# Patient Record
Sex: FEMALE | Race: ASIAN | NOT HISPANIC OR LATINO | URBAN - METROPOLITAN AREA
[De-identification: names, ages, dates, MRNs, and addresses within clinical notes are randomized per-mention and may not be internally consistent; named-entity substitution may affect disease eponyms.]

---

## 2017-02-05 ENCOUNTER — EMERGENCY (EMERGENCY)
Facility: HOSPITAL | Age: 57
LOS: 1 days | Discharge: HOME | End: 2017-02-05
Attending: EMERGENCY MEDICINE
Payer: COMMERCIAL

## 2017-02-05 VITALS
DIASTOLIC BLOOD PRESSURE: 79 MMHG | HEART RATE: 84 BPM | WEIGHT: 162.04 LBS | RESPIRATION RATE: 17 BRPM | SYSTOLIC BLOOD PRESSURE: 168 MMHG | TEMPERATURE: 98 F | OXYGEN SATURATION: 97 % | HEIGHT: 65 IN

## 2017-02-05 DIAGNOSIS — H26.9 UNSPECIFIED CATARACT: Chronic | ICD-10-CM

## 2017-02-05 DIAGNOSIS — I10 ESSENTIAL (PRIMARY) HYPERTENSION: ICD-10-CM

## 2017-02-05 DIAGNOSIS — R51 HEADACHE: ICD-10-CM

## 2017-02-05 DIAGNOSIS — Z90.49 ACQUIRED ABSENCE OF OTHER SPECIFIED PARTS OF DIGESTIVE TRACT: Chronic | ICD-10-CM

## 2017-02-05 DIAGNOSIS — Z98.42 CATARACT EXTRACTION STATUS, LEFT EYE: ICD-10-CM

## 2017-02-05 DIAGNOSIS — Z90.49 ACQUIRED ABSENCE OF OTHER SPECIFIED PARTS OF DIGESTIVE TRACT: ICD-10-CM

## 2017-02-05 DIAGNOSIS — Z96.1 PRESENCE OF INTRAOCULAR LENS: ICD-10-CM

## 2017-02-05 DIAGNOSIS — Z98.41 CATARACT EXTRACTION STATUS, RIGHT EYE: ICD-10-CM

## 2017-02-05 DIAGNOSIS — E11.9 TYPE 2 DIABETES MELLITUS WITHOUT COMPLICATIONS: ICD-10-CM

## 2017-02-05 DIAGNOSIS — Z79.4 LONG TERM (CURRENT) USE OF INSULIN: ICD-10-CM

## 2017-02-05 PROCEDURE — 70450 CT HEAD/BRAIN W/O DYE: CPT

## 2017-02-05 PROCEDURE — 99284 EMERGENCY DEPT VISIT MOD MDM: CPT | Mod: 25

## 2017-02-05 PROCEDURE — 70450 CT HEAD/BRAIN W/O DYE: CPT | Mod: 26

## 2017-02-05 PROCEDURE — 99285 EMERGENCY DEPT VISIT HI MDM: CPT | Mod: 25

## 2017-02-05 NOTE — ED PROVIDER NOTE - CHPI ED SYMPTOMS NEG
no vomiting/no chest pain, no headache, no visual changes/no nausea/no shortness of breath/no cough/no diaphoresis/no dizziness/no chills/no fever

## 2017-02-05 NOTE — ED PROVIDER NOTE - PHYSICAL EXAMINATION
GEN: WD, WN, NAD. Non-toxic appearance  HEENT: NC, AT, MMM, External ears normal, nares patent no drainage,  PERRLA, EOMI, conjunctiva clear, no scleral icterus  NECK: FROM, No meningismus, Trachea midline  CV: Regular rate, regular rhythm. Normal S1/2. No M/R/G  PULM: Normal respiratory effort. CTA-B. No C/W/R  ABD: NABS, soft, ND, NT, no pulsatile masses  SKIN: Warm/dry, no rash  BACK: No midline vertebral tenderness, no CVA tenderness  MSK/EXT: FROM x4. distal pulses intact and palpable, No tenderness/deformities/effusions, No edema/cyanosis  NEURO: A&O, CN intact, HELMS, equal and appropriate strength, SILTx4, no focal deficits noted  PSYCH: Appropriate Affect, judgment, mood, and insight

## 2017-02-05 NOTE — ED PROVIDER NOTE - NS ED MD SCRIBE ATTENDING SCRIBE SECTIONS
PHYSICAL EXAM/DISPOSITION/HIV/HISTORY OF PRESENT ILLNESS/PAST MEDICAL/SURGICAL/SOCIAL HISTORY/VITAL SIGNS( Pullset)/REVIEW OF SYSTEMS

## 2017-02-05 NOTE — ED ADULT NURSE NOTE - OBJECTIVE STATEMENT
Patient complain of head sensation and worried about stroke. Also complain of elevated blood pressure. No neurological deficits noted.

## 2017-02-05 NOTE — ED PROVIDER NOTE - CONDUCTED A DETAILED DISCUSSION WITH PATIENT AND/OR GUARDIAN REGARDING, MDM
need for outpatient follow-up radiology results/need for outpatient follow-up/return to ED if symptoms worsen, persist or questions arise

## 2017-02-05 NOTE — ED PROVIDER NOTE - OBJECTIVE STATEMENT
55 y/o F pt w/ PMHx of HTN and DM and PSHx of cholecystectomy presents to the ED c/o high BP today. Pt reports that she found her systolic BP to be 189  at home, w/ a sensation of head heaviness. Pt states that the heaviness was  self-limited and resolved on it own. Pt states that she feels at baseline currently in the ED. Pt denies fever, chills, chest pain, SOB, cough, abdominal pain, nausea, vomiting, headache, visual changes, dizziness, diaphoresis, or any other complaints. NKDA

## 2017-02-05 NOTE — ED PROVIDER NOTE - CARE PLAN
Principal Discharge DX:	Essential hypertension  Secondary Diagnosis:	Acute nonintractable headache, unspecified headache type

## 2020-08-18 NOTE — ED PROVIDER NOTE - MEDICAL DECISION MAKING DETAILS
57 y/o F pt p/w hgih BP, head heaviness sensation. Will obtain CT Head to r/o intercranial pathology. If normal will D/C home. Pt has MRI of Head and Neck scheduled for tomorrow morning. none 57 y/o F pt p/w hgih BP, head heaviness sensation. Will obtain CT Head to r/o intercranial pathology. If normal will D/C home. Pt has MRI of Head and Neck scheduled for tomorrow morning. pt neuro negative x2 CT without significant acute findings. will d/c home. pt with MRI scheduled tomorrow. Pt is well appearing, stable for discharge and follow up without fail with medical doctor. I had a detailed discussion with the patient and/or guardian regarding the historical points, exam findings, and any diagnostic results supporting the discharge diagnosis. Pt educated on care and need for follow up. Strict return instructions and red flag signs and symptoms discussed with patient. Medications for discharge discussed and adverse effects reviewed. Questions answered. Pt shows understanding of discharge information and agrees to follow.

## 2024-12-12 ENCOUNTER — INPATIENT (INPATIENT)
Facility: HOSPITAL | Age: 64
LOS: 7 days | Discharge: HOME | DRG: 603 | End: 2024-12-20
Attending: STUDENT IN AN ORGANIZED HEALTH CARE EDUCATION/TRAINING PROGRAM | Admitting: STUDENT IN AN ORGANIZED HEALTH CARE EDUCATION/TRAINING PROGRAM
Payer: COMMERCIAL

## 2024-12-12 VITALS
RESPIRATION RATE: 16 BRPM | OXYGEN SATURATION: 99 % | TEMPERATURE: 98 F | WEIGHT: 156.97 LBS | SYSTOLIC BLOOD PRESSURE: 139 MMHG | DIASTOLIC BLOOD PRESSURE: 61 MMHG | HEART RATE: 73 BPM | HEIGHT: 65 IN

## 2024-12-12 DIAGNOSIS — L03.90 CELLULITIS, UNSPECIFIED: ICD-10-CM

## 2024-12-12 DIAGNOSIS — Z90.49 ACQUIRED ABSENCE OF OTHER SPECIFIED PARTS OF DIGESTIVE TRACT: Chronic | ICD-10-CM

## 2024-12-12 DIAGNOSIS — E11.621 TYPE 2 DIABETES MELLITUS WITH FOOT ULCER: ICD-10-CM

## 2024-12-12 DIAGNOSIS — E11.9 TYPE 2 DIABETES MELLITUS WITHOUT COMPLICATIONS: ICD-10-CM

## 2024-12-12 DIAGNOSIS — Z29.9 ENCOUNTER FOR PROPHYLACTIC MEASURES, UNSPECIFIED: ICD-10-CM

## 2024-12-12 DIAGNOSIS — H26.9 UNSPECIFIED CATARACT: Chronic | ICD-10-CM

## 2024-12-12 LAB
ALBUMIN SERPL ELPH-MCNC: 3.4 G/DL — LOW (ref 3.5–5)
ALP SERPL-CCNC: 114 U/L — SIGNIFICANT CHANGE UP (ref 40–120)
ALT FLD-CCNC: 22 U/L DA — SIGNIFICANT CHANGE UP (ref 10–60)
ANION GAP SERPL CALC-SCNC: 4 MMOL/L — LOW (ref 5–17)
APPEARANCE UR: CLEAR — SIGNIFICANT CHANGE UP
AST SERPL-CCNC: 16 U/L — SIGNIFICANT CHANGE UP (ref 10–40)
BASOPHILS # BLD AUTO: 0.05 K/UL — SIGNIFICANT CHANGE UP (ref 0–0.2)
BASOPHILS NFR BLD AUTO: 0.4 % — SIGNIFICANT CHANGE UP (ref 0–2)
BILIRUB SERPL-MCNC: 0.5 MG/DL — SIGNIFICANT CHANGE UP (ref 0.2–1.2)
BILIRUB UR-MCNC: NEGATIVE — SIGNIFICANT CHANGE UP
BUN SERPL-MCNC: 24 MG/DL — HIGH (ref 7–18)
CALCIUM SERPL-MCNC: 9 MG/DL — SIGNIFICANT CHANGE UP (ref 8.4–10.5)
CHLORIDE SERPL-SCNC: 96 MMOL/L — SIGNIFICANT CHANGE UP (ref 96–108)
CO2 SERPL-SCNC: 28 MMOL/L — SIGNIFICANT CHANGE UP (ref 22–31)
COLOR SPEC: YELLOW — SIGNIFICANT CHANGE UP
CREAT SERPL-MCNC: 1.12 MG/DL — SIGNIFICANT CHANGE UP (ref 0.5–1.3)
CRP SERPL-MCNC: 121 MG/L — HIGH (ref 0–5)
DIFF PNL FLD: NEGATIVE — SIGNIFICANT CHANGE UP
EGFR: 55 ML/MIN/1.73M2 — LOW
EOSINOPHIL # BLD AUTO: 0.17 K/UL — SIGNIFICANT CHANGE UP (ref 0–0.5)
EOSINOPHIL NFR BLD AUTO: 1.3 % — SIGNIFICANT CHANGE UP (ref 0–6)
ERYTHROCYTE [SEDIMENTATION RATE] IN BLOOD: 67 MM/HR — HIGH (ref 0–20)
ERYTHROCYTE [SEDIMENTATION RATE] IN BLOOD: 67 MM/HR — HIGH (ref 0–20)
GLUCOSE BLDC GLUCOMTR-MCNC: 363 MG/DL — HIGH (ref 70–99)
GLUCOSE SERPL-MCNC: 469 MG/DL — CRITICAL HIGH (ref 70–99)
GLUCOSE UR QL: >=1000 MG/DL
HCT VFR BLD CALC: 36.9 % — SIGNIFICANT CHANGE UP (ref 34.5–45)
HGB BLD-MCNC: 13.3 G/DL — SIGNIFICANT CHANGE UP (ref 11.5–15.5)
IMM GRANULOCYTES NFR BLD AUTO: 0.6 % — SIGNIFICANT CHANGE UP (ref 0–0.9)
KETONES UR-MCNC: 15 MG/DL
LACTATE SERPL-SCNC: 1.6 MMOL/L — SIGNIFICANT CHANGE UP (ref 0.7–2)
LEUKOCYTE ESTERASE UR-ACNC: NEGATIVE — SIGNIFICANT CHANGE UP
LYMPHOCYTES # BLD AUTO: 26.7 % — SIGNIFICANT CHANGE UP (ref 13–44)
LYMPHOCYTES # BLD AUTO: 3.38 K/UL — HIGH (ref 1–3.3)
MCHC RBC-ENTMCNC: 26.4 PG — LOW (ref 27–34)
MCHC RBC-ENTMCNC: 36 G/DL — SIGNIFICANT CHANGE UP (ref 32–36)
MCV RBC AUTO: 73.2 FL — LOW (ref 80–100)
MONOCYTES # BLD AUTO: 0.63 K/UL — SIGNIFICANT CHANGE UP (ref 0–0.9)
MONOCYTES NFR BLD AUTO: 5 % — SIGNIFICANT CHANGE UP (ref 2–14)
NEUTROPHILS # BLD AUTO: 8.36 K/UL — HIGH (ref 1.8–7.4)
NEUTROPHILS NFR BLD AUTO: 66 % — SIGNIFICANT CHANGE UP (ref 43–77)
NITRITE UR-MCNC: NEGATIVE — SIGNIFICANT CHANGE UP
NRBC # BLD: 0 /100 WBCS — SIGNIFICANT CHANGE UP (ref 0–0)
PH UR: 5 — SIGNIFICANT CHANGE UP (ref 5–8)
PLATELET # BLD AUTO: 268 K/UL — SIGNIFICANT CHANGE UP (ref 150–400)
POTASSIUM SERPL-MCNC: 4.6 MMOL/L — SIGNIFICANT CHANGE UP (ref 3.5–5.3)
POTASSIUM SERPL-SCNC: 4.6 MMOL/L — SIGNIFICANT CHANGE UP (ref 3.5–5.3)
PROT SERPL-MCNC: 8.3 G/DL — SIGNIFICANT CHANGE UP (ref 6–8.3)
PROT UR-MCNC: NEGATIVE MG/DL — SIGNIFICANT CHANGE UP
RBC # BLD: 5.04 M/UL — SIGNIFICANT CHANGE UP (ref 3.8–5.2)
RBC # FLD: 14 % — SIGNIFICANT CHANGE UP (ref 10.3–14.5)
SODIUM SERPL-SCNC: 128 MMOL/L — LOW (ref 135–145)
SP GR SPEC: 1.03 — HIGH (ref 1–1.03)
TROPONIN I, HIGH SENSITIVITY RESULT: 18.8 NG/L — SIGNIFICANT CHANGE UP
UROBILINOGEN FLD QL: 0.2 MG/DL — SIGNIFICANT CHANGE UP (ref 0.2–1)
WBC # BLD: 12.67 K/UL — HIGH (ref 3.8–10.5)
WBC # FLD AUTO: 12.67 K/UL — HIGH (ref 3.8–10.5)

## 2024-12-12 PROCEDURE — 99223 1ST HOSP IP/OBS HIGH 75: CPT | Mod: GC

## 2024-12-12 PROCEDURE — 71046 X-RAY EXAM CHEST 2 VIEWS: CPT | Mod: 26

## 2024-12-12 PROCEDURE — 99285 EMERGENCY DEPT VISIT HI MDM: CPT

## 2024-12-12 PROCEDURE — 73620 X-RAY EXAM OF FOOT: CPT | Mod: 26,LT

## 2024-12-12 PROCEDURE — 93010 ELECTROCARDIOGRAM REPORT: CPT

## 2024-12-12 RX ORDER — PIPERACILLIN SODIUM AND TAZOBACTAM SODIUM 4; .5 G/20ML; G/20ML
3.38 INJECTION, POWDER, LYOPHILIZED, FOR SOLUTION INTRAVENOUS ONCE
Refills: 0 | Status: COMPLETED | OUTPATIENT
Start: 2024-12-12 | End: 2024-12-13

## 2024-12-12 RX ORDER — VANCOMYCIN HCL 900 MCG/MG
1000 POWDER (GRAM) MISCELLANEOUS ONCE
Refills: 0 | Status: COMPLETED | OUTPATIENT
Start: 2024-12-12 | End: 2024-12-12

## 2024-12-12 RX ORDER — ACETAMINOPHEN 500MG 500 MG/1
650 TABLET, COATED ORAL EVERY 6 HOURS
Refills: 0 | Status: DISCONTINUED | OUTPATIENT
Start: 2024-12-12 | End: 2024-12-20

## 2024-12-12 RX ORDER — VANCOMYCIN HCL 900 MCG/MG
1000 POWDER (GRAM) MISCELLANEOUS EVERY 12 HOURS
Refills: 0 | Status: DISCONTINUED | OUTPATIENT
Start: 2024-12-13 | End: 2024-12-19

## 2024-12-12 RX ORDER — LORATADINE 10 MG/1
1 TABLET ORAL
Qty: 14 | Refills: 0
Start: 2024-12-12 | End: 2024-12-25

## 2024-12-12 RX ORDER — VANCOMYCIN HCL 900 MCG/MG
POWDER (GRAM) MISCELLANEOUS
Refills: 0 | Status: DISCONTINUED | OUTPATIENT
Start: 2024-12-12 | End: 2024-12-19

## 2024-12-12 RX ORDER — PIPERACILLIN SODIUM AND TAZOBACTAM SODIUM 4; .5 G/20ML; G/20ML
3.38 INJECTION, POWDER, LYOPHILIZED, FOR SOLUTION INTRAVENOUS ONCE
Refills: 0 | Status: COMPLETED | OUTPATIENT
Start: 2024-12-12 | End: 2024-12-12

## 2024-12-12 RX ORDER — ENOXAPARIN SODIUM 30 MG/.3ML
40 INJECTION SUBCUTANEOUS EVERY 24 HOURS
Refills: 0 | Status: DISCONTINUED | OUTPATIENT
Start: 2024-12-12 | End: 2024-12-20

## 2024-12-12 RX ORDER — AMOXICILLIN/POTASSIUM CLAV 250-125 MG
500 TABLET ORAL
Qty: 20 | Refills: 0
Start: 2024-12-12 | End: 2024-12-21

## 2024-12-12 RX ORDER — INSULIN GLARGINE 100 [IU]/ML
14 INJECTION, SOLUTION SUBCUTANEOUS AT BEDTIME
Refills: 0 | Status: DISCONTINUED | OUTPATIENT
Start: 2024-12-12 | End: 2024-12-13

## 2024-12-12 RX ORDER — SODIUM CHLORIDE 9 MG/ML
1000 INJECTION, SOLUTION INTRAMUSCULAR; INTRAVENOUS; SUBCUTANEOUS ONCE
Refills: 0 | Status: COMPLETED | OUTPATIENT
Start: 2024-12-12 | End: 2024-12-12

## 2024-12-12 RX ORDER — PIPERACILLIN SODIUM AND TAZOBACTAM SODIUM 4; .5 G/20ML; G/20ML
3.38 INJECTION, POWDER, LYOPHILIZED, FOR SOLUTION INTRAVENOUS EVERY 8 HOURS
Refills: 0 | Status: DISCONTINUED | OUTPATIENT
Start: 2024-12-13 | End: 2024-12-16

## 2024-12-12 RX ADMIN — Medication 250 MILLIGRAM(S): at 21:39

## 2024-12-12 RX ADMIN — SODIUM CHLORIDE 1000 MILLILITER(S): 9 INJECTION, SOLUTION INTRAMUSCULAR; INTRAVENOUS; SUBCUTANEOUS at 15:47

## 2024-12-12 RX ADMIN — PIPERACILLIN SODIUM AND TAZOBACTAM SODIUM 200 GRAM(S): 4; .5 INJECTION, POWDER, LYOPHILIZED, FOR SOLUTION INTRAVENOUS at 21:00

## 2024-12-12 RX ADMIN — SODIUM CHLORIDE 1000 MILLILITER(S): 9 INJECTION, SOLUTION INTRAMUSCULAR; INTRAVENOUS; SUBCUTANEOUS at 21:24

## 2024-12-12 NOTE — CONSULT NOTE ADULT - SUBJECTIVE AND OBJECTIVE BOX
Podiatry HPI: Podiatry consulted for 64-year-old female with chief complaint of left foot diabetic infection. Patient sent in by her podiatrist, Dr. Delarosa. States she saw Dr. Delarosa last week who noticed an ulceration on her left hallux. Patient states she was prescribed Doxycycline 100 mg BID and was instructed to apply Mupirocin ointment to the wound. She comments that she has taken 2 days worth of the Doxycycline.  Patient states the wound has worsened in appearance over the last week and was instructed by her podiatrist to come in to the ED. She also complains of longstanding redness and swelling to the left foot for over a month now. Patient complains of subjective fever and chills. Denies n/v.     PMH:DM (diabetes mellitus)    HTN (hypertension)    Cataract of both eyes, unspecified cataract type      Allergies: No Known Allergies    Medications: Per patient, not on any medications    PSX: No significant past surgical history    Cataract of both eyes, unspecified cataract type    History of cholecystectomy      Vital Signs Last 24 Hrs  T(C): 36.9 (12 Dec 2024 14:08), Max: 36.9 (12 Dec 2024 14:08)  T(F): 98.5 (12 Dec 2024 14:08), Max: 98.5 (12 Dec 2024 14:08)  HR: 73 (12 Dec 2024 14:08) (73 - 73)  BP: 139/61 (12 Dec 2024 14:08) (139/61 - 139/61)  BP(mean): --  RR: 16 (12 Dec 2024 14:08) (16 - 16)  SpO2: 99% (12 Dec 2024 14:08) (99% - 99%)    Parameters below as of 12 Dec 2024 14:08  Patient On (Oxygen Delivery Method): room air        LABS                        13.3   12.67 )-----------( 268      ( 12 Dec 2024 15:35 )             36.9               12-12    128[L]  |  96  |  24[H]  ----------------------------<  469[HH]  4.6   |  28  |  1.12    Ca    9.0      12 Dec 2024 15:35    TPro  8.3  /  Alb  3.4[L]  /  TBili  0.5  /  DBili  x   /  AST  16  /  ALT  22  /  AlkPhos  114  12-12      ROS  REVIEW OF SYSTEMS:    CONSTITUTIONAL: No fever, weight loss, or fatigue  EYES: No eye pain, visual disturbances, or discharge  ENMT:  No difficulty hearing, tinnitus, vertigo; No sinus or throat pain  NECK: No pain or stiffness  BREASTS: No pain, masses, or nipple discharge  RESPIRATORY: No cough, wheezing, chills or hemoptysis; No shortness of breath  CARDIOVASCULAR: No chest pain, palpitations, dizziness, or leg swelling  GASTROINTESTINAL: No abdominal or epigastric pain. No nausea, vomiting, or hematemesis; No diarrhea or constipation. No melena or hematochezia.  GENITOURINARY: No dysuria, frequency, hematuria, or incontinence  NEUROLOGICAL: No headaches, memory loss, loss of strength, numbness, or tremors  SKIN: No itching, burning, rashes, or lesions   LYMPH NODES: No enlarged glands  ENDOCRINE: No heat or cold intolerance; No hair loss  MUSCULOSKELETAL: No joint pain or swelling; No muscle, back, or extremity pain  PSYCHIATRIC: No depression, anxiety, mood swings, or difficulty sleeping  HEME/LYMPH: No easy bruising, or bleeding gums  ALLERY AND IMMUNOLOGIC: No hives or eczema      Physical Exam:  Vasc: DP pulses palpable 1/4 b/l. TG warm to warm with mild increase in temperature to left hallux around wound. Pedal hair present. Diffuse erythema and 1+ pitting edema noted to entire left foot and leg.   Derm: Area of necrosis noted to distal tip of left hallux. Small wound measuring 0.5 x 1.0 cm with fibrograunlar wound base. Serous drainage noted. Wound edges are macerated. Tunneling noted medially about 0.7 cm. No Fluctuance/crepitus/streaking noted. Malodor noted.  Neuro: Gross sensation diminshed  Msk: Deferred     IMAGING:  Xray Left foot: Pending final read  Evidence of charcot changes   No signs of soft tissue emphysema

## 2024-12-12 NOTE — ED PROVIDER NOTE - NEURO NEGATIVE STATEMENT, MLM
Abdomen , soft, nontender, nondistended , no guarding or rigidity , no masses palpable , normal bowel sounds , Liver and Spleen , no hepatomegaly present , no hepatosplenomegaly , liver nontender , spleen not palpable no loss of consciousness, no gait abnormality, no headache, no sensory deficits, and no weakness.

## 2024-12-12 NOTE — ED PROVIDER NOTE - CARE PLAN
Principal Discharge DX:	Cellulitis  Secondary Diagnosis:	At risk for diabetic foot ulcer  Secondary Diagnosis:	Osteomyelitis of ankle or foot, acute   1

## 2024-12-12 NOTE — H&P ADULT - ASSESSMENT
Patient is a 64 year old female from home ambulates independently, with PMH of  T2DM (on insulin pump) who presented to the ED with a L hallux x 3-4d. Patient states she went to her podiatrist a few days ago and he noticed the wound when he clipped her nail and prescribed her doxycycline 100mg BID, of which she's taken 2 full days. Patient is being admitted to r/o acute osteomyelitis of L hallux. Pending MRI and L Foot XR.

## 2024-12-12 NOTE — H&P ADULT - NSICDXPASTMEDICALHX_GEN_ALL_CORE_FT
PAST MEDICAL HISTORY:  Cataract of both eyes, unspecified cataract type     DM (diabetes mellitus)

## 2024-12-12 NOTE — ED ADULT NURSE NOTE - CHIEF COMPLAINT QUOTE
walked  in with  c/o  left  big  toe  infection on po  antibiotics  but  not  working  pt  has  hx  of  DM None

## 2024-12-12 NOTE — CONSULT NOTE ADULT - ASSESSMENT
A: Left Hallux diabetic ulceration  Charcot neuroarthropathy, left foot     P:  Patient evaluated and Chart reviewed  Vitals stable, leukocytosis present  Xrays evaluated - pending final read. Evidence of Charcot changes noted to left midfoot. No signs of soft tissue emphysema   Recommend MRI of Left foot to evaluate for osteomyelitis of the hallux   Patient states last A1C was 10% - stressed the importance of proper glycemic control, daily foot examinations, and wearing proper shoe gear   Discussed diagnosis with patient and explained that she is to be NWB to the left foot to prevent worsening of Charcot deformity  Patient states understanding. Will get PT consult when on the floors so patient can be trained to be NWB with crutches or walker   Continue antibiotics per ID/IM  Left foot was dressed with Betadine/DSD   Podiatry to follow while in house  Discussed with Attending Dr. Palma

## 2024-12-12 NOTE — H&P ADULT - NSICDXPASTSURGICALHX_GEN_ALL_CORE_FT
PAST SURGICAL HISTORY:  Cataract of both eyes, unspecified cataract type     History of cholecystectomy

## 2024-12-12 NOTE — ED ADULT TRIAGE NOTE - CHIEF COMPLAINT QUOTE
walked  in with  c/o  left  big  toe  infection on po  antibiotics  but  not  working  pt  has  hx  of  DM

## 2024-12-12 NOTE — H&P ADULT - NSHPREVIEWOFSYSTEMS_GEN_ALL_CORE
CONSTITUTIONAL: No fever, weight loss, or fatigue  RESPIRATORY: No cough, wheezing, +chills or hemoptysis; No shortness of breath  CARDIOVASCULAR: No chest pain, palpitations, dizziness, or +leg swelling  GASTROINTESTINAL: No abdominal pain. No nausea, vomiting, or hematemesis; No diarrhea or constipation. No melena or hematochezia.  GENITOURINARY: No dysuria or hematuria, urinary frequency  NEUROLOGICAL: No headaches, memory loss, loss of strength, numbness, or tremors  ENDOCRINE: No polyuria, polydipsia, or heat/cold intolerance  MUSKULOSKELETAL: No muscle aches, joint pains  HEME: no easy bruisability, no tender or enlarged lymph nodes  SKIN: No itching, burning, rashes, or lesions .

## 2024-12-12 NOTE — PATIENT PROFILE ADULT - FALL HARM RISK - HARM RISK INTERVENTIONS

## 2024-12-12 NOTE — H&P ADULT - PROBLEM SELECTOR PLAN 2
on insulin lispro pump at home  start 14 lantus, 5 TID based on 0.4mg/kg  c/w ISS  POCTs ACHS  f/u a1c  consult Endo in the AM on insulin lispro pump at home  UA Glucosuria, CMP glc of 469, last known a1c 10%  start 14 lantus, 5 TID based on 0.4mg/kg  c/w ISS  POCTs ACHS  f/u a1c  consult Endo in the AM

## 2024-12-12 NOTE — ED PROVIDER NOTE - CPE EDP SKIN NORM
Patient is stable in room on cart and continues to void at the bedside using the urinal.  Patient is aware of a fluid restriction and low sodium diet.  ICU MD in to see and evaluate. Pt will move to ICU   normal...

## 2024-12-12 NOTE — H&P ADULT - ATTENDING COMMENTS
65yo F with pmh of uncontrolled IDDM who presents for worsening foot wound concerning for infection on L foot. She notes this started about a week ago and saw her podiatrist for this about 2 days ago. He gave her mupirocin for topical use and po doxycycline 100bid to take for 10 days. She had completed 2 days only and went back to podiatry this morning and they recommended she come to the ED for evaluation. She notes she does not have sensation in her feet due to her diabetes. She notes the pain is around 6/10, the pain involves the great toe but also the dorsum of foot and ankle. She noted pain and tenderness in the area. She denies fevers, but admits to chills, malaise. No chest pain, dyspnea, nausea, vomiting or abdominal pain. She notes prior shx of cataracts and gallbladder surgery, all of which went uneventfully.    She notes a few months back she saw her endocrinologist who told her that her a1c was around 10. She does not take any lantus, only lispro which she puts into a pump and uses that based upon her meals and a sliding scale. She did not take any lispro with her lunch around noon.      Vitals, labs, imaging reviewed. Afebrile, 139/61, HR 73, 98.5F. Wbc 12, Na 128 however gluc 469, Corrected Na 134, , lact 1.6   PE – nontoxic appearing female, NAD, NC/AT, RRR, lungs CTA b/l, abd soft ntnd, RLE wnl, LLE with great toe 2cm cut with small area of darkened blood under the skin at tip of toe, appearing ecchymotic, L foot dorsum and ankle with warmth, erythema, mild edema        A/P   #Left Foot great toe ulceration with overlying cellulitis   #R/o OM   #Uncontrolled DM with hyperglycemia   #Charcot foot     -trend inflammatory markers, trend cbc   -can start vancomycin, zosyn for now   -ID consult    -f/u bcx   -podiatry consulted, f/u rec   -MRI L foot   -per podiatry, NWB to L foot   -f/u XR L foot read   -med rec   -check a1c, endo consult   -monitor FS/ISS, lantus can start at 14u for tonight (she is not on lantus at home), lispro 5u tid (takes sliding scale at home)   -pain control prn   -dvt ppx

## 2024-12-12 NOTE — H&P ADULT - NSHPPHYSICALEXAM_GEN_ALL_CORE
GENERAL: NAD; lying in bed  HEAD:  Atraumatic, Normocephalic  EYES: EOMI, PERRLA, conjunctiva and sclera clear  ENMT: No tonsillar erythema, exudates, or enlargement; Moist mucous membranes  NECK: Supple, normal appearance, No JVD; Normal thyroid; Trachea midline  NERVOUS SYSTEM:  Alert & Oriented X3,  Motor Strength 5/5 B/L upper and lower extremities, sensation intact  CHEST/LUNG: Lungs clear to auscultation bilaterally, No rales, rhonchi, wheezing   HEART: Regular rate and rhythm; No murmurs, rubs, or gallops  ABDOMEN: Soft, Nontender, Nondistended; Bowel sounds present  EXTREMITIES:  2+ Peripheral Pulses, 1+ pitting edema B/L, LLE erythematous, TTP, warmer when compared to RLE  SKIN: No rashes or lesions;

## 2024-12-12 NOTE — ED PROVIDER NOTE - PSYCHIATRIC NEGATIVE STATEMENT, MLM
Pt is here for fasting labs(nurse visit). No orders entered. Pt has appt 4/12 with you, please advise what orders you want in. no known mental health issues.

## 2024-12-12 NOTE — H&P ADULT - HISTORY OF PRESENT ILLNESS
Patient is a 64 year old female from home ambulates independently, with PMH of  T2DM (on insulin pump) who presented to the ED with a L hallux x 3-4d. Patient states she went to her podiatrist a few days ago and he noticed the wound when he clipped her nail and prescribed her doxycycline 100mg BID, of which she's taken 2 full days. She also began applying mupirocin. Patient also endorses chills, body aches that began 2-3d ago and states the LLE edema and erythema also began around the same time. Patient states she previously had a R foot injury many years ago.     Patient denies nausea, vomiting, headache, blurry vision, dizziness, chest pain, abdominal pain, constipation, diarrhea, leg swelling.

## 2024-12-12 NOTE — PATIENT PROFILE ADULT - FALL HARM RISK - PATIENT NEEDS ASSISTANCE
02/22/2023   [facility] - Outpatient Clinics  N/A  For questions about this resource list or additional care needs, please contact your primary care clinic or care manager.  Phone: [phone]   Email: N/A   Address: [address] [place] [id number]   Hours: N/A        Exercise and Recreation       Gym or workout facility  1  [employer] [place] Distance: 9.13 miles      In-Person   [address] [place] [address]  Language: English  Hours: Mon - Sun Open 24 Hours  Fees: Self Pay   Phone: [phone] Email: [email or web address] Website: [email or web address]     2  [employer] [facility] Distance: 16.39 miles      In-Person   [address] [place] [address]  Language: English  Hours: Mon - Sun Open 24 Hours  Fees: Self Pay   Phone: [phone] Email: [email or web address] Website: [email or web address]          Important Numbers & Websites       Emergency Services   911  [facility]   311  Poison Control   (547) 743-6804  Suicide Prevention Lifeline   (271) 392-7552 (TALK)  Child Abuse Hotline   (757) 277-5639 (4-A-Child)  Sexual Assault Hotline   (501) 238-2092 (HOPE)  National Runaway Safeline   (751) 130-9391 (RUNAWAY)  All-Options Talkline   (853) 204-5644  Substance Abuse Referral   (330) 366-9137 (HELP)     No assistance needed

## 2024-12-13 DIAGNOSIS — Z75.8 OTHER PROBLEMS RELATED TO MEDICAL FACILITIES AND OTHER HEALTH CARE: ICD-10-CM

## 2024-12-13 DIAGNOSIS — E87.1 HYPO-OSMOLALITY AND HYPONATREMIA: ICD-10-CM

## 2024-12-13 DIAGNOSIS — M86.10 OTHER ACUTE OSTEOMYELITIS, UNSPECIFIED SITE: ICD-10-CM

## 2024-12-13 LAB
ANION GAP SERPL CALC-SCNC: 4 MMOL/L — LOW (ref 5–17)
BUN SERPL-MCNC: 16 MG/DL — SIGNIFICANT CHANGE UP (ref 7–18)
CALCIUM SERPL-MCNC: 8.2 MG/DL — LOW (ref 8.4–10.5)
CHLORIDE SERPL-SCNC: 105 MMOL/L — SIGNIFICANT CHANGE UP (ref 96–108)
CO2 SERPL-SCNC: 27 MMOL/L — SIGNIFICANT CHANGE UP (ref 22–31)
CREAT SERPL-MCNC: 0.76 MG/DL — SIGNIFICANT CHANGE UP (ref 0.5–1.3)
EGFR: 88 ML/MIN/1.73M2 — SIGNIFICANT CHANGE UP
GLUCOSE BLDC GLUCOMTR-MCNC: 240 MG/DL — HIGH (ref 70–99)
GLUCOSE BLDC GLUCOMTR-MCNC: 254 MG/DL — HIGH (ref 70–99)
GLUCOSE BLDC GLUCOMTR-MCNC: 296 MG/DL — HIGH (ref 70–99)
GLUCOSE BLDC GLUCOMTR-MCNC: 380 MG/DL — HIGH (ref 70–99)
GLUCOSE SERPL-MCNC: 290 MG/DL — HIGH (ref 70–99)
HCT VFR BLD CALC: 31.9 % — LOW (ref 34.5–45)
HGB BLD-MCNC: 11.4 G/DL — LOW (ref 11.5–15.5)
MAGNESIUM SERPL-MCNC: 2.1 MG/DL — SIGNIFICANT CHANGE UP (ref 1.6–2.6)
MCHC RBC-ENTMCNC: 26.5 PG — LOW (ref 27–34)
MCHC RBC-ENTMCNC: 35.7 G/DL — SIGNIFICANT CHANGE UP (ref 32–36)
MCV RBC AUTO: 74.2 FL — LOW (ref 80–100)
MRSA PCR RESULT.: SIGNIFICANT CHANGE UP
NRBC # BLD: 0 /100 WBCS — SIGNIFICANT CHANGE UP (ref 0–0)
PHOSPHATE SERPL-MCNC: 2.9 MG/DL — SIGNIFICANT CHANGE UP (ref 2.5–4.5)
PLATELET # BLD AUTO: 221 K/UL — SIGNIFICANT CHANGE UP (ref 150–400)
POTASSIUM SERPL-MCNC: 3.6 MMOL/L — SIGNIFICANT CHANGE UP (ref 3.5–5.3)
POTASSIUM SERPL-SCNC: 3.6 MMOL/L — SIGNIFICANT CHANGE UP (ref 3.5–5.3)
RBC # BLD: 4.3 M/UL — SIGNIFICANT CHANGE UP (ref 3.8–5.2)
RBC # FLD: 13.7 % — SIGNIFICANT CHANGE UP (ref 10.3–14.5)
S AUREUS DNA NOSE QL NAA+PROBE: SIGNIFICANT CHANGE UP
SODIUM SERPL-SCNC: 136 MMOL/L — SIGNIFICANT CHANGE UP (ref 135–145)
WBC # BLD: 9.12 K/UL — SIGNIFICANT CHANGE UP (ref 3.8–10.5)
WBC # FLD AUTO: 9.12 K/UL — SIGNIFICANT CHANGE UP (ref 3.8–10.5)

## 2024-12-13 PROCEDURE — 99233 SBSQ HOSP IP/OBS HIGH 50: CPT

## 2024-12-13 PROCEDURE — 73718 MRI LOWER EXTREMITY W/O DYE: CPT | Mod: 26,LT

## 2024-12-13 RX ORDER — INSULIN GLARGINE 100 [IU]/ML
25 INJECTION, SOLUTION SUBCUTANEOUS AT BEDTIME
Refills: 0 | Status: DISCONTINUED | OUTPATIENT
Start: 2024-12-13 | End: 2024-12-14

## 2024-12-13 RX ADMIN — Medication 5 UNIT(S): at 08:01

## 2024-12-13 RX ADMIN — Medication 5: at 12:02

## 2024-12-13 RX ADMIN — PIPERACILLIN SODIUM AND TAZOBACTAM SODIUM 25 GRAM(S): 4; .5 INJECTION, POWDER, LYOPHILIZED, FOR SOLUTION INTRAVENOUS at 14:11

## 2024-12-13 RX ADMIN — PIPERACILLIN SODIUM AND TAZOBACTAM SODIUM 25 GRAM(S): 4; .5 INJECTION, POWDER, LYOPHILIZED, FOR SOLUTION INTRAVENOUS at 22:13

## 2024-12-13 RX ADMIN — INSULIN GLARGINE 25 UNIT(S): 100 INJECTION, SOLUTION SUBCUTANEOUS at 22:13

## 2024-12-13 RX ADMIN — Medication 250 MILLIGRAM(S): at 17:27

## 2024-12-13 RX ADMIN — Medication 3: at 00:01

## 2024-12-13 RX ADMIN — ENOXAPARIN SODIUM 40 MILLIGRAM(S): 30 INJECTION SUBCUTANEOUS at 06:14

## 2024-12-13 RX ADMIN — PIPERACILLIN SODIUM AND TAZOBACTAM SODIUM 25 GRAM(S): 4; .5 INJECTION, POWDER, LYOPHILIZED, FOR SOLUTION INTRAVENOUS at 00:33

## 2024-12-13 RX ADMIN — Medication 250 MILLIGRAM(S): at 06:13

## 2024-12-13 RX ADMIN — Medication 5 UNIT(S): at 17:21

## 2024-12-13 RX ADMIN — INSULIN GLARGINE 14 UNIT(S): 100 INJECTION, SOLUTION SUBCUTANEOUS at 00:42

## 2024-12-13 RX ADMIN — PIPERACILLIN SODIUM AND TAZOBACTAM SODIUM 25 GRAM(S): 4; .5 INJECTION, POWDER, LYOPHILIZED, FOR SOLUTION INTRAVENOUS at 06:13

## 2024-12-13 RX ADMIN — Medication 1: at 22:14

## 2024-12-13 RX ADMIN — Medication 3: at 08:02

## 2024-12-13 RX ADMIN — Medication 2: at 17:22

## 2024-12-13 RX ADMIN — Medication 5 UNIT(S): at 12:02

## 2024-12-13 NOTE — CONSULT NOTE ADULT - PROBLEM SELECTOR RECOMMENDATION 9
uncontrolled with hyperglycemia  change lantus to 25 units  cont admelog 5 ac tid  fsg ac and hs  d/w pt need for basal/bolus insulin  nutrition eval

## 2024-12-13 NOTE — CONSULT NOTE ADULT - ASSESSMENT
Left foot and leg Cellulitis  Doubt Left 1st toe Osteomyelitis  based on MRI / X- ray of foot ( I reviewed films) and the superficial nature of her wound  Leukocytosis - normalized      Plan - Cont Vancomycin 1gm iv q12hrs  Cont Zosyn 3.375gms iv q8hrs for now.  will likely not treat as Osteomyelitis as clinically it does not appear so.

## 2024-12-13 NOTE — PROGRESS NOTE ADULT - ASSESSMENT
Patient is a 64 year old female from home ambulates independently, with PMH of  T2DM (on insulin pump) who presented to the ED with a L hallux x 3-4d. Patient states she went to her podiatrist a few days ago and he noticed the wound when he clipped her nail and prescribed her doxycycline 100mg BID, of which she's taken 2 full days.   Patient is being admitted to r/o acute osteomyelitis of L hallux and uncontrolled DM. Podiatry/ID and Endocrine consulted. Pending MRI and L Foot XR. Patient is a 64 year old female from home ambulates independently, with PMH of  T2DM (on insulin pump) who presented to the ED with a L hallux x 3-4d. Patient states she went to her podiatrist a few days ago and he noticed the wound when he clipped her nail and prescribed her doxycycline 100mg BID, of which she's taken 2 full days.   Patient is being admitted to r/o acute osteomyelitis of L hallux and uncontrolled DM. Podiatry/ID and Endocrine consulted. MR shows Early OM/Charcot arthropathy at the midfoot. on Vanco/Zosyn. Insulin adjusted.

## 2024-12-13 NOTE — CONSULT NOTE ADULT - ASSESSMENT
Patient is a 64 year old female from home ambulates independently, with PMH of  T2DM (on insulin pump) who presented to the ED with a L hallux x 3-4d.   Endocrine consulted for dm management. Pt takes novolog via Cequer/ Simplicity without basal insulin. Chacks fsg 200-300s usually. Denies hypoglycemia. F/u with endo- Dr. Grace.

## 2024-12-13 NOTE — CONSULT NOTE ADULT - SKIN COMMENTS
left 1st toe linear superficial laceration and what appears to be bruising of the tip of the toe, no drainage , mild swelling of left foot and lower leg with warmth of skin, no probing to bone

## 2024-12-13 NOTE — PROGRESS NOTE ADULT - SUBJECTIVE AND OBJECTIVE BOX
NP Note discussed with  Primary Attending    INTERVAL HPI/OVERNIGHT EVENTS: seen at bedside, no acute overnight event. no new complaints    MEDICATIONS  (STANDING):  enoxaparin Injectable 40 milliGRAM(s) SubCutaneous every 24 hours  insulin glargine Injectable (LANTUS) 14 Unit(s) SubCutaneous at bedtime  insulin lispro (ADMELOG) corrective regimen sliding scale   SubCutaneous three times a day before meals  insulin lispro (ADMELOG) corrective regimen sliding scale   SubCutaneous at bedtime  insulin lispro Injectable (ADMELOG) 5 Unit(s) SubCutaneous three times a day before meals  piperacillin/tazobactam IVPB.. 3.375 Gram(s) IV Intermittent every 8 hours  vancomycin  IVPB 1000 milliGRAM(s) IV Intermittent every 12 hours  vancomycin  IVPB        MEDICATIONS  (PRN):  acetaminophen     Tablet .. 650 milliGRAM(s) Oral every 6 hours PRN Temp greater or equal to 38C (100.4F), Mild Pain (1 - 3)      __________________________________________________  REVIEW OF SYSTEMS:    CONSTITUTIONAL: No fever,   EYES: no acute visual disturbances  NECK: No pain or stiffness  RESPIRATORY: No cough; No shortness of breath  CARDIOVASCULAR: No chest pain, no palpitations  GASTROINTESTINAL: No pain. No nausea or vomiting; No diarrhea   NEUROLOGICAL: No headache or numbness, no tremors  MUSCULOSKELETAL: No joint pain, no muscle pain  GENITOURINARY: no dysuria, no frequency, no hesitancy  PSYCHIATRY: no depression , no anxiety  ALL OTHER  ROS negative        Vital Signs Last 24 Hrs  T(C): 36.8 (13 Dec 2024 05:13), Max: 37.3 (12 Dec 2024 18:06)  T(F): 98.2 (13 Dec 2024 05:13), Max: 99.2 (12 Dec 2024 18:06)  HR: 85 (13 Dec 2024 05:13) (73 - 96)  BP: 141/61 (13 Dec 2024 05:13) (139/61 - 161/82)  BP(mean): --  RR: 18 (13 Dec 2024 05:13) (16 - 19)  SpO2: 95% (13 Dec 2024 05:13) (95% - 99%)    Parameters below as of 13 Dec 2024 05:13  Patient On (Oxygen Delivery Method): room air        ________________________________________________  PHYSICAL EXAM:  GENERAL: NAD  HEENT: Normocephalic;  conjunctivae and sclerae clear; moist mucous membranes;   NECK : supple  CHEST/LUNG: Clear to auscultation bilaterally with good air entry   HEART: S1 S2  regular; no murmurs, gallops or rubs  ABDOMEN: Soft, Nontender, Nondistended; Bowel sounds present  EXTREMITIES: no cyanosis; no edema; no calf tenderness  SKIN: warm and dry; left foot great toe ulceration with dark discoloration  NERVOUS SYSTEM:  Awake and alert; Oriented  to place, person and time ; no new deficits    _________________________________________________  LABS:                        11.4   9.12  )-----------( 221      ( 13 Dec 2024 06:49 )             31.9     12-13    136  |  105  |  16  ----------------------------<  290[H]  3.6   |  27  |  0.76    Ca    8.2[L]      13 Dec 2024 06:49  Phos  2.9     12-13  Mg     2.1     12-13    TPro  8.3  /  Alb  3.4[L]  /  TBili  0.5  /  DBili  x   /  AST  16  /  ALT  22  /  AlkPhos  114  12-12      Urinalysis Basic - ( 13 Dec 2024 06:49 )    Color: x / Appearance: x / SG: x / pH: x  Gluc: 290 mg/dL / Ketone: x  / Bili: x / Urobili: x   Blood: x / Protein: x / Nitrite: x   Leuk Esterase: x / RBC: x / WBC x   Sq Epi: x / Non Sq Epi: x / Bacteria: x      CAPILLARY BLOOD GLUCOSE      POCT Blood Glucose.: 254 mg/dL (13 Dec 2024 07:41)  POCT Blood Glucose.: 363 mg/dL (12 Dec 2024 23:56)        RADIOLOGY & ADDITIONAL TESTS:    Imaging  Reviewed:  YES  radiology result in progress    Consultant(s) Notes Reviewed:   YES      Plan of care was discussed with patient and /or primary care giver; all questions and concerns were addressed  NP Note discussed with  Primary Attending    INTERVAL HPI/OVERNIGHT EVENTS: seen at bedside, no acute overnight event. no new complaints    MEDICATIONS  (STANDING):  enoxaparin Injectable 40 milliGRAM(s) SubCutaneous every 24 hours  insulin glargine Injectable (LANTUS) 14 Unit(s) SubCutaneous at bedtime  insulin lispro (ADMELOG) corrective regimen sliding scale   SubCutaneous three times a day before meals  insulin lispro (ADMELOG) corrective regimen sliding scale   SubCutaneous at bedtime  insulin lispro Injectable (ADMELOG) 5 Unit(s) SubCutaneous three times a day before meals  piperacillin/tazobactam IVPB.. 3.375 Gram(s) IV Intermittent every 8 hours  vancomycin  IVPB 1000 milliGRAM(s) IV Intermittent every 12 hours  vancomycin  IVPB        MEDICATIONS  (PRN):  acetaminophen     Tablet .. 650 milliGRAM(s) Oral every 6 hours PRN Temp greater or equal to 38C (100.4F), Mild Pain (1 - 3)      __________________________________________________  REVIEW OF SYSTEMS:    CONSTITUTIONAL: No fever,   EYES: no acute visual disturbances  NECK: No pain or stiffness  RESPIRATORY: No cough; No shortness of breath  CARDIOVASCULAR: No chest pain, no palpitations  GASTROINTESTINAL: No pain. No nausea or vomiting; No diarrhea   NEUROLOGICAL: No headache or numbness, no tremors  MUSCULOSKELETAL: No joint pain, no muscle pain  GENITOURINARY: no dysuria, no frequency, no hesitancy  PSYCHIATRY: no depression , no anxiety  ALL OTHER  ROS negative        Vital Signs Last 24 Hrs  T(C): 36.8 (13 Dec 2024 05:13), Max: 37.3 (12 Dec 2024 18:06)  T(F): 98.2 (13 Dec 2024 05:13), Max: 99.2 (12 Dec 2024 18:06)  HR: 85 (13 Dec 2024 05:13) (73 - 96)  BP: 141/61 (13 Dec 2024 05:13) (139/61 - 161/82)  BP(mean): --  RR: 18 (13 Dec 2024 05:13) (16 - 19)  SpO2: 95% (13 Dec 2024 05:13) (95% - 99%)    Parameters below as of 13 Dec 2024 05:13  Patient On (Oxygen Delivery Method): room air        ________________________________________________  PHYSICAL EXAM:  GENERAL: NAD  HEENT: Normocephalic;  conjunctivae and sclerae clear; moist mucous membranes;   NECK : supple  CHEST/LUNG: Clear to auscultation bilaterally with good air entry   HEART: S1 S2  regular; no murmurs, gallops or rubs  ABDOMEN: Soft, Nontender, Nondistended; Bowel sounds present  EXTREMITIES: no cyanosis; no edema; no calf tenderness  SKIN: warm and dry; left foot great toe ulceration with dark discoloration  NERVOUS SYSTEM:  Awake and alert; Oriented  to place, person and time ; no new deficits    _________________________________________________  LABS:                        11.4   9.12  )-----------( 221      ( 13 Dec 2024 06:49 )             31.9     12-13    136  |  105  |  16  ----------------------------<  290[H]  3.6   |  27  |  0.76    Ca    8.2[L]      13 Dec 2024 06:49  Phos  2.9     12-13  Mg     2.1     12-13    TPro  8.3  /  Alb  3.4[L]  /  TBili  0.5  /  DBili  x   /  AST  16  /  ALT  22  /  AlkPhos  114  12-12      Urinalysis Basic - ( 13 Dec 2024 06:49 )    Color: x / Appearance: x / SG: x / pH: x  Gluc: 290 mg/dL / Ketone: x  / Bili: x / Urobili: x   Blood: x / Protein: x / Nitrite: x   Leuk Esterase: x / RBC: x / WBC x   Sq Epi: x / Non Sq Epi: x / Bacteria: x      CAPILLARY BLOOD GLUCOSE      POCT Blood Glucose.: 254 mg/dL (13 Dec 2024 07:41)  POCT Blood Glucose.: 363 mg/dL (12 Dec 2024 23:56)        RADIOLOGY & ADDITIONAL TESTS:    Imaging  Reviewed:  YES  < from: MR Foot No Cont, Left (12.13.24 @ 11:42) >    ACC: [id number] EXAM:  MR FOOT LT   ORDERED BY: FARZANA SÁNCHEZ     PROCEDURE DATE:  12/13/2024          INTERPRETATION:  Exam Type: MR FOOT LEFT  Exam Date and Time: 12/13/2024 11:42 AM  Indication: Left hallux wound. Concern for osteomyelitis.  Comparison: Foot x-rays from one day prior.    TECHNIQUE:  Multiplanar multisequence MRI of the left ankle was performed.    FINDINGS:  There is a soft tissue wound about the tip of the great toe. Mild   surrounding inflammatory changes are present. There is no organized fluid   collection seen at this location. Deep to the wound, there is no low T1   signal identified within the distal phalanx of the great toe. Scattered   patchy high STIR signal is seen. There is no fracture or dislocation.   There are findings of early Charcot arthropathy at the midfoot. There is   no tenosynovitis. Fatty atrophy of the intrinsic forefoot musculature.   Dorsal foot subcutaneous tissue swelling.      IMPRESSION:  Soft tissue wound about the great toe with no low T1 signal and patchy   high STIR signal within the distal phalanx of the great toe deep to the   wound. Findings may reflect reactive osseous changes with early   osteomyelitis also a consideration within the differential diagnosis.    Early Charcot arthropathy at the midfoot.    --- End of Report ---            ZOHRA MADERA MD; Attending Radiologist  This document has been electronically signed. Dec 13 2024 12:06PM    < end of copied text >  < from: Xray Foot AP + Lateral, Left (12.12.24 @ 17:18) >    ACC: [id number] EXAM:  XR FOOT 2 VIEWS LT   ORDERED BY: NICOLÁS REYES     ACC: [id number] EXAM:  XR CHEST PA LAT 2V   ORDERED BY: NICOLÁS REYES     PROCEDURE DATE:  12/12/2024          INTERPRETATION:  Toe pain.    AP chest. Prior 12/20/2016.    Low lung volumes. No change heart mediastinum. Grossly clear lungs.    IMPRESSION: Shallow inspiration. Grossly clear lungs.    --- End of Report ---            ELDA CASEY MD; Attending Radiologist  This document has been electronically signed. Dec 13 2024 11:45AM    < end of copied text >      Consultant(s) Notes Reviewed:   YES      Plan of care was discussed with patient and /or primary care giver; all questions and concerns were addressed

## 2024-12-13 NOTE — PROGRESS NOTE ADULT - SUBJECTIVE AND OBJECTIVE BOX
Interval: Patient seen resting comfortably at bedside in no acute distress. No acute overnight events noted. Patient states she continues to have pain to the left foot and ankle. Her dressings are clean, dry, and intact with no strikethrough. Patient denies fever, chills, nausea, or vomiting. She denies any new pedal complaints.     Podiatry HPI: Podiatry consulted for 64-year-old female with chief complaint of left foot diabetic infection. Patient sent in by her podiatrist, Dr. Delarosa. States she saw Dr. Delarosa last week who noticed an ulceration on her left hallux. Patient states she was prescribed Doxycycline 100 mg BID and was instructed to apply Mupirocin ointment to the wound. She comments that she has taken 2 days worth of the Doxycycline.  Patient states the wound has worsened in appearance over the last week and was instructed by her podiatrist to come in to the ED. She also complains of longstanding redness and swelling to the left foot for over a month now. Patient complains of subjective fever and chills. Denies n/v.     Medications acetaminophen     Tablet .. 650 milliGRAM(s) Oral every 6 hours PRN  enoxaparin Injectable 40 milliGRAM(s) SubCutaneous every 24 hours  insulin glargine Injectable (LANTUS) 14 Unit(s) SubCutaneous at bedtime  insulin lispro (ADMELOG) corrective regimen sliding scale   SubCutaneous three times a day before meals  insulin lispro (ADMELOG) corrective regimen sliding scale   SubCutaneous at bedtime  insulin lispro Injectable (ADMELOG) 5 Unit(s) SubCutaneous three times a day before meals  piperacillin/tazobactam IVPB.. 3.375 Gram(s) IV Intermittent every 8 hours  vancomycin  IVPB 1000 milliGRAM(s) IV Intermittent every 12 hours  vancomycin  IVPB        FH,   PMHDM (diabetes mellitus)    HTN (hypertension)    Cataract of both eyes, unspecified cataract type       PSHNo significant past surgical history    Cataract of both eyes, unspecified cataract type    History of cholecystectomy        Labs                          11.4   9.12  )-----------( 221      ( 13 Dec 2024 06:49 )             31.9      12-13    136  |  105  |  16  ----------------------------<  290[H]  3.6   |  27  |  0.76    Ca    8.2[L]      13 Dec 2024 06:49  Phos  2.9     12-13  Mg     2.1     12-13    TPro  8.3  /  Alb  3.4[L]  /  TBili  0.5  /  DBili  x   /  AST  16  /  ALT  22  /  AlkPhos  114  12-12     Vital Signs Last 24 Hrs  T(C): 36.8 (13 Dec 2024 05:13), Max: 37.3 (12 Dec 2024 18:06)  T(F): 98.2 (13 Dec 2024 05:13), Max: 99.2 (12 Dec 2024 18:06)  HR: 85 (13 Dec 2024 05:13) (73 - 96)  BP: 141/61 (13 Dec 2024 05:13) (139/61 - 161/82)  BP(mean): --  RR: 18 (13 Dec 2024 05:13) (16 - 19)  SpO2: 95% (13 Dec 2024 05:13) (95% - 99%)    Parameters below as of 13 Dec 2024 05:13  Patient On (Oxygen Delivery Method): room air      Sedimentation Rate, Erythrocyte: 67 mm/Hr (12-12-24 @ 19:34)  Sedimentation Rate, Erythrocyte: 67 mm/Hr (12-12-24 @ 15:35)         C-Reactive Protein: 121.0 mg/L (12-12-24 @ 15:35)   WBC Count: 9.12 K/uL (12-13-24 @ 06:49)  WBC Count: 12.67 K/uL *H* (12-12-24 @ 15:35)      Physical Exam:  Vasc: DP pulses palpable 1/4 b/l. TG warm to warm with mild increase in temperature to left hallux around wound. Pedal hair present. Diffuse erythema and 1+ pitting edema noted to entire left foot and leg.   Derm: Area of necrosis noted to distal tip of left hallux. Small wound measuring 0.5 x 1.0 cm with fibrograunlar wound base. Serous drainage noted. Wound edges are macerated. Tunneling noted medially about 0.7 cm. No Fluctuance/crepitus/streaking noted. Malodor noted.  Neuro: Gross sensation diminished via Ipswitch 0/4 B/L   Msk: Deferred     IMAGING:  Xray Left foot: Pending final read  Evidence of charcot changes   No signs of soft tissue emphysema

## 2024-12-13 NOTE — PROGRESS NOTE ADULT - NS ATTEND AMEND GEN_ALL_CORE FT
Patient seen and examined at bedside this morning. Noted to still have pain on the dorsum of L foot, but when covered with the ace wrap she feels its better protected. The ankle region still with redness/warmth. Had some mild chills overnight but no fever.   Vitals, labs, imaging reviewed as above. Afebrile, vss, Wbc 9, Na 136 however gluc 290, esr 61,   PE – nontoxic appearing female, NAD, NC/AT, RRR, lungs CTA b/l, abd soft ntnd, RLE wnl, LLE with great toe 2cm cut with small area of darkened blood under the skin at tip of toe, appearing ecchymotic, L foot dorsum and ankle with warmth, erythema, mild edema     A/P  63yo F with pmh of uncontrolled IDDM who presents for worsening foot wound concerning for infection on L foot, admitted for diabetic foot infection c/f OM with overlying cellulitis.      #Left Foot great toe ulceration with overlying cellulitis   #R/o OM   #Diabetic foot infection  #Uncontrolled DM with hyperglycemia   #Charcot arthropathy    -trend inflammatory markers, trend cbc   -c/w vancomycin, zosyn   -ID consult    -f/u bcx   -podiatry consulted, f/u recs re MRI findings below  -MRI L foot noted with reactive osseous changes with early osteomyelitis also a consideration within the differential diagnosis. Early Charcot arthropathy at the midfoot.  -per podiatry, NWB to L foot   -f/u XR L foot read   -med rec   -check a1c, endo consulted   -monitor FS/ISS, lantus increased to 25u for tonight (she is not on lantus at home), lispro 5u tid (takes sliding scale at home) per endo recs  -pain control prn   -dvt ppx

## 2024-12-13 NOTE — PROGRESS NOTE ADULT - PROBLEM SELECTOR PLAN 1
-p/w LLE wound, TTP, edema, erythema, VSS in the ED  -WBC 12K, Lactate 1.6,   -s/p doxycycline 2d and mupirocin at home, 1L in the ED  -start zosyn and vanco  -c/w Tylenol PRN for pain, NWB to LLE  -f/u BCx  -f/u Wound Cx  -f/u podiatry reccs   -f/u ID reccs :dr. Mendoza  --f/u MR [facility] foot -MR left foot: Soft tissue wound about the great toe with no low T1 signal and patchy high STIR signal within the distal phalanx of the great toe deep to the wound. Findings may reflect reactive osseous changes with early   osteomyelitis also a consideration within the differential diagnosis.  Early Charcot arthropathy at the midfoot.  -podiatry following, f/u reccs   -ID dr. Mckenzie consulted  -c/w Vanco/Zosyn, f/u trough

## 2024-12-13 NOTE — PROGRESS NOTE ADULT - ASSESSMENT
A: Left Hallux diabetic ulceration  Charcot neuroarthropathy, left foot     P:  Patient evaluated and Chart reviewed  Vitals stable, leukocytosis absent  Xrays evaluated - pending final read. Evidence of Charcot changes noted to left midfoot. No signs of soft tissue emphysema   Pending MRI of Left foot to evaluate for osteomyelitis of the hallux / Charcot deformity   Patient states last A1C was 10% - stressed the importance of proper glycemic control, daily foot examinations, and wearing proper shoe gear   Discussed diagnosis with patient and explained that she is to be NWB to the left foot to prevent worsening of Charcot deformity  Patient states understanding. Will get PT consult when on the floors so patient can be trained to be NWB with crutches or walker   Continue antibiotics per ID/IM  Left foot was dressed with Betadine/DSD   Podiatry to follow while in house  Seen with Dr. Elizabeth and discussed with Attending Dr. Palma

## 2024-12-13 NOTE — PROGRESS NOTE ADULT - PROBLEM SELECTOR PLAN 2
-on insulin lispro pump at home  -UA Glucosuria, CMP glc of 469, last known a1c 10%  -started 14 lantus, 5 TID based on 0.4mg/kg  -c/w ISS  -POCTs ACHS  -f/u a1c  -Endo dr Vang  consulted -p/w LLE wound, TTP, edema, erythema, VSS in the ED  -WBC 12K, Lactate 1.6,   -s/p doxycycline 2d and mupirocin at home, 1L in the ED  -start zosyn and vanco  -c/w Tylenol PRN for pain, NWB to LLE  -f/u BCx  -f/u Wound Cx  -f/u podiatry reccs   -f/u ID reccs :dr. Mendoza  -MR left foot above

## 2024-12-13 NOTE — CONSULT NOTE ADULT - SUBJECTIVE AND OBJECTIVE BOX
Patient is a 64y old  Female who presents with a chief complaint of L hallux injury (13 Dec 2024 11:17)      HPI:  Patient is a 64 year old female from home ambulates independently, with PMH of  T2DM (on insulin pump) who presented to the ED with a L hallux x 3-4d. Patient states she went to her podiatrist a few days ago and he noticed the wound when he clipped her nail and prescribed her doxycycline 100mg BID, of which she's taken 2 full days. She also began applying mupirocin. Patient also endorses chills, body aches that began 2-3d ago and states the LLE edema and erythema also began around the same time. Patient states she previously had a R foot injury many years ago.     Patient denies nausea, vomiting, headache, blurry vision, dizziness, chest pain, abdominal pain, constipation, diarrhea, leg swelling.  Endocrine consulted for dm management. Pt takes novolog via Cequer/ Simplicity without basal insulin. Chacks fsg 200-300s usually. Denies hypoglycemia. F/u with endo- Dr. Grace.      PAST MEDICAL & SURGICAL HISTORY:  DM (diabetes mellitus)      Cataract of both eyes, unspecified cataract type      Cataract of both eyes, unspecified cataract type      History of cholecystectomy             MEDICATIONS  (STANDING):  enoxaparin Injectable 40 milliGRAM(s) SubCutaneous every 24 hours  insulin glargine Injectable (LANTUS) 14 Unit(s) SubCutaneous at bedtime  insulin lispro (ADMELOG) corrective regimen sliding scale   SubCutaneous three times a day before meals  insulin lispro (ADMELOG) corrective regimen sliding scale   SubCutaneous at bedtime  insulin lispro Injectable (ADMELOG) 5 Unit(s) SubCutaneous three times a day before meals  piperacillin/tazobactam IVPB.. 3.375 Gram(s) IV Intermittent every 8 hours  vancomycin  IVPB 1000 milliGRAM(s) IV Intermittent every 12 hours  vancomycin  IVPB        MEDICATIONS  (PRN):  acetaminophen     Tablet .. 650 milliGRAM(s) Oral every 6 hours PRN Temp greater or equal to 38C (100.4F), Mild Pain (1 - 3)      FAMILY HISTORY:      SOCIAL HISTORY:      REVIEW OF SYSTEMS:  CONSTITUTIONAL: No fever, weight loss, or fatigue  EYES: No eye pain, visual disturbances, or discharge  ENT:  No difficulty hearing, tinnitus, vertigo; No sinus or throat pain  NECK: No pain or stiffness  RESPIRATORY: No cough, wheezing, chills or hemoptysis; No Shortness of Breath  CARDIOVASCULAR: No chest pain, palpitations, passing out, dizziness, or leg swelling  GASTROINTESTINAL: No abdominal or epigastric pain. No nausea, vomiting, or hematemesis; No diarrhea or constipation. No melena or hematochezia.  GENITOURINARY: No dysuria, frequency, hematuria, or incontinence  NEUROLOGICAL: No headaches, memory loss, loss of strength, numbness, or tremors  SKIN: No itching, burning, rashes, or lesions   LYMPH Nodes: No enlarged glands  ENDOCRINE: No heat or cold intolerance; No hair loss  MUSCULOSKELETAL: No joint pain or swelling; No muscle, back, or extremity pain  PSYCHIATRIC: No depression, anxiety, mood swings, or difficulty sleeping  HEME/LYMPH: No easy bruising, or bleeding gums  ALLERGY AND IMMUNOLOGIC: No hives or eczema	        Vital Signs Last 24 Hrs  T(C): 36.8 (13 Dec 2024 05:13), Max: 37.3 (12 Dec 2024 18:06)  T(F): 98.2 (13 Dec 2024 05:13), Max: 99.2 (12 Dec 2024 18:06)  HR: 85 (13 Dec 2024 05:13) (73 - 96)  BP: 141/61 (13 Dec 2024 05:13) (139/61 - 161/82)  BP(mean): --  RR: 18 (13 Dec 2024 05:13) (16 - 19)  SpO2: 95% (13 Dec 2024 05:13) (95% - 99%)    Parameters below as of 13 Dec 2024 05:13  Patient On (Oxygen Delivery Method): room air          Constitutional:    HEENT: nad    Neck:  No JVD, bruits or thyromegaly    Respiratory:  Clear without rales or rhonchi    Cardiovascular:  RR without murmur, rub or gallop.    Gastrointestinal: Soft without hepatosplenomegaly.    Extremities: without cyanosis, clubbing or edema.    Neurological:  Oriented   x    3  . No gross sensory or motor defects.        LABS:                        11.4   9.12  )-----------( 221      ( 13 Dec 2024 06:49 )             31.9     12-13    136  |  105  |  16  ----------------------------<  290[H]  3.6   |  27  |  0.76    Ca    8.2[L]      13 Dec 2024 06:49  Phos  2.9     12-13  Mg     2.1     12-13    TPro  8.3  /  Alb  3.4[L]  /  TBili  0.5  /  DBili  x   /  AST  16  /  ALT  22  /  AlkPhos  114  12-12          Urinalysis Basic - ( 13 Dec 2024 06:49 )    Color: x / Appearance: x / SG: x / pH: x  Gluc: 290 mg/dL / Ketone: x  / Bili: x / Urobili: x   Blood: x / Protein: x / Nitrite: x   Leuk Esterase: x / RBC: x / WBC x   Sq Epi: x / Non Sq Epi: x / Bacteria: x      CAPILLARY BLOOD GLUCOSE      POCT Blood Glucose.: 380 mg/dL (13 Dec 2024 11:52)  POCT Blood Glucose.: 254 mg/dL (13 Dec 2024 07:41)  POCT Blood Glucose.: 363 mg/dL (12 Dec 2024 23:56)      RADIOLOGY & ADDITIONAL STUDIES:

## 2024-12-13 NOTE — PROGRESS NOTE ADULT - PROBLEM SELECTOR PLAN 3
-likely due to uncontrolled DM  -Na 128  -monitor BMP   -cw DM control  RESOLVED -on insulin lispro pump at home  -UA Glucosuria, CMP glc of 469, last known a1c 10%  -c/w ISS  -FS ACHS  -f/u a1c  -Endo dr Bhagat  consulted, recc appreciated  -started 25 unit lantus and premeal admelog 5U TID

## 2024-12-13 NOTE — CONSULT NOTE ADULT - SUBJECTIVE AND OBJECTIVE BOX
HPI:  Patient is a 64 year old female from home ambulates independently, with PMH of  T2DM (on insulin pump) who presented to the ED with a L hallux wound x 3-4d. Patient states she went to her podiatrist a few days ago and he noticed the wound when he clipped her nail and prescribed her doxycycline 100mg BID, of which she's taken 2 full days. She also began applying mupirocin. Patient also endorses chills, body aches that began 2-3d ago and states the LLE edema and erythema also began around the same time. Patient states she previously had a R foot injury many years ago.   Patient denies nausea, vomiting, headache, blurry vision, dizziness, chest pain, abdominal pain, constipation, diarrhea, leg swelling.                  PAST MEDICAL & SURGICAL HISTORY:  DM (diabetes mellitus)      Cataract of both eyes, unspecified cataract type      Cataract of both eyes, unspecified cataract type      History of cholecystectomy          No Known Allergies      Meds:  acetaminophen     Tablet .. 650 milliGRAM(s) Oral every 6 hours PRN  enoxaparin Injectable 40 milliGRAM(s) SubCutaneous every 24 hours  insulin glargine Injectable (LANTUS) 25 Unit(s) SubCutaneous at bedtime  insulin lispro (ADMELOG) corrective regimen sliding scale   SubCutaneous three times a day before meals  insulin lispro (ADMELOG) corrective regimen sliding scale   SubCutaneous at bedtime  insulin lispro Injectable (ADMELOG) 5 Unit(s) SubCutaneous three times a day before meals  piperacillin/tazobactam IVPB.. 3.375 Gram(s) IV Intermittent every 8 hours  vancomycin  IVPB 1000 milliGRAM(s) IV Intermittent every 12 hours  vancomycin  IVPB          SOCIAL HISTORY:  Smoker:  YES / NO        PACK YEARS:                         WHEN QUIT?  ETOH use:  YES / NO               FREQUENCY / QUANTITY:  Ilicit Drug use:  YES / NO  Occupation:  Assisted device use (Cane / Walker):  Live with:    FAMILY HISTORY:      VITALS:  Vital Signs Last 24 Hrs  T(C): 36.8 (13 Dec 2024 13:46), Max: 37.3 (12 Dec 2024 18:06)  T(F): 98.3 (13 Dec 2024 13:46), Max: 99.2 (12 Dec 2024 18:06)  HR: 87 (13 Dec 2024 13:46) (85 - 96)  BP: 142/74 (13 Dec 2024 13:46) (141/61 - 161/82)  BP(mean): --  RR: 18 (13 Dec 2024 13:46) (16 - 19)  SpO2: 99% (13 Dec 2024 13:46) (95% - 99%)    Parameters below as of 13 Dec 2024 13:46  Patient On (Oxygen Delivery Method): room air        LABS/DIAGNOSTIC TESTS:                          11.4   9.12  )-----------( 221      ( 13 Dec 2024 06:49 )             31.9     WBC Count: 9.12 K/uL (12-13 @ 06:49)  WBC Count: 12.67 K/uL (12-12 @ 15:35)      12-13    136  |  105  |  16  ----------------------------<  290[H]  3.6   |  27  |  0.76    Ca    8.2[L]      13 Dec 2024 06:49  Phos  2.9     12-13  Mg     2.1     12-13    TPro  8.3  /  Alb  3.4[L]  /  TBili  0.5  /  DBili  x   /  AST  16  /  ALT  22  /  AlkPhos  114  12-12      Urinalysis (12.12.24 @ 15:35)   Glucose Qualitative, Urine: >=1000 mg/dL  Blood, Urine: Negative  pH Urine: 5.0  Color: Yellow  Urine Appearance: Clear  Bilirubin: Negative  Ketone - Urine: 15 mg/dL  Specific Gravity: 1.031  Protein, Urine: Negative mg/dL  Urobilinogen: 0.2 mg/dL  Nitrite: Negative  Leukocyte Esterase Concentration: Negative      LIVER FUNCTIONS - ( 12 Dec 2024 15:35 )  Alb: 3.4 g/dL / Pro: 8.3 g/dL / ALK PHOS: 114 U/L / ALT: 22 U/L DA / AST: 16 U/L / GGT: x                 LACTATE:    ABG -     CULTURES:       RADIOLOGY:      ROS  [  ] UNABLE TO ELICIT               HPI:  Patient is a 64 year old female from home ambulates independently, with PMH of  T2DM (on insulin pump) who presented to the ED with a L hallux wound x 3-4d. Patient states she went to her podiatrist a few days ago and he noticed the wound when he clipped her nail and prescribed her doxycycline 100mg BID, of which she's taken 2 full days. She also began applying mupirocin. Patient also endorses chills, body aches that began 2-3d ago and states the LLE edema and erythema also began around the same time. Patient states she previously had a R foot injury many years ago.   Patient denies nausea, vomiting, headache, blurry vision, dizziness, chest pain, abdominal pain, constipation, diarrhea, leg swelling.        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 She denies any trauma at all to her foot or ill fitting shoes. She denies any fevers or chills. MRI shows some reactive osseus changes but early Osteomyelitis can not be ruled out.          PAST MEDICAL & SURGICAL HISTORY:  DM (diabetes mellitus)      Cataract of both eyes, unspecified cataract type      Cataract of both eyes, unspecified cataract type      History of cholecystectomy          No Known Allergies      Meds:  acetaminophen     Tablet .. 650 milliGRAM(s) Oral every 6 hours PRN  enoxaparin Injectable 40 milliGRAM(s) SubCutaneous every 24 hours  insulin glargine Injectable (LANTUS) 25 Unit(s) SubCutaneous at bedtime  insulin lispro (ADMELOG) corrective regimen sliding scale   SubCutaneous three times a day before meals  insulin lispro (ADMELOG) corrective regimen sliding scale   SubCutaneous at bedtime  insulin lispro Injectable (ADMELOG) 5 Unit(s) SubCutaneous three times a day before meals  piperacillin/tazobactam IVPB.. 3.375 Gram(s) IV Intermittent every 8 hours  vancomycin  IVPB 1000 milliGRAM(s) IV Intermittent every 12 hours  vancomycin  IVPB          SOCIAL HISTORY:  Smoker:  no  ETOH use:  no    FAMILY HISTORY:      VITALS:  Vital Signs Last 24 Hrs  T(C): 36.8 (13 Dec 2024 13:46), Max: 37.3 (12 Dec 2024 18:06)  T(F): 98.3 (13 Dec 2024 13:46), Max: 99.2 (12 Dec 2024 18:06)  HR: 87 (13 Dec 2024 13:46) (85 - 96)  BP: 142/74 (13 Dec 2024 13:46) (141/61 - 161/82)  BP(mean): --  RR: 18 (13 Dec 2024 13:46) (16 - 19)  SpO2: 99% (13 Dec 2024 13:46) (95% - 99%)    Parameters below as of 13 Dec 2024 13:46  Patient On (Oxygen Delivery Method): room air        LABS/DIAGNOSTIC TESTS:                          11.4   9.12  )-----------( 221      ( 13 Dec 2024 06:49 )             31.9     WBC Count: 9.12 K/uL (12-13 @ 06:49)  WBC Count: 12.67 K/uL (12-12 @ 15:35)      12-13    136  |  105  |  16  ----------------------------<  290[H]  3.6   |  27  |  0.76    Ca    8.2[L]      13 Dec 2024 06:49  Phos  2.9     12-13  Mg     2.1     12-13    TPro  8.3  /  Alb  3.4[L]  /  TBili  0.5  /  DBili  x   /  AST  16  /  ALT  22  /  AlkPhos  114  12-12      Urinalysis (12.12.24 @ 15:35)   Glucose Qualitative, Urine: >=1000 mg/dL  Blood, Urine: Negative  pH Urine: 5.0  Color: Yellow  Urine Appearance: Clear  Bilirubin: Negative  Ketone - Urine: 15 mg/dL  Specific Gravity: 1.031  Protein, Urine: Negative mg/dL  Urobilinogen: 0.2 mg/dL  Nitrite: Negative  Leukocyte Esterase Concentration: Negative      LIVER FUNCTIONS - ( 12 Dec 2024 15:35 )  Alb: 3.4 g/dL / Pro: 8.3 g/dL / ALK PHOS: 114 U/L / ALT: 22 U/L DA / AST: 16 U/L / GGT: x                 LACTATE:    ABG -     CULTURES:       RADIOLOGY:< from: MR Foot No Cont, Left (12.13.24 @ 11:42) >  ACC: [id number] EXAM:  MR FOOT LT   ORDERED BY: FARZANA SÁNCHEZ     PROCEDURE DATE:  12/13/2024          INTERPRETATION:  Exam Type: MR FOOT LEFT  Exam Date and Time: 12/13/2024 11:42 AM  Indication: Left hallux wound. Concern for osteomyelitis.  Comparison: Foot x-rays from one day prior.    TECHNIQUE:  Multiplanar multisequence MRI of the left ankle was performed.    FINDINGS:  There is a soft tissue wound about the tip of the great toe. Mild   surrounding inflammatory changes are present. There is no organized fluid   collection seen at this location. Deep to the wound, there is no low T1   signal identified within the distal phalanx of the great toe. Scattered   patchy high STIR signal is seen. There is no fracture or dislocation.   There are findings of early Charcot arthropathy at the midfoot. There is   no tenosynovitis. Fatty atrophy of the intrinsic forefoot musculature.   Dorsal foot subcutaneous tissue swelling.      IMPRESSION:  Soft tissue wound about the great toe with no low T1 signal and patchy   high STIR signal within the distal phalanx of the great toe deep to the   wound. Findings may reflect reactive osseous changes with early   osteomyelitis also a consideration within the differential diagnosis.    Early Charcot arthropathy at the midfoot.    --- End of Report ---            ZOHRA MADERA MD; Attending Radiologist  This document has been electronically signed. Dec 13 2024 12:06PM    < end of copied text >  ----------------------------------------------------------------------------------------------------------------------  ACC: [id number] EXAM:  XR FOOT 2 VIEWS LT   ORDERED BY: NICOLÁS REYES     ACC: [id number] EXAM:  XR CHEST PA LAT 2V   ORDERED BY: NICOLÁS REYES     PROCEDURE DATE:  12/12/2024          INTERPRETATION:  Toe pain.    AP chest. Prior 12/20/2016.    Low lung volumes. No change heart mediastinum. Grossly clear lungs.    IMPRESSION: Shallow inspiration. Grossly clear lungs.    --- End of Report ---            ELDA CASEY MD; Attending Radiologist  This document has been electronically signed. Dec 13 2024 11:45AM    < end of copied text >        ROS  [  ] UNABLE TO ELICIT

## 2024-12-14 LAB
A1C WITH ESTIMATED AVERAGE GLUCOSE RESULT: >15.5 % — HIGH (ref 4–5.6)
ANION GAP SERPL CALC-SCNC: 2 MMOL/L — LOW (ref 5–17)
BUN SERPL-MCNC: 17 MG/DL — SIGNIFICANT CHANGE UP (ref 7–18)
CALCIUM SERPL-MCNC: 8.4 MG/DL — SIGNIFICANT CHANGE UP (ref 8.4–10.5)
CHLORIDE SERPL-SCNC: 109 MMOL/L — HIGH (ref 96–108)
CO2 SERPL-SCNC: 27 MMOL/L — SIGNIFICANT CHANGE UP (ref 22–31)
CREAT SERPL-MCNC: 0.77 MG/DL — SIGNIFICANT CHANGE UP (ref 0.5–1.3)
CRP SERPL-MCNC: 48.3 MG/L — HIGH (ref 0–5)
EGFR: 87 ML/MIN/1.73M2 — SIGNIFICANT CHANGE UP
ERYTHROCYTE [SEDIMENTATION RATE] IN BLOOD: 62 MM/HR — HIGH (ref 0–20)
ESTIMATED AVERAGE GLUCOSE: >398 MG/DL — HIGH (ref 68–114)
GLUCOSE BLDC GLUCOMTR-MCNC: 177 MG/DL — HIGH (ref 70–99)
GLUCOSE BLDC GLUCOMTR-MCNC: 239 MG/DL — HIGH (ref 70–99)
GLUCOSE BLDC GLUCOMTR-MCNC: 258 MG/DL — HIGH (ref 70–99)
GLUCOSE BLDC GLUCOMTR-MCNC: 273 MG/DL — HIGH (ref 70–99)
GLUCOSE SERPL-MCNC: 274 MG/DL — HIGH (ref 70–99)
HCT VFR BLD CALC: 29.9 % — LOW (ref 34.5–45)
HGB BLD-MCNC: 11 G/DL — LOW (ref 11.5–15.5)
MCHC RBC-ENTMCNC: 27.3 PG — SIGNIFICANT CHANGE UP (ref 27–34)
MCHC RBC-ENTMCNC: 36.8 G/DL — HIGH (ref 32–36)
MCV RBC AUTO: 74.2 FL — LOW (ref 80–100)
NRBC # BLD: 0 /100 WBCS — SIGNIFICANT CHANGE UP (ref 0–0)
PLATELET # BLD AUTO: 239 K/UL — SIGNIFICANT CHANGE UP (ref 150–400)
POTASSIUM SERPL-MCNC: 4 MMOL/L — SIGNIFICANT CHANGE UP (ref 3.5–5.3)
POTASSIUM SERPL-SCNC: 4 MMOL/L — SIGNIFICANT CHANGE UP (ref 3.5–5.3)
RBC # BLD: 4.03 M/UL — SIGNIFICANT CHANGE UP (ref 3.8–5.2)
RBC # FLD: 13.6 % — SIGNIFICANT CHANGE UP (ref 10.3–14.5)
SODIUM SERPL-SCNC: 138 MMOL/L — SIGNIFICANT CHANGE UP (ref 135–145)
VANCOMYCIN TROUGH SERPL-MCNC: 15.4 UG/ML — SIGNIFICANT CHANGE UP (ref 10–20)
WBC # BLD: 9.04 K/UL — SIGNIFICANT CHANGE UP (ref 3.8–10.5)
WBC # FLD AUTO: 9.04 K/UL — SIGNIFICANT CHANGE UP (ref 3.8–10.5)

## 2024-12-14 PROCEDURE — 99233 SBSQ HOSP IP/OBS HIGH 50: CPT

## 2024-12-14 RX ORDER — INSULIN GLARGINE 100 [IU]/ML
30 INJECTION, SOLUTION SUBCUTANEOUS AT BEDTIME
Refills: 0 | Status: DISCONTINUED | OUTPATIENT
Start: 2024-12-14 | End: 2024-12-15

## 2024-12-14 RX ADMIN — PIPERACILLIN SODIUM AND TAZOBACTAM SODIUM 25 GRAM(S): 4; .5 INJECTION, POWDER, LYOPHILIZED, FOR SOLUTION INTRAVENOUS at 14:25

## 2024-12-14 RX ADMIN — Medication 5 UNIT(S): at 12:16

## 2024-12-14 RX ADMIN — Medication 2: at 08:17

## 2024-12-14 RX ADMIN — PIPERACILLIN SODIUM AND TAZOBACTAM SODIUM 25 GRAM(S): 4; .5 INJECTION, POWDER, LYOPHILIZED, FOR SOLUTION INTRAVENOUS at 21:42

## 2024-12-14 RX ADMIN — Medication 250 MILLIGRAM(S): at 17:21

## 2024-12-14 RX ADMIN — Medication 3: at 12:17

## 2024-12-14 RX ADMIN — Medication 1: at 17:22

## 2024-12-14 RX ADMIN — Medication 7 UNIT(S): at 17:22

## 2024-12-14 RX ADMIN — Medication 250 MILLIGRAM(S): at 05:32

## 2024-12-14 RX ADMIN — ENOXAPARIN SODIUM 40 MILLIGRAM(S): 30 INJECTION SUBCUTANEOUS at 05:31

## 2024-12-14 RX ADMIN — Medication 1: at 21:41

## 2024-12-14 RX ADMIN — INSULIN GLARGINE 30 UNIT(S): 100 INJECTION, SOLUTION SUBCUTANEOUS at 21:41

## 2024-12-14 RX ADMIN — Medication 5 UNIT(S): at 08:16

## 2024-12-14 RX ADMIN — PIPERACILLIN SODIUM AND TAZOBACTAM SODIUM 25 GRAM(S): 4; .5 INJECTION, POWDER, LYOPHILIZED, FOR SOLUTION INTRAVENOUS at 05:31

## 2024-12-14 NOTE — PROGRESS NOTE ADULT - ASSESSMENT
Patient seen and examined at bedside this morning. Noted to have less pain in the foot today. She also says her redness around ankle has lessened. No fevers or chills noted. Asked for adjustment to diet so she doesnt get beef or chicken, only fish.  Vitals, labs, imaging reviewed as above. Afebrile, vss, Wbc 9, gluc 274, esr 62, crp 48  PE – nontoxic appearing female, NAD, NC/AT, RRR, lungs CTA b/l, abd soft ntnd, RLE wnl, LLE with great toe 2cm cut with small area of darkened blood under the skin at tip of toe, dressed with betadine, wrapped in ace wrap, L foot dorsum and ankle with warmth, erythema, mild edema     A/P  63yo F with pmh of uncontrolled IDDM who presents for worsening foot wound concerning for infection on L foot, admitted for diabetic foot infection c/f OM with overlying cellulitis.      #Left Foot great toe ulceration with overlying cellulitis   #R/o OM   #Diabetic foot infection  #Uncontrolled DM with hyperglycemia   #Charcot arthropathy    -trend inflammatory markers, trend cbc   -c/w vancomycin, zosyn   -ID consulted, f/u recs  -f/u bcx - ngtd  -podiatry consulted, f/u recs re MRI findings below  -MRI L foot noted with reactive osseous changes with early osteomyelitis also a consideration within the differential diagnosis. Early Charcot arthropathy at the midfoot.  -per podiatry, NWB to L foot   -noted a1c >15, endo consulted   -monitor FS/ISS, lantus increased to 30u for tonight (she is not on lantus at home), lispro 7u tid (takes sliding scale at home) per endo recs  -pain control prn   -nutrition eval  -dvt ppx

## 2024-12-14 NOTE — PROGRESS NOTE ADULT - SUBJECTIVE AND OBJECTIVE BOX
Vital Signs Last 24 Hrs  T(C): 37.1 (14 Dec 2024 05:01), Max: 37.4 (13 Dec 2024 21:01)  T(F): 98.7 (14 Dec 2024 05:01), Max: 99.3 (13 Dec 2024 21:01)  HR: 85 (14 Dec 2024 05:01) (85 - 89)  BP: 127/66 (14 Dec 2024 05:01) (127/66 - 137/56)  BP(mean): --  RR: 18 (14 Dec 2024 05:01) (16 - 18)  SpO2: 95% (14 Dec 2024 05:01) (95% - 98%)    Parameters below as of 14 Dec 2024 05:01  Patient On (Oxygen Delivery Method): room air                          11.0   9.04  )-----------( 239      ( 14 Dec 2024 04:15 )             29.9     12-14    138  |  109[H]  |  17  ----------------------------<  274[H]  4.0   |  27  |  0.77    Ca    8.4      14 Dec 2024 04:15  Phos  2.9     12-13  Mg     2.1     12-13    TPro  8.3  /  Alb  3.4[L]  /  TBili  0.5  /  DBili  x   /  AST  16  /  ALT  22  /  AlkPhos  114  12-12

## 2024-12-15 LAB
ANION GAP SERPL CALC-SCNC: 4 MMOL/L — LOW (ref 5–17)
BUN SERPL-MCNC: 11 MG/DL — SIGNIFICANT CHANGE UP (ref 7–18)
CALCIUM SERPL-MCNC: 8.6 MG/DL — SIGNIFICANT CHANGE UP (ref 8.4–10.5)
CHLORIDE SERPL-SCNC: 107 MMOL/L — SIGNIFICANT CHANGE UP (ref 96–108)
CO2 SERPL-SCNC: 27 MMOL/L — SIGNIFICANT CHANGE UP (ref 22–31)
CREAT SERPL-MCNC: 0.72 MG/DL — SIGNIFICANT CHANGE UP (ref 0.5–1.3)
EGFR: 94 ML/MIN/1.73M2 — SIGNIFICANT CHANGE UP
GLUCOSE BLDC GLUCOMTR-MCNC: 107 MG/DL — HIGH (ref 70–99)
GLUCOSE BLDC GLUCOMTR-MCNC: 125 MG/DL — HIGH (ref 70–99)
GLUCOSE BLDC GLUCOMTR-MCNC: 211 MG/DL — HIGH (ref 70–99)
GLUCOSE BLDC GLUCOMTR-MCNC: 308 MG/DL — HIGH (ref 70–99)
GLUCOSE SERPL-MCNC: 217 MG/DL — HIGH (ref 70–99)
HCT VFR BLD CALC: 32 % — LOW (ref 34.5–45)
HGB BLD-MCNC: 11.5 G/DL — SIGNIFICANT CHANGE UP (ref 11.5–15.5)
MCHC RBC-ENTMCNC: 27 PG — SIGNIFICANT CHANGE UP (ref 27–34)
MCHC RBC-ENTMCNC: 35.9 G/DL — SIGNIFICANT CHANGE UP (ref 32–36)
MCV RBC AUTO: 75.1 FL — LOW (ref 80–100)
NRBC # BLD: 0 /100 WBCS — SIGNIFICANT CHANGE UP (ref 0–0)
PLATELET # BLD AUTO: 260 K/UL — SIGNIFICANT CHANGE UP (ref 150–400)
POTASSIUM SERPL-MCNC: 4.1 MMOL/L — SIGNIFICANT CHANGE UP (ref 3.5–5.3)
POTASSIUM SERPL-SCNC: 4.1 MMOL/L — SIGNIFICANT CHANGE UP (ref 3.5–5.3)
RBC # BLD: 4.26 M/UL — SIGNIFICANT CHANGE UP (ref 3.8–5.2)
RBC # FLD: 14 % — SIGNIFICANT CHANGE UP (ref 10.3–14.5)
SODIUM SERPL-SCNC: 138 MMOL/L — SIGNIFICANT CHANGE UP (ref 135–145)
VANCOMYCIN TROUGH SERPL-MCNC: 19 UG/ML — SIGNIFICANT CHANGE UP (ref 10–20)
WBC # BLD: 9.79 K/UL — SIGNIFICANT CHANGE UP (ref 3.8–10.5)
WBC # FLD AUTO: 9.79 K/UL — SIGNIFICANT CHANGE UP (ref 3.8–10.5)

## 2024-12-15 PROCEDURE — 99233 SBSQ HOSP IP/OBS HIGH 50: CPT

## 2024-12-15 RX ORDER — INSULIN GLARGINE 100 [IU]/ML
34 INJECTION, SOLUTION SUBCUTANEOUS AT BEDTIME
Refills: 0 | Status: DISCONTINUED | OUTPATIENT
Start: 2024-12-15 | End: 2024-12-20

## 2024-12-15 RX ADMIN — Medication 7 UNIT(S): at 08:12

## 2024-12-15 RX ADMIN — Medication 250 MILLIGRAM(S): at 17:55

## 2024-12-15 RX ADMIN — Medication 4: at 12:02

## 2024-12-15 RX ADMIN — Medication 10 UNIT(S): at 12:03

## 2024-12-15 RX ADMIN — Medication 12 UNIT(S): at 17:03

## 2024-12-15 RX ADMIN — PIPERACILLIN SODIUM AND TAZOBACTAM SODIUM 25 GRAM(S): 4; .5 INJECTION, POWDER, LYOPHILIZED, FOR SOLUTION INTRAVENOUS at 14:18

## 2024-12-15 RX ADMIN — Medication 2: at 08:10

## 2024-12-15 RX ADMIN — Medication 250 MILLIGRAM(S): at 05:19

## 2024-12-15 RX ADMIN — PIPERACILLIN SODIUM AND TAZOBACTAM SODIUM 25 GRAM(S): 4; .5 INJECTION, POWDER, LYOPHILIZED, FOR SOLUTION INTRAVENOUS at 22:07

## 2024-12-15 RX ADMIN — PIPERACILLIN SODIUM AND TAZOBACTAM SODIUM 25 GRAM(S): 4; .5 INJECTION, POWDER, LYOPHILIZED, FOR SOLUTION INTRAVENOUS at 06:48

## 2024-12-15 NOTE — PROGRESS NOTE ADULT - SUBJECTIVE AND OBJECTIVE BOX
Vital Signs Last 24 Hrs  T(C): 36.9 (15 Dec 2024 13:25), Max: 37.1 (14 Dec 2024 21:05)  T(F): 98.4 (15 Dec 2024 13:25), Max: 98.8 (14 Dec 2024 21:05)  HR: 90 (15 Dec 2024 13:25) (84 - 90)  BP: 166/71 (15 Dec 2024 13:25) (150/61 - 167/70)  BP(mean): 92 (14 Dec 2024 21:05) (92 - 92)  RR: 18 (15 Dec 2024 13:25) (17 - 18)  SpO2: 96% (15 Dec 2024 13:25) (96% - 99%)    Parameters below as of 15 Dec 2024 13:25  Patient On (Oxygen Delivery Method): room air      CBC Full  -  ( 15 Dec 2024 06:12 )  WBC Count : 9.79 K/uL  RBC Count : 4.26 M/uL  Hemoglobin : 11.5 g/dL  Hematocrit : 32.0 %  Platelet Count - Automated : 260 K/uL  Mean Cell Volume : 75.1 fl  Mean Cell Hemoglobin : 27.0 pg  Mean Cell Hemoglobin Concentration : 35.9 g/dL  Auto Neutrophil # : x  Auto Lymphocyte # : x  Auto Monocyte # : x  Auto Eosinophil # : x  Auto Basophil # : x  Auto Neutrophil % : x  Auto Lymphocyte % : x  Auto Monocyte % : x  Auto Eosinophil % : x  Auto Basophil % : x    12-15    138  |  107  |  11  ----------------------------<  217[H]  4.1   |  27  |  0.72    Ca    8.6      15 Dec 2024 06:12

## 2024-12-15 NOTE — PROGRESS NOTE ADULT - SUBJECTIVE AND OBJECTIVE BOX
Podiatry Interval: Patient was seen sitting in her chair resting comfortably. No acute overnight events noted. Patient states she is still having tenderness around the wound upon palpation, but overall improved from when she was first admitted. Patient denies any malodor or drainage coming from her dressings. Patient denies any numbness, tingling, or burning. Patient denies calf tenderness bilaterally. She has been compliant with keeping her dressings clean, dry, and intact. Patient denies any other pedal complaints and no constitutional symptoms such as F/C/N/V/SOB. AAOx3, NAD.    Podiatry HPI: Podiatry consulted for 64-year-old female with chief complaint of left foot diabetic infection. Patient sent in by her podiatrist, Dr. Delarosa. States she saw Dr. Delarosa last week who noticed an ulceration on her left hallux. Patient states she was prescribed Doxycycline 100 mg BID and was instructed to apply Mupirocin ointment to the wound. She comments that she has taken 2 days worth of the Doxycycline.  Patient states the wound has worsened in appearance over the last week and was instructed by her podiatrist to come in to the ED. She also complains of longstanding redness and swelling to the left foot for over a month now. Patient complains of subjective fever and chills. Denies n/v.       Medications:  acetaminophen     Tablet .. 650 milliGRAM(s) Oral every 6 hours PRN  enoxaparin Injectable 40 milliGRAM(s) SubCutaneous every 24 hours  insulin glargine Injectable (LANTUS) 34 Unit(s) SubCutaneous at bedtime  insulin lispro (ADMELOG) corrective regimen sliding scale   SubCutaneous three times a day before meals  insulin lispro (ADMELOG) corrective regimen sliding scale   SubCutaneous at bedtime  insulin lispro Injectable (ADMELOG) 12 Unit(s) SubCutaneous three times a day before meals  piperacillin/tazobactam IVPB.. 3.375 Gram(s) IV Intermittent every 8 hours  vancomycin  IVPB 1000 milliGRAM(s) IV Intermittent every 12 hours  vancomycin  IVPB        FHx:  No pertinent family history of first degree relatives    PMHx:   DM (diabetes mellitus)  HTN (hypertension)  Cataract of both eyes, unspecified cataract type     PSHx:  Cataract of both eyes, unspecified cataract type  History of cholecystectomy      Labs                          11.5   9.79  )-----------( 260      ( 15 Dec 2024 06:12 )             32.0      12-15    138  |  107  |  11  ----------------------------<  217[H]  4.1   |  27  |  0.72    Ca    8.6      15 Dec 2024 06:12       Vital Signs Last 24 Hrs  T(C): 36.9 (15 Dec 2024 13:25), Max: 37.1 (14 Dec 2024 21:05)  T(F): 98.4 (15 Dec 2024 13:25), Max: 98.8 (14 Dec 2024 21:05)  HR: 90 (15 Dec 2024 13:25) (84 - 90)  BP: 166/71 (15 Dec 2024 13:25) (150/61 - 167/70)  BP(mean): 92 (14 Dec 2024 21:05) (92 - 92)  RR: 18 (15 Dec 2024 13:25) (17 - 18)  SpO2: 96% (15 Dec 2024 13:25) (96% - 99%)    Parameters below as of 15 Dec 2024 13:25  Patient On (Oxygen Delivery Method): room air    Sedimentation Rate, Erythrocyte: 62 mm/Hr (12-14-24 @ 04:15)  Sedimentation Rate, Erythrocyte: 67 mm/Hr (12-12-24 @ 19:34)         C-Reactive Protein: 48.3 mg/L (12-14-24 @ 04:15)  C-Reactive Protein: 121.0 mg/L (12-12-24 @ 15:35)  WBC Count: 9.79 K/uL (12-15-24 @ 06:12)      LE FOCUSED PHYSICAL EXAM:   Vasc: DP pulses palpable 1/4 bilaterally. TG within normal limits bilaterally with no more increase in temperature to the left hallux. Pedal hair present. Diffuse erythema and 1+ pitting edema to entire left foot and leg now significantly improved and localized to the left hallux.  Derm: Area of necrosis noted to distal tip of left hallux. Small wound that is now dry/stable, measuring 0.5 x 1.0 cm with fibrograunlar wound base and hyperkeratotic marcus-wound/overlay. No more serous drainage noted. Wound edges are no longer macerated. Tunneling noted medially about 0.7 cm. No fluctuance, no soft tissue crepitus, no malodor, no drainage, no purulence, no streaking.   Neuro: Protective sensation diminished bilaterally  MSK: Muscle strength deferred. Negative calf tenderness bilaterally. Mild tenderness upon palpation directly to the left hallux wound.       IMAGING:      ACC: [id number] EXAM:  XR FOOT 2 VIEWS LT   ORDERED BY: NICOLÁS REYES   ACC: [id number] EXAM:  XR CHEST PA LAT 2V   ORDERED BY: NICOLÁS REYES   PROCEDURE DATE:  12/12/2024    INTERPRETATION:  Toe pain.    AP chest. Prior 12/20/2016.    Low lung volumes. No change heart mediastinum. Grossly clear lungs.    IMPRESSION: Shallow inspiration. Grossly clear lungs.    Podiatry resident read: Midfoot charcot changes. No soft tissue emphysema. No cortical erosions of the hallux.     --- End of Report ---      ACC: [id number] EXAM:  MR FOOT LT   ORDERED BY: FARZANA SÁNCHEZ   PROCEDURE DATE:  12/13/2024    INTERPRETATION:  Exam Type: MR FOOT LEFT  Exam Date and Time: 12/13/2024 11:42 AM  Indication: Left hallux wound. Concern for osteomyelitis.  Comparison: Foot x-rays from one day prior.    TECHNIQUE:  Multiplanar multisequence MRI of the left ankle was performed.    FINDINGS:  There is a soft tissue wound about the tip of the great toe. Mild   surrounding inflammatory changes are present. There is no organized fluid   collection seen at this location. Deep to the wound, there is no low T1   signal identified within the distal phalanx of the great toe. Scattered   patchy high STIR signal is seen. There is no fracture or dislocation.   There are findings of early Charcot arthropathy at the midfoot. There is   no tenosynovitis. Fatty atrophy of the intrinsic forefoot musculature.   Dorsal foot subcutaneous tissue swelling.    IMPRESSION:  Soft tissue wound about the great toe with no low T1 signal and patchy   high STIR signal within the distal phalanx of the great toe deep to the   wound. Findings may reflect reactive osseous changes with early   osteomyelitis also a consideration within the differential diagnosis.    Early Charcot arthropathy at the midfoot.    --- End of Report ---

## 2024-12-15 NOTE — PROGRESS NOTE ADULT - SUBJECTIVE AND OBJECTIVE BOX
Interval Events:  pt in nad    Allergies    No Known Allergies    Intolerances      Endocrine/Metabolic Medications:  insulin glargine Injectable (LANTUS) 30 Unit(s) SubCutaneous at bedtime  insulin lispro (ADMELOG) corrective regimen sliding scale   SubCutaneous three times a day before meals  insulin lispro (ADMELOG) corrective regimen sliding scale   SubCutaneous at bedtime  insulin lispro Injectable (ADMELOG) 10 Unit(s) SubCutaneous three times a day before meals      Vital Signs Last 24 Hrs  T(C): 36.9 (15 Dec 2024 04:45), Max: 37.1 (14 Dec 2024 21:05)  T(F): 98.4 (15 Dec 2024 04:45), Max: 98.8 (14 Dec 2024 21:05)  HR: 84 (15 Dec 2024 04:45) (84 - 87)  BP: 150/61 (15 Dec 2024 04:45) (150/61 - 167/70)  BP(mean): 92 (14 Dec 2024 21:05) (92 - 92)  RR: 18 (15 Dec 2024 04:45) (17 - 18)  SpO2: 96% (15 Dec 2024 04:45) (96% - 99%)    Parameters below as of 15 Dec 2024 04:45  Patient On (Oxygen Delivery Method): room air          PHYSICAL EXAM  All physical exam findings normal, except those marked:  General:	Alert, active, cooperative, NAD, well hydrated  .		[] Abnormal:  Neck		Normal: supple, no cervical adenopathy, no palpable thyroid  .		[] Abnormal:  Cardiovascular	Normal: regular rate, normal S1, S2, no murmurs  .		[] Abnormal:  Respiratory	Normal: no chest wall deformity, normal respiratory pattern, CTA B/L  .		[] Abnormal:  Abdominal	Normal: soft, ND, NT, bowel sounds present, no masses, no organomegaly  .		[] Abnormal:  		Normal normal genitalia, testes descended, circumcised/uncircumcised  .		Michael stage:			Breast michael:  .		Menstrual history:  .		[] Abnormal:  Extremities	Normal: FROM x4  .		[] Abnormal:  Skin		Normal: intact and not indurated, no rash, no acanthosis nigricans  .		[] Abnormal:  Neurologic	Normal: grossly intact  .		[] Abnormal:    LABS                        11.5   9.79  )-----------( 260      ( 15 Dec 2024 06:12 )             32.0                               138    |  107    |  11                  Calcium: 8.6   / iCa: x      (12-15 @ 06:12)    ----------------------------<  217       Magnesium: x                                4.1     |  27     |  0.72             Phosphorous: x          CAPILLARY BLOOD GLUCOSE      POCT Blood Glucose.: 308 mg/dL (15 Dec 2024 11:29)  POCT Blood Glucose.: 211 mg/dL (15 Dec 2024 07:43)  POCT Blood Glucose.: 258 mg/dL (14 Dec 2024 21:29)  POCT Blood Glucose.: 177 mg/dL (14 Dec 2024 16:39)        Assesment/plan    Patient is a 64 year old female from home ambulates independently, with PMH of  T2DM (on insulin pump) who presented to the ED with a L hallux x 3-4d.   Endocrine consulted for dm management. Pt takes novolog via Cequer/ Simplicity without basal insulin. Chacks fsg 200-300s usually. Denies hypoglycemia. F/u with endo- Dr. Grace.         Problem/Recommendation - 1:  ·  Problem: DM (diabetes mellitus).   ·  Recommendation: uncontrolled with hyperglycemia  change lantus to 34 units  and admelog to 12 ac tid  fsg ac and hs  d/w pt need for basal/bolus insulin  nutrition eval.     Problem/Recommendation - 2:  ·  Problem: Diabetic foot ulcer.   ·  Recommendation: cont iv bax  tx per podiatry and ID.

## 2024-12-15 NOTE — PROGRESS NOTE ADULT - ASSESSMENT
A:   Left hallux distal phalanx - reactive osseous changes vs. early OM  Left Hallux diabetic ulcer - dry/stable  Charcot neuroarthropathy, left midfoot     P:  Patient evaluated, chart reviewed and updated  Vitals stable, leukocytosis absent. ESR/CRP downtrending.   Xrays evaluated - Evidence of Charcot changes noted to left midfoot. No gas, no fractures.   MRI of Left foot - No low T1 signal, but there is patchy high STIR signal within the distal phalanx, likely reactive osseous changes vs. early OM. Early charcot of midfoot.   Attending to further assess in person tomorrow, plan to be updated.   Discussed diagnosis and further treatment plans with the patient  Betadine DSD applied to the left foot.   Advised patient to keep dressings clean, dry, and intact at all times.   Patient states last A1C was 10%, but labs from 12/13/24 show A1c >15.5% - stressed the importance of proper glycemic control, daily foot examinations, and wearing proper shoe gear   Explained to the patient that she is to remain NWB to the left foot in order to prevent ulcerations and worsening of midfoot Charcot deformity  Patient inquired about surgical reconstructive options for charcot; however, it was discussed with patient that her A1c levels need to controlled, so that can be considered in the future outpatient if she remains compliant with taking care of her diabetes.   All questions and concerns addressed to the patient's satisfaction. Patient demonstrated verbal understanding.   Requesting PT consult prior to d/c so patient can be trained for NWB to the LLE with crutches or walker   Continue antibiotics per ID/IM  Podiatry to follow while in house  Discussed with attending Dr. Palma  A:   Left hallux distal phalanx - reactive osseous changes vs. early OM  Left Hallux diabetic ulcer - dry/stable  Charcot neuroarthropathy, left midfoot     P:  Patient evaluated, chart reviewed and updated  Vitals stable, leukocytosis absent. ESR/CRP downtrending.   Xrays evaluated - Evidence of Charcot changes noted to left midfoot. No gas, no fractures.   MRI of Left foot - No low T1 signal, but there is patchy high STIR signal within the distal phalanx, likely reactive osseous changes vs. early OM. Early charcot of midfoot.   Attending to further assess in person tomorrow, plan to be updated.   CHARMAINE/PVR pending  Discussed diagnosis and further treatment plans with the patient  Betadine DSD applied to the left foot.   Advised patient to keep dressings clean, dry, and intact at all times.   Patient states last A1C was 10%, but labs from 12/13/24 show A1c >15.5% - stressed the importance of proper glycemic control, daily foot examinations, and wearing proper shoe gear   Explained to the patient that she is to remain NWB to the left foot in order to prevent ulcerations and worsening of midfoot Charcot deformity  Patient inquired about surgical reconstructive options for charcot; however, it was discussed with patient that her A1c levels need to controlled, so that can be considered in the future outpatient if she remains compliant with taking care of her diabetes.   All questions and concerns addressed to the patient's satisfaction. Patient demonstrated verbal understanding.   Requesting PT consult prior to d/c so patient can be trained for NWB to the LLE with crutches or walker   Continue antibiotics per ID/IM  Podiatry to follow while in house  Discussed with attending Dr. Palma

## 2024-12-15 NOTE — PROGRESS NOTE ADULT - ASSESSMENT
Patient seen and examined at bedside this morning. Noted to have less pain in the foot today. She also says her redness around ankle has lessened. No fevers or chills noted.   Vitals, labs, imaging reviewed as above. Afebrile, vss, no leukocytosis, gluc 217  PE – nontoxic appearing female, NAD, NC/AT, RRR, lungs CTA b/l, abd soft ntnd, RLE wnl, LLE with great toe 2cm cut with small area behind the tip of toe, dressed with betadine, wrapped in ace wrap, L foot dorsum and ankle with warmth, erythema, decreasing in comparison    A/P  65yo F with pmh of uncontrolled IDDM who presents for worsening foot wound concerning for infection on L foot, admitted for diabetic foot infection c/f OM with overlying cellulitis.      #Left Foot great toe ulceration with overlying cellulitis   #R/o OM   #Diabetic foot infection  #Uncontrolled DM with hyperglycemia   #Charcot arthropathy    -trend inflammatory markers, trend cbc   -c/w vancomycin, zosyn   -ID consulted, f/u recs, likely not OM   -f/u bcx - ngtd  -podiatry consulted, f/u recs re MRI findings below  -MRI L foot noted with reactive osseous changes with early osteomyelitis also a consideration within the differential diagnosis. Early Charcot arthropathy at the midfoot.  -per podiatry, NWB to L foot   -noted a1c >15, endo consulted, appreciate recs from Dr Bhagat  -monitor FS/ISS, lantus increased to 34u for tonight (she is not on lantus at home), lispro 12u tid (takes sliding scale at home) per endo recs  -pain control prn   -nutrition eval  -dvt ppx

## 2024-12-16 DIAGNOSIS — N64.4 MASTODYNIA: ICD-10-CM

## 2024-12-16 LAB
ANION GAP SERPL CALC-SCNC: 7 MMOL/L — SIGNIFICANT CHANGE UP (ref 5–17)
BUN SERPL-MCNC: 11 MG/DL — SIGNIFICANT CHANGE UP (ref 7–18)
CALCIUM SERPL-MCNC: 8.6 MG/DL — SIGNIFICANT CHANGE UP (ref 8.4–10.5)
CHLORIDE SERPL-SCNC: 107 MMOL/L — SIGNIFICANT CHANGE UP (ref 96–108)
CO2 SERPL-SCNC: 25 MMOL/L — SIGNIFICANT CHANGE UP (ref 22–31)
CREAT SERPL-MCNC: 1.01 MG/DL — SIGNIFICANT CHANGE UP (ref 0.5–1.3)
EGFR: 63 ML/MIN/1.73M2 — SIGNIFICANT CHANGE UP
GLUCOSE BLDC GLUCOMTR-MCNC: 182 MG/DL — HIGH (ref 70–99)
GLUCOSE BLDC GLUCOMTR-MCNC: 190 MG/DL — HIGH (ref 70–99)
GLUCOSE BLDC GLUCOMTR-MCNC: 249 MG/DL — HIGH (ref 70–99)
GLUCOSE BLDC GLUCOMTR-MCNC: 337 MG/DL — HIGH (ref 70–99)
GLUCOSE SERPL-MCNC: 177 MG/DL — HIGH (ref 70–99)
HCT VFR BLD CALC: 31.7 % — LOW (ref 34.5–45)
HGB BLD-MCNC: 11.3 G/DL — LOW (ref 11.5–15.5)
MCHC RBC-ENTMCNC: 26.9 PG — LOW (ref 27–34)
MCHC RBC-ENTMCNC: 35.6 G/DL — SIGNIFICANT CHANGE UP (ref 32–36)
MCV RBC AUTO: 75.5 FL — LOW (ref 80–100)
NRBC # BLD: 0 /100 WBCS — SIGNIFICANT CHANGE UP (ref 0–0)
PLATELET # BLD AUTO: 286 K/UL — SIGNIFICANT CHANGE UP (ref 150–400)
POTASSIUM SERPL-MCNC: 4 MMOL/L — SIGNIFICANT CHANGE UP (ref 3.5–5.3)
POTASSIUM SERPL-SCNC: 4 MMOL/L — SIGNIFICANT CHANGE UP (ref 3.5–5.3)
RBC # BLD: 4.2 M/UL — SIGNIFICANT CHANGE UP (ref 3.8–5.2)
RBC # FLD: 14.2 % — SIGNIFICANT CHANGE UP (ref 10.3–14.5)
SODIUM SERPL-SCNC: 139 MMOL/L — SIGNIFICANT CHANGE UP (ref 135–145)
WBC # BLD: 10.34 K/UL — SIGNIFICANT CHANGE UP (ref 3.8–10.5)
WBC # FLD AUTO: 10.34 K/UL — SIGNIFICANT CHANGE UP (ref 3.8–10.5)

## 2024-12-16 PROCEDURE — 99233 SBSQ HOSP IP/OBS HIGH 50: CPT

## 2024-12-16 PROCEDURE — 99222 1ST HOSP IP/OBS MODERATE 55: CPT

## 2024-12-16 PROCEDURE — 93923 UPR/LXTR ART STDY 3+ LVLS: CPT | Mod: 26

## 2024-12-16 RX ADMIN — PIPERACILLIN SODIUM AND TAZOBACTAM SODIUM 25 GRAM(S): 4; .5 INJECTION, POWDER, LYOPHILIZED, FOR SOLUTION INTRAVENOUS at 06:40

## 2024-12-16 RX ADMIN — Medication 250 MILLIGRAM(S): at 05:04

## 2024-12-16 RX ADMIN — PIPERACILLIN SODIUM AND TAZOBACTAM SODIUM 25 GRAM(S): 4; .5 INJECTION, POWDER, LYOPHILIZED, FOR SOLUTION INTRAVENOUS at 14:44

## 2024-12-16 RX ADMIN — Medication 1: at 08:43

## 2024-12-16 RX ADMIN — Medication 250 MILLIGRAM(S): at 17:53

## 2024-12-16 RX ADMIN — Medication 12 UNIT(S): at 12:33

## 2024-12-16 RX ADMIN — Medication 12 UNIT(S): at 17:52

## 2024-12-16 RX ADMIN — Medication 12 UNIT(S): at 08:44

## 2024-12-16 RX ADMIN — INSULIN GLARGINE 34 UNIT(S): 100 INJECTION, SOLUTION SUBCUTANEOUS at 21:44

## 2024-12-16 RX ADMIN — Medication 2: at 17:52

## 2024-12-16 RX ADMIN — Medication 4: at 12:32

## 2024-12-16 NOTE — PROGRESS NOTE ADULT - ASSESSMENT
Left foot and leg Cellulitis  Doubt Left 1st toe Osteomyelitis  based on MRI / X- ray of foot ( I reviewed films) and the superficial nature of her wound  Leukocytosis - normalized      Plan -   ·	DC Zosyn   ·	Cont Vancomycin 1gm iv q12hrs for a few days more  ·	will likely not treat as Osteomyelitis as clinically it does not appear so. Left foot and leg Cellulitis - improving slowly  Doubt Left 1st toe Osteomyelitis  based on MRI / X- ray of foot ( I reviewed films) and the superficial nature of her wound  Leukocytosis - normalized      Plan -   ·	DC Zosyn   ·	Cont Vancomycin 1gm iv q12hrs for a few days more before we transition to po antibiotics  ·	will not treat as Osteomyelitis as clinically it does not appear so.

## 2024-12-16 NOTE — PROGRESS NOTE ADULT - SUBJECTIVE AND OBJECTIVE BOX
Podiatry Interval: Patient was seen sitting in her chair resting comfortably. No acute overnight events noted. Patient denies any malodor or drainage coming from her dressings. She has been compliant with keeping her dressings clean, dry, and intact. Patient denies any other pedal complaints and no constitutional symptoms such as F/C/N/V/SOB. AAOx3, NAD.    Podiatry HPI: Podiatry consulted for 64-year-old female with chief complaint of left foot diabetic infection. Patient sent in by her podiatrist, Dr. Delarosa. States she saw Dr. Delarosa last week who noticed an ulceration on her left hallux. Patient states she was prescribed Doxycycline 100 mg BID and was instructed to apply Mupirocin ointment to the wound. She comments that she has taken 2 days worth of the Doxycycline.  Patient states the wound has worsened in appearance over the last week and was instructed by her podiatrist to come in to the ED. She also complains of longstanding redness and swelling to the left foot for over a month now. Patient complains of subjective fever and chills. Denies n/v.     Medications acetaminophen     Tablet .. 650 milliGRAM(s) Oral every 6 hours PRN  enoxaparin Injectable 40 milliGRAM(s) SubCutaneous every 24 hours  insulin glargine Injectable (LANTUS) 34 Unit(s) SubCutaneous at bedtime  insulin lispro (ADMELOG) corrective regimen sliding scale   SubCutaneous three times a day before meals  insulin lispro (ADMELOG) corrective regimen sliding scale   SubCutaneous at bedtime  insulin lispro Injectable (ADMELOG) 12 Unit(s) SubCutaneous three times a day before meals  piperacillin/tazobactam IVPB.. 3.375 Gram(s) IV Intermittent every 8 hours  vancomycin  IVPB 1000 milliGRAM(s) IV Intermittent every 12 hours  vancomycin  IVPB        FH,   PMHDM (diabetes mellitus)    HTN (hypertension)    Cataract of both eyes, unspecified cataract type       PSHNo significant past surgical history    Cataract of both eyes, unspecified cataract type    History of cholecystectomy        Labs                          11.5   9.79  )-----------( 260      ( 15 Dec 2024 06:12 )             32.0      12-15    138  |  107  |  11  ----------------------------<  217[H]  4.1   |  27  |  0.72    Ca    8.6      15 Dec 2024 06:12       Vital Signs Last 24 Hrs  T(C): 36.9 (16 Dec 2024 05:05), Max: 36.9 (15 Dec 2024 13:25)  T(F): 98.5 (16 Dec 2024 05:05), Max: 98.5 (16 Dec 2024 05:05)  HR: 82 (16 Dec 2024 05:05) (82 - 90)  BP: 143/59 (16 Dec 2024 05:05) (143/59 - 166/71)  BP(mean): 79 (16 Dec 2024 05:05) (79 - 86)  RR: 17 (16 Dec 2024 05:05) (17 - 18)  SpO2: 96% (16 Dec 2024 05:05) (96% - 97%)    Parameters below as of 16 Dec 2024 05:05  Patient On (Oxygen Delivery Method): room air      Sedimentation Rate, Erythrocyte: 62 mm/Hr (12-14-24 @ 04:15)  Sedimentation Rate, Erythrocyte: 67 mm/Hr (12-12-24 @ 19:34)         C-Reactive Protein: 48.3 mg/L (12-14-24 @ 04:15)  C-Reactive Protein: 121.0 mg/L (12-12-24 @ 15:35)       LE FOCUSED PHYSICAL EXAM:   Vasc: DP pulses palpable 1/4 bilaterally. TG within normal limits bilaterally with no more increase in temperature to the left hallux. Pedal hair present. Diffuse erythema and 1+ pitting edema to entire left foot and leg now significantly improved and localized to the left hallux.  Derm: Area of necrosis noted to distal tip of left hallux. Small wound that is now dry/stable, measuring 0.5 x 1.0 cm with fibrograunlar wound base and hyperkeratotic marcus-wound/overlay. No more serous drainage noted. Wound edges are no longer macerated. Tunneling noted medially about 0.7 cm. No fluctuance, no soft tissue crepitus, no malodor, no drainage, no purulence, no streaking.   Neuro: Protective sensation diminished bilaterally  MSK: Muscle strength deferred. Negative calf tenderness bilaterally. Mild tenderness upon palpation directly to the left hallux wound.       IMAGING:      ACC: [id number] EXAM:  XR FOOT 2 VIEWS LT   ORDERED BY: NICOLÁS REYES   ACC: [id number] EXAM:  XR CHEST PA LAT 2V   ORDERED BY: NICOLÁS REYES   PROCEDURE DATE:  12/12/2024    INTERPRETATION:  Toe pain.    AP chest. Prior 12/20/2016.    Low lung volumes. No change heart mediastinum. Grossly clear lungs.    IMPRESSION: Shallow inspiration. Grossly clear lungs.    Podiatry resident read: Midfoot charcot changes. No soft tissue emphysema. No cortical erosions of the hallux.     --- End of Report ---      ACC: [id number] EXAM:  MR FOOT LT   ORDERED BY: FARZANA SÁNCHEZ   PROCEDURE DATE:  12/13/2024    INTERPRETATION:  Exam Type: MR FOOT LEFT  Exam Date and Time: 12/13/2024 11:42 AM  Indication: Left hallux wound. Concern for osteomyelitis.  Comparison: Foot x-rays from one day prior.    TECHNIQUE:  Multiplanar multisequence MRI of the left ankle was performed.    FINDINGS:  There is a soft tissue wound about the tip of the great toe. Mild   surrounding inflammatory changes are present. There is no organized fluid   collection seen at this location. Deep to the wound, there is no low T1   signal identified within the distal phalanx of the great toe. Scattered   patchy high STIR signal is seen. There is no fracture or dislocation.   There are findings of early Charcot arthropathy at the midfoot. There is   no tenosynovitis. Fatty atrophy of the intrinsic forefoot musculature.   Dorsal foot subcutaneous tissue swelling.    IMPRESSION:  Soft tissue wound about the great toe with no low T1 signal and patchy   high STIR signal within the distal phalanx of the great toe deep to the   wound. Findings may reflect reactive osseous changes with early   osteomyelitis also a consideration within the differential diagnosis.    Early Charcot arthropathy at the midfoot.    --- End of Report ---

## 2024-12-16 NOTE — CONSULT NOTE ADULT - SUBJECTIVE AND OBJECTIVE BOX
Patient is a 64y old  Female who presents with a chief complaint of L hallux injury (16 Dec 2024 14:41)    HPI: 64F w/ PMHx significant for DM presents for a toe infection, but today noticed painful and tender right breast at the 6 o'clock position. Pt reports she had similar symptoms that she first felt many years ago, was referred to get a mammogram, but she has not followed up since then. States it only started hurting again today. Denies any trauma to the area, denies drainage in the past.     REVIEW OF SYSTEMS:  Negative except stated above in HPI    PAST MEDICAL & SURGICAL HISTORY:  DM (diabetes mellitus)  Cataract of both eyes, unspecified cataract type  Cataract of both eyes, unspecified cataract type  History of cholecystectomy    MEDICATIONS  (STANDING):  enoxaparin Injectable 40 milliGRAM(s) SubCutaneous every 24 hours  insulin glargine Injectable (LANTUS) 34 Unit(s) SubCutaneous at bedtime  insulin lispro (ADMELOG) corrective regimen sliding scale   SubCutaneous three times a day before meals  insulin lispro (ADMELOG) corrective regimen sliding scale   SubCutaneous at bedtime  insulin lispro Injectable (ADMELOG) 12 Unit(s) SubCutaneous three times a day before meals  vancomycin  IVPB 1000 milliGRAM(s) IV Intermittent every 12 hours  vancomycin  IVPB        MEDICATIONS  (PRN):  acetaminophen     Tablet .. 650 milliGRAM(s) Oral every 6 hours PRN Temp greater or equal to 38C (100.4F), Mild Pain (1 - 3)    Allergies  No Known Allergies    Vital Signs Last 24 Hrs  T(C): 36.9 (16 Dec 2024 13:59), Max: 36.9 (16 Dec 2024 05:05)  T(F): 98.4 (16 Dec 2024 13:59), Max: 98.5 (16 Dec 2024 05:05)  HR: 92 (16 Dec 2024 13:59) (82 - 104)  BP: 149/61 (16 Dec 2024 13:59) (119/67 - 156/62)  BP(mean): 84 (16 Dec 2024 09:45) (79 - 100)  RR: 18 (16 Dec 2024 13:59) (17 - 18)  SpO2: 99% (16 Dec 2024 13:59) (96% - 99%)    Parameters below as of 16 Dec 2024 13:59  Patient On (Oxygen Delivery Method): room air    PHYSICAL EXAM:   General: Alert and oriented, not in acute distress  Resp: Breathing unlabored  Breast: right breast small area 1cm x2cm of ecchymosis and erythema that is tender to touch with a small punctum  : No Montgomery, no dysuria or hematuria  Extremities: No pedal edema    LABS:                        11.3   10.34 )-----------( 286      ( 16 Dec 2024 07:14 )             31.7              12-16    139  |  107  |  11  ----------------------------<  177[H]  4.0   |  25  |  1.01    Ca    8.6      16 Dec 2024 07:14                Urinalysis Basic - ( 16 Dec 2024 07:14 )    Color: x / Appearance: x / SG: x / pH: x  Gluc: 177 mg/dL / Ketone: x  / Bili: x / Urobili: x   Blood: x / Protein: x / Nitrite: x   Leuk Esterase: x / RBC: x / WBC x   Sq Epi: x / Non Sq Epi: x / Bacteria: x      RADIOLOGY & ADDITIONAL STUDIES:  pending

## 2024-12-16 NOTE — PHYSICAL THERAPY INITIAL EVALUATION ADULT - GENERAL OBSERVATIONS, REHAB EVAL
Pt. received sitting in chair in NAD; very pleasant and cooperative. Left foot dressing/ace wrap c/d/i.

## 2024-12-16 NOTE — PROGRESS NOTE ADULT - ASSESSMENT
A:   Left hallux distal phalanx - reactive osseous changes vs. early OM  Left Hallux diabetic ulcer - dry/stable  Charcot neuroarthropathy, left midfoot     P:  Patient evaluated chart reviewed    Vitals stable, leukocytosis absent, ESR/CRP elevated  L hallux diabetic ulcer dry and stable, no probe to bone, no purulence  MRI of L foot results reviewed - no OM of distal phalanx appreciated, early Charcot arthropathy to left midfoot   Discussed diagnosis and further treatment plans with the patient  Betadine DSD applied to the left foot.   Advised patient to keep dressings clean, dry, and intact at all times.   A1c >15.5% - stressed the importance of proper glycemic control, daily foot examinations, and wearing proper shoe gear   Explained to the patient that she is to remain NWB to the left foot in order to prevent ulcerations and worsening of midfoot Charcot deformity  Patient inquired about surgical reconstructive options for charcot; however, it was discussed with patient that her A1c levels need to controlled, so that can be considered in the future outpatient if she remains compliant with taking care of her diabetes.   All questions and concerns addressed to the patient's satisfaction. Patient demonstrated verbal understanding.   Requesting PT consult prior to d/c so patient can be trained for NWB to the LLE with crutches or walker   Continue antibiotics per ID/IM  Patient is stable from a podiatry standpoint  Seen and discussed with attending Dr. Palma     Upon discharge, patient to follow up in outpatient clinic within 1 week:   [facility]  [address] [place] [address]  P: [phone]    WCO: Betadine, 4x4 gauze, maru, ACE (M/W/)

## 2024-12-16 NOTE — PROGRESS NOTE ADULT - PROBLEM SELECTOR PLAN 4
-   Most likely due to hyponatremia  -   Sodium today 139  -   Monitor BMP daily   RESOLVED -   Patient uses  insulin lispro pump at home  -  Finger sticks AC and HS  -  Sliding scale as ordered  -  HgbA1c  15.5  -  Endocrine  Dr Bhagat following.     -  Continue with Lantus  34 units nightly   -  Continue with Lispro 12 units with meals.

## 2024-12-16 NOTE — PROGRESS NOTE ADULT - NS ATTEND AMEND GEN_ALL_CORE FT
Patient seen and examined at bedside this morning. Noted to have less pain in the foot today. She also says her redness around ankle has lessened. No fevers or chills noted. She also brings up concern about a painful lesion under the R breast, noted in over the past few days.   Vitals, labs, imaging reviewed as above. Afebrile, vss, no leukocytosis, gluc 177  PE – nontoxic appearing female, NAD, NC/AT, RRR, lungs CTA b/l, abd soft ntnd, RLE wnl, LLE with great toe 2cm cut with small area behind the tip of toe, dressed with betadine, wrapped in ace wrap, L foot dorsum and ankle with warmth, erythema, decreasing in comparison, 3cm region under the R breast with warmth, ttp and mild fluctuance    A/P  65yo F with pmh of uncontrolled IDDM who presents for worsening foot wound concerning for infection on L foot, admitted for diabetic foot infection c/f OM with overlying cellulitis.      #Left Foot great toe ulceration with overlying cellulitis   #R/o OM   #Diabetic foot infection  #Uncontrolled DM with hyperglycemia   #Charcot arthropathy  #R breast pustule vs area of abscess    -trend inflammatory markers, trend cbc   -c/w vancomycin, zosyn   -ID consulted, f/u recs, likely not OM   -f/u bcx - ngtd  -podiatry consulted, f/u recs re MRI findings below  -MRI L foot noted with reactive osseous changes with early osteomyelitis also a consideration within the differential diagnosis. Early Charcot arthropathy at the midfoot.  -per podiatry, NWB to L foot   -PT eval to assist with RW  -noted a1c >15, endo consulted, appreciate recs from Dr Bhagat  -monitor FS/ISS, lantus increased to 34u for tonight (she is not on lantus at home), lispro 12u tid (takes sliding scale at home) per endo recs  -surgery consulted for need for incision and drainage of R breast ?abscess  -pain control prn   -nutrition eval  -dvt ppx

## 2024-12-16 NOTE — PROGRESS NOTE ADULT - PROBLEM SELECTOR PLAN 5
-   DVT prophylaxis with Lovenox -   Most likely due to hyponatremia  -   Sodium today 139  -   Monitor BMP daily   RESOLVED

## 2024-12-16 NOTE — PROGRESS NOTE ADULT - SUBJECTIVE AND OBJECTIVE BOX
Interval Events:      Allergies    No Known Allergies    Intolerances      Endocrine/Metabolic Medications:  insulin glargine Injectable (LANTUS) 34 Unit(s) SubCutaneous at bedtime  insulin lispro (ADMELOG) corrective regimen sliding scale   SubCutaneous three times a day before meals  insulin lispro (ADMELOG) corrective regimen sliding scale   SubCutaneous at bedtime  insulin lispro Injectable (ADMELOG) 12 Unit(s) SubCutaneous three times a day before meals      Vital Signs Last 24 Hrs  T(C): 36.9 (16 Dec 2024 05:05), Max: 36.9 (15 Dec 2024 13:25)  T(F): 98.5 (16 Dec 2024 05:05), Max: 98.5 (16 Dec 2024 05:05)  HR: 82 (16 Dec 2024 05:05) (82 - 90)  BP: 143/59 (16 Dec 2024 05:05) (143/59 - 166/71)  BP(mean): 79 (16 Dec 2024 05:05) (79 - 86)  RR: 17 (16 Dec 2024 05:05) (17 - 18)  SpO2: 96% (16 Dec 2024 05:05) (96% - 97%)    Parameters below as of 16 Dec 2024 05:05  Patient On (Oxygen Delivery Method): room air          PHYSICAL EXAM  All physical exam findings normal, except those marked:  General:	Alert, active, cooperative, NAD, well hydrated  .		[] Abnormal:  Neck		Normal: supple, no cervical adenopathy, no palpable thyroid  .		[] Abnormal:  Cardiovascular	Normal: regular rate, normal S1, S2, no murmurs  .		[] Abnormal:  Respiratory	Normal: no chest wall deformity, normal respiratory pattern, CTA B/L  .		[] Abnormal:  Abdominal	Normal: soft, ND, NT, bowel sounds present, no masses, no organomegaly  .		[] Abnormal:  		Normal normal genitalia, testes descended, circumcised/uncircumcised  .		Michael stage:			Breast michael:  .		Menstrual history:  .		[] Abnormal:  Extremities	Normal: FROM x4  .		[] Abnormal:  Skin		Normal: intact and not indurated, no rash, no acanthosis nigricans  .		[] Abnormal:  Neurologic	Normal: grossly intact  .		[] Abnormal:    LABS                        11.3   10.34 )-----------( 286      ( 16 Dec 2024 07:14 )             31.7                               139    |  107    |  11                  Calcium: 8.6   / iCa: x      (12-16 @ 07:14)    ----------------------------<  177       Magnesium: x                                4.0     |  25     |  1.01             Phosphorous: x          CAPILLARY BLOOD GLUCOSE      POCT Blood Glucose.: 182 mg/dL (16 Dec 2024 08:11)  POCT Blood Glucose.: 125 mg/dL (15 Dec 2024 21:31)  POCT Blood Glucose.: 107 mg/dL (15 Dec 2024 16:43)  POCT Blood Glucose.: 308 mg/dL (15 Dec 2024 11:29)        Assesment/plan       Interval Events:  pt in nad    Allergies    No Known Allergies    Intolerances      Endocrine/Metabolic Medications:  insulin glargine Injectable (LANTUS) 34 Unit(s) SubCutaneous at bedtime  insulin lispro (ADMELOG) corrective regimen sliding scale   SubCutaneous three times a day before meals  insulin lispro (ADMELOG) corrective regimen sliding scale   SubCutaneous at bedtime  insulin lispro Injectable (ADMELOG) 12 Unit(s) SubCutaneous three times a day before meals      Vital Signs Last 24 Hrs  T(C): 36.9 (16 Dec 2024 05:05), Max: 36.9 (15 Dec 2024 13:25)  T(F): 98.5 (16 Dec 2024 05:05), Max: 98.5 (16 Dec 2024 05:05)  HR: 82 (16 Dec 2024 05:05) (82 - 90)  BP: 143/59 (16 Dec 2024 05:05) (143/59 - 166/71)  BP(mean): 79 (16 Dec 2024 05:05) (79 - 86)  RR: 17 (16 Dec 2024 05:05) (17 - 18)  SpO2: 96% (16 Dec 2024 05:05) (96% - 97%)    Parameters below as of 16 Dec 2024 05:05  Patient On (Oxygen Delivery Method): room air          PHYSICAL EXAM  All physical exam findings normal, except those marked:  General:	Alert, active, cooperative, NAD, well hydrated  .		[] Abnormal:  Neck		Normal: supple, no cervical adenopathy, no palpable thyroid  .		[] Abnormal:  Cardiovascular	Normal: regular rate, normal S1, S2, no murmurs  .		[] Abnormal:  Respiratory	Normal: no chest wall deformity, normal respiratory pattern, CTA B/L  .		[] Abnormal:  Abdominal	Normal: soft, ND, NT, bowel sounds present, no masses, no organomegaly  .		[] Abnormal:  		Normal normal genitalia, testes descended, circumcised/uncircumcised  .		Michael stage:			Breast michael:  .		Menstrual history:  .		[] Abnormal:  Extremities	Normal: FROM x4  .		[] Abnormal:  Skin		Normal: intact and not indurated, no rash, no acanthosis nigricans  .		[] Abnormal:  Neurologic	Normal: grossly intact  .		[] Abnormal:    LABS                        11.3   10.34 )-----------( 286      ( 16 Dec 2024 07:14 )             31.7                               139    |  107    |  11                  Calcium: 8.6   / iCa: x      (12-16 @ 07:14)    ----------------------------<  177       Magnesium: x                                4.0     |  25     |  1.01             Phosphorous: x          CAPILLARY BLOOD GLUCOSE      POCT Blood Glucose.: 182 mg/dL (16 Dec 2024 08:11)  POCT Blood Glucose.: 125 mg/dL (15 Dec 2024 21:31)  POCT Blood Glucose.: 107 mg/dL (15 Dec 2024 16:43)  POCT Blood Glucose.: 308 mg/dL (15 Dec 2024 11:29)        Assesment/plan    Patient is a 64 year old female from home ambulates independently, with PMH of  T2DM (on insulin pump) who presented to the ED with a L hallux x 3-4d.   Endocrine consulted for dm management. Pt takes novolog via Cequer/ Simplicity without basal insulin. Chacks fsg 200-300s usually. Denies hypoglycemia. F/u with endo- Dr. Grace.         Problem/Recommendation - 1:  ·  Problem: DM (diabetes mellitus).   ·  Recommendation: uncontrolled with hyperglycemia  cont lantus 34 units  and admelog to 12 ac tid  a1c->15%!  compliance d/w pt  fsg ac and hs  d/w pt need for basal/bolus insulin  nutrition eval.     Problem/Recommendation - 2:  ·  Problem: Diabetic foot ulcer.   ·  Recommendation: cont iv bax  tx per podiatry and ID.

## 2024-12-16 NOTE — PROGRESS NOTE ADULT - PROBLEM SELECTOR PLAN 1
-   MR left foot: Soft tissue wound about the great toe with no low T1 signal and patchy high STIR signal within the distal phalanx of the great toe deep to the wound. Findings may reflect reactive osseous changes with early   osteomyelitis also a consideration within the differential diagnosis.  Early Charcot arthropathy at the midfoot.  -   Podiatry following  -   ID:  Dr Mckenzie following  -   Continue with Vanco / Wilberto, f/u trough -   MR left foot: Soft tissue wound about the great toe with no low T1 signal and patchy high STIR signal within the distal phalanx of the great toe deep to the wound. Findings may reflect reactive osseous changes with early   osteomyelitis also a consideration within the differential diagnosis.  Early Charcot arthropathy at the midfoot.  -   Podiatry following  -   ID:  Dr Mckenzie following  -   Continue with Vancomycin 1 gram Q12 hours   -   Continue with Zosyn IV Q8 hours  -   CHARMAINE  /  PVR pending read

## 2024-12-16 NOTE — PROGRESS NOTE ADULT - PROBLEM SELECTOR PLAN 3
-   Patient uses  insulin lispro pump at home  -  Finger sticks AC and HS  -  Sliding scale as ordered  -  HgbA1c  15.5  -  Endocrine  Dr Bhagat following.     -  Continue with Lantus  34 units nightly   -  Continue with Lispro 12 units with meals. -  Patient with new c/o right breast pain  -  Noted to have right breast lesion  -  Ultrasound ordered to site  -  warm soaks to site  -  Surgery following.

## 2024-12-16 NOTE — PROGRESS NOTE ADULT - PROBLEM SELECTOR PLAN 6
-   Patient from home  -   Plan to return home pending clinical course -   DVT prophylaxis with Lovenox

## 2024-12-16 NOTE — PHYSICAL THERAPY INITIAL EVALUATION ADULT - LEVEL OF INDEPENDENCE, REHAB EVAL
Unable to assess as pt. was found sitting in chair but pt. reports that she is independent in all bed mobility activities

## 2024-12-16 NOTE — PHYSICAL THERAPY INITIAL EVALUATION ADULT - DIAGNOSIS, PT EVAL
Slight decline in functional mobility due to decreased balance and endurance as she is NWB on left LE.

## 2024-12-16 NOTE — PROGRESS NOTE ADULT - PROBLEM SELECTOR PLAN 2
-   Presented with  LLE wound, TTP, edema, erythema  -   WBC 12K, Lactate 1.6,   -   s/p doxycycline 2d and mupirocin at home, 1L in the ED  -   Start Zosyn and Vancomycin  -   Continue with  Tylenol PRN for pain, NWB to LLE  -   Blood cultures   -   Wound Cultures  -   Podiatry following   -   ID following  -   MR left foot above

## 2024-12-16 NOTE — PHYSICAL THERAPY INITIAL EVALUATION ADULT - NSPTDISCHREC_GEN_A_CORE
No skilled PT needs Pt. instructed in home exercise program and encouraged to perform them/No skilled PT needs

## 2024-12-16 NOTE — PROGRESS NOTE ADULT - SUBJECTIVE AND OBJECTIVE BOX
64y Female is under our care for     MEDS:  piperacillin/tazobactam IVPB.. 3.375 Gram(s) IV Intermittent every 8 hours  vancomycin  IVPB 1000 milliGRAM(s) IV Intermittent every 12 hours  vancomycin  IVPB        ALLERGIES: Allergies    No Known Allergies    Intolerances    REVIEW OF SYSTEMS:  [  ] Not able to elicit  General:	  Chest:	  GI:	  :  Skin:	  Musculoskeletal:	  Neuro:	    VITALS:  Vital Signs Last 24 Hrs  T(C): 36.9 (16 Dec 2024 13:59), Max: 36.9 (16 Dec 2024 05:05)  T(F): 98.4 (16 Dec 2024 13:59), Max: 98.5 (16 Dec 2024 05:05)  HR: 92 (16 Dec 2024 13:59) (82 - 104)  BP: 149/61 (16 Dec 2024 13:59) (119/67 - 156/62)  BP(mean): 84 (16 Dec 2024 09:45) (79 - 100)  RR: 18 (16 Dec 2024 13:59) (17 - 18)  SpO2: 99% (16 Dec 2024 13:59) (96% - 99%)    Parameters below as of 16 Dec 2024 13:59  Patient On (Oxygen Delivery Method): room air    PHYSICAL EXAM:  HEENT:  Neck:  Respiratory:  Cardiovascular:  Gastrointestinal:  :  Extremities:  Skin:  Ortho:  Neuro:    LABS/DIAGNOSTIC TESTS:                        11.3   10.34 )-----------( 286      ( 16 Dec 2024 07:14 )             31.7     WBC Count: 10.34 K/uL (12-16 @ 07:14)  WBC Count: 9.79 K/uL (12-15 @ 06:12)  WBC Count: 9.04 K/uL (12-14 @ 04:15)  WBC Count: 9.12 K/uL (12-13 @ 06:49)  WBC Count: 12.67 K/uL (12-12 @ 15:35)    12-16    139  |  107  |  11  ----------------------------<  177[H]  4.0   |  25  |  1.01    Ca    8.6      16 Dec 2024 07:14    CULTURES:   .Blood BLOOD  12-12 @ 15:40   No growth at 72 Hours  --  --    .Blood BLOOD  12-12 @ 15:35   No growth at 72 Hours  --  --    RADIOLOGY:  no new studies 64y Female sitting up in the recliner and in no apparent distress. Her left foot is still very edematous with diffuse erythema and mild warmth .  Denies any pain at this time.   Denies nausea, vomiting or diarrhea. No fevers or chills.     MEDS:  piperacillin/tazobactam IVPB.. 3.375 Gram(s) IV Intermittent every 8 hours  vancomycin  IVPB 1000 milliGRAM(s) IV Intermittent every 12 hours  vancomycin  IVPB        ALLERGIES: Allergies    No Known Allergies    Intolerances    REVIEW OF SYSTEMS:  [  ] Not able to elicit  General: no fevers no malaise  Chest: no cough no sob  GI: no nvd  : no urinary sxs   Skin: no rashes  Musculoskeletal: no trauma no LBP  Neuro: no ha's no dizziness 	    VITALS:  Vital Signs Last 24 Hrs  T(C): 36.9 (16 Dec 2024 13:59), Max: 36.9 (16 Dec 2024 05:05)  T(F): 98.4 (16 Dec 2024 13:59), Max: 98.5 (16 Dec 2024 05:05)  HR: 92 (16 Dec 2024 13:59) (82 - 104)  BP: 149/61 (16 Dec 2024 13:59) (119/67 - 156/62)  BP(mean): 84 (16 Dec 2024 09:45) (79 - 100)  RR: 18 (16 Dec 2024 13:59) (17 - 18)  SpO2: 99% (16 Dec 2024 13:59) (96% - 99%)    Parameters below as of 16 Dec 2024 13:59  Patient On (Oxygen Delivery Method): room air    PHYSICAL EXAM:  HEENT: normocephalic, conjunctivae and sclerae clear; moist mucous membranes  Neck: supple no LN's   Respiratory: lungs clear no rales  Cardiovascular: S1 S2 reg no murmurs  Gastrointestinal: +BS with soft, nondistended abdomen; nontender  Extremities: diffuse erythema and edema to the  left foot ; mild warmth to Left LE;  left first toe with superficial laceration and bruising  Skin: no rashes  Ortho: no erythema or joint swelling  Neuro: AAO x 3    LABS/DIAGNOSTIC TESTS:                        11.3   10.34 )-----------( 286      ( 16 Dec 2024 07:14 )             31.7     WBC Count: 10.34 K/uL (12-16 @ 07:14)  WBC Count: 9.79 K/uL (12-15 @ 06:12)  WBC Count: 9.04 K/uL (12-14 @ 04:15)  WBC Count: 9.12 K/uL (12-13 @ 06:49)  WBC Count: 12.67 K/uL (12-12 @ 15:35)    12-16    139  |  107  |  11  ----------------------------<  177[H]  4.0   |  25  |  1.01    Ca    8.6      16 Dec 2024 07:14    CULTURES:   .Blood BLOOD  12-12 @ 15:40   No growth at 72 Hours  --  --    .Blood BLOOD  12-12 @ 15:35   No growth at 72 Hours  --  --    RADIOLOGY:    < from: US Physiol Extremity Lower 3+ Level, BI (12.16.24 @ 12:40) >  ACC: [id number] EXAM:  US PHYSIOL LWR EXT 3+ LEV BI   ORDERED BY: EZEKIEL SALINAS     PROCEDURE DATE:  12/16/2024      INTERPRETATION:  History: Gangrenous left toe, diminished pedal pulses    Risk factors: Hyperlipidemia, diabetes    The blood pressure measurements at the right and left upper calfs, and at   the right and left ankles in both the posterior tibial and dorsal pedis   arteries are all in excess of 240 mmHg.    Artificially elevated blood pressure measurements are characteristic for   calcified, noncompressible, diabetic arteries. As such, meaningful   ankle-brachial indices cannot be obtained.    The blood pressure measurements were not obtained at the right and left   toes.    The amplitude of the pulse volume recordings bilaterally are normal or   near normal at the levels of the right and the left lower thighs, upper   calfs and ankles.    The amplitude of the pulse volume recordings are reduced at the level of   the right toes and more severely reduced at the levels of theleft   metatarsals and toes.    IMPRESSION:    The quality of this examination is adversely impacted by the   noncompressible nature of the calcified, noncompressible diabetic   arteries.    The reduced amplitude of the pulse volume recordings at theleft   metatarsals and toes is evidence for disease affecting the small arteries   of the left foot.    --- End of Report ---    KYLAH MATIAS MD; Attending Interventional Radiologist  This document has been electronically signed. Dec 16 2024  2:35PM    < end of copied text >   64y Female sitting up in the recliner and in no apparent distress. Her left foot is still very edematous with diffuse erythema and mild warmth ( improving but slowly) .  Denies any pain at this time.   Denies nausea, vomiting or diarrhea. No fevers or chills.     MEDS:  piperacillin/tazobactam IVPB.. 3.375 Gram(s) IV Intermittent every 8 hours  vancomycin  IVPB 1000 milliGRAM(s) IV Intermittent every 12 hours  vancomycin  IVPB        ALLERGIES: Allergies    No Known Allergies    Intolerances    REVIEW OF SYSTEMS:  [  ] Not able to elicit  General: no fevers no malaise  Chest: no cough no sob  GI: no nvd  : no urinary sxs   Skin: left foot redness  and warmth +  Musculoskeletal: left foot pain and swelling  Neuro: no ha's no dizziness 	    VITALS:  Vital Signs Last 24 Hrs  T(C): 36.9 (16 Dec 2024 13:59), Max: 36.9 (16 Dec 2024 05:05)  T(F): 98.4 (16 Dec 2024 13:59), Max: 98.5 (16 Dec 2024 05:05)  HR: 92 (16 Dec 2024 13:59) (82 - 104)  BP: 149/61 (16 Dec 2024 13:59) (119/67 - 156/62)  BP(mean): 84 (16 Dec 2024 09:45) (79 - 100)  RR: 18 (16 Dec 2024 13:59) (17 - 18)  SpO2: 99% (16 Dec 2024 13:59) (96% - 99%)    Parameters below as of 16 Dec 2024 13:59  Patient On (Oxygen Delivery Method): room air    PHYSICAL EXAM:  HEENT: normocephalic, conjunctivae and sclerae clear; moist mucous membranes  Neck: supple no LN's   Respiratory: lungs clear no rales  Cardiovascular: S1 S2 reg no murmurs  Gastrointestinal: +BS with soft, nondistended abdomen; nontender  Extremities: erythema and edema of left foot improving but slowly ; mild warmth of Left LE;  left first toe with superficial laceration and bruising  Skin: no rashes  Ortho: no erythema or joint swelling  Neuro: AAO x 3    LABS/DIAGNOSTIC TESTS:                        11.3   10.34 )-----------( 286      ( 16 Dec 2024 07:14 )             31.7     WBC Count: 10.34 K/uL (12-16 @ 07:14)  WBC Count: 9.79 K/uL (12-15 @ 06:12)  WBC Count: 9.04 K/uL (12-14 @ 04:15)  WBC Count: 9.12 K/uL (12-13 @ 06:49)  WBC Count: 12.67 K/uL (12-12 @ 15:35)    12-16    139  |  107  |  11  ----------------------------<  177[H]  4.0   |  25  |  1.01    Ca    8.6      16 Dec 2024 07:14    CULTURES:   .Blood BLOOD  12-12 @ 15:40   No growth at 72 Hours  --  --    .Blood BLOOD  12-12 @ 15:35   No growth at 72 Hours  --  --    RADIOLOGY:    < from: US Physiol Extremity Lower 3+ Level, BI (12.16.24 @ 12:40) >  ACC: [id number] EXAM:  US PHYSIOL LWR EXT 3+ LEV BI   ORDERED BY: EZEKIEL SALINAS     PROCEDURE DATE:  12/16/2024      INTERPRETATION:  History: Gangrenous left toe, diminished pedal pulses    Risk factors: Hyperlipidemia, diabetes    The blood pressure measurements at the right and left upper calfs, and at   the right and left ankles in both the posterior tibial and dorsal pedis   arteries are all in excess of 240 mmHg.    Artificially elevated blood pressure measurements are characteristic for   calcified, noncompressible, diabetic arteries. As such, meaningful   ankle-brachial indices cannot be obtained.    The blood pressure measurements were not obtained at the right and left   toes.    The amplitude of the pulse volume recordings bilaterally are normal or   near normal at the levels of the right and the left lower thighs, upper   calfs and ankles.    The amplitude of the pulse volume recordings are reduced at the level of   the right toes and more severely reduced at the levels of theleft   metatarsals and toes.    IMPRESSION:    The quality of this examination is adversely impacted by the   noncompressible nature of the calcified, noncompressible diabetic   arteries.    The reduced amplitude of the pulse volume recordings at theleft   metatarsals and toes is evidence for disease affecting the small arteries   of the left foot.    --- End of Report ---    KYLAH MATIAS MD; Attending Interventional Radiologist  This document has been electronically signed. Dec 16 2024  2:35PM    < end of copied text >

## 2024-12-16 NOTE — CONSULT NOTE ADULT - ASSESSMENT
right breast lesion at 6 o'clock    Recommend ultrasound of right breast  Continue warm compresses to the affected area  Discussed with breast surgeon Dr. Webster  Surgery will follow the ultrasound results  Pt may follow up with Dr. Webster upon discharge 63F with pmh DM with right breast lesion at 6 o'clock consistent with abscess.    Recommend ultrasound of right breast  Continue warm compresses to the affected area  Discussed with breast surgeon Dr. Webster  Surgery will follow the ultrasound results  Pt may follow up with Dr. Webster upon discharge

## 2024-12-16 NOTE — PROGRESS NOTE ADULT - SUBJECTIVE AND OBJECTIVE BOX
NP Note :      Patient is a 64y old  Female who presents with a chief complaint of L hallux injury (16 Dec 2024 09:42)      INTERVAL HPI / OVERNIGHT EVENTS: no new complaints    MEDICATIONS  (STANDING):  enoxaparin Injectable 40 milliGRAM(s) SubCutaneous every 24 hours  insulin glargine Injectable (LANTUS) 34 Unit(s) SubCutaneous at bedtime  insulin lispro (ADMELOG) corrective regimen sliding scale   SubCutaneous three times a day before meals  insulin lispro (ADMELOG) corrective regimen sliding scale   SubCutaneous at bedtime  insulin lispro Injectable (ADMELOG) 12 Unit(s) SubCutaneous three times a day before meals  piperacillin/tazobactam IVPB.. 3.375 Gram(s) IV Intermittent every 8 hours  vancomycin  IVPB 1000 milliGRAM(s) IV Intermittent every 12 hours  vancomycin  IVPB        MEDICATIONS  (PRN):  acetaminophen     Tablet .. 650 milliGRAM(s) Oral every 6 hours PRN Temp greater or equal to 38C (100.4F), Mild Pain (1 - 3)      __________________________________________________  REVIEW OF SYSTEMS:    CONSTITUTIONAL: No fever, chills.  EYES: no acute visual disturbances  NECK: No pain or stiffness  RESPIRATORY: No cough; No shortness of breath  CARDIOVASCULAR: No chest pain, no palpitations  GASTROINTESTINAL: No pain. No nausea or vomiting; No diarrhea   NEUROLOGICAL: No headache or numbness, no tremors  MUSCULOSKELETAL: No joint pain, no muscle pain  GENITOURINARY: no dysuria, no frequency, no hesitancy  PSYCHIATRY: no depression , no anxiety  ALL OTHER  ROS negative        Vital Signs Last 24 Hrs  T(C): 36.9 (16 Dec 2024 05:05), Max: 36.9 (15 Dec 2024 13:25)  T(F): 98.5 (16 Dec 2024 05:05), Max: 98.5 (16 Dec 2024 05:05)  HR: 104 (16 Dec 2024 09:45) (82 - 104)  BP: 119/67 (16 Dec 2024 09:45) (119/67 - 166/71)  BP(mean): 84 (16 Dec 2024 09:45) (79 - 100)  RR: 17 (16 Dec 2024 05:05) (17 - 18)  SpO2: 97% (16 Dec 2024 09:45) (96% - 97%)    Parameters below as of 16 Dec 2024 09:45  Patient On (Oxygen Delivery Method): room air        ________________________________________________  PHYSICAL EXAM:  GENERAL: No acute distress  HEENT: Normocephalic;  conjunctivae and sclerae clear; moist mucous membranes;   NECK : supple  CHEST/LUNG: Clear to auscultation bilaterally with good air entry   HEART: S1 S2  regular; no murmurs, gallops or rubs  ABDOMEN: Soft, Nontender, Nondistended; Bowel sounds present  EXTREMITIES: no cyanosis; no edema; no calf tenderness  SKIN:   +2 pitting edema to LLEwarm and dry; no rash  NERVOUS SYSTEM:  Awake and alert; Oriented  to place, person and time ; no new deficits    _________________________________________________  LABS:                        11.3   10.34 )-----------( 286      ( 16 Dec 2024 07:14 )             31.7     12-16    139  |  107  |  11  ----------------------------<  177[H]  4.0   |  25  |  1.01    Ca    8.6      16 Dec 2024 07:14        Urinalysis Basic - ( 16 Dec 2024 07:14 )    Color: x / Appearance: x / SG: x / pH: x  Gluc: 177 mg/dL / Ketone: x  / Bili: x / Urobili: x   Blood: x / Protein: x / Nitrite: x   Leuk Esterase: x / RBC: x / WBC x   Sq Epi: x / Non Sq Epi: x / Bacteria: x      CAPILLARY BLOOD GLUCOSE      POCT Blood Glucose.: 337 mg/dL (16 Dec 2024 12:17)  POCT Blood Glucose.: 182 mg/dL (16 Dec 2024 08:11)  POCT Blood Glucose.: 125 mg/dL (15 Dec 2024 21:31)  POCT Blood Glucose.: 107 mg/dL (15 Dec 2024 16:43)        RADIOLOGY & ADDITIONAL TESTS:    Imaging  Reviewed:  YES/NO    Consultant(s) Notes Reviewed:   YES/ No      Plan of care was discussed with patient and /or primary care giver; all questions and concerns were addressed  NP Note :      Patient is a 64y old  Female who presents with a chief complaint of L hallux injury (16 Dec 2024 09:42)      INTERVAL HPI / OVERNIGHT EVENTS: no new complaints    MEDICATIONS  (STANDING):  enoxaparin Injectable 40 milliGRAM(s) SubCutaneous every 24 hours  insulin glargine Injectable (LANTUS) 34 Unit(s) SubCutaneous at bedtime  insulin lispro (ADMELOG) corrective regimen sliding scale   SubCutaneous three times a day before meals  insulin lispro (ADMELOG) corrective regimen sliding scale   SubCutaneous at bedtime  insulin lispro Injectable (ADMELOG) 12 Unit(s) SubCutaneous three times a day before meals  piperacillin/tazobactam IVPB.. 3.375 Gram(s) IV Intermittent every 8 hours  vancomycin  IVPB 1000 milliGRAM(s) IV Intermittent every 12 hours  vancomycin  IVPB        MEDICATIONS  (PRN):  acetaminophen     Tablet .. 650 milliGRAM(s) Oral every 6 hours PRN Temp greater or equal to 38C (100.4F), Mild Pain (1 - 3)      __________________________________________________  REVIEW OF SYSTEMS:    CONSTITUTIONAL: No fever, chills.  EYES: no acute visual disturbances  NECK: No pain or stiffness  RESPIRATORY: No cough; No shortness of breath  CARDIOVASCULAR: No chest pain, no palpitations  GASTROINTESTINAL: No pain. No nausea or vomiting; No diarrhea   NEUROLOGICAL: No headache or numbness, no tremors  MUSCULOSKELETAL: No joint pain, no muscle pain  GENITOURINARY: no dysuria, no frequency, no hesitancy  PSYCHIATRY: no depression , no anxiety  ALL OTHER  ROS negative        Vital Signs Last 24 Hrs  T(C): 36.9 (16 Dec 2024 05:05), Max: 36.9 (15 Dec 2024 13:25)  T(F): 98.5 (16 Dec 2024 05:05), Max: 98.5 (16 Dec 2024 05:05)  HR: 104 (16 Dec 2024 09:45) (82 - 104)  BP: 119/67 (16 Dec 2024 09:45) (119/67 - 166/71)  BP(mean): 84 (16 Dec 2024 09:45) (79 - 100)  RR: 17 (16 Dec 2024 05:05) (17 - 18)  SpO2: 97% (16 Dec 2024 09:45) (96% - 97%)    Parameters below as of 16 Dec 2024 09:45  Patient On (Oxygen Delivery Method): room air        ________________________________________________  PHYSICAL EXAM:  GENERAL: No acute distress  HEENT: Normocephalic;  conjunctivae and sclerae clear; moist mucous membranes;   NECK : supple  CHEST/LUNG: Clear to auscultation bilaterally with good air entry   HEART: S1 S2  regular; no murmurs, gallops or rubs  ABDOMEN: Soft, Nontender, Nondistended; Bowel sounds present  EXTREMITIES: no cyanosis; no edema; no calf tenderness  SKIN:   +2 pitting edema to LLE.  warm and dry; no rash  NERVOUS SYSTEM:  Awake and alert; Oriented  to place, person and time ; no new deficits    _________________________________________________  LABS:                        11.3   10.34 )-----------( 286      ( 16 Dec 2024 07:14 )             31.7     12-16    139  |  107  |  11  ----------------------------<  177[H]  4.0   |  25  |  1.01    Ca    8.6      16 Dec 2024 07:14        Urinalysis Basic - ( 16 Dec 2024 07:14 )    Color: x / Appearance: x / SG: x / pH: x  Gluc: 177 mg/dL / Ketone: x  / Bili: x / Urobili: x   Blood: x / Protein: x / Nitrite: x   Leuk Esterase: x / RBC: x / WBC x   Sq Epi: x / Non Sq Epi: x / Bacteria: x      CAPILLARY BLOOD GLUCOSE  POCT Blood Glucose.: 337 mg/dL (16 Dec 2024 12:17)  POCT Blood Glucose.: 182 mg/dL (16 Dec 2024 08:11)  POCT Blood Glucose.: 125 mg/dL (15 Dec 2024 21:31)  POCT Blood Glucose.: 107 mg/dL (15 Dec 2024 16:43)        RADIOLOGY & ADDITIONAL TESTS:    Imaging  Reviewed:  YES/NO    Consultant(s) Notes Reviewed:   YES/ No      Plan of care was discussed with patient and /or primary care giver; all questions and concerns were addressed

## 2024-12-16 NOTE — PHYSICAL THERAPY INITIAL EVALUATION ADULT - ACTIVE RANGE OF MOTION EXAMINATION, REHAB EVAL
CARDIOLOGY PROGRESS NOTE    Date:  8/23/2022    Chief Complaint:  Abn arterial duplex    History of Present Illness:  68 year old female admitted 8/15/2022 with AMS, weakness.  Patient was noted to be disoriented and weak by her son. She lives alone.   In ED, vitals stable. UA +. CTOH negative. She was admitted.  Noted to have ulcers right LE. Arterial duplex from 11/2021 abnormal with f/u ABIs normal. Cardiology consulted.  Patient seen and examined. She denies chest pain, SOB. Has c/o pain intermittently RLE. Not necessarily related to activity. Noted ulcers to RLE developed in past few weeks    Patient has h/o NICMP, HF, afib s/p AVN ablation,  s/p BIV PPM 2017, HTN, HLD, obesity, BACILIO, Graves disease.    Subjective:  8/20: No new complaints. No leg/foot pain. No CP, SOB, palpitations, lightheadedness. No fever/chills.  8/22: no new complaints, would like to follow up at [facility]  8/23: stable, no acute overnight events    Histories, allergies and medications reviewed    Review of Systems:  10 systems reviewed and otherwise negative except for what is stated in past medical history and HPI (history of present illness).    Physical Exam:   Vitals:    08/23/22 0659   BP: 111/64   Pulse: 69   Resp: 18   Temp: 98 °F (36.7 °C)     General Appearance: Well developed, well nourished.   Neck: No JVD (jugular venous distension), no thyroid enlargement.  Respiratory: Lungs clear bilaterally, with good airway and normal expansion, no accessory muscle usage.  Cardiovascular: Regular rate and rhythm, normal S1 and S2, no S3 or murmur; No carotid bruits, pedal pulses non palpable, 1+ LE (lower extremity) edema, toes bilaterally cool and elo/purple appearing. Motor movement intact  Gastrointestinal: Abdomen is obese nontender, nondistended, positive bowel sounds,  Musculoskeletal: Bilateral LE (lower extremities): Normal strength, no joint tenderness.   Skin: Warm and dry; wounds RLE wrapped. Wound care pictures reviewed.  Compression stocking LLE. Chronic venous stais changes noted bilaterally  Neuro:  AA+Ox3. Moves all 4 extremities equally, follows directions and answers questions appropriately  Psych:  Normal mood and affect     Lab Results   Component Value Date    SODIUM 137 08/23/2022    POTASSIUM 3.8 08/23/2022    BUN 62 (H) 08/23/2022    CREATININE 2.32 (H) 08/23/2022    WBC 4.4 08/21/2022    HCT 38.0 08/21/2022    HGB 12.1 08/21/2022     08/21/2022    INR 2.5 08/23/2022    CPK 48 07/25/2022    MMB 1.3 03/09/2012    RAPDTR <0.02 01/10/2020    PTT 37 (H) 11/09/2021    GLUCOSE 81 08/23/2022    TSH 5.945 (H) 08/16/2022     (H) 11/15/2017    NTPROB 3,162 (H) 08/15/2022    CHOLESTEROL 116 11/10/2021    HDL 44 (L) 11/10/2021    CALCLDL 56 11/10/2021    TRIGLYCERIDE 82 11/10/2021    MG 2.6 (H) 07/26/2022    DIG 0.9 05/08/2017       Electrocardiogram:          Echo 8/18/2022  Normal left ventricular chamber size.  Moderately reduced left ventricular systolic function with ejection fraction of 35 -40 %. Hyperdynamic basal and mid inferior and lateral walls ,.  Flattening of the septum in diastole and systole consistent with right ventricular volume and pressure overload.  Severely enlarged right ventricular chamber size.  Severely reduced right ventricular systolic function.  Moderately elevated right ventricular systolic pressure 54 mmHg.  Mildly to Moderately increased left ventricular wall thickness.  Catheter/pacemaker wire visualized in the right ventricle.  Thickened tricuspid valve leaflets.  Severe tricuspid valve regurgitation.  Mildly dilated ascending aorta, 3.8 cm.  Dilated inferior vena cava with <50% collapse upon inspiration consistent with significantly elevated right atrial pressure, 15 mmHg.  No significant change since the prior study.    Arterial duplex 8/17/2022  1. Probable right peroneal artery occlusion.  2. Right mid tibial arteries and left mid anterior tibial arteries incompletely visualized  due to overlying bandages/body habitus. This limits evaluation for tibial arterial stenosis. Most distal waveforms are  multiphasic suggesting at least some good in line flow to the ankles.  3. No clear femoral-popliteal arterial stenosis.    Assessment and Plan:  · PVD with venous ulcer RLE. ABIs were normal, arterial US limited due to leg wounds cannot assess below knee vessels but no inflow or fempop significant disease. Cont wound cares, elevate legs when seated, optimize vol status.   · Chronic HFrEF. LVEF 35-40% (improved from 25-30% per echo 12/2021 at [facility]) Was supposed to have ischemic eval outpatient but this never occurred.  Declined life vest at that time per documentation. Continue metoprolol ER, no ACE/ARB/Entresto due to renal dysfunction.  · Pulmonary hypertension, RVSP 60 mmHg, stable. Optimize vol status, treat BACILIO.  · AFib s/p AVN ablation and BIV PPM. Followed by  OP. On warfarin goal INR 2.5  · Stable CAD s/p cath 2012 with diseased small OM otherwise normal coronaries. No chest pain  · HTN, controlled  · Morbid obesity  · BACILIO uses CPAP  · REGGIE on CKD stage 3. Creat 2.2-2.39-2.6-2.63 today. Unclear baseline creat 1.5-1.9 12/2021. Mgmt per hospitalist. bumex on hold. Nephrology following    Gely Goldberg PA-C/Dorene BANKS  8/23/2022  7:33 AM       Seen and examined.  No chest pain or shortness of breath.  Normal S1 and S2.  History, physical and plan were formulated by me - see above.     no Active ROM deficits were identified

## 2024-12-16 NOTE — PHYSICAL THERAPY INITIAL EVALUATION ADULT - PERTINENT HX OF CURRENT PROBLEM, REHAB EVAL
Pt. presented to the ED with a Left hallux injury x 3-4 days associated with erythema and chills, body aches. Pt. seen by podiatry. MRI of Left foot showed no OM of distal phalanx and early Charcot arthropathy to left midfoot.

## 2024-12-17 DIAGNOSIS — N61.1 ABSCESS OF THE BREAST AND NIPPLE: ICD-10-CM

## 2024-12-17 LAB
ANION GAP SERPL CALC-SCNC: 5 MMOL/L — SIGNIFICANT CHANGE UP (ref 5–17)
BUN SERPL-MCNC: 13 MG/DL — SIGNIFICANT CHANGE UP (ref 7–18)
CALCIUM SERPL-MCNC: 8.6 MG/DL — SIGNIFICANT CHANGE UP (ref 8.4–10.5)
CHLORIDE SERPL-SCNC: 110 MMOL/L — HIGH (ref 96–108)
CO2 SERPL-SCNC: 26 MMOL/L — SIGNIFICANT CHANGE UP (ref 22–31)
CREAT SERPL-MCNC: 1.15 MG/DL — SIGNIFICANT CHANGE UP (ref 0.5–1.3)
CULTURE RESULTS: SIGNIFICANT CHANGE UP
CULTURE RESULTS: SIGNIFICANT CHANGE UP
EGFR: 54 ML/MIN/1.73M2 — LOW
GLUCOSE BLDC GLUCOMTR-MCNC: 152 MG/DL — HIGH (ref 70–99)
GLUCOSE BLDC GLUCOMTR-MCNC: 174 MG/DL — HIGH (ref 70–99)
GLUCOSE BLDC GLUCOMTR-MCNC: 197 MG/DL — HIGH (ref 70–99)
GLUCOSE BLDC GLUCOMTR-MCNC: 215 MG/DL — HIGH (ref 70–99)
GLUCOSE BLDC GLUCOMTR-MCNC: 251 MG/DL — HIGH (ref 70–99)
GLUCOSE SERPL-MCNC: 168 MG/DL — HIGH (ref 70–99)
HCT VFR BLD CALC: 31 % — LOW (ref 34.5–45)
HGB BLD-MCNC: 11 G/DL — LOW (ref 11.5–15.5)
MCHC RBC-ENTMCNC: 26.3 PG — LOW (ref 27–34)
MCHC RBC-ENTMCNC: 35.5 G/DL — SIGNIFICANT CHANGE UP (ref 32–36)
MCV RBC AUTO: 74.2 FL — LOW (ref 80–100)
NRBC # BLD: 0 /100 WBCS — SIGNIFICANT CHANGE UP (ref 0–0)
PLATELET # BLD AUTO: 289 K/UL — SIGNIFICANT CHANGE UP (ref 150–400)
POTASSIUM SERPL-MCNC: 3.9 MMOL/L — SIGNIFICANT CHANGE UP (ref 3.5–5.3)
POTASSIUM SERPL-SCNC: 3.9 MMOL/L — SIGNIFICANT CHANGE UP (ref 3.5–5.3)
RBC # BLD: 4.18 M/UL — SIGNIFICANT CHANGE UP (ref 3.8–5.2)
RBC # FLD: 14.6 % — HIGH (ref 10.3–14.5)
SODIUM SERPL-SCNC: 141 MMOL/L — SIGNIFICANT CHANGE UP (ref 135–145)
SPECIMEN SOURCE: SIGNIFICANT CHANGE UP
SPECIMEN SOURCE: SIGNIFICANT CHANGE UP
VANCOMYCIN TROUGH SERPL-MCNC: 30.5 UG/ML — CRITICAL HIGH (ref 10–20)
WBC # BLD: 9.46 K/UL — SIGNIFICANT CHANGE UP (ref 3.8–10.5)
WBC # FLD AUTO: 9.46 K/UL — SIGNIFICANT CHANGE UP (ref 3.8–10.5)

## 2024-12-17 PROCEDURE — 76642 ULTRASOUND BREAST LIMITED: CPT | Mod: 26,RT

## 2024-12-17 PROCEDURE — 99233 SBSQ HOSP IP/OBS HIGH 50: CPT

## 2024-12-17 PROCEDURE — 10060 I&D ABSCESS SIMPLE/SINGLE: CPT

## 2024-12-17 RX ORDER — LIDOCAINE HCL 20 MG/ML
10 VIAL (ML) INJECTION ONCE
Refills: 0 | Status: COMPLETED | OUTPATIENT
Start: 2024-12-17 | End: 2024-12-17

## 2024-12-17 RX ADMIN — Medication 10 MILLILITER(S): at 20:57

## 2024-12-17 RX ADMIN — Medication 12 UNIT(S): at 08:36

## 2024-12-17 RX ADMIN — Medication 12 UNIT(S): at 13:13

## 2024-12-17 RX ADMIN — Medication 12 UNIT(S): at 17:07

## 2024-12-17 RX ADMIN — Medication 2: at 13:13

## 2024-12-17 RX ADMIN — Medication 1: at 17:07

## 2024-12-17 RX ADMIN — Medication 1: at 08:35

## 2024-12-17 RX ADMIN — INSULIN GLARGINE 34 UNIT(S): 100 INJECTION, SOLUTION SUBCUTANEOUS at 22:09

## 2024-12-17 NOTE — PROGRESS NOTE ADULT - SUBJECTIVE AND OBJECTIVE BOX
INTERVAL HPI/OVERNIGHT EVENTS:  Pt resting comfortably. No acute complaints. Tolerating diet. Denies severe breast pain, fever, chills. Pt states this has never happened in the past.     MEDICATIONS  (STANDING):  enoxaparin Injectable 40 milliGRAM(s) SubCutaneous every 24 hours  insulin glargine Injectable (LANTUS) 34 Unit(s) SubCutaneous at bedtime  insulin lispro (ADMELOG) corrective regimen sliding scale   SubCutaneous three times a day before meals  insulin lispro (ADMELOG) corrective regimen sliding scale   SubCutaneous at bedtime  insulin lispro Injectable (ADMELOG) 12 Unit(s) SubCutaneous three times a day before meals  vancomycin  IVPB 1000 milliGRAM(s) IV Intermittent every 12 hours  vancomycin  IVPB        MEDICATIONS  (PRN):  acetaminophen     Tablet .. 650 milliGRAM(s) Oral every 6 hours PRN Temp greater or equal to 38C (100.4F), Mild Pain (1 - 3)      Vital Signs Last 24 Hrs  T(C): 36 (17 Dec 2024 05:11), Max: 36.9 (16 Dec 2024 13:59)  T(F): 96.8 (17 Dec 2024 05:11), Max: 98.4 (16 Dec 2024 13:59)  HR: 87 (17 Dec 2024 05:11) (87 - 92)  BP: 145/65 (17 Dec 2024 05:11) (145/65 - 156/67)  BP(mean): 88 (16 Dec 2024 20:48) (88 - 88)  RR: 18 (17 Dec 2024 05:11) (18 - 18)  SpO2: 96% (17 Dec 2024 05:11) (96% - 99%)    Parameters below as of 17 Dec 2024 05:11  Patient On (Oxygen Delivery Method): room air        Physical:  General: A&Ox3. NAD.  Resp: Breathing unlabored  Abdomen: Soft nondistended, nontender.  Breast: right breast small area 1cm x2cm of ecchymosis and erythema that is tender to touch with a small punctum. No fluctuance appreciated.         I&O's Detail      LABS:                        11.0   9.46  )-----------( 289      ( 17 Dec 2024 04:15 )             31.0             12-17    141  |  110[H]  |  13  ----------------------------<  168[H]  3.9   |  26  |  1.15    Ca    8.6      17 Dec 2024 04:15

## 2024-12-17 NOTE — PROGRESS NOTE ADULT - SUBJECTIVE AND OBJECTIVE BOX
On instruction from surgical attending, area was cleaned with chlorhexadine.  Abcess aspirated with 18g needle under sterile conditions  1.5cc purulent material drained, pt tolerated well, no bleed.  c&s sent.  pt may raul jeong as UP with surgeon, abx per culture.

## 2024-12-17 NOTE — PROGRESS NOTE ADULT - PROBLEM SELECTOR PLAN 3
-  Patient with new c/o right breast pain  -  Noted to have right breast lesion  -  Ultrasound ordered to site  -  warm soaks to site  -  Surgery following. -  Patient with new c/o right breast pain  -  Noted to have right breast lesion  -  Ultrasound ordered to site  -  warm soaks to site  -  Surgery following.  12/17 US right breast is showing fluid collection; poss abscess; Sgy called by attdg for consult

## 2024-12-17 NOTE — PROGRESS NOTE ADULT - SUBJECTIVE AND OBJECTIVE BOX
Interval Events:      Allergies    No Known Allergies    Intolerances      Endocrine/Metabolic Medications:  insulin glargine Injectable (LANTUS) 34 Unit(s) SubCutaneous at bedtime  insulin lispro (ADMELOG) corrective regimen sliding scale   SubCutaneous three times a day before meals  insulin lispro (ADMELOG) corrective regimen sliding scale   SubCutaneous at bedtime  insulin lispro Injectable (ADMELOG) 12 Unit(s) SubCutaneous three times a day before meals      Vital Signs Last 24 Hrs  T(C): 36 (17 Dec 2024 05:11), Max: 36.9 (16 Dec 2024 13:59)  T(F): 96.8 (17 Dec 2024 05:11), Max: 98.4 (16 Dec 2024 13:59)  HR: 87 (17 Dec 2024 05:11) (87 - 92)  BP: 145/65 (17 Dec 2024 05:11) (145/65 - 156/67)  BP(mean): 88 (16 Dec 2024 20:48) (88 - 88)  RR: 18 (17 Dec 2024 05:11) (18 - 18)  SpO2: 96% (17 Dec 2024 05:11) (96% - 99%)    Parameters below as of 17 Dec 2024 05:11  Patient On (Oxygen Delivery Method): room air          PHYSICAL EXAM  All physical exam findings normal, except those marked:  General:	Alert, active, cooperative, NAD, well hydrated  .		[] Abnormal:  Neck		Normal: supple, no cervical adenopathy, no palpable thyroid  .		[] Abnormal:  Cardiovascular	Normal: regular rate, normal S1, S2, no murmurs  .		[] Abnormal:  Respiratory	Normal: no chest wall deformity, normal respiratory pattern, CTA B/L  .		[] Abnormal:  Abdominal	Normal: soft, ND, NT, bowel sounds present, no masses, no organomegaly  .		[] Abnormal:  		Normal normal genitalia, testes descended, circumcised/uncircumcised  .		Michael stage:			Breast michael:  .		Menstrual history:  .		[] Abnormal:  Extremities	Normal: FROM x4  .		[] Abnormal:  Skin		Normal: intact and not indurated, no rash, no acanthosis nigricans  .		[] Abnormal:  Neurologic	Normal: grossly intact  .		[] Abnormal:    LABS                        11.0   9.46  )-----------( 289      ( 17 Dec 2024 04:15 )             31.0                               141    |  110    |  13                  Calcium: 8.6   / iCa: x      (12-17 @ 04:15)    ----------------------------<  168       Magnesium: x                                3.9     |  26     |  1.15             Phosphorous: x          CAPILLARY BLOOD GLUCOSE      POCT Blood Glucose.: 197 mg/dL (17 Dec 2024 08:17)  POCT Blood Glucose.: 190 mg/dL (16 Dec 2024 21:26)  POCT Blood Glucose.: 249 mg/dL (16 Dec 2024 16:59)  POCT Blood Glucose.: 337 mg/dL (16 Dec 2024 12:17)        Assesment/plan       Interval Events:  pt in nad    Allergies    No Known Allergies    Intolerances      Endocrine/Metabolic Medications:  insulin glargine Injectable (LANTUS) 34 Unit(s) SubCutaneous at bedtime  insulin lispro (ADMELOG) corrective regimen sliding scale   SubCutaneous three times a day before meals  insulin lispro (ADMELOG) corrective regimen sliding scale   SubCutaneous at bedtime  insulin lispro Injectable (ADMELOG) 12 Unit(s) SubCutaneous three times a day before meals      Vital Signs Last 24 Hrs  T(C): 36 (17 Dec 2024 05:11), Max: 36.9 (16 Dec 2024 13:59)  T(F): 96.8 (17 Dec 2024 05:11), Max: 98.4 (16 Dec 2024 13:59)  HR: 87 (17 Dec 2024 05:11) (87 - 92)  BP: 145/65 (17 Dec 2024 05:11) (145/65 - 156/67)  BP(mean): 88 (16 Dec 2024 20:48) (88 - 88)  RR: 18 (17 Dec 2024 05:11) (18 - 18)  SpO2: 96% (17 Dec 2024 05:11) (96% - 99%)    Parameters below as of 17 Dec 2024 05:11  Patient On (Oxygen Delivery Method): room air          PHYSICAL EXAM  All physical exam findings normal, except those marked:  General:	Alert, active, cooperative, NAD, well hydrated  .		[] Abnormal:  Neck		Normal: supple, no cervical adenopathy, no palpable thyroid  .		[] Abnormal:  Cardiovascular	Normal: regular rate, normal S1, S2, no murmurs  .		[] Abnormal:  Respiratory	Normal: no chest wall deformity, normal respiratory pattern, CTA B/L  .		[] Abnormal:  Abdominal	Normal: soft, ND, NT, bowel sounds present, no masses, no organomegaly  .		[] Abnormal:  		Normal normal genitalia, testes descended, circumcised/uncircumcised  .		Michael stage:			Breast michael:  .		Menstrual history:  .		[] Abnormal:  Extremities	Normal: FROM x4  .		[] Abnormal:  Skin		Normal: intact and not indurated, no rash, no acanthosis nigricans  .		[] Abnormal:  Neurologic	Normal: grossly intact  .		[] Abnormal:    LABS                        11.0   9.46  )-----------( 289      ( 17 Dec 2024 04:15 )             31.0                               141    |  110    |  13                  Calcium: 8.6   / iCa: x      (12-17 @ 04:15)    ----------------------------<  168       Magnesium: x                                3.9     |  26     |  1.15             Phosphorous: x          CAPILLARY BLOOD GLUCOSE      POCT Blood Glucose.: 197 mg/dL (17 Dec 2024 08:17)  POCT Blood Glucose.: 190 mg/dL (16 Dec 2024 21:26)  POCT Blood Glucose.: 249 mg/dL (16 Dec 2024 16:59)  POCT Blood Glucose.: 337 mg/dL (16 Dec 2024 12:17)        Assesment/plan       Interval Events:  pt in nad    Allergies    No Known Allergies    Intolerances      Endocrine/Metabolic Medications:  insulin glargine Injectable (LANTUS) 34 Unit(s) SubCutaneous at bedtime  insulin lispro (ADMELOG) corrective regimen sliding scale   SubCutaneous three times a day before meals  insulin lispro (ADMELOG) corrective regimen sliding scale   SubCutaneous at bedtime  insulin lispro Injectable (ADMELOG) 12 Unit(s) SubCutaneous three times a day before meals      Vital Signs Last 24 Hrs  T(C): 36 (17 Dec 2024 05:11), Max: 36.9 (16 Dec 2024 13:59)  T(F): 96.8 (17 Dec 2024 05:11), Max: 98.4 (16 Dec 2024 13:59)  HR: 87 (17 Dec 2024 05:11) (87 - 92)  BP: 145/65 (17 Dec 2024 05:11) (145/65 - 156/67)  BP(mean): 88 (16 Dec 2024 20:48) (88 - 88)  RR: 18 (17 Dec 2024 05:11) (18 - 18)  SpO2: 96% (17 Dec 2024 05:11) (96% - 99%)    Parameters below as of 17 Dec 2024 05:11  Patient On (Oxygen Delivery Method): room air          PHYSICAL EXAM  All physical exam findings normal, except those marked:  General:	Alert, active, cooperative, NAD, well hydrated  .		[] Abnormal:  Neck		Normal: supple, no cervical adenopathy, no palpable thyroid  .		[] Abnormal:  Cardiovascular	Normal: regular rate, normal S1, S2, no murmurs  .		[] Abnormal:  Respiratory	Normal: no chest wall deformity, normal respiratory pattern, CTA B/L  .		[] Abnormal:  Abdominal	Normal: soft, ND, NT, bowel sounds present, no masses, no organomegaly  .		[] Abnormal:  		Normal normal genitalia, testes descended, circumcised/uncircumcised  .		Michael stage:			Breast michael:  .		Menstrual history:  .		[] Abnormal:  Extremities	Normal: FROM x4  .		[] Abnormal:  Skin		Normal: intact and not indurated, no rash, no acanthosis nigricans  .		[] Abnormal:  Neurologic	Normal: grossly intact  .		[] Abnormal:    LABS                        11.0   9.46  )-----------( 289      ( 17 Dec 2024 04:15 )             31.0                               141    |  110    |  13                  Calcium: 8.6   / iCa: x      (12-17 @ 04:15)    ----------------------------<  168       Magnesium: x                                3.9     |  26     |  1.15             Phosphorous: x          CAPILLARY BLOOD GLUCOSE      POCT Blood Glucose.: 197 mg/dL (17 Dec 2024 08:17)  POCT Blood Glucose.: 190 mg/dL (16 Dec 2024 21:26)  POCT Blood Glucose.: 249 mg/dL (16 Dec 2024 16:59)  POCT Blood Glucose.: 337 mg/dL (16 Dec 2024 12:17)        Assesment/plan    Patient is a 64 year old female from home ambulates independently, with PMH of  T2DM (on insulin pump) who presented to the ED with a L hallux x 3-4d.   Endocrine consulted for dm management. Pt takes novolog via Cequer/ Simplicity without basal insulin. Chacks fsg 200-300s usually. Denies hypoglycemia. F/u with endo- Dr. Grace.         Problem/Recommendation - 1:  ·  Problem: DM (diabetes mellitus).   ·  Recommendation: uncontrolled with hyperglycemia  cont lantus 34 units  and admelog to 12 ac tid  a1c->15%!  compliance d/w pt  fsg ac and hs  d/w pt need for basal/bolus insulin  nutrition eval.     Problem/Recommendation - 2:  ·  Problem: Diabetic foot ulcer.   ·  Recommendation: cont iv bax  tx per podiatry and ID.

## 2024-12-17 NOTE — PROGRESS NOTE ADULT - SUBJECTIVE AND OBJECTIVE BOX
NP Note discussed with  Primary Attending    Patient is a 64y old  Female who presents with a chief complaint of L hallux injury (17 Dec 2024 11:22)      INTERVAL HPI/OVERNIGHT EVENTS: no new complaints    MEDICATIONS  (STANDING):  enoxaparin Injectable 40 milliGRAM(s) SubCutaneous every 24 hours  insulin glargine Injectable (LANTUS) 34 Unit(s) SubCutaneous at bedtime  insulin lispro (ADMELOG) corrective regimen sliding scale   SubCutaneous three times a day before meals  insulin lispro (ADMELOG) corrective regimen sliding scale   SubCutaneous at bedtime  insulin lispro Injectable (ADMELOG) 12 Unit(s) SubCutaneous three times a day before meals  vancomycin  IVPB 1000 milliGRAM(s) IV Intermittent every 12 hours  vancomycin  IVPB        MEDICATIONS  (PRN):  acetaminophen     Tablet .. 650 milliGRAM(s) Oral every 6 hours PRN Temp greater or equal to 38C (100.4F), Mild Pain (1 - 3)      __________________________________________________  REVIEW OF SYSTEMS:    CONSTITUTIONAL: No fever,   EYES: no acute visual disturbances  NECK: No pain or stiffness  RESPIRATORY: No cough; No shortness of breath  CARDIOVASCULAR: No chest pain, no palpitations  GASTROINTESTINAL: No pain. No nausea or vomiting; No diarrhea   NEUROLOGICAL: No headache or numbness, no tremors  MUSCULOSKELETAL: No joint pain, no muscle pain  GENITOURINARY: no dysuria, no frequency, no hesitancy  PSYCHIATRY: no depression , no anxiety  ALL OTHER  ROS negative        Vital Signs Last 24 Hrs  T(C): 36.8 (17 Dec 2024 13:13), Max: 36.8 (16 Dec 2024 20:48)  T(F): 98.3 (17 Dec 2024 13:13), Max: 98.3 (16 Dec 2024 20:48)  HR: 88 (17 Dec 2024 13:13) (87 - 88)  BP: 152/79 (17 Dec 2024 13:13) (145/65 - 156/67)  BP(mean): 88 (16 Dec 2024 20:48) (88 - 88)  RR: 16 (17 Dec 2024 13:13) (16 - 18)  SpO2: 96% (17 Dec 2024 13:13) (96% - 98%)    Parameters below as of 17 Dec 2024 13:13  Patient On (Oxygen Delivery Method): room air        ________________________________________________  PHYSICAL EXAM:  GENERAL: NAD  HEENT: Normocephalic;  conjunctivae and sclerae clear; moist mucous membranes;   NECK : supple  CHEST/LUNG: Clear to auscultation bilaterally with good air entry   HEART: S1 S2  regular; no murmurs, gallops or rubs  ABDOMEN: Soft, Nontender, Nondistended; Bowel sounds present  EXTREMITIES: no cyanosis; no edema; no calf tenderness  SKIN: warm and dry; no rash  NERVOUS SYSTEM:  Awake and alert; Oriented  to place, person and time ; no new deficits    _________________________________________________  LABS:                        11.0   9.46  )-----------( 289      ( 17 Dec 2024 04:15 )             31.0     12-17    141  |  110[H]  |  13  ----------------------------<  168[H]  3.9   |  26  |  1.15    Ca    8.6      17 Dec 2024 04:15        Urinalysis Basic - ( 17 Dec 2024 04:15 )    Color: x / Appearance: x / SG: x / pH: x  Gluc: 168 mg/dL / Ketone: x  / Bili: x / Urobili: x   Blood: x / Protein: x / Nitrite: x   Leuk Esterase: x / RBC: x / WBC x   Sq Epi: x / Non Sq Epi: x / Bacteria: x      CAPILLARY BLOOD GLUCOSE      POCT Blood Glucose.: 215 mg/dL (17 Dec 2024 13:06)  POCT Blood Glucose.: 251 mg/dL (17 Dec 2024 11:31)  POCT Blood Glucose.: 197 mg/dL (17 Dec 2024 08:17)  POCT Blood Glucose.: 190 mg/dL (16 Dec 2024 21:26)  POCT Blood Glucose.: 249 mg/dL (16 Dec 2024 16:59)        RADIOLOGY & ADDITIONAL TESTS:    Imaging  Reviewed:  YES/NO    Consultant(s) Notes Reviewed:   YES/ No      Plan of care was discussed with patient and /or primary care giver; all questions and concerns were addressed  NP Note discussed with  Primary Attending    Patient is a 64y old  Female who presents with a chief complaint of L hallux injury (17 Dec 2024 11:22)      INTERVAL HPI/OVERNIGHT EVENTS: no new complaints    MEDICATIONS  (STANDING):  enoxaparin Injectable 40 milliGRAM(s) SubCutaneous every 24 hours  insulin glargine Injectable (LANTUS) 34 Unit(s) SubCutaneous at bedtime  insulin lispro (ADMELOG) corrective regimen sliding scale   SubCutaneous three times a day before meals  insulin lispro (ADMELOG) corrective regimen sliding scale   SubCutaneous at bedtime  insulin lispro Injectable (ADMELOG) 12 Unit(s) SubCutaneous three times a day before meals  vancomycin  IVPB 1000 milliGRAM(s) IV Intermittent every 12 hours  vancomycin  IVPB        MEDICATIONS  (PRN):  acetaminophen     Tablet .. 650 milliGRAM(s) Oral every 6 hours PRN Temp greater or equal to 38C (100.4F), Mild Pain (1 - 3)      __________________________________________________  REVIEW OF SYSTEMS:    CONSTITUTIONAL: No fever,   EYES: no acute visual disturbances  NECK: No pain or stiffness  RESPIRATORY: No cough; No shortness of breath  CARDIOVASCULAR: No chest pain, no palpitations  GASTROINTESTINAL: No pain. No nausea or vomiting; No diarrhea   NEUROLOGICAL: No headache or numbness, no tremors  MUSCULOSKELETAL: No joint pain, no muscle pain  GENITOURINARY: no dysuria, no frequency, no hesitancy  PSYCHIATRY: no depression , no anxiety  ALL OTHER  ROS negative        Vital Signs Last 24 Hrs  T(C): 36.8 (17 Dec 2024 13:13), Max: 36.8 (16 Dec 2024 20:48)  T(F): 98.3 (17 Dec 2024 13:13), Max: 98.3 (16 Dec 2024 20:48)  HR: 88 (17 Dec 2024 13:13) (87 - 88)  BP: 152/79 (17 Dec 2024 13:13) (145/65 - 156/67)  BP(mean): 88 (16 Dec 2024 20:48) (88 - 88)  RR: 16 (17 Dec 2024 13:13) (16 - 18)  SpO2: 96% (17 Dec 2024 13:13) (96% - 98%)    Parameters below as of 17 Dec 2024 13:13  Patient On (Oxygen Delivery Method): room air        ________________________________________________  PHYSICAL EXAM:  GENERAL: NAD  HEENT: Normocephalic;  conjunctivae and sclerae clear; moist mucous membranes;   NECK : supple  CHEST/LUNG: Clear to auscultation bilaterally with good air entry   HEART: S1 S2  regular; no murmurs, gallops or rubs  ABDOMEN: Soft, Nontender, Nondistended; Bowel sounds present  EXTREMITIES: no cyanosis; no edema; no calf tenderness  SKIN: warm and dry; no rash  NERVOUS SYSTEM:  Awake and alert; Oriented  to place, person and time ; no new deficits    _________________________________________________  LABS:                        11.0   9.46  )-----------( 289      ( 17 Dec 2024 04:15 )             31.0     12-17    141  |  110[H]  |  13  ----------------------------<  168[H]  3.9   |  26  |  1.15    Ca    8.6      17 Dec 2024 04:15        Urinalysis Basic - ( 17 Dec 2024 04:15 )    Color: x / Appearance: x / SG: x / pH: x  Gluc: 168 mg/dL / Ketone: x  / Bili: x / Urobili: x   Blood: x / Protein: x / Nitrite: x   Leuk Esterase: x / RBC: x / WBC x   Sq Epi: x / Non Sq Epi: x / Bacteria: x      CAPILLARY BLOOD GLUCOSE      POCT Blood Glucose.: 215 mg/dL (17 Dec 2024 13:06)  POCT Blood Glucose.: 251 mg/dL (17 Dec 2024 11:31)  POCT Blood Glucose.: 197 mg/dL (17 Dec 2024 08:17)  POCT Blood Glucose.: 190 mg/dL (16 Dec 2024 21:26)  POCT Blood Glucose.: 249 mg/dL (16 Dec 2024 16:59)      RADIOLOGY & ADDITIONAL TESTS:  < from: Xray Foot AP + Lateral, Left (12.12.24 @ 17:18) >  PROCEDURE DATE:  12/12/2024      INTERPRETATION:  Toe pain.    AP chest. Prior 12/20/2016.    Low lung volumes. No change heart mediastinum. Grossly clear lungs.    IMPRESSION: Shallow inspiration. Grossly clear lungs.    --- End of Report ---    < from: MR Foot No Cont, Left (12.13.24 @ 11:42) >  PROCEDURE DATE:  12/13/2024          INTERPRETATION:  Exam Type: MR FOOT LEFT  Exam Date and Time: 12/13/2024 11:42 AM  Indication: Left hallux wound. Concern for osteomyelitis.  Comparison: Foot x-rays from one day prior.    TECHNIQUE:  Multiplanar multisequence MRI of the left ankle was performed.    FINDINGS:  There is a soft tissue wound about the tip of the great toe. Mild   surrounding inflammatory changes are present. There is no organized fluid   collection seen at this location. Deep to the wound, there is no low T1   signal identified within the distal phalanx of the great toe. Scattered   patchy high STIR signal is seen. There is no fracture or dislocation.   There are findings of early Charcot arthropathy at the midfoot. There is   no tenosynovitis. Fatty atrophy of the intrinsic forefoot musculature.   Dorsal foot subcutaneous tissue swelling.      IMPRESSION:  Soft tissue wound about the great toe with no low T1 signal and patchy   high STIR signal within the distal phalanx of the great toe deep to the   wound. Findings may reflect reactive osseous changes with early   osteomyelitis also a consideration within the differential diagnosis.    Early Charcot arthropathy at the midfoot.    --- End of Report ---      < from: US Physiol Extremity Lower 3+ Level, BI (12.16.24 @ 12:40) >  INTERPRETATION:  History: Gangrenous left toe, diminished pedal pulses    Risk factors: Hyperlipidemia, diabetes    The blood pressure measurements at the right and left upper calfs, and at   the right and left ankles in both the posterior tibial and dorsal pedis   arteries are all in excess of 240 mmHg.    Artificially elevated blood pressure measurements are characteristic for   calcified, noncompressible, diabetic arteries. As such, meaningful   ankle-brachial indices cannot be obtained.    The blood pressure measurements were not obtained at the right and left   toes.    The amplitude of the pulse volume recordings bilaterally are normal or   near normal at the levels of the right and the left lower thighs, upper   calfs and ankles.    The amplitude of the pulse volume recordings are reduced at the level of   the right toes and more severely reduced at the levels of theleft   metatarsals and toes.    IMPRESSION:    The quality of this examination is adversely impacted by the   noncompressible nature of the calcified, noncompressible diabetic   arteries.    The reduced amplitude of the pulse volume recordings at theleft   metatarsals and toes is evidence for disease affecting the small arteries   of the left foot.      < from: US Breast Limited, Right (12.17.24 @ 12:41) >  PROCEDURE DATE:  12/17/2024          INTERPRETATION:  CLINICAL INFORMATION: Mastitis. Rule out abscess.    TECHNIQUE: Limited sonographic evaluation of the RIGHT breast was   performed targeted to the area of pain in the 6:00 axis, 6 cm FN.   Evaluation was performed by a technologist. This study was performed   exclusively for the purpose of excluding the presence of abscess in the   clinical setting of mastitis.    FINDINGS:    A complex collection is identified within the subcutaneous soft tissues   measuring 2.6 x 1.1 x 2.6 cm. There is evidence of peripheral flow with   color Doppler. It may represent an abscess.    IMPRESSION:  Evidence of a complex collection within the subcutaneous soft tissues as   described above.    --- End of Report ---      Imaging  Reviewed:  YES    Consultant(s) Notes Reviewed:   YES    Plan of care was discussed with patient and /or primary care giver; all questions and concerns were addressed

## 2024-12-17 NOTE — PROGRESS NOTE ADULT - PROBLEM SELECTOR PLAN 4
-   Patient uses  insulin lispro pump at home  -  Finger sticks AC and HS  -  Sliding scale as ordered  -  HgbA1c  15.5  -  Endocrine  Dr Bhagat following.     -  Continue with Lantus  34 units nightly   -  Continue with Lispro 12 units with meals.

## 2024-12-17 NOTE — CHART NOTE - NSCHARTNOTEFT_GEN_A_CORE
Breast Surgery Chart Note    - Reviewed Right Breast US results which demonstrated complex collection measuring 2.6 x 1.1 x 2.6 cm. Bedside aspiration was attempted with minimal fluid aspirated (1.5 cc) and sent for culture. On exam, patient continued to have fluctuance post-aspiration and therefore decision was made to proceed with bedside I&D. Risks of procedure were explained to patient and she elected to proceed (see Procedure Note).    Plan:  - Packing can be removed on Thursday 12/19.  - Will await culture results  - Antibiotics per ID  - Will need breast imaging in outpatient setting  - Upon discharge, please call office to schedule appointment    Master Webster MD  Breast Surgery Attending

## 2024-12-17 NOTE — PROGRESS NOTE ADULT - PROBLEM SELECTOR PLAN 1
-   MR left foot: Soft tissue wound about the great toe with no low T1 signal and patchy high STIR signal within the distal phalanx of the great toe deep to the wound. Findings may reflect reactive osseous changes with early   osteomyelitis also a consideration within the differential diagnosis.  Early Charcot arthropathy at the midfoot.  -   Podiatry following  -   ID:  Dr Mckenzie following  -   Continue with Vancomycin 1 gram Q12 hours   -   Continue with Zosyn IV Q8 hours  -   CHARMAINE  /  PVR pending read -   MR left foot: Soft tissue wound about the great toe with no low T1 signal and patchy high STIR signal within the distal phalanx of the great toe deep to the wound. Findings may reflect reactive osseous changes with early   osteomyelitis also a consideration within the differential diagnosis.  Early Charcot arthropathy at the midfoot.  -   Podiatry following  -   ID:  Dr Mckenzie following  -   Continue with Vancomycin 1 gram Q12 hours; Held today for Trough of 30--> rpt in am  -   Continue with Zosyn dc'd by ID 2/2 clinically don't appear to be OM  -   CHARMAINE/PVR -  + evidence for disease affecting the small arteries of the left foot.

## 2024-12-17 NOTE — PROGRESS NOTE ADULT - PROBLEM SELECTOR PLAN 2
-   Presented with  LLE wound, TTP, edema, erythema  -   WBC 12K, Lactate 1.6,   -   s/p doxycycline 2d and mupirocin at home, 1L in the ED  -   Start Zosyn and Vancomycin  -   Continue with  Tylenol PRN for pain, NWB to LLE  -   Blood cultures   -   Wound Cultures  -   Podiatry following   -   ID following  -   MR left foot above -   Presented with  LLE wound, TTP, edema, erythema  -   WBC 12K, Lactate 1.6,   -   s/p doxycycline 2d and mupirocin at home, 1L in the ED  -   Start Zosyn and Vancomycin  -   Continue with  Tylenol PRN for pain, NWB to LLE  -   Blood cultures   -   Wound Cultures  -   Podiatry following   -   ID following  -  12/17 MR left foot shows early OM  - ID dc'd Zosyn as noted above

## 2024-12-17 NOTE — PROGRESS NOTE ADULT - ASSESSMENT
64F with right breast lesion at 6 o'clock, tender   VSS, afebrile, no leukocytosis    Plan   - f/u breast US  - Continue warm compresses to the affected area  - continue abx  - Pt may follow up with Dr. Webster upon discharge

## 2024-12-17 NOTE — PROGRESS NOTE ADULT - NS ATTEND AMEND GEN_ALL_CORE FT
63 yo F with PMHx of uncontrolled IDDM p/w for worsening foot wound concerning for infection on L foot, admitted for diabetic foot infection c/f OM with overlying cellulitis course c/b R breast tenderness, swelling now pending work up.      This AM patient reporting no new issues, having some R breast pain intermittently and awaiting for ultrasound.      Exam:  Nontoxic appearing female, NAD   RRR   Lungs CTA b/l R breast redness mild tenderness on palpation    Abdo soft NTD   RLE wnl   LLE with great toe 2cm cut with small area behind the tip of toe, dressed with betadine, wrapped in ace wrap, L foot dorsum and ankle with warmth, erythema, decreasing in comparison, 3cm region under the R breast with warmth, ttp and mild fluctuance     Vitals, labs, imaging reviewed. Afebrile, VSS, no leukocytosis, gluc 251                         11.0   9.46  )-----------( 289      ( 17 Dec 2024 04:15 )             31.0   141  |  110[H]  |  13  ----------------------------( 168[H]     12-17 @ 04:15  3.9   |  26  |  1.15    Ca: 8.6   Phos: x    Mg: x   TPro: x  / Alb: x  /  TBili x  / DBili x  /  AST x  /  ALT x  /  AlkPhos  x       A/P:   #Left Foot great toe ulceration with overlying cellulitis    #R/o OM    #Diabetic foot infection   #Uncontrolled DM with hyperglycemia    #Charcot arthropathy   #R breast pustule vs area of abscess     -trend inflammatory markers, trend cbc, wbc 9 today     -c/w vancomycin, discontinued zosyn 12/16   -ID consulted, f/u recs, likely not OM    -f/u BCx- NGTD   -podiatry following, appreciate recs, NWB to L foot    -MRI L foot noted with reactive osseous changes with early osteomyelitis also a consideration within the differential diagnosis. Early Charcot arthropathy at the midfoot.   -noted a1c >15, endo consulted, appreciate recs from Dr Bhagat   -surgery consulted for need for incision and drainage of R breast ?abscess   -pain control prn    -nutrition eval   -dvt ppx 63 yo F with PMHx of uncontrolled IDDM p/w for worsening foot wound concerning for infection on L foot, admitted for diabetic foot infection c/f OM with overlying cellulitis course c/b R breast tenderness, swelling now pending work up.      This AM patient reporting no new issues, having some R breast pain intermittently and awaiting for ultrasound.      Exam:  Nontoxic appearing female, NAD   RRR   Lungs CTA b/l R breast redness mild tenderness on palpation    Abdo soft NTD   RLE wnl   LLE with great toe 2cm cut with small area behind the tip of toe, dressed with betadine, wrapped in ace wrap, L foot dorsum and ankle with warmth, erythema, decreasing in comparison, 3cm region under the R breast with warmth, ttp and mild fluctuance     Vitals, labs, imaging reviewed. Afebrile, VSS, no leukocytosis, gluc 251                         11.0   9.46  )-----------( 289      ( 17 Dec 2024 04:15 )             31.0   141  |  110[H]  |  13  ----------------------------( 168[H]     12-17 @ 04:15  3.9   |  26  |  1.15    Ca: 8.6   Phos: x    Mg: x   TPro: x  / Alb: x  /  TBili x  / DBili x  /  AST x  /  ALT x  /  AlkPhos  x   BREAST US:  Showing complex collection is identified within the subcutaneous soft tissues   measuring 2.6 x 1.1 x 2.6 cm.      A/P:   #Left Foot great toe ulceration with overlying cellulitis    #R/o OM    #Diabetic foot infection   #Uncontrolled DM with hyperglycemia    #Charcot arthropathy   #R breast pustule vs area of abscess     -trend inflammatory markers, trend cbc, wbc 9 today     -c/w vancomycin, discontinued zosyn 12/16   -ID consulted, f/u recs, likely not OM    -f/u BCx- NGTD   -podiatry following, appreciate recs, NWB to L foot    -MRI L foot noted with reactive osseous changes with early osteomyelitis also a consideration within the differential diagnosis. Early Charcot arthropathy at the midfoot.   -noted a1c >15, endo consulted, appreciate recs from Dr Bhagat   -surgery consulted for need for incision and drainage f/u recs now s/p Breast US showing collection   -pain control prn    -nutrition eval   -dvt ppx

## 2024-12-18 LAB
ANION GAP SERPL CALC-SCNC: 3 MMOL/L — LOW (ref 5–17)
BUN SERPL-MCNC: 13 MG/DL — SIGNIFICANT CHANGE UP (ref 7–18)
CALCIUM SERPL-MCNC: 9 MG/DL — SIGNIFICANT CHANGE UP (ref 8.4–10.5)
CHLORIDE SERPL-SCNC: 109 MMOL/L — HIGH (ref 96–108)
CO2 SERPL-SCNC: 26 MMOL/L — SIGNIFICANT CHANGE UP (ref 22–31)
CREAT SERPL-MCNC: 1.14 MG/DL — SIGNIFICANT CHANGE UP (ref 0.5–1.3)
EGFR: 54 ML/MIN/1.73M2 — LOW
GLUCOSE BLDC GLUCOMTR-MCNC: 120 MG/DL — HIGH (ref 70–99)
GLUCOSE BLDC GLUCOMTR-MCNC: 144 MG/DL — HIGH (ref 70–99)
GLUCOSE BLDC GLUCOMTR-MCNC: 165 MG/DL — HIGH (ref 70–99)
GLUCOSE BLDC GLUCOMTR-MCNC: 177 MG/DL — HIGH (ref 70–99)
GLUCOSE SERPL-MCNC: 225 MG/DL — HIGH (ref 70–99)
GRAM STN FLD: ABNORMAL
HCT VFR BLD CALC: 31.1 % — LOW (ref 34.5–45)
HGB BLD-MCNC: 11.2 G/DL — LOW (ref 11.5–15.5)
MCHC RBC-ENTMCNC: 26.9 PG — LOW (ref 27–34)
MCHC RBC-ENTMCNC: 36 G/DL — SIGNIFICANT CHANGE UP (ref 32–36)
MCV RBC AUTO: 74.8 FL — LOW (ref 80–100)
NRBC # BLD: 0 /100 WBCS — SIGNIFICANT CHANGE UP (ref 0–0)
PLATELET # BLD AUTO: 310 K/UL — SIGNIFICANT CHANGE UP (ref 150–400)
POTASSIUM SERPL-MCNC: 4.3 MMOL/L — SIGNIFICANT CHANGE UP (ref 3.5–5.3)
POTASSIUM SERPL-SCNC: 4.3 MMOL/L — SIGNIFICANT CHANGE UP (ref 3.5–5.3)
RBC # BLD: 4.16 M/UL — SIGNIFICANT CHANGE UP (ref 3.8–5.2)
RBC # FLD: 14.2 % — SIGNIFICANT CHANGE UP (ref 10.3–14.5)
SODIUM SERPL-SCNC: 138 MMOL/L — SIGNIFICANT CHANGE UP (ref 135–145)
SPECIMEN SOURCE: SIGNIFICANT CHANGE UP
VANCOMYCIN TROUGH SERPL-MCNC: 13.3 UG/ML — SIGNIFICANT CHANGE UP (ref 10–20)
WBC # BLD: 9.95 K/UL — SIGNIFICANT CHANGE UP (ref 3.8–10.5)
WBC # FLD AUTO: 9.95 K/UL — SIGNIFICANT CHANGE UP (ref 3.8–10.5)

## 2024-12-18 PROCEDURE — 99233 SBSQ HOSP IP/OBS HIGH 50: CPT

## 2024-12-18 RX ADMIN — Medication 12 UNIT(S): at 17:20

## 2024-12-18 RX ADMIN — Medication 250 MILLIGRAM(S): at 17:19

## 2024-12-18 RX ADMIN — Medication 1: at 08:11

## 2024-12-18 RX ADMIN — Medication 1: at 11:39

## 2024-12-18 RX ADMIN — Medication 12 UNIT(S): at 11:40

## 2024-12-18 RX ADMIN — Medication 250 MILLIGRAM(S): at 06:43

## 2024-12-18 RX ADMIN — Medication 12 UNIT(S): at 08:12

## 2024-12-18 RX ADMIN — INSULIN GLARGINE 34 UNIT(S): 100 INJECTION, SOLUTION SUBCUTANEOUS at 21:13

## 2024-12-18 NOTE — PROGRESS NOTE ADULT - PROBLEM SELECTOR PLAN 2
-   Presented with  LLE wound, TTP, edema, erythema  -   WBC 12K, Lactate 1.6,   -   s/p doxycycline 2d and mupirocin at home, 1L in the ED  -   Start Zosyn and Vancomycin  -   Continue with  Tylenol PRN for pain, NWB to LLE  -   Blood cultures   -   Wound Cultures  -   Podiatry following   -   ID following  -  12/17 MR left foot shows early OM  - ID dc'd Zosyn as noted above -   Presented with  LLE wound, TTP, edema, erythema  -   WBC 12K, Lactate 1.6,   -   s/p doxycycline 2d and mupirocin at home, 1L in the ED  -   Start Zosyn and Vancomycin  -   Continue with  Tylenol PRN for pain, NWB to LLE  -   Blood cultures   -   Wound Cultures  -   Podiatry following   -   ID following  -  12/17 MR left foot shows early OM  -  ID dc'd Zosyn as noted above, f/u final reccs

## 2024-12-18 NOTE — PROGRESS NOTE ADULT - ASSESSMENT
65 y/o female p/w DM foot ulcer found to have right breast lesion at 6 o'clock now s/p bedside I&D 12/17  VSS, afebrile, no leukocytosis    Plan   - Packing can be removed on Thursday 12/19 by surgical team if pt is still in house  - f/u culture results  - Antibiotics per ID  - Will need breast imaging in outpatient setting  - Upon discharge, please call office to schedule appointment - pt to follow  up with Dr. Webster   - strict glycemic control   - remainder of care as per primary team    follow up information: please include in dc summary   Master Webster MD  Breast Surgery Attending.

## 2024-12-18 NOTE — PROGRESS NOTE ADULT - ASSESSMENT
Left foot and leg Cellulitis - improving slowly  Right breast abscess - s/p I&D  Leukocytosis - normalized      Plan -   ·	Cont Vancomycin 1gm iv q12hrs   ·	awaiting breast abscess culture results.  ·	will plan to DC home on Friday on PO antibiotics once we have breast abscess culture results.

## 2024-12-18 NOTE — PROGRESS NOTE ADULT - SUBJECTIVE AND OBJECTIVE BOX
64y Female    Meds:  vancomycin  IVPB 1000 milliGRAM(s) IV Intermittent every 12 hours  vancomycin  IVPB        Allergies    No Known Allergies    Intolerances        VITALS:  Vital Signs Last 24 Hrs  T(C): 36.7 (18 Dec 2024 13:32), Max: 37.2 (17 Dec 2024 20:54)  T(F): 98.1 (18 Dec 2024 13:32), Max: 99 (17 Dec 2024 20:54)  HR: 88 (18 Dec 2024 13:32) (88 - 92)  BP: 143/61 (18 Dec 2024 13:32) (143/61 - 159/66)  BP(mean): --  RR: 17 (18 Dec 2024 13:32) (17 - 18)  SpO2: 98% (18 Dec 2024 13:32) (95% - 98%)    Parameters below as of 18 Dec 2024 13:32  Patient On (Oxygen Delivery Method): room air        LABS/DIAGNOSTIC TESTS:                          11.2   9.95  )-----------( 310      ( 18 Dec 2024 05:15 )             31.1         12-18    138  |  109[H]  |  13  ----------------------------<  225[H]  4.3   |  26  |  1.14    Ca    9.0      18 Dec 2024 05:15            CULTURES: .Abscess  12-17 @ 17:00 --  --    Few polymorphonuclear leukocytes seen per low power field  Few Gram positive cocci in pairs seen per oil power field      .Blood BLOOD  12-12 @ 15:40   No growth at 5 days  --  --      .Blood BLOOD  12-12 @ 15:35   No growth at 5 days  --  --            RADIOLOGY:      ROS:  [  ] UNABLE TO ELICIT 64y Female who is doing better clinically but had developed a     Meds:  vancomycin  IVPB 1000 milliGRAM(s) IV Intermittent every 12 hours  vancomycin  IVPB        Allergies    No Known Allergies    Intolerances        VITALS:  Vital Signs Last 24 Hrs  T(C): 36.7 (18 Dec 2024 13:32), Max: 37.2 (17 Dec 2024 20:54)  T(F): 98.1 (18 Dec 2024 13:32), Max: 99 (17 Dec 2024 20:54)  HR: 88 (18 Dec 2024 13:32) (88 - 92)  BP: 143/61 (18 Dec 2024 13:32) (143/61 - 159/66)  BP(mean): --  RR: 17 (18 Dec 2024 13:32) (17 - 18)  SpO2: 98% (18 Dec 2024 13:32) (95% - 98%)    Parameters below as of 18 Dec 2024 13:32  Patient On (Oxygen Delivery Method): room air        LABS/DIAGNOSTIC TESTS:                          11.2   9.95  )-----------( 310      ( 18 Dec 2024 05:15 )             31.1         12-18    138  |  109[H]  |  13  ----------------------------<  225[H]  4.3   |  26  |  1.14    Ca    9.0      18 Dec 2024 05:15            CULTURES: .Abscess  12-17 @ 17:00 --  --    Few polymorphonuclear leukocytes seen per low power field  Few Gram positive cocci in pairs seen per oil power field      .Blood BLOOD  12-12 @ 15:40   No growth at 5 days  --  --      .Blood BLOOD  12-12 @ 15:35   No growth at 5 days  --  --            RADIOLOGY:      ROS:  [  ] UNABLE TO ELICIT 64y Female who is doing better clinically but had developed a right breast abscess that she had an I&D for by the breast surgeon, her left foot redness and warmth are decreasing , she has no redness or warmth of her left lower leg but she still has 2+ edema of her left leg and foot. She is growing out gram positive cocci in pairs and chains from her breast abscess. She has no fevers , chills or diarrhea or other new complaints , she has no foot pain either.    Meds:  vancomycin  IVPB 1000 milliGRAM(s) IV Intermittent every 12 hours  vancomycin  IVPB        Allergies    No Known Allergies    Intolerances        VITALS:  Vital Signs Last 24 Hrs  T(C): 36.7 (18 Dec 2024 13:32), Max: 37.2 (17 Dec 2024 20:54)  T(F): 98.1 (18 Dec 2024 13:32), Max: 99 (17 Dec 2024 20:54)  HR: 88 (18 Dec 2024 13:32) (88 - 92)  BP: 143/61 (18 Dec 2024 13:32) (143/61 - 159/66)  BP(mean): --  RR: 17 (18 Dec 2024 13:32) (17 - 18)  SpO2: 98% (18 Dec 2024 13:32) (95% - 98%)    Parameters below as of 18 Dec 2024 13:32  Patient On (Oxygen Delivery Method): room air        LABS/DIAGNOSTIC TESTS:                          11.2   9.95  )-----------( 310      ( 18 Dec 2024 05:15 )             31.1         12-18    138  |  109[H]  |  13  ----------------------------<  225[H]  4.3   |  26  |  1.14    Ca    9.0      18 Dec 2024 05:15            CULTURES: .Abscess  12-17 @ 17:00 --  --    Few polymorphonuclear leukocytes seen per low power field  Few Gram positive cocci in pairs seen per oil power field      .Blood BLOOD  12-12 @ 15:40   No growth at 5 days  --  --      .Blood BLOOD  12-12 @ 15:35   No growth at 5 days  --  --            RADIOLOGY:      ROS:  [  ] UNABLE TO ELICIT

## 2024-12-18 NOTE — PROGRESS NOTE ADULT - PROBLEM SELECTOR PLAN 3
-  Patient with new c/o right breast pain  -  Noted to have right breast lesion  -  Ultrasound ordered to site  -  warm soaks to site  -  Surgery following.  12/17 US right breast is showing fluid collection; poss abscess; Sgy called by attdg for consult -  Patient with new c/o right breast pain  -  Noted to have right breast lesion  -  Ultrasound ordered to site  -  warm soaks to site  -  Surgery following.  - 12/17 US right breast is showing fluid collection  - s/p I&D 12/17, Culture sent, Sx plan to remove Packing 12/19.

## 2024-12-18 NOTE — PROGRESS NOTE ADULT - NUTRITIONAL ASSESSMENT
Diet, DASH/TLC:   Sodium & Cholesterol Restricted  Consistent Carbohydrate {No Snacks}  Halal  No Beef  No Poultry (12-14-24 @ 13:15) [Active]

## 2024-12-18 NOTE — PROGRESS NOTE ADULT - SKIN COMMENTS
mild erythema of left foot persists with some mild warmth also but left leg redness and warmth are gone, right breast dressed

## 2024-12-18 NOTE — PROGRESS NOTE ADULT - ASSESSMENT
A:   Left hallux distal phalanx - reactive osseous changes vs. early OM  Left Hallux diabetic ulcer - dry/stable  Charcot neuroarthropathy, left midfoot     P:  Patient evaluated chart reviewed    Vitals stable, leukocytosis absent, ESR/CRP elevated  L hallux diabetic ulcer dry and stable, no probe to bone, no purulence  MRI of L foot results reviewed - no OM of distal phalanx appreciated, early Charcot arthropathy to left midfoot   Discussed diagnosis and further treatment plans with the patient  Betadine DSD applied to the left foot.   Advised patient to keep dressings clean, dry, and intact at all times.   Stressed the importance of proper glycemic control, daily foot examinations, and wearing proper shoe gear   Explained to the patient that she is to remain NWB to the left foot in order to prevent ulcerations and worsening of midfoot Charcot deformity  Patient inquired about surgical reconstructive options for charcot; however, it was discussed with patient that her A1c levels need to controlled, so that can be considered in the future outpatient if she remains compliant with taking care of her diabetes.   All questions and concerns addressed to the patient's satisfaction. Patient demonstrated verbal understanding.   Patient is stable from a podiatry standpoint  Discussed with attending Dr. Palma     Upon discharge, patient to follow up in outpatient clinic within 1 week:   [facility]  [address] [place] [address]  P: [phone]    WCO: Betadine, 4x4 gauze, maru, ACE (M/W/)

## 2024-12-18 NOTE — PROGRESS NOTE ADULT - PROBLEM SELECTOR PLAN 7
-   Patient from home  -   Plan to return home pending clinical course -   Patient from home  -   pending final ID reccs for abt duration.   -   f/u surgery plan to remove packing 12/19.

## 2024-12-18 NOTE — PROGRESS NOTE ADULT - ASSESSMENT
Patient is a 64 year old female from home ambulates independently, with PMH of  T2DM (on insulin pump) who presented to the ED with a L hallux x 3-4d. Patient states she went to her podiatrist a few days ago and he noticed the wound when he clipped her nail and prescribed her doxycycline 100mg BID, of which she's taken 2 full days.   Patient is being admitted to Left hallux steomyelitis of L hallux and uncontrolled DM. Podiatry/ID and Endocrine consulted. MR shows Early OM/Charcot arthropathy at the midfoot. on Vanco/Zosyn. Insulin adjusted.     Patient is a 64 year old female from home ambulates independently, with PMH of  T2DM (on insulin pump) who presented to the ED with a L hallux x 3-4d. Patient states she went to her podiatrist a few days ago and he noticed the wound when he clipped her nail and prescribed her doxycycline 100mg BID, of which she's taken 2 full days.   Patient is being admitted to Left hallux diabetic ulcer/cellulitis r/o OM and uncontrolled DM. A1C > 15%. Podiatry/ID and Endocrine consulted.   MR shows Early OM/Charcot arthropathy at the midfoot. D/C'd Zosyn, c/w Vanco. Insulin adjusted.   Pt with right breast pain with lesion. Surgery followed. R breast US resulted a complex collection within the subcutaneous soft tissues. s/p I&D 12/17, Culture sent. Sx plan to remove packing 12/19.   Pending ID reccs for duration of ABT.

## 2024-12-18 NOTE — PROGRESS NOTE ADULT - PEDAL EDEMA SEVERITY
06/09/23      Rudy SHEPHERD Aidan  [address]  [place] [address]  YOB: 1974    To whom it May concern,     I am a Psychiatrist.  Rudy has been my patient since 03/28/2023.  He has had   3 sessions with me so far on 03/28/2023, 04/21/2023 and 06/01/2023.  I recommend continuing follow-up every 4-6 weeks or as needed.      Please feel free to contact me if you have any questions.      Sincerely,         Victoria Samuels MD  [facility]   [address] [place] [address]  Phone: [phone]  Fax: [phone]                 2+

## 2024-12-18 NOTE — PROGRESS NOTE ADULT - PROBLEM SELECTOR PLAN 1
-   MR left foot: Soft tissue wound about the great toe with no low T1 signal and patchy high STIR signal within the distal phalanx of the great toe deep to the wound. Findings may reflect reactive osseous changes with early   osteomyelitis also a consideration within the differential diagnosis.  Early Charcot arthropathy at the midfoot.  -   Podiatry following  -   ID:  Dr Mckenzie following  -   Continue with Vancomycin 1 gram Q12 hours; Held today for Trough of 30--> rpt in am  -   Continue with Zosyn dc'd by ID 2/2 clinically don't appear to be OM  -   CHARMAINE/PVR -  + evidence for disease affecting the small arteries of the left foot. -   MR left foot: Soft tissue wound about the great toe with no low T1 signal and patchy high STIR signal within the distal phalanx of the great toe deep to the wound. Findings may reflect reactive osseous changes with early   osteomyelitis also a consideration within the differential diagnosis.  Early Charcot arthropathy at the midfoot.  -   Podiatry following, c/w local tx  -   ID:  Dr Mckenzie following,  f/u final ID reccs   -   Continue with Vancomycin 1 gram Q12 hours; Held today for Trough of 30--> rpt in am  -   Continue with Zosyn dc'd by ID 2/2 clinically don't appear to be OM  -   CHARMAINE/PVR -  + evidence for disease affecting the small arteries of the left foot.

## 2024-12-18 NOTE — PROGRESS NOTE ADULT - SUBJECTIVE AND OBJECTIVE BOX
Interval Events:      Allergies    No Known Allergies    Intolerances      Endocrine/Metabolic Medications:  insulin glargine Injectable (LANTUS) 34 Unit(s) SubCutaneous at bedtime  insulin lispro (ADMELOG) corrective regimen sliding scale   SubCutaneous three times a day before meals  insulin lispro (ADMELOG) corrective regimen sliding scale   SubCutaneous at bedtime  insulin lispro Injectable (ADMELOG) 12 Unit(s) SubCutaneous three times a day before meals      Vital Signs Last 24 Hrs  T(C): 37.1 (18 Dec 2024 05:23), Max: 37.2 (17 Dec 2024 20:54)  T(F): 98.7 (18 Dec 2024 05:23), Max: 99 (17 Dec 2024 20:54)  HR: 88 (18 Dec 2024 05:23) (88 - 92)  BP: 143/62 (18 Dec 2024 05:23) (143/62 - 159/66)  BP(mean): --  RR: 18 (18 Dec 2024 05:23) (16 - 18)  SpO2: 95% (18 Dec 2024 05:23) (95% - 97%)    Parameters below as of 18 Dec 2024 05:23  Patient On (Oxygen Delivery Method): room air          PHYSICAL EXAM  All physical exam findings normal, except those marked:  General:	Alert, active, cooperative, NAD, well hydrated  .		[] Abnormal:  Neck		Normal: supple, no cervical adenopathy, no palpable thyroid  .		[] Abnormal:  Cardiovascular	Normal: regular rate, normal S1, S2, no murmurs  .		[] Abnormal:  Respiratory	Normal: no chest wall deformity, normal respiratory pattern, CTA B/L  .		[] Abnormal:  Abdominal	Normal: soft, ND, NT, bowel sounds present, no masses, no organomegaly  .		[] Abnormal:  		Normal normal genitalia, testes descended, circumcised/uncircumcised  .		Michael stage:			Breast michael:  .		Menstrual history:  .		[] Abnormal:  Extremities	Normal: FROM x4  .		[] Abnormal:  Skin		Normal: intact and not indurated, no rash, no acanthosis nigricans  .		[] Abnormal:  Neurologic	Normal: grossly intact  .		[] Abnormal:    LABS                        11.2   9.95  )-----------( 310      ( 18 Dec 2024 05:15 )             31.1                               138    |  109    |  13                  Calcium: 9.0   / iCa: x      (12-18 @ 05:15)    ----------------------------<  225       Magnesium: x                                4.3     |  26     |  1.14             Phosphorous: x          CAPILLARY BLOOD GLUCOSE      POCT Blood Glucose.: 177 mg/dL (18 Dec 2024 07:52)  POCT Blood Glucose.: 152 mg/dL (17 Dec 2024 21:58)  POCT Blood Glucose.: 174 mg/dL (17 Dec 2024 16:41)  POCT Blood Glucose.: 215 mg/dL (17 Dec 2024 13:06)  POCT Blood Glucose.: 251 mg/dL (17 Dec 2024 11:31)        Assesment/plan       Interval Events:  pt in nad    Allergies    No Known Allergies    Intolerances      Endocrine/Metabolic Medications:  insulin glargine Injectable (LANTUS) 34 Unit(s) SubCutaneous at bedtime  insulin lispro (ADMELOG) corrective regimen sliding scale   SubCutaneous three times a day before meals  insulin lispro (ADMELOG) corrective regimen sliding scale   SubCutaneous at bedtime  insulin lispro Injectable (ADMELOG) 12 Unit(s) SubCutaneous three times a day before meals      Vital Signs Last 24 Hrs  T(C): 37.1 (18 Dec 2024 05:23), Max: 37.2 (17 Dec 2024 20:54)  T(F): 98.7 (18 Dec 2024 05:23), Max: 99 (17 Dec 2024 20:54)  HR: 88 (18 Dec 2024 05:23) (88 - 92)  BP: 143/62 (18 Dec 2024 05:23) (143/62 - 159/66)  BP(mean): --  RR: 18 (18 Dec 2024 05:23) (16 - 18)  SpO2: 95% (18 Dec 2024 05:23) (95% - 97%)    Parameters below as of 18 Dec 2024 05:23  Patient On (Oxygen Delivery Method): room air          PHYSICAL EXAM  All physical exam findings normal, except those marked:  General:	Alert, active, cooperative, NAD, well hydrated  .		[] Abnormal:  Neck		Normal: supple, no cervical adenopathy, no palpable thyroid  .		[] Abnormal:  Cardiovascular	Normal: regular rate, normal S1, S2, no murmurs  .		[] Abnormal:  Respiratory	Normal: no chest wall deformity, normal respiratory pattern, CTA B/L  .		[] Abnormal:  Abdominal	Normal: soft, ND, NT, bowel sounds present, no masses, no organomegaly  .		[] Abnormal:  		Normal normal genitalia, testes descended, circumcised/uncircumcised  .		Michael stage:			Breast michael:  .		Menstrual history:  .		[] Abnormal:  Extremities	Normal: FROM x4  .		[] Abnormal:  Skin		Normal: intact and not indurated, no rash, no acanthosis nigricans  .		[] Abnormal:  Neurologic	Normal: grossly intact  .		[] Abnormal:    LABS                        11.2   9.95  )-----------( 310      ( 18 Dec 2024 05:15 )             31.1                               138    |  109    |  13                  Calcium: 9.0   / iCa: x      (12-18 @ 05:15)    ----------------------------<  225       Magnesium: x                                4.3     |  26     |  1.14             Phosphorous: x          CAPILLARY BLOOD GLUCOSE      POCT Blood Glucose.: 177 mg/dL (18 Dec 2024 07:52)  POCT Blood Glucose.: 152 mg/dL (17 Dec 2024 21:58)  POCT Blood Glucose.: 174 mg/dL (17 Dec 2024 16:41)  POCT Blood Glucose.: 215 mg/dL (17 Dec 2024 13:06)  POCT Blood Glucose.: 251 mg/dL (17 Dec 2024 11:31)        Assesment/plan    Patient is a 64 year old female from home ambulates independently, with PMH of  T2DM (on insulin pump) who presented to the ED with a L hallux x 3-4d.   Endocrine consulted for dm management. Pt takes novolog via Cequer/ Simplicity without basal insulin. Chacks fsg 200-300s usually. Denies hypoglycemia. F/u with endo- Dr. Grace.         Problem/Recommendation - 1:  ·  Problem: DM (diabetes mellitus).   ·  Recommendation: uncontrolled with hyperglycemia  cont lantus 34 units- change to treseba upon d/c  and admelog to 12 ac tid- cange to Cequer/simplicity- 6 clicks each meal   a1c->15%!  compliance d/w pt  fsg ac and hs  d/w pt need for basal/bolus insulin  nutrition eval  d/w prim team     Problem/Recommendation - 2:  ·  Problem: Diabetic foot ulcer.   ·  Recommendation: cont iv bax  tx per podiatry and ID.

## 2024-12-18 NOTE — PROGRESS NOTE ADULT - NS ATTEND AMEND GEN_ALL_CORE FT
65 yo F with PMHx of uncontrolled IDDM p/w for worsening foot wound concerning for infection on L foot, admitted for diabetic foot infection c/f OM with overlying cellulitis course c/b R breast tenderness, swelling now pending work up.      This morning patient has no complaints and minimal pain in R breast after I&D drainage, still with packing in aware that it will be taken out tomorrow 12/19 and she’ll be here today, no issues, very pleasant.       Vitals, labs, imaging reviewed. Afebrile, VSS, no leukocytosis                         11.2   9.95  )-----------( 310      ( 18 Dec 2024 05:15 )             31.1   138  |  109[H]  |  13  ----------------------------( 225[H]     12-18 @ 05:15  4.3   |  26  |  1.14    Ca: 9.0   Phos: x    Mg: x     TPro: x  / Alb: x  /  TBili x  / DBili x  /  AST x  /  ALT x  /  AlkPhos  x     Exam:   Nontoxic appearing female, NAD   RRR   Lungs CTA b/l R breast redness mild tenderness on palpation    Abdo soft NTD   RLE wnl   LLE with great toe 2cm cut with small area behind the tip of toe, dressed with betadine, wrapped in ace wrap, L foot dorsum and ankle with warmth, erythema, decreasing in comparison, 3cm region under the R breast with warmth, ttp and mild fluctuance     A/P:   #Left Foot great toe ulceration with overlying cellulitis    #R/o OM    #Diabetic foot infection   #Uncontrolled DM with hyperglycemia    #Charcot arthropathy   #R breast pustule vs area of abscess     -US showing collection s/p I&D on 12/17, packing to be removed 12/19   -trend inflammatory markers, trend cbc, wbc 9 today     -c/w vancomycin, discontinued zosyn 12/16   -ID following, Dr. Mckenzie, f/u recs on duration and po options    -f/u BCx- NGTD   -podiatry following, appreciate recs, NWB to L foot    -MRI L foot noted with reactive osseous changes with early osteomyelitis also a consideration within the differential diagnosis. Early Charcot arthropathy at the midfoot.   -noted a1c >15, endo consulted, appreciate recs from Dr Bhagat   -surgery consulted for need for incision and drainage of R breast ?abscess   -pain control prn    -nutrition eval   -dvt ppx   -will need full breast imaging outpatient with US or Mammo

## 2024-12-18 NOTE — PROGRESS NOTE ADULT - SUBJECTIVE AND OBJECTIVE BOX
NP Note discussed with  Primary Attending    INTERVAL HPI/OVERNIGHT EVENTS: no new complaints    MEDICATIONS  (STANDING):  enoxaparin Injectable 40 milliGRAM(s) SubCutaneous every 24 hours  insulin glargine Injectable (LANTUS) 34 Unit(s) SubCutaneous at bedtime  insulin lispro (ADMELOG) corrective regimen sliding scale   SubCutaneous three times a day before meals  insulin lispro (ADMELOG) corrective regimen sliding scale   SubCutaneous at bedtime  insulin lispro Injectable (ADMELOG) 12 Unit(s) SubCutaneous three times a day before meals  vancomycin  IVPB 1000 milliGRAM(s) IV Intermittent every 12 hours  vancomycin  IVPB        MEDICATIONS  (PRN):  acetaminophen     Tablet .. 650 milliGRAM(s) Oral every 6 hours PRN Temp greater or equal to 38C (100.4F), Mild Pain (1 - 3)      __________________________________________________  REVIEW OF SYSTEMS:    CONSTITUTIONAL: No fever,   EYES: no acute visual disturbances  NECK: No pain or stiffness  RESPIRATORY: No cough; No shortness of breath  CARDIOVASCULAR: No chest pain, no palpitations  GASTROINTESTINAL: No pain. No nausea or vomiting; No diarrhea   NEUROLOGICAL: No headache or numbness, no tremors  MUSCULOSKELETAL: No joint pain, no muscle pain  GENITOURINARY: no dysuria, no frequency, no hesitancy  PSYCHIATRY: no depression , no anxiety  ALL OTHER  ROS negative        Vital Signs Last 24 Hrs  T(C): 37.1 (18 Dec 2024 05:23), Max: 37.2 (17 Dec 2024 20:54)  T(F): 98.7 (18 Dec 2024 05:23), Max: 99 (17 Dec 2024 20:54)  HR: 88 (18 Dec 2024 05:23) (88 - 92)  BP: 143/62 (18 Dec 2024 05:23) (143/62 - 159/66)  BP(mean): --  RR: 18 (18 Dec 2024 05:23) (16 - 18)  SpO2: 95% (18 Dec 2024 05:23) (95% - 97%)    Parameters below as of 18 Dec 2024 05:23  Patient On (Oxygen Delivery Method): room air        ________________________________________________  PHYSICAL EXAM:  GENERAL: NAD  HEENT: Normocephalic;  conjunctivae and sclerae clear; moist mucous membranes;   NECK : supple  CHEST/LUNG: Clear to auscultation bilaterally with good air entry Right breast packing in place, no discharge observed  HEART: S1 S2  regular; no murmurs, gallops or rubs  ABDOMEN: Soft, Nontender, Nondistended; Bowel sounds present  EXTREMITIES: no cyanosis; no edema; no calf tenderness  SKIN: warm and dry; no rash, left foot dressing C/D/I  NERVOUS SYSTEM:  Awake and alert; Oriented  to place, person and time ; no new deficits    _________________________________________________  LABS:                        11.2   9.95  )-----------( 310      ( 18 Dec 2024 05:15 )             31.1     12-18    138  |  109[H]  |  13  ----------------------------<  225[H]  4.3   |  26  |  1.14    Ca    9.0      18 Dec 2024 05:15        Urinalysis Basic - ( 18 Dec 2024 05:15 )    Color: x / Appearance: x / SG: x / pH: x  Gluc: 225 mg/dL / Ketone: x  / Bili: x / Urobili: x   Blood: x / Protein: x / Nitrite: x   Leuk Esterase: x / RBC: x / WBC x   Sq Epi: x / Non Sq Epi: x / Bacteria: x      CAPILLARY BLOOD GLUCOSE      POCT Blood Glucose.: 165 mg/dL (18 Dec 2024 11:16)  POCT Blood Glucose.: 177 mg/dL (18 Dec 2024 07:52)  POCT Blood Glucose.: 152 mg/dL (17 Dec 2024 21:58)  POCT Blood Glucose.: 174 mg/dL (17 Dec 2024 16:41)  POCT Blood Glucose.: 215 mg/dL (17 Dec 2024 13:06)        RADIOLOGY & ADDITIONAL TESTS:    Imaging  Reviewed:  YES    < from: MR Foot No Cont, Left (12.13.24 @ 11:42) >    ACC: [id number] EXAM:  MR FOOT LT   ORDERED BY: FARZANA SÁNCHEZ     PROCEDURE DATE:  12/13/2024          INTERPRETATION:  Exam Type: MR FOOT LEFT  Exam Date and Time: 12/13/2024 11:42 AM  Indication: Left hallux wound. Concern for osteomyelitis.  Comparison: Foot x-rays from one day prior.    TECHNIQUE:  Multiplanar multisequence MRI of the left ankle was performed.    FINDINGS:  There is a soft tissue wound about the tip of the great toe. Mild   surrounding inflammatory changes are present. There is no organized fluid   collection seen at this location. Deep to the wound, there is no low T1   signal identified within the distal phalanx of the great toe. Scattered   patchy high STIR signal is seen. There is no fracture or dislocation.   There are findings of early Charcot arthropathy at the midfoot. There is   no tenosynovitis. Fatty atrophy of the intrinsic forefoot musculature.   Dorsal foot subcutaneous tissue swelling.      IMPRESSION:  Soft tissue wound about the great toe with no low T1 signal and patchy   high STIR signal within the distal phalanx of the great toe deep to the   wound. Findings may reflect reactive osseous changes with early   osteomyelitis also a consideration within the differential diagnosis.    Early Charcot arthropathy at the midfoot.    --- End of Report ---            ZOHRA MADERA MD; Attending Radiologist  This document has been electronically signed. Dec 13 2024 12:06PM    < end of copied text >  < from: Xray Foot AP + Lateral, Left (12.12.24 @ 17:18) >    ACC: [id number] EXAM:  XR FOOT 2 VIEWS LT   ORDERED BY: NICOLÁS REYES     ACC: [id number] EXAM:  XR CHEST PA LAT 2V   ORDERED BY: NICOLÁS REYES     PROCEDURE DATE:  12/12/2024          INTERPRETATION:  Toe pain.    AP chest. Prior 12/20/2016.    Low lung volumes. No change heart mediastinum. Grossly clear lungs.    IMPRESSION: Shallow inspiration. Grossly clear lungs.    --- End of Report ---            ELDA CASEY MD; Attending Radiologist  This document has been electronically signed. Dec 13 2024 11:45AM    < end of copied text >    Consultant(s) Notes Reviewed:   YES      Plan of care was discussed with patient and /or primary care giver; all questions and concerns were addressed

## 2024-12-18 NOTE — PROGRESS NOTE ADULT - SUBJECTIVE AND OBJECTIVE BOX
Podiatry Interval: Patient was seen sitting in her chair resting comfortably. No acute overnight events noted. Patient denies any malodor or drainage coming from her dressings. She has been compliant with keeping her dressings clean, dry, and intact. She has been ambulating with a walker as directed. Patient denies any other pedal complaints and no constitutional symptoms such as F/C/N/V/SOB. AAOx3, NAD.    Podiatry HPI: Podiatry consulted for 64-year-old female with chief complaint of left foot diabetic infection. Patient sent in by her podiatrist, Dr. Delarosa. States she saw Dr. Delarosa last week who noticed an ulceration on her left hallux. Patient states she was prescribed Doxycycline 100 mg BID and was instructed to apply Mupirocin ointment to the wound. She comments that she has taken 2 days worth of the Doxycycline.  Patient states the wound has worsened in appearance over the last week and was instructed by her podiatrist to come in to the ED. She also complains of longstanding redness and swelling to the left foot for over a month now. Patient complains of subjective fever and chills. Denies n/v.      Medications acetaminophen     Tablet .. 650 milliGRAM(s) Oral every 6 hours PRN  enoxaparin Injectable 40 milliGRAM(s) SubCutaneous every 24 hours  insulin glargine Injectable (LANTUS) 34 Unit(s) SubCutaneous at bedtime  insulin lispro (ADMELOG) corrective regimen sliding scale   SubCutaneous three times a day before meals  insulin lispro (ADMELOG) corrective regimen sliding scale   SubCutaneous at bedtime  insulin lispro Injectable (ADMELOG) 12 Unit(s) SubCutaneous three times a day before meals  vancomycin  IVPB 1000 milliGRAM(s) IV Intermittent every 12 hours  vancomycin  IVPB        FH,   PMHDM (diabetes mellitus)    HTN (hypertension)    Cataract of both eyes, unspecified cataract type       PSHNo significant past surgical history    Cataract of both eyes, unspecified cataract type    History of cholecystectomy        Labs                          11.2   9.95  )-----------( 310      ( 18 Dec 2024 05:15 )             31.1      12-18    138  |  109[H]  |  13  ----------------------------<  225[H]  4.3   |  26  |  1.14    Ca    9.0      18 Dec 2024 05:15       Vital Signs Last 24 Hrs  T(C): 37.1 (18 Dec 2024 05:23), Max: 37.2 (17 Dec 2024 20:54)  T(F): 98.7 (18 Dec 2024 05:23), Max: 99 (17 Dec 2024 20:54)  HR: 88 (18 Dec 2024 05:23) (88 - 92)  BP: 143/62 (18 Dec 2024 05:23) (143/62 - 159/66)  BP(mean): --  RR: 18 (18 Dec 2024 05:23) (16 - 18)  SpO2: 95% (18 Dec 2024 05:23) (95% - 97%)    Parameters below as of 18 Dec 2024 05:23  Patient On (Oxygen Delivery Method): room air      Sedimentation Rate, Erythrocyte: 62 mm/Hr (12-14-24 @ 04:15)  Sedimentation Rate, Erythrocyte: 67 mm/Hr (12-12-24 @ 19:34)         C-Reactive Protein: 48.3 mg/L (12-14-24 @ 04:15)  C-Reactive Protein: 121.0 mg/L (12-12-24 @ 15:35)   WBC Count: 9.95 K/uL (12-18-24 @ 05:15)      LE FOCUSED PHYSICAL EXAM:   Vasc: DP pulses palpable 1/4 bilaterally. TG within normal limits bilaterally with no more increase in temperature to the left hallux. Pedal hair present. Diffuse erythema and 1+ pitting edema to entire left foot and leg now significantly improved and localized to the left hallux.  Derm: Area of necrosis noted to distal tip of left hallux. Small wound that is now dry/stable, measuring 0.5 x 1.0 cm with fibrograunlar wound base and hyperkeratotic marcus-wound/overlay. No more serous drainage noted. Wound edges are no longer macerated. Tunneling noted medially about 0.7 cm. No fluctuance, no soft tissue crepitus, no malodor, no drainage, no purulence, no streaking.   Neuro: Protective sensation diminished bilaterally  MSK: Muscle strength deferred. Negative calf tenderness bilaterally. Mild tenderness upon palpation directly to the left hallux wound.       IMAGING:      ACC: [id number] EXAM:  XR FOOT 2 VIEWS LT   ORDERED BY: NICOLÁS REYES   ACC: [id number] EXAM:  XR CHEST PA LAT 2V   ORDERED BY: NICOLÁS REYES   PROCEDURE DATE:  12/12/2024    INTERPRETATION:  Toe pain.    AP chest. Prior 12/20/2016.    Low lung volumes. No change heart mediastinum. Grossly clear lungs.    IMPRESSION: Shallow inspiration. Grossly clear lungs.    Podiatry resident read: Midfoot charcot changes. No soft tissue emphysema. No cortical erosions of the hallux.     --- End of Report ---      ACC: [id number] EXAM:  MR FOOT LT   ORDERED BY: FARZANA SÁNCHEZ   PROCEDURE DATE:  12/13/2024    INTERPRETATION:  Exam Type: MR FOOT LEFT  Exam Date and Time: 12/13/2024 11:42 AM  Indication: Left hallux wound. Concern for osteomyelitis.  Comparison: Foot x-rays from one day prior.    TECHNIQUE:  Multiplanar multisequence MRI of the left ankle was performed.    FINDINGS:  There is a soft tissue wound about the tip of the great toe. Mild   surrounding inflammatory changes are present. There is no organized fluid   collection seen at this location. Deep to the wound, there is no low T1   signal identified within the distal phalanx of the great toe. Scattered   patchy high STIR signal is seen. There is no fracture or dislocation.   There are findings of early Charcot arthropathy at the midfoot. There is   no tenosynovitis. Fatty atrophy of the intrinsic forefoot musculature.   Dorsal foot subcutaneous tissue swelling.    IMPRESSION:  Soft tissue wound about the great toe with no low T1 signal and patchy   high STIR signal within the distal phalanx of the great toe deep to the   wound. Findings may reflect reactive osseous changes with early   osteomyelitis also a consideration within the differential diagnosis.    Early Charcot arthropathy at the midfoot.    --- End of Report ---

## 2024-12-18 NOTE — PROGRESS NOTE ADULT - SUBJECTIVE AND OBJECTIVE BOX
INTERVAL HPI/OVERNIGHT EVENTS:  Pt resting comfortably. No acute complaints overnight. Tolerating regular diet. Pt admits to improved R breast pain. Denies fever, chills.    MEDICATIONS  (STANDING):  enoxaparin Injectable 40 milliGRAM(s) SubCutaneous every 24 hours  insulin glargine Injectable (LANTUS) 34 Unit(s) SubCutaneous at bedtime  insulin lispro (ADMELOG) corrective regimen sliding scale   SubCutaneous three times a day before meals  insulin lispro (ADMELOG) corrective regimen sliding scale   SubCutaneous at bedtime  insulin lispro Injectable (ADMELOG) 12 Unit(s) SubCutaneous three times a day before meals  vancomycin  IVPB 1000 milliGRAM(s) IV Intermittent every 12 hours  vancomycin  IVPB        MEDICATIONS  (PRN):  acetaminophen     Tablet .. 650 milliGRAM(s) Oral every 6 hours PRN Temp greater or equal to 38C (100.4F), Mild Pain (1 - 3)      Vital Signs Last 24 Hrs  T(C): 37.1 (18 Dec 2024 05:23), Max: 37.2 (17 Dec 2024 20:54)  T(F): 98.7 (18 Dec 2024 05:23), Max: 99 (17 Dec 2024 20:54)  HR: 88 (18 Dec 2024 05:23) (88 - 92)  BP: 143/62 (18 Dec 2024 05:23) (143/62 - 159/66)  BP(mean): --  RR: 18 (18 Dec 2024 05:23) (16 - 18)  SpO2: 95% (18 Dec 2024 05:23) (95% - 97%)    Parameters below as of 18 Dec 2024 05:23  Patient On (Oxygen Delivery Method): room air        Physical:  General: A&Ox3. NAD.  Resp: Unlabored breathing. Equal chest rise bilaterally.   R breast s/p bedside I&D of abscess located at 6 o clock position. Dressing clean, dry, intact. Packing in place. No active bleeding / purulent drainage. Surrounding skin mildly erythematous, soft. Minimal tenderness during examination.     I&O's Detail      LABS:                        11.2   9.95  )-----------( 310      ( 18 Dec 2024 05:15 )             31.1             12-18    138  |  109[H]  |  13  ----------------------------<  225[H]  4.3   |  26  |  1.14    Ca    9.0      18 Dec 2024 05:15        64y.o. Female

## 2024-12-19 ENCOUNTER — TRANSCRIPTION ENCOUNTER (OUTPATIENT)
Age: 64
End: 2024-12-19

## 2024-12-19 LAB
ANION GAP SERPL CALC-SCNC: 6 MMOL/L — SIGNIFICANT CHANGE UP (ref 5–17)
BUN SERPL-MCNC: 12 MG/DL — SIGNIFICANT CHANGE UP (ref 7–18)
CALCIUM SERPL-MCNC: 8.6 MG/DL — SIGNIFICANT CHANGE UP (ref 8.4–10.5)
CHLORIDE SERPL-SCNC: 109 MMOL/L — HIGH (ref 96–108)
CO2 SERPL-SCNC: 24 MMOL/L — SIGNIFICANT CHANGE UP (ref 22–31)
CREAT SERPL-MCNC: 1.09 MG/DL — SIGNIFICANT CHANGE UP (ref 0.5–1.3)
CULTURE RESULTS: ABNORMAL
EGFR: 57 ML/MIN/1.73M2 — LOW
GLUCOSE BLDC GLUCOMTR-MCNC: 103 MG/DL — HIGH (ref 70–99)
GLUCOSE BLDC GLUCOMTR-MCNC: 144 MG/DL — HIGH (ref 70–99)
GLUCOSE BLDC GLUCOMTR-MCNC: 97 MG/DL — SIGNIFICANT CHANGE UP (ref 70–99)
GLUCOSE BLDC GLUCOMTR-MCNC: 97 MG/DL — SIGNIFICANT CHANGE UP (ref 70–99)
GLUCOSE SERPL-MCNC: 149 MG/DL — HIGH (ref 70–99)
HCT VFR BLD CALC: 30.3 % — LOW (ref 34.5–45)
HGB BLD-MCNC: 10.7 G/DL — LOW (ref 11.5–15.5)
MCHC RBC-ENTMCNC: 26.1 PG — LOW (ref 27–34)
MCHC RBC-ENTMCNC: 35.3 G/DL — SIGNIFICANT CHANGE UP (ref 32–36)
MCV RBC AUTO: 73.9 FL — LOW (ref 80–100)
NRBC # BLD: 0 /100 WBCS — SIGNIFICANT CHANGE UP (ref 0–0)
PLATELET # BLD AUTO: 308 K/UL — SIGNIFICANT CHANGE UP (ref 150–400)
POTASSIUM SERPL-MCNC: 4 MMOL/L — SIGNIFICANT CHANGE UP (ref 3.5–5.3)
POTASSIUM SERPL-SCNC: 4 MMOL/L — SIGNIFICANT CHANGE UP (ref 3.5–5.3)
RBC # BLD: 4.1 M/UL — SIGNIFICANT CHANGE UP (ref 3.8–5.2)
RBC # FLD: 14.4 % — SIGNIFICANT CHANGE UP (ref 10.3–14.5)
SODIUM SERPL-SCNC: 139 MMOL/L — SIGNIFICANT CHANGE UP (ref 135–145)
SPECIMEN SOURCE: SIGNIFICANT CHANGE UP
VANCOMYCIN TROUGH SERPL-MCNC: 24.2 UG/ML — HIGH (ref 10–20)
VANCOMYCIN TROUGH SERPL-MCNC: 46.3 UG/ML — CRITICAL HIGH (ref 10–20)
WBC # BLD: 10.05 K/UL — SIGNIFICANT CHANGE UP (ref 3.8–10.5)
WBC # FLD AUTO: 10.05 K/UL — SIGNIFICANT CHANGE UP (ref 3.8–10.5)

## 2024-12-19 PROCEDURE — 99233 SBSQ HOSP IP/OBS HIGH 50: CPT

## 2024-12-19 RX ORDER — LIDOCAINE 40 MG/G
1 CREAM TOPICAL DAILY
Refills: 0 | Status: DISCONTINUED | OUTPATIENT
Start: 2024-12-19 | End: 2024-12-20

## 2024-12-19 RX ORDER — VANCOMYCIN HCL 900 MCG/MG
1000 POWDER (GRAM) MISCELLANEOUS EVERY 12 HOURS
Refills: 0 | Status: DISCONTINUED | OUTPATIENT
Start: 2024-12-19 | End: 2024-12-20

## 2024-12-19 RX ADMIN — Medication 12 UNIT(S): at 08:32

## 2024-12-19 RX ADMIN — LIDOCAINE 1 PATCH: 40 CREAM TOPICAL at 19:56

## 2024-12-19 RX ADMIN — Medication 12 UNIT(S): at 12:13

## 2024-12-19 RX ADMIN — LIDOCAINE 1 PATCH: 40 CREAM TOPICAL at 13:53

## 2024-12-19 RX ADMIN — LIDOCAINE 1 PATCH: 40 CREAM TOPICAL at 13:52

## 2024-12-19 RX ADMIN — Medication 12 UNIT(S): at 17:30

## 2024-12-19 RX ADMIN — Medication 250 MILLIGRAM(S): at 05:44

## 2024-12-19 NOTE — DISCHARGE NOTE PROVIDER - HOSPITAL COURSE
64 year old female from home ambulates independently, with PMH of  T2DM (on insulin pump) who presented to the ED with a L hallux discomfort  x 3-4d. Admitted for Left hallux diabetic ulcer/cellulitis r/o OM and uncontrolled DM.    incomplete 12/19 Patient is a 64 year old female from home ambulates independently, with PMH of  T2DM (on insulin pump) who presented to the ED with a L hallux x 3-4d. Patient states she went to her podiatrist a few days ago and he noticed the wound when he clipped her nail and prescribed her doxycycline 100mg BID, of which she's taken 2 full days.   Patient is being admitted to Left hallux diabetic ulcer/cellulitis r/o OM and uncontrolled DM. A1C > 15%. Podiatry/ID and Endocrine consulted. Blood culture no growth.   MR shows Early OM/Charcot arthropathy at the midfoot. D/C'd Zosyn, c/w Vanco. Insulin adjusted.   Endocrine reccs cont lantus 34 units while inpatient then change to Tresiba upon d/c, and ADMELOG to 12 ac tid- change to Cequer/simplicity- 6 clicks each meal     Pt with right breast pain with lesion. Surgery followed. R breast US resulted a complex collection within the subcutaneous soft tissues. s/p I&D 12/17, Abscess culture resulted Few Gram positive cocci in pairs seen per oil power field. Packing removed and gauze/tape placed under R breast 12/19. Surgery reccs pt will need breast imaging in outpatient setting, Upon discharge, pt to follow  up with Dr. Webster.   ID reccs to continue oral antibiotic -------.     Patient is medically optimized for discharge home with outpatient follow up.     incomplete 12/19    Please refer to medical records for in depth hospital course.   Discharge plan discussed with attending calixto. Patient is a 64 year old female from home ambulates independently, with PMH of  T2DM (on insulin pump) who presented to the ED with a L hallux x 3-4d. Patient states she went to her podiatrist a few days ago and he noticed the wound when he clipped her nail and prescribed her doxycycline 100mg BID, of which she's taken 2 full days.     Patient is being admitted to Left hallux diabetic ulcer/cellulitis r/o OM and uncontrolled DM. A1C > 15%. Podiatry/ID and Endocrine consulted. Blood culture NGTD.   MRI of L foot results reviewed with podiatry - no OM of distal phalanx appreciated, early Charcot arthropathy to left midfoot.  Stressed the importance of proper glycemic control, daily foot examinations, and wearing proper shoe gear.  Patient inquired about surgical reconstructive options for Charcot; however, it was discussed with patient that her A1c levels need to controlled, so that can be considered in the future outpatient if she remains compliant with taking care of her diabetes as per podiatry. Patient demonstrated verbal understanding. WCO: Betadine, 4x4 gauze, maru, ACE (M/W/F)   Per ID, D/C'd Zosyn, Started Vanco. Insulin adjusted per Endo.   Endocrine reccs cont lantus 34 units while inpatient then change to Tresiba upon d/c, and ADMELOG to 12 ac tid- change to Cequer/simplicity- 6 clicks each meal     Pt with right breast pain with lesion. Surgery followed. R breast US resulted a complex collection within the subcutaneous soft tissues. s/p I&D 12/17, Abscess culture resulted Few Finegoldia magna. Bcx negative x 5days. Packing removed and gauze/tape placed under R breast 12/19. Surgery reccs pt will need breast imaging in outpatient setting, Upon discharge, pt to follow  up with Dr. Webster.   ID reccs to continue oral antibiotic Augmentin 875mg BID x 10 days upon discharge.   PT recs no skilled PT needed.     Patient is medically optimized for discharge home with outpatient follow up.   Please refer to medical records for in depth hospital course.   Discharge plan discussed with attending physician.

## 2024-12-19 NOTE — DIETITIAN INITIAL EVALUATION ADULT - OTHER INFO
Pt visited. Pt OOB to chair. Pt reports Good appetite. NO KNOWN FOOD ALLERGY Reported/ Per Pt H/O DM x ~ > 15 years. Pt in Insulin Pump at home which was started ~ 3 months ago. Pt reports Compliance to the Diet in Fair amount. Pt Provided with diet education and  reinforced compliance  to the diet. Pt Receptive. Endo On case. A1c > 15.1 Meds Adjusted.  Pt visited. Pt OOB to chair. Pt reports Good appetite. NO KNOWN FOOD ALLERGY Reported/ Per Pt H/O DM x ~ > 15 years. Pt in Insulin Pump at home which was started ~ 3 months ago. Pt reports Compliance to the Diet in Fair amount. Pt Provided with diet education and  reinforced compliance  to the diet. Pt Receptive. Endo On case. A1c > 15.1 Meds Adjusted. Pt w  Acute Osteomyelitis. Pt w diabetic foot ulcer. Pt may benefit from MVI w MIN, Vit c, Zn.  Pt visited. Pt OOB to chair. Pt reports Good appetite. NO KNOWN FOOD ALLERGY Reported/ Per Pt H/O DM x ~ > 15 years. Pt in Insulin Pump at home which was started ~ 3 months ago. Pt reports Compliance to the Diet in Fair amount. Pt Provided with diet education and  reinforced compliance  to the diet. Pt Receptive. Endo On case. A1c > 15.1 Meds Adjusted. Pt w  Acute Osteomyelitis. Pt w diabetic foot ulcer. Pt may benefit from MVI w MIN, Vit c, Zn. Wt stable Per Pt.

## 2024-12-19 NOTE — DISCHARGE NOTE PROVIDER - NSDCMRMEDTOKEN_GEN_ALL_CORE_FT
Insulin Lispro: via insulin pump   acetaminophen 325 mg oral tablet: 2 tab(s) orally every 6 hours As needed Temp greater or equal to 38C (100.4F), Mild Pain (1 - 3)  amoxicillin-clavulanate 875 mg-125 mg oral tablet: 1 tab(s) orally every 12 hours  Betadine 10% topical solution: Apply topically to affected area every other day Apply Betadine to left toe wound area, then 4x4 gauze, maru, ACE (Mon/Wed/Fri)  Insulin Pump with Cequer/Simplicity (home med): Continue 5-6 clicks (10-12 units), instructions were given per endocrine dr. Bhagat.  Lidocare Pain Relief Patch 4% topical film: Apply topically to affected area once a day as needed for  mild pain apply to left shoulder and hip for pain  Tresiba 100 units/mL subcutaneous solution: 30 international unit(s) subcutaneous once a day (at bedtime)

## 2024-12-19 NOTE — PROVIDER CONTACT NOTE (CRITICAL VALUE NOTIFICATION) - TEST AND RESULT REPORTED:
Abnormal abscess culture gram + stain dtd 12/17    Few polymorphonuclear leukocytes seen per low power field  Few gram positive cocci in pairs seen per oil powerfield
Tigist T=46.3

## 2024-12-19 NOTE — PROGRESS NOTE ADULT - ASSESSMENT
64 year old female from home ambulates independently, with PMH of  T2DM (on insulin pump) who presented to the ED with a L hallux discomfort  x 3-4d. Admitted for Left hallux diabetic ulcer/cellulitis r/o OM and uncontrolled DM.

## 2024-12-19 NOTE — PROGRESS NOTE ADULT - SUBJECTIVE AND OBJECTIVE BOX
Interval Events:      Allergies    No Known Allergies    Intolerances      Endocrine/Metabolic Medications:  insulin glargine Injectable (LANTUS) 34 Unit(s) SubCutaneous at bedtime  insulin lispro (ADMELOG) corrective regimen sliding scale   SubCutaneous three times a day before meals  insulin lispro (ADMELOG) corrective regimen sliding scale   SubCutaneous at bedtime  insulin lispro Injectable (ADMELOG) 12 Unit(s) SubCutaneous three times a day before meals      Vital Signs Last 24 Hrs  T(C): 37.1 (19 Dec 2024 05:10), Max: 37.1 (19 Dec 2024 05:10)  T(F): 98.8 (19 Dec 2024 05:10), Max: 98.8 (19 Dec 2024 05:10)  HR: 93 (19 Dec 2024 05:10) (88 - 93)  BP: 151/71 (19 Dec 2024 05:10) (125/66 - 151/71)  BP(mean): --  RR: 19 (19 Dec 2024 05:10) (17 - 19)  SpO2: 97% (19 Dec 2024 05:10) (95% - 98%)    Parameters below as of 19 Dec 2024 05:10  Patient On (Oxygen Delivery Method): room air          PHYSICAL EXAM  All physical exam findings normal, except those marked:  General:	Alert, active, cooperative, NAD, well hydrated  .		[] Abnormal:  Neck		Normal: supple, no cervical adenopathy, no palpable thyroid  .		[] Abnormal:  Cardiovascular	Normal: regular rate, normal S1, S2, no murmurs  .		[] Abnormal:  Respiratory	Normal: no chest wall deformity, normal respiratory pattern, CTA B/L  .		[] Abnormal:  Abdominal	Normal: soft, ND, NT, bowel sounds present, no masses, no organomegaly  .		[] Abnormal:  		Normal normal genitalia, testes descended, circumcised/uncircumcised  .		Michael stage:			Breast michael:  .		Menstrual history:  .		[] Abnormal:  Extremities	Normal: FROM x4  .		[] Abnormal:  Skin		Normal: intact and not indurated, no rash, no acanthosis nigricans  .		[] Abnormal:  Neurologic	Normal: grossly intact  .		[] Abnormal:    LABS                        10.7   10.05 )-----------( 308      ( 19 Dec 2024 06:39 )             30.3                               139    |  109    |  12                  Calcium: 8.6   / iCa: x      (12-19 @ 06:39)    ----------------------------<  149       Magnesium: x                                4.0     |  24     |  1.09             Phosphorous: x          CAPILLARY BLOOD GLUCOSE      POCT Blood Glucose.: 144 mg/dL (19 Dec 2024 07:56)  POCT Blood Glucose.: 144 mg/dL (18 Dec 2024 20:47)  POCT Blood Glucose.: 120 mg/dL (18 Dec 2024 16:46)  POCT Blood Glucose.: 165 mg/dL (18 Dec 2024 11:16)        Assesment/plan       Interval Events:  pt in nad    Allergies    No Known Allergies    Intolerances      Endocrine/Metabolic Medications:  insulin glargine Injectable (LANTUS) 34 Unit(s) SubCutaneous at bedtime  insulin lispro (ADMELOG) corrective regimen sliding scale   SubCutaneous three times a day before meals  insulin lispro (ADMELOG) corrective regimen sliding scale   SubCutaneous at bedtime  insulin lispro Injectable (ADMELOG) 12 Unit(s) SubCutaneous three times a day before meals      Vital Signs Last 24 Hrs  T(C): 37.1 (19 Dec 2024 05:10), Max: 37.1 (19 Dec 2024 05:10)  T(F): 98.8 (19 Dec 2024 05:10), Max: 98.8 (19 Dec 2024 05:10)  HR: 93 (19 Dec 2024 05:10) (88 - 93)  BP: 151/71 (19 Dec 2024 05:10) (125/66 - 151/71)  BP(mean): --  RR: 19 (19 Dec 2024 05:10) (17 - 19)  SpO2: 97% (19 Dec 2024 05:10) (95% - 98%)    Parameters below as of 19 Dec 2024 05:10  Patient On (Oxygen Delivery Method): room air          PHYSICAL EXAM  All physical exam findings normal, except those marked:  General:	Alert, active, cooperative, NAD, well hydrated  .		[] Abnormal:  Neck		Normal: supple, no cervical adenopathy, no palpable thyroid  .		[] Abnormal:  Cardiovascular	Normal: regular rate, normal S1, S2, no murmurs  .		[] Abnormal:  Respiratory	Normal: no chest wall deformity, normal respiratory pattern, CTA B/L  .		[] Abnormal:  Abdominal	Normal: soft, ND, NT, bowel sounds present, no masses, no organomegaly  .		[] Abnormal:  		Normal normal genitalia, testes descended, circumcised/uncircumcised  .		Michael stage:			Breast michael:  .		Menstrual history:  .		[] Abnormal:  Extremities	Normal: FROM x4  .		[] Abnormal:  Skin		Normal: intact and not indurated, no rash, no acanthosis nigricans  .		[] Abnormal:  Neurologic	Normal: grossly intact  .		[] Abnormal:    LABS                        10.7   10.05 )-----------( 308      ( 19 Dec 2024 06:39 )             30.3                               139    |  109    |  12                  Calcium: 8.6   / iCa: x      (12-19 @ 06:39)    ----------------------------<  149       Magnesium: x                                4.0     |  24     |  1.09             Phosphorous: x          CAPILLARY BLOOD GLUCOSE      POCT Blood Glucose.: 144 mg/dL (19 Dec 2024 07:56)  POCT Blood Glucose.: 144 mg/dL (18 Dec 2024 20:47)  POCT Blood Glucose.: 120 mg/dL (18 Dec 2024 16:46)  POCT Blood Glucose.: 165 mg/dL (18 Dec 2024 11:16)        Assesment/plan    Patient is a 64 year old female from home ambulates independently, with PMH of  T2DM (on insulin pump) who presented to the ED with a L hallux x 3-4d.   Endocrine consulted for dm management. Pt takes novolog via Cequer/ Simplicity without basal insulin. Chacks fsg 200-300s usually. Denies hypoglycemia. F/u with endo- Dr. Grace.         Problem/Recommendation - 1:  ·  Problem: DM (diabetes mellitus).   ·  Recommendation: uncontrolled with hyperglycemia  cont lantus 34 units- change to treseba upon d/c  and admelog to 12 ac tid- cange to Cequer/simplicity- 6 clicks each meal   a1c->15%!  compliance d/w pt  fsg ac and hs  d/w pt need for basal/bolus insulin  nutrition eval  d/w prim team     Problem/Recommendation - 2:  ·  Problem: Diabetic foot ulcer.   ·  Recommendation: cont iv bax  tx per podiatry and ID.

## 2024-12-19 NOTE — PROGRESS NOTE ADULT - PROBLEM SELECTOR PLAN 1
12/17 MR left foot shows early OM  Podiatry and ID following  Continue with  Tylenol PRN for pain, NWB to LLE  Upon discharge, patient to follow up in outpatient clinic within 1 week:   [facility]  [address] [facility] [address] [facility] [address] [place] [address]  P: [phone]

## 2024-12-19 NOTE — DISCHARGE NOTE PROVIDER - ATTENDING DISCHARGE PHYSICAL EXAMINATION:
CONSTITUTIONAL: Well appearing, well nourished, awake, alert and in no apparent distress  CARDIAC: Normal rate, regular rhythm.  Heart sounds S1, S2.  No murmurs, rubs or gallops   GI: abd soft, non tender, BS present  RESPIRATORY: Breath sounds clear and equal bilaterally. No wheezes, rhales or rhonchi  MUSCULOSKELETAL: Spine appears normal, range of motion is not limited, no muscle or joint tenderness  EXTREMITIES: b/l feet non tender no swelling or open wound  NEUROLOGICAL: Alert and oriented, no focal deficits, no motor or sensory deficits.  SKIN: No rash, skin turgor

## 2024-12-19 NOTE — DISCHARGE NOTE PROVIDER - NSFOLLOWUPCLINICS_GEN_ALL_ED_FT
[facility]  Podiatry/Wound Care  [address] [place] [address]  Phone: [phone]  Fax: [phone]  Follow Up Time: 1 week

## 2024-12-19 NOTE — PROGRESS NOTE ADULT - SUBJECTIVE AND OBJECTIVE BOX
64y Female is under our care for     MEDS:  vancomycin  IVPB 1000 milliGRAM(s) IV Intermittent every 12 hours    ALLERGIES: Allergies    No Known Allergies    Intolerances        REVIEW OF SYSTEMS:  [  ] Not able to elicit  General: no fevers no malaise  Chest: no cough no sob  GI: no nvd  : no urinary sxs   Skin: no rashes  Musculoskeletal: left hip pain   Neuro: no ha's no dizziness     VITALS:  Vital Signs Last 24 Hrs  T(C): 37.1 (19 Dec 2024 05:10), Max: 37.1 (19 Dec 2024 05:10)  T(F): 98.8 (19 Dec 2024 05:10), Max: 98.8 (19 Dec 2024 05:10)  HR: 93 (19 Dec 2024 05:10) (88 - 93)  BP: 151/71 (19 Dec 2024 05:10) (125/66 - 151/71)  BP(mean): --  RR: 19 (19 Dec 2024 05:10) (17 - 19)  SpO2: 97% (19 Dec 2024 05:10) (95% - 98%)    Parameters below as of 19 Dec 2024 05:10  Patient On (Oxygen Delivery Method): room air    PHYSICAL EXAM:      LABS/DIAGNOSTIC TESTS:                        10.7   10.05 )-----------( 308      ( 19 Dec 2024 06:39 )             30.3     WBC Count: 10.05 K/uL (12-19 @ 06:39)  WBC Count: 9.95 K/uL (12-18 @ 05:15)  WBC Count: 9.46 K/uL (12-17 @ 04:15)  WBC Count: 10.34 K/uL (12-16 @ 07:14)  WBC Count: 9.79 K/uL (12-15 @ 06:12)    12-19    139  |  109[H]  |  12  ----------------------------<  149[H]  4.0   |  24  |  1.09    Ca    8.6      19 Dec 2024 06:39    CULTURES:   .Abscess  12-17 @ 17:00   No growth to date.  --    Few polymorphonuclear leukocytes seen per low power field  Few Gram positive cocci in pairs seen per oil power field    .Blood BLOOD  12-12 @ 15:40   No growth at 5 days  --  --    .Blood BLOOD  12-12 @ 15:35   No growth at 5 days  --  --    RADIOLOGY:   64y Female sitting up in the chair with no overnight events. Denies any foot pain but c.o of mild left hip aching pain, her left foot is dressed and her right breast packing was removed today.  Denies nausea, vomiting or diarrhea. No fevers or chills.    MEDS:  vancomycin  IVPB 1000 milliGRAM(s) IV Intermittent every 12 hours    ALLERGIES: Allergies    No Known Allergies    Intolerances    REVIEW OF SYSTEMS:  [  ] Not able to elicit  General: no fevers no malaise  Chest: no cough no sob  GI: no nvd  : no urinary sxs   Skin: no rashes  Musculoskeletal: left hip pain   Neuro: no ha's no dizziness     VITALS:  Vital Signs Last 24 Hrs  T(C): 37.1 (19 Dec 2024 05:10), Max: 37.1 (19 Dec 2024 05:10)  T(F): 98.8 (19 Dec 2024 05:10), Max: 98.8 (19 Dec 2024 05:10)  HR: 93 (19 Dec 2024 05:10) (88 - 93)  BP: 151/71 (19 Dec 2024 05:10) (125/66 - 151/71)  BP(mean): --  RR: 19 (19 Dec 2024 05:10) (17 - 19)  SpO2: 97% (19 Dec 2024 05:10) (95% - 98%)    Parameters below as of 19 Dec 2024 05:10  Patient On (Oxygen Delivery Method): room air    PHYSICAL EXAM:  HEENT: normocephalic, conjunctivae and sclerae clear; moist mucous membranes  Neck: supple no LN's   Respiratory: lungs clear no rales  Cardiovascular: S1 S2 reg no murmurs  Gastrointestinal: +BS with soft, nondistended abdomen; nontender  Extremities: Left foot erythema and edema decreasing, +2 pitting edema of LLE   Skin: As above; Right under breast dressed   Ortho: no erythema or joint swelling  Neuro: AAO x 3    LABS/DIAGNOSTIC TESTS:                        10.7   10.05 )-----------( 308      ( 19 Dec 2024 06:39 )             30.3     WBC Count: 10.05 K/uL (12-19 @ 06:39)  WBC Count: 9.95 K/uL (12-18 @ 05:15)  WBC Count: 9.46 K/uL (12-17 @ 04:15)  WBC Count: 10.34 K/uL (12-16 @ 07:14)  WBC Count: 9.79 K/uL (12-15 @ 06:12)    12-19    139  |  109[H]  |  12  ----------------------------<  149[H]  4.0   |  24  |  1.09    Ca    8.6      19 Dec 2024 06:39    CULTURES:   .Abscess  12-17 @ 17:00   No growth to date.  --    Few polymorphonuclear leukocytes seen per low power field  Few Gram positive cocci in pairs seen per oil power field    .Blood BLOOD  12-12 @ 15:40   No growth at 5 days  --  --    .Blood BLOOD  12-12 @ 15:35   No growth at 5 days  --  --    RADIOLOGY:   64y Female sitting up in the chair with no overnight events. Denies any foot pain but c.o of mild left hip aching pain, her left foot is dressed and her right breast packing was removed today.  Denies nausea, vomiting or diarrhea. No fevers or chills. Her breast abscess cultures are negative to date.    MEDS:  vancomycin  IVPB 1000 milliGRAM(s) IV Intermittent every 12 hours    ALLERGIES: Allergies    No Known Allergies    Intolerances    REVIEW OF SYSTEMS:  [  ] Not able to elicit  General: no fevers no malaise  Chest: no cough no sob  GI: no nvd  : no urinary sxs   Skin: no rashes  Musculoskeletal: left hip pain   Neuro: no ha's no dizziness     VITALS:  Vital Signs Last 24 Hrs  T(C): 37.1 (19 Dec 2024 05:10), Max: 37.1 (19 Dec 2024 05:10)  T(F): 98.8 (19 Dec 2024 05:10), Max: 98.8 (19 Dec 2024 05:10)  HR: 93 (19 Dec 2024 05:10) (88 - 93)  BP: 151/71 (19 Dec 2024 05:10) (125/66 - 151/71)  BP(mean): --  RR: 19 (19 Dec 2024 05:10) (17 - 19)  SpO2: 97% (19 Dec 2024 05:10) (95% - 98%)    Parameters below as of 19 Dec 2024 05:10  Patient On (Oxygen Delivery Method): room air    PHYSICAL EXAM:  HEENT: normocephalic, conjunctivae and sclerae clear; moist mucous membranes  Neck: supple no LN's   Respiratory: lungs clear no rales  Cardiovascular: S1 S2 reg no murmurs  Gastrointestinal: +BS with soft, nondistended abdomen; nontender  Extremities: Left foot erythema and edema decreasing, +2 pitting edema of LLE   Skin: As above; Right under breast dressed   Ortho: no erythema or joint swelling  Neuro: AAO x 3    LABS/DIAGNOSTIC TESTS:                        10.7   10.05 )-----------( 308      ( 19 Dec 2024 06:39 )             30.3     WBC Count: 10.05 K/uL (12-19 @ 06:39)  WBC Count: 9.95 K/uL (12-18 @ 05:15)  WBC Count: 9.46 K/uL (12-17 @ 04:15)  WBC Count: 10.34 K/uL (12-16 @ 07:14)  WBC Count: 9.79 K/uL (12-15 @ 06:12)    12-19    139  |  109[H]  |  12  ----------------------------<  149[H]  4.0   |  24  |  1.09    Ca    8.6      19 Dec 2024 06:39    CULTURES:   .Abscess  12-17 @ 17:00   No growth to date.  --    Few polymorphonuclear leukocytes seen per low power field  Few Gram positive cocci in pairs seen per oil power field    .Blood BLOOD  12-12 @ 15:40   No growth at 5 days  --  --    .Blood BLOOD  12-12 @ 15:35   No growth at 5 days  --  --    RADIOLOGY:

## 2024-12-19 NOTE — DIETITIAN INITIAL EVALUATION ADULT - PROBLEM SELECTOR PLAN 2
on insulin lispro pump at home  UA Glucosuria, CMP glc of 469, last known a1c 10%  start 14 lantus, 5 TID based on 0.4mg/kg  c/w ISS  POCTs ACHS  f/u a1c  consult Endo in the AM

## 2024-12-19 NOTE — DIETITIAN INITIAL EVALUATION ADULT - PERTINENT LABORATORY DATA
12-19    139  |  109[H]  |  12  ----------------------------<  149[H]  4.0   |  24  |  1.09    Ca    8.6      19 Dec 2024 06:39    POCT Blood Glucose.: 144 mg/dL (12-19-24 @ 07:56)  A1C with Estimated Average Glucose Result: >15.5 % (12-13-24 @ 06:49)

## 2024-12-19 NOTE — PROGRESS NOTE ADULT - ASSESSMENT
Left foot and leg Cellulitis - improving slowly  Right breast abscess - s/p I&D  Leukocytosis - normalized    Plan -   ·	Cont Vancomycin 1gm iv q12hrs   ·	awaiting breast abscess culture results.  ·	will plan to DC home on Friday on PO antibiotics once we have breast abscess culture results.  ·	Vanco T 46.3 obtained while vanco was infusing; will recheck levels at 5pm today Left foot and leg Cellulitis - improving slowly  Right breast abscess - s/p I&D  Leukocytosis - normalized    Plan -   ·	Cont Vancomycin 1gm iv q12hrs   ·	awaiting breast abscess culture results.  ·	will plan to DC home on Friday on PO antibiotics once we have breast abscess culture results.  ·	Vanco T 46.3 obtained while Vanco was infusing; will recheck levels at 5pm today Left foot and leg Cellulitis - improving slowly  Right breast abscess - s/p I&D  Leukocytosis - normalized    Plan -   ·	Cont Vancomycin 1gm iv q12hrs   ·	awaiting breast abscess culture results.  ·	will plan to DC home on Friday on Doxycycline 100mgs po bid x 10 days  ·	Vanco T 46.3 obtained while Vanco was infusing; will recheck levels at 5pm today

## 2024-12-19 NOTE — PROGRESS NOTE ADULT - SUBJECTIVE AND OBJECTIVE BOX
NP Note discussed with  Primary Attending    Patient is a 64y old  Female who presents with a chief complaint of L hallux injury (19 Dec 2024 09:02)      INTERVAL HPI/OVERNIGHT EVENTS: Patient OOB-chair in NAD. Denies chest pain, dizziness, SOB or palpitations. Reports feeling better. Tolerating diet. No fever or chills.     MEDICATIONS  (STANDING):  enoxaparin Injectable 40 milliGRAM(s) SubCutaneous every 24 hours  insulin glargine Injectable (LANTUS) 34 Unit(s) SubCutaneous at bedtime  insulin lispro (ADMELOG) corrective regimen sliding scale   SubCutaneous three times a day before meals  insulin lispro (ADMELOG) corrective regimen sliding scale   SubCutaneous at bedtime  insulin lispro Injectable (ADMELOG) 12 Unit(s) SubCutaneous three times a day before meals  vancomycin  IVPB 1000 milliGRAM(s) IV Intermittent every 12 hours    MEDICATIONS  (PRN):  acetaminophen     Tablet .. 650 milliGRAM(s) Oral every 6 hours PRN Temp greater or equal to 38C (100.4F), Mild Pain (1 - 3)      __________________________________________________  REVIEW OF SYSTEMS:    CONSTITUTIONAL: No fever,   EYES: no acute visual disturbances  NECK: No pain or stiffness  RESPIRATORY: No cough; No shortness of breath  CARDIOVASCULAR: No chest pain, no palpitations  GASTROINTESTINAL: No pain. No nausea or vomiting; No diarrhea   NEUROLOGICAL: No headache or numbness, no tremors  MUSCULOSKELETAL: No joint pain, no muscle pain  GENITOURINARY: no dysuria, no frequency, no hesitancy  PSYCHIATRY: no depression , no anxiety  ALL OTHER  ROS negative        Vital Signs Last 24 Hrs  T(C): 37.1 (19 Dec 2024 05:10), Max: 37.1 (19 Dec 2024 05:10)  T(F): 98.8 (19 Dec 2024 05:10), Max: 98.8 (19 Dec 2024 05:10)  HR: 93 (19 Dec 2024 05:10) (88 - 93)  BP: 151/71 (19 Dec 2024 05:10) (125/66 - 151/71)  BP(mean): --  RR: 19 (19 Dec 2024 05:10) (17 - 19)  SpO2: 97% (19 Dec 2024 05:10) (95% - 98%)    Parameters below as of 19 Dec 2024 05:10  Patient On (Oxygen Delivery Method): room air        ________________________________________________  PHYSICAL EXAM:  GENERAL: NAD  HEENT: Normocephalic;  conjunctivae and sclerae clear; moist mucous membranes;   NECK : supple  CHEST/LUNG: Clear to auscultation bilaterally with good air entry   HEART: S1 S2  regular; no murmurs, gallops or rubs  ABDOMEN: Soft, Nontender, Nondistended; Bowel sounds present  EXTREMITIES: no cyanosis; no edema; no calf tenderness  SKIN: warm and dry; no rash  NERVOUS SYSTEM:  Awake and alert; Oriented  to place, person and time ; no new deficits    _________________________________________________  LABS:                        10.7   10.05 )-----------( 308      ( 19 Dec 2024 06:39 )             30.3     12-19    139  |  109[H]  |  12  ----------------------------<  149[H]  4.0   |  24  |  1.09    Ca    8.6      19 Dec 2024 06:39        Urinalysis Basic - ( 19 Dec 2024 06:39 )    Color: x / Appearance: x / SG: x / pH: x  Gluc: 149 mg/dL / Ketone: x  / Bili: x / Urobili: x   Blood: x / Protein: x / Nitrite: x   Leuk Esterase: x / RBC: x / WBC x   Sq Epi: x / Non Sq Epi: x / Bacteria: x      CAPILLARY BLOOD GLUCOSE      POCT Blood Glucose.: 144 mg/dL (19 Dec 2024 07:56)  POCT Blood Glucose.: 144 mg/dL (18 Dec 2024 20:47)  POCT Blood Glucose.: 120 mg/dL (18 Dec 2024 16:46)  POCT Blood Glucose.: 165 mg/dL (18 Dec 2024 11:16)        RADIOLOGY & ADDITIONAL TESTS:    Imaging Personally Reviewed:  YES/NO    Consultant(s) Notes Reviewed:   YES/ No    Care Discussed with Consultants :     Plan of care was discussed with patient and /or primary care giver; all questions and concerns were addressed and care was aligned with patient's wishes.

## 2024-12-19 NOTE — DISCHARGE NOTE PROVIDER - PROVIDER TOKENS
PROVIDER:[TOKEN:[[id number]],FOLLOWUP:[1 week]],PROVIDER:[TOKEN:[[id number]],FOLLOWUP:[1 week]]

## 2024-12-19 NOTE — PROGRESS NOTE ADULT - PROBLEM SELECTOR PLAN 5
awaiting breast abscess culture results   DC home Friday on PO antibiotics once we have breast abscess culture results

## 2024-12-19 NOTE — PROGRESS NOTE ADULT - PROBLEM SELECTOR PLAN 3
s/p I&D 12/17 s/p I&D 12/17  packing removed and gauze/tape placed under R breast by sx today  Will need breast imaging in outpatient setting  Upon discharge to follow  up with Dr. Webster

## 2024-12-19 NOTE — DISCHARGE NOTE PROVIDER - CARE PROVIDER_API CALL
Bernice Bhagat.  Endocrinology/Metab/Diabetes  [address] [place] [address]  Phone: [phone]  Fax: [phone]  Follow Up Time: 1 week    Master Webster  [address], [address]  [place] [address]  Phone: [phone]  Fax: [phone]  Follow Up Time: 1 week

## 2024-12-19 NOTE — DIETITIAN INITIAL EVALUATION ADULT - PERTINENT MEDS FT
MEDICATIONS  (STANDING):  enoxaparin Injectable 40 milliGRAM(s) SubCutaneous every 24 hours  insulin glargine Injectable (LANTUS) 34 Unit(s) SubCutaneous at bedtime  insulin lispro (ADMELOG) corrective regimen sliding scale   SubCutaneous three times a day before meals  insulin lispro (ADMELOG) corrective regimen sliding scale   SubCutaneous at bedtime  insulin lispro Injectable (ADMELOG) 12 Unit(s) SubCutaneous three times a day before meals  vancomycin  IVPB 1000 milliGRAM(s) IV Intermittent every 12 hours    MEDICATIONS  (PRN):  acetaminophen     Tablet .. 650 milliGRAM(s) Oral every 6 hours PRN Temp greater or equal to 38C (100.4F), Mild Pain (1 - 3)

## 2024-12-19 NOTE — PROGRESS NOTE ADULT - PROBLEM SELECTOR PLAN 4
cont lantus 34 units- change to treseba upon d/c   admelog to 12 ac tid- change to Cequer/simplicity  a1c->15%  c/w FS monitoring   ISS

## 2024-12-19 NOTE — PROGRESS NOTE ADULT - SUBJECTIVE AND OBJECTIVE BOX
INTERVAL HPI/OVERNIGHT EVENTS:  Pt seen/examined at bedside. Tolerated packing removal.     Vital Signs Last 24 Hrs  T(C): 37.1 (19 Dec 2024 05:10), Max: 37.1 (19 Dec 2024 05:10)  T(F): 98.8 (19 Dec 2024 05:10), Max: 98.8 (19 Dec 2024 05:10)  HR: 93 (19 Dec 2024 05:10) (88 - 93)  BP: 151/71 (19 Dec 2024 05:10) (125/66 - 151/71)  BP(mean): --  RR: 19 (19 Dec 2024 05:10) (17 - 19)  SpO2: 97% (19 Dec 2024 05:10) (95% - 98%)    Parameters below as of 19 Dec 2024 05:10  Patient On (Oxygen Delivery Method): room air      I&O's Detail      Medications:  vancomycin  IVPB 1000 milliGRAM(s) IV Intermittent every 12 hours      Physical Exam:  General: AAOx3, No acute distress  HEENT: NC/AT, trachea midline  Respiratory: Nonlabored breathing, equal chest rise b/l   Chest: R inferior breast packing removed and redressed with gauze and tape    Drains/Tubes:       Labs:                        10.7   10.05 )-----------( 308      ( 19 Dec 2024 06:39 )             30.3     12-19    139  |  109[H]  |  12  ----------------------------<  149[H]  4.0   |  24  |  1.09    Ca    8.6      19 Dec 2024 06:39          RADIOLOGY & ADDITIONAL STUDIES:

## 2024-12-19 NOTE — DISCHARGE NOTE PROVIDER - NSDCCPCAREPLAN_GEN_ALL_CORE_FT
PRINCIPAL DISCHARGE DIAGNOSIS  Diagnosis: Diabetic foot ulcer  Assessment and Plan of Treatment: You were admitted for Left hallux diabetic ulcer/cellulitis r/o OM and uncontrolled DM.   Problem/Plan - 1:  ·  Problem: Diabetic foot ulcer.   ·  Plan: 12/17 MR left foot shows early OM  Podiatry and ID following  Continue with  Tylenol PRN for pain, NWB to LLE  Upon discharge, patient to follow up in outpatient clinic within 1 week:   [facility]  [address] [place] [address]  P: [phone].  Take all of your antibiotics as ordered.  Call your Health Care Provider within two days of arriving home to make a follow up appointment within one week.  If the affected cellulitic area increases in redness, warmth, pain or swelling call your Health Care Provider.  If you develop fever, chills, and/or malaise, call your Health Care Provider.        SECONDARY DISCHARGE DIAGNOSES  Diagnosis: Osteomyelitis of ankle or foot, acute  Assessment and Plan of Treatment: Early Charcot arthropathy at the midfoot.  -   Podiatry following, c/w local tx  -   ID:  Dr Mckenzie following,  f/u final ID reccs   -   Continue with Vancomycin 1 gram Q12 hours; Vanco T 46.3 obtained while vanco was infusing; will recheck levels at 5pm today  CHARMAINE/PVR -  + evidence for disease affecting the small arteries of the left foot.      Diagnosis: Abscess of right breast  Assessment and Plan of Treatment: Abscess of right breast.   ·  Plan: s/p I&D 12/17  packing removed and gauze/tape placed under R breast by sx today  Will need breast imaging in outpatient setting  Upon discharge to follow  up with Dr. Webster.      Diagnosis: DM (diabetes mellitus)  Assessment and Plan of Treatment: DM (diabetes mellitus).   ·  Plan: cont lantus 34 units- change to treseba upon d/c   admelog to 12 ac tid- change to Cequer/simplicity  a1c->15%  c/w FS monitoring   ISS.      Diagnosis: Hyponatremia  Assessment and Plan of Treatment: RESOLVED     PRINCIPAL DISCHARGE DIAGNOSIS  Diagnosis: Diabetic foot ulcer  Assessment and Plan of Treatment: You were admitted for Left hallux diabetic ulcer/cellulitis and uncontrolled DM.  You were followed by infectious disease doctor and podiatry.   MR foot shows   You were treated with IV antibiotic.   Non weight bearing to left lower leg   continue antibiotic as prescribed.   continue pain medications as prescribed.   Upon discharge, patient to follow up in outpatient clinic within 1 week:   [facility]  [address] [place] [address]  P: [phone].  Take all of your antibiotics as ordered.  Call your Health Care Provider within two days of arriving home to make a follow up appointment within one week.  If the affected  area increases in redness, warmth, pain or swelling call your Health Care Provider.  If you develop fever, chills, and/or malaise, call your Health Care Provider.        SECONDARY DISCHARGE DIAGNOSES  Diagnosis: Osteomyelitis of ankle or foot, acute  Assessment and Plan of Treatment: Early Charcot arthropathy at the midfoot.  -   Podiatry following, c/w local tx  -   ID:  Dr Mckenzie following,  f/u final ID reccs   -   Continue with Vancomycin 1 gram Q12 hours; Vanco T 46.3 obtained while vanco was infusing; will recheck levels at 5pm today  CHARMAINE/PVR -  + evidence for disease affecting the small arteries of the left foot.      Diagnosis: Abscess of right breast  Assessment and Plan of Treatment: you were followed by surgery team for Abscess of right breast.   ·  Plan: s/p I&D 12/17  packing removed and gauze/tape placed under R breast by sx today  Will need breast imaging in outpatient setting  Upon discharge to follow  up with Dr. Webster.      Diagnosis: DM (diabetes mellitus)  Assessment and Plan of Treatment: DM (diabetes mellitus).   ·  Plan: cont lantus 34 units- change to treseba upon d/c   admelog to 12 ac tid- change to Cequer/simplicity  a1c->15%  c/w FS monitoring   ISS.      Diagnosis: Hyponatremia  Assessment and Plan of Treatment: RESOLVED     PRINCIPAL DISCHARGE DIAGNOSIS  Diagnosis: Diabetic foot ulcer  Assessment and Plan of Treatment: You were admitted for Left hallux diabetic ulcer/cellulitis and uncontrolled DM.  You were followed by infectious disease doctor and podiatry.   MR foot shows no Osteomyelitis of distal phalanx appreciated, early Charcot arthropathy to left midfoot.  You were treated with IV antibiotic.   -Non weight bearing to left lower leg   -Take all of your antibiotics as ordered :   Augmentin 875mg twice per day x 10 days.   -continue pain medications as prescribed.   -Upon discharge, patient to follow up in outpatient clinic within 1 week:   [facility]  [address] [place] [address]  P: [phone].  -Wound care order : apply Betadine, 4x4 gauze, maru, ACE (M/W/F)   Call your Health Care Provider within two days of arriving home to make a follow up appointment within one week.  If the affected  area increases in redness, warmth, pain or swelling call your Health Care Provider.  If you develop fever, chills, and/or malaise, call your Health Care Provider.        SECONDARY DISCHARGE DIAGNOSES  Diagnosis: Charcot arthropathy  Assessment and Plan of Treatment: MRI of L foot results reviewed with podiatry - no Osteomyelitis of distal phalanx appreciated, early Charcot arthropathy to left midfoot.  It is very important of proper glycemic control, daily foot examinations, and wearing proper shoe gear.  You were inquired about surgical reconstructive options for Charcot as per podiatry ; however, it was discussed with you that her A1c levels need to controlled, so that can be considered in the future outpatient if she remains compliant with taking care of her diabetes as per podiatry.  Wound care order, continue applying Betadine, 4x4 gauze, maru, ACE (M/W/F)    Follow up with podiatry upon discharge as well as primary MD.    Diagnosis: Abscess of right breast  Assessment and Plan of Treatment: you were followed by surgery team for Abscess of right breast.   You had incision and drainage on 12/17. Surgery team removed Packing and applied gauze/tape. Abscess cultures showed  Finegoldia magna. ID recommends to complete Augmentin 875mg twice per day x 10 days. You will need breast imaging in outpatient setting as per surgery. Continue follow up care with Surgeon dr. Webster, call for an appointment.        Diagnosis: DM (diabetes mellitus)  Assessment and Plan of Treatment: You were seen by Endocrine doctor for uncontrolled DM.   A1C > 15%.   You were treated with insulin as per Endocrine.   Contnue Treseba 30 units at bedtime and Cequer/Simpicity 5-6 clicks (10-12units, instructions provided by endocrine dr. Bhagat) as per recommendation.   Continue follow up care with endocrine Bernice Cool in one week upon discharge. Call for an appointment.   Make sure you get your HgA1c checked every three months.  If you take oral diabetes medications, check your blood glucose two times a day.  If you take insulin, check your blood glucose before meals and at bedtime.  It's important not to skip any meals.  Keep a log of your blood glucose results and always take it with you to your doctor appointments.  Keep a list of your current medications including injectables and over the counter medications and bring this medication list with you to all your doctor appointments.  If you have not seen your ophthalmologist this year call for appointment.  Check your feet daily for redness, sores, or openings. Do not self treat. If no improvement in two days call your primary care physician for an appointment.  Low blood sugar (hypoglycemia) is a blood sugar below 70mg/dl. Check your blood sugar if you feel signs/symptoms of hypoglycemia. If your blood sugar is below 70 take 15 grams of carbohydrates (ex 4 oz of apple juice, 3-4 glucose tablets, or 4-6 oz of regular soda) wait 15 minutes and repeat blood sugar to make sure it comes up above 70.  If your blood sugar is above 70 and you are due for a meal, have a meal.  If you are not due for a meal have a snack.  This snack helps keeps your blood sugar at a safe range.      Diagnosis: Hyponatremia  Assessment and Plan of Treatment: You were monitored for low sodium level likely due to uncontrolled diabetes. Now RESOLVED

## 2024-12-19 NOTE — PROGRESS NOTE ADULT - NS ATTEND AMEND GEN_ALL_CORE FT
63 yo F with PMHx of uncontrolled IDDM p/w for worsening foot wound concerning for infection on L foot, admitted for diabetic foot infection c/f OM with overlying cellulitis course c/b R breast tenderness, swelling now pending work up.      This morning patient      Vitals, labs, imaging reviewed. Afebrile, VSS, no leukocytosis                         10.7   10.05 )-----------( 308      ( 19 Dec 2024 06:39 )             30.3   139  |  109[H]  |  12  ----------------------------( 149[H]     12-19 @ 06:39  4.0   |  24  |  1.09    Ca: 8.6   Phos: x    Mg: x     TPro: x  / Alb: x  /  TBili x  / DBili x  /  AST x  /  ALT x  /  AlkPhos  x     Exam:   Nontoxic appearing female, NAD   RRR   Lungs CTA b/l R breast redness mild tenderness on palpation    Abdo soft NTD   RLE wnl   LLE with great toe 2cm cut with small area behind the tip of toe, dressed with betadine, wrapped in ace wrap, L foot dorsum and ankle with warmth, erythema, decreasing in comparison, 3cm region under the R breast with warmth, ttp and mild fluctuance     A/P:   #Left Foot great toe ulceration with overlying cellulitis    #R/o OM    #Diabetic foot infection   #Uncontrolled DM with hyperglycemia    #Charcot arthropathy   #R breast pustule vs area of abscess     -US showing collection s/p I&D on 12/17, packing to be removed today area looking good/clean    -c/w vancomycin, discontinued zosyn 12/16 adjust per vanco trough  -ID following, Dr. Mckenzie, f/u recs on duration and PO abx f/u sensitivities   -f/u BCx- NGTD   -podiatry following, appreciate recs, NWB to L foot    -MRI L foot noted with reactive osseous changes with early osteomyelitis also a consideration within the differential diagnosis. Early Charcot arthropathy at the midfoot.   -noted a1c >15, endo consulted, appreciate recs from Dr Bhagat   -surgery consulted for need for incision and drainage of R breast ?abscess   -pain control prn    -nutrition eval   -dvt ppx   -will need full breast imaging outpatient with US or Mammo  -plan for dispo tomorrow (12/20

## 2024-12-19 NOTE — DIETITIAN INITIAL EVALUATION ADULT - PROBLEM SELECTOR PLAN 1
p/w LLE wound, TTP, edema, erythema, VSS in the ED  WBC 12K, Lactate 1.6,   s/p doxycycline 2d and mupirocin at home, 1L in the ED  start zosyn and vanco  c/w Tylenol PRN for pain, NWB to LLE  f/u BCx  f/u MR L foot  f/u Wound Cx  f/u podiatry reccs   f/u ID reccs

## 2024-12-19 NOTE — DISCHARGE NOTE PROVIDER - NSDCFUADDAPPT_GEN_ALL_CORE_FT
APPTS ARE READY TO BE MADE: [X] YES    Best Family or Patient Contact (if needed):    Additional Information about above appointments (if needed):    1:   2:   3:     Other comments or requests:    APPTS ARE READY TO BE MADE: [X] YES    Best Family or Patient Contact (if needed):    Additional Information about above appointments (if needed):    1: PCP  2: Endocrine f/u dr. Bhagat   3: Surgery f/u dr. Webster    Other comments or requests:    APPTS ARE READY TO BE MADE: [X] YES    Best Family or Patient Contact (if needed):    Additional Information about above appointments (if needed):    1: PCP, pt wishes to find on her own   2 : Podiatry follow up outpatient clinic within 1 week:   [facility], [address] [place] [address] [address] [place] [address], P: [phone]  3: Endocrine f/u dr. Bhagat   4: Surgery f/u dr. Webster      APPTS ARE READY TO BE MADE: [X] YES    Best Family or Patient Contact (if needed):    Additional Information about above appointments (if needed):    1: PCP, pt wishes to find on her own   2 : Podiatry follow up outpatient clinic within 1 week:   [facility], [address] [place] [address], P: [phone]  3: Endocrine f/u dr. Bhagat   4: Surgery f/u dr. Webster     Met with patient face to face and provided the patient with provider referral information for Endocrinology with Dr. Bhagat, however patient prefers to schedule the appointments on their own.     Met with patient face to face and they were provided with referral details for a [facility] provider for Surgery with Dr. Webster and also has it on discharge paperwork, and will coordinate the appointment on their own.    Met with patient face to face and they were provided with referral details for a [facility] provider for Podiatry and also has it on discharge paperwork, and will coordinate the appointment on their own.    Met with patient face to face and mentioned the note there for obtaining a new PCP, however patient prefers to schedule the appointments on their own.

## 2024-12-19 NOTE — PROGRESS NOTE ADULT - ASSESSMENT
65 y/o female p/w DM foot ulcer found to have right breast lesion at 6 o'clock now s/p bedside I&D 12/17  VSS, afebrile, no leukocytosis    - packing removed and gauze/tape placed under R breast   - f/u culture results  - Antibiotics per ID  - Will need breast imaging in outpatient setting  - Upon discharge, please call office to schedule appointment - pt to follow  up with Dr. Webster   - strict glycemic control   - remainder of care as per primary team  - surgery to sign off, reconsult as needed    follow up information: please include in dc summary   Master Webster MD  Breast Surgery Attending.   63 y/o female p/w DM foot ulcer found to have right breast lesion at 6 o'clock now s/p bedside I&D 12/17  VSS, afebrile, no leukocytosis    - packing removed and gauze/tape placed under R breast   - f/u culture results  - Antibiotics per ID  - Will need breast imaging in outpatient setting  - Upon discharge, please call office to schedule appointment - pt to follow  up with Dr. Webster   - strict glycemic control   - remainder of care as per primary team  - surgery to sign off, reconsult as needed    follow up information: please include in dc summary   Master Webster MD  Office #: [phone]  Breast Surgery Attending.

## 2024-12-19 NOTE — PROGRESS NOTE ADULT - NS ATTEND AMEND GEN_ALL_CORE FT
I examined the patient myself and have made amendments to this note where necessary. I agree with the stated assessment and plan.

## 2024-12-19 NOTE — PROGRESS NOTE ADULT - PROBLEM SELECTOR PLAN 2
Early Charcot arthropathy at the midfoot.  -   Podiatry following, c/w local tx  -   ID:  Dr Mckenzie following,  f/u final ID reccs   -   Continue with Vancomycin 1 gram Q12 hours; Held today for Trough of 30--> rpt in am  -   -   CHARMAINE/PVR -  + evidence for disease affecting the small arteries of the left foot. Early Charcot arthropathy at the midfoot.  -   Podiatry following, c/w local tx  -   ID:  Dr Mckenzie following,  f/u final ID reccs   -   Continue with Vancomycin 1 gram Q12 hours; Vanco T 46.3 obtained while vanco was infusing; will recheck levels at 5pm today  CHARMAINE/PVR -  + evidence for disease affecting the small arteries of the left foot.

## 2024-12-19 NOTE — PROGRESS NOTE ADULT - PROBLEM/PLAN-3
DISPLAY PLAN FREE TEXT
1

## 2024-12-20 ENCOUNTER — TRANSCRIPTION ENCOUNTER (OUTPATIENT)
Age: 64
End: 2024-12-20

## 2024-12-20 VITALS — TEMPERATURE: 99 F

## 2024-12-20 LAB
ANION GAP SERPL CALC-SCNC: 5 MMOL/L — SIGNIFICANT CHANGE UP (ref 5–17)
BUN SERPL-MCNC: 13 MG/DL — SIGNIFICANT CHANGE UP (ref 7–18)
CALCIUM SERPL-MCNC: 8.8 MG/DL — SIGNIFICANT CHANGE UP (ref 8.4–10.5)
CHLORIDE SERPL-SCNC: 109 MMOL/L — HIGH (ref 96–108)
CO2 SERPL-SCNC: 26 MMOL/L — SIGNIFICANT CHANGE UP (ref 22–31)
CREAT SERPL-MCNC: 1.19 MG/DL — SIGNIFICANT CHANGE UP (ref 0.5–1.3)
EGFR: 51 ML/MIN/1.73M2 — LOW
GLUCOSE BLDC GLUCOMTR-MCNC: 212 MG/DL — HIGH (ref 70–99)
GLUCOSE BLDC GLUCOMTR-MCNC: 255 MG/DL — HIGH (ref 70–99)
GLUCOSE SERPL-MCNC: 156 MG/DL — HIGH (ref 70–99)
HCT VFR BLD CALC: 31.5 % — LOW (ref 34.5–45)
HGB BLD-MCNC: 11.2 G/DL — LOW (ref 11.5–15.5)
MCHC RBC-ENTMCNC: 26.6 PG — LOW (ref 27–34)
MCHC RBC-ENTMCNC: 35.6 G/DL — SIGNIFICANT CHANGE UP (ref 32–36)
MCV RBC AUTO: 74.8 FL — LOW (ref 80–100)
NRBC # BLD: 0 /100 WBCS — SIGNIFICANT CHANGE UP (ref 0–0)
PLATELET # BLD AUTO: 314 K/UL — SIGNIFICANT CHANGE UP (ref 150–400)
POTASSIUM SERPL-MCNC: 4.4 MMOL/L — SIGNIFICANT CHANGE UP (ref 3.5–5.3)
POTASSIUM SERPL-SCNC: 4.4 MMOL/L — SIGNIFICANT CHANGE UP (ref 3.5–5.3)
RBC # BLD: 4.21 M/UL — SIGNIFICANT CHANGE UP (ref 3.8–5.2)
RBC # FLD: 14.2 % — SIGNIFICANT CHANGE UP (ref 10.3–14.5)
SODIUM SERPL-SCNC: 140 MMOL/L — SIGNIFICANT CHANGE UP (ref 135–145)
VANCOMYCIN TROUGH SERPL-MCNC: 18.6 UG/ML — SIGNIFICANT CHANGE UP (ref 10–20)
WBC # BLD: 9.54 K/UL — SIGNIFICANT CHANGE UP (ref 3.8–10.5)
WBC # FLD AUTO: 9.54 K/UL — SIGNIFICANT CHANGE UP (ref 3.8–10.5)

## 2024-12-20 PROCEDURE — 76642 ULTRASOUND BREAST LIMITED: CPT

## 2024-12-20 PROCEDURE — 85025 COMPLETE CBC W/AUTO DIFF WBC: CPT

## 2024-12-20 PROCEDURE — 87641 MR-STAPH DNA AMP PROBE: CPT

## 2024-12-20 PROCEDURE — 73718 MRI LOWER EXTREMITY W/O DYE: CPT | Mod: MC

## 2024-12-20 PROCEDURE — 97161 PT EVAL LOW COMPLEX 20 MIN: CPT

## 2024-12-20 PROCEDURE — 80202 ASSAY OF VANCOMYCIN: CPT

## 2024-12-20 PROCEDURE — 85027 COMPLETE CBC AUTOMATED: CPT

## 2024-12-20 PROCEDURE — 87070 CULTURE OTHR SPECIMN AEROBIC: CPT

## 2024-12-20 PROCEDURE — 87077 CULTURE AEROBIC IDENTIFY: CPT

## 2024-12-20 PROCEDURE — 83735 ASSAY OF MAGNESIUM: CPT

## 2024-12-20 PROCEDURE — 82962 GLUCOSE BLOOD TEST: CPT

## 2024-12-20 PROCEDURE — 84100 ASSAY OF PHOSPHORUS: CPT

## 2024-12-20 PROCEDURE — 93005 ELECTROCARDIOGRAM TRACING: CPT

## 2024-12-20 PROCEDURE — 83036 HEMOGLOBIN GLYCOSYLATED A1C: CPT

## 2024-12-20 PROCEDURE — 81003 URINALYSIS AUTO W/O SCOPE: CPT

## 2024-12-20 PROCEDURE — 84484 ASSAY OF TROPONIN QUANT: CPT

## 2024-12-20 PROCEDURE — 73620 X-RAY EXAM OF FOOT: CPT

## 2024-12-20 PROCEDURE — 85652 RBC SED RATE AUTOMATED: CPT

## 2024-12-20 PROCEDURE — 87205 SMEAR GRAM STAIN: CPT

## 2024-12-20 PROCEDURE — 36415 COLL VENOUS BLD VENIPUNCTURE: CPT

## 2024-12-20 PROCEDURE — 80048 BASIC METABOLIC PNL TOTAL CA: CPT

## 2024-12-20 PROCEDURE — 80053 COMPREHEN METABOLIC PANEL: CPT

## 2024-12-20 PROCEDURE — 99239 HOSP IP/OBS DSCHRG MGMT >30: CPT

## 2024-12-20 PROCEDURE — 86140 C-REACTIVE PROTEIN: CPT

## 2024-12-20 PROCEDURE — 71046 X-RAY EXAM CHEST 2 VIEWS: CPT

## 2024-12-20 PROCEDURE — 87040 BLOOD CULTURE FOR BACTERIA: CPT

## 2024-12-20 PROCEDURE — 83605 ASSAY OF LACTIC ACID: CPT

## 2024-12-20 PROCEDURE — 99285 EMERGENCY DEPT VISIT HI MDM: CPT | Mod: 25

## 2024-12-20 PROCEDURE — 87640 STAPH A DNA AMP PROBE: CPT

## 2024-12-20 PROCEDURE — 93923 UPR/LXTR ART STDY 3+ LVLS: CPT

## 2024-12-20 RX ORDER — ACETAMINOPHEN 500MG 500 MG/1
2 TABLET, COATED ORAL
Qty: 0 | Refills: 0 | DISCHARGE
Start: 2024-12-20

## 2024-12-20 RX ORDER — AMOXICILLIN/POTASSIUM CLAV 250-125 MG
1 TABLET ORAL
Qty: 20 | Refills: 0
Start: 2024-12-20 | End: 2024-12-29

## 2024-12-20 RX ORDER — INSULIN DEGLUDEC 100 U/ML
30 INJECTION, SOLUTION SUBCUTANEOUS
Qty: 30 | Refills: 0
Start: 2024-12-20 | End: 2025-01-18

## 2024-12-20 RX ORDER — LIDOCAINE 40 MG/G
1 CREAM TOPICAL
Qty: 3 | Refills: 0
Start: 2024-12-20 | End: 2025-01-02

## 2024-12-20 RX ORDER — AMOXICILLIN/POTASSIUM CLAV 250-125 MG
1 TABLET ORAL EVERY 12 HOURS
Refills: 0 | Status: DISCONTINUED | OUTPATIENT
Start: 2024-12-20 | End: 2024-12-20

## 2024-12-20 RX ORDER — POVIDONE-IODINE 7.5 %
1 SPONGE TOPICAL
Qty: 30 | Refills: 0
Start: 2024-12-20 | End: 2025-01-18

## 2024-12-20 RX ADMIN — Medication 3: at 12:10

## 2024-12-20 RX ADMIN — Medication 12 UNIT(S): at 08:39

## 2024-12-20 RX ADMIN — LIDOCAINE 1 PATCH: 40 CREAM TOPICAL at 01:00

## 2024-12-20 RX ADMIN — Medication 250 MILLIGRAM(S): at 06:54

## 2024-12-20 RX ADMIN — Medication 12 UNIT(S): at 12:11

## 2024-12-20 RX ADMIN — LIDOCAINE 1 PATCH: 40 CREAM TOPICAL at 12:24

## 2024-12-20 RX ADMIN — Medication 1 TABLET(S): at 10:46

## 2024-12-20 RX ADMIN — LIDOCAINE 1 PATCH: 40 CREAM TOPICAL at 12:23

## 2024-12-20 RX ADMIN — Medication 2: at 08:38

## 2024-12-20 NOTE — PROGRESS NOTE ADULT - SUBJECTIVE AND OBJECTIVE BOX
Podiatry Interval: Patient was seen sitting in her chair resting comfortably. No acute overnight events noted. Patient is prepared for discharged today. She has been compliant with keeping her dressings clean, dry, and intact. She has been ambulating with a walker as directed. Patient denies any other pedal complaints and no constitutional symptoms such as F/C/N/V/SOB. AAOx3, NAD.    Podiatry HPI: Podiatry consulted for 64-year-old female with chief complaint of left foot diabetic infection. Patient sent in by her podiatrist, Dr. Delarosa. States she saw Dr. Delarosa last week who noticed an ulceration on her left hallux. Patient states she was prescribed Doxycycline 100 mg BID and was instructed to apply Mupirocin ointment to the wound. She comments that she has taken 2 days worth of the Doxycycline.  Patient states the wound has worsened in appearance over the last week and was instructed by her podiatrist to come in to the ED. She also complains of longstanding redness and swelling to the left foot for over a month now. Patient complains of subjective fever and chills. Denies n/v.     Medications acetaminophen     Tablet .. 650 milliGRAM(s) Oral every 6 hours PRN  amoxicillin  875 milliGRAM(s)/clavulanate 1 Tablet(s) Oral every 12 hours  enoxaparin Injectable 40 milliGRAM(s) SubCutaneous every 24 hours  insulin glargine Injectable (LANTUS) 34 Unit(s) SubCutaneous at bedtime  insulin lispro (ADMELOG) corrective regimen sliding scale   SubCutaneous three times a day before meals  insulin lispro (ADMELOG) corrective regimen sliding scale   SubCutaneous at bedtime  insulin lispro Injectable (ADMELOG) 12 Unit(s) SubCutaneous three times a day before meals  lidocaine   4% Patch 1 Patch Transdermal daily  lidocaine   4% Patch 1 Patch Transdermal daily    FH,   PMHDM (diabetes mellitus)    HTN (hypertension)    Cataract of both eyes, unspecified cataract type       PSHNo significant past surgical history    Cataract of both eyes, unspecified cataract type    History of cholecystectomy        Labs                          11.2   9.54  )-----------( 314      ( 20 Dec 2024 05:20 )             31.5      12-20    140  |  109[H]  |  13  ----------------------------<  156[H]  4.4   |  26  |  1.19    Ca    8.8      20 Dec 2024 05:20       Vital Signs Last 24 Hrs  T(C): 36.8 (20 Dec 2024 05:14), Max: 36.8 (19 Dec 2024 14:15)  T(F): 98.2 (20 Dec 2024 05:14), Max: 98.2 (19 Dec 2024 14:15)  HR: 85 (20 Dec 2024 05:14) (85 - 97)  BP: 148/79 (20 Dec 2024 05:14) (148/79 - 152/72)  BP(mean): 89 (19 Dec 2024 20:55) (89 - 89)  RR: 18 (20 Dec 2024 05:14) (17 - 18)  SpO2: 97% (20 Dec 2024 05:14) (97% - 97%)    Parameters below as of 20 Dec 2024 05:14  Patient On (Oxygen Delivery Method): room air      Sedimentation Rate, Erythrocyte: 62 mm/Hr (12-14-24 @ 04:15)  Sedimentation Rate, Erythrocyte: 67 mm/Hr (12-12-24 @ 19:34)         C-Reactive Protein: 48.3 mg/L (12-14-24 @ 04:15)  C-Reactive Protein: 121.0 mg/L (12-12-24 @ 15:35)   WBC Count: 9.54 K/uL (12-20-24 @ 05:20)    LE FOCUSED PHYSICAL EXAM:   Vasc: DP pulses palpable 1/4 bilaterally. TG within normal limits bilaterally with no more increase in temperature to the left hallux. Pedal hair present. Diffuse erythema and 1+ pitting edema to entire left foot and leg now significantly improved and localized to the left hallux.  Derm: Area of necrosis noted to distal tip of left hallux. Small wound that is now dry/stable, measuring 0.5 x 1.0 cm with fibrograunlar wound base and hyperkeratotic marcus-wound/overlay. No more serous drainage noted. Wound edges are no longer macerated. Tunneling noted medially about 0.7 cm. No fluctuance, no soft tissue crepitus, no malodor, no drainage, no purulence, no streaking.   Neuro: Protective sensation diminished bilaterally  MSK: Muscle strength deferred. Negative calf tenderness bilaterally. Mild tenderness upon palpation directly to the left hallux wound.       IMAGING:      ACC: [id number] EXAM:  XR FOOT 2 VIEWS LT   ORDERED BY: NICOLÁS REYES   ACC: [id number] EXAM:  XR CHEST PA LAT 2V   ORDERED BY: NICOLÁS REYES   PROCEDURE DATE:  12/12/2024    INTERPRETATION:  Toe pain.    AP chest. Prior 12/20/2016.    Low lung volumes. No change heart mediastinum. Grossly clear lungs.    IMPRESSION: Shallow inspiration. Grossly clear lungs.    Podiatry resident read: Midfoot charcot changes. No soft tissue emphysema. No cortical erosions of the hallux.     --- End of Report ---      ACC: [id number] EXAM:  MR FOOT LT   ORDERED BY: FARZANA SÁNCHEZ   PROCEDURE DATE:  12/13/2024    INTERPRETATION:  Exam Type: MR FOOT LEFT  Exam Date and Time: 12/13/2024 11:42 AM  Indication: Left hallux wound. Concern for osteomyelitis.  Comparison: Foot x-rays from one day prior.    TECHNIQUE:  Multiplanar multisequence MRI of the left ankle was performed.    FINDINGS:  There is a soft tissue wound about the tip of the great toe. Mild   surrounding inflammatory changes are present. There is no organized fluid   collection seen at this location. Deep to the wound, there is no low T1   signal identified within the distal phalanx of the great toe. Scattered   patchy high STIR signal is seen. There is no fracture or dislocation.   There are findings of early Charcot arthropathy at the midfoot. There is   no tenosynovitis. Fatty atrophy of the intrinsic forefoot musculature.   Dorsal foot subcutaneous tissue swelling.    IMPRESSION:  Soft tissue wound about the great toe with no low T1 signal and patchy   high STIR signal within the distal phalanx of the great toe deep to the   wound. Findings may reflect reactive osseous changes with early   osteomyelitis also a consideration within the differential diagnosis.    Early Charcot arthropathy at the midfoot.    --- End of Report ---

## 2024-12-20 NOTE — PROGRESS NOTE ADULT - REASON FOR ADMISSION
L hallux injury
Left hallux injury
L hallux injury

## 2024-12-20 NOTE — PHARMACOTHERAPY INTERVENTION NOTE - COMMENTS
Vancomycin level obtained while vancomycin infusing.     Suggest to check level at 17:00, would consider holding next dose until result returns.
Left leg cellulitis and breast abscess s/p incision and drainage.    Breast abscess culture finalized with finegoldia magna.    Suggest to switch vancomycin to in the morningoxicillin-clavulanate 875/125 mg, 1 tablet every 12 hours.

## 2024-12-20 NOTE — PROGRESS NOTE ADULT - ASSESSMENT
A:   Left hallux distal phalanx - reactive osseous changes vs. early OM  Left Hallux diabetic ulcer - dry/stable  Charcot neuroarthropathy, left midfoot     P:  Patient evaluated chart reviewed    Vitals stable, leukocytosis absent  Betadine DSD applied to the left foot.   Advised patient to keep dressings clean, dry, and intact.   Stressed the importance of proper glycemic control, daily foot examinations, and wearing proper shoe gear   Explained to the patient that she is to remain NWB to the left foot in order to prevent ulcerations and worsening of midfoot Charcot deformity  Patient inquired about surgical reconstructive options for charcot; however, it was discussed with patient that her A1c levels need to controlled, so that can be considered in the future outpatient if she remains compliant with taking care of her diabetes.   All questions and concerns addressed to the patient's satisfaction. Patient demonstrated verbal understanding.   Patient is stable from a podiatry standpoint  Seen with Dr. Elizabeth and discussed with attending Dr. Palma     Upon discharge, patient to follow up in outpatient clinic within 1 week:   [facility]  [address] [place] [address]  P:     WCO: Betadine, 4x4 gauze, maru, ACE (M/W/)

## 2024-12-20 NOTE — DISCHARGE NOTE NURSING/CASE MANAGEMENT/SOCIAL WORK - PATIENT PORTAL LINK FT
You can access the FollowMyHealth Patient Portal offered by [facility] by registering at the following website: [email or web address]. By joining "[employer]’s FollowMyHealth portal, you will also be able to view your health information using other applications (apps) compatible with our system.

## 2024-12-20 NOTE — PROGRESS NOTE ADULT - PROVIDER SPECIALTY LIST ADULT
Endocrinology
Infectious Disease
Infectious Disease
Internal Medicine
Podiatry
Podiatry
Surgery
Surgery
Endocrinology
Internal Medicine
Internal Medicine
Podiatry
Surgery
Endocrinology
Endocrinology
Infectious Disease
Podiatry
Podiatry
Surgery
Infectious Disease
Internal Medicine

## 2024-12-20 NOTE — PROGRESS NOTE ADULT - SUBJECTIVE AND OBJECTIVE BOX
Interval Events:  pt in nad    Allergies    No Known Allergies    Intolerances      Endocrine/Metabolic Medications:  insulin glargine Injectable (LANTUS) 34 Unit(s) SubCutaneous at bedtime  insulin lispro (ADMELOG) corrective regimen sliding scale   SubCutaneous three times a day before meals  insulin lispro (ADMELOG) corrective regimen sliding scale   SubCutaneous at bedtime  insulin lispro Injectable (ADMELOG) 12 Unit(s) SubCutaneous three times a day before meals      Vital Signs Last 24 Hrs  T(C): 36.8 (20 Dec 2024 05:14), Max: 36.8 (19 Dec 2024 14:15)  T(F): 98.2 (20 Dec 2024 05:14), Max: 98.2 (19 Dec 2024 14:15)  HR: 85 (20 Dec 2024 05:14) (85 - 97)  BP: 148/79 (20 Dec 2024 05:14) (148/79 - 152/72)  BP(mean): 89 (19 Dec 2024 20:55) (89 - 89)  RR: 18 (20 Dec 2024 05:14) (17 - 18)  SpO2: 97% (20 Dec 2024 05:14) (97% - 97%)    Parameters below as of 20 Dec 2024 05:14  Patient On (Oxygen Delivery Method): room air          PHYSICAL EXAM  All physical exam findings normal, except those marked:  General:	Alert, active, cooperative, NAD, well hydrated  .		[] Abnormal:  Neck		Normal: supple, no cervical adenopathy, no palpable thyroid  .		[] Abnormal:  Cardiovascular	Normal: regular rate, normal S1, S2, no murmurs  .		[] Abnormal:  Respiratory	Normal: no chest wall deformity, normal respiratory pattern, CTA B/L  .		[] Abnormal:  Abdominal	Normal: soft, ND, NT, bowel sounds present, no masses, no organomegaly  .		[] Abnormal:  		Normal normal genitalia, testes descended, circumcised/uncircumcised  .		Michael stage:			Breast michael:  .		Menstrual history:  .		[] Abnormal:  Extremities	Normal: FROM x4  .		[] Abnormal:  Skin		Normal: intact and not indurated, no rash, no acanthosis nigricans  .		[] Abnormal:  Neurologic	Normal: grossly intact  .		[] Abnormal:    LABS                        11.2   9.54  )-----------( 314      ( 20 Dec 2024 05:20 )             31.5                               140    |  109    |  13                  Calcium: 8.8   / iCa: x      (12-20 @ 05:20)    ----------------------------<  156       Magnesium: x                                4.4     |  26     |  1.19             Phosphorous: x          CAPILLARY BLOOD GLUCOSE      POCT Blood Glucose.: 212 mg/dL (20 Dec 2024 08:23)  POCT Blood Glucose.: 97 mg/dL (19 Dec 2024 21:17)  POCT Blood Glucose.: 97 mg/dL (19 Dec 2024 16:48)  POCT Blood Glucose.: 103 mg/dL (19 Dec 2024 11:47)        Assesment/plan    Patient is a 64 year old female from home ambulates independently, with PMH of  T2DM (on insulin pump) who presented to the ED with a L hallux x 3-4d.   Endocrine consulted for dm management. Pt takes novolog via Cequer/ Simplicity without basal insulin. Chacks fsg 200-300s usually. Denies hypoglycemia. F/u with endo- Dr. Grace.         Problem/Recommendation - 1:  ·  Problem: DM (diabetes mellitus).   ·  Recommendation: uncontrolled with hyperglycemia  cont lantus 34 units- change to treseba upon d/c  and dec admelog to 10 ac tid- change to Cequer/simplicity- 5-6 clicks each meal   a1c->15%!  compliance d/w pt  fsg ac and hs  d/w pt need for basal/bolus insulin  nutrition eval  d/w prim team     Problem/Recommendation - 2:  ·  Problem: Diabetic foot ulcer.   ·  Recommendation: cont iv bax  tx per podiatry and ID.       Interval Events:  pt in nad    Allergies    No Known Allergies    Intolerances      Endocrine/Metabolic Medications:  insulin glargine Injectable (LANTUS) 34 Unit(s) SubCutaneous at bedtime  insulin lispro (ADMELOG) corrective regimen sliding scale   SubCutaneous three times a day before meals  insulin lispro (ADMELOG) corrective regimen sliding scale   SubCutaneous at bedtime  insulin lispro Injectable (ADMELOG) 12 Unit(s) SubCutaneous three times a day before meals      Vital Signs Last 24 Hrs  T(C): 36.8 (20 Dec 2024 05:14), Max: 36.8 (19 Dec 2024 14:15)  T(F): 98.2 (20 Dec 2024 05:14), Max: 98.2 (19 Dec 2024 14:15)  HR: 85 (20 Dec 2024 05:14) (85 - 97)  BP: 148/79 (20 Dec 2024 05:14) (148/79 - 152/72)  BP(mean): 89 (19 Dec 2024 20:55) (89 - 89)  RR: 18 (20 Dec 2024 05:14) (17 - 18)  SpO2: 97% (20 Dec 2024 05:14) (97% - 97%)    Parameters below as of 20 Dec 2024 05:14  Patient On (Oxygen Delivery Method): room air          PHYSICAL EXAM  All physical exam findings normal, except those marked:  General:	Alert, active, cooperative, NAD, well hydrated  .		[] Abnormal:  Neck		Normal: supple, no cervical adenopathy, no palpable thyroid  .		[] Abnormal:  Cardiovascular	Normal: regular rate, normal S1, S2, no murmurs  .		[] Abnormal:  Respiratory	Normal: no chest wall deformity, normal respiratory pattern, CTA B/L  .		[] Abnormal:  Abdominal	Normal: soft, ND, NT, bowel sounds present, no masses, no organomegaly  .		[] Abnormal:  		Normal normal genitalia, testes descended, circumcised/uncircumcised  .		Michael stage:			Breast michael:  .		Menstrual history:  .		[] Abnormal:  Extremities	Normal: FROM x4  .		[] Abnormal:  Skin		Normal: intact and not indurated, no rash, no acanthosis nigricans  .		[] Abnormal:  Neurologic	Normal: grossly intact  .		[] Abnormal:    LABS                        11.2   9.54  )-----------( 314      ( 20 Dec 2024 05:20 )             31.5                               140    |  109    |  13                  Calcium: 8.8   / iCa: x      (12-20 @ 05:20)    ----------------------------<  156       Magnesium: x                                4.4     |  26     |  1.19             Phosphorous: x          CAPILLARY BLOOD GLUCOSE      POCT Blood Glucose.: 212 mg/dL (20 Dec 2024 08:23)  POCT Blood Glucose.: 97 mg/dL (19 Dec 2024 21:17)  POCT Blood Glucose.: 97 mg/dL (19 Dec 2024 16:48)  POCT Blood Glucose.: 103 mg/dL (19 Dec 2024 11:47)        Assesment/plan    Patient is a 64 year old female from home ambulates independently, with PMH of  T2DM (on insulin pump) who presented to the ED with a L hallux x 3-4d.   Endocrine consulted for dm management. Pt takes novolog via Cequer/ Simplicity without basal insulin. Chacks fsg 200-300s usually. Denies hypoglycemia. F/u with endo- Dr. Grace.         Problem/Recommendation - 1:  ·  Problem: DM (diabetes mellitus).   ·  Recommendation: uncontrolled with hyperglycemia  dec lantus 30 units- change to treseba upon d/c  and dec admelog to 10 ac tid- change to Cequer/simplicity- 5-6 clicks each meal   a1c->15%!  compliance d/w pt  fsg ac and hs  d/w pt need for basal/bolus insulin  nutrition eval  d/w prim team     Problem/Recommendation - 2:  ·  Problem: Diabetic foot ulcer.   ·  Recommendation: cont iv bax  tx per podiatry and ID.

## 2024-12-20 NOTE — PROGRESS NOTE ADULT - SUBJECTIVE AND OBJECTIVE BOX
64y Female sitting up in the bed and in no acute distress, she has no new complaints. Her left foot is dressed, clean and dry. Denies any pain to her left foot although still c.o mild pain to the left hip. Her left under breast is also dressed. Denies any nausea, vomiting or diarrhea. Has not had any fevers and wbc count is normal. Her breast abscess cultures grew out finegoldia magna.    MEDS:  amoxicillin  875 milliGRAM(s)/clavulanate 1 Tablet(s) Oral every 12 hours    ALLERGIES: Allergies    No Known Allergies    Intolerances    REVIEW OF SYSTEMS:  [  ] Not able to elicit  General: no fevers no malaise  Chest: no cough no sob  GI: no nvd  : no urinary sxs   Skin: no rashes  Musculoskeletal: mild left hip pain  Neuro: no ha's no dizziness     VITALS:  Vital Signs Last 24 Hrs  T(C): 36.8 (20 Dec 2024 05:14), Max: 36.8 (19 Dec 2024 14:15)  T(F): 98.2 (20 Dec 2024 05:14), Max: 98.2 (19 Dec 2024 14:15)  HR: 85 (20 Dec 2024 05:14) (85 - 97)  BP: 148/79 (20 Dec 2024 05:14) (148/79 - 152/72)  BP(mean): 89 (19 Dec 2024 20:55) (89 - 89)  RR: 18 (20 Dec 2024 05:14) (17 - 18)  SpO2: 97% (20 Dec 2024 05:14) (97% - 97%)    Parameters below as of 20 Dec 2024 05:14  Patient On (Oxygen Delivery Method): room air    PHYSICAL EXAM:  HEENT: normocephalic, conjunctivae and sclerae clear; moist mucous membranes  Neck: supple no LN's   Respiratory: lungs clear no rales  Cardiovascular: S1 S2 reg no murmurs  Gastrointestinal: +BS with soft, nondistended abdomen; nontender  Extremities: Left foot erythema and edema improving , +2 pitting edema of LLE   Skin: As above; Right under breast dressed   Ortho: no erythema or joint swelling  Neuro: AAO x 3    LABS/DIAGNOSTIC TESTS:                        11.2   9.54  )-----------( 314      ( 20 Dec 2024 05:20 )             31.5     WBC Count: 9.54 K/uL (12-20 @ 05:20)  WBC Count: 10.05 K/uL (12-19 @ 06:39)  WBC Count: 9.95 K/uL (12-18 @ 05:15)  WBC Count: 9.46 K/uL (12-17 @ 04:15)  WBC Count: 10.34 K/uL (12-16 @ 07:14)    12-20    140  |  109[H]  |  13  ----------------------------<  156[H]  4.4   |  26  |  1.19    Ca    8.8      20 Dec 2024 05:20    CULTURES:   .Abscess  12-17 @ 17:00   Few Finegoldia magna "Susceptibilities not performed"  --    Few polymorphonuclear leukocytes seen per low power field  Few Gram positive cocci in pairs seen per oil power field    .Blood BLOOD  12-12 @ 15:40   No growth at 5 days  --  --    .Blood BLOOD  12-12 @ 15:35   No growth at 5 days  --  --    RADIOLOGY:

## 2024-12-20 NOTE — DISCHARGE NOTE NURSING/CASE MANAGEMENT/SOCIAL WORK - FINANCIAL ASSISTANCE
[facility] provides services at a reduced cost to those who are determined to be eligible through [facility]’s financial assistance program. Information regarding [facility]’s financial assistance program can be found by going to [email or web address] or by calling [phone].

## 2024-12-20 NOTE — PROGRESS NOTE ADULT - ASSESSMENT
Left foot and leg Cellulitis - improving slowly  Right breast abscess - s/p I&D  Leukocytosis - normalized    Plan -   ·	DC Vancomycin   ·	Start amoxicillin-clavulanate 875/125 mg po q12hrs  ·	breast abscess cultures grew out finegoldia magna.  ·	will plan to DC home today with amoxicillin-clavulanate 875/125 mg po q12hrs x 10 days  ·	DC planning  ·	reconsult prn

## 2024-12-20 NOTE — CHART NOTE - NSCHARTNOTEFT_GEN_A_CORE
Patient 63 y/o F hx of DM, left foot ulcer. Admitted for left foot diabetic ulcer with cellulitis. requires a Rolling walker for ambulation and daily activity for safety.     [facility] Ale NP  [phone]  NPI # [id number]

## 2024-12-20 NOTE — DISCHARGE NOTE NURSING/CASE MANAGEMENT/SOCIAL WORK - NSDCFUADDAPPT_GEN_ALL_CORE_FT
APPTS ARE READY TO BE MADE: [X] YES    Best Family or Patient Contact (if needed):    Additional Information about above appointments (if needed):    1: PCP  2: Endocrine f/u dr. Bhagat   3: Surgery f/u dr. Webster    Other comments or requests:       SOC: 12/21/24

## 2024-12-27 ENCOUNTER — APPOINTMENT (OUTPATIENT)
Dept: SURGICAL ONCOLOGY | Facility: CLINIC | Age: 64
End: 2024-12-27
Payer: COMMERCIAL

## 2024-12-27 VITALS
DIASTOLIC BLOOD PRESSURE: 72 MMHG | WEIGHT: 154 LBS | BODY MASS INDEX: 25.66 KG/M2 | OXYGEN SATURATION: 95 % | HEART RATE: 105 BPM | SYSTOLIC BLOOD PRESSURE: 125 MMHG | HEIGHT: 65 IN

## 2024-12-27 DIAGNOSIS — N61.1 ABSCESS OF THE BREAST AND NIPPLE: ICD-10-CM

## 2024-12-27 PROCEDURE — 99024 POSTOP FOLLOW-UP VISIT: CPT

## 2025-01-14 ENCOUNTER — INPATIENT (INPATIENT)
Facility: HOSPITAL | Age: 65
LOS: 2 days | Discharge: HOME | DRG: 204 | End: 2025-01-17
Attending: STUDENT IN AN ORGANIZED HEALTH CARE EDUCATION/TRAINING PROGRAM | Admitting: STUDENT IN AN ORGANIZED HEALTH CARE EDUCATION/TRAINING PROGRAM
Payer: COMMERCIAL

## 2025-01-14 VITALS
DIASTOLIC BLOOD PRESSURE: 63 MMHG | SYSTOLIC BLOOD PRESSURE: 135 MMHG | TEMPERATURE: 98 F | OXYGEN SATURATION: 94 % | HEART RATE: 108 BPM | HEIGHT: 65 IN | WEIGHT: 177.91 LBS | RESPIRATION RATE: 16 BRPM

## 2025-01-14 DIAGNOSIS — R09.89 OTHER SPECIFIED SYMPTOMS AND SIGNS INVOLVING THE CIRCULATORY AND RESPIRATORY SYSTEMS: ICD-10-CM

## 2025-01-14 DIAGNOSIS — M79.604 PAIN IN RIGHT LEG: ICD-10-CM

## 2025-01-14 DIAGNOSIS — I50.9 HEART FAILURE, UNSPECIFIED: ICD-10-CM

## 2025-01-14 DIAGNOSIS — R06.09 OTHER FORMS OF DYSPNEA: ICD-10-CM

## 2025-01-14 DIAGNOSIS — Z29.9 ENCOUNTER FOR PROPHYLACTIC MEASURES, UNSPECIFIED: ICD-10-CM

## 2025-01-14 DIAGNOSIS — H26.9 UNSPECIFIED CATARACT: Chronic | ICD-10-CM

## 2025-01-14 DIAGNOSIS — U07.1 COVID-19: ICD-10-CM

## 2025-01-14 DIAGNOSIS — E11.9 TYPE 2 DIABETES MELLITUS WITHOUT COMPLICATIONS: ICD-10-CM

## 2025-01-14 DIAGNOSIS — Z90.49 ACQUIRED ABSENCE OF OTHER SPECIFIED PARTS OF DIGESTIVE TRACT: Chronic | ICD-10-CM

## 2025-01-14 DIAGNOSIS — I24.9 ACUTE ISCHEMIC HEART DISEASE, UNSPECIFIED: ICD-10-CM

## 2025-01-14 LAB
ALBUMIN SERPL ELPH-MCNC: 3.3 G/DL — LOW (ref 3.5–5)
ALP SERPL-CCNC: 97 U/L — SIGNIFICANT CHANGE UP (ref 40–120)
ALT FLD-CCNC: 23 U/L DA — SIGNIFICANT CHANGE UP (ref 10–60)
ANION GAP SERPL CALC-SCNC: 9 MMOL/L — SIGNIFICANT CHANGE UP (ref 5–17)
ANISOCYTOSIS BLD QL: SLIGHT — SIGNIFICANT CHANGE UP
APTT BLD: 28.8 SEC — SIGNIFICANT CHANGE UP (ref 24.5–35.6)
AST SERPL-CCNC: 28 U/L — SIGNIFICANT CHANGE UP (ref 10–40)
BASE EXCESS BLDV CALC-SCNC: 4.7 MMOL/L — SIGNIFICANT CHANGE UP
BASOPHILS # BLD AUTO: 0.1 K/UL — SIGNIFICANT CHANGE UP (ref 0–0.2)
BASOPHILS NFR BLD AUTO: 0.9 % — SIGNIFICANT CHANGE UP (ref 0–2)
BILIRUB SERPL-MCNC: 0.7 MG/DL — SIGNIFICANT CHANGE UP (ref 0.2–1.2)
BUN SERPL-MCNC: 17 MG/DL — SIGNIFICANT CHANGE UP (ref 7–18)
CALCIUM SERPL-MCNC: 9.1 MG/DL — SIGNIFICANT CHANGE UP (ref 8.4–10.5)
CHLORIDE SERPL-SCNC: 99 MMOL/L — SIGNIFICANT CHANGE UP (ref 96–108)
CK SERPL-CCNC: 191 U/L — SIGNIFICANT CHANGE UP (ref 21–215)
CO2 SERPL-SCNC: 27 MMOL/L — SIGNIFICANT CHANGE UP (ref 22–31)
CREAT SERPL-MCNC: 1.09 MG/DL — SIGNIFICANT CHANGE UP (ref 0.5–1.3)
EGFR: 57 ML/MIN/1.73M2 — LOW
EOSINOPHIL # BLD AUTO: 0.4 K/UL — SIGNIFICANT CHANGE UP (ref 0–0.5)
EOSINOPHIL NFR BLD AUTO: 3.7 % — SIGNIFICANT CHANGE UP (ref 0–6)
FLUAV AG NPH QL: SIGNIFICANT CHANGE UP
FLUBV AG NPH QL: SIGNIFICANT CHANGE UP
GLUCOSE BLDC GLUCOMTR-MCNC: 267 MG/DL — HIGH (ref 70–99)
GLUCOSE SERPL-MCNC: 239 MG/DL — HIGH (ref 70–99)
HCO3 BLDV-SCNC: 31 MMOL/L — HIGH (ref 22–29)
HCT VFR BLD CALC: 33.2 % — LOW (ref 34.5–45)
HGB BLD-MCNC: 11.5 G/DL — SIGNIFICANT CHANGE UP (ref 11.5–15.5)
IMM GRANULOCYTES NFR BLD AUTO: 0.3 % — SIGNIFICANT CHANGE UP (ref 0–0.9)
INR BLD: 1.12 RATIO — SIGNIFICANT CHANGE UP (ref 0.85–1.16)
LYMPHOCYTES # BLD AUTO: 2.85 K/UL — SIGNIFICANT CHANGE UP (ref 1–3.3)
LYMPHOCYTES # BLD AUTO: 26.5 % — SIGNIFICANT CHANGE UP (ref 13–44)
MANUAL SMEAR VERIFICATION: SIGNIFICANT CHANGE UP
MCHC RBC-ENTMCNC: 25.9 PG — LOW (ref 27–34)
MCHC RBC-ENTMCNC: 34.6 G/DL — SIGNIFICANT CHANGE UP (ref 32–36)
MCV RBC AUTO: 74.8 FL — LOW (ref 80–100)
MICROCYTES BLD QL: SLIGHT — SIGNIFICANT CHANGE UP
MONOCYTES # BLD AUTO: 0.8 K/UL — SIGNIFICANT CHANGE UP (ref 0–0.9)
MONOCYTES NFR BLD AUTO: 7.4 % — SIGNIFICANT CHANGE UP (ref 2–14)
NEUTROPHILS # BLD AUTO: 6.59 K/UL — SIGNIFICANT CHANGE UP (ref 1.8–7.4)
NEUTROPHILS NFR BLD AUTO: 61.2 % — SIGNIFICANT CHANGE UP (ref 43–77)
NRBC # BLD: 0 /100 WBCS — SIGNIFICANT CHANGE UP (ref 0–0)
NT-PROBNP SERPL-SCNC: 6930 PG/ML — HIGH (ref 0–125)
PCO2 BLDV: 51 MMHG — HIGH (ref 39–42)
PH BLDV: 7.39 — SIGNIFICANT CHANGE UP (ref 7.32–7.43)
PLAT MORPH BLD: NORMAL — SIGNIFICANT CHANGE UP
PLATELET # BLD AUTO: 348 K/UL — SIGNIFICANT CHANGE UP (ref 150–400)
PO2 BLDV: 38 MMHG — SIGNIFICANT CHANGE UP
POIKILOCYTOSIS BLD QL AUTO: SLIGHT — SIGNIFICANT CHANGE UP
POTASSIUM SERPL-MCNC: 4.1 MMOL/L — SIGNIFICANT CHANGE UP (ref 3.5–5.3)
POTASSIUM SERPL-SCNC: 4.1 MMOL/L — SIGNIFICANT CHANGE UP (ref 3.5–5.3)
PROT SERPL-MCNC: 8.6 G/DL — HIGH (ref 6–8.3)
PROTHROM AB SERPL-ACNC: 13.1 SEC — SIGNIFICANT CHANGE UP (ref 9.9–13.4)
RBC # BLD: 4.44 M/UL — SIGNIFICANT CHANGE UP (ref 3.8–5.2)
RBC # FLD: 15.3 % — HIGH (ref 10.3–14.5)
RBC BLD AUTO: ABNORMAL
RSV RNA NPH QL NAA+NON-PROBE: SIGNIFICANT CHANGE UP
SAO2 % BLDV: 56.6 % — SIGNIFICANT CHANGE UP
SARS-COV-2 RNA SPEC QL NAA+PROBE: DETECTED
SCHISTOCYTES BLD QL AUTO: SLIGHT — SIGNIFICANT CHANGE UP
SODIUM SERPL-SCNC: 135 MMOL/L — SIGNIFICANT CHANGE UP (ref 135–145)
SPHEROCYTES BLD QL SMEAR: SLIGHT — SIGNIFICANT CHANGE UP
TROPONIN I, HIGH SENSITIVITY RESULT: 830.4 NG/L — HIGH
TROPONIN I, HIGH SENSITIVITY RESULT: 834.9 NG/L — HIGH
WBC # BLD: 10.77 K/UL — HIGH (ref 3.8–10.5)
WBC # FLD AUTO: 10.77 K/UL — HIGH (ref 3.8–10.5)

## 2025-01-14 PROCEDURE — 99285 EMERGENCY DEPT VISIT HI MDM: CPT

## 2025-01-14 PROCEDURE — 99223 1ST HOSP IP/OBS HIGH 75: CPT | Mod: GC

## 2025-01-14 PROCEDURE — 71045 X-RAY EXAM CHEST 1 VIEW: CPT | Mod: 26

## 2025-01-14 PROCEDURE — 93970 EXTREMITY STUDY: CPT | Mod: 26

## 2025-01-14 RX ORDER — SODIUM CHLORIDE 9 MG/ML
1000 INJECTION, SOLUTION INTRAVENOUS
Refills: 0 | Status: DISCONTINUED | OUTPATIENT
Start: 2025-01-14 | End: 2025-01-17

## 2025-01-14 RX ORDER — INSULIN LISPRO 100/ML
10 VIAL (ML) SUBCUTANEOUS
Refills: 0 | Status: DISCONTINUED | OUTPATIENT
Start: 2025-01-14 | End: 2025-01-17

## 2025-01-14 RX ORDER — ACETAMINOPHEN 80 MG/.8ML
650 SOLUTION/ DROPS ORAL EVERY 6 HOURS
Refills: 0 | Status: DISCONTINUED | OUTPATIENT
Start: 2025-01-14 | End: 2025-01-17

## 2025-01-14 RX ORDER — GLUCAGON INJECTION, SOLUTION 0.5 MG/.1ML
1 INJECTION, SOLUTION SUBCUTANEOUS ONCE
Refills: 0 | Status: DISCONTINUED | OUTPATIENT
Start: 2025-01-14 | End: 2025-01-17

## 2025-01-14 RX ORDER — ONDANSETRON 4 MG/1
4 TABLET ORAL EVERY 8 HOURS
Refills: 0 | Status: DISCONTINUED | OUTPATIENT
Start: 2025-01-14 | End: 2025-01-17

## 2025-01-14 RX ORDER — ENOXAPARIN SODIUM 60 MG/.6ML
40 INJECTION INTRAVENOUS; SUBCUTANEOUS EVERY 24 HOURS
Refills: 0 | Status: DISCONTINUED | OUTPATIENT
Start: 2025-01-14 | End: 2025-01-15

## 2025-01-14 RX ORDER — DEXTROSE MONOHYDRATE 25 G/50ML
25 INJECTION, SOLUTION INTRAVENOUS ONCE
Refills: 0 | Status: DISCONTINUED | OUTPATIENT
Start: 2025-01-14 | End: 2025-01-17

## 2025-01-14 RX ORDER — DEXTROSE MONOHYDRATE 25 G/50ML
12.5 INJECTION, SOLUTION INTRAVENOUS ONCE
Refills: 0 | Status: DISCONTINUED | OUTPATIENT
Start: 2025-01-14 | End: 2025-01-17

## 2025-01-14 RX ORDER — INSULIN LISPRO 100/ML
VIAL (ML) SUBCUTANEOUS
Refills: 0 | Status: DISCONTINUED | OUTPATIENT
Start: 2025-01-14 | End: 2025-01-17

## 2025-01-14 RX ORDER — FUROSEMIDE 20 MG
20 TABLET ORAL ONCE
Refills: 0 | Status: COMPLETED | OUTPATIENT
Start: 2025-01-14 | End: 2025-01-14

## 2025-01-14 RX ORDER — INSULIN LISPRO 100/ML
VIAL (ML) SUBCUTANEOUS AT BEDTIME
Refills: 0 | Status: DISCONTINUED | OUTPATIENT
Start: 2025-01-14 | End: 2025-01-17

## 2025-01-14 RX ORDER — FUROSEMIDE 20 MG
40 TABLET ORAL
Refills: 0 | Status: DISCONTINUED | OUTPATIENT
Start: 2025-01-14 | End: 2025-01-15

## 2025-01-14 RX ORDER — ENOXAPARIN SODIUM 60 MG/.6ML
80 INJECTION INTRAVENOUS; SUBCUTANEOUS ONCE
Refills: 0 | Status: COMPLETED | OUTPATIENT
Start: 2025-01-14 | End: 2025-01-14

## 2025-01-14 RX ORDER — FUROSEMIDE 20 MG
40 TABLET ORAL
Refills: 0 | Status: DISCONTINUED | OUTPATIENT
Start: 2025-01-14 | End: 2025-01-14

## 2025-01-14 RX ORDER — INSULIN GLARGINE-YFGN 100 [IU]/ML
30 INJECTION, SOLUTION SUBCUTANEOUS AT BEDTIME
Refills: 0 | Status: DISCONTINUED | OUTPATIENT
Start: 2025-01-14 | End: 2025-01-17

## 2025-01-14 RX ORDER — DEXTROSE MONOHYDRATE 25 G/50ML
15 INJECTION, SOLUTION INTRAVENOUS ONCE
Refills: 0 | Status: DISCONTINUED | OUTPATIENT
Start: 2025-01-14 | End: 2025-01-17

## 2025-01-14 RX ADMIN — ENOXAPARIN SODIUM 80 MILLIGRAM(S): 60 INJECTION INTRAVENOUS; SUBCUTANEOUS at 17:46

## 2025-01-14 RX ADMIN — INSULIN GLARGINE-YFGN 30 UNIT(S): 100 INJECTION, SOLUTION SUBCUTANEOUS at 22:43

## 2025-01-14 RX ADMIN — Medication 40 MILLIGRAM(S): at 20:15

## 2025-01-14 RX ADMIN — Medication 20 MILLIGRAM(S): at 17:46

## 2025-01-14 NOTE — H&P ADULT - HISTORY OF PRESENT ILLNESS
64F, hx of diabetic foot ulcer and T2DM, p/w SOB, GARG, orthopnea. Endorses symptoms started 1 day after prior discharge from [facility] in December 2024 for diabetic foot ulcer. Cough has been dry and denies associated symptoms of fever, chills, runny nose, congestion, sore throat. Has not traveled recently and has had no sick contacts. 64F, lives at home and ambulates independently, with hx of diabetic foot ulcer and T2DM, p/w dry cough, SOB, GARG, orthopnea. Endorses symptoms started 1 day after prior discharge from [facility] in December 2024 for diabetic foot ulcer. 1 week ago, noticed her SOB was worsening and her legs were swelling up for which she went to see her PCP who gave her Lasix 20 qD PRN. Saw no improvement with the Lasix and endorses having decreased urine output. Patient has no prior cardiac history, hx of CAD, stents and does not follow a cardiologist. Denies any CP, syncope, dizziness. Patient c/o bilateral leg pain R>L with new onset of clear-filled fluid blisters on the RLE that started 3 days ago. Has not experienced any fever, redness but c/o toes in both of her feet burning.    Patient endorses not having traveled recently and has had no sick contacts. Denies associated symptoms of fever, chills, runny nose, congestion, sore throat.

## 2025-01-14 NOTE — H&P ADULT - PROBLEM SELECTOR PLAN 2
P/w no CP, but SOB  No prior cardiac history, does not follow cardiologist;   Family Hx of CHF in mother  EKG NSR  Trop 830.4 ->  Likely iso demand ischemia    -Admit to tele  -f/u T2  - P/w no CP, but SOB  No prior cardiac history, does not follow cardiologist;   Family Hx of CHF in mother  EKG NSR  Trop 830.4 ->  Likely iso demand ischemia  Cards consulted    -Admit to tele  -f/u T2  -f/u Cards recs

## 2025-01-14 NOTE — ED PROVIDER NOTE - EKG #1 DATE/TIME
Reanna Cain, LPN  Mg  Procedure Coordinator 6 minutes ago (1:29 PM)     NH    Please schedule nurse only visit in derm for in 6 days, thanks!       14-Jan-2025 15:35

## 2025-01-14 NOTE — H&P ADULT - ASSESSMENT
64F, hx of T2DM, previous admission for diabetic foot ulcer in Dec 2024 presenting with SOB, GARG, orthopnea &new fluid filled blisters on RLE. Admitted for acute CHF exacerbation, ACS r/o, and COVID management 64F, hx of  [id number], previous admission for diabetic foot ulcer in Dec 2024 presenting with SOB, GARG, orthopnea &new fluid filled blisters on RLE. Admitted for acute CHF exacerbation, ACS r/o, COVID management and follow-up with podiatry regarding new skin changes to LE.

## 2025-01-14 NOTE — ED PROVIDER NOTE - PHYSICAL EXAMINATION
O2 sat 90-91 on room air.  Patient speaking short sentences appears mildly short of breath.  Noted 2+ pitting edema to both legs right more than left.  Awake alert not in any distress.  Heart regular abdomen nontender

## 2025-01-14 NOTE — H&P ADULT - ATTENDING COMMENTS
Patient seen and examined on 1/14/25. Case discussed with housestaff. In brief, this is a 63 yo F with DM2 c/b diabetic foot ulcer, recent admission to [facility] in 12/24 for diabetic foot ulcer who presents to the ED for worsening SOB, cough, and orthopnea. Per the patient, the symptoms began shortly after her discharge from [facility] in 12/24. She then noticed swelling in her LE so she decided to visit her PCP. Her PCP prescribed Lasix 20mg daily but patient reports that she did not improve so she decided to come to the ED. In the ED, she denied any CP, palpitations, dizziness, or LOC. She denies any hx of CHF but does report a hx of CHF in her mother. Will admit to telemetry for possible acute decompensated CHF. Will consult cardiology (Dr. Gilmore), abdon with Lasix 40mg IV BID, strict I/O's, daily weights. Patient's last TTE was in 2016 so will check TTE. Will also consult podiatry for LE wounds (blisters) and to assess progress of her diabetic foot wound from December. Patient also noted to be COVID positive but imaging more suggestive of a decompensated heart failure picture. Will monitor for now as patient is on room air. Remaining care as noted above.

## 2025-01-14 NOTE — H&P ADULT - PROBLEM SELECTOR PLAN 3
P/w dry cough only; no hx of fever, chills, cough, congestion  Positive for COVID    -Isolation Precautions  -No other interventions at this time

## 2025-01-14 NOTE — H&P ADULT - PROBLEM SELECTOR PLAN 4
[facility] and discharged with oral Augmentin x 10d  P/w b/l LE pain greater on the Right than left; fluid filled blisters on the right leg  Duplex negative for DVT  Podiatry consulted    -f/u podiatry recs

## 2025-01-14 NOTE — ED PROVIDER NOTE - CARE PLAN
1 Principal Discharge DX:	Hypoxia   Principal Discharge DX:	Dyspnea on exertion  Secondary Diagnosis:	COVID  Secondary Diagnosis:	Non-ST elevation MI (NSTEMI)

## 2025-01-14 NOTE — ED PROVIDER NOTE - OBJECTIVE STATEMENT
63-year-old female diabetes hypertension presents with 1 week of worsening cough shortness of breath dyspnea on exertion and worsening leg swelling.  She went to her doctor a week ago and the symptoms started and he gave her Lasix 20 mg once a day patient.  Patient's been taking it daily however feels that symptoms have not improved.  She notes she had blisters on her legs that have gotten larger and now burst and now her bandages are soaked in liquid.  Denies any fever or chills any nausea vomiting or diarrhea.

## 2025-01-14 NOTE — ED PROVIDER NOTE - PROGRESS NOTE DETAILS
repeat O2 sat is 94-97 on room air.  Initially plan was to get CT angio but can deferred at this point given that there is evidence of fluid overload as the most likely explanation for dyspnea.  Will obtain duplex of both legs.  Patient is COVID-positive.  Admit for cardiac workup

## 2025-01-14 NOTE — H&P ADULT - PROBLEM SELECTOR PLAN 1
P/w SOB, GARG, Orthopnea for approx 1 month with no improvement on Lasix 20 qD PRN  B/l crackles ausculated with 3+ pitting edema  BNP 6930  EKG: NSR  TTE 2016: Normal LVSF EF 55-60%, Mild MR, G2DD  Cards consulted, Dr. Gilmore    -IV lasix 40 BID  -f/u TTE  -f/u Cards recs  -Admit to tele  -Strict I/Os  -Daily weights  -Fluid restrict 1L P/w SOB, GARG, Orthopnea for approx 1 month with no improvement on Lasix 20 qD PRN  B/l crackles ausculated with 3+ pitting edema  BNP 6930  EKG: NSR  CXR: mod b/l pleural effusion & central CHF increased from Dec CXR  TTE 2016: Normal LVSF EF 55-60%, Mild MR, G2DD  Cards consulted, Dr. Gilmore    -IV lasix 40 BID  -f/u TTE  -f/u Cards recs  -Admit to tele  -Strict I/Os  -Daily weights  -Fluid restrict 1L

## 2025-01-14 NOTE — H&P ADULT - NSHPPHYSICALEXAM_GEN_ALL_CORE
T(C): 36.8 (01-14-25 @ 15:55), Max: 36.8 (01-14-25 @ 15:55)  HR: 96 (01-14-25 @ 15:55) (96 - 108)  BP: 151/77 (01-14-25 @ 15:55) (135/63 - 151/77)  RR: 16 (01-14-25 @ 15:55) (16 - 16)  SpO2: 94% (01-14-25 @ 15:55) (94% - 94%)    CONSTITUTIONAL: Well groomed, no apparent distress  EYES: EOMI, No conjunctival or scleral injection, non-icteric  RESP: No respiratory distress, no use of accessory muscles; Bilateral crackles ausculated mid-lower lung  CV: RRR, +S1S2, no MRG  GI: Soft, NT, ND, no rebound, no guarding; no palpable masses; no hepatosplenomegaly; no hernia palpated  Extremities: B/l LE warm to touch with 3+ pitting edema; Clear fluid filled blisters noted on the right LE; TTP of RLE > LLE; Erythema noticed at the right ankle. RLE wrapped in bandage. B/l boots  PSYCH: Appropriate insight/judgment; A+O x 3, mood and affect appropriate, recent/remote memory intact T(C): 36.8 (01-14-25 @ 15:55), Max: 36.8 (01-14-25 @ 15:55)  HR: 96 (01-14-25 @ 15:55) (96 - 108)  BP: 151/77 (01-14-25 @ 15:55) (135/63 - 151/77)  RR: 16 (01-14-25 @ 15:55) (16 - 16)  SpO2: 94% (01-14-25 @ 15:55) (94% - 94%)    CONSTITUTIONAL: Well groomed, no apparent distress  EYES: EOMI, No conjunctival or scleral injection, non-icteric  RESP: No respiratory distress, no use of accessory muscles; Bilateral crackles auscultated mid-lower lung  CV: RRR, +S1S2, no MRG  GI: Soft, NT, ND, no rebound, no guarding; no palpable masses; no hepatosplenomegaly; no hernia palpated  Extremities: B/l LE warm to touch with 3+ pitting edema; Clear fluid filled blisters noted on the right LE; TTP of RLE > LLE; Erythema noticed at the right ankle. RLE wrapped in bandage. B/l boots  PSYCH: Appropriate insight/judgment; A+O x 3, mood and affect appropriate, recent/remote memory intact

## 2025-01-14 NOTE — H&P ADULT - NSHPREVIEWOFSYSTEMS_GEN_ALL_CORE
REVIEW OF SYSTEMS:    CONSTITUTIONAL: No weakness, fevers or chills  EYES/ENT: No visual changes;  No vertigo or throat pain   NECK: No pain or stiffness  RESPIRATORY: + cough, SOB, GARG; No wheezing, hemoptysis  CARDIOVASCULAR: + orthopnea, b/l LE swelling; No chest pain or palpitations  GASTROINTESTINAL: No abdominal or epigastric pain. No nausea, vomiting, or hematemesis; No diarrhea or constipation. No melena or hematochezia.  GENITOURINARY: +Decreased urine output; No dysuria, frequency or hematuria  NEUROLOGICAL: No numbness or weakness  SKIN: + blisters on RLE; No itching, burning, rashes, or lesions   All other review of systems is negative unless indicated above.

## 2025-01-14 NOTE — H&P ADULT - NSICDXFAMILYHX_GEN_ALL_CORE_FT
FAMILY HISTORY:  Mother  Still living? No  Family history of CHF (congestive heart failure), Age at diagnosis: Age Unknown

## 2025-01-14 NOTE — ED ADULT NURSE NOTE - OBJECTIVE STATEMENT
Patient presented to the ED complaining of bilateral leg wound and pain. Patient states she was here for the left big toe wound and received IV antibiotics last month. After being discharged around 3 weeks ago, patient states she started to have bilateral leg swelling and right leg blisters with shortness of breath.

## 2025-01-15 LAB
ALBUMIN SERPL ELPH-MCNC: 3 G/DL — LOW (ref 3.5–5)
ALP SERPL-CCNC: 80 U/L — SIGNIFICANT CHANGE UP (ref 40–120)
ALT FLD-CCNC: 18 U/L DA — SIGNIFICANT CHANGE UP (ref 10–60)
ANION GAP SERPL CALC-SCNC: 9 MMOL/L — SIGNIFICANT CHANGE UP (ref 5–17)
AST SERPL-CCNC: 15 U/L — SIGNIFICANT CHANGE UP (ref 10–40)
BASOPHILS # BLD AUTO: 0.07 K/UL — SIGNIFICANT CHANGE UP (ref 0–0.2)
BASOPHILS NFR BLD AUTO: 0.9 % — SIGNIFICANT CHANGE UP (ref 0–2)
BILIRUB SERPL-MCNC: 0.9 MG/DL — SIGNIFICANT CHANGE UP (ref 0.2–1.2)
BUN SERPL-MCNC: 15 MG/DL — SIGNIFICANT CHANGE UP (ref 7–18)
CALCIUM SERPL-MCNC: 8.6 MG/DL — SIGNIFICANT CHANGE UP (ref 8.4–10.5)
CHLORIDE SERPL-SCNC: 100 MMOL/L — SIGNIFICANT CHANGE UP (ref 96–108)
CO2 SERPL-SCNC: 28 MMOL/L — SIGNIFICANT CHANGE UP (ref 22–31)
CREAT SERPL-MCNC: 1.03 MG/DL — SIGNIFICANT CHANGE UP (ref 0.5–1.3)
EGFR: 61 ML/MIN/1.73M2 — SIGNIFICANT CHANGE UP
EOSINOPHIL # BLD AUTO: 0.55 K/UL — HIGH (ref 0–0.5)
EOSINOPHIL NFR BLD AUTO: 7.1 % — HIGH (ref 0–6)
GLUCOSE BLDC GLUCOMTR-MCNC: 174 MG/DL — HIGH (ref 70–99)
GLUCOSE BLDC GLUCOMTR-MCNC: 253 MG/DL — HIGH (ref 70–99)
GLUCOSE BLDC GLUCOMTR-MCNC: 261 MG/DL — HIGH (ref 70–99)
GLUCOSE BLDC GLUCOMTR-MCNC: 78 MG/DL — SIGNIFICANT CHANGE UP (ref 70–99)
GLUCOSE SERPL-MCNC: 293 MG/DL — HIGH (ref 70–99)
HCT VFR BLD CALC: 32.3 % — LOW (ref 34.5–45)
HGB BLD-MCNC: 11.1 G/DL — LOW (ref 11.5–15.5)
IMM GRANULOCYTES NFR BLD AUTO: 0.3 % — SIGNIFICANT CHANGE UP (ref 0–0.9)
LYMPHOCYTES # BLD AUTO: 2.5 K/UL — SIGNIFICANT CHANGE UP (ref 1–3.3)
LYMPHOCYTES # BLD AUTO: 32.5 % — SIGNIFICANT CHANGE UP (ref 13–44)
MAGNESIUM SERPL-MCNC: 1.8 MG/DL — SIGNIFICANT CHANGE UP (ref 1.6–2.6)
MCHC RBC-ENTMCNC: 25.6 PG — LOW (ref 27–34)
MCHC RBC-ENTMCNC: 34.4 G/DL — SIGNIFICANT CHANGE UP (ref 32–36)
MCV RBC AUTO: 74.6 FL — LOW (ref 80–100)
MONOCYTES # BLD AUTO: 0.48 K/UL — SIGNIFICANT CHANGE UP (ref 0–0.9)
MONOCYTES NFR BLD AUTO: 6.2 % — SIGNIFICANT CHANGE UP (ref 2–14)
NEUTROPHILS # BLD AUTO: 4.08 K/UL — SIGNIFICANT CHANGE UP (ref 1.8–7.4)
NEUTROPHILS NFR BLD AUTO: 53 % — SIGNIFICANT CHANGE UP (ref 43–77)
NRBC # BLD: 0 /100 WBCS — SIGNIFICANT CHANGE UP (ref 0–0)
PHOSPHATE SERPL-MCNC: 3.3 MG/DL — SIGNIFICANT CHANGE UP (ref 2.5–4.5)
PLATELET # BLD AUTO: 312 K/UL — SIGNIFICANT CHANGE UP (ref 150–400)
POTASSIUM SERPL-MCNC: 3.7 MMOL/L — SIGNIFICANT CHANGE UP (ref 3.5–5.3)
POTASSIUM SERPL-SCNC: 3.7 MMOL/L — SIGNIFICANT CHANGE UP (ref 3.5–5.3)
PROT SERPL-MCNC: 7.6 G/DL — SIGNIFICANT CHANGE UP (ref 6–8.3)
RBC # BLD: 4.33 M/UL — SIGNIFICANT CHANGE UP (ref 3.8–5.2)
RBC # FLD: 15.3 % — HIGH (ref 10.3–14.5)
SODIUM SERPL-SCNC: 137 MMOL/L — SIGNIFICANT CHANGE UP (ref 135–145)
TROPONIN I, HIGH SENSITIVITY RESULT: 882.9 NG/L — HIGH
TROPONIN I, HIGH SENSITIVITY RESULT: 882.9 NG/L — HIGH
TROPONIN I, HIGH SENSITIVITY RESULT: 893.8 NG/L — HIGH
WBC # BLD: 7.7 K/UL — SIGNIFICANT CHANGE UP (ref 3.8–10.5)
WBC # FLD AUTO: 7.7 K/UL — SIGNIFICANT CHANGE UP (ref 3.8–10.5)

## 2025-01-15 PROCEDURE — 99233 SBSQ HOSP IP/OBS HIGH 50: CPT | Mod: GC

## 2025-01-15 RX ORDER — ENOXAPARIN SODIUM 60 MG/.6ML
80 INJECTION INTRAVENOUS; SUBCUTANEOUS EVERY 12 HOURS
Refills: 0 | Status: DISCONTINUED | OUTPATIENT
Start: 2025-01-15 | End: 2025-01-17

## 2025-01-15 RX ORDER — FUROSEMIDE 20 MG
40 TABLET ORAL THREE TIMES A DAY
Refills: 0 | Status: DISCONTINUED | OUTPATIENT
Start: 2025-01-15 | End: 2025-01-16

## 2025-01-15 RX ADMIN — INSULIN GLARGINE-YFGN 30 UNIT(S): 100 INJECTION, SOLUTION SUBCUTANEOUS at 22:50

## 2025-01-15 RX ADMIN — Medication 10 UNIT(S): at 09:08

## 2025-01-15 RX ADMIN — ACETAMINOPHEN 650 MILLIGRAM(S): 80 SOLUTION/ DROPS ORAL at 22:58

## 2025-01-15 RX ADMIN — Medication 40 MILLIGRAM(S): at 22:50

## 2025-01-15 RX ADMIN — Medication 40 MILLIGRAM(S): at 18:34

## 2025-01-15 RX ADMIN — Medication 3: at 09:08

## 2025-01-15 RX ADMIN — Medication 1: at 12:38

## 2025-01-15 RX ADMIN — Medication 40 MILLIGRAM(S): at 12:37

## 2025-01-15 RX ADMIN — Medication 10 UNIT(S): at 12:37

## 2025-01-15 RX ADMIN — Medication 1: at 22:50

## 2025-01-15 RX ADMIN — Medication 10 UNIT(S): at 18:35

## 2025-01-15 RX ADMIN — Medication 40 MILLIGRAM(S): at 05:01

## 2025-01-15 NOTE — PROGRESS NOTE ADULT - PROBLEM SELECTOR PLAN 3
P/w dry cough only; no hx of fever, chills, cough, congestion  Positive for COVID, asymptomatic, RA  -Isolation Precautions  -No other interventions at this time

## 2025-01-15 NOTE — CONSULT NOTE ADULT - SUBJECTIVE AND OBJECTIVE BOX
MR:- [id number] :  NAME:-TOMASZ ALBA:-    DATE OF SERVICE:01-15-25 @ 10:50    Patient was seen,examined and evaluated  by Yordy Gilmore MD hi06-99-25 @ 10:50 .  ER evaluation, Labs and Hospital course was reviewed,    CHIEF COMPLAINT:    HPI:HPI:  64F, lives at home and ambulates independently, with hx of diabetic foot ulcer and T2DM, p/w dry cough, SOB, GARG, orthopnea. Endorses symptoms started 1 day after prior discharge from [facility] in December 2024 for diabetic foot ulcer. 1 week ago, noticed her SOB was worsening and her legs were swelling up for which she went to see her PCP who gave her Lasix 20 qD PRN. Saw no improvement with the Lasix and endorses having decreased urine output. Patient has no prior cardiac history, hx of CAD, stents and does not follow a cardiologist. Denies any CP, syncope, dizziness. Patient c/o bilateral leg pain R>L with new onset of clear-filled fluid blisters on the RLE that started 3 days ago. Has not experienced any fever, redness but c/o toes in both of her feet burning.    Patient endorses not having traveled recently and has had no sick contacts. Denies associated symptoms of fever, chills, runny nose, congestion, sore throat.  (14 Jan 2025 17:26)        CARDIAC HISTORY:  [ ] CAD [ [PCI [ ] CABG [ ] Prior Cath  [ ] Atrial Fibrillation  Devices[ ] PPM [ ] ICD [ ]ILR  Heart Failure [ ] HFrEF [ ] HFpEF    PAST MEDICAL & SURGICAL HISTORY:  DM (diabetes mellitus)      Diabetic foot ulcer      Cataract of both eyes, unspecified cataract type      History of cholecystectomy          MEDICATIONS  (STANDING):  dextrose 5%. 1000 milliLiter(s) (50 mL/Hr) IV Continuous <Continuous>  dextrose 5%. 1000 milliLiter(s) (100 mL/Hr) IV Continuous <Continuous>  dextrose 50% Injectable 25 Gram(s) IV Push once  dextrose 50% Injectable 12.5 Gram(s) IV Push once  dextrose 50% Injectable 25 Gram(s) IV Push once  enoxaparin Injectable 80 milliGRAM(s) SubCutaneous every 12 hours  furosemide   Injectable 40 milliGRAM(s) IV Push two times a day  glucagon  Injectable 1 milliGRAM(s) IntraMuscular once  insulin glargine Injectable (LANTUS) 30 Unit(s) SubCutaneous at bedtime  insulin lispro (ADMELOG) corrective regimen sliding scale   SubCutaneous three times a day before meals  insulin lispro (ADMELOG) corrective regimen sliding scale   SubCutaneous at bedtime  insulin lispro Injectable (ADMELOG) 10 Unit(s) SubCutaneous three times a day before meals    MEDICATIONS  (PRN):  acetaminophen     Tablet .. 650 milliGRAM(s) Oral every 6 hours PRN Temp greater or equal to 38C (100.4F), Mild Pain (1 - 3)  dextrose Oral Gel 15 Gram(s) Oral once PRN Blood Glucose LESS THAN 70 milliGRAM(s)/deciliter  ondansetron Injectable 4 milliGRAM(s) IV Push every 8 hours PRN Nausea and/or Vomiting      FAMILY HISTORY:  Family history of CHF (congestive heart failure) (Mother)      No family history of premature coronary artery disease or sudden cardiac death    SOCIAL HISTORY:  Smoking-[ ] Active  [ ] Former [ ] Non Smoker  Alcohol-[ ] Denies [ ] Social [ ] Daily  Ilicit Drug use-[ ] Denies [ ] Active user    REVIEW OF SYSTEMS:  Constitutional: [ ] fever, [ ]weight loss, [ ]fatigue   Activity [ ] Bedbound,[ ] Ambulates [ ] Unassisted[ ] Cane/Walker [ ] Assistence.  Effort tolerance:[ ] Excellent [ ] Good [ ] Fair [ ] Poor [ ]  Eyes: [ ] visual changes  Respiratory: [ ]shortness of breath;  [ ] cough, [ ]wheezing, [ ]chills, [ ]hemoptysis  Cardiovascular: [ ] chest pain, [ ]palpitations, [ ]dizziness,  [ ]leg swelling[ ]orthopnea [ ]PND  Gastrointestinal: [ ] abdominal pain, [ ]nausea, [ ]vomiting,  [ ]diarrhea,[ ]constipation  Genitourinary: [ ] dysuria, [ ] hematuria  Neurologic: [ ] headaches [ ] tremors[ ] weakness  Skin: [ ] itching, [ ]burning, [ ] rashes  Endocrine: [ ] heat or cold intolerance  Musculoskeletal: [ ] joint pain or swelling; [ ] muscle, back, or extremity pain  Psychiatric: [ ] depression, [ ]anxiety, [ ]mood swings, or [ ]difficulty sleeping  Hematologic: [ ] easy bruising, [ ] bleeding gums       [ x] All others negative	  [ ] Unable to obtain    Vital Signs Last 24 Hrs  T(C): 36.8 (15 Kwasi 2025 08:00), Max: 36.9 (15 Kwasi 2025 00:52)  T(F): 98.3 (15 Kwasi 2025 08:00), Max: 98.5 (15 Kwasi 2025 04:35)  HR: 91 (15 Kwasi 2025 08:00) (85 - 108)  BP: 143/81 (15 Kwasi 2025 08:00) (135/63 - 151/77)  BP(mean): 93 (15 Kwasi 2025 04:35) (93 - 99)  RR: 20 (15 Kwasi 2025 08:00) (16 - 20)  SpO2: 96% (15 Kwasi 2025 08:00) (94% - 96%)    Parameters below as of 15 Kwasi 2025 08:00  Patient On (Oxygen Delivery Method): room air      I&O's Summary      PHYSICAL EXAM:  General: No acute distress BMI-  HEENT: EOMI, PERRL[ ] Icteric  Neck: Supple, [ ] JVD  Lungs: Equal air entry bilaterally; [ ] Rales [ ] Rhonchi [ ] Wheezing  Heart: Regular rate and rhythm;[ ] Murmurs-   /6 [ ] Systolic [ ] Diastolic [ ] Radiation,No rubs, or gallops  Abdomen: Nontender, bowel sounds present  Extremities: No clubbing, cyanosis, [ ] edema[ ] Calf tenderness  Nervous system:  Alert & Oriented X3, no focal deficits  Psychiatric: Normal affect  Skin: No rashes or lesions      LABS:  01-15    137  |  100  |  15  ----------------------------<  293[H]  3.7   |  28  |  1.03    Ca    8.6      15 Kwasi 2025 03:50  Phos  3.3     01-15  Mg     1.8     01-15    TPro  7.6  /  Alb  3.0[L]  /  TBili  0.9  /  DBili  x   /  AST  15  /  ALT  18  /  AlkPhos  80  01-15    Creatinine Trend: 1.03<--, 1.09<--, 1.19<--, 1.09<--, 1.14<--, 1.15<--                        11.1   7.70  )-----------( 312      ( 15 Kwasi 2025 03:50 )             32.3     PT/INR - ( 14 Jan 2025 14:00 )   PT: 13.1 sec;   INR: 1.12 ratio         PTT - ( 14 Jan 2025 14:00 )  PTT:28.8 sec    Lipid Panel:   Cardiac Enzymes:           RADIOLOGY:    ECG [my interpretation]:    TELEMETRY:    ECHO:    STRESS TEST:    CATHETERIZATION:

## 2025-01-15 NOTE — PROGRESS NOTE ADULT - PROBLEM SELECTOR PLAN 6
DVT ppx: Lovenox    #### Discharge plan  f/u TTE  f/u endo, card, podiatry reccs.  c/w telemetry, airborne isolation DVT ppx: Lovenox    #### Discharge plan  f/u TTE  f/u endo, card, podiatry reccs.  c/w telemetry, airborne isolation  f/u trop

## 2025-01-15 NOTE — PROGRESS NOTE ADULT - PROBLEM SELECTOR PLAN 2
P/w no CP, but SOB  No prior cardiac history, does not follow cardiologist;   Family Hx of CHF in mother  EKG NSR  Trop 830.4 -> 834.9--> 882  Likely iso demand ischemia  Cards consulted dr. Gilmore  -Admit to tele  -f/u Cards recs P/w no CP, but SOB  No prior cardiac history, does not follow cardiologist;   Family Hx of CHF in mother  EKG NSR  Trop 830.4 -> 834.9--> 882  Likely iso demand ischemia  Cards consulted dr. Gilmore  -Admit to tele  -f/u Cards recs  -repeat Trop until it peaks

## 2025-01-15 NOTE — PROGRESS NOTE ADULT - SUBJECTIVE AND OBJECTIVE BOX
CC: cough, SOB, leg pain  S:  patient has been having shortness of breath and felt that her leg swelling and blisters to her R leg have gotten worse  O:  Vital Signs Last 24 Hrs  T(C): 36.8 (01-15-25 @ 08:00), Max: 36.9 (01-15-25 @ 00:52)  T(F): 98.3 (01-15-25 @ 08:00), Max: 98.5 (01-15-25 @ 04:35)  HR: 91 (01-15-25 @ 08:00) (85 - 96)  BP: 143/81 (01-15-25 @ 08:00) (136/72 - 151/77)  BP(mean): 93 (01-15-25 @ 04:35) (93 - 99)  RR: 20 (01-15-25 @ 08:00) (16 - 20)  SpO2: 96% (01-15-25 @ 08:00) (94% - 96%)    PE:  Gen: Oriented, alert, No acute distress Eyes: conjunctivae and lid normal, sclera clear with no icterus ENT: nose and throat exam normal CVS: S1, S2, RRR; no murmur, no rubs, no gallops Pulm: Good air exchange, Breath sounds equal bilaterally, no rales rhonchi,  no wheezes Chest: nontender, no chest deformity, chest movement symmetrical Gl: abdomen soft, nontender, nondistended, bowel sounds normoactive, no masses palpated Musk: no msk pain, 1+ pitting edema BLE, fluid filled blisters noted to RLE Skin: no skin lesions, skin turgor normal, warm and well perfused Neuro: Awake, alert,Psych: normal affect, insight

## 2025-01-15 NOTE — PROGRESS NOTE ADULT - PROBLEM SELECTOR PLAN 5
Hx of T2DM with insulin pump, using 6-8u TID; Does not take Tresiba 30u  Previous A1c in December >15.5  Endo consulted, Dr. Bhagat  -Will restart previous insulin regimen of Lantus 30 + ISS & Lispro 10TID + ISS  -f/u Endo recs

## 2025-01-15 NOTE — CONSULT NOTE ADULT - ASSESSMENT
A:  Left hallux ulcer  B/l serous bullae 2/2 acute CHF  Left Charcot arthropathy    P:   Patient evaluated and Chart reviewed   Discussed diagnosis and treatment with patient  Venous duplex results reviewed; no DVT.  Previous MRI results reviewed.  New set of left foot x-rays ordered.  Verbal consent obtained for lancing of serous bullae.  Serous bullae lanced using sterile #11 blade.  Applied betadine DSD to serous bullae and L hallux ulcer.  Weight bearing as tolerated to b/l LE  Discussed importance of daily foot examinations and proper shoe gear and to importance of lower Fasting Blood Glucose levels.   Podiatry to follow while in house. Charcot reconstruction for L foot was discussed last admission but shelved due to uncontrolled DM (A1c >15.5). Consider discussing again when patient has achieved better glycemic control. No surgical intervention at this time.  Discussed with Attending Dr. Palma  
64F, lives at home and ambulates independently, with hx of diabetic foot ulcer and T2DM, p/w dry cough, SOB, GARG, orthopnea.   Endocrine consulted for dm management. Pt known to me from prev admission- noncompliant wit meds and diet. States she is compliant lately- treseba 30 and premeal via Cequer 10-14 . Denies hypos

## 2025-01-15 NOTE — CONSULT NOTE ADULT - SUBJECTIVE AND OBJECTIVE BOX
Patient is a 64y old  Female who presents with a chief complaint of Cough & SOB, Leg Pain (15 Kwasi 2025 13:43)      HPI:  64F, lives at home and ambulates independently, with hx of diabetic foot ulcer and T2DM, p/w dry cough, SOB, GARG, orthopnea. Endorses symptoms started 1 day after prior discharge from [facility] in December 2024 for diabetic foot ulcer. 1 week ago, noticed her SOB was worsening and her legs were swelling up for which she went to see her PCP who gave her Lasix 20 qD PRN. Saw no improvement with the Lasix and endorses having decreased urine output. Patient has no prior cardiac history, hx of CAD, stents and does not follow a cardiologist. Denies any CP, syncope, dizziness. Patient c/o bilateral leg pain R>L with new onset of clear-filled fluid blisters on the RLE that started 3 days ago. Has not experienced any fever, redness but c/o toes in both of her feet burning.    Patient endorses not having traveled recently and has had no sick contacts. Denies associated symptoms of fever, chills, runny nose, congestion, sore throat.  (14 Jan 2025 17:26)      Podiatry HPI: Patient is a 64 year old female who podiatry was consulted on for a left toe ulcer and b/l serous blisters. Patient was previously admitted in late December 2024 where podiatry was following for the toe ulcer. Patient states that she had just developed the serous blistering in her legs over the past couple days. She states that she had received a larger dose of Lasix which was helping with the accompanying swelling in her lower extremities but her serous blisters were getting larger and draining a lot of serous fluid. Patient has no other pedal complaints. Patient admits to shortness of breath but denies any other constitutional symptoms.    PMH:DM (diabetes mellitus)    HTN (hypertension)    Cataract of both eyes, unspecified cataract type    Diabetic foot ulcer      Allergies: No Known Allergies    Medications: acetaminophen     Tablet .. 650 milliGRAM(s) Oral every 6 hours PRN  dextrose 5%. 1000 milliLiter(s) IV Continuous <Continuous>  dextrose 5%. 1000 milliLiter(s) IV Continuous <Continuous>  dextrose 50% Injectable 25 Gram(s) IV Push once  dextrose 50% Injectable 12.5 Gram(s) IV Push once  dextrose 50% Injectable 25 Gram(s) IV Push once  dextrose Oral Gel 15 Gram(s) Oral once PRN  enoxaparin Injectable 80 milliGRAM(s) SubCutaneous every 12 hours  furosemide   Injectable 40 milliGRAM(s) IV Push three times a day  glucagon  Injectable 1 milliGRAM(s) IntraMuscular once  insulin glargine Injectable (LANTUS) 30 Unit(s) SubCutaneous at bedtime  insulin lispro (ADMELOG) corrective regimen sliding scale   SubCutaneous three times a day before meals  insulin lispro (ADMELOG) corrective regimen sliding scale   SubCutaneous at bedtime  insulin lispro Injectable (ADMELOG) 10 Unit(s) SubCutaneous three times a day before meals  ondansetron Injectable 4 milliGRAM(s) IV Push every 8 hours PRN    FH:Family history of CHF (congestive heart failure) (Mother)      PSX: No significant past surgical history    Cataract of both eyes, unspecified cataract type    History of cholecystectomy      SH: acetaminophen     Tablet .. 650 milliGRAM(s) Oral every 6 hours PRN  dextrose 5%. 1000 milliLiter(s) IV Continuous <Continuous>  dextrose 5%. 1000 milliLiter(s) IV Continuous <Continuous>  dextrose 50% Injectable 25 Gram(s) IV Push once  dextrose 50% Injectable 12.5 Gram(s) IV Push once  dextrose 50% Injectable 25 Gram(s) IV Push once  dextrose Oral Gel 15 Gram(s) Oral once PRN  enoxaparin Injectable 80 milliGRAM(s) SubCutaneous every 12 hours  furosemide   Injectable 40 milliGRAM(s) IV Push three times a day  glucagon  Injectable 1 milliGRAM(s) IntraMuscular once  insulin glargine Injectable (LANTUS) 30 Unit(s) SubCutaneous at bedtime  insulin lispro (ADMELOG) corrective regimen sliding scale   SubCutaneous three times a day before meals  insulin lispro (ADMELOG) corrective regimen sliding scale   SubCutaneous at bedtime  insulin lispro Injectable (ADMELOG) 10 Unit(s) SubCutaneous three times a day before meals  ondansetron Injectable 4 milliGRAM(s) IV Push every 8 hours PRN      Vital Signs Last 24 Hrs  T(C): 36.8 (15 Kwasi 2025 19:48), Max: 36.9 (15 Kwasi 2025 00:52)  T(F): 98.2 (15 Kwasi 2025 19:48), Max: 98.5 (15 Kwasi 2025 04:35)  HR: 97 (15 Kwasi 2025 19:48) (85 - 97)  BP: 119/61 (15 Kwasi 2025 19:48) (119/61 - 144/70)  BP(mean): 93 (15 Kwasi 2025 04:35) (93 - 99)  RR: 17 (15 Kwasi 2025 19:48) (17 - 20)  SpO2: 100% (15 Kwasi 2025 19:48) (94% - 100%)    Parameters below as of 15 Kwasi 2025 19:48  Patient On (Oxygen Delivery Method): room air        LABS                        11.1   7.70  )-----------( 312      ( 15 Kwasi 2025 03:50 )             32.3               01-15    137  |  100  |  15  ----------------------------<  293[H]  3.7   |  28  |  1.03    Ca    8.6      15 Kwasi 2025 03:50  Phos  3.3     01-15  Mg     1.8     01-15    TPro  7.6  /  Alb  3.0[L]  /  TBili  0.9  /  DBili  x   /  AST  15  /  ALT  18  /  AlkPhos  80  01-15      ROS  REVIEW OF SYSTEMS:    CONSTITUTIONAL: Well groomed, no apparent distress  EYES: EOMI, No conjunctival or scleral injection, non-icteric  RESP: No respiratory distress, no use of accessory muscles; Bilateral crackles auscultated mid-lower lung  CV: RRR, +S1S2, no MRG  GI: Soft, NT, ND, no rebound, no guarding; no palpable masses; no hepatosplenomegaly; no hernia palpated  Extremities: B/l LE warm to touch with 3+ pitting edema; Clear fluid filled blisters noted on the right LE; TTP of RLE > LLE; Erythema noticed at the right ankle. RLE wrapped in bandage. B/l boots  PSYCH: Appropriate insight/judgment; A+O x 3, mood and affect appropriate, recent/remote memory intact      PHYSICAL EXAM  LE Focused:    Vasc: DP pulses palpable 1/4 bilaterally. TG within normal limits bilaterallyPedal hair present. 3+ pitting edema noted diffusely to b/l LE.   Derm: Area of necrosis noted to distal tip of left hallux. Maceration noted on the plantar surface of the left hallux with a small fibrotic ulcer on the plantarmedial aspect of the hallux. No fluctuance, no soft tissue crepitus, no malodor, no drainage, no purulence, no streaking. Large serous bullae noted on the anterior aspect of the legs b/l and dorsolateral aspect of the R foot.  Neuro: Protective sensation diminished bilaterally  MSK: Muscle strength deferred. Negative calf tenderness bilaterally. Mild tenderness upon palpation directly to the left hallux wound.         IMAGING:  < from: MR Foot No Cont, Left (12.13.24 @ 11:42) >  ACC: [id number] EXAM:  MR FOOT LT   ORDERED BY: FARZANA SÁNCHEZ     PROCEDURE DATE:  12/13/2024          INTERPRETATION:  Exam Type: MR FOOT LEFT  Exam Date and Time: 12/13/2024 11:42 AM  Indication: Left hallux wound. Concern for osteomyelitis.  Comparison: Foot x-rays from one day prior.    TECHNIQUE:  Multiplanar multisequence MRI of the left ankle was performed.    FINDINGS:  There is a soft tissue wound about the tip of the great toe. Mild   surrounding inflammatory changes are present. There is no organized fluid   collection seen at this location. Deep to the wound, there is no low T1   signal identified within the distal phalanx of the great toe. Scattered   patchy high STIR signal is seen. There is no fracture or dislocation.   There are findings of early Charcot arthropathy at the midfoot. There is   no tenosynovitis. Fatty atrophy of the intrinsic forefoot musculature.   Dorsal foot subcutaneous tissue swelling.      IMPRESSION:  Soft tissue wound about the great toe with no low T1 signal and patchy   high STIR signal within the distal phalanx of the great toe deep to the   wound. Findings may reflect reactive osseous changes with early   osteomyelitis also a consideration within the differential diagnosis.    Early Charcot arthropathy at the midfoot.    --- End of Report ---            ZOHRA MADERA MD; Attending Radiologist  This document has been electronically signed. Dec 13 2024 12:06PM    < end of copied text >      CULTURES: N/A

## 2025-01-15 NOTE — PROGRESS NOTE ADULT - ASSESSMENT
A/P:  Pancho Reyes is a 65 yo woman with PMH significant for DM2 c/b diabetic foot ulcer who presents with shortness of breath, cough, orthopnea admitted for acute CHF exacerbation, ACS rule out, COVID.      #Acute CHF exacerbation  #COVID  #NSTEMI 2 likely demand ischemia  #BLE edema  #DM2  -patient with elevated BNP, volume overloaded on exam, will obtain TTE   -continue IV lasix 40 mg TID  -endocine consult for DM management  -troponin elevated, likely demand ischemia, trend troponin until it peaks   -monitor tele  -cards consulted  -podiatry consult to eval DM foot ulcer  -elevated BLE  Labs reviewed:                         11.1   7.70  )-----------( 312      ( 15 Kwasi 2025 03:50 )             32.3   01-15    137  |  100  |  15  ----------------------------<  293[H]  3.7   |  28  |  1.03    Ca    8.6      15 Kwasi 2025 03:50  Phos  3.3     01-15  Mg     1.8     01-15    TPro  7.6  /  Alb  3.0[L]  /  TBili  0.9  /  DBili  x   /  AST  15  /  ALT  18  /  AlkPhos  80  01-15    Imaging reviewed:   US Bilateral Doppler"  IMPRESSION:  No evidence of deep venous thrombosis in either lower extremity.    CXR"   IMPRESSION: Moderate bilateral effusions and central CHF are new since   prior.      Total Time Spent 50  minutes  This includes chart review, patient assessment, discussion and collaboration with interdisciplinary team members.

## 2025-01-15 NOTE — CONSULT NOTE ADULT - SUBJECTIVE AND OBJECTIVE BOX
Patient is a 64y old  Female who presents with a chief complaint of Cough & SOB, Leg Pain (14 Jan 2025 17:26)      HPI:  64F, lives at home and ambulates independently, with hx of diabetic foot ulcer and T2DM, p/w dry cough, SOB, GARG, orthopnea. Endorses symptoms started 1 day after prior discharge from [facility] in December 2024 for diabetic foot ulcer. 1 week ago, noticed her SOB was worsening and her legs were swelling up for which she went to see her PCP who gave her Lasix 20 qD PRN. Saw no improvement with the Lasix and endorses having decreased urine output. Patient has no prior cardiac history, hx of CAD, stents and does not follow a cardiologist. Denies any CP, syncope, dizziness. Patient c/o bilateral leg pain R>L with new onset of clear-filled fluid blisters on the RLE that started 3 days ago. Has not experienced any fever, redness but c/o toes in both of her feet burning.    Patient endorses not having traveled recently and has had no sick contacts. Denies associated symptoms of fever, chills, runny nose, congestion, sore throat.  (14 Jan 2025 17:26)      PAST MEDICAL & SURGICAL HISTORY:  DM (diabetes mellitus)      Diabetic foot ulcer      Cataract of both eyes, unspecified cataract type      History of cholecystectomy             MEDICATIONS  (STANDING):  dextrose 5%. 1000 milliLiter(s) (50 mL/Hr) IV Continuous <Continuous>  dextrose 5%. 1000 milliLiter(s) (100 mL/Hr) IV Continuous <Continuous>  dextrose 50% Injectable 25 Gram(s) IV Push once  dextrose 50% Injectable 12.5 Gram(s) IV Push once  dextrose 50% Injectable 25 Gram(s) IV Push once  enoxaparin Injectable 80 milliGRAM(s) SubCutaneous every 12 hours  furosemide   Injectable 40 milliGRAM(s) IV Push two times a day  glucagon  Injectable 1 milliGRAM(s) IntraMuscular once  insulin glargine Injectable (LANTUS) 30 Unit(s) SubCutaneous at bedtime  insulin lispro (ADMELOG) corrective regimen sliding scale   SubCutaneous three times a day before meals  insulin lispro (ADMELOG) corrective regimen sliding scale   SubCutaneous at bedtime  insulin lispro Injectable (ADMELOG) 10 Unit(s) SubCutaneous three times a day before meals    MEDICATIONS  (PRN):  acetaminophen     Tablet .. 650 milliGRAM(s) Oral every 6 hours PRN Temp greater or equal to 38C (100.4F), Mild Pain (1 - 3)  dextrose Oral Gel 15 Gram(s) Oral once PRN Blood Glucose LESS THAN 70 milliGRAM(s)/deciliter  ondansetron Injectable 4 milliGRAM(s) IV Push every 8 hours PRN Nausea and/or Vomiting      FAMILY HISTORY:  Family history of CHF (congestive heart failure) (Mother)        SOCIAL HISTORY:      REVIEW OF SYSTEMS:  CONSTITUTIONAL: No fever, weight loss, or fatigue  EYES: No eye pain, visual disturbances, or discharge  ENT:  No difficulty hearing, tinnitus, vertigo; No sinus or throat pain  NECK: No pain or stiffness  RESPIRATORY: No cough, wheezing, chills or hemoptysis; No Shortness of Breath  CARDIOVASCULAR: No chest pain, palpitations, passing out, dizziness, or leg swelling  GASTROINTESTINAL: No abdominal or epigastric pain. No nausea, vomiting, or hematemesis; No diarrhea or constipation. No melena or hematochezia.  GENITOURINARY: No dysuria, frequency, hematuria, or incontinence  NEUROLOGICAL: No headaches, memory loss, loss of strength, numbness, or tremors  SKIN: No itching, burning, rashes, or lesions   LYMPH Nodes: No enlarged glands  ENDOCRINE: No heat or cold intolerance; No hair loss  MUSCULOSKELETAL: No joint pain or swelling; No muscle, back, or extremity pain  PSYCHIATRIC: No depression, anxiety, mood swings, or difficulty sleeping  HEME/LYMPH: No easy bruising, or bleeding gums  ALLERGY AND IMMUNOLOGIC: No hives or eczema	        Vital Signs Last 24 Hrs  T(C): 36.8 (15 Kwasi 2025 08:00), Max: 36.9 (15 Kwasi 2025 00:52)  T(F): 98.3 (15 Kwasi 2025 08:00), Max: 98.5 (15 Kwasi 2025 04:35)  HR: 91 (15 Kwasi 2025 08:00) (85 - 108)  BP: 143/81 (15 Kwasi 2025 08:00) (135/63 - 151/77)  BP(mean): 93 (15 Kwasi 2025 04:35) (93 - 99)  RR: 20 (15 Kwasi 2025 08:00) (16 - 20)  SpO2: 96% (15 Kwasi 2025 08:00) (94% - 96%)    Parameters below as of 15 Kwasi 2025 08:00  Patient On (Oxygen Delivery Method): room air          Constitutional:    NC/AT:    HEENT:    Neck:  No JVD, bruits or thyromegaly    Respiratory:  Clear without rales or rhonchi    Cardiovascular:  RR without murmur, rub or gallop.    Gastrointestinal: Soft without hepatosplenomegaly.    Extremities: without cyanosis, clubbing or edema.    Neurological:  Oriented   x      . No gross sensory or motor defects.        LABS:                        11.1   7.70  )-----------( 312      ( 15 Kwasi 2025 03:50 )             32.3     01-15    137  |  100  |  15  ----------------------------<  293[H]  3.7   |  28  |  1.03    Ca    8.6      15 Kwasi 2025 03:50  Phos  3.3     01-15  Mg     1.8     01-15    TPro  7.6  /  Alb  3.0[L]  /  TBili  0.9  /  DBili  x   /  AST  15  /  ALT  18  /  AlkPhos  80  01-15        PT/INR - ( 14 Jan 2025 14:00 )   PT: 13.1 sec;   INR: 1.12 ratio         PTT - ( 14 Jan 2025 14:00 )  PTT:28.8 sec  Urinalysis Basic - ( 15 Kwasi 2025 03:50 )    Color: x / Appearance: x / SG: x / pH: x  Gluc: 293 mg/dL / Ketone: x  / Bili: x / Urobili: x   Blood: x / Protein: x / Nitrite: x   Leuk Esterase: x / RBC: x / WBC x   Sq Epi: x / Non Sq Epi: x / Bacteria: x      CAPILLARY BLOOD GLUCOSE  253 (15 Kwasi 2025 08:19)      POCT Blood Glucose.: 253 mg/dL (15 Kwasi 2025 09:04)  POCT Blood Glucose.: 267 mg/dL (14 Jan 2025 22:31)      RADIOLOGY & ADDITIONAL STUDIES: Patient is a 64y old  Female who presents with a chief complaint of Cough & SOB, Leg Pain (14 Jan 2025 17:26)      HPI:  64F, lives at home and ambulates independently, with hx of diabetic foot ulcer and T2DM, p/w dry cough, SOB, GARG, orthopnea. Endorses symptoms started 1 day after prior discharge from [facility] in December 2024 for diabetic foot ulcer. 1 week ago, noticed her SOB was worsening and her legs were swelling up for which she went to see her PCP who gave her Lasix 20 qD PRN. Saw no improvement with the Lasix and endorses having decreased urine output. Patient has no prior cardiac history, hx of CAD, stents and does not follow a cardiologist. Denies any CP, syncope, dizziness. Patient c/o bilateral leg pain R>L with new onset of clear-filled fluid blisters on the RLE that started 3 days ago. Has not experienced any fever, redness but c/o toes in both of her feet burning.    Patient endorses not having traveled recently and has had no sick contacts. Denies associated symptoms of fever, chills, runny nose, congestion, sore throat.  (14 Jan 2025 17:26)  Endocrine consulted for dm management. Pt known to me from prev admission- noncompliant wit meds and diet. States she is compliant lately- treseba 30 and premeal via Cequer 10-14 . Denies hypos    PAST MEDICAL & SURGICAL HISTORY:  DM (diabetes mellitus)      Diabetic foot ulcer      Cataract of both eyes, unspecified cataract type      History of cholecystectomy             MEDICATIONS  (STANDING):  dextrose 5%. 1000 milliLiter(s) (50 mL/Hr) IV Continuous <Continuous>  dextrose 5%. 1000 milliLiter(s) (100 mL/Hr) IV Continuous <Continuous>  dextrose 50% Injectable 25 Gram(s) IV Push once  dextrose 50% Injectable 12.5 Gram(s) IV Push once  dextrose 50% Injectable 25 Gram(s) IV Push once  enoxaparin Injectable 80 milliGRAM(s) SubCutaneous every 12 hours  furosemide   Injectable 40 milliGRAM(s) IV Push two times a day  glucagon  Injectable 1 milliGRAM(s) IntraMuscular once  insulin glargine Injectable (LANTUS) 30 Unit(s) SubCutaneous at bedtime  insulin lispro (ADMELOG) corrective regimen sliding scale   SubCutaneous three times a day before meals  insulin lispro (ADMELOG) corrective regimen sliding scale   SubCutaneous at bedtime  insulin lispro Injectable (ADMELOG) 10 Unit(s) SubCutaneous three times a day before meals    MEDICATIONS  (PRN):  acetaminophen     Tablet .. 650 milliGRAM(s) Oral every 6 hours PRN Temp greater or equal to 38C (100.4F), Mild Pain (1 - 3)  dextrose Oral Gel 15 Gram(s) Oral once PRN Blood Glucose LESS THAN 70 milliGRAM(s)/deciliter  ondansetron Injectable 4 milliGRAM(s) IV Push every 8 hours PRN Nausea and/or Vomiting      FAMILY HISTORY:  Family history of CHF (congestive heart failure) (Mother)        SOCIAL HISTORY:      REVIEW OF SYSTEMS:  CONSTITUTIONAL: No fever, weight loss, or fatigue  EYES: No eye pain, visual disturbances, or discharge  ENT:  No difficulty hearing, tinnitus, vertigo; No sinus or throat pain  NECK: No pain or stiffness  RESPIRATORY: No cough, wheezing, chills or hemoptysis; No Shortness of Breath  CARDIOVASCULAR: No chest pain, palpitations, passing out, dizziness, or leg swelling  GASTROINTESTINAL: No abdominal or epigastric pain. No nausea, vomiting, or hematemesis; No diarrhea or constipation. No melena or hematochezia.  GENITOURINARY: No dysuria, frequency, hematuria, or incontinence  NEUROLOGICAL: No headaches, memory loss, loss of strength, numbness, or tremors  SKIN: No itching, burning, rashes, or lesions   LYMPH Nodes: No enlarged glands  ENDOCRINE: No heat or cold intolerance; No hair loss  MUSCULOSKELETAL: No joint pain or swelling; No muscle, back, or extremity pain  PSYCHIATRIC: No depression, anxiety, mood swings, or difficulty sleeping  HEME/LYMPH: No easy bruising, or bleeding gums  ALLERGY AND IMMUNOLOGIC: No hives or eczema	        Vital Signs Last 24 Hrs  T(C): 36.8 (15 Kwasi 2025 08:00), Max: 36.9 (15 Kwasi 2025 00:52)  T(F): 98.3 (15 Kwasi 2025 08:00), Max: 98.5 (15 Kwasi 2025 04:35)  HR: 91 (15 Kwasi 2025 08:00) (85 - 108)  BP: 143/81 (15 Kwasi 2025 08:00) (135/63 - 151/77)  BP(mean): 93 (15 Kwasi 2025 04:35) (93 - 99)  RR: 20 (15 Kwasi 2025 08:00) (16 - 20)  SpO2: 96% (15 Kwasi 2025 08:00) (94% - 96%)    Parameters below as of 15 Kwasi 2025 08:00  Patient On (Oxygen Delivery Method): room air          Constitutional:    HEENT: nad    Neck:  No JVD, bruits or thyromegaly    Respiratory:  Clear without rales or rhonchi    Cardiovascular:  RR without murmur, rub or gallop.    Gastrointestinal: Soft without hepatosplenomegaly.    Extremities: without cyanosis, clubbing or edema.    Neurological:  Oriented   x    3  . No gross sensory or motor defects.        LABS:                        11.1   7.70  )-----------( 312      ( 15 Kwasi 2025 03:50 )             32.3     01-15    137  |  100  |  15  ----------------------------<  293[H]  3.7   |  28  |  1.03    Ca    8.6      15 Kwasi 2025 03:50  Phos  3.3     01-15  Mg     1.8     01-15    TPro  7.6  /  Alb  3.0[L]  /  TBili  0.9  /  DBili  x   /  AST  15  /  ALT  18  /  AlkPhos  80  01-15        PT/INR - ( 14 Jan 2025 14:00 )   PT: 13.1 sec;   INR: 1.12 ratio         PTT - ( 14 Jan 2025 14:00 )  PTT:28.8 sec  Urinalysis Basic - ( 15 Kwasi 2025 03:50 )    Color: x / Appearance: x / SG: x / pH: x  Gluc: 293 mg/dL / Ketone: x  / Bili: x / Urobili: x   Blood: x / Protein: x / Nitrite: x   Leuk Esterase: x / RBC: x / WBC x   Sq Epi: x / Non Sq Epi: x / Bacteria: x      CAPILLARY BLOOD GLUCOSE  253 (15 Kwasi 2025 08:19)      POCT Blood Glucose.: 253 mg/dL (15 Kwasi 2025 09:04)  POCT Blood Glucose.: 267 mg/dL (14 Jan 2025 22:31)      RADIOLOGY & ADDITIONAL STUDIES:

## 2025-01-15 NOTE — PROGRESS NOTE ADULT - PROBLEM SELECTOR PLAN 1
P/w SOB, GARG, Orthopnea for approx 1 month with no improvement on Lasix 20 qD PRN  B/l crackles ausculated with 3+ pitting edema  BNP 6930  EKG: NSR  CXR: mod b/l pleural effusion & central CHF increased from Dec CXR  TTE 2016: Normal LVSF EF 55-60%, Mild MR, G2DD  Cards consulted, Dr. Gilmore  -Admit to tele  -Strict I/Os  -Daily weights  -Fluid restrict 1L  -IV lasix 40 BID  -f/u TTE  -f/u Cards recs : Dr. Gilmore

## 2025-01-15 NOTE — CONSULT NOTE ADULT - PROBLEM SELECTOR RECOMMENDATION 9
hyperglycemic   rec lantus 30 units   d/c Cequer while in patient  cont admelog 10 units ac tid  fsg ac and hs  repeat a1c  compliance d/w pt

## 2025-01-15 NOTE — PROGRESS NOTE ADULT - ASSESSMENT
64F, hx of  [id number], previous admission for diabetic foot ulcer in Dec 2024 presenting with SOB, GARG, orthopnea &new fluid filled blisters on RLE.   Admitted for acute CHF exacerbation, uptrended trop to ACS r/o, COVID management and follow-up with podiatry regarding new skin changes to LE. c/w Telemetry.  Cardiology and Endo consulted.

## 2025-01-15 NOTE — PATIENT PROFILE ADULT - FALL HARM RISK - HARM RISK INTERVENTIONS

## 2025-01-15 NOTE — PROGRESS NOTE ADULT - SUBJECTIVE AND OBJECTIVE BOX
NP Note discussed with  Primary Attending    INTERVAL HPI/OVERNIGHT EVENTS: no new complaints, sob resolved.     MEDICATIONS  (STANDING):  dextrose 5%. 1000 milliLiter(s) (50 mL/Hr) IV Continuous <Continuous>  dextrose 5%. 1000 milliLiter(s) (100 mL/Hr) IV Continuous <Continuous>  dextrose 50% Injectable 25 Gram(s) IV Push once  dextrose 50% Injectable 12.5 Gram(s) IV Push once  dextrose 50% Injectable 25 Gram(s) IV Push once  enoxaparin Injectable 80 milliGRAM(s) SubCutaneous every 12 hours  furosemide   Injectable 40 milliGRAM(s) IV Push three times a day  glucagon  Injectable 1 milliGRAM(s) IntraMuscular once  insulin glargine Injectable (LANTUS) 30 Unit(s) SubCutaneous at bedtime  insulin lispro (ADMELOG) corrective regimen sliding scale   SubCutaneous three times a day before meals  insulin lispro (ADMELOG) corrective regimen sliding scale   SubCutaneous at bedtime  insulin lispro Injectable (ADMELOG) 10 Unit(s) SubCutaneous three times a day before meals    MEDICATIONS  (PRN):  acetaminophen     Tablet .. 650 milliGRAM(s) Oral every 6 hours PRN Temp greater or equal to 38C (100.4F), Mild Pain (1 - 3)  dextrose Oral Gel 15 Gram(s) Oral once PRN Blood Glucose LESS THAN 70 milliGRAM(s)/deciliter  ondansetron Injectable 4 milliGRAM(s) IV Push every 8 hours PRN Nausea and/or Vomiting      __________________________________________________  REVIEW OF SYSTEMS:    CONSTITUTIONAL: No fever,   EYES: no acute visual disturbances  NECK: No pain or stiffness  RESPIRATORY: No cough; No shortness of breath  CARDIOVASCULAR: No chest pain, no palpitations  GASTROINTESTINAL: No pain. No nausea or vomiting; No diarrhea   NEUROLOGICAL: No headache or numbness, no tremors  MUSCULOSKELETAL: No joint pain, no muscle pain  GENITOURINARY: no dysuria, no frequency, no hesitancy  PSYCHIATRY: no depression , no anxiety  ALL OTHER  ROS negative        Vital Signs Last 24 Hrs  T(C): 36.8 (15 Kwasi 2025 08:00), Max: 36.9 (15 Kwasi 2025 00:52)  T(F): 98.3 (15 Kwasi 2025 08:00), Max: 98.5 (15 Kwasi 2025 04:35)  HR: 91 (15 Kwasi 2025 08:00) (85 - 96)  BP: 143/81 (15 Kwasi 2025 08:00) (136/72 - 151/77)  BP(mean): 93 (15 Kwasi 2025 04:35) (93 - 99)  RR: 20 (15 Kwasi 2025 08:00) (16 - 20)  SpO2: 96% (15 Kwasi 2025 08:00) (94% - 96%)    Parameters below as of 15 Kwasi 2025 08:00  Patient On (Oxygen Delivery Method): room air        ________________________________________________  PHYSICAL EXAM:  GENERAL: NAD  HEENT: Normocephalic;  conjunctivae and sclerae clear; moist mucous membranes;   NECK : supple  CHEST/LUNG: Clear to auscultation bilaterally with good air entry   HEART: S1 S2  regular; no murmurs, gallops or rubs  ABDOMEN: Soft, Nontender, Nondistended; Bowel sounds present  EXTREMITIES: no cyanosis; BLE lower leg pitting edema R>L; no calf tenderness  SKIN: warm and dry; multiple blisters R> L, weeping, left great toe ulceration, dry. b/l LE erythema  NERVOUS SYSTEM:  Awake and alert; Oriented  to place, person and time ; no new deficits    _________________________________________________  LABS:                        11.1   7.70  )-----------( 312      ( 15 Kwasi 2025 03:50 )             32.3     01-15    137  |  100  |  15  ----------------------------<  293[H]  3.7   |  28  |  1.03    Ca    8.6      15 Kwasi 2025 03:50  Phos  3.3     01-15  Mg     1.8     01-15    TPro  7.6  /  Alb  3.0[L]  /  TBili  0.9  /  DBili  x   /  AST  15  /  ALT  18  /  AlkPhos  80  01-15    PT/INR - ( 14 Jan 2025 14:00 )   PT: 13.1 sec;   INR: 1.12 ratio         PTT - ( 14 Jan 2025 14:00 )  PTT:28.8 sec  Urinalysis Basic - ( 15 Kwasi 2025 03:50 )    Color: x / Appearance: x / SG: x / pH: x  Gluc: 293 mg/dL / Ketone: x  / Bili: x / Urobili: x   Blood: x / Protein: x / Nitrite: x   Leuk Esterase: x / RBC: x / WBC x   Sq Epi: x / Non Sq Epi: x / Bacteria: x      CAPILLARY BLOOD GLUCOSE  174 (15 Kwasi 2025 12:27)  253 (15 Kwasi 2025 08:19)      POCT Blood Glucose.: 174 mg/dL (15 Kwasi 2025 12:25)  POCT Blood Glucose.: 253 mg/dL (15 Kwasi 2025 09:04)  POCT Blood Glucose.: 267 mg/dL (14 Jan 2025 22:31)        RADIOLOGY & ADDITIONAL TESTS:    Imaging  Reviewed:  YES    < from: US Duplex Venous Lower Ext Complete, Bilateral (01.14.25 @ 17:14) >    ACC: [id number] EXAM:  US DPLX LWR EXT VEINS COMPL BI   ORDERED BY: JOSIAH CHIN     PROCEDURE DATE:  01/14/2025          INTERPRETATION:  CLINICAL INFORMATION: Lower extremity swelling    COMPARISON: None available.    TECHNIQUE: Duplexsonography of the BILATERAL LOWER extremity veins with   color and spectral Doppler, with and without compression.    FINDINGS:    RIGHT:  Normal compressibility of the RIGHT common femoral, femoral and popliteal   veins.  Doppler examination shows normal spontaneous and phasic flow.  No RIGHT calf vein thrombosis is detected.    LEFT:  Normal compressibility of the LEFT common femoral, femoral and popliteal   veins.  Doppler examination shows normal spontaneous and phasic flow.  No LEFT calf vein thrombosis is detected.    IMPRESSION:  No evidence of deep venous thrombosis in either lower extremity.  --- End of Report ---            RADHA TOVAR MD; Attending Radiologist  This document has been electronically signed. Jan 14 2025  5:16PM    < end of copied text >  < from: Xray Chest 1 View-PORTABLE IMMEDIATE (Xray Chest 1 View-PORTABLE IMMEDIATE .) (01.14.25 @ 15:49) >    ACC: [id number] EXAM:  XR CHEST PORTABLE IMMED 1V   ORDERED BY: JOSIAH CHIN     PROCEDURE DATE:  01/14/2025          INTERPRETATION:  AP erect chest on January 14, 2025 at 3:33 PM. Patient   is short of breath.    Heart magnified by technique.    There are moderate bibasilar effusions and central CHF markedly increased   from December 12, 2024.    IMPRESSION: Moderate bilateral effusions and central CHF are new since   prior.    --- End of Report ---            KALIN HEATH MD; Attending Radiologist  This document has been electronically signed. Jan 14 2025  3:52PM    < end of copied text >  Consultant(s) Notes Reviewed:   YES      Plan of care was discussed with patient and /or primary care giver; all questions and concerns were addressed

## 2025-01-16 ENCOUNTER — RESULT REVIEW (OUTPATIENT)
Age: 65
End: 2025-01-16

## 2025-01-16 LAB
ALBUMIN SERPL ELPH-MCNC: 2.7 G/DL — LOW (ref 3.5–5)
ALP SERPL-CCNC: 85 U/L — SIGNIFICANT CHANGE UP (ref 40–120)
ALT FLD-CCNC: 18 U/L DA — SIGNIFICANT CHANGE UP (ref 10–60)
ANION GAP SERPL CALC-SCNC: 4 MMOL/L — LOW (ref 5–17)
AST SERPL-CCNC: 17 U/L — SIGNIFICANT CHANGE UP (ref 10–40)
BASOPHILS # BLD AUTO: 0.06 K/UL — SIGNIFICANT CHANGE UP (ref 0–0.2)
BASOPHILS NFR BLD AUTO: 0.7 % — SIGNIFICANT CHANGE UP (ref 0–2)
BILIRUB SERPL-MCNC: 0.4 MG/DL — SIGNIFICANT CHANGE UP (ref 0.2–1.2)
BUN SERPL-MCNC: 21 MG/DL — HIGH (ref 7–18)
CALCIUM SERPL-MCNC: 8.6 MG/DL — SIGNIFICANT CHANGE UP (ref 8.4–10.5)
CHLORIDE SERPL-SCNC: 100 MMOL/L — SIGNIFICANT CHANGE UP (ref 96–108)
CO2 SERPL-SCNC: 31 MMOL/L — SIGNIFICANT CHANGE UP (ref 22–31)
CREAT SERPL-MCNC: 1.09 MG/DL — SIGNIFICANT CHANGE UP (ref 0.5–1.3)
EGFR: 57 ML/MIN/1.73M2 — LOW
EOSINOPHIL # BLD AUTO: 0.66 K/UL — HIGH (ref 0–0.5)
EOSINOPHIL NFR BLD AUTO: 8.1 % — HIGH (ref 0–6)
GLUCOSE BLDC GLUCOMTR-MCNC: 107 MG/DL — HIGH (ref 70–99)
GLUCOSE BLDC GLUCOMTR-MCNC: 181 MG/DL — HIGH (ref 70–99)
GLUCOSE BLDC GLUCOMTR-MCNC: 204 MG/DL — HIGH (ref 70–99)
GLUCOSE BLDC GLUCOMTR-MCNC: 221 MG/DL — HIGH (ref 70–99)
GLUCOSE SERPL-MCNC: 196 MG/DL — HIGH (ref 70–99)
HCT VFR BLD CALC: 32.6 % — LOW (ref 34.5–45)
HGB BLD-MCNC: 11.3 G/DL — LOW (ref 11.5–15.5)
IMM GRANULOCYTES NFR BLD AUTO: 0.2 % — SIGNIFICANT CHANGE UP (ref 0–0.9)
LYMPHOCYTES # BLD AUTO: 2.86 K/UL — SIGNIFICANT CHANGE UP (ref 1–3.3)
LYMPHOCYTES # BLD AUTO: 35.1 % — SIGNIFICANT CHANGE UP (ref 13–44)
MAGNESIUM SERPL-MCNC: 1.6 MG/DL — SIGNIFICANT CHANGE UP (ref 1.6–2.6)
MCHC RBC-ENTMCNC: 25.7 PG — LOW (ref 27–34)
MCHC RBC-ENTMCNC: 34.7 G/DL — SIGNIFICANT CHANGE UP (ref 32–36)
MCV RBC AUTO: 74.3 FL — LOW (ref 80–100)
MONOCYTES # BLD AUTO: 0.58 K/UL — SIGNIFICANT CHANGE UP (ref 0–0.9)
MONOCYTES NFR BLD AUTO: 7.1 % — SIGNIFICANT CHANGE UP (ref 2–14)
NEUTROPHILS # BLD AUTO: 3.97 K/UL — SIGNIFICANT CHANGE UP (ref 1.8–7.4)
NEUTROPHILS NFR BLD AUTO: 48.8 % — SIGNIFICANT CHANGE UP (ref 43–77)
NRBC # BLD: 0 /100 WBCS — SIGNIFICANT CHANGE UP (ref 0–0)
PHOSPHATE SERPL-MCNC: 3.8 MG/DL — SIGNIFICANT CHANGE UP (ref 2.5–4.5)
PLATELET # BLD AUTO: 312 K/UL — SIGNIFICANT CHANGE UP (ref 150–400)
POTASSIUM SERPL-MCNC: 3.2 MMOL/L — LOW (ref 3.5–5.3)
POTASSIUM SERPL-SCNC: 3.2 MMOL/L — LOW (ref 3.5–5.3)
PROT SERPL-MCNC: 7.3 G/DL — SIGNIFICANT CHANGE UP (ref 6–8.3)
RBC # BLD: 4.39 M/UL — SIGNIFICANT CHANGE UP (ref 3.8–5.2)
RBC # FLD: 15.1 % — HIGH (ref 10.3–14.5)
SODIUM SERPL-SCNC: 135 MMOL/L — SIGNIFICANT CHANGE UP (ref 135–145)
TROPONIN I, HIGH SENSITIVITY RESULT: 777.5 NG/L — HIGH
TROPONIN I, HIGH SENSITIVITY RESULT: 916.5 NG/L — HIGH
WBC # BLD: 8.15 K/UL — SIGNIFICANT CHANGE UP (ref 3.8–10.5)
WBC # FLD AUTO: 8.15 K/UL — SIGNIFICANT CHANGE UP (ref 3.8–10.5)

## 2025-01-16 PROCEDURE — 99233 SBSQ HOSP IP/OBS HIGH 50: CPT | Mod: GC

## 2025-01-16 PROCEDURE — 73630 X-RAY EXAM OF FOOT: CPT | Mod: 26,LT

## 2025-01-16 PROCEDURE — 93306 TTE W/DOPPLER COMPLETE: CPT | Mod: 26

## 2025-01-16 RX ORDER — SACUBITRIL AND VALSARTAN 24; 26 MG/1; MG/1
1 TABLET, FILM COATED ORAL
Refills: 0 | Status: DISCONTINUED | OUTPATIENT
Start: 2025-01-16 | End: 2025-01-17

## 2025-01-16 RX ORDER — FUROSEMIDE 20 MG
40 TABLET ORAL
Refills: 0 | Status: DISCONTINUED | OUTPATIENT
Start: 2025-01-16 | End: 2025-01-17

## 2025-01-16 RX ORDER — METOPROLOL TARTRATE 50 MG
25 TABLET ORAL DAILY
Refills: 0 | Status: DISCONTINUED | OUTPATIENT
Start: 2025-01-16 | End: 2025-01-17

## 2025-01-16 RX ORDER — POTASSIUM CHLORIDE 600 MG/1
40 TABLET, FILM COATED, EXTENDED RELEASE ORAL EVERY 4 HOURS
Refills: 0 | Status: COMPLETED | OUTPATIENT
Start: 2025-01-16 | End: 2025-01-16

## 2025-01-16 RX ADMIN — Medication 10 UNIT(S): at 08:45

## 2025-01-16 RX ADMIN — Medication 40 MILLIGRAM(S): at 15:17

## 2025-01-16 RX ADMIN — Medication 10 UNIT(S): at 17:43

## 2025-01-16 RX ADMIN — POTASSIUM CHLORIDE 40 MILLIEQUIVALENT(S): 600 TABLET, FILM COATED, EXTENDED RELEASE ORAL at 15:17

## 2025-01-16 RX ADMIN — ACETAMINOPHEN 650 MILLIGRAM(S): 80 SOLUTION/ DROPS ORAL at 22:09

## 2025-01-16 RX ADMIN — INSULIN GLARGINE-YFGN 30 UNIT(S): 100 INJECTION, SOLUTION SUBCUTANEOUS at 21:57

## 2025-01-16 RX ADMIN — Medication 2: at 08:44

## 2025-01-16 RX ADMIN — POTASSIUM CHLORIDE 40 MILLIEQUIVALENT(S): 600 TABLET, FILM COATED, EXTENDED RELEASE ORAL at 17:44

## 2025-01-16 RX ADMIN — Medication 2: at 12:15

## 2025-01-16 RX ADMIN — Medication 10 UNIT(S): at 12:15

## 2025-01-16 RX ADMIN — Medication 40 MILLIGRAM(S): at 06:33

## 2025-01-16 RX ADMIN — ACETAMINOPHEN 650 MILLIGRAM(S): 80 SOLUTION/ DROPS ORAL at 22:35

## 2025-01-16 NOTE — PROGRESS NOTE ADULT - PROBLEM SELECTOR PLAN 6
DVT ppx: Lovenox    #### Discharge plan  f/u TTE  f/u endo, card, podiatry reccs.  c/w telemetry, airborne isolation  f/u trop DVT ppx: Lovenox

## 2025-01-16 NOTE — CHART NOTE - NSCHARTNOTEFT_GEN_A_CORE
Informed by RN that patient is refusing lovenox. Patient assessed with family at bedside. Pt reports that she is ambulatory and does not want the lovenox injection. Informed patient and family that she is on full dose lovenox, and is important for clot prevention, regardless if she ambulates. Patient expressed understanding of risks of not taking lovenox, currently still refusing injection.

## 2025-01-16 NOTE — PROGRESS NOTE ADULT - PROBLEM SELECTOR PLAN 5
Hx of T2DM with insulin pump, using 6-8u TID; Does not take Tresiba 30u  Previous A1c in December >15.5  Endo consulted, Dr. Bhagat  -Will restart previous insulin regimen of Lantus 30 + ISS & Lispro 10TID + ISS  -f/u Endo recs Hx of T2DM with insulin pump, using 6-8u TID; Does not take Tresiba 30u  Previous A1c in December >15.5  Endo consulted, Dr. Bhagat    -Will restart previous insulin regimen of Lantus 30 + ISS & Lispro 10TID + ISS  - per endo, c/w above regimen

## 2025-01-16 NOTE — PROGRESS NOTE ADULT - PROBLEM SELECTOR PLAN 2
P/w no CP, but SOB  No prior cardiac history, does not follow cardiologist;   Family Hx of CHF in mother  EKG NSR  Trop 830.4 -> 834.9--> 882  Likely iso demand ischemia  Cards consulted dr. Gilmore  -Admit to tele  -f/u Cards recs  -repeat Trop until it peaks P/w no CP, but SOB  No prior cardiac history, does not follow cardiologist;   Family Hx of CHF in mother  EKG NSR  Trop 830.4 -> 834.9--> 882 > 894 > 917 > 777  Cards consulted dr. Gilmore    -Admit to tele  -f/u NST in AM

## 2025-01-16 NOTE — PROGRESS NOTE ADULT - PROBLEM SELECTOR PLAN 1
P/w SOB, GARG, Orthopnea for approx 1 month with no improvement on Lasix 20 qD PRN  B/l crackles ausculated with 3+ pitting edema  BNP 6930  EKG: NSR  CXR: mod b/l pleural effusion & central CHF increased from Dec CXR  TTE 2016: Normal LVSF EF 55-60%, Mild MR, G2DD  Cards consulted, Dr. Gilmore  -Admit to tele  -Strict I/Os  -Daily weights  -Fluid restrict 1L  -IV lasix 40 BID  -f/u TTE  -f/u Cards recs : Dr. Gilmore P/w SOB, GARG, Orthopnea for approx 1 month with no improvement on Lasix 20 qD PRN  B/l crackles ausculated with 3+ pitting edema  BNP 6930  EKG: NSR  CXR: mod b/l pleural effusion & central CHF increased from Dec CXR  TTE 2016: Normal LVSF EF 55-60%, Mild MR, G2DD  TTE this admission:  EF 30-35%, G2DD  Cards consulted, Dr. Gilmore  -Admit to tele  -Strict I/Os  -Daily weights  -Fluid restrict 1L  -IV lasix 40 BID  -GDMT: started metoprolol xl 25 QD, entresto low dose

## 2025-01-16 NOTE — PROGRESS NOTE ADULT - SUBJECTIVE AND OBJECTIVE BOX
PATIENT SEEN AND EXAMINED BY KHAI REEVES M.D. ON :- 1/16/25  DATE OF SERVICE:      1/16/25       Interim events noted,Labs ,Radiological studies and Cardiology tests reviewed .    Patient is a 64y old  Female who presents with a chief complaint of Cough & SOB, Leg Pain (16 Jan 2025 11:25)      HPI:  64F, lives at home and ambulates independently, with hx of diabetic foot ulcer and T2DM, p/w dry cough, SOB, GARG, orthopnea. Endorses symptoms started 1 day after prior discharge from [facility] in December 2024 for diabetic foot ulcer. 1 week ago, noticed her SOB was worsening and her legs were swelling up for which she went to see her PCP who gave her Lasix 20 qD PRN. Saw no improvement with the Lasix and endorses having decreased urine output. Patient has no prior cardiac history, hx of CAD, stents and does not follow a cardiologist. Denies any CP, syncope, dizziness. Patient c/o bilateral leg pain R>L with new onset of clear-filled fluid blisters on the RLE that started 3 days ago. Has not experienced any fever, redness but c/o toes in both of her feet burning.    Patient endorses not having traveled recently and has had no sick contacts. Denies associated symptoms of fever, chills, runny nose, congestion, sore throat.  (14 Jan 2025 17:26)      PAST MEDICAL & SURGICAL HISTORY:  DM (diabetes mellitus)      Diabetic foot ulcer      Cataract of both eyes, unspecified cataract type      History of cholecystectomy          PREVIOUS DIAGNOSTIC TESTING:      ECHO  FINDINGS:    STRESS  FINDINGS:    CATHETERIZATION  FINDINGS:    MEDICATIONS  (STANDING):  dextrose 5%. 1000 milliLiter(s) (50 mL/Hr) IV Continuous <Continuous>  dextrose 5%. 1000 milliLiter(s) (100 mL/Hr) IV Continuous <Continuous>  dextrose 50% Injectable 25 Gram(s) IV Push once  dextrose 50% Injectable 12.5 Gram(s) IV Push once  dextrose 50% Injectable 25 Gram(s) IV Push once  enoxaparin Injectable 80 milliGRAM(s) SubCutaneous every 12 hours  furosemide   Injectable 40 milliGRAM(s) IV Push two times a day  glucagon  Injectable 1 milliGRAM(s) IntraMuscular once  insulin glargine Injectable (LANTUS) 30 Unit(s) SubCutaneous at bedtime  insulin lispro (ADMELOG) corrective regimen sliding scale   SubCutaneous three times a day before meals  insulin lispro (ADMELOG) corrective regimen sliding scale   SubCutaneous at bedtime  insulin lispro Injectable (ADMELOG) 10 Unit(s) SubCutaneous three times a day before meals  metoprolol succinate ER 25 milliGRAM(s) Oral daily  sacubitril 24 mG/valsartan 26 mG 1 Tablet(s) Oral two times a day    MEDICATIONS  (PRN):  acetaminophen     Tablet .. 650 milliGRAM(s) Oral every 6 hours PRN Temp greater or equal to 38C (100.4F), Mild Pain (1 - 3)  dextrose Oral Gel 15 Gram(s) Oral once PRN Blood Glucose LESS THAN 70 milliGRAM(s)/deciliter  ondansetron Injectable 4 milliGRAM(s) IV Push every 8 hours PRN Nausea and/or Vomiting      FAMILY HISTORY:  Family history of CHF (congestive heart failure) (Mother)        SOCIAL HISTORY:    CIGARETTES:    ALCOHOL:    REVIEW OF SYSTEMS:  CONSTITUTIONAL: No fever, weight loss, or fatigue  EYES: No eye pain, visual disturbances, or discharge  ENMT:  No difficulty hearing, tinnitus, vertigo; No sinus or throat pain  NECK: No pain or stiffness  RESPIRATORY: No cough, wheezing, chills or hemoptysis; No shortness of breath  CARDIOVASCULAR: No chest pain, palpitations, dizziness, or leg swelling  GASTROINTESTINAL: No abdominal or epigastric pain. No nausea, vomiting, or hematemesis; No diarrhea or constipation. No melena or hematochezia.  GENITOURINARY: No dysuria, frequency, hematuria, or incontinence  NEUROLOGICAL: No headaches, memory loss, loss of strength, numbness, or tremors  SKIN: No itching, burning, rashes, or lesions   LYMPH NODES: No enlarged glands  ENDOCRINE: No heat or cold intolerance; No hair loss  MUSCULOSKELETAL: No joint pain or swelling; No muscle, back, or extremity pain  PSYCHIATRIC: No depression, anxiety, mood swings, or difficulty sleeping  HEME/LYMPH: No easy bruising, or bleeding gums  ALLERY AND IMMUNOLOGIC: No hives or eczema    Vital Signs Last 24 Hrs  T(C): 37 (16 Jan 2025 21:25), Max: 37.1 (16 Jan 2025 11:05)  T(F): 98.6 (16 Jan 2025 21:25), Max: 98.7 (16 Jan 2025 11:05)  HR: 95 (16 Jan 2025 21:25) (83 - 100)  BP: 120/73 (16 Jan 2025 21:25) (102/73 - 145/74)  BP(mean): 84 (16 Jan 2025 18:03) (84 - 84)  RR: 18 (16 Jan 2025 21:25) (17 - 18)  SpO2: 94% (16 Jan 2025 21:25) (92% - 97%)    Parameters below as of 16 Jan 2025 21:25  Patient On (Oxygen Delivery Method): room air          PHYSICAL EXAM:  GENERAL: NAD, well-groomed, well-developed  HEAD:  Atraumatic, Normocephalic  EYES: EOMI, PERRLA, conjunctiva and sclera clear  ENMT: No tonsillar erythema, exudates, or enlargement; Moist mucous membranes, Good dentition, No lesions  NECK: Supple, No JVD, Normal thyroid  NERVOUS SYSTEM:  Alert & Oriented X3, Good concentration; Motor Strength 5/5 B/L upper and lower extremities; DTRs 2+ intact and symmetric  CHEST/LUNG: Clear to percussion bilaterally; No rales, rhonchi, wheezing, or rubs  HEART: Regular rate and rhythm; No murmurs, rubs, or gallops  ABDOMEN: Soft, Nontender, Nondistended; Bowel sounds present  EXTREMITIES:  2+ Peripheral Pulses, No clubbing, cyanosis, or edema  LYMPH: No lymphadenopathy noted  SKIN: No rashes or lesions      INTERPRETATION OF TELEMETRY:    ECG:    [facility]:     LABS:                        11.3   8.15  )-----------( 312      ( 16 Jan 2025 06:05 )             32.6     01-16    135  |  100  |  21[H]  ----------------------------<  196[H]  3.2[L]   |  31  |  1.09    Ca    8.6      16 Jan 2025 06:05  Phos  3.8     01-16  Mg     1.6     01-16    TPro  7.3  /  Alb  2.7[L]  /  TBili  0.4  /  DBili  x   /  AST  17  /  ALT  18  /  AlkPhos  85  01-16          Urinalysis Basic - ( 16 Jan 2025 06:05 )    Color: x / Appearance: x / SG: x / pH: x  Gluc: 196 mg/dL / Ketone: x  / Bili: x / Urobili: x   Blood: x / Protein: x / Nitrite: x   Leuk Esterase: x / RBC: x / WBC x   Sq Epi: x / Non Sq Epi: x / Bacteria: x      Lipid Panel:   I&O's Summary    16 Jan 2025 07:01  -  16 Jan 2025 22:41  --------------------------------------------------------  IN: 900 mL / OUT: 0 mL / NET: 900 mL        RADIOLOGY & ADDITIONAL STUDIES:    < from: TTE W or WO Ultrasound Enhancing Agent (01.16.25 @ 12:45) >     CONCLUSIONS:      1. Left ventricular systolic function is moderately decreased with an ejection fraction visually estimated at 30 to 35 %.   2. There is moderate (grade 2) left ventricular diastolic dysfunction.   3. Mild mitral regurgitation.   4. Trace tricuspid regurgitation.   5. Trace pulmonic regurgitation.   6. Trace pericardial effusion.   7. Right pleural effusion noted.   8. No prior echocardiogram is available for comparison.    < end of copied text >

## 2025-01-16 NOTE — PROGRESS NOTE ADULT - TIME BILLING
- Review of records, telemetry, vital signs and daily labs.   - General and cardiovascular physical examination.  - Generation of cardiovascular treatment plan and completion of note .  - Coordination of care.      Patient was seen and examined by me on 1/16/25 ,interim events noted,labs and radiology studies reviewed.  Yordy Gilmore MD,FACC.  [address] [place], [place] [id number].  [phone]  Availabe to call or text on [employer] TEAMS.

## 2025-01-16 NOTE — PROGRESS NOTE ADULT - ASSESSMENT
64F, hx of  [id number], previous admission for diabetic foot ulcer in Dec 2024 presenting with SOB, GARG, orthopnea &new fluid filled blisters on RLE.   Admitted for acute CHF exacerbation, uptrended trop to ACS r/o, COVID management and follow-up with podiatry regarding new skin changes to LE. c/w Telemetry.  Cardiology and Endo consulted. TTE showed sys+diasys HF, EF 30-35%, started on GDMT, pending NST.    # Acute CHF.   TTE 2016: Normal LVSF EF 55-60%, Mild MR, G2DD  TTE this admission:  EF 30-35%, G2DD   metoprolol xl 25 QD, entresto low dose.    #  ACS (acute coronary syndrome).     -f/u NST

## 2025-01-16 NOTE — PROGRESS NOTE ADULT - ATTENDING COMMENTS
CC: cough, SOB, leg pain  S:  she is feeling better, she feels less short of breath  O:  Vital Signs Last 24 Hrs  T(C): 37 (01-16-25 @ 14:48), Max: 37.1 (01-16-25 @ 11:05)  T(F): 98.6 (01-16-25 @ 14:48), Max: 98.7 (01-16-25 @ 11:05)  HR: 92 (01-16-25 @ 14:48) (83 - 104)  BP: 126/63 (01-16-25 @ 14:48) (102/73 - 145/74)  BP(mean): 91 (01-15-25 @ 22:19) (91 - 91)  RR: 18 (01-16-25 @ 14:48) (17 - 18)  SpO2: 94% (01-16-25 @ 14:48) (92% - 100%)    PE:  Gen: Oriented, alert, No acute distress Eyes: conjunctivae and lid normal, sclera clear with no icterus ENT: nose and throat exam normal CVS: S1, S2, RRR; no murmur, no rubs, no gallops Pulm: Good air exchange, Breath sounds equal bilaterally, no rales rhonchi,  no wheezes Chest: nontender, no chest deformity, chest movement symmetrical Gl: abdomen soft, nontender, nondistended, bowel sounds normoactive, no masses palpated Musk: no msk pain,  BLE dressing c/d/i, trace edema BLE Skin: no skin lesions, skin turgor normal, warm and well perfused Neuro: Awake, alert,Psych: normal affect, insight    Assessment and Plan:   Pancho Reyes is a 65 yo woman with PMH significant for DM2 c/b diabetic foot ulcer who presents with shortness of breath, cough, orthopnea admitted for acute CHF exacerbation, ACS rule out, COVID.      #Acute HFrEF exacerbation  #COVID  #NSTEMI 2 likely demand ischemia  #BLE edema  #DM2  -patient with elevated BNP, volume overloaded on exam, EF 30-35%  -low IV lasix 40 mg from TID to BID  -endocrine consult for DM management  -troponin elevated, likely demand ischemia, peaked at 916, now downtrending  -monitor tele  -cards consulted, rec pharmacological stress test, , start toprol, entresto  -podiatry consulted, s/p serous bullae lanced 1/15, f/u L foot xray  -elevated BLE  Labs reviewed:                                       11.3   8.15  )-----------( 312      ( 16 Jan 2025 06:05 )             32.6   01-16    135  |  100  |  21[H]  ----------------------------<  196[H]  3.2[L]   |  31  |  1.09    Ca    8.6      16 Jan 2025 06:05  Phos  3.8     01-16  Mg     1.6     01-16    TPro  7.3  /  Alb  2.7[L]  /  TBili  0.4  /  DBili  x   /  AST  17  /  ALT  18  /  AlkPhos  85  01-16    Imaging reviewed:   US Bilateral Doppler"  IMPRESSION:  No evidence of deep venous thrombosis in either lower extremity.    CXR"   IMPRESSION: Moderate bilateral effusions and central CHF are new since   prior.      Total Time Spent 50  minutes  This includes chart review, patient assessment, discussion and collaboration with interdisciplinary team members.

## 2025-01-16 NOTE — PROGRESS NOTE ADULT - SUBJECTIVE AND OBJECTIVE BOX
SUBJECTIVE: **TO UPDATE**  No overnight events. Pt seen at bedside, states that they feel "__________." Pt endorses _____. Pt denies headache, fever, chills, SOB, chest pain, cough, abd pain, n/v/d, constipation, dysuria, urinary frequency, back pain, myalgias, weakness, dizziness, gait disturbance, numbness/tingling, blurry vision.      OBJECTIVE:    T(C): 37.1 (01-16-25 @ 11:05), Max: 37.1 (01-16-25 @ 11:05)  HR: 97 (01-16-25 @ 11:05) (83 - 104)  BP: 145/74 (01-16-25 @ 11:05) (102/73 - 145/74)  RR: 17 (01-16-25 @ 11:05) (17 - 18)  SpO2: 97% (01-16-25 @ 11:05) (92% - 100%)            ***TO BE EDITED***  CONSTITUTIONAL: No apparent distress, resting comfortably  EYES: Pupils symmetric, EOMI, No conjunctival or scleral injection, non-icteric  ENMT: Oral mucosa with moist membranes  RESP: No respiratory distress, no use of accessory muscles; CTA b/l, no crackles or wheezes  CV: RRR, +S1S2, no murmurs appreciated; no peripheral edema  GI: Soft, NTND, no rebound, no guarding  MSK: No digital clubbing or cyanosis; Extremities moving equally without additional effort; gait not appreciated  : deferred  SKIN: No rashes or ulcers noted  NEURO: CN II-XII grossly intact   PSYCH: Appropriate insight/judgment; A+O x 3, mood and affect appropriate, recent/remote memory intact    No Known Allergies      acetaminophen     Tablet .. 650 milliGRAM(s) Oral every 6 hours PRN  dextrose 5%. 1000 milliLiter(s) (50 mL/Hr) IV Continuous <Continuous>  dextrose 5%. 1000 milliLiter(s) (100 mL/Hr) IV Continuous <Continuous>  dextrose 50% Injectable 25 Gram(s) IV Push once  dextrose 50% Injectable 12.5 Gram(s) IV Push once  dextrose 50% Injectable 25 Gram(s) IV Push once  dextrose Oral Gel 15 Gram(s) Oral once PRN  enoxaparin Injectable 80 milliGRAM(s) SubCutaneous every 12 hours  furosemide   Injectable 40 milliGRAM(s) IV Push three times a day  glucagon  Injectable 1 milliGRAM(s) IntraMuscular once  insulin glargine Injectable (LANTUS) 30 Unit(s) SubCutaneous at bedtime  insulin lispro (ADMELOG) corrective regimen sliding scale   SubCutaneous three times a day before meals  insulin lispro (ADMELOG) corrective regimen sliding scale   SubCutaneous at bedtime  insulin lispro Injectable (ADMELOG) 10 Unit(s) SubCutaneous three times a day before meals  ondansetron Injectable 4 milliGRAM(s) IV Push every 8 hours PRN  potassium chloride    Tablet ER 40 milliEquivalent(s) Oral every 4 hours      32.6                       SUBJECTIVE:  No overnight events. Pt seen at bedside, states that they feel "much better, I can even lie down for a little bit." Pt endorses significant improvement of SOB, orthopnea, dyspnea on exertion, leg pain. Pt endorses continued BLE edema and tightness. Pt denies headache, fever, chills, chest pain, cough, abd pain, n/v/d, constipation, weakness, gait disturbance, numbness/tingling.      OBJECTIVE:    T(C): 37.1 (01-16-25 @ 11:05), Max: 37.1 (01-16-25 @ 11:05)  HR: 97 (01-16-25 @ 11:05) (83 - 104)  BP: 145/74 (01-16-25 @ 11:05) (102/73 - 145/74)  RR: 17 (01-16-25 @ 11:05) (17 - 18)  SpO2: 97% (01-16-25 @ 11:05) (92% - 100%)          CONSTITUTIONAL: No apparent distress, resting comfortably  EYES: Pupils symmetric, EOMI, No conjunctival or scleral injection, non-icteric  ENMT: Oral mucosa with moist membranes  RESP: No respiratory distress, no use of accessory muscles; +rhonchi auscultated in all lung fields; no wheezes  CV: RRR, +S1S2, no murmurs appreciated; 3+ nonpitting peripheral edema up to mid-thigh  GI: Soft, NTND, no rebound, no guarding  MSK: No digital clubbing or cyanosis; Extremities moving equally without additional effort; gait not appreciated  : deferred  SKIN: Lower legs and feet wrapped/dressed  NEURO: CN II-XII grossly intact   PSYCH: Appropriate insight/judgment; A+O x 3, mood and affect appropriate, recent/remote memory intact        No Known Allergies      acetaminophen     Tablet .. 650 milliGRAM(s) Oral every 6 hours PRN  dextrose 5%. 1000 milliLiter(s) (50 mL/Hr) IV Continuous <Continuous>  dextrose 5%. 1000 milliLiter(s) (100 mL/Hr) IV Continuous <Continuous>  dextrose 50% Injectable 25 Gram(s) IV Push once  dextrose 50% Injectable 12.5 Gram(s) IV Push once  dextrose 50% Injectable 25 Gram(s) IV Push once  dextrose Oral Gel 15 Gram(s) Oral once PRN  enoxaparin Injectable 80 milliGRAM(s) SubCutaneous every 12 hours  furosemide   Injectable 40 milliGRAM(s) IV Push three times a day  glucagon  Injectable 1 milliGRAM(s) IntraMuscular once  insulin glargine Injectable (LANTUS) 30 Unit(s) SubCutaneous at bedtime  insulin lispro (ADMELOG) corrective regimen sliding scale   SubCutaneous three times a day before meals  insulin lispro (ADMELOG) corrective regimen sliding scale   SubCutaneous at bedtime  insulin lispro Injectable (ADMELOG) 10 Unit(s) SubCutaneous three times a day before meals  ondansetron Injectable 4 milliGRAM(s) IV Push every 8 hours PRN  potassium chloride    Tablet ER 40 milliEquivalent(s) Oral every 4 hours                            11.3   8.15  )-----------( 312      ( 16 Jan 2025 06:05 )             32.6       01-16    135  |  100  |  21[H]  ----------------------------<  196[H]  3.2[L]   |  31  |  1.09    Ca    8.6      16 Jan 2025 06:05  Phos  3.8     01-16  Mg     1.6     01-16    TPro  7.3  /  Alb  2.7[L]  /  TBili  0.4  /  DBili  x   /  AST  17  /  ALT  18  /  AlkPhos  85  01-16      < from: TTE W or WO Ultrasound Enhancing Agent (01.16.25 @ 12:45) >  CONCLUSIONS:      1. Left ventricular systolic function is moderately decreased with an ejection fraction visually estimated at 30 to 35 %.   2. There is moderate (grade 2) left ventricular diastolic dysfunction.   3. Mild mitral regurgitation.   4. Trace tricuspid regurgitation.   5. Trace pulmonic regurgitation.   6. Trace pericardial effusion.   7. Right pleural effusion noted.   8. No prior echocardiogram is available for comparison.    < end of copied text >          Troponin I, High Sensitivity (01.16.25 @ 14:35)   Troponin I, High Sensitivity Result: 777.5  Troponin I, High Sensitivity (01.16.25 @ 06:05)   Troponin I, High Sensitivity Result: 916.5  Troponin I, High Sensitivity (01.15.25 @ 18:20)   Troponin I, High Sensitivity Result: 893.8  Troponin I, High Sensitivity (01.15.25 @ 06:43)   Troponin I, High Sensitivity Result: 882.9  Troponin I, High Sensitivity (01.15.25 @ 03:50)   Troponin I, High Sensitivity Result: 882.9

## 2025-01-16 NOTE — PROGRESS NOTE ADULT - ASSESSMENT
64F, hx of  [id number], previous admission for diabetic foot ulcer in Dec 2024 presenting with SOB, GARG, orthopnea &new fluid filled blisters on RLE.   Admitted for acute CHF exacerbation, uptrended trop to ACS r/o, COVID management and follow-up with podiatry regarding new skin changes to LE. c/w Telemetry.  Cardiology and Endo consulted.  64F, hx of  [id number], previous admission for diabetic foot ulcer in Dec 2024 presenting with SOB, GARG, orthopnea &new fluid filled blisters on RLE.   Admitted for acute CHF exacerbation, uptrended trop to ACS r/o, COVID management and follow-up with podiatry regarding new skin changes to LE. c/w Telemetry.  Cardiology and Endo consulted. TTE showed sys+diasys HF, EF 30-35%, started on GDMT, pending NST.

## 2025-01-16 NOTE — PROGRESS NOTE ADULT - PROBLEM SELECTOR PLAN 3
P/w dry cough only; no hx of fever, chills, cough, congestion  Positive for COVID, asymptomatic, RA  -Isolation Precautions  -No other interventions at this time P/w dry cough only; no hx of fever, chills, cough, congestion  Positive for COVID, asymptomatic, RA    -Isolation Precautions  -No other interventions at this time

## 2025-01-17 ENCOUNTER — TRANSCRIPTION ENCOUNTER (OUTPATIENT)
Age: 65
End: 2025-01-17

## 2025-01-17 VITALS
OXYGEN SATURATION: 94 % | RESPIRATION RATE: 19 BRPM | DIASTOLIC BLOOD PRESSURE: 56 MMHG | TEMPERATURE: 98 F | HEART RATE: 88 BPM | SYSTOLIC BLOOD PRESSURE: 110 MMHG

## 2025-01-17 LAB
ANION GAP SERPL CALC-SCNC: 8 MMOL/L — SIGNIFICANT CHANGE UP (ref 5–17)
BUN SERPL-MCNC: 22 MG/DL — HIGH (ref 7–18)
CALCIUM SERPL-MCNC: 8.4 MG/DL — SIGNIFICANT CHANGE UP (ref 8.4–10.5)
CHLORIDE SERPL-SCNC: 104 MMOL/L — SIGNIFICANT CHANGE UP (ref 96–108)
CO2 SERPL-SCNC: 30 MMOL/L — SIGNIFICANT CHANGE UP (ref 22–31)
CREAT SERPL-MCNC: 1 MG/DL — SIGNIFICANT CHANGE UP (ref 0.5–1.3)
EGFR: 63 ML/MIN/1.73M2 — SIGNIFICANT CHANGE UP
GLUCOSE BLDC GLUCOMTR-MCNC: 128 MG/DL — HIGH (ref 70–99)
GLUCOSE BLDC GLUCOMTR-MCNC: 166 MG/DL — HIGH (ref 70–99)
GLUCOSE BLDC GLUCOMTR-MCNC: 174 MG/DL — HIGH (ref 70–99)
GLUCOSE SERPL-MCNC: 203 MG/DL — HIGH (ref 70–99)
HCT VFR BLD CALC: 30.5 % — LOW (ref 34.5–45)
HGB BLD-MCNC: 10.7 G/DL — LOW (ref 11.5–15.5)
MAGNESIUM SERPL-MCNC: 1.8 MG/DL — SIGNIFICANT CHANGE UP (ref 1.6–2.6)
MCHC RBC-ENTMCNC: 25.7 PG — LOW (ref 27–34)
MCHC RBC-ENTMCNC: 35.1 G/DL — SIGNIFICANT CHANGE UP (ref 32–36)
MCV RBC AUTO: 73.1 FL — LOW (ref 80–100)
NRBC # BLD: 0 /100 WBCS — SIGNIFICANT CHANGE UP (ref 0–0)
PHOSPHATE SERPL-MCNC: 3.9 MG/DL — SIGNIFICANT CHANGE UP (ref 2.5–4.5)
PLATELET # BLD AUTO: 320 K/UL — SIGNIFICANT CHANGE UP (ref 150–400)
POTASSIUM SERPL-MCNC: 3.4 MMOL/L — LOW (ref 3.5–5.3)
POTASSIUM SERPL-SCNC: 3.4 MMOL/L — LOW (ref 3.5–5.3)
RBC # BLD: 4.17 M/UL — SIGNIFICANT CHANGE UP (ref 3.8–5.2)
RBC # FLD: 15.4 % — HIGH (ref 10.3–14.5)
SODIUM SERPL-SCNC: 142 MMOL/L — SIGNIFICANT CHANGE UP (ref 135–145)
WBC # BLD: 7.78 K/UL — SIGNIFICANT CHANGE UP (ref 3.8–10.5)
WBC # FLD AUTO: 7.78 K/UL — SIGNIFICANT CHANGE UP (ref 3.8–10.5)

## 2025-01-17 PROCEDURE — 73630 X-RAY EXAM OF FOOT: CPT

## 2025-01-17 PROCEDURE — 82803 BLOOD GASES ANY COMBINATION: CPT

## 2025-01-17 PROCEDURE — 93970 EXTREMITY STUDY: CPT

## 2025-01-17 PROCEDURE — 85025 COMPLETE CBC W/AUTO DIFF WBC: CPT

## 2025-01-17 PROCEDURE — 93005 ELECTROCARDIOGRAM TRACING: CPT

## 2025-01-17 PROCEDURE — 87637 SARSCOV2&INF A&B&RSV AMP PRB: CPT

## 2025-01-17 PROCEDURE — 80048 BASIC METABOLIC PNL TOTAL CA: CPT

## 2025-01-17 PROCEDURE — 80053 COMPREHEN METABOLIC PANEL: CPT

## 2025-01-17 PROCEDURE — 71045 X-RAY EXAM CHEST 1 VIEW: CPT

## 2025-01-17 PROCEDURE — 82550 ASSAY OF CK (CPK): CPT

## 2025-01-17 PROCEDURE — 99285 EMERGENCY DEPT VISIT HI MDM: CPT | Mod: 25

## 2025-01-17 PROCEDURE — 85610 PROTHROMBIN TIME: CPT

## 2025-01-17 PROCEDURE — 36415 COLL VENOUS BLD VENIPUNCTURE: CPT

## 2025-01-17 PROCEDURE — 85027 COMPLETE CBC AUTOMATED: CPT

## 2025-01-17 PROCEDURE — 99239 HOSP IP/OBS DSCHRG MGMT >30: CPT | Mod: GC

## 2025-01-17 PROCEDURE — 82962 GLUCOSE BLOOD TEST: CPT

## 2025-01-17 PROCEDURE — 85730 THROMBOPLASTIN TIME PARTIAL: CPT

## 2025-01-17 PROCEDURE — 93306 TTE W/DOPPLER COMPLETE: CPT

## 2025-01-17 PROCEDURE — 84484 ASSAY OF TROPONIN QUANT: CPT

## 2025-01-17 PROCEDURE — 83735 ASSAY OF MAGNESIUM: CPT

## 2025-01-17 PROCEDURE — 83880 ASSAY OF NATRIURETIC PEPTIDE: CPT

## 2025-01-17 PROCEDURE — 84100 ASSAY OF PHOSPHORUS: CPT

## 2025-01-17 RX ORDER — SACUBITRIL AND VALSARTAN 24; 26 MG/1; MG/1
1 TABLET, FILM COATED ORAL
Qty: 60 | Refills: 0
Start: 2025-01-17 | End: 2025-02-15

## 2025-01-17 RX ORDER — ENOXAPARIN SODIUM 60 MG/.6ML
40 INJECTION INTRAVENOUS; SUBCUTANEOUS EVERY 24 HOURS
Refills: 0 | Status: DISCONTINUED | OUTPATIENT
Start: 2025-01-17 | End: 2025-01-17

## 2025-01-17 RX ORDER — FUROSEMIDE 20 MG
1 TABLET ORAL
Refills: 0 | DISCHARGE

## 2025-01-17 RX ORDER — METOPROLOL TARTRATE 50 MG
1 TABLET ORAL
Qty: 30 | Refills: 0
Start: 2025-01-17 | End: 2025-02-15

## 2025-01-17 RX ORDER — FUROSEMIDE 20 MG
40 TABLET ORAL DAILY
Refills: 0 | Status: DISCONTINUED | OUTPATIENT
Start: 2025-01-17 | End: 2025-01-17

## 2025-01-17 RX ORDER — FUROSEMIDE 20 MG
40 TABLET ORAL EVERY 12 HOURS
Refills: 0 | Status: DISCONTINUED | OUTPATIENT
Start: 2025-01-17 | End: 2025-01-17

## 2025-01-17 RX ORDER — FUROSEMIDE 20 MG
1 TABLET ORAL
Qty: 30 | Refills: 0
Start: 2025-01-17 | End: 2025-02-15

## 2025-01-17 RX ORDER — POTASSIUM CHLORIDE 600 MG/1
40 TABLET, FILM COATED, EXTENDED RELEASE ORAL ONCE
Refills: 0 | Status: COMPLETED | OUTPATIENT
Start: 2025-01-17 | End: 2025-01-17

## 2025-01-17 RX ADMIN — SACUBITRIL AND VALSARTAN 1 TABLET(S): 24; 26 TABLET, FILM COATED ORAL at 06:28

## 2025-01-17 RX ADMIN — ACETAMINOPHEN 650 MILLIGRAM(S): 80 SOLUTION/ DROPS ORAL at 04:33

## 2025-01-17 RX ADMIN — Medication 40 MILLIGRAM(S): at 06:28

## 2025-01-17 RX ADMIN — Medication 10 UNIT(S): at 08:28

## 2025-01-17 RX ADMIN — ACETAMINOPHEN 650 MILLIGRAM(S): 80 SOLUTION/ DROPS ORAL at 11:30

## 2025-01-17 RX ADMIN — Medication 10 UNIT(S): at 12:25

## 2025-01-17 RX ADMIN — Medication 1: at 08:27

## 2025-01-17 RX ADMIN — ACETAMINOPHEN 650 MILLIGRAM(S): 80 SOLUTION/ DROPS ORAL at 05:02

## 2025-01-17 RX ADMIN — SACUBITRIL AND VALSARTAN 1 TABLET(S): 24; 26 TABLET, FILM COATED ORAL at 17:15

## 2025-01-17 RX ADMIN — POTASSIUM CHLORIDE 40 MILLIEQUIVALENT(S): 600 TABLET, FILM COATED, EXTENDED RELEASE ORAL at 10:39

## 2025-01-17 RX ADMIN — Medication 1: at 12:25

## 2025-01-17 RX ADMIN — ACETAMINOPHEN 650 MILLIGRAM(S): 80 SOLUTION/ DROPS ORAL at 10:39

## 2025-01-17 RX ADMIN — Medication 10 UNIT(S): at 17:15

## 2025-01-17 RX ADMIN — Medication 25 MILLIGRAM(S): at 06:28

## 2025-01-17 NOTE — DISCHARGE NOTE NURSING/CASE MANAGEMENT/SOCIAL WORK - FINANCIAL ASSISTANCE
[facility] provides services at a reduced cost to those who are determined to be eligible through [facility]’s financial assistance program. Information regarding [facility]’s financial assistance program can be found by going to [email or web address] or by calling [phone].

## 2025-01-17 NOTE — DISCHARGE NOTE PROVIDER - CARE PROVIDER_API CALL
Reyes Donohue  Internal Medicine  [address] [place] [address], [place] [address]  Phone: [phone]  Fax: [phone]  Established Patient  Follow Up Time: 2 weeks   Reyes Donohue  Internal Medicine  [address] [facility], [place] [address]  Phone: [phone]  Fax: [phone]  Established Patient  Follow Up Time: 2 weeks    Yordy Gilmore  Cardiology  [address] [place] [address]  Phone: [phone]  Fax: [phone]  Follow Up Time: 1 week

## 2025-01-17 NOTE — PROGRESS NOTE ADULT - ASSESSMENT
64F, hx of  [id number], previous admission for diabetic foot ulcer in Dec 2024 presenting with SOB, GARG, orthopnea &new fluid filled blisters on RLE.   Admitted for acute CHF exacerbation, uptrended trop to ACS r/o, COVID management and follow-up with podiatry regarding new skin changes to LE. c/w Telemetry.  Cardiology and Endo consulted. TTE showed sys+diasys HF, EF 30-35%, started on GDMT, pending NST.

## 2025-01-17 NOTE — DISCHARGE NOTE PROVIDER - NSDCMRMEDTOKEN_GEN_ALL_CORE_FT
Insulin Pump with Cequer/Simplicity (home med): Continue 5-6 clicks (10-12 units), instructions were given per endocrine dr. Bhagat.  Lasix 20 mg oral tablet: 1 tab(s) orally once a day as needed for  swelling and SOB   furosemide 40 mg oral tablet: 1 tab(s) orally once a day  Insulin Pump with Cequer/Simplicity (home med): Continue 5-6 clicks (10-12 units), instructions were given per endocrine dr. Bhagat.  metoprolol succinate 25 mg oral tablet, extended release: 1 tab(s) orally once a day  sacubitril-valsartan 24 mg-26 mg oral tablet: 1 tab(s) orally 2 times a day   acetaminophen 500 mg oral tablet: 2 tab(s) orally every 8 hours As needed Moderate Pain (4 - 6)  Admelog 100 units/mL injectable solution: injectable 4 times a day (with meals and at bedtime) Subcutaneous 3 times a day before meals AND at bedtime  1 unit if Glucose 151-200  2 unit if Glucose 201-250  3 unit if Glucose 251-300  4 unit if Glucose 301-350  5 unit if Glucose 351-400  6 units if Glucose &gt; 400 + Contact MD  amoxicillin-clavulanate 500 mg-125 mg oral tablet: 1 tab(s) orally every 12 hours  aspirin 81 mg oral tablet, chewable: 1 tab(s) orally once a day  atorvastatin 40 mg oral tablet: 1 tab(s) orally once a day (at bedtime)  DOBUTamine: now 500 mg in dextrose 5% 250 mL, infuse at 5.87 mL/Hr  Dose Rate: 2.5 Microgram/kg/Min  heparin: prn 6500 UNITS iv push, every 6 hours, PRN for aPTT less than 40  heparin: prn 3000 units, IV Push, every 6 hours, PRN for aPTT between 40-57  heparin 100 units/mL-D5% intravenous solution: intravenous now 00204 UNITS IN DEXTROSE 5% 250 ML, INFUSE AT 14 ML/HR  TARGET APTT = 58-99 SECONDS  USE FULL ANTICOAGULATION NOMOGRAM  insulin glargine 100 units/mL subcutaneous solution: 16 unit(s) subcutaneous once a day (at bedtime)  melatonin 3 mg oral tablet: 1 tab(s) orally once a day (at bedtime) As needed Insomnia  metoprolol succinate 25 mg oral tablet, extended release: 1 tab(s) orally once a day  ondansetron 2 mg/mL injectable solution: 4 milligram(s) injectable every 8 hours as needed for  nausea  oxyCODONE 5 mg oral tablet: 1 tab(s) orally every 6 hours As needed Severe Pain (7 - 10)  polyethylene glycol 3350 oral powder for reconstitution: 17 gram(s) orally once a day  senna leaf extract oral tablet: 2 tab(s) orally once a day (at bedtime)

## 2025-01-17 NOTE — DISCHARGE NOTE PROVIDER - HOSPITAL COURSE
64F, hx of  [id number], ?diastolic HF (TTE 2016: Normal LVSF EF 55-60%, Mild MR, G2DD; no meds, no cardiologist), previous admission for diabetic foot ulcer in Dec 2024 presented with SOB, GARG, orthopnea & new fluid filled blisters on RLE worsening over the past few weeks despite recently prescribed lasix 20 QD. In the ED: /63, , RR 16 94% RA, AF. EKG: NSR. COVID+. Labs: BNP 6930, Trop 830. Admitted to telemetry for acute CHF exacerbation, uptrended trop to ACS r/o, COVID management and follow-up with podiatry regarding new skin changes to LE.  On PE:  B/l crackles ausculated with 3+ pitting edema.   CXR: mod b/l pleural effusion & central CHF increased from Dec CXR.   Cards consulted, Dr. Gilmore, recommended fluid restrict 1L, TTE. TTE showed EF 30-35%, G2DD, no significant valve or wall abnormalities. Started Toprol 25, entresto 24/26 bid, lasix dec to bid.   ACS r/o:  No CP on admission. Trops trended, peaked at 917 and downtrended. Pt refused NST, cath.  For COVID+, pt p/w dry cough as only sx. No antiviral tx initiated.  Podiatry consulted for lower extremity pain/edema/blisters, recommended dressings and no surgical intervention. Xrays with no evidence of osteomyelitis.   H/o IDD2M with insulin pump, previously on Lantus 30 + Lispro 10TID. Endo consulted, Dr. Bhagat, recommended c/w previous regimen. Recent A1C >15.5.  DVT ppx with lovenox, (pt frequently refused).  Pt was advised to follow up outpatient with her PCP, endocrinologist, and was recommended f/u with Cardiology.  Patient is stable for discharge per attending and is advised to follow up with PCP as outpatient.   64F, hx of  [id number], ?diastolic HF (TTE 2016: Normal LVSF EF 55-60%, Mild MR, G2DD; no meds, no cardiologist), previous admission for diabetic foot ulcer in Dec 2024 presented with SOB, GARG, orthopnea & new fluid filled blisters on RLE worsening over the past few weeks despite recently prescribed lasix 20 QD. In the ED: /63, , RR 16 94% RA, AF. EKG: NSR. COVID+. Labs: BNP 6930, Trop 830. Admitted to telemetry for acute CHF exacerbation, uptrended trop to ACS r/o, COVID management and follow-up with podiatry regarding new skin changes to LE.  On PE:  B/l crackles ausculated with 3+ pitting edema.   CXR: mod b/l pleural effusion & central CHF increased from Dec CXR.   Cards consulted, Dr. Gilmore, recommended fluid restrict 1L, TTE. TTE showed EF 30-35%, G2DD, no significant valve or wall abnormalities. Started Toprol 25, entresto 24/26 bid, lasix dec to bid.   ACS r/o:  No CP on admission. Trops trended, peaked at 917 and downtrended. Pt refused NST, cath.  For COVID+, pt p/w dry cough as only sx. No antiviral tx initiated.  Podiatry consulted for lower extremity pain/edema/blisters, recommended dressings and no surgical intervention. Xrays with no evidence of osteomyelitis.   H/o IDD2M with insulin pump, previously on Lantus 30 + Lispro 10TID. Endo consulted, Dr. Bhagat, recommended c/w previous regimen. Recent A1C >15.5.  DVT ppx with lovenox, (pt frequently refused).  Pt was advised to follow up outpatient with her PCP, endocrinologist, and was recommended f/u with Cardiology.  Patient is stable for discharge per attending and is advised to follow up with PCP as outpatient.  Wound care instructions as per podiatry: Betadine, 4x4 gauze, abd pad, maru applied to b/l leg serous blister sites and to left hallux to be changed 3x a week.

## 2025-01-17 NOTE — DISCHARGE NOTE NURSING/CASE MANAGEMENT/SOCIAL WORK - PATIENT PORTAL LINK FT
You can access the FollowMyHealth Patient Portal offered by [facility] by registering at the following website: [email or web address]. By joining [facility]’s FollowMyHealth portal, you will also be able to view your health information using other applications (apps) compatible with our system.

## 2025-01-17 NOTE — DISCHARGE NOTE PROVIDER - NSDCCAREPROVSEEN_GEN_ALL_CORE_FT
Joanne, Shon Gilmore, Yordy Madrid, Kasi Wallace, Wilfrid Aguilera, Shine Pereira, Ivonne Ty, Mike Cabrera, Raji Bhagat, Bernice Weathers, Cyril Kat, Pako Barboza, Georgina Bennett, Shani Charles, Ariella Huff, Deborah Mckoy, Marilyn Perera, Nargis

## 2025-01-17 NOTE — PROGRESS NOTE ADULT - PROVIDER SPECIALTY LIST ADULT
Cardiology
Endocrinology
Internal Medicine
Podiatry
Internal Medicine

## 2025-01-17 NOTE — PROGRESS NOTE ADULT - PROBLEM/PLAN-3
Pt on wait list; offered 6:15 pm PT appt tonight. Left message.   
DISPLAY PLAN FREE TEXT

## 2025-01-17 NOTE — PROGRESS NOTE ADULT - SUBJECTIVE AND OBJECTIVE BOX
Interval: Patient seen sitting bedside in a chair in no acute distress. Patient had no acute events overnight. Patient states that she has been having some pain on her RLE due to her serous blisters. Patient denies any other pedal complaints. Patient denies any constitutional symptoms at this time.    Patient is a 64y old  Female who presents with a chief complaint of Cough & SOB, Leg Pain (15 Kwasi 2025 13:43)      HPI:  64F, lives at home and ambulates independently, with hx of diabetic foot ulcer and T2DM, p/w dry cough, SOB, GARG, orthopnea. Endorses symptoms started 1 day after prior discharge from [facility] in December 2024 for diabetic foot ulcer. 1 week ago, noticed her SOB was worsening and her legs were swelling up for which she went to see her PCP who gave her Lasix 20 qD PRN. Saw no improvement with the Lasix and endorses having decreased urine output. Patient has no prior cardiac history, hx of CAD, stents and does not follow a cardiologist. Denies any CP, syncope, dizziness. Patient c/o bilateral leg pain R>L with new onset of clear-filled fluid blisters on the RLE that started 3 days ago. Has not experienced any fever, redness but c/o toes in both of her feet burning.    Patient endorses not having traveled recently and has had no sick contacts. Denies associated symptoms of fever, chills, runny nose, congestion, sore throat.  (14 Jan 2025 17:26)      Podiatry HPI: Patient is a 64 year old female who podiatry was consulted on for a left toe ulcer and b/l serous blisters. Patient was previously admitted in late December 2024 where podiatry was following for the toe ulcer. Patient states that she had just developed the serous blistering in her legs over the past couple days. She states that she had received a larger dose of Lasix which was helping with the accompanying swelling in her lower extremities but her serous blisters were getting larger and draining a lot of serous fluid. Patient has no other pedal complaints. Patient admits to shortness of breath but denies any other constitutional symptoms.    Medications acetaminophen     Tablet .. 650 milliGRAM(s) Oral every 6 hours PRN  dextrose 5%. 1000 milliLiter(s) IV Continuous <Continuous>  dextrose 5%. 1000 milliLiter(s) IV Continuous <Continuous>  dextrose 50% Injectable 25 Gram(s) IV Push once  dextrose 50% Injectable 12.5 Gram(s) IV Push once  dextrose 50% Injectable 25 Gram(s) IV Push once  dextrose Oral Gel 15 Gram(s) Oral once PRN  enoxaparin Injectable 80 milliGRAM(s) SubCutaneous every 12 hours  furosemide   Injectable 40 milliGRAM(s) IV Push two times a day  glucagon  Injectable 1 milliGRAM(s) IntraMuscular once  insulin glargine Injectable (LANTUS) 30 Unit(s) SubCutaneous at bedtime  insulin lispro (ADMELOG) corrective regimen sliding scale   SubCutaneous three times a day before meals  insulin lispro (ADMELOG) corrective regimen sliding scale   SubCutaneous at bedtime  insulin lispro Injectable (ADMELOG) 10 Unit(s) SubCutaneous three times a day before meals  metoprolol succinate ER 25 milliGRAM(s) Oral daily  ondansetron Injectable 4 milliGRAM(s) IV Push every 8 hours PRN  sacubitril 24 mG/valsartan 26 mG 1 Tablet(s) Oral two times a day    FHFamily history of CHF (congestive heart failure) (Mother)    ,   PMHDM (diabetes mellitus)    HTN (hypertension)    Cataract of both eyes, unspecified cataract type    Diabetic foot ulcer       PSHNo significant past surgical history    Cataract of both eyes, unspecified cataract type    History of cholecystectomy        Labs                          10.7   7.78  )-----------( 320      ( 17 Jan 2025 06:22 )             30.5      01-17    142  |  104  |  22[H]  ----------------------------<  203[H]  3.4[L]   |  30  |  1.00    Ca    8.4      17 Jan 2025 06:22  Phos  3.9     01-17  Mg     1.8     01-17    TPro  7.3  /  Alb  2.7[L]  /  TBili  0.4  /  DBili  x   /  AST  17  /  ALT  18  /  AlkPhos  85  01-16     Vital Signs Last 24 Hrs  T(C): 36.7 (17 Jan 2025 04:21), Max: 37.1 (16 Jan 2025 11:05)  T(F): 98.1 (17 Jan 2025 04:21), Max: 98.7 (16 Jan 2025 11:05)  HR: 88 (17 Jan 2025 04:21) (88 - 100)  BP: 111/56 (17 Jan 2025 04:21) (110/62 - 145/74)  BP(mean): 71 (17 Jan 2025 04:21) (71 - 84)  RR: 18 (17 Jan 2025 04:21) (17 - 18)  SpO2: 94% (17 Jan 2025 04:21) (94% - 97%)    Parameters below as of 17 Jan 2025 04:21  Patient On (Oxygen Delivery Method): room air      Sedimentation Rate, Erythrocyte: 62 mm/Hr (12-14-24 @ 04:15)  Sedimentation Rate, Erythrocyte: 67 mm/Hr (12-12-24 @ 19:34)         C-Reactive Protein: 48.3 mg/L (12-14-24 @ 04:15)  C-Reactive Protein: 121.0 mg/L (12-12-24 @ 15:35)   WBC Count: 7.78 K/uL (01-17-25 @ 06:22)      ROS  REVIEW OF SYSTEMS:    CONSTITUTIONAL: Well groomed, no apparent distress  EYES: EOMI, No conjunctival or scleral injection, non-icteric  RESP: No respiratory distress, no use of accessory muscles; Bilateral crackles auscultated mid-lower lung  CV: RRR, +S1S2, no MRG  GI: Soft, NT, ND, no rebound, no guarding; no palpable masses; no hepatosplenomegaly; no hernia palpated  Extremities: B/l LE warm to touch with 3+ pitting edema; Clear fluid filled blisters noted on the right LE; TTP of RLE > LLE; Erythema noticed at the right ankle. RLE wrapped in bandage. B/l boots  PSYCH: Appropriate insight/judgment; A+O x 3, mood and affect appropriate, recent/remote memory intact      PHYSICAL EXAM  LE Focused:    Vasc: DP pulses palpable 1/4 bilaterally. TG within normal limits bilaterallyPedal hair present. 3+ pitting edema noted diffusely to b/l LE.   Derm: Area of necrosis noted to distal tip of left hallux. Maceration noted on the plantar surface of the left hallux with a small fibrotic ulcer on the plantarmedial aspect of the hallux. No fluctuance, no soft tissue crepitus, no malodor, no drainage, no purulence, no streaking. Large serous bullae noted on the anterior aspect of the legs b/l and dorsolateral aspect of the R foot. Lateral R serous fluid has been Cultures.  Neuro: Protective sensation diminished bilaterally  MSK: Muscle strength deferred. Negative calf tenderness bilaterally. Mild tenderness upon palpation directly to the left hallux wound.         IMAGING:  < from: MR Foot No Cont, Left (12.13.24 @ 11:42) >  ACC: [id number] EXAM:  MR FOOT LT   ORDERED BY: FARZANA SÁNCHEZ     PROCEDURE DATE:  12/13/2024          INTERPRETATION:  Exam Type: MR FOOT LEFT  Exam Date and Time: 12/13/2024 11:42 AM  Indication: Left hallux wound. Concern for osteomyelitis.  Comparison: Foot x-rays from one day prior.    TECHNIQUE:  Multiplanar multisequence MRI of the left ankle was performed.    FINDINGS:  There is a soft tissue wound about the tip of the great toe. Mild   surrounding inflammatory changes are present. There is no organized fluid   collection seen at this location. Deep to the wound, there is no low T1   signal identified within the distal phalanx of the great toe. Scattered   patchy high STIR signal is seen. There is no fracture or dislocation.   There are findings of early Charcot arthropathy at the midfoot. There is   no tenosynovitis. Fatty atrophy of the intrinsic forefoot musculature.   Dorsal foot subcutaneous tissue swelling.      IMPRESSION:  Soft tissue wound about the great toe with no low T1 signal and patchy   high STIR signal within the distal phalanx of the great toe deep to the   wound. Findings may reflect reactive osseous changes with early   osteomyelitis also a consideration within the differential diagnosis.    Early Charcot arthropathy at the midfoot.    --- End of Report ---            ZOHRA MADERA MD; Attending Radiologist  This document has been electronically signed. Dec 13 2024 12:06PM    < end of copied text >      CULTURES: N/A

## 2025-01-17 NOTE — PROGRESS NOTE ADULT - PROBLEM SELECTOR PLAN 1
P/w SOB, GARG, Orthopnea for approx 1 month with no improvement on Lasix 20 qD PRN  B/l crackles ausculated with 3+ pitting edema  BNP 6930  EKG: NSR  CXR: mod b/l pleural effusion & central CHF increased from Dec CXR  TTE 2016: Normal LVSF EF 55-60%, Mild MR, G2DD  TTE this admission:  EF 30-35%, G2DD  Cards consulted, Dr. Gilmore  -Admit to tele  -Strict I/Os  -Daily weights  -Fluid restrict 1L  -IV lasix 40 BID  -GDMT: started metoprolol xl 25 QD, entresto low dose

## 2025-01-17 NOTE — DISCHARGE NOTE NURSING/CASE MANAGEMENT/SOCIAL WORK - NSDCFUADDAPPT_GEN_ALL_CORE_FT
APPTS ARE READY TO BE MADE: [X] YES    Best Family or Patient Contact (if needed):  [phone]    Additional Information about above appointments (if needed):    1: Cardiology Dr. Gilmore  2:   3:     Other comments or requests:

## 2025-01-17 NOTE — PROGRESS NOTE ADULT - SUBJECTIVE AND OBJECTIVE BOX
Interval Events:      Allergies    No Known Allergies    Intolerances      Endocrine/Metabolic Medications:  dextrose 50% Injectable 25 Gram(s) IV Push once  dextrose 50% Injectable 12.5 Gram(s) IV Push once  dextrose 50% Injectable 25 Gram(s) IV Push once  dextrose Oral Gel 15 Gram(s) Oral once PRN  glucagon  Injectable 1 milliGRAM(s) IntraMuscular once  insulin glargine Injectable (LANTUS) 30 Unit(s) SubCutaneous at bedtime  insulin lispro (ADMELOG) corrective regimen sliding scale   SubCutaneous three times a day before meals  insulin lispro (ADMELOG) corrective regimen sliding scale   SubCutaneous at bedtime  insulin lispro Injectable (ADMELOG) 10 Unit(s) SubCutaneous three times a day before meals      Vital Signs Last 24 Hrs  T(C): 36.7 (17 Jan 2025 04:21), Max: 37 (16 Jan 2025 14:48)  T(F): 98.1 (17 Jan 2025 04:21), Max: 98.6 (16 Jan 2025 14:48)  HR: 88 (17 Jan 2025 04:21) (88 - 100)  BP: 111/56 (17 Jan 2025 04:21) (110/62 - 132/66)  BP(mean): 71 (17 Jan 2025 04:21) (71 - 84)  RR: 18 (17 Jan 2025 04:21) (18 - 18)  SpO2: 94% (17 Jan 2025 04:21) (94% - 95%)    Parameters below as of 17 Jan 2025 04:21  Patient On (Oxygen Delivery Method): room air          PHYSICAL EXAM  All physical exam findings normal, except those marked:  General:	Alert, active, cooperative, NAD, well hydrated  .		[] Abnormal:  Neck		Normal: supple, no cervical adenopathy, no palpable thyroid  .		[] Abnormal:  Cardiovascular	Normal: regular rate, normal S1, S2, no murmurs  .		[] Abnormal:  Respiratory	Normal: no chest wall deformity, normal respiratory pattern, CTA B/L  .		[] Abnormal:  Abdominal	Normal: soft, ND, NT, bowel sounds present, no masses, no organomegaly  .		[] Abnormal:  		Normal normal genitalia, testes descended, circumcised/uncircumcised  .		Michael stage:			Breast michael:  .		Menstrual history:  .		[] Abnormal:  Extremities	Normal: FROM x4  .		[] Abnormal:  Skin		Normal: intact and not indurated, no rash, no acanthosis nigricans  .		[] Abnormal:  Neurologic	Normal: grossly intact  .		[] Abnormal:    LABS                        10.7   7.78  )-----------( 320      ( 17 Jan 2025 06:22 )             30.5                               142    |  104    |  22                  Calcium: 8.4   / iCa: x      (01-17 @ 06:22)    ----------------------------<  203       Magnesium: 1.8                              3.4     |  30     |  1.00             Phosphorous: 3.9        CAPILLARY BLOOD GLUCOSE      POCT Blood Glucose.: 166 mg/dL (17 Jan 2025 12:13)  POCT Blood Glucose.: 174 mg/dL (17 Jan 2025 07:45)  POCT Blood Glucose.: 181 mg/dL (16 Jan 2025 20:59)  POCT Blood Glucose.: 107 mg/dL (16 Jan 2025 16:57)        Assesment/plan       Interval Events:  pt in nad    Allergies    No Known Allergies    Intolerances      Endocrine/Metabolic Medications:  dextrose 50% Injectable 25 Gram(s) IV Push once  dextrose 50% Injectable 12.5 Gram(s) IV Push once  dextrose 50% Injectable 25 Gram(s) IV Push once  dextrose Oral Gel 15 Gram(s) Oral once PRN  glucagon  Injectable 1 milliGRAM(s) IntraMuscular once  insulin glargine Injectable (LANTUS) 30 Unit(s) SubCutaneous at bedtime  insulin lispro (ADMELOG) corrective regimen sliding scale   SubCutaneous three times a day before meals  insulin lispro (ADMELOG) corrective regimen sliding scale   SubCutaneous at bedtime  insulin lispro Injectable (ADMELOG) 10 Unit(s) SubCutaneous three times a day before meals      Vital Signs Last 24 Hrs  T(C): 36.7 (17 Jan 2025 04:21), Max: 37 (16 Jan 2025 14:48)  T(F): 98.1 (17 Jan 2025 04:21), Max: 98.6 (16 Jan 2025 14:48)  HR: 88 (17 Jan 2025 04:21) (88 - 100)  BP: 111/56 (17 Jan 2025 04:21) (110/62 - 132/66)  BP(mean): 71 (17 Jan 2025 04:21) (71 - 84)  RR: 18 (17 Jan 2025 04:21) (18 - 18)  SpO2: 94% (17 Jan 2025 04:21) (94% - 95%)    Parameters below as of 17 Jan 2025 04:21  Patient On (Oxygen Delivery Method): room air          PHYSICAL EXAM  All physical exam findings normal, except those marked:  General:	Alert, active, cooperative, NAD, well hydrated  .		[] Abnormal:  Neck		Normal: supple, no cervical adenopathy, no palpable thyroid  .		[] Abnormal:  Cardiovascular	Normal: regular rate, normal S1, S2, no murmurs  .		[] Abnormal:  Respiratory	Normal: no chest wall deformity, normal respiratory pattern, CTA B/L  .		[] Abnormal:  Abdominal	Normal: soft, ND, NT, bowel sounds present, no masses, no organomegaly  .		[] Abnormal:  		Normal normal genitalia, testes descended, circumcised/uncircumcised  .		Michael stage:			Breast michael:  .		Menstrual history:  .		[] Abnormal:  Extremities	Normal: FROM x4  .		[] Abnormal:  Skin		Normal: intact and not indurated, no rash, no acanthosis nigricans  .		[] Abnormal:  Neurologic	Normal: grossly intact  .		[] Abnormal:    LABS                        10.7   7.78  )-----------( 320      ( 17 Jan 2025 06:22 )             30.5                               142    |  104    |  22                  Calcium: 8.4   / iCa: x      (01-17 @ 06:22)    ----------------------------<  203       Magnesium: 1.8                              3.4     |  30     |  1.00             Phosphorous: 3.9        CAPILLARY BLOOD GLUCOSE      POCT Blood Glucose.: 166 mg/dL (17 Jan 2025 12:13)  POCT Blood Glucose.: 174 mg/dL (17 Jan 2025 07:45)  POCT Blood Glucose.: 181 mg/dL (16 Jan 2025 20:59)  POCT Blood Glucose.: 107 mg/dL (16 Jan 2025 16:57)        Assesment/plan    · Assessment	  64F, lives at home and ambulates independently, with hx of diabetic foot ulcer and T2DM, p/w dry cough, SOB, GARG, orthopnea.   Endocrine consulted for dm management. Pt known to me from prev admission- noncompliant wit meds and diet. States she is compliant lately- treseba 30 and premeal via Cequer 10-14 . Denies hypos         Problem/Recommendation - 1:  ·  Problem: T2DM (type 2 diabetes mellitus).    hyperglycemia improved  cont lantus 30 units   d/c Cequer while in patient- restart as out pt  cont admelog 10 units ac tid  fsg ac and hs  repeat a1c  compliance d/w pt.     Problem/Recommendation - 2:  ·  Problem: Acute CHF.   tx per cardio.

## 2025-01-17 NOTE — DISCHARGE NOTE PROVIDER - ATTENDING DISCHARGE PHYSICAL EXAMINATION:
Patient was seen and examined at bedside. Denies any SOB. Leg swelling has improved. Refuses any further work up. "I am already stressed". Reports will followup with Dr. Gilmore and will complete work up    ICU Vital Signs Last 24 Hrs  T(C): 36.6 (17 Jan 2025 17:15), Max: 37 (16 Jan 2025 21:25)  T(F): 97.9 (17 Jan 2025 17:15), Max: 98.6 (16 Jan 2025 21:25)  HR: 88 (17 Jan 2025 17:15) (88 - 95)  BP: 131/66 (17 Jan 2025 17:15) (110/62 - 131/66)  BP(mean): 85 (17 Jan 2025 17:15) (71 - 85)  ABP: --  ABP(mean): --  RR: 18 (17 Jan 2025 17:15) (18 - 18)  SpO2: 95% (17 Jan 2025 17:15) (94% - 95%)    O2 Parameters below as of 17 Jan 2025 17:15  Patient On (Oxygen Delivery Method): room air    P/E:  NAD  AAOx3, no focal deficit   CTABL  S1S2 WNL  Abd soft, non tender, BS present   1+ edema on BLLE, blister floor dry, no s/s of infection     A/P;  Patient refuses any further work up. Wants to be discharged home and will complete work up as outpatient. Insulin pump continued on discharge

## 2025-01-17 NOTE — DISCHARGE NOTE PROVIDER - PROVIDER TOKENS
PROVIDER:[TOKEN:[[id number]],FOLLOWUP:[2 weeks],ESTABLISHEDPATIENT:[T]] PROVIDER:[TOKEN:[[id number]],FOLLOWUP:[2 weeks],ESTABLISHEDPATIENT:[T]],PROVIDER:[TOKEN:[[id number]],FOLLOWUP:[1 week]]

## 2025-01-17 NOTE — PROGRESS NOTE ADULT - ASSESSMENT
A:  Left hallux ulcer  B/l serous bullae 2/2 acute CHF  Left Charcot arthropathy    P:   Patient evaluated and Chart reviewed   Discussed diagnosis and treatment with patient  Venous duplex results reviewed; no DVT.  Previous MRI results reviewed.  X-ray results reviewed; no cortical erosions of left hallux distal phalanx.   Applied betadine DSD to serous bullae and L hallux ulcer.  Weight bearing as tolerated to b/l LE  Discussed importance of daily foot examinations and proper shoe gear and to importance of lower Fasting Blood Glucose levels.   Podiatry to follow while in house. Charcot reconstruction for L foot was discussed last admission but shelved due to uncontrolled DM (A1c >15.5). Consider discussing again when patient has achieved better glycemic control. No surgical intervention at this time.  Discussed with Attending Dr. Palma   A:  Left hallux ulcer  B/l serous bullae 2/2 acute CHF  Left Charcot arthropathy    P:   Patient evaluated and Chart reviewed   Discussed diagnosis and treatment with patient  Venous duplex results reviewed; no DVT.  Previous MRI results reviewed.  X-ray results reviewed; no cortical erosions of left hallux distal phalanx.   Applied betadine DSD to serous bullae and L hallux ulcer.  Weight bearing as tolerated to b/l LE  Discussed importance of daily foot examinations and proper shoe gear and to importance of lower Fasting Blood Glucose levels.   Podiatry to follow while in house. Charcot reconstruction for L foot was discussed last admission but shelved due to uncontrolled DM (A1c >15.5). Consider discussing again when patient has achieved better glycemic control. No surgical intervention at this time.  Discussed with Attending Dr. Palma    WCO: Betadine, 4x4 gauze, abd pad, maru applied to b/l leg serous blister sites and to left hallux to be changed 3x a week.

## 2025-01-17 NOTE — DISCHARGE NOTE PROVIDER - NSDCFUSCHEDAPPT_GEN_ALL_CORE_FT
[facility]  [address]STIMAG [address] [facility]  Scheduled Appointment: 02/07/2025    [facility]  ULTRASND [address] [facility]  Scheduled Appointment: 02/07/2025

## 2025-01-17 NOTE — DISCHARGE NOTE NURSING/CASE MANAGEMENT/SOCIAL WORK - NSPROMEDSRETURNEDYESNO_GEN_A_NUR
Per patient no medication was brought to hospital. No documentation of medication stored in pharmacy in patient's chart

## 2025-01-17 NOTE — PROGRESS NOTE ADULT - PROBLEM SELECTOR PLAN 4
Previously seen at [facility] for diabetic left foot ulcer and discharged with oral Augmentin x 10d  P/w b/l LE pain greater on the Right than left; fluid filled blisters on the right leg  Duplex negative for DVT  -Podiatry consulted, follow up reccs.
Previously seen at [facility] for diabetic left foot ulcer and discharged with oral Augmentin x 10d  P/w b/l LE pain greater on the Right than left; fluid filled blisters on the right leg  Duplex negative for DVT  -Podiatry consulted, follow up reccs.
Previously seen at [facility] for diabetic left foot ulcer and discharged with oral Augmentin x 10d  P/w b/l LE pain greater on the Right than left; fluid filled blisters on the right leg  Duplex negative for DVT  -Podiatry consulted, follow up reccs.

## 2025-01-17 NOTE — PROGRESS NOTE ADULT - REASON FOR ADMISSION
Cough & SOB, Leg Pain

## 2025-01-17 NOTE — DISCHARGE NOTE PROVIDER - NSDCFUADDAPPT_GEN_ALL_CORE_FT
APPTS ARE READY TO BE MADE: [X] YES    Best Family or Patient Contact (if needed):  [phone]    Additional Information about above appointments (if needed):    1: Cardiology Dr. Gilmore  2:   3:     Other comments or requests:   APPTS ARE READY TO BE MADE: [X] YES    Best Family or Patient Contact (if needed):  [phone]    Additional Information about above appointments (if needed):    1: Cardiology Dr. Gilmore  2: PCP  3: Endocrine     Other comments or requests:   APPTS ARE READY TO BE MADE: [X] YES    Best Family or Patient Contact (if needed):  [phone]    Additional Information about above appointments (if needed):    1: Cardiology Dr. Gilmore  2: PCP  3: Endocrine     Other comments or requests:    Patient was outreached but did not answer. A voicemail was left for the patient to return our call.
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
## 2025-01-17 NOTE — DISCHARGE NOTE PROVIDER - NSDCCPCAREPLAN_GEN_ALL_CORE_FT
PRINCIPAL DISCHARGE DIAGNOSIS  Diagnosis: Acute CHF  Assessment and Plan of Treatment: You came to the hospital with swelling and pain of your legs, shortness of breath, and difficulty laying down flat (orthopnea). You were seen by Cardiology and their recommendations were followed. An echocardiogram of your heart revealed that you have systolic and diastolic heart failure. Your Ejection Fraction is 30-35% and you have Grade 2 Diastolic Dysfunction (unchanged from echocardiogram in 2016). Your fluid overload was treated with IV lasix and you were started on Guideline Directed Medical Therapy for your heart failure. Please START TAKING:  Entresto 24/26mg one tablet TWICE PER DAY  Toprol XL 25mg one tablet ONCE PER DAY  -  Please follow up with a Cardiologist within 1-2 weeks of discharge for further management.      SECONDARY DISCHARGE DIAGNOSES  Diagnosis: Asymptomatic COVID-19 virus infection  Assessment and Plan of Treatment: You tested positive for COVID19 infection but did not have significant symptoms. You were not treated with antiviral therapy because you did not have sypmtoms. You were given supportive care.    Diagnosis: Elevated troponin level  Assessment and Plan of Treatment: Your cardiac enzymes called troponins were found to be elevated in your blood. These enzymes increase when your heart becomes damaged or stressed. Your troponin levels were monitored until they peaked and started to decrease. You were offered a Stress Test inpatient and/or transfer to Cardiac Cath but you stated you would prefer to do these procedures/tests outpatient instead.    Diagnosis: Insulin dependent type 2 diabetes mellitus  Assessment and Plan of Treatment: You have a history of diabetes. Your blood sugar was managed with Lantus, Admelog, and sliding scale insulin as needed.  You need to continue monitoring your blood sugar levels closely and maintain healthy lifestyle by eating healthy diabetic regimen, weight loss and exercise regularly as tolerated.  Please continue to take your insulin as prescribed.   Please follow up with your PCP/Endocrinologist within a week of discharge.    Diagnosis: Bilateral lower extremity pain  Assessment and Plan of Treatment: You came to the hospital with swelling and pain of your legs. Doppler ultrasound of your leg veins showed no evidence of blood clots. You were seen by wound care and podiatry who dressed your blisters and managed your lower leg wounds. You were treated with IV Lasix medication to decrease the amount of fluid in your body to decrease the fluid in your legs. You were discharged with a prescription for Lasix. Please START TAKING:  Lasix 40mg one tablet ONCE PER DAY  -  Please follow up with a Cardiologist within 1-2 weeks of discharge for further management.     PRINCIPAL DISCHARGE DIAGNOSIS  Diagnosis: Acute CHF  Assessment and Plan of Treatment: You came to the hospital with swelling and pain of your legs, shortness of breath, and difficulty laying down flat (orthopnea). You were seen by Cardiology and their recommendations were followed. An echocardiogram of your heart revealed that you have systolic and diastolic heart failure. Your Ejection Fraction is 30-35% and you have Grade 2 Diastolic Dysfunction (unchanged from echocardiogram in 2016). Your fluid overload was treated with IV lasix and you were started on Guideline Directed Medical Therapy for your heart failure. Please START TAKING:  Entresto 24/26mg one tablet TWICE PER DAY  Toprol XL 25mg one tablet ONCE PER DAY  -  Please follow up with a Cardiologist within 1-2 weeks of discharge for further management.      SECONDARY DISCHARGE DIAGNOSES  Diagnosis: Asymptomatic COVID-19 virus infection  Assessment and Plan of Treatment: You tested positive for COVID19 infection but did not have significant symptoms. You were not treated with antiviral therapy because you did not have sypmtoms. You were given supportive care.    Diagnosis: Elevated troponin level  Assessment and Plan of Treatment: Your cardiac enzymes called troponins were found to be elevated in your blood. These enzymes increase when your heart becomes damaged or stressed. Your troponin levels were monitored until they peaked and started to decrease. You were offered a Stress Test inpatient and/or transfer to Cardiac Cath but you stated you would prefer to do these procedures/tests outpatient instead.    Diagnosis: Insulin dependent type 2 diabetes mellitus  Assessment and Plan of Treatment: You have a history of diabetes. Your blood sugar was managed with Lantus, Admelog, and sliding scale insulin as needed.  You need to continue monitoring your blood sugar levels closely and maintain healthy lifestyle by eating healthy diabetic regimen, weight loss and exercise regularly as tolerated.  Please continue to take your insulin as prescribed.   Please follow up with your PCP/Endocrinologist within a week of discharge.    Diagnosis: Bilateral lower extremity pain  Assessment and Plan of Treatment: You came to the hospital with swelling and pain of your legs. Doppler ultrasound of your leg veins showed no evidence of blood clots. You were seen by wound care and podiatry who dressed your blisters and managed your lower leg wounds. You were treated with IV Lasix medication to decrease the amount of fluid in your body to decrease the fluid in your legs. You were discharged with a prescription for Lasix. Please START TAKING:  Lasix 40mg one tablet ONCE PER DAY  -Wound care instructions as per podiatry: Betadine, 4x4 gauze, abd pad, maru applied to b/l leg serous blister sites and to left hallux to be changed 3x a week.  Please follow up with a Cardiologist within 1-2 weeks of discharge for further management.

## 2025-01-17 NOTE — PROGRESS NOTE ADULT - PROBLEM SELECTOR PLAN 5
Hx of T2DM with insulin pump, using 6-8u TID; Does not take Tresiba 30u  Previous A1c in December >15.5  Endo consulted, Dr. Bhagat    -Will restart previous insulin regimen of Lantus 30 + ISS & Lispro 10TID + ISS  - per endo, c/w above regimen

## 2025-01-17 NOTE — PROGRESS NOTE ADULT - NUTRITIONAL ASSESSMENT
Diet, Regular:   Consistent Carbohydrate {No Snacks}  DASH/TLC {Sodium & Cholesterol Restricted}  1000mL Fluid Restriction ([id number])  Halal  No Beef  No Caffeine  No Pork (01-15-25 @ 10:06) [Active]
Diet, Regular:   Consistent Carbohydrate {No Snacks}  DASH/TLC {Sodium & Cholesterol Restricted}  1000mL Fluid Restriction ([id number])  Halal  No Beef  No Caffeine  No Pork (01-15-25 @ 10:06) [Active]

## 2025-01-17 NOTE — PROGRESS NOTE ADULT - SUBJECTIVE AND OBJECTIVE BOX
SUBJECTIVE: **TO UPDATE**  No overnight events. Pt seen at bedside, states that they feel "__________." Pt endorses _____. Pt denies headache, fever, chills, SOB, chest pain, cough, abd pain, n/v/d, constipation, dysuria, urinary frequency, back pain, myalgias, weakness, dizziness, gait disturbance, numbness/tingling, blurry vision.      OBJECTIVE:    T(C): 36.7 (01-17-25 @ 04:21), Max: 37.1 (01-16-25 @ 11:05)  HR: 88 (01-17-25 @ 04:21) (88 - 100)  BP: 111/56 (01-17-25 @ 04:21) (110/62 - 145/74)  RR: 18 (01-17-25 @ 04:21) (17 - 18)  SpO2: 94% (01-17-25 @ 04:21) (94% - 97%)        01-16-25 @ 07:01  -  01-17-25 @ 07:00  --------------------------------------------------------  IN: 900 mL / OUT: 0 mL / NET: 900 mL          ***TO BE EDITED***  CONSTITUTIONAL: No apparent distress, resting comfortably  EYES: Pupils symmetric, EOMI, No conjunctival or scleral injection, non-icteric  ENMT: Oral mucosa with moist membranes  RESP: No respiratory distress, no use of accessory muscles; CTA b/l, no crackles or wheezes  CV: RRR, +S1S2, no murmurs appreciated; no peripheral edema  GI: Soft, NTND, no rebound, no guarding  MSK: No digital clubbing or cyanosis; Extremities moving equally without additional effort; gait not appreciated  : deferred  SKIN: No rashes or ulcers noted  NEURO: CN II-XII grossly intact   PSYCH: Appropriate insight/judgment; A+O x 3, mood and affect appropriate, recent/remote memory intact    No Known Allergies      acetaminophen     Tablet .. 650 milliGRAM(s) Oral every 6 hours PRN  dextrose 5%. 1000 milliLiter(s) (50 mL/Hr) IV Continuous <Continuous>  dextrose 5%. 1000 milliLiter(s) (100 mL/Hr) IV Continuous <Continuous>  dextrose 50% Injectable 25 Gram(s) IV Push once  dextrose 50% Injectable 12.5 Gram(s) IV Push once  dextrose 50% Injectable 25 Gram(s) IV Push once  dextrose Oral Gel 15 Gram(s) Oral once PRN  enoxaparin Injectable 80 milliGRAM(s) SubCutaneous every 12 hours  furosemide    Tablet 40 milliGRAM(s) Oral daily  glucagon  Injectable 1 milliGRAM(s) IntraMuscular once  insulin glargine Injectable (LANTUS) 30 Unit(s) SubCutaneous at bedtime  insulin lispro (ADMELOG) corrective regimen sliding scale   SubCutaneous three times a day before meals  insulin lispro (ADMELOG) corrective regimen sliding scale   SubCutaneous at bedtime  insulin lispro Injectable (ADMELOG) 10 Unit(s) SubCutaneous three times a day before meals  metoprolol succinate ER 25 milliGRAM(s) Oral daily  ondansetron Injectable 4 milliGRAM(s) IV Push every 8 hours PRN  potassium chloride    Tablet ER 40 milliEquivalent(s) Oral once  sacubitril 24 mG/valsartan 26 mG 1 Tablet(s) Oral two times a day                            10.7   7.78  )-----------( 320      ( 17 Jan 2025 06:22 )             30.5     01-17    142  |  104  |  22[H]  ----------------------------<  203[H]  3.4[L]   |  30  |  1.00    Ca    8.4      17 Jan 2025 06:22  Phos  3.9     01-17  Mg     1.8     01-17    TPro  7.3  /  Alb  2.7[L]  /  TBili  0.4  /  DBili  x   /  AST  17  /  ALT  18  /  AlkPhos  85  01-16      < from: TTE W or WO Ultrasound Enhancing Agent (01.16.25 @ 12:45) >  CONCLUSIONS:      1. Left ventricular systolic function is moderately decreased with an ejection fraction visually estimated at 30 to 35 %.   2. There is moderate (grade 2) left ventricular diastolic dysfunction.   3. Mild mitral regurgitation.   4. Trace tricuspid regurgitation.   5. Trace pulmonic regurgitation.   6. Trace pericardial effusion.   7. Right pleural effusion noted.   8. No prior echocardiogram is available for comparison.    < end of copied text >    < from: 12 Lead ECG (01.14.25 @ 15:31) >  Diagnosis Line *** Poor data quality, interpretation may be adversely affected  Normal sinus rhythm  Possible Anterior infarct , age undetermined  ST & T wave abnormality, consider lateral ischemia  Abnormal ECG    Confirmed by ASHWIN COOK, [facility] ([id number]) on 1/16/2025 8:01:26 AM    < end of copied text >      Troponin I, High Sensitivity (01.16.25 @ 14:35)   Troponin I, High Sensitivity Result: 777.5    Troponin I, High Sensitivity (01.16.25 @ 06:05)   Troponin I, High Sensitivity Result: 916.5

## 2025-01-23 ENCOUNTER — INPATIENT (INPATIENT)
Facility: HOSPITAL | Age: 65
LOS: 5 days | Discharge: ANOTHER HOSPITAL | DRG: 871 | End: 2025-01-29
Attending: HOSPITALIST | Admitting: HOSPITALIST
Payer: COMMERCIAL

## 2025-01-23 VITALS
HEIGHT: 65 IN | DIASTOLIC BLOOD PRESSURE: 61 MMHG | RESPIRATION RATE: 18 BRPM | SYSTOLIC BLOOD PRESSURE: 114 MMHG | OXYGEN SATURATION: 97 % | HEART RATE: 118 BPM | WEIGHT: 172.62 LBS | TEMPERATURE: 99 F

## 2025-01-23 DIAGNOSIS — H26.9 UNSPECIFIED CATARACT: Chronic | ICD-10-CM

## 2025-01-23 DIAGNOSIS — A41.9 SEPSIS, UNSPECIFIED ORGANISM: ICD-10-CM

## 2025-01-23 DIAGNOSIS — R50.9 FEVER, UNSPECIFIED: ICD-10-CM

## 2025-01-23 DIAGNOSIS — I25.10 ATHEROSCLEROTIC HEART DISEASE OF NATIVE CORONARY ARTERY WITHOUT ANGINA PECTORIS: ICD-10-CM

## 2025-01-23 DIAGNOSIS — E11.9 TYPE 2 DIABETES MELLITUS WITHOUT COMPLICATIONS: ICD-10-CM

## 2025-01-23 DIAGNOSIS — R65.10 SYSTEMIC INFLAMMATORY RESPONSE SYNDROME (SIRS) OF NON-INFECTIOUS ORIGIN WITHOUT ACUTE ORGAN DYSFUNCTION: ICD-10-CM

## 2025-01-23 DIAGNOSIS — Z29.9 ENCOUNTER FOR PROPHYLACTIC MEASURES, UNSPECIFIED: ICD-10-CM

## 2025-01-23 DIAGNOSIS — I50.9 HEART FAILURE, UNSPECIFIED: ICD-10-CM

## 2025-01-23 DIAGNOSIS — Z90.49 ACQUIRED ABSENCE OF OTHER SPECIFIED PARTS OF DIGESTIVE TRACT: Chronic | ICD-10-CM

## 2025-01-23 DIAGNOSIS — Z01.818 ENCOUNTER FOR OTHER PREPROCEDURAL EXAMINATION: ICD-10-CM

## 2025-01-23 PROBLEM — E11.621 TYPE 2 DIABETES MELLITUS WITH FOOT ULCER: Chronic | Status: ACTIVE | Noted: 2025-01-14

## 2025-01-23 LAB
ALBUMIN SERPL ELPH-MCNC: 3.4 G/DL — LOW (ref 3.5–5)
ALP SERPL-CCNC: 78 U/L — SIGNIFICANT CHANGE UP (ref 40–120)
ALT FLD-CCNC: 21 U/L DA — SIGNIFICANT CHANGE UP (ref 10–60)
ANION GAP SERPL CALC-SCNC: 8 MMOL/L — SIGNIFICANT CHANGE UP (ref 5–17)
APPEARANCE UR: ABNORMAL
APTT BLD: 31.1 SEC — SIGNIFICANT CHANGE UP (ref 24.5–35.6)
AST SERPL-CCNC: 18 U/L — SIGNIFICANT CHANGE UP (ref 10–40)
BASOPHILS # BLD AUTO: 0.08 K/UL — SIGNIFICANT CHANGE UP (ref 0–0.2)
BASOPHILS NFR BLD AUTO: 0.7 % — SIGNIFICANT CHANGE UP (ref 0–2)
BILIRUB SERPL-MCNC: 0.9 MG/DL — SIGNIFICANT CHANGE UP (ref 0.2–1.2)
BILIRUB UR-MCNC: ABNORMAL
BUN SERPL-MCNC: 27 MG/DL — HIGH (ref 7–18)
CALCIUM SERPL-MCNC: 9.2 MG/DL — SIGNIFICANT CHANGE UP (ref 8.4–10.5)
CHLORIDE SERPL-SCNC: 104 MMOL/L — SIGNIFICANT CHANGE UP (ref 96–108)
CO2 SERPL-SCNC: 29 MMOL/L — SIGNIFICANT CHANGE UP (ref 22–31)
COLOR SPEC: SIGNIFICANT CHANGE UP
CREAT SERPL-MCNC: 1.11 MG/DL — SIGNIFICANT CHANGE UP (ref 0.5–1.3)
CRP SERPL-MCNC: 69.8 MG/L — HIGH (ref 0–5)
DIFF PNL FLD: NEGATIVE — SIGNIFICANT CHANGE UP
EGFR: 56 ML/MIN/1.73M2 — LOW
EOSINOPHIL # BLD AUTO: 0.13 K/UL — SIGNIFICANT CHANGE UP (ref 0–0.5)
EOSINOPHIL NFR BLD AUTO: 1.1 % — SIGNIFICANT CHANGE UP (ref 0–6)
ERYTHROCYTE [SEDIMENTATION RATE] IN BLOOD: 82 MM/HR — HIGH (ref 0–20)
FLUAV AG NPH QL: SIGNIFICANT CHANGE UP
FLUBV AG NPH QL: SIGNIFICANT CHANGE UP
GLUCOSE BLDC GLUCOMTR-MCNC: 128 MG/DL — HIGH (ref 70–99)
GLUCOSE BLDC GLUCOMTR-MCNC: 157 MG/DL — HIGH (ref 70–99)
GLUCOSE SERPL-MCNC: 69 MG/DL — LOW (ref 70–99)
GLUCOSE UR QL: NEGATIVE MG/DL — SIGNIFICANT CHANGE UP
HCT VFR BLD CALC: 32.9 % — LOW (ref 34.5–45)
HGB BLD-MCNC: 11.1 G/DL — LOW (ref 11.5–15.5)
IMM GRANULOCYTES NFR BLD AUTO: 0.3 % — SIGNIFICANT CHANGE UP (ref 0–0.9)
INR BLD: 1.16 RATIO — SIGNIFICANT CHANGE UP (ref 0.85–1.16)
KETONES UR-MCNC: ABNORMAL MG/DL
LACTATE SERPL-SCNC: 2.1 MMOL/L — HIGH (ref 0.7–2)
LEUKOCYTE ESTERASE UR-ACNC: ABNORMAL
LYMPHOCYTES # BLD AUTO: 1.94 K/UL — SIGNIFICANT CHANGE UP (ref 1–3.3)
LYMPHOCYTES # BLD AUTO: 16.9 % — SIGNIFICANT CHANGE UP (ref 13–44)
MCHC RBC-ENTMCNC: 25.2 PG — LOW (ref 27–34)
MCHC RBC-ENTMCNC: 33.7 G/DL — SIGNIFICANT CHANGE UP (ref 32–36)
MCV RBC AUTO: 74.8 FL — LOW (ref 80–100)
MONOCYTES # BLD AUTO: 0.71 K/UL — SIGNIFICANT CHANGE UP (ref 0–0.9)
MONOCYTES NFR BLD AUTO: 6.2 % — SIGNIFICANT CHANGE UP (ref 2–14)
NEUTROPHILS # BLD AUTO: 8.61 K/UL — HIGH (ref 1.8–7.4)
NEUTROPHILS NFR BLD AUTO: 74.8 % — SIGNIFICANT CHANGE UP (ref 43–77)
NITRITE UR-MCNC: NEGATIVE — SIGNIFICANT CHANGE UP
NRBC # BLD: 0 /100 WBCS — SIGNIFICANT CHANGE UP (ref 0–0)
NRBC BLD-RTO: 0 /100 WBCS — SIGNIFICANT CHANGE UP (ref 0–0)
NT-PROBNP SERPL-SCNC: 6964 PG/ML — HIGH (ref 0–125)
PH UR: 5.5 — SIGNIFICANT CHANGE UP (ref 5–8)
PLATELET # BLD AUTO: 348 K/UL — SIGNIFICANT CHANGE UP (ref 150–400)
POTASSIUM SERPL-MCNC: 4.3 MMOL/L — SIGNIFICANT CHANGE UP (ref 3.5–5.3)
POTASSIUM SERPL-SCNC: 4.3 MMOL/L — SIGNIFICANT CHANGE UP (ref 3.5–5.3)
PROT SERPL-MCNC: 8.7 G/DL — HIGH (ref 6–8.3)
PROT UR-MCNC: 30 MG/DL
PROTHROM AB SERPL-ACNC: 13.4 SEC — SIGNIFICANT CHANGE UP (ref 9.9–13.4)
RBC # BLD: 4.4 M/UL — SIGNIFICANT CHANGE UP (ref 3.8–5.2)
RBC # FLD: 15.3 % — HIGH (ref 10.3–14.5)
RSV RNA NPH QL NAA+NON-PROBE: SIGNIFICANT CHANGE UP
SARS-COV-2 RNA SPEC QL NAA+PROBE: SIGNIFICANT CHANGE UP
SODIUM SERPL-SCNC: 141 MMOL/L — SIGNIFICANT CHANGE UP (ref 135–145)
SP GR SPEC: 1.02 — SIGNIFICANT CHANGE UP (ref 1–1.03)
UROBILINOGEN FLD QL: 1 MG/DL — SIGNIFICANT CHANGE UP (ref 0.2–1)
WBC # BLD: 11.51 K/UL — HIGH (ref 3.8–10.5)
WBC # FLD AUTO: 11.51 K/UL — HIGH (ref 3.8–10.5)

## 2025-01-23 PROCEDURE — 73630 X-RAY EXAM OF FOOT: CPT | Mod: 26,RT

## 2025-01-23 PROCEDURE — 73590 X-RAY EXAM OF LOWER LEG: CPT | Mod: 26,RT

## 2025-01-23 PROCEDURE — 71046 X-RAY EXAM CHEST 2 VIEWS: CPT | Mod: 26

## 2025-01-23 PROCEDURE — 99223 1ST HOSP IP/OBS HIGH 75: CPT | Mod: GC

## 2025-01-23 PROCEDURE — 99285 EMERGENCY DEPT VISIT HI MDM: CPT

## 2025-01-23 RX ORDER — ACETAMINOPHEN 160 MG/5ML
650 SUSPENSION ORAL EVERY 6 HOURS
Refills: 0 | Status: DISCONTINUED | OUTPATIENT
Start: 2025-01-23 | End: 2025-01-25

## 2025-01-23 RX ORDER — SACUBITRIL AND VALSARTAN 49; 51 MG/1; MG/1
1 TABLET, FILM COATED ORAL
Refills: 0 | Status: DISCONTINUED | OUTPATIENT
Start: 2025-01-23 | End: 2025-01-27

## 2025-01-23 RX ORDER — INSULIN LISPRO 100/ML
VIAL (ML) SUBCUTANEOUS AT BEDTIME
Refills: 0 | Status: DISCONTINUED | OUTPATIENT
Start: 2025-01-23 | End: 2025-01-29

## 2025-01-23 RX ORDER — VANCOMYCIN HYDROCHLORIDE 50 MG/ML
1000 KIT ORAL ONCE
Refills: 0 | Status: COMPLETED | OUTPATIENT
Start: 2025-01-23 | End: 2025-01-23

## 2025-01-23 RX ORDER — METOPROLOL SUCCINATE 25 MG
25 TABLET, EXTENDED RELEASE 24 HR ORAL DAILY
Refills: 0 | Status: DISCONTINUED | OUTPATIENT
Start: 2025-01-23 | End: 2025-01-29

## 2025-01-23 RX ORDER — CEFEPIME HCL 1 G
1000 IV SOLUTION, PIGGYBACK, BOTTLE (EA) INTRAVENOUS ONCE
Refills: 0 | Status: COMPLETED | OUTPATIENT
Start: 2025-01-23 | End: 2025-01-23

## 2025-01-23 RX ORDER — INSULIN LISPRO 100/ML
10 VIAL (ML) SUBCUTANEOUS
Refills: 0 | Status: DISCONTINUED | OUTPATIENT
Start: 2025-01-23 | End: 2025-01-25

## 2025-01-23 RX ORDER — ENOXAPARIN SODIUM 100 MG/ML
40 INJECTION SUBCUTANEOUS EVERY 24 HOURS
Refills: 0 | Status: DISCONTINUED | OUTPATIENT
Start: 2025-01-23 | End: 2025-01-27

## 2025-01-23 RX ORDER — ATORVASTATIN CALCIUM 80 MG/1
40 TABLET, FILM COATED ORAL AT BEDTIME
Refills: 0 | Status: DISCONTINUED | OUTPATIENT
Start: 2025-01-23 | End: 2025-01-29

## 2025-01-23 RX ORDER — ONDANSETRON 4 MG/1
4 TABLET, ORALLY DISINTEGRATING ORAL EVERY 8 HOURS
Refills: 0 | Status: DISCONTINUED | OUTPATIENT
Start: 2025-01-23 | End: 2025-01-29

## 2025-01-23 RX ORDER — ASPIRIN 81 MG/1
81 TABLET, COATED ORAL DAILY
Refills: 0 | Status: DISCONTINUED | OUTPATIENT
Start: 2025-01-23 | End: 2025-01-29

## 2025-01-23 RX ORDER — GABAPENTIN 800 MG/1
100 TABLET ORAL ONCE
Refills: 0 | Status: COMPLETED | OUTPATIENT
Start: 2025-01-23 | End: 2025-01-23

## 2025-01-23 RX ORDER — BUMETANIDE 2 MG/1
2 TABLET ORAL
Refills: 0 | Status: DISCONTINUED | OUTPATIENT
Start: 2025-01-23 | End: 2025-01-23

## 2025-01-23 RX ORDER — INSULIN GLARGINE-YFGN 100 [IU]/ML
30 INJECTION, SOLUTION SUBCUTANEOUS AT BEDTIME
Refills: 0 | Status: DISCONTINUED | OUTPATIENT
Start: 2025-01-23 | End: 2025-01-25

## 2025-01-23 RX ORDER — ACETAMINOPHEN 160 MG/5ML
650 SUSPENSION ORAL ONCE
Refills: 0 | Status: COMPLETED | OUTPATIENT
Start: 2025-01-23 | End: 2025-01-23

## 2025-01-23 RX ORDER — INSULIN LISPRO 100/ML
VIAL (ML) SUBCUTANEOUS
Refills: 0 | Status: DISCONTINUED | OUTPATIENT
Start: 2025-01-23 | End: 2025-01-29

## 2025-01-23 RX ORDER — BUMETANIDE 2 MG/1
1 TABLET ORAL
Refills: 0 | Status: DISCONTINUED | OUTPATIENT
Start: 2025-01-23 | End: 2025-01-25

## 2025-01-23 RX ORDER — ACETAMINOPHEN, DIPHENHYDRAMINE HCL, PHENYLEPHRINE HCL 325; 25; 5 MG/1; MG/1; MG/1
3 TABLET ORAL AT BEDTIME
Refills: 0 | Status: DISCONTINUED | OUTPATIENT
Start: 2025-01-23 | End: 2025-01-29

## 2025-01-23 RX ADMIN — Medication 40 MILLIGRAM(S): at 15:39

## 2025-01-23 RX ADMIN — GABAPENTIN 100 MILLIGRAM(S): 800 TABLET ORAL at 23:47

## 2025-01-23 RX ADMIN — SACUBITRIL AND VALSARTAN 1 TABLET(S): 49; 51 TABLET, FILM COATED ORAL at 19:49

## 2025-01-23 RX ADMIN — ATORVASTATIN CALCIUM 40 MILLIGRAM(S): 80 TABLET, FILM COATED ORAL at 22:00

## 2025-01-23 RX ADMIN — VANCOMYCIN HYDROCHLORIDE 250 MILLIGRAM(S): KIT at 16:42

## 2025-01-23 RX ADMIN — Medication 100 MILLIGRAM(S): at 15:39

## 2025-01-23 RX ADMIN — INSULIN GLARGINE-YFGN 30 UNIT(S): 100 INJECTION, SOLUTION SUBCUTANEOUS at 22:00

## 2025-01-23 NOTE — H&P ADULT - PROBLEM SELECTOR PLAN 2
Hx of CHF, previously admission in January   Cardio consulted, Dr. Gilmore      -Admit to tele + q4 VS  -IV lasix 40 BID  -f/u Cardio recs Hx of CHF, previously admission in January, on home Lasix 40 qD, Metoprolol Succ 25 qD. Not on Entresto due to insurance issues  Cardio consulted, Dr. Gilmore      -Admit to tele + q4 VS  -Strict I/Os  -Daily Weights  -IV Bumex 1 BID  -c/w Metoprolol Succ 25 qD  -f/u Cardio recs Hx of CHF, previously admission in January, on home Lasix 40 qD, Metoprolol Succ 25 qD. Not on Entresto due to insurance issues  Cardio consulted, Dr. Gilmore      -Admit to tele + q4 VS  -Strict I/Os  -Fluid Restrict 1.5 L  -Daily Weights  -IV Bumex 1 BID  -c/w Metoprolol Succ 25 qD  -Restarted entresto 24/26 BID  -f/u Cardio recs Hx of CHF, previous admission in January, on home Lasix 40 qD, Metoprolol Succ 25 qD. Not on Entresto due to insurance issues  Last TTE: LVSF moderately decreased w/ EF 30-35 %. Moderate G2DD. Mild MR  Cardio consulted, Dr. Gilmore    -Admit to tele + q4 VS  -Strict I/Os  -Fluid Restrict 1.5 L  -Daily Weights  -IV Bumex 1 BID  -c/w Metoprolol Succ 25 qD  -Restarted entresto 24/26 BID  -f/u Cardio recs

## 2025-01-23 NOTE — CONSULT NOTE ADULT - ASSESSMENT
A:   B/l LE serous bullae secondary to fluid overload  R hallux dry gangrene      P:   Patient evaluated and Chart reviewed   Discussed diagnosis and treatment with patient  Explained to patient that pain is likely from fluid overload and exposed dermis from lanced serous bullae.  Applied xeroform with dry sterile dressing  X-rays evaluated, shows no erosions or gas, results as noted above  Weight bearing as tolerated to RLE.   Discussed importance of daily foot examinations and proper shoe gear and to importance of lower Fasting Blood Glucose levels.   Patient is stable from podiatry standpoint.   Patient to follow up with podiatry outpatient at [address] [place] [address].  Discussed with Attending Dr. Palma    Outpatient WCO: xeroform, 4x4 gauze, abd pad, maru, and ACE to be applied to b/l LE 3 times a week or as necessary if dressings are saturated.   A:   B/l LE serous bullae secondary to fluid overload  R hallux dry gangrene      P:   Patient evaluated and Chart reviewed   Discussed diagnosis and treatment with patient  Explained to patient that pain is likely from fluid overload and exposed dermis from lanced serous bullae.  Applied xeroform with dry sterile dressing.  WCO placed  X-rays evaluated, shows no erosions or gas, results as noted above  Weight bearing as tolerated to RLE.   Discussed importance of daily foot examinations and proper shoe gear and to importance of lower Fasting Blood Glucose levels.   Patient is stable from podiatry standpoint.   Patient to follow up with podiatry outpatient at [address] [place] [address].  Discussed with Attending Dr. Palma    WCO: xeroform, 4x4 gauze, abd pad, maru, and ACE to be applied to b/l LE 3 times a week or as necessary if dressings are saturated.

## 2025-01-23 NOTE — ED PROVIDER NOTE - CLINICAL SUMMARY MEDICAL DECISION MAKING FREE TEXT BOX
patient presenting with leg wound otherwise noting fever on arrival will obtain lab assess for sepsis podiatry consult ED observation and reassess

## 2025-01-23 NOTE — ED ADULT NURSE NOTE - COVID-19 RESULT DATE/TIME
6/22/2020        Halle King               To Whom It May Concern,    This is to certify that Halle was seen in the clinic on 6/22/2020. Please excuse her from work 6/22/20-6/24/20. If you have any questions feel free to contact our office at [phone].          SIGNATURE:_________________________________________, 6/22/2020     Krissy Fritz MD                            [facility]   Obstetrics and Gynecology  [address] [place] [address] [phone]    
14-Jan-2025 14:41

## 2025-01-23 NOTE — H&P ADULT - ASSESSMENT
64F, hx of CHF (last TTE on 1/16/25 EF 30-35%, G2DD), DM, diabetic foot ulcer with a recent admission at [facility] for CHF exacerbation 1/14-1/17 p/w worsening R. leg pain and fluid secretion and admitted for R. leg cellulitis. 64F, hx of CHF (last TTE on 1/16/25 EF 30-35%, G2DD), DM, diabetic foot ulcer with a recent admission at [facility] for CHF exacerbation 1/14-1/17 p/w worsening R. leg pain and fluid secretion, found to be meeting sepsis criteria 64F, hx of CHF (last TTE on 1/16/25 EF 30-35%, G2DD), DM, diabetic foot ulcer with a recent admission at [facility] for CHF exacerbation 1/14-1/17 p/w worsening R. leg pain and fluid secretion, was meeting sepsis criteria and admitted for continued management of CHF exacerbation 64F, hx of CHF (last TTE on 1/16/25 EF 30-35%, G2DD), DM, diabetic foot ulcer with a recent admission at [facility] for CHF exacerbation 1/14-1/17 p/w worsening R. leg pain and fluid secretion, was meeting sepsis criteria with podiatry having low suspicion for right cellulitis and admitted for continued management of CHF exacerbation

## 2025-01-23 NOTE — H&P ADULT - PROBLEM SELECTOR PLAN 1
Meeting sepsis criteria   Lactate 2.1 Meeting sepsis criteria   Lactate 2.1    -No abx indicated at this time as per attending Meeting sepsis criteria   Lactate 2.1  s/p Cefepime + Vanc    As per podiatry, no indication of cellulitis at this time   Podiatry    -No abx indicated at this time as per attending due to no signs/symptoms of any infection  -Weight bearing as tolerated to RLE.  -f/u BClx  -f/u UClx Had fever 101.5 on admission  patient denying any recent history of fever  UA negative  COVID/viral panel negative  Abdomen without pain, soft nontender. UA unremarkable  discussed with podiatry lower extremity and reviewed photos- lower extremity appears improved compared to prior  Xray with bilateral effusions- check CT chest, recent admission  Continue monitoring wbc and fevers- start broad spectrum and wbc   No source of infection thus far, hold antibiotics Had fever 101.5 on admission  patient denying any recent history of fever  UA negative  COVID/viral panel negative  Abdomen without pain, soft nontender  discussed with podiatry lower extremity and reviewed photos- lower extremity appears improved compared to prior  Xray with bilateral effusions- check CT chest, recent admission  Continue monitoring wbc and fevers- if recurs, start abroad spectrum  No source of infection thus far, hold antibiotics Had fever 101.5 on admission  patient denying any recent history of fever  UA negative  COVID/viral panel negative  Abdomen without pain, soft nontender  discussed with podiatry lower extremity and reviewed photos- lower extremity appears improved compared to prior  Xray with bilateral effusions- check CT chest, recent admission    -Continue monitoring wbc and fevers- if recurs, start abroad spectrum  -No source of infection thus far, hold antibiotics  -f/u CT chest

## 2025-01-23 NOTE — ED PROVIDER NOTE - ENMT, MLM
History of biopsy  chest wall skin lesion 10/18  History of tonsillectomy Airway patent, Nasal mucosa clear. Mouth with normal mucosa. Throat has no vesicles, no oropharyngeal exudates and uvula is midline.

## 2025-01-23 NOTE — ED PROVIDER NOTE - PHYSICAL EXAMINATION
superficial wound with blister on right foot and lower tibia without surrounding erythema or fluctuant   noting edema and bilateral

## 2025-01-23 NOTE — CONSULT NOTE ADULT - ASSESSMENT
64F, hx of CHF (last TTE on 1/16/25 EF 30-35%, G2DD), DM, diabetic foot ulcer with a recent admission at [facility] for CHF exacerbation 1/14-1/17 p/w worsening R. leg pain and fluid secretion, was meeting sepsis criteria and admitted for continued management of CHF exacerbation      # SIRS (systemic inflammatory response syndrome).   # HF   # CAD  abx per Primary team   Lasix 40 qD, Metoprolol Succ 25 qD. Not on Entresto due to insurance issues  -c/w Metoprolol Succ 25 qD  -Resume Entresto 24/26 BID  -aspirin 81 qD  -Atorvastatin 40 qD

## 2025-01-23 NOTE — ED PROVIDER NOTE - OBJECTIVE STATEMENT
64-year-old presenting with right leg wound was seen recently and admitted for fluid overload hide wound evaluated by podiatry while patient was admitted endorses wound did not heal despite debridement by podiatry endorses noting weeping from wound in her right leg denies any fever nausea vomiting

## 2025-01-23 NOTE — CONSULT NOTE ADULT - SUBJECTIVE AND OBJECTIVE BOX
Patient is a 64y old  Female who presents with a chief complaint of right leg pain.    HPI: 64-year-old presenting with right leg wound was seen recently and admitted for fluid overload hide wound evaluated by podiatry while patient was admitted endorses wound did not heal despite debridement by podiatry endorses noting weeping from wound in her right leg denies any fever nausea vomiting      Podiatry HPI: Patient is a 64 year old female who podiatry was consulted on for right leg pain. Patient was admitted earlier in the month of January for CHF exacerbation with serous blistering of the b/l LE. Patient states that this has not stopped since she was discharged from the hospital and that the pain has remained constant. Patient complains of severe drainage from her RLE from the lanced blisters. Patient originally had an appointment to see podiatry outpatient but could not bear the pain and the amount of drainage from the wound. Patient denies any other pedal complaints. Patient denies any constitutional symptoms.    PMH:DM (diabetes mellitus)    HTN (hypertension)    Cataract of both eyes, unspecified cataract type    Diabetic foot ulcer      Allergies: No Known Allergies    Medications: cefepime   IVPB 1000 milliGRAM(s) IV Intermittent once  furosemide   Injectable 40 milliGRAM(s) IV Push Once  vancomycin  IVPB. 1000 milliGRAM(s) IV Intermittent once    FH:Family history of CHF (congestive heart failure) (Mother)      PSX: No significant past surgical history    Cataract of both eyes, unspecified cataract type    History of cholecystectomy      SH: cefepime   IVPB 1000 milliGRAM(s) IV Intermittent once  furosemide   Injectable 40 milliGRAM(s) IV Push Once  vancomycin  IVPB. 1000 milliGRAM(s) IV Intermittent once      Vital Signs Last 24 Hrs  T(C): 37.8 (23 Jan 2025 13:45), Max: 38.6 (23 Jan 2025 10:23)  T(F): 100.1 (23 Jan 2025 13:45), Max: 101.5 (23 Jan 2025 10:23)  HR: 116 (23 Jan 2025 13:45) (116 - 125)  BP: 105/61 (23 Jan 2025 13:45) (105/61 - 114/61)  BP(mean): --  RR: 16 (23 Jan 2025 13:45) (16 - 18)  SpO2: 94% (23 Jan 2025 13:45) (94% - 97%)    Parameters below as of 23 Jan 2025 13:45  Patient On (Oxygen Delivery Method): room air        LABS                        11.1   11.51 )-----------( 348      ( 23 Jan 2025 10:36 )             32.9               01-23    141  |  104  |  27[H]  ----------------------------<  69[L]  4.3   |  29  |  1.11    Ca    9.2      23 Jan 2025 10:36    TPro  8.7[H]  /  Alb  3.4[L]  /  TBili  0.9  /  DBili  x   /  AST  18  /  ALT  21  /  AlkPhos  78  01-23      ROS  REVIEW OF SYSTEMS:    · Physical Examination: superficial wound with blister on right foot and lower tibia without surrounding erythema or fluctuant   noting edema and bilateral  · CONSTITUTIONAL: Well appearing, awake, alert, oriented to person, place, time/situation and in no apparent distress.  · ENMT: Airway patent, Nasal mucosa clear. Mouth with normal mucosa. Throat has no vesicles, no oropharyngeal exudates and uvula is midline.  · EYES: Clear bilaterally, pupils equal, round and reactive to light.  · CARDIAC: Normal rate, regular rhythm.  Heart sounds S1, S2.  No murmurs, rubs or gallops.  · RESPIRATORY: Breath sounds clear and equal bilaterally.  · GASTROINTESTINAL: Abdomen soft, non-tender, no guarding.        PHYSICAL EXAM  LE Focused:    Vasc: DP pulses palpable 1/4 bilaterally. TG within normal limits bilaterallyPedal hair present. 3+ pitting edema noted diffusely to b/l LE.   Derm: No erythema noted. Area of necrosis noted to distal tip of left hallux. No fluctuance, no soft tissue crepitus, no malodor, no drainage, no purulence, no streaking. Large remnants of serous bullae lanced on previous admission noted on the anterior aspect of the legs b/l and dorsolateral aspect of the R foot. New serous bullae on the distal aspect of the dorsolateral R foot blister.  Neuro: Protective sensation diminished bilaterally  MSK: Muscle strength deferred. Negative calf tenderness bilaterally. Mild tenderness upon palpation directly to the left hallux wound.         IMAGING:  < from: Xray Foot AP + Lateral + Oblique, Left (01.16.25 @ 13:40) >    ACC: [id number] EXAM:  XR FOOT COMP MIN 3 VIEWS LT   ORDERED BY: TRACEE ALMANZAR     PROCEDURE DATE:  01/16/2025          INTERPRETATION:  LEFT foot    CLINICAL INFORMATION: LEFT hallux foot wound..  Comparison: The LEFT foot radiographs 12/12/2024  TECHNIQUE: AP,lateral and oblique views.    FINDINGS: Distal first hallux soft tissue ulceration without evidence of   osteolysis/osteomyelitis. No fracture.  Remaining phalanges and metatarsal bones radiographic intact.    Advanced osteoarthrosis of the midfoot resulting in pes planus concerning   for Charcot joint.  Calcaneus intact. Narrowed subtalar joint space..  No subcutaneous air or radiopaque foreign body.    IMPRESSION:    Soft tissue swelling/ulceration of first phalanx without radiographic   evidence of osteolysis/osteomyelitis.  If osteomyelitis is clinically considered  despite conservative therapy   and soft tissue / bone infection requires further assessment, follow-up   MRI recommended.    --- End of Report ---            KALIN LAWSON MD; Attending Radiologist  This document has been electronically signed. Jan 17 2025 11:07AM    < end of copied text >        CULTURES: N/A

## 2025-01-23 NOTE — H&P ADULT - NSHPPHYSICALEXAM_GEN_ALL_CORE
T(C): 36.8 (01-23-25 @ 16:06), Max: 38.6 (01-23-25 @ 10:23)  HR: 108 (01-23-25 @ 16:06) (108 - 125)  BP: 120/86 (01-23-25 @ 19:50) (105/61 - 122/77)  RR: 18 (01-23-25 @ 16:06) (16 - 18)  SpO2: 96% (01-23-25 @ 16:06) (94% - 97%)    CONSTITUTIONAL: Well groomed, distress only when pressure applied to right leg  EYES: EOMI, No conjunctival or scleral injection, non-icteric  RESP: No respiratory distress, no use of accessory muscles; bilateral crackles  CV: Tachycardic, regular rhythm, +S1S2, no MRG; 4+ pitting edema of right leg, 3+ pitting edema of left leg  GI: Soft, NT, ND, no rebound, no guarding; no palpable masses; no hepatosplenomegaly; no hernia palpated  SKIN: Both legs wrapped in bandages, right leg noted to be swollen more compared to left and extremely tender to soft palpation, right toes discolored with skin changes  PSYCH: Appropriate insight/judgment; AOx3, mood and affect appropriate, recent/remote memory intact

## 2025-01-23 NOTE — H&P ADULT - TIME BILLING
Review of previous admission, discussion with consultants, review of current admission labs, previous admission labs, review of previous consultant notes. Formulation of plan, medical and medication management, documentation of encounter

## 2025-01-23 NOTE — H&P ADULT - NSICDXPASTMEDICALHX_GEN_ALL_CORE_FT
PAST MEDICAL HISTORY:  CAD (coronary artery disease)     Congestive heart failure (CHF)     Diabetic foot ulcer     DM (diabetes mellitus)

## 2025-01-23 NOTE — H&P ADULT - PROBLEM SELECTOR PLAN 3
Hx of DM on home Tresiba 30u, Cequer insulin pump  Endo consulted, Dr. Bhagat    -f/u Endo recs Hx of DM on home Tresiba 30u, Cequer insulin pump  Endo consulted, Dr. Bhagat    -f/u Endo recs  -Restarted on previous admission insulin regimen:  Lantus 30u bedtime + low ISS  Lispro 10u TID + low ISS Hx of DM on home Tresiba 30u, Cequer insulin pump  Endo consulted, Dr. Bhagat    -Restarted on previous admission insulin regimen:  Lantus 30u bedtime + low ISS  Lispro 10u TID + low ISS  -f/u yM recs Hx of DM on home Tresiba 30u, Cequer insulin pump  Endo consulted, Dr. Bhagat    -Restarted on previous admission insulin regimen:  Lantus 30u bedtime + low ISS  Lispro 10u TID + low ISS  -f/u A1c  -f/u Endo recs

## 2025-01-23 NOTE — CONSULT NOTE ADULT - SUBJECTIVE AND OBJECTIVE BOX
,Patient is a 64y old  Female who presents with a chief complaint of R. Leg pain and swelling (2025 16:21)      HPI:      PAST MEDICAL & SURGICAL HISTORY:  DM (diabetes mellitus)      Diabetic foot ulcer      Cataract of both eyes, unspecified cataract type      History of cholecystectomy          PREVIOUS DIAGNOSTIC TESTING:      ECHO  FINDINGS:    STRESS  FINDINGS:    CATHETERIZATION  FINDINGS:    MEDICATIONS  (STANDING):  aspirin  chewable 81 milliGRAM(s) Oral daily  atorvastatin 40 milliGRAM(s) Oral at bedtime  buMETAnide Injectable 1 milliGRAM(s) IV Push two times a day  enoxaparin Injectable 40 milliGRAM(s) SubCutaneous every 24 hours  insulin glargine Injectable (LANTUS) 30 Unit(s) SubCutaneous at bedtime  insulin lispro (ADMELOG) corrective regimen sliding scale   SubCutaneous three times a day before meals  insulin lispro (ADMELOG) corrective regimen sliding scale   SubCutaneous at bedtime  insulin lispro Injectable (ADMELOG) 10 Unit(s) SubCutaneous three times a day before meals  metoprolol succinate ER 25 milliGRAM(s) Oral daily  sacubitril 24 mG/valsartan 26 mG 1 Tablet(s) Oral two times a day    MEDICATIONS  (PRN):  acetaminophen     Tablet .. 650 milliGRAM(s) Oral every 6 hours PRN Temp greater or equal to 38C (100.4F), Mild Pain (1 - 3)  melatonin 3 milliGRAM(s) Oral at bedtime PRN Insomnia  ondansetron Injectable 4 milliGRAM(s) IV Push every 8 hours PRN Nausea and/or Vomiting      FAMILY HISTORY:  Family history of CHF (congestive heart failure) (Mother)        SOCIAL HISTORY:    CIGARETTES:    ALCOHOL:    REVIEW OF SYSTEMS:  CONSTITUTIONAL: No fever, weight loss, or fatigue  EYES: No eye pain, visual disturbances, or discharge  ENMT:  No difficulty hearing, tinnitus, vertigo; No sinus or throat pain  NECK: No pain or stiffness  RESPIRATORY: No cough, wheezing, chills or hemoptysis; No shortness of breath  CARDIOVASCULAR: No chest pain, palpitations, dizziness, or leg swelling  GASTROINTESTINAL: No abdominal or epigastric pain. No nausea, vomiting, or hematemesis; No diarrhea or constipation. No melena or hematochezia.  GENITOURINARY: No dysuria, frequency, hematuria, or incontinence  NEUROLOGICAL: No headaches, memory loss, loss of strength, numbness, or tremors  SKIN: No itching, burning, rashes, or lesions   LYMPH NODES: No enlarged glands  ENDOCRINE: No heat or cold intolerance; No hair loss  MUSCULOSKELETAL: No joint pain or swelling; No muscle, back, or extremity pain  PSYCHIATRIC: No depression, anxiety, mood swings, or difficulty sleeping  HEME/LYMPH: No easy bruising, or bleeding gums  ALLERY AND IMMUNOLOGIC: No hives or eczema    Vital Signs Last 24 Hrs  T(C): 36.8 (2025 16:06), Max: 38.6 (2025 10:23)  T(F): 98.3 (2025 16:06), Max: 101.5 (2025 10:23)  HR: 108 (2025 16:06) (108 - 125)  BP: 120/86 (2025 19:50) (105/61 - 122/77)  BP(mean): --  RR: 18 (2025 16:06) (16 - 18)  SpO2: 96% (2025 16:06) (94% - 97%)    Parameters below as of 2025 16:06  Patient On (Oxygen Delivery Method): room air          PHYSICAL EXAM:  GENERAL: NAD, well-groomed, well-developed  HEAD:  Atraumatic, Normocephalic  EYES: EOMI, PERRLA, conjunctiva and sclera clear  ENMT: No tonsillar erythema, exudates, or enlargement; Moist mucous membranes, Good dentition, No lesions  NECK: Supple, No JVD, Normal thyroid  NERVOUS SYSTEM:  Alert & Oriented X3, Good concentration; Motor Strength 5/5 B/L upper and lower extremities; DTRs 2+ intact and symmetric  CHEST/LUNG: Clear to percussion bilaterally; No rales, rhonchi, wheezing, or rubs  HEART: Regular rate and rhythm; No murmurs, rubs, or gallops  ABDOMEN: Soft, Nontender, Nondistended; Bowel sounds present  EXTREMITIES:  leg swelling  LYMPH: No lymphadenopathy noted  SKIN: No rashes or lesions      INTERPRETATION OF TELEMETRY:    [facility]    [facility]     LABS:                        11.1   11.51 )-----------( 348      ( 2025 10:36 )             32.9         141  |  104  |  27[H]  ----------------------------<  69[L]  4.3   |  29  |  1.11    Ca    9.2      2025 10:36    TPro  8.7[H]  /  Alb  3.4[L]  /  TBili  0.9  /  DBili  x   /  AST  18  /  ALT  21  /  AlkPhos  78          PT/INR - ( 2025 10:36 )   PT: 13.4 sec;   INR: 1.16 ratio         PTT - ( 2025 10:36 )  PTT:31.1 sec  Urinalysis Basic - ( 2025 14:12 )    Color: Dark Yellow / Appearance: Cloudy / S.022 / pH: x  Gluc: x / Ketone: Trace mg/dL  / Bili: Small / Urobili: 1.0 mg/dL   Blood: x / Protein: 30 mg/dL / Nitrite: Negative   Leuk Esterase: Small / RBC: 0 /HPF / WBC 4 /HPF   Sq Epi: x / Non Sq Epi: x / Bacteria: Few /HPF      Lipid Panel:   I&O's Summary      RADIOLOGY & ADDITIONAL STUDIES:    < from: TTE W or WO Ultrasound Enhancing Agent (25 @ 12:45) >  CONCLUSIONS:      1. Left ventricular systolic function is moderately decreased with an ejection fraction visually estimated at 30 to 35 %.   2. There is moderate (grade 2) left ventricular diastolic dysfunction.   3. Mild mitral regurgitation.   4. Trace tricuspid regurgitation.   5. Trace pulmonic regurgitation.   6. Trace pericardial effusion.   7. Right pleural effusion noted.   8. No prior echocardiogram is available for comparison.    < end of copied text >

## 2025-01-23 NOTE — H&P ADULT - NSHPREVIEWOFSYSTEMS_GEN_ALL_CORE
REVIEW OF SYSTEMS:    CONSTITUTIONAL: No weakness, fevers or chills  EYES/ENT: No visual changes;  No vertigo or throat pain   NECK: No pain or stiffness  RESPIRATORY: +GARG; No cough, wheezing, hemoptysis; No shortness of breath at rest  CARDIOVASCULAR: + orthopnea; No chest pain or palpitations  GASTROINTESTINAL: No abdominal or epigastric pain. No nausea, vomiting, or hematemesis; No diarrhea or constipation. No melena or hematochezia.  GENITOURINARY: No dysuria, frequency or hematuria  NEUROLOGICAL: No numbness or weakness  SKIN: Increased swelling of right leg, pain of right leg  All other review of systems is negative unless indicated above.

## 2025-01-23 NOTE — H&P ADULT - PROBLEM SELECTOR PLAN 4
DVT ppx: Lovenox Hx of CAD s/p stents    -Started aspirin 81 qD  -Started Atorvastatin 40 qD  -Stress Test in AM, no caffeine in AM Hx of CAD s/p stents    -Started aspirin 81 qD  -Started Atorvastatin 40 qD  ultimately needs cardiac cath but patient previous admission declined Hx of CAD s/p stents    -Started aspirin 81 qD  -Started Atorvastatin 40 qD  -ultimately needs cardiac cath but patient previous admission declined Hx of CAD s/p stents    -Started aspirin 81 qD  -Started Atorvastatin 40 qD  -f/u Lipid Panel  -ultimately needs cardiac cath but patient previous admission declined

## 2025-01-23 NOTE — ED ADULT NURSE NOTE - COVID-19 ORDERING FACILITY
Patient contacted (Name and  of pt verified). All results and recommendations reviewed. Patient verbalizes understanding, denies further questions and agrees with plan of care. Provided number for central schedule to schedule ordered testing.  Booked fol [facility]

## 2025-01-23 NOTE — H&P ADULT - ATTENDING COMMENTS
64 female with uncontrolled DM, diabetic foot ulcers, chfrEF who presented with foot wound due to fluid overload.  Patient was discharged within a week ago after being diuresed with IV lasix and went home with furosemide 40mg po qdaily.  She notes not marked urine output on po furosemide.   SHe was also in process of trying to get entresto but her outpatient physician was in process.  Due to uncontrolled swelling she presented back to hospital.  Patient reported a fever but transient, she reports no cough, no chills, no abdominal pain, no dysuria.    PE:No distress, speaking complete sentences without pause. her lung sounds diminished more so on the right, she has bilateral legs wrapped, her toes are shiny and edematous.    Assessment and Plan:   Patient is a 63 yo woman with PMH significant for CHFrEF DM2 c/b diabetic foot ulcer who presents with shortness of breath, cough, orthopnea admitted for continued heart failure exacerbation.     #Acute HFrEF exacerbation  #Foot ulcer  #DM2  #Transient Fever      Change furosemide to IV bumex 1mg bid, check i+os, daily weights  - prior to discharge ideally should be monitored on maintenance PO medications before DC  New decreased EF? followup cardiology- likely outpatient cardiac eval,   - Hx of CAD, new decreased EF should at minimum be on aspirin, start high intensity statin in setting of CAD AND DM  Check Lipid panel check TSH. Will discuss with cardiology regaridn inpt vs outpt cardiac eval  -endocrine consult for DM management  -monitor tele while undergoing aggressive diuresis  - further chart review, patient declined stress test and cath  -podiatry consulted, her wounds are due to fluid overload  Patient noted with one episode of fever checked rectally. She does not display any signs and symptoms of any infection. Will DEFER antibiotics for now, continue watching fever, trend wbc, follow blood cultres, may ask ID to followup 64 female with uncontrolled DM, diabetic foot ulcers, chfrEF who presented with foot wound due to fluid overload.  Patient was discharged within a week ago after being diuresed with IV lasix and went home with furosemide 40mg po qdaily.  She notes not marked urine output on po furosemide.   SHe was also in process of trying to get entresto but her outpatient physician was in process.  Due to uncontrolled swelling she presented back to hospital.  Patient reported a fever but transient, she reports no cough, no chills, no abdominal pain, no dysuria.    PE:No distress, speaking complete sentences without pause. her lung sounds diminished more so on the right, she has bilateral legs wrapped, her toes are shiny and edematous.    Assessment and Plan:   Patient is a 65 yo woman with PMH significant for CHFrEF DM2 c/b diabetic foot ulcer who presents with shortness of breath, cough, orthopnea admitted for continued heart failure exacerbation.     #Acute HFrEF exacerbation  #Diabetic Foot ulcer  #DM2  #Transient Fever      Change furosemide to IV bumex 1mg bid, check i+os, daily weights  - prior to discharge ideally should be monitored on maintenance PO medications before DC  New decreased EF- patient previously declined cath and NST  - Hx of CAD, new decreased EF should at minimum be on aspirin, start high intensity statin in setting of CAD AND DM  Check Lipid panel check TSH. Will discuss with cardiology regarding inpt vs outpt cardiac eval  -endocrine consult for DM management- resume insulin from previous admission  -monitor tele while undergoing aggressive diuresis  - further chart review, patient declined stress test and cath  -podiatry consulted, her wounds are due to fluid overload- discussed with pod, they do not believe her legs are cellulitic, they appear improved from last admission.  Patient noted with one episode of fever checked rectally. She does not display any signs and symptoms of any infection. Will DEFER antibiotics for now, continue watching fever, trend wbc, follow blood cultres, may ask ID to followup. 64 female with uncontrolled DM, diabetic foot ulcers, chfrEF who presented with foot wound due to fluid overload.  Patient was discharged within a week ago after being diuresed with IV lasix and went home with furosemide 40mg po qdaily.  She notes not marked urine output on po furosemide.   SHe was also in process of trying to get entresto but her outpatient physician was in process.  Due to uncontrolled swelling she presented back to hospital.  Patient reported a fever but transient, she reports no cough, no chills, no abdominal pain, no dysuria.    PE:No distress, speaking complete sentences without pause. her lung sounds diminished more so on the right, she has bilateral legs wrapped, her toes are shiny and edematous.    Assessment and Plan:   Patient is a 65 yo woman with PMH significant for CHFrEF DM2 c/b diabetic foot ulcer who presents with shortness of breath, cough, orthopnea admitted for continued heart failure exacerbation.     #Acute HFrEF exacerbation  #Diabetic Foot ulcer  #DM2  #Transient Fever      Change furosemide to IV bumex 1mg bid, check i+os, daily weights  - prior to discharge ideally should be monitored on maintenance PO medications before DC  New decreased EF- patient previously declined cath and NST  - Hx of CAD, new decreased EF should at minimum be on aspirin, start high intensity statin in setting of CAD AND DM  Check Lipid panel check TSH. Will discuss with cardiology regarding inpt vs outpt cardiac eval  -endocrine consult for DM management- resume insulin from previous admission  -monitor tele while undergoing aggressive diuresis  - further chart review, patient declined stress test and cath- will reintroduce  -podiatry consulted, her wounds are due to fluid overload- discussed with pod, they do not believe her legs are cellulitic, they appear improved from last admission.  Patient noted with one episode of fever checked rectally. She does not display any signs and symptoms of any infection. Will DEFER antibiotics for now, continue watching fever, trend wbc, follow blood cultres, may ask ID to followup.

## 2025-01-23 NOTE — H&P ADULT - HISTORY OF PRESENT ILLNESS
64F, ambulates independently at baseline, hx of CHF (last TTE on 1/16/25 EF 30-35%, G2DD), DM, diabetic foot ulcer with a recent admission at [facility] for CHF exacerbation 1/14-1/17, p/w worsening right leg swelling and pain. As per patient, from last admission, patient has noticed that right leg has continued to "secrete" fluids soaking and requiring drying every 2-3 hours. There is increased pain for which patient took Tylenol with no relief and she is unable to ambulate now. She endorses improvement in her left leg and can bear weight on that with no issues. She denies having any history of fever, redness, hot to touch of the right leg since her previous admission. Patient endorses her home medication of furosemide 40 daily is not helping and describes her urine output to be "decreased" and prefers the IV lasix. She continues to endorse orthopnea and shortness of breath w/ exertion. Inquiry about if swelling has progressed to her stomach, she denies saying it is mid-thigh bilaterally only. When inquired about her home CHF medications, she endorses that the Entresto has not been approved by insurance yet so she is currently not taking it.

## 2025-01-23 NOTE — CONSULT NOTE ADULT - TIME BILLING
- Review of records, telemetry, vital signs and daily labs.   - General and cardiovascular physical examination.  - Generation of cardiovascular treatment plan and completion of note .  - Coordination of care.      Patient was seen and examined by me on  1/23/25,interim events noted,labs and radiology studies reviewed.  Yordy Gilmore MD,FACC.  [address] [place], [place] [id number].  [phone]  Availabe to call or text on [employer] TE[employer].

## 2025-01-24 ENCOUNTER — RESULT REVIEW (OUTPATIENT)
Age: 65
End: 2025-01-24

## 2025-01-24 DIAGNOSIS — S81.809A UNSPECIFIED OPEN WOUND, UNSPECIFIED LOWER LEG, INITIAL ENCOUNTER: ICD-10-CM

## 2025-01-24 LAB
A1C WITH ESTIMATED AVERAGE GLUCOSE RESULT: 11.6 % — HIGH (ref 4–5.6)
ALBUMIN SERPL ELPH-MCNC: 2.7 G/DL — LOW (ref 3.5–5)
ALP SERPL-CCNC: 77 U/L — SIGNIFICANT CHANGE UP (ref 40–120)
ALT FLD-CCNC: 35 U/L DA — SIGNIFICANT CHANGE UP (ref 10–60)
ANION GAP SERPL CALC-SCNC: 9 MMOL/L — SIGNIFICANT CHANGE UP (ref 5–17)
AST SERPL-CCNC: 51 U/L — HIGH (ref 10–40)
BASOPHILS # BLD AUTO: 0.11 K/UL — SIGNIFICANT CHANGE UP (ref 0–0.2)
BASOPHILS NFR BLD AUTO: 1 % — SIGNIFICANT CHANGE UP (ref 0–2)
BILIRUB SERPL-MCNC: 0.7 MG/DL — SIGNIFICANT CHANGE UP (ref 0.2–1.2)
BUN SERPL-MCNC: 33 MG/DL — HIGH (ref 7–18)
CALCIUM SERPL-MCNC: 8.4 MG/DL — SIGNIFICANT CHANGE UP (ref 8.4–10.5)
CHLORIDE SERPL-SCNC: 103 MMOL/L — SIGNIFICANT CHANGE UP (ref 96–108)
CHOLEST SERPL-MCNC: 122 MG/DL — SIGNIFICANT CHANGE UP
CO2 SERPL-SCNC: 26 MMOL/L — SIGNIFICANT CHANGE UP (ref 22–31)
CREAT SERPL-MCNC: 1.17 MG/DL — SIGNIFICANT CHANGE UP (ref 0.5–1.3)
EGFR: 52 ML/MIN/1.73M2 — LOW
EOSINOPHIL # BLD AUTO: 0.66 K/UL — HIGH (ref 0–0.5)
EOSINOPHIL NFR BLD AUTO: 5.8 % — SIGNIFICANT CHANGE UP (ref 0–6)
ESTIMATED AVERAGE GLUCOSE: 286 MG/DL — HIGH (ref 68–114)
GLUCOSE BLDC GLUCOMTR-MCNC: 113 MG/DL — HIGH (ref 70–99)
GLUCOSE BLDC GLUCOMTR-MCNC: 185 MG/DL — HIGH (ref 70–99)
GLUCOSE BLDC GLUCOMTR-MCNC: 188 MG/DL — HIGH (ref 70–99)
GLUCOSE BLDC GLUCOMTR-MCNC: 87 MG/DL — SIGNIFICANT CHANGE UP (ref 70–99)
GLUCOSE BLDC GLUCOMTR-MCNC: 92 MG/DL — SIGNIFICANT CHANGE UP (ref 70–99)
GLUCOSE SERPL-MCNC: 150 MG/DL — HIGH (ref 70–99)
HCT VFR BLD CALC: 28.7 % — LOW (ref 34.5–45)
HDLC SERPL-MCNC: 39 MG/DL — LOW
HGB BLD-MCNC: 9.9 G/DL — LOW (ref 11.5–15.5)
IMM GRANULOCYTES NFR BLD AUTO: 0.3 % — SIGNIFICANT CHANGE UP (ref 0–0.9)
LACTATE SERPL-SCNC: 1.1 MMOL/L — SIGNIFICANT CHANGE UP (ref 0.7–2)
LIPID PNL WITH DIRECT LDL SERPL: 66 MG/DL — SIGNIFICANT CHANGE UP
LYMPHOCYTES # BLD AUTO: 28.8 % — SIGNIFICANT CHANGE UP (ref 13–44)
LYMPHOCYTES # BLD AUTO: 3.27 K/UL — SIGNIFICANT CHANGE UP (ref 1–3.3)
MAGNESIUM SERPL-MCNC: 2.3 MG/DL — SIGNIFICANT CHANGE UP (ref 1.6–2.6)
MCHC RBC-ENTMCNC: 25.4 PG — LOW (ref 27–34)
MCHC RBC-ENTMCNC: 34.5 G/DL — SIGNIFICANT CHANGE UP (ref 32–36)
MCV RBC AUTO: 73.6 FL — LOW (ref 80–100)
MONOCYTES # BLD AUTO: 0.8 K/UL — SIGNIFICANT CHANGE UP (ref 0–0.9)
MONOCYTES NFR BLD AUTO: 7 % — SIGNIFICANT CHANGE UP (ref 2–14)
NEUTROPHILS # BLD AUTO: 6.5 K/UL — SIGNIFICANT CHANGE UP (ref 1.8–7.4)
NEUTROPHILS NFR BLD AUTO: 57.1 % — SIGNIFICANT CHANGE UP (ref 43–77)
NON HDL CHOLESTEROL: 83 MG/DL — SIGNIFICANT CHANGE UP
NRBC # BLD: 0 /100 WBCS — SIGNIFICANT CHANGE UP (ref 0–0)
NRBC BLD-RTO: 0 /100 WBCS — SIGNIFICANT CHANGE UP (ref 0–0)
PHOSPHATE SERPL-MCNC: 3.9 MG/DL — SIGNIFICANT CHANGE UP (ref 2.5–4.5)
PLATELET # BLD AUTO: 319 K/UL — SIGNIFICANT CHANGE UP (ref 150–400)
POTASSIUM SERPL-MCNC: 4.2 MMOL/L — SIGNIFICANT CHANGE UP (ref 3.5–5.3)
POTASSIUM SERPL-SCNC: 4.2 MMOL/L — SIGNIFICANT CHANGE UP (ref 3.5–5.3)
PROCALCITONIN SERPL-MCNC: 0.27 NG/ML — HIGH (ref 0.02–0.1)
PROT SERPL-MCNC: 7.2 G/DL — SIGNIFICANT CHANGE UP (ref 6–8.3)
RBC # BLD: 3.9 M/UL — SIGNIFICANT CHANGE UP (ref 3.8–5.2)
RBC # FLD: 15.3 % — HIGH (ref 10.3–14.5)
SODIUM SERPL-SCNC: 138 MMOL/L — SIGNIFICANT CHANGE UP (ref 135–145)
TRIGL SERPL-MCNC: 89 MG/DL — SIGNIFICANT CHANGE UP
TSH SERPL-MCNC: 5.34 UU/ML — HIGH (ref 0.34–4.82)
WBC # BLD: 11.37 K/UL — HIGH (ref 3.8–10.5)
WBC # FLD AUTO: 11.37 K/UL — HIGH (ref 3.8–10.5)

## 2025-01-24 PROCEDURE — 93018 CV STRESS TEST I&R ONLY: CPT

## 2025-01-24 PROCEDURE — 93016 CV STRESS TEST SUPVJ ONLY: CPT

## 2025-01-24 PROCEDURE — 78452 HT MUSCLE IMAGE SPECT MULT: CPT | Mod: 26

## 2025-01-24 PROCEDURE — 99233 SBSQ HOSP IP/OBS HIGH 50: CPT

## 2025-01-24 RX ORDER — MORPHINE SULFATE 60 MG/1
2 TABLET, FILM COATED, EXTENDED RELEASE ORAL ONCE
Refills: 0 | Status: DISCONTINUED | OUTPATIENT
Start: 2025-01-24 | End: 2025-01-24

## 2025-01-24 RX ORDER — KETOROLAC TROMETHAMINE 10 MG
15 TABLET ORAL DAILY
Refills: 0 | Status: DISCONTINUED | OUTPATIENT
Start: 2025-01-24 | End: 2025-01-25

## 2025-01-24 RX ORDER — KETOROLAC TROMETHAMINE 10 MG
15 TABLET ORAL ONCE
Refills: 0 | Status: DISCONTINUED | OUTPATIENT
Start: 2025-01-24 | End: 2025-01-24

## 2025-01-24 RX ADMIN — ASPIRIN 81 MILLIGRAM(S): 81 TABLET, COATED ORAL at 13:17

## 2025-01-24 RX ADMIN — Medication 10 UNIT(S): at 13:17

## 2025-01-24 RX ADMIN — Medication 15 MILLIGRAM(S): at 20:36

## 2025-01-24 RX ADMIN — MORPHINE SULFATE 2 MILLIGRAM(S): 60 TABLET, FILM COATED, EXTENDED RELEASE ORAL at 09:10

## 2025-01-24 RX ADMIN — INSULIN GLARGINE-YFGN 30 UNIT(S): 100 INJECTION, SOLUTION SUBCUTANEOUS at 22:33

## 2025-01-24 RX ADMIN — SACUBITRIL AND VALSARTAN 1 TABLET(S): 49; 51 TABLET, FILM COATED ORAL at 17:27

## 2025-01-24 RX ADMIN — ENOXAPARIN SODIUM 40 MILLIGRAM(S): 100 INJECTION SUBCUTANEOUS at 17:28

## 2025-01-24 RX ADMIN — Medication 15 MILLIGRAM(S): at 01:54

## 2025-01-24 RX ADMIN — Medication 15 MILLIGRAM(S): at 02:00

## 2025-01-24 RX ADMIN — MORPHINE SULFATE 2 MILLIGRAM(S): 60 TABLET, FILM COATED, EXTENDED RELEASE ORAL at 10:37

## 2025-01-24 RX ADMIN — Medication 15 MILLIGRAM(S): at 21:36

## 2025-01-24 RX ADMIN — BUMETANIDE 1 MILLIGRAM(S): 2 TABLET ORAL at 16:59

## 2025-01-24 RX ADMIN — ATORVASTATIN CALCIUM 40 MILLIGRAM(S): 80 TABLET, FILM COATED ORAL at 22:07

## 2025-01-24 NOTE — PROGRESS NOTE ADULT - PROBLEM SELECTOR PLAN 1
Had fever 101.5 on admission  patient denying any recent history of fever  UA negative  COVID/viral panel negative  Abdomen without pain, soft nontender  discussed with podiatry lower extremity and reviewed photos- lower extremity appears improved compared to prior  Xray with bilateral effusions- check CT chest    -Continue monitoring wbc and fevers- if recurs, start [place] spectrum  -No source of infection thus far, hold antibiotics  -f/u CT chest

## 2025-01-24 NOTE — PROGRESS NOTE ADULT - PROBLEM SELECTOR PLAN 4
Podiatry following, b/l serous bullae, no concerns for infection  Consulted pain management  Will give morphine 2mg x 1 prior to stress test

## 2025-01-24 NOTE — PROGRESS NOTE ADULT - SUBJECTIVE AND OBJECTIVE BOX
Refused stress test this AM.  Main complaints are no communication (claimed she did not know she was going for stress test).  Also uncontrolled LE pain, did not get food, and uncomfortable in ED setting.  Brother Galo at bedside.  Currently b/l LE pain is 9.5/10, some improvement with toradol but still persistent.  No SOB, LH/dizziness, palpitations, chest pain, fevers/chills.  LBM yesterday.    Allergies    No Known Allergies    Intolerances      Vital Signs Last 24 Hrs  T(C): 36.7 (24 Jan 2025 08:00), Max: 38.6 (23 Jan 2025 10:23)  T(F): 98 (24 Jan 2025 08:00), Max: 101.5 (23 Jan 2025 10:23)  HR: 82 (24 Jan 2025 08:00) (80 - 125)  BP: 101/61 (24 Jan 2025 08:00) (101/61 - 127/62)  BP(mean): 82 (24 Jan 2025 04:54) (76 - 82)  RR: 18 (24 Jan 2025 08:00) (16 - 22)  SpO2: 97% (24 Jan 2025 08:00) (94% - 97%)    Parameters below as of 24 Jan 2025 08:00  Patient On (Oxygen Delivery Method): room air        MedsMEDICATIONS  (STANDING):  aspirin  chewable 81 milliGRAM(s) Oral daily  atorvastatin 40 milliGRAM(s) Oral at bedtime  buMETAnide Injectable 1 milliGRAM(s) IV Push two times a day  enoxaparin Injectable 40 milliGRAM(s) SubCutaneous every 24 hours  insulin glargine Injectable (LANTUS) 30 Unit(s) SubCutaneous at bedtime  insulin lispro (ADMELOG) corrective regimen sliding scale   SubCutaneous three times a day before meals  insulin lispro (ADMELOG) corrective regimen sliding scale   SubCutaneous at bedtime  insulin lispro Injectable (ADMELOG) 10 Unit(s) SubCutaneous three times a day before meals  metoprolol succinate ER 25 milliGRAM(s) Oral daily  morphine  - Injectable 2 milliGRAM(s) IV Push once  sacubitril 24 mG/valsartan 26 mG 1 Tablet(s) Oral two times a day    MEDICATIONS  (PRN):  acetaminophen     Tablet .. 650 milliGRAM(s) Oral every 6 hours PRN Temp greater or equal to 38C (100.4F), Mild Pain (1 - 3)  melatonin 3 milliGRAM(s) Oral at bedtime PRN Insomnia  ondansetron Injectable 4 milliGRAM(s) IV Push every 8 hours PRN Nausea and/or Vomiting      PHYSICAL EXAM:  GENERAL: NAD, well-groomed, well-developed  NEURO: AAOx3, WILLI b/l, 5/5 b/l UE and 5/5 b/l LE motor strength  LUNG: Lungs clear to auscultation bilaterally, no wheezing, rhonchi, or rales.  HEART: S1, S2, no S3 or S4. Regular rate and rhythm. No murmurs, gallops, or rubs. No JVD  ABDOMEN: Bowel sounds present, abd Soft, Nontender, Nondistended  EXTREMITIES:  +2 b/l LE edema to calf, b/l LE ACE wrapped, no overt drainage.    Consultant(s) Notes Reviewed:  [x ] YES  [ ] NO  Care Discussed with Consultants/Other Providers [ x] YES  [ ] NO    LABS:                        9.9    11.37 )-----------( 319      ( 24 Jan 2025 05:40 )             28.7     01-24    138  |  103  |  33[H]  ----------------------------<  150[H]  4.2   |  26  |  1.17    Ca    8.4      24 Jan 2025 05:40  Phos  3.9     01-24  Mg     2.3     01-24    TPro  7.2  /  Alb  2.7[L]  /  TBili  0.7  /  DBili  x   /  AST  51[H]  /  ALT  35  /  AlkPhos  77  01-24    PT/INR - ( 23 Jan 2025 10:36 )   PT: 13.4 sec;   INR: 1.16 ratio         PTT - ( 23 Jan 2025 10:36 )  PTT:31.1 sec    RADIOLOGY & ADDITIONAL TESTS:    EKG:

## 2025-01-24 NOTE — PROGRESS NOTE ADULT - PROBLEM SELECTOR PLAN 2
Hx of CHF, previous admission in January, on home Lasix 40 qD, Metoprolol Succ 25 qD. Not on Entresto due to insurance issues  Last TTE: LVSF moderately decreased w/ EF 30-35 %. Moderate G2DD. Mild MR  Cardio consulted, Dr. Gilmore rec ischemic eval, pt now agreeable for stress test  Thoroughly discussed plan with pt and brother at bedside    -Admit to tele + q4 VS  -Strict I/Os  -Fluid Restrict 1.5 L  -Daily Weights  -IV Bumex 1 BID  -c/w Metoprolol Succ 25 qD  -Restarted entresto 24/26 BID  -f/u Cardio recs

## 2025-01-24 NOTE — PROGRESS NOTE ADULT - ASSESSMENT
64F, hx of CHF (last TTE on 1/16/25 EF 30-35%, G2DD), DM, diabetic foot ulcer with a recent admission at [facility] for CHF exacerbation 1/14-1/17 p/w worsening R. leg pain and fluid secretion, was meeting sepsis criteria and admitted for continued management of CHF exacerbation      # SIRS (systemic inflammatory response syndrome).   # HF   # CAD  abx per Primary team   Lasix 40 qD, Metoprolol Succ 25 qD. Not on Entresto due to insurance issues  -c/w Metoprolol Succ 25 qD  -Resume Entresto 24/26 BID  -aspirin 81 qD  -Atorvastatin 40 qD

## 2025-01-24 NOTE — PATIENT PROFILE ADULT - HAVE YOU BEEN EATING POORLY BECAUSE OF A DECREASED APPETITE?
No (0) [place]poni Counseling:  I discussed with the patient the risks of golimumab including but not limited to myelosuppression, immunosuppression, autoimmune hepatitis, demyelinating diseases, lymphoma, and serious infections.  The patient understands that monitoring is required including a PPD at baseline and must alert us or the primary physician if symptoms of infection or other concerning signs are noted.

## 2025-01-24 NOTE — PROGRESS NOTE ADULT - PROBLEM SELECTOR PLAN 3
Hx of DM on home Tresiba 30u, Cequer insulin pump  Endo consulted, Dr. Bhagat    -Restarted on previous admission insulin regimen:  Lantus 30u bedtime + low ISS  Lispro 10u TID + low ISS  -f/u A1c  -f/u Endo recs

## 2025-01-24 NOTE — PROGRESS NOTE ADULT - ASSESSMENT
64F, hx of CHF (last TTE on 1/16/25 EF 30-35%, G2DD), DM, diabetic foot ulcer with a recent admission at [facility] for CHF exacerbation 1/14-1/17 p/w worsening R. leg pain and fluid secretion, was meeting sepsis criteria with podiatry having low suspicion for right cellulitis and admitted for continued management of CHF exacerbation and needing ischemic eval.

## 2025-01-24 NOTE — PROGRESS NOTE ADULT - SUBJECTIVE AND OBJECTIVE BOX
PATIENT SEEN AND EXAMINED BY KHAI REEVES M.D. ON :- 1/24/25  DATE OF SERVICE:      1/24/25       Interim events noted,Labs ,Radiological studies and Cardiology tests reviewed .    Patient is a 64y old  Female who presents with a chief complaint of R. Leg pain and swelling, new HFrEF (24 Jan 2025 09:05)      HPI:  64F, ambulates independently at baseline, hx of CHF (last TTE on 1/16/25 EF 30-35%, G2DD), DM, diabetic foot ulcer with a recent admission at [facility] for CHF exacerbation 1/14-1/17, p/w worsening right leg swelling and pain. As per patient, from last admission, patient has noticed that right leg has continued to "secrete" fluids soaking and requiring drying every 2-3 hours. There is increased pain for which patient took Tylenol with no relief and she is unable to ambulate now. She endorses improvement in her left leg and can bear weight on that with no issues. She denies having any history of fever, redness, hot to touch of the right leg since her previous admission. Patient endorses her home medication of furosemide 40 daily is not helping and describes her urine output to be "decreased" and prefers the IV lasix. She continues to endorse orthopnea and shortness of breath w/ exertion. Inquiry about if swelling has progressed to her stomach, she denies saying it is mid-thigh bilaterally only. When inquired about her home CHF medications, she endorses that the Entresto has not been approved by insurance yet so she is currently not taking it.  (23 Jan 2025 16:21)      PAST MEDICAL & SURGICAL HISTORY:  DM (diabetes mellitus)      Diabetic foot ulcer      Congestive heart failure (CHF)      CAD (coronary artery disease)      Cataract of both eyes, unspecified cataract type      History of cholecystectomy          PREVIOUS DIAGNOSTIC TESTING:      ECHO  FINDINGS:    STRESS  FINDINGS:    CATHETERIZATION  FINDINGS:    MEDICATIONS  (STANDING):  aspirin  chewable 81 milliGRAM(s) Oral daily  atorvastatin 40 milliGRAM(s) Oral at bedtime  buMETAnide Injectable 1 milliGRAM(s) IV Push two times a day  enoxaparin Injectable 40 milliGRAM(s) SubCutaneous every 24 hours  insulin glargine Injectable (LANTUS) 30 Unit(s) SubCutaneous at bedtime  insulin lispro (ADMELOG) corrective regimen sliding scale   SubCutaneous three times a day before meals  insulin lispro (ADMELOG) corrective regimen sliding scale   SubCutaneous at bedtime  insulin lispro Injectable (ADMELOG) 10 Unit(s) SubCutaneous three times a day before meals  metoprolol succinate ER 25 milliGRAM(s) Oral daily  sacubitril 24 mG/valsartan 26 mG 1 Tablet(s) Oral two times a day    MEDICATIONS  (PRN):  acetaminophen     Tablet .. 650 milliGRAM(s) Oral every 6 hours PRN Temp greater or equal to 38C (100.4F), Mild Pain (1 - 3)  ketorolac   Injectable 15 milliGRAM(s) IV Push daily PRN Moderate Pain (4 - 6)  melatonin 3 milliGRAM(s) Oral at bedtime PRN Insomnia  ondansetron Injectable 4 milliGRAM(s) IV Push every 8 hours PRN Nausea and/or Vomiting      FAMILY HISTORY:  Family history of CHF (congestive heart failure) (Mother)        SOCIAL HISTORY:    CIGARETTES:    ALCOHOL:    REVIEW OF SYSTEMS:  CONSTITUTIONAL: No fever, weight loss, or fatigue  EYES: No eye pain, visual disturbances, or discharge  ENMT:  No difficulty hearing, tinnitus, vertigo; No sinus or throat pain  NECK: No pain or stiffness  RESPIRATORY: No cough, wheezing, chills or hemoptysis; No shortness of breath  CARDIOVASCULAR: No chest pain, palpitations, dizziness, or leg swelling  GASTROINTESTINAL: No abdominal or epigastric pain. No nausea, vomiting, or hematemesis; No diarrhea or constipation. No melena or hematochezia.  GENITOURINARY: No dysuria, frequency, hematuria, or incontinence  NEUROLOGICAL: No headaches, memory loss, loss of strength, numbness, or tremors  SKIN: No itching, burning, rashes, or lesions   LYMPH NODES: No enlarged glands  ENDOCRINE: No heat or cold intolerance; No hair loss  MUSCULOSKELETAL: No joint pain or swelling; No muscle, back, or extremity pain  PSYCHIATRIC: No depression, anxiety, mood swings, or difficulty sleeping  HEME/LYMPH: No easy bruising, or bleeding gums  ALLERY AND IMMUNOLOGIC: No hives or eczema    Vital Signs Last 24 Hrs  T(C): 37 (24 Jan 2025 20:32), Max: 37.1 (24 Jan 2025 14:42)  T(F): 98.6 (24 Jan 2025 20:32), Max: 98.8 (24 Jan 2025 14:42)  HR: 99 (24 Jan 2025 20:32) (80 - 99)  BP: 114/66 (24 Jan 2025 20:32) (95/46 - 127/62)  BP(mean): 61 (24 Jan 2025 14:42) (61 - 82)  RR: 18 (24 Jan 2025 20:32) (18 - 22)  SpO2: 97% (24 Jan 2025 20:32) (95% - 98%)    Parameters below as of 24 Jan 2025 20:32  Patient On (Oxygen Delivery Method): room air          PHYSICAL EXAM:  GENERAL: NAD, well-groomed, well-developed  HEAD:  Atraumatic, Normocephalic  EYES: EOMI, PERRLA, conjunctiva and sclera clear  ENMT: No tonsillar erythema, exudates, or enlargement; Moist mucous membranes, Good dentition, No lesions  NECK: Supple, No JVD, Normal thyroid  NERVOUS SYSTEM:  Alert & Oriented X3, Good concentration; Motor Strength 5/5 B/L upper and lower extremities; DTRs 2+ intact and symmetric  CHEST/LUNG: Clear to percussion bilaterally; No rales, rhonchi, wheezing, or rubs  HEART: Regular rate and rhythm; No murmurs, rubs, or gallops  ABDOMEN: Soft, Nontender, Nondistended; Bowel sounds present  EXTREMITIES:  2+ Peripheral Pulses, No clubbing, cyanosis, or edema  LYMPH: No lymphadenopathy noted  SKIN: No rashes or lesions      INTERPRETATION OF TELEMETRY:    ECG:    CHADSVASC:     LABS:                        9.9    11.37 )-----------( 319      ( 24 Jan 2025 05:40 )             28.7     01-24    138  |  103  |  33[H]  ----------------------------<  150[H]  4.2   |  26  |  1.17    Ca    8.4      24 Jan 2025 05:40  Phos  3.9     01-24  Mg     2.3     01-24    TPro  7.2  /  Alb  2.7[L]  /  TBili  0.7  /  DBili  x   /  AST  51[H]  /  ALT  35  /  AlkPhos  77  01-24        PT/INR - ( 23 Jan 2025 10:36 )   PT: 13.4 sec;   INR: 1.16 ratio         PTT - ( 23 Jan 2025 10:36 )  PTT:31.1 sec  Urinalysis Basic - ( 24 Jan 2025 05:40 )    Color: x / Appearance: x / SG: x / pH: x  Gluc: 150 mg/dL / Ketone: x  / Bili: x / Urobili: x   Blood: x / Protein: x / Nitrite: x   Leuk Esterase: x / RBC: x / WBC x   Sq Epi: x / Non Sq Epi: x / Bacteria: x      Lipid Panel: Cholesterol, Serum 122  Direct LDL --  HDL Cholesterol, Serum 39  Triglycerides, Serum 89    I&O's Summary    24 Jan 2025 07:01  -  24 Jan 2025 22:33  --------------------------------------------------------  IN: 0 mL / OUT: 275 mL / NET: -275 mL        RADIOLOGY & ADDITIONAL STUDIES:    < from: TTE W or WO Ultrasound Enhancing Agent (01.16.25 @ 12:45) >     CONCLUSIONS:      1. Left ventricular systolic function is moderately decreased with an ejection fraction visually estimated at 30 to 35 %.   2. There is moderate (grade 2) left ventricular diastolic dysfunction.   3. Mild mitral regurgitation.   4. Trace tricuspid regurgitation.   5. Trace pulmonic regurgitation.   6. Trace pericardial effusion.   7. Right pleural effusion noted.   8. No prior echocardiogram is available for comparison.    < end of copied text >

## 2025-01-25 LAB
ALBUMIN SERPL ELPH-MCNC: 2.7 G/DL — LOW (ref 3.5–5)
ALP SERPL-CCNC: 73 U/L — SIGNIFICANT CHANGE UP (ref 40–120)
ALT FLD-CCNC: 36 U/L DA — SIGNIFICANT CHANGE UP (ref 10–60)
ANION GAP SERPL CALC-SCNC: 10 MMOL/L — SIGNIFICANT CHANGE UP (ref 5–17)
ANION GAP SERPL CALC-SCNC: 8 MMOL/L — SIGNIFICANT CHANGE UP (ref 5–17)
AST SERPL-CCNC: 30 U/L — SIGNIFICANT CHANGE UP (ref 10–40)
BILIRUB SERPL-MCNC: 0.6 MG/DL — SIGNIFICANT CHANGE UP (ref 0.2–1.2)
BUN SERPL-MCNC: 41 MG/DL — HIGH (ref 7–18)
BUN SERPL-MCNC: 43 MG/DL — HIGH (ref 7–18)
CALCIUM SERPL-MCNC: 8.6 MG/DL — SIGNIFICANT CHANGE UP (ref 8.4–10.5)
CALCIUM SERPL-MCNC: 8.7 MG/DL — SIGNIFICANT CHANGE UP (ref 8.4–10.5)
CHLORIDE SERPL-SCNC: 101 MMOL/L — SIGNIFICANT CHANGE UP (ref 96–108)
CHLORIDE SERPL-SCNC: 103 MMOL/L — SIGNIFICANT CHANGE UP (ref 96–108)
CO2 SERPL-SCNC: 27 MMOL/L — SIGNIFICANT CHANGE UP (ref 22–31)
CO2 SERPL-SCNC: 27 MMOL/L — SIGNIFICANT CHANGE UP (ref 22–31)
CREAT SERPL-MCNC: 1.25 MG/DL — SIGNIFICANT CHANGE UP (ref 0.5–1.3)
CREAT SERPL-MCNC: 1.25 MG/DL — SIGNIFICANT CHANGE UP (ref 0.5–1.3)
EGFR: 48 ML/MIN/1.73M2 — LOW
EGFR: 48 ML/MIN/1.73M2 — LOW
GLUCOSE BLDC GLUCOMTR-MCNC: 115 MG/DL — HIGH (ref 70–99)
GLUCOSE BLDC GLUCOMTR-MCNC: 120 MG/DL — HIGH (ref 70–99)
GLUCOSE BLDC GLUCOMTR-MCNC: 69 MG/DL — LOW (ref 70–99)
GLUCOSE BLDC GLUCOMTR-MCNC: 91 MG/DL — SIGNIFICANT CHANGE UP (ref 70–99)
GLUCOSE SERPL-MCNC: 112 MG/DL — HIGH (ref 70–99)
GLUCOSE SERPL-MCNC: 87 MG/DL — SIGNIFICANT CHANGE UP (ref 70–99)
HCT VFR BLD CALC: 27.9 % — LOW (ref 34.5–45)
HCT VFR BLD CALC: 28.6 % — LOW (ref 34.5–45)
HGB BLD-MCNC: 9.7 G/DL — LOW (ref 11.5–15.5)
HGB BLD-MCNC: 9.9 G/DL — LOW (ref 11.5–15.5)
LACTATE SERPL-SCNC: 1 MMOL/L — SIGNIFICANT CHANGE UP (ref 0.7–2)
MAGNESIUM SERPL-MCNC: 2.1 MG/DL — SIGNIFICANT CHANGE UP (ref 1.6–2.6)
MCHC RBC-ENTMCNC: 25.2 PG — LOW (ref 27–34)
MCHC RBC-ENTMCNC: 25.8 PG — LOW (ref 27–34)
MCHC RBC-ENTMCNC: 34.6 G/DL — SIGNIFICANT CHANGE UP (ref 32–36)
MCHC RBC-ENTMCNC: 34.8 G/DL — SIGNIFICANT CHANGE UP (ref 32–36)
MCV RBC AUTO: 72.8 FL — LOW (ref 80–100)
MCV RBC AUTO: 74.2 FL — LOW (ref 80–100)
NRBC # BLD: 0 /100 WBCS — SIGNIFICANT CHANGE UP (ref 0–0)
NRBC # BLD: 0 /100 WBCS — SIGNIFICANT CHANGE UP (ref 0–0)
NRBC BLD-RTO: 0 /100 WBCS — SIGNIFICANT CHANGE UP (ref 0–0)
NRBC BLD-RTO: 0 /100 WBCS — SIGNIFICANT CHANGE UP (ref 0–0)
PHOSPHATE SERPL-MCNC: 3.5 MG/DL — SIGNIFICANT CHANGE UP (ref 2.5–4.5)
PLATELET # BLD AUTO: 345 K/UL — SIGNIFICANT CHANGE UP (ref 150–400)
PLATELET # BLD AUTO: 360 K/UL — SIGNIFICANT CHANGE UP (ref 150–400)
POTASSIUM SERPL-MCNC: 3.8 MMOL/L — SIGNIFICANT CHANGE UP (ref 3.5–5.3)
POTASSIUM SERPL-MCNC: 4.5 MMOL/L — SIGNIFICANT CHANGE UP (ref 3.5–5.3)
POTASSIUM SERPL-SCNC: 3.8 MMOL/L — SIGNIFICANT CHANGE UP (ref 3.5–5.3)
POTASSIUM SERPL-SCNC: 4.5 MMOL/L — SIGNIFICANT CHANGE UP (ref 3.5–5.3)
PROT SERPL-MCNC: 7.4 G/DL — SIGNIFICANT CHANGE UP (ref 6–8.3)
RBC # BLD: 3.76 M/UL — LOW (ref 3.8–5.2)
RBC # BLD: 3.93 M/UL — SIGNIFICANT CHANGE UP (ref 3.8–5.2)
RBC # FLD: 14.9 % — HIGH (ref 10.3–14.5)
RBC # FLD: 15.1 % — HIGH (ref 10.3–14.5)
SODIUM SERPL-SCNC: 138 MMOL/L — SIGNIFICANT CHANGE UP (ref 135–145)
SODIUM SERPL-SCNC: 138 MMOL/L — SIGNIFICANT CHANGE UP (ref 135–145)
WBC # BLD: 13.81 K/UL — HIGH (ref 3.8–10.5)
WBC # BLD: 14.26 K/UL — HIGH (ref 3.8–10.5)
WBC # FLD AUTO: 13.81 K/UL — HIGH (ref 3.8–10.5)
WBC # FLD AUTO: 14.26 K/UL — HIGH (ref 3.8–10.5)

## 2025-01-25 PROCEDURE — 99233 SBSQ HOSP IP/OBS HIGH 50: CPT | Mod: GC

## 2025-01-25 PROCEDURE — G0545: CPT

## 2025-01-25 PROCEDURE — 99233 SBSQ HOSP IP/OBS HIGH 50: CPT

## 2025-01-25 PROCEDURE — 71250 CT THORAX DX C-: CPT | Mod: 26

## 2025-01-25 RX ORDER — LIDOCAINE HYDROCHLORIDE 30 MG/G
2 CREAM TOPICAL DAILY
Refills: 0 | Status: DISCONTINUED | OUTPATIENT
Start: 2025-01-25 | End: 2025-01-25

## 2025-01-25 RX ORDER — OXYCODONE HYDROCHLORIDE 30 MG/1
5 TABLET ORAL EVERY 6 HOURS
Refills: 0 | Status: DISCONTINUED | OUTPATIENT
Start: 2025-01-25 | End: 2025-01-26

## 2025-01-25 RX ORDER — GABAPENTIN 800 MG/1
100 TABLET ORAL AT BEDTIME
Refills: 0 | Status: DISCONTINUED | OUTPATIENT
Start: 2025-01-25 | End: 2025-01-27

## 2025-01-25 RX ORDER — AMPICILLIN SODIUM AND SULBACTAM SODIUM 2; 1 G/1; G/1
3 INJECTION, POWDER, FOR SOLUTION INTRAMUSCULAR; INTRAVENOUS ONCE
Refills: 0 | Status: COMPLETED | OUTPATIENT
Start: 2025-01-25 | End: 2025-01-25

## 2025-01-25 RX ORDER — INSULIN GLARGINE-YFGN 100 [IU]/ML
25 INJECTION, SOLUTION SUBCUTANEOUS AT BEDTIME
Refills: 0 | Status: DISCONTINUED | OUTPATIENT
Start: 2025-01-25 | End: 2025-01-26

## 2025-01-25 RX ORDER — ACETAMINOPHEN 160 MG/5ML
1000 SUSPENSION ORAL EVERY 12 HOURS
Refills: 0 | Status: DISCONTINUED | OUTPATIENT
Start: 2025-01-25 | End: 2025-01-27

## 2025-01-25 RX ORDER — BUMETANIDE 2 MG/1
1 TABLET ORAL
Refills: 0 | Status: DISCONTINUED | OUTPATIENT
Start: 2025-01-25 | End: 2025-01-25

## 2025-01-25 RX ORDER — OXYCODONE HYDROCHLORIDE 30 MG/1
2.5 TABLET ORAL ONCE
Refills: 0 | Status: DISCONTINUED | OUTPATIENT
Start: 2025-01-25 | End: 2025-01-25

## 2025-01-25 RX ORDER — ACETAMINOPHEN 160 MG/5ML
1000 SUSPENSION ORAL ONCE
Refills: 0 | Status: COMPLETED | OUTPATIENT
Start: 2025-01-25 | End: 2025-01-25

## 2025-01-25 RX ORDER — AMPICILLIN SODIUM AND SULBACTAM SODIUM 2; 1 G/1; G/1
INJECTION, POWDER, FOR SOLUTION INTRAMUSCULAR; INTRAVENOUS
Refills: 0 | Status: DISCONTINUED | OUTPATIENT
Start: 2025-01-25 | End: 2025-01-27

## 2025-01-25 RX ORDER — OXYCODONE HYDROCHLORIDE 30 MG/1
2.5 TABLET ORAL EVERY 6 HOURS
Refills: 0 | Status: DISCONTINUED | OUTPATIENT
Start: 2025-01-25 | End: 2025-01-26

## 2025-01-25 RX ORDER — CEFTRIAXONE 250 MG/1
1000 INJECTION, POWDER, FOR SOLUTION INTRAMUSCULAR; INTRAVENOUS EVERY 24 HOURS
Refills: 0 | Status: DISCONTINUED | OUTPATIENT
Start: 2025-01-25 | End: 2025-01-25

## 2025-01-25 RX ORDER — AMPICILLIN SODIUM AND SULBACTAM SODIUM 2; 1 G/1; G/1
3 INJECTION, POWDER, FOR SOLUTION INTRAMUSCULAR; INTRAVENOUS EVERY 6 HOURS
Refills: 0 | Status: DISCONTINUED | OUTPATIENT
Start: 2025-01-26 | End: 2025-01-27

## 2025-01-25 RX ORDER — BUMETANIDE 2 MG/1
1 TABLET ORAL EVERY 12 HOURS
Refills: 0 | Status: DISCONTINUED | OUTPATIENT
Start: 2025-01-26 | End: 2025-01-27

## 2025-01-25 RX ADMIN — GABAPENTIN 100 MILLIGRAM(S): 800 TABLET ORAL at 22:07

## 2025-01-25 RX ADMIN — ASPIRIN 81 MILLIGRAM(S): 81 TABLET, COATED ORAL at 12:40

## 2025-01-25 RX ADMIN — BUMETANIDE 1 MILLIGRAM(S): 2 TABLET ORAL at 05:53

## 2025-01-25 RX ADMIN — BUMETANIDE 1 MILLIGRAM(S): 2 TABLET ORAL at 12:40

## 2025-01-25 RX ADMIN — Medication 10 UNIT(S): at 08:42

## 2025-01-25 RX ADMIN — ACETAMINOPHEN 1000 MILLIGRAM(S): 160 SUSPENSION ORAL at 04:30

## 2025-01-25 RX ADMIN — OXYCODONE HYDROCHLORIDE 2.5 MILLIGRAM(S): 30 TABLET ORAL at 10:16

## 2025-01-25 RX ADMIN — AMPICILLIN SODIUM AND SULBACTAM SODIUM 200 GRAM(S): 2; 1 INJECTION, POWDER, FOR SOLUTION INTRAMUSCULAR; INTRAVENOUS at 17:56

## 2025-01-25 RX ADMIN — ATORVASTATIN CALCIUM 40 MILLIGRAM(S): 80 TABLET, FILM COATED ORAL at 22:07

## 2025-01-25 RX ADMIN — ACETAMINOPHEN 400 MILLIGRAM(S): 160 SUSPENSION ORAL at 03:30

## 2025-01-25 RX ADMIN — OXYCODONE HYDROCHLORIDE 5 MILLIGRAM(S): 30 TABLET ORAL at 22:07

## 2025-01-25 RX ADMIN — Medication 15 MILLIGRAM(S): at 01:47

## 2025-01-25 RX ADMIN — OXYCODONE HYDROCHLORIDE 5 MILLIGRAM(S): 30 TABLET ORAL at 23:07

## 2025-01-25 RX ADMIN — OXYCODONE HYDROCHLORIDE 2.5 MILLIGRAM(S): 30 TABLET ORAL at 11:00

## 2025-01-25 RX ADMIN — OXYCODONE HYDROCHLORIDE 2.5 MILLIGRAM(S): 30 TABLET ORAL at 14:56

## 2025-01-25 RX ADMIN — INSULIN GLARGINE-YFGN 25 UNIT(S): 100 INJECTION, SOLUTION SUBCUTANEOUS at 22:07

## 2025-01-25 RX ADMIN — ACETAMINOPHEN 1000 MILLIGRAM(S): 160 SUSPENSION ORAL at 18:42

## 2025-01-25 RX ADMIN — Medication 15 MILLIGRAM(S): at 02:15

## 2025-01-25 RX ADMIN — SACUBITRIL AND VALSARTAN 1 TABLET(S): 49; 51 TABLET, FILM COATED ORAL at 17:56

## 2025-01-25 RX ADMIN — OXYCODONE HYDROCHLORIDE 2.5 MILLIGRAM(S): 30 TABLET ORAL at 14:10

## 2025-01-25 RX ADMIN — ACETAMINOPHEN 400 MILLIGRAM(S): 160 SUSPENSION ORAL at 17:03

## 2025-01-25 NOTE — PROGRESS NOTE ADULT - PROBLEM SELECTOR PLAN 3
Hx of DM on home Tresiba 30u, Cequer insulin pump  Endo consulted, Dr. Bhagat    -Restarted on previous admission insulin regimen:  Lantus 30u bedtime + low ISS  Lispro 10u TID + low ISS  -f/u A1c  -f/u Endo recs Hx of DM on home Tresiba 30u, Cequer insulin pump  Endo consulted, Dr. Bhagat  A1c 11.6  Restarted on previous admission insulin regimen on admission: Lantus 30u bedtime+low ISS, Lispro 10u TID+low ISS    -Decreased lantus to 25u + ISS ACHS  -f/u Endo recs

## 2025-01-25 NOTE — PROGRESS NOTE ADULT - SUBJECTIVE AND OBJECTIVE BOX
PGY-1 Progress Note discussed with attending    PAGER #: [[phone]] TILL 5:00 PM  PLEASE CONTACT ON CALL TEAM:  - On Call Team (Please refer to Makenna) FROM 5:00 PM - 8:30PM  - Nightfloat Team FROM 8:30 -7:30 AM      INTERVAL HPI/OVERNIGHT EVENTS:     ---------------------------    REVIEW OF SYSTEMS:  CONSTITUTIONAL: No fever, weight loss, or fatigue  RESPIRATORY: No cough, wheezing, chills or hemoptysis; No shortness of breath  CARDIOVASCULAR: No chest pain, palpitations, dizziness, or leg swelling  GASTROINTESTINAL: No abdominal pain. No nausea, vomiting, or hematemesis; No diarrhea or constipation. No melena or hematochezia.  GENITOURINARY: No dysuria or hematuria, urinary frequency  NEUROLOGICAL: No headaches, memory loss, loss of strength, numbness, or tremors  SKIN: No itching, burning, rashes, or lesions     MEDICATIONS  (STANDING):  aspirin  chewable 81 milliGRAM(s) Oral daily  atorvastatin 40 milliGRAM(s) Oral at bedtime  buMETAnide Injectable 1 milliGRAM(s) IV Push two times a day  enoxaparin Injectable 40 milliGRAM(s) SubCutaneous every 24 hours  insulin glargine Injectable (LANTUS) 30 Unit(s) SubCutaneous at bedtime  insulin lispro (ADMELOG) corrective regimen sliding scale   SubCutaneous three times a day before meals  insulin lispro (ADMELOG) corrective regimen sliding scale   SubCutaneous at bedtime  insulin lispro Injectable (ADMELOG) 10 Unit(s) SubCutaneous three times a day before meals  metoprolol succinate ER 25 milliGRAM(s) Oral daily  sacubitril 24 mG/valsartan 26 mG 1 Tablet(s) Oral two times a day    MEDICATIONS  (PRN):  acetaminophen     Tablet .. 650 milliGRAM(s) Oral every 6 hours PRN Temp greater or equal to 38C (100.4F), Mild Pain (1 - 3)  ketorolac   Injectable 15 milliGRAM(s) IV Push daily PRN Moderate Pain (4 - 6)  melatonin 3 milliGRAM(s) Oral at bedtime PRN Insomnia  ondansetron Injectable 4 milliGRAM(s) IV Push every 8 hours PRN Nausea and/or Vomiting      Vital Signs Last 24 Hrs  T(C): 37.1 (25 Jan 2025 04:30), Max: 37.1 (24 Jan 2025 14:42)  T(F): 98.8 (25 Jan 2025 04:30), Max: 98.8 (24 Jan 2025 14:42)  HR: 85 (25 Jan 2025 04:30) (82 - 99)  BP: 107/56 (25 Jan 2025 04:30) (95/46 - 114/66)  BP(mean): 73 (24 Jan 2025 23:30) (61 - 73)  RR: 18 (25 Jan 2025 04:30) (18 - 18)  SpO2: 95% (25 Jan 2025 04:30) (95% - 99%)    Parameters below as of 25 Jan 2025 04:30  Patient On (Oxygen Delivery Method): room air        -----------------------------    PHYSICAL EXAMINATION:  GENERAL: NAD, well built  HEAD:  Atraumatic, Normocephalic  EYES:  conjunctiva and sclera clear  NECK: Supple, No JVD  CHEST/LUNG: Clear to auscultation bilaterally; No rales, rhonchi, wheezing, or rubs  HEART: Regular rate and rhythm; No murmurs, rubs, or gallops  ABDOMEN: Soft, Nontender, Nondistended; Bowel sounds present, no pain or masses on palpation  NERVOUS SYSTEM:  Alert & Oriented X3  : voiding well  EXTREMITIES:  2+ Peripheral Pulses, No clubbing, cyanosis, or edema  SKIN: warm dry                          9.9    11.37 )-----------( 319      ( 24 Jan 2025 05:40 )             28.7     01-24    138  |  103  |  33[H]  ----------------------------<  150[H]  4.2   |  26  |  1.17    Ca    8.4      24 Jan 2025 05:40  Phos  3.9     01-24  Mg     2.3     01-24    TPro  7.2  /  Alb  2.7[L]  /  TBili  0.7  /  DBili  x   /  AST  51[H]  /  ALT  35  /  AlkPhos  77  01-24    LIVER FUNCTIONS - ( 24 Jan 2025 05:40 )  Alb: 2.7 g/dL / Pro: 7.2 g/dL / ALK PHOS: 77 U/L / ALT: 35 U/L DA / AST: 51 U/L / GGT: x               PT/INR - ( 23 Jan 2025 10:36 )   PT: 13.4 sec;   INR: 1.16 ratio         PTT - ( 23 Jan 2025 10:36 )  PTT:31.1 sec    I&O's Summary    24 Jan 2025 07:01  -  25 Jan 2025 07:00  --------------------------------------------------------  IN: 0 mL / OUT: 275 mL / NET: -275 mL          Urinalysis with Rflx Culture (collected 23 Jan 2025 14:12)    Culture - Urine (collected 23 Jan 2025 14:12)  Source: Clean Catch  Final Report (24 Jan 2025 23:10):    >=3 organisms. Probable collection contamination.    Culture - Blood (collected 23 Jan 2025 10:36)  Source: .Blood BLOOD  Preliminary Report (24 Jan 2025 20:01):    No growth at 24 hours    Culture - Blood (collected 23 Jan 2025 10:30)  Source: .Blood BLOOD  Preliminary Report (24 Jan 2025 20:01):    No growth at 24 hours        CAPILLARY BLOOD GLUCOSE      RADIOLOGY & ADDITIONAL TESTS:                   PGY-1 Progress Note discussed with attending    PAGER #: [[phone]] TILL 5:00 PM  PLEASE CONTACT ON CALL TEAM:  - On Call Team (Please refer to Makenna) FROM 5:00 PM - 8:30PM  - Nightfloat Team FROM 8:30 -7:30 AM      INTERVAL HPI/OVERNIGHT EVENTS: Overnight, patient had c/o leg pain given IV tylenol and toradol.     ---------------------------    REVIEW OF SYSTEMS:  CONSTITUTIONAL: No fever, weight loss, or fatigue  RESPIRATORY: No cough, wheezing, chills or hemoptysis; No shortness of breath  CARDIOVASCULAR: No chest pain, palpitations, dizziness, or leg swelling  GASTROINTESTINAL: No abdominal pain. No nausea, vomiting, or hematemesis; No diarrhea or constipation. No melena or hematochezia.  GENITOURINARY: No dysuria or hematuria, urinary frequency  NEUROLOGICAL: No headaches, memory loss, loss of strength, numbness, or tremors  SKIN: No itching, burning, rashes, or lesions     MEDICATIONS  (STANDING):  aspirin  chewable 81 milliGRAM(s) Oral daily  atorvastatin 40 milliGRAM(s) Oral at bedtime  buMETAnide Injectable 1 milliGRAM(s) IV Push two times a day  enoxaparin Injectable 40 milliGRAM(s) SubCutaneous every 24 hours  insulin glargine Injectable (LANTUS) 30 Unit(s) SubCutaneous at bedtime  insulin lispro (ADMELOG) corrective regimen sliding scale   SubCutaneous three times a day before meals  insulin lispro (ADMELOG) corrective regimen sliding scale   SubCutaneous at bedtime  insulin lispro Injectable (ADMELOG) 10 Unit(s) SubCutaneous three times a day before meals  metoprolol succinate ER 25 milliGRAM(s) Oral daily  sacubitril 24 mG/valsartan 26 mG 1 Tablet(s) Oral two times a day    MEDICATIONS  (PRN):  acetaminophen     Tablet .. 650 milliGRAM(s) Oral every 6 hours PRN Temp greater or equal to 38C (100.4F), Mild Pain (1 - 3)  ketorolac   Injectable 15 milliGRAM(s) IV Push daily PRN Moderate Pain (4 - 6)  melatonin 3 milliGRAM(s) Oral at bedtime PRN Insomnia  ondansetron Injectable 4 milliGRAM(s) IV Push every 8 hours PRN Nausea and/or Vomiting      Vital Signs Last 24 Hrs  T(C): 37.1 (25 Jan 2025 04:30), Max: 37.1 (24 Jan 2025 14:42)  T(F): 98.8 (25 Jan 2025 04:30), Max: 98.8 (24 Jan 2025 14:42)  HR: 85 (25 Jan 2025 04:30) (82 - 99)  BP: 107/56 (25 Jan 2025 04:30) (95/46 - 114/66)  BP(mean): 73 (24 Jan 2025 23:30) (61 - 73)  RR: 18 (25 Jan 2025 04:30) (18 - 18)  SpO2: 95% (25 Jan 2025 04:30) (95% - 99%)    Parameters below as of 25 Jan 2025 04:30  Patient On (Oxygen Delivery Method): room air        -----------------------------    PHYSICAL EXAMINATION:  GENERAL: NAD, well built  HEAD:  Atraumatic, Normocephalic  EYES:  conjunctiva and sclera clear  NECK: Supple, No JVD  CHEST/LUNG: Clear to auscultation bilaterally; No rales, rhonchi, wheezing, or rubs  HEART: Regular rate and rhythm; No murmurs, rubs, or gallops  ABDOMEN: Soft, Nontender, Nondistended; Bowel sounds present, no pain or masses on palpation  NERVOUS SYSTEM:  Alert & Oriented X3  : voiding well  EXTREMITIES:  2+ Peripheral Pulses, No clubbing, cyanosis, or edema  SKIN: warm dry                          9.9    11.37 )-----------( 319      ( 24 Jan 2025 05:40 )             28.7     01-24    138  |  103  |  33[H]  ----------------------------<  150[H]  4.2   |  26  |  1.17    Ca    8.4      24 Jan 2025 05:40  Phos  3.9     01-24  Mg     2.3     01-24    TPro  7.2  /  Alb  2.7[L]  /  TBili  0.7  /  DBili  x   /  AST  51[H]  /  ALT  35  /  AlkPhos  77  01-24    LIVER FUNCTIONS - ( 24 Jan 2025 05:40 )  Alb: 2.7 g/dL / Pro: 7.2 g/dL / ALK PHOS: 77 U/L / ALT: 35 U/L DA / AST: 51 U/L / GGT: x               PT/INR - ( 23 Jan 2025 10:36 )   PT: 13.4 sec;   INR: 1.16 ratio         PTT - ( 23 Jan 2025 10:36 )  PTT:31.1 sec    I&O's Summary    24 Jan 2025 07:01  -  25 Jan 2025 07:00  --------------------------------------------------------  IN: 0 mL / OUT: 275 mL / NET: -275 mL          Urinalysis with Rflx Culture (collected 23 Jan 2025 14:12)    Culture - Urine (collected 23 Jan 2025 14:12)  Source: Clean Catch  Final Report (24 Jan 2025 23:10):    >=3 organisms. Probable collection contamination.    Culture - Blood (collected 23 Jan 2025 10:36)  Source: .Blood BLOOD  Preliminary Report (24 Jan 2025 20:01):    No growth at 24 hours    Culture - Blood (collected 23 Jan 2025 10:30)  Source: .Blood BLOOD  Preliminary Report (24 Jan 2025 20:01):    No growth at 24 hours        CAPILLARY BLOOD GLUCOSE      RADIOLOGY & ADDITIONAL TESTS:                   PGY-1 Progress Note discussed with attending    PAGER #: [[phone]] TILL 5:00 PM  PLEASE CONTACT ON CALL TEAM:  - On Call Team (Please refer to Makenna) FROM 5:00 PM - 8:30PM  - Nightfloat Team FROM 8:30 -7:30 AM      INTERVAL HPI/OVERNIGHT EVENTS: Overnight, patient had c/o leg pain given IV tylenol and toradol. Still c/o of right leg pain but has no CP or SOB.    Brother Galo Kirby (works in ?admin at [employer]: [phone] or [phone]) and wanted to address 4 issues: he wanted to speak with cardiologist, medicine attending, address the wounds from the leg, and have someone treat her shooting pain. I explained I will relay all his concerns to my attending and explained that the wounds on the legs are likely due to this fluid overloaded state and not due to an infection and the reason it was healing slowly was because of her uncontrolled diabetes. Our management is to get the fluid off and to help control her diabetes so the wounds can heal faster. I addressed that the shooting pains are likely due to diabetic neuropathy because her diabetes have been so uncontrolled.    ---------------------------    REVIEW OF SYSTEMS:    MEDICATIONS  (STANDING):  aspirin  chewable 81 milliGRAM(s) Oral daily  atorvastatin 40 milliGRAM(s) Oral at bedtime  buMETAnide Injectable 1 milliGRAM(s) IV Push two times a day  enoxaparin Injectable 40 milliGRAM(s) SubCutaneous every 24 hours  insulin glargine Injectable (LANTUS) 30 Unit(s) SubCutaneous at bedtime  insulin lispro (ADMELOG) corrective regimen sliding scale   SubCutaneous three times a day before meals  insulin lispro (ADMELOG) corrective regimen sliding scale   SubCutaneous at bedtime  insulin lispro Injectable (ADMELOG) 10 Unit(s) SubCutaneous three times a day before meals  metoprolol succinate ER 25 milliGRAM(s) Oral daily  sacubitril 24 mG/valsartan 26 mG 1 Tablet(s) Oral two times a day    MEDICATIONS  (PRN):  acetaminophen     Tablet .. 650 milliGRAM(s) Oral every 6 hours PRN Temp greater or equal to 38C (100.4F), Mild Pain (1 - 3)  ketorolac   Injectable 15 milliGRAM(s) IV Push daily PRN Moderate Pain (4 - 6)  melatonin 3 milliGRAM(s) Oral at bedtime PRN Insomnia  ondansetron Injectable 4 milliGRAM(s) IV Push every 8 hours PRN Nausea and/or Vomiting      Vital Signs Last 24 Hrs  T(C): 37.1 (25 Jan 2025 04:30), Max: 37.1 (24 Jan 2025 14:42)  T(F): 98.8 (25 Jan 2025 04:30), Max: 98.8 (24 Jan 2025 14:42)  HR: 85 (25 Jan 2025 04:30) (82 - 99)  BP: 107/56 (25 Jan 2025 04:30) (95/46 - 114/66)  BP(mean): 73 (24 Jan 2025 23:30) (61 - 73)  RR: 18 (25 Jan 2025 04:30) (18 - 18)  SpO2: 95% (25 Jan 2025 04:30) (95% - 99%)    Parameters below as of 25 Jan 2025 04:30  Patient On (Oxygen Delivery Method): room air        -----------------------------    PHYSICAL EXAMINATION:  GENERAL: NAD, well built  NERVOUS SYSTEM:  Alert & Oriented X3  EXTREMITIES: Minimal erythema b/l, 3 large ulcers on right leg with no drainage or bleeding, scattered small fluid-filled bullae on right leg; 1 dried ulcer on left leg, dryness of ankle; 3+ edema b/l  SKIN: warm                          9.9    11.37 )-----------( 319      ( 24 Jan 2025 05:40 )             28.7     01-24    138  |  103  |  33[H]  ----------------------------<  150[H]  4.2   |  26  |  1.17    Ca    8.4      24 Jan 2025 05:40  Phos  3.9     01-24  Mg     2.3     01-24    TPro  7.2  /  Alb  2.7[L]  /  TBili  0.7  /  DBili  x   /  AST  51[H]  /  ALT  35  /  AlkPhos  77  01-24    LIVER FUNCTIONS - ( 24 Jan 2025 05:40 )  Alb: 2.7 g/dL / Pro: 7.2 g/dL / ALK PHOS: 77 U/L / ALT: 35 U/L DA / AST: 51 U/L / GGT: x               PT/INR - ( 23 Jan 2025 10:36 )   PT: 13.4 sec;   INR: 1.16 ratio         PTT - ( 23 Jan 2025 10:36 )  PTT:31.1 sec    I&O's Summary    24 Jan 2025 07:01  -  25 Jan 2025 07:00  --------------------------------------------------------  IN: 0 mL / OUT: 275 mL / NET: -275 mL          Urinalysis with Rflx Culture (collected 23 Jan 2025 14:12)    Culture - Urine (collected 23 Jan 2025 14:12)  Source: Clean Catch  Final Report (24 Jan 2025 23:10):    >=3 organisms. Probable collection contamination.    Culture - Blood (collected 23 Jan 2025 10:36)  Source: .Blood BLOOD  Preliminary Report (24 Jan 2025 20:01):    No growth at 24 hours    Culture - Blood (collected 23 Jan 2025 10:30)  Source: .Blood BLOOD  Preliminary Report (24 Jan 2025 20:01):    No growth at 24 hours        CAPILLARY BLOOD GLUCOSE      RADIOLOGY & ADDITIONAL TESTS:

## 2025-01-25 NOTE — CONSULT NOTE ADULT - SUBJECTIVE AND OBJECTIVE BOX
Source of information: TOMASZ ALBA, Chart review  Patient language: English  : n/a    HPI:  64F, ambulates independently at baseline, hx of CHF (last TTE on 1/16/25 EF 30-35%, G2DD), DM, diabetic foot ulcer with a recent admission at [facility] for CHF exacerbation 1/14-1/17, p/w worsening right leg swelling and pain. As per patient, from last admission, patient has noticed that right leg has continued to "secrete" fluids soaking and requiring drying every 2-3 hours. There is increased pain for which patient took Tylenol with no relief and she is unable to ambulate now. She endorses improvement in her left leg and can bear weight on that with no issues. She denies having any history of fever, redness, hot to touch of the right leg since her previous admission. Patient endorses her home medication of furosemide 40 daily is not helping and describes her urine output to be "decreased" and prefers the IV lasix. She continues to endorse orthopnea and shortness of breath w/ exertion. Inquiry about if swelling has progressed to her stomach, she denies saying it is mid-thigh bilaterally only. When inquired about her home CHF medications, she endorses that the Entresto has not been approved by insurance yet so she is currently not taking it.  (23 Jan 2025 16:21)    Patient is a 64y old  Female who presents with a chief complaint of R. Leg pain and swelling (25 Jan 2025 07:37)    Right foot x-ray demonstrated - Wound at the dorsal forefoot evident on the lateral view  Mild to moderate circumferential subcutaneous soft tissue edema with no   gas or gross cortical destruction.    Pt is admitted for right leg pain with blisters that "popped" and she was evaluated by a podiatrist prior to hospital admission. Pain service consulted for management of  RLE pain. Pt seen and examined at bedside. Reports pain score 5/10 SCALE USED: (1-10 VNRS). Pt describes pain as aching and occasionally burning.  Pt states pain was worse in the morning but she relaxed after RN changed her dressing and provided pain medication.  Pain is localized in the right foot and is exacerbated by applying pressure on the affected extremity and movements.   Pt tolerating PO diet. Denies lethargy, chest pain, SOB, nausea, vomiting, constipation. Reports last BM 1/24. Patient stated goal for pain control: to be able to take deep breaths, get out of bed to chair and ambulate with tolerable pain control. Pt denies taking medications for pain at home.     PAST MEDICAL & SURGICAL HISTORY:    DM (diabetes mellitus)    Diabetic foot ulcer    Congestive heart failure (CHF)    CAD (coronary artery disease)    Cataract of both eyes, unspecified cataract type    History of cholecystectomy    FAMILY HISTORY:  Family history of CHF (congestive heart failure) (Mother)    Social History:  [X] Denies ETOH use, illicit drug use and smoking    Allergies  No Known Allergies    MEDICATIONS  (STANDING):  aspirin  chewable 81 milliGRAM(s) Oral daily  atorvastatin 40 milliGRAM(s) Oral at bedtime  buMETAnide Injectable 1 milliGRAM(s) IV Push two times a day  enoxaparin Injectable 40 milliGRAM(s) SubCutaneous every 24 hours  insulin glargine Injectable (LANTUS) 25 Unit(s) SubCutaneous at bedtime  insulin lispro (ADMELOG) corrective regimen sliding scale   SubCutaneous three times a day before meals  insulin lispro (ADMELOG) corrective regimen sliding scale   SubCutaneous at bedtime  metoprolol succinate ER 25 milliGRAM(s) Oral daily  sacubitril 24 mG/valsartan 26 mG 1 Tablet(s) Oral two times a day    MEDICATIONS  (PRN):  acetaminophen     Tablet .. 650 milliGRAM(s) Oral every 6 hours PRN Temp greater or equal to 38C (100.4F), Mild Pain (1 - 3)  melatonin 3 milliGRAM(s) Oral at bedtime PRN Insomnia  ondansetron Injectable 4 milliGRAM(s) IV Push every 8 hours PRN Nausea and/or Vomiting  oxyCODONE    IR 5 milliGRAM(s) Oral every 6 hours PRN Severe Pain (7 - 10)  oxyCODONE    IR 2.5 milliGRAM(s) Oral every 6 hours PRN Moderate Pain (4 - 6)    Vital Signs Last 24 Hrs  T(C): 37 (25 Jan 2025 11:09), Max: 37.1 (24 Jan 2025 14:42)  T(F): 98.6 (25 Jan 2025 11:09), Max: 98.8 (24 Jan 2025 14:42)  HR: 99 (25 Jan 2025 11:09) (85 - 99)  BP: 106/80 (25 Jan 2025 11:09) (95/46 - 118/63)  BP(mean): 73 (24 Jan 2025 23:30) (61 - 73)  RR: 18 (25 Jan 2025 11:09) (18 - 19)  SpO2: 97% (25 Jan 2025 11:09) (95% - 99%)    Parameters below as of 25 Jan 2025 11:09  Patient On (Oxygen Delivery Method): room air    LABS: Reviewed.                        9.7    14.26 )-----------( 345      ( 25 Jan 2025 06:57 )             27.9     01-25    138  |  103  |  41[H]  ----------------------------<  87  3.8   |  27  |  1.25    Ca    8.7      25 Jan 2025 06:57  Phos  3.9     01-24  Mg     2.3     01-24    TPro  7.2  /  Alb  2.7[L]  /  TBili  0.7  /  DBili  x   /  AST  51[H]  /  ALT  35  /  AlkPhos  77  01-24      LIVER FUNCTIONS - ( 24 Jan 2025 05:40 )  Alb: 2.7 g/dL / Pro: 7.2 g/dL / ALK PHOS: 77 U/L / ALT: 35 U/L DA / AST: 51 U/L / GGT: x           Urinalysis Basic - ( 25 Jan 2025 06:57 )    Color: x / Appearance: x / SG: x / pH: x  Gluc: 87 mg/dL / Ketone: x  / Bili: x / Urobili: x   Blood: x / Protein: x / Nitrite: x   Leuk Esterase: x / RBC: x / WBC x   Sq Epi: x / Non Sq Epi: x / Bacteria: x    POCT Blood Glucose.: 69 mg/dL (25 Jan 2025 11:26)  POCT Blood Glucose.: 91 mg/dL (25 Jan 2025 07:55)  POCT Blood Glucose.: 188 mg/dL (24 Jan 2025 22:32)  POCT Blood Glucose.: 185 mg/dL (24 Jan 2025 21:00)  POCT Blood Glucose.: 87 mg/dL (24 Jan 2025 16:55)  POCT Blood Glucose.: 92 mg/dL (24 Jan 2025 12:56)    Radiology: Reviewed.   < from: Xray Foot AP + Lateral + Oblique, Right (01.23.25 @ 12:17) >    ACC: [id number] EXAM:  XR FOOT COMP MIN 3 VIEWS RT   ORDERED BY: CHERI ROBERTS     ACC: [id number] EXAM:  XR CHEST PA LAT 2V   ORDERED BY: CHERI ROBERTS     ACC: [id number] EXAM:  XR TIB FIB AP LAT 2 VIEWS RT   ORDERED BY: CHERI ROBERTS     PROCEDURE DATE:  01/23/2025      INTERPRETATION:  Clinical history: 64-year-old female Right foot wound x1   week. Tibial wound.    Four views of the right leg, 3 views of the right foot. Two   expiratory/kyphotic views of the chest are compared to 1/14/2024.    FINDINGS:MSK: Mild degenerative change with no fracture, dislocation,   gross cortical destruction or soft tissue emphysema.    Mild to moderate circumferential subcutaneous soft tissue edema.   Cutaneous wound at the dorsal forefoot    Chest: Normal cardiac silhouette and normal pulmonary vasculature with no   lobar consolidation, pneumothorax.    Small bilateral pleural effusions, improved.    IMPRESSION:    Wound at the dorsal forefoot evident on the lateral view    Mild to moderate circumferential subcutaneous soft tissue edema with no   gas or gross cortical destruction. If there is continued clinical concern   follow-up MRI can be ordered    --- End of Report --      EUSEBIO OQUENDO DO; Attending Radiologist  This document has been electronically signed. Jan 23 2025  2:45PM    < end of copied text >    ORT Score -   Family Hx of substance abuse	Female	      Male  Alcohol 	                                           1                     3  Illegal drugs	                                   2                     3  Rx drugs                                           4 	                  4  Personal Hx of substance abuse		  Alcohol 	                                          3	                  3  Illegal drugs                                     4	                  4  Rx drugs                                            5 	                  5  Age between 16- 45 years	           1                     1  hx preadolescent sexual abuse	   3 	                  0  Psychological disease		  ADD, OCD, bipolar, schizophrenia   2	          2  Depression                                           1 	          1  Total: 0    a score of 3 or lower indicates low risk for opioid abuse		  a score of 4-7 indicates moderate risk for opioid abuse		  a score of 8 or higher indicates high risk for opioid abuse      REVIEW OF SYSTEMS:  CONSTITUTIONAL: No fever or fatigue  HEENT:  No difficulty hearing, no change in vision  NECK: No pain or stiffness  RESPIRATORY: No cough, wheezing, chills or hemoptysis; + shortness of breath on exertion  CARDIOVASCULAR: No chest pain, palpitations, dizziness, or leg swelling  GASTROINTESTINAL: No loss of appetite, decreased PO intake. No abdominal or epigastric pain. No nausea, vomiting; No diarrhea or constipation.   GENITOURINARY: No dysuria, frequency, hematuria, retention or incontinence  MUSCULOSKELETAL: + Right LE pain and swelling; No  back pain, no upper or lower extremity motor strength weakness, no saddle anesthesia, bowel/bladder incontinence, no falls   NEURO: No headaches, No numbness/tingling b/l LE, No weakness  ENDOCRINE: No polyuria, polydipsia, heat or cold intolerance; No hair loss  PSYCHIATRIC: No depression, anxiety or difficulty sleeping    PHYSICAL EXAM:  GENERAL:  Alert & Oriented X4, cooperative, NAD, Good concentration. Speech is clear.   RESPIRATORY: Respirations even and unlabored. Clear to auscultation bilaterally; No rales, rhonchi, wheezing, or rubs  CARDIOVASCULAR: Normal S1/S2, regular rate and rhythm; No murmurs, rubs, or gallops.   GASTROINTESTINAL:  Soft, Nontender, Nondistended; Bowel sounds present  PERIPHERAL VASCULAR:  Extremities warm, + thickened toenails,  Unable to assess pulses due to dressings, No cyanosis, No calf tenderness  MUSCULOSKELETAL: Motor Strength 5/5 B/L upper and 4/5 lower extremities; moves all extremities equally against gravity; ROM intact but guarded RLE movement due to pain.  SKIN: Warm, dry, intact. ace-wrap on BLE's intact    Risk factors associated with adverse outcomes related to opioid treatment  [ ]  Concurrent benzodiazepine use  [ ]  History/ Active substance use or alcohol use disorder  [ ] Psychiatric co-morbidity  [ ] Sleep apnea  [ ] COPD  [ ] BMI> 35  [ ] Liver dysfunction  [ ] Renal dysfunction  [ ] CHF  [ ] Smoker  [X]  Age > 60 years    [X]  [place]  Reviewed and Copied to Chart. See below.    Plan of care and goal oriented pain management treatment options were discussed with patient and /or primary care giver; all questions and concerns were addressed and care was aligned with patient's wishes.    Educated patient on goal oriented pain management treatment options

## 2025-01-25 NOTE — PROGRESS NOTE ADULT - PROBLEM SELECTOR PLAN 2
Hx of CHF, previous admission in January, on home Lasix 40 qD, Metoprolol Succ 25 qD. Not on Entresto due to insurance issues  Last TTE: LVSF moderately decreased w/ EF 30-35 %. Moderate G2DD. Mild MR  Cardio consulted, Dr. Gilmore rec ischemic eval, pt now agreeable for stress test  Thoroughly discussed plan with pt and brother at bedside    -Admit to tele + q4 VS  -Strict I/Os  -Fluid Restrict 1.5 L  -Daily Weights  -IV Bumex 1 BID  -c/w Metoprolol Succ 25 qD  -Restarted entresto 24/26 BID  -f/u Cardio recs Hx of CHF, previous admission in January, on home Lasix 40 qD, Metoprolol Succ 25 qD. Not on Entresto due to insurance issues  Last TTE: LVSF moderately decreased w/ EF 30-35 %. Moderate G2DD. Mild MR  Cardio consulted, Dr. Gilmore rec ischemic eval, pt now agreeable for stress test  Thoroughly discussed plan with pt and brother at bedside  Stress test: small-sized, moderate defect(s) in the apical wall that is predominantly fixed suggestive of an infarction with minimal marcus-infarct ischemia    -Admit to tele + q4 VS  -Strict I/Os  -Fluid Restrict 1.5 L  -Daily Weights  -IV Bumex 1 BID (can transition to PO 1/26)  -c/w Metoprolol Succ 25 qD  -Restarted entresto 24/26 BID  -f/u Cardio recs

## 2025-01-25 NOTE — CONSULT NOTE ADULT - PROBLEM SELECTOR RECOMMENDATION 9
Pt with RLE pain which is somatic and neuropathic in nature due to blisters that popped.  Pt also with HBA1c 11.6 on admission. High risk medications reviewed. Avoid polypharmacy. Avoid IV opioids. Avoid NSAIDs and benzodiazepines. Non-pharmacological sleep aides initiated. Non-opioid medications and non-pharmacological pain management measures initiated.   Recommend Endocrine and nutrition consult.  Opioid pain recommendations   - Oxycodone 2.5 mg/5 mg PO q 6 hours PRN moderate/severe pain. Monitor for sedation/ respiratory depression.   Non-opioid pain recommendations   - Acetaminophen 1 gram PO q 12 hours for 3 days. Monitor LFTs  - Gabapentin is not recommended in pts with CHF.   Bowel Regimen  - Continue Miralax 17G PO daily  - Continue Senna 2 tablets at bedtime for constipation  Mild pain 1 - 3  - Non-pharmacological pain treatment recommendations  - Warm/ Cool packs PRN   - Repositioning guided imagery, relaxation, distraction.  - Physical therapy OOB if no contraindications   Recommendations discussed with primary team and RN

## 2025-01-25 NOTE — PROGRESS NOTE ADULT - ASSESSMENT
64F, hx of CHF (last TTE on 1/16/25 EF 30-35%, G2DD), DM, diabetic foot ulcer with a recent admission at [facility] for CHF exacerbation 1/14-1/17 p/w worsening R. leg pain and fluid secretion, was meeting sepsis criteria with podiatry having low suspicion for right cellulitis and admitted for continued management of CHF exacerbation and needing ischemic eval.

## 2025-01-25 NOTE — PROGRESS NOTE ADULT - SUBJECTIVE AND OBJECTIVE BOX
PATIENT SEEN AND EXAMINED BY KHAI REEVES M.D. ON :- 1/25/25  DATE OF SERVICE:     1/25/25        Interim events noted,Labs ,Radiological studies and Cardiology tests reviewed .    Patient is a 64y old  Female who presents with a chief complaint of R. Leg pain and swelling (25 Jan 2025 12:43)      HPI:  64F, ambulates independently at baseline, hx of CHF (last TTE on 1/16/25 EF 30-35%, G2DD), DM, diabetic foot ulcer with a recent admission at [facility] for CHF exacerbation 1/14-1/17, p/w worsening right leg swelling and pain. As per patient, from last admission, patient has noticed that right leg has continued to "secrete" fluids soaking and requiring drying every 2-3 hours. There is increased pain for which patient took Tylenol with no relief and she is unable to ambulate now. She endorses improvement in her left leg and can bear weight on that with no issues. She denies having any history of fever, redness, hot to touch of the right leg since her previous admission. Patient endorses her home medication of furosemide 40 daily is not helping and describes her urine output to be "decreased" and prefers the IV lasix. She continues to endorse orthopnea and shortness of breath w/ exertion. Inquiry about if swelling has progressed to her stomach, she denies saying it is mid-thigh bilaterally only. When inquired about her home CHF medications, she endorses that the Entresto has not been approved by insurance yet so she is currently not taking it.  (23 Jan 2025 16:21)      PAST MEDICAL & SURGICAL HISTORY:  DM (diabetes mellitus)      Diabetic foot ulcer      Congestive heart failure (CHF)      CAD (coronary artery disease)      Cataract of both eyes, unspecified cataract type      History of cholecystectomy          PREVIOUS DIAGNOSTIC TESTING:      ECHO  FINDINGS:    STRESS  FINDINGS:    CATHETERIZATION  FINDINGS:    MEDICATIONS  (STANDING):  acetaminophen     Tablet .. 1000 milliGRAM(s) Oral every 12 hours  ampicillin/sulbactam  IVPB      aspirin  chewable 81 milliGRAM(s) Oral daily  atorvastatin 40 milliGRAM(s) Oral at bedtime  enoxaparin Injectable 40 milliGRAM(s) SubCutaneous every 24 hours  gabapentin 100 milliGRAM(s) Oral at bedtime  insulin glargine Injectable (LANTUS) 25 Unit(s) SubCutaneous at bedtime  insulin lispro (ADMELOG) corrective regimen sliding scale   SubCutaneous three times a day before meals  insulin lispro (ADMELOG) corrective regimen sliding scale   SubCutaneous at bedtime  metoprolol succinate ER 25 milliGRAM(s) Oral daily  sacubitril 24 mG/valsartan 26 mG 1 Tablet(s) Oral two times a day    MEDICATIONS  (PRN):  melatonin 3 milliGRAM(s) Oral at bedtime PRN Insomnia  ondansetron Injectable 4 milliGRAM(s) IV Push every 8 hours PRN Nausea and/or Vomiting  oxyCODONE    IR 2.5 milliGRAM(s) Oral every 6 hours PRN Moderate Pain (4 - 6)  oxyCODONE    IR 5 milliGRAM(s) Oral every 6 hours PRN Severe Pain (7 - 10)      FAMILY HISTORY:  Family history of CHF (congestive heart failure) (Mother)        SOCIAL HISTORY:    CIGARETTES:    ALCOHOL:    REVIEW OF SYSTEMS:  CONSTITUTIONAL: No fever, weight loss, or fatigue  EYES: No eye pain, visual disturbances, or discharge  ENMT:  No difficulty hearing, tinnitus, vertigo; No sinus or throat pain  NECK: No pain or stiffness  RESPIRATORY: No cough, wheezing, chills or hemoptysis; No shortness of breath  CARDIOVASCULAR: No chest pain, palpitations, dizziness, or leg swelling  GASTROINTESTINAL: No abdominal or epigastric pain. No nausea, vomiting, or hematemesis; No diarrhea or constipation. No melena or hematochezia.  GENITOURINARY: No dysuria, frequency, hematuria, or incontinence  NEUROLOGICAL: No headaches, memory loss, loss of strength, numbness, or tremors  SKIN: No itching, burning, rashes, or lesions   LYMPH NODES: No enlarged glands  ENDOCRINE: No heat or cold intolerance; No hair loss  MUSCULOSKELETAL: No joint pain or swelling; No muscle, back, or extremity pain  PSYCHIATRIC: No depression, anxiety, mood swings, or difficulty sleeping  HEME/LYMPH: No easy bruising, or bleeding gums  ALLERY AND IMMUNOLOGIC: No hives or eczema    Vital Signs Last 24 Hrs  T(C): 37.2 (25 Jan 2025 18:44), Max: 39.1 (25 Jan 2025 16:08)  T(F): 99 (25 Jan 2025 18:44), Max: 102.4 (25 Jan 2025 16:08)  HR: 85 (25 Jan 2025 18:44) (85 - 99)  BP: 95/51 (25 Jan 2025 18:44) (95/51 - 118/63)  BP(mean): 64 (25 Jan 2025 18:44) (64 - 73)  RR: 18 (25 Jan 2025 16:08) (18 - 19)  SpO2: 97% (25 Jan 2025 16:08) (95% - 99%)    Parameters below as of 25 Jan 2025 16:08  Patient On (Oxygen Delivery Method): room air          PHYSICAL EXAM:  GENERAL: NAD, well-groomed, well-developed  HEAD:  Atraumatic, Normocephalic  EYES: EOMI, PERRLA, conjunctiva and sclera clear  ENMT: No tonsillar erythema, exudates, or enlargement; Moist mucous membranes, Good dentition, No lesions  NECK: Supple, No JVD, Normal thyroid  NERVOUS SYSTEM:  Alert & Oriented X3, Good concentration; Motor Strength 5/5 B/L upper and lower extremities; DTRs 2+ intact and symmetric  CHEST/LUNG: Clear to percussion bilaterally; No rales, rhonchi, wheezing, or rubs  HEART: Regular rate and rhythm; No murmurs, rubs, or gallops  ABDOMEN: Soft, Nontender, Nondistended; Bowel sounds present  EXTREMITIES:  2+ Peripheral Pulses, No clubbing, cyanosis, or edema  LYMPH: No lymphadenopathy noted  SKIN: No rashes or lesions      INTERPRETATION OF TELEMETRY:    ECG:    LILLIEDSVASC:     LABS:                        9.9    13.81 )-----------( 360      ( 25 Jan 2025 17:44 )             28.6     01-25    138  |  101  |  43[H]  ----------------------------<  112[H]  4.5   |  27  |  1.25    Ca    8.6      25 Jan 2025 17:44  Phos  3.5     01-25  Mg     2.1     01-25    TPro  7.4  /  Alb  2.7[L]  /  TBili  0.6  /  DBili  x   /  AST  30  /  ALT  36  /  AlkPhos  73  01-25          Urinalysis Basic - ( 25 Jan 2025 17:44 )    Color: x / Appearance: x / SG: x / pH: x  Gluc: 112 mg/dL / Ketone: x  / Bili: x / Urobili: x   Blood: x / Protein: x / Nitrite: x   Leuk Esterase: x / RBC: x / WBC x   Sq Epi: x / Non Sq Epi: x / Bacteria: x      Lipid Panel:   I&O's Summary    24 Jan 2025 07:01  -  25 Jan 2025 07:00  --------------------------------------------------------  IN: 0 mL / OUT: 275 mL / NET: -275 mL    25 Jan 2025 07:01  -  25 Jan 2025 22:49  --------------------------------------------------------  IN: 425 mL / OUT: 850 mL / NET: -425 mL        RADIOLOGY & ADDITIONAL STUDIES:    < from: TTE W or WO Ultrasound Enhancing Agent (01.16.25 @ 12:45) >  CONCLUSIONS:      1. Left ventricular systolic function is moderately decreased with an ejection fraction visually estimated at 30 to 35 %.   2. There is moderate (grade 2) left ventricular diastolic dysfunction.   3. Mild mitral regurgitation.   4. Trace tricuspid regurgitation.   5. Trace pulmonic regurgitation.   6. Trace pericardial effusion.   7. Right pleural effusion noted.   8. No prior echocardiogram is available for comparison.    < end of copied text >

## 2025-01-25 NOTE — CHART NOTE - NSCHARTNOTEFT_GEN_A_CORE
Informed by NEVAEH Mackay that patient is febrile to 102.4. Recent CT chest without signs of PNA. Patient fulfills SIRS criteria (WBC 14k, T 102.4F). Will start CTX 1g qd for possible sepsis likely 2/2 cellulitis. Recent cultures with NGTD, holding off on repeating cultures at this time. Patient with HFrEF, will not give IV fluids. Will F/U CBC, CMP, Mg, Phos, Lactate. Attending Dr. Alexander made aware. Informed by NEVAEH Mackay that patient is febrile to 102.4. Recent CT chest without signs of PNA. Patient fulfills SIRS criteria (WBC 14k, T 102.4F). Will start CTX 1g qd for possible sepsis likely 2/2 cellulitis. Recent cultures with NGTD, holding off on repeating cultures at this time. Patient with HFrEF, will not give IV fluids. Will F/U CBC, CMP, Mg, Phos, Lactate. Attending Dr. Alexander made aware.    Discussed with ID, will initiate unasyn. Holding blood cultures for now- she had fever on admission and blood cultures negative to date. Likely source skin and soft tissue infection lower ext. Flu/coivd negative, andrew bdominal symptoms, ct chest noted- atelectasis, pleural effusion and small pericardial effusion

## 2025-01-25 NOTE — PROGRESS NOTE ADULT - ASSESSMENT
64F, hx of CHF (last TTE on 1/16/25 EF 30-35%, G2DD), DM, diabetic foot ulcer with a recent admission at [facility] for CHF exacerbation 1/14-1/17 p/w worsening R. leg pain and fluid secretion, was meeting sepsis criteria and admitted for continued management of CHF exacerbation      # SIRS (systemic inflammatory response syndrome).   # HF   # CAD  abx per Primary team   Lasix 40 qD, Metoprolol Succ 25 qD. Not on Entresto due to insurance issues  -c/w Metoprolol Succ 25 qD  -Resume Entresto 24/26 BID  -aspirin 81 qD  -Atorvastatin 40 qD

## 2025-01-25 NOTE — CONSULT NOTE ADULT - ASSESSMENT
Search Terms: TOMASZ ALBA, 1960    Search Date: 01/25/2025 12:43:22 PM  The Drug Utilization Report below displays all of the controlled substance prescriptions, if any, that your patient has filled in the last twelve months. The information displayed on this report is compiled from pharmacy submissions to the Department, and accurately reflects the information as submitted by the pharmacies.    This report was requested by: Beth Anderson | Reference #: [id number]

## 2025-01-25 NOTE — PROGRESS NOTE ADULT - PROBLEM SELECTOR PLAN 4
Podiatry following, b/l serous bullae, no concerns for infection  Consulted pain management  Will give morphine 2mg x 1 prior to stress test Podiatry following, b/l serous bullae, no concerns for infection  Consulted pain management  s/p morphine 2mg x 1 prior to stress test    Pain recs:  -Oxycodone 2.5 mg/5 mg PO q 6 hours PRN moderate/severe pain.   -Acetaminophen 1 gram PO q 12 hours for 3 days. Monitor LFTs  -Gabapentin is not recommended in pts with CHF. Podiatry following, b/l serous bullae, no concerns for infection  Consulted pain management  s/p morphine 2mg x 1 prior to stress test    Pain recs:  -Oxycodone 2.5 mg/5 mg PO q 6 hours PRN moderate/severe pain.   -Acetaminophen 1 gram PO q 12 hours for 3 days. Monitor LFTs  - Appreciate pain recs, gabapentin with remote risk to increase risk of chf. WIll start gabapentin 100mg at bedtime

## 2025-01-25 NOTE — PROGRESS NOTE ADULT - PROBLEM SELECTOR PLAN 1
Had fever 101.5 on admission  patient denying any recent history of fever  UA negative  COVID/viral panel negative  Abdomen without pain, soft nontender  discussed with podiatry lower extremity and reviewed photos- lower extremity appears improved compared to prior  Xray with bilateral effusions- check CT chest    -Continue monitoring wbc and fevers- if recurs, start [place] spectrum  -No source of infection thus far, hold antibiotics  -f/u CT chest Had fever 101.5 on admission  patient denying any recent history of fever  UA negative  COVID/viral panel negative  Abdomen without pain, soft nontender  discussed with podiatry lower extremity and reviewed photos- lower extremity appears improved compared to prior  Xray with bilateral effusions- check CT chest    -Continue monitoring wbc and fevers- if recurs, start [place] spectrum  -No source of infection thus far, hold antibiotics  -f/u CT chest official read

## 2025-01-26 LAB
GLUCOSE BLDC GLUCOMTR-MCNC: 123 MG/DL — HIGH (ref 70–99)
GLUCOSE BLDC GLUCOMTR-MCNC: 153 MG/DL — HIGH (ref 70–99)
GLUCOSE BLDC GLUCOMTR-MCNC: 157 MG/DL — HIGH (ref 70–99)
GLUCOSE BLDC GLUCOMTR-MCNC: 87 MG/DL — SIGNIFICANT CHANGE UP (ref 70–99)

## 2025-01-26 PROCEDURE — 99222 1ST HOSP IP/OBS MODERATE 55: CPT

## 2025-01-26 PROCEDURE — 99233 SBSQ HOSP IP/OBS HIGH 50: CPT

## 2025-01-26 PROCEDURE — G0545: CPT

## 2025-01-26 RX ORDER — INSULIN GLARGINE-YFGN 100 [IU]/ML
20 INJECTION, SOLUTION SUBCUTANEOUS AT BEDTIME
Refills: 0 | Status: DISCONTINUED | OUTPATIENT
Start: 2025-01-26 | End: 2025-01-29

## 2025-01-26 RX ORDER — OXYCODONE HYDROCHLORIDE 30 MG/1
2.5 TABLET ORAL EVERY 4 HOURS
Refills: 0 | Status: DISCONTINUED | OUTPATIENT
Start: 2025-01-26 | End: 2025-01-27

## 2025-01-26 RX ORDER — OXYCODONE HYDROCHLORIDE 30 MG/1
5 TABLET ORAL EVERY 4 HOURS
Refills: 0 | Status: DISCONTINUED | OUTPATIENT
Start: 2025-01-26 | End: 2025-01-28

## 2025-01-26 RX ADMIN — ASPIRIN 81 MILLIGRAM(S): 81 TABLET, COATED ORAL at 12:36

## 2025-01-26 RX ADMIN — OXYCODONE HYDROCHLORIDE 5 MILLIGRAM(S): 30 TABLET ORAL at 16:35

## 2025-01-26 RX ADMIN — BUMETANIDE 1 MILLIGRAM(S): 2 TABLET ORAL at 05:38

## 2025-01-26 RX ADMIN — SACUBITRIL AND VALSARTAN 1 TABLET(S): 49; 51 TABLET, FILM COATED ORAL at 18:53

## 2025-01-26 RX ADMIN — ACETAMINOPHEN 1000 MILLIGRAM(S): 160 SUSPENSION ORAL at 06:37

## 2025-01-26 RX ADMIN — INSULIN GLARGINE-YFGN 20 UNIT(S): 100 INJECTION, SOLUTION SUBCUTANEOUS at 21:24

## 2025-01-26 RX ADMIN — ACETAMINOPHEN 1000 MILLIGRAM(S): 160 SUSPENSION ORAL at 18:53

## 2025-01-26 RX ADMIN — ACETAMINOPHEN 1000 MILLIGRAM(S): 160 SUSPENSION ORAL at 05:37

## 2025-01-26 RX ADMIN — AMPICILLIN SODIUM AND SULBACTAM SODIUM 200 GRAM(S): 2; 1 INJECTION, POWDER, FOR SOLUTION INTRAMUSCULAR; INTRAVENOUS at 05:38

## 2025-01-26 RX ADMIN — AMPICILLIN SODIUM AND SULBACTAM SODIUM 200 GRAM(S): 2; 1 INJECTION, POWDER, FOR SOLUTION INTRAMUSCULAR; INTRAVENOUS at 12:36

## 2025-01-26 RX ADMIN — OXYCODONE HYDROCHLORIDE 5 MILLIGRAM(S): 30 TABLET ORAL at 16:05

## 2025-01-26 RX ADMIN — AMPICILLIN SODIUM AND SULBACTAM SODIUM 200 GRAM(S): 2; 1 INJECTION, POWDER, FOR SOLUTION INTRAMUSCULAR; INTRAVENOUS at 18:53

## 2025-01-26 RX ADMIN — ATORVASTATIN CALCIUM 40 MILLIGRAM(S): 80 TABLET, FILM COATED ORAL at 21:25

## 2025-01-26 RX ADMIN — BUMETANIDE 1 MILLIGRAM(S): 2 TABLET ORAL at 18:53

## 2025-01-26 RX ADMIN — ACETAMINOPHEN 1000 MILLIGRAM(S): 160 SUSPENSION ORAL at 19:30

## 2025-01-26 RX ADMIN — AMPICILLIN SODIUM AND SULBACTAM SODIUM 200 GRAM(S): 2; 1 INJECTION, POWDER, FOR SOLUTION INTRAMUSCULAR; INTRAVENOUS at 00:01

## 2025-01-26 RX ADMIN — Medication 1: at 12:12

## 2025-01-26 RX ADMIN — GABAPENTIN 100 MILLIGRAM(S): 800 TABLET ORAL at 21:25

## 2025-01-26 NOTE — CONSULT NOTE ADULT - SUBJECTIVE AND OBJECTIVE BOX
HPI:  64F, ambulates independently at baseline, hx of CHF (last TTE on 1/16/25 EF 30-35%, G2DD), DM, diabetic foot ulcer with a recent admission at [facility] for CHF exacerbation 1/14-1/17, p/w worsening right leg swelling and pain. As per patient, from last admission, patient has noticed that right leg has continued to "secrete" fluids soaking and requiring drying every 2-3 hours. There is increased pain for which patient took Tylenol with no relief and she is unable to ambulate now. She endorses improvement in her left leg and can bear weight on that with no issues. She denies having any history of fever, redness, hot to touch of the right leg since her previous admission. Patient endorses her home medication of furosemide 40 daily is not helping and describes her urine output to be "decreased" and prefers the IV lasix. She continues to endorse orthopnea and shortness of breath w/ exertion. Inquiry about if swelling has progressed to her stomach, she denies saying it is mid-thigh bilaterally only. When inquired about her home CHF medications, she endorses that the Entresto has not been approved by insurance yet so she is currently not taking it.  (23 Jan 2025 16:21). Asked to evaluate pt b/o elevated WBC.      PAST MEDICAL & SURGICAL HISTORY:  DM (diabetes mellitus)      Diabetic foot ulcer      Congestive heart failure (CHF)      CAD (coronary artery disease)      Cataract of both eyes, unspecified cataract type      History of cholecystectomy          MEDS:  acetaminophen     Tablet .. 1000 milliGRAM(s) Oral every 12 hours  ampicillin/sulbactam  IVPB      ampicillin/sulbactam  IVPB 3 Gram(s) IV Intermittent every 6 hours (D2)  aspirin  chewable 81 milliGRAM(s) Oral daily  atorvastatin 40 milliGRAM(s) Oral at bedtime  buMETAnide 1 milliGRAM(s) Oral every 12 hours  enoxaparin Injectable 40 milliGRAM(s) SubCutaneous every 24 hours  gabapentin 100 milliGRAM(s) Oral at bedtime  insulin glargine Injectable (LANTUS) 25 Unit(s) SubCutaneous at bedtime  insulin lispro (ADMELOG) corrective regimen sliding scale   SubCutaneous three times a day before meals  insulin lispro (ADMELOG) corrective regimen sliding scale   SubCutaneous at bedtime  melatonin 3 milliGRAM(s) Oral at bedtime PRN  metoprolol succinate ER 25 milliGRAM(s) Oral daily  ondansetron Injectable 4 milliGRAM(s) IV Push every 8 hours PRN  oxyCODONE    IR 2.5 milliGRAM(s) Oral every 6 hours PRN  oxyCODONE    IR 5 milliGRAM(s) Oral every 6 hours PRN  sacubitril 24 mG/valsartan 26 mG 1 Tablet(s) Oral two times a day      ALLERGIES  No Known Allergies      SOCIAL HISTORY:    FAMILY HISTORY:  Family history of CHF (congestive heart failure) (Mother)        ROS:    General:	    Skin:  	  HEENT:    Respiratory and Thorax:  	  Cardiovascular:	    Abdomen:	    Genitourinary:	    Musculoskeletal:	    Neurological:	    Psychiatric:	    Hematology/Lymphatics:	    Endocrine:	    PHYSICAL EXAM:    Vital Signs Last 24 Hrs  T(C): 36.3 (26 Jan 2025 08:09), Max: 39.1 (25 Jan 2025 16:08)  T(F): 97.3 (26 Jan 2025 08:09), Max: 102.4 (25 Jan 2025 16:08)  HR: 72 (26 Jan 2025 08:09) (72 - 99)  BP: 89/48 (26 Jan 2025 08:09) (89/48 - 113/58)  BP(mean): 68 (26 Jan 2025 04:10) (64 - 68)  RR: 18 (26 Jan 2025 08:09) (18 - 18)  SpO2: 97% (26 Jan 2025 08:09) (95% - 97%)    Parameters below as of 26 Jan 2025 08:09  Patient On (Oxygen Delivery Method): room air          Gen:    HEENT:    Neck:    Lymph Nodes:    Breasts:    Back:    Chest/Thorax:    Cardiovascular:    Gastrointestinal:    Genitourinary:    Rectal:    Extremities:    Vascular:    Neurological:    Skin:    Psychiatric:    LABS/DIAGNOSTIC TESTS:                          9.9    13.81 )-----------( 360      ( 25 Jan 2025 17:44 )             28.6     WBC Count: 13.81 K/uL (01-25 @ 17:44)  WBC Count: 14.26 K/uL (01-25 @ 06:57)  WBC Count: 11.37 K/uL (01-24 @ 05:40)      01-25    138  |  101  |  43[H]  ----------------------------<  112[H]  4.5   |  27  |  1.25    Ca    8.6      25 Jan 2025 17:44  Phos  3.5     01-25  Mg     2.1     01-25    TPro  7.4  /  Alb  2.7[L]  /  TBili  0.6  /  DBili  x   /  AST  30  /  ALT  36  /  AlkPhos  73  01-25      Urine Microscopic-Add On (NC) (01.23.25 @ 14:12)   Bacteria: Few /HPF  Squamous Epithelial Cells: Present  Red Blood Cell - Urine: 0 /HPF  White Blood Cell - Urine: 4 /HPF      LIVER FUNCTIONS - ( 25 Jan 2025 17:44 )  Alb: 2.7 g/dL / Pro: 7.4 g/dL / ALK PHOS: 73 U/L / ALT: 36 U/L DA / AST: 30 U/L / GGT: x              LACTATE:Lactate, Blood: 1.0 mmol/L (01-25 @ 17:44)        CULTURES:     Culture - Urine (collected 01-23-25 @ 14:12)  Source: Clean Catch  Final Report (01-24-25 @ 23:10):    >=3 organisms. Probable collection contamination.    Urinalysis with Rflx Culture (collected 01-23-25 @ 14:12)    Culture - Blood (collected 01-23-25 @ 10:36)  Source: .Blood BLOOD  Preliminary Report (01-25-25 @ 20:01):    No growth at 48 Hours    Culture - Blood (collected 01-23-25 @ 10:30)  Source: .Blood BLOOD  Preliminary Report (01-25-25 @ 20:01):    No growth at 48 Hours        RADIOLOGY  < from: CT Chest No Cont (01.25.25 @ 14:08) >  ACC: [id number] EXAM:  CT CHEST   ORDERED BY: TED CANSECO DATE:  01/25/2025          INTERPRETATION:  Clinical information: Shortness of breath.    CT scan of the chest was obtained without administration of intravenous   contrast.    Several lymph nodes are present in the mediastinum.    Heart is enlarged in size. Calcification of the coronary arteries is   noted. Small pericardial effusion is noted.    No endobronchial lesions are noted. Patchy groundglass opacities noted   within both lungs are felt to be secondary to expiratory   technique/breathing artifact. Atelectasis is noted involving portions of   both lower lobes. This is secondary to small bilateral pleural effusions.    Below the diaphragm, visualized portions of the abdomen demonstrate   patient to be status post cholecystectomy.    Degenerative changes of the spine are noted.    IMPRESSION: Small bilateral pleural effusions and small pericardial   effusion.    --- End of Report ---                     HPI:  64F, ambulates independently at baseline, hx of CHF (last TTE on 1/16/25 EF 30-35%, G2DD), DM, diabetic foot ulcer with a recent admission at [facility] for CHF exacerbation 1/14-1/17, p/w worsening right leg swelling and pain. As per patient, from last admission, patient has noticed that right leg has continued to "secrete" fluids and requiring drying every 2-3 hours. There is increased pain for which patient took Tylenol with no relief and is unable to ambulate now. She endorses improvement in her left leg and can bear weight on that with no issues. She denies having any history of fever, redness, or warmth to touch of the right leg since her previous admission. Patient endorses her home medication of furosemide 40 daily is not helping and describes her urine output to be "decreased" and prefers the IV lasix. She continues to endorse orthopnea and GARG. Inquiry about if swelling has progressed to her stomach, she denies saying it is mid-thigh bilaterally only. When asked about her home CHF medications, she endorses that the Entresto has not been approved by insurance yet so she is currently not taking it.  (23 Jan 2025 16:21). Asked to evaluate pt b/o fever and elevated WBC. At the time of my consult, pt says she has pain in both legs/feet R>L primarily over open areas. Says the LE swelling is better since adm. Denies current fever. Pt was started on ampi/sulbactam yesterday b/o concern for SSTI.       PAST MEDICAL & SURGICAL HISTORY:  DM (diabetes mellitus)      Diabetic foot ulcer      Congestive heart failure (CHF)      CAD (coronary artery disease)      Cataract of both eyes, unspecified cataract type      History of cholecystectomy (lap/obed)          MEDS:  acetaminophen     Tablet .. 1000 milliGRAM(s) Oral every 12 hours  ampicillin/sulbactam  IVPB      ampicillin/sulbactam  IVPB 3 Gram(s) IV Intermittent every 6 hours (D2)  aspirin  chewable 81 milliGRAM(s) Oral daily  atorvastatin 40 milliGRAM(s) Oral at bedtime  buMETAnide 1 milliGRAM(s) Oral every 12 hours  enoxaparin Injectable 40 milliGRAM(s) SubCutaneous every 24 hours  gabapentin 100 milliGRAM(s) Oral at bedtime  insulin glargine Injectable (LANTUS) 25 Unit(s) SubCutaneous at bedtime  insulin lispro (ADMELOG) corrective regimen sliding scale   SubCutaneous three times a day before meals  insulin lispro (ADMELOG) corrective regimen sliding scale   SubCutaneous at bedtime  melatonin 3 milliGRAM(s) Oral at bedtime PRN  metoprolol succinate ER 25 milliGRAM(s) Oral daily  ondansetron Injectable 4 milliGRAM(s) IV Push every 8 hours PRN  oxyCODONE    IR 2.5 milliGRAM(s) Oral every 6 hours PRN  oxyCODONE    IR 5 milliGRAM(s) Oral every 6 hours PRN  sacubitril 24 mG/valsartan 26 mG 1 Tablet(s) Oral two times a day      ALLERGIES  No Known Allergies      SOCIAL HISTORY: emigrated from [place] >30 y ago; no cigs, no ETOH; lives with brother; works as a resp therapist at [employer]    FAMILY HISTORY:  Family history of CHF (congestive heart failure) (Mother)        ROS:    General:	 see HPI    Skin: see HPI  	  HEENT: no complaints    Respiratory and Thorax: no SOB (although GARG by Hx)  	  Cardiovascular:	no CP    GI: denies n, v, diarrhea	    Genitourinary:	no urinary tract complants    Musculoskeletal:	 see HPI    Neurological:	no specific complaints        PHYSICAL EXAM:    Vital Signs Last 24 Hrs  T(C): 36.3 (26 Jan 2025 08:09), Max: 39.1 (25 Jan 2025 16:08)  T(F): 97.3 (26 Jan 2025 08:09), Max: 102.4 (25 Jan 2025 16:08)  HR: 72 (26 Jan 2025 08:09) (72 - 99)  BP: 89/48 (26 Jan 2025 08:09) (89/48 - 113/58)  BP(mean): 68 (26 Jan 2025 04:10) (64 - 68)  RR: 18 (26 Jan 2025 08:09) (18 - 18)  SpO2: 97% (26 Jan 2025 08:09) (95% - 97%)    Parameters below as of 26 Jan 2025 08:09  Patient On (Oxygen Delivery Method): room air          Gen: obese female in NAD    HEENT: NC/AT; conj. clear; mouth--fair oral hygiene    Neck: supple    Lymph Nodes: no enlarged submand,, cervical or supraclav LNs    Back: no vert or CVA tenderness    Chest/Thorax: clear to auscultation    Cardiovascular: S1S2 reg; cd not appreciate any m, g, r    ABD: BS active; soft and non-tender to palpation    Genitourinary: no stiles in place    Extremities: onychomycosis of toenails bilat  LLE: Great toe with dry gangrene;  several small ulcers on tibial surface which are tender to palpation; + mild erythema on dorsal aspect of foot; 2+ edema leg and foot; 1+ edema of toes; not cold to touch  RLE: very cool to touch; + small ulcers on tibial surface tender to palpation; mild erythema  and hyperpigmented changes on dorsal aspect of foot; 2+ edema R leg, foot, and toes    Vascular-did not palpate pulses as both lower extrem are swollen    Neurological: A+ox3    Skin: see above    Psychiatric: affect appropriate      LABS/DIAGNOSTIC TESTS:                          9.9    13.81 )-----------( 360      ( 25 Jan 2025 17:44 )             28.6     WBC Count: 13.81 K/uL (01-25 @ 17:44)  WBC Count: 14.26 K/uL (01-25 @ 06:57)  WBC Count: 11.37 K/uL (01-24 @ 05:40)      01-25    138  |  101  |  43[H]  ----------------------------<  112[H]  4.5   |  27  |  1.25    Ca    8.6      25 Jan 2025 17:44  Phos  3.5     01-25  Mg     2.1     01-25    TPro  7.4  /  Alb  2.7[L]  /  TBili  0.6  /  DBili  x   /  AST  30  /  ALT  36  /  AlkPhos  73  01-25      Urine Microscopic-Add On (NC) (01.23.25 @ 14:12)   Bacteria: Few /HPF  Squamous Epithelial Cells: Present  Red Blood Cell - Urine: 0 /HPF  White Blood Cell - Urine: 4 /HPF      LIVER FUNCTIONS - ( 25 Jan 2025 17:44 )  Alb: 2.7 g/dL / Pro: 7.4 g/dL / ALK PHOS: 73 U/L / ALT: 36 U/L DA / AST: 30 U/L / GGT: x              LACTATE:Lactate, Blood: 1.0 mmol/L (01-25 @ 17:44)        CULTURES:     Culture - Urine (collected 01-23-25 @ 14:12)  Source: Clean Catch  Final Report (01-24-25 @ 23:10):    >=3 organisms. Probable collection contamination.    Urinalysis with Rflx Culture (collected 01-23-25 @ 14:12)    Culture - Blood (collected 01-23-25 @ 10:36)  Source: .Blood BLOOD  Preliminary Report (01-25-25 @ 20:01):    No growth at 48 Hours    Culture - Blood (collected 01-23-25 @ 10:30)  Source: .Blood BLOOD  Preliminary Report (01-25-25 @ 20:01):    No growth at 48 Hours        RADIOLOGY  < from: CT Chest No Cont (01.25.25 @ 14:08) >  ACC: [id number] EXAM:  CT CHEST   ORDERED BY: TED CANSECO DATE:  01/25/2025          INTERPRETATION:  Clinical information: Shortness of breath.    CT scan of the chest was obtained without administration of intravenous   contrast.    Several lymph nodes are present in the mediastinum.    Heart is enlarged in size. Calcification of the coronary arteries is   noted. Small pericardial effusion is noted.    No endobronchial lesions are noted. Patchy groundglass opacities noted   within both lungs are felt to be secondary to expiratory   technique/breathing artifact. Atelectasis is noted involving portions of   both lower lobes. This is secondary to small bilateral pleural effusions.    Below the diaphragm, visualized portions of the abdomen demonstrate   patient to be status post cholecystectomy.    Degenerative changes of the spine are noted.    IMPRESSION: Small bilateral pleural effusions and small pericardial   effusion.    --- End of Report ---

## 2025-01-26 NOTE — PROGRESS NOTE ADULT - ASSESSMENT
A:   B/l LE serous bullae secondary to fluid overload  R hallux dry gangrene    P:   Patient evaluated, chart reviewed and updated   Xrays reviewed - No gas, no OM, no fractures, no abscess  Discussed diagnosis and treatment plans with the patient  Explained to patient that pain is likely from fluid overload and exposed dermis from lanced serous bullae  Vascular team also following patient as she has coldness to the RLE with severe pain and dry ischemic changes to the left hallux  Applied xeroform, DSD, ACE  WCO placed for dressing changes  Patient can WBAT to bilateral LEs  Discussed importance of daily foot examinations, proper shoe gear, and tight glycemic control.   Patient is stable from podiatry's standpoint. No podiatric surgical intervention required at this time  Upon discharge, patient may follow up outpatient with Dr. Steve Palma at [address] [facility], [address], [place] [address]. Please call [phone] to make an appointment within 1 week.   Podiatry to follow while in house  Discussed with attending Dr. Palma    WCO: xeroform, 4x4 gauze, abd pad, maru, and ACE to be applied to b/l LE 3 times a week or as necessary if dressings are saturated.

## 2025-01-26 NOTE — PROGRESS NOTE ADULT - ASSESSMENT
64F, hx of CHF (last TTE on 1/16/25 EF 30-35%, G2DD), DM, diabetic foot ulcer with a recent admission at [facility] for CHF exacerbation 1/14-1/17 p/w worsening R. leg pain and fluid secretion, was meeting sepsis criteria and admitted for continued management of CHF exacerbation      # SIRS (systemic inflammatory response syndrome).   # HF   # CAD  abx per Primary team  -  Metoprolol Succ 25 qD. Not on Entresto due to insurance issues  -c/w Metoprolol Succ 25 qD  -Resume Entresto 24/26 BID  -aspirin 81 qD  -Atorvastatin 40  - CONT BYMEX    - - Nuclear stress tesT  - NOT to be ischemic, no cardiac cath indicated at this time.

## 2025-01-26 NOTE — CONSULT NOTE ADULT - ASSESSMENT
64 year old female with CHF (last TTE on 1/16/25 EF 30-35%, G2DD), DM, diabetic foot ulcer with a recent admission at [facility] for CHF exacerbation 1/14-1/17 admitted to medicine team for R leg pain and swelling. Vascular surgery consulted for RLE coolness.   dopplerable dp pulses bilaterally, palpable femoral pulses    - recommend CTA with vessel runoff  - recommend anabell/pvrs  - local wound care  - will likely need b/l angio at a later time   - discussed with Dr. Baltazar

## 2025-01-26 NOTE — CONSULT NOTE ADULT - SUBJECTIVE AND OBJECTIVE BOX
HPI:  64F, ambulates independently at baseline, hx of CHF (last TTE on 1/16/25 EF 30-35%, G2DD), DM, diabetic foot ulcer [facility]-1/17, p/w worsening right leg swelling and pain. As per patient, from last admission, patient has noticed that right leg has continued to "secrete" fluids soaking and requiring drying every 2-3 hours. There is increased pain for which patient took Tylenol with no relief and she is unable to ambulate now. She endorses improvement in her left leg and can bear weight on that with no issues. She denies having any history of fever, redness, hot to touch of the right leg since her previous admission. Patient endorses her home medication of furosemide 40 daily is not helping and describes her urine output to be "decreased" and prefers the IV lasix. She continues to endorse orthopnea and shortness of breath w/ exertion. Inquiry about if swelling has progressed to her stomach, she denies saying it is mid-thigh bilaterally only. When inquired about her home CHF medications, she endorses that the Entresto has not been approved by insurance yet so she is currently not taking it.  (23 Jan 2025 16:21)    Vascular surgery HPI  64 year old female with Pmhx of CHF (last TTE on 1/16/25 EF 30-35%, G2DD), DM, diabetic foot ulcer [facility]14-1/17 admitted to medicine team for R leg pain and swelling. Vascular surgery consulted for RLE coolness. Patient reports a lot of pain in the right even with touching slightly at the skin region. The pain had startng increasing roughly 3 days ago. Patient denies any hx of vascular intervention and does not follow with any vascular surgeons. Patient does endorse pain also in the left leg however not as severe at the right leg. No fever or chills.     PAST MEDICAL & SURGICAL HISTORY:  DM (diabetes mellitus)      Diabetic foot ulcer      Congestive heart failure (CHF)      CAD (coronary artery disease)      Cataract of both eyes, unspecified cataract type      History of cholecystectomy          MEDICATIONS  (STANDING):  acetaminophen     Tablet .. 1000 milliGRAM(s) Oral every 12 hours  ampicillin/sulbactam  IVPB      ampicillin/sulbactam  IVPB 3 Gram(s) IV Intermittent every 6 hours  aspirin  chewable 81 milliGRAM(s) Oral daily  atorvastatin 40 milliGRAM(s) Oral at bedtime  buMETAnide 1 milliGRAM(s) Oral every 12 hours  enoxaparin Injectable 40 milliGRAM(s) SubCutaneous every 24 hours  gabapentin 100 milliGRAM(s) Oral at bedtime  insulin glargine Injectable (LANTUS) 20 Unit(s) SubCutaneous at bedtime  insulin lispro (ADMELOG) corrective regimen sliding scale   SubCutaneous three times a day before meals  insulin lispro (ADMELOG) corrective regimen sliding scale   SubCutaneous at bedtime  metoprolol succinate ER 25 milliGRAM(s) Oral daily  sacubitril 24 mG/valsartan 26 mG 1 Tablet(s) Oral two times a day    MEDICATIONS  (PRN):  melatonin 3 milliGRAM(s) Oral at bedtime PRN Insomnia  ondansetron Injectable 4 milliGRAM(s) IV Push every 8 hours PRN Nausea and/or Vomiting  oxyCODONE    IR 2.5 milliGRAM(s) Oral every 6 hours PRN Moderate Pain (4 - 6)  oxyCODONE    IR 5 milliGRAM(s) Oral every 6 hours PRN Severe Pain (7 - 10)      Allergies    No Known Allergies    Intolerances        ROS: Negative except per HPI.     SOCIAL HISTORY:  Smoking history   ETOH history    OBJECTIVE:    Vital Signs Last 24 Hrs  T(C): 36.5 (26 Jan 2025 11:02), Max: 39.1 (25 Jan 2025 16:08)  T(F): 97.7 (26 Jan 2025 11:02), Max: 102.4 (25 Jan 2025 16:08)  HR: 78 (26 Jan 2025 11:02) (72 - 96)  BP: 99/55 (26 Jan 2025 11:02) (89/48 - 113/58)  BP(mean): 68 (26 Jan 2025 04:10) (64 - 68)  RR: 18 (26 Jan 2025 11:02) (18 - 18)  SpO2: 97% (26 Jan 2025 11:02) (95% - 97%)    Parameters below as of 26 Jan 2025 11:02  Patient On (Oxygen Delivery Method): room air        I&O's Summary    25 Jan 2025 07:01  -  26 Jan 2025 07:00  --------------------------------------------------------  IN: 425 mL / OUT: 1100 mL / NET: -675 mL        LABS:                        9.9    13.81 )-----------( 360      ( 25 Jan 2025 17:44 )             28.6     01-25    138  |  101  |  43[H]  ----------------------------<  112[H]  4.5   |  27  |  1.25    Ca    8.6      25 Jan 2025 17:44  Phos  3.5     01-25  Mg     2.1     01-25    TPro  7.4  /  Alb  2.7[L]  /  TBili  0.6  /  DBili  x   /  AST  30  /  ALT  36  /  AlkPhos  73  01-25      Urinalysis Basic - ( 25 Jan 2025 17:44 )    Color: x / Appearance: x / SG: x / pH: x  Gluc: 112 mg/dL / Ketone: x  / Bili: x / Urobili: x   Blood: x / Protein: x / Nitrite: x   Leuk Esterase: x / RBC: x / WBC x   Sq Epi: x / Non Sq Epi: x / Bacteria: x      CAPILLARY BLOOD GLUCOSE      POCT Blood Glucose.: 153 mg/dL (26 Jan 2025 11:35)  POCT Blood Glucose.: 87 mg/dL (26 Jan 2025 07:39)  POCT Blood Glucose.: 115 mg/dL (25 Jan 2025 21:27)  POCT Blood Glucose.: 120 mg/dL (25 Jan 2025 16:48)    LIVER FUNCTIONS - ( 25 Jan 2025 17:44 )  Alb: 2.7 g/dL / Pro: 7.4 g/dL / ALK PHOS: 73 U/L / ALT: 36 U/L DA / AST: 30 U/L / GGT: x             Cultures:      PHYSICAL EXAM:   General: AOx3, NAD.   Cardio: RR  Pulm: Normal chest rise and expansion. Non-labored breathing.  Extremities: RLE cool from distal toes to mid shin. LLE warm to touch. B/L dopplerable DP, unable to find PT. B/L femoral pulses present. Various area of sores covered by xeroform on the anterior shin. No ischemic changes noted. Exquisitely tender on the RLE, especially when doing dressing change. Examined pt with Dr. Baltazar.

## 2025-01-26 NOTE — PROGRESS NOTE ADULT - SUBJECTIVE AND OBJECTIVE BOX
Podiatry Interval: Patient was seen sitting in her armchair resting comfortably with her legs elevated. No acute overnight events noted. Patient states she is still having pain around her wounds, especially on the RLE. Patient denies any malodor coming from her dressings, but states her dressings become drenched if not changed daily. Patient denies any numbness, tingling, or burning. Patient denies calf tenderness bilaterally. She has been compliant with keeping her dressings clean, dry, and intact. Patient denies any other pedal complaints and no constitutional symptoms such as F/C/N/V/SOB. AAOx3, NAD.    Podiatry HPI: Patient is a 64 year old female who podiatry was consulted on for right leg pain. Patient was admitted earlier in the month of January for CHF exacerbation with serous blistering of the b/l LE. Patient states that this has not stopped since she was discharged from the hospital and that the pain has remained constant. Patient complains of severe drainage from her RLE from the lanced blisters. Patient originally had an appointment to see podiatry outpatient but could not bear the pain and the amount of drainage from the wound. Patient denies any other pedal complaints. Patient denies any constitutional symptoms.    HPI: 64-year-old presenting with right leg wound was seen recently and admitted for fluid overload hide wound evaluated by podiatry while patient was admitted endorses wound did not heal despite debridement by podiatry endorses noting weeping from wound in her right leg denies any fever nausea vomiting      Medications:  acetaminophen     Tablet .. 1000 milliGRAM(s) Oral every 12 hours  ampicillin/sulbactam  IVPB      ampicillin/sulbactam  IVPB 3 Gram(s) IV Intermittent every 6 hours  aspirin  chewable 81 milliGRAM(s) Oral daily  atorvastatin 40 milliGRAM(s) Oral at bedtime  buMETAnide 1 milliGRAM(s) Oral every 12 hours  enoxaparin Injectable 40 milliGRAM(s) SubCutaneous every 24 hours  gabapentin 100 milliGRAM(s) Oral at bedtime  insulin glargine Injectable (LANTUS) 20 Unit(s) SubCutaneous at bedtime  insulin lispro (ADMELOG) corrective regimen sliding scale   SubCutaneous three times a day before meals  insulin lispro (ADMELOG) corrective regimen sliding scale   SubCutaneous at bedtime  melatonin 3 milliGRAM(s) Oral at bedtime PRN  metoprolol succinate ER 25 milliGRAM(s) Oral daily  ondansetron Injectable 4 milliGRAM(s) IV Push every 8 hours PRN  oxyCODONE    IR 2.5 milliGRAM(s) Oral every 6 hours PRN  oxyCODONE    IR 5 milliGRAM(s) Oral every 6 hours PRN  sacubitril 24 mG/valsartan 26 mG 1 Tablet(s) Oral two times a day    FHx:  Family history of CHF (congestive heart failure) (Mother)    PMHx:   DM (diabetes mellitus)  HTN (hypertension)  Cataract of both eyes, unspecified cataract type  Diabetic foot ulcer  Congestive heart failure (CHF)  CAD (coronary artery disease)    PSHx:  Cataract of both eyes, unspecified cataract type  History of cholecystectomy      Labs                          9.9    13.81 )-----------( 360      ( 25 Jan 2025 17:44 )             28.6      01-25    138  |  101  |  43[H]  ----------------------------<  112[H]  4.5   |  27  |  1.25    Ca    8.6      25 Jan 2025 17:44  Phos  3.5     01-25  Mg     2.1     01-25    TPro  7.4  /  Alb  2.7[L]  /  TBili  0.6  /  DBili  x   /  AST  30  /  ALT  36  /  AlkPhos  73  01-25     Vital Signs Last 24 Hrs  T(C): 36.5 (26 Jan 2025 11:02), Max: 37.2 (25 Jan 2025 18:44)  T(F): 97.7 (26 Jan 2025 11:02), Max: 99 (25 Jan 2025 18:44)  HR: 78 (26 Jan 2025 11:02) (72 - 85)  BP: 99/55 (26 Jan 2025 11:02) (89/48 - 105/55)  BP(mean): 68 (26 Jan 2025 04:10) (64 - 68)  RR: 18 (26 Jan 2025 11:02) (18 - 18)  SpO2: 97% (26 Jan 2025 11:02) (95% - 97%)    Parameters below as of 26 Jan 2025 11:02  Patient On (Oxygen Delivery Method): room air    Sedimentation Rate, Erythrocyte: 82 mm/Hr (01-23-25 @ 10:36)  Sedimentation Rate, Erythrocyte: 62 mm/Hr (12-14-24 @ 04:15)         C-Reactive Protein: 69.8 mg/L (01-23-25 @ 10:36)  C-Reactive Protein: 48.3 mg/L (12-14-24 @ 04:15)  C-Reactive Protein: 121.0 mg/L (12-12-24 @ 15:35)  WBC Count: 13.81 K/uL *H* (01-25-25 @ 17:44)      LE FOCUSED PHYSICAL EXAM:   Vasc: DP/PT 2/4 on the left, with monophasic doppler. DP/PT non-palpable on the right, with monophasic doppler. TG within normal limits on the left, but cool to cold on the right. Non-pitting edema noted diffusely to bilateral lower extremities due to fluid overload. No varicosities observed bilaterally  Derm: Large remnants of serous bullae lanced on previous admission noted on the anterior aspect of bilateral legs and dorsolateral R foot. New serous bullae on the distal aspect of the dorsolateral R foot. Linda-wound erythema observed on the right dorsal foot. Area of dry ischemic changes noted to the distal tip of the left hallux. Darkened skin changes noted to all digits bilaterally (R>L). No fluctuance, no soft tissue crepitus, no malodor, no drainage, no purulence, no streaking, no signs of an acute/active infection noted bilaterally.   Neuro: Protective sensation diminished bilaterally  MSK: Muscle strength deferred. Severe tenderness upon palpation directly over the leg wounds bilaterally. No pain upon ROM of pedal or ankle joints. Negative calf tenderness bilaterally. Compartments compressible, patient able to wiggle toes.      IMAGING:      ACC: [id number] EXAM:  XR FOOT COMP MIN 3 VIEWS RT   ORDERED BY: CHERI ROBERTS   ACC: [id number] EXAM:  XR CHEST PA LAT 2V   ORDERED BY: CHERI ROBERTS   ACC: [id number] EXAM:  XR TIB FIB AP LAT 2 VIEWS RT   ORDERED BY: CHERI ROBERTS   PROCEDURE DATE:  01/23/2025    INTERPRETATION:  Clinical history: 64-year-old female Right foot wound x1   week. Tibial wound.    Four views of the right leg, 3 views of the right foot. Two   expiratory/kyphotic views of the chest are compared to 1/14/2024.    FINDINGS:MSK: Mild degenerative change with no fracture, dislocation,   gross cortical destruction or soft tissue emphysema.    Mild to moderate circumferential subcutaneous soft tissue edema.   Cutaneous wound at the dorsal forefoot    Chest: Normal cardiac silhouette and normal pulmonary vasculature with no   lobar consolidation, pneumothorax.    Small bilateral pleural effusions, improved.    IMPRESSION:    Wound at the dorsal forefoot evident on the lateral view    Mild to moderate circumferential subcutaneous soft tissue edema with no   gas or gross cortical destruction. If there is continued clinical concern   follow-up MRI can be ordered    --- End of Report ---

## 2025-01-26 NOTE — PROGRESS NOTE ADULT - NS ATTEST RISK PROBLEM GEN_ALL_CORE FT
possible ischemic foot requiring discussion with vascular team, obtaining of new imaging, discussion with ID, podiatry

## 2025-01-26 NOTE — PROGRESS NOTE ADULT - ASSESSMENT
#Acute HFrEF exacerbation  #Diabetic Foot ulcer  #DM2  #Transient Fever  #Coronary Artery Disease  #Sepsis 2/2 cellulitis   #Evaluation for ischemic foot      On po bumex, net negative documented 950 but unclear if accurate  - prior to discharge ideally should be monitored on maintenance PO medications before DC  New decreased EF- patient previously declined cath and NST  - Nuclear stress test performed 1/24- abnormal stress test  Discussed with cardiology, fixed defect, do not believe to be ischemic, no cardiac cath indicated at this time.  - Continue with GDMT, betablocker and entresto for now  - transitioned to po diuresis   - Hx of CAD, new decreased EF should at minimum be on aspirin, statin.  -endocrine consult for DM management- resume insulin from previous admission  -- Sugars are too tightly controlled, decreased basal insulin to 25units, dc prandial insulin, continue sliding scale  -Seen by ID today, noted cold right lower ext. Seen by vascular will pursue right lower ext CTA and bilateral ABIs. Followup podiatry/vascular    She had a fever, likely source of infection is her legs. Started on Unasyn per ID, plans to change to augmentin when her acute issues are better resolved.

## 2025-01-26 NOTE — PROGRESS NOTE ADULT - SUBJECTIVE AND OBJECTIVE BOX
INTERVAL HPI/OVERNIGHT EVENTS:   Seen this AM, she reports improvement in her pain with a new gauze they use. Pain localized two areas the dorsal foot and an area on her shin.  No difficulty breathing reporting urinating a lot  I+Os not accurate  She has sensation of her toes.  Denies fever or chills      REVIEW OF SYSTEMS:  neagtive except per hpi    Vital Signs Last 24 Hrs  T(C): 36.7 (26 Jan 2025 15:27), Max: 37.2 (25 Jan 2025 18:44)  T(F): 98.1 (26 Jan 2025 15:27), Max: 99 (25 Jan 2025 18:44)  HR: 81 (26 Jan 2025 15:27) (72 - 85)  BP: 117/64 (26 Jan 2025 15:27) (89/48 - 117/64)  BP(mean): 68 (26 Jan 2025 04:10) (64 - 68)  RR: 18 (26 Jan 2025 15:27) (18 - 18)  SpO2: 97% (26 Jan 2025 15:27) (95% - 97%)    Parameters below as of 26 Jan 2025 15:27  Patient On (Oxygen Delivery Method): room air        PHYSICAL EXAMINATION:  right lower foot cool to touch compared to the left. No acute distress, heart regular, lungs are grossly clear without wheezing nor rhonchi. Abd soft nontender                        9.9    13.81 )-----------( 360      ( 25 Jan 2025 17:44 )             28.6     01-25    138  |  101  |  43[H]  ----------------------------<  112[H]  4.5   |  27  |  1.25    Ca    8.6      25 Jan 2025 17:44  Phos  3.5     01-25  Mg     2.1     01-25    TPro  7.4  /  Alb  2.7[L]  /  TBili  0.6  /  DBili  x   /  AST  30  /  ALT  36  /  AlkPhos  73  01-25    LIVER FUNCTIONS - ( 25 Jan 2025 17:44 )  Alb: 2.7 g/dL / Pro: 7.4 g/dL / ALK PHOS: 73 U/L / ALT: 36 U/L DA / AST: 30 U/L / GGT: x                   CAPILLARY BLOOD GLUCOSE      RADIOLOGY & ADDITIONAL TESTS:

## 2025-01-26 NOTE — CONSULT NOTE ADULT - NS ATTEND AMEND GEN_ALL_CORE FT
pt with severe bilateral CLI  CKD  CHF  right leg rest pain and toe ulcer   obtained CHARMAINE and US

## 2025-01-26 NOTE — CONSULT NOTE ADULT - ASSESSMENT
64F with hx of CHF (last TTE on 1/16/25 EF 30-35%, G2DD), DM, diabetic foot ulcer and a recent admission at [facility] for CHF exacerbation 1/14-1/17, p/w worsening right leg swelling and pain. As per patient, from last admission, patient noticed that right leg has continued to "secrete" fluids, requiring drying every 2-3 hours, and assoc increased pain for which patient took Tylenol with no relief and inability to ambulate. She endorses improvement in her left leg. She denies having any history of fever, redness, or warmth to touch. Patient endorses her home medication of furosemide 40 daily is not helping, describes her urine output to be "decreased" and prefers IV lasix. She continues to endorse orthopnea and GARG. When asked about her home CHF medications, she endorses that the Entresto has not been approved by insurance yet so she is currently not taking it.  (23 Jan 2025 16:21). ID asked to evaluate pt b/o fever and elevated WBC. At the time of my consult, pt says she has pain in both legs/feet R>L primarily over open areas. Says the LE swelling is better since adm. Pt was started on ampi/sulbactam yesterday b/o concern for SSTI. On exam, evidence of SSTI bilat with great concern for vascular compromise of RLE (cold to touch).    #Skin/ soft tissue infection of distal legs/feet in the presence of LE edema. G+ C are most likely etiology. However, of greater concern is thrombosis/embolus to the RLE  --change ampi/sulbactam to Augmentin 875mg p.o. bid  --stat vascular consult  --stat imaging for RLE arterial flow    #DFI (see above)-- L great toe with dry gangrene    #CHF (reduced EF)-- management as per the medical team    #DM-- management as per medical team      Chart reviewed, including notes/labs/imaging; bedside examination; discussion with the patient and Dr. Alexander re Dx/management of SSTI and possible arterial compromise of the RLE; provided coordination of care re ID issues with Dr. Alexander.

## 2025-01-27 LAB
ALBUMIN SERPL ELPH-MCNC: 2.5 G/DL — LOW (ref 3.5–5)
ALP SERPL-CCNC: 77 U/L — SIGNIFICANT CHANGE UP (ref 40–120)
ALT FLD-CCNC: 29 U/L DA — SIGNIFICANT CHANGE UP (ref 10–60)
ANION GAP SERPL CALC-SCNC: 10 MMOL/L — SIGNIFICANT CHANGE UP (ref 5–17)
APTT BLD: 28.7 SEC — SIGNIFICANT CHANGE UP (ref 24.5–35.6)
AST SERPL-CCNC: 25 U/L — SIGNIFICANT CHANGE UP (ref 10–40)
BILIRUB SERPL-MCNC: 0.5 MG/DL — SIGNIFICANT CHANGE UP (ref 0.2–1.2)
BUN SERPL-MCNC: 56 MG/DL — HIGH (ref 7–18)
CALCIUM SERPL-MCNC: 8.5 MG/DL — SIGNIFICANT CHANGE UP (ref 8.4–10.5)
CHLORIDE SERPL-SCNC: 102 MMOL/L — SIGNIFICANT CHANGE UP (ref 96–108)
CO2 SERPL-SCNC: 26 MMOL/L — SIGNIFICANT CHANGE UP (ref 22–31)
CREAT ?TM UR-MCNC: 122 MG/DL — SIGNIFICANT CHANGE UP
CREAT SERPL-MCNC: 1.55 MG/DL — HIGH (ref 0.5–1.3)
EGFR: 37 ML/MIN/1.73M2 — LOW
GLUCOSE BLDC GLUCOMTR-MCNC: 117 MG/DL — HIGH (ref 70–99)
GLUCOSE BLDC GLUCOMTR-MCNC: 127 MG/DL — HIGH (ref 70–99)
GLUCOSE BLDC GLUCOMTR-MCNC: 172 MG/DL — HIGH (ref 70–99)
GLUCOSE BLDC GLUCOMTR-MCNC: 177 MG/DL — HIGH (ref 70–99)
GLUCOSE SERPL-MCNC: 132 MG/DL — HIGH (ref 70–99)
HCT VFR BLD CALC: 27 % — LOW (ref 34.5–45)
HCT VFR BLD CALC: 28.7 % — LOW (ref 34.5–45)
HGB BLD-MCNC: 10.1 G/DL — LOW (ref 11.5–15.5)
HGB BLD-MCNC: 9.7 G/DL — LOW (ref 11.5–15.5)
INR BLD: 1.11 RATIO — SIGNIFICANT CHANGE UP (ref 0.85–1.16)
MCHC RBC-ENTMCNC: 25.2 PG — LOW (ref 27–34)
MCHC RBC-ENTMCNC: 25.5 PG — LOW (ref 27–34)
MCHC RBC-ENTMCNC: 35.2 G/DL — SIGNIFICANT CHANGE UP (ref 32–36)
MCHC RBC-ENTMCNC: 35.9 G/DL — SIGNIFICANT CHANGE UP (ref 32–36)
MCV RBC AUTO: 71.1 FL — LOW (ref 80–100)
MCV RBC AUTO: 71.6 FL — LOW (ref 80–100)
NRBC # BLD: 0 /100 WBCS — SIGNIFICANT CHANGE UP (ref 0–0)
NRBC # BLD: 0 /100 WBCS — SIGNIFICANT CHANGE UP (ref 0–0)
NRBC BLD-RTO: 0 /100 WBCS — SIGNIFICANT CHANGE UP (ref 0–0)
NRBC BLD-RTO: 0 /100 WBCS — SIGNIFICANT CHANGE UP (ref 0–0)
PLATELET # BLD AUTO: 375 K/UL — SIGNIFICANT CHANGE UP (ref 150–400)
PLATELET # BLD AUTO: 394 K/UL — SIGNIFICANT CHANGE UP (ref 150–400)
POTASSIUM SERPL-MCNC: 3.9 MMOL/L — SIGNIFICANT CHANGE UP (ref 3.5–5.3)
POTASSIUM SERPL-SCNC: 3.9 MMOL/L — SIGNIFICANT CHANGE UP (ref 3.5–5.3)
POTASSIUM UR-SCNC: 43 MMOL/L — SIGNIFICANT CHANGE UP
PROT ?TM UR-MCNC: 25 MG/DL — HIGH (ref 0–12)
PROT SERPL-MCNC: 7.1 G/DL — SIGNIFICANT CHANGE UP (ref 6–8.3)
PROT/CREAT UR-RTO: 0.2 RATIO — SIGNIFICANT CHANGE UP (ref 0–0.2)
PROTHROM AB SERPL-ACNC: 12.9 SEC — SIGNIFICANT CHANGE UP (ref 9.9–13.4)
RBC # BLD: 3.8 M/UL — SIGNIFICANT CHANGE UP (ref 3.8–5.2)
RBC # BLD: 4.01 M/UL — SIGNIFICANT CHANGE UP (ref 3.8–5.2)
RBC # FLD: 15.4 % — HIGH (ref 10.3–14.5)
RBC # FLD: 15.4 % — HIGH (ref 10.3–14.5)
SODIUM SERPL-SCNC: 138 MMOL/L — SIGNIFICANT CHANGE UP (ref 135–145)
SODIUM UR-SCNC: 10 MMOL/L — SIGNIFICANT CHANGE UP
WBC # BLD: 12.4 K/UL — HIGH (ref 3.8–10.5)
WBC # BLD: 12.42 K/UL — HIGH (ref 3.8–10.5)
WBC # FLD AUTO: 12.4 K/UL — HIGH (ref 3.8–10.5)
WBC # FLD AUTO: 12.42 K/UL — HIGH (ref 3.8–10.5)

## 2025-01-27 PROCEDURE — 99233 SBSQ HOSP IP/OBS HIGH 50: CPT | Mod: GC

## 2025-01-27 PROCEDURE — 99232 SBSQ HOSP IP/OBS MODERATE 35: CPT

## 2025-01-27 PROCEDURE — 93923 UPR/LXTR ART STDY 3+ LVLS: CPT | Mod: 26

## 2025-01-27 PROCEDURE — G0545: CPT

## 2025-01-27 RX ORDER — HEPARIN SODIUM,PORCINE 10000/ML
3000 VIAL (ML) INJECTION EVERY 6 HOURS
Refills: 0 | Status: DISCONTINUED | OUTPATIENT
Start: 2025-01-27 | End: 2025-01-29

## 2025-01-27 RX ORDER — SENNOSIDES 8.6 MG
2 TABLET ORAL AT BEDTIME
Refills: 0 | Status: DISCONTINUED | OUTPATIENT
Start: 2025-01-27 | End: 2025-01-29

## 2025-01-27 RX ORDER — HEPARIN SODIUM,PORCINE 10000/ML
6500 VIAL (ML) INJECTION EVERY 6 HOURS
Refills: 0 | Status: DISCONTINUED | OUTPATIENT
Start: 2025-01-27 | End: 2025-01-29

## 2025-01-27 RX ORDER — ACETAMINOPHEN 160 MG/5ML
1000 SUSPENSION ORAL EVERY 8 HOURS
Refills: 0 | Status: DISCONTINUED | OUTPATIENT
Start: 2025-01-27 | End: 2025-01-29

## 2025-01-27 RX ORDER — AMOXICILLIN AND CLAVULANATE POTASSIUM 200; 28.5 MG/5ML; MG/5ML
1 POWDER, FOR SUSPENSION ORAL
Refills: 0 | Status: DISCONTINUED | OUTPATIENT
Start: 2025-01-27 | End: 2025-01-27

## 2025-01-27 RX ORDER — POLYETHYLENE GLYCOL 3350 17 G/17G
17 POWDER, FOR SOLUTION ORAL DAILY
Refills: 0 | Status: DISCONTINUED | OUTPATIENT
Start: 2025-01-27 | End: 2025-01-29

## 2025-01-27 RX ORDER — SODIUM CHLORIDE 9 G/ML
500 INJECTION, SOLUTION INTRAVENOUS ONCE
Refills: 0 | Status: COMPLETED | OUTPATIENT
Start: 2025-01-27 | End: 2025-01-27

## 2025-01-27 RX ORDER — HEPARIN SODIUM,PORCINE 10000/ML
VIAL (ML) INJECTION
Qty: 25000 | Refills: 0 | Status: DISCONTINUED | OUTPATIENT
Start: 2025-01-27 | End: 2025-01-29

## 2025-01-27 RX ORDER — AMPICILLIN SODIUM AND SULBACTAM SODIUM 2; 1 G/1; G/1
3 INJECTION, POWDER, FOR SOLUTION INTRAMUSCULAR; INTRAVENOUS EVERY 6 HOURS
Refills: 0 | Status: DISCONTINUED | OUTPATIENT
Start: 2025-01-27 | End: 2025-01-28

## 2025-01-27 RX ORDER — HEPARIN SODIUM,PORCINE 10000/ML
6500 VIAL (ML) INJECTION ONCE
Refills: 0 | Status: COMPLETED | OUTPATIENT
Start: 2025-01-27 | End: 2025-01-27

## 2025-01-27 RX ORDER — SODIUM CHLORIDE 9 G/ML
1000 INJECTION, SOLUTION INTRAVENOUS ONCE
Refills: 0 | Status: COMPLETED | OUTPATIENT
Start: 2025-01-27 | End: 2025-01-27

## 2025-01-27 RX ADMIN — Medication 1: at 11:58

## 2025-01-27 RX ADMIN — OXYCODONE HYDROCHLORIDE 5 MILLIGRAM(S): 30 TABLET ORAL at 21:25

## 2025-01-27 RX ADMIN — OXYCODONE HYDROCHLORIDE 5 MILLIGRAM(S): 30 TABLET ORAL at 05:40

## 2025-01-27 RX ADMIN — Medication 2 TABLET(S): at 21:35

## 2025-01-27 RX ADMIN — OXYCODONE HYDROCHLORIDE 5 MILLIGRAM(S): 30 TABLET ORAL at 20:31

## 2025-01-27 RX ADMIN — Medication 6500 UNIT(S): at 22:45

## 2025-01-27 RX ADMIN — Medication 1: at 17:36

## 2025-01-27 RX ADMIN — OXYCODONE HYDROCHLORIDE 5 MILLIGRAM(S): 30 TABLET ORAL at 04:41

## 2025-01-27 RX ADMIN — POLYETHYLENE GLYCOL 3350 17 GRAM(S): 17 POWDER, FOR SOLUTION ORAL at 12:01

## 2025-01-27 RX ADMIN — AMPICILLIN SODIUM AND SULBACTAM SODIUM 200 GRAM(S): 2; 1 INJECTION, POWDER, FOR SOLUTION INTRAMUSCULAR; INTRAVENOUS at 00:05

## 2025-01-27 RX ADMIN — Medication 1400 UNIT(S)/HR: at 22:46

## 2025-01-27 RX ADMIN — AMPICILLIN SODIUM AND SULBACTAM SODIUM 200 GRAM(S): 2; 1 INJECTION, POWDER, FOR SOLUTION INTRAMUSCULAR; INTRAVENOUS at 11:59

## 2025-01-27 RX ADMIN — AMPICILLIN SODIUM AND SULBACTAM SODIUM 200 GRAM(S): 2; 1 INJECTION, POWDER, FOR SOLUTION INTRAMUSCULAR; INTRAVENOUS at 05:45

## 2025-01-27 RX ADMIN — AMPICILLIN SODIUM AND SULBACTAM SODIUM 200 GRAM(S): 2; 1 INJECTION, POWDER, FOR SOLUTION INTRAMUSCULAR; INTRAVENOUS at 18:57

## 2025-01-27 RX ADMIN — ATORVASTATIN CALCIUM 40 MILLIGRAM(S): 80 TABLET, FILM COATED ORAL at 21:35

## 2025-01-27 RX ADMIN — SODIUM CHLORIDE 2000 MILLILITER(S): 9 INJECTION, SOLUTION INTRAVENOUS at 23:01

## 2025-01-27 RX ADMIN — INSULIN GLARGINE-YFGN 20 UNIT(S): 100 INJECTION, SOLUTION SUBCUTANEOUS at 21:34

## 2025-01-27 RX ADMIN — ASPIRIN 81 MILLIGRAM(S): 81 TABLET, COATED ORAL at 12:01

## 2025-01-27 NOTE — PROGRESS NOTE ADULT - ASSESSMENT
64F, hx of CHF (last TTE on 1/16/25 EF 30-35%, G2DD), DM, diabetic foot ulcer with a recent admission at [facility] for CHF exacerbation 1/14-1/17 p/w worsening R. leg pain and fluid secretion, was meeting sepsis criteria with podiatry having low suspicion for right cellulitis and admitted for continued management of CHF exacerbation and needing ischemic eval. 64F, hx of CHF (last TTE on 1/16/25 EF 30-35%, G2DD), DM, diabetic foot ulcer [facility]14-1/17 p/w worsening R. leg pain and fluid secretion, was meeting sepsis criteria with podiatry having low suspicion for right cellulitis and admitted for continued management of CHF exacerbation and needing ischemic eval. pending CTA aorta with runoff, CHARMAINE/PVR.

## 2025-01-27 NOTE — PROGRESS NOTE ADULT - ASSESSMENT
Search Terms: TOMASZ ALBA, 1960    Search Date: 01/25/2025 12:43:22 PM  The Drug Utilization Report below displays all of the controlled substance prescriptions, if any, that your patient has filled in the last twelve months. The information displayed on this report is compiled from pharmacy submissions to the Department, and accurately reflects the information as submitted by the pharmacies.    This report was requested by: Beth Anderson | Reference #: [id number]

## 2025-01-27 NOTE — PROGRESS NOTE ADULT - PROBLEM SELECTOR PLAN 1
Had fever 101.5 on admission  patient denying any recent history of fever  UA negative  COVID/viral panel negative  Abdomen without pain, soft nontender  discussed with podiatry lower extremity and reviewed photos- lower extremity appears improved compared to prior  Xray with bilateral effusions- check CT chest    -Continue monitoring wbc and fevers- if recurs, start [place] spectrum  -No source of infection thus far, hold antibiotics  -f/u CT chest official read Had fever 101.5 on admission  patient denying any recent history of fever  UA negative  COVID/viral panel negative  Abdomen without pain, soft nontender  discussed with podiatry lower extremity and reviewed photos- lower extremity appears improved compared to prior  Xray with bilateral effusions- check CT chest  CT chest: Small bilateral pleural effusions and small pericardial effusion.    -c/w Unasyn at this time, switch to augmentin per ID when appropriate

## 2025-01-27 NOTE — PROGRESS NOTE ADULT - SUBJECTIVE AND OBJECTIVE BOX
Source of information: TOMASZ ALBA, Chart review  Patient language: English  : n/a    HPI:  64F, ambulates independently at baseline, hx of CHF (last TTE on 1/16/25 EF 30-35%, G2DD), DM, diabetic foot ulcer with a recent admission at [facility] for CHF exacerbation 1/14-1/17, p/w worsening right leg swelling and pain. As per patient, from last admission, patient has noticed that right leg has continued to "secrete" fluids soaking and requiring drying every 2-3 hours. There is increased pain for which patient took Tylenol with no relief and she is unable to ambulate now. She endorses improvement in her left leg and can bear weight on that with no issues. She denies having any history of fever, redness, hot to touch of the right leg since her previous admission. Patient endorses her home medication of furosemide 40 daily is not helping and describes her urine output to be "decreased" and prefers the IV lasix. She continues to endorse orthopnea and shortness of breath w/ exertion. Inquiry about if swelling has progressed to her stomach, she denies saying it is mid-thigh bilaterally only. When inquired about her home CHF medications, she endorses that the Entresto has not been approved by insurance yet so she is currently not taking it.  (23 Jan 2025 16:21)    Patient is a 64y old  Female who presents with a chief complaint of R. Leg pain and swelling (25 Jan 2025 07:37)    Right foot x-ray demonstrated - Wound at the dorsal forefoot evident on the lateral view  Mild to moderate circumferential subcutaneous soft tissue edema with no   gas or gross cortical destruction.    Pt is admitted for right leg pain wound, reports blisters that "popped" and she was evaluated by a podiatrist prior to hospital admission. Pain service consulted for management of  RLE pain on 1/25. Pt seen and examined at bedside. Sitting in chair, denies pain at this time. SCALE USED: (1-10 VNRS). Reports current pain regimen effectively managing her pain.  Pt tolerating PO diet. Denies lethargy, chest pain, SOB, nausea, vomiting, constipation. Reports last BM 1/25. Patient stated goal for pain control: to be able to take deep breaths, get out of bed to chair and ambulate with tolerable pain control. Pt denies taking medications for pain at home.     PAST MEDICAL & SURGICAL HISTORY:    DM (diabetes mellitus)    Diabetic foot ulcer    Congestive heart failure (CHF)    CAD (coronary artery disease)    Cataract of both eyes, unspecified cataract type    History of cholecystectomy    FAMILY HISTORY:  Family history of CHF (congestive heart failure) (Mother)    Social History:  [X] Denies ETOH use, illicit drug use and smoking    Allergies  No Known Allergies      MEDICATIONS  (STANDING):  ampicillin/sulbactam  IVPB 3 Gram(s) IV Intermittent every 6 hours  aspirin  chewable 81 milliGRAM(s) Oral daily  atorvastatin 40 milliGRAM(s) Oral at bedtime  enoxaparin Injectable 40 milliGRAM(s) SubCutaneous every 24 hours  gabapentin 100 milliGRAM(s) Oral at bedtime  insulin glargine Injectable (LANTUS) 20 Unit(s) SubCutaneous at bedtime  insulin lispro (ADMELOG) corrective regimen sliding scale   SubCutaneous three times a day before meals  insulin lispro (ADMELOG) corrective regimen sliding scale   SubCutaneous at bedtime  metoprolol succinate ER 25 milliGRAM(s) Oral daily  polyethylene glycol 3350 17 Gram(s) Oral daily  senna 2 Tablet(s) Oral at bedtime    MEDICATIONS  (PRN):  acetaminophen     Tablet .. 1000 milliGRAM(s) Oral every 8 hours PRN Moderate Pain (4 - 6)  melatonin 3 milliGRAM(s) Oral at bedtime PRN Insomnia  ondansetron Injectable 4 milliGRAM(s) IV Push every 8 hours PRN Nausea and/or Vomiting  oxyCODONE    IR 5 milliGRAM(s) Oral every 4 hours PRN Severe Pain (7 - 10)      Vital Signs Last 24 Hrs  T(C): 36.2 (27 Jan 2025 11:16), Max: 37 (26 Jan 2025 23:20)  T(F): 97.1 (27 Jan 2025 11:16), Max: 98.6 (26 Jan 2025 23:20)  HR: 83 (27 Jan 2025 11:16) (76 - 96)  BP: 107/66 (27 Jan 2025 11:16) (95/58 - 117/64)  BP(mean): 74 (27 Jan 2025 11:16) (68 - 74)  RR: 18 (27 Jan 2025 11:16) (18 - 18)  SpO2: 97% (27 Jan 2025 11:16) (92% - 97%)    Parameters below as of 27 Jan 2025 11:16  Patient On (Oxygen Delivery Method): room air        LABS: Reviewed                          9.7    12.40 )-----------( 375      ( 27 Jan 2025 06:55 )             27.0     01-27    138  |  102  |  56[H]  ----------------------------<  132[H]  3.9   |  26  |  1.55[H]    Ca    8.5      27 Jan 2025 06:55  Phos  3.5     01-25  Mg     2.1     01-25    TPro  7.1  /  Alb  2.5[L]  /  TBili  0.5  /  DBili  x   /  AST  25  /  ALT  29  /  AlkPhos  77  01-27      LIVER FUNCTIONS - ( 27 Jan 2025 06:55 )  Alb: 2.5 g/dL / Pro: 7.1 g/dL / ALK PHOS: 77 U/L / ALT: 29 U/L DA / AST: 25 U/L / GGT: x           Urinalysis Basic - ( 27 Jan 2025 06:55 )    Color: x / Appearance: x / SG: x / pH: x  Gluc: 132 mg/dL / Ketone: x  / Bili: x / Urobili: x   Blood: x / Protein: x / Nitrite: x   Leuk Esterase: x / RBC: x / WBC x   Sq Epi: x / Non Sq Epi: x / Bacteria: x      CAPILLARY BLOOD GLUCOSE      POCT Blood Glucose.: 172 mg/dL (27 Jan 2025 11:35)  POCT Blood Glucose.: 117 mg/dL (27 Jan 2025 08:06)  POCT Blood Glucose.: 157 mg/dL (26 Jan 2025 21:12)  POCT Blood Glucose.: 123 mg/dL (26 Jan 2025 16:51)    Radiology: Reviewed.   < from: Xray Foot AP + Lateral + Oblique, Right (01.23.25 @ 12:17) >    ACC: [id number] EXAM:  XR FOOT COMP MIN 3 VIEWS RT   ORDERED BY: CHERI ROBERTS     ACC: [id number] EXAM:  XR CHEST PA LAT 2V   ORDERED BY: CHERI ROBERTS     ACC: [id number] EXAM:  XR TIB FIB AP LAT 2 VIEWS RT   ORDERED BY: CHERI ROBERTS     PROCEDURE DATE:  01/23/2025      INTERPRETATION:  Clinical history: 64-year-old female Right foot wound x1   week. Tibial wound.    Four views of the right leg, 3 views of the right foot. Two   expiratory/kyphotic views of the chest are compared to 1/14/2024.    FINDINGS:MSK: Mild degenerative change with no fracture, dislocation,   gross cortical destruction or soft tissue emphysema.    Mild to moderate circumferential subcutaneous soft tissue edema.   Cutaneous wound at the dorsal forefoot    Chest: Normal cardiac silhouette and normal pulmonary vasculature with no   lobar consolidation, pneumothorax.    Small bilateral pleural effusions, improved.    IMPRESSION:    Wound at the dorsal forefoot evident on the lateral view    Mild to moderate circumferential subcutaneous soft tissue edema with no   gas or gross cortical destruction. If there is continued clinical concern   follow-up MRI can be ordered    --- End of Report --      EUSEBIO OQUENDO DO; Attending Radiologist  This document has been electronically signed. Jan 23 2025  2:45PM    < end of copied text >    ORT Score -   Family Hx of substance abuse	Female	      Male  Alcohol 	                                           1                     3  Illegal drugs	                                   2                     3  Rx drugs                                           4 	                  4  Personal Hx of substance abuse		  Alcohol 	                                          3	                  3  Illegal drugs                                     4	                  4  Rx drugs                                            5 	                  5  Age between 16- 45 years	           1                     1  hx preadolescent sexual abuse	   3 	                  0  Psychological disease		  ADD, OCD, bipolar, schizophrenia   2	          2  Depression                                           1 	          1  Total: 0    a score of 3 or lower indicates low risk for opioid abuse		  a score of 4-7 indicates moderate risk for opioid abuse		  a score of 8 or higher indicates high risk for opioid abuse      REVIEW OF SYSTEMS:  CONSTITUTIONAL: No fever or fatigue  HEENT:  No difficulty hearing, no change in vision  NECK: No pain or stiffness  RESPIRATORY: No cough, wheezing, chills or hemoptysis; + shortness of breath on exertion  CARDIOVASCULAR: No chest pain, palpitations, dizziness, or leg swelling  GASTROINTESTINAL: No loss of appetite, decreased PO intake. No abdominal or epigastric pain. No nausea, vomiting; No diarrhea or constipation.   GENITOURINARY: No dysuria, frequency, hematuria, retention or incontinence  MUSCULOSKELETAL: Denies extremity pain at this time. No  back pain, no upper or lower extremity motor strength weakness, no saddle anesthesia, bowel/bladder incontinence, no falls   NEURO: No headaches, No numbness/tingling b/l LE, No weakness    PHYSICAL EXAM:  GENERAL:  Alert & Oriented X4, cooperative, NAD, Good concentration. Speech is clear.   RESPIRATORY: Respirations even and unlabored. Clear to auscultation bilaterally; No rales, rhonchi, wheezing, or rubs  CARDIOVASCULAR: Normal S1/S2, regular rate and rhythm; No murmurs, rubs, or gallops.   GASTROINTESTINAL:  Soft, Nontender, Nondistended; Bowel sounds present  PERIPHERAL VASCULAR:  Extremities warm with moderate b/l lower extremity edema, Unable to assess pulses due to dressings, No cyanosis, No calf tenderness  MUSCULOSKELETAL: Motor Strength 5/5 B/L upper and 4/5 lower extremities; moves all extremities equally against gravity  SKIN: Warm, dry, intact. ace-wrap on BLE's intact    Risk factors associated with adverse outcomes related to opioid treatment  [ ]  Concurrent benzodiazepine use  [ ]  History/ Active substance use or alcohol use disorder  [ ] Psychiatric co-morbidity  [ ] Sleep apnea  [ ] COPD  [ ] BMI> 35  [ ] Liver dysfunction  [ ] Renal dysfunction  [X ] CHF  [ ] Smoker  [X]  Age > 60 years    [X]  [place]  Reviewed and Copied to Chart. See below.    Plan of care and goal oriented pain management treatment options were discussed with patient and /or primary care giver; all questions and concerns were addressed and care was aligned with patient's wishes.    Educated patient on goal oriented pain management treatment options     01-27-25 @ 13:07

## 2025-01-27 NOTE — PROGRESS NOTE ADULT - PROBLEM SELECTOR PLAN 2
Hx of CHF, previous admission in January, on home Lasix 40 qD, Metoprolol Succ 25 qD. Not on Entresto due to insurance issues  Last TTE: LVSF moderately decreased w/ EF 30-35 %. Moderate G2DD. Mild MR  Cardio consulted, Dr. Gilmore rec ischemic eval, pt now agreeable for stress test  Thoroughly discussed plan with pt and brother at bedside  Stress test: small-sized, moderate defect(s) in the apical wall that is predominantly fixed suggestive of an infarction with minimal marcus-infarct ischemia    -Admit to tele + q4 VS  -Strict I/Os  -Fluid Restrict 1.5 L  -Daily Weights  -IV Bumex 1 BID (can transition to PO 1/26)  -c/w Metoprolol Succ 25 qD  -Restarted entresto 24/26 BID  -f/u Cardio recs Hx of CHF, previous admission in January, on home Lasix 40 qD, Metoprolol Succ 25 qD. Not on Entresto due to insurance issues  Last TTE: LVSF moderately decreased w/ EF 30-35 %. Moderate G2DD. Mild MR  Cardio consulted, Dr. Gilmore rec ischemic eval, pt now agreeable for stress test  Thoroughly discussed plan with pt and brother at bedside  Stress test: small-sized, moderate defect(s) in the apical wall that is predominantly fixed suggestive of an infarction with minimal marcus-infarct ischemia    -Admit to tele + q4 VS  -Strict I/Os  -Fluid Restrict 1.5 L  -Daily Weights  -hold Bumex iso needed CTA and worsening kidney fx  -c/w Metoprolol Succ 25 qD  -hold entresto Bumex iso needed CTA and worsening kidney fx

## 2025-01-27 NOTE — PROGRESS NOTE ADULT - SUBJECTIVE AND OBJECTIVE BOX
Podiatry Interval: Patient was seen sitting in her armchair resting comfortably with her legs elevated. No acute overnight events noted. Patient states she is still having pain around her wounds, especially on the RLE but it has improved with pain medication. Patient denies any malodor coming from her dressings, but states her dressings become drenched if not changed daily. Patient denies any numbness, tingling, or burning. Patient denies calf tenderness bilaterally. She has been compliant with keeping her dressings clean, dry, and intact. Patient denies any other pedal complaints and no constitutional symptoms such as F/C/N/V/SOB. AAOx3, NAD.    Podiatry HPI: Patient is a 64 year old female who podiatry was consulted on for right leg pain. Patient was admitted earlier in the month of January for CHF exacerbation with serous blistering of the b/l LE. Patient states that this has not stopped since she was discharged from the hospital and that the pain has remained constant. Patient complains of severe drainage from her RLE from the lanced blisters. Patient originally had an appointment to see podiatry outpatient but could not bear the pain and the amount of drainage from the wound. Patient denies any other pedal complaints. Patient denies any constitutional symptoms.    HPI: 64-year-old presenting with right leg wound was seen recently and admitted for fluid overload hide wound evaluated by podiatry while patient was admitted endorses wound did not heal despite debridement by podiatry endorses noting weeping from wound in her right leg denies any fever nausea vomiting      Medications acetaminophen     Tablet .. 1000 milliGRAM(s) Oral every 8 hours PRN  ampicillin/sulbactam  IVPB 3 Gram(s) IV Intermittent every 6 hours  aspirin  chewable 81 milliGRAM(s) Oral daily  atorvastatin 40 milliGRAM(s) Oral at bedtime  enoxaparin Injectable 40 milliGRAM(s) SubCutaneous every 24 hours  gabapentin 100 milliGRAM(s) Oral at bedtime  insulin glargine Injectable (LANTUS) 20 Unit(s) SubCutaneous at bedtime  insulin lispro (ADMELOG) corrective regimen sliding scale   SubCutaneous three times a day before meals  insulin lispro (ADMELOG) corrective regimen sliding scale   SubCutaneous at bedtime  melatonin 3 milliGRAM(s) Oral at bedtime PRN  metoprolol succinate ER 25 milliGRAM(s) Oral daily  ondansetron Injectable 4 milliGRAM(s) IV Push every 8 hours PRN  oxyCODONE    IR 5 milliGRAM(s) Oral every 4 hours PRN  polyethylene glycol 3350 17 Gram(s) Oral daily  senna 2 Tablet(s) Oral at bedtime    FHFamily history of CHF (congestive heart failure) (Mother)    ,   PMHDM (diabetes mellitus)    HTN (hypertension)    Cataract of both eyes, unspecified cataract type    Diabetic foot ulcer    Congestive heart failure (CHF)    CAD (coronary artery disease)       PSHNo significant past surgical history    Cataract of both eyes, unspecified cataract type    History of cholecystectomy        Labs                          9.7    12.40 )-----------( 375      ( 27 Jan 2025 06:55 )             27.0      01-27    138  |  102  |  56[H]  ----------------------------<  132[H]  3.9   |  26  |  1.55[H]    Ca    8.5      27 Jan 2025 06:55  Phos  3.5     01-25  Mg     2.1     01-25    TPro  7.1  /  Alb  2.5[L]  /  TBili  0.5  /  DBili  x   /  AST  25  /  ALT  29  /  AlkPhos  77  01-27     Vital Signs Last 24 Hrs  T(C): 36.2 (27 Jan 2025 11:16), Max: 37 (26 Jan 2025 23:20)  T(F): 97.1 (27 Jan 2025 11:16), Max: 98.6 (26 Jan 2025 23:20)  HR: 83 (27 Jan 2025 11:16) (76 - 96)  BP: 107/66 (27 Jan 2025 11:16) (95/58 - 117/64)  BP(mean): 74 (27 Jan 2025 11:16) (68 - 74)  RR: 18 (27 Jan 2025 11:16) (18 - 18)  SpO2: 97% (27 Jan 2025 11:16) (92% - 97%)    Parameters below as of 27 Jan 2025 11:16  Patient On (Oxygen Delivery Method): room air      Sedimentation Rate, Erythrocyte: 82 mm/Hr (01-23-25 @ 10:36)  Sedimentation Rate, Erythrocyte: 62 mm/Hr (12-14-24 @ 04:15)         C-Reactive Protein: 69.8 mg/L (01-23-25 @ 10:36)  C-Reactive Protein: 48.3 mg/L (12-14-24 @ 04:15)  C-Reactive Protein: 121.0 mg/L (12-12-24 @ 15:35)   WBC Count: 12.40 K/uL *H* (01-27-25 @ 06:55)      LE FOCUSED PHYSICAL EXAM:   Vasc: DP/PT 2/4 on the left, with monophasic doppler. DP/PT non-palpable on the right, with monophasic doppler. TG within normal limits on the left, but cool to cold on the right. Non-pitting edema noted diffusely to bilateral lower extremities due to fluid overload. No varicosities observed bilaterally  Derm: Large remnants of serous bullae lanced on previous admission noted on the anterior aspect of bilateral legs and dorsolateral R foot. New serous bullae on the distal aspect of the dorsolateral R foot. Linda-wound erythema observed on the right dorsal foot. Area of dry ischemic changes noted to the distal tip of the left hallux. Darkened skin changes noted to all digits bilaterally (R>L). No fluctuance, no soft tissue crepitus, no malodor, no drainage, no purulence, no streaking, no signs of an acute/active infection noted bilaterally.   Neuro: Protective sensation diminished bilaterally  MSK: Muscle strength deferred. Severe tenderness upon palpation directly over the leg wounds bilaterally. No pain upon ROM of pedal or ankle joints. Negative calf tenderness bilaterally. Compartments compressible, patient able to wiggle toes.      IMAGING:      ACC: [id number] EXAM:  XR FOOT COMP MIN 3 VIEWS RT   ORDERED BY: CHERI ROBERTS   ACC: [id number] EXAM:  XR CHEST PA LAT 2V   ORDERED BY: CHERI ROBERTS   ACC: [id number] EXAM:  XR TIB FIB AP LAT 2 VIEWS RT   ORDERED BY: CHERI ROBERTS   PROCEDURE DATE:  01/23/2025    INTERPRETATION:  Clinical history: 64-year-old female Right foot wound x1   week. Tibial wound.    Four views of the right leg, 3 views of the right foot. Two   expiratory/kyphotic views of the chest are compared to 1/14/2024.    FINDINGS:MSK: Mild degenerative change with no fracture, dislocation,   gross cortical destruction or soft tissue emphysema.    Mild to moderate circumferential subcutaneous soft tissue edema.   Cutaneous wound at the dorsal forefoot    Chest: Normal cardiac silhouette and normal pulmonary vasculature with no   lobar consolidation, pneumothorax.    Small bilateral pleural effusions, improved.    IMPRESSION:    Wound at the dorsal forefoot evident on the lateral view    Mild to moderate circumferential subcutaneous soft tissue edema with no   gas or gross cortical destruction. If there is continued clinical concern   follow-up MRI can be ordered    --- End of Report ---

## 2025-01-27 NOTE — PROGRESS NOTE ADULT - PROBLEM SELECTOR PLAN 3
Hx of DM on home Tresiba 30u, Cequer insulin pump  Endo consulted, Dr. Bhagat  A1c 11.6  Restarted on previous admission insulin regimen on admission: Lantus 30u bedtime+low ISS, Lispro 10u TID+low ISS    -Decreased lantus to 25u + ISS ACHS  -f/u Endo recs Hx of DM on home Tresiba 30u, Cequer insulin pump  Endo consulted, Dr. Bhagat  A1c 11.6  Restarted on previous admission insulin regimen on admission: Lantus 30u bedtime+low ISS, Lispro 10u TID+low ISS    -decrease lantus to 20u + ISS ACHS  -f/u Endo recs

## 2025-01-27 NOTE — PROGRESS NOTE ADULT - ASSESSMENT
64y.o. Female with RLE coolness    -Awaiting CTA aorta with runoff  -Awaiting CHARMAINE/PVR  -Keep compression dressing  -LE elevation above heart level at rest  -OOB ambulate

## 2025-01-27 NOTE — CONSULT NOTE ADULT - SUBJECTIVE AND OBJECTIVE BOX
PATIENT SEEN AND EXAMINED; FULL NOTE TO FOLLOW     hold diuresis/ entresto due to jenny. plan for ct with iv contrast once jenny resolves.   will need diuresis post ct with iv contrast due to fluid overload [facility] NEPHROLOGY- CONSULTATION NOTE    Patient is a 65yo Female with CHF (last TTE on 1/16/25 EF 30-35%, G2DD), DM, diabetic foot ulcer with a recent admission at [facility] for CHF exacerbation 1/14-1/17 p/w RLE swelling/ blister with pain. Pt a/w RLE soft tissue infection, and  diuresed for CHF. Pt found to have cool RLE and is pending CTA aorta with runoff. Nephrology consulted for Elevated serum creatinine.    Pt denies any h/o kidney disease. Patient denies any NSAID use, recent CT with contrast, hepatitis or blood transfusions.  Pt denies any urinary complaints.   Pt c/o orthopenea and unable to lay flat due to SOB. Denies any SOB sitting upright.   Pt denies any  chest pain, n/v/d, or  abd pain, + LE edema.      PAST MEDICAL & SURGICAL HISTORY:  DM (diabetes mellitus)      Diabetic foot ulcer      Congestive heart failure (CHF)      CAD (coronary artery disease)      Cataract of both eyes, unspecified cataract type      History of cholecystectomy        No Known Allergies    Home Medications Reviewed  Hospital Medications:   MEDICATIONS  (STANDING):  ampicillin/sulbactam  IVPB 3 Gram(s) IV Intermittent every 6 hours  aspirin  chewable 81 milliGRAM(s) Oral daily  atorvastatin 40 milliGRAM(s) Oral at bedtime  enoxaparin Injectable 40 milliGRAM(s) SubCutaneous every 24 hours  insulin glargine Injectable (LANTUS) 20 Unit(s) SubCutaneous at bedtime  insulin lispro (ADMELOG) corrective regimen sliding scale   SubCutaneous three times a day before meals  insulin lispro (ADMELOG) corrective regimen sliding scale   SubCutaneous at bedtime  metoprolol succinate ER 25 milliGRAM(s) Oral daily  polyethylene glycol 3350 17 Gram(s) Oral daily  senna 2 Tablet(s) Oral at bedtime    SOCIAL HISTORY:  Denies ETOh, Smoking, or drug use  FAMILY HISTORY:  Family history of CHF (congestive heart failure) (Mother)        REVIEW OF SYSTEMS:  Gen: no changes in weight  HEENT: no rhinorrhea  Neck: no sore throat  Cards: no chest pain  Resp: no dyspnea at rest +orthopnea  GI: no nausea or vomiting or diarrhea  : no dysuria or hematuria  Vascular: +LE edema  Derm: no rashes  Neuro: no numbness/tingling  All other review of systems is negative unless indicated above.    VITALS:  T(F): 97.4 (01-27-25 @ 16:42), Max: 98.6 (01-26-25 @ 23:20)  HR: 80 (01-27-25 @ 16:42)  BP: 109/59 (01-27-25 @ 16:42)  RR: 17 (01-27-25 @ 16:42)  SpO2: 99% (01-27-25 @ 16:42)  Wt(kg): --    01-26 @ 07:01  -  01-27 @ 07:00  --------------------------------------------------------  IN: 670 mL / OUT: 650 mL / NET: 20 mL        PHYSICAL EXAM:  Gen: NAD, calm  HEENT: MMM  Neck: no JVD  Cards: RRR, +S1/S2, no M/G/R  Resp: Rt basilar rales, decreased left base  GI: soft, NT/ND, NABS  : no CVA tenderness  Extremities: + 2 LE edema B/L, b/l LE wrapped  Derm: +left hallux dry gangrene  Neuro: non-focal    LABS:  01-27    138  |  102  |  56[H]  ----------------------------<  132[H]  3.9   |  26  |  1.55[H]    Ca    8.5      27 Jan 2025 06:55    TPro  7.1  /  Alb  2.5[L]  /  TBili  0.5  /  DBili      /  AST  25  /  ALT  29  /  AlkPhos  77  01-27    Creatinine Trend: 1.55 <--, 1.25 <--, 1.25 <--, 1.17 <--, 1.11 <--                        9.7    12.40 )-----------( 375      ( 27 Jan 2025 06:55 )             27.0     Urine Studies:  Urinalysis Basic - ( 27 Jan 2025 06:55 )    Color:  / Appearance:  / SG:  / pH:   Gluc: 132 mg/dL / Ketone:   / Bili:  / Urobili:    Blood:  / Protein:  / Nitrite:    Leuk Esterase:  / RBC:  / WBC    Sq Epi:  / Non Sq Epi:  / Bacteria:       Sodium, Random Urine: 10 mmol/L (01-27 @ 13:10)  Potassium, Random Urine: 43 mmol/L (01-27 @ 13:10)  Creatinine, Random Urine: 122 mg/dL (01-27 @ 13:10)  Protein/Creatinine Ratio Calculation: 0.2 Ratio (01-27 @ 13:10)    RADIOLOGY & ADDITIONAL STUDIES:    < from: CT Chest No Cont (01.25.25 @ 14:08) >    ACC: [id number] EXAM:  CT CHEST   ORDERED BY: TED MONROY     PROCEDURE DATE:  01/25/2025        < end of copied text >  < from: CT Chest No Cont (01.25.25 @ 14:08) >    IMPRESSION: Small bilateral pleural effusions and small pericardial   effusion.    --- End of Report ---        < end of copied text >

## 2025-01-27 NOTE — PROGRESS NOTE ADULT - PROBLEM SELECTOR PLAN 4
Podiatry following, b/l serous bullae, no concerns for infection  Consulted pain management  s/p morphine 2mg x 1 prior to stress test    Pain recs:  -Oxycodone 2.5 mg/5 mg PO q 6 hours PRN moderate/severe pain.   -Acetaminophen 1 gram PO q 12 hours for 3 days. Monitor LFTs  - Appreciate pain recs, gabapentin with remote risk to increase risk of chf. WIll start gabapentin 100mg at bedtime Podiatry following, b/l serous bullae, no concerns for infection  Consulted pain management  s/p morphine 2mg x 1 prior to stress test    Pain recs:  -Oxycodone 2.5 mg/5 mg PO q 6 hours PRN moderate/severe pain.   -Acetaminophen 1 gram PO q 12 hours for 3 days. Monitor LFTs  - Appreciate pain recs, gabapentin with remote risk to increase risk of chf. WIll start gabapentin 100mg at bedtime    -pending CTA aorta with runoff pending improved kidney function 1/28

## 2025-01-27 NOTE — PROGRESS NOTE ADULT - ASSESSMENT
64F with hx of CHF (last TTE on 1/16/25 EF 30-35%, G2DD), DM, diabetic foot ulcer and a recent admission at [facility] beds at the [facility] for CHF exacerbation 1/14-1/17, p/w worsening right leg swelling and pain. As per patient, from last admission, patient noticed that right leg has continued to "secrete" fluids, requiring drying every 2-3 hours, and assoc increased pain for which patient took Tylenol with no relief and inability to ambulate. She endorses improvement in her left leg. She denies having any history of fever, redness, or warmth to touch. Patient endorses her home medication of furosemide 40 daily is not helping, describes her urine output to be "decreased" and prefers IV lasix. She continues to endorse orthopnea and GARG. When asked about her home CHF medications, she endorses that the Entresto has not been approved by insurance yet so she is currently not taking it.  (23 Jan 2025 16:21). ID asked to evaluate pt b/o fever and elevated WBC. At the time of my consult, pt says she has pain in both legs/feet R>L primarily over open areas. Says the LE swelling is better since adm. Pt was started on ampi/sulbactam yesterday b/o concern for SSTI. On exam, evidence of SSTI bilat with great concern for vascular compromise of RLE (cold to touch). Pt evaluated by [facility] surgery underwent CT angio--results pending. Says she's having decreased pain in RLE today. As per discussion with Dr. Alexander, cont. ampi/sulbactam.     #Skin/ soft tissue infection of distal legs/feet in the presence of LE edema. G+ C are most likely etiology. However, of greater concern is thrombosis/embolus to the RLE  --cont. ampi/sulbactam  --schedule CHARMAINE/PVRs  --f/u angiogram    #DFI (see above)-- L great toe with dry gangrene    #CHF (reduced EF)-- management as per the medical team    #DM-- management as per medical team      Chart reviewed, including notes/labs/imaging; bedside examination; discussion with the patient and Dr. Alexander re Dx/management of SSTI and possible arterial compromise of the RLE; provided coordination of care re ID issues with Dr. Alexander.

## 2025-01-27 NOTE — PROGRESS NOTE ADULT - PROBLEM SELECTOR PLAN 1
Pt with acute RLE pain which is somatic and neuropathic in nature due to right lower extremity wounds.   High risk medications reviewed. Avoid polypharmacy. Avoid IV opioids. Avoid NSAIDs and benzodiazepines. Non-pharmacological sleep aides initiated. Non-opioid medications and non-pharmacological pain management measures initiated.   Opioid pain recommendations   - Continue Oxycodone 5 mg PO q  hours PRN severe pain. Monitor for sedation/ respiratory depression.   Non-opioid pain recommendations   - Acetaminophen 1 gram PO q 8h PRN moderate pain.   - Gabapentin is not recommended in pts with CHF.- Will discontinue   Bowel Regimen  - Miralax 17G PO daily  - Senna 2 tablets at bedtime for constipation  Mild pain 1 - 3  - Non-pharmacological pain treatment recommendations  - Warm/ Cool packs PRN   - Repositioning guided imagery, relaxation, distraction.  - Physical therapy OOB if no contraindications   Recommendations discussed with primary team and RN. Pain team will sign off at this time, please reconsult if needed

## 2025-01-27 NOTE — PROGRESS NOTE ADULT - SUBJECTIVE AND OBJECTIVE BOX
MR#[id number]  PATIENT NAME:COLE ALBA    DATE OF SERVICE: 01-27-25 @ 0944  Patient was seen and examined by Yordy Gilmore MD on    01-27-25 @ 0930  Interim events noted.Consultant notes ,Labs,Telemetry reviewed by me       HOSPITAL COURSE: HPI:  64F, ambulates independently at baseline, hx of CHF (last TTE on 1/16/25 EF 30-35%, G2DD), DM, diabetic foot ulcer with a recent admission at [facility] for CHF exacerbation 1/14-1/17, p/w worsening right leg swelling and pain. As per patient, from last admission, patient has noticed that right leg has continued to "secrete" fluids soaking and requiring drying every 2-3 hours. There is increased pain for which patient took Tylenol with no relief and she is unable to ambulate now. She endorses improvement in her left leg and can bear weight on that with no issues. She denies having any history of fever, redness, hot to touch of the right leg since her previous admission. Patient endorses her home medication of furosemide 40 daily is not helping and describes her urine output to be "decreased" and prefers the IV lasix. She continues to endorse orthopnea and shortness of breath w/ exertion. Inquiry about if swelling has progressed to her stomach, she denies saying it is mid-thigh bilaterally only. When inquired about her home CHF medications, she endorses that the Entresto has not been approved by insurance yet so she is currently not taking it.  (23 Jan 2025 16:21)      INTERIM EVENTS:Patient seen at bedside ,interim events noted.      PMH -reviewed admission note, no change since admission  HEART FAILURE: Acute[ ]Chronic[ ] Systolic[ ] Diastolic[ ] Combined Systolic and Diastolic[ ]  CAD[ ] CABG[ ] PCI[ ]  DEVICES[ ] PPM[ ] ICD[ ] ILR[ ]  ATRIAL FIBRILLATION[ ] Paroxysmal[ ] Permanent[ ] CHADS2-[  ]  JAMEL[ ] CKD1[ ] CKD2[ ] CKD3[ ] CKD4[ ] ESRD[ ]  COPD[ ] HTN[ ]   DM[ ] Type1[ ] Type 2[ ]   CVA[ ] Paresis[ ]    AMBULATION: Assisted[ ] Cane/walker[ ] Independent[ ]    MEDICATIONS  (STANDING):  ampicillin/sulbactam  IVPB 3 Gram(s) IV Intermittent every 6 hours  aspirin  chewable 81 milliGRAM(s) Oral daily  atorvastatin 40 milliGRAM(s) Oral at bedtime  heparin   Injectable 6500 Unit(s) IV Push once  heparin  Infusion.  Unit(s)/Hr (14 mL/Hr) IV Continuous <Continuous>  insulin glargine Injectable (LANTUS) 20 Unit(s) SubCutaneous at bedtime  insulin lispro (ADMELOG) corrective regimen sliding scale   SubCutaneous three times a day before meals  insulin lispro (ADMELOG) corrective regimen sliding scale   SubCutaneous at bedtime  lactated ringers Bolus 1000 milliLiter(s) IV Bolus once  metoprolol succinate ER 25 milliGRAM(s) Oral daily  polyethylene glycol 3350 17 Gram(s) Oral daily  senna 2 Tablet(s) Oral at bedtime    MEDICATIONS  (PRN):  acetaminophen     Tablet .. 1000 milliGRAM(s) Oral every 8 hours PRN Moderate Pain (4 - 6)  heparin   Injectable 6500 Unit(s) IV Push every 6 hours PRN For aPTT less than 40  heparin   Injectable 3000 Unit(s) IV Push every 6 hours PRN For aPTT between 40 - 57  melatonin 3 milliGRAM(s) Oral at bedtime PRN Insomnia  ondansetron Injectable 4 milliGRAM(s) IV Push every 8 hours PRN Nausea and/or Vomiting  oxyCODONE    IR 5 milliGRAM(s) Oral every 4 hours PRN Severe Pain (7 - 10)            REVIEW OF SYSTEMS:  Constitutional: [ ] fever, [ ]weight loss,  [ ]fatigue [ ]weight gain  Eyes: [ ] visual changes  Respiratory: [ ]shortness of breath;  [ ] cough, [ ]wheezing, [ ]chills, [ ]hemoptysis  Cardiovascular: [ ] chest pain, [ ]palpitations, [ ]dizziness,  [ ]leg swelling[ ]orthopnea[ ]PND  Gastrointestinal: [ ] abdominal pain, [ ]nausea, [ ]vomiting,  [ ]diarrhea [ ]Constipation [ ]Melena  Genitourinary: [ ] dysuria, [ ] hematuria [ ][place]  Neurologic: [ ] headaches [ ] tremors[ ]weakness [ ]Paralysis Right[ ] Left[ ]  Skin: [ ] itching, [ ]burning, [ ] rashes  Endocrine: [ ] heat or cold intolerance  Musculoskeletal: [ ] joint pain or swelling; [ ] muscle, back, or extremity pain  Psychiatric: [ ] depression, [ ]anxiety, [ ]mood swings, or [ ]difficulty sleeping  Hematologic: [ ] easy bruising, [ ] bleeding gums    [ ] All remaining systems negative except as per above.   [ ]Unable to obtain.  [x] No change in ROS since admission      Vital Signs Last 24 Hrs  T(C): 37.3 (27 Jan 2025 21:43), Max: 37.3 (27 Jan 2025 21:43)  T(F): 99.1 (27 Jan 2025 21:43), Max: 99.1 (27 Jan 2025 21:43)  HR: 90 (27 Jan 2025 21:43) (76 - 90)  BP: 104/59 (27 Jan 2025 21:43) (95/58 - 109/59)  BP(mean): 74 (27 Jan 2025 11:16) (68 - 74)  RR: 18 (27 Jan 2025 21:43) (17 - 18)  SpO2: 99% (27 Jan 2025 21:43) (92% - 99%)    Parameters below as of 27 Jan 2025 21:43  Patient On (Oxygen Delivery Method): room air      I&O's Summary    26 Jan 2025 07:01  -  27 Jan 2025 07:00  --------------------------------------------------------  IN: 670 mL / OUT: 650 mL / NET: 20 mL    27 Jan 2025 07:01  -  27 Jan 2025 22:01  --------------------------------------------------------  IN: 880 mL / OUT: 0 mL / NET: 880 mL        PHYSICAL EXAM:  General: No acute distress BMI-  HEENT: EOMI, PERRL  Neck: Supple, [ ] JVD  Lungs: Equal air entry bilaterally; [ ] rales [ ] wheezing [ ] rhonchi  Heart: Regular rate and rhythm; [x ] murmur   2/6 [ x] systolic [ ] diastolic [ ] radiation[ ] rubs [ ]  gallops  Abdomen: Nontender, bowel sounds present  Extremities: No clubbing, cyanosis, [ ] edema [ ]Pulses  equal and intact  Nervous system:  Alert & Oriented X3, no focal deficits  Psychiatric: Normal affect  Skin: No rashes or lesions    LABS:  01-27    138  |  102  |  56[H]  ----------------------------<  132[H]  3.9   |  26  |  1.55[H]    Ca    8.5      27 Jan 2025 06:55    TPro  7.1  /  Alb  2.5[L]  /  TBili  0.5  /  DBili  x   /  AST  25  /  ALT  29  /  AlkPhos  77  01-27    Creatinine Trend: 1.55<--, 1.25<--, 1.25<--, 1.17<--, 1.11<--, 1.00<--                        9.7    12.40 )-----------( 375      ( 27 Jan 2025 06:55 )             27.0       < from: TTE W or WO Ultrasound Enhancing Agent (01.16.25 @ 12:45) >  CONCLUSIONS:      1. Left ventricular systolic function is moderately decreased with an ejection fraction visually estimated at 30 to 35 %.   2. There is moderate (grade 2) left ventricular diastolic dysfunction.   3. Mild mitral regurgitation.   4. Trace tricuspid regurgitation.   5. Trace pulmonic regurgitation.   6. Trace pericardial effusion.   7. Right pleural effusion noted.   8. No prior echocardiogram is available for comparison.    < end of copied text >

## 2025-01-27 NOTE — CONSULT NOTE ADULT - ASSESSMENT
Patient is a 65yo Female with CHF (last TTE on 1/16/25 EF 30-35%, G2DD), DM, diabetic foot ulcer with a recent admission at [facility] for CHF exacerbation 1/14-1/17 p/w RLE swelling/ blister with pain. Pt a/w RLE soft tissue infection, and  diuresed for CHF. Pt found to have cool RLE and is pending CTA aorta with runoff. Nephrology consulted for Elevated serum creatinine.           hold diuresis/ entresto due to jenny. plan for ct with iv contrast once jenny resolves.   will need diuresis post ct with iv contrast due to fluid overload   Patient is a 63yo Female with CHF (last TTE on 1/16/25 EF 30-35%, G2DD), DM, diabetic foot ulcer with a recent admission at [facility] for CHF exacerbation 1/14-1/17 p/w RLE swelling/ blister with pain. Pt a/w RLE soft tissue infection, and  diuresed for CHF. Pt found to have cool RLE and is pending CTA aorta with runoff. Nephrology consulted for Elevated serum creatinine.      1. JAMEL- alvaro SCr 0.7-1; JAMEL in the setting of ampicillin/ sulbactam use with Bumex/ Entresto. Entresto and Bumex d/c today 1/27. Check UA and FeUrea. Check Renal US to r/o obstruction.   Recc to hold off on CTA until renal function improves. will give Mucomyst 1200mg PO bid x 4 doses (2 doses pre contrast and 2 doses post contrast) and hold diuretics  to minimize risk of LILIAN.  Strict I/Os. Avoid nephrotoxins/ NSAIDs/ RCA. Monitor BMP.    2. Essential HTN-low normal BP. c/w Metoprolol ER 25 PO daily. Entresto held due to JAMEL. c/w low salt diet. Monitor BP    3. CHF with fluid overload- once renal function improves and is stable post CTA; recc aggressive diuresis. Strict I/Os. Daily weights    4. Skin/ soft tissue infection- Lower extremities. Pt on ampicillin/ sulbactam; which can also cause JAMEL. Plan as per [facility] NEPHROLOGY  Raji Waterman M.D.  Mars Feliz D.O.  Kelley Parks M.D.  MD Nancy Kulkarni, MSN, ANP-C    Telephone: [phone]  Facsimile: [phone] [place] [address], [address]  [place] [address]

## 2025-01-27 NOTE — PROGRESS NOTE ADULT - ASSESSMENT
64F, hx of CHF (last TTE on 1/16/25 EF 30-35%, G2DD), DM, diabetic foot ulcer with a recent admission at [facility] for CHF exacerbation 1/14-1/17 p/w worsening R. leg pain and fluid secretion, was meeting sepsis criteria and admitted for continued management of CHF exacerbation      # SIRS (systemic inflammatory response syndrome).   # HF   # CAD  abx per Primary team  -  Metoprolol Succ 25 qD. Not on Entresto due to insurance issues  -c/w Metoprolol Succ 25 qD  -Resume Entresto 24/26 BID  -aspirin 81 qD  -Atorvastatin 40  - CONT BYMEX    - - Nuclear stress tesT  - NOT to be ischemic, no cardiac cath indicated at this time.

## 2025-01-27 NOTE — PROGRESS NOTE ADULT - SUBJECTIVE AND OBJECTIVE BOX
SUBJECTIVE: **TO UPDATE**  No overnight events. Pt seen at bedside, states that they feel "__________." Pt endorses _____. Pt denies headache, fever, chills, SOB, chest pain, cough, abd pain, n/v/d, constipation, dysuria, urinary frequency, back pain, myalgias, weakness, dizziness, gait disturbance, numbness/tingling, blurry vision.      OBJECTIVE:    T(C): 36.8 (01-27-25 @ 07:34), Max: 37 (01-26-25 @ 23:20)  HR: 78 (01-27-25 @ 07:34) (76 - 96)  BP: 102/51 (01-27-25 @ 07:34) (95/58 - 117/64)  RR: 18 (01-27-25 @ 07:34) (18 - 18)  SpO2: 96% (01-27-25 @ 07:34) (92% - 97%)        01-26-25 @ 07:01  -  01-27-25 @ 07:00  --------------------------------------------------------  IN: 670 mL / OUT: 650 mL / NET: 20 mL          ***TO BE EDITED***  CONSTITUTIONAL: No apparent distress, resting comfortably  EYES: Pupils symmetric, EOMI, No conjunctival or scleral injection, non-icteric  ENMT: Oral mucosa with moist membranes  RESP: No respiratory distress, no use of accessory muscles; CTA b/l, no crackles or wheezes  CV: RRR, +S1S2, no murmurs appreciated; no peripheral edema  GI: Soft, NTND, no rebound, no guarding  MSK: No digital clubbing or cyanosis; Extremities moving equally without additional effort; gait not appreciated  : deferred  SKIN: No rashes or ulcers noted  NEURO: CN II-XII grossly intact   PSYCH: Appropriate insight/judgment; A+O x 3, mood and affect appropriate, recent/remote memory intact    No Known Allergies      acetaminophen     Tablet .. 1000 milliGRAM(s) Oral every 12 hours  ampicillin/sulbactam  IVPB 3 Gram(s) IV Intermittent every 6 hours  aspirin  chewable 81 milliGRAM(s) Oral daily  atorvastatin 40 milliGRAM(s) Oral at bedtime  buMETAnide 1 milliGRAM(s) Oral every 12 hours  enoxaparin Injectable 40 milliGRAM(s) SubCutaneous every 24 hours  gabapentin 100 milliGRAM(s) Oral at bedtime  insulin glargine Injectable (LANTUS) 20 Unit(s) SubCutaneous at bedtime  insulin lispro (ADMELOG) corrective regimen sliding scale   SubCutaneous three times a day before meals  insulin lispro (ADMELOG) corrective regimen sliding scale   SubCutaneous at bedtime  melatonin 3 milliGRAM(s) Oral at bedtime PRN  metoprolol succinate ER 25 milliGRAM(s) Oral daily  ondansetron Injectable 4 milliGRAM(s) IV Push every 8 hours PRN  oxyCODONE    IR 2.5 milliGRAM(s) Oral every 4 hours PRN  oxyCODONE    IR 5 milliGRAM(s) Oral every 4 hours PRN  sacubitril 24 mG/valsartan 26 mG 1 Tablet(s) Oral two times a day      27.0                       SUBJECTIVE:   No overnight events. Pt seen at bedside, states that they feel "alright today." Pt endorses mild orthopnea, b/l leg discomfort/pressure and tingling. Pt states that her leg pain associated with sloughed blisters has improved with wrappings and pain regimen. Pt denies headache, fever, chills, SOB, chest pain, cough, abd pain, weakness, dizziness.      OBJECTIVE:    T(C): 36.8 (01-27-25 @ 07:34), Max: 37 (01-26-25 @ 23:20)  HR: 78 (01-27-25 @ 07:34) (76 - 96)  BP: 102/51 (01-27-25 @ 07:34) (95/58 - 117/64)  RR: 18 (01-27-25 @ 07:34) (18 - 18)  SpO2: 96% (01-27-25 @ 07:34) (92% - 97%)        01-26-25 @ 07:01  -  01-27-25 @ 07:00  --------------------------------------------------------  IN: 670 mL / OUT: 650 mL / NET: 20 mL          CONSTITUTIONAL: No apparent distress, resting comfortably  EYES: Pupils symmetric, EOMI, No conjunctival or scleral injection, non-icteric  ENMT: Oral mucosa with moist membranes  RESP: b/l basilar rhonchi; No respiratory distress, no use of accessory muscles; no wheezes  CV: RRR, +S1S2, no murmurs appreciated; 3+ peripheral edema to mid-thigh  GI: Soft, NTND, no rebound, no guarding  MSK: No digital clubbing or cyanosis; Extremities moving equally without additional effort; gait not appreciated  : deferred  SKIN: b/l claves wrapped; L great toe with discoloration, ulceration at tip  NEURO: CN II-XII grossly intact   PSYCH: Appropriate insight/judgment; A+O x 3, mood and affect appropriate, recent/remote memory intact    No Known Allergies      acetaminophen     Tablet .. 1000 milliGRAM(s) Oral every 12 hours  ampicillin/sulbactam  IVPB 3 Gram(s) IV Intermittent every 6 hours  aspirin  chewable 81 milliGRAM(s) Oral daily  atorvastatin 40 milliGRAM(s) Oral at bedtime  buMETAnide 1 milliGRAM(s) Oral every 12 hours  enoxaparin Injectable 40 milliGRAM(s) SubCutaneous every 24 hours  gabapentin 100 milliGRAM(s) Oral at bedtime  insulin glargine Injectable (LANTUS) 20 Unit(s) SubCutaneous at bedtime  insulin lispro (ADMELOG) corrective regimen sliding scale   SubCutaneous three times a day before meals  insulin lispro (ADMELOG) corrective regimen sliding scale   SubCutaneous at bedtime  melatonin 3 milliGRAM(s) Oral at bedtime PRN  metoprolol succinate ER 25 milliGRAM(s) Oral daily  ondansetron Injectable 4 milliGRAM(s) IV Push every 8 hours PRN  oxyCODONE    IR 2.5 milliGRAM(s) Oral every 4 hours PRN  oxyCODONE    IR 5 milliGRAM(s) Oral every 4 hours PRN  sacubitril 24 mG/valsartan 26 mG 1 Tablet(s) Oral two times a day                            9.7    12.40 )-----------( 375      ( 27 Jan 2025 06:55 )             27.0     01-27    138  |  102  |  56[H]  ----------------------------<  132[H]  3.9   |  26  |  1.55[H]    Ca    8.5      27 Jan 2025 06:55  Phos  3.5     01-25  Mg     2.1     01-25    TPro  7.1  /  Alb  2.5[L]  /  TBili  0.5  /  DBili  x   /  AST  25  /  ALT  29  /  AlkPhos  77  01-27    Protein/Creatinine Ratio, Urine (01.27.25 @ 13:10)   Total Protein, Random Urine: 25 mg/dL  Protein/Creatinine Ratio Calculation: 0.2 Ratio  Creatinine, Random Urine: 122    < from: CT Chest No Cont (01.25.25 @ 14:08) >  IMPRESSION: Small bilateral pleural effusions and small pericardial   effusion.    --- End of Report ---      < end of copied text >

## 2025-01-27 NOTE — PROGRESS NOTE ADULT - ASSESSMENT
A:   B/l LE serous bullae secondary to fluid overload  R hallux dry gangrene    P:   Patient evaluated, chart reviewed and updated   Xrays reviewed - No gas, no OM, no fractures, no abscess  Discussed diagnosis and treatment plans with the patient  Explained to patient that pain is likely from fluid overload and exposed dermis from lanced serous bullae  Vascular team also following patient as she has coldness to the RLE with severe pain and dry ischemic changes to the left hallux. Scheduled for R CTA today.  Applied xeroform, DSD, ACE  WCO placed for dressing changes  Patient can WBAT to bilateral LEs  Discussed importance of daily foot examinations, proper shoe gear, and tight glycemic control.   Patient is stable from podiatry's standpoint. No podiatric surgical intervention required at this time  Upon discharge, patient may follow up outpatient with Dr. Steve Palma at [phone] [facility], [address], [place] [address]. Please call [phone] to make an appointment within 1 week.   Podiatry to follow while in house  Discussed with attending Dr. Palma    WCO: xeroform, 4x4 gauze, abd pad, maru, and ACE to be applied to b/l LE 3 times a week or as necessary if dressings are saturated.

## 2025-01-27 NOTE — PROGRESS NOTE ADULT - SUBJECTIVE AND OBJECTIVE BOX
Subjective/Objective: Appreciate vascular evaluation      MEDS  acetaminophen     Tablet .. 1000 milliGRAM(s) Oral every 8 hours PRN  ampicillin/sulbactam  IVPB 3 Gram(s) IV Intermittent every 6 hours (D3)  aspirin  chewable 81 milliGRAM(s) Oral daily  atorvastatin 40 milliGRAM(s) Oral at bedtime  buMETAnide 1 milliGRAM(s) Oral every 12 hours  enoxaparin Injectable 40 milliGRAM(s) SubCutaneous every 24 hours  gabapentin 100 milliGRAM(s) Oral at bedtime  insulin glargine Injectable (LANTUS) 20 Unit(s) SubCutaneous at bedtime  insulin lispro (ADMELOG) corrective regimen sliding scale   SubCutaneous three times a day before meals  insulin lispro (ADMELOG) corrective regimen sliding scale   SubCutaneous at bedtime  melatonin 3 milliGRAM(s) Oral at bedtime PRN  metoprolol succinate ER 25 milliGRAM(s) Oral daily  ondansetron Injectable 4 milliGRAM(s) IV Push every 8 hours PRN  oxyCODONE    IR 5 milliGRAM(s) Oral every 4 hours PRN  polyethylene glycol 3350 17 Gram(s) Oral daily  sacubitril 24 mG/valsartan 26 mG 1 Tablet(s) Oral two times a day  senna 2 Tablet(s) Oral at bedtime      PHYSICAL EXAM:    Vital Signs Last 24 Hrs  T(C): 36.8 (27 Jan 2025 07:34), Max: 37 (26 Jan 2025 23:20)  T(F): 98.2 (27 Jan 2025 07:34), Max: 98.6 (26 Jan 2025 23:20)  HR: 78 (27 Jan 2025 07:34) (76 - 96)  BP: 102/51 (27 Jan 2025 07:34) (95/58 - 117/64)  BP(mean): 68 (27 Jan 2025 07:34) (68 - 73)  RR: 18 (27 Jan 2025 07:34) (18 - 18)  SpO2: 96% (27 Jan 2025 07:34) (92% - 97%)    Parameters below as of 27 Jan 2025 07:34  Patient On (Oxygen Delivery Method): room air        GEN:    HEENT:    CHEST/Respiratory:    Cardiovascular:    Abdomen:    Genitourinary:    Extremities:     Neurological:    Skin:      LABS/DIAGNOSTIC TESTS                            9.7    12.40 )-----------( 375      ( 27 Jan 2025 06:55 )             27.0       WBC Count: 12.40 K/uL (01-27 @ 06:55)  WBC Count: 13.81 K/uL (01-25 @ 17:44)  WBC Count: 14.26 K/uL (01-25 @ 06:57)      01-27    138  |  102  |  56[H]  ----------------------------<  132[H]  3.9   |  26  |  1.55[H]    Ca    8.5      27 Jan 2025 06:55  Phos  3.5     01-25  Mg     2.1     01-25    TPro  7.1  /  Alb  2.5[L]  /  TBili  0.5  /  DBili  x   /  AST  25  /  ALT  29  /  AlkPhos  77  01-27        CULTURES      Culture - Urine (collected 01-23-25 @ 14:12)  Source: Clean Catch  Final Report (01-24-25 @ 23:10):    >=3 organisms. Probable collection contamination.    Urinalysis with Rflx Culture (collected 01-23-25 @ 14:12)    Culture - Blood (collected 01-23-25 @ 10:36)  Source: .Blood BLOOD  Preliminary Report (01-26-25 @ 20:00):    No growth at 72 Hours    Culture - Blood (collected 01-23-25 @ 10:30)  Source: .Blood BLOOD  Preliminary Report (01-26-25 @ 20:00):    No growth at 72 Hours          RADIOLOGY                 Subjective/Objective: Appreciate vascular evaluation. Pt with decreased pain in RLE.      MEDS  acetaminophen     Tablet .. 1000 milliGRAM(s) Oral every 8 hours PRN  ampicillin/sulbactam  IVPB 3 Gram(s) IV Intermittent every 6 hours (D3)  aspirin  chewable 81 milliGRAM(s) Oral daily  atorvastatin 40 milliGRAM(s) Oral at bedtime  buMETAnide 1 milliGRAM(s) Oral every 12 hours  enoxaparin Injectable 40 milliGRAM(s) SubCutaneous every 24 hours  gabapentin 100 milliGRAM(s) Oral at bedtime  insulin glargine Injectable (LANTUS) 20 Unit(s) SubCutaneous at bedtime  insulin lispro (ADMELOG) corrective regimen sliding scale   SubCutaneous three times a day before meals  insulin lispro (ADMELOG) corrective regimen sliding scale   SubCutaneous at bedtime  melatonin 3 milliGRAM(s) Oral at bedtime PRN  metoprolol succinate ER 25 milliGRAM(s) Oral daily  ondansetron Injectable 4 milliGRAM(s) IV Push every 8 hours PRN  oxyCODONE    IR 5 milliGRAM(s) Oral every 4 hours PRN  polyethylene glycol 3350 17 Gram(s) Oral daily  sacubitril 24 mG/valsartan 26 mG 1 Tablet(s) Oral two times a day  senna 2 Tablet(s) Oral at bedtime      PHYSICAL EXAM:    Vital Signs Last 24 Hrs  T(C): 36.8 (27 Jan 2025 07:34), Max: 37 (26 Jan 2025 23:20)  T(F): 98.2 (27 Jan 2025 07:34), Max: 98.6 (26 Jan 2025 23:20)  HR: 78 (27 Jan 2025 07:34) (76 - 96)  BP: 102/51 (27 Jan 2025 07:34) (95/58 - 117/64)  BP(mean): 68 (27 Jan 2025 07:34) (68 - 73)  RR: 18 (27 Jan 2025 07:34) (18 - 18)  SpO2: 96% (27 Jan 2025 07:34) (92% - 97%)    Parameters below as of 27 Jan 2025 07:34  Patient On (Oxygen Delivery Method): room air      Gen: obese female in NAD    HEENT: NC/AT; conj. clear; mouth--fair oral hygiene    Neck: supple    Chest/Thorax: clear to auscultation    Cardiovascular: S1S2 reg; cd not appreciate any m, g, r    ABD: BS active; soft and non-tender to palpation    Genitourinary: no stiles in place    Extremities: onychomycosis of toenails bilat  LLE: dressing over distal leg and foot  RLE: dressing over distal leg and foot; cool to touch proximal to dressing    Neurological: A+ox3    Skin: see above    Psychiatric: affect appropriate      LABS/DIAGNOSTIC TESTS                            9.7    12.40 )-----------( 375      ( 27 Jan 2025 06:55 )             27.0       WBC Count: 12.40 K/uL (01-27 @ 06:55)  WBC Count: 13.81 K/uL (01-25 @ 17:44)  WBC Count: 14.26 K/uL (01-25 @ 06:57)      01-27    138  |  102  |  56[H]  ----------------------------<  132[H]  3.9   |  26  |  1.55[H]    Ca    8.5      27 Jan 2025 06:55  Phos  3.5     01-25  Mg     2.1     01-25    TPro  7.1  /  Alb  2.5[L]  /  TBili  0.5  /  DBili  x   /  AST  25  /  ALT  29  /  AlkPhos  77  01-27        CULTURES      Culture - Urine (collected 01-23-25 @ 14:12)  Source: Clean Catch  Final Report (01-24-25 @ 23:10):    >=3 organisms. Probable collection contamination.    Urinalysis with Rflx Culture (collected 01-23-25 @ 14:12)    Culture - Blood (collected 01-23-25 @ 10:36)  Source: .Blood BLOOD  Preliminary Report (01-26-25 @ 20:00):    No growth at 72 Hours    Culture - Blood (collected 01-23-25 @ 10:30)  Source: .Blood BLOOD  Preliminary Report (01-26-25 @ 20:00):    No growth at 72 Hours          RADIOLOGY

## 2025-01-28 ENCOUNTER — TRANSCRIPTION ENCOUNTER (OUTPATIENT)
Age: 65
End: 2025-01-28

## 2025-01-28 DIAGNOSIS — I50.9 HEART FAILURE, UNSPECIFIED: ICD-10-CM

## 2025-01-28 LAB
ANION GAP SERPL CALC-SCNC: 11 MMOL/L — SIGNIFICANT CHANGE UP (ref 5–17)
ANISOCYTOSIS BLD QL: SLIGHT — SIGNIFICANT CHANGE UP
APTT BLD: 83.9 SEC — HIGH (ref 24.5–35.6)
APTT BLD: 87.9 SEC — HIGH (ref 24.5–35.6)
BASOPHILS # BLD AUTO: 0.08 K/UL — SIGNIFICANT CHANGE UP (ref 0–0.2)
BASOPHILS NFR BLD AUTO: 0.6 % — SIGNIFICANT CHANGE UP (ref 0–2)
BUN SERPL-MCNC: 63 MG/DL — HIGH (ref 7–18)
CALCIUM SERPL-MCNC: 8.3 MG/DL — LOW (ref 8.4–10.5)
CALCIUM UR-MCNC: 1 MG/DL — SIGNIFICANT CHANGE UP
CHLORIDE SERPL-SCNC: 100 MMOL/L — SIGNIFICANT CHANGE UP (ref 96–108)
CO2 SERPL-SCNC: 24 MMOL/L — SIGNIFICANT CHANGE UP (ref 22–31)
CREAT SERPL-MCNC: 2.36 MG/DL — HIGH (ref 0.5–1.3)
CULTURE RESULTS: SIGNIFICANT CHANGE UP
CULTURE RESULTS: SIGNIFICANT CHANGE UP
EGFR: 23 ML/MIN/1.73M2 — LOW
EOSINOPHIL # BLD AUTO: 0.48 K/UL — SIGNIFICANT CHANGE UP (ref 0–0.5)
EOSINOPHIL NFR BLD AUTO: 3.7 % — SIGNIFICANT CHANGE UP (ref 0–6)
GLUCOSE BLDC GLUCOMTR-MCNC: 118 MG/DL — HIGH (ref 70–99)
GLUCOSE BLDC GLUCOMTR-MCNC: 125 MG/DL — HIGH (ref 70–99)
GLUCOSE BLDC GLUCOMTR-MCNC: 132 MG/DL — HIGH (ref 70–99)
GLUCOSE BLDC GLUCOMTR-MCNC: 184 MG/DL — HIGH (ref 70–99)
GLUCOSE SERPL-MCNC: 115 MG/DL — HIGH (ref 70–99)
HCT VFR BLD CALC: 26.5 % — LOW (ref 34.5–45)
HCT VFR BLD CALC: 27.5 % — LOW (ref 34.5–45)
HGB BLD-MCNC: 9.2 G/DL — LOW (ref 11.5–15.5)
HGB BLD-MCNC: 9.7 G/DL — LOW (ref 11.5–15.5)
HYPOCHROMIA BLD QL: SLIGHT — SIGNIFICANT CHANGE UP
IMM GRANULOCYTES NFR BLD AUTO: 0.3 % — SIGNIFICANT CHANGE UP (ref 0–0.9)
LYMPHOCYTES # BLD AUTO: 26.1 % — SIGNIFICANT CHANGE UP (ref 13–44)
LYMPHOCYTES # BLD AUTO: 3.4 K/UL — HIGH (ref 1–3.3)
MAGNESIUM SERPL-MCNC: 2.3 MG/DL — SIGNIFICANT CHANGE UP (ref 1.6–2.6)
MANUAL SMEAR VERIFICATION: SIGNIFICANT CHANGE UP
MCHC RBC-ENTMCNC: 25.1 PG — LOW (ref 27–34)
MCHC RBC-ENTMCNC: 25.3 PG — LOW (ref 27–34)
MCHC RBC-ENTMCNC: 34.7 G/DL — SIGNIFICANT CHANGE UP (ref 32–36)
MCHC RBC-ENTMCNC: 35.3 G/DL — SIGNIFICANT CHANGE UP (ref 32–36)
MCV RBC AUTO: 71.2 FL — LOW (ref 80–100)
MCV RBC AUTO: 72.8 FL — LOW (ref 80–100)
MICROCYTES BLD QL: SLIGHT — SIGNIFICANT CHANGE UP
MONOCYTES # BLD AUTO: 0.98 K/UL — HIGH (ref 0–0.9)
MONOCYTES NFR BLD AUTO: 7.5 % — SIGNIFICANT CHANGE UP (ref 2–14)
NEUTROPHILS # BLD AUTO: 8.07 K/UL — HIGH (ref 1.8–7.4)
NEUTROPHILS NFR BLD AUTO: 61.8 % — SIGNIFICANT CHANGE UP (ref 43–77)
NRBC # BLD: 0 /100 WBCS — SIGNIFICANT CHANGE UP (ref 0–0)
NRBC # BLD: 0 /100 WBCS — SIGNIFICANT CHANGE UP (ref 0–0)
NRBC BLD-RTO: 0 /100 WBCS — SIGNIFICANT CHANGE UP (ref 0–0)
NRBC BLD-RTO: 0 /100 WBCS — SIGNIFICANT CHANGE UP (ref 0–0)
PHOSPHATE SERPL-MCNC: 4.7 MG/DL — HIGH (ref 2.5–4.5)
PLAT MORPH BLD: NORMAL — SIGNIFICANT CHANGE UP
PLATELET # BLD AUTO: 398 K/UL — SIGNIFICANT CHANGE UP (ref 150–400)
PLATELET # BLD AUTO: 404 K/UL — HIGH (ref 150–400)
PLATELET COUNT - ESTIMATE: ABNORMAL
POIKILOCYTOSIS BLD QL AUTO: SLIGHT — SIGNIFICANT CHANGE UP
POTASSIUM SERPL-MCNC: 4.5 MMOL/L — SIGNIFICANT CHANGE UP (ref 3.5–5.3)
POTASSIUM SERPL-SCNC: 4.5 MMOL/L — SIGNIFICANT CHANGE UP (ref 3.5–5.3)
RBC # BLD: 3.64 M/UL — LOW (ref 3.8–5.2)
RBC # BLD: 3.86 M/UL — SIGNIFICANT CHANGE UP (ref 3.8–5.2)
RBC # FLD: 15 % — HIGH (ref 10.3–14.5)
RBC # FLD: 15.4 % — HIGH (ref 10.3–14.5)
RBC BLD AUTO: ABNORMAL
SODIUM SERPL-SCNC: 135 MMOL/L — SIGNIFICANT CHANGE UP (ref 135–145)
SPECIMEN SOURCE: SIGNIFICANT CHANGE UP
SPECIMEN SOURCE: SIGNIFICANT CHANGE UP
TARGETS BLD QL SMEAR: SLIGHT — SIGNIFICANT CHANGE UP
WBC # BLD: 12.89 K/UL — HIGH (ref 3.8–10.5)
WBC # BLD: 13.05 K/UL — HIGH (ref 3.8–10.5)
WBC # FLD AUTO: 12.89 K/UL — HIGH (ref 3.8–10.5)
WBC # FLD AUTO: 13.05 K/UL — HIGH (ref 3.8–10.5)

## 2025-01-28 PROCEDURE — 99233 SBSQ HOSP IP/OBS HIGH 50: CPT | Mod: GC

## 2025-01-28 PROCEDURE — G0545: CPT

## 2025-01-28 PROCEDURE — 99232 SBSQ HOSP IP/OBS MODERATE 35: CPT

## 2025-01-28 RX ORDER — HEPARIN SODIUM,PORCINE 10000/ML
0 VIAL (ML) INJECTION
Qty: 0 | Refills: 0 | DISCHARGE
Start: 2025-01-28

## 2025-01-28 RX ORDER — AMOXICILLIN AND CLAVULANATE POTASSIUM 200; 28.5 MG/5ML; MG/5ML
1 POWDER, FOR SUSPENSION ORAL EVERY 12 HOURS
Refills: 0 | Status: DISCONTINUED | OUTPATIENT
Start: 2025-01-29 | End: 2025-01-29

## 2025-01-28 RX ORDER — AMPICILLIN SODIUM AND SULBACTAM SODIUM 2; 1 G/1; G/1
0 INJECTION, POWDER, FOR SOLUTION INTRAMUSCULAR; INTRAVENOUS
Qty: 0 | Refills: 0 | DISCHARGE
Start: 2025-01-28

## 2025-01-28 RX ORDER — DOBUTAMINE HCL 250MG/20ML
2.5 VIAL (ML) INTRAVENOUS
Qty: 500 | Refills: 0 | Status: DISCONTINUED | OUTPATIENT
Start: 2025-01-28 | End: 2025-01-29

## 2025-01-28 RX ORDER — DOBUTAMINE HCL 250MG/20ML
2.5 VIAL (ML) INTRAVENOUS
Qty: 1000 | Refills: 0 | Status: DISCONTINUED | OUTPATIENT
Start: 2025-01-28 | End: 2025-01-28

## 2025-01-28 RX ORDER — AMPICILLIN SODIUM AND SULBACTAM SODIUM 2; 1 G/1; G/1
3 INJECTION, POWDER, FOR SOLUTION INTRAMUSCULAR; INTRAVENOUS EVERY 12 HOURS
Refills: 0 | Status: DISCONTINUED | OUTPATIENT
Start: 2025-01-28 | End: 2025-01-28

## 2025-01-28 RX ORDER — OXYCODONE HYDROCHLORIDE 30 MG/1
5 TABLET ORAL EVERY 6 HOURS
Refills: 0 | Status: DISCONTINUED | OUTPATIENT
Start: 2025-01-28 | End: 2025-01-29

## 2025-01-28 RX ORDER — ATORVASTATIN CALCIUM 80 MG/1
1 TABLET, FILM COATED ORAL
Qty: 0 | Refills: 0 | DISCHARGE
Start: 2025-01-28

## 2025-01-28 RX ORDER — ASPIRIN 81 MG/1
1 TABLET, COATED ORAL
Qty: 0 | Refills: 0 | DISCHARGE
Start: 2025-01-28

## 2025-01-28 RX ORDER — METOPROLOL SUCCINATE 25 MG
1 TABLET, EXTENDED RELEASE 24 HR ORAL
Qty: 0 | Refills: 0 | DISCHARGE
Start: 2025-01-28

## 2025-01-28 RX ORDER — ACETAMINOPHEN, DIPHENHYDRAMINE HCL, PHENYLEPHRINE HCL 325; 25; 5 MG/1; MG/1; MG/1
1 TABLET ORAL
Qty: 0 | Refills: 0 | DISCHARGE
Start: 2025-01-28

## 2025-01-28 RX ORDER — DOBUTAMINE HCL 250MG/20ML
0 VIAL (ML) INTRAVENOUS
Qty: 0 | Refills: 0 | DISCHARGE
Start: 2025-01-28

## 2025-01-28 RX ORDER — ONDANSETRON 4 MG/1
4 TABLET, ORALLY DISINTEGRATING ORAL
Qty: 0 | Refills: 0 | DISCHARGE
Start: 2025-01-28

## 2025-01-28 RX ORDER — INSULIN GLARGINE-YFGN 100 [IU]/ML
20 INJECTION, SOLUTION SUBCUTANEOUS
Qty: 0 | Refills: 0 | DISCHARGE
Start: 2025-01-28

## 2025-01-28 RX ORDER — INSULIN DEGLUDEC INJECTION 100 U/ML
30 INJECTION, SOLUTION SUBCUTANEOUS
Refills: 0 | DISCHARGE

## 2025-01-28 RX ORDER — ACETAMINOPHEN 160 MG/5ML
2 SUSPENSION ORAL
Qty: 0 | Refills: 0 | DISCHARGE
Start: 2025-01-28

## 2025-01-28 RX ORDER — POLYETHYLENE GLYCOL 3350 17 G/17G
17 POWDER, FOR SOLUTION ORAL
Qty: 0 | Refills: 0 | DISCHARGE
Start: 2025-01-28

## 2025-01-28 RX ORDER — AMOXICILLIN AND CLAVULANATE POTASSIUM 200; 28.5 MG/5ML; MG/5ML
1 POWDER, FOR SUSPENSION ORAL
Qty: 0 | Refills: 0 | DISCHARGE
Start: 2025-01-28

## 2025-01-28 RX ORDER — INSULIN LISPRO 100/ML
0 VIAL (ML) SUBCUTANEOUS
Qty: 0 | Refills: 0 | DISCHARGE
Start: 2025-01-28

## 2025-01-28 RX ORDER — SENNOSIDES 8.6 MG
2 TABLET ORAL
Qty: 0 | Refills: 0 | DISCHARGE
Start: 2025-01-28

## 2025-01-28 RX ORDER — OXYCODONE HYDROCHLORIDE 30 MG/1
1 TABLET ORAL
Qty: 0 | Refills: 0 | DISCHARGE
Start: 2025-01-28

## 2025-01-28 RX ADMIN — ASPIRIN 81 MILLIGRAM(S): 81 TABLET, COATED ORAL at 11:36

## 2025-01-28 RX ADMIN — AMPICILLIN SODIUM AND SULBACTAM SODIUM 200 GRAM(S): 2; 1 INJECTION, POWDER, FOR SOLUTION INTRAMUSCULAR; INTRAVENOUS at 11:37

## 2025-01-28 RX ADMIN — AMPICILLIN SODIUM AND SULBACTAM SODIUM 200 GRAM(S): 2; 1 INJECTION, POWDER, FOR SOLUTION INTRAMUSCULAR; INTRAVENOUS at 02:33

## 2025-01-28 RX ADMIN — OXYCODONE HYDROCHLORIDE 5 MILLIGRAM(S): 30 TABLET ORAL at 02:30

## 2025-01-28 RX ADMIN — Medication 25 MILLIGRAM(S): at 06:27

## 2025-01-28 RX ADMIN — INSULIN GLARGINE-YFGN 20 UNIT(S): 100 INJECTION, SOLUTION SUBCUTANEOUS at 22:03

## 2025-01-28 RX ADMIN — Medication 5.87 MICROGRAM(S)/KG/MIN: at 14:14

## 2025-01-28 RX ADMIN — OXYCODONE HYDROCHLORIDE 5 MILLIGRAM(S): 30 TABLET ORAL at 15:00

## 2025-01-28 RX ADMIN — Medication 1400 UNIT(S)/HR: at 07:42

## 2025-01-28 RX ADMIN — AMPICILLIN SODIUM AND SULBACTAM SODIUM 200 GRAM(S): 2; 1 INJECTION, POWDER, FOR SOLUTION INTRAMUSCULAR; INTRAVENOUS at 06:28

## 2025-01-28 RX ADMIN — OXYCODONE HYDROCHLORIDE 5 MILLIGRAM(S): 30 TABLET ORAL at 14:14

## 2025-01-28 RX ADMIN — OXYCODONE HYDROCHLORIDE 5 MILLIGRAM(S): 30 TABLET ORAL at 03:00

## 2025-01-28 RX ADMIN — ATORVASTATIN CALCIUM 40 MILLIGRAM(S): 80 TABLET, FILM COATED ORAL at 22:03

## 2025-01-28 RX ADMIN — POLYETHYLENE GLYCOL 3350 17 GRAM(S): 17 POWDER, FOR SOLUTION ORAL at 11:37

## 2025-01-28 RX ADMIN — Medication 2 TABLET(S): at 22:03

## 2025-01-28 RX ADMIN — Medication 1400 UNIT(S)/HR: at 12:48

## 2025-01-28 RX ADMIN — Medication 1400 UNIT(S)/HR: at 05:14

## 2025-01-28 RX ADMIN — Medication 1400 UNIT(S)/HR: at 17:37

## 2025-01-28 RX ADMIN — Medication 1400 UNIT(S)/HR: at 19:45

## 2025-01-28 RX ADMIN — Medication 1: at 12:28

## 2025-01-28 NOTE — CONSULT NOTE ADULT - CONSULT REASON
Elevated serum creatinine.
Hf
Rt foot pain
RLE coolness
cellulitis
Right leg pain
Worsening right leg swelling and pain.

## 2025-01-28 NOTE — PROGRESS NOTE ADULT - TIME BILLING
- Review of records, telemetry, vital signs and daily labs.   - General and cardiovascular physical examination.  - Generation of cardiovascular treatment plan and completion of note .  - Coordination of care.      Patient was seen and examined by me on  1/26/25,interim events noted,labs and radiology studies reviewed.  Yordy Gilmore MD,FACC.  [address] [place], [place] [id number].  [phone]  Availabe to call or text on [employer] TEAMS.
- Review of records, telemetry, vital signs and daily labs.   - General and cardiovascular physical examination.  - Generation of cardiovascular treatment plan and completion of note .  - Coordination of care.      Patient was seen and examined by me on  1/24/25,interim events noted,labs and radiology studies reviewed.  Yordy Gilmore MD,FACC.  [address] [place], [place] [id number].  [phone]  Availabe to call or text on [employer] TEAMS.
- Review of records, telemetry, vital signs and daily labs.   - General and cardiovascular physical examination.  - Generation of cardiovascular treatment plan and completion of note .  - Coordination of care.      Patient was seen and examined by me on  1/27/25,interim events noted,labs and radiology studies reviewed.  Yordy Gilmore MD,FACC.  [address] [place], [place] [id number].  [phone]  Availabe to call or text on Microsoft TEAMS.
- Review of records, telemetry, vital signs and daily labs.   - General and cardiovascular physical examination.  - Generation of cardiovascular treatment plan and completion of note .  - Coordination of care.      Patient was seen and examined by me on  1/28/25,interim events noted,labs and radiology studies reviewed.  Yordy Gilmore MD,FACC.  [address] [place], [place] [id number].  [phone]  Availabe to call or text on Microsoft TEAMS.
- Review of records, telemetry, vital signs and daily labs.   - General and cardiovascular physical examination.  - Generation of cardiovascular treatment plan and completion of note .  - Coordination of care.      Patient was seen and examined by me on  1/25/25,interim events noted,labs and radiology studies reviewed.  Yordy Gilmore MD,FACC.  [address] [place], [place] [id number].  [phone]  Availabe to call or text on [employer] TEAMS.
Discussion with cardiology, review of stress, family discussion, formulation of plan, medical and medication management, documentation of encounter
Discussion with family, discussion with cardiology, review of new imaging results, new fever, discussion with ID attending, formulation of plan, medical and medication management, documentation of encounter
New acute medical developments, discussion with consultants, review of labs and imaging, ordering of further testing, formulation of plan, medical and medication management, documentation of encounter

## 2025-01-28 NOTE — CONSULT NOTE ADULT - SUBJECTIVE AND OBJECTIVE BOX
VASCULAR SURGERY CONSULT NOTE    chief complaint of R. Leg pain and swelling (27 Jan 2025 16:23)      HPI:  65 y/o f  ambulates independently at baseline, hx of CHF (last TTE on 1/16/25 EF 30-35%, G2DD), DM, diabetic foot ulcer with a recent admission at [facility] for CHF exacerbation 1/14-1/17, p/w worsening right leg swelling and pain. As per patient, from last admission, patient has noticed that right leg has continued to "secrete" fluids soaking and requiring drying every 2-3 hours. There is increased pain for which patient took Tylenol with no relief and she is unable to ambulate now. She endorses improvement in her left leg and can bear weight on that with no issues. She denies having any history of fever, redness, hot to touch of the right leg since her previous admission. Patient endorses her home medication of furosemide 40 daily is not helping and describes her urine output to be "decreased" and prefers the IV lasix. She continues to endorse orthopnea and shortness of breath w/ exertion. Inquiry about if swelling has progressed to her stomach, she denies saying it is mid-thigh bilaterally only. When inquired about her home CHF medications, she endorses that the Entresto has not been approved by insurance yet so she is currently not taking it.  (23 Jan 2025 16:21)    Vascular consult was called because pt is admitting orthopnea, PND, swollen legs, intermittent claudication and rest pain especially on the rt leg. CHARMAINE/PVR studies reveal Severely abnormal right toe brachial index of 0.08. Moderately abnormal left toe brachial index of 0.42. No other complaints at this time.          PAST MEDICAL & SURGICAL HISTORY:  DM (diabetes mellitus)      Diabetic foot ulcer      Congestive heart failure (CHF)      CAD (coronary artery disease)      Cataract of both eyes, unspecified cataract type      History of cholecystectomy        MEDICATIONS  (STANDING):  ampicillin/sulbactam  IVPB 3 Gram(s) IV Intermittent every 6 hours  aspirin  chewable 81 milliGRAM(s) Oral daily  atorvastatin 40 milliGRAM(s) Oral at bedtime  heparin  Infusion.  Unit(s)/Hr (14 mL/Hr) IV Continuous <Continuous>  insulin glargine Injectable (LANTUS) 20 Unit(s) SubCutaneous at bedtime  insulin lispro (ADMELOG) corrective regimen sliding scale   SubCutaneous three times a day before meals  insulin lispro (ADMELOG) corrective regimen sliding scale   SubCutaneous at bedtime  metoprolol succinate ER 25 milliGRAM(s) Oral daily  polyethylene glycol 3350 17 Gram(s) Oral daily  senna 2 Tablet(s) Oral at bedtime    MEDICATIONS  (PRN):  acetaminophen     Tablet .. 1000 milliGRAM(s) Oral every 8 hours PRN Moderate Pain (4 - 6)  heparin   Injectable 6500 Unit(s) IV Push every 6 hours PRN For aPTT less than 40  heparin   Injectable 3000 Unit(s) IV Push every 6 hours PRN For aPTT between 40 - 57  melatonin 3 milliGRAM(s) Oral at bedtime PRN Insomnia  ondansetron Injectable 4 milliGRAM(s) IV Push every 8 hours PRN Nausea and/or Vomiting  oxyCODONE    IR 5 milliGRAM(s) Oral every 4 hours PRN Severe Pain (7 - 10)      Allergies    No Known Allergies    Intolerances        Social History:      FAMILY HISTORY:  Family history of CHF (congestive heart failure) (Mother)        Vital Signs Last 24 Hrs  T(C): 37.2 (27 Jan 2025 23:54), Max: 37.3 (27 Jan 2025 21:43)  T(F): 99 (27 Jan 2025 23:54), Max: 99.1 (27 Jan 2025 21:43)  HR: 90 (27 Jan 2025 23:54) (76 - 90)  BP: 119/66 (27 Jan 2025 23:54) (98/49 - 119/66)  BP(mean): 74 (27 Jan 2025 11:16) (68 - 74)  RR: 18 (27 Jan 2025 23:54) (17 - 18)  SpO2: 96% (27 Jan 2025 23:54) (92% - 99%)    Parameters below as of 27 Jan 2025 23:54  Patient On (Oxygen Delivery Method): room air        Physical Exam:  Vital Signs Last 24 Hrs  T(C): 37.2 (27 Jan 2025 23:54), Max: 37.3 (27 Jan 2025 21:43)  T(F): 99 (27 Jan 2025 23:54), Max: 99.1 (27 Jan 2025 21:43)  HR: 90 (27 Jan 2025 23:54) (76 - 90)  BP: 119/66 (27 Jan 2025 23:54) (98/49 - 119/66)  BP(mean): 74 (27 Jan 2025 11:16) (68 - 74)  RR: 18 (27 Jan 2025 23:54) (17 - 18)  SpO2: 96% (27 Jan 2025 23:54) (92% - 99%)    Parameters below as of 27 Jan 2025 23:54  Patient On (Oxygen Delivery Method): room air        General:  A&Ox3,Appears stated age, No acute distress,  Head: NC/AT  EENT: PERRLA. EOMI. Conjunctiva and sclera clear. Pharynx clear.  Neck: Supple. No JVD  Lungs: CTA B/l. Nonlabored Respirations  CV: +S1S2, RRR  Abdomen: Soft, Nondistended,  Nontender, no guarding, no rebound  Extremities: Rt dorsal foot edematous with granulating wound, poikilothermic, distal pulses are dopplerable but popliteal is difficult to palpate   .   Lt foot edematous , some blisters , nom open wound, dopplerable .  Other extremities  warm and well perfused.  Calf soft, nontender b/l.          LABS:                        10.1   12.42 )-----------( 394      ( 27 Jan 2025 22:16 )             28.7     01-27    138  |  102  |  56[H]  ----------------------------<  132[H]  3.9   |  26  |  1.55[H]    Ca    8.5      27 Jan 2025 06:55    TPro  7.1  /  Alb  2.5[L]  /  TBili  0.5  /  DBili  x   /  AST  25  /  ALT  29  /  AlkPhos  77  01-27    LIVER FUNCTIONS - ( 27 Jan 2025 06:55 )  Alb: 2.5 g/dL / Pro: 7.1 g/dL / ALK PHOS: 77 U/L / ALT: 29 U/L DA / AST: 25 U/L / GGT: x           PT/INR - ( 27 Jan 2025 22:16 )   PT: 12.9 sec;   INR: 1.11 ratio         PTT - ( 27 Jan 2025 22:16 )  PTT:28.7 sec  Urinalysis Basic - ( 27 Jan 2025 06:55 )    Color: x / Appearance: x / SG: x / pH: x  Gluc: 132 mg/dL / Ketone: x  / Bili: x / Urobili: x   Blood: x / Protein: x / Nitrite: x   Leuk Esterase: x / RBC: x / WBC x   Sq Epi: x / Non Sq Epi: x / Bacteria: x        RADIOLOGY & ADDITIONAL STUDIES:  < from: US Physiol Extremity Lower 3+ Level, BI (01.27.25 @ 14:28) >  FINDINGS:    ABIs are not obtained due to noncompressible vasculature.    Severely abnormal right toe brachial index of 0.08. Moderately abnormal   left toe brachial index of 0.42.    Segmental pressures are not obtained at the calves or ankles due to   calcified noncompressible vasculature. PVR waveforms are nonpulsatile at   the right ankle, right metatarsal, and right digital level, reflecting a   change since 12/16/2024. Limited pulsatility of left metatarsal and   digital medial waveforms. PVR waveforms of the thighs and calves are   pulsatile, though are abnormal in appearance.    Subsequent arterial duplex ultrasound was performed due to PVR findings.    Right leg: Antegrade flow is noted of the right common femoral artery and   superficial femoral artery. Popliteal artery is occluded with negligible   flow of right trifurcation arteries. Correlate for right leg ischemia.    Arterial velocities are tabulated below.  RIGHT/  CFA: 113 cm/s  SFA prox: 57cm/s  SFA mid: 76 cm/s  SFA dist: 67 cm/s  POP: Occluded, 15 cm/s  PTA: Occluded  CAPRICE: 7 cm/s  ALEX: 14 cm/s  DPA: Not imaged    Left leg: Antegrade flow is noted of left common femoral artery,   superficial femoral artery, and popliteal artery. Slightly tardus parvus   waveform of the left popliteal artery is noted, for which underlying   disease cannot be excluded. Posterior tibial artery waveform is   nonpulsatile, reflecting occlusion. Anterior tibial artery tardus parvus   flow is noted. Correlate for left leg ischemia.    Arterial velocities are tabulated below.  LEFT/  CFA: 105 cm/s  SFA: 65 cm/s  POP: 52 cm/s  PTA: occluded  CAPRICE: not imaged  ALEX: 29 cm/s  DPA: not imaged    IMPRESSION:    Limited CHARMAINE-PVR evaluation due to noncompressible vasculature. Subsequent   arterial duplex ultrasound was performed due to abnormal PVR findings,   particularly of the right leg.    Right Leg Arterial Duplex: Popliteal artery is occluded with negligible   flow of right trifurcation arteries. Correlate for right leg ischemia.    Left leg Arterial Duplex: Slightly tardus parvus waveform of the left   popliteal artery is noted, for which underlying disease cannot be   excluded. Posterior tibial artery waveform is nonpulsatile. Anterior   tibial artery tardus parvus flow is noted. Correlate for left leg   ischemia.    Recommend vascular surgery evaluation and runoff CTA for further   evaluation.    < end of copied text >

## 2025-01-28 NOTE — PROGRESS NOTE ADULT - ASSESSMENT
Patient is a 63yo Female with CHF (last TTE on 1/16/25 EF 30-35%, G2DD), DM, diabetic foot ulcer with a recent admission at [facility] for CHF exacerbation 1/14-1/17 p/w RLE swelling/ blister with pain. Pt a/w RLE soft tissue infection, and  diuresed for CHF. Pt with JAMEL for which Bumex/ Entresto held. Pt found to have cool RLE and is pending CTA aorta with runoff. Nephrology consulted for Elevated serum creatinine.      1. JAMEL- alvaro SCr 0.7-1; JAMEL in the setting of ampicillin/ sulbactam use with Bumex/ Entresto. Entresto and Bumex d/c 1/27. Pt with worsening renal function in the setting of hypotension. Avoid hypotension. Recc Dobutamine gtt to increase renal perfusion; will defer to Cards. Check UA and FeUrea. Check Renal US to r/o obstruction.   Recc to hold off on CTA until renal function improves. Recc Mucomyst 1200mg PO bid x 4 doses (2 doses pre contrast and 2 doses post contrast) and hold diuretics  to minimize risk of LILIAN.  Strict I/Os. Avoid nephrotoxins/ NSAIDs/ RCA. Monitor BMP.    2. Essential HTN-low normal BP. Consider decreasing Metoprolol ER to avoid hypotension. Entresto held due to JAMEL. c/w low salt diet. Monitor BP    3. CHF with fluid overload- once renal function improves and is stable post CTA; recc aggressive diuresis. Strict I/Os. Daily weights    4. Skin/ soft tissue infection- Lower extremities. Pt on ampicillin/ sulbactam; which can also cause JAMEL. Plan as per [facility] NEPHROLOGY  Raji Waterman M.D.  Mars Feliz D.O.  Kelley Parks M.D.  MD Nancy Kulkarni, MSN, ANP-C    Telephone: [phone]  Facsimile: [phone] [place] [address], [address]  [place] [phone]

## 2025-01-28 NOTE — PROGRESS NOTE ADULT - PROBLEM SELECTOR PLAN 2
Hx of CHF, previous admission in January, on home Lasix 40 qD, Metoprolol Succ 25 qD. Not on Entresto due to insurance issues  Last TTE: LVSF moderately decreased w/ EF 30-35 %. Moderate G2DD. Mild MR  Cardio consulted, Dr. Gilomre rec ischemic eval, pt now agreeable for stress test  Thoroughly discussed plan with pt and brother at bedside  Stress test: small-sized, moderate defect(s) in the apical wall that is predominantly fixed suggestive of an infarction with minimal marcus-infarct ischemia    -Admit to tele + q4 VS  -Strict I/Os  -Fluid Restrict 1.5 L  -Daily Weights  -hold Bumex iso needed CTA and worsening kidney fx  -c/w Metoprolol Succ 25 qD  -hold entresto Bumex iso needed CTA and worsening kidney fx Hx of CHF, previous admission in January, on home Lasix 40 qD, Metoprolol Succ 25 qD. Not on Entresto due to insurance issues  Last TTE: LVSF moderately decreased w/ EF 30-35 %. Moderate G2DD. Mild MR  Cardio consulted, Dr. Gilmore rec ischemic eval, pt now agreeable for stress test  Thoroughly discussed plan with pt and brother at bedside  Stress test: small-sized, moderate defect(s) in the apical wall that is predominantly fixed suggestive of an infarction with minimal marcus-infarct ischemia    -Admit to tele + q4 VS  -Strict I/Os  -Fluid Restrict 1.5 L  -Daily Weights  -hold Bumex & Entresto iso worsening kidney fx  -c/w Metoprolol Succ 25 qD

## 2025-01-28 NOTE — PROGRESS NOTE ADULT - ASSESSMENT
64y.o. Female with RLE coolness, exam stable  CHARMAINE/PVR resulted, poor wave forms    -Awaiting CTA aorta with runoff, on hold due to Cr  -Trend Cr, CTA when safe  -Keep compression dressing  -LE elevation above heart level at rest  -OOB ambulate  -Signed out to covering PA [phone] 64y.o. Female with RLE coolness, exam stable  CHARMAINE/PVR resulted, poor wave forms    -Transfer to [facility] for CO2 angiogram  -Keep compression dressing  -LE elevation above heart level at rest  -OOB ambulate  -Signed out to [facility] [phone]  -D/w Dr. Baltazar 64y.o. Female with RLE coolness, exam stable  CHARMAINE/PVR resulted, poor wave forms    -Patient requiring bilateral angiogram, not a candidate for CTA w/ runoff due to Cr function  -Transfer to [facility] for CO2 angiogram  -Keep compression dressing  -LE elevation above heart level at rest  -OOB ambulate  -Signed out to [facility] [phone]  -D/w Dr. Baltazar

## 2025-01-28 NOTE — PROGRESS NOTE ADULT - PROBLEM SELECTOR PLAN 1
Had fever 101.5 on admission  patient denying any recent history of fever  UA negative  COVID/viral panel negative  Abdomen without pain, soft nontender  discussed with podiatry lower extremity and reviewed photos- lower extremity appears improved compared to prior  Xray with bilateral effusions- check CT chest  CT chest: Small bilateral pleural effusions and small pericardial effusion.    -c/w Unasyn at this time, switch to augmentin per ID when appropriate Had fever 101.5 on admission  patient denying any recent history of fever  UA negative  COVID/viral panel negative  Abdomen without pain, soft nontender  discussed with podiatry lower extremity and reviewed photos- lower extremity appears improved compared to prior  Xray with bilateral effusions- check CT chest  CT chest: Small bilateral pleural effusions and small pericardial effusion.    Transitioned from Unasyn to PO Augmentin 500 Q12 starting 1/29

## 2025-01-28 NOTE — DISCHARGE NOTE PROVIDER - NSDCMRMEDTOKEN_GEN_ALL_CORE_FT
furosemide 40 mg oral tablet: 1 tab(s) orally once a day  Insulin Pump with Cequer/Simplicity (home med): Continue 5-6 clicks (10-12 units), instructions were given per endocrine dr. Bhagat.  metoprolol succinate 25 mg oral tablet, extended release: 1 tab(s) orally once a day  Tresiba 100 units/mL subcutaneous solution: 30 unit(s) subcutaneous once a day (at bedtime)   acetaminophen 500 mg oral tablet: 2 tab(s) orally every 8 hours As needed Moderate Pain (4 - 6)  aspirin 81 mg oral tablet, chewable: 1 tab(s) orally once a day  atorvastatin 40 mg oral tablet: 1 tab(s) orally once a day (at bedtime)  insulin glargine 100 units/mL subcutaneous solution: 20 unit(s) subcutaneous once a day (at bedtime)  melatonin 3 mg oral tablet: 1 tab(s) orally once a day (at bedtime) As needed Insomnia  metoprolol succinate 25 mg oral tablet, extended release: 1 tab(s) orally once a day  ondansetron 2 mg/mL injectable solution: 4 milligram(s) injectable every 8 hours as needed for  nausea  oxyCODONE 5 mg oral tablet: 1 tab(s) orally every 4 hours As needed Severe Pain (7 - 10)  polyethylene glycol 3350 oral powder for reconstitution: 17 gram(s) orally once a day  senna leaf extract oral tablet: 2 tab(s) orally once a day (at bedtime)   acetaminophen 500 mg oral tablet: 2 tab(s) orally every 8 hours As needed Moderate Pain (4 - 6)  Admelog 100 units/mL injectable solution: injectable 4 times a day (with meals and at bedtime) Subcutaneous 3 times a day before meals AND at bedtime  1 unit if Glucose 151-200  2 unit if Glucose 201-250  3 unit if Glucose 251-300  4 unit if Glucose 301-350  5 unit if Glucose 351-400  6 units if Glucose &gt; 400 + Contact MD  ampicillin-sulbactam: every 12 hours 3 grams in sodium chloride 0.9% 100 mL, IV Intermittent, every 12 hours, infuse over 30 minutes  aspirin 81 mg oral tablet, chewable: 1 tab(s) orally once a day  atorvastatin 40 mg oral tablet: 1 tab(s) orally once a day (at bedtime)  DOBUTamine: now 500 mg in dextrose 5% 250 mL, infuse at 5.87 mL/Hr  Dose Rate: 2.5 Microgram/kg/Min  heparin: prn 6500 UNITS iv push, every 6 hours, PRN for aPTT less than 40  heparin: prn 3000 units, IV Push, every 6 hours, PRN for aPTT between 40-57  heparin 100 units/mL-D5% intravenous solution: intravenous now 78551 UNITS IN DEXTROSE 5% 250 ML, INFUSE AT 14 ML/HR  TARGET APTT = 58-99 SECONDS  USE FULL ANTICOAGULATION NOMOGRAM  insulin glargine 100 units/mL subcutaneous solution: 20 unit(s) subcutaneous once a day (at bedtime)  melatonin 3 mg oral tablet: 1 tab(s) orally once a day (at bedtime) As needed Insomnia  metoprolol succinate 25 mg oral tablet, extended release: 1 tab(s) orally once a day  ondansetron 2 mg/mL injectable solution: 4 milligram(s) injectable every 8 hours as needed for  nausea  oxyCODONE 5 mg oral tablet: 1 tab(s) orally every 4 hours As needed Severe Pain (7 - 10)  polyethylene glycol 3350 oral powder for reconstitution: 17 gram(s) orally once a day  senna leaf extract oral tablet: 2 tab(s) orally once a day (at bedtime)   acetaminophen 500 mg oral tablet: 2 tab(s) orally every 8 hours As needed Moderate Pain (4 - 6)  Admelog 100 units/mL injectable solution: injectable 4 times a day (with meals and at bedtime) Subcutaneous 3 times a day before meals AND at bedtime  1 unit if Glucose 151-200  2 unit if Glucose 201-250  3 unit if Glucose 251-300  4 unit if Glucose 301-350  5 unit if Glucose 351-400  6 units if Glucose &gt; 400 + Contact MD  amoxicillin-clavulanate 500 mg-125 mg oral tablet: 1 tab(s) orally every 12 hours  aspirin 81 mg oral tablet, chewable: 1 tab(s) orally once a day  atorvastatin 40 mg oral tablet: 1 tab(s) orally once a day (at bedtime)  DOBUTamine: now 500 mg in dextrose 5% 250 mL, infuse at 5.87 mL/Hr  Dose Rate: 2.5 Microgram/kg/Min  heparin: prn 6500 UNITS iv push, every 6 hours, PRN for aPTT less than 40  heparin: prn 3000 units, IV Push, every 6 hours, PRN for aPTT between 40-57  heparin 100 units/mL-D5% intravenous solution: intravenous now 31990 UNITS IN DEXTROSE 5% 250 ML, INFUSE AT 14 ML/HR  TARGET APTT = 58-99 SECONDS  USE FULL ANTICOAGULATION NOMOGRAM  insulin glargine 100 units/mL subcutaneous solution: 20 unit(s) subcutaneous once a day (at bedtime)  melatonin 3 mg oral tablet: 1 tab(s) orally once a day (at bedtime) As needed Insomnia  metoprolol succinate 25 mg oral tablet, extended release: 1 tab(s) orally once a day  ondansetron 2 mg/mL injectable solution: 4 milligram(s) injectable every 8 hours as needed for  nausea  oxyCODONE 5 mg oral tablet: 1 tab(s) orally every 4 hours As needed Severe Pain (7 - 10)  polyethylene glycol 3350 oral powder for reconstitution: 17 gram(s) orally once a day  senna leaf extract oral tablet: 2 tab(s) orally once a day (at bedtime)   acetaminophen 500 mg oral tablet: 2 tab(s) orally every 8 hours As needed Moderate Pain (4 - 6)  Admelog 100 units/mL injectable solution: injectable 4 times a day (with meals and at bedtime) Subcutaneous 3 times a day before meals AND at bedtime  1 unit if Glucose 151-200  2 unit if Glucose 201-250  3 unit if Glucose 251-300  4 unit if Glucose 301-350  5 unit if Glucose 351-400  6 units if Glucose &gt; 400 + Contact MD  amoxicillin-clavulanate 500 mg-125 mg oral tablet: 1 tab(s) orally every 12 hours  aspirin 81 mg oral tablet, chewable: 1 tab(s) orally once a day  atorvastatin 40 mg oral tablet: 1 tab(s) orally once a day (at bedtime)  DOBUTamine: now 500 mg in dextrose 5% 250 mL, infuse at 5.87 mL/Hr  Dose Rate: 2.5 Microgram/kg/Min  heparin: prn 6500 UNITS iv push, every 6 hours, PRN for aPTT less than 40  heparin: prn 3000 units, IV Push, every 6 hours, PRN for aPTT between 40-57  heparin 100 units/mL-D5% intravenous solution: intravenous now 84365 UNITS IN DEXTROSE 5% 250 ML, INFUSE AT 14 ML/HR  TARGET APTT = 58-99 SECONDS  USE FULL ANTICOAGULATION NOMOGRAM  insulin glargine 100 units/mL subcutaneous solution: 16 unit(s) subcutaneous once a day (at bedtime)  melatonin 3 mg oral tablet: 1 tab(s) orally once a day (at bedtime) As needed Insomnia  metoprolol succinate 25 mg oral tablet, extended release: 1 tab(s) orally once a day  ondansetron 2 mg/mL injectable solution: 4 milligram(s) injectable every 8 hours as needed for  nausea  oxyCODONE 5 mg oral tablet: 1 tab(s) orally every 6 hours As needed Severe Pain (7 - 10)  polyethylene glycol 3350 oral powder for reconstitution: 17 gram(s) orally once a day  senna leaf extract oral tablet: 2 tab(s) orally once a day (at bedtime)

## 2025-01-28 NOTE — PROGRESS NOTE ADULT - PROBLEM SELECTOR PLAN 4
Podiatry following, b/l serous bullae, no concerns for infection  Consulted pain management  s/p morphine 2mg x 1 prior to stress test    Pain recs:  -Oxycodone 2.5 mg/5 mg PO q 6 hours PRN moderate/severe pain.   -Acetaminophen 1 gram PO q 12 hours for 3 days. Monitor LFTs  - Appreciate pain recs, gabapentin with remote risk to increase risk of chf. WIll start gabapentin 100mg at bedtime    -pending CTA aorta with runoff pending improved kidney function 1/28 Podiatry following, b/l serous bullae, no concerns for infection  Consulted pain management  s/p morphine 2mg x 1 prior to stress test  Found to have RLE coolness  -Right Leg Arterial Duplex: Popliteal artery is occluded with negligible flow of right trifurcation arteries.  -Left leg Arterial Duplex: Slightly tardus parvus waveform of the left popliteal artery is noted, for which underlying disease cannot be excluded. Posterior tibial artery waveform is nonpulsatile. Anterior tibial artery tardus parvus flow is noted.    -On Oxy 5 q6h for pain PRN iso worsening kidney function  -Started on heparin gtt and dobutamine gtt iso Duplex results  -Will transfer to [facility] for CO2 angiogram as per vascular surgery as not candidate for CTA due to worsening SCr  -Keep compression dressing  -LE elevation above heart level at rest

## 2025-01-28 NOTE — CONSULT NOTE ADULT - REASON FOR ADMISSION
R. Leg pain and swelling
R. Leg pain and swelling
CHF exacerbation
R. Leg pain and swelling

## 2025-01-28 NOTE — PROGRESS NOTE ADULT - ATTENDING COMMENTS
S:  c/o/ shortness of breath and she felt that she is not urinating as much  O:  Vital Signs Last 24 Hrs  T(C): 36.7 (01-28-25 @ 11:30), Max: 37.3 (01-27-25 @ 21:43)  T(F): 98.1 (01-28-25 @ 11:30), Max: 99.1 (01-27-25 @ 21:43)  HR: 73 (01-28-25 @ 11:30) (73 - 90)  BP: 109/55 (01-28-25 @ 11:30) (104/59 - 119/66)  RR: 19 (01-28-25 @ 11:30) (17 - 19)  SpO2: 99% (01-28-25 @ 11:30) (95% - 99%)  PE:  Gen: Oriented, alert, No acute distress Eyes: conjunctivae and lid normal, sclera clear with no icterus ENT: nose and throat exam normal CVS: S1, S2, RRR; no murmur, no rubs, no gallops Pulm: Good air exchange, Breath sounds equal bilaterally, rales,  no wheezes Chest: nontender, no chest deformity, chest movement symmetrical Gl: abdomen soft, nontender, nondistended, bowel sounds normoactive, no masses palpated Musk: no msk pain, 2+ pitting edema BLE, Right toes cool to touch Skin: no skin lesions, skin turgor normal, warm and well perfused Neuro: Awake, alert,Psych: normal affect, insight    A/P:  Pancho Kirby is a 63 yo woman with PMH significant for CAD, HFrEF, uncontrolled DM, diabetic foot ulcer  who presented R leg pain and swelling and SOB admitted for HFrEF exacerbation and also found to have R popliteal obstruction.    #Acute HFrEF exacerbation  #Diabetic Foot ulcer  #DM2  #Transient Fever  #Coronary Artery Disease  #Sepsis 2/2 cellulitis   #Evaluation for ischemic foot  #Acute kidney injury  #R popliteal obstruction  -for more diuresis, will be on dobutamine drip  -New decreased EF- patient previously declined cath and NST  - Nuclear stress test performed 1/24- abnormal stress test  Discussed with cardiology, fixed defect, do not believe to be ischemic, no cardiac cath indicated at this time.  - Continue with GDMT, betablocker, entresto held 1/27 due to JAMEL  - Hx of CAD, new decreased EF should at minimum be on aspirin, statin.  -endocrine consult for DM management- resume insulin from previous admission  -- Sugars are too tightly controlled, and in the setting of jamel., continue with 20 units basal insulin and sliding scale  -Seen by ID , noted cold right lower ext. Seen by vascular will transfer to [place] under Dr. Baltazar's service (vascular surgery)  - COntinue with IV unasyn renally adjusted until renal function stable.     Labs reviewed:                         9.2    13.05 )-----------( 404      ( 28 Jan 2025 07:12 )             26.5   01-28    135  |  100  |  63[H]  ----------------------------<  115[H]  4.5   |  24  |  2.36[H]    Ca    8.3[L]      28 Jan 2025 07:12  Phos  4.7     01-28  Mg     2.3     01-28    TPro  7.1  /  Alb  2.5[L]  /  TBili  0.5  /  DBili  x   /  AST  25  /  ALT  29  /  AlkPhos  77  01-27        Total Time Spent 50 minutes  This includes chart review, patient assessment, discussion and collaboration with interdisciplinary team members.
Seen this AM, improvement in right foot pain, sensation intact. No difficulty breathing.   New JAMEL today- holding diuresis and entresto      #Acute HFrEF exacerbation  #Diabetic Foot ulcer  #DM2  #Transient Fever  #Coronary Artery Disease  #Sepsis 2/2 cellulitis   #Evaluation for ischemic foot  #Acute kidney injury      New JAMEL today- dc po bumex and entresto  New decreased EF- patient previously declined cath and NST  - Nuclear stress test performed 1/24- abnormal stress test  Discussed with cardiology, fixed defect, do not believe to be ischemic, no cardiac cath indicated at this time.  - Continue with GDMT, betablocker, entresto held 1/27 due to JAMEL  - Hx of CAD, new decreased EF should at minimum be on aspirin, statin.  -endocrine consult for DM management- resume insulin from previous admission  -- Sugars are too tightly controlled, and in the setting of jamel., continue with 20 units basal insulin and sliding scale  -Seen by ID , noted cold right lower ext. Seen by vascular will pursue right lower ext CTA and bilateral ABIs. Followup podiatry/vascular  - Needs resolution of jamel prior to contrast so holding po bumex and entresto, followup creatinine in AM    She had a fever on admission, no source was ilicited as podiatry did not think legs were cellulitic  She spiked another fever and started unasyn. Blood cultures negative, COntinue with IV unasyn renally adjusted until renal function stable. CrCl 45
#Acute HFrEF exacerbation  #Diabetic Foot ulcer  #DM2  #Transient Fever  #Coronary Artery Disease  #Sepsis 2/2 cellulitis       Change furosemide to IV bumex 1mg bid, check i+os, daily weights  - prior to discharge ideally should be monitored on maintenance PO medications before DC  New decreased EF- patient previously declined cath and NST  - Nuclear stress test performed 1/24- abnormal stress test  Discussed with cardiology, fixed defect, do not believe to be ischemic, no cardiac cath indicated at this time.  - Continue with GDMT, betablocker and entresto for now  - Change to po diuresis tomorrow 1/26/2025  - Hx of CAD, new decreased EF should at minimum be on aspirin, statin.  -endocrine consult for DM management- resume insulin from previous admission  -- Sugars are too tightly controlled, decreased basal insulin to 25units, dc prandial insulin, continue sliding scale  -podiatry consulted, her wounds are due to fluid overload- discussed with pod, they do not believe her legs are cellulitic, they appear improved from last admission.    - Had another fever today, ct chest showing atelectasis, pleural effusion. Likely source is the legs. Discussed with ID, start unasyn, ID to followup

## 2025-01-28 NOTE — DISCHARGE NOTE PROVIDER - PROVIDER TOKENS
PROVIDER:[TOKEN:[[id number]],FOLLOWUP:[1 week]],PROVIDER:[TOKEN:[[id number]],FOLLOWUP:[1 week]]

## 2025-01-28 NOTE — DISCHARGE NOTE PROVIDER - NSDCCAREPROVSEEN_GEN_ALL_CORE_FT
Jose, Monae Burrell, Justin Gilmore, Yordy Baltazar, Mohsen Belmonte, Lucy Mooney, Jean Paul Mixon, Dorene Goff, Shaun Wallace, Wilfrid Greenfield, Amaris Horn, Julee Ty, Mike Munson, Darío Mccoy, Suzi Cabrera, Raji Childers, Marilin Weathers, Cyril Holcomb, Zainab Mojica, Ashley Parks, Kelley Mckoy, Marilyn Perera, Nargis Coyle, Harris DUARTE

## 2025-01-28 NOTE — DISCHARGE NOTE PROVIDER - CARE PROVIDER_API CALL
Reyes Donohue  Internal Medicine  [address] [facility], [place] [address]  Phone: [phone]  Fax: [phone]  Follow Up Time: 1 week    Bernice Bhagat  Endocrinology/Metab/Diabetes  [address] [place] [id number]  Phone: [phone]  Fax: [phone]  Follow Up Time: 1 week

## 2025-01-28 NOTE — DISCHARGE NOTE PROVIDER - [email or web address]_[email or web address]
PRINCIPAL DISCHARGE DIAGNOSIS  Diagnosis: Systemic inflammatory response syndrome (SIRS)  Assessment and Plan of Treatment:       SECONDARY DISCHARGE DIAGNOSES  Diagnosis: CHF exacerbation  Assessment and Plan of Treatment: You have history of CHF (congestive heart failure).  On this admission, your CXR showed some fluid in the lungs and your CT chest showed small amount of fluid in both lungs and small amount of fluid around your heart. Cardiology was consulted and you were initially treated with BUMEX IV to help with diuresis which improved your respiratory symptoms and leg swelling. Please weigh yourself daily and If you gain 3lbs in 3 days, or 5lbs in a week and /or have any swelling or increased swelling in your feet, ankles, and/or stomach call your Health Care Provider. Do not eat or drink foods containing more than 2000 mg of salt (sodium) in your diet every day and limit fluids to 800cc to 1000 cc per day. Please take your METOPROLOL SUCCINATE 25 DAILY. We held your ENTRESTO 24/26 TWICE A DAY AND YOUR IV BUMEX twice a day due to your worsening kidney function. Please follow up with your PCP/Cardiologist in 1 week from discharge to adjust medications as needed.    Diagnosis: Diabetes  Assessment and Plan of Treatment:     Diagnosis: Wound of lower extremity  Assessment and Plan of Treatment: You presented with wounds on both of your legs. You     PRINCIPAL DISCHARGE DIAGNOSIS  Diagnosis: Wound of lower extremity  Assessment and Plan of Treatment: You presented with wounds on both of your legs with pain. Podiatry was consulted who initially explained the pain was likely due to fluid overload and exposed skin from the lansed wound. Your x-rays did not show any gas or erosions. Podiatry recommended: xeroform, 4x4 gauze, abd pad, maru, and ACE to be applied to both lower extremities 3 times a week or as necessary if dressings are saturated.      SECONDARY DISCHARGE DIAGNOSES  Diagnosis: Systemic inflammatory response syndrome (SIRS)  Assessment and Plan of Treatment: You presented with a fever of 101.5 during your stay. Your urinalysis and respiratory viral panel were negative. Your x-ray of chest and CT of chest showed no pneumonia. ID was consulted and it is believed your source of fever is infection of the skin/soft tissue of your legs. You were started on IV UNASYN 3 GRAMS EVERY 6 HOURS. Please continue this medication on discharge.    Diagnosis: CHF exacerbation  Assessment and Plan of Treatment: You have history of CHF (congestive heart failure).  On this admission, your CXR showed some fluid in the lungs and your CT chest showed small amount of fluid in both lungs and small amount of fluid around your heart. Cardiology was consulted and you were initially treated with BUMEX IV to help with diuresis which improved your respiratory symptoms and leg swelling. Please weigh yourself daily and If you gain 3lbs in 3 days, or 5lbs in a week and /or have any swelling or increased swelling in your feet, ankles, and/or stomach call your Health Care Provider. Do not eat or drink foods containing more than 2000 mg of salt (sodium) in your diet every day and limit fluids to 800cc to 1000 cc per day. Please take your METOPROLOL SUCCINATE 25 DAILY. We held your ENTRESTO 24/26 TWICE A DAY AND YOUR IV BUMEX TWICE A DAY due to your worsening kidney function. Please follow up with your PCP/Cardiologist in 1 week from discharge to adjust medications as needed.    Diagnosis: Diabetes  Assessment and Plan of Treatment: You have a history of diabetes. Your HbA1c was 11.6 during this admission. Endocrinology was consulted you were placed on an insulin regimen of Lantus 30 units at bedtime and Lispro 10 units 3 times a day. Your sugars in the hospital improved and you transitioned to LANTUS 20 UNITS BEDTIME WITH LOW DOSE INSULIN SLIDING SCALE and LOW DOSE INSULIN SLIDING SCALE THREE TIMES A DAY. You need to continue monitoring your blood sugar levels closely and maintain healthy lifestyle by eating healthy diabetic regimen, weight loss and exercise regularly as tolerated. Please continue to take your LANTUS 20 UNITS BEDTIME WITH LOW DOSE INSULIN SLIDING SCALE and LOW DOSE INSULIN SLIDING SCALE THREE TIMES A DAY. Please follow up with your PCP/Endocrinologist within a week of discharge.     PRINCIPAL DISCHARGE DIAGNOSIS  Diagnosis: Wound of lower extremity  Assessment and Plan of Treatment: You presented with wounds on both of your legs with pain. Podiatry was consulted who initially explained the pain was likely due to fluid overload and exposed skin from the lansed wound. Your x-rays did not show any gas or erosions. Podiatry recommended: xeroform, 4x4 gauze, abd pad, maru, and ACE to be applied to both lower extremities 3 times a week or as necessary if dressings are saturated. Due to worsening pain in your legs, development of a fever during your stay, and cooling of your right lower extremity pain management and vascular surgery was consulted. You are currently taking OXYCODONE 5 MG EVERY 4 HOURS AS NEEDED FOR SEVERE PAIN. Your scans of your lower extremity arteries showed right popliteal artery is occluded with negligible flow of right trifurcation arteries. You were started on a HEPARIN DRIP and DOBUMATINE DRIP. Vascular surgery recommended transferring to [facility] for CO2 angiogram for further imaging of the lower extremitIES. It is advised you KEEP COMPRESSION DRESSING, ELEVATE THE LOWER EXTREMITY ABOVE HEART LEVEL AT REST, AMBULATE AS TOLERATED, CONTINUE THE HEPARIN DRIP and DOBUTAMINE DRIP, AND OXYCODONE 5MG EVERY 4 HOURS AS NEEDED FOR SEVERE PAIN.      SECONDARY DISCHARGE DIAGNOSES  Diagnosis: Systemic inflammatory response syndrome (SIRS)  Assessment and Plan of Treatment: You presented with a fever of 101.5 during your stay. Your urinalysis and respiratory viral panel were negative. Your x-ray of chest and CT of chest showed no pneumonia. ID was consulted and it is believed your source of fever is infection of the skin/soft tissue of your legs. You were started on IV UNASYN 3 GRAMS EVERY 6 HOURS. Please continue this medication on discharge.    Diagnosis: CHF exacerbation  Assessment and Plan of Treatment: You have history of CHF (congestive heart failure).  On this admission, your CXR showed some fluid in the lungs and your CT chest showed small amount of fluid in both lungs and small amount of fluid around your heart. Cardiology was consulted and you were initially treated with BUMEX IV to help with diuresis which improved your respiratory symptoms and leg swelling. Please weigh yourself daily and If you gain 3lbs in 3 days, or 5lbs in a week and /or have any swelling or increased swelling in your feet, ankles, and/or stomach call your Health Care Provider. Do not eat or drink foods containing more than 2000 mg of salt (sodium) in your diet every day and limit fluids to 800cc to 1000 cc per day. Please take your METOPROLOL SUCCINATE 25 DAILY. We held your ENTRESTO 24/26 TWICE A DAY AND YOUR IV BUMEX TWICE A DAY due to your worsening kidney function. Please follow up with your PCP/Cardiologist in 1 week from discharge to adjust medications as needed.    Diagnosis: Diabetes  Assessment and Plan of Treatment: You have a history of diabetes for which you take home Tresiba 30 units and have a Cequer insulin pump. Your HbA1c was 11.6 during this admission. Endocrinology was consulted you were placed on an insulin regimen of Lantus 30 units at bedtime and Lispro 10 units 3 times a day. Your sugars in the hospital improved and you transitioned to LANTUS 20 UNITS BEDTIME WITH LOW DOSE INSULIN SLIDING SCALE and LOW DOSE INSULIN SLIDING SCALE THREE TIMES A DAY. You need to continue monitoring your blood sugar levels closely and maintain healthy lifestyle by eating healthy diabetic regimen, weight loss and exercise regularly as tolerated. Please continue to take your LANTUS 20 UNITS BEDTIME WITH LOW DOSE INSULIN SLIDING SCALE and LOW DOSE INSULIN SLIDING SCALE THREE TIMES A DAY. Please follow up with your PCP/Endocrinologist within a week of discharge.    Diagnosis: JAMEL (acute kidney injury)  Assessment and Plan of Treatment: JAMEL (acute kidney injury) is a condition in which the kidneys suddenly can't filter waste from the blood. Acute renal failure develops rapidly over a few hours or daysl. It's most common in those who are critically ill and already hospitalized. Symptoms include decreased urinary output, swelling due to fluid retention, nausea, fatigue, and shortness of breath. Sometimes symptoms may be subtle or may not appear at all. In addition to addressing the underlying cause, treatments include fluids, medication, and dialysis. A marker of your kidney function is your serum BUN/Creatinine ratio which elevates when the kidney gets injured. Your Creatinine was elevated. Your baseline Serum Creatine is 0.7-1. Your last serum creatinine level was 1.11 on admission and uptrended to 2.36. Nephrology was consulted and we stopped your IV BUMEX AND ENTRESTO. Once your kidney function improves, you may resume your IV BUMEX and ENTRESTO. Please follow up with your PCP in a week from discharge for further recommendations. You are recommended to follow up with your PCP and Nephrologist in a week from discharge.    Diagnosis: CAD (coronary artery disease)  Assessment and Plan of Treatment: Coronary artery disease is a narrowing of the arteries of your heart caused by a buildup of plaque made of cholesterol. Due to your diabetes and new congestive heart failure, you are at risk for cardiac events. You were started on ASPIRIN 81 DAILY AND ATORVASTATIN 40 MG DAILY. A nuclear stress test was performed and revealed a small-sized, moderate defect(s) in the apical wall suggestive of an infarction. Cardiology was consulted and no interventions were performed at this time. Please continue to take your ASPIRIN 81 DAILY AND ATORVASTATIN 40 MG DAILY as prescribed. Please follow with your PCP/Cardiologist in a week.     PRINCIPAL DISCHARGE DIAGNOSIS  Diagnosis: Critical limb ischemia of right lower extremity  Assessment and Plan of Treatment: You presented with wounds on both of your legs with pain. Podiatry was consulted who initially explained the pain was likely due to fluid overload and exposed skin from the lansed wound. Your x-rays did not show any gas or erosions. Podiatry recommended: xeroform, 4x4 gauze, abd pad, maru, and ACE to be applied to both lower extremities 3 times a week or as necessary if dressings are saturated. Due to worsening pain in your legs, development of a fever during your stay, and cooling of your right lower extremity pain management and vascular surgery was consulted. You are currently taking OXYCODONE 5 MG EVERY 4 HOURS AS NEEDED FOR SEVERE PAIN. Your scans of your lower extremity arteries showed right popliteal artery is occluded with negligible flow of right trifurcation arteries. You were started on a HEPARIN DRIP and DOBUMATINE DRIP. Vascular surgery recommended transferring to [facility] for CO2 angiogram for further imaging of the lower extremitues and surgical procedure evaluation for stenting. It is advised you KEEP COMPRESSION DRESSING, ELEVATE THE LOWER EXTREMITY ABOVE HEART LEVEL AT REST, AMBULATE AS TOLERATED, CONTINUE THE HEPARIN DRIP and DOBUTAMINE DRIP, AND OXYCODONE 5MG EVERY 4 HOURS AS NEEDED FOR SEVERE PAIN.      SECONDARY DISCHARGE DIAGNOSES  Diagnosis: CHF exacerbation  Assessment and Plan of Treatment: You have history of CHF (congestive heart failure).  On this admission, your CXR showed some fluid in the lungs and your CT chest showed small amount of fluid in both lungs and small amount of fluid around your heart. Cardiology was consulted and you were initially treated with BUMEX IV to help with diuresis which improved your respiratory symptoms and leg swelling. Please weigh yourself daily and If you gain 3lbs in 3 days, or 5lbs in a week and /or have any swelling or increased swelling in your feet, ankles, and/or stomach call your Health Care Provider. Do not eat or drink foods containing more than 2000 mg of salt (sodium) in your diet every day and limit fluids to 800cc to 1000 cc per day. Please take your METOPROLOL SUCCINATE 25 DAILY. We held your ENTRESTO 24/26 TWICE A DAY AND YOUR IV BUMEX TWICE A DAY due to your worsening kidney function. Please follow up with your PCP/Cardiologist in 1 week from discharge to adjust medications as needed.    Diagnosis: Diabetes  Assessment and Plan of Treatment: You have a history of diabetes for which you take home Tresiba 30 units and have a Cequer insulin pump. Your HbA1c was 11.6 during this admission. Endocrinology was consulted you were placed on an insulin regimen of Lantus 30 units at bedtime and Lispro 10 units 3 times a day. Your sugars in the hospital improved and you transitioned to LANTUS 20 UNITS BEDTIME WITH LOW DOSE INSULIN SLIDING SCALE and LOW DOSE INSULIN SLIDING SCALE THREE TIMES A DAY. You need to continue monitoring your blood sugar levels closely and maintain healthy lifestyle by eating healthy diabetic regimen, weight loss and exercise regularly as tolerated. Please continue to take your LANTUS 20 UNITS BEDTIME WITH LOW DOSE INSULIN SLIDING SCALE and LOW DOSE INSULIN SLIDING SCALE THREE TIMES A DAY. Please follow up with your PCP/Endocrinologist within a week of discharge.    Diagnosis: Systemic inflammatory response syndrome (SIRS)  Assessment and Plan of Treatment: You presented with a fever of 101.5 during your stay. Your urinalysis and respiratory viral panel were negative. Your x-ray of chest and CT of chest showed no pneumonia. ID was consulted and it is believed your source of fever is infection of the skin/soft tissue of your legs. You were started on IV UNASYN 3 GRAMS EVERY 6 HOURS. Please continue this medication on discharge.    Diagnosis: CAD (coronary artery disease)  Assessment and Plan of Treatment: Coronary artery disease is a narrowing of the arteries of your heart caused by a buildup of plaque made of cholesterol. Due to your diabetes and new congestive heart failure, you are at risk for cardiac events. You were started on ASPIRIN 81 DAILY AND ATORVASTATIN 40 MG DAILY. A nuclear stress test was performed and revealed a small-sized, moderate defect(s) in the apical wall suggestive of an infarction. Cardiology was consulted and no interventions were performed at this time. Please continue to take your ASPIRIN 81 DAILY AND ATORVASTATIN 40 MG DAILY as prescribed. Please follow with your PCP/Cardiologist in a week.    Diagnosis: JAMEL (acute kidney injury)  Assessment and Plan of Treatment: JAMEL (acute kidney injury) is a condition in which the kidneys suddenly can't filter waste from the blood. Acute renal failure develops rapidly over a few hours or daysl. It's most common in those who are critically ill and already hospitalized. Symptoms include decreased urinary output, swelling due to fluid retention, nausea, fatigue, and shortness of breath. Sometimes symptoms may be subtle or may not appear at all. In addition to addressing the underlying cause, treatments include fluids, medication, and dialysis. A marker of your kidney function is your serum BUN/Creatinine ratio which elevates when the kidney gets injured. Your Creatinine was elevated. Your baseline Serum Creatine is 0.7-1. Your last serum creatinine level was 1.11 on admission and uptrended to 2.36. Nephrology was consulted and we stopped your IV BUMEX AND ENTRESTO. Once your kidney function improves, you may resume your IV BUMEX and ENTRESTO. Please follow up with your PCP in a week from discharge for further recommendations. You are recommended to follow up with your PCP and Nephrologist in a week from discharge.     PRINCIPAL DISCHARGE DIAGNOSIS  Diagnosis: Critical limb ischemia of right lower extremity  Assessment and Plan of Treatment: You presented with wounds on both of your legs with pain. Podiatry was consulted who initially explained the pain was likely due to fluid overload and exposed skin from the lansed wound. Your x-rays did not show any gas or erosions. Podiatry recommended: xeroform, 4x4 gauze, abd pad, maru, and ACE to be applied to both lower extremities 3 times a week or as necessary if dressings are saturated. Due to worsening pain in your legs, development of a fever during your stay, and cooling of your right lower extremity pain management and vascular surgery was consulted. You are currently taking OXYCODONE 5 MG EVERY 4 HOURS AS NEEDED FOR SEVERE PAIN. Your scans of your lower extremity arteries showed right popliteal artery is occluded with negligible flow of right trifurcation arteries. You were started on a HEPARIN DRIP and DOBUMATINE DRIP. Vascular surgery recommended transferring to [facility] for CO2 angiogram for further imaging of the lower extremitues and surgical procedure evaluation for stenting. It is advised you KEEP COMPRESSION DRESSING, ELEVATE THE LOWER EXTREMITY ABOVE HEART LEVEL AT REST, AMBULATE AS TOLERATED, CONTINUE THE HEPARIN DRIP and DOBUTAMINE DRIP, AND OXYCODONE 5MG EVERY 4 HOURS AS NEEDED FOR SEVERE PAIN.      SECONDARY DISCHARGE DIAGNOSES  Diagnosis: CHF exacerbation  Assessment and Plan of Treatment: You have history of CHF (congestive heart failure).  On this admission, your CXR showed some fluid in the lungs and your CT chest showed small amount of fluid in both lungs and small amount of fluid around your heart. Cardiology was consulted and you were initially treated with BUMEX IV to help with diuresis which improved your respiratory symptoms and leg swelling. YOU HAD AN ECHOCARDIOGRAM THAT SHOWED NEW REDUCED HEART FUNCTION TO 35%, YOU HAD A STRESS TEST SHOWING A FIXED DEFECT. Please weigh yourself daily and If you gain 3lbs in 3 days, or 5lbs in a week and /or have any swelling or increased swelling in your feet, ankles, and/or stomach call your Health Care Provider. Do not eat or drink foods containing more than 2000 mg of salt (sodium) in your diet every day and limit fluids to 800cc to 1000 cc per day. Please take your METOPROLOL SUCCINATE 25 DAILY. We held your ENTRESTO 24/26 TWICE A DAY AND YOUR IV BUMEX TWICE A DAY due to your worsening kidney function. Please follow up with your PCP/Cardiologist in 1 week from discharge to adjust medications as needed. YOU WILL  BE FOLLOWED UP BY [facility] AT [facility]    Diagnosis: Diabetes  Assessment and Plan of Treatment: You have a history of diabetes for which you take home Tresiba 30 units and have a Cequer insulin pump. Your HbA1c was 11.6 during this admission. Endocrinology was consulted you were placed on an insulin regimen of Lantus 30 units at bedtime and Lispro 10 units 3 times a day. Your sugars in the hospital improved and you transitioned to LANTUS 20 UNITS BEDTIME WITH LOW DOSE INSULIN SLIDING SCALE and LOW DOSE INSULIN SLIDING SCALE THREE TIMES A DAY. You need to continue monitoring your blood sugar levels closely and maintain healthy lifestyle by eating healthy diabetic regimen, weight loss and exercise regularly as tolerated. Please continue to take your LANTUS 20 UNITS BEDTIME WITH LOW DOSE INSULIN SLIDING SCALE and LOW DOSE INSULIN SLIDING SCALE THREE TIMES A DAY. Please follow up with your PCP/Endocrinologist within a week of discharge.    Diagnosis: Systemic inflammatory response syndrome (SIRS)  Assessment and Plan of Treatment: You presented with a fever of 101.5 during your stay. Your urinalysis and respiratory viral panel were negative. Your x-ray of chest and CT of chest showed no pneumonia. ID was consulted and it is believed your source of fever is infection of the skin/soft tissue of your legs. You were started on IV UNASYN 3 GRAMS EVERY 6 HOURS. Please continue this medication on discharge.    Diagnosis: CAD (coronary artery disease)  Assessment and Plan of Treatment: Coronary artery disease is a narrowing of the arteries of your heart caused by a buildup of plaque made of cholesterol. Due to your diabetes and new congestive heart failure, you are at risk for cardiac events. You were started on ASPIRIN 81 DAILY AND ATORVASTATIN 40 MG DAILY. A nuclear stress test was performed and revealed a small-sized, moderate defect(s) in the apical wall suggestive of an infarction. Cardiology was consulted and no interventions were performed at this time. Please continue to take your ASPIRIN 81 DAILY AND ATORVASTATIN 40 MG DAILY as prescribed. Please follow with your PCP/Cardiologist in a week.    Diagnosis: JAMEL (acute kidney injury)  Assessment and Plan of Treatment: JAMEL (acute kidney injury) is a condition in which the kidneys suddenly can't filter waste from the blood. Acute renal failure develops rapidly over a few hours or daysl. It's most common in those who are critically ill and already hospitalized. Symptoms include decreased urinary output, swelling due to fluid retention, nausea, fatigue, and shortness of breath. Sometimes symptoms may be subtle or may not appear at all. In addition to addressing the underlying cause, treatments include fluids, medication, and dialysis. A marker of your kidney function is your serum BUN/Creatinine ratio which elevates when the kidney gets injured. Your Creatinine was elevated. Your baseline Serum Creatine is 0.7-1. Your last serum creatinine level was 1.11 on admission and uptrended to 2.36. Nephrology was consulted and we stopped your IV BUMEX AND ENTRESTO. Once your kidney function improves, you may resume your IV BUMEX and ENTRESTO. Please follow up with your PCP in a week from discharge for further recommendations. You are recommended to follow up with your PCP and Nephrologist in a week from discharge.     PRINCIPAL DISCHARGE DIAGNOSIS  Diagnosis: Critical limb ischemia of right lower extremity  Assessment and Plan of Treatment: You presented with wounds on both of your legs with pain. Podiatry was consulted who initially explained the pain was likely due to fluid overload and exposed skin from the lansed wound. Your x-rays did not show any gas or erosions. Podiatry recommended: xeroform, 4x4 gauze, abd pad, maru, and ACE to be applied to both lower extremities 3 times a week or as necessary if dressings are saturated. Due to worsening pain in your legs, development of a fever during your stay, and cooling of your right lower extremity pain management and vascular surgery was consulted. You are currently taking OXYCODONE 5 MG EVERY 4 HOURS AS NEEDED FOR SEVERE PAIN. Your scans of your lower extremity arteries showed right popliteal artery is occluded with negligible flow of right trifurcation arteries. You were started on a HEPARIN DRIP and DOBUMATINE DRIP. Vascular surgery recommended transferring to [facility] for CO2 angiogram for further imaging of the lower extremitues and surgical procedure evaluation for stenting. It is advised you KEEP COMPRESSION DRESSING, ELEVATE THE LOWER EXTREMITY ABOVE HEART LEVEL AT REST, AMBULATE AS TOLERATED, CONTINUE THE HEPARIN DRIP and DOBUTAMINE DRIP, AND OXYCODONE 5MG EVERY 4 HOURS AS NEEDED FOR SEVERE PAIN.      SECONDARY DISCHARGE DIAGNOSES  Diagnosis: Systemic inflammatory response syndrome (SIRS)  Assessment and Plan of Treatment: You presented with a fever of 101.5 during your stay. Your urinalysis and respiratory viral panel were negative. Your x-ray of chest and CT of chest showed no pneumonia. ID was consulted and it is believed your source of fever is infection of the skin/soft tissue of your legs. You were started on IV UNASYN 3 GRAMS EVERY 6 HOURS. You were transitioned to AUGMENTIN 500 EVERY 12 HOURS starting 1/29/25. Please continue this medication on discharge.    Diagnosis: CHF exacerbation  Assessment and Plan of Treatment: You have history of CHF (congestive heart failure).  On this admission, your CXR showed some fluid in the lungs and your CT chest showed small amount of fluid in both lungs and small amount of fluid around your heart. Cardiology was consulted and you were initially treated with BUMEX IV to help with diuresis which improved your respiratory symptoms and leg swelling. YOU HAD AN ECHOCARDIOGRAM THAT SHOWED NEW REDUCED HEART FUNCTION TO 35%, YOU HAD A STRESS TEST SHOWING A FIXED DEFECT. Please weigh yourself daily and If you gain 3lbs in 3 days, or 5lbs in a week and /or have any swelling or increased swelling in your feet, ankles, and/or stomach call your Health Care Provider. Do not eat or drink foods containing more than 2000 mg of salt (sodium) in your diet every day and limit fluids to 800cc to 1000 cc per day. Please take your METOPROLOL SUCCINATE 25 DAILY. We held your ENTRESTO 24/26 TWICE A DAY AND YOUR IV BUMEX TWICE A DAY due to your worsening kidney function. Please follow up with your PCP/Cardiologist in 1 week from discharge to adjust medications as needed. YOU WILL  BE FOLLOWED UP BY [facility] AT [facility]    Diagnosis: Diabetes  Assessment and Plan of Treatment: You have a history of diabetes for which you take home Tresiba 30 units and have a Cequer insulin pump. Your HbA1c was 11.6 during this admission. Endocrinology was consulted you were placed on an insulin regimen of Lantus 30 units at bedtime and Lispro 10 units 3 times a day. Your sugars in the hospital improved and you transitioned to LANTUS 20 UNITS BEDTIME WITH LOW DOSE INSULIN SLIDING SCALE and LOW DOSE INSULIN SLIDING SCALE THREE TIMES A DAY. You need to continue monitoring your blood sugar levels closely and maintain healthy lifestyle by eating healthy diabetic regimen, weight loss and exercise regularly as tolerated. Please continue to take your LANTUS 20 UNITS BEDTIME WITH LOW DOSE INSULIN SLIDING SCALE and LOW DOSE INSULIN SLIDING SCALE THREE TIMES A DAY. Please follow up with your PCP/Endocrinologist within a week of discharge.    Diagnosis: JAMEL (acute kidney injury)  Assessment and Plan of Treatment: JAMEL (acute kidney injury) is a condition in which the kidneys suddenly can't filter waste from the blood. Acute renal failure develops rapidly over a few hours or daysl. It's most common in those who are critically ill and already hospitalized. Symptoms include decreased urinary output, swelling due to fluid retention, nausea, fatigue, and shortness of breath. Sometimes symptoms may be subtle or may not appear at all. In addition to addressing the underlying cause, treatments include fluids, medication, and dialysis. A marker of your kidney function is your serum BUN/Creatinine ratio which elevates when the kidney gets injured. Your Creatinine was elevated. Your baseline Serum Creatine is 0.7-1. Your last serum creatinine level was 1.11 on admission and uptrended to 2.36. Nephrology was consulted and we stopped your IV BUMEX AND ENTRESTO. Once your kidney function improves, you may resume your IV BUMEX and ENTRESTO. Please follow up with your PCP in a week from discharge for further recommendations. You are recommended to follow up with your PCP and Nephrologist in a week from discharge.    Diagnosis: CAD (coronary artery disease)  Assessment and Plan of Treatment: Coronary artery disease is a narrowing of the arteries of your heart caused by a buildup of plaque made of cholesterol. Due to your diabetes and new congestive heart failure, you are at risk for cardiac events. You were started on ASPIRIN 81 DAILY AND ATORVASTATIN 40 MG DAILY. A nuclear stress test was performed and revealed a small-sized, moderate defect(s) in the apical wall suggestive of an infarction. Cardiology was consulted and no interventions were performed at this time. Please continue to take your ASPIRIN 81 DAILY AND ATORVASTATIN 40 MG DAILY as prescribed. Please follow with your PCP/Cardiologist in a week.     PRINCIPAL DISCHARGE DIAGNOSIS  Diagnosis: Critical limb ischemia of right lower extremity  Assessment and Plan of Treatment: You presented with wounds on both of your legs with pain. Podiatry was consulted who initially explained the pain was likely due to fluid overload and exposed skin from the lansed wound. Your x-rays did not show any gas or erosions. Podiatry recommended: xeroform, 4x4 gauze, abd pad, maru, and ACE to be applied to both lower extremities 3 times a week or as necessary if dressings are saturated. Due to worsening pain in your legs, development of a fever during your stay, and cooling of your right lower extremity pain management and vascular surgery was consulted. You are currently taking OXYCODONE 5 MG EVERY 4 HOURS AS NEEDED FOR SEVERE PAIN. Your scans of your lower extremity arteries showed right popliteal artery is occluded with negligible flow of right trifurcation arteries. You were started on a HEPARIN DRIP and DOBUMATINE DRIP. Vascular surgery recommended transferring to [facility] for CO2 angiogram for further imaging of the lower extremitues and surgical procedure evaluation for stenting. It is advised you KEEP COMPRESSION DRESSING, ELEVATE THE LOWER EXTREMITY ABOVE HEART LEVEL AT REST, AMBULATE AS TOLERATED, CONTINUE THE HEPARIN DRIP and DOBUTAMINE DRIP, AND OXYCODONE 5MG EVERY 4 HOURS AS NEEDED FOR SEVERE PAIN.      SECONDARY DISCHARGE DIAGNOSES  Diagnosis: Systemic inflammatory response syndrome (SIRS)  Assessment and Plan of Treatment: You presented with a fever of 101.5 during your stay. Your urinalysis and respiratory viral panel were negative. Your x-ray of chest and CT of chest showed no pneumonia. ID was consulted and it is believed your source of fever is infection of the skin/soft tissue of your legs. You were started on IV UNASYN 3 GRAMS EVERY 6 HOURS. You were transitioned to AUGMENTIN 500 EVERY 12 HOURS starting 1/29/25. Please continue this medication on discharge.    Diagnosis: CHF exacerbation  Assessment and Plan of Treatment: You have history of CHF (congestive heart failure).  On this admission, your CXR showed some fluid in the lungs and your CT chest showed small amount of fluid in both lungs and small amount of fluid around your heart. Cardiology was consulted and you were initially treated with BUMEX IV to help with diuresis which improved your respiratory symptoms and leg swelling. YOU HAD AN ECHOCARDIOGRAM THAT SHOWED NEW REDUCED HEART FUNCTION TO 35%, YOU HAD A STRESS TEST SHOWING A FIXED DEFECT. Please weigh yourself daily and If you gain 3lbs in 3 days, or 5lbs in a week and /or have any swelling or increased swelling in your feet, ankles, and/or stomach call your Health Care Provider. Do not eat or drink foods containing more than 2000 mg of salt (sodium) in your diet every day and limit fluids to 800cc to 1000 cc per day. Please take your METOPROLOL SUCCINATE 25 DAILY. We held your ENTRESTO 24/26 TWICE A DAY AND YOUR IV BUMEX TWICE A DAY due to your worsening kidney function. Please follow up with your PCP/Cardiologist in 1 week from discharge to adjust medications as needed. YOU WILL  BE FOLLOWED UP BY [facility] AT [facility]    Diagnosis: Diabetes  Assessment and Plan of Treatment: You have a history of diabetes for which you take home Tresiba 30 units and have a Cequer insulin pump. Your HbA1c was 11.6 during this admission. Endocrinology was consulted you were placed on an insulin regimen of Lantus 30 units at bedtime and Lispro 10 units 3 times a day. Your sugars in the hospital improved and you transitioned to LANTUS 20 UNITS BEDTIME WITH LOW DOSE INSULIN SLIDING SCALE and LOW DOSE INSULIN SLIDING SCALE THREE TIMES A DAY. You need to continue monitoring your blood sugar levels closely and maintain healthy lifestyle by eating healthy diabetic regimen, weight loss and exercise regularly as tolerated. Please continue to take your LANTUS 16UNITS BEDTIME WITH LOW DOSE INSULIN SLIDING SCALE and LOW DOSE INSULIN SLIDING SCALE THREE TIMES A DAY.   Upon discharge from the hospital to home, please follow up with your PCP/Endocrinologist (Dr. Garce).    Diagnosis: JAMEL (acute kidney injury)  Assessment and Plan of Treatment: JAMEL (acute kidney injury) is a condition in which the kidneys suddenly can't filter waste from the blood. Acute renal failure develops rapidly over a few hours or daysl. It's most common in those who are critically ill and already hospitalized. Symptoms include decreased urinary output, swelling due to fluid retention, nausea, fatigue, and shortness of breath. Sometimes symptoms may be subtle or may not appear at all. In addition to addressing the underlying cause, treatments include fluids, medication, and dialysis. A marker of your kidney function is your serum BUN/Creatinine ratio which elevates when the kidney gets injured. Your Creatinine was elevated. Your baseline Serum Creatine is 0.7-1. Your last serum creatinine level was 1.11 on admission and uptrended to 2.36. Nephrology was consulted and we stopped your IV BUMEX AND ENTRESTO. Once your kidney function improves, you may resume your IV BUMEX and ENTRESTO. Please follow up with your PCP in a week from discharge for further recommendations. You are recommended to follow up with your PCP and Nephrologist in a week from discharge.    Diagnosis: CAD (coronary artery disease)  Assessment and Plan of Treatment: Coronary artery disease is a narrowing of the arteries of your heart caused by a buildup of plaque made of cholesterol. Due to your diabetes and new congestive heart failure, you are at risk for cardiac events. You were started on ASPIRIN 81 DAILY AND ATORVASTATIN 40 MG DAILY. A nuclear stress test was performed and revealed a small-sized, moderate defect(s) in the apical wall suggestive of an infarction. Cardiology was consulted and no interventions were performed at this time. Please continue to take your ASPIRIN 81 DAILY AND ATORVASTATIN 40 MG DAILY as prescribed. Please follow with your PCP/Cardiologist in a week.

## 2025-01-28 NOTE — PROGRESS NOTE ADULT - SUBJECTIVE AND OBJECTIVE BOX
Subjective/Objective:      MEDS  acetaminophen     Tablet .. 1000 milliGRAM(s) Oral every 8 hours PRN  ampicillin/sulbactam  IVPB 3 Gram(s) IV Intermittent every 6 hours  aspirin  chewable 81 milliGRAM(s) Oral daily  atorvastatin 40 milliGRAM(s) Oral at bedtime  heparin   Injectable 6500 Unit(s) IV Push every 6 hours PRN  heparin   Injectable 3000 Unit(s) IV Push every 6 hours PRN  heparin  Infusion.  Unit(s)/Hr IV Continuous <Continuous>  insulin glargine Injectable (LANTUS) 20 Unit(s) SubCutaneous at bedtime  insulin lispro (ADMELOG) corrective regimen sliding scale   SubCutaneous three times a day before meals  insulin lispro (ADMELOG) corrective regimen sliding scale   SubCutaneous at bedtime  melatonin 3 milliGRAM(s) Oral at bedtime PRN  metoprolol succinate ER 25 milliGRAM(s) Oral daily  ondansetron Injectable 4 milliGRAM(s) IV Push every 8 hours PRN  oxyCODONE    IR 5 milliGRAM(s) Oral every 4 hours PRN  polyethylene glycol 3350 17 Gram(s) Oral daily  senna 2 Tablet(s) Oral at bedtime      PHYSICAL EXAM:    Vital Signs Last 24 Hrs  T(C): 36.6 (28 Jan 2025 08:15), Max: 37.3 (27 Jan 2025 21:43)  T(F): 97.9 (28 Jan 2025 08:15), Max: 99.1 (27 Jan 2025 21:43)  HR: 75 (28 Jan 2025 08:15) (75 - 90)  BP: 114/59 (28 Jan 2025 08:15) (104/59 - 119/66)  BP(mean): 74 (27 Jan 2025 11:16) (74 - 74)  RR: 18 (28 Jan 2025 08:15) (17 - 18)  SpO2: 95% (28 Jan 2025 08:15) (95% - 99%)    Parameters below as of 28 Jan 2025 08:15  Patient On (Oxygen Delivery Method): room air        GEN:    HEENT:    CHEST/Respiratory:    Cardiovascular:    Abdomen:    Genitourinary:    Extremities:     Neurological:    Skin:      LABS/DIAGNOSTIC TESTS                            9.2    13.05 )-----------( 404      ( 28 Jan 2025 07:12 )             26.5       WBC Count: 13.05 K/uL (01-28 @ 07:12)  WBC Count: 12.89 K/uL (01-28 @ 04:32)  WBC Count: 12.42 K/uL (01-27 @ 22:16)  WBC Count: 12.40 K/uL (01-27 @ 06:55)  WBC Count: 13.81 K/uL (01-25 @ 17:44)      01-28    135  |  100  |  63[H]  ----------------------------<  115[H]  4.5   |  24  |  2.36[H]    Ca    8.3[L]      28 Jan 2025 07:12  Phos  4.7     01-28  Mg     2.3     01-28    TPro  7.1  /  Alb  2.5[L]  /  TBili  0.5  /  DBili  x   /  AST  25  /  ALT  29  /  AlkPhos  77  01-27      Urinalysis Basic - ( 28 Jan 2025 07:12 )    Color: x / Appearance: x / SG: x / pH: x  Gluc: 115 mg/dL / Ketone: x  / Bili: x / Urobili: x   Blood: x / Protein: x / Nitrite: x   Leuk Esterase: x / RBC: x / WBC x   Sq Epi: x / Non Sq Epi: x / Bacteria: x        PT/INR - ( 27 Jan 2025 22:16 )   PT: 12.9 sec;   INR: 1.11 ratio         PTT - ( 28 Jan 2025 04:32 )  PTT:87.9 sec      CULTURES      Culture - Urine (collected 01-23-25 @ 14:12)  Source: Clean Catch  Final Report (01-24-25 @ 23:10):    >=3 organisms. Probable collection contamination.    Urinalysis with Rflx Culture (collected 01-23-25 @ 14:12)    Culture - Blood (collected 01-23-25 @ 10:36)  Source: .Blood BLOOD  Preliminary Report (01-27-25 @ 20:01):    No growth at 4 days    Culture - Blood (collected 01-23-25 @ 10:30)  Source: .Blood BLOOD  Preliminary Report (01-27-25 @ 20:01):    No growth at 4 days          RADIOLOGY  < from: US Physiol Extremity Lower 3+ Level, BI (01.27.25 @ 14:28) >    ACC: [id number] EXAM:  US PHYSIOL LWR EXT 3+ LEV BI   ORDERED BY: TED PORFIRIO     PROCEDURE DATE:  01/27/2025          INTERPRETATION:  CLINICAL INDICATION: Diabetes, uncontrolled, cold right   lower extremity    EXAMINATION: Bilateral CHARMAINE PVR evaluation    COMPARISON: 12/16/2024.    FINDINGS:    ABIs are not obtained due to noncompressible vasculature.    Severely abnormal right toe brachial index of 0.08. Moderately abnormal   left toe brachial index of 0.42.    Segmental pressures are not obtained at the calves or ankles due to   calcified noncompressible vasculature. PVR waveforms are nonpulsatile at   the right ankle, right metatarsal, and right digital level, reflecting a   change since 12/16/2024. Limited pulsatility of left metatarsal and   digital medial waveforms. PVR waveforms of the thighs and calves are   pulsatile, though are abnormal in appearance.    Subsequent arterial duplex ultrasound was performed due to PVR findings.    Right leg: Antegrade flow is noted of the right common femoral artery and   superficial femoral artery. Popliteal artery is occluded with negligible   flow of right trifurcation arteries. Correlate for right leg ischemia.    Arterial velocities are tabulated below.  RIGHT/  CFA: 113 cm/s  SFA prox: 57cm/s  SFA mid: 76 cm/s  SFA dist: 67 cm/s  POP: Occluded, 15 cm/s  PTA: Occluded  CAPRICE: 7 cm/s  ALEX: 14 cm/s  DPA: Not imaged    Left leg: Antegrade flow is noted of left common femoral artery,   superficial femoral artery, and popliteal artery. Slightly tardus parvus   waveform of the left popliteal artery is noted, for which underlying   disease cannot be excluded. Posterior tibial artery waveform is   nonpulsatile, reflecting occlusion. Anterior tibial artery tardus parvus   flow is noted. Correlate for left leg ischemia.    Arterial velocities are tabulated below.  LEFT/  CFA: 105 cm/s  SFA: 65 cm/s  POP: 52 cm/s  PTA: occluded  CAPRICE: not imaged  ALEX: 29 cm/s  DPA: not imaged    IMPRESSION:    Limited CHARMAINE-PVR evaluation due to noncompressible vasculature. Subsequent   arterial duplex ultrasound was performed due to abnormal PVR findings,   particularly of the right leg.    Right Leg Arterial Duplex: Popliteal artery is occluded with negligible   flow of right trifurcation arteries. Correlate for right leg ischemia.    Left leg Arterial Duplex: Slightly tardus parvus waveform of the left   popliteal artery is noted, for which underlying disease cannot be   excluded. Posterior tibial artery waveform is nonpulsatile. Anterior   tibial artery tardus parvus flow is noted. Correlate for left leg   ischemia.    Recommend vascular surgery evaluation and runoff CTA for further   evaluation.    Dr. Briones discussed these findings with Dr. Ted Alexander on 1/27/2025 at   9:17 PM, with read back.    --- End of Report ---                         Subjective/Objective ( at the bedside): Pt with signif bilat LE arterial compromise, R>L. Appreciate vasc input.       MEDS  acetaminophen     Tablet .. 1000 milliGRAM(s) Oral every 8 hours PRN  ampicillin/sulbactam  IVPB 3 Gram(s) IV Intermittent every 6 hours (D4)  aspirin  chewable 81 milliGRAM(s) Oral daily  atorvastatin 40 milliGRAM(s) Oral at bedtime  heparin   Injectable 6500 Unit(s) IV Push every 6 hours PRN  heparin   Injectable 3000 Unit(s) IV Push every 6 hours PRN  heparin  Infusion.  Unit(s)/Hr IV Continuous <Continuous>  insulin glargine Injectable (LANTUS) 20 Unit(s) SubCutaneous at bedtime  insulin lispro (ADMELOG) corrective regimen sliding scale   SubCutaneous three times a day before meals  insulin lispro (ADMELOG) corrective regimen sliding scale   SubCutaneous at bedtime  melatonin 3 milliGRAM(s) Oral at bedtime PRN  metoprolol succinate ER 25 milliGRAM(s) Oral daily  ondansetron Injectable 4 milliGRAM(s) IV Push every 8 hours PRN  oxyCODONE    IR 5 milliGRAM(s) Oral every 4 hours PRN  polyethylene glycol 3350 17 Gram(s) Oral daily  senna 2 Tablet(s) Oral at bedtime      PHYSICAL EXAM:    Vital Signs Last 24 Hrs  T(C): 36.6 (28 Jan 2025 08:15), Max: 37.3 (27 Jan 2025 21:43)  T(F): 97.9 (28 Jan 2025 08:15), Max: 99.1 (27 Jan 2025 21:43)  HR: 75 (28 Jan 2025 08:15) (75 - 90)  BP: 114/59 (28 Jan 2025 08:15) (104/59 - 119/66)  BP(mean): 74 (27 Jan 2025 11:16) (74 - 74)  RR: 18 (28 Jan 2025 08:15) (17 - 18)  SpO2: 95% (28 Jan 2025 08:15) (95% - 99%)    Parameters below as of 28 Jan 2025 08:15  Patient On (Oxygen Delivery Method): room air      Gen: obese female in NAD    HEENT: NC/AT; conj. clear    Neck: supple    Chest/Thorax: bibasilar rales    Cardiovascular: S1S2 reg; cd not appreciate any m, g, r    ABD: BS active; soft and non-tender to palpation    Genitourinary: no stiles in place    Extremities: onychomycosis of toenails bilat  LLE: dressing over distal leg and foot; cool to touch proximal to dressing  RLE: dressing over distal leg and foot; cool to touch proximal to dressing    Neurological: A+ox3    Skin: see above    Psychiatric: affect appropriate        LABS/DIAGNOSTIC TESTS                        9.2    13.05 )-----------( 404      ( 28 Jan 2025 07:12 )             26.5       WBC Count: 13.05 K/uL (01-28 @ 07:12)  WBC Count: 12.89 K/uL (01-28 @ 04:32)  WBC Count: 12.42 K/uL (01-27 @ 22:16)  WBC Count: 12.40 K/uL (01-27 @ 06:55)  WBC Count: 13.81 K/uL (01-25 @ 17:44)      01-28    135  |  100  |  63[H]  ----------------------------<  115[H]  4.5   |  24  |  2.36[H]    Ca    8.3[L]      28 Jan 2025 07:12  Phos  4.7     01-28  Mg     2.3     01-28    TPro  7.1  /  Alb  2.5[L]  /  TBili  0.5  /  DBili  x   /  AST  25  /  ALT  29  /  AlkPhos  77  01-27      Urinalysis Basic - ( 28 Jan 2025 07:12 )    Color: x / Appearance: x / SG: x / pH: x  Gluc: 115 mg/dL / Ketone: x  / Bili: x / Urobili: x   Blood: x / Protein: x / Nitrite: x   Leuk Esterase: x / RBC: x / WBC x   Sq Epi: x / Non Sq Epi: x / Bacteria: x        PT/INR - ( 27 Jan 2025 22:16 )   PT: 12.9 sec;   INR: 1.11 ratio         PTT - ( 28 Jan 2025 04:32 )  PTT:87.9 sec      CULTURES      Culture - Urine (collected 01-23-25 @ 14:12)  Source: Clean Catch  Final Report (01-24-25 @ 23:10):    >=3 organisms. Probable collection contamination.    Urinalysis with Rflx Culture (collected 01-23-25 @ 14:12)    Culture - Blood (collected 01-23-25 @ 10:36)  Source: .Blood BLOOD  Preliminary Report (01-27-25 @ 20:01):    No growth at 4 days    Culture - Blood (collected 01-23-25 @ 10:30)  Source: .Blood BLOOD  Preliminary Report (01-27-25 @ 20:01):    No growth at 4 days          RADIOLOGY  < from: US Physiol Extremity Lower 3+ Level, BI (01.27.25 @ 14:28) >    ACC: [id number] EXAM:  US PHYSIOL LWR EXT 3+ LEV BI   ORDERED BY: TED MONROY     PROCEDURE DATE:  01/27/2025          INTERPRETATION:  CLINICAL INDICATION: Diabetes, uncontrolled, cold right   lower extremity    EXAMINATION: Bilateral CHARMAINE PVR evaluation    COMPARISON: 12/16/2024.    FINDINGS:    ABIs are not obtained due to noncompressible vasculature.    Severely abnormal right toe brachial index of 0.08. Moderately abnormal   left toe brachial index of 0.42.    Segmental pressures are not obtained at the calves or ankles due to   calcified noncompressible vasculature. PVR waveforms are nonpulsatile at   the right ankle, right metatarsal, and right digital level, reflecting a   change since 12/16/2024. Limited pulsatility of left metatarsal and   digital medial waveforms. PVR waveforms of the thighs and calves are   pulsatile, though are abnormal in appearance.    Subsequent arterial duplex ultrasound was performed due to PVR findings.    Right leg: Antegrade flow is noted of the right common femoral artery and   superficial femoral artery. Popliteal artery is occluded with negligible   flow of right trifurcation arteries. Correlate for right leg ischemia.    Arterial velocities are tabulated below.  RIGHT/  CFA: 113 cm/s  SFA prox: 57cm/s  SFA mid: 76 cm/s  SFA dist: 67 cm/s  POP: Occluded, 15 cm/s  PTA: Occluded  CAPRICE: 7 cm/s  ALEX: 14 cm/s  DPA: Not imaged    Left leg: Antegrade flow is noted of left common femoral artery,   superficial femoral artery, and popliteal artery. Slightly tardus parvus   waveform of the left popliteal artery is noted, for which underlying   disease cannot be excluded. Posterior tibial artery waveform is   nonpulsatile, reflecting occlusion. Anterior tibial artery tardus parvus   flow is noted. Correlate for left leg ischemia.    Arterial velocities are tabulated below.  LEFT/  CFA: 105 cm/s  SFA: 65 cm/s  POP: 52 cm/s  PTA: occluded  CAPRICE: not imaged  ALEX: 29 cm/s  DPA: not imaged    IMPRESSION:    Limited CHARMAINE-PVR evaluation due to noncompressible vasculature. Subsequent   arterial duplex ultrasound was performed due to abnormal PVR findings,   particularly of the right leg.    Right Leg Arterial Duplex: Popliteal artery is occluded with negligible   flow of right trifurcation arteries. Correlate for right leg ischemia.    Left leg Arterial Duplex: Slightly tardus parvus waveform of the left   popliteal artery is noted, for which underlying disease cannot be   excluded. Posterior tibial artery waveform is nonpulsatile. Anterior   tibial artery tardus parvus flow is noted. Correlate for left leg   ischemia.    Recommend vascular surgery evaluation and runoff CTA for further   evaluation.    Dr. Briones discussed these findings with Dr. Ted Monroy on 1/27/2025 at   9:17 PM, with read back.    --- End of Report ---

## 2025-01-28 NOTE — CONSULT NOTE ADULT - ASSESSMENT
65 y/o f  ambulates independently at baseline, hx of CHF (last TTE on 1/16/25 EF 30-35%, G2DD), DM, diabetic foot ulcer [facility]1/17, p/w worsening right leg swelling and pain. As per patient, from last admission, patient has noticed that right leg has continued to "secrete" fluids soaking and requiring drying every 2-3 hours.   Vascular consult was called because pt is admitting orthopnea, PND, swollen legs, intermittent claudication and rest pain especially on the rt leg. CHARMAINE/PVR studies reveal Severely abnormal right toe brachial index of 0.08. Moderately abnormal left toe brachial index of 0.42.       Severe Arterial Insufficiency   Cont AC if no contraindication   Statins   Wound care consult   Other care/ mgmt per primary team   Will follow

## 2025-01-28 NOTE — CONSULT NOTE ADULT - CONSULT REQUESTED DATE/TIME
27-Jan-2025 22:53
23-Jan-2025 14:34
27-Jan-2025 16:23
26-Jan-2025 10:42
26-Jan-2025 14:32
23-Jan-2025 16:44
25-Jan-2025 12:44

## 2025-01-28 NOTE — PROGRESS NOTE ADULT - ASSESSMENT
64F, hx of CHF (last TTE on 1/16/25 EF 30-35%, G2DD), DM, diabetic foot ulcer with a recent admission at [facility] for CHF exacerbation 1/14-1/17 p/w worsening R. leg pain and fluid secretion, was meeting sepsis criteria and admitted for continued management of CHF exacerbation      # SIRS (systemic inflammatory response syndrome).   # HF   # CAD  abx per Primary team  -  Metoprolol Succ 25 qD. Not on Entresto due to insurance issues  -c/w Metoprolol Succ 25 qD  -Resume Entresto 24/26 BID  -aspirin 81 qD  -Atorvastatin 40  - CONT BYMEX    - - Nuclear stress tesT  - NOT to be ischemic, no cardiac cath indicated at this time.

## 2025-01-28 NOTE — DISCHARGE NOTE PROVIDER - NSDCFUSCHEDAPPT_GEN_ALL_CORE_FT
[facility]  [address] [facility]  Scheduled Appointment: 02/07/2025    [facility]  [address]TRASND [address] [facility]  Scheduled Appointment: 02/07/2025     Bannazadeh, Mohsen  [facility] Physician [facility]  VASCULAR [address]  Scheduled Appointment: 01/31/2025    [facility] Physician [facility]  [address] [facility]  Scheduled Appointment: 02/07/2025    [facility]  [address] [facility]  Scheduled Appointment: 02/07/2025

## 2025-01-28 NOTE — PROGRESS NOTE ADULT - SUBJECTIVE AND OBJECTIVE BOX
[facility] NEPHROLOGY- PROGRESS NOTE    Patient is a 65yo Female with CHF (last TTE on 1/16/25 EF 30-35%, G2DD), DM, diabetic foot ulcer with a recent admission at [facility] for CHF exacerbation 1/14-1/17 p/w RLE swelling/ blister with pain. Pt a/w RLE soft tissue infection, and  diuresed for CHF. Pt with JAMEL for which Bumex/ Entresto held. Pt found to have cool RLE and is pending CTA aorta with runoff. Nephrology consulted for Elevated serum creatinine.    Hospital Medications: Medications reviewed.  REVIEW OF SYSTEMS:  CONSTITUTIONAL: No fevers or chills  RESPIRATORY: +shortness of breath +orthopnea  CARDIOVASCULAR: No chest pain.  GASTROINTESTINAL: No nausea, vomiting, diarrhea or abdominal pain.   VASCULAR: +bilateral lower extremity edema. +sharp RLE pain    VITALS:  T(F): 98.1 (01-28-25 @ 11:30), Max: 99.1 (01-27-25 @ 21:43)  HR: 73 (01-28-25 @ 11:30)  BP: 109/55 (01-28-25 @ 11:30)  RR: 19 (01-28-25 @ 11:30)  SpO2: 99% (01-28-25 @ 11:30)  Wt(kg): --  Height (cm): 165.1 (01-23 @ 09:50), 165.1 (01-14 @ 11:11)  Weight (kg): 78.3 (01-23 @ 09:50), 80.7 (01-14 @ 11:11)  BMI (kg/m2): 28.7 (01-23 @ 09:50), 29.6 (01-14 @ 11:11)  BSA (m2): 1.86 (01-23 @ 09:50), 1.88 (01-14 @ 11:11)    01-27 @ 07:01  -  01-28 @ 07:00  --------------------------------------------------------  IN: 880 mL / OUT: 0 mL / NET: 880 mL    01-28 @ 07:01  -  01-28 @ 12:01  --------------------------------------------------------  IN: 0 mL / OUT: 100 mL / NET: -100 mL      PHYSICAL EXAM:  Gen: NAD, calm  HEENT: anicteric  Neck: no JVD  Cards: RRR, +S1/S2, no M/G/R  Resp: bibasilar rales  GI: soft, NT/ND, NABS  : no CVA tenderness  Extremities: + 2 LE edema B/L, b/l LE wrapped  Derm: +left hallux dry gangrene    LABS:  01-28    135  |  100  |  63[H]  ----------------------------<  115[H]  4.5   |  24  |  2.36[H]    Ca    8.3[L]      28 Jan 2025 07:12  Phos  4.7     01-28  Mg     2.3     01-28    TPro  7.1  /  Alb  2.5[L]  /  TBili  0.5  /  DBili      /  AST  25  /  ALT  29  /  AlkPhos  77  01-27    Creatinine Trend: 2.36 <--, 1.55 <--, 1.25 <--, 1.25 <--, 1.17 <--, 1.11 <--                        9.2    13.05 )-----------( 404      ( 28 Jan 2025 07:12 )             26.5     Urine Studies:  Urinalysis Basic - ( 28 Jan 2025 07:12 )    Color:  / Appearance:  / SG:  / pH:   Gluc: 115 mg/dL / Ketone:   / Bili:  / Urobili:    Blood:  / Protein:  / Nitrite:    Leuk Esterase:  / RBC:  / WBC    Sq Epi:  / Non Sq Epi:  / Bacteria:       Sodium, Random Urine: 10 mmol/L (01-27 @ 13:10)  Potassium, Random Urine: 43 mmol/L (01-27 @ 13:10)  Creatinine, Random Urine: 122 mg/dL (01-27 @ 13:10)  Protein/Creatinine Ratio Calculation: 0.2 Ratio (01-27 @ 13:10)    RADIOLOGY & ADDITIONAL STUDIES:

## 2025-01-28 NOTE — PROGRESS NOTE ADULT - SUBJECTIVE AND OBJECTIVE BOX
INTERVAL HPI/OVERNIGHT EVENTS:    Pt seen and examined at bedside. No new complaints. Denies any increased pain, numbness or tingling in RLE.     Vital Signs Last 24 Hrs  T(C): 36.7 (28 Jan 2025 11:30), Max: 37.3 (27 Jan 2025 21:43)  T(F): 98.1 (28 Jan 2025 11:30), Max: 99.1 (27 Jan 2025 21:43)  HR: 73 (28 Jan 2025 11:30) (73 - 90)  BP: 109/55 (28 Jan 2025 11:30) (104/59 - 119/66)  BP(mean): --  RR: 19 (28 Jan 2025 11:30) (17 - 19)  SpO2: 99% (28 Jan 2025 11:30) (95% - 99%)    Parameters below as of 28 Jan 2025 11:30  Patient On (Oxygen Delivery Method): room air      I&O's Detail    27 Jan 2025 07:01  -  28 Jan 2025 07:00  --------------------------------------------------------  IN:    Oral Fluid: 880 mL  Total IN: 880 mL    OUT:  Total OUT: 0 mL    Total NET: 880 mL      28 Jan 2025 07:01  -  28 Jan 2025 11:54  --------------------------------------------------------  IN:  Total IN: 0 mL    OUT:    Voided (mL): 100 mL  Total OUT: 100 mL    Total NET: -100 mL        ampicillin/sulbactam  IVPB 3 Gram(s) IV Intermittent every 6 hours  polyethylene glycol 3350 17 Gram(s) Oral daily  senna 2 Tablet(s) Oral at bedtime      Physical Exam  General: No acute distress  Skin: No jaundice, no icterus  Extremities: non edematous, no calf pain bilaterally    Drains/Tubes:     Labs:                        9.2    13.05 )-----------( 404      ( 28 Jan 2025 07:12 )             26.5     01-28    135  |  100  |  63[H]  ----------------------------<  115[H]  4.5   |  24  |  2.36[H]    Ca    8.3[L]      28 Jan 2025 07:12  Phos  4.7     01-28  Mg     2.3     01-28    TPro  7.1  /  Alb  2.5[L]  /  TBili  0.5  /  DBili  x   /  AST  25  /  ALT  29  /  AlkPhos  77  01-27    PT/INR - ( 27 Jan 2025 22:16 )   PT: 12.9 sec;   INR: 1.11 ratio         PTT - ( 28 Jan 2025 11:18 )  PTT:83.9 sec    RADIOLOGY & ADDITIONAL STUDIES:    64yFemale    < from: US Physiol Extremity Lower 3+ Level, BI (01.27.25 @ 14:28) >    ACC: [id number] EXAM:  US PHYSIOL LWR EXT 3+ LEV BI   ORDERED BY: TED MONROY     PROCEDURE DATE:  01/27/2025          INTERPRETATION:  CLINICAL INDICATION: Diabetes, uncontrolled, cold right   lower extremity    EXAMINATION: Bilateral CHARMAINE PVR evaluation    COMPARISON: 12/16/2024.    FINDINGS:    ABIs are not obtained due to noncompressible vasculature.    Severely abnormal right toe brachial index of 0.08. Moderately abnormal   left toe brachial index of 0.42.    Segmental pressures are not obtained at the calves or ankles due to   calcified noncompressible vasculature. PVR waveforms are nonpulsatile at   the right ankle, right metatarsal, and right digital level, reflecting a   change since 12/16/2024. Limited pulsatility of left metatarsal and   digital medial waveforms. PVR waveforms of the thighs and calves are   pulsatile, though are abnormal in appearance.    Subsequent arterial duplex ultrasound was performed due to PVR findings.    Right leg: Antegrade flow is noted of the right common femoral artery and   superficial femoral artery. Popliteal artery is occluded with negligible   flow of right trifurcation arteries. Correlate for right leg ischemia.    Arterial velocities are tabulated below.  RIGHT/  CFA: 113 cm/s  SFA prox: 57cm/s  SFA mid: 76 cm/s  SFA dist: 67 cm/s  POP: Occluded, 15 cm/s  PTA: Occluded  CAPRICE: 7 cm/s  ALEX: 14 cm/s  DPA: Not imaged    Left leg: Antegrade flow is noted of left common femoral artery,   superficial femoral artery, and popliteal artery. Slightly tardus parvus   waveform of the left popliteal artery is noted, for which underlying   disease cannot be excluded. Posterior tibial artery waveform is   nonpulsatile, reflecting occlusion. Anterior tibial artery tardus parvus   flow is noted. Correlate for left leg ischemia.    Arterial velocities are tabulated below.  LEFT/  CFA: 105 cm/s  SFA: 65 cm/s  POP: 52 cm/s  PTA: occluded  CAPRICE: not imaged  ALEX: 29 cm/s  DPA: not imaged    IMPRESSION:    Limited CHARMAINE-PVR evaluation due to noncompressible vasculature. Subsequent   arterial duplex ultrasound was performed due to abnormal PVR findings,   particularly of the right leg.    Right Leg Arterial Duplex: Popliteal artery is occluded with negligible   flow of right trifurcation arteries. Correlate for right leg ischemia.    Left leg Arterial Duplex: Slightly tardus parvus waveform of the left   popliteal artery is noted, for which underlying disease cannot be   excluded. Posterior tibial artery waveform is nonpulsatile. Anterior   tibial artery tardus parvus flow is noted. Correlate for left leg   ischemia.    Recommend vascular surgery evaluation and runoff CTA for further   evaluation.    Dr. Briones discussed these findings with Dr. Ted Monroy on 1/27/2025 at   9:17 PM, with read back.    --- End of Report ---            VLADIMIR BRIONES M.D., ATTENDING RADIOLOGIST  This document has been electronically signed. Jan 27 2025  9:19PM    < end of copied text >   INTERVAL HPI/OVERNIGHT EVENTS:    Pt seen and examined at bedside. No new complaints. Denies any increased pain, numbness or tingling in RLE.     Vital Signs Last 24 Hrs  T(C): 36.7 (28 Jan 2025 11:30), Max: 37.3 (27 Jan 2025 21:43)  T(F): 98.1 (28 Jan 2025 11:30), Max: 99.1 (27 Jan 2025 21:43)  HR: 73 (28 Jan 2025 11:30) (73 - 90)  BP: 109/55 (28 Jan 2025 11:30) (104/59 - 119/66)  BP(mean): --  RR: 19 (28 Jan 2025 11:30) (17 - 19)  SpO2: 99% (28 Jan 2025 11:30) (95% - 99%)    Parameters below as of 28 Jan 2025 11:30  Patient On (Oxygen Delivery Method): room air      I&O's Detail    27 Jan 2025 07:01  -  28 Jan 2025 07:00  --------------------------------------------------------  IN:    Oral Fluid: 880 mL  Total IN: 880 mL    OUT:  Total OUT: 0 mL    Total NET: 880 mL      28 Jan 2025 07:01  -  28 Jan 2025 11:54  --------------------------------------------------------  IN:  Total IN: 0 mL    OUT:    Voided (mL): 100 mL  Total OUT: 100 mL    Total NET: -100 mL        ampicillin/sulbactam  IVPB 3 Gram(s) IV Intermittent every 6 hours  polyethylene glycol 3350 17 Gram(s) Oral daily  senna 2 Tablet(s) Oral at bedtime      Physical Exam  General: No acute distress  Skin: No jaundice, no icterus  Extremities: B/L dressings in place to LE's. RLE cool from midcalf distally, sensation in tact to light touch.   LLE warm and sensation in tact to light touch.     Drains/Tubes:     Labs:                        9.2    13.05 )-----------( 404      ( 28 Jan 2025 07:12 )             26.5     01-28    135  |  100  |  63[H]  ----------------------------<  115[H]  4.5   |  24  |  2.36[H]    Ca    8.3[L]      28 Jan 2025 07:12  Phos  4.7     01-28  Mg     2.3     01-28    TPro  7.1  /  Alb  2.5[L]  /  TBili  0.5  /  DBili  x   /  AST  25  /  ALT  29  /  AlkPhos  77  01-27    PT/INR - ( 27 Jan 2025 22:16 )   PT: 12.9 sec;   INR: 1.11 ratio         PTT - ( 28 Jan 2025 11:18 )  PTT:83.9 sec    RADIOLOGY & ADDITIONAL STUDIES:    64yFemale    < from: US Physiol Extremity Lower 3+ Level, BI (01.27.25 @ 14:28) >    ACC: [id number] EXAM:  US PHYSIOL LWR EXT 3+ LEV BI   ORDERED BY: TED MONROY     PROCEDURE DATE:  01/27/2025          INTERPRETATION:  CLINICAL INDICATION: Diabetes, uncontrolled, cold right   lower extremity    EXAMINATION: Bilateral CHARMAINE PVR evaluation    COMPARISON: 12/16/2024.    FINDINGS:    ABIs are not obtained due to noncompressible vasculature.    Severely abnormal right toe brachial index of 0.08. Moderately abnormal   left toe brachial index of 0.42.    Segmental pressures are not obtained at the calves or ankles due to   calcified noncompressible vasculature. PVR waveforms are nonpulsatile at   the right ankle, right metatarsal, and right digital level, reflecting a   change since 12/16/2024. Limited pulsatility of left metatarsal and   digital medial waveforms. PVR waveforms of the thighs and calves are   pulsatile, though are abnormal in appearance.    Subsequent arterial duplex ultrasound was performed due to PVR findings.    Right leg: Antegrade flow is noted of the right common femoral artery and   superficial femoral artery. Popliteal artery is occluded with negligible   flow of right trifurcation arteries. Correlate for right leg ischemia.    Arterial velocities are tabulated below.  RIGHT/  CFA: 113 cm/s  SFA prox: 57cm/s  SFA mid: 76 cm/s  SFA dist: 67 cm/s  POP: Occluded, 15 cm/s  PTA: Occluded  CAPRICE: 7 cm/s  ALEX: 14 cm/s  DPA: Not imaged    Left leg: Antegrade flow is noted of left common femoral artery,   superficial femoral artery, and popliteal artery. Slightly tardus parvus   waveform of the left popliteal artery is noted, for which underlying   disease cannot be excluded. Posterior tibial artery waveform is   nonpulsatile, reflecting occlusion. Anterior tibial artery tardus parvus   flow is noted. Correlate for left leg ischemia.    Arterial velocities are tabulated below.  LEFT/  CFA: 105 cm/s  SFA: 65 cm/s  POP: 52 cm/s  PTA: occluded  CAPRICE: not imaged  ALEX: 29 cm/s  DPA: not imaged    IMPRESSION:    Limited CHARMAINE-PVR evaluation due to noncompressible vasculature. Subsequent   arterial duplex ultrasound was performed due to abnormal PVR findings,   particularly of the right leg.    Right Leg Arterial Duplex: Popliteal artery is occluded with negligible   flow of right trifurcation arteries. Correlate for right leg ischemia.    Left leg Arterial Duplex: Slightly tardus parvus waveform of the left   popliteal artery is noted, for which underlying disease cannot be   excluded. Posterior tibial artery waveform is nonpulsatile. Anterior   tibial artery tardus parvus flow is noted. Correlate for left leg   ischemia.    Recommend vascular surgery evaluation and runoff CTA for further   evaluation.    Dr. Briones discussed these findings with Dr. Ted Monroy on 1/27/2025 at   9:17 PM, with read back.    --- End of Report ---            VLADIMIR BRIONES M.D., ATTENDING RADIOLOGIST  This document has been electronically signed. Jan 27 2025  9:19PM    < end of copied text >

## 2025-01-28 NOTE — DISCHARGE NOTE PROVIDER - ATTENDING DISCHARGE PHYSICAL EXAMINATION:
S: her breathing feels slightly better today  O:  Vital Signs Last 24 Hrs  T(C): 36.6 (01-29-25 @ 11:16), Max: 36.9 (01-29-25 @ 05:06)  T(F): 97.9 (01-29-25 @ 11:16), Max: 98.4 (01-29-25 @ 05:06)  HR: 81 (01-29-25 @ 11:16) (81 - 94)  BP: 95/68 (01-29-25 @ 11:16) (95/68 - 122/58)  RR: 18 (01-29-25 @ 11:16) (18 - 19)  SpO2: 96% (01-29-25 @ 11:16) (94% - 100%)    PE:  Gen: Oriented, alert, No acute distress Eyes: conjunctivae and lid normal, sclera clear with no icterus ENT: nose and throat exam normal CVS: S1, S2, RRR; no murmur, no rubs, no gallops Pulm: Good air exchange, Breath sounds equal bilaterally, rales,  no wheezes Chest: nontender, no chest deformity, chest movement symmetrical Gl: abdomen soft, nontender, nondistended, bowel sounds normoactive, no masses palpated Musk: no msk pain, 2+ pitting edema BLE, Right toes cool to touch Skin: no skin lesions, skin turgor normal, warm and well perfused Neuro: Awake, alert,Psych: normal affect, insight

## 2025-01-28 NOTE — DISCHARGE NOTE PROVIDER - HOSPITAL COURSE
64F, hx of CHF (last TTE on 1/16/25 EF 30-35%, G2DD), DM, diabetic foot ulcer with a recent admission at [facility] for CHF exacerbation 1/14-1/17 p/w worsening R. leg pain and fluid secretion, was meeting sepsis criteria with podiatry having low suspicion for right cellulitis (xrays showing no gas or erosion) and admitted for continued management of CHF exacerbation with CXR showing bilateral effusions. Cardiology was consulted and patient was started on IV BUMEX BID. TTE on previous admission: LVSF moderately decreased w/EF 30-35%. Moderate G2DD. Mild MR. Ischemic evaluation was recommended for which patient undergone stress test which revealed a small-sized moderate defect in the apical wall that is predominantly fixed suggestive of an infarction with minimal marcus-infarct ischemia. No interventions were needed at this time. Patient was continued on their home Metoprolol Succ 25 ER and restarted Entresto 24/26 BID (unable to obtain home due to insurance issues). Due to history of DM & CHF, patient is at risk for cardiac events and was started on aspirin and statin. A1c was found to be 11.6 for which patient was resumed on her previous insulin regimen on admission (Lantus 30u + low dose ISS, Lispro 10u TID + low dose ISS). BG was controlled and insulin regimen deescalated (Lantus 20u + low dose ISS, low dose ISS TID. Patient had worsening leg pain for which pain management consulted: started on pain regimen and currently taking Oxycodone 5 mg PO q6h PRN. Patient developed fever of 101.5 for which UA, RVP negative. CT chest showed small bilateral pleural effusions and small pericardial effusion. ID consulted and started on Unasyn 3g q12h. Patient developed JAMEL (baseline SCr 0.7-1), nephro consulted, attributed to combination of Unasyn with Bumex and Entresto. Bumex and Entresto stopped. Vascular consulted for continued leg pain and recommended imaging: Right Leg Arterial Duplex: Popliteal artery is occluded with negligible flow of right trifurcation arteries. Left leg Arterial Duplex: Slightly tardus parvus waveform of the left popliteal artery is noted, for which underlying disease cannot be excluded. Posterior tibial artery waveform is nonpulsatile. Anterior tibial artery tardus parvus flow is noted. Patient started on Heparin and Dobutamine drip with transfer to [facility] for further management    Patient is able to ambulate and tolerate diet prior to discharge. Patient is stable for discharge per attending and is advised to follow up with PCP as outpatient. Please refer to patient's complete medical chart with documents for a full hospital course, for this is only a brief summary.    64F, hx of CHF (last TTE on 1/16/25 EF 30-35%, G2DD), DM, diabetic foot ulcer with a recent admission at [facility] for CHF exacerbation 1/14-1/17 p/w worsening R. leg pain and fluid secretion, was meeting sepsis criteria with podiatry having low suspicion for right cellulitis (xrays showing no gas or erosion) and admitted for continued management of CHF exacerbation with CXR showing bilateral effusions. Cardiology was consulted and patient was started on IV BUMEX BID. TTE on previous admission: LVSF moderately decreased w/EF 30-35%. Moderate G2DD. Mild MR. Ischemic evaluation was recommended for which patient undergone stress test which revealed a small-sized moderate defect in the apical wall that is predominantly fixed suggestive of an infarction with minimal marcus-infarct ischemia. No interventions were needed at this time. Patient was continued on their home Metoprolol Succ 25 ER and restarted Entresto 24/26 BID (unable to obtain home due to insurance issues). Due to history of DM & CHF, patient is at risk for cardiac events and was started on aspirin and statin. A1c was found to be 11.6 for which patient was resumed on her previous insulin regimen on admission (Lantus 30u + low dose ISS, Lispro 10u TID + low dose ISS). BG was controlled and insulin regimen deescalated (Lantus 20u + low dose ISS, low dose ISS TID. Patient had worsening leg pain for which pain management consulted: started on pain regimen and currently taking Oxycodone 5 mg PO q6h PRN. Patient developed fever of 101.5 for which UA, RVP negative. CT chest showed small bilateral pleural effusions and small pericardial effusion. ID consulted and started on Unasyn 3g q12h. Patient developed JAMEL (baseline SCr 0.7-1), nephro consulted, attributed to combination of Unasyn with Bumex and Entresto. Bumex and Entresto stopped. Vascular consulted for continued leg pain and recommended imaging: Right Leg Arterial Duplex: Popliteal artery is occluded with negligible flow of right trifurcation arteries. Left leg Arterial Duplex: Slightly tardus parvus waveform of the left popliteal artery is noted, for which underlying disease cannot be excluded. Posterior tibial artery waveform is nonpulsatile. Anterior tibial artery tardus parvus flow is noted. Patient started on Heparin and Dobutamine drip with transfer to [facility] for further management. Will transition to Augment 500 q12h starting 1/29/25    Patient is able to ambulate and tolerate diet prior to discharge. Patient is stable for discharge per attending and is advised to follow up with PCP as outpatient. Please refer to patient's complete medical chart with documents for a full hospital course, for this is only a brief summary.

## 2025-01-28 NOTE — PROGRESS NOTE ADULT - SUBJECTIVE AND OBJECTIVE BOX
PGY-1 Progress Note discussed with attending    PAGER #: [[phone]] TILL 5:00 PM  PLEASE CONTACT ON CALL TEAM:  - On Call Team (Please refer to Makenna) FROM 5:00 PM - 8:30PM  - Nightfloat Team FROM 8:30 -7:30 AM      INTERVAL HPI/OVERNIGHT EVENTS: No overnight events. Doppler of arteries revealed right popliteal occlusion    Patient c/o weakness, SOB, feeling worse, lethargic, decreased appetite but endorses these symptoms started 2d ago. Denies any CP, chills, N/V    ---------------------------    REVIEW OF SYSTEMS:    MEDICATIONS  (STANDING):  aspirin  chewable 81 milliGRAM(s) Oral daily  atorvastatin 40 milliGRAM(s) Oral at bedtime  DOBUTamine Infusion 2.5 MICROgram(s)/kG/Min (5.87 mL/Hr) IV Continuous <Continuous>  heparin  Infusion.  Unit(s)/Hr (14 mL/Hr) IV Continuous <Continuous>  insulin glargine Injectable (LANTUS) 20 Unit(s) SubCutaneous at bedtime  insulin lispro (ADMELOG) corrective regimen sliding scale   SubCutaneous three times a day before meals  insulin lispro (ADMELOG) corrective regimen sliding scale   SubCutaneous at bedtime  metoprolol succinate ER 25 milliGRAM(s) Oral daily  polyethylene glycol 3350 17 Gram(s) Oral daily  senna 2 Tablet(s) Oral at bedtime    MEDICATIONS  (PRN):  acetaminophen     Tablet .. 1000 milliGRAM(s) Oral every 8 hours PRN Moderate Pain (4 - 6)  heparin   Injectable 6500 Unit(s) IV Push every 6 hours PRN For aPTT less than 40  heparin   Injectable 3000 Unit(s) IV Push every 6 hours PRN For aPTT between 40 - 57  melatonin 3 milliGRAM(s) Oral at bedtime PRN Insomnia  ondansetron Injectable 4 milliGRAM(s) IV Push every 8 hours PRN Nausea and/or Vomiting  oxyCODONE    IR 5 milliGRAM(s) Oral every 6 hours PRN Severe Pain (7 - 10)      Vital Signs Last 24 Hrs  T(C): 36.5 (28 Jan 2025 16:11), Max: 37.3 (27 Jan 2025 21:43)  T(F): 97.7 (28 Jan 2025 16:11), Max: 99.1 (27 Jan 2025 21:43)  HR: 90 (28 Jan 2025 16:11) (73 - 90)  BP: 120/57 (28 Jan 2025 16:11) (104/59 - 120/57)  BP(mean): --  RR: 19 (28 Jan 2025 16:11) (18 - 19)  SpO2: 100% (28 Jan 2025 16:11) (95% - 100%)    Parameters below as of 28 Jan 2025 16:11  Patient On (Oxygen Delivery Method): room air        -----------------------------    PHYSICAL EXAMINATION:  GENERAL: NAD, well built  HEAD:  Atraumatic, Normocephalic  EYES:  conjunctiva and sclera clear  CHEST/LUNG: B/l crackles heard at mid-lower lungs   HEART: Regular rate and rhythm; No murmurs, rubs, or gallops  ABDOMEN: Soft, Nontender, Nondistended; Bowel sounds present, no pain or masses on palpation  NERVOUS SYSTEM:  Alert & Oriented X3  EXTREMITIES: Wrapped in ACE bandage                          9.2    13.05 )-----------( 404      ( 28 Jan 2025 07:12 )             26.5     01-28    135  |  100  |  63[H]  ----------------------------<  115[H]  4.5   |  24  |  2.36[H]    Ca    8.3[L]      28 Jan 2025 07:12  Phos  4.7     01-28  Mg     2.3     01-28    TPro  7.1  /  Alb  2.5[L]  /  TBili  0.5  /  DBili  x   /  AST  25  /  ALT  29  /  AlkPhos  77  01-27    LIVER FUNCTIONS - ( 27 Jan 2025 06:55 )  Alb: 2.5 g/dL / Pro: 7.1 g/dL / ALK PHOS: 77 U/L / ALT: 29 U/L DA / AST: 25 U/L / GGT: x               PT/INR - ( 27 Jan 2025 22:16 )   PT: 12.9 sec;   INR: 1.11 ratio         PTT - ( 28 Jan 2025 11:18 )  PTT:83.9 sec    I&O's Summary    27 Jan 2025 07:01  -  28 Jan 2025 07:00  --------------------------------------------------------  IN: 880 mL / OUT: 0 mL / NET: 880 mL    28 Jan 2025 07:01  -  28 Jan 2025 18:13  --------------------------------------------------------  IN: 0 mL / OUT: 100 mL / NET: -100 mL            CAPILLARY BLOOD GLUCOSE      RADIOLOGY & ADDITIONAL TESTS:

## 2025-01-28 NOTE — PROGRESS NOTE ADULT - ASSESSMENT
64F, hx of CHF (last TTE on 1/16/25 EF 30-35%, G2DD), DM, diabetic foot ulcer with a recent admission at [facility] for CHF exacerbation 1/14-1/17 p/w worsening R. leg pain and fluid secretion, was meeting sepsis criteria with podiatry having low suspicion for right cellulitis and admitted for continued management of CHF exacerbation and needing ischemic eval. pending CTA aorta with runoff, CHARMAINE/PVR.

## 2025-01-28 NOTE — PROGRESS NOTE ADULT - NS ATTEND AMEND GEN_ALL_CORE FT
bilateral CLTI  CHF  CKD  medical management   pt will need angiogram
CKD  CHF  CLTI  pt ultrasound reviewed  severe bilateral below knee disease  pt to be transferred to [facility] for CO2 angiogram  I had a long conversation with the patient and her brother   risk of limb loss was discussed  no grantees were implied
Seen this AM, she was upset that she went to stress without communication.  I apologized and explained that stress was ordered after discussion with cardiology and since stress not available over weekend, had to be ordered.    PE:No distress, speaking complete sentences without pause. her lung sounds diminished more so on the right, she has bilateral legs wrapped, her toes are shiny and edematous.    Assessment and Plan:   Patient is a 65 yo woman with PMH significant for CHFrEF DM2 c/b diabetic foot ulcer who presents with shortness of breath, cough, orthopnea admitted for continued heart failure exacerbation.     #Acute HFrEF exacerbation  #Diabetic Foot ulcer  #DM2  #Transient Fever  #Coronary Artery Disease      Change furosemide to IV bumex 1mg bid, check i+os, daily weights  - prior to discharge ideally should be monitored on maintenance PO medications before DC  New decreased EF- patient previously declined cath and NST  - Nuclear stress test performed 1/24- has evidence of ischemia  She needs a cardiac cath. She is willing to undergo it after further diuresis.  - Hx of CAD, new decreased EF should at minimum be on aspirin, start high intensity statin in setting of CAD AND DM  -endocrine consult for DM management- resume insulin from previous admission  -monitor tele while undergoing aggressive diuresis  - further chart review, patient declined stress test and cath- will reintroduce  -podiatry consulted, her wounds are due to fluid overload- discussed with pod, they do not believe her legs are cellulitic, they appear improved from last admission.  Patient noted with one episode of fever checked rectally. She does not display any signs and symptoms of any infection. Will DEFER antibiotics for now, continue watching fever, trend wbc, follow blood cultres, may ask ID to followup. procal negative, no further episodes of fever

## 2025-01-28 NOTE — PROGRESS NOTE ADULT - PROBLEM SELECTOR PLAN 3
Hx of DM on home Tresiba 30u, Cequer insulin pump  Endo consulted, Dr. Bhagat  A1c 11.6  Restarted on previous admission insulin regimen on admission: Lantus 30u bedtime+low ISS, Lispro 10u TID+low ISS    -decrease lantus to 20u + ISS ACHS  -f/u Endo recs

## 2025-01-28 NOTE — PROGRESS NOTE ADULT - SUBJECTIVE AND OBJECTIVE BOX
MR#[id number]  PATIENT NAME:COLE ALBA    DATE OF SERVICE: 01-28-25 @ 0930  Patient was seen and examined by Yordy Gilmore MD on    01-28-25 @ 0930  Interim events noted.Consultant notes ,Labs,Telemetry reviewed by me       HOSPITAL COURSE: HPI:  64F, ambulates independently at baseline, hx of CHF (last TTE on 1/16/25 EF 30-35%, G2DD), DM, diabetic foot ulcer with a recent admission at [facility] for CHF exacerbation 1/14-1/17, p/w worsening right leg swelling and pain. As per patient, from last admission, patient has noticed that right leg has continued to "secrete" fluids soaking and requiring drying every 2-3 hours. There is increased pain for which patient took Tylenol with no relief and she is unable to ambulate now. She endorses improvement in her left leg and can bear weight on that with no issues. She denies having any history of fever, redness, hot to touch of the right leg since her previous admission. Patient endorses her home medication of furosemide 40 daily is not helping and describes her urine output to be "decreased" and prefers the IV lasix. She continues to endorse orthopnea and shortness of breath w/ exertion. Inquiry about if swelling has progressed to her stomach, she denies saying it is mid-thigh bilaterally only. When inquired about her home CHF medications, she endorses that the Entresto has not been approved by insurance yet so she is currently not taking it.  (23 Jan 2025 16:21)      INTERIM EVENTS:Patient seen at bedside ,interim events noted.      PMH -reviewed admission note, no change since admission  HEART FAILURE: Acute[ ]Chronic[ ] Systolic[ ] Diastolic[ ] Combined Systolic and Diastolic[ ]  CAD[ ] CABG[ ] PCI[ ]  DEVICES[ ] PPM[ ] ICD[ ] ILR[ ]  ATRIAL FIBRILLATION[ ] Paroxysmal[ ] Permanent[ ] CHADS2-[  ]  JAMEL[ ] CKD1[ ] CKD2[ ] CKD3[ ] CKD4[ ] ESRD[ ]  COPD[ ] HTN[ ]   DM[ ] Type1[ ] Type 2[ ]   CVA[ ] Paresis[ ]    AMBULATION: Assisted[ ] Cane/walker[ ] Independent[ ]    MEDICATIONS  (STANDING):  aspirin  chewable 81 milliGRAM(s) Oral daily  atorvastatin 40 milliGRAM(s) Oral at bedtime  DOBUTamine Infusion 2.5 MICROgram(s)/kG/Min (5.87 mL/Hr) IV Continuous <Continuous>  heparin  Infusion.  Unit(s)/Hr (14 mL/Hr) IV Continuous <Continuous>  insulin glargine Injectable (LANTUS) 20 Unit(s) SubCutaneous at bedtime  insulin lispro (ADMELOG) corrective regimen sliding scale   SubCutaneous three times a day before meals  insulin lispro (ADMELOG) corrective regimen sliding scale   SubCutaneous at bedtime  metoprolol succinate ER 25 milliGRAM(s) Oral daily  polyethylene glycol 3350 17 Gram(s) Oral daily  senna 2 Tablet(s) Oral at bedtime    MEDICATIONS  (PRN):  acetaminophen     Tablet .. 1000 milliGRAM(s) Oral every 8 hours PRN Moderate Pain (4 - 6)  heparin   Injectable 6500 Unit(s) IV Push every 6 hours PRN For aPTT less than 40  heparin   Injectable 3000 Unit(s) IV Push every 6 hours PRN For aPTT between 40 - 57  melatonin 3 milliGRAM(s) Oral at bedtime PRN Insomnia  ondansetron Injectable 4 milliGRAM(s) IV Push every 8 hours PRN Nausea and/or Vomiting  oxyCODONE    IR 5 milliGRAM(s) Oral every 6 hours PRN Severe Pain (7 - 10)            REVIEW OF SYSTEMS:  Constitutional: [ ] fever, [ ]weight loss,  [ ]fatigue [ ]weight gain  Eyes: [ ] visual changes  Respiratory: [ ]shortness of breath;  [ ] cough, [ ]wheezing, [ ]chills, [ ]hemoptysis  Cardiovascular: [ ] chest pain, [ ]palpitations, [ ]dizziness,  [ ]leg swelling[ ]orthopnea[ ]PND  Gastrointestinal: [ ] abdominal pain, [ ]nausea, [ ]vomiting,  [ ]diarrhea [ ]Constipation [ ]Melena  Genitourinary: [ ] dysuria, [ ] hematuria [ ][place]  Neurologic: [ ] headaches [ ] tremors[ ]weakness [ ]Paralysis Right[ ] Left[ ]  Skin: [ ] itching, [ ]burning, [ ] rashes  Endocrine: [ ] heat or cold intolerance  Musculoskeletal: [ ] joint pain or swelling; [ ] muscle, back, or extremity pain  Psychiatric: [ ] depression, [ ]anxiety, [ ]mood swings, or [ ]difficulty sleeping  Hematologic: [ ] easy bruising, [ ] bleeding gums    [ ] All remaining systems negative except as per above.   [ ]Unable to obtain.  [x] No change in ROS since admission      Vital Signs Last 24 Hrs  T(C): 36.7 (28 Jan 2025 19:54), Max: 37.2 (27 Jan 2025 23:54)  T(F): 98.1 (28 Jan 2025 19:54), Max: 99 (27 Jan 2025 23:54)  HR: 94 (28 Jan 2025 19:54) (73 - 94)  BP: 106/89 (28 Jan 2025 19:54) (106/89 - 120/57)  BP(mean): --  RR: 18 (28 Jan 2025 19:54) (18 - 19)  SpO2: 94% (28 Jan 2025 19:54) (94% - 100%)    Parameters below as of 28 Jan 2025 19:54  Patient On (Oxygen Delivery Method): room air      I&O's Summary    27 Jan 2025 07:01  -  28 Jan 2025 07:00  --------------------------------------------------------  IN: 880 mL / OUT: 0 mL / NET: 880 mL    28 Jan 2025 07:01  -  28 Jan 2025 21:57  --------------------------------------------------------  IN: 200 mL / OUT: 550 mL / NET: -350 mL        PHYSICAL EXAM:  General: No acute distress BMI-  HEENT: EOMI, PERRL  Neck: Supple, [ ] JVD  Lungs: Equal air entry bilaterally; [ ] rales [ ] wheezing [ ] rhonchi  Heart: Regular rate and rhythm; [x ] murmur   2/6 [ x] systolic [ ] diastolic [ ] radiation[ ] rubs [ ]  gallops  Abdomen: Nontender, bowel sounds present  Extremities: No clubbing, cyanosis, [ ] edema [ ]Pulses  equal and intact  Nervous system:  Alert & Oriented X3, no focal deficits  Psychiatric: Normal affect  Skin: No rashes or lesions    LABS:  01-28    135  |  100  |  63[H]  ----------------------------<  115[H]  4.5   |  24  |  2.36[H]    Ca    8.3[L]      28 Jan 2025 07:12  Phos  4.7     01-28  Mg     2.3     01-28    TPro  7.1  /  Alb  2.5[L]  /  TBili  0.5  /  DBili  x   /  AST  25  /  ALT  29  /  AlkPhos  77  01-27    Creatinine Trend: 2.36<--, 1.55<--, 1.25<--, 1.25<--, 1.17<--, 1.11<--                        9.2    13.05 )-----------( 404      ( 28 Jan 2025 07:12 )             26.5     PT/INR - ( 27 Jan 2025 22:16 )   PT: 12.9 sec;   INR: 1.11 ratio         PTT - ( 28 Jan 2025 11:18 )  PTT:83.9 sec    < from: TTE W or WO Ultrasound Enhancing Agent (01.16.25 @ 12:45) >  CONCLUSIONS:      1. Left ventricular systolic function is moderately decreased with an ejection fraction visually estimated at 30 to 35 %.   2. There is moderate (grade 2) left ventricular diastolic dysfunction.   3. Mild mitral regurgitation.   4. Trace tricuspid regurgitation.   5. Trace pulmonic regurgitation.   6. Trace pericardial effusion.   7. Right pleural effusion noted.   8. No prior echocardiogram is available for comparison.    < end of copied text >

## 2025-01-28 NOTE — PROGRESS NOTE ADULT - ASSESSMENT
64F with hx of CHF (last TTE on 1/16/25 EF 30-35%, G2DD), DM, diabetic foot ulcer and a recent admission at [facility] for CHF exacerbation 1/14-1/17, p/w worsening right leg swelling and pain. As per patient, from last admission, patient noticed that right leg has continued to "secrete" fluids, requiring drying every 2-3 hours, and assoc increased pain for which patient took Tylenol with no relief and inability to ambulate. She endorses improvement in her left leg. She denies having any history of fever, redness, or warmth to touch. Patient endorses her home medication of furosemide 40 daily is not helping, describes her urine output to be "decreased" and prefers IV lasix. She continues to endorse orthopnea and GARG. When asked about her home CHF medications, she endorses that the Entresto has not been approved by insurance yet so she is currently not taking it.  (23 Jan 2025 16:21). ID asked to evaluate pt b/o fever and elevated WBC. At the time of my consult, pt says she has pain in both legs/feet R>L primarily over open areas. Says the LE swelling is better since adm. Pt was started on ampi/sulbactam b/o concern for SSTI. On exam, evidence of SSTI bilat with great concern for vascular compromise of RLE (cold to touch), with LLE cool as well to touch. CHARMAINE/PVRs today: significant arterial compromise to LE R>L. Pt to be transferred to [facility] for further w/u and management.      #Skin/ soft tissue infection of distal legs/feet in the presence of LE edema. G+ C are most likely etiology. However, of greater concern is thrombosis/embolus to the LE  --d/c ampi/sulbactam  --Augmentin 500mg p.o. bid for additional 6d    #DFI (see above)-- L great toe with dry gangrene    #CHF (reduced EF)-- management as per the medical team    #DM-- management as per medical team      Chart reviewed, including notes/labs/imaging; bedside examination; discussion with the patient and her  re Dx/management of SSTI and arterial compromise of the bilat LE; provided coordination of care re ID issues with the medical team.    Please call again if needed.

## 2025-01-29 ENCOUNTER — TRANSCRIPTION ENCOUNTER (OUTPATIENT)
Age: 65
End: 2025-01-29

## 2025-01-29 ENCOUNTER — INPATIENT (INPATIENT)
Facility: HOSPITAL | Age: 65
LOS: 56 days | Discharge: NURSING, REHAB OR LONG-TERM CARE | DRG: 558 | End: 2025-03-27
Attending: INTERNAL MEDICINE | Admitting: SURGERY
Payer: COMMERCIAL

## 2025-01-29 VITALS
SYSTOLIC BLOOD PRESSURE: 124 MMHG | DIASTOLIC BLOOD PRESSURE: 52 MMHG | OXYGEN SATURATION: 98 % | RESPIRATION RATE: 19 BRPM | TEMPERATURE: 98 F | HEART RATE: 89 BPM

## 2025-01-29 VITALS
SYSTOLIC BLOOD PRESSURE: 117 MMHG | OXYGEN SATURATION: 96 % | TEMPERATURE: 98 F | RESPIRATION RATE: 18 BRPM | HEART RATE: 96 BPM | DIASTOLIC BLOOD PRESSURE: 70 MMHG

## 2025-01-29 DIAGNOSIS — H26.9 UNSPECIFIED CATARACT: Chronic | ICD-10-CM

## 2025-01-29 DIAGNOSIS — M62.242: ICD-10-CM

## 2025-01-29 DIAGNOSIS — Z90.49 ACQUIRED ABSENCE OF OTHER SPECIFIED PARTS OF DIGESTIVE TRACT: Chronic | ICD-10-CM

## 2025-01-29 PROBLEM — I50.9 HEART FAILURE, UNSPECIFIED: Chronic | Status: ACTIVE | Noted: 2025-01-23

## 2025-01-29 PROBLEM — I25.10 ATHEROSCLEROTIC HEART DISEASE OF NATIVE CORONARY ARTERY WITHOUT ANGINA PECTORIS: Chronic | Status: ACTIVE | Noted: 2025-01-23

## 2025-01-29 LAB
ALBUMIN SERPL ELPH-MCNC: 2.4 G/DL — LOW (ref 3.5–5)
ALP SERPL-CCNC: 83 U/L — SIGNIFICANT CHANGE UP (ref 40–120)
ALT FLD-CCNC: 31 U/L DA — SIGNIFICANT CHANGE UP (ref 10–60)
ANION GAP SERPL CALC-SCNC: 15 MMOL/L — SIGNIFICANT CHANGE UP (ref 5–17)
APPEARANCE UR: ABNORMAL
APTT BLD: 68.5 SEC — HIGH (ref 24.5–35.6)
AST SERPL-CCNC: 21 U/L — SIGNIFICANT CHANGE UP (ref 10–40)
BACTERIA # UR AUTO: NEGATIVE /HPF — SIGNIFICANT CHANGE UP
BILIRUB SERPL-MCNC: 0.4 MG/DL — SIGNIFICANT CHANGE UP (ref 0.2–1.2)
BILIRUB UR-MCNC: NEGATIVE — SIGNIFICANT CHANGE UP
BUN SERPL-MCNC: 69 MG/DL — HIGH (ref 7–18)
CALCIUM SERPL-MCNC: 8.6 MG/DL — SIGNIFICANT CHANGE UP (ref 8.4–10.5)
CHLORIDE SERPL-SCNC: 97 MMOL/L — SIGNIFICANT CHANGE UP (ref 96–108)
CO2 SERPL-SCNC: 22 MMOL/L — SIGNIFICANT CHANGE UP (ref 22–31)
COLOR SPEC: YELLOW — SIGNIFICANT CHANGE UP
CREAT ?TM UR-MCNC: 102 MG/DL — SIGNIFICANT CHANGE UP
CREAT SERPL-MCNC: 3.6 MG/DL — HIGH (ref 0.5–1.3)
DIFF PNL FLD: ABNORMAL
EGFR: 14 ML/MIN/1.73M2 — LOW
EPI CELLS # UR: PRESENT
GLUCOSE BLDC GLUCOMTR-MCNC: 134 MG/DL — HIGH (ref 70–99)
GLUCOSE BLDC GLUCOMTR-MCNC: 85 MG/DL — SIGNIFICANT CHANGE UP (ref 70–99)
GLUCOSE BLDC GLUCOMTR-MCNC: 89 MG/DL — SIGNIFICANT CHANGE UP (ref 70–99)
GLUCOSE BLDC GLUCOMTR-MCNC: 90 MG/DL — SIGNIFICANT CHANGE UP (ref 70–99)
GLUCOSE SERPL-MCNC: 81 MG/DL — SIGNIFICANT CHANGE UP (ref 70–99)
GLUCOSE UR QL: NEGATIVE MG/DL — SIGNIFICANT CHANGE UP
HCT VFR BLD CALC: 26.8 % — LOW (ref 34.5–45)
HGB BLD-MCNC: 9.4 G/DL — LOW (ref 11.5–15.5)
KETONES UR-MCNC: ABNORMAL MG/DL
LEUKOCYTE ESTERASE UR-ACNC: NEGATIVE — SIGNIFICANT CHANGE UP
MAGNESIUM SERPL-MCNC: 2.3 MG/DL — SIGNIFICANT CHANGE UP (ref 1.6–2.6)
MCHC RBC-ENTMCNC: 25.1 PG — LOW (ref 27–34)
MCHC RBC-ENTMCNC: 35.1 G/DL — SIGNIFICANT CHANGE UP (ref 32–36)
MCV RBC AUTO: 71.5 FL — LOW (ref 80–100)
NITRITE UR-MCNC: NEGATIVE — SIGNIFICANT CHANGE UP
NRBC # BLD: 0 /100 WBCS — SIGNIFICANT CHANGE UP (ref 0–0)
NRBC BLD-RTO: 0 /100 WBCS — SIGNIFICANT CHANGE UP (ref 0–0)
PH UR: 5 — SIGNIFICANT CHANGE UP (ref 5–8)
PHOSPHATE SERPL-MCNC: 5.4 MG/DL — HIGH (ref 2.5–4.5)
PLATELET # BLD AUTO: 396 K/UL — SIGNIFICANT CHANGE UP (ref 150–400)
POTASSIUM SERPL-MCNC: 4.5 MMOL/L — SIGNIFICANT CHANGE UP (ref 3.5–5.3)
POTASSIUM SERPL-SCNC: 4.5 MMOL/L — SIGNIFICANT CHANGE UP (ref 3.5–5.3)
PROT SERPL-MCNC: 7.5 G/DL — SIGNIFICANT CHANGE UP (ref 6–8.3)
PROT UR-MCNC: 30 MG/DL
RBC # BLD: 3.75 M/UL — LOW (ref 3.8–5.2)
RBC # FLD: 15.2 % — HIGH (ref 10.3–14.5)
RBC CASTS # UR COMP ASSIST: 0 /HPF — SIGNIFICANT CHANGE UP (ref 0–4)
SODIUM SERPL-SCNC: 134 MMOL/L — LOW (ref 135–145)
SP GR SPEC: 1.02 — SIGNIFICANT CHANGE UP (ref 1–1.03)
UROBILINOGEN FLD QL: 0.2 MG/DL — SIGNIFICANT CHANGE UP (ref 0.2–1)
WBC # BLD: 11.48 K/UL — HIGH (ref 3.8–10.5)
WBC # FLD AUTO: 11.48 K/UL — HIGH (ref 3.8–10.5)
WBC UR QL: 3 /HPF — SIGNIFICANT CHANGE UP (ref 0–5)

## 2025-01-29 PROCEDURE — 84443 ASSAY THYROID STIM HORMONE: CPT

## 2025-01-29 PROCEDURE — 73630 X-RAY EXAM OF FOOT: CPT

## 2025-01-29 PROCEDURE — 87637 SARSCOV2&INF A&B&RSV AMP PRB: CPT

## 2025-01-29 PROCEDURE — 82962 GLUCOSE BLOOD TEST: CPT

## 2025-01-29 PROCEDURE — 86140 C-REACTIVE PROTEIN: CPT

## 2025-01-29 PROCEDURE — 87086 URINE CULTURE/COLONY COUNT: CPT

## 2025-01-29 PROCEDURE — 83880 ASSAY OF NATRIURETIC PEPTIDE: CPT

## 2025-01-29 PROCEDURE — 82570 ASSAY OF URINE CREATININE: CPT

## 2025-01-29 PROCEDURE — 78452 HT MUSCLE IMAGE SPECT MULT: CPT | Mod: MC

## 2025-01-29 PROCEDURE — 71046 X-RAY EXAM CHEST 2 VIEWS: CPT

## 2025-01-29 PROCEDURE — 84133 ASSAY OF URINE POTASSIUM: CPT

## 2025-01-29 PROCEDURE — 85652 RBC SED RATE AUTOMATED: CPT

## 2025-01-29 PROCEDURE — 85025 COMPLETE CBC W/AUTO DIFF WBC: CPT

## 2025-01-29 PROCEDURE — 82340 ASSAY OF CALCIUM IN URINE: CPT

## 2025-01-29 PROCEDURE — 96374 THER/PROPH/DIAG INJ IV PUSH: CPT

## 2025-01-29 PROCEDURE — 73590 X-RAY EXAM OF LOWER LEG: CPT

## 2025-01-29 PROCEDURE — 85730 THROMBOPLASTIN TIME PARTIAL: CPT

## 2025-01-29 PROCEDURE — 84156 ASSAY OF PROTEIN URINE: CPT

## 2025-01-29 PROCEDURE — 71250 CT THORAX DX C-: CPT | Mod: MC

## 2025-01-29 PROCEDURE — 85027 COMPLETE CBC AUTOMATED: CPT

## 2025-01-29 PROCEDURE — 83036 HEMOGLOBIN GLYCOSYLATED A1C: CPT

## 2025-01-29 PROCEDURE — 93017 CV STRESS TEST TRACING ONLY: CPT

## 2025-01-29 PROCEDURE — 80053 COMPREHEN METABOLIC PANEL: CPT

## 2025-01-29 PROCEDURE — 83735 ASSAY OF MAGNESIUM: CPT

## 2025-01-29 PROCEDURE — 84540 ASSAY OF URINE/UREA-N: CPT

## 2025-01-29 PROCEDURE — 84300 ASSAY OF URINE SODIUM: CPT

## 2025-01-29 PROCEDURE — 93005 ELECTROCARDIOGRAM TRACING: CPT

## 2025-01-29 PROCEDURE — 99239 HOSP IP/OBS DSCHRG MGMT >30: CPT | Mod: GC

## 2025-01-29 PROCEDURE — A9502: CPT

## 2025-01-29 PROCEDURE — 36415 COLL VENOUS BLD VENIPUNCTURE: CPT

## 2025-01-29 PROCEDURE — 80061 LIPID PANEL: CPT

## 2025-01-29 PROCEDURE — 84100 ASSAY OF PHOSPHORUS: CPT

## 2025-01-29 PROCEDURE — 84145 PROCALCITONIN (PCT): CPT

## 2025-01-29 PROCEDURE — 87040 BLOOD CULTURE FOR BACTERIA: CPT

## 2025-01-29 PROCEDURE — 93923 UPR/LXTR ART STDY 3+ LVLS: CPT

## 2025-01-29 PROCEDURE — 81001 URINALYSIS AUTO W/SCOPE: CPT

## 2025-01-29 PROCEDURE — 80048 BASIC METABOLIC PNL TOTAL CA: CPT

## 2025-01-29 PROCEDURE — 99285 EMERGENCY DEPT VISIT HI MDM: CPT | Mod: 25

## 2025-01-29 PROCEDURE — 96375 TX/PRO/DX INJ NEW DRUG ADDON: CPT

## 2025-01-29 PROCEDURE — 83605 ASSAY OF LACTIC ACID: CPT

## 2025-01-29 PROCEDURE — 85610 PROTHROMBIN TIME: CPT

## 2025-01-29 RX ORDER — DOBUTAMINE 250 MG/20ML
2.5 INJECTION INTRAVENOUS
Qty: 500 | Refills: 0 | Status: DISCONTINUED | OUTPATIENT
Start: 2025-01-29 | End: 2025-01-31

## 2025-01-29 RX ORDER — GLUCAGON 3 MG/1
1 POWDER NASAL ONCE
Refills: 0 | Status: DISCONTINUED | OUTPATIENT
Start: 2025-01-29 | End: 2025-03-17

## 2025-01-29 RX ORDER — INSULIN GLARGINE-YFGN 100 [IU]/ML
16 INJECTION, SOLUTION SUBCUTANEOUS AT BEDTIME
Refills: 0 | Status: DISCONTINUED | OUTPATIENT
Start: 2025-01-29 | End: 2025-01-29

## 2025-01-29 RX ORDER — ACETAMINOPHEN 500 MG/5ML
650 LIQUID (ML) ORAL EVERY 8 HOURS
Refills: 0 | Status: DISCONTINUED | OUTPATIENT
Start: 2025-01-29 | End: 2025-02-03

## 2025-01-29 RX ORDER — INSULIN GLARGINE-YFGN 100 [IU]/ML
16 INJECTION, SOLUTION SUBCUTANEOUS
Qty: 0 | Refills: 0 | DISCHARGE
Start: 2025-01-29

## 2025-01-29 RX ORDER — DEXTROSE 50 % IN WATER 50 %
25 SYRINGE (ML) INTRAVENOUS ONCE
Refills: 0 | Status: DISCONTINUED | OUTPATIENT
Start: 2025-01-29 | End: 2025-03-17

## 2025-01-29 RX ORDER — AMOXICILLIN AND CLAVULANATE POTASSIUM 500; 125 MG/1; MG/1
1 TABLET, FILM COATED ORAL EVERY 12 HOURS
Refills: 0 | Status: DISCONTINUED | OUTPATIENT
Start: 2025-01-29 | End: 2025-02-01

## 2025-01-29 RX ORDER — BISACODYL 5 MG
5 TABLET, DELAYED RELEASE (ENTERIC COATED) ORAL ONCE
Refills: 0 | Status: COMPLETED | OUTPATIENT
Start: 2025-01-29 | End: 2025-01-29

## 2025-01-29 RX ORDER — POLYETHYLENE GLYCOL 3350 17 G/17G
17 POWDER, FOR SOLUTION ORAL DAILY
Refills: 0 | Status: DISCONTINUED | OUTPATIENT
Start: 2025-01-29 | End: 2025-03-17

## 2025-01-29 RX ORDER — OXYCODONE HYDROCHLORIDE 30 MG/1
1 TABLET ORAL
Qty: 0 | Refills: 0 | DISCHARGE
Start: 2025-01-29

## 2025-01-29 RX ORDER — MELATONIN 5 MG
3 TABLET ORAL AT BEDTIME
Refills: 0 | Status: DISCONTINUED | OUTPATIENT
Start: 2025-01-29 | End: 2025-03-17

## 2025-01-29 RX ORDER — SODIUM CHLORIDE 9 G/1000ML
1000 INJECTION, SOLUTION INTRAVENOUS
Refills: 0 | Status: DISCONTINUED | OUTPATIENT
Start: 2025-01-29 | End: 2025-03-17

## 2025-01-29 RX ORDER — DEXTROSE 50 % IN WATER 50 %
15 SYRINGE (ML) INTRAVENOUS ONCE
Refills: 0 | Status: DISCONTINUED | OUTPATIENT
Start: 2025-01-29 | End: 2025-03-17

## 2025-01-29 RX ORDER — DEXTROSE 50 % IN WATER 50 %
12.5 SYRINGE (ML) INTRAVENOUS ONCE
Refills: 0 | Status: DISCONTINUED | OUTPATIENT
Start: 2025-01-29 | End: 2025-03-17

## 2025-01-29 RX ORDER — ONDANSETRON HCL/PF 4 MG/2 ML
4 VIAL (ML) INJECTION EVERY 6 HOURS
Refills: 0 | Status: DISCONTINUED | OUTPATIENT
Start: 2025-01-29 | End: 2025-03-17

## 2025-01-29 RX ORDER — ATORVASTATIN CALCIUM 80 MG/1
40 TABLET, FILM COATED ORAL AT BEDTIME
Refills: 0 | Status: DISCONTINUED | OUTPATIENT
Start: 2025-01-29 | End: 2025-03-17

## 2025-01-29 RX ORDER — INSULIN GLARGINE-YFGN 100 [IU]/ML
16 INJECTION, SOLUTION SUBCUTANEOUS AT BEDTIME
Refills: 0 | Status: DISCONTINUED | OUTPATIENT
Start: 2025-01-29 | End: 2025-02-03

## 2025-01-29 RX ORDER — SENNA 187 MG
2 TABLET ORAL AT BEDTIME
Refills: 0 | Status: DISCONTINUED | OUTPATIENT
Start: 2025-01-29 | End: 2025-03-17

## 2025-01-29 RX ORDER — SODIUM CHLORIDE 9 G/1000ML
1000 INJECTION, SOLUTION INTRAVENOUS
Refills: 0 | Status: DISCONTINUED | OUTPATIENT
Start: 2025-01-29 | End: 2025-01-30

## 2025-01-29 RX ORDER — HEPARIN SODIUM 1000 [USP'U]/ML
14 INJECTION INTRAVENOUS; SUBCUTANEOUS
Qty: 25000 | Refills: 0 | Status: DISCONTINUED | OUTPATIENT
Start: 2025-01-29 | End: 2025-02-03

## 2025-01-29 RX ORDER — METOPROLOL SUCCINATE 50 MG/1
25 TABLET, EXTENDED RELEASE ORAL DAILY
Refills: 0 | Status: DISCONTINUED | OUTPATIENT
Start: 2025-01-29 | End: 2025-02-11

## 2025-01-29 RX ORDER — INSULIN LISPRO 100 U/ML
INJECTION, SOLUTION INTRAVENOUS; SUBCUTANEOUS
Refills: 0 | Status: DISCONTINUED | OUTPATIENT
Start: 2025-01-29 | End: 2025-03-17

## 2025-01-29 RX ORDER — OXYCODONE HYDROCHLORIDE 30 MG/1
5 TABLET ORAL EVERY 6 HOURS
Refills: 0 | Status: DISCONTINUED | OUTPATIENT
Start: 2025-01-29 | End: 2025-02-05

## 2025-01-29 RX ADMIN — Medication 25 MILLIGRAM(S): at 06:00

## 2025-01-29 RX ADMIN — HEPARIN SODIUM 14 UNIT(S)/HR: 1000 INJECTION INTRAVENOUS; SUBCUTANEOUS at 22:51

## 2025-01-29 RX ADMIN — Medication 1400 UNIT(S)/HR: at 07:33

## 2025-01-29 RX ADMIN — ATORVASTATIN CALCIUM 40 MILLIGRAM(S): 80 TABLET, FILM COATED ORAL at 22:57

## 2025-01-29 RX ADMIN — Medication 1400 UNIT(S)/HR: at 08:47

## 2025-01-29 RX ADMIN — POLYETHYLENE GLYCOL 3350 17 GRAM(S): 17 POWDER, FOR SOLUTION ORAL at 12:17

## 2025-01-29 RX ADMIN — INSULIN GLARGINE-YFGN 16 UNIT(S): 100 INJECTION, SOLUTION SUBCUTANEOUS at 23:46

## 2025-01-29 RX ADMIN — AMOXICILLIN AND CLAVULANATE POTASSIUM 1 TABLET(S): 200; 28.5 POWDER, FOR SUSPENSION ORAL at 06:00

## 2025-01-29 RX ADMIN — ASPIRIN 81 MILLIGRAM(S): 81 TABLET, COATED ORAL at 12:18

## 2025-01-29 RX ADMIN — ACETAMINOPHEN 1000 MILLIGRAM(S): 160 SUSPENSION ORAL at 16:53

## 2025-01-29 RX ADMIN — Medication 5 MILLIGRAM(S): at 08:19

## 2025-01-29 RX ADMIN — Medication 650 MILLIGRAM(S): at 21:34

## 2025-01-29 RX ADMIN — Medication 1400 UNIT(S)/HR: at 11:49

## 2025-01-29 NOTE — PROGRESS NOTE ADULT - NUTRITIONAL ASSESSMENT
Diet, Regular:   Consistent Carbohydrate {Evening Snacks}  DASH/TLC {Sodium & Cholesterol Restricted}  1000mL Fluid Restriction ([id number])  Enmanuel (01-25-25 @ 17:36) [Active]
Diet, Regular:   Consistent Carbohydrate {Evening Snacks}  DASH/TLC {Sodium & Cholesterol Restricted}  1000mL Fluid Restriction ([id number])  Enmanuel (01-25-25 @ 17:36) [Active]
Diet, Regular:   Consistent Carbohydrate {Evening Snacks}  DASH/TLC {Sodium & Cholesterol Restricted}  1000mL Fluid Restriction ([id number])  Enmanuel (01-25-25 @ 17:36) [Active]

## 2025-01-29 NOTE — PROGRESS NOTE ADULT - PROBLEM SELECTOR PROBLEM 5
Prophylactic measure
CAD (coronary artery disease)
Prophylactic measure
Prophylactic measure

## 2025-01-29 NOTE — DISCHARGE NOTE NURSING/CASE MANAGEMENT/SOCIAL WORK - FINANCIAL ASSISTANCE
[facility] provides services at a reduced cost to those who are determined to be eligible through [facility]’s financial assistance program. Information regarding [facility]’s financial assistance program can be found by going to [email or web address] or by calling [phone].

## 2025-01-29 NOTE — PROGRESS NOTE ADULT - SUBJECTIVE AND OBJECTIVE BOX
[facility] NEPHROLOGY- PROGRESS NOTE    Patient is a 63yo Female with CHF (last TTE on 25 EF 30-35%, G2DD), DM, diabetic foot ulcer with a recent admission at [facility] for CHF exacerbation - p/w RLE swelling/ blister with pain. Pt a/w RLE soft tissue infection, and  diuresed for CHF. Pt with JAMEL for which Bumex/ Entresto held. Pt found to have cool RLE and is pending CTA aorta with runoff. Nephrology consulted for Elevated serum creatinine.    Hospital Medications: Medications reviewed.  REVIEW OF SYSTEMS:  CONSTITUTIONAL: No fevers or chills  RESPIRATORY: +shortness of breath +orthopnea  CARDIOVASCULAR: No chest pain.  GASTROINTESTINAL: No nausea, vomiting, diarrhea or abdominal pain. +constipated with decreased PO intake  VASCULAR: +bilateral lower extremity edema. +sharp RLE pain    VITALS:  T(F): 97.7 (25 @ 15:29), Max: 98.4 (25 @ 05:06)  HR: 89 (25 @ 15:29)  BP: 124/52 (25 @ 15:29)  RR: 19 (25 @ 15:29)  SpO2: 98% (25 @ 15:29)  Wt(kg): --     @ 07: @ 07:00  --------------------------------------------------------  IN: 200 mL / OUT: 550 mL / NET: -350 mL     @ 07: @ 18:26  --------------------------------------------------------  IN: 418.8 mL / OUT: 150 mL / NET: 268.8 mL      PHYSICAL EXAM:  Gen: NAD, calm  HEENT: anicteric  Neck: no JVD  Cards: RRR, +S1/S2, no M/G/R  Resp: bibasilar rales  GI: soft, NT/ND, NABS  : no CVA tenderness  Extremities: + 2 LE edema B/L, b/l LE wrapped  Derm: +left hallux dry gangrene    LABS:      134[L]  |  97  |  69[H]  ----------------------------<  81  4.5   |  22  |  3.60[H]    Ca    8.6      2025 07:22  Phos  5.4       Mg     2.3         TPro  7.5  /  Alb  2.4[L]  /  TBili  0.4  /  DBili      /  AST  21  /  ALT  31  /  AlkPhos  83      Creatinine Trend: 3.60 <--, 2.36 <--, 1.55 <--, 1.25 <--, 1.25 <--, 1.17 <--, 1.11 <--                        9.4    11.48 )-----------( 396      ( 2025 07:22 )             26.8     Urine Studies:  Urinalysis Basic - ( 2025 11:53 )    Color: Yellow / Appearance: Turbid / S.019 / pH:   Gluc:  / Ketone: Trace mg/dL  / Bili: Negative / Urobili: 0.2 mg/dL   Blood:  / Protein: 30 mg/dL / Nitrite: Negative   Leuk Esterase: Negative / RBC: 0 /HPF / WBC 3 /HPF   Sq Epi:  / Non Sq Epi:  / Bacteria: Negative /HPF      Creatinine, Random Urine: 102 mg/dL ( @ 11:53)  Sodium, Random Urine: 10 mmol/L ( @ 13:10)  Potassium, Random Urine: 43 mmol/L ( @ 13:10)  Creatinine, Random Urine: 122 mg/dL ( @ 13:10)  Protein/Creatinine Ratio Calculation: 0.2 Ratio ( @ 13:10)  Calcium, Random Urine: 1.0 mg/dL ( @ 13:10)

## 2025-01-29 NOTE — PROGRESS NOTE ADULT - ASSESSMENT
64F, hx of CHF (last TTE on 1/16/25 EF 30-35%, G2DD), DM, diabetic foot ulcer with a recent admission at [facility] for CHF exacerbation 1/14-1/17 p/w worsening R. leg pain and fluid secretion, was meeting sepsis criteria with podiatry having low suspicion for right cellulitis and admitted for continued management of CHF exacerbation and needing ischemic eval. pending CTA aorta with runoff, CHARMAINE/PVR.

## 2025-01-29 NOTE — PROGRESS NOTE ADULT - ASSESSMENT
Patient is a 63yo Female with CHF (last TTE on 1/16/25 EF 30-35%, G2DD), DM, diabetic foot ulcer with a recent admission at [facility] for CHF exacerbation 1/14-1/17 p/w RLE swelling/ blister with pain. Pt a/w RLE soft tissue infection, and  diuresed for CHF. Pt with JAMEL for which Bumex/ Entresto held. Pt found to have cool RLE and is pending CTA aorta with runoff. Nephrology consulted for Elevated serum creatinine.      1. JAMEL- alvaro SCr 0.7-1; JAMEL in the setting of ampicillin/ sulbactam use with Bumex/ Entresto. Entresto and Bumex d/c 1/27. Pt with worsening renal function in the setting of hypotension despite Dobutamine gtt. JAMEL likely ATN in the setting of hypotension with infection/ ampicillin/ sulbactam use. Check UA and FeUrea. Check Renal US to r/o obstruction.   Recc to hold off on CTA until renal function improves. Recc Mucomyst 1200mg PO bid x 4 doses (2 doses pre contrast and 2 doses post contrast) and hold diuretics  to minimize risk of LILIAN.  Strict I/Os. Avoid nephrotoxins/ NSAIDs/ RCA. Monitor BMP.    2. Essential HTN-low normal BP. Consider decreasing Metoprolol ER to avoid hypotension. Entresto held due to JAMEL. c/w low salt diet. Monitor BP    3. CHF with fluid overload- once renal function improves and is stable post CTA; recc aggressive diuresis. Strict I/Os. Daily weights    4. Skin/ soft tissue infection- Lower extremities. Now off ampicillin/ sulbactam; which can also cause JAMEL and switched to Augmentin. Plan as per [facility] NEPHROLOGY  Raji Waterman M.D.  Mars Feliz D.O.  Kelley Parks M.D.  MD Nancy Kulkarni, MSN, ANP-C    Telephone: [phone]  Facsimile: [phone]    [facility] [place] [address], [address]  [place] [address]

## 2025-01-29 NOTE — PROGRESS NOTE ADULT - SUBJECTIVE AND OBJECTIVE BOX
INTERVAL HPI/OVERNIGHT EVENTS:  Pt resting comfortably. c/o abd pain and constipation.  On reg diet.   Denies numbness/tingling.    MEDICATIONS  (STANDING):  amoxicillin  500 milliGRAM(s)/clavulanate 1 Tablet(s) Oral every 12 hours  aspirin  chewable 81 milliGRAM(s) Oral daily  atorvastatin 40 milliGRAM(s) Oral at bedtime  DOBUTamine Infusion 2.5 MICROgram(s)/kG/Min (5.87 mL/Hr) IV Continuous <Continuous>  heparin  Infusion.  Unit(s)/Hr (14 mL/Hr) IV Continuous <Continuous>  insulin glargine Injectable (LANTUS) 20 Unit(s) SubCutaneous at bedtime  insulin lispro (ADMELOG) corrective regimen sliding scale   SubCutaneous three times a day before meals  insulin lispro (ADMELOG) corrective regimen sliding scale   SubCutaneous at bedtime  metoprolol succinate ER 25 milliGRAM(s) Oral daily  polyethylene glycol 3350 17 Gram(s) Oral daily  senna 2 Tablet(s) Oral at bedtime    MEDICATIONS  (PRN):  acetaminophen     Tablet .. 1000 milliGRAM(s) Oral every 8 hours PRN Moderate Pain (4 - 6)  heparin   Injectable 6500 Unit(s) IV Push every 6 hours PRN For aPTT less than 40  heparin   Injectable 3000 Unit(s) IV Push every 6 hours PRN For aPTT between 40 - 57  melatonin 3 milliGRAM(s) Oral at bedtime PRN Insomnia  ondansetron Injectable 4 milliGRAM(s) IV Push every 8 hours PRN Nausea and/or Vomiting  oxyCODONE    IR 5 milliGRAM(s) Oral every 6 hours PRN Severe Pain (7 - 10)      Vital Signs Last 24 Hrs  T(C): 36.5 (29 Jan 2025 08:17), Max: 36.9 (29 Jan 2025 05:06)  T(F): 97.7 (29 Jan 2025 08:17), Max: 98.4 (29 Jan 2025 05:06)  HR: 82 (29 Jan 2025 08:17) (73 - 94)  BP: 102/51 (29 Jan 2025 08:17) (102/51 - 122/58)  BP(mean): --  RR: 18 (29 Jan 2025 08:17) (18 - 19)  SpO2: 96% (29 Jan 2025 08:17) (94% - 100%)    Parameters below as of 29 Jan 2025 08:17  Patient On (Oxygen Delivery Method): room air    Physical:  General: A&Ox3. NAD.  LLE: 2+ pitting edema of lower leg. Stable ulcerated toes. Sensation and ROM intact.   RLE: 2+ pitting edema of lower leg. Toes cooler compared to right. Sensation and ROM intact.    I&O's Detail    28 Jan 2025 07:01  -  29 Jan 2025 07:00  --------------------------------------------------------  IN:    Oral Fluid: 200 mL  Total IN: 200 mL    OUT:    Voided (mL): 550 mL  Total OUT: 550 mL    Total NET: -350 mL      29 Jan 2025 07:01  -  29 Jan 2025 09:46  --------------------------------------------------------  IN:    Oral Fluid: 180 mL  Total IN: 180 mL    OUT:  Total OUT: 0 mL    Total NET: 180 mL          LABS:                        9.4    11.48 )-----------( 396      ( 29 Jan 2025 07:22 )             26.8             01-28    135  |  100  |  63[H]  ----------------------------<  115[H]  4.5   |  24  |  2.36[H]    Ca    8.3[L]      28 Jan 2025 07:12  Phos  4.7     01-28  Mg     2.3     01-28     120 done

## 2025-01-29 NOTE — PROGRESS NOTE ADULT - PROBLEM SELECTOR PLAN 3
Hx of DM on home Tresiba 30u, Cequer insulin pump  Endo consulted, Dr. Bhagat  A1c 11.6  Restarted on previous admission insulin regimen on admission: Lantus 30u bedtime+low ISS, Lispro 10u TID+low ISS    -decrease lantus to 16u + ISS ACHS  -Will need OP endo f/u, c/w insulin pump OP 2-3 clicks

## 2025-01-29 NOTE — PROGRESS NOTE ADULT - PROBLEM SELECTOR PLAN 4
Podiatry following, b/l serous bullae, no concerns for infection  Consulted pain management  s/p morphine 2mg x 1 prior to stress test  Found to have RLE coolness  -Right Leg Arterial Duplex: Popliteal artery is occluded with negligible flow of right trifurcation arteries.  -Left leg Arterial Duplex: Slightly tardus parvus waveform of the left popliteal artery is noted, for which underlying disease cannot be excluded. Posterior tibial artery waveform is nonpulsatile. Anterior tibial artery tardus parvus flow is noted.    -On Oxy 5 q6h for pain PRN iso worsening kidney function  -Started on heparin gtt and dobutamine gtt iso Duplex results  -Will transfer to [facility] for CO2 angiogram as per vascular surgery as not candidate for CTA due to worsening SCr  -Keep compression dressing  -LE elevation above heart level at rest

## 2025-01-29 NOTE — PROGRESS NOTE ADULT - PROBLEM SELECTOR PROBLEM 1
SIRS (systemic inflammatory response syndrome)
SIRS (systemic inflammatory response syndrome)
Wound of lower extremity
SIRS (systemic inflammatory response syndrome)

## 2025-01-29 NOTE — PROGRESS NOTE ADULT - ASSESSMENT
64y.o. Female with PAD, CLTI affecting b/l LE, CHF,     -pt ultrasound reviewed  -severe bilateral below knee disease  -awaiting transfer to [facility] for CO2 angiogram  -con't ASA/statin  -Medical optimization

## 2025-01-29 NOTE — H&P ADULT - NSHPPHYSICALEXAM_GEN_ALL_CORE
General:  appears uncomfortable secondary to pain in b/l lower extremities   Neuro: sensation grossly intact in all extremities with obvious hyperalgesia of the right foot, motor grossly intact in b/l LE.   HEENT:  no facial droop; no ptosis or facial edema  Neck:  Supple  Respiratory: normal work of breathing   Abdominal: Soft, NT, ND   Ext: several chronic wounds noted over the b/l lower extremities, worse on R than left     Vasc: B/l LE somewhat cold to the touch beyond the knee, no appreciable doppler signals in b/l LE beyond the above knee pop

## 2025-01-29 NOTE — H&P ADULT - ASSESSMENT
64F presenting as a transfer for Dr. Godinez with Hx of PAD, CHF, and CLTI in b/l LE    P:  - CC diet, NPO at midnight  -AM labs   - Pain control  - Hep Gtt  - DM management  - Plan for CO2 angiogram when able  - Pending AM med clearance    MD IZAIAH Angela/ABRAHAN Surgery Resident PGY1  Vascular Surgery [id number]

## 2025-01-29 NOTE — H&P ADULT - HISTORY OF PRESENT ILLNESS
64F, hx of CHF (last TTE on 1/16/25 EF 30-35%, G2DD), DM, diabetic foot ulcer with a recent admission at [facility] for CHF exacerbation presented as a transfer for worsening R. leg pain and fluid secretion, On non-invasive imaging demonstrating severe depletion of RLE blood flow beyond the popliteal vessel at knee and similar findings in L. Was transferred to [facility] for CO2 angiogram with Dr. Baltazar.

## 2025-01-29 NOTE — PROGRESS NOTE ADULT - SUBJECTIVE AND OBJECTIVE BOX
PGY-1 Progress Note discussed with attending    PAGER #: [[phone]] TILL 5:00 PM  PLEASE CONTACT ON CALL TEAM:  - On Call Team (Please refer to Makenna) FROM 5:00 PM - 8:30PM  - Nightfloat Team FROM 8:30 -7:30 AM      INTERVAL HPI/OVERNIGHT EVENTS: C/o stomach discomfort, decreased appetite. Will provide dulcolex.     Seen in evening and patient said she had 1 BM.    Pending transfer to [facility]    ---------------------------    REVIEW OF SYSTEMS:      MEDICATIONS  (STANDING):  amoxicillin  500 milliGRAM(s)/clavulanate 1 Tablet(s) Oral every 12 hours  aspirin  chewable 81 milliGRAM(s) Oral daily  atorvastatin 40 milliGRAM(s) Oral at bedtime  DOBUTamine Infusion 2.5 MICROgram(s)/kG/Min (5.87 mL/Hr) IV Continuous <Continuous>  heparin  Infusion.  Unit(s)/Hr (14 mL/Hr) IV Continuous <Continuous>  insulin glargine Injectable (LANTUS) 16 Unit(s) SubCutaneous at bedtime  insulin lispro (ADMELOG) corrective regimen sliding scale   SubCutaneous three times a day before meals  insulin lispro (ADMELOG) corrective regimen sliding scale   SubCutaneous at bedtime  metoprolol succinate ER 25 milliGRAM(s) Oral daily  polyethylene glycol 3350 17 Gram(s) Oral daily  senna 2 Tablet(s) Oral at bedtime    MEDICATIONS  (PRN):  acetaminophen     Tablet .. 1000 milliGRAM(s) Oral every 8 hours PRN Moderate Pain (4 - 6)  heparin   Injectable 6500 Unit(s) IV Push every 6 hours PRN For aPTT less than 40  heparin   Injectable 3000 Unit(s) IV Push every 6 hours PRN For aPTT between 40 - 57  melatonin 3 milliGRAM(s) Oral at bedtime PRN Insomnia  ondansetron Injectable 4 milliGRAM(s) IV Push every 8 hours PRN Nausea and/or Vomiting  oxyCODONE    IR 5 milliGRAM(s) Oral every 6 hours PRN Severe Pain (7 - 10)      Vital Signs Last 24 Hrs  T(C): 36.6 (29 Jan 2025 11:16), Max: 36.9 (29 Jan 2025 05:06)  T(F): 97.9 (29 Jan 2025 11:16), Max: 98.4 (29 Jan 2025 05:06)  HR: 81 (29 Jan 2025 11:16) (81 - 94)  BP: 95/68 (29 Jan 2025 11:16) (95/68 - 122/58)  BP(mean): --  RR: 18 (29 Jan 2025 11:16) (18 - 19)  SpO2: 96% (29 Jan 2025 11:16) (94% - 100%)    Parameters below as of 29 Jan 2025 11:16  Patient On (Oxygen Delivery Method): room air        -----------------------------    PHYSICAL EXAMINATION:  GENERAL: NAD, well built  HEAD: Atraumatic, Normocephalic  CHEST/LUNG: B/l crackles heard at mid-lower lungs   HEART: Regular rate and rhythm; No murmurs, rubs, or gallops  ABDOMEN: Soft, Nontender, Nondistended; Bowel sounds present, no pain or masses on palpation  NERVOUS SYSTEM:  Alert & Oriented X3  EXTREMITIES: Wrapped in ACE bandage                          9.4    11.48 )-----------( 396      ( 29 Jan 2025 07:22 )             26.8     01-29    134[L]  |  97  |  69[H]  ----------------------------<  81  4.5   |  22  |  3.60[H]    Ca    8.6      29 Jan 2025 07:22  Phos  5.4     01-29  Mg     2.3     01-29    TPro  7.5  /  Alb  2.4[L]  /  TBili  0.4  /  DBili  x   /  AST  21  /  ALT  31  /  AlkPhos  83  01-29    LIVER FUNCTIONS - ( 29 Jan 2025 07:22 )  Alb: 2.4 g/dL / Pro: 7.5 g/dL / ALK PHOS: 83 U/L / ALT: 31 U/L DA / AST: 21 U/L / GGT: x               PT/INR - ( 27 Jan 2025 22:16 )   PT: 12.9 sec;   INR: 1.11 ratio         PTT - ( 29 Jan 2025 07:22 )  PTT:68.5 sec    I&O's Summary    28 Jan 2025 07:01  -  29 Jan 2025 07:00  --------------------------------------------------------  IN: 200 mL / OUT: 550 mL / NET: -350 mL    29 Jan 2025 07:01  -  29 Jan 2025 15:32  --------------------------------------------------------  IN: 418.8 mL / OUT: 150 mL / NET: 268.8 mL            CAPILLARY BLOOD GLUCOSE      RADIOLOGY & ADDITIONAL TESTS:

## 2025-01-29 NOTE — PROGRESS NOTE ADULT - PROBLEM SELECTOR PROBLEM 4
Diabetes
Wound of lower extremity

## 2025-01-29 NOTE — PROGRESS NOTE ADULT - PROBLEM SELECTOR PLAN 2
Hx of CHF, previous admission in January, on home Lasix 40 qD, Metoprolol Succ 25 qD. Not on Entresto due to insurance issues  Last TTE: LVSF moderately decreased w/ EF 30-35 %. Moderate G2DD. Mild MR  Cardio consulted, Dr. Gilmore rec ischemic eval, pt now agreeable for stress test  Thoroughly discussed plan with pt and brother at bedside  Stress test: small-sized, moderate defect(s) in the apical wall that is predominantly fixed suggestive of an infarction with minimal marcus-infarct ischemia    -Admit to tele + q4 VS  -Strict I/Os  -Fluid Restrict 1.5 L  -Daily Weights  -hold Bumex & Entresto iso worsening kidney fx  -c/w Metoprolol Succ 25 qD  -c/w Dobutamine

## 2025-01-29 NOTE — DISCHARGE NOTE NURSING/CASE MANAGEMENT/SOCIAL WORK - PATIENT PORTAL LINK FT
You can access the FollowMyHealth Patient Portal offered by [facility] by registering at the following website: [email or web address]. By joining [employer]’s FollowMyHealth portal, you will also be able to view your health information using other applications (apps) compatible with our system.

## 2025-01-29 NOTE — PROGRESS NOTE ADULT - PROBLEM SELECTOR PLAN 1
Had fever 101.5 on admission  patient denying any recent history of fever  UA negative  COVID/viral panel negative  Abdomen without pain, soft nontender  discussed with podiatry lower extremity and reviewed photos- lower extremity appears improved compared to prior  Xray with bilateral effusions- check CT chest  CT chest: Small bilateral pleural effusions and small pericardial effusion.    PO Augmentin 500 Q12 starting 1/29

## 2025-01-29 NOTE — PROGRESS NOTE ADULT - REASON FOR ADMISSION
R. Leg pain and swelling
R. Leg pain and swelling, new HFrEF
R. Leg pain and swelling

## 2025-01-29 NOTE — PROGRESS NOTE ADULT - PROVIDER SPECIALTY LIST ADULT
Cardiology
Cardiology
Podiatry
Podiatry
Vascular Surgery
Vascular Surgery
Cardiology
Infectious Disease
Infectious Disease
Internal Medicine
Nephrology
Nephrology
Vascular Surgery
Internal Medicine
Internal Medicine
Cardiology
Cardiology
Pain Medicine
Internal Medicine

## 2025-01-30 LAB
ANION GAP SERPL CALC-SCNC: 18 MMOL/L — HIGH (ref 5–17)
APTT BLD: 57.8 SEC — HIGH (ref 24.5–35.6)
APTT BLD: >200 SEC — CRITICAL HIGH (ref 24.5–35.6)
BLD GP AB SCN SERPL QL: NEGATIVE — SIGNIFICANT CHANGE UP
BUN SERPL-MCNC: 72 MG/DL — HIGH (ref 7–23)
CALCIUM SERPL-MCNC: 8.8 MG/DL — SIGNIFICANT CHANGE UP (ref 8.4–10.5)
CHLORIDE SERPL-SCNC: 90 MMOL/L — LOW (ref 96–108)
CO2 SERPL-SCNC: 22 MMOL/L — SIGNIFICANT CHANGE UP (ref 22–31)
CREAT ?TM UR-MCNC: 33 MG/DL — SIGNIFICANT CHANGE UP
CREAT SERPL-MCNC: 4.27 MG/DL — HIGH (ref 0.5–1.3)
EGFR: 11 ML/MIN/1.73M2 — LOW
EGFR: 11 ML/MIN/1.73M2 — LOW
GLUCOSE BLDC GLUCOMTR-MCNC: 107 MG/DL — HIGH (ref 70–99)
GLUCOSE BLDC GLUCOMTR-MCNC: 112 MG/DL — HIGH (ref 70–99)
GLUCOSE BLDC GLUCOMTR-MCNC: 130 MG/DL — HIGH (ref 70–99)
GLUCOSE BLDC GLUCOMTR-MCNC: 168 MG/DL — HIGH (ref 70–99)
GLUCOSE BLDC GLUCOMTR-MCNC: 94 MG/DL — SIGNIFICANT CHANGE UP (ref 70–99)
GLUCOSE BLDC GLUCOMTR-MCNC: 96 MG/DL — SIGNIFICANT CHANGE UP (ref 70–99)
GLUCOSE SERPL-MCNC: 196 MG/DL — HIGH (ref 70–99)
HCT VFR BLD CALC: 27.3 % — LOW (ref 34.5–45)
HGB BLD-MCNC: 9.5 G/DL — LOW (ref 11.5–15.5)
MAGNESIUM SERPL-MCNC: 2.2 MG/DL — SIGNIFICANT CHANGE UP (ref 1.6–2.6)
MCHC RBC-ENTMCNC: 25.1 PG — LOW (ref 27–34)
MCHC RBC-ENTMCNC: 34.8 G/DL — SIGNIFICANT CHANGE UP (ref 32–36)
MCV RBC AUTO: 72 FL — LOW (ref 80–100)
NRBC # BLD: 0 /100 WBCS — SIGNIFICANT CHANGE UP (ref 0–0)
NRBC BLD-RTO: 0 /100 WBCS — SIGNIFICANT CHANGE UP (ref 0–0)
PHOSPHATE SERPL-MCNC: 6.1 MG/DL — HIGH (ref 2.5–4.5)
PLATELET # BLD AUTO: 400 K/UL — SIGNIFICANT CHANGE UP (ref 150–400)
POTASSIUM SERPL-MCNC: 5 MMOL/L — SIGNIFICANT CHANGE UP (ref 3.5–5.3)
POTASSIUM SERPL-SCNC: 5 MMOL/L — SIGNIFICANT CHANGE UP (ref 3.5–5.3)
PROT ?TM UR-MCNC: 35 MG/DL — HIGH (ref 0–12)
PROT/CREAT UR-RTO: 1.1 RATIO — HIGH (ref 0–0.2)
RBC # BLD: 3.79 M/UL — LOW (ref 3.8–5.2)
RBC # FLD: 15.4 % — HIGH (ref 10.3–14.5)
RH IG SCN BLD-IMP: NEGATIVE — SIGNIFICANT CHANGE UP
SODIUM SERPL-SCNC: 130 MMOL/L — LOW (ref 135–145)
UUN UR-MCNC: 361 MG/DL — SIGNIFICANT CHANGE UP
WBC # BLD: 11.1 K/UL — HIGH (ref 3.8–10.5)
WBC # FLD AUTO: 11.1 K/UL — HIGH (ref 3.8–10.5)

## 2025-01-30 PROCEDURE — 76770 US EXAM ABDO BACK WALL COMP: CPT | Mod: 26

## 2025-01-30 PROCEDURE — 99232 SBSQ HOSP IP/OBS MODERATE 35: CPT

## 2025-01-30 PROCEDURE — 99222 1ST HOSP IP/OBS MODERATE 55: CPT

## 2025-01-30 PROCEDURE — 70450 CT HEAD/BRAIN W/O DYE: CPT | Mod: 26

## 2025-01-30 RX ORDER — ALBUMIN (HUMAN) 12.5 G/50ML
50 INJECTION, SOLUTION INTRAVENOUS EVERY 6 HOURS
Refills: 0 | Status: COMPLETED | OUTPATIENT
Start: 2025-01-30 | End: 2025-02-01

## 2025-01-30 RX ADMIN — ALBUMIN (HUMAN) 50 MILLILITER(S): 12.5 INJECTION, SOLUTION INTRAVENOUS at 11:36

## 2025-01-30 RX ADMIN — HEPARIN SODIUM 14 UNIT(S)/HR: 1000 INJECTION INTRAVENOUS; SUBCUTANEOUS at 10:22

## 2025-01-30 RX ADMIN — Medication 650 MILLIGRAM(S): at 16:25

## 2025-01-30 RX ADMIN — METOPROLOL SUCCINATE 25 MILLIGRAM(S): 50 TABLET, EXTENDED RELEASE ORAL at 06:10

## 2025-01-30 RX ADMIN — Medication 650 MILLIGRAM(S): at 22:00

## 2025-01-30 RX ADMIN — ALBUMIN (HUMAN) 50 MILLILITER(S): 12.5 INJECTION, SOLUTION INTRAVENOUS at 23:47

## 2025-01-30 RX ADMIN — AMOXICILLIN AND CLAVULANATE POTASSIUM 1 TABLET(S): 500; 125 TABLET, FILM COATED ORAL at 06:05

## 2025-01-30 RX ADMIN — ALBUMIN (HUMAN) 50 MILLILITER(S): 12.5 INJECTION, SOLUTION INTRAVENOUS at 17:21

## 2025-01-30 RX ADMIN — ATORVASTATIN CALCIUM 40 MILLIGRAM(S): 80 TABLET, FILM COATED ORAL at 21:30

## 2025-01-30 RX ADMIN — INSULIN LISPRO 1: 100 INJECTION, SOLUTION INTRAVENOUS; SUBCUTANEOUS at 06:58

## 2025-01-30 RX ADMIN — Medication 650 MILLIGRAM(S): at 21:29

## 2025-01-30 RX ADMIN — HEPARIN SODIUM 14 UNIT(S)/HR: 1000 INJECTION INTRAVENOUS; SUBCUTANEOUS at 07:44

## 2025-01-30 RX ADMIN — Medication 650 MILLIGRAM(S): at 15:25

## 2025-01-30 RX ADMIN — INSULIN GLARGINE-YFGN 16 UNIT(S): 100 INJECTION, SOLUTION SUBCUTANEOUS at 21:31

## 2025-01-30 RX ADMIN — HEPARIN SODIUM 14 UNIT(S)/HR: 1000 INJECTION INTRAVENOUS; SUBCUTANEOUS at 19:31

## 2025-01-30 RX ADMIN — AMOXICILLIN AND CLAVULANATE POTASSIUM 1 TABLET(S): 500; 125 TABLET, FILM COATED ORAL at 17:19

## 2025-01-30 NOTE — CONSULT NOTE ADULT - SUBJECTIVE AND OBJECTIVE BOX
HPI:  Patient is a 64 year old Female, with a PMHx of of CHF (last TTE on 01/16/25 with EF of 30-35%, G2DD), DM, diabetic foot ulcer with a recent admission at [facility] for CHF exacerbation who presents to [facility] as a transfer for worsening righ leg pain and fluid secretion. As per documentation, patient was reported to have severe depletion of RLE blood flow beyond the popliteal vessel at knee and similar findings in the left. Patient was transferred to [facility] for CO2 angiogram with Dr. Baltazar. Of note, patient with reported visual disturbance for which patient was seen and evaluated by opthalmology Internal Medicine has been consulted on Ms. Kirby's care for medical management.          REVIEW OF SYSTEMS:    CONSTITUTIONAL: No weakness, fevers or chills  EYES/ENT: No visual changes;  No vertigo or throat pain   NECK: No pain or stiffness  RESPIRATORY: No cough, wheezing, hemoptysis; No shortness of breath  CARDIOVASCULAR: No chest pain or palpitations  GASTROINTESTINAL: No abdominal or epigastric pain. No nausea, vomiting, or hematemesis; No diarrhea or constipation. No melena or hematochezia.  GENITOURINARY: No dysuria, frequency or hematuria  NEUROLOGICAL: No numbness or weakness  SKIN: No itching, burning, rashes, or lesions   All other review of systems is negative unless indicated above.    MEDICATIONS:  MEDICATIONS  (STANDING):  acetaminophen     Tablet .. 650 milliGRAM(s) Oral every 8 hours  amoxicillin  500 milliGRAM(s)/clavulanate 1 Tablet(s) Oral every 12 hours  atorvastatin 40 milliGRAM(s) Oral at bedtime  dextrose 5%. 1000 milliLiter(s) (50 mL/Hr) IV Continuous <Continuous>  dextrose 5%. 1000 milliLiter(s) (100 mL/Hr) IV Continuous <Continuous>  dextrose 50% Injectable 25 Gram(s) IV Push once  dextrose 50% Injectable 12.5 Gram(s) IV Push once  dextrose 50% Injectable 25 Gram(s) IV Push once  DOBUTamine Infusion 2.5 MICROgram(s)/kG/Min (5.41 mL/Hr) IV Continuous <Continuous>  glucagon  Injectable 1 milliGRAM(s) IntraMuscular once  heparin  Infusion 14 Unit(s)/Hr (14 mL/Hr) IV Continuous <Continuous>  insulin glargine Injectable (LANTUS) 16 Unit(s) SubCutaneous at bedtime  insulin lispro (ADMELOG) corrective regimen sliding scale   SubCutaneous three times a day before meals  lactated ringers. 1000 milliLiter(s) (40 mL/Hr) IV Continuous <Continuous>  metoprolol succinate ER 25 milliGRAM(s) Oral daily  polyethylene glycol 3350 17 Gram(s) Oral daily  senna 2 Tablet(s) Oral at bedtime      PHYSICAL EXAM:  T(C): 36.3 (01-30-25 @ 05:46), Max: 36.6 (01-29-25 @ 11:16)  HR: 86 (01-30-25 @ 05:46) (78 - 96)  BP: 118/67 (01-30-25 @ 05:46) (95/68 - 124/52)  RR: 18 (01-30-25 @ 05:46) (18 - 19)  SpO2: 95% (01-30-25 @ 05:46) (95% - 98%)  Wt(kg): --  I&O's Summary    29 Jan 2025 07:01  -  30 Jan 2025 07:00  --------------------------------------------------------  IN: 356.4 mL / OUT: 0 mL / NET: 356.4 mL      Height (cm): 162.6 (01-29 @ 21:11)  Weight (kg): 72.121 (01-29 @ 21:11)  BMI (kg/m2): 27.3 (01-29 @ 21:11)  BSA (m2): 1.77 (01-29 @ 21:11)    Appearance: Normal	  HEENT:  PERRLA   Lymphatic: No lymphadenopathy   Cardiovascular: Normal S1 S2, no JVD  Respiratory: normal effort , clear  Gastrointestinal:  Soft, Non-tender  Skin: No rashes,  warm to touch  Psychiatry:  Mood & affect appropriate  Musculuskeletal: No edema          01-29-25 @ 07:01  -  01-30-25 @ 07:00  --------------------------------------------------------  IN: 356.4 mL / OUT: 0 mL / NET: 356.4 mL                                  9.5    11.10 )-----------( 400      ( 30 Jan 2025 07:03 )             27.3               01-30    130[L]  |  90[L]  |  72[H]  ----------------------------<  196[H]  5.0   |  22  |  4.27[H]    Ca    8.8      30 Jan 2025 07:00  Phos  6.1     01-30  Mg     2.2     01-30    TPro  7.5  /  Alb  2.4[L]  /  TBili  0.4  /  DBili  x   /  AST  21  /  ALT  31  /  AlkPhos  83  01-29    PTT - ( 29 Jan 2025 07:22 )  PTT:68.5 sec                   Urinalysis Basic - ( 30 Jan 2025 07:00 )    Color: x / Appearance: x / SG: x / pH: x  Gluc: 196 mg/dL / Ketone: x  / Bili: x / Urobili: x   Blood: x / Protein: x / Nitrite: x   Leuk Esterase: x / RBC: x / WBC x   Sq Epi: x / Non Sq Epi: x / Bacteria: x      Culture - Urine (01.23.25 @ 14:12)   Specimen Source: Clean Catch  Culture Results:   >=3 organisms. Probable collection contamination.    Culture - Blood (01.23.25 @ 10:36)   Specimen Source: .Blood BLOOD  Culture Results:   No growth at 5 days    Culture - Blood (01.23.25 @ 10:30)   Specimen Source: .Blood BLOOD  Culture Results:   No growth at 5 days       HPI:  Patient is a 64 year old Female, with a PMHx of of CHF (last TTE on 01/16/25 with EF of 30-35%, G2DD), DM, diabetic foot ulcer [facility] who presents to [facility] as a transfer for worsening right leg pain and fluid secretion. As per documentation, patient was reported to have severe depletion of RLE blood flow beyond the popliteal vessel at knee and similar findings in the left. Patient was transferred to [facility] for CO2 angiogram with Dr. Baltazar. Of note, patient with reported visual disturbance for which patient was seen and evaluated by opthalmology Internal Medicine has been consulted on Ms. Kirby's care for medical management.          REVIEW OF SYSTEMS:    CONSTITUTIONAL: + Generalized weakness   EYES/ENT: + Visual changes;  No vertigo or throat pain   NECK: No pain or stiffness  RESPIRATORY: No cough, wheezing, hemoptysis; No shortness of breath  CARDIOVASCULAR: No chest pain or palpitations  GASTROINTESTINAL: No abdominal or epigastric pain. No nausea, vomiting, or hematemesis; No diarrhea or constipation. No melena or hematochezia.  GENITOURINARY: No dysuria, frequency or hematuria  NEUROLOGICAL: No numbness or weakness  SKIN/ MSK: + Diabetic foot ulcer; + Leg pain B/L    All other review of systems is negative unless indicated above.    MEDICATIONS:  MEDICATIONS  (STANDING):  acetaminophen     Tablet .. 650 milliGRAM(s) Oral every 8 hours  amoxicillin  500 milliGRAM(s)/clavulanate 1 Tablet(s) Oral every 12 hours  atorvastatin 40 milliGRAM(s) Oral at bedtime  dextrose 5%. 1000 milliLiter(s) (50 mL/Hr) IV Continuous <Continuous>  dextrose 5%. 1000 milliLiter(s) (100 mL/Hr) IV Continuous <Continuous>  dextrose 50% Injectable 25 Gram(s) IV Push once  dextrose 50% Injectable 12.5 Gram(s) IV Push once  dextrose 50% Injectable 25 Gram(s) IV Push once  DOBUTamine Infusion 2.5 MICROgram(s)/kG/Min (5.41 mL/Hr) IV Continuous <Continuous>  glucagon  Injectable 1 milliGRAM(s) IntraMuscular once  heparin  Infusion 14 Unit(s)/Hr (14 mL/Hr) IV Continuous <Continuous>  insulin glargine Injectable (LANTUS) 16 Unit(s) SubCutaneous at bedtime  insulin lispro (ADMELOG) corrective regimen sliding scale   SubCutaneous three times a day before meals  lactated ringers. 1000 milliLiter(s) (40 mL/Hr) IV Continuous <Continuous>  metoprolol succinate ER 25 milliGRAM(s) Oral daily  polyethylene glycol 3350 17 Gram(s) Oral daily  senna 2 Tablet(s) Oral at bedtime      PHYSICAL EXAM:  T(C): 36.3 (01-30-25 @ 05:46), Max: 36.6 (01-29-25 @ 11:16)  HR: 86 (01-30-25 @ 05:46) (78 - 96)  BP: 118/67 (01-30-25 @ 05:46) (95/68 - 124/52)  RR: 18 (01-30-25 @ 05:46) (18 - 19)  SpO2: 95% (01-30-25 @ 05:46) (95% - 98%)  Wt(kg): --  I&O's Summary    29 Jan 2025 07:01  -  30 Jan 2025 07:00  --------------------------------------------------------  IN: 356.4 mL / OUT: 0 mL / NET: 356.4 mL      Height (cm): 162.6 (01-29 @ 21:11)  Weight (kg): 72.121 (01-29 @ 21:11)  BMI (kg/m2): 27.3 (01-29 @ 21:11)  BSA (m2): 1.77 (01-29 @ 21:11)    Appearance: Awake, generalized weakness, sitting OOB TC 	  HEENT: Eyes are open  Lymphatic: No lymphadenopathy grossly    Cardiovascular: Normal    Respiratory: normal effort on RA, clear  Gastrointestinal:  Soft, Non-tender  Skin: No rashes,  warm to touch  Psychiatry:  Mood & affect appropriate  Musculoskeletal: + B/L LE ankles and feet wrapped in ACE wrap; + Ulcer; + Edematous             01-29-25 @ 07:01  -  01-30-25 @ 07:00  --------------------------------------------------------  IN: 356.4 mL / OUT: 0 mL / NET: 356.4 mL                                  9.5    11.10 )-----------( 400      ( 30 Jan 2025 07:03 )             27.3               01-30    130[L]  |  90[L]  |  72[H]  ----------------------------<  196[H]  5.0   |  22  |  4.27[H]    Ca    8.8      30 Jan 2025 07:00  Phos  6.1     01-30  Mg     2.2     01-30    TPro  7.5  /  Alb  2.4[L]  /  TBili  0.4  /  DBili  x   /  AST  21  /  ALT  31  /  AlkPhos  83  01-29    PTT - ( 29 Jan 2025 07:22 )  PTT:68.5 sec                   Urinalysis Basic - ( 30 Jan 2025 07:00 )    Color: x / Appearance: x / SG: x / pH: x  Gluc: 196 mg/dL / Ketone: x  / Bili: x / Urobili: x   Blood: x / Protein: x / Nitrite: x   Leuk Esterase: x / RBC: x / WBC x   Sq Epi: x / Non Sq Epi: x / Bacteria: x      Culture - Urine (01.23.25 @ 14:12)   Specimen Source: Clean Catch  Culture Results: >=3 organisms. Probable collection contamination.    Culture - Blood (01.23.25 @ 10:36)   Specimen Source: .Blood BLOOD  Culture Results: No growth at 5 days    Culture - Blood (01.23.25 @ 10:30)   Specimen Source: .Blood BLOOD  Culture Results: No growth at 5 days    < from: TTE W or WO Ultrasound Enhancing Agent (01.16.25 @ 12:45) >  CONCLUSIONS:      1. Left ventricular systolic function is moderately decreased with an ejection fraction visually estimated at 30 to 35 %.   2. There is moderate (grade 2) left ventricular diastolic dysfunction.   3. Mild mitral regurgitation.   4. Trace tricuspid regurgitation.   5. Trace pulmonic regurgitation.   6. Trace pericardial effusion.   7. Right pleural effusion noted.   8. No prior echocardiogram is available for comparison.    < end of copied text >      < from: Nuclear Stress Test-Pharmacologic.. (01.24.25 @ 10:31) >  Conclusions:   1. Myocardial Perfusion: Abnormal.   2. Qualitative Perfusion:      - small-sized, moderate defect(s) in the apical wall that is predominantly fixed suggestive of an infarction with minimal marcus-infarct ischemia.   3. The post stress left ventricular EF is 25 %. The stress end diastolic volume is 118 ml.   4. Results D/Wcardiologist Dr. Gilmore at 18:31    < end of copied text >        < from: CT Chest No Cont (01.25.25 @ 14:08) >  IMPRESSION: Small bilateral pleural effusions and small pericardial   effusion.    < end of copied text >

## 2025-01-30 NOTE — CONSULT NOTE ADULT - ASSESSMENT
64F, hx of CHF (last TTE on 1/16/25 EF 30-35%, G2DD), DM, diabetic foot ulcer with a recent admission at [facility] for CHF exacerbation 1/14-1/17 p/w worsening R. leg pain and fluid secretion, was meeting sepsis criteria with podiatry having low suspicion for right cellulitis and admitted for continued management of HF exacerbation and needing ischemic eval.     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  -Left leg Arterial Duplex: Slightly tardus parvus waveform of the left popliteal artery is noted, for which underlying disease cannot be excluded. Posterior tibial artery waveform is nonpulsatile. Anterior tibial artery tardus parvus flow is noted.   Transferred to [facility] for CO2 angiogram as per vascular surgery    #  PAD Wound of lower extremity.   ·  Plan: Podiatry following, b/l serous bullae, no concerns for infection  Found to have RLE coolness  -Right Leg Arterial Duplex: Popliteal artery is occluded with negligible flow of right trifurcation arteries.  -Left leg Arterial Duplex: Slightly tardus parvus waveform of the left popliteal artery is noted, for which underlying disease cannot be excluded. Posterior tibial artery waveform is nonpulsatile. Anterior tibial artery tardus parvus flow is noted.  -Started on heparin gtt and dobutamine gtt -Will transfer to [facility] for CO2 angiogram as per vascular surgery as not candidate for CTA due to worsening SCr  -Keep compression dressing  -LE elevation above heart level at rest.  -Transferred to [facility] for CO2 angiogram   - Overall this patient is at   intermediate  risk (for cardiac death, nonfatal myocardial infarction, and nonfatal cardiac arrest perioperatively for this intermediate  risk procedure).   No cardiac contraindications for CO2 vangiogram  There  are  no further recommendation for risk stratifying imaging/stress testing prior to planned surgery      # HFrEF (congestive heart failure).   ·  Plan: Hx of HF, previous admission in January, on home Lasix 40 qD, Metoprolol Succ 25 qD. Not on Entresto due to insurance issues  Last TTE: LVSF moderately decreased w/ EF 30-35 %. Moderate G2DD. Mild MR  Stress test: small-sized, moderate defect(s) in the apical wall that is predominantly fixed suggestive of an infarction with minimal marcus-infarct ischemia  Ischemic cardiomyopathy  GDMT on hold 2/2 JAMEL  Continue Dobutamine at present    # JAMEL  Creatinine Trend: 3.60<--, 2.36<--, 1.55<--, 1.25<--, 1.25<--, 1.17<--  Cardiorenal   Renal consult Dr Constantine Villaseñor called

## 2025-01-30 NOTE — PROVIDER CONTACT NOTE (OTHER) - ACTION/TREATMENT ORDERED:
No interventions at this time No interventions at this time, Ophthalmology resident called to bedside to examine patient

## 2025-01-30 NOTE — PROGRESS NOTE ADULT - ASSESSMENT
64y.o. Female with PAD, CLTI affecting b/l LE, CHF, -awaiting transfer to [facility] for CO2 angiogram    -pt ultrasound reviewed  -severe bilateral below knee disease  -con't ASA/statin  -Medical optimization    vascular [id number]

## 2025-01-30 NOTE — CONSULT NOTE ADULT - ASSESSMENT
Patient is a 64 year old Female, with a PMHx of of CHF (last TTE on 01/16/25 with EF of 30-35%, G2DD), DM, diabetic foot ulcer with a recent admission at [facility] for CHF exacerbation who presents to [facility] as a transfer for worsening right leg pain and fluid secretion. As per documentation, patient was reported to have severe depletion of RLE blood flow beyond the popliteal vessel at knee and similar findings in the left. Patient was transferred to [facility] for CO2 angiogram with Dr. Baltazar. Of note, patient with reported visual disturbance for which patient was seen and evaluated by opthalmology Internal Medicine has been consulted on Ms. Kirby's care for medical management.       CHF - HFrEF  - 1/16/2025 TTE with EF of 30-35%, G2DD  - NST with   - On Toprol XL 25 PO Qd  - On Dobutamine gtt    - Monitor I and O; Maintain O > I; Check daily weights   - Monitor volume status; Monitor electrolytes   - Monitor on tele   - Cardio eval appreciated; F/u recs    Diabetic Foot Ulcer   - US w/ severe depletion of RLE blood flow beyond popliteal vessel at knee and similar findings in left  - On Lipitor  - On Hep gtt; Monitor Ptt and adjust as per normogram; Monitor H/H and for gross bleeding  - Transferred to [facility] for CO2 angiogram with Vascular   - Vascular care appreciated; F/u recs    Leukocytosis   - Likely 2/2 LE   - 1/23 BCx2 negative; UCx w/ probable contamination   - On Amoxicillin from OSH   - Trend CBC, temp curve, VS and adjust as tolerated     Visual Disturbance  - Opthalmology eval appreciated; F/u recs    JAMEL, Hyponatremia, Metabolic Acidosis   - As per OSH documentation, JAMEL was thought to be 2/2 to Unasyn/ Bumex / Entresto use and hypotension   - Check Bladder scan   - Check Renal US   - Cr up-trending; Monitor for LILIAN   - Avoid nephrotoxic agents  - Monitor Cr and daily BMP; Avoid overcorrection of Na > 6-8 mEq in 24 hours   - Renal eval recommended     Anemia   - Check anemia labs - Iron, B12, Folate, Ferritin   - On Hep gtt as above; Monitor   - Transfuse for Hgb < 7.5  - Maintain active T/S; Monitor H/H; Large bore IV access    Diabetes Mellitus   - A1C 11.6   - C/w Sliding scale, Lantus (If NPO, reduce Lantus)   - Diabetic, DASH Diet  - Monitor glucose levels; Adjust insulin regimen as tolerated   - ENDO eval recommended   - Patient will require outpatient Endo, Renal, Optho and Podiatry follow ups upon DC    HLD  - Check Lipid panel   - On Lipitor 40     HTN   - On Toprol XL as above   - On Dobutamine gtt at present   - Monitor BP, VS and adjust as tolerated; Reported to be hypotensive at OSH as per documentation     PPX      INCOMPLETE NOTE  Patient is a 64 year old Female, with a PMHx of of CHF (last TTE on 01/16/25 with EF of 30-35%, G2DD), DM, diabetic foot ulcer with a recent admission at [facility] for CHF exacerbation who presents to [facility] as a transfer for worsening right leg pain and fluid secretion. As per documentation, patient was reported to have severe depletion of RLE blood flow beyond the popliteal vessel at knee and similar findings in the left. Patient was transferred to [facility] for CO2 angiogram with Dr. Baltazar. Of note, patient with reported visual disturbance for which patient was seen and evaluated by opthalmology Internal Medicine has been consulted on Ms. Kirby's care for medical management.       CHF - HFrEF  - 1/16/2025 TTE with REDUCED EF of 30-35%, Grade II LVDD, Mild MR, Trace TR/ NV, Trace pericardial effusion  - NST - Abnormal, w/ small-sized, moderate defect(s) in apical wall that is predominantly fixed suggestive of an infarction with minimal marcus-infarct ischemia, post stress LV EF 25 %  - CT Chest w/ Small B/L pleural effusions and small pericardial effusion.  - GDMT as per Cardio - currently on Toprol XL 25 PO Qd (In view of JAMEL further medications currently held)   - On Dobutamine gtt    - Monitor I and O; Maintain O > I; Check daily weights   - Monitor volume status; Monitor electrolytes   - Monitor on tele   - Cardio eval appreciated; F/u recs    Diabetic Foot Ulcer   - RLE Arterial Duplex w/ Popliteal artery occlusion   - LLE Arterial Duplex w/ underlying disease cannot be excluded   - On Lipitor  - On Hep gtt; Monitor Ptt and adjust as per normogram; Monitor H/H and for gross bleeding  - Trend inflammatory markers   - Transferred to [facility] for CO2 angiogram with Vascular - Needs RENAL Clearance prior. Patient is deemed to be a high risk candidate. Cardiac clearance as per Cardiology.  - Vascular care appreciated; F/u recs    Leukocytosis   - Likely 2/2 LE   - 1/23 BCx2 negative; UCx w/ probable contamination   - On Amoxicillin from OSH   - Trend CBC, temp curve, VS and adjust as tolerated     JAMEL, Hyponatremia, Metabolic Acidosis   - As per OSH documentation, JAMEL was thought to be 2/2 to Unasyn/ Bumex / Entresto use and hypotension   - Check Bladder scan   - Check Renal US   - Cr up-trending; Monitor for LILIAN   - Avoid nephrotoxic agents  - Monitor Cr and daily BMP; Avoid overcorrection of Na > 6-8 mEq in 24 hours   - Renal eval appreciated; F/u recs     Visual Disturbance  - Consider CT Head   - Opthalmology eval appreciated; F/u recs    LE Edema  - W/ notable LE Edema on exam  - Diuresis on hold as per renal in view of Cr  - LE elevation; Compression  - Monitor     Pericardial effusion   - TTE w/ trace pericardial effusion   - CT Chest w/ small pericardial effusion   - Denies chest pain, palpitations, chest tightness or discomfort, shortness of breath or dyspnea   - Repeat TTE in 1 month for further evaluation   - Monitor on tele  - Cardio follow up     Pleural effusion   - CT Chest w/ small B/L pleural effusions  - On RA; No respiratory complaints at present  - Monitor O2 saturation; Supplement to maintain > 90%   - Diuresis on hold in view of Cr    Anemia   - Check anemia labs - Iron, B12, Folate, Ferritin   - On Hep gtt as above; Monitor   - Transfuse for Hgb < 7.5  - Maintain active T/S; Monitor H/H; Large bore IV access    Diabetes Mellitus   - A1C 11.6   - C/w Sliding scale, Lantus (If NPO, reduce Lantus)   - Diabetic, DASH Diet  - Monitor glucose levels; Adjust insulin regimen as tolerated   - ENDO eval recommended   - Patient will require outpatient Endo, Renal, Optho and Podiatry follow ups upon DC    HLD  - Check Lipid panel   - On Lipitor 40     HTN   - On Toprol XL as above   - On Dobutamine gtt at present   - Monitor BP, VS and adjust as tolerated; Reported to be hypotensive at OSH as per documentation     PPX    Discussed with Attending.

## 2025-01-30 NOTE — CONSULT NOTE ADULT - ASSESSMENT
64y Female with history of CHF presents as a transfer for LE CO2 angiogram. Nephrology consulted for elevated Scr.    1) JAMEL: likely due to ATN in setting of infection/hypotension. Scr increasing. UA relatively bland. FeUrea low consistent with low flow state. Check renal US. Continue with  gtt if ok with cardiology. Change IVF to IV albumin. Avoid nephrotoxins.    2) HTN: BP low normal. Metoprolol as per cardiology. Holding ARNI.    3) LE edema: Holding diuretics given JAMEL and lack of respiratory symptoms. IV albumin as above. Check spot urine TP/CR r/o nephrotic range proteinuria given hypoalbuminemia. TTE with moderately decreased LVSF. Monitor UO.    4) Hyperphosphatemia: Continue with renal diet.      [facility] NEPHROLOGY  Raji Waterman M.D.  Mars Feliz D.O.  Kelley Parks M.D.  MD Nancy Kulkarni, MSN, ANP-C    Telephone: [phone]  Facsimile: [phone] [address] [place] [address], [address]  [place] [phone]

## 2025-01-30 NOTE — CONSULT NOTE ADULT - SUBJECTIVE AND OBJECTIVE BOX
[facility] DEPARTMENT OF OPHTHALMOLOGY - INITIAL ADULT CONSULT  -----------------------------------------------------------------------------------------------------------------  Zackery Hernandez MD  PGY 2  Contact on Teams  -----------------------------------------------------------------------------------------------------------------    HPI:  64F, hx of CHF (last TTE on 1/16/25 EF 30-35%, G2DD), DM, diabetic foot ulcer with a recent admission at [facility] for CHF exacerbation presented as a transfer for worsening R. leg pain and fluid secretion, On non-invasive imaging demonstrating severe depletion of RLE blood flow beyond the popliteal vessel at knee and similar findings in L. Was transferred to [facility] for CO2 angiogram with Dr. Baltazar. (29 Jan 2025 20:05)    Interval History: 64F POH CE/PCIOL OU, Non-clearing VH s/p PPV, PDR s/p PRP OU, s/p LAVELL OU (last injection 3 years ago; Follows with Dr. Laguna) PMH CHF, T2D admitted for vascular surgery management of leg pain and secretion, now complaining of 2-3 day history of blurriness and darkening of vision OU. States graying of vision has been getting worse while inpatient. Denies flashes, floaters, blackout vision, fieled cuts, eye pain, redness, irritation, FBS, tearing, double vision, or pain with EOMs. Ophtho consulted for further workup of vision disturbance    PAST MEDICAL & SURGICAL HISTORY:  DM (diabetes mellitus)      Diabetic foot ulcer      Congestive heart failure (CHF)      CAD (coronary artery disease)      Cataract of both eyes, unspecified cataract type      History of cholecystectomy        Past Ocular History: CE/PCIOL OU, Non-clearing VH s/p PPV, PDR s/p PRP OU, s/p LAVELL OU (last injection 3 years ago; Follows with Dr. Laguna)   Ophthalmic Medications: None  FAMILY HISTORY:  Family history of CHF (congestive heart failure) (Mother)      MEDICATIONS  (STANDING):  acetaminophen     Tablet .. 650 milliGRAM(s) Oral every 8 hours  amoxicillin  500 milliGRAM(s)/clavulanate 1 Tablet(s) Oral every 12 hours  atorvastatin 40 milliGRAM(s) Oral at bedtime  dextrose 5%. 1000 milliLiter(s) (50 mL/Hr) IV Continuous <Continuous>  dextrose 5%. 1000 milliLiter(s) (100 mL/Hr) IV Continuous <Continuous>  dextrose 50% Injectable 25 Gram(s) IV Push once  dextrose 50% Injectable 12.5 Gram(s) IV Push once  dextrose 50% Injectable 25 Gram(s) IV Push once  DOBUTamine Infusion 2.5 MICROgram(s)/kG/Min (5.41 mL/Hr) IV Continuous <Continuous>  glucagon  Injectable 1 milliGRAM(s) IntraMuscular once  heparin  Infusion 14 Unit(s)/Hr (14 mL/Hr) IV Continuous <Continuous>  insulin glargine Injectable (LANTUS) 16 Unit(s) SubCutaneous at bedtime  insulin lispro (ADMELOG) corrective regimen sliding scale   SubCutaneous three times a day before meals  lactated ringers. 1000 milliLiter(s) (40 mL/Hr) IV Continuous <Continuous>  metoprolol succinate ER 25 milliGRAM(s) Oral daily  polyethylene glycol 3350 17 Gram(s) Oral daily  senna 2 Tablet(s) Oral at bedtime    MEDICATIONS  (PRN):  dextrose Oral Gel 15 Gram(s) Oral once PRN Blood Glucose LESS THAN 70 milliGRAM(s)/deciliter  melatonin 3 milliGRAM(s) Oral at bedtime PRN Insomnia  ondansetron Injectable 4 milliGRAM(s) IV Push every 6 hours PRN Nausea and/or Vomiting  oxyCODONE    IR 5 milliGRAM(s) Oral every 6 hours PRN Severe Pain (7 - 10)    Allergies & Intolerances:   No Known Allergies    Review of Systems:  Constitutional: No fever, chills  Eyes: as above  Neuro: No tremors  Cardiovascular: No chest pain, palpitations  Respiratory: No SOB, no cough  GI: No nausea, vomiting, abdominal pain  : No dysuria  Skin: no rash  Psych: no depression  Endocrine: no polyuria, polydipsia  Heme/lymph: no swelling    VITALS: T(C): 36.3 (01-30-25 @ 01:16)  T(F): 97.4 (01-30-25 @ 01:16), Max: 98.4 (01-29-25 @ 05:06)  HR: 82 (01-30-25 @ 01:16) (78 - 96)  BP: 107/62 (01-30-25 @ 01:16) (95/68 - 124/52)  RR:  (18 - 19)  SpO2:  (95% - 98%)  Wt(kg): --  General: AAO x 3, appropriate mood and affect    Ophthalmology Exam:  Visual acuity (sc): 20/25 OD 20/30 PH20/25 OS  Pupils: PERRL OU, no APD  Ttono: 16 OU  Extraocular movements (EOMs): Full OU, no pain, no diplopia  Confrontational Visual Field (CVF): Upper left quadrantopsia OU  Color Plates: 6/12 OD 1/12 OS    Pen Light Exam (PLE)  External: Flat OU  Lids/Lashes/Lacrimal Ducts: Flat OU    Sclera/Conjunctiva: W+Q OU  Cornea: Cl OU  Anterior Chamber: D+F OU    Iris: Flat OU  Lens: Cl OU    Fundus Exam: dilated with 1% tropicamide and 2.5% phenylephrine  Approval obtained from primary team for dilation  Patient aware that pupils can remained dilated for at least 4-6 hours  Exam performed with 20D lens    Vitreous: wnl OU  Disc, cup/disc: sharp and pink, 0.4 OU  Macula: wnl OU  Vessels: wnl OU  Periphery: wnl OU    Labs/Imaging:  ***   [facility] DEPARTMENT OF OPHTHALMOLOGY - INITIAL ADULT CONSULT  -----------------------------------------------------------------------------------------------------------------  Zackery Hernandez MD  PGY 2  Contact on Teams  -----------------------------------------------------------------------------------------------------------------    HPI:  64F, hx of CHF (last TTE on 1/16/25 EF 30-35%, G2DD), DM, diabetic foot ulcer with a recent admission at [facility] for CHF exacerbation presented as a transfer for worsening R. leg pain and fluid secretion, On non-invasive imaging demonstrating severe depletion of RLE blood flow beyond the popliteal vessel at knee and similar findings in L. Was transferred to [facility] for CO2 angiogram with Dr. Baltazar. (29 Jan 2025 20:05)    Interval History: 64F POH CE/PCIOL OU, Non-clearing VH s/p PPV, PDR s/p PRP OU, s/p LAVELL OU (last injection 3 years ago; Follows with Dr. Laguna) PMH CHF, T2D admitted for vascular surgery management of leg pain and secretion, now complaining of 2-3 day history of blurriness and darkening of vision OU. States graying of vision has been getting worse while inpatient. Denies flashes, floaters, blackout vision, fieled cuts, eye pain, redness, irritation, FBS, tearing, double vision, or pain with EOMs. Ophtho consulted for further workup of vision disturbance    PAST MEDICAL & SURGICAL HISTORY:  DM (diabetes mellitus)      Diabetic foot ulcer      Congestive heart failure (CHF)      CAD (coronary artery disease)      Cataract of both eyes, unspecified cataract type      History of cholecystectomy        Past Ocular History: CE/PCIOL OU, Non-clearing VH s/p PPV, PDR s/p PRP OU, s/p LAVELL OU (last injection 3 years ago; Follows with Dr. Laguna)   Ophthalmic Medications: None  FAMILY HISTORY:  Family history of CHF (congestive heart failure) (Mother)      MEDICATIONS  (STANDING):  acetaminophen     Tablet .. 650 milliGRAM(s) Oral every 8 hours  amoxicillin  500 milliGRAM(s)/clavulanate 1 Tablet(s) Oral every 12 hours  atorvastatin 40 milliGRAM(s) Oral at bedtime  dextrose 5%. 1000 milliLiter(s) (50 mL/Hr) IV Continuous <Continuous>  dextrose 5%. 1000 milliLiter(s) (100 mL/Hr) IV Continuous <Continuous>  dextrose 50% Injectable 25 Gram(s) IV Push once  dextrose 50% Injectable 12.5 Gram(s) IV Push once  dextrose 50% Injectable 25 Gram(s) IV Push once  DOBUTamine Infusion 2.5 MICROgram(s)/kG/Min (5.41 mL/Hr) IV Continuous <Continuous>  glucagon  Injectable 1 milliGRAM(s) IntraMuscular once  heparin  Infusion 14 Unit(s)/Hr (14 mL/Hr) IV Continuous <Continuous>  insulin glargine Injectable (LANTUS) 16 Unit(s) SubCutaneous at bedtime  insulin lispro (ADMELOG) corrective regimen sliding scale   SubCutaneous three times a day before meals  lactated ringers. 1000 milliLiter(s) (40 mL/Hr) IV Continuous <Continuous>  metoprolol succinate ER 25 milliGRAM(s) Oral daily  polyethylene glycol 3350 17 Gram(s) Oral daily  senna 2 Tablet(s) Oral at bedtime    MEDICATIONS  (PRN):  dextrose Oral Gel 15 Gram(s) Oral once PRN Blood Glucose LESS THAN 70 milliGRAM(s)/deciliter  melatonin 3 milliGRAM(s) Oral at bedtime PRN Insomnia  ondansetron Injectable 4 milliGRAM(s) IV Push every 6 hours PRN Nausea and/or Vomiting  oxyCODONE    IR 5 milliGRAM(s) Oral every 6 hours PRN Severe Pain (7 - 10)    Allergies & Intolerances:   No Known Allergies    Review of Systems:  Constitutional: No fever, chills  Eyes: as above  Neuro: No tremors  Cardiovascular: No chest pain, palpitations  Respiratory: No SOB, no cough  GI: No nausea, vomiting, abdominal pain  : No dysuria  Skin: no rash  Psych: no depression  Endocrine: no polyuria, polydipsia  Heme/lymph: no swelling    VITALS: T(C): 36.3 (01-30-25 @ 01:16)  T(F): 97.4 (01-30-25 @ 01:16), Max: 98.4 (01-29-25 @ 05:06)  HR: 82 (01-30-25 @ 01:16) (78 - 96)  BP: 107/62 (01-30-25 @ 01:16) (95/68 - 124/52)  RR:  (18 - 19)  SpO2:  (95% - 98%)  Wt(kg): --  General: AAO x 3, appropriate mood and affect    Ophthalmology Exam:  Visual acuity (sc): 20/25 OD 20/30 PH20/25 OS  Pupils: PERRL OU, no APD  Ttono: 16 OU  Extraocular movements (EOMs): Full OU, no pain, no diplopia  Confrontational Visual Field (CVF): Upper left quadrantopsia OU  Color Plates: 6/12 OD 1/12 OS    Pen Light Exam (PLE)  External: Flat OU  Lids/Lashes/Lacrimal Ducts: Flat OU    Sclera/Conjunctiva: W+Q OU  Cornea: DTBUT OU  Anterior Chamber: D+F OU    Iris: Flat OU  Lens: Cl OU    Fundus Exam: dilated with 1% tropicamide and 2.5% phenylephrine  Approval obtained from primary team for dilation  Patient aware that pupils can remained dilated for at least 4-6 hours  Exam performed with 20D lens    Vitreous: PVD OD   Disc, cup/disc: pale, regressed NVD, 0.4 OU  Macula: wnl OS, blunted reflex OD  Vessels: wnl OU  Periphery: DBH, 360 PRP OU     [facility] DEPARTMENT OF OPHTHALMOLOGY - INITIAL ADULT CONSULT  -----------------------------------------------------------------------------------------------------------------  Zackery Hernandez MD  PGY 2  Contact on Teams  -----------------------------------------------------------------------------------------------------------------    HPI:  64F, hx of CHF (last TTE on 1/16/25 EF 30-35%, G2DD), DM, diabetic foot ulcer with a recent admission at [facility] for CHF exacerbation presented as a transfer for worsening R. leg pain and fluid secretion, On non-invasive imaging demonstrating severe depletion of RLE blood flow beyond the popliteal vessel at knee and similar findings in L. Was transferred to [facility] for CO2 angiogram with Dr. Baltazar. (29 Jan 2025 20:05)    Interval History: 64F POH CE/PCIOL OU, Non-clearing VH s/p PPV, PDR s/p PRP OU, s/p LAVELL OU (last injection 3 years ago; Follows with Dr. Laguna) PMH CHF, T2D admitted for vascular surgery management of leg pain and secretion, now complaining of 2-3 day history of blurriness and darkening of vision OU. States graying of vision has been getting worse while inpatient. Denies flashes, floaters, blackout vision, fieled cuts, eye pain, redness, irritation, FBS, tearing, double vision, or pain with EOMs. Ophtho consulted for further workup of vision disturbance    PAST MEDICAL & SURGICAL HISTORY:  DM (diabetes mellitus)      Diabetic foot ulcer      Congestive heart failure (CHF)      CAD (coronary artery disease)      Cataract of both eyes, unspecified cataract type      History of cholecystectomy        Past Ocular History: CE/PCIOL OU, Non-clearing VH s/p PPV, PDR s/p PRP OU, s/p LAVELL OU (last injection 3 years ago; Follows with Dr. Laguna)   Ophthalmic Medications: None  FAMILY HISTORY:  Family history of CHF (congestive heart failure) (Mother)      MEDICATIONS  (STANDING):  acetaminophen     Tablet .. 650 milliGRAM(s) Oral every 8 hours  amoxicillin  500 milliGRAM(s)/clavulanate 1 Tablet(s) Oral every 12 hours  atorvastatin 40 milliGRAM(s) Oral at bedtime  dextrose 5%. 1000 milliLiter(s) (50 mL/Hr) IV Continuous <Continuous>  dextrose 5%. 1000 milliLiter(s) (100 mL/Hr) IV Continuous <Continuous>  dextrose 50% Injectable 25 Gram(s) IV Push once  dextrose 50% Injectable 12.5 Gram(s) IV Push once  dextrose 50% Injectable 25 Gram(s) IV Push once  DOBUTamine Infusion 2.5 MICROgram(s)/kG/Min (5.41 mL/Hr) IV Continuous <Continuous>  glucagon  Injectable 1 milliGRAM(s) IntraMuscular once  heparin  Infusion 14 Unit(s)/Hr (14 mL/Hr) IV Continuous <Continuous>  insulin glargine Injectable (LANTUS) 16 Unit(s) SubCutaneous at bedtime  insulin lispro (ADMELOG) corrective regimen sliding scale   SubCutaneous three times a day before meals  lactated ringers. 1000 milliLiter(s) (40 mL/Hr) IV Continuous <Continuous>  metoprolol succinate ER 25 milliGRAM(s) Oral daily  polyethylene glycol 3350 17 Gram(s) Oral daily  senna 2 Tablet(s) Oral at bedtime    MEDICATIONS  (PRN):  dextrose Oral Gel 15 Gram(s) Oral once PRN Blood Glucose LESS THAN 70 milliGRAM(s)/deciliter  melatonin 3 milliGRAM(s) Oral at bedtime PRN Insomnia  ondansetron Injectable 4 milliGRAM(s) IV Push every 6 hours PRN Nausea and/or Vomiting  oxyCODONE    IR 5 milliGRAM(s) Oral every 6 hours PRN Severe Pain (7 - 10)    Allergies & Intolerances:   No Known Allergies    Review of Systems:  Constitutional: No fever, chills  Eyes: as above  Neuro: No tremors  Cardiovascular: No chest pain, palpitations  Respiratory: No SOB, no cough  GI: No nausea, vomiting, abdominal pain  : No dysuria  Skin: no rash  Psych: no depression  Endocrine: no polyuria, polydipsia  Heme/lymph: no swelling    VITALS: T(C): 36.3 (01-30-25 @ 01:16)  T(F): 97.4 (01-30-25 @ 01:16), Max: 98.4 (01-29-25 @ 05:06)  HR: 82 (01-30-25 @ 01:16) (78 - 96)  BP: 107/62 (01-30-25 @ 01:16) (95/68 - 124/52)  RR:  (18 - 19)  SpO2:  (95% - 98%)  Wt(kg): --  General: AAO x 3, appropriate mood and affect    Ophthalmology Exam:  Visual acuity (sc): 20/25 OD 20/30 PH20/25 OS  Pupils: PERRL OU, no APD  Ttono: 16 OU  Extraocular movements (EOMs): Full OU, no pain, no diplopia  Confrontational Visual Field (CVF): Upper left quadrantopsia OU  Color Plates: 6/12 OD 1/12 OS    Pen Light Exam (PLE)  External: Flat OU  Lids/Lashes/Lacrimal Ducts: Flat OU    Sclera/Conjunctiva: W+Q OU  Cornea: DTBUT OU  Anterior Chamber: D+F OU    Iris: Flat OU  Lens: Cl OU    Fundus Exam: dilated with 1% tropicamide and 2.5% phenylephrine  Approval obtained from primary team for dilation  Patient aware that pupils can remained dilated for at least 4-6 hours  Exam performed with 20D lens    Vitreous: PVD OD   Disc, cup/disc: pale, regressed NVD, 0.4 OU  Macula: appears flat OS, blunted reflex OD  Vessels: wnl OU  Periphery: DBH, 360 PRP OU     [facility] DEPARTMENT OF OPHTHALMOLOGY - INITIAL ADULT CONSULT  -----------------------------------------------------------------------------------------------------------------  -----------------------------------------------------------------------------------------------------------------    HPI:  64F, hx of CHF (last TTE on 1/16/25 EF 30-35%, G2DD), DM, diabetic foot ulcer with a recent admission at [facility] for CHF exacerbation presented as a transfer for worsening R. leg pain and fluid secretion, On non-invasive imaging demonstrating severe depletion of RLE blood flow beyond the popliteal vessel at knee and similar findings in L. Was transferred to [facility] for CO2 angiogram with Dr. Baltazar. (29 Jan 2025 20:05)    Interval History: 64F POH CE/PCIOL OU, Non-clearing VH s/p PPV, PDR s/p PRP OU, s/p LAVELL OU (last injection 3 years ago; Follows with Dr. Laguna) PMH CHF, T2D admitted for vascular surgery management of leg pain and secretion, now complaining of 2-3 day history of blurriness described as slightly foggy vision OU. Denies flashes, floaters, blackout vision, fieled cuts, eye pain, redness, irritation, FBS, tearing, double vision, or pain with EOMs. Ophtho consulted for further workup of vision disturbance    PAST MEDICAL & SURGICAL HISTORY:  DM (diabetes mellitus)      Diabetic foot ulcer      Congestive heart failure (CHF)      CAD (coronary artery disease)      Cataract of both eyes, unspecified cataract type      History of cholecystectomy        Past Ocular History: CE/PCIOL OU, Non-clearing VH s/p PPV, PDR s/p PRP OU, s/p LAVELL OU (last injection 3 years ago; Follows with Dr. Laguna)   Ophthalmic Medications: None  FAMILY HISTORY:  Family history of CHF (congestive heart failure) (Mother)      MEDICATIONS  (STANDING):  acetaminophen     Tablet .. 650 milliGRAM(s) Oral every 8 hours  amoxicillin  500 milliGRAM(s)/clavulanate 1 Tablet(s) Oral every 12 hours  atorvastatin 40 milliGRAM(s) Oral at bedtime  dextrose 5%. 1000 milliLiter(s) (50 mL/Hr) IV Continuous <Continuous>  dextrose 5%. 1000 milliLiter(s) (100 mL/Hr) IV Continuous <Continuous>  dextrose 50% Injectable 25 Gram(s) IV Push once  dextrose 50% Injectable 12.5 Gram(s) IV Push once  dextrose 50% Injectable 25 Gram(s) IV Push once  DOBUTamine Infusion 2.5 MICROgram(s)/kG/Min (5.41 mL/Hr) IV Continuous <Continuous>  glucagon  Injectable 1 milliGRAM(s) IntraMuscular once  heparin  Infusion 14 Unit(s)/Hr (14 mL/Hr) IV Continuous <Continuous>  insulin glargine Injectable (LANTUS) 16 Unit(s) SubCutaneous at bedtime  insulin lispro (ADMELOG) corrective regimen sliding scale   SubCutaneous three times a day before meals  lactated ringers. 1000 milliLiter(s) (40 mL/Hr) IV Continuous <Continuous>  metoprolol succinate ER 25 milliGRAM(s) Oral daily  polyethylene glycol 3350 17 Gram(s) Oral daily  senna 2 Tablet(s) Oral at bedtime    MEDICATIONS  (PRN):  dextrose Oral Gel 15 Gram(s) Oral once PRN Blood Glucose LESS THAN 70 milliGRAM(s)/deciliter  melatonin 3 milliGRAM(s) Oral at bedtime PRN Insomnia  ondansetron Injectable 4 milliGRAM(s) IV Push every 6 hours PRN Nausea and/or Vomiting  oxyCODONE    IR 5 milliGRAM(s) Oral every 6 hours PRN Severe Pain (7 - 10)    Allergies & Intolerances:   No Known Allergies    Review of Systems:  Constitutional: No fever, chills  Eyes: as above  Neuro: No tremors  Cardiovascular: No chest pain, palpitations  Respiratory: No SOB, no cough  GI: No nausea, vomiting, abdominal pain  : No dysuria  Skin: no rash  Psych: no depression  Endocrine: no polyuria, polydipsia  Heme/lymph: no swelling    VITALS: T(C): 36.3 (01-30-25 @ 01:16)  T(F): 97.4 (01-30-25 @ 01:16), Max: 98.4 (01-29-25 @ 05:06)  HR: 82 (01-30-25 @ 01:16) (78 - 96)  BP: 107/62 (01-30-25 @ 01:16) (95/68 - 124/52)  RR:  (18 - 19)  SpO2:  (95% - 98%)  Wt(kg): --  General: AAO x 3, appropriate mood and affect    Ophthalmology Exam:  Visual acuity (sc): 20/25 OD 20/30 PH20/25 OS  Pupils: PERRL OU, no APD  Ttono: 16 OU  Extraocular movements (EOMs): Full OU, no pain, no diplopia  Confrontational Visual Field (CVF): Upper left quadrantopsia OU  Color Plates: 6/12 OD 1/12 OS    Pen Light Exam (PLE)  External: Flat OU  Lids/Lashes/Lacrimal Ducts: Flat OU    Sclera/Conjunctiva: W+Q OU  Cornea: DTBUT OU  Anterior Chamber: D+F OU    Iris: Flat OU  Lens: Cl OU    Fundus Exam: dilated with 1% tropicamide and 2.5% phenylephrine  Approval obtained from primary team for dilation  Patient aware that pupils can remained dilated for at least 4-6 hours  Exam performed with 20D lens    Vitreous: PVD OD   Disc, cup/disc: pale, regressed NVD, 0.4 OU  Macula: appears flat OS, blunted reflex OD  Vessels: wnl OU  Periphery: DBH, 360 PRP OU

## 2025-01-30 NOTE — PROGRESS NOTE ADULT - SUBJECTIVE AND OBJECTIVE BOX
VASCULAR SURGERY DAILY PROGRESS NOTE:     SUBJECTIVE/ROS: Patient seen and examined. she is resting comfortably, no new events/complaints. denies chest pain, sob.     MEDICATIONS  (STANDING):  acetaminophen     Tablet .. 650 milliGRAM(s) Oral every 8 hours  albumin human 25% IVPB 50 milliLiter(s) IV Intermittent every 6 hours  amoxicillin  500 milliGRAM(s)/clavulanate 1 Tablet(s) Oral every 12 hours  atorvastatin 40 milliGRAM(s) Oral at bedtime  dextrose 5%. 1000 milliLiter(s) (50 mL/Hr) IV Continuous <Continuous>  dextrose 5%. 1000 milliLiter(s) (100 mL/Hr) IV Continuous <Continuous>  dextrose 50% Injectable 25 Gram(s) IV Push once  dextrose 50% Injectable 12.5 Gram(s) IV Push once  dextrose 50% Injectable 25 Gram(s) IV Push once  DOBUTamine Infusion 2.5 MICROgram(s)/kG/Min (5.41 mL/Hr) IV Continuous <Continuous>  glucagon  Injectable 1 milliGRAM(s) IntraMuscular once  heparin  Infusion 14 Unit(s)/Hr (14 mL/Hr) IV Continuous <Continuous>  insulin glargine Injectable (LANTUS) 16 Unit(s) SubCutaneous at bedtime  insulin lispro (ADMELOG) corrective regimen sliding scale   SubCutaneous three times a day before meals  metoprolol succinate ER 25 milliGRAM(s) Oral daily  polyethylene glycol 3350 17 Gram(s) Oral daily  senna 2 Tablet(s) Oral at bedtime    MEDICATIONS  (PRN):  dextrose Oral Gel 15 Gram(s) Oral once PRN Blood Glucose LESS THAN 70 milliGRAM(s)/deciliter  melatonin 3 milliGRAM(s) Oral at bedtime PRN Insomnia  ondansetron Injectable 4 milliGRAM(s) IV Push every 6 hours PRN Nausea and/or Vomiting  oxyCODONE    IR 5 milliGRAM(s) Oral every 6 hours PRN Severe Pain (7 - 10)      OBJECTIVE:    Vital Signs Last 24 Hrs  T(C): 36.6 (30 Jan 2025 09:20), Max: 36.6 (29 Jan 2025 11:16)  T(F): 97.8 (30 Jan 2025 09:20), Max: 97.9 (29 Jan 2025 11:16)  HR: 83 (30 Jan 2025 09:20) (78 - 96)  BP: 128/66 (30 Jan 2025 09:20) (95/68 - 128/66)  BP(mean): --  RR: 18 (30 Jan 2025 09:20) (18 - 19)  SpO2: 96% (30 Jan 2025 09:20) (95% - 98%)    Parameters below as of 30 Jan 2025 09:20  Patient On (Oxygen Delivery Method): room air            I&O's Detail    29 Jan 2025 07:01  -  30 Jan 2025 07:00  --------------------------------------------------------  IN:    DOBUTamine: 54 mL    Heparin: 140 mL    Lactated Ringers: 320 mL    Oral Fluid: 120 mL  Total IN: 634 mL    OUT:  Total OUT: 0 mL    Total NET: 634 mL      30 Jan 2025 07:01  -  30 Jan 2025 09:54  --------------------------------------------------------  IN:    Oral Fluid: 120 mL  Total IN: 120 mL    OUT:  Total OUT: 0 mL    Total NET: 120 mL          Daily     Daily     LABS:                        9.5    11.10 )-----------( 400      ( 30 Jan 2025 07:03 )             27.3     01-30    130[L]  |  90[L]  |  72[H]  ----------------------------<  196[H]  5.0   |  22  |  4.27[H]    Ca    8.8      30 Jan 2025 07:00  Phos  6.1     01-30  Mg     2.2     01-30    TPro  7.5  /  Alb  2.4[L]  /  TBili  0.4  /  DBili  x   /  AST  21  /  ALT  31  /  AlkPhos  83  01-29    PTT - ( 30 Jan 2025 07:06 )  PTT:>200.0 sec  Urinalysis Basic - ( 30 Jan 2025 07:00 )    Color: x / Appearance: x / SG: x / pH: x  Gluc: 196 mg/dL / Ketone: x  / Bili: x / Urobili: x   Blood: x / Protein: x / Nitrite: x   Leuk Esterase: x / RBC: x / WBC x   Sq Epi: x / Non Sq Epi: x / Bacteria: x      Physical:  General: A&Ox3. NAD.  LLE: 2+ pitting edema of lower leg. Stable ulcerated toes. Sensation and ROM intact.   RLE: 2+ pitting edema of lower leg. Toes cooler compared to right. Sensation and ROM intact.

## 2025-01-30 NOTE — CONSULT NOTE ADULT - SUBJECTIVE AND OBJECTIVE BOX
[facility] NEPHROLOGY- CONSULTATION NOTE    64y Female with history of below presents as a transfer for LE CO2 angiogram. Nephrology consulted for elevated Scr.    Patient known from [facility] for JAMEL thought to be secondary to ATN in setting of hypotension and infection. ARNI and diuretics discontinued and patient started on  gtt given h/o CHF. Scr continues to increase.    REVIEW OF SYSTEMS:  Gen: no fevers  HEENT: no rhinorrhea  Neck: no sore throat  Cards: no chest pain  Resp: no dyspnea  GI: no nausea or vomiting or diarrhea  : no dysuria or hematuria  Vascular: + LE edema, + foot pain  Derm: no rashes  Neuro: no numbness/tingling    No Known Allergies      Home Medications Reviewed  Hospital Medications:   MEDICATIONS  (STANDING):  acetaminophen     Tablet .. 650 milliGRAM(s) Oral every 8 hours  amoxicillin  500 milliGRAM(s)/clavulanate 1 Tablet(s) Oral every 12 hours  atorvastatin 40 milliGRAM(s) Oral at bedtime  dextrose 5%. 1000 milliLiter(s) (50 mL/Hr) IV Continuous <Continuous>  dextrose 5%. 1000 milliLiter(s) (100 mL/Hr) IV Continuous <Continuous>  dextrose 50% Injectable 25 Gram(s) IV Push once  dextrose 50% Injectable 12.5 Gram(s) IV Push once  dextrose 50% Injectable 25 Gram(s) IV Push once  DOBUTamine Infusion 2.5 MICROgram(s)/kG/Min (5.41 mL/Hr) IV Continuous <Continuous>  glucagon  Injectable 1 milliGRAM(s) IntraMuscular once  heparin  Infusion 14 Unit(s)/Hr (14 mL/Hr) IV Continuous <Continuous>  insulin glargine Injectable (LANTUS) 16 Unit(s) SubCutaneous at bedtime  insulin lispro (ADMELOG) corrective regimen sliding scale   SubCutaneous three times a day before meals  lactated ringers. 1000 milliLiter(s) (40 mL/Hr) IV Continuous <Continuous>  metoprolol succinate ER 25 milliGRAM(s) Oral daily  polyethylene glycol 3350 17 Gram(s) Oral daily  senna 2 Tablet(s) Oral at bedtime      PAST MEDICAL & SURGICAL HISTORY:  DM (diabetes mellitus)      Diabetic foot ulcer      Congestive heart failure (CHF)      CAD (coronary artery disease)      Cataract of both eyes, unspecified cataract type      History of cholecystectomy          FAMILY HISTORY:  Family history of CHF (congestive heart failure) (Mother)        SOCIAL HISTORY:  Denies toxic substance use     VITALS:  T(F): 97.4 (25 @ 05:46), Max: 97.9 (25 @ 11:16)  HR: 86 (25 @ 05:46)  BP: 118/67 (25 @ 05:46)  RR: 18 (25 @ 05:46)  SpO2: 95% (25 @ 05:46)  Wt(kg): --     @ 07:  -   @ 07:00  --------------------------------------------------------  IN: 634 mL / OUT: 0 mL / NET: 634 mL      Height (cm): 162.6 ( @ 21:11)  Weight (kg): 72.121 ( 21:11)  BMI (kg/m2): 27.3 (:11)  BSA (m2): 1.77 ( 21:11)    PHYSICAL EXAM:  Gen: NAD, calm  HEENT: MMM  Neck: no JVD  Cards: RRR, +S1/S2, no M/G/R  Resp: Bibasilar rales  GI: soft, NT/ND, NABS  : no CVA tenderness  Vascular: 2+ LE edema B/L  Derm: no rashes  Neuro: non-focal    LABS:      130[L]  |  90[L]  |  72[H]  ----------------------------<  196[H]  5.0   |  22  |  4.27[H]    Ca    8.8      2025 07:00  Phos  6.1       Mg     2.2         TPro  7.5  /  Alb  2.4[L]  /  TBili  0.4  /  DBili      /  AST  21  /  ALT  31  /  AlkPhos  83      Creatinine Trend: 4.27 <--, 3.60 <--, 2.36 <--, 1.55 <--, 1.25 <--, 1.25 <--, 1.17 <--, 1.11 <--                        9.5    11.10 )-----------( 400      ( 2025 07:03 )             27.3     Urine Studies:  Urinalysis Basic - ( 2025 07:00 )    Color:  / Appearance:  / SG:  / pH:   Gluc: 196 mg/dL / Ketone:   / Bili:  / Urobili:    Blood:  / Protein:  / Nitrite:    Leuk Esterase:  / RBC:  / WBC    Sq Epi:  / Non Sq Epi:  / Bacteria:       Creatinine, Random Urine: 102 mg/dL ( @ 11:53)  Sodium, Random Urine: 10 mmol/L ( @ 13:10)  Potassium, Random Urine: 43 mmol/L ( @ 13:10)  Creatinine, Random Urine: 122 mg/dL ( @ 13:10)  Protein/Creatinine Ratio Calculation: 0.2 Ratio ( @ 13:10)  Calcium, Random Urine: 1.0 mg/dL ( @ 13:10)      RADIOLOGY & ADDITIONAL STUDIES:      < from: CT Chest No Cont (25 @ 14:08) >  IMPRESSION: Small bilateral pleural effusions and small pericardial   effusion.    --- End of Report ---    < end of copied text >      < from: Xray Chest 2 Views PA/Lat (25 @ 12:19) >  IMPRESSION:    Wound at the dorsal forefoot evident on the lateral view    Mild to moderate circumferential subcutaneous soft tissue edema with no   gas or gross cortical destruction. If there is continued clinical concern   follow-up MRI can be ordered    --- End of Report ---    < end of copied text >

## 2025-01-30 NOTE — CONSULT NOTE ADULT - SUBJECTIVE AND OBJECTIVE BOX
MR#[id number]  PATIENT NAME:COLE ALBA    DATE OF SERVICE: 01-30-25 @ 06:35  Patient was seen and examined by Yordy Gilmore MD on    01-30-25 @ 06:35 .  Interim events noted.Consultant notes ,Labs,Telemetry reviewed by me       HOSPITAL COURSE: HPI:  64F, hx of CHF (last TTE on 1/16/25 EF 30-35%, G2DD), DM, diabetic foot ulcer with a recent admission at [facility] for CHF exacerbation presented as a transfer for worsening R. leg pain and fluid secretion, On non-invasive imaging demonstrating severe depletion of RLE blood flow beyond the popliteal vessel at knee and similar findings in L. Was transferred to [facility] for CO2 angiogram with Dr. Baltazar. (29 Jan 2025 20:05)    Transferred from [facility]-TTE on previous admission: LVSF moderately decreased w/EF 30-35%. Moderate G2DD. Mild MR. Ischemic evaluation was recommended for which patient undergone stress test which revealed a small-sized moderate defect in the apical wall that is predominantly fixed suggestive of an infarction with minimal marcus-infarct ischemia. 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 Left leg Arterial Duplex: Slightly tardus parvus waveform of the left popliteal artery is noted, for which underlying disease cannot be excluded. Posterior tibial artery waveform is nonpulsatile. Anterior tibial artery tardus parvus flow is noted. Patient started on Heparin and Dobutamine drip with transfer to [facility] for further management.      INTERIM EVENTS:Patient seen at bedside ,interim events noted.  Awake alert seen by Opthal-for altered vision,no chest pain still c/o Right LE pain   Dobutamine resumed      PMH -reviewed admission note, no change since admission  HEART FAILURE: Acute[x ]Chronic[x ] Systolic[x ] Diastolic[ ] Combined Systolic and Diastolic[ ]  CAD[ ] CABG[ ] PCI[ ]  DEVICES[ ] PPM[ ] ICD[ ] ILR[ ]  ATRIAL FIBRILLATION[ ] Paroxysmal[ ] Permanent[ ] CHADS2-[  ]  JAMEL[x ] CKD1[ ] CKD2[ ] CKD3[ ] CKD4[ ] ESRD[ ]  COPD[ ] HTN[x ]   DM[x ] Type1[ ] Type 2[x ]   CVA[ ] Paresis[ ]    AMBULATION: Assisted[ ] Cane/walker[x ] Independent[ ]    MEDICATIONS  (STANDING):  acetaminophen     Tablet .. 650 milliGRAM(s) Oral every 8 hours  amoxicillin  500 milliGRAM(s)/clavulanate 1 Tablet(s) Oral every 12 hours  atorvastatin 40 milliGRAM(s) Oral at bedtime  DOBUTamine Infusion 2.5 MICROgram(s)/kG/Min (5.41 mL/Hr) IV Continuous <Continuous>  glucagon  Injectable 1 milliGRAM(s) IntraMuscular once  heparin  Infusion 14 Unit(s)/Hr (14 mL/Hr) IV Continuous <Continuous>  insulin glargine Injectable (LANTUS) 16 Unit(s) SubCutaneous at bedtime  insulin lispro (ADMELOG) corrective regimen sliding scale   SubCutaneous three times a day before meals  lactated ringers. 1000 milliLiter(s) (40 mL/Hr) IV Continuous <Continuous>  metoprolol succinate ER 25 milliGRAM(s) Oral daily  polyethylene glycol 3350 17 Gram(s) Oral daily  senna 2 Tablet(s) Oral at bedtime    MEDICATIONS  (PRN):  dextrose Oral Gel 15 Gram(s) Oral once PRN Blood Glucose LESS THAN 70 milliGRAM(s)/deciliter  melatonin 3 milliGRAM(s) Oral at bedtime PRN Insomnia  ondansetron Injectable 4 milliGRAM(s) IV Push every 6 hours PRN Nausea and/or Vomiting  oxyCODONE    IR 5 milliGRAM(s) Oral every 6 hours PRN Severe Pain (7 - 10)            REVIEW OF SYSTEMS:  Constitutional: [ ] fever, [ ]weight loss,  [x ]fatigue [ ]weight gain  Eyes: [ ] visual changes  Respiratory: [ ]shortness of breath;  [ ] cough, [ ]wheezing, [ ]chills, [ ]hemoptysis  Cardiovascular: [ ] chest pain, [ ]palpitations, [ ]dizziness,  [ ]leg swelling[ ]orthopnea[ ]PND  Gastrointestinal: [ ] abdominal pain, [ ]nausea, [ ]vomiting,  [ ]diarrhea [ ]Constipation [ ]Melena  Genitourinary: [ ] dysuria, [ ] hematuria [ ][place]  Neurologic: [ ] headaches [ ] tremors[ ]weakness [ ]Paralysis Right[ ] Left[ ]  Skin: [ ] itching, [ ]burning, [ ] rashes  Endocrine: [ ] heat or cold intolerance  Musculoskeletal: [ ] joint pain or swelling; [x ] muscle, back, or extremity pain  Psychiatric: [ ] depression, [ ]anxiety, [ ]mood swings, or [ ]difficulty sleeping  Hematologic: [ ] easy bruising, [ ] bleeding gums    [ ] All remaining systems negative except as per above.   [ ]Unable to obtain.  [x] No change in ROS since admission      Vital Signs Last 24 Hrs  T(C): 36.3 (30 Jan 2025 05:46), Max: 36.6 (29 Jan 2025 11:16)  T(F): 97.4 (30 Jan 2025 05:46), Max: 97.9 (29 Jan 2025 11:16)  HR: 86 (30 Jan 2025 05:46) (78 - 96)  BP: 118/67 (30 Jan 2025 05:46) (95/68 - 124/52)  RR: 18 (30 Jan 2025 05:46) (18 - 19)  SpO2: 95% (30 Jan 2025 05:46) (95% - 98%)    Parameters below as of 30 Jan 2025 05:46  Patient On (Oxygen Delivery Method): room air      I&O's Summary    29 Jan 2025 07:01  -  30 Jan 2025 06:35  --------------------------------------------------------  IN: 356.4 mL / OUT: 0 mL / NET: 356.4 mL        PHYSICAL EXAM:  General: No acute distress BMI-27.3  HEENT: EOMI, PERRL  Neck: Supple, [ ] JVD  Lungs: Equal air entry bilaterally; [ ] rales [ ] wheezing [ ] rhonchi  Heart: Regular rate and rhythm; [x ] murmur   2/6 [ x] systolic [ ] diastolic [ ] radiation[ ] rubs [ ]  gallops  Abdomen: Nontender, bowel sounds present  Extremities: No clubbing, cyanosis, [ ] edema [x ]: several chronic wounds noted over the b/l lower extremities, worse on R than left   Vasc: B/l LE somewhat cold to the touch beyond the knee, no appreciable doppler signals in b/l LE   Nervous system:  Alert & Oriented X3, no focal deficits  Psychiatric: Normal affect  Skin: No rashes or lesions    LABS:  01-29    134[L]  |  97  |  69[H]  ----------------------------<  81  4.5   |  22  |  3.60[H]    Ca    8.6      29 Jan 2025 07:22  Phos  5.4     01-29  Mg     2.3     01-29    TPro  7.5  /  Alb  2.4[L]  /  TBili  0.4  /  DBili  x   /  AST  21  /  ALT  31  /  AlkPhos  83  01-29    Creatinine Trend: 3.60<--, 2.36<--, 1.55<--, 1.25<--, 1.25<--, 1.17<--                        9.4    11.48 )-----------( 396      ( 29 Jan 2025 07:22 )             26.8     PTT - ( 29 Jan 2025 07:22 )  PTT:68.5 sec      TTE W or WO Ultrasound Enhancing Agent (01.16.25 @ 12:45) >  CONCLUSIONS:      1. Left ventricular systolic function is moderately decreased with an ejection fraction visually estimated at 30 to 35 %.   2. There is moderate (grade 2) left ventricular diastolic dysfunction.   3. Mild mitral regurgitation.   4. Trace tricuspid regurgitation.   5. Trace pulmonic regurgitation.   6. Trace pericardial effusion.   7. Right pleural effusion noted.   8. No prior echocardiogram is available for comparison.     12 Lead ECG (01.23.25 @ 10:23) >  Sinus tachycardia  Cannot rule out Anterior infarct , age undetermined  ST & T wave abnormality, consider inferolateral ischemia  Abnormal ECG        US Physiol Extremity Lower 3+ Level, BI (01.27.25 @ 14:28) >  IMPRESSION:    Limited CHARMAINE-PVR evaluation due to noncompressible vasculature. Subsequent  arterial duplex ultrasound was performed due to abnormal PVR findings,  particularly of the right leg.    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 Correlate for right leg ischemia.    Left leg Arterial Duplex: Slightly tardus parvus waveform of the left  popliteal artery is noted, for which underlying disease cannot be  excluded. Posterior tibial artery waveform is nonpulsatile. Anterior  tibial artery tardus parvus flow is noted. Correlate for left leg  ischemia.    Recommend vascular surgery evaluation and runoff CTA for further  evaluation.    CT Chest No Cont (01.25.25 @ 14:08) >  IMPRESSION: Small bilateral pleural effusions and small pericardial   effusion.        Nuclear Stress Test-Pharmacologic.. (01.24.25 @ 10:31) >  Conclusions:   1. Myocardial Perfusion: Abnormal.   2. Qualitative Perfusion:      - small-sized, moderate defect(s) in the apical wall that is predominantly fixed suggestive of an infarction with minimal marcus-infarct ischemia.   3. The post stress left ventricular EF is 25 %. The stress end diastolic volume is 118 ml.   4. Results D/Wcardiologist Dr. Gilmore at 18:31

## 2025-01-30 NOTE — CONSULT NOTE ADULT - ASSESSMENT
Assessment and Recommendations:  64F POH CE/PCIOL OU, Non-clearing VH s/p PPV, PDR s/p PRP OU, s/p LAVELL OU (last injection 3 years ago; Follows with Dr. Laguna) PMH CHF, T2D admitted for vascular surgery management of leg pain and secretion, now complaining of 2-3 day history of blurriness and darkening of vision OU.    #Visual Disturbance OU  - Pt endorsing progressively worsening blurry/graying vision for past 2-3 days  - BCVA 20/25 OU, no rAPD, color vision 6/12 OD 1/12 OS, IOP wnl, CVF full, EOMs full and without pain.   - Ant exam with DTBUT, no NVI OU  - DFE unremarkable   - DDx includes dry eye vs exacerbation of diabetic retinopathy   - Recommend artificial tears, one drop four times a day, to both eyes   - Ophtho to follow    64y female with a past medical history/ocular history of *** consulted for ***, found to have ***    Seen and discussed with ***.    Outpatient Follow-up: Patient should follow-up with his/her ophthalmologist or with [facility] Department of Ophthalmology within 1 week of after discharge at:    [address]  [place] [address]  [phone]    Zackery Hernandez MD, PGY-2  Also available on [employer] Teams     Assessment and Recommendations:  64F POH CE/PCIOL OU, Non-clearing VH s/p PPV, PDR s/p PRP OU, s/p LAVELL OU (last injection 3 years ago; Follows with Dr. Laguna) PMH CHF, T2D admitted for vascular surgery management of leg pain and secretion, now complaining of 2-3 day history of blurriness and darkening of vision OU.    #Visual Disturbance OU  - Pt endorsing progressively worsening blurry/graying vision for past 2-3 days  - BCVA 20/25 OU, no rAPD, color vision 6/12 OD 1/12 OS, IOP wnl, CVF full, EOMs full and without pain.   - Ant exam with DTBUT, no NVI OU  - DFE with PVD and possible signs of macular edema, also notable regressed NVD, no new NVE or NVD,  - DDx includes dry eye vs macular edema vs PDR exacerbation  - Recommend artificial tears, one drop four times a day, to both eyes   - Ophtho to follow    Outpatient Follow-up: Patient should follow-up with his/her ophthalmologist or with [facility] Department of Ophthalmology within 1 week of after discharge at:    [address]  [place] [address]  [phone]    Zackery Hernandez MD, PGY-2  Also available on [employer] Teams     Assessment and Recommendations:  64F POH CE/PCIOL OU, Non-clearing VH s/p PPV, PDR s/p PRP OU, s/p LAVELL OU (last injection 3 years ago; Follows with Dr. Laguna) PMH CHF, T2D admitted for vascular surgery management of leg pain and secretion, now complaining of 2-3 day history of blurriness and darkening of vision OU.    #Visual Disturbance OU  - Pt endorsing progressively worsening blurry/graying vision for past 2-3 days  - BCVA 20/25 OU, no rAPD, color vision 6/12 OD 1/12 OS, IOP wnl, CVF full, EOMs full and without pain.   - Ant exam with DTBUT, no NVI OU  - DFE with PVD and possible signs of macular edema, also notable regressed NVD, no new NVE or NVD,  - DDx includes dry eye vs macular edema vs PVD   - No acute intervention at this time  - Recommend artificial tears, one drop four times a day, to both eyes   - Ophtho to follow    Outpatient Follow-up: Patient should follow-up with his/her ophthalmologist or with [facility] Department of Ophthalmology within 1 week of after discharge at:    [address]  [place] [address]  [phone]    Zackery Hernandez MD, PGY-2  Also available on Microsoft Teams     Assessment and Recommendations:  64F POH CE/PCIOL OU, Non-clearing VH s/p PPV, PDR s/p PRP OU, s/p LAVELL OU (last injection 3 years ago; Follows with Dr. Laguna) PMH CHF, T2D admitted for vascular surgery management of leg pain and secretion, now complaining of 2-3 day history of blurriness and darkening of vision OU.    #Visual Disturbance OU  - Pt endorsing progressively worsening blurry/graying vision for past 2-3 days  - BCVA 20/25 OU, no rAPD, color vision 6/12 OD 1/12 OS, IOP wnl, CVF full, EOMs full and without pain.   - Ant exam with DTBUT, no NVI OU  - DFE with PVD and ? signs of macular edema, also notable regressed NVD, no new NVE or NVD,  - DDx includes dry eye vs macular edema vs PVD vs retinal ischemia   - No acute intervention at this time  - Recommend artificial tears, one drop four times a day, to both eyes   - Ophtho to follow    Outpatient Follow-up: Patient should follow-up with his/her ophthalmologist or with [facility] Department of Ophthalmology within 1 week of after discharge at:    [address]  [place] [address]  [phone]    Zackery Hernandez MD, PGY-2  Also available on [employer] Teams     Assessment and Recommendations:  64F POH CE/PCIOL OU, Non-clearing VH s/p PPV, PDR s/p PRP OU, s/p LAVELL OU (last injection 3 years ago; Follows with Dr. Laguna) PMH CHF, T2D admitted for vascular surgery management of leg pain and secretion, now complaining of 2-3 day history of blurriness and darkening of vision OU.    #Visual Disturbance OU  - Pt endorsing progressively worsening blurry/graying vision for past 2-3 days  - BCVA 20/25 OU, no rAPD, color vision 6/12 OD 1/12 OS, IOP wnl, CVF full, EOMs full and without pain.   - Ant exam with DTBUT, no NVI OU  - DFE with PVD and ? signs of macular edema, also notable regressed NVD, no new NVE or NVD,  - DDx includes dry eye vs macular edema vs PVD vs retinal ischemia   - No acute ophthalmic intervention at this time  - Recommend TIA work up per primary team  - Recommend artificial tears, one drop four times a day, to both eyes   - Ophtho to follow    Outpatient Follow-up: Patient should follow-up with his/her ophthalmologist or with [facility] Department of Ophthalmology within 1 week of after discharge at:    [address]  [place] [address]  [phone]    Zackery Hernandez MD, PGY-2  Also available on [employer] Teams     Assessment and Recommendations:  64F POH CE/PCIOL OU, Non-clearing VH s/p PPV, PDR s/p PRP OU, s/p LAVELL OU (last injection 3 years ago; Follows with Dr. Laguna) PMH CHF, T2D admitted for vascular surgery management of leg pain and secretion, now complaining of 2-3 day history of blurriness and darkening of vision OU.    #Visual Disturbance OU  - Pt endorsing progressively worsening blurry/graying vision for past 2-3 days  - BCVA 20/25 OU, no rAPD, color vision 6/12 OD 1/12 OS, IOP wnl, CVF full, EOMs full and without pain.   - Ant exam with DTBUT, no NVI OU  - DFE with PVD and ? signs of macular edema, also notable regressed NVD, no new NVE or NVD,  - DDx includes dry eye vs macular edema vs PVD vs retinal ischemia   - No acute ophthalmic intervention at this time  - Recommend artificial tears, one drop four times a day, to both eyes   - Pt to follow up with outpt retina clinic on dc, info below    SDW Dr. Coyle, ophthalmology attending.     Outpatient Follow-up: Patient should follow-up with his/her ophthalmologist or with [facility] Department of Ophthalmology within 1 week of after discharge at:    [facility]  [address]  [place] [address] [phone]     Assessment and Recommendations:  64F POH CE/PCIOL OU, Non-clearing VH s/p PPV, PDR s/p PRP OU, s/p LAVELL OU (last injection 3 years ago; Follows with Dr. Laguna) PMH CHF, T2D admitted for vascular surgery management of leg pain and secretion, now complaining of 2-3 day history of blurriness and darkening of vision OU.    #Visual Disturbance OU  - Pt endorsing progressively worsening blurry vision for past 2-3 days. Denies Black out of vision, denies darkening of vision.  - BCVA 20/25 OU, no rAPD, color vision 6/12 OD 1/12 OS, IOP wnl, CVF full, EOMs full and without pain.   - Ant exam with DTBUT, no NVI OU  - DFE with PVD and ? signs of macular edema, also notable regressed NVD, no new NVE or NVD,  - DDx includes dry eye vs macular edema vs PVD vs retinal ischemia   - No acute ophthalmic intervention at this time  - Recommend artificial tears, one drop four times a day, to both eyes   - Pt to follow up with outpt retina clinic on dc, info below    SDW Dr. Coyle, ophthalmology attending.     Outpatient Follow-up: Patient should follow-up with his/her ophthalmologist or with [facility] Department of Ophthalmology within 1 week of after discharge at:    [facility]  [address] [place] [address]. [address]  [place] [address]  [phone]

## 2025-01-31 ENCOUNTER — APPOINTMENT (OUTPATIENT)
Dept: VASCULAR SURGERY | Facility: HOSPITAL | Age: 65
End: 2025-01-31

## 2025-01-31 LAB
ANION GAP SERPL CALC-SCNC: 18 MMOL/L — HIGH (ref 5–17)
APTT BLD: 78.5 SEC — HIGH (ref 24.5–35.6)
BUN SERPL-MCNC: 70 MG/DL — HIGH (ref 7–23)
CALCIUM SERPL-MCNC: 9 MG/DL — SIGNIFICANT CHANGE UP (ref 8.4–10.5)
CHLORIDE SERPL-SCNC: 95 MMOL/L — LOW (ref 96–108)
CO2 SERPL-SCNC: 22 MMOL/L — SIGNIFICANT CHANGE UP (ref 22–31)
CREAT SERPL-MCNC: 4.4 MG/DL — HIGH (ref 0.5–1.3)
EGFR: 11 ML/MIN/1.73M2 — LOW
EGFR: 11 ML/MIN/1.73M2 — LOW
FERRITIN SERPL-MCNC: 147 NG/ML — SIGNIFICANT CHANGE UP (ref 13–330)
FOLATE SERPL-MCNC: 19.5 NG/ML — SIGNIFICANT CHANGE UP
GLUCOSE BLDC GLUCOMTR-MCNC: 119 MG/DL — HIGH (ref 70–99)
GLUCOSE BLDC GLUCOMTR-MCNC: 154 MG/DL — HIGH (ref 70–99)
GLUCOSE BLDC GLUCOMTR-MCNC: 182 MG/DL — HIGH (ref 70–99)
GLUCOSE BLDC GLUCOMTR-MCNC: 96 MG/DL — SIGNIFICANT CHANGE UP (ref 70–99)
GLUCOSE SERPL-MCNC: 89 MG/DL — SIGNIFICANT CHANGE UP (ref 70–99)
HCT VFR BLD CALC: 26.1 % — LOW (ref 34.5–45)
HGB BLD-MCNC: 8.9 G/DL — LOW (ref 11.5–15.5)
IRON SATN MFR SERPL: 11 % — LOW (ref 14–50)
IRON SATN MFR SERPL: 29 UG/DL — LOW (ref 30–160)
MAGNESIUM SERPL-MCNC: 2.3 MG/DL — SIGNIFICANT CHANGE UP (ref 1.6–2.6)
MCHC RBC-ENTMCNC: 24.3 PG — LOW (ref 27–34)
MCHC RBC-ENTMCNC: 34.1 G/DL — SIGNIFICANT CHANGE UP (ref 32–36)
MCV RBC AUTO: 71.3 FL — LOW (ref 80–100)
NRBC # BLD: 0 /100 WBCS — SIGNIFICANT CHANGE UP (ref 0–0)
NRBC BLD-RTO: 0 /100 WBCS — SIGNIFICANT CHANGE UP (ref 0–0)
PHOSPHATE SERPL-MCNC: 6.1 MG/DL — HIGH (ref 2.5–4.5)
PLATELET # BLD AUTO: 408 K/UL — HIGH (ref 150–400)
POTASSIUM SERPL-MCNC: 4.3 MMOL/L — SIGNIFICANT CHANGE UP (ref 3.5–5.3)
POTASSIUM SERPL-SCNC: 4.3 MMOL/L — SIGNIFICANT CHANGE UP (ref 3.5–5.3)
RBC # BLD: 3.66 M/UL — LOW (ref 3.8–5.2)
RBC # FLD: 15.4 % — HIGH (ref 10.3–14.5)
SODIUM SERPL-SCNC: 135 MMOL/L — SIGNIFICANT CHANGE UP (ref 135–145)
TIBC SERPL-MCNC: 253 UG/DL — SIGNIFICANT CHANGE UP (ref 220–430)
UIBC SERPL-MCNC: 225 UG/DL — SIGNIFICANT CHANGE UP (ref 110–370)
VIT B12 SERPL-MCNC: 1026 PG/ML — SIGNIFICANT CHANGE UP (ref 232–1245)
WBC # BLD: 11.08 K/UL — HIGH (ref 3.8–10.5)
WBC # FLD AUTO: 11.08 K/UL — HIGH (ref 3.8–10.5)

## 2025-01-31 RX ORDER — DOBUTAMINE 250 MG/20ML
5 INJECTION INTRAVENOUS
Qty: 500 | Refills: 0 | Status: DISCONTINUED | OUTPATIENT
Start: 2025-01-31 | End: 2025-02-03

## 2025-01-31 RX ORDER — ASPIRIN 325 MG
81 TABLET ORAL DAILY
Refills: 0 | Status: DISCONTINUED | OUTPATIENT
Start: 2025-01-31 | End: 2025-03-17

## 2025-01-31 RX ADMIN — INSULIN LISPRO 1: 100 INJECTION, SOLUTION INTRAVENOUS; SUBCUTANEOUS at 12:27

## 2025-01-31 RX ADMIN — Medication 650 MILLIGRAM(S): at 15:46

## 2025-01-31 RX ADMIN — Medication 81 MILLIGRAM(S): at 12:27

## 2025-01-31 RX ADMIN — AMOXICILLIN AND CLAVULANATE POTASSIUM 1 TABLET(S): 500; 125 TABLET, FILM COATED ORAL at 05:27

## 2025-01-31 RX ADMIN — Medication 650 MILLIGRAM(S): at 05:27

## 2025-01-31 RX ADMIN — Medication 650 MILLIGRAM(S): at 14:46

## 2025-01-31 RX ADMIN — DOBUTAMINE 10.8 MICROGRAM(S)/KG/MIN: 250 INJECTION INTRAVENOUS at 08:55

## 2025-01-31 RX ADMIN — INSULIN GLARGINE-YFGN 16 UNIT(S): 100 INJECTION, SOLUTION SUBCUTANEOUS at 21:42

## 2025-01-31 RX ADMIN — OXYCODONE HYDROCHLORIDE 5 MILLIGRAM(S): 30 TABLET ORAL at 02:02

## 2025-01-31 RX ADMIN — Medication 650 MILLIGRAM(S): at 22:41

## 2025-01-31 RX ADMIN — AMOXICILLIN AND CLAVULANATE POTASSIUM 1 TABLET(S): 500; 125 TABLET, FILM COATED ORAL at 18:03

## 2025-01-31 RX ADMIN — OXYCODONE HYDROCHLORIDE 5 MILLIGRAM(S): 30 TABLET ORAL at 23:41

## 2025-01-31 RX ADMIN — ATORVASTATIN CALCIUM 40 MILLIGRAM(S): 80 TABLET, FILM COATED ORAL at 21:41

## 2025-01-31 RX ADMIN — ALBUMIN (HUMAN) 50 MILLILITER(S): 12.5 INJECTION, SOLUTION INTRAVENOUS at 12:49

## 2025-01-31 RX ADMIN — DOBUTAMINE 10.8 MICROGRAM(S)/KG/MIN: 250 INJECTION INTRAVENOUS at 21:42

## 2025-01-31 RX ADMIN — Medication 650 MILLIGRAM(S): at 21:41

## 2025-01-31 RX ADMIN — Medication 650 MILLIGRAM(S): at 06:00

## 2025-01-31 RX ADMIN — HEPARIN SODIUM 14 UNIT(S)/HR: 1000 INJECTION INTRAVENOUS; SUBCUTANEOUS at 19:35

## 2025-01-31 RX ADMIN — ALBUMIN (HUMAN) 50 MILLILITER(S): 12.5 INJECTION, SOLUTION INTRAVENOUS at 05:26

## 2025-01-31 RX ADMIN — HEPARIN SODIUM 14 UNIT(S)/HR: 1000 INJECTION INTRAVENOUS; SUBCUTANEOUS at 14:46

## 2025-01-31 RX ADMIN — OXYCODONE HYDROCHLORIDE 5 MILLIGRAM(S): 30 TABLET ORAL at 03:00

## 2025-01-31 RX ADMIN — ALBUMIN (HUMAN) 50 MILLILITER(S): 12.5 INJECTION, SOLUTION INTRAVENOUS at 18:03

## 2025-01-31 NOTE — DIETITIAN INITIAL EVALUATION ADULT - ADD RECOMMEND
1) recommend liberalize diet to consistent carbohydrate with evening snack, no concentrated phosphorus  2) RD to honor food preferences as offered/able; will add diet mighty shake 2x/day (200 kcal, 7 g Pro/4oz) to supplement diet  3) recommend MVI and vit C for wound healing  4) Monitor PO intake, weight, labs, skin, GI status, diet 
Quality 47: Advance Care Plan: Advance Care Planning discussed and documented; advance care plan or surrogate decision maker documented in the medical record.
Quality 111:Pneumonia Vaccination Status For Older Adults: Pneumococcal Vaccination Previously Received
Quality 110: Preventive Care And Screening: Influenza Immunization: Influenza Immunization Administered during Influenza season
Detail Level: Detailed
Quality 226: Preventive Care And Screening: Tobacco Use: Screening And Cessation Intervention: Patient screened for tobacco and is a smoker AND received Cessation Counseling

## 2025-01-31 NOTE — PROGRESS NOTE ADULT - ASSESSMENT
64F, hx of CHF (last TTE on 1/16/25 EF 30-35%, G2DD), DM, diabetic foot ulcer with a recent admission at [facility] for CHF exacerbation 1/14-1/17 p/w worsening R. leg pain and fluid secretion, was meeting sepsis criteria with podiatry having low suspicion for right cellulitis and admitted for continued management of HF exacerbation and needing ischemic eval.     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  -Left leg Arterial Duplex: Slightly tardus parvus waveform of the left popliteal artery is noted, for which underlying disease cannot be excluded. Posterior tibial artery waveform is nonpulsatile. Anterior tibial artery tardus parvus flow is noted.   Transferred to [facility] for CO2 angiogram as per vascular surgery    #  PAD Wound of lower extremity.   ·  Plan: Podiatry following, b/l serous bullae, no concerns for infection  Found to have RLE coolness  -Right Leg Arterial Duplex: Popliteal artery is occluded with negligible flow of right trifurcation arteries.  -Left leg Arterial Duplex: Slightly tardus parvus waveform of the left popliteal artery is noted, for which underlying disease cannot be excluded. Posterior tibial artery waveform is nonpulsatile. Anterior tibial artery tardus parvus flow is noted.  -Started on heparin gtt and dobutamine gtt -Will transfer to [facility] for CO2 angiogram as per vascular surgery as not candidate for CTA due to worsening SCr  -Keep compression dressing  -LE elevation above heart level at rest.  -Transferred to [facility] for CO2 angiogram   - Overall this patient is at   intermediate  risk (for cardiac death, nonfatal myocardial infarction, and nonfatal cardiac arrest perioperatively for this intermediate  risk procedure).   No cardiac contraindications for CO2 vangiogram  There  are  no further recommendation for risk stratifying imaging/stress testing prior to planned surgery      # HFrEF (congestive heart failure).   ·  Plan: Hx of HF, previous admission in January, on home Lasix 40 qD, Metoprolol Succ 25 qD. Not on Entresto due to insurance issues  Last TTE: LVSF moderately decreased w/ EF 30-35 %. Moderate G2DD. Mild MR  Stress test: small-sized, moderate defect(s) in the apical wall that is predominantly fixed suggestive of an infarction with minimal marcus-infarct ischemia  Ischemic cardiomyopathy  GDMT on hold 2/2 JAMEL  Continue Dobutamine at present will increase to 5 mcg/Kg/Min    # JAMEL  Creatinine Trend: 4.40<--4.27<--3.60<--, 2.36<--, 1.55<--, 1.25<--, 1.25<--, 1.17<--  Cardiorenal   Renal consult noted

## 2025-01-31 NOTE — DIETITIAN INITIAL EVALUATION ADULT - ENERGY INTAKE
Pt reports good appetite. Food preferences discussed - asking for yogurt. Concerned about low sugar 1/29. Current fingersticks WNL, ordered for Lantus and Lispro. Labs reviewed, hyperphosphatemia noted c/w JAMEL. Diabetic diet principles discussed, will need follow up/reinforcement as Pt only focused on angiogram at time of RD visit. Adequate (%)

## 2025-01-31 NOTE — PROGRESS NOTE ADULT - SUBJECTIVE AND OBJECTIVE BOX
Surgery Progress Note    SUBJECTIVE:  - Patient seen and examined at bedside   --------------------------------------------------------------------------------------------------  OBJECTIVE:   Physical Exam:  General: A&Ox3. NAD.  LLE: 2+ pitting edema of lower leg. Stable ulcerated toes. Sensation and ROM intact.   RLE: 2+ pitting edema of lower leg. Toes cooler compared to right. Sensation and ROM intact. Dressing changed with xeroform, kerlex and ACE wrap.   --------------------------------------------------------------------------------------------------  V/S:  Vital Signs Last 24 Hrs  T(C): 36.4 (31 Jan 2025 05:14), Max: 36.6 (30 Jan 2025 09:20)  T(F): 97.6 (31 Jan 2025 05:14), Max: 97.8 (30 Jan 2025 09:20)  HR: 85 (31 Jan 2025 05:14) (83 - 93)  BP: 114/68 (31 Jan 2025 05:14) (109/63 - 129/61)  BP(mean): --  RR: 18 (31 Jan 2025 05:14) (17 - 18)  SpO2: 95% (31 Jan 2025 05:14) (95% - 97%)    Parameters below as of 31 Jan 2025 05:14  Patient On (Oxygen Delivery Method): room air        --------------------------------------------------------------------------------------------------  I/Os:    30 Jan 2025 07:01  -  31 Jan 2025 07:00  --------------------------------------------------------  IN:    DOBUTamine: 64.8 mL    Heparin: 168 mL    IV PiggyBack: 100 mL    Oral Fluid: 120 mL  Total IN: 452.8 mL    OUT:    Voided (mL): 1100 mL  Total OUT: 1100 mL    Total NET: -647.2 mL        --------------------------------------------------------------------------------------------------  LABS:                        8.9    11.08 )-----------( 408      ( 31 Jan 2025 06:47 )             26.1     31 Jan 2025 06:47    135    |  95     |  70     ----------------------------<  89     4.3     |  22     |  4.40     Ca    9.0        31 Jan 2025 06:47  Phos  6.1       31 Jan 2025 06:47  Mg     2.3       31 Jan 2025 06:47      PTT - ( 31 Jan 2025 06:47 )  PTT:78.5 sec  CAPILLARY BLOOD GLUCOSE      POCT Blood Glucose.: 107 mg/dL (30 Jan 2025 21:27)  POCT Blood Glucose.: 130 mg/dL (30 Jan 2025 17:20)  POCT Blood Glucose.: 94 mg/dL (30 Jan 2025 12:14)  POCT Blood Glucose.: 96 mg/dL (30 Jan 2025 11:35)            Urinalysis Basic - ( 31 Jan 2025 06:47 )    Color: x / Appearance: x / SG: x / pH: x  Gluc: 89 mg/dL / Ketone: x  / Bili: x / Urobili: x   Blood: x / Protein: x / Nitrite: x   Leuk Esterase: x / RBC: x / WBC x   Sq Epi: x / Non Sq Epi: x / Bacteria: x      --------------------------------------------------------------------------------------------------  MEDICATIONS  (STANDING):  acetaminophen     Tablet .. 650 milliGRAM(s) Oral every 8 hours  albumin human 25% IVPB 50 milliLiter(s) IV Intermittent every 6 hours  amoxicillin  500 milliGRAM(s)/clavulanate 1 Tablet(s) Oral every 12 hours  atorvastatin 40 milliGRAM(s) Oral at bedtime  dextrose 5%. 1000 milliLiter(s) (50 mL/Hr) IV Continuous <Continuous>  dextrose 5%. 1000 milliLiter(s) (100 mL/Hr) IV Continuous <Continuous>  dextrose 50% Injectable 25 Gram(s) IV Push once  dextrose 50% Injectable 12.5 Gram(s) IV Push once  dextrose 50% Injectable 25 Gram(s) IV Push once  DOBUTamine Infusion 2.5 MICROgram(s)/kG/Min (5.41 mL/Hr) IV Continuous <Continuous>  glucagon  Injectable 1 milliGRAM(s) IntraMuscular once  heparin  Infusion 14 Unit(s)/Hr (14 mL/Hr) IV Continuous <Continuous>  insulin glargine Injectable (LANTUS) 16 Unit(s) SubCutaneous at bedtime  insulin lispro (ADMELOG) corrective regimen sliding scale   SubCutaneous three times a day before meals  metoprolol succinate ER 25 milliGRAM(s) Oral daily  polyethylene glycol 3350 17 Gram(s) Oral daily  senna 2 Tablet(s) Oral at bedtime    MEDICATIONS  (PRN):  dextrose Oral Gel 15 Gram(s) Oral once PRN Blood Glucose LESS THAN 70 milliGRAM(s)/deciliter  melatonin 3 milliGRAM(s) Oral at bedtime PRN Insomnia  ondansetron Injectable 4 milliGRAM(s) IV Push every 6 hours PRN Nausea and/or Vomiting  oxyCODONE    IR 5 milliGRAM(s) Oral every 6 hours PRN Severe Pain (7 - 10)    --------------------------------------------------------------------------------------------------

## 2025-01-31 NOTE — PROGRESS NOTE ADULT - ASSESSMENT
64y Female with history of CHF presents as a transfer for LE CO2 angiogram. Nephrology consulted for elevated Scr.    1) JAMEL: likely due to ATN in setting of infection/hypotension. Scr appears to be plateauing. UA relatively bland. FeUrea low consistent with low flow state. Renal US unremarkable. Continue with  (plan to increase rate today) and IV albumin. Avoid nephrotoxins.    2) HTN: BP low normal. Metoprolol as per cardiology. Holding ARNI.    3) LE edema: Not secondary to nephrotic syndrome given subnephrotic range proteinuria. Patient with improving UO on  gtt and IV albumin. Will likely give concurrent IV lasix on  pending improvement in renal function. TTE with moderately decreased LVSF. Monitor UO.    4) Hyperphosphatemia: Continue with renal diet. Defer binders.      [facility]  Raji Waterman M.D.  Mars Feliz D.O.  Kelley Parks M.D.  MD Nancy Kulkarni, MSN, ANP-C    Telephone: [phone]  Facsimile: [phone] [place] [address], [id number]  [place] [address]

## 2025-01-31 NOTE — DIETITIAN INITIAL EVALUATION ADULT - NSFNSADHERENCEPTAFT_GEN_A_CORE
Pt reports trying to follow a diabetic diet PTA. Has an insulin pump, ordered for lantus and admelog. HbA1c 11.6% (1/24) indicating poor glycemic control.

## 2025-01-31 NOTE — DIETITIAN INITIAL EVALUATION ADULT - EDUCATION DIETARY MODIFICATIONS
education provided on what foods contain carbohydrates, importance of pairing carbohydrates with protein for glycemic control; choosing whole grains vs refined carbohydrates; limiting refined sugars, portion sizes; adequate protein calorie intake/(1) partially meets; needs review/practice/verbalization

## 2025-01-31 NOTE — DIETITIAN INITIAL EVALUATION ADULT - PERTINENT MEDS FT
MEDICATIONS  (STANDING):  acetaminophen     Tablet .. 650 milliGRAM(s) Oral every 8 hours  albumin human 25% IVPB 50 milliLiter(s) IV Intermittent every 6 hours  amoxicillin  500 milliGRAM(s)/clavulanate 1 Tablet(s) Oral every 12 hours  atorvastatin 40 milliGRAM(s) Oral at bedtime  dextrose 5%. 1000 milliLiter(s) (50 mL/Hr) IV Continuous <Continuous>  dextrose 5%. 1000 milliLiter(s) (100 mL/Hr) IV Continuous <Continuous>  dextrose 50% Injectable 25 Gram(s) IV Push once  dextrose 50% Injectable 12.5 Gram(s) IV Push once  dextrose 50% Injectable 25 Gram(s) IV Push once  DOBUTamine Infusion 2.5 MICROgram(s)/kG/Min (5.41 mL/Hr) IV Continuous <Continuous>  glucagon  Injectable 1 milliGRAM(s) IntraMuscular once  heparin  Infusion 14 Unit(s)/Hr (14 mL/Hr) IV Continuous <Continuous>  insulin glargine Injectable (LANTUS) 16 Unit(s) SubCutaneous at bedtime  insulin lispro (ADMELOG) corrective regimen sliding scale   SubCutaneous three times a day before meals  metoprolol succinate ER 25 milliGRAM(s) Oral daily  polyethylene glycol 3350 17 Gram(s) Oral daily  senna 2 Tablet(s) Oral at bedtime    MEDICATIONS  (PRN):  dextrose Oral Gel 15 Gram(s) Oral once PRN Blood Glucose LESS THAN 70 milliGRAM(s)/deciliter  melatonin 3 milliGRAM(s) Oral at bedtime PRN Insomnia  ondansetron Injectable 4 milliGRAM(s) IV Push every 6 hours PRN Nausea and/or Vomiting  oxyCODONE    IR 5 milliGRAM(s) Oral every 6 hours PRN Severe Pain (7 - 10)

## 2025-01-31 NOTE — PROGRESS NOTE ADULT - SUBJECTIVE AND OBJECTIVE BOX
MR#[id number]  PATIENT NAME:COLE ALBA    DATE OF SERVICE: 25 @ 07:48  Patient was seen and examined by Yordy Gilmore MD on    25 @ 07:48 .  Interim events noted.Consultant notes ,Labs,Telemetry reviewed by me       HOSPITAL COURSE: HPI:  64F, hx of CHF (last TTE on 25 EF 30-35%, G2DD), DM, diabetic foot ulcer with a recent admission at [facility] for CHF exacerbation presented as a transfer for worsening R. leg pain and fluid secretion, On non-invasive imaging demonstrating severe depletion of RLE blood flow beyond the popliteal vessel at knee and similar findings in L. Was transferred to [facility] for CO2 angiogram with Dr. Baltazar. (2025 20:05)      Transferred from [facility]-TTE on previous admission: LVSF moderately decreased w/EF 30-35%. Moderate G2DD. Mild MR. Ischemic evaluation was recommended for which patient undergone stress test which revealed a small-sized moderate defect in the apical wall that is predominantly fixed suggestive of an infarction with minimal marcus-infarct ischemia. Patient was continued on their home Metoprolol Succ 25 ER and restarted Entresto / BIDVascular consulted for continued leg pain and recommended imaging: Right Leg Arterial Duplex: Popliteal artery is occluded with negligible flow of right trifurcation arteries. Left leg Arterial Duplex: Slightly tardus parvus waveform of the left popliteal artery is noted, for which underlying disease cannot be excluded. Posterior tibial artery waveform is nonpulsatile. Anterior tibial artery tardus parvus flow is noted. Patient started on Heparin and Dobutamine drip with transfer to [facility] for further management.      INTERIM EVENTS:Patient seen at bedside ,interim events noted.  Awake alert seen by Opthal-for altered vision,no chest pain still c/o Right LE pain  Sitting in chair remains on  2.5mcg        PMH -reviewed admission note, no change since admission  HEART FAILURE: Acute[x ]Chronic[x ] Systolic[x ] Diastolic[ ] Combined Systolic and Diastolic[ ]  CAD[ ] CABG[ ] PCI[ ]  DEVICES[ ] PPM[ ] ICD[ ] ILR[ ]  ATRIAL FIBRILLATION[ ] Paroxysmal[ ] Permanent[ ] CHADS2-[  ]  JAMEL[x ] CKD1[ ] CKD2[ ] CKD3[ ] CKD4[ ] ESRD[ ]  COPD[ ] HTN[x ]   DM[x ] Type1[ ] Type 2[x ]   CVA[ ] Paresis[ ]    AMBULATION: Assisted[ ] Cane/walker[x ] Independent[ ]  MEDICATIONS  (STANDING):  acetaminophen     Tablet .. 650 milliGRAM(s) Oral every 8 hours  albumin human 25% IVPB 50 milliLiter(s) IV Intermittent every 6 hours  amoxicillin  500 milliGRAM(s)/clavulanate 1 Tablet(s) Oral every 12 hours  atorvastatin 40 milliGRAM(s) Oral at bedtime  DOBUTamine Infusion 2.5 MICROgram(s)/kG/Min (5.41 mL/Hr) IV Continuous <Continuous>  glucagon  Injectable 1 milliGRAM(s) IntraMuscular once  heparin  Infusion 14 Unit(s)/Hr (14 mL/Hr) IV Continuous <Continuous>  insulin glargine Injectable (LANTUS) 16 Unit(s) SubCutaneous at bedtime  insulin lispro (ADMELOG) corrective regimen sliding scale   SubCutaneous three times a day before meals  metoprolol succinate ER 25 milliGRAM(s) Oral daily  polyethylene glycol 3350 17 Gram(s) Oral daily  senna 2 Tablet(s) Oral at bedtime    MEDICATIONS  (PRN):  dextrose Oral Gel 15 Gram(s) Oral once PRN Blood Glucose LESS THAN 70 milliGRAM(s)/deciliter  melatonin 3 milliGRAM(s) Oral at bedtime PRN Insomnia  ondansetron Injectable 4 milliGRAM(s) IV Push every 6 hours PRN Nausea and/or Vomiting  oxyCODONE    IR 5 milliGRAM(s) Oral every 6 hours PRN Severe Pain (7 - 10)            REVIEW OF SYSTEMS:  Constitutional: [ ] fever, [ ]weight loss,  [x ]fatigue [ ]weight gain  Eyes: [ ] visual changes  Respiratory: [ x]shortness of breath;  [ ] cough, [ ]wheezing, [ ]chills, [ ]hemoptysis  Cardiovascular: [ ] chest pain, [ ]palpitations, [ ]dizziness,  [ ]leg swelling[ ]orthopnea[ ]PND  Gastrointestinal: [ ] abdominal pain, [ ]nausea, [ ]vomiting,  [ ]diarrhea [ ]Constipation [ ]Melena  Genitourinary: [ ] dysuria, [ ] hematuria [ ][place]  Neurologic: [ ] headaches [ ] tremors[ ]weakness [ ]Paralysis Right[ ] Left[ ]  Skin: [ ] itching, [ ]burning, [ ] rashes  Endocrine: [ ] heat or cold intolerance  Musculoskeletal: [ ] joint pain or swelling; [ ] muscle, back, or extremity pain  Psychiatric: [ ] depression, [ ]anxiety, [ ]mood swings, or [ ]difficulty sleeping  Hematologic: [ ] easy bruising, [ ] bleeding gums    [ ] All remaining systems negative except as per above.   [ ]Unable to obtain.  [x] No change in ROS since admission      Vital Signs Last 24 Hrs  T(C): 36.4 (2025 05:14), Max: 36.6 (2025 09:20)  T(F): 97.6 (2025 05:14), Max: 97.8 (2025 09:20)  HR: 85 (2025 05:14) (83 - 93)  BP: 114/68 (2025 05:14) (109/63 - 129/61)  RR: 18 (2025 05:14) (17 - 18)  SpO2: 95% (2025 05:14) (95% - 97%)    Parameters below as of 2025 05:14  Patient On (Oxygen Delivery Method): room air      I&O's Summary    2025 07:01  -  2025 07:00  --------------------------------------------------------  IN: 452.8 mL / OUT: 1100 mL / NET: -647.2 mL        PHYSICAL EXAM:  General: No acute distress BMI-24  HEENT: EOMI, PERRL  Neck: Supple, [ ] JVD  Lungs: Equal air entry bilaterally; [ ] rales [ ] wheezing [ ] rhonchi  Heart: Regular rate and rhythm; [x ] murmur   2/6 [ x] systolic [ ] diastolic [ ] radiation[ ] rubs [ ]  gallops  Abdomen: Nontender, bowel sounds present  Extremities: No clubbing, cyanosis, [ ] edema [ ] [x ]: several chronic wounds noted over the b/l lower extremities, worse on R than left   Vasc: B/l LE somewhat cold to the touch beyond the knee, no appreciable doppler signals in b/l LE   Nervous system:  Alert & Oriented X3, no focal deficits  Psychiatric: Normal affect  Skin: No rashes or lesions    LABS:      135  |  95[L]  |  70[H]  ----------------------------<  89  4.3   |  22  |  4.40[H]    Ca    9.0      2025 06:47  Phos  6.1       Mg     2.3           Creatinine Trend: 4.40<--, 4.27<--, 3.60<--, 2.36<--, 1.55<--, 1.25<--                        8.9    11.08 )-----------( 408      ( 2025 06:47 )             26.1     PTT - ( 2025 06:47 )  PTT:78.5 sec          TTE W or WO Ultrasound Enhancing Agent (25 @ 12:45) >  CONCLUSIONS:      1. Left ventricular systolic function is moderately decreased with an ejection fraction visually estimated at 30 to 35 %.   2. There is moderate (grade 2) left ventricular diastolic dysfunction.   3. Mild mitral regurgitation.   4. Trace tricuspid regurgitation.   5. Trace pulmonic regurgitation.   6. Trace pericardial effusion.   7. Right pleural effusion noted.   8. No prior echocardiogram is available for comparison.  US Physiol Extremity Lower 3+ Level, BI (25 @ 14:28) >  IMPRESSION:    Limited CHARMAINE-PVR evaluation due to noncompressible vasculature. Subsequent  arterial duplex ultrasound was performed due to abnormal PVR findings,  particularly of the right leg.    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 Correlate for right leg ischemia.    Left leg Arterial Duplex: Slightly tardus parvus waveform of the left  popliteal artery is noted, for which underlying disease cannot be  excluded. Posterior tibial artery waveform is nonpulsatile. Anterior  tibial artery tardus parvus flow is noted. Correlate for left leg  ischemia.    Recommend vascular surgery evaluation and runoff CTA for further  evaluation.    CT Chest No Cont (25 @ 14:08) >  IMPRESSION: Small bilateral pleural effusions and small pericardial   effusion.        Nuclear Stress Test-Pharmacologic.. (25 @ 10:31) >  Conclusions:   1. Myocardial Perfusion: Abnormal.   2. Qualitative Perfusion:      - small-sized, moderate defect(s) in the apical wall that is predominantly fixed suggestive of an infarction with minimal marcus-infarct ischemia.   3. The post stress left ventricular EF is 25 %. The stress end diastolic volume is 118 ml.   4. Results D/Wcardiologist Dr. Gilmore at 18:31       12 Lead ECG (25 @ 10:23) >  Sinus tachycardia  Cannot rule out Anterior infarct , age undetermined  ST & T wave abnormality, consider inferolateral ischemia  Abnormal ECG

## 2025-01-31 NOTE — PROGRESS NOTE ADULT - ASSESSMENT
Patient is a 64 year old Female, with a PMHx of of CHF (last TTE on 01/16/25 with EF of 30-35%, G2DD), DM, diabetic foot ulcer with a recent admission at [facility] for CHF exacerbation who presents to [facility] as a transfer for worsening right leg pain and fluid secretion. As per documentation, patient was reported to have severe depletion of RLE blood flow beyond the popliteal vessel at knee and similar findings in the left. Patient was transferred to [facility] for CO2 angiogram with Dr. Baltazar. Of note, patient with reported visual disturbance for which patient was seen and evaluated by opthalmology Internal Medicine has been consulted on Ms. Kirby's care for medical management.       CHF - HFrEF  - 1/16/2025 TTE with REDUCED EF of 30-35%, Grade II LVDD, Mild MR, Trace TR/ [place], Trace pericardial effusion  - NST - Abnormal, w/ small-sized, moderate defect(s) in apical wall that is predominantly fixed suggestive of an infarction with minimal marcus-infarct ischemia, post stress LV EF 25 %  - CT Chest w/ Small B/L pleural effusions and small pericardial effusion.  - GDMT as per Cardio - currently on Toprol XL 25 PO Qd (In view of JAMEL further medications currently held)   - On Dobutamine gtt    - Monitor I and O; Maintain O > I; Check daily weights   - Monitor volume status; Monitor electrolytes   - Monitor on tele   - Cardio eval appreciated; F/u recs    Diabetic Foot Ulcer   - RLE Arterial Duplex w/ Popliteal artery occlusion   - LLE Arterial Duplex w/ underlying disease cannot be excluded   - On Lipitor  - On Hep gtt; Monitor Ptt and adjust as per normogram; Monitor H/H and for gross bleeding  - Trend inflammatory markers   - Transferred to [facility] for CO2 angiogram with Vascular - Needs RENAL Clearance prior. Patient is deemed to be a high risk candidate. Cardiac clearance as per Cardiology.  - Vascular care appreciated; F/u recs    Leukocytosis   - Likely 2/2 LE   - 1/23 BCx2 negative; UCx w/ probable contamination   - On Amoxicillin from OSH   - Trend CBC, temp curve, VS and adjust as tolerated     JAMEL, Hyponatremia, Metabolic Acidosis   - As per OSH documentation, JAMEL was thought to be 2/2 to Unasyn/ Bumex / Entresto use and hypotension   - Check Bladder scan, noted   - Check Renal US noted   - Cr up-trending; Monitor for LILIAN   - Avoid nephrotoxic agents  - Monitor Cr and daily BMP; Avoid overcorrection of Na > 6-8 mEq in 24 hours   - Renal eval appreciated; F/u recs   - Albumin per renal     Visual Disturbance  - Consider CT Head   - Opthalmology eval appreciated; F/u recs    LE Edema  - W/ notable LE Edema on exam  - Diuresis on hold as per renal in view of Cr  - LE elevation; Compression  - Monitor     Pericardial effusion   - TTE w/ trace pericardial effusion   - CT Chest w/ small pericardial effusion   - Denies chest pain, palpitations, chest tightness or discomfort, shortness of breath or dyspnea   - Repeat TTE in 1 month for further evaluation   - Monitor on tele  - Cardio follow up     Pleural effusion   - CT Chest w/ small B/L pleural effusions  - On RA; No respiratory complaints at present  - Monitor O2 saturation; Supplement to maintain > 90%   - Diuresis on hold in view of Cr    Anemia   - Check anemia labs - Iron, B12, Folate, Ferritin   - On Hep gtt as above; Monitor   - Transfuse for Hgb < 7.5  - Maintain active T/S; Monitor H/H; Large bore IV access    Diabetes Mellitus   - A1C 11.6   - C/w Sliding scale, Lantus (If NPO, reduce Lantus)   - Diabetic, DASH Diet  - Monitor glucose levels; Adjust insulin regimen as tolerated   - ENDO eval recommended   - Patient will require outpatient Endo, Renal, Optho and Podiatry follow ups upon DC    HLD  - Check Lipid panel   - On Lipitor 40     HTN   - On Toprol XL as above   - On Dobutamine gtt at present   - Monitor BP, VS and adjust as tolerated; Reported to be hypotensive at OSH as per documentation     PPX    Patient seen and examined by me. patient care and plan discussed and reviewed with PA. Plan as outlined above edited by me to reflect our discussion. Advanced care planning/advanced directives discussed with patient/family. DNR status including forceful chest compressions to attempt to restart the heart, ventilator support/artificial breathing, electric shock, artificial nutrition, health care proxy, Molst form all discussed with pt. Sixty seven minutes of total time dedicated to this patient visit today including preparing to see the patient (eg. review of tests), obtaining and/or reviewing separately obtained history, obtaining/reviewing vitals, performing a medically appropriate examination and/or evaluation, counseling and educating the patient/family/caregiver, reviewing previous notes and test results, and procedures, communicating with other health professionals (when not separately reported), and documenting clinical information in the electronic health record.

## 2025-01-31 NOTE — DIETITIAN INITIAL EVALUATION ADULT - NSFNSPHYEXAMSKINFT_GEN_A_CORE
no pressure injuries per RN flowsheets; Pt with multiple wounds: left 1st toe, b/l shins, and right dorsal foot

## 2025-01-31 NOTE — DIETITIAN INITIAL EVALUATION ADULT - PERTINENT LABORATORY DATA
01-31    135  |  95[L]  |  70[H]  ----------------------------<  89  4.3   |  22  |  4.40[H]    Ca    9.0      31 Jan 2025 06:47  Phos  6.1     01-31  Mg     2.3     01-31    POCT Blood Glucose.: 107 mg/dL (01-30-25 @ 21:27)  A1C with Estimated Average Glucose Result: 11.6 % (01-24-25 @ 05:40)  A1C with Estimated Average Glucose Result: >15.5 % (12-13-24 @ 06:49)

## 2025-01-31 NOTE — PROGRESS NOTE ADULT - ASSESSMENT
64y.o. Female with PAD, CLTI affecting b/l LE, CHF, chronic b/l LE wound R worsen then the L, with R foot blistering and ulceration was transfer to [facility] for CO2 angiogram. Currently patient Cr is uptrending, not medically optimized for the procedure.     -pt ultrasound reviewed  -severe bilateral below knee disease  -con't ASA/statin  -f/u medicine and transfer to Medicine for further optimization    vascular [id number]

## 2025-01-31 NOTE — DIETITIAN INITIAL EVALUATION ADULT - REASON FOR ADMISSION
Per chart, Pt is a 63 y/o F with PMH: "CHF (last TTE on 01/16/25 with EF of 30-35%, G2DD), DM, diabetic foot ulcer with a recent admission at [facility] for CHF exacerbation who presents to [facility] as a transfer for worsening right leg pain and fluid secretion." Pending CO2 angiogram.

## 2025-01-31 NOTE — DIETITIAN INITIAL EVALUATION ADULT - OTHER INFO
dosing wt: 72.1 kg (1/29) *likely reported not actual  wt hx per Kimberlyn HIE: 78.3 kg (1/23), 80.7 kg (1/14), 71.2 kg (12/12/24) *anticipate wt loss from subsiding edema  reported UBW: 72.7 kg; endorses recent wt gain 2/2 IVF at OSH and edema

## 2025-01-31 NOTE — PROGRESS NOTE ADULT - SUBJECTIVE AND OBJECTIVE BOX
[facility] NEPHROLOGY- PROGRESS NOTE    64y Female with history of CHF presents as a transfer for LE CO2 angiogram. Nephrology consulted for elevated Scr.    REVIEW OF SYSTEMS:  Gen: no fevers  Cards: no chest pain  Resp: + dyspnea with exertion  GI: no nausea or vomiting or diarrhea  Vascular: + LE edema    No Known Allergies      Hospital Medications: Medications reviewed    VITALS:  T(F): 97.4 (01-31-25 @ 09:10), Max: 97.7 (01-30-25 @ 13:56)  HR: 92 (01-31-25 @ 09:10)  BP: 119/69 (01-31-25 @ 09:10)  RR: 18 (01-31-25 @ 09:10)  SpO2: 94% (01-31-25 @ 09:10)  Wt(kg): --  Height (cm): 162.6 (01-29 @ 21:11), 165.1 (01-23 @ 09:50), 165.1 (01-14 @ 11:11)  Weight (kg): 72.121 (01-29 @ 21:11), 78.3 (01-23 @ 09:50), 80.7 (01-14 @ 11:11)  BMI (kg/m2): 27.3 (01-29 @ 21:11), 26.5 (01-29 @ 21:11), 28.7 (01-23 @ 09:50), 29.6 (01-14 @ 11:11)  BSA (m2): 1.77 (01-29 @ 21:11), 1.79 (01-29 @ 21:11), 1.86 (01-23 @ 09:50), 1.88 (01-14 @ 11:11)    01-30 @ 07:01  -  01-31 @ 07:00  --------------------------------------------------------  IN: 452.8 mL / OUT: 1100 mL / NET: -647.2 mL    01-31 @ 07:01  -  01-31 @ 11:12  --------------------------------------------------------  IN: 160 mL / OUT: 700 mL / NET: -540 mL        PHYSICAL EXAM:    Gen: NAD, calm  Cards: RRR, +S1/S2, no M/G/R  Resp: bibasilar rales  GI: soft, NT/ND, NABS  Vascular: 2+ LE edema B/L    LABS:  01-31    135  |  95[L]  |  70[H]  ----------------------------<  89  4.3   |  22  |  4.40[H]    Ca    9.0      31 Jan 2025 06:47  Phos  6.1     01-31  Mg     2.3     01-31      Creatinine Trend: 4.40 <--, 4.27 <--, 3.60 <--, 2.36 <--, 1.55 <--, 1.25 <--, 1.25 <--                        8.9    11.08 )-----------( 408      ( 31 Jan 2025 06:47 )             26.1     Urine Studies:  Urinalysis Basic - ( 31 Jan 2025 06:47 )    Color:  / Appearance:  / SG:  / pH:   Gluc: 89 mg/dL / Ketone:   / Bili:  / Urobili:    Blood:  / Protein:  / Nitrite:    Leuk Esterase:  / RBC:  / WBC    Sq Epi:  / Non Sq Epi:  / Bacteria:       Creatinine, Random Urine: 33 mg/dL (01-30 @ 20:41)  Protein/Creatinine Ratio Calculation: 1.1 Ratio (01-30 @ 20:41)  Creatinine, Random Urine: 102 mg/dL (01-29 @ 11:53)  Sodium, Random Urine: 10 mmol/L (01-27 @ 13:10)  Potassium, Random Urine: 43 mmol/L (01-27 @ 13:10)  Creatinine, Random Urine: 122 mg/dL (01-27 @ 13:10)  Protein/Creatinine Ratio Calculation: 0.2 Ratio (01-27 @ 13:10)  Calcium, Random Urine: 1.0 mg/dL (01-27 @ 13:10)      RADIOLOGY & ADDITIONAL STUDIES:    < from: CT Head No Cont (01.30.25 @ 15:57) >    Impression: Old infarct as described above.    --- End of Report ---    < end of copied text >      < from: US Kidney and Bladder (01.30.25 @ 12:47) >  FINDINGS:  Right kidney: 11.7 cm. No renal mass, hydronephrosis or calculi.    Left kidney: 10.9 cm. No renal mass, hydronephrosis or calculi.    Urinary bladder: Underdistended.    Miscellaneous: Bilateral pleural effusion.    IMPRESSION:  No hydronephrosis.        --- End of Report ---    < end of copied text >

## 2025-01-31 NOTE — DIETITIAN INITIAL EVALUATION ADULT - ORAL INTAKE PTA/DIET HISTORY
Chart reviewed, events noted. Pt reports good appetite and no issues chewing/swallowing. NKFA. Follows a Halal diet.

## 2025-01-31 NOTE — PROGRESS NOTE ADULT - SUBJECTIVE AND OBJECTIVE BOX
Name of Patient : TOMASZ ALBA  MRN: [id number]  Date of visit: 01-31-25      Subjective: Patient seen and examined. No new events except as noted.   doing better       REVIEW OF SYSTEMS:    CONSTITUTIONAL: No weakness, fevers or chills  EYES/ENT: No visual changes;  No vertigo or throat pain   NECK: No pain or stiffness  RESPIRATORY: No cough, wheezing, hemoptysis; No shortness of breath  CARDIOVASCULAR: No chest pain or palpitations  GASTROINTESTINAL: No abdominal or epigastric pain. No nausea, vomiting, or hematemesis; No diarrhea or constipation. No melena or hematochezia.  GENITOURINARY: No dysuria, frequency or hematuria  NEUROLOGICAL: No numbness or weakness  SKIN: No itching, burning, rashes, or lesions   All other review of systems is negative unless indicated above.    MEDICATIONS:  MEDICATIONS  (STANDING):  acetaminophen     Tablet .. 650 milliGRAM(s) Oral every 8 hours  albumin human 25% IVPB 50 milliLiter(s) IV Intermittent every 6 hours  amoxicillin  500 milliGRAM(s)/clavulanate 1 Tablet(s) Oral every 12 hours  aspirin enteric coated 81 milliGRAM(s) Oral daily  atorvastatin 40 milliGRAM(s) Oral at bedtime  dextrose 5%. 1000 milliLiter(s) (50 mL/Hr) IV Continuous <Continuous>  dextrose 5%. 1000 milliLiter(s) (100 mL/Hr) IV Continuous <Continuous>  dextrose 50% Injectable 25 Gram(s) IV Push once  dextrose 50% Injectable 12.5 Gram(s) IV Push once  dextrose 50% Injectable 25 Gram(s) IV Push once  DOBUTamine Infusion 5 MICROgram(s)/kG/Min (10.8 mL/Hr) IV Continuous <Continuous>  glucagon  Injectable 1 milliGRAM(s) IntraMuscular once  heparin  Infusion 14 Unit(s)/Hr (14 mL/Hr) IV Continuous <Continuous>  insulin glargine Injectable (LANTUS) 16 Unit(s) SubCutaneous at bedtime  insulin lispro (ADMELOG) corrective regimen sliding scale   SubCutaneous three times a day before meals  metoprolol succinate ER 25 milliGRAM(s) Oral daily  polyethylene glycol 3350 17 Gram(s) Oral daily  senna 2 Tablet(s) Oral at bedtime      PHYSICAL EXAM:  T(C): 36.3 (01-31-25 @ 21:08), Max: 36.6 (01-31-25 @ 17:00)  HR: 109 (01-31-25 @ 21:08) (85 - 109)  BP: 116/77 (01-31-25 @ 21:08) (104/60 - 129/61)  RR: 18 (01-31-25 @ 21:08) (17 - 18)  SpO2: 95% (01-31-25 @ 21:08) (94% - 96%)  Wt(kg): --  I&O's Summary    30 Jan 2025 07:01  -  31 Jan 2025 07:00  --------------------------------------------------------  IN: 452.8 mL / OUT: 1100 mL / NET: -647.2 mL    31 Jan 2025 07:01  -  31 Jan 2025 23:24  --------------------------------------------------------  IN: 528 mL / OUT: 1050 mL / NET: -522 mL          Appearance: Normal	  HEENT:  PERRLA   Lymphatic: No lymphadenopathy   Cardiovascular: Normal S1 S2, no JVD  Respiratory: normal effort , clear  Gastrointestinal:  Soft, Non-tender  Skin: No rashes,  warm to touch  Psychiatry:  Mood & affect appropriate  Musculuskeletal: No edema    recent labs, Imaging and EKGs personally reviewed   CODE status discussed with the patient in detail    01-30-25 @ 07:01  -  01-31-25 @ 07:00  --------------------------------------------------------  IN: 452.8 mL / OUT: 1100 mL / NET: -647.2 mL    01-31-25 @ 07:01  -  01-31-25 @ 23:24  --------------------------------------------------------  IN: 528 mL / OUT: 1050 mL / NET: -522 mL                          8.9    11.08 )-----------( 408      ( 31 Jan 2025 06:47 )             26.1               01-31    135  |  95[L]  |  70[H]  ----------------------------<  89  4.3   |  22  |  4.40[H]    Ca    9.0      31 Jan 2025 06:47  Phos  6.1     01-31  Mg     2.3     01-31      PTT - ( 31 Jan 2025 06:47 )  PTT:78.5 sec                   Urinalysis Basic - ( 31 Jan 2025 06:47 )    Color: x / Appearance: x / SG: x / pH: x  Gluc: 89 mg/dL / Ketone: x  / Bili: x / Urobili: x   Blood: x / Protein: x / Nitrite: x   Leuk Esterase: x / RBC: x / WBC x   Sq Epi: x / Non Sq Epi: x / Bacteria: x

## 2025-02-01 LAB
ANION GAP SERPL CALC-SCNC: 19 MMOL/L — HIGH (ref 5–17)
APTT BLD: 106 SEC — HIGH (ref 24.5–35.6)
APTT BLD: 49.7 SEC — HIGH (ref 24.5–35.6)
APTT BLD: 95.8 SEC — HIGH (ref 24.5–35.6)
BUN SERPL-MCNC: 62 MG/DL — HIGH (ref 7–23)
CALCIUM SERPL-MCNC: 9 MG/DL — SIGNIFICANT CHANGE UP (ref 8.4–10.5)
CHLORIDE SERPL-SCNC: 96 MMOL/L — SIGNIFICANT CHANGE UP (ref 96–108)
CO2 SERPL-SCNC: 19 MMOL/L — LOW (ref 22–31)
CREAT SERPL-MCNC: 3.9 MG/DL — HIGH (ref 0.5–1.3)
EGFR: 12 ML/MIN/1.73M2 — LOW
EGFR: 12 ML/MIN/1.73M2 — LOW
GLUCOSE BLDC GLUCOMTR-MCNC: 165 MG/DL — HIGH (ref 70–99)
GLUCOSE BLDC GLUCOMTR-MCNC: 170 MG/DL — HIGH (ref 70–99)
GLUCOSE BLDC GLUCOMTR-MCNC: 182 MG/DL — HIGH (ref 70–99)
GLUCOSE BLDC GLUCOMTR-MCNC: 199 MG/DL — HIGH (ref 70–99)
GLUCOSE SERPL-MCNC: 160 MG/DL — HIGH (ref 70–99)
HCT VFR BLD CALC: 26 % — LOW (ref 34.5–45)
HGB BLD-MCNC: 9 G/DL — LOW (ref 11.5–15.5)
MAGNESIUM SERPL-MCNC: 2.3 MG/DL — SIGNIFICANT CHANGE UP (ref 1.6–2.6)
MCHC RBC-ENTMCNC: 24.8 PG — LOW (ref 27–34)
MCHC RBC-ENTMCNC: 34.6 G/DL — SIGNIFICANT CHANGE UP (ref 32–36)
MCV RBC AUTO: 71.6 FL — LOW (ref 80–100)
NRBC # BLD: 0 /100 WBCS — SIGNIFICANT CHANGE UP (ref 0–0)
NRBC BLD-RTO: 0 /100 WBCS — SIGNIFICANT CHANGE UP (ref 0–0)
PHOSPHATE SERPL-MCNC: 5.2 MG/DL — HIGH (ref 2.5–4.5)
PLATELET # BLD AUTO: 375 K/UL — SIGNIFICANT CHANGE UP (ref 150–400)
POTASSIUM SERPL-MCNC: 4.4 MMOL/L — SIGNIFICANT CHANGE UP (ref 3.5–5.3)
POTASSIUM SERPL-SCNC: 4.4 MMOL/L — SIGNIFICANT CHANGE UP (ref 3.5–5.3)
RBC # BLD: 3.63 M/UL — LOW (ref 3.8–5.2)
RBC # FLD: 15.5 % — HIGH (ref 10.3–14.5)
SODIUM SERPL-SCNC: 134 MMOL/L — LOW (ref 135–145)
WBC # BLD: 11.5 K/UL — HIGH (ref 3.8–10.5)
WBC # FLD AUTO: 11.5 K/UL — HIGH (ref 3.8–10.5)

## 2025-02-01 RX ORDER — AMPICILLIN SODIUM AND SULBACTAM SODIUM 1; .5 G/1; G/1
3 INJECTION, POWDER, FOR SOLUTION INTRAMUSCULAR; INTRAVENOUS DAILY
Refills: 0 | Status: DISCONTINUED | OUTPATIENT
Start: 2025-02-02 | End: 2025-02-06

## 2025-02-01 RX ORDER — AMPICILLIN SODIUM AND SULBACTAM SODIUM 1; .5 G/1; G/1
INJECTION, POWDER, FOR SOLUTION INTRAMUSCULAR; INTRAVENOUS
Refills: 0 | Status: DISCONTINUED | OUTPATIENT
Start: 2025-02-01 | End: 2025-02-06

## 2025-02-01 RX ORDER — FUROSEMIDE 10 MG/ML
80 INJECTION INTRAMUSCULAR; INTRAVENOUS ONCE
Refills: 0 | Status: COMPLETED | OUTPATIENT
Start: 2025-02-01 | End: 2025-02-01

## 2025-02-01 RX ORDER — INSULIN LISPRO 100 U/ML
INJECTION, SOLUTION INTRAVENOUS; SUBCUTANEOUS AT BEDTIME
Refills: 0 | Status: DISCONTINUED | OUTPATIENT
Start: 2025-02-01 | End: 2025-03-17

## 2025-02-01 RX ORDER — ALBUMIN (HUMAN) 12.5 G/50ML
50 INJECTION, SOLUTION INTRAVENOUS EVERY 6 HOURS
Refills: 0 | Status: COMPLETED | OUTPATIENT
Start: 2025-02-01 | End: 2025-02-03

## 2025-02-01 RX ORDER — AMPICILLIN SODIUM AND SULBACTAM SODIUM 1; .5 G/1; G/1
3 INJECTION, POWDER, FOR SOLUTION INTRAMUSCULAR; INTRAVENOUS ONCE
Refills: 0 | Status: COMPLETED | OUTPATIENT
Start: 2025-02-01 | End: 2025-02-01

## 2025-02-01 RX ADMIN — HEPARIN SODIUM 14 UNIT(S)/HR: 1000 INJECTION INTRAVENOUS; SUBCUTANEOUS at 07:32

## 2025-02-01 RX ADMIN — OXYCODONE HYDROCHLORIDE 5 MILLIGRAM(S): 30 TABLET ORAL at 21:47

## 2025-02-01 RX ADMIN — Medication 650 MILLIGRAM(S): at 14:22

## 2025-02-01 RX ADMIN — ALBUMIN (HUMAN) 50 MILLILITER(S): 12.5 INJECTION, SOLUTION INTRAVENOUS at 00:39

## 2025-02-01 RX ADMIN — HEPARIN SODIUM 15 UNIT(S)/HR: 1000 INJECTION INTRAVENOUS; SUBCUTANEOUS at 23:51

## 2025-02-01 RX ADMIN — Medication 650 MILLIGRAM(S): at 21:20

## 2025-02-01 RX ADMIN — HEPARIN SODIUM 16 UNIT(S)/HR: 1000 INJECTION INTRAVENOUS; SUBCUTANEOUS at 08:41

## 2025-02-01 RX ADMIN — OXYCODONE HYDROCHLORIDE 5 MILLIGRAM(S): 30 TABLET ORAL at 22:20

## 2025-02-01 RX ADMIN — Medication 650 MILLIGRAM(S): at 21:50

## 2025-02-01 RX ADMIN — AMOXICILLIN AND CLAVULANATE POTASSIUM 1 TABLET(S): 500; 125 TABLET, FILM COATED ORAL at 06:12

## 2025-02-01 RX ADMIN — ALBUMIN (HUMAN) 50 MILLILITER(S): 12.5 INJECTION, SOLUTION INTRAVENOUS at 17:53

## 2025-02-01 RX ADMIN — Medication 650 MILLIGRAM(S): at 13:22

## 2025-02-01 RX ADMIN — HEPARIN SODIUM 15 UNIT(S)/HR: 1000 INJECTION INTRAVENOUS; SUBCUTANEOUS at 19:30

## 2025-02-01 RX ADMIN — HEPARIN SODIUM 14 UNIT(S)/HR: 1000 INJECTION INTRAVENOUS; SUBCUTANEOUS at 07:59

## 2025-02-01 RX ADMIN — INSULIN GLARGINE-YFGN 16 UNIT(S): 100 INJECTION, SOLUTION SUBCUTANEOUS at 21:19

## 2025-02-01 RX ADMIN — Medication 650 MILLIGRAM(S): at 07:13

## 2025-02-01 RX ADMIN — FUROSEMIDE 80 MILLIGRAM(S): 10 INJECTION INTRAMUSCULAR; INTRAVENOUS at 10:58

## 2025-02-01 RX ADMIN — INSULIN LISPRO 1: 100 INJECTION, SOLUTION INTRAVENOUS; SUBCUTANEOUS at 17:51

## 2025-02-01 RX ADMIN — ALBUMIN (HUMAN) 50 MILLILITER(S): 12.5 INJECTION, SOLUTION INTRAVENOUS at 11:45

## 2025-02-01 RX ADMIN — AMPICILLIN SODIUM AND SULBACTAM SODIUM 200 GRAM(S): 1; .5 INJECTION, POWDER, FOR SOLUTION INTRAMUSCULAR; INTRAVENOUS at 11:01

## 2025-02-01 RX ADMIN — HEPARIN SODIUM 15 UNIT(S)/HR: 1000 INJECTION INTRAVENOUS; SUBCUTANEOUS at 15:12

## 2025-02-01 RX ADMIN — Medication 650 MILLIGRAM(S): at 06:13

## 2025-02-01 RX ADMIN — Medication 81 MILLIGRAM(S): at 13:22

## 2025-02-01 RX ADMIN — ALBUMIN (HUMAN) 50 MILLILITER(S): 12.5 INJECTION, SOLUTION INTRAVENOUS at 06:11

## 2025-02-01 RX ADMIN — HEPARIN SODIUM 14 UNIT(S)/HR: 1000 INJECTION INTRAVENOUS; SUBCUTANEOUS at 06:12

## 2025-02-01 RX ADMIN — INSULIN LISPRO 1: 100 INJECTION, SOLUTION INTRAVENOUS; SUBCUTANEOUS at 07:53

## 2025-02-01 RX ADMIN — DOBUTAMINE 10.8 MICROGRAM(S)/KG/MIN: 250 INJECTION INTRAVENOUS at 07:31

## 2025-02-01 RX ADMIN — ATORVASTATIN CALCIUM 40 MILLIGRAM(S): 80 TABLET, FILM COATED ORAL at 21:20

## 2025-02-01 RX ADMIN — DOBUTAMINE 10.8 MICROGRAM(S)/KG/MIN: 250 INJECTION INTRAVENOUS at 21:44

## 2025-02-01 RX ADMIN — OXYCODONE HYDROCHLORIDE 5 MILLIGRAM(S): 30 TABLET ORAL at 00:41

## 2025-02-01 RX ADMIN — INSULIN LISPRO 1: 100 INJECTION, SOLUTION INTRAVENOUS; SUBCUTANEOUS at 12:05

## 2025-02-01 RX ADMIN — ALBUMIN (HUMAN) 50 MILLILITER(S): 12.5 INJECTION, SOLUTION INTRAVENOUS at 23:51

## 2025-02-01 NOTE — PROGRESS NOTE ADULT - ASSESSMENT
64F, hx of CHF (last TTE on 1/16/25 EF 30-35%, G2DD), DM, diabetic foot ulcer with a recent admission at [facility] for CHF exacerbation 1/14-1/17 p/w worsening R. leg pain and fluid secretion, was meeting sepsis criteria with podiatry having low suspicion for right cellulitis and admitted for continued management of HF exacerbation and needing ischemic eval.     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  -Left leg Arterial Duplex: Slightly tardus parvus waveform of the left popliteal artery is noted, for which underlying disease cannot be excluded. Posterior tibial artery waveform is nonpulsatile. Anterior tibial artery tardus parvus flow is noted.   Transferred to [facility] for CO2 angiogram as per vascular surgery    #  PAD Wound of lower extremity.   ·  Plan: Podiatry following, b/l serous bullae, no concerns for infection  Found to have RLE coolness  -Right Leg Arterial Duplex: Popliteal artery is occluded with negligible flow of right trifurcation arteries.  -Left leg Arterial Duplex: Slightly tardus parvus waveform of the left popliteal artery is noted, for which underlying disease cannot be excluded. Posterior tibial artery waveform is nonpulsatile. Anterior tibial artery tardus parvus flow is noted.  -Started on heparin gtt and dobutamine gtt -Will transfer to [facility] for CO2 angiogram as per vascular surgery as not candidate for CTA due to worsening SCr  -Keep compression dressing-LE elevation above heart level at rest.  -Transferred to [facility] for CO2 angiogram   - Overall this patient is at   intermediate  risk (for cardiac death, nonfatal myocardial infarction, and nonfatal cardiac arrest perioperatively for this intermediate  risk procedure).   No cardiac contraindications for CO2 vangiogram  There  are  no further recommendation for risk stratifying imaging/stress testing prior to planned surgery      # HFrEF (congestive heart failure).   ·  Plan: Hx of HF, previous admission in January, on home Lasix 40 qD, Metoprolol Succ 25 qD. Not on Entresto due to insurance issues  Last TTE: LVSF moderately decreased w/ EF 30-35 %. Moderate G2DD. Mild MR  Stress test: small-sized, moderate defect(s) in the apical wall that is predominantly fixed suggestive of an infarction with minimal marcus-infarct ischemia  Ischemic cardiomyopathy  GDMT on hold 2/2 JAMEL  Continue Dobutamine at present increased to 5 mcg/Kg/Min      # JAMEL  Creatinine Trend: 3.90<--4.40<--4.27<--3.60<--, 2.36<--, 1.55<--, 1.25<--, 1.25<--, 1.17<--  Cardiorenal   Renal consult noted  Creatinine downtrending hopefully will continue trend

## 2025-02-01 NOTE — PROGRESS NOTE ADULT - SUBJECTIVE AND OBJECTIVE BOX
[facility] NEPHROLOGY- PROGRESS NOTE    64y Female with history of CHF presents as a transfer for LE CO2 angiogram. Nephrology consulted for elevated Scr.    REVIEW OF SYSTEMS:  Gen: no fevers, + nosebleed overnight  Cards: no chest pain  Resp: + dyspnea with exertion  GI: +nausea and vomiting this morning, no diarrhea  Vascular: + LE edema    No Known Allergies      Hospital Medications: Medications reviewed      VITALS:  T(F): 97.5 (02-01-25 @ 07:46), Max: 97.9 (01-31-25 @ 17:00)  HR: 98 (02-01-25 @ 07:46)  BP: 124/76 (02-01-25 @ 07:46)  RR: 17 (02-01-25 @ 07:46)  SpO2: 97% (02-01-25 @ 07:46)  Wt(kg): --    01-31 @ 07:01  -  02-01 @ 07:00  --------------------------------------------------------  IN: 1185.6 mL / OUT: 1350 mL / NET: -164.4 mL    02-01 @ 07:01  -  02-01 @ 10:40  --------------------------------------------------------  IN: 0 mL / OUT: 200 mL / NET: -200 mL        PHYSICAL EXAM:    Gen: NAD, calm  Cards: RRR, +S1/S2, no M/G/R  Resp: bibasilar rales  GI: soft, NT/ND, NABS  Vascular: 2+ RLE edema > LLE edema      LABS:  02-01    134[L]  |  96  |  62[H]  ----------------------------<  160[H]  4.4   |  19[L]  |  3.90[H]    Ca    9.0      01 Feb 2025 07:16  Phos  5.2     02-01  Mg     2.3     02-01      Creatinine Trend: 3.90 <--, 4.40 <--, 4.27 <--, 3.60 <--, 2.36 <--, 1.55 <--, 1.25 <--                        9.0    11.50 )-----------( 375      ( 01 Feb 2025 07:17 )             26.0     Urine Studies:  Urinalysis Basic - ( 01 Feb 2025 07:16 )    Color:  / Appearance:  / SG:  / pH:   Gluc: 160 mg/dL / Ketone:   / Bili:  / Urobili:    Blood:  / Protein:  / Nitrite:    Leuk Esterase:  / RBC:  / WBC    Sq Epi:  / Non Sq Epi:  / Bacteria:       Creatinine, Random Urine: 33 mg/dL (01-30 @ 20:41)  Protein/Creatinine Ratio Calculation: 1.1 Ratio (01-30 @ 20:41)  Creatinine, Random Urine: 102 mg/dL (01-29 @ 11:53)  Sodium, Random Urine: 10 mmol/L (01-27 @ 13:10)  Potassium, Random Urine: 43 mmol/L (01-27 @ 13:10)  Creatinine, Random Urine: 122 mg/dL (01-27 @ 13:10)  Protein/Creatinine Ratio Calculation: 0.2 Ratio (01-27 @ 13:10)  Calcium, Random Urine: 1.0 mg/dL (01-27 @ 13:10)

## 2025-02-01 NOTE — CHART NOTE - NSCHARTNOTEFT_GEN_A_CORE
Pt not tolerating PO augmentin, has vomited and denying further PO augmentin - transitioned to unasyn renally dosed for final 2 days of abx.

## 2025-02-01 NOTE — PROGRESS NOTE ADULT - SUBJECTIVE AND OBJECTIVE BOX
Name of Patient : TOMASZ ALBA  MRN: [id number]      Subjective: Patient seen and examined. No new events except as noted.   doing better     REVIEW OF SYSTEMS:    CONSTITUTIONAL: No weakness, fevers or chills  EYES/ENT: No visual changes;  No vertigo or throat pain   NECK: No pain or stiffness  RESPIRATORY: No cough, wheezing, hemoptysis; No shortness of breath  CARDIOVASCULAR: No chest pain or palpitations  GASTROINTESTINAL: No abdominal or epigastric pain. No nausea, vomiting, or hematemesis; No diarrhea or constipation. No melena or hematochezia.  GENITOURINARY: No dysuria, frequency or hematuria  NEUROLOGICAL: No numbness or weakness  SKIN: No itching, burning, rashes, or lesions   All other review of systems is negative unless indicated above.    MEDICATIONS:  MEDICATIONS  (STANDING):  acetaminophen     Tablet .. 650 milliGRAM(s) Oral every 8 hours  albumin human 25% IVPB 50 milliLiter(s) IV Intermittent every 6 hours  ampicillin/sulbactam  IVPB 3 Gram(s) IV Intermittent daily  ampicillin/sulbactam  IVPB      aspirin enteric coated 81 milliGRAM(s) Oral daily  atorvastatin 40 milliGRAM(s) Oral at bedtime  dextrose 5%. 1000 milliLiter(s) (50 mL/Hr) IV Continuous <Continuous>  dextrose 5%. 1000 milliLiter(s) (100 mL/Hr) IV Continuous <Continuous>  dextrose 50% Injectable 25 Gram(s) IV Push once  dextrose 50% Injectable 12.5 Gram(s) IV Push once  dextrose 50% Injectable 25 Gram(s) IV Push once  DOBUTamine Infusion 5 MICROgram(s)/kG/Min (10.8 mL/Hr) IV Continuous <Continuous>  glucagon  Injectable 1 milliGRAM(s) IntraMuscular once  heparin  Infusion 14 Unit(s)/Hr (15 mL/Hr) IV Continuous <Continuous>  insulin glargine Injectable (LANTUS) 16 Unit(s) SubCutaneous at bedtime  insulin lispro (ADMELOG) corrective regimen sliding scale   SubCutaneous three times a day before meals  insulin lispro (ADMELOG) corrective regimen sliding scale   SubCutaneous at bedtime  metoprolol succinate ER 25 milliGRAM(s) Oral daily  polyethylene glycol 3350 17 Gram(s) Oral daily  senna 2 Tablet(s) Oral at bedtime      PHYSICAL EXAM:  T(C): 36.5 (02-01-25 @ 21:20), Max: 36.7 (02-01-25 @ 13:30)  HR: 104 (02-01-25 @ 21:20) (98 - 105)  BP: 101/61 (02-01-25 @ 21:20) (101/61 - 148/78)  RR: 18 (02-01-25 @ 21:20) (17 - 18)  SpO2: 96% (02-01-25 @ 21:20) (95% - 98%)  Wt(kg): --  I&O's Summary    31 Jan 2025 07:01  -  01 Feb 2025 07:00  --------------------------------------------------------  IN: 1185.6 mL / OUT: 1350 mL / NET: -164.4 mL    01 Feb 2025 07:01  -  02 Feb 2025 01:00  --------------------------------------------------------  IN: 915.4 mL / OUT: 1350 mL / NET: -434.6 mL          Appearance: Normal	  HEENT:  PERRLA   Lymphatic: No lymphadenopathy   Cardiovascular: Normal S1 S2, no JVD  Respiratory: normal effort , clear  Gastrointestinal:  Soft, Non-tender  Skin: No rashes,  warm to touch  Psychiatry:  Mood & affect appropriate  Musculuskeletal: LE dressing     recent labs, Imaging and EKGs personally reviewed   CODE status discussed with the patient in detail    01-31-25 @ 07:01  -  02-01-25 @ 07:00  --------------------------------------------------------  IN: 1185.6 mL / OUT: 1350 mL / NET: -164.4 mL    02-01-25 @ 07:01  -  02-02-25 @ 01:00  --------------------------------------------------------  IN: 915.4 mL / OUT: 1350 mL / NET: -434.6 mL                          9.0    11.50 )-----------( 375      ( 01 Feb 2025 07:17 )             26.0               02-01    134[L]  |  96  |  62[H]  ----------------------------<  160[H]  4.4   |  19[L]  |  3.90[H]    Ca    9.0      01 Feb 2025 07:16  Phos  5.2     02-01  Mg     2.3     02-01      PTT - ( 01 Feb 2025 21:50 )  PTT:95.8 sec                   Urinalysis Basic - ( 01 Feb 2025 07:16 )    Color: x / Appearance: x / SG: x / pH: x  Gluc: 160 mg/dL / Ketone: x  / Bili: x / Urobili: x   Blood: x / Protein: x / Nitrite: x   Leuk Esterase: x / RBC: x / WBC x   Sq Epi: x / Non Sq Epi: x / Bacteria: x

## 2025-02-01 NOTE — PROGRESS NOTE ADULT - ASSESSMENT
64y Female with history of CHF presents as a transfer for LE CO2 angiogram. Nephrology consulted for elevated Scr.    1) JAMEL: likely due to ATN in setting of infection/hypotension. Scr improving. UA relatively bland. FeUrea low consistent with low flow state. Renal US unremarkable. Continue with  and IV albumin. Avoid nephrotoxins.    2) HTN: BP low normal. Metoprolol as per cardiology. Holding ARNI.    3) LE edema: Not secondary to nephrotic syndrome given subnephrotic range proteinuria. Given improving renal function, will give lasix 80 mg IV X 1 dose today. TTE with moderately decreased LVSF. Monitor UO.    4) Hyperphosphatemia: Improving. Continue with renal diet. Defer binders.      [facility] NEPHROLOGY  Raji Waterman M.D.  Mars Feliz D.O.  Kelley Parks M.D.  MD Nancy Kulkarni, MSN, ANP-C    Telephone: [phone]  Facsimile: [phone] [place] [address], [id number]  [place] [address]

## 2025-02-01 NOTE — PROGRESS NOTE ADULT - ASSESSMENT
Patient is a 64 year old Female, with a PMHx of of CHF (last TTE on 01/16/25 with EF of 30-35%, G2DD), DM, diabetic foot ulcer with a recent admission at [facility] for CHF exacerbation who presents to [facility] as a transfer for worsening right leg pain and fluid secretion. As per documentation, patient was reported to have severe depletion of RLE blood flow beyond the popliteal vessel at knee and similar findings in the left. Patient was transferred to [facility] for CO2 angiogram with Dr. Baltazar. Of note, patient with reported visual disturbance for which patient was seen and evaluated by opthalmology Internal Medicine has been consulted on Ms. Kirby's care for medical management.       CHF - HFrEF  - 1/16/2025 TTE with REDUCED EF of 30-35%, Grade II LVDD, Mild MR, Trace TR/ AL, Trace pericardial effusion  - NST - Abnormal, w/ small-sized, moderate defect(s) in apical wall that is predominantly fixed suggestive of an infarction with minimal marcus-infarct ischemia, post stress LV EF 25 %  - CT Chest w/ Small B/L pleural effusions and small pericardial effusion.  - GDMT as per Cardio - currently on Toprol XL 25 PO Qd (In view of JAMEL further medications currently held)   - On Dobutamine gtt    - Monitor I and O; Maintain O > I; Check daily weights   - Monitor volume status; Monitor electrolytes   - Monitor on tele   - Cardio eval appreciated; F/u recs    Diabetic Foot Ulcer   - RLE Arterial Duplex w/ Popliteal artery occlusion   - LLE Arterial Duplex w/ underlying disease cannot be excluded   - On Lipitor  - On Hep gtt; Monitor Ptt and adjust as per normogram; Monitor H/H and for gross bleeding  - Trend inflammatory markers   - Transferred to [facility] for CO2 angiogram with Vascular - Needs RENAL Clearance prior. Patient is deemed to be a high risk candidate. Cardiac clearance as per Cardiology.  - Vascular care appreciated; F/u recs    Leukocytosis   - Likely 2/2 LE   - 1/23 BCx2 negative; UCx w/ probable contamination   - On Amoxicillin from OSH   - Trend CBC, temp curve, VS and adjust as tolerated     JAMEL, Hyponatremia, Metabolic Acidosis   - As per OSH documentation, JAMEL was thought to be 2/2 to Unasyn/ Bumex / Entresto use and hypotension   - Check Bladder scan, noted   - Check Renal US noted   - Cr up-trending; Monitor for LILIAN   - Avoid nephrotoxic agents  - Monitor Cr and daily BMP; Avoid overcorrection of Na > 6-8 mEq in 24 hours   - Renal eval appreciated; F/u recs   - Albumin per renal   - improved renal function, monitor     Visual Disturbance  - Consider CT Head   - Opthalmology eval appreciated; F/u recs    LE Edema  - W/ notable LE Edema on exam  - Diuresis on hold as per renal in view of Cr  - LE elevation; Compression  - Monitor     Pericardial effusion   - TTE w/ trace pericardial effusion   - CT Chest w/ small pericardial effusion   - Denies chest pain, palpitations, chest tightness or discomfort, shortness of breath or dyspnea   - Repeat TTE in 1 month for further evaluation   - Monitor on tele  - Cardio follow up     Pleural effusion   - CT Chest w/ small B/L pleural effusions  - On RA; No respiratory complaints at present  - Monitor O2 saturation; Supplement to maintain > 90%   - Diuresis on hold in view of Cr    Anemia   - Check anemia labs - Iron, B12, Folate, Ferritin   - On Hep gtt as above; Monitor   - Transfuse for Hgb < 7.5  - Maintain active T/S; Monitor H/H; Large bore IV access    Diabetes Mellitus   - A1C 11.6   - C/w Sliding scale, Lantus (If NPO, reduce Lantus)   - Diabetic, DASH Diet  - Monitor glucose levels; Adjust insulin regimen as tolerated   - ENDO eval recommended   - Patient will require outpatient Endo, Renal, Optho and Podiatry follow ups upon DC    HLD  - Check Lipid panel   - On Lipitor 40     HTN   - On Toprol XL as above   - On Dobutamine gtt at present   - Monitor BP, VS and adjust as tolerated; Reported to be hypotensive at OSH as per documentation     PPX    Patient seen and examined by me. patient care and plan discussed and reviewed with PA. Plan as outlined above edited by me to reflect our discussion. Advanced care planning/advanced directives discussed with patient/family. DNR status including forceful chest compressions to attempt to restart the heart, ventilator support/artificial breathing, electric shock, artificial nutrition, health care proxy, Molst form all discussed with pt. Sixty seven minutes of total time dedicated to this patient visit today including preparing to see the patient (eg. review of tests), obtaining and/or reviewing separately obtained history, obtaining/reviewing vitals, performing a medically appropriate examination and/or evaluation, counseling and educating the patient/family/caregiver, reviewing previous notes and test results, and procedures, communicating with other health professionals (when not separately reported), and documenting clinical information in the electronic health record.

## 2025-02-01 NOTE — PROGRESS NOTE ADULT - SUBJECTIVE AND OBJECTIVE BOX
MR#[id number]  PATIENT NAME:COLE ALBA    DATE OF SERVICE: 02-01-25 @ 07:56  Patient was seen and examined by Yordy Gilmore MD on    02-01-25 @ 07:56 .  Interim events noted.Consultant notes ,Labs,Telemetry reviewed by me       HOSPITAL COURSE: HPI:  64F, hx of CHF (last TTE on 1/16/25 EF 30-35%, G2DD), DM, diabetic foot ulcer with a recent admission at [facility] for CHF exacerbation presented as a transfer for worsening R. leg pain and fluid secretion, On non-invasive imaging demonstrating severe depletion of RLE blood flow beyond the popliteal vessel at knee and similar findings in L. Was transferred to [facility] for CO2 angiogram with Dr. Baltazar. (29 Jan 2025 20:05)      Transferred from [facility]-TTE on previous admission: LVSF moderately decreased w/EF 30-35%. Moderate G2DD. Mild MR. Ischemic evaluation was recommended for which patient undergone stress test which revealed a small-sized moderate defect in the apical wall that is predominantly fixed suggestive of an infarction with minimal marcus-infarct ischemia. 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 Left leg Arterial Duplex: Slightly tardus parvus waveform of the left popliteal artery is noted, for which underlying disease cannot be excluded. Posterior tibial artery waveform is nonpulsatile. Anterior tibial artery tardus parvus flow is noted. Patient started on Heparin and Dobutamine drip with transfer to [facility] for further management.      INTERIM EVENTS:Patient seen at bedside ,interim events noted.  Awake alert seen by Opthal-for altered vision,no chest pain still c/o Right LE pain  02/01-Awake not dyspneac remains on  5mcg/Kg/Min        PMH -reviewed admission note, no change since admission  HEART FAILURE: Acute[x ]Chronic[x ] Systolic[x ] Diastolic[ ] Combined Systolic and Diastolic[ ]  CAD[ ] CABG[ ] PCI[ ]  DEVICES[ ] PPM[ ] ICD[ ] ILR[ ]  ATRIAL FIBRILLATION[ ] Paroxysmal[ ] Permanent[ ] CHADS2-[  ]  JAMEL[x ] CKD1[ ] CKD2[ ] CKD3[ ] CKD4[ ] ESRD[ ]  COPD[ ] HTN[x ]   DM[x ] Type1[ ] Type 2[x ]   CVA[ ] Paresis[ ]    AMBULATION: Assisted[ ] Cane/walker[x ] Independent[ ]  MEDICATIONS  (STANDING):  acetaminophen     Tablet .. 650 milliGRAM(s) Oral every 8 hours  amoxicillin  500 milliGRAM(s)/clavulanate 1 Tablet(s) Oral every 12 hours  aspirin enteric coated 81 milliGRAM(s) Oral daily  atorvastatin 40 milliGRAM(s) Oral at bedtime  DOBUTamine Infusion 5 MICROgram(s)/kG/Min (10.8 mL/Hr) IV Continuous <Continuous>  glucagon  Injectable 1 milliGRAM(s) IntraMuscular once  heparin  Infusion 14 Unit(s)/Hr (14 mL/Hr) IV Continuous <Continuous>  insulin glargine Injectable (LANTUS) 16 Unit(s) SubCutaneous at bedtime  insulin lispro (ADMELOG) corrective regimen sliding scale   SubCutaneous three times a day before meals  metoprolol succinate ER 25 milliGRAM(s) Oral daily  polyethylene glycol 3350 17 Gram(s) Oral daily  senna 2 Tablet(s) Oral at bedtime    MEDICATIONS  (PRN):  dextrose Oral Gel 15 Gram(s) Oral once PRN Blood Glucose LESS THAN 70 milliGRAM(s)/deciliter  melatonin 3 milliGRAM(s) Oral at bedtime PRN Insomnia  ondansetron Injectable 4 milliGRAM(s) IV Push every 6 hours PRN Nausea and/or Vomiting  oxyCODONE    IR 5 milliGRAM(s) Oral every 6 hours PRN Severe Pain (7 - 10)            REVIEW OF SYSTEMS:  Constitutional: [ ] fever, [ ]weight loss,  [x ]fatigue [ ]weight gain  Eyes: [ ] visual changes  Respiratory: [ ]shortness of breath;  [ ] cough, [ ]wheezing, [ ]chills, [ ]hemoptysis  Cardiovascular: [ ] chest pain, [ ]palpitations, [ ]dizziness,  [ ]leg swelling[ ]orthopnea[ ]PND  Gastrointestinal: [ ] abdominal pain, [ ]nausea, [ ]vomiting,  [ ]diarrhea [ ]Constipation [ ]Melena  Genitourinary: [ ] dysuria, [ ] hematuria [ ][place]  Neurologic: [ ] headaches [ ] tremors[ ]weakness [ ]Paralysis Right[ ] Left[ ]  Skin: [ ] itching, [ ]burning, [ ] rashes  Endocrine: [ ] heat or cold intolerance  Musculoskeletal: [ ] joint pain or swelling; [x ] muscle, back, or extremity pain  Psychiatric: [ ] depression, [ ]anxiety, [ ]mood swings, or [ ]difficulty sleeping  Hematologic: [ ] easy bruising, [ ] bleeding gums    [ ] All remaining systems negative except as per above.   [ ]Unable to obtain.  [x] No change in ROS since admission      Vital Signs Last 24 Hrs  T(C): 36.4 (01 Feb 2025 07:46), Max: 36.6 (31 Jan 2025 17:00)  T(F): 97.5 (01 Feb 2025 07:46), Max: 97.9 (31 Jan 2025 17:00)  HR: 98 (01 Feb 2025 07:46) (92 - 109)  BP: 124/76 (01 Feb 2025 07:46) (104/60 - 124/76)  RR: 17 (01 Feb 2025 07:46) (17 - 18)  SpO2: 97% (01 Feb 2025 07:46) (94% - 97%)    Parameters below as of 01 Feb 2025 07:46  Patient On (Oxygen Delivery Method): room air      I&O's Summary    31 Jan 2025 07:01  -  01 Feb 2025 07:00  --------------------------------------------------------  IN: 676.8 mL / OUT: 1350 mL / NET: -673.2 mL        PHYSICAL EXAM:  General: No acute distress BMI-28  HEENT: EOMI, PERRL  Neck: Supple, [ ] JVD  Lungs: Equal air entry bilaterally; [ ] rales [ ] wheezing [ ] rhonchi  Heart: Regular rate and rhythm; [x ] murmur   2/6 [ x] systolic [ ] diastolic [ ] radiation[ ] rubs [ ]  gallops  Abdomen: Nontender, bowel sounds present  Extremities: No clubbing, cyanosis, [ ] edema [ x]LLE: 2+ pitting edema of lower leg. Stable ulcerated toes. Sensation and ROM intact.   RLE: 2+ pitting edema of lower leg. Toes cooler compared to right. Sensation and ROM intact. Dressing changed with xeroform, kerlex and ACE wrap. Nervous system:  Alert & Oriented X3, no focal deficits  Psychiatric: Normal affect  Skin: No rashes or lesions    LABS:  02-01    134[L]  |  96  |  62[H]  ----------------------------<  160[H]  4.4   |  19[L]  |  3.90[H]    Ca    9.0      01 Feb 2025 07:16  Phos  5.2     02-01  Mg     2.3     02-01      Creatinine Trend: 3.90<--, 4.40<--, 4.27<--, 3.60<--, 2.36<--, 1.55<--                        8.9    11.08 )-----------( 408      ( 31 Jan 2025 06:47 )             26.1     PTT - ( 01 Feb 2025 07:17 )  PTT:49.7 sec          TTE W or WO Ultrasound Enhancing Agent (01.16.25 @ 12:45) >  CONCLUSIONS:      1. Left ventricular systolic function is moderately decreased with an ejection fraction visually estimated at 30 to 35 %.   2. There is moderate (grade 2) left ventricular diastolic dysfunction.   3. Mild mitral regurgitation.   4. Trace tricuspid regurgitation.   5. Trace pulmonic regurgitation.   6. Trace pericardial effusion.   7. Right pleural effusion noted.   8. No prior echocardiogram is available for comparison.  US Physiol Extremity Lower 3+ Level, BI (01.27.25 @ 14:28) >  IMPRESSION:    Limited CHARMAINE-PVR evaluation due to noncompressible vasculature. Subsequent  arterial duplex ultrasound was performed due to abnormal PVR findings,  particularly of the right leg.    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 Correlate for right leg ischemia.    Left leg Arterial Duplex: Slightly tardus parvus waveform of the left  popliteal artery is noted, for which underlying disease cannot be  excluded. Posterior tibial artery waveform is nonpulsatile. Anterior  tibial artery tardus parvus flow is noted. Correlate for left leg  ischemia.    Recommend vascular surgery evaluation and runoff CTA for further  evaluation.    CT Chest No Cont (01.25.25 @ 14:08) >  IMPRESSION: Small bilateral pleural effusions and small pericardial   effusion.        Nuclear Stress Test-Pharmacologic.. (01.24.25 @ 10:31) >  Conclusions:   1. Myocardial Perfusion: Abnormal.   2. Qualitative Perfusion:      - small-sized, moderate defect(s) in the apical wall that is predominantly fixed suggestive of an infarction with minimal marcus-infarct ischemia.   3. The post stress left ventricular EF is 25 %. The stress end diastolic volume is 118 ml.   4. Results D/Wcardiologist Dr. Gilmore at 18:31       12 Lead ECG (01.23.25 @ 10:23) >  Sinus tachycardia  Cannot rule out Anterior infarct , age undetermined  ST & T wave abnormality, consider inferolateral ischemia  Abnormal ECG

## 2025-02-01 NOTE — PROVIDER CONTACT NOTE (OTHER) - ASSESSMENT
Pt A&Ox4, VSS, is currently on heparin gtt at 14mL. Pt reports having nose bleed, minimal blood noted on tissue.

## 2025-02-02 LAB
ANION GAP SERPL CALC-SCNC: 24 MMOL/L — HIGH (ref 5–17)
APTT BLD: 97.4 SEC — HIGH (ref 24.5–35.6)
B-OH-BUTYR SERPL-SCNC: 0.6 MMOL/L — HIGH
BUN SERPL-MCNC: 54 MG/DL — HIGH (ref 7–23)
CALCIUM SERPL-MCNC: 9.1 MG/DL — SIGNIFICANT CHANGE UP (ref 8.4–10.5)
CHLORIDE SERPL-SCNC: 93 MMOL/L — LOW (ref 96–108)
CO2 SERPL-SCNC: 13 MMOL/L — LOW (ref 22–31)
CREAT SERPL-MCNC: 2.65 MG/DL — HIGH (ref 0.5–1.3)
EGFR: 20 ML/MIN/1.73M2 — LOW
EGFR: 20 ML/MIN/1.73M2 — LOW
GAS PNL BLDV: SIGNIFICANT CHANGE UP
GLUCOSE BLDC GLUCOMTR-MCNC: 159 MG/DL — HIGH (ref 70–99)
GLUCOSE BLDC GLUCOMTR-MCNC: 162 MG/DL — HIGH (ref 70–99)
GLUCOSE BLDC GLUCOMTR-MCNC: 175 MG/DL — HIGH (ref 70–99)
GLUCOSE BLDC GLUCOMTR-MCNC: 185 MG/DL — HIGH (ref 70–99)
GLUCOSE SERPL-MCNC: 253 MG/DL — HIGH (ref 70–99)
HCT VFR BLD CALC: 26.3 % — LOW (ref 34.5–45)
HGB BLD-MCNC: 9.2 G/DL — LOW (ref 11.5–15.5)
MAGNESIUM SERPL-MCNC: 2.2 MG/DL — SIGNIFICANT CHANGE UP (ref 1.6–2.6)
MCHC RBC-ENTMCNC: 24.9 PG — LOW (ref 27–34)
MCHC RBC-ENTMCNC: 35 G/DL — SIGNIFICANT CHANGE UP (ref 32–36)
MCV RBC AUTO: 71.3 FL — LOW (ref 80–100)
NRBC # BLD: 0 /100 WBCS — SIGNIFICANT CHANGE UP (ref 0–0)
NRBC BLD-RTO: 0 /100 WBCS — SIGNIFICANT CHANGE UP (ref 0–0)
PHOSPHATE SERPL-MCNC: 4.7 MG/DL — HIGH (ref 2.5–4.5)
PLATELET # BLD AUTO: 381 K/UL — SIGNIFICANT CHANGE UP (ref 150–400)
POTASSIUM SERPL-MCNC: 4.8 MMOL/L — SIGNIFICANT CHANGE UP (ref 3.5–5.3)
POTASSIUM SERPL-SCNC: 4.8 MMOL/L — SIGNIFICANT CHANGE UP (ref 3.5–5.3)
RBC # BLD: 3.69 M/UL — LOW (ref 3.8–5.2)
RBC # FLD: 15.5 % — HIGH (ref 10.3–14.5)
SODIUM SERPL-SCNC: 130 MMOL/L — LOW (ref 135–145)
WBC # BLD: 13.05 K/UL — HIGH (ref 3.8–10.5)
WBC # FLD AUTO: 13.05 K/UL — HIGH (ref 3.8–10.5)

## 2025-02-02 PROCEDURE — 93010 ELECTROCARDIOGRAM REPORT: CPT

## 2025-02-02 RX ORDER — FUROSEMIDE 10 MG/ML
80 INJECTION INTRAMUSCULAR; INTRAVENOUS ONCE
Refills: 0 | Status: COMPLETED | OUTPATIENT
Start: 2025-02-02 | End: 2025-02-02

## 2025-02-02 RX ADMIN — INSULIN GLARGINE-YFGN 16 UNIT(S): 100 INJECTION, SOLUTION SUBCUTANEOUS at 21:25

## 2025-02-02 RX ADMIN — ALBUMIN (HUMAN) 50 MILLILITER(S): 12.5 INJECTION, SOLUTION INTRAVENOUS at 05:18

## 2025-02-02 RX ADMIN — FUROSEMIDE 80 MILLIGRAM(S): 10 INJECTION INTRAMUSCULAR; INTRAVENOUS at 17:19

## 2025-02-02 RX ADMIN — INSULIN LISPRO 1: 100 INJECTION, SOLUTION INTRAVENOUS; SUBCUTANEOUS at 12:19

## 2025-02-02 RX ADMIN — ALBUMIN (HUMAN) 50 MILLILITER(S): 12.5 INJECTION, SOLUTION INTRAVENOUS at 17:18

## 2025-02-02 RX ADMIN — AMPICILLIN SODIUM AND SULBACTAM SODIUM 200 GRAM(S): 1; .5 INJECTION, POWDER, FOR SOLUTION INTRAMUSCULAR; INTRAVENOUS at 12:23

## 2025-02-02 RX ADMIN — HEPARIN SODIUM 15 UNIT(S)/HR: 1000 INJECTION INTRAVENOUS; SUBCUTANEOUS at 07:30

## 2025-02-02 RX ADMIN — Medication 650 MILLIGRAM(S): at 05:18

## 2025-02-02 RX ADMIN — ALBUMIN (HUMAN) 50 MILLILITER(S): 12.5 INJECTION, SOLUTION INTRAVENOUS at 23:09

## 2025-02-02 RX ADMIN — ALBUMIN (HUMAN) 50 MILLILITER(S): 12.5 INJECTION, SOLUTION INTRAVENOUS at 12:22

## 2025-02-02 RX ADMIN — HEPARIN SODIUM 15 UNIT(S)/HR: 1000 INJECTION INTRAVENOUS; SUBCUTANEOUS at 08:31

## 2025-02-02 RX ADMIN — Medication 650 MILLIGRAM(S): at 05:48

## 2025-02-02 RX ADMIN — Medication 650 MILLIGRAM(S): at 21:25

## 2025-02-02 RX ADMIN — Medication 81 MILLIGRAM(S): at 12:19

## 2025-02-02 RX ADMIN — DOBUTAMINE 10.8 MICROGRAM(S)/KG/MIN: 250 INJECTION INTRAVENOUS at 21:06

## 2025-02-02 RX ADMIN — INSULIN LISPRO 1: 100 INJECTION, SOLUTION INTRAVENOUS; SUBCUTANEOUS at 17:19

## 2025-02-02 RX ADMIN — HEPARIN SODIUM 15 UNIT(S)/HR: 1000 INJECTION INTRAVENOUS; SUBCUTANEOUS at 19:35

## 2025-02-02 RX ADMIN — DOBUTAMINE 10.8 MICROGRAM(S)/KG/MIN: 250 INJECTION INTRAVENOUS at 07:31

## 2025-02-02 RX ADMIN — INSULIN LISPRO 1: 100 INJECTION, SOLUTION INTRAVENOUS; SUBCUTANEOUS at 09:22

## 2025-02-02 RX ADMIN — Medication 650 MILLIGRAM(S): at 21:55

## 2025-02-02 RX ADMIN — ATORVASTATIN CALCIUM 40 MILLIGRAM(S): 80 TABLET, FILM COATED ORAL at 21:26

## 2025-02-02 NOTE — PROGRESS NOTE ADULT - ASSESSMENT
64F, hx of CHF (last TTE on 1/16/25 EF 30-35%, G2DD), DM, diabetic foot ulcer with a recent admission at [facility] for CHF exacerbation 1/14-1/17 p/w worsening R. leg pain and fluid secretion, was meeting sepsis criteria with podiatry having low suspicion for right cellulitis and admitted for continued management of HF exacerbation and needing ischemic eval.     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  -Left leg Arterial Duplex: Slightly tardus parvus waveform of the left popliteal artery is noted, for which underlying disease cannot be excluded. Posterior tibial artery waveform is nonpulsatile. Anterior tibial artery tardus parvus flow is noted.   Transferred to [facility] for CO2 angiogram as per vascular surgery    #  PAD Wound of lower extremity.   ·  Plan: Podiatry following, b/l serous bullae, no concerns for infection  Found to have RLE coolness  -Right Leg Arterial Duplex: Popliteal artery is occluded with negligible flow of right trifurcation arteries.  -Left leg Arterial Duplex: Slightly tardus parvus waveform of the left popliteal artery is noted, for which underlying disease cannot be excluded. Posterior tibial artery waveform is nonpulsatile. Anterior tibial artery tardus parvus flow is noted.  -Started on heparin gtt and dobutamine gtt -Will transfer to [facility] for CO2 angiogram as per vascular surgery as not candidate for CTA due to worsening SCr  -Keep compression dressing-LE elevation above heart level at rest.  -Transferred to [facility] for CO2 angiogram   - Overall this patient is at   intermediate  risk (for cardiac death, nonfatal myocardial infarction, and nonfatal cardiac arrest perioperatively for this intermediate  risk procedure).   No cardiac contraindications for CO2 vangiogram  There  are  no further recommendation for risk stratifying imaging/stress testing prior to planned surgery      # HFrEF (congestive heart failure).   ·  Plan: Hx of HF, previous admission in January, on home Lasix 40 qD, Metoprolol Succ 25 qD. Not on Entresto due to insurance issues  Last TTE: LVSF moderately decreased w/ EF 30-35 %. Moderate G2DD. Mild MR  Stress test: small-sized, moderate defect(s) in the apical wall that is predominantly fixed suggestive of an infarction with minimal marcus-infarct ischemia  Ischemic cardiomyopathy  GDMT on hold 2/2 JAMEL  Continue Dobutamine at present increased to 5 mcg/Kg/Min      # JAMEL  Creatinine Trend: 3.90<--4.40<--4.27<--3.60<--, 2.36<--, 1.55<--, 1.25<--, 1.25<--, 1.17<--  Cardiorenal   Renal consult noted  Creatinine downtrending hopefully will continue trend

## 2025-02-02 NOTE — CHART NOTE - NSCHARTNOTEFT_GEN_A_CORE
Notified by RN, patient with HR in 110s on telemetry. Baseline 90s-100s. Patient sitting comfortably in chair, in no apparent distress. Brother at bedside. Patient states she feels better than previous, endorsing poor appetite but no other complaints. States she was sleeping when RN arrived to her room to perform ECG. Patient denies fever/chills, pain, SOB, palpitations, CP or any other symptoms at this time. Vital signs stable. ECG reviewed by Attending Dr. Lu who is to further discuss with Cardiology in the AM. Continue to monitor closely. Patient status and events signed out to night ACP for continued management and care.     Kendal King PA-C

## 2025-02-02 NOTE — PROGRESS NOTE ADULT - SUBJECTIVE AND OBJECTIVE BOX
Name of Patient : TOMASZ ALBA  MRN: [id number]  Date of visit: 02-02-25      Subjective: Patient seen and examined. No new events except as noted.   doing better  improved renal function  diuresis per renal given     REVIEW OF SYSTEMS:    CONSTITUTIONAL: No weakness, fevers or chills  EYES/ENT: No visual changes;  No vertigo or throat pain   NECK: No pain or stiffness  RESPIRATORY: No cough, wheezing, hemoptysis; No shortness of breath  CARDIOVASCULAR: No chest pain or palpitations  GASTROINTESTINAL: No abdominal or epigastric pain. No nausea, vomiting, or hematemesis; No diarrhea or constipation. No melena or hematochezia.  GENITOURINARY: No dysuria, frequency or hematuria  NEUROLOGICAL: No numbness or weakness  SKIN: No itching, burning, rashes, or lesions   All other review of systems is negative unless indicated above.    MEDICATIONS:  MEDICATIONS  (STANDING):  acetaminophen     Tablet .. 650 milliGRAM(s) Oral every 8 hours  albumin human 25% IVPB 50 milliLiter(s) IV Intermittent every 6 hours  ampicillin/sulbactam  IVPB 3 Gram(s) IV Intermittent daily  ampicillin/sulbactam  IVPB      aspirin enteric coated 81 milliGRAM(s) Oral daily  atorvastatin 40 milliGRAM(s) Oral at bedtime  benzocaine/menthol Lozenge 1 Lozenge Oral once  dextrose 5%. 1000 milliLiter(s) (100 mL/Hr) IV Continuous <Continuous>  dextrose 5%. 1000 milliLiter(s) (50 mL/Hr) IV Continuous <Continuous>  dextrose 50% Injectable 25 Gram(s) IV Push once  dextrose 50% Injectable 12.5 Gram(s) IV Push once  dextrose 50% Injectable 25 Gram(s) IV Push once  DOBUTamine Infusion 5 MICROgram(s)/kG/Min (10.8 mL/Hr) IV Continuous <Continuous>  glucagon  Injectable 1 milliGRAM(s) IntraMuscular once  heparin  Infusion 14 Unit(s)/Hr (15 mL/Hr) IV Continuous <Continuous>  insulin glargine Injectable (LANTUS) 16 Unit(s) SubCutaneous at bedtime  insulin lispro (ADMELOG) corrective regimen sliding scale   SubCutaneous three times a day before meals  insulin lispro (ADMELOG) corrective regimen sliding scale   SubCutaneous at bedtime  metoprolol succinate ER 25 milliGRAM(s) Oral daily  polyethylene glycol 3350 17 Gram(s) Oral daily  senna 2 Tablet(s) Oral at bedtime      PHYSICAL EXAM:  T(C): 37.4 (02-02-25 @ 21:17), Max: 37.4 (02-02-25 @ 21:17)  HR: 116 (02-02-25 @ 21:17) (98 - 118)  BP: 125/71 (02-02-25 @ 21:17) (96/61 - 125/71)  RR: 18 (02-02-25 @ 21:17) (18 - 18)  SpO2: 94% (02-02-25 @ 21:17) (94% - 97%)  Wt(kg): --  I&O's Summary    01 Feb 2025 07:01  -  02 Feb 2025 07:00  --------------------------------------------------------  IN: 1320.2 mL / OUT: 1350 mL / NET: -29.8 mL    02 Feb 2025 07:01  -  02 Feb 2025 22:53  --------------------------------------------------------  IN: 759.6 mL / OUT: 700 mL / NET: 59.6 mL          Appearance: Normal	  HEENT:  PERRLA   Lymphatic: No lymphadenopathy   Cardiovascular: Normal S1 S2, no JVD  Respiratory: normal effort , clear  Gastrointestinal:  Soft, Non-tender  Skin: No rashes,  warm to touch  Psychiatry:  Mood & affect appropriate  Musculuskeletal:  le dressing     recent labs, Imaging and EKGs personally reviewed   CODE status discussed with the patient in detail      02-01-25 @ 07:01  -  02-02-25 @ 07:00  --------------------------------------------------------  IN: 1320.2 mL / OUT: 1350 mL / NET: -29.8 mL    02-02-25 @ 07:01  -  02-02-25 @ 22:53  --------------------------------------------------------  IN: 759.6 mL / OUT: 700 mL / NET: 59.6 mL                          9.2    13.05 )-----------( 381      ( 02 Feb 2025 07:22 )             26.3               02-02    130[L]  |  93[L]  |  54[H]  ----------------------------<  253[H]  4.8   |  13[L]  |  2.65[H]    Ca    9.1      02 Feb 2025 09:46  Phos  4.7     02-02  Mg     2.2     02-02      PTT - ( 02 Feb 2025 07:28 )  PTT:97.4 sec                   Urinalysis Basic - ( 02 Feb 2025 09:46 )    Color: x / Appearance: x / SG: x / pH: x  Gluc: 253 mg/dL / Ketone: x  / Bili: x / Urobili: x   Blood: x / Protein: x / Nitrite: x   Leuk Esterase: x / RBC: x / WBC x   Sq Epi: x / Non Sq Epi: x / Bacteria: x

## 2025-02-02 NOTE — PROGRESS NOTE ADULT - ASSESSMENT
Patient is a 64 year old Female, with a PMHx of of CHF (last TTE on 01/16/25 with EF of 30-35%, G2DD), DM, diabetic foot ulcer with a recent admission [facility] for CHF exacerbation who presents to [facility] as a transfer for worsening right leg pain and fluid secretion. As per documentation, patient was reported to have severe depletion of RLE blood flow beyond the popliteal vessel at knee and similar findings in the left. Patient was transferred to [facility] for CO2 angiogram with Dr. Baltazar. Of note, patient with reported visual disturbance for which patient was seen and evaluated by opthalmology Internal Medicine has been consulted on Ms. Kirby's care for medical management.       CHF - HFrEF  - 1/16/2025 TTE with REDUCED EF of 30-35%, Grade II LVDD, Mild MR, Trace TR/ OR, Trace pericardial effusion  - NST - Abnormal, w/ small-sized, moderate defect(s) in apical wall that is predominantly fixed suggestive of an infarction with minimal marcus-infarct ischemia, post stress LV EF 25 %  - CT Chest w/ Small B/L pleural effusions and small pericardial effusion.  - GDMT as per Cardio - currently on Toprol XL 25 PO Qd (In view of JAMEL further medications currently held)   - On Dobutamine gtt    - Monitor I and O; Maintain O > I; Check daily weights   - Monitor volume status; Monitor electrolytes   - Monitor on tele   - Cardio eval appreciated; F/u recs    Diabetic Foot Ulcer   - RLE Arterial Duplex w/ Popliteal artery occlusion   - LLE Arterial Duplex w/ underlying disease cannot be excluded   - On Lipitor  - On Hep gtt; Monitor Ptt and adjust as per normogram; Monitor H/H and for gross bleeding  - Trend inflammatory markers   - Transferred to [facility] for CO2 angiogram with Vascular - Needs RENAL Clearance prior. Patient is deemed to be a high risk candidate. Cardiac clearance as per Cardiology.  - Vascular care appreciated; F/u recs    Leukocytosis   - Likely 2/2 LE   - 1/23 BCx2 negative; UCx w/ probable contamination   - On Amoxicillin from OSH   - Trend CBC, temp curve, VS and adjust as tolerated     JAMEL, Hyponatremia, Metabolic Acidosis   - As per OSH documentation, JAMEL was thought to be 2/2 to Unasyn/ Bumex / Entresto use and hypotension   - Check Bladder scan, noted   - Check Renal US noted   - Cr up-trending; Monitor for LILIAN   - Avoid nephrotoxic agents  - Monitor Cr and daily BMP; Avoid overcorrection of Na > 6-8 mEq in 24 hours   - Renal eval appreciated; F/u recs   - Albumin per renal   - improved renal function, monitor   - diuresis as per renal     Visual Disturbance  - Consider CT Head   - Opthalmology eval appreciated; F/u recs    LE Edema  - W/ notable LE Edema on exam  - Diuresis on hold as per renal in view of Cr  - LE elevation; Compression  - Monitor     Pericardial effusion   - TTE w/ trace pericardial effusion   - CT Chest w/ small pericardial effusion   - Denies chest pain, palpitations, chest tightness or discomfort, shortness of breath or dyspnea   - Repeat TTE in 1 month for further evaluation   - Monitor on tele  - Cardio follow up     Pleural effusion   - CT Chest w/ small B/L pleural effusions  - On RA; No respiratory complaints at present  - Monitor O2 saturation; Supplement to maintain > 90%   - Diuresis on hold in view of Cr    Anemia   - Check anemia labs - Iron, B12, Folate, Ferritin   - On Hep gtt as above; Monitor   - Transfuse for Hgb < 7.5  - Maintain active T/S; Monitor H/H; Large bore IV access    Diabetes Mellitus   - A1C 11.6   - C/w Sliding scale, Lantus (If NPO, reduce Lantus)   - Diabetic, DASH Diet  - Monitor glucose levels; Adjust insulin regimen as tolerated   - ENDO eval recommended   - Patient will require outpatient Endo, Renal, Optho and Podiatry follow ups upon DC    HLD  - Check Lipid panel   - On Lipitor 40     HTN   - On Toprol XL as above   - On Dobutamine gtt at present   - Monitor BP, VS and adjust as tolerated; Reported to be hypotensive at OSH as per documentation     PPX    Patient seen and examined by me. patient care and plan discussed and reviewed with PA. Plan as outlined above edited by me to reflect our discussion. Advanced care planning/advanced directives discussed with patient/family. DNR status including forceful chest compressions to attempt to restart the heart, ventilator support/artificial breathing, electric shock, artificial nutrition, health care proxy, Molst form all discussed with pt. Sixty seven minutes of total time dedicated to this patient visit today including preparing to see the patient (eg. review of tests), obtaining and/or reviewing separately obtained history, obtaining/reviewing vitals, performing a medically appropriate examination and/or evaluation, counseling and educating the patient/family/caregiver, reviewing previous notes and test results, and procedures, communicating with other health professionals (when not separately reported), and documenting clinical information in the electronic health record.  Patient is a 64 year old Female, with a PMHx of of CHF (last TTE on 01/16/25 with EF of 30-35%, G2DD), DM, diabetic foot ulcer with a recent admission at [facility] for CHF exacerbation who presents to [facility] as a transfer for worsening right leg pain and fluid secretion. As per documentation, patient was reported to have severe depletion of RLE blood flow beyond the popliteal vessel at knee and similar findings in the left. Patient was transferred to [facility] for CO2 angiogram with Dr. Baltazar. Of note, patient with reported visual disturbance for which patient was seen and evaluated by opthalmology Internal Medicine has been consulted on Ms. Kirby's care for medical management.       CHF - HFrEF  - 1/16/2025 TTE with REDUCED EF of 30-35%, Grade II LVDD, Mild MR, Trace TR/ NH, Trace pericardial effusion  - NST - Abnormal, w/ small-sized, moderate defect(s) in apical wall that is predominantly fixed suggestive of an infarction with minimal marcus-infarct ischemia, post stress LV EF 25 %  - CT Chest w/ Small B/L pleural effusions and small pericardial effusion.  - GDMT as per Cardio - currently on Toprol XL 25 PO Qd (In view of JAMEL further medications currently held)   - On Dobutamine gtt    - Monitor I and O; Maintain O > I; Check daily weights   - Monitor volume status; Monitor electrolytes   - Monitor on tele   - Cardio eval appreciated; F/u recs    Diabetic Foot Ulcer   - RLE Arterial Duplex w/ Popliteal artery occlusion   - LLE Arterial Duplex w/ underlying disease cannot be excluded   - On Lipitor  - On Hep gtt; Monitor Ptt and adjust as per normogram; Monitor H/H and for gross bleeding  - Trend inflammatory markers   - Transferred to [facility] for CO2 angiogram with Vascular - Needs RENAL Clearance prior. Patient is deemed to be a high risk candidate. Cardiac clearance as per Cardiology.  - Vascular care appreciated; F/u recs    Leukocytosis   - Likely 2/2 LE   - 1/23 BCx2 negative; UCx w/ probable contamination   - On Amoxicillin from OSH   - Trend CBC, temp curve, VS and adjust as tolerated     JAMEL, Hyponatremia, Metabolic Acidosis   - As per OSH documentation, JAMEL was thought to be 2/2 to Unasyn/ Bumex / Entresto use and hypotension   - Check Bladder scan, noted   - Check Renal US noted   - Cr up-trending; Monitor for LILIAN   - Avoid nephrotoxic agents  - Monitor Cr and daily BMP; Avoid overcorrection of Na > 6-8 mEq in 24 hours   - Renal eval appreciated; F/u recs   - Albumin per renal   - improved renal function, monitor   - diuresis as per renal   - low bicarb noted   - ABG ordered     Visual Disturbance  - Consider CT Head   - Opthalmology eval appreciated; F/u recs    LE Edema  - W/ notable LE Edema on exam  - Diuresis on hold as per renal in view of Cr  - LE elevation; Compression  - Monitor     Pericardial effusion   - TTE w/ trace pericardial effusion   - CT Chest w/ small pericardial effusion   - Denies chest pain, palpitations, chest tightness or discomfort, shortness of breath or dyspnea   - Repeat TTE in 1 month for further evaluation   - Monitor on tele  - Cardio follow up     Pleural effusion   - CT Chest w/ small B/L pleural effusions  - On RA; No respiratory complaints at present  - Monitor O2 saturation; Supplement to maintain > 90%   - Diuresis on hold in view of Cr    Anemia   - Check anemia labs - Iron, B12, Folate, Ferritin   - On Hep gtt as above; Monitor   - Transfuse for Hgb < 7.5  - Maintain active T/S; Monitor H/H; Large bore IV access    Diabetes Mellitus   - A1C 11.6   - C/w Sliding scale, Lantus (If NPO, reduce Lantus)   - Diabetic, DASH Diet  - Monitor glucose levels; Adjust insulin regimen as tolerated   - ENDO eval recommended   - Patient will require outpatient Endo, Renal, Optho and Podiatry follow ups upon DC    HLD  - Check Lipid panel   - On Lipitor 40     HTN   - On Toprol XL as above   - On Dobutamine gtt at present   - Monitor BP, VS and adjust as tolerated; Reported to be hypotensive at OSH as per documentation     PPX    Patient seen and examined by me. patient care and plan discussed and reviewed with PA. Plan as outlined above edited by me to reflect our discussion. Advanced care planning/advanced directives discussed with patient/family. DNR status including forceful chest compressions to attempt to restart the heart, ventilator support/artificial breathing, electric shock, artificial nutrition, health care proxy, Molst form all discussed with pt. Sixty seven minutes of total time dedicated to this patient visit today including preparing to see the patient (eg. review of tests), obtaining and/or reviewing separately obtained history, obtaining/reviewing vitals, performing a medically appropriate examination and/or evaluation, counseling and educating the patient/family/caregiver, reviewing previous notes and test results, and procedures, communicating with other health professionals (when not separately reported), and documenting clinical information in the electronic health record.

## 2025-02-02 NOTE — PROGRESS NOTE ADULT - SUBJECTIVE AND OBJECTIVE BOX
[facility] NEPHROLOGY- PROGRESS NOTE    64y Female with history of CHF presents as a transfer for LE CO2 angiogram. Nephrology consulted for elevated Scr.    REVIEW OF SYSTEMS:  Gen: no fevers  Cards: no chest pain  Resp: + dyspnea with exertion, + cough  GI: no nausea and vomiting, no diarrhea  Vascular: + LE edema    No Known Allergies      Hospital Medications: Medications reviewed      VITALS:  T(F): 97.9 (02-02-25 @ 09:45), Max: 98 (02-01-25 @ 13:30)  HR: 99 (02-02-25 @ 09:45)  BP: 96/61 (02-02-25 @ 09:45)  RR: 18 (02-02-25 @ 09:45)  SpO2: 96% (02-02-25 @ 09:45)  Wt(kg): --    02-01 @ 07:01  -  02-02 @ 07:00  --------------------------------------------------------  IN: 1320.2 mL / OUT: 1350 mL / NET: -29.8 mL    02-02 @ 07:01  -  02-02 @ 10:06  --------------------------------------------------------  IN: 131.6 mL / OUT: 400 mL / NET: -268.4 mL        PHYSICAL EXAM:    Gen: NAD, calm  Cards: RRR, +S1/S2, no M/G/R  Resp: bibasilar rales  GI: soft, NT/ND, NABS  Vascular: 2+ RLE edema > LLE edema      LABS:  02-01    134[L]  |  96  |  62[H]  ----------------------------<  160[H]  4.4   |  19[L]  |  3.90[H]    Ca    9.0      01 Feb 2025 07:16  Phos  5.2     02-01  Mg     2.3     02-01      Creatinine Trend: 3.90 <--, 4.40 <--, 4.27 <--, 3.60 <--, 2.36 <--, 1.55 <--                        9.2    13.05 )-----------( 381      ( 02 Feb 2025 07:22 )             26.3     Urine Studies:  Urinalysis Basic - ( 01 Feb 2025 07:16 )    Color:  / Appearance:  / SG:  / pH:   Gluc: 160 mg/dL / Ketone:   / Bili:  / Urobili:    Blood:  / Protein:  / Nitrite:    Leuk Esterase:  / RBC:  / WBC    Sq Epi:  / Non Sq Epi:  / Bacteria:       Creatinine, Random Urine: 33 mg/dL (01-30 @ 20:41)  Protein/Creatinine Ratio Calculation: 1.1 Ratio (01-30 @ 20:41)  Creatinine, Random Urine: 102 mg/dL (01-29 @ 11:53)  Sodium, Random Urine: 10 mmol/L (01-27 @ 13:10)  Potassium, Random Urine: 43 mmol/L (01-27 @ 13:10)  Creatinine, Random Urine: 122 mg/dL (01-27 @ 13:10)  Protein/Creatinine Ratio Calculation: 0.2 Ratio (01-27 @ 13:10)  Calcium, Random Urine: 1.0 mg/dL (01-27 @ 13:10)

## 2025-02-02 NOTE — PROGRESS NOTE ADULT - ASSESSMENT
64y Female with history of CHF presents as a transfer for LE CO2 angiogram. Nephrology consulted for elevated Scr.    1) JAMEL: likely due to ATN in setting of infection/hypotension. Scr improving. Follow up labs this morning. UA relatively bland. FeUrea low consistent with low flow state. Renal US unremarkable. Plan to decrease  gtt to 2.5 mcg as per cardiology but would continue with IV albumin. Avoid nephrotoxins.    2) HTN: BP low normal. Metoprolol as per cardiology. Holding ARNI.    3) LE edema: Not secondary to nephrotic syndrome given subnephrotic range proteinuria. S/P lasix 80 mg IV X 1 dose on . Will give another dose today pending lab results. TTE with moderately decreased LVSF. Monitor UO.    4) Hyperphosphatemia: Improving. Continue with renal diet. Defer binders.      [facility] NEPHROLOGY  Raji Waterman M.D.  Mars Feliz D.O.  Kelley Parks M.D.  MD Nancy Kulkarni, MSN, ANP-C    Telephone: [phone]  Facsimile: [phone] [place] [address], [address]  [place] [address]

## 2025-02-02 NOTE — PROGRESS NOTE ADULT - SUBJECTIVE AND OBJECTIVE BOX
MR#[id number]  PATIENT NAME:COLE ALBA    DATE OF SERVICE: 02-02-25 @ 0645  Patient was seen and examined by Yordy Gilmore MD on    02-02-25 @ 0645  Interim events noted.Consultant notes ,Labs,Telemetry reviewed by me       HOSPITAL COURSE: HPI:  64F, hx of CHF (last TTE on 1/16/25 EF 30-35%, G2DD), DM, diabetic foot ulcer with a recent admission at [facility] for CHF exacerbation presented as a transfer for worsening R. leg pain and fluid secretion, On non-invasive imaging demonstrating severe depletion of RLE blood flow beyond the popliteal vessel at knee and similar findings in L. Was transferred to [facility] for CO2 angiogram with Dr. Baltazar. (29 Jan 2025 20:05)      INTERIM EVENTS:Patient seen at bedside ,interim events noted.      PMH -reviewed admission note, no change since admission  HEART FAILURE: Acute[ ]Chronic[ ] Systolic[ ] Diastolic[ ] Combined Systolic and Diastolic[ ]  CAD[ ] CABG[ ] PCI[ ]  DEVICES[ ] PPM[ ] ICD[ ] ILR[ ]  ATRIAL FIBRILLATION[ ] Paroxysmal[ ] Permanent[ ] CHADS2-[  ]  JAMEL[ ] CKD1[ ] CKD2[ ] CKD3[ ] CKD4[ ] ESRD[ ]  COPD[ ] HTN[ ]   DM[ ] Type1[ ] Type 2[ ]   CVA[ ] Paresis[ ]    AMBULATION: Assisted[ ] Cane/walker[ ] Independent[ ]    MEDICATIONS  (STANDING):  acetaminophen     Tablet .. 650 milliGRAM(s) Oral every 8 hours  albumin human 25% IVPB 50 milliLiter(s) IV Intermittent every 6 hours  ampicillin/sulbactam  IVPB 3 Gram(s) IV Intermittent daily  ampicillin/sulbactam  IVPB      aspirin enteric coated 81 milliGRAM(s) Oral daily  atorvastatin 40 milliGRAM(s) Oral at bedtime  benzocaine/menthol Lozenge 1 Lozenge Oral once  dextrose 5%. 1000 milliLiter(s) (100 mL/Hr) IV Continuous <Continuous>  dextrose 5%. 1000 milliLiter(s) (50 mL/Hr) IV Continuous <Continuous>  dextrose 50% Injectable 25 Gram(s) IV Push once  dextrose 50% Injectable 12.5 Gram(s) IV Push once  dextrose 50% Injectable 25 Gram(s) IV Push once  DOBUTamine Infusion 5 MICROgram(s)/kG/Min (10.8 mL/Hr) IV Continuous <Continuous>  glucagon  Injectable 1 milliGRAM(s) IntraMuscular once  heparin  Infusion 14 Unit(s)/Hr (15 mL/Hr) IV Continuous <Continuous>  insulin glargine Injectable (LANTUS) 16 Unit(s) SubCutaneous at bedtime  insulin lispro (ADMELOG) corrective regimen sliding scale   SubCutaneous three times a day before meals  insulin lispro (ADMELOG) corrective regimen sliding scale   SubCutaneous at bedtime  metoprolol succinate ER 25 milliGRAM(s) Oral daily  polyethylene glycol 3350 17 Gram(s) Oral daily  senna 2 Tablet(s) Oral at bedtime    MEDICATIONS  (PRN):  dextrose Oral Gel 15 Gram(s) Oral once PRN Blood Glucose LESS THAN 70 milliGRAM(s)/deciliter  melatonin 3 milliGRAM(s) Oral at bedtime PRN Insomnia  ondansetron Injectable 4 milliGRAM(s) IV Push every 6 hours PRN Nausea and/or Vomiting  oxyCODONE    IR 5 milliGRAM(s) Oral every 6 hours PRN Severe Pain (7 - 10)            REVIEW OF SYSTEMS:  Constitutional: [ ] fever, [ ]weight loss,  [ ]fatigue [ ]weight gain  Eyes: [ ] visual changes  Respiratory: [ ]shortness of breath;  [ ] cough, [ ]wheezing, [ ]chills, [ ]hemoptysis  Cardiovascular: [ ] chest pain, [ ]palpitations, [ ]dizziness,  [ ]leg swelling[ ]orthopnea[ ]PND  Gastrointestinal: [ ] abdominal pain, [ ]nausea, [ ]vomiting,  [ ]diarrhea [ ]Constipation [ ]Melena  Genitourinary: [ ] dysuria, [ ] hematuria [ ][place]  Neurologic: [ ] headaches [ ] tremors[ ]weakness [ ]Paralysis Right[ ] Left[ ]  Skin: [ ] itching, [ ]burning, [ ] rashes  Endocrine: [ ] heat or cold intolerance  Musculoskeletal: [ ] joint pain or swelling; [ ] muscle, back, or extremity pain  Psychiatric: [ ] depression, [ ]anxiety, [ ]mood swings, or [ ]difficulty sleeping  Hematologic: [ ] easy bruising, [ ] bleeding gums    [ ] All remaining systems negative except as per above.   [ ]Unable to obtain.  [x] No change in ROS since admission      Vital Signs Last 24 Hrs  T(C): 37.4 (02 Feb 2025 21:17), Max: 37.4 (02 Feb 2025 21:17)  T(F): 99.4 (02 Feb 2025 21:17), Max: 99.4 (02 Feb 2025 21:17)  HR: 116 (02 Feb 2025 21:17) (98 - 118)  BP: 125/71 (02 Feb 2025 21:17) (96/61 - 125/71)  BP(mean): --  RR: 18 (02 Feb 2025 21:17) (18 - 18)  SpO2: 94% (02 Feb 2025 21:17) (94% - 97%)    Parameters below as of 02 Feb 2025 21:17  Patient On (Oxygen Delivery Method): room air      I&O's Summary    01 Feb 2025 07:01  -  02 Feb 2025 07:00  --------------------------------------------------------  IN: 1320.2 mL / OUT: 1350 mL / NET: -29.8 mL    02 Feb 2025 07:01  -  02 Feb 2025 22:44  --------------------------------------------------------  IN: 759.6 mL / OUT: 700 mL / NET: 59.6 mL        PHYSICAL EXAM:  General: No acute distress BMI-  HEENT: EOMI, PERRL  Neck: Supple, [ ] JVD  Lungs: Equal air entry bilaterally; [ ] rales [ ] wheezing [ ] rhonchi  Heart: Regular rate and rhythm; [x ] murmur   2/6 [ x] systolic [ ] diastolic [ ] radiation[ ] rubs [ ]  gallops  Abdomen: Nontender, bowel sounds present  Extremities: No clubbing, cyanosis, [ ] edema [ ]Pulses  equal and intact  Nervous system:  Alert & Oriented X3, no focal deficits  Psychiatric: Normal affect  Skin: No rashes or lesions    LABS:  02-02    130[L]  |  93[L]  |  54[H]  ----------------------------<  253[H]  4.8   |  13[L]  |  2.65[H]    Ca    9.1      02 Feb 2025 09:46  Phos  4.7     02-02  Mg     2.2     02-02      Creatinine Trend: 2.65<--, 3.90<--, 4.40<--, 4.27<--, 3.60<--, 2.36<--                        9.2    13.05 )-----------( 381      ( 02 Feb 2025 07:22 )             26.3     PTT - ( 02 Feb 2025 07:28 )  PTT:97.4 sec

## 2025-02-03 DIAGNOSIS — E78.5 HYPERLIPIDEMIA, UNSPECIFIED: ICD-10-CM

## 2025-02-03 DIAGNOSIS — E11.65 TYPE 2 DIABETES MELLITUS WITH HYPERGLYCEMIA: ICD-10-CM

## 2025-02-03 DIAGNOSIS — I10 ESSENTIAL (PRIMARY) HYPERTENSION: ICD-10-CM

## 2025-02-03 LAB
ANION GAP SERPL CALC-SCNC: 16 MMOL/L — SIGNIFICANT CHANGE UP (ref 5–17)
ANISOCYTOSIS BLD QL: SLIGHT — SIGNIFICANT CHANGE UP
APTT BLD: 172.4 SEC — CRITICAL HIGH (ref 24.5–35.6)
APTT BLD: 173.3 SEC — CRITICAL HIGH (ref 24.5–35.6)
B-OH-BUTYR SERPL-SCNC: 0.2 MMOL/L — SIGNIFICANT CHANGE UP
BASE EXCESS BLDV CALC-SCNC: -0.9 MMOL/L — SIGNIFICANT CHANGE UP (ref -2–3)
BASOPHILS # BLD AUTO: 0.09 K/UL — SIGNIFICANT CHANGE UP (ref 0–0.2)
BASOPHILS NFR BLD AUTO: 0.9 % — SIGNIFICANT CHANGE UP (ref 0–2)
BUN SERPL-MCNC: 50 MG/DL — HIGH (ref 7–23)
CALCIUM SERPL-MCNC: 9.9 MG/DL — SIGNIFICANT CHANGE UP (ref 8.4–10.5)
CHLORIDE SERPL-SCNC: 96 MMOL/L — SIGNIFICANT CHANGE UP (ref 96–108)
CO2 BLDV-SCNC: 27 MMOL/L — HIGH (ref 22–26)
CO2 SERPL-SCNC: 24 MMOL/L — SIGNIFICANT CHANGE UP (ref 22–31)
CREAT SERPL-MCNC: 3.31 MG/DL — HIGH (ref 0.5–1.3)
DACRYOCYTES BLD QL SMEAR: SLIGHT — SIGNIFICANT CHANGE UP
EGFR: 15 ML/MIN/1.73M2 — LOW
EGFR: 15 ML/MIN/1.73M2 — LOW
EOSINOPHIL # BLD AUTO: 0.25 K/UL — SIGNIFICANT CHANGE UP (ref 0–0.5)
EOSINOPHIL NFR BLD AUTO: 2.6 % — SIGNIFICANT CHANGE UP (ref 0–6)
GAS PNL BLDV: SIGNIFICANT CHANGE UP
GIANT PLATELETS BLD QL SMEAR: PRESENT — SIGNIFICANT CHANGE UP
GLUCOSE BLDC GLUCOMTR-MCNC: 116 MG/DL — HIGH (ref 70–99)
GLUCOSE BLDC GLUCOMTR-MCNC: 159 MG/DL — HIGH (ref 70–99)
GLUCOSE BLDC GLUCOMTR-MCNC: 166 MG/DL — HIGH (ref 70–99)
GLUCOSE BLDC GLUCOMTR-MCNC: 183 MG/DL — HIGH (ref 70–99)
GLUCOSE SERPL-MCNC: 113 MG/DL — HIGH (ref 70–99)
HCO3 BLDV-SCNC: 26 MMOL/L — SIGNIFICANT CHANGE UP (ref 22–29)
HCT VFR BLD CALC: 27.5 % — LOW (ref 34.5–45)
HGB BLD-MCNC: 9.5 G/DL — LOW (ref 11.5–15.5)
LACTATE SERPL-SCNC: 1.5 MMOL/L — SIGNIFICANT CHANGE UP (ref 0.5–2)
LYMPHOCYTES # BLD AUTO: 1.66 K/UL — SIGNIFICANT CHANGE UP (ref 1–3.3)
LYMPHOCYTES # BLD AUTO: 17.4 % — SIGNIFICANT CHANGE UP (ref 13–44)
MAGNESIUM SERPL-MCNC: 2.1 MG/DL — SIGNIFICANT CHANGE UP (ref 1.6–2.6)
MANUAL SMEAR VERIFICATION: SIGNIFICANT CHANGE UP
MCHC RBC-ENTMCNC: 24.8 PG — LOW (ref 27–34)
MCHC RBC-ENTMCNC: 34.5 G/DL — SIGNIFICANT CHANGE UP (ref 32–36)
MCV RBC AUTO: 71.8 FL — LOW (ref 80–100)
MONOCYTES # BLD AUTO: 0.66 K/UL — SIGNIFICANT CHANGE UP (ref 0–0.9)
MONOCYTES NFR BLD AUTO: 6.9 % — SIGNIFICANT CHANGE UP (ref 2–14)
NEUTROPHILS # BLD AUTO: 6.88 K/UL — SIGNIFICANT CHANGE UP (ref 1.8–7.4)
NEUTROPHILS NFR BLD AUTO: 72.2 % — SIGNIFICANT CHANGE UP (ref 43–77)
OVALOCYTES BLD QL SMEAR: SLIGHT — SIGNIFICANT CHANGE UP
PCO2 BLDV: 50 MMHG — HIGH (ref 39–42)
PH BLDV: 7.32 — SIGNIFICANT CHANGE UP (ref 7.32–7.43)
PHOSPHATE SERPL-MCNC: 4.1 MG/DL — SIGNIFICANT CHANGE UP (ref 2.5–4.5)
PLAT MORPH BLD: ABNORMAL
PLATELET # BLD AUTO: 382 K/UL — SIGNIFICANT CHANGE UP (ref 150–400)
PO2 BLDV: 38 MMHG — SIGNIFICANT CHANGE UP (ref 25–45)
POIKILOCYTOSIS BLD QL AUTO: SLIGHT — SIGNIFICANT CHANGE UP
POLYCHROMASIA BLD QL SMEAR: SLIGHT — SIGNIFICANT CHANGE UP
POTASSIUM SERPL-MCNC: 4 MMOL/L — SIGNIFICANT CHANGE UP (ref 3.5–5.3)
POTASSIUM SERPL-SCNC: 4 MMOL/L — SIGNIFICANT CHANGE UP (ref 3.5–5.3)
RBC # BLD: 3.83 M/UL — SIGNIFICANT CHANGE UP (ref 3.8–5.2)
RBC # FLD: 15.6 % — HIGH (ref 10.3–14.5)
RBC BLD AUTO: ABNORMAL
SAO2 % BLDV: 62.9 % — LOW (ref 67–88)
SCHISTOCYTES BLD QL AUTO: SLIGHT — SIGNIFICANT CHANGE UP
SODIUM SERPL-SCNC: 136 MMOL/L — SIGNIFICANT CHANGE UP (ref 135–145)
TARGETS BLD QL SMEAR: SIGNIFICANT CHANGE UP
WBC # BLD: 9.53 K/UL — SIGNIFICANT CHANGE UP (ref 3.8–10.5)
WBC # FLD AUTO: 9.53 K/UL — SIGNIFICANT CHANGE UP (ref 3.8–10.5)

## 2025-02-03 PROCEDURE — 99223 1ST HOSP IP/OBS HIGH 75: CPT

## 2025-02-03 PROCEDURE — 71045 X-RAY EXAM CHEST 1 VIEW: CPT | Mod: 26

## 2025-02-03 RX ORDER — DOBUTAMINE 250 MG/20ML
2.5 INJECTION INTRAVENOUS
Qty: 500 | Refills: 0 | Status: DISCONTINUED | OUTPATIENT
Start: 2025-02-03 | End: 2025-02-07

## 2025-02-03 RX ORDER — INSULIN GLARGINE-YFGN 100 [IU]/ML
18 INJECTION, SOLUTION SUBCUTANEOUS AT BEDTIME
Refills: 0 | Status: DISCONTINUED | OUTPATIENT
Start: 2025-02-03 | End: 2025-02-10

## 2025-02-03 RX ORDER — ALBUMIN (HUMAN) 12.5 G/50ML
50 INJECTION, SOLUTION INTRAVENOUS EVERY 6 HOURS
Refills: 0 | Status: DISCONTINUED | OUTPATIENT
Start: 2025-02-03 | End: 2025-02-06

## 2025-02-03 RX ORDER — HEPARIN SODIUM 1000 [USP'U]/ML
1300 INJECTION INTRAVENOUS; SUBCUTANEOUS
Qty: 25000 | Refills: 0 | Status: DISCONTINUED | OUTPATIENT
Start: 2025-02-03 | End: 2025-02-03

## 2025-02-03 RX ORDER — HEPARIN SODIUM 1000 [USP'U]/ML
1100 INJECTION INTRAVENOUS; SUBCUTANEOUS
Qty: 25000 | Refills: 0 | Status: DISCONTINUED | OUTPATIENT
Start: 2025-02-03 | End: 2025-02-05

## 2025-02-03 RX ORDER — ACETAMINOPHEN 500 MG/5ML
650 LIQUID (ML) ORAL EVERY 6 HOURS
Refills: 0 | Status: DISCONTINUED | OUTPATIENT
Start: 2025-02-03 | End: 2025-03-17

## 2025-02-03 RX ADMIN — HEPARIN SODIUM 11 UNIT(S)/HR: 1000 INJECTION INTRAVENOUS; SUBCUTANEOUS at 19:43

## 2025-02-03 RX ADMIN — HEPARIN SODIUM 13 UNIT(S)/HR: 1000 INJECTION INTRAVENOUS; SUBCUTANEOUS at 16:17

## 2025-02-03 RX ADMIN — INSULIN LISPRO 1: 100 INJECTION, SOLUTION INTRAVENOUS; SUBCUTANEOUS at 10:02

## 2025-02-03 RX ADMIN — Medication 81 MILLIGRAM(S): at 13:01

## 2025-02-03 RX ADMIN — ALBUMIN (HUMAN) 50 MILLILITER(S): 12.5 INJECTION, SOLUTION INTRAVENOUS at 05:49

## 2025-02-03 RX ADMIN — INSULIN LISPRO 1: 100 INJECTION, SOLUTION INTRAVENOUS; SUBCUTANEOUS at 13:10

## 2025-02-03 RX ADMIN — Medication 2 TABLET(S): at 21:04

## 2025-02-03 RX ADMIN — HEPARIN SODIUM 13 UNIT(S)/HR: 1000 INJECTION INTRAVENOUS; SUBCUTANEOUS at 09:10

## 2025-02-03 RX ADMIN — ALBUMIN (HUMAN) 50 MILLILITER(S): 12.5 INJECTION, SOLUTION INTRAVENOUS at 13:58

## 2025-02-03 RX ADMIN — AMPICILLIN SODIUM AND SULBACTAM SODIUM 200 GRAM(S): 1; .5 INJECTION, POWDER, FOR SOLUTION INTRAMUSCULAR; INTRAVENOUS at 13:05

## 2025-02-03 RX ADMIN — ALBUMIN (HUMAN) 50 MILLILITER(S): 12.5 INJECTION, SOLUTION INTRAVENOUS at 23:58

## 2025-02-03 RX ADMIN — Medication 40 MILLIGRAM(S): at 13:06

## 2025-02-03 RX ADMIN — HEPARIN SODIUM 15 UNIT(S)/HR: 1000 INJECTION INTRAVENOUS; SUBCUTANEOUS at 07:54

## 2025-02-03 RX ADMIN — INSULIN GLARGINE-YFGN 18 UNIT(S): 100 INJECTION, SOLUTION SUBCUTANEOUS at 22:49

## 2025-02-03 RX ADMIN — ATORVASTATIN CALCIUM 40 MILLIGRAM(S): 80 TABLET, FILM COATED ORAL at 21:04

## 2025-02-03 RX ADMIN — HEPARIN SODIUM 11 UNIT(S)/HR: 1000 INJECTION INTRAVENOUS; SUBCUTANEOUS at 17:25

## 2025-02-03 RX ADMIN — Medication 650 MILLIGRAM(S): at 19:50

## 2025-02-03 RX ADMIN — ALBUMIN (HUMAN) 50 MILLILITER(S): 12.5 INJECTION, SOLUTION INTRAVENOUS at 18:48

## 2025-02-03 RX ADMIN — Medication 650 MILLIGRAM(S): at 18:55

## 2025-02-03 RX ADMIN — HEPARIN SODIUM 15 UNIT(S)/HR: 1000 INJECTION INTRAVENOUS; SUBCUTANEOUS at 06:52

## 2025-02-03 RX ADMIN — DOBUTAMINE 5.41 MICROGRAM(S)/KG/MIN: 250 INJECTION INTRAVENOUS at 09:09

## 2025-02-03 NOTE — PROGRESS NOTE ADULT - ASSESSMENT
64y Female with history of CHF presents as a transfer for LE CO2 angiogram. Nephrology consulted for elevated Scr.    1) JAMEL: likely due to ATN in setting of infection/hypotension. Scr had been improving with acute rise this morning due to diuretics? Restart IV albumin and check bladder scan today. UA relatively bland. FeUrea low consistent with low flow state. Renal US unremarkable. Plan to decrease  gtt to 2.5 mcg as per cardiology. Avoid nephrotoxins.    2) HTN: BP low normal. Metoprolol as per cardiology. Holding ARNI.    3) LE edema: Not secondary to nephrotic syndrome given subnephrotic range proteinuria. S/P lasix 80 mg IV X 1 dose on  and . F/U CXR this morning. Will consider additional lasix pending results. TTE with moderately decreased LVSF. Monitor UO.    4) Hyperphosphatemia: Improving. Continue with renal diet.       [facility] NEPHROLOGY  Raji Waterman M.D.  Mars Feliz D.O.  Kelley Parks M.D.  MD Nancy Kulkarni, MSN, ANP-C    Telephone: [phone]  Facsimile: [phone] [address], [address]  [place] [address]

## 2025-02-03 NOTE — PROGRESS NOTE ADULT - ASSESSMENT
64F, hx of CHF (last TTE on 1/16/25 EF 30-35%, G2DD), DM, diabetic foot ulcer with a recent admission at [facility] for CHF exacerbation 1/14-1/17 p/w worsening R. leg pain and fluid secretion, was meeting sepsis criteria with podiatry having low suspicion for right cellulitis and admitted for continued management of HF exacerbation and needing ischemic eval.     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  -Left leg Arterial Duplex: Slightly tardus parvus waveform of the left popliteal artery is noted, for which underlying disease cannot be excluded. Posterior tibial artery waveform is nonpulsatile. Anterior tibial artery tardus parvus flow is noted.   Transferred to [facility] for CO2 angiogram as per vascular surgery    #  PAD Wound of lower extremity.   ·  Plan: Podiatry following, b/l serous bullae, no concerns for infection  Found to have RLE coolness  -Right Leg Arterial Duplex: Popliteal artery is occluded with negligible flow of right trifurcation arteries.  -Left leg Arterial Duplex: Slightly tardus parvus waveform of the left popliteal artery is noted, for which underlying disease cannot be excluded. Posterior tibial artery waveform is nonpulsatile. Anterior tibial artery tardus parvus flow is noted.  -Started on heparin gtt and dobutamine gtt -Will transfer to [facility] for CO2 angiogram as per vascular surgery as not candidate for CTA due to worsening SCr  -Keep compression dressing-LE elevation above heart level at rest.  -Transferred to [facility] for CO2 angiogram   - Overall this patient is at   intermediate  risk (for cardiac death, nonfatal myocardial infarction, and nonfatal cardiac arrest perioperatively for this intermediate  risk procedure).   No cardiac contraindications for CO2 vangiogram  There  are  no further recommendation for risk stratifying imaging/stress testing prior to planned surgery      # HFrEF (congestive heart failure).   ·  Plan: Hx of HF, previous admission in January, on home Lasix 40 qD, Metoprolol Succ 25 qD. Not on Entresto due to insurance issues  Last TTE: LVSF moderately decreased w/ EF 30-35 %. Moderate G2DD. Mild MR  Stress test: small-sized, moderate defect(s) in the apical wall that is predominantly fixed suggestive of an infarction with minimal marcus-infarct ischemia  Ischemic cardiomyopathy  GDMT on hold 2/2 JAMEL  Continue Dobutamine at present increased to 5 mcg/Kg/Min      # JAMEL  Creatinine Trend: 3.90<--4.40<--4.27<--3.60<--, 2.36<--, 1.55<--, 1.25<--, 1.25<--, 1.17<--  Cardiorenal   Renal consult noted  Creatinine downtrending hopefully will continue trend

## 2025-02-03 NOTE — CONSULT NOTE ADULT - ASSESSMENT
TOMASZ ALBA is a 64y old woman with history of Type 2 DM, HTN, HLD, CAD, PAD, transferred here for CO2 angiogram, endocrine consulted for Type 2 DM management in the setting of poorly controlled Type 2 DM.     1. Type 2 DM  A1C:A1C with Estimated Average Glucose Result: 11.6 % (01-24-25 @ 05:40)  A1C with Estimated Average Glucose Result: >15.5 % (12-13-24 @ 06:49)  Home regimen: Lantus 30 units, CeQur insulin patch about 2-10 units TID with meals   Endocrinologist: Dr Grace   EGFR: eGFR: 15 mL/min/1.73m2 (02-03-25 @ 07:11)  Discussed with patient the importance of Carb consistent diet and exercise as tolerated.    Recommend nutritional consultation  Recommend DM education through RN staff (see physical to RN order)  Discussed glycemic goal of a1c <7% to prevent microvascular complications of diabetes mellitus and reduce the risk of macrovascular complications.   Recommend annual podiatry and ophthalmology follow up.   Glucose goal of 80-180mg/dl while in patient.   Recommend Lantus  18 units at bedtime  Holding short acting insulin with meals due to poor appetite.    Recommend LDSS (1:50 above 150mg/dl) and night time low does sliding scale for hyperglycemia corrections    Discharge recommendation/considerations:  Likely basal bolus regimen given kidney function  Not a candidate for SGLT2 due to leg ulcers and also significantly decreased EGFR.     PLAN DISCUSSED WITH PRIMARY TEAM VIA [employer]/PHONE/EMAIL      2. HTN  BP goal <130/80  Management as per primary team as well as neprhology and cardiology team    3. HLD  LDL Goal <70mg/dl

## 2025-02-03 NOTE — PROGRESS NOTE ADULT - ASSESSMENT
64y.o. Female with PAD, CLTI affecting b/l LE, CHF, chronic b/l LE wound R worsen then the L, with R foot blistering and ulceration was transfer to [facility] for CO2 angiogram. Currently patient Cr is uptrending, not medically optimized for the procedure.     Recommendations:  - Patient ultrasound reviewed  - Severe bilateral below knee disease  - Continue ASA/statin  - Cardiology optimization and risk stratification documented  - Medical/nephrology optimization/risk stratification pending  - Vascular following    Vascular Surgery  [id number]

## 2025-02-03 NOTE — PROGRESS NOTE ADULT - SUBJECTIVE AND OBJECTIVE BOX
MR#[id number]  PATIENT NAME:COLE ALBA    DATE OF SERVICE: 02-03-25 @ 0930  Patient was seen and examined by Yordy Gilmore MD on    02-03-25 @ 0930  Interim events noted.Consultant notes ,Labs,Telemetry reviewed by me       HOSPITAL COURSE: HPI:  64F, hx of CHF (last TTE on 1/16/25 EF 30-35%, G2DD), DM, diabetic foot ulcer with a recent admission at [facility] for CHF exacerbation presented as a transfer for worsening R. leg pain and fluid secretion, On non-invasive imaging demonstrating severe depletion of RLE blood flow beyond the popliteal vessel at knee and similar findings in L. Was transferred to [facility] for CO2 angiogram with Dr. Baltazar. (29 Jan 2025 20:05)      INTERIM EVENTS:Patient seen at bedside ,interim events noted.      PMH -reviewed admission note, no change since admission  HEART FAILURE: Acute[ ]Chronic[ ] Systolic[ ] Diastolic[ ] Combined Systolic and Diastolic[ ]  CAD[ ] CABG[ ] PCI[ ]  DEVICES[ ] PPM[ ] ICD[ ] ILR[ ]  ATRIAL FIBRILLATION[ ] Paroxysmal[ ] Permanent[ ] CHADS2-[  ]  JAMEL[ ] CKD1[ ] CKD2[ ] CKD3[ ] CKD4[ ] ESRD[ ]  COPD[ ] HTN[ ]   DM[ ] Type1[ ] Type 2[ ]   CVA[ ] Paresis[ ]    AMBULATION: Assisted[ ] Cane/walker[ ] Independent[ ]    MEDICATIONS  (STANDING):  albumin human 25% IVPB 50 milliLiter(s) IV Intermittent every 6 hours  ampicillin/sulbactam  IVPB 3 Gram(s) IV Intermittent daily  ampicillin/sulbactam  IVPB      aspirin enteric coated 81 milliGRAM(s) Oral daily  atorvastatin 40 milliGRAM(s) Oral at bedtime  benzocaine/menthol Lozenge 1 Lozenge Oral once  dextrose 5%. 1000 milliLiter(s) (50 mL/Hr) IV Continuous <Continuous>  dextrose 5%. 1000 milliLiter(s) (100 mL/Hr) IV Continuous <Continuous>  dextrose 50% Injectable 25 Gram(s) IV Push once  dextrose 50% Injectable 12.5 Gram(s) IV Push once  dextrose 50% Injectable 25 Gram(s) IV Push once  DOBUTamine Infusion 2.5 MICROgram(s)/kG/Min (5.41 mL/Hr) IV Continuous <Continuous>  glucagon  Injectable 1 milliGRAM(s) IntraMuscular once  heparin  Infusion 1100 Unit(s)/Hr (11 mL/Hr) IV Continuous <Continuous>  insulin glargine Injectable (LANTUS) 18 Unit(s) SubCutaneous at bedtime  insulin lispro (ADMELOG) corrective regimen sliding scale   SubCutaneous three times a day before meals  insulin lispro (ADMELOG) corrective regimen sliding scale   SubCutaneous at bedtime  metoprolol succinate ER 25 milliGRAM(s) Oral daily  pantoprazole  Injectable 40 milliGRAM(s) IV Push daily  polyethylene glycol 3350 17 Gram(s) Oral daily  senna 2 Tablet(s) Oral at bedtime    MEDICATIONS  (PRN):  acetaminophen     Tablet .. 650 milliGRAM(s) Oral every 6 hours PRN Mild Pain (1 - 3)  dextrose Oral Gel 15 Gram(s) Oral once PRN Blood Glucose LESS THAN 70 milliGRAM(s)/deciliter  melatonin 3 milliGRAM(s) Oral at bedtime PRN Insomnia  ondansetron Injectable 4 milliGRAM(s) IV Push every 6 hours PRN Nausea and/or Vomiting  oxyCODONE    IR 5 milliGRAM(s) Oral every 6 hours PRN Severe Pain (7 - 10)            REVIEW OF SYSTEMS:  Constitutional: [ ] fever, [ ]weight loss,  [ ]fatigue [ ]weight gain  Eyes: [ ] visual changes  Respiratory: [ ]shortness of breath;  [ ] cough, [ ]wheezing, [ ]chills, [ ]hemoptysis  Cardiovascular: [ ] chest pain, [ ]palpitations, [ ]dizziness,  [ ]leg swelling[ ]orthopnea[ ]PND  Gastrointestinal: [ ] abdominal pain, [ ]nausea, [ ]vomiting,  [ ]diarrhea [ ]Constipation [ ]Melena  Genitourinary: [ ] dysuria, [ ] hematuria [ ][place]  Neurologic: [ ] headaches [ ] tremors[ ]weakness [ ]Paralysis Right[ ] Left[ ]  Skin: [ ] itching, [ ]burning, [ ] rashes  Endocrine: [ ] heat or cold intolerance  Musculoskeletal: [ ] joint pain or swelling; [ ] muscle, back, or extremity pain  Psychiatric: [ ] depression, [ ]anxiety, [ ]mood swings, or [ ]difficulty sleeping  Hematologic: [ ] easy bruising, [ ] bleeding gums    [ ] All remaining systems negative except as per above.   [ ]Unable to obtain.  [x] No change in ROS since admission      Vital Signs Last 24 Hrs  T(C): 36.8 (03 Feb 2025 21:48), Max: 37.2 (03 Feb 2025 16:58)  T(F): 98.2 (03 Feb 2025 21:48), Max: 98.9 (03 Feb 2025 16:58)  HR: 98 (03 Feb 2025 21:48) (98 - 105)  BP: 109/67 (03 Feb 2025 21:48) (105/62 - 122/72)  BP(mean): --  RR: 18 (03 Feb 2025 21:48) (18 - 18)  SpO2: 95% (03 Feb 2025 21:48) (93% - 95%)    Parameters below as of 03 Feb 2025 21:48  Patient On (Oxygen Delivery Method): room air      I&O's Summary    02 Feb 2025 07:01  -  03 Feb 2025 07:00  --------------------------------------------------------  IN: 1219.2 mL / OUT: 1600 mL / NET: -380.8 mL    03 Feb 2025 07:01  -  03 Feb 2025 22:33  --------------------------------------------------------  IN: 379 mL / OUT: 300 mL / NET: 79 mL        PHYSICAL EXAM:  General: No acute distress BMI-  HEENT: EOMI, PERRL  Neck: Supple, [ ] JVD  Lungs: Equal air entry bilaterally; [ ] rales [ ] wheezing [ ] rhonchi  Heart: Regular rate and rhythm; [x ] murmur   2/6 [ x] systolic [ ] diastolic [ ] radiation[ ] rubs [ ]  gallops  Abdomen: Nontender, bowel sounds present  Extremities: No clubbing, cyanosis, [ ] edema [ ]Pulses  equal and intact  Nervous system:  Alert & Oriented X3, no focal deficits  Psychiatric: Normal affect  Skin: No rashes or lesions    LABS:  02-03    136  |  96  |  50[H]  ----------------------------<  113[H]  4.0   |  24  |  3.31[H]    Ca    9.9      03 Feb 2025 07:11  Phos  4.1     02-03  Mg     2.1     02-03      Creatinine Trend: 3.31<--, 2.65<--, 3.90<--, 4.40<--, 4.27<--, 3.60<--                        9.5    9.53  )-----------( 382      ( 03 Feb 2025 07:11 )             27.5     PTT - ( 03 Feb 2025 15:27 )  PTT:172.4 sec

## 2025-02-03 NOTE — PROGRESS NOTE ADULT - SUBJECTIVE AND OBJECTIVE BOX
Name of Patient : TOMASZ ALBA  MRN: [id number]  Date of visit: 02-03-25 @ 15:42      Subjective: Patient seen and examined. No new events except as noted.   Patient seen earlier this AM. Sitting OOB TC. Renal at the bedside.   Reports epigastric "acidic" discomfort. PPI ordered. Passing flatus. Has not had a BM X 2 days.     REVIEW OF SYSTEMS:    CONSTITUTIONAL: Generalized weakness, No fevers or chills  EYES/ENT: No visual changes;  No vertigo or throat pain   NECK: No pain or stiffness  RESPIRATORY: No cough, wheezing, hemoptysis; No shortness of breath  CARDIOVASCULAR: No chest pain or palpitations  GASTROINTESTINAL: + Acid reflux; + Constipation; No nausea, vomiting, or hematemesis  GENITOURINARY: No dysuria, frequency or hematuria  NEUROLOGICAL: No numbness or weakness  SKIN: LE wounds    All other review of systems is negative unless indicated above.    MEDICATIONS:  MEDICATIONS  (STANDING):  albumin human 25% IVPB 50 milliLiter(s) IV Intermittent every 6 hours  ampicillin/sulbactam  IVPB 3 Gram(s) IV Intermittent daily  ampicillin/sulbactam  IVPB      aspirin enteric coated 81 milliGRAM(s) Oral daily  atorvastatin 40 milliGRAM(s) Oral at bedtime  benzocaine/menthol Lozenge 1 Lozenge Oral once  dextrose 5%. 1000 milliLiter(s) (100 mL/Hr) IV Continuous <Continuous>  dextrose 5%. 1000 milliLiter(s) (50 mL/Hr) IV Continuous <Continuous>  dextrose 50% Injectable 25 Gram(s) IV Push once  dextrose 50% Injectable 12.5 Gram(s) IV Push once  dextrose 50% Injectable 25 Gram(s) IV Push once  DOBUTamine Infusion 2.5 MICROgram(s)/kG/Min (5.41 mL/Hr) IV Continuous <Continuous>  glucagon  Injectable 1 milliGRAM(s) IntraMuscular once  heparin  Infusion 1300 Unit(s)/Hr (13 mL/Hr) IV Continuous <Continuous>  insulin glargine Injectable (LANTUS) 16 Unit(s) SubCutaneous at bedtime  insulin lispro (ADMELOG) corrective regimen sliding scale   SubCutaneous three times a day before meals  insulin lispro (ADMELOG) corrective regimen sliding scale   SubCutaneous at bedtime  metoprolol succinate ER 25 milliGRAM(s) Oral daily  pantoprazole  Injectable 40 milliGRAM(s) IV Push daily  polyethylene glycol 3350 17 Gram(s) Oral daily  senna 2 Tablet(s) Oral at bedtime      PHYSICAL EXAM:  T(C): 36.8 (02-03-25 @ 13:30), Max: 37.4 (02-02-25 @ 21:17)  HR: 102 (02-03-25 @ 13:30) (102 - 118)  BP: 105/62 (02-03-25 @ 13:30) (105/62 - 125/71)  RR: 18 (02-03-25 @ 13:30) (18 - 18)  SpO2: 93% (02-03-25 @ 13:30) (93% - 95%)  Wt(kg): --  I&O's Summary    02 Feb 2025 07:01  -  03 Feb 2025 07:00  --------------------------------------------------------  IN: 1219.2 mL / OUT: 1600 mL / NET: -380.8 mL    03 Feb 2025 07:01  -  03 Feb 2025 15:42  --------------------------------------------------------  IN: 0 mL / OUT: 300 mL / NET: -300 mL      Appearance: Awake, sitting OOB TC 	  HEENT:  Eyes are open   Lymphatic: No lymphadenopathy grossly   Cardiovascular: Normal    Respiratory: normal effort, clear  Gastrointestinal:  Soft, Non-tender  Skin: No rashes,  warm to touch  Psychiatry:  Mood & affect appropriate  Musculoskeletal: LE dressings; Edema       02-02-25 @ 07:01  -  02-03-25 @ 07:00  --------------------------------------------------------  IN: 1219.2 mL / OUT: 1600 mL / NET: -380.8 mL    02-03-25 @ 07:01  -  02-03-25 @ 15:42  --------------------------------------------------------  IN: 0 mL / OUT: 300 mL / NET: -300 mL                              9.5    9.53  )-----------( 382      ( 03 Feb 2025 07:11 )             27.5               02-03    136  |  96  |  50[H]  ----------------------------<  113[H]  4.0   |  24  |  3.31[H]    Ca    9.9      03 Feb 2025 07:11  Phos  4.1     02-03  Mg     2.1     02-03      PTT - ( 03 Feb 2025 07:11 )  PTT:173.3 sec                   Urinalysis Basic - ( 03 Feb 2025 07:11 )    Color: x / Appearance: x / SG: x / pH: x  Gluc: 113 mg/dL / Ketone: x  / Bili: x / Urobili: x   Blood: x / Protein: x / Nitrite: x   Leuk Esterase: x / RBC: x / WBC x   Sq Epi: x / Non Sq Epi: x / Bacteria: x        < from: CT Head No Cont (01.30.25 @ 15:57) >    Impression: Old infarct as described above.    < end of copied text >

## 2025-02-03 NOTE — PROGRESS NOTE ADULT - SUBJECTIVE AND OBJECTIVE BOX
Vascular Surgery Daily Progress Note  =====================================================    SUBJECTIVE: Patient reports that they're feeling well. Reports not sleeping well, but pain in the BL LE ins controlled.     --------------------------------------------------------------------------------------  VITAL SIGNS:  T(C): 36.8 (02-03-25 @ 04:50), Max: 37.4 (02-02-25 @ 21:17)  HR: 103 (02-03-25 @ 04:50) (99 - 118)  BP: 110/67 (02-03-25 @ 04:50) (96/61 - 125/71)  RR: 18 (02-03-25 @ 04:50) (18 - 18)  SpO2: 95% (02-03-25 @ 04:50) (94% - 96%)  --------------------------------------------------------------------------------------    EXAM    General: NAD, resting in bed comfortably  Cardiac: Regular rate, normotensive  Respiratory: Nonlabored respirations, normal cw expansion, on RA  Neuro: AOx3, CNII-XII intact on gross examination  Vascular/Extremities: Warm, well perfused        RLE: Dressings in place, blistering and ulceration on the dorsal aspect of the foot        LLE: First digit dry gangrene on the plantar aspect    --------------------------------------------------------------------------------------    IMAGING:

## 2025-02-03 NOTE — CONSULT NOTE ADULT - SUBJECTIVE AND OBJECTIVE BOX
HPI:  HPI:  64F, hx of CHF (last TTE on 1/16/25 EF 30-35%, G2DD), DM, diabetic foot ulcer with a recent admission at [facility] for CHF exacerbation presented as a transfer for worsening R. leg pain and fluid secretion, On non-invasive imaging demonstrating severe depletion of RLE blood flow beyond the popliteal vessel at knee and similar findings in L. Was transferred to [facility] for CO2 angiogram with Dr. Baltazar. (29 Jan 2025 20:05)        PAST MEDICAL & SURGICAL HISTORY:  DM (diabetes mellitus)  Diabetic foot ulcer  Congestive heart failure (CHF)  CAD (coronary artery disease)  Cataract of both eyes, unspecified cataract type  History of cholecystectomy    FAMILY HISTORY:  Family history of CHF (congestive heart failure) (Mother)        Social History:    Outpatient Medications:  Home Medications:  acetaminophen 500 mg oral tablet: 2 tab(s) orally every 8 hours As needed Moderate Pain (4 - 6) (28 Jan 2025 12:01)  Admelog 100 units/mL injectable solution: injectable 4 times a day (with meals and at bedtime) Subcutaneous 3 times a day before meals AND at bedtime  1 unit if Glucose 151-200  2 unit if Glucose 201-250  3 unit if Glucose 251-300  4 unit if Glucose 301-350  5 unit if Glucose 351-400  6 units if Glucose &gt; 400 + Contact MD (28 Jan 2025 13:38)  amoxicillin-clavulanate 500 mg-125 mg oral tablet: 1 tab(s) orally every 12 hours (28 Jan 2025 15:30)  aspirin 81 mg oral tablet, chewable: 1 tab(s) orally once a day (28 Jan 2025 12:01)  atorvastatin 40 mg oral tablet: 1 tab(s) orally once a day (at bedtime) (28 Jan 2025 12:01)  DOBUTamine: now 500 mg in dextrose 5% 250 mL, infuse at 5.87 mL/Hr  Dose Rate: 2.5 Microgram/kg/Min (28 Jan 2025 13:38)  heparin: prn 6500 UNITS iv push, every 6 hours, PRN for aPTT less than 40 (28 Jan 2025 13:38)  heparin: prn 3000 units, IV Push, every 6 hours, PRN for aPTT between 40-57 (28 Jan 2025 13:38)  heparin 100 units/mL-D5% intravenous solution: intravenous now 47556 UNITS IN DEXTROSE 5% 250 ML, INFUSE AT 14 ML/HR  TARGET APTT = 58-99 SECONDS  USE FULL ANTICOAGULATION NOMOGRAM (28 Jan 2025 13:38)  insulin glargine 100 units/mL subcutaneous solution: 16 unit(s) subcutaneous once a day (at bedtime) (29 Jan 2025 13:35)  melatonin 3 mg oral tablet: 1 tab(s) orally once a day (at bedtime) As needed Insomnia (28 Jan 2025 12:01)  metoprolol succinate 25 mg oral tablet, extended release: 1 tab(s) orally once a day (28 Jan 2025 12:01)  ondansetron 2 mg/mL injectable solution: 4 milligram(s) injectable every 8 hours as needed for  nausea (28 Jan 2025 12:01)  oxyCODONE 5 mg oral tablet: 1 tab(s) orally every 6 hours As needed Severe Pain (7 - 10) (29 Jan 2025 13:35)  polyethylene glycol 3350 oral powder for reconstitution: 17 gram(s) orally once a day (28 Jan 2025 12:01)  senna leaf extract oral tablet: 2 tab(s) orally once a day (at bedtime) (28 Jan 2025 12:01)        MEDICATIONS  (STANDING):  albumin human 25% IVPB 50 milliLiter(s) IV Intermittent every 6 hours  ampicillin/sulbactam  IVPB 3 Gram(s) IV Intermittent daily  ampicillin/sulbactam  IVPB      aspirin enteric coated 81 milliGRAM(s) Oral daily  atorvastatin 40 milliGRAM(s) Oral at bedtime  benzocaine/menthol Lozenge 1 Lozenge Oral once  dextrose 5%. 1000 milliLiter(s) (100 mL/Hr) IV Continuous <Continuous>  dextrose 5%. 1000 milliLiter(s) (50 mL/Hr) IV Continuous <Continuous>  dextrose 50% Injectable 25 Gram(s) IV Push once  dextrose 50% Injectable 12.5 Gram(s) IV Push once  dextrose 50% Injectable 25 Gram(s) IV Push once  DOBUTamine Infusion 2.5 MICROgram(s)/kG/Min (5.41 mL/Hr) IV Continuous <Continuous>  glucagon  Injectable 1 milliGRAM(s) IntraMuscular once  heparin  Infusion 1300 Unit(s)/Hr (13 mL/Hr) IV Continuous <Continuous>  insulin glargine Injectable (LANTUS) 16 Unit(s) SubCutaneous at bedtime  insulin lispro (ADMELOG) corrective regimen sliding scale   SubCutaneous three times a day before meals  insulin lispro (ADMELOG) corrective regimen sliding scale   SubCutaneous at bedtime  metoprolol succinate ER 25 milliGRAM(s) Oral daily  pantoprazole  Injectable 40 milliGRAM(s) IV Push daily  polyethylene glycol 3350 17 Gram(s) Oral daily  senna 2 Tablet(s) Oral at bedtime    MEDICATIONS  (PRN):  acetaminophen     Tablet .. 650 milliGRAM(s) Oral every 6 hours PRN Mild Pain (1 - 3)  dextrose Oral Gel 15 Gram(s) Oral once PRN Blood Glucose LESS THAN 70 milliGRAM(s)/deciliter  melatonin 3 milliGRAM(s) Oral at bedtime PRN Insomnia  ondansetron Injectable 4 milliGRAM(s) IV Push every 6 hours PRN Nausea and/or Vomiting  oxyCODONE    IR 5 milliGRAM(s) Oral every 6 hours PRN Severe Pain (7 - 10)      Allergies    No Known Allergies    Intolerances    Augmentin (Stomach Upset; Vomiting; Nausea)      atorvastatin   40 milliGRAM(s) Oral (02-02-25 @ 21:26)   40 milliGRAM(s) Oral (02-01-25 @ 21:20)    insulin glargine Injectable (LANTUS)   16 Unit(s) SubCutaneous (02-02-25 @ 21:25)   16 Unit(s) SubCutaneous (02-01-25 @ 21:19)    insulin lispro (ADMELOG) corrective regimen sliding scale   1 Unit(s) SubCutaneous (02-03-25 @ 13:10)   1 Unit(s) SubCutaneous (02-03-25 @ 10:02)   1 Unit(s) SubCutaneous (02-02-25 @ 17:19)   1 Unit(s) SubCutaneous (02-02-25 @ 12:19)   1 Unit(s) SubCutaneous (02-02-25 @ 09:22)   1 Unit(s) SubCutaneous (02-01-25 @ 17:51)        Review of Systems:  Constitutional: No fever  Eyes: No blurry vision  Neuro: No tremors  HEENT: No pain  Cardiovascular: No chest pain, palpitations  Respiratory: No SOB, no cough  GI: No nausea, vomiting, abdominal pain  : No dysuria  Skin: no rash  Psych: no depression  Endocrine: no polyuria, polydipsia  Hem/lymph: no swelling  Osteoporosis: no fractures    ALL OTHER SYSTEMS REVIEWED AND NEGATIVE    UNABLE TO OBTAIN    PHYSICAL EXAM:  -----------------------------  VITALS: T(C): 36.8 (02-03-25 @ 13:30)  T(F): 98.2 (02-03-25 @ 13:30), Max: 99.4 (02-02-25 @ 21:17)  HR: 102 (02-03-25 @ 13:30) (102 - 118)  BP: 105/62 (02-03-25 @ 13:30) (105/62 - 125/71)  RR:  (18 - 18)  SpO2:  (93% - 95%)  Wt(kg): --  GENERAL: NAD, well-groomed, well-developed  EYES: No proptosis, no lid lag, anicteric  HEENT:  Atraumatic, Normocephalic, moist mucous membranes  THYROID: Normal size, no palpable nodules  RESPIRATORY: Clear to auscultation bilaterally; No rales, rhonchi, wheezing, or rubs  CARDIOVASCULAR: Regular rate and rhythm; No murmurs; no peripheral edema  GI: Soft, nontender, non distended, normal bowel sounds  SKIN: Dry, intact, No rashes or lesions  MUSCULOSKELETAL: Full range of motion, normal strength  NEURO: sensation intact, extraocular movements intact, no tremor, normal reflexes  PSYCH: Alert and oriented x 3, normal affect, normal mood  CUSHING'S SIGNS: no striae    POCT Blood Glucose.: 159 mg/dL (02-03-25 @ 13:04)  POCT Blood Glucose.: 166 mg/dL (02-03-25 @ 09:53)  POCT Blood Glucose.: 185 mg/dL (02-02-25 @ 21:18)  POCT Blood Glucose.: 159 mg/dL (02-02-25 @ 17:17)  POCT Blood Glucose.: 175 mg/dL (02-02-25 @ 12:14)  POCT Blood Glucose.: 162 mg/dL (02-02-25 @ 08:29)  POCT Blood Glucose.: 182 mg/dL (02-01-25 @ 21:18)  POCT Blood Glucose.: 165 mg/dL (02-01-25 @ 17:11)  POCT Blood Glucose.: 199 mg/dL (02-01-25 @ 12:00)  POCT Blood Glucose.: 170 mg/dL (02-01-25 @ 07:48)  POCT Blood Glucose.: 182 mg/dL (01-31-25 @ 21:32)  POCT Blood Glucose.: 119 mg/dL (01-31-25 @ 17:22)                            9.5    9.53  )-----------( 382      ( 03 Feb 2025 07:11 )             27.5       02-03    136  |  96  |  50[H]  ----------------------------<  113[H]  4.0   |  24  |  3.31[H]    eGFR: 15[L]    Ca    9.9      02-03  Mg     2.1     02-03  Phos  4.1     02-03        Thyroid Function Tests:  01-24 @ 05:40 TSH 5.34 FreeT4 -- T3 -- Anti TPO -- Anti Thyroglobulin Ab -- TSI --      A1C with Estimated Average Glucose Result: 11.6 % (01-24-25 @ 05:40)  A1C with Estimated Average Glucose Result: >15.5 % (12-13-24 @ 06:49)      01-24 Chol 122 Direct LDL -- LDL calculated 66 HDL 39[L] Trig 89    Radiology:   ------------------------           HPI:  HPI:  64F, hx of CHF (last TTE on 1/16/25 EF 30-35%, G2DD), DM, diabetic foot ulcer with a recent admission at [facility] for CHF exacerbation presented as a transfer for worsening R. leg pain and fluid secretion, On non-invasive imaging demonstrating severe depletion of RLE blood flow beyond the popliteal vessel at knee and similar findings in L. Was transferred to [facility] for CO2 angiogram with Dr. Baltazar. (29 Jan 2025 20:05)    Patient reports that she was diagnosed with Type 2 DM for "many years".  Outpatient endocrinologist Dr. Grace.  Patient take insulin Lantus 30 units at bedtime.  She was using insulin patch, about 10 unit of short acting insulin via CeQur simplicity insulin patch.  She states that her glucose usually elevated.    DM complicated by neuropathy and diabetic foot ulcer.  She has history of diabetic retinopathy.  EGFR is reduced to 15 during this admission.   DM complicated by CAD, CHF as well.   She was on metformin before but no longer taking.  She was also on Ozempic in the past but states that she's no longer taking it.   She reports very poor appetite.  Glucose has been mostly at goal while in-patient this visit.     PAST MEDICAL & SURGICAL HISTORY:  DM (diabetes mellitus)  Diabetic foot ulcer  Congestive heart failure (CHF)  CAD (coronary artery disease)  Cataract of both eyes, unspecified cataract type  History of cholecystectomy    FAMILY HISTORY:  Family history of CHF (congestive heart failure) (Mother)    Social History:  No smoking  No alcohol  No drugs     Outpatient Medications:  Home Medications:  acetaminophen 500 mg oral tablet: 2 tab(s) orally every 8 hours As needed Moderate Pain (4 - 6) (28 Jan 2025 12:01)  Admelog 100 units/mL injectable solution: injectable 4 times a day (with meals and at bedtime) Subcutaneous 3 times a day before meals AND at bedtime  1 unit if Glucose 151-200  2 unit if Glucose 201-250  3 unit if Glucose 251-300  4 unit if Glucose 301-350  5 unit if Glucose 351-400  6 units if Glucose &gt; 400 + Contact MD (28 Jan 2025 13:38)  amoxicillin-clavulanate 500 mg-125 mg oral tablet: 1 tab(s) orally every 12 hours (28 Jan 2025 15:30)  aspirin 81 mg oral tablet, chewable: 1 tab(s) orally once a day (28 Jan 2025 12:01)  atorvastatin 40 mg oral tablet: 1 tab(s) orally once a day (at bedtime) (28 Jan 2025 12:01)  DOBUTamine: now 500 mg in dextrose 5% 250 mL, infuse at 5.87 mL/Hr  Dose Rate: 2.5 Microgram/kg/Min (28 Jan 2025 13:38)  heparin: prn 6500 UNITS iv push, every 6 hours, PRN for aPTT less than 40 (28 Jan 2025 13:38)  heparin: prn 3000 units, IV Push, every 6 hours, PRN for aPTT between 40-57 (28 Jan 2025 13:38)  heparin 100 units/mL-D5% intravenous solution: intravenous now 30171 UNITS IN DEXTROSE 5% 250 ML, INFUSE AT 14 ML/HR  TARGET APTT = 58-99 SECONDS  USE FULL ANTICOAGULATION NOMOGRAM (28 Jan 2025 13:38)  insulin glargine 100 units/mL subcutaneous solution: 16 unit(s) subcutaneous once a day (at bedtime) (29 Jan 2025 13:35)  melatonin 3 mg oral tablet: 1 tab(s) orally once a day (at bedtime) As needed Insomnia (28 Jan 2025 12:01)  metoprolol succinate 25 mg oral tablet, extended release: 1 tab(s) orally once a day (28 Jan 2025 12:01)  ondansetron 2 mg/mL injectable solution: 4 milligram(s) injectable every 8 hours as needed for  nausea (28 Jan 2025 12:01)  oxyCODONE 5 mg oral tablet: 1 tab(s) orally every 6 hours As needed Severe Pain (7 - 10) (29 Jan 2025 13:35)  polyethylene glycol 3350 oral powder for reconstitution: 17 gram(s) orally once a day (28 Jan 2025 12:01)  senna leaf extract oral tablet: 2 tab(s) orally once a day (at bedtime) (28 Jan 2025 12:01)        MEDICATIONS  (STANDING):  albumin human 25% IVPB 50 milliLiter(s) IV Intermittent every 6 hours  ampicillin/sulbactam  IVPB 3 Gram(s) IV Intermittent daily  ampicillin/sulbactam  IVPB      aspirin enteric coated 81 milliGRAM(s) Oral daily  atorvastatin 40 milliGRAM(s) Oral at bedtime  benzocaine/menthol Lozenge 1 Lozenge Oral once  dextrose 5%. 1000 milliLiter(s) (100 mL/Hr) IV Continuous <Continuous>  dextrose 5%. 1000 milliLiter(s) (50 mL/Hr) IV Continuous <Continuous>  dextrose 50% Injectable 25 Gram(s) IV Push once  dextrose 50% Injectable 12.5 Gram(s) IV Push once  dextrose 50% Injectable 25 Gram(s) IV Push once  DOBUTamine Infusion 2.5 MICROgram(s)/kG/Min (5.41 mL/Hr) IV Continuous <Continuous>  glucagon  Injectable 1 milliGRAM(s) IntraMuscular once  heparin  Infusion 1300 Unit(s)/Hr (13 mL/Hr) IV Continuous <Continuous>  insulin glargine Injectable (LANTUS) 16 Unit(s) SubCutaneous at bedtime  insulin lispro (ADMELOG) corrective regimen sliding scale   SubCutaneous three times a day before meals  insulin lispro (ADMELOG) corrective regimen sliding scale   SubCutaneous at bedtime  metoprolol succinate ER 25 milliGRAM(s) Oral daily  pantoprazole  Injectable 40 milliGRAM(s) IV Push daily  polyethylene glycol 3350 17 Gram(s) Oral daily  senna 2 Tablet(s) Oral at bedtime    MEDICATIONS  (PRN):  acetaminophen     Tablet .. 650 milliGRAM(s) Oral every 6 hours PRN Mild Pain (1 - 3)  dextrose Oral Gel 15 Gram(s) Oral once PRN Blood Glucose LESS THAN 70 milliGRAM(s)/deciliter  melatonin 3 milliGRAM(s) Oral at bedtime PRN Insomnia  ondansetron Injectable 4 milliGRAM(s) IV Push every 6 hours PRN Nausea and/or Vomiting  oxyCODONE    IR 5 milliGRAM(s) Oral every 6 hours PRN Severe Pain (7 - 10)      Allergies    No Known Allergies    Intolerances    Augmentin (Stomach Upset; Vomiting; Nausea)      atorvastatin   40 milliGRAM(s) Oral (02-02-25 @ 21:26)   40 milliGRAM(s) Oral (02-01-25 @ 21:20)    insulin glargine Injectable (LANTUS)   16 Unit(s) SubCutaneous (02-02-25 @ 21:25)   16 Unit(s) SubCutaneous (02-01-25 @ 21:19)    insulin lispro (ADMELOG) corrective regimen sliding scale   1 Unit(s) SubCutaneous (02-03-25 @ 13:10)   1 Unit(s) SubCutaneous (02-03-25 @ 10:02)   1 Unit(s) SubCutaneous (02-02-25 @ 17:19)   1 Unit(s) SubCutaneous (02-02-25 @ 12:19)   1 Unit(s) SubCutaneous (02-02-25 @ 09:22)   1 Unit(s) SubCutaneous (02-01-25 @ 17:51)        Review of Systems:  Constitutional: No fever  Eyes: No blurry vision  Neuro: No tremors  HEENT: No pain  Cardiovascular: No chest pain, palpitations  Respiratory: No SOB, no cough  GI: No nausea, vomiting, abdominal pain  : No dysuria  Skin: no rash  Psych: no depression  Endocrine: no polyuria, polydipsia  Hem/lymph: no swelling  Osteoporosis: no fractures    ALL OTHER SYSTEMS REVIEWED AND NEGATIVE    UNABLE TO OBTAIN    PHYSICAL EXAM:  -----------------------------  VITALS: T(C): 36.8 (02-03-25 @ 13:30)  T(F): 98.2 (02-03-25 @ 13:30), Max: 99.4 (02-02-25 @ 21:17)  HR: 102 (02-03-25 @ 13:30) (102 - 118)  BP: 105/62 (02-03-25 @ 13:30) (105/62 - 125/71)  RR:  (18 - 18)  SpO2:  (93% - 95%)  Wt(kg): --  GENERAL: NAD, well-groomed, well-developed  EYES: No proptosis, no lid lag, anicteric  HEENT:  Atraumatic, Normocephalic, moist mucous membranes  THYROID: Normal size, no palpable nodules  RESPIRATORY: Clear to auscultation bilaterally; No rales, rhonchi, wheezing, or rubs  CARDIOVASCULAR: Regular rate and rhythm; No murmurs; no peripheral edema  GI: Soft, nontender, non distended, normal bowel sounds  SKIN: Dry, intact, No rashes or lesions  MUSCULOSKELETAL: Full range of motion, normal strength  NEURO: sensation intact, extraocular movements intact, no tremor, normal reflexes  PSYCH: Alert and oriented x 3, normal affect, normal mood  CUSHING'S SIGNS: no striae    POCT Blood Glucose.: 159 mg/dL (02-03-25 @ 13:04)  POCT Blood Glucose.: 166 mg/dL (02-03-25 @ 09:53)  POCT Blood Glucose.: 185 mg/dL (02-02-25 @ 21:18)  POCT Blood Glucose.: 159 mg/dL (02-02-25 @ 17:17)  POCT Blood Glucose.: 175 mg/dL (02-02-25 @ 12:14)  POCT Blood Glucose.: 162 mg/dL (02-02-25 @ 08:29)  POCT Blood Glucose.: 182 mg/dL (02-01-25 @ 21:18)  POCT Blood Glucose.: 165 mg/dL (02-01-25 @ 17:11)  POCT Blood Glucose.: 199 mg/dL (02-01-25 @ 12:00)  POCT Blood Glucose.: 170 mg/dL (02-01-25 @ 07:48)  POCT Blood Glucose.: 182 mg/dL (01-31-25 @ 21:32)  POCT Blood Glucose.: 119 mg/dL (01-31-25 @ 17:22)                            9.5    9.53  )-----------( 382      ( 03 Feb 2025 07:11 )             27.5       02-03    136  |  96  |  50[H]  ----------------------------<  113[H]  4.0   |  24  |  3.31[H]    eGFR: 15[L]    Ca    9.9      02-03  Mg     2.1     02-03  Phos  4.1     02-03        Thyroid Function Tests:  01-24 @ 05:40 TSH 5.34 FreeT4 -- T3 -- Anti TPO -- Anti Thyroglobulin Ab -- TSI --      A1C with Estimated Average Glucose Result: 11.6 % (01-24-25 @ 05:40)  A1C with Estimated Average Glucose Result: >15.5 % (12-13-24 @ 06:49)      01-24 Chol 122 Direct LDL -- LDL calculated 66 HDL 39[L] Trig 89    Radiology:   ------------------------

## 2025-02-03 NOTE — PROGRESS NOTE ADULT - SUBJECTIVE AND OBJECTIVE BOX
[facility] NEPHROLOGY- PROGRESS NOTE    64y Female with history of CHF presents as a transfer for LE CO2 angiogram. Nephrology consulted for elevated Scr.    REVIEW OF SYSTEMS:  Gen: no fevers  Cards: no chest pain  Resp: + dyspnea with exertion, + cough  GI: + nausea and vomiting this morning, no diarrhea, + decreased PO intake  Vascular: + LE edema    No Known Allergies      Hospital Medications: Medications reviewed      VITALS:  T(F): 98.3 (02-03-25 @ 08:50), Max: 99.4 (02-02-25 @ 21:17)  HR: 105 (02-03-25 @ 08:50)  BP: 122/72 (02-03-25 @ 08:50)  RR: 18 (02-03-25 @ 08:50)  SpO2: 95% (02-03-25 @ 08:50)  Wt(kg): --    02-02 @ 07:01  -  02-03 @ 07:00  --------------------------------------------------------  IN: 1219.2 mL / OUT: 1600 mL / NET: -380.8 mL        PHYSICAL EXAM:    Gen: NAD, calm  Cards: RRR, +S1/S2, no M/G/R  Resp: bibasilar rales  GI: soft, NT/ND, NABS  Vascular: 2+ RLE edema > LLE edema      LABS:  02-03    136  |  96  |  50[H]  ----------------------------<  113[H]  4.0   |  24  |  3.31[H]    Ca    9.9      03 Feb 2025 07:11  Phos  4.1     02-03  Mg     2.1     02-03      Creatinine Trend: 3.31 <--, 2.65 <--, 3.90 <--, 4.40 <--, 4.27 <--, 3.60 <--, 2.36 <--                        9.5    9.53  )-----------( 382      ( 03 Feb 2025 07:11 )             27.5     Urine Studies:  Urinalysis Basic - ( 03 Feb 2025 07:11 )    Color:  / Appearance:  / SG:  / pH:   Gluc: 113 mg/dL / Ketone:   / Bili:  / Urobili:    Blood:  / Protein:  / Nitrite:    Leuk Esterase:  / RBC:  / WBC    Sq Epi:  / Non Sq Epi:  / Bacteria:       Creatinine, Random Urine: 33 mg/dL (01-30 @ 20:41)  Protein/Creatinine Ratio Calculation: 1.1 Ratio (01-30 @ 20:41)  Creatinine, Random Urine: 102 mg/dL (01-29 @ 11:53)  Sodium, Random Urine: 10 mmol/L (01-27 @ 13:10)  Potassium, Random Urine: 43 mmol/L (01-27 @ 13:10)  Creatinine, Random Urine: 122 mg/dL (01-27 @ 13:10)  Protein/Creatinine Ratio Calculation: 0.2 Ratio (01-27 @ 13:10)  Calcium, Random Urine: 1.0 mg/dL (01-27 @ 13:10)

## 2025-02-03 NOTE — PROGRESS NOTE ADULT - ASSESSMENT
Patient is a 64 year old Female, with a PMHx of of CHF (last TTE on 01/16/25 with EF of 30-35%, G2DD), DM, diabetic foot ulcer with a recent admission at [facility] for CHF exacerbation who presents to [facility] as a transfer for worsening right leg pain and fluid secretion. As per documentation, patient was reported to have severe depletion of RLE blood flow beyond the popliteal vessel at knee and similar findings in the left. Patient was transferred to [facility] for CO2 angiogram with Dr. Baltazar. Of note, patient with reported visual disturbance for which patient was seen and evaluated by opthalmology Internal Medicine has been consulted on Ms. Kirby's care for medical management.       CHF - HFrEF  - 1/16/2025 TTE with REDUCED EF of 30-35%, Grade II LVDD, Mild MR, Trace TR/ NC, Trace pericardial effusion  - NST - Abnormal, w/ small-sized, moderate defect(s) in apical wall that is predominantly fixed suggestive of an infarction with minimal marcus-infarct ischemia, post stress LV EF 25 %  - CT Chest w/ Small B/L pleural effusions and small pericardial effusion.  - GDMT as per Cardio - currently on Toprol XL 25 PO Qd (In view of JAMEL further medications currently held)   - On Dobutamine gtt, weaned to 2.5 as per Cardio. Weaning per Cardio   - S/P Lasix IV X 2 over the weekend. Monitor Cr   - Monitor I and O; Maintain O > I; Check daily weights   - Monitor volume status; Monitor electrolytes   - Monitor on tele   - Cardio eval appreciated; F/u recs    Diabetic Foot Ulcer   - RLE Arterial Duplex w/ Popliteal artery occlusion   - LLE Arterial Duplex w/ underlying disease cannot be excluded   - On Lipitor  - On Hep gtt; Monitor Ptt and adjust as per normogram; Monitor H/H and for gross bleeding  - Trend inflammatory markers   - Transferred to [facility] for CO2 angiogram with Vascular - Patient is deemed to be a high risk candidate. Cardiac clearance as per Cardiology.   - Vascular care appreciated; F/u recs    Tachycardia  - ? Pain related  - On Toprol   - Continue to monitor on tele   - Monitor and replete electrolytes to maintain K > 4 and Mg > 2  - Cardio eval appreciated; F/u recs    Leukocytosis   - Likely 2/2 LE   - 1/23 BCx2 negative; UCx w/ probable contamination   - On Unasyn for ABX   - Trend CBC, temp curve, VS and adjust as tolerated     JAMEL, Hyponatremia, Metabolic Acidosis   - As per OSH documentation, JAMEL was thought to be 2/2 to Unasyn/ Bumex / Entresto use and hypotension   - Bladder scan, noted   - Renal US noted   - Cr up-trending; Monitor for LILIAN   - Avoid nephrotoxic agents  - Monitor Cr and daily BMP; Avoid overcorrection of Na > 6-8 mEq in 24 hours   - Albumin per renal   - S/P Lasix X 2 over the weekend. Monitor Cr. Discussed with renal. 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  - On ASA and Statin   - Opthalmology eval appreciated; F/u recs    LE Edema  - W/ notable LE Edema on exam  - Diuresis as per Cardio / renal   - LE elevation; Compression  - Monitor   - Podiatry and Vascular eval consults appreciated; F/u recs    Pericardial effusion   - TTE w/ trace pericardial effusion   - CT Chest w/ small pericardial effusion   - Denies chest pain, palpitations, chest tightness or discomfort, shortness of breath or dyspnea   - Repeat TTE in 1 month for further evaluation   - Monitor on tele  - Cardio follow up     Pleural effusion   - CT Chest w/ small B/L pleural effusions  - On RA; No respiratory complaints at present  - Monitor O2 saturation; Supplement to maintain > 90%   - Diuresis per cardio/ renal. Monitor Cr  - Check CXR     Indigestion, Constipation   - PPI ordered  - Bowel regimen - Miralax and Senna  - Serial abdominal examinations  - Monitor     Anemia   - Anemia labs - Iron, B12, Folate, Ferritin noted   - On Hep gtt as above; Monitor   - Transfuse for Hgb < 7.5  - Maintain active T/S; Monitor H/H; Large bore IV access    Diabetes Mellitus   - A1C 11.6   - C/w Sliding scale, Lantus (If NPO, reduce Lantus)   - Diabetic, DASH Diet  - Monitor glucose levels; Adjust insulin regimen as tolerated   - ENDO eval consulted; F/u recs   - Patient will require outpatient Endo, Renal, Optho and Podiatry follow ups upon DC    HLD  - Check Lipid panel   - On Lipitor 40     HTN   - On Toprol XL as above   - On Dobutamine gtt at present   - Monitor BP, VS and adjust as tolerated; Reported to be hypotensive at OSH as per documentation     PPX       Discussed with Attending, Renal and RN  Patient is a 64 year old Female, with a PMHx of of CHF (last TTE on 01/16/25 with EF of 30-35%, G2DD), DM, diabetic foot ulcer with a recent admission at [facility] for CHF exacerbation who presents to [facility] as a transfer for worsening right leg pain and fluid secretion. As per documentation, patient was reported to have severe depletion of RLE blood flow beyond the popliteal vessel at knee and similar findings in the left. Patient was transferred to [facility] for CO2 angiogram with Dr. Baltazar. Of note, patient with reported visual disturbance for which patient was seen and evaluated by opthalmology Internal Medicine has been consulted on Ms. Kirby's care for medical management.       CHF - HFrEF  - 1/16/2025 TTE with REDUCED EF of 30-35%, Grade II LVDD, Mild MR, Trace TR/ SC, Trace pericardial effusion  - NST - Abnormal, w/ small-sized, moderate defect(s) in apical wall that is predominantly fixed suggestive of an infarction with minimal marcus-infarct ischemia, post stress LV EF 25 %  - CT Chest w/ Small B/L pleural effusions and small pericardial effusion.  - GDMT as per Cardio - currently on Toprol XL 25 PO Qd (In view of JAMEL further medications currently held)   - On Dobutamine gtt, weaned to 2.5 as per Cardio. Weaning per Cardio   - S/P Lasix IV X 2 over the weekend. Monitor Cr   - Monitor I and O; Maintain O > I; Check daily weights   - Monitor volume status; Monitor electrolytes   - Monitor on tele   - Cardio eval appreciated; F/u recs    Diabetic Foot Ulcer   - RLE Arterial Duplex w/ Popliteal artery occlusion   - LLE Arterial Duplex w/ underlying disease cannot be excluded   - On Lipitor  - On Hep gtt; Monitor Ptt and adjust as per normogram; Monitor H/H and for gross bleeding  - Trend inflammatory markers   - Transferred to [facility] for CO2 angiogram with Vascular - Patient is deemed to be a high risk candidate. Cardiac clearance as per Cardiology.   - Vascular care appreciated; F/u recs    Tachycardia  - ? Pain related  - On Toprol   - Continue to monitor on tele   - Monitor and replete electrolytes to maintain K > 4 and Mg > 2  - Cardio eval appreciated; F/u recs    Leukocytosis   - Likely 2/2 LE   - 1/23 BCx2 negative; UCx w/ probable contamination   - On Unasyn for ABX   - Trend CBC, temp curve, VS and adjust as tolerated     JAMEL, Hyponatremia, Metabolic Acidosis   - As per OSH documentation, JAMEL was thought to be 2/2 to Unasyn/ Bumex / Entresto use and hypotension   - Bladder scan, noted   - Renal US noted   - Cr up-trending; Monitor for LILIAN   - Avoid nephrotoxic agents  - Monitor Cr and daily BMP; Avoid overcorrection of Na > 6-8 mEq in 24 hours   - Albumin per renal   - S/P Lasix X 2 over the weekend. Monitor Cr. Discussed with renal. 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  - On ASA and Statin   - Opthalmology eval appreciated; F/u recs    LE Edema  - W/ notable LE Edema on exam  - Diuresis as per Cardio / renal   - LE elevation; Compression  - Monitor   - Podiatry and Vascular eval consults appreciated; F/u recs    Pericardial effusion   - TTE w/ trace pericardial effusion   - CT Chest w/ small pericardial effusion   - Denies chest pain, palpitations, chest tightness or discomfort, shortness of breath or dyspnea   - Repeat TTE in 1 month for further evaluation   - Monitor on tele  - Cardio follow up     Pleural effusion   - CT Chest w/ small B/L pleural effusions  - On RA; No respiratory complaints at present  - Monitor O2 saturation; Supplement to maintain > 90%   - Diuresis per cardio/ renal. Monitor Cr  - Check CXR     Indigestion, Constipation   - PPI ordered  - Bowel regimen - Miralax and Senna  - Serial abdominal examinations  - Monitor     Anemia   - Anemia labs - Iron, B12, Folate, Ferritin noted   - On Hep gtt as above; Monitor   - Transfuse for Hgb < 7.5  - Maintain active T/S; Monitor H/H; Large bore IV access    Diabetes Mellitus   - A1C 11.6   - C/w Sliding scale, Lantus (If NPO, reduce Lantus)   - Diabetic, DASH Diet  - Monitor glucose levels; Adjust insulin regimen as tolerated   - BHB down-trended to WNL   - ENDO eval consulted; F/u recs   - Patient will require outpatient Endo, Renal, Optho and Podiatry follow ups upon DC    HLD  - Check Lipid panel   - On Lipitor 40     HTN   - On Toprol XL as above   - On Dobutamine gtt at present   - Monitor BP, VS and adjust as tolerated; Reported to be hypotensive at OSH as per documentation     PPX       Discussed with Attending, Renal and RN

## 2025-02-04 LAB
ALBUMIN SERPL ELPH-MCNC: 4.5 G/DL — SIGNIFICANT CHANGE UP (ref 3.3–5)
ALP SERPL-CCNC: 59 U/L — SIGNIFICANT CHANGE UP (ref 40–120)
ALT FLD-CCNC: 21 U/L — SIGNIFICANT CHANGE UP (ref 10–45)
ANION GAP SERPL CALC-SCNC: 18 MMOL/L — HIGH (ref 5–17)
APTT BLD: 110.3 SEC — HIGH (ref 24.5–35.6)
APTT BLD: 117.2 SEC — HIGH (ref 24.5–35.6)
APTT BLD: 146 SEC — CRITICAL HIGH (ref 24.5–35.6)
AST SERPL-CCNC: 27 U/L — SIGNIFICANT CHANGE UP (ref 10–40)
BASOPHILS # BLD AUTO: 0.02 K/UL — SIGNIFICANT CHANGE UP (ref 0–0.2)
BASOPHILS NFR BLD AUTO: 0.3 % — SIGNIFICANT CHANGE UP (ref 0–2)
BILIRUB SERPL-MCNC: 0.5 MG/DL — SIGNIFICANT CHANGE UP (ref 0.2–1.2)
BUN SERPL-MCNC: 50 MG/DL — HIGH (ref 7–23)
CALCIUM SERPL-MCNC: 9.4 MG/DL — SIGNIFICANT CHANGE UP (ref 8.4–10.5)
CHLORIDE SERPL-SCNC: 95 MMOL/L — LOW (ref 96–108)
CO2 SERPL-SCNC: 22 MMOL/L — SIGNIFICANT CHANGE UP (ref 22–31)
CREAT SERPL-MCNC: 2.98 MG/DL — HIGH (ref 0.5–1.3)
EGFR: 17 ML/MIN/1.73M2 — LOW
EGFR: 17 ML/MIN/1.73M2 — LOW
EOSINOPHIL # BLD AUTO: 0.12 K/UL — SIGNIFICANT CHANGE UP (ref 0–0.5)
EOSINOPHIL NFR BLD AUTO: 1.9 % — SIGNIFICANT CHANGE UP (ref 0–6)
GLUCOSE BLDC GLUCOMTR-MCNC: 128 MG/DL — HIGH (ref 70–99)
GLUCOSE BLDC GLUCOMTR-MCNC: 198 MG/DL — HIGH (ref 70–99)
GLUCOSE BLDC GLUCOMTR-MCNC: 219 MG/DL — HIGH (ref 70–99)
GLUCOSE BLDC GLUCOMTR-MCNC: 280 MG/DL — HIGH (ref 70–99)
GLUCOSE SERPL-MCNC: 118 MG/DL — HIGH (ref 70–99)
HCT VFR BLD CALC: 26.4 % — LOW (ref 34.5–45)
HGB BLD-MCNC: 9.2 G/DL — LOW (ref 11.5–15.5)
IMM GRANULOCYTES NFR BLD AUTO: 0.6 % — SIGNIFICANT CHANGE UP (ref 0–0.9)
LYMPHOCYTES # BLD AUTO: 1.64 K/UL — SIGNIFICANT CHANGE UP (ref 1–3.3)
LYMPHOCYTES # BLD AUTO: 26.1 % — SIGNIFICANT CHANGE UP (ref 13–44)
MAGNESIUM SERPL-MCNC: 2.1 MG/DL — SIGNIFICANT CHANGE UP (ref 1.6–2.6)
MCHC RBC-ENTMCNC: 24.9 PG — LOW (ref 27–34)
MCHC RBC-ENTMCNC: 34.8 G/DL — SIGNIFICANT CHANGE UP (ref 32–36)
MCV RBC AUTO: 71.5 FL — LOW (ref 80–100)
MONOCYTES # BLD AUTO: 0.6 K/UL — SIGNIFICANT CHANGE UP (ref 0–0.9)
MONOCYTES NFR BLD AUTO: 9.5 % — SIGNIFICANT CHANGE UP (ref 2–14)
NEUTROPHILS # BLD AUTO: 3.87 K/UL — SIGNIFICANT CHANGE UP (ref 1.8–7.4)
NEUTROPHILS NFR BLD AUTO: 61.6 % — SIGNIFICANT CHANGE UP (ref 43–77)
NRBC # BLD: 0 /100 WBCS — SIGNIFICANT CHANGE UP (ref 0–0)
NRBC BLD-RTO: 0 /100 WBCS — SIGNIFICANT CHANGE UP (ref 0–0)
PHOSPHATE SERPL-MCNC: 4 MG/DL — SIGNIFICANT CHANGE UP (ref 2.5–4.5)
PLATELET # BLD AUTO: 342 K/UL — SIGNIFICANT CHANGE UP (ref 150–400)
POTASSIUM SERPL-MCNC: 3.8 MMOL/L — SIGNIFICANT CHANGE UP (ref 3.5–5.3)
POTASSIUM SERPL-SCNC: 3.8 MMOL/L — SIGNIFICANT CHANGE UP (ref 3.5–5.3)
PROT SERPL-MCNC: 7.7 G/DL — SIGNIFICANT CHANGE UP (ref 6–8.3)
RBC # BLD: 3.69 M/UL — LOW (ref 3.8–5.2)
RBC # FLD: 15.7 % — HIGH (ref 10.3–14.5)
SODIUM SERPL-SCNC: 135 MMOL/L — SIGNIFICANT CHANGE UP (ref 135–145)
WBC # BLD: 6.29 K/UL — SIGNIFICANT CHANGE UP (ref 3.8–10.5)
WBC # FLD AUTO: 6.29 K/UL — SIGNIFICANT CHANGE UP (ref 3.8–10.5)

## 2025-02-04 PROCEDURE — 99232 SBSQ HOSP IP/OBS MODERATE 35: CPT

## 2025-02-04 RX ORDER — FUROSEMIDE 10 MG/ML
40 INJECTION INTRAMUSCULAR; INTRAVENOUS DAILY
Refills: 0 | Status: DISCONTINUED | OUTPATIENT
Start: 2025-02-04 | End: 2025-02-05

## 2025-02-04 RX ORDER — LIDOCAINE HYDROCHLORIDE 20 MG/ML
1 JELLY TOPICAL ONCE
Refills: 0 | Status: COMPLETED | OUTPATIENT
Start: 2025-02-04 | End: 2025-02-04

## 2025-02-04 RX ADMIN — INSULIN GLARGINE-YFGN 18 UNIT(S): 100 INJECTION, SOLUTION SUBCUTANEOUS at 22:12

## 2025-02-04 RX ADMIN — AMPICILLIN SODIUM AND SULBACTAM SODIUM 200 GRAM(S): 1; .5 INJECTION, POWDER, FOR SOLUTION INTRAMUSCULAR; INTRAVENOUS at 11:45

## 2025-02-04 RX ADMIN — Medication 2 TABLET(S): at 21:38

## 2025-02-04 RX ADMIN — ALBUMIN (HUMAN) 50 MILLILITER(S): 12.5 INJECTION, SOLUTION INTRAVENOUS at 17:52

## 2025-02-04 RX ADMIN — ALBUMIN (HUMAN) 50 MILLILITER(S): 12.5 INJECTION, SOLUTION INTRAVENOUS at 12:43

## 2025-02-04 RX ADMIN — LIDOCAINE HYDROCHLORIDE 1 PATCH: 20 JELLY TOPICAL at 21:58

## 2025-02-04 RX ADMIN — LIDOCAINE HYDROCHLORIDE 1 PATCH: 20 JELLY TOPICAL at 09:31

## 2025-02-04 RX ADMIN — Medication 650 MILLIGRAM(S): at 23:16

## 2025-02-04 RX ADMIN — Medication 40 MILLIGRAM(S): at 11:45

## 2025-02-04 RX ADMIN — LIDOCAINE HYDROCHLORIDE 1 PATCH: 20 JELLY TOPICAL at 19:14

## 2025-02-04 RX ADMIN — INSULIN LISPRO 3: 100 INJECTION, SOLUTION INTRAVENOUS; SUBCUTANEOUS at 12:44

## 2025-02-04 RX ADMIN — FUROSEMIDE 40 MILLIGRAM(S): 10 INJECTION INTRAMUSCULAR; INTRAVENOUS at 10:21

## 2025-02-04 RX ADMIN — METOPROLOL SUCCINATE 25 MILLIGRAM(S): 50 TABLET, EXTENDED RELEASE ORAL at 05:32

## 2025-02-04 RX ADMIN — Medication 81 MILLIGRAM(S): at 11:45

## 2025-02-04 RX ADMIN — DOBUTAMINE 5.41 MICROGRAM(S)/KG/MIN: 250 INJECTION INTRAVENOUS at 07:20

## 2025-02-04 RX ADMIN — INSULIN LISPRO 2: 100 INJECTION, SOLUTION INTRAVENOUS; SUBCUTANEOUS at 17:53

## 2025-02-04 RX ADMIN — Medication 650 MILLIGRAM(S): at 01:32

## 2025-02-04 RX ADMIN — ATORVASTATIN CALCIUM 40 MILLIGRAM(S): 80 TABLET, FILM COATED ORAL at 21:39

## 2025-02-04 RX ADMIN — HEPARIN SODIUM 7 UNIT(S)/HR: 1000 INJECTION INTRAVENOUS; SUBCUTANEOUS at 19:13

## 2025-02-04 RX ADMIN — HEPARIN SODIUM 9 UNIT(S)/HR: 1000 INJECTION INTRAVENOUS; SUBCUTANEOUS at 00:33

## 2025-02-04 RX ADMIN — Medication 650 MILLIGRAM(S): at 00:32

## 2025-02-04 RX ADMIN — ALBUMIN (HUMAN) 50 MILLILITER(S): 12.5 INJECTION, SOLUTION INTRAVENOUS at 23:55

## 2025-02-04 RX ADMIN — ALBUMIN (HUMAN) 50 MILLILITER(S): 12.5 INJECTION, SOLUTION INTRAVENOUS at 05:32

## 2025-02-04 RX ADMIN — HEPARIN SODIUM 7 UNIT(S)/HR: 1000 INJECTION INTRAVENOUS; SUBCUTANEOUS at 17:52

## 2025-02-04 RX ADMIN — HEPARIN SODIUM 8 UNIT(S)/HR: 1000 INJECTION INTRAVENOUS; SUBCUTANEOUS at 09:31

## 2025-02-04 RX ADMIN — HEPARIN SODIUM 9 UNIT(S)/HR: 1000 INJECTION INTRAVENOUS; SUBCUTANEOUS at 07:21

## 2025-02-04 RX ADMIN — Medication 650 MILLIGRAM(S): at 22:16

## 2025-02-04 NOTE — PROGRESS NOTE ADULT - SUBJECTIVE AND OBJECTIVE BOX
MR#[id number]  PATIENT NAME:COLE ALBA    DATE OF SERVICE: 02-04-25 @ 0930  Patient was seen and examined by Yordy Gilmore MD on    02-04-25 @ 0930  Interim events noted.Consultant notes ,Labs,Telemetry reviewed by me       HOSPITAL COURSE: HPI:  64F, hx of CHF (last TTE on 1/16/25 EF 30-35%, G2DD), DM, diabetic foot ulcer with a recent admission at [facility] for CHF exacerbation presented as a transfer for worsening R. leg pain and fluid secretion, On non-invasive imaging demonstrating severe depletion of RLE blood flow beyond the popliteal vessel at knee and similar findings in L. Was transferred to [facility] for CO2 angiogram with Dr. Baltazar. (29 Jan 2025 20:05)      INTERIM EVENTS:Patient seen at bedside ,interim events noted.      PMH -reviewed admission note, no change since admission  HEART FAILURE: Acute[ ]Chronic[ ] Systolic[ ] Diastolic[ ] Combined Systolic and Diastolic[ ]  CAD[ ] CABG[ ] PCI[ ]  DEVICES[ ] PPM[ ] ICD[ ] ILR[ ]  ATRIAL FIBRILLATION[ ] Paroxysmal[ ] Permanent[ ] CHADS2-[  ]  JAMEL[ ] CKD1[ ] CKD2[ ] CKD3[ ] CKD4[ ] ESRD[ ]  COPD[ ] HTN[ ]   DM[ ] Type1[ ] Type 2[ ]   CVA[ ] Paresis[ ]    AMBULATION: Assisted[ ] Cane/walker[ ] Independent[ ]    MEDICATIONS  (STANDING):  albumin human 25% IVPB 50 milliLiter(s) IV Intermittent every 6 hours  ampicillin/sulbactam  IVPB 3 Gram(s) IV Intermittent daily  ampicillin/sulbactam  IVPB      aspirin enteric coated 81 milliGRAM(s) Oral daily  atorvastatin 40 milliGRAM(s) Oral at bedtime  benzocaine/menthol Lozenge 1 Lozenge Oral once  dextrose 5%. 1000 milliLiter(s) (50 mL/Hr) IV Continuous <Continuous>  dextrose 5%. 1000 milliLiter(s) (100 mL/Hr) IV Continuous <Continuous>  dextrose 50% Injectable 25 Gram(s) IV Push once  dextrose 50% Injectable 12.5 Gram(s) IV Push once  dextrose 50% Injectable 25 Gram(s) IV Push once  DOBUTamine Infusion 2.5 MICROgram(s)/kG/Min (5.41 mL/Hr) IV Continuous <Continuous>  furosemide   Injectable 40 milliGRAM(s) IV Push daily  glucagon  Injectable 1 milliGRAM(s) IntraMuscular once  heparin  Infusion 1100 Unit(s)/Hr (7 mL/Hr) IV Continuous <Continuous>  insulin glargine Injectable (LANTUS) 18 Unit(s) SubCutaneous at bedtime  insulin lispro (ADMELOG) corrective regimen sliding scale   SubCutaneous at bedtime  insulin lispro (ADMELOG) corrective regimen sliding scale   SubCutaneous three times a day before meals  metoprolol succinate ER 25 milliGRAM(s) Oral daily  pantoprazole  Injectable 40 milliGRAM(s) IV Push daily  polyethylene glycol 3350 17 Gram(s) Oral daily  senna 2 Tablet(s) Oral at bedtime    MEDICATIONS  (PRN):  acetaminophen     Tablet .. 650 milliGRAM(s) Oral every 6 hours PRN Mild Pain (1 - 3)  dextrose Oral Gel 15 Gram(s) Oral once PRN Blood Glucose LESS THAN 70 milliGRAM(s)/deciliter  melatonin 3 milliGRAM(s) Oral at bedtime PRN Insomnia  ondansetron Injectable 4 milliGRAM(s) IV Push every 6 hours PRN Nausea and/or Vomiting  oxyCODONE    IR 5 milliGRAM(s) Oral every 6 hours PRN Severe Pain (7 - 10)            REVIEW OF SYSTEMS:  Constitutional: [ ] fever, [ ]weight loss,  [ ]fatigue [ ]weight gain  Eyes: [ ] visual changes  Respiratory: [ ]shortness of breath;  [ ] cough, [ ]wheezing, [ ]chills, [ ]hemoptysis  Cardiovascular: [ ] chest pain, [ ]palpitations, [ ]dizziness,  [ ]leg swelling[ ]orthopnea[ ]PND  Gastrointestinal: [ ] abdominal pain, [ ]nausea, [ ]vomiting,  [ ]diarrhea [ ]Constipation [ ]Melena  Genitourinary: [ ] dysuria, [ ] hematuria [ ][place]  Neurologic: [ ] headaches [ ] tremors[ ]weakness [ ]Paralysis Right[ ] Left[ ]  Skin: [ ] itching, [ ]burning, [ ] rashes  Endocrine: [ ] heat or cold intolerance  Musculoskeletal: [ ] joint pain or swelling; [ ] muscle, back, or extremity pain  Psychiatric: [ ] depression, [ ]anxiety, [ ]mood swings, or [ ]difficulty sleeping  Hematologic: [ ] easy bruising, [ ] bleeding gums    [ ] All remaining systems negative except as per above.   [ ]Unable to obtain.  [x] No change in ROS since admission      Vital Signs Last 24 Hrs  T(C): 37 (04 Feb 2025 21:30), Max: 37.1 (04 Feb 2025 01:07)  T(F): 98.6 (04 Feb 2025 21:30), Max: 98.8 (04 Feb 2025 01:07)  HR: 93 (04 Feb 2025 21:30) (87 - 100)  BP: 110/68 (04 Feb 2025 21:30) (103/64 - 116/70)  BP(mean): --  RR: 18 (04 Feb 2025 21:30) (18 - 18)  SpO2: 94% (04 Feb 2025 21:30) (93% - 94%)    Parameters below as of 04 Feb 2025 21:30  Patient On (Oxygen Delivery Method): room air      I&O's Summary    03 Feb 2025 07:01  -  04 Feb 2025 07:00  --------------------------------------------------------  IN: 899.9 mL / OUT: 300 mL / NET: 599.9 mL    04 Feb 2025 07:01  -  04 Feb 2025 22:23  --------------------------------------------------------  IN: 743.9 mL / OUT: 800 mL / NET: -56.1 mL        PHYSICAL EXAM:  General: No acute distress BMI-  HEENT: EOMI, PERRL  Neck: Supple, [ ] JVD  Lungs: Equal air entry bilaterally; [ ] rales [ ] wheezing [ ] rhonchi  Heart: Regular rate and rhythm; [x ] murmur   2/6 [ x] systolic [ ] diastolic [ ] radiation[ ] rubs [ ]  gallops  Abdomen: Nontender, bowel sounds present  Extremities: No clubbing, cyanosis, [ ] edema [ ]Pulses  equal and intact  Nervous system:  Alert & Oriented X3, no focal deficits  Psychiatric: Normal affect  Skin: No rashes or lesions    LABS:  02-04    135  |  95[L]  |  50[H]  ----------------------------<  118[H]  3.8   |  22  |  2.98[H]    Ca    9.4      04 Feb 2025 07:33  Phos  4.0     02-04  Mg     2.1     02-04    TPro  7.7  /  Alb  4.5  /  TBili  0.5  /  DBili  x   /  AST  27  /  ALT  21  /  AlkPhos  59  02-04    Creatinine Trend: 2.98<--, 3.31<--, 2.65<--, 3.90<--, 4.40<--, 4.27<--                        9.2    6.29  )-----------( 342      ( 04 Feb 2025 07:34 )             26.4     PTT - ( 04 Feb 2025 15:36 )  PTT:117.2 sec

## 2025-02-04 NOTE — PROGRESS NOTE ADULT - SUBJECTIVE AND OBJECTIVE BOX
[facility] NEPHROLOGY- PROGRESS NOTE    64y Female with history of CHF presents as a transfer for LE CO2 angiogram. Nephrology consulted for elevated Scr.    REVIEW OF SYSTEMS:  Gen: no fevers  Cards: no chest pain  Resp: + dyspnea with exertion, + cough  GI: no nausea and vomiting this morning, no diarrhea  Vascular: + LE edema    No Known Allergies      Hospital Medications: Medications reviewed      VITALS:  T(F): 98.2 (02-04-25 @ 08:31), Max: 98.9 (02-03-25 @ 16:58)  HR: 87 (02-04-25 @ 08:31)  BP: 116/70 (02-04-25 @ 08:31)  RR: 18 (02-04-25 @ 08:31)  SpO2: 94% (02-04-25 @ 08:31)  Wt(kg): --    02-03 @ 07:01  -  02-04 @ 07:00  --------------------------------------------------------  IN: 899.9 mL / OUT: 300 mL / NET: 599.9 mL    02-04 @ 07:01  -  02-04 @ 09:52  --------------------------------------------------------  IN: 375 mL / OUT: 500 mL / NET: -125 mL        PHYSICAL EXAM:    Gen: NAD, calm  Cards: RRR, +S1/S2, no M/G/R  Resp: bibasilar rales  GI: soft, NT/ND, NABS  Vascular: 2+ RLE edema > LLE edema      LABS:  02-04    135  |  95[L]  |  50[H]  ----------------------------<  118[H]  3.8   |  22  |  2.98[H]    Ca    9.4      04 Feb 2025 07:33  Phos  4.0     02-04  Mg     2.1     02-04    TPro  7.7  /  Alb  4.5  /  TBili  0.5  /  DBili      /  AST  27  /  ALT  21  /  AlkPhos  59  02-04    Creatinine Trend: 2.98 <--, 3.31 <--, 2.65 <--, 3.90 <--, 4.40 <--, 4.27 <--, 3.60 <--                        9.2    6.29  )-----------( 342      ( 04 Feb 2025 07:34 )             26.4     Urine Studies:  Urinalysis Basic - ( 04 Feb 2025 07:33 )    Color:  / Appearance:  / SG:  / pH:   Gluc: 118 mg/dL / Ketone:   / Bili:  / Urobili:    Blood:  / Protein:  / Nitrite:    Leuk Esterase:  / RBC:  / WBC    Sq Epi:  / Non Sq Epi:  / Bacteria:       Creatinine, Random Urine: 33 mg/dL (01-30 @ 20:41)  Protein/Creatinine Ratio Calculation: 1.1 Ratio (01-30 @ 20:41)  Creatinine, Random Urine: 102 mg/dL (01-29 @ 11:53)

## 2025-02-04 NOTE — PROGRESS NOTE ADULT - ASSESSMENT
TOMASZ ALBA is a 64y old woman with history of Type 2 DM, HTN, HLD, CAD, PAD, transferred here for CO2 angiogram, endocrine consulted for Type 2 DM management in the setting of poorly controlled Type 2 DM.     1. Type 2 DM  A1C:A1C with Estimated Average Glucose Result: 11.6 % (01-24-25 @ 05:40)  A1C with Estimated Average Glucose Result: >15.5 % (12-13-24 @ 06:49)  Home regimen: Lantus 30 units, CeQur insulin patch about 2-10 units TID with meals   Endocrinologist: Dr Grace   EGFR: eGFR: 15 mL/min/1.73m2 (02-03-25 @ 07:11)      2/4  Glucose goal of 100-180mg/dl while in patient.   Recommend Lantus  18 units at bedtime  Holding short acting insulin with meals due to poor appetite.  states she is not eating much due to cough   if appetite increases and is eating meals and glucose >200, than start ademelog 4 units premeals   Recommend LDSS (1:50 above 150mg/dl) and night time low does sliding scale for hyperglycemia corrections    inform endocrine of hypoglycemia or persistent hyperglycemia episodes as changes in pts insulin regimen will need to be made.   notify endocrine if any plans to be NPO/diet changes as this will also affect insulin regimen.      Discharge recommendation/considerations:  Likely basal bolus regimen given kidney function   fu with outpt endocrine Dr. Grace   Not a candidate for SGLT2 due to leg ulcers and also significantly decreased EGFR.       Discussed with patient the importance of Carb consistent diet and exercise as tolerated.    Recommend nutritional consultation  Recommend DM education through RN staff (see physical to RN order)  Discussed glycemic goal of a1c <7% to prevent microvascular complications of diabetes mellitus and reduce the risk of macrovascular complications.   Recommend annual podiatry and ophthalmology follow up.       PLAN DISCUSSED WITH PRIMARY TEAM VIA [employer] TEAMS/PHONE/EMAIL      2. HTN  BP goal <130/80  Management as per primary team as well as neprhology and cardiology team  obtain outpt mc/cr ratio           3. HLD  LDL Goal <70mg/dl   c/w lipitor 40mg             Karley Maravilla MD  Attending Physician   Department of Endocrinology, Diabetes and Metabolism     weekdays: 9am to 5pm: email [facility] or [facility] and or TEAMS     Nights and weekends: [phone]  Please note that this patient may be followed by a different provider tomorrow.   If no answer or after hours, please contact [phone].  For final dc reccomendations, please call [phone] or page the endocrine fellow on call.

## 2025-02-04 NOTE — PROGRESS NOTE ADULT - SUBJECTIVE AND OBJECTIVE BOX
Name of Patient : TOMASZ ALBA  MRN: [id number]  Date of visit: 02-04-25 @ 14:59      Subjective: Patient seen and examined. No new events except as noted.   feeling better  rate better controlled  pain is better     REVIEW OF SYSTEMS:    CONSTITUTIONAL: No weakness, fevers or chills  EYES/ENT: No visual changes;  No vertigo or throat pain   NECK: No pain or stiffness  RESPIRATORY: No cough, wheezing, hemoptysis; No shortness of breath  CARDIOVASCULAR: No chest pain or palpitations  GASTROINTESTINAL: No abdominal or epigastric pain. No nausea, vomiting, or hematemesis; No diarrhea or constipation. No melena or hematochezia.  GENITOURINARY: No dysuria, frequency or hematuria  NEUROLOGICAL: No numbness or weakness  SKIN: No itching, burning, rashes, or lesions   All other review of systems is negative unless indicated above.    MEDICATIONS:  MEDICATIONS  (STANDING):  albumin human 25% IVPB 50 milliLiter(s) IV Intermittent every 6 hours  ampicillin/sulbactam  IVPB 3 Gram(s) IV Intermittent daily  ampicillin/sulbactam  IVPB      aspirin enteric coated 81 milliGRAM(s) Oral daily  atorvastatin 40 milliGRAM(s) Oral at bedtime  benzocaine/menthol Lozenge 1 Lozenge Oral once  dextrose 5%. 1000 milliLiter(s) (50 mL/Hr) IV Continuous <Continuous>  dextrose 5%. 1000 milliLiter(s) (100 mL/Hr) IV Continuous <Continuous>  dextrose 50% Injectable 25 Gram(s) IV Push once  dextrose 50% Injectable 12.5 Gram(s) IV Push once  dextrose 50% Injectable 25 Gram(s) IV Push once  DOBUTamine Infusion 2.5 MICROgram(s)/kG/Min (5.41 mL/Hr) IV Continuous <Continuous>  furosemide   Injectable 40 milliGRAM(s) IV Push daily  glucagon  Injectable 1 milliGRAM(s) IntraMuscular once  heparin  Infusion 1100 Unit(s)/Hr (8 mL/Hr) IV Continuous <Continuous>  insulin glargine Injectable (LANTUS) 18 Unit(s) SubCutaneous at bedtime  insulin lispro (ADMELOG) corrective regimen sliding scale   SubCutaneous three times a day before meals  insulin lispro (ADMELOG) corrective regimen sliding scale   SubCutaneous at bedtime  metoprolol succinate ER 25 milliGRAM(s) Oral daily  pantoprazole  Injectable 40 milliGRAM(s) IV Push daily  polyethylene glycol 3350 17 Gram(s) Oral daily  senna 2 Tablet(s) Oral at bedtime      PHYSICAL EXAM:  T(C): 36.8 (02-04-25 @ 12:22), Max: 37.2 (02-03-25 @ 16:58)  HR: 94 (02-04-25 @ 12:22) (87 - 101)  BP: 103/64 (02-04-25 @ 12:22) (103/64 - 119/75)  RR: 18 (02-04-25 @ 12:22) (18 - 18)  SpO2: 93% (02-04-25 @ 12:22) (93% - 95%)  Wt(kg): --  I&O's Summary    03 Feb 2025 07:01  -  04 Feb 2025 07:00  --------------------------------------------------------  IN: 899.9 mL / OUT: 300 mL / NET: 599.9 mL    04 Feb 2025 07:01  -  04 Feb 2025 14:59  --------------------------------------------------------  IN: 407 mL / OUT: 500 mL / NET: -93 mL          Appearance: Normal	  HEENT:  PERRLA   Lymphatic: No lymphadenopathy   Cardiovascular: Normal S1 S2, no JVD  Respiratory: normal effort , clear  Gastrointestinal:  Soft, Non-tender  Skin: No rashes,  warm to touch  Psychiatry:  Mood & affect appropriate  Musculuskeletal: LE dressing     recent labs, Imaging and EKGs personally reviewed   CODE status discussed with the patient in detail    02-03-25 @ 07:01  -  02-04-25 @ 07:00  --------------------------------------------------------  IN: 899.9 mL / OUT: 300 mL / NET: 599.9 mL    02-04-25 @ 07:01  -  02-04-25 @ 14:59  --------------------------------------------------------  IN: 407 mL / OUT: 500 mL / NET: -93 mL                          9.2    6.29  )-----------( 342      ( 04 Feb 2025 07:34 )             26.4               02-04    135  |  95[L]  |  50[H]  ----------------------------<  118[H]  3.8   |  22  |  2.98[H]    Ca    9.4      04 Feb 2025 07:33  Phos  4.0     02-04  Mg     2.1     02-04    TPro  7.7  /  Alb  4.5  /  TBili  0.5  /  DBili  x   /  AST  27  /  ALT  21  /  AlkPhos  59  02-04    PTT - ( 04 Feb 2025 07:35 )  PTT:110.3 sec                   Urinalysis Basic - ( 04 Feb 2025 07:33 )    Color: x / Appearance: x / SG: x / pH: x  Gluc: 118 mg/dL / Ketone: x  / Bili: x / Urobili: x   Blood: x / Protein: x / Nitrite: x   Leuk Esterase: x / RBC: x / WBC x   Sq Epi: x / Non Sq Epi: x / Bacteria: x

## 2025-02-04 NOTE — PROGRESS NOTE ADULT - SUBJECTIVE AND OBJECTIVE BOX
Chief Complaint/Follow-up on: DM    Subjective:    POC blood glucose and insulin use reviewed.  she is reporting cough and low po intake   no n/v  tolerating PO   we spoke about inpt DM management goals and monitoring and also the need to fu outpt for DM       MEDICATIONS  (STANDING):  albumin human 25% IVPB 50 milliLiter(s) IV Intermittent every 6 hours  ampicillin/sulbactam  IVPB 3 Gram(s) IV Intermittent daily  ampicillin/sulbactam  IVPB      aspirin enteric coated 81 milliGRAM(s) Oral daily  atorvastatin 40 milliGRAM(s) Oral at bedtime  benzocaine/menthol Lozenge 1 Lozenge Oral once  dextrose 5%. 1000 milliLiter(s) (50 mL/Hr) IV Continuous <Continuous>  dextrose 5%. 1000 milliLiter(s) (100 mL/Hr) IV Continuous <Continuous>  dextrose 50% Injectable 25 Gram(s) IV Push once  dextrose 50% Injectable 12.5 Gram(s) IV Push once  dextrose 50% Injectable 25 Gram(s) IV Push once  DOBUTamine Infusion 2.5 MICROgram(s)/kG/Min (5.41 mL/Hr) IV Continuous <Continuous>  furosemide   Injectable 40 milliGRAM(s) IV Push daily  glucagon  Injectable 1 milliGRAM(s) IntraMuscular once  heparin  Infusion 1100 Unit(s)/Hr (8 mL/Hr) IV Continuous <Continuous>  insulin glargine Injectable (LANTUS) 18 Unit(s) SubCutaneous at bedtime  insulin lispro (ADMELOG) corrective regimen sliding scale   SubCutaneous at bedtime  insulin lispro (ADMELOG) corrective regimen sliding scale   SubCutaneous three times a day before meals  metoprolol succinate ER 25 milliGRAM(s) Oral daily  pantoprazole  Injectable 40 milliGRAM(s) IV Push daily  polyethylene glycol 3350 17 Gram(s) Oral daily  senna 2 Tablet(s) Oral at bedtime    MEDICATIONS  (PRN):  acetaminophen     Tablet .. 650 milliGRAM(s) Oral every 6 hours PRN Mild Pain (1 - 3)  dextrose Oral Gel 15 Gram(s) Oral once PRN Blood Glucose LESS THAN 70 milliGRAM(s)/deciliter  melatonin 3 milliGRAM(s) Oral at bedtime PRN Insomnia  ondansetron Injectable 4 milliGRAM(s) IV Push every 6 hours PRN Nausea and/or Vomiting  oxyCODONE    IR 5 milliGRAM(s) Oral every 6 hours PRN Severe Pain (7 - 10)      PHYSICAL EXAM:  VITALS: T(C): 36.8 (02-04-25 @ 12:22)  T(F): 98.2 (02-04-25 @ 12:22), Max: 98.9 (02-03-25 @ 16:58)  HR: 94 (02-04-25 @ 12:22) (87 - 101)  BP: 103/64 (02-04-25 @ 12:22) (103/64 - 119/75)  RR:  (18 - 18)  SpO2:  (93% - 95%)  Wt(kg): --  GENERAL: pt appears uncomfortable with coughing, sitting in chair   HEENT:  Atraumatic, Normocephalic, moist mucous membranes  RESPIRATORY: pt with cough, on RA  CARDIOVASCULAR: Regular rate and rhythm; No murmurs; no peripheral edema  GI: Soft, nontender, non distended, normal bowel sounds  psych: alert and oriented X3       POCT Blood Glucose.: 128 mg/dL (02-04-25 @ 08:51)  POCT Blood Glucose.: 183 mg/dL (02-03-25 @ 22:36)  POCT Blood Glucose.: 116 mg/dL (02-03-25 @ 18:03)  POCT Blood Glucose.: 159 mg/dL (02-03-25 @ 13:04)  POCT Blood Glucose.: 166 mg/dL (02-03-25 @ 09:53)  POCT Blood Glucose.: 185 mg/dL (02-02-25 @ 21:18)  POCT Blood Glucose.: 159 mg/dL (02-02-25 @ 17:17)  POCT Blood Glucose.: 175 mg/dL (02-02-25 @ 12:14)  POCT Blood Glucose.: 162 mg/dL (02-02-25 @ 08:29)  POCT Blood Glucose.: 182 mg/dL (02-01-25 @ 21:18)  POCT Blood Glucose.: 165 mg/dL (02-01-25 @ 17:11)    02-04    135  |  95[L]  |  50[H]  ----------------------------<  118[H]  3.8   |  22  |  2.98[H]    eGFR: 17[L]    Ca    9.4      02-04  Mg     2.1     02-04  Phos  4.0     02-04    TPro  7.7  /  Alb  4.5  /  TBili  0.5  /  DBili  x   /  AST  27  /  ALT  21  /  AlkPhos  59  02-04          Thyroid Function Tests:  01-24 @ 05:40 TSH 5.34 FreeT4 -- T3 -- Anti TPO -- Anti Thyroglobulin Ab -- TSI --

## 2025-02-04 NOTE — PROGRESS NOTE ADULT - ASSESSMENT
Patient is a 64 year old Female, with a PMHx of of CHF (last TTE on 01/16/25 with EF of 30-35%, G2DD), DM, diabetic foot ulcer with a recent admission at [facility] for CHF exacerbation who presents to [facility] as a transfer for worsening right leg pain and fluid secretion. As per documentation, patient was reported to have severe depletion of RLE blood flow beyond the popliteal vessel at knee and similar findings in the left. Patient was transferred to [facility] for CO2 angiogram with Dr. Baltazar. Of note, patient with reported visual disturbance for which patient was seen and evaluated by opthalmology Internal Medicine has been consulted on Ms. Kirby's care for medical management.       CHF - HFrEF  - 1/16/2025 TTE with REDUCED EF of 30-35%, Grade II LVDD, Mild MR, Trace TR/ NE, Trace pericardial effusion  - NST - Abnormal, w/ small-sized, moderate defect(s) in apical wall that is predominantly fixed suggestive of an infarction with minimal marcus-infarct ischemia, post stress LV EF 25 %  - CT Chest w/ Small B/L pleural effusions and small pericardial effusion.  - GDMT as per Cardio - currently on Toprol XL 25 PO Qd (In view of JAMEL further medications currently held)   - On Dobutamine gtt, weaned to 2.5 as per Cardio. Weaning per Cardio .   - S/P Lasix IV X 2 over the weekend. Monitor Cr , diuresis per renal   - Monitor I and O; Maintain O > I; Check daily weights   - Monitor volume status; Monitor electrolytes   - Monitor on tele   - Cardio eval appreciated; F/u recs    Diabetic Foot Ulcer   - RLE Arterial Duplex w/ Popliteal artery occlusion   - LLE Arterial Duplex w/ underlying disease cannot be excluded   - On Lipitor  - On Hep gtt; Monitor Ptt and adjust as per normogram; Monitor H/H and for gross bleeding  - Trend inflammatory markers   - Transferred to [facility] for CO2 angiogram with Vascular - Patient is deemed to be a high risk candidate. Cardiac clearance as per Cardiology.   - Vascular care appreciated; F/u recs  - wound care called for dressing recs     Tachycardia  - ? Pain related  - On Toprol   - Continue to monitor on tele   - Monitor and replete electrolytes to maintain K > 4 and Mg > 2  - Cardio eval appreciated; F/u recs  - improved tachycardia     Leukocytosis   - Likely 2/2 LE   - 1/23 BCx2 negative; UCx w/ probable contamination   - On Unasyn for ABX   - Trend CBC, temp curve, VS and adjust as tolerated     JAMEL, Hyponatremia, Metabolic Acidosis   - As per OSH documentation, JAMEL was thought to be 2/2 to Unasyn/ Bumex / Entresto use and hypotension   - Bladder scan, noted   - Renal US noted   - Cr up-trending; Monitor for LILIAN   - Avoid nephrotoxic agents  - Monitor Cr and daily BMP; Avoid overcorrection of Na > 6-8 mEq in 24 hours   - Albumin per renal   - S/P Lasix X 2 over the weekend. Monitor Cr. Discussed with renal. 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  - On ASA and Statin   - Opthalmology eval appreciated; F/u recs    LE Edema  - W/ notable LE Edema on exam  - Diuresis as per Cardio / renal   - LE elevation; Compression  - Monitor   - Podiatry and Vascular eval consults appreciated; F/u recs    Pericardial effusion   - TTE w/ trace pericardial effusion   - CT Chest w/ small pericardial effusion   - Denies chest pain, palpitations, chest tightness or discomfort, shortness of breath or dyspnea   - Repeat TTE in 1 month for further evaluation   - Monitor on tele  - Cardio follow up     Pleural effusion   - CT Chest w/ small B/L pleural effusions  - On RA; No respiratory complaints at present  - Monitor O2 saturation; Supplement to maintain > 90%   - Diuresis per cardio/ renal. Monitor Cr  - routine CXR     Indigestion, Constipation   - PPI ordered  - Bowel regimen - Miralax and Senna  - Serial abdominal examinations  - Monitor     Anemia   - Anemia labs - Iron, B12, Folate, Ferritin noted   - On Hep gtt as above; Monitor   - Transfuse for Hgb < 7.5  - Maintain active T/S; Monitor H/H; Large bore IV access    Diabetes Mellitus   - A1C 11.6   - C/w Sliding scale, Lantus (If NPO, reduce Lantus)   - Diabetic, DASH Diet  - Monitor glucose levels; Adjust insulin regimen as tolerated   - BHB down-trended to WNL   - ENDO eval consulted; F/u recs , appreciated   - Patient will require outpatient Endo, Renal, Optho and Podiatry follow ups upon DC    HLD  - Check Lipid panel   - On Lipitor 40     HTN   - On Toprol XL as above   - On Dobutamine gtt at present   - Monitor BP, VS and adjust as tolerated; Reported to be hypotensive at OSH as per documentation     PPX     Patient seen and examined by me. patient care and plan discussed and reviewed with PA. Plan as outlined above edited by me to reflect our discussion. Advanced care planning/advanced directives discussed with patient/family. DNR status including forceful chest compressions to attempt to restart the heart, ventilator support/artificial breathing, electric shock, artificial nutrition, health care proxy, Molst form all discussed with pt. Sixty seven minutes of total time dedicated to this patient visit today including preparing to see the patient (eg. review of tests), obtaining and/or reviewing separately obtained history, obtaining/reviewing vitals, performing a medically appropriate examination and/or evaluation, counseling and educating the patient/family/caregiver, reviewing previous notes and test results, and procedures, communicating with other health professionals (when not separately reported), and documenting clinical information in the electronic health record.

## 2025-02-04 NOTE — PROGRESS NOTE ADULT - SUBJECTIVE AND OBJECTIVE BOX
Vascular Surgery Daily Progress Note  =====================================================    SUBJECTIVE: Patient reports that they're feeling well.     --------------------------------------------------------------------------------------  VITAL SIGNS:  T(C): 36.8 (02-04-25 @ 12:22), Max: 37.2 (02-03-25 @ 16:58)  HR: 94 (02-04-25 @ 12:22) (87 - 101)  BP: 103/64 (02-04-25 @ 12:22) (103/64 - 119/75)  RR: 18 (02-04-25 @ 12:22) (18 - 18)  SpO2: 93% (02-04-25 @ 12:22) (93% - 95%)  --------------------------------------------------------------------------------------    EXAM    General: NAD, resting in bed comfortably  Cardiac: Regular rate, normotensive  Respiratory: Nonlabored respirations, normal cw expansion, on RA  Neuro: AOx3, CNII-XII intact on gross examination  Vascular/Extremities: Warm, well perfused        RLE: Dressings in place, blistering and ulceration on the dorsal aspect of the foot        LLE: First digit dry gangrene on the plantar aspect    --------------------------------------------------------------------------------------    IMAGING:   CHARMAINE/PVR (12/16/24):  The blood pressure measurements at the right and left upper calfs, and at   the right and left ankles in both the posterior tibial and dorsal pedis   arteries are all in excess of 240 mmHg.    Artificially elevated blood pressure measurements are characteristic for   calcified, noncompressible, diabetic arteries. As such, meaningful   ankle-brachial indices cannot be obtained.    The blood pressure measurements were not obtained at the right and left   toes.    The amplitude of the pulse volume recordings bilaterally are normal or   near normal at the levels of the right and the left lower thighs, upper   calfs and ankles.    The amplitude of the pulse volume recordings are reduced at the level of   the right toes and more severely reduced at the levels of theleft   metatarsals and toes.    IMPRESSION:    The quality of this examination is adversely impacted by the   noncompressible nature of the calcified, noncompressible diabetic   arteries.    The reduced amplitude of the pulse volume recordings at theleft   metatarsals and toes is evidence for disease affecting the small arteries   of the left foot.

## 2025-02-04 NOTE — PROGRESS NOTE ADULT - ASSESSMENT
64y.o. Female with PAD, CLTI affecting b/l LE, CHF, chronic b/l LE wound R worsen then the L, with R foot blistering and ulceration was transfer to [facility] for CO2 angiogram. Currently patient Cr is uptrending, not medically optimized for the procedure.     Recommendations:  - Patient ultrasound reviewed  - Severe bilateral below knee disease  - Continue ASA/statin  - Cardiology optimization and risk stratification documented  - Medical/nephrology optimization/risk stratification pending  - If Cre continues to downtrend, plan for angiogram Friday (2/7)  - Vascular following    Vascular Surgery  [id number]

## 2025-02-04 NOTE — PROGRESS NOTE ADULT - ASSESSMENT
64y Female with history of CHF presents as a transfer for LE CO2 angiogram. Nephrology consulted for elevated Scr.    1) JAMEL: likely due to ATN in setting of infection/hypotension. Scr improving. UA relatively bland. FeUrea low consistent with low flow state. Renal US unremarkable. Bladder scan on 2/3 negative.  gtt @ 2.5 mcg as per cardiology. Avoid nephrotoxins.    2) HTN: BP low normal. Metoprolol as per cardiology. Holding ARNI.    3) LE edema: Not secondary to nephrotic syndrome given subnephrotic range proteinuria. Will start lasix 40 mg IV daily. F/U final CXR read. TTE with moderately decreased LVSF. Monitor UO.    4) Hyperphosphatemia: Resolved. Continue with renal diet.       [facility] NEPHROLOGY  Raji Waterman M.D.  ABDI BorregoO.  Kelley Parks M.D.  MD Nancy Kulkarni, MSN, ANP-C    Telephone: [phone]  Facsimile: [phone] [place] [address], [address]  [place] [id number]

## 2025-02-04 NOTE — PROGRESS NOTE ADULT - ASSESSMENT
64F, hx of CHF (last TTE on 1/16/25 EF 30-35%, G2DD), DM, diabetic foot ulcer with a recent admission at [facility] for CHF exacerbation 1/14-1/17 p/w worsening R. leg pain and fluid secretion, was meeting sepsis criteria with podiatry having low suspicion for right cellulitis and admitted for continued management of HF exacerbation and needing ischemic eval.     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  -Left leg Arterial Duplex: Slightly tardus parvus waveform of the left popliteal artery is noted, for which underlying disease cannot be excluded. Posterior tibial artery waveform is nonpulsatile. Anterior tibial artery tardus parvus flow is noted.   Transferred to [facility] for CO2 angiogram as per vascular surgery    #  PAD Wound of lower extremity.   ·  Plan: Podiatry following, b/l serous bullae, no concerns for infection  Found to have RLE coolness  -Right Leg Arterial Duplex: Popliteal artery is occluded with negligible flow of right trifurcation arteries.  -Left leg Arterial Duplex: Slightly tardus parvus waveform of the left popliteal artery is noted, for which underlying disease cannot be excluded. Posterior tibial artery waveform is nonpulsatile. Anterior tibial artery tardus parvus flow is noted.  -Started on heparin gtt and dobutamine gtt -Will transfer to [facility] for CO2 angiogram as per vascular surgery as not candidate for CTA due to worsening SCr  -Keep compression dressing-LE elevation above heart level at rest.  -Transferred to [facility] for CO2 angiogram   - Overall this patient is at   intermediate  risk (for cardiac death, nonfatal myocardial infarction, and nonfatal cardiac arrest perioperatively for this intermediate  risk procedure).   No cardiac contraindications for CO2 vangiogram  There  are  no further recommendation for risk stratifying imaging/stress testing prior to planned surgery      # HFrEF (congestive heart failure).   ·  Plan: Hx of HF, previous admission in January, on home Lasix 40 qD, Metoprolol Succ 25 qD. Not on Entresto due to insurance issues  Last TTE: LVSF moderately decreased w/ EF 30-35 %. Moderate G2DD. Mild MR  Stress test: small-sized, moderate defect(s) in the apical wall that is predominantly fixed suggestive of an infarction with minimal marcus-infarct ischemia  Ischemic cardiomyopathy  GDMT on hold 2/2 JAMEL  Continue Dobutamine at present increased to 5 mcg/Kg/Min      # JAMEL  Creatinine Trend: 3.90<--4.40<--4.27<--3.60<--, 2.36<--, 1.55<--, 1.25<--, 1.25<--, 1.17<--  Cardiorenal   Renal consult noted  Creatinine downtrending hopefully will continue trend

## 2025-02-05 LAB
ADD ON TEST-SPECIMEN IN LAB: SIGNIFICANT CHANGE UP
ANION GAP SERPL CALC-SCNC: 18 MMOL/L — HIGH (ref 5–17)
APTT BLD: 100.3 SEC — HIGH (ref 24.5–35.6)
APTT BLD: 119.5 SEC — HIGH (ref 24.5–35.6)
APTT BLD: 91.1 SEC — HIGH (ref 24.5–35.6)
BUN SERPL-MCNC: 55 MG/DL — HIGH (ref 7–23)
CALCIUM SERPL-MCNC: 9.2 MG/DL — SIGNIFICANT CHANGE UP (ref 8.4–10.5)
CHLORIDE SERPL-SCNC: 97 MMOL/L — SIGNIFICANT CHANGE UP (ref 96–108)
CO2 SERPL-SCNC: 22 MMOL/L — SIGNIFICANT CHANGE UP (ref 22–31)
CREAT SERPL-MCNC: 2.82 MG/DL — HIGH (ref 0.5–1.3)
EGFR: 18 ML/MIN/1.73M2 — LOW
EGFR: 18 ML/MIN/1.73M2 — LOW
GLUCOSE BLDC GLUCOMTR-MCNC: 155 MG/DL — HIGH (ref 70–99)
GLUCOSE BLDC GLUCOMTR-MCNC: 172 MG/DL — HIGH (ref 70–99)
GLUCOSE BLDC GLUCOMTR-MCNC: 192 MG/DL — HIGH (ref 70–99)
GLUCOSE BLDC GLUCOMTR-MCNC: 196 MG/DL — HIGH (ref 70–99)
GLUCOSE SERPL-MCNC: 142 MG/DL — HIGH (ref 70–99)
HCT VFR BLD CALC: 26 % — LOW (ref 34.5–45)
HGB BLD-MCNC: 9.1 G/DL — LOW (ref 11.5–15.5)
MAGNESIUM SERPL-MCNC: 2.1 MG/DL — SIGNIFICANT CHANGE UP (ref 1.6–2.6)
MCHC RBC-ENTMCNC: 24.7 PG — LOW (ref 27–34)
MCHC RBC-ENTMCNC: 35 G/DL — SIGNIFICANT CHANGE UP (ref 32–36)
MCV RBC AUTO: 70.5 FL — LOW (ref 80–100)
NRBC # BLD: 0 /100 WBCS — SIGNIFICANT CHANGE UP (ref 0–0)
NRBC BLD-RTO: 0 /100 WBCS — SIGNIFICANT CHANGE UP (ref 0–0)
PHOSPHATE SERPL-MCNC: 3.9 MG/DL — SIGNIFICANT CHANGE UP (ref 2.5–4.5)
PLATELET # BLD AUTO: 314 K/UL — SIGNIFICANT CHANGE UP (ref 150–400)
POTASSIUM SERPL-MCNC: 3.6 MMOL/L — SIGNIFICANT CHANGE UP (ref 3.5–5.3)
POTASSIUM SERPL-SCNC: 3.6 MMOL/L — SIGNIFICANT CHANGE UP (ref 3.5–5.3)
RBC # BLD: 3.69 M/UL — LOW (ref 3.8–5.2)
RBC # FLD: 15.9 % — HIGH (ref 10.3–14.5)
SODIUM SERPL-SCNC: 137 MMOL/L — SIGNIFICANT CHANGE UP (ref 135–145)
WBC # BLD: 7.85 K/UL — SIGNIFICANT CHANGE UP (ref 3.8–10.5)
WBC # FLD AUTO: 7.85 K/UL — SIGNIFICANT CHANGE UP (ref 3.8–10.5)

## 2025-02-05 PROCEDURE — 99232 SBSQ HOSP IP/OBS MODERATE 35: CPT

## 2025-02-05 RX ORDER — OXYCODONE HYDROCHLORIDE 30 MG/1
5 TABLET ORAL EVERY 6 HOURS
Refills: 0 | Status: DISCONTINUED | OUTPATIENT
Start: 2025-02-05 | End: 2025-02-07

## 2025-02-05 RX ORDER — BISACODYL 5 MG
5 TABLET, DELAYED RELEASE (ENTERIC COATED) ORAL EVERY 12 HOURS
Refills: 0 | Status: DISCONTINUED | OUTPATIENT
Start: 2025-02-05 | End: 2025-03-17

## 2025-02-05 RX ORDER — BUMETANIDE 1 MG/1
1 TABLET ORAL
Qty: 20 | Refills: 0 | Status: DISCONTINUED | OUTPATIENT
Start: 2025-02-05 | End: 2025-02-07

## 2025-02-05 RX ORDER — INSULIN LISPRO 100 U/ML
2 INJECTION, SOLUTION INTRAVENOUS; SUBCUTANEOUS
Refills: 0 | Status: DISCONTINUED | OUTPATIENT
Start: 2025-02-05 | End: 2025-02-12

## 2025-02-05 RX ORDER — HEPARIN SODIUM 1000 [USP'U]/ML
650 INJECTION INTRAVENOUS; SUBCUTANEOUS
Qty: 25000 | Refills: 0 | Status: DISCONTINUED | OUTPATIENT
Start: 2025-02-05 | End: 2025-02-07

## 2025-02-05 RX ORDER — BUMETANIDE 1 MG/1
1 TABLET ORAL ONCE
Refills: 0 | Status: COMPLETED | OUTPATIENT
Start: 2025-02-05 | End: 2025-02-05

## 2025-02-05 RX ORDER — BISACODYL 5 MG
10 TABLET, DELAYED RELEASE (ENTERIC COATED) ORAL ONCE
Refills: 0 | Status: COMPLETED | OUTPATIENT
Start: 2025-02-05 | End: 2025-02-05

## 2025-02-05 RX ADMIN — Medication 650 MILLIGRAM(S): at 13:37

## 2025-02-05 RX ADMIN — BUMETANIDE 1 MILLIGRAM(S): 1 TABLET ORAL at 11:26

## 2025-02-05 RX ADMIN — OXYCODONE HYDROCHLORIDE 5 MILLIGRAM(S): 30 TABLET ORAL at 01:49

## 2025-02-05 RX ADMIN — HEPARIN SODIUM 5.5 UNIT(S)/HR: 1000 INJECTION INTRAVENOUS; SUBCUTANEOUS at 17:25

## 2025-02-05 RX ADMIN — HEPARIN SODIUM 6.5 UNIT(S)/HR: 1000 INJECTION INTRAVENOUS; SUBCUTANEOUS at 02:28

## 2025-02-05 RX ADMIN — METOPROLOL SUCCINATE 25 MILLIGRAM(S): 50 TABLET, EXTENDED RELEASE ORAL at 06:15

## 2025-02-05 RX ADMIN — INSULIN LISPRO 1: 100 INJECTION, SOLUTION INTRAVENOUS; SUBCUTANEOUS at 17:26

## 2025-02-05 RX ADMIN — Medication 10 MILLIGRAM(S): at 12:15

## 2025-02-05 RX ADMIN — INSULIN LISPRO 2 UNIT(S): 100 INJECTION, SOLUTION INTRAVENOUS; SUBCUTANEOUS at 12:12

## 2025-02-05 RX ADMIN — HEPARIN SODIUM 5.5 UNIT(S)/HR: 1000 INJECTION INTRAVENOUS; SUBCUTANEOUS at 10:22

## 2025-02-05 RX ADMIN — INSULIN LISPRO 1: 100 INJECTION, SOLUTION INTRAVENOUS; SUBCUTANEOUS at 12:10

## 2025-02-05 RX ADMIN — OXYCODONE HYDROCHLORIDE 5 MILLIGRAM(S): 30 TABLET ORAL at 15:51

## 2025-02-05 RX ADMIN — HEPARIN SODIUM 6.5 UNIT(S)/HR: 1000 INJECTION INTRAVENOUS; SUBCUTANEOUS at 07:18

## 2025-02-05 RX ADMIN — Medication 20 MILLIEQUIVALENT(S): at 11:25

## 2025-02-05 RX ADMIN — INSULIN LISPRO 2 UNIT(S): 100 INJECTION, SOLUTION INTRAVENOUS; SUBCUTANEOUS at 17:26

## 2025-02-05 RX ADMIN — INSULIN GLARGINE-YFGN 18 UNIT(S): 100 INJECTION, SOLUTION SUBCUTANEOUS at 21:48

## 2025-02-05 RX ADMIN — BUMETANIDE 5 MG/HR: 1 TABLET ORAL at 12:09

## 2025-02-05 RX ADMIN — OXYCODONE HYDROCHLORIDE 5 MILLIGRAM(S): 30 TABLET ORAL at 00:49

## 2025-02-05 RX ADMIN — Medication 20 MILLIEQUIVALENT(S): at 13:37

## 2025-02-05 RX ADMIN — Medication 40 MILLIGRAM(S): at 11:26

## 2025-02-05 RX ADMIN — ATORVASTATIN CALCIUM 40 MILLIGRAM(S): 80 TABLET, FILM COATED ORAL at 21:48

## 2025-02-05 RX ADMIN — Medication 650 MILLIGRAM(S): at 14:37

## 2025-02-05 RX ADMIN — DOBUTAMINE 5.41 MICROGRAM(S)/KG/MIN: 250 INJECTION INTRAVENOUS at 07:38

## 2025-02-05 RX ADMIN — Medication 81 MILLIGRAM(S): at 11:25

## 2025-02-05 RX ADMIN — HEPARIN SODIUM 6.5 UNIT(S)/HR: 1000 INJECTION INTRAVENOUS; SUBCUTANEOUS at 07:37

## 2025-02-05 RX ADMIN — AMPICILLIN SODIUM AND SULBACTAM SODIUM 200 GRAM(S): 1; .5 INJECTION, POWDER, FOR SOLUTION INTRAMUSCULAR; INTRAVENOUS at 11:25

## 2025-02-05 RX ADMIN — HEPARIN SODIUM 5.5 UNIT(S)/HR: 1000 INJECTION INTRAVENOUS; SUBCUTANEOUS at 20:01

## 2025-02-05 RX ADMIN — FUROSEMIDE 40 MILLIGRAM(S): 10 INJECTION INTRAMUSCULAR; INTRAVENOUS at 05:44

## 2025-02-05 RX ADMIN — INSULIN LISPRO 1: 100 INJECTION, SOLUTION INTRAVENOUS; SUBCUTANEOUS at 09:17

## 2025-02-05 RX ADMIN — OXYCODONE HYDROCHLORIDE 5 MILLIGRAM(S): 30 TABLET ORAL at 16:51

## 2025-02-05 NOTE — PROGRESS NOTE ADULT - SUBJECTIVE AND OBJECTIVE BOX
Surgery Progress Note    SUBJECTIVE:  - Patient seen and examined at bedside   --------------------------------------------------------------------------------------------------  OBJECTIVE:   Physical Exam:  General: NAD, resting in bed comfortably  Cardiac: Regular rate, normotensive  Respiratory: Nonlabored respirations, normal cw expansion, on RA  Neuro: AOx3, CNII-XII intact on gross examination  Vascular/Extremities: Warm, well perfused        RLE: Dressings in place, blistering and ulceration on the dorsal aspect of the foot        LLE: First digit dry gangrene on the plantar aspect  --------------------------------------------------------------------------------------------------  V/S:  Vital Signs Last 24 Hrs  T(C): 36.5 (05 Feb 2025 09:21), Max: 37 (04 Feb 2025 21:30)  T(F): 97.7 (05 Feb 2025 09:21), Max: 98.6 (04 Feb 2025 21:30)  HR: 72 (05 Feb 2025 09:21) (72 - 94)  BP: 100/65 (05 Feb 2025 09:21) (100/65 - 124/76)  BP(mean): --  RR: 18 (05 Feb 2025 09:21) (18 - 18)  SpO2: 95% (05 Feb 2025 09:21) (92% - 95%)    Parameters below as of 05 Feb 2025 09:21  Patient On (Oxygen Delivery Method): room air        --------------------------------------------------------------------------------------------------  I/Os:    04 Feb 2025 07:01  -  05 Feb 2025 07:00  --------------------------------------------------------  IN:    DOBUTamine: 136.1 mL    Heparin: 137 mL    Heparin: 32.5 mL    IV PiggyBack: 50 mL    Oral Fluid: 830 mL  Total IN: 1185.6 mL    OUT:    Voided (mL): 1000 mL  Total OUT: 1000 mL    Total NET: 185.6 mL        --------------------------------------------------------------------------------------------------  LABS:                        9.1    7.85  )-----------( 314      ( 05 Feb 2025 08:54 )             26.0     05 Feb 2025 08:54    137    |  97     |  55     ----------------------------<  142    3.6     |  22     |  2.82     Ca    9.2        05 Feb 2025 08:54  Phos  4.0       04 Feb 2025 07:33  Mg     2.1       04 Feb 2025 07:33    TPro  7.7    /  Alb  4.5    /  TBili  0.5    /  DBili  x      /  AST  27     /  ALT  21     /  AlkPhos  59     04 Feb 2025 07:33    PTT - ( 05 Feb 2025 08:54 )  PTT:119.5 sec  CAPILLARY BLOOD GLUCOSE      POCT Blood Glucose.: 155 mg/dL (05 Feb 2025 08:45)  POCT Blood Glucose.: 198 mg/dL (04 Feb 2025 21:56)  POCT Blood Glucose.: 219 mg/dL (04 Feb 2025 17:29)  POCT Blood Glucose.: 280 mg/dL (04 Feb 2025 12:11)        LIVER FUNCTIONS - ( 04 Feb 2025 07:33 )  Alb: 4.5 g/dL / Pro: 7.7 g/dL / ALK PHOS: 59 U/L / ALT: 21 U/L / AST: 27 U/L / GGT: x             Urinalysis Basic - ( 05 Feb 2025 08:54 )    Color: x / Appearance: x / SG: x / pH: x  Gluc: 142 mg/dL / Ketone: x  / Bili: x / Urobili: x   Blood: x / Protein: x / Nitrite: x   Leuk Esterase: x / RBC: x / WBC x   Sq Epi: x / Non Sq Epi: x / Bacteria: x      --------------------------------------------------------------------------------------------------  MEDICATIONS  (STANDING):  albumin human 25% IVPB 50 milliLiter(s) IV Intermittent every 6 hours  ampicillin/sulbactam  IVPB 3 Gram(s) IV Intermittent daily  ampicillin/sulbactam  IVPB      aspirin enteric coated 81 milliGRAM(s) Oral daily  atorvastatin 40 milliGRAM(s) Oral at bedtime  benzocaine/menthol Lozenge 1 Lozenge Oral once  dextrose 5%. 1000 milliLiter(s) (100 mL/Hr) IV Continuous <Continuous>  dextrose 5%. 1000 milliLiter(s) (50 mL/Hr) IV Continuous <Continuous>  dextrose 50% Injectable 25 Gram(s) IV Push once  dextrose 50% Injectable 12.5 Gram(s) IV Push once  dextrose 50% Injectable 25 Gram(s) IV Push once  DOBUTamine Infusion 2.5 MICROgram(s)/kG/Min (5.41 mL/Hr) IV Continuous <Continuous>  furosemide   Injectable 40 milliGRAM(s) IV Push daily  glucagon  Injectable 1 milliGRAM(s) IntraMuscular once  heparin  Infusion 650 Unit(s)/Hr (6.5 mL/Hr) IV Continuous <Continuous>  insulin glargine Injectable (LANTUS) 18 Unit(s) SubCutaneous at bedtime  insulin lispro (ADMELOG) corrective regimen sliding scale   SubCutaneous three times a day before meals  insulin lispro (ADMELOG) corrective regimen sliding scale   SubCutaneous at bedtime  metoprolol succinate ER 25 milliGRAM(s) Oral daily  pantoprazole  Injectable 40 milliGRAM(s) IV Push daily  polyethylene glycol 3350 17 Gram(s) Oral daily  senna 2 Tablet(s) Oral at bedtime    MEDICATIONS  (PRN):  acetaminophen     Tablet .. 650 milliGRAM(s) Oral every 6 hours PRN Mild Pain (1 - 3)  dextrose Oral Gel 15 Gram(s) Oral once PRN Blood Glucose LESS THAN 70 milliGRAM(s)/deciliter  melatonin 3 milliGRAM(s) Oral at bedtime PRN Insomnia  ondansetron Injectable 4 milliGRAM(s) IV Push every 6 hours PRN Nausea and/or Vomiting  oxyCODONE    IR 5 milliGRAM(s) Oral every 6 hours PRN Severe Pain (7 - 10)    --------------------------------------------------------------------------------------------------

## 2025-02-05 NOTE — PROGRESS NOTE ADULT - SUBJECTIVE AND OBJECTIVE BOX
Patient seen today for follow up inpatient Diabetes Mellitus management.    Chief Complaint: Type 2 Diabetes Mellitus     INTERVAL HX:  Patient seen in [facility] 9MON 912 W1. Patient is alert and oriented, sitting up in chair. Patient remains on oxygen Dobutamine and Bumex gtts. Patient reports she is feeling okay. Reports she has low appetite at times, sometimes eats about 50% of meal and sometimes does not eat meal at all, low PO intake noted. FBG stable this am to 155mg/dL. No hypoglycemia. Blood glucose levels in the last 24hrs have been 155-280mg/dL.     Review of Systems:  General: As above.  Respiratory: Denies any SOB, GARG, or cough.  Gastrointestinal: Denies any n/v/d or abdominal pain.   Endocrine: Denies any polyuria, polydipsia, polyphagia, visual changes, or numbness in feet.     Allergies  Augmentin (Stomach Upset; Vomiting; Nausea)  No Known Allergies      Intolerances  None.       MEDICATIONS  (STANDING):  acetaminophen     Tablet .. 650 milliGRAM(s) Oral every 6 hours PRN  albumin human 25% IVPB 50 milliLiter(s) IV Intermittent every 6 hours  ampicillin/sulbactam  IVPB      ampicillin/sulbactam  IVPB 3 Gram(s) IV Intermittent daily  aspirin enteric coated 81 milliGRAM(s) Oral daily  atorvastatin 40 milliGRAM(s) Oral at bedtime  benzocaine/menthol Lozenge 1 Lozenge Oral once  bisacodyl 5 milliGRAM(s) Oral every 12 hours  buMETAnide Infusion 1 mG/Hr IV Continuous <Continuous>  dextrose 5%. 1000 milliLiter(s) IV Continuous <Continuous>  dextrose 5%. 1000 milliLiter(s) IV Continuous <Continuous>  dextrose 50% Injectable 25 Gram(s) IV Push once  dextrose 50% Injectable 12.5 Gram(s) IV Push once  dextrose 50% Injectable 25 Gram(s) IV Push once  dextrose Oral Gel 15 Gram(s) Oral once PRN  DOBUTamine Infusion 2.5 MICROgram(s)/kG/Min IV Continuous <Continuous>  glucagon  Injectable 1 milliGRAM(s) IntraMuscular once  heparin  Infusion 650 Unit(s)/Hr IV Continuous <Continuous>  insulin glargine Injectable (LANTUS) 18 Unit(s) SubCutaneous at bedtime  insulin lispro (ADMELOG) corrective regimen sliding scale   SubCutaneous three times a day before meals  insulin lispro (ADMELOG) corrective regimen sliding scale   SubCutaneous at bedtime  melatonin 3 milliGRAM(s) Oral at bedtime PRN  metoprolol succinate ER 25 milliGRAM(s) Oral daily  ondansetron Injectable 4 milliGRAM(s) IV Push every 6 hours PRN  oxyCODONE    IR 5 milliGRAM(s) Oral every 6 hours PRN  pantoprazole  Injectable 40 milliGRAM(s) IV Push daily  polyethylene glycol 3350 17 Gram(s) Oral daily  potassium chloride    Tablet ER 20 milliEquivalent(s) Oral every 2 hours  senna 2 Tablet(s) Oral at bedtime      atorvastatin 40 milliGRAM(s) Oral at bedtime  dextrose 50% Injectable 25 Gram(s) IV Push once  dextrose 50% Injectable 12.5 Gram(s) IV Push once  dextrose 50% Injectable 25 Gram(s) IV Push once  dextrose Oral Gel 15 Gram(s) Oral once PRN  glucagon  Injectable 1 milliGRAM(s) IntraMuscular once  insulin glargine Injectable (LANTUS) 18 Unit(s) SubCutaneous at bedtime  insulin lispro (ADMELOG) corrective regimen sliding scale   SubCutaneous three times a day before meals  insulin lispro (ADMELOG) corrective regimen sliding scale   SubCutaneous at bedtime      insulin lispro (ADMELOG) corrective regimen sliding scale   SubCutaneous three times a day before meals  insulin lispro (ADMELOG) corrective regimen sliding scale   SubCutaneous at bedtime      PHYSICAL EXAM:  VITALS:   T(C): 36.5 (02-05-25 @ 09:21), Max: 37 (02-04-25 @ 21:30)  HR: 72 (02-05-25 @ 09:21) (72 - 94)  BP: 100/65 (02-05-25 @ 09:21) (100/65 - 124/76)  RR: 18 (02-05-25 @ 09:21) (18 - 18)  SpO2: 95% (02-05-25 @ 09:21) (92% - 95%)    GENERAL: In no acute distress  Respiratory: Respirations unlabored, on oxygen, +cough  Extremities: Warm and dry, +BLE edema  NEURO: Alert and oriented, appropriate     LABS:  POCT Blood Glucose.: 155 mg/dL (02-05-25 @ 08:45)  POCT Blood Glucose.: 198 mg/dL (02-04-25 @ 21:56)  POCT Blood Glucose.: 219 mg/dL (02-04-25 @ 17:29)  POCT Blood Glucose.: 280 mg/dL (02-04-25 @ 12:11)  POCT Blood Glucose.: 128 mg/dL (02-04-25 @ 08:51)  POCT Blood Glucose.: 183 mg/dL (02-03-25 @ 22:36)  POCT Blood Glucose.: 116 mg/dL (02-03-25 @ 18:03)  POCT Blood Glucose.: 159 mg/dL (02-03-25 @ 13:04)  POCT Blood Glucose.: 166 mg/dL (02-03-25 @ 09:53)  POCT Blood Glucose.: 185 mg/dL (02-02-25 @ 21:18)  POCT Blood Glucose.: 159 mg/dL (02-02-25 @ 17:17)  POCT Blood Glucose.: 175 mg/dL (02-02-25 @ 12:14)                          9.1    7.85  )-----------( 314      ( 05 Feb 2025 08:54 )             26.0     02-05    137  |  97  |  55[H]  ----------------------------<  142[H]  3.6   |  22  |  2.82[H]    Ca    9.2      05 Feb 2025 08:54  Phos  4.0     02-04  Mg     2.1     02-04    TPro  7.7  /  Alb  4.5  /  TBili  0.5  /  DBili  x   /  AST  27  /  ALT  21  /  AlkPhos  59  02-04    LIVER FUNCTIONS - ( 04 Feb 2025 07:33 )  Alb: 4.5 g/dL / Pro: 7.7 g/dL / ALK PHOS: 59 U/L / ALT: 21 U/L / AST: 27 U/L / GGT: x           PTT - ( 05 Feb 2025 08:54 )  PTT:119.5 sec      Urinalysis Basic - ( 05 Feb 2025 08:54 )    Color: x / Appearance: x / SG: x / pH: x  Gluc: 142 mg/dL / Ketone: x  / Bili: x / Urobili: x   Blood: x / Protein: x / Nitrite: x   Leuk Esterase: x / RBC: x / WBC x   Sq Epi: x / Non Sq Epi: x / Bacteria: x      A1C with Estimated Average Glucose Result: A1C with Estimated Average Glucose Result: 11.6 % (01-24-25 @ 05:40)  A1C with Estimated Average Glucose Result: >15.5 % (12-13-24 @ 06:49)

## 2025-02-05 NOTE — PROGRESS NOTE ADULT - ASSESSMENT
64F, hx of CHF (last TTE on 1/16/25 EF 30-35%, G2DD), DM, diabetic foot ulcer with a recent admission at [facility] for CHF exacerbation 1/14-1/17 p/w worsening R. leg pain and fluid secretion, was meeting sepsis criteria with podiatry having low suspicion for right cellulitis and admitted for continued management of HF exacerbation and needing ischemic eval.     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  -Left leg Arterial Duplex: Slightly tardus parvus waveform of the left popliteal artery is noted, for which underlying disease cannot be excluded. Posterior tibial artery waveform is nonpulsatile. Anterior tibial artery tardus parvus flow is noted.   Transferred to [facility] for CO2 angiogram as per vascular surgery    #  PAD Wound of lower extremity.   ·  Plan: Podiatry following, b/l serous bullae, no concerns for infection  Found to have RLE coolness  -Right Leg Arterial Duplex: Popliteal artery is occluded with negligible flow of right trifurcation arteries.  -Left leg Arterial Duplex: Slightly tardus parvus waveform of the left popliteal artery is noted, for which underlying disease cannot be excluded. Posterior tibial artery waveform is nonpulsatile. Anterior tibial artery tardus parvus flow is noted.  -Started on heparin gtt and dobutamine gtt -Will transfer to [facility] for CO2 angiogram as per vascular surgery as not candidate for CTA due to worsening SCr  -Keep compression dressing-LE elevation above heart level at rest.  -Transferred to [facility] for CO2 angiogram   - Overall this patient is at   intermediate  risk (for cardiac death, nonfatal myocardial infarction, and nonfatal cardiac arrest perioperatively for this intermediate  risk procedure).   No cardiac contraindications for CO2 vangiogram  There  are  no further recommendation for risk stratifying imaging/stress testing prior to planned surgery      # HFrEF (congestive heart failure).   ·  Plan: Hx of HF, previous admission in January, on home Lasix 40 qD, Metoprolol Succ 25 qD. Not on Entresto due to insurance issues  Last TTE: LVSF moderately decreased w/ EF 30-35 %. Moderate G2DD. Mild MR  Stress test: small-sized, moderate defect(s) in the apical wall that is predominantly fixed suggestive of an infarction with minimal marcus-infarct ischemia  Ischemic cardiomyopathy  GDMT on hold 2/2 JAMEL  Continue Dobutamine at present increased to 5 mcg/Kg/Min      # JAMEL  Creatinine Trend: 3.90<--4.40<--4.27<--3.60<--, 2.36<--, 1.55<--, 1.25<--, 1.25<--, 1.17<--  Cardiorenal   Renal consult noted  Creatinine downtrending hopefully will continue trend

## 2025-02-05 NOTE — PROGRESS NOTE ADULT - SUBJECTIVE AND OBJECTIVE BOX
MR#[id number]  PATIENT NAME:COLE ALBA    DATE OF SERVICE: 02-05-25 @ 0930  Patient was seen and examined by Yordy Gilmore MD on    02-05-25 @ 0930  Interim events noted.Consultant notes ,Labs,Telemetry reviewed by me       HOSPITAL COURSE: HPI:  64F, hx of CHF (last TTE on 1/16/25 EF 30-35%, G2DD), DM, diabetic foot ulcer with a recent admission at [facility] for CHF exacerbation presented as a transfer for worsening R. leg pain and fluid secretion, On non-invasive imaging demonstrating severe depletion of RLE blood flow beyond the popliteal vessel at knee and similar findings in L. Was transferred to [facility] for CO2 angiogram with Dr. Baltazar. (29 Jan 2025 20:05)      INTERIM EVENTS:Patient seen at bedside ,interim events noted.      PMH -reviewed admission note, no change since admission  HEART FAILURE: Acute[ ]Chronic[ ] Systolic[ ] Diastolic[ ] Combined Systolic and Diastolic[ ]  CAD[ ] CABG[ ] PCI[ ]  DEVICES[ ] PPM[ ] ICD[ ] ILR[ ]  ATRIAL FIBRILLATION[ ] Paroxysmal[ ] Permanent[ ] CHADS2-[  ]  JAMEL[ ] CKD1[ ] CKD2[ ] CKD3[ ] CKD4[ ] ESRD[ ]  COPD[ ] HTN[ ]   DM[ ] Type1[ ] Type 2[ ]   CVA[ ] Paresis[ ]    AMBULATION: Assisted[ ] Cane/walker[ ] Independent[ ]    MEDICATIONS  (STANDING):  albumin human 25% IVPB 50 milliLiter(s) IV Intermittent every 6 hours  ampicillin/sulbactam  IVPB      ampicillin/sulbactam  IVPB 3 Gram(s) IV Intermittent daily  aspirin enteric coated 81 milliGRAM(s) Oral daily  atorvastatin 40 milliGRAM(s) Oral at bedtime  benzocaine/menthol Lozenge 1 Lozenge Oral once  bisacodyl 5 milliGRAM(s) Oral every 12 hours  buMETAnide Infusion 1 mG/Hr (5 mL/Hr) IV Continuous <Continuous>  dextrose 5%. 1000 milliLiter(s) (100 mL/Hr) IV Continuous <Continuous>  dextrose 5%. 1000 milliLiter(s) (50 mL/Hr) IV Continuous <Continuous>  dextrose 50% Injectable 25 Gram(s) IV Push once  dextrose 50% Injectable 12.5 Gram(s) IV Push once  dextrose 50% Injectable 25 Gram(s) IV Push once  DOBUTamine Infusion 2.5 MICROgram(s)/kG/Min (5.41 mL/Hr) IV Continuous <Continuous>  glucagon  Injectable 1 milliGRAM(s) IntraMuscular once  heparin  Infusion 650 Unit(s)/Hr (5.5 mL/Hr) IV Continuous <Continuous>  insulin glargine Injectable (LANTUS) 18 Unit(s) SubCutaneous at bedtime  insulin lispro (ADMELOG) corrective regimen sliding scale   SubCutaneous three times a day before meals  insulin lispro (ADMELOG) corrective regimen sliding scale   SubCutaneous at bedtime  insulin lispro Injectable (ADMELOG) 2 Unit(s) SubCutaneous three times a day with meals  metoprolol succinate ER 25 milliGRAM(s) Oral daily  pantoprazole  Injectable 40 milliGRAM(s) IV Push daily  polyethylene glycol 3350 17 Gram(s) Oral daily  senna 2 Tablet(s) Oral at bedtime    MEDICATIONS  (PRN):  acetaminophen     Tablet .. 650 milliGRAM(s) Oral every 6 hours PRN Mild Pain (1 - 3)  dextrose Oral Gel 15 Gram(s) Oral once PRN Blood Glucose LESS THAN 70 milliGRAM(s)/deciliter  melatonin 3 milliGRAM(s) Oral at bedtime PRN Insomnia  ondansetron Injectable 4 milliGRAM(s) IV Push every 6 hours PRN Nausea and/or Vomiting  oxyCODONE    IR 5 milliGRAM(s) Oral every 6 hours PRN Severe Pain (7 - 10)            REVIEW OF SYSTEMS:  Constitutional: [ ] fever, [ ]weight loss,  [ ]fatigue [ ]weight gain  Eyes: [ ] visual changes  Respiratory: [ ]shortness of breath;  [ ] cough, [ ]wheezing, [ ]chills, [ ]hemoptysis  Cardiovascular: [ ] chest pain, [ ]palpitations, [ ]dizziness,  [ ]leg swelling[ ]orthopnea[ ]PND  Gastrointestinal: [ ] abdominal pain, [ ]nausea, [ ]vomiting,  [ ]diarrhea [ ]Constipation [ ]Melena  Genitourinary: [ ] dysuria, [ ] hematuria [ ][place]  Neurologic: [ ] headaches [ ] tremors[ ]weakness [ ]Paralysis Right[ ] Left[ ]  Skin: [ ] itching, [ ]burning, [ ] rashes  Endocrine: [ ] heat or cold intolerance  Musculoskeletal: [ ] joint pain or swelling; [ ] muscle, back, or extremity pain  Psychiatric: [ ] depression, [ ]anxiety, [ ]mood swings, or [ ]difficulty sleeping  Hematologic: [ ] easy bruising, [ ] bleeding gums    [ ] All remaining systems negative except as per above.   [ ]Unable to obtain.  [x] No change in ROS since admission      Vital Signs Last 24 Hrs  T(C): 36.6 (05 Feb 2025 21:42), Max: 37.1 (05 Feb 2025 13:47)  T(F): 97.9 (05 Feb 2025 21:42), Max: 98.8 (05 Feb 2025 13:47)  HR: 88 (05 Feb 2025 21:42) (72 - 88)  BP: 122/74 (05 Feb 2025 21:42) (100/65 - 124/76)  BP(mean): --  RR: 18 (05 Feb 2025 21:42) (18 - 18)  SpO2: 93% (05 Feb 2025 21:42) (92% - 95%)    Parameters below as of 05 Feb 2025 21:42  Patient On (Oxygen Delivery Method): room air      I&O's Summary    04 Feb 2025 07:01  -  05 Feb 2025 07:00  --------------------------------------------------------  IN: 1185.6 mL / OUT: 1000 mL / NET: 185.6 mL    05 Feb 2025 07:01  -  05 Feb 2025 22:15  --------------------------------------------------------  IN: 672.8 mL / OUT: 800 mL / NET: -127.2 mL        PHYSICAL EXAM:  General: No acute distress BMI-  HEENT: EOMI, PERRL  Neck: Supple, [ ] JVD  Lungs: Equal air entry bilaterally; [ ] rales [ ] wheezing [ ] rhonchi  Heart: Regular rate and rhythm; [x ] murmur   2/6 [ x] systolic [ ] diastolic [ ] radiation[ ] rubs [ ]  gallops  Abdomen: Nontender, bowel sounds present  Extremities: No clubbing, cyanosis, [ ] edema [ ]Pulses  equal and intact  Nervous system:  Alert & Oriented X3, no focal deficits  Psychiatric: Normal affect  Skin: No rashes or lesions    LABS:  02-05    137  |  97  |  55[H]  ----------------------------<  142[H]  3.6   |  22  |  2.82[H]    Ca    9.2      05 Feb 2025 08:54  Phos  3.9     02-05  Mg     2.1     02-05    TPro  7.7  /  Alb  4.5  /  TBili  0.5  /  DBili  x   /  AST  27  /  ALT  21  /  AlkPhos  59  02-04    Creatinine Trend: 2.82<--, 2.98<--, 3.31<--, 2.65<--, 3.90<--, 4.40<--                        9.1    7.85  )-----------( 314      ( 05 Feb 2025 08:54 )             26.0     PTT - ( 05 Feb 2025 16:42 )  PTT:91.1 sec

## 2025-02-05 NOTE — PROGRESS NOTE ADULT - PROBLEM SELECTOR PLAN 1
Inpatient Plan:  - Check BG TID AC and HS while on PO diet  - C/w Lantus 18u QHS  - Start Admelog 2u TID AC (HOLD if NPO or eating <50% of meal)  - C/q low dose Admelog correctional scales TID AC and HS    Discharge Planning:   - Likely basal/bolus regimen given kidney function (doses TBD closer to d/c). Not a candidate for SGLT2 due to leg ulcers and also significantly decreased EGFR.   - Patient should check BG TID AC and HS at home. Can use CGM to monitor. Please tell patient to contact Endocrinologist if BG <70mg/dL x1 or >200mg/dL consistently or >400mg/dL x1.  - Endocrine follow up: can f/u with Dr. Grace, Endocrinology.   - Recommend routine outpatient ophthalmology and podiatry follow up.    Pt will need RX for basal insulin pen (ie. Basaglar, Lantus, Tresiba, Toujeo) and bolus insulin pen (ie. humalog/novolog/admelog) depending on insurance coverage; please send test scripts to see which is covered. Please send prescriptions for diabetes supplies (glucometer, test strips, lancets, alcohol swabs, insulin pen needles).

## 2025-02-05 NOTE — PROGRESS NOTE ADULT - ASSESSMENT
TOMASZ ALBA is a 64y old woman with history of Type 2 DM, HTN, HLD, CAD, PAD, transferred here for CO2 angiogram, endocrine consulted for Type 2 DM management in the setting of poorly controlled Type 2 DM. Patient reports feeling okay. Is having low PO intake at times, sometimes eating 50% or more of meal but not all the time. FBG stable, no hypoglycemia. Noted postprandial hyperglycemia yesterday to 280mg/dL -> will add low dose mealtime insulin, to be held if patient NPO or eating <50% of meal. Patient continues on Dobutamine and Bumex gtts. Endocrine will closely monitor BG and adjust insulin as needed for BG goal 100-180mg/dL inpatient.     1. Type 2 DM  A1C:A1C with Estimated Average Glucose Result: 11.6 % (01-24-25 @ 05:40)  A1C with Estimated Average Glucose Result: >15.5 % (12-13-24 @ 06:49)  Home regimen: Lantus 30 units, CeQur insulin patch about 2-10 units TID with meals   Endocrinologist: Dr Grace   EGFR: eGFR: 15 mL/min/1.73m2 (02-03-25 @ 07:11)

## 2025-02-05 NOTE — PROGRESS NOTE ADULT - ASSESSMENT
64y.o. Female with PAD, CLTI affecting b/l LE, CHF, chronic b/l LE wound R worsen then the L, with R foot blistering and ulceration was transfer to [facility] for CO2 angiogram. Currently patient Cr is elevated, not medically optimized for the procedure. Patient is currently followed by medicine and cardiology.     Recommendations:  - Patient ultrasound reviewed  - Severe bilateral below knee disease  - Continue ASA/statin  - Cardiology optimization and risk stratification documented  - Medical/nephrology optimization/risk stratification pending  - If Cre continues to downtrend, plan for angiogram Friday (2/7)  - Vascular following    Vascular Surgery  [id number]

## 2025-02-05 NOTE — PROGRESS NOTE ADULT - ASSESSMENT
63 YO F with PMHx of HFrEF 30-35 and grade 2 ddfxn (last TTE on 01/16/25), DM2, and diabetic foot ulcer. Recent admission at [facility] for ADHF. Patient now represents to OSH and ultimately to [facility] as a transfer for worsening right leg pain and fluid secretion. As per documentation, patient was reported to have severe depletion of RLE blood flow beyond the popliteal vessel at knee and similar findings in the left. Patient was transferred to [facility] for CO2 angiogram with Dr. Baltazar. Of note, patient also with reported visual disturbance for which patient was seen and evaluated by opthalmology. Internal Medicine has been consulted on Ms. Kirby's care for medical management.     # ADHF  - Recent admission at [facility] for ADHF  - TTE 1/16 with EF 30-35 with moderately reduced LVSF and grade 2 ddfxn, normal RVSF , trace TR/ TX, and trace pericardial effusion  - NST abnormal with small-sized, moderate defect in apical wall that is predominantly fixed suggestive of an infarction with minimal marcus-infarct ischemia. Post stress LVEF 25.  - CT Chest with small BL pleural effusions and small pericardial effusion.  - GDMT as per Cardio with Toprol XL 25 PO Qd   - In view of JAMEL further GDMT medications currently held   - Continue on dobutamine GTT 2.5mcg per cards   - Continue on bumex 1mg GTT per cards   - Dobutamine and bumex adjustments per cards   - Monitor I and O   - Monitor volume status   - Check daily weights    - Monitor on telemetry   - Cardio eval appreciated    # BL LE Edema likely in setting of ADHF  - Diuresis as per Cardio and Renal   - BL LE elevation and compression  - Monitor for now    # Pericardial effusion likely in setting ADHF  - TTE with trace pericardial effusion   - CT Chest with small pericardial effusion   - Denies chest pain, palpitations, chest tightness or discomfort, shortness of breath or dyspnea   - Repeat TTE in 1 month for further evaluation   - Diuresis per cardio/ renal.  - Monitor on telemetry  - Cardiofollowing    # Pleural effusion likely in setting ADHF  - CT Chest with small BL pleural effusions  - On RA with no respiratory complaints at present  - Monitor O2 saturation  - Supplement to maintain > 90%   - Diuresis per cardio/ renal.     # JAMEL with Hyponatremia and Metabolic Acidosis   - As per OSH documentation, JAMEL was thought to be second to Unasyn/ Bumex / Entresto use and Hypotension   - US RENAL with no hydronephrosis  - CRE improving with diuresis/ dobutamine and likely cardiorenal induced with low flow state in ADHF  - Avoid nephrotoxic agents  - Monitor Cr and daily BMP  - Avoid overcorrection of Na > 6-8 mEq in 24 hours   - Albumin per renal   - Renal eval appreciated    # Leukocytosis likely in setting of BL LE ulcers with pop occlusion   - BCx negative   - UCx with probable contamination   - On unasyn and then broadened to zosyn for ABX   - Trend CBC, temp curve, VS and adjust as tolerated    # Foot ulcers with worsening RLE pain second to pop artery occlusion +/- diabetic ulcers   - RLE Arterial Duplex with popliteal artery occlusion   - LLE Arterial Duplex with underlying disease cannot be excluded   - Patient transferred to [facility] for CO2 angiogram, however given ADHF currently not medically optimized and high risk. Plan to continue on diuresis and dobutamine as above and tentatively on schedule for angiogram on friday 2/7 pending cardiac clearance/ medical optimization.   - Continue on Lipitor  - Continue on Heparin GTT   - Monitor PLT and HH while on AC   - Vascular following  - Wound care called for dressing recs     Tachycardia  - ? Pain related  - On Toprol   - Continue to monitor on tele   - Monitor and replete electrolytes to maintain K > 4 and Mg > 2  - Cardio eval appreciated; F/u recs  - improved tachycardia      Visual Disturbance  - CT Head w/ old infarcts  - On ASA and Statin   - Opthalmology eval appreciated; F/u recs    Indigestion, Constipation   - PPI ordered  - Bowel regimen - Miralax and Senna  - Serial abdominal examinations  - Monitor     Anemia   - Anemia labs - Iron, B12, Folate, Ferritin noted   - On Hep gtt as above; Monitor   - Transfuse for Hgb < 7.5  - Maintain active T/S; Monitor H/H; Large bore IV access    Diabetes Mellitus   - A1C 11.6   - C/w Sliding scale, Lantus (If NPO, reduce Lantus)   - Diabetic, DASH Diet  - Monitor glucose levels; Adjust insulin regimen as tolerated   - BHB down-trended to WNL   - ENDO eval consulted; F/u recs , appreciated   - Patient will require outpatient Endo, Renal, Optho and Podiatry follow ups upon DC    HLD  - Check Lipid panel   - On Lipitor 40     HTN   - On Toprol XL as above   - On Dobutamine gtt at present   - Monitor BP, VS and adjust as tolerated; Reported to be hypotensive at OSH as per documentation     DISPO TBD     Patient seen and examined by me. patient care and plan discussed and reviewed with PA. Plan as outlined above edited by me to reflect our discussion. Advanced care planning/advanced directives discussed with patient/family. DNR status including forceful chest compressions to attempt to restart the heart, ventilator support/artificial breathing, electric shock, artificial nutrition, health care proxy, Molst form all discussed with pt. Sixty seven minutes of total time dedicated to this patient visit today including preparing to see the patient (eg. review of tests), obtaining and/or reviewing separately obtained history, obtaining/reviewing vitals, performing a medically appropriate examination and/or evaluation, counseling and educating the patient/family/caregiver, reviewing previous notes and test results, and procedures, communicating with other health professionals (when not separately reported), and documenting clinical information in the electronic health record.  65 YO F with PMHx of HFrEF 30-35 and grade 2 ddfxn (last TTE on 01/16/25), DM2, and diabetic foot ulcer. Recent admission at [facility] for ADHF. Patient now represents to OSH and ultimately to [facility] as a transfer for worsening right leg pain and fluid secretion. As per documentation, patient was reported to have severe depletion of RLE blood flow beyond the popliteal vessel at knee and similar findings in the left. Patient was transferred to [facility] for CO2 angiogram with Dr. Baltazar. Of note, patient also with reported visual disturbance for which patient was seen and evaluated by opthalmology. Internal Medicine has been consulted on Ms. Kirby's care for medical management.     # ADHF  - Recent admission at [facility] for ADHF  - TTE 1/16 with EF 30-35 with moderately reduced LVSF and grade 2 ddfxn, normal RVSF , trace TR/ VT, and trace pericardial effusion  - NST abnormal with small-sized, moderate defect in apical wall that is predominantly fixed suggestive of an infarction with minimal marcus-infarct ischemia. Post stress LVEF 25.  - CT Chest with small BL pleural effusions and small pericardial effusion.  - GDMT as per Cardio with Toprol XL 25 PO Qd   - In view of JAMEL further GDMT medications currently held   - Continue on dobutamine GTT 2.5mcg per cards, however if CRE does not improve then plan to increase to 5mcg.  - Continue on bumex 1mg GTT per cards   - Dobutamine and bumex adjustments per cards   - Monitor I and O   - Monitor volume status   - Check daily weights    - Monitor on telemetry   - Cardio eval appreciated    # BL LE Edema likely in setting of ADHF  - Diuresis as per Cardio and Renal   - BL LE elevation and compression  - Monitor for now    # Pericardial effusion likely in setting ADHF  - TTE with trace pericardial effusion   - CT Chest with small pericardial effusion   - Denies chest pain, palpitations, chest tightness or discomfort, shortness of breath or dyspnea   - Repeat TTE in 1 month for further evaluation   - Diuresis per cardio/ renal.  - Monitor on telemetry  - Cardio following    # Pleural effusion likely in setting ADHF  - CT Chest with small BL pleural effusions  - On RA with no respiratory complaints at present  - Monitor O2 saturation  - Supplement to maintain > 90%   - Diuresis per cardio/ renal.     # JAMEL with Hyponatremia and Metabolic Acidosis   - As per OSH documentation, JAMEL was thought to be second to Unasyn/ Bumex / Entresto use and Hypotension   - US RENAL with no hydronephrosis  - CRE improving slightly with diuresis/ dobutamine and likely cardiorenal induced with low flow state in ADHF  - Avoid nephrotoxic agents  - Monitor Cr and daily BMP  - Avoid overcorrection of Na > 6-8 mEq in 24 hours   - Albumin per renal   - Renal eval appreciated    # Leukocytosis likely in setting of BL LE ulcers with pop occlusion   - BCx negative   - UCx with probable contamination   - On unasyn and then broadened to zosyn for ABX   - Trend CBC, temp curve, VS and adjust as tolerated    # Foot ulcers with worsening RLE pain second to pop artery occlusion +/- diabetic ulcers   - RLE Arterial Duplex with popliteal artery occlusion   - LLE Arterial Duplex with underlying disease cannot be excluded   - Patient transferred to [facility] for CO2 angiogram, however given ADHF currently not medically optimized and high risk. Plan to continue on diuresis and dobutamine as above and tentatively on schedule for angiogram on friday 2/7 pending cardiac clearance/ medical optimization.   - Continue on Heparin GTT for now  - Continue on Lipitor  - Monitor PLT and HH while on AC   - Vascular following  - Wound care called for dressing recs     # Tachycardia likely pain related   - On Toprol and improved  - Continue to monitor on tele   - Cardio eval appreciated    # Visual Disturbance  - CT Head w/ old infarcts  - On ASA and Statin   - Opthalmology eval appreciated    # Indigestion and Constipation   - PPI, miralax and senna continued  - MOnitor BMs    # Anemia likely mixed AOCD vs iron deficiency   - HH stable in 9s on heparin GGT   - Consider iron tabs when optimized   - Monitor HH   - Transfuse for Hgb < 8  - Maintain active T/S    # Diabetes Mellitus A1C 11.6   - Continue on lantus 18 with lispro 2 and ISS   - Endocrine following    # HLD and HLD  - Continue on lipitor and toprol  - Monitor BP    # DISPO TBD    Case discussed with Dr Lu     Patient seen and examined by me. patient care and plan discussed and reviewed with PA. Plan as outlined above edited by me to reflect our discussion. Advanced care planning/advanced directives discussed with patient/family. DNR status including forceful chest compressions to attempt to restart the heart, ventilator support/artificial breathing, electric shock, artificial nutrition, health care proxy, Molst form all discussed with pt. Sixty seven minutes of total time dedicated to this patient visit today including preparing to see the patient (eg. review of tests), obtaining and/or reviewing separately obtained history, obtaining/reviewing vitals, performing a medically appropriate examination and/or evaluation, counseling and educating the patient/family/caregiver, reviewing previous notes and test results, and procedures, communicating with other health professionals (when not separately reported), and documenting clinical information in the electronic health record.  63 YO F with PMHx of HFrEF 30-35 and grade 2 ddfxn (last TTE on 01/16/25), DM2, and diabetic foot ulcer. Recent admission at [facility] for ADHF. Patient now represents to OSH and ultimately to [facility] as a transfer for worsening right leg pain and fluid secretion. As per documentation, patient was reported to have severe depletion of RLE blood flow beyond the popliteal vessel at knee and similar findings in the left. Patient was transferred to [facility] for CO2 angiogram with Dr. Baltazar. Of note, patient also with reported visual disturbance for which patient was seen and evaluated by opthalmology. Internal Medicine has been consulted on Ms. Kirby's care for medical management.     # ADHF  - Recent admission at [facility] for ADHF  - TTE 1/16 with EF 30-35 with moderately reduced LVSF and grade 2 ddfxn, normal RVSF , trace TR/ MA, and trace pericardial effusion  - NST abnormal with small-sized, moderate defect in apical wall that is predominantly fixed suggestive of an infarction with minimal marcus-infarct ischemia. Post stress LVEF 25.  - CT Chest with small BL pleural effusions and small pericardial effusion.  - GDMT as per Cardio with Toprol XL 25 PO Qd   - In view of JAMEL further GDMT medications currently held   - Continue on dobutamine GTT 2.5mcg per cards, however if CRE does not improve then plan to increase to 5mcg.  - Continue on bumex 1mg GTT per renal team   - Monitor I and O   - Monitor volume status   - Check daily weights    - Monitor on telemetry   - Cardio eval appreciated    # BL LE Edema likely in setting of ADHF  - Diuresis as per Cardio and Renal   - BL LE elevation and compression  - Monitor for now    # Pericardial effusion likely in setting ADHF  - TTE with trace pericardial effusion   - CT Chest with small pericardial effusion   - Denies chest pain, palpitations, chest tightness or discomfort, shortness of breath or dyspnea   - Repeat TTE in 1 month for further evaluation   - Diuresis per cardio/ renal.  - Monitor on telemetry  - Cardio following    # Pleural effusion likely in setting ADHF  - CT Chest with small BL pleural effusions  - On RA with no respiratory complaints at present  - Monitor O2 saturation  - Supplement to maintain > 90%   - Diuresis per cardio/ renal.     # JAMEL with Hyponatremia and Metabolic Acidosis   - As per OSH documentation, JAMEL was thought to be second to Unasyn/ Bumex / Entresto use and Hypotension   - US RENAL with no hydronephrosis  - CRE improving slightly with diuresis/ dobutamine and likely cardiorenal induced with low flow state in ADHF  - Avoid nephrotoxic agents  - Monitor Cr and daily BMP  - Avoid overcorrection of Na > 6-8 mEq in 24 hours   - Albumin per renal   - Renal eval appreciated    # Leukocytosis likely in setting of BL LE ulcers with pop occlusion   - BCx negative   - UCx with probable contamination   - On unasyn and then broadened to zosyn for ABX   - Trend CBC, temp curve, VS and adjust as tolerated    # Foot ulcers with worsening RLE pain second to pop artery occlusion +/- diabetic ulcers   - RLE Arterial Duplex with popliteal artery occlusion   - LLE Arterial Duplex with underlying disease cannot be excluded   - Patient transferred to [facility] for CO2 angiogram, however given ADHF currently not medically optimized and high risk. Plan to continue on diuresis and dobutamine as above and tentatively on schedule for angiogram on friday 2/7 pending cardiac clearance/ medical optimization.   - Continue on Heparin GTT for now  - Continue on Lipitor  - Monitor PLT and HH while on AC   - Vascular following  - Wound care called for dressing recs     # Tachycardia likely pain related   - On Toprol and improved  - Continue to monitor on tele   - Cardio eval appreciated    # Visual Disturbance  - CT Head w/ old infarcts  - On ASA and Statin   - Opthalmology eval appreciated    # Indigestion and Constipation   - PPI, miralax and senna continued  - MOnitor BMs    # Anemia likely mixed AOCD vs iron deficiency   - HH stable in 9s on heparin GGT   - Consider iron tabs when optimized   - Monitor HH   - Transfuse for Hgb < 8  - Maintain active T/S    # Diabetes Mellitus A1C 11.6   - Continue on lantus 18 with lispro 2 and ISS   - Endocrine following    # HLD and HLD  - Continue on lipitor and toprol  - Monitor BP    # DISPO TBD    Case discussed with Dr Lu

## 2025-02-05 NOTE — PROGRESS NOTE ADULT - SUBJECTIVE AND OBJECTIVE BOX
INTERNAL MEDICINE PROGRESS NOTE     NAME OF PATIENT: TOMASZ ALBA  MRN: [id number]  DATE OF VISIT: 02-05-25 @ 11:57    SUBJECTIVE/ ROS:  - Patient seen and examined by bedside with no current complaints.    OBJECTIVE:  ICU Vital Signs Last 24 Hrs  T(C): 36.5 (05 Feb 2025 09:21), Max: 37 (04 Feb 2025 21:30)  T(F): 97.7 (05 Feb 2025 09:21), Max: 98.6 (04 Feb 2025 21:30)  HR: 78 (05 Feb 2025 11:25) (72 - 94)  BP: 106/65 (05 Feb 2025 11:25) (100/65 - 124/76)  BP(mean): --  ABP: --  ABP(mean): --  RR: 18 (05 Feb 2025 09:21) (18 - 18)  SpO2: 95% (05 Feb 2025 09:21) (92% - 95%)    O2 Parameters below as of 05 Feb 2025 09:21  Patient On (Oxygen Delivery Method): room air          02-05-25 @ 11:57  T(C): 36.5 (02-05-25 @ 09:21), Max: 37 (02-04-25 @ 21:30)  HR: 78 (02-05-25 @ 11:25) (72 - 94)  BP: 106/65 (02-05-25 @ 11:25) (100/65 - 124/76)  RR: 18 (02-05-25 @ 09:21) (18 - 18)  SpO2: 95% (02-05-25 @ 09:21) (92% - 95%)  Wt(kg): --  CAPILLARY BLOOD GLUCOSE      POCT Blood Glucose.: 155 mg/dL (05 Feb 2025 08:45)      HOSPITAL MEDICATIONS:  MEDICATIONS  (STANDING):  albumin human 25% IVPB 50 milliLiter(s) IV Intermittent every 6 hours  ampicillin/sulbactam  IVPB      ampicillin/sulbactam  IVPB 3 Gram(s) IV Intermittent daily  aspirin enteric coated 81 milliGRAM(s) Oral daily  atorvastatin 40 milliGRAM(s) Oral at bedtime  benzocaine/menthol Lozenge 1 Lozenge Oral once  bisacodyl 5 milliGRAM(s) Oral every 12 hours  buMETAnide Infusion 1 mG/Hr (5 mL/Hr) IV Continuous <Continuous>  dextrose 5%. 1000 milliLiter(s) (100 mL/Hr) IV Continuous <Continuous>  dextrose 5%. 1000 milliLiter(s) (50 mL/Hr) IV Continuous <Continuous>  dextrose 50% Injectable 25 Gram(s) IV Push once  dextrose 50% Injectable 12.5 Gram(s) IV Push once  dextrose 50% Injectable 25 Gram(s) IV Push once  DOBUTamine Infusion 2.5 MICROgram(s)/kG/Min (5.41 mL/Hr) IV Continuous <Continuous>  glucagon  Injectable 1 milliGRAM(s) IntraMuscular once  heparin  Infusion 650 Unit(s)/Hr (5.5 mL/Hr) IV Continuous <Continuous>  insulin glargine Injectable (LANTUS) 18 Unit(s) SubCutaneous at bedtime  insulin lispro (ADMELOG) corrective regimen sliding scale   SubCutaneous three times a day before meals  insulin lispro (ADMELOG) corrective regimen sliding scale   SubCutaneous at bedtime  metoprolol succinate ER 25 milliGRAM(s) Oral daily  pantoprazole  Injectable 40 milliGRAM(s) IV Push daily  polyethylene glycol 3350 17 Gram(s) Oral daily  potassium chloride    Tablet ER 20 milliEquivalent(s) Oral every 2 hours  senna 2 Tablet(s) Oral at bedtime    MEDICATIONS  (PRN):  acetaminophen     Tablet .. 650 milliGRAM(s) Oral every 6 hours PRN Mild Pain (1 - 3)  dextrose Oral Gel 15 Gram(s) Oral once PRN Blood Glucose LESS THAN 70 milliGRAM(s)/deciliter  melatonin 3 milliGRAM(s) Oral at bedtime PRN Insomnia  ondansetron Injectable 4 milliGRAM(s) IV Push every 6 hours PRN Nausea and/or Vomiting  oxyCODONE    IR 5 milliGRAM(s) Oral every 6 hours PRN Severe Pain (7 - 10)      PHYSICAL EXAMINATION:    LABS:                        9.1    7.85  )-----------( 314      ( 05 Feb 2025 08:54 )             26.0     02-05    137  |  97  |  55[H]  ----------------------------<  142[H]  3.6   |  22  |  2.82[H]    Ca    9.2      05 Feb 2025 08:54  Phos  4.0     02-04  Mg     2.1     02-04    TPro  7.7  /  Alb  4.5  /  TBili  0.5  /  DBili  x   /  AST  27  /  ALT  21  /  AlkPhos  59  02-04    PTT - ( 05 Feb 2025 08:54 )  PTT:119.5 sec      MICROBIOLOGY:     RADIOLOGY:    CARDIOLOGY:           INTERNAL MEDICINE PROGRESS NOTE     NAME OF PATIENT: TOMASZ ALBA  MRN: [id number]  DATE OF VISIT: 02-05-25 @ 11:57    SUBJECTIVE/ ROS:  - Patient seen and examined by bedside with no current complaints.    OBJECTIVE:  ICU Vital Signs Last 24 Hrs  T(C): 36.5 (05 Feb 2025 09:21), Max: 37 (04 Feb 2025 21:30)  T(F): 97.7 (05 Feb 2025 09:21), Max: 98.6 (04 Feb 2025 21:30)  HR: 78 (05 Feb 2025 11:25) (72 - 94)  BP: 106/65 (05 Feb 2025 11:25) (100/65 - 124/76)  BP(mean): --  ABP: --  ABP(mean): --  RR: 18 (05 Feb 2025 09:21) (18 - 18)  SpO2: 95% (05 Feb 2025 09:21) (92% - 95%)    O2 Parameters below as of 05 Feb 2025 09:21  Patient On (Oxygen Delivery Method): room air          02-05-25 @ 11:57  T(C): 36.5 (02-05-25 @ 09:21), Max: 37 (02-04-25 @ 21:30)  HR: 78 (02-05-25 @ 11:25) (72 - 94)  BP: 106/65 (02-05-25 @ 11:25) (100/65 - 124/76)  RR: 18 (02-05-25 @ 09:21) (18 - 18)  SpO2: 95% (02-05-25 @ 09:21) (92% - 95%)  Wt(kg): --  CAPILLARY BLOOD GLUCOSE      POCT Blood Glucose.: 155 mg/dL (05 Feb 2025 08:45)      HOSPITAL MEDICATIONS:  MEDICATIONS  (STANDING):  albumin human 25% IVPB 50 milliLiter(s) IV Intermittent every 6 hours  ampicillin/sulbactam  IVPB      ampicillin/sulbactam  IVPB 3 Gram(s) IV Intermittent daily  aspirin enteric coated 81 milliGRAM(s) Oral daily  atorvastatin 40 milliGRAM(s) Oral at bedtime  benzocaine/menthol Lozenge 1 Lozenge Oral once  bisacodyl 5 milliGRAM(s) Oral every 12 hours  buMETAnide Infusion 1 mG/Hr (5 mL/Hr) IV Continuous <Continuous>  dextrose 5%. 1000 milliLiter(s) (100 mL/Hr) IV Continuous <Continuous>  dextrose 5%. 1000 milliLiter(s) (50 mL/Hr) IV Continuous <Continuous>  dextrose 50% Injectable 25 Gram(s) IV Push once  dextrose 50% Injectable 12.5 Gram(s) IV Push once  dextrose 50% Injectable 25 Gram(s) IV Push once  DOBUTamine Infusion 2.5 MICROgram(s)/kG/Min (5.41 mL/Hr) IV Continuous <Continuous>  glucagon  Injectable 1 milliGRAM(s) IntraMuscular once  heparin  Infusion 650 Unit(s)/Hr (5.5 mL/Hr) IV Continuous <Continuous>  insulin glargine Injectable (LANTUS) 18 Unit(s) SubCutaneous at bedtime  insulin lispro (ADMELOG) corrective regimen sliding scale   SubCutaneous three times a day before meals  insulin lispro (ADMELOG) corrective regimen sliding scale   SubCutaneous at bedtime  metoprolol succinate ER 25 milliGRAM(s) Oral daily  pantoprazole  Injectable 40 milliGRAM(s) IV Push daily  polyethylene glycol 3350 17 Gram(s) Oral daily  potassium chloride    Tablet ER 20 milliEquivalent(s) Oral every 2 hours  senna 2 Tablet(s) Oral at bedtime    MEDICATIONS  (PRN):  acetaminophen     Tablet .. 650 milliGRAM(s) Oral every 6 hours PRN Mild Pain (1 - 3)  dextrose Oral Gel 15 Gram(s) Oral once PRN Blood Glucose LESS THAN 70 milliGRAM(s)/deciliter  melatonin 3 milliGRAM(s) Oral at bedtime PRN Insomnia  ondansetron Injectable 4 milliGRAM(s) IV Push every 6 hours PRN Nausea and/or Vomiting  oxyCODONE    IR 5 milliGRAM(s) Oral every 6 hours PRN Severe Pain (7 - 10)      PHYSICAL EXAMINATION:  General: NAD   Cards: S1/S2, no murmurs   Pulm: CTA bilaterally. No wheezes.   Abdomen: Soft, nondistended and nontender. BS (+)   Extremities: No pedal edema.   Neurology: Awake with no acute focal neurological deficits     LABS:                        9.1    7.85  )-----------( 314      ( 05 Feb 2025 08:54 )             26.0     02-05    137  |  97  |  55[H]  ----------------------------<  142[H]  3.6   |  22  |  2.82[H]    Ca    9.2      05 Feb 2025 08:54  Phos  4.0     02-04  Mg     2.1     02-04    TPro  7.7  /  Alb  4.5  /  TBili  0.5  /  DBili  x   /  AST  27  /  ALT  21  /  AlkPhos  59  02-04    PTT - ( 05 Feb 2025 08:54 )  PTT:119.5 sec      MICROBIOLOGY:     RADIOLOGY:    CARDIOLOGY:

## 2025-02-05 NOTE — PROGRESS NOTE ADULT - SUBJECTIVE AND OBJECTIVE BOX
[facility] NEPHROLOGY- PROGRESS NOTE    64y Female with history of CHF presents as a transfer for LE CO2 angiogram. Nephrology consulted for elevated Scr.    REVIEW OF SYSTEMS:  Gen: no fevers  Cards: no chest pain  Resp: + dyspnea with exertion, + cough  GI: no nausea and vomiting this morning, no diarrhea  Vascular: + LE edema    No Known Allergies      Hospital Medications: Medications reviewed      VITALS:  T(F): 97.7 (02-05-25 @ 09:21), Max: 98.6 (02-04-25 @ 21:30)  HR: 72 (02-05-25 @ 09:21)  BP: 100/65 (02-05-25 @ 09:21)  RR: 18 (02-05-25 @ 09:21)  SpO2: 95% (02-05-25 @ 09:21)  Wt(kg): --    02-04 @ 07:01  -  02-05 @ 07:00  --------------------------------------------------------  IN: 1185.6 mL / OUT: 1000 mL / NET: 185.6 mL        PHYSICAL EXAM:    Gen: NAD, calm  Cards: RRR, +S1/S2, no M/G/R  Resp: bibasilar rales  GI: soft, NT/ND, NABS  Vascular: 2+ RLE edema > LLE edema      LABS:  02-05    137  |  97  |  55[H]  ----------------------------<  142[H]  3.6   |  22  |  2.82[H]    Ca    9.2      05 Feb 2025 08:54  Phos  4.0     02-04  Mg     2.1     02-04    TPro  7.7  /  Alb  4.5  /  TBili  0.5  /  DBili      /  AST  27  /  ALT  21  /  AlkPhos  59  02-04    Creatinine Trend: 2.82 <--, 2.98 <--, 3.31 <--, 2.65 <--, 3.90 <--, 4.40 <--, 4.27 <--                        9.1    7.85  )-----------( 314      ( 05 Feb 2025 08:54 )             26.0     Urine Studies:  Urinalysis Basic - ( 05 Feb 2025 08:54 )    Color:  / Appearance:  / SG:  / pH:   Gluc: 142 mg/dL / Ketone:   / Bili:  / Urobili:    Blood:  / Protein:  / Nitrite:    Leuk Esterase:  / RBC:  / WBC    Sq Epi:  / Non Sq Epi:  / Bacteria:       Creatinine, Random Urine: 33 mg/dL (01-30 @ 20:41)  Protein/Creatinine Ratio Calculation: 1.1 Ratio (01-30 @ 20:41)  Creatinine, Random Urine: 102 mg/dL (01-29 @ 11:53)        < from: Xray Chest 1 View- PORTABLE-Routine (Xray Chest 1 View- PORTABLE-Routine .) (02.03.25 @ 11:18) >  IMPRESSION:    Mild pulmonary edema and small bilateral pleural effusions.    --- End of Report ---      < end of copied text >

## 2025-02-06 LAB
ANION GAP SERPL CALC-SCNC: 16 MMOL/L — SIGNIFICANT CHANGE UP (ref 5–17)
APTT BLD: 74.1 SEC — HIGH (ref 24.5–35.6)
APTT BLD: 76.4 SEC — HIGH (ref 24.5–35.6)
BUN SERPL-MCNC: 52 MG/DL — HIGH (ref 7–23)
CALCIUM SERPL-MCNC: 9.3 MG/DL — SIGNIFICANT CHANGE UP (ref 8.4–10.5)
CHLORIDE SERPL-SCNC: 96 MMOL/L — SIGNIFICANT CHANGE UP (ref 96–108)
CO2 SERPL-SCNC: 25 MMOL/L — SIGNIFICANT CHANGE UP (ref 22–31)
CREAT SERPL-MCNC: 2.56 MG/DL — HIGH (ref 0.5–1.3)
EGFR: 20 ML/MIN/1.73M2 — LOW
EGFR: 20 ML/MIN/1.73M2 — LOW
GLUCOSE BLDC GLUCOMTR-MCNC: 165 MG/DL — HIGH (ref 70–99)
GLUCOSE BLDC GLUCOMTR-MCNC: 172 MG/DL — HIGH (ref 70–99)
GLUCOSE BLDC GLUCOMTR-MCNC: 177 MG/DL — HIGH (ref 70–99)
GLUCOSE BLDC GLUCOMTR-MCNC: 181 MG/DL — HIGH (ref 70–99)
GLUCOSE SERPL-MCNC: 179 MG/DL — HIGH (ref 70–99)
HCT VFR BLD CALC: 28.1 % — LOW (ref 34.5–45)
HGB BLD-MCNC: 9.6 G/DL — LOW (ref 11.5–15.5)
MAGNESIUM SERPL-MCNC: 1.9 MG/DL — SIGNIFICANT CHANGE UP (ref 1.6–2.6)
MCHC RBC-ENTMCNC: 24.6 PG — LOW (ref 27–34)
MCHC RBC-ENTMCNC: 34.2 G/DL — SIGNIFICANT CHANGE UP (ref 32–36)
MCV RBC AUTO: 71.9 FL — LOW (ref 80–100)
NRBC # BLD: 0 /100 WBCS — SIGNIFICANT CHANGE UP (ref 0–0)
NRBC BLD-RTO: 0 /100 WBCS — SIGNIFICANT CHANGE UP (ref 0–0)
PHOSPHATE SERPL-MCNC: 3.5 MG/DL — SIGNIFICANT CHANGE UP (ref 2.5–4.5)
PLATELET # BLD AUTO: 318 K/UL — SIGNIFICANT CHANGE UP (ref 150–400)
POTASSIUM SERPL-MCNC: 3.8 MMOL/L — SIGNIFICANT CHANGE UP (ref 3.5–5.3)
POTASSIUM SERPL-SCNC: 3.8 MMOL/L — SIGNIFICANT CHANGE UP (ref 3.5–5.3)
RBC # BLD: 3.91 M/UL — SIGNIFICANT CHANGE UP (ref 3.8–5.2)
RBC # FLD: 15.9 % — HIGH (ref 10.3–14.5)
SODIUM SERPL-SCNC: 137 MMOL/L — SIGNIFICANT CHANGE UP (ref 135–145)
WBC # BLD: 9.39 K/UL — SIGNIFICANT CHANGE UP (ref 3.8–10.5)
WBC # FLD AUTO: 9.39 K/UL — SIGNIFICANT CHANGE UP (ref 3.8–10.5)

## 2025-02-06 PROCEDURE — 99232 SBSQ HOSP IP/OBS MODERATE 35: CPT

## 2025-02-06 RX ORDER — AMPICILLIN SODIUM AND SULBACTAM SODIUM 1; .5 G/1; G/1
3 INJECTION, POWDER, FOR SOLUTION INTRAMUSCULAR; INTRAVENOUS ONCE
Refills: 0 | Status: COMPLETED | OUTPATIENT
Start: 2025-02-06 | End: 2025-02-06

## 2025-02-06 RX ORDER — AMPICILLIN SODIUM AND SULBACTAM SODIUM 1; .5 G/1; G/1
3 INJECTION, POWDER, FOR SOLUTION INTRAMUSCULAR; INTRAVENOUS EVERY 12 HOURS
Refills: 0 | Status: DISCONTINUED | OUTPATIENT
Start: 2025-02-06 | End: 2025-02-28

## 2025-02-06 RX ORDER — AMPICILLIN SODIUM AND SULBACTAM SODIUM 1; .5 G/1; G/1
INJECTION, POWDER, FOR SOLUTION INTRAMUSCULAR; INTRAVENOUS
Refills: 0 | Status: DISCONTINUED | OUTPATIENT
Start: 2025-02-06 | End: 2025-02-28

## 2025-02-06 RX ADMIN — INSULIN LISPRO 2 UNIT(S): 100 INJECTION, SOLUTION INTRAVENOUS; SUBCUTANEOUS at 09:00

## 2025-02-06 RX ADMIN — INSULIN LISPRO 2 UNIT(S): 100 INJECTION, SOLUTION INTRAVENOUS; SUBCUTANEOUS at 17:39

## 2025-02-06 RX ADMIN — OXYCODONE HYDROCHLORIDE 5 MILLIGRAM(S): 30 TABLET ORAL at 00:31

## 2025-02-06 RX ADMIN — HEPARIN SODIUM 5.5 UNIT(S)/HR: 1000 INJECTION INTRAVENOUS; SUBCUTANEOUS at 07:17

## 2025-02-06 RX ADMIN — INSULIN LISPRO 1: 100 INJECTION, SOLUTION INTRAVENOUS; SUBCUTANEOUS at 08:59

## 2025-02-06 RX ADMIN — Medication 5 MILLIGRAM(S): at 17:39

## 2025-02-06 RX ADMIN — OXYCODONE HYDROCHLORIDE 5 MILLIGRAM(S): 30 TABLET ORAL at 00:01

## 2025-02-06 RX ADMIN — METOPROLOL SUCCINATE 25 MILLIGRAM(S): 50 TABLET, EXTENDED RELEASE ORAL at 05:16

## 2025-02-06 RX ADMIN — BUMETANIDE 5 MG/HR: 1 TABLET ORAL at 21:57

## 2025-02-06 RX ADMIN — OXYCODONE HYDROCHLORIDE 5 MILLIGRAM(S): 30 TABLET ORAL at 06:18

## 2025-02-06 RX ADMIN — Medication 2 TABLET(S): at 21:41

## 2025-02-06 RX ADMIN — AMPICILLIN SODIUM AND SULBACTAM SODIUM 200 GRAM(S): 1; .5 INJECTION, POWDER, FOR SOLUTION INTRAMUSCULAR; INTRAVENOUS at 21:41

## 2025-02-06 RX ADMIN — OXYCODONE HYDROCHLORIDE 5 MILLIGRAM(S): 30 TABLET ORAL at 17:06

## 2025-02-06 RX ADMIN — BUMETANIDE 5 MG/HR: 1 TABLET ORAL at 05:10

## 2025-02-06 RX ADMIN — OXYCODONE HYDROCHLORIDE 5 MILLIGRAM(S): 30 TABLET ORAL at 16:06

## 2025-02-06 RX ADMIN — INSULIN GLARGINE-YFGN 18 UNIT(S): 100 INJECTION, SOLUTION SUBCUTANEOUS at 21:41

## 2025-02-06 RX ADMIN — ATORVASTATIN CALCIUM 40 MILLIGRAM(S): 80 TABLET, FILM COATED ORAL at 21:41

## 2025-02-06 RX ADMIN — INSULIN LISPRO 1: 100 INJECTION, SOLUTION INTRAVENOUS; SUBCUTANEOUS at 12:54

## 2025-02-06 RX ADMIN — OXYCODONE HYDROCHLORIDE 5 MILLIGRAM(S): 30 TABLET ORAL at 06:48

## 2025-02-06 RX ADMIN — OXYCODONE HYDROCHLORIDE 5 MILLIGRAM(S): 30 TABLET ORAL at 21:52

## 2025-02-06 RX ADMIN — INSULIN LISPRO 2 UNIT(S): 100 INJECTION, SOLUTION INTRAVENOUS; SUBCUTANEOUS at 12:54

## 2025-02-06 RX ADMIN — DOBUTAMINE 5.41 MICROGRAM(S)/KG/MIN: 250 INJECTION INTRAVENOUS at 08:58

## 2025-02-06 RX ADMIN — OXYCODONE HYDROCHLORIDE 5 MILLIGRAM(S): 30 TABLET ORAL at 22:22

## 2025-02-06 RX ADMIN — Medication 650 MILLIGRAM(S): at 05:42

## 2025-02-06 RX ADMIN — BUMETANIDE 5 MG/HR: 1 TABLET ORAL at 08:58

## 2025-02-06 RX ADMIN — HEPARIN SODIUM 5.5 UNIT(S)/HR: 1000 INJECTION INTRAVENOUS; SUBCUTANEOUS at 19:25

## 2025-02-06 RX ADMIN — Medication 650 MILLIGRAM(S): at 22:22

## 2025-02-06 RX ADMIN — HEPARIN SODIUM 5.5 UNIT(S)/HR: 1000 INJECTION INTRAVENOUS; SUBCUTANEOUS at 08:57

## 2025-02-06 RX ADMIN — Medication 650 MILLIGRAM(S): at 21:39

## 2025-02-06 RX ADMIN — Medication 40 MILLIEQUIVALENT(S): at 13:18

## 2025-02-06 RX ADMIN — AMPICILLIN SODIUM AND SULBACTAM SODIUM 200 GRAM(S): 1; .5 INJECTION, POWDER, FOR SOLUTION INTRAMUSCULAR; INTRAVENOUS at 10:21

## 2025-02-06 RX ADMIN — Medication 81 MILLIGRAM(S): at 12:54

## 2025-02-06 RX ADMIN — Medication 40 MILLIGRAM(S): at 12:54

## 2025-02-06 RX ADMIN — Medication 650 MILLIGRAM(S): at 05:12

## 2025-02-06 RX ADMIN — INSULIN LISPRO 1: 100 INJECTION, SOLUTION INTRAVENOUS; SUBCUTANEOUS at 17:39

## 2025-02-06 NOTE — PROGRESS NOTE ADULT - SUBJECTIVE AND OBJECTIVE BOX
Chief Complaint: Diabetes Mellitus follow up    INTERVAL HX:  Patient seen at bedside.   Reports eating well, finishes 100% of food on each tray.   BG over the last 24 hrs have been within goal range 100-180mg/dl. No hypoglycemia.     Review of Systems:  General: As above  GI: No nausea, vomiting  Endocrine: no  S&Sx of hypoglycemia    Allergies    No Known Allergies    Intolerances    Augmentin (Stomach Upset; Vomiting; Nausea)    MEDICATIONS  (STANDING):  ampicillin/sulbactam  IVPB 3 Gram(s) IV Intermittent every 12 hours  ampicillin/sulbactam  IVPB      aspirin enteric coated 81 milliGRAM(s) Oral daily  atorvastatin 40 milliGRAM(s) Oral at bedtime  benzocaine/menthol Lozenge 1 Lozenge Oral once  bisacodyl 5 milliGRAM(s) Oral every 12 hours  buMETAnide Infusion 1 mG/Hr (5 mL/Hr) IV Continuous <Continuous>  dextrose 5%. 1000 milliLiter(s) (100 mL/Hr) IV Continuous <Continuous>  dextrose 5%. 1000 milliLiter(s) (50 mL/Hr) IV Continuous <Continuous>  dextrose 50% Injectable 25 Gram(s) IV Push once  dextrose 50% Injectable 12.5 Gram(s) IV Push once  dextrose 50% Injectable 25 Gram(s) IV Push once  DOBUTamine Infusion 2.5 MICROgram(s)/kG/Min (5.41 mL/Hr) IV Continuous <Continuous>  glucagon  Injectable 1 milliGRAM(s) IntraMuscular once  heparin  Infusion 650 Unit(s)/Hr (5.5 mL/Hr) IV Continuous <Continuous>  insulin glargine Injectable (LANTUS) 18 Unit(s) SubCutaneous at bedtime  insulin lispro (ADMELOG) corrective regimen sliding scale   SubCutaneous three times a day before meals  insulin lispro (ADMELOG) corrective regimen sliding scale   SubCutaneous at bedtime  insulin lispro Injectable (ADMELOG) 2 Unit(s) SubCutaneous three times a day with meals  metoprolol succinate ER 25 milliGRAM(s) Oral daily  pantoprazole  Injectable 40 milliGRAM(s) IV Push daily  polyethylene glycol 3350 17 Gram(s) Oral daily  senna 2 Tablet(s) Oral at bedtime      atorvastatin   40 milliGRAM(s) Oral (02-05-25 @ 21:48)    insulin glargine Injectable (LANTUS)   18 Unit(s) SubCutaneous (02-05-25 @ 21:48)    insulin lispro (ADMELOG) corrective regimen sliding scale   1 Unit(s) SubCutaneous (02-06-25 @ 12:54)   1 Unit(s) SubCutaneous (02-06-25 @ 08:59)   1 Unit(s) SubCutaneous (02-05-25 @ 17:26)    insulin lispro Injectable (ADMELOG)   2 Unit(s) SubCutaneous (02-06-25 @ 12:54)   2 Unit(s) SubCutaneous (02-06-25 @ 09:00)   2 Unit(s) SubCutaneous (02-05-25 @ 17:26)        PHYSICAL EXAM:  VITALS: T(C): 36.5 (02-06-25 @ 13:50)  T(F): 97.7 (02-06-25 @ 13:50), Max: 97.9 (02-05-25 @ 17:00)  HR: 82 (02-06-25 @ 13:50) (77 - 96)  BP: 115/72 (02-06-25 @ 13:50) (115/72 - 126/76)  RR:  (18 - 19)  SpO2:  (92% - 95%)  Wt(kg): --  GENERAL: NAD  Respiratory: Respirations unlabored   Extremities: Warm, bilateral LE wrapped in ace bandages.  NEURO: Alert , appropriate     LABS:  POCT Blood Glucose.: 181 mg/dL (02-06-25 @ 12:00)  POCT Blood Glucose.: 177 mg/dL (02-06-25 @ 08:52)  POCT Blood Glucose.: 192 mg/dL (02-05-25 @ 21:16)  POCT Blood Glucose.: 172 mg/dL (02-05-25 @ 17:16)  POCT Blood Glucose.: 196 mg/dL (02-05-25 @ 11:59)  POCT Blood Glucose.: 155 mg/dL (02-05-25 @ 08:45)  POCT Blood Glucose.: 198 mg/dL (02-04-25 @ 21:56)  POCT Blood Glucose.: 219 mg/dL (02-04-25 @ 17:29)  POCT Blood Glucose.: 280 mg/dL (02-04-25 @ 12:11)  POCT Blood Glucose.: 128 mg/dL (02-04-25 @ 08:51)  POCT Blood Glucose.: 183 mg/dL (02-03-25 @ 22:36)  POCT Blood Glucose.: 116 mg/dL (02-03-25 @ 18:03)                          9.6    9.39  )-----------( 318      ( 06 Feb 2025 07:34 )             28.1     02-06    137  |  96  |  52[H]  ----------------------------<  179[H]  3.8   |  25  |  2.56[H]    Ca    9.3      06 Feb 2025 07:32  Phos  3.5     02-06  Mg     1.9     02-06      eGFR: 20 mL/min/1.73m2 (06 Feb 2025 07:32)    01-24 Chol 122 Direct LDL -- LDL calculated 66 HDL 39[L] Trig 89  Thyroid Function Tests:  01-24 @ 05:40 TSH 5.34 FreeT4 -- T3 -- Anti TPO -- Anti Thyroglobulin Ab -- TSI --      A1C with Estimated Average Glucose Result: 11.6 % (01-24-25 @ 05:40)  A1C with Estimated Average Glucose Result: >15.5 % (12-13-24 @ 06:49)    Estimated Average Glucose: 286 mg/dL (01-24-25 @ 05:40)  Estimated Average Glucose: >398 mg/dL (12-13-24 @ 06:49)        Diet, Regular:   Consistent Carbohydrate No Snacks (CSTCHO)  Low Sodium  No Concentrated Phosphorus (02-05-25 @ 09:56) [Active]

## 2025-02-06 NOTE — PROGRESS NOTE ADULT - ASSESSMENT
TOMASZ ALBA is a 64y old woman with history of Type 2 DM, HTN, HLD, CAD, PAD, transferred here for CO2 angiogram, endocrine consulted for Type 2 DM management in the setting of poorly controlled Type 2 DM.    Patient reports eating well.  FBG stable, no hypoglycemia. Patient continues on Dobutamine and Bumex gtts. Endocrine will closely monitor BG and adjust insulin as needed for BG goal 100-180mg/dL inpatient.     1. Type 2 DM  A1C:A1C with Estimated Average Glucose Result: 11.6 % (01-24-25 @ 05:40)  A1C with Estimated Average Glucose Result: >15.5 % (12-13-24 @ 06:49)  Home regimen: Tresiba 40 units, CeQur insulin patch about 2-10 units TID with meals   Endocrinologist: Dr Grace   EGFR: eGFR: 15 mL/min/1.73m2 (02-03-25 @ 07:11)

## 2025-02-06 NOTE — PROGRESS NOTE ADULT - ASSESSMENT
63 YO F with PMHx of HFrEF 30-35 and grade 2 ddfxn (last TTE on 01/16/25), DM2, and diabetic foot ulcer. Recent admission at [facility] for ADHF. Patient now represents to OSH and ultimately to [facility] as a transfer for worsening right leg pain and fluid secretion. As per documentation, patient was reported to have severe depletion of RLE blood flow beyond the popliteal vessel at knee and similar findings in the left. Patient was transferred to [facility] for CO2 angiogram with Dr. Baltazar. Of note, patient also with reported visual disturbance for which patient was seen and evaluated by opthalmology. Internal Medicine has been consulted on Ms. Kirby's care for medical management.     # ADHF  - Recent admission at [facility] for ADHF  - TTE 1/16 with EF 30-35 with moderately reduced LVSF and grade 2 ddfxn, normal RVSF , trace TR/ NC, and trace pericardial effusion  - NST abnormal with small-sized, moderate defect in apical wall that is predominantly fixed suggestive of an infarction with minimal marcus-infarct ischemia. Post stress LVEF 25.  - CT Chest with small BL pleural effusions and small pericardial effusion.  - GDMT as per Cardio with Toprol XL 25 PO Qd   - In view of JAMEL further GDMT medications currently held   - Continue on dobutamine GTT 2.5mcg per cards, however if CRE does not improve then plan to increase to 5mcg.  - Continue on bumex 1mg GTT per renal team, monitor I and O   - Monitor volume status   - Check daily weights    - Monitor on telemetry   - Cardio eval appreciated    # BL LE Edema likely in setting of ADHF  - Diuresis as per Cardio and Renal   - BL LE elevation and compression  - Monitor for now    # Pericardial effusion likely in setting ADHF  - TTE with trace pericardial effusion   - CT Chest with small pericardial effusion   - Denies chest pain, palpitations, chest tightness or discomfort, shortness of breath or dyspnea   - Repeat TTE in 1 month for further evaluation   - Diuresis per cardio/ renal.  - Monitor on telemetry  - Cardio following    # Pleural effusion likely in setting ADHF  - CT Chest with small BL pleural effusions  - On RA with no respiratory complaints at present  - Monitor O2 saturation  - Supplement to maintain > 90%   - Diuresis per cardio/ renal.     # JAMEL with Hyponatremia and Metabolic Acidosis   - As per OSH documentation, JAMEL was thought to be second to Unasyn/ Bumex / Entresto use and Hypotension   - US RENAL with no hydronephrosis  - CRE improving slightly with diuresis/ dobutamine and likely cardiorenal induced with low flow state in ADHF  - Avoid nephrotoxic agents  - Monitor Cr and daily BMP  - Avoid overcorrection of Na > 6-8 mEq in 24 hours   - Albumin per renal   - Renal eval appreciated    # Leukocytosis likely in setting of BL LE ulcers with pop occlusion   - BCx negative   - UCx with probable contamination   - On unasyn for ABX. Frequency increased to Q12 as per Renal   - Trend CBC, temp curve, VS and adjust as tolerated    # Foot ulcers with worsening RLE pain second to pop artery occlusion +/- diabetic ulcers   - RLE Arterial Duplex with popliteal artery occlusion   - LLE Arterial Duplex with underlying disease cannot be excluded   - Patient transferred to [facility] for CO2 angiogram, however given ADHF currently not medically optimized and high risk. Plan to continue on diuresis and dobutamine as above   - Continue on Heparin GTT for now  - On Unasyn for ABX   - Continue on Lipitor  - Monitor PLT and HH while on AC   - Vascular following  - Wound care called for dressing recs     # Tachycardia likely pain related   - On Toprol and improved  - Continue to monitor on tele   - Cardio eval appreciated    # Visual Disturbance  - CT Head w/ old infarcts  - On ASA and Statin   - Opthalmology eval appreciated    # Indigestion and Constipation   - PPI, miralax and senna continued  - MOnitor BMs    # Anemia likely mixed AOCD vs iron deficiency   - HH stable in 9s on heparin GGT   - Consider iron tabs when optimized   - Monitor HH   - Transfuse for Hgb < 8  - Maintain active T/S    # Diabetes Mellitus A1C 11.6   - Continue on lantus 18 with lispro 2 and ISS   - Endocrine following    # HLD and HLD  - Continue on lipitor and toprol  - Monitor BP    # DISPO TBD    Case discussed with Dr Lu and renal

## 2025-02-06 NOTE — PROGRESS NOTE ADULT - ASSESSMENT
64F, hx of CHF (last TTE on 1/16/25 EF 30-35%, G2DD), DM, diabetic foot ulcer with a recent admission at [facility] for CHF exacerbation 1/14-1/17 p/w worsening R. leg pain and fluid secretion, was meeting sepsis criteria with podiatry having low suspicion for right cellulitis and admitted for continued management of HF exacerbation and needing ischemic eval.     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  -Left leg Arterial Duplex: Slightly tardus parvus waveform of the left popliteal artery is noted, for which underlying disease cannot be excluded. Posterior tibial artery waveform is nonpulsatile. Anterior tibial artery tardus parvus flow is noted.   Transferred to [facility] for CO2 angiogram as per vascular surgery    #  PAD Wound of lower extremity.   ·  Plan: Podiatry following, b/l serous bullae, no concerns for infection  Found to have RLE coolness  -Right Leg Arterial Duplex: Popliteal artery is occluded with negligible flow of right trifurcation arteries.  -Left leg Arterial Duplex: Slightly tardus parvus waveform of the left popliteal artery is noted, for which underlying disease cannot be excluded. Posterior tibial artery waveform is nonpulsatile. Anterior tibial artery tardus parvus flow is noted.  -Started on heparin gtt and dobutamine gtt -Will transfer to [facility] for CO2 angiogram as per vascular surgery as not candidate for CTA due to worsening SCr  -Keep compression dressing-LE elevation above heart level at rest.  -Transferred to [facility] for CO2 angiogram   - Overall this patient is at   intermediate  risk (for cardiac death, nonfatal myocardial infarction, and nonfatal cardiac arrest perioperatively for this intermediate  risk procedure).   No cardiac contraindications for CO2 vangiogram  There  are  no further recommendation for risk stratifying imaging/stress testing prior to planned surgery      # HFrEF (congestive heart failure).   ·  Plan: Hx of HF, previous admission in January, on home Lasix 40 qD, Metoprolol Succ 25 qD. Not on Entresto due to insurance issues  Last TTE: LVSF moderately decreased w/ EF 30-35 %. Moderate G2DD. Mild MR  Stress test: small-sized, moderate defect(s) in the apical wall that is predominantly fixed suggestive of an infarction with minimal marcus-infarct ischemia  Ischemic cardiomyopathy  GDMT on hold 2/2 JAMEL  Continue Dobutamine at present increased to 5 mcg/Kg/Min      # JAMEL  Creatinine Trend: 3.90<--4.40<--4.27<--3.60<--, 2.36<--, 1.55<--, 1.25<--, 1.25<--, 1.17<--  Cardiorenal   Renal consult noted  Creatinine downtrending hopefully will continue trend

## 2025-02-06 NOTE — PROGRESS NOTE ADULT - ASSESSMENT
64y Female with history of CHF presents as a transfer for LE CO2 angiogram. Nephrology consulted for elevated Scr.    1) JAMEL: likely due to ATN in setting of infection/hypotension. Scr improving with good UO. UA relatively bland. FeUrea low consistent with low flow state. Renal US unremarkable. Bladder scan on 2/3 negative.  gtt as per cardiology. Avoid nephrotoxins.    2) HTN: BP low normal. Metoprolol as per cardiology. Holding ARNI.    3) LE edema: Not secondary to nephrotic syndrome given subnephrotic range proteinuria. Continue with bumex gtt @ 1 mg/hour with  gtt as above. TTE with moderately decreased LVSF. Monitor UO.    4) Hyperphosphatemia: Resolved. Continue with low phosphorus diet.     Can increase Unasyn to Q12 dosing given improving renal function.      [facility] NEPHROLOGY  Raji Waterman M.D.  Mars Feliz D.O.  Kelley Parks M.D.  MD Nancy Kulkarni, MSN, ANP-C    Telephone: [phone]  Facsimile: [phone] [place] [address], [address]  [place] [address]

## 2025-02-06 NOTE — PROGRESS NOTE ADULT - PROBLEM SELECTOR PLAN 1
Inpatient Plan:  - Check BG TID AC and HS while on PO diet  - C/w Lantus 18u QHS  - Continue Admelog 2u TID AC (HOLD if NPO or eating <50% of meal)  - C/q low dose Admelog correctional scales TID AC and HS  Discharge Planning:   - Likely basal/bolus regimen given kidney function (doses TBD closer to d/c). Not a candidate for SGLT2 due to leg ulcers and also significantly decreased EGFR., can consider GLP1 in addition to Basal/bolus.   Can send Rx for ozempic 0.25mg weekly x 4weeks and increase to 0.50mg, or mounjaro 2.5mg 2.5mg x4 weeks, then increase to 5.0mg weekly. (Patient denies medullary thyroid cancer, hx of pancreatitis or MEN2)  - Please make sure patient has DM management supplies (glucometer, lancets, strips, alcohol pads. pen needles)  -Patient should check BGs before meals and at bedtime.  -Contact PCP/endocrinology if BG is less than 70 X1, greater than  400 X1 or persistently greater than 200s   - If BG less than 70, patient should take 4oz juice or gisel drink and recheck BG every 15 minutes until Bg comes up to 100.  -Patient should always have a source of glucose available in case of hypoglycemia  -Order glucagon (Gvoke 1mg prefilled syringe) to be given in case of severe hypoglycemia and patient is unable to ingest glucose source.  - Patient should check BG TID AC and HS at home. Can use CGM to monitor. Please tell patient to contact Endocrinologist if BG <70mg/dL x1 or >200mg/dL consistently or >400mg/dL x1.  - Endocrine follow up: can f/u with Dr. Grace, Endocrinology.   - Recommend routine outpatient ophthalmology and podiatry follow up.

## 2025-02-06 NOTE — PROGRESS NOTE ADULT - ASSESSMENT
64y.o. Female with PAD, CLTI affecting b/l LE, CHF, chronic b/l LE wound R worsen then the L, with R foot blistering and ulceration was transfer to [facility] for CO2 angiogram. Currently patient Cr is elevated, not medically optimized for the procedure. Patient is currently followed by medicine and cardiology.     Recommendations:  - Continue ASA/statin  - Cardiology optimization and risk stratification documented  - Medical/nephrology optimization/risk stratification pending  - If Cre continues to downtrend, plan for angiogram nixon  - OFE @ 2346 pm  - Preop labs  - Consent pending   - Vascular following    Vascular Surgery  [id number]   64y.o. Female with PAD, CLTI affecting b/l LE, CHF, chronic b/l LE wound R worsen then the L, with R foot blistering and ulceration was transfer to [facility] for CO2 angiogram. Currently patient Cr is elevated, not medically optimized for the procedure. Patient is currently followed by medicine and cardiology.     Recommendations:  - Continue ASA/statin  - Cardiology optimization and risk stratification documented  - Medical/nephrology optimization/risk stratification pending  - If Cre continues to downtrend, plan for angiogram next week, cancelled for tmr   - Consent pending   - Vascular following    Vascular Surgery  [id number]

## 2025-02-06 NOTE — PROGRESS NOTE ADULT - SUBJECTIVE AND OBJECTIVE BOX
SURGERY DAILY PROGRESS NOTE    24 Hour/Overnight Events: No acute events overnight    SUBJECTIVE: Patient seen and evaluated on AM rounds.     ------------------------------------------------------------------------------------------------------------  OBJECTIVE:  Vital Signs Last 24 Hrs  T(C): 36.6 (06 Feb 2025 08:55), Max: 37.1 (05 Feb 2025 13:47)  T(F): 97.8 (06 Feb 2025 08:55), Max: 98.8 (05 Feb 2025 13:47)  HR: 88 (06 Feb 2025 08:55) (77 - 96)  BP: 116/66 (06 Feb 2025 08:55) (106/65 - 126/76)  BP(mean): --  RR: 19 (06 Feb 2025 08:55) (18 - 19)  SpO2: 92% (06 Feb 2025 08:55) (92% - 95%)    Parameters below as of 06 Feb 2025 08:55  Patient On (Oxygen Delivery Method): room air      I&O's Detail    05 Feb 2025 07:01  -  06 Feb 2025 07:00  --------------------------------------------------------  IN:    Bumetanide: 100 mL    DOBUTamine: 129.6 mL    Heparin: 134 mL    Oral Fluid: 620 mL  Total IN: 983.6 mL    OUT:    Voided (mL): 2950 mL  Total OUT: 2950 mL    Total NET: -1966.4 mL      06 Feb 2025 07:01  -  06 Feb 2025 10:13  --------------------------------------------------------  IN:    Oral Fluid: 100 mL  Total IN: 100 mL    OUT:  Total OUT: 0 mL    Total NET: 100 mL          LABS:                        9.6    9.39  )-----------( 318      ( 06 Feb 2025 07:34 )             28.1     02-06    137  |  96  |  52[H]  ----------------------------<  179[H]  3.8   |  25  |  2.56[H]    Ca    9.3      06 Feb 2025 07:32  Phos  3.5     02-06  Mg     1.9     02-06        PTT - ( 06 Feb 2025 07:34 )  PTT:74.1 sec  Urinalysis Basic - ( 06 Feb 2025 07:32 )    Color: x / Appearance: x / SG: x / pH: x  Gluc: 179 mg/dL / Ketone: x  / Bili: x / Urobili: x   Blood: x / Protein: x / Nitrite: x   Leuk Esterase: x / RBC: x / WBC x   Sq Epi: x / Non Sq Epi: x / Bacteria: x  OBJECTIVE:   Physical Exam:  General: NAD, resting in bed comfortably  Cardiac: Regular rate, normotensive  Respiratory: Nonlabored respirations, normal cw expansion, on RA  Neuro: AOx3, CNII-XII intact on gross examination  Vascular/Extremities: Warm, well perfused        RLE: Dressings in place, blistering and ulceration on the dorsal aspect of the foot        LLE: First digit dry gangrene on the plantar aspect

## 2025-02-06 NOTE — PROGRESS NOTE ADULT - SUBJECTIVE AND OBJECTIVE BOX
MR#[id number]  PATIENT NAME:COLE ALBA    DATE OF SERVICE: 02-06-25 @ 0940  Patient was seen and examined by Yordy Gilmore MD on    02-06-25 @ 0940  Interim events noted.Consultant notes ,Labs,Telemetry reviewed by me       HOSPITAL COURSE: HPI:  64F, hx of CHF (last TTE on 1/16/25 EF 30-35%, G2DD), DM, diabetic foot ulcer with a recent admission at [facility] for CHF exacerbation presented as a transfer for worsening R. leg pain and fluid secretion, On non-invasive imaging demonstrating severe depletion of RLE blood flow beyond the popliteal vessel at knee and similar findings in L. Was transferred to [facility] for CO2 angiogram with Dr. Baltazar. (29 Jan 2025 20:05)      INTERIM EVENTS:Patient seen at bedside ,interim events noted.      PMH -reviewed admission note, no change since admission  HEART FAILURE: Acute[ ]Chronic[ ] Systolic[ ] Diastolic[ ] Combined Systolic and Diastolic[ ]  CAD[ ] CABG[ ] PCI[ ]  DEVICES[ ] PPM[ ] ICD[ ] ILR[ ]  ATRIAL FIBRILLATION[ ] Paroxysmal[ ] Permanent[ ] CHADS2-[  ]  JAMEL[ ] CKD1[ ] CKD2[ ] CKD3[ ] CKD4[ ] ESRD[ ]  COPD[ ] HTN[ ]   DM[ ] Type1[ ] Type 2[ ]   CVA[ ] Paresis[ ]    AMBULATION: Assisted[ ] Cane/walker[ ] Independent[ ]    MEDICATIONS  (STANDING):  ampicillin/sulbactam  IVPB 3 Gram(s) IV Intermittent every 12 hours  ampicillin/sulbactam  IVPB      aspirin enteric coated 81 milliGRAM(s) Oral daily  atorvastatin 40 milliGRAM(s) Oral at bedtime  benzocaine/menthol Lozenge 1 Lozenge Oral once  bisacodyl 5 milliGRAM(s) Oral every 12 hours  buMETAnide Infusion 1 mG/Hr (5 mL/Hr) IV Continuous <Continuous>  dextrose 5%. 1000 milliLiter(s) (100 mL/Hr) IV Continuous <Continuous>  dextrose 5%. 1000 milliLiter(s) (50 mL/Hr) IV Continuous <Continuous>  dextrose 50% Injectable 25 Gram(s) IV Push once  dextrose 50% Injectable 12.5 Gram(s) IV Push once  dextrose 50% Injectable 25 Gram(s) IV Push once  DOBUTamine Infusion 2.5 MICROgram(s)/kG/Min (5.41 mL/Hr) IV Continuous <Continuous>  glucagon  Injectable 1 milliGRAM(s) IntraMuscular once  heparin  Infusion 650 Unit(s)/Hr (5.5 mL/Hr) IV Continuous <Continuous>  insulin glargine Injectable (LANTUS) 18 Unit(s) SubCutaneous at bedtime  insulin lispro (ADMELOG) corrective regimen sliding scale   SubCutaneous three times a day before meals  insulin lispro (ADMELOG) corrective regimen sliding scale   SubCutaneous at bedtime  insulin lispro Injectable (ADMELOG) 2 Unit(s) SubCutaneous three times a day with meals  metoprolol succinate ER 25 milliGRAM(s) Oral daily  pantoprazole  Injectable 40 milliGRAM(s) IV Push daily  polyethylene glycol 3350 17 Gram(s) Oral daily  senna 2 Tablet(s) Oral at bedtime    MEDICATIONS  (PRN):  acetaminophen     Tablet .. 650 milliGRAM(s) Oral every 6 hours PRN Mild Pain (1 - 3)  dextrose Oral Gel 15 Gram(s) Oral once PRN Blood Glucose LESS THAN 70 milliGRAM(s)/deciliter  melatonin 3 milliGRAM(s) Oral at bedtime PRN Insomnia  ondansetron Injectable 4 milliGRAM(s) IV Push every 6 hours PRN Nausea and/or Vomiting  oxyCODONE    IR 5 milliGRAM(s) Oral every 6 hours PRN Severe Pain (7 - 10)            REVIEW OF SYSTEMS:  Constitutional: [ ] fever, [ ]weight loss,  [ ]fatigue [ ]weight gain  Eyes: [ ] visual changes  Respiratory: [ ]shortness of breath;  [ ] cough, [ ]wheezing, [ ]chills, [ ]hemoptysis  Cardiovascular: [ ] chest pain, [ ]palpitations, [ ]dizziness,  [ ]leg swelling[ ]orthopnea[ ]PND  Gastrointestinal: [ ] abdominal pain, [ ]nausea, [ ]vomiting,  [ ]diarrhea [ ]Constipation [ ]Melena  Genitourinary: [ ] dysuria, [ ] hematuria [ ][place]  Neurologic: [ ] headaches [ ] tremors[ ]weakness [ ]Paralysis Right[ ] Left[ ]  Skin: [ ] itching, [ ]burning, [ ] rashes  Endocrine: [ ] heat or cold intolerance  Musculoskeletal: [ ] joint pain or swelling; [ ] muscle, back, or extremity pain  Psychiatric: [ ] depression, [ ]anxiety, [ ]mood swings, or [ ]difficulty sleeping  Hematologic: [ ] easy bruising, [ ] bleeding gums    [ ] All remaining systems negative except as per above.   [ ]Unable to obtain.  [x] No change in ROS since admission      Vital Signs Last 24 Hrs  T(C): 36.8 (06 Feb 2025 21:17), Max: 36.8 (06 Feb 2025 21:17)  T(F): 98.2 (06 Feb 2025 21:17), Max: 98.2 (06 Feb 2025 21:17)  HR: 93 (06 Feb 2025 21:17) (77 - 96)  BP: 125/73 (06 Feb 2025 21:17) (115/71 - 126/76)  BP(mean): --  RR: 18 (06 Feb 2025 21:17) (18 - 19)  SpO2: 92% (06 Feb 2025 21:17) (92% - 95%)    Parameters below as of 06 Feb 2025 21:17  Patient On (Oxygen Delivery Method): room air      I&O's Summary    05 Feb 2025 07:01  -  06 Feb 2025 07:00  --------------------------------------------------------  IN: 983.6 mL / OUT: 2950 mL / NET: -1966.4 mL    06 Feb 2025 07:01  -  06 Feb 2025 22:27  --------------------------------------------------------  IN: 515.8 mL / OUT: 1300 mL / NET: -784.2 mL        PHYSICAL EXAM:  General: No acute distress BMI-  HEENT: EOMI, PERRL  Neck: Supple, [ ] JVD  Lungs: Equal air entry bilaterally; [ ] rales [ ] wheezing [ ] rhonchi  Heart: Regular rate and rhythm; [x ] murmur   2/6 [ x] systolic [ ] diastolic [ ] radiation[ ] rubs [ ]  gallops  Abdomen: Nontender, bowel sounds present  Extremities: No clubbing, cyanosis, [ ] edema [ ]Pulses  equal and intact  Nervous system:  Alert & Oriented X3, no focal deficits  Psychiatric: Normal affect  Skin: No rashes or lesions    LABS:  02-06    137  |  96  |  52[H]  ----------------------------<  179[H]  3.8   |  25  |  2.56[H]    Ca    9.3      06 Feb 2025 07:32  Phos  3.5     02-06  Mg     1.9     02-06      Creatinine Trend: 2.56<--, 2.82<--, 2.98<--, 3.31<--, 2.65<--, 3.90<--                        9.6    9.39  )-----------( 318      ( 06 Feb 2025 07:34 )             28.1     PTT - ( 06 Feb 2025 07:34 )  PTT:74.1 sec

## 2025-02-06 NOTE — PROGRESS NOTE ADULT - SUBJECTIVE AND OBJECTIVE BOX
[facility] NEPHROLOGY- PROGRESS NOTE    64y Female with history of CHF presents as a transfer for LE CO2 angiogram. Nephrology consulted for elevated Scr.    REVIEW OF SYSTEMS:  Gen: no fevers  Cards: no chest pain  Resp: + dyspnea with exertion, + cough  GI: no nausea and vomiting this morning, no diarrhea  Vascular: + LE edema    No Known Allergies      Hospital Medications: Medications reviewed      VITALS:  T(F): 97.8 (02-06-25 @ 08:55), Max: 98.8 (02-05-25 @ 13:47)  HR: 88 (02-06-25 @ 08:55)  BP: 116/66 (02-06-25 @ 08:55)  RR: 19 (02-06-25 @ 08:55)  SpO2: 92% (02-06-25 @ 08:55)  Wt(kg): --    02-05 @ 07:01  -  02-06 @ 07:00  --------------------------------------------------------  IN: 983.6 mL / OUT: 2950 mL / NET: -1966.4 mL    02-06 @ 07:01  -  02-06 @ 09:25  --------------------------------------------------------  IN: 100 mL / OUT: 0 mL / NET: 100 mL        PHYSICAL EXAM:    Gen: NAD, calm  Cards: RRR, +S1/S2, no M/G/R  Resp: bibasilar rales  GI: soft, NT/ND, NABS  Vascular: 2+ RLE edema > LLE edema      LABS:  02-06    137  |  96  |  52[H]  ----------------------------<  179[H]  3.8   |  25  |  2.56[H]    Ca    9.3      06 Feb 2025 07:32  Phos  3.5     02-06  Mg     1.9     02-06      Creatinine Trend: 2.56 <--, 2.82 <--, 2.98 <--, 3.31 <--, 2.65 <--, 3.90 <--, 4.40 <--                        9.6    9.39  )-----------( 318      ( 06 Feb 2025 07:34 )             28.1     Urine Studies:  Urinalysis Basic - ( 06 Feb 2025 07:32 )    Color:  / Appearance:  / SG:  / pH:   Gluc: 179 mg/dL / Ketone:   / Bili:  / Urobili:    Blood:  / Protein:  / Nitrite:    Leuk Esterase:  / RBC:  / WBC    Sq Epi:  / Non Sq Epi:  / Bacteria:       Creatinine, Random Urine: 33 mg/dL (01-30 @ 20:41)  Protein/Creatinine Ratio Calculation: 1.1 Ratio (01-30 @ 20:41)

## 2025-02-06 NOTE — PROGRESS NOTE ADULT - SUBJECTIVE AND OBJECTIVE BOX
Name of Patient : TOMASZ ALBA  MRN: [id number]  Date of visit: 02-06-25 @ 15:07      Subjective: Patient seen and examined. No new events except as noted.   Patient seen earlier this AM. Sitting OOB TC. Feeling well per patient  Continues on Bumex gtt. Unasyn increased as per renal     REVIEW OF SYSTEMS:    CONSTITUTIONAL: No weakness, fevers or chills  EYES/ENT: No visual changes;  No vertigo or throat pain   NECK: No pain or stiffness  RESPIRATORY: No cough, wheezing, hemoptysis; No shortness of breath  CARDIOVASCULAR: No chest pain or palpitations  GASTROINTESTINAL: No abdominal or epigastric pain. No nausea, vomiting, or hematemesis; No diarrhea or constipation. No melena or hematochezia.  GENITOURINARY: No dysuria, frequency or hematuria  NEUROLOGICAL: No numbness or weakness  SKIN: No itching, burning, rashes, or lesions   All other review of systems is negative unless indicated above.    MEDICATIONS:  MEDICATIONS  (STANDING):  ampicillin/sulbactam  IVPB 3 Gram(s) IV Intermittent every 12 hours  ampicillin/sulbactam  IVPB      aspirin enteric coated 81 milliGRAM(s) Oral daily  atorvastatin 40 milliGRAM(s) Oral at bedtime  benzocaine/menthol Lozenge 1 Lozenge Oral once  bisacodyl 5 milliGRAM(s) Oral every 12 hours  buMETAnide Infusion 1 mG/Hr (5 mL/Hr) IV Continuous <Continuous>  dextrose 5%. 1000 milliLiter(s) (100 mL/Hr) IV Continuous <Continuous>  dextrose 5%. 1000 milliLiter(s) (50 mL/Hr) IV Continuous <Continuous>  dextrose 50% Injectable 25 Gram(s) IV Push once  dextrose 50% Injectable 12.5 Gram(s) IV Push once  dextrose 50% Injectable 25 Gram(s) IV Push once  DOBUTamine Infusion 2.5 MICROgram(s)/kG/Min (5.41 mL/Hr) IV Continuous <Continuous>  glucagon  Injectable 1 milliGRAM(s) IntraMuscular once  heparin  Infusion 650 Unit(s)/Hr (5.5 mL/Hr) IV Continuous <Continuous>  insulin glargine Injectable (LANTUS) 18 Unit(s) SubCutaneous at bedtime  insulin lispro (ADMELOG) corrective regimen sliding scale   SubCutaneous three times a day before meals  insulin lispro (ADMELOG) corrective regimen sliding scale   SubCutaneous at bedtime  insulin lispro Injectable (ADMELOG) 2 Unit(s) SubCutaneous three times a day with meals  metoprolol succinate ER 25 milliGRAM(s) Oral daily  pantoprazole  Injectable 40 milliGRAM(s) IV Push daily  polyethylene glycol 3350 17 Gram(s) Oral daily  senna 2 Tablet(s) Oral at bedtime      PHYSICAL EXAM:  T(C): 36.5 (02-06-25 @ 13:50), Max: 36.6 (02-05-25 @ 17:00)  HR: 82 (02-06-25 @ 13:50) (77 - 96)  BP: 115/72 (02-06-25 @ 13:50) (115/72 - 126/76)  RR: 18 (02-06-25 @ 13:50) (18 - 19)  SpO2: 95% (02-06-25 @ 13:50) (92% - 95%)  Wt(kg): --  I&O's Summary    05 Feb 2025 07:01  -  06 Feb 2025 07:00  --------------------------------------------------------  IN: 983.6 mL / OUT: 2950 mL / NET: -1966.4 mL    06 Feb 2025 07:01  -  06 Feb 2025 15:07  --------------------------------------------------------  IN: 250 mL / OUT: 450 mL / NET: -200 mL      Appearance: Awake, sitting OOB TC 	  HEENT:  Eyes are open   Lymphatic: No lymphadenopathy grossly   Cardiovascular: Normal    Respiratory: normal effort, clear  Gastrointestinal:  Soft, Non-tender  Skin: No rashes,  warm to touch  Psychiatry:  Mood & affect appropriate  Musculoskeletal: LE dressings; Edema         02-05-25 @ 07:01  -  02-06-25 @ 07:00  --------------------------------------------------------  IN: 983.6 mL / OUT: 2950 mL / NET: -1966.4 mL    02-06-25 @ 07:01  -  02-06-25 @ 15:07  --------------------------------------------------------  IN: 250 mL / OUT: 450 mL / NET: -200 mL                                9.6    9.39  )-----------( 318      ( 06 Feb 2025 07:34 )             28.1               02-06    137  |  96  |  52[H]  ----------------------------<  179[H]  3.8   |  25  |  2.56[H]    Ca    9.3      06 Feb 2025 07:32  Phos  3.5     02-06  Mg     1.9     02-06      PTT - ( 06 Feb 2025 07:34 )  PTT:74.1 sec                   Urinalysis Basic - ( 06 Feb 2025 07:32 )    Color: x / Appearance: x / SG: x / pH: x  Gluc: 179 mg/dL / Ketone: x  / Bili: x / Urobili: x   Blood: x / Protein: x / Nitrite: x   Leuk Esterase: x / RBC: x / WBC x   Sq Epi: x / Non Sq Epi: x / Bacteria: x

## 2025-02-07 ENCOUNTER — APPOINTMENT (OUTPATIENT)
Dept: MAMMOGRAPHY | Facility: CLINIC | Age: 65
End: 2025-02-07

## 2025-02-07 ENCOUNTER — APPOINTMENT (OUTPATIENT)
Dept: ULTRASOUND IMAGING | Facility: CLINIC | Age: 65
End: 2025-02-07

## 2025-02-07 LAB
ANION GAP SERPL CALC-SCNC: 15 MMOL/L — SIGNIFICANT CHANGE UP (ref 5–17)
ANION GAP SERPL CALC-SCNC: 16 MMOL/L — SIGNIFICANT CHANGE UP (ref 5–17)
APTT BLD: 56.7 SEC — HIGH (ref 24.5–35.6)
BUN SERPL-MCNC: 46 MG/DL — HIGH (ref 7–23)
BUN SERPL-MCNC: 49 MG/DL — HIGH (ref 7–23)
CALCIUM SERPL-MCNC: 9.3 MG/DL — SIGNIFICANT CHANGE UP (ref 8.4–10.5)
CALCIUM SERPL-MCNC: 9.4 MG/DL — SIGNIFICANT CHANGE UP (ref 8.4–10.5)
CHLORIDE SERPL-SCNC: 97 MMOL/L — SIGNIFICANT CHANGE UP (ref 96–108)
CHLORIDE SERPL-SCNC: 98 MMOL/L — SIGNIFICANT CHANGE UP (ref 96–108)
CO2 SERPL-SCNC: 26 MMOL/L — SIGNIFICANT CHANGE UP (ref 22–31)
CO2 SERPL-SCNC: 27 MMOL/L — SIGNIFICANT CHANGE UP (ref 22–31)
CREAT SERPL-MCNC: 2.27 MG/DL — HIGH (ref 0.5–1.3)
CREAT SERPL-MCNC: 2.4 MG/DL — HIGH (ref 0.5–1.3)
EGFR: 22 ML/MIN/1.73M2 — LOW
EGFR: 22 ML/MIN/1.73M2 — LOW
EGFR: 24 ML/MIN/1.73M2 — LOW
EGFR: 24 ML/MIN/1.73M2 — LOW
GLUCOSE BLDC GLUCOMTR-MCNC: 117 MG/DL — HIGH (ref 70–99)
GLUCOSE BLDC GLUCOMTR-MCNC: 128 MG/DL — HIGH (ref 70–99)
GLUCOSE BLDC GLUCOMTR-MCNC: 171 MG/DL — HIGH (ref 70–99)
GLUCOSE BLDC GLUCOMTR-MCNC: 76 MG/DL — SIGNIFICANT CHANGE UP (ref 70–99)
GLUCOSE BLDC GLUCOMTR-MCNC: 78 MG/DL — SIGNIFICANT CHANGE UP (ref 70–99)
GLUCOSE SERPL-MCNC: 116 MG/DL — HIGH (ref 70–99)
GLUCOSE SERPL-MCNC: 81 MG/DL — SIGNIFICANT CHANGE UP (ref 70–99)
HCT VFR BLD CALC: 26.9 % — LOW (ref 34.5–45)
HGB BLD-MCNC: 9.3 G/DL — LOW (ref 11.5–15.5)
MCHC RBC-ENTMCNC: 24.6 PG — LOW (ref 27–34)
MCHC RBC-ENTMCNC: 34.6 G/DL — SIGNIFICANT CHANGE UP (ref 32–36)
MCV RBC AUTO: 71.2 FL — LOW (ref 80–100)
NRBC # BLD: 0 /100 WBCS — SIGNIFICANT CHANGE UP (ref 0–0)
NRBC BLD-RTO: 0 /100 WBCS — SIGNIFICANT CHANGE UP (ref 0–0)
PLATELET # BLD AUTO: 300 K/UL — SIGNIFICANT CHANGE UP (ref 150–400)
POTASSIUM SERPL-MCNC: 4 MMOL/L — SIGNIFICANT CHANGE UP (ref 3.5–5.3)
POTASSIUM SERPL-MCNC: 4.1 MMOL/L — SIGNIFICANT CHANGE UP (ref 3.5–5.3)
POTASSIUM SERPL-SCNC: 4 MMOL/L — SIGNIFICANT CHANGE UP (ref 3.5–5.3)
POTASSIUM SERPL-SCNC: 4.1 MMOL/L — SIGNIFICANT CHANGE UP (ref 3.5–5.3)
RBC # BLD: 3.78 M/UL — LOW (ref 3.8–5.2)
RBC # FLD: 15.9 % — HIGH (ref 10.3–14.5)
SODIUM SERPL-SCNC: 139 MMOL/L — SIGNIFICANT CHANGE UP (ref 135–145)
SODIUM SERPL-SCNC: 140 MMOL/L — SIGNIFICANT CHANGE UP (ref 135–145)
WBC # BLD: 12.25 K/UL — HIGH (ref 3.8–10.5)
WBC # FLD AUTO: 12.25 K/UL — HIGH (ref 3.8–10.5)

## 2025-02-07 RX ORDER — BUMETANIDE 1 MG/1
1 TABLET ORAL ONCE
Refills: 0 | Status: COMPLETED | OUTPATIENT
Start: 2025-02-07 | End: 2025-02-07

## 2025-02-07 RX ORDER — HEPARIN SODIUM 1000 [USP'U]/ML
600 INJECTION INTRAVENOUS; SUBCUTANEOUS
Qty: 25000 | Refills: 0 | Status: DISCONTINUED | OUTPATIENT
Start: 2025-02-07 | End: 2025-02-08

## 2025-02-07 RX ORDER — DOBUTAMINE 250 MG/20ML
5 INJECTION INTRAVENOUS
Qty: 500 | Refills: 0 | Status: DISCONTINUED | OUTPATIENT
Start: 2025-02-07 | End: 2025-02-11

## 2025-02-07 RX ORDER — BUMETANIDE 1 MG/1
1.5 TABLET ORAL
Qty: 20 | Refills: 0 | Status: DISCONTINUED | OUTPATIENT
Start: 2025-02-07 | End: 2025-02-10

## 2025-02-07 RX ORDER — OXYCODONE HYDROCHLORIDE 30 MG/1
5 TABLET ORAL EVERY 4 HOURS
Refills: 0 | Status: DISCONTINUED | OUTPATIENT
Start: 2025-02-07 | End: 2025-02-14

## 2025-02-07 RX ADMIN — DOBUTAMINE 10.8 MICROGRAM(S)/KG/MIN: 250 INJECTION INTRAVENOUS at 20:55

## 2025-02-07 RX ADMIN — AMPICILLIN SODIUM AND SULBACTAM SODIUM 200 GRAM(S): 1; .5 INJECTION, POWDER, FOR SOLUTION INTRAMUSCULAR; INTRAVENOUS at 11:00

## 2025-02-07 RX ADMIN — Medication 40 MILLIGRAM(S): at 11:03

## 2025-02-07 RX ADMIN — OXYCODONE HYDROCHLORIDE 5 MILLIGRAM(S): 30 TABLET ORAL at 22:52

## 2025-02-07 RX ADMIN — BUMETANIDE 1 MILLIGRAM(S): 1 TABLET ORAL at 10:06

## 2025-02-07 RX ADMIN — OXYCODONE HYDROCHLORIDE 5 MILLIGRAM(S): 30 TABLET ORAL at 17:14

## 2025-02-07 RX ADMIN — Medication 650 MILLIGRAM(S): at 22:52

## 2025-02-07 RX ADMIN — BUMETANIDE 7.5 MG/HR: 1 TABLET ORAL at 12:19

## 2025-02-07 RX ADMIN — OXYCODONE HYDROCHLORIDE 5 MILLIGRAM(S): 30 TABLET ORAL at 21:52

## 2025-02-07 RX ADMIN — OXYCODONE HYDROCHLORIDE 5 MILLIGRAM(S): 30 TABLET ORAL at 13:18

## 2025-02-07 RX ADMIN — HEPARIN SODIUM 6 UNIT(S)/HR: 1000 INJECTION INTRAVENOUS; SUBCUTANEOUS at 20:01

## 2025-02-07 RX ADMIN — ATORVASTATIN CALCIUM 40 MILLIGRAM(S): 80 TABLET, FILM COATED ORAL at 21:52

## 2025-02-07 RX ADMIN — HEPARIN SODIUM 5.5 UNIT(S)/HR: 1000 INJECTION INTRAVENOUS; SUBCUTANEOUS at 19:20

## 2025-02-07 RX ADMIN — Medication 650 MILLIGRAM(S): at 17:14

## 2025-02-07 RX ADMIN — Medication 650 MILLIGRAM(S): at 05:19

## 2025-02-07 RX ADMIN — INSULIN LISPRO 2 UNIT(S): 100 INJECTION, SOLUTION INTRAVENOUS; SUBCUTANEOUS at 13:29

## 2025-02-07 RX ADMIN — AMPICILLIN SODIUM AND SULBACTAM SODIUM 200 GRAM(S): 1; .5 INJECTION, POWDER, FOR SOLUTION INTRAMUSCULAR; INTRAVENOUS at 21:52

## 2025-02-07 RX ADMIN — Medication 81 MILLIGRAM(S): at 11:03

## 2025-02-07 RX ADMIN — Medication 650 MILLIGRAM(S): at 16:14

## 2025-02-07 RX ADMIN — OXYCODONE HYDROCHLORIDE 5 MILLIGRAM(S): 30 TABLET ORAL at 16:14

## 2025-02-07 RX ADMIN — Medication 650 MILLIGRAM(S): at 05:49

## 2025-02-07 RX ADMIN — INSULIN LISPRO 1: 100 INJECTION, SOLUTION INTRAVENOUS; SUBCUTANEOUS at 13:29

## 2025-02-07 RX ADMIN — Medication 650 MILLIGRAM(S): at 21:52

## 2025-02-07 RX ADMIN — OXYCODONE HYDROCHLORIDE 5 MILLIGRAM(S): 30 TABLET ORAL at 12:18

## 2025-02-07 RX ADMIN — OXYCODONE HYDROCHLORIDE 5 MILLIGRAM(S): 30 TABLET ORAL at 05:49

## 2025-02-07 RX ADMIN — OXYCODONE HYDROCHLORIDE 5 MILLIGRAM(S): 30 TABLET ORAL at 05:19

## 2025-02-07 RX ADMIN — METOPROLOL SUCCINATE 25 MILLIGRAM(S): 50 TABLET, EXTENDED RELEASE ORAL at 05:20

## 2025-02-07 RX ADMIN — INSULIN GLARGINE-YFGN 18 UNIT(S): 100 INJECTION, SOLUTION SUBCUTANEOUS at 21:52

## 2025-02-07 RX ADMIN — INSULIN LISPRO 2 UNIT(S): 100 INJECTION, SOLUTION INTRAVENOUS; SUBCUTANEOUS at 09:26

## 2025-02-07 RX ADMIN — HEPARIN SODIUM 5.5 UNIT(S)/HR: 1000 INJECTION INTRAVENOUS; SUBCUTANEOUS at 10:09

## 2025-02-07 RX ADMIN — DOBUTAMINE 10.8 MICROGRAM(S)/KG/MIN: 250 INJECTION INTRAVENOUS at 10:05

## 2025-02-07 NOTE — PROGRESS NOTE ADULT - SUBJECTIVE AND OBJECTIVE BOX
Patient is a 64y old  Female who presents with a chief complaint of Transfer for CO2 angiogram; PAD and CLTI (07 Feb 2025 10:34)      INTERVAL HPI/OVERNIGHT EVENTS: seen and examined , afebrile   T(C): 36.8 (02-07-25 @ 21:39), Max: 36.8 (02-07-25 @ 21:39)  HR: 96 (02-07-25 @ 21:39) (85 - 98)  BP: 124/69 (02-07-25 @ 21:39) (115/70 - 124/69)  RR: 18 (02-07-25 @ 21:39) (18 - 18)  SpO2: 96% (02-07-25 @ 21:39) (92% - 96%)  Wt(kg): --  I&O's Summary    06 Feb 2025 07:01  -  07 Feb 2025 07:00  --------------------------------------------------------  IN: 1066.6 mL / OUT: 2300 mL / NET: -1233.4 mL    07 Feb 2025 07:01  -  07 Feb 2025 22:22  --------------------------------------------------------  IN: 732 mL / OUT: 1850 mL / NET: -1118 mL        PAST MEDICAL & SURGICAL HISTORY:  DM (diabetes mellitus)      Diabetic foot ulcer      Congestive heart failure (CHF)      CAD (coronary artery disease)      Cataract of both eyes, unspecified cataract type      History of cholecystectomy          SOCIAL HISTORY  Alcohol:  Tobacco:  Illicit substance use:    FAMILY HISTORY:    REVIEW OF SYSTEMS:  CONSTITUTIONAL: No fever, weight loss, or fatigue  EYES: No eye pain, visual disturbances, or discharge  ENMT:  No difficulty hearing, tinnitus, vertigo; No sinus or throat pain  NECK: No pain or stiffness  RESPIRATORY: No cough, wheezing, chills or hemoptysis; No shortness of breath  CARDIOVASCULAR: No chest pain, palpitations, dizziness, or leg swelling  GASTROINTESTINAL: No abdominal or epigastric pain. No nausea, vomiting, or hematemesis; No diarrhea or constipation. No melena or hematochezia.  GENITOURINARY: No dysuria, frequency, hematuria, or incontinence  NEUROLOGICAL: No headaches, memory loss, loss of strength, numbness, or tremors  SKIN: No itching, burning, rashes, or lesions   LYMPH NODES: No enlarged glands  ENDOCRINE: No heat or cold intolerance; No hair loss  MUSCULOSKELETAL: No joint pain or swelling; No muscle, back, or extremity pain  PSYCHIATRIC: No depression, anxiety, mood swings, or difficulty sleeping  HEME/LYMPH: No easy bruising, or bleeding gums  ALLERY AND IMMUNOLOGIC: No hives or eczema    RADIOLOGY & ADDITIONAL TESTS:    Imaging Personally Reviewed:  [ ] YES  [ ] NO    Consultant(s) Notes Reviewed:  [ ] YES  [ ] NO    PHYSICAL EXAM:  GENERAL: NAD, well-groomed, well-developed  HEAD:  Atraumatic, Normocephalic  EYES: EOMI, PERRLA, conjunctiva and sclera clear  ENMT: No tonsillar erythema, exudates, or enlargement; Moist mucous membranes, Good dentition, No lesions  NECK: Supple, No JVD, Normal thyroid  NERVOUS SYSTEM:  Alert & Oriented X3, Good concentration; Motor Strength 5/5 B/L upper and lower extremities; DTRs 2+ intact and symmetric  CHEST/LUNG: Clear to percussion bilaterally; No rales, rhonchi, wheezing, or rubs  HEART: Regular rate and rhythm; No murmurs, rubs, or gallops  ABDOMEN: Soft, Nontender, Nondistended; Bowel sounds present  EXTREMITIES:  2+ Peripheral Pulses, No clubbing, cyanosis, or edema  LYMPH: No lymphadenopathy noted  SKIN: No rashes or lesions    LABS:                        9.3    12.25 )-----------( 300      ( 07 Feb 2025 07:13 )             26.9     02-07    139  |  98  |  46[H]  ----------------------------<  81  4.0   |  26  |  2.27[H]    Ca    9.4      07 Feb 2025 19:04  Phos  3.5     02-06  Mg     1.9     02-06      PTT - ( 07 Feb 2025 07:13 )  PTT:56.7 sec  Urinalysis Basic - ( 07 Feb 2025 19:04 )    Color: x / Appearance: x / SG: x / pH: x  Gluc: 81 mg/dL / Ketone: x  / Bili: x / Urobili: x   Blood: x / Protein: x / Nitrite: x   Leuk Esterase: x / RBC: x / WBC x   Sq Epi: x / Non Sq Epi: x / Bacteria: x      CAPILLARY BLOOD GLUCOSE      POCT Blood Glucose.: 128 mg/dL (07 Feb 2025 21:11)  POCT Blood Glucose.: 78 mg/dL (07 Feb 2025 18:14)  POCT Blood Glucose.: 76 mg/dL (07 Feb 2025 17:40)  POCT Blood Glucose.: 171 mg/dL (07 Feb 2025 12:37)  POCT Blood Glucose.: 117 mg/dL (07 Feb 2025 08:42)        Urinalysis Basic - ( 07 Feb 2025 19:04 )    Color: x / Appearance: x / SG: x / pH: x  Gluc: 81 mg/dL / Ketone: x  / Bili: x / Urobili: x   Blood: x / Protein: x / Nitrite: x   Leuk Esterase: x / RBC: x / WBC x   Sq Epi: x / Non Sq Epi: x / Bacteria: x        MEDICATIONS  (STANDING):  ampicillin/sulbactam  IVPB 3 Gram(s) IV Intermittent every 12 hours  ampicillin/sulbactam  IVPB      aspirin enteric coated 81 milliGRAM(s) Oral daily  atorvastatin 40 milliGRAM(s) Oral at bedtime  benzocaine/menthol Lozenge 1 Lozenge Oral once  bisacodyl 5 milliGRAM(s) Oral every 12 hours  buMETAnide Infusion 1.5 mG/Hr (7.5 mL/Hr) IV Continuous <Continuous>  dextrose 5%. 1000 milliLiter(s) (100 mL/Hr) IV Continuous <Continuous>  dextrose 5%. 1000 milliLiter(s) (50 mL/Hr) IV Continuous <Continuous>  dextrose 50% Injectable 25 Gram(s) IV Push once  dextrose 50% Injectable 12.5 Gram(s) IV Push once  dextrose 50% Injectable 25 Gram(s) IV Push once  DOBUTamine Infusion 5 MICROgram(s)/kG/Min (10.8 mL/Hr) IV Continuous <Continuous>  glucagon  Injectable 1 milliGRAM(s) IntraMuscular once  heparin  Infusion 600 Unit(s)/Hr (6 mL/Hr) IV Continuous <Continuous>  insulin glargine Injectable (LANTUS) 18 Unit(s) SubCutaneous at bedtime  insulin lispro (ADMELOG) corrective regimen sliding scale   SubCutaneous three times a day before meals  insulin lispro (ADMELOG) corrective regimen sliding scale   SubCutaneous at bedtime  insulin lispro Injectable (ADMELOG) 2 Unit(s) SubCutaneous three times a day with meals  metoprolol succinate ER 25 milliGRAM(s) Oral daily  pantoprazole  Injectable 40 milliGRAM(s) IV Push daily  polyethylene glycol 3350 17 Gram(s) Oral daily  senna 2 Tablet(s) Oral at bedtime    MEDICATIONS  (PRN):  acetaminophen     Tablet .. 650 milliGRAM(s) Oral every 6 hours PRN Mild Pain (1 - 3)  dextrose Oral Gel 15 Gram(s) Oral once PRN Blood Glucose LESS THAN 70 milliGRAM(s)/deciliter  melatonin 3 milliGRAM(s) Oral at bedtime PRN Insomnia  ondansetron Injectable 4 milliGRAM(s) IV Push every 6 hours PRN Nausea and/or Vomiting  oxyCODONE    IR 5 milliGRAM(s) Oral every 4 hours PRN Severe Pain (7 - 10)      Care Discussed with Consultants/Other Providers [ ] YES  [ ] NO

## 2025-02-07 NOTE — PROGRESS NOTE ADULT - ASSESSMENT
64F, hx of CHF (last TTE on 1/16/25 EF 30-35%, G2DD), DM, diabetic foot ulcer with a recent admission at [facility] for CHF exacerbation 1/14-1/17 p/w worsening R. leg pain and fluid secretion, was meeting sepsis criteria with podiatry having low suspicion for right cellulitis and admitted for continued management of HF exacerbation and needing ischemic eval.     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  -Left leg Arterial Duplex: Slightly tardus parvus waveform of the left popliteal artery is noted, for which underlying disease cannot be excluded. Posterior tibial artery waveform is nonpulsatile. Anterior tibial artery tardus parvus flow is noted.   Transferred to [facility] for CO2 angiogram as per vascular surgery    #  PAD Wound of lower extremity.   ·  Plan: Podiatry following, b/l serous bullae, no concerns for infection  Found to have RLE coolness  -Right Leg Arterial Duplex: Popliteal artery is occluded with negligible flow of right trifurcation arteries.  -Left leg Arterial Duplex: Slightly tardus parvus waveform of the left popliteal artery is noted, for which underlying disease cannot be excluded. Posterior tibial artery waveform is nonpulsatile. Anterior tibial artery tardus parvus flow is noted.  -Started on heparin gtt and dobutamine gtt -Will transfer to [facility] for CO2 angiogram as per vascular surgery as not candidate for CTA due to worsening SCr  -Keep compression dressing  -LE elevation above heart level at rest.  -Transferred to [facility] for CO2 angiogram   - Overall this patient is at   intermediate  risk (for cardiac death, nonfatal myocardial infarction, and nonfatal cardiac arrest perioperatively for this intermediate  risk procedure).   No cardiac contraindications for CO2 vangiogram  There  are  no further recommendation for risk stratifying imaging/stress testing prior to planned surgery      # HFrEF (congestive heart failure).   ·  Plan: Hx of HF, previous admission in January, on home Lasix 40 qD, Metoprolol Succ 25 qD. Not on Entresto due to insurance issues  Last TTE: LVSF moderately decreased w/ EF 30-35 %. Moderate G2DD. Mild MR  Stress test: small-sized, moderate defect(s) in the apical wall that is predominantly fixed suggestive of an infarction with minimal marcus-infarct ischemia  Ischemic cardiomyopathy  GDMT on hold 2/2 JAMEL  Continue Dobutamine and Bumex gtt at present  Still fluid overloaded increase  5mcg    # JAMEL  Creatinine Trend: 2.56<--, 2.82<--, 2.98<--, 3.31<--, 2.65<--, 3.90<-- 1.17<--  Cardiorenal

## 2025-02-07 NOTE — PROGRESS NOTE ADULT - SUBJECTIVE AND OBJECTIVE BOX
[facility] NEPHROLOGY- PROGRESS NOTE    64y Female with history of CHF presents as a transfer for LE CO2 angiogram. Nephrology consulted for elevated Scr.    REVIEW OF SYSTEMS:  Gen: no fevers  Cards: no chest pain  Resp: + dyspnea with exertion, + cough  GI: no nausea and vomiting this morning, no diarrhea  Vascular: + LE edema    No Known Allergies      Hospital Medications: Medications reviewed      VITALS:  T(F): 98 (02-07-25 @ 08:30), Max: 98.2 (02-06-25 @ 21:17)  HR: 93 (02-07-25 @ 08:30)  BP: 116/73 (02-07-25 @ 08:30)  RR: 18 (02-07-25 @ 08:30)  SpO2: 93% (02-07-25 @ 08:30)  Wt(kg): --    02-06 @ 07:01  -  02-07 @ 07:00  --------------------------------------------------------  IN: 1066.6 mL / OUT: 2300 mL / NET: -1233.4 mL    02-07 @ 07:01  -  02-07 @ 10:35  --------------------------------------------------------  IN: 0 mL / OUT: 400 mL / NET: -400 mL        PHYSICAL EXAM:    Gen: NAD, calm  Cards: RRR, +S1/S2, no M/G/R  Resp: bibasilar rales  GI: soft, NT/ND, NABS  Vascular: 2+ RLE edema > LLE edema      LABS:  02-07    140  |  97  |  49[H]  ----------------------------<  116[H]  4.1   |  27  |  2.40[H]    Ca    9.3      07 Feb 2025 07:13  Phos  3.5     02-06  Mg     1.9     02-06      Creatinine Trend: 2.40 <--, 2.56 <--, 2.82 <--, 2.98 <--, 3.31 <--, 2.65 <--, 3.90 <--                        9.3    12.25 )-----------( 300      ( 07 Feb 2025 07:13 )             26.9     Urine Studies:  Urinalysis Basic - ( 07 Feb 2025 07:13 )    Color:  / Appearance:  / SG:  / pH:   Gluc: 116 mg/dL / Ketone:   / Bili:  / Urobili:    Blood:  / Protein:  / Nitrite:    Leuk Esterase:  / RBC:  / WBC    Sq Epi:  / Non Sq Epi:  / Bacteria:

## 2025-02-07 NOTE — PROGRESS NOTE ADULT - ASSESSMENT
64y Female with history of CHF presents as a transfer for LE CO2 angiogram. Nephrology consulted for elevated Scr.    1) JAMEL: likely due to ATN in setting of infection/hypotension. Scr improving. UA relatively bland. FeUrea low consistent with low flow state. Renal US unremarkable. Bladder scan on 2/3 negative.  gtt as per cardiology. Avoid nephrotoxins.    2) HTN: BP low normal. Metoprolol as per cardiology. Holding ARNI.    3) LE edema: Not secondary to nephrotic syndrome given subnephrotic range proteinuria. Given suboptimal UO, will increase bumex gtt to 1.5 mg/hour. TTE with moderately decreased LVSF. Monitor UO.    4) Hyperphosphatemia: Resolved. Continue with low phosphorus diet.         [facility] NEPHROLOGY  Raji Waterman M.D.  Mars Feliz D.O.  Kelley Parks M.D.  MD Nancy Kulkarni, MSN, ANP-C    Telephone: [phone]  Facsimile: [phone] [place] [address], [id number]  [place] [address]

## 2025-02-07 NOTE — PROGRESS NOTE ADULT - SUBJECTIVE AND OBJECTIVE BOX
MR#[id number]  PATIENT NAME:COLE ALBA    DATE OF SERVICE: 02-07-25 @ 07:10  Patient was seen and examined by Yordy Gilmore MD on    02-07-25 @ 07:10 .  Interim events noted.Consultant notes ,Labs,Telemetry reviewed by me       HOSPITAL COURSE: HPI:  64F, hx of CHF (last TTE on 1/16/25 EF 30-35%, G2DD), DM, diabetic foot ulcer with a recent admission at [facility] for CHF exacerbation presented as a transfer for worsening R. leg pain and fluid secretion, On non-invasive imaging demonstrating severe depletion of RLE blood flow beyond the popliteal vessel at knee and similar findings in L. Was transferred to [facility] for CO2 angiogram with Dr. Baltazar. (29 Jan 2025 20:05)    Transferred from [facility] on previous admission: LVSF moderately decreased w/EF 30-35%. Moderate G2DD. Mild MR. Ischemic evaluation was recommended for which patient undergone stress test which revealed a small-sized moderate defect in the apical wall that is predominantly fixed suggestive of an infarction with minimal marcus-infarct ischemia. 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 Left leg Arterial Duplex: Slightly tardus parvus waveform of the left popliteal artery is noted, for which underlying disease cannot be excluded. Posterior tibial artery waveform is nonpulsatile. Anterior tibial artery tardus parvus flow is noted. Patient started on Heparin and Dobutamine drip with transfer to [facility] for further management.        INTERIM EVENTS:Patient seen at bedside ,interim events noted.  Awake alert remains on Bumex gtt and  gtt at 2.5mcg CO2 Angiogram postponed to next week-she is still orthopneac     PMH -reviewed admission note, no change since admission  HEART FAILURE: Acute[x ]Chronic[x ] Systolic[x ] Diastolic[ ] Combined Systolic and Diastolic[ ]  CAD[ ] CABG[ ] PCI[ ]  DEVICES[ ] PPM[ ] ICD[ ] ILR[ ]  ATRIAL FIBRILLATION[ ] Paroxysmal[ ] Permanent[ ] CHADS2-[  ]  JAMEL[ ] CKD1[ ] CKD2[ ] CKD3[ ] CKD4[ ] ESRD[ ]  COPD[ ] HTN[ ]   DM[ ] Type1[ ] Type 2[ ]   CVA[ ] Paresis[ ]    AMBULATION: Assisted[ ] Cane/walker[ ] Independent[ x]    MEDICATIONS  (STANDING):  ampicillin/sulbactam  IVPB 3 Gram(s) IV Intermittent every 12 hours  ampicillin/sulbactam  IVPB      aspirin enteric coated 81 milliGRAM(s) Oral daily  atorvastatin 40 milliGRAM(s) Oral at bedtime  benzocaine/menthol Lozenge 1 Lozenge Oral once  bisacodyl 5 milliGRAM(s) Oral every 12 hours  buMETAnide Infusion 1 mG/Hr (5 mL/Hr) IV Continuous <Continuous>  dextrose 5%. 1000 milliLiter(s) (100 mL/Hr) IV Continuous <Continuous>  dextrose 5%. 1000 milliLiter(s) (50 mL/Hr) IV Continuous <Continuous>  dextrose 50% Injectable 25 Gram(s) IV Push once  dextrose 50% Injectable 12.5 Gram(s) IV Push once  dextrose 50% Injectable 25 Gram(s) IV Push once  DOBUTamine Infusion 2.5 MICROgram(s)/kG/Min (5.41 mL/Hr) IV Continuous <Continuous>  glucagon  Injectable 1 milliGRAM(s) IntraMuscular once  heparin  Infusion 650 Unit(s)/Hr (5.5 mL/Hr) IV Continuous <Continuous>  insulin glargine Injectable (LANTUS) 18 Unit(s) SubCutaneous at bedtime  insulin lispro (ADMELOG) corrective regimen sliding scale   SubCutaneous three times a day before meals  insulin lispro (ADMELOG) corrective regimen sliding scale   SubCutaneous at bedtime  insulin lispro Injectable (ADMELOG) 2 Unit(s) SubCutaneous three times a day with meals  metoprolol succinate ER 25 milliGRAM(s) Oral daily  pantoprazole  Injectable 40 milliGRAM(s) IV Push daily  polyethylene glycol 3350 17 Gram(s) Oral daily  senna 2 Tablet(s) Oral at bedtime    MEDICATIONS  (PRN):  acetaminophen     Tablet .. 650 milliGRAM(s) Oral every 6 hours PRN Mild Pain (1 - 3)  dextrose Oral Gel 15 Gram(s) Oral once PRN Blood Glucose LESS THAN 70 milliGRAM(s)/deciliter  melatonin 3 milliGRAM(s) Oral at bedtime PRN Insomnia  ondansetron Injectable 4 milliGRAM(s) IV Push every 6 hours PRN Nausea and/or Vomiting  oxyCODONE    IR 5 milliGRAM(s) Oral every 6 hours PRN Severe Pain (7 - 10)            REVIEW OF SYSTEMS:  Constitutional: [ ] fever, [ ]weight loss,  [x ]fatigue [ ]weight gain  Eyes: [ ] visual changes  Respiratory: [ ]shortness of breath;  [ ] cough, [ ]wheezing, [ ]chills, [ ]hemoptysis  Cardiovascular: [ ] chest pain, [ ]palpitations, [ ]dizziness,  [x ]leg swelling[x ]orthopnea[ ]PND  Gastrointestinal: [ ] abdominal pain, [ ]nausea, [ ]vomiting,  [ ]diarrhea [ ]Constipation [ ]Melena  Genitourinary: [ ] dysuria, [ ] hematuria [ ][place]  Neurologic: [ ] headaches [ ] tremors[ ]weakness [ ]Paralysis Right[ ] Left[ ]  Skin: [ ] itching, [ ]burning, [ ] rashes  Endocrine: [ ] heat or cold intolerance  Musculoskeletal: [ ] joint pain or swelling; [ ] muscle, back, or extremity pain  Psychiatric: [ ] depression, [ ]anxiety, [ ]mood swings, or [ ]difficulty sleeping  Hematologic: [ ] easy bruising, [ ] bleeding gums    [ ] All remaining systems negative except as per above.   [ ]Unable to obtain.  [x] No change in ROS since admission      Vital Signs Last 24 Hrs  T(C): 36.6 (07 Feb 2025 05:00), Max: 36.8 (06 Feb 2025 21:17)  T(F): 97.9 (07 Feb 2025 05:00), Max: 98.2 (06 Feb 2025 21:17)  HR: 98 (07 Feb 2025 05:00) (82 - 98)  BP: 115/70 (07 Feb 2025 05:00) (115/70 - 125/73)  RR: 18 (07 Feb 2025 05:00) (18 - 19)  SpO2: 94% (07 Feb 2025 05:00) (92% - 95%)    Parameters below as of 07 Feb 2025 05:00  Patient On (Oxygen Delivery Method): room air      I&O's Summary    06 Feb 2025 07:01  -  07 Feb 2025 07:00  --------------------------------------------------------  IN: 883 mL / OUT: 2300 mL / NET: -1417 mL        PHYSICAL EXAM:  General: No acute distress BMI-28  HEENT: EOMI, PERRL  Neck: Supple, [ ] JVD  Lungs: Equal air entry bilaterally; [ ] rales [ ] wheezing [ ] rhonchi  Heart: Regular rate and rhythm; [x ] murmur   2/6 [ x] systolic [ ] diastolic [ ] radiation[ ] rubs [ ]  gallops  Abdomen: Nontender, bowel sounds present  Extremities: No clubbing, cyanosis, [ ] edema [x ] Warm, well perfused        RLE: Dressings in place, blistering and ulceration on the dorsal aspect of the foot        LLE: First digit dry gangrene on the plantar aspect  Nervous system:  Alert & Oriented X3, no focal deficits  Psychiatric: Normal affect  Skin: No rashes or lesions    LABS:  02-06    137  |  96  |  52[H]  ----------------------------<  179[H]  3.8   |  25  |  2.56[H]    Ca    9.3      06 Feb 2025 07:32  Phos  3.5     02-06  Mg     1.9     02-06      Creatinine Trend: 2.56<--, 2.82<--, 2.98<--, 3.31<--, 2.65<--, 3.90<--                        9.6    9.39  )-----------( 318      ( 06 Feb 2025 07:34 )             28.1     PTT - ( 06 Feb 2025 07:34 )  PTT:74.1 sec         Xray Chest 1 View- PORTABLE-Routine (Xray Chest 1 View- PORTABLE-Routine .) (02.03.25 @ 11:18) >  FINDINGS:    The heart is unchanged in size.  There are bilateral perihilar opacities.  There are small bilateral pleural effusions.  There is no pneumothorax.    IMPRESSION:    Mild pulmonary edema and small bilateral pleural effusions.      TTE W or WO Ultrasound Enhancing Agent (01.16.25 @ 12:45) >  CONCLUSIONS:      1. Left ventricular systolic function is moderately decreased with an ejection fraction visually estimated at 30 to 35 %.   2. There is moderate (grade 2) left ventricular diastolic dysfunction.   3. Mild mitral regurgitation.   4. Trace tricuspid regurgitation.   5. Trace pulmonic regurgitation.   6. Trace pericardial effusion.   7. Right pleural effusion noted.   8. No prior echocardiogram is available for comparison.        Nuclear Stress Test-Pharmacologic.. (01.24.25 @ 10:31) >  Conclusions:   1. Myocardial Perfusion: Abnormal.   2. Qualitative Perfusion:      - small-sized, moderate defect(s) in the apical wall that is predominantly fixed suggestive of an infarction with minimal marcus-infarct ischemia.   3. The post stress left ventricular EF is 25 %. The stress end diastolic volume is 118 ml.   4. Results D/Wcardiologist Dr. Gilmore at 18:31

## 2025-02-07 NOTE — PROGRESS NOTE ADULT - ASSESSMENT
65 YO F with PMHx of HFrEF 30-35 and grade 2 ddfxn (last TTE on 01/16/25), DM2, and diabetic foot ulcer. Recent admission at [facility] for ADHF. Patient now represents to OSH and ultimately to [facility] as a transfer for worsening right leg pain and fluid secretion. As per documentation, patient was reported to have severe depletion of RLE blood flow beyond the popliteal vessel at knee and similar findings in the left. Patient was transferred to [facility] for CO2 angiogram with Dr. Baltazar. Of note, patient also with reported visual disturbance for which patient was seen and evaluated by opthalmology. Internal Medicine has been consulted on Ms. Kirby's care for medical management.     # ADHF  - Recent admission at [facility] for ADHF  - TTE 1/16 with EF 30-35 with moderately reduced LVSF and grade 2 ddfxn, normal RVSF , trace TR/ MS, and trace pericardial effusion  - NST abnormal with small-sized, moderate defect in apical wall that is predominantly fixed suggestive of an infarction with minimal marcus-infarct ischemia. Post stress LVEF 25.  on IV dobutamine and IV diuresis GTT     # BL LE Edema likely in setting of ADHF  - BL LE elevation and compression    # Pericardial effusion likely in setting ADHF  - TTE with trace pericardial effusion   - CT Chest with small pericardial effusion   - Denies chest pain, palpitations, chest tightness or discomfort, shortness of breath or dyspnea   - Repeat TTE in 1 month for further evaluation   - Diuresis per cardio/ renal.  - Monitor on telemetry  - Cardio following    # Pleural effusion likely in setting ADHF  - CT Chest with small BL pleural effusions  - Diuresis per cardio/ renal.     # JAMEL with Hyponatremia and Metabolic Acidosis   - CRE improving slightly with diuresis/ dobutamine and likely cardiorenal induced with low flow state in ADHF  - Renal eval appreciated    # Leukocytosis likely in setting of BL LE ulcers with pop occlusion   - On unasyn for ABX. Frequency increased to Q12 as per Renal   - Trend CBC, temp curve, VS and adjust as tolerated    # Foot ulcers with worsening RLE pain second to pop artery occlusion +/- diabetic ulcers   - RLE Arterial Duplex with popliteal artery occlusion   - LLE Arterial Duplex with underlying disease cannot be excluded   -c/w Iv abx   - Continue on Lipitor  - Vascular following    # Visual Disturbance  - CT Head w/ old infarcts  - On ASA and Statin   - Opthalmology eval appreciated    # Anemia likely mixed AOCD vs iron deficiency   - Monitor HH   - Transfuse for Hgb < 8  - Maintain active T/S    # Diabetes Mellitus A1C 11.6   - Continue on lantus 18 with lispro 2 and ISS     # HLD and HLD  - Continue on lipitor and toprol  - Monitor BP    bill hernandez MD  covering Dr. castellon

## 2025-02-08 LAB
ALBUMIN SERPL ELPH-MCNC: 4.5 G/DL — SIGNIFICANT CHANGE UP (ref 3.3–5)
ALP SERPL-CCNC: 63 U/L — SIGNIFICANT CHANGE UP (ref 40–120)
ALT FLD-CCNC: 14 U/L — SIGNIFICANT CHANGE UP (ref 10–45)
ANION GAP SERPL CALC-SCNC: 16 MMOL/L — SIGNIFICANT CHANGE UP (ref 5–17)
APTT BLD: 60.3 SEC — HIGH (ref 24.5–35.6)
AST SERPL-CCNC: 22 U/L — SIGNIFICANT CHANGE UP (ref 10–40)
BILIRUB SERPL-MCNC: 0.6 MG/DL — SIGNIFICANT CHANGE UP (ref 0.2–1.2)
BUN SERPL-MCNC: 46 MG/DL — HIGH (ref 7–23)
CALCIUM SERPL-MCNC: 9.2 MG/DL — SIGNIFICANT CHANGE UP (ref 8.4–10.5)
CHLORIDE SERPL-SCNC: 97 MMOL/L — SIGNIFICANT CHANGE UP (ref 96–108)
CO2 SERPL-SCNC: 27 MMOL/L — SIGNIFICANT CHANGE UP (ref 22–31)
CREAT SERPL-MCNC: 2.16 MG/DL — HIGH (ref 0.5–1.3)
EGFR: 25 ML/MIN/1.73M2 — LOW
EGFR: 25 ML/MIN/1.73M2 — LOW
GLUCOSE BLDC GLUCOMTR-MCNC: 112 MG/DL — HIGH (ref 70–99)
GLUCOSE BLDC GLUCOMTR-MCNC: 115 MG/DL — HIGH (ref 70–99)
GLUCOSE BLDC GLUCOMTR-MCNC: 157 MG/DL — HIGH (ref 70–99)
GLUCOSE BLDC GLUCOMTR-MCNC: 94 MG/DL — SIGNIFICANT CHANGE UP (ref 70–99)
GLUCOSE SERPL-MCNC: 91 MG/DL — SIGNIFICANT CHANGE UP (ref 70–99)
HCT VFR BLD CALC: 26.8 % — LOW (ref 34.5–45)
HGB BLD-MCNC: 9.3 G/DL — LOW (ref 11.5–15.5)
MCHC RBC-ENTMCNC: 24.7 PG — LOW (ref 27–34)
MCHC RBC-ENTMCNC: 34.7 G/DL — SIGNIFICANT CHANGE UP (ref 32–36)
MCV RBC AUTO: 71.3 FL — LOW (ref 80–100)
NRBC # BLD: 0 /100 WBCS — SIGNIFICANT CHANGE UP (ref 0–0)
NRBC BLD-RTO: 0 /100 WBCS — SIGNIFICANT CHANGE UP (ref 0–0)
PLATELET # BLD AUTO: 283 K/UL — SIGNIFICANT CHANGE UP (ref 150–400)
POTASSIUM SERPL-MCNC: 3.8 MMOL/L — SIGNIFICANT CHANGE UP (ref 3.5–5.3)
POTASSIUM SERPL-SCNC: 3.8 MMOL/L — SIGNIFICANT CHANGE UP (ref 3.5–5.3)
PROT SERPL-MCNC: 8.2 G/DL — SIGNIFICANT CHANGE UP (ref 6–8.3)
RBC # BLD: 3.76 M/UL — LOW (ref 3.8–5.2)
RBC # FLD: 15.9 % — HIGH (ref 10.3–14.5)
SODIUM SERPL-SCNC: 140 MMOL/L — SIGNIFICANT CHANGE UP (ref 135–145)
WBC # BLD: 12.14 K/UL — HIGH (ref 3.8–10.5)
WBC # FLD AUTO: 12.14 K/UL — HIGH (ref 3.8–10.5)

## 2025-02-08 RX ORDER — GABAPENTIN 400 MG/1
300 CAPSULE ORAL ONCE
Refills: 0 | Status: DISCONTINUED | OUTPATIENT
Start: 2025-02-08 | End: 2025-02-08

## 2025-02-08 RX ORDER — RIVAROXABAN 10 MG/1
2.5 TABLET, FILM COATED ORAL
Refills: 0 | Status: DISCONTINUED | OUTPATIENT
Start: 2025-02-08 | End: 2025-02-11

## 2025-02-08 RX ORDER — GABAPENTIN 400 MG/1
100 CAPSULE ORAL ONCE
Refills: 0 | Status: COMPLETED | OUTPATIENT
Start: 2025-02-08 | End: 2025-02-08

## 2025-02-08 RX ADMIN — OXYCODONE HYDROCHLORIDE 5 MILLIGRAM(S): 30 TABLET ORAL at 22:09

## 2025-02-08 RX ADMIN — BUMETANIDE 7.5 MG/HR: 1 TABLET ORAL at 16:21

## 2025-02-08 RX ADMIN — Medication 40 MILLIGRAM(S): at 13:51

## 2025-02-08 RX ADMIN — OXYCODONE HYDROCHLORIDE 5 MILLIGRAM(S): 30 TABLET ORAL at 05:47

## 2025-02-08 RX ADMIN — AMPICILLIN SODIUM AND SULBACTAM SODIUM 200 GRAM(S): 1; .5 INJECTION, POWDER, FOR SOLUTION INTRAMUSCULAR; INTRAVENOUS at 21:57

## 2025-02-08 RX ADMIN — Medication 81 MILLIGRAM(S): at 13:50

## 2025-02-08 RX ADMIN — RIVAROXABAN 2.5 MILLIGRAM(S): 10 TABLET, FILM COATED ORAL at 08:13

## 2025-02-08 RX ADMIN — INSULIN LISPRO 2 UNIT(S): 100 INJECTION, SOLUTION INTRAVENOUS; SUBCUTANEOUS at 09:04

## 2025-02-08 RX ADMIN — OXYCODONE HYDROCHLORIDE 5 MILLIGRAM(S): 30 TABLET ORAL at 23:09

## 2025-02-08 RX ADMIN — INSULIN GLARGINE-YFGN 18 UNIT(S): 100 INJECTION, SOLUTION SUBCUTANEOUS at 21:56

## 2025-02-08 RX ADMIN — Medication 650 MILLIGRAM(S): at 11:30

## 2025-02-08 RX ADMIN — Medication 650 MILLIGRAM(S): at 05:47

## 2025-02-08 RX ADMIN — OXYCODONE HYDROCHLORIDE 5 MILLIGRAM(S): 30 TABLET ORAL at 12:25

## 2025-02-08 RX ADMIN — HEPARIN SODIUM 6 UNIT(S)/HR: 1000 INJECTION INTRAVENOUS; SUBCUTANEOUS at 07:37

## 2025-02-08 RX ADMIN — OXYCODONE HYDROCHLORIDE 5 MILLIGRAM(S): 30 TABLET ORAL at 06:47

## 2025-02-08 RX ADMIN — BUMETANIDE 7.5 MG/HR: 1 TABLET ORAL at 01:56

## 2025-02-08 RX ADMIN — DOBUTAMINE 10.8 MICROGRAM(S)/KG/MIN: 250 INJECTION INTRAVENOUS at 16:22

## 2025-02-08 RX ADMIN — ATORVASTATIN CALCIUM 40 MILLIGRAM(S): 80 TABLET, FILM COATED ORAL at 21:58

## 2025-02-08 RX ADMIN — Medication 650 MILLIGRAM(S): at 06:47

## 2025-02-08 RX ADMIN — RIVAROXABAN 2.5 MILLIGRAM(S): 10 TABLET, FILM COATED ORAL at 17:46

## 2025-02-08 RX ADMIN — AMPICILLIN SODIUM AND SULBACTAM SODIUM 200 GRAM(S): 1; .5 INJECTION, POWDER, FOR SOLUTION INTRAMUSCULAR; INTRAVENOUS at 09:04

## 2025-02-08 RX ADMIN — Medication 650 MILLIGRAM(S): at 12:25

## 2025-02-08 RX ADMIN — GABAPENTIN 100 MILLIGRAM(S): 400 CAPSULE ORAL at 23:28

## 2025-02-08 RX ADMIN — OXYCODONE HYDROCHLORIDE 5 MILLIGRAM(S): 30 TABLET ORAL at 11:29

## 2025-02-08 RX ADMIN — Medication 5 MILLIGRAM(S): at 17:45

## 2025-02-08 NOTE — PROGRESS NOTE ADULT - ASSESSMENT
65 YO F with PMHx of HFrEF 30-35 and grade 2 ddfxn (last TTE on 01/16/25), DM2, and diabetic foot ulcer. Recent admission at [facility] for ADHF. Patient now represents to OSH and ultimately to [facility] as a transfer for worsening right leg pain and fluid secretion. As per documentation, patient was reported to have severe depletion of RLE blood flow beyond the popliteal vessel at knee and similar findings in the left. Patient was transferred to [facility] for CO2 angiogram with Dr. Baltazar. Of note, patient also with reported visual disturbance for which patient was seen and evaluated by opthalmology. Internal Medicine has been consulted on Ms. Kirby's care for medical management.     # Ac on ch systolic heart failure   - Recent admission at [facility] for ADHF  - TTE 1/16 with EF 30-35 with moderately reduced LVSF and grade 2 ddfxn, normal RVSF , trace TR/ NC, and trace pericardial effusion  cardio following   on IV bumex GTT and dobutamine     # BL LE Edema likely in setting of ADHF  - BL LE elevation and compression    # Pericardial effusion likely in setting ADHF  - TTE with trace pericardial effusion   - Repeat TTE in 1 month for further evaluation   - Diuresis per cardio/ renal.    # JAMEL with Hyponatremia and Metabolic Acidosis   - Renal eval appreciated    # Leukocytosis likely in setting of BL LE ulcers with pop occlusion   - On unasyn for ABX. Frequency increased to Q12 as per Renal   - Trend CBC, temp curve, VS and adjust as tolerated    # Foot ulcers with worsening RLE pain second to pop artery occlusion +/- diabetic ulcers   - RLE Arterial Duplex with popliteal artery occlusion   - LLE Arterial Duplex with underlying disease cannot be excluded   -c/w Iv abx   - Continue on Lipitor  - Vascular following    # Visual Disturbance  - CT Head w/ old infarcts  - On ASA and Statin   - Opthalmology eval appreciated    # Anemia likely mixed AOCD vs iron deficiency   - Monitor HH   - Transfuse for Hgb < 8  - Maintain active T/S    # Diabetes Mellitus A1C 11.6   - Continue on lantus 18 with lispro 2 and ISS     # HLD and HLD  - Continue on lipitor and toprol  - Monitor BP    bill hernandez MD  covering Dr. castellon

## 2025-02-08 NOTE — CONSULT NOTE ADULT - ASSESSMENT
64F presents with bilateral lower extremity wounds   - Patient seen and evaluated  - Afebrile, WBC 12.14  - Bilateral lower extremity venous stasis wounds to dermis, mild serous drainage, no acute signs of infection. Left foot hallux distal tuft well adhered eschar, no acute signs of infection.   - Bilateral foot xray: no OM, no gas   - No culture taken secondary to no acute signs of infection   - Vasc recs, appreciated, planning outpatient angio   - No acute pod intervention, local wound care only  - Pod stable for discharge   - Wound care instruction and follow up information in discharge note provider   - Discussed with attending

## 2025-02-08 NOTE — PROGRESS NOTE ADULT - ASSESSMENT
64F, hx of CHF (last TTE on 1/16/25 EF 30-35%, G2DD), DM, diabetic foot ulcer with a recent admission at [facility] for CHF exacerbation 1/14-1/17 p/w worsening R. leg pain and fluid secretion, was meeting sepsis criteria with podiatry having low suspicion for right cellulitis and admitted for continued management of HF exacerbation and needing ischemic eval.     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  -Left leg Arterial Duplex: Slightly tardus parvus waveform of the left popliteal artery is noted, for which underlying disease cannot be excluded. Posterior tibial artery waveform is nonpulsatile. Anterior tibial artery tardus parvus flow is noted.   Transferred to [facility] for CO2 angiogram as per vascular surgery    #  PAD Wound of lower extremity.   ·  Plan: Podiatry following, b/l serous bullae, no concerns for infection  Found to have RLE coolness  -Right Leg Arterial Duplex: Popliteal artery is occluded with negligible flow of right trifurcation arteries.  -Left leg Arterial Duplex: Slightly tardus parvus waveform of the left popliteal artery is noted, for which underlying disease cannot be excluded. Posterior tibial artery waveform is nonpulsatile. Anterior tibial artery tardus parvus flow is noted.  -Started on heparin gtt and dobutamine gtt -Will transfer to [facility] for CO2 angiogram as per vascular surgery as not candidate for CTA due to worsening SCr  -Keep compression dressing  -LE elevation above heart level at rest.  -Transferred to [facility] for CO2 angiogram   - Overall this patient is at   intermediate  risk (for cardiac death, nonfatal myocardial infarction, and nonfatal cardiac arrest perioperatively for this intermediate  risk procedure).   No cardiac contraindications for CO2 vangiogram  There  are  no further recommendation for risk stratifying imaging/stress testing prior to planned surgery  Plan for outpatient CO2 angiogram    # HFrEF (congestive heart failure).   ·  Plan: Hx of HF, previous admission in January, on home Lasix 40 qD, Metoprolol Succ 25 qD. Not on Entresto due to insurance issues  Last TTE: LVSF moderately decreased w/ EF 30-35 %. Moderate G2DD. Mild MR  Stress test: small-sized, moderate defect(s) in the apical wall that is predominantly fixed suggestive of an infarction with minimal marcus-infarct ischemia  Ischemic cardiomyopathy  GDMT on hold 2/2 JAMEL  Continue Dobutamine and Bumex gtt at present  Still fluid overloaded increase  5mcg  Will continue Inotrope assisted diuresis till tomorrow and transition to GDMT  Patient on Heparin gtt change to Xarelto 2.5mg BID for PAD    # JAMEL  Creatinine Trend: 2.27<--2.56<--, 2.82<--, 2.98<--, 3.31<--, 2.65<--, 3.90<-- 1.17<--  Cardiorenal

## 2025-02-08 NOTE — CONSULT NOTE ADULT - SUBJECTIVE AND OBJECTIVE BOX
Patient is a 64y old  Female who presents with a chief complaint of Transfer for CO2 angiogram; PAD and CLTI (08 Feb 2025 11:11)      HPI:  64F, hx of CHF (last TTE on 1/16/25 EF 30-35%, G2DD), DM, diabetic foot ulcer with a recent admission at [facility] for CHF exacerbation presented as a transfer for worsening R. leg pain and fluid secretion, On non-invasive imaging demonstrating severe depletion of RLE blood flow beyond the popliteal vessel at knee and similar findings in L. Was transferred to [facility] for CO2 angiogram with Dr. Baltazar. (29 Jan 2025 20:05)      PAST MEDICAL & SURGICAL HISTORY:  DM (diabetes mellitus)      Diabetic foot ulcer      Congestive heart failure (CHF)      CAD (coronary artery disease)      Cataract of both eyes, unspecified cataract type      History of cholecystectomy          MEDICATIONS  (STANDING):  ampicillin/sulbactam  IVPB 3 Gram(s) IV Intermittent every 12 hours  ampicillin/sulbactam  IVPB      aspirin enteric coated 81 milliGRAM(s) Oral daily  atorvastatin 40 milliGRAM(s) Oral at bedtime  benzocaine/menthol Lozenge 1 Lozenge Oral once  bisacodyl 5 milliGRAM(s) Oral every 12 hours  buMETAnide Infusion 1.5 mG/Hr (7.5 mL/Hr) IV Continuous <Continuous>  dextrose 5%. 1000 milliLiter(s) (100 mL/Hr) IV Continuous <Continuous>  dextrose 5%. 1000 milliLiter(s) (50 mL/Hr) IV Continuous <Continuous>  dextrose 50% Injectable 25 Gram(s) IV Push once  dextrose 50% Injectable 12.5 Gram(s) IV Push once  dextrose 50% Injectable 25 Gram(s) IV Push once  DOBUTamine Infusion 5 MICROgram(s)/kG/Min (10.8 mL/Hr) IV Continuous <Continuous>  glucagon  Injectable 1 milliGRAM(s) IntraMuscular once  insulin glargine Injectable (LANTUS) 18 Unit(s) SubCutaneous at bedtime  insulin lispro (ADMELOG) corrective regimen sliding scale   SubCutaneous three times a day before meals  insulin lispro (ADMELOG) corrective regimen sliding scale   SubCutaneous at bedtime  insulin lispro Injectable (ADMELOG) 2 Unit(s) SubCutaneous three times a day with meals  metoprolol succinate ER 25 milliGRAM(s) Oral daily  pantoprazole  Injectable 40 milliGRAM(s) IV Push daily  polyethylene glycol 3350 17 Gram(s) Oral daily  rivaroxaban 2.5 milliGRAM(s) Oral two times a day with meals  senna 2 Tablet(s) Oral at bedtime    MEDICATIONS  (PRN):  acetaminophen     Tablet .. 650 milliGRAM(s) Oral every 6 hours PRN Mild Pain (1 - 3)  dextrose Oral Gel 15 Gram(s) Oral once PRN Blood Glucose LESS THAN 70 milliGRAM(s)/deciliter  melatonin 3 milliGRAM(s) Oral at bedtime PRN Insomnia  ondansetron Injectable 4 milliGRAM(s) IV Push every 6 hours PRN Nausea and/or Vomiting  oxyCODONE    IR 5 milliGRAM(s) Oral every 4 hours PRN Severe Pain (7 - 10)      Allergies    No Known Allergies    Intolerances    Augmentin (Stomach Upset; Vomiting; Nausea)      VITALS:    Vital Signs Last 24 Hrs  T(C): 36.8 (08 Feb 2025 09:09), Max: 36.8 (07 Feb 2025 21:39)  T(F): 98.3 (08 Feb 2025 09:09), Max: 98.3 (07 Feb 2025 21:39)  HR: 100 (08 Feb 2025 09:09) (85 - 100)  BP: 105/70 (08 Feb 2025 09:09) (105/70 - 124/69)  BP(mean): --  RR: 18 (08 Feb 2025 09:09) (18 - 18)  SpO2: 94% (08 Feb 2025 09:09) (92% - 96%)    Parameters below as of 08 Feb 2025 09:09  Patient On (Oxygen Delivery Method): room air        LABS:                          9.3    12.14 )-----------( 283      ( 08 Feb 2025 06:58 )             26.8       02-08    140  |  97  |  46[H]  ----------------------------<  91  3.8   |  27  |  2.16[H]    Ca    9.2      08 Feb 2025 06:58    TPro  8.2  /  Alb  4.5  /  TBili  0.6  /  DBili  x   /  AST  22  /  ALT  14  /  AlkPhos  63  02-08      CAPILLARY BLOOD GLUCOSE      POCT Blood Glucose.: 94 mg/dL (08 Feb 2025 12:17)  POCT Blood Glucose.: 112 mg/dL (08 Feb 2025 08:30)  POCT Blood Glucose.: 128 mg/dL (07 Feb 2025 21:11)  POCT Blood Glucose.: 78 mg/dL (07 Feb 2025 18:14)  POCT Blood Glucose.: 76 mg/dL (07 Feb 2025 17:40)  POCT Blood Glucose.: 171 mg/dL (07 Feb 2025 12:37)      PTT - ( 08 Feb 2025 02:25 )  PTT:60.3 sec    LOWER EXTREMITY PHYSICAL EXAM:    Vascular: DP/PT 0/4, B/L, CFT <3 seconds B/L, Temperature gradient warm to cool, B/L.   Neuro: Epicritic sensation diminished to the level of digits, B/L.  Musculoskeletal/Ortho: unremarkable   Skin: Bilateral lower extremity venous stasis wounds to dermis, mild serous drainage, no acute signs of infection. Left foot hallux distal tuft well adhered eschar, no acute signs of infection.         RADIOLOGY & ADDITIONAL STUDIES:

## 2025-02-08 NOTE — DISCHARGE NOTE PROVIDER - NSFOLLOWUPCLINICS_GEN_ALL_ED_FT
[facility]  Urology  [address] [place] [id number]  Phone: [phone]  Fax:   Follow Up Time: 1 week    [facility]  Endocrinology  [address] [place] [id number]  Phone: [phone]  Fax:   Follow Up Time: 1 week    [facility] - Primary Care  Primary Care  [address] [place] [address]  Phone: [phone]  Fax:   Follow Up Time: 1 week

## 2025-02-08 NOTE — DISCHARGE NOTE PROVIDER - NSDCFUADDAPPT_GEN_ALL_CORE_FT
Podiatry Discharge Instructions:  Follow up: Please follow up with Dr. Rosen within 1 week of discharge from the hospital, please call [phone] for appointment and discuss that you recently were seen in the hospital.  Wound Care: Please apply adaptic and aquacel ag to bilateral lower extremity wounds followed by 4x4 gauze, ABD pad, and maru and ace bandage daily   Weight bearing: Please weight bear as tolerated in a surgical shoe.  Antibiotics: Please continue as instructed. APPTS ARE READY TO BE MADE: [X] YES    Best Family or Patient Contact (if needed):    Additional Information about above appointments (if needed):    1: Cards  2: Endo  3: Pulm  4: Nephro  5: Vasc Surg  6: EP  7: Uro  8: Podiatry  9: PCP    Other comments or requests:       Podiatry Discharge Instructions:  Follow up: Please follow up with Dr. Rosen within 1 week of discharge from the hospital, please call [phone] for appointment and discuss that you recently were seen in the hospital.  Wound Care: Please apply adaptic and aquacel ag to bilateral lower extremity wounds followed by 4x4 gauze, ABD pad, and maru and ace bandage daily   Weight bearing: Please weight bear as tolerated in a surgical shoe.  Antibiotics: Please continue as instructed. APPTS ARE READY TO BE MADE: [X] YES    Best Family or Patient Contact (if needed):    Additional Information about above appointments (if needed):    1: Cards  2: Endo  3: Pulm  4: Nephro  5: Vasc Surg  6: EP  7: Uro  8: Podiatry  9: PCP    Other comments or requests:       Podiatry Discharge Instructions:  Follow up: Please follow up with Dr. Rosen within 1 week of discharge from the hospital, please call [phone] for appointment and discuss that you recently were seen in the hospital.  Wound Care: Please apply adaptic and aquacel ag to bilateral lower extremity wounds followed by 4x4 gauze, ABD pad, and maru and ace bandage daily   Weight bearing: Please weight bear as tolerated in a surgical shoe.  Antibiotics: Please continue as instructed.      Patient is being discharged to rehab. Caregiver will arrange follow up

## 2025-02-08 NOTE — PROGRESS NOTE ADULT - SUBJECTIVE AND OBJECTIVE BOX
MR#[id number]  PATIENT NAME:COLE ALBA    DATE OF SERVICE: 02-08-25 @ 07:33  Patient was seen and examined by Yordy Gilmore MD on    02-08-25 @ 07:33 .  Interim events noted.Consultant notes ,Labs,Telemetry reviewed by me       HOSPITAL COURSE: HPI:  64F, hx of CHF (last TTE on 1/16/25 EF 30-35%, G2DD), DM, diabetic foot ulcer with a recent admission at [facility] for CHF exacerbation presented as a transfer for worsening R. leg pain and fluid secretion, On non-invasive imaging demonstrating severe depletion of RLE blood flow beyond the popliteal vessel at knee and similar findings in L. Was transferred to [facility] for CO2 angiogram with Dr. Baltazar. (29 Jan 2025 20:05)        Transferred from [facility]-TTE on previous admission: LVSF moderately decreased w/EF 30-35%. Moderate G2DD. Mild MR. Ischemic evaluation was recommended for which patient undergone stress test which revealed a small-sized moderate defect in the apical wall that is predominantly fixed suggestive of an infarction with minimal marcus-infarct ischemia. 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 Left leg Arterial Duplex: Slightly tardus parvus waveform of the left popliteal artery is noted, for which underlying disease cannot be excluded. Posterior tibial artery waveform is nonpulsatile. Anterior tibial artery tardus parvus flow is noted. Patient started on Heparin and Dobutamine drip with transfer to [facility] for further management.        INTERIM EVENTS:Patient seen at bedside ,interim events noted.  Awake alert remains on Bumex gtt and  gtt at 2.5mcg CO2 Angiogram postponed to next week-she is still orthopneac  02/08-on  5mcg and Bumex gtt       PMH -reviewed admission note, no change since admission  HEART FAILURE: Acute[x ]Chronic[x ] Systolic[x ] Diastolic[ ] Combined Systolic and Diastolic[ ]  CAD[ ] CABG[ ] PCI[ ]  DEVICES[ ] PPM[ ] ICD[ ] ILR[ ]  ATRIAL FIBRILLATION[ ] Paroxysmal[ ] Permanent[ ] CHADS2-[  ]  JAMEL[ ] CKD1[ ] CKD2[ ] CKD3[ ] CKD4[ ] ESRD[ ]  COPD[ ] HTN[ ]   DM[ ] Type1[ ] Type 2[ ]   CVA[ ] Paresis[ ]    AMBULATION: Assisted[ ] Cane/walker[ ] Independent[ x]    MEDICATIONS  (STANDING):  ampicillin/sulbactam  IVPB 3 Gram(s) IV Intermittent every 12 hours  ampicillin/sulbactam  IVPB      aspirin enteric coated 81 milliGRAM(s) Oral daily  atorvastatin 40 milliGRAM(s) Oral at bedtime  benzocaine/menthol Lozenge 1 Lozenge Oral once  bisacodyl 5 milliGRAM(s) Oral every 12 hours  buMETAnide Infusion 1.5 mG/Hr (7.5 mL/Hr) IV Continuous <Continuous>  dextrose 5%. 1000 milliLiter(s) (100 mL/Hr) IV Continuous <Continuous>  dextrose 5%. 1000 milliLiter(s) (50 mL/Hr) IV Continuous <Continuous>  dextrose 50% Injectable 25 Gram(s) IV Push once  dextrose 50% Injectable 12.5 Gram(s) IV Push once  dextrose 50% Injectable 25 Gram(s) IV Push once  DOBUTamine Infusion 5 MICROgram(s)/kG/Min (10.8 mL/Hr) IV Continuous <Continuous>  glucagon  Injectable 1 milliGRAM(s) IntraMuscular once  heparin  Infusion 600 Unit(s)/Hr (6 mL/Hr) IV Continuous <Continuous>  insulin glargine Injectable (LANTUS) 18 Unit(s) SubCutaneous at bedtime  insulin lispro (ADMELOG) corrective regimen sliding scale   SubCutaneous three times a day before meals  insulin lispro (ADMELOG) corrective regimen sliding scale   SubCutaneous at bedtime  insulin lispro Injectable (ADMELOG) 2 Unit(s) SubCutaneous three times a day with meals  metoprolol succinate ER 25 milliGRAM(s) Oral daily  pantoprazole  Injectable 40 milliGRAM(s) IV Push daily  polyethylene glycol 3350 17 Gram(s) Oral daily  senna 2 Tablet(s) Oral at bedtime    MEDICATIONS  (PRN):  acetaminophen     Tablet .. 650 milliGRAM(s) Oral every 6 hours PRN Mild Pain (1 - 3)  dextrose Oral Gel 15 Gram(s) Oral once PRN Blood Glucose LESS THAN 70 milliGRAM(s)/deciliter  melatonin 3 milliGRAM(s) Oral at bedtime PRN Insomnia  ondansetron Injectable 4 milliGRAM(s) IV Push every 6 hours PRN Nausea and/or Vomiting  oxyCODONE    IR 5 milliGRAM(s) Oral every 4 hours PRN Severe Pain (7 - 10)            REVIEW OF SYSTEMS:  Constitutional: [ ] fever, [ ]weight loss,  [x ]fatigue [ ]weight gain  Eyes: [ ] visual changes  Respiratory: [ ]shortness of breath;  [ ] cough, [ ]wheezing, [ ]chills, [ ]hemoptysis  Cardiovascular: [ ] chest pain, [ ]palpitations, [ ]dizziness,  [ ]leg swelling[ ]orthopnea[ ]PND  Gastrointestinal: [ ] abdominal pain, [ ]nausea, [ ]vomiting,  [ ]diarrhea [ ]Constipation [ ]Melena  Genitourinary: [ ] dysuria, [ ] hematuria [ ]Montgomery  Neurologic: [ ] headaches [ ] tremors[ ]weakness [ ]Paralysis Right[ ] Left[ ]  Skin: [ ] itching, [ ]burning, [ ] rashes  Endocrine: [ ] heat or cold intolerance  Musculoskeletal: [ ] joint pain or swelling; [ ] muscle, back, or extremity pain  Psychiatric: [ ] depression, [ ]anxiety, [ ]mood swings, or [ ]difficulty sleeping  Hematologic: [ ] easy bruising, [ ] bleeding gums    [ ] All remaining systems negative except as per above.   [ ]Unable to obtain.  [x] No change in ROS since admission      Vital Signs Last 24 Hrs  T(C): 36.8 (08 Feb 2025 05:12), Max: 36.8 (07 Feb 2025 21:39)  T(F): 98.2 (08 Feb 2025 05:12), Max: 98.3 (07 Feb 2025 21:39)  HR: 100 (08 Feb 2025 05:12) (85 - 100)  BP: 121/68 (08 Feb 2025 05:12) (116/73 - 124/69)  RR: 18 (08 Feb 2025 05:12) (18 - 18)  SpO2: 94% (08 Feb 2025 05:12) (92% - 96%)    Parameters below as of 08 Feb 2025 05:12  Patient On (Oxygen Delivery Method): room air      I&O's Summary    07 Feb 2025 07:01  -  08 Feb 2025 07:00  --------------------------------------------------------  IN: 1072 mL / OUT: 2850 mL / NET: -1778 mL        PHYSICAL EXAM:  General: No acute distress BMI-24  HEENT: EOMI, PERRL  Neck: Supple, [ ] JVD  Lungs: Equal air entry bilaterally; [ ] rales [ ] wheezing [ ] rhonchi  Heart: Regular rate and rhythm; [x ] murmur   2/6 [ x] systolic [ ] diastolic [ ] radiation[ ] rubs [ ]  gallops  Abdomen: Nontender, bowel sounds present  Extremities: No clubbing, cyanosis, [x ] edema [x  Warm, well perfused        RLE: Dressings in place, blistering and ulceration on the dorsal aspect of the foot        LLE: First digit dry gangrene on the plantar aspect  Nervous system:  Alert & Oriented X3, no focal deficits  Psychiatric: Normal affect  Skin: No rashes or lesions    LABS:  02-07    139  |  98  |  46[H]  ----------------------------<  81  4.0   |  26  |  2.27[H]    Ca    9.4      07 Feb 2025 19:04      Creatinine Trend: 2.27<--, 2.40<--, 2.56<--, 2.82<--, 2.98<--, 3.31<--                        9.3    12.14 )-----------( 283      ( 08 Feb 2025 06:58 )             26.8     PTT - ( 08 Feb 2025 02:25 )  PTT:60.3 sec       Xray Chest 1 View- PORTABLE-Routine (Xray Chest 1 View- PORTABLE-Routine .) (02.03.25 @ 11:18) >  FINDINGS:    The heart is unchanged in size.  There are bilateral perihilar opacities.  There are small bilateral pleural effusions.  There is no pneumothorax.    IMPRESSION:    Mild pulmonary edema and small bilateral pleural effusions.      TTE W or WO Ultrasound Enhancing Agent (01.16.25 @ 12:45) >  CONCLUSIONS:      1. Left ventricular systolic function is moderately decreased with an ejection fraction visually estimated at 30 to 35 %.   2. There is moderate (grade 2) left ventricular diastolic dysfunction.   3. Mild mitral regurgitation.   4. Trace tricuspid regurgitation.   5. Trace pulmonic regurgitation.   6. Trace pericardial effusion.   7. Right pleural effusion noted.   8. No prior echocardiogram is available for comparison.        Nuclear Stress Test-Pharmacologic.. (01.24.25 @ 10:31) >  Conclusions:   1. Myocardial Perfusion: Abnormal.   2. Qualitative Perfusion:      - small-sized, moderate defect(s) in the apical wall that is predominantly fixed suggestive of an infarction with minimal marcus-infarct ischemia.   3. The post stress left ventricular EF is 25 %. The stress end diastolic volume is 118 ml.   4. Results D/Wcardiologist Dr. Gilmore at 18:31

## 2025-02-08 NOTE — PROGRESS NOTE ADULT - SUBJECTIVE AND OBJECTIVE BOX
Patient is a 64y old  Female who presents with a chief complaint of Transfer for CO2 angiogram; PAD and CLTI (08 Feb 2025 12:46)      INTERVAL HPI/OVERNIGHT EVENTS: seen and examined   T(C): 36.8 (02-08-25 @ 21:53), Max: 36.8 (02-08-25 @ 05:12)  HR: 109 (02-08-25 @ 21:53) (92 - 109)  BP: 113/72 (02-08-25 @ 21:53) (105/70 - 126/78)  RR: 18 (02-08-25 @ 21:53) (18 - 18)  SpO2: 94% (02-08-25 @ 21:53) (94% - 95%)  Wt(kg): --  I&O's Summary    07 Feb 2025 07:01  -  08 Feb 2025 07:00  --------------------------------------------------------  IN: 1255.6 mL / OUT: 2850 mL / NET: -1594.4 mL    08 Feb 2025 07:01  -  08 Feb 2025 23:44  --------------------------------------------------------  IN: 760 mL / OUT: 1400 mL / NET: -640 mL        PAST MEDICAL & SURGICAL HISTORY:  DM (diabetes mellitus)      Diabetic foot ulcer      Congestive heart failure (CHF)      CAD (coronary artery disease)      Cataract of both eyes, unspecified cataract type      History of cholecystectomy          SOCIAL HISTORY  Alcohol:  Tobacco:  Illicit substance use:    FAMILY HISTORY:    REVIEW OF SYSTEMS:  CONSTITUTIONAL: No fever, weight loss, or fatigue  EYES: No eye pain, visual disturbances, or discharge  ENMT:  No difficulty hearing, tinnitus, vertigo; No sinus or throat pain  NECK: No pain or stiffness  RESPIRATORY: No cough, wheezing, chills or hemoptysis; No shortness of breath  CARDIOVASCULAR: No chest pain, palpitations, dizziness, or leg swelling  GASTROINTESTINAL: No abdominal or epigastric pain. No nausea, vomiting, or hematemesis; No diarrhea or constipation. No melena or hematochezia.  GENITOURINARY: No dysuria, frequency, hematuria, or incontinence  NEUROLOGICAL: No headaches, memory loss, loss of strength, numbness, or tremors  SKIN: No itching, burning, rashes, or lesions   LYMPH NODES: No enlarged glands  ENDOCRINE: No heat or cold intolerance; No hair loss  MUSCULOSKELETAL: No joint pain or swelling; No muscle, back, or extremity pain  PSYCHIATRIC: No depression, anxiety, mood swings, or difficulty sleeping  HEME/LYMPH: No easy bruising, or bleeding gums  ALLERY AND IMMUNOLOGIC: No hives or eczema    RADIOLOGY & ADDITIONAL TESTS:    Imaging Personally Reviewed:  [ ] YES  [ ] NO    Consultant(s) Notes Reviewed:  [ ] YES  [ ] NO    PHYSICAL EXAM:  GENERAL: NAD, well-groomed, well-developed  HEAD:  Atraumatic, Normocephalic  EYES: EOMI, PERRLA, conjunctiva and sclera clear  ENMT: No tonsillar erythema, exudates, or enlargement; Moist mucous membranes, Good dentition, No lesions  NECK: Supple, No JVD, Normal thyroid  NERVOUS SYSTEM:  Alert & Oriented X3, Good concentration; Motor Strength 5/5 B/L upper and lower extremities; DTRs 2+ intact and symmetric  CHEST/LUNG: Clear to percussion bilaterally; No rales, rhonchi, wheezing, or rubs  HEART: Regular rate and rhythm; No murmurs, rubs, or gallops  ABDOMEN: Soft, Nontender, Nondistended; Bowel sounds present  EXTREMITIES:  2+ Peripheral Pulses, No clubbing, cyanosis, or edema  LYMPH: No lymphadenopathy noted  SKIN: No rashes or lesions    LABS:                        9.3    12.14 )-----------( 283      ( 08 Feb 2025 06:58 )             26.8     02-08    140  |  97  |  46[H]  ----------------------------<  91  3.8   |  27  |  2.16[H]    Ca    9.2      08 Feb 2025 06:58    TPro  8.2  /  Alb  4.5  /  TBili  0.6  /  DBili  x   /  AST  22  /  ALT  14  /  AlkPhos  63  02-08    PTT - ( 08 Feb 2025 02:25 )  PTT:60.3 sec  Urinalysis Basic - ( 08 Feb 2025 06:58 )    Color: x / Appearance: x / SG: x / pH: x  Gluc: 91 mg/dL / Ketone: x  / Bili: x / Urobili: x   Blood: x / Protein: x / Nitrite: x   Leuk Esterase: x / RBC: x / WBC x   Sq Epi: x / Non Sq Epi: x / Bacteria: x      CAPILLARY BLOOD GLUCOSE      POCT Blood Glucose.: 157 mg/dL (08 Feb 2025 21:22)  POCT Blood Glucose.: 115 mg/dL (08 Feb 2025 17:15)  POCT Blood Glucose.: 94 mg/dL (08 Feb 2025 12:17)  POCT Blood Glucose.: 112 mg/dL (08 Feb 2025 08:30)        Urinalysis Basic - ( 08 Feb 2025 06:58 )    Color: x / Appearance: x / SG: x / pH: x  Gluc: 91 mg/dL / Ketone: x  / Bili: x / Urobili: x   Blood: x / Protein: x / Nitrite: x   Leuk Esterase: x / RBC: x / WBC x   Sq Epi: x / Non Sq Epi: x / Bacteria: x        MEDICATIONS  (STANDING):  ampicillin/sulbactam  IVPB 3 Gram(s) IV Intermittent every 12 hours  ampicillin/sulbactam  IVPB      aspirin enteric coated 81 milliGRAM(s) Oral daily  atorvastatin 40 milliGRAM(s) Oral at bedtime  benzocaine/menthol Lozenge 1 Lozenge Oral once  bisacodyl 5 milliGRAM(s) Oral every 12 hours  buMETAnide Infusion 1.5 mG/Hr (7.5 mL/Hr) IV Continuous <Continuous>  dextrose 5%. 1000 milliLiter(s) (100 mL/Hr) IV Continuous <Continuous>  dextrose 5%. 1000 milliLiter(s) (50 mL/Hr) IV Continuous <Continuous>  dextrose 50% Injectable 25 Gram(s) IV Push once  dextrose 50% Injectable 12.5 Gram(s) IV Push once  dextrose 50% Injectable 25 Gram(s) IV Push once  DOBUTamine Infusion 5 MICROgram(s)/kG/Min (10.8 mL/Hr) IV Continuous <Continuous>  glucagon  Injectable 1 milliGRAM(s) IntraMuscular once  insulin glargine Injectable (LANTUS) 18 Unit(s) SubCutaneous at bedtime  insulin lispro (ADMELOG) corrective regimen sliding scale   SubCutaneous three times a day before meals  insulin lispro (ADMELOG) corrective regimen sliding scale   SubCutaneous at bedtime  insulin lispro Injectable (ADMELOG) 2 Unit(s) SubCutaneous three times a day with meals  metoprolol succinate ER 25 milliGRAM(s) Oral daily  pantoprazole  Injectable 40 milliGRAM(s) IV Push daily  polyethylene glycol 3350 17 Gram(s) Oral daily  rivaroxaban 2.5 milliGRAM(s) Oral two times a day with meals  senna 2 Tablet(s) Oral at bedtime    MEDICATIONS  (PRN):  acetaminophen     Tablet .. 650 milliGRAM(s) Oral every 6 hours PRN Mild Pain (1 - 3)  dextrose Oral Gel 15 Gram(s) Oral once PRN Blood Glucose LESS THAN 70 milliGRAM(s)/deciliter  melatonin 3 milliGRAM(s) Oral at bedtime PRN Insomnia  ondansetron Injectable 4 milliGRAM(s) IV Push every 6 hours PRN Nausea and/or Vomiting  oxyCODONE    IR 5 milliGRAM(s) Oral every 4 hours PRN Severe Pain (7 - 10)      Care Discussed with Consultants/Other Providers [ ] YES  [ ] NO

## 2025-02-08 NOTE — PROGRESS NOTE ADULT - ASSESSMENT
64y Female with history of CHF presents as a transfer for LE CO2 angiogram. Nephrology consulted for elevated Scr.    1) JAMEL: likely due to ATN in setting of infection/hypotension. Scr improving. UA relatively bland. FeUrea low consistent with low flow state. Renal US unremarkable. Bladder scan on 2/3 negative.  gtt as per cardiology. Avoid nephrotoxins.    2) HTN: BP low normal. Metoprolol as per cardiology. Holding ARNI.    3) LE edema: Not secondary to nephrotic syndrome given subnephrotic range proteinuria. Pt with good urine UO on bumex gtt @ 1.5 mg/hour. Consider Zaroxolyn 5mg PO daily. TTE with moderately decreased LVSF. Monitor UO.    4) Hyperphosphatemia: Resolved. Continue with low phosphorus diet.         [facility] NEPHROLOGY  Raji Waterman M.D.  Mars Feliz D.O.  Kelley Parks M.D.  MD Nancy Kulkarni, MSN, ANP-C    Telephone: [phone]  Facsimile: [phone] [place] [address], [address]  [place] [address]

## 2025-02-08 NOTE — DISCHARGE NOTE PROVIDER - CARE PROVIDERS DIRECT ADDRESSES
,DirectAddress_Unknown,DirectAddress_Unknown,DirectAddress_Unknown,[email or web address]

## 2025-02-08 NOTE — DISCHARGE NOTE PROVIDER - PROVIDER TOKENS
PROVIDER:[TOKEN:[[id number]],FOLLOWUP:[1 week]],PROVIDER:[TOKEN:[[id number]],FOLLOWUP:[1 week]],PROVIDER:[TOKEN:[[id number]],FOLLOWUP:[1 week]],PROVIDER:[TOKEN:[[id number]],FOLLOWUP:[1 week]],PROVIDER:[TOKEN:[[id number]],FOLLOWUP:[2 months]],PROVIDER:[TOKEN:[[id number]],FOLLOWUP:[1 week]]

## 2025-02-08 NOTE — DISCHARGE NOTE PROVIDER - NSDCCPCAREPLAN_GEN_ALL_CORE_FT
PRINCIPAL DISCHARGE DIAGNOSIS  Diagnosis: Congestive heart failure (CHF)  Assessment and Plan of Treatment: Weigh yourself daily.  If you gain 3lbs in 3 days, or 5lbs in a week call your Health Care Provider.  Do not eat or drink foods containing more than 2000mg of salt (sodium) in your diet every day.  Call your Health Care Provider if you have any swelling or increased swelling in your feet, ankles, and/or stomach.  Take all of your medication as directed.  If you become dizzy call your Health Care Provider.  Please follow up with primary care, pulmonology, and cardiology upon hospital discharge.      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  Left lower extremity to be followed up as an outpatient.  Please continue all medications as prescribed.  Please follow up with primary care and vascular surgery upon hospital discharge.  Please seek prompt medical attention for any new or worsening symptoms.    Diagnosis: Type 2 diabetes mellitus with hyperglycemia  Assessment and Plan of Treatment: Please follow a carbohydrate consistent, low glucose diet.  Please continue local wound care to your feet.  Please continue all medications as prescribed.  Please follow up with primary care, podiatry, and endocrinology upon hospital discharge.  Please seek prompt medical attention for any new or worsening symptoms.    Diagnosis: JAMEL (acute kidney injury)  Assessment and Plan of Treatment: Improved.  You underwent temporary hemodialysis. This is no longer needed.  Please follow up with primary care and nephrology upon hospital discharge.  Please seek prompt medical attention for any new or worsening symptoms.    Diagnosis: Anemia of chronic disease  Assessment and Plan of Treatment: Stable.  You received IV iron infusions during your admission, as well as EPO injections to improve your anemia.  Monitor yourself for any signs or symptoms of bleeding.  Please follow up with primary care and nephrology upon hospital discharge.  Please seek prompt medical attention for any new or worsening symptoms.    Diagnosis: Urinary retention  Assessment and Plan of Treatment: Montgomery cathter in place.  You were treated for urinary tract infection with IV antibiotics.  Please keep the catheter site clean and dry.  Please follow up with primary care and urology upon hospital discharge to have the catheter removed.  Please seek prompt medical attention for any new or worsening symptoms, including but not limited to: catheter malfunction.    Diagnosis: CAD (coronary artery disease)  Assessment and Plan of Treatment: You underwent cardiac catheterization with stent placement.  Continue all medications as prescribed.  Please follow up with primary care and cardiology upon hospital discharge.  Please seek prompt medical attention for any new or worsening symptoms.    Diagnosis: Sinus tachycardia  Assessment and Plan of Treatment: Please continue medications as prescribed.  Please follow up with primary care and electrophysiology upon hospital discharge.  Please seek prompt medical attention for any new or worsening symptoms.

## 2025-02-08 NOTE — DISCHARGE NOTE PROVIDER - CARE PROVIDER_API CALL
Yordy Gilmore  Cardiology  [address] [place] [address]  Phone: [phone]  Fax: [phone]  Follow Up Time: 1 week    Tyson Brown  Pulmonary Disease  [address], [address]  [place] [address]  Phone: [phone]  Fax: [phone]  Follow Up Time: 1 week    Mars Feliz  Nephrology  [address], [address] [address]  Phone: [phone]  Fax: [phone]  Follow Up Time: 1 week    Bannazadeh, Mohsen  Vascular Surgery  [address] [address]  Phone: [phone]  Fax: [phone]  Follow Up Time: 1 week    Manav Cramer  Cardiac Electrophysiology  [address] [place] [address]  Phone: [phone]  Fax: [phone]  Follow Up Time: 2 months    Tez Rosen  Podiatric Medicine and Surgery  [address] [place] [address]  Phone: [phone]  Fax: [phone]  Follow Up Time: 1 week

## 2025-02-08 NOTE — DISCHARGE NOTE PROVIDER - DISCHARGE SERVICE FOR PATIENT
on the discharge service for the patient. I have reviewed and made amendments to the documentation where necessary.
26.9

## 2025-02-08 NOTE — DISCHARGE NOTE PROVIDER - HOSPITAL COURSE
HPI:  64F, hx of CHF (last TTE on 1/16/25 EF 30-35%, G2DD), DM, diabetic foot ulcer with a recent admission at [facility] for CHF exacerbation presented as a transfer for worsening R. leg pain and fluid secretion, On non-invasive imaging demonstrating severe depletion of RLE blood flow beyond the popliteal vessel at knee and similar findings in L. Was transferred to [facility] for CO2 angiogram with Dr. Baltazar. (29 Jan 2025 20:05)    Hospital Course:  The patient underwent extensive evaluation and management for multiple complex issues:  PAD/CLTI: CO2 angiogram performed. Continued on dual antiplatelet therapy (DAPT) and statin. Wound care consultation obtained. Vascular and podiatry followed.  ADHF/HFrEF: Required multiple medications including diuretics (torsemide, spironolactone), entresto, and metoprolol. Echocardiograms showed severely decreased LV systolic function and reduced RV systolic function. Case discussed with heart failure, cardiology, and electrophysiology. Goal to stabilize off inotropic support before AICD placement.  Bradycardia/Tachycardia: Experienced episodes of both bradycardia and tachycardia likely related to medications and low cardiac output. Monitored on telemetry. Cardiology and pulmonology followed.  Acute Kidney Injury (JAMEL) and Metabolic Acidosis: Developed JAMEL likely due to cardiorenal syndrome and medications. Required hemodialysis (HD) with permacath placement and removal. JAMEL resolved.  Electrolyte Abnormalities: Experienced fluctuations in sodium levels, ranging from hypernatremia to hyponatremia, managed with adjustments to fluid and electrolyte balance.  Urinary Tract Infection (UTI): Treated with ceftriaxone. Patient had stiles catheter placed, failed TOV, and stiles was then replaced.  Coronary Artery Disease (CAD): Found to have multivessel CAD. Deemed not a candidate for CABG. Underwent percutaneous coronary intervention (PCI). Continued on DAPT and statin. Interventional cardiology, cardiology, and heart failure followed.  Bilateral Lower Extremity Edema, Pericardial Effusion, Pleural Effusion: Managed with diuresis and HD.  Transaminitis: Attributed to hepatic congestion.  Leukocytosis: Likely related to lower extremity ulcers. Monitored off antibiotics.  Anemia: Mixed etiology. Received iron infusions initially, then transitioned to erythropoietin (EPO).  Diabetes Mellitus: Managed with insulin.  Hyperlipidemia and Hypertension: Managed with statin and metoprolol.  PT evaluated the patient and recc BONNIE.  The patient was medically cleared for dc with outpatient follow ups.    Important Medication Changes and Reason:  Please see medication reconciliation.    Active or Pending Issues Requiring Follow-up:  PCP, cards, EP, endo, pulm, nephro, uro, and vascular surgery.    Advanced Directives:   [X] Full code  [ ] DNR  [ ] Hospice    Discharge Diagnoses:  CHF  PAD  JAMEL on CKD  DM2  Anemia  Urinary Retention  CAD  Sinus Tachycardia HPI:  64F, hx of CHF (last TTE on 1/16/25 EF 30-35%, G2DD), DM, diabetic foot ulcer with a recent admission at [facility] for CHF exacerbation presented as a transfer for worsening R. leg pain and fluid secretion, On non-invasive imaging demonstrating severe depletion of RLE blood flow beyond the popliteal vessel at knee and similar findings in L. Was transferred to [facility] for CO2 angiogram with Dr. Baltazar. (29 Jan 2025 20:05)    Hospital Course:  The patient underwent extensive evaluation and management for multiple complex issues:  PAD/CLTI: CO2 angiogram performed. Continued on dual antiplatelet therapy (DAPT) and statin. Wound care consultation obtained. Vascular and podiatry followed.  ADHF/HFrEF: Required multiple medications including diuretics (torsemide, spironolactone), entresto, and metoprolol. Echocardiograms showed severely decreased LV systolic function and reduced RV systolic function. Case discussed with heart failure, cardiology, and electrophysiology. Goal to stabilize off inotropic support before AICD placement.  Bradycardia/Tachycardia: Experienced episodes of both bradycardia and tachycardia likely related to medications and low cardiac output. Monitored on telemetry. Cardiology and pulmonology followed.  Acute Kidney Injury (JAMEL) and Metabolic Acidosis: Developed JAMEL likely due to cardiorenal syndrome and medications. Required hemodialysis (HD) with permacath placement and removal. JAMEL resolved.  Electrolyte Abnormalities: Experienced fluctuations in sodium levels, ranging from hypernatremia to hyponatremia, managed with adjustments to fluid and electrolyte balance.  Urinary Tract Infection (UTI): Treated with ceftriaxone. Patient had stiles catheter placed, failed TOV, and stiles was then replaced.  Coronary Artery Disease (CAD): Found to have multivessel CAD. Deemed not a candidate for CABG. Underwent percutaneous coronary intervention (PCI). Continued on DAPT and statin. Interventional cardiology, cardiology, and heart failure followed.  Bilateral Lower Extremity Edema, Pericardial Effusion, Pleural Effusion: Managed with diuresis and HD.  Transaminitis: Attributed to hepatic congestion.  Leukocytosis: Likely related to lower extremity ulcers. Monitored off antibiotics.  Anemia: Mixed etiology. Received iron infusions initially, then transitioned to erythropoietin (EPO).  Diabetes Mellitus: Managed with insulin.  Hyperlipidemia and Hypertension: Managed with statin and metoprolol.  PT evaluated the patient and recc BONNIE.  The patient was medically cleared for dc with outpatient follow ups.    Important Medication Changes and Reason:  Please see medication reconciliation.    Active or Pending Issues Requiring Follow-up:  PCP, cards, EP, endo, pulm, nephro, uro, and vascular surgery.    Advanced Directives:   [X] Full code  [ ] DNR  [ ] Hospice    Discharge Diagnoses:  CHF  Severe PAD  JAMEL on CKD  DM2  Anemia  Urinary Retention  CAD  Sinus Tachycardia

## 2025-02-08 NOTE — CHART NOTE - NSCHARTNOTEFT_GEN_A_CORE
Notified by RN; patient with " leg numbness ". Pt seen and evaluated at bedside, sitting in chair. Pt alert and oriented. Pt reports cramping in legs, denies loss of sensation, numbness or tingling.  Bilateral lower extremity swollen bandages intact. Doppler notes + blood flow however legs cool to touch. Pt with significant PAD and pending angio. Will prescribe gabapentin 100mg x 1.       Britt Cannon NP  Department of Medicine   Spectra   [phone]

## 2025-02-08 NOTE — DISCHARGE NOTE PROVIDER - NSDCMRMEDTOKEN_GEN_ALL_CORE_FT
acetaminophen 500 mg oral tablet: 2 tab(s) orally every 8 hours As needed Moderate Pain (4 - 6)  Admelog 100 units/mL injectable solution: injectable 4 times a day (with meals and at bedtime) Subcutaneous 3 times a day before meals AND at bedtime  1 unit if Glucose 151-200  2 unit if Glucose 201-250  3 unit if Glucose 251-300  4 unit if Glucose 301-350  5 unit if Glucose 351-400  6 units if Glucose &gt; 400 + Contact MD  amoxicillin-clavulanate 500 mg-125 mg oral tablet: 1 tab(s) orally every 12 hours  aspirin 81 mg oral tablet, chewable: 1 tab(s) orally once a day  atorvastatin 40 mg oral tablet: 1 tab(s) orally once a day (at bedtime)  DOBUTamine: now 500 mg in dextrose 5% 250 mL, infuse at 5.87 mL/Hr  Dose Rate: 2.5 Microgram/kg/Min  heparin: prn 6500 UNITS iv push, every 6 hours, PRN for aPTT less than 40  heparin: prn 3000 units, IV Push, every 6 hours, PRN for aPTT between 40-57  heparin 100 units/mL-D5% intravenous solution: intravenous now 84325 UNITS IN DEXTROSE 5% 250 ML, INFUSE AT 14 ML/HR  TARGET APTT = 58-99 SECONDS  USE FULL ANTICOAGULATION NOMOGRAM  insulin glargine 100 units/mL subcutaneous solution: 16 unit(s) subcutaneous once a day (at bedtime)  melatonin 3 mg oral tablet: 1 tab(s) orally once a day (at bedtime) As needed Insomnia  metoprolol succinate 25 mg oral tablet, extended release: 1 tab(s) orally once a day  ondansetron 2 mg/mL injectable solution: 4 milligram(s) injectable every 8 hours as needed for  nausea  oxyCODONE 5 mg oral tablet: 1 tab(s) orally every 6 hours As needed Severe Pain (7 - 10)  polyethylene glycol 3350 oral powder for reconstitution: 17 gram(s) orally once a day  senna leaf extract oral tablet: 2 tab(s) orally once a day (at bedtime)   furosemide 40 mg oral tablet: 1 tab(s) orally once a day  Insulin Aspart: via CeQur insulin patch (~2-10 units 3 times a day with meals)  metoprolol succinate 25 mg oral tablet, extended release: 1 tab(s) orally once a day  Tresiba FlexTouch 200 units/mL subcutaneous solution: 40 unit(s) subcutaneous once a day (at bedtime)   acetaminophen 325 mg oral tablet: 3 tab(s) orally every 8 hours  Aldactone 25 mg oral tablet: 0.5 tab(s) orally once a day  aspirin 81 mg oral delayed release tablet: 1 tab(s) orally once a day  atorvastatin 40 mg oral tablet: 1 tab(s) orally once a day (at bedtime)  cefTRIAXone 1 g/50 mL-iso-osmotic dextrose intravenous solution: 1 gram(s) intravenously once a day infuse over 30 minutes  clopidogrel 75 mg oral tablet: 1 tab(s) orally once a day  gabapentin 300 mg oral capsule: 1 cap(s) orally every 8 hours  Insulin Aspart: via CeQur insulin patch (~2-10 units 3 times a day with meals)  methocarbamol 750 mg oral tablet: 1 tab(s) orally every 6 hours  metoprolol succinate 25 mg oral tablet, extended release: 1 tab(s) orally once a day  pantoprazole 40 mg intravenous injection: 40 milligram(s) intravenous once a day  sacubitril-valsartan 24 mg-26 mg oral tablet: 1 tab(s) orally 2 times a day  torsemide 10 mg oral tablet: 1 tab(s) orally once a day  Tresiba FlexTouch 200 units/mL subcutaneous solution: 40 unit(s) subcutaneous once a day (at bedtime)   acetaminophen 325 mg oral tablet: 3 tab(s) orally every 8 hours  Aldactone 25 mg oral tablet: 0.5 tab(s) orally once a day  aspirin 81 mg oral delayed release tablet: 1 tab(s) orally once a day  atorvastatin 40 mg oral tablet: 1 tab(s) orally once a day (at bedtime)  cefTRIAXone 1 g/50 mL-iso-osmotic dextrose intravenous solution: 1 gram(s) intravenously once a day infuse over 30 minutes  clopidogrel 75 mg oral tablet: 1 tab(s) orally once a day  gabapentin 300 mg oral capsule: 1 cap(s) orally every 8 hours  HumaLOG 100 units/mL injectable solution: 9 unit(s) injectable 3 times a day (with meals) HOLD IF PATIENT IS NOT EATING  Lantus 100 units/mL subcutaneous solution: 16 unit(s) subcutaneous once a day (at bedtime)  methocarbamol 750 mg oral tablet: 1 tab(s) orally every 6 hours  metoprolol succinate 25 mg oral tablet, extended release: 1 tab(s) orally once a day  pantoprazole 40 mg intravenous injection: 40 milligram(s) intravenous once a day  sacubitril-valsartan 24 mg-26 mg oral tablet: 1 tab(s) orally 2 times a day  torsemide 10 mg oral tablet: 1 tab(s) orally once a day

## 2025-02-08 NOTE — PROGRESS NOTE ADULT - SUBJECTIVE AND OBJECTIVE BOX
[facility] NEPHROLOGY- PROGRESS NOTE    64y Female with history of CHF presents as a transfer for LE CO2 angiogram. Nephrology consulted for elevated Scr.    REVIEW OF SYSTEMS:  Gen: no fevers  Cards: no chest pain  Resp: + dyspnea with exertion, + cough  GI: no nausea and vomiting this morning, no diarrhea  Vascular: + LE edema    No Known Allergies      Hospital Medications: Medications reviewed      VITALS:  T(F): 98.3 (02-08-25 @ 09:09), Max: 98.3 (02-07-25 @ 21:39)  HR: 100 (02-08-25 @ 09:09)  BP: 105/70 (02-08-25 @ 09:09)  RR: 18 (02-08-25 @ 09:09)  SpO2: 94% (02-08-25 @ 09:09)  Wt(kg): --    02-07 @ 07:01  -  02-08 @ 07:00  --------------------------------------------------------  IN: 1255.6 mL / OUT: 2850 mL / NET: -1594.4 mL    02-08 @ 07:01  -  02-08 @ 11:12  --------------------------------------------------------  IN: 180 mL / OUT: 0 mL / NET: 180 mL      PHYSICAL EXAM:    Gen: NAD, calm  Cards: RRR, +S1/S2, no M/G/R  Resp: bibasilar rales  GI: soft, NT/ND, NABS  Vascular: 2+ RLE edema > LLE edema      LABS:  02-08  140 <--, 139 <--, 140 <--, 137 <--, 137 <--, 135 <--, 136 <--  140  |  97  |  46[H]  ----------------------------<  91  3.8   |  27  |  2.16[H]    Ca    9.2      08 Feb 2025 06:58    TPro  8.2  /  Alb  4.5  /  TBili  0.6  /  DBili      /  AST  22  /  ALT  14  /  AlkPhos  63  02-08    Creatinine Trend: 2.16 <--, 2.27 <--, 2.40 <--, 2.56 <--, 2.82 <--, 2.98 <--, 3.31 <--, 2.65 <--                        9.3    12.14 )-----------( 283      ( 08 Feb 2025 06:58 )             26.8     Urine Studies:  Urinalysis Basic - ( 08 Feb 2025 06:58 )    Color:  / Appearance:  / SG:  / pH:   Gluc: 91 mg/dL / Ketone:   / Bili:  / Urobili:    Blood:  / Protein:  / Nitrite:    Leuk Esterase:  / RBC:  / WBC    Sq Epi:  / Non Sq Epi:  / Bacteria:

## 2025-02-09 LAB
ANION GAP SERPL CALC-SCNC: 16 MMOL/L — SIGNIFICANT CHANGE UP (ref 5–17)
BUN SERPL-MCNC: 44 MG/DL — HIGH (ref 7–23)
CALCIUM SERPL-MCNC: 9.2 MG/DL — SIGNIFICANT CHANGE UP (ref 8.4–10.5)
CHLORIDE SERPL-SCNC: 96 MMOL/L — SIGNIFICANT CHANGE UP (ref 96–108)
CO2 SERPL-SCNC: 28 MMOL/L — SIGNIFICANT CHANGE UP (ref 22–31)
CREAT SERPL-MCNC: 2.07 MG/DL — HIGH (ref 0.5–1.3)
EGFR: 26 ML/MIN/1.73M2 — LOW
EGFR: 26 ML/MIN/1.73M2 — LOW
GLUCOSE BLDC GLUCOMTR-MCNC: 129 MG/DL — HIGH (ref 70–99)
GLUCOSE BLDC GLUCOMTR-MCNC: 138 MG/DL — HIGH (ref 70–99)
GLUCOSE BLDC GLUCOMTR-MCNC: 163 MG/DL — HIGH (ref 70–99)
GLUCOSE BLDC GLUCOMTR-MCNC: 207 MG/DL — HIGH (ref 70–99)
GLUCOSE SERPL-MCNC: 106 MG/DL — HIGH (ref 70–99)
POTASSIUM SERPL-MCNC: 3.5 MMOL/L — SIGNIFICANT CHANGE UP (ref 3.5–5.3)
POTASSIUM SERPL-SCNC: 3.5 MMOL/L — SIGNIFICANT CHANGE UP (ref 3.5–5.3)
SODIUM SERPL-SCNC: 140 MMOL/L — SIGNIFICANT CHANGE UP (ref 135–145)

## 2025-02-09 RX ADMIN — OXYCODONE HYDROCHLORIDE 5 MILLIGRAM(S): 30 TABLET ORAL at 14:31

## 2025-02-09 RX ADMIN — ATORVASTATIN CALCIUM 40 MILLIGRAM(S): 80 TABLET, FILM COATED ORAL at 21:57

## 2025-02-09 RX ADMIN — RIVAROXABAN 2.5 MILLIGRAM(S): 10 TABLET, FILM COATED ORAL at 10:06

## 2025-02-09 RX ADMIN — OXYCODONE HYDROCHLORIDE 5 MILLIGRAM(S): 30 TABLET ORAL at 13:31

## 2025-02-09 RX ADMIN — INSULIN LISPRO 2: 100 INJECTION, SOLUTION INTRAVENOUS; SUBCUTANEOUS at 13:13

## 2025-02-09 RX ADMIN — AMPICILLIN SODIUM AND SULBACTAM SODIUM 200 GRAM(S): 1; .5 INJECTION, POWDER, FOR SOLUTION INTRAMUSCULAR; INTRAVENOUS at 21:57

## 2025-02-09 RX ADMIN — RIVAROXABAN 2.5 MILLIGRAM(S): 10 TABLET, FILM COATED ORAL at 18:11

## 2025-02-09 RX ADMIN — OXYCODONE HYDROCHLORIDE 5 MILLIGRAM(S): 30 TABLET ORAL at 02:15

## 2025-02-09 RX ADMIN — Medication 40 MILLIGRAM(S): at 13:30

## 2025-02-09 RX ADMIN — OXYCODONE HYDROCHLORIDE 5 MILLIGRAM(S): 30 TABLET ORAL at 09:08

## 2025-02-09 RX ADMIN — Medication 650 MILLIGRAM(S): at 14:31

## 2025-02-09 RX ADMIN — OXYCODONE HYDROCHLORIDE 5 MILLIGRAM(S): 30 TABLET ORAL at 03:15

## 2025-02-09 RX ADMIN — OXYCODONE HYDROCHLORIDE 5 MILLIGRAM(S): 30 TABLET ORAL at 08:08

## 2025-02-09 RX ADMIN — Medication 650 MILLIGRAM(S): at 13:31

## 2025-02-09 RX ADMIN — Medication 2 TABLET(S): at 21:57

## 2025-02-09 RX ADMIN — INSULIN LISPRO 2 UNIT(S): 100 INJECTION, SOLUTION INTRAVENOUS; SUBCUTANEOUS at 09:28

## 2025-02-09 RX ADMIN — POLYETHYLENE GLYCOL 3350 17 GRAM(S): 17 POWDER, FOR SOLUTION ORAL at 13:30

## 2025-02-09 RX ADMIN — Medication 5 MILLIGRAM(S): at 05:56

## 2025-02-09 RX ADMIN — OXYCODONE HYDROCHLORIDE 5 MILLIGRAM(S): 30 TABLET ORAL at 18:11

## 2025-02-09 RX ADMIN — INSULIN LISPRO 2 UNIT(S): 100 INJECTION, SOLUTION INTRAVENOUS; SUBCUTANEOUS at 18:10

## 2025-02-09 RX ADMIN — AMPICILLIN SODIUM AND SULBACTAM SODIUM 200 GRAM(S): 1; .5 INJECTION, POWDER, FOR SOLUTION INTRAMUSCULAR; INTRAVENOUS at 10:07

## 2025-02-09 RX ADMIN — INSULIN LISPRO 2 UNIT(S): 100 INJECTION, SOLUTION INTRAVENOUS; SUBCUTANEOUS at 13:13

## 2025-02-09 RX ADMIN — INSULIN GLARGINE-YFGN 18 UNIT(S): 100 INJECTION, SOLUTION SUBCUTANEOUS at 21:56

## 2025-02-09 RX ADMIN — METOPROLOL SUCCINATE 25 MILLIGRAM(S): 50 TABLET, EXTENDED RELEASE ORAL at 05:56

## 2025-02-09 RX ADMIN — Medication 5 MILLIGRAM(S): at 18:12

## 2025-02-09 RX ADMIN — OXYCODONE HYDROCHLORIDE 5 MILLIGRAM(S): 30 TABLET ORAL at 19:11

## 2025-02-09 RX ADMIN — Medication 81 MILLIGRAM(S): at 13:31

## 2025-02-09 RX ADMIN — INSULIN LISPRO 1: 100 INJECTION, SOLUTION INTRAVENOUS; SUBCUTANEOUS at 18:10

## 2025-02-09 NOTE — PROGRESS NOTE ADULT - SUBJECTIVE AND OBJECTIVE BOX
MR#[id number]  PATIENT NAME:COLE ALBA    DATE OF SERVICE: 02-09-25 @ 17:57  Patient was seen and examined by Yordy Gilmore MD on    02-09-25 @ 17:57 .  Interim events noted.Consultant notes ,Labs,Telemetry reviewed by me       HOSPITAL COURSE: HPI:  64F, hx of CHF (last TTE on 1/16/25 EF 30-35%, G2DD), DM, diabetic foot ulcer with a recent admission at [facility] for CHF exacerbation presented as a transfer for worsening R. leg pain and fluid secretion, On non-invasive imaging demonstrating severe depletion of RLE blood flow beyond the popliteal vessel at knee and similar findings in L. Was transferred to [facility] for CO2 angiogram with Dr. Baltazar. (29 Jan 2025 20:05)      INTERIM EVENTS:Patient seen at bedside ,interim events noted.      PMH -reviewed admission note, no change since admission  HEART FAILURE: Acute[ ]Chronic[ ] Systolic[ ] Diastolic[ ] Combined Systolic and Diastolic[ ]  CAD[ ] CABG[ ] PCI[ ]  DEVICES[ ] PPM[ ] ICD[ ] ILR[ ]  ATRIAL FIBRILLATION[ ] Paroxysmal[ ] Permanent[ ] CHADS2-[  ]  JAMEL[ ] CKD1[ ] CKD2[ ] CKD3[ ] CKD4[ ] ESRD[ ]  COPD[ ] HTN[ ]   DM[ ] Type1[ ] Type 2[ ]   CVA[ ] Paresis[ ]    AMBULATION: Assisted[ ] Cane/walker[ ] Independent[ ]    MEDICATIONS  (STANDING):  ampicillin/sulbactam  IVPB 3 Gram(s) IV Intermittent every 12 hours  ampicillin/sulbactam  IVPB      aspirin enteric coated 81 milliGRAM(s) Oral daily  atorvastatin 40 milliGRAM(s) Oral at bedtime  benzocaine/menthol Lozenge 1 Lozenge Oral once  bisacodyl 5 milliGRAM(s) Oral every 12 hours  buMETAnide Infusion 1.5 mG/Hr (7.5 mL/Hr) IV Continuous <Continuous>  dextrose 5%. 1000 milliLiter(s) (100 mL/Hr) IV Continuous <Continuous>  dextrose 5%. 1000 milliLiter(s) (50 mL/Hr) IV Continuous <Continuous>  dextrose 50% Injectable 25 Gram(s) IV Push once  dextrose 50% Injectable 12.5 Gram(s) IV Push once  dextrose 50% Injectable 25 Gram(s) IV Push once  DOBUTamine Infusion 5 MICROgram(s)/kG/Min (10.8 mL/Hr) IV Continuous <Continuous>  glucagon  Injectable 1 milliGRAM(s) IntraMuscular once  insulin glargine Injectable (LANTUS) 18 Unit(s) SubCutaneous at bedtime  insulin lispro (ADMELOG) corrective regimen sliding scale   SubCutaneous three times a day before meals  insulin lispro (ADMELOG) corrective regimen sliding scale   SubCutaneous at bedtime  insulin lispro Injectable (ADMELOG) 2 Unit(s) SubCutaneous three times a day with meals  metolazone 5 milliGRAM(s) Oral daily  metoprolol succinate ER 25 milliGRAM(s) Oral daily  pantoprazole  Injectable 40 milliGRAM(s) IV Push daily  polyethylene glycol 3350 17 Gram(s) Oral daily  rivaroxaban 2.5 milliGRAM(s) Oral two times a day with meals  senna 2 Tablet(s) Oral at bedtime    MEDICATIONS  (PRN):  acetaminophen     Tablet .. 650 milliGRAM(s) Oral every 6 hours PRN Mild Pain (1 - 3)  dextrose Oral Gel 15 Gram(s) Oral once PRN Blood Glucose LESS THAN 70 milliGRAM(s)/deciliter  melatonin 3 milliGRAM(s) Oral at bedtime PRN Insomnia  ondansetron Injectable 4 milliGRAM(s) IV Push every 6 hours PRN Nausea and/or Vomiting  oxyCODONE    IR 5 milliGRAM(s) Oral every 4 hours PRN Severe Pain (7 - 10)            REVIEW OF SYSTEMS:  Constitutional: [ ] fever, [ ]weight loss,  [ ]fatigue [ ]weight gain  Eyes: [ ] visual changes  Respiratory: [ ]shortness of breath;  [ ] cough, [ ]wheezing, [ ]chills, [ ]hemoptysis  Cardiovascular: [ ] chest pain, [ ]palpitations, [ ]dizziness,  [ ]leg swelling[ ]orthopnea[ ]PND  Gastrointestinal: [ ] abdominal pain, [ ]nausea, [ ]vomiting,  [ ]diarrhea [ ]Constipation [ ]Melena  Genitourinary: [ ] dysuria, [ ] hematuria [ ][place]  Neurologic: [ ] headaches [ ] tremors[ ]weakness [ ]Paralysis Right[ ] Left[ ]  Skin: [ ] itching, [ ]burning, [ ] rashes  Endocrine: [ ] heat or cold intolerance  Musculoskeletal: [ ] joint pain or swelling; [ ] muscle, back, or extremity pain  Psychiatric: [ ] depression, [ ]anxiety, [ ]mood swings, or [ ]difficulty sleeping  Hematologic: [ ] easy bruising, [ ] bleeding gums    [ ] All remaining systems negative except as per above.   [ ]Unable to obtain.  [x] No change in ROS since admission      Vital Signs Last 24 Hrs  T(C): 36.8 (09 Feb 2025 17:17), Max: 37 (09 Feb 2025 05:13)  T(F): 98.2 (09 Feb 2025 17:17), Max: 98.6 (09 Feb 2025 05:13)  HR: 96 (09 Feb 2025 17:17) (96 - 110)  BP: 107/66 (09 Feb 2025 17:17) (104/69 - 120/77)  RR: 18 (09 Feb 2025 17:17) (18 - 18)  SpO2: 98% (09 Feb 2025 17:17) (93% - 98%)    Parameters below as of 09 Feb 2025 17:17  Patient On (Oxygen Delivery Method): room air      I&O's Summary    08 Feb 2025 07:01  -  09 Feb 2025 07:00  --------------------------------------------------------  IN: 1260 mL / OUT: 1400 mL / NET: -140 mL    09 Feb 2025 07:01  -  09 Feb 2025 17:57  --------------------------------------------------------  IN: 200 mL / OUT: 800 mL / NET: -600 mL        PHYSICAL EXAM:  General: No acute distress BMI-  HEENT: EOMI, PERRL  Neck: Supple, [ ] JVD  Lungs: Equal air entry bilaterally; [ ] rales [ ] wheezing [ ] rhonchi  Heart: Regular rate and rhythm; [x ] murmur   2/6 [ x] systolic [ ] diastolic [ ] radiation[ ] rubs [ ]  gallops  Abdomen: Nontender, bowel sounds present  Extremities: No clubbing, cyanosis, [ ] edema [ ]Pulses  equal and intact  Nervous system:  Alert & Oriented X3, no focal deficits  Psychiatric: Normal affect  Skin: No rashes or lesions    LABS:  02-09    140  |  96  |  44[H]  ----------------------------<  106[H]  3.5   |  28  |  2.07[H]    Ca    9.2      09 Feb 2025 07:25    TPro  8.2  /  Alb  4.5  /  TBili  0.6  /  DBili  x   /  AST  22  /  ALT  14  /  AlkPhos  63  02-08    Creatinine Trend: 2.07<--, 2.16<--, 2.27<--, 2.40<--, 2.56<--, 2.82<--                        9.3    12.14 )-----------( 283      ( 08 Feb 2025 06:58 )             26.8     PTT - ( 08 Feb 2025 02:25 )  PTT:60.3 sec

## 2025-02-09 NOTE — PROGRESS NOTE ADULT - ASSESSMENT
63 YO F with PMHx of HFrEF 30-35 and grade 2 ddfxn (last TTE on 01/16/25), DM2, and diabetic foot ulcer. Recent admission at [facility] for ADHF. Patient now represents to OSH and ultimately to [facility] as a transfer for worsening right leg pain and fluid secretion. As per documentation, patient was reported to have severe depletion of RLE blood flow beyond the popliteal vessel at knee and similar findings in the left. Patient was transferred to [facility] for CO2 angiogram with Dr. Baltazar. Of note, patient also with reported visual disturbance for which patient was seen and evaluated by opthalmology. Internal Medicine has been consulted on Ms. Kirby's care for medical management.     # Ac on ch systolic heart failure   - Recent admission at [facility] for ADHF  - TTE 1/16 with EF 30-35 with moderately reduced LVSF and grade 2 ddfxn, normal RVSF , trace TR/ KY, and trace pericardial effusion  cardio following   on IV bumex GTT and dobutamine     # BL LE Edema likely in setting of ADHF  - BL LE elevation and compression    # Pericardial effusion likely in setting ADHF  - TTE with trace pericardial effusion   - Repeat TTE in 1 month for further evaluation   - Diuresis per cardio/ renal.    # JAMEL with Hyponatremia and Metabolic Acidosis   - Renal eval appreciated    # Leukocytosis likely in setting of BL LE ulcers with pop occlusion   - On unasyn for ABX. Frequency increased to Q12 as per Renal   - Trend CBC, temp curve, VS and adjust as tolerated    # Foot ulcers with worsening RLE pain second to pop artery occlusion +/- diabetic ulcers   - RLE Arterial Duplex with popliteal artery occlusion   - LLE Arterial Duplex with underlying disease cannot be excluded   -c/w Iv abx   - Continue on Lipitor  - Vascular following    # Visual Disturbance  - CT Head w/ old infarcts  - On ASA and Statin   - Opthalmology eval appreciated    # Anemia likely mixed AOCD vs iron deficiency   - Monitor HH   - Transfuse for Hgb < 8  - Maintain active T/S    # Diabetes Mellitus A1C 11.6   - Continue on lantus 18 with lispro 2 and ISS     # HLD and HLD  - Continue on lipitor and toprol  - Monitor BP    bill hernandez MD  covering Dr. castellon

## 2025-02-09 NOTE — PROGRESS NOTE ADULT - ASSESSMENT
64F, hx of CHF (last TTE on 1/16/25 EF 30-35%, G2DD), DM, diabetic foot ulcer with a recent admission at [facility] for CHF exacerbation 1/14-1/17 p/w worsening R. leg pain and fluid secretion, was meeting sepsis criteria with podiatry having low suspicion for right cellulitis and admitted for continued management of HF exacerbation and needing ischemic eval.     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  -Left leg Arterial Duplex: Slightly tardus parvus waveform of the left popliteal artery is noted, for which underlying disease cannot be excluded. Posterior tibial artery waveform is nonpulsatile. Anterior tibial artery tardus parvus flow is noted.   Transferred to [facility] for CO2 angiogram as per vascular surgery    #  PAD Wound of lower extremity.   ·  Plan: Podiatry following, b/l serous bullae, no concerns for infection  Found to have RLE coolness  -Right Leg Arterial Duplex: Popliteal artery is occluded with negligible flow of right trifurcation arteries.  -Left leg Arterial Duplex: Slightly tardus parvus waveform of the left popliteal artery is noted, for which underlying disease cannot be excluded. Posterior tibial artery waveform is nonpulsatile. Anterior tibial artery tardus parvus flow is noted.  -Started on heparin gtt and dobutamine gtt -Will transfer to [facility] for CO2 angiogram as per vascular surgery as not candidate for CTA due to worsening SCr  -Keep compression dressing  -LE elevation above heart level at rest.  -Transferred to [facility] for CO2 angiogram   - Overall this patient is at   intermediate  risk (for cardiac death, nonfatal myocardial infarction, and nonfatal cardiac arrest perioperatively for this intermediate  risk procedure).   No cardiac contraindications for CO2 vangiogram  There  are  no further recommendation for risk stratifying imaging/stress testing prior to planned surgery  Plan for outpatient CO2 angiogram    # HFrEF (congestive heart failure).   ·  Plan: Hx of HF, previous admission in January, on home Lasix 40 qD, Metoprolol Succ 25 qD. Not on Entresto due to insurance issues  Last TTE: LVSF moderately decreased w/ EF 30-35 %. Moderate G2DD. Mild MR  Stress test: small-sized, moderate defect(s) in the apical wall that is predominantly fixed suggestive of an infarction with minimal marcus-infarct ischemia  Ischemic cardiomyopathy  GDMT on hold 2/2 JAMEL  Continue Dobutamine and Bumex gtt at present  Still fluid overloaded increase  5mcg  Will continue Inotrope assisted diuresis till tomorrow and transition to GDMT  Patient on Heparin gtt changed to Xarelto 2.5mg BID for PAD  Continue Bumex gtt    # JAMEL  Creatinine Trend: 2.07<--2.27<--2.56<--, 2.82<--, 2.98<--, 3.31<--, 2.65<--, 3.90<-- 1.17<--  Cardiorenal

## 2025-02-09 NOTE — PROGRESS NOTE ADULT - SUBJECTIVE AND OBJECTIVE BOX
Patient is a 64y old  Female who presents with a chief complaint of Transfer for CO2 angiogram; PAD and CLTI (09 Feb 2025 17:56)      INTERVAL HPI/OVERNIGHT EVENTS: seen and examined   T(C): 36.8 (02-09-25 @ 20:58), Max: 37 (02-09-25 @ 05:13)  HR: 92 (02-09-25 @ 20:58) (92 - 110)  BP: 115/64 (02-09-25 @ 20:58) (104/69 - 120/77)  RR: 18 (02-09-25 @ 20:58) (18 - 18)  SpO2: 95% (02-09-25 @ 20:58) (93% - 98%)  Wt(kg): --  I&O's Summary    08 Feb 2025 07:01  -  09 Feb 2025 07:00  --------------------------------------------------------  IN: 1260 mL / OUT: 1400 mL / NET: -140 mL    09 Feb 2025 07:01  -  09 Feb 2025 21:16  --------------------------------------------------------  IN: 499.6 mL / OUT: 1100 mL / NET: -600.4 mL        PAST MEDICAL & SURGICAL HISTORY:  DM (diabetes mellitus)      Diabetic foot ulcer      Congestive heart failure (CHF)      CAD (coronary artery disease)      Cataract of both eyes, unspecified cataract type      History of cholecystectomy          SOCIAL HISTORY  Alcohol:  Tobacco:  Illicit substance use:    FAMILY HISTORY:    REVIEW OF SYSTEMS:  CONSTITUTIONAL: No fever, weight loss, or fatigue  EYES: No eye pain, visual disturbances, or discharge  ENMT:  No difficulty hearing, tinnitus, vertigo; No sinus or throat pain  NECK: No pain or stiffness  RESPIRATORY: No cough, wheezing, chills or hemoptysis; No shortness of breath  CARDIOVASCULAR: No chest pain, palpitations, dizziness, or leg swelling  GASTROINTESTINAL: No abdominal or epigastric pain. No nausea, vomiting, or hematemesis; No diarrhea or constipation. No melena or hematochezia.  GENITOURINARY: No dysuria, frequency, hematuria, or incontinence  NEUROLOGICAL: No headaches, memory loss, loss of strength, numbness, or tremors  SKIN: No itching, burning, rashes, or lesions   LYMPH NODES: No enlarged glands  ENDOCRINE: No heat or cold intolerance; No hair loss  MUSCULOSKELETAL: No joint pain or swelling; No muscle, back, or extremity pain  PSYCHIATRIC: No depression, anxiety, mood swings, or difficulty sleeping  HEME/LYMPH: No easy bruising, or bleeding gums  ALLERY AND IMMUNOLOGIC: No hives or eczema    RADIOLOGY & ADDITIONAL TESTS:    Imaging Personally Reviewed:  [ ] YES  [ ] NO    Consultant(s) Notes Reviewed:  [ ] YES  [ ] NO    PHYSICAL EXAM:  GENERAL: NAD, well-groomed, well-developed  HEAD:  Atraumatic, Normocephalic  EYES: EOMI, PERRLA, conjunctiva and sclera clear  ENMT: No tonsillar erythema, exudates, or enlargement; Moist mucous membranes, Good dentition, No lesions  NECK: Supple, No JVD, Normal thyroid  NERVOUS SYSTEM:  Alert & Oriented X3, Good concentration; Motor Strength 5/5 B/L upper and lower extremities; DTRs 2+ intact and symmetric  CHEST/LUNG: Clear to percussion bilaterally; No rales, rhonchi, wheezing, or rubs  HEART: Regular rate and rhythm; No murmurs, rubs, or gallops  ABDOMEN: Soft, Nontender, Nondistended; Bowel sounds present  EXTREMITIES:  2+ Peripheral Pulses, No clubbing, cyanosis, or edema  LYMPH: No lymphadenopathy noted  SKIN: No rashes or lesions    LABS:                        9.3    12.14 )-----------( 283      ( 08 Feb 2025 06:58 )             26.8     02-09    140  |  96  |  44[H]  ----------------------------<  106[H]  3.5   |  28  |  2.07[H]    Ca    9.2      09 Feb 2025 07:25    TPro  8.2  /  Alb  4.5  /  TBili  0.6  /  DBili  x   /  AST  22  /  ALT  14  /  AlkPhos  63  02-08    PTT - ( 08 Feb 2025 02:25 )  PTT:60.3 sec  Urinalysis Basic - ( 09 Feb 2025 07:25 )    Color: x / Appearance: x / SG: x / pH: x  Gluc: 106 mg/dL / Ketone: x  / Bili: x / Urobili: x   Blood: x / Protein: x / Nitrite: x   Leuk Esterase: x / RBC: x / WBC x   Sq Epi: x / Non Sq Epi: x / Bacteria: x      CAPILLARY BLOOD GLUCOSE      POCT Blood Glucose.: 163 mg/dL (09 Feb 2025 17:28)  POCT Blood Glucose.: 207 mg/dL (09 Feb 2025 12:16)  POCT Blood Glucose.: 129 mg/dL (09 Feb 2025 08:50)  POCT Blood Glucose.: 157 mg/dL (08 Feb 2025 21:22)        Urinalysis Basic - ( 09 Feb 2025 07:25 )    Color: x / Appearance: x / SG: x / pH: x  Gluc: 106 mg/dL / Ketone: x  / Bili: x / Urobili: x   Blood: x / Protein: x / Nitrite: x   Leuk Esterase: x / RBC: x / WBC x   Sq Epi: x / Non Sq Epi: x / Bacteria: x        MEDICATIONS  (STANDING):  ampicillin/sulbactam  IVPB 3 Gram(s) IV Intermittent every 12 hours  ampicillin/sulbactam  IVPB      aspirin enteric coated 81 milliGRAM(s) Oral daily  atorvastatin 40 milliGRAM(s) Oral at bedtime  benzocaine/menthol Lozenge 1 Lozenge Oral once  bisacodyl 5 milliGRAM(s) Oral every 12 hours  buMETAnide Infusion 1.5 mG/Hr (7.5 mL/Hr) IV Continuous <Continuous>  dextrose 5%. 1000 milliLiter(s) (100 mL/Hr) IV Continuous <Continuous>  dextrose 5%. 1000 milliLiter(s) (50 mL/Hr) IV Continuous <Continuous>  dextrose 50% Injectable 25 Gram(s) IV Push once  dextrose 50% Injectable 12.5 Gram(s) IV Push once  dextrose 50% Injectable 25 Gram(s) IV Push once  DOBUTamine Infusion 5 MICROgram(s)/kG/Min (10.8 mL/Hr) IV Continuous <Continuous>  glucagon  Injectable 1 milliGRAM(s) IntraMuscular once  insulin glargine Injectable (LANTUS) 18 Unit(s) SubCutaneous at bedtime  insulin lispro (ADMELOG) corrective regimen sliding scale   SubCutaneous three times a day before meals  insulin lispro (ADMELOG) corrective regimen sliding scale   SubCutaneous at bedtime  insulin lispro Injectable (ADMELOG) 2 Unit(s) SubCutaneous three times a day with meals  metolazone 5 milliGRAM(s) Oral daily  metoprolol succinate ER 25 milliGRAM(s) Oral daily  pantoprazole  Injectable 40 milliGRAM(s) IV Push daily  polyethylene glycol 3350 17 Gram(s) Oral daily  rivaroxaban 2.5 milliGRAM(s) Oral two times a day with meals  senna 2 Tablet(s) Oral at bedtime    MEDICATIONS  (PRN):  acetaminophen     Tablet .. 650 milliGRAM(s) Oral every 6 hours PRN Mild Pain (1 - 3)  dextrose Oral Gel 15 Gram(s) Oral once PRN Blood Glucose LESS THAN 70 milliGRAM(s)/deciliter  melatonin 3 milliGRAM(s) Oral at bedtime PRN Insomnia  ondansetron Injectable 4 milliGRAM(s) IV Push every 6 hours PRN Nausea and/or Vomiting  oxyCODONE    IR 5 milliGRAM(s) Oral every 4 hours PRN Severe Pain (7 - 10)      Care Discussed with Consultants/Other Providers [ ] YES  [ ] NO

## 2025-02-09 NOTE — PROGRESS NOTE ADULT - ASSESSMENT
64y Female with history of CHF presents as a transfer for LE CO2 angiogram. Nephrology consulted for elevated Scr.    1) JAMEL: likely due to ATN in setting of infection/hypotension. Scr improving. UA relatively bland. FeUrea low consistent with low flow state. Renal US unremarkable. Bladder scan on 2/3 negative.  gtt as per cardiology. Avoid nephrotoxins.    2) HTN: BP low normal. Metoprolol as per cardiology. Holding ARNI.    3) LE edema: Not secondary to nephrotic syndrome given subnephrotic range proteinuria. Pt with good urine UO on bumex gtt @ 1.5 mg/hour. Consider Zaroxolyn 5mg PO daily. TTE with moderately decreased LVSF. Monitor UO.    4) Hyperphosphatemia: Resolved. Continue with low phosphorus diet.         [facility] NEPHROLOGY  Raji Waterman M.D.  Mars Feliz D.O.  Kelley Parks M.D.  MD Nancy Kulkarni, MSN, ANP-C    Telephone: [phone]  Facsimile: [phone] [place] [address], [address]  [place] [address]

## 2025-02-09 NOTE — PROGRESS NOTE ADULT - SUBJECTIVE AND OBJECTIVE BOX
[facility] NEPHROLOGY- PROGRESS NOTE    64y Female with history of CHF presents as a transfer for LE CO2 angiogram. Nephrology consulted for elevated Scr.    REVIEW OF SYSTEMS:  Gen: no fevers  Cards: no chest pain  Resp: + dyspnea with exertion improving, + cough  GI: no nausea and vomiting, no diarrhea  Vascular: + LE edema    No Known Allergies      Hospital Medications: Medications reviewed      VITALS:  T(F): 98 (02-09-25 @ 10:18), Max: 98.6 (02-09-25 @ 05:13)  HR: 100 (02-09-25 @ 10:18)  BP: 104/69 (02-09-25 @ 10:18)  RR: 18 (02-09-25 @ 10:18)  SpO2: 93% (02-09-25 @ 10:18)  Wt(kg): --    02-08 @ 07:01  -  02-09 @ 07:00  --------------------------------------------------------  IN: 1260 mL / OUT: 1400 mL / NET: -140 mL    02-09 @ 07:01  -  02-09 @ 14:51  --------------------------------------------------------  IN: 0 mL / OUT: 800 mL / NET: -800 mL      PHYSICAL EXAM:    Gen: NAD, calm  Cards: RRR, +S1/S2, no M/G/R  Resp: bibasilar rales  GI: soft, NT/ND, NABS  Vascular: 2+ RLE edema > LLE edema      LABS:  02-09    140  |  96  |  44[H]  ----------------------------<  106[H]  3.5   |  28  |  2.07[H]    Ca    9.2      09 Feb 2025 07:25    TPro  8.2  /  Alb  4.5  /  TBili  0.6  /  DBili      /  AST  22  /  ALT  14  /  AlkPhos  63  02-08    Creatinine Trend: 2.07 <--, 2.16 <--, 2.27 <--, 2.40 <--, 2.56 <--, 2.82 <--, 2.98 <--, 3.31 <--                        9.3    12.14 )-----------( 283      ( 08 Feb 2025 06:58 )             26.8     Urine Studies:  Urinalysis Basic - ( 09 Feb 2025 07:25 )    Color:  / Appearance:  / SG:  / pH:   Gluc: 106 mg/dL / Ketone:   / Bili:  / Urobili:    Blood:  / Protein:  / Nitrite:    Leuk Esterase:  / RBC:  / WBC    Sq Epi:  / Non Sq Epi:  / Bacteria:

## 2025-02-10 ENCOUNTER — RESULT REVIEW (OUTPATIENT)
Age: 65
End: 2025-02-10

## 2025-02-10 LAB
ANION GAP SERPL CALC-SCNC: 18 MMOL/L — HIGH (ref 5–17)
BASOPHILS # BLD AUTO: 0.03 K/UL — SIGNIFICANT CHANGE UP (ref 0–0.2)
BASOPHILS NFR BLD AUTO: 0.3 % — SIGNIFICANT CHANGE UP (ref 0–2)
BUN SERPL-MCNC: 44 MG/DL — HIGH (ref 7–23)
CALCIUM SERPL-MCNC: 9.3 MG/DL — SIGNIFICANT CHANGE UP (ref 8.4–10.5)
CHLORIDE SERPL-SCNC: 94 MMOL/L — LOW (ref 96–108)
CO2 SERPL-SCNC: 26 MMOL/L — SIGNIFICANT CHANGE UP (ref 22–31)
CREAT SERPL-MCNC: 2.29 MG/DL — HIGH (ref 0.5–1.3)
EGFR: 23 ML/MIN/1.73M2 — LOW
EGFR: 23 ML/MIN/1.73M2 — LOW
EOSINOPHIL # BLD AUTO: 0.18 K/UL — SIGNIFICANT CHANGE UP (ref 0–0.5)
EOSINOPHIL NFR BLD AUTO: 1.6 % — SIGNIFICANT CHANGE UP (ref 0–6)
GLUCOSE BLDC GLUCOMTR-MCNC: 101 MG/DL — HIGH (ref 70–99)
GLUCOSE BLDC GLUCOMTR-MCNC: 109 MG/DL — HIGH (ref 70–99)
GLUCOSE BLDC GLUCOMTR-MCNC: 117 MG/DL — HIGH (ref 70–99)
GLUCOSE BLDC GLUCOMTR-MCNC: 134 MG/DL — HIGH (ref 70–99)
GLUCOSE BLDC GLUCOMTR-MCNC: 198 MG/DL — HIGH (ref 70–99)
GLUCOSE SERPL-MCNC: 89 MG/DL — SIGNIFICANT CHANGE UP (ref 70–99)
HCT VFR BLD CALC: 25.3 % — LOW (ref 34.5–45)
HGB BLD-MCNC: 8.6 G/DL — LOW (ref 11.5–15.5)
IMM GRANULOCYTES NFR BLD AUTO: 0.4 % — SIGNIFICANT CHANGE UP (ref 0–0.9)
LYMPHOCYTES # BLD AUTO: 18.1 % — SIGNIFICANT CHANGE UP (ref 13–44)
LYMPHOCYTES # BLD AUTO: 2.07 K/UL — SIGNIFICANT CHANGE UP (ref 1–3.3)
MCHC RBC-ENTMCNC: 24 PG — LOW (ref 27–34)
MCHC RBC-ENTMCNC: 34 G/DL — SIGNIFICANT CHANGE UP (ref 32–36)
MCV RBC AUTO: 70.5 FL — LOW (ref 80–100)
MONOCYTES # BLD AUTO: 0.59 K/UL — SIGNIFICANT CHANGE UP (ref 0–0.9)
MONOCYTES NFR BLD AUTO: 5.1 % — SIGNIFICANT CHANGE UP (ref 2–14)
NEUTROPHILS # BLD AUTO: 8.54 K/UL — HIGH (ref 1.8–7.4)
NEUTROPHILS NFR BLD AUTO: 74.5 % — SIGNIFICANT CHANGE UP (ref 43–77)
NRBC # BLD: 0 /100 WBCS — SIGNIFICANT CHANGE UP (ref 0–0)
NRBC BLD-RTO: 0 /100 WBCS — SIGNIFICANT CHANGE UP (ref 0–0)
PLATELET # BLD AUTO: 251 K/UL — SIGNIFICANT CHANGE UP (ref 150–400)
POTASSIUM SERPL-MCNC: 4.1 MMOL/L — SIGNIFICANT CHANGE UP (ref 3.5–5.3)
POTASSIUM SERPL-SCNC: 4.1 MMOL/L — SIGNIFICANT CHANGE UP (ref 3.5–5.3)
RBC # BLD: 3.59 M/UL — LOW (ref 3.8–5.2)
RBC # FLD: 16 % — HIGH (ref 10.3–14.5)
SODIUM SERPL-SCNC: 138 MMOL/L — SIGNIFICANT CHANGE UP (ref 135–145)
WBC # BLD: 11.46 K/UL — HIGH (ref 3.8–10.5)
WBC # FLD AUTO: 11.46 K/UL — HIGH (ref 3.8–10.5)

## 2025-02-10 PROCEDURE — 93970 EXTREMITY STUDY: CPT | Mod: 26

## 2025-02-10 PROCEDURE — 71045 X-RAY EXAM CHEST 1 VIEW: CPT | Mod: 26

## 2025-02-10 PROCEDURE — 99232 SBSQ HOSP IP/OBS MODERATE 35: CPT

## 2025-02-10 PROCEDURE — 93308 TTE F-UP OR LMTD: CPT | Mod: 26

## 2025-02-10 RX ORDER — BUMETANIDE 1 MG/1
2 TABLET ORAL
Qty: 20 | Refills: 0 | Status: DISCONTINUED | OUTPATIENT
Start: 2025-02-10 | End: 2025-02-11

## 2025-02-10 RX ORDER — ACETAMINOPHEN 500 MG/5ML
1000 LIQUID (ML) ORAL ONCE
Refills: 0 | Status: COMPLETED | OUTPATIENT
Start: 2025-02-10 | End: 2025-02-10

## 2025-02-10 RX ORDER — INSULIN GLARGINE-YFGN 100 [IU]/ML
15 INJECTION, SOLUTION SUBCUTANEOUS AT BEDTIME
Refills: 0 | Status: DISCONTINUED | OUTPATIENT
Start: 2025-02-10 | End: 2025-02-12

## 2025-02-10 RX ADMIN — Medication 2 TABLET(S): at 22:07

## 2025-02-10 RX ADMIN — Medication 81 MILLIGRAM(S): at 13:31

## 2025-02-10 RX ADMIN — INSULIN LISPRO 1: 100 INJECTION, SOLUTION INTRAVENOUS; SUBCUTANEOUS at 13:30

## 2025-02-10 RX ADMIN — BUMETANIDE 10 MG/HR: 1 TABLET ORAL at 07:36

## 2025-02-10 RX ADMIN — OXYCODONE HYDROCHLORIDE 5 MILLIGRAM(S): 30 TABLET ORAL at 18:34

## 2025-02-10 RX ADMIN — Medication 1000 MILLIGRAM(S): at 22:37

## 2025-02-10 RX ADMIN — OXYCODONE HYDROCHLORIDE 5 MILLIGRAM(S): 30 TABLET ORAL at 14:31

## 2025-02-10 RX ADMIN — AMPICILLIN SODIUM AND SULBACTAM SODIUM 200 GRAM(S): 1; .5 INJECTION, POWDER, FOR SOLUTION INTRAMUSCULAR; INTRAVENOUS at 11:01

## 2025-02-10 RX ADMIN — DOBUTAMINE 10.8 MICROGRAM(S)/KG/MIN: 250 INJECTION INTRAVENOUS at 18:01

## 2025-02-10 RX ADMIN — Medication 40 MILLIGRAM(S): at 13:32

## 2025-02-10 RX ADMIN — INSULIN LISPRO 2 UNIT(S): 100 INJECTION, SOLUTION INTRAVENOUS; SUBCUTANEOUS at 17:59

## 2025-02-10 RX ADMIN — BUMETANIDE 7.5 MG/HR: 1 TABLET ORAL at 07:03

## 2025-02-10 RX ADMIN — INSULIN GLARGINE-YFGN 15 UNIT(S): 100 INJECTION, SOLUTION SUBCUTANEOUS at 22:12

## 2025-02-10 RX ADMIN — Medication 5 MILLIGRAM(S): at 06:11

## 2025-02-10 RX ADMIN — OXYCODONE HYDROCHLORIDE 5 MILLIGRAM(S): 30 TABLET ORAL at 18:04

## 2025-02-10 RX ADMIN — BUMETANIDE 10 MG/HR: 1 TABLET ORAL at 15:57

## 2025-02-10 RX ADMIN — RIVAROXABAN 2.5 MILLIGRAM(S): 10 TABLET, FILM COATED ORAL at 08:27

## 2025-02-10 RX ADMIN — INSULIN LISPRO 2 UNIT(S): 100 INJECTION, SOLUTION INTRAVENOUS; SUBCUTANEOUS at 13:31

## 2025-02-10 RX ADMIN — ATORVASTATIN CALCIUM 40 MILLIGRAM(S): 80 TABLET, FILM COATED ORAL at 22:07

## 2025-02-10 RX ADMIN — Medication 5 MILLIGRAM(S): at 17:58

## 2025-02-10 RX ADMIN — RIVAROXABAN 2.5 MILLIGRAM(S): 10 TABLET, FILM COATED ORAL at 17:59

## 2025-02-10 RX ADMIN — INSULIN LISPRO 2 UNIT(S): 100 INJECTION, SOLUTION INTRAVENOUS; SUBCUTANEOUS at 08:27

## 2025-02-10 RX ADMIN — Medication 400 MILLIGRAM(S): at 22:07

## 2025-02-10 RX ADMIN — AMPICILLIN SODIUM AND SULBACTAM SODIUM 200 GRAM(S): 1; .5 INJECTION, POWDER, FOR SOLUTION INTRAMUSCULAR; INTRAVENOUS at 22:10

## 2025-02-10 RX ADMIN — OXYCODONE HYDROCHLORIDE 5 MILLIGRAM(S): 30 TABLET ORAL at 13:31

## 2025-02-10 NOTE — PROGRESS NOTE ADULT - ASSESSMENT
63y/o F w/h/o uncontrolled T2DM (A1C 11.6%) on Tresiba and CeQur insulin patch PTA. DM c/b PAD, retinopathy, CKD, CAD. Also h/oType 2 DM, HTN, HLD. Presented to OSH with worsening right leg pain and fluid secretion. Transferred to [facility] for CO2 angiogram. Found to have Low EF to 20% in [place]. Endocrine consulted for Type 2 DM management. Tolerating POs with BG variable depending on what pt eats. Also with FBG coming down so will decrease basal insulin to keep BG goal 100 to 180s. Noted no parameter on prandial BG > will follow for any further insulin needs.       Home regimen: Tresiba 40 units, CeQur insulin patch about 2-10 units TID with meals     Met with patient and reviewed the following:  -DM complications (macro and micro) Pt states she knows every thing about diabetes and doesn't need more education.  -A1c LEVEL: Pt aware of present A1C level.  Limited education given > pt declined

## 2025-02-10 NOTE — PROGRESS NOTE ADULT - PROBLEM SELECTOR PLAN 1
- Check BG TID AC and HS while on PO diet  - Decreased Lantus dose to 15u QHS  - Continue Admelog 2u TID AC for now (HOLD if NPO or eating <50% of meal)  - C/w low dose Admelog correctional scales TID AC and HS  Discharge Planning:   - Likely basal/bolus regimen given kidney function (doses TBD closer to d/c). Not a candidate for SGLT2 due to leg ulcers and also significantly decreased EGFR., can consider GLP1 in addition to Basal/bolus> will need close f/u with Optho due to h/o retimopathy.   Can send Rx for ozempic 0.25mg weekly x 4weeks and increase to 0.50mg, or mounjaro 2.5mg 2.5mg x4 weeks, then increase to 5.0mg weekly. (Patient denies medullary thyroid cancer, hx of pancreatitis or MEN2)  - Please make sure patient has DM management supplies (glucometer, lancets, strips, alcohol pads. pen needles)  -Patient should check BGs before meals and at bedtime.  -Contact PCP/endocrinology if BG is less than 70 X1, greater than  400 X1 or persistently greater than 200s   - Patient should check BG TID AC and HS at home. Can use CGM if pt wishes.  - Endocrine follow up: can f/u with Dr. Grace, Endocrinology.   - Needs Optho/ renal/cardiac /podiatry and vascular follow up

## 2025-02-10 NOTE — PROGRESS NOTE ADULT - SUBJECTIVE AND OBJECTIVE BOX
Name of Patient : TOMASZ ALBA  MRN: [id number]  Date of visit: 02-10-25 @ 14:34      Subjective: Patient seen and examined. No new events except as noted.   Patient seen earlier this AM. Sitting OOB TC. Offers no new complaints. Reports feeling better.   Dobutamine increased as per Cardio to 5     REVIEW OF SYSTEMS:    CONSTITUTIONAL: Generalized weakness   EYES/ENT: No visual changes;  No vertigo or throat pain   NECK: No pain or stiffness  RESPIRATORY: No cough, wheezing, hemoptysis; No shortness of breath  CARDIOVASCULAR: No chest pain or palpitations  GASTROINTESTINAL: No abdominal or epigastric pain. No nausea, vomiting   GENITOURINARY: No dysuria, frequency or hematuria  NEUROLOGICAL: No numbness or weakness  SKIN: + B/L LE Foot wounds, wrapped in dressing   All other review of systems is negative unless indicated above.    MEDICATIONS:  MEDICATIONS  (STANDING):  ampicillin/sulbactam  IVPB 3 Gram(s) IV Intermittent every 12 hours  ampicillin/sulbactam  IVPB      aspirin enteric coated 81 milliGRAM(s) Oral daily  atorvastatin 40 milliGRAM(s) Oral at bedtime  benzocaine/menthol Lozenge 1 Lozenge Oral once  bisacodyl 5 milliGRAM(s) Oral every 12 hours  buMETAnide Infusion 2 mG/Hr (10 mL/Hr) IV Continuous <Continuous>  dextrose 5%. 1000 milliLiter(s) (100 mL/Hr) IV Continuous <Continuous>  dextrose 5%. 1000 milliLiter(s) (50 mL/Hr) IV Continuous <Continuous>  dextrose 50% Injectable 25 Gram(s) IV Push once  dextrose 50% Injectable 12.5 Gram(s) IV Push once  dextrose 50% Injectable 25 Gram(s) IV Push once  DOBUTamine Infusion 5 MICROgram(s)/kG/Min (10.8 mL/Hr) IV Continuous <Continuous>  glucagon  Injectable 1 milliGRAM(s) IntraMuscular once  insulin glargine Injectable (LANTUS) 15 Unit(s) SubCutaneous at bedtime  insulin lispro (ADMELOG) corrective regimen sliding scale   SubCutaneous three times a day before meals  insulin lispro (ADMELOG) corrective regimen sliding scale   SubCutaneous at bedtime  insulin lispro Injectable (ADMELOG) 2 Unit(s) SubCutaneous three times a day with meals  metolazone 5 milliGRAM(s) Oral daily  metoprolol succinate ER 25 milliGRAM(s) Oral daily  pantoprazole  Injectable 40 milliGRAM(s) IV Push daily  polyethylene glycol 3350 17 Gram(s) Oral daily  rivaroxaban 2.5 milliGRAM(s) Oral two times a day with meals  senna 2 Tablet(s) Oral at bedtime      PHYSICAL EXAM:  T(C): 36.8 (02-10-25 @ 14:08), Max: 36.8 (02-09-25 @ 17:17)  HR: 112 (02-10-25 @ 14:08) (92 - 112)  BP: 102/60 (02-10-25 @ 14:08) (102/60 - 117/62)  RR: 18 (02-10-25 @ 14:08) (18 - 18)  SpO2: 93% (02-10-25 @ 14:08) (92% - 98%)  Wt(kg): --  I&O's Summary    09 Feb 2025 07:01  -  10 Feb 2025 07:00  --------------------------------------------------------  IN: 1168.4 mL / OUT: 1900 mL / NET: -731.6 mL    10 Feb 2025 07:01  -  10 Feb 2025 14:34  --------------------------------------------------------  IN: 124.8 mL / OUT: 400 mL / NET: -275.2 mL          Appearance: Awake, Sitting OOB TC   HEENT:  Eyes are open   Lymphatic: No lymphadenopathy grossly   Cardiovascular: Normal    Respiratory: normal effort , clear  Gastrointestinal:  Soft, Non-tender  Skin: No rashes,  warm to touch  Psychiatry:  Mood & affect appropriate  Musculoskeletal: B/L LE Foot wounds; Wrapped in dressing; + Edema          02-09-25 @ 07:01  -  02-10-25 @ 07:00  --------------------------------------------------------  IN: 1168.4 mL / OUT: 1900 mL / NET: -731.6 mL    02-10-25 @ 07:01  -  02-10-25 @ 14:34  --------------------------------------------------------  IN: 124.8 mL / OUT: 400 mL / NET: -275.2 mL                                8.6    11.46 )-----------( 251      ( 10 Feb 2025 08:39 )             25.3               02-10    138  |  94[L]  |  44[H]  ----------------------------<  89  4.1   |  26  |  2.29[H]    Ca    9.3      10 Feb 2025 08:38                         Urinalysis Basic - ( 10 Feb 2025 08:38 )    Color: x / Appearance: x / SG: x / pH: x  Gluc: 89 mg/dL / Ketone: x  / Bili: x / Urobili: x   Blood: x / Protein: x / Nitrite: x   Leuk Esterase: x / RBC: x / WBC x   Sq Epi: x / Non Sq Epi: x / Bacteria: x         Name of Patient : TOMASZ ALBA  MRN: [id number]  Date of visit: 02-10-25 @ 14:34      Subjective: Patient seen and examined. No new events except as noted.   Patient seen earlier this AM. Sitting OOB TC. Offers no new complaints. Reports feeling better.   Dobutamine increased as per Cardio to 5.     REVIEW OF SYSTEMS:    CONSTITUTIONAL: Generalized weakness   EYES/ENT: No visual changes;  No vertigo or throat pain   NECK: No pain or stiffness  RESPIRATORY: No cough, wheezing, hemoptysis; No shortness of breath  CARDIOVASCULAR: No chest pain or palpitations  GASTROINTESTINAL: No abdominal or epigastric pain. No nausea, vomiting   GENITOURINARY: No dysuria, frequency or hematuria  NEUROLOGICAL: No numbness or weakness  SKIN: + B/L LE Foot wounds, wrapped in dressing   All other review of systems is negative unless indicated above.    MEDICATIONS:  MEDICATIONS  (STANDING):  ampicillin/sulbactam  IVPB 3 Gram(s) IV Intermittent every 12 hours  ampicillin/sulbactam  IVPB      aspirin enteric coated 81 milliGRAM(s) Oral daily  atorvastatin 40 milliGRAM(s) Oral at bedtime  benzocaine/menthol Lozenge 1 Lozenge Oral once  bisacodyl 5 milliGRAM(s) Oral every 12 hours  buMETAnide Infusion 2 mG/Hr (10 mL/Hr) IV Continuous <Continuous>  dextrose 5%. 1000 milliLiter(s) (100 mL/Hr) IV Continuous <Continuous>  dextrose 5%. 1000 milliLiter(s) (50 mL/Hr) IV Continuous <Continuous>  dextrose 50% Injectable 25 Gram(s) IV Push once  dextrose 50% Injectable 12.5 Gram(s) IV Push once  dextrose 50% Injectable 25 Gram(s) IV Push once  DOBUTamine Infusion 5 MICROgram(s)/kG/Min (10.8 mL/Hr) IV Continuous <Continuous>  glucagon  Injectable 1 milliGRAM(s) IntraMuscular once  insulin glargine Injectable (LANTUS) 15 Unit(s) SubCutaneous at bedtime  insulin lispro (ADMELOG) corrective regimen sliding scale   SubCutaneous three times a day before meals  insulin lispro (ADMELOG) corrective regimen sliding scale   SubCutaneous at bedtime  insulin lispro Injectable (ADMELOG) 2 Unit(s) SubCutaneous three times a day with meals  metolazone 5 milliGRAM(s) Oral daily  metoprolol succinate ER 25 milliGRAM(s) Oral daily  pantoprazole  Injectable 40 milliGRAM(s) IV Push daily  polyethylene glycol 3350 17 Gram(s) Oral daily  rivaroxaban 2.5 milliGRAM(s) Oral two times a day with meals  senna 2 Tablet(s) Oral at bedtime      PHYSICAL EXAM:  T(C): 36.8 (02-10-25 @ 14:08), Max: 36.8 (02-09-25 @ 17:17)  HR: 112 (02-10-25 @ 14:08) (92 - 112)  BP: 102/60 (02-10-25 @ 14:08) (102/60 - 117/62)  RR: 18 (02-10-25 @ 14:08) (18 - 18)  SpO2: 93% (02-10-25 @ 14:08) (92% - 98%)  Wt(kg): --  I&O's Summary    09 Feb 2025 07:01  -  10 Feb 2025 07:00  --------------------------------------------------------  IN: 1168.4 mL / OUT: 1900 mL / NET: -731.6 mL    10 Feb 2025 07:01  -  10 Feb 2025 14:34  --------------------------------------------------------  IN: 124.8 mL / OUT: 400 mL / NET: -275.2 mL          Appearance: Awake, Sitting OOB TC   HEENT:  Eyes are open   Lymphatic: No lymphadenopathy grossly   Cardiovascular: Normal    Respiratory: normal effort , clear  Gastrointestinal:  Soft, Non-tender  Skin: No rashes,  warm to touch  Psychiatry:  Mood & affect appropriate  Musculoskeletal: B/L LE Foot wounds; Wrapped in dressing; + Edema          02-09-25 @ 07:01  -  02-10-25 @ 07:00  --------------------------------------------------------  IN: 1168.4 mL / OUT: 1900 mL / NET: -731.6 mL    02-10-25 @ 07:01  -  02-10-25 @ 14:34  --------------------------------------------------------  IN: 124.8 mL / OUT: 400 mL / NET: -275.2 mL                                8.6    11.46 )-----------( 251      ( 10 Feb 2025 08:39 )             25.3               02-10    138  |  94[L]  |  44[H]  ----------------------------<  89  4.1   |  26  |  2.29[H]    Ca    9.3      10 Feb 2025 08:38              Urinalysis Basic - ( 10 Feb 2025 08:38 )    Color: x / Appearance: x / SG: x / pH: x  Gluc: 89 mg/dL / Ketone: x  / Bili: x / Urobili: x   Blood: x / Protein: x / Nitrite: x   Leuk Esterase: x / RBC: x / WBC x   Sq Epi: x / Non Sq Epi: x / Bacteria: x

## 2025-02-10 NOTE — PROGRESS NOTE ADULT - SUBJECTIVE AND OBJECTIVE BOX
MR#[id number]  PATIENT NAME:COLE ALBA    DATE OF SERVICE: 02-10-25 @ 07:09  Patient was seen and examined by Yordy Gilmore MD on    02-10-25 @ 07:09 .  Interim events noted.Consultant notes ,Labs,Telemetry reviewed by me       HOSPITAL COURSE: HPI:  64F, hx of HF (last TTE on 1/16/25 EF 30-35%, G2DD), DM, diabetic foot ulcer with a recent admission at [facility] for CHF exacerbation presented as a transfer for worsening R. leg pain and fluid secretion, On non-invasive imaging demonstrating severe depletion of RLE blood flow beyond the popliteal vessel at knee and similar findings in L. Was transferred to [facility] for CO2 angiogram with Dr. Baltazar. (29 Jan 2025 20:05)    Transferred from [facility] on previous admission: LVSF moderately decreased w/EF 30-35%. Moderate G2DD. Mild MR. Ischemic evaluation was recommended for which patient undergone stress test which revealed a small-sized moderate defect in the apical wall that is predominantly fixed suggestive of an infarction with minimal marcus-infarct ischemia. 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 Left leg Arterial Duplex: Slightly tardus parvus waveform of the left popliteal artery is noted, for which underlying disease cannot be excluded. Posterior tibial artery waveform is nonpulsatile. Anterior tibial artery tardus parvus flow is noted. Patient started on Heparin and Dobutamine drip with transfer to [facility] for further management.        INTERIM EVENTS:Patient seen at bedside ,interim events noted.  Awake alert remains on Bumex gtt and  gtt at 2.5mcg CO2 Angiogram postponed to next week-she is still orthopneac  02/08-on  5mcg and Bumex gtt   02/10-OOB still orthopneac [id number] LE weakness and swelling    PMH -reviewed admission note, no change since admission  HEART FAILURE: Acute[x ]Chronic[x ] Systolic[x ] Diastolic[ ] Combined Systolic and Diastolic[ ]  CAD[ ] CABG[ ] PCI[ ]  DEVICES[ ] PPM[ ] ICD[ ] ILR[ ]  ATRIAL FIBRILLATION[ ] Paroxysmal[ ] Permanent[ ] CHADS2-[  ]  JAMEL[ ] CKD1[ ] CKD2[ ] CKD3[ ] CKD4[ ] ESRD[ ]  COPD[ ] HTN[ ]   DM[ ] Type1[ ] Type 2[ ]   CVA[ ] Paresis[ ]    AMBULATION: Assisted[ ] Cane/walker[ ] Independent[ x]    MEDICATIONS  (STANDING):  ampicillin/sulbactam  IVPB 3 Gram(s) IV Intermittent every 12 hours  ampicillin/sulbactam  IVPB      aspirin enteric coated 81 milliGRAM(s) Oral daily  atorvastatin 40 milliGRAM(s) Oral at bedtime  benzocaine/menthol Lozenge 1 Lozenge Oral once  bisacodyl 5 milliGRAM(s) Oral every 12 hours  buMETAnide Infusion 1.5 mG/Hr (7.5 mL/Hr) IV Continuous <Continuous>  dextrose 5%. 1000 milliLiter(s) (100 mL/Hr) IV Continuous <Continuous>  dextrose 5%. 1000 milliLiter(s) (50 mL/Hr) IV Continuous <Continuous>  dextrose 50% Injectable 25 Gram(s) IV Push once  dextrose 50% Injectable 12.5 Gram(s) IV Push once  dextrose 50% Injectable 25 Gram(s) IV Push once  DOBUTamine Infusion 5 MICROgram(s)/kG/Min (10.8 mL/Hr) IV Continuous <Continuous>  glucagon  Injectable 1 milliGRAM(s) IntraMuscular once  insulin glargine Injectable (LANTUS) 18 Unit(s) SubCutaneous at bedtime  insulin lispro (ADMELOG) corrective regimen sliding scale   SubCutaneous three times a day before meals  insulin lispro (ADMELOG) corrective regimen sliding scale   SubCutaneous at bedtime  insulin lispro Injectable (ADMELOG) 2 Unit(s) SubCutaneous three times a day with meals  metolazone 5 milliGRAM(s) Oral daily  metoprolol succinate ER 25 milliGRAM(s) Oral daily  pantoprazole  Injectable 40 milliGRAM(s) IV Push daily  polyethylene glycol 3350 17 Gram(s) Oral daily  rivaroxaban 2.5 milliGRAM(s) Oral two times a day with meals  senna 2 Tablet(s) Oral at bedtime    MEDICATIONS  (PRN):  acetaminophen     Tablet .. 650 milliGRAM(s) Oral every 6 hours PRN Mild Pain (1 - 3)  dextrose Oral Gel 15 Gram(s) Oral once PRN Blood Glucose LESS THAN 70 milliGRAM(s)/deciliter  melatonin 3 milliGRAM(s) Oral at bedtime PRN Insomnia  ondansetron Injectable 4 milliGRAM(s) IV Push every 6 hours PRN Nausea and/or Vomiting  oxyCODONE    IR 5 milliGRAM(s) Oral every 4 hours PRN Severe Pain (7 - 10)            REVIEW OF SYSTEMS:  Constitutional: [ ] fever, [ ]weight loss,  [ x]fatigue [ ]weight gain  Eyes: [ ] visual changes  Respiratory: [ ]shortness of breath;  [ ] cough, [ ]wheezing, [ ]chills, [ ]hemoptysis  Cardiovascular: [ ] chest pain, [ ]palpitations, [ ]dizziness,  [xx ]leg swelling[ ]orthopnea[ ]PND  Gastrointestinal: [ ] abdominal pain, [ ]nausea, [ ]vomiting,  [ ]diarrhea [ ]Constipation [ ]Melena  Genitourinary: [ ] dysuria, [ ] hematuria [ ][place]  Neurologic: [ ] headaches [ ] tremors[ ]weakness [ ]Paralysis Right[ ] Left[ ]  Skin: [ ] itching, [ ]burning, [ ] rashes  Endocrine: [ ] heat or cold intolerance  Musculoskeletal: [ ] joint pain or swelling; [ ] muscle, back, or extremity pain  Psychiatric: [ ] depression, [ ]anxiety, [ ]mood swings, or [ ]difficulty sleeping  Hematologic: [ ] easy bruising, [ ] bleeding gums    [ ] All remaining systems negative except as per above.   [ ]Unable to obtain.  [x] No change in ROS since admission      Vital Signs Last 24 Hrs  T(C): 36.8 (10 Feb 2025 05:18), Max: 36.8 (09 Feb 2025 13:38)  T(F): 98.3 (10 Feb 2025 05:18), Max: 98.3 (09 Feb 2025 20:58)  HR: 103 (10 Feb 2025 05:18) (92 - 103)  BP: 109/68 (10 Feb 2025 05:18) (104/69 - 117/62)  RR: 18 (10 Feb 2025 05:18) (18 - 18)  SpO2: 92% (10 Feb 2025 05:18) (92% - 98%)    Parameters below as of 10 Feb 2025 05:18  Patient On (Oxygen Delivery Method): room air      I&O's Summary    09 Feb 2025 07:01  -  10 Feb 2025 07:00  --------------------------------------------------------  IN: 884.3 mL / OUT: 1500 mL / NET: -615.7 mL        PHYSICAL EXAM:  General: No acute distress BMI-24  HEENT: EOMI, PERRL  Neck: Supple, [ ] JVD  Lungs: Equal air entry bilaterally; [ ] rales [ ] wheezing [ ] rhonchi  Heart: Regular rate and rhythm; [x ] murmur   2/6 [ x] systolic [ ] diastolic [ ] radiation[ ] rubs [ ]  gallops  Abdomen: Nontender, bowel sounds present  Extremities: No clubbing, cyanosis, [xx ] edema [ x]Bilateral lower extremity venous stasis wounds to dermis, mild serous drainage, no acute signs of infection. Left foot hallux distal tuft well adhered eschar, no acute signs of infection.   Nervous system:  Alert & Oriented X3, no focal deficits  Psychiatric: Normal affect  Skin: No rashes or lesions    LABS:  02-09    140  |  96  |  44[H]  ----------------------------<  106[H]  3.5   |  28  |  2.07[H]    Ca    9.2      09 Feb 2025 07:25      Creatinine Trend: 2.07<--, 2.16<--, 2.27<--, 2.40<--, 2.56<--, 2.82<--      TTE W or WO Ultrasound Enhancing Agent (01.16.25 @ 12:45) >  CONCLUSIONS:      1. Left ventricular systolic function is moderately decreased with an ejection fraction visually estimated at 30 to 35 %.   2. There is moderate (grade 2) left ventricular diastolic dysfunction.   3. Mild mitral regurgitation.   4. Trace tricuspid regurgitation.   5. Trace pulmonic regurgitation.   6. Trace pericardial effusion.   7. Right pleural effusion noted.   8. No prior echocardiogram is available for comparison.        Nuclear Stress Test-Pharmacologic.. (01.24.25 @ 10:31) >  Conclusions:   1. Myocardial Perfusion: Abnormal.   2. Qualitative Perfusion:      - small-sized, moderate defect(s) in the apical wall that is predominantly fixed suggestive of an infarction with minimal marcus-infarct ischemia.   3. The post stress left ventricular EF is 25 %. The stress end diastolic volume is 118 ml.   4. Results D/Wcardiologist Dr. Gilmore at 18:31

## 2025-02-10 NOTE — PROGRESS NOTE ADULT - ASSESSMENT
65 YO F with PMHx of HFrEF 30-35 and grade 2 ddfxn (last TTE on 01/16/25), DM2, and diabetic foot ulcer. Recent admission at [facility] for ADHF. Patient now represents to OSH and ultimately to [facility] as a transfer for worsening right leg pain and fluid secretion. As per documentation, patient was reported to have severe depletion of RLE blood flow beyond the popliteal vessel at knee and similar findings in the left. Patient was transferred to [facility] for CO2 angiogram with Dr. Baltazar. Of note, patient also with reported visual disturbance for which patient was seen and evaluated by opthalmology. Internal Medicine has been consulted on Ms. Kirby's care for medical management.     # ADHF  - Recent admission at [facility] for ADHF  - TTE 1/16 with EF 30-35 with moderately reduced LVSF and grade 2 ddfxn, normal RVSF , trace TR/ VA, and trace pericardial effusion  - NST abnormal with small-sized, moderate defect in apical wall that is predominantly fixed suggestive of an infarction with minimal marcus-infarct ischemia. Post stress LVEF 25.  - CT Chest with small BL pleural effusions and small pericardial effusion.  - GDMT as per Cardio with Toprol XL 25 PO Qd   - In view of JAMEL further GDMT medications currently held   - Continue on dobutamine GTT, increased to 5mcg per cards,  monitor   - On Bumex mg GTT per renal team, monitor I and O, diuresis per Cardio/ Renal    - GDMT as per Cardio  - Monitor volume status   - Check daily weights    - Monitor on telemetry   - Cardio eval appreciated    # BL LE Edema likely in setting of ADHF  - Diuresis as per Cardio and Renal   - BL LE elevation and compression  - Check LE Duplex   - Monitor for now  - Podiatry and Vascular evals appreciated; F/u recs    # Pericardial effusion likely in setting ADHF  - TTE with trace pericardial effusion   - CT Chest with small pericardial effusion   - Denies chest pain, palpitations, chest tightness or discomfort, shortness of breath or dyspnea   - Repeat TTE in 1 month for further evaluation   - Diuresis per cardio/ renal.  - Monitor on telemetry  - Cardio following    # Pleural effusion likely in setting ADHF  - CT Chest with small BL pleural effusions  - On RA with no respiratory complaints at present  - Monitor O2 saturation  - Supplement to maintain > 90%   - Diuresis per cardio/ renal.     # JAMEL with Hyponatremia and Metabolic Acidosis   - As per OSH documentation, JAMEL was thought to be second to Unasyn/ Bumex / Entresto use and Hypotension   - US RENAL with no hydronephrosis  - CRE improving slightly with diuresis/ dobutamine and likely cardiorenal induced with low flow state in ADHF  - Avoid nephrotoxic agents  - Monitor Cr and daily BMP - up-trend in Cr. Monitor   - Avoid overcorrection of Na > 6-8 mEq in 24 hours   - Albumin per renal   - Renal eval appreciated    # Leukocytosis likely in setting of BL LE ulcers with pop occlusion   - BCx negative   - UCx with probable contamination   - On unasyn for ABX. Frequency increased to Q12 as per Renal   - Trend CBC, temp curve, VS and adjust as tolerated    # Foot ulcers with worsening RLE pain second to pop artery occlusion +/- diabetic ulcers   - RLE Arterial Duplex with popliteal artery occlusion   - LLE Arterial Duplex with underlying disease cannot be excluded   - Patient transferred to [facility] for CO2 angiogram, however given ADHF currently not medically optimized and high risk. Plan to continue on diuresis and dobutamine as above   - Was on Heparin GTT for now, now on Eliquis 2.5 PA   - On Unasyn for ABX   - Continue on Lipitor  - Monitor PLT and HH while on AC   - Vascular following  - Wound care called for dressing recs     # Tachycardia likely pain related   - On Toprol and improved  - Continue to monitor on tele   - Cardio eval appreciated    # Visual Disturbance  - CT Head w/ old infarcts  - On ASA and Statin   - Opthalmology eval appreciated    # Indigestion and Constipation   - PPI, miralax and senna continued  - MOnitor BMs    # Anemia likely mixed AOCD vs iron deficiency   - HH stable in 9s on heparin GGT   - Consider iron tabs when optimized   - Monitor HH   - Transfuse for Hgb < 8  - Maintain active T/S    # Diabetes Mellitus A1C 11.6   - Continue on lantus 18 with lispro 2 and ISS   - Endocrine following    # HLD and HLD  - Continue on lipitor and toprol  - Monitor BP    # DISPO TBD        Discussed with Attending and ACP       63 YO F with PMHx of HFrEF 30-35 and grade 2 ddfxn (last TTE on 01/16/25), DM2, and diabetic foot ulcer. Recent admission at [facility] for ADHF. Patient now represents to OSH and ultimately to [facility] as a transfer for worsening right leg pain and fluid secretion. As per documentation, patient was reported to have severe depletion of RLE blood flow beyond the popliteal vessel at knee and similar findings in the left. Patient was transferred to [facility] for CO2 angiogram with Dr. Baltazar. Of note, patient also with reported visual disturbance for which patient was seen and evaluated by opthalmology. Internal Medicine has been consulted on Ms. Kirby's care for medical management.     # ADHF  - Recent admission at [facility] for ADHF  - TTE 1/16 with EF 30-35 with moderately reduced LVSF and grade 2 ddfxn, normal RVSF , trace TR/ SD, and trace pericardial effusion  - NST abnormal with small-sized, moderate defect in apical wall that is predominantly fixed suggestive of an infarction with minimal marcus-infarct ischemia. Post stress LVEF 25.  - CT Chest with small BL pleural effusions and small pericardial effusion.  - GDMT as per Cardio with Toprol XL 25 PO Qd   - In view of JAMEL further GDMT medications currently held   - Continue on dobutamine GTT, increased to 5mcg per cards,  monitor   - On Bumex mg GTT per renal team, monitor I and O, diuresis per Cardio/ Renal    - GDMT as per Cardio  - F/u repeat TTE, performed, read is pending   - Check CXR in AM   - Monitor volume status   - Check daily weights    - Monitor on telemetry   - Cardio eval appreciated    # BL LE Edema likely in setting of ADHF  - Diuresis as per Cardio and Renal   - BL LE elevation and compression  - Check LE Duplex   - Monitor for now  - Podiatry and Vascular evals appreciated; F/u recs    # Pericardial effusion likely in setting ADHF  - TTE with trace pericardial effusion   - CT Chest with small pericardial effusion   - Denies chest pain, palpitations, chest tightness or discomfort, shortness of breath or dyspnea   - Repeat TTE in 1 month for further evaluation   - Diuresis per cardio/ renal.  - Monitor on telemetry  - Cardio following    # Pleural effusion likely in setting ADHF  - CT Chest with small BL pleural effusions  - On RA with no respiratory complaints at present  - Monitor O2 saturation  - Supplement to maintain > 90%   - Diuresis per cardio/ renal.     # JAMEL with Hyponatremia and Metabolic Acidosis   - As per OSH documentation, JAMEL was thought to be second to Unasyn/ Bumex / Entresto use and Hypotension   - US RENAL with no hydronephrosis  - CRE improving slightly with diuresis/ dobutamine and likely cardiorenal induced with low flow state in ADHF  - Avoid nephrotoxic agents  - Monitor Cr and daily BMP - up-trend in Cr. Monitor   - Avoid overcorrection of Na > 6-8 mEq in 24 hours   - Albumin per renal   - Renal eval appreciated    # Leukocytosis likely in setting of BL LE ulcers with pop occlusion   - BCx negative   - UCx with probable contamination   - On unasyn for ABX. Frequency increased to Q12 as per Renal   - Trend CBC, temp curve, VS and adjust as tolerated    # Foot ulcers with worsening RLE pain second to pop artery occlusion +/- diabetic ulcers   - RLE Arterial Duplex with popliteal artery occlusion   - LLE Arterial Duplex with underlying disease cannot be excluded   - Patient transferred to [facility] for CO2 angiogram, however given ADHF currently not medically optimized and high risk. Plan to continue on diuresis and dobutamine as above   - Was on Heparin GTT for now, now on Eliquis 2.5 PA   - On Unasyn for ABX   - Continue on Lipitor  - Monitor PLT and HH while on AC   - Vascular following  - Wound care called for dressing recs     # Tachycardia likely pain related   - On Toprol and improved  - Continue to monitor on tele   - Cardio eval appreciated    # Visual Disturbance  - CT Head w/ old infarcts  - On ASA and Statin   - Opthalmology eval appreciated    # Indigestion and Constipation   - PPI, miralax and senna continued  - MOnitor BMs    # Anemia likely mixed AOCD vs iron deficiency   - HH stable in 9s on heparin GGT   - Consider iron tabs when optimized   - Monitor HH   - Transfuse for Hgb < 8  - Maintain active T/S    # Diabetes Mellitus A1C 11.6   - Continue on lantus 18 with lispro 2 and ISS   - Endocrine following    # HLD and HLD  - Continue on lipitor and toprol  - Monitor BP    # DISPO TBD        Discussed with Attending and ACP

## 2025-02-10 NOTE — PROGRESS NOTE ADULT - ASSESSMENT
64y Female with history of CHF presents as a transfer for LE CO2 angiogram. Nephrology consulted for elevated Scr.    1) JAMEL: likely due to ATN in addition to CRS. Scr improved however remains above baseline. UA relatively bland. FeUrea low consistent with low flow state. Renal US unremarkable. Bladder scan on 2/3 negative.  gtt as per cardiology. Avoid nephrotoxins.    2) HTN: BP low normal. Metoprolol as per cardiology. Holding ARNI.    3) LE edema: Not secondary to nephrotic syndrome given subnephrotic range proteinuria. Continue with bumex gtt @ 2 mg/hour and metolazone 5 mg PO daily. Check standing weights and will consider HF consult if UO suboptimal. TTE with severely decreased LVSF. Monitor UO.    4) Hyperphosphatemia: Resolved. Continue with low phosphorus diet.         [facility] NEPHROLOGY  Raji Waterman M.D.  Mars Feliz D.O.  Kelley Parks M.D.  MD Nancy Kulkarni, MSN, ANP-C    Telephone: [phone]  Facsimile: [phone] [place] [address], [address]  [place] [address]

## 2025-02-10 NOTE — PROGRESS NOTE ADULT - ASSESSMENT
64F, hx of CHF (last TTE on 1/16/25 EF 30-35%, G2DD), DM, diabetic foot ulcer with a recent admission at [facility] for CHF exacerbation 1/14-1/17 p/w worsening R. leg pain and fluid secretion, was meeting sepsis criteria with podiatry having low suspicion for right cellulitis and admitted for continued management of HF exacerbation and needing ischemic eval.     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  -Left leg Arterial Duplex: Slightly tardus parvus waveform of the left popliteal artery is noted, for which underlying disease cannot be excluded. Posterior tibial artery waveform is nonpulsatile. Anterior tibial artery tardus parvus flow is noted.   Transferred to [facility] for CO2 angiogram as per vascular surgery    #  PAD Wound of lower extremity.   ·  Plan: Podiatry following, b/l serous bullae, no concerns for infection  Found to have RLE coolness  -Right Leg Arterial Duplex: Popliteal artery is occluded with negligible flow of right trifurcation arteries.  -Left leg Arterial Duplex: Slightly tardus parvus waveform of the left popliteal artery is noted, for which underlying disease cannot be excluded. Posterior tibial artery waveform is nonpulsatile. Anterior tibial artery tardus parvus flow is noted.  -Started on heparin gtt and dobutamine gtt -Will transfer to [facility] for CO2 angiogram as per vascular surgery as not candidate for CTA due to worsening SCr  -Keep compression dressing  -LE elevation above heart level at rest.  -Transferred to [facility] for CO2 angiogram   - Overall this patient is at   intermediate  risk (for cardiac death, nonfatal myocardial infarction, and nonfatal cardiac arrest perioperatively for this intermediate  risk procedure).   No cardiac contraindications for CO2 vangiogram  There  are  no further recommendation for risk stratifying imaging/stress testing prior to planned surgery  Plan for outpatient CO2 angiogram as per Vascular  Seen by Podiatry-No acute pod intervention, local wound care only- Pod stable for discharge     # HFrEF (congestive heart failure).   ·  Plan: Hx of HF, previous admission in January, on home Lasix 40 qD, Metoprolol Succ 25 qD. Not on Entresto due to insurance issues  Last TTE: LVSF moderately decreased w/ EF 30-35 %. Moderate G2DD. Mild MR  Stress test: small-sized, moderate defect(s) in the apical wall that is predominantly fixed suggestive of an infarction with minimal marcus-infarct ischemia  Ischemic cardiomyopathy  GDMT on hold 2/2 JAMEL  Continue Dobutamine and Bumex gtt at present  Still fluid overloaded increase  5mcg  Will continue Inotrope assisted diuresis and transition to GDMT tomorrow  Patient on Heparin gtt change to Xarelto 2.5mg BID for PAD      # JAMEL  Creatinine Trend:2.07<--2.16<-- 2.27<--2.56<--, 2.82<--, 2.98<--, 3.31<--, 2.65<--, 3.90<-- 1.17<--  Cardiorenal

## 2025-02-10 NOTE — PROGRESS NOTE ADULT - SUBJECTIVE AND OBJECTIVE BOX
DIABETES FOLLOW UP NOTE: Saw pt earlier today    Chief Complaint: Endocrine consult requested for management of DM    INTERVAL HX: Pt stable, reports tolerating POs with BG levels low 100s to low 200s in the last 24 hours. Noted FBG 89 per BMP and 101 per POC. Nohypoglycemia. Reports feeling better. Denies any pain/sob/n/v/d      Review of Systems:  General: As above  Cardiovascular: No chest pain, palpitations  Respiratory: No SOB, no cough  GI: No nausea, vomiting, abdominal pain  Endocrine: No polyuria, polydipsia or S&Sx of hypoglycemia    Allergies    No Known Allergies    Intolerances    Augmentin (Stomach Upset; Vomiting; Nausea)    MEDICATIONS:  ampicillin/sulbactam  IVPB 3 Gram(s) IV Intermittent every 12 hours  atorvastatin 40 milliGRAM(s) Oral at bedtime  insulin glargine Injectable (LANTUS) 15 Unit(s) SubCutaneous at bedtime  insulin lispro (ADMELOG) corrective regimen sliding scale   SubCutaneous three times a day before meals  insulin lispro (ADMELOG) corrective regimen sliding scale   SubCutaneous at bedtime  insulin lispro Injectable (ADMELOG) 2 Unit(s) SubCutaneous three times a day with meals      PHYSICAL EXAM:  VITALS: T(C): 36.8 (02-10-25 @ 14:08)  T(F): 98.2 (02-10-25 @ 14:08), Max: 98.3 (02-09-25 @ 20:58)  HR: 112 (02-10-25 @ 14:08) (92 - 112)  BP: 102/60 (02-10-25 @ 14:08) (102/60 - 117/62)  RR:  (18 - 18)  SpO2:  (92% - 98%)  Wt(kg): --  GENERAL: Female sitting in chair in NAD  Abdomen: Soft, nontender, non distended, central adiposity  Extremities: Warm, mild edema in LEs with chronic vascular changes in LEs> darker skin discoloration. B/l foot dressings D&I   NEURO: A&O X3    LABS:  POCT Blood Glucose.: 198 mg/dL (02-10-25 @ 13:29)  POCT Blood Glucose.: 101 mg/dL (02-10-25 @ 08:23)  POCT Blood Glucose.: 138 mg/dL (02-09-25 @ 21:18)  POCT Blood Glucose.: 163 mg/dL (02-09-25 @ 17:28)  POCT Blood Glucose.: 207 mg/dL (02-09-25 @ 12:16)  POCT Blood Glucose.: 129 mg/dL (02-09-25 @ 08:50)  POCT Blood Glucose.: 157 mg/dL (02-08-25 @ 21:22)  POCT Blood Glucose.: 115 mg/dL (02-08-25 @ 17:15)  POCT Blood Glucose.: 94 mg/dL (02-08-25 @ 12:17)  POCT Blood Glucose.: 112 mg/dL (02-08-25 @ 08:30)  POCT Blood Glucose.: 128 mg/dL (02-07-25 @ 21:11)  POCT Blood Glucose.: 78 mg/dL (02-07-25 @ 18:14)  POCT Blood Glucose.: 76 mg/dL (02-07-25 @ 17:40)                            8.6    11.46 )-----------( 251      ( 10 Feb 2025 08:39 )             25.3       02-10    138  |  94[L]  |  44[H]  ----------------------------<  89  4.1   |  26  |  2.29[H]    eGFR: 23[L]    Ca    9.3      02-10    TPro  8.2  /  Alb  4.5  /  TBili  0.6  /  DBili  x   /  AST  22  /  ALT  14  /  AlkPhos  63  02-08      Thyroid Function Tests:  01-24 @ 05:40 TSH 5.34 FreeT4 -- T3 -- Anti TPO -- Anti Thyroglobulin Ab -- TSI --      A1C with Estimated Average Glucose Result: 11.6 % (01-24-25 @ 05:40)  A1C with Estimated Average Glucose Result: >15.5 % (12-13-24 @ 06:49)      Estimated Average Glucose: 286 mg/dL (01-24-25 @ 05:40)  Estimated Average Glucose: >398 mg/dL (12-13-24 @ 06:49)        01-24 Chol 122 Direct LDL -- LDL calculated 66 HDL 39[L] Trig 89

## 2025-02-10 NOTE — PROGRESS NOTE ADULT - SUBJECTIVE AND OBJECTIVE BOX
[facility] NEPHROLOGY- PROGRESS NOTE    64y Female with history of CHF presents as a transfer for LE CO2 angiogram. Nephrology consulted for elevated Scr.    REVIEW OF SYSTEMS:  Gen: no fevers  Cards: no chest pain  Resp: + dyspnea with exertion improving, + cough  GI: no nausea and vomiting, no diarrhea  Vascular: + LE edema    No Known Allergies      Hospital Medications: Medications reviewed        VITALS:  T(F): 98.4 (02-10-25 @ 17:39), Max: 98.4 (02-10-25 @ 17:39)  HR: 107 (02-10-25 @ 17:39)  BP: 114/73 (02-10-25 @ 17:39)  RR: 18 (02-10-25 @ 17:39)  SpO2: 92% (02-10-25 @ 17:39)  Wt(kg): --    02-09 @ 07:01  -  02-10 @ 07:00  --------------------------------------------------------  IN: 1168.4 mL / OUT: 1900 mL / NET: -731.6 mL    02-10 @ 07:01  -  02-10 @ 18:49  --------------------------------------------------------  IN: 208 mL / OUT: 800 mL / NET: -592 mL        PHYSICAL EXAM:    Gen: NAD, calm  Cards: RRR, +S1/S2, no M/G/R  Resp: bibasilar rales  GI: soft, NT/ND, NABS  Vascular: 2+ RLE edema > LLE edema        LABS:  02-10    138  |  94[L]  |  44[H]  ----------------------------<  89  4.1   |  26  |  2.29[H]    Ca    9.3      10 Feb 2025 08:38      Creatinine Trend: 2.29 <--, 2.07 <--, 2.16 <--, 2.27 <--, 2.40 <--, 2.56 <--, 2.82 <--, 2.98 <--                        8.6    11.46 )-----------( 251      ( 10 Feb 2025 08:39 )             25.3     Urine Studies:  Urinalysis Basic - ( 10 Feb 2025 08:38 )    Color:  / Appearance:  / SG:  / pH:   Gluc: 89 mg/dL / Ketone:   / Bili:  / Urobili:    Blood:  / Protein:  / Nitrite:    Leuk Esterase:  / RBC:  / WBC    Sq Epi:  / Non Sq Epi:  / Bacteria:

## 2025-02-10 NOTE — CHART NOTE - NSCHARTNOTEFT_GEN_A_CORE
ACP discussed pt case with EP team for consult to consider AICD with new ejection fraction 20% on TTE. Pt currently on bumex gtt and dobutamine gtt, plan to transition back to GDMT tomorrow per cardiology. On home Lasix 40 qD, Metoprolol Succ 25 qD. Not on Entresto due to insurance issues.  Per EP, pt needs to be on GDMT for 3 months before AICD placement and should be stabilized off of dobutamine gtt prior to EP evaluation. Re-consult when criteria are met.

## 2025-02-11 LAB
ALBUMIN SERPL ELPH-MCNC: 4.4 G/DL — SIGNIFICANT CHANGE UP (ref 3.3–5)
ALP SERPL-CCNC: 68 U/L — SIGNIFICANT CHANGE UP (ref 40–120)
ALT FLD-CCNC: 17 U/L — SIGNIFICANT CHANGE UP (ref 10–45)
ANION GAP SERPL CALC-SCNC: 15 MMOL/L — SIGNIFICANT CHANGE UP (ref 5–17)
ANION GAP SERPL CALC-SCNC: 16 MMOL/L — SIGNIFICANT CHANGE UP (ref 5–17)
AST SERPL-CCNC: 24 U/L — SIGNIFICANT CHANGE UP (ref 10–40)
BASOPHILS # BLD AUTO: 0.04 K/UL — SIGNIFICANT CHANGE UP (ref 0–0.2)
BASOPHILS NFR BLD AUTO: 0.4 % — SIGNIFICANT CHANGE UP (ref 0–2)
BILIRUB SERPL-MCNC: 0.8 MG/DL — SIGNIFICANT CHANGE UP (ref 0.2–1.2)
BUN SERPL-MCNC: 48 MG/DL — HIGH (ref 7–23)
BUN SERPL-MCNC: 53 MG/DL — HIGH (ref 7–23)
CALCIUM SERPL-MCNC: 9.3 MG/DL — SIGNIFICANT CHANGE UP (ref 8.4–10.5)
CALCIUM SERPL-MCNC: 9.3 MG/DL — SIGNIFICANT CHANGE UP (ref 8.4–10.5)
CHLORIDE SERPL-SCNC: 92 MMOL/L — LOW (ref 96–108)
CHLORIDE SERPL-SCNC: 93 MMOL/L — LOW (ref 96–108)
CO2 SERPL-SCNC: 29 MMOL/L — SIGNIFICANT CHANGE UP (ref 22–31)
CO2 SERPL-SCNC: 29 MMOL/L — SIGNIFICANT CHANGE UP (ref 22–31)
CREAT SERPL-MCNC: 2.42 MG/DL — HIGH (ref 0.5–1.3)
CREAT SERPL-MCNC: 2.49 MG/DL — HIGH (ref 0.5–1.3)
EGFR: 21 ML/MIN/1.73M2 — LOW
EGFR: 21 ML/MIN/1.73M2 — LOW
EGFR: 22 ML/MIN/1.73M2 — LOW
EGFR: 22 ML/MIN/1.73M2 — LOW
EOSINOPHIL # BLD AUTO: 0.29 K/UL — SIGNIFICANT CHANGE UP (ref 0–0.5)
EOSINOPHIL NFR BLD AUTO: 2.9 % — SIGNIFICANT CHANGE UP (ref 0–6)
GLUCOSE BLDC GLUCOMTR-MCNC: 106 MG/DL — HIGH (ref 70–99)
GLUCOSE BLDC GLUCOMTR-MCNC: 157 MG/DL — HIGH (ref 70–99)
GLUCOSE BLDC GLUCOMTR-MCNC: 72 MG/DL — SIGNIFICANT CHANGE UP (ref 70–99)
GLUCOSE BLDC GLUCOMTR-MCNC: 97 MG/DL — SIGNIFICANT CHANGE UP (ref 70–99)
GLUCOSE SERPL-MCNC: 81 MG/DL — SIGNIFICANT CHANGE UP (ref 70–99)
GLUCOSE SERPL-MCNC: 84 MG/DL — SIGNIFICANT CHANGE UP (ref 70–99)
HCT VFR BLD CALC: 24.5 % — LOW (ref 34.5–45)
HGB BLD-MCNC: 8.5 G/DL — LOW (ref 11.5–15.5)
IMM GRANULOCYTES NFR BLD AUTO: 0.2 % — SIGNIFICANT CHANGE UP (ref 0–0.9)
LACTATE SERPL-SCNC: 1.5 MMOL/L — SIGNIFICANT CHANGE UP (ref 0.5–2)
LYMPHOCYTES # BLD AUTO: 2.83 K/UL — SIGNIFICANT CHANGE UP (ref 1–3.3)
LYMPHOCYTES # BLD AUTO: 28.7 % — SIGNIFICANT CHANGE UP (ref 13–44)
MCHC RBC-ENTMCNC: 24.8 PG — LOW (ref 27–34)
MCHC RBC-ENTMCNC: 34.7 G/DL — SIGNIFICANT CHANGE UP (ref 32–36)
MCV RBC AUTO: 71.4 FL — LOW (ref 80–100)
MONOCYTES # BLD AUTO: 0.6 K/UL — SIGNIFICANT CHANGE UP (ref 0–0.9)
MONOCYTES NFR BLD AUTO: 6.1 % — SIGNIFICANT CHANGE UP (ref 2–14)
NEUTROPHILS # BLD AUTO: 6.08 K/UL — SIGNIFICANT CHANGE UP (ref 1.8–7.4)
NEUTROPHILS NFR BLD AUTO: 61.7 % — SIGNIFICANT CHANGE UP (ref 43–77)
NRBC # BLD: 0 /100 WBCS — SIGNIFICANT CHANGE UP (ref 0–0)
NRBC BLD-RTO: 0 /100 WBCS — SIGNIFICANT CHANGE UP (ref 0–0)
PLATELET # BLD AUTO: 290 K/UL — SIGNIFICANT CHANGE UP (ref 150–400)
POTASSIUM SERPL-MCNC: 3.1 MMOL/L — LOW (ref 3.5–5.3)
POTASSIUM SERPL-MCNC: 3.5 MMOL/L — SIGNIFICANT CHANGE UP (ref 3.5–5.3)
POTASSIUM SERPL-SCNC: 3.1 MMOL/L — LOW (ref 3.5–5.3)
POTASSIUM SERPL-SCNC: 3.5 MMOL/L — SIGNIFICANT CHANGE UP (ref 3.5–5.3)
PROT SERPL-MCNC: 8.4 G/DL — HIGH (ref 6–8.3)
RBC # BLD: 3.43 M/UL — LOW (ref 3.8–5.2)
RBC # FLD: 15.7 % — HIGH (ref 10.3–14.5)
SODIUM SERPL-SCNC: 137 MMOL/L — SIGNIFICANT CHANGE UP (ref 135–145)
SODIUM SERPL-SCNC: 137 MMOL/L — SIGNIFICANT CHANGE UP (ref 135–145)
WBC # BLD: 9.86 K/UL — SIGNIFICANT CHANGE UP (ref 3.8–10.5)
WBC # FLD AUTO: 9.86 K/UL — SIGNIFICANT CHANGE UP (ref 3.8–10.5)

## 2025-02-11 PROCEDURE — 71045 X-RAY EXAM CHEST 1 VIEW: CPT | Mod: 26

## 2025-02-11 PROCEDURE — 99222 1ST HOSP IP/OBS MODERATE 55: CPT

## 2025-02-11 PROCEDURE — 99232 SBSQ HOSP IP/OBS MODERATE 35: CPT

## 2025-02-11 RX ORDER — ACETAZOLAMIDE 250 MG/1
500 TABLET ORAL ONCE
Refills: 0 | Status: COMPLETED | OUTPATIENT
Start: 2025-02-11 | End: 2025-02-11

## 2025-02-11 RX ORDER — BUMETANIDE 1 MG/1
3 TABLET ORAL
Qty: 20 | Refills: 0 | Status: DISCONTINUED | OUTPATIENT
Start: 2025-02-11 | End: 2025-02-17

## 2025-02-11 RX ORDER — MAGNESIUM SULFATE 500 MG/ML
2 SYRINGE (ML) INJECTION ONCE
Refills: 0 | Status: COMPLETED | OUTPATIENT
Start: 2025-02-11 | End: 2025-02-11

## 2025-02-11 RX ORDER — ACETAZOLAMIDE 250 MG/1
500 TABLET ORAL ONCE
Refills: 0 | Status: DISCONTINUED | OUTPATIENT
Start: 2025-02-11 | End: 2025-02-11

## 2025-02-11 RX ADMIN — Medication 40 MILLIGRAM(S): at 11:54

## 2025-02-11 RX ADMIN — INSULIN LISPRO 1: 100 INJECTION, SOLUTION INTRAVENOUS; SUBCUTANEOUS at 12:49

## 2025-02-11 RX ADMIN — OXYCODONE HYDROCHLORIDE 5 MILLIGRAM(S): 30 TABLET ORAL at 22:47

## 2025-02-11 RX ADMIN — BUMETANIDE 15 MG/HR: 1 TABLET ORAL at 15:04

## 2025-02-11 RX ADMIN — Medication 25 GRAM(S): at 08:26

## 2025-02-11 RX ADMIN — INSULIN LISPRO 2 UNIT(S): 100 INJECTION, SOLUTION INTRAVENOUS; SUBCUTANEOUS at 09:15

## 2025-02-11 RX ADMIN — AMPICILLIN SODIUM AND SULBACTAM SODIUM 200 GRAM(S): 1; .5 INJECTION, POWDER, FOR SOLUTION INTRAMUSCULAR; INTRAVENOUS at 21:54

## 2025-02-11 RX ADMIN — INSULIN LISPRO 2 UNIT(S): 100 INJECTION, SOLUTION INTRAVENOUS; SUBCUTANEOUS at 17:56

## 2025-02-11 RX ADMIN — ATORVASTATIN CALCIUM 40 MILLIGRAM(S): 80 TABLET, FILM COATED ORAL at 21:48

## 2025-02-11 RX ADMIN — OXYCODONE HYDROCHLORIDE 5 MILLIGRAM(S): 30 TABLET ORAL at 08:10

## 2025-02-11 RX ADMIN — BUMETANIDE 10 MG/HR: 1 TABLET ORAL at 02:14

## 2025-02-11 RX ADMIN — OXYCODONE HYDROCHLORIDE 5 MILLIGRAM(S): 30 TABLET ORAL at 21:47

## 2025-02-11 RX ADMIN — BUMETANIDE 15 MG/HR: 1 TABLET ORAL at 08:11

## 2025-02-11 RX ADMIN — ACETAZOLAMIDE 500 MILLIGRAM(S): 250 TABLET ORAL at 15:04

## 2025-02-11 RX ADMIN — OXYCODONE HYDROCHLORIDE 5 MILLIGRAM(S): 30 TABLET ORAL at 15:04

## 2025-02-11 RX ADMIN — Medication 5 MILLIGRAM(S): at 17:57

## 2025-02-11 RX ADMIN — AMPICILLIN SODIUM AND SULBACTAM SODIUM 200 GRAM(S): 1; .5 INJECTION, POWDER, FOR SOLUTION INTRAMUSCULAR; INTRAVENOUS at 11:53

## 2025-02-11 RX ADMIN — BUMETANIDE 15 MG/HR: 1 TABLET ORAL at 11:54

## 2025-02-11 RX ADMIN — BUMETANIDE 15 MG/HR: 1 TABLET ORAL at 21:56

## 2025-02-11 RX ADMIN — INSULIN LISPRO 2 UNIT(S): 100 INJECTION, SOLUTION INTRAVENOUS; SUBCUTANEOUS at 12:49

## 2025-02-11 RX ADMIN — OXYCODONE HYDROCHLORIDE 5 MILLIGRAM(S): 30 TABLET ORAL at 16:04

## 2025-02-11 RX ADMIN — Medication 2 TABLET(S): at 21:48

## 2025-02-11 RX ADMIN — Medication 5 MILLIGRAM(S): at 06:12

## 2025-02-11 RX ADMIN — ACETAZOLAMIDE 500 MILLIGRAM(S): 250 TABLET ORAL at 21:47

## 2025-02-11 RX ADMIN — INSULIN GLARGINE-YFGN 15 UNIT(S): 100 INJECTION, SOLUTION SUBCUTANEOUS at 21:49

## 2025-02-11 RX ADMIN — Medication 81 MILLIGRAM(S): at 11:53

## 2025-02-11 RX ADMIN — Medication 40 MILLIEQUIVALENT(S): at 08:11

## 2025-02-11 RX ADMIN — OXYCODONE HYDROCHLORIDE 5 MILLIGRAM(S): 30 TABLET ORAL at 09:10

## 2025-02-11 RX ADMIN — Medication 40 MILLIEQUIVALENT(S): at 11:54

## 2025-02-11 NOTE — PROGRESS NOTE ADULT - ASSESSMENT
65 YO F with PMHx of HFrEF 30-35 and grade 2 ddfxn (last TTE on 01/16/25), DM2, and diabetic foot ulcer. Recent admission at [facility] for ADHF. Patient now represents to OSH and ultimately to [facility] as a transfer for worsening right leg pain and fluid secretion. As per documentation, patient was reported to have severe depletion of RLE blood flow beyond the popliteal vessel at knee and similar findings in the left. Patient was transferred to [facility] for CO2 angiogram with Dr. Baltazar. Of note, patient also with reported visual disturbance for which patient was seen and evaluated by opthalmology. Internal Medicine has been consulted on Ms. Kirby's care for medical management.     # ADHF  - Recent admission at [facility] for ADHF  - TTE 1/16 with EF 30-35 with moderately reduced LVSF and grade 2 ddfxn, normal RVSF , trace TR/ WA, and trace pericardial effusion  - NST abnormal with small-sized, moderate defect in apical wall that is predominantly fixed suggestive of an infarction with minimal marcus-infarct ischemia. Post stress LVEF 25.  - CT Chest with small BL pleural effusions and small pericardial effusion.  - GDMT as per Cardio with Toprol XL 25 PO Qd   - In view of JAMEL further GDMT medications currently held   - Continue on dobutamine GTT, increased to 5mcg per cards,  monitor   - On Bumex mg GTT per renal team, monitor I and O, diuresis per Cardio/ Renal    - GDMT as per Cardio  - F/u repeat TTE, performed, read is pending   - Serial CXR   - Monitor volume status   - Check daily weights    - Monitor on telemetry   - Cardio eval appreciated  - HF eval called for HF management     # BL LE Edema likely in setting of ADHF  - Diuresis as per Cardio and Renal   - BL LE elevation and compression  - Check LE Duplex   - Monitor for now  - Podiatry and Vascular evals appreciated; F/u recs    # Pericardial effusion likely in setting ADHF  - TTE with trace pericardial effusion   - CT Chest with small pericardial effusion   - Denies chest pain, palpitations, chest tightness or discomfort, shortness of breath or dyspnea   - Repeat TTE in 1 month for further evaluation   - Diuresis per cardio/ renal.  - Monitor on telemetry  - Cardio following    # Pleural effusion likely in setting ADHF  - CT Chest with small BL pleural effusions  - On RA with no respiratory complaints at present  - Monitor O2 saturation  - Supplement to maintain > 90%   - Diuresis per cardio/ renal.     # JAMEL with Hyponatremia and Metabolic Acidosis   - As per OSH documentation, JAMEL was thought to be second to Unasyn/ Bumex / Entresto use and Hypotension   - US RENAL with no hydronephrosis  - CRE improving slightly with diuresis/ dobutamine and likely cardiorenal induced with low flow state in ADHF  - Avoid nephrotoxic agents  - Monitor Cr and daily BMP - up-trend in Cr. Monitor   - Avoid overcorrection of Na > 6-8 mEq in 24 hours   - Albumin per renal   - Renal eval appreciated    # Leukocytosis likely in setting of BL LE ulcers with pop occlusion   - BCx negative   - UCx with probable contamination   - On unasyn for ABX. Frequency increased to Q12 as per Renal   - Trend CBC, temp curve, VS and adjust as tolerated    # Foot ulcers with worsening RLE pain second to pop artery occlusion +/- diabetic ulcers   - RLE Arterial Duplex with popliteal artery occlusion   - LLE Arterial Duplex with underlying disease cannot be excluded   - Patient transferred to [facility] for CO2 angiogram, however given ADHF currently not medically optimized and high risk. Plan to continue on diuresis and dobutamine as above   - Was on Heparin GTT for now, now on Eliquis 2.5 PA   - On Unasyn for ABX   - Continue on Lipitor  - Monitor PLT and HH while on AC   - Vascular following  - Wound care called for dressing recs     # Tachycardia likely pain related   - On Toprol and improved  - Continue to monitor on tele   - Cardio eval appreciated    # Visual Disturbance  - CT Head w/ old infarcts  - On ASA and Statin   - Opthalmology eval appreciated    # Indigestion and Constipation   - PPI, miralax and senna continued  - MOnitor BMs    # Anemia likely mixed AOCD vs iron deficiency   - HH stable in 9s on heparin GGT   - Consider iron tabs when optimized   - Monitor HH   - Transfuse for Hgb < 8  - Maintain active T/S    # Diabetes Mellitus A1C 11.6   - Continue on lantus 18 with lispro 2 and ISS   - Endocrine following    # HLD and HLD  - Continue on lipitor and toprol  - Monitor BP    # DISPO TBD        Patient seen and examined by me. patient care and plan discussed and reviewed with PA. Plan as outlined above edited by me to reflect our discussion. Advanced care planning/advanced directives discussed with patient/family. DNR status including forceful chest compressions to attempt to restart the heart, ventilator support/artificial breathing, electric shock, artificial nutrition, health care proxy, Molst form all discussed with pt. Sixty seven minutes of total time dedicated to this patient visit today including preparing to see the patient (eg. review of tests), obtaining and/or reviewing separately obtained history, obtaining/reviewing vitals, performing a medically appropriate examination and/or evaluation, counseling and educating the patient/family/caregiver, reviewing previous notes and test results, and procedures, communicating with other health professionals (when not separately reported), and documenting clinical information in the electronic health record.

## 2025-02-11 NOTE — PHYSICAL THERAPY INITIAL EVALUATION ADULT - PERTINENT HX OF CURRENT PROBLEM, REHAB EVAL
64F, hx of CHF (last TTE on 1/16/25 EF 30-35%, G2DD), DM, diabetic foot ulcer with a recent admission at [facility] for CHF exacerbation presented as a transfer for worsening R. leg pain and fluid secretion, On non-invasive imaging demonstrating severe depletion of RLE blood flow beyond the popliteal vessel at knee and similar findings in L. Was transferred to [facility] for CO2 angiogram with Dr. Baltazar (-)DVT B LE, CT head: (-)acute hem, US kidney/bladder:No hydronephrosis.

## 2025-02-11 NOTE — CONSULT NOTE ADULT - SUBJECTIVE AND OBJECTIVE BOX
Salinas Rushing MD  Cardiology Fellow  [phone]  All Cardiology service information can be found 24/7 on [email or web address], password: [email or web address]    Patient seen and evaluated at bedside    HPI:  64F, hx of HFrEF (previously 30-35%, now 20%), never had LHC, DM, diabetic foot ulcer, severe PAD b/l, presenting initially for vascular angiogram, found to be in ADHF with volume overload. Since admission as been on bumex gtt with dobutamine 5. No lactate or other signs of poor perfusion other than JAMEL which initially improved, slightly uptrending last few days. Patient persistently orthopneic, LE swelling. Patient amenable to LHC.     Cardiac testing:  TTE 2/10/25: EF 20%, reduced RVSF  TTE 1/16/25: EF 30-35%, grade 2 DD  NST 1/24/25: small moderate defect in apical wall that is predominantly fixed    Cardiac Meds: ASA, xarelto 2.5 BID, atorva 40    PMHx:   DM (diabetes mellitus)    HTN (hypertension)    Cataract of both eyes, unspecified cataract type    Diabetic foot ulcer    Congestive heart failure (CHF)    CAD (coronary artery disease)        PSHx:   No significant past surgical history    Cataract of both eyes, unspecified cataract type    History of cholecystectomy        Allergies:  Augmentin (Stomach Upset; Vomiting; Nausea)  No Known Allergies      Current Medications:   acetaminophen     Tablet .. 650 milliGRAM(s) Oral every 6 hours PRN  acetaZOLAMIDE Injectable 500 milliGRAM(s) IV Push once  ampicillin/sulbactam  IVPB 3 Gram(s) IV Intermittent every 12 hours  ampicillin/sulbactam  IVPB      aspirin enteric coated 81 milliGRAM(s) Oral daily  atorvastatin 40 milliGRAM(s) Oral at bedtime  benzocaine/menthol Lozenge 1 Lozenge Oral once  bisacodyl 5 milliGRAM(s) Oral every 12 hours  buMETAnide Infusion 3 mG/Hr IV Continuous <Continuous>  dextrose 5%. 1000 milliLiter(s) IV Continuous <Continuous>  dextrose 5%. 1000 milliLiter(s) IV Continuous <Continuous>  dextrose 50% Injectable 25 Gram(s) IV Push once  dextrose 50% Injectable 12.5 Gram(s) IV Push once  dextrose 50% Injectable 25 Gram(s) IV Push once  dextrose Oral Gel 15 Gram(s) Oral once PRN  glucagon  Injectable 1 milliGRAM(s) IntraMuscular once  insulin glargine Injectable (LANTUS) 15 Unit(s) SubCutaneous at bedtime  insulin lispro (ADMELOG) corrective regimen sliding scale   SubCutaneous three times a day before meals  insulin lispro (ADMELOG) corrective regimen sliding scale   SubCutaneous at bedtime  insulin lispro Injectable (ADMELOG) 2 Unit(s) SubCutaneous three times a day with meals  melatonin 3 milliGRAM(s) Oral at bedtime PRN  ondansetron Injectable 4 milliGRAM(s) IV Push every 6 hours PRN  oxyCODONE    IR 5 milliGRAM(s) Oral every 4 hours PRN  pantoprazole  Injectable 40 milliGRAM(s) IV Push daily  polyethylene glycol 3350 17 Gram(s) Oral daily  senna 2 Tablet(s) Oral at bedtime      FAMILY HISTORY:  Family history of CHF (congestive heart failure) (Mother)          REVIEW OF SYSTEMS:  CONSTITUTIONAL: No weakness, fevers or chills  EYES/ENT: No visual changes;  No dysphagia  NECK: No pain or stiffness  RESPIRATORY: No cough, wheezing, hemoptysis; No shortness of breath  CARDIOVASCULAR: No chest pain or palpitations; No lower extremity edema  GASTROINTESTINAL: No abdominal or epigastric pain. No nausea, vomiting, or hematemesis; No diarrhea or constipation. No melena or hematochezia.  BACK: No back pain  GENITOURINARY: No dysuria, frequency or hematuria  NEUROLOGICAL: No numbness or weakness  SKIN: No itching, burning, rashes, or lesions   All other review of systems is negative unless indicated above.    Physical Exam:  T(F): 97.9 (02-11), Max: 98.4 (02-10)  HR: 86 (02-11) (86 - 112)  BP: 100/62 (02-11) (100/62 - 123/69)  RR: 18 (02-11)  SpO2: 98% (02-11)  GEN: NAD  HEENT: EOMI, clear sclera  PULM: CTA b/l, no wheeze  CV: RRR S1 S2, no murmur, no JVD  ABD: S, NT, ND  EXT: WWP, no edema  PSYCH: normal affect  SKIN: No rash    CXR: Personally reviewed    Labs: Personally reviewed                        8.5    9.86  )-----------( 290      ( 11 Feb 2025 05:26 )             24.5     02-11    137  |  92[L]  |  48[H]  ----------------------------<  84  3.1[L]   |  29  |  2.42[H]    Ca    9.3      11 Feb 2025 05:26

## 2025-02-11 NOTE — PROGRESS NOTE ADULT - SUBJECTIVE AND OBJECTIVE BOX
MR#[id number]  PATIENT NAME:COLE ALBA    DATE OF SERVICE: 02-11-25 @ 07:01  Patient was seen and examined by Yordy Gilmore MD on    02-11-25 @ 07:01 .  Interim events noted.Consultant notes ,Labs,Telemetry reviewed by me       HOSPITAL COURSE: HPI:  64F, hx of CHF (last TTE on 1/16/25 EF 30-35%, G2DD), DM, diabetic foot ulcer with a recent admission at [facility] for CHF exacerbation presented as a transfer for worsening R. leg pain and fluid secretion, On non-invasive imaging demonstrating severe depletion of RLE blood flow beyond the popliteal vessel at knee and similar findings in L. Was transferred to [facility] for CO2 angiogram with Dr. Baltazar. (29 Jan 2025 20:05)      Transferred from [facility]-TTE on previous admission: LVSF moderately decreased w/EF 30-35%. Moderate G2DD. Mild MR. Ischemic evaluation was recommended for which patient undergone stress test which revealed a small-sized moderate defect in the apical wall that is predominantly fixed suggestive of an infarction with minimal marcus-infarct ischemia. 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 Left leg Arterial Duplex: Slightly tardus parvus waveform of the left popliteal artery is noted, for which underlying disease cannot be excluded. Posterior tibial artery waveform is nonpulsatile. Anterior tibial artery tardus parvus flow is noted. Patient started on Heparin and Dobutamine drip with transfer to [facility] for further management.        INTERIM EVENTS:Patient seen at bedside ,interim events noted.  Awake alert remains on Bumex gtt and  gtt at 2.5mcg CO2 Angiogram postponed to next week-she is still orthopneac  02/08-on  5mcg and Bumex gtt   02/10-OOB still orthopneac [id number] LE weakness and swelling  02/11-d/o LE pain not dyspneac  and Bumex held    PMH -reviewed admission note, no change since admission  HEART FAILURE: Acute[x ]Chronic[x ] Systolic[x ] Diastolic[ ] Combined Systolic and Diastolic[ ]  CAD[ ] CABG[ ] PCI[ ]  DEVICES[ ] PPM[ ] ICD[ ] ILR[ ]  ATRIAL FIBRILLATION[ ] Paroxysmal[ ] Permanent[ ] CHADS2-[  ]  JAMEL[ ] CKD1[ ] CKD2[ ] CKD3[ ] CKD4[ ] ESRD[ ]  COPD[ ] HTN[ ]   DM[ ] Type1[ ] Type 2[ ]   CVA[ ] Paresis[ ]    AMBULATION: Assisted[ ] Cane/walker[ ] Independent[ x]          MEDICATIONS  (STANDING):  ampicillin/sulbactam  IVPB 3 Gram(s) IV Intermittent every 12 hours  ampicillin/sulbactam  IVPB      aspirin enteric coated 81 milliGRAM(s) Oral daily  atorvastatin 40 milliGRAM(s) Oral at bedtime  benzocaine/menthol Lozenge 1 Lozenge Oral once  bisacodyl 5 milliGRAM(s) Oral every 12 hours  glucagon  Injectable 1 milliGRAM(s) IntraMuscular once  insulin glargine Injectable (LANTUS) 15 Unit(s) SubCutaneous at bedtime  insulin lispro (ADMELOG) corrective regimen sliding scale   SubCutaneous three times a day before meals  insulin lispro (ADMELOG) corrective regimen sliding scale   SubCutaneous at bedtime  insulin lispro Injectable (ADMELOG) 2 Unit(s) SubCutaneous three times a day with meals  metoprolol succinate ER 25 milliGRAM(s) Oral daily  pantoprazole  Injectable 40 milliGRAM(s) IV Push daily  polyethylene glycol 3350 17 Gram(s) Oral daily  rivaroxaban 2.5 milliGRAM(s) Oral two times a day with meals  senna 2 Tablet(s) Oral at bedtime    MEDICATIONS  (PRN):  acetaminophen     Tablet .. 650 milliGRAM(s) Oral every 6 hours PRN Mild Pain (1 - 3)  dextrose Oral Gel 15 Gram(s) Oral once PRN Blood Glucose LESS THAN 70 milliGRAM(s)/deciliter  melatonin 3 milliGRAM(s) Oral at bedtime PRN Insomnia  ondansetron Injectable 4 milliGRAM(s) IV Push every 6 hours PRN Nausea and/or Vomiting  oxyCODONE    IR 5 milliGRAM(s) Oral every 4 hours PRN Severe Pain (7 - 10)            REVIEW OF SYSTEMS:  Constitutional: [ ] fever, [ ]weight loss,  [ x]fatigue [ ]weight gain  Eyes: [ ] visual changes  Respiratory: [ ]shortness of breath;  [ ] cough, [ ]wheezing, [ ]chills, [ ]hemoptysis  Cardiovascular: [ ] chest pain, [ ]palpitations, [ ]dizziness,  [xx ]leg swelling[ ]orthopnea[ ]PND  Gastrointestinal: [ ] abdominal pain, [ ]nausea, [ ]vomiting,  [ ]diarrhea [ ]Constipation [ ]Melena  Genitourinary: [ ] dysuria, [ ] hematuria [ ][place]  Neurologic: [ ] headaches [ ] tremors[ ]weakness [ ]Paralysis Right[ ] Left[ ]  Skin: [ ] itching, [ ]burning, [ ] rashes  Endocrine: [ ] heat or cold intolerance  Musculoskeletal: [ ] joint pain or swelling; [ x] muscle, back, or extremity pain  Psychiatric: [ ] depression, [ ]anxiety, [ ]mood swings, or [ ]difficulty sleeping  Hematologic: [ ] easy bruising, [ ] bleeding gums    [ ] All remaining systems negative except as per above.   [ ]Unable to obtain.  [x] No change in ROS since admission      Vital Signs Last 24 Hrs  T(C): 36.6 (11 Feb 2025 05:15), Max: 36.9 (10 Feb 2025 17:39)  T(F): 97.8 (11 Feb 2025 05:15), Max: 98.4 (10 Feb 2025 17:39)  HR: 99 (11 Feb 2025 05:15) (99 - 112)  BP: 101/60 (11 Feb 2025 05:15) (101/60 - 123/69)  RR: 18 (11 Feb 2025 05:15) (18 - 18)  SpO2: 92% (11 Feb 2025 05:15) (92% - 93%)    Parameters below as of 11 Feb 2025 05:15  Patient On (Oxygen Delivery Method): room air      I&O's Summary    10 Feb 2025 07:01  -  11 Feb 2025 07:00  --------------------------------------------------------  IN: 249.6 mL / OUT: 1550 mL / NET: -1300.4 mL        PHYSICAL EXAM:  General: No acute distress BMI-29  HEENT: EOMI, PERRL  Neck: Supple, [ ] JVD  Lungs: Equal air entry bilaterally; [ ] rales [ ] wheezing [ ] rhonchi  Heart: Regular rate and rhythm; [x ] murmur   2/6 [ x] systolic [ ] diastolic [ ] radiation[ ] rubs [ ]  gallops  Abdomen: Nontender, bowel sounds present  Extremities: No clubbing, cyanosis, [xx ] edema [ ]Pulses  equal and intact  Nervous system:  Alert & Oriented X3, no focal deficits  Psychiatric: Normal affect  Skin: No rashes or lesions    LABS:  02-11    137  |  92[L]  |  48[H]  ----------------------------<  84  3.1[L]   |  29  |  2.42[H]    Ca    9.3      11 Feb 2025 05:26      Creatinine Trend: 2.42<--, 2.29<--, 2.07<--, 2.16<--, 2.27<--, 2.40<--                        8.5    9.86  )-----------( 290      ( 11 Feb 2025 05:26 )             24.5          TTE W or WO Ultrasound Enhancing Agent (02.10.25 @ 12:09) >  CONCLUSIONS:      1. Left ventricular cavity is mildly dilated. Left ventricular systolic function is severely decreased with an ejection fraction of 20 % by Winchester's method of disks. Regional wall motion abnormalities present.   2. Multiple segmental abnormalities exist. See findings.   The entire septum, entire apex, entire inferior wall, mid inferolateral segment, and mid anterolateral segment are hypokinetic. All remaining scored segments are normal.     3. Reduced right ventricular systolic function.   4. No pericardial effusion seen.   5. Compared to the transthoracic echocardiogram performed on 1/16/2025, Worsening of the left ventricular function.        VA Duplex Lower Ext Vein Scan, Bilat (02.10.25 @ 17:42) >  IMPRESSION: No evidence of bilateral DVT within the imaged veins.

## 2025-02-11 NOTE — PROGRESS NOTE ADULT - ASSESSMENT
63y/o F w/h/o uncontrolled T2DM (A1C 11.6%) on Tresiba and CeQur insulin patch PTA. DM c/b PAD, retinopathy, CKD, CAD. Also h/oType 2 DM, HTN, HLD. Presented to OSH with worsening right leg pain and fluid secretion. Transferred to [facility] for CO2 angiogram. Found to have Low EF to 20% in OSBALDO. Endocrine consulted for Type 2 DM management.   Tolerating POs with BG mostly at goal FBG was 72mg/dl. will further decrease basal insulin to keep BG goal 100 to 180s.          Home regimen: Tresiba 40 units, CeQur insulin patch about 2-10 units TID with meals

## 2025-02-11 NOTE — PROGRESS NOTE ADULT - ASSESSMENT
64F, hx of CHF (last TTE on 1/16/25 EF 30-35%, G2DD), DM, diabetic foot ulcer with a recent admission at [facility] for CHF exacerbation 1/14-1/17 p/w worsening R. leg pain and fluid secretion, was meeting sepsis criteria with podiatry having low suspicion for right cellulitis and admitted for continued management of HF exacerbation and needing ischemic eval.     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  -Left leg Arterial Duplex: Slightly tardus parvus waveform of the left popliteal artery is noted, for which underlying disease cannot be excluded. Posterior tibial artery waveform is nonpulsatile. Anterior tibial artery tardus parvus flow is noted.   Transferred to [facility] for CO2 angiogram as per vascular surgery    #  PAD Wound of lower extremity.   ·  Plan: Podiatry following, b/l serous bullae, no concerns for infection  Found to have RLE coolness  -Right Leg Arterial Duplex: Popliteal artery is occluded with negligible flow of right trifurcation arteries.  -Left leg Arterial Duplex: Slightly tardus parvus waveform of the left popliteal artery is noted, for which underlying disease cannot be excluded. Posterior tibial artery waveform is nonpulsatile. Anterior tibial artery tardus parvus flow is noted.  -Started on heparin gtt and dobutamine gtt -Will transfer to [facility] for CO2 angiogram as per vascular surgery as not candidate for CTA due to worsening SCr  -Keep compression dressing  -LE elevation above heart level at rest.  -Transferred to [facility] for CO2 angiogram   - Overall this patient is at   intermediate  risk (for cardiac death, nonfatal myocardial infarction, and nonfatal cardiac arrest perioperatively for this intermediate  risk procedure).   No cardiac contraindications for CO2 vangiogram  There  are  no further recommendation for risk stratifying imaging/stress testing prior to planned surgery  Plan for outpatient CO2 angiogram as per Vascular  Seen by Podiatry-No acute pod intervention, local wound care only- Pod stable for discharge     # HFrEF (congestive heart failure).   ·  Plan: Hx of HF, previous admission in January, on home Lasix 40 qD, Metoprolol Succ 25 qD. Not on Entresto due to insurance issues  Last TTE: LVSF moderately decreased w/ EF 30-35 %. Moderate G2DD. Mild MR  Stress test: small-sized, moderate defect(s) in the apical wall that is predominantly fixed suggestive of an infarction with minimal marcus-infarct ischemia  Ischemic cardiomyopathy  GDMT on hold 2/2 JAMEL  Patient on Heparin gtt change to Xarelto 2.5mg BID for PAD  Has been on  laquita > 5 days will stop and resume on Milrinone  Repeat TTE EF 20% with WMA  Likely Ischemic cardiomyopathy despite NST revealing fixed defect Probably Multivessel CAD  She refused cardiac cath previously will reattempt    LE EDEMA no DVT      # JAMEL  Creatinine Trend: 2.42<--, 2.29<--,2.07<--2.16<-- 2.27<--2.56<--, 2.82<--, 2.98<--, 3.31<--, 2.65<--, 3.90<-- 1.17<--  Cardiorenal        64F, hx of CHF (last TTE on 1/16/25 EF 30-35%, G2DD), DM, diabetic foot ulcer with a recent admission at [facility] for CHF exacerbation 1/14-1/17 p/w worsening R. leg pain and fluid secretion, was meeting sepsis criteria with podiatry having low suspicion for right cellulitis and admitted for continued management of HF exacerbation and needing ischemic eval.     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  -Left leg Arterial Duplex: Slightly tardus parvus waveform of the left popliteal artery is noted, for which underlying disease cannot be excluded. Posterior tibial artery waveform is nonpulsatile. Anterior tibial artery tardus parvus flow is noted.   Transferred to [facility] for CO2 angiogram as per vascular surgery    #  PAD Wound of lower extremity.   ·  Plan: Podiatry following, b/l serous bullae, no concerns for infection  Found to have RLE coolness  -Right Leg Arterial Duplex: Popliteal artery is occluded with negligible flow of right trifurcation arteries.  -Left leg Arterial Duplex: Slightly tardus parvus waveform of the left popliteal artery is noted, for which underlying disease cannot be excluded. Posterior tibial artery waveform is nonpulsatile. Anterior tibial artery tardus parvus flow is noted.  -Started on heparin gtt and dobutamine gtt -Will transfer to [facility] for CO2 angiogram as per vascular surgery as not candidate for CTA due to worsening SCr  -Keep compression dressing  -LE elevation above heart level at rest.  -Transferred to [facility] for CO2 angiogram   - Overall this patient is at   intermediate  risk (for cardiac death, nonfatal myocardial infarction, and nonfatal cardiac arrest perioperatively for this intermediate  risk procedure).   No cardiac contraindications for CO2 vangiogram  There  are  no further recommendation for risk stratifying imaging/stress testing prior to planned surgery  Plan for outpatient CO2 angiogram as per Vascular  Seen by Podiatry-No acute pod intervention, local wound care only- Pod stable for discharge     # HFrEF (congestive heart failure).   ·  Plan: Hx of HF, previous admission in January, on home Lasix 40 qD, Metoprolol Succ 25 qD. Not on Entresto due to insurance issues  Last TTE: LVSF moderately decreased w/ EF 30-35 %. Moderate G2DD. Mild MR  Stress test: small-sized, moderate defect(s) in the apical wall that is predominantly fixed suggestive of an infarction with minimal marcus-infarct ischemia  Ischemic cardiomyopathy  GDMT on hold 2/2 JAMEL  Patient on Heparin gtt change to Xarelto 2.5mg BID for PAD  Has been on  for > 5 days will stop and resume on Milrinone tomorrow  Repeat TTE EF 20% with WMA  Likely Ischemic cardiomyopathy despite NST revealing fixed defect Probably Multivessel CAD  She refused cardiac cath previously discussed with her and her brother they are now agreeable  Renal follow up optimise precath    LE EDEMA no DVT      # JAMEL  Creatinine Trend: 2.42<--, 2.29<--,2.07<--2.16<-- 2.27<--2.56<--, 2.82<--, 2.98<--, 3.31<--, 2.65<--, 3.90<-- 1.17<--  Cardiorenal

## 2025-02-11 NOTE — PROGRESS NOTE ADULT - ASSESSMENT
64y Female with history of CHF presents as a transfer for LE CO2 angiogram. Nephrology consulted for elevated Scr.    1) JAMEL: likely due to ATN in addition to CRS. Scr increasing with suboptimal UO. IF Scr continues to increase, would discuss with HF/cardiology if patient would be a candidate for milrinone gtt. UA relatively bland. FeUrea low consistent with low flow state. Renal US unremarkable. Bladder scan on 2/3 negative. Avoid nephrotoxins.    2) HTN: BP low normal. Agree with discontinuation of metoprolol.    3) LE edema: Not secondary to nephrotic syndrome given subnephrotic range proteinuria. Bumex gtt increased to 3 mg/hour and patient s/p diamox 500 mg IV X 1 dose earlier today. Possible HTS. TTE with severely decreased LVSF. Monitor UO.    4) Hyperphosphatemia: Resolved. Continue with low phosphorus diet.         [facility] NEPHROLOGY  Raji Waterman M.D.  Mars Feliz D.O.  Kelley Parks M.D.  MD Nancy Kulkarni, MSN, ANP-C    Telephone: [phone]  Facsimile: [phone]    [address] [place] [address], [address]  [place] [address]

## 2025-02-11 NOTE — PHYSICAL THERAPY INITIAL EVALUATION ADULT - NSPTDISCHREC_GEN_A_CORE
TBD pending functional assessment if pt to go home, will require home PT and assistance for ALL functional activities/mobility, and rolling walker ( Pt will require a rolling walker due to dx of impaired balance to help complete MRADLS's./Sub-acute Rehab

## 2025-02-11 NOTE — PROGRESS NOTE ADULT - PROBLEM SELECTOR PLAN 1
- Check BG TID AC and HS while on PO diet  - Decrease Lantus 10 units QHS  - Continue Admelog 2u TID AC for now (HOLD if NPO or eating <50% of meal)  - C/w low dose Admelog correctional scales TID AC and HS  Discharge Planning:   - Likely basal/bolus regimen given kidney function (doses TBD closer to d/c). Not a candidate for SGLT2 due to leg ulcers and also significantly decreased EGFR., can consider GLP1 in addition to Basal/bolus> will need close f/u with Optho due to h/o retinopathy.   Can send Rx for ozempic 0.25mg weekly x 4weeks and increase to 0.50mg, or mounjaro 2.5mg 2.5mg x4 weeks, then increase to 5.0mg weekly. (Patient denies medullary thyroid cancer, hx of pancreatitis or MEN2)  - Please make sure patient has DM management supplies (glucometer, lancets, strips, alcohol pads. pen needles)  -Patient should check BGs before meals and at bedtime.  -Contact PCP/endocrinology if BG is less than 70 X1, greater than  400 X1 or persistently greater than 200s   - Patient should check BG TID AC and HS at home. Can use CGM if pt wishes.  - Endocrine follow up: can f/u with Dr. Grace, Endocrinology.   - Needs Optho/ renal/cardiac /podiatry and vascular follow up

## 2025-02-11 NOTE — PROGRESS NOTE ADULT - SUBJECTIVE AND OBJECTIVE BOX
Chief Complaint: Diabetes Mellitus follow up    INTERVAL HX:  Patient seen at bedside.   Reports eating well, finishes 100% of food on each tray.  BG over the last 24 hrs have been within goal range 100-180mg/dl. No hypoglycemia.     Review of Systems:  General: As above  GI: No nausea, vomiting  Endocrine: no  S&Sx of hypoglycemia    Allergies    No Known Allergies    Intolerances    Augmentin (Stomach Upset; Vomiting; Nausea)    MEDICATIONS  (STANDING):  ampicillin/sulbactam  IVPB 3 Gram(s) IV Intermittent every 12 hours  ampicillin/sulbactam  IVPB      aspirin enteric coated 81 milliGRAM(s) Oral daily  atorvastatin 40 milliGRAM(s) Oral at bedtime  benzocaine/menthol Lozenge 1 Lozenge Oral once  bisacodyl 5 milliGRAM(s) Oral every 12 hours  buMETAnide Infusion 3 mG/Hr (15 mL/Hr) IV Continuous <Continuous>  dextrose 5%. 1000 milliLiter(s) (100 mL/Hr) IV Continuous <Continuous>  dextrose 5%. 1000 milliLiter(s) (50 mL/Hr) IV Continuous <Continuous>  dextrose 50% Injectable 25 Gram(s) IV Push once  dextrose 50% Injectable 12.5 Gram(s) IV Push once  dextrose 50% Injectable 25 Gram(s) IV Push once  glucagon  Injectable 1 milliGRAM(s) IntraMuscular once  insulin glargine Injectable (LANTUS) 15 Unit(s) SubCutaneous at bedtime  insulin lispro (ADMELOG) corrective regimen sliding scale   SubCutaneous three times a day before meals  insulin lispro (ADMELOG) corrective regimen sliding scale   SubCutaneous at bedtime  insulin lispro Injectable (ADMELOG) 2 Unit(s) SubCutaneous three times a day with meals  pantoprazole  Injectable 40 milliGRAM(s) IV Push daily  polyethylene glycol 3350 17 Gram(s) Oral daily  senna 2 Tablet(s) Oral at bedtime      atorvastatin   40 milliGRAM(s) Oral (02-10-25 @ 22:07)    insulin glargine Injectable (LANTUS)   15 Unit(s) SubCutaneous (02-10-25 @ 22:12)    insulin lispro (ADMELOG) corrective regimen sliding scale   1 Unit(s) SubCutaneous (02-11-25 @ 12:49)    insulin lispro Injectable (ADMELOG)   2 Unit(s) SubCutaneous (02-11-25 @ 12:49)   2 Unit(s) SubCutaneous (02-11-25 @ 09:15)   2 Unit(s) SubCutaneous (02-10-25 @ 17:59)        PHYSICAL EXAM:  VITALS: T(C): 36.5 (02-11-25 @ 13:40)  T(F): 97.7 (02-11-25 @ 13:40), Max: 98.4 (02-10-25 @ 17:39)  HR: 90 (02-11-25 @ 15:04) (86 - 107)  BP: 100/65 (02-11-25 @ 15:04) (100/62 - 123/69)  RR:  (18 - 18)  SpO2:  (92% - 99%)  Wt(kg): --  GENERAL: NAD  Respiratory: Respirations unlabored   Extremities: Warm, bilateral LEs wrapped in ace bandages.  NEURO: Alert , appropriate     LABS:  POCT Blood Glucose.: 157 mg/dL (02-11-25 @ 12:33)  POCT Blood Glucose.: 72 mg/dL (02-11-25 @ 08:37)  POCT Blood Glucose.: 117 mg/dL (02-10-25 @ 22:11)  POCT Blood Glucose.: 109 mg/dL (02-10-25 @ 21:07)  POCT Blood Glucose.: 134 mg/dL (02-10-25 @ 17:30)  POCT Blood Glucose.: 198 mg/dL (02-10-25 @ 13:29)  POCT Blood Glucose.: 101 mg/dL (02-10-25 @ 08:23)  POCT Blood Glucose.: 138 mg/dL (02-09-25 @ 21:18)  POCT Blood Glucose.: 163 mg/dL (02-09-25 @ 17:28)  POCT Blood Glucose.: 207 mg/dL (02-09-25 @ 12:16)  POCT Blood Glucose.: 129 mg/dL (02-09-25 @ 08:50)  POCT Blood Glucose.: 157 mg/dL (02-08-25 @ 21:22)  POCT Blood Glucose.: 115 mg/dL (02-08-25 @ 17:15)                          8.5    9.86  )-----------( 290      ( 11 Feb 2025 05:26 )             24.5     02-11    137  |  92[L]  |  48[H]  ----------------------------<  84  3.1[L]   |  29  |  2.42[H]    Ca    9.3      11 Feb 2025 05:26      eGFR: 22 mL/min/1.73m2 (11 Feb 2025 05:26)    01-24 Chol 122 Direct LDL -- LDL calculated 66 HDL 39[L] Trig 89  Thyroid Function Tests:  01-24 @ 05:40 TSH 5.34 FreeT4 -- T3 -- Anti TPO -- Anti Thyroglobulin Ab -- TSI --      A1C with Estimated Average Glucose Result: 11.6 % (01-24-25 @ 05:40)  A1C with Estimated Average Glucose Result: >15.5 % (12-13-24 @ 06:49)    Estimated Average Glucose: 286 mg/dL (01-24-25 @ 05:40)  Estimated Average Glucose: >398 mg/dL (12-13-24 @ 06:49)        Diet, Regular:   Consistent Carbohydrate No Snacks (CSTCHO)  Low Sodium  No Concentrated Phosphorus  Lacto Veg (Accepts Milk & Milk Products) (02-08-25 @ 17:32) [Active]

## 2025-02-11 NOTE — CONSULT NOTE ADULT - ASSESSMENT
64F, hx of HFrEF (previously 30-35%, now 20%), never had LHC, DM, diabetic foot ulcer, severe PAD b/l, presenting initially for vascular angiogram, found to be in ADHF with volume overload. No lactate or other signs of poor perfusion other than JAMEL which initially improved, slightly uptrending last few days which could be cardiorenal. Will need intensification of diuretic regimen with plan for eventual LHC to evaluate for ischemic HF.     Cardiac testing:  TTE 2/10/25: EF 20%, reduced RVSF  TTE 1/16/25: EF 30-35%, grade 2 DD  NST 1/24/25: small moderate defect in apical wall that is predominantly fixed    #HFrEF, likely ischemic  -c/w bumex gtt 3/hr  -give diamox 500mg IV x1, reassess urine output. If insufficient would repeat dose  -can trial hypertonic saline tomorrow if still diuretic refractory  -check LFTs, lactate  -stop beta blocker for now    #CAD  -c/w ASA, xarelto  -plan for eventual LHC/RHC once creatinine downtrends and patient able to lay flat    #PAD  -vascular deferring CO2 angiogram to outpatient

## 2025-02-11 NOTE — PROGRESS NOTE ADULT - SUBJECTIVE AND OBJECTIVE BOX
[facility] NEPHROLOGY- PROGRESS NOTE    64y Female with history of CHF presents as a transfer for LE CO2 angiogram. Nephrology consulted for elevated Scr.    REVIEW OF SYSTEMS:  Gen: no fevers  Cards: no chest pain  Resp: + dyspnea with exertion improving, + cough  GI: no nausea and vomiting, no diarrhea  Vascular: + LE edema    No Known Allergies      Hospital Medications: Medications reviewed        VITALS:  T(F): 97.7 (02-11-25 @ 13:40), Max: 98.4 (02-10-25 @ 17:39)  HR: 90 (02-11-25 @ 15:04)  BP: 100/65 (02-11-25 @ 15:04)  RR: 18 (02-11-25 @ 13:40)  SpO2: 99% (02-11-25 @ 13:40)  Wt(kg): --    02-10 @ 07:01  -  02-11 @ 07:00  --------------------------------------------------------  IN: 249.6 mL / OUT: 1550 mL / NET: -1300.4 mL    02-11 @ 07:01  -  02-11 @ 15:53  --------------------------------------------------------  IN: 360 mL / OUT: 650 mL / NET: -290 mL          PHYSICAL EXAM:    Gen: NAD, calm  Cards: RRR, +S1/S2, no M/G/R  Resp: bibasilar rales  GI: soft, NT/ND, NABS  Vascular: 2+ RLE edema > LLE edema        LABS:  02-11    137  |  92[L]  |  48[H]  ----------------------------<  84  3.1[L]   |  29  |  2.42[H]    Ca    9.3      11 Feb 2025 05:26      Creatinine Trend: 2.42 <--, 2.29 <--, 2.07 <--, 2.16 <--, 2.27 <--, 2.40 <--, 2.56 <--, 2.82 <--                        8.5    9.86  )-----------( 290      ( 11 Feb 2025 05:26 )             24.5     Urine Studies:  Urinalysis Basic - ( 11 Feb 2025 05:26 )    Color:  / Appearance:  / SG:  / pH:   Gluc: 84 mg/dL / Ketone:   / Bili:  / Urobili:    Blood:  / Protein:  / Nitrite:    Leuk Esterase:  / RBC:  / WBC    Sq Epi:  / Non Sq Epi:  / Bacteria:

## 2025-02-11 NOTE — CONSULT NOTE ADULT - ATTENDING COMMENTS
64F PMHx HFrEF and polyvascular dz including severe bilateral PAD and CKD who is presenting with acute on chronic decompensated systolic HF. Has reduced LV systolic function, previously recommended diagnostic LHC but patient declined. Had NM stress test with small area of anterior ischemia. I suspect etiology is more likely ischemic CM, will eventually need more definitive testing. Has not previously been on neurohoromonal therapy. She reports ongoing diabetic foot ulcers bilaterally, worsening recently i/s/o progressive LE edema. Admitted and has been undergoing diuresis; inadequate output despite escalating diuretic regimen and so HF is consulted. PAD to be worked up further as outpatient per vascular.    Recommendations:  - MCS/inotropy: none, please check LFTs and lactate to r/o low output state  - GDMT: would hold BB  - Diuretics: c/w Bumex @3, add Diamox 250 BID, goal UOP 4-5L daily, if not responding then consider 3%NS  - HF workup: R/LHC once euvolemic  - CAD: ASA, would start high-intensity statin
pt with blurry vision OU for a few days and now improving. Pt has known history of PDR with regressed NVD, NVE, sclerotic vessels. also has dry eye. Denies flashes/floaters, denies black out of vision, denies grey out of vision. Likely from PDR vs Dry eye. Please treat dry with artificial tears, and needs retina follow up outpatient. Discussed with patient.

## 2025-02-11 NOTE — PROVIDER CONTACT NOTE (OTHER) - ASSESSMENT
pt A&O x4 complaining of of severe b/l LE pain with no relief from the PRN oxycodone given earlier. VSS

## 2025-02-11 NOTE — PROGRESS NOTE ADULT - SUBJECTIVE AND OBJECTIVE BOX
Name of Patient : TOMASZ ALBA  MRN: [id number]  Date of visit: 02-11-25       Subjective: Patient seen and examined. No new events except as noted.   cont to have SOB  LE edema     REVIEW OF SYSTEMS:    CONSTITUTIONAL: No weakness, fevers or chills  EYES/ENT: No visual changes;  No vertigo or throat pain   NECK: No pain or stiffness  RESPIRATORY: No cough, wheezing, hemoptysis; No shortness of breath  CARDIOVASCULAR: No chest pain or palpitations  GASTROINTESTINAL: No abdominal or epigastric pain. No nausea, vomiting, or hematemesis; No diarrhea or constipation. No melena or hematochezia.  GENITOURINARY: No dysuria, frequency or hematuria  NEUROLOGICAL: No numbness or weakness  SKIN: No itching, burning, rashes, or lesions   All other review of systems is negative unless indicated above.    MEDICATIONS:  MEDICATIONS  (STANDING):  ampicillin/sulbactam  IVPB 3 Gram(s) IV Intermittent every 12 hours  ampicillin/sulbactam  IVPB      aspirin enteric coated 81 milliGRAM(s) Oral daily  atorvastatin 40 milliGRAM(s) Oral at bedtime  benzocaine/menthol Lozenge 1 Lozenge Oral once  bisacodyl 5 milliGRAM(s) Oral every 12 hours  buMETAnide Infusion 3 mG/Hr (15 mL/Hr) IV Continuous <Continuous>  dextrose 5%. 1000 milliLiter(s) (100 mL/Hr) IV Continuous <Continuous>  dextrose 5%. 1000 milliLiter(s) (50 mL/Hr) IV Continuous <Continuous>  dextrose 50% Injectable 25 Gram(s) IV Push once  dextrose 50% Injectable 12.5 Gram(s) IV Push once  dextrose 50% Injectable 25 Gram(s) IV Push once  glucagon  Injectable 1 milliGRAM(s) IntraMuscular once  insulin glargine Injectable (LANTUS) 15 Unit(s) SubCutaneous at bedtime  insulin lispro (ADMELOG) corrective regimen sliding scale   SubCutaneous three times a day before meals  insulin lispro (ADMELOG) corrective regimen sliding scale   SubCutaneous at bedtime  insulin lispro Injectable (ADMELOG) 2 Unit(s) SubCutaneous three times a day with meals  pantoprazole  Injectable 40 milliGRAM(s) IV Push daily  polyethylene glycol 3350 17 Gram(s) Oral daily  senna 2 Tablet(s) Oral at bedtime      PHYSICAL EXAM:  T(C): 36.5 (02-11-25 @ 13:40), Max: 36.9 (02-10-25 @ 17:39)  HR: 90 (02-11-25 @ 15:04) (86 - 107)  BP: 100/65 (02-11-25 @ 15:04) (100/62 - 123/69)  RR: 18 (02-11-25 @ 13:40) (18 - 18)  SpO2: 99% (02-11-25 @ 13:40) (92% - 99%)  Wt(kg): --  I&O's Summary    10 Feb 2025 07:01  -  11 Feb 2025 07:00  --------------------------------------------------------  IN: 249.6 mL / OUT: 1550 mL / NET: -1300.4 mL    11 Feb 2025 07:01  -  11 Feb 2025 16:46  --------------------------------------------------------  IN: 620 mL / OUT: 1050 mL / NET: -430 mL          Appearance: Normal	  HEENT:  PERRLA   Lymphatic: No lymphadenopathy   Cardiovascular: Normal S1 S2, no JVD  Respiratory: normal effort , clear  Gastrointestinal:  Soft, Non-tender  Skin: No rashes,  warm to touch  Psychiatry:  Mood & affect appropriate  Musculuskeletal: LE edema, dressing intact     recent labs, Imaging and EKGs personally reviewed   CODE status discussed with the patient in detail    02-10-25 @ 07:01  -  02-11-25 @ 07:00  --------------------------------------------------------  IN: 249.6 mL / OUT: 1550 mL / NET: -1300.4 mL    02-11-25 @ 07:01  -  02-11-25 @ 16:46  --------------------------------------------------------  IN: 620 mL / OUT: 1050 mL / NET: -430 mL                          8.5    9.86  )-----------( 290      ( 11 Feb 2025 05:26 )             24.5               02-11    137  |  92[L]  |  48[H]  ----------------------------<  84  3.1[L]   |  29  |  2.42[H]    Ca    9.3      11 Feb 2025 05:26                         Urinalysis Basic - ( 11 Feb 2025 05:26 )    Color: x / Appearance: x / SG: x / pH: x  Gluc: 84 mg/dL / Ketone: x  / Bili: x / Urobili: x   Blood: x / Protein: x / Nitrite: x   Leuk Esterase: x / RBC: x / WBC x   Sq Epi: x / Non Sq Epi: x / Bacteria: x

## 2025-02-11 NOTE — CONSULT NOTE ADULT - NS ATTEST RISK PROBLEM GEN_ALL_CORE FT
preparation, patient care, and documentation for this visit, including the following: review of clinical lab tests; review of medical tests/procedures/services, obtaining and/or reviewing separately obtained history, counseling and educating the patient/family/caregiver, referring and communicating with other health care professionals (when not separately reported), documenting clinical information in the electronic health record, and care coordination (not separately reported).

## 2025-02-12 LAB
ANION GAP SERPL CALC-SCNC: 15 MMOL/L — SIGNIFICANT CHANGE UP (ref 5–17)
ANION GAP SERPL CALC-SCNC: 19 MMOL/L — HIGH (ref 5–17)
BUN SERPL-MCNC: 54 MG/DL — HIGH (ref 7–23)
BUN SERPL-MCNC: 56 MG/DL — HIGH (ref 7–23)
CALCIUM SERPL-MCNC: 9.5 MG/DL — SIGNIFICANT CHANGE UP (ref 8.4–10.5)
CALCIUM SERPL-MCNC: 9.5 MG/DL — SIGNIFICANT CHANGE UP (ref 8.4–10.5)
CHLORIDE SERPL-SCNC: 92 MMOL/L — LOW (ref 96–108)
CHLORIDE SERPL-SCNC: 93 MMOL/L — LOW (ref 96–108)
CO2 SERPL-SCNC: 27 MMOL/L — SIGNIFICANT CHANGE UP (ref 22–31)
CO2 SERPL-SCNC: 32 MMOL/L — HIGH (ref 22–31)
CREAT SERPL-MCNC: 2.39 MG/DL — HIGH (ref 0.5–1.3)
CREAT SERPL-MCNC: 2.5 MG/DL — HIGH (ref 0.5–1.3)
EGFR: 21 ML/MIN/1.73M2 — LOW
EGFR: 21 ML/MIN/1.73M2 — LOW
EGFR: 22 ML/MIN/1.73M2 — LOW
EGFR: 22 ML/MIN/1.73M2 — LOW
GLUCOSE BLDC GLUCOMTR-MCNC: 102 MG/DL — HIGH (ref 70–99)
GLUCOSE BLDC GLUCOMTR-MCNC: 146 MG/DL — HIGH (ref 70–99)
GLUCOSE BLDC GLUCOMTR-MCNC: 157 MG/DL — HIGH (ref 70–99)
GLUCOSE BLDC GLUCOMTR-MCNC: 160 MG/DL — HIGH (ref 70–99)
GLUCOSE BLDC GLUCOMTR-MCNC: 209 MG/DL — HIGH (ref 70–99)
GLUCOSE SERPL-MCNC: 161 MG/DL — HIGH (ref 70–99)
GLUCOSE SERPL-MCNC: 84 MG/DL — SIGNIFICANT CHANGE UP (ref 70–99)
HCT VFR BLD CALC: 28.4 % — LOW (ref 34.5–45)
HGB BLD-MCNC: 9.8 G/DL — LOW (ref 11.5–15.5)
MCHC RBC-ENTMCNC: 24.4 PG — LOW (ref 27–34)
MCHC RBC-ENTMCNC: 34.5 G/DL — SIGNIFICANT CHANGE UP (ref 32–36)
MCV RBC AUTO: 70.6 FL — LOW (ref 80–100)
NRBC BLD AUTO-RTO: 0 /100 WBCS — SIGNIFICANT CHANGE UP (ref 0–0)
PLATELET # BLD AUTO: 324 K/UL — SIGNIFICANT CHANGE UP (ref 150–400)
POTASSIUM SERPL-MCNC: 2.8 MMOL/L — CRITICAL LOW (ref 3.5–5.3)
POTASSIUM SERPL-MCNC: 3.5 MMOL/L — SIGNIFICANT CHANGE UP (ref 3.5–5.3)
POTASSIUM SERPL-SCNC: 2.8 MMOL/L — CRITICAL LOW (ref 3.5–5.3)
POTASSIUM SERPL-SCNC: 3.5 MMOL/L — SIGNIFICANT CHANGE UP (ref 3.5–5.3)
RBC # BLD: 4.02 M/UL — SIGNIFICANT CHANGE UP (ref 3.8–5.2)
RBC # FLD: 16 % — HIGH (ref 10.3–14.5)
SODIUM SERPL-SCNC: 138 MMOL/L — SIGNIFICANT CHANGE UP (ref 135–145)
SODIUM SERPL-SCNC: 140 MMOL/L — SIGNIFICANT CHANGE UP (ref 135–145)
WBC # BLD: 11.44 K/UL — HIGH (ref 3.8–10.5)
WBC # FLD AUTO: 11.44 K/UL — HIGH (ref 3.8–10.5)

## 2025-02-12 PROCEDURE — 99232 SBSQ HOSP IP/OBS MODERATE 35: CPT

## 2025-02-12 RX ORDER — ACETAZOLAMIDE 250 MG/1
500 TABLET ORAL ONCE
Refills: 0 | Status: COMPLETED | OUTPATIENT
Start: 2025-02-12 | End: 2025-02-12

## 2025-02-12 RX ORDER — OXYCODONE HYDROCHLORIDE 30 MG/1
5 TABLET ORAL ONCE
Refills: 0 | Status: DISCONTINUED | OUTPATIENT
Start: 2025-02-12 | End: 2025-02-12

## 2025-02-12 RX ORDER — LINAGLIPTIN 5 MG/1
5 TABLET, FILM COATED ORAL DAILY
Refills: 0 | Status: DISCONTINUED | OUTPATIENT
Start: 2025-02-12 | End: 2025-03-05

## 2025-02-12 RX ORDER — SPIRONOLACTONE 25 MG
25 TABLET ORAL ONCE
Refills: 0 | Status: COMPLETED | OUTPATIENT
Start: 2025-02-12 | End: 2025-02-12

## 2025-02-12 RX ORDER — HEPARIN SODIUM 1000 [USP'U]/ML
5000 INJECTION INTRAVENOUS; SUBCUTANEOUS EVERY 8 HOURS
Refills: 0 | Status: DISCONTINUED | OUTPATIENT
Start: 2025-02-12 | End: 2025-02-19

## 2025-02-12 RX ORDER — ACETAZOLAMIDE 250 MG/1
500 TABLET ORAL ONCE
Refills: 0 | Status: DISCONTINUED | OUTPATIENT
Start: 2025-02-12 | End: 2025-02-12

## 2025-02-12 RX ORDER — INSULIN GLARGINE-YFGN 100 [IU]/ML
10 INJECTION, SOLUTION SUBCUTANEOUS AT BEDTIME
Refills: 0 | Status: DISCONTINUED | OUTPATIENT
Start: 2025-02-12 | End: 2025-02-18

## 2025-02-12 RX ORDER — ACETAMINOPHEN 500 MG/5ML
1000 LIQUID (ML) ORAL ONCE
Refills: 0 | Status: COMPLETED | OUTPATIENT
Start: 2025-02-12 | End: 2025-02-12

## 2025-02-12 RX ADMIN — Medication 1000 MILLIGRAM(S): at 19:01

## 2025-02-12 RX ADMIN — AMPICILLIN SODIUM AND SULBACTAM SODIUM 200 GRAM(S): 1; .5 INJECTION, POWDER, FOR SOLUTION INTRAMUSCULAR; INTRAVENOUS at 09:24

## 2025-02-12 RX ADMIN — OXYCODONE HYDROCHLORIDE 5 MILLIGRAM(S): 30 TABLET ORAL at 14:29

## 2025-02-12 RX ADMIN — INSULIN LISPRO 2 UNIT(S): 100 INJECTION, SOLUTION INTRAVENOUS; SUBCUTANEOUS at 09:31

## 2025-02-12 RX ADMIN — OXYCODONE HYDROCHLORIDE 5 MILLIGRAM(S): 30 TABLET ORAL at 05:11

## 2025-02-12 RX ADMIN — BUMETANIDE 15 MG/HR: 1 TABLET ORAL at 21:18

## 2025-02-12 RX ADMIN — OXYCODONE HYDROCHLORIDE 5 MILLIGRAM(S): 30 TABLET ORAL at 21:41

## 2025-02-12 RX ADMIN — AMPICILLIN SODIUM AND SULBACTAM SODIUM 200 GRAM(S): 1; .5 INJECTION, POWDER, FOR SOLUTION INTRAMUSCULAR; INTRAVENOUS at 21:17

## 2025-02-12 RX ADMIN — Medication 400 MILLIGRAM(S): at 18:31

## 2025-02-12 RX ADMIN — Medication 100 MILLIEQUIVALENT(S): at 17:58

## 2025-02-12 RX ADMIN — Medication 5 MILLIGRAM(S): at 05:11

## 2025-02-12 RX ADMIN — Medication 100 MILLIEQUIVALENT(S): at 20:33

## 2025-02-12 RX ADMIN — HEPARIN SODIUM 5000 UNIT(S): 1000 INJECTION INTRAVENOUS; SUBCUTANEOUS at 21:19

## 2025-02-12 RX ADMIN — BUMETANIDE 15 MG/HR: 1 TABLET ORAL at 05:11

## 2025-02-12 RX ADMIN — OXYCODONE HYDROCHLORIDE 5 MILLIGRAM(S): 30 TABLET ORAL at 10:33

## 2025-02-12 RX ADMIN — OXYCODONE HYDROCHLORIDE 5 MILLIGRAM(S): 30 TABLET ORAL at 20:41

## 2025-02-12 RX ADMIN — INSULIN LISPRO 2: 100 INJECTION, SOLUTION INTRAVENOUS; SUBCUTANEOUS at 13:30

## 2025-02-12 RX ADMIN — Medication 25 MILLIGRAM(S): at 13:36

## 2025-02-12 RX ADMIN — OXYCODONE HYDROCHLORIDE 5 MILLIGRAM(S): 30 TABLET ORAL at 16:07

## 2025-02-12 RX ADMIN — OXYCODONE HYDROCHLORIDE 5 MILLIGRAM(S): 30 TABLET ORAL at 09:33

## 2025-02-12 RX ADMIN — OXYCODONE HYDROCHLORIDE 5 MILLIGRAM(S): 30 TABLET ORAL at 13:29

## 2025-02-12 RX ADMIN — Medication 40 MILLIGRAM(S): at 11:40

## 2025-02-12 RX ADMIN — OXYCODONE HYDROCHLORIDE 5 MILLIGRAM(S): 30 TABLET ORAL at 06:11

## 2025-02-12 RX ADMIN — Medication 2 TABLET(S): at 21:19

## 2025-02-12 RX ADMIN — Medication 81 MILLIGRAM(S): at 11:40

## 2025-02-12 RX ADMIN — ATORVASTATIN CALCIUM 40 MILLIGRAM(S): 80 TABLET, FILM COATED ORAL at 21:20

## 2025-02-12 RX ADMIN — INSULIN GLARGINE-YFGN 10 UNIT(S): 100 INJECTION, SOLUTION SUBCUTANEOUS at 21:26

## 2025-02-12 RX ADMIN — HEPARIN SODIUM 5000 UNIT(S): 1000 INJECTION INTRAVENOUS; SUBCUTANEOUS at 13:36

## 2025-02-12 RX ADMIN — INSULIN LISPRO 1: 100 INJECTION, SOLUTION INTRAVENOUS; SUBCUTANEOUS at 17:42

## 2025-02-12 RX ADMIN — LINAGLIPTIN 5 MILLIGRAM(S): 5 TABLET, FILM COATED ORAL at 11:41

## 2025-02-12 RX ADMIN — Medication 100 MILLIEQUIVALENT(S): at 16:08

## 2025-02-12 RX ADMIN — ACETAZOLAMIDE 500 MILLIGRAM(S): 250 TABLET ORAL at 21:29

## 2025-02-12 RX ADMIN — Medication 40 MILLIEQUIVALENT(S): at 09:30

## 2025-02-12 RX ADMIN — Medication 40 MILLIEQUIVALENT(S): at 08:08

## 2025-02-12 RX ADMIN — OXYCODONE HYDROCHLORIDE 5 MILLIGRAM(S): 30 TABLET ORAL at 17:07

## 2025-02-12 NOTE — PROGRESS NOTE ADULT - SUBJECTIVE AND OBJECTIVE BOX
MR#[id number]  PATIENT NAME:COLE ALBA    DATE OF SERVICE: 02-12-25 @ 06:36  Patient was seen and examined by Yordy Gilmore MD on    02-12-25 @ 06:36 .  Interim events noted.Consultant notes ,Labs,Telemetry reviewed by me       HOSPITAL COURSE: 64F, hx of CHF (last TTE on 1/16/25 EF 30-35%, G2DD), DM, diabetic foot ulcer with a recent admission at [facility] for CHF exacerbation presented as a transfer for worsening R. leg pain and fluid secretion, On non-invasive imaging demonstrating severe depletion of RLE blood flow beyond the popliteal vessel at knee and similar findings in L. Was transferred to [facility] for CO2 angiogram with Dr. Baltazar. (29 Jan 2025 20:05)      Transferred from [facility]-TTE on previous admission: LVSF moderately decreased w/EF 30-35%. Moderate G2DD. Mild MR. Ischemic evaluation was recommended for which patient undergone stress test which revealed a small-sized moderate defect in the apical wall that is predominantly fixed suggestive of an infarction with minimal marcus-infarct ischemia. 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 Left leg Arterial Duplex: Slightly tardus parvus waveform of the left popliteal artery is noted, for which underlying disease cannot be excluded. Posterior tibial artery waveform is nonpulsatile. Anterior tibial artery tardus parvus flow is noted. Patient started on Heparin and Dobutamine drip with transfer to [facility] for further management.        INTERIM EVENTS:Patient seen at bedside ,interim events noted.  Awake alert remains on Bumex gtt and  gtt at 2.5mcg CO2 Angiogram postponed to next week-she is still orthopneac  02/08-on  5mcg and Bumex gtt   02/10-OOB still orthopneac [id number] LE weakness and swelling  02/11-c/o LE pain not dyspneac  held on Bumex 3 mg/Hr  [facility], no change since admission  HEART FAILURE: Acute[x ]Chronic[x ] Systolic[x ] Diastolic[ ] Combined Systolic and Diastolic[ ]  CAD[ ] CABG[ ] PCI[ ]  DEVICES[ ] PPM[ ] ICD[ ] ILR[ ]  ATRIAL FIBRILLATION[ ] Paroxysmal[ ] Permanent[ ] CHADS2-[  ]  JAMEL[ ] CKD1[ ] CKD2[ ] CKD3[ ] CKD4[ ] ESRD[ ]  COPD[ ] HTN[ ]   DM[ ] Type1[ ] Type 2[ ]   CVA[ ] Paresis[ ]    AMBULATION: Assisted[ ] Cane/walker[ ] Independent[ x]          MEDICATIONS  (STANDING):  ampicillin/sulbactam  IVPB 3 Gram(s) IV Intermittent every 12 hours  ampicillin/sulbactam  IVPB      aspirin enteric coated 81 milliGRAM(s) Oral daily  atorvastatin 40 milliGRAM(s) Oral at bedtime  benzocaine/menthol Lozenge 1 Lozenge Oral once  bisacodyl 5 milliGRAM(s) Oral every 12 hours  buMETAnide Infusion 3 mG/Hr (15 mL/Hr) IV Continuous <Continuous>  dextrose 5%. 1000 milliLiter(s) (100 mL/Hr) IV Continuous <Continuous>  dextrose 5%. 1000 milliLiter(s) (50 mL/Hr) IV Continuous <Continuous>  dextrose 50% Injectable 25 Gram(s) IV Push once  dextrose 50% Injectable 12.5 Gram(s) IV Push once  dextrose 50% Injectable 25 Gram(s) IV Push once  glucagon  Injectable 1 milliGRAM(s) IntraMuscular once  insulin glargine Injectable (LANTUS) 15 Unit(s) SubCutaneous at bedtime  insulin lispro (ADMELOG) corrective regimen sliding scale   SubCutaneous three times a day before meals  insulin lispro (ADMELOG) corrective regimen sliding scale   SubCutaneous at bedtime  insulin lispro Injectable (ADMELOG) 2 Unit(s) SubCutaneous three times a day with meals  pantoprazole  Injectable 40 milliGRAM(s) IV Push daily  polyethylene glycol 3350 17 Gram(s) Oral daily  senna 2 Tablet(s) Oral at bedtime    MEDICATIONS  (PRN):  acetaminophen     Tablet .. 650 milliGRAM(s) Oral every 6 hours PRN Mild Pain (1 - 3)  dextrose Oral Gel 15 Gram(s) Oral once PRN Blood Glucose LESS THAN 70 milliGRAM(s)/deciliter  melatonin 3 milliGRAM(s) Oral at bedtime PRN Insomnia  ondansetron Injectable 4 milliGRAM(s) IV Push every 6 hours PRN Nausea and/or Vomiting  oxyCODONE    IR 5 milliGRAM(s) Oral every 4 hours PRN Severe Pain (7 - 10)            REVIEW OF SYSTEMS:  Constitutional: [ ] fever, [ ]weight loss,  [ x]fatigue [ ]weight gain  Eyes: [ ] visual changes  Respiratory: [ ]shortness of breath;  [ ] cough, [ ]wheezing, [ ]chills, [ ]hemoptysis  Cardiovascular: [ ] chest pain, [ ]palpitations, [ ]dizziness,  [xx ]leg swelling[ ]orthopnea[ ]PND  Gastrointestinal: [ ] abdominal pain, [ ]nausea, [ ]vomiting,  [ ]diarrhea [ ]Constipation [ ]Melena  Genitourinary: [ ] dysuria, [ ] hematuria [ ][place]  Neurologic: [ ] headaches [ ] tremors[ ]weakness [ ]Paralysis Right[ ] Left[ ]  Skin: [ ] itching, [ ]burning, [ ] rashes  Endocrine: [ ] heat or cold intolerance  Musculoskeletal: [ ] joint pain or swelling; [ ] muscle, back, or extremity pain  Psychiatric: [ ] depression, [ ]anxiety, [ ]mood swings, or [ ]difficulty sleeping  Hematologic: [ ] easy bruising, [ ] bleeding gums    [ ] All remaining systems negative except as per above.   [ ]Unable to obtain.  [x] No change in ROS since admission      Vital Signs Last 24 Hrs  T(C): 36.7 (12 Feb 2025 05:55), Max: 36.9 (11 Feb 2025 21:18)  T(F): 98.1 (12 Feb 2025 05:55), Max: 98.5 (11 Feb 2025 21:18)  HR: 86 (12 Feb 2025 05:55) (86 - 94)  BP: 100/64 (12 Feb 2025 05:55) (100/62 - 110/75)  RR: 18 (12 Feb 2025 05:55) (18 - 18)  SpO2: 98% (12 Feb 2025 05:55) (96% - 99%)    Parameters below as of 12 Feb 2025 05:55  Patient On (Oxygen Delivery Method): room air      I&O's Summary    10 Feb 2025 07:01  -  11 Feb 2025 07:00  --------------------------------------------------------  IN: 249.6 mL / OUT: 1550 mL / NET: -1300.4 mL    11 Feb 2025 07:01  -  12 Feb 2025 06:36  --------------------------------------------------------  IN: 975 mL / OUT: 2100 mL / NET: -1125 mL        PHYSICAL EXAM:  General: No acute distress BMI-25  HEENT: EOMI, PERRL  Neck: Supple, [x ] JVD  Lungs: Equal air entry bilaterally; [ ] rales [ ] wheezing [ ] rhonchi  Heart: Regular rate and rhythm; [x ] murmur   2/6 [ x] systolic [ ] diastolic [ ] radiation[ ] rubs [ ]  gallops  Abdomen: Nontender, bowel sounds present  Extremities: No clubbing, cyanosis, [xx ] edema [ ]Pulses  equal and intact  Nervous system:  Alert & Oriented X3, no focal deficits  Psychiatric: Normal affect  Skin: No rashes or lesions    LABS:  02-11    137  |  93[L]  |  53[H]  ----------------------------<  81  3.5   |  29  |  2.49[H]    Ca    9.3      11 Feb 2025 20:06    TPro  8.4[H]  /  Alb  4.4  /  TBili  0.8  /  DBili  x   /  AST  24  /  ALT  17  /  AlkPhos  68  02-11    Creatinine Trend: 2.49<--, 2.42<--, 2.29<--, 2.07<--, 2.16<--, 2.27<--                        8.5    9.86  )-----------( 290      ( 11 Feb 2025 05:26 )             24.5       Lactate, Blood: 1.5 <--1.5 <--1.6  mmol/L     VA Duplex Lower Ext Vein Scan, Bilat (02.10.25 @ 17:42) >  IMPRESSION: No evidence of bilateral DVT within the imaged veins.       TTE W or WO Ultrasound Enhancing Agent (02.10.25 @ 12:09) >  CONCLUSIONS:      1. Left ventricular cavity is mildly dilated. Left ventricular systolic function is severely decreased with an ejection fraction of 20 % by Winchester's method of disks. Regional wall motion abnormalities present.   2. Multiple segmental abnormalities exist. See findings.   The entire septum, entire apex, entire inferior wall, mid inferolateral segment, and mid anterolateral segment are hypokinetic. All remaining scored segments are normal.     3. Reduced right ventricular systolic function.   4. No pericardial effusion seen.   5. Compared to the transthoracic echocardiogram performed on 1/16/2025, Worsening of the left ventricular function.        VA Duplex Lower Ext Vein Scan, Bilat (02.10.25 @ 17:42) >  IMPRESSION: No evidence of bilateral DVT within the imaged veins.        Nuclear Stress Test-Pharmacologic.. (01.24.25 @ 10:31) >  Conclusions:   1. Myocardial Perfusion: Abnormal.   2. Qualitative Perfusion:      - small-sized, moderate defect(s) in the apical wall that is predominantly fixed suggestive of an infarction with minimal marcus-infarct ischemia.   3. The post stress left ventricular EF is 25 %. The stress end diastolic volume is 118 ml.   4. Results D/Wcardiologist Dr. Gilmore at 18:31

## 2025-02-12 NOTE — CHART NOTE - NSCHARTNOTEFT_GEN_A_CORE
Reviewed BG levels. Noted BG between 80s to low 100s most of the time. FBG today 84 while on present insulin doses. Also BG 92 ac dinner yesterday after getting 2 units of insulin with lunch. Adjusted insulin to BG goal 100 to 180s.     POCT Blood Glucose.: 160 mg/dL (02-12-25 @ 17:30)  POCT Blood Glucose.: 209 mg/dL (02-12-25 @ 13:29)  POCT Blood Glucose.: 157 mg/dL (02-12-25 @ 12:23)  POCT Blood Glucose.: 102 mg/dL (02-12-25 @ 08:27)  POCT Blood Glucose.: 106 mg/dL (02-11-25 @ 21:33)  POCT Blood Glucose.: 97 mg/dL (02-11-25 @ 17:29)  POCT Blood Glucose.: 157 mg/dL (02-11-25 @ 12:33)  POCT Blood Glucose.: 72 mg/dL (02-11-25 @ 08:37)  POCT Blood Glucose.: 117 mg/dL (02-10-25 @ 22:11)  POCT Blood Glucose.: 109 mg/dL (02-10-25 @ 21:07)    MEDICATIONS:  ampicillin/sulbactam  IVPB 3 Gram(s) IV Intermittent every 12 hours  atorvastatin 40 milliGRAM(s) Oral at bedtime  insulin glargine Injectable (LANTUS) 10 Unit(s) SubCutaneous at bedtime  insulin lispro (ADMELOG) corrective regimen sliding scale   SubCutaneous three times a day before meals  insulin lispro (ADMELOG) corrective regimen sliding scale   SubCutaneous at bedtime  linagliptin 5 milliGRAM(s) Oral daily                            9.8    11.44 )-----------( 324      ( 12 Feb 2025 06:51 )             28.4       02-12    140  |  93[L]  |  56[H]  ----------------------------<  161[H]  3.5   |  32[H]  |  2.50[H]    Ca    9.5      12 Feb 2025 15:43    TPro  8.4[H]  /  Alb  4.4  /  TBili  0.8  /  DBili  x   /  AST  24  /  ALT  17  /  AlkPhos  68  02-11      Diet, Regular:   Consistent Carbohydrate {No Snacks} (CSTCHO)  Low Sodium  No Concentrated Phosphorus  Lacto Veg (Accepts Milk & Milk Products) (02-08-25 @ 17:32) [Active]      PLAN:   - Check BG TID AC and HS while on PO diet  - Decreased Lantus dose to 10u QHS  - Discontinued Admelog 2u TID AC for now   - Started Tradjenta 5mg daily  - C/w low dose Admelog correctional scales TID AC and HS  - Will follow

## 2025-02-12 NOTE — PROGRESS NOTE ADULT - ASSESSMENT
64F, hx of HFrEF (previously 30-35%, now 20%), never had LHC, DM, diabetic foot ulcer, severe PAD b/l, presenting initially for vascular angiogram, found to be in ADHF with volume overload. No lactate or other signs of poor perfusion other than JAMEL which initially improved, slightly uptrending last few days which could be cardiorenal. Will need intensification of diuretic regimen with plan for eventual LHC to evaluate for ischemic HF.     Cardiac testing:  TTE 2/10/25: EF 20%, reduced RVSF  TTE 1/16/25: EF 30-35%, grade 2 DD  NST 1/24/25: small moderate defect in apical wall that is predominantly fixed    #HFrEF, likely ischemic  -c/w bumex gtt 3/hr  -please replete K aggressively, recheck BMP  -start spironolactone 25mg  -once K is repleted, start patient on standing diamox 500mg IV QD  -daily weights, goal IO -2L    #CAD  -c/w ASA  -plan for eventual LHC/RHC once creatinine downtrends and patient able to lay flat    #PAD  -vascular deferring CO2 angiogram to outpatient

## 2025-02-12 NOTE — PROGRESS NOTE ADULT - ASSESSMENT
64y Female with history of CHF presents as a transfer for LE CO2 angiogram. Nephrology consulted for elevated Scr.    1) JAMEL: likely due to ATN in addition to CRS. Scr relatively stable and near alvaro during admission. Continue with IV diuresis. UA relatively bland. FeUrea low consistent with low flow state. Renal US unremarkable. Bladder scan on 2/3 negative. Avoid nephrotoxins.    2) HTN: BP low normal. Monitor off anti-hypertensive medications.    3) LE edema: Not secondary to nephrotic syndrome given subnephrotic range proteinuria. Continue with bumex gtt @ 3 mg/hour. Agree with starting aldactone 25 mg daily. Diamox once hypokalemia repleted. TTE with severely decreased LVSF. Monitor UO.    4) Hyperphosphatemia: Resolved. Continue with low phosphorus diet.         [facility] NEPHROLOGY  Raji Waterman M.D.  Mars Feliz D.O.  Kelley Parks M.D.  MD Nancy Kulkarni, MSN, ANP-C    Telephone: [phone]  Facsimile: [phone]    [facility] [place] [address], [address]  [place] [address]

## 2025-02-12 NOTE — PROGRESS NOTE ADULT - ASSESSMENT
65 YO F with PMHx of HFrEF 30-35 and grade 2 ddfxn (last TTE on 01/16/25), DM2, and diabetic foot ulcer. Recent admission at [facility] for ADHF. Patient now represents to OSH and ultimately to [facility] as a transfer for worsening right leg pain and fluid secretion. As per documentation, patient was reported to have severe depletion of RLE blood flow beyond the popliteal vessel at knee and similar findings in the left. Patient was transferred to [facility] for CO2 angiogram with Dr. Baltazar. Of note, patient also with reported visual disturbance for which patient was seen and evaluated by opthalmology. Internal Medicine has been consulted on Ms. Kirby's care for medical management.     # ADHF  - Recent admission at [facility] for ADHF  - TTE 1/16 with EF 30-35 with moderately reduced LVSF and grade 2 ddfxn, normal RVSF , trace TR/ CT, and trace pericardial effusion  - NST abnormal with small-sized, moderate defect in apical wall that is predominantly fixed suggestive of an infarction with minimal marcus-infarct ischemia. Post stress LVEF 25.  - CT Chest with small BL pleural effusions and small pericardial effusion.  - GDMT as per Cardio with Toprol XL 25 PO Qd   - In view of JAMEL further GDMT medications currently held   - Continue on dobutamine GTT, increased to 5mcg per cards,  monitor   - On Bumex mg GTT per renal team, monitor I and O, diuresis per Cardio/ Renal    - GDMT as per Cardio  - F/u repeat TTE, performed, read is pending   - Serial CXR   - Monitor volume status   - Check daily weights    - Monitor on telemetry   - Cardio eval appreciated  - HF eval called for HF management , appreciated diuresis     # BL LE Edema likely in setting of ADHF  - Diuresis as per Cardio and Renal   - BL LE elevation and compression  - Check LE Duplex   - Monitor for now  - Podiatry and Vascular evals appreciated; F/u recs    # Pericardial effusion likely in setting ADHF  - TTE with trace pericardial effusion   - CT Chest with small pericardial effusion   - Denies chest pain, palpitations, chest tightness or discomfort, shortness of breath or dyspnea   - Repeat TTE in 1 month for further evaluation   - Diuresis per cardio/ renal.  - Monitor on telemetry  - Cardio following    # Pleural effusion likely in setting ADHF  - CT Chest with small BL pleural effusions  - On RA with no respiratory complaints at present  - Monitor O2 saturation  - Supplement to maintain > 90%   - Diuresis per cardio/ renal.     # JAMEL with Hyponatremia and Metabolic Acidosis   - As per OSH documentation, JAMEL was thought to be second to Unasyn/ Bumex / Entresto use and Hypotension   - US RENAL with no hydronephrosis  - CRE improving slightly with diuresis/ dobutamine and likely cardiorenal induced with low flow state in ADHF  - Avoid nephrotoxic agents  - Monitor Cr and daily BMP - up-trend in Cr. Monitor   - Avoid overcorrection of Na > 6-8 mEq in 24 hours   - Albumin per renal   - Renal eval appreciated    # Leukocytosis likely in setting of BL LE ulcers with pop occlusion   - BCx negative   - UCx with probable contamination   - On unasyn for ABX. Frequency increased to Q12 as per Renal   - Trend CBC, temp curve, VS and adjust as tolerated    # Foot ulcers with worsening RLE pain second to pop artery occlusion +/- diabetic ulcers   - RLE Arterial Duplex with popliteal artery occlusion   - LLE Arterial Duplex with underlying disease cannot be excluded   - Patient transferred to [facility] for CO2 angiogram, however given ADHF currently not medically optimized and high risk. Plan to continue on diuresis and dobutamine as above   - Was on Heparin GTT for now, now on Eliquis 2.5 PA   - On Unasyn for ABX   - Continue on Lipitor  - Monitor PLT and HH while on AC   - Vascular following  - Wound care called for dressing recs     # Tachycardia likely pain related   - On Toprol and improved  - Continue to monitor on tele   - Cardio eval appreciated    # Visual Disturbance  - CT Head w/ old infarcts  - On ASA and Statin   - Opthalmology eval appreciated    # Indigestion and Constipation   - PPI, miralax and senna continued  - MOnitor BMs    # Anemia likely mixed AOCD vs iron deficiency   - HH stable in 9s on heparin GGT   - Consider iron tabs when optimized   - Monitor HH   - Transfuse for Hgb < 8  - Maintain active T/S    # Diabetes Mellitus A1C 11.6   - Continue on lantus 18 with lispro 2 and ISS   - Endocrine following    # HLD and HLD  - Continue on lipitor and toprol  - Monitor BP    # DISPO TBD        Patient seen and examined by me. patient care and plan discussed and reviewed with PA. Plan as outlined above edited by me to reflect our discussion. Advanced care planning/advanced directives discussed with patient/family. DNR status including forceful chest compressions to attempt to restart the heart, ventilator support/artificial breathing, electric shock, artificial nutrition, health care proxy, Molst form all discussed with pt. Sixty seven minutes of total time dedicated to this patient visit today including preparing to see the patient (eg. review of tests), obtaining and/or reviewing separately obtained history, obtaining/reviewing vitals, performing a medically appropriate examination and/or evaluation, counseling and educating the patient/family/caregiver, reviewing previous notes and test results, and procedures, communicating with other health professionals (when not separately reported), and documenting clinical information in the electronic health record.

## 2025-02-12 NOTE — PROGRESS NOTE ADULT - SUBJECTIVE AND OBJECTIVE BOX
[facility] NEPHROLOGY- PROGRESS NOTE    64y Female with history of CHF presents as a transfer for LE CO2 angiogram. Nephrology consulted for elevated Scr.    REVIEW OF SYSTEMS:  Gen: no fevers  Cards: no chest pain  Resp: + dyspnea with exertion improving, + cough  GI: no nausea and vomiting, no diarrhea  Vascular: + LE edema improving    No Known Allergies      Hospital Medications: Medications reviewed        VITALS:  T(F): 98.2 (02-12-25 @ 13:45), Max: 98.5 (02-11-25 @ 21:18)  HR: 99 (02-12-25 @ 13:45)  BP: 101/65 (02-12-25 @ 13:45)  RR: 18 (02-12-25 @ 13:45)  SpO2: 97% (02-12-25 @ 13:45)  Wt(kg): --    02-11 @ 07:01  -  02-12 @ 07:00  --------------------------------------------------------  IN: 1170 mL / OUT: 2900 mL / NET: -1730 mL    02-12 @ 07:01  -  02-12 @ 14:59  --------------------------------------------------------  IN: 0 mL / OUT: 450 mL / NET: -450 mL        PHYSICAL EXAM:    Gen: NAD, calm  Cards: RRR, +S1/S2, no M/G/R  Resp: bibasilar rales  GI: soft, NT/ND, NABS  Vascular: 2+ RLE edema > LLE edema        LABS:  02-12    138  |  92[L]  |  54[H]  ----------------------------<  84  2.8[LL]   |  27  |  2.39[H]    Ca    9.5      12 Feb 2025 06:51    TPro  8.4[H]  /  Alb  4.4  /  TBili  0.8  /  DBili      /  AST  24  /  ALT  17  /  AlkPhos  68  02-11    Creatinine Trend: 2.39 <--, 2.49 <--, 2.42 <--, 2.29 <--, 2.07 <--, 2.16 <--, 2.27 <--, 2.40 <--, 2.56 <--                        9.8    11.44 )-----------( 324      ( 12 Feb 2025 06:51 )             28.4     Urine Studies:  Urinalysis Basic - ( 12 Feb 2025 06:51 )    Color:  / Appearance:  / SG:  / pH:   Gluc: 84 mg/dL / Ketone:   / Bili:  / Urobili:    Blood:  / Protein:  / Nitrite:    Leuk Esterase:  / RBC:  / WBC    Sq Epi:  / Non Sq Epi:  / Bacteria:

## 2025-02-12 NOTE — PROGRESS NOTE ADULT - ASSESSMENT
64F presents with bilateral lower extremity wounds   - Patient seen and evaluated  - Afebrile, WBC 11.44  - Bilateral lower extremity venous stasis wounds to dermis, mild serous drainage, no acute signs of infection. Left foot hallux distal tuft well adhered eschar, no acute signs of infection.   - Bilateral foot xray: no OM, no gas   - No culture taken secondary to no acute signs of infection   - Vasc recs, appreciated, planning outpatient angio   - No acute pod intervention, local wound care only  - Pod stable for discharge   - Wound care instruction and follow up information in discharge note provider   - Seen with attending    64F presents with bilateral lower extremity wounds   - Patient seen and evaluated  - Afebrile, WBC 11.44  - Bilateral lower extremity venous stasis wounds to dermis, mild serous drainage, no acute signs of infection. Left foot hallux distal tuft well adhered eschar, no acute signs of infection.   - Bilateral foot xray: no OM, no gas   - No culture taken secondary to no acute signs of infection   - Vasc recs, appreciated, planning outpatient angio   - No acute pod intervention, local wound care only  - Pod stable for discharge   - Wound care instruction and follow up information in discharge note provider   - Seen with attending, Dr Nowak

## 2025-02-12 NOTE — PROVIDER CONTACT NOTE (CRITICAL VALUE NOTIFICATION) - BACKGROUND
Pt transferred to [facility] for angiogram. Hx of DM, , CHF
PMH DM, CHF.
Pt admitted for worsening R leg pain and nontraumatic ischemic infarction of muscle of L hand.  PMH CAD, CHF, Dm foot ulcer, DM

## 2025-02-12 NOTE — PROGRESS NOTE ADULT - SUBJECTIVE AND OBJECTIVE BOX
Patient seen and examined at bedside.    Overnight Events: diuresing better    REVIEW OF SYSTEMS:  CONSTITUTIONAL: No weakness, fevers or chills  EYES/ENT: No visual changes;  No dysphagia  NECK: No pain or stiffness  RESPIRATORY: No cough, wheezing, hemoptysis; No shortness of breath  CARDIOVASCULAR: No chest pain or palpitations; No lower extremity edema  GASTROINTESTINAL: No abdominal or epigastric pain. No nausea, vomiting, or hematemesis; No diarrhea or constipation. No melena or hematochezia.  BACK: No back pain  GENITOURINARY: No dysuria, frequency or hematuria  NEUROLOGICAL: No numbness or weakness  SKIN: No itching, burning, rashes, or lesions   All other review of systems is negative unless indicated above.            Current Meds:  acetaminophen     Tablet .. 650 milliGRAM(s) Oral every 6 hours PRN  ampicillin/sulbactam  IVPB 3 Gram(s) IV Intermittent every 12 hours  ampicillin/sulbactam  IVPB      aspirin enteric coated 81 milliGRAM(s) Oral daily  atorvastatin 40 milliGRAM(s) Oral at bedtime  benzocaine/menthol Lozenge 1 Lozenge Oral once  bisacodyl 5 milliGRAM(s) Oral every 12 hours  buMETAnide Infusion 3 mG/Hr IV Continuous <Continuous>  dextrose 5%. 1000 milliLiter(s) IV Continuous <Continuous>  dextrose 5%. 1000 milliLiter(s) IV Continuous <Continuous>  dextrose 50% Injectable 25 Gram(s) IV Push once  dextrose 50% Injectable 12.5 Gram(s) IV Push once  dextrose 50% Injectable 25 Gram(s) IV Push once  dextrose Oral Gel 15 Gram(s) Oral once PRN  glucagon  Injectable 1 milliGRAM(s) IntraMuscular once  heparin   Injectable 5000 Unit(s) SubCutaneous every 8 hours  insulin glargine Injectable (LANTUS) 10 Unit(s) SubCutaneous at bedtime  insulin lispro (ADMELOG) corrective regimen sliding scale   SubCutaneous three times a day before meals  insulin lispro (ADMELOG) corrective regimen sliding scale   SubCutaneous at bedtime  linagliptin 5 milliGRAM(s) Oral daily  melatonin 3 milliGRAM(s) Oral at bedtime PRN  ondansetron Injectable 4 milliGRAM(s) IV Push every 6 hours PRN  oxyCODONE    IR 5 milliGRAM(s) Oral every 4 hours PRN  pantoprazole  Injectable 40 milliGRAM(s) IV Push daily  polyethylene glycol 3350 17 Gram(s) Oral daily  senna 2 Tablet(s) Oral at bedtime  spironolactone 25 milliGRAM(s) Oral once      Vitals:  T(F): 98.2 (02-12), Max: 98.5 (02-11)  HR: 91 (02-12) (84 - 94)  BP: 99/62 (02-12) (99/62 - 110/75)  RR: 18 (02-12)  SpO2: 98% (02-12)  I&O's Summary    11 Feb 2025 07:01  -  12 Feb 2025 07:00  --------------------------------------------------------  IN: 1170 mL / OUT: 2900 mL / NET: -1730 mL        Physical Exam:  GEN: NAD  HEENT: EOMI, clear sclera  PULM: CTA b/l, no wheeze  CV: RRR S1 S2, no murmur, no JVD  ABD: S, NT, ND  EXT: WWP, no edema  PSYCH: normal affect  SKIN: No rash                          9.8    11.44 )-----------( 324      ( 12 Feb 2025 06:51 )             28.4     02-12    138  |  92[L]  |  54[H]  ----------------------------<  84  2.8[LL]   |  27  |  2.39[H]    Ca    9.5      12 Feb 2025 06:51    TPro  8.4[H]  /  Alb  4.4  /  TBili  0.8  /  DBili  x   /  AST  24  /  ALT  17  /  AlkPhos  68  02-11

## 2025-02-12 NOTE — PROGRESS NOTE ADULT - SUBJECTIVE AND OBJECTIVE BOX
Name of Patient : TOMASZ ALBA  MRN: [id number]  Date of visit: 02-12-25      Subjective: Patient seen and examined. No new events except as noted.   LE pain   improved breathing     REVIEW OF SYSTEMS:    CONSTITUTIONAL: No weakness, fevers or chills  EYES/ENT: No visual changes;  No vertigo or throat pain   NECK: No pain or stiffness  RESPIRATORY: No cough, wheezing, hemoptysis; No shortness of breath  CARDIOVASCULAR: No chest pain or palpitations  GASTROINTESTINAL: No abdominal or epigastric pain. No nausea, vomiting, or hematemesis; No diarrhea or constipation. No melena or hematochezia.  GENITOURINARY: No dysuria, frequency or hematuria  NEUROLOGICAL: No numbness or weakness  SKIN: No itching, burning, rashes, or lesions   All other review of systems is negative unless indicated above.    MEDICATIONS:  MEDICATIONS  (STANDING):  ampicillin/sulbactam  IVPB 3 Gram(s) IV Intermittent every 12 hours  ampicillin/sulbactam  IVPB      aspirin enteric coated 81 milliGRAM(s) Oral daily  atorvastatin 40 milliGRAM(s) Oral at bedtime  benzocaine/menthol Lozenge 1 Lozenge Oral once  bisacodyl 5 milliGRAM(s) Oral every 12 hours  buMETAnide Infusion 3 mG/Hr (15 mL/Hr) IV Continuous <Continuous>  dextrose 5%. 1000 milliLiter(s) (100 mL/Hr) IV Continuous <Continuous>  dextrose 5%. 1000 milliLiter(s) (50 mL/Hr) IV Continuous <Continuous>  dextrose 50% Injectable 25 Gram(s) IV Push once  dextrose 50% Injectable 12.5 Gram(s) IV Push once  dextrose 50% Injectable 25 Gram(s) IV Push once  glucagon  Injectable 1 milliGRAM(s) IntraMuscular once  heparin   Injectable 5000 Unit(s) SubCutaneous every 8 hours  insulin glargine Injectable (LANTUS) 10 Unit(s) SubCutaneous at bedtime  insulin lispro (ADMELOG) corrective regimen sliding scale   SubCutaneous three times a day before meals  insulin lispro (ADMELOG) corrective regimen sliding scale   SubCutaneous at bedtime  linagliptin 5 milliGRAM(s) Oral daily  pantoprazole  Injectable 40 milliGRAM(s) IV Push daily  polyethylene glycol 3350 17 Gram(s) Oral daily  potassium chloride  10 mEq/100 mL IVPB 10 milliEquivalent(s) IV Intermittent every 1 hour  senna 2 Tablet(s) Oral at bedtime      PHYSICAL EXAM:  T(C): 36.8 (02-12-25 @ 13:45), Max: 36.9 (02-11-25 @ 21:18)  HR: 99 (02-12-25 @ 13:45) (84 - 99)  BP: 101/65 (02-12-25 @ 13:45) (99/62 - 110/75)  RR: 18 (02-12-25 @ 13:45) (18 - 18)  SpO2: 97% (02-12-25 @ 13:45) (97% - 99%)  Wt(kg): --  I&O's Summary    11 Feb 2025 07:01  -  12 Feb 2025 07:00  --------------------------------------------------------  IN: 1170 mL / OUT: 2900 mL / NET: -1730 mL    12 Feb 2025 07:01  -  12 Feb 2025 16:53  --------------------------------------------------------  IN: 0 mL / OUT: 450 mL / NET: -450 mL          Appearance: Normal	  HEENT:  PERRLA   Lymphatic: No lymphadenopathy   Cardiovascular: Normal S1 S2, no JVD  Respiratory: normal effort , clear  Gastrointestinal:  Soft, Non-tender  Skin: No rashes,  warm to touch  Psychiatry:  Mood & affect appropriate  Musculuskeletal: LE dressing     recent labs, Imaging and EKGs personally reviewed     02-11-25 @ 07:01  -  02-12-25 @ 07:00  --------------------------------------------------------  IN: 1170 mL / OUT: 2900 mL / NET: -1730 mL    02-12-25 @ 07:01  -  02-12-25 @ 16:53  --------------------------------------------------------  IN: 0 mL / OUT: 450 mL / NET: -450 mL                          9.8    11.44 )-----------( 324      ( 12 Feb 2025 06:51 )             28.4               02-12    140  |  93[L]  |  56[H]  ----------------------------<  161[H]  3.5   |  32[H]  |  2.50[H]    Ca    9.5      12 Feb 2025 15:43    TPro  8.4[H]  /  Alb  4.4  /  TBili  0.8  /  DBili  x   /  AST  24  /  ALT  17  /  AlkPhos  68  02-11                       Urinalysis Basic - ( 12 Feb 2025 15:43 )    Color: x / Appearance: x / SG: x / pH: x  Gluc: 161 mg/dL / Ketone: x  / Bili: x / Urobili: x   Blood: x / Protein: x / Nitrite: x   Leuk Esterase: x / RBC: x / WBC x   Sq Epi: x / Non Sq Epi: x / Bacteria: x               Hydroxychloroquine Pregnancy And Lactation Text: This medication has been shown to cause fetal harm but it isn't assigned a Pregnancy Risk Category. There are small amounts excreted in breast milk.

## 2025-02-12 NOTE — PROVIDER CONTACT NOTE (CRITICAL VALUE NOTIFICATION) - PERSON GIVING RESULT:
guanaco Travis/ lab
Chinedu Lucero/ Lab
Fartun Cruz [facility]
Thaddeus Love- riccardo
Lab

## 2025-02-12 NOTE — PROGRESS NOTE ADULT - SUBJECTIVE AND OBJECTIVE BOX
Patient is a 64y old  Female who presents with a chief complaint of Transfer for CO2 angiogram; PAD and CLTI (12 Feb 2025 06:36)       INTERVAL HPI/OVERNIGHT EVENTS:  Patient seen and evaluated at bedside.  Pt is resting comfortable in NAD. Denies N/V/F/C.    Allergies    No Known Allergies    Intolerances    Augmentin (Stomach Upset; Vomiting; Nausea)      Vital Signs Last 24 Hrs  T(C): 36.8 (12 Feb 2025 09:17), Max: 36.9 (11 Feb 2025 21:18)  T(F): 98.2 (12 Feb 2025 09:17), Max: 98.5 (11 Feb 2025 21:18)  HR: 84 (12 Feb 2025 09:17) (84 - 94)  BP: 108/67 (12 Feb 2025 09:17) (100/64 - 110/75)  BP(mean): --  RR: 18 (12 Feb 2025 09:17) (18 - 18)  SpO2: 98% (12 Feb 2025 09:17) (96% - 99%)    Parameters below as of 12 Feb 2025 09:17  Patient On (Oxygen Delivery Method): room air        LABS:                        9.8    11.44 )-----------( 324      ( 12 Feb 2025 06:51 )             28.4     02-12    138  |  92[L]  |  54[H]  ----------------------------<  84  2.8[LL]   |  27  |  2.39[H]    Ca    9.5      12 Feb 2025 06:51    TPro  8.4[H]  /  Alb  4.4  /  TBili  0.8  /  DBili  x   /  AST  24  /  ALT  17  /  AlkPhos  68  02-11      Urinalysis Basic - ( 12 Feb 2025 06:51 )    Color: x / Appearance: x / SG: x / pH: x  Gluc: 84 mg/dL / Ketone: x  / Bili: x / Urobili: x   Blood: x / Protein: x / Nitrite: x   Leuk Esterase: x / RBC: x / WBC x   Sq Epi: x / Non Sq Epi: x / Bacteria: x      CAPILLARY BLOOD GLUCOSE      POCT Blood Glucose.: 102 mg/dL (12 Feb 2025 08:27)  POCT Blood Glucose.: 106 mg/dL (11 Feb 2025 21:33)  POCT Blood Glucose.: 97 mg/dL (11 Feb 2025 17:29)  POCT Blood Glucose.: 157 mg/dL (11 Feb 2025 12:33)      Lower Extremity Physical Exam:  Vascular: DP/PT 0/4, B/L, CFT <3 seconds B/L, Temperature gradient warm to cool, B/L.   Neuro: Epicritic sensation diminished to the level of digits, B/L.  Musculoskeletal/Ortho: unremarkable   Skin: Bilateral lower extremity venous stasis wounds to dermis, mild serous drainage, no acute signs of infection. Left foot hallux distal tuft well adhered eschar, no acute signs of infection.     RADIOLOGY & ADDITIONAL TESTS:

## 2025-02-12 NOTE — PROGRESS NOTE ADULT - ASSESSMENT
64F, hx of CHF (last TTE on 1/16/25 EF 30-35%, G2DD), DM, diabetic foot ulcer with a recent admission at [facility] for CHF exacerbation 1/14-1/17 p/w worsening R. leg pain and fluid secretion, was meeting sepsis criteria with podiatry having low suspicion for right cellulitis and admitted for continued management of HF exacerbation and needing ischemic eval.     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  -Left leg Arterial Duplex: Slightly tardus parvus waveform of the left popliteal artery is noted, for which underlying disease cannot be excluded. Posterior tibial artery waveform is nonpulsatile. Anterior tibial artery tardus parvus flow is noted.   Transferred to [facility] for CO2 angiogram as per vascular surgery    #  PAD Wound of lower extremity.   ·  Plan: Podiatry following, b/l serous bullae, no concerns for infection  Found to have RLE coolness  -Right Leg Arterial Duplex: Popliteal artery is occluded with negligible flow of right trifurcation arteries.  -Left leg Arterial Duplex: Slightly tardus parvus waveform of the left popliteal artery is noted, for which underlying disease cannot be excluded. Posterior tibial artery waveform is nonpulsatile. Anterior tibial artery tardus parvus flow is noted.  -Started on heparin gtt and dobutamine gtt -Will transfer to [facility] for CO2 angiogram as per vascular surgery as not candidate for CTA due to worsening SCr  -Keep compression dressing  -LE elevation above heart level at rest.  -Transferred to [facility] for CO2 angiogram   - Overall this patient is at   intermediate  risk (for cardiac death, nonfatal myocardial infarction, and nonfatal cardiac arrest perioperatively for this intermediate  risk procedure).   No cardiac contraindications for CO2 vangiogram  There  are  no further recommendation for risk stratifying imaging/stress testing prior to planned surgery  Plan for outpatient CO2 angiogram as per Vascular  Seen by Podiatry-No acute pod intervention, local wound care only- Pod stable for discharge     # HFrEF (congestive heart failure).   ·  Plan: Hx of HF, previous admission in January, on home Lasix 40 qD, Metoprolol Succ 25 qD. Not on Entresto due to insurance issues  Last TTE: LVSF moderately decreased w/ EF 30-35 %. Moderate G2DD. Mild MR  Stress test: small-sized, moderate defect(s) in the apical wall that is predominantly fixed suggestive of an infarction with minimal marcus-infarct ischemia  Ischemic cardiomyopathy  GDMT on hold 2/2 JAMEL  Patient on Heparin gtt change to Xarelto 2.5mg BID for PAD  Has been on  for > 5 days will stop and resume on Milrinone tomorrow  Repeat TTE EF 20% with WMA  Likely Ischemic cardiomyopathy despite NST revealing fixed defect Probably Multivessel CAD  She refused cardiac cath previously discussed with her and her brother they are now agreeable  Renal follow up optimise precath    Continue Bumex 3mg/hr UOP 2100mL Lactate normal recieved Diamox   Consider hypertonic saline  +/- Milrinone gtt    LE EDEMA no DVT      # JAMEL  Creatinine Trend: 2.49<--2.42<--, 2.29<--,2.07<--2.16<-- 2.27<--2.56<--, 2.82<--, 2.98<--, 3.31<--, 2.65<--, 3.90<-- 1.17<--  Cardiorenal

## 2025-02-12 NOTE — PROVIDER CONTACT NOTE (CRITICAL VALUE NOTIFICATION) - ACTION/TREATMENT ORDERED:
Heparin infusion changed to 13ml/hr
Provider notified.  PO Potassium ordered.  Plan of care ongoing.
GERMAIN Whitt made aware. Heparin gtt rate decreased to 9cc/hr.
Hold heparin infusion for 1hr and restart at 11ml/hr
Repeat PTT on opposite arm.

## 2025-02-12 NOTE — PROVIDER CONTACT NOTE (CRITICAL VALUE NOTIFICATION) - ASSESSMENT
Pt A&Ox4, VSS. No signs of bleeding.
Pt denies any pain or distress at this time.  Remote tele monitor in place.
Pt with no s/s of bleeding noted. Per lab, PTT > 200. RN noting gauze and tape from a peripheral stick above site of heparin IV.

## 2025-02-13 LAB
ANION GAP SERPL CALC-SCNC: 14 MMOL/L — SIGNIFICANT CHANGE UP (ref 5–17)
ANION GAP SERPL CALC-SCNC: 18 MMOL/L — HIGH (ref 5–17)
BUN SERPL-MCNC: 54 MG/DL — HIGH (ref 7–23)
BUN SERPL-MCNC: 54 MG/DL — HIGH (ref 7–23)
CALCIUM SERPL-MCNC: 9.4 MG/DL — SIGNIFICANT CHANGE UP (ref 8.4–10.5)
CALCIUM SERPL-MCNC: 9.6 MG/DL — SIGNIFICANT CHANGE UP (ref 8.4–10.5)
CHLORIDE SERPL-SCNC: 88 MMOL/L — LOW (ref 96–108)
CHLORIDE SERPL-SCNC: 91 MMOL/L — LOW (ref 96–108)
CO2 SERPL-SCNC: 30 MMOL/L — SIGNIFICANT CHANGE UP (ref 22–31)
CO2 SERPL-SCNC: 31 MMOL/L — SIGNIFICANT CHANGE UP (ref 22–31)
CREAT SERPL-MCNC: 2.37 MG/DL — HIGH (ref 0.5–1.3)
CREAT SERPL-MCNC: 2.4 MG/DL — HIGH (ref 0.5–1.3)
EGFR: 22 ML/MIN/1.73M2 — LOW
GLUCOSE BLDC GLUCOMTR-MCNC: 120 MG/DL — HIGH (ref 70–99)
GLUCOSE BLDC GLUCOMTR-MCNC: 156 MG/DL — HIGH (ref 70–99)
GLUCOSE BLDC GLUCOMTR-MCNC: 176 MG/DL — HIGH (ref 70–99)
GLUCOSE BLDC GLUCOMTR-MCNC: 226 MG/DL — HIGH (ref 70–99)
GLUCOSE SERPL-MCNC: 111 MG/DL — HIGH (ref 70–99)
GLUCOSE SERPL-MCNC: 160 MG/DL — HIGH (ref 70–99)
HCT VFR BLD CALC: 27.4 % — LOW (ref 34.5–45)
HGB BLD-MCNC: 9.4 G/DL — LOW (ref 11.5–15.5)
HGB FLD-MCNC: 9.1 G/DL — LOW (ref 11.7–16.5)
MAGNESIUM SERPL-MCNC: 1.9 MG/DL — SIGNIFICANT CHANGE UP (ref 1.6–2.6)
MCHC RBC-ENTMCNC: 24.7 PG — LOW (ref 27–34)
MCHC RBC-ENTMCNC: 34.3 G/DL — SIGNIFICANT CHANGE UP (ref 32–36)
MCV RBC AUTO: 71.9 FL — LOW (ref 80–100)
NRBC BLD AUTO-RTO: 0 /100 WBCS — SIGNIFICANT CHANGE UP (ref 0–0)
OXYHGB MFR BLDMV: 50.2 % — LOW (ref 90–95)
PHOSPHATE SERPL-MCNC: 3.1 MG/DL — SIGNIFICANT CHANGE UP (ref 2.5–4.5)
PLATELET # BLD AUTO: 375 K/UL — SIGNIFICANT CHANGE UP (ref 150–400)
POTASSIUM SERPL-MCNC: 3.4 MMOL/L — LOW (ref 3.5–5.3)
POTASSIUM SERPL-MCNC: 3.7 MMOL/L — SIGNIFICANT CHANGE UP (ref 3.5–5.3)
POTASSIUM SERPL-SCNC: 3.4 MMOL/L — LOW (ref 3.5–5.3)
POTASSIUM SERPL-SCNC: 3.7 MMOL/L — SIGNIFICANT CHANGE UP (ref 3.5–5.3)
RBC # BLD: 3.81 M/UL — SIGNIFICANT CHANGE UP (ref 3.8–5.2)
RBC # FLD: 16.3 % — HIGH (ref 10.3–14.5)
SAO2 % BLD: 51.1 % — LOW (ref 60–90)
SODIUM SERPL-SCNC: 136 MMOL/L — SIGNIFICANT CHANGE UP (ref 135–145)
SODIUM SERPL-SCNC: 136 MMOL/L — SIGNIFICANT CHANGE UP (ref 135–145)
WBC # BLD: 12.45 K/UL — HIGH (ref 3.8–10.5)
WBC # FLD AUTO: 12.45 K/UL — HIGH (ref 3.8–10.5)

## 2025-02-13 PROCEDURE — 93460 R&L HRT ART/VENTRICLE ANGIO: CPT | Mod: 26

## 2025-02-13 PROCEDURE — 99152 MOD SED SAME PHYS/QHP 5/>YRS: CPT

## 2025-02-13 PROCEDURE — 99232 SBSQ HOSP IP/OBS MODERATE 35: CPT

## 2025-02-13 RX ORDER — SPIRONOLACTONE 25 MG
25 TABLET ORAL DAILY
Refills: 0 | Status: DISCONTINUED | OUTPATIENT
Start: 2025-02-13 | End: 2025-02-14

## 2025-02-13 RX ORDER — IRON SUCROSE 20 MG/ML
200 INJECTION, SOLUTION INTRAVENOUS EVERY 24 HOURS
Refills: 0 | Status: COMPLETED | OUTPATIENT
Start: 2025-02-13 | End: 2025-02-17

## 2025-02-13 RX ORDER — MILRINONE LACTATE 1 MG/ML
0.12 INJECTION, SOLUTION INTRAVENOUS
Qty: 20 | Refills: 0 | Status: DISCONTINUED | OUTPATIENT
Start: 2025-02-13 | End: 2025-02-14

## 2025-02-13 RX ORDER — SODIUM CHLORIDE 3 G/100ML
150 INJECTION, SOLUTION INTRAVENOUS ONCE
Refills: 0 | Status: COMPLETED | OUTPATIENT
Start: 2025-02-13 | End: 2025-02-13

## 2025-02-13 RX ADMIN — BUMETANIDE 15 MG/HR: 1 TABLET ORAL at 06:23

## 2025-02-13 RX ADMIN — Medication 20 MILLIEQUIVALENT(S): at 11:43

## 2025-02-13 RX ADMIN — INSULIN GLARGINE-YFGN 10 UNIT(S): 100 INJECTION, SOLUTION SUBCUTANEOUS at 21:05

## 2025-02-13 RX ADMIN — OXYCODONE HYDROCHLORIDE 5 MILLIGRAM(S): 30 TABLET ORAL at 03:19

## 2025-02-13 RX ADMIN — AMPICILLIN SODIUM AND SULBACTAM SODIUM 200 GRAM(S): 1; .5 INJECTION, POWDER, FOR SOLUTION INTRAMUSCULAR; INTRAVENOUS at 09:34

## 2025-02-13 RX ADMIN — OXYCODONE HYDROCHLORIDE 5 MILLIGRAM(S): 30 TABLET ORAL at 04:19

## 2025-02-13 RX ADMIN — OXYCODONE HYDROCHLORIDE 5 MILLIGRAM(S): 30 TABLET ORAL at 07:25

## 2025-02-13 RX ADMIN — ATORVASTATIN CALCIUM 40 MILLIGRAM(S): 80 TABLET, FILM COATED ORAL at 21:03

## 2025-02-13 RX ADMIN — HEPARIN SODIUM 5000 UNIT(S): 1000 INJECTION INTRAVENOUS; SUBCUTANEOUS at 21:03

## 2025-02-13 RX ADMIN — BUMETANIDE 15 MG/HR: 1 TABLET ORAL at 21:13

## 2025-02-13 RX ADMIN — OXYCODONE HYDROCHLORIDE 5 MILLIGRAM(S): 30 TABLET ORAL at 18:22

## 2025-02-13 RX ADMIN — Medication 20 MILLIEQUIVALENT(S): at 09:34

## 2025-02-13 RX ADMIN — AMPICILLIN SODIUM AND SULBACTAM SODIUM 200 GRAM(S): 1; .5 INJECTION, POWDER, FOR SOLUTION INTRAMUSCULAR; INTRAVENOUS at 21:04

## 2025-02-13 RX ADMIN — Medication 5 MILLIGRAM(S): at 06:23

## 2025-02-13 RX ADMIN — OXYCODONE HYDROCHLORIDE 5 MILLIGRAM(S): 30 TABLET ORAL at 23:44

## 2025-02-13 RX ADMIN — MILRINONE LACTATE 2.7 MICROGRAM(S)/KG/MIN: 1 INJECTION, SOLUTION INTRAVENOUS at 21:13

## 2025-02-13 RX ADMIN — IRON SUCROSE 110 MILLIGRAM(S): 20 INJECTION, SOLUTION INTRAVENOUS at 11:51

## 2025-02-13 RX ADMIN — SODIUM CHLORIDE 300 MILLILITER(S): 3 INJECTION, SOLUTION INTRAVENOUS at 13:17

## 2025-02-13 RX ADMIN — HEPARIN SODIUM 5000 UNIT(S): 1000 INJECTION INTRAVENOUS; SUBCUTANEOUS at 06:23

## 2025-02-13 RX ADMIN — LINAGLIPTIN 5 MILLIGRAM(S): 5 TABLET, FILM COATED ORAL at 11:43

## 2025-02-13 RX ADMIN — HEPARIN SODIUM 5000 UNIT(S): 1000 INJECTION INTRAVENOUS; SUBCUTANEOUS at 13:20

## 2025-02-13 RX ADMIN — Medication 2 TABLET(S): at 21:03

## 2025-02-13 RX ADMIN — OXYCODONE HYDROCHLORIDE 5 MILLIGRAM(S): 30 TABLET ORAL at 17:22

## 2025-02-13 RX ADMIN — Medication 81 MILLIGRAM(S): at 11:43

## 2025-02-13 RX ADMIN — Medication 25 MILLIGRAM(S): at 11:42

## 2025-02-13 RX ADMIN — Medication 3 MILLIGRAM(S): at 21:03

## 2025-02-13 RX ADMIN — Medication 40 MILLIGRAM(S): at 11:43

## 2025-02-13 RX ADMIN — OXYCODONE HYDROCHLORIDE 5 MILLIGRAM(S): 30 TABLET ORAL at 12:42

## 2025-02-13 RX ADMIN — OXYCODONE HYDROCHLORIDE 5 MILLIGRAM(S): 30 TABLET ORAL at 11:42

## 2025-02-13 RX ADMIN — MILRINONE LACTATE 2.7 MICROGRAM(S)/KG/MIN: 1 INJECTION, SOLUTION INTRAVENOUS at 17:22

## 2025-02-13 RX ADMIN — OXYCODONE HYDROCHLORIDE 5 MILLIGRAM(S): 30 TABLET ORAL at 08:25

## 2025-02-13 RX ADMIN — Medication 650 MILLIGRAM(S): at 15:53

## 2025-02-13 RX ADMIN — INSULIN LISPRO 1: 100 INJECTION, SOLUTION INTRAVENOUS; SUBCUTANEOUS at 18:15

## 2025-02-13 NOTE — PROGRESS NOTE ADULT - ASSESSMENT
63 YO F with PMHx of HFrEF 30-35 and grade 2 ddfxn (last TTE on 01/16/25), DM2, and diabetic foot ulcer. [facility] Patient now represents to OSH and ultimately to [facility] as a transfer for worsening right leg pain and fluid secretion. As per documentation, patient was reported to have severe depletion of RLE blood flow beyond the popliteal vessel at knee and similar findings in the left. Patient was transferred to [facility] for CO2 angiogram with Dr. Baltazar. Of note, patient also with reported visual disturbance for which patient was seen and evaluated by opthalmology. Internal Medicine has been consulted on Ms. Kirby's care for medical management.     # ADHF  - [facility]  - TTE 1/16 with EF 30-35 with moderately reduced LVSF and grade 2 ddfxn, normal RVSF , trace TR/ NV, and trace pericardial effusion  - NST abnormal with small-sized, moderate defect in apical wall that is predominantly fixed suggestive of an infarction with minimal marcus-infarct ischemia. Post stress LVEF 25.  - CT Chest with small BL pleural effusions and small pericardial effusion.  - TTE w/ EF of 20%, Regional WMA, Multiple segmental abnormalities exist, Reduced RVSF --> Planned for cath once cleared by Renal - Cath today   - GDMT as per Cardio   - Diuresis as per HF/Cardio/Renal --> Currently on Bumex gtt and Aldactone. Was on Dobutamine per cardio   - EP eval for AICD appreciated --> per EP, trial GDMT as per cardio, pt on Bumex and dobutamine and at home on home Lasix 40 qD, Metoprolol Succ 25 qD. Not on Entresto due to insurance issues --> Per EP, pt needs to be on GDMT for 3 months before AICD placement and should be stabilized off of dobutamine gtt prior to EP evaluation. Re-consult when criteria are met.  - Monitor I and O, diuresis per Cardio/ Renal    - GDMT as per Cardio  - Serial CXR   - Monitor volume status   - Check daily weights    - Monitor on telemetry; Monitor electrolytes   - Cardio, HF, Renal, and EP evals appreciated; F/u recs     # BL LE Edema likely in setting of ADHF  - Diuresis as per Cardio and Renal   - BL LE elevation and compression  - LE Duplex neg   - Monitor for now  - Podiatry and Vascular evals appreciated; F/u recs    # Pericardial effusion likely in setting ADHF  - TTE with trace pericardial effusion   - CT Chest with small pericardial effusion   - Denies chest pain, palpitations, chest tightness or discomfort, shortness of breath or dyspnea   - Repeat TTE in 1 month for further evaluation   - Diuresis per cardio/ renal.  - Monitor on telemetry  - Cardio following    # Pleural effusion likely in setting ADHF  - CT Chest with small BL pleural effusions  - On RA with no respiratory complaints at present  - Monitor O2 saturation  - Supplement to maintain > 90%   - Diuresis per cardio/ renal.     # JAMEL with Hyponatremia and Metabolic Acidosis   - As per OSH documentation, JAMEL was thought to be second to Unasyn/ Bumex / Entresto use and Hypotension   - US RENAL with no hydronephrosis  - CRE improving slightly with diuresis/ dobutamine and likely cardiorenal induced with low flow state in ADHF  - Avoid nephrotoxic agents  - Monitor Cr and daily BMP - up-trend in Cr. Monitor   - Avoid overcorrection of Na > 6-8 mEq in 24 hours   - Albumin per renal   - Renal eval appreciated    # Leukocytosis likely in setting of BL LE ulcers with pop occlusion   - BCx negative   - UCx with probable contamination   - On unasyn for ABX. Frequency increased to Q12 as per Renal   - Trend CBC, temp curve, VS and adjust as tolerated    # Foot ulcers with worsening RLE pain second to pop artery occlusion +/- diabetic ulcers   - RLE Arterial Duplex with popliteal artery occlusion   - LLE Arterial Duplex with underlying disease cannot be excluded   - Patient transferred to [facility] for CO2 angiogram, however given ADHF currently not medically optimized and high risk. Plan to continue on diuresis and dobutamine as above   - Was on Heparin GTT for now, now on Eliquis 2.5 PA   - On Unasyn for ABX   - Continue on Lipitor  - Monitor PLT and HH while on AC   - Vascular following  - Wound care called for dressing recs     # Tachycardia likely pain related   - On Toprol and improved  - Continue to monitor on tele   - Cardio eval appreciated    # Visual Disturbance  - CT Head w/ old infarcts  - On ASA and Statin   - Opthalmology eval appreciated    # Indigestion and Constipation   - PPI, miralax and senna continued  - MOnitor BMs    # Anemia likely mixed AOCD vs iron deficiency   - HH stable in 9s on heparin GGT   - Consider iron tabs when optimized   - Monitor HH   - Transfuse for Hgb < 8  - Maintain active T/S    # Diabetes Mellitus A1C 11.6   - Continue on lantus 18 with lispro 2 and ISS   - Endocrine following    # HLD and HLD  - Continue on lipitor and toprol  - Monitor BP    # DISPO TBD      Discussed with Attending and ACP         63 YO F with PMHx of HFrEF 30-35 and grade 2 ddfxn (last TTE on 01/16/25), DM2, and diabetic foot ulcer. Recent admission at [facility] for ADHF. Patient now represents to OSH and ultimately to [facility] as a transfer for worsening right leg pain and fluid secretion. As per documentation, patient was reported to have severe depletion of RLE blood flow beyond the popliteal vessel at knee and similar findings in the left. Patient was transferred to [facility] for CO2 angiogram with Dr. Baltazar. Of note, patient also with reported visual disturbance for which patient was seen and evaluated by opthalmology. Internal Medicine has been consulted on Ms. Kirby's care for medical management.     # ADHF  - Recent admission at [facility] for ADHF  - TTE 1/16 with EF 30-35 with moderately reduced LVSF and grade 2 ddfxn, normal RVSF , trace TR/ [place], and trace pericardial effusion  - NST abnormal with small-sized, moderate defect in apical wall that is predominantly fixed suggestive of an infarction with minimal marcus-infarct ischemia. Post stress LVEF 25.  - CT Chest with small BL pleural effusions and small pericardial effusion.  - TTE w/ EF of 20%, Regional WMA, Multiple segmental abnormalities exist, Reduced RVSF --> Planned for cath once cleared by Renal - Cath today   - GDMT as per Cardio   - Diuresis as per HF/Cardio/Renal --> Currently on Bumex gtt and Aldactone. Was on Dobutamine per cardio   - EP eval for AICD appreciated --> per EP, trial GDMT as per cardio, pt on Bumex and dobutamine and at home on home Lasix 40 qD, Metoprolol Succ 25 qD. Not on Entresto due to insurance issues --> Per EP, pt needs to be on GDMT for 3 months before AICD placement and should be stabilized off of dobutamine gtt prior to EP evaluation. Re-consult when criteria are met.  - Monitor I and O, diuresis per Cardio/ Renal    - GDMT as per Cardio  - Serial CXR   - Monitor volume status   - Check daily weights    - Monitor on telemetry; Monitor electrolytes   - Cardio, HF, Renal, and EP evals appreciated; F/u recs     # BL LE Edema likely in setting of ADHF  - Diuresis as per Cardio and Renal   - BL LE elevation and compression  - LE Duplex neg   - Monitor for now  - Podiatry and Vascular evals appreciated; F/u recs    # Pericardial effusion likely in setting ADHF  - TTE with trace pericardial effusion   - CT Chest with small pericardial effusion   - Denies chest pain, palpitations, chest tightness or discomfort, shortness of breath or dyspnea   - Repeat TTE in 1 month for further evaluation   - Diuresis per cardio/ renal.  - Monitor on telemetry  - Cardio following    # Pleural effusion likely in setting ADHF  - CT Chest with small BL pleural effusions  - On RA with no respiratory complaints at present  - Monitor O2 saturation  - Supplement to maintain > 90%   - Diuresis per cardio/ renal.     # JAMEL with Hyponatremia and Metabolic Acidosis   - As per OSH documentation, JAMEL was thought to be second to Unasyn/ Bumex / Entresto use and Hypotension   - US RENAL with no hydronephrosis  - CRE improving slightly with diuresis/ dobutamine and likely cardiorenal induced with low flow state in ADHF  - Avoid nephrotoxic agents  - Monitor Cr and daily BMP - up-trend in Cr. Monitor   - Avoid overcorrection of Na > 6-8 mEq in 24 hours   - Albumin per renal   - Renal eval appreciated    # Leukocytosis likely in setting of BL LE ulcers with pop occlusion   - BCx negative   - UCx with probable contamination   - On unasyn for ABX. Frequency increased to Q12 as per Renal   - Leukocytosis up-trending. monitor closely. If febrile check pan cultures   - Trend CBC, temp curve, VS and adjust as tolerated    # Foot ulcers with worsening RLE pain second to pop artery occlusion +/- diabetic ulcers   - RLE Arterial Duplex with popliteal artery occlusion   - LLE Arterial Duplex with underlying disease cannot be excluded   - Patient transferred to [facility] for CO2 angiogram, however given ADHF currently not medically optimized and high risk. Plan to continue on diuresis and dobutamine as above   - Was on Heparin GTT for now, now on Eliquis 2.5 PA   - On Unasyn for ABX   - Continue on Lipitor  - Monitor PLT and HH while on AC   - Vascular following  - Wound care called for dressing recs     # Tachycardia likely pain related   - On Toprol and improved  - Continue to monitor on tele   - Cardio eval appreciated    # Visual Disturbance  - CT Head w/ old infarcts  - On ASA and Statin   - Opthalmology eval appreciated    # Indigestion and Constipation   - PPI, miralax and senna continued  - MOnitor BMs    # Anemia likely mixed AOCD vs iron deficiency   - HH stable in 9s on heparin GGT   - Consider iron tabs when optimized   - Monitor HH   - Transfuse for Hgb < 8  - Maintain active T/S    # Diabetes Mellitus A1C 11.6   - Continue on lantus 18 with lispro 2 and ISS   - Endocrine following    # HLD and HLD  - Continue on lipitor and toprol  - Monitor BP    # DISPO TBD      Discussed with Attending and ACP

## 2025-02-13 NOTE — PROGRESS NOTE ADULT - SUBJECTIVE AND OBJECTIVE BOX
MR#[id number]  PATIENT NAME:COLE ALBA    DATE OF SERVICE: 02-13-25 @ 07:28  Patient was seen and examined by Yordy Gilmore MD on    02-13-25 @ 07:28 .  Interim events noted.Consultant notes ,Labs,Telemetry reviewed by me       HOSPITAL COURSE: 64F, hx of CHF (last TTE on 1/16/25 EF 30-35%, G2DD), DM, diabetic foot ulcer with a recent admission at [facility] for CHF exacerbation presented as a transfer for worsening R. leg pain and fluid secretion, On non-invasive imaging demonstrating severe depletion of RLE blood flow beyond the popliteal vessel at knee and similar findings in L. Was transferred to [facility] for CO2 angiogram with Dr. Baltazar. (29 Jan 2025 20:05)      Transferred from [facility]-TTE on previous admission: LVSF moderately decreased w/EF 30-35%. Moderate G2DD. Mild MR. Ischemic evaluation was recommended for which patient undergone stress test which revealed a small-sized moderate defect in the apical wall that is predominantly fixed suggestive of an infarction with minimal marcus-infarct ischemia. 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 Left leg Arterial Duplex: Slightly tardus parvus waveform of the left popliteal artery is noted, for which underlying disease cannot be excluded. Posterior tibial artery waveform is nonpulsatile. Anterior tibial artery tardus parvus flow is noted. Patient started on Heparin and Dobutamine drip with transfer to [facility] for further management.        INTERIM EVENTS:Patient seen at bedside ,interim events noted.  Awake alert remains on Bumex gtt and  gtt at 2.5mcg CO2 Angiogram postponed to next week-she is still orthopneac  02/08-on  5mcg and Bumex gtt   02/10-OOB still orthopneac [id number] LE weakness and swelling  02/11-c/o LE pain not dyspneac  held on Bumex 3 mg/Hr  02/12-Awake seen by HF   02/13-Remains on Bumex 3mg/Hr for possible LHC/RHC    PMH -reviewed admission note, no change since admission  HEART FAILURE: Acute[x ]Chronic[x ] Systolic[x ] Diastolic[ ] Combined Systolic and Diastolic[ ]  CAD[ ] CABG[ ] PCI[ ]  DEVICES[ ] PPM[ ] ICD[ ] ILR[ ]  ATRIAL FIBRILLATION[ ] Paroxysmal[ ] Permanent[ ] CHADS2-[  ]  JAMEL[ ] CKD1[ ] CKD2[ ] CKD3[ ] CKD4[ ] ESRD[ ]  COPD[ ] HTN[ ]   DM[ ] Type1[ ] Type 2[ ]   CVA[ ] Paresis[ ]    AMBULATION: Assisted[ ] Cane/walker[ ] Independent[ x]            MEDICATIONS  (STANDING):  ampicillin/sulbactam  IVPB 3 Gram(s) IV Intermittent every 12 hours  ampicillin/sulbactam  IVPB      aspirin enteric coated 81 milliGRAM(s) Oral daily  atorvastatin 40 milliGRAM(s) Oral at bedtime  benzocaine/menthol Lozenge 1 Lozenge Oral once  bisacodyl 5 milliGRAM(s) Oral every 12 hours  buMETAnide Infusion 3 mG/Hr (15 mL/Hr) IV Continuous <Continuous>  heparin   Injectable 5000 Unit(s) SubCutaneous every 8 hours  insulin glargine Injectable (LANTUS) 10 Unit(s) SubCutaneous at bedtime  insulin lispro (ADMELOG) corrective regimen sliding scale   SubCutaneous at bedtime  insulin lispro (ADMELOG) corrective regimen sliding scale   SubCutaneous three times a day before meals  linagliptin 5 milliGRAM(s) Oral daily  pantoprazole  Injectable 40 milliGRAM(s) IV Push daily  polyethylene glycol 3350 17 Gram(s) Oral daily  senna 2 Tablet(s) Oral at bedtime    MEDICATIONS  (PRN):  acetaminophen     Tablet .. 650 milliGRAM(s) Oral every 6 hours PRN Mild Pain (1 - 3)  dextrose Oral Gel 15 Gram(s) Oral once PRN Blood Glucose LESS THAN 70 milliGRAM(s)/deciliter  melatonin 3 milliGRAM(s) Oral at bedtime PRN Insomnia  ondansetron Injectable 4 milliGRAM(s) IV Push every 6 hours PRN Nausea and/or Vomiting  oxyCODONE    IR 5 milliGRAM(s) Oral every 4 hours PRN Severe Pain (7 - 10)            REVIEW OF SYSTEMS:  Constitutional: [ ] fever, [ ]weight loss,  [x ]fatigue [ ]weight gain  Eyes: [ ] visual changes  Respiratory: [x ]shortness of breath;  [ ] cough, [ ]wheezing, [ ]chills, [ ]hemoptysis  Cardiovascular: [ ] chest pain, [ ]palpitations, [ ]dizziness,  [ ]leg swelling[ ]orthopnea[ ]PND  Gastrointestinal: [ ] abdominal pain, [ ]nausea, [ ]vomiting,  [ ]diarrhea [ ]Constipation [ ]Melena  Genitourinary: [ ] dysuria, [ ] hematuria [ ][place]  Neurologic: [ ] headaches [ ] tremors[ ]weakness [ ]Paralysis Right[ ] Left[ ]  Skin: [ ] itching, [ ]burning, [ ] rashes  Endocrine: [ ] heat or cold intolerance  Musculoskeletal: [ ] joint pain or swelling; [ ] muscle, back, or extremity pain  Psychiatric: [ ] depression, [ ]anxiety, [ ]mood swings, or [ ]difficulty sleeping  Hematologic: [ ] easy bruising, [ ] bleeding gums    [ ] All remaining systems negative except as per above.   [ ]Unable to obtain.  [x] No change in ROS since admission      Vital Signs Last 24 Hrs  T(C): 36.9 (13 Feb 2025 06:20), Max: 36.9 (13 Feb 2025 06:20)  T(F): 98.5 (13 Feb 2025 06:20), Max: 98.5 (13 Feb 2025 06:20)  HR: 85 (13 Feb 2025 06:20) (84 - 99)  BP: 110/69 (13 Feb 2025 06:20) (99/62 - 110/69)  RR: 18 (13 Feb 2025 06:20) (18 - 18)  SpO2: 98% (13 Feb 2025 06:20) (97% - 98%)    Parameters below as of 13 Feb 2025 06:20  Patient On (Oxygen Delivery Method): room air      I&O's Summary    12 Feb 2025 07:01  -  13 Feb 2025 07:00  --------------------------------------------------------  IN: 1075 mL / OUT: 2250 mL / NET: -1175 mL        PHYSICAL EXAM:  General: No acute distress BMI-26  HEENT: EOMI, PERRL  Neck: Supple, [ ] JVD  Lungs: Equal air entry bilaterally; [ ] rales [ ] wheezing [ ] rhonchi  Heart: Regular rate and rhythm; [x ] murmur   2/6 [ x] systolic [ ] diastolic [ ] radiation[ ] rubs [ ]  gallops  Abdomen: Nontender, bowel sounds present  Extremities: No clubbing, cyanosis, [xx ] edema [ ]Pulses  equal and intact  Nervous system:  Alert & Oriented X3, no focal deficits  Psychiatric: Normal affect  Skin: No rashes or lesions    LABS:  02-12    140  |  93[L]  |  56[H]  ----------------------------<  161[H]  3.5   |  32[H]  |  2.50[H]    Ca    9.5      12 Feb 2025 15:43    TPro  8.4[H]  /  Alb  4.4  /  TBili  0.8  /  DBili  x   /  AST  24  /  ALT  17  /  AlkPhos  68  02-11    Creatinine Trend: 2.50<--, 2.39<--, 2.49<--, 2.42<--, 2.29<--, 2.07<--                        9.8    11.44 )-----------( 324      ( 12 Feb 2025 06:51 )             28.4       Lactate, Blood: 1.5 <--1.5 <--1.6  mmol/L       VA Duplex Lower Ext Vein Scan, Bilat (02.10.25 @ 17:42) >  IMPRESSION: No evidence of bilateral DVT within the imaged veins.       TTE W or WO Ultrasound Enhancing Agent (02.10.25 @ 12:09) >  CONCLUSIONS:      1. Left ventricular cavity is mildly dilated. Left ventricular systolic function is severely decreased with an ejection fraction of 20 % by Winchester's method of disks. Regional wall motion abnormalities present.   2. Multiple segmental abnormalities exist. See findings.   The entire septum, entire apex, entire inferior wall, mid inferolateral segment, and mid anterolateral segment are hypokinetic. All remaining scored segments are normal.     3. Reduced right ventricular systolic function.   4. No pericardial effusion seen.   5. Compared to the transthoracic echocardiogram performed on 1/16/2025, Worsening of the left ventricular function.        VA Duplex Lower Ext Vein Scan, Bilat (02.10.25 @ 17:42) >  IMPRESSION: No evidence of bilateral DVT within the imaged veins.        Nuclear Stress Test-Pharmacologic.. (01.24.25 @ 10:31) >  Conclusions:   1. Myocardial Perfusion: Abnormal.   2. Qualitative Perfusion:      - small-sized, moderate defect(s) in the apical wall that is predominantly fixed suggestive of an infarction with minimal marcus-infarct ischemia.   3. The post stress left ventricular EF is 25 %. The stress end diastolic volume is 118 ml.   4. Results D/Wcardiologist Dr. Gilmore at 18:31

## 2025-02-13 NOTE — PROGRESS NOTE ADULT - SUBJECTIVE AND OBJECTIVE BOX
Name of Patient : TOMASZ ALBA  MRN: [id number]  Date of visit: 02-13-25 @ 15:01      Subjective: Patient seen and examined. No new events except as noted.   Patient seen earlier this AM. Sitting OOB TC. Offers no new complaints. Reports feeling better. Breathing is improving   For Cath today     REVIEW OF SYSTEMS:    CONSTITUTIONAL: Generalized weakness   EYES/ENT: No visual changes;  No vertigo or throat pain   NECK: No pain or stiffness  RESPIRATORY: No cough, wheezing, hemoptysis; No shortness of breath  CARDIOVASCULAR: No chest pain or palpitations  GASTROINTESTINAL: No abdominal or epigastric pain. No nausea, vomiting   GENITOURINARY: No dysuria, frequency or hematuria  NEUROLOGICAL: No numbness or weakness  SKIN: + B/L LE Foot wounds, wrapped in dressing   All other review of systems is negative unless indicated above.    MEDICATIONS:  MEDICATIONS  (STANDING):  ampicillin/sulbactam  IVPB 3 Gram(s) IV Intermittent every 12 hours  ampicillin/sulbactam  IVPB      aspirin enteric coated 81 milliGRAM(s) Oral daily  atorvastatin 40 milliGRAM(s) Oral at bedtime  benzocaine/menthol Lozenge 1 Lozenge Oral once  bisacodyl 5 milliGRAM(s) Oral every 12 hours  buMETAnide Infusion 3 mG/Hr (15 mL/Hr) IV Continuous <Continuous>  dextrose 5%. 1000 milliLiter(s) (100 mL/Hr) IV Continuous <Continuous>  dextrose 5%. 1000 milliLiter(s) (50 mL/Hr) IV Continuous <Continuous>  dextrose 50% Injectable 25 Gram(s) IV Push once  dextrose 50% Injectable 12.5 Gram(s) IV Push once  dextrose 50% Injectable 25 Gram(s) IV Push once  glucagon  Injectable 1 milliGRAM(s) IntraMuscular once  heparin   Injectable 5000 Unit(s) SubCutaneous every 8 hours  insulin glargine Injectable (LANTUS) 10 Unit(s) SubCutaneous at bedtime  insulin lispro (ADMELOG) corrective regimen sliding scale   SubCutaneous three times a day before meals  insulin lispro (ADMELOG) corrective regimen sliding scale   SubCutaneous at bedtime  iron sucrose IVPB 200 milliGRAM(s) IV Intermittent every 24 hours  linagliptin 5 milliGRAM(s) Oral daily  pantoprazole  Injectable 40 milliGRAM(s) IV Push daily  polyethylene glycol 3350 17 Gram(s) Oral daily  senna 2 Tablet(s) Oral at bedtime  spironolactone 25 milliGRAM(s) Oral daily      PHYSICAL EXAM:  T(C): 36.7 (02-13-25 @ 13:38), Max: 36.9 (02-13-25 @ 06:20)  HR: 96 (02-13-25 @ 13:38) (85 - 97)  BP: 110/65 (02-13-25 @ 13:38) (92/50 - 110/69)  RR: 17 (02-13-25 @ 13:38) (16 - 18)  SpO2: 98% (02-13-25 @ 13:38) (95% - 98%)  Wt(kg): --  I&O's Summary    12 Feb 2025 07:01  -  13 Feb 2025 07:00  --------------------------------------------------------  IN: 1075 mL / OUT: 2250 mL / NET: -1175 mL    13 Feb 2025 07:01  -  13 Feb 2025 15:01  --------------------------------------------------------  IN: 120 mL / OUT: 50 mL / NET: 70 mL      Height (cm): 162.6 (02-13 @ 13:28)  Weight (kg): 72.1 (02-13 @ 13:28)  BMI (kg/m2): 27.3 (02-13 @ 13:28)  BSA (m2): 1.77 (02-13 @ 13:28)    Appearance: Awake, Sitting OOB TC   HEENT:  Eyes are open   Lymphatic: No lymphadenopathy grossly   Cardiovascular: Normal    Respiratory: normal effort , clear  Gastrointestinal:  Soft, Non-tender  Skin: No rashes,  warm to touch  Psychiatry:  Mood & affect appropriate  Musculoskeletal: B/L LE Foot wounds; Wrapped in dressing; + Edema        02-12-25 @ 07:01  -  02-13-25 @ 07:00  --------------------------------------------------------  IN: 1075 mL / OUT: 2250 mL / NET: -1175 mL    02-13-25 @ 07:01  -  02-13-25 @ 15:01  --------------------------------------------------------  IN: 120 mL / OUT: 50 mL / NET: 70 mL                              9.4    12.45 )-----------( 375      ( 13 Feb 2025 07:28 )             27.4               02-13    136  |  88[L]  |  54[H]  ----------------------------<  111[H]  3.4[L]   |  30  |  2.40[H]    Ca    9.6      13 Feb 2025 07:28  Phos  3.1     02-13  Mg     1.9     02-13    TPro  8.4[H]  /  Alb  4.4  /  TBili  0.8  /  DBili  x   /  AST  24  /  ALT  17  /  AlkPhos  68  02-11                       Urinalysis Basic - ( 13 Feb 2025 07:28 )    Color: x / Appearance: x / SG: x / pH: x  Gluc: 111 mg/dL / Ketone: x  / Bili: x / Urobili: x   Blood: x / Protein: x / Nitrite: x   Leuk Esterase: x / RBC: x / WBC x   Sq Epi: x / Non Sq Epi: x / Bacteria: x        < from: TTE W or WO Ultrasound Enhancing Agent (02.10.25 @ 12:09) >     CONCLUSIONS:      1. Left ventricular cavity is mildly dilated. Left ventricular systolic function is severely decreased with an ejection fraction of 20 % by Winchester's method of disks. Regional wall motion abnormalities present.   2. Multiple segmental abnormalities exist. See findings.   3. Reduced right ventricular systolic function.   4. No pericardial effusion seen.   5. Compared to the transthoracic echocardiogram performed on 1/16/2025, Worsening of the left ventricular function.    < end of copied text >      < from: VA Duplex Lower Ext Vein Scan, Bilat (02.10.25 @ 17:42) >    IMPRESSION: No evidence of bilateral DVT within the imaged veins.    < end of copied text >     no

## 2025-02-13 NOTE — PROGRESS NOTE ADULT - SUBJECTIVE AND OBJECTIVE BOX
Patient seen and examined at bedside.    Overnight Events: NAEON    REVIEW OF SYSTEMS:  CONSTITUTIONAL: No weakness, fevers or chills  EYES/ENT: No visual changes;  No dysphagia  NECK: No pain or stiffness  RESPIRATORY: No cough, wheezing, hemoptysis; No shortness of breath  CARDIOVASCULAR: No chest pain or palpitations; No lower extremity edema  GASTROINTESTINAL: No abdominal or epigastric pain. No nausea, vomiting, or hematemesis; No diarrhea or constipation. No melena or hematochezia.  BACK: No back pain  GENITOURINARY: No dysuria, frequency or hematuria  NEUROLOGICAL: No numbness or weakness  SKIN: No itching, burning, rashes, or lesions   All other review of systems is negative unless indicated above.            Current Meds:  acetaminophen     Tablet .. 650 milliGRAM(s) Oral every 6 hours PRN  ampicillin/sulbactam  IVPB 3 Gram(s) IV Intermittent every 12 hours  ampicillin/sulbactam  IVPB      aspirin enteric coated 81 milliGRAM(s) Oral daily  atorvastatin 40 milliGRAM(s) Oral at bedtime  benzocaine/menthol Lozenge 1 Lozenge Oral once  bisacodyl 5 milliGRAM(s) Oral every 12 hours  buMETAnide Infusion 3 mG/Hr IV Continuous <Continuous>  dextrose 5%. 1000 milliLiter(s) IV Continuous <Continuous>  dextrose 5%. 1000 milliLiter(s) IV Continuous <Continuous>  dextrose 50% Injectable 25 Gram(s) IV Push once  dextrose 50% Injectable 12.5 Gram(s) IV Push once  dextrose 50% Injectable 25 Gram(s) IV Push once  dextrose Oral Gel 15 Gram(s) Oral once PRN  glucagon  Injectable 1 milliGRAM(s) IntraMuscular once  heparin   Injectable 5000 Unit(s) SubCutaneous every 8 hours  insulin glargine Injectable (LANTUS) 10 Unit(s) SubCutaneous at bedtime  insulin lispro (ADMELOG) corrective regimen sliding scale   SubCutaneous at bedtime  insulin lispro (ADMELOG) corrective regimen sliding scale   SubCutaneous three times a day before meals  linagliptin 5 milliGRAM(s) Oral daily  melatonin 3 milliGRAM(s) Oral at bedtime PRN  ondansetron Injectable 4 milliGRAM(s) IV Push every 6 hours PRN  oxyCODONE    IR 5 milliGRAM(s) Oral every 4 hours PRN  pantoprazole  Injectable 40 milliGRAM(s) IV Push daily  polyethylene glycol 3350 17 Gram(s) Oral daily  senna 2 Tablet(s) Oral at bedtime      Vitals:  T(F): 98.5 (02-13), Max: 98.5 (02-13)  HR: 85 (02-13) (84 - 99)  BP: 110/69 (02-13) (99/62 - 110/69)  RR: 18 (02-13)  SpO2: 98% (02-13)  I&O's Summary    12 Feb 2025 07:01  -  13 Feb 2025 07:00  --------------------------------------------------------  IN: 1075 mL / OUT: 2250 mL / NET: -1175 mL        Physical Exam:  GEN: NAD  HEENT: EOMI, clear sclera  PULM: CTA b/l, no wheeze  CV: RRR S1 S2, no murmur, no JVD  ABD: S, NT, ND  EXT: WWP, no edema  PSYCH: normal affect  SKIN: No rash                          9.4    12.45 )-----------( 375      ( 13 Feb 2025 07:28 )             27.4     02-13    136  |  88[L]  |  54[H]  ----------------------------<  111[H]  3.4[L]   |  30  |  2.40[H]    Ca    9.6      13 Feb 2025 07:28  Phos  3.1     02-13  Mg     1.9     02-13    TPro  8.4[H]  /  Alb  4.4  /  TBili  0.8  /  DBili  x   /  AST  24  /  ALT  17  /  AlkPhos  68  02-11

## 2025-02-13 NOTE — PROGRESS NOTE ADULT - ASSESSMENT
64F, hx of HFrEF (previously 30-35%, now 20%), never had LHC, DM, diabetic foot ulcer, severe PAD b/l, presenting initially for vascular angiogram, found to be in ADHF with volume overload. No lactate or other signs of poor perfusion other than JAMEL which initially improved, slightly uptrending last few days which could be cardiorenal. Will need intensification of diuretic regimen with plan for eventual LHC to evaluate for ischemic HF.     Cardiac testing:  TTE 2/10/25: EF 20%, reduced RVSF  TTE 1/16/25: EF 30-35%, grade 2 DD  NST 1/24/25: small moderate defect in apical wall that is predominantly fixed    Home cardiac meds: toprol 25      #HFrEF, likely ischemic  -c/w bumex gtt 3/hr, with diamox 500mg IV QD  -replete K>4, Mg>2  -c/w spironolactone 25mg  -daily weights, goal IO -2L    #CAD  -c/w ASA  -plan for eventual LHC/RHC once creatinine downtrends and patient able to lay flat    #PAD  -vascular deferring CO2 angiogram to outpatient   64F, hx of HFrEF (previously 30-35%, now 20%), never had LHC, DM, diabetic foot ulcer, severe PAD b/l, presenting initially for vascular angiogram, found to be in ADHF with volume overload. No lactate or other signs of poor perfusion other than JAMEL which initially improved, slightly uptrending last few days which could be cardiorenal. Will need intensification of diuretic regimen with plan for eventual LHC to evaluate for ischemic HF.     Cardiac testing:  TTE 2/10/25: EF 20%, reduced RVSF  TTE 1/16/25: EF 30-35%, grade 2 DD  NST 1/24/25: small moderate defect in apical wall that is predominantly fixed    Home cardiac meds: toprol 25      #HFrEF, likely ischemic  -c/w bumex gtt 3/hr  -give hypertonic saline 3% 150cc and metolazone 5  -start iron sucrose 200mg IV q5 days  -replete K>4, Mg>2  -c/w spironolactone 25mg  -daily weights, goal IO -2L    #CAD  -c/w ASA  -plan for eventual LHC/RHC once creatinine downtrends and patient able to lay flat    #PAD  -vascular deferring CO2 angiogram to outpatient

## 2025-02-13 NOTE — PROGRESS NOTE ADULT - ASSESSMENT
64y Female with history of CHF presents as a transfer for LE CO2 angiogram. Nephrology consulted for elevated Scr.    1) JAMEL: likely due to ATN in addition to CRS. Scr relatively stable and near alvaro during admission. Continue with IV diuresis and consider milrinone gtt pending results of RHC today. UA relatively bland. FeUrea low consistent with low flow state. Renal US unremarkable. Bladder scan on 2/3 negative. Avoid nephrotoxins.    2) HTN: BP low normal. Monitor off anti-hypertensive medications.    3) LE edema: Not secondary to nephrotic syndrome given subnephrotic range proteinuria. Continue with bumex gtt @ 3 mg/hour and aldactone 25 mg daily. Agree with HTS. Consider milrinone gtt pending results of RHC. TTE with severely decreased LVSF. Monitor UO.    4) Hyperphosphatemia: Resolved. Continue with low phosphorus diet.         [facility] NEPHROLOGY  Raji Waterman M.D.  Mars Feliz D.O.  Kelley Parks M.D.  MD Nancy Kulkarni, MSN, ANP-C    Telephone: [phone]  Facsimile: [phone]    [address] [place] [address], [address]  [place] [address]

## 2025-02-13 NOTE — PROGRESS NOTE ADULT - SUBJECTIVE AND OBJECTIVE BOX
[facility] NEPHROLOGY- PROGRESS NOTE    64y Female with history of CHF presents as a transfer for LE CO2 angiogram. Nephrology consulted for elevated Scr.    REVIEW OF SYSTEMS:  Gen: no fevers  Cards: no chest pain  Resp: + dyspnea with exertion improving, + cough  GI: no nausea and vomiting, no diarrhea  Vascular: + LE edema improving    No Known Allergies      Hospital Medications: Medications reviewed        VITALS:  T(F): 98 (02-13-25 @ 13:38), Max: 98.5 (02-13-25 @ 06:20)  HR: 96 (02-13-25 @ 13:38)  BP: 110/65 (02-13-25 @ 13:38)  RR: 17 (02-13-25 @ 13:38)  SpO2: 98% (02-13-25 @ 13:38)  Wt(kg): --    02-12 @ 07:01  -  02-13 @ 07:00  --------------------------------------------------------  IN: 1075 mL / OUT: 2250 mL / NET: -1175 mL    02-13 @ 07:01  -  02-13 @ 14:25  --------------------------------------------------------  IN: 120 mL / OUT: 50 mL / NET: 70 mL      Height (cm): 162.6 (02-13 @ 13:28)  Weight (kg): 72.1 (02-13 @ 13:28)  BMI (kg/m2): 27.3 (02-13 @ 13:28)  BSA (m2): 1.77 (02-13 @ 13:28)        PHYSICAL EXAM:    Gen: NAD, calm  Cards: RRR, +S1/S2, no M/G/R  Resp: bibasilar rales  GI: soft, NT/ND, NABS  Vascular: 2+ RLE edema > LLE edema        LABS:  02-13    136  |  88[L]  |  54[H]  ----------------------------<  111[H]  3.4[L]   |  30  |  2.40[H]    Ca    9.6      13 Feb 2025 07:28  Phos  3.1     02-13  Mg     1.9     02-13    TPro  8.4[H]  /  Alb  4.4  /  TBili  0.8  /  DBili      /  AST  24  /  ALT  17  /  AlkPhos  68  02-11    Creatinine Trend: 2.40 <--, 2.50 <--, 2.39 <--, 2.49 <--, 2.42 <--, 2.29 <--, 2.07 <--, 2.16 <--, 2.27 <--, 2.40 <--                        9.4    12.45 )-----------( 375      ( 13 Feb 2025 07:28 )             27.4     Urine Studies:  Urinalysis Basic - ( 13 Feb 2025 07:28 )    Color:  / Appearance:  / SG:  / pH:   Gluc: 111 mg/dL / Ketone:   / Bili:  / Urobili:    Blood:  / Protein:  / Nitrite:    Leuk Esterase:  / RBC:  / WBC    Sq Epi:  / Non Sq Epi:  / Bacteria:

## 2025-02-14 DIAGNOSIS — E11.621 TYPE 2 DIABETES MELLITUS WITH FOOT ULCER: ICD-10-CM

## 2025-02-14 DIAGNOSIS — I25.10 ATHEROSCLEROTIC HEART DISEASE OF NATIVE CORONARY ARTERY WITHOUT ANGINA PECTORIS: ICD-10-CM

## 2025-02-14 DIAGNOSIS — I50.9 HEART FAILURE, UNSPECIFIED: ICD-10-CM

## 2025-02-14 LAB
ADD ON TEST-SPECIMEN IN LAB: SIGNIFICANT CHANGE UP
ANION GAP SERPL CALC-SCNC: 17 MMOL/L — SIGNIFICANT CHANGE UP (ref 5–17)
ANION GAP SERPL CALC-SCNC: 18 MMOL/L — HIGH (ref 5–17)
BUN SERPL-MCNC: 55 MG/DL — HIGH (ref 7–23)
BUN SERPL-MCNC: 55 MG/DL — HIGH (ref 7–23)
CALCIUM SERPL-MCNC: 9.7 MG/DL — SIGNIFICANT CHANGE UP (ref 8.4–10.5)
CALCIUM SERPL-MCNC: 9.7 MG/DL — SIGNIFICANT CHANGE UP (ref 8.4–10.5)
CHLORIDE SERPL-SCNC: 92 MMOL/L — LOW (ref 96–108)
CHLORIDE SERPL-SCNC: 92 MMOL/L — LOW (ref 96–108)
CO2 SERPL-SCNC: 29 MMOL/L — SIGNIFICANT CHANGE UP (ref 22–31)
CO2 SERPL-SCNC: 30 MMOL/L — SIGNIFICANT CHANGE UP (ref 22–31)
CREAT SERPL-MCNC: 2.39 MG/DL — HIGH (ref 0.5–1.3)
CREAT SERPL-MCNC: 2.4 MG/DL — HIGH (ref 0.5–1.3)
EGFR: 22 ML/MIN/1.73M2 — LOW
GAS PNL BLDV: SIGNIFICANT CHANGE UP
GLUCOSE BLDC GLUCOMTR-MCNC: 120 MG/DL — HIGH (ref 70–99)
GLUCOSE BLDC GLUCOMTR-MCNC: 125 MG/DL — HIGH (ref 70–99)
GLUCOSE BLDC GLUCOMTR-MCNC: 142 MG/DL — HIGH (ref 70–99)
GLUCOSE BLDC GLUCOMTR-MCNC: 186 MG/DL — HIGH (ref 70–99)
GLUCOSE SERPL-MCNC: 123 MG/DL — HIGH (ref 70–99)
GLUCOSE SERPL-MCNC: 137 MG/DL — HIGH (ref 70–99)
HCT VFR BLD CALC: 25.3 % — LOW (ref 34.5–45)
HGB BLD-MCNC: 8.6 G/DL — LOW (ref 11.5–15.5)
MAGNESIUM SERPL-MCNC: 2.1 MG/DL — SIGNIFICANT CHANGE UP (ref 1.6–2.6)
MCHC RBC-ENTMCNC: 24.4 PG — LOW (ref 27–34)
MCHC RBC-ENTMCNC: 34 G/DL — SIGNIFICANT CHANGE UP (ref 32–36)
MCV RBC AUTO: 71.7 FL — LOW (ref 80–100)
NRBC BLD AUTO-RTO: 0 /100 WBCS — SIGNIFICANT CHANGE UP (ref 0–0)
PHOSPHATE SERPL-MCNC: 4.1 MG/DL — SIGNIFICANT CHANGE UP (ref 2.5–4.5)
PLATELET # BLD AUTO: 362 K/UL — SIGNIFICANT CHANGE UP (ref 150–400)
POTASSIUM SERPL-MCNC: 3.4 MMOL/L — LOW (ref 3.5–5.3)
POTASSIUM SERPL-MCNC: 3.4 MMOL/L — LOW (ref 3.5–5.3)
POTASSIUM SERPL-SCNC: 3.4 MMOL/L — LOW (ref 3.5–5.3)
POTASSIUM SERPL-SCNC: 3.4 MMOL/L — LOW (ref 3.5–5.3)
RBC # BLD: 3.53 M/UL — LOW (ref 3.8–5.2)
RBC # FLD: 16.5 % — HIGH (ref 10.3–14.5)
SODIUM SERPL-SCNC: 139 MMOL/L — SIGNIFICANT CHANGE UP (ref 135–145)
SODIUM SERPL-SCNC: 139 MMOL/L — SIGNIFICANT CHANGE UP (ref 135–145)
WBC # BLD: 12.94 K/UL — HIGH (ref 3.8–10.5)
WBC # FLD AUTO: 12.94 K/UL — HIGH (ref 3.8–10.5)

## 2025-02-14 PROCEDURE — 99233 SBSQ HOSP IP/OBS HIGH 50: CPT

## 2025-02-14 PROCEDURE — 99232 SBSQ HOSP IP/OBS MODERATE 35: CPT

## 2025-02-14 PROCEDURE — 99221 1ST HOSP IP/OBS SF/LOW 40: CPT

## 2025-02-14 RX ORDER — MILRINONE LACTATE 1 MG/ML
0.25 INJECTION, SOLUTION INTRAVENOUS
Qty: 20 | Refills: 0 | Status: DISCONTINUED | OUTPATIENT
Start: 2025-02-14 | End: 2025-02-18

## 2025-02-14 RX ORDER — SPIRONOLACTONE 25 MG
50 TABLET ORAL DAILY
Refills: 0 | Status: DISCONTINUED | OUTPATIENT
Start: 2025-02-14 | End: 2025-02-18

## 2025-02-14 RX ORDER — SODIUM CHLORIDE 3 G/100ML
150 INJECTION, SOLUTION INTRAVENOUS
Refills: 0 | Status: DISCONTINUED | OUTPATIENT
Start: 2025-02-14 | End: 2025-02-17

## 2025-02-14 RX ORDER — ACETAMINOPHEN 500 MG/5ML
1000 LIQUID (ML) ORAL ONCE
Refills: 0 | Status: COMPLETED | OUTPATIENT
Start: 2025-02-14 | End: 2025-02-14

## 2025-02-14 RX ORDER — OXYCODONE HYDROCHLORIDE 30 MG/1
10 TABLET ORAL EVERY 4 HOURS
Refills: 0 | Status: DISCONTINUED | OUTPATIENT
Start: 2025-02-14 | End: 2025-02-17

## 2025-02-14 RX ADMIN — MILRINONE LACTATE 5.41 MICROGRAM(S)/KG/MIN: 1 INJECTION, SOLUTION INTRAVENOUS at 06:36

## 2025-02-14 RX ADMIN — Medication 1000 MILLIGRAM(S): at 13:12

## 2025-02-14 RX ADMIN — BUMETANIDE 15 MG/HR: 1 TABLET ORAL at 19:55

## 2025-02-14 RX ADMIN — HEPARIN SODIUM 5000 UNIT(S): 1000 INJECTION INTRAVENOUS; SUBCUTANEOUS at 05:35

## 2025-02-14 RX ADMIN — OXYCODONE HYDROCHLORIDE 5 MILLIGRAM(S): 30 TABLET ORAL at 06:35

## 2025-02-14 RX ADMIN — HEPARIN SODIUM 5000 UNIT(S): 1000 INJECTION INTRAVENOUS; SUBCUTANEOUS at 21:40

## 2025-02-14 RX ADMIN — Medication 81 MILLIGRAM(S): at 11:46

## 2025-02-14 RX ADMIN — Medication 40 MILLIGRAM(S): at 11:45

## 2025-02-14 RX ADMIN — AMPICILLIN SODIUM AND SULBACTAM SODIUM 200 GRAM(S): 1; .5 INJECTION, POWDER, FOR SOLUTION INTRAMUSCULAR; INTRAVENOUS at 09:57

## 2025-02-14 RX ADMIN — HEPARIN SODIUM 5000 UNIT(S): 1000 INJECTION INTRAVENOUS; SUBCUTANEOUS at 12:50

## 2025-02-14 RX ADMIN — ATORVASTATIN CALCIUM 40 MILLIGRAM(S): 80 TABLET, FILM COATED ORAL at 21:40

## 2025-02-14 RX ADMIN — SODIUM CHLORIDE 300 MILLILITER(S): 3 INJECTION, SOLUTION INTRAVENOUS at 17:55

## 2025-02-14 RX ADMIN — AMPICILLIN SODIUM AND SULBACTAM SODIUM 200 GRAM(S): 1; .5 INJECTION, POWDER, FOR SOLUTION INTRAMUSCULAR; INTRAVENOUS at 21:36

## 2025-02-14 RX ADMIN — OXYCODONE HYDROCHLORIDE 10 MILLIGRAM(S): 30 TABLET ORAL at 17:55

## 2025-02-14 RX ADMIN — Medication 650 MILLIGRAM(S): at 07:50

## 2025-02-14 RX ADMIN — Medication 50 MILLIGRAM(S): at 11:46

## 2025-02-14 RX ADMIN — Medication 40 MILLIEQUIVALENT(S): at 17:55

## 2025-02-14 RX ADMIN — Medication 25 MILLIGRAM(S): at 05:36

## 2025-02-14 RX ADMIN — LINAGLIPTIN 5 MILLIGRAM(S): 5 TABLET, FILM COATED ORAL at 11:46

## 2025-02-14 RX ADMIN — Medication 5 MILLIGRAM(S): at 17:55

## 2025-02-14 RX ADMIN — OXYCODONE HYDROCHLORIDE 10 MILLIGRAM(S): 30 TABLET ORAL at 18:55

## 2025-02-14 RX ADMIN — IRON SUCROSE 110 MILLIGRAM(S): 20 INJECTION, SOLUTION INTRAVENOUS at 11:47

## 2025-02-14 RX ADMIN — INSULIN GLARGINE-YFGN 10 UNIT(S): 100 INJECTION, SOLUTION SUBCUTANEOUS at 21:40

## 2025-02-14 RX ADMIN — INSULIN LISPRO 1: 100 INJECTION, SOLUTION INTRAVENOUS; SUBCUTANEOUS at 12:51

## 2025-02-14 RX ADMIN — SODIUM CHLORIDE 300 MILLILITER(S): 3 INJECTION, SOLUTION INTRAVENOUS at 11:45

## 2025-02-14 RX ADMIN — Medication 400 MILLIGRAM(S): at 12:42

## 2025-02-14 RX ADMIN — OXYCODONE HYDROCHLORIDE 10 MILLIGRAM(S): 30 TABLET ORAL at 14:40

## 2025-02-14 RX ADMIN — OXYCODONE HYDROCHLORIDE 5 MILLIGRAM(S): 30 TABLET ORAL at 05:35

## 2025-02-14 RX ADMIN — OXYCODONE HYDROCHLORIDE 5 MILLIGRAM(S): 30 TABLET ORAL at 09:59

## 2025-02-14 RX ADMIN — Medication 2 TABLET(S): at 21:41

## 2025-02-14 RX ADMIN — Medication 5 MILLIGRAM(S): at 05:36

## 2025-02-14 RX ADMIN — Medication 40 MILLIEQUIVALENT(S): at 09:58

## 2025-02-14 RX ADMIN — OXYCODONE HYDROCHLORIDE 10 MILLIGRAM(S): 30 TABLET ORAL at 13:49

## 2025-02-14 RX ADMIN — OXYCODONE HYDROCHLORIDE 5 MILLIGRAM(S): 30 TABLET ORAL at 00:44

## 2025-02-14 RX ADMIN — Medication 650 MILLIGRAM(S): at 08:50

## 2025-02-14 RX ADMIN — OXYCODONE HYDROCHLORIDE 5 MILLIGRAM(S): 30 TABLET ORAL at 10:59

## 2025-02-14 NOTE — PROGRESS NOTE ADULT - PROBLEM SELECTOR PLAN 1
- Check BG TID AC and HS while on PO diet  - continue Lantus 10 units QHS  - Continue tih Tradjenta  5 gm daily   - C/w low dose Admelog correctional scales TID AC and HS  Discharge Planning:   - Likely basal/bolus regimen given kidney function (doses TBD closer to d/c). Not a candidate for SGLT2 due to leg ulcers and also significantly decreased EGFR., can consider GLP1 in addition to Basal/bolus> will need close f/u with Optho due to h/o retinopathy.   Can send Rx for ozempic 0.25mg weekly x 4weeks and increase to 0.50mg, or mounjaro 2.5mg 2.5mg x4 weeks, then increase to 5.0mg weekly. (Patient denies medullary thyroid cancer, hx of pancreatitis or MEN2)  - Please make sure patient has DM management supplies (glucometer, lancets, strips, alcohol pads. pen needles)  -Patient should check BGs before meals and at bedtime.  -Contact PCP/endocrinology if BG is less than 70 X1, greater than  400 X1 or persistently greater than 200s   - Patient should check BG TID AC and HS at home. Can use CGM if pt wishes.  - Endocrine follow up: can f/u with Dr. Grace, Endocrinology.   - Needs Optho/ renal/cardiac /podiatry and vascular follow up

## 2025-02-14 NOTE — PROGRESS NOTE ADULT - SUBJECTIVE AND OBJECTIVE BOX
Patient seen and examined at bedside.    Overnight Events: L/RHC yesterday    REVIEW OF SYSTEMS:  CONSTITUTIONAL: No weakness, fevers or chills  EYES/ENT: No visual changes;  No dysphagia  NECK: No pain or stiffness  RESPIRATORY: No cough, wheezing, hemoptysis; No shortness of breath  CARDIOVASCULAR: No chest pain or palpitations; No lower extremity edema  GASTROINTESTINAL: No abdominal or epigastric pain. No nausea, vomiting, or hematemesis; No diarrhea or constipation. No melena or hematochezia.  BACK: No back pain  GENITOURINARY: No dysuria, frequency or hematuria  NEUROLOGICAL: No numbness or weakness  SKIN: No itching, burning, rashes, or lesions   All other review of systems is negative unless indicated above.            Current Meds:  acetaminophen     Tablet .. 650 milliGRAM(s) Oral every 6 hours PRN  ampicillin/sulbactam  IVPB 3 Gram(s) IV Intermittent every 12 hours  ampicillin/sulbactam  IVPB      aspirin enteric coated 81 milliGRAM(s) Oral daily  atorvastatin 40 milliGRAM(s) Oral at bedtime  benzocaine/menthol Lozenge 1 Lozenge Oral once  bisacodyl 5 milliGRAM(s) Oral every 12 hours  buMETAnide Infusion 3 mG/Hr IV Continuous <Continuous>  dextrose 5%. 1000 milliLiter(s) IV Continuous <Continuous>  dextrose 5%. 1000 milliLiter(s) IV Continuous <Continuous>  dextrose 50% Injectable 25 Gram(s) IV Push once  dextrose 50% Injectable 12.5 Gram(s) IV Push once  dextrose 50% Injectable 25 Gram(s) IV Push once  dextrose Oral Gel 15 Gram(s) Oral once PRN  glucagon  Injectable 1 milliGRAM(s) IntraMuscular once  heparin   Injectable 5000 Unit(s) SubCutaneous every 8 hours  insulin glargine Injectable (LANTUS) 10 Unit(s) SubCutaneous at bedtime  insulin lispro (ADMELOG) corrective regimen sliding scale   SubCutaneous three times a day before meals  insulin lispro (ADMELOG) corrective regimen sliding scale   SubCutaneous at bedtime  iron sucrose IVPB 200 milliGRAM(s) IV Intermittent every 24 hours  linagliptin 5 milliGRAM(s) Oral daily  melatonin 3 milliGRAM(s) Oral at bedtime PRN  milrinone Infusion 0.25 MICROgram(s)/kG/Min IV Continuous <Continuous>  ondansetron Injectable 4 milliGRAM(s) IV Push every 6 hours PRN  oxyCODONE    IR 5 milliGRAM(s) Oral every 4 hours PRN  pantoprazole  Injectable 40 milliGRAM(s) IV Push daily  polyethylene glycol 3350 17 Gram(s) Oral daily  potassium chloride    Tablet ER 40 milliEquivalent(s) Oral once  senna 2 Tablet(s) Oral at bedtime  spironolactone 25 milliGRAM(s) Oral daily      Vitals:  T(F): 98.3 (02-14), Max: 98.8 (02-13)  HR: 90 (02-14) (90 - 97)  BP: 104/65 (02-14) (92/50 - 114/58)  RR: 18 (02-14)  SpO2: 97% (02-14)  I&O's Summary    13 Feb 2025 07:01  -  14 Feb 2025 07:00  --------------------------------------------------------  IN: 933.2 mL / OUT: 1650 mL / NET: -716.8 mL        Physical Exam:  GEN: NAD  HEENT: EOMI, clear sclera  PULM: CTA b/l, no wheeze  CV: RRR S1 S2, no murmur, no JVD  ABD: S, NT, ND  EXT: b/l edema  PSYCH: normal affect  SKIN: No rash                          8.6    12.94 )-----------( 362      ( 14 Feb 2025 06:29 )             25.3     02-14    139  |  92[L]  |  55[H]  ----------------------------<  123[H]  3.4[L]   |  30  |  2.39[H]    Ca    9.7      14 Feb 2025 06:28  Phos  3.1     02-13  Mg     1.9     02-13

## 2025-02-14 NOTE — PROGRESS NOTE ADULT - SUBJECTIVE AND OBJECTIVE BOX
[facility] NEPHROLOGY- PROGRESS NOTE    64y Female with history of CHF presents as a transfer for LE CO2 angiogram. Nephrology consulted for elevated Scr.    REVIEW OF SYSTEMS:  Gen: no fevers  Cards: no chest pain  Resp: + dyspnea with exertion improving, + cough  GI: no nausea and vomiting, no diarrhea  Vascular: + LE edema improving    No Known Allergies      Hospital Medications: Medications reviewed        VITALS:  T(F): 97.9 (02-14-25 @ 13:43), Max: 98.8 (02-13-25 @ 16:12)  HR: 97 (02-14-25 @ 13:43)  BP: 103/67 (02-14-25 @ 13:43)  RR: 18 (02-14-25 @ 13:43)  SpO2: 95% (02-14-25 @ 13:43)  Wt(kg): --    02-13 @ 07:01  -  02-14 @ 07:00  --------------------------------------------------------  IN: 933.2 mL / OUT: 1650 mL / NET: -716.8 mL    02-14 @ 07:01  -  02-14 @ 13:45  --------------------------------------------------------  IN: 180 mL / OUT: 600 mL / NET: -420 mL        PHYSICAL EXAM:    Gen: NAD, calm  Cards: RRR, +S1/S2, no M/G/R  Resp: bibasilar rales  GI: soft, NT/ND, NABS  Vascular: 2+ RLE edema > LLE edema with legs wrapped        LABS:  02-14    139  |  92[L]  |  55[H]  ----------------------------<  123[H]  3.4[L]   |  30  |  2.39[H]    Ca    9.7      14 Feb 2025 06:28  Phos  4.1     02-14  Mg     2.1     02-14      Creatinine Trend: 2.39 <--, 2.37 <--, 2.40 <--, 2.50 <--, 2.39 <--, 2.49 <--, 2.42 <--, 2.29 <--, 2.07 <--, 2.16 <--, 2.27 <--                        8.6    12.94 )-----------( 362      ( 14 Feb 2025 06:29 )             25.3     Urine Studies:  Urinalysis Basic - ( 14 Feb 2025 06:28 )    Color:  / Appearance:  / SG:  / pH:   Gluc: 123 mg/dL / Ketone:   / Bili:  / Urobili:    Blood:  / Protein:  / Nitrite:    Leuk Esterase:  / RBC:  / WBC    Sq Epi:  / Non Sq Epi:  / Bacteria:

## 2025-02-14 NOTE — PROGRESS NOTE ADULT - ASSESSMENT
63y/o F w/h/o uncontrolled T2DM (A1C 11.6%) on Tresiba and CeQur insulin patch PTA. DM c/b PAD, retinopathy, CKD, CAD. Also h/oType 2 DM, HTN, HLD. Presented to OSH with worsening right leg pain and fluid secretion. Transferred to [facility] for CO2 angiogram. Found to have Low EF to 20% in [place]. Endocrine consulted for Type 2 DM management.    Tolerating POs, eats well. Last 24 hour BGs 120-186 with fasting . No hypoglycemia. On Milrinone and Bumex gtt        Home regimen: Tresiba 40 units, CeQur insulin patch about 2-10 units TID with meals

## 2025-02-14 NOTE — PROGRESS NOTE ADULT - SUBJECTIVE AND OBJECTIVE BOX
INTERNAL MEDICINE PROGRESS NOTE     NAME OF PATIENT: TOMASZ ALBA  MRN: [id number]  DATE OF VISIT: 02-14-25 @ 15:43    SUBJECTIVE/ ROS:  - Patient seen and examined by bedside   - Remains volume overloaded and cardiology adjusting diuretics   - Complaining of RLE pain with known PAD and oxycodone adjusted   - Pending discussion with vascular for angiogram     OBJECTIVE:  ICU Vital Signs Last 24 Hrs  T(C): 36.6 (14 Feb 2025 13:43), Max: 37.1 (13 Feb 2025 16:12)  T(F): 97.9 (14 Feb 2025 13:43), Max: 98.8 (13 Feb 2025 16:12)  HR: 97 (14 Feb 2025 13:43) (90 - 97)  BP: 103/67 (14 Feb 2025 13:43) (100/66 - 112/56)  BP(mean): --  ABP: --  ABP(mean): --  RR: 18 (14 Feb 2025 13:43) (18 - 20)  SpO2: 95% (14 Feb 2025 13:43) (95% - 99%)    O2 Parameters below as of 14 Feb 2025 13:43  Patient On (Oxygen Delivery Method): room air          02-14-25 @ 15:43  T(C): 36.6 (02-14-25 @ 13:43), Max: 37.1 (02-13-25 @ 16:12)  HR: 97 (02-14-25 @ 13:43) (90 - 97)  BP: 103/67 (02-14-25 @ 13:43) (100/66 - 112/56)  RR: 18 (02-14-25 @ 13:43) (18 - 20)  SpO2: 95% (02-14-25 @ 13:43) (95% - 99%)  Wt(kg): --  CAPILLARY BLOOD GLUCOSE      POCT Blood Glucose.: 186 mg/dL (14 Feb 2025 12:49)      HOSPITAL MEDICATIONS:  MEDICATIONS  (STANDING):  ampicillin/sulbactam  IVPB 3 Gram(s) IV Intermittent every 12 hours  ampicillin/sulbactam  IVPB      aspirin enteric coated 81 milliGRAM(s) Oral daily  atorvastatin 40 milliGRAM(s) Oral at bedtime  benzocaine/menthol Lozenge 1 Lozenge Oral once  bisacodyl 5 milliGRAM(s) Oral every 12 hours  buMETAnide Infusion 3 mG/Hr (15 mL/Hr) IV Continuous <Continuous>  dextrose 5%. 1000 milliLiter(s) (100 mL/Hr) IV Continuous <Continuous>  dextrose 5%. 1000 milliLiter(s) (50 mL/Hr) IV Continuous <Continuous>  dextrose 50% Injectable 25 Gram(s) IV Push once  dextrose 50% Injectable 12.5 Gram(s) IV Push once  dextrose 50% Injectable 25 Gram(s) IV Push once  glucagon  Injectable 1 milliGRAM(s) IntraMuscular once  heparin   Injectable 5000 Unit(s) SubCutaneous every 8 hours  insulin glargine Injectable (LANTUS) 10 Unit(s) SubCutaneous at bedtime  insulin lispro (ADMELOG) corrective regimen sliding scale   SubCutaneous three times a day before meals  insulin lispro (ADMELOG) corrective regimen sliding scale   SubCutaneous at bedtime  iron sucrose IVPB 200 milliGRAM(s) IV Intermittent every 24 hours  linagliptin 5 milliGRAM(s) Oral daily  metolazone 10 milliGRAM(s) Oral daily  milrinone Infusion 0.25 MICROgram(s)/kG/Min (5.41 mL/Hr) IV Continuous <Continuous>  pantoprazole  Injectable 40 milliGRAM(s) IV Push daily  polyethylene glycol 3350 17 Gram(s) Oral daily  potassium chloride    Tablet ER 40 milliEquivalent(s) Oral once  senna 2 Tablet(s) Oral at bedtime  sodium chloride 3% Bolus 150 milliLiter(s) IV Bolus <User Schedule>  spironolactone 50 milliGRAM(s) Oral daily    MEDICATIONS  (PRN):  acetaminophen     Tablet .. 650 milliGRAM(s) Oral every 6 hours PRN Mild Pain (1 - 3)  dextrose Oral Gel 15 Gram(s) Oral once PRN Blood Glucose LESS THAN 70 milliGRAM(s)/deciliter  melatonin 3 milliGRAM(s) Oral at bedtime PRN Insomnia  ondansetron Injectable 4 milliGRAM(s) IV Push every 6 hours PRN Nausea and/or Vomiting  oxyCODONE    IR 10 milliGRAM(s) Oral every 4 hours PRN Severe Pain (7 - 10)      PHYSICAL EXAMINATION:  General: NAD   Cardiology: S1/S2 with no murmur   Respiratory: CTA BL with no wheeze   GI: Soft and NTND  Extremities: BL LE edema. JOSE L of BL UE/LE, however RLE limited second to pain  Neurology: Awake with no acute neurological deficits     LABS:                        8.6    12.94 )-----------( 362      ( 14 Feb 2025 06:29 )             25.3     02-14    139  |  92[L]  |  55[H]  ----------------------------<  123[H]  3.4[L]   |  30  |  2.39[H]    Ca    9.7      14 Feb 2025 06:28  Phos  4.1     02-14  Mg     2.1     02-14            MICROBIOLOGY:     RADIOLOGY:    CARDIOLOGY:

## 2025-02-14 NOTE — CONSULT NOTE ADULT - SUBJECTIVE AND OBJECTIVE BOX
History of Present Illness:  64F h/o HLD, HTN, PAD, CVA, CAD, stents and does not follow a cardiologist, CHF, 1/16/25 EF 30-35%,  DM (A1C 11.6 % ) with insulin pump recent admission to [facility] 12/12/25 for left hallux diabetic foot ulcer with readmission 1/14/25 for CHF exacerbation and  LE edema.  Stress test 1/24/25) revealed a small-sized moderate defect in the apical wall that is predominantly fixed suggestive of an infarction with minimal marcus-infarct ischemia. Pt subsequently transferred to [facility] 1/29/25 on heparin gtt and dobutamine gtt - with concern for severe depletion of b/l lower extremity blood flow beyond the popliteal vessel at knee and CO2 angiogram with Dr. Baltazar. CTS consulted for cath performed today demonstrating multivessel disease with poor targets.      Past Medical History  DM (diabetes mellitus)    HTN (hypertension)    Cataract of both eyes, unspecified cataract type    Diabetic foot ulcer    Congestive heart failure (CHF)    CAD (coronary artery disease)        Past Surgical History  No significant past surgical history    Cataract of both eyes, unspecified cataract type    History of cholecystectomy        MEDICATIONS  (STANDING):  ampicillin/sulbactam  IVPB 3 Gram(s) IV Intermittent every 12 hours  ampicillin/sulbactam  IVPB      aspirin enteric coated 81 milliGRAM(s) Oral daily  atorvastatin 40 milliGRAM(s) Oral at bedtime  benzocaine/menthol Lozenge 1 Lozenge Oral once  bisacodyl 5 milliGRAM(s) Oral every 12 hours  buMETAnide Infusion 3 mG/Hr (15 mL/Hr) IV Continuous <Continuous>  dextrose 5%. 1000 milliLiter(s) (50 mL/Hr) IV Continuous <Continuous>  dextrose 5%. 1000 milliLiter(s) (100 mL/Hr) IV Continuous <Continuous>  dextrose 50% Injectable 25 Gram(s) IV Push once  dextrose 50% Injectable 12.5 Gram(s) IV Push once  dextrose 50% Injectable 25 Gram(s) IV Push once  glucagon  Injectable 1 milliGRAM(s) IntraMuscular once  heparin   Injectable 5000 Unit(s) SubCutaneous every 8 hours  insulin glargine Injectable (LANTUS) 10 Unit(s) SubCutaneous at bedtime  insulin lispro (ADMELOG) corrective regimen sliding scale   SubCutaneous three times a day before meals  insulin lispro (ADMELOG) corrective regimen sliding scale   SubCutaneous at bedtime  iron sucrose IVPB 200 milliGRAM(s) IV Intermittent every 24 hours  linagliptin 5 milliGRAM(s) Oral daily  metolazone 10 milliGRAM(s) Oral daily  milrinone Infusion 0.25 MICROgram(s)/kG/Min (5.41 mL/Hr) IV Continuous <Continuous>  pantoprazole  Injectable 40 milliGRAM(s) IV Push daily  polyethylene glycol 3350 17 Gram(s) Oral daily  senna 2 Tablet(s) Oral at bedtime  sodium chloride 3% Bolus 150 milliLiter(s) IV Bolus <User Schedule>  spironolactone 50 milliGRAM(s) Oral daily    MEDICATIONS  (PRN):  acetaminophen     Tablet .. 650 milliGRAM(s) Oral every 6 hours PRN Mild Pain (1 - 3)  dextrose Oral Gel 15 Gram(s) Oral once PRN Blood Glucose LESS THAN 70 milliGRAM(s)/deciliter  melatonin 3 milliGRAM(s) Oral at bedtime PRN Insomnia  ondansetron Injectable 4 milliGRAM(s) IV Push every 6 hours PRN Nausea and/or Vomiting  oxyCODONE    IR 5 milliGRAM(s) Oral every 4 hours PRN Severe Pain (7 - 10)    Antiplatelet therapy:                           Last dose/amt:    Allergies: Augmentin (Stomach Upset; Vomiting; Nausea)  No Known Allergies      SOCIAL HISTORY:  Smoker: [ ] Yes  [x ] No        PACK YEARS:                         WHEN QUIT?  ETOH use: [ ] Yes  [ x] No              FREQUENCY / QUANTITY:  Ilicit Drug use:  [ ] Yes  [x ] No  Occupation: Respiratory therapist at [employer]  Live with: brother  Assist device use: RW and wheelchair    Relevant Family History  FAMILY HISTORY:  Family history of CHF (congestive heart failure) (Mother)        Review of Systems  GENERAL:  Fevers[] chills[] sweats[] fatigue[] weight loss[] weight gain []                                        NEURO:  parathesias[] seizures []  syncope []  confusion []                                                                                  EYES: glasses[]  blurry vision[]  discharge[] pain[] glaucoma []                                                                            ENMT:  difficulty hearing []  vertigo[]  dysphagia[] epistaxis[] recent dental work []                                      CV:  chest pain[] palpitations[] GARG [] diaphoresis [] edema[x]                                                                                             RESPIRATORY:  wheezing[] SOB[] cough [] sputum[] hemoptysis[]                                                                    GI:  nausea[]  vomiting []  diarrhea[] constipation [] melena []                                                                        : hematuria[ ]  dysuria[ ] urgency[] incontinence[]                                                                                              MUSKULOSKELETAL:  arthritis[ ]  joint swelling [ ] muscle weakness [ ]   +b/l  lower extremity pain                                                               SKIN/BREAST:  rash[ ] itching [ ]  hair loss[ ] masses[ ]                                                                                                PSYCH:  dementia [ ] depression [ ] anxiety[ ]                                                                                                                  HEME/LYMPH:  bruises easily[ ] enlarged lymph nodes[ ] tender lymph nodes[ ]                                                 ENDOCRINE:  cold intolerance[ ] heat intolerance[ ] polydipsia[ ]                                                                              PHYSICAL EXAM  Vital Signs Last 24 Hrs  T(C): 36.8 (14 Feb 2025 10:29), Max: 37.1 (13 Feb 2025 16:12)  T(F): 98.3 (14 Feb 2025 10:29), Max: 98.8 (13 Feb 2025 16:12)  HR: 95 (14 Feb 2025 10:29) (90 - 97)  BP: 105/66 (14 Feb 2025 10:29) (92/50 - 114/58)  BP(mean): --  RR: 18 (14 Feb 2025 10:29) (16 - 20)  SpO2: 96% (14 Feb 2025 10:29) (95% - 99%)    Parameters below as of 14 Feb 2025 10:29  Patient On (Oxygen Delivery Method): room air        General: Well nourished, well developed, tearful                                                       Neuro: Normal exam oriented to person/place & time with no focal motor or sensory  deficits.                    Eyes: Normal exam of conjunctiva & lids, pupils equally reactive.   ENT: Normal exam of nasal/oral mucosa with absence of cyanosis.   Neck: Normal exam of jugular veins, trachea & thyroid.   Chest: Normal lung exam with good air movement absence of wheezes, rales, or rhonchi:                                                                          CV:  Auscultation: normal [ ] S3[ ] S4[ ] Irregular [ ] Rub[ ] Clicks[ ]  Murmurs none:[x ]systolic [ ]  diastolic [ ] holosystolic [ ]  Carotids: No Bruits[ ] Other____________ Abdominal Aorta: normal [ ] nonpalpable[ ]                                                                         GI: Normal exam of abdomen, liver & spleen with no noted masses or tenderness.                                                                                              Extremities: Normal no evidence of cyanosis or deformity Edema: none[ ]trace[ ]1+[ ]2+[x ]3+[ ]4+[ ]  Lower Extremity Pulses: Right[- ] Left[ -]Varicosities[ ]  SKIN : Normal exam to inspection & palpation.                                                           LABS:                        8.6    12.94 )-----------( 362      ( 14 Feb 2025 06:29 )             25.3     02-14    139  |  92[L]  |  55[H]  ----------------------------<  123[H]  3.4[L]   |  30  |  2.39[H]    Ca    9.7      14 Feb 2025 06:28  Phos  4.1     02-14  Mg     2.1     02-14        Urinalysis Basic - ( 14 Feb 2025 06:28 )    Color: x / Appearance: x / SG: x / pH: x  Gluc: 123 mg/dL / Ketone: x  / Bili: x / Urobili: x   Blood: x / Protein: x / Nitrite: x   Leuk Esterase: x / RBC: x / WBC x   Sq Epi: x / Non Sq Epi: x / Bacteria: x    cath official report pending    TTE / OSBALDO:< from: TTE W or WO Ultrasound Enhancing Agent (02.10.25 @ 12:09) >  CONCLUSIONS:      1. Left ventricular cavity is mildly dilated. Left ventricular systolic function is severely decreased with an ejection fraction of 20 % by Winchester's method of disks. Regional wall motion abnormalities present.   2. Multiple segmental abnormalities exist. See findings.   3. Reduced right ventricular systolic function.   4. No pericardial effusion seen.   5. Compared to the transthoracic echocardiogram performed on 1/16/2025, Worsening of the left ventricular function.    ________________________________________________________________________________________  FINDINGS:     Left Ventricle:  The left ventricular cavity is mildly dilated. Left ventricular systolic function is severely decreased with a calculated ejection fraction of 20 % by the Winchester's biplane method of disks. There are regional wall motion abnormalities present.  LV Wall Scoring: The entire septum, entire apex, entire inferior wall, mid  inferolateral segment, and mid anterolateral segment are hypokinetic. All  remaining scored segments are normal.          Right Ventricle:  The right ventricular cavity is normal in size and right ventricular systolic function is reduced. Tricuspid annular plane systolic excursion (TAPSE) is 1.2 cm (normal >=1.7 cm).     STS Score  Procedure Type: Isolated CABG  Perioperative Outcome	Estimate %  Operative Mortality	24.7%  Morbidity & Mortality	67.9%  Stroke	16.3%  Renal Failure	50.5%  Reoperation	12.5%  Prolonged Ventilation	50%  Deep Sternal Wound Infection	1.02%  Long Hospital Stay (>14 days)	64.4%  Short Hospital Stay (<6 days)*	2.89%  *higher values reflect a better outcome  Clinical Summary  Planned Surgery:	Isolated CABG, Urgent, First cardiovascular surgery  Demographics:	64 year old, , female, 72.1kg, 162.3cm, BMI: 27.4 kg/m²  Insurance/Payor:	[facility] [place]  Lab Values:	Creatinine: 2.39 mg/dL, Hematocrit: 25%, WBC Count: 12 10³/µL, Platelet Count: 42364 cells/µL  PreOp Medications:	Inotropes = 48 hrs, Insulin diabetes control  Substance Abuse:	Never smoker  Risk Factors / Comorbidities:	Insulin-dependent Diabetes Mellitus, Family Hx of CAD  Vascular RF:	Cerebrovascular Disease: CVA = 30 days, Peripheral Artery Disease  Cardiac Status:	Acute and chronic heart failure, NYHA Class II, Ejection Fraction = 20%  Coronary Artery Disease:	3 vessels diseased, Proximal LAD Stenosis = 70%, No coronary symptoms, MI: Unknown  Prev. Cardiac Interv:	Previous PCI: Not at this facility timing unknown             History of Present Illness:  64F h/o HLD, HTN, PAD, CVA, CAD, stents and does not follow a cardiologist, CHF, 1/16/25 EF 30-35%,  DM (A1C 11.6 % ) with insulin pump recent admission to [facility] 12/12/25 for left hallux diabetic foot ulcer with readmission 1/14/25 for CHF exacerbation and  LE edema.  Stress test 1/24/25) revealed a small-sized moderate defect in the apical wall that is predominantly fixed suggestive of an infarction with minimal marcus-infarct ischemia. Pt subsequently transferred to [facility] 1/29/25 on heparin gtt and dobutamine gtt - with concern for severe depletion of b/l lower extremity blood flow beyond the popliteal vessel at knee and CO2 angiogram with Dr. Baltazar. CTS consulted for cath performed today demonstrating multivessel disease with poor targets.      Past Medical History  DM (diabetes mellitus)    HTN (hypertension)    Cataract of both eyes, unspecified cataract type    Diabetic foot ulcer    Congestive heart failure (CHF)    CAD (coronary artery disease)        Past Surgical History  No significant past surgical history    Cataract of both eyes, unspecified cataract type    History of cholecystectomy        MEDICATIONS  (STANDING):  ampicillin/sulbactam  IVPB 3 Gram(s) IV Intermittent every 12 hours  ampicillin/sulbactam  IVPB      aspirin enteric coated 81 milliGRAM(s) Oral daily  atorvastatin 40 milliGRAM(s) Oral at bedtime  benzocaine/menthol Lozenge 1 Lozenge Oral once  bisacodyl 5 milliGRAM(s) Oral every 12 hours  buMETAnide Infusion 3 mG/Hr (15 mL/Hr) IV Continuous <Continuous>  dextrose 5%. 1000 milliLiter(s) (50 mL/Hr) IV Continuous <Continuous>  dextrose 5%. 1000 milliLiter(s) (100 mL/Hr) IV Continuous <Continuous>  dextrose 50% Injectable 25 Gram(s) IV Push once  dextrose 50% Injectable 12.5 Gram(s) IV Push once  dextrose 50% Injectable 25 Gram(s) IV Push once  glucagon  Injectable 1 milliGRAM(s) IntraMuscular once  heparin   Injectable 5000 Unit(s) SubCutaneous every 8 hours  insulin glargine Injectable (LANTUS) 10 Unit(s) SubCutaneous at bedtime  insulin lispro (ADMELOG) corrective regimen sliding scale   SubCutaneous three times a day before meals  insulin lispro (ADMELOG) corrective regimen sliding scale   SubCutaneous at bedtime  iron sucrose IVPB 200 milliGRAM(s) IV Intermittent every 24 hours  linagliptin 5 milliGRAM(s) Oral daily  metolazone 10 milliGRAM(s) Oral daily  milrinone Infusion 0.25 MICROgram(s)/kG/Min (5.41 mL/Hr) IV Continuous <Continuous>  pantoprazole  Injectable 40 milliGRAM(s) IV Push daily  polyethylene glycol 3350 17 Gram(s) Oral daily  senna 2 Tablet(s) Oral at bedtime  sodium chloride 3% Bolus 150 milliLiter(s) IV Bolus <User Schedule>  spironolactone 50 milliGRAM(s) Oral daily    MEDICATIONS  (PRN):  acetaminophen     Tablet .. 650 milliGRAM(s) Oral every 6 hours PRN Mild Pain (1 - 3)  dextrose Oral Gel 15 Gram(s) Oral once PRN Blood Glucose LESS THAN 70 milliGRAM(s)/deciliter  melatonin 3 milliGRAM(s) Oral at bedtime PRN Insomnia  ondansetron Injectable 4 milliGRAM(s) IV Push every 6 hours PRN Nausea and/or Vomiting  oxyCODONE    IR 5 milliGRAM(s) Oral every 4 hours PRN Severe Pain (7 - 10)    Antiplatelet therapy:                           Last dose/amt:    Allergies: Augmentin (Stomach Upset; Vomiting; Nausea)  No Known Allergies      SOCIAL HISTORY:  Smoker: [ ] Yes  [x ] No        PACK YEARS:                         WHEN QUIT?  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  Assist device use: RW and wheelchair    Relevant Family History  FAMILY HISTORY:  Family history of CHF (congestive heart failure) (Mother)        Review of Systems  GENERAL:  Fevers[] chills[] sweats[] fatigue[] weight loss[] weight gain []                                        NEURO:  parathesias[] seizures []  syncope []  confusion []                                                                                  EYES: glasses[]  blurry vision[]  discharge[] pain[] glaucoma []                                                                            ENMT:  difficulty hearing []  vertigo[]  dysphagia[] epistaxis[] recent dental work []                                      CV:  chest pain[] palpitations[] GARG [] diaphoresis [] edema[x]                                                                                             RESPIRATORY:  wheezing[] SOB[] cough [] sputum[] hemoptysis[]                                                                    GI:  nausea[]  vomiting []  diarrhea[] constipation [] melena []                                                                        : hematuria[ ]  dysuria[ ] urgency[] incontinence[]                                                                                              MUSKULOSKELETAL:  arthritis[ ]  joint swelling [ ] muscle weakness [ ]   +b/l  lower extremity pain                                                               SKIN/BREAST:  rash[ ] itching [ ]  hair loss[ ] masses[ ]                                                                                                PSYCH:  dementia [ ] depression [ ] anxiety[ ]                                                                                                                  HEME/LYMPH:  bruises easily[ ] enlarged lymph nodes[ ] tender lymph nodes[ ]                                                 ENDOCRINE:  cold intolerance[ ] heat intolerance[ ] polydipsia[ ]                                                                              PHYSICAL EXAM  Vital Signs Last 24 Hrs  T(C): 36.8 (14 Feb 2025 10:29), Max: 37.1 (13 Feb 2025 16:12)  T(F): 98.3 (14 Feb 2025 10:29), Max: 98.8 (13 Feb 2025 16:12)  HR: 95 (14 Feb 2025 10:29) (90 - 97)  BP: 105/66 (14 Feb 2025 10:29) (92/50 - 114/58)  BP(mean): --  RR: 18 (14 Feb 2025 10:29) (16 - 20)  SpO2: 96% (14 Feb 2025 10:29) (95% - 99%)    Parameters below as of 14 Feb 2025 10:29  Patient On (Oxygen Delivery Method): room air        General: Well nourished, well developed, tearful                                                       Neuro: Normal exam oriented to person/place & time with no focal motor or sensory  deficits.                    Eyes: Normal exam of conjunctiva & lids, pupils equally reactive.   ENT: Normal exam of nasal/oral mucosa with absence of cyanosis.   Neck: Normal exam of jugular veins, trachea & thyroid.   Chest: Normal lung exam with good air movement absence of wheezes, rales, or rhonchi:                                                                          CV:  Auscultation: normal [ ] S3[ ] S4[ ] Irregular [ ] Rub[ ] Clicks[ ]  Murmurs none:[x ]systolic [ ]  diastolic [ ] holosystolic [ ]  Carotids: No Bruits[ ] Other____________ Abdominal Aorta: normal [ ] nonpalpable[ ]                                                                         GI: Normal exam of abdomen, liver & spleen with no noted masses or tenderness.                                                                                              Extremities: Normal no evidence of cyanosis or deformity Edema: none[ ]trace[ ]1+[ ]2+[x ]3+[ ]4+[ ]  Lower Extremity Pulses: Right[- ] Left[ -]Varicosities[ ]  SKIN : Normal exam to inspection & palpation.                                                           LABS:                        8.6    12.94 )-----------( 362      ( 14 Feb 2025 06:29 )             25.3     02-14    139  |  92[L]  |  55[H]  ----------------------------<  123[H]  3.4[L]   |  30  |  2.39[H]    Ca    9.7      14 Feb 2025 06:28  Phos  4.1     02-14  Mg     2.1     02-14        Urinalysis Basic - ( 14 Feb 2025 06:28 )    Color: x / Appearance: x / SG: x / pH: x  Gluc: 123 mg/dL / Ketone: x  / Bili: x / Urobili: x   Blood: x / Protein: x / Nitrite: x   Leuk Esterase: x / RBC: x / WBC x   Sq Epi: x / Non Sq Epi: x / Bacteria: x    cath official report pending    TTE / OSBALDO:< from: TTE W or WO Ultrasound Enhancing Agent (02.10.25 @ 12:09) >  CONCLUSIONS:      1. Left ventricular cavity is mildly dilated. Left ventricular systolic function is severely decreased with an ejection fraction of 20 % by Winchester's method of disks. Regional wall motion abnormalities present.   2. Multiple segmental abnormalities exist. See findings.   3. Reduced right ventricular systolic function.   4. No pericardial effusion seen.   5. Compared to the transthoracic echocardiogram performed on 1/16/2025, Worsening of the left ventricular function.    ________________________________________________________________________________________  FINDINGS:     Left Ventricle:  The left ventricular cavity is mildly dilated. Left ventricular systolic function is severely decreased with a calculated ejection fraction of 20 % by the Winchester's biplane method of disks. There are regional wall motion abnormalities present.  LV Wall Scoring: The entire septum, entire apex, entire inferior wall, mid  inferolateral segment, and mid anterolateral segment are hypokinetic. All  remaining scored segments are normal          Right Ventricle:  The right ventricular cavity is normal in size and right ventricular systolic function is reduced. Tricuspid annular plane systolic excursion (TAPSE) is 1.2 cm (normal >=1.7 cm).     STS Score  Procedure Type: Isolated CABG  Perioperative Outcome	Estimate %  Operative Mortality	24.7%  Morbidity & Mortality	67.9%  Stroke	16.3%  Renal Failure	50.5%  Reoperation	12.5%  Prolonged Ventilation	50%  Deep Sternal Wound Infection	1.02%  Long Hospital Stay (>14 days)	64.4%  Short Hospital Stay (<6 days)*	2.89%  *higher values reflect a better outcome  Clinical Summary  Planned Surgery:	Isolated CABG, Urgent, First cardiovascular surgery  Demographics:	64 year old, , female, 72.1kg, 162.3cm, BMI: 27.4 kg/m²  [place]	[facility] [place]  Lab Values:	Creatinine: 2.39 mg/dL, Hematocrit: 25%, WBC Count: 12 10³/µL, Platelet Count: 33174 cells/µL  PreOp Medications:	Inotropes = 48 hrs, Insulin diabetes control  Substance Abuse:	Never smoker  Risk Factors / Comorbidities:	Insulin-dependent Diabetes Mellitus, Family Hx of CAD  Vascular RF:	Cerebrovascular Disease: CVA = 30 days, Peripheral Artery Disease  Cardiac Status:	Acute and chronic heart failure, NYHA Class II, Ejection Fraction = 20%  Coronary Artery Disease:	3 vessels diseased, Proximal LAD Stenosis = 70%, No coronary symptoms, MI: Unknown  Prev. Cardiac Interv:	Previous PCI: Not at this facility timing unknown

## 2025-02-14 NOTE — PROGRESS NOTE ADULT - SUBJECTIVE AND OBJECTIVE BOX
Seen earlier today     Chief Complaint: Diabetes Mellitus follow up    INTERVAL HX: " Feel good" Tolerating POs, eats well. Last 24 hour BGs 120-186 with fasting . No hypoglycemia. Remains on Milrinone and Bumex gtt       Review of Systems:  General: As above  GI: No nausea, vomiting  Endocrine: no  S&Sx of hypoglycemia    Allergies    No Known Allergies    Intolerances    Augmentin (Stomach Upset; Vomiting; Nausea)    MEDICATIONS  (STANDING):  ampicillin/sulbactam  IVPB 3 Gram(s) IV Intermittent every 12 hours  ampicillin/sulbactam  IVPB      aspirin enteric coated 81 milliGRAM(s) Oral daily  atorvastatin 40 milliGRAM(s) Oral at bedtime  benzocaine/menthol Lozenge 1 Lozenge Oral once  bisacodyl 5 milliGRAM(s) Oral every 12 hours  buMETAnide Infusion 3 mG/Hr (15 mL/Hr) IV Continuous <Continuous>  dextrose 5%. 1000 milliLiter(s) (100 mL/Hr) IV Continuous <Continuous>  dextrose 5%. 1000 milliLiter(s) (50 mL/Hr) IV Continuous <Continuous>  dextrose 50% Injectable 25 Gram(s) IV Push once  dextrose 50% Injectable 12.5 Gram(s) IV Push once  dextrose 50% Injectable 25 Gram(s) IV Push once  glucagon  Injectable 1 milliGRAM(s) IntraMuscular once  heparin   Injectable 5000 Unit(s) SubCutaneous every 8 hours  insulin glargine Injectable (LANTUS) 10 Unit(s) SubCutaneous at bedtime  insulin lispro (ADMELOG) corrective regimen sliding scale   SubCutaneous three times a day before meals  insulin lispro (ADMELOG) corrective regimen sliding scale   SubCutaneous at bedtime  iron sucrose IVPB 200 milliGRAM(s) IV Intermittent every 24 hours  linagliptin 5 milliGRAM(s) Oral daily  metolazone 10 milliGRAM(s) Oral daily  milrinone Infusion 0.25 MICROgram(s)/kG/Min (5.41 mL/Hr) IV Continuous <Continuous>  pantoprazole  Injectable 40 milliGRAM(s) IV Push daily  polyethylene glycol 3350 17 Gram(s) Oral daily  potassium chloride    Tablet ER 40 milliEquivalent(s) Oral once  senna 2 Tablet(s) Oral at bedtime  sodium chloride 3% Bolus 150 milliLiter(s) IV Bolus <User Schedule>  spironolactone 50 milliGRAM(s) Oral daily      atorvastatin   40 milliGRAM(s) Oral (02-13-25 @ 21:03)    insulin glargine Injectable (LANTUS)   10 Unit(s) SubCutaneous (02-13-25 @ 21:05)    insulin lispro (ADMELOG) corrective regimen sliding scale   1 Unit(s) SubCutaneous (02-14-25 @ 12:51)   1 Unit(s) SubCutaneous (02-13-25 @ 18:15)    linagliptin   5 milliGRAM(s) Oral (02-14-25 @ 11:46)        PHYSICAL EXAM:  VITALS: T(C): 36.6 (02-14-25 @ 13:43)  T(F): 97.9 (02-14-25 @ 13:43), Max: 98.5 (02-14-25 @ 01:12)  HR: 97 (02-14-25 @ 13:43) (90 - 97)  BP: 103/67 (02-14-25 @ 13:43) (100/66 - 105/66)  RR:  (18 - 18)  SpO2:  (95% - 98%)  Wt(kg): --  GENERAL: Female sitting in bed.  in NAD  Respiratory: Respirations unlabored   Extremities: Warm, no edema  NEURO: Alert , appropriate     LABS:  POCT Blood Glucose.: 186 mg/dL (02-14-25 @ 12:49)  POCT Blood Glucose.: 120 mg/dL (02-14-25 @ 08:25)  POCT Blood Glucose.: 156 mg/dL (02-13-25 @ 21:04)  POCT Blood Glucose.: 176 mg/dL (02-13-25 @ 17:48)  POCT Blood Glucose.: 226 mg/dL (02-13-25 @ 11:45)  POCT Blood Glucose.: 120 mg/dL (02-13-25 @ 08:35)  POCT Blood Glucose.: 146 mg/dL (02-12-25 @ 21:20)  POCT Blood Glucose.: 160 mg/dL (02-12-25 @ 17:30)  POCT Blood Glucose.: 209 mg/dL (02-12-25 @ 13:29)  POCT Blood Glucose.: 157 mg/dL (02-12-25 @ 12:23)  POCT Blood Glucose.: 102 mg/dL (02-12-25 @ 08:27)  POCT Blood Glucose.: 106 mg/dL (02-11-25 @ 21:33)  POCT Blood Glucose.: 97 mg/dL (02-11-25 @ 17:29)                          8.6    12.94 )-----------( 362      ( 14 Feb 2025 06:29 )             25.3     02-14    139  |  92[L]  |  55[H]  ----------------------------<  123[H]  3.4[L]   |  30  |  2.39[H]    Ca    9.7      14 Feb 2025 06:28  Phos  4.1     02-14  Mg     2.1     02-14      eGFR: 22 mL/min/1.73m2 (14 Feb 2025 06:28)  eGFR: 22 mL/min/1.73m2 (13 Feb 2025 19:48)    01-24 Chol 122 Direct LDL -- LDL calculated 66 HDL 39[L] Trig 89  Thyroid Function Tests:  01-24 @ 05:40 TSH 5.34 FreeT4 -- T3 -- Anti TPO -- Anti Thyroglobulin Ab -- TSI --      A1C with Estimated Average Glucose Result: 11.6 % (01-24-25 @ 05:40)  A1C with Estimated Average Glucose Result: >15.5 % (12-13-24 @ 06:49)    Estimated Average Glucose: 286 mg/dL (01-24-25 @ 05:40)  Estimated Average Glucose: >398 mg/dL (12-13-24 @ 06:49)        Diet, Regular:   Consistent Carbohydrate No Snacks (CSTCHO)  Low Sodium  No Concentrated Phosphorus  Lacto Veg (Accepts Milk & Milk Products) (02-14-25 @ 13:42) [Active]

## 2025-02-14 NOTE — CONSULT NOTE ADULT - ASSESSMENT
64F h/o HLD, HTN, PAD, CVA, CAD, stents, CHF, EF 20%,  DM (A1C 11.6 % ) with insulin pump, left hallux diabetic foot ulcer admitted for CHF exacerbation and LE edema with concern chronic limb threatening ischemia pending  CO2 angiogram with Dr. Baltazar. LHC demonstrates multivessel disease with nuclear scan suggesting fixed defect.

## 2025-02-14 NOTE — PROGRESS NOTE ADULT - SUBJECTIVE AND OBJECTIVE BOX
MR#[id number]  PATIENT NAME:COLE ALBA    DATE OF SERVICE: 02-14-25 @ 06:25  Patient was seen and examined by Yordy Gilmore MD on    02-14-25 @ 06:25 .  Interim events noted.Consultant notes ,Labs,Telemetry reviewed by me       HOSPITAL COURSE: HPI:  64F, hx of CHF (last TTE on 1/16/25 EF 30-35%, G2DD), DM, diabetic foot ulcer [facility] presented as a transfer for worsening R. leg pain and fluid secretion, On non-invasive imaging demonstrating severe depletion of RLE blood flow beyond the popliteal vessel at knee and similar findings in L. Was transferred to [facility] for CO2 angiogram with Dr. Baltazar. (29 Jan 2025 20:05)    [facility] admission: LVSF moderately decreased w/EF 30-35%. Moderate G2DD. Mild MR. Ischemic evaluation was recommended for which patient undergone stress test which revealed a small-sized moderate defect in the apical wall that is predominantly fixed suggestive of an infarction with minimal marcus-infarct ischemia. 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 Left leg Arterial Duplex: Slightly tardus parvus waveform of the left popliteal artery is noted, for which underlying disease cannot be excluded. Posterior tibial artery waveform is nonpulsatile. Anterior tibial artery tardus parvus flow is noted. Patient started on Heparin and Dobutamine drip with transfer to [facility] for further management.        INTERIM EVENTS:Patient seen at bedside ,interim events noted.  Awake alert remains on Bumex gtt and  gtt at 2.5mcg CO2 Angiogram postponed to next week-she is still orthopneac  02/08-on  5mcg and Bumex gtt   02/10-OOB still orthopneac [id number] LE weakness and swelling  02/11-c/o LE pain not dyspneac  held on Bumex 3 mg/Hr  02/12-Awake seen by HF   02/13-Remains on Bumex 3mg/Hr for possible LHC/RHC  02/14-Had cath Multivessel CAD started on Milrinone for CTS evaluation albeit targets not optimal    PMH -reviewed admission note, no change since admission  HEART FAILURE: Acute[x ]Chronic[x ] Systolic[x ] Diastolic[ ] Combined Systolic and Diastolic[ ]  CAD[ ] CABG[ ] PCI[ ]  DEVICES[ ] PPM[ ] ICD[ ] ILR[ ]  ATRIAL FIBRILLATION[ ] Paroxysmal[ ] Permanent[ ] CHADS2-[  ]  JAMEL[ ] CKD1[ ] CKD2[ ] CKD3[ ] CKD4[ ] ESRD[ ]  COPD[ ] HTN[ ]   DM[ ] Type1[ ] Type 2[ ]   CVA[ ] Paresis[ ]    AMBULATION: Assisted[ ] Cane/walker[ ] Independent[ x]  MEDICATIONS  (STANDING):  ampicillin/sulbactam  IVPB 3 Gram(s) IV Intermittent every 12 hours  ampicillin/sulbactam  IVPB      aspirin enteric coated 81 milliGRAM(s) Oral daily  atorvastatin 40 milliGRAM(s) Oral at bedtime  benzocaine/menthol Lozenge 1 Lozenge Oral once  bisacodyl 5 milliGRAM(s) Oral every 12 hours  buMETAnide Infusion 3 mG/Hr (15 mL/Hr) IV Continuous <Continuous>  glucagon  Injectable 1 milliGRAM(s) IntraMuscular once  heparin   Injectable 5000 Unit(s) SubCutaneous every 8 hours  insulin glargine Injectable (LANTUS) 10 Unit(s) SubCutaneous at bedtime  insulin lispro (ADMELOG) corrective regimen sliding scale   SubCutaneous three times a day before meals  insulin lispro (ADMELOG) corrective regimen sliding scale   SubCutaneous at bedtime  iron sucrose IVPB 200 milliGRAM(s) IV Intermittent every 24 hours  linagliptin 5 milliGRAM(s) Oral daily  milrinone Infusion 0.125 MICROgram(s)/kG/Min (2.7 mL/Hr) IV Continuous <Continuous>  pantoprazole  Injectable 40 milliGRAM(s) IV Push daily  polyethylene glycol 3350 17 Gram(s) Oral daily  senna 2 Tablet(s) Oral at bedtime  spironolactone 25 milliGRAM(s) Oral daily    MEDICATIONS  (PRN):  acetaminophen     Tablet .. 650 milliGRAM(s) Oral every 6 hours PRN Mild Pain (1 - 3)  dextrose Oral Gel 15 Gram(s) Oral once PRN Blood Glucose LESS THAN 70 milliGRAM(s)/deciliter  melatonin 3 milliGRAM(s) Oral at bedtime PRN Insomnia  ondansetron Injectable 4 milliGRAM(s) IV Push every 6 hours PRN Nausea and/or Vomiting  oxyCODONE    IR 5 milliGRAM(s) Oral every 4 hours PRN Severe Pain (7 - 10)            REVIEW OF SYSTEMS:  Constitutional: [ ] fever, [ ]weight loss,  [x ]fatigue [ ]weight gain  Eyes: [ ] visual changes  Respiratory: [ ]shortness of breath;  [ ] cough, [ ]wheezing, [ ]chills, [ ]hemoptysis  Cardiovascular: [ ] chest pain, [ ]palpitations, [ ]dizziness,  [x ]leg swelling[ ]orthopnea[ ]PND  Gastrointestinal: [ ] abdominal pain, [ ]nausea, [ ]vomiting,  [ ]diarrhea [ ]Constipation [ ]Melena  Genitourinary: [ ] dysuria, [ ] hematuria [ ][place]  Neurologic: [ ] headaches [ ] tremors[ ]weakness [ ]Paralysis Right[ ] Left[ ]  Skin: [ ] itching, [ ]burning, [ ] rashes  Endocrine: [ ] heat or cold intolerance  Musculoskeletal: [ ] joint pain or swelling; [ ] muscle, back, or extremity pain  Psychiatric: [ ] depression, [ ]anxiety, [ ]mood swings, or [ ]difficulty sleeping  Hematologic: [ ] easy bruising, [ ] bleeding gums    [ ] All remaining systems negative except as per above.   [ ]Unable to obtain.  [x] No change in ROS since admission      Vital Signs Last 24 Hrs  T(C): 36.8 (14 Feb 2025 05:02), Max: 37.1 (13 Feb 2025 16:12)  T(F): 98.3 (14 Feb 2025 05:02), Max: 98.8 (13 Feb 2025 16:12)  HR: 90 (14 Feb 2025 05:02) (90 - 97)  BP: 104/65 (14 Feb 2025 05:02) (92/50 - 114/58)  RR: 18 (14 Feb 2025 05:02) (16 - 20)  SpO2: 97% (14 Feb 2025 05:02) (95% - 99%)    Parameters below as of 14 Feb 2025 05:02  Patient On (Oxygen Delivery Method): room air      I&O's Summary    12 Feb 2025 07:01  -  13 Feb 2025 07:00  --------------------------------------------------------  IN: 1075 mL / OUT: 2250 mL / NET: -1175 mL    13 Feb 2025 07:01  -  14 Feb 2025 06:25  --------------------------------------------------------  IN: 862.4 mL / OUT: 1650 mL / NET: -787.6 mL        PHYSICAL EXAM:  General: No acute distress BMI-28  HEENT: EOMI, PERRL  Neck: Supple, [ ] JVD  Lungs: Equal air entry bilaterally; [ ] rales [ ] wheezing [ ] rhonchi  Heart: Regular rate and rhythm; [x ] murmur   2/6 [ x] systolic [ ] diastolic [ ] radiation[ ] rubs [ ]  gallops  Abdomen: Nontender, bowel sounds present  Extremities: No clubbing, cyanosis, [xx ] edema   Nervous system:  Alert & Oriented X3, no focal deficits  Psychiatric: Normal affect  Skin: No rashes or lesions    LABS:  02-13    136  |  91[L]  |  54[H]  ----------------------------<  160[H]  3.7   |  31  |  2.37[H]    Ca    9.4      13 Feb 2025 19:48  Phos  3.1     02-13  Mg     1.9     02-13      Creatinine Trend: 2.37<--, 2.40<--, 2.50<--, 2.39<--, 2.49<--, 2.42<--                        9.4    12.45 )-----------( 375      ( 13 Feb 2025 07:28 )             27.4       Lactate, Blood: 1.5 <--1.5 <--1.6  mmol/L       VA Duplex Lower Ext Vein Scan, Bilat (02.10.25 @ 17:42) >  IMPRESSION: No evidence of bilateral DVT within the imaged veins.       TTE W or WO Ultrasound Enhancing Agent (02.10.25 @ 12:09) >  CONCLUSIONS:      1. Left ventricular cavity is mildly dilated. Left ventricular systolic function is severely decreased with an ejection fraction of 20 % by Winchester's method of disks. Regional wall motion abnormalities present.   2. Multiple segmental abnormalities exist. See findings.   The entire septum, entire apex, entire inferior wall, mid inferolateral segment, and mid anterolateral segment are hypokinetic. All remaining scored segments are normal.     3. Reduced right ventricular systolic function.   4. No pericardial effusion seen.   5. Compared to the transthoracic echocardiogram performed on 1/16/2025, Worsening of the left ventricular function.        VA Duplex Lower Ext Vein Scan, Bilat (02.10.25 @ 17:42) >  IMPRESSION: No evidence of bilateral DVT within the imaged veins.        Nuclear Stress Test-Pharmacologic.. (01.24.25 @ 10:31) >  Conclusions:   1. Myocardial Perfusion: Abnormal.   2. Qualitative Perfusion:      - small-sized, moderate defect(s) in the apical wall that is predominantly fixed suggestive of an infarction with minimal marcus-infarct ischemia.   3. The post stress left ventricular EF is 25 %. The stress end diastolic volume is 118 ml.   4. Results D/Wcardiologist Dr. Gilmore at 18:31

## 2025-02-14 NOTE — PROGRESS NOTE ADULT - SUBJECTIVE AND OBJECTIVE BOX
Patient is a 64y old  Female who presents with a chief complaint of Transfer for CO2 angiogram; PAD and CLTI (14 Feb 2025 08:30)       INTERVAL HPI/OVERNIGHT EVENTS:  Patient seen and evaluated at bedside.  Pt is resting comfortable in NAD. Denies N/V/F/C.      Allergies    No Known Allergies    Intolerances    Augmentin (Stomach Upset; Vomiting; Nausea)      Vital Signs Last 24 Hrs  T(C): 36.8 (14 Feb 2025 05:02), Max: 37.1 (13 Feb 2025 16:12)  T(F): 98.3 (14 Feb 2025 05:02), Max: 98.8 (13 Feb 2025 16:12)  HR: 90 (14 Feb 2025 05:02) (90 - 97)  BP: 104/65 (14 Feb 2025 05:02) (92/50 - 114/58)  BP(mean): --  RR: 18 (14 Feb 2025 05:02) (16 - 20)  SpO2: 97% (14 Feb 2025 05:02) (95% - 99%)    Parameters below as of 14 Feb 2025 05:02  Patient On (Oxygen Delivery Method): room air        LABS:                        8.6    12.94 )-----------( 362      ( 14 Feb 2025 06:29 )             25.3     02-14    139  |  92[L]  |  55[H]  ----------------------------<  123[H]  3.4[L]   |  30  |  2.39[H]    Ca    9.7      14 Feb 2025 06:28  Phos  3.1     02-13  Mg     1.9     02-13        Urinalysis Basic - ( 14 Feb 2025 06:28 )    Color: x / Appearance: x / SG: x / pH: x  Gluc: 123 mg/dL / Ketone: x  / Bili: x / Urobili: x   Blood: x / Protein: x / Nitrite: x   Leuk Esterase: x / RBC: x / WBC x   Sq Epi: x / Non Sq Epi: x / Bacteria: x      CAPILLARY BLOOD GLUCOSE      POCT Blood Glucose.: 120 mg/dL (14 Feb 2025 08:25)  POCT Blood Glucose.: 156 mg/dL (13 Feb 2025 21:04)  POCT Blood Glucose.: 176 mg/dL (13 Feb 2025 17:48)  POCT Blood Glucose.: 226 mg/dL (13 Feb 2025 11:45)      Lower Extremity Physical Exam:  Vascular: DP/PT 0/4, B/L, CFT <3 seconds B/L, Temperature gradient warm to cool, B/L.   Neuro: Epicritic sensation diminished to the level of digits, B/L.  Musculoskeletal/Ortho: unremarkable   Skin: Bilateral lower extremity venous stasis wounds to dermis, mild serous drainage, no acute signs of infection. Left foot hallux distal tuft well adhered eschar, no acute signs of infection.       RADIOLOGY & ADDITIONAL TESTS:

## 2025-02-14 NOTE — PROGRESS NOTE ADULT - ASSESSMENT
64y Female with history of CHF presents as a transfer for LE CO2 angiogram. Nephrology consulted for elevated Scr.    1) JAMEL: likely due to ATN in addition to CRS. Scr relatively stable and near alvaro during admission despite LHC on 2/13. UA relatively bland. FeUrea low consistent with low flow state. Renal US unremarkable. Bladder scan on 2/3 negative. Avoid nephrotoxins.    2) HTN: BP low normal. Monitor off anti-hypertensive medications.    3) LE edema: Not secondary to nephrotic syndrome given subnephrotic range proteinuria. Continue with bumex gtt @ 3 mg/hour with metolazone, aldactone and HTS boluses. Plan to discontinue milrinone gtt given normal cardiac output during cardiac cath. TTE with severely decreased LVSF. Monitor UO.    4) Hyperphosphatemia: Resolved. Continue with low phosphorus diet.     Patient scheduled for LE angiogram today. No renal objection to proceed if performed as CO2 angiogram given risks of LILIAN.        [facility] NEPHROLOGY  Raji Waterman M.D.  Mars Feliz D.O.  Kelley Parks M.D.  MD Nancy Kulkarni, MSN, ANP-C    Telephone: [phone]  Facsimile: [phone] [address], [address]  [place] [address]

## 2025-02-14 NOTE — PROGRESS NOTE ADULT - ASSESSMENT
63 YO F with PMHx of HFrEF 30-35 and grade 2 ddfxn (last TTE on 01/16/25), DM2, and diabetic foot ulcer. Recent admission at [facility] for ADHF. Patient now represents to OSH and ultimately to [facility] as a transfer for worsening right leg pain and fluid secretion. As per documentation, patient was reported to have severe depletion of RLE blood flow beyond the popliteal vessel at knee and similar findings in the left. Patient was transferred to [facility] for CO2 angiogram with Dr. Baltazar. Of note, patient also with reported visual disturbance for which patient was seen and evaluated by opthalmology. Internal Medicine has been consulted on Ms. Kirby's care for medical management.     # ADHF/ BiV HFrEF 20   - Recent admission at [facility] for ADHF and on dobutamine outpatient  - TTE 1/16 with EF 30-35 with moderately reduced LVSF and grade 2 ddfxn, normal RVSF , trace TR/ W[place], and trace pericardial effusion  - NST abnormal with small-sized, moderate defect in apical wall that is predominantly fixed suggestive of an infarction with minimal marcus-infarct ischemia. Post stress LVEF 25.  - CT Chest with small BL pleural effusions and small pericardial effusion.  - RPT TTE with EF 20, severely decreased LV, decreased RVSF with TAPSE 1.2, and regional wall motion abnormalities present with entire septum, entire apex, entire inferior wall, mid inferolateral segment, and mid anterolateral segment are hypokinetic.   - RHC with RA 20, PA 49/29 (40), PCWP 35, mVO2 51.1, CO/CI 3.8/2.2, SVR 1095  - Continue on milrinone 0.5mcg GTT   - Continue on bumex 3mg GTT   - Continue on aldactone 50 QD   - Added augmentation with HTS and zaroxolyn 10 QD today   - Case discussed with EP and needs to be on GDMT for 3 months before AICD placement and should be stabilized off of inotropic support.   - Monitor I and O, diuresis per Cardio/ Renal    - GDMT as per Cardio  - Serial CXR   - Monitor volume status   - Check daily weights    - Monitor on telemetry; Monitor electrolytes   - Cardio, HF, Renal, and EP evals appreciated; F/u recs     # CAD with TVD   - LHC with RCA , severe ostial LM disease, diffuse disease in LAD and LCx, some L to R collaterals   - Pending CTSx evaluation for CABG    # BL LE Edema likely in setting of ADHF  - Diuresis as per Cardio and Renal   - BL LE elevation and compression  - LE Duplex neg   - Monitor for now  - Podiatry and Vascular evals appreciated; F/u recs    # Pericardial effusion likely in setting ADHF  - TTE with trace pericardial effusion   - CT Chest with small pericardial effusion   - Denies chest pain, palpitations, chest tightness or discomfort, shortness of breath or dyspnea   - Repeat TTE in 1 month for further evaluation   - Diuresis per cardio/ renal.  - Monitor on telemetry  - Cardio following    # Pleural effusion likely in setting ADHF  - CT Chest with small BL pleural effusions  - On RA with no respiratory complaints at present  - Monitor O2 saturation  - Supplement to maintain > 90%   - Diuresis per cardio/ renal.     # JAMEL with Hyponatremia and Metabolic Acidosis   - Baseline CRE 0.7-1.2 and increased to ~4  - As per OSH documentation, JAMEL was thought to be second to Unasyn/ Bumex / Entresto use and Hypotension   - US RENAL with no hydronephrosis  - CRE improving slightly with diuresis/ inotropic support and likely cardiorenal induced with low flow state in ADHF  - Continue with HF support as above  - Avoid nephrotoxic agents  - Monitor Cr and daily BMP   - Renal eval appreciated    # Leukocytosis likely in setting of BL LE ulcers with pop occlusion   - BCx negative   - UCx with probable contamination   - On unasyn for ABX. Frequency increased to Q12 as per Renal   - Leukocytosis up-trending. monitor closely. If febrile check pan cultures   - Trend CBC, temp curve, VS and adjust as tolerated    # Foot ulcers with worsening RLE pain second to pop artery occlusion +/- diabetic ulcers   - RLE Arterial Duplex with popliteal artery occlusion   - LLE Arterial Duplex with underlying disease cannot be excluded   - Patient transferred to [facility] for CO2 angiogram, however given ADHF currently not medically optimized and high risk. Plan to continue on diuresis and inotropic support as above   - Was on Heparin GTT for now and now on HSQ  - On Unasyn for ABX   - Continue on Lipitor  - Monitor PLT and HH while on AC   - Vascular following and given continued pain and slightly improving volume status discussing angio for next week   - Continue pain control with oxy  - Wound care called for dressing recs     # Tachycardia likely pain related   - On Toprol and improved  - Continue to monitor on tele   - Cardio eval appreciated    # Visual Disturbance  - CT Head w/ old infarcts  - On ASA and Statin   - Opthalmology eval appreciated    # Indigestion and Constipation   - PPI, miralax and senna continued  - MOnitor BMs    # Anemia likely mixed AOCD vs iron deficiency   - HH stable in 9s on HSQ  - Consider iron tabs when optimized   - Monitor HH   - Transfuse for Hgb < 8  - Maintain active T/S    # Diabetes Mellitus A1C 11.6   - Continue on lantus 10 with tradjecta 5 and ISS   - Endocrine following    # HLD and HLD  - Continue on lipitor and toprol  - Monitor BP    # DISPO TBD      Discussed with Attending and ACP

## 2025-02-14 NOTE — PROGRESS NOTE ADULT - ASSESSMENT
64F, hx of HFrEF (previously 30-35%, now 20%), never had LHC, DM, diabetic foot ulcer, severe PAD b/l, presenting initially for vascular angiogram, found to be in ADHF with volume overload. No lactate or other signs of poor perfusion other than JAMEL which initially improved.     RHC: RA 20, PA 49/29 (40), PCWP 35, mVO2 51.1, CO/CI 3.8/2.2, SVR 1095  LHC: RCA , severe ostial LM disease, diffuse disease in LAD and LCx, some L to R collaterals    Cardiac testing:  TTE 2/10/25: EF 20%, reduced RVSF  TTE 1/16/25: EF 30-35%, grade 2 DD  NST 1/24/25: small moderate defect in apical wall that is predominantly fixed    Home cardiac meds: toprol 25      #HFrEF, likely ischemic  -c/w bumex gtt 3/hr, with daily hypertonic saline 3% 150cc and metolazone 5  -iron sucrose 200mg IV q5 days  -replete K>4, Mg>2  -c/w spironolactone 25mg  -please get a standing weight, goal IO -2L    #CAD  -c/w ASA  -consult CT surgery for CABG evaluation once euvolemic vs high risk PCI vs medical management    #PAD  -vascular deferring CO2 angiogram to outpatient   64F, hx of HFrEF (previously 30-35%, now 20%), never had LHC, DM, diabetic foot ulcer, severe PAD b/l, presenting initially for vascular angiogram, found to be in ADHF with volume overload. No lactate or other signs of poor perfusion other than JAMEL which initially improved.     RHC: RA 20, PA 49/29 (40), PCWP 35, mVO2 51.1, CO/CI 3.8/2.2, SVR 1095  LHC: RCA , severe ostial LM disease, diffuse disease in LAD and LCx, some L to R collaterals    Cardiac testing:  TTE 2/10/25: EF 20%, reduced RVSF  TTE 1/16/25: EF 30-35%, grade 2 DD  NST 1/24/25: small moderate defect in apical wall that is predominantly fixed    Home cardiac meds: toprol 25    #HFrEF, likely ischemic  -mirinone gtt started  -c/w bumex gtt 3/hr  -add augmentation with hypertonic saline 3% 150cc IV TID and metolazone 10mg daily  -iron sucrose 200mg IV q5 days  -replete K>4, Mg>2  -increase meron to 50mg  -please get a standing weight, goal IO -2L    #CAD  -c/w ASA  -consult CT surgery for CABG evaluation vs high risk PCI vs medical management    #PAD  -vascular deferring CO2 angiogram to outpatient

## 2025-02-14 NOTE — PROGRESS NOTE ADULT - ASSESSMENT
64F presents with bilateral lower extremity wounds   - Patient seen and evaluated  - Afebrile, WBC 11.44  - Bilateral lower extremity venous stasis wounds to dermis, mild serous drainage, no acute signs of infection. Left foot hallux distal tuft well adhered eschar, no acute signs of infection.   - Bilateral foot xray: no OM, no gas   - No culture taken secondary to no acute signs of infection   - Vasc recs, appreciated, planning outpatient angio   - No acute pod intervention, local wound care only  - Pod stable for discharge   - Wound care instruction and follow up information in discharge note provider   - Discussed with attending 64F presents with bilateral lower extremity wounds   - Patient seen and evaluated  - Afebrile, WBC 11.44  - Bilateral lower extremity venous stasis wounds to dermis, mild serous drainage, no acute signs of infection. Left foot hallux distal tuft well adhered eschar, no acute signs of infection.   - Bilateral foot xray: no OM, no gas   - No culture taken secondary to no acute signs of infection   - Vasc recs, appreciated, planning outpatient angio   - No acute pod intervention, local wound care only  - Pod stable for discharge  - Please reconsult PRN  - Wound care instruction and follow up information in discharge note provider   - Discussed with attending

## 2025-02-15 DIAGNOSIS — R06.02 SHORTNESS OF BREATH: ICD-10-CM

## 2025-02-15 DIAGNOSIS — I70.209 UNSPECIFIED ATHEROSCLEROSIS OF NATIVE ARTERIES OF EXTREMITIES, UNSPECIFIED EXTREMITY: ICD-10-CM

## 2025-02-15 DIAGNOSIS — J90 PLEURAL EFFUSION, NOT ELSEWHERE CLASSIFIED: ICD-10-CM

## 2025-02-15 LAB
ANION GAP SERPL CALC-SCNC: 17 MMOL/L — SIGNIFICANT CHANGE UP (ref 5–17)
ANION GAP SERPL CALC-SCNC: 22 MMOL/L — HIGH (ref 5–17)
BUN SERPL-MCNC: 61 MG/DL — HIGH (ref 7–23)
BUN SERPL-MCNC: 62 MG/DL — HIGH (ref 7–23)
CALCIUM SERPL-MCNC: 10 MG/DL — SIGNIFICANT CHANGE UP (ref 8.4–10.5)
CALCIUM SERPL-MCNC: 9.7 MG/DL — SIGNIFICANT CHANGE UP (ref 8.4–10.5)
CHLORIDE SERPL-SCNC: 94 MMOL/L — LOW (ref 96–108)
CHLORIDE SERPL-SCNC: 94 MMOL/L — LOW (ref 96–108)
CO2 SERPL-SCNC: 25 MMOL/L — SIGNIFICANT CHANGE UP (ref 22–31)
CO2 SERPL-SCNC: 27 MMOL/L — SIGNIFICANT CHANGE UP (ref 22–31)
CREAT SERPL-MCNC: 2.74 MG/DL — HIGH (ref 0.5–1.3)
CREAT SERPL-MCNC: 2.8 MG/DL — HIGH (ref 0.5–1.3)
EGFR: 18 ML/MIN/1.73M2 — LOW
EGFR: 18 ML/MIN/1.73M2 — LOW
EGFR: 19 ML/MIN/1.73M2 — LOW
EGFR: 19 ML/MIN/1.73M2 — LOW
GLUCOSE BLDC GLUCOMTR-MCNC: 128 MG/DL — HIGH (ref 70–99)
GLUCOSE BLDC GLUCOMTR-MCNC: 167 MG/DL — HIGH (ref 70–99)
GLUCOSE BLDC GLUCOMTR-MCNC: 170 MG/DL — HIGH (ref 70–99)
GLUCOSE BLDC GLUCOMTR-MCNC: 186 MG/DL — HIGH (ref 70–99)
GLUCOSE SERPL-MCNC: 117 MG/DL — HIGH (ref 70–99)
GLUCOSE SERPL-MCNC: 150 MG/DL — HIGH (ref 70–99)
HCT VFR BLD CALC: 25.3 % — LOW (ref 34.5–45)
HGB BLD-MCNC: 8.5 G/DL — LOW (ref 11.5–15.5)
MCHC RBC-ENTMCNC: 24.5 PG — LOW (ref 27–34)
MCHC RBC-ENTMCNC: 33.6 G/DL — SIGNIFICANT CHANGE UP (ref 32–36)
MCV RBC AUTO: 72.9 FL — LOW (ref 80–100)
NRBC BLD AUTO-RTO: 0 /100 WBCS — SIGNIFICANT CHANGE UP (ref 0–0)
PLATELET # BLD AUTO: 385 K/UL — SIGNIFICANT CHANGE UP (ref 150–400)
POTASSIUM SERPL-MCNC: 3.6 MMOL/L — SIGNIFICANT CHANGE UP (ref 3.5–5.3)
POTASSIUM SERPL-MCNC: 4.2 MMOL/L — SIGNIFICANT CHANGE UP (ref 3.5–5.3)
POTASSIUM SERPL-SCNC: 3.6 MMOL/L — SIGNIFICANT CHANGE UP (ref 3.5–5.3)
POTASSIUM SERPL-SCNC: 4.2 MMOL/L — SIGNIFICANT CHANGE UP (ref 3.5–5.3)
RBC # BLD: 3.47 M/UL — LOW (ref 3.8–5.2)
RBC # FLD: 16.9 % — HIGH (ref 10.3–14.5)
SODIUM SERPL-SCNC: 138 MMOL/L — SIGNIFICANT CHANGE UP (ref 135–145)
SODIUM SERPL-SCNC: 141 MMOL/L — SIGNIFICANT CHANGE UP (ref 135–145)
WBC # BLD: 16.56 K/UL — HIGH (ref 3.8–10.5)
WBC # FLD AUTO: 16.56 K/UL — HIGH (ref 3.8–10.5)

## 2025-02-15 RX ADMIN — MILRINONE LACTATE 5.41 MICROGRAM(S)/KG/MIN: 1 INJECTION, SOLUTION INTRAVENOUS at 22:50

## 2025-02-15 RX ADMIN — OXYCODONE HYDROCHLORIDE 10 MILLIGRAM(S): 30 TABLET ORAL at 02:56

## 2025-02-15 RX ADMIN — SODIUM CHLORIDE 300 MILLILITER(S): 3 INJECTION, SOLUTION INTRAVENOUS at 06:40

## 2025-02-15 RX ADMIN — Medication 5 MILLIGRAM(S): at 17:25

## 2025-02-15 RX ADMIN — INSULIN LISPRO 1: 100 INJECTION, SOLUTION INTRAVENOUS; SUBCUTANEOUS at 18:05

## 2025-02-15 RX ADMIN — SODIUM CHLORIDE 300 MILLILITER(S): 3 INJECTION, SOLUTION INTRAVENOUS at 11:02

## 2025-02-15 RX ADMIN — Medication 40 MILLIGRAM(S): at 11:06

## 2025-02-15 RX ADMIN — Medication 5 MILLIGRAM(S): at 06:40

## 2025-02-15 RX ADMIN — Medication 650 MILLIGRAM(S): at 22:50

## 2025-02-15 RX ADMIN — INSULIN GLARGINE-YFGN 10 UNIT(S): 100 INJECTION, SOLUTION SUBCUTANEOUS at 22:00

## 2025-02-15 RX ADMIN — OXYCODONE HYDROCHLORIDE 10 MILLIGRAM(S): 30 TABLET ORAL at 01:56

## 2025-02-15 RX ADMIN — Medication 650 MILLIGRAM(S): at 13:15

## 2025-02-15 RX ADMIN — OXYCODONE HYDROCHLORIDE 10 MILLIGRAM(S): 30 TABLET ORAL at 11:06

## 2025-02-15 RX ADMIN — HEPARIN SODIUM 5000 UNIT(S): 1000 INJECTION INTRAVENOUS; SUBCUTANEOUS at 06:38

## 2025-02-15 RX ADMIN — MILRINONE LACTATE 5.41 MICROGRAM(S)/KG/MIN: 1 INJECTION, SOLUTION INTRAVENOUS at 08:30

## 2025-02-15 RX ADMIN — SODIUM CHLORIDE 300 MILLILITER(S): 3 INJECTION, SOLUTION INTRAVENOUS at 17:26

## 2025-02-15 RX ADMIN — HEPARIN SODIUM 5000 UNIT(S): 1000 INJECTION INTRAVENOUS; SUBCUTANEOUS at 22:00

## 2025-02-15 RX ADMIN — Medication 2 TABLET(S): at 22:02

## 2025-02-15 RX ADMIN — AMPICILLIN SODIUM AND SULBACTAM SODIUM 200 GRAM(S): 1; .5 INJECTION, POWDER, FOR SOLUTION INTRAMUSCULAR; INTRAVENOUS at 09:50

## 2025-02-15 RX ADMIN — POLYETHYLENE GLYCOL 3350 17 GRAM(S): 17 POWDER, FOR SOLUTION ORAL at 11:06

## 2025-02-15 RX ADMIN — IRON SUCROSE 110 MILLIGRAM(S): 20 INJECTION, SOLUTION INTRAVENOUS at 11:02

## 2025-02-15 RX ADMIN — Medication 650 MILLIGRAM(S): at 22:01

## 2025-02-15 RX ADMIN — OXYCODONE HYDROCHLORIDE 10 MILLIGRAM(S): 30 TABLET ORAL at 22:50

## 2025-02-15 RX ADMIN — Medication 3 MILLIGRAM(S): at 22:01

## 2025-02-15 RX ADMIN — AMPICILLIN SODIUM AND SULBACTAM SODIUM 200 GRAM(S): 1; .5 INJECTION, POWDER, FOR SOLUTION INTRAMUSCULAR; INTRAVENOUS at 22:00

## 2025-02-15 RX ADMIN — ATORVASTATIN CALCIUM 40 MILLIGRAM(S): 80 TABLET, FILM COATED ORAL at 22:02

## 2025-02-15 RX ADMIN — Medication 50 MILLIGRAM(S): at 06:40

## 2025-02-15 RX ADMIN — BUMETANIDE 15 MG/HR: 1 TABLET ORAL at 08:30

## 2025-02-15 RX ADMIN — HEPARIN SODIUM 5000 UNIT(S): 1000 INJECTION INTRAVENOUS; SUBCUTANEOUS at 13:38

## 2025-02-15 RX ADMIN — OXYCODONE HYDROCHLORIDE 10 MILLIGRAM(S): 30 TABLET ORAL at 23:41

## 2025-02-15 RX ADMIN — Medication 81 MILLIGRAM(S): at 11:06

## 2025-02-15 RX ADMIN — BUMETANIDE 15 MG/HR: 1 TABLET ORAL at 22:50

## 2025-02-15 RX ADMIN — Medication 650 MILLIGRAM(S): at 12:15

## 2025-02-15 RX ADMIN — OXYCODONE HYDROCHLORIDE 10 MILLIGRAM(S): 30 TABLET ORAL at 12:06

## 2025-02-15 RX ADMIN — INSULIN LISPRO 1: 100 INJECTION, SOLUTION INTRAVENOUS; SUBCUTANEOUS at 12:12

## 2025-02-15 RX ADMIN — LINAGLIPTIN 5 MILLIGRAM(S): 5 TABLET, FILM COATED ORAL at 11:06

## 2025-02-15 NOTE — PROGRESS NOTE ADULT - SUBJECTIVE AND OBJECTIVE BOX
[facility] NEPHROLOGY- PROGRESS NOTE    64y Female with history of CHF presents as a transfer for LE CO2 angiogram. Nephrology consulted for elevated Scr.    REVIEW OF SYSTEMS:  Gen: no fevers  Cards: no chest pain  Resp: + dyspnea with exertion, + cough  GI: no nausea and vomiting, no diarrhea  Vascular: + LE edema improving.   Msk: Pain upon moving legs.    No Known Allergies      Hospital Medications: Medications reviewed        VITALS:  T(F): 97.9 (02-15-25 @ 17:19), Max: 98.7 (02-14-25 @ 21:44)  HR: 96 (02-15-25 @ 17:19)  BP: 99/60 (02-15-25 @ 17:19)  RR: 18 (02-15-25 @ 17:19)  SpO2: 95% (02-15-25 @ 17:19)  Wt(kg): --    02-14 @ 07:01  -  02-15 @ 07:00  --------------------------------------------------------  IN: 1299.2 mL / OUT: 1675 mL / NET: -375.8 mL    02-15 @ 07:01  -  02-15 @ 17:33  --------------------------------------------------------  IN: 853.6 mL / OUT: 1450 mL / NET: -596.4 mL        PHYSICAL EXAM:    Gen: NAD, calm  Cards: RRR, +S1/S2, no M/G/R  Resp: bibasilar rales  GI: soft, NT/ND, NABS  Vascular: 2+ RLE edema > LLE edema with legs wrapped      LABS:  02-15    138  |  94[L]  |  62[H]  ----------------------------<  150[H]  3.6   |  27  |  2.80[H]    Ca    9.7      15 Feb 2025 15:52  Phos  4.1     02-14  Mg     2.1     02-14      Creatinine Trend: 2.80 <--, 2.74 <--, 2.40 <--, 2.39 <--, 2.37 <--, 2.40 <--, 2.50 <--, 2.39 <--, 2.49 <--, 2.42 <--, 2.29 <--, 2.07 <--                        8.5    16.56 )-----------( 385      ( 15 Feb 2025 07:22 )             25.3     Urine Studies:  Urinalysis Basic - ( 15 Feb 2025 15:52 )    Color:  / Appearance:  / SG:  / pH:   Gluc: 150 mg/dL / Ketone:   / Bili:  / Urobili:    Blood:  / Protein:  / Nitrite:    Leuk Esterase:  / RBC:  / WBC    Sq Epi:  / Non Sq Epi:  / Bacteria:

## 2025-02-15 NOTE — PROGRESS NOTE ADULT - SUBJECTIVE AND OBJECTIVE BOX
MR#[id number]  PATIENT NAME:COLE ALBA    DATE OF SERVICE: 02-15-25 @ 07:28  Patient was seen and examined by Yordy Gilmore MD on    02-15-25 @ 07:28 .  Interim events noted.Consultant notes ,Labs,Telemetry reviewed by me       HOSPITAL COURSE: HPI:  64F, hx of CHF (last TTE on 1/16/25 EF 30-35%, G2DD), DM, diabetic foot ulcer with a recent admission at [facility] for CHF exacerbation presented as a transfer for worsening R. leg pain and fluid secretion, On non-invasive imaging demonstrating severe depletion of RLE blood flow beyond the popliteal vessel at knee and similar findings in L. Was transferred to [facility] for CO2 angiogram with Dr. Baltazar. (29 Jan 2025 20:05)    Transferred from [facility]-TTE on previous admission: LVSF moderately decreased w/EF 30-35%. Moderate G2DD. Mild MR. Ischemic evaluation was recommended for which patient undergone stress test which revealed a small-sized moderate defect in the apical wall that is predominantly fixed suggestive of an infarction with minimal marcus-infarct ischemia. 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 Left leg Arterial Duplex: Slightly tardus parvus waveform of the left popliteal artery is noted, for which underlying disease cannot be excluded. Posterior tibial artery waveform is nonpulsatile. Anterior tibial artery tardus parvus flow is noted. Patient started on Heparin and Dobutamine drip with transfer to [facility] for further management.        INTERIM EVENTS:Patient seen at bedside ,interim events noted.  Awake alert remains on Bumex gtt and  gtt at 2.5mcg CO2 Angiogram postponed to next week-she is still orthopneac  02/08-on  5mcg and Bumex gtt   02/10-OOB still orthopneac [id number] LE weakness and swelling  02/11-c/o LE pain not dyspneac  held on Bumex 3 mg/Hr  02/12-Awake seen by HF   02/13-Remains on Bumex 3mg/Hr for possible LHC/RHC  02/14-Had cath Multivessel CAD started on Milrinone for CTS evaluation albeit targets not optimal  02/15-Awake on Milrinone and Bumex gtt-seen by CTS not a surgical candidate-needs Vascular work-up for LE PAD-scheduled for Angiogram on Wednesday    Weight 170Kg---> 164 Kg    PMH -reviewed admission note, no change since admission  HEART FAILURE: Acute[x ]Chronic[x ] Systolic[x ] Diastolic[ ] Combined Systolic and Diastolic[ ]  CAD[ ] CABG[ ] PCI[ ]  DEVICES[ ] PPM[ ] ICD[ ] ILR[ ]  ATRIAL FIBRILLATION[ ] Paroxysmal[ ] Permanent[ ] CHADS2-[  ]  JAMEL[ ] CKD1[ ] CKD2[ ] CKD3[ ] CKD4[ ] ESRD[ ]  COPD[ ] HTN[ ]   DM[ ] Type1[ ] Type 2[ ]   CVA[ ] Paresis[ ]    AMBULATION: Assisted[ ] Cane/walker[ ] Independent[ x]          MEDICATIONS  (STANDING):  ampicillin/sulbactam  IVPB 3 Gram(s) IV Intermittent every 12 hours  ampicillin/sulbactam  IVPB      aspirin enteric coated 81 milliGRAM(s) Oral daily  atorvastatin 40 milliGRAM(s) Oral at bedtime  benzocaine/menthol Lozenge 1 Lozenge Oral once  bisacodyl 5 milliGRAM(s) Oral every 12 hours  buMETAnide Infusion 3 mG/Hr (15 mL/Hr) IV Continuous <Continuous>  glucagon  Injectable 1 milliGRAM(s) IntraMuscular once  heparin   Injectable 5000 Unit(s) SubCutaneous every 8 hours  insulin glargine Injectable (LANTUS) 10 Unit(s) SubCutaneous at bedtime  insulin lispro (ADMELOG) corrective regimen sliding scale   SubCutaneous three times a day before meals  insulin lispro (ADMELOG) corrective regimen sliding scale   SubCutaneous at bedtime  iron sucrose IVPB 200 milliGRAM(s) IV Intermittent every 24 hours  linagliptin 5 milliGRAM(s) Oral daily  metolazone 10 milliGRAM(s) Oral daily  milrinone Infusion 0.25 MICROgram(s)/kG/Min (5.41 mL/Hr) IV Continuous <Continuous>  pantoprazole  Injectable 40 milliGRAM(s) IV Push daily  polyethylene glycol 3350 17 Gram(s) Oral daily  senna 2 Tablet(s) Oral at bedtime  sodium chloride 3% Bolus 150 milliLiter(s) IV Bolus <User Schedule>  spironolactone 50 milliGRAM(s) Oral daily    MEDICATIONS  (PRN):  acetaminophen     Tablet .. 650 milliGRAM(s) Oral every 6 hours PRN Mild Pain (1 - 3)  dextrose Oral Gel 15 Gram(s) Oral once PRN Blood Glucose LESS THAN 70 milliGRAM(s)/deciliter  melatonin 3 milliGRAM(s) Oral at bedtime PRN Insomnia  ondansetron Injectable 4 milliGRAM(s) IV Push every 6 hours PRN Nausea and/or Vomiting  oxyCODONE    IR 10 milliGRAM(s) Oral every 4 hours PRN Severe Pain (7 - 10)            REVIEW OF SYSTEMS:  Constitutional: [ ] fever, [ ]weight loss,  [ x]fatigue [ ]weight gain  Eyes: [ ] visual changes  Respiratory: [ ]shortness of breath;  [ ] cough, [ ]wheezing, [ ]chills, [ ]hemoptysis  Cardiovascular: [ ] chest pain, [ ]palpitations, [ ]dizziness,  [x ]leg swelling[ ]orthopnea[ ]PND  Gastrointestinal: [ ] abdominal pain, [ ]nausea, [ ]vomiting,  [ ]diarrhea [ ]Constipation [ ]Melena  Genitourinary: [ ] dysuria, [ ] hematuria [ ][place]  Neurologic: [ ] headaches [ ] tremors[x ]weakness [ ]Paralysis Right[ ] Left[ ]  Skin: [ ] itching, [ ]burning, [ ] rashes  Endocrine: [ ] heat or cold intolerance  Musculoskeletal: [ ] joint pain or swelling; [ ] muscle, back, or extremity pain  Psychiatric: [ ] depression, [ ]anxiety, [ ]mood swings, or [ ]difficulty sleeping  Hematologic: [ ] easy bruising, [ ] bleeding gums    [ ] All remaining systems negative except as per above.   [ ]Unable to obtain.  [x] No change in ROS since admission      Vital Signs Last 24 Hrs  T(C): 36.8 (15 Feb 2025 05:04), Max: 37.3 (14 Feb 2025 17:08)  T(F): 98.2 (15 Feb 2025 05:04), Max: 99.1 (14 Feb 2025 17:08)  HR: 105 (15 Feb 2025 05:04) (95 - 105)  BP: 105/65 (15 Feb 2025 05:04) (103/67 - 112/69)  RR: 18 (15 Feb 2025 05:04) (18 - 18)  SpO2: 95% (15 Feb 2025 05:04) (95% - 98%)    Parameters below as of 15 Feb 2025 05:04  Patient On (Oxygen Delivery Method): room air      I&O's Summary    14 Feb 2025 07:01  -  15 Feb 2025 07:00  --------------------------------------------------------  IN: 1299.2 mL / OUT: 1675 mL / NET: -375.8 mL        PHYSICAL EXAM:  General: No acute distress BMI-25  HEENT: EOMI, PERRL  Neck: Supple, [ ] JVD  Lungs: Equal air entry bilaterally; [ ] rales [ ] wheezing [ ] rhonchi  Heart: Regular rate and rhythm; [x ] murmur   2/6 [ x] systolic [ ] diastolic [ ] radiation[ ] rubs [ ]  gallops  Abdomen: Nontender, bowel sounds present  Extremities: No clubbing, cyanosis, [x ] edema [x ]Bilateral lower extremity venous stasis wounds to dermis, mild serous drainage, no acute signs of infection. Left foot hallux distal tuft well adhered eschar, no acute signs of infection.   Nervous system:  Alert & Oriented X3, no focal deficits  Psychiatric: Normal affect  Skin: No rashes or lesions    LABS:  02-14    139  |  92[L]  |  55[H]  ----------------------------<  137[H]  3.4[L]   |  29  |  2.40[H]    Ca    9.7      14 Feb 2025 16:09  Phos  4.1     02-14  Mg     2.1     02-14      Creatinine Trend: 2.40<--, 2.39<--, 2.37<--, 2.40<--, 2.50<--, 2.39<--                        8.6    12.94 )-----------( 362      ( 14 Feb 2025 06:29 )             25.3          TTE W or WO Ultrasound Enhancing Agent (02.10.25 @ 12:09) >  CONCLUSIONS:      1. Left ventricular cavity is mildly dilated. Left ventricular systolic function is severely decreased with an ejection fraction of 20 % by Winchester's method of disks. Regional wall motion abnormalities present.   2. Multiple segmental abnormalities exist. See findings.   The entire septum, entire apex, entire inferior wall, mid inferolateral segment, and mid anterolateral segment are hypokinetic. All remaining scored segments are normal.     3. Reduced right ventricular systolic function.   4. No pericardial effusion seen.   5. Compared to the transthoracic echocardiogram performed on 1/16/2025, Worsening of the left ventricular function.        VA Duplex Lower Ext Vein Scan, Bilat (02.10.25 @ 17:42) >  IMPRESSION: No evidence of bilateral DVT within the imaged veins.        Nuclear Stress Test-Pharmacologic.. (01.24.25 @ 10:31) >  Conclusions:   1. Myocardial Perfusion: Abnormal.   2. Qualitative Perfusion:      - small-sized, moderate defect(s) in the apical wall that is predominantly fixed suggestive of an infarction with minimal marcus-infarct ischemia.   3. The post stress left ventricular EF is 25 %. The stress end diastolic volume is 118 ml.   4. Results D/Wcardiologist Dr. Gilmore at 18:31        RHC: RA 20, PA 49/29 (40), PCWP 35, mVO2 51.1, CO/CI 3.8/2.2, SVR 1095  LHC: RCA , severe ostial LM disease, diffuse disease in LAD and LCx, some L to R collaterals

## 2025-02-15 NOTE — PROGRESS NOTE ADULT - ASSESSMENT
63 YO F with PMHx of HFrEF 30-35 and grade 2 ddfxn (last TTE on 01/16/25), DM2, and diabetic foot ulcer. Recent admission at [facility] for ADHF. Patient now represents to OSH and ultimately to [facility] as a transfer for worsening right leg pain and fluid secretion. As per documentation, patient was reported to have severe depletion of RLE blood flow beyond the popliteal vessel at knee and similar findings in the left. Patient was transferred to [facility] for CO2 angiogram with Dr. Baltazar. Of note, patient also with reported visual disturbance for which patient was seen and evaluated by opthalmology. Internal Medicine has been consulted on Ms. Kirby's care for medical management.     # ADHF/ BiV HFrEF 20   - Recent admission at [facility] for ADHF and on dobutamine outpatient  - TTE 1/16 with EF 30-35 with moderately reduced LVSF and grade 2 ddfxn, normal RVSF , trace TR/ NJ, and trace pericardial effusion  - NST abnormal with small-sized, moderate defect in apical wall that is predominantly fixed suggestive of an infarction with minimal marcus-infarct ischemia. Post stress LVEF 25.  - CT Chest with small BL pleural effusions and small pericardial effusion.  - RPT TTE with EF 20, severely decreased LV, decreased RVSF with TAPSE 1.2, and regional wall motion abnormalities present with entire septum, entire apex, entire inferior wall, mid inferolateral segment, and mid anterolateral segment are hypokinetic.   - RHC with RA 20, PA 49/29 (40), PCWP 35, mVO2 51.1, CO/CI 3.8/2.2, SVR 1095  - Continue on milrinone 0.5mcg GTT   - Continue on bumex 3mg GTT   - Continue on aldactone 50 QD   - Added augmentation with HTS and zaroxolyn 10 QD today   - Case discussed with EP and needs to be on GDMT for 3 months before AICD placement and should be stabilized off of inotropic support.   - Monitor I and O, diuresis per Cardio/ Renal    - GDMT as per Cardio  - Serial CXR   - Monitor volume status   - Check daily weights    - Monitor on telemetry; Monitor electrolytes   - Cardio, HF, Renal, and EP evals appreciated; F/u recs     # CAD with TVD   - LHC with RCA , severe ostial LM disease, diffuse disease in LAD and LCx, some L to R collaterals   - Pending CTSx evaluation for CABG    # BL LE Edema likely in setting of ADHF  - Diuresis as per Cardio and Renal   - BL LE elevation and compression  - LE Duplex neg   - Monitor for now  - Podiatry and Vascular evals appreciated; F/u recs    # Pericardial effusion likely in setting ADHF  - TTE with trace pericardial effusion   - CT Chest with small pericardial effusion   - Denies chest pain, palpitations, chest tightness or discomfort, shortness of breath or dyspnea   - Repeat TTE in 1 month for further evaluation   - Diuresis per cardio/ renal.  - Monitor on telemetry  - Cardio following    # Pleural effusion likely in setting ADHF  - CT Chest with small BL pleural effusions  - On RA with no respiratory complaints at present  - Monitor O2 saturation  - Supplement to maintain > 90%   - Diuresis per cardio/ renal.     # JAMEL with Hyponatremia and Metabolic Acidosis   - Baseline CRE 0.7-1.2 and increased to ~4  - As per OSH documentation, JAMEL was thought to be second to Unasyn/ Bumex / Entresto use and Hypotension   - US RENAL with no hydronephrosis  - CRE improving slightly with diuresis/ inotropic support and likely cardiorenal induced with low flow state in ADHF  - Continue with HF support as above  - Avoid nephrotoxic agents  - Monitor Cr and daily BMP   - Renal eval appreciated    # Leukocytosis likely in setting of BL LE ulcers with pop occlusion   - BCx negative   - UCx with probable contamination   - On unasyn for ABX. Frequency increased to Q12 as per Renal   - Leukocytosis up-trending. monitor closely. If febrile check pan cultures   - Trend CBC, temp curve, VS and adjust as tolerated    # Foot ulcers with worsening RLE pain second to pop artery occlusion +/- diabetic ulcers   - RLE Arterial Duplex with popliteal artery occlusion   - LLE Arterial Duplex with underlying disease cannot be excluded   - Patient transferred to [facility] for CO2 angiogram, however given ADHF currently not medically optimized and high risk. Plan to continue on diuresis and inotropic support as above   - Was on Heparin GTT for now and now on HSQ  - On Unasyn for ABX   - Continue on Lipitor  - Monitor PLT and HH while on AC   - Vascular following and given continued pain and slightly improving volume status discussing angio for next week   - Continue pain control with oxy  - Wound care called for dressing recs     # Tachycardia likely pain related   - On Toprol and improved  - Continue to monitor on tele   - Cardio eval appreciated    # Visual Disturbance  - CT Head w/ old infarcts  - On ASA and Statin   - Opthalmology eval appreciated    # Indigestion and Constipation   - PPI, miralax and senna continued  - MOnitor BMs    # Anemia likely mixed AOCD vs iron deficiency   - HH stable in 9s on HSQ  - Consider iron tabs when optimized   - Monitor HH   - Transfuse for Hgb < 8  - Maintain active T/S    # Diabetes Mellitus A1C 11.6   - Continue on lantus 10 with tradjecta 5 and ISS   - Endocrine following    # HLD and HLD  - Continue on lipitor and toprol  - Monitor BP    # DISPO TBD

## 2025-02-15 NOTE — CONSULT NOTE ADULT - ASSESSMENT
65 y/o F with PMH of CHF (last TTE 1/2025 with EF 30-35%), DM, diabetic foot ulcer, PAD, CAD. Recent admission at [facility] for CHF exacerbation, presented to [facility] as a transfer for worsening R leg pain. Hospital course c/b acute on chronic CHF - TTE with EF 20%, severely decreased LV and RV function, multiple regional motion abnormalities, s/p LHC revealing TVD, pleural/pericardial effusions, JAMEL, leukocytosis suspected to be in the setting of LE wound on IV ABX, persistent RLE pain w/ RLE Arterial Duplex revealing popliteal artery occlusion  Plan for eventual angiogram with vascular when medically optimized. Pulmonary called to consult as pt with c/o SOB.

## 2025-02-15 NOTE — CONSULT NOTE ADULT - SUBJECTIVE AND OBJECTIVE BOX
PULMONARY CONSULT    HPI: 65 y/o F with PMH of CHF (last TTE 1/2025 with EF 30-35%), DM, diabetic foot ulcer, PAD, CAD. Recent admission at [facility] for CHF exacerbation, presented to [facility] as a transfer for worsening R leg pain. Hospital course c/b acute on chronic CHF - TTE with EF 20%, severely decreased LV and RV function, multiple regional motion abnormalities, s/p LHC revealing TVD, pleural/pericardial effusions, JAMEL, leukocytosis suspected to be in the setting of LE wound on IV ABX, persistent LE pain w/ RLE Arterial Duplex revealing popliteal artery occlusion  Plan for eventual angiogram with vascular when medically optimized. Pulmonary called to consult as pt with c/o SOB.       PAST MEDICAL & SURGICAL HISTORY:  DM (diabetes mellitus)  Diabetic foot ulcer  Congestive heart failure (CHF)  CAD (coronary artery disease)  Cataract of both eyes, unspecified cataract type  History of cholecystectomy        Allergies    No Known Allergies    Intolerances    Augmentin (Stomach Upset; Vomiting; Nausea)    FAMILY HISTORY:  Family history of CHF (congestive heart failure) (Mother)    Social history:     Review of Systems:  CONSTITUTIONAL: No fever, chills, or fatigue  EYES: No eye pain, visual disturbances, or discharge  ENMT:  No difficulty hearing, tinnitus, vertigo; No sinus or throat pain  NECK: No pain or stiffness  RESPIRATORY: Per above  CARDIOVASCULAR: No chest pain, palpitations, dizziness, or leg swelling  GASTROINTESTINAL: No abdominal or epigastric pain. No nausea, vomiting, or hematemesis; No diarrhea or constipation. No melena or hematochezia.  GENITOURINARY: No dysuria, frequency, hematuria, or incontinence  NEUROLOGICAL: No headaches, memory loss, loss of strength, numbness, or tremors  SKIN: No itching, burning, rashes, or lesions   MUSCULOSKELETAL: No joint pain or swelling; No muscle, back, or extremity pain  PSYCHIATRIC: No depression, anxiety, mood swings, or difficulty sleeping    Medications:  MEDICATIONS  (STANDING):  ampicillin/sulbactam  IVPB 3 Gram(s) IV Intermittent every 12 hours  ampicillin/sulbactam  IVPB      aspirin enteric coated 81 milliGRAM(s) Oral daily  atorvastatin 40 milliGRAM(s) Oral at bedtime  benzocaine/menthol Lozenge 1 Lozenge Oral once  bisacodyl 5 milliGRAM(s) Oral every 12 hours  buMETAnide Infusion 3 mG/Hr (15 mL/Hr) IV Continuous <Continuous>  dextrose 5%. 1000 milliLiter(s) (50 mL/Hr) IV Continuous <Continuous>  dextrose 5%. 1000 milliLiter(s) (100 mL/Hr) IV Continuous <Continuous>  dextrose 50% Injectable 25 Gram(s) IV Push once  dextrose 50% Injectable 25 Gram(s) IV Push once  dextrose 50% Injectable 12.5 Gram(s) IV Push once  glucagon  Injectable 1 milliGRAM(s) IntraMuscular once  heparin   Injectable 5000 Unit(s) SubCutaneous every 8 hours  insulin glargine Injectable (LANTUS) 10 Unit(s) SubCutaneous at bedtime  insulin lispro (ADMELOG) corrective regimen sliding scale   SubCutaneous three times a day before meals  insulin lispro (ADMELOG) corrective regimen sliding scale   SubCutaneous at bedtime  iron sucrose IVPB 200 milliGRAM(s) IV Intermittent every 24 hours  linagliptin 5 milliGRAM(s) Oral daily  metolazone 10 milliGRAM(s) Oral daily  milrinone Infusion 0.25 MICROgram(s)/kG/Min (5.41 mL/Hr) IV Continuous <Continuous>  pantoprazole  Injectable 40 milliGRAM(s) IV Push daily  polyethylene glycol 3350 17 Gram(s) Oral daily  senna 2 Tablet(s) Oral at bedtime  sodium chloride 3% Bolus 150 milliLiter(s) IV Bolus <User Schedule>  spironolactone 50 milliGRAM(s) Oral daily    MEDICATIONS  (PRN):  acetaminophen     Tablet .. 650 milliGRAM(s) Oral every 6 hours PRN Mild Pain (1 - 3)  dextrose Oral Gel 15 Gram(s) Oral once PRN Blood Glucose LESS THAN 70 milliGRAM(s)/deciliter  melatonin 3 milliGRAM(s) Oral at bedtime PRN Insomnia  ondansetron Injectable 4 milliGRAM(s) IV Push every 6 hours PRN Nausea and/or Vomiting  oxyCODONE    IR 10 milliGRAM(s) Oral every 4 hours PRN Severe Pain (7 - 10)            Vital Signs Last 24 Hrs  T(C): 36.8 (15 Feb 2025 05:04), Max: 37.3 (14 Feb 2025 17:08)  T(F): 98.2 (15 Feb 2025 05:04), Max: 99.1 (14 Feb 2025 17:08)  HR: 105 (15 Feb 2025 05:04) (95 - 105)  BP: 105/65 (15 Feb 2025 05:04) (103/67 - 112/69)  BP(mean): --  RR: 18 (15 Feb 2025 05:04) (18 - 18)  SpO2: 95% (15 Feb 2025 05:04) (95% - 98%)    Parameters below as of 15 Feb 2025 05:04  Patient On (Oxygen Delivery Method): room air          VBG pH 7.36 02-14 @ 16:00  VBG pCO2 59 02-14 @ 16:00  VBG O2 sat 35.8 02-14 @ 16:00  VBG lactate 1.4 02-14 @ 16:00        02-14 @ 07:01  -  02-15 @ 07:00  --------------------------------------------------------  IN: 1299.2 mL / OUT: 1675 mL / NET: -375.8 mL          LABS:                        8.5    16.56 )-----------( 385      ( 15 Feb 2025 07:22 )             25.3     02-14    139  |  92[L]  |  55[H]  ----------------------------<  137[H]  3.4[L]   |  29  |  2.40[H]    Ca    9.7      14 Feb 2025 16:09  Phos  4.1     02-14  Mg     2.1     02-14            CAPILLARY BLOOD GLUCOSE      POCT Blood Glucose.: 128 mg/dL (15 Feb 2025 08:23)      Urinalysis Basic - ( 14 Feb 2025 16:09 )    Color: x / Appearance: x / SG: x / pH: x  Gluc: 137 mg/dL / Ketone: x  / Bili: x / Urobili: x   Blood: x / Protein: x / Nitrite: x   Leuk Esterase: x / RBC: x / WBC x   Sq Epi: x / Non Sq Epi: x / Bacteria: x    Physical Examination:    General: No acute distress.      HEENT: Pupils equal, reactive to light.  Symmetric.    PULM: Clear to auscultation bilaterally, no significant sputum production    CVS: S1, S2    ABD: Soft, nondistended, nontender, normoactive bowel sounds, no masses    EXT: No edema, nontender    SKIN: Warm and well perfused, no rashes noted.    NEURO: Alert, oriented, interactive, nonfocal    RADIOLOGY REVIEWED  CXR: bibasilar atelectasis/effusions     CT chest:< from: CT Chest No Cont (01.25.25 @ 14:08) >  ACC: [id number] EXAM:  CT CHEST   ORDERED BY: TED MONROY     PROCEDURE DATE:  01/25/2025        INTERPRETATION:  Clinical information: Shortness of breath.    CT scan of the chest was obtained without administration of intravenous   contrast.    Several lymph nodes are present in the mediastinum.    Heart is enlarged in size. Calcification of the coronary arteries is   noted. Small pericardial effusion is noted.    No endobronchial lesions are noted. Patchy groundglass opacities noted   within both lungs are felt to be secondary to expiratory   technique/breathing artifact. Atelectasis is noted involving portions of   both lower lobes. This is secondary to small bilateral pleural effusions.    Below the diaphragm, visualized portions of the abdomen demonstrate   patient to be status post cholecystectomy.    Degenerative changes of the spine are noted.    IMPRESSION: Small bilateral pleural effusions and small pericardial   effusion.    --- End of Report ---    < end of copied text >      TTE:  < from: TTE W or WO Ultrasound Enhancing Agent (02.10.25 @ 12:09) >    TRANSTHORACIC ECHOCARDIOGRAM REPORT  ________________________________________________________________________________                                      _______       Pt. Name:       TOMASZ ALBA Study Date:    2/10/2025  MRN:            [id number]      YOB: 1960  Accession #:    [id number]       Age:           64 years  Account#:       [id number]    Gender:        F  Heart Rate:                     Height:        63.00 in (160.02 cm)  Rhythm:                         Weight:       147.00 lb (66.68 kg)  Blood Pressure: 112/56 mmHg     BSA/BMI:       1.70 m² / 26.04 kg/m²  ________________________________________________________________________________________  Referring Physician:       [id number] Mario Lu  Interpreting Physician:    Reg Glaser MD  Primary Sonographer:       Kassie Bergeron [facility]  Fellow (Performing):       Abbe Pickard MD  Fellow (Interpreting):     Abbe Pickard MD  Fellow (2nd Interpreting): Josefina Yo MD    CPT:                ECHO TTE [facility] - .m;DEFINITY ECHO CONTRAST PER                      ML WASTED - .m;DEFINITY ECHO CONTRAST PER ML - .m  Indication(s):      Abnormal electrocardiogram ECG EKG - R94.31  Procedure:          Limited transthoracic echocardiogram.  Ordering Location:  [facility]  Admission Status:   Inpatient  Contrast Injection: Verbal consent was obtained for injection of Ultrasonic                      Enhancing Agent following a discussion of risks and                      benefits.   Endocardial visualization enhanced with 2 ml of Definity                      Ultrasound enhancing agent (Lot#:[id number] Exp.Date:08/2025                      Discarded Dose:8ml).  UEA Reaction:       Patient had no adverse reaction after injection of                    Ultrasound Enhancing Agent.  Study Information:  Image quality for this study is fair.       CONCLUSIONS:      1. Left ventricular cavity is mildly dilated. Left ventricular systolic function is severely decreased with an ejection fraction of 20 % by Winchester's method of disks. Regional wall motion abnormalities present.   2. Multiple segmental abnormalities exist. See findings.   3. Reduced right ventricular systolic function.   4. No pericardial effusion seen.   5. Compared to the transthoracic echocardiogram performed on 1/16/2025, Worsening of the left ventricular function.    ________________________________________________________________________________________  FINDINGS:     Left Ventricle:  The left ventricular cavity is mildly dilated. Left ventricular systolic function is severely decreased with a calculated ejection fraction of 20 % by the Winchester's biplane method of disks. There are regional wall motion abnormalities present.  LV Wall Scoring: The entire septum, entire apex, entire inferior wall, mid  inferolateral segment, and mid anterolateral segment are hypokinetic. All  remaining scored segments are normal.          Right Ventricle:  The right ventricular cavity is normal in size and right ventricular systolic function is reduced. Tricuspid annular plane systolic excursion (TAPSE) is 1.2 cm (normal >=1.7 cm).     Pericardium:  No pericardial effusion seen.  ____________________________________________________________________  QUANTITATIVE DATA:  Left Ventricle Measurements: (Indexed to BSA)     BiPlane LV EF%: 20 %             Right Ventricle Measurements:     TAPSE: 1.2 cm       LVOT / RVOT/ Qp/Qs Data: (Indexed to BSA)  LVOT Vmax:      0.70 m/s  LVOT Vmn:       0.455 m/s  LVOT VTI:       11.00 cm  LVOT peak grad: 2 mmHg  LVOT mean grad: 1.0 mmHg    < end of copied text >       PULMONARY CONSULT    HPI: 63 y/o F with PMH of CHF (last TTE 1/2025 with EF 30-35%), DM, diabetic foot ulcer, PAD, CAD. Recent admission at [facility] for CHF exacerbation, presented to [facility] as a transfer for worsening R leg pain. Hospital course c/b acute on chronic CHF - TTE with EF 20%, severely decreased LV and RV function, multiple regional motion abnormalities, s/p LHC revealing TVD, pleural/pericardial effusions, JAMEL, leukocytosis suspected to be in the setting of LE wound on IV ABX, persistent LE pain w/ RLE Arterial Duplex revealing popliteal artery occlusion  Plan for eventual angiogram with vascular when medically optimized. Pulmonary called to consult as pt with c/o SOB. Never smoker. Denies hx of lung disease, inhaler use. SOB primarily with exertion and when laying flat, denies SOB at rest. Also endorses that SOB improving since hospital admission. Denies CP, fever, chills. O2 sats 98% on room air.       PAST MEDICAL & SURGICAL HISTORY:  DM (diabetes mellitus)  Diabetic foot ulcer  Congestive heart failure (CHF)  CAD (coronary artery disease)  Cataract of both eyes, unspecified cataract type  History of cholecystectomy        Allergies    No Known Allergies    Intolerances    Augmentin (Stomach Upset; Vomiting; Nausea)    FAMILY HISTORY:  Family history of CHF (congestive heart failure) (Mother)    Social history: never a smoker     Review of Systems:  CONSTITUTIONAL: No fever, chills, or fatigue  EYES: No eye pain, visual disturbances, or discharge  ENMT:  No difficulty hearing, tinnitus, vertigo; No sinus or throat pain  NECK: No pain or stiffness  RESPIRATORY: Per above  CARDIOVASCULAR: per above  GASTROINTESTINAL: No abdominal or epigastric pain. No nausea, vomiting, or hematemesis; No diarrhea or constipation. No melena or hematochezia.  GENITOURINARY: No dysuria, frequency, hematuria, or incontinence  NEUROLOGICAL: No headaches, memory loss, loss of strength, numbness, or tremors  SKIN: No itching, burning, rashes, or lesions   MUSCULOSKELETAL: No joint pain or swelling; No muscle, back, or extremity pain  PSYCHIATRIC: No depression, anxiety, mood swings, or difficulty sleeping    Medications:  MEDICATIONS  (STANDING):  ampicillin/sulbactam  IVPB 3 Gram(s) IV Intermittent every 12 hours  ampicillin/sulbactam  IVPB      aspirin enteric coated 81 milliGRAM(s) Oral daily  atorvastatin 40 milliGRAM(s) Oral at bedtime  benzocaine/menthol Lozenge 1 Lozenge Oral once  bisacodyl 5 milliGRAM(s) Oral every 12 hours  buMETAnide Infusion 3 mG/Hr (15 mL/Hr) IV Continuous <Continuous>  dextrose 5%. 1000 milliLiter(s) (50 mL/Hr) IV Continuous <Continuous>  dextrose 5%. 1000 milliLiter(s) (100 mL/Hr) IV Continuous <Continuous>  dextrose 50% Injectable 25 Gram(s) IV Push once  dextrose 50% Injectable 25 Gram(s) IV Push once  dextrose 50% Injectable 12.5 Gram(s) IV Push once  glucagon  Injectable 1 milliGRAM(s) IntraMuscular once  heparin   Injectable 5000 Unit(s) SubCutaneous every 8 hours  insulin glargine Injectable (LANTUS) 10 Unit(s) SubCutaneous at bedtime  insulin lispro (ADMELOG) corrective regimen sliding scale   SubCutaneous three times a day before meals  insulin lispro (ADMELOG) corrective regimen sliding scale   SubCutaneous at bedtime  iron sucrose IVPB 200 milliGRAM(s) IV Intermittent every 24 hours  linagliptin 5 milliGRAM(s) Oral daily  metolazone 10 milliGRAM(s) Oral daily  milrinone Infusion 0.25 MICROgram(s)/kG/Min (5.41 mL/Hr) IV Continuous <Continuous>  pantoprazole  Injectable 40 milliGRAM(s) IV Push daily  polyethylene glycol 3350 17 Gram(s) Oral daily  senna 2 Tablet(s) Oral at bedtime  sodium chloride 3% Bolus 150 milliLiter(s) IV Bolus <User Schedule>  spironolactone 50 milliGRAM(s) Oral daily    MEDICATIONS  (PRN):  acetaminophen     Tablet .. 650 milliGRAM(s) Oral every 6 hours PRN Mild Pain (1 - 3)  dextrose Oral Gel 15 Gram(s) Oral once PRN Blood Glucose LESS THAN 70 milliGRAM(s)/deciliter  melatonin 3 milliGRAM(s) Oral at bedtime PRN Insomnia  ondansetron Injectable 4 milliGRAM(s) IV Push every 6 hours PRN Nausea and/or Vomiting  oxyCODONE    IR 10 milliGRAM(s) Oral every 4 hours PRN Severe Pain (7 - 10)            Vital Signs Last 24 Hrs  T(C): 36.8 (15 Feb 2025 05:04), Max: 37.3 (14 Feb 2025 17:08)  T(F): 98.2 (15 Feb 2025 05:04), Max: 99.1 (14 Feb 2025 17:08)  HR: 105 (15 Feb 2025 05:04) (95 - 105)  BP: 105/65 (15 Feb 2025 05:04) (103/67 - 112/69)  BP(mean): --  RR: 18 (15 Feb 2025 05:04) (18 - 18)  SpO2: 95% (15 Feb 2025 05:04) (95% - 98%)    Parameters below as of 15 Feb 2025 05:04  Patient On (Oxygen Delivery Method): room air          VBG pH 7.36 02-14 @ 16:00  VBG pCO2 59 02-14 @ 16:00  VBG O2 sat 35.8 02-14 @ 16:00  VBG lactate 1.4 02-14 @ 16:00        02-14 @ 07:01  -  02-15 @ 07:00  --------------------------------------------------------  IN: 1299.2 mL / OUT: 1675 mL / NET: -375.8 mL          LABS:                        8.5    16.56 )-----------( 385      ( 15 Feb 2025 07:22 )             25.3     02-14    139  |  92[L]  |  55[H]  ----------------------------<  137[H]  3.4[L]   |  29  |  2.40[H]    Ca    9.7      14 Feb 2025 16:09  Phos  4.1     02-14  Mg     2.1     02-14            CAPILLARY BLOOD GLUCOSE      POCT Blood Glucose.: 128 mg/dL (15 Feb 2025 08:23)      Urinalysis Basic - ( 14 Feb 2025 16:09 )    Color: x / Appearance: x / SG: x / pH: x  Gluc: 137 mg/dL / Ketone: x  / Bili: x / Urobili: x   Blood: x / Protein: x / Nitrite: x   Leuk Esterase: x / RBC: x / WBC x   Sq Epi: x / Non Sq Epi: x / Bacteria: x    Physical Examination:    General: No acute distress.      HEENT: Pupils equal, reactive to light.  Symmetric.    PULM: few basilar crackles b/l     CVS: S1, S2    ABD: Soft, nondistended, nontender, normoactive bowel sounds, no masses    EXT: + LE edema b/l     SKIN: b/l foot wounds     NEURO: Alert, oriented, interactive, nonfocal    RADIOLOGY REVIEWED  CXR: bibasilar atelectasis/effusions     CT chest:< from: CT Chest No Cont (01.25.25 @ 14:08) >  ACC: [id number] EXAM:  CT CHEST   ORDERED BY: TED MONROY     PROCEDURE DATE:  01/25/2025        INTERPRETATION:  Clinical information: Shortness of breath.    CT scan of the chest was obtained without administration of intravenous   contrast.    Several lymph nodes are present in the mediastinum.    Heart is enlarged in size. Calcification of the coronary arteries is   noted. Small pericardial effusion is noted.    No endobronchial lesions are noted. Patchy groundglass opacities noted   within both lungs are felt to be secondary to expiratory   technique/breathing artifact. Atelectasis is noted involving portions of   both lower lobes. This is secondary to small bilateral pleural effusions.    Below the diaphragm, visualized portions of the abdomen demonstrate   patient to be status post cholecystectomy.    Degenerative changes of the spine are noted.    IMPRESSION: Small bilateral pleural effusions and small pericardial   effusion.    --- End of Report ---    < end of copied text >      TTE:  < from: TTE W or WO Ultrasound Enhancing Agent (02.10.25 @ 12:09) >    TRANSTHORACIC ECHOCARDIOGRAM REPORT  ________________________________________________________________________________                                      _______       Pt. Name:       TOMASZ ALBA Study Date:    2/10/2025  MRN:            [id number]      YOB: 1960  Accession #:    [id number]       Age:           64 years  Account#:       [id number]    Gender:        F  Heart Rate:                     Height:        63.00 in (160.02 cm)  Rhythm:                         Weight:       147.00 lb (66.68 kg)  Blood Pressure: 112/56 mmHg     BSA/BMI:       1.70 m² / 26.04 kg/m²  ________________________________________________________________________________________  Referring Physician:       [id number] Mario Lu  Interpreting Physician:    Reg Glaser MD  Primary Sonographer:       Kassie Bergeron [facility]  Fellow (Performing):       Abbe Pickard MD  Fellow (Interpreting):     Abbe Pickard MD  Fellow (2nd Interpreting): Josefina Yo MD    CPT:                ECHO TTE W CON FU LTD - .m;DEFINITY ECHO CONTRAST PER                      ML WASTED - .m;DEFINITY ECHO CONTRAST PER ML - .m  Indication(s):      Abnormal electrocardiogram ECG EKG - R94.31  Procedure:          Limited transthoracic echocardiogram.  Ordering Location:  [facility]  Admission Status:   Inpatient  Contrast Injection: Verbal consent was obtained for injection of Ultrasonic                      Enhancing Agent following a discussion of risks and                      benefits.   Endocardial visualization enhanced with 2 ml of Definity                      Ultrasound enhancing agent (Lot#:[id number] Exp.Date:08/2025                      Discarded Dose:8ml).  UEA Reaction:       Patient had no adverse reaction after injection of                    Ultrasound Enhancing Agent.  Study Information:  Image quality for this study is fair.       CONCLUSIONS:      1. Left ventricular cavity is mildly dilated. Left ventricular systolic function is severely decreased with an ejection fraction of 20 % by Winchester's method of disks. Regional wall motion abnormalities present.   2. Multiple segmental abnormalities exist. See findings.   3. Reduced right ventricular systolic function.   4. No pericardial effusion seen.   5. Compared to the transthoracic echocardiogram performed on 1/16/2025, Worsening of the left ventricular function.    ________________________________________________________________________________________  FINDINGS:     Left Ventricle:  The left ventricular cavity is mildly dilated. Left ventricular systolic function is severely decreased with a calculated ejection fraction of 20 % by the Winchester's biplane method of disks. There are regional wall motion abnormalities present.  LV Wall Scoring: The entire septum, entire apex, entire inferior wall, mid  inferolateral segment, and mid anterolateral segment are hypokinetic. All  remaining scored segments are normal.          Right Ventricle:  The right ventricular cavity is normal in size and right ventricular systolic function is reduced. Tricuspid annular plane systolic excursion (TAPSE) is 1.2 cm (normal >=1.7 cm).     Pericardium:  No pericardial effusion seen.  ____________________________________________________________________  QUANTITATIVE DATA:  Left Ventricle Measurements: (Indexed to BSA)     BiPlane LV EF%: 20 %             Right Ventricle Measurements:     TAPSE: 1.2 cm       LVOT / RVOT/ Qp/Qs Data: (Indexed to BSA)  LVOT Vmax:      0.70 m/s  LVOT Vmn:       0.455 m/s  LVOT VTI:       11.00 cm  LVOT peak grad: 2 mmHg  LVOT mean grad: 1.0 mmHg    < end of copied text >

## 2025-02-15 NOTE — PROGRESS NOTE ADULT - ASSESSMENT
64F, hx of CHF (last TTE on 1/16/25 EF 30-35%, G2DD), DM, diabetic foot ulcer with a recent admission at [facility] for CHF exacerbation 1/14-1/17 p/w worsening R. leg pain and fluid secretion, was meeting sepsis criteria with podiatry having low suspicion for right cellulitis and admitted for continued management of HF exacerbation and needing ischemic eval.     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  -Left leg Arterial Duplex: Slightly tardus parvus waveform of the left popliteal artery is noted, for which underlying disease cannot be excluded. Posterior tibial artery waveform is nonpulsatile. Anterior tibial artery tardus parvus flow is noted.   Transferred to [facility] for CO2 angiogram as per vascular surgery    #  PAD Wound of lower extremity.   ·  Plan: Podiatry following, b/l serous bullae, no concerns for infection  Found to have RLE coolness  -Right Leg Arterial Duplex: Popliteal artery is occluded with negligible flow of right trifurcation arteries.  -Left leg Arterial Duplex: Slightly tardus parvus waveform of the left popliteal artery is noted, for which underlying disease cannot be excluded. Posterior tibial artery waveform is nonpulsatile. Anterior tibial artery tardus parvus flow is noted.  -Started on heparin gtt and dobutamine gtt -Will transfer to [facility] for CO2 angiogram as per vascular surgery as not candidate for CTA due to worsening SCr  -Keep compression dressing  -LE elevation above heart level at rest.  -Transferred to [facility] for CO2 angiogram   - Overall this patient is at   intermediate  risk (for cardiac death, nonfatal myocardial infarction, and nonfatal cardiac arrest perioperatively for this intermediate  risk procedure).   No cardiac contraindications for CO2 vangiogram  There  are  no further recommendation for risk stratifying imaging/stress testing prior to planned surgery  Seen by Podiatry-No acute pod intervention, local wound care only- Pod stable for discharge   PAD with rest ischemia plan for Angiogram Wednesday-Dr Louis        # HFrEF (congestive heart failure). Ischemic Cardiomyopathy  ·  Plan: Hx of HF, previous admission in January, on home Lasix 40 qD, Metoprolol Succ 25 qD. Not on Entresto due to insurance issues  Last TTE: LVSF moderately decreased w/ EF 30-35 %. Moderate G2DD. Mild MR  Stress test: small-sized, moderate defect(s) in the apical wall that is predominantly fixed suggestive of an infarction with minimal marcus-infarct ischemia  Ischemic cardiomyopathy  GDMT on hold 2/2 JAMEL    Repeat TTE EF 20% with WMA  Likely Ischemic cardiomyopathy despite NST revealing fixed defect Probably Multivessel CAD  She refused cardiac cath previously discussed with her and her brother they are now agreeable  LHC-RCA , severe ostial LM disease, diffuse disease in LAD and LCx, some L to R collaterals-seen by CTS advise cMR for viability poor target vessels for CABG-?High risk LM/LAD PCI    RHC: RA 20, PA 49/29 (40), PCWP 35, mVO2 51.1, CO/CI 3.8/2.2, SVR 1095  Continue Bumex 3mg/hr   Started on Milrinone to assist augmented diuresis in attempt to avoid further renal failure    LE EDEMA no DVT      # JAMEL  Creatinine Trend:2.40<-- 2.37<--2.50<--2.49<--2.42<--, 2.29<--,2.07<--2.16<-- 2.27<--2.56<--, 2.82<--, 2.98<--, 3.31<--, 2.65<--, 3.90<-- 1.17<--  Cardiorenal

## 2025-02-15 NOTE — PROGRESS NOTE ADULT - SUBJECTIVE AND OBJECTIVE BOX
Name of Patient : TOMASZ ALBA  MRN: [id number]      Subjective: Patient seen and examined. No new events except as noted.   doing okay  LE pain       REVIEW OF SYSTEMS:    CONSTITUTIONAL: No weakness, fevers or chills  EYES/ENT: No visual changes;  No vertigo or throat pain   NECK: No pain or stiffness  RESPIRATORY: No cough, wheezing, hemoptysis; No shortness of breath  CARDIOVASCULAR: No chest pain or palpitations  GASTROINTESTINAL: No abdominal or epigastric pain. No nausea, vomiting, or hematemesis; No diarrhea or constipation. No melena or hematochezia.  GENITOURINARY: No dysuria, frequency or hematuria  NEUROLOGICAL: No numbness or weakness  SKIN: No itching, burning, rashes, or lesions   All other review of systems is negative unless indicated above.    MEDICATIONS:  MEDICATIONS  (STANDING):  ampicillin/sulbactam  IVPB 3 Gram(s) IV Intermittent every 12 hours  ampicillin/sulbactam  IVPB      aspirin enteric coated 81 milliGRAM(s) Oral daily  atorvastatin 40 milliGRAM(s) Oral at bedtime  benzocaine/menthol Lozenge 1 Lozenge Oral once  bisacodyl 5 milliGRAM(s) Oral every 12 hours  buMETAnide Infusion 3 mG/Hr (15 mL/Hr) IV Continuous <Continuous>  dextrose 5%. 1000 milliLiter(s) (50 mL/Hr) IV Continuous <Continuous>  dextrose 5%. 1000 milliLiter(s) (100 mL/Hr) IV Continuous <Continuous>  dextrose 50% Injectable 25 Gram(s) IV Push once  dextrose 50% Injectable 12.5 Gram(s) IV Push once  dextrose 50% Injectable 25 Gram(s) IV Push once  glucagon  Injectable 1 milliGRAM(s) IntraMuscular once  heparin   Injectable 5000 Unit(s) SubCutaneous every 8 hours  insulin glargine Injectable (LANTUS) 10 Unit(s) SubCutaneous at bedtime  insulin lispro (ADMELOG) corrective regimen sliding scale   SubCutaneous three times a day before meals  insulin lispro (ADMELOG) corrective regimen sliding scale   SubCutaneous at bedtime  iron sucrose IVPB 200 milliGRAM(s) IV Intermittent every 24 hours  linagliptin 5 milliGRAM(s) Oral daily  metolazone 10 milliGRAM(s) Oral daily  milrinone Infusion 0.25 MICROgram(s)/kG/Min (5.41 mL/Hr) IV Continuous <Continuous>  pantoprazole  Injectable 40 milliGRAM(s) IV Push daily  polyethylene glycol 3350 17 Gram(s) Oral daily  senna 2 Tablet(s) Oral at bedtime  sodium chloride 3% Bolus 150 milliLiter(s) IV Bolus <User Schedule>  spironolactone 50 milliGRAM(s) Oral daily      PHYSICAL EXAM:  T(C): 36.7 (02-15-25 @ 21:20), Max: 36.9 (02-15-25 @ 01:25)  HR: 101 (02-15-25 @ 21:20) (96 - 105)  BP: 101/54 (02-15-25 @ 21:20) (96/53 - 110/69)  RR: 18 (02-15-25 @ 21:20) (18 - 18)  SpO2: 96% (02-15-25 @ 21:20) (93% - 97%)  Wt(kg): --  I&O's Summary    14 Feb 2025 07:01  -  15 Feb 2025 07:00  --------------------------------------------------------  IN: 1299.2 mL / OUT: 1675 mL / NET: -375.8 mL    15 Feb 2025 07:01  -  16 Feb 2025 00:21  --------------------------------------------------------  IN: 1641.4 mL / OUT: 1900 mL / NET: -258.6 mL          Appearance: Normal	  HEENT:  PERRLA   Lymphatic: No lymphadenopathy   Cardiovascular: Normal S1 S2, no JVD  Respiratory: normal effort , clear  Gastrointestinal:  Soft, Non-tender  Skin: No rashes,  warm to touch  Psychiatry:  Mood & affect appropriate  Musculuskeletal: No edema    recent labs, Imaging and EKGs personally reviewed     02-14-25 @ 07:01  -  02-15-25 @ 07:00  --------------------------------------------------------  IN: 1299.2 mL / OUT: 1675 mL / NET: -375.8 mL    02-15-25 @ 07:01  -  02-16-25 @ 00:21  --------------------------------------------------------  IN: 1641.4 mL / OUT: 1900 mL / NET: -258.6 mL                          8.5    16.56 )-----------( 385      ( 15 Feb 2025 07:22 )             25.3               02-15    138  |  94[L]  |  62[H]  ----------------------------<  150[H]  3.6   |  27  |  2.80[H]    Ca    9.7      15 Feb 2025 15:52  Phos  4.1     02-14  Mg     2.1     02-14                         Urinalysis Basic - ( 15 Feb 2025 15:52 )    Color: x / Appearance: x / SG: x / pH: x  Gluc: 150 mg/dL / Ketone: x  / Bili: x / Urobili: x   Blood: x / Protein: x / Nitrite: x   Leuk Esterase: x / RBC: x / WBC x   Sq Epi: x / Non Sq Epi: x / Bacteria: x

## 2025-02-15 NOTE — PROGRESS NOTE ADULT - ASSESSMENT
64y Female with history of CHF presents as a transfer for LE CO2 angiogram. Nephrology consulted for elevated Scr.    1) JAMEL: likely due to ATN in addition to CRS. Scr uptrending on 2/15, Unclear if this is due to an JAMEL or due to a decrease in volume overload. Monitor CMP daily. UA relatively bland. FeUrea low consistent with low flow state. Renal US unremarkable. Bladder scan on 2/3 negative. Avoid nephrotoxins.    2) HTN: BP low normal. Monitor off anti-hypertensive medications.    3) LE edema: Not secondary to nephrotic syndrome given subnephrotic range proteinuria. Continue with bumex gtt @ 3 mg/hour with metolazone, aldactone and HTS boluses. Plan to discontinue milrinone gtt given normal cardiac output during cardiac cath. TTE with severely decreased LVSF. Monitor UO.    4) Hyperphosphatemia: Resolved. Continue with low phosphorus diet.     [facility] NEPHROLOGY  Raji Waterman M.D.  Mars Feliz D.O.  Kelley Parks M.D.  MD Nancy Kulkarni, MSN, ANP-C    Telephone: [phone]  Facsimile: [phone] [address], [address]  [place] [address]

## 2025-02-16 DIAGNOSIS — I50.22 CHRONIC SYSTOLIC (CONGESTIVE) HEART FAILURE: ICD-10-CM

## 2025-02-16 DIAGNOSIS — I73.9 PERIPHERAL VASCULAR DISEASE, UNSPECIFIED: ICD-10-CM

## 2025-02-16 LAB
ANION GAP SERPL CALC-SCNC: 19 MMOL/L — HIGH (ref 5–17)
ANION GAP SERPL CALC-SCNC: 24 MMOL/L — HIGH (ref 5–17)
BUN SERPL-MCNC: 65 MG/DL — HIGH (ref 7–23)
BUN SERPL-MCNC: 66 MG/DL — HIGH (ref 7–23)
CALCIUM SERPL-MCNC: 9.5 MG/DL — SIGNIFICANT CHANGE UP (ref 8.4–10.5)
CALCIUM SERPL-MCNC: 9.6 MG/DL — SIGNIFICANT CHANGE UP (ref 8.4–10.5)
CHLORIDE SERPL-SCNC: 91 MMOL/L — LOW (ref 96–108)
CHLORIDE SERPL-SCNC: 94 MMOL/L — LOW (ref 96–108)
CO2 SERPL-SCNC: 24 MMOL/L — SIGNIFICANT CHANGE UP (ref 22–31)
CO2 SERPL-SCNC: 29 MMOL/L — SIGNIFICANT CHANGE UP (ref 22–31)
CREAT SERPL-MCNC: 2.81 MG/DL — HIGH (ref 0.5–1.3)
CREAT SERPL-MCNC: 2.95 MG/DL — HIGH (ref 0.5–1.3)
EGFR: 17 ML/MIN/1.73M2 — LOW
EGFR: 17 ML/MIN/1.73M2 — LOW
EGFR: 18 ML/MIN/1.73M2 — LOW
EGFR: 18 ML/MIN/1.73M2 — LOW
GLUCOSE BLDC GLUCOMTR-MCNC: 144 MG/DL — HIGH (ref 70–99)
GLUCOSE BLDC GLUCOMTR-MCNC: 157 MG/DL — HIGH (ref 70–99)
GLUCOSE BLDC GLUCOMTR-MCNC: 162 MG/DL — HIGH (ref 70–99)
GLUCOSE BLDC GLUCOMTR-MCNC: 164 MG/DL — HIGH (ref 70–99)
GLUCOSE BLDC GLUCOMTR-MCNC: 169 MG/DL — HIGH (ref 70–99)
GLUCOSE SERPL-MCNC: 124 MG/DL — HIGH (ref 70–99)
GLUCOSE SERPL-MCNC: 153 MG/DL — HIGH (ref 70–99)
MAGNESIUM SERPL-MCNC: 2 MG/DL — SIGNIFICANT CHANGE UP (ref 1.6–2.6)
POTASSIUM SERPL-MCNC: 3.4 MMOL/L — LOW (ref 3.5–5.3)
POTASSIUM SERPL-MCNC: 3.7 MMOL/L — SIGNIFICANT CHANGE UP (ref 3.5–5.3)
POTASSIUM SERPL-SCNC: 3.4 MMOL/L — LOW (ref 3.5–5.3)
POTASSIUM SERPL-SCNC: 3.7 MMOL/L — SIGNIFICANT CHANGE UP (ref 3.5–5.3)
SODIUM SERPL-SCNC: 139 MMOL/L — SIGNIFICANT CHANGE UP (ref 135–145)
SODIUM SERPL-SCNC: 142 MMOL/L — SIGNIFICANT CHANGE UP (ref 135–145)

## 2025-02-16 PROCEDURE — 99233 SBSQ HOSP IP/OBS HIGH 50: CPT

## 2025-02-16 RX ORDER — SODIUM CHLORIDE 0.65 %
1 AEROSOL, SPRAY (ML) NASAL THREE TIMES A DAY
Refills: 0 | Status: DISCONTINUED | OUTPATIENT
Start: 2025-02-16 | End: 2025-03-17

## 2025-02-16 RX ADMIN — MILRINONE LACTATE 5.41 MICROGRAM(S)/KG/MIN: 1 INJECTION, SOLUTION INTRAVENOUS at 22:04

## 2025-02-16 RX ADMIN — IRON SUCROSE 110 MILLIGRAM(S): 20 INJECTION, SOLUTION INTRAVENOUS at 12:53

## 2025-02-16 RX ADMIN — ATORVASTATIN CALCIUM 40 MILLIGRAM(S): 80 TABLET, FILM COATED ORAL at 21:36

## 2025-02-16 RX ADMIN — Medication 1 SPRAY(S): at 21:48

## 2025-02-16 RX ADMIN — INSULIN LISPRO 1: 100 INJECTION, SOLUTION INTRAVENOUS; SUBCUTANEOUS at 14:38

## 2025-02-16 RX ADMIN — INSULIN LISPRO 1: 100 INJECTION, SOLUTION INTRAVENOUS; SUBCUTANEOUS at 18:48

## 2025-02-16 RX ADMIN — SODIUM CHLORIDE 300 MILLILITER(S): 3 INJECTION, SOLUTION INTRAVENOUS at 19:54

## 2025-02-16 RX ADMIN — LINAGLIPTIN 5 MILLIGRAM(S): 5 TABLET, FILM COATED ORAL at 12:54

## 2025-02-16 RX ADMIN — OXYCODONE HYDROCHLORIDE 10 MILLIGRAM(S): 30 TABLET ORAL at 22:15

## 2025-02-16 RX ADMIN — Medication 81 MILLIGRAM(S): at 12:54

## 2025-02-16 RX ADMIN — OXYCODONE HYDROCHLORIDE 10 MILLIGRAM(S): 30 TABLET ORAL at 05:42

## 2025-02-16 RX ADMIN — OXYCODONE HYDROCHLORIDE 10 MILLIGRAM(S): 30 TABLET ORAL at 21:35

## 2025-02-16 RX ADMIN — Medication 2 TABLET(S): at 21:35

## 2025-02-16 RX ADMIN — HEPARIN SODIUM 5000 UNIT(S): 1000 INJECTION INTRAVENOUS; SUBCUTANEOUS at 05:40

## 2025-02-16 RX ADMIN — HEPARIN SODIUM 5000 UNIT(S): 1000 INJECTION INTRAVENOUS; SUBCUTANEOUS at 13:03

## 2025-02-16 RX ADMIN — Medication 5 MILLIGRAM(S): at 05:41

## 2025-02-16 RX ADMIN — Medication 3 MILLIGRAM(S): at 21:36

## 2025-02-16 RX ADMIN — Medication 40 MILLIGRAM(S): at 12:54

## 2025-02-16 RX ADMIN — INSULIN GLARGINE-YFGN 10 UNIT(S): 100 INJECTION, SOLUTION SUBCUTANEOUS at 21:36

## 2025-02-16 RX ADMIN — Medication 40 MILLIEQUIVALENT(S): at 09:47

## 2025-02-16 RX ADMIN — SODIUM CHLORIDE 300 MILLILITER(S): 3 INJECTION, SOLUTION INTRAVENOUS at 12:53

## 2025-02-16 RX ADMIN — OXYCODONE HYDROCHLORIDE 10 MILLIGRAM(S): 30 TABLET ORAL at 06:12

## 2025-02-16 RX ADMIN — AMPICILLIN SODIUM AND SULBACTAM SODIUM 200 GRAM(S): 1; .5 INJECTION, POWDER, FOR SOLUTION INTRAMUSCULAR; INTRAVENOUS at 21:39

## 2025-02-16 RX ADMIN — AMPICILLIN SODIUM AND SULBACTAM SODIUM 200 GRAM(S): 1; .5 INJECTION, POWDER, FOR SOLUTION INTRAMUSCULAR; INTRAVENOUS at 09:47

## 2025-02-16 RX ADMIN — BUMETANIDE 15 MG/HR: 1 TABLET ORAL at 22:04

## 2025-02-16 RX ADMIN — SODIUM CHLORIDE 300 MILLILITER(S): 3 INJECTION, SOLUTION INTRAVENOUS at 05:42

## 2025-02-16 RX ADMIN — Medication 50 MILLIGRAM(S): at 05:41

## 2025-02-16 RX ADMIN — HEPARIN SODIUM 5000 UNIT(S): 1000 INJECTION INTRAVENOUS; SUBCUTANEOUS at 21:36

## 2025-02-16 NOTE — PROGRESS NOTE ADULT - SUBJECTIVE AND OBJECTIVE BOX
MR#[id number]  PATIENT NAME:COLE ALBA    DATE OF SERVICE: 02-16-25 @ 08:21  Patient was seen and examined by Yordy Gilmore MD on    02-16-25 @ 08:21 .  Interim events noted.Consultant notes ,Labs,Telemetry reviewed by me       HOSPITAL COURSE:64F, hx of CHF (last TTE on 1/16/25 EF 30-35%, G2DD), DM, diabetic foot ulcer with a recent admission at [facility] for CHF exacerbation presented as a transfer for worsening R. leg pain and fluid secretion, On non-invasive imaging demonstrating severe depletion of RLE blood flow beyond the popliteal vessel at knee and similar findings in L. Was transferred to [facility] for CO2 angiogram with Dr. Baltazar. (29 Jan 2025 20:05)    Transferred from [facility]-TTE on previous admission: LVSF moderately decreased w/EF 30-35%. Moderate G2DD. Mild MR. Ischemic evaluation was recommended for which patient undergone stress test which revealed a small-sized moderate defect in the apical wall that is predominantly fixed suggestive of an infarction with minimal marcus-infarct ischemia. 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 Left leg Arterial Duplex: Slightly tardus parvus waveform of the left popliteal artery is noted, for which underlying disease cannot be excluded. Posterior tibial artery waveform is nonpulsatile. Anterior tibial artery tardus parvus flow is noted. Patient started on Heparin and Dobutamine drip with transfer to [facility] for further management.        INTERIM EVENTS:Patient seen at bedside ,interim events noted.  Awake alert remains on Bumex gtt and  gtt at 2.5mcg CO2 Angiogram postponed to next week-she is still orthopneac  02/08-on  5mcg and Bumex gtt   02/10-OOB still orthopneac [id number]/o LE weakness and swelling  02/11-c/o LE pain not dyspneac  held on Bumex 3 mg/Hr  02/12-Awake seen by HF   02/13-Remains on Bumex 3mg/Hr for possible LHC/RHC  02/14-Had cath Multivessel CAD started on Milrinone for CTS evaluation albeit targets not optimal  02/15-Awake on Milrinone and Bumex gtt-seen by CTS not a surgical candidate-needs Vascular work-up for LE PAD-scheduled for Angiogram on Wednesday    Weight 170Kg---> 164 Kg  02/16-Awake is able to lie flat c/o RLE pain Sinus Rhythm UOP-2750mL,on Milrinone 0.25mcg Bumex 3mg/Hr,Metolazone 10mg    PMH -reviewed admission note, no change since admission  HEART FAILURE: Acute[x ]Chronic[x ] Systolic[x ] Diastolic[ ] Combined Systolic and Diastolic[ ]  CAD[ ] CABG[ ] PCI[ ]  DEVICES[ ] PPM[ ] ICD[ ] ILR[ ]  ATRIAL FIBRILLATION[ ] Paroxysmal[ ] Permanent[ ] CHADS2-[  ]  JAMEL[ ] CKD1[ ] CKD2[ ] CKD3[ ] CKD4[ ] ESRD[ ]  COPD[ ] HTN[ ]   DM[ ] Type1[ ] Type 2[ ]   CVA[ ] Paresis[ ]    AMBULATION: Assisted[ ] Cane/walker[ ] Independent[ x]               MEDICATIONS  (STANDING):  ampicillin/sulbactam  IVPB 3 Gram(s) IV Intermittent every 12 hours  ampicillin/sulbactam  IVPB      aspirin enteric coated 81 milliGRAM(s) Oral daily  atorvastatin 40 milliGRAM(s) Oral at bedtime  benzocaine/menthol Lozenge 1 Lozenge Oral once  bisacodyl 5 milliGRAM(s) Oral every 12 hours  buMETAnide Infusion 3 mG/Hr (15 mL/Hr) IV Continuous <Continuous>  heparin   Injectable 5000 Unit(s) SubCutaneous every 8 hours  insulin glargine Injectable (LANTUS) 10 Unit(s) SubCutaneous at bedtime  insulin lispro (ADMELOG) corrective regimen sliding scale   SubCutaneous three times a day before meals  insulin lispro (ADMELOG) corrective regimen sliding scale   SubCutaneous at bedtime  iron sucrose IVPB 200 milliGRAM(s) IV Intermittent every 24 hours  linagliptin 5 milliGRAM(s) Oral daily  metolazone 10 milliGRAM(s) Oral daily  milrinone Infusion 0.25 MICROgram(s)/kG/Min (5.41 mL/Hr) IV Continuous <Continuous>  pantoprazole  Injectable 40 milliGRAM(s) IV Push daily  polyethylene glycol 3350 17 Gram(s) Oral daily  senna 2 Tablet(s) Oral at bedtime  sodium chloride 3% Bolus 150 milliLiter(s) IV Bolus <User Schedule>  spironolactone 50 milliGRAM(s) Oral daily    MEDICATIONS  (PRN):  acetaminophen     Tablet .. 650 milliGRAM(s) Oral every 6 hours PRN Mild Pain (1 - 3)  dextrose Oral Gel 15 Gram(s) Oral once PRN Blood Glucose LESS THAN 70 milliGRAM(s)/deciliter  melatonin 3 milliGRAM(s) Oral at bedtime PRN Insomnia  ondansetron Injectable 4 milliGRAM(s) IV Push every 6 hours PRN Nausea and/or Vomiting  oxyCODONE    IR 10 milliGRAM(s) Oral every 4 hours PRN Severe Pain (7 - 10)            REVIEW OF SYSTEMS:  Constitutional: [ ] fever, [ ]weight loss,  [ x]fatigue [ ]weight gain  Eyes: [ ] visual changes  Respiratory: [ ]shortness of breath;  [ ] cough, [ ]wheezing, [ ]chills, [ ]hemoptysis  Cardiovascular: [ ] chest pain, [ ]palpitations, [ ]dizziness,  [x ]leg swelling[ ]orthopnea[ ]PND  Gastrointestinal: [ ] abdominal pain, [ ]nausea, [ ]vomiting,  [ ]diarrhea [ ]Constipation [ ]Melena  Genitourinary: [ ] dysuria, [ ] hematuria [ ][place]  Neurologic: [ ] headaches [ ] tremors[ ]weakness [ ]Paralysis Right[ ] Left[ ]  Skin: [ ] itching, [ ]burning, [ ] rashes  Endocrine: [ ] heat or cold intolerance  Musculoskeletal: [ ] joint pain or swelling; [x ] muscle, back, or extremity pain  Psychiatric: [ ] depression, [ ]anxiety, [ ]mood swings, or [ ]difficulty sleeping  Hematologic: [ ] easy bruising, [ ] bleeding gums    [ ] All remaining systems negative except as per above.   [ ]Unable to obtain.  [x] No change in ROS since admission      Vital Signs Last 24 Hrs  T(C): 36.3 (16 Feb 2025 05:53), Max: 36.9 (15 Feb 2025 09:14)  T(F): 97.4 (16 Feb 2025 05:53), Max: 98.5 (15 Feb 2025 09:14)  HR: 99 (16 Feb 2025 05:53) (96 - 105)  BP: 105/65 (16 Feb 2025 05:53) (96/53 - 105/65)  RR: 18 (16 Feb 2025 05:53) (18 - 18)  SpO2: 93% (16 Feb 2025 05:53) (93% - 96%)    Parameters below as of 16 Feb 2025 05:53  Patient On (Oxygen Delivery Method): room air      I&O's Summary    15 Feb 2025 07:01  -  16 Feb 2025 07:00  --------------------------------------------------------  IN: 2333.8 mL / OUT: 2750 mL / NET: -416.2 mL        PHYSICAL EXAM:  General: No acute distress BMI-25  HEENT: EOMI, PERRL  Neck: Supple, [ ] JVD  Lungs: Equal air entry bilaterally; [ ] rales [ ] wheezing [ ] rhonchi  Heart: Regular rate and rhythm; [x ] murmur   2/6 [ x] systolic [ ] diastolic [ ] radiation[ ] rubs [ ]  gallops  Abdomen: Nontender, bowel sounds present  Extremities: No clubbing, cyanosis, [x ] edema [ ]  Nervous system:  Alert & Oriented X3, no focal deficits  Psychiatric: Normal affect  Skin: No rashes or lesions    LABS:  02-16    139  |  91[L]  |  66[H]  ----------------------------<  124[H]  3.4[L]   |  29  |  2.81[H]    Ca    9.5      16 Feb 2025 06:35  Mg     2.0     02-16      Creatinine Trend: 2.81<--, 2.80<--, 2.74<--, 2.40<--, 2.39<--, 2.37<--                        8.5    16.56 )-----------( 385      ( 15 Feb 2025 07:22 )             25.3                TTE W or WO Ultrasound Enhancing Agent (02.10.25 @ 12:09) >  CONCLUSIONS:      1. Left ventricular cavity is mildly dilated. Left ventricular systolic function is severely decreased with an ejection fraction of 20 % by Winchester's method of disks. Regional wall motion abnormalities present.   2. Multiple segmental abnormalities exist. See findings.   The entire septum, entire apex, entire inferior wall, mid inferolateral segment, and mid anterolateral segment are hypokinetic. All remaining scored segments are normal.     3. Reduced right ventricular systolic function.   4. No pericardial effusion seen.   5. Compared to the transthoracic echocardiogram performed on 1/16/2025, Worsening of the left ventricular function.        VA Duplex Lower Ext Vein Scan, Bilat (02.10.25 @ 17:42) >  IMPRESSION: No evidence of bilateral DVT within the imaged veins.        Nuclear Stress Test-Pharmacologic.. (01.24.25 @ 10:31) >  Conclusions:   1. Myocardial Perfusion: Abnormal.   2. Qualitative Perfusion:      - small-sized, moderate defect(s) in the apical wall that is predominantly fixed suggestive of an infarction with minimal marcus-infarct ischemia.   3. The post stress left ventricular EF is 25 %. The stress end diastolic volume is 118 ml.   4. Results D/Wcardiologist Dr. Gilmore at 18:31        RHC: RA 20, PA 49/29 (40), PCWP 35, mVO2 51.1, CO/CI 3.8/2.2, SVR 1095  LHC: RCA , severe ostial LM disease, diffuse disease in LAD and LCx, some L to R collaterals

## 2025-02-16 NOTE — PROGRESS NOTE ADULT - PROBLEM SELECTOR PLAN 1
- Milrinone 0.25mcg/kg/min  - Continue bumex gtt 3/hr  - augmentation with hypertonic saline 3% 150cc IV TID and metolazone 10mg daily  -iron sucrose 200mg IV q5 days  -replete K>4, Mg>2  -increase meron to 50mg  -please get a standing weight, goal IO -2L

## 2025-02-16 NOTE — PROGRESS NOTE ADULT - ASSESSMENT
64F, hx of HFrEF (previously 30-35%, now 20%), never had LHC, DM, diabetic foot ulcer, severe PAD b/l, presenting initially for vascular angiogram, found to be in ADHF with volume overload. No lactate or other signs of poor perfusion other than JAMEL which initially improved.     RHC: RA 20, PA 49/29 (40), PCWP 35, mVO2 51.1, CO/CI 3.8/2.2, SVR 1095  LHC: RCA , severe ostial LM disease, diffuse disease in LAD and LCx, some L to R collaterals    Cardiac testing:  TTE 2/10/25: EF 20%, reduced RVSF  TTE 1/16/25: EF 30-35%, grade 2 DD  NST 1/24/25: small moderate defect in apical wall that is predominantly fixed    Home cardiac meds: toprol 25    #HFrEF, likely ischemic  -mirinone gtt started  -c/w bumex gtt 3/hr  -add augmentation with hypertonic saline 3% 150cc IV TID and metolazone 10mg daily  -iron sucrose 200mg IV q5 days  -replete K>4, Mg>2  -increase meron to 50mg  -please get a standing weight, goal IO -2L    #CAD  -c/w ASA  -consult CT surgery for CABG evaluation vs high risk PCI vs medical management    #PAD  -vascular deferring CO2 angiogram to outpatient   64F, hx of HFrEF (previously 30-35%, now 20%), never had LHC, DM, diabetic foot ulcer, severe PAD b/l, presenting initially for vascular angiogram, found to be in ADHF with volume overload. No lactate or other signs of poor perfusion other than JAMEL which initially improved.     RHC: RA 20, PA 49/29 (40), PCWP 35, mVO2 51.1, CO/CI 3.8/2.2, SVR 1095  LHC: RCA , severe ostial LM disease, diffuse disease in LAD and LCx, some L to R collaterals    Cardiac testing:  TTE 2/10/25: EF 20%, reduced RVSF  TTE 1/16/25: EF 30-35%, grade 2 DD  NST 1/24/25: small moderate defect in apical wall that is predominantly fixed    Home cardiac meds: toprol 25

## 2025-02-16 NOTE — PROGRESS NOTE ADULT - SUBJECTIVE AND OBJECTIVE BOX
Interval Events/Subjective    Patient seen and examined at bedside.   No chest pain, shortness of breath, or palpitations            Current Meds:  acetaminophen     Tablet .. 650 milliGRAM(s) Oral every 6 hours PRN  ampicillin/sulbactam  IVPB 3 Gram(s) IV Intermittent every 12 hours  ampicillin/sulbactam  IVPB      aspirin enteric coated 81 milliGRAM(s) Oral daily  atorvastatin 40 milliGRAM(s) Oral at bedtime  benzocaine/menthol Lozenge 1 Lozenge Oral once  bisacodyl 5 milliGRAM(s) Oral every 12 hours  buMETAnide Infusion 3 mG/Hr IV Continuous <Continuous>  dextrose 5%. 1000 milliLiter(s) IV Continuous <Continuous>  dextrose 5%. 1000 milliLiter(s) IV Continuous <Continuous>  dextrose 50% Injectable 25 Gram(s) IV Push once  dextrose 50% Injectable 12.5 Gram(s) IV Push once  dextrose 50% Injectable 25 Gram(s) IV Push once  dextrose Oral Gel 15 Gram(s) Oral once PRN  glucagon  Injectable 1 milliGRAM(s) IntraMuscular once  heparin   Injectable 5000 Unit(s) SubCutaneous every 8 hours  insulin glargine Injectable (LANTUS) 10 Unit(s) SubCutaneous at bedtime  insulin lispro (ADMELOG) corrective regimen sliding scale   SubCutaneous at bedtime  insulin lispro (ADMELOG) corrective regimen sliding scale   SubCutaneous three times a day before meals  iron sucrose IVPB 200 milliGRAM(s) IV Intermittent every 24 hours  linagliptin 5 milliGRAM(s) Oral daily  melatonin 3 milliGRAM(s) Oral at bedtime PRN  milrinone Infusion 0.25 MICROgram(s)/kG/Min IV Continuous <Continuous>  ondansetron Injectable 4 milliGRAM(s) IV Push every 6 hours PRN  oxyCODONE    IR 10 milliGRAM(s) Oral every 4 hours PRN  pantoprazole  Injectable 40 milliGRAM(s) IV Push daily  polyethylene glycol 3350 17 Gram(s) Oral daily  senna 2 Tablet(s) Oral at bedtime  sodium chloride 0.65% Nasal 1 Spray(s) Both Nostrils three times a day PRN  sodium chloride 3% Bolus 150 milliLiter(s) IV Bolus <User Schedule>  spironolactone 50 milliGRAM(s) Oral daily    Vitals:  T(F): 97.4 (02-16), Max: 98.3 (02-16)  HR: 99 (02-16) (96 - 102)  BP: 105/65 (02-16) (96/53 - 105/65)  RR: 18 (02-16)  SpO2: 93% (02-16)  I&O's Summary    15 Feb 2025 07:01  -  16 Feb 2025 07:00  --------------------------------------------------------  IN: 2333.8 mL / OUT: 2750 mL / NET: -416.2 mL    Physical Exam:  GENERAL: NAD  HEAD:  Atraumatic, Normocephalic.  NECK: Supple, No JVD.  CHEST/LUNG: Clear to auscultation bilaterally; No rales, rhonchi, wheezing, or rubs. Speaking in full sentences  HEART: Regular rate and rhythm; No murmurs, rubs, or gallops.  ABDOMEN: Bowel sounds present; Soft, Nontender, Nondistended.   EXTREMITIES:  Bilateral edema with both legs wrapped in ACE wraps, full evaluation limited due to patient discomfort                          8.5    16.56 )-----------( 385      ( 15 Feb 2025 07:22 )             25.3     02-16    139  |  91[L]  |  66[H]  ----------------------------<  124[H]  3.4[L]   |  29  |  2.81[H]    Ca    9.5      16 Feb 2025 06:35  Mg     2.0     02-16

## 2025-02-16 NOTE — PROGRESS NOTE ADULT - ASSESSMENT
63 YO F with PMHx of HFrEF 30-35 and grade 2 ddfxn (last TTE on 01/16/25), DM2, and diabetic foot ulcer. Recent admission at [facility] for ADHF. Patient now represents to OSH and ultimately to [facility] as a transfer for worsening right leg pain and fluid secretion. As per documentation, patient was reported to have severe depletion of RLE blood flow beyond the popliteal vessel at knee and similar findings in the left. Patient was transferred to [facility] for CO2 angiogram with Dr. Baltazar. Of note, patient also with reported visual disturbance for which patient was seen and evaluated by opthalmology. Internal Medicine has been consulted on Ms. Kirby's care for medical management.     # ADHF/ BiV HFrEF 20   - Recent admission at [facility] for ADHF and on dobutamine outpatient  - TTE 1/16 with EF 30-35 with moderately reduced LVSF and grade 2 ddfxn, normal RVSF , trace TR/ NM, and trace pericardial effusion  - NST abnormal with small-sized, moderate defect in apical wall that is predominantly fixed suggestive of an infarction with minimal marcus-infarct ischemia. Post stress LVEF 25.  - CT Chest with small BL pleural effusions and small pericardial effusion.  - RPT TTE with EF 20, severely decreased LV, decreased RVSF with TAPSE 1.2, and regional wall motion abnormalities present with entire septum, entire apex, entire inferior wall, mid inferolateral segment, and mid anterolateral segment are hypokinetic.   - RHC with RA 20, PA 49/29 (40), PCWP 35, mVO2 51.1, CO/CI 3.8/2.2, SVR 1095  - Continue on milrinone 0.5mcg GTT   - Continue on bumex 3mg GTT   - Continue on aldactone 50 QD   - Added augmentation with HTS and zaroxolyn 10 QD today   - Case discussed with EP and needs to be on GDMT for 3 months before AICD placement and should be stabilized off of inotropic support.   - Monitor I and O, diuresis per Cardio/ Renal    - GDMT as per Cardio  - Serial CXR   - Monitor volume status   - Check daily weights    - Monitor on telemetry; Monitor electrolytes   - Cardio, HF, Renal, and EP evals appreciated; F/u recs     # CAD with TVD   - LHC with RCA , severe ostial LM disease, diffuse disease in LAD and LCx, some L to R collaterals   - Pending CTSx evaluation for CABG    # BL LE Edema likely in setting of ADHF  - Diuresis as per Cardio and Renal   - BL LE elevation and compression  - LE Duplex neg   - Monitor for now  - Podiatry and Vascular evals appreciated; F/u recs    # Pericardial effusion likely in setting ADHF  - TTE with trace pericardial effusion   - CT Chest with small pericardial effusion   - Denies chest pain, palpitations, chest tightness or discomfort, shortness of breath or dyspnea   - Repeat TTE in 1 month for further evaluation   - Diuresis per cardio/ renal.  - Monitor on telemetry  - Cardio following    # Pleural effusion likely in setting ADHF  - CT Chest with small BL pleural effusions  - On RA with no respiratory complaints at present  - Monitor O2 saturation  - Supplement to maintain > 90%   - Diuresis per cardio/ renal.     # JAMEL with Hyponatremia and Metabolic Acidosis   - Baseline CRE 0.7-1.2 and increased to ~4  - As per OSH documentation, JAMEL was thought to be second to Unasyn/ Bumex / Entresto use and Hypotension   - US RENAL with no hydronephrosis  - CRE improving slightly with diuresis/ inotropic support and likely cardiorenal induced with low flow state in ADHF  - Continue with HF support as above  - Avoid nephrotoxic agents  - Monitor Cr and daily BMP   - Renal eval appreciated    # Leukocytosis likely in setting of BL LE ulcers with pop occlusion   - BCx negative   - UCx with probable contamination   - On unasyn for ABX. Frequency increased to Q12 as per Renal   - Leukocytosis up-trending. monitor closely. If febrile check pan cultures   - Trend CBC, temp curve, VS and adjust as tolerated    # Foot ulcers with worsening RLE pain second to pop artery occlusion +/- diabetic ulcers   - RLE Arterial Duplex with popliteal artery occlusion   - LLE Arterial Duplex with underlying disease cannot be excluded   - Patient transferred to [facility] for CO2 angiogram, however given ADHF currently not medically optimized and high risk. Plan to continue on diuresis and inotropic support as above   - Was on Heparin GTT for now and now on HSQ  - On Unasyn for ABX   - Continue on Lipitor  - Monitor PLT and HH while on AC   - Vascular following and given continued pain and slightly improving volume status discussing angio for next week   - Continue pain control with oxy  - Wound care called for dressing recs     # Tachycardia likely pain related   - On Toprol and improved  - Continue to monitor on tele   - Cardio eval appreciated    # Visual Disturbance  - CT Head w/ old infarcts  - On ASA and Statin   - Opthalmology eval appreciated    # Indigestion and Constipation   - PPI, miralax and senna continued  - MOnitor BMs    # Anemia likely mixed AOCD vs iron deficiency   - HH stable in 9s on HSQ  - Consider iron tabs when optimized   - Monitor HH   - Transfuse for Hgb < 8  - Maintain active T/S    # Diabetes Mellitus A1C 11.6   - Continue on lantus 10 with tradjecta 5 and ISS   - Endocrine following    # HLD and HLD  - Continue on lipitor and toprol  - Monitor BP    # DISPO TBD

## 2025-02-16 NOTE — PROGRESS NOTE ADULT - ASSESSMENT
64F, hx of CHF (last TTE on 1/16/25 EF 30-35%, G2DD), DM, diabetic foot ulcer with a recent admission at [facility] for CHF exacerbation 1/14-1/17 p/w worsening R. leg pain and fluid secretion, was meeting sepsis criteria with podiatry having low suspicion for right cellulitis and admitted for continued management of HF exacerbation and needing ischemic eval.     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  -Left leg Arterial Duplex: Slightly tardus parvus waveform of the left popliteal artery is noted, for which underlying disease cannot be excluded. Posterior tibial artery waveform is nonpulsatile. Anterior tibial artery tardus parvus flow is noted.   Transferred to [facility] for CO2 angiogram as per vascular surgery    #  PAD Wound of lower extremity.   ·  Plan: Podiatry following, b/l serous bullae, no concerns for infection  Found to have RLE coolness  -Right Leg Arterial Duplex: Popliteal artery is occluded with negligible flow of right trifurcation arteries.  -Left leg Arterial Duplex: Slightly tardus parvus waveform of the left popliteal artery is noted, for which underlying disease cannot be excluded. Posterior tibial artery waveform is nonpulsatile. Anterior tibial artery tardus parvus flow is noted.  -Started on heparin gtt and dobutamine gtt -Will transfer to [facility] for CO2 angiogram as per vascular surgery as not candidate for CTA due to worsening SCr  -Keep compression dressing  -LE elevation above heart level at rest.  -Transferred to [facility] for CO2 angiogram   - Overall this patient is at   intermediate  risk (for cardiac death, nonfatal myocardial infarction, and nonfatal cardiac arrest perioperatively for this intermediate  risk procedure).   No cardiac contraindications for CO2 vangiogram  There  are  no further recommendation for risk stratifying imaging/stress testing prior to planned surgery  Seen by Podiatry-No acute pod intervention, local wound care only- Pod stable for discharge   PAD with rest ischemia plan for Angiogram Wednesday-Dr Louis        # HFrEF (congestive heart failure). Ischemic Cardiomyopathy  ·  Plan: Hx of HF, previous admission in January, on home Lasix 40 qD, Metoprolol Succ 25 qD. Not on Entresto due to insurance issues  Last TTE: LVSF moderately decreased w/ EF 30-35 %. Moderate G2DD. Mild MR  Stress test: small-sized, moderate defect(s) in the apical wall that is predominantly fixed suggestive of an infarction with minimal marcus-infarct ischemia  Ischemic cardiomyopathy  GDMT on hold 2/2 JAMEL    Repeat TTE EF 20% with WMA  Likely Ischemic cardiomyopathy despite NST revealing fixed defectLHC confirming  Multivessel CAD    LHC-RCA , severe ostial LM disease, diffuse disease in LAD and LCx, some L to R collaterals-seen by CTS advise cMR for viability poor target vessels for CABG-?High risk LM/LAD PCI    RHC: RA 20, PA 49/29 (40), PCWP 35, mVO2 51.1, CO/CI 3.8/2.2, SVR 1095    Started on Milrinone to assist augmented diuresis in attempt to avoid further renal failure  Has lost Weight 170---> 164 Kg  Creatinine gradually rising cut back on diuresis        LE EDEMA no DVT      # JAMEL  Creatinine Trend: 2.81<--, 2.80<--, 2.74<--2.40<-- 2.37<--2.50<-- 2.98<--, 3.31<--, 2.65<--, 3.90<-- 1.17  Cardiorenal

## 2025-02-16 NOTE — PROGRESS NOTE ADULT - SUBJECTIVE AND OBJECTIVE BOX
Name of Patient : TOMASZ ALBA  MRN: [id number]  Date of visit: 02-16-25      Subjective: Patient seen and examined. No new events except as noted.   pain better     REVIEW OF SYSTEMS:    CONSTITUTIONAL: No weakness, fevers or chills  EYES/ENT: No visual changes;  No vertigo or throat pain   NECK: No pain or stiffness  RESPIRATORY: No cough, wheezing, hemoptysis; No shortness of breath  CARDIOVASCULAR: No chest pain or palpitations  GASTROINTESTINAL: No abdominal or epigastric pain. No nausea, vomiting, or hematemesis; No diarrhea or constipation. No melena or hematochezia.  GENITOURINARY: No dysuria, frequency or hematuria  NEUROLOGICAL: No numbness or weakness  SKIN: No itching, burning, rashes, or lesions   All other review of systems is negative unless indicated above.    MEDICATIONS:  MEDICATIONS  (STANDING):  ampicillin/sulbactam  IVPB 3 Gram(s) IV Intermittent every 12 hours  ampicillin/sulbactam  IVPB      aspirin enteric coated 81 milliGRAM(s) Oral daily  atorvastatin 40 milliGRAM(s) Oral at bedtime  benzocaine/menthol Lozenge 1 Lozenge Oral once  bisacodyl 5 milliGRAM(s) Oral every 12 hours  buMETAnide Infusion 3 mG/Hr (15 mL/Hr) IV Continuous <Continuous>  dextrose 5%. 1000 milliLiter(s) (100 mL/Hr) IV Continuous <Continuous>  dextrose 5%. 1000 milliLiter(s) (50 mL/Hr) IV Continuous <Continuous>  dextrose 50% Injectable 25 Gram(s) IV Push once  dextrose 50% Injectable 12.5 Gram(s) IV Push once  dextrose 50% Injectable 25 Gram(s) IV Push once  glucagon  Injectable 1 milliGRAM(s) IntraMuscular once  heparin   Injectable 5000 Unit(s) SubCutaneous every 8 hours  insulin glargine Injectable (LANTUS) 10 Unit(s) SubCutaneous at bedtime  insulin lispro (ADMELOG) corrective regimen sliding scale   SubCutaneous three times a day before meals  insulin lispro (ADMELOG) corrective regimen sliding scale   SubCutaneous at bedtime  iron sucrose IVPB 200 milliGRAM(s) IV Intermittent every 24 hours  linagliptin 5 milliGRAM(s) Oral daily  milrinone Infusion 0.25 MICROgram(s)/kG/Min (5.41 mL/Hr) IV Continuous <Continuous>  pantoprazole  Injectable 40 milliGRAM(s) IV Push daily  polyethylene glycol 3350 17 Gram(s) Oral daily  senna 2 Tablet(s) Oral at bedtime  sodium chloride 3% Bolus 150 milliLiter(s) IV Bolus <User Schedule>  spironolactone 50 milliGRAM(s) Oral daily      PHYSICAL EXAM:  T(C): 36.9 (02-16-25 @ 21:13), Max: 37.1 (02-16-25 @ 17:01)  HR: 110 (02-16-25 @ 21:13) (99 - 111)  BP: 112/71 (02-16-25 @ 21:13) (100/63 - 112/71)  RR: 18 (02-16-25 @ 21:13) (18 - 18)  SpO2: 93% (02-16-25 @ 21:13) (93% - 94%)  Wt(kg): --  I&O's Summary    15 Feb 2025 07:01  -  16 Feb 2025 07:00  --------------------------------------------------------  IN: 2333.8 mL / OUT: 2750 mL / NET: -416.2 mL    16 Feb 2025 07:01  -  16 Feb 2025 23:26  --------------------------------------------------------  IN: 1077 mL / OUT: 1100 mL / NET: -23 mL          Appearance: Normal	  HEENT:  PERRLA   Lymphatic: No lymphadenopathy   Cardiovascular: Normal S1 S2, no JVD  Respiratory: normal effort , clear  Gastrointestinal:  Soft, Non-tender  Skin: No rashes,  warm to touch  Psychiatry:  Mood & affect appropriate  Musculuskeletal: LE dressing     recent labs, Imaging and EKGs personally reviewed   CODE status discussed with the patient in detail    02-15-25 @ 07:01  -  02-16-25 @ 07:00  --------------------------------------------------------  IN: 2333.8 mL / OUT: 2750 mL / NET: -416.2 mL    02-16-25 @ 07:01  -  02-16-25 @ 23:26  --------------------------------------------------------  IN: 1077 mL / OUT: 1100 mL / NET: -23 mL                          8.5    16.56 )-----------( 385      ( 15 Feb 2025 07:22 )             25.3               02-16    142  |  94[L]  |  65[H]  ----------------------------<  153[H]  3.7   |  24  |  2.95[H]    Ca    9.6      16 Feb 2025 19:34  Mg     2.0     02-16                         Urinalysis Basic - ( 16 Feb 2025 19:34 )    Color: x / Appearance: x / SG: x / pH: x  Gluc: 153 mg/dL / Ketone: x  / Bili: x / Urobili: x   Blood: x / Protein: x / Nitrite: x   Leuk Esterase: x / RBC: x / WBC x   Sq Epi: x / Non Sq Epi: x / Bacteria: x

## 2025-02-16 NOTE — PROGRESS NOTE ADULT - ASSESSMENT
64y Female with history of CHF presents as a transfer for LE CO2 angiogram. Nephrology consulted for elevated Scr.    1) JAMEL: likely due to ATN in addition to CRS. Scr uptrending on 2/15, Unclear if this is due to an JAMEL or due to a decrease in volume overload. Monitor CMP daily. UA relatively bland. FeUrea low consistent with low flow state. Renal US unremarkable. Bladder scan on 2/3 negative. Avoid nephrotoxins.    2) HTN: BP low normal. Monitor off anti-hypertensive medications.    3) LE edema: Not secondary to nephrotic syndrome given subnephrotic range proteinuria. Continue with bumex gtt @ 3 mg/hour with metolazone, aldactone and HTS boluses. Continue milrinone gtt  as per Cardiology. TTE with severely decreased LVSF. Monitor UO. Recommend checking CMP twice daily while on high doses of Bumex.    4) Hyperphosphatemia: Resolved. Continue with low phosphorus diet.     [facility] NEPHROLOGY  Raji Waterman M.D.  Mars Feliz D.O.  Kelley Parks M.D.  MD Nancy Kulkarni, MSN, ANP-C    Telephone: [phone]  Facsimile: [phone] [address], [address]  [place] [address]

## 2025-02-16 NOTE — PROGRESS NOTE ADULT - SUBJECTIVE AND OBJECTIVE BOX
[facility] NEPHROLOGY- PROGRESS NOTE    64y Female with history of CHF presents as a transfer for LE CO2 angiogram. Nephrology consulted for elevated Scr.    REVIEW OF SYSTEMS:  Gen: no fevers  Cards: no chest pain  Resp: + dyspnea with exertion, + cough  GI: no nausea and vomiting, no diarrhea  Vascular: + LE edema improving.   Msk: Persistent pain upon moving legs.    No Known Allergies      Hospital Medications: Medications reviewed      VITALS:  T(F): 98.8 (02-16-25 @ 17:01), Max: 98.8 (02-16-25 @ 17:01)  HR: 106 (02-16-25 @ 17:01)  BP: 101/66 (02-16-25 @ 17:01)  RR: 18 (02-16-25 @ 17:01)  SpO2: 93% (02-16-25 @ 17:01)  Wt(kg): --    02-15 @ 07:01  -  02-16 @ 07:00  --------------------------------------------------------  IN: 2333.8 mL / OUT: 2750 mL / NET: -416.2 mL    02-16 @ 07:01  -  02-16 @ 17:18  --------------------------------------------------------  IN: 0 mL / OUT: 800 mL / NET: -800 mL            PHYSICAL EXAM:    Gen: NAD, calm  Cards: RRR, +S1/S2, no M/G/R  Resp: bibasilar rales  GI: soft, NT/ND, NABS  Vascular: 2+ RLE edema > LLE edema with legs wrapped      LABS:  02-16    139  |  91[L]  |  66[H]  ----------------------------<  124[H]  3.4[L]   |  29  |  2.81[H]    Ca    9.5      16 Feb 2025 06:35  Mg     2.0     02-16      Creatinine Trend: 2.81 <--, 2.80 <--, 2.74 <--, 2.40 <--, 2.39 <--, 2.37 <--, 2.40 <--, 2.50 <--, 2.39 <--, 2.49 <--, 2.42 <--, 2.29 <--                        8.5    16.56 )-----------( 385      ( 15 Feb 2025 07:22 )             25.3     Urine Studies:  Urinalysis Basic - ( 16 Feb 2025 06:35 )    Color:  / Appearance:  / SG:  / pH:   Gluc: 124 mg/dL / Ketone:   / Bili:  / Urobili:    Blood:  / Protein:  / Nitrite:    Leuk Esterase:  / RBC:  / WBC    Sq Epi:  / Non Sq Epi:  / Bacteria:

## 2025-02-17 LAB
ANION GAP SERPL CALC-SCNC: 25 MMOL/L — HIGH (ref 5–17)
BUN SERPL-MCNC: 66 MG/DL — HIGH (ref 7–23)
CALCIUM SERPL-MCNC: 9.1 MG/DL — SIGNIFICANT CHANGE UP (ref 8.4–10.5)
CHLORIDE SERPL-SCNC: 106 MMOL/L — SIGNIFICANT CHANGE UP (ref 96–108)
CO2 SERPL-SCNC: 19 MMOL/L — LOW (ref 22–31)
CREAT SERPL-MCNC: 2.93 MG/DL — HIGH (ref 0.5–1.3)
EGFR: 17 ML/MIN/1.73M2 — LOW
EGFR: 17 ML/MIN/1.73M2 — LOW
GLUCOSE BLDC GLUCOMTR-MCNC: 172 MG/DL — HIGH (ref 70–99)
GLUCOSE BLDC GLUCOMTR-MCNC: 197 MG/DL — HIGH (ref 70–99)
GLUCOSE BLDC GLUCOMTR-MCNC: 206 MG/DL — HIGH (ref 70–99)
GLUCOSE BLDC GLUCOMTR-MCNC: 227 MG/DL — HIGH (ref 70–99)
GLUCOSE SERPL-MCNC: 154 MG/DL — HIGH (ref 70–99)
HCT VFR BLD CALC: 25.3 % — LOW (ref 34.5–45)
HGB BLD-MCNC: 8.4 G/DL — LOW (ref 11.5–15.5)
MAGNESIUM SERPL-MCNC: 1.9 MG/DL — SIGNIFICANT CHANGE UP (ref 1.6–2.6)
MCHC RBC-ENTMCNC: 25.2 PG — LOW (ref 27–34)
MCHC RBC-ENTMCNC: 33.2 G/DL — SIGNIFICANT CHANGE UP (ref 32–36)
MCV RBC AUTO: 76 FL — LOW (ref 80–100)
NRBC BLD AUTO-RTO: 0 /100 WBCS — SIGNIFICANT CHANGE UP (ref 0–0)
PLATELET # BLD AUTO: 383 K/UL — SIGNIFICANT CHANGE UP (ref 150–400)
POTASSIUM SERPL-MCNC: 3.4 MMOL/L — LOW (ref 3.5–5.3)
POTASSIUM SERPL-SCNC: 3.4 MMOL/L — LOW (ref 3.5–5.3)
RBC # BLD: 3.33 M/UL — LOW (ref 3.8–5.2)
RBC # FLD: 17.8 % — HIGH (ref 10.3–14.5)
SODIUM SERPL-SCNC: 150 MMOL/L — HIGH (ref 135–145)
WBC # BLD: 15.13 K/UL — HIGH (ref 3.8–10.5)
WBC # FLD AUTO: 15.13 K/UL — HIGH (ref 3.8–10.5)

## 2025-02-17 PROCEDURE — 71045 X-RAY EXAM CHEST 1 VIEW: CPT | Mod: 26

## 2025-02-17 RX ORDER — BUMETANIDE 1 MG/1
4 TABLET ORAL EVERY 8 HOURS
Refills: 0 | Status: DISCONTINUED | OUTPATIENT
Start: 2025-02-17 | End: 2025-02-17

## 2025-02-17 RX ORDER — OXYCODONE HYDROCHLORIDE 30 MG/1
5 TABLET ORAL EVERY 4 HOURS
Refills: 0 | Status: DISCONTINUED | OUTPATIENT
Start: 2025-02-17 | End: 2025-02-21

## 2025-02-17 RX ADMIN — BUMETANIDE 15 MG/HR: 1 TABLET ORAL at 03:32

## 2025-02-17 RX ADMIN — HEPARIN SODIUM 5000 UNIT(S): 1000 INJECTION INTRAVENOUS; SUBCUTANEOUS at 05:03

## 2025-02-17 RX ADMIN — BUMETANIDE 132 MILLIGRAM(S): 1 TABLET ORAL at 10:16

## 2025-02-17 RX ADMIN — INSULIN LISPRO 1: 100 INJECTION, SOLUTION INTRAVENOUS; SUBCUTANEOUS at 13:19

## 2025-02-17 RX ADMIN — Medication 40 MILLIEQUIVALENT(S): at 10:16

## 2025-02-17 RX ADMIN — Medication 650 MILLIGRAM(S): at 21:13

## 2025-02-17 RX ADMIN — Medication 5 MILLIGRAM(S): at 05:04

## 2025-02-17 RX ADMIN — OXYCODONE HYDROCHLORIDE 5 MILLIGRAM(S): 30 TABLET ORAL at 21:13

## 2025-02-17 RX ADMIN — Medication 50 MILLIGRAM(S): at 05:04

## 2025-02-17 RX ADMIN — OXYCODONE HYDROCHLORIDE 10 MILLIGRAM(S): 30 TABLET ORAL at 05:40

## 2025-02-17 RX ADMIN — OXYCODONE HYDROCHLORIDE 10 MILLIGRAM(S): 30 TABLET ORAL at 05:03

## 2025-02-17 RX ADMIN — LINAGLIPTIN 5 MILLIGRAM(S): 5 TABLET, FILM COATED ORAL at 12:17

## 2025-02-17 RX ADMIN — INSULIN LISPRO 1: 100 INJECTION, SOLUTION INTRAVENOUS; SUBCUTANEOUS at 08:56

## 2025-02-17 RX ADMIN — Medication 2 TABLET(S): at 21:35

## 2025-02-17 RX ADMIN — Medication 81 MILLIGRAM(S): at 12:17

## 2025-02-17 RX ADMIN — ATORVASTATIN CALCIUM 40 MILLIGRAM(S): 80 TABLET, FILM COATED ORAL at 21:36

## 2025-02-17 RX ADMIN — IRON SUCROSE 110 MILLIGRAM(S): 20 INJECTION, SOLUTION INTRAVENOUS at 12:18

## 2025-02-17 RX ADMIN — SODIUM CHLORIDE 300 MILLILITER(S): 3 INJECTION, SOLUTION INTRAVENOUS at 05:04

## 2025-02-17 RX ADMIN — Medication 40 MILLIGRAM(S): at 12:18

## 2025-02-17 RX ADMIN — AMPICILLIN SODIUM AND SULBACTAM SODIUM 200 GRAM(S): 1; .5 INJECTION, POWDER, FOR SOLUTION INTRAMUSCULAR; INTRAVENOUS at 13:57

## 2025-02-17 RX ADMIN — HEPARIN SODIUM 5000 UNIT(S): 1000 INJECTION INTRAVENOUS; SUBCUTANEOUS at 21:36

## 2025-02-17 RX ADMIN — HEPARIN SODIUM 5000 UNIT(S): 1000 INJECTION INTRAVENOUS; SUBCUTANEOUS at 13:20

## 2025-02-17 RX ADMIN — INSULIN GLARGINE-YFGN 10 UNIT(S): 100 INJECTION, SOLUTION SUBCUTANEOUS at 21:35

## 2025-02-17 RX ADMIN — BUMETANIDE 132 MILLIGRAM(S): 1 TABLET ORAL at 13:57

## 2025-02-17 RX ADMIN — Medication 650 MILLIGRAM(S): at 20:36

## 2025-02-17 RX ADMIN — MILRINONE LACTATE 5.41 MICROGRAM(S)/KG/MIN: 1 INJECTION, SOLUTION INTRAVENOUS at 03:32

## 2025-02-17 RX ADMIN — AMPICILLIN SODIUM AND SULBACTAM SODIUM 200 GRAM(S): 1; .5 INJECTION, POWDER, FOR SOLUTION INTRAMUSCULAR; INTRAVENOUS at 21:26

## 2025-02-17 RX ADMIN — OXYCODONE HYDROCHLORIDE 5 MILLIGRAM(S): 30 TABLET ORAL at 20:37

## 2025-02-17 RX ADMIN — MILRINONE LACTATE 5.41 MICROGRAM(S)/KG/MIN: 1 INJECTION, SOLUTION INTRAVENOUS at 20:36

## 2025-02-17 RX ADMIN — INSULIN LISPRO 2: 100 INJECTION, SOLUTION INTRAVENOUS; SUBCUTANEOUS at 17:32

## 2025-02-17 RX ADMIN — Medication 5 MILLIGRAM(S): at 17:32

## 2025-02-17 NOTE — PROGRESS NOTE ADULT - SUBJECTIVE AND OBJECTIVE BOX
MR#[id number]  PATIENT NAME:COLE ALBA    DATE OF SERVICE: 02-17-25 @ 06:44  Patient was seen and examined by Yordy Gilmore MD on    02-17-25 @ 06:44 .  Interim events noted.Consultant notes ,Labs,Telemetry reviewed by me       HOSPITAL COURSE:    :64F, hx of CHF (last TTE on 1/16/25 EF 30-35%, G2DD), DM, diabetic foot ulcer with a recent admission at [facility] for CHF exacerbation presented as a transfer for worsening R. leg pain and fluid secretion, On non-invasive imaging demonstrating severe depletion of RLE blood flow beyond the popliteal vessel at knee and similar findings in L. Was transferred to [facility] for CO2 angiogram with Dr. Baltazar. (29 Jan 2025 20:05)    Transferred from [facility]-TTE on previous admission: LVSF moderately decreased w/EF 30-35%. Moderate G2DD. Mild MR. Ischemic evaluation was recommended for which patient undergone stress test which revealed a small-sized moderate defect in the apical wall that is predominantly fixed suggestive of an infarction with minimal marcus-infarct ischemia. 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 Left leg Arterial Duplex: Slightly tardus parvus waveform of the left popliteal artery is noted, for which underlying disease cannot be excluded. Posterior tibial artery waveform is nonpulsatile. Anterior tibial artery tardus parvus flow is noted. Patient started on Heparin and Dobutamine drip with transfer to [facility] for further management.        INTERIM EVENTS:Patient seen at bedside ,interim events noted.  Awake alert remains on Bumex gtt and  gtt at 2.5mcg CO2 Angiogram postponed to next week-she is still orthopneac  02/08-on  5mcg and Bumex gtt   02/10-OOB still orthopneac [id number] LE weakness and swelling  02/11-c/o LE pain not dyspneac  held on Bumex 3 mg/Hr  02/12-Awake seen by HF   02/13-Remains on Bumex 3mg/Hr for possible LHC/RHC  02/14-Had cath Multivessel CAD started on Milrinone for CTS evaluation albeit targets not optimal  02/15-Awake on Milrinone and Bumex gtt-seen by CTS not a surgical candidate-needs Vascular work-up for LE PAD-scheduled for Angiogram on Wednesday    Weight 170Kg---> 164 Kg--->161.1 Kg  02/16-Awake is able to lie flat c/o RLE pain Sinus Rhythm UOP-2750mL,on Milrinone 0.25mcg Bumex 3mg/Hr,Metolazone 10mg  02/17-Awake is able to lie flat remains on Milrinone and Bumex 3mg/Hr UOP-1500mL-          PMH -reviewed admission note, no change since admission  HEART FAILURE: Acute[x ]Chronic[x ] Systolic[x ] Diastolic[ ] Combined Systolic and Diastolic[ ]  CAD[ ] CABG[ ] PCI[ ]  DEVICES[ ] PPM[ ] ICD[ ] ILR[ ]  ATRIAL FIBRILLATION[ ] Paroxysmal[ ] Permanent[ ] CHADS2-[  ]  JAMEL[ ] CKD1[ ] CKD2[ ] CKD3[ ] CKD4[ ] ESRD[ ]  COPD[ ] HTN[ ]   DM[ ] Type1[ ] Type 2[ ]   CVA[ ] Paresis[ ]    AMBULATION: Assisted[ ] Cane/walker[ ] Independent[ x]    MEDICATIONS  (STANDING):  ampicillin/sulbactam  IVPB 3 Gram(s) IV Intermittent every 12 hours  ampicillin/sulbactam  IVPB      aspirin enteric coated 81 milliGRAM(s) Oral daily  atorvastatin 40 milliGRAM(s) Oral at bedtime  benzocaine/menthol Lozenge 1 Lozenge Oral once  bisacodyl 5 milliGRAM(s) Oral every 12 hours  buMETAnide Infusion 3 mG/Hr (15 mL/Hr) IV Continuous <Continuous>  dextrose 5%. 1000 milliLiter(s) (100 mL/Hr) IV Continuous <Continuous>  dextrose 5%. 1000 milliLiter(s) (50 mL/Hr) IV Continuous <Continuous>  dextrose 50% Injectable 25 Gram(s) IV Push once  dextrose 50% Injectable 12.5 Gram(s) IV Push once  dextrose 50% Injectable 25 Gram(s) IV Push once  glucagon  Injectable 1 milliGRAM(s) IntraMuscular once  heparin   Injectable 5000 Unit(s) SubCutaneous every 8 hours  insulin glargine Injectable (LANTUS) 10 Unit(s) SubCutaneous at bedtime  insulin lispro (ADMELOG) corrective regimen sliding scale   SubCutaneous at bedtime  insulin lispro (ADMELOG) corrective regimen sliding scale   SubCutaneous three times a day before meals  iron sucrose IVPB 200 milliGRAM(s) IV Intermittent every 24 hours  linagliptin 5 milliGRAM(s) Oral daily  milrinone Infusion 0.25 MICROgram(s)/kG/Min (5.41 mL/Hr) IV Continuous <Continuous>  pantoprazole  Injectable 40 milliGRAM(s) IV Push daily  polyethylene glycol 3350 17 Gram(s) Oral daily  senna 2 Tablet(s) Oral at bedtime  sodium chloride 3% Bolus 150 milliLiter(s) IV Bolus <User Schedule>  spironolactone 50 milliGRAM(s) Oral daily    MEDICATIONS  (PRN):  acetaminophen     Tablet .. 650 milliGRAM(s) Oral every 6 hours PRN Mild Pain (1 - 3)  dextrose Oral Gel 15 Gram(s) Oral once PRN Blood Glucose LESS THAN 70 milliGRAM(s)/deciliter  melatonin 3 milliGRAM(s) Oral at bedtime PRN Insomnia  ondansetron Injectable 4 milliGRAM(s) IV Push every 6 hours PRN Nausea and/or Vomiting  oxyCODONE    IR 10 milliGRAM(s) Oral every 4 hours PRN Severe Pain (7 - 10)  sodium chloride 0.65% Nasal 1 Spray(s) Both Nostrils three times a day PRN Dryness            REVIEW OF SYSTEMS:  Constitutional: [ ] fever, [ ]weight loss,  [ ]fatigue [ ]weight gain  Eyes: [ ] visual changes  Respiratory: [ ]shortness of breath;  [ ] cough, [ ]wheezing, [ ]chills, [ ]hemoptysis  Cardiovascular: [ ] chest pain, [ ]palpitations, [ ]dizziness,  [ ]leg swelling[ ]orthopnea[ ]PND  Gastrointestinal: [ ] abdominal pain, [ ]nausea, [ ]vomiting,  [ ]diarrhea [ ]Constipation [ ]Melena  Genitourinary: [ ] dysuria, [ ] hematuria [ ][place]  Neurologic: [ ] headaches [ ] tremors[ ]weakness [ ]Paralysis Right[ ] Left[ ]  Skin: [ ] itching, [ ]burning, [ ] rashes  Endocrine: [ ] heat or cold intolerance  Musculoskeletal: [ ] joint pain or swelling; [ ] muscle, back, or extremity pain  Psychiatric: [ ] depression, [ ]anxiety, [ ]mood swings, or [ ]difficulty sleeping  Hematologic: [ ] easy bruising, [ ] bleeding gums    [ ] All remaining systems negative except as per above.   [ ]Unable to obtain.  [x] No change in ROS since admission      Vital Signs Last 24 Hrs  T(C): 36.8 (17 Feb 2025 05:05), Max: 37.1 (16 Feb 2025 17:01)  T(F): 98.2 (17 Feb 2025 05:05), Max: 98.8 (16 Feb 2025 17:01)  HR: 105 (17 Feb 2025 05:05) (105 - 111)  BP: 107/67 (17 Feb 2025 05:05) (100/63 - 112/71)  RR: 18 (17 Feb 2025 05:05) (18 - 18)  SpO2: 93% (17 Feb 2025 05:05) (93% - 93%)    Parameters below as of 17 Feb 2025 05:05  Patient On (Oxygen Delivery Method): room air      I&O's Summary    15 Feb 2025 07:01  -  16 Feb 2025 07:00  --------------------------------------------------------  IN: 2333.8 mL / OUT: 2750 mL / NET: -416.2 mL    16 Feb 2025 07:01  -  17 Feb 2025 06:44  --------------------------------------------------------  IN: 1598.9 mL / OUT: 1500 mL / NET: 98.9 mL        PHYSICAL EXAM:  General: No acute distress BMI-24  HEENT: EOMI, PERRL  Neck: Supple, [ ] JVD  Lungs: Equal air entry bilaterally; [ ] rales [ ] wheezing [ ] rhonchi  Heart: Regular rate and rhythm; [x ] murmur   2/6 [ x] systolic [ ] diastolic [ ] radiation[ ] rubs [ ]  gallops  Abdomen: Nontender, bowel sounds present  Extremities: No clubbing, cyanosis, [ ] edema [x]Bilateral lower extremity venous stasis wounds to dermis, mild serous drainage, no acute signs of infection. Left foot hallux distal tuft well adhered eschar, no acute signs of infection.    Nervous system:  Alert & Oriented X3, no focal deficits  Psychiatric: Normal affect  Skin: No rashes or lesions    LABS:  02-16    142  |  94[L]  |  65[H]  ----------------------------<  153[H]  3.7   |  24  |  2.95[H]    Ca    9.6      16 Feb 2025 19:34  Mg     2.0     02-16      Creatinine Trend: 2.95<--, 2.81<--, 2.80<--, 2.74<--, 2.40<--, 2.39<--                        8.4    15.13 )-----------( 383      ( 17 Feb 2025 06:18 )             25.3          TTE W or WO Ultrasound Enhancing Agent (02.10.25 @ 12:09) >  CONCLUSIONS:      1. Left ventricular cavity is mildly dilated. Left ventricular systolic function is severely decreased with an ejection fraction of 20 % by Winchester's method of disks. Regional wall motion abnormalities present.   2. Multiple segmental abnormalities exist. See findings.   The entire septum, entire apex, entire inferior wall, mid inferolateral segment, and mid anterolateral segment are hypokinetic. All remaining scored segments are normal.     3. Reduced right ventricular systolic function.   4. No pericardial effusion seen.   5. Compared to the transthoracic echocardiogram performed on 1/16/2025, Worsening of the left ventricular function.        VA Duplex Lower Ext Vein Scan, Bilat (02.10.25 @ 17:42) >  IMPRESSION: No evidence of bilateral DVT within the imaged veins.        Nuclear Stress Test-Pharmacologic.. (01.24.25 @ 10:31) >  Conclusions:   1. Myocardial Perfusion: Abnormal.   2. Qualitative Perfusion:      - small-sized, moderate defect(s) in the apical wall that is predominantly fixed suggestive of an infarction with minimal marcus-infarct ischemia.   3. The post stress left ventricular EF is 25 %. The stress end diastolic volume is 118 ml.   4. Results D/Wcardiologist Dr. Gilmore at 18:31        RHC: RA 20, PA 49/29 (40), PCWP 35, mVO2 51.1, CO/CI 3.8/2.2, SVR 1095  LHC: RCA , severe ostial LM disease, diffuse disease in LAD and LCx, some L to R collaterals

## 2025-02-17 NOTE — PROGRESS NOTE ADULT - ASSESSMENT
64F, hx of CHF (last TTE on 1/16/25 EF 30-35%, G2DD), DM, diabetic foot ulcer with a recent admission at [facility] for CHF exacerbation 1/14-1/17 p/w worsening R. leg pain and fluid secretion, was meeting sepsis criteria with podiatry having low suspicion for right cellulitis and admitted for continued management of HF exacerbation and needing ischemic eval.     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  -Left leg Arterial Duplex: Slightly tardus parvus waveform of the left popliteal artery is noted, for which underlying disease cannot be excluded. Posterior tibial artery waveform is nonpulsatile. Anterior tibial artery tardus parvus flow is noted.   Transferred to [facility] for CO2 angiogram as per vascular surgery    #  PAD Wound of lower extremity.   ·  Plan: Podiatry following, b/l serous bullae, no concerns for infection  Found to have RLE coolness  -Right Leg Arterial Duplex: Popliteal artery is occluded with negligible flow of right trifurcation arteries.  -Left leg Arterial Duplex: Slightly tardus parvus waveform of the left popliteal artery is noted, for which underlying disease cannot be excluded. Posterior tibial artery waveform is nonpulsatile. Anterior tibial artery tardus parvus flow is noted.  -Started on heparin gtt and dobutamine gtt -Will transfer to [facility] for CO2 angiogram as per vascular surgery as not candidate for CTA due to worsening SCr  -Keep compression dressing  -LE elevation above heart level at rest.  -Transferred to [facility] for CO2 angiogram   - Overall this patient is at   intermediate  risk (for cardiac death, nonfatal myocardial infarction, and nonfatal cardiac arrest perioperatively for this intermediate  risk procedure).   No cardiac contraindications for CO2 vangiogram  There  are  no further recommendation for risk stratifying imaging/stress testing prior to planned surgery  Seen by Podiatry-No acute pod intervention, local wound care only- Pod stable for discharge   PAD with rest ischemia plan for Angiogram Wednesday-Dr Louis        # HFrEF (congestive heart failure). Ischemic Cardiomyopathy  ·  Plan: Hx of HF, previous admission in January, on home Lasix 40 qD, Metoprolol Succ 25 qD. Not on Entresto due to insurance issues  Last TTE: LVSF moderately decreased w/ EF 30-35 %. Moderate G2DD. Mild MR  Stress test: small-sized, moderate defect(s) in the apical wall that is predominantly fixed suggestive of an infarction with minimal marcus-infarct ischemia  Ischemic cardiomyopathy  GDMT on hold 2/2 JAMEL    Repeat TTE EF 20% with WMA  Likely Ischemic cardiomyopathy despite NST revealing fixed defectLHC confirming  Multivessel CAD    LHC-RCA , severe ostial LM disease, diffuse disease in LAD and LCx, some L to R collaterals-seen by CTS advise cMR for viability poor target vessels for CABG-?High risk LM/LAD PCI    RHC: RA 20, PA 49/29 (40), PCWP 35, mVO2 51.1, CO/CI 3.8/2.2, SVR 1095    Started on Milrinone to assist augmented diuresis in attempt to avoid further renal failure  Has lost Weight 170---> 164 Kg---> 161.1 Kg  Creatinine gradually rising cut back on diuresis  Consider holding diuretics for 12-24 hrs        LE EDEMA no DVT      # JAMEL  Creatinine Trend: 2.95 <--2.81<--, 2.80<--, 2.74<--2.40<-- 2.37<--2.50<-- 2.98<--, 3.31<--, 2.65<--, 3.90<-- 1.17  Renal function worsening

## 2025-02-17 NOTE — PROGRESS NOTE ADULT - SUBJECTIVE AND OBJECTIVE BOX
Name of Patient : TOMASZ ALBA  MRN: [id number]  Date of visit: 02-17-25 @ 12:33      Subjective: Patient seen and examined. No new events except as noted.   Patient seen earlier this AM. Brother, Galo, at the bedside.   Patient reports feeling better overall. LE Edema with improvement.   Hypernatremic - discussed with HF and hypertonic saline discontinued.    Pending cardiac MRI. Tentatively planned for angio with Vascular surgery this week.   Denies CP, Palpitations, SOB ir dyspnea.     REVIEW OF SYSTEMS:    CONSTITUTIONAL: Generalized weakness, No fevers or chills  EYES/ENT: No visual changes;  No vertigo or throat pain   NECK: No pain or stiffness  RESPIRATORY: No cough, wheezing, hemoptysis; No shortness of breath  CARDIOVASCULAR: No chest pain or palpitations  GASTROINTESTINAL: No abdominal or epigastric pain. No nausea, vomiting, or hematemesis; No diarrhea or constipation. No melena or hematochezia.  GENITOURINARY: No dysuria, frequency or hematuria  NEUROLOGICAL: No numbness or weakness  SKIN/ MSK; B/L LE wounds; Pain controlled   All other review of systems is negative unless indicated above.    MEDICATIONS:  MEDICATIONS  (STANDING):  ampicillin/sulbactam  IVPB 3 Gram(s) IV Intermittent every 12 hours  ampicillin/sulbactam  IVPB      aspirin enteric coated 81 milliGRAM(s) Oral daily  atorvastatin 40 milliGRAM(s) Oral at bedtime  benzocaine/menthol Lozenge 1 Lozenge Oral once  bisacodyl 5 milliGRAM(s) Oral every 12 hours  buMETAnide IVPB 4 milliGRAM(s) IV Intermittent every 8 hours  dextrose 5%. 1000 milliLiter(s) (100 mL/Hr) IV Continuous <Continuous>  dextrose 5%. 1000 milliLiter(s) (50 mL/Hr) IV Continuous <Continuous>  dextrose 50% Injectable 25 Gram(s) IV Push once  dextrose 50% Injectable 12.5 Gram(s) IV Push once  dextrose 50% Injectable 25 Gram(s) IV Push once  glucagon  Injectable 1 milliGRAM(s) IntraMuscular once  heparin   Injectable 5000 Unit(s) SubCutaneous every 8 hours  insulin glargine Injectable (LANTUS) 10 Unit(s) SubCutaneous at bedtime  insulin lispro (ADMELOG) corrective regimen sliding scale   SubCutaneous three times a day before meals  insulin lispro (ADMELOG) corrective regimen sliding scale   SubCutaneous at bedtime  linagliptin 5 milliGRAM(s) Oral daily  milrinone Infusion 0.25 MICROgram(s)/kG/Min (5.41 mL/Hr) IV Continuous <Continuous>  pantoprazole  Injectable 40 milliGRAM(s) IV Push daily  polyethylene glycol 3350 17 Gram(s) Oral daily  senna 2 Tablet(s) Oral at bedtime  spironolactone 50 milliGRAM(s) Oral daily      PHYSICAL EXAM:  T(C): 36.8 (02-17-25 @ 08:49), Max: 37.1 (02-16-25 @ 17:01)  HR: 103 (02-17-25 @ 08:49) (103 - 111)  BP: 109/63 (02-17-25 @ 08:49) (101/66 - 112/71)  RR: 18 (02-17-25 @ 08:49) (18 - 18)  SpO2: 95% (02-17-25 @ 08:49) (93% - 95%)  Wt(kg): --  I&O's Summary    16 Feb 2025 07:01  -  17 Feb 2025 07:00  --------------------------------------------------------  IN: 1598.9 mL / OUT: 1500 mL / NET: 98.9 mL          Appearance: Awake, lying down in bed 	  HEENT:  Eyes are open   Lymphatic: No lymphadenopathy grossly   Cardiovascular: Normal    Respiratory: normal effort , clear  Gastrointestinal:  Soft, Non-tender  Skin: No rashes,  warm to touch  Psychiatry:  Mood & affect appropriate  Musculoskeletal: B/L LE Foot wounds; Wrapped in dressing; + Edema improving     02-16-25 @ 07:01  -  02-17-25 @ 07:00  --------------------------------------------------------  IN: 1598.9 mL / OUT: 1500 mL / NET: 98.9 mL                            8.4    15.13 )-----------( 383      ( 17 Feb 2025 06:18 )             25.3               02-17    150[H]  |  106  |  66[H]  ----------------------------<  154[H]  3.4[L]   |  19[L]  |  2.93[H]    Ca    9.1      17 Feb 2025 06:21  Mg     1.9     02-17                         Urinalysis Basic - ( 17 Feb 2025 06:21 )    Color: x / Appearance: x / SG: x / pH: x  Gluc: 154 mg/dL / Ketone: x  / Bili: x / Urobili: x   Blood: x / Protein: x / Nitrite: x   Leuk Esterase: x / RBC: x / WBC x   Sq Epi: x / Non Sq Epi: x / Bacteria: x

## 2025-02-17 NOTE — PROGRESS NOTE ADULT - ASSESSMENT
64y Female with history of CHF presents as a transfer for LE CO2 angiogram. Nephrology consulted for elevated Scr.    1) JAMEL: likely due to ATN in addition to CRS. Scr uptrending on 2/15, Unclear if this is due to an JAMEL or due to a decrease in volume overload. Monitor CMP daily. UA relatively bland. FeUrea low consistent with low flow state. Renal US unremarkable. Bladder scan on 2/3 negative. Avoid nephrotoxins.    2) HTN: BP low normal. Monitor off anti-hypertensive medications.    3) LE edema: Not secondary to nephrotic syndrome given subnephrotic range proteinuria. Continue with bumex gtt @ 3 mg/hour with metolazone, aldactone. Continue milrinone gtt as per Cardiology. TTE with severely decreased LVSF. Monitor UO. Recommend checking CMP twice daily while on high doses of Bumex.    4) Hyperphosphatemia: Resolved. Continue with low phosphorus diet.     5. Hypernatremia: Due to receiving 3% hypertonic saline. Cardiology is providing this to augment urinary output, but I would recommend holding this medication and continuing the other methods of management.    No objections to perform Cardiac MRI; etiology of heart failure needs to be determined. Strongly recommend using renal protective MRI contrast.    [facility] NEPHROLOGY  Raji Waterman M.D.  Mars Feliz D.O.  Kelley Parks M.D.  MD Nancy Kulkarni, MSN, ANP-C    Telephone: [phone]  Facsimile: [phone]    [facility] [place] [address], [address]  [place] [address]

## 2025-02-17 NOTE — PROGRESS NOTE ADULT - ASSESSMENT
63 YO F with PMHx of HFrEF 30-35 and grade 2 ddfxn (last TTE on 01/16/25), DM2, and diabetic foot ulcer. Recent admission at [facility] for ADHF. Patient now represents to OSH and ultimately to [facility] as a transfer for worsening right leg pain and fluid secretion. As per documentation, patient was reported to have severe depletion of RLE blood flow beyond the popliteal vessel at knee and similar findings in the left. Patient was transferred to [facility] for CO2 angiogram with Dr. Baltazar. Of note, patient also with reported visual disturbance for which patient was seen and evaluated by opthalmology. Internal Medicine has been consulted on Ms. Kirby's care for medical management.     # ADHF/ BiV HFrEF 20   - Recent admission at [facility] for ADHF and on dobutamine outpatient  - TTE 1/16 with EF 30-35 with moderately reduced LVSF and grade 2 ddfxn, normal RVSF , trace TR/ DC, and trace pericardial effusion  - NST abnormal with small-sized, moderate defect in apical wall that is predominantly fixed suggestive of an infarction with minimal marcus-infarct ischemia. Post stress LVEF 25.  - CT Chest with small BL pleural effusions and small pericardial effusion.  - RPT TTE with EF 20, severely decreased LV, decreased RVSF with TAPSE 1.2, and regional wall motion abnormalities present with entire septum, entire apex, entire inferior wall, mid inferolateral segment, and mid anterolateral segment are hypokinetic.   - RHC with RA 20, PA 49/29 (40), PCWP 35, mVO2 51.1, CO/CI 3.8/2.2, SVR 1095 w/ Multivessel CAD   - Seen by CTSx and not a candicate for CABG at this time due to comorbidities. F/u post LE angio   - Pending Cardiac MRI   - Continue on milrinone 0.5mcg GTT, bumex 3mg GTT, aldactone 50 QD   - Was on augmentation with HTS, however hypernatremic. Discussed with HF 2/16 and cleared to hold HTS with revaluation by HF in AM   - S/P zaroxolyn 10 QD   - Case discussed with EP and needs to be on GDMT for 3 months before AICD placement and should be stabilized off of inotropic support.   - Monitor I and O, diuresis per Cardio/ Renal    - GDMT as per Cardio  - Serial CXR   - Monitor volume status   - Check daily weights    - Monitor on telemetry; Monitor electrolytes   - Cardio, HF, Renal, CTS and EP evals appreciated; F/u recs     # CAD with TVD   - LHC with RCA , severe ostial LM disease, diffuse disease in LAD and LCx, some L to R collaterals --> Multivessel CAD   - AS and Statin   - CTSx evaluation appreciated; F/u recs --> Not a candidate for CABG due to comorbidities. Await LE Angio     # BL LE Edema likely in setting of ADHF  - Diuresis as per Cardio, HF and Renal   - BL LE elevation and compression  - LE Duplex neg   - Monitor for now  - Podiatry and Vascular evals appreciated; F/u recs    # Pericardial effusion likely in setting ADHF  - TTE with trace pericardial effusion   - CT Chest with small pericardial effusion   - Denies chest pain, palpitations, chest tightness or discomfort, shortness of breath or dyspnea   - Repeat TTE in 1 month for further evaluation   - Diuresis per cardio/ renal.  - Monitor on telemetry  - Cardio following    # Pleural effusion likely in setting ADHF  - CT Chest with small BL pleural effusions  - On RA with no respiratory complaints at present  - Monitor O2 saturation  - Supplement to maintain > 90%   - Diuresis per cardio/ renal.     # JAMEL with Hyponatremia and Metabolic Acidosis   - Baseline CRE 0.7-1.2 and increased to ~4  - As per OSH documentation, JAMEL was thought to be second to Unasyn/ Bumex / Entresto use and Hypotension   - US RENAL with no hydronephrosis  - CRE improving slightly with diuresis/ inotropic support and likely cardiorenal induced with low flow state in ADHF  - Continue with HF support as above  - Avoid nephrotoxic agents  - Monitor Cr and daily BMP   - 2/16 hypernatremic, discussed with HF, cleared to hold HTS and repeat BMP in AM. Avoid overcorrection > 6-8 mEq in 24 hours   - Renal eval appreciated    # Leukocytosis likely in setting of BL LE ulcers with pop occlusion   - BCx negative   - UCx with probable contamination   - On unasyn for ABX. Frequency increased to Q12 as per Renal   - Leukocytosis fluctuating, Monitor closely. If febrile check pan cultures   - Trend CBC, temp curve, VS and adjust as tolerated    # Foot ulcers with worsening RLE pain second to pop artery occlusion +/- diabetic ulcers   - RLE Arterial Duplex with popliteal artery occlusion   - LLE Arterial Duplex with underlying disease cannot be excluded   - Patient transferred to [facility] for CO2 angiogram, however given ADHF currently not medically optimized and high risk. Plan to continue on diuresis and inotropic support as above   - Was on Heparin GTT for now and now on HSQ  - On Unasyn for ABX   - Continue on Lipitor  - Monitor PLT and HH while on AC   - Vascular following - given continued pain and slightly improving volume status discussing angio for tentatively 2/19   - Continue pain control with oxy  - Wound care called for dressing recs     # Tachycardia likely pain related   - On Toprol and improved  - Continue to monitor on tele   - Cardio eval appreciated    # Visual Disturbance  - CT Head w/ old infarcts  - On ASA and Statin   - Opthalmology eval appreciated    # Indigestion and Constipation   - PPI, miralax and senna continued  - Monitor BMs    # Anemia likely mixed AOCD vs iron deficiency   - HH stable in 9s on HSQ  - Consider iron tabs when optimized   - Monitor HH   - Transfuse for Hgb < 8  - Maintain active T/S    # Diabetes Mellitus A1C 11.6   - Continue on lantus 10 with tradjecta 5 and ISS   - Diabetic DASH diet   - Monitor and adjust glucose levels PRN   - Endocrine following; F/u recs     # HLD and HLD  - Continue on lipitor and toprol  - Monitor BP    # DISPO TBD      Discussed with Attending and ACP.   Discussed in detail and at length with patient's brother Galo at the bedside. All questions answered.

## 2025-02-17 NOTE — PROGRESS NOTE ADULT - SUBJECTIVE AND OBJECTIVE BOX
[facility] NEPHROLOGY- PROGRESS NOTE    64y Female with history of CHF presents as a transfer for LE CO2 angiogram. Nephrology consulted for elevated Scr.    Still with pain in her lower extremities. Current nephrology plan of care discussed with the patient's brother at bedside.    REVIEW OF SYSTEMS:  Gen: no fevers  Cards: no chest pain  Resp: + dyspnea with exertion, + cough  GI: no nausea and vomiting, no diarrhea  Vascular: + LE edema improving.   Msk: Persistent pain upon moving legs.    No Known Allergies      Hospital Medications: Medications reviewed    VITALS:  T(F): 98.2 (02-17-25 @ 13:16), Max: 98.8 (02-16-25 @ 17:01)  HR: 100 (02-17-25 @ 13:16)  BP: 117/66 (02-17-25 @ 13:16)  RR: 18 (02-17-25 @ 13:16)  SpO2: 98% (02-17-25 @ 13:16)  Wt(kg): --    02-16 @ 07:01  -  02-17 @ 07:00  --------------------------------------------------------  IN: 1598.9 mL / OUT: 1500 mL / NET: 98.9 mL    02-17 @ 07:01  -  02-17 @ 15:30  --------------------------------------------------------  IN: 37.9 mL / OUT: 0 mL / NET: 37.9 mL            PHYSICAL EXAM:    Gen: NAD, calm  Cards: RRR, +S1/S2, no M/G/R  Resp: bibasilar rales  GI: soft, NT/ND, NABS  Vascular: 2+ RLE edema > LLE edema with legs wrapped      LABS:  02-17    Na+ trend: 150 <--, 142 <--, 139 <--, 138 <--, 141 <--, 139 <--, 139 <--    150[H]  |  106  |  66[H]  ----------------------------<  154[H]  3.4[L]   |  19[L]  |  2.93[H]    Ca    9.1      17 Feb 2025 06:21  Mg     1.9     02-17      Creatinine Trend: 2.93 <--, 2.95 <--, 2.81 <--, 2.80 <--, 2.74 <--, 2.40 <--, 2.39 <--, 2.37 <--, 2.40 <--, 2.50 <--, 2.39 <--, 2.49 <--, 2.42 <--                        8.4    15.13 )-----------( 383      ( 17 Feb 2025 06:18 )             25.3     Urine Studies:  Urinalysis Basic - ( 17 Feb 2025 06:21 )    Color:  / Appearance:  / SG:  / pH:   Gluc: 154 mg/dL / Ketone:   / Bili:  / Urobili:    Blood:  / Protein:  / Nitrite:    Leuk Esterase:  / RBC:  / WBC    Sq Epi:  / Non Sq Epi:  / Bacteria:

## 2025-02-18 LAB
ANION GAP SERPL CALC-SCNC: 23 MMOL/L — HIGH (ref 5–17)
BUN SERPL-MCNC: 76 MG/DL — HIGH (ref 7–23)
CALCIUM SERPL-MCNC: 9.6 MG/DL — SIGNIFICANT CHANGE UP (ref 8.4–10.5)
CHLORIDE SERPL-SCNC: 95 MMOL/L — LOW (ref 96–108)
CO2 SERPL-SCNC: 23 MMOL/L — SIGNIFICANT CHANGE UP (ref 22–31)
CREAT SERPL-MCNC: 3.49 MG/DL — HIGH (ref 0.5–1.3)
EGFR: 14 ML/MIN/1.73M2 — LOW
EGFR: 14 ML/MIN/1.73M2 — LOW
GLUCOSE BLDC GLUCOMTR-MCNC: 195 MG/DL — HIGH (ref 70–99)
GLUCOSE BLDC GLUCOMTR-MCNC: 200 MG/DL — HIGH (ref 70–99)
GLUCOSE BLDC GLUCOMTR-MCNC: 203 MG/DL — HIGH (ref 70–99)
GLUCOSE BLDC GLUCOMTR-MCNC: 210 MG/DL — HIGH (ref 70–99)
GLUCOSE BLDC GLUCOMTR-MCNC: 241 MG/DL — HIGH (ref 70–99)
GLUCOSE SERPL-MCNC: 192 MG/DL — HIGH (ref 70–99)
HCT VFR BLD CALC: 24.5 % — LOW (ref 34.5–45)
HGB BLD-MCNC: 8.6 G/DL — LOW (ref 11.5–15.5)
MAGNESIUM SERPL-MCNC: 2.1 MG/DL — SIGNIFICANT CHANGE UP (ref 1.6–2.6)
MCHC RBC-ENTMCNC: 25.1 PG — LOW (ref 27–34)
MCHC RBC-ENTMCNC: 35.1 G/DL — SIGNIFICANT CHANGE UP (ref 32–36)
MCV RBC AUTO: 71.6 FL — LOW (ref 80–100)
NRBC BLD AUTO-RTO: 0 /100 WBCS — SIGNIFICANT CHANGE UP (ref 0–0)
PLATELET # BLD AUTO: 420 K/UL — HIGH (ref 150–400)
POTASSIUM SERPL-MCNC: 3.4 MMOL/L — LOW (ref 3.5–5.3)
POTASSIUM SERPL-SCNC: 3.4 MMOL/L — LOW (ref 3.5–5.3)
RBC # BLD: 3.42 M/UL — LOW (ref 3.8–5.2)
RBC # FLD: 17.9 % — HIGH (ref 10.3–14.5)
SODIUM SERPL-SCNC: 141 MMOL/L — SIGNIFICANT CHANGE UP (ref 135–145)
WBC # BLD: 17.21 K/UL — HIGH (ref 3.8–10.5)
WBC # FLD AUTO: 17.21 K/UL — HIGH (ref 3.8–10.5)

## 2025-02-18 PROCEDURE — 99232 SBSQ HOSP IP/OBS MODERATE 35: CPT

## 2025-02-18 RX ORDER — INSULIN GLARGINE-YFGN 100 [IU]/ML
7 INJECTION, SOLUTION SUBCUTANEOUS AT BEDTIME
Refills: 0 | Status: DISCONTINUED | OUTPATIENT
Start: 2025-02-18 | End: 2025-02-19

## 2025-02-18 RX ORDER — MILRINONE LACTATE 1 MG/ML
0.12 INJECTION, SOLUTION INTRAVENOUS
Qty: 20 | Refills: 0 | Status: DISCONTINUED | OUTPATIENT
Start: 2025-02-18 | End: 2025-02-20

## 2025-02-18 RX ADMIN — Medication 50 MILLIGRAM(S): at 05:03

## 2025-02-18 RX ADMIN — OXYCODONE HYDROCHLORIDE 5 MILLIGRAM(S): 30 TABLET ORAL at 23:01

## 2025-02-18 RX ADMIN — Medication 40 MILLIEQUIVALENT(S): at 19:02

## 2025-02-18 RX ADMIN — AMPICILLIN SODIUM AND SULBACTAM SODIUM 200 GRAM(S): 1; .5 INJECTION, POWDER, FOR SOLUTION INTRAMUSCULAR; INTRAVENOUS at 09:59

## 2025-02-18 RX ADMIN — OXYCODONE HYDROCHLORIDE 5 MILLIGRAM(S): 30 TABLET ORAL at 08:26

## 2025-02-18 RX ADMIN — OXYCODONE HYDROCHLORIDE 5 MILLIGRAM(S): 30 TABLET ORAL at 09:26

## 2025-02-18 RX ADMIN — Medication 2 TABLET(S): at 22:01

## 2025-02-18 RX ADMIN — Medication 5 MILLIGRAM(S): at 19:02

## 2025-02-18 RX ADMIN — INSULIN GLARGINE-YFGN 7 UNIT(S): 100 INJECTION, SOLUTION SUBCUTANEOUS at 22:01

## 2025-02-18 RX ADMIN — HEPARIN SODIUM 5000 UNIT(S): 1000 INJECTION INTRAVENOUS; SUBCUTANEOUS at 05:03

## 2025-02-18 RX ADMIN — HEPARIN SODIUM 5000 UNIT(S): 1000 INJECTION INTRAVENOUS; SUBCUTANEOUS at 14:37

## 2025-02-18 RX ADMIN — LINAGLIPTIN 5 MILLIGRAM(S): 5 TABLET, FILM COATED ORAL at 12:22

## 2025-02-18 RX ADMIN — Medication 40 MILLIEQUIVALENT(S): at 14:37

## 2025-02-18 RX ADMIN — MILRINONE LACTATE 2.7 MICROGRAM(S)/KG/MIN: 1 INJECTION, SOLUTION INTRAVENOUS at 22:43

## 2025-02-18 RX ADMIN — OXYCODONE HYDROCHLORIDE 5 MILLIGRAM(S): 30 TABLET ORAL at 04:09

## 2025-02-18 RX ADMIN — Medication 40 MILLIGRAM(S): at 12:24

## 2025-02-18 RX ADMIN — MILRINONE LACTATE 2.7 MICROGRAM(S)/KG/MIN: 1 INJECTION, SOLUTION INTRAVENOUS at 11:12

## 2025-02-18 RX ADMIN — INSULIN LISPRO 1: 100 INJECTION, SOLUTION INTRAVENOUS; SUBCUTANEOUS at 19:01

## 2025-02-18 RX ADMIN — AMPICILLIN SODIUM AND SULBACTAM SODIUM 200 GRAM(S): 1; .5 INJECTION, POWDER, FOR SOLUTION INTRAMUSCULAR; INTRAVENOUS at 22:04

## 2025-02-18 RX ADMIN — Medication 5 MILLIGRAM(S): at 05:03

## 2025-02-18 RX ADMIN — ATORVASTATIN CALCIUM 40 MILLIGRAM(S): 80 TABLET, FILM COATED ORAL at 22:01

## 2025-02-18 RX ADMIN — INSULIN LISPRO 2: 100 INJECTION, SOLUTION INTRAVENOUS; SUBCUTANEOUS at 12:23

## 2025-02-18 RX ADMIN — OXYCODONE HYDROCHLORIDE 5 MILLIGRAM(S): 30 TABLET ORAL at 04:39

## 2025-02-18 RX ADMIN — HEPARIN SODIUM 5000 UNIT(S): 1000 INJECTION INTRAVENOUS; SUBCUTANEOUS at 22:01

## 2025-02-18 RX ADMIN — Medication 81 MILLIGRAM(S): at 12:22

## 2025-02-18 RX ADMIN — INSULIN LISPRO 2: 100 INJECTION, SOLUTION INTRAVENOUS; SUBCUTANEOUS at 08:26

## 2025-02-18 RX ADMIN — OXYCODONE HYDROCHLORIDE 5 MILLIGRAM(S): 30 TABLET ORAL at 22:01

## 2025-02-18 NOTE — PROGRESS NOTE ADULT - PROBLEM SELECTOR PLAN 2
-c/w ASA  -consult CT surgery for CABG evaluation vs high risk PCI vs medical management  -pending cMRI for viability

## 2025-02-18 NOTE — PROGRESS NOTE ADULT - SUBJECTIVE AND OBJECTIVE BOX
Follow-up Pulm Progress Note    No new respiratory events overnight.  Denies SOB/CP.     Medications:  MEDICATIONS  (STANDING):  ampicillin/sulbactam  IVPB 3 Gram(s) IV Intermittent every 12 hours  ampicillin/sulbactam  IVPB      aspirin enteric coated 81 milliGRAM(s) Oral daily  atorvastatin 40 milliGRAM(s) Oral at bedtime  benzocaine/menthol Lozenge 1 Lozenge Oral once  bisacodyl 5 milliGRAM(s) Oral every 12 hours  dextrose 5%. 1000 milliLiter(s) (100 mL/Hr) IV Continuous <Continuous>  dextrose 5%. 1000 milliLiter(s) (50 mL/Hr) IV Continuous <Continuous>  dextrose 50% Injectable 25 Gram(s) IV Push once  dextrose 50% Injectable 12.5 Gram(s) IV Push once  dextrose 50% Injectable 25 Gram(s) IV Push once  glucagon  Injectable 1 milliGRAM(s) IntraMuscular once  heparin   Injectable 5000 Unit(s) SubCutaneous every 8 hours  insulin glargine Injectable (LANTUS) 7 Unit(s) SubCutaneous at bedtime  insulin lispro (ADMELOG) corrective regimen sliding scale   SubCutaneous three times a day before meals  insulin lispro (ADMELOG) corrective regimen sliding scale   SubCutaneous at bedtime  linagliptin 5 milliGRAM(s) Oral daily  milrinone Infusion 0.125 MICROgram(s)/kG/Min (2.7 mL/Hr) IV Continuous <Continuous>  pantoprazole  Injectable 40 milliGRAM(s) IV Push daily  polyethylene glycol 3350 17 Gram(s) Oral daily  potassium chloride   Powder 40 milliEquivalent(s) Oral once  senna 2 Tablet(s) Oral at bedtime  spironolactone 50 milliGRAM(s) Oral daily    MEDICATIONS  (PRN):  acetaminophen     Tablet .. 650 milliGRAM(s) Oral every 6 hours PRN Mild Pain (1 - 3)  dextrose Oral Gel 15 Gram(s) Oral once PRN Blood Glucose LESS THAN 70 milliGRAM(s)/deciliter  melatonin 3 milliGRAM(s) Oral at bedtime PRN Insomnia  ondansetron Injectable 4 milliGRAM(s) IV Push every 6 hours PRN Nausea and/or Vomiting  oxyCODONE    IR 5 milliGRAM(s) Oral every 4 hours PRN Severe Pain (7 - 10)  sodium chloride 0.65% Nasal 1 Spray(s) Both Nostrils three times a day PRN Dryness          Vital Signs Last 24 Hrs  T(C): 36.8 (18 Feb 2025 09:06), Max: 36.8 (17 Feb 2025 13:16)  T(F): 98.2 (18 Feb 2025 09:06), Max: 98.3 (17 Feb 2025 21:13)  HR: 99 (18 Feb 2025 09:06) (99 - 106)  BP: 110/66 (18 Feb 2025 09:06) (94/57 - 117/66)  BP(mean): --  RR: 18 (18 Feb 2025 09:06) (17 - 18)  SpO2: 94% (18 Feb 2025 09:06) (91% - 98%)    Parameters below as of 18 Feb 2025 09:06  Patient On (Oxygen Delivery Method): room air              02-17 @ 07:01  -  02-18 @ 07:00  --------------------------------------------------------  IN: 1224.4 mL / OUT: 1425 mL / NET: -200.6 mL          LABS:                        8.6    17.21 )-----------( 420      ( 18 Feb 2025 07:16 )             24.5     02-18    141  |  95[L]  |  76[H]  ----------------------------<  192[H]  3.4[L]   |  23  |  3.49[H]    Ca    9.6      18 Feb 2025 07:17  Mg     2.1     02-18            CAPILLARY BLOOD GLUCOSE      POCT Blood Glucose.: 210 mg/dL (18 Feb 2025 08:07)      Urinalysis Basic - ( 18 Feb 2025 07:17 )    Color: x / Appearance: x / SG: x / pH: x  Gluc: 192 mg/dL / Ketone: x  / Bili: x / Urobili: x   Blood: x / Protein: x / Nitrite: x   Leuk Esterase: x / RBC: x / WBC x   Sq Epi: x / Non Sq Epi: x / Bacteria: x            Physical Examination:  PULM: coarse bilaterally   CVS: S1, S2 heard    RADIOLOGY REVIEWED  CXR: congestion   bibasilar atelectasis/effusions     CT chest:< from: CT Chest No Cont (01.25.25 @ 14:08) >  ACC: [id number] EXAM:  CT CHEST   ORDERED BY: TED MONROY     PROCEDURE DATE:  01/25/2025        INTERPRETATION:  Clinical information: Shortness of breath.    CT scan of the chest was obtained without administration of intravenous   contrast.    Several lymph nodes are present in the mediastinum.    Heart is enlarged in size. Calcification of the coronary arteries is   noted. Small pericardial effusion is noted.    No endobronchial lesions are noted. Patchy groundglass opacities noted   within both lungs are felt to be secondary to expiratory   technique/breathing artifact. Atelectasis is noted involving portions of   both lower lobes. This is secondary to small bilateral pleural effusions.    Below the diaphragm, visualized portions of the abdomen demonstrate   patient to be status post cholecystectomy.    Degenerative changes of the spine are noted.    IMPRESSION: Small bilateral pleural effusions and small pericardial   effusion.    --- End of Report ---    < end of copied text >      TTE:  < from: TTE W or WO Ultrasound Enhancing Agent (02.10.25 @ 12:09) >    TRANSTHORACIC ECHOCARDIOGRAM REPORT  ________________________________________________________________________________                                      _______       Pt. Name:       TOMASZ ALBA Study Date:    2/10/2025  MRN:            [id number]      YOB: 1960  Accession #:    [id number]       Age:           64 years  Account#:       [id number]    Gender:        F  Heart Rate:                     Height:        63.00 in (160.02 cm)  Rhythm:                         Weight:       147.00 lb (66.68 kg)  Blood Pressure: 112/56 mmHg     BSA/BMI:       1.70 m² / 26.04 kg/m²  ________________________________________________________________________________________  Referring Physician:       [id number] Mario Lu  Interpreting Physician:    Reg Glaser MD  Primary Sonographer:       Kassie Bergeron [facility]  Fellow (Performing):       Abbe Pickard MD  Fellow (Interpreting):     Abbe Pickard MD  Fellow (2nd Interpreting): Josefina Yo MD    CPT:                ECHO TTE W CON FU LTD - .m;DEFINITY ECHO CONTRAST PER                      ML WASTED - .m;DEFINITY ECHO CONTRAST PER ML - .m  Indication(s):      Abnormal electrocardiogram ECG EKG - R94.31  Procedure:          Limited transthoracic echocardiogram.  Ordering Location:  [facility]  Admission Status:   Inpatient  Contrast Injection: Verbal consent was obtained for injection of Ultrasonic                      Enhancing Agent following a discussion of risks and                      benefits.   Endocardial visualization enhanced with 2 ml of Definity                      Ultrasound enhancing agent (Lot#:[id number] Exp.Date:08/2025                      Discarded Dose:8ml).  UEA Reaction:       Patient had no adverse reaction after injection of                    Ultrasound Enhancing Agent.  Study Information:  Image quality for this study is fair.       CONCLUSIONS:      1. Left ventricular cavity is mildly dilated. Left ventricular systolic function is severely decreased with an ejection fraction of 20 % by Winchester's method of disks. Regional wall motion abnormalities present.   2. Multiple segmental abnormalities exist. See findings.   3. Reduced right ventricular systolic function.   4. No pericardial effusion seen.   5. Compared to the transthoracic echocardiogram performed on 1/16/2025, Worsening of the left ventricular function.    ________________________________________________________________________________________  FINDINGS:     Left Ventricle:  The left ventricular cavity is mildly dilated. Left ventricular systolic function is severely decreased with a calculated ejection fraction of 20 % by the Winchester's biplane method of disks. There are regional wall motion abnormalities present.  LV Wall Scoring: The entire septum, entire apex, entire inferior wall, mid  inferolateral segment, and mid anterolateral segment are hypokinetic. All  remaining scored segments are normal.          Right Ventricle:  The right ventricular cavity is normal in size and right ventricular systolic function is reduced. Tricuspid annular plane systolic excursion (TAPSE) is 1.2 cm (normal >=1.7 cm).     Pericardium:  No pericardial effusion seen.  ____________________________________________________________________  QUANTITATIVE DATA:  Left Ventricle Measurements: (Indexed to BSA)     BiPlane LV EF%: 20 %             Right Ventricle Measurements:     TAPSE: 1.2 cm       LVOT / RVOT/ Qp/Qs Data: (Indexed to BSA)  LVOT Vmax:      0.70 m/s  LVOT Vmn:       0.455 m/s  LVOT VTI:       11.00 cm  LVOT peak grad: 2 mmHg  LVOT mean grad: 1.0 mmHg    < end of copied text >

## 2025-02-18 NOTE — PROGRESS NOTE ADULT - PROBLEM SELECTOR PLAN 1
- wean milrinone to 0.125  -diuretic holiday given rising creatinine  -continue meron 50mg  -iron sucrose 200mg IV q5 days  -replete K>4, Mg>2  -only bed weights documented, please obtain true standing weights - wean milrinone to 0.125, plan to turn off tomorrow  -diuretic holiday given rising creatinine  -pause meron in setting of JAMEL  -s/p iron sucrose x5 days  -replete K>4, Mg>2 - wean milrinone to 0.125, plan to turn off tomorrow  -diuretic holiday given rising creatinine; potentially intravascularly depleted?  -pause meron in setting of JAMEL  -s/p iron sucrose x5 days  -replete K>4, Mg>2

## 2025-02-18 NOTE — PROGRESS NOTE ADULT - SUBJECTIVE AND OBJECTIVE BOX
INTERNAL MEDICINE PROGRESS NOTE     NAME OF PATIENT: TOMASZ ALBA  MRN: [id number]  DATE OF VISIT: 02-18-25 @ 14:32    SUBJECTIVE/ ROS:  - Patient seen and examined by bedside   - RLE angio tomorrow   - Milrinone down to 0.125  - Diuresis holding given rising CRE   - WBC rising but no fever spikes, continued on unasyn, and will hold reculture for now     OBJECTIVE:  ICU Vital Signs Last 24 Hrs  T(C): 36.8 (18 Feb 2025 09:06), Max: 36.8 (17 Feb 2025 21:13)  T(F): 98.2 (18 Feb 2025 09:06), Max: 98.3 (17 Feb 2025 21:13)  HR: 99 (18 Feb 2025 09:06) (99 - 106)  BP: 110/66 (18 Feb 2025 09:06) (94/57 - 112/60)  BP(mean): --  ABP: --  ABP(mean): --  RR: 18 (18 Feb 2025 09:06) (17 - 18)  SpO2: 94% (18 Feb 2025 09:06) (91% - 98%)    O2 Parameters below as of 18 Feb 2025 09:06  Patient On (Oxygen Delivery Method): room air          02-18-25 @ 14:32  T(C): 36.8 (02-18-25 @ 09:06), Max: 36.8 (02-17-25 @ 21:13)  HR: 99 (02-18-25 @ 09:06) (99 - 106)  BP: 110/66 (02-18-25 @ 09:06) (94/57 - 112/60)  RR: 18 (02-18-25 @ 09:06) (17 - 18)  SpO2: 94% (02-18-25 @ 09:06) (91% - 98%)  Wt(kg): --    CAPILLARY BLOOD GLUCOSE  POCT Blood Glucose.: 241 mg/dL (18 Feb 2025 12:06)      HOSPITAL MEDICATIONS:  MEDICATIONS  (STANDING):  ampicillin/sulbactam  IVPB 3 Gram(s) IV Intermittent every 12 hours  ampicillin/sulbactam  IVPB      aspirin enteric coated 81 milliGRAM(s) Oral daily  atorvastatin 40 milliGRAM(s) Oral at bedtime  benzocaine/menthol Lozenge 1 Lozenge Oral once  bisacodyl 5 milliGRAM(s) Oral every 12 hours  dextrose 5%. 1000 milliLiter(s) (100 mL/Hr) IV Continuous <Continuous>  dextrose 5%. 1000 milliLiter(s) (50 mL/Hr) IV Continuous <Continuous>  dextrose 50% Injectable 25 Gram(s) IV Push once  dextrose 50% Injectable 12.5 Gram(s) IV Push once  dextrose 50% Injectable 25 Gram(s) IV Push once  glucagon  Injectable 1 milliGRAM(s) IntraMuscular once  heparin   Injectable 5000 Unit(s) SubCutaneous every 8 hours  insulin glargine Injectable (LANTUS) 7 Unit(s) SubCutaneous at bedtime  insulin lispro (ADMELOG) corrective regimen sliding scale   SubCutaneous three times a day before meals  insulin lispro (ADMELOG) corrective regimen sliding scale   SubCutaneous at bedtime  linagliptin 5 milliGRAM(s) Oral daily  milrinone Infusion 0.125 MICROgram(s)/kG/Min (2.7 mL/Hr) IV Continuous <Continuous>  pantoprazole  Injectable 40 milliGRAM(s) IV Push daily  polyethylene glycol 3350 17 Gram(s) Oral daily  potassium chloride   Powder 40 milliEquivalent(s) Oral once  senna 2 Tablet(s) Oral at bedtime    MEDICATIONS  (PRN):  acetaminophen     Tablet .. 650 milliGRAM(s) Oral every 6 hours PRN Mild Pain (1 - 3)  dextrose Oral Gel 15 Gram(s) Oral once PRN Blood Glucose LESS THAN 70 milliGRAM(s)/deciliter  melatonin 3 milliGRAM(s) Oral at bedtime PRN Insomnia  ondansetron Injectable 4 milliGRAM(s) IV Push every 6 hours PRN Nausea and/or Vomiting  oxyCODONE    IR 5 milliGRAM(s) Oral every 4 hours PRN Severe Pain (7 - 10)  sodium chloride 0.65% Nasal 1 Spray(s) Both Nostrils three times a day PRN Dryness      PHYSICAL EXAMINATION:  General: NAD   Cardiology: S1/S2 with no murmur   Respiratory: CTA BL with no wheeze   GI: Soft and NTND  Extremities: JOSE L of BL UE/LE. RLE limited second to pain.   Neurology: Awake with no acute neurological deficits     LABS:                        8.6    17.21 )-----------( 420      ( 18 Feb 2025 07:16 )             24.5     02-18    141  |  95[L]  |  76[H]  ----------------------------<  192[H]  3.4[L]   |  23  |  3.49[H]    Ca    9.6      18 Feb 2025 07:17  Mg     2.1     02-18            MICROBIOLOGY:     RADIOLOGY:    CARDIOLOGY:           INTERNAL MEDICINE PROGRESS NOTE     NAME OF PATIENT: TOMASZ ALBA  MRN: [id number]  DATE OF VISIT: 02-18-25 @ 14:32    SUBJECTIVE/ ROS:  - Patient seen and examined by bedside   - RLE CO2 angio tomorrow   - Milrinone down to 0.125  - Diuresis holding given rising CRE   - WBC rising but no fever spikes, continued on unasyn, and will hold reculture for now     OBJECTIVE:  ICU Vital Signs Last 24 Hrs  T(C): 36.8 (18 Feb 2025 09:06), Max: 36.8 (17 Feb 2025 21:13)  T(F): 98.2 (18 Feb 2025 09:06), Max: 98.3 (17 Feb 2025 21:13)  HR: 99 (18 Feb 2025 09:06) (99 - 106)  BP: 110/66 (18 Feb 2025 09:06) (94/57 - 112/60)  BP(mean): --  ABP: --  ABP(mean): --  RR: 18 (18 Feb 2025 09:06) (17 - 18)  SpO2: 94% (18 Feb 2025 09:06) (91% - 98%)    O2 Parameters below as of 18 Feb 2025 09:06  Patient On (Oxygen Delivery Method): room air          02-18-25 @ 14:32  T(C): 36.8 (02-18-25 @ 09:06), Max: 36.8 (02-17-25 @ 21:13)  HR: 99 (02-18-25 @ 09:06) (99 - 106)  BP: 110/66 (02-18-25 @ 09:06) (94/57 - 112/60)  RR: 18 (02-18-25 @ 09:06) (17 - 18)  SpO2: 94% (02-18-25 @ 09:06) (91% - 98%)  Wt(kg): --    CAPILLARY BLOOD GLUCOSE  POCT Blood Glucose.: 241 mg/dL (18 Feb 2025 12:06)      HOSPITAL MEDICATIONS:  MEDICATIONS  (STANDING):  ampicillin/sulbactam  IVPB 3 Gram(s) IV Intermittent every 12 hours  ampicillin/sulbactam  IVPB      aspirin enteric coated 81 milliGRAM(s) Oral daily  atorvastatin 40 milliGRAM(s) Oral at bedtime  benzocaine/menthol Lozenge 1 Lozenge Oral once  bisacodyl 5 milliGRAM(s) Oral every 12 hours  dextrose 5%. 1000 milliLiter(s) (100 mL/Hr) IV Continuous <Continuous>  dextrose 5%. 1000 milliLiter(s) (50 mL/Hr) IV Continuous <Continuous>  dextrose 50% Injectable 25 Gram(s) IV Push once  dextrose 50% Injectable 12.5 Gram(s) IV Push once  dextrose 50% Injectable 25 Gram(s) IV Push once  glucagon  Injectable 1 milliGRAM(s) IntraMuscular once  heparin   Injectable 5000 Unit(s) SubCutaneous every 8 hours  insulin glargine Injectable (LANTUS) 7 Unit(s) SubCutaneous at bedtime  insulin lispro (ADMELOG) corrective regimen sliding scale   SubCutaneous three times a day before meals  insulin lispro (ADMELOG) corrective regimen sliding scale   SubCutaneous at bedtime  linagliptin 5 milliGRAM(s) Oral daily  milrinone Infusion 0.125 MICROgram(s)/kG/Min (2.7 mL/Hr) IV Continuous <Continuous>  pantoprazole  Injectable 40 milliGRAM(s) IV Push daily  polyethylene glycol 3350 17 Gram(s) Oral daily  potassium chloride   Powder 40 milliEquivalent(s) Oral once  senna 2 Tablet(s) Oral at bedtime    MEDICATIONS  (PRN):  acetaminophen     Tablet .. 650 milliGRAM(s) Oral every 6 hours PRN Mild Pain (1 - 3)  dextrose Oral Gel 15 Gram(s) Oral once PRN Blood Glucose LESS THAN 70 milliGRAM(s)/deciliter  melatonin 3 milliGRAM(s) Oral at bedtime PRN Insomnia  ondansetron Injectable 4 milliGRAM(s) IV Push every 6 hours PRN Nausea and/or Vomiting  oxyCODONE    IR 5 milliGRAM(s) Oral every 4 hours PRN Severe Pain (7 - 10)  sodium chloride 0.65% Nasal 1 Spray(s) Both Nostrils three times a day PRN Dryness      PHYSICAL EXAMINATION:  General: NAD   Cardiology: S1/S2 with no murmur   Respiratory: CTA BL with no wheeze   GI: Soft and NTND  Extremities: JOSE L of BL UE/LE. RLE limited second to pain.   Neurology: Awake with no acute neurological deficits     LABS:                        8.6    17.21 )-----------( 420      ( 18 Feb 2025 07:16 )             24.5     02-18    141  |  95[L]  |  76[H]  ----------------------------<  192[H]  3.4[L]   |  23  |  3.49[H]    Ca    9.6      18 Feb 2025 07:17  Mg     2.1     02-18            MICROBIOLOGY:     RADIOLOGY:    CARDIOLOGY:           No adenopathy or splenomegaly. No cervical or inguinal lymphadenopathy.

## 2025-02-18 NOTE — PROGRESS NOTE ADULT - ASSESSMENT
64F, hx of CHF (last TTE on 1/16/25 EF 30-35%, G2DD), DM, diabetic foot ulcer with a recent admission at [facility] for CHF exacerbation 1/14-1/17 p/w worsening R. leg pain and fluid secretion, was meeting sepsis criteria with podiatry having low suspicion for right cellulitis and admitted for continued management of HF exacerbation and needing ischemic eval.     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  -Left leg Arterial Duplex: Slightly tardus parvus waveform of the left popliteal artery is noted, for which underlying disease cannot be excluded. Posterior tibial artery waveform is nonpulsatile. Anterior tibial artery tardus parvus flow is noted.   Transferred to [facility] for CO2 angiogram as per vascular surgery    #  PAD Wound of lower extremity.   ·  Plan: Podiatry following, b/l serous bullae, no concerns for infection  Found to have RLE coolness  -Right Leg Arterial Duplex: Popliteal artery is occluded with negligible flow of right trifurcation arteries.  -Left leg Arterial Duplex: Slightly tardus parvus waveform of the left popliteal artery is noted, for which underlying disease cannot be excluded. Posterior tibial artery waveform is nonpulsatile. Anterior tibial artery tardus parvus flow is noted.  -Started on heparin gtt and dobutamine gtt -Will transfer to [facility] for CO2 angiogram as per vascular surgery as not candidate for CTA due to worsening SCr  -Keep compression dressing  -LE elevation above heart level at rest.  -Transferred to [facility] for CO2 angiogram   - Overall this patient is at   intermediate  risk (for cardiac death, nonfatal myocardial infarction, and nonfatal cardiac arrest perioperatively for this intermediate  risk procedure).   No cardiac contraindications for CO2 vangiogram  There  are  no further recommendation for risk stratifying imaging/stress testing prior to planned surgery  Seen by Podiatry-No acute pod intervention, local wound care only- Pod stable for discharge   PAD with rest ischemia plan for Angiogram Wednesday-Dr Louis        # HFrEF (congestive heart failure). Ischemic Cardiomyopathy  ·  Plan: Hx of HF, previous admission in January, on home Lasix 40 qD, Metoprolol Succ 25 qD. Not on Entresto due to insurance issues  Last TTE: LVSF moderately decreased w/ EF 30-35 %. Moderate G2DD. Mild MR  Stress test: small-sized, moderate defect(s) in the apical wall that is predominantly fixed suggestive of an infarction with minimal marcus-infarct ischemia  Ischemic cardiomyopathy  GDMT on hold 2/2 JAMEL    Repeat TTE EF 20% with WMA  Likely Ischemic cardiomyopathy despite NST revealing fixed defectLHC confirming  Multivessel CAD    LHC-RCA , severe ostial LM disease, diffuse disease in LAD and LCx, some L to R collaterals-seen by CTS advise cMR for viability poor target vessels for CABG-?High risk LM/LAD PCI    RHC: RA 20, PA 49/29 (40), PCWP 35, mVO2 51.1, CO/CI 3.8/2.2, SVR 1095    Started on Milrinone to assist augmented diuresis in attempt to avoid further renal failure  Has lost Weight 170---> 164 Kg---> 161.1 Kg  Creatinine gradually rising  Consider holding diuretics for 12-24 hrs        LE EDEMA no DVT      # JAMEL  Creatinine Trend: 2.93<--2.95 <--2.81<--, 2.80<--, 2.74<--2.40<-- 2.37<--2.50<-- 2.98<--, 3.31<--, 2.65<--, 3.90<-- 1.17  Renal function worsening

## 2025-02-18 NOTE — PROGRESS NOTE ADULT - ASSESSMENT
63 y/o F with PMH of CHF (last TTE 1/2025 with EF 30-35%), DM, diabetic foot ulcer, PAD, CAD. Recent admission at [facility] for CHF exacerbation, presented to [facility] as a transfer for worsening R leg pain. Hospital course c/b acute on chronic CHF - TTE with EF 20%, severely decreased LV and RV function, multiple regional motion abnormalities, s/p LHC revealing TVD, pleural/pericardial effusions, JAMEL, leukocytosis suspected to be in the setting of LE wound on IV ABX, persistent RLE pain w/ RLE Arterial Duplex revealing popliteal artery occlusion  Plan for eventual angiogram with vascular when medically optimized. Pulmonary called to consult as pt with c/o SOB.

## 2025-02-18 NOTE — PROGRESS NOTE ADULT - ASSESSMENT
64F, hx of HFrEF (previously 30-35%, now 20%), never had LHC, DM, diabetic foot ulcer, severe PAD b/l, presenting initially for vascular angiogram, found to be in ADHF with volume overload. No lactate or other signs of poor perfusion other than JAMEL which initially improved.     RHC: RA 20, PA 49/29 (40), PCWP 35, mVO2 51.1, CO/CI 3.8/2.2, SVR 1095  LHC: RCA , severe ostial LM disease, diffuse disease in LAD and LCx, some L to R collaterals    Cardiac testing:  TTE 2/10/25: EF 20%, reduced RVSF  TTE 1/16/25: EF 30-35%, grade 2 DD  NST 1/24/25: small moderate defect in apical wall that is predominantly fixed    Home cardiac meds: toprol 25

## 2025-02-18 NOTE — PROGRESS NOTE ADULT - PROBLEM SELECTOR PLAN 1
-Primarily with exertion & when laying flat. Pt denies SOB at rest.  -Suspect 2nd to fluid overload, underlying CHF  -Keep O>I as tolerated  -VBG 2/14 acceptable  -ABX per primary team for LE wounds  -Keep sats >90% with O2 PRN (currently RA).  -Pt states SOB improving at this time

## 2025-02-18 NOTE — PROGRESS NOTE ADULT - ASSESSMENT
64y Female with history of CHF presents as a transfer for LE CO2 angiogram. Nephrology consulted for elevated Scr.    1) JAMEL: likely due to ATN in addition to CRS. Scr continuing to increase, but unclear if this is due to an JAMEL or due to a decrease in volume overload. Monitor CMP daily. UA relatively bland. FeUrea low consistent with low flow state. Renal US unremarkable. Bladder scan on 2/3 negative. Avoid nephrotoxins. Need accurate measurements of intake and urinary output. Agree with pursuing Cardiac MRI and holding diuretics while awaiting this procedure.    2) HTN: BP low normal. Monitor off anti-hypertensive medications.    3) LE edema: Not secondary to nephrotic syndrome given subnephrotic range proteinuria. Hold diuretics as noted above until Cardiac MRI can be performed. Continue milrinone gtt as per Cardiology. TTE with severely decreased LVSF. Monitor UO. Recommend checking CMP twice daily while on high doses of Bumex.    4) Hyperphosphatemia: Resolved. Continue with low phosphorus diet.     5. Hypernatremia: Due to receiving 3% hypertonic saline, has since resolved after holding on 2/17. Cardiology is providing this to augment urinary output, but I would deferring on this and using multiple diuretics at different areas of the [facility] [place] (after Cardiac MRI occurs, resume diuretic therapy).    No objections to perform Cardiac MRI; etiology of heart failure needs to be determined. Strongly recommend using renal protective MRI contrast.    [facility] NEPHROLOGY  Raji Waterman M.D.  Mars Feliz D.O.  Kelley Parks M.D.  MD Nancy Kulkarni, MSN, ANP-C    Telephone: [phone]  Facsimile: [phone]    [facility] [place] [address], [address]  [place] [address]

## 2025-02-18 NOTE — PROGRESS NOTE ADULT - ASSESSMENT
65 YO F with PMHx of HFrEF 30-35 and grade 2 ddfxn (last TTE on 01/16/25), DM2, and diabetic foot ulcer. Recent admission at [facility] for ADHF. Patient now represents to OSH and ultimately to [facility] as a transfer for worsening right leg pain and fluid secretion. As per documentation, patient was reported to have severe depletion of RLE blood flow beyond the popliteal vessel at knee and similar findings in the left. Patient was transferred to [facility] for CO2 angiogram with Dr. Baltazar. Of note, patient also with reported visual disturbance for which patient was seen and evaluated by opthalmology. Internal Medicine has been consulted on Ms. Kirby's care for medical management.     # ADHF/ BiV HFrEF 20   - Recent admission at [facility] for ADHF and on dobutamine outpatient  - TTE 1/16 with EF 30-35 with moderately reduced LVSF and grade 2 ddfxn, normal RVSF , trace TR/ VA, and trace pericardial effusion  - NST abnormal with small-sized, moderate defect in apical wall that is predominantly fixed suggestive of an infarction with minimal marcus-infarct ischemia. Post stress LVEF 25.  - CT Chest with small BL pleural effusions and small pericardial effusion.  - RPT TTE with EF 20, severely decreased LV, decreased RVSF with TAPSE 1.2, and regional wall motion abnormalities present with entire septum, entire apex, entire inferior wall, mid inferolateral segment, and mid anterolateral segment are hypokinetic.   - RHC with RA 20, PA 49/29 (40), PCWP 35, mVO2 51.1, CO/CI 3.8/2.2, SVR 1095 w/ Multivessel CAD   - s/p zaroxyln 10 QD  - Continue on milrinone 0.125mcg GTT (decreased 2/18)  - CRE rising and bumex GTT stopped 2/18  - Sodium rising and augmentation with HTS stopped 2/16   - Continue on aldactone 50 QD   - HF following and adjusting medications   - Case discussed with EP and needs to be on GDMT for 3 months before AICD placement and should be stabilized off of inotropic support.   - Monitor I and O, diuresis per Cardio/ Renal    - GDMT as per Cardio  - Serial CXR   - Monitor volume status   - Check daily weights    - Monitor on telemetry; Monitor electrolytes   - Cardio, HF, Renal, and EP evals appreciated; F/u recs     # CAD with TVD   - C with RCA , severe ostial LM disease, diffuse disease in LAD and LCx, some L to R collaterals --> Multivessel CAD   - Case discussed with CTSx and NOT a candidate for CABG due to comorbidities  - Pending cardiac MRI for possible viability   - AS and Statin   - Cardiology following     # BL LE Edema likely in setting of ADHF  - Diuresis as per Cardio, HF and Renal   - BL LE elevation and compression  - LE Duplex neg   - Monitor for now  - Podiatry and Vascular evals appreciated; F/u recs    # Pericardial effusion likely in setting ADHF  - TTE with trace pericardial effusion   - CT Chest with small pericardial effusion   - Denies chest pain, palpitations, chest tightness or discomfort, shortness of breath or dyspnea   - Repeat TTE in 1 month for further evaluation   - Diuresis per cardio/ renal.  - Monitor on telemetry  - Cardio following    # Pleural effusion likely in setting ADHF  - CT Chest with small BL pleural effusions  - On RA with no respiratory complaints at present  - Monitor O2 saturation  - Supplement to maintain > 90%   - Diuresis per cardio/ renal.     # JAMEL with Hyponatremia and Metabolic Acidosis   - Baseline CRE 0.7-1.2 and increased to ~4  - As per OSH documentation, JAMEL was thought to be second to Unasyn/ Bumex / Entresto use and Hypotension   - US RENAL with no hydronephrosis  - CRE improved slightly with diuresis/ inotropic support and likely cardiorenal induced with low flow state in ADHF, however now rising again and concern for milrinone vs overdiuresis. Milrinone adjusted as above and diuresis holiday per HF . HF following and adjusting medications.   - Continue with HF support as above  - Avoid nephrotoxic agents  - Monitor Cr and daily BMP   - Renal eval appreciated    # Hypernatremia   - Hypernatremia likely from HTS vs over diuresis/ intravascular dehydraiton   - Hold further HTS   - Sodium improved   - Monitor sodium     # Leukocytosis likely in setting of BL LE ulcers with pop occlusion   - BCx negative   - UCx with probable contamination   - On unasyn for ABX. Frequency increased to Q12 as per Renal   - Leukocytosis fluctuating, Monitor closely. If febrile check pan cultures   - Trend CBC, temp curve, VS and adjust as tolerated    # Foot ulcers with worsening RLE pain second to pop artery occlusion +/- diabetic ulcers   - RLE Arterial Duplex with popliteal artery occlusion   - LLE Arterial Duplex with underlying disease cannot be excluded   - Patient transferred to [facility] for CO2 angiogram, however given ADHF currently not medically optimized and high risk. Plan to continue on diuresis and inotropic support as above   - Was on Heparin GTT for now and now on HSQ  - On Unasyn for ABX   - Continue on Lipitor  - Monitor PLT and HH while on AC   - Vascular following and given continued pain and plan for angio 2/19   - Continue pain control with oxy  - Wound care called for dressing recs     # Tachycardia likely pain related   - On Toprol and improved  - Continue to monitor on tele   - Cardio eval appreciated    # Visual Disturbance  - CT Head w/ old infarcts  - On ASA and Statin   - Opthalmology eval appreciated    # Indigestion and Constipation   - PPI, miralax and senna continued  - Monitor BMs    # Anemia likely mixed AOCD vs iron deficiency   - HH stable in 9s on HSQ  - Consider iron tabs when optimized   - Monitor HH   - Transfuse for Hgb < 8  - Maintain active T/S    # Diabetes Mellitus A1C 11.6   - Continue on lantus 10 with tradjecta 5 and ISS   - Diabetic DASH diet   - Monitor and adjust glucose levels PRN   - Endocrine following; F/u recs     # HLD and HLD  - Continue on lipitor and toprol  - Monitor BP    # DISPO TBD      Discussed with Attending             65 YO F with PMHx of HFrEF 30-35 and grade 2 ddfxn (last TTE on 01/16/25), DM2, and diabetic foot ulcer. Recent admission at [facility] for ADHF. Patient now represents to OSH and ultimately to [facility] as a transfer for worsening right leg pain and fluid secretion. As per documentation, patient was reported to have severe depletion of RLE blood flow beyond the popliteal vessel at knee and similar findings in the left. Patient was transferred to [facility] for CO2 angiogram with Dr. Baltazar. Of note, patient also with reported visual disturbance for which patient was seen and evaluated by opthalmology. Internal Medicine has been consulted on Ms. Kirby's care for medical management.     # ADHF/ BiV HFrEF 20   - Recent admission at [facility] for ADHF and on dobutamine outpatient  - TTE 1/16 with EF 30-35 with moderately reduced LVSF and grade 2 ddfxn, normal RVSF , trace TR/ OK, and trace pericardial effusion  - NST abnormal with small-sized, moderate defect in apical wall that is predominantly fixed suggestive of an infarction with minimal marcus-infarct ischemia. Post stress LVEF 25.  - CT Chest with small BL pleural effusions and small pericardial effusion.  - RPT TTE with EF 20, severely decreased LV, decreased RVSF with TAPSE 1.2, and regional wall motion abnormalities present with entire septum, entire apex, entire inferior wall, mid inferolateral segment, and mid anterolateral segment are hypokinetic.   - RHC with RA 20, PA 49/29 (40), PCWP 35, mVO2 51.1, CO/CI 3.8/2.2, SVR 1095 w/ Multivessel CAD   - s/p zaroxyln 10 QD  - Continue on milrinone 0.125mcg GTT (decreased 2/18)  - CRE rising and bumex GTT stopped 2/18  - Sodium rising and augmentation with HTS stopped 2/16   - Continue on aldactone 50 QD   - HF following and adjusting medications   - Case discussed with EP and needs to be on GDMT for 3 months before AICD placement and should be stabilized off of inotropic support.   - Monitor I and O, diuresis per Cardio/ Renal    - GDMT as per Cardio  - Serial CXR   - Monitor volume status   - Check daily weights    - Monitor on telemetry; Monitor electrolytes   - Cardio, HF, Renal, and EP evals appreciated; F/u recs     # CAD with TVD   - C with RCA , severe ostial LM disease, diffuse disease in LAD and LCx, some L to R collaterals (Multivessel CAD)  - Case discussed with CTSx and NOT a candidate for CABG due to comorbidities  - Pending cardiac MRI for possible viability  - AS and Statin   - Cardiology following     # BL LE Edema likely in setting of ADHF  - Diuresis as per Cardio, HF and Renal   - BL LE elevation and compression  - LE Duplex neg   - Monitor for now  - Podiatry and Vascular evals appreciated; F/u recs    # Pericardial effusion likely in setting ADHF  - TTE with trace pericardial effusion   - CT Chest with small pericardial effusion   - Denies chest pain, palpitations, chest tightness or discomfort, shortness of breath or dyspnea   - Repeat TTE in 1 month for further evaluation   - Diuresis per cardio/ renal.  - Monitor on telemetry  - Cardio following    # Pleural effusion likely in setting ADHF  - CT Chest with small BL pleural effusions  - On RA with no respiratory complaints at present  - Monitor O2 saturation  - Supplement to maintain > 90%   - Diuresis per cardio/ renal.     # JAMEL with Hyponatremia and Metabolic Acidosis   - Baseline CRE 0.7-1.2 and increased to ~4  - As per OSH documentation, JAMEL was thought to be second to Unasyn/ Bumex / Entresto use and Hypotension   - US RENAL with no hydronephrosis  - CRE improved slightly with diuresis/ inotropic support and likely cardiorenal induced with low flow state in ADHF, however now rising again and concern for milrinone vs overdiuresis. Milrinone adjusted as above and diuresis holiday per HF . HF following and adjusting medications.   - Continue with HF support as above  - Avoid nephrotoxic agents  - Monitor Cr and daily BMP   - Renal eval appreciated    # Hypernatremia   - Hypernatremia likely from HTS vs over diuresis/ intravascular dehydraiton   - Hold further HTS   - Sodium improved   - Monitor sodium     # Leukocytosis likely in setting of BL LE ulcers with pop occlusion   - BCx negative   - UCx with probable contamination   - On unasyn for ABX. Frequency increased to Q12 as per Renal   - Leukocytosis fluctuating, Monitor closely. If febrile check pan cultures   - Trend CBC, temp curve, VS and adjust as tolerated    # Foot ulcers with worsening RLE pain second to pop artery occlusion +/- diabetic ulcers   - RLE Arterial Duplex with popliteal artery occlusion   - LLE Arterial Duplex with underlying disease cannot be excluded   - Patient transferred to [facility] for CO2 angiogram, however given ADHF currently not medically optimized and high risk. Plan to continue on diuresis and inotropic support as above   - Was on Heparin GTT for now and now on HSQ  - On Unasyn for ABX   - Continue on Lipitor  - Monitor PLT and HH while on AC   - Vascular following and given continued pain and plan for CO2 angio 2/19   - Continue pain control with oxy  - Wound care called for dressing recs     # Tachycardia likely pain related   - On Toprol and improved  - Continue to monitor on tele   - Cardio eval appreciated    # Visual Disturbance  - CT Head w/ old infarcts  - On ASA and Statin   - Opthalmology eval appreciated    # Indigestion and Constipation   - PPI, miralax and senna continued  - Monitor BMs    # Anemia likely mixed AOCD vs iron deficiency   - HH stable in 9s on HSQ  - Consider iron tabs when optimized   - Monitor HH   - Transfuse for Hgb < 8  - Maintain active T/S    # Diabetes Mellitus A1C 11.6   - Continue on lantus 10 with tradjecta 5 and ISS   - Diabetic DASH diet   - Monitor and adjust glucose levels PRN   - Endocrine following; F/u recs     # HLD and HLD  - Continue on lipitor and toprol  - Monitor BP    # DISPO TBD      Discussed with Attending             65 YO F with PMHx of HFrEF 30-35 and grade 2 ddfxn (last TTE on 01/16/25), DM2, and diabetic foot ulcer. Recent admission at [facility] for ADHF. Patient now represents to OSH and ultimately to [facility] as a transfer for worsening right leg pain and fluid secretion. As per documentation, patient was reported to have severe depletion of RLE blood flow beyond the popliteal vessel at knee and similar findings in the left. Patient was transferred to [facility] for CO2 angiogram with Dr. Baltazar. Of note, patient also with reported visual disturbance for which patient was seen and evaluated by opthalmology. Internal Medicine has been consulted on Ms. Kirby's care for medical management.     # ADHF/ BiV HFrEF 20   - Recent admission at [facility] for ADHF and on dobutamine outpatient  - TTE 1/16 with EF 30-35 with moderately reduced LVSF and grade 2 ddfxn, normal RVSF , trace TR/ IA, and trace pericardial effusion  - NST abnormal with small-sized, moderate defect in apical wall that is predominantly fixed suggestive of an infarction with minimal marcus-infarct ischemia. Post stress LVEF 25.  - CT Chest with small BL pleural effusions and small pericardial effusion.  - RPT TTE with EF 20, severely decreased LV, decreased RVSF with TAPSE 1.2, and regional wall motion abnormalities present with entire septum, entire apex, entire inferior wall, mid inferolateral segment, and mid anterolateral segment are hypokinetic.   - RHC with RA 20, PA 49/29 (40), PCWP 35, mVO2 51.1, CO/CI 3.8/2.2, SVR 1095 w/ Multivessel CAD   - s/p zaroxyln 10 QD  - Continue on milrinone 0.125mcg GTT (decreased 2/18)  - CRE rising and bumex GTT stopped 2/18  - Sodium rising and augmentation with HTS stopped 2/16   - Continue on aldactone 50 QD   - HF following and adjusting medications   - Case discussed with EP and needs to be on GDMT for 3 months before AICD placement and should be stabilized off of inotropic support.   - Monitor I and O, diuresis per Cardio/ Renal    - GDMT as per Cardio  - Serial CXR   - Monitor volume status   - Check daily weights    - Monitor on telemetry; Monitor electrolytes   - Cardio, HF, Renal, and EP evals appreciated; F/u recs     # CAD with TVD   - C with RCA , severe ostial LM disease, diffuse disease in LAD and LCx, some L to R collaterals (Multivessel CAD)  - Case discussed with CTSx and NOT a candidate for CABG due to comorbidities  - Pending cardiac MRI for possible viability  - AS and Statin   - Cardiology following     # BL LE Edema likely in setting of ADHF  - Diuresis as per Cardio, HF and Renal   - BL LE elevation and compression  - LE Duplex neg   - Monitor for now  - Podiatry and Vascular evals appreciated; F/u recs    # Pericardial effusion likely in setting ADHF  - TTE with trace pericardial effusion   - CT Chest with small pericardial effusion   - Denies chest pain, palpitations, chest tightness or discomfort, shortness of breath or dyspnea   - Repeat TTE in 1 month for further evaluation   - Diuresis per cardio/ renal.  - Monitor on telemetry  - Cardio following    # Pleural effusion likely in setting ADHF  - CT Chest with small BL pleural effusions  - On RA with no respiratory complaints at present  - Monitor O2 saturation  - Supplement to maintain > 90%   - Diuresis per cardio/ renal.     # JAMEL with Hyponatremia and Metabolic Acidosis   - Baseline CRE 0.7-1.2 and increased to ~4  - As per OSH documentation, JAMEL was thought to be second to Unasyn/ Bumex / Entresto use and Hypotension   - US RENAL with no hydronephrosis  - CRE improved slightly with diuresis/ inotropic support and likely cardiorenal induced with low flow state in ADHF, however now rising again and concern for milrinone vs overdiuresis.   - Milrinone adjusted as above and diuresis holiday per HF .   - RPT BMP THIS EVENING  - HF following and adjusting medications.    - Continue with HF support as above  - Avoid nephrotoxic agents  - Monitor Cr and daily BMP   - Renal eval appreciated    # Hypernatremia   - Hypernatremia likely from HTS vs over diuresis/ intravascular dehydraiton   - Hold further HTS   - Sodium improved   - Monitor sodium     # Leukocytosis likely in setting of BL LE ulcers with pop occlusion   - BCx negative   - UCx with probable contamination   - On unasyn for ABX. Frequency increased to Q12 as per Renal   - Leukocytosis fluctuating, however appears nontoxic with no fever spikes and monitoring closely on below unasyn.   - If febrile check pan cultures   - Trend CBC, temp curve, VS and adjust as tolerated    # Foot ulcers with worsening RLE pain second to pop artery occlusion +/- diabetic ulcers   - RLE Arterial Duplex with popliteal artery occlusion   - LLE Arterial Duplex with underlying disease cannot be excluded   - Patient transferred to [facility] for CO2 angiogram, however given ADHF currently not medically optimized and high risk. Plan to continue on diuresis and inotropic support as above   - Continue on Lipitor  - Was on Heparin GTT for now and now on HSQ  - Continue on unasyn (2/6 - ) for now and will need to discuss end date/ duration of ABX based upon angiogram results vs future procedures.   - Given continued pain and plan for CO2 angio 2/19   - Continue pain control with oxy  - Wound care called for dressing recs   - Vascular following     # Tachycardia likely pain related   - On Toprol and improved  - Continue to monitor on tele   - Cardio eval appreciated    # Visual Disturbance  - CT Head w/ old infarcts  - On ASA and Statin   - Opthalmology eval appreciated    # Indigestion and Constipation   - PPI, miralax and senna continued  - Monitor BMs    # Anemia likely mixed AOCD vs iron deficiency   - HH stable in 9s on HSQ  - Consider iron tabs when optimized   - Monitor HH   - Transfuse for Hgb < 8  - Maintain active T/S    # Diabetes Mellitus A1C 11.6   - Continue on lantus 10 with tradjecta 5 and ISS   - Diabetic DASH diet   - Monitor and adjust glucose levels PRN   - Endocrine following; F/u recs     # HLD and HLD  - Continue on lipitor and toprol  - Monitor BP    # DISPO TBD      Discussed with Attending

## 2025-02-18 NOTE — PROGRESS NOTE ADULT - SUBJECTIVE AND OBJECTIVE BOX
Patient seen and examined at bedside.    Overnight Events: NAEON    REVIEW OF SYSTEMS:  CONSTITUTIONAL: No weakness, fevers or chills  EYES/ENT: No visual changes;  No dysphagia  NECK: No pain or stiffness  RESPIRATORY: No cough, wheezing, hemoptysis; No shortness of breath  CARDIOVASCULAR: No chest pain or palpitations; No lower extremity edema  GASTROINTESTINAL: No abdominal or epigastric pain. No nausea, vomiting, or hematemesis; No diarrhea or constipation. No melena or hematochezia.  BACK: No back pain  GENITOURINARY: No dysuria, frequency or hematuria  NEUROLOGICAL: No numbness or weakness  SKIN: No itching, burning, rashes, or lesions   All other review of systems is negative unless indicated above.            Current Meds:  acetaminophen     Tablet .. 650 milliGRAM(s) Oral every 6 hours PRN  ampicillin/sulbactam  IVPB 3 Gram(s) IV Intermittent every 12 hours  ampicillin/sulbactam  IVPB      aspirin enteric coated 81 milliGRAM(s) Oral daily  atorvastatin 40 milliGRAM(s) Oral at bedtime  benzocaine/menthol Lozenge 1 Lozenge Oral once  bisacodyl 5 milliGRAM(s) Oral every 12 hours  dextrose 5%. 1000 milliLiter(s) IV Continuous <Continuous>  dextrose 5%. 1000 milliLiter(s) IV Continuous <Continuous>  dextrose 50% Injectable 25 Gram(s) IV Push once  dextrose 50% Injectable 12.5 Gram(s) IV Push once  dextrose 50% Injectable 25 Gram(s) IV Push once  dextrose Oral Gel 15 Gram(s) Oral once PRN  glucagon  Injectable 1 milliGRAM(s) IntraMuscular once  heparin   Injectable 5000 Unit(s) SubCutaneous every 8 hours  insulin glargine Injectable (LANTUS) 10 Unit(s) SubCutaneous at bedtime  insulin lispro (ADMELOG) corrective regimen sliding scale   SubCutaneous three times a day before meals  insulin lispro (ADMELOG) corrective regimen sliding scale   SubCutaneous at bedtime  linagliptin 5 milliGRAM(s) Oral daily  melatonin 3 milliGRAM(s) Oral at bedtime PRN  milrinone Infusion 0.25 MICROgram(s)/kG/Min IV Continuous <Continuous>  ondansetron Injectable 4 milliGRAM(s) IV Push every 6 hours PRN  oxyCODONE    IR 5 milliGRAM(s) Oral every 4 hours PRN  pantoprazole  Injectable 40 milliGRAM(s) IV Push daily  polyethylene glycol 3350 17 Gram(s) Oral daily  senna 2 Tablet(s) Oral at bedtime  sodium chloride 0.65% Nasal 1 Spray(s) Both Nostrils three times a day PRN  spironolactone 50 milliGRAM(s) Oral daily      Vitals:  T(F): 98.1 (02-18), Max: 98.3 (02-17)  HR: 103 (02-18) (100 - 106)  BP: 112/60 (02-18) (94/57 - 117/66)  RR: 17 (02-18)  SpO2: 94% (02-18)  I&O's Summary    17 Feb 2025 07:01  -  18 Feb 2025 07:00  --------------------------------------------------------  IN: 1224.4 mL / OUT: 1425 mL / NET: -200.6 mL        Physical Exam:  GENERAL: NAD  HEAD:  Atraumatic, Normocephalic.  NECK: Supple, No JVD.  CHEST/LUNG: Clear to auscultation bilaterally; No rales, rhonchi, wheezing, or rubs. Speaking in full sentences  HEART: Regular rate and rhythm; No murmurs, rubs, or gallops.  ABDOMEN: Bowel sounds present; Soft, Nontender, Nondistended.   EXTREMITIES:  Bilateral edema with both legs wrapped in ACE wraps, full evaluation limited due to patient discomfort                            8.6    17.21 )-----------( 420      ( 18 Feb 2025 07:16 )             24.5     02-18    141  |  95[L]  |  76[H]  ----------------------------<  192[H]  3.4[L]   |  23  |  3.49[H]    Ca    9.6      18 Feb 2025 07:17  Mg     2.1     02-18

## 2025-02-18 NOTE — PROGRESS NOTE ADULT - PROBLEM SELECTOR PLAN 2
-TTE with severely decreased LV systolic function w/ LVEF 20%, regional WMA and multiple segmental abnormalities, reduced RV systolic function. LV function worsening since prior echo  -S/p cath with multivessel disease  -On milrinone drip   -Bumex gtt held due to rising creatinine   -Management per HF team.

## 2025-02-18 NOTE — PROGRESS NOTE ADULT - SUBJECTIVE AND OBJECTIVE BOX
[facility] NEPHROLOGY- PROGRESS NOTE    64y Female with history of CHF presents as a transfer for LE CO2 angiogram. Nephrology consulted for elevated Scr.    Sleeping, but easy to awaken. Still reports pain in her lower extremities.    REVIEW OF SYSTEMS:  Gen: no fevers  Cards: no chest pain  Resp: + dyspnea with exertion, + cough  GI: no nausea and vomiting, no diarrhea  Vascular: + LE edema improving.   Msk: Persistent pain upon moving legs.    No Known Allergies      Hospital Medications: Medications reviewed    VITALS:  T(F): 97.7 (02-18-25 @ 13:48), Max: 98.3 (02-17-25 @ 21:13)  HR: 102 (02-18-25 @ 13:48)  BP: 115/69 (02-18-25 @ 13:48)  RR: 18 (02-18-25 @ 13:48)  SpO2: 96% (02-18-25 @ 13:48)  Wt(kg): --    02-17 @ 07:01  -  02-18 @ 07:00  --------------------------------------------------------  IN: 1224.4 mL / OUT: 1425 mL / NET: -200.6 mL    02-18 @ 07:01  -  02-18 @ 16:50  --------------------------------------------------------  IN: 360 mL / OUT: 300 mL / NET: 60 mL            PHYSICAL EXAM:    Gen: NAD, calm  Cards: RRR, +S1/S2, no M/G/R  Resp: bibasilar rales  GI: soft, NT/ND, NABS  Vascular: 2+ RLE edema > LLE edema with legs wrapped      LABS:  02-18    Na+ trend: 141 <--, 150 <--, 142 <--, 139 <--, 138 <--, 141 <--, 139 <--    141  |  95[L]  |  76[H]  ----------------------------<  192[H]  3.4[L]   |  23  |  3.49[H]    Ca    9.6      18 Feb 2025 07:17  Mg     2.1     02-18      Creatinine Trend: 3.49 <--, 2.93 <--, 2.95 <--, 2.81 <--, 2.80 <--, 2.74 <--, 2.40 <--, 2.39 <--, 2.37 <--, 2.40 <--, 2.50 <--, 2.39 <--, 2.49 <--                        8.6    17.21 )-----------( 420      ( 18 Feb 2025 07:16 )             24.5     Urine Studies:  Urinalysis Basic - ( 18 Feb 2025 07:17 )    Color:  / Appearance:  / SG:  / pH:   Gluc: 192 mg/dL / Ketone:   / Bili:  / Urobili:    Blood:  / Protein:  / Nitrite:    Leuk Esterase:  / RBC:  / WBC    Sq Epi:  / Non Sq Epi:  / Bacteria:

## 2025-02-18 NOTE — PROGRESS NOTE ADULT - SUBJECTIVE AND OBJECTIVE BOX
MR#[id number]  PATIENT NAME:COLE ALBA    DATE OF SERVICE: 02-18-25 @ 06:27  Patient was seen and examined by Yordy Gilmore MD on    02-18-25 @ 06:27 .  Interim events noted.Consultant notes ,Labs,Telemetry reviewed by me       HOSPITAL COURSE: HPI:  64F, hx of CHF (last TTE on 1/16/25 EF 30-35%, G2DD), DM, diabetic foot ulcer with a recent admission at [facility] for CHF exacerbation presented as a transfer for worsening R. leg pain and fluid secretion, On non-invasive imaging demonstrating severe depletion of RLE blood flow beyond the popliteal vessel at knee and similar findings in L. Was transferred to [facility] for CO2 angiogram with Dr. Baltazar. (29 Jan 2025 20:05)    Transferred from [facility]-TTE on previous admission: LVSF moderately decreased w/EF 30-35%. Moderate G2DD. Mild MR. Ischemic evaluation was recommended for which patient undergone stress test which revealed a small-sized moderate defect in the apical wall that is predominantly fixed suggestive of an infarction with minimal marcus-infarct ischemia. 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 Left leg Arterial Duplex: Slightly tardus parvus waveform of the left popliteal artery is noted, for which underlying disease cannot be excluded. Posterior tibial artery waveform is nonpulsatile. Anterior tibial artery tardus parvus flow is noted. Patient started on Heparin and Dobutamine drip with transfer to [facility] for further management.        INTERIM EVENTS:Patient seen at bedside ,interim events noted.  02/08-on  5mcg and Bumex gtt   02/10-OOB still orthopneac [id number] LE weakness and swelling  02/11-c/o LE pain not dyspneac  held on Bumex 3 mg/Hr  02/12-Awake seen by HF   02/13-Remains on Bumex 3mg/Hr for possible LHC/RHC  02/14-Had cath Multivessel CAD started on Milrinone for CTS evaluation albeit targets not optimal  02/15-Awake on Milrinone and Bumex gtt-seen by CTS not a surgical candidate-needs Vascular work-up for LE PAD-scheduled for Angiogram on Wednesday    Weight 170Kg---> 164 Kg--->161.1 Kg  02/16-Awake is able to lie flat c/o RLE pain Sinus Rhythm UOP-2750mL,on Milrinone 0.25mcg Bumex 3mg/Hr,Metolazone 10mg  02/17-Awake is able to lie flat remains on Milrinone and Bumex 3mg/Hr UOP-1500mL-  02/18-Awake no chest pain or dyspnea at rest Hypertonic saline stopped Na 150,Bumex held remains on Milrinone gtt Sinus rhythm  UOP-1425mL        MEDICATIONS  (STANDING):  ampicillin/sulbactam  IVPB 3 Gram(s) IV Intermittent every 12 hours  ampicillin/sulbactam  IVPB      aspirin enteric coated 81 milliGRAM(s) Oral daily  atorvastatin 40 milliGRAM(s) Oral at bedtime  benzocaine/menthol Lozenge 1 Lozenge Oral once  bisacodyl 5 milliGRAM(s) Oral every 12 hours  heparin   Injectable 5000 Unit(s) SubCutaneous every 8 hours  insulin glargine Injectable (LANTUS) 10 Unit(s) SubCutaneous at bedtime  insulin lispro (ADMELOG) corrective regimen sliding scale   SubCutaneous three times a day before meals  insulin lispro (ADMELOG) corrective regimen sliding scale   SubCutaneous at bedtime  linagliptin 5 milliGRAM(s) Oral daily  milrinone Infusion 0.25 MICROgram(s)/kG/Min (5.41 mL/Hr) IV Continuous <Continuous>  pantoprazole  Injectable 40 milliGRAM(s) IV Push daily  polyethylene glycol 3350 17 Gram(s) Oral daily  senna 2 Tablet(s) Oral at bedtime  spironolactone 50 milliGRAM(s) Oral daily    MEDICATIONS  (PRN):  acetaminophen     Tablet .. 650 milliGRAM(s) Oral every 6 hours PRN Mild Pain (1 - 3)  dextrose Oral Gel 15 Gram(s) Oral once PRN Blood Glucose LESS THAN 70 milliGRAM(s)/deciliter  melatonin 3 milliGRAM(s) Oral at bedtime PRN Insomnia  ondansetron Injectable 4 milliGRAM(s) IV Push every 6 hours PRN Nausea and/or Vomiting  oxyCODONE    IR 5 milliGRAM(s) Oral every 4 hours PRN Severe Pain (7 - 10)  sodium chloride 0.65% Nasal 1 Spray(s) Both Nostrils three times a day PRN Dryness            REVIEW OF SYSTEMS:  Constitutional: [ ] fever, [ ]weight loss,  [x ]fatigue [ ]weight gain  Eyes: [ ] visual changes  Respiratory: [ ]shortness of breath;  [ ] cough, [ ]wheezing, [ ]chills, [ ]hemoptysis  Cardiovascular: [ ] chest pain, [ ]palpitations, [ ]dizziness,  [x ]leg swelling[ ]orthopnea[ ]PND  Gastrointestinal: [ ] abdominal pain, [ ]nausea, [ ]vomiting,  [ ]diarrhea [ ]Constipation [ ]Melena  Genitourinary: [ ] dysuria, [ ] hematuria [ ]Montgomery  Neurologic: [ ] headaches [ ] tremors[ ]weakness [ ]Paralysis Right[ ] Left[ ]  Skin: [ ] itching, [ ]burning, [ ] rashes  Endocrine: [ ] heat or cold intolerance  Musculoskeletal: [ ] joint pain or swelling; [x ] muscle, back, or extremity pain  Psychiatric: [ ] depression, [ ]anxiety, [ ]mood swings, or [ ]difficulty sleeping  Hematologic: [ ] easy bruising, [ ] bleeding gums    [ ] All remaining systems negative except as per above.   [ ]Unable to obtain.  [x] No change in ROS since admission      Vital Signs Last 24 Hrs  T(C): 36.7 (18 Feb 2025 05:09), Max: 36.8 (17 Feb 2025 08:49)  T(F): 98.1 (18 Feb 2025 05:09), Max: 98.3 (17 Feb 2025 08:49)  HR: 103 (18 Feb 2025 05:09) (100 - 106)  BP: 112/60 (18 Feb 2025 05:09) (94/57 - 117/66)  RR: 17 (18 Feb 2025 05:09) (17 - 18)  SpO2: 94% (18 Feb 2025 05:09) (91% - 98%)    Parameters below as of 18 Feb 2025 05:09  Patient On (Oxygen Delivery Method): room air      I&O's Summary    16 Feb 2025 07:01  -  17 Feb 2025 07:00  --------------------------------------------------------  IN: 1598.9 mL / OUT: 1500 mL / NET: 98.9 mL    17 Feb 2025 07:01  -  18 Feb 2025 06:27  --------------------------------------------------------  IN: 1224.4 mL / OUT: 1425 mL / NET: -200.6 mL        PHYSICAL EXAM:  General: No acute distress BMI-25  HEENT: EOMI, PERRL  Neck: Supple, [ ] JVD  Lungs: Equal air entry bilaterally; [ ] rales [ ] wheezing [ ] rhonchi  Heart: Regular rate and rhythm; [x ] murmur   2/6 [ x] systolic [ ] diastolic [ ] radiation[ ] rubs [ ]  gallops  Abdomen: Nontender, bowel sounds present  Extremities: No clubbing, cyanosis, [ ] edema [x ]Bilateral lower extremity venous stasis wounds to dermis, mild serous drainage, no acute signs of infection. Left foot hallux distal tuft well adhered eschar, no acute signs of infection.   Nervous system:  Alert & Oriented X3, no focal deficits  Psychiatric: Normal affect  Skin: No rashes or lesions    LABS:  02-17    150[H]  |  106  |  66[H]  ----------------------------<  154[H]  3.4[L]   |  19[L]  |  2.93[H]    Ca    9.1      17 Feb 2025 06:21  Mg     1.9     02-17      Creatinine Trend: 2.93<--, 2.95<--, 2.81<--, 2.80<--, 2.74<--, 2.40<--                        8.4    15.13 )-----------( 383      ( 17 Feb 2025 06:18 )             25.3        TTE W or WO Ultrasound Enhancing Agent (02.10.25 @ 12:09) >  CONCLUSIONS:      1. Left ventricular cavity is mildly dilated. Left ventricular systolic function is severely decreased with an ejection fraction of 20 % by Winchester's method of disks. Regional wall motion abnormalities present.   2. Multiple segmental abnormalities exist. See findings.   The entire septum, entire apex, entire inferior wall, mid inferolateral segment, and mid anterolateral segment are hypokinetic. All remaining scored segments are normal.     3. Reduced right ventricular systolic function.   4. No pericardial effusion seen.   5. Compared to the transthoracic echocardiogram performed on 1/16/2025, Worsening of the left ventricular function.        VA Duplex Lower Ext Vein Scan, Bilat (02.10.25 @ 17:42) >  IMPRESSION: No evidence of bilateral DVT within the imaged veins.        Nuclear Stress Test-Pharmacologic.. (01.24.25 @ 10:31) >  Conclusions:   1. Myocardial Perfusion: Abnormal.   2. Qualitative Perfusion:      - small-sized, moderate defect(s) in the apical wall that is predominantly fixed suggestive of an infarction with minimal marcus-infarct ischemia.   3. The post stress left ventricular EF is 25 %. The stress end diastolic volume is 118 ml.   4. Results D/Wcardiologist Dr. Gilmore at 18:31        RHC: RA 20, PA 49/29 (40), PCWP 35, mVO2 51.1, CO/CI 3.8/2.2, SVR 1095  LHC: RCA , severe ostial LM disease, diffuse disease in LAD and LCx, some L to R collaterals

## 2025-02-18 NOTE — PROGRESS NOTE ADULT - ASSESSMENT
64y.o. Female with PAD, CLTI affecting b/l LE, CHF, chronic b/l LE wound R worsen then the L, with R foot blistering and ulceration was transfer to [facility] for CO2 angiogram. Currently patient Cr is elevated, not medically optimized for the procedure. Patient is currently followed by medicine and cardiology.     Recommendations:  - Continue ASA/statin  - 4 am labs  - NPO @ 12 am today   - cards/meds cleared for CO2 angio   - Consented  - Vascular following    Vascular Surgery  [id number]   64y.o. Female with PAD, CLTI affecting b/l LE, CHF, chronic b/l LE wound R worsen then the L, with R foot blistering and ulceration was transfer to [facility] for CO2 angiogram. Currently patient Cr is elevated, not medically optimized for the procedure. Patient is currently followed by medicine and cardiology.     Recommendations:  - Patient with uptrending WBC count, and tachycardic, could possibly delay OR tmr; please do septic workup for patient   - Continue ASA/statin  - 4 am labs  - NPO @ 12 am today   - cards/meds cleared for CO2 angio   - Consented  - Vascular following    Vascular Surgery  [id number]

## 2025-02-18 NOTE — PROGRESS NOTE ADULT - SUBJECTIVE AND OBJECTIVE BOX
SURGERY DAILY PROGRESS NOTE    24 Hour/Overnight Events: No acute events overnight    SUBJECTIVE: Patient seen and evaluated on AM rounds.   ------------------------------------------------------------------------------------------------------------  OBJECTIVE:  Vital Signs Last 24 Hrs  T(C): 36.7 (18 Feb 2025 05:09), Max: 36.8 (17 Feb 2025 13:16)  T(F): 98.1 (18 Feb 2025 05:09), Max: 98.3 (17 Feb 2025 21:13)  HR: 103 (18 Feb 2025 05:09) (100 - 106)  BP: 112/60 (18 Feb 2025 05:09) (94/57 - 117/66)  BP(mean): --  RR: 17 (18 Feb 2025 05:09) (17 - 18)  SpO2: 94% (18 Feb 2025 05:09) (91% - 98%)    Parameters below as of 18 Feb 2025 05:09  Patient On (Oxygen Delivery Method): room air      I&O's Detail    17 Feb 2025 07:01  -  18 Feb 2025 07:00  --------------------------------------------------------  IN:    IV PiggyBack: 100 mL    Milrinone: 124.4 mL    Oral Fluid: 1000 mL  Total IN: 1224.4 mL    OUT:    Voided (mL): 1425 mL  Total OUT: 1425 mL    Total NET: -200.6 mL          LABS:                        8.6    17.21 )-----------( 420      ( 18 Feb 2025 07:16 )             24.5     02-18    141  |  95[L]  |  76[H]  ----------------------------<  192[H]  3.4[L]   |  23  |  3.49[H]    Ca    9.6      18 Feb 2025 07:17  Mg     2.1     02-18          Urinalysis Basic - ( 18 Feb 2025 07:17 )    Color: x / Appearance: x / SG: x / pH: x  Gluc: 192 mg/dL / Ketone: x  / Bili: x / Urobili: x   Blood: x / Protein: x / Nitrite: x   Leuk Esterase: x / RBC: x / WBC x   Sq Epi: x / Non Sq Epi: x / Bacteria: x      Physical Exam:  General: NAD, resting in bed comfortably  Cardiac: Regular rate, normotensive  Respiratory: Nonlabored respirations, normal cw expansion, on RA  Neuro: AOx3, CNII-XII intact on gross examination  Vascular/Extremities: Warm, well perfused        RLE: Dressings in place, blistering and ulceration on the dorsal aspect of the foot        LLE: First digit dry gangrene on the plantar aspect

## 2025-02-19 ENCOUNTER — RESULT REVIEW (OUTPATIENT)
Age: 65
End: 2025-02-19

## 2025-02-19 ENCOUNTER — APPOINTMENT (OUTPATIENT)
Dept: VASCULAR SURGERY | Facility: HOSPITAL | Age: 65
End: 2025-02-19

## 2025-02-19 LAB
ANION GAP SERPL CALC-SCNC: 21 MMOL/L — HIGH (ref 5–17)
APTT BLD: 43.7 SEC — HIGH (ref 24.5–35.6)
BLD GP AB SCN SERPL QL: NEGATIVE — SIGNIFICANT CHANGE UP
BUN SERPL-MCNC: 84 MG/DL — HIGH (ref 7–23)
CALCIUM SERPL-MCNC: 9.2 MG/DL — SIGNIFICANT CHANGE UP (ref 8.4–10.5)
CHLORIDE SERPL-SCNC: 94 MMOL/L — LOW (ref 96–108)
CO2 SERPL-SCNC: 23 MMOL/L — SIGNIFICANT CHANGE UP (ref 22–31)
CREAT SERPL-MCNC: 4.07 MG/DL — HIGH (ref 0.5–1.3)
EGFR: 12 ML/MIN/1.73M2 — LOW
EGFR: 12 ML/MIN/1.73M2 — LOW
GLUCOSE BLDC GLUCOMTR-MCNC: 192 MG/DL — HIGH (ref 70–99)
GLUCOSE BLDC GLUCOMTR-MCNC: 192 MG/DL — HIGH (ref 70–99)
GLUCOSE BLDC GLUCOMTR-MCNC: 214 MG/DL — HIGH (ref 70–99)
GLUCOSE BLDC GLUCOMTR-MCNC: 231 MG/DL — HIGH (ref 70–99)
GLUCOSE SERPL-MCNC: 185 MG/DL — HIGH (ref 70–99)
HCT VFR BLD CALC: 25 % — LOW (ref 34.5–45)
HGB BLD-MCNC: 8.8 G/DL — LOW (ref 11.5–15.5)
INR BLD: 1.44 RATIO — HIGH (ref 0.85–1.16)
MAGNESIUM SERPL-MCNC: 2.1 MG/DL — SIGNIFICANT CHANGE UP (ref 1.6–2.6)
MCHC RBC-ENTMCNC: 25.6 PG — LOW (ref 27–34)
MCHC RBC-ENTMCNC: 35.2 G/DL — SIGNIFICANT CHANGE UP (ref 32–36)
MCV RBC AUTO: 72.7 FL — LOW (ref 80–100)
NRBC BLD AUTO-RTO: 1 /100 WBCS — HIGH (ref 0–0)
PHOSPHATE SERPL-MCNC: 4.6 MG/DL — HIGH (ref 2.5–4.5)
PLATELET # BLD AUTO: 404 K/UL — HIGH (ref 150–400)
POTASSIUM SERPL-MCNC: 4.3 MMOL/L — SIGNIFICANT CHANGE UP (ref 3.5–5.3)
POTASSIUM SERPL-SCNC: 4.3 MMOL/L — SIGNIFICANT CHANGE UP (ref 3.5–5.3)
PROTHROM AB SERPL-ACNC: 16.4 SEC — HIGH (ref 9.9–13.4)
RBC # BLD: 3.44 M/UL — LOW (ref 3.8–5.2)
RBC # FLD: 18.8 % — HIGH (ref 10.3–14.5)
RH IG SCN BLD-IMP: NEGATIVE — SIGNIFICANT CHANGE UP
SODIUM SERPL-SCNC: 138 MMOL/L — SIGNIFICANT CHANGE UP (ref 135–145)
WBC # BLD: 15.95 K/UL — HIGH (ref 3.8–10.5)
WBC # FLD AUTO: 15.95 K/UL — HIGH (ref 3.8–10.5)

## 2025-02-19 PROCEDURE — 99232 SBSQ HOSP IP/OBS MODERATE 35: CPT

## 2025-02-19 PROCEDURE — 75561 CARDIAC MRI FOR MORPH W/DYE: CPT | Mod: 26

## 2025-02-19 PROCEDURE — 93321 DOPPLER ECHO F-UP/LMTD STD: CPT | Mod: 26

## 2025-02-19 PROCEDURE — 93308 TTE F-UP OR LMTD: CPT | Mod: 26

## 2025-02-19 PROCEDURE — 93325 DOPPLER ECHO COLOR FLOW MAPG: CPT | Mod: 26

## 2025-02-19 RX ORDER — MUPIROCIN CALCIUM 20 MG/G
1 CREAM TOPICAL
Refills: 0 | Status: COMPLETED | OUTPATIENT
Start: 2025-02-19 | End: 2025-02-24

## 2025-02-19 RX ORDER — HEPARIN SODIUM 1000 [USP'U]/ML
5000 INJECTION INTRAVENOUS; SUBCUTANEOUS EVERY 8 HOURS
Refills: 0 | Status: DISCONTINUED | OUTPATIENT
Start: 2025-02-20 | End: 2025-03-10

## 2025-02-19 RX ORDER — HEPARIN SODIUM 1000 [USP'U]/ML
1 INJECTION INTRAVENOUS; SUBCUTANEOUS ONCE
Refills: 0 | Status: COMPLETED | OUTPATIENT
Start: 2025-02-19 | End: 2025-02-19

## 2025-02-19 RX ORDER — HEPARIN SODIUM 1000 [USP'U]/ML
5000 INJECTION INTRAVENOUS; SUBCUTANEOUS ONCE
Refills: 0 | Status: COMPLETED | OUTPATIENT
Start: 2025-02-19 | End: 2025-02-19

## 2025-02-19 RX ORDER — INSULIN GLARGINE-YFGN 100 [IU]/ML
9 INJECTION, SOLUTION SUBCUTANEOUS AT BEDTIME
Refills: 0 | Status: DISCONTINUED | OUTPATIENT
Start: 2025-02-19 | End: 2025-02-23

## 2025-02-19 RX ADMIN — AMPICILLIN SODIUM AND SULBACTAM SODIUM 200 GRAM(S): 1; .5 INJECTION, POWDER, FOR SOLUTION INTRAMUSCULAR; INTRAVENOUS at 11:28

## 2025-02-19 RX ADMIN — Medication 5 MILLIGRAM(S): at 05:12

## 2025-02-19 RX ADMIN — MUPIROCIN CALCIUM 1 APPLICATION(S): 20 CREAM TOPICAL at 17:43

## 2025-02-19 RX ADMIN — AMPICILLIN SODIUM AND SULBACTAM SODIUM 200 GRAM(S): 1; .5 INJECTION, POWDER, FOR SOLUTION INTRAMUSCULAR; INTRAVENOUS at 22:13

## 2025-02-19 RX ADMIN — Medication 40 MILLIGRAM(S): at 11:29

## 2025-02-19 RX ADMIN — Medication 81 MILLIGRAM(S): at 11:29

## 2025-02-19 RX ADMIN — INSULIN GLARGINE-YFGN 9 UNIT(S): 100 INJECTION, SOLUTION SUBCUTANEOUS at 22:23

## 2025-02-19 RX ADMIN — OXYCODONE HYDROCHLORIDE 5 MILLIGRAM(S): 30 TABLET ORAL at 22:13

## 2025-02-19 RX ADMIN — OXYCODONE HYDROCHLORIDE 5 MILLIGRAM(S): 30 TABLET ORAL at 22:43

## 2025-02-19 RX ADMIN — INSULIN LISPRO 1: 100 INJECTION, SOLUTION INTRAVENOUS; SUBCUTANEOUS at 17:43

## 2025-02-19 RX ADMIN — INSULIN LISPRO 2: 100 INJECTION, SOLUTION INTRAVENOUS; SUBCUTANEOUS at 12:39

## 2025-02-19 RX ADMIN — OXYCODONE HYDROCHLORIDE 5 MILLIGRAM(S): 30 TABLET ORAL at 05:12

## 2025-02-19 RX ADMIN — ATORVASTATIN CALCIUM 40 MILLIGRAM(S): 80 TABLET, FILM COATED ORAL at 22:13

## 2025-02-19 RX ADMIN — HEPARIN SODIUM 5000 UNIT(S): 1000 INJECTION INTRAVENOUS; SUBCUTANEOUS at 12:40

## 2025-02-19 RX ADMIN — Medication 5 MILLIGRAM(S): at 17:44

## 2025-02-19 RX ADMIN — POLYETHYLENE GLYCOL 3350 17 GRAM(S): 17 POWDER, FOR SOLUTION ORAL at 11:29

## 2025-02-19 RX ADMIN — HEPARIN SODIUM 5000 UNIT(S): 1000 INJECTION INTRAVENOUS; SUBCUTANEOUS at 05:12

## 2025-02-19 RX ADMIN — LINAGLIPTIN 5 MILLIGRAM(S): 5 TABLET, FILM COATED ORAL at 11:29

## 2025-02-19 RX ADMIN — OXYCODONE HYDROCHLORIDE 5 MILLIGRAM(S): 30 TABLET ORAL at 11:28

## 2025-02-19 RX ADMIN — OXYCODONE HYDROCHLORIDE 5 MILLIGRAM(S): 30 TABLET ORAL at 06:12

## 2025-02-19 RX ADMIN — OXYCODONE HYDROCHLORIDE 5 MILLIGRAM(S): 30 TABLET ORAL at 12:30

## 2025-02-19 RX ADMIN — HEPARIN SODIUM 5000 UNIT(S): 1000 INJECTION INTRAVENOUS; SUBCUTANEOUS at 22:23

## 2025-02-19 NOTE — PROGRESS NOTE ADULT - SUBJECTIVE AND OBJECTIVE BOX
MR#[id number]  PATIENT NAME:COLE ALBA    DATE OF SERVICE: 02-19-25 @ 07:39  Patient was seen and examined by Yordy Gilmore MD on    02-19-25 @ 07:39 .  Interim events noted.Consultant notes ,Labs,Telemetry reviewed by me       HOSPITAL COURSE:    64F, hx of CHF (last TTE on 1/16/25 EF 30-35%, G2DD), DM, diabetic foot ulcer with a recent admission at [facility] for CHF exacerbation presented as a transfer for worsening R. leg pain and fluid secretion, On non-invasive imaging demonstrating severe depletion of RLE blood flow beyond the popliteal vessel at knee and similar findings in L. Was transferred to [facility] for CO2 angiogram with Dr. Baltazar. (29 Jan 2025 20:05)    Transferred from [facility]TTE on previous admission: LVSF moderately decreased w/EF 30-35%. Moderate G2DD. Mild MR. Ischemic evaluation was recommended for which patient undergone stress test which revealed a small-sized moderate defect in the apical wall that is predominantly fixed suggestive of an infarction with minimal marcus-infarct ischemia. 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 Left leg Arterial Duplex: Slightly tardus parvus waveform of the left popliteal artery is noted, for which underlying disease cannot be excluded. Posterior tibial artery waveform is nonpulsatile. Anterior tibial artery tardus parvus flow is noted. Patient started on Heparin and Dobutamine drip with transfer to [facility] for further management.        INTERIM EVENTS:Patient seen at bedside ,interim events noted.  02/08-on  5mcg and Bumex gtt   02/10-OOB still orthopneac [id number] LE weakness and swelling  02/11-c/o LE pain not dyspneac  held on Bumex 3 mg/Hr  02/12-Awake seen by HF   02/13-Remains on Bumex 3mg/Hr for possible LHC/RHC  02/14-Had cath Multivessel CAD started on Milrinone for CTS evaluation albeit targets not optimal  02/15-Awake on Milrinone and Bumex gtt-seen by CTS not a surgical candidate-needs Vascular work-up for LE PAD-scheduled for Angiogram on Wednesday    Weight 170Kg---> 164 Kg--->161.1 Kg  02/16-Awake is able to lie flat c/o RLE pain Sinus Rhythm UOP-2750mL,on Milrinone 0.25mcg Bumex 3mg/Hr,Metolazone 10mg  02/17-Awake is able to lie flat remains on Milrinone and Bumex 3mg/Hr UOP-1500mL-  02/18-Awake no chest pain or dyspnea at rest Hypertonic saline stopped Na 150,Bumex held remains on Milrinone gtt Sinus rhythm  UOP-1425mL  02/19-Awake is off diuretics for cMR and CO2 angiogram      MEDICATIONS  (STANDING):  ampicillin/sulbactam  IVPB 3 Gram(s) IV Intermittent every 12 hours  ampicillin/sulbactam  IVPB      aspirin enteric coated 81 milliGRAM(s) Oral daily  atorvastatin 40 milliGRAM(s) Oral at bedtime  benzocaine/menthol Lozenge 1 Lozenge Oral once  bisacodyl 5 milliGRAM(s) Oral every 12 hours  glucagon  Injectable 1 milliGRAM(s) IntraMuscular once  heparin   Injectable 5000 Unit(s) SubCutaneous every 8 hours  insulin glargine Injectable (LANTUS) 7 Unit(s) SubCutaneous at bedtime  insulin lispro (ADMELOG) corrective regimen sliding scale   SubCutaneous three times a day before meals  insulin lispro (ADMELOG) corrective regimen sliding scale   SubCutaneous at bedtime  linagliptin 5 milliGRAM(s) Oral daily  milrinone Infusion 0.125 MICROgram(s)/kG/Min (2.7 mL/Hr) IV Continuous <Continuous>  pantoprazole  Injectable 40 milliGRAM(s) IV Push daily  polyethylene glycol 3350 17 Gram(s) Oral daily  senna 2 Tablet(s) Oral at bedtime    MEDICATIONS  (PRN):  acetaminophen     Tablet .. 650 milliGRAM(s) Oral every 6 hours PRN Mild Pain (1 - 3)  dextrose Oral Gel 15 Gram(s) Oral once PRN Blood Glucose LESS THAN 70 milliGRAM(s)/deciliter  melatonin 3 milliGRAM(s) Oral at bedtime PRN Insomnia  ondansetron Injectable 4 milliGRAM(s) IV Push every 6 hours PRN Nausea and/or Vomiting  oxyCODONE    IR 5 milliGRAM(s) Oral every 4 hours PRN Severe Pain (7 - 10)  sodium chloride 0.65% Nasal 1 Spray(s) Both Nostrils three times a day PRN Dryness            REVIEW OF SYSTEMS:  Constitutional: [ ] fever, [ ]weight loss,  [ ]fatigue [ ]weight gain  Eyes: [ ] visual changes  Respiratory: [ ]shortness of breath;  [ ] cough, [ ]wheezing, [ ]chills, [ ]hemoptysis  Cardiovascular: [ ] chest pain, [ ]palpitations, [ ]dizziness,  [ ]leg swelling[ ]orthopnea[ ]PND  Gastrointestinal: [ ] abdominal pain, [ ]nausea, [ ]vomiting,  [ ]diarrhea [ ]Constipation [ ]Melena  Genitourinary: [ ] dysuria, [ ] hematuria [ ][place]  Neurologic: [ ] headaches [ ] tremors[ ]weakness [ ]Paralysis Right[ ] Left[ ]  Skin: [ ] itching, [ ]burning, [ ] rashes  Endocrine: [ ] heat or cold intolerance  Musculoskeletal: [ ] joint pain or swelling; [ ] muscle, back, or extremity pain  Psychiatric: [ ] depression, [ ]anxiety, [ ]mood swings, or [ ]difficulty sleeping  Hematologic: [ ] easy bruising, [ ] bleeding gums    [ ] All remaining systems negative except as per above.   [ ]Unable to obtain.  [x] No change in ROS since admission      Vital Signs Last 24 Hrs  T(C): 36.6 (19 Feb 2025 04:24), Max: 36.8 (18 Feb 2025 09:06)  T(F): 97.8 (19 Feb 2025 04:24), Max: 98.2 (18 Feb 2025 09:06)  HR: 110 (19 Feb 2025 04:24) (99 - 110)  BP: 117/71 (19 Feb 2025 04:24) (110/66 - 134/72)  BP(mean): --  RR: 18 (19 Feb 2025 04:24) (17 - 18)  SpO2: 95% (19 Feb 2025 04:24) (94% - 96%)    Parameters below as of 19 Feb 2025 04:24  Patient On (Oxygen Delivery Method): room air      I&O's Summary    18 Feb 2025 07:01  -  19 Feb 2025 07:00  --------------------------------------------------------  IN: 740 mL / OUT: 1335 mL / NET: -595 mL        PHYSICAL EXAM:  General: No acute distress BMI-24  HEENT: EOMI, PERRL  Neck: Supple, [ ] JVD  Lungs: Equal air entry bilaterally; [ ] rales [ ] wheezing [ ] rhonchi  Heart: Regular rate and rhythm; [x ] murmur   2/6 [ x] systolic [ ] diastolic [ ] radiation[ ] rubs [ ]  gallops  Abdomen: Nontender, bowel sounds present  Extremities: No clubbing, cyanosis, [ ] edema [ ]Pulses  equal and intact  Nervous system:  Alert & Oriented X3, no focal deficits  Psychiatric: Normal affect  Skin: No rashes or lesions    LABS:  02-19    138  |  94[L]  |  84[H]  ----------------------------<  185[H]  4.3   |  23  |  4.07[H]    Ca    9.2      19 Feb 2025 05:03  Phos  4.6     02-19  Mg     2.1     02-19      Creatinine Trend: 4.07<--, 3.49<--, 2.93<--, 2.95<--, 2.81<--, 2.80<--                        8.8    15.95 )-----------( 404      ( 19 Feb 2025 05:03 )             25.0     PT/INR - ( 19 Feb 2025 05:04 )   PT: 16.4 sec;   INR: 1.44 ratio         PTT - ( 19 Feb 2025 05:04 )  PTT:43.7 sec       TTE W or WO Ultrasound Enhancing Agent (02.10.25 @ 12:09) >  CONCLUSIONS:      1. Left ventricular cavity is mildly dilated. Left ventricular systolic function is severely decreased with an ejection fraction of 20 % by Winchester's method of disks. Regional wall motion abnormalities present.   2. Multiple segmental abnormalities exist. See findings.   The entire septum, entire apex, entire inferior wall, mid inferolateral segment, and mid anterolateral segment are hypokinetic. All remaining scored segments are normal.     3. Reduced right ventricular systolic function.   4. No pericardial effusion seen.   5. Compared to the transthoracic echocardiogram performed on 1/16/2025, Worsening of the left ventricular function.        VA Duplex Lower Ext Vein Scan, Bilat (02.10.25 @ 17:42) >  IMPRESSION: No evidence of bilateral DVT within the imaged veins.        Nuclear Stress Test-Pharmacologic.. (01.24.25 @ 10:31) >  Conclusions:   1. Myocardial Perfusion: Abnormal.   2. Qualitative Perfusion:      - small-sized, moderate defect(s) in the apical wall that is predominantly fixed suggestive of an infarction with minimal marcus-infarct ischemia.   3. The post stress left ventricular EF is 25 %. The stress end diastolic volume is 118 ml.   4. Results D/Wcardiologist Dr. Gilmore at 18:31        RHC: RA 20, PA 49/29 (40), PCWP 35, mVO2 51.1, CO/CI 3.8/2.2, SVR 1095  LHC: RCA , severe ostial LM disease, diffuse disease in LAD and LCx, some L to R collaterals

## 2025-02-19 NOTE — PROGRESS NOTE ADULT - SUBJECTIVE AND OBJECTIVE BOX
Follow-up Pulm Progress Note    lying flat - no c/o SOB  denies CP    Medications:  MEDICATIONS  (STANDING):  ampicillin/sulbactam  IVPB 3 Gram(s) IV Intermittent every 12 hours  ampicillin/sulbactam  IVPB      aspirin enteric coated 81 milliGRAM(s) Oral daily  atorvastatin 40 milliGRAM(s) Oral at bedtime  benzocaine/menthol Lozenge 1 Lozenge Oral once  bisacodyl 5 milliGRAM(s) Oral every 12 hours  dextrose 5%. 1000 milliLiter(s) (100 mL/Hr) IV Continuous <Continuous>  dextrose 5%. 1000 milliLiter(s) (50 mL/Hr) IV Continuous <Continuous>  dextrose 50% Injectable 25 Gram(s) IV Push once  dextrose 50% Injectable 12.5 Gram(s) IV Push once  dextrose 50% Injectable 25 Gram(s) IV Push once  glucagon  Injectable 1 milliGRAM(s) IntraMuscular once  heparin   Injectable 5000 Unit(s) SubCutaneous every 8 hours  insulin glargine Injectable (LANTUS) 7 Unit(s) SubCutaneous at bedtime  insulin lispro (ADMELOG) corrective regimen sliding scale   SubCutaneous three times a day before meals  insulin lispro (ADMELOG) corrective regimen sliding scale   SubCutaneous at bedtime  linagliptin 5 milliGRAM(s) Oral daily  milrinone Infusion 0.125 MICROgram(s)/kG/Min (2.7 mL/Hr) IV Continuous <Continuous>  pantoprazole  Injectable 40 milliGRAM(s) IV Push daily  polyethylene glycol 3350 17 Gram(s) Oral daily  senna 2 Tablet(s) Oral at bedtime    MEDICATIONS  (PRN):  acetaminophen     Tablet .. 650 milliGRAM(s) Oral every 6 hours PRN Mild Pain (1 - 3)  dextrose Oral Gel 15 Gram(s) Oral once PRN Blood Glucose LESS THAN 70 milliGRAM(s)/deciliter  melatonin 3 milliGRAM(s) Oral at bedtime PRN Insomnia  ondansetron Injectable 4 milliGRAM(s) IV Push every 6 hours PRN Nausea and/or Vomiting  oxyCODONE    IR 5 milliGRAM(s) Oral every 4 hours PRN Severe Pain (7 - 10)  sodium chloride 0.65% Nasal 1 Spray(s) Both Nostrils three times a day PRN Dryness          Vital Signs Last 24 Hrs  T(C): 36.6 (19 Feb 2025 04:24), Max: 36.6 (18 Feb 2025 17:07)  T(F): 97.8 (19 Feb 2025 04:24), Max: 97.9 (18 Feb 2025 17:07)  HR: 110 (19 Feb 2025 04:24) (99 - 110)  BP: 117/71 (19 Feb 2025 04:24) (110/71 - 134/72)  BP(mean): --  RR: 18 (19 Feb 2025 04:24) (17 - 18)  SpO2: 95% (19 Feb 2025 04:24) (94% - 96%)    Parameters below as of 19 Feb 2025 04:24  Patient On (Oxygen Delivery Method): room air              02-18 @ 07:01  -  02-19 @ 07:00  --------------------------------------------------------  IN: 740 mL / OUT: 1335 mL / NET: -595 mL          LABS:                        8.8    15.95 )-----------( 404      ( 19 Feb 2025 05:03 )             25.0     02-19    138  |  94[L]  |  84[H]  ----------------------------<  185[H]  4.3   |  23  |  4.07[H]    Ca    9.2      19 Feb 2025 05:03  Phos  4.6     02-19  Mg     2.1     02-19            CAPILLARY BLOOD GLUCOSE      POCT Blood Glucose.: 214 mg/dL (19 Feb 2025 06:08)    PT/INR - ( 19 Feb 2025 05:04 )   PT: 16.4 sec;   INR: 1.44 ratio         PTT - ( 19 Feb 2025 05:04 )  PTT:43.7 sec  Urinalysis Basic - ( 19 Feb 2025 05:03 )    Color: x / Appearance: x / SG: x / pH: x  Gluc: 185 mg/dL / Ketone: x  / Bili: x / Urobili: x   Blood: x / Protein: x / Nitrite: x   Leuk Esterase: x / RBC: x / WBC x   Sq Epi: x / Non Sq Epi: x / Bacteria: x                    Physical Examination:  PULM: coarse bilaterally   CVS: S1, S2 heard    RADIOLOGY REVIEWED  CXR: congestion   bibasilar atelectasis/effusions     CT chest:< from: CT Chest No Cont (01.25.25 @ 14:08) >  ACC: [id number] EXAM:  CT CHEST   ORDERED BY: TED MONROY     PROCEDURE DATE:  01/25/2025        INTERPRETATION:  Clinical information: Shortness of breath.    CT scan of the chest was obtained without administration of intravenous   contrast.    Several lymph nodes are present in the mediastinum.    Heart is enlarged in size. Calcification of the coronary arteries is   noted. Small pericardial effusion is noted.    No endobronchial lesions are noted. Patchy groundglass opacities noted   within both lungs are felt to be secondary to expiratory   technique/breathing artifact. Atelectasis is noted involving portions of   both lower lobes. This is secondary to small bilateral pleural effusions.    Below the diaphragm, visualized portions of the abdomen demonstrate   patient to be status post cholecystectomy.    Degenerative changes of the spine are noted.    IMPRESSION: Small bilateral pleural effusions and small pericardial   effusion.    --- End of Report ---    < end of copied text >      TTE:  < from: TTE W or WO Ultrasound Enhancing Agent (02.10.25 @ 12:09) >    TRANSTHORACIC ECHOCARDIOGRAM REPORT  ________________________________________________________________________________                                      _______       Pt. Name:       TOMASZ ALBA Study Date:    2/10/2025  MRN:            [id number]      YOB: 1960  Accession #:    [id number]       Age:           64 years  Account#:       [id number]    Gender:        F  Heart Rate:                     Height:        63.00 in (160.02 cm)  Rhythm:                         Weight:       147.00 lb (66.68 kg)  Blood Pressure: 112/56 mmHg     BSA/BMI:       1.70 m² / 26.04 kg/m²  ________________________________________________________________________________________  Referring Physician:       [id number] Mario Lu  Interpreting Physician:    Reg Glaser MD  Primary Sonographer:       Kassie Bergeron [facility]  Fellow (Performing):       Abbe Pickard MD  Fellow (Interpreting):     Abbe Pickard MD  Fellow (2nd Interpreting): Josefina Yo MD    CPT:                ECHO TTE W CON FU LTD - .m;DEFINITY ECHO CONTRAST PER                      ML WASTED - .m;DEFINITY ECHO CONTRAST PER ML - .m  Indication(s):      Abnormal electrocardiogram ECG EKG - R94.31  Procedure:          Limited transthoracic echocardiogram.  Ordering Location:  [facility]  Admission Status:   Inpatient  Contrast Injection: Verbal consent was obtained for injection of Ultrasonic                      Enhancing Agent following a discussion of risks and                      benefits.   Endocardial visualization enhanced with 2 ml of Definity                      Ultrasound enhancing agent (Lot#:[id number] Exp.Date:08/2025                      Discarded Dose:8ml).  UEA Reaction:       Patient had no adverse reaction after injection of                    Ultrasound Enhancing Agent.  Study Information:  Image quality for this study is fair.       CONCLUSIONS:      1. Left ventricular cavity is mildly dilated. Left ventricular systolic function is severely decreased with an ejection fraction of 20 % by Winchester's method of disks. Regional wall motion abnormalities present.   2. Multiple segmental abnormalities exist. See findings.   3. Reduced right ventricular systolic function.   4. No pericardial effusion seen.   5. Compared to the transthoracic echocardiogram performed on 1/16/2025, Worsening of the left ventricular function.    ________________________________________________________________________________________  FINDINGS:     Left Ventricle:  The left ventricular cavity is mildly dilated. Left ventricular systolic function is severely decreased with a calculated ejection fraction of 20 % by the Winchester's biplane method of disks. There are regional wall motion abnormalities present.  LV Wall Scoring: The entire septum, entire apex, entire inferior wall, mid  inferolateral segment, and mid anterolateral segment are hypokinetic. All  remaining scored segments are normal.          Right Ventricle:  The right ventricular cavity is normal in size and right ventricular systolic function is reduced. Tricuspid annular plane systolic excursion (TAPSE) is 1.2 cm (normal >=1.7 cm).     Pericardium:  No pericardial effusion seen.  ____________________________________________________________________  QUANTITATIVE DATA:  Left Ventricle Measurements: (Indexed to BSA)     BiPlane LV EF%: 20 %             Right Ventricle Measurements:     TAPSE: 1.2 cm       LVOT / RVOT/ Qp/Qs Data: (Indexed to BSA)  LVOT Vmax:      0.70 m/s  LVOT Vmn:       0.455 m/s  LVOT VTI:       11.00 cm  LVOT peak grad: 2 mmHg  LVOT mean grad: 1.0 mmHg    < end of copied text >

## 2025-02-19 NOTE — PROGRESS NOTE ADULT - SUBJECTIVE AND OBJECTIVE BOX
Patient seen and examined at bedside.    Overnight Events: NAEON, cr still uptrending    REVIEW OF SYSTEMS:  CONSTITUTIONAL: No weakness, fevers or chills  EYES/ENT: No visual changes;  No dysphagia  NECK: No pain or stiffness  RESPIRATORY: No cough, wheezing, hemoptysis; No shortness of breath  CARDIOVASCULAR: No chest pain or palpitations; No lower extremity edema  GASTROINTESTINAL: No abdominal or epigastric pain. No nausea, vomiting, or hematemesis; No diarrhea or constipation. No melena or hematochezia.  BACK: No back pain  GENITOURINARY: No dysuria, frequency or hematuria  NEUROLOGICAL: No numbness or weakness  SKIN: No itching, burning, rashes, or lesions   All other review of systems is negative unless indicated above.            Current Meds:  acetaminophen     Tablet .. 650 milliGRAM(s) Oral every 6 hours PRN  ampicillin/sulbactam  IVPB 3 Gram(s) IV Intermittent every 12 hours  ampicillin/sulbactam  IVPB      aspirin enteric coated 81 milliGRAM(s) Oral daily  atorvastatin 40 milliGRAM(s) Oral at bedtime  benzocaine/menthol Lozenge 1 Lozenge Oral once  bisacodyl 5 milliGRAM(s) Oral every 12 hours  dextrose 5%. 1000 milliLiter(s) IV Continuous <Continuous>  dextrose 5%. 1000 milliLiter(s) IV Continuous <Continuous>  dextrose 50% Injectable 25 Gram(s) IV Push once  dextrose 50% Injectable 12.5 Gram(s) IV Push once  dextrose 50% Injectable 25 Gram(s) IV Push once  dextrose Oral Gel 15 Gram(s) Oral once PRN  glucagon  Injectable 1 milliGRAM(s) IntraMuscular once  heparin   Injectable 5000 Unit(s) SubCutaneous every 8 hours  insulin glargine Injectable (LANTUS) 7 Unit(s) SubCutaneous at bedtime  insulin lispro (ADMELOG) corrective regimen sliding scale   SubCutaneous three times a day before meals  insulin lispro (ADMELOG) corrective regimen sliding scale   SubCutaneous at bedtime  linagliptin 5 milliGRAM(s) Oral daily  melatonin 3 milliGRAM(s) Oral at bedtime PRN  milrinone Infusion 0.125 MICROgram(s)/kG/Min IV Continuous <Continuous>  ondansetron Injectable 4 milliGRAM(s) IV Push every 6 hours PRN  oxyCODONE    IR 5 milliGRAM(s) Oral every 4 hours PRN  pantoprazole  Injectable 40 milliGRAM(s) IV Push daily  polyethylene glycol 3350 17 Gram(s) Oral daily  senna 2 Tablet(s) Oral at bedtime  sodium chloride 0.65% Nasal 1 Spray(s) Both Nostrils three times a day PRN      Vitals:  T(F): 98 (02-19), Max: 98 (02-19)  HR: 83 (02-19) (83 - 110)  BP: 107/68 (02-19) (107/68 - 134/72)  RR: 18 (02-19)  SpO2: 96% (02-19)  I&O's Summary    18 Feb 2025 07:01  -  19 Feb 2025 07:00  --------------------------------------------------------  IN: 740 mL / OUT: 1335 mL / NET: -595 mL        Physical Exam:  GEN: NAD  HEENT: EOMI, clear sclera  PULM: CTA b/l, no wheeze  CV: RRR S1 S2, no murmur, no JVD  ABD: S, NT, ND  EXT: WWP, no edema  PSYCH: normal affect  SKIN: No rash                          8.8    15.95 )-----------( 404      ( 19 Feb 2025 05:03 )             25.0     02-19    138  |  94[L]  |  84[H]  ----------------------------<  185[H]  4.3   |  23  |  4.07[H]    Ca    9.2      19 Feb 2025 05:03  Phos  4.6     02-19  Mg     2.1     02-19      PT/INR - ( 19 Feb 2025 05:04 )   PT: 16.4 sec;   INR: 1.44 ratio         PTT - ( 19 Feb 2025 05:04 )  PTT:43.7 sec

## 2025-02-19 NOTE — PROGRESS NOTE ADULT - PROBLEM SELECTOR PLAN 1
- continue milrinone 0.125 for now  - continue to hold diuretics today  -check perfusion markers (LFTs, lactate) daily  -obtain repeat TTE to evaluate for MR, TR, IVC, and LVOT VTI  - continue to hold spironolactone - continue milrinone 0.125 for now  - continue to hold diuretics today  -check perfusion markers (LFTs, lactate) daily  -obtain repeat TTE to evaluate for MR, TR, IVC, and LVOT VTI  - continue to hold spironolactone  - JAMEL is nonoliguric, possibly prerenal vs cardiorenal. TTE will help elucidate volume status.

## 2025-02-19 NOTE — PROGRESS NOTE ADULT - SUBJECTIVE AND OBJECTIVE BOX
Seen earlier today     Chief Complaint: Diabetes Mellitus follow up    INTERVAL HX: Was NPO after MN fopr LLE angio today , however it was cancelled due to worsening Cr. Scheduled to have non-tunneled catheter for hemodialysis placement tomorrow, not requiring NPO. Last 24 hour BGs in 185-200s with serum fasting  and POC . pt received reduced dose of Lantus 7 units last night .      tomorrow       Review of Systems:  General: As above  GI: No nausea, vomiting  Endocrine: no  S&Sx of hypoglycemia    Allergies    No Known Allergies    Intolerances    Augmentin (Stomach Upset; Vomiting; Nausea)    MEDICATIONS  (STANDING):  ampicillin/sulbactam  IVPB 3 Gram(s) IV Intermittent every 12 hours  ampicillin/sulbactam  IVPB      aspirin enteric coated 81 milliGRAM(s) Oral daily  atorvastatin 40 milliGRAM(s) Oral at bedtime  benzocaine/menthol Lozenge 1 Lozenge Oral once  bisacodyl 5 milliGRAM(s) Oral every 12 hours  dextrose 5%. 1000 milliLiter(s) (100 mL/Hr) IV Continuous <Continuous>  dextrose 5%. 1000 milliLiter(s) (50 mL/Hr) IV Continuous <Continuous>  dextrose 50% Injectable 25 Gram(s) IV Push once  dextrose 50% Injectable 12.5 Gram(s) IV Push once  dextrose 50% Injectable 25 Gram(s) IV Push once  glucagon  Injectable 1 milliGRAM(s) IntraMuscular once  heparin   Injectable 5000 Unit(s) SubCutaneous once  heparin Lock (1,000 Units/mL) Injectable 1 Dose(s) Catheter once  heparin Lock (1,000 Units/mL) Injectable 1 Dose(s) Catheter once  insulin glargine Injectable (LANTUS) 9 Unit(s) SubCutaneous at bedtime  insulin lispro (ADMELOG) corrective regimen sliding scale   SubCutaneous three times a day before meals  insulin lispro (ADMELOG) corrective regimen sliding scale   SubCutaneous at bedtime  linagliptin 5 milliGRAM(s) Oral daily  milrinone Infusion 0.125 MICROgram(s)/kG/Min (2.7 mL/Hr) IV Continuous <Continuous>  mupirocin 2% Ointment 1 Application(s) Both Nostrils two times a day  pantoprazole  Injectable 40 milliGRAM(s) IV Push daily  polyethylene glycol 3350 17 Gram(s) Oral daily  senna 2 Tablet(s) Oral at bedtime      atorvastatin   40 milliGRAM(s) Oral (02-18-25 @ 22:01)    insulin glargine Injectable (LANTUS)   7 Unit(s) SubCutaneous (02-18-25 @ 22:01)    insulin lispro (ADMELOG) corrective regimen sliding scale   2 Unit(s) SubCutaneous (02-19-25 @ 12:39)   1 Unit(s) SubCutaneous (02-18-25 @ 19:01)    linagliptin   5 milliGRAM(s) Oral (02-19-25 @ 11:29)        PHYSICAL EXAM:  VITALS: T(C): 36.6 (02-19-25 @ 12:55)  T(F): 97.9 (02-19-25 @ 12:55), Max: 98 (02-19-25 @ 10:34)  HR: 107 (02-19-25 @ 12:55) (83 - 110)  BP: 98/63 (02-19-25 @ 12:55) (98/63 - 134/72)  RR:  (17 - 18)  SpO2:  (94% - 96%)  Wt(kg): --  GENERAL: Female sitting in bed, in NAD  Respiratory: Respirations unlabored   Extremities: Warm, no edema  NEURO: Alert , appropriate     LABS:  POCT Blood Glucose.: 231 mg/dL (02-19-25 @ 12:02)  POCT Blood Glucose.: 214 mg/dL (02-19-25 @ 06:08)  POCT Blood Glucose.: 203 mg/dL (02-18-25 @ 23:54)  POCT Blood Glucose.: 195 mg/dL (02-18-25 @ 21:46)  POCT Blood Glucose.: 200 mg/dL (02-18-25 @ 18:09)  POCT Blood Glucose.: 241 mg/dL (02-18-25 @ 12:06)  POCT Blood Glucose.: 210 mg/dL (02-18-25 @ 08:07)  POCT Blood Glucose.: 227 mg/dL (02-17-25 @ 21:21)  POCT Blood Glucose.: 206 mg/dL (02-17-25 @ 17:02)  POCT Blood Glucose.: 197 mg/dL (02-17-25 @ 12:48)  POCT Blood Glucose.: 172 mg/dL (02-17-25 @ 08:04)  POCT Blood Glucose.: 169 mg/dL (02-16-25 @ 21:11)  POCT Blood Glucose.: 164 mg/dL (02-16-25 @ 18:40)                          8.8    15.95 )-----------( 404      ( 19 Feb 2025 05:03 )             25.0     02-19    138  |  94[L]  |  84[H]  ----------------------------<  185[H]  4.3   |  23  |  4.07[H]    Ca    9.2      19 Feb 2025 05:03  Phos  4.6     02-19  Mg     2.1     02-19      eGFR: 12 mL/min/1.73m2 (19 Feb 2025 05:03)    01-24 Chol 122 Direct LDL -- LDL calculated 66 HDL 39[L] Trig 89  Thyroid Function Tests:  01-24 @ 05:40 TSH 5.34 FreeT4 -- T3 -- Anti TPO -- Anti Thyroglobulin Ab -- TSI --      A1C with Estimated Average Glucose Result: 11.6 % (01-24-25 @ 05:40)  A1C with Estimated Average Glucose Result: >15.5 % (12-13-24 @ 06:49)    Estimated Average Glucose: 286 mg/dL (01-24-25 @ 05:40)  Estimated Average Glucose: >398 mg/dL (12-13-24 @ 06:49)        Diet, Regular:   Consistent Carbohydrate No Snacks (CSTCHO)  Low Sodium  No Concentrated Phosphorus  Lacto Veg (Accepts Milk & Milk Products) (02-14-25 @ 13:42) [Active]

## 2025-02-19 NOTE — PROGRESS NOTE ADULT - ASSESSMENT
65 y/o F with PMH of CHF (last TTE 1/2025 with EF 30-35%), DM, diabetic foot ulcer, PAD, CAD. Recent admission at [facility] for CHF exacerbation, presented to [facility] as a transfer for worsening R leg pain. Hospital course c/b acute on chronic CHF - TTE with EF 20%, severely decreased LV and RV function, multiple regional motion abnormalities, s/p LHC revealing TVD, pleural/pericardial effusions, JAMEL, leukocytosis suspected to be in the setting of LE wound on IV ABX, persistent RLE pain w/ RLE Arterial Duplex revealing popliteal artery occlusion  Plan for eventual angiogram with vascular when medically optimized. Pulmonary called to consult as pt with c/o SOB.

## 2025-02-19 NOTE — PROGRESS NOTE ADULT - ASSESSMENT
63y/o F w/h/o uncontrolled T2DM (A1C 11.6%) on Tresiba and CeQur insulin patch PTA. DM c/b PAD, retinopathy, CKD, CAD. Also h/oType 2 DM, HTN, HLD. Presented to OSH with worsening right leg pain and fluid secretion. Transferred to [facility] for CO2 angiogram. Found to have Low EF to 20% in OSBALDO. Endocrine consulted for Type 2 DM management. BG Goal 100-180mg/dl    Was NPO after MN fopr LLE angio today , however it was cancelled due to worsening Cr. Scheduled to have non-tunneled catheter for hemodialysis placement and HD tomorrow, not requiring NPO. Last 24 hour BGs in 185-200s with serum fasting  and POC . pt received reduced dose of Lantus 7 units last night . Prandial BGs were 200s for the past 2 days, would normally start low dose meal coverage insulin ( 1 or 2 units with meals) to control BGs better however her Cr worsening and plan to start HD tomorrow expecting decreased insulin requirement. Would continue to monitor off standing meal coverage insulin for now        Home regimen: Tresiba 40 units, CeQur insulin patch about 2-10 units TID with meals

## 2025-02-19 NOTE — PROGRESS NOTE ADULT - ASSESSMENT
64F, hx of CHF (last TTE on 1/16/25 EF 30-35%, G2DD), DM, diabetic foot ulcer with a recent admission at [facility] for CHF exacerbation 1/14-1/17 p/w worsening R. leg pain and fluid secretion, was meeting sepsis criteria with podiatry having low suspicion for right cellulitis and admitted for continued management of HF exacerbation and needing ischemic eval.     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  -Left leg Arterial Duplex: Slightly tardus parvus waveform of the left popliteal artery is noted, for which underlying disease cannot be excluded. Posterior tibial artery waveform is nonpulsatile. Anterior tibial artery tardus parvus flow is noted.   Transferred to [facility] for CO2 angiogram as per vascular surgery    #  PAD Wound of lower extremity.   ·  Plan: Podiatry following, b/l serous bullae, no concerns for infection  Found to have RLE coolness  -Right Leg Arterial Duplex: Popliteal artery is occluded with negligible flow of right trifurcation arteries.  -Left leg Arterial Duplex: Slightly tardus parvus waveform of the left popliteal artery is noted, for which underlying disease cannot be excluded. Posterior tibial artery waveform is nonpulsatile. Anterior tibial artery tardus parvus flow is noted.  -Started on heparin gtt and dobutamine gtt -Will transfer to [facility] for CO2 angiogram as per vascular surgery as not candidate for CTA due to worsening SCr  -Keep compression dressing  -LE elevation above heart level at rest.  -Transferred to [facility] for CO2 angiogram   - Overall this patient is at   intermediate  risk (for cardiac death, nonfatal myocardial infarction, and nonfatal cardiac arrest perioperatively for this intermediate  risk procedure).   No cardiac contraindications for CO2 vangiogram  There  are  no further recommendation for risk stratifying imaging/stress testing prior to planned surgery  Seen by Podiatry-No acute pod intervention, local wound care only- Pod stable for discharge   PAD with rest ischemia plan for Angiogram Wednesday-Dr Louis No cardiac contraindications for procedding with CO2 Anfgiogram        # HFrEF (congestive heart failure). Ischemic Cardiomyopathy  ·  Plan: Hx of HF, previous admission in January, on home Lasix 40 qD, Metoprolol Succ 25 qD. Not on Entresto due to insurance issues  Last TTE: LVSF moderately decreased w/ EF 30-35 %. Moderate G2DD. Mild MR  Stress test: small-sized, moderate defect(s) in the apical wall that is predominantly fixed suggestive of an infarction with minimal marcus-infarct ischemia  Ischemic cardiomyopathy  GDMT on hold 2/2 JAMEL    Repeat TTE EF 20% with WMA  Likely Ischemic cardiomyopathy despite NST revealing fixed defectLHC confirming  Multivessel CAD    LHC-RCA , severe ostial LM disease, diffuse disease in LAD and LCx, some L to R collaterals-seen by CTS advise cMR for viability poor target vessels for CABG-?High risk LM/LAD PCI    RHC: RA 20, PA 49/29 (40), PCWP 35, mVO2 51.1, CO/CI 3.8/2.2, SVR 1095    Started on Milrinone to assist augmented diuresis in attempt to avoid further renal failure  Has lost Weight 170---> 164 Kg---> 161.1 Kg---> 165 Kg  Creatinine gradually rising  Diuretic holiday  Will discuss for consideration of HD      LE EDEMA no DVT      # JAMEL  Creatinine Trend: 4.07<--, 3.49<--, 2.93<--2.95 <--2.81<--, 2.80<--, 2.74<--2.40<-- 2.37<--2.50<-- 2.98<--, 3.31<--, 2.65<--, 3.90<-- 1.17  Renal function worsening

## 2025-02-19 NOTE — PROGRESS NOTE ADULT - ASSESSMENT
63 YO F with PMHx of HFrEF 30-35 and grade 2 ddfxn (last TTE on 01/16/25), DM2, and diabetic foot ulcer. Recent admission at [facility] for ADHF. Patient now represents to OSH and ultimately to [facility] as a transfer for worsening right leg pain and fluid secretion. As per documentation, patient was reported to have severe depletion of RLE blood flow beyond the popliteal vessel at knee and similar findings in the left. Patient was transferred to [facility] for CO2 angiogram with Dr. Baltazar. Of note, patient also with reported visual disturbance for which patient was seen and evaluated by opthalmology. Internal Medicine has been consulted on Ms. Kirby's care for medical management.     # ADHF/ BiV HFrEF 20   - Recent admission at [facility] for ADHF and on dobutamine outpatient  - TTE 1/16 with EF 30-35 with moderately reduced LVSF and grade 2 ddfxn, normal RVSF , trace TR/ DC, and trace pericardial effusion  - NST abnormal with small-sized, moderate defect in apical wall that is predominantly fixed suggestive of an infarction with minimal marcus-infarct ischemia. Post stress LVEF 25.  - CT Chest with small BL pleural effusions and small pericardial effusion.  - RPT TTE with EF 20, severely decreased LV, decreased RVSF with TAPSE 1.2, and regional wall motion abnormalities present with entire septum, entire apex, entire inferior wall, mid inferolateral segment, and mid anterolateral segment are hypokinetic.   - RHC with RA 20, PA 49/29 (40), PCWP 35, mVO2 51.1, CO/CI 3.8/2.2, SVR 1095 w/ Multivessel CAD   - s/p zaroxyln 10 QD  - Continue on milrinone 0.125mcg GTT (decreased 2/18) with aldactone for now  - CRE and sodium rising and bumex GTT stopped 2/18 and HTS stopped 2/16   - Pending RPT TTE to reassess volume status   - HF following and adjusting medications   - Case discussed with EP and needs to be on GDMT for 3 months before AICD placement and should be stabilized off of inotropic support.   - Monitor I and O, diuresis per Cardio/ Renal    - GDMT as per Cardio  - Serial CXR   - Monitor volume status   - Check daily weights    - Monitor on telemetry; Monitor electrolytes   - Cardio, HF, Renal, and EP evals appreciated; F/u recs     # CAD with TVD   - LHC with RCA , severe ostial LM disease, diffuse disease in LAD and LCx, some L to R collaterals (Multivessel CAD)  - Case discussed with CTSx and NOT a candidate for CABG due to comorbidities  - Cardiac MRI done and pending discussion with cardiology   - AS and Statin   - Cardiology following     # BL LE Edema likely in setting of ADHF  - Diuresis as per Cardio, HF and Renal   - BL LE elevation and compression  - LE Duplex neg   - Monitor for now  - Podiatry and Vascular evals appreciated; F/u recs    # Pericardial effusion likely in setting ADHF  - TTE with trace pericardial effusion   - CT Chest with small pericardial effusion   - Denies chest pain, palpitations, chest tightness or discomfort, shortness of breath or dyspnea   - Repeat TTE in 1 month for further evaluation   - Diuresis per cardio/ renal.  - Monitor on telemetry  - Cardio following    # Pleural effusion likely in setting ADHF  - CT Chest with small BL pleural effusions  - On RA with no respiratory complaints at present  - Monitor O2 saturation  - Supplement to maintain > 90%   - Diuresis per cardio/ renal.     # JAMEL with Hyponatremia and Metabolic Acidosis   - Baseline CRE 0.7-1.2 and increased to ~4  - As per OSH documentation, JAMEL was thought to be second to Unasyn/ Bumex / Entresto use and Hypotension   - US RENAL with no hydronephrosis  - CRE improved slightly with diuresis/ inotropic support and likely cardiorenal induced with low flow state in ADHF, however now rising again and concern for milrinone vs overdiuresis (prerenal) vs cardiorenal.   - Milrinone adjusted as above and diuresis remains off per HF .   - Remains nonoliguric, however plan for shiley placement tomorrow and initiation of HD .  - HF following and adjusting medications.    - Continue with HF support as above  - Avoid nephrotoxic agents  - Monitor Cr and daily BMP   - Renal eval appreciated    # Hypernatremia   - Hypernatremia likely from HTS vs over diuresis/ intravascular dehydraiton   - Hold further HTS   - Sodium improved   - Monitor sodium     # Leukocytosis likely in setting of BL LE ulcers with pop occlusion   - BCx negative   - UCx with probable contamination   - On unasyn for ABX. Frequency increased to Q12 as per Renal   - Leukocytosis fluctuating, however appears nontoxic with no fever spikes and monitoring closely on below unasyn.   - If febrile check pan cultures   - Trend CBC, temp curve, VS and adjust as tolerated    # Foot ulcers with worsening RLE pain second to pop artery occlusion +/- diabetic ulcers   - RLE Arterial Duplex with popliteal artery occlusion   - LLE Arterial Duplex with underlying disease cannot be excluded   - Patient transferred to [facility] for CO2 angiogram, however given ADHF currently not medically optimized and high risk. Plan to continue on diuresis and inotropic support as above   - Continue on Lipitor  - Was on Heparin GTT for now and now on HSQ  - Continue on unasyn (2/6 - ) for now and will need to discuss end date/ duration of ABX based upon angiogram results vs future procedures.   - Given worsened renal fxn/ volume status CO2 angio on hold.  - Continue pain control with oxy  - Wound care called for dressing recs   - Vascular following     # Tachycardia likely pain related   - On Toprol and improved  - Continue to monitor on tele   - Cardio eval appreciated    # Visual Disturbance  - CT Head w/ old infarcts  - On ASA and Statin   - Opthalmology eval appreciated    # Indigestion and Constipation   - PPI, miralax and senna continued  - Monitor BMs    # Anemia likely mixed AOCD vs iron deficiency   - HH stable in 9s on HSQ  - Consider iron tabs when optimized   - Monitor HH   - Transfuse for Hgb < 8  - Maintain active T/S    # Diabetes Mellitus A1C 11.6   - Continue on lantus 10 with tradjecta 5 and ISS   - Diabetic DASH diet   - Monitor and adjust glucose levels PRN   - Endocrine following; F/u recs     # HLD and HLD  - Continue on lipitor and toprol  - Monitor BP    # DISPO TBD      Discussed with Attending

## 2025-02-19 NOTE — PROGRESS NOTE ADULT - ASSESSMENT
64F, hx of HFrEF (previously 30-35%, now 20%), never had LHC, DM, diabetic foot ulcer, severe PAD b/l, presenting initially for vascular angiogram, found to be in ADHF with volume overload. Cardiac output is preserved on RHC, but elevated filling pressures. After diuresis, now with rising creatinine. Clinically appears to be euvolemic although conflicting with significantly elevated filling pressures on prior RHC.     RHC: RA 20, PA 49/29 (40), PCWP 35, mVO2 51.1, CO/CI 3.8/2.2, SVR 1095  LHC: RCA , severe ostial LM disease, diffuse disease in LAD and LCx, some L to R collaterals    Cardiac testing:  cMRI 2/19/25 : LVEF is 25%, greater than 75% subendocardial scarring involving the basal to mid inferior wall of the left ventricle, left ventricular transmural scarring involving the apical septal wall and likely near transmural scarring of the apical lateral wall. 50% scarring of the left ventricular basal inferoseptal wall.  TTE 2/10/25: EF 20%, reduced RVSF  TTE 1/16/25: EF 30-35%, grade 2 DD  NST 1/24/25: small moderate defect in apical wall that is predominantly fixed    Home cardiac meds: toprol 25

## 2025-02-19 NOTE — PROGRESS NOTE ADULT - SUBJECTIVE AND OBJECTIVE BOX
[facility] NEPHROLOGY- PROGRESS NOTE    64y Female with history of CHF presents as a transfer for LE CO2 angiogram. Nephrology consulted for elevated Scr.    Still reports pain in her lower extremities. Unsure if she has complete comprehension of current medical situation. Spoke to all 3 of the patient's brothers alongside the patient and discussed plans to perform dialysis. Dialysis will aid in improving kidney function, which will allo angiogram and other cardiac interventions to be performed.    REVIEW OF SYSTEMS:  Gen: no fevers  Cards: no chest pain  Resp: + dyspnea with exertion, + cough  GI: no nausea and vomiting, no diarrhea  Vascular: Stable LE edema.  Msk: Persistent pain upon moving legs.    No Known Allergies      Hospital Medications: Medications reviewed    VITALS:  T(F): 97.9 (02-19-25 @ 12:55), Max: 98 (02-19-25 @ 10:34)  HR: 107 (02-19-25 @ 12:55)  BP: 98/63 (02-19-25 @ 12:55)  RR: 18 (02-19-25 @ 12:55)  SpO2: 96% (02-19-25 @ 12:55)  Wt(kg): --    02-18 @ 07:01  -  02-19 @ 07:00  --------------------------------------------------------  IN: 740 mL / OUT: 1335 mL / NET: -595 mL        PHYSICAL EXAM:    Gen: NAD, calm  Cards: RRR, +S1/S2, no M/G/R  Resp: bibasilar rales  GI: soft, NT/ND, NABS  Vascular: 2+ RLE edema > LLE edema with legs wrapped      LABS:  02-19    Na+ trend: 138 <--, 141 <--, 150 <--, 142 <--, 139 <--, 138 <--, 141 <--    138  |  94[L]  |  84[H]  ----------------------------<  185[H]  4.3   |  23  |  4.07[H]    Ca    9.2      19 Feb 2025 05:03  Phos  4.6     02-19  Mg     2.1     02-19      Creatinine Trend: 4.07 <--, 3.49 <--, 2.93 <--, 2.95 <--, 2.81 <--, 2.80 <--, 2.74 <--, 2.40 <--, 2.39 <--, 2.37 <--, 2.40 <--                        8.8    15.95 )-----------( 404      ( 19 Feb 2025 05:03 )             25.0     Urine Studies:  Urinalysis Basic - ( 19 Feb 2025 05:03 )    Color:  / Appearance:  / SG:  / pH:   Gluc: 185 mg/dL / Ketone:   / Bili:  / Urobili:    Blood:  / Protein:  / Nitrite:    Leuk Esterase:  / RBC:  / WBC    Sq Epi:  / Non Sq Epi:  / Bacteria:         RADIOLOGY AND ADDITIONAL STUDIES:      < from: MR Cardiac w/wo IV Cont (02.19.25 @ 09:44) >  IMPRESSION:.    The left ventricle demonstrates severe wall motion abnormalities and   function is depressed (calculated LVEF is 25%).    There is greater than 75% subendocardial scarring involving the basal to   mid inferior wall of the left ventricle.    There is left ventricular transmural scarring involving the apical septal   wall and likely near transmural scarring of the apical lateral wall.    There is about 50% scarring of the left ventricular basal inferoseptal   wall.    < end of copied text >    < from: Xray Chest 1 View- PORTABLE-Routine (Xray Chest 1 View- PORTABLE-Routine .) (02.17.25 @ 22:49) >  Frontal expiratory view of the chest shows the heart to be similar in   size. The lungs show progression of pulmonary congestion with small right   effusion and there is no evidence of pneumothorax nor definite left   pleural effusion.    < end of copied text >    < from: US Kidney and Bladder (01.30.25 @ 12:47) >  FINDINGS:  Right kidney: 11.7 cm. No renal mass, hydronephrosis or calculi.    Left kidney: 10.9 cm. No renal mass, hydronephrosis or calculi.    Urinary bladder: Underdistended.    Miscellaneous: Bilateral pleural effusion.    < end of copied text >      < from: TTE W or WO Ultrasound Enhancing Agent (02.10.25 @ 12:09) >  CONCLUSIONS:      1. Left ventricular cavity is mildly dilated. Left ventricular systolic function is severely decreased with an ejection fraction of 20 % by Winchester's method of disks. Regional wall motion abnormalities present.   2. Multiple segmental abnormalities exist. See findings.   3. Reduced right ventricular systolic function.   4. No pericardial effusion seen.   5. Compared to the transthoracic echocardiogram performed on 1/16/2025, Worsening of the left ventricular function.    < end of copied text >

## 2025-02-19 NOTE — PROGRESS NOTE ADULT - SUBJECTIVE AND OBJECTIVE BOX
INTERNAL MEDICINE PROGRESS NOTE     NAME OF PATIENT: TOMASZ ALBA  MRN: [id number]  DATE OF VISIT: 02-18-25 @ 14:32    SUBJECTIVE/ ROS:  - Patient seen and examined by bedside   - RLE CO2 angio on hold given worsening renal function   - CRE continues to rise likely prerenal vs cardiorenal.   - Remains nonoliguric and plan for shiley placement for HD initiation.   - Milrinone continued at 0.125 mcg and diuresis remains off.   - Pending repeat TTE.    OBJECTIVE:  ICU Vital Signs Last 24 Hrs  T(C): 36.8 (18 Feb 2025 09:06), Max: 36.8 (17 Feb 2025 21:13)  T(F): 98.2 (18 Feb 2025 09:06), Max: 98.3 (17 Feb 2025 21:13)  HR: 99 (18 Feb 2025 09:06) (99 - 106)  BP: 110/66 (18 Feb 2025 09:06) (94/57 - 112/60)  BP(mean): --  ABP: --  ABP(mean): --  RR: 18 (18 Feb 2025 09:06) (17 - 18)  SpO2: 94% (18 Feb 2025 09:06) (91% - 98%)    O2 Parameters below as of 18 Feb 2025 09:06  Patient On (Oxygen Delivery Method): room air          02-18-25 @ 14:32  T(C): 36.8 (02-18-25 @ 09:06), Max: 36.8 (02-17-25 @ 21:13)  HR: 99 (02-18-25 @ 09:06) (99 - 106)  BP: 110/66 (02-18-25 @ 09:06) (94/57 - 112/60)  RR: 18 (02-18-25 @ 09:06) (17 - 18)  SpO2: 94% (02-18-25 @ 09:06) (91% - 98%)  Wt(kg): --    CAPILLARY BLOOD GLUCOSE  POCT Blood Glucose.: 241 mg/dL (18 Feb 2025 12:06)      HOSPITAL MEDICATIONS:  MEDICATIONS  (STANDING):  ampicillin/sulbactam  IVPB 3 Gram(s) IV Intermittent every 12 hours  ampicillin/sulbactam  IVPB      aspirin enteric coated 81 milliGRAM(s) Oral daily  atorvastatin 40 milliGRAM(s) Oral at bedtime  benzocaine/menthol Lozenge 1 Lozenge Oral once  bisacodyl 5 milliGRAM(s) Oral every 12 hours  dextrose 5%. 1000 milliLiter(s) (100 mL/Hr) IV Continuous <Continuous>  dextrose 5%. 1000 milliLiter(s) (50 mL/Hr) IV Continuous <Continuous>  dextrose 50% Injectable 25 Gram(s) IV Push once  dextrose 50% Injectable 12.5 Gram(s) IV Push once  dextrose 50% Injectable 25 Gram(s) IV Push once  glucagon  Injectable 1 milliGRAM(s) IntraMuscular once  heparin   Injectable 5000 Unit(s) SubCutaneous every 8 hours  insulin glargine Injectable (LANTUS) 7 Unit(s) SubCutaneous at bedtime  insulin lispro (ADMELOG) corrective regimen sliding scale   SubCutaneous three times a day before meals  insulin lispro (ADMELOG) corrective regimen sliding scale   SubCutaneous at bedtime  linagliptin 5 milliGRAM(s) Oral daily  milrinone Infusion 0.125 MICROgram(s)/kG/Min (2.7 mL/Hr) IV Continuous <Continuous>  pantoprazole  Injectable 40 milliGRAM(s) IV Push daily  polyethylene glycol 3350 17 Gram(s) Oral daily  potassium chloride   Powder 40 milliEquivalent(s) Oral once  senna 2 Tablet(s) Oral at bedtime    MEDICATIONS  (PRN):  acetaminophen     Tablet .. 650 milliGRAM(s) Oral every 6 hours PRN Mild Pain (1 - 3)  dextrose Oral Gel 15 Gram(s) Oral once PRN Blood Glucose LESS THAN 70 milliGRAM(s)/deciliter  melatonin 3 milliGRAM(s) Oral at bedtime PRN Insomnia  ondansetron Injectable 4 milliGRAM(s) IV Push every 6 hours PRN Nausea and/or Vomiting  oxyCODONE    IR 5 milliGRAM(s) Oral every 4 hours PRN Severe Pain (7 - 10)  sodium chloride 0.65% Nasal 1 Spray(s) Both Nostrils three times a day PRN Dryness      PHYSICAL EXAMINATION:  General: NAD   Cardiology: S1/S2 with no murmur   Respiratory: CTA BL with no wheeze   GI: Soft and NTND  Extremities: JOSE L of BL UE/LE. RLE limited second to pain.   Neurology: Awake with no acute neurological deficits     LABS:                        8.6    17.21 )-----------( 420      ( 18 Feb 2025 07:16 )             24.5                           8.8    15.95 )-----------( 404      ( 19 Feb 2025 05:03 )             25.0       02-18    141  |  95[L]  |  76[H]  ----------------------------<  192[H]  3.4[L]   |  23  |  3.49[H]    Ca    9.6      18 Feb 2025 07:17  Mg     2.1     02-18      02-19    138  |  94[L]  |  84[H]  ----------------------------<  185[H]  4.3   |  23  |  4.07[H]    Ca    9.2      19 Feb 2025 05:03  Phos  4.6     02-19  Mg     2.1     02-19          MICROBIOLOGY:     RADIOLOGY:    CARDIOLOGY:

## 2025-02-19 NOTE — CONSULT NOTE ADULT - SUBJECTIVE AND OBJECTIVE BOX
Interventional Radiology    Evaluate for Procedure: non-tunneled catheter placement for HD    HPI: 63 YO F with PMHx of HFrEF 30-35 and grade 2 ddfxn (last TTE on 01/16/25), DM2, and diabetic foot ulcer. Recent admission at [facility] for ADHF. Patient now represents to OSH and ultimately to [facility] as a transfer for worsening right leg pain and fluid secretion. As per documentation, patient was reported to have severe depletion of RLE blood flow beyond the popliteal vessel at knee and similar findings in the left. Patient was transferred to [facility] for CO2 angiogram with Dr. Baltazar. Patient was found to have rising creatinine, IR is consulted for placement of a non-tunneled catheter for hemodialysis.     Allergies: No Known Allergies    Medications (Abx/Cardiac/Anticoagulation/Blood Products)    ampicillin/sulbactam  IVPB: 200 mL/Hr IV Intermittent (02-19 @ 11:28)  aspirin enteric coated: 81 milliGRAM(s) Oral (02-19 @ 11:29)  buMETAnide IVPB: 132 mL/Hr IV Intermittent (02-17 @ 13:57)  heparin   Injectable: 5000 Unit(s) SubCutaneous (02-19 @ 12:40)  milrinone Infusion: 2.7 mL/Hr IV Continuous (02-18 @ 11:13)  spironolactone: 50 milliGRAM(s) Oral (02-18 @ 05:03)    Data:    T(C): 36.7  HR: 83  BP: 107/68  RR: 18  SpO2: 96%    -WBC 15.95 / HgB 8.8 / Hct 25.0 / Plt 404  -Na 138 / Cl 94 / BUN 84 / Glucose 185  -K 4.3 / CO2 23 / Cr 4.07  -ALT -- / Alk Phos -- / T.Bili --  -INR 1.44 / PTT 43.7    Radiology:   Relevant imaging reviewed.    Assessment/Plan:   - 64y Female with rising serum creatinine. IR is consulted for placement of a non-tunneled catheter for hemodialysis.    - case reviewed and approved for 2/20/25  - please place IR procedure order under GERMAIN Lopez  - STAT labs in AM (cbc,coags, bmp, T&S)  - please hold morning heparin dose  - patient does not need to be NPO  - d/w primary team

## 2025-02-19 NOTE — PROGRESS NOTE ADULT - SUBJECTIVE AND OBJECTIVE BOX
Angio cancelled today. Cr up etc  DW'ed med attd who agrees.  Will fu  Poss angio next week if better.

## 2025-02-19 NOTE — PROGRESS NOTE ADULT - ASSESSMENT
64y Female with history of CHF presents as a transfer for LE CO2 angiogram. Nephrology consulted for elevated Scr.    1) JAMEL: likely due to ATN in addition to CRS. Scr continuing to increase, but unclear if this is due to an JAMEL or due to a decrease in volume overload. Monitor CMP daily. UA relatively bland. FeUrea low consistent with low flow state. Renal US unremarkable. Bladder scan on 2/3 negative. Avoid nephrotoxins. Need accurate measurements of intake and urinary output. s/p Cardiac MRI on 2/19. Per discussion with Internal Medicine and Cardiology, patient will benefit from improved kidney function before LLE angiogram can be performed. Obtained consent for HD from patient and her brothers. Will plan for a dialysis session on Thursday 2/20 after dialysis catheter is placed.    2) HTN: BP low normal. Monitor off anti-hypertensive medications.    3) LE edema: Not secondary to nephrotic syndrome given subnephrotic range proteinuria. Hold diuretics as noted above until Cardiac MRI can be performed. Continue milrinone gtt as per Cardiology. TTE with severely decreased LVSF. Monitor UO. Recommend checking CMP twice daily while on high doses of Bumex.    4) Hyperphosphatemia: Resolved. Continue with low phosphorus diet.     5. Hypernatremia: Due to receiving 3% hypertonic saline, has since resolved after holding on 2/17. Cardiology is providing this to augment urinary output, but I would deferring on this and using multiple di[facility]tics [facility] (after Cardiac MRI occurs, resume diuretic therapy).    Plan for hemodialysis on 2/20 after HD catheter is placed.    [facility] NEPHROLOGY  Raji Waterman M.D.  Mars Feliz D.O.  Kelley Parks M.D.  MD Nancy Kulkarni, MSN, ANP-C    Telephone: [phone]  Facsimile: [phone] [id number], [address]  [place] [id number]

## 2025-02-19 NOTE — PROGRESS NOTE ADULT - PROBLEM SELECTOR PLAN 1
- Check BG TID AC and HS while on PO diet  - Increase Lantus 9 units QHS  - Continue with Tradjenta  5 gm daily  - Prandial BGs  above goal, but Cr worsening and starting HD tomorrow. Would hold off starting meal coverage insulin for now. will start low dose Admelog 1 or 2 units with meals if prandial BGs remain > 180 tomorrow after HD initiation.   - C/w low dose Admelog correctional scales TID AC and HS  Discharge Planning:   - Likely basal/bolus regimen given kidney function (doses TBD closer to d/c). Not a candidate for SGLT2 due to leg ulcers and also significantly decreased EGFR., can consider GLP1 in addition to Basal/bolus> will need close f/u with Optho due to h/o retinopathy.   Can send Rx for ozempic 0.25mg weekly x 4weeks and increase to 0.50mg, or mounjaro 2.5mg 2.5mg x4 weeks, then increase to 5.0mg weekly. (Patient denies medullary thyroid cancer, hx of pancreatitis or MEN2)  - Please make sure patient has DM management supplies (glucometer, lancets, strips, alcohol pads. pen needles)  -Patient should check BGs before meals and at bedtime.  -Contact PCP/endocrinology if BG is less than 70 X1, greater than  400 X1 or persistently greater than 200s   - Patient should check BG TID AC and HS at home. Can use CGM if pt wishes.  - Endocrine follow up: can f/u with Dr. Grace, Endocrinology.   - Needs Optho/ renal/cardiac /podiatry and vascular follow up

## 2025-02-20 DIAGNOSIS — N17.9 ACUTE KIDNEY FAILURE, UNSPECIFIED: ICD-10-CM

## 2025-02-20 LAB
ALBUMIN SERPL ELPH-MCNC: 3.5 G/DL — SIGNIFICANT CHANGE UP (ref 3.3–5)
ALP SERPL-CCNC: 173 U/L — HIGH (ref 40–120)
ALT FLD-CCNC: 125 U/L — HIGH (ref 10–45)
ANION GAP SERPL CALC-SCNC: 23 MMOL/L — HIGH (ref 5–17)
APTT BLD: 34.6 SEC — SIGNIFICANT CHANGE UP (ref 24.5–35.6)
AST SERPL-CCNC: 109 U/L — HIGH (ref 10–40)
BILIRUB DIRECT SERPL-MCNC: 0.4 MG/DL — HIGH (ref 0–0.3)
BILIRUB INDIRECT FLD-MCNC: 0.5 MG/DL — SIGNIFICANT CHANGE UP (ref 0.2–1)
BILIRUB SERPL-MCNC: 0.9 MG/DL — SIGNIFICANT CHANGE UP (ref 0.2–1.2)
BLD GP AB SCN SERPL QL: NEGATIVE — SIGNIFICANT CHANGE UP
BUN SERPL-MCNC: 94 MG/DL — HIGH (ref 7–23)
CALCIUM SERPL-MCNC: 9.3 MG/DL — SIGNIFICANT CHANGE UP (ref 8.4–10.5)
CHLORIDE SERPL-SCNC: 93 MMOL/L — LOW (ref 96–108)
CO2 SERPL-SCNC: 21 MMOL/L — LOW (ref 22–31)
CREAT SERPL-MCNC: 4.76 MG/DL — HIGH (ref 0.5–1.3)
EGFR: 10 ML/MIN/1.73M2 — LOW
EGFR: 10 ML/MIN/1.73M2 — LOW
GLUCOSE BLDC GLUCOMTR-MCNC: 121 MG/DL — HIGH (ref 70–99)
GLUCOSE BLDC GLUCOMTR-MCNC: 127 MG/DL — HIGH (ref 70–99)
GLUCOSE BLDC GLUCOMTR-MCNC: 131 MG/DL — HIGH (ref 70–99)
GLUCOSE BLDC GLUCOMTR-MCNC: 190 MG/DL — HIGH (ref 70–99)
GLUCOSE BLDC GLUCOMTR-MCNC: 196 MG/DL — HIGH (ref 70–99)
GLUCOSE SERPL-MCNC: 179 MG/DL — HIGH (ref 70–99)
HBV SURFACE AG SER-ACNC: SIGNIFICANT CHANGE UP
HCT VFR BLD CALC: 25.3 % — LOW (ref 34.5–45)
HGB BLD-MCNC: 8.7 G/DL — LOW (ref 11.5–15.5)
INR BLD: 1.34 RATIO — HIGH (ref 0.85–1.16)
LACTATE SERPL-SCNC: 1.6 MMOL/L — SIGNIFICANT CHANGE UP (ref 0.5–2)
MAGNESIUM SERPL-MCNC: 2.2 MG/DL — SIGNIFICANT CHANGE UP (ref 1.6–2.6)
MCHC RBC-ENTMCNC: 24.9 PG — LOW (ref 27–34)
MCHC RBC-ENTMCNC: 34.4 G/DL — SIGNIFICANT CHANGE UP (ref 32–36)
MCV RBC AUTO: 72.5 FL — LOW (ref 80–100)
NRBC BLD AUTO-RTO: 1 /100 WBCS — HIGH (ref 0–0)
PHOSPHATE SERPL-MCNC: 5.7 MG/DL — HIGH (ref 2.5–4.5)
PLATELET # BLD AUTO: 383 K/UL — SIGNIFICANT CHANGE UP (ref 150–400)
POTASSIUM SERPL-MCNC: 4.4 MMOL/L — SIGNIFICANT CHANGE UP (ref 3.5–5.3)
POTASSIUM SERPL-SCNC: 4.4 MMOL/L — SIGNIFICANT CHANGE UP (ref 3.5–5.3)
PROT SERPL-MCNC: 8 G/DL — SIGNIFICANT CHANGE UP (ref 6–8.3)
PROTHROM AB SERPL-ACNC: 15.4 SEC — HIGH (ref 9.9–13.4)
RBC # BLD: 3.49 M/UL — LOW (ref 3.8–5.2)
RBC # FLD: 19.9 % — HIGH (ref 10.3–14.5)
RH IG SCN BLD-IMP: NEGATIVE — SIGNIFICANT CHANGE UP
SODIUM SERPL-SCNC: 137 MMOL/L — SIGNIFICANT CHANGE UP (ref 135–145)
WBC # BLD: 14.5 K/UL — HIGH (ref 3.8–10.5)
WBC # FLD AUTO: 14.5 K/UL — HIGH (ref 3.8–10.5)

## 2025-02-20 PROCEDURE — 99232 SBSQ HOSP IP/OBS MODERATE 35: CPT

## 2025-02-20 PROCEDURE — 77001 FLUOROGUIDE FOR VEIN DEVICE: CPT | Mod: 26

## 2025-02-20 PROCEDURE — 36556 INSERT NON-TUNNEL CV CATH: CPT

## 2025-02-20 PROCEDURE — 76937 US GUIDE VASCULAR ACCESS: CPT | Mod: 26

## 2025-02-20 RX ORDER — MILRINONE LACTATE 1 MG/ML
0.25 INJECTION, SOLUTION INTRAVENOUS
Qty: 20 | Refills: 0 | Status: DISCONTINUED | OUTPATIENT
Start: 2025-02-20 | End: 2025-03-06

## 2025-02-20 RX ORDER — HYDROMORPHONE/SOD CHLOR,ISO/PF 2 MG/10 ML
0.5 SYRINGE (ML) INJECTION ONCE
Refills: 0 | Status: DISCONTINUED | OUTPATIENT
Start: 2025-02-20 | End: 2025-02-20

## 2025-02-20 RX ADMIN — Medication 650 MILLIGRAM(S): at 16:58

## 2025-02-20 RX ADMIN — OXYCODONE HYDROCHLORIDE 5 MILLIGRAM(S): 30 TABLET ORAL at 05:45

## 2025-02-20 RX ADMIN — OXYCODONE HYDROCHLORIDE 5 MILLIGRAM(S): 30 TABLET ORAL at 17:32

## 2025-02-20 RX ADMIN — MILRINONE LACTATE 5.41 MICROGRAM(S)/KG/MIN: 1 INJECTION, SOLUTION INTRAVENOUS at 13:19

## 2025-02-20 RX ADMIN — Medication 50 MILLILITER(S): at 19:46

## 2025-02-20 RX ADMIN — Medication 650 MILLIGRAM(S): at 17:31

## 2025-02-20 RX ADMIN — INSULIN LISPRO 1: 100 INJECTION, SOLUTION INTRAVENOUS; SUBCUTANEOUS at 08:01

## 2025-02-20 RX ADMIN — ATORVASTATIN CALCIUM 40 MILLIGRAM(S): 80 TABLET, FILM COATED ORAL at 21:53

## 2025-02-20 RX ADMIN — Medication 1 APPLICATION(S): at 11:57

## 2025-02-20 RX ADMIN — OXYCODONE HYDROCHLORIDE 5 MILLIGRAM(S): 30 TABLET ORAL at 06:15

## 2025-02-20 RX ADMIN — Medication 40 MILLIGRAM(S): at 11:45

## 2025-02-20 RX ADMIN — MUPIROCIN CALCIUM 1 APPLICATION(S): 20 CREAM TOPICAL at 18:17

## 2025-02-20 RX ADMIN — OXYCODONE HYDROCHLORIDE 5 MILLIGRAM(S): 30 TABLET ORAL at 17:09

## 2025-02-20 RX ADMIN — MUPIROCIN CALCIUM 1 APPLICATION(S): 20 CREAM TOPICAL at 05:35

## 2025-02-20 RX ADMIN — INSULIN GLARGINE-YFGN 9 UNIT(S): 100 INJECTION, SOLUTION SUBCUTANEOUS at 22:07

## 2025-02-20 RX ADMIN — MILRINONE LACTATE 2.7 MICROGRAM(S)/KG/MIN: 1 INJECTION, SOLUTION INTRAVENOUS at 08:03

## 2025-02-20 RX ADMIN — MILRINONE LACTATE 5.41 MICROGRAM(S)/KG/MIN: 1 INJECTION, SOLUTION INTRAVENOUS at 22:07

## 2025-02-20 RX ADMIN — Medication 0.5 MILLIGRAM(S): at 08:47

## 2025-02-20 RX ADMIN — INSULIN LISPRO 1: 100 INJECTION, SOLUTION INTRAVENOUS; SUBCUTANEOUS at 11:58

## 2025-02-20 RX ADMIN — Medication 81 MILLIGRAM(S): at 11:45

## 2025-02-20 RX ADMIN — AMPICILLIN SODIUM AND SULBACTAM SODIUM 200 GRAM(S): 1; .5 INJECTION, POWDER, FOR SOLUTION INTRAMUSCULAR; INTRAVENOUS at 21:51

## 2025-02-20 RX ADMIN — OXYCODONE HYDROCHLORIDE 5 MILLIGRAM(S): 30 TABLET ORAL at 22:49

## 2025-02-20 RX ADMIN — MILRINONE LACTATE 5.41 MICROGRAM(S)/KG/MIN: 1 INJECTION, SOLUTION INTRAVENOUS at 16:59

## 2025-02-20 RX ADMIN — AMPICILLIN SODIUM AND SULBACTAM SODIUM 200 GRAM(S): 1; .5 INJECTION, POWDER, FOR SOLUTION INTRAMUSCULAR; INTRAVENOUS at 10:53

## 2025-02-20 RX ADMIN — Medication 2 TABLET(S): at 21:51

## 2025-02-20 RX ADMIN — HEPARIN SODIUM 5000 UNIT(S): 1000 INJECTION INTRAVENOUS; SUBCUTANEOUS at 21:50

## 2025-02-20 RX ADMIN — OXYCODONE HYDROCHLORIDE 5 MILLIGRAM(S): 30 TABLET ORAL at 21:49

## 2025-02-20 RX ADMIN — LINAGLIPTIN 5 MILLIGRAM(S): 5 TABLET, FILM COATED ORAL at 11:45

## 2025-02-20 NOTE — PROGRESS NOTE ADULT - ASSESSMENT
64y.o. Female with PAD, CLTI affecting b/l LE, CHF, chronic b/l LE wound R worsen then the L, with R foot blistering and ulceration was transfer to [facility] for CO2 angiogram. Currently patient Cr is elevated, not medically optimized for the procedure. Patient is currently followed by medicine and cardiology.     Recommendations:  - pending optimization for angio next week due to uptrending Cr  - Continue ASA/statin  - cards/meds clearence for CO2 angio   - Consented  - Vascular following    Vascular Surgery  [id number]

## 2025-02-20 NOTE — PROGRESS NOTE ADULT - ASSESSMENT
63y/o F w/h/o uncontrolled T2DM (A1C 11.6%) on Tresiba and CeQur insulin patch PTA. DM c/b PAD, retinopathy, CKD, CAD. Also h/oType 2 DM, HTN, HLD. Presented to OSH with worsening right leg pain and fluid secretion. Transferred to [facility] for CO2 angiogram. Found to have Low EF to 20% in OSBALDO. Endocrine consulted for Type 2 DM management. BG Goal 100-180mg/dl   LLE angio was cancelled on 2/18, Had non-tunneled catheter for hemodialysis placed and will start on HD. Last 24 hour BGs in 190-200s with serum fasting .   Will re-evaluate BGs after patient has dialysis and will determine if meal coverage insulin is needed. Would continue to monitor off standing meal coverage insulin for now        Home regimen: Tresiba 40 units, CeQur insulin patch about 2-10 units TID with meals

## 2025-02-20 NOTE — PROGRESS NOTE ADULT - ASSESSMENT
65 YO F with PMHx of HFrEF 30-35 and grade 2 ddfxn (last TTE on 01/16/25), DM2, and diabetic foot ulcer. Recent admission at [facility] for ADHF. Patient now represents to OSH and ultimately to [facility] as a transfer for worsening right leg pain and fluid secretion. As per documentation, patient was reported to have severe depletion of RLE blood flow beyond the popliteal vessel at knee and similar findings in the left. Patient was transferred to [facility] for CO2 angiogram with Dr. Baltazar. Of note, patient also with reported visual disturbance for which patient was seen and evaluated by opthalmology. Internal Medicine has been consulted on Ms. Kirby's care for medical management.     # ADHF/ BiV HFrEF 20   - Recent admission at [facility] for ADHF and on dobutamine outpatient  - TTE 1/16 with EF 30-35 with moderately reduced LVSF and grade 2 ddfxn, normal RVSF , trace TR/ CO, and trace pericardial effusion  - NST abnormal with small-sized, moderate defect in apical wall that is predominantly fixed suggestive of an infarction with minimal marcus-infarct ischemia. Post stress LVEF 25.  - CT Chest with small BL pleural effusions and small pericardial effusion.  - RPT TTE with EF 20, severely decreased LV, decreased RVSF with TAPSE 1.2, and regional wall motion abnormalities present with entire septum, entire apex, entire inferior wall, mid inferolateral segment, and mid anterolateral segment are hypokinetic.   - RHC with RA 20, PA 49/29 (40), PCWP 35, mVO2 51.1, CO/CI 3.8/2.2, SVR 1095 w/ Multivessel CAD   - s/p zaroxyln 10 QD  - Continue on milrinone 0.125mcg GTT (decreased 2/18) with aldactone for now  - CRE and sodium rising and bumex GTT stopped 2/18 and HTS stopped 2/16   - RPT limited TTE w/ LVSF  severely decreased, Reduced RVSF, LVOT VTI 12.0cm, Mild to mod TR, mildly elevated right atrial pressure  - HF following and adjusting medications   - Case discussed with EP and needs to be on GDMT for 3 months before AICD placement and should be stabilized off of inotropic support.   - Monitor I and O, diuresis per Cardio/ Renal    - GDMT as per Cardio  - Serial CXR   - Monitor volume status   - Check daily weights    - Monitor on telemetry; Monitor electrolytes   - Cardio, HF, Renal, and EP evals appreciated; F/u recs     # CAD with TVD   - [facility] with RCA , severe ostial LM disease, diffuse disease in LAD and LCx, some L to R collaterals (Multivessel CAD)  - Case discussed with CTSx and NOT a candidate for CABG due to comorbidities  - Cardiac MRI done F/u Cardio   - AS and Statin   - Cardiology following     # BL LE Edema likely in setting of ADHF  - Diuresis as per Cardio, HF and Renal   - BL LE elevation and compression  - LE Duplex neg   - Monitor for now  - Podiatry and Vascular evals appreciated; F/u recs    # Pericardial effusion likely in setting ADHF  - TTE with trace pericardial effusion   - CT Chest with small pericardial effusion   - Denies chest pain, palpitations, chest tightness or discomfort, shortness of breath or dyspnea   - Repeat TTE in 1 month for further evaluation   - Diuresis per cardio/ renal.  - Monitor on telemetry  - Cardio following    # Pleural effusion likely in setting ADHF  - CT Chest with small BL pleural effusions  - On RA with no respiratory complaints at present  - Monitor O2 saturation  - Supplement to maintain > 90%   - Diuresis per cardio/ renal.     # JAMEL with Hyponatremia and Metabolic Acidosis   - Baseline CRE 0.7-1.2 and increased to ~4  - As per OSH documentation, JAMEL was thought to be second to Unasyn/ Bumex / Entresto use and Hypotension   - US RENAL with no hydronephrosis  - CRE improved slightly with diuresis/ inotropic support and likely cardiorenal induced with low flow state in ADHF, however now rising again and concern for milrinone vs overdiuresis (prerenal) vs cardiorenal.   - Milrinone adjusted as above and diuresis remains off per HF .   - Remains nonoliguric, S/P Shiley 2/20 w/ IR and initiation of HD .  - HF following and adjusting medications.    - Continue with HF support as above  - Avoid nephrotoxic agents  - Monitor Cr and daily BMP   - Renal eval appreciated    # Transaminitis  - Likely 2/2 hepatic congestion   - Volume removal with HD as tolerated  - Trend LFTs, if uptrend post-HD initiation, check ABD US  - Serial ABD Examinations    # Retention   - Pt with + bladder scan   - Check additional scan  - Place stiles as per protocol if +     # Hypernatremia   - Hypernatremia likely from HTS vs over diuresis/ intravascular dehydraiton   - Hold further HTS   - Sodium improved   - Monitor sodium     # Leukocytosis likely in setting of BL LE ulcers with pop occlusion   - BCx negative   - UCx with probable contamination   - On unasyn for ABX. Frequency increased to Q12 as per Renal   - Leukocytosis fluctuating, however appears nontoxic with no fever spikes and monitoring closely on below unasyn.   - If febrile check pan cultures   - Trend CBC, temp curve, VS and adjust as tolerated    # Foot ulcers with worsening RLE pain second to pop artery occlusion +/- diabetic ulcers   - RLE Arterial Duplex with popliteal artery occlusion   - LLE Arterial Duplex with underlying disease cannot be excluded   - Patient transferred to [facility] for CO2 angiogram, however given ADHF currently not medically optimized and high risk. Plan to continue on diuresis and inotropic support as above   - Continue on Lipitor  - Was on Heparin GTT for now and now on HSQ  - Continue on unasyn (2/6 - ) for now and will need to discuss end date/ duration of ABX based upon angiogram results vs future procedures.   - Given worsened renal fxn/ volume status CO2 angio on hold.  - Continue pain control with oxy  - Wound care called for dressing recs   - Vascular following     # Tachycardia likely pain related   - On Toprol and improved  - Continue to monitor on tele   - Cardio eval appreciated    # Visual Disturbance  - CT Head w/ old infarcts  - On ASA and Statin   - Opthalmology eval appreciated    # Indigestion and Constipation   - PPI, miralax and senna continued  - Monitor BMs    # Anemia likely mixed AOCD vs iron deficiency   - HH stable in 9s on HSQ  - Consider iron tabs when optimized   - Monitor HH   - Transfuse for Hgb < 8  - Maintain active T/S    # Diabetes Mellitus A1C 11.6   - Continue on lantus 10 with tradjecta 5 and ISS   - Diabetic DASH diet   - Monitor and adjust glucose levels PRN   - Endocrine following; F/u recs     # HLD and HLD  - Continue on lipitor and toprol  - Monitor BP    # DISPO TBD      Discussed with Attending and ACP

## 2025-02-20 NOTE — PROGRESS NOTE ADULT - SUBJECTIVE AND OBJECTIVE BOX
Patient seen and examined at bedside.    Overnight Events: rising creatinine    REVIEW OF SYSTEMS:  CONSTITUTIONAL: No weakness, fevers or chills  EYES/ENT: No visual changes;  No dysphagia  NECK: No pain or stiffness  RESPIRATORY: No cough, wheezing, hemoptysis; No shortness of breath  CARDIOVASCULAR: No chest pain or palpitations; No lower extremity edema  GASTROINTESTINAL: No abdominal or epigastric pain. No nausea, vomiting, or hematemesis; No diarrhea or constipation. No melena or hematochezia.  BACK: No back pain  GENITOURINARY: No dysuria, frequency or hematuria  NEUROLOGICAL: No numbness or weakness  SKIN: No itching, burning, rashes, or lesions   All other review of systems is negative unless indicated above.            Current Meds:  acetaminophen     Tablet .. 650 milliGRAM(s) Oral every 6 hours PRN  ampicillin/sulbactam  IVPB 3 Gram(s) IV Intermittent every 12 hours  ampicillin/sulbactam  IVPB      aspirin enteric coated 81 milliGRAM(s) Oral daily  atorvastatin 40 milliGRAM(s) Oral at bedtime  benzocaine/menthol Lozenge 1 Lozenge Oral once  bisacodyl 5 milliGRAM(s) Oral every 12 hours  chlorhexidine 4% Liquid 1 Application(s) Topical <User Schedule>  dextrose 5%. 1000 milliLiter(s) IV Continuous <Continuous>  dextrose 5%. 1000 milliLiter(s) IV Continuous <Continuous>  dextrose 50% Injectable 25 Gram(s) IV Push once  dextrose 50% Injectable 12.5 Gram(s) IV Push once  dextrose 50% Injectable 25 Gram(s) IV Push once  dextrose Oral Gel 15 Gram(s) Oral once PRN  glucagon  Injectable 1 milliGRAM(s) IntraMuscular once  heparin   Injectable 5000 Unit(s) SubCutaneous every 8 hours  heparin Lock (1,000 Units/mL) Injectable 1 Dose(s) Catheter once  heparin Lock (1,000 Units/mL) Injectable 1 Dose(s) Catheter once  insulin glargine Injectable (LANTUS) 9 Unit(s) SubCutaneous at bedtime  insulin lispro (ADMELOG) corrective regimen sliding scale   SubCutaneous at bedtime  insulin lispro (ADMELOG) corrective regimen sliding scale   SubCutaneous three times a day before meals  linagliptin 5 milliGRAM(s) Oral daily  melatonin 3 milliGRAM(s) Oral at bedtime PRN  milrinone Infusion 0.125 MICROgram(s)/kG/Min IV Continuous <Continuous>  mupirocin 2% Ointment 1 Application(s) Both Nostrils two times a day  ondansetron Injectable 4 milliGRAM(s) IV Push every 6 hours PRN  oxyCODONE    IR 5 milliGRAM(s) Oral every 4 hours PRN  pantoprazole  Injectable 40 milliGRAM(s) IV Push daily  polyethylene glycol 3350 17 Gram(s) Oral daily  senna 2 Tablet(s) Oral at bedtime  sodium chloride 0.65% Nasal 1 Spray(s) Both Nostrils three times a day PRN  sodium chloride 0.9% lock flush 10 milliLiter(s) IV Push every 1 hour PRN      Vitals:  T(F): 97.7 (02-20), Max: 98.5 (02-20)  HR: 98 (02-20) (98 - 107)  BP: 111/69 (02-20) (98/63 - 117/72)  RR: 18 (02-20)  SpO2: 94% (02-20)  I&O's Summary    19 Feb 2025 07:01  -  20 Feb 2025 07:00  --------------------------------------------------------  IN: 232.4 mL / OUT: 550 mL / NET: -317.6 mL        Physical Exam:  GEN: NAD  HEENT: EOMI, clear sclera  PULM: CTA b/l, no wheeze  CV: RRR S1 S2, no murmur, no JVD  ABD: S, NT, ND  EXT: WWP, no edema  PSYCH: normal affect  SKIN: No rash                          8.7    14.50 )-----------( 383      ( 20 Feb 2025 07:12 )             25.3     02-20    137  |  93[L]  |  94[H]  ----------------------------<  179[H]  4.4   |  21[L]  |  4.76[H]    Ca    9.3      20 Feb 2025 07:12  Phos  5.7     02-20  Mg     2.2     02-20    TPro  8.0  /  Alb  3.5  /  TBili  0.9  /  DBili  0.4[H]  /  AST  109[H]  /  ALT  125[H]  /  AlkPhos  173[H]  02-20    PT/INR - ( 20 Feb 2025 07:12 )   PT: 15.4 sec;   INR: 1.34 ratio         PTT - ( 20 Feb 2025 07:12 )  PTT:34.6 sec

## 2025-02-20 NOTE — PROVIDER CONTACT NOTE (OTHER) - ASSESSMENT
Patient is A&Ox2-3, no changes in mental status, no dizziness, no distress, VSS, see in flow sheets.

## 2025-02-20 NOTE — PROGRESS NOTE ADULT - ASSESSMENT
65 y/o F with PMH of CHF (last TTE 1/2025 with EF 30-35%), DM, diabetic foot ulcer, PAD, CAD. Recent admission at [facility] for CHF exacerbation, presented to [facility] as a transfer for worsening R leg pain. Hospital course c/b acute on chronic CHF - TTE with EF 20%, severely decreased LV and RV function, multiple regional motion abnormalities, s/p LHC revealing TVD, pleural/pericardial effusions, JAMEL, leukocytosis suspected to be in the setting of LE wound on IV ABX, persistent RLE pain w/ RLE Arterial Duplex revealing popliteal artery occlusion  Plan for eventual angiogram with vascular when medically optimized. Pulmonary called to consult as pt with c/o SOB.

## 2025-02-20 NOTE — PROGRESS NOTE ADULT - SUBJECTIVE AND OBJECTIVE BOX
Name of Patient : TOMASZ ALBA  MRN: [id number]  Date of visit: 25 @ 10:21      Subjective: Patient seen and examined. No new events except as noted.   Patient lying down in bed. For Shiley placement today   With urinary retention on bladder scan     REVIEW OF SYSTEMS:    CONSTITUTIONAL: Generalized weakness, No fevers or chills  EYES/ENT: No visual changes;  No vertigo or throat pain   NECK: No pain or stiffness  RESPIRATORY: No cough, wheezing, hemoptysis; No shortness of breath  CARDIOVASCULAR: No chest pain or palpitations  GASTROINTESTINAL: No abdominal or epigastric pain. No nausea, vomiting, or hematemesis; No diarrhea or constipation. No melena or hematochezia.  GENITOURINARY: +retention   NEUROLOGICAL: No numbness or weakness  SKIN/ MSK; B/L LE wounds; Pain controlled   All other review of systems is negative unless indicated above.    MEDICATIONS:  MEDICATIONS  (STANDING):  ampicillin/sulbactam  IVPB 3 Gram(s) IV Intermittent every 12 hours  ampicillin/sulbactam  IVPB      aspirin enteric coated 81 milliGRAM(s) Oral daily  atorvastatin 40 milliGRAM(s) Oral at bedtime  benzocaine/menthol Lozenge 1 Lozenge Oral once  bisacodyl 5 milliGRAM(s) Oral every 12 hours  chlorhexidine 4% Liquid 1 Application(s) Topical <User Schedule>  dextrose 5%. 1000 milliLiter(s) (100 mL/Hr) IV Continuous <Continuous>  dextrose 5%. 1000 milliLiter(s) (50 mL/Hr) IV Continuous <Continuous>  dextrose 50% Injectable 25 Gram(s) IV Push once  dextrose 50% Injectable 12.5 Gram(s) IV Push once  dextrose 50% Injectable 25 Gram(s) IV Push once  glucagon  Injectable 1 milliGRAM(s) IntraMuscular once  heparin   Injectable 5000 Unit(s) SubCutaneous every 8 hours  heparin Lock (1,000 Units/mL) Injectable 1 Dose(s) Catheter once  heparin Lock (1,000 Units/mL) Injectable 1 Dose(s) Catheter once  insulin glargine Injectable (LANTUS) 9 Unit(s) SubCutaneous at bedtime  insulin lispro (ADMELOG) corrective regimen sliding scale   SubCutaneous three times a day before meals  insulin lispro (ADMELOG) corrective regimen sliding scale   SubCutaneous at bedtime  linagliptin 5 milliGRAM(s) Oral daily  milrinone Infusion 0.25 MICROgram(s)/kG/Min (5.41 mL/Hr) IV Continuous <Continuous>  mupirocin 2% Ointment 1 Application(s) Both Nostrils two times a day  pantoprazole  Injectable 40 milliGRAM(s) IV Push daily  polyethylene glycol 3350 17 Gram(s) Oral daily  senna 2 Tablet(s) Oral at bedtime  sodium chloride 0.9%. 250 milliLiter(s) (50 mL/Hr) IV Continuous <Continuous>      PHYSICAL EXAM:  T(C): 36.4 (25 @ 14:00), Max: 36.9 (25 @ 00:08)  HR: 102 (25 @ 14:00) (95 - 107)  BP: 103/62 (25 @ 14:00) (103/62 - 117/72)  RR: 22 (25 @ 14:00) (17 - 22)  SpO2: 92% (25 @ 14:00) (92% - 95%)  Wt(kg): --  I&O's Summary    2025 07:01  -  2025 07:00  --------------------------------------------------------  IN: 232.4 mL / OUT: 550 mL / NET: -317.6 mL    2025 07:  -  2025 16:21  --------------------------------------------------------  IN: 0 mL / OUT: 0 mL / NET: 0 mL        Appearance: Awake, lying down in bed 	  HEENT:  Eyes are open   Lymphatic: No lymphadenopathy grossly   Cardiovascular: Normal    Respiratory: normal effort , clear  Gastrointestinal:  Soft, Non-tender  Skin: No rashes,  warm to touch  Psychiatry:  Mood & affect appropriate  Musculoskeletal: B/L LE Foot wounds; Wrapped in dressing; + Edema         25 @ 07:25 @ 07:00  --------------------------------------------------------  IN: 232.4 mL / OUT: 550 mL / NET: -317.6 mL    25 @ 07:01  -  25 @ 16:21  --------------------------------------------------------  IN: 0 mL / OUT: 0 mL / NET: 0 mL                                8.7    14.50 )-----------( 383      ( 2025 07:12 )             25.3               02    137  |  93[L]  |  94[H]  ----------------------------<  179[H]  4.4   |  21[L]  |  4.76[H]    Ca    9.3      2025 07:12  Phos  5.7       Mg     2.2         TPro  8.0  /  Alb  3.5  /  TBili  0.9  /  DBili  0.4[H]  /  AST  109[H]  /  ALT  125[H]  /  AlkPhos  173[H]  0220    PT/INR - ( 2025 07:12 )   PT: 15.4 sec;   INR: 1.34 ratio         PTT - ( 2025 07:12 )  PTT:34.6 sec                   Urinalysis Basic - ( 2025 07:12 )    Color: x / Appearance: x / SG: x / pH: x  Gluc: 179 mg/dL / Ketone: x  / Bili: x / Urobili: x   Blood: x / Protein: x / Nitrite: x   Leuk Esterase: x / RBC: x / WBC x   Sq Epi: x / Non Sq Epi: x / Bacteria: x      < from: TTE Limited W or WO Ultrasound Enhancing Agent (25 @ 12:39) >  CONCLUSIONS:      1. Limited TTE to assess for MR, IVC, LVOT, TR.   2. Left ventricular systolic function is severely decreased.   3. Reduced right ventricular systolic function.   4. LVOT VTI 12.0cm, Stroke volume 35cc. LVOT diameter 1.9cm.   5. Mild to moderate tricuspid regurgitation.   6. Estimated pulmonary artery systolic pressure is 32 mmHg, consistent with normal pulmonary artery pressure.   7. The inferior vena cava is normal in size measuring 1.70 cm in diameter, (normal <2.1cm) with abnormal inspiratory collapse (abnormal <50%) consistent with mildly elevated right atrial pressure (~8, range 5-10mmHg).   8. Compared to the transthoracic echocardiogram performed on 2/10/2025, RV and LV function still .    < end of copied text >

## 2025-02-20 NOTE — PROGRESS NOTE ADULT - SUBJECTIVE AND OBJECTIVE BOX
SURGERY DAILY PROGRESS NOTE    24 Hour/Overnight Events: No acute events overnight    SUBJECTIVE: Patient seen and evaluated on AM rounds.  ------------------------------------------------------------------------------------------------------------  OBJECTIVE:  Vital Signs Last 24 Hrs  T(C): 36.5 (20 Feb 2025 10:02), Max: 36.9 (20 Feb 2025 00:08)  T(F): 97.7 (20 Feb 2025 10:02), Max: 98.5 (20 Feb 2025 00:08)  HR: 98 (20 Feb 2025 10:02) (98 - 107)  BP: 111/69 (20 Feb 2025 10:02) (98/63 - 117/72)  BP(mean): --  RR: 18 (20 Feb 2025 10:02) (17 - 18)  SpO2: 94% (20 Feb 2025 10:02) (93% - 96%)    Parameters below as of 20 Feb 2025 10:02  Patient On (Oxygen Delivery Method): room air      I&O's Detail    19 Feb 2025 07:01  -  20 Feb 2025 07:00  --------------------------------------------------------  IN:    Milrinone: 32.4 mL    Oral Fluid: 200 mL  Total IN: 232.4 mL    OUT:    Intermittent Catheterization - Urethral (mL): 550 mL    Voided (mL): 0 mL  Total OUT: 550 mL    Total NET: -317.6 mL          LABS:                        8.7    14.50 )-----------( 383      ( 20 Feb 2025 07:12 )             25.3     02-20    137  |  93[L]  |  94[H]  ----------------------------<  179[H]  4.4   |  21[L]  |  4.76[H]    Ca    9.3      20 Feb 2025 07:12  Phos  5.7     02-20  Mg     2.2     02-20    TPro  8.0  /  Alb  3.5  /  TBili  0.9  /  DBili  0.4[H]  /  AST  109[H]  /  ALT  125[H]  /  AlkPhos  173[H]  02-20    LIVER FUNCTIONS - ( 20 Feb 2025 07:12 )  Alb: 3.5 g/dL / Pro: 8.0 g/dL / ALK PHOS: 173 U/L / ALT: 125 U/L / AST: 109 U/L / GGT: x           PT/INR - ( 20 Feb 2025 07:12 )   PT: 15.4 sec;   INR: 1.34 ratio         PTT - ( 20 Feb 2025 07:12 )  PTT:34.6 sec  Urinalysis Basic - ( 20 Feb 2025 07:12 )    Color: x / Appearance: x / SG: x / pH: x  Gluc: 179 mg/dL / Ketone: x  / Bili: x / Urobili: x   Blood: x / Protein: x / Nitrite: x   Leuk Esterase: x / RBC: x / WBC x   Sq Epi: x / Non Sq Epi: x / Bacteria: x      Physical Exam:  General: NAD, resting in bed comfortably  Cardiac: Regular rate, normotensive  Respiratory: Nonlabored respirations, normal cw expansion, on RA  Neuro: AOx3, CNII-XII intact on gross examination  Vascular/Extremities: Warm, well perfused        RLE: Dressings in place, blistering and ulceration on the dorsal aspect of the foot        LLE: First digit dry gangrene on the plantar aspect

## 2025-02-20 NOTE — PROGRESS NOTE ADULT - SUBJECTIVE AND OBJECTIVE BOX
[facility] NEPHROLOGY- PROGRESS NOTE    64y Female with history of CHF presents as a transfer for LE CO2 angiogram. Nephrology consulted for elevated Scr.    Still reports pain in her lower extremities. Unsure if she has complete comprehension of current medical situation. Spoke to all 3 of the patient's brothers alongside the patient and discussed plans to perform dialysis. Dialysis will aid in improving kidney function, which will allo angiogram and other cardiac interventions to be performed.    REVIEW OF SYSTEMS:  Gen: no fevers  Cards: no chest pain  Resp: + dyspnea with exertion, + cough  GI: no nausea and vomiting, no diarrhea  Vascular: Stable LE edema.  Msk: Persistent pain upon moving legs.    No Known Allergies      Hospital Medications: Medications reviewed    VITALS:  T(F): 97.7 (02-20-25 @ 10:02), Max: 98.5 (02-20-25 @ 00:08)  HR: 98 (02-20-25 @ 10:02)  BP: 111/69 (02-20-25 @ 10:02)  RR: 18 (02-20-25 @ 10:02)  SpO2: 94% (02-20-25 @ 10:02)  Wt(kg): --    02-19 @ 07:01  -  02-20 @ 07:00  --------------------------------------------------------  IN: 232.4 mL / OUT: 550 mL / NET: -317.6 mL        PHYSICAL EXAM:    Gen: NAD, calm  Cards: RRR, +S1/S2, no M/G/R  Resp: bibasilar rales  GI: soft, NT/ND, NABS  Vascular: 2+ RLE edema > LLE edema with legs wrapped    LABS:  02-20    137  |  93[L]  |  94[H]  ----------------------------<  179[H]  4.4   |  21[L]  |  4.76[H]    Ca    9.3      20 Feb 2025 07:12  Phos  5.7     02-20  Mg     2.2     02-20    TPro  8.0  /  Alb  3.5  /  TBili  0.9  /  DBili  0.4[H]  /  AST  109[H]  /  ALT  125[H]  /  AlkPhos  173[H]  02-20    Creatinine Trend: 4.76 <--, 4.07 <--, 3.49 <--, 2.93 <--, 2.95 <--, 2.81 <--, 2.80 <--, 2.74 <--, 2.40 <--, 2.39 <--, 2.37 <--                        8.7    14.50 )-----------( 383      ( 20 Feb 2025 07:12 )             25.3     Urine Studies:  Urinalysis Basic - ( 20 Feb 2025 07:12 )    Color:  / Appearance:  / SG:  / pH:   Gluc: 179 mg/dL / Ketone:   / Bili:  / Urobili:    Blood:  / Protein:  / Nitrite:    Leuk Esterase:  / RBC:  / WBC    Sq Epi:  / Non Sq Epi:  / Bacteria:         RADIOLOGY AND ADDITIONAL STUDIES:      < from: MR Cardiac w/wo IV Cont (02.19.25 @ 09:44) >  IMPRESSION:.    The left ventricle demonstrates severe wall motion abnormalities and   function is depressed (calculated LVEF is 25%).    There is greater than 75% subendocardial scarring involving the basal to   mid inferior wall of the left ventricle.    There is left ventricular transmural scarring involving the apical septal   wall and likely near transmural scarring of the apical lateral wall.    There is about 50% scarring of the left ventricular basal inferoseptal   wall.    < end of copied text >    < from: Xray Chest 1 View- PORTABLE-Routine (Xray Chest 1 View- PORTABLE-Routine .) (02.17.25 @ 22:49) >  Frontal expiratory view of the chest shows the heart to be similar in   size. The lungs show progression of pulmonary congestion with small right   effusion and there is no evidence of pneumothorax nor definite left   pleural effusion.    < end of copied text >    < from: US Kidney and Bladder (01.30.25 @ 12:47) >  FINDINGS:  Right kidney: 11.7 cm. No renal mass, hydronephrosis or calculi.    Left kidney: 10.9 cm. No renal mass, hydronephrosis or calculi.    Urinary bladder: Underdistended.    Miscellaneous: Bilateral pleural effusion.    < end of copied text >      < from: TTE W or WO Ultrasound Enhancing Agent (02.10.25 @ 12:09) >  CONCLUSIONS:      1. Left ventricular cavity is mildly dilated. Left ventricular systolic function is severely decreased with an ejection fraction of 20 % by Winchester's method of disks. Regional wall motion abnormalities present.   2. Multiple segmental abnormalities exist. See findings.   3. Reduced right ventricular systolic function.   4. No pericardial effusion seen.   5. Compared to the transthoracic echocardiogram performed on 1/16/2025, Worsening of the left ventricular function.    < end of copied text >

## 2025-02-20 NOTE — PROGRESS NOTE ADULT - ASSESSMENT
64F, hx of CHF (last TTE on 1/16/25 EF 30-35%, G2DD), DM, diabetic foot ulcer with a recent admission at [facility] for CHF exacerbation 1/14-1/17 p/w worsening R. leg pain and fluid secretion, was meeting sepsis criteria with podiatry having low suspicion for right cellulitis and admitted for continued management of HF exacerbation and needing ischemic eval.     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  -Left leg Arterial Duplex: Slightly tardus parvus waveform of the left popliteal artery is noted, for which underlying disease cannot be excluded. Posterior tibial artery waveform is nonpulsatile. Anterior tibial artery tardus parvus flow is noted.   Transferred to [facility] for CO2 angiogram as per vascular surgery    #  PAD Wound of lower extremity.   ·  Plan: Podiatry following, b/l serous bullae, no concerns for infection  Found to have RLE coolness  -Right Leg Arterial Duplex: Popliteal artery is occluded with negligible flow of right trifurcation arteries.  -Left leg Arterial Duplex: Slightly tardus parvus waveform of the left popliteal artery is noted, for which underlying disease cannot be excluded. Posterior tibial artery waveform is nonpulsatile. Anterior tibial artery tardus parvus flow is noted.  -Started on heparin gtt and dobutamine gtt -Will transfer to [facility] for CO2 angiogram as per vascular surgery as not candidate for CTA due to worsening SCr  -Keep compression dressing  -LE elevation above heart level at rest.  -Transferred to [facility] for CO2 angiogram   - Overall this patient is at   intermediate  risk (for cardiac death, nonfatal myocardial infarction, and nonfatal cardiac arrest perioperatively for this intermediate  risk procedure).   No cardiac contraindications for CO2 vangiogram  There  are  no further recommendation for risk stratifying imaging/stress testing prior to planned surgery  Seen by Podiatry-No acute pod intervention, local wound care only- Pod stable for discharge   PAD with rest ischemia plan for Angiogram Wednesday-Dr Louis No cardiac contraindications for procedding with CO2 Anfgiogram        # HFrEF (congestive heart failure). Ischemic Cardiomyopathy  ·  Plan: Hx of HF, previous admission in January, on home Lasix 40 qD, Metoprolol Succ 25 qD. Not on Entresto due to insurance issues  Last TTE: LVSF moderately decreased w/ EF 30-35 %. Moderate G2DD. Mild MR  Stress test: small-sized, moderate defect(s) in the apical wall that is predominantly fixed suggestive of an infarction with minimal marcus-infarct ischemia  Ischemic cardiomyopathy  GDMT on hold 2/2 JAMEL    Repeat TTE EF 20% with WMA  Likely Ischemic cardiomyopathy despite NST revealing fixed defectLHC confirming  Multivessel CAD        RHC: RA 20, PA 49/29 (40), PCWP 35, mVO2 51.1, CO/CI 3.8/2.2, SVR 1095    # CAD  LHC-RCA , severe ostial LM disease, diffuse disease in LAD and LCx, some L to R collaterals-seen by CTS advise cMR for viability poor target vessels for CABG-?High risk LM/LAD PCI  Had cMR-LVEF is 25%, greater than 75% subendocardial scarring involving the basal to mid inferior wall of the left ventricle, left ventricular transmural scarring involving the apical septal wall and likely near transmural scarring of the apical lateral wall. 50% scarring of the left ventricular basal inferoseptal wall.  Will need High risk PCI vs CABG though less likely 2/2 poor target vessel      Started on Milrinone to assist augmented diuresis in attempt to avoid further renal failure  Has lost Weight 170---> 164 Kg---> 161.1 Kg---> 165 Kg  Creatinine gradually rising  Diuretic holiday  For HD catheter placement to initiate Dialysis      LE EDEMA no DVT      # JAMEL  Creatinine Trend: 4.07<--, 3.49<--, 2.93<--2.95 <--2.81<--, 2.80<--, 2.74<--2.40<-- 2.37<--2.50<-- 2.98<--, 3.31<--, 2.65<--, 3.90<-- 1.17  Renal function worsening

## 2025-02-20 NOTE — PROGRESS NOTE ADULT - PROBLEM SELECTOR PLAN 1
- Check BG TID AC and HS while on PO diet  - Increase Lantus 9 units QHS  - Continue with Tradjenta  5 gm daily  - Would hold off starting meal coverage insulin for now and will evaluate BGs after patient starts HD. Will start low dose Admelog 1 or 2 units with meals if prandial BGs remain > 180 tomorrow after HD initiation.   - C/w low dose Admelog correctional scales TID AC and HS  Discharge Planning:   - Likely basal/bolus regimen given kidney function (doses TBD closer to d/c). Not a candidate for SGLT2 due to leg ulcers and also significantly decreased EGFR., can consider GLP1 in addition to Basal/bolus> will need close f/u with Optho due to h/o retinopathy.   Can send Rx for ozempic 0.25mg weekly x 4weeks and increase to 0.50mg, or mounjaro 2.5mg 2.5mg x4 weeks, then increase to 5.0mg weekly. (Patient denies medullary thyroid cancer, hx of pancreatitis or MEN2)  - Please make sure patient has DM management supplies (glucometer, lancets, strips, alcohol pads. pen needles)  -Patient should check BGs before meals and at bedtime.  -Contact PCP/endocrinology if BG is less than 70 X1, greater than  400 X1 or persistently greater than 200s   - Patient should check BG TID AC and HS at home. Can use CGM if pt wishes.  - Endocrine follow up: can f/u with Dr. Grace, Endocrinology.   - Needs Optho/ renal/cardiac /podiatry and vascular follow up

## 2025-02-20 NOTE — PRE PROCEDURE NOTE - PRE PROCEDURE EVALUATION
Interventional Radiology    HPI:  65 YO F with PMHx of HFrEF 30-35 and grade 2 ddfxn (last TTE on 01/16/25), DM2, and diabetic foot ulcer. Recent admission at [facility] for ADHF. Patient now represents to OSH and ultimately to [facility] as a transfer for worsening right leg pain and fluid secretion. As per documentation, patient was reported to have severe depletion of RLE blood flow beyond the popliteal vessel at knee and similar findings in the left. Patient was transferred to [facility] for CO2 angiogram with Dr. Baltazar. Patient was found to have rising creatinine, Patient now presents to IR for non tunneled HD catheter placement.       Allergies: No Known Allergies    Medications (Abx/Cardiac/Anticoagulation/Blood Products)  ampicillin/sulbactam  IVPB: 200 mL/Hr IV Intermittent (02-19 @ 22:13)  aspirin enteric coated: 81 milliGRAM(s) Oral (02-19 @ 11:29)  heparin   Injectable: 5000 Unit(s) SubCutaneous (02-19 @ 12:40)  heparin   Injectable: 5000 Unit(s) SubCutaneous (02-19 @ 22:23)  milrinone Infusion: 2.7 mL/Hr IV Continuous (02-18 @ 11:13)    Data:  T(C): 36.5  HR: 101  BP: 107/64  RR: 18  SpO2: 95%    Exam  General: No acute distress  Chest: Non labored breathing    -WBC 15.95 / HgB 8.8 / Hct 25.0 / Plt 404  -Na 138 / Cl 94 / BUN 84 / Glucose 185  -K 4.3 / CO2 23 / Cr 4.07  -ALT -- / Alk Phos -- / T.Bili --  -INR1.44    Imaging:     Plan: 64y Female presents for non tunneled HD catheter placement.   -Risks/Benefits/alternatives explained with the patient and/or healthcare proxy and witnessed informed consent obtained.

## 2025-02-20 NOTE — PROGRESS NOTE ADULT - SUBJECTIVE AND OBJECTIVE BOX
Follow-up Pulm Progress Note    No new respiratory events overnight.  Denies SOB/CP.     Medications:  MEDICATIONS  (STANDING):  ampicillin/sulbactam  IVPB 3 Gram(s) IV Intermittent every 12 hours  ampicillin/sulbactam  IVPB      aspirin enteric coated 81 milliGRAM(s) Oral daily  atorvastatin 40 milliGRAM(s) Oral at bedtime  benzocaine/menthol Lozenge 1 Lozenge Oral once  bisacodyl 5 milliGRAM(s) Oral every 12 hours  chlorhexidine 4% Liquid 1 Application(s) Topical <User Schedule>  dextrose 5%. 1000 milliLiter(s) (100 mL/Hr) IV Continuous <Continuous>  dextrose 5%. 1000 milliLiter(s) (50 mL/Hr) IV Continuous <Continuous>  dextrose 50% Injectable 25 Gram(s) IV Push once  dextrose 50% Injectable 12.5 Gram(s) IV Push once  dextrose 50% Injectable 25 Gram(s) IV Push once  glucagon  Injectable 1 milliGRAM(s) IntraMuscular once  heparin   Injectable 5000 Unit(s) SubCutaneous every 8 hours  heparin Lock (1,000 Units/mL) Injectable 1 Dose(s) Catheter once  heparin Lock (1,000 Units/mL) Injectable 1 Dose(s) Catheter once  insulin glargine Injectable (LANTUS) 9 Unit(s) SubCutaneous at bedtime  insulin lispro (ADMELOG) corrective regimen sliding scale   SubCutaneous at bedtime  insulin lispro (ADMELOG) corrective regimen sliding scale   SubCutaneous three times a day before meals  linagliptin 5 milliGRAM(s) Oral daily  milrinone Infusion 0.25 MICROgram(s)/kG/Min (5.41 mL/Hr) IV Continuous <Continuous>  mupirocin 2% Ointment 1 Application(s) Both Nostrils two times a day  pantoprazole  Injectable 40 milliGRAM(s) IV Push daily  polyethylene glycol 3350 17 Gram(s) Oral daily  senna 2 Tablet(s) Oral at bedtime  sodium chloride 0.9%. 250 milliLiter(s) (50 mL/Hr) IV Continuous <Continuous>    MEDICATIONS  (PRN):  acetaminophen     Tablet .. 650 milliGRAM(s) Oral every 6 hours PRN Mild Pain (1 - 3)  dextrose Oral Gel 15 Gram(s) Oral once PRN Blood Glucose LESS THAN 70 milliGRAM(s)/deciliter  melatonin 3 milliGRAM(s) Oral at bedtime PRN Insomnia  ondansetron Injectable 4 milliGRAM(s) IV Push every 6 hours PRN Nausea and/or Vomiting  oxyCODONE    IR 5 milliGRAM(s) Oral every 4 hours PRN Severe Pain (7 - 10)  sodium chloride 0.65% Nasal 1 Spray(s) Both Nostrils three times a day PRN Dryness  sodium chloride 0.9% lock flush 10 milliLiter(s) IV Push every 1 hour PRN Pre/post blood products, medications, blood draw, and to maintain line patency          Vital Signs Last 24 Hrs  T(C): 36.4 (20 Feb 2025 13:04), Max: 36.9 (20 Feb 2025 00:08)  T(F): 97.6 (20 Feb 2025 13:04), Max: 98.5 (20 Feb 2025 00:08)  HR: 95 (20 Feb 2025 13:04) (95 - 107)  BP: 107/68 (20 Feb 2025 13:04) (103/65 - 117/72)  BP(mean): --  RR: 18 (20 Feb 2025 13:04) (17 - 18)  SpO2: 95% (20 Feb 2025 13:04) (93% - 95%)    Parameters below as of 20 Feb 2025 13:04  Patient On (Oxygen Delivery Method): room air              02-19 @ 07:01  -  02-20 @ 07:00  --------------------------------------------------------  IN: 232.4 mL / OUT: 550 mL / NET: -317.6 mL          LABS:                        8.7    14.50 )-----------( 383      ( 20 Feb 2025 07:12 )             25.3     02-20    137  |  93[L]  |  94[H]  ----------------------------<  179[H]  4.4   |  21[L]  |  4.76[H]    Ca    9.3      20 Feb 2025 07:12  Phos  5.7     02-20  Mg     2.2     02-20    TPro  8.0  /  Alb  3.5  /  TBili  0.9  /  DBili  0.4[H]  /  AST  109[H]  /  ALT  125[H]  /  AlkPhos  173[H]  02-20          CAPILLARY BLOOD GLUCOSE      POCT Blood Glucose.: 196 mg/dL (20 Feb 2025 11:42)    PT/INR - ( 20 Feb 2025 07:12 )   PT: 15.4 sec;   INR: 1.34 ratio         PTT - ( 20 Feb 2025 07:12 )  PTT:34.6 sec  Urinalysis Basic - ( 20 Feb 2025 07:12 )    Color: x / Appearance: x / SG: x / pH: x  Gluc: 179 mg/dL / Ketone: x  / Bili: x / Urobili: x   Blood: x / Protein: x / Nitrite: x   Leuk Esterase: x / RBC: x / WBC x   Sq Epi: x / Non Sq Epi: x / Bacteria: x                Physical Examination:  PULM: coarse bilaterally   CVS: S1, S2 heard    RADIOLOGY REVIEWED  CXR: congestion   bibasilar atelectasis/effusions     CT chest:< from: CT Chest No Cont (01.25.25 @ 14:08) >  ACC: [id number] EXAM:  CT CHEST   ORDERED BY: TED CANSECO DATE:  01/25/2025        INTERPRETATION:  Clinical information: Shortness of breath.    CT scan of the chest was obtained without administration of intravenous   contrast.    Several lymph nodes are present in the mediastinum.    Heart is enlarged in size. Calcification of the coronary arteries is   noted. Small pericardial effusion is noted.    No endobronchial lesions are noted. Patchy groundglass opacities noted   within both lungs are felt to be secondary to expiratory   technique/breathing artifact. Atelectasis is noted involving portions of   both lower lobes. This is secondary to small bilateral pleural effusions.    Below the diaphragm, visualized portions of the abdomen demonstrate   patient to be status post cholecystectomy.    Degenerative changes of the spine are noted.    IMPRESSION: Small bilateral pleural effusions and small pericardial   effusion.    --- End of Report ---    < end of copied text >      TTE:  < from: TTE W or WO Ultrasound Enhancing Agent (02.10.25 @ 12:09) >    TRANSTHORACIC ECHOCARDIOGRAM REPORT  ________________________________________________________________________________                                      _______       Pt. Name:       TOMASZ ALBA Study Date:    2/10/2025  MRN:            [id number]      YOB: 1960  Accession #:    [id number]       Age:           64 years  Account#:       [id number]    Gender:        F  Heart Rate:                     Height:        63.00 in (160.02 cm)  Rhythm:                         Weight:       147.00 lb (66.68 kg)  Blood Pressure: 112/56 mmHg     BSA/BMI:       1.70 m² / 26.04 kg/m²  ________________________________________________________________________________________  Referring Physician:       [id number] Mario Lu  Interpreting Physician:    Reg Glaser MD  Primary Sonographer:       Kassie Bergeron [facility]  Fellow (Performing):       Abbe Pickard MD  Fellow (Interpreting):     Abbe Pickard MD  Fellow (2nd Interpreting): Josefina Yo MD    CPT:                ECHO TTE W CON FU LTD - .m;DEFINITY ECHO CONTRAST PER                      ML WASTED - .m;DEFINITY ECHO CONTRAST PER ML - .m  Indication(s):      Abnormal electrocardiogram ECG EKG - R94.31  Procedure:          Limited transthoracic echocardiogram.  Ordering Location:  [facility]  Admission Status:   Inpatient  Contrast Injection: Verbal consent was obtained for injection of Ultrasonic                      Enhancing Agent following a discussion of risks and                      benefits.   Endocardial visualization enhanced with 2 ml of Definity                      Ultrasound enhancing agent (Lot#:[id number] Exp.Date:08/2025                      Discarded Dose:8ml).  UEA Reaction:       Patient had no adverse reaction after injection of                    Ultrasound Enhancing Agent.  Study Information:  Image quality for this study is fair.       CONCLUSIONS:      1. Left ventricular cavity is mildly dilated. Left ventricular systolic function is severely decreased with an ejection fraction of 20 % by Winchester's method of disks. Regional wall motion abnormalities present.   2. Multiple segmental abnormalities exist. See findings.   3. Reduced right ventricular systolic function.   4. No pericardial effusion seen.   5. Compared to the transthoracic echocardiogram performed on 1/16/2025, Worsening of the left ventricular function.    ________________________________________________________________________________________  FINDINGS:     Left Ventricle:  The left ventricular cavity is mildly dilated. Left ventricular systolic function is severely decreased with a calculated ejection fraction of 20 % by the Winchester's biplane method of disks. There are regional wall motion abnormalities present.  LV Wall Scoring: The entire septum, entire apex, entire inferior wall, mid  inferolateral segment, and mid anterolateral segment are hypokinetic. All  remaining scored segments are normal.          Right Ventricle:  The right ventricular cavity is normal in size and right ventricular systolic function is reduced. Tricuspid annular plane systolic excursion (TAPSE) is 1.2 cm (normal >=1.7 cm).     Pericardium:  No pericardial effusion seen.  ____________________________________________________________________  QUANTITATIVE DATA:  Left Ventricle Measurements: (Indexed to BSA)     BiPlane LV EF%: 20 %             Right Ventricle Measurements:     TAPSE: 1.2 cm       LVOT / RVOT/ Qp/Qs Data: (Indexed to BSA)  LVOT Vmax:      0.70 m/s  LVOT Vmn:       0.455 m/s  LVOT VTI:       11.00 cm  LVOT peak grad: 2 mmHg  LVOT mean grad: 1.0 mmHg    < end of copied text >

## 2025-02-20 NOTE — PROGRESS NOTE ADULT - PROBLEM SELECTOR PLAN 1
-elevated LFTs and rising creatinine concerning for low flow state  -increase milrinone to 0.25; may be inotrope dependent  - continue to hold diuretics today, give 250cc IVF (IVC 1.7cm)  -check perfusion markers (LFTs, lactate) daily  - continue to hold spironolactone

## 2025-02-20 NOTE — PROGRESS NOTE ADULT - SUBJECTIVE AND OBJECTIVE BOX
MR#[id number]  PATIENT NAME:COLE ALBA    DATE OF SERVICE: 25 @ 07:09  Patient was seen and examined by Yordy Gilmore MD on    25 @ 07:09 .  Interim events noted.Consultant notes ,Labs,Telemetry reviewed by me       HOSPITAL COURSE: HPI:  64F, hx of CHF (last TTE on 25 EF 30-35%, G2DD), DM, diabetic foot ulcer with a recent admission at [facility] for CHF exacerbation presented as a transfer for worsening R. leg pain and fluid secretion, On non-invasive imaging demonstrating severe depletion of RLE blood flow beyond the popliteal vessel at knee and similar findings in L. Was transferred to [facility] for CO2 angiogram with Dr. Baltazar. (2025 20:05)    Transferred from [facility] on previous admission: LVSF moderately decreased w/EF 30-35%. Moderate G2DD. Mild MR. Ischemic evaluation was recommended for which patient undergone stress test which revealed a small-sized moderate defect in the apical wall that is predominantly fixed suggestive of an infarction with minimal marcus-infarct ischemia. Patient was continued on their home Metoprolol Succ 25 ER and restarted Entresto  [facility] consulted for continued leg pain and recommended imaging: Right Leg Arterial Duplex: Popliteal artery is occluded with negligible flow of right trifurcation arteries. Left leg Arterial Duplex: Slightly tardus parvus waveform of the left popliteal artery is noted, for which underlying disease cannot be excluded. Posterior tibial artery waveform is nonpulsatile. Anterior tibial artery tardus parvus flow is noted. Patient started on Heparin and Dobutamine drip with transfer to [facility] for further management.        INTERIM EVENTS:Patient seen at bedside ,interim events noted.  -on  5mcg and Bumex gtt   02/10-OOB still orthopneac [id number] LE weakness and swelling  -c/o LE pain not dyspneac  held on Bumex 3 mg/Hr  -Awake seen by HF   -Remains on Bumex 3mg/Hr for possible LHC/RHC  -Had cath Multivessel CAD started on Milrinone for CTS evaluation albeit targets not optimal  02/15-Awake on Milrinone and Bumex gtt-seen by CTS not a surgical candidate-needs Vascular work-up for LE PAD-scheduled for Angiogram on Wednesday    Weight 170Kg---> 164 Kg--->161.1 Kg  -Awake is able to lie flat c/o RLE pain Sinus Rhythm UOP-2750mL,on Milrinone 0.25mcg Bumex 3mg/Hr,Metolazone 10mg  -Awake is able to lie flat remains on Milrinone and Bumex 3mg/Hr UOP-1500mL-  -Awake no chest pain or dyspnea at rest Hypertonic saline stopped Na 150,Bumex held remains on Milrinone gtt Sinus rhythm  UOP-1425mL  -Awake is off diuretics for cMR and CO2 angiogram  -Had cMD Plan for HD catheter placement to initiate Dialysis      MEDICATIONS  (STANDING):  ampicillin/sulbactam  IVPB 3 Gram(s) IV Intermittent every 12 hours  ampicillin/sulbactam  IVPB      aspirin enteric coated 81 milliGRAM(s) Oral daily  atorvastatin 40 milliGRAM(s) Oral at bedtime  benzocaine/menthol Lozenge 1 Lozenge Oral once  bisacodyl 5 milliGRAM(s) Oral every 12 hours  glucagon  Injectable 1 milliGRAM(s) IntraMuscular once  heparin   Injectable 5000 Unit(s) SubCutaneous every 8 hours  heparin Lock (1,000 Units/mL) Injectable 1 Dose(s) Catheter once  heparin Lock (1,000 Units/mL) Injectable 1 Dose(s) Catheter once  insulin glargine Injectable (LANTUS) 9 Unit(s) SubCutaneous at bedtime  insulin lispro (ADMELOG) corrective regimen sliding scale   SubCutaneous three times a day before meals  insulin lispro (ADMELOG) corrective regimen sliding scale   SubCutaneous at bedtime  linagliptin 5 milliGRAM(s) Oral daily  milrinone Infusion 0.125 MICROgram(s)/kG/Min (2.7 mL/Hr) IV Continuous <Continuous>  mupirocin 2% Ointment 1 Application(s) Both Nostrils two times a day  pantoprazole  Injectable 40 milliGRAM(s) IV Push daily  polyethylene glycol 3350 17 Gram(s) Oral daily  senna 2 Tablet(s) Oral at bedtime    MEDICATIONS  (PRN):  acetaminophen     Tablet .. 650 milliGRAM(s) Oral every 6 hours PRN Mild Pain (1 - 3)  dextrose Oral Gel 15 Gram(s) Oral once PRN Blood Glucose LESS THAN 70 milliGRAM(s)/deciliter  melatonin 3 milliGRAM(s) Oral at bedtime PRN Insomnia  ondansetron Injectable 4 milliGRAM(s) IV Push every 6 hours PRN Nausea and/or Vomiting  oxyCODONE    IR 5 milliGRAM(s) Oral every 4 hours PRN Severe Pain (7 - 10)  sodium chloride 0.65% Nasal 1 Spray(s) Both Nostrils three times a day PRN Dryness            REVIEW OF SYSTEMS:  Constitutional: [ ] fever, [ ]weight loss,  [x ]fatigue [ ]weight gain  Eyes: [ ] visual changes  Respiratory: [ ]shortness of breath;  [ ] cough, [ ]wheezing, [ ]chills, [ ]hemoptysis  Cardiovascular: [ ] chest pain, [ ]palpitations, [ ]dizziness,  [ ]leg swelling[ ]orthopnea[ ]PND  Gastrointestinal: [ ] abdominal pain, [ ]nausea, [ ]vomiting,  [ ]diarrhea [ ]Constipation [ ]Melena  Genitourinary: [ ] dysuria, [ ] hematuria [ ][place]  Neurologic: [ ] headaches [ ] tremors[ ]weakness [ ]Paralysis Right[ ] Left[ ]  Skin: [ ] itching, [ ]burning, [ ] rashes  Endocrine: [ ] heat or cold intolerance  Musculoskeletal: [ ] joint pain or swelling; [ ] muscle, back, or extremity pain  Psychiatric: [ ] depression, [ ]anxiety, [ ]mood swings, or [ ]difficulty sleeping  Hematologic: [ ] easy bruising, [ ] bleeding gums    [ ] All remaining systems negative except as per above.   [ ]Unable to obtain.  [x] No change in ROS since admission      Vital Signs Last 24 Hrs  T(C): 36.5 (2025 05:32), Max: 36.9 (2025 00:08)  T(F): 97.7 (2025 05:32), Max: 98.5 (2025 00:08)  HR: 101 (2025 05:32) (83 - 107)  BP: 107/64 (2025 05:32) (98/63 - 112/71)  RR: 18 (2025 05:32) (18 - 18)  SpO2: 95% (2025 05:32) (94% - 96%)    Parameters below as of 2025 05:32  Patient On (Oxygen Delivery Method): room air      I&O's Summary    2025 07:01  -  2025 07:00  --------------------------------------------------------  IN: 232.4 mL / OUT: 550 mL / NET: -317.6 mL        PHYSICAL EXAM:  General: No acute distress BMI-28  HEENT: EOMI, PERRL  Neck: Supple, [ ] JVD  Lungs: Equal air entry bilaterally; [ ] rales [ ] wheezing [ ] rhonchi  Heart: Regular rate and rhythm; [x ] murmur   2/6 [ x] systolic [ ] diastolic [ ] radiation[ ] rubs [ ]  gallops  Abdomen: Nontender, bowel sounds present  Extremities: No clubbing, cyanosis, [xx ] edema [ x]Bilateral lower extremity venous stasis wounds to dermis, mild serous drainage, no acute signs of infection. Left foot hallux distal tuft well adhered eschar, no acute signs of infection.   Nervous system:  Alert & Oriented X3, no focal deficits  Psychiatric: Normal affect  Skin: No rashes or lesions    LABS:      138  |  94[L]  |  84[H]  ----------------------------<  185[H]  4.3   |  23  |  4.07[H]    Ca    9.2      2025 05:03  Phos  4.6       Mg     2.1           Creatinine Trend: 4.07<--, 3.49<--, 2.93<--, 2.95<--, 2.81<--, 2.80<--                        8.8    15.95 )-----------( 404      ( 2025 05:03 )             25.0     PT/INR - ( 2025 05:04 )   PT: 16.4 sec;   INR: 1.44 ratio         PTT - ( 2025 05:04 )  PTT:43.7 sec      TTE Limited W or WO Ultrasound Enhancing Agent (25 @ 12:39) >  CONCLUSIONS:      1. Limited TTE to assess for MR, IVC, LVOT, TR.   2. Left ventricular systolic function is severely decreased.   3. Reduced right ventricular systolic function.   4. LVOT VTI 12.0cm, Stroke volume 35cc. LVOT diameter 1.9cm.   5. Mild to moderate tricuspid regurgitation.   6. Estimated pulmonary artery systolic pressure is 32 mmHg, consistent with normal pulmonary artery pressure.   7. The inferior vena cava is normal in size measuring 1.70 cm in diameter, (normal <2.1cm) with abnormal inspiratory collapse (abnormal <50%) consistent with mildly elevated right atrial pressure (~8, range 5-10mmHg).   8. Compared to the transthoracic echocardiogram performed on 2/10/2025, RV and LV function still .             TTE W or WO Ultrasound Enhancing Agent (02.10.25 @ 12:09) >  CONCLUSIONS:      1. Left ventricular cavity is mildly dilated. Left ventricular systolic function is severely decreased with an ejection fraction of 20 % by Winchester's method of disks. Regional wall motion abnormalities present.   2. Multiple segmental abnormalities exist. See findings.   The entire septum, entire apex, entire inferior wall, mid inferolateral segment, and mid anterolateral segment are hypokinetic. All remaining scored segments are normal.     3. Reduced right ventricular systolic function.   4. No pericardial effusion seen.   5. Compared to the transthoracic echocardiogram performed on 2025, Worsening of the left ventricular function.        VA Duplex Lower Ext Vein Scan, Bilat (02.10.25 @ 17:42) >  IMPRESSION: No evidence of bilateral DVT within the imaged veins.        Nuclear Stress Test-Pharmacologic.. (25 @ 10:31) >  Conclusions:   1. Myocardial Perfusion: Abnormal.   2. Qualitative Perfusion:      - small-sized, moderate defect(s) in the apical wall that is predominantly fixed suggestive of an infarction with minimal marcus-infarct ischemia.   3. The post stress left ventricular EF is 25 %. The stress end diastolic volume is 118 ml.   4. Results D/Wcardiologist Dr. Gilmore at 18:31        RHC: RA 20, PA 49/29 (40), PCWP 35, mVO2 51.1, CO/CI 3.8/2.2, SVR 1095  LHC: RCA , severe ostial LM disease, diffuse disease in LAD and LCx, some L to R collaterals        MR Cardiac w/wo IV Cont (25 @ 09:44) >  IMPRESSION:.    The left ventricle demonstrates severe wall motion abnormalities and  function is depressed (calculated LVEF is 25%).    There is greater than 75% subendocardial scarring involving the basal to  mid inferior wall of the left ventricle.    There is left ventricular transmural scarring involving the apical septal  wall and likely near transmural scarring of the apical lateral wall.    There is about 50% scarring of the left ventricular basal inferoseptal  wall.

## 2025-02-20 NOTE — PROGRESS NOTE ADULT - ASSESSMENT
64y Female with history of CHF presents as a transfer for LE CO2 angiogram. Nephrology consulted for elevated Scr.    1) JAMEL: likely due to ATN in addition to CRS. Scr continuing to increase, but unclear if this is due to an JAMEL or due to a decrease in volume overload. Monitor CMP daily. UA relatively bland. FeUrea low consistent with low flow state. Renal US unremarkable. Bladder scan on 2/3 negative. Avoid nephrotoxins. Need accurate measurements of intake and urinary output. s/p Cardiac MRI on 2/19. Per discussion with Internal Medicine and Cardiology, patient will benefit from improved kidney function before LLE angiogram can be performed. Obtained consent for HD from patient and her brothers. Plan for a dialysis session on Thursday 2/20 after dialysis catheter is placed.    2) HTN: BP low normal. Monitor off anti-hypertensive medications.    3) LE edema: Not secondary to nephrotic syndrome given subnephrotic range proteinuria. Continue milrinone gtt as per Cardiology and hold diuretics as per Cardiology. TTE with severely decreased LVSF. Monitor UO. Recommend checking CMP twice daily while on high doses of Bumex.    4) Hyperphosphatemia: Resolved. Continue with low phosphorus diet.     5. Hypernatremia: Due to receiving 3% hypertonic saline, has since resolved after holding on 2/17. Cardiology is providing this to augment urinary output, but I would deferring on this and using multiple diuretics at different areas of the [facility] (after Cardiac MRI occurs, resume diuretic therapy).    Plan for hemodialysis on 2/20 after HD catheter is placed.    [facility] NEPHROLOGY  Raji Waterman M.D.  Mars Feliz D.O.  Kelley Parks M.D.  MD Nancy Kulkarni, MSN, ANP-C    Telephone: [phone]  Facsimile: [phone], [address]  [place] [address]

## 2025-02-20 NOTE — PROGRESS NOTE ADULT - SUBJECTIVE AND OBJECTIVE BOX
Chief Complaint: Diabetes Mellitus follow up    INTERVAL HX:  Patient seen at bedside with RN and family present. Had HD catheter placed, will start on HD.   Reports eating well, finishes most food on each tray.   BG over the last 24 hrs have been within goal range 100-180mg/dl. No hypoglycemia.     Review of Systems:  General: As above  GI: No nausea, vomiting  Endocrine: no  S&Sx of hypoglycemia    Allergies    No Known Allergies    Intolerances    Augmentin (Stomach Upset; Vomiting; Nausea)    MEDICATIONS  (STANDING):  ampicillin/sulbactam  IVPB 3 Gram(s) IV Intermittent every 12 hours  ampicillin/sulbactam  IVPB      aspirin enteric coated 81 milliGRAM(s) Oral daily  atorvastatin 40 milliGRAM(s) Oral at bedtime  benzocaine/menthol Lozenge 1 Lozenge Oral once  bisacodyl 5 milliGRAM(s) Oral every 12 hours  chlorhexidine 4% Liquid 1 Application(s) Topical <User Schedule>  dextrose 5%. 1000 milliLiter(s) (100 mL/Hr) IV Continuous <Continuous>  dextrose 5%. 1000 milliLiter(s) (50 mL/Hr) IV Continuous <Continuous>  dextrose 50% Injectable 25 Gram(s) IV Push once  dextrose 50% Injectable 12.5 Gram(s) IV Push once  dextrose 50% Injectable 25 Gram(s) IV Push once  glucagon  Injectable 1 milliGRAM(s) IntraMuscular once  heparin   Injectable 5000 Unit(s) SubCutaneous every 8 hours  heparin Lock (1,000 Units/mL) Injectable 1 Dose(s) Catheter once  heparin Lock (1,000 Units/mL) Injectable 1 Dose(s) Catheter once  insulin glargine Injectable (LANTUS) 9 Unit(s) SubCutaneous at bedtime  insulin lispro (ADMELOG) corrective regimen sliding scale   SubCutaneous three times a day before meals  insulin lispro (ADMELOG) corrective regimen sliding scale   SubCutaneous at bedtime  linagliptin 5 milliGRAM(s) Oral daily  milrinone Infusion 0.25 MICROgram(s)/kG/Min (5.41 mL/Hr) IV Continuous <Continuous>  mupirocin 2% Ointment 1 Application(s) Both Nostrils two times a day  pantoprazole  Injectable 40 milliGRAM(s) IV Push daily  polyethylene glycol 3350 17 Gram(s) Oral daily  senna 2 Tablet(s) Oral at bedtime  sodium chloride 0.9%. 250 milliLiter(s) (50 mL/Hr) IV Continuous <Continuous>      atorvastatin   40 milliGRAM(s) Oral (02-19-25 @ 22:13)    insulin glargine Injectable (LANTUS)   9 Unit(s) SubCutaneous (02-19-25 @ 22:23)    insulin lispro (ADMELOG) corrective regimen sliding scale   1 Unit(s) SubCutaneous (02-20-25 @ 11:58)   1 Unit(s) SubCutaneous (02-20-25 @ 08:01)   1 Unit(s) SubCutaneous (02-19-25 @ 17:43)    linagliptin   5 milliGRAM(s) Oral (02-20-25 @ 11:45)        PHYSICAL EXAM:  VITALS: T(C): 36.4 (02-20-25 @ 14:00)  T(F): 97.5 (02-20-25 @ 14:00), Max: 98.5 (02-20-25 @ 00:08)  HR: 102 (02-20-25 @ 14:00) (95 - 107)  BP: 103/62 (02-20-25 @ 14:00) (103/62 - 117/72)  RR:  (17 - 22)  SpO2:  (92% - 95%)  Wt(kg): --  GENERAL: NAD  Respiratory: Respirations unlabored   Extremities: Warm, no edema  NEURO: Alert , appropriate     LABS:  POCT Blood Glucose.: 196 mg/dL (02-20-25 @ 11:42)  POCT Blood Glucose.: 190 mg/dL (02-20-25 @ 08:00)  POCT Blood Glucose.: 192 mg/dL (02-19-25 @ 21:31)  POCT Blood Glucose.: 192 mg/dL (02-19-25 @ 17:27)  POCT Blood Glucose.: 231 mg/dL (02-19-25 @ 12:02)  POCT Blood Glucose.: 214 mg/dL (02-19-25 @ 06:08)  POCT Blood Glucose.: 203 mg/dL (02-18-25 @ 23:54)  POCT Blood Glucose.: 195 mg/dL (02-18-25 @ 21:46)  POCT Blood Glucose.: 200 mg/dL (02-18-25 @ 18:09)  POCT Blood Glucose.: 241 mg/dL (02-18-25 @ 12:06)  POCT Blood Glucose.: 210 mg/dL (02-18-25 @ 08:07)  POCT Blood Glucose.: 227 mg/dL (02-17-25 @ 21:21)  POCT Blood Glucose.: 206 mg/dL (02-17-25 @ 17:02)                          8.7    14.50 )-----------( 383      ( 20 Feb 2025 07:12 )             25.3     02-20    137  |  93[L]  |  94[H]  ----------------------------<  179[H]  4.4   |  21[L]  |  4.76[H]    Ca    9.3      20 Feb 2025 07:12  Phos  5.7     02-20  Mg     2.2     02-20    TPro  8.0  /  Alb  3.5  /  TBili  0.9  /  DBili  0.4[H]  /  AST  109[H]  /  ALT  125[H]  /  AlkPhos  173[H]  02-20    eGFR: 10 mL/min/1.73m2 (20 Feb 2025 07:12)    01-24 Chol 122 Direct LDL -- LDL calculated 66 HDL 39[L] Trig 89  Thyroid Function Tests:  01-24 @ 05:40 TSH 5.34 FreeT4 -- T3 -- Anti TPO -- Anti Thyroglobulin Ab -- TSI --      A1C with Estimated Average Glucose Result: 11.6 % (01-24-25 @ 05:40)  A1C with Estimated Average Glucose Result: >15.5 % (12-13-24 @ 06:49)    Estimated Average Glucose: 286 mg/dL (01-24-25 @ 05:40)  Estimated Average Glucose: >398 mg/dL (12-13-24 @ 06:49)        Diet, Regular:   Consistent Carbohydrate No Snacks (CSTCHO)  Low Sodium  No Concentrated Phosphorus  Lacto Veg (Accepts Milk & Milk Products) (02-14-25 @ 13:42) [Active]

## 2025-02-20 NOTE — PROGRESS NOTE ADULT - PROBLEM SELECTOR PLAN 1
-Primarily with exertion & when laying flat. Pt denies SOB at rest.  -Suspect 2nd to fluid overload, underlying CHF  -Keep O>I as tolerated  -VBG 2/14 acceptable  -ABX per primary team for LE wounds  -Keep sats >90% with O2 PRN (currently RA).  -Pt states SOB improving at this time, able to lay flat with no c/o SOB at this time

## 2025-02-21 DIAGNOSIS — Z71.89 OTHER SPECIFIED COUNSELING: ICD-10-CM

## 2025-02-21 DIAGNOSIS — Z51.5 ENCOUNTER FOR PALLIATIVE CARE: ICD-10-CM

## 2025-02-21 LAB
ALBUMIN SERPL ELPH-MCNC: 3.4 G/DL — SIGNIFICANT CHANGE UP (ref 3.3–5)
ALP SERPL-CCNC: 171 U/L — HIGH (ref 40–120)
ALT FLD-CCNC: 103 U/L — HIGH (ref 10–45)
ANION GAP SERPL CALC-SCNC: 19 MMOL/L — HIGH (ref 5–17)
AST SERPL-CCNC: 72 U/L — HIGH (ref 10–40)
BILIRUB SERPL-MCNC: 1.2 MG/DL — SIGNIFICANT CHANGE UP (ref 0.2–1.2)
BUN SERPL-MCNC: 66 MG/DL — HIGH (ref 7–23)
CALCIUM SERPL-MCNC: 9.4 MG/DL — SIGNIFICANT CHANGE UP (ref 8.4–10.5)
CHLORIDE SERPL-SCNC: 95 MMOL/L — LOW (ref 96–108)
CO2 SERPL-SCNC: 23 MMOL/L — SIGNIFICANT CHANGE UP (ref 22–31)
CREAT SERPL-MCNC: 3.38 MG/DL — HIGH (ref 0.5–1.3)
CULTURE RESULTS: SIGNIFICANT CHANGE UP
EGFR: 15 ML/MIN/1.73M2 — LOW
EGFR: 15 ML/MIN/1.73M2 — LOW
GLUCOSE BLDC GLUCOMTR-MCNC: 105 MG/DL — HIGH (ref 70–99)
GLUCOSE BLDC GLUCOMTR-MCNC: 126 MG/DL — HIGH (ref 70–99)
GLUCOSE BLDC GLUCOMTR-MCNC: 146 MG/DL — HIGH (ref 70–99)
GLUCOSE BLDC GLUCOMTR-MCNC: 152 MG/DL — HIGH (ref 70–99)
GLUCOSE SERPL-MCNC: 96 MG/DL — SIGNIFICANT CHANGE UP (ref 70–99)
HAV IGM SER-ACNC: SIGNIFICANT CHANGE UP
HBV CORE AB SER-ACNC: SIGNIFICANT CHANGE UP
HBV CORE IGM SER-ACNC: SIGNIFICANT CHANGE UP
HBV SURFACE AB SER-ACNC: 70.7 MIU/ML — SIGNIFICANT CHANGE UP
HCT VFR BLD CALC: 27.7 % — LOW (ref 34.5–45)
HGB BLD-MCNC: 9.5 G/DL — LOW (ref 11.5–15.5)
LACTATE SERPL-SCNC: 1.3 MMOL/L — SIGNIFICANT CHANGE UP (ref 0.5–2)
MCHC RBC-ENTMCNC: 24.7 PG — LOW (ref 27–34)
MCHC RBC-ENTMCNC: 34.3 G/DL — SIGNIFICANT CHANGE UP (ref 32–36)
MCV RBC AUTO: 72.1 FL — LOW (ref 80–100)
NRBC BLD AUTO-RTO: 0 /100 WBCS — SIGNIFICANT CHANGE UP (ref 0–0)
PLATELET # BLD AUTO: 329 K/UL — SIGNIFICANT CHANGE UP (ref 150–400)
POTASSIUM SERPL-MCNC: 3.6 MMOL/L — SIGNIFICANT CHANGE UP (ref 3.5–5.3)
POTASSIUM SERPL-SCNC: 3.6 MMOL/L — SIGNIFICANT CHANGE UP (ref 3.5–5.3)
PROT SERPL-MCNC: 8.5 G/DL — HIGH (ref 6–8.3)
RBC # BLD: 3.84 M/UL — SIGNIFICANT CHANGE UP (ref 3.8–5.2)
RBC # FLD: 20.6 % — HIGH (ref 10.3–14.5)
SODIUM SERPL-SCNC: 137 MMOL/L — SIGNIFICANT CHANGE UP (ref 135–145)
SPECIMEN SOURCE: SIGNIFICANT CHANGE UP
WBC # BLD: 13.29 K/UL — HIGH (ref 3.8–10.5)
WBC # FLD AUTO: 13.29 K/UL — HIGH (ref 3.8–10.5)

## 2025-02-21 PROCEDURE — 99232 SBSQ HOSP IP/OBS MODERATE 35: CPT

## 2025-02-21 PROCEDURE — 99223 1ST HOSP IP/OBS HIGH 75: CPT

## 2025-02-21 PROCEDURE — 99497 ADVNCD CARE PLAN 30 MIN: CPT | Mod: 25

## 2025-02-21 RX ORDER — HYDROMORPHONE/SOD CHLOR,ISO/PF 2 MG/10 ML
1 SYRINGE (ML) INJECTION EVERY 6 HOURS
Refills: 0 | Status: DISCONTINUED | OUTPATIENT
Start: 2025-02-21 | End: 2025-02-23

## 2025-02-21 RX ADMIN — Medication 1 MILLIGRAM(S): at 21:20

## 2025-02-21 RX ADMIN — HEPARIN SODIUM 5000 UNIT(S): 1000 INJECTION INTRAVENOUS; SUBCUTANEOUS at 18:39

## 2025-02-21 RX ADMIN — MILRINONE LACTATE 5.41 MICROGRAM(S)/KG/MIN: 1 INJECTION, SOLUTION INTRAVENOUS at 04:59

## 2025-02-21 RX ADMIN — Medication 650 MILLIGRAM(S): at 04:56

## 2025-02-21 RX ADMIN — Medication 40 MILLIGRAM(S): at 11:56

## 2025-02-21 RX ADMIN — Medication 3 MILLIGRAM(S): at 21:02

## 2025-02-21 RX ADMIN — Medication 650 MILLIGRAM(S): at 05:50

## 2025-02-21 RX ADMIN — HEPARIN SODIUM 5000 UNIT(S): 1000 INJECTION INTRAVENOUS; SUBCUTANEOUS at 04:58

## 2025-02-21 RX ADMIN — Medication 1 APPLICATION(S): at 10:10

## 2025-02-21 RX ADMIN — OXYCODONE HYDROCHLORIDE 5 MILLIGRAM(S): 30 TABLET ORAL at 05:50

## 2025-02-21 RX ADMIN — MUPIROCIN CALCIUM 1 APPLICATION(S): 20 CREAM TOPICAL at 04:58

## 2025-02-21 RX ADMIN — INSULIN GLARGINE-YFGN 9 UNIT(S): 100 INJECTION, SOLUTION SUBCUTANEOUS at 22:30

## 2025-02-21 RX ADMIN — AMPICILLIN SODIUM AND SULBACTAM SODIUM 200 GRAM(S): 1; .5 INJECTION, POWDER, FOR SOLUTION INTRAMUSCULAR; INTRAVENOUS at 11:53

## 2025-02-21 RX ADMIN — AMPICILLIN SODIUM AND SULBACTAM SODIUM 200 GRAM(S): 1; .5 INJECTION, POWDER, FOR SOLUTION INTRAMUSCULAR; INTRAVENOUS at 21:21

## 2025-02-21 RX ADMIN — Medication 81 MILLIGRAM(S): at 11:55

## 2025-02-21 RX ADMIN — LINAGLIPTIN 5 MILLIGRAM(S): 5 TABLET, FILM COATED ORAL at 18:40

## 2025-02-21 RX ADMIN — OXYCODONE HYDROCHLORIDE 5 MILLIGRAM(S): 30 TABLET ORAL at 04:55

## 2025-02-21 RX ADMIN — ATORVASTATIN CALCIUM 40 MILLIGRAM(S): 80 TABLET, FILM COATED ORAL at 21:02

## 2025-02-21 RX ADMIN — Medication 5 MILLIGRAM(S): at 05:00

## 2025-02-21 NOTE — PROGRESS NOTE ADULT - ASSESSMENT
65 YO F with PMHx of HFrEF 30-35 and grade 2 ddfxn (last TTE on 01/16/25), DM2, and diabetic foot ulcer. Recent admission at [facility] for ADHF. Patient now represents to OSH and ultimately to [facility] as a transfer for worsening right leg pain and fluid secretion. As per documentation, patient was reported to have severe depletion of RLE blood flow beyond the popliteal vessel at knee and similar findings in the left. Patient was transferred to [facility] for CO2 angiogram with Dr. Baltazar. Of note, patient also with reported visual disturbance for which patient was seen and evaluated by opthalmology. Internal Medicine has been consulted on Ms. Kirby's care for medical management.     # ADHF/ BiV HFrEF 20   - Recent admission at [facility] for ADHF and on dobutamine outpatient  - TTE 1/16 with EF 30-35 with moderately reduced LVSF and grade 2 ddfxn, normal RVSF , trace TR/ NC, and trace pericardial effusion  - NST abnormal with small-sized, moderate defect in apical wall that is predominantly fixed suggestive of an infarction with minimal marcus-infarct ischemia. Post stress LVEF 25.  - CT Chest with small BL pleural effusions and small pericardial effusion.  - RPT TTE with EF 20, severely decreased LV, decreased RVSF with TAPSE 1.2, and regional wall motion abnormalities present with entire septum, entire apex, entire inferior wall, mid inferolateral segment, and mid anterolateral segment are hypokinetic.   - RHC with RA 20, PA 49/29 (40), PCWP 35, mVO2 51.1, CO/CI 3.8/2.2, SVR 1095 w/ Multivessel CAD   - s/p zaroxyln 10 QD  - Continue on milrinone 0.125mcg GTT (decreased 2/18) with aldactone for now  - CRE and sodium rising and bumex GTT stopped 2/18 and HTS stopped 2/16   - RPT limited TTE w/ LVSF  severely decreased, Reduced RVSF, LVOT VTI 12.0cm, Mild to mod TR, mildly elevated right atrial pressure  - HF following and adjusting medications   - Case discussed with EP and needs to be on GDMT for 3 months before AICD placement and should be stabilized off of inotropic support.   - Monitor I and O, diuresis per Cardio/ Renal    - GDMT as per Cardio  - Serial CXR   - Monitor volume status   - Check daily weights    - Monitor on telemetry; Monitor electrolytes   - Cardio, HF, Renal, and EP evals appreciated; F/u recs     # CAD with TVD   - [facility] with RCA , severe ostial LM disease, diffuse disease in LAD and LCx, some L to R collaterals (Multivessel CAD)  - Case discussed with CTSx and NOT a candidate for CABG due to comorbidities  - Cardiac MRI done F/u Cardio   - AS and Statin   - Cardiology following     # BL LE Edema likely in setting of ADHF  - Diuresis as per Cardio, HF and Renal   - BL LE elevation and compression  - LE Duplex neg   - Monitor for now  - Podiatry and Vascular evals appreciated; F/u recs    # Pericardial effusion likely in setting ADHF  - TTE with trace pericardial effusion   - CT Chest with small pericardial effusion   - Denies chest pain, palpitations, chest tightness or discomfort, shortness of breath or dyspnea   - Repeat TTE in 1 month for further evaluation   - Diuresis per cardio/ renal.  - Monitor on telemetry  - Cardio following    # Pleural effusion likely in setting ADHF  - CT Chest with small BL pleural effusions  - On RA with no respiratory complaints at present  - Monitor O2 saturation  - Supplement to maintain > 90%   - Diuresis per cardio/ renal.     # JAMEL with Hyponatremia and Metabolic Acidosis   - Baseline CRE 0.7-1.2 and increased to ~4  - As per OSH documentation, JAMEL was thought to be second to Unasyn/ Bumex / Entresto use and Hypotension   - US RENAL with no hydronephrosis  - CRE improved slightly with diuresis/ inotropic support and likely cardiorenal induced with low flow state in ADHF, however now rising again and concern for milrinone vs overdiuresis (prerenal) vs cardiorenal.   - Milrinone adjusted as above and diuresis remains off per HF .   - Remains nonoliguric, S/P Shiley 2/20 w/ IR and initiation of HD .  - HF following and adjusting medications.    - Continue with HF support as above  - Avoid nephrotoxic agents  - Monitor Cr and daily BMP   - Renal eval appreciated    # Transaminitis  - Likely 2/2 hepatic congestion   - Volume removal with HD as tolerated  - Trend LFTs, if uptrend post-HD initiation, check ABD US  - Serial ABD Examinations    # Retention   - Pt with + bladder scan   - Check additional scan  - Place stiles as per protocol if +     # Hypernatremia   - Hypernatremia likely from HTS vs over diuresis/ intravascular dehydraiton   - Hold further HTS   - Sodium improved   - Monitor sodium     # Leukocytosis likely in setting of BL LE ulcers with pop occlusion   - BCx negative   - UCx with probable contamination   - On unasyn for ABX. Frequency increased to Q12 as per Renal   - Leukocytosis fluctuating, however appears nontoxic with no fever spikes and monitoring closely on below unasyn.   - If febrile check pan cultures   - Trend CBC, temp curve, VS and adjust as tolerated    # Foot ulcers with worsening RLE pain second to pop artery occlusion +/- diabetic ulcers   - RLE Arterial Duplex with popliteal artery occlusion   - LLE Arterial Duplex with underlying disease cannot be excluded   - Patient transferred to [facility] for CO2 angiogram, however given ADHF currently not medically optimized and high risk. Plan to continue on diuresis and inotropic support as above   - Continue on Lipitor  - Was on Heparin GTT for now and now on HSQ  - Continue pain control with oxy  - Wound care called for dressing recs   - Vascular following , plan for angio week monday     # Tachycardia likely pain related   - On Toprol and improved  - Continue to monitor on tele   - Cardio eval appreciated    # Visual Disturbance  - CT Head w/ old infarcts  - On ASA and Statin   - Opthalmology eval appreciated    # Indigestion and Constipation   - PPI, miralax and senna continued  - Monitor BMs    # Anemia likely mixed AOCD vs iron deficiency   - HH stable in 9s on HSQ  - Consider iron tabs when optimized   - Monitor HH   - Transfuse for Hgb < 8  - Maintain active T/S    # Diabetes Mellitus A1C 11.6   - Continue on lantus 10 with tradjecta 5 and ISS   - Diabetic DASH diet   - Monitor and adjust glucose levels PRN   - Endocrine following; F/u recs     # HLD and HLD  - Continue on lipitor and toprol  - Monitor BP    # DISPO TBD            discussed in detail with patient brother over th ephone. pain meds adjusted   all questions answered

## 2025-02-21 NOTE — PROGRESS NOTE ADULT - ASSESSMENT
64y Female with history of CHF presents as a transfer for LE CO2 angiogram. Nephrology consulted for elevated Scr.    1) JAMEL: likely due to ATN in addition to CRS. Scr continuing to increase, but unclear if this is due to an JAMEL or due to a decrease in volume overload. Monitor CMP daily. UA relatively bland. FeUrea low consistent with low flow state. Renal US unremarkable. Bladder scan on 2/3 negative. Avoid nephrotoxins. Need accurate measurements of intake and urinary output. s/p Cardiac MRI on 2/19. Per discussion with Internal Medicine and Cardiology, patient will benefit from improved kidney function before LLE angiogram can be performed. Obtained consent for HD from patient and her brothers. s/p HD on 2/20. HD was performed to allow adequate kidney function to perform angiogram. There were no indicators that the patient would need to be dialysis dependent; she has severe kidney dysfunction, but she is not anuric, severely. hyperkalemic, uremic or severely acidotic. Will check labs and volume status on a daily basis to determine if/when further HD is needed.    2) HTN: BP low normal. Monitor off anti-hypertensive medications.    3) LE edema: Not secondary to nephrotic syndrome given subnephrotic range proteinuria. Continue milrinone gtt as per Cardiology and hold diuretics as per Cardiology. TTE with severely decreased LVSF. Monitor UO. Recommend checking CMP twice daily while on high doses of Bumex.    4) Hyperphosphatemia: Resolved. Continue with low phosphorus diet.     5. Hypernatremia: Due to receiving 3% hypertonic saline, has since resolved after holding on 2/17. Cardiology is providing this to augment urinary output, but I would deferring on this and using multiple diuretics at different areas of the [facility] (after Cardiac MRI occurs, resume diuretic therapy).    [facility] NEPHROLOGY  Raji Waterman M.D.  Mars Feliz D.O.  Kelley Parks M.D.  MD Nancy Kulkarni, MSN, ANP-C    Telephone: [phone]  Facsimile: [phone]    [address] [place] [address], [address]  [place] [address]

## 2025-02-21 NOTE — PROGRESS NOTE ADULT - ASSESSMENT
64y.o. Female with PAD, CLTI affecting b/l LE, CHF, chronic b/l LE wound R worsen then the L, with R foot blistering and ulceration was transfer to [facility] for CO2 angiogram. Currently patient Cr is elevated, not medically optimized for the procedure. Patient is currently followed by medicine and cardiology.     Recommendations:  - pending optimization for angio next week due to uptrending Cr  - Now will be receiving HD, please let vascular surgery know whether patient will need long term HD  - If so, preserve non-dominant arm and obtain b/l vein mapping   - Continue ASA/statin  - cards/meds clearence for CO2 angio   - Consented  - Vascular following    Vascular Surgery  [id number]

## 2025-02-21 NOTE — CONSULT NOTE ADULT - PROBLEM SELECTOR RECOMMENDATION 3
-CT chest 1/25 with small b/l pl effusions, small pericardial effusion  -CXR 2/11 with mild basilar atelectasis/effusion   -Keep O>I as tolerated.
-Ischemic cardiomyopathy with Multivessel CAD  -Left ventricular systolic function is severely decreased with an ejection fraction of 20 %   - c/w Bumex gtt   - c/w milrinone gtt
In setting of poor forward flow and diuresis  - HD started on 2/20 as a temporary means, renal following for further management

## 2025-02-21 NOTE — PROGRESS NOTE ADULT - PROBLEM SELECTOR PLAN 1
-continue milrinone 0.25; likely will need milrinone on discharge  - continue to hold diuretics, spironolactone  -check perfusion markers (LFTs, lactate) daily  - not a candidate for advanced therapies given advanced CKD and PAD

## 2025-02-21 NOTE — PROGRESS NOTE ADULT - SUBJECTIVE AND OBJECTIVE BOX
Patient seen and examined at bedside.    Overnight Events: s/p 1 session of HD yesterday    REVIEW OF SYSTEMS:  CONSTITUTIONAL: No weakness, fevers or chills  EYES/ENT: No visual changes;  No dysphagia  NECK: No pain or stiffness  RESPIRATORY: No cough, wheezing, hemoptysis; No shortness of breath  CARDIOVASCULAR: No chest pain or palpitations; No lower extremity edema  GASTROINTESTINAL: No abdominal or epigastric pain. No nausea, vomiting, or hematemesis; No diarrhea or constipation. No melena or hematochezia.  BACK: No back pain  GENITOURINARY: No dysuria, frequency or hematuria  NEUROLOGICAL: No numbness or weakness  SKIN: No itching, burning, rashes, or lesions   All other review of systems is negative unless indicated above.            Current Meds:  acetaminophen     Tablet .. 650 milliGRAM(s) Oral every 6 hours PRN  ampicillin/sulbactam  IVPB 3 Gram(s) IV Intermittent every 12 hours  ampicillin/sulbactam  IVPB      aspirin enteric coated 81 milliGRAM(s) Oral daily  atorvastatin 40 milliGRAM(s) Oral at bedtime  benzocaine/menthol Lozenge 1 Lozenge Oral once  bisacodyl 5 milliGRAM(s) Oral every 12 hours  chlorhexidine 4% Liquid 1 Application(s) Topical <User Schedule>  dextrose 5%. 1000 milliLiter(s) IV Continuous <Continuous>  dextrose 5%. 1000 milliLiter(s) IV Continuous <Continuous>  dextrose 50% Injectable 25 Gram(s) IV Push once  dextrose 50% Injectable 12.5 Gram(s) IV Push once  dextrose 50% Injectable 25 Gram(s) IV Push once  dextrose Oral Gel 15 Gram(s) Oral once PRN  glucagon  Injectable 1 milliGRAM(s) IntraMuscular once  heparin   Injectable 5000 Unit(s) SubCutaneous every 8 hours  insulin glargine Injectable (LANTUS) 9 Unit(s) SubCutaneous at bedtime  insulin lispro (ADMELOG) corrective regimen sliding scale   SubCutaneous three times a day before meals  insulin lispro (ADMELOG) corrective regimen sliding scale   SubCutaneous at bedtime  linagliptin 5 milliGRAM(s) Oral daily  melatonin 3 milliGRAM(s) Oral at bedtime PRN  milrinone Infusion 0.25 MICROgram(s)/kG/Min IV Continuous <Continuous>  mupirocin 2% Ointment 1 Application(s) Both Nostrils two times a day  ondansetron Injectable 4 milliGRAM(s) IV Push every 6 hours PRN  oxyCODONE    IR 5 milliGRAM(s) Oral every 4 hours PRN  pantoprazole  Injectable 40 milliGRAM(s) IV Push daily  polyethylene glycol 3350 17 Gram(s) Oral daily  senna 2 Tablet(s) Oral at bedtime  sodium chloride 0.65% Nasal 1 Spray(s) Both Nostrils three times a day PRN  sodium chloride 0.9% lock flush 10 milliLiter(s) IV Push every 1 hour PRN  sodium chloride 0.9%. 250 milliLiter(s) IV Continuous <Continuous>      Vitals:  T(F): 98.1 (02-21), Max: 98.8 (02-20)  HR: 102 (02-21) (95 - 103)  BP: 108/62 (02-21) (97/60 - 116/65)  RR: 18 (02-21)  SpO2: 94% (02-21)  I&O's Summary    20 Feb 2025 07:01  -  21 Feb 2025 07:00  --------------------------------------------------------  IN: 700 mL / OUT: 1200 mL / NET: -500 mL    21 Feb 2025 07:01  -  21 Feb 2025 12:58  --------------------------------------------------------  IN: 0 mL / OUT: 700 mL / NET: -700 mL        Physical Exam:  GEN: NAD  HEENT: EOMI, clear sclera  PULM: CTA b/l, no wheeze  CV: RRR S1 S2, no murmur, no JVD  ABD: S, NT, ND  EXT: bandaged                          9.5    13.29 )-----------( 329      ( 21 Feb 2025 06:55 )             27.7     02-21    137  |  95[L]  |  66[H]  ----------------------------<  96  3.6   |  23  |  3.38[H]    Ca    9.4      21 Feb 2025 06:55  Phos  5.7     02-20  Mg     2.2     02-20    TPro  8.5[H]  /  Alb  3.4  /  TBili  1.2  /  DBili  x   /  AST  72[H]  /  ALT  103[H]  /  AlkPhos  171[H]  02-21    PT/INR - ( 20 Feb 2025 07:12 )   PT: 15.4 sec;   INR: 1.34 ratio         PTT - ( 20 Feb 2025 07:12 )  PTT:34.6 sec

## 2025-02-21 NOTE — CONSULT NOTE ADULT - ASSESSMENT
64F with CHF (last TTE on 1/16/25 EF 30-35%, G2DD), DM, diabetic foot ulcer with a recent admission at [facility] for CHF exacerbation who presented as a transfer for worsening R. leg pain and fluid secretion, On non-invasive imaging demonstrating severe depletion of RLE blood flow beyond the popliteal vessel at knee and similar findings in L. Was transferred to [facility] for CO2 angiogram with Dr. Baltazar. Hospital course c/b JAMEL in setting of diuresis, pt requiring inotropic support with milrinone gtt, as well as plans for angiogram. Pt started on dialysis on 2/20. Geriatrics and Palliative Medicine Team is consulted at family request for support

## 2025-02-21 NOTE — CONSULT NOTE ADULT - PROBLEM SELECTOR RECOMMENDATION 5
-Per vascular  -Plan for eventual angiogram.
- Consider [facility] referral if family has further requests for improved communication from the hospital   - As goals are identified with a plan of care in place, Geriatrics and Palliative Medicine Team will sign off at this time. Please don't hesitate to call us back if further needs arise    An MD Suzie  [facility] Team Consults  Please call if we can be of assistance, [phone]

## 2025-02-21 NOTE — PROGRESS NOTE ADULT - SUBJECTIVE AND OBJECTIVE BOX
SURGERY DAILY PROGRESS NOTE    24 Hour/Overnight Events: No acute events overnight    SUBJECTIVE: Patient seen and evaluated on AM rounds.  ------------------------------------------------------------------------------------------------------------  OBJECTIVE:  Vital Signs Last 24 Hrs  T(C): 36.8 (21 Feb 2025 04:25), Max: 37.1 (20 Feb 2025 16:35)  T(F): 98.3 (21 Feb 2025 04:25), Max: 98.8 (20 Feb 2025 16:35)  HR: 102 (21 Feb 2025 04:25) (95 - 103)  BP: 116/65 (21 Feb 2025 04:25) (97/60 - 116/65)  BP(mean): --  RR: 18 (21 Feb 2025 04:25) (18 - 22)  SpO2: 93% (21 Feb 2025 04:25) (92% - 95%)    Parameters below as of 21 Feb 2025 04:25  Patient On (Oxygen Delivery Method): room air      I&O's Detail    20 Feb 2025 07:01  -  21 Feb 2025 07:00  --------------------------------------------------------  IN:    Other (mL): 700 mL  Total IN: 700 mL    OUT:    Other (mL): 1200 mL    Voided (mL): 0 mL  Total OUT: 1200 mL    Total NET: -500 mL      21 Feb 2025 07:01  -  21 Feb 2025 09:35  --------------------------------------------------------  IN:  Total IN: 0 mL    OUT:    Ureteral Catheter (mL): 700 mL  Total OUT: 700 mL    Total NET: -700 mL          LABS:                        9.5    13.29 )-----------( 329      ( 21 Feb 2025 06:55 )             27.7     02-21    137  |  95[L]  |  66[H]  ----------------------------<  96  3.6   |  23  |  3.38[H]    Ca    9.4      21 Feb 2025 06:55  Phos  5.7     02-20  Mg     2.2     02-20    TPro  8.5[H]  /  Alb  3.4  /  TBili  1.2  /  DBili  x   /  AST  72[H]  /  ALT  103[H]  /  AlkPhos  171[H]  02-21    LIVER FUNCTIONS - ( 21 Feb 2025 06:55 )  Alb: 3.4 g/dL / Pro: 8.5 g/dL / ALK PHOS: 171 U/L / ALT: 103 U/L / AST: 72 U/L / GGT: x           PT/INR - ( 20 Feb 2025 07:12 )   PT: 15.4 sec;   INR: 1.34 ratio         PTT - ( 20 Feb 2025 07:12 )  PTT:34.6 sec  Urinalysis Basic - ( 21 Feb 2025 06:55 )    Color: x / Appearance: x / SG: x / pH: x  Gluc: 96 mg/dL / Ketone: x  / Bili: x / Urobili: x   Blood: x / Protein: x / Nitrite: x   Leuk Esterase: x / RBC: x / WBC x   Sq Epi: x / Non Sq Epi: x / Bacteria: x      Physical Exam:  General: NAD, resting in bed comfortably  Cardiac: Regular rate, normotensive  Respiratory: Nonlabored respirations, normal cw expansion, on RA  Neuro: AOx3, CNII-XII intact on gross examination  Vascular/Extremities: Warm, well perfused        RLE: Dressings in place, blistering and ulceration on the dorsal aspect of the foot        LLE: First digit dry gangrene on the plantar aspec

## 2025-02-21 NOTE — CONSULT NOTE ADULT - TIME BILLING
Symptom assessment and management, supportive counseling, coordination of care
- Review of records, telemetry, vital signs and daily labs.   - General and cardiovascular physical examination.  - Generation of cardiovascular treatment plan and completion of note .  - Coordination of care.      Patient was seen and examined by me on  01/30/2025 ,interim events noted,labs and radiology studies reviewed.  Yordy Gilmore MD,FACC.  [address] [facility].  [phone]  Availabe to call or text on [employer] TEAMS.

## 2025-02-21 NOTE — CONSULT NOTE ADULT - PROBLEM SELECTOR RECOMMENDATION 9
Nuclear scan suggests reversible ischemia  Resolution of ischemic bullae, LE cellulitis and imperiling limb ischemia precludes proceeding with CABG  Pt has uncontrolled diabetes and poor functional status at home requiring assistance for ALL functional activities/mobility, and rolling walker.    Comorbidities such as uncontrolled DM, ATN, CVA  increase risk of complications such as postoperative acute renal failure and cognitive decline portend a poor prognosis overall.  Preop work up in progress; Vein mapping, mri cardiac viability  GDMT on hold 2/2 JAMEL
-Primarily with exertion & when laying flat. Pt denies SOB at rest  -Suspect 2nd to fluid overload, underlying CHF  -Keep O>I as tolerated  -VBG 2/14 acceptable  -ABX per primary team for LE wounds  -Keep sats >90% with O2 PRN (currently RA).
Hx of HF, previous admission in January, on home Lasix 40 qD, Metoprolol Succ 25 qD. Not on Entresto due to insurance issues  Last TTE: LVSF moderately decreased w/ EF 30-35 %. Moderate G2DD. Mild MR  GDMT on hold 2/2 JAMEL    Repeat TTE EF 20% with WMA  Likely Ischemic cardiomyopathy despite NST revealing fixed defect, LHC confirming  Multivessel CAD  - cardiology and CHF teams following, pt is on milrinone gtt for inotropic support, further management as per primary and consulting teams

## 2025-02-21 NOTE — PROGRESS NOTE ADULT - SUBJECTIVE AND OBJECTIVE BOX
[facility] NEPHROLOGY- PROGRESS NOTE    64y Female with history of CHF presents as a transfer for LE CO2 angiogram. Nephrology consulted for elevated Scr.    Pain in lower extremities has decreased. Denies any acute complaints.    REVIEW OF SYSTEMS:  Gen: no fevers  Cards: no chest pain  Resp: + dyspnea with exertion, + cough  GI: no nausea and vomiting, no diarrhea  Vascular: Stable LE edema.  Msk: Persistent pain upon moving legs.    No Known Allergies      Hospital Medications: Medications reviewed    VITALS:  T(F): 98.1 (02-21-25 @ 11:16), Max: 98.8 (02-20-25 @ 16:35)  HR: 102 (02-21-25 @ 11:16)  BP: 108/62 (02-21-25 @ 11:16)  RR: 18 (02-21-25 @ 11:16)  SpO2: 94% (02-21-25 @ 11:16)  Wt(kg): --    02-20 @ 07:01  -  02-21 @ 07:00  --------------------------------------------------------  IN: 700 mL / OUT: 1200 mL / NET: -500 mL    02-21 @ 07:01  -  02-21 @ 12:56  --------------------------------------------------------  IN: 0 mL / OUT: 700 mL / NET: -700 mL      PHYSICAL EXAM:    Gen: NAD, calm  Cards: RRR, +S1/S2, no M/G/R  Resp: bibasilar rales  GI: soft, NT/ND, NABS  Vascular: 2+ RLE edema > LLE edema with legs wrapped    LABS:  02-21    137  |  95[L]  |  66[H]  ----------------------------<  96  3.6   |  23  |  3.38[H]    Ca    9.4      21 Feb 2025 06:55  Phos  5.7     02-20  Mg     2.2     02-20    TPro  8.5[H]  /  Alb  3.4  /  TBili  1.2  /  DBili      /  AST  72[H]  /  ALT  103[H]  /  AlkPhos  171[H]  02-21    Creatinine Trend: 3.38 <--, 4.76 <--, 4.07 <--, 3.49 <--, 2.93 <--, 2.95 <--, 2.81 <--, 2.80 <--, 2.74 <--, 2.40 <--                        9.5    13.29 )-----------( 329      ( 21 Feb 2025 06:55 )             27.7     Urine Studies:  Urinalysis Basic - ( 21 Feb 2025 06:55 )    Color:  / Appearance:  / SG:  / pH:   Gluc: 96 mg/dL / Ketone:   / Bili:  / Urobili:    Blood:  / Protein:  / Nitrite:    Leuk Esterase:  / RBC:  / WBC    Sq Epi:  / Non Sq Epi:  / Bacteria:         RADIOLOGY AND ADDITIONAL STUDIES:      < from: MR Cardiac w/wo IV Cont (02.19.25 @ 09:44) >  IMPRESSION:.    The left ventricle demonstrates severe wall motion abnormalities and   function is depressed (calculated LVEF is 25%).    There is greater than 75% subendocardial scarring involving the basal to   mid inferior wall of the left ventricle.    There is left ventricular transmural scarring involving the apical septal   wall and likely near transmural scarring of the apical lateral wall.    There is about 50% scarring of the left ventricular basal inferoseptal   wall.    < end of copied text >    < from: Xray Chest 1 View- PORTABLE-Routine (Xray Chest 1 View- PORTABLE-Routine .) (02.17.25 @ 22:49) >  Frontal expiratory view of the chest shows the heart to be similar in   size. The lungs show progression of pulmonary congestion with small right   effusion and there is no evidence of pneumothorax nor definite left   pleural effusion.    < end of copied text >    < from: US Kidney and Bladder (01.30.25 @ 12:47) >  FINDINGS:  Right kidney: 11.7 cm. No renal mass, hydronephrosis or calculi.    Left kidney: 10.9 cm. No renal mass, hydronephrosis or calculi.    Urinary bladder: Underdistended.    Miscellaneous: Bilateral pleural effusion.    < end of copied text >      < from: TTE W or WO Ultrasound Enhancing Agent (02.10.25 @ 12:09) >  CONCLUSIONS:      1. Left ventricular cavity is mildly dilated. Left ventricular systolic function is severely decreased with an ejection fraction of 20 % by Winchester's method of disks. Regional wall motion abnormalities present.   2. Multiple segmental abnormalities exist. See findings.   3. Reduced right ventricular systolic function.   4. No pericardial effusion seen.   5. Compared to the transthoracic echocardiogram performed on 1/16/2025, Worsening of the left ventricular function.    < end of copied text >

## 2025-02-21 NOTE — PROGRESS NOTE ADULT - SUBJECTIVE AND OBJECTIVE BOX
MR#[id number]  PATIENT NAME:COLE ALBA    DATE OF SERVICE: 25 @ 06:42  Patient was seen and examined by Yordy Gilmore MD on    25 @ 06:42 .  Interim events noted.Consultant notes ,Labs,Telemetry reviewed by me       HOSPITAL COURSE: HPI:  64F, hx of CHF (last TTE on 25 EF 30-35%, G2DD), DM, diabetic foot ulcer with a recent admission at [facility] for CHF exacerbation presented as a transfer for worsening R. leg pain and fluid secretion, On non-invasive imaging demonstrating severe depletion of RLE blood flow beyond the popliteal vessel at knee and similar findings in L. Was transferred to [facility] for CO2 angiogram with Dr. Baltazar. (2025 20:05)          INTERIM EVENTS:Patient seen at bedside ,interim events noted.  -on  5mcg and Bumex gtt   02/10-OOB still orthopneac [id number] LE weakness and swelling  -c/o LE pain not dyspneac  held on Bumex 3 mg/Hr  -Awake seen by HF   -Remains on Bumex 3mg/Hr for possible LHC/RHC  -Had cath Multivessel CAD started on Milrinone for CTS evaluation albeit targets not optimal  02/15-Awake on Milrinone and Bumex gtt-seen by CTS not a surgical candidate-needs Vascular work-up for LE PAD-scheduled for Angiogram on Wednesday    Weight 170Kg---> 164 Kg--->161.1 Kg  -Awake is able to lie flat c/o RLE pain Sinus Rhythm UOP-2750mL,on Milrinone 0.25mcg Bumex 3mg/Hr,Metolazone 10mg  -Awake is able to lie flat remains on Milrinone and Bumex 3mg/Hr UOP-1500mL-  -Awake no chest pain or dyspnea at rest Hypertonic saline stopped Na 150,Bumex held remains on Milrinone gtt Sinus rhythm  UOP-1425mL  -Awake is off diuretics for cMR and CO2 angiogram  -Had cMD Plan for HD catheter placement to initiate Dialysis  -s/p HD catheter     MEDICATIONS  (STANDING):  ampicillin/sulbactam  IVPB 3 Gram(s) IV Intermittent every 12 hours  ampicillin/sulbactam  IVPB      aspirin enteric coated 81 milliGRAM(s) Oral daily  atorvastatin 40 milliGRAM(s) Oral at bedtime  benzocaine/menthol Lozenge 1 Lozenge Oral once  bisacodyl 5 milliGRAM(s) Oral every 12 hours  chlorhexidine 4% Liquid 1 Application(s) Topical <User Schedule>  glucagon  Injectable 1 milliGRAM(s) IntraMuscular once  heparin   Injectable 5000 Unit(s) SubCutaneous every 8 hours  insulin glargine Injectable (LANTUS) 9 Unit(s) SubCutaneous at bedtime  insulin lispro (ADMELOG) corrective regimen sliding scale   SubCutaneous at bedtime  insulin lispro (ADMELOG) corrective regimen sliding scale   SubCutaneous three times a day before meals  linagliptin 5 milliGRAM(s) Oral daily  milrinone Infusion 0.25 MICROgram(s)/kG/Min (5.41 mL/Hr) IV Continuous <Continuous>  mupirocin 2% Ointment 1 Application(s) Both Nostrils two times a day  pantoprazole  Injectable 40 milliGRAM(s) IV Push daily  polyethylene glycol 3350 17 Gram(s) Oral daily  senna 2 Tablet(s) Oral at bedtime  sodium chloride 0.9%. 250 milliLiter(s) (50 mL/Hr) IV Continuous <Continuous>    MEDICATIONS  (PRN):  acetaminophen     Tablet .. 650 milliGRAM(s) Oral every 6 hours PRN Mild Pain (1 - 3)  dextrose Oral Gel 15 Gram(s) Oral once PRN Blood Glucose LESS THAN 70 milliGRAM(s)/deciliter  melatonin 3 milliGRAM(s) Oral at bedtime PRN Insomnia  ondansetron Injectable 4 milliGRAM(s) IV Push every 6 hours PRN Nausea and/or Vomiting  oxyCODONE    IR 5 milliGRAM(s) Oral every 4 hours PRN Severe Pain (7 - 10)  sodium chloride 0.65% Nasal 1 Spray(s) Both Nostrils three times a day PRN Dryness  sodium chloride 0.9% lock flush 10 milliLiter(s) IV Push every 1 hour PRN Pre/post blood products, medications, blood draw, and to maintain line patency            REVIEW OF SYSTEMS:  Constitutional: [ ] fever, [ ]weight loss,  [x ]fatigue [ ]weight gain  Eyes: [ ] visual changes  Respiratory: [x ]shortness of breath;  [ ] cough, [ ]wheezing, [ ]chills, [ ]hemoptysis  Cardiovascular: [ ] chest pain, [ ]palpitations, [ ]dizziness,  [ ]leg swelling[ ]orthopnea[ ]PND  Gastrointestinal: [ ] abdominal pain, [ ]nausea, [ ]vomiting,  [ ]diarrhea [ ]Constipation [ ]Melena  Genitourinary: [ ] dysuria, [ ] hematuria [ ]Mont[place]  Neurologic: [ ] headaches [ ] tremors[ ]weakness [ ]Paralysis Right[ ] Left[ ]  Skin: [ ] itching, [ ]burning, [ ] rashes  Endocrine: [ ] heat or cold intolerance  Musculoskeletal: [ ] joint pain or swelling; [ ] muscle, back, or extremity pain  Psychiatric: [ ] depression, [ ]anxiety, [ ]mood swings, or [ ]difficulty sleeping  Hematologic: [ ] easy bruising, [ ] bleeding gums    [ ] All remaining systems negative except as per above.   [ ]Unable to obtain.  [x] No change in ROS since admission      Vital Signs Last 24 Hrs  T(C): 36.8 (2025 04:25), Max: 37.1 (2025 16:35)  T(F): 98.3 (2025 04:25), Max: 98.8 (2025 16:35)  HR: 102 (2025 04:25) (95 - 103)  BP: 116/65 (2025 04:25) (97/60 - 117/72)  RR: 18 (2025 04:25) (17 - 22)  SpO2: 93% (2025 04:25) (92% - 95%)    Parameters below as of 2025 04:25  Patient On (Oxygen Delivery Method): room air      I&O's Summary    2025 07:01  -  2025 07:00  --------------------------------------------------------  IN: 232.4 mL / OUT: 550 mL / NET: -317.6 mL    2025 07:01  -  2025 06:42  --------------------------------------------------------  IN: 700 mL / OUT: 1200 mL / NET: -500 mL        PHYSICAL EXAM:  General: No acute distress BMI-24  HEENT: EOMI, PERRL  Neck: Supple, [ ] JVD  Lungs: Equal air entry bilaterally; [ ] rales [ ] wheezing [ ] rhonchi  Heart: Regular rate and rhythm; [x ] murmur   2/6 [ x] systolic [ ] diastolic [ ] radiation[ ] rubs [ ]  gallops  Abdomen: Nontender, bowel sounds present  Extremities: No clubbing, cyanosis, [ ] edema [x ]]Bilateral lower extremity venous stasis wounds to dermis, mild serous drainage, no acute signs of infection. Left foot hallux distal tuft well adhered eschar, no acute signs of infection.   Nervous system:  Alert & Oriented X3, no focal deficits  Psychiatric: Normal affect  Skin: No rashes or lesions    LABS:      137  |  93[L]  |  94[H]  ----------------------------<  179[H]  4.4   |  21[L]  |  4.76[H]    Ca    9.3      2025 07:12  Phos  5.7       Mg     2.2         TPro  8.0  /  Alb  3.5  /  TBili  0.9  /  DBili  0.4[H]  /  AST  109[H]  /  ALT  125[H]  /  AlkPhos  173[H]      Creatinine Trend: 4.76<--, 4.07<--, 3.49<--, 2.93<--, 2.95<--, 2.81<--                        8.7    14.50 )-----------( 383      ( 2025 07:12 )             25.3     PT/INR - ( 2025 07:12 )   PT: 15.4 sec;   INR: 1.34 ratio         PTT - ( 2025 07:12 )  PTT:34.6 sec        TTE Limited W or WO Ultrasound Enhancing Agent (25 @ 12:39) >  CONCLUSIONS:      1. Limited TTE to assess for MR, IVC, LVOT, TR.   2. Left ventricular systolic function is severely decreased.   3. Reduced right ventricular systolic function.   4. LVOT VTI 12.0cm, Stroke volume 35cc. LVOT diameter 1.9cm.   5. Mild to moderate tricuspid regurgitation.   6. Estimated pulmonary artery systolic pressure is 32 mmHg, consistent with normal pulmonary artery pressure.   7. The inferior vena cava is normal in size measuring 1.70 cm in diameter, (normal <2.1cm) with abnormal inspiratory collapse (abnormal <50%) consistent with mildly elevated right atrial pressure (~8, range 5-10mmHg).   8. Compared to the transthoracic echocardiogram performed on 2/10/2025, RV and LV function still .             TTE W or WO Ultrasound Enhancing Agent (02.10.25 @ 12:09) >  CONCLUSIONS:      1. Left ventricular cavity is mildly dilated. Left ventricular systolic function is severely decreased with an ejection fraction of 20 % by Winchester's method of disks. Regional wall motion abnormalities present.   2. Multiple segmental abnormalities exist. See findings.   The entire septum, entire apex, entire inferior wall, mid inferolateral segment, and mid anterolateral segment are hypokinetic. All remaining scored segments are normal.     3. Reduced right ventricular systolic function.   4. No pericardial effusion seen.   5. Compared to the transthoracic echocardiogram performed on 2025, Worsening of the left ventricular function.        VA Duplex Lower Ext Vein Scan, Bilat (02.10.25 @ 17:42) >  IMPRESSION: No evidence of bilateral DVT within the imaged veins.        Nuclear Stress Test-Pharmacologic.. (25 @ 10:31) >  Conclusions:   1. Myocardial Perfusion: Abnormal.   2. Qualitative Perfusion:      - small-sized, moderate defect(s) in the apical wall that is predominantly fixed suggestive of an infarction with minimal marcus-infarct ischemia.   3. The post stress left ventricular EF is 25 %. The stress end diastolic volume is 118 ml.   4. Results D/Wcardiologist Dr. Gilmore at 18:31        RHC: RA 20, PA 49/29 (40), PCWP 35, mVO2 51.1, CO/CI 3.8/2.2, SVR 1095  LHC: RCA , severe ostial LM disease, diffuse disease in LAD and LCx, some L to R collaterals        MR Cardiac w/wo IV Cont (25 @ 09:44) >  IMPRESSION:.    The left ventricle demonstrates severe wall motion abnormalities and  function is depressed (calculated LVEF is 25%).    There is greater than 75% subendocardial scarring involving the basal to  mid inferior wall of the left ventricle.    There is left ventricular transmural scarring involving the apical septal  wall and likely near transmural scarring of the apical lateral wall.    There is about 50% scarring of the left ventricular basal inferoseptal  wall.

## 2025-02-21 NOTE — CONSULT NOTE ADULT - SUBJECTIVE AND OBJECTIVE BOX
HPI:  64F with CHF (last TTE on 25 EF 30-35%, G2DD), DM, diabetic foot ulcer with a recent admission at [facility] for CHF exacerbation who presented as a transfer for worsening R. leg pain and fluid secretion, On non-invasive imaging demonstrating severe depletion of RLE blood flow beyond the popliteal vessel at knee and similar findings in L. Was transferred to [facility] for CO2 angiogram with Dr. Baltazar. Hospital course c/b JAMEL in setting of diuresis, pt requiring inotropic support with milrinone gtt, as well as plans for angiogram. Pt started on dialysis on . Geriatrics and Palliative Medicine Team is consulted at family request for support    Patient seen this morning, awake and alert, with brother at bedside. She is awake and alert but at times slow to answer questions. She reports not feeling well in general, and a worsening sense of doom since coming to [facility]. She admits to having pain in her lower extremities. She defers further questioning and decision making to her brother Galo. Please see GOC section below for discussion details with pt's brother Galo.      PERTINENT PM/SXH:   DM (diabetes mellitus)    HTN (hypertension)    Cataract of both eyes, unspecified cataract type    Diabetic foot ulcer    Congestive heart failure (CHF)    CAD (coronary artery disease)      No significant past surgical history    Cataract of both eyes, unspecified cataract type    History of cholecystectomy      FAMILY HISTORY:  Family history of CHF (congestive heart failure) (Mother)      Family Hx substance abuse [ ]yes [ ]no  ITEMS NOT CHECKED ARE NOT PRESENT    SOCIAL HISTORY:   Significant other/partner[ ]  Children[ ]  Yarsani/Spirituality:  Substance hx:  [ ]   Tobacco hx:  [ ]   Alcohol hx: [ ]   Home Opioid hx:  [ ] I-Stop Reference No:  Living Situation: [x ]Home  [ ]Long term care  [ ]Rehab [ ]Other    ADVANCE DIRECTIVES:    DNR/MOLST  [ ]  Living Will  [ ]   DECISION MAKER(s):  [ ] Health Care Proxy(s)  [x ] Surrogate(s)  [ ] Guardian           Name(s): Phone Number(s): Galo (brother)    BASELINE (I)ADL(s) (prior to admission):  [place]: [ ]Total  [x ] Moderate [ ]Dependent    Allergies    No Known Allergies    Intolerances    Augmentin (Stomach Upset; Vomiting; Nausea)  MEDICATIONS  (STANDING):  ampicillin/sulbactam  IVPB 3 Gram(s) IV Intermittent every 12 hours  ampicillin/sulbactam  IVPB      aspirin enteric coated 81 milliGRAM(s) Oral daily  atorvastatin 40 milliGRAM(s) Oral at bedtime  benzocaine/menthol Lozenge 1 Lozenge Oral once  bisacodyl 5 milliGRAM(s) Oral every 12 hours  chlorhexidine 4% Liquid 1 Application(s) Topical <User Schedule>  dextrose 5%. 1000 milliLiter(s) (100 mL/Hr) IV Continuous <Continuous>  dextrose 5%. 1000 milliLiter(s) (50 mL/Hr) IV Continuous <Continuous>  dextrose 50% Injectable 25 Gram(s) IV Push once  dextrose 50% Injectable 12.5 Gram(s) IV Push once  dextrose 50% Injectable 25 Gram(s) IV Push once  glucagon  Injectable 1 milliGRAM(s) IntraMuscular once  heparin   Injectable 5000 Unit(s) SubCutaneous every 8 hours  insulin glargine Injectable (LANTUS) 9 Unit(s) SubCutaneous at bedtime  insulin lispro (ADMELOG) corrective regimen sliding scale   SubCutaneous three times a day before meals  insulin lispro (ADMELOG) corrective regimen sliding scale   SubCutaneous at bedtime  linagliptin 5 milliGRAM(s) Oral daily  milrinone Infusion 0.25 MICROgram(s)/kG/Min (5.41 mL/Hr) IV Continuous <Continuous>  mupirocin 2% Ointment 1 Application(s) Both Nostrils two times a day  pantoprazole  Injectable 40 milliGRAM(s) IV Push daily  polyethylene glycol 3350 17 Gram(s) Oral daily  senna 2 Tablet(s) Oral at bedtime  sodium chloride 0.9%. 250 milliLiter(s) (50 mL/Hr) IV Continuous <Continuous>    MEDICATIONS  (PRN):  acetaminophen     Tablet .. 650 milliGRAM(s) Oral every 6 hours PRN Mild Pain (1 - 3)  dextrose Oral Gel 15 Gram(s) Oral once PRN Blood Glucose LESS THAN 70 milliGRAM(s)/deciliter  HYDROmorphone   Solution 1 milliGRAM(s) Oral every 6 hours PRN Severe Pain (7 - 10)  melatonin 3 milliGRAM(s) Oral at bedtime PRN Insomnia  ondansetron Injectable 4 milliGRAM(s) IV Push every 6 hours PRN Nausea and/or Vomiting  sodium chloride 0.65% Nasal 1 Spray(s) Both Nostrils three times a day PRN Dryness  sodium chloride 0.9% lock flush 10 milliLiter(s) IV Push every 1 hour PRN Pre/post blood products, medications, blood draw, and to maintain line patency    PRESENT SYMPTOMS: [ ]Unable to self-report  [ ] CPOT [ ] PAINADs [ ] RDOS  Source if other than patient:  [ ]Family   [ ]Team     Pain: [ x]yes [ ]no  QOL impact -   Location - lower extremities                    Aggravating factors -   Quality -  Radiation -  Timing-  Severity (0-10 scale):  Minimal acceptable level (0-10 scale):     Dyspnea:                           [ ]Mild [ ]Moderate [ ]Severe  Anxiety:                             [ ]Mild [ ]Moderate [ ]Severe  Fatigue:                             [ ]Mild [ ]Moderate [ ]Severe  Nausea:                             [ ]Mild [ ]Moderate [ ]Severe  Loss of appetite:              [ ]Mild [ ]Moderate [ ]Severe  Constipation:                    [ ]Mild [ ]Moderate [ ]Severe    PCSSQ[Palliative Care Spiritual Screening Question]   Severity (0-10):  Score of 4 or > indicate consideration of Chaplaincy referral.  Chaplaincy Referral: [ ] yes [ ] refused [ ] following [x ] Deferred     Caregiver [place]? : [x ] yes [ ] no [ ] Deferred [ ] Declined             Social work referral [ ] Patient & Family Centered Care Referral [ x]     Anticipatory Grief present?:  [ ] yes [x ] no  [ ] Deferred                  Social work referral [ ] Chaplaincy Referral[ ]      Other Symptoms:  [x ]All other review of systems negative     Palliative Performance Status Version 2:    50     %    http://npcrc.org/files/news/palliative_performance_scale_ppsv2.pdf    PHYSICAL EXAM:  Vital Signs Last 24 Hrs  T(C): 36.7 (2025 11:16), Max: 37.1 (2025 16:35)  T(F): 98.1 (2025 11:16), Max: 98.8 (2025 16:35)  HR: 102 (2025 11:16) (100 - 103)  BP: 108/62 (2025 11:16) (97/60 - 116/65)  BP(mean): --  RR: 18 (2025 11:16) (18 - 18)  SpO2: 94% (2025 11:16) (93% - 95%)    Parameters below as of 2025 11:16  Patient On (Oxygen Delivery Method): room air     I&O's Summary    2025 07:01  -  2025 07:00  --------------------------------------------------------  IN: 700 mL / OUT: 1200 mL / NET: -500 mL    2025 07:01  -  2025 15:12  --------------------------------------------------------  IN: 350 mL / OUT: 700 mL / NET: -350 mL      GENERAL: [ ]Cachexia    [x ]Alert  [x ]Oriented x 3  [ ]Lethargic  [ ]Unarousable  [ x]Verbal  [ ]Non-Verbal  Behavioral:   [ ] Anxiety  [ ] Delirium [ ] Agitation [ ] Other  HEENT:  [x ]Normal   [ ]Dry mouth   [ ]ET Tube/Trach  [ ]Oral lesions  PULMONARY:   [ ]Clear [ ]Tachypnea  [ ]Audible excessive secretions   [ ]Rhonchi        [ ]Right [ ]Left [ ]Bilateral  [ ]Crackles        [ ]Right [ ]Left [ ]Bilateral  [ ]Wheezing     [ ]Right [ ]Left [ ]Bilateral  [x ]Diminished breath sounds [ ]right [ ]left [ x]bilateral  CARDIOVASCULAR:    [ x]Regular [ ]Irregular [ ]Tachy  [ ]Arias [ ]Murmur [ ]Other  GASTROINTESTINAL:  [x ]Soft  [ ]Distended   [ x]+BS  [x ]Non tender [ ]Tender  [ ]Other [ ]PEG [ ]OGT/ NGT  Last BM:  GENITOURINARY:  [ ]Normal [x ] Incontinent   [ ]Oliguria/Anuria   [ ]Montgomery  MUSCULOSKELETAL:   [ ]Normal   [ ]Weakness  [ x]Bed/Wheelchair bound [ ]Edema  NEUROLOGIC:   [ ]No focal deficits  [ ]Cognitive impairment  [ ]Dysphagia [ ]Dysarthria [ ]Paresis [ ]Other   SKIN:   [ ]Normal  [ ]Rash  [ ]Other  [ ]Pressure ulcer(s)       Present on admission [ ]y [ ]n    CRITICAL CARE:  [ ] Shock Present  [ ]Septic [ ]Cardiogenic [ ]Neurologic [ ]Hypovolemic  [ ]  Vasopressors [ ]  Inotropes   [ ]Respiratory failure present [ ]Mechanical ventilation [ ]Non-invasive ventilatory support [ ]High flow    [ ]Acute  [ ]Chronic [ ]Hypoxic  [ ]Hypercarbic [ ]Other  [ ]Other organ failure     LABS:                        9.5    13.29 )-----------( 329      ( 2025 06:55 )             27.7   02-    137  |  95[L]  |  66[H]  ----------------------------<  96  3.6   |  23  |  3.38[H]    Ca    9.4      2025 06:55  Phos  5.7     02-20  Mg     2.2     02-20    TPro  8.5[H]  /  Alb  3.4  /  TBili  1.2  /  DBili  x   /  AST  72[H]  /  ALT  103[H]  /  AlkPhos  171[H]  02-21  PT/INR - ( 2025 07:12 )   PT: 15.4 sec;   INR: 1.34 ratio         PTT - ( 2025 07:12 )  PTT:34.6 sec    Urinalysis Basic - ( 2025 06:55 )    Color: x / Appearance: x / SG: x / pH: x  Gluc: 96 mg/dL / Ketone: x  / Bili: x / Urobili: x   Blood: x / Protein: x / Nitrite: x   Leuk Esterase: x / RBC: x / WBC x   Sq Epi: x / Non Sq Epi: x / Bacteria: x      RADIOLOGY & ADDITIONAL STUDIES:    < from: MR Cardiac w/wo IV Cont (25 @ 09:44) >  IMPRESSION:.    The left ventricle demonstrates severe wall motion abnormalities and   function is depressed (calculated LVEF is 25%).    There is greater than 75% subendocardial scarring involving the basal to   mid inferior wall of the left ventricle.    There is left ventricular transmural scarring involving the apical septal   wall and likely near transmural scarring of the apical lateral wall.    There is about 50% scarring of the left ventricular basal inferoseptal   wall.    --- End of Report ---    < end of copied text >  < from: TTE Limited W or WO Ultrasound Enhancing Agent (25 @ 12:39) >  CONCLUSIONS:      1. Limited TTE to assess for MR, IVC, LVOT, TR.   2. Left ventricular systolic function is severely decreased.   3. Reduced right ventricular systolic function.   4. LVOT VTI 12.0cm, Stroke volume 35cc. LVOT diameter 1.9cm.   5. Mild to moderate tricuspid regurgitation.   6. Estimated pulmonary artery systolic pressure is 32 mmHg, consistent with normal pulmonary artery pressure.   7. The inferior vena cava is normal in size measuring 1.70 cm in diameter, (normal <2.1cm) with abnormal inspiratory collapse (abnormal <50%) consistent with mildly elevated right atrial pressure (~8, range 5-10mmHg).   8. Compared to the transthoracic echocardiogram performed on 2/10/2025, RV and LV function still .    ________________________________________________________________________________________    < end of copied text >      PROTEIN CALORIE MALNUTRITION PRESENT: [ ]mild [ ]moderate [ ]severe [ ]underweight [ ]morbid obesity  https://www.andeal.org/vault/2440/web/files/ONC/Table_Clinical%20Characteristics%20to%20Document%20Malnutrition-White%20JV%20et%20al%2020.pdf    Height (cm): 162.6 (25 @ 13:28), 165.1 (25 @ 09:50), 165.1 (25 @ 11:11)  Weight (kg): 72.1 (25 @ 13:28), 78.3 (25 @ 09:50), 80.7 (25 @ 11:11)  BMI (kg/m2): 27.3 (25 @ 13:28), 28.7 (25 @ 09:50), 29.6 (25 @ 11:11)    [ ]PPSV2 < or = to 30% [ ]significant weight loss  [ ]poor nutritional intake  [ ]anasarca[ ]Artificial Nutrition      Other REFERRALS:  [ ]Hospice  [ ]Child Life  [ ]Social Work  [ ]Case management [ ]Holistic Therapy

## 2025-02-21 NOTE — PROGRESS NOTE ADULT - ASSESSMENT
64F, hx of CHF (last TTE on 1/16/25 EF 30-35%, G2DD), DM, diabetic foot ulcer with a recent admission at [facility] for CHF exacerbation 1/14-1/17 p/w worsening R. leg pain and fluid secretion, was meeting sepsis criteria with podiatry having low suspicion for right cellulitis and admitted for continued management of HF exacerbation and needing ischemic eval.     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  -Left leg Arterial Duplex: Slightly tardus parvus waveform of the left popliteal artery is noted, for which underlying disease cannot be excluded. Posterior tibial artery waveform is nonpulsatile. Anterior tibial artery tardus parvus flow is noted.   Transferred to [facility] for CO2 angiogram as per vascular surgery    #  PAD Wound of lower extremity.   ·  Plan: Podiatry following, b/l serous bullae, no concerns for infection  Found to have RLE coolness  -Right Leg Arterial Duplex: Popliteal artery is occluded with negligible flow of right trifurcation arteries.  -Left leg Arterial Duplex: Slightly tardus parvus waveform of the left popliteal artery is noted, for which underlying disease cannot be excluded. Posterior tibial artery waveform is nonpulsatile. Anterior tibial artery tardus parvus flow is noted.  -Started on heparin gtt and dobutamine gtt -Will transfer to [facility] for CO2 angiogram as per vascular surgery as not candidate for CTA due to worsening SCr  -Keep compression dressing  -LE elevation above heart level at rest.  -Transferred to [facility] for CO2 angiogram   - Overall this patient is at   intermediate  risk (for cardiac death, nonfatal myocardial infarction, and nonfatal cardiac arrest perioperatively for this intermediate  risk procedure).   No cardiac contraindications for CO2 vangiogram  There  are  no further recommendation for risk stratifying imaging/stress testing prior to planned surgery  Seen by Podiatry-No acute pod intervention, local wound care only- Pod stable for discharge   PAD with rest ischemia plan for Angiogram Wednesday-Dr Louis No cardiac contraindications for procedding with CO2 Anfgiogram        # HFrEF (congestive heart failure). Ischemic Cardiomyopathy  ·  Plan: Hx of HF, previous admission in January, on home Lasix 40 qD, Metoprolol Succ 25 qD. Not on Entresto due to insurance issues  Last TTE: LVSF moderately decreased w/ EF 30-35 %. Moderate G2DD. Mild MR  Stress test: small-sized, moderate defect(s) in the apical wall that is predominantly fixed suggestive of an infarction with minimal marcus-infarct ischemia  Ischemic cardiomyopathy  GDMT on hold 2/2 JAMEL    Repeat TTE EF 20% with WMA  Likely Ischemic cardiomyopathy despite NST revealing fixed defectLHC confirming  Multivessel CAD        RHC: RA 20, PA 49/29 (40), PCWP 35, mVO2 51.1, CO/CI 3.8/2.2, SVR 1095    # CAD  LHC-RCA , severe ostial LM disease, diffuse disease in LAD and LCx, some L to R collaterals-seen by CTS advise cMR for viability poor target vessels for CABG-?High risk LM/LAD PCI  Had cMR-LVEF is 25%, greater than 75% subendocardial scarring involving the basal to mid inferior wall of the left ventricle, left ventricular transmural scarring involving the apical septal wall and likely near transmural scarring of the apical lateral wall. 50% scarring of the left ventricular basal inferoseptal wall.  Will need High risk PCI vs CABG though less likely 2/2 poor target vessel      Started on Milrinone to assist augmented diuresis in attempt to avoid further renal failure  Has lost Weight 170---> 164 Kg---> 161.1 Kg---> 165 Kg  Creatinine gradually rising  Diuretic holiday  For HD catheter placement to initiate Dialysis  02/21-Started HD      LE EDEMA no DVT      # JAMEL  Creatinine Trend:  4.76<--4.07<--, 3.49<--, 2.93<--2.95 <--2.81<--, 2.80<--, 2.74<--2.40<-- 2.37<--2.50<-- 2.98<--, 3.31<--, 2.65<--, 3.90<-- 1.17  Renal function worsening     64F, hx of CHF (last TTE on 1/16/25 EF 30-35%, G2DD), DM, diabetic foot ulcer with a recent admission at [facility] for CHF exacerbation 1/14-1/17 p/w worsening R. leg pain and fluid secretion, was meeting sepsis criteria with podiatry having low suspicion for right cellulitis and admitted for continued management of HF exacerbation and needing ischemic eval.     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  -Left leg Arterial Duplex: Slightly tardus parvus waveform of the left popliteal artery is noted, for which underlying disease cannot be excluded. Posterior tibial artery waveform is nonpulsatile. Anterior tibial artery tardus parvus flow is noted.   Transferred to [facility] for CO2 angiogram as per vascular surgery    #  PAD Wound of lower extremity.   ·  Plan: Podiatry following, b/l serous bullae, no concerns for infection  Found to have RLE coolness  -Right Leg Arterial Duplex: Popliteal artery is occluded with negligible flow of right trifurcation arteries.  -Left leg Arterial Duplex: Slightly tardus parvus waveform of the left popliteal artery is noted, for which underlying disease cannot be excluded. Posterior tibial artery waveform is nonpulsatile. Anterior tibial artery tardus parvus flow is noted.  -Started on heparin gtt and dobutamine gtt -Will transfer to [facility] for CO2 angiogram as per vascular surgery as not candidate for CTA due to worsening SCr  -Keep compression dressing  -LE elevation above heart level at rest.  -Transferred to [facility] for CO2 angiogram   - Overall this patient is at   intermediate  risk (for cardiac death, nonfatal myocardial infarction, and nonfatal cardiac arrest perioperatively for this intermediate  risk procedure).   No cardiac contraindications for CO2 vangiogram  There  are  no further recommendation for risk stratifying imaging/stress testing prior to planned surgery  Seen by Podiatry-No acute pod intervention, local wound care only- Pod stable for discharge   PAD with rest ischemia plan for Angiogram -Dr Louis No cardiac contraindications for procedding with CO2 Angiogram,now that patient is on HD can get peripheral IV contrast angiogram-She is optimized for procedure        # HFrEF (congestive heart failure). Ischemic Cardiomyopathy  ·  Plan: Hx of HF, previous admission in January, on home Lasix 40 qD, Metoprolol Succ 25 qD. Not on Entresto due to insurance issues  Last TTE: LVSF moderately decreased w/ EF 30-35 %. Moderate G2DD. Mild MR  Stress test: small-sized, moderate defect(s) in the apical wall that is predominantly fixed suggestive of an infarction with minimal marcus-infarct ischemia  Ischemic cardiomyopathy  GDMT on hold 2/2 JAMEL    Repeat TTE EF 20% with WMA  Likely Ischemic cardiomyopathy despite NST revealing fixed defectLHC confirming  Multivessel CAD        RHC: RA 20, PA 49/29 (40), PCWP 35, mVO2 51.1, CO/CI 3.8/2.2, SVR 1095    # CAD  LHC-RCA , severe ostial LM disease, diffuse disease in LAD and LCx, some L to R collaterals-seen by CTS advise cMR for viability poor target vessels for CABG-?High risk LM/LAD PCI  Had cMR-LVEF is 25%, greater than 75% subendocardial scarring involving the basal to mid inferior wall of the left ventricle, left ventricular transmural scarring involving the apical septal wall and likely near transmural scarring of the apical lateral wall. 50% scarring of the left ventricular basal inferoseptal wall.  Will need High risk PCI vs CABG though less likely 2/2 poor target vessel      Started on Milrinone to assist augmented diuresis in attempt to avoid further renal failure  Has lost Weight 170---> 164 Kg---> 161.1 Kg---> 165 Kg  Creatinine gradually rising  Diuretic holiday  For HD catheter placement to initiate Dialysis  02/21-Started HD      LE EDEMA no DVT      # JAMEL  Creatinine Trend:  4.76<--4.07<--, 3.49<--, 2.93<--2.95 <--2.81<--, 2.80<--, 2.74<--2.40<-- 2.37<--2.50<-- 2.98<--, 3.31<--, 2.65<--, 3.90<-- 1.17  Renal function worsening

## 2025-02-21 NOTE — CONSULT NOTE ADULT - PROBLEM SELECTOR RECOMMENDATION 4
-S/p LHC with multivessel disease  -CTS eval for potential CABG.
- healthcare surrogate: pt's brother Galo. Pt states she does not have a significant other nor children. She deferred decision making/conversations about her health to her brother Galo  - Code stauts FULL, counseling provided today.  - GOC: continue with all interventions without limitations

## 2025-02-21 NOTE — CONSULT NOTE ADULT - CONVERSATION DETAILS
WOUNDS As stated above, Discussion held with pt's brother Galo. Supportive listening provided as he described the decline he's witnessed in pt's illness course over the past 2 months. In conversation he is clear in stating that the goal for the patient is to pursue all interventions in order to make recovery. He also wishes for improved, timely communication on pt's plan of care in order for pt to best recover.    Counseling provided on pt's various comorbidities with advice that it's possible that despite our best efforts, she may continue to decline due to the severity of her vascular disease. Counseling provided on code status, though Galo is not ready to make any decisions at this time.

## 2025-02-21 NOTE — PROGRESS NOTE ADULT - SUBJECTIVE AND OBJECTIVE BOX
Follow-up Pulm Progress Note    alert, confused   No new respiratory events overnight.  Denies SOB/CP.     Medications:  MEDICATIONS  (STANDING):  ampicillin/sulbactam  IVPB 3 Gram(s) IV Intermittent every 12 hours  ampicillin/sulbactam  IVPB      aspirin enteric coated 81 milliGRAM(s) Oral daily  atorvastatin 40 milliGRAM(s) Oral at bedtime  benzocaine/menthol Lozenge 1 Lozenge Oral once  bisacodyl 5 milliGRAM(s) Oral every 12 hours  chlorhexidine 4% Liquid 1 Application(s) Topical <User Schedule>  dextrose 5%. 1000 milliLiter(s) (50 mL/Hr) IV Continuous <Continuous>  dextrose 5%. 1000 milliLiter(s) (100 mL/Hr) IV Continuous <Continuous>  dextrose 50% Injectable 25 Gram(s) IV Push once  dextrose 50% Injectable 12.5 Gram(s) IV Push once  dextrose 50% Injectable 25 Gram(s) IV Push once  glucagon  Injectable 1 milliGRAM(s) IntraMuscular once  heparin   Injectable 5000 Unit(s) SubCutaneous every 8 hours  insulin glargine Injectable (LANTUS) 9 Unit(s) SubCutaneous at bedtime  insulin lispro (ADMELOG) corrective regimen sliding scale   SubCutaneous three times a day before meals  insulin lispro (ADMELOG) corrective regimen sliding scale   SubCutaneous at bedtime  linagliptin 5 milliGRAM(s) Oral daily  milrinone Infusion 0.25 MICROgram(s)/kG/Min (5.41 mL/Hr) IV Continuous <Continuous>  mupirocin 2% Ointment 1 Application(s) Both Nostrils two times a day  pantoprazole  Injectable 40 milliGRAM(s) IV Push daily  polyethylene glycol 3350 17 Gram(s) Oral daily  senna 2 Tablet(s) Oral at bedtime  sodium chloride 0.9%. 250 milliLiter(s) (50 mL/Hr) IV Continuous <Continuous>    MEDICATIONS  (PRN):  acetaminophen     Tablet .. 650 milliGRAM(s) Oral every 6 hours PRN Mild Pain (1 - 3)  dextrose Oral Gel 15 Gram(s) Oral once PRN Blood Glucose LESS THAN 70 milliGRAM(s)/deciliter  melatonin 3 milliGRAM(s) Oral at bedtime PRN Insomnia  ondansetron Injectable 4 milliGRAM(s) IV Push every 6 hours PRN Nausea and/or Vomiting  oxyCODONE    IR 5 milliGRAM(s) Oral every 4 hours PRN Severe Pain (7 - 10)  sodium chloride 0.65% Nasal 1 Spray(s) Both Nostrils three times a day PRN Dryness  sodium chloride 0.9% lock flush 10 milliLiter(s) IV Push every 1 hour PRN Pre/post blood products, medications, blood draw, and to maintain line patency          Vital Signs Last 24 Hrs  T(C): 36.7 (21 Feb 2025 11:16), Max: 37.1 (20 Feb 2025 16:35)  T(F): 98.1 (21 Feb 2025 11:16), Max: 98.8 (20 Feb 2025 16:35)  HR: 102 (21 Feb 2025 11:16) (95 - 103)  BP: 108/62 (21 Feb 2025 11:16) (97/60 - 116/65)  BP(mean): --  RR: 18 (21 Feb 2025 11:16) (18 - 22)  SpO2: 94% (21 Feb 2025 11:16) (92% - 95%)    Parameters below as of 21 Feb 2025 11:16  Patient On (Oxygen Delivery Method): room air              02-20 @ 07:01  -  02-21 @ 07:00  --------------------------------------------------------  IN: 700 mL / OUT: 1200 mL / NET: -500 mL          LABS:                        9.5    13.29 )-----------( 329      ( 21 Feb 2025 06:55 )             27.7     02-21    137  |  95[L]  |  66[H]  ----------------------------<  96  3.6   |  23  |  3.38[H]    Ca    9.4      21 Feb 2025 06:55  Phos  5.7     02-20  Mg     2.2     02-20    TPro  8.5[H]  /  Alb  3.4  /  TBili  1.2  /  DBili  x   /  AST  72[H]  /  ALT  103[H]  /  AlkPhos  171[H]  02-21          CAPILLARY BLOOD GLUCOSE      POCT Blood Glucose.: 126 mg/dL (21 Feb 2025 11:44)    PT/INR - ( 20 Feb 2025 07:12 )   PT: 15.4 sec;   INR: 1.34 ratio         PTT - ( 20 Feb 2025 07:12 )  PTT:34.6 sec  Urinalysis Basic - ( 21 Feb 2025 06:55 )    Color: x / Appearance: x / SG: x / pH: x  Gluc: 96 mg/dL / Ketone: x  / Bili: x / Urobili: x   Blood: x / Protein: x / Nitrite: x   Leuk Esterase: x / RBC: x / WBC x   Sq Epi: x / Non Sq Epi: x / Bacteria: x                Physical Examination:  PULM: coarse at bases   CVS: S1, S2 heard    RADIOLOGY REVIEWED  CXR: congestion   bibasilar atelectasis/effusions     CT chest:< from: CT Chest No Cont (01.25.25 @ 14:08) >  ACC: [id number] EXAM:  CT CHEST   ORDERED BY: TED MONROY     PROCEDURE DATE:  01/25/2025        INTERPRETATION:  Clinical information: Shortness of breath.    CT scan of the chest was obtained without administration of intravenous   contrast.    Several lymph nodes are present in the mediastinum.    Heart is enlarged in size. Calcification of the coronary arteries is   noted. Small pericardial effusion is noted.    No endobronchial lesions are noted. Patchy groundglass opacities noted   within both lungs are felt to be secondary to expiratory   technique/breathing artifact. Atelectasis is noted involving portions of   both lower lobes. This is secondary to small bilateral pleural effusions.    Below the diaphragm, visualized portions of the abdomen demonstrate   patient to be status post cholecystectomy.    Degenerative changes of the spine are noted.    IMPRESSION: Small bilateral pleural effusions and small pericardial   effusion.    --- End of Report ---    < end of copied text >      TTE:  < from: TTE W or WO Ultrasound Enhancing Agent (02.10.25 @ 12:09) >    TRANSTHORACIC ECHOCARDIOGRAM REPORT  ________________________________________________________________________________                                      _______       Pt. Name:       TOMASZ ALBA Study Date:    2/10/2025  MRN:            [id number]      YOB: 1960  Accession #:    [id number]       Age:           64 years  Account#:       [id number]    Gender:        F  Heart Rate:                     Height:        63.00 in (160.02 cm)  Rhythm:                         Weight:       147.00 lb (66.68 kg)  Blood Pressure: 112/56 mmHg     BSA/BMI:       1.70 m² / 26.04 kg/m²  ________________________________________________________________________________________  Referring Physician:       [id number] Mario Lu  Interpreting Physician:    Reg Glaser MD  Primary Sonographer:       Kassie Bergeron [facility]  Fellow (Performing):       Abbe Pickard MD  Fellow (Interpreting):     Abbe Pickard MD  Fellow (2nd Interpreting): Josefina Yo MD    CPT:                ECHO TTE W CON FU LTD - .m;DEFINITY ECHO CONTRAST PER                      ML WASTED - .m;DEFINITY ECHO CONTRAST PER ML - .m  Indication(s):      Abnormal electrocardiogram ECG EKG - R94.31  Procedure:          Limited transthoracic echocardiogram.  Ordering Location:  [facility]  Admission Status:   Inpatient  Contrast Injection: Verbal consent was obtained for injection of Ultrasonic                      Enhancing Agent following a discussion of risks and                      benefits.   Endocardial visualization enhanced with 2 ml of Definity                      Ultrasound enhancing agent (Lot#:[id number] Exp.Date:08/2025                      Discarded Dose:8ml).  UEA Reaction:       Patient had no adverse reaction after injection of                    Ultrasound Enhancing Agent.  Study Information:  Image quality for this study is fair.       CONCLUSIONS:      1. Left ventricular cavity is mildly dilated. Left ventricular systolic function is severely decreased with an ejection fraction of 20 % by Winchester's method of disks. Regional wall motion abnormalities present.   2. Multiple segmental abnormalities exist. See findings.   3. Reduced right ventricular systolic function.   4. No pericardial effusion seen.   5. Compared to the transthoracic echocardiogram performed on 1/16/2025, Worsening of the left ventricular function.    ________________________________________________________________________________________  FINDINGS:     Left Ventricle:  The left ventricular cavity is mildly dilated. Left ventricular systolic function is severely decreased with a calculated ejection fraction of 20 % by the Winchester's biplane method of disks. There are regional wall motion abnormalities present.  LV Wall Scoring: The entire septum, entire apex, entire inferior wall, mid  inferolateral segment, and mid anterolateral segment are hypokinetic. All  remaining scored segments are normal.          Right Ventricle:  The right ventricular cavity is normal in size and right ventricular systolic function is reduced. Tricuspid annular plane systolic excursion (TAPSE) is 1.2 cm (normal >=1.7 cm).     Pericardium:  No pericardial effusion seen.  ____________________________________________________________________  QUANTITATIVE DATA:  Left Ventricle Measurements: (Indexed to BSA)     BiPlane LV EF%: 20 %             Right Ventricle Measurements:     TAPSE: 1.2 cm       LVOT / RVOT/ Qp/Qs Data: (Indexed to BSA)  LVOT Vmax:      0.70 m/s  LVOT Vmn:       0.455 m/s  LVOT VTI:       11.00 cm  LVOT peak grad: 2 mmHg  LVOT mean grad: 1.0 mmHg    < end of copied text >

## 2025-02-21 NOTE — PROVIDER CONTACT NOTE (OTHER) - ACTION/TREATMENT ORDERED:
Provider made aware. Tube feeding stopped, Patients mouth was suctioned. Provider ordered deep suction for RT to do. Provider ordered STAT albuterol nebulizer treatment.

## 2025-02-21 NOTE — PROGRESS NOTE ADULT - ASSESSMENT
63 y/o F with PMH of CHF (last TTE 1/2025 with EF 30-35%), DM, diabetic foot ulcer, PAD, CAD. Recent admission at [facility] for CHF exacerbation, presented to [facility] as a transfer for worsening R leg pain. Hospital course c/b acute on chronic CHF - TTE with EF 20%, severely decreased LV and RV function, multiple regional motion abnormalities, s/p LHC revealing TVD, pleural/pericardial effusions, JAMEL, leukocytosis suspected to be in the setting of LE wound on IV ABX, persistent RLE pain w/ RLE Arterial Duplex revealing popliteal artery occlusion  Plan for eventual angiogram with vascular when medically optimized. Pulmonary called to consult as pt with c/o SOB.

## 2025-02-21 NOTE — CONSULT NOTE ADULT - PROBLEM SELECTOR RECOMMENDATION 2
- Bilateral lower extremity venous stasis wounds to dermis, mild serous drainage, no acute signs of infection. Left foot hallux distal tuft well adhered eschar, no acute signs of infection.   - Bilateral foot xray: no OM, no gas   - Pending CO2 angiogram ; trending Scr   - c/w Unasyn to Q12
- Vascular following, plan for angiogram in the coming days to eval disease and tx options  - Pt with sedation after oxycodone use. Suggest non-opiate interventions such as tylenol 500 mg q8 hours ATC, consider rotating oxycodone 5 mg to PO dilaudid solution 1 mg q6 hours prn severe uncontrolled pain, d/w primary team
-TTE with severely decreased LV systolic function w/ LVEF 20%, regional WMA and multiple segmental abnormalities, reduced RV systolic function. LV function worsening since prior echo  -S/p cath with multivessel disease  -On Bumex drip, milrinone drip   -Management per HF team.

## 2025-02-21 NOTE — CONSULT NOTE ADULT - PROBLEM SELECTOR PROBLEM 1
CAD (coronary artery disease)
Type 2 diabetes mellitus with hyperglycemia
Congestive heart failure (CHF)
Shortness of breath

## 2025-02-21 NOTE — PROGRESS NOTE ADULT - SUBJECTIVE AND OBJECTIVE BOX
Name of Patient : TOMASZ ALBA  MRN: [id number]  Date of visit: 02-21-25      Subjective: Patient seen and examined. No new events except as noted.   confused   brother at the bedside     REVIEW OF SYSTEMS:  LE pain       MEDICATIONS:  MEDICATIONS  (STANDING):  ampicillin/sulbactam  IVPB 3 Gram(s) IV Intermittent every 12 hours  ampicillin/sulbactam  IVPB      aspirin enteric coated 81 milliGRAM(s) Oral daily  atorvastatin 40 milliGRAM(s) Oral at bedtime  benzocaine/menthol Lozenge 1 Lozenge Oral once  bisacodyl 5 milliGRAM(s) Oral every 12 hours  chlorhexidine 4% Liquid 1 Application(s) Topical <User Schedule>  dextrose 5%. 1000 milliLiter(s) (50 mL/Hr) IV Continuous <Continuous>  dextrose 5%. 1000 milliLiter(s) (100 mL/Hr) IV Continuous <Continuous>  dextrose 50% Injectable 25 Gram(s) IV Push once  dextrose 50% Injectable 12.5 Gram(s) IV Push once  dextrose 50% Injectable 25 Gram(s) IV Push once  glucagon  Injectable 1 milliGRAM(s) IntraMuscular once  heparin   Injectable 5000 Unit(s) SubCutaneous every 8 hours  insulin glargine Injectable (LANTUS) 9 Unit(s) SubCutaneous at bedtime  insulin lispro (ADMELOG) corrective regimen sliding scale   SubCutaneous three times a day before meals  insulin lispro (ADMELOG) corrective regimen sliding scale   SubCutaneous at bedtime  linagliptin 5 milliGRAM(s) Oral daily  milrinone Infusion 0.25 MICROgram(s)/kG/Min (5.41 mL/Hr) IV Continuous <Continuous>  mupirocin 2% Ointment 1 Application(s) Both Nostrils two times a day  pantoprazole  Injectable 40 milliGRAM(s) IV Push daily  polyethylene glycol 3350 17 Gram(s) Oral daily  senna 2 Tablet(s) Oral at bedtime  sodium chloride 0.9%. 250 milliLiter(s) (50 mL/Hr) IV Continuous <Continuous>      PHYSICAL EXAM:  T(C): 36.7 (02-21-25 @ 11:16), Max: 37.1 (02-20-25 @ 16:35)  HR: 102 (02-21-25 @ 11:16) (100 - 103)  BP: 108/62 (02-21-25 @ 11:16) (97/60 - 116/65)  RR: 18 (02-21-25 @ 11:16) (18 - 18)  SpO2: 94% (02-21-25 @ 11:16) (93% - 95%)  Wt(kg): --  I&O's Summary    20 Feb 2025 07:01  -  21 Feb 2025 07:00  --------------------------------------------------------  IN: 700 mL / OUT: 1200 mL / NET: -500 mL    21 Feb 2025 07:01  -  21 Feb 2025 16:26  --------------------------------------------------------  IN: 350 mL / OUT: 700 mL / NET: -350 mL          Appearance: awake, confused   HEENT:  PERRLA   Lymphatic: No lymphadenopathy   Cardiovascular: Normal S1 S2, no JVD  Respiratory: normal effort , clear  Gastrointestinal:  Soft, Non-tender  Skin: No rashes,  warm to touch  Psychiatry:  Mood & affect appropriate  Musculuskeletal: LE dressing     recent labs, Imaging and EKGs personally reviewed     02-20-25 @ 07:01  -  02-21-25 @ 07:00  --------------------------------------------------------  IN: 700 mL / OUT: 1200 mL / NET: -500 mL    02-21-25 @ 07:01  -  02-21-25 @ 16:26  --------------------------------------------------------  IN: 350 mL / OUT: 700 mL / NET: -350 mL                            9.5    13.29 )-----------( 329      ( 21 Feb 2025 06:55 )             27.7               02-21    137  |  95[L]  |  66[H]  ----------------------------<  96  3.6   |  23  |  3.38[H]    Ca    9.4      21 Feb 2025 06:55  Phos  5.7     02-20  Mg     2.2     02-20    TPro  8.5[H]  /  Alb  3.4  /  TBili  1.2  /  DBili  x   /  AST  72[H]  /  ALT  103[H]  /  AlkPhos  171[H]  02-21    PT/INR - ( 20 Feb 2025 07:12 )   PT: 15.4 sec;   INR: 1.34 ratio         PTT - ( 20 Feb 2025 07:12 )  PTT:34.6 sec                   Urinalysis Basic - ( 21 Feb 2025 06:55 )    Color: x / Appearance: x / SG: x / pH: x  Gluc: 96 mg/dL / Ketone: x  / Bili: x / Urobili: x   Blood: x / Protein: x / Nitrite: x   Leuk Esterase: x / RBC: x / WBC x   Sq Epi: x / Non Sq Epi: x / Bacteria: x

## 2025-02-22 LAB
ANION GAP SERPL CALC-SCNC: 19 MMOL/L — HIGH (ref 5–17)
ANISOCYTOSIS BLD QL: SIGNIFICANT CHANGE UP
BASOPHILS # BLD AUTO: 0 K/UL — SIGNIFICANT CHANGE UP (ref 0–0.2)
BASOPHILS NFR BLD AUTO: 0 % — SIGNIFICANT CHANGE UP (ref 0–2)
BUN SERPL-MCNC: 66 MG/DL — HIGH (ref 7–23)
BURR CELLS BLD QL SMEAR: PRESENT — SIGNIFICANT CHANGE UP
CALCIUM SERPL-MCNC: 9.1 MG/DL — SIGNIFICANT CHANGE UP (ref 8.4–10.5)
CHLORIDE SERPL-SCNC: 91 MMOL/L — LOW (ref 96–108)
CO2 SERPL-SCNC: 25 MMOL/L — SIGNIFICANT CHANGE UP (ref 22–31)
CREAT SERPL-MCNC: 3.02 MG/DL — HIGH (ref 0.5–1.3)
DACRYOCYTES BLD QL SMEAR: SLIGHT — SIGNIFICANT CHANGE UP
EGFR: 17 ML/MIN/1.73M2 — LOW
EGFR: 17 ML/MIN/1.73M2 — LOW
EOSINOPHIL # BLD AUTO: 0.46 K/UL — SIGNIFICANT CHANGE UP (ref 0–0.5)
EOSINOPHIL NFR BLD AUTO: 3.5 % — SIGNIFICANT CHANGE UP (ref 0–6)
GLUCOSE BLDC GLUCOMTR-MCNC: 180 MG/DL — HIGH (ref 70–99)
GLUCOSE BLDC GLUCOMTR-MCNC: 193 MG/DL — HIGH (ref 70–99)
GLUCOSE BLDC GLUCOMTR-MCNC: 232 MG/DL — HIGH (ref 70–99)
GLUCOSE BLDC GLUCOMTR-MCNC: 238 MG/DL — HIGH (ref 70–99)
GLUCOSE SERPL-MCNC: 165 MG/DL — HIGH (ref 70–99)
HCT VFR BLD CALC: 26.8 % — LOW (ref 34.5–45)
HGB BLD-MCNC: 9.4 G/DL — LOW (ref 11.5–15.5)
LYMPHOCYTES # BLD AUTO: 1.03 K/UL — SIGNIFICANT CHANGE UP (ref 1–3.3)
LYMPHOCYTES # BLD AUTO: 7.9 % — LOW (ref 13–44)
MACROCYTES BLD QL: SLIGHT — SIGNIFICANT CHANGE UP
MANUAL SMEAR VERIFICATION: SIGNIFICANT CHANGE UP
MCHC RBC-ENTMCNC: 25.1 PG — LOW (ref 27–34)
MCHC RBC-ENTMCNC: 35.1 G/DL — SIGNIFICANT CHANGE UP (ref 32–36)
MCV RBC AUTO: 71.7 FL — LOW (ref 80–100)
MONOCYTES # BLD AUTO: 0.34 K/UL — SIGNIFICANT CHANGE UP (ref 0–0.9)
MONOCYTES NFR BLD AUTO: 2.6 % — SIGNIFICANT CHANGE UP (ref 2–14)
NEUTROPHILS # BLD AUTO: 11.18 K/UL — HIGH (ref 1.8–7.4)
NEUTROPHILS NFR BLD AUTO: 86 % — HIGH (ref 43–77)
PLAT MORPH BLD: NORMAL — SIGNIFICANT CHANGE UP
PLATELET # BLD AUTO: 295 K/UL — SIGNIFICANT CHANGE UP (ref 150–400)
POIKILOCYTOSIS BLD QL AUTO: SLIGHT — SIGNIFICANT CHANGE UP
POLYCHROMASIA BLD QL SMEAR: SLIGHT — SIGNIFICANT CHANGE UP
POTASSIUM SERPL-MCNC: 3.3 MMOL/L — LOW (ref 3.5–5.3)
POTASSIUM SERPL-SCNC: 3.3 MMOL/L — LOW (ref 3.5–5.3)
RBC # BLD: 3.74 M/UL — LOW (ref 3.8–5.2)
RBC # FLD: 20.8 % — HIGH (ref 10.3–14.5)
RBC BLD AUTO: ABNORMAL
SCHISTOCYTES BLD QL AUTO: SLIGHT — SIGNIFICANT CHANGE UP
SODIUM SERPL-SCNC: 135 MMOL/L — SIGNIFICANT CHANGE UP (ref 135–145)
TARGETS BLD QL SMEAR: SIGNIFICANT CHANGE UP
WBC # BLD: 13 K/UL — HIGH (ref 3.8–10.5)
WBC # FLD AUTO: 13 K/UL — HIGH (ref 3.8–10.5)

## 2025-02-22 RX ORDER — BUMETANIDE 1 MG/1
4 TABLET ORAL EVERY 8 HOURS
Refills: 0 | Status: COMPLETED | OUTPATIENT
Start: 2025-02-22 | End: 2025-02-22

## 2025-02-22 RX ORDER — INSULIN LISPRO 100 U/ML
2 INJECTION, SOLUTION INTRAVENOUS; SUBCUTANEOUS
Refills: 0 | Status: DISCONTINUED | OUTPATIENT
Start: 2025-02-23 | End: 2025-02-25

## 2025-02-22 RX ORDER — INSULIN LISPRO 100 U/ML
2 INJECTION, SOLUTION INTRAVENOUS; SUBCUTANEOUS
Refills: 0 | Status: DISCONTINUED | OUTPATIENT
Start: 2025-02-22 | End: 2025-02-22

## 2025-02-22 RX ADMIN — AMPICILLIN SODIUM AND SULBACTAM SODIUM 200 GRAM(S): 1; .5 INJECTION, POWDER, FOR SOLUTION INTRAMUSCULAR; INTRAVENOUS at 22:15

## 2025-02-22 RX ADMIN — INSULIN LISPRO 1: 100 INJECTION, SOLUTION INTRAVENOUS; SUBCUTANEOUS at 08:11

## 2025-02-22 RX ADMIN — MILRINONE LACTATE 5.41 MICROGRAM(S)/KG/MIN: 1 INJECTION, SOLUTION INTRAVENOUS at 22:16

## 2025-02-22 RX ADMIN — LINAGLIPTIN 5 MILLIGRAM(S): 5 TABLET, FILM COATED ORAL at 12:12

## 2025-02-22 RX ADMIN — MUPIROCIN CALCIUM 1 APPLICATION(S): 20 CREAM TOPICAL at 17:12

## 2025-02-22 RX ADMIN — POLYETHYLENE GLYCOL 3350 17 GRAM(S): 17 POWDER, FOR SOLUTION ORAL at 12:13

## 2025-02-22 RX ADMIN — Medication 1 MILLIGRAM(S): at 20:41

## 2025-02-22 RX ADMIN — INSULIN LISPRO 2: 100 INJECTION, SOLUTION INTRAVENOUS; SUBCUTANEOUS at 12:11

## 2025-02-22 RX ADMIN — ATORVASTATIN CALCIUM 40 MILLIGRAM(S): 80 TABLET, FILM COATED ORAL at 22:18

## 2025-02-22 RX ADMIN — AMPICILLIN SODIUM AND SULBACTAM SODIUM 200 GRAM(S): 1; .5 INJECTION, POWDER, FOR SOLUTION INTRAMUSCULAR; INTRAVENOUS at 12:09

## 2025-02-22 RX ADMIN — BUMETANIDE 132 MILLIGRAM(S): 1 TABLET ORAL at 17:12

## 2025-02-22 RX ADMIN — HEPARIN SODIUM 5000 UNIT(S): 1000 INJECTION INTRAVENOUS; SUBCUTANEOUS at 14:30

## 2025-02-22 RX ADMIN — Medication 2 TABLET(S): at 22:18

## 2025-02-22 RX ADMIN — Medication 1 APPLICATION(S): at 06:00

## 2025-02-22 RX ADMIN — HEPARIN SODIUM 5000 UNIT(S): 1000 INJECTION INTRAVENOUS; SUBCUTANEOUS at 22:18

## 2025-02-22 RX ADMIN — MILRINONE LACTATE 5.41 MICROGRAM(S)/KG/MIN: 1 INJECTION, SOLUTION INTRAVENOUS at 08:14

## 2025-02-22 RX ADMIN — Medication 5 MILLIGRAM(S): at 17:12

## 2025-02-22 RX ADMIN — Medication 1 MILLIGRAM(S): at 22:05

## 2025-02-22 RX ADMIN — INSULIN GLARGINE-YFGN 9 UNIT(S): 100 INJECTION, SOLUTION SUBCUTANEOUS at 22:55

## 2025-02-22 RX ADMIN — Medication 40 MILLIGRAM(S): at 12:12

## 2025-02-22 RX ADMIN — HEPARIN SODIUM 5000 UNIT(S): 1000 INJECTION INTRAVENOUS; SUBCUTANEOUS at 05:47

## 2025-02-22 RX ADMIN — Medication 650 MILLIGRAM(S): at 01:40

## 2025-02-22 RX ADMIN — INSULIN LISPRO 2: 100 INJECTION, SOLUTION INTRAVENOUS; SUBCUTANEOUS at 17:08

## 2025-02-22 RX ADMIN — MUPIROCIN CALCIUM 1 APPLICATION(S): 20 CREAM TOPICAL at 05:48

## 2025-02-22 RX ADMIN — Medication 81 MILLIGRAM(S): at 12:12

## 2025-02-22 RX ADMIN — BUMETANIDE 132 MILLIGRAM(S): 1 TABLET ORAL at 22:16

## 2025-02-22 RX ADMIN — MILRINONE LACTATE 5.41 MICROGRAM(S)/KG/MIN: 1 INJECTION, SOLUTION INTRAVENOUS at 01:31

## 2025-02-22 NOTE — PROGRESS NOTE ADULT - ASSESSMENT
64y Female with history of CHF presents as a transfer for LE CO2 angiogram. Nephrology consulted for elevated Scr.    1) JAMEL: likely due to ATN in addition to CRS. Scr continuing to increase, but unclear if this is due to an JAMEL or due to a decrease in volume overload. Monitor CMP daily. UA relatively bland. FeUrea low consistent with low flow state. Renal US unremarkable. Bladder scan on 2/3 negative. Avoid nephrotoxins. Need accurate measurements of intake and urinary output. s/p Cardiac MRI on 2/19. Per discussion with Internal Medicine and Cardiology, patient will benefit from improved kidney function before LLE angiogram can be performed. Obtained consent for HD from patient and her brothers. s/p HD on 2/20. HD was performed due to worsening renal fxn and inadequate volume management with maximum diuretic therapy.  Need to volume optimized prior to next angiogram.  Renal fxn improved yesterday to today, and volume status is somewhat better following HD X1.  Likely cardiac function improved somewhat following unloading of volume with HD allowing for improved renal perfusion.  Plan for trial of diuretics today and tomorrow.  Likely will need dialysis again prior to next angiogram on Monday to optimize volume status prior to procedure.  Pt may need HD afterward as well.  It is unclear if she will require dialysis long-term but there is concern that it will be required to maintain adequate volume status.  2) HTN: BP low normal. Monitor off anti-hypertensive medications.    3) LE edema: In the setting of CHF/volume overload with renal failure.  Not secondary to nephrotic syndrome given subnephrotic range proteinuria. Continue milrinone gtt as per Cardiology per Cardiology. Given improving renal fxn today, will give trial of diuretics as above.  TTE with severely decreased LVSF. Monitor UO. Recommend checking CMP twice daily while on high doses of Bumex.    4) Hyperphosphatemia: Resolved. Continue with low phosphorus diet.     5. Hypernatremia: Due to receiving 3% hypertonic saline, has since resolved after holding on 2/17. Cardiology was providing this to augment urinary output.  Na+ decreased and is now borderline low due to volume overload.  HD would help.  Diuretic dosing may help as well. Monitor daily.    [facility] NEPHROLOGY  Raji Waterman M.D.  Mars Feliz D.O.  Kelley Parks M.D.  MD Kayla Kulkarnia Lucho, MSN, ANP-C    Telephone: [phone]  Facsimile: [phone]    [facility]-[address], [address]  [place] [id number]

## 2025-02-22 NOTE — PROGRESS NOTE ADULT - SUBJECTIVE AND OBJECTIVE BOX
Name of Patient : TOMASZ ALBA  MRN: [id number]      Subjective: Patient seen and examined. No new events except as noted.     REVIEW OF SYSTEMS:    CONSTITUTIONAL: No weakness, fevers or chills  EYES/ENT: No visual changes;  No vertigo or throat pain   NECK: No pain or stiffness  RESPIRATORY: No cough, wheezing, hemoptysis; No shortness of breath  CARDIOVASCULAR: No chest pain or palpitations  GASTROINTESTINAL: No abdominal or epigastric pain. No nausea, vomiting, or hematemesis; No diarrhea or constipation. No melena or hematochezia.  GENITOURINARY: No dysuria, frequency or hematuria  NEUROLOGICAL: No numbness or weakness  SKIN: No itching, burning, rashes, or lesions   All other review of systems is negative unless indicated above.    MEDICATIONS:  MEDICATIONS  (STANDING):  ampicillin/sulbactam  IVPB 3 Gram(s) IV Intermittent every 12 hours  ampicillin/sulbactam  IVPB      aspirin enteric coated 81 milliGRAM(s) Oral daily  atorvastatin 40 milliGRAM(s) Oral at bedtime  benzocaine/menthol Lozenge 1 Lozenge Oral once  bisacodyl 5 milliGRAM(s) Oral every 12 hours  chlorhexidine 4% Liquid 1 Application(s) Topical <User Schedule>  dextrose 5%. 1000 milliLiter(s) (50 mL/Hr) IV Continuous <Continuous>  dextrose 5%. 1000 milliLiter(s) (100 mL/Hr) IV Continuous <Continuous>  dextrose 50% Injectable 25 Gram(s) IV Push once  dextrose 50% Injectable 12.5 Gram(s) IV Push once  dextrose 50% Injectable 25 Gram(s) IV Push once  glucagon  Injectable 1 milliGRAM(s) IntraMuscular once  heparin   Injectable 5000 Unit(s) SubCutaneous every 8 hours  insulin glargine Injectable (LANTUS) 9 Unit(s) SubCutaneous at bedtime  insulin lispro (ADMELOG) corrective regimen sliding scale   SubCutaneous three times a day before meals  insulin lispro (ADMELOG) corrective regimen sliding scale   SubCutaneous at bedtime  insulin lispro Injectable (ADMELOG) 2 Unit(s) SubCutaneous three times a day with meals  linagliptin 5 milliGRAM(s) Oral daily  milrinone Infusion 0.25 MICROgram(s)/kG/Min (5.41 mL/Hr) IV Continuous <Continuous>  mupirocin 2% Ointment 1 Application(s) Both Nostrils two times a day  pantoprazole  Injectable 40 milliGRAM(s) IV Push daily  polyethylene glycol 3350 17 Gram(s) Oral daily  senna 2 Tablet(s) Oral at bedtime      PHYSICAL EXAM:  T(C): 36.8 (02-22-25 @ 21:33), Max: 37.1 (02-22-25 @ 16:16)  HR: 102 (02-22-25 @ 21:33) (102 - 103)  BP: 110/58 (02-22-25 @ 21:33) (106/59 - 120/68)  RR: 18 (02-22-25 @ 21:33) (16 - 18)  SpO2: 98% (02-22-25 @ 21:33) (93% - 98%)  Wt(kg): --  I&O's Summary    21 Feb 2025 07:01  -  22 Feb 2025 07:00  --------------------------------------------------------  IN: 590 mL / OUT: 2350 mL / NET: -1760 mL    22 Feb 2025 07:01  -  23 Feb 2025 00:33  --------------------------------------------------------  IN: 720 mL / OUT: 2330 mL / NET: -1610 mL          Appearance: Normal	  HEENT:  PERRLA   Lymphatic: No lymphadenopathy   Cardiovascular: Normal S1 S2, no JVD  Respiratory: normal effort , clear  Gastrointestinal:  Soft, Non-tender  Skin: No rashes,  warm to touch  Psychiatry:  Mood & affect appropriate  Musculuskeletal: No edema    recent labs, Imaging and EKGs personally reviewed     02-21-25 @ 07:01  -  02-22-25 @ 07:00  --------------------------------------------------------  IN: 590 mL / OUT: 2350 mL / NET: -1760 mL    02-22-25 @ 07:01  -  02-23-25 @ 00:33  --------------------------------------------------------  IN: 720 mL / OUT: 2330 mL / NET: -1610 mL                          9.4    13.00 )-----------( 295      ( 22 Feb 2025 07:16 )             26.8               02-22    135  |  91[L]  |  66[H]  ----------------------------<  165[H]  3.3[L]   |  25  |  3.02[H]    Ca    9.1      22 Feb 2025 07:16    TPro  8.5[H]  /  Alb  3.4  /  TBili  1.2  /  DBili  x   /  AST  72[H]  /  ALT  103[H]  /  AlkPhos  171[H]  02-21                       Urinalysis Basic - ( 22 Feb 2025 07:16 )    Color: x / Appearance: x / SG: x / pH: x  Gluc: 165 mg/dL / Ketone: x  / Bili: x / Urobili: x   Blood: x / Protein: x / Nitrite: x   Leuk Esterase: x / RBC: x / WBC x   Sq Epi: x / Non Sq Epi: x / Bacteria: x               Intermediate Repair And Flap Additional Text (Will Appearing After The Standard Complex Repair Text): The intermediate repair was not sufficient to completely close the primary defect. The remaining additional defect was repaired with the flap mentioned below.

## 2025-02-22 NOTE — PROGRESS NOTE ADULT - ASSESSMENT
63 YO F with PMHx of HFrEF 30-35 and grade 2 ddfxn (last TTE on 01/16/25), DM2, and diabetic foot ulcer. Recent admission at [facility] for ADHF. Patient now represents to OSH and ultimately to [facility] as a transfer for worsening right leg pain and fluid secretion. As per documentation, patient was reported to have severe depletion of RLE blood flow beyond the popliteal vessel at knee and similar findings in the left. Patient was transferred to [facility] for CO2 angiogram with Dr. Baltazar. Of note, patient also with reported visual disturbance for which patient was seen and evaluated by opthalmology. Internal Medicine has been consulted on Ms. Kirby's care for medical management.     # ADHF/ BiV HFrEF 20   - Recent admission at [facility] for ADHF and on dobutamine outpatient  - TTE 1/16 with EF 30-35 with moderately reduced LVSF and grade 2 ddfxn, normal RVSF , trace TR/ WI, and trace pericardial effusion  - NST abnormal with small-sized, moderate defect in apical wall that is predominantly fixed suggestive of an infarction with minimal marcus-infarct ischemia. Post stress LVEF 25.  - CT Chest with small BL pleural effusions and small pericardial effusion.  - RPT TTE with EF 20, severely decreased LV, decreased RVSF with TAPSE 1.2, and regional wall motion abnormalities present with entire septum, entire apex, entire inferior wall, mid inferolateral segment, and mid anterolateral segment are hypokinetic.   - RHC with RA 20, PA 49/29 (40), PCWP 35, mVO2 51.1, CO/CI 3.8/2.2, SVR 1095 w/ Multivessel CAD   - s/p zaroxyln 10 QD  - Continue on milrinone 0.125mcg GTT (decreased 2/18) with aldactone for now  - CRE and sodium rising and bumex GTT stopped 2/18 and HTS stopped 2/16   - RPT limited TTE w/ LVSF  severely decreased, Reduced RVSF, LVOT VTI 12.0cm, Mild to mod TR, mildly elevated right atrial pressure  - HF following and adjusting medications   - Case discussed with EP and needs to be on GDMT for 3 months before AICD placement and should be stabilized off of inotropic support.   - Monitor I and O, diuresis per Cardio/ Renal    - GDMT as per Cardio  - Serial CXR   - Monitor volume status   - Check daily weights    - Monitor on telemetry; Monitor electrolytes   - Cardio, HF, Renal, and EP evals appreciated; F/u recs     # CAD with TVD   - [facility] with RCA , severe ostial LM disease, diffuse disease in LAD and LCx, some L to R collaterals (Multivessel CAD)  - Case discussed with CTSx and NOT a candidate for CABG due to comorbidities  - Cardiac MRI done F/u Cardio   - AS and Statin   - Cardiology following     # BL LE Edema likely in setting of ADHF  - Diuresis as per Cardio, HF and Renal   - BL LE elevation and compression  - LE Duplex neg   - Monitor for now  - Podiatry and Vascular evals appreciated; F/u recs    # Pericardial effusion likely in setting ADHF  - TTE with trace pericardial effusion   - CT Chest with small pericardial effusion   - Denies chest pain, palpitations, chest tightness or discomfort, shortness of breath or dyspnea   - Repeat TTE in 1 month for further evaluation   - Diuresis per cardio/ renal.  - Monitor on telemetry  - Cardio following    # Pleural effusion likely in setting ADHF  - CT Chest with small BL pleural effusions  - On RA with no respiratory complaints at present  - Monitor O2 saturation  - Supplement to maintain > 90%   - Diuresis per cardio/ renal.     # JAMEL with Hyponatremia and Metabolic Acidosis   - Baseline CRE 0.7-1.2 and increased to ~4  - As per OSH documentation, JAMEL was thought to be second to Unasyn/ Bumex / Entresto use and Hypotension   - US RENAL with no hydronephrosis  - CRE improved slightly with diuresis/ inotropic support and likely cardiorenal induced with low flow state in ADHF, however now rising again and concern for milrinone vs overdiuresis (prerenal) vs cardiorenal.   - Milrinone adjusted as above and diuresis remains off per HF .   - Remains nonoliguric, S/P Shiley 2/20 w/ IR and initiation of HD .  - HF following and adjusting medications.    - Continue with HF support as above  - Avoid nephrotoxic agents  - Monitor Cr and daily BMP   - Renal eval appreciated    # Transaminitis  - Likely 2/2 hepatic congestion   - Volume removal with HD as tolerated  - Trend LFTs, if uptrend post-HD initiation, check ABD US  - Serial ABD Examinations    # Retention   - Pt with + bladder scan   - Check additional scan  - Place stiles as per protocol if +     # Hypernatremia   - Hypernatremia likely from HTS vs over diuresis/ intravascular dehydraiton   - Hold further HTS   - Sodium improved   - Monitor sodium     # Leukocytosis likely in setting of BL LE ulcers with pop occlusion   - BCx negative   - UCx with probable contamination   - On unasyn for ABX. Frequency increased to Q12 as per Renal   - Leukocytosis fluctuating, however appears nontoxic with no fever spikes and monitoring closely on below unasyn.   - If febrile check pan cultures   - Trend CBC, temp curve, VS and adjust as tolerated    # Foot ulcers with worsening RLE pain second to pop artery occlusion +/- diabetic ulcers   - RLE Arterial Duplex with popliteal artery occlusion   - LLE Arterial Duplex with underlying disease cannot be excluded   - Patient transferred to [facility] for CO2 angiogram, however given ADHF currently not medically optimized and high risk. Plan to continue on diuresis and inotropic support as above   - Continue on Lipitor  - Was on Heparin GTT for now and now on HSQ  - Continue pain control with oxy  - Wound care called for dressing recs   - Vascular following , plan for angio week monday     # Tachycardia likely pain related   - On Toprol and improved  - Continue to monitor on tele   - Cardio eval appreciated    # Visual Disturbance  - CT Head w/ old infarcts  - On ASA and Statin   - Opthalmology eval appreciated    # Indigestion and Constipation   - PPI, miralax and senna continued  - Monitor BMs    # Anemia likely mixed AOCD vs iron deficiency   - HH stable in 9s on HSQ  - Consider iron tabs when optimized   - Monitor HH   - Transfuse for Hgb < 8  - Maintain active T/S    # Diabetes Mellitus A1C 11.6   - Continue on lantus 10 with tradjecta 5 and ISS   - Diabetic DASH diet   - Monitor and adjust glucose levels PRN   - Endocrine following; F/u recs     # HLD and HLD  - Continue on lipitor and toprol  - Monitor BP    # DISPO TBD            discussed in detail with patient brother over th ephone. pain meds adjusted   all questions answered

## 2025-02-22 NOTE — PROGRESS NOTE ADULT - SUBJECTIVE AND OBJECTIVE BOX
[facility] NEPHROLOGY- PROGRESS NOTE    64y Female with history of CHF presents as a transfer for LE CO2 angiogram. Nephrology consulted for elevated Scr.    Pain in lower extremities has decreased.     Pt feels much better and says SOB improved significantly after dialysis.    REVIEW OF SYSTEMS:  Gen: no fevers  Cards: no chest pain  Resp: + dyspnea with exertion  GI: no nausea and vomiting, no diarrhea  Vascular: Stable LE edema.  Msk: Persistent pain upon moving legs.    No Known Allergies      Hospital Medications: Medications reviewed    Vital Signs Last 24 Hrs  T(C): 36.4 (22 Feb 2025 12:19), Max: 36.9 (22 Feb 2025 05:16)  T(F): 97.5 (22 Feb 2025 12:19), Max: 98.4 (22 Feb 2025 05:16)  HR: 103 (22 Feb 2025 12:19) (103 - 106)  BP: 120/68 (22 Feb 2025 12:19) (102/62 - 120/68)  BP(mean): 75 (21 Feb 2025 21:31) (75 - 75)  RR: 18 (22 Feb 2025 12:19) (18 - 18)  SpO2: 98% (22 Feb 2025 12:19) (95% - 98%)    Parameters below as of 22 Feb 2025 12:19  Patient On (Oxygen Delivery Method): room air        PHYSICAL EXAM:    Gen: NAD, calm  Cards: RRR, +S1/S2, no M/G/R  Resp: bibasilar rales, mild  GI: soft, NT/ND, NABS  Vascular: 1+ RLE edema > LLE edema with legs wrapped but seems improved from prior examinations.    LABS:  02-22  135 <--, 137 <--, 137 <--, 138 <--, 141 <--, 150 <--, 142 <--  135  |  91[L]  |  66[H]  ----------------------------<  165[H]  3.3[L]   |  25  |  3.02[H]    Ca    9.1      22 Feb 2025 07:16    TPro  8.5[H]  /  Alb  3.4  /  TBili  1.2  /  DBili      /  AST  72[H]  /  ALT  103[H]  /  AlkPhos  171[H]  02-21    Creatinine Trend: 3.02 <--, 3.38 <--, 4.76 <--, 4.07 <--, 3.49 <--, 2.93 <--, 2.95 <--, 2.81 <--, 2.80 <--                        9.4    13.00 )-----------( 295      ( 22 Feb 2025 07:16 )             26.8     Urine Studies:  Urinalysis Basic - ( 22 Feb 2025 07:16 )    Color:  / Appearance:  / SG:  / pH:   Gluc: 165 mg/dL / Ketone:   / Bili:  / Urobili:    Blood:  / Protein:  / Nitrite:    Leuk Esterase:  / RBC:  / WBC    Sq Epi:  / Non Sq Epi:  / Bacteria:

## 2025-02-22 NOTE — CHART NOTE - NSCHARTNOTEFT_GEN_A_CORE
POCT Blood Glucose:  232 mg/dL (02-22-25 @ 16:12)  238 mg/dL (02-22-25 @ 11:51)  193 mg/dL (02-22-25 @ 07:54)  152 mg/dL (02-21-25 @ 21:32)      eMAR:atorvastatin   40 milliGRAM(s) Oral (02-21-25 @ 21:02)    insulin glargine Injectable (LANTUS)   9 Unit(s) SubCutaneous (02-21-25 @ 22:30)    insulin lispro (ADMELOG) corrective regimen sliding scale   2 Unit(s) SubCutaneous (02-22-25 @ 17:08)   2 Unit(s) SubCutaneous (02-22-25 @ 12:11)   1 Unit(s) SubCutaneous (02-22-25 @ 08:11)    linagliptin   5 milliGRAM(s) Oral (02-22-25 @ 12:12)      Diet, Regular:   Consistent Carbohydrate {No Snacks} (CSTCHO)  Low Sodium  No Concentrated Phosphorus  Lacto Veg (Accepts Milk & Milk Products) (02-14-25 @ 13:42) [Active]      BGs and insulin regimen reviewed. Bgs 165-200s today with serum fasting bG 165 and prandial BGs in 200s   Will start standing meal coverage insulin for BG Goal 100-180mg/dl     - Start Admelog 2 units with meals ( Hold if NPO or eats less than 50 % of meals)         Contact via [employer] Teams during business hours  To reach covering provider access AMION via sunrise tools  For Urgent matters/after-hours/weekends/holidays please page endocrine fellow on call   For nonurgent matters please email [email or web address]    Please note that this patient may be followed by different provider tomorrow.  Notify endocrine 24 hours prior to discharge for final recommendations

## 2025-02-22 NOTE — CHART NOTE - NSCHARTNOTEFT_GEN_A_CORE
Patient noted to be cardiac optimized for CO2 angiogram    Planning for angio on Monday. Recommend dialyzing over weekend pending nephrology recs to ensure patient medically optimized for procedure    Vascular Surgery [id number]

## 2025-02-22 NOTE — PROGRESS NOTE ADULT - SUBJECTIVE AND OBJECTIVE BOX
MR#[id number]  PATIENT NAME:COLE ALBA    DATE OF SERVICE: 25 @ 05:45  Patient was seen and examined by Yordy Gilmore MD on    25 @ 05:45 .  Interim events noted.Consultant notes ,Labs,Telemetry reviewed by me       HOSPITAL COURSE: HPI:  64F, hx of CHF (last TTE on 25 EF 30-35%, G2DD), DM, diabetic foot ulcer with a recent admission at [facility] for CHF exacerbation presented as a transfer for worsening R. leg pain and fluid secretion, On non-invasive imaging demonstrating severe depletion of RLE blood flow beyond the popliteal vessel at knee and similar findings in L. Was transferred to [facility] for CO2 angiogram with Dr. Baltazar. (2025 20:05)      INTERIM EVENTS:Patient seen at bedside ,interim events noted.  -on  5mcg and Bumex gtt   02/10-OOB still orthopneac [id number] LE weakness and swelling  -c/o LE pain not dyspneac  held on Bumex 3 mg/Hr  -Awake seen by HF   -Remains on Bumex 3mg/Hr for possible LHC/RHC  -Had cath Multivessel CAD started on Milrinone for CTS evaluation albeit targets not optimal  02/15-Awake on Milrinone and Bumex gtt-seen by CTS not a surgical candidate-needs Vascular work-up for LE PAD-scheduled for Angiogram on Wednesday    Weight 170Kg---> 164 Kg--->161.1 Kg  -Awake is able to lie flat c/o RLE pain Sinus Rhythm UOP-2750mL,on Milrinone 0.25mcg Bumex 3mg/Hr,Metolazone 10mg  -Awake is able to lie flat remains on Milrinone and Bumex 3mg/Hr UOP-1500mL-  -Awake no chest pain or dyspnea at rest Hypertonic saline stopped Na 150,Bumex held remains on Milrinone gtt Sinus rhythm  UOP-1425mL  -Awake is off diuretics for cMR and CO2 angiogram  -Had cMD Plan for HD catheter placement to initiate Dialysis  -s/p HD catheter had HD with 500mL removal   -Awake c/o LE pain     PMH -reviewed admission note, no change since admission  HEART FAILURE: Acute[x ]Chronic[ ] Systolic[x ] Diastolic[ ] Combined Systolic and Diastolic[ ]  CAD[x ] CABG[ ] PCI[ ]  DEVICES[ ] PPM[ ] ICD[ ] ILR[ ]  ATRIAL FIBRILLATION[ ] Paroxysmal[ ] Permanent[ ] CHADS2-[  ]  JAMEL[x ] CKD1[ ] CKD2[ ] CKD3[ ] CKD4[ ] ESRD[ ]  COPD[ ] HTN[x ]   DM[x ] Type1[ ] Type 2[ x]   CVA[ ] Paresis[ ]    AMBULATION: Assisted[x ] Cane/walker[ ] Independent[ ]    MEDICATIONS  (STANDING):  ampicillin/sulbactam  IVPB 3 Gram(s) IV Intermittent every 12 hours  ampicillin/sulbactam  IVPB      aspirin enteric coated 81 milliGRAM(s) Oral daily  atorvastatin 40 milliGRAM(s) Oral at bedtime  benzocaine/menthol Lozenge 1 Lozenge Oral once  bisacodyl 5 milliGRAM(s) Oral every 12 hours  chlorhexidine 4% Liquid 1 Application(s) Topical <User Schedule>  dextrose 5%. 1000 milliLiter(s) (50 mL/Hr) IV Continuous <Continuous>  dextrose 5%. 1000 milliLiter(s) (100 mL/Hr) IV Continuous <Continuous>  dextrose 50% Injectable 25 Gram(s) IV Push once  dextrose 50% Injectable 12.5 Gram(s) IV Push once  dextrose 50% Injectable 25 Gram(s) IV Push once  glucagon  Injectable 1 milliGRAM(s) IntraMuscular once  heparin   Injectable 5000 Unit(s) SubCutaneous every 8 hours  insulin glargine Injectable (LANTUS) 9 Unit(s) SubCutaneous at bedtime  insulin lispro (ADMELOG) corrective regimen sliding scale   SubCutaneous three times a day before meals  insulin lispro (ADMELOG) corrective regimen sliding scale   SubCutaneous at bedtime  linagliptin 5 milliGRAM(s) Oral daily  milrinone Infusion 0.25 MICROgram(s)/kG/Min (5.41 mL/Hr) IV Continuous <Continuous>  mupirocin 2% Ointment 1 Application(s) Both Nostrils two times a day  pantoprazole  Injectable 40 milliGRAM(s) IV Push daily  polyethylene glycol 3350 17 Gram(s) Oral daily  senna 2 Tablet(s) Oral at bedtime  sodium chloride 0.9%. 250 milliLiter(s) (50 mL/Hr) IV Continuous <Continuous>    MEDICATIONS  (PRN):  acetaminophen     Tablet .. 650 milliGRAM(s) Oral every 6 hours PRN Mild Pain (1 - 3)  dextrose Oral Gel 15 Gram(s) Oral once PRN Blood Glucose LESS THAN 70 milliGRAM(s)/deciliter  HYDROmorphone   Solution 1 milliGRAM(s) Oral every 6 hours PRN Severe Pain (7 - 10)  melatonin 3 milliGRAM(s) Oral at bedtime PRN Insomnia  ondansetron Injectable 4 milliGRAM(s) IV Push every 6 hours PRN Nausea and/or Vomiting  sodium chloride 0.65% Nasal 1 Spray(s) Both Nostrils three times a day PRN Dryness  sodium chloride 0.9% lock flush 10 milliLiter(s) IV Push every 1 hour PRN Pre/post blood products, medications, blood draw, and to maintain line patency            REVIEW OF SYSTEMS:  Constitutional: [ ] fever, [ ]weight loss,  [x ]fatigue [ ]weight gain  Eyes: [ ] visual changes  Respiratory: [x ]shortness of breath;  [ ] cough, [ ]wheezing, [ ]chills, [ ]hemoptysis  Cardiovascular: [ ] chest pain, [ ]palpitations, [ ]dizziness,  [ ]leg swelling[ ]orthopnea[ ]PND  Gastrointestinal: [ ] abdominal pain, [ ]nausea, [ ]vomiting,  [ ]diarrhea [ ]Constipation [ ]Melena  Genitourinary: [ ] dysuria, [ ] hematuria [ ][place]  Neurologic: [ ] headaches [ ] tremors[ ]weakness [ ]Paralysis Right[ ] Left[ ]  Skin: [ ] itching, [ ]burning, [ ] rashes  Endocrine: [ ] heat or cold intolerance  Musculoskeletal: [ ] joint pain or swelling; [x ] muscle, back, or extremity pain  Psychiatric: [ ] depression, [ ]anxiety, [ ]mood swings, or [ ]difficulty sleeping  Hematologic: [ ] easy bruising, [ ] bleeding gums    [ ] All remaining systems negative except as per above.   [ ]Unable to obtain.  [x] No change in ROS since admission      Vital Signs Last 24 Hrs  T(C): 36.9 (2025 05:16), Max: 36.9 (2025 05:16)  T(F): 98.4 (2025 05:16), Max: 98.4 (2025 05:16)  HR: 103 (2025 05:16) (102 - 106)  BP: 113/69 (2025 05:16) (102/62 - 113/69)  BP(mean): 75 (2025 21:31) (75 - 75)  RR: 18 (2025 05:16) (18 - 18)  SpO2: 95% (2025 05:16) (94% - 97%)    Parameters below as of 2025 11:16  Patient On (Oxygen Delivery Method): room air      I&O's Summary    2025 07:01  -  2025 07:00  --------------------------------------------------------  IN: 700 mL / OUT: 1200 mL / NET: -500 mL    2025 07:01  -  2025 05:45  --------------------------------------------------------  IN: 590 mL / OUT: 2350 mL / NET: -1760 mL        PHYSICAL EXAM:  General: No acute distress BMI-24  HEENT: EOMI, PERRL  Neck: Supple, [ ] JVD  Lungs: Equal air entry bilaterally; [ ] rales [ ] wheezing [ ] rhonchi  Heart: Regular rate and rhythm; [x ] murmur   2/6 [ x] systolic [ ] diastolic [ ] radiation[ ] rubs [ ]  gallops  Abdomen: Nontender, bowel sounds present  Extremities: No clubbing, cyanosis, [ ] edema [ x]Bilateral lower extremity venous stasis wounds to dermis, mild serous drainage, no acute signs of infection. Left foot hallux distal tuft well adhered eschar, no acute signs of infection.   Nervous system:  Alert & Oriented X3, no focal deficits  Psychiatric: Normal affect  Skin: No rashes or lesions    LABS:      137  |  95[L]  |  66[H]  ----------------------------<  96  3.6   |  23  |  3.38[H]    Ca    9.4      2025 06:55  Phos  5.7       Mg     2.2         TPro  8.5[H]  /  Alb  3.4  /  TBili  1.2  /  DBili  x   /  AST  72[H]  /  ALT  103[H]  /  AlkPhos  171[H]      Creatinine Trend: 3.38<--, 4.76<--, 4.07<--, 3.49<--, 2.93<--, 2.95<--                        9.5    13.29 )-----------( 329      ( 2025 06:55 )             27.7     PT/INR - ( 2025 07:12 )   PT: 15.4 sec;   INR: 1.34 ratio         PTT - ( 2025 07:12 )  PTT:34.6 sec        TTE Limited W or WO Ultrasound Enhancing Agent (25 @ 12:39) >  CONCLUSIONS:      1. Limited TTE to assess for MR, IVC, LVOT, TR.   2. Left ventricular systolic function is severely decreased.   3. Reduced right ventricular systolic function.   4. LVOT VTI 12.0cm, Stroke volume 35cc. LVOT diameter 1.9cm.   5. Mild to moderate tricuspid regurgitation.   6. Estimated pulmonary artery systolic pressure is 32 mmHg, consistent with normal pulmonary artery pressure.   7. The inferior vena cava is normal in size measuring 1.70 cm in diameter, (normal <2.1cm) with abnormal inspiratory collapse (abnormal <50%) consistent with mildly elevated right atrial pressure (~8, range 5-10mmHg).   8. Compared to the transthoracic echocardiogram performed on 2/10/2025, RV and LV function still .             TTE W or WO Ultrasound Enhancing Agent (02.10.25 @ 12:09) >  CONCLUSIONS:      1. Left ventricular cavity is mildly dilated. Left ventricular systolic function is severely decreased with an ejection fraction of 20 % by Winchester's method of disks. Regional wall motion abnormalities present.   2. Multiple segmental abnormalities exist. See findings.   The entire septum, entire apex, entire inferior wall, mid inferolateral segment, and mid anterolateral segment are hypokinetic. All remaining scored segments are normal.     3. Reduced right ventricular systolic function.   4. No pericardial effusion seen.   5. Compared to the transthoracic echocardiogram performed on 2025, Worsening of the left ventricular function.        VA Duplex Lower Ext Vein Scan, Bilat (02.10.25 @ 17:42) >  IMPRESSION: No evidence of bilateral DVT within the imaged veins.        Nuclear Stress Test-Pharmacologic.. (25 @ 10:31) >  Conclusions:   1. Myocardial Perfusion: Abnormal.   2. Qualitative Perfusion:      - small-sized, moderate defect(s) in the apical wall that is predominantly fixed suggestive of an infarction with minimal marcus-infarct ischemia.   3. The post stress left ventricular EF is 25 %. The stress end diastolic volume is 118 ml.   4. Results D/Wcardiologist Dr. Gilmore at 18:31        RHC: RA 20, PA 49/29 (40), PCWP 35, mVO2 51.1, CO/CI 3.8/2.2, SVR 1095  LHC: RCA , severe ostial LM disease, diffuse disease in LAD and LCx, some L to R collaterals        MR Cardiac w/wo IV Cont (25 @ 09:44) >  IMPRESSION:.    The left ventricle demonstrates severe wall motion abnormalities and  function is depressed (calculated LVEF is 25%).    There is greater than 75% subendocardial scarring involving the basal to  mid inferior wall of the left ventricle.    There is left ventricular transmural scarring involving the apical septal  wall and likely near transmural scarring of the apical lateral wall.    There is about 50% scarring of the left ventricular basal inferoseptal  wall.

## 2025-02-22 NOTE — PROGRESS NOTE ADULT - ASSESSMENT
64F, hx of CHF (last TTE on 1/16/25 EF 30-35%, G2DD), DM, diabetic foot ulcer with a recent admission at [facility] for CHF exacerbation 1/14-1/17 p/w worsening R. leg pain and fluid secretion, was meeting sepsis criteria with podiatry having low suspicion for right cellulitis and admitted for continued management of HF exacerbation and needing ischemic eval.     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  -Left leg Arterial Duplex: Slightly tardus parvus waveform of the left popliteal artery is noted, for which underlying disease cannot be excluded. Posterior tibial artery waveform is nonpulsatile. Anterior tibial artery tardus parvus flow is noted.   Transferred to [facility] for CO2 angiogram as per vascular surgery    #  PAD Wound of lower extremity.   ·  Plan: Podiatry following, b/l serous bullae, no concerns for infection  Found to have RLE coolness  -Right Leg Arterial Duplex: Popliteal artery is occluded with negligible flow of right trifurcation arteries.  -Left leg Arterial Duplex: Slightly tardus parvus waveform of the left popliteal artery is noted, for which underlying disease cannot be excluded. Posterior tibial artery waveform is nonpulsatile. Anterior tibial artery tardus parvus flow is noted.  -Started on heparin gtt and dobutamine gtt -Will transfer to [facility] for CO2 angiogram as per vascular surgery as not candidate for CTA due to worsening SCr  -Keep compression dressing  -LE elevation above heart level at rest.  -Transferred to [facility] for CO2 angiogram   - Overall this patient is at   intermediate  risk (for cardiac death, nonfatal myocardial infarction, and nonfatal cardiac arrest perioperatively for this intermediate  risk procedure).   No cardiac contraindications for CO2 vangiogram  There  are  no further recommendation for risk stratifying imaging/stress testing prior to planned surgery  Seen by Podiatry-No acute pod intervention, local wound care only- Pod stable for discharge   PAD with rest ischemia plan for Angiogram -Dr Louis No cardiac contraindications for procedding with CO2 Angiogram,now that patient is on HD can get peripheral IV contrast angiogram-She is optimized for procedure        # HFrEF (congestive heart failure). Ischemic Cardiomyopathy  ·  Plan: Hx of HF, previous admission in January, on home Lasix 40 qD, Metoprolol Succ 25 qD. Not on Entresto due to insurance issues  Last TTE: LVSF moderately decreased w/ EF 30-35 %. Moderate G2DD. Mild MR  Stress test: small-sized, moderate defect(s) in the apical wall that is predominantly fixed suggestive of an infarction with minimal marcus-infarct ischemia  Ischemic cardiomyopathy  GDMT on hold 2/2 JAMEL    Repeat TTE EF 20% with WMA  Likely Ischemic cardiomyopathy despite NST revealing fixed defectLHC confirming  Multivessel CAD        RHC: RA 20, PA 49/29 (40), PCWP 35, mVO2 51.1, CO/CI 3.8/2.2, SVR 1095    # CAD  LHC-RCA , severe ostial LM disease, diffuse disease in LAD and LCx, some L to R collaterals-seen by CTS advise cMR for viability poor target vessels for CABG-?High risk LM/LAD PCI  Had cMR-LVEF is 25%, greater than 75% subendocardial scarring involving the basal to mid inferior wall of the left ventricle, left ventricular transmural scarring involving the apical septal wall and likely near transmural scarring of the apical lateral wall. 50% scarring of the left ventricular basal inferoseptal wall.  Will need High risk PCI vs CABG though less likely 2/2 poor target vessel      Started on Milrinone to assist augmented diuresis in attempt to avoid further renal failure  Has lost Weight 170---> 164 Kg---> 161.1 Kg---> 165 Kg  Creatinine gradually rising  Diuretic holiday  For HD catheter placement to initiate Dialysis  02/21-Started HD  02/22-For LE CO2 angiogram-No cardiac contraindications to proceeding with procedure      LE EDEMA no DVT      # JAMEL  Creatinine Trend: 3.38<--(HD)-- 4.76<--4.07<---3.90<-- 1.17  Renal function worsening

## 2025-02-23 LAB
ANION GAP SERPL CALC-SCNC: 17 MMOL/L — SIGNIFICANT CHANGE UP (ref 5–17)
BUN SERPL-MCNC: 36 MG/DL — HIGH (ref 7–23)
CALCIUM SERPL-MCNC: 9 MG/DL — SIGNIFICANT CHANGE UP (ref 8.4–10.5)
CHLORIDE SERPL-SCNC: 91 MMOL/L — LOW (ref 96–108)
CO2 SERPL-SCNC: 26 MMOL/L — SIGNIFICANT CHANGE UP (ref 22–31)
CREAT SERPL-MCNC: 1.89 MG/DL — HIGH (ref 0.5–1.3)
EGFR: 29 ML/MIN/1.73M2 — LOW
EGFR: 29 ML/MIN/1.73M2 — LOW
GLUCOSE BLDC GLUCOMTR-MCNC: 140 MG/DL — HIGH (ref 70–99)
GLUCOSE BLDC GLUCOMTR-MCNC: 184 MG/DL — HIGH (ref 70–99)
GLUCOSE BLDC GLUCOMTR-MCNC: 219 MG/DL — HIGH (ref 70–99)
GLUCOSE BLDC GLUCOMTR-MCNC: 222 MG/DL — HIGH (ref 70–99)
GLUCOSE SERPL-MCNC: 171 MG/DL — HIGH (ref 70–99)
MAGNESIUM SERPL-MCNC: 2 MG/DL — SIGNIFICANT CHANGE UP (ref 1.6–2.6)
PHOSPHATE SERPL-MCNC: 2.4 MG/DL — LOW (ref 2.5–4.5)
POTASSIUM SERPL-MCNC: 3.2 MMOL/L — LOW (ref 3.5–5.3)
POTASSIUM SERPL-SCNC: 3.2 MMOL/L — LOW (ref 3.5–5.3)
SODIUM SERPL-SCNC: 134 MMOL/L — LOW (ref 135–145)

## 2025-02-23 RX ORDER — ISOSORBDIE DINITRATE 30 MG/1
5 TABLET ORAL THREE TIMES A DAY
Refills: 0 | Status: DISCONTINUED | OUTPATIENT
Start: 2025-02-23 | End: 2025-03-07

## 2025-02-23 RX ORDER — INSULIN GLARGINE-YFGN 100 [IU]/ML
7 INJECTION, SOLUTION SUBCUTANEOUS AT BEDTIME
Refills: 0 | Status: DISCONTINUED | OUTPATIENT
Start: 2025-02-23 | End: 2025-02-24

## 2025-02-23 RX ORDER — POTASSIUM PHOSPHATE, MONOBASIC POTASSIUM PHOSPHATE, DIBASIC INJECTION, 236; 224 MG/ML; MG/ML
15 SOLUTION, CONCENTRATE INTRAVENOUS ONCE
Refills: 0 | Status: DISCONTINUED | OUTPATIENT
Start: 2025-02-23 | End: 2025-02-23

## 2025-02-23 RX ORDER — HYDROMORPHONE/SOD CHLOR,ISO/PF 2 MG/10 ML
1 SYRINGE (ML) INJECTION EVERY 6 HOURS
Refills: 0 | Status: DISCONTINUED | OUTPATIENT
Start: 2025-02-23 | End: 2025-03-02

## 2025-02-23 RX ADMIN — INSULIN LISPRO 2: 100 INJECTION, SOLUTION INTRAVENOUS; SUBCUTANEOUS at 08:33

## 2025-02-23 RX ADMIN — INSULIN LISPRO 2 UNIT(S): 100 INJECTION, SOLUTION INTRAVENOUS; SUBCUTANEOUS at 08:34

## 2025-02-23 RX ADMIN — AMPICILLIN SODIUM AND SULBACTAM SODIUM 200 GRAM(S): 1; .5 INJECTION, POWDER, FOR SOLUTION INTRAMUSCULAR; INTRAVENOUS at 21:18

## 2025-02-23 RX ADMIN — Medication 40 MILLIGRAM(S): at 11:05

## 2025-02-23 RX ADMIN — Medication 81 MILLIGRAM(S): at 11:04

## 2025-02-23 RX ADMIN — Medication 40 MILLIEQUIVALENT(S): at 11:04

## 2025-02-23 RX ADMIN — Medication 1 MILLIGRAM(S): at 22:20

## 2025-02-23 RX ADMIN — ATORVASTATIN CALCIUM 40 MILLIGRAM(S): 80 TABLET, FILM COATED ORAL at 21:19

## 2025-02-23 RX ADMIN — HEPARIN SODIUM 5000 UNIT(S): 1000 INJECTION INTRAVENOUS; SUBCUTANEOUS at 13:24

## 2025-02-23 RX ADMIN — Medication 1 APPLICATION(S): at 08:10

## 2025-02-23 RX ADMIN — Medication 1 MILLIGRAM(S): at 08:35

## 2025-02-23 RX ADMIN — LINAGLIPTIN 5 MILLIGRAM(S): 5 TABLET, FILM COATED ORAL at 11:05

## 2025-02-23 RX ADMIN — MUPIROCIN CALCIUM 1 APPLICATION(S): 20 CREAM TOPICAL at 17:48

## 2025-02-23 RX ADMIN — INSULIN LISPRO 2 UNIT(S): 100 INJECTION, SOLUTION INTRAVENOUS; SUBCUTANEOUS at 17:47

## 2025-02-23 RX ADMIN — MUPIROCIN CALCIUM 1 APPLICATION(S): 20 CREAM TOPICAL at 05:12

## 2025-02-23 RX ADMIN — Medication 5 MILLIGRAM(S): at 05:12

## 2025-02-23 RX ADMIN — HEPARIN SODIUM 5000 UNIT(S): 1000 INJECTION INTRAVENOUS; SUBCUTANEOUS at 21:20

## 2025-02-23 RX ADMIN — INSULIN LISPRO 2: 100 INJECTION, SOLUTION INTRAVENOUS; SUBCUTANEOUS at 17:47

## 2025-02-23 RX ADMIN — INSULIN GLARGINE-YFGN 7 UNIT(S): 100 INJECTION, SOLUTION SUBCUTANEOUS at 21:20

## 2025-02-23 RX ADMIN — Medication 1 MILLIGRAM(S): at 21:18

## 2025-02-23 RX ADMIN — HEPARIN SODIUM 5000 UNIT(S): 1000 INJECTION INTRAVENOUS; SUBCUTANEOUS at 05:12

## 2025-02-23 RX ADMIN — AMPICILLIN SODIUM AND SULBACTAM SODIUM 200 GRAM(S): 1; .5 INJECTION, POWDER, FOR SOLUTION INTRAMUSCULAR; INTRAVENOUS at 11:04

## 2025-02-23 RX ADMIN — Medication 1 MILLIGRAM(S): at 09:35

## 2025-02-23 NOTE — PRE PROCEDURE NOTE - ATTENDING COMMENTS
Asked by Dr. Baltazar to perform LE angio.  [facility] for 2/19 peniding med/renal/cardio optimization  DW'ed med attd
bilateral leg angio

## 2025-02-23 NOTE — PROGRESS NOTE ADULT - ASSESSMENT
64y Female with history of CHF presents as a transfer for LE CO2 angiogram. Nephrology consulted for elevated Scr.    1) JAMEL: likely due to ATN in addition to CRS.  HD was started principally for UF as diuretics had not been sufficient. Now with UF with HD, it seems cardiac function has improved leading to improved renal perfusion as pt is now responding to diuretics well whereas before she was not. Pt is optimized for vascular procedure tomorrow.  Will monitor daily for need for HD.  It is unclear if she will require dialysis long-term but there is concern that it will be required to maintain adequate volume status.    2) HTN: BP low normal. Monitor off anti-hypertensive medications and on diuretics.    3) LE edema: In the setting of CHF/volume overload with renal failure.  Not secondary to nephrotic syndrome given subnephrotic range proteinuria. Pt continues on milrinone gtt as per Cardiology.  Continue diuretics as now pt is successfully diuresing.  TTE with severely decreased LVSF. Monitor UO.     4) Hyperphosphatemia: Resolved.  Hypophosphatemia borderline following HD.  Would just monitor as this is likely to increase on its own.    5) Hypokalemia- due to diuretics.  Pt was dialyzed with 4mEq/L K+ dialysate.  Gentle repletion.    5. Hypernatremia: Due to receiving 3% hypertonic saline, has since resolved after holding on 2/17. Cardiology was providing this to augment urinary output.  Na+ decreased and is now borderline low due to volume overload.  HD would help.  Diuretic dosing may help as well. Monitor daily.    [facility] NEPHROLOGY  Raji Waterman M.D.  Mars Feliz D.O.  Kelley Parks M.D.  MD Nancy Kulkarni, MSN, ANP-C    Telephone: [phone]  Facsimile: [phone] [id number], [address]  [place] [address]

## 2025-02-23 NOTE — PROGRESS NOTE ADULT - SUBJECTIVE AND OBJECTIVE BOX
SURGERY DAILY PROGRESS NOTE    24 Hour/Overnight Events: No acute events overnight    SUBJECTIVE: Patient seen and evaluated on AM rounds.   ------------------------------------------------------------------------------------------------------------  OBJECTIVE:  Vital Signs Last 24 Hrs  T(C): 37 (23 Feb 2025 07:55), Max: 37.1 (22 Feb 2025 16:16)  T(F): 98.6 (23 Feb 2025 07:55), Max: 98.8 (22 Feb 2025 16:16)  HR: 102 (23 Feb 2025 07:55) (102 - 105)  BP: 108/68 (23 Feb 2025 07:55) (106/59 - 120/68)  BP(mean): --  RR: 18 (23 Feb 2025 07:55) (16 - 18)  SpO2: 94% (23 Feb 2025 07:55) (93% - 98%)    Parameters below as of 23 Feb 2025 07:55  Patient On (Oxygen Delivery Method): room air      I&O's Detail    22 Feb 2025 07:01  -  23 Feb 2025 07:00  --------------------------------------------------------  IN:    Oral Fluid: 720 mL  Total IN: 720 mL    OUT:    Intermittent Catheterization - Urethral (mL): 1280 mL    Other (mL): 1000 mL    Voided (mL): 550 mL  Total OUT: 2830 mL    Total NET: -2110 mL          LABS:                        9.4    13.00 )-----------( 295      ( 22 Feb 2025 07:16 )             26.8     02-23    134[L]  |  91[L]  |  36[H]  ----------------------------<  171[H]  3.2[L]   |  26  |  1.89[H]    Ca    9.0      23 Feb 2025 07:03  Phos  2.4     02-23  Mg     2.0     02-23          Urinalysis Basic - ( 23 Feb 2025 07:03 )    Color: x / Appearance: x / SG: x / pH: x  Gluc: 171 mg/dL / Ketone: x  / Bili: x / Urobili: x   Blood: x / Protein: x / Nitrite: x   Leuk Esterase: x / RBC: x / WBC x   Sq Epi: x / Non Sq Epi: x / Bacteria: x      Physical Exam:  General: NAD, resting in bed comfortably  Cardiac: Regular rate, normotensive  Respiratory: Nonlabored respirations, normal cw expansion, on RA  Neuro: AOx3, CNII-XII intact on gross examination  Vascular/Extremities: Warm, well perfused        RLE: Dressings in place, blistering and ulceration on the dorsal aspect of the foot        LLE: First digit dry gangrene on the plantar aspec

## 2025-02-23 NOTE — PROGRESS NOTE ADULT - ASSESSMENT
64F, hx of CHF (last TTE on 1/16/25 EF 30-35%, G2DD), DM, diabetic foot ulcer with a recent admission at [facility] for CHF exacerbation 1/14-1/17 p/w worsening R. leg pain and fluid secretion, was meeting sepsis criteria with podiatry having low suspicion for right cellulitis and admitted for continued management of HF exacerbation and needing ischemic eval.     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  -Left leg Arterial Duplex: Slightly tardus parvus waveform of the left popliteal artery is noted, for which underlying disease cannot be excluded. Posterior tibial artery waveform is nonpulsatile. Anterior tibial artery tardus parvus flow is noted.   Transferred to [facility] for CO2 angiogram as per vascular surgery    #  PAD Wound of lower extremity.   ·  Plan: Podiatry following, b/l serous bullae, no concerns for infection  Found to have RLE coolness  -Right Leg Arterial Duplex: Popliteal artery is occluded with negligible flow of right trifurcation arteries.  -Left leg Arterial Duplex: Slightly tardus parvus waveform of the left popliteal artery is noted, for which underlying disease cannot be excluded. Posterior tibial artery waveform is nonpulsatile. Anterior tibial artery tardus parvus flow is noted.  -Started on heparin gtt and dobutamine gtt -Will transfer to [facility] for CO2 angiogram as per vascular surgery as not candidate for CTA due to worsening SCr  -Keep compression dressing  -LE elevation above heart level at rest.  -Transferred to [facility] for CO2 angiogram   - Overall this patient is at   intermediate  risk (for cardiac death, nonfatal myocardial infarction, and nonfatal cardiac arrest perioperatively for this intermediate  risk procedure).   No cardiac contraindications for CO2 vangiogram  There  are  no further recommendation for risk stratifying imaging/stress testing prior to planned surgery  Seen by Podiatry-No acute pod intervention, local wound care only-         PAD with rest ischemia plan for Angiogram No cardiac contraindications for procedding with CO2 Angiogram,-She is optimized for procedure  02/23-Had HD yesterday Wt down to 151 Kg  Fluid status has improved      # HFrEF (congestive heart failure). Ischemic Cardiomyopathy  ·  Plan: Hx of HF, previous admission in January, on home Lasix 40 qD, Metoprolol Succ 25 qD. Not on Entresto due to insurance issues  Last TTE: LVSF moderately decreased w/ EF 30-35 %. Moderate G2DD. Mild MR  Stress test: small-sized, moderate defect(s) in the apical wall that is predominantly fixed suggestive of an infarction with minimal marcus-infarct ischemia  Ischemic cardiomyopathy  GDMT on hold 2/2 JAMEL    Repeat TTE EF 20% with WMA RAP~~8  Likely Ischemic cardiomyopathy despite NST revealing fixed defectLHC confirming  Multivessel CAD  Remains on Milrinone add Hydrall/isosorbide      RHC: RA 20, PA 49/29 (40), PCWP 35, mVO2 51.1, CO/CI 3.8/2.2, SVR 1095    # CAD  LHC-RCA , severe ostial LM disease, diffuse disease in LAD and LCx, some L to R collaterals-seen by CTS advise cMR for viability poor target vessels for CABG-?High risk LM/LAD PCI  Had cMR-LVEF is 25%, greater than 75% subendocardial scarring involving the basal to mid inferior wall of the left ventricle, left ventricular transmural scarring involving the apical septal wall and likely near transmural scarring of the apical lateral wall. 50% scarring of the left ventricular basal inferoseptal wall.  Will need High risk PCI vs CABG though less likely 2/2 poor target vessel      Started on Milrinone to assist augmented diuresis in attempt to avoid further renal failure  Has lost Weight 170---> 164 Kg---> 161.1 Kg---> 165 Kg-->151.2 Kg    Diuretic holiday  For HD catheter placement to initiate Dialysis  02/21-Started HD-500mL  02/22-HD 1000mL  02/22-For LE CO2 angiogram-No cardiac contraindications to proceeding with procedure      LE EDEMA no DVT      # JAMEL  Creatinine Trend: 3.02<--3.38<--(HD)-- 4.76<--4.07<---3.90<-- 1.17

## 2025-02-23 NOTE — PROGRESS NOTE ADULT - ASSESSMENT
63 YO F with PMHx of HFrEF 30-35 and grade 2 ddfxn (last TTE on 01/16/25), DM2, and diabetic foot ulcer. Recent admission at [facility] for ADHF. Patient now represents to OSH and ultimately to [facility] as a transfer for worsening right leg pain and fluid secretion. As per documentation, patient was reported to have severe depletion of RLE blood flow beyond the popliteal vessel at knee and similar findings in the left. Patient was transferred to [facility] for CO2 angiogram with Dr. Baltazar. Of note, patient also with reported visual disturbance for which patient was seen and evaluated by opthalmology. Internal Medicine has been consulted on Ms. Kirby's care for medical management.     # ADHF/ BiV HFrEF 20   - Recent admission at [facility] for ADHF and on dobutamine outpatient  - TTE 1/16 with EF 30-35 with moderately reduced LVSF and grade 2 ddfxn, normal RVSF , trace TR/ KY, and trace pericardial effusion  - NST abnormal with small-sized, moderate defect in apical wall that is predominantly fixed suggestive of an infarction with minimal marcus-infarct ischemia. Post stress LVEF 25.  - CT Chest with small BL pleural effusions and small pericardial effusion.  - RPT TTE with EF 20, severely decreased LV, decreased RVSF with TAPSE 1.2, and regional wall motion abnormalities present with entire septum, entire apex, entire inferior wall, mid inferolateral segment, and mid anterolateral segment are hypokinetic.   - RHC with RA 20, PA 49/29 (40), PCWP 35, mVO2 51.1, CO/CI 3.8/2.2, SVR 1095 w/ Multivessel CAD   - s/p zaroxyln 10 QD  - Continue on milrinone 0.125mcg GTT (decreased 2/18) with aldactone for now  - CRE and sodium rising and bumex GTT stopped 2/18 and HTS stopped 2/16   - RPT limited TTE w/ LVSF  severely decreased, Reduced RVSF, LVOT VTI 12.0cm, Mild to mod TR, mildly elevated right atrial pressure  - HF following and adjusting medications   - Case discussed with EP and needs to be on GDMT for 3 months before AICD placement and should be stabilized off of inotropic support.   - Monitor I and O, diuresis per Cardio/ Renal    - GDMT as per Cardio  - Serial CXR   - Monitor volume status   - Check daily weights    - Monitor on telemetry; Monitor electrolytes   - Cardio, HF, Renal, and EP evals appreciated; F/u recs     # CAD with TVD   - [facility] with RCA , severe ostial LM disease, diffuse disease in LAD and LCx, some L to R collaterals (Multivessel CAD)  - Case discussed with CTSx and NOT a candidate for CABG due to comorbidities  - Cardiac MRI done F/u Cardio   - AS and Statin   - Cardiology following     # BL LE Edema likely in setting of ADHF  - Diuresis as per Cardio, HF and Renal   - BL LE elevation and compression  - LE Duplex neg   - Monitor for now  - Podiatry and Vascular evals appreciated; F/u recs    # Pericardial effusion likely in setting ADHF  - TTE with trace pericardial effusion   - CT Chest with small pericardial effusion   - Denies chest pain, palpitations, chest tightness or discomfort, shortness of breath or dyspnea   - Repeat TTE in 1 month for further evaluation   - Diuresis per cardio/ renal.  - Monitor on telemetry  - Cardio following    # Pleural effusion likely in setting ADHF  - CT Chest with small BL pleural effusions  - On RA with no respiratory complaints at present  - Monitor O2 saturation  - Supplement to maintain > 90%   - Diuresis per cardio/ renal.     # JAMEL with Hyponatremia and Metabolic Acidosis   - Baseline CRE 0.7-1.2 and increased to ~4  - As per OSH documentation, JAMEL was thought to be second to Unasyn/ Bumex / Entresto use and Hypotension   - US RENAL with no hydronephrosis  - CRE improved slightly with diuresis/ inotropic support and likely cardiorenal induced with low flow state in ADHF, however now rising again and concern for milrinone vs overdiuresis (prerenal) vs cardiorenal.   - Milrinone adjusted as above and diuresis remains off per HF .   - Remains nonoliguric, S/P Shiley 2/20 w/ IR and initiation of HD .  - HF following and adjusting medications.    - Continue with HF support as above  - Avoid nephrotoxic agents  - Monitor Cr and daily BMP   - Renal eval appreciated    # Transaminitis  - Likely 2/2 hepatic congestion   - Volume removal with HD as tolerated  - Trend LFTs, if uptrend post-HD initiation, check ABD US  - Serial ABD Examinations    # Retention   - Pt with + bladder scan   - Check additional scan  - Place stiles as per protocol if +     # Hypernatremia   - Hypernatremia likely from HTS vs over diuresis/ intravascular dehydraiton   - Hold further HTS   - Sodium improved   - Monitor sodium     # Leukocytosis likely in setting of BL LE ulcers with pop occlusion   - BCx negative   - UCx with probable contamination   - On unasyn for ABX. Frequency increased to Q12 as per Renal   - Leukocytosis fluctuating, however appears nontoxic with no fever spikes and monitoring closely on below unasyn.   - If febrile check pan cultures   - Trend CBC, temp curve, VS and adjust as tolerated    # Foot ulcers with worsening RLE pain second to pop artery occlusion +/- diabetic ulcers   - RLE Arterial Duplex with popliteal artery occlusion   - LLE Arterial Duplex with underlying disease cannot be excluded   - Patient transferred to [facility] for CO2 angiogram, however given ADHF currently not medically optimized and high risk. Plan to continue on diuresis and inotropic support as above   - Continue on Lipitor  - Was on Heparin GTT for now and now on HSQ  - Continue pain control with oxy  - Wound care called for dressing recs   - Vascular following , plan for angio week monday     # Tachycardia likely pain related   - On Toprol and improved  - Continue to monitor on tele   - Cardio eval appreciated    # Visual Disturbance  - CT Head w/ old infarcts  - On ASA and Statin   - Opthalmology eval appreciated    # Indigestion and Constipation   - PPI, miralax and senna continued  - Monitor BMs    # Anemia likely mixed AOCD vs iron deficiency   - HH stable in 9s on HSQ  - Consider iron tabs when optimized   - Monitor HH   - Transfuse for Hgb < 8  - Maintain active T/S    # Diabetes Mellitus A1C 11.6   - Continue on lantus 10 with tradjecta 5 and ISS   - Diabetic DASH diet   - Monitor and adjust glucose levels PRN   - Endocrine following; F/u recs     # HLD and HLD  - Continue on lipitor and toprol  - Monitor BP    # DISPO TBD            discussed in detail with patient brother over th ephone. pain meds adjusted   all questions answered

## 2025-02-23 NOTE — CHART NOTE - NSCHARTNOTEFT_GEN_A_CORE
Diet, NPO after Midnight:      NPO Start Date: 23-Feb-2025,   NPO Start Time: 23:59 (02-23-25 @ 08:41) [Active]  Diet, NPO after Midnight:      NPO Start Date: 23-Feb-2025,   NPO Start Time: 23:59 (02-23-25 @ 08:37) [Active]  Diet, Regular:   Consistent Carbohydrate {No Snacks} (CSTCHO)  Low Sodium  No Concentrated Phosphorus  Lacto Veg (Accepts Milk & Milk Products) (02-14-25 @ 13:42) [Active]        POCT Blood Glucose:  222 mg/dL (02-23-25 @ 16:50)  140 mg/dL (02-23-25 @ 11:19)  219 mg/dL (02-23-25 @ 07:53)  180 mg/dL (02-22-25 @ 22:48)      eMAR:atorvastatin   40 milliGRAM(s) Oral (02-22-25 @ 22:18)    insulin glargine Injectable (LANTUS)   9 Unit(s) SubCutaneous (02-22-25 @ 22:55)    insulin lispro (ADMELOG) corrective regimen sliding scale   2 Unit(s) SubCutaneous (02-23-25 @ 17:47)   2 Unit(s) SubCutaneous (02-23-25 @ 08:33)    insulin lispro Injectable (ADMELOG)   2 Unit(s) SubCutaneous (02-23-25 @ 17:47)   2 Unit(s) SubCutaneous (02-23-25 @ 08:34)    linagliptin   5 milliGRAM(s) Oral (02-23-25 @ 11:05)       Noted pt will be NPO after MN for RLE angiogram tomorrow     - Will decrease Lantus to 7 units tonight for NPO after MN, increase back to 9 units after angiogram       To reach covering provider access AMION via sun[employer] tools  For Urgent matters/after-hours/weekends/holidays please page endocrine fellow on call   For nonurgent matters please email [email or web address]    Please note that this patient may be followed by different provider tomorrow.  Notify endocrine 24 hours prior to discharge for final recommendations

## 2025-02-23 NOTE — PROGRESS NOTE ADULT - SUBJECTIVE AND OBJECTIVE BOX
Name of Patient : TOMASZ ALBA  MRN: [id number]  Date of visit: 02-23-25       Subjective: Patient seen and examined. No new events except as noted.   more awake     REVIEW OF SYSTEMS:    CONSTITUTIONAL: No weakness, fevers or chills  EYES/ENT: No visual changes;  No vertigo or throat pain   NECK: No pain or stiffness  RESPIRATORY: No cough, wheezing, hemoptysis; No shortness of breath  CARDIOVASCULAR: No chest pain or palpitations  GASTROINTESTINAL: No abdominal or epigastric pain. No nausea, vomiting, or hematemesis; No diarrhea or constipation. No melena or hematochezia.  GENITOURINARY: No dysuria, frequency or hematuria  NEUROLOGICAL: No numbness or weakness  SKIN: No itching, burning, rashes, or lesions   All other review of systems is negative unless indicated above.    MEDICATIONS:  MEDICATIONS  (STANDING):  ampicillin/sulbactam  IVPB 3 Gram(s) IV Intermittent every 12 hours  ampicillin/sulbactam  IVPB      aspirin enteric coated 81 milliGRAM(s) Oral daily  atorvastatin 40 milliGRAM(s) Oral at bedtime  benzocaine/menthol Lozenge 1 Lozenge Oral once  bisacodyl 5 milliGRAM(s) Oral every 12 hours  chlorhexidine 4% Liquid 1 Application(s) Topical <User Schedule>  dextrose 5%. 1000 milliLiter(s) (50 mL/Hr) IV Continuous <Continuous>  dextrose 5%. 1000 milliLiter(s) (100 mL/Hr) IV Continuous <Continuous>  dextrose 50% Injectable 25 Gram(s) IV Push once  dextrose 50% Injectable 12.5 Gram(s) IV Push once  dextrose 50% Injectable 25 Gram(s) IV Push once  glucagon  Injectable 1 milliGRAM(s) IntraMuscular once  heparin   Injectable 5000 Unit(s) SubCutaneous every 8 hours  hydrALAZINE 10 milliGRAM(s) Oral three times a day  insulin glargine Injectable (LANTUS) 7 Unit(s) SubCutaneous at bedtime  insulin lispro (ADMELOG) corrective regimen sliding scale   SubCutaneous three times a day before meals  insulin lispro (ADMELOG) corrective regimen sliding scale   SubCutaneous at bedtime  insulin lispro Injectable (ADMELOG) 2 Unit(s) SubCutaneous three times a day with meals  isosorbide   dinitrate Tablet (ISORDIL) 5 milliGRAM(s) Oral three times a day  linagliptin 5 milliGRAM(s) Oral daily  milrinone Infusion 0.25 MICROgram(s)/kG/Min (5.41 mL/Hr) IV Continuous <Continuous>  mupirocin 2% Ointment 1 Application(s) Both Nostrils two times a day  pantoprazole  Injectable 40 milliGRAM(s) IV Push daily  polyethylene glycol 3350 17 Gram(s) Oral daily  senna 2 Tablet(s) Oral at bedtime      PHYSICAL EXAM:  T(C): 37.2 (02-23-25 @ 20:09), Max: 37.2 (02-23-25 @ 20:09)  HR: 106 (02-23-25 @ 20:09) (102 - 106)  BP: 97/64 (02-23-25 @ 20:09) (97/64 - 109/65)  RR: 18 (02-23-25 @ 20:09) (18 - 18)  SpO2: 95% (02-23-25 @ 20:09) (93% - 95%)  Wt(kg): --  I&O's Summary    22 Feb 2025 07:01  -  23 Feb 2025 07:00  --------------------------------------------------------  IN: 720 mL / OUT: 2830 mL / NET: -2110 mL    23 Feb 2025 07:01  -  23 Feb 2025 23:20  --------------------------------------------------------  IN: 340 mL / OUT: 1000 mL / NET: -660 mL          Appearance: Normal	  HEENT:  PERRLA   Lymphatic: No lymphadenopathy   Cardiovascular: Normal S1 S2, no JVD  Respiratory: normal effort , clear  Gastrointestinal:  Soft, Non-tender  Skin: No rashes,  warm to touch  Psychiatry:  Mood & affect appropriate  Musculuskeletal: No edema    recent labs, Imaging and EKGs personally reviewed       02-22-25 @ 07:01  -  02-23-25 @ 07:00  --------------------------------------------------------  IN: 720 mL / OUT: 2830 mL / NET: -2110 mL    02-23-25 @ 07:01  -  02-23-25 @ 23:20  --------------------------------------------------------  IN: 340 mL / OUT: 1000 mL / NET: -660 mL                          9.4    13.00 )-----------( 295      ( 22 Feb 2025 07:16 )             26.8               02-23    134[L]  |  91[L]  |  36[H]  ----------------------------<  171[H]  3.2[L]   |  26  |  1.89[H]    Ca    9.0      23 Feb 2025 07:03  Phos  2.4     02-23  Mg     2.0     02-23                         Urinalysis Basic - ( 23 Feb 2025 07:03 )    Color: x / Appearance: x / SG: x / pH: x  Gluc: 171 mg/dL / Ketone: x  / Bili: x / Urobili: x   Blood: x / Protein: x / Nitrite: x   Leuk Esterase: x / RBC: x / WBC x   Sq Epi: x / Non Sq Epi: x / Bacteria: x

## 2025-02-23 NOTE — PROGRESS NOTE ADULT - SUBJECTIVE AND OBJECTIVE BOX
[facility] NEPHROLOGY- PROGRESS NOTE    64y Female with history of CHF presents as a transfer for LE CO2 angiogram. Nephrology consulted for elevated Scr.  Pain in lower extremities has decreased but is still present.     Today pt is tired, less interactive. Family at bedside and on phone to discuss.    Pt s/p HD again yesterday in preparation for vascular procedure tomorrow.  Tolerated well.  Diuretics restarted yesterday as well.    REVIEW OF SYSTEMS:  Gen: no fevers  Cards: no chest pain  Resp: + dyspnea with exertion  GI: no nausea and vomiting, no diarrhea  Vascular: Stable LE edema.  Msk: Persistent pain upon moving legs.        Vital Signs Last 24 Hrs  T(C): 36.4 (22 Feb 2025 12:19), Max: 36.9 (22 Feb 2025 05:16)  T(F): 97.5 (22 Feb 2025 12:19), Max: 98.4 (22 Feb 2025 05:16)  HR: 103 (22 Feb 2025 12:19) (103 - 106)  BP: 120/68 (22 Feb 2025 12:19) (102/62 - 120/68)  BP(mean): 75 (21 Feb 2025 21:31) (75 - 75)  RR: 18 (22 Feb 2025 12:19) (18 - 18)  SpO2: 98% (22 Feb 2025 12:19) (95% - 98%)    Parameters below as of 22 Feb 2025 12:19  Patient On (Oxygen Delivery Method): room air    I&O's Detail    22 Feb 2025 07:01  -  23 Feb 2025 07:00  --------------------------------------------------------  IN:    Oral Fluid: 720 mL  Total IN: 720 mL    OUT:    Intermittent Catheterization - Urethral (mL): 1280 mL    Other (mL): 1000 mL    Voided (mL): 550 mL  Total OUT: 2830 mL    Total NET: -2110 mL      23 Feb 2025 07:01  -  23 Feb 2025 14:29  --------------------------------------------------------  IN:    Oral Fluid: 120 mL  Total IN: 120 mL    OUT:  Total OUT: 0 mL    Total NET: 120 mL          PHYSICAL EXAM:  Gen: NAD, calm  Cards: RRR, +S1/S2, no M/G/R  Resp: CTAB today  GI: soft, NT/ND, NABS  : + stiles with large amount of yellow urine.  Vascular: 1+ RLE edema > LLE edema with legs wrapped but seems improved from prior examinations.      LABS:  02-23    134[L]  |  91[L]  |  36[H]  ----------------------------<  171[H]  3.2[L]   |  26  |  1.89[H]    Ca    9.0      23 Feb 2025 07:03  Phos  2.4     02-23  Mg     2.0     02-23      Creatinine Trend: 1.89 <--, 3.02 <--, 3.38 <--, 4.76 <--, 4.07 <--, 3.49 <--, 2.93 <--, 2.95 <--                        9.4    13.00 )-----------( 295      ( 22 Feb 2025 07:16 )             26.8     Urine Studies:  Urinalysis Basic - ( 23 Feb 2025 07:03 )    Color:  / Appearance:  / SG:  / pH:   Gluc: 171 mg/dL / Ketone:   / Bili:  / Urobili:    Blood:  / Protein:  / Nitrite:    Leuk Esterase:  / RBC:  / WBC    Sq Epi:  / Non Sq Epi:  / Bacteria:

## 2025-02-23 NOTE — PROGRESS NOTE ADULT - ASSESSMENT
64y.o. Female with PAD, CLTI affecting b/l LE, CHF, chronic b/l LE wound R worsen then the L, with R foot blistering and ulceration was transfer to [facility] for CO2 angiogram. Currently patient Cr is elevated, not medically optimized for the procedure. Patient is currently followed by medicine and cardiology.     Recommendations:  -RLE Angiogram tomorrow 2/24 with Dr. Baltazar  - Dialyzed per nephro yesterday, please reach out to nephro whether patient needs HD today   - NPO @ 12 am  - 4 am labs   - Continue ASA/statin  - cards/meds cleared for CO2 angio   - Consented    Vascular Surgery  [id number]

## 2025-02-23 NOTE — PRE PROCEDURE NOTE - PRE PROCEDURE EVALUATION
Preop Diagnosis: PAD   Planned Procedure: RLE angiogram, possible angioplasty, possible stent  Surgeon: Dr. Baltazar     Vital Signs Last 24 Hrs  T(C): 37 (2025 07:55), Max: 37.1 (2025 16:16)  T(F): 98.6 (2025 07:55), Max: 98.8 (2025 16:16)  HR: 102 (:55) (102 - 105)  BP: 108/68 (2025 07:55) (106/59 - 120/68)  BP(mean): --  RR: 18 (:) (16 - 18)  SpO2: 94% () (93% - 98%)    Parameters below as of :55  Patient On (Oxygen Delivery Method): room air      Daily     Daily Weight in k.6 (2025 05:28)    Pertinent Labs:                        9.4    13.00 )-----------( 295      ( 2025 07:16 )             26.8     02    134[L]  |  91[L]  |  36[H]  ----------------------------<  171[H]  3.2[L]   |  26  |  1.89[H]    Ca    9.0      2025 07:03  Phos  2.4     02  Mg     2.0     02        ------------------------------------------------------------------------------------------------------------------  Assessment:  64y.o. Female with PAD, CLTI affecting b/l LE, CHF, chronic b/l LE wound R worsen then the L, with R foot blistering and ulceration was transfer to [facility] for CO2 angiogram. Currently patient Cr is elevated, not medically optimized for the procedure. Patient is currently followed by medicine and cardiology.     Plan:  - OR 25 for RLE angiogram, possible angioplasty, possible stent  with Dr. Baltazar   - NPO after midnight, except medications   - IVF while NPO  - Consent signed in chart  - Pre-op Labs at 4:00 am on /: CBC, BMP, Mag, Phos, PT/PTT/INR, T&S   - Cardiac clearance documented on   - Medicine clearance documented on   - Anticoagulation (Lovenox SQ) held       Surgery  p Preop Diagnosis: PAD   Planned Procedure: RLE angiogram, possible angioplasty, possible stent  Surgeon: Dr. Baltazar     Vital Signs Last 24 Hrs  T(C): 37 (2025 07:55), Max: 37.1 (2025 16:16)  T(F): 98.6 (2025 07:55), Max: 98.8 (2025 16:16)  HR: 102 (:55) (102 - 105)  BP: 108/68 (2025 07:55) (106/59 - 120/68)  BP(mean): --  RR: 18 (:) (16 - 18)  SpO2: 94% () (93% - 98%)    Parameters below as of :55  Patient On (Oxygen Delivery Method): room air      Daily     Daily Weight in k.6 (2025 05:28)    Pertinent Labs:                        9.4    13.00 )-----------( 295      ( 2025 07:16 )             26.8     02    134[L]  |  91[L]  |  36[H]  ----------------------------<  171[H]  3.2[L]   |  26  |  1.89[H]    Ca    9.0      2025 07:03  Phos  2.4     02  Mg     2.0     02        ------------------------------------------------------------------------------------------------------------------  Assessment:  64y.o. Female with PAD, CLTI affecting b/l LE, CHF, chronic b/l LE wound R worsen then the L, with R foot blistering and ulceration was transfer to [facility] for CO2 angiogram. Patient is currently followed by medicine and cardiology.     Plan:  - OR 25 for RLE angiogram, possible angioplasty, possible stent  with Dr. Baltazar   - NPO after midnight, except medications   - IVF while NPO  - Consent signed in chart  - Pre-op Labs at 4:00 am on /: CBC, BMP, Mag, Phos, PT/PTT/INR, T&S   - Cardiac clearance documented on   - Medicine clearance documented on   - Anticoagulation (Lovenox SQ) held       Surgery  p

## 2025-02-23 NOTE — PROGRESS NOTE ADULT - SUBJECTIVE AND OBJECTIVE BOX
MR#[id number]  PATIENT NAME:COLE ALBA    DATE OF SERVICE: 25 @ 06:34  Patient was seen and examined by Yordy Gilmore MD on    25 @ 06:34 .  Interim events noted.Consultant notes ,Labs,Telemetry reviewed by me       HOSPITAL COURSE: HPI:  64F, hx of CHF (last TTE on 25 EF 30-35%, G2DD), DM, diabetic foot ulcer with a recent admission at [facility] for CHF exacerbation presented as a transfer for worsening R. leg pain and fluid secretion, On non-invasive imaging demonstrating severe depletion of RLE blood flow beyond the popliteal vessel at knee and similar findings in L. Was transferred to [facility] for CO2 angiogram with Dr. Baltazar. (2025 20:05)      INTERIM EVENTS:Patient seen at bedside ,interim events noted.  -on  5mcg and Bumex gtt   02/10-OOB still orthopneac [id number] LE weakness and swelling  -c/o LE pain not dyspneac  held on Bumex 3 mg/Hr  -Awake seen by HF   -Remains on Bumex 3mg/Hr for possible LHC/RHC  -Had cath Multivessel CAD started on Milrinone for CTS evaluation albeit targets not optimal  02/15-Awake on Milrinone and Bumex gtt-seen by CTS not a surgical candidate-needs Vascular work-up for LE PAD-scheduled for Angiogram on Wednesday    Weight 170Kg---> 164 Kg--->161.1 Kg---151.2 Kg  -Awake is able to lie flat c/o RLE pain Sinus Rhythm UOP-2750mL,on Milrinone 0.25mcg Bumex 3mg/Hr,Metolazone 10mg  -Awake is able to lie flat remains on Milrinone and Bumex 3mg/Hr UOP-1500mL-  -Awake no chest pain or dyspnea at rest Hypertonic saline stopped Na 150,Bumex held remains on Milrinone gtt Sinus rhythm  UOP-1425mL  -Awake is off diuretics for cMR and CO2 angiogram  -Had cMD Plan for HD catheter placement to initiate Dialysis  -s/p HD catheter had HD with 500mL removal   -Awake c/o LE pain not dyspneac    -Awake Had HD last night 1000mL removed Plan for CO2 angiogram tomorrow      PMH -reviewed admission note, no change since admission  HEART FAILURE: Acute[x ]Chronic[ ] Systolic[x ] Diastolic[ ] Combined Systolic and Diastolic[ ]  CAD[x ] CABG[ ] PCI[ ]  DEVICES[ ] PPM[ ] ICD[ ] ILR[ ]  ATRIAL FIBRILLATION[ ] Paroxysmal[ ] Permanent[ ] CHADS2-[  ]  JAMEL[x ] CKD1[ ] CKD2[ ] CKD3[ ] CKD4[ ] ESRD[ ]  COPD[ ] HTN[x ]   DM[x ] Type1[ ] Type 2[ x]   CVA[ ] Paresis[ ]    AMBULATION: Assisted[x ] Cane/walker[ ] Independent[ ]          MEDICATIONS  (STANDING):  ampicillin/sulbactam  IVPB 3 Gram(s) IV Intermittent every 12 hours  ampicillin/sulbactam  IVPB      aspirin enteric coated 81 milliGRAM(s) Oral daily  atorvastatin 40 milliGRAM(s) Oral at bedtime  benzocaine/menthol Lozenge 1 Lozenge Oral once  bisacodyl 5 milliGRAM(s) Oral every 12 hours  chlorhexidine 4% Liquid 1 Application(s) Topical <User Schedule>  glucagon  Injectable 1 milliGRAM(s) IntraMuscular once  heparin   Injectable 5000 Unit(s) SubCutaneous every 8 hours  insulin glargine Injectable (LANTUS) 9 Unit(s) SubCutaneous at bedtime  insulin lispro (ADMELOG) corrective regimen sliding scale   SubCutaneous three times a day before meals  insulin lispro (ADMELOG) corrective regimen sliding scale   SubCutaneous at bedtime  insulin lispro Injectable (ADMELOG) 2 Unit(s) SubCutaneous three times a day with meals  linagliptin 5 milliGRAM(s) Oral daily  milrinone Infusion 0.25 MICROgram(s)/kG/Min (5.41 mL/Hr) IV Continuous <Continuous>  mupirocin 2% Ointment 1 Application(s) Both Nostrils two times a day  pantoprazole  Injectable 40 milliGRAM(s) IV Push daily  polyethylene glycol 3350 17 Gram(s) Oral daily  senna 2 Tablet(s) Oral at bedtime    MEDICATIONS  (PRN):  acetaminophen     Tablet .. 650 milliGRAM(s) Oral every 6 hours PRN Mild Pain (1 - 3)  dextrose Oral Gel 15 Gram(s) Oral once PRN Blood Glucose LESS THAN 70 milliGRAM(s)/deciliter  HYDROmorphone   Solution 1 milliGRAM(s) Oral every 6 hours PRN Severe Pain (7 - 10)  melatonin 3 milliGRAM(s) Oral at bedtime PRN Insomnia  ondansetron Injectable 4 milliGRAM(s) IV Push every 6 hours PRN Nausea and/or Vomiting  sodium chloride 0.65% Nasal 1 Spray(s) Both Nostrils three times a day PRN Dryness  sodium chloride 0.9% lock flush 10 milliLiter(s) IV Push every 1 hour PRN Pre/post blood products, medications, blood draw, and to maintain line patency            REVIEW OF SYSTEMS:  Constitutional: [ ] fever, [ ]weight loss,  [x ]fatigue [ ]weight gain  Eyes: [ ] visual changes  Respiratory: [ ]shortness of breath;  [ ] cough, [ ]wheezing, [ ]chills, [ ]hemoptysis  Cardiovascular: [ ] chest pain, [ ]palpitations, [ ]dizziness,  [v ]leg swelling[ ]orthopnea[ ]PND  Gastrointestinal: [ ] abdominal pain, [ ]nausea, [ ]vomiting,  [ ]diarrhea [ ]Constipation [ ]Melena  Genitourinary: [ ] dysuria, [ ] hematuria [ ][place]  Neurologic: [ ] headaches [ ] tremors[ ]weakness [ ]Paralysis Right[ ] Left[ ]  Skin: [ ] itching, [ ]burning, [ ] rashes  Endocrine: [ ] heat or cold intolerance  Musculoskeletal: [ ] joint pain or swelling; [ ] muscle, back, or extremity pain  Psychiatric: [ ] depression, [ ]anxiety, [ ]mood swings, or [ ]difficulty sleeping  Hematologic: [ ] easy bruising, [ ] bleeding gums    [ ] All remaining systems negative except as per above.   [ ]Unable to obtain.  [x] No change in ROS since admission      Vital Signs Last 24 Hrs  T(C): 36.9 (2025 04:49), Max: 37.1 (2025 16:16)  T(F): 98.4 (2025 04:49), Max: 98.8 (2025 16:16)  HR: 104 (2025 04:49) (102 - 105)  BP: 108/63 (2025 04:49) (106/59 - 120/68)  RR: 18 (2025 04:49) (16 - 18)  SpO2: 93% (2025 04:49) (93% - 98%)    Parameters below as of 2025 22:05  Patient On (Oxygen Delivery Method): room air      I&O's Summary    2025 07:01  -  2025 07:00  --------------------------------------------------------  IN: 590 mL / OUT: 2350 mL / NET: -1760 mL    2025 07:01  -  2025 06:34  --------------------------------------------------------  IN: 720 mL / OUT: 2830 mL / NET: -2110 mL        PHYSICAL EXAM:  General: No acute distress BMI-24  HEENT: EOMI, PERRL  Neck: Supple, [ ] JVD  Lungs: Equal air entry bilaterally; [ ] rales [ ] wheezing [ ] rhonchi  Heart: Regular rate and rhythm; [x ] murmur   2/6 [ x] systolic [ ] diastolic [ ] radiation[ ] rubs [ ]  gallops  Abdomen: Nontender, bowel sounds present  Extremities: No clubbing, cyanosis, [x ] edema [x ]Bilateral lower extremity venous stasis wounds to dermis, mild serous drainage, no acute signs of infection. Left foot hallux distal tuft well adhered eschar, no acute signs of infection.   Nervous system:  Alert & Oriented X3, no focal deficits  Psychiatric: Normal affect  Skin: No rashes or lesions    LABS:      135  |  91[L]  |  66[H]  ----------------------------<  165[H]  3.3[L]   |  25  |  3.02[H]    Ca    9.1      2025 07:16    TPro  8.5[H]  /  Alb  3.4  /  TBili  1.2  /  DBili  x   /  AST  72[H]  /  ALT  103[H]  /  AlkPhos  171[H]  02-21    Creatinine Trend: 3.02<--, 3.38<--, 4.76<--, 4.07<--, 3.49<--, 2.93<--                        9.4    13.00 )-----------( 295      ( 2025 07:16 )             26.8          TTE Limited W or WO Ultrasound Enhancing Agent (25 @ 12:39) >  CONCLUSIONS:      1. Limited TTE to assess for MR, IVC, LVOT, TR.   2. Left ventricular systolic function is severely decreased.   3. Reduced right ventricular systolic function.   4. LVOT VTI 12.0cm, Stroke volume 35cc. LVOT diameter 1.9cm.   5. Mild to moderate tricuspid regurgitation.   6. Estimated pulmonary artery systolic pressure is 32 mmHg, consistent with normal pulmonary artery pressure.   7. The inferior vena cava is normal in size measuring 1.70 cm in diameter, (normal <2.1cm) with abnormal inspiratory collapse (abnormal <50%) consistent with mildly elevated right atrial pressure (~8, range 5-10mmHg).   8. Compared to the transthoracic echocardiogram performed on 2/10/2025, RV and LV function still .           TTE W or WO Ultrasound Enhancing Agent (02.10.25 @ 12:09) >  CONCLUSIONS:      1. Left ventricular cavity is mildly dilated. Left ventricular systolic function is severely decreased with an ejection fraction of 20 % by Winchester's method of disks. Regional wall motion abnormalities present.   2. Multiple segmental abnormalities exist. See findings.   The entire septum, entire apex, entire inferior wall, mid inferolateral segment, and mid anterolateral segment are hypokinetic. All remaining scored segments are normal.     3. Reduced right ventricular systolic function.   4. No pericardial effusion seen.   5. Compared to the transthoracic echocardiogram performed on 2025, Worsening of the left ventricular function.        VA Duplex Lower Ext Vein Scan, Bilat (02.10.25 @ 17:42) >  IMPRESSION: No evidence of bilateral DVT within the imaged veins.        Nuclear Stress Test-Pharmacologic.. (25 @ 10:31) >  Conclusions:   1. Myocardial Perfusion: Abnormal.   2. Qualitative Perfusion:      - small-sized, moderate defect(s) in the apical wall that is predominantly fixed suggestive of an infarction with minimal marcus-infarct ischemia.   3. The post stress left ventricular EF is 25 %. The stress end diastolic volume is 118 ml.   4. Results D/Wcardiologist Dr. Gilmore at 18:31        RHC: RA 20, PA 49/29 (40), PCWP 35, mVO2 51.1, CO/CI 3.8/2.2, SVR 1095  LHC: RCA , severe ostial LM disease, diffuse disease in LAD and LCx, some L to R collaterals        MR Cardiac w/wo IV Cont (25 @ 09:44) >  IMPRESSION:.    The left ventricle demonstrates severe wall motion abnormalities and  function is depressed (calculated LVEF is 25%).    There is greater than 75% subendocardial scarring involving the basal to  mid inferior wall of the left ventricle.    There is left ventricular transmural scarring involving the apical septal  wall and likely near transmural scarring of the apical lateral wall.    There is about 50% scarring of the left ventricular basal inferoseptal  wall.

## 2025-02-24 LAB
ANION GAP SERPL CALC-SCNC: 15 MMOL/L — SIGNIFICANT CHANGE UP (ref 5–17)
APTT BLD: 31 SEC — SIGNIFICANT CHANGE UP (ref 24.5–35.6)
BLD GP AB SCN SERPL QL: NEGATIVE — SIGNIFICANT CHANGE UP
BUN SERPL-MCNC: 41 MG/DL — HIGH (ref 7–23)
CALCIUM SERPL-MCNC: 9.1 MG/DL — SIGNIFICANT CHANGE UP (ref 8.4–10.5)
CHLORIDE SERPL-SCNC: 94 MMOL/L — LOW (ref 96–108)
CO2 SERPL-SCNC: 26 MMOL/L — SIGNIFICANT CHANGE UP (ref 22–31)
CREAT SERPL-MCNC: 2.12 MG/DL — HIGH (ref 0.5–1.3)
EGFR: 26 ML/MIN/1.73M2 — LOW
EGFR: 26 ML/MIN/1.73M2 — LOW
GLUCOSE BLDC GLUCOMTR-MCNC: 139 MG/DL — HIGH (ref 70–99)
GLUCOSE BLDC GLUCOMTR-MCNC: 232 MG/DL — HIGH (ref 70–99)
GLUCOSE BLDC GLUCOMTR-MCNC: 353 MG/DL — HIGH (ref 70–99)
GLUCOSE SERPL-MCNC: 151 MG/DL — HIGH (ref 70–99)
HCT VFR BLD CALC: 26.8 % — LOW (ref 34.5–45)
HGB BLD-MCNC: 9.3 G/DL — LOW (ref 11.5–15.5)
INR BLD: 1.29 RATIO — HIGH (ref 0.85–1.16)
MAGNESIUM SERPL-MCNC: 1.9 MG/DL — SIGNIFICANT CHANGE UP (ref 1.6–2.6)
MCHC RBC-ENTMCNC: 25.5 PG — LOW (ref 27–34)
MCHC RBC-ENTMCNC: 34.7 G/DL — SIGNIFICANT CHANGE UP (ref 32–36)
MCV RBC AUTO: 73.6 FL — LOW (ref 80–100)
NRBC BLD AUTO-RTO: 0 /100 WBCS — SIGNIFICANT CHANGE UP (ref 0–0)
PHOSPHATE SERPL-MCNC: 2.5 MG/DL — SIGNIFICANT CHANGE UP (ref 2.5–4.5)
PLATELET # BLD AUTO: 269 K/UL — SIGNIFICANT CHANGE UP (ref 150–400)
POTASSIUM SERPL-MCNC: 4 MMOL/L — SIGNIFICANT CHANGE UP (ref 3.5–5.3)
POTASSIUM SERPL-SCNC: 4 MMOL/L — SIGNIFICANT CHANGE UP (ref 3.5–5.3)
PROTHROM AB SERPL-ACNC: 14.8 SEC — HIGH (ref 9.9–13.4)
RBC # BLD: 3.64 M/UL — LOW (ref 3.8–5.2)
RBC # FLD: 20.7 % — HIGH (ref 10.3–14.5)
RH IG SCN BLD-IMP: NEGATIVE — SIGNIFICANT CHANGE UP
SODIUM SERPL-SCNC: 135 MMOL/L — SIGNIFICANT CHANGE UP (ref 135–145)
WBC # BLD: 12.33 K/UL — HIGH (ref 3.8–10.5)
WBC # FLD AUTO: 12.33 K/UL — HIGH (ref 3.8–10.5)

## 2025-02-24 PROCEDURE — 75710 ARTERY X-RAYS ARM/LEG: CPT | Mod: 26,RT,59

## 2025-02-24 PROCEDURE — 37224: CPT | Mod: RT

## 2025-02-24 PROCEDURE — 93010 ELECTROCARDIOGRAM REPORT: CPT

## 2025-02-24 PROCEDURE — 76937 US GUIDE VASCULAR ACCESS: CPT | Mod: 26

## 2025-02-24 PROCEDURE — 37228: CPT | Mod: RT

## 2025-02-24 RX ORDER — CLOPIDOGREL BISULFATE 75 MG/1
TABLET, FILM COATED ORAL
Refills: 0 | Status: DISCONTINUED | OUTPATIENT
Start: 2025-02-24 | End: 2025-03-17

## 2025-02-24 RX ORDER — INSULIN GLARGINE-YFGN 100 [IU]/ML
9 INJECTION, SOLUTION SUBCUTANEOUS AT BEDTIME
Refills: 0 | Status: DISCONTINUED | OUTPATIENT
Start: 2025-02-24 | End: 2025-02-25

## 2025-02-24 RX ORDER — CLOPIDOGREL BISULFATE 75 MG/1
75 TABLET, FILM COATED ORAL DAILY
Refills: 0 | Status: DISCONTINUED | OUTPATIENT
Start: 2025-02-25 | End: 2025-03-17

## 2025-02-24 RX ORDER — CLOPIDOGREL BISULFATE 75 MG/1
300 TABLET, FILM COATED ORAL ONCE
Refills: 0 | Status: COMPLETED | OUTPATIENT
Start: 2025-02-24 | End: 2025-02-24

## 2025-02-24 RX ADMIN — INSULIN LISPRO 2: 100 INJECTION, SOLUTION INTRAVENOUS; SUBCUTANEOUS at 17:20

## 2025-02-24 RX ADMIN — Medication 1 MILLIGRAM(S): at 05:18

## 2025-02-24 RX ADMIN — AMPICILLIN SODIUM AND SULBACTAM SODIUM 200 GRAM(S): 1; .5 INJECTION, POWDER, FOR SOLUTION INTRAMUSCULAR; INTRAVENOUS at 13:08

## 2025-02-24 RX ADMIN — HEPARIN SODIUM 5000 UNIT(S): 1000 INJECTION INTRAVENOUS; SUBCUTANEOUS at 05:18

## 2025-02-24 RX ADMIN — Medication 5 MILLIGRAM(S): at 05:18

## 2025-02-24 RX ADMIN — ISOSORBDIE DINITRATE 5 MILLIGRAM(S): 30 TABLET ORAL at 08:40

## 2025-02-24 RX ADMIN — Medication 1 MILLIGRAM(S): at 16:16

## 2025-02-24 RX ADMIN — ATORVASTATIN CALCIUM 40 MILLIGRAM(S): 80 TABLET, FILM COATED ORAL at 21:39

## 2025-02-24 RX ADMIN — Medication 1 APPLICATION(S): at 08:13

## 2025-02-24 RX ADMIN — Medication 40 MILLIGRAM(S): at 16:15

## 2025-02-24 RX ADMIN — INSULIN LISPRO 3: 100 INJECTION, SOLUTION INTRAVENOUS; SUBCUTANEOUS at 21:37

## 2025-02-24 RX ADMIN — Medication 81 MILLIGRAM(S): at 13:08

## 2025-02-24 RX ADMIN — Medication 1 MILLIGRAM(S): at 17:16

## 2025-02-24 RX ADMIN — MUPIROCIN CALCIUM 1 APPLICATION(S): 20 CREAM TOPICAL at 05:19

## 2025-02-24 RX ADMIN — INSULIN GLARGINE-YFGN 9 UNIT(S): 100 INJECTION, SOLUTION SUBCUTANEOUS at 21:42

## 2025-02-24 RX ADMIN — CLOPIDOGREL BISULFATE 300 MILLIGRAM(S): 75 TABLET, FILM COATED ORAL at 13:08

## 2025-02-24 RX ADMIN — Medication 1 MILLIGRAM(S): at 23:07

## 2025-02-24 RX ADMIN — AMPICILLIN SODIUM AND SULBACTAM SODIUM 200 GRAM(S): 1; .5 INJECTION, POWDER, FOR SOLUTION INTRAMUSCULAR; INTRAVENOUS at 21:40

## 2025-02-24 RX ADMIN — LINAGLIPTIN 5 MILLIGRAM(S): 5 TABLET, FILM COATED ORAL at 16:15

## 2025-02-24 RX ADMIN — HEPARIN SODIUM 5000 UNIT(S): 1000 INJECTION INTRAVENOUS; SUBCUTANEOUS at 21:39

## 2025-02-24 NOTE — PROGRESS NOTE ADULT - SUBJECTIVE AND OBJECTIVE BOX
Patient seen and examined at bedside.    Overnight Events: 1.4L urine output    REVIEW OF SYSTEMS:  CONSTITUTIONAL: No weakness, fevers or chills  EYES/ENT: No visual changes;  No dysphagia  NECK: No pain or stiffness  RESPIRATORY: No cough, wheezing, hemoptysis; No shortness of breath  CARDIOVASCULAR: No chest pain or palpitations; No lower extremity edema  GASTROINTESTINAL: No abdominal or epigastric pain. No nausea, vomiting, or hematemesis; No diarrhea or constipation. No melena or hematochezia.  BACK: No back pain  GENITOURINARY: No dysuria, frequency or hematuria  NEUROLOGICAL: No numbness or weakness  SKIN: No itching, burning, rashes, or lesions   All other review of systems is negative unless indicated above.            Current Meds:  acetaminophen     Tablet .. 650 milliGRAM(s) Oral every 6 hours PRN  ampicillin/sulbactam  IVPB 3 Gram(s) IV Intermittent every 12 hours  ampicillin/sulbactam  IVPB      aspirin enteric coated 81 milliGRAM(s) Oral daily  atorvastatin 40 milliGRAM(s) Oral at bedtime  benzocaine/menthol Lozenge 1 Lozenge Oral once  bisacodyl 5 milliGRAM(s) Oral every 12 hours  chlorhexidine 4% Liquid 1 Application(s) Topical <User Schedule>  dextrose 5%. 1000 milliLiter(s) IV Continuous <Continuous>  dextrose 5%. 1000 milliLiter(s) IV Continuous <Continuous>  dextrose 50% Injectable 25 Gram(s) IV Push once  dextrose 50% Injectable 12.5 Gram(s) IV Push once  dextrose 50% Injectable 25 Gram(s) IV Push once  dextrose Oral Gel 15 Gram(s) Oral once PRN  glucagon  Injectable 1 milliGRAM(s) IntraMuscular once  heparin   Injectable 5000 Unit(s) SubCutaneous every 8 hours  hydrALAZINE 10 milliGRAM(s) Oral three times a day  HYDROmorphone  Injectable 1 milliGRAM(s) IV Push every 6 hours PRN  insulin glargine Injectable (LANTUS) 7 Unit(s) SubCutaneous at bedtime  insulin lispro (ADMELOG) corrective regimen sliding scale   SubCutaneous three times a day before meals  insulin lispro (ADMELOG) corrective regimen sliding scale   SubCutaneous at bedtime  insulin lispro Injectable (ADMELOG) 2 Unit(s) SubCutaneous three times a day with meals  isosorbide   dinitrate Tablet (ISORDIL) 5 milliGRAM(s) Oral three times a day  linagliptin 5 milliGRAM(s) Oral daily  melatonin 3 milliGRAM(s) Oral at bedtime PRN  milrinone Infusion 0.25 MICROgram(s)/kG/Min IV Continuous <Continuous>  ondansetron Injectable 4 milliGRAM(s) IV Push every 6 hours PRN  pantoprazole  Injectable 40 milliGRAM(s) IV Push daily  polyethylene glycol 3350 17 Gram(s) Oral daily  senna 2 Tablet(s) Oral at bedtime  sodium chloride 0.65% Nasal 1 Spray(s) Both Nostrils three times a day PRN  sodium chloride 0.9% lock flush 10 milliLiter(s) IV Push every 1 hour PRN      Vitals:  T(F): 98.6 (02-24), Max: 99.3 (02-24)  HR: 101 (02-24) (101 - 106)  BP: 110/64 (02-24) (97/64 - 110/64)  RR: 18 (02-24)  SpO2: 96% (02-24)  I&O's Summary    23 Feb 2025 07:01  -  24 Feb 2025 07:00  --------------------------------------------------------  IN: 340 mL / OUT: 1400 mL / NET: -1060 mL        Physical Exam:  GEN: NAD  HEENT: EOMI, clear sclera  PULM: CTA b/l, no wheeze  CV: RRR S1 S2, no murmur, no JVD  ABD: S, NT, ND  EXT: bandaged                          9.3    12.33 )-----------( 269      ( 24 Feb 2025 04:20 )             26.8     02-24    135  |  94[L]  |  41[H]  ----------------------------<  151[H]  4.0   |  26  |  2.12[H]    Ca    9.1      24 Feb 2025 04:20  Phos  2.5     02-24  Mg     1.9     02-24      PT/INR - ( 24 Feb 2025 04:21 )   PT: 14.8 sec;   INR: 1.29 ratio         PTT - ( 24 Feb 2025 04:21 )  PTT:31.0 sec

## 2025-02-24 NOTE — PROGRESS NOTE ADULT - SUBJECTIVE AND OBJECTIVE BOX
[facility] NEPHROLOGY- PROGRESS NOTE    64y Female with history of CHF presents as a transfer for LE CO2 angiogram. Nephrology consulted for elevated Scr.    REVIEW OF SYSTEMS:  Gen: no fevers  Cards: no chest pain  Resp: no dyspnea  GI: no nausea or vomiting or diarrhea  Vascular: + LE edema    Augmentin (Stomach Upset; Vomiting; Nausea)  No Known Allergies      Hospital Medications: Medications reviewed    VITALS:  T(F): 98.6 (02-24-25 @ 04:05), Max: 99.3 (02-24-25 @ 00:02)  HR: 102 (02-24-25 @ 08:35)  BP: 114/75 (02-24-25 @ 08:35)  RR: 18 (02-24-25 @ 04:05)  SpO2: 96% (02-24-25 @ 04:05)  Wt(kg): --  Height (cm): 162.6 (02-13 @ 13:28)  Weight (kg): 72.1 (02-13 @ 13:28)  BMI (kg/m2): 27.3 (02-13 @ 13:28)  BSA (m2): 1.77 (02-13 @ 13:28)    02-23 @ 07:01  -  02-24 @ 07:00  --------------------------------------------------------  IN: 340 mL / OUT: 1400 mL / NET: -1060 mL        PHYSICAL EXAM:    Gen: NAD, calm  Cards: RRR, +S1/S2, no M/G/R  Resp: CTA B/L  GI: soft, NT/ND, NABS  : + stiles  Vascular: + LE wrapped B/L with trace edema, + RIJ subclavian    LABS:  02-24    135  |  94[L]  |  41[H]  ----------------------------<  151[H]  4.0   |  26  |  2.12[H]    Ca    9.1      24 Feb 2025 04:20  Phos  2.5     02-24  Mg     1.9     02-24      Creatinine Trend: 2.12 <--, 1.89 <--, 3.02 <--, 3.38 <--, 4.76 <--, 4.07 <--, 3.49 <--                        9.3    12.33 )-----------( 269      ( 24 Feb 2025 04:20 )             26.8     Urine Studies:  Urinalysis Basic - ( 24 Feb 2025 04:20 )    Color:  / Appearance:  / SG:  / pH:   Gluc: 151 mg/dL / Ketone:   / Bili:  / Urobili:    Blood:  / Protein:  / Nitrite:    Leuk Esterase:  / RBC:  / WBC    Sq Epi:  / Non Sq Epi:  / Bacteria:           RADIOLOGY & ADDITIONAL STUDIES:

## 2025-02-24 NOTE — PROGRESS NOTE ADULT - ASSESSMENT
65 YO F with PMHx of HFrEF 30-35 and grade 2 ddfxn (last TTE on 01/16/25), DM2, and diabetic foot ulcer. Recent admission at [facility] for ADHF. Patient now represents to OSH and ultimately to [facility] as a transfer for worsening right leg pain and fluid secretion. As per documentation, patient was reported to have severe depletion of RLE blood flow beyond the popliteal vessel at knee and similar findings in the left. Patient was transferred to [facility] for CO2 angiogram with Dr. Baltazar. Of note, patient also with reported visual disturbance for which patient was seen and evaluated by opthalmology. Internal Medicine has been consulted on Ms. Kirby's care for medical management.     # ADHF/ BiV HFrEF 20   - Recent admission at [facility] for ADHF and on dobutamine outpatient  - TTE 1/16 with EF 30-35 with moderately reduced LVSF and grade 2 ddfxn, normal RVSF , trace TR/ TX, and trace pericardial effusion  - NST abnormal with small-sized, moderate defect in apical wall that is predominantly fixed suggestive of an infarction with minimal marcus-infarct ischemia. Post stress LVEF 25.  - CT Chest with small BL pleural effusions and small pericardial effusion.  - RPT TTE with EF 20, severely decreased LV, decreased RVSF with TAPSE 1.2, and regional wall motion abnormalities present with entire septum, entire apex, entire inferior wall, mid inferolateral segment, and mid anterolateral segment are hypokinetic.   - RHC with RA 20, PA 49/29 (40), PCWP 35, mVO2 51.1, CO/CI 3.8/2.2, SVR 1095 w/ Multivessel CAD   - s/p zaroxyln 10 QD  - Continue on milrinone gtt as per Cardio   - CRE and sodium rising and bumex GTT stopped 2/18 and HTS stopped 2/16   - RPT limited TTE w/ LVSF  severely decreased, Reduced RVSF, LVOT VTI 12.0cm, Mild to mod TR, mildly elevated right atrial pressure  - HF following and adjusting medications   - Case discussed with EP and needs to be on GDMT for 3 months before AICD placement and should be stabilized off of inotropic support.   - Monitor I and O, diuresis per Cardio/ Renal    - GDMT as per Cardio -> on Hydralazine, Isosorbide   - Serial CXR   - Monitor volume status   - Check daily weights    - Monitor on telemetry; Monitor electrolytes   - Cardio, HF, Renal, and EP evals appreciated; F/u recs     # CAD with TVD   - [facility] with RCA , severe ostial LM disease, diffuse disease in LAD and LCx, some L to R collaterals (Multivessel CAD)  - Case discussed with CTSx and NOT a candidate for CABG due to comorbidities  - Cardiac MRI done F/u Cardio recs   - AS and Statin   - Cardiology following --> F/u for high risk PCI planning    # BL LE Edema likely in setting of ADHF  - Diuresis as per Cardio, HF and Renal   - BL LE elevation and compression  - LE Duplex neg   - Monitor for now  - Podiatry and Vascular evals appreciated; F/u recs --> Planned for CO2 angio w/ vascular today     # Pericardial effusion likely in setting ADHF  - TTE with trace pericardial effusion   - CT Chest with small pericardial effusion   - Denies chest pain, palpitations, chest tightness or discomfort, shortness of breath or dyspnea   - Repeat TTE in 1 month for further evaluation   - Diuresis per cardio/ renal.  - Monitor on telemetry  - Cardio following    # Pleural effusion likely in setting ADHF  - CT Chest with small BL pleural effusions  - On RA with no respiratory complaints at present  - Monitor O2 saturation  - Supplement to maintain > 90%   - Diuresis per cardio/ renal.     # JAMEL with Hyponatremia and Metabolic Acidosis   - Baseline CRE 0.7-1.2 and increased to ~4  - As per OSH documentation, JAMEL was thought to be second to Unasyn/ Bumex / Entresto use and Hypotension   - US RENAL with no hydronephrosis  - Likely cardiorenal induced with low flow state in ADHF, however now rising again and concern for milrinone vs overdiuresis (prerenal) vs cardiorenal.   - Milrinone adjusted as above and diuresis remains off per HF .   - Remains nonoliguric, S/P Shiley 2/20 w/ IR and initiation of HD . HD per renal   - HF following and adjusting medications.    - Continue with HF support as above  - Avoid nephrotoxic agents  - Monitor Cr and daily BMP   - Renal eval appreciated    # Transaminitis  - Likely 2/2 hepatic congestion   - Volume removal with HD as tolerated  - Trend LFTs, if uptrend post-HD initiation, check ABD US  - Serial ABD Examinations    # Hypernatremia   - Hypernatremia likely from HTS vs over diuresis/ intravascular dehydration   - Hold further HTS   - Sodium improved   - Monitor sodium     # Leukocytosis likely in setting of BL LE ulcers with pop occlusion   - BCx negative   - UCx with probable contamination   - On unasyn for ABX. Frequency increased to Q12 as per Renal   - Leukocytosis fluctuating, however appears nontoxic with no fever spikes and monitoring closely on below unasyn.   - If febrile check pan cultures   - Trend CBC, temp curve, VS and adjust as tolerated    # Foot ulcers with worsening RLE pain second to pop artery occlusion +/- diabetic ulcers   - RLE Arterial Duplex with popliteal artery occlusion   - LLE Arterial Duplex with underlying disease cannot be excluded   - Patient transferred to [facility] for CO2 angiogram, however given ADHF currently not medically optimized and high risk. Plan to continue on diuresis and inotropic support as above   - Continue on Lipitor  - Was on Heparin GTT for now and now on HSQ  - Continue pain control with oxy  - Wound care called for dressing recs   - Vascular following, plan for CO2 angio     # Tachycardia likely pain related   - On Toprol and improved  - Continue to monitor on tele   - Cardio eval appreciated    # Visual Disturbance  - CT Head w/ old infarcts  - On ASA and Statin   - Opthalmology eval appreciated    # Indigestion and Constipation   - PPI, miralax and senna continued  - Monitor BMs    # Anemia likely mixed AOCD vs iron deficiency   - HH stable in 9s on HSQ  - Consider iron tabs when optimized   - Monitor HH   - Transfuse for Hgb < 8  - Maintain active T/S    # Diabetes Mellitus A1C 11.6   - Continue on lantus 10 with tradjecta 5 and ISS   - Diabetic DASH diet   - Monitor and adjust glucose levels PRN   - Endocrine following; F/u recs     # HLD and HLD  - Continue on lipitor and toprol  - Monitor BP    # DISPO TBD  - Palliative care eval appreciated; F/u recs --> FULL CODE       Discussed with Attending and ACP

## 2025-02-24 NOTE — PROGRESS NOTE ADULT - SUBJECTIVE AND OBJECTIVE BOX
MR#[id number]  PATIENT NAME:COLE ALBA    DATE OF SERVICE: 25 @ 06:58  Patient was seen and examined by Yordy Gilmore MD on    25 @ 06:58 .  Interim events noted.Consultant notes ,Labs,Telemetry reviewed by me       HOSPITAL COURSE: HPI:  64F, hx of CHF (last TTE on 25 EF 30-35%, G2DD), DM, diabetic foot ulcer with a recent admission at [facility] for CHF exacerbation presented as a transfer for worsening R. leg pain and fluid secretion, On non-invasive imaging demonstrating severe depletion of RLE blood flow beyond the popliteal vessel at knee and similar findings in L. Was transferred to [facility] for CO2 angiogram with Dr. Baltazar. (2025 20:05)      INTERIM EVENTS:Patient seen at bedside ,interim events noted.  -Had cath Multivessel CAD started on Milrinone for CTS evaluation albeit targets not optimal  02/15-Awake on Milrinone and Bumex gtt-seen by CTS not a surgical candidate-needs Vascular work-up for LE PAD-scheduled for Angiogram on Wednesday    Weight 170Kg---> 164 Kg--->161.1 Kg---151.2 ---> 155  Kg  -Awake is off diuretics for cMR and CO2 angiogram  -Had cMD Plan for HD catheter placement to initiate Dialysis  -s/p HD catheter had HD with 500mL removal   -Awake c/o LE pain not dyspneac  -Awake Had HD last night 1000mL removed Plan for CO2 angiogram tomorrow    -Awake Plan for CO2 Angiogram today no dyspnea       PMH -reviewed admission note, no change since admission  HEART FAILURE: Acute[x ]Chronic[ ] Systolic[x ] Diastolic[ ] Combined Systolic and Diastolic[ ]  CAD[x ] CABG[ ] PCI[ ]  DEVICES[ ] PPM[ ] ICD[ ] ILR[ ]  ATRIAL FIBRILLATION[ ] Paroxysmal[ ] Permanent[ ] CHADS2-[  ]  JAMEL[x ] CKD1[ ] CKD2[ ] CKD3[ ] CKD4[ ] ESRD[ ]  COPD[ ] HTN[x ]   DM[x ] Type1[ ] Type 2[ x]   CVA[ ] Paresis[ ]    AMBULATION: Assisted[x ] Cane/walker[ ] Independent[ ]        MEDICATIONS  (STANDING):  ampicillin/sulbactam  IVPB 3 Gram(s) IV Intermittent every 12 hours  ampicillin/sulbactam  IVPB      aspirin enteric coated 81 milliGRAM(s) Oral daily  atorvastatin 40 milliGRAM(s) Oral at bedtime  benzocaine/menthol Lozenge 1 Lozenge Oral once  bisacodyl 5 milliGRAM(s) Oral every 12 hours  chlorhexidine 4% Liquid 1 Application(s) Topical <User Schedule>  glucagon  Injectable 1 milliGRAM(s) IntraMuscular once  heparin   Injectable 5000 Unit(s) SubCutaneous every 8 hours  hydrALAZINE 10 milliGRAM(s) Oral three times a day  insulin glargine Injectable (LANTUS) 7 Unit(s) SubCutaneous at bedtime  insulin lispro (ADMELOG) corrective regimen sliding scale   SubCutaneous three times a day before meals  insulin lispro (ADMELOG) corrective regimen sliding scale   SubCutaneous at bedtime  insulin lispro Injectable (ADMELOG) 2 Unit(s) SubCutaneous three times a day with meals  isosorbide   dinitrate Tablet (ISORDIL) 5 milliGRAM(s) Oral three times a day  linagliptin 5 milliGRAM(s) Oral daily  milrinone Infusion 0.25 MICROgram(s)/kG/Min (5.41 mL/Hr) IV Continuous <Continuous>  pantoprazole  Injectable 40 milliGRAM(s) IV Push daily  polyethylene glycol 3350 17 Gram(s) Oral daily  senna 2 Tablet(s) Oral at bedtime    MEDICATIONS  (PRN):  acetaminophen     Tablet .. 650 milliGRAM(s) Oral every 6 hours PRN Mild Pain (1 - 3)  dextrose Oral Gel 15 Gram(s) Oral once PRN Blood Glucose LESS THAN 70 milliGRAM(s)/deciliter  HYDROmorphone  Injectable 1 milliGRAM(s) IV Push every 6 hours PRN Severe Pain (7 - 10)  melatonin 3 milliGRAM(s) Oral at bedtime PRN Insomnia  ondansetron Injectable 4 milliGRAM(s) IV Push every 6 hours PRN Nausea and/or Vomiting  sodium chloride 0.65% Nasal 1 Spray(s) Both Nostrils three times a day PRN Dryness  sodium chloride 0.9% lock flush 10 milliLiter(s) IV Push every 1 hour PRN Pre/post blood products, medications, blood draw, and to maintain line patency            REVIEW OF SYSTEMS:  Constitutional: [ ] fever, [ ]weight loss,  [ x]fatigue [ ]weight gain  Eyes: [ ] visual changes  Respiratory: [ ]shortness of breath;  [ ] cough, [ ]wheezing, [ ]chills, [ ]hemoptysis  Cardiovascular: [ ] chest pain, [ ]palpitations, [ ]dizziness,  [x ]leg swelling[ ]orthopnea[ ]PND  Gastrointestinal: [ ] abdominal pain, [ ]nausea, [ ]vomiting,  [ ]diarrhea [ ]Constipation [ ]Melena  Genitourinary: [ ] dysuria, [ ] hematuria [ ][place]  Neurologic: [ ] headaches [ ] tremors[ ]weakness [ ]Paralysis Right[ ] Left[ ]  Skin: [ ] itching, [ ]burning, [ ] rashes  Endocrine: [ ] heat or cold intolerance  Musculoskeletal: [ ] joint pain or swelling; [ ] muscle, back, or extremity pain  Psychiatric: [ ] depression, [ ]anxiety, [ ]mood swings, or [ ]difficulty sleeping  Hematologic: [ ] easy bruising, [ ] bleeding gums    [ ] All remaining systems negative except as per above.   [ ]Unable to obtain.  [x] No change in ROS since admission      Vital Signs Last 24 Hrs  T(C): 37 (2025 04:05), Max: 37.4 (2025 00:02)  T(F): 98.6 (2025 04:05), Max: 99.3 (2025 00:02)  HR: 101 (2025 04:05) (101 - 106)  BP: 110/64 (2025 04:05) (97/64 - 110/64)  RR: 18 (2025 04:05) (18 - 18)  SpO2: 96% (2025 04:05) (93% - 96%)    Parameters below as of 2025 04:05  Patient On (Oxygen Delivery Method): room air      I&O's Summary    2025 07:01  -  2025 07:00  --------------------------------------------------------  IN: 720 mL / OUT: 2830 mL / NET: -2110 mL    2025 07:01  -  2025 06:58  --------------------------------------------------------  IN: 340 mL / OUT: 1400 mL / NET: -1060 mL        PHYSICAL EXAM:  General: No acute distress BMI-24  HEENT: EOMI, PERRL  Neck: Supple, [ ] JVD  Lungs: Equal air entry bilaterally; [ ] rales [ ] wheezing [ ] rhonchi  Heart: Regular rate and rhythm; [x ] murmur   2/6 [ x] systolic [ ] diastolic [ ] radiation[ ] rubs [ ]  gallops  Abdomen: Nontender, bowel sounds present  Extremities: No clubbing, cyanosis, [ x] edema [x ] RLE: Dressings in place, blistering and ulceration on the dorsal aspect of the foot        LLE: First digit dry gangrene on the plantar aspec  Nervous system:  Alert & Oriented X3, no focal deficits  Psychiatric: Normal affect  Skin: No rashes or lesions    LABS:      135  |  94[L]  |  41[H]  ----------------------------<  151[H]  4.0   |  26  |  2.12[H]    Ca    9.1      2025 04:20  Phos  2.5       Mg     1.9           Creatinine Trend: 2.12<--, 1.89<--, 3.02<--, 3.38<--, 4.76<--, 4.07<--                        9.3    12.33 )-----------( 269      ( 2025 04:20 )             26.8     PT/INR - ( 2025 04:21 )   PT: 14.8 sec;   INR: 1.29 ratio         PTT - ( 2025 04:21 )  PTT:31.0 sec         TTE Limited W or WO Ultrasound Enhancing Agent (25 @ 12:39) >  CONCLUSIONS:      1. Limited TTE to assess for MR, IVC, LVOT, TR.   2. Left ventricular systolic function is severely decreased.   3. Reduced right ventricular systolic function.   4. LVOT VTI 12.0cm, Stroke volume 35cc. LVOT diameter 1.9cm.   5. Mild to moderate tricuspid regurgitation.   6. Estimated pulmonary artery systolic pressure is 32 mmHg, consistent with normal pulmonary artery pressure.   7. The inferior vena cava is normal in size measuring 1.70 cm in diameter, (normal <2.1cm) with abnormal inspiratory collapse (abnormal <50%) consistent with mildly elevated right atrial pressure (~8, range 5-10mmHg).   8. Compared to the transthoracic echocardiogram performed on 2/10/2025, RV and LV function still .           TTE W or WO Ultrasound Enhancing Agent (02.10.25 @ 12:09) >  CONCLUSIONS:      1. Left ventricular cavity is mildly dilated. Left ventricular systolic function is severely decreased with an ejection fraction of 20 % by Winchester's method of disks. Regional wall motion abnormalities present.   2. Multiple segmental abnormalities exist. See findings.   The entire septum, entire apex, entire inferior wall, mid inferolateral segment, and mid anterolateral segment are hypokinetic. All remaining scored segments are normal.     3. Reduced right ventricular systolic function.   4. No pericardial effusion seen.   5. Compared to the transthoracic echocardiogram performed on 2025, Worsening of the left ventricular function.        VA Duplex Lower Ext Vein Scan, Bilat (02.10.25 @ 17:42) >  IMPRESSION: No evidence of bilateral DVT within the imaged veins.        Nuclear Stress Test-Pharmacologic.. (25 @ 10:31) >  Conclusions:   1. Myocardial Perfusion: Abnormal.   2. Qualitative Perfusion:      - small-sized, moderate defect(s) in the apical wall that is predominantly fixed suggestive of an infarction with minimal marcus-infarct ischemia.   3. The post stress left ventricular EF is 25 %. The stress end diastolic volume is 118 ml.   4. Results D/Wcardiologist Dr. Gilmore at 18:31        RHC: RA 20, PA 49/29 (40), PCWP 35, mVO2 51.1, CO/CI 3.8/2.2, SVR 1095  LHC: RCA , severe ostial LM disease, diffuse disease in LAD and LCx, some L to R collaterals        MR Cardiac w/wo IV Cont (25 @ 09:44) >  IMPRESSION:.    The left ventricle demonstrates severe wall motion abnormalities and  function is depressed (calculated LVEF is 25%).    There is greater than 75% subendocardial scarring involving the basal to  mid inferior wall of the left ventricle.    There is left ventricular transmural scarring involving the apical septal  wall and likely near transmural scarring of the apical lateral wall.    There is about 50% scarring of the left ventricular basal inferoseptal  wall.

## 2025-02-24 NOTE — PROGRESS NOTE ADULT - ASSESSMENT
64y Female with history of CHF presents as a transfer for LE CO2 angiogram. Nephrology consulted for elevated Scr.    1) JAMEL: likely due to ATN and CRS for which patient initiated on RRT on 2/20 with last HD on 2/22 tolerated well with 1L removed. No need for RRT today. Will assess for additional HD on 2/25. Monitor for renal recovery given planned LE angiogram today. On milrinone gtt. Avoid nephrotoxins.    2) HTN: BP low normal. Continue with current medications.    3) LE edema: Improving. UF with subsequent HD. Eventual diuretics. TTE with severely decreased LVSF. Monitor UO.     4) PAD: For LE CO2 angiogram today. As per vascular surgery.      [facility] NEPHROLOGY  Raji Waterman M.D.  Mars Feliz D.O.  Kelley Parks M.D.  MD Nancy Kulkarni, MSN, ANP-C    Telephone: [phone]  Facsimile: [phone]    [address] [place] [address], [address]  [place] [address]   64y Female with history of CHF presents as a transfer for LE CO2 angiogram. Nephrology consulted for elevated Scr.    1) JAMEL: likely due to ATN and CRS for which patient initiated on RRT on 2/20 with last HD on 2/22 tolerated well with 1L removed. No need for RRT today. Will assess for additional HD on 2/25. Monitor for renal recovery given planned LE angiogram today. On milrinone gtt. Avoid nephrotoxins.    2) HTN: BP low normal. Continue with current medications.    3) LE edema: Improving. UF with subsequent HD. Eventual diuretics. TTE with severely decreased LVSF. Monitor UO.     4) Anemia: Hb low normal. Check AM iron stores. Will consider Epogen pending results.       [facility] NEPHROLOGY  Raji Waterman M.D.  Mars Feliz D.O.  Kelley Parks M.D.  MD Nancy Kulkarni, MSN, ANP-C    Telephone: [phone]  Facsimile: [phone] [place] [address], [address]  [place] [address]

## 2025-02-24 NOTE — CHART NOTE - NSCHARTNOTEFT_GEN_A_CORE
NUTRITION FOLLOW UP NOTE    PATIENT SEEN FOR: nutrition follow up     SOURCE: [x] Patient  [x] Current Medical Record  [] RN  [x] Family/support person at bedside  [] Patient unavailable/inappropriate  [] Other:    CHART REVIEWED/EVENTS NOTED.  [] No changes to nutrition care plan to note  [x] Nutrition Status:  per chart:   - heart failure, milrinone gtt  - BL LE Edema likely in setting of ADHF, plan CO2 angiogram per chart  - Foot ulcers with worsening RLE pain second to pop artery occlusion +/- diabetic ulcers   - JAMEL with Hyponatremia and Metabolic Acidosis   - initiated on HD with last HD on  per chart       DIET ORDER:   Diet, Regular:   Consistent Carbohydrate {No Snacks} (CSTCHO)  Low Sodium  No Concentrated Phosphorus  Halal  Lacto Veg (Accepts Milk & Milk Products) (25)      CURRENT DIET ORDER IS:  [] Appropriate:  [] Inadequate:  [x] Other: see recommendations below     NUTRITION INTAKE/PROVISION:  [x] PO: improved appetite reported, denied issues chewing/swallowing, fluctuating intake 0-100% per flowsheets throughout admission   [] Enteral Nutrition:  [] Parenteral Nutrition:    ANTHROPOMETRICS:  Drug Dosing Weight  Height (cm): 162.6 (2025 13:28)  Weight (kg): 72.1 (2025 13:28)  BMI (kg/m2): 27.3 (2025 13:28)  BSA (m2): 1.77 (2025 13:28)  Weights:   Daily Weight in k.7 (), Weight in k.6 (), Weight in k.2 (), Weight in k.2 (), Weight in k.2 (), Weight in k.9 (-), Weight in k.8 ()   weight variance noted with prior edema per flowsheets and Dx CHF, started on HD. Weight variance may be related to fluid shifts vs bed scale inaccuracies. RD will continue to monitor weight trends as able/available.     MEDICATIONS:  MEDICATIONS  (STANDING):  ampicillin/sulbactam  IVPB 3 Gram(s) IV Intermittent every 12 hours  ampicillin/sulbactam  IVPB      aspirin enteric coated 81 milliGRAM(s) Oral daily  atorvastatin 40 milliGRAM(s) Oral at bedtime  benzocaine/menthol Lozenge 1 Lozenge Oral once  bisacodyl 5 milliGRAM(s) Oral every 12 hours  chlorhexidine 4% Liquid 1 Application(s) Topical <User Schedule>  clopidogrel Tablet      dextrose 5%. 1000 milliLiter(s) (100 mL/Hr) IV Continuous <Continuous>  dextrose 5%. 1000 milliLiter(s) (50 mL/Hr) IV Continuous <Continuous>  dextrose 50% Injectable 25 Gram(s) IV Push once  dextrose 50% Injectable 12.5 Gram(s) IV Push once  dextrose 50% Injectable 25 Gram(s) IV Push once  glucagon  Injectable 1 milliGRAM(s) IntraMuscular once  heparin   Injectable 5000 Unit(s) SubCutaneous every 8 hours  hydrALAZINE 10 milliGRAM(s) Oral three times a day  insulin glargine Injectable (LANTUS) 7 Unit(s) SubCutaneous at bedtime  insulin lispro (ADMELOG) corrective regimen sliding scale   SubCutaneous three times a day before meals  insulin lispro (ADMELOG) corrective regimen sliding scale   SubCutaneous at bedtime  insulin lispro Injectable (ADMELOG) 2 Unit(s) SubCutaneous three times a day with meals  isosorbide   dinitrate Tablet (ISORDIL) 5 milliGRAM(s) Oral three times a day  linagliptin 5 milliGRAM(s) Oral daily  milrinone Infusion 0.25 MICROgram(s)/kG/Min (5.41 mL/Hr) IV Continuous <Continuous>  pantoprazole  Injectable 40 milliGRAM(s) IV Push daily  polyethylene glycol 3350 17 Gram(s) Oral daily  senna 2 Tablet(s) Oral at bedtime    MEDICATIONS  (PRN):  acetaminophen     Tablet .. 650 milliGRAM(s) Oral every 6 hours PRN Mild Pain (1 - 3)  dextrose Oral Gel 15 Gram(s) Oral once PRN Blood Glucose LESS THAN 70 milliGRAM(s)/deciliter  HYDROmorphone  Injectable 1 milliGRAM(s) IV Push every 6 hours PRN Severe Pain (7 - 10)  melatonin 3 milliGRAM(s) Oral at bedtime PRN Insomnia  ondansetron Injectable 4 milliGRAM(s) IV Push every 6 hours PRN Nausea and/or Vomiting  sodium chloride 0.65% Nasal 1 Spray(s) Both Nostrils three times a day PRN Dryness  sodium chloride 0.9% lock flush 10 milliLiter(s) IV Push every 1 hour PRN Pre/post blood products, medications, blood draw, and to maintain line patency      NUTRITIONALLY PERTINENT LABS:   Na135 mmol/L Glu 151 mg/dL[H] K+ 4.0 mmol/L Cr  2.12 mg/dL[H] BUN 41 mg/dL[H]  Phos 2.5 mg/dL  Alb 3.4 g/dL  U/L[H] AST 72 U/L[H] Alkaline Phosphatase 171 U/L[H]    A1C with Estimated Average Glucose Result: 11.6 % (25 @ 05:40)  A1C with Estimated Average Glucose Result: >15.5 % (24 @ 06:49)      Finger Sticks:  POCT Blood Glucose.: 139 mg/dL ( @ 13:19)  POCT Blood Glucose.: 184 mg/dL ( @ 21:05)  POCT Blood Glucose.: 222 mg/dL ( @ 16:50)      NUTRITIONALLY PERTINENT MEDICATIONS/LABS:  [x] Reviewed  [x] Relevant notes on medications/labs:  - hyperglycemia noted, PMH DM, insulin ordered ; endocrinology following   - low potassium, low phosphorous noted  ; both WDL , prior hyperphosphatemia noted      EDEMA:  [x] Reviewed  [x] Relevant notes: no edema per flowsheets      GI/ I&O:  [x] Reviewed  [] Relevant notes:  [x] Other: Pt denies nausea, vomiting, diarrhea, constipation. bowel movement recorded  per flowsheets     SKIN:   [x] No pressure injuries documented, per nursing flowsheet  [] Pressure injury previously noted  [] Change in pressure injury documentation:  [x] Other: noted multiple non-pressure wounds per flowsheets: left 1st toe, B/L shins, and right dorsal foot    ESTIMATED NEEDS:  [] No change:  [x] Updated: to reflect increased needs with HD   Energy: 8824-0440 kcal/day (30-35 kcal/kg)  Protein: 65-82 g/day (1.2-1.5 g/kg)  Fluid:   ml/day or [x] defer to team  Based on: IBW 54.4 kg     NUTRITION DIAGNOSIS:  [x] Prior Dx: Limited adherence to nutrition-related recommendations  [x] New Dx: increased nutrient needs (protein and calorie) related to increased physiological demand as evidenced by HD initiation. Goal: Pt to meet >75% of estimated protein- energy needs during hospital stay.     EDUCATION:  [] Yes:   [x] Not appropriate/warranted, declined formal diet education at time of visit. Food preferences obtained from Pt/family, and will honor as able, to promote PO intake. They were made aware RD remains available and they expressed understanding.        NUTRITION CARE PLAN:  1. Diet: Continue current diet as ordered, as tolerated. Defer fluid restriction to medical team discretion   2. Supplements: Recommend Nepro oral supplement once daily (provides 420 kcal, 19 grams protein per 8 oz) in place of current diet mighty shake 2x/day (200 kcal, 7 grams Pro/4oz) to help meet elevated needs.   3. Multivitamin/mineral supplementation: Recommend adding Nephrovite pending no medical contraindications, to optimize nutrient intake/promote wound healing.  4. Monitor PO intake, PO diet tolerance, skin, weight, nutrition related labs, GI function, goals of care      [] Achieved - Continue current nutrition intervention(s)  [] Current medical condition precludes nutrition intervention at this time.    MONITORING AND EVALUATION:   RD remains available upon request and will follow up per protocol.    Mary Tim MS, RDN, CDN; available on Teams

## 2025-02-24 NOTE — PROGRESS NOTE ADULT - SUBJECTIVE AND OBJECTIVE BOX
Name of Patient : TOMASZ ALBA  MRN: [id number]  Date of visit: 02-24-25 @ 12:49      Subjective: Patient seen and examined. No new events except as noted.   Patient seen earlier this AM. Lying down in bed. Reports ongoing LE discomfort. For CO2 angiogram today     REVIEW OF SYSTEMS:    CONSTITUTIONAL: Generalized weakness   EYES/ENT: No visual changes;  No vertigo or throat pain   NECK: No pain or stiffness  RESPIRATORY: No cough, wheezing, hemoptysis; No shortness of breath  CARDIOVASCULAR: No chest pain or palpitations  GASTROINTESTINAL: No abdominal or epigastric pain. No nausea, vomiting, or hematemesis; No diarrhea or constipation. No melena or hematochezia.  GENITOURINARY: No dysuria, frequency or hematuria  NEUROLOGICAL: No numbness or weakness  SKIN/ MSK; B/L LE wounds; Pain controlled   All other review of systems is negative unless indicated above.    MEDICATIONS:  MEDICATIONS  (STANDING):  ampicillin/sulbactam  IVPB 3 Gram(s) IV Intermittent every 12 hours  ampicillin/sulbactam  IVPB      aspirin enteric coated 81 milliGRAM(s) Oral daily  atorvastatin 40 milliGRAM(s) Oral at bedtime  benzocaine/menthol Lozenge 1 Lozenge Oral once  bisacodyl 5 milliGRAM(s) Oral every 12 hours  chlorhexidine 4% Liquid 1 Application(s) Topical <User Schedule>  clopidogrel Tablet 300 milliGRAM(s) Oral once  clopidogrel Tablet      dextrose 5%. 1000 milliLiter(s) (100 mL/Hr) IV Continuous <Continuous>  dextrose 5%. 1000 milliLiter(s) (50 mL/Hr) IV Continuous <Continuous>  dextrose 50% Injectable 25 Gram(s) IV Push once  dextrose 50% Injectable 12.5 Gram(s) IV Push once  dextrose 50% Injectable 25 Gram(s) IV Push once  glucagon  Injectable 1 milliGRAM(s) IntraMuscular once  heparin   Injectable 5000 Unit(s) SubCutaneous every 8 hours  hydrALAZINE 10 milliGRAM(s) Oral three times a day  insulin glargine Injectable (LANTUS) 7 Unit(s) SubCutaneous at bedtime  insulin lispro (ADMELOG) corrective regimen sliding scale   SubCutaneous three times a day before meals  insulin lispro (ADMELOG) corrective regimen sliding scale   SubCutaneous at bedtime  insulin lispro Injectable (ADMELOG) 2 Unit(s) SubCutaneous three times a day with meals  isosorbide   dinitrate Tablet (ISORDIL) 5 milliGRAM(s) Oral three times a day  linagliptin 5 milliGRAM(s) Oral daily  milrinone Infusion 0.25 MICROgram(s)/kG/Min (5.41 mL/Hr) IV Continuous <Continuous>  pantoprazole  Injectable 40 milliGRAM(s) IV Push daily  polyethylene glycol 3350 17 Gram(s) Oral daily  senna 2 Tablet(s) Oral at bedtime      PHYSICAL EXAM:  T(C): 36.7 (02-24-25 @ 12:40), Max: 37.4 (02-24-25 @ 00:02)  HR: 102 (02-24-25 @ 12:40) (101 - 106)  BP: 106/52 (02-24-25 @ 12:40) (97/64 - 120/63)  RR: 17 (02-24-25 @ 12:40) (17 - 18)  SpO2: 92% (02-24-25 @ 12:40) (92% - 96%)  Wt(kg): --  I&O's Summary    23 Feb 2025 07:01  -  24 Feb 2025 07:00  --------------------------------------------------------  IN: 340 mL / OUT: 1400 mL / NET: -1060 mL      Appearance: Awake, lying down in bed 	  HEENT:  Eyes are open   Lymphatic: No lymphadenopathy grossly   Cardiovascular: Normal    Respiratory: normal effort , clear  Gastrointestinal:  Soft, Non-tender  Skin: No rashes,  warm to touch  Psychiatry:  Mood & affect appropriate  Musculoskeletal: B/L LE Foot wounds; Wrapped in dressing; + Edema           02-23-25 @ 07:01  -  02-24-25 @ 07:00  --------------------------------------------------------  IN: 340 mL / OUT: 1400 mL / NET: -1060 mL                                9.3    12.33 )-----------( 269      ( 24 Feb 2025 04:20 )             26.8               02-24    135  |  94[L]  |  41[H]  ----------------------------<  151[H]  4.0   |  26  |  2.12[H]    Ca    9.1      24 Feb 2025 04:20  Phos  2.5     02-24  Mg     1.9     02-24      PT/INR - ( 24 Feb 2025 04:21 )   PT: 14.8 sec;   INR: 1.29 ratio         PTT - ( 24 Feb 2025 04:21 )  PTT:31.0 sec                   Urinalysis Basic - ( 24 Feb 2025 04:20 )    Color: x / Appearance: x / SG: x / pH: x  Gluc: 151 mg/dL / Ketone: x  / Bili: x / Urobili: x   Blood: x / Protein: x / Nitrite: x   Leuk Esterase: x / RBC: x / WBC x   Sq Epi: x / Non Sq Epi: x / Bacteria: x

## 2025-02-24 NOTE — PROGRESS NOTE ADULT - PROBLEM SELECTOR PLAN 1
-continue milrinone 0.25; likely will need milrinone on discharge  - HD per nephrology  - holding spironolactone and ACE/ARB  -cont hydral 10/isordil 5  -check perfusion markers (LFTs, lactate) daily  - not a candidate for advanced therapies given advanced CKD and PAD

## 2025-02-24 NOTE — CHART NOTE - NSCHARTNOTEFT_GEN_A_CORE
SURGERY POST OP CHECK    STATUS POST PROCEDURE: s/p RLE angiogram with pop and AT angioplasty     SUBJECTIVE: Pt seen and examined at bedside. Patient reports appropriate surgical incisional pain; pain is controlled. Denies fevers/chills, chest pain, dyspnea, nausea, vomiting   Pt has not passed gas or had bowel movement yet. Pt is voiding well. Pt is tolerating diet.     --------------------------------------------------------------------------------------------------------------------------------------------------------------------------------------------------------------  OBJECTIVE:  Vital Signs Last 24 Hrs  T(C): 36.8 (24 Feb 2025 15:36), Max: 37.4 (24 Feb 2025 00:02)  T(F): 98.3 (24 Feb 2025 15:36), Max: 99.3 (24 Feb 2025 00:02)  HR: 109 (24 Feb 2025 15:36) (101 - 110)  BP: 108/64 (24 Feb 2025 15:36) (97/64 - 120/63)  BP(mean): 75 (24 Feb 2025 13:55) (72 - 83)  RR: 18 (24 Feb 2025 15:36) (17 - 18)  SpO2: 96% (24 Feb 2025 15:36) (92% - 98%)    Parameters below as of 24 Feb 2025 15:36  Patient On (Oxygen Delivery Method): room air      I&O's Summary    23 Feb 2025 07:01  -  24 Feb 2025 07:00  --------------------------------------------------------  IN: 340 mL / OUT: 1400 mL / NET: -1060 mL        Physical Exam:  General: NAD, resting in bed comfortably  Cardiac: Regular rate, normotensive  Respiratory: Nonlabored respirations, normal cw expansion, on RA  Neuro: AOx3, CNII-XII intact on gross examination  Vascular/Extremities: Warm, well perfused        RLE: Dressings in place, blistering and ulceration on the dorsal aspect of the foot        LLE: First digit dry gangrene on the plantar aspec  ----------------------------------------------------------------------------------------------------------------------------------------------------------------------------------------------------------------  ASSESSMENT: HPI:  64y.o. Female with PAD, CLTI affecting b/l LE, CHF, chronic b/l LE wound R worsen then the L, with R foot blistering and ulceration was transfer to [facility] for CO2 angiogram. Patient is currently followed by medicine and cardiology now s/p RLE angiogram with pop and AT angioplasty     PLAN:  - Diet: CC w Evening snacks   - Plavix loaded and aspirin   - Analgesia and antiemetics as needed  - Strict I&O's  - Monitor bowel function   - Incentive spirometry  - OOB as tolerated  - Monitor overnight  - Dispo: Floor

## 2025-02-24 NOTE — PROGRESS NOTE ADULT - ASSESSMENT
64F, hx of HFrEF (previously 30-35%, now 20%), never had LHC, DM, diabetic foot ulcer, severe PAD b/l, presenting initially for vascular angiogram, found to be in ADHF with volume overload. Cardiac output was preserved on RHC, but elevated filling pressures. After diuresis, now with rising creatinine requiring HD. Initiated on milrinone.     RHC: RA 20, PA 49/29 (40), PCWP 35, mVO2 51.1, CO/CI 3.8/2.2, SVR 1095  LHC: RCA , severe ostial LM disease, diffuse disease in LAD and LCx, some L to R collaterals    Cardiac testing:  cMRI 2/19/25 : LVEF is 25%, greater than 75% subendocardial scarring involving the basal to mid inferior wall of the left ventricle, left ventricular transmural scarring involving the apical septal wall and likely near transmural scarring of the apical lateral wall. 50% scarring of the left ventricular basal inferoseptal wall.  TTE 2/10/25: EF 20%, reduced RVSF  TTE 1/16/25: EF 30-35%, grade 2 DD  NST 1/24/25: small moderate defect in apical wall that is predominantly fixed    Home cardiac meds: toprol 25

## 2025-02-24 NOTE — PROGRESS NOTE ADULT - SUBJECTIVE AND OBJECTIVE BOX
SUBJECTIVE: Patient seen and examined on AM rounds. Reporting pain in B/L Lower extremities. Understands plan for angiogram today.     Vital Signs Last 24 Hrs  T(C): 37 (24 Feb 2025 04:05), Max: 37.4 (24 Feb 2025 00:02)  T(F): 98.6 (24 Feb 2025 04:05), Max: 99.3 (24 Feb 2025 00:02)  HR: 101 (24 Feb 2025 04:05) (101 - 106)  BP: 110/64 (24 Feb 2025 04:05) (97/64 - 110/64)  BP(mean): --  RR: 18 (24 Feb 2025 04:05) (18 - 18)  SpO2: 96% (24 Feb 2025 04:05) (93% - 96%)    Parameters below as of 24 Feb 2025 04:05  Patient On (Oxygen Delivery Method): room air        I&O's Detail    23 Feb 2025 07:01  -  24 Feb 2025 07:00  --------------------------------------------------------  IN:    Oral Fluid: 340 mL  Total IN: 340 mL    OUT:    Voided (mL): 1400 mL  Total OUT: 1400 mL    Total NET: -1060 mL        Physical Exam:  General: NAD, resting in bed comfortably  Cardiac: Regular rate, normotensive  Respiratory: Nonlabored respirations, normal cw expansion, on RA  Neuro: AOx3, CNII-XII intact on gross examination  Vascular/Extremities: Warm, well perfused        RLE: Dressings in place, blistering and ulceration on the dorsal aspect of the foot        LLE: First digit dry gangrene on the plantar aspec      LABS:                        9.3    12.33 )-----------( 269      ( 24 Feb 2025 04:20 )             26.8     02-24    135  |  94[L]  |  41[H]  ----------------------------<  151[H]  4.0   |  26  |  2.12[H]    Ca    9.1      24 Feb 2025 04:20  Phos  2.5     02-24  Mg     1.9     02-24      PT/INR - ( 24 Feb 2025 04:21 )   PT: 14.8 sec;   INR: 1.29 ratio         PTT - ( 24 Feb 2025 04:21 )  PTT:31.0 sec  Urinalysis Basic - ( 24 Feb 2025 04:20 )    Color: x / Appearance: x / SG: x / pH: x  Gluc: 151 mg/dL / Ketone: x  / Bili: x / Urobili: x   Blood: x / Protein: x / Nitrite: x   Leuk Esterase: x / RBC: x / WBC x   Sq Epi: x / Non Sq Epi: x / Bacteria: x        RADIOLOGY & ADDITIONAL STUDIES:

## 2025-02-24 NOTE — PROGRESS NOTE ADULT - ASSESSMENT
64F, hx of CHF (last TTE on 1/16/25 EF 30-35%, G2DD), DM, diabetic foot ulcer with a recent admission at [facility] for CHF exacerbation 1/14-1/17 p/w worsening R. leg pain and fluid secretion, was meeting sepsis criteria with podiatry having low suspicion for right cellulitis and admitted for continued management of HF exacerbation and needing ischemic eval.     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  -Left leg Arterial Duplex: Slightly tardus parvus waveform of the left popliteal artery is noted, for which underlying disease cannot be excluded. Posterior tibial artery waveform is nonpulsatile. Anterior tibial artery tardus parvus flow is noted.   Transferred to [facility] for CO2 angiogram as per vascular surgery    #  PAD Wound of lower extremity.   ·  Plan: Podiatry following, b/l serous bullae, no concerns for infection  Found to have RLE coolness  -Right Leg Arterial Duplex: Popliteal artery is occluded with negligible flow of right trifurcation arteries.  -Left leg Arterial Duplex: Slightly tardus parvus waveform of the left popliteal artery is noted, for which underlying disease cannot be excluded. Posterior tibial artery waveform is nonpulsatile. Anterior tibial artery tardus parvus flow is noted.  -Started on heparin gtt and dobutamine gtt -Will transfer to [facility] for CO2 angiogram as per vascular surgery as not candidate for CTA due to worsening SCr  -Keep compression dressing  -LE elevation above heart level at rest.  -Transferred to [facility] for CO2 angiogram   - Overall this patient is at   intermediate  risk (for cardiac death, nonfatal myocardial infarction, and nonfatal cardiac arrest perioperatively for this intermediate  risk procedure).   No cardiac contraindications for CO2 vangiogram  There  are  no further recommendation for risk stratifying imaging/stress testing prior to planned surgery  Seen by Podiatry-No acute pod intervention, local wound care only-         PAD with rest ischemia plan for Angiogram No cardiac contraindications for procedding with CO2 Angiogram,-She is optimized for procedure  02/23-Had HD yesterday Wt down to 151 Kg  Fluid status has improved      # HFrEF (congestive heart failure). Ischemic Cardiomyopathy  ·  Plan: Hx of HF, previous admission in January, on home Lasix 40 qD, Metoprolol Succ 25 qD. Not on Entresto due to insurance issues  Last TTE: LVSF moderately decreased w/ EF 30-35 %. Moderate G2DD. Mild MR  Stress test: small-sized, moderate defect(s) in the apical wall that is predominantly fixed suggestive of an infarction with minimal marcsu-infarct ischemia  Ischemic cardiomyopathy  GDMT on hold 2/2 JAMEL    Repeat TTE EF 20% with WMA RAP~~8  Likely Ischemic cardiomyopathy despite NST revealing fixed defectLHC confirming  Multivessel CAD  Remains on Milrinone add Hydrall/isosorbide      RHC: RA 20, PA 49/29 (40), PCWP 35, mVO2 51.1, CO/CI 3.8/2.2, SVR 1095    # CAD  LHC-RCA , severe ostial LM disease, diffuse disease in LAD and LCx, some L to R collaterals-seen by CTS advise cMR for viability poor target vessels for CABG-?High risk LM/LAD PCI  Had cMR-LVEF is 25%, greater than 75% subendocardial scarring involving the basal to mid inferior wall of the left ventricle, left ventricular transmural scarring involving the apical septal wall and likely near transmural scarring of the apical lateral wall. 50% scarring of the left ventricular basal inferoseptal wall.  Will need High risk PCI vs CABG though less likely 2/2 poor target vessel      Started on Milrinone to assist augmented diuresis in attempt to avoid further renal failure  Has lost Weight 170---> 164 Kg---> 161.1 Kg---> 165 Kg-->151.2---> 155 Kg    Diuretic holiday  For HD catheter placement to initiate Dialysis  02/21-Started HD-500mL  02/22-HD 1000mL  02/22-For LE CO2 angiogram-No cardiac contraindications to proceeding with procedure  02/24-CO2 angiogram    LE EDEMA no DVT      # JAMEL  Creatinine Trend: 2.12<--, 1.89<- 3.02<--3.38<--(HD)-- 4.76<--4.07<---3.90<-- 1.17  Has had 2 sessions HD for interrmittent HD to allow contrast based procedures is non oliguric

## 2025-02-24 NOTE — PROGRESS NOTE ADULT - ASSESSMENT
64y.o. Female with PAD, CLTI affecting b/l LE, CHF, chronic b/l LE wound R worsen then the L, with R foot blistering and ulceration was transfer to [facility] for CO2 angiogram. Patient is currently followed by medicine and cardiology.     Recommendations:  - RLE Angiogram, possible bilateral today 2/24 with Dr. Baltazar  - NPO @ 12 am  - 4 am labs   - Continue ASA/statin  - cards/meds cleared for CO2 angio   - Consent in chart    Vascular Surgery  [id number]

## 2025-02-25 DIAGNOSIS — E11.65 TYPE 2 DIABETES MELLITUS WITH HYPERGLYCEMIA: ICD-10-CM

## 2025-02-25 LAB
ALBUMIN SERPL ELPH-MCNC: 3.2 G/DL — LOW (ref 3.3–5)
ALP SERPL-CCNC: 122 U/L — HIGH (ref 40–120)
ALT FLD-CCNC: 33 U/L — SIGNIFICANT CHANGE UP (ref 10–45)
ANION GAP SERPL CALC-SCNC: 18 MMOL/L — HIGH (ref 5–17)
ANION GAP SERPL CALC-SCNC: 18 MMOL/L — HIGH (ref 5–17)
AST SERPL-CCNC: 19 U/L — SIGNIFICANT CHANGE UP (ref 10–40)
BILIRUB SERPL-MCNC: 1 MG/DL — SIGNIFICANT CHANGE UP (ref 0.2–1.2)
BUN SERPL-MCNC: 45 MG/DL — HIGH (ref 7–23)
BUN SERPL-MCNC: 47 MG/DL — HIGH (ref 7–23)
CALCIUM SERPL-MCNC: 8.8 MG/DL — SIGNIFICANT CHANGE UP (ref 8.4–10.5)
CALCIUM SERPL-MCNC: 8.9 MG/DL — SIGNIFICANT CHANGE UP (ref 8.4–10.5)
CHLORIDE SERPL-SCNC: 88 MMOL/L — LOW (ref 96–108)
CHLORIDE SERPL-SCNC: 89 MMOL/L — LOW (ref 96–108)
CO2 SERPL-SCNC: 23 MMOL/L — SIGNIFICANT CHANGE UP (ref 22–31)
CO2 SERPL-SCNC: 24 MMOL/L — SIGNIFICANT CHANGE UP (ref 22–31)
CREAT SERPL-MCNC: 2.34 MG/DL — HIGH (ref 0.5–1.3)
CREAT SERPL-MCNC: 2.65 MG/DL — HIGH (ref 0.5–1.3)
EGFR: 20 ML/MIN/1.73M2 — LOW
EGFR: 20 ML/MIN/1.73M2 — LOW
EGFR: 23 ML/MIN/1.73M2 — LOW
EGFR: 23 ML/MIN/1.73M2 — LOW
FERRITIN SERPL-MCNC: 1511 NG/ML — HIGH (ref 13–330)
GLUCOSE BLDC GLUCOMTR-MCNC: 168 MG/DL — HIGH (ref 70–99)
GLUCOSE BLDC GLUCOMTR-MCNC: 228 MG/DL — HIGH (ref 70–99)
GLUCOSE BLDC GLUCOMTR-MCNC: 278 MG/DL — HIGH (ref 70–99)
GLUCOSE BLDC GLUCOMTR-MCNC: 96 MG/DL — SIGNIFICANT CHANGE UP (ref 70–99)
GLUCOSE SERPL-MCNC: 226 MG/DL — HIGH (ref 70–99)
GLUCOSE SERPL-MCNC: 252 MG/DL — HIGH (ref 70–99)
HCT VFR BLD CALC: 26.1 % — LOW (ref 34.5–45)
HGB BLD-MCNC: 9.1 G/DL — LOW (ref 11.5–15.5)
IRON SATN MFR SERPL: 16 % — SIGNIFICANT CHANGE UP (ref 14–50)
IRON SATN MFR SERPL: 49 UG/DL — SIGNIFICANT CHANGE UP (ref 30–160)
LACTATE SERPL-SCNC: 1.8 MMOL/L — SIGNIFICANT CHANGE UP (ref 0.5–2)
MAGNESIUM SERPL-MCNC: 1.9 MG/DL — SIGNIFICANT CHANGE UP (ref 1.6–2.6)
MCHC RBC-ENTMCNC: 25.4 PG — LOW (ref 27–34)
MCHC RBC-ENTMCNC: 34.9 G/DL — SIGNIFICANT CHANGE UP (ref 32–36)
MCV RBC AUTO: 72.9 FL — LOW (ref 80–100)
NRBC BLD AUTO-RTO: 0 /100 WBCS — SIGNIFICANT CHANGE UP (ref 0–0)
PHOSPHATE SERPL-MCNC: 3.1 MG/DL — SIGNIFICANT CHANGE UP (ref 2.5–4.5)
PLATELET # BLD AUTO: 259 K/UL — SIGNIFICANT CHANGE UP (ref 150–400)
POTASSIUM SERPL-MCNC: 3.5 MMOL/L — SIGNIFICANT CHANGE UP (ref 3.5–5.3)
POTASSIUM SERPL-MCNC: 5.5 MMOL/L — HIGH (ref 3.5–5.3)
POTASSIUM SERPL-SCNC: 3.5 MMOL/L — SIGNIFICANT CHANGE UP (ref 3.5–5.3)
POTASSIUM SERPL-SCNC: 5.5 MMOL/L — HIGH (ref 3.5–5.3)
PROT SERPL-MCNC: 8.3 G/DL — SIGNIFICANT CHANGE UP (ref 6–8.3)
RBC # BLD: 3.58 M/UL — LOW (ref 3.8–5.2)
RBC # FLD: 20.8 % — HIGH (ref 10.3–14.5)
SODIUM SERPL-SCNC: 129 MMOL/L — LOW (ref 135–145)
SODIUM SERPL-SCNC: 131 MMOL/L — LOW (ref 135–145)
TIBC SERPL-MCNC: 303 UG/DL — SIGNIFICANT CHANGE UP (ref 220–430)
UIBC SERPL-MCNC: 254 UG/DL — SIGNIFICANT CHANGE UP (ref 110–370)
WBC # BLD: 12.19 K/UL — HIGH (ref 3.8–10.5)
WBC # FLD AUTO: 12.19 K/UL — HIGH (ref 3.8–10.5)

## 2025-02-25 PROCEDURE — 71045 X-RAY EXAM CHEST 1 VIEW: CPT | Mod: 26

## 2025-02-25 PROCEDURE — 99232 SBSQ HOSP IP/OBS MODERATE 35: CPT

## 2025-02-25 PROCEDURE — 99233 SBSQ HOSP IP/OBS HIGH 50: CPT

## 2025-02-25 RX ORDER — INSULIN LISPRO 100 U/ML
5 INJECTION, SOLUTION INTRAVENOUS; SUBCUTANEOUS
Refills: 0 | Status: DISCONTINUED | OUTPATIENT
Start: 2025-02-25 | End: 2025-03-09

## 2025-02-25 RX ORDER — IRON SUCROSE 20 MG/ML
200 INJECTION, SOLUTION INTRAVENOUS
Refills: 0 | Status: DISCONTINUED | OUTPATIENT
Start: 2025-02-25 | End: 2025-02-26

## 2025-02-25 RX ORDER — INSULIN LISPRO 100 U/ML
4 INJECTION, SOLUTION INTRAVENOUS; SUBCUTANEOUS
Refills: 0 | Status: DISCONTINUED | OUTPATIENT
Start: 2025-02-25 | End: 2025-02-25

## 2025-02-25 RX ORDER — BUMETANIDE 1 MG/1
4 TABLET ORAL DAILY
Refills: 0 | Status: DISCONTINUED | OUTPATIENT
Start: 2025-02-25 | End: 2025-03-07

## 2025-02-25 RX ORDER — INSULIN GLARGINE-YFGN 100 [IU]/ML
11 INJECTION, SOLUTION SUBCUTANEOUS AT BEDTIME
Refills: 0 | Status: DISCONTINUED | OUTPATIENT
Start: 2025-02-25 | End: 2025-03-03

## 2025-02-25 RX ORDER — BUMETANIDE 1 MG/1
4 TABLET ORAL DAILY
Refills: 0 | Status: DISCONTINUED | OUTPATIENT
Start: 2025-02-25 | End: 2025-02-25

## 2025-02-25 RX ADMIN — HEPARIN SODIUM 5000 UNIT(S): 1000 INJECTION INTRAVENOUS; SUBCUTANEOUS at 22:33

## 2025-02-25 RX ADMIN — MILRINONE LACTATE 5.41 MICROGRAM(S)/KG/MIN: 1 INJECTION, SOLUTION INTRAVENOUS at 08:06

## 2025-02-25 RX ADMIN — HEPARIN SODIUM 5000 UNIT(S): 1000 INJECTION INTRAVENOUS; SUBCUTANEOUS at 05:17

## 2025-02-25 RX ADMIN — Medication 25 MILLIGRAM(S): at 13:44

## 2025-02-25 RX ADMIN — Medication 1 MILLIGRAM(S): at 21:28

## 2025-02-25 RX ADMIN — INSULIN GLARGINE-YFGN 11 UNIT(S): 100 INJECTION, SOLUTION SUBCUTANEOUS at 22:33

## 2025-02-25 RX ADMIN — Medication 40 MILLIGRAM(S): at 11:15

## 2025-02-25 RX ADMIN — INSULIN LISPRO 2: 100 INJECTION, SOLUTION INTRAVENOUS; SUBCUTANEOUS at 08:02

## 2025-02-25 RX ADMIN — INSULIN LISPRO 3: 100 INJECTION, SOLUTION INTRAVENOUS; SUBCUTANEOUS at 12:32

## 2025-02-25 RX ADMIN — Medication 1 APPLICATION(S): at 11:17

## 2025-02-25 RX ADMIN — INSULIN LISPRO 1: 100 INJECTION, SOLUTION INTRAVENOUS; SUBCUTANEOUS at 17:48

## 2025-02-25 RX ADMIN — ISOSORBDIE DINITRATE 5 MILLIGRAM(S): 30 TABLET ORAL at 17:53

## 2025-02-25 RX ADMIN — Medication 1 MILLIGRAM(S): at 00:10

## 2025-02-25 RX ADMIN — INSULIN LISPRO 4 UNIT(S): 100 INJECTION, SOLUTION INTRAVENOUS; SUBCUTANEOUS at 12:42

## 2025-02-25 RX ADMIN — Medication 1 MILLIGRAM(S): at 20:28

## 2025-02-25 RX ADMIN — Medication 5 MILLIGRAM(S): at 17:49

## 2025-02-25 RX ADMIN — INSULIN LISPRO 5 UNIT(S): 100 INJECTION, SOLUTION INTRAVENOUS; SUBCUTANEOUS at 17:49

## 2025-02-25 RX ADMIN — LINAGLIPTIN 5 MILLIGRAM(S): 5 TABLET, FILM COATED ORAL at 11:16

## 2025-02-25 RX ADMIN — ISOSORBDIE DINITRATE 5 MILLIGRAM(S): 30 TABLET ORAL at 05:18

## 2025-02-25 RX ADMIN — BUMETANIDE 132 MILLIGRAM(S): 1 TABLET ORAL at 23:09

## 2025-02-25 RX ADMIN — Medication 1 MILLIGRAM(S): at 08:03

## 2025-02-25 RX ADMIN — CLOPIDOGREL BISULFATE 75 MILLIGRAM(S): 75 TABLET, FILM COATED ORAL at 11:16

## 2025-02-25 RX ADMIN — AMPICILLIN SODIUM AND SULBACTAM SODIUM 200 GRAM(S): 1; .5 INJECTION, POWDER, FOR SOLUTION INTRAMUSCULAR; INTRAVENOUS at 22:33

## 2025-02-25 RX ADMIN — Medication 10 MILLIGRAM(S): at 05:17

## 2025-02-25 RX ADMIN — Medication 1 MILLIGRAM(S): at 13:49

## 2025-02-25 RX ADMIN — MILRINONE LACTATE 5.41 MICROGRAM(S)/KG/MIN: 1 INJECTION, SOLUTION INTRAVENOUS at 22:34

## 2025-02-25 RX ADMIN — INSULIN LISPRO 2 UNIT(S): 100 INJECTION, SOLUTION INTRAVENOUS; SUBCUTANEOUS at 08:02

## 2025-02-25 RX ADMIN — Medication 81 MILLIGRAM(S): at 11:15

## 2025-02-25 RX ADMIN — AMPICILLIN SODIUM AND SULBACTAM SODIUM 200 GRAM(S): 1; .5 INJECTION, POWDER, FOR SOLUTION INTRAMUSCULAR; INTRAVENOUS at 11:14

## 2025-02-25 RX ADMIN — HEPARIN SODIUM 5000 UNIT(S): 1000 INJECTION INTRAVENOUS; SUBCUTANEOUS at 13:43

## 2025-02-25 RX ADMIN — Medication 5 MILLIGRAM(S): at 05:17

## 2025-02-25 RX ADMIN — IRON SUCROSE 110 MILLIGRAM(S): 20 INJECTION, SOLUTION INTRAVENOUS at 12:31

## 2025-02-25 RX ADMIN — ATORVASTATIN CALCIUM 40 MILLIGRAM(S): 80 TABLET, FILM COATED ORAL at 22:33

## 2025-02-25 NOTE — PROGRESS NOTE ADULT - SUBJECTIVE AND OBJECTIVE BOX
Name of Patient : TOMASZ ALBA  MRN: [id number]  Date of visit: 02-25-25 @ 14:13      Subjective: Patient seen and examined. No new events except as noted.   Patient seen earlier this AM. Brother and sister in law at the bedside.   Patient with cough. Denies chills, fevers.   Patient is S/P RLE angiogram with pop and AT angioplasty with vascular surgery yesterday. Denies pain. on DAPT   Tachycardic overnight. Denies chest pain or palpitations, shortness of breath or dyspnea.     REVIEW OF SYSTEMS:    CONSTITUTIONAL: Generalized weakness   EYES/ENT: No visual changes;  No vertigo or throat pain   NECK: No pain or stiffness  RESPIRATORY: + Cough,  No wheezing, hemoptysis; No shortness of breath  CARDIOVASCULAR: No chest pain or palpitations  GASTROINTESTINAL: No abdominal or epigastric pain. No nausea, vomiting, or hematemesis; No diarrhea or constipation. No melena or hematochezia.  GENITOURINARY: No dysuria, frequency or hematuria  NEUROLOGICAL: No numbness or weakness  SKIN/ MSK; B/L LE wounds; RLE S/P Angio w/ angioplasty   All other review of systems is negative unless indicated above.    MEDICATIONS:  MEDICATIONS  (STANDING):  ampicillin/sulbactam  IVPB 3 Gram(s) IV Intermittent every 12 hours  ampicillin/sulbactam  IVPB      aspirin enteric coated 81 milliGRAM(s) Oral daily  atorvastatin 40 milliGRAM(s) Oral at bedtime  benzocaine/menthol Lozenge 1 Lozenge Oral once  bisacodyl 5 milliGRAM(s) Oral every 12 hours  chlorhexidine 4% Liquid 1 Application(s) Topical <User Schedule>  clopidogrel Tablet 75 milliGRAM(s) Oral daily  clopidogrel Tablet      dextrose 5%. 1000 milliLiter(s) (100 mL/Hr) IV Continuous <Continuous>  dextrose 5%. 1000 milliLiter(s) (50 mL/Hr) IV Continuous <Continuous>  dextrose 50% Injectable 25 Gram(s) IV Push once  dextrose 50% Injectable 12.5 Gram(s) IV Push once  dextrose 50% Injectable 25 Gram(s) IV Push once  glucagon  Injectable 1 milliGRAM(s) IntraMuscular once  heparin   Injectable 5000 Unit(s) SubCutaneous every 8 hours  hydrALAZINE 25 milliGRAM(s) Oral three times a day  insulin glargine Injectable (LANTUS) 11 Unit(s) SubCutaneous at bedtime  insulin lispro (ADMELOG) corrective regimen sliding scale   SubCutaneous three times a day before meals  insulin lispro (ADMELOG) corrective regimen sliding scale   SubCutaneous at bedtime  insulin lispro Injectable (ADMELOG) 5 Unit(s) SubCutaneous three times a day with meals  iron sucrose IVPB 200 milliGRAM(s) IV Intermittent every 48 hours  isosorbide   dinitrate Tablet (ISORDIL) 5 milliGRAM(s) Oral three times a day  linagliptin 5 milliGRAM(s) Oral daily  milrinone Infusion 0.25 MICROgram(s)/kG/Min (5.41 mL/Hr) IV Continuous <Continuous>  pantoprazole  Injectable 40 milliGRAM(s) IV Push daily  polyethylene glycol 3350 17 Gram(s) Oral daily  senna 2 Tablet(s) Oral at bedtime      PHYSICAL EXAM:  T(C): 37.1 (02-25-25 @ 11:30), Max: 37.4 (02-25-25 @ 04:24)  HR: 115 (02-25-25 @ 11:30) (108 - 125)  BP: 99/66 (02-25-25 @ 11:30) (99/66 - 116/70)  RR: 18 (02-25-25 @ 11:30) (18 - 20)  SpO2: 94% (02-25-25 @ 11:30) (93% - 98%)  Wt(kg): --  I&O's Summary    24 Feb 2025 07:01  -  25 Feb 2025 07:00  --------------------------------------------------------  IN: 0 mL / OUT: 1200 mL / NET: -1200 mL    25 Feb 2025 07:01  -  25 Feb 2025 14:13  --------------------------------------------------------  IN: 120 mL / OUT: 0 mL / NET: 120 mL      Appearance: Awake, lying down in bed 	  HEENT:  Eyes are open   Lymphatic: No lymphadenopathy grossly   Cardiovascular: Normal    Respiratory: normal effort , clear  Gastrointestinal:  Soft, Non-tender  Skin: No rashes,  warm to touch  Psychiatry:  Mood & affect appropriate  Musculoskeletal: B/L LE Foot wounds; Wrapped in dressing; + Edema         02-24-25 @ 07:01  -  02-25-25 @ 07:00  --------------------------------------------------------  IN: 0 mL / OUT: 1200 mL / NET: -1200 mL    02-25-25 @ 07:01  -  02-25-25 @ 14:13  --------------------------------------------------------  IN: 120 mL / OUT: 0 mL / NET: 120 mL                              9.1    12.19 )-----------( 259      ( 25 Feb 2025 07:28 )             26.1               02-25    131[L]  |  89[L]  |  47[H]  ----------------------------<  252[H]  3.5   |  24  |  2.65[H]    Ca    8.8      25 Feb 2025 13:04  Phos  3.1     02-25  Mg     1.9     02-25    TPro  8.3  /  Alb  3.2[L]  /  TBili  1.0  /  DBili  x   /  AST  19  /  ALT  33  /  AlkPhos  122[H]  02-25    PT/INR - ( 24 Feb 2025 04:21 )   PT: 14.8 sec;   INR: 1.29 ratio         PTT - ( 24 Feb 2025 04:21 )  PTT:31.0 sec                   Urinalysis Basic - ( 25 Feb 2025 13:04 )    Color: x / Appearance: x / SG: x / pH: x  Gluc: 252 mg/dL / Ketone: x  / Bili: x / Urobili: x   Blood: x / Protein: x / Nitrite: x   Leuk Esterase: x / RBC: x / WBC x   Sq Epi: x / Non Sq Epi: x / Bacteria: x

## 2025-02-25 NOTE — PROGRESS NOTE ADULT - ASSESSMENT
64y.o. Female with PAD, CLTI affecting b/l LE, CHF, chronic b/l LE wound R worsen then the L, with R foot blistering and ulceration was transfer to [facility] for CO2 angiogram. Patient is currently followed by medicine and cardiology now s/p RLE angiogram with pop and AT angioplasty on 2/24/25.     PLAN:  - Plavix and aspirin, patient should be discharged with DAPT   - Please have wound care send pics of both feet to vascular surgery team   - Analgesia and antiemetics as needed  - Strict I&O's  - Incentive spirometry  - OOB as tolerated    Vascular Surgery   [id number]

## 2025-02-25 NOTE — PROGRESS NOTE ADULT - SUBJECTIVE AND OBJECTIVE BOX
MR#[id number]  PATIENT NAME:COLE ALBA    DATE OF SERVICE: 25 @ 07:31  Patient was seen and examined by Yordy Gilmore MD on    25 @ 07:31 .  Interim events noted.Consultant notes ,Labs,Telemetry reviewed by me       HOSPITAL COURSE: HPI:  64F, hx of CHF (last TTE on 25 EF 30-35%, G2DD), DM, diabetic foot ulcer with a recent admission at [facility] for CHF exacerbation presented as a transfer for worsening R. leg pain and fluid secretion, On non-invasive imaging demonstrating severe depletion of RLE blood flow beyond the popliteal vessel at knee and similar findings in L. Was transferred to [facility] for CO2 angiogram with Dr. Baltazar. (2025 20:05)      INTERIM EVENTS:Patient seen at bedside ,interim events noted.  -Had cath Multivessel CAD started on Milrinone for CTS evaluation albeit targets not optimal  02/15-Awake on Milrinone and Bumex gtt-seen by CTS not a surgical candidate-needs Vascular work-up for LE PAD-scheduled for Angiogram on Wednesday    Weight 170Kg---> 164 Kg--->161.1 Kg---151.2 ---> 155  Kg  -Awake is off diuretics for cMR and CO2 angiogram  -Had cMD Plan for HD catheter placement to initiate Dialysis  -s/p HD catheter had HD with 500mL removal   -Awake c/o LE pain not dyspneac  -Awake Had HD last night 1000mL removed Plan for CO2 angiogram tomorrow  -Awake Plan for CO2 Angiogram today no dyspnea     -s/p RLE angiogram with pop and AT angioplasty       [facility] -reviewed admission note, no change since admission  HEART FAILURE: Acute[x ]Chronic[ ] Systolic[x ] Diastolic[ ] Combined Systolic and Diastolic[ ]  CAD[x ] CABG[ ] PCI[ ]  DEVICES[ ] PPM[ ] ICD[ ] ILR[ ]  ATRIAL FIBRILLATION[ ] Paroxysmal[ ] Permanent[ ] CHADS2-[  ]  JAMEL[x ] CKD1[ ] CKD2[ ] CKD3[ ] CKD4[ ] ESRD[ ]  COPD[ ] HTN[x ]   DM[x ] Type1[ ] Type 2[ x]   CVA[ ] Paresis[ ]    AMBULATION: Assisted[x ] Cane/walker[ ] Independent[ ]      MEDICATIONS  (STANDING):  ampicillin/sulbactam  IVPB 3 Gram(s) IV Intermittent every 12 hours  ampicillin/sulbactam  IVPB      aspirin enteric coated 81 milliGRAM(s) Oral daily  atorvastatin 40 milliGRAM(s) Oral at bedtime  benzocaine/menthol Lozenge 1 Lozenge Oral once  bisacodyl 5 milliGRAM(s) Oral every 12 hours  chlorhexidine 4% Liquid 1 Application(s) Topical <User Schedule>  clopidogrel Tablet 75 milliGRAM(s) Oral daily  clopidogrel Tablet      dextrose 5%. 1000 milliLiter(s) (100 mL/Hr) IV Continuous <Continuous>  glucagon  Injectable 1 milliGRAM(s) IntraMuscular once  heparin   Injectable 5000 Unit(s) SubCutaneous every 8 hours  hydrALAZINE 10 milliGRAM(s) Oral three times a day  insulin glargine Injectable (LANTUS) 9 Unit(s) SubCutaneous at bedtime  insulin lispro (ADMELOG) corrective regimen sliding scale   SubCutaneous three times a day before meals  insulin lispro (ADMELOG) corrective regimen sliding scale   SubCutaneous at bedtime  insulin lispro Injectable (ADMELOG) 2 Unit(s) SubCutaneous three times a day with meals  isosorbide   dinitrate Tablet (ISORDIL) 5 milliGRAM(s) Oral three times a day  linagliptin 5 milliGRAM(s) Oral daily  milrinone Infusion 0.25 MICROgram(s)/kG/Min (5.41 mL/Hr) IV Continuous <Continuous>  pantoprazole  Injectable 40 milliGRAM(s) IV Push daily  polyethylene glycol 3350 17 Gram(s) Oral daily  senna 2 Tablet(s) Oral at bedtime    MEDICATIONS  (PRN):  acetaminophen     Tablet .. 650 milliGRAM(s) Oral every 6 hours PRN Mild Pain (1 - 3)  dextrose Oral Gel 15 Gram(s) Oral once PRN Blood Glucose LESS THAN 70 milliGRAM(s)/deciliter  HYDROmorphone  Injectable 1 milliGRAM(s) IV Push every 6 hours PRN Severe Pain (7 - 10)  melatonin 3 milliGRAM(s) Oral at bedtime PRN Insomnia  ondansetron Injectable 4 milliGRAM(s) IV Push every 6 hours PRN Nausea and/or Vomiting  sodium chloride 0.65% Nasal 1 Spray(s) Both Nostrils three times a day PRN Dryness  sodium chloride 0.9% lock flush 10 milliLiter(s) IV Push every 1 hour PRN Pre/post blood products, medications, blood draw, and to maintain line patency            REVIEW OF SYSTEMS:  Constitutional: [ ] fever, [ ]weight loss,  [x ]fatigue [ ]weight gain  Eyes: [ ] visual changes  Respiratory: [ ]shortness of breath;  [ ] cough, [ ]wheezing, [ ]chills, [ ]hemoptysis  Cardiovascular: [ ] chest pain, [ ]palpitations, [ ]dizziness,  [ x]leg swelling[ ]orthopnea[ ]PND  Gastrointestinal: [ ] abdominal pain, [ ]nausea, [ ]vomiting,  [ ]diarrhea [ ]Constipation [ ]Melena  Genitourinary: [ ] dysuria, [ ] hematuria [ ][place]  Neurologic: [ ] headaches [ ] tremors[ ]weakness [ ]Paralysis Right[ ] Left[ ]  Skin: [ ] itching, [ ]burning, [ ] rashes  Endocrine: [ ] heat or cold intolerance  Musculoskeletal: [ x] joint pain or swelling; [ ] muscle, back, or extremity pain  Psychiatric: [ ] depression, [ ]anxiety, [ ]mood swings, or [ ]difficulty sleeping  Hematologic: [ ] easy bruising, [ ] bleeding gums    [ ] All remaining systems negative except as per above.   [ ]Unable to obtain.  [x] No change in ROS since admission      Vital Signs Last 24 Hrs  T(C): 37.4 (2025 04:24), Max: 37.4 (2025 04:24)  T(F): 99.3 (2025 04:24), Max: 99.3 (2025 04:24)  HR: 125 (2025 04:24) (101 - 125)  BP: 111/63 (2025 04:24) (100/64 - 120/63)  BP(mean): 75 (2025 13:55) (72 - 83)  RR: 18 (2025 04:24) (17 - 20)  SpO2: 93% (2025 04:24) (92% - 98%)    Parameters below as of 2025 04:24  Patient On (Oxygen Delivery Method): room air      I&O's Summary    2025 07:01  -  2025 07:00  --------------------------------------------------------  IN: 0 mL / OUT: 1200 mL / NET: -1200 mL        PHYSICAL EXAM:  General: No acute distress BMI-24  HEENT: EOMI, PERRL  Neck: Supple, [ ] JVD  Lungs: Equal air entry bilaterally; [ ] rales [ ] wheezing [ ] rhonchi  Heart: Regular rate and rhythm; [x ] murmur   2/6 [ x] systolic [ ] diastolic [ ] radiation[ ] rubs [ ]  gallops  Abdomen: Nontender, bowel sounds present  Extremities: No clubbing, cyanosis, [x ] edema [ ]Pulses  equal and intact  Nervous system:  Alert & Oriented X3, no focal deficits  Psychiatric: Normal affect  Skin: No rashes or lesions    LABS:      135  |  94[L]  |  41[H]  ----------------------------<  151[H]  4.0   |  26  |  2.12[H]    Ca    9.1      2025 04:20  Phos  2.5       Mg     1.9           Creatinine Trend: 2.12<--, 1.89<--, 3.02<--, 3.38<--, 4.76<--, 4.07<--                        9.3    12.33 )-----------( 269      ( 2025 04:20 )             26.8     PT/INR - ( 2025 04:21 )   PT: 14.8 sec;   INR: 1.29 ratio         PTT - ( 2025 04:21 )  PTT:31.0 sec     Invasive Peripheral Vascular Reporting (25 @ 10:54) >  General Impressions:   General Diagnostic:      General Diagnostic Impressions     right CFA antegrade access 5 F     SFA proximally patent   distal SFA disease 50%   pop occluded with recon righ AT as single vessel run off   catheter in AT -angioplasty        TTE Limited W or WO Ultrasound Enhancing Agent (25 @ 12:39) >  CONCLUSIONS:      1. Limited TTE to assess for MR, IVC, LVOT, TR.   2. Left ventricular systolic function is severely decreased.   3. Reduced right ventricular systolic function.   4. LVOT VTI 12.0cm, Stroke volume 35cc. LVOT diameter 1.9cm.   5. Mild to moderate tricuspid regurgitation.   6. Estimated pulmonary artery systolic pressure is 32 mmHg, consistent with normal pulmonary artery pressure.   7. The inferior vena cava is normal in size measuring 1.70 cm in diameter, (normal <2.1cm) with abnormal inspiratory collapse (abnormal <50%) consistent with mildly elevated right atrial pressure (~8, range 5-10mmHg).   8. Compared to the transthoracic echocardiogram performed on 2/10/2025, RV and LV function still .           TTE W or WO Ultrasound Enhancing Agent (02.10.25 @ 12:09) >  CONCLUSIONS:      1. Left ventricular cavity is mildly dilated. Left ventricular systolic function is severely decreased with an ejection fraction of 20 % by Winchester's method of disks. Regional wall motion abnormalities present.   2. Multiple segmental abnormalities exist. See findings.   The entire septum, entire apex, entire inferior wall, mid inferolateral segment, and mid anterolateral segment are hypokinetic. All remaining scored segments are normal.     3. Reduced right ventricular systolic function.   4. No pericardial effusion seen.   5. Compared to the transthoracic echocardiogram performed on 2025, Worsening of the left ventricular function.        VA Duplex Lower Ext Vein Scan, Bilat (02.10.25 @ 17:42) >  IMPRESSION: No evidence of bilateral DVT within the imaged veins.        Nuclear Stress Test-Pharmacologic.. (25 @ 10:31) >  Conclusions:   1. Myocardial Perfusion: Abnormal.   2. Qualitative Perfusion:      - small-sized, moderate defect(s) in the apical wall that is predominantly fixed suggestive of an infarction with minimal marcus-infarct ischemia.   3. The post stress left ventricular EF is 25 %. The stress end diastolic volume is 118 ml.   4. Results D/Wcardiologist Dr. Gilmore at 18:31        RHC: RA 20, PA 49/29 (40), PCWP 35, mVO2 51.1, CO/CI 3.8/2.2, SVR 1095  LHC: RCA , severe ostial LM disease, diffuse disease in LAD and LCx, some L to R collaterals        MR Cardiac w/wo IV Cont (25 @ 09:44) >  IMPRESSION:.    The left ventricle demonstrates severe wall motion abnormalities and  function is depressed (calculated LVEF is 25%).    There is greater than 75% subendocardial scarring involving the basal to  mid inferior wall of the left ventricle.    There is left ventricular transmural scarring involving the apical septal  wall and likely near transmural scarring of the apical lateral wall.    There is about 50% scarring of the left ventricular basal inferoseptal  wall.

## 2025-02-25 NOTE — PROGRESS NOTE ADULT - SUBJECTIVE AND OBJECTIVE BOX
Patient seen and examined at bedside.    Overnight Events: s/p angiogram with R pop and AT angioplasty    REVIEW OF SYSTEMS:  CONSTITUTIONAL: No weakness, fevers or chills  EYES/ENT: No visual changes;  No dysphagia  NECK: No pain or stiffness  RESPIRATORY: No cough, wheezing, hemoptysis; No shortness of breath  CARDIOVASCULAR: No chest pain or palpitations; No lower extremity edema  GASTROINTESTINAL: No abdominal or epigastric pain. No nausea, vomiting, or hematemesis; No diarrhea or constipation. No melena or hematochezia.  BACK: No back pain  GENITOURINARY: No dysuria, frequency or hematuria  NEUROLOGICAL: No numbness or weakness  SKIN: No itching, burning, rashes, or lesions   All other review of systems is negative unless indicated above.            Current Meds:  acetaminophen     Tablet .. 650 milliGRAM(s) Oral every 6 hours PRN  ampicillin/sulbactam  IVPB 3 Gram(s) IV Intermittent every 12 hours  ampicillin/sulbactam  IVPB      aspirin enteric coated 81 milliGRAM(s) Oral daily  atorvastatin 40 milliGRAM(s) Oral at bedtime  benzocaine/menthol Lozenge 1 Lozenge Oral once  bisacodyl 5 milliGRAM(s) Oral every 12 hours  chlorhexidine 4% Liquid 1 Application(s) Topical <User Schedule>  clopidogrel Tablet 75 milliGRAM(s) Oral daily  clopidogrel Tablet      dextrose 5%. 1000 milliLiter(s) IV Continuous <Continuous>  dextrose 5%. 1000 milliLiter(s) IV Continuous <Continuous>  dextrose 50% Injectable 25 Gram(s) IV Push once  dextrose 50% Injectable 12.5 Gram(s) IV Push once  dextrose 50% Injectable 25 Gram(s) IV Push once  dextrose Oral Gel 15 Gram(s) Oral once PRN  glucagon  Injectable 1 milliGRAM(s) IntraMuscular once  heparin   Injectable 5000 Unit(s) SubCutaneous every 8 hours  hydrALAZINE 25 milliGRAM(s) Oral three times a day  HYDROmorphone  Injectable 1 milliGRAM(s) IV Push every 6 hours PRN  insulin glargine Injectable (LANTUS) 11 Unit(s) SubCutaneous at bedtime  insulin lispro (ADMELOG) corrective regimen sliding scale   SubCutaneous three times a day before meals  insulin lispro (ADMELOG) corrective regimen sliding scale   SubCutaneous at bedtime  insulin lispro Injectable (ADMELOG) 2 Unit(s) SubCutaneous three times a day with meals  iron sucrose IVPB 200 milliGRAM(s) IV Intermittent every 48 hours  isosorbide   dinitrate Tablet (ISORDIL) 5 milliGRAM(s) Oral three times a day  linagliptin 5 milliGRAM(s) Oral daily  melatonin 3 milliGRAM(s) Oral at bedtime PRN  milrinone Infusion 0.25 MICROgram(s)/kG/Min IV Continuous <Continuous>  ondansetron Injectable 4 milliGRAM(s) IV Push every 6 hours PRN  pantoprazole  Injectable 40 milliGRAM(s) IV Push daily  polyethylene glycol 3350 17 Gram(s) Oral daily  senna 2 Tablet(s) Oral at bedtime  sodium chloride 0.65% Nasal 1 Spray(s) Both Nostrils three times a day PRN  sodium chloride 0.9% lock flush 10 milliLiter(s) IV Push every 1 hour PRN      Vitals:  T(F): 98.8 (02-25), Max: 99.3 (02-25)  HR: 115 (02-25) (101 - 125)  BP: 99/66 (02-25) (99/66 - 116/70)  RR: 18 (02-25)  SpO2: 94% (02-25)  I&O's Summary    24 Feb 2025 07:01  -  25 Feb 2025 07:00  --------------------------------------------------------  IN: 0 mL / OUT: 1200 mL / NET: -1200 mL    25 Feb 2025 07:01  -  25 Feb 2025 11:50  --------------------------------------------------------  IN: 120 mL / OUT: 0 mL / NET: 120 mL        Physical Exam:  GEN: NAD  HEENT: EOMI, clear sclera  PULM: CTA b/l, no wheeze  CV: RRR S1 S2, +JVD, tunneled line  ABD: S, NT, ND  EXT: painful to exam                          9.1    12.19 )-----------( 259      ( 25 Feb 2025 07:28 )             26.1     02-25    129[L]  |  88[L]  |  45[H]  ----------------------------<  226[H]  5.5[H]   |  23  |  2.34[H]    Ca    8.9      25 Feb 2025 07:27  Phos  3.1     02-25  Mg     1.9     02-25      PT/INR - ( 24 Feb 2025 04:21 )   PT: 14.8 sec;   INR: 1.29 ratio         PTT - ( 24 Feb 2025 04:21 )  PTT:31.0 sec

## 2025-02-25 NOTE — PROGRESS NOTE ADULT - ASSESSMENT
65 y/o F with PMH of CHF (last TTE 1/2025 with EF 30-35%), DM, diabetic foot ulcer, PAD, CAD. Recent admission at [facility] for CHF exacerbation, presented to [facility] as a transfer for worsening R leg pain. Hospital course c/b acute on chronic CHF - TTE with EF 20%, severely decreased LV and RV function, multiple regional motion abnormalities, s/p LHC revealing TVD, pleural/pericardial effusions, JAMEL, leukocytosis suspected to be in the setting of LE wound on IV ABX, persistent RLE pain w/ RLE Arterial Duplex revealing popliteal artery occlusion  Plan for eventual angiogram with vascular when medically optimized. Pulmonary called to consult as pt with c/o SOB.

## 2025-02-25 NOTE — PROGRESS NOTE ADULT - SUBJECTIVE AND OBJECTIVE BOX
[facility] NEPHROLOGY- PROGRESS NOTE    64y Female with history of CHF presents as a transfer for LE CO2 angiogram. Nephrology consulted for elevated Scr.    S/P LE angiogram on 2/24.    REVIEW OF SYSTEMS:  Gen: no fevers  Cards: no chest pain  Resp: no dyspnea  GI: no nausea or vomiting or diarrhea  Vascular: + LE edema    Augmentin (Stomach Upset; Vomiting; Nausea)  No Known Allergies      Hospital Medications: Medications reviewed      VITALS:  T(F): 98.8 (02-25-25 @ 11:30), Max: 99.3 (02-25-25 @ 04:24)  HR: 115 (02-25-25 @ 11:30)  BP: 99/66 (02-25-25 @ 11:30)  RR: 18 (02-25-25 @ 11:30)  SpO2: 94% (02-25-25 @ 11:30)  Wt(kg): --    02-24 @ 07:01  -  02-25 @ 07:00  --------------------------------------------------------  IN: 0 mL / OUT: 1200 mL / NET: -1200 mL    02-25 @ 07:01  -  02-25 @ 12:06  --------------------------------------------------------  IN: 120 mL / OUT: 0 mL / NET: 120 mL        PHYSICAL EXAM:    Gen: NAD, calm  Cards: RRR, +S1/S2, no M/G/R  Resp: CTA B/L  GI: soft, NT/ND, NABS  : + stiles  Vascular: + LE wrapped B/L with trace edema, + RIJ subclavian        LABS:  02-25    129[L]  |  88[L]  |  45[H]  ----------------------------<  226[H]  5.5[H]   |  23  |  2.34[H]    Ca    8.9      25 Feb 2025 07:27  Phos  3.1     02-25  Mg     1.9     02-25      Creatinine Trend: 2.34 <--, 2.12 <--, 1.89 <--, 3.02 <--, 3.38 <--, 4.76 <--, 4.07 <--                        9.1    12.19 )-----------( 259      ( 25 Feb 2025 07:28 )             26.1     Urine Studies:  Urinalysis Basic - ( 25 Feb 2025 07:27 )    Color:  / Appearance:  / SG:  / pH:   Gluc: 226 mg/dL / Ketone:   / Bili:  / Urobili:    Blood:  / Protein:  / Nitrite:    Leuk Esterase:  / RBC:  / WBC    Sq Epi:  / Non Sq Epi:  / Bacteria:

## 2025-02-25 NOTE — PROGRESS NOTE ADULT - PROBLEM SELECTOR PLAN 2
-c/w ASA  -f/u CT surgery for CABG evaluation vs high risk PCI vs medical management -c/w ASA  -cMRI with no viability  -per Dr. Fair, plan for high risk PCI on Friday

## 2025-02-25 NOTE — PROGRESS NOTE ADULT - PROBLEM SELECTOR PLAN 1
Inpatient Plan:  - Check BG TID AC and HS while on PO diet  - Adjust Lantus to 11u QHS  - C/w Admelog 2u TID AC (HOLD if NPO)  - C/w oral Tradjenta  5mg once daily   - C/w low dose Admelog correctional scales TID AC and HS    Discharge Planning:   - Likely basal/bolus regimen given kidney function (doses TBD closer to d/c). Not a candidate for SGLT2 due to leg ulcers and also significantly decreased EGFR., can consider GLP1 in addition to Basal/bolus> will need close f/u with Optho due to h/o retinopathy.   Can send Rx for ozempic 0.25mg weekly x 4weeks and increase to 0.50mg, or mounjaro 2.5mg 2.5mg x4 weeks, then increase to 5.0mg weekly. (Patient denies medullary thyroid cancer, hx of pancreatitis or MEN2)  - Please make sure patient has DM management supplies (glucometer, lancets, strips, alcohol pads, insulin pen needles).  - Patient should check BGs before meals and at bedtime. Contact PCP/endocrinology if BG is less than 70 X1, greater than  400 X1 or persistently greater than 200s.   - Endocrine follow up: can f/u with Dr. Grace, Endocrinology.   - Needs Optho/ renal/cardiac /podiatry and vascular follow up Inpatient Plan:  - Check BG TID AC and HS while on PO diet  - Adjust Lantus to 11u QHS  - Adjust Admelog to 4u TID AC (HOLD if NPO)  - C/w oral Tradjenta 5mg once daily   - C/w low dose Admelog correctional scales TID AC and HS    Discharge Planning:   - Likely basal/bolus regimen given kidney function (doses TBD closer to d/c). Not a candidate for SGLT2 due to leg ulcers and also significantly decreased EGFR., can consider GLP1 in addition to Basal/bolus> will need close f/u with Optho due to h/o retinopathy.   Can send Rx for ozempic 0.25mg weekly x 4weeks and increase to 0.50mg, or mounjaro 2.5mg 2.5mg x4 weeks, then increase to 5.0mg weekly. (Patient denies medullary thyroid cancer, hx of pancreatitis or MEN2)  - Please make sure patient has DM management supplies (glucometer, lancets, strips, alcohol pads, insulin pen needles).  - Patient should check BGs before meals and at bedtime. Contact PCP/endocrinology if BG is less than 70 X1, greater than  400 X1 or persistently greater than 200s.   - Endocrine follow up: can f/u with Dr. Grace, Endocrinology.   - Needs Optho/ renal/cardiac /podiatry and vascular follow up Inpatient Plan:  - Check BG TID AC and HS while on PO diet  - Adjust Lantus to 11u QHS  - Adjust Admelog to 5u TID AC (HOLD if NPO)  - C/w oral Tradjenta 5mg once daily   - C/w low dose Admelog correctional scales TID AC and HS    Discharge Planning:   - Likely basal/bolus regimen given kidney function (doses TBD closer to d/c). Not a candidate for SGLT2 due to leg ulcers and also significantly decreased EGFR., can consider GLP1 in addition to Basal/bolus> will need close f/u with Optho due to h/o retinopathy.   Can send Rx for ozempic 0.25mg weekly x 4weeks and increase to 0.50mg, or mounjaro 2.5mg 2.5mg x4 weeks, then increase to 5.0mg weekly. (Patient denies medullary thyroid cancer, hx of pancreatitis or MEN2)  - Please make sure patient has DM management supplies (glucometer, lancets, strips, alcohol pads, insulin pen needles).  - Patient should check BGs before meals and at bedtime. Contact PCP/endocrinology if BG is less than 70 X1, greater than  400 X1 or persistently greater than 200s.   - Endocrine follow up: can f/u with Dr. Grace, Endocrinology.   - Needs Optho/ renal/cardiac /podiatry and vascular follow up

## 2025-02-25 NOTE — PROGRESS NOTE ADULT - SUBJECTIVE AND OBJECTIVE BOX
Follow-up Pulm Progress Note    No new respiratory events overnight.  Denies SOB/CP.     Medications:  MEDICATIONS  (STANDING):  ampicillin/sulbactam  IVPB 3 Gram(s) IV Intermittent every 12 hours  ampicillin/sulbactam  IVPB      aspirin enteric coated 81 milliGRAM(s) Oral daily  atorvastatin 40 milliGRAM(s) Oral at bedtime  benzocaine/menthol Lozenge 1 Lozenge Oral once  bisacodyl 5 milliGRAM(s) Oral every 12 hours  chlorhexidine 4% Liquid 1 Application(s) Topical <User Schedule>  clopidogrel Tablet 75 milliGRAM(s) Oral daily  clopidogrel Tablet      dextrose 5%. 1000 milliLiter(s) (100 mL/Hr) IV Continuous <Continuous>  dextrose 5%. 1000 milliLiter(s) (50 mL/Hr) IV Continuous <Continuous>  dextrose 50% Injectable 25 Gram(s) IV Push once  dextrose 50% Injectable 12.5 Gram(s) IV Push once  dextrose 50% Injectable 25 Gram(s) IV Push once  glucagon  Injectable 1 milliGRAM(s) IntraMuscular once  heparin   Injectable 5000 Unit(s) SubCutaneous every 8 hours  hydrALAZINE 25 milliGRAM(s) Oral three times a day  insulin glargine Injectable (LANTUS) 11 Unit(s) SubCutaneous at bedtime  insulin lispro (ADMELOG) corrective regimen sliding scale   SubCutaneous three times a day before meals  insulin lispro (ADMELOG) corrective regimen sliding scale   SubCutaneous at bedtime  insulin lispro Injectable (ADMELOG) 4 Unit(s) SubCutaneous three times a day with meals  iron sucrose IVPB 200 milliGRAM(s) IV Intermittent every 48 hours  isosorbide   dinitrate Tablet (ISORDIL) 5 milliGRAM(s) Oral three times a day  linagliptin 5 milliGRAM(s) Oral daily  milrinone Infusion 0.25 MICROgram(s)/kG/Min (5.41 mL/Hr) IV Continuous <Continuous>  pantoprazole  Injectable 40 milliGRAM(s) IV Push daily  polyethylene glycol 3350 17 Gram(s) Oral daily  senna 2 Tablet(s) Oral at bedtime    MEDICATIONS  (PRN):  acetaminophen     Tablet .. 650 milliGRAM(s) Oral every 6 hours PRN Mild Pain (1 - 3)  dextrose Oral Gel 15 Gram(s) Oral once PRN Blood Glucose LESS THAN 70 milliGRAM(s)/deciliter  HYDROmorphone  Injectable 1 milliGRAM(s) IV Push every 6 hours PRN Severe Pain (7 - 10)  melatonin 3 milliGRAM(s) Oral at bedtime PRN Insomnia  ondansetron Injectable 4 milliGRAM(s) IV Push every 6 hours PRN Nausea and/or Vomiting  sodium chloride 0.65% Nasal 1 Spray(s) Both Nostrils three times a day PRN Dryness  sodium chloride 0.9% lock flush 10 milliLiter(s) IV Push every 1 hour PRN Pre/post blood products, medications, blood draw, and to maintain line patency          Vital Signs Last 24 Hrs  T(C): 37.1 (25 Feb 2025 11:30), Max: 37.4 (25 Feb 2025 04:24)  T(F): 98.8 (25 Feb 2025 11:30), Max: 99.3 (25 Feb 2025 04:24)  HR: 115 (25 Feb 2025 11:30) (104 - 125)  BP: 99/66 (25 Feb 2025 11:30) (99/66 - 116/70)  BP(mean): 75 (24 Feb 2025 13:55) (75 - 79)  RR: 18 (25 Feb 2025 11:30) (17 - 20)  SpO2: 94% (25 Feb 2025 11:30) (92% - 98%)    Parameters below as of 25 Feb 2025 11:30  Patient On (Oxygen Delivery Method): room air              02-24 @ 07:01  -  02-25 @ 07:00  --------------------------------------------------------  IN: 0 mL / OUT: 1200 mL / NET: -1200 mL          LABS:                        9.1    12.19 )-----------( 259      ( 25 Feb 2025 07:28 )             26.1     02-25    129[L]  |  88[L]  |  45[H]  ----------------------------<  226[H]  5.5[H]   |  23  |  2.34[H]    Ca    8.9      25 Feb 2025 07:27  Phos  3.1     02-25  Mg     1.9     02-25            CAPILLARY BLOOD GLUCOSE      POCT Blood Glucose.: 278 mg/dL (25 Feb 2025 11:57)    PT/INR - ( 24 Feb 2025 04:21 )   PT: 14.8 sec;   INR: 1.29 ratio         PTT - ( 24 Feb 2025 04:21 )  PTT:31.0 sec  Urinalysis Basic - ( 25 Feb 2025 07:27 )    Color: x / Appearance: x / SG: x / pH: x  Gluc: 226 mg/dL / Ketone: x  / Bili: x / Urobili: x   Blood: x / Protein: x / Nitrite: x   Leuk Esterase: x / RBC: x / WBC x   Sq Epi: x / Non Sq Epi: x / Bacteria: x              Physical Examination:  PULM: coarse at bases   CVS: S1, S2 heard    RADIOLOGY REVIEWED  CXR: congestion   bibasilar atelectasis/effusions     CT chest:< from: CT Chest No Cont (01.25.25 @ 14:08) >  ACC: [id number] EXAM:  CT CHEST   ORDERED BY: TED MONROY     PROCEDURE DATE:  01/25/2025        INTERPRETATION:  Clinical information: Shortness of breath.    CT scan of the chest was obtained without administration of intravenous   contrast.    Several lymph nodes are present in the mediastinum.    Heart is enlarged in size. Calcification of the coronary arteries is   noted. Small pericardial effusion is noted.    No endobronchial lesions are noted. Patchy groundglass opacities noted   within both lungs are felt to be secondary to expiratory   technique/breathing artifact. Atelectasis is noted involving portions of   both lower lobes. This is secondary to small bilateral pleural effusions.    Below the diaphragm, visualized portions of the abdomen demonstrate   patient to be status post cholecystectomy.    Degenerative changes of the spine are noted.    IMPRESSION: Small bilateral pleural effusions and small pericardial   effusion.    --- End of Report ---    < end of copied text >      TTE:  < from: TTE W or WO Ultrasound Enhancing Agent (02.10.25 @ 12:09) >    TRANSTHORACIC ECHOCARDIOGRAM REPORT  ________________________________________________________________________________                                      _______       Pt. Name:       TOMASZ ALBA Study Date:    2/10/2025  MRN:            [id number]      YOB: 1960  Accession #:    [id number]       Age:           64 years  Account#:       [id number]    Gender:        F  Heart Rate:                     Height:        63.00 in (160.02 cm)  Rhythm:                         Weight:       147.00 lb (66.68 kg)  Blood Pressure: 112/56 mmHg     BSA/BMI:       1.70 m² / 26.04 kg/m²  ________________________________________________________________________________________  Referring Physician:       [id number] Mario Lu  Interpreting Physician:    Reg Glaser MD  Primary Sonographer:       Kassie Bergeron [facility]  Fellow (Performing):       Abbe Pickard MD  Fellow (Interpreting):     Abbe Pickard MD  Fellow (2nd Interpreting): Josefina oY MD    CPT:                ECHO TTE W CON FU LTD - .m;DEFINITY ECHO CONTRAST PER                      ML WASTED - .m;DEFINITY ECHO CONTRAST PER ML - .m  Indication(s):      Abnormal electrocardiogram ECG EKG - R94.31  Procedure:          Limited transthoracic echocardiogram.  Ordering Location:  [facility]  Admission Status:   Inpatient  Contrast Injection: Verbal consent was obtained for injection of Ultrasonic                      Enhancing Agent following a discussion of risks and                      benefits.   Endocardial visualization enhanced with 2 ml of Definity                      Ultrasound enhancing agent (Lot#:[id number] Exp.Date:08/2025                      Discarded Dose:8ml).  UEA Reaction:       Patient had no adverse reaction after injection of                    Ultrasound Enhancing Agent.  Study Information:  Image quality for this study is fair.       CONCLUSIONS:      1. Left ventricular cavity is mildly dilated. Left ventricular systolic function is severely decreased with an ejection fraction of 20 % by Winchester's method of disks. Regional wall motion abnormalities present.   2. Multiple segmental abnormalities exist. See findings.   3. Reduced right ventricular systolic function.   4. No pericardial effusion seen.   5. Compared to the transthoracic echocardiogram performed on 1/16/2025, Worsening of the left ventricular function.    ________________________________________________________________________________________  FINDINGS:     Left Ventricle:  The left ventricular cavity is mildly dilated. Left ventricular systolic function is severely decreased with a calculated ejection fraction of 20 % by the Winchester's biplane method of disks. There are regional wall motion abnormalities present.  LV Wall Scoring: The entire septum, entire apex, entire inferior wall, mid  inferolateral segment, and mid anterolateral segment are hypokinetic. All  remaining scored segments are normal.          Right Ventricle:  The right ventricular cavity is normal in size and right ventricular systolic function is reduced. Tricuspid annular plane systolic excursion (TAPSE) is 1.2 cm (normal >=1.7 cm).     Pericardium:  No pericardial effusion seen.  ____________________________________________________________________  QUANTITATIVE DATA:  Left Ventricle Measurements: (Indexed to BSA)     BiPlane LV EF%: 20 %             Right Ventricle Measurements:     TAPSE: 1.2 cm       LVOT / RVOT/ Qp/Qs Data: (Indexed to BSA)  LVOT Vmax:      0.70 m/s  LVOT Vmn:       0.455 m/s  LVOT VTI:       11.00 cm  LVOT peak grad: 2 mmHg  LVOT mean grad: 1.0 mmHg    < end of copied text >

## 2025-02-25 NOTE — PROGRESS NOTE ADULT - SUBJECTIVE AND OBJECTIVE BOX
Patient seen today for follow up inpatient Diabetes Mellitus management.    Chief Complaint: Type 2 Diabetes Mellitus     INTERVAL HX:  Patient seen in [facility] 2DSU 241 W1. Patient is alert and oriented, resting in bed. Patient reports feeling good, eating full meals and tolerating POs. FBG elevated this am to 228mg/dL, 226mg/dl on blood serum. No hypoglycemia. Noted severe hyperglycemia last evening after dinner to 353mg/dL. Patient reports her family kept feeding her, ate OSH food last night for dinner. Denies eating any snacks overnight. Patient was NPO yesterday am/lunch for angio done. Continues on oral Tradjenta 5mg once daily, tolerating well. Blood glucose levels in the last 24hrs have been 139-353mg/dL.     Review of Systems:  General: As above.  Respiratory: Denies any SOB, GARG, or cough.  Gastrointestinal: Denies any n/v/d or abdominal pain.   Endocrine: Denies any polyuria, polydipsia, polyphagia, visual changes, or numbness in feet.     Allergies  Augmentin (Stomach Upset; Vomiting; Nausea)  No Known Allergies      Intolerances  None.       MEDICATIONS  (STANDING):  acetaminophen     Tablet .. 650 milliGRAM(s) Oral every 6 hours PRN  ampicillin/sulbactam  IVPB 3 Gram(s) IV Intermittent every 12 hours  ampicillin/sulbactam  IVPB      aspirin enteric coated 81 milliGRAM(s) Oral daily  atorvastatin 40 milliGRAM(s) Oral at bedtime  benzocaine/menthol Lozenge 1 Lozenge Oral once  bisacodyl 5 milliGRAM(s) Oral every 12 hours  chlorhexidine 4% Liquid 1 Application(s) Topical <User Schedule>  clopidogrel Tablet 75 milliGRAM(s) Oral daily  clopidogrel Tablet      dextrose 5%. 1000 milliLiter(s) IV Continuous <Continuous>  dextrose 5%. 1000 milliLiter(s) IV Continuous <Continuous>  dextrose 50% Injectable 25 Gram(s) IV Push once  dextrose 50% Injectable 12.5 Gram(s) IV Push once  dextrose 50% Injectable 25 Gram(s) IV Push once  dextrose Oral Gel 15 Gram(s) Oral once PRN  glucagon  Injectable 1 milliGRAM(s) IntraMuscular once  heparin   Injectable 5000 Unit(s) SubCutaneous every 8 hours  hydrALAZINE 25 milliGRAM(s) Oral three times a day  HYDROmorphone  Injectable 1 milliGRAM(s) IV Push every 6 hours PRN  insulin glargine Injectable (LANTUS) 11 Unit(s) SubCutaneous at bedtime  insulin lispro (ADMELOG) corrective regimen sliding scale   SubCutaneous three times a day before meals  insulin lispro (ADMELOG) corrective regimen sliding scale   SubCutaneous at bedtime  insulin lispro Injectable (ADMELOG) 2 Unit(s) SubCutaneous three times a day with meals  isosorbide   dinitrate Tablet (ISORDIL) 5 milliGRAM(s) Oral three times a day  linagliptin 5 milliGRAM(s) Oral daily  melatonin 3 milliGRAM(s) Oral at bedtime PRN  milrinone Infusion 0.25 MICROgram(s)/kG/Min IV Continuous <Continuous>  ondansetron Injectable 4 milliGRAM(s) IV Push every 6 hours PRN  pantoprazole  Injectable 40 milliGRAM(s) IV Push daily  polyethylene glycol 3350 17 Gram(s) Oral daily  senna 2 Tablet(s) Oral at bedtime  sodium chloride 0.65% Nasal 1 Spray(s) Both Nostrils three times a day PRN  sodium chloride 0.9% lock flush 10 milliLiter(s) IV Push every 1 hour PRN      atorvastatin 40 milliGRAM(s) Oral at bedtime  dextrose 50% Injectable 25 Gram(s) IV Push once  dextrose 50% Injectable 12.5 Gram(s) IV Push once  dextrose 50% Injectable 25 Gram(s) IV Push once  dextrose Oral Gel 15 Gram(s) Oral once PRN  glucagon  Injectable 1 milliGRAM(s) IntraMuscular once  insulin glargine Injectable (LANTUS) 11 Unit(s) SubCutaneous at bedtime  insulin lispro (ADMELOG) corrective regimen sliding scale   SubCutaneous three times a day before meals  insulin lispro (ADMELOG) corrective regimen sliding scale   SubCutaneous at bedtime  insulin lispro Injectable (ADMELOG) 2 Unit(s) SubCutaneous three times a day with meals  linagliptin 5 milliGRAM(s) Oral daily      insulin lispro (ADMELOG) corrective regimen sliding scale   SubCutaneous three times a day before meals  insulin lispro (ADMELOG) corrective regimen sliding scale   SubCutaneous at bedtime  insulin lispro Injectable (ADMELOG) 2 Unit(s) SubCutaneous three times a day with meals      PHYSICAL EXAM:  VITALS:   T(C): 37.4 (02-25-25 @ 04:24), Max: 37.4 (02-25-25 @ 04:24)  HR: 125 (02-25-25 @ 04:24) (101 - 125)  BP: 111/63 (02-25-25 @ 04:24) (100/64 - 116/70)  RR: 18 (02-25-25 @ 04:24) (17 - 20)  SpO2: 93% (02-25-25 @ 04:24) (92% - 98%)    GENERAL: In no acute distress  Respiratory: Respirations unlabored  Extremities: Warm and dry, no edema  NEURO: Alert and oriented, appropriate     LABS:  POCT Blood Glucose.: 228 mg/dL (02-25-25 @ 07:46)  POCT Blood Glucose.: 353 mg/dL (02-24-25 @ 21:36)  POCT Blood Glucose.: 232 mg/dL (02-24-25 @ 16:50)  POCT Blood Glucose.: 139 mg/dL (02-24-25 @ 13:19)  POCT Blood Glucose.: 184 mg/dL (02-23-25 @ 21:05)  POCT Blood Glucose.: 222 mg/dL (02-23-25 @ 16:50)  POCT Blood Glucose.: 140 mg/dL (02-23-25 @ 11:19)  POCT Blood Glucose.: 219 mg/dL (02-23-25 @ 07:53)  POCT Blood Glucose.: 180 mg/dL (02-22-25 @ 22:48)  POCT Blood Glucose.: 232 mg/dL (02-22-25 @ 16:12)  POCT Blood Glucose.: 238 mg/dL (02-22-25 @ 11:51)                          9.1    12.19 )-----------( 259      ( 25 Feb 2025 07:28 )             26.1     02-25    129[L]  |  88[L]  |  45[H]  ----------------------------<  226[H]  5.5[H]   |  23  |  2.34[H]    Ca    8.9      25 Feb 2025 07:27  Phos  3.1     02-25  Mg     1.9     02-25        PT/INR - ( 24 Feb 2025 04:21 )   PT: 14.8 sec;   INR: 1.29 ratio         PTT - ( 24 Feb 2025 04:21 )  PTT:31.0 sec      Urinalysis Basic - ( 25 Feb 2025 07:27 )    Color: x / Appearance: x / SG: x / pH: x  Gluc: 226 mg/dL / Ketone: x  / Bili: x / Urobili: x   Blood: x / Protein: x / Nitrite: x   Leuk Esterase: x / RBC: x / WBC x   Sq Epi: x / Non Sq Epi: x / Bacteria: x    A1C with Estimated Average Glucose Result: A1C with Estimated Average Glucose Result: 11.6 % (01-24-25 @ 05:40)  A1C with Estimated Average Glucose Result: >15.5 % (12-13-24 @ 06:49)

## 2025-02-25 NOTE — PROGRESS NOTE ADULT - ASSESSMENT
63 YO F with PMHx of HFrEF 30-35 and grade 2 ddfxn (last TTE on 01/16/25), DM2, and diabetic foot ulcer. Recent admission at [facility] for ADHF. Patient now represents to OSH and ultimately to [facility] as a transfer for worsening right leg pain and fluid secretion. As per documentation, patient was reported to have severe depletion of RLE blood flow beyond the popliteal vessel at knee and similar findings in the left. Patient was transferred to [facility] for CO2 angiogram with Dr. Baltazar. Of note, patient also with reported visual disturbance for which patient was seen and evaluated by opthalmology. Internal Medicine has been consulted on Ms. Kriby's care for medical management.     # ADHF/ BiV HFrEF 20   - Recent admission at [facility] for ADHF and on dobutamine outpatient  - TTE 1/16 with EF 30-35 with moderately reduced LVSF and grade 2 ddfxn, normal RVSF , trace TR/ NM, and trace pericardial effusion  - NST abnormal with small-sized, moderate defect in apical wall that is predominantly fixed suggestive of an infarction with minimal marcus-infarct ischemia. Post stress LVEF 25.  - CT Chest with small BL pleural effusions and small pericardial effusion.  - RPT TTE with EF 20, severely decreased LV, decreased RVSF with TAPSE 1.2, and regional wall motion abnormalities present with entire septum, entire apex, entire inferior wall, mid inferolateral segment, and mid anterolateral segment are hypokinetic.   - RHC with RA 20, PA 49/29 (40), PCWP 35, mVO2 51.1, CO/CI 3.8/2.2, SVR 1095 w/ Multivessel CAD   - s/p zaroxyln 10 QD  - Continue on milrinone gtt as per Cardio   - CRE and sodium rising and bumex GTT stopped 2/18 and HTS stopped 2/16   - RPT limited TTE w/ LVSF  severely decreased, Reduced RVSF, LVOT VTI 12.0cm, Mild to mod TR, mildly elevated right atrial pressure  - HF following and adjusting medications   - Case discussed with EP and needs to be on GDMT for 3 months before AICD placement and should be stabilized off of inotropic support.   - Monitor I and O, diuresis per Cardio/ Renal    - GDMT as per Cardio -> on Hydralazine, Isosorbide   - CHECK CXR   - Monitor volume status   - Check daily weights    - Monitor on telemetry; Monitor electrolytes   - Cardio, HF, Renal, and EP evals appreciated; F/u recs     # CAD with TVD   - [facility] with RCA , severe ostial LM disease, diffuse disease in LAD and LCx, some L to R collaterals (Multivessel CAD)  - Case discussed with CTSx and NOT a candidate for CABG due to comorbidities  - Cardiac MRI done F/u Cardio recs   - AS and Statin   - Cardiology following --> F/u for high risk PCI, timing per Interventional Cardio -     # BL LE Edema likely in setting of ADHF  - Diuresis as per Cardio, HF and Renal   - BL LE elevation and compression  - LE Duplex neg   - Monitor for now  - Podiatry and Vascular evals appreciated; F/u recs -->S/P angio w/ angioplasty with vascular, on DAPT     # Pericardial effusion likely in setting ADHF  - TTE with trace pericardial effusion   - CT Chest with small pericardial effusion   - Denies chest pain, palpitations, chest tightness or discomfort, shortness of breath or dyspnea   - Repeat TTE in 1 month for further evaluation   - Diuresis per cardio/ renal.  - Monitor on telemetry  - Cardio following    # Pleural effusion likely in setting ADHF  - CT Chest with small BL pleural effusions  - Check CXR   - Monitor O2 saturation  - Supplement to maintain > 90%   - Diuresis per cardio/ renal.     # JAMEL with Hyponatremia and Metabolic Acidosis   - Baseline CRE 0.7-1.2 and increased to ~4  - As per OSH documentation, JAMEL was thought to be second to Unasyn/ Bumex / Entresto use and Hypotension   - US RENAL with no hydronephrosis  - Likely cardiorenal induced with low flow state in ADHF, however now rising again and concern for milrinone vs overdiuresis (prerenal) vs cardiorenal.   - Milrinone adjusted as above and diuresis remains off per HF .   - Remains nonoliguric, S/P Shiley 2/20 w/ IR and initiation of HD . HD per renal   - HF following and adjusting medications.    - Continue with HF support as above  - Avoid nephrotoxic agents  - Monitor Cr and daily BMP   - Renal eval appreciated    # Transaminitis  - Likely 2/2 hepatic congestion   - Volume removal with HD as tolerated  - Trend LFTs, if uptrend post-HD initiation, check ABD US  - Serial ABD Examinations    # Hypernatremia   - Hypernatremia likely from HTS vs over diuresis/ intravascular dehydration   - Hold further HTS   - Sodium improved   - Monitor sodium     # Leukocytosis likely in setting of BL LE ulcers with pop occlusion   - BCx negative   - UCx with probable contamination   - On unasyn for ABX. Frequency increased to Q12 as per Renal   - Leukocytosis fluctuating, however appears nontoxic with no fever spikes and monitoring closely on below unasyn.   - If febrile check pan cultures   - Trend CBC, temp curve, VS and adjust as tolerated    # Foot ulcers with worsening RLE pain second to pop artery occlusion +/- diabetic ulcers   - RLE Arterial Duplex with popliteal artery occlusion   - LLE Arterial Duplex with underlying disease cannot be excluded   - Continue on Lipitor  - Continue pain control with oxy  - Wound care called for dressing recs   - Vascular following, S/O Angio w/ Angioplasty. On DAPT    # Tachycardia likely pain related   - On Toprol and improved  - Continue to monitor on tele   - Cardio eval appreciated    # Visual Disturbance  - CT Head w/ old infarcts  - On ASA and Statin   - Opthalmology eval appreciated    # Indigestion and Constipation   - PPI, miralax and senna continued  - Monitor BMs    # Anemia likely mixed AOCD vs iron deficiency   - HH stable in 9s on HSQ  - Consider iron tabs when optimized   - Monitor HH   - Transfuse for Hgb < 8  - Maintain active T/S  - Started on Venofer X 5 as per HF     # Diabetes Mellitus A1C 11.6   - Continue on lantus 10 with tradjecta 5 and ISS   - Diabetic DASH diet   - Monitor and adjust glucose levels PRN   - Endocrine following; F/u recs     # HLD and HLD  - Continue on lipitor and toprol  - Monitor BP    # DISPO TBD  - Palliative care eval appreciated; F/u recs --> FULL CODE       Discussed with Attending and ACP   Discussed in detail with family at the bedside.

## 2025-02-25 NOTE — PROGRESS NOTE ADULT - ASSESSMENT
64y Female with history of CHF presents as a transfer for LE CO2 angiogram. Nephrology consulted for elevated Scr.    1) JAMEL: likely due to ATN and CRS for which patient initiated on RRT on 2/20 with last HD on 2/22 tolerated well with 1L removed. Scr increasing s/p LE angiogram on 2/24 with 35 mL of contrast. No need for RRT today however will likely plan for HD on 2/26 to optimize prior to LHC on 2/27. Monitor for renal recovery. On milrinone gtt. Avoid nephrotoxins.    2) HTN: BP low normal. Hydralazine increased this morning as per HF?    3) LE edema: Improving. No renal objection to start high dose oral diuretics as needed by HF. UF with subsequent HD. TTE with severely decreased LVSF. Monitor UO.     4) Anemia: Hb low normal with low TSAT. F/U ferritin. Started on venofer as per primary team. Eventual Epogen.     Patient with hyperkalemia on labs this morning however sample hemolyzed.      [facility] NEPHROLOGY  Raji Waterman M.D.  Mars Feliz D.O.  Kelley Parks M.D.  MD Nancy Kulkarni, MSN, ANP-C    Telephone: [phone]  Facsimile: [phone] [address] [place] [address], [address]  [place] [phone]

## 2025-02-25 NOTE — PROGRESS NOTE ADULT - SUBJECTIVE AND OBJECTIVE BOX
SURGERY DAILY PROGRESS NOTE    24 Hour/Overnight Events: No acute events overnight    SUBJECTIVE: Patient seen and evaluated on AM rounds. Pt is resting in bed with pain. Tolerating diet.   Denies fevers/chills, chest pain, dyspnea, abdominal pain, nausea, vomiting, and diarrhea    ------------------------------------------------------------------------------------------------------------  OBJECTIVE:  Vital Signs Last 24 Hrs  T(C): 37.4 (25 Feb 2025 04:24), Max: 37.4 (25 Feb 2025 04:24)  T(F): 99.3 (25 Feb 2025 04:24), Max: 99.3 (25 Feb 2025 04:24)  HR: 125 (25 Feb 2025 04:24) (101 - 125)  BP: 111/63 (25 Feb 2025 04:24) (100/64 - 120/63)  BP(mean): 75 (24 Feb 2025 13:55) (72 - 83)  RR: 18 (25 Feb 2025 04:24) (17 - 20)  SpO2: 93% (25 Feb 2025 04:24) (92% - 98%)    Parameters below as of 25 Feb 2025 04:24  Patient On (Oxygen Delivery Method): room air      I&O's Detail    24 Feb 2025 07:01  -  25 Feb 2025 07:00  --------------------------------------------------------  IN:  Total IN: 0 mL    OUT:    Ureteral Catheter (mL): 1000 mL    Voided (mL): 200 mL  Total OUT: 1200 mL    Total NET: -1200 mL          LABS:                        9.1    12.19 )-----------( 259      ( 25 Feb 2025 07:28 )             26.1     02-25    129[L]  |  88[L]  |  45[H]  ----------------------------<  226[H]  5.5[H]   |  23  |  2.34[H]    Ca    8.9      25 Feb 2025 07:27  Phos  3.1     02-25  Mg     1.9     02-25        PT/INR - ( 24 Feb 2025 04:21 )   PT: 14.8 sec;   INR: 1.29 ratio         PTT - ( 24 Feb 2025 04:21 )  PTT:31.0 sec  Urinalysis Basic - ( 25 Feb 2025 07:27 )    Color: x / Appearance: x / SG: x / pH: x  Gluc: 226 mg/dL / Ketone: x  / Bili: x / Urobili: x   Blood: x / Protein: x / Nitrite: x   Leuk Esterase: x / RBC: x / WBC x   Sq Epi: x / Non Sq Epi: x / Bacteria: x        Physical Exam:  General: NAD, resting in bed comfortably  Cardiac: Regular rate, normotensive  Respiratory: Nonlabored respirations, normal cw expansion, on RA  Neuro: AOx3, CNII-XII intact on gross examination  Vascular/Extremities: Warm, well perfused        RLE: Dressings in place, blistering and ulceration on the dorsal aspect of the foot        LLE: First digit dry gangrene on the plantar aspec

## 2025-02-25 NOTE — PROGRESS NOTE ADULT - PROBLEM SELECTOR PLAN 2
-TTE with severely decreased LV systolic function w/ LVEF 20%, regional WMA and multiple segmental abnormalities, reduced RV systolic function. LV function worsening since prior echo  -S/p cath with multivessel disease  -On milrinone drip   -S/p Bumex gtt   -Management per HF team.

## 2025-02-25 NOTE — PROGRESS NOTE ADULT - PROBLEM SELECTOR PLAN 1
-continue milrinone 0.25 for now  - continue to hold spironolactone  - would start diuresis if OK with nephrology, patient volume overloaded on exam  -check perfusion markers (LFTs, lactate) daily  - not a candidate for advanced therapies given advanced CKD and PAD -continue milrinone 0.25 for now  - continue to hold spironolactone  - would start diuresis if OK with nephrology, patient volume overloaded on exam  -check perfusion markers (LFTs, lactate) daily  -give IV iron sucrose x5 days  - not a candidate for advanced therapies given advanced CKD and PAD

## 2025-02-25 NOTE — PROGRESS NOTE ADULT - ASSESSMENT
65y/o F w/h/o uncontrolled T2DM (A1C 11.6%) on Tresiba and CeQur insulin patch PTA. DM c/b PAD, retinopathy, CKD, CAD. Also h/oType 2 DM, HTN, HLD. Presented to OSH with worsening right leg pain and fluid secretion. Transferred to [facility] for CO2 angiogram. Found to have Low EF to 20% in OSBALDO. Endocrine consulted for Type 2 DM management, uncontrolled. Patient reports feeling good, has been eating full meals and tolerating POs. FBG elevated this am, will increase Lantus to 11u QHS for tighter BG control. Noted patient was NPO yesterday for angiogram until around dinner time -> patient then noted to have severe hyperglycemia last evening after dinner 2/2 to patient eating OSH food brought in by family. Discussed with patient proper diet for keeping BGs controlled including limiting carbs and sweets, patient agrees and verbalizes understanding. No hypoglycemia. Will keep mealtime insulin dose as is for now. Patient to continue on oral Tradjenta 5mg once daily. Endocrine will closely monitor BG and adjust insulin as needed for BG goal 100-180mg/dL inpatient.       #uncontrolled Type 2 diabetes mellitus   A1C with Estimated Average Glucose Result: 11.6 % (01-24-25 @ 05:40)  A1C with Estimated Average Glucose Result: >15.5 % (12-13-24 @ 06:49)    Home regimen: Tresiba 40 units, CeQur insulin patch about 2-10 units TID with meals              65y/o F w/h/o uncontrolled T2DM (A1C 11.6%) on Tresiba and CeQur insulin patch PTA. DM c/b PAD, retinopathy, CKD, CAD. Also h/oType 2 DM, HTN, HLD. Presented to OSH with worsening right leg pain and fluid secretion. Transferred to [facility] for CO2 angiogram. Found to have Low EF to 20% in OSBALDO. Endocrine consulted for Type 2 DM management, uncontrolled. Patient reports feeling good, has been eating full meals and tolerating POs. FBG elevated this am, will increase Lantus to 11u QHS for tighter BG control. Noted patient was NPO yesterday for angiogram until around dinner time -> patient then noted to have severe hyperglycemia last evening after dinner 2/2 to patient eating OSH food brought in by family. Discussed with patient proper diet for keeping BGs controlled including limiting carbs and sweets, patient agrees and verbalizes understanding. No hypoglycemia. Postprandial hyperglycemia noted yesterday and today, will adjust mealtime insulin for tighter BG control. Patient to continue on oral Tradjenta 5mg once daily. Endocrine will closely monitor BG and adjust insulin as needed for BG goal 100-180mg/dL inpatient.       #uncontrolled Type 2 diabetes mellitus   A1C with Estimated Average Glucose Result: 11.6 % (01-24-25 @ 05:40)  A1C with Estimated Average Glucose Result: >15.5 % (12-13-24 @ 06:49)    Home regimen: Tresiba 40 units, CeQur insulin patch about 2-10 units TID with meals

## 2025-02-26 LAB
ADD ON TEST-SPECIMEN IN LAB: SIGNIFICANT CHANGE UP
ANION GAP SERPL CALC-SCNC: 19 MMOL/L — HIGH (ref 5–17)
BUN SERPL-MCNC: 52 MG/DL — HIGH (ref 7–23)
CALCIUM SERPL-MCNC: 9.1 MG/DL — SIGNIFICANT CHANGE UP (ref 8.4–10.5)
CHLORIDE SERPL-SCNC: 88 MMOL/L — LOW (ref 96–108)
CO2 SERPL-SCNC: 23 MMOL/L — SIGNIFICANT CHANGE UP (ref 22–31)
CREAT SERPL-MCNC: 3.39 MG/DL — HIGH (ref 0.5–1.3)
EGFR: 15 ML/MIN/1.73M2 — LOW
EGFR: 15 ML/MIN/1.73M2 — LOW
GLUCOSE BLDC GLUCOMTR-MCNC: 136 MG/DL — HIGH (ref 70–99)
GLUCOSE BLDC GLUCOMTR-MCNC: 146 MG/DL — HIGH (ref 70–99)
GLUCOSE BLDC GLUCOMTR-MCNC: 168 MG/DL — HIGH (ref 70–99)
GLUCOSE SERPL-MCNC: 105 MG/DL — HIGH (ref 70–99)
HCT VFR BLD CALC: 25.6 % — LOW (ref 34.5–45)
HGB BLD-MCNC: 9.1 G/DL — LOW (ref 11.5–15.5)
MAGNESIUM SERPL-MCNC: 1.8 MG/DL — SIGNIFICANT CHANGE UP (ref 1.6–2.6)
MCHC RBC-ENTMCNC: 25.8 PG — LOW (ref 27–34)
MCHC RBC-ENTMCNC: 35.5 G/DL — SIGNIFICANT CHANGE UP (ref 32–36)
MCV RBC AUTO: 72.5 FL — LOW (ref 80–100)
NRBC BLD AUTO-RTO: 0 /100 WBCS — SIGNIFICANT CHANGE UP (ref 0–0)
PLATELET # BLD AUTO: 254 K/UL — SIGNIFICANT CHANGE UP (ref 150–400)
POTASSIUM SERPL-MCNC: 3.3 MMOL/L — LOW (ref 3.5–5.3)
POTASSIUM SERPL-SCNC: 3.3 MMOL/L — LOW (ref 3.5–5.3)
RBC # BLD: 3.53 M/UL — LOW (ref 3.8–5.2)
RBC # FLD: 20.3 % — HIGH (ref 10.3–14.5)
SODIUM SERPL-SCNC: 130 MMOL/L — LOW (ref 135–145)
WBC # BLD: 13.6 K/UL — HIGH (ref 3.8–10.5)
WBC # FLD AUTO: 13.6 K/UL — HIGH (ref 3.8–10.5)

## 2025-02-26 PROCEDURE — 99233 SBSQ HOSP IP/OBS HIGH 50: CPT

## 2025-02-26 RX ORDER — EPOETIN ALFA 10000 [IU]/ML
8000 SOLUTION INTRAVENOUS; SUBCUTANEOUS ONCE
Refills: 0 | Status: COMPLETED | OUTPATIENT
Start: 2025-02-26 | End: 2025-02-26

## 2025-02-26 RX ORDER — MAGNESIUM SULFATE 500 MG/ML
2 SYRINGE (ML) INJECTION ONCE
Refills: 0 | Status: DISCONTINUED | OUTPATIENT
Start: 2025-02-26 | End: 2025-02-27

## 2025-02-26 RX ORDER — MIDODRINE HYDROCHLORIDE 5 MG/1
5 TABLET ORAL ONCE
Refills: 0 | Status: COMPLETED | OUTPATIENT
Start: 2025-02-26 | End: 2025-02-26

## 2025-02-26 RX ADMIN — ATORVASTATIN CALCIUM 40 MILLIGRAM(S): 80 TABLET, FILM COATED ORAL at 22:07

## 2025-02-26 RX ADMIN — AMPICILLIN SODIUM AND SULBACTAM SODIUM 200 GRAM(S): 1; .5 INJECTION, POWDER, FOR SOLUTION INTRAMUSCULAR; INTRAVENOUS at 22:03

## 2025-02-26 RX ADMIN — POLYETHYLENE GLYCOL 3350 17 GRAM(S): 17 POWDER, FOR SOLUTION ORAL at 13:31

## 2025-02-26 RX ADMIN — Medication 1 MILLIGRAM(S): at 13:41

## 2025-02-26 RX ADMIN — CLOPIDOGREL BISULFATE 75 MILLIGRAM(S): 75 TABLET, FILM COATED ORAL at 13:30

## 2025-02-26 RX ADMIN — ISOSORBDIE DINITRATE 5 MILLIGRAM(S): 30 TABLET ORAL at 13:30

## 2025-02-26 RX ADMIN — INSULIN LISPRO 0: 100 INJECTION, SOLUTION INTRAVENOUS; SUBCUTANEOUS at 08:50

## 2025-02-26 RX ADMIN — INSULIN LISPRO 0: 100 INJECTION, SOLUTION INTRAVENOUS; SUBCUTANEOUS at 13:29

## 2025-02-26 RX ADMIN — BUMETANIDE 132 MILLIGRAM(S): 1 TABLET ORAL at 13:32

## 2025-02-26 RX ADMIN — Medication 1 APPLICATION(S): at 08:51

## 2025-02-26 RX ADMIN — Medication 1 MILLIGRAM(S): at 23:02

## 2025-02-26 RX ADMIN — Medication 40 MILLIGRAM(S): at 13:31

## 2025-02-26 RX ADMIN — Medication 1 MILLIGRAM(S): at 05:13

## 2025-02-26 RX ADMIN — INSULIN LISPRO 5 UNIT(S): 100 INJECTION, SOLUTION INTRAVENOUS; SUBCUTANEOUS at 13:29

## 2025-02-26 RX ADMIN — INSULIN GLARGINE-YFGN 11 UNIT(S): 100 INJECTION, SOLUTION SUBCUTANEOUS at 22:02

## 2025-02-26 RX ADMIN — MIDODRINE HYDROCHLORIDE 5 MILLIGRAM(S): 5 TABLET ORAL at 14:47

## 2025-02-26 RX ADMIN — Medication 1 MILLIGRAM(S): at 12:06

## 2025-02-26 RX ADMIN — Medication 40 MILLIEQUIVALENT(S): at 13:30

## 2025-02-26 RX ADMIN — INSULIN LISPRO 5 UNIT(S): 100 INJECTION, SOLUTION INTRAVENOUS; SUBCUTANEOUS at 08:50

## 2025-02-26 RX ADMIN — EPOETIN ALFA 8000 UNIT(S): 10000 SOLUTION INTRAVENOUS; SUBCUTANEOUS at 17:22

## 2025-02-26 RX ADMIN — Medication 1 MILLIGRAM(S): at 06:13

## 2025-02-26 RX ADMIN — Medication 81 MILLIGRAM(S): at 13:29

## 2025-02-26 RX ADMIN — Medication 650 MILLIGRAM(S): at 17:50

## 2025-02-26 RX ADMIN — Medication 25 MILLIGRAM(S): at 13:31

## 2025-02-26 RX ADMIN — Medication 1 MILLIGRAM(S): at 22:02

## 2025-02-26 RX ADMIN — AMPICILLIN SODIUM AND SULBACTAM SODIUM 200 GRAM(S): 1; .5 INJECTION, POWDER, FOR SOLUTION INTRAMUSCULAR; INTRAVENOUS at 13:31

## 2025-02-26 RX ADMIN — LINAGLIPTIN 5 MILLIGRAM(S): 5 TABLET, FILM COATED ORAL at 13:31

## 2025-02-26 RX ADMIN — Medication 650 MILLIGRAM(S): at 17:11

## 2025-02-26 NOTE — PROGRESS NOTE ADULT - ASSESSMENT
64y Female with history of CHF presents as a transfer for LE CO2 angiogram. Nephrology consulted for elevated Scr.    1) JAMEL: likely due to ATN and CRS for which patient initiated on RRT on 2/20 with last HD on 2/22 tolerated well with 1L removed. Scr increasing with poor UO despite high dose loop diuretics s/p LE angiogram on 2/24 with 35 mL of contrast. Will plan for HD today to optimize prior to LHC on 2/27. On milrinone gtt as per HF. Monitor for renal recovery. Avoid nephrotoxins.    2) HTN: BP low normal. Will add midodrine 5 mg prior to HD to avoid intradialytic hypotension.    3) LE edema: On bumex 4 mg IV daily with poor UO. UF with HD. TTE with severely decreased LVSF. Monitor UO.     4) Anemia: Hb low with low TSAT. D/C venofer given elevated ferritin. Will give Epogen 8K with HD today. Monitor Hb.      [facility] NEPHROLOGY  Raji Waterman M.D.  Mars Feliz D.O.  Kelley Parks M.D.  MD Nancy Kulkarni, MSN, ANP-C    Telephone: [phone]  Facsimile: [phone] [address] [place] [address], [id number]  [place] [address]

## 2025-02-26 NOTE — PROGRESS NOTE ADULT - SUBJECTIVE AND OBJECTIVE BOX
Patient seen and examined at bedside.    Overnight Events: minimal UOutput    REVIEW OF SYSTEMS:  CONSTITUTIONAL: No weakness, fevers or chills  EYES/ENT: No visual changes;  No dysphagia  NECK: No pain or stiffness  RESPIRATORY: No cough, wheezing, hemoptysis; No shortness of breath  CARDIOVASCULAR: No chest pain or palpitations; No lower extremity edema  GASTROINTESTINAL: No abdominal or epigastric pain. No nausea, vomiting, or hematemesis; No diarrhea or constipation. No melena or hematochezia.  BACK: No back pain  GENITOURINARY: No dysuria, frequency or hematuria  NEUROLOGICAL: No numbness or weakness  SKIN: No itching, burning, rashes, or lesions   All other review of systems is negative unless indicated above.            Current Meds:  acetaminophen     Tablet .. 650 milliGRAM(s) Oral every 6 hours PRN  ampicillin/sulbactam  IVPB 3 Gram(s) IV Intermittent every 12 hours  ampicillin/sulbactam  IVPB      aspirin enteric coated 81 milliGRAM(s) Oral daily  atorvastatin 40 milliGRAM(s) Oral at bedtime  benzocaine/menthol Lozenge 1 Lozenge Oral once  bisacodyl 5 milliGRAM(s) Oral every 12 hours  buMETAnide IVPB 4 milliGRAM(s) IV Intermittent daily  chlorhexidine 4% Liquid 1 Application(s) Topical <User Schedule>  clopidogrel Tablet 75 milliGRAM(s) Oral daily  clopidogrel Tablet      dextrose 5%. 1000 milliLiter(s) IV Continuous <Continuous>  dextrose 5%. 1000 milliLiter(s) IV Continuous <Continuous>  dextrose 50% Injectable 25 Gram(s) IV Push once  dextrose 50% Injectable 12.5 Gram(s) IV Push once  dextrose 50% Injectable 25 Gram(s) IV Push once  dextrose Oral Gel 15 Gram(s) Oral once PRN  epoetin day-epbx (RETACRIT) Injectable 8000 Unit(s) IV Push once  glucagon  Injectable 1 milliGRAM(s) IntraMuscular once  heparin   Injectable 5000 Unit(s) SubCutaneous every 8 hours  hydrALAZINE 25 milliGRAM(s) Oral three times a day  HYDROmorphone  Injectable 1 milliGRAM(s) IV Push every 6 hours PRN  insulin glargine Injectable (LANTUS) 11 Unit(s) SubCutaneous at bedtime  insulin lispro (ADMELOG) corrective regimen sliding scale   SubCutaneous three times a day before meals  insulin lispro (ADMELOG) corrective regimen sliding scale   SubCutaneous at bedtime  insulin lispro Injectable (ADMELOG) 5 Unit(s) SubCutaneous three times a day with meals  isosorbide   dinitrate Tablet (ISORDIL) 5 milliGRAM(s) Oral three times a day  linagliptin 5 milliGRAM(s) Oral daily  melatonin 3 milliGRAM(s) Oral at bedtime PRN  midodrine 5 milliGRAM(s) Oral once  milrinone Infusion 0.25 MICROgram(s)/kG/Min IV Continuous <Continuous>  ondansetron Injectable 4 milliGRAM(s) IV Push every 6 hours PRN  pantoprazole  Injectable 40 milliGRAM(s) IV Push daily  polyethylene glycol 3350 17 Gram(s) Oral daily  potassium chloride    Tablet ER 40 milliEquivalent(s) Oral once  senna 2 Tablet(s) Oral at bedtime  sodium chloride 0.65% Nasal 1 Spray(s) Both Nostrils three times a day PRN  sodium chloride 0.9% lock flush 10 milliLiter(s) IV Push every 1 hour PRN      Vitals:  T(F): 98.6 (02-26), Max: 99.1 (02-25)  HR: 113 (02-26) (113 - 118)  BP: 100/61 (02-26) (98/59 - 108/65)  RR: 18 (02-26)  SpO2: 95% (02-26)  I&O's Summary    25 Feb 2025 07:01  -  26 Feb 2025 07:00  --------------------------------------------------------  IN: 480 mL / OUT: 175 mL / NET: 305 mL        Physical Exam:  GEN: NAD  HEENT: EOMI, clear sclera  PULM: CTA b/l, no wheeze  CV: RRR S1 S2, +JVD, tunneled line  ABD: S, NT, ND  EXT: painful to exam                            9.1    13.60 )-----------( 254      ( 26 Feb 2025 05:57 )             25.6     02-26    130[L]  |  88[L]  |  52[H]  ----------------------------<  105[H]  3.3[L]   |  23  |  3.39[H]    Ca    9.1      26 Feb 2025 05:57  Phos  3.1     02-25  Mg     1.9     02-25    TPro  8.3  /  Alb  3.2[L]  /  TBili  1.0  /  DBili  x   /  AST  19  /  ALT  33  /  AlkPhos  122[H]  02-25

## 2025-02-26 NOTE — PROGRESS NOTE ADULT - SUBJECTIVE AND OBJECTIVE BOX
MR#[id number]  PATIENT NAME:COLE ALBA    DATE OF SERVICE: 25 @ 06:09  Patient was seen and examined by Yordy Gilmore MD on    25 @ 06:09 .  Interim events noted.Consultant notes ,Labs,Telemetry reviewed by me       HOSPITAL COURSE: HPI:  64F, hx of CHF (last TTE on 25 EF 30-35%, G2DD), DM, diabetic foot ulcer with a recent admission at [facility] for CHF exacerbation presented as a transfer for worsening R. leg pain and fluid secretion, On non-invasive imaging demonstrating severe depletion of RLE blood flow beyond the popliteal vessel at knee and similar findings in L. Was transferred to [facility] for CO2 angiogram with Dr. Baltazar. (2025 20:05)      INTERIM EVENTS:Patient seen at bedside ,interim events noted.  -Had cath Multivessel CAD started on Milrinone for CTS evaluation albeit targets not optimal  02/15-Awake on Milrinone and Bumex gtt-seen by CTS not a surgical candidate-needs Vascular work-up for LE PAD-scheduled for Angiogram on Wednesday    Weight 170Kg---> 164 Kg--->161.1 Kg---151.2 ---> 155  Kg  -Awake is off diuretics for cMR and CO2 angiogram  -Had cMD Plan for HD catheter placement to initiate Dialysis  -s/p HD catheter had HD with 500mL removal   -Awake c/o LE pain not dyspneac  -Awake Had HD last night 1000mL removed Plan for CO2 angiogram tomorrow  -Awake Plan for CO2 Angiogram today no dyspnea   -s/p RLE angiogram with pop and AT angioplasty     -Awake no dyspnea Was seen by Vascular no further intervention  To discuss with Interv Cards PCI LM/LAD-if planned will iHD prior to PCI      PMH -reviewed admission note, no change since admission  HEART FAILURE: Acute[x ]Chronic[ ] Systolic[x ] Diastolic[ ] Combined Systolic and Diastolic[ ]  CAD[x ] CABG[ ] PCI[ ]  DEVICES[ ] PPM[ ] ICD[ ] ILR[ ]  ATRIAL FIBRILLATION[ ] Paroxysmal[ ] Permanent[ ] CHADS2-[  ]  JAMEL[x ] CKD1[ ] CKD2[ ] CKD3[ ] CKD4[ ] ESRD[ ]  COPD[ ] HTN[x ]   DM[x ] Type1[ ] Type 2[ x]   CVA[ ] Paresis[ ]    AMBULATION: Assisted[x ] Cane/walker[ ] Independent[ ]  MEDICATIONS  (STANDING):  ampicillin/sulbactam  IVPB 3 Gram(s) IV Intermittent every 12 hours  ampicillin/sulbactam  IVPB      aspirin enteric coated 81 milliGRAM(s) Oral daily  atorvastatin 40 milliGRAM(s) Oral at bedtime  benzocaine/menthol Lozenge 1 Lozenge Oral once  bisacodyl 5 milliGRAM(s) Oral every 12 hours  buMETAnide IVPB 4 milliGRAM(s) IV Intermittent daily  chlorhexidine 4% Liquid 1 Application(s) Topical <User Schedule>  clopidogrel Tablet 75 milliGRAM(s) Oral daily  clopidogrel Tablet      glucagon  Injectable 1 milliGRAM(s) IntraMuscular once  heparin   Injectable 5000 Unit(s) SubCutaneous every 8 hours  hydrALAZINE 25 milliGRAM(s) Oral three times a day  insulin glargine Injectable (LANTUS) 11 Unit(s) SubCutaneous at bedtime  insulin lispro (ADMELOG) corrective regimen sliding scale   SubCutaneous three times a day before meals  insulin lispro (ADMELOG) corrective regimen sliding scale   SubCutaneous at bedtime  insulin lispro Injectable (ADMELOG) 5 Unit(s) SubCutaneous three times a day with meals  iron sucrose IVPB 200 milliGRAM(s) IV Intermittent every 48 hours  isosorbide   dinitrate Tablet (ISORDIL) 5 milliGRAM(s) Oral three times a day  linagliptin 5 milliGRAM(s) Oral daily  milrinone Infusion 0.25 MICROgram(s)/kG/Min (5.41 mL/Hr) IV Continuous <Continuous>  pantoprazole  Injectable 40 milliGRAM(s) IV Push daily  polyethylene glycol 3350 17 Gram(s) Oral daily  senna 2 Tablet(s) Oral at bedtime    MEDICATIONS  (PRN):  acetaminophen     Tablet .. 650 milliGRAM(s) Oral every 6 hours PRN Mild Pain (1 - 3)  dextrose Oral Gel 15 Gram(s) Oral once PRN Blood Glucose LESS THAN 70 milliGRAM(s)/deciliter  HYDROmorphone  Injectable 1 milliGRAM(s) IV Push every 6 hours PRN Severe Pain (7 - 10)  melatonin 3 milliGRAM(s) Oral at bedtime PRN Insomnia  ondansetron Injectable 4 milliGRAM(s) IV Push every 6 hours PRN Nausea and/or Vomiting  sodium chloride 0.65% Nasal 1 Spray(s) Both Nostrils three times a day PRN Dryness  sodium chloride 0.9% lock flush 10 milliLiter(s) IV Push every 1 hour PRN Pre/post blood products, medications, blood draw, and to maintain line patency            REVIEW OF SYSTEMS:  Constitutional: [ ] fever, [ ]weight loss,  [ x]fatigue [ ]weight gain  Eyes: [ ] visual changes  Respiratory: [ ]shortness of breath;  [ ] cough, [ ]wheezing, [ ]chills, [ ]hemoptysis  Cardiovascular: [ ] chest pain, [ ]palpitations, [ ]dizziness,  [ ]leg swelling[ ]orthopnea[ ]PND  Gastrointestinal: [ ] abdominal pain, [ ]nausea, [ ]vomiting,  [ ]diarrhea [ ]Constipation [ ]Melena  Genitourinary: [ ] dysuria, [ ] hematuria [ ][place]  Neurologic: [ ] headaches [ ] tremors[ ]weakness [ ]Paralysis Right[ ] Left[ ]  Skin: [ ] itching, [ ]burning, [ ] rashes  Endocrine: [ ] heat or cold intolerance  Musculoskeletal: [ ] joint pain or swelling; [ ] muscle, back, or extremity pain  Psychiatric: [ ] depression, [ ]anxiety, [ ]mood swings, or [ ]difficulty sleeping  Hematologic: [ ] easy bruising, [ ] bleeding gums    [ ] All remaining systems negative except as per above.   [ ]Unable to obtain.  [x] No change in ROS since admission      Vital Signs Last 24 Hrs  T(C): 36.6 (2025 04:07), Max: 37.3 (2025 21:51)  T(F): 97.9 (2025 04:07), Max: 99.1 (2025 21:51)  HR: 118 (2025 04:07) (114 - 118)  BP: 103/60 (2025 04:07) (98/59 - 108/65)  BP(mean): 72 (2025 21:51) (72 - 72)  RR: 18 (2025 04:07) (18 - 18)  SpO2: 95% (2025 04:07) (91% - 95%)    Parameters below as of 2025 04:07  Patient On (Oxygen Delivery Method): room air      I&O's Summary    2025 07:01  -  2025 07:00  --------------------------------------------------------  IN: 0 mL / OUT: 1200 mL / NET: -1200 mL    2025 07:01  -  2025 06:09  --------------------------------------------------------  IN: 480 mL / OUT: 175 mL / NET: 305 mL        PHYSICAL EXAM:  General: No acute distress BMI-25  HEENT: EOMI, PERRL  Neck: Supple, [ ] JVD  Lungs: Equal air entry bilaterally; [ ] rales [ ] wheezing [ ] rhonchi  Heart: Regular rate and rhythm; [x ] murmur   2/6 [ x] systolic [ ] diastolic [ ] radiation[ ] rubs [ ]  gallops  Abdomen: Nontender, bowel sounds present  Extremities: No clubbing, cyanosis, [ x] edema [x ]Warm, well perfused        RLE: Dressings in place, blistering and ulceration on the dorsal aspect of the foot        LLE: First digit dry gangrene on the plantar aspec  Nervous system:  Alert & Oriented X3, no focal deficits  Psychiatric: Normal affect  Skin: No rashes or lesions    LABS:      131[L]  |  89[L]  |  47[H]  ----------------------------<  252[H]  3.5   |  24  |  2.65[H]    Ca    8.8      2025 13:04  Phos  3.1       Mg     1.9         TPro  8.3  /  Alb  3.2[L]  /  TBili  1.0  /  DBili  x   /  AST  19  /  ALT  33  /  AlkPhos  122[H]      Creatinine Trend: 2.65<--, 2.34<--, 2.12<--, 1.89<--, 3.02<--, 3.38<--                        9.1    12.19 )-----------( 259      ( 2025 07:28 )             26.1         12 Lead ECG (25 @ 10:31) >   SINUS TACHYCARDIA  CANNOT RULE OUT ANTERIOR INFARCT (CITED ON OR BEFORE 2025)  ABNORMAL ECG         Invasive Peripheral Vascular Reporting (25 @ 10:54) >  General Impressions:   General Diagnostic:      General Diagnostic Impressions     right CFA antegrade access 5 F     SFA proximally patent   distal SFA disease 50%   pop occluded with recon righ AT as single vessel run off   catheter in AT -angioplasty        TTE Limited W or WO Ultrasound Enhancing Agent (25 @ 12:39) >  CONCLUSIONS:      1. Limited TTE to assess for MR, IVC, LVOT, TR.   2. Left ventricular systolic function is severely decreased.   3. Reduced right ventricular systolic function.   4. LVOT VTI 12.0cm, Stroke volume 35cc. LVOT diameter 1.9cm.   5. Mild to moderate tricuspid regurgitation.   6. Estimated pulmonary artery systolic pressure is 32 mmHg, consistent with normal pulmonary artery pressure.   7. The inferior vena cava is normal in size measuring 1.70 cm in diameter, (normal <2.1cm) with abnormal inspiratory collapse (abnormal <50%) consistent with mildly elevated right atrial pressure (~8, range 5-10mmHg).   8. Compared to the transthoracic echocardiogram performed on 2/10/2025, RV and LV function still .           TTE W or WO Ultrasound Enhancing Agent (02.10.25 @ 12:09) >  CONCLUSIONS:      1. Left ventricular cavity is mildly dilated. Left ventricular systolic function is severely decreased with an ejection fraction of 20 % by Winchester's method of disks. Regional wall motion abnormalities present.   2. Multiple segmental abnormalities exist. See findings.   The entire septum, entire apex, entire inferior wall, mid inferolateral segment, and mid anterolateral segment are hypokinetic. All remaining scored segments are normal.     3. Reduced right ventricular systolic function.   4. No pericardial effusion seen.   5. Compared to the transthoracic echocardiogram performed on 2025, Worsening of the left ventricular function.        VA Duplex Lower Ext Vein Scan, Bilat (02.10.25 @ 17:42) >  IMPRESSION: No evidence of bilateral DVT within the imaged veins.        Nuclear Stress Test-Pharmacologic.. (25 @ 10:31) >  Conclusions:   1. Myocardial Perfusion: Abnormal.   2. Qualitative Perfusion:      - small-sized, moderate defect(s) in the apical wall that is predominantly fixed suggestive of an infarction with minimal marcus-infarct ischemia.   3. The post stress left ventricular EF is 25 %. The stress end diastolic volume is 118 ml.   4. Results D/Wcardiologist Dr. Gilmore at 18:31        RHC: RA 20, PA 49/29 (40), PCWP 35, mVO2 51.1, CO/CI 3.8/2.2, SVR 1095  LHC: RCA , severe      MR Cardiac w/wo IV Cont (25 @ 09:44) >  IMPRESSION:.    The left ventricle demonstrates severe wall motion abnormalities and  function is depressed (calculated LVEF is 25%).    There is greater than 75% subendocardial scarring involving the basal to  mid inferior wall of the left ventricle.    There is left ventricular transmural scarring involving the apical septal  wall and likely near transmural scarring of the apical lateral wall.    There is about 50% scarring of the left ventricular basal inferoseptal  wall.

## 2025-02-26 NOTE — PROGRESS NOTE ADULT - ASSESSMENT
64F, hx of CHF (last TTE on 1/16/25 EF 30-35%, G2DD), DM, diabetic foot ulcer with a recent admission at [facility] for CHF exacerbation 1/14-1/17 p/w worsening R. leg pain and fluid secretion, was meeting sepsis criteria with podiatry having low suspicion for right cellulitis and admitted for continued management of HF exacerbation and needing ischemic eval.     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  -Left leg Arterial Duplex: Slightly tardus parvus waveform of the left popliteal artery is noted, for which underlying disease cannot be excluded. Posterior tibial artery waveform is nonpulsatile. Anterior tibial artery tardus parvus flow is noted.   Transferred to [facility] for CO2 angiogram as per vascular surgery    #  PAD Wound of lower extremity.   ·  Plan: Podiatry following, b/l serous bullae, no concerns for infection  Found to have RLE coolness  -Right Leg Arterial Duplex: Popliteal artery is occluded with negligible flow of right trifurcation arteries.  -Left leg Arterial Duplex: Slightly tardus parvus waveform of the left popliteal artery is noted, for which underlying disease cannot be excluded. Posterior tibial artery waveform is nonpulsatile. Anterior tibial artery tardus parvus flow is noted.  -Started on heparin gtt and dobutamine gtt -Will transfer to [facility] for CO2 angiogram as per vascular surgery as not candidate for CTA due to worsening SCr  -Keep compression dressing  -LE elevation above heart level at rest.  -Transferred to [facility] for CO2 angiogram   - Overall this patient is at   intermediate  risk (for cardiac death, nonfatal myocardial infarction, and nonfatal cardiac arrest perioperatively for this intermediate  risk procedure).   No cardiac contraindications for CO2 vangiogram  There  are  no further recommendation for risk stratifying imaging/stress testing prior to planned surgery  Seen by Podiatry-No acute pod intervention, local wound care only-         PAD with rest ischemia  s/p AT/Popliteal angioplasty on DAPT        # HFrEF (congestive heart failure). Ischemic Cardiomyopathy  ·  Plan: Hx of HF, previous admission in January, on home Lasix 40 qD, Metoprolol Succ 25 qD. Not on Entresto due to insurance issues  Last TTE: LVSF moderately decreased w/ EF 30-35 %. Moderate G2DD. Mild MR  Stress test: small-sized, moderate defect(s) in the apical wall that is predominantly fixed suggestive of an infarction with minimal marcus-infarct ischemia  Ischemic cardiomyopathy  GDMT on hold 2/2 JAMEL    Repeat TTE EF 20% with WMA RAP~~8  Likely Ischemic cardiomyopathy despite NST revealing fixed defectLHC confirming  Multivessel CAD  Remains on Milrinone add Hydrall/isosorbide      RHC: RA 20, PA 49/29 (40), PCWP 35, mVO2 51.1, CO/CI 3.8/2.2, SVR 1095    # CAD  LHC-RCA , severe ostial LM disease, diffuse disease in LAD and LCx, some L to R collaterals-seen by CTS advise cMR for viability poor target vessels for CABG-?High risk LM/LAD PCI  Had cMR-LVEF is 25%, greater than 75% subendocardial scarring involving the basal to mid inferior wall of the left ventricle, left ventricular transmural scarring involving the apical septal wall and likely near transmural scarring of the apical lateral wall. 50% scarring of the left ventricular basal inferoseptal wall.  Will need High risk PCI vs CABG though less likely 2/2 poor target vessel    PLAN FOR High risk PCI    Started on Milrinone to assist augmented diuresis in attempt to avoid further renal failure  Has lost Weight 170---> 164 Kg---> 161.1 Kg---> 165 Kg-->151.2---> 155 Kg    Diuretic holiday  For HD catheter placement to initiate Dialysis  02/21-Started HD-500mL  02/22-HD 1000mL  02/22-For LE CO2 angiogram-No cardiac contraindications to proceeding with procedure  02/24-CO2 angiogram  02/25-s/p RLE angiogram with pop and AT angioplasty on DAPT  02/26-On Milrinone and Bumex tolerating Hyd/Isos Plan for possible High risk PCI-oLM/LAD      LE EDEMA no DVT      # JAMEL  Creatinine Trend: 2.65<--2.34<--2.12<--, 1.89<- 3.02<--3.38<--(HD)-- 4.76<--4.07<---3.90<-- 1.17  Has had 2 sessions HD for interrmittent HD to allow contrast based procedures is non oliguric

## 2025-02-26 NOTE — PROGRESS NOTE ADULT - SUBJECTIVE AND OBJECTIVE BOX
SUBJECTIVE:  Patient seen and evaluated on AM rounds. ADAMO. Continues to endorse pain in legs, no worse than baseline.     Vital Signs Last 24 Hrs  T(C): 36.6 (26 Feb 2025 04:07), Max: 37.3 (25 Feb 2025 21:51)  T(F): 97.9 (26 Feb 2025 04:07), Max: 99.1 (25 Feb 2025 21:51)  HR: 118 (26 Feb 2025 04:07) (114 - 118)  BP: 103/60 (26 Feb 2025 04:07) (98/59 - 108/65)  BP(mean): 72 (25 Feb 2025 21:51) (72 - 72)  RR: 18 (26 Feb 2025 04:07) (18 - 18)  SpO2: 95% (26 Feb 2025 04:07) (91% - 95%)    Parameters below as of 26 Feb 2025 04:07  Patient On (Oxygen Delivery Method): room air        I&O's Detail    25 Feb 2025 07:01  -  26 Feb 2025 07:00  --------------------------------------------------------  IN:    Oral Fluid: 480 mL  Total IN: 480 mL    OUT:    Voided (mL): 175 mL  Total OUT: 175 mL    Total NET: 305 mL        Physical Exam:  General: NAD, resting in bed comfortably  Cardiac: Regular rate, normotensive  Respiratory: Nonlabored respirations, normal cw expansion, on RA  Neuro: AOx3, CNII-XII intact on gross examination  Vascular/Extremities: Warm, well perfused        RLE: Dressings in place, blistering and ulceration on the dorsal aspect of the foot        LLE: First digit dry gangrene on the plantar aspect    LABS:                        9.1    13.60 )-----------( 254      ( 26 Feb 2025 05:57 )             25.6     02-26    130[L]  |  88[L]  |  52[H]  ----------------------------<  105[H]  3.3[L]   |  23  |  3.39[H]    Ca    9.1      26 Feb 2025 05:57  Phos  3.1     02-25  Mg     1.9     02-25    TPro  8.3  /  Alb  3.2[L]  /  TBili  1.0  /  DBili  x   /  AST  19  /  ALT  33  /  AlkPhos  122[H]  02-25      Urinalysis Basic - ( 26 Feb 2025 05:57 )    Color: x / Appearance: x / SG: x / pH: x  Gluc: 105 mg/dL / Ketone: x  / Bili: x / Urobili: x   Blood: x / Protein: x / Nitrite: x   Leuk Esterase: x / RBC: x / WBC x   Sq Epi: x / Non Sq Epi: x / Bacteria: x        RADIOLOGY & ADDITIONAL STUDIES:

## 2025-02-26 NOTE — PROGRESS NOTE ADULT - ASSESSMENT
63 YO F with PMHx of HFrEF 30-35 and grade 2 ddfxn (last TTE on 01/16/25), DM2, and diabetic foot ulcer. Recent admission at [facility] for ADHF. Patient now represents to OSH and ultimately to [facility] as a transfer for worsening right leg pain and fluid secretion. As per documentation, patient was reported to have severe depletion of RLE blood flow beyond the popliteal vessel at knee and similar findings in the left. Patient was transferred to [facility] for CO2 angiogram with Dr. Baltazar. Of note, patient also with reported visual disturbance for which patient was seen and evaluated by opthalmology. Internal Medicine has been consulted on Ms. Kirby's care for medical management.     # ADHF/ BiV HFrEF 20   - Recent admission at [facility] for ADHF and on dobutamine outpatient  - TTE 1/16 with EF 30-35 with moderately reduced LVSF and grade 2 ddfxn, normal RVSF , trace TR/ NM, and trace pericardial effusion  - NST abnormal with small-sized, moderate defect in apical wall that is predominantly fixed suggestive of an infarction with minimal marcus-infarct ischemia. Post stress LVEF 25.  - CT Chest with small BL pleural effusions and small pericardial effusion.  - RPT TTE with EF 20, severely decreased LV, decreased RVSF with TAPSE 1.2, and regional wall motion abnormalities present with entire septum, entire apex, entire inferior wall, mid inferolateral segment, and mid anterolateral segment are hypokinetic.   - RHC with RA 20, PA 49/29 (40), PCWP 35, mVO2 51.1, CO/CI 3.8/2.2, SVR 1095 w/ Multivessel CAD   - s/p zaroxyln 10 QD  - CRE and sodium rising and bumex GTT stopped 2/18 and HTS stopped 2/16   - RPT limited TTE w/ LVSF severely decreased, reduced RVSF, LVOT VTI 12, mild to mod TR, and mildly elevated right atrial pressure  - Continue on milrinone gtt as per Cardio   - Continue on hydral 25 and isordil 5  - HF and cards following and adjusting medications   - Case discussed with EP and needs to be on GDMT for 3 months before AICD placement and should be stabilized off of inotropic support.   - Monitor I and O, diuresis per Cardio/ Renal    - GDMT as per Cardio -> on Hydralazine, Isosorbide   - Monitor volume status   - Check daily weights    - Monitor on telemetry; Monitor electrolytes   - Cardio, HF, Renal, and EP evals appreciated; F/u recs     # CAD with TVD   - [facility] with RCA , severe ostial LM disease, diffuse disease in LAD and LCx, some L to R collaterals (Multivessel CAD)  - Case discussed with CTSx and NOT a candidate for CABG due to comorbidities  - Cardiac MRI with greater than 75% subendocardial scarring of the basal to mid-inferior wall of the LV and 50% scarring of the left ventricular basal inferoseptal wall. There is also left ventricular transmural scarring involving the apical septal wall and likely near transmural scarring of the apical lateral wall.  - Plan for RPT [facility] with high risk PCI (date to be determined).   - AS and Statin   - Cardiology following     # BL LE Edema likely in setting of ADHF  - Diuresis as per Cardio, HF and Renal   - BL LE elevation and compression  - LE Duplex neg   - Monitor for now  - Podiatry and Vascular evals appreciated; F/u recs -->S/P angio w/ angioplasty with vascular, on DAPT     # Pericardial effusion likely in setting ADHF  - TTE with trace pericardial effusion   - CT Chest with small pericardial effusion   - Denies chest pain, palpitations, chest tightness or discomfort, shortness of breath or dyspnea   - Repeat TTE in 1 month for further evaluation   - Diuresis per cardio/ renal.  - Monitor on telemetry  - Cardio following    # Pleural effusion likely in setting ADHF  - CT Chest with small BL pleural effusions  - Monitor O2 saturation  - Supplement to maintain > 90%   - Diuresis per cardio/ renal.     # JAMEL with Hyponatremia and Metabolic Acidosis   - Baseline CRE 0.7-1.2 and increased to ~4  - As per OSH documentation, JAMEL was thought to be second to Unasyn/ Bumex / Entresto use and Hypotension   - US RENAL with no hydronephrosis  - Likely cardiorenal induced with low flow state in ADHF, however now rising again and concern for milrinone vs overdiuresis (prerenal) vs cardiorenal.   - Milrinone adjusted and diuresis turned off, however no improvement/ worsened in renal function noted.   - s/p shiley placement and started on HD 2/20  - c/w HD per renal   - c/w high dose bumex 4mg IV QD, however remains oliguric .   - HF and renal following   - Avoid nephrotoxic agents  - Monitor Cr and daily BMP     # Transaminitis  - Likely 2/2 hepatic congestion   - Volume removal with HD as tolerated  - Trend LFTs, if uptrend post-HD initiation, check ABD US  - Serial ABD Examinations    # Hypernatremia   - Hypernatremia likely from HTS vs over diuresis/ intravascular dehydration   - Hold further HTS   - Sodium improved   - Monitor sodium     # Leukocytosis likely in setting of BL LE ulcers with pop occlusion   - BCx negative   - UCx with probable contamination   - On unasyn for ABX. Frequency increased to Q12 as per Renal   - Leukocytosis fluctuating, however appears nontoxic with no fever spikes and monitoring closely on below unasyn.   - If febrile check pan cultures   - Trend CBC, temp curve, VS and adjust as tolerated    # Foot ulcers with worsening RLE pain second to pop artery occlusion +/- diabetic ulcers   - RLE Arterial Duplex with popliteal artery occlusion   - LLE Arterial Duplex with underlying disease cannot be excluded   - s/p angio with pop and AT VERA on 2/24   - Continue on Lipitor  - Continue on DAPT  - Continue pain control with oxy  - Wound care called for dressing recs   - Vascular following,     # Tachycardia likely pain related   - On Toprol and improved  - Continue to monitor on tele   - Cardio eval appreciated    # Visual Disturbance  - CT Head w/ old infarcts  - On ASA and Statin   - Opthalmology eval appreciated    # Indigestion and Constipation   - PPI, miralax and senna continued  - Monitor BMs    # Anemia likely mixed AOCD vs iron deficiency   - HH stable in 9s on HSQ  - Anemia panel with AOCD   - Started on venofer, but ferritin high and now stopped   - Continue on EPO with HD  - Monitor HH   - Transfuse for Hgb < 8  - Maintain active T/S    # Diabetes Mellitus A1C 11.6   - Continue on lantus 10 with tradjecta 5 and ISS   - Diabetic DASH diet   - Monitor and adjust glucose levels PRN   - Endocrine following; F/u recs     # HLD and HLD  - Continue on lipitor and toprol  - Monitor BP    # DISPO TBD  - Palliative care called and patient remains FULL CODE     Discussed with Attending  65 YO F with PMHx of HFrEF 30-35 and grade 2 ddfxn (last TTE on 01/16/25), DM2, and diabetic foot ulcer. Recent admission at [facility] for ADHF. Patient now represents to OSH and ultimately to [facility] as a transfer for worsening right leg pain and fluid secretion. As per documentation, patient was reported to have severe depletion of RLE blood flow beyond the popliteal vessel at knee and similar findings in the left. Patient was transferred to [facility] for CO2 angiogram with Dr. Baltazar. Of note, patient also with reported visual disturbance for which patient was seen and evaluated by opthalmology. Internal Medicine has been consulted on Ms. Kirby's care for medical management.     # ADHF/ BiV HFrEF 20   - Recent admission at [facility] for ADHF and on dobutamine outpatient  - TTE 1/16 with EF 30-35 with moderately reduced LVSF and grade 2 ddfxn, normal RVSF , trace TR/ AZ, and trace pericardial effusion  - NST abnormal with small-sized, moderate defect in apical wall that is predominantly fixed suggestive of an infarction with minimal marcus-infarct ischemia. Post stress LVEF 25.  - CT Chest with small BL pleural effusions and small pericardial effusion.  - RPT TTE with EF 20, severely decreased LV, decreased RVSF with TAPSE 1.2, and regional wall motion abnormalities present with entire septum, entire apex, entire inferior wall, mid inferolateral segment, and mid anterolateral segment are hypokinetic.   - RHC with RA 20, PA 49/29 (40), PCWP 35, mVO2 51.1, CO/CI 3.8/2.2, SVR 1095 w/ Multivessel CAD   - s/p zaroxyln 10 QD  - CRE and sodium rising and bumex GTT stopped 2/18 and HTS stopped 2/16   - RPT limited TTE w/ LVSF severely decreased, reduced RVSF, LVOT VTI 12, mild to mod TR, and mildly elevated right atrial pressure  - Continue on milrinone gtt as per Cardio   - Continue on hydral 25 and isordil 5  - HF and cards following and adjusting medications   - Case discussed with EP and needs to be on GDMT for 3 months before AICD placement and should be stabilized off of inotropic support.   - Monitor I and O, diuresis per Cardio/ Renal    - GDMT as per Cardio -> on Hydralazine, Isosorbide   - Monitor volume status   - Check daily weights    - Monitor on telemetry; Monitor electrolytes   - Cardio, HF, Renal, and EP evals appreciated; F/u recs     # CAD with TVD   - [facility] with RCA , severe ostial LM disease, diffuse disease in LAD and LCx, some L to R collaterals (Multivessel CAD)  - Case discussed with CTSx and NOT a candidate for CABG due to comorbidities  - Cardiac MRI with greater than 75% subendocardial scarring of the basal to mid-inferior wall of the LV and 50% scarring of the left ventricular basal inferoseptal wall. There is also left ventricular transmural scarring involving the apical septal wall and likely near transmural scarring of the apical lateral wall.  - Plan for RPT [facility] with high risk PCI , plan for friday, discussed with intervention team   - AS and Statin   - Cardiology following     # BL LE Edema likely in setting of ADHF  - Diuresis as per Cardio, HF and Renal   - BL LE elevation and compression  - LE Duplex neg   - Monitor for now  - Podiatry and Vascular evals appreciated; F/u recs -->S/P angio w/ angioplasty with vascular, on DAPT     # Pericardial effusion likely in setting ADHF  - TTE with trace pericardial effusion   - CT Chest with small pericardial effusion   - Denies chest pain, palpitations, chest tightness or discomfort, shortness of breath or dyspnea   - Repeat TTE in 1 month for further evaluation   - Diuresis per cardio/ renal.  - Monitor on telemetry  - Cardio following    # Pleural effusion likely in setting ADHF  - CT Chest with small BL pleural effusions  - Monitor O2 saturation  - Supplement to maintain > 90%   - Diuresis per cardio/ renal.     # JAMEL with Hyponatremia and Metabolic Acidosis   - Baseline CRE 0.7-1.2 and increased to ~4  - As per OSH documentation, JAMEL was thought to be second to Unasyn/ Bumex / Entresto use and Hypotension   - US RENAL with no hydronephrosis  - Likely cardiorenal induced with low flow state in ADHF, however now rising again and concern for milrinone vs overdiuresis (prerenal) vs cardiorenal.   - Milrinone adjusted and diuresis turned off, however no improvement/ worsened in renal function noted.   - s/p shiley placement and started on HD 2/20  - c/w HD per renal   - c/w high dose bumex 4mg IV QD, however remains oliguric .   - HF and renal following   - Avoid nephrotoxic agents  - Monitor Cr and daily BMP     # Transaminitis  - Likely 2/2 hepatic congestion   - Volume removal with HD as tolerated  - Trend LFTs, if uptrend post-HD initiation, check ABD US  - Serial ABD Examinations    # Hypernatremia   - Hypernatremia likely from HTS vs over diuresis/ intravascular dehydration   - Hold further HTS   - Sodium improved   - Monitor sodium     # Leukocytosis likely in setting of BL LE ulcers with pop occlusion   - BCx negative   - UCx with probable contamination   - On unasyn for ABX. Frequency increased to Q12 as per Renal   - Leukocytosis fluctuating, however appears nontoxic with no fever spikes and monitoring closely on below unasyn.   - If febrile check pan cultures   - Trend CBC, temp curve, VS and adjust as tolerated    # Foot ulcers with worsening RLE pain second to pop artery occlusion +/- diabetic ulcers   - RLE Arterial Duplex with popliteal artery occlusion   - LLE Arterial Duplex with underlying disease cannot be excluded   - s/p angio with pop and AT VERA on 2/24   - Continue on Lipitor  - Continue on DAPT  - Continue pain control with oxy  - Wound care called for dressing recs   - Vascular following,     # Tachycardia likely pain related   - On Toprol and improved  - Continue to monitor on tele   - Cardio eval appreciated    # Visual Disturbance  - CT Head w/ old infarcts  - On ASA and Statin   - Opthalmology eval appreciated    # Indigestion and Constipation   - PPI, miralax and senna continued  - Monitor BMs    # Anemia likely mixed AOCD vs iron deficiency   - HH stable in 9s on HSQ  - Anemia panel with AOCD   - Started on venofer, but ferritin high and now stopped   - Continue on EPO with HD  - Monitor HH   - Transfuse for Hgb < 8  - Maintain active T/S    # Diabetes Mellitus A1C 11.6   - Continue on lantus 10 with tradjecta 5 and ISS   - Diabetic DASH diet   - Monitor and adjust glucose levels PRN   - Endocrine following; F/u recs     # HLD and HLD  - Continue on lipitor and toprol  - Monitor BP    # DISPO TBD  - Palliative care called and patient remains FULL CODE     Discussed with Attending

## 2025-02-26 NOTE — PROGRESS NOTE ADULT - SUBJECTIVE AND OBJECTIVE BOX
[facility] NEPHROLOGY- PROGRESS NOTE    64y Female with history of CHF presents as a transfer for LE CO2 angiogram. Nephrology consulted for elevated Scr.    S/P LE angiogram on 2/24.    REVIEW OF SYSTEMS:  Gen: no fevers  Cards: no chest pain  Resp: no dyspnea  GI: no nausea or vomiting or diarrhea  Vascular: + LE edema    Augmentin (Stomach Upset; Vomiting; Nausea)  No Known Allergies      Hospital Medications: Medications reviewed      VITALS:  T(F): 97.9 (02-26-25 @ 04:07), Max: 99.1 (02-25-25 @ 21:51)  HR: 118 (02-26-25 @ 04:07)  BP: 103/60 (02-26-25 @ 04:07)  RR: 18 (02-26-25 @ 04:07)  SpO2: 95% (02-26-25 @ 04:07)  Wt(kg): --    02-25 @ 07:01  -  02-26 @ 07:00  --------------------------------------------------------  IN: 480 mL / OUT: 175 mL / NET: 305 mL        PHYSICAL EXAM:    Gen: NAD, calm  Cards: RRR, +S1/S2, no M/G/R  Resp: CTA B/L  GI: soft, NT/ND, NABS  : + stiles  Vascular: + LE wrapped B/L with RLE edema > LLE edema, + RIJ subclavian        LABS:  02-26    130[L]  |  88[L]  |  52[H]  ----------------------------<  105[H]  3.3[L]   |  23  |  3.39[H]    Ca    9.1      26 Feb 2025 05:57  Phos  3.1     02-25  Mg     1.9     02-25    TPro  8.3  /  Alb  3.2[L]  /  TBili  1.0  /  DBili      /  AST  19  /  ALT  33  /  AlkPhos  122[H]  02-25    Creatinine Trend: 3.39 <--, 2.65 <--, 2.34 <--, 2.12 <--, 1.89 <--, 3.02 <--, 3.38 <--, 4.76 <--                        9.1    13.60 )-----------( 254      ( 26 Feb 2025 05:57 )             25.6     Urine Studies:  Urinalysis Basic - ( 26 Feb 2025 05:57 )    Color:  / Appearance:  / SG:  / pH:   Gluc: 105 mg/dL / Ketone:   / Bili:  / Urobili:    Blood:  / Protein:  / Nitrite:    Leuk Esterase:  / RBC:  / WBC    Sq Epi:  / Non Sq Epi:  / Bacteria:

## 2025-02-26 NOTE — PROGRESS NOTE ADULT - PROBLEM SELECTOR PLAN 2
-c/w ASA  -cMRI with no viability  -per Dr. Fair, plan for high risk PCI -c/w ASA, plavix  -cMRI with no viability  -per Dr. Fair, plan for high risk PCI

## 2025-02-26 NOTE — PROGRESS NOTE ADULT - ASSESSMENT
64y.o. Female with PAD, CLTI affecting b/l LE, CHF, chronic b/l LE wound R worsen then the L, with R foot blistering and ulceration was transfer to [facility] for CO2 angiogram. Patient is currently followed by medicine and cardiology now s/p RLE angiogram with pop and AT angioplasty on 2/24/25.     PLAN:  - Plavix and aspirin, patient should be discharged with DAPT   - pt stable for d/c from vascular surgery standpoint  - Can follow up with Dr. Baltazar outpatient  - rest of care per primary    Vascular Surgery   [id number]

## 2025-02-26 NOTE — PROGRESS NOTE ADULT - SUBJECTIVE AND OBJECTIVE BOX
INTERNAL MEDICINE PROGRESS NOTE     NAME OF PATIENT: TOMASZ ALBA  MRN: [id number]  DATE OF VISIT: 02-26-25 @ 13:28    SUBJECTIVE/ ROS:  - Patient seen and examined by bedside   - Plan for HD today with mido 5 and EPO   - Plan for LHC tomorrow     OBJECTIVE:  ICU Vital Signs Last 24 Hrs  T(C): 37 (26 Feb 2025 11:06), Max: 37.3 (25 Feb 2025 21:51)  T(F): 98.6 (26 Feb 2025 11:06), Max: 99.1 (25 Feb 2025 21:51)  HR: 113 (26 Feb 2025 11:06) (113 - 118)  BP: 100/61 (26 Feb 2025 11:06) (98/59 - 108/65)  BP(mean): 72 (25 Feb 2025 21:51) (72 - 72)  ABP: --  ABP(mean): --  RR: 18 (26 Feb 2025 11:06) (18 - 18)  SpO2: 95% (26 Feb 2025 11:06) (91% - 95%)    O2 Parameters below as of 26 Feb 2025 11:06  Patient On (Oxygen Delivery Method): room air          02-26-25 @ 13:28  T(C): 37 (02-26-25 @ 11:06), Max: 37.3 (02-25-25 @ 21:51)  HR: 113 (02-26-25 @ 11:06) (113 - 118)  BP: 100/61 (02-26-25 @ 11:06) (98/59 - 108/65)  RR: 18 (02-26-25 @ 11:06) (18 - 18)  SpO2: 95% (02-26-25 @ 11:06) (91% - 95%)  Wt(kg): --  CAPILLARY BLOOD GLUCOSE      POCT Blood Glucose.: 146 mg/dL (26 Feb 2025 12:51)      HOSPITAL MEDICATIONS:  MEDICATIONS  (STANDING):  ampicillin/sulbactam  IVPB 3 Gram(s) IV Intermittent every 12 hours  ampicillin/sulbactam  IVPB      aspirin enteric coated 81 milliGRAM(s) Oral daily  atorvastatin 40 milliGRAM(s) Oral at bedtime  benzocaine/menthol Lozenge 1 Lozenge Oral once  bisacodyl 5 milliGRAM(s) Oral every 12 hours  buMETAnide IVPB 4 milliGRAM(s) IV Intermittent daily  chlorhexidine 4% Liquid 1 Application(s) Topical <User Schedule>  clopidogrel Tablet 75 milliGRAM(s) Oral daily  clopidogrel Tablet      dextrose 5%. 1000 milliLiter(s) (100 mL/Hr) IV Continuous <Continuous>  dextrose 5%. 1000 milliLiter(s) (50 mL/Hr) IV Continuous <Continuous>  dextrose 50% Injectable 25 Gram(s) IV Push once  dextrose 50% Injectable 12.5 Gram(s) IV Push once  dextrose 50% Injectable 25 Gram(s) IV Push once  epoetin day-epbx (RETACRIT) Injectable 8000 Unit(s) IV Push once  glucagon  Injectable 1 milliGRAM(s) IntraMuscular once  heparin   Injectable 5000 Unit(s) SubCutaneous every 8 hours  hydrALAZINE 25 milliGRAM(s) Oral three times a day  insulin glargine Injectable (LANTUS) 11 Unit(s) SubCutaneous at bedtime  insulin lispro (ADMELOG) corrective regimen sliding scale   SubCutaneous three times a day before meals  insulin lispro (ADMELOG) corrective regimen sliding scale   SubCutaneous at bedtime  insulin lispro Injectable (ADMELOG) 5 Unit(s) SubCutaneous three times a day with meals  isosorbide   dinitrate Tablet (ISORDIL) 5 milliGRAM(s) Oral three times a day  linagliptin 5 milliGRAM(s) Oral daily  midodrine 5 milliGRAM(s) Oral once  milrinone Infusion 0.25 MICROgram(s)/kG/Min (5.41 mL/Hr) IV Continuous <Continuous>  pantoprazole  Injectable 40 milliGRAM(s) IV Push daily  polyethylene glycol 3350 17 Gram(s) Oral daily  potassium chloride    Tablet ER 40 milliEquivalent(s) Oral once  senna 2 Tablet(s) Oral at bedtime    MEDICATIONS  (PRN):  acetaminophen     Tablet .. 650 milliGRAM(s) Oral every 6 hours PRN Mild Pain (1 - 3)  dextrose Oral Gel 15 Gram(s) Oral once PRN Blood Glucose LESS THAN 70 milliGRAM(s)/deciliter  HYDROmorphone  Injectable 1 milliGRAM(s) IV Push every 6 hours PRN Severe Pain (7 - 10)  melatonin 3 milliGRAM(s) Oral at bedtime PRN Insomnia  ondansetron Injectable 4 milliGRAM(s) IV Push every 6 hours PRN Nausea and/or Vomiting  sodium chloride 0.65% Nasal 1 Spray(s) Both Nostrils three times a day PRN Dryness  sodium chloride 0.9% lock flush 10 milliLiter(s) IV Push every 1 hour PRN Pre/post blood products, medications, blood draw, and to maintain line patency      PHYSICAL EXAMINATION:  General: NAD   Cardiology: S1/S2 with no murmur   Respiratory: CTA BL with no wheeze   GI: Soft and NTND  Extremities: BL LE edema noted. JOSE L of BL UE/LE. BL wound dressing present CDI.  Neurology: Awake with no acute neurological deficits     LABS:                        9.1    13.60 )-----------( 254      ( 26 Feb 2025 05:57 )             25.6     02-26    130[L]  |  88[L]  |  52[H]  ----------------------------<  105[H]  3.3[L]   |  23  |  3.39[H]    Ca    9.1      26 Feb 2025 05:57  Phos  3.1     02-25  Mg     1.8     02-26    TPro  8.3  /  Alb  3.2[L]  /  TBili  1.0  /  DBili  x   /  AST  19  /  ALT  33  /  AlkPhos  122[H]  02-25          MICROBIOLOGY:     RADIOLOGY:    CARDIOLOGY:

## 2025-02-26 NOTE — PROGRESS NOTE ADULT - NS ATTEST RISK PROBLEM GEN_ALL_CORE FT
preparation, patient care, and documentation for this visit, including the following: review of clinical lab tests; review of medical tests/procedures/services, obtaining and/or reviewing separately obtained history, counseling and educating the patient/family/caregiver, referring and communicating with other health care professionals (when not separately reported), documenting clinical information in the electronic health record, and care coordination (not separately reported).
Face-to-face encounter, review of extensive medical records in this and prior charts, laboratory findings, radiographic and imaging/diagnostic results; documentation as noted above and discussion of diagnostic impressions and plan with the patient and team.    Titration of vasoactive heart failure medications. High risk of decompensation
Face-to-face encounter, review of extensive medical records in this and prior charts, laboratory findings, radiographic and imaging/diagnostic results; documentation as noted above and discussion of diagnostic impressions and plan with the patient and team.    Titration of vasoactive heart failure medications. High risk of decompensation

## 2025-02-26 NOTE — PROGRESS NOTE ADULT - PROBLEM SELECTOR PLAN 1
-continue milrinone 0.25 for now  - continue to hold spironolactone  - minimal urine output with bumex IV, HD per nephro for now, watch for renal recovery  -check perfusion markers (LFTs, lactate) daily  -give IV iron sucrose x5 days  - not a candidate for advanced therapies given advanced CKD and PAD

## 2025-02-27 LAB
ALBUMIN SERPL ELPH-MCNC: 3.2 G/DL — LOW (ref 3.3–5)
ALP SERPL-CCNC: 112 U/L — SIGNIFICANT CHANGE UP (ref 40–120)
ALT FLD-CCNC: 28 U/L — SIGNIFICANT CHANGE UP (ref 10–45)
ANION GAP SERPL CALC-SCNC: 17 MMOL/L — SIGNIFICANT CHANGE UP (ref 5–17)
ANISOCYTOSIS BLD QL: SIGNIFICANT CHANGE UP
AST SERPL-CCNC: 20 U/L — SIGNIFICANT CHANGE UP (ref 10–40)
BASOPHILS # BLD AUTO: 0 K/UL — SIGNIFICANT CHANGE UP (ref 0–0.2)
BASOPHILS NFR BLD AUTO: 0 % — SIGNIFICANT CHANGE UP (ref 0–2)
BILIRUB SERPL-MCNC: 0.9 MG/DL — SIGNIFICANT CHANGE UP (ref 0.2–1.2)
BUN SERPL-MCNC: 33 MG/DL — HIGH (ref 7–23)
CALCIUM SERPL-MCNC: 8.9 MG/DL — SIGNIFICANT CHANGE UP (ref 8.4–10.5)
CHLORIDE SERPL-SCNC: 93 MMOL/L — LOW (ref 96–108)
CO2 SERPL-SCNC: 25 MMOL/L — SIGNIFICANT CHANGE UP (ref 22–31)
CREAT SERPL-MCNC: 3.12 MG/DL — HIGH (ref 0.5–1.3)
EGFR: 16 ML/MIN/1.73M2 — LOW
EGFR: 16 ML/MIN/1.73M2 — LOW
EOSINOPHIL # BLD AUTO: 0.39 K/UL — SIGNIFICANT CHANGE UP (ref 0–0.5)
EOSINOPHIL NFR BLD AUTO: 3 % — SIGNIFICANT CHANGE UP (ref 0–6)
GAS PNL BLDV: SIGNIFICANT CHANGE UP
GLUCOSE BLDC GLUCOMTR-MCNC: 127 MG/DL — HIGH (ref 70–99)
GLUCOSE BLDC GLUCOMTR-MCNC: 152 MG/DL — HIGH (ref 70–99)
GLUCOSE BLDC GLUCOMTR-MCNC: 164 MG/DL — HIGH (ref 70–99)
GLUCOSE BLDC GLUCOMTR-MCNC: 172 MG/DL — HIGH (ref 70–99)
GLUCOSE SERPL-MCNC: 153 MG/DL — HIGH (ref 70–99)
HCT VFR BLD CALC: 25.8 % — LOW (ref 34.5–45)
HGB BLD-MCNC: 8.9 G/DL — LOW (ref 11.5–15.5)
LYMPHOCYTES # BLD AUTO: 1.84 K/UL — SIGNIFICANT CHANGE UP (ref 1–3.3)
LYMPHOCYTES # BLD AUTO: 14 % — SIGNIFICANT CHANGE UP (ref 13–44)
MAGNESIUM SERPL-MCNC: 1.9 MG/DL — SIGNIFICANT CHANGE UP (ref 1.6–2.6)
MAGNESIUM SERPL-MCNC: 2 MG/DL — SIGNIFICANT CHANGE UP (ref 1.6–2.6)
MANUAL SMEAR VERIFICATION: SIGNIFICANT CHANGE UP
MCHC RBC-ENTMCNC: 25 PG — LOW (ref 27–34)
MCHC RBC-ENTMCNC: 34.5 G/DL — SIGNIFICANT CHANGE UP (ref 32–36)
MCV RBC AUTO: 72.5 FL — LOW (ref 80–100)
MICROCYTES BLD QL: SIGNIFICANT CHANGE UP
MONOCYTES # BLD AUTO: 0.66 K/UL — SIGNIFICANT CHANGE UP (ref 0–0.9)
MONOCYTES NFR BLD AUTO: 5 % — SIGNIFICANT CHANGE UP (ref 2–14)
NEUTROPHILS # BLD AUTO: 10.26 K/UL — HIGH (ref 1.8–7.4)
NEUTROPHILS NFR BLD AUTO: 78 % — HIGH (ref 43–77)
NRBC # BLD: 0 /100 WBCS — SIGNIFICANT CHANGE UP (ref 0–0)
NRBC BLD-RTO: 0 /100 WBCS — SIGNIFICANT CHANGE UP (ref 0–0)
PHOSPHATE SERPL-MCNC: 3 MG/DL — SIGNIFICANT CHANGE UP (ref 2.5–4.5)
PHOSPHATE SERPL-MCNC: 3.1 MG/DL — SIGNIFICANT CHANGE UP (ref 2.5–4.5)
PLAT MORPH BLD: NORMAL — SIGNIFICANT CHANGE UP
PLATELET # BLD AUTO: 268 K/UL — SIGNIFICANT CHANGE UP (ref 150–400)
POIKILOCYTOSIS BLD QL AUTO: SLIGHT — SIGNIFICANT CHANGE UP
POTASSIUM SERPL-MCNC: 3.9 MMOL/L — SIGNIFICANT CHANGE UP (ref 3.5–5.3)
POTASSIUM SERPL-MCNC: 3.9 MMOL/L — SIGNIFICANT CHANGE UP (ref 3.5–5.3)
POTASSIUM SERPL-SCNC: 3.9 MMOL/L — SIGNIFICANT CHANGE UP (ref 3.5–5.3)
POTASSIUM SERPL-SCNC: 3.9 MMOL/L — SIGNIFICANT CHANGE UP (ref 3.5–5.3)
PROT SERPL-MCNC: 7.9 G/DL — SIGNIFICANT CHANGE UP (ref 6–8.3)
RBC # BLD: 3.56 M/UL — LOW (ref 3.8–5.2)
RBC # FLD: 20.6 % — HIGH (ref 10.3–14.5)
RBC BLD AUTO: ABNORMAL
SCHISTOCYTES BLD QL AUTO: SLIGHT — SIGNIFICANT CHANGE UP
SODIUM SERPL-SCNC: 135 MMOL/L — SIGNIFICANT CHANGE UP (ref 135–145)
WBC # BLD: 13.15 K/UL — HIGH (ref 3.8–10.5)
WBC # FLD AUTO: 13.15 K/UL — HIGH (ref 3.8–10.5)

## 2025-02-27 PROCEDURE — 99232 SBSQ HOSP IP/OBS MODERATE 35: CPT

## 2025-02-27 PROCEDURE — 71045 X-RAY EXAM CHEST 1 VIEW: CPT | Mod: 26

## 2025-02-27 RX ADMIN — INSULIN LISPRO 5 UNIT(S): 100 INJECTION, SOLUTION INTRAVENOUS; SUBCUTANEOUS at 12:16

## 2025-02-27 RX ADMIN — CLOPIDOGREL BISULFATE 75 MILLIGRAM(S): 75 TABLET, FILM COATED ORAL at 12:16

## 2025-02-27 RX ADMIN — Medication 5 MILLIGRAM(S): at 17:31

## 2025-02-27 RX ADMIN — Medication 40 MILLIGRAM(S): at 12:17

## 2025-02-27 RX ADMIN — AMPICILLIN SODIUM AND SULBACTAM SODIUM 200 GRAM(S): 1; .5 INJECTION, POWDER, FOR SOLUTION INTRAMUSCULAR; INTRAVENOUS at 22:37

## 2025-02-27 RX ADMIN — POLYETHYLENE GLYCOL 3350 17 GRAM(S): 17 POWDER, FOR SOLUTION ORAL at 12:17

## 2025-02-27 RX ADMIN — Medication 1 MILLIGRAM(S): at 14:24

## 2025-02-27 RX ADMIN — Medication 1 MILLIGRAM(S): at 13:36

## 2025-02-27 RX ADMIN — HEPARIN SODIUM 5000 UNIT(S): 1000 INJECTION INTRAVENOUS; SUBCUTANEOUS at 12:16

## 2025-02-27 RX ADMIN — INSULIN LISPRO 1: 100 INJECTION, SOLUTION INTRAVENOUS; SUBCUTANEOUS at 08:15

## 2025-02-27 RX ADMIN — ISOSORBDIE DINITRATE 5 MILLIGRAM(S): 30 TABLET ORAL at 17:31

## 2025-02-27 RX ADMIN — ATORVASTATIN CALCIUM 40 MILLIGRAM(S): 80 TABLET, FILM COATED ORAL at 22:39

## 2025-02-27 RX ADMIN — ISOSORBDIE DINITRATE 5 MILLIGRAM(S): 30 TABLET ORAL at 06:28

## 2025-02-27 RX ADMIN — HEPARIN SODIUM 5000 UNIT(S): 1000 INJECTION INTRAVENOUS; SUBCUTANEOUS at 06:28

## 2025-02-27 RX ADMIN — Medication 1 APPLICATION(S): at 08:18

## 2025-02-27 RX ADMIN — AMPICILLIN SODIUM AND SULBACTAM SODIUM 200 GRAM(S): 1; .5 INJECTION, POWDER, FOR SOLUTION INTRAMUSCULAR; INTRAVENOUS at 13:35

## 2025-02-27 RX ADMIN — BUMETANIDE 132 MILLIGRAM(S): 1 TABLET ORAL at 06:28

## 2025-02-27 RX ADMIN — INSULIN LISPRO 5 UNIT(S): 100 INJECTION, SOLUTION INTRAVENOUS; SUBCUTANEOUS at 17:33

## 2025-02-27 RX ADMIN — Medication 81 MILLIGRAM(S): at 12:17

## 2025-02-27 RX ADMIN — LINAGLIPTIN 5 MILLIGRAM(S): 5 TABLET, FILM COATED ORAL at 12:17

## 2025-02-27 RX ADMIN — Medication 1 MILLIGRAM(S): at 22:39

## 2025-02-27 RX ADMIN — HEPARIN SODIUM 5000 UNIT(S): 1000 INJECTION INTRAVENOUS; SUBCUTANEOUS at 22:39

## 2025-02-27 RX ADMIN — INSULIN LISPRO 5 UNIT(S): 100 INJECTION, SOLUTION INTRAVENOUS; SUBCUTANEOUS at 08:15

## 2025-02-27 RX ADMIN — INSULIN GLARGINE-YFGN 11 UNIT(S): 100 INJECTION, SOLUTION SUBCUTANEOUS at 22:38

## 2025-02-27 RX ADMIN — Medication 1 MILLIGRAM(S): at 06:22

## 2025-02-27 RX ADMIN — INSULIN LISPRO 1: 100 INJECTION, SOLUTION INTRAVENOUS; SUBCUTANEOUS at 12:15

## 2025-02-27 NOTE — PROGRESS NOTE ADULT - SUBJECTIVE AND OBJECTIVE BOX
Patient seen today for follow up inpatient Diabetes Mellitus management.    Chief Complaint: Type 2 Diabetes Mellitus     INTERVAL HX:  Patient seen in [facility] 2DSU 241 W1. Patient is alert and oriented, resting in bed. Patient is feeling good, reports eating full meals and tolerating POs. FBG stable this am to 172mg/dL, 153mg/dL on blood serum. No hypoglycemia. Noted patient reports she did eat dinner but late because she had HD yesterday. BG have been stable and mostly at goal 100-180mg/dL while on a Consistent Carbohydrate Diet. Blood glucose levels in the last 24hrs have been 146-172mg/dL.     Review of Systems:  General: As above.  Respiratory: Denies any SOB, GARG, or cough.  Gastrointestinal: Denies any n/v/d or abdominal pain.   Endocrine: Denies any polyuria, polydipsia, polyphagia, visual changes, or numbness in feet.     Allergies  Augmentin (Stomach Upset; Vomiting; Nausea)  No Known Allergies      Intolerances  None.       MEDICATIONS  (STANDING):  acetaminophen     Tablet .. 650 milliGRAM(s) Oral every 6 hours PRN  ampicillin/sulbactam  IVPB 3 Gram(s) IV Intermittent every 12 hours  ampicillin/sulbactam  IVPB      aspirin enteric coated 81 milliGRAM(s) Oral daily  atorvastatin 40 milliGRAM(s) Oral at bedtime  benzocaine/menthol Lozenge 1 Lozenge Oral once  bisacodyl 5 milliGRAM(s) Oral every 12 hours  buMETAnide IVPB 4 milliGRAM(s) IV Intermittent daily  chlorhexidine 4% Liquid 1 Application(s) Topical <User Schedule>  clopidogrel Tablet 75 milliGRAM(s) Oral daily  clopidogrel Tablet      dextrose 5%. 1000 milliLiter(s) IV Continuous <Continuous>  dextrose 5%. 1000 milliLiter(s) IV Continuous <Continuous>  dextrose 50% Injectable 25 Gram(s) IV Push once  dextrose 50% Injectable 12.5 Gram(s) IV Push once  dextrose 50% Injectable 25 Gram(s) IV Push once  dextrose Oral Gel 15 Gram(s) Oral once PRN  glucagon  Injectable 1 milliGRAM(s) IntraMuscular once  heparin   Injectable 5000 Unit(s) SubCutaneous every 8 hours  hydrALAZINE 25 milliGRAM(s) Oral three times a day  HYDROmorphone  Injectable 1 milliGRAM(s) IV Push every 6 hours PRN  insulin glargine Injectable (LANTUS) 11 Unit(s) SubCutaneous at bedtime  insulin lispro (ADMELOG) corrective regimen sliding scale   SubCutaneous at bedtime  insulin lispro (ADMELOG) corrective regimen sliding scale   SubCutaneous three times a day before meals  insulin lispro Injectable (ADMELOG) 5 Unit(s) SubCutaneous three times a day with meals  isosorbide   dinitrate Tablet (ISORDIL) 5 milliGRAM(s) Oral three times a day  linagliptin 5 milliGRAM(s) Oral daily  magnesium sulfate  IVPB 2 Gram(s) IV Intermittent once  melatonin 3 milliGRAM(s) Oral at bedtime PRN  milrinone Infusion 0.25 MICROgram(s)/kG/Min IV Continuous <Continuous>  ondansetron Injectable 4 milliGRAM(s) IV Push every 6 hours PRN  pantoprazole  Injectable 40 milliGRAM(s) IV Push daily  polyethylene glycol 3350 17 Gram(s) Oral daily  senna 2 Tablet(s) Oral at bedtime  sodium chloride 0.65% Nasal 1 Spray(s) Both Nostrils three times a day PRN  sodium chloride 0.9% lock flush 10 milliLiter(s) IV Push every 1 hour PRN      atorvastatin 40 milliGRAM(s) Oral at bedtime  dextrose 50% Injectable 25 Gram(s) IV Push once  dextrose 50% Injectable 12.5 Gram(s) IV Push once  dextrose 50% Injectable 25 Gram(s) IV Push once  dextrose Oral Gel 15 Gram(s) Oral once PRN  glucagon  Injectable 1 milliGRAM(s) IntraMuscular once  insulin glargine Injectable (LANTUS) 11 Unit(s) SubCutaneous at bedtime  insulin lispro (ADMELOG) corrective regimen sliding scale   SubCutaneous at bedtime  insulin lispro (ADMELOG) corrective regimen sliding scale   SubCutaneous three times a day before meals  insulin lispro Injectable (ADMELOG) 5 Unit(s) SubCutaneous three times a day with meals  linagliptin 5 milliGRAM(s) Oral daily      insulin lispro (ADMELOG) corrective regimen sliding scale   SubCutaneous at bedtime  insulin lispro (ADMELOG) corrective regimen sliding scale   SubCutaneous three times a day before meals  insulin lispro Injectable (ADMELOG) 5 Unit(s) SubCutaneous three times a day with meals      PHYSICAL EXAM:  VITALS:   T(C): 36 (02-27-25 @ 04:02), Max: 37 (02-26-25 @ 11:06)  HR: 109 (02-27-25 @ 04:02) (101 - 117)  BP: 110/65 (02-27-25 @ 04:02) (61/61 - 110/65)  RR: 18 (02-27-25 @ 04:02) (18 - 18)  SpO2: 95% (02-27-25 @ 04:02) (92% - 95%)    GENERAL: In no acute distress  Respiratory: Respirations unlabored  Extremities: Warm and dry, no edema  NEURO: Alert and oriented, appropriate     LABS:  POCT Blood Glucose.: 172 mg/dL (02-27-25 @ 08:13)  POCT Blood Glucose.: 168 mg/dL (02-26-25 @ 21:58)  POCT Blood Glucose.: 146 mg/dL (02-26-25 @ 12:51)  POCT Blood Glucose.: 136 mg/dL (02-26-25 @ 08:44)  POCT Blood Glucose.: 96 mg/dL (02-25-25 @ 21:50)  POCT Blood Glucose.: 168 mg/dL (02-25-25 @ 17:09)  POCT Blood Glucose.: 278 mg/dL (02-25-25 @ 11:57)  POCT Blood Glucose.: 228 mg/dL (02-25-25 @ 07:46)  POCT Blood Glucose.: 353 mg/dL (02-24-25 @ 21:36)  POCT Blood Glucose.: 232 mg/dL (02-24-25 @ 16:50)  POCT Blood Glucose.: 139 mg/dL (02-24-25 @ 13:19)                          8.9    13.15 )-----------( 268      ( 27 Feb 2025 06:18 )             25.8     02-27    135  |  93[L]  |  33[H]  ----------------------------<  153[H]  3.9   |  25  |  3.12[H]    Ca    8.9      27 Feb 2025 06:18  Phos  3.1     02-27  Mg     2.0     02-27    TPro  7.9  /  Alb  3.2[L]  /  TBili  0.9  /  DBili  x   /  AST  20  /  ALT  28  /  AlkPhos  112  02-27    LIVER FUNCTIONS - ( 27 Feb 2025 06:18 )  Alb: 3.2 g/dL / Pro: 7.9 g/dL / ALK PHOS: 112 U/L / ALT: 28 U/L / AST: 20 U/L / GGT: x                 Urinalysis Basic - ( 27 Feb 2025 06:18 )    Color: x / Appearance: x / SG: x / pH: x  Gluc: 153 mg/dL / Ketone: x  / Bili: x / Urobili: x   Blood: x / Protein: x / Nitrite: x   Leuk Esterase: x / RBC: x / WBC x   Sq Epi: x / Non Sq Epi: x / Bacteria: x        A1C with Estimated Average Glucose Result: A1C with Estimated Average Glucose Result: 11.6 % (01-24-25 @ 05:40)  A1C with Estimated Average Glucose Result: >15.5 % (12-13-24 @ 06:49)

## 2025-02-27 NOTE — PROGRESS NOTE ADULT - SUBJECTIVE AND OBJECTIVE BOX
Name of Patient : TOMASZ ALBA  MRN: [id number]  Date of visit: 02-27-25       Subjective: Patient seen and examined. No new events except as noted.   doing okay       REVIEW OF SYSTEMS:    CONSTITUTIONAL: No weakness, fevers or chills  EYES/ENT: No visual changes;  No vertigo or throat pain   NECK: No pain or stiffness  RESPIRATORY: No cough, wheezing, hemoptysis; No shortness of breath  CARDIOVASCULAR: No chest pain or palpitations  GASTROINTESTINAL: No abdominal or epigastric pain. No nausea, vomiting, or hematemesis; No diarrhea or constipation. No melena or hematochezia.  GENITOURINARY: No dysuria, frequency or hematuria  NEUROLOGICAL: No numbness or weakness  SKIN: No itching, burning, rashes, or lesions   All other review of systems is negative unless indicated above.    MEDICATIONS:  MEDICATIONS  (STANDING):  ampicillin/sulbactam  IVPB 3 Gram(s) IV Intermittent every 12 hours  ampicillin/sulbactam  IVPB      aspirin enteric coated 81 milliGRAM(s) Oral daily  atorvastatin 40 milliGRAM(s) Oral at bedtime  benzocaine/menthol Lozenge 1 Lozenge Oral once  bisacodyl 5 milliGRAM(s) Oral every 12 hours  buMETAnide IVPB 4 milliGRAM(s) IV Intermittent daily  chlorhexidine 4% Liquid 1 Application(s) Topical <User Schedule>  clopidogrel Tablet 75 milliGRAM(s) Oral daily  clopidogrel Tablet      dextrose 5%. 1000 milliLiter(s) (50 mL/Hr) IV Continuous <Continuous>  dextrose 5%. 1000 milliLiter(s) (100 mL/Hr) IV Continuous <Continuous>  dextrose 50% Injectable 25 Gram(s) IV Push once  dextrose 50% Injectable 12.5 Gram(s) IV Push once  dextrose 50% Injectable 25 Gram(s) IV Push once  glucagon  Injectable 1 milliGRAM(s) IntraMuscular once  heparin   Injectable 5000 Unit(s) SubCutaneous every 8 hours  hydrALAZINE 25 milliGRAM(s) Oral three times a day  insulin glargine Injectable (LANTUS) 11 Unit(s) SubCutaneous at bedtime  insulin lispro (ADMELOG) corrective regimen sliding scale   SubCutaneous at bedtime  insulin lispro (ADMELOG) corrective regimen sliding scale   SubCutaneous three times a day before meals  insulin lispro Injectable (ADMELOG) 5 Unit(s) SubCutaneous three times a day with meals  isosorbide   dinitrate Tablet (ISORDIL) 5 milliGRAM(s) Oral three times a day  linagliptin 5 milliGRAM(s) Oral daily  magnesium sulfate  IVPB 2 Gram(s) IV Intermittent once  milrinone Infusion 0.25 MICROgram(s)/kG/Min (5.41 mL/Hr) IV Continuous <Continuous>  pantoprazole  Injectable 40 milliGRAM(s) IV Push daily  polyethylene glycol 3350 17 Gram(s) Oral daily  senna 2 Tablet(s) Oral at bedtime      PHYSICAL EXAM:  T(C): 37.2 (02-27-25 @ 11:01), Max: 37.2 (02-27-25 @ 11:01)  HR: 111 (02-27-25 @ 11:01) (101 - 117)  BP: 101/66 (02-27-25 @ 11:01) (61/61 - 110/65)  RR: 18 (02-27-25 @ 11:01) (18 - 18)  SpO2: 95% (02-27-25 @ 11:01) (92% - 95%)  Wt(kg): --  I&O's Summary    26 Feb 2025 07:01  -  27 Feb 2025 07:00  --------------------------------------------------------  IN: 120 mL / OUT: 1150 mL / NET: -1030 mL    27 Feb 2025 07:01  -  27 Feb 2025 14:35  --------------------------------------------------------  IN: 120 mL / OUT: 100 mL / NET: 20 mL          Appearance: Normal	  HEENT:  PERRLA   Lymphatic: No lymphadenopathy   Cardiovascular: Normal S1 S2, no JVD  Respiratory: normal effort , clear  Gastrointestinal:  Soft, Non-tender  Skin: No rashes,  warm to touch  Psychiatry:  Mood & affect appropriate  Musculuskeletal: No edema    recent labs, Imaging and EKGs personally reviewed     02-26-25 @ 07:01  -  02-27-25 @ 07:00  --------------------------------------------------------  IN: 120 mL / OUT: 1150 mL / NET: -1030 mL    02-27-25 @ 07:01  -  02-27-25 @ 14:35  --------------------------------------------------------  IN: 120 mL / OUT: 100 mL / NET: 20 mL                          8.9    13.15 )-----------( 268      ( 27 Feb 2025 06:18 )             25.8               02-27    135  |  93[L]  |  33[H]  ----------------------------<  153[H]  3.9   |  25  |  3.12[H]    Ca    8.9      27 Feb 2025 06:18  Phos  3.1     02-27  Mg     2.0     02-27    TPro  7.9  /  Alb  3.2[L]  /  TBili  0.9  /  DBili  x   /  AST  20  /  ALT  28  /  AlkPhos  112  02-27                       Urinalysis Basic - ( 27 Feb 2025 06:18 )    Color: x / Appearance: x / SG: x / pH: x  Gluc: 153 mg/dL / Ketone: x  / Bili: x / Urobili: x   Blood: x / Protein: x / Nitrite: x   Leuk Esterase: x / RBC: x / WBC x   Sq Epi: x / Non Sq Epi: x / Bacteria: x

## 2025-02-27 NOTE — PROGRESS NOTE ADULT - PROBLEM SELECTOR PLAN 1
-Primarily with exertion & when laying flat. Pt denies SOB at rest.  -Suspect 2nd to fluid overload, underlying CHF  -Keep O>I as tolerated  -VBG 2/14 acceptable  -ABX per primary team for LE wounds  -Keep sats >90% with O2 PRN (currently RA).  -Pt states SOB improving at this time, able to lay flat with no c/o SOB at this time  -Pt with increase in cough, check CXR

## 2025-02-27 NOTE — PROGRESS NOTE ADULT - ASSESSMENT
65 y/o F with PMH of CHF (last TTE 1/2025 with EF 30-35%), DM, diabetic foot ulcer, PAD, CAD. Recent admission at [facility] for CHF exacerbation, presented to [facility] as a transfer for worsening R leg pain. Hospital course c/b acute on chronic CHF - TTE with EF 20%, severely decreased LV and RV function, multiple regional motion abnormalities, s/p LHC revealing TVD, pleural/pericardial effusions, JAMEL, leukocytosis suspected to be in the setting of LE wound on IV ABX, persistent RLE pain w/ RLE Arterial Duplex revealing popliteal artery occlusion  Plan for eventual angiogram with vascular when medically optimized. Pulmonary called to consult as pt with c/o SOB.

## 2025-02-27 NOTE — PROGRESS NOTE ADULT - PROBLEM SELECTOR PLAN 1
Inpatient Plan:  - Check BG TID AC and HS while on PO diet  - C/w Lantus 11u QHS  - C/w Admelog 5u TID AC (HOLD if NPO)  - C/w oral Tradjenta 5mg once daily   - C/w low dose Admelog correctional scales TID AC and HS    Discharge Planning:   - Likely basal/bolus regimen given kidney function (doses TBD closer to d/c). Not a candidate for SGLT2 due to leg ulcers and also significantly decreased EGFR., can consider GLP1 in addition to Basal/bolus> will need close f/u with Optho due to h/o retinopathy.   Can send Rx for ozempic 0.25mg weekly x 4weeks and increase to 0.50mg, or mounjaro 2.5mg 2.5mg x4 weeks, then increase to 5.0mg weekly. (Patient denies medullary thyroid cancer, hx of pancreatitis or MEN2)  - Please make sure patient has DM management supplies (glucometer, lancets, strips, alcohol pads, insulin pen needles).  - Patient should check BGs before meals and at bedtime. Contact PCP/endocrinology if BG is less than 70 X1, greater than  400 X1 or persistently greater than 200s.   - Endocrine follow up: can f/u with Dr. Grace, Endocrinology.   - Needs Optho/ renal/cardiac /podiatry and vascular follow up

## 2025-02-27 NOTE — PROGRESS NOTE ADULT - ASSESSMENT
65 YO F with PMHx of HFrEF 30-35 and grade 2 ddfxn (last TTE on 01/16/25), DM2, and diabetic foot ulcer. Recent admission at [facility] for ADHF. Patient now represents to OSH and ultimately to [facility] as a transfer for worsening right leg pain and fluid secretion. As per documentation, patient was reported to have severe depletion of RLE blood flow beyond the popliteal vessel at knee and similar findings in the left. Patient was transferred to [facility] for CO2 angiogram with Dr. Baltazar. Of note, patient also with reported visual disturbance for which patient was seen and evaluated by opthalmology. Internal Medicine has been consulted on Ms. Kirby's care for medical management.     # ADHF/ BiV HFrEF 20   - Recent admission at [facility] for ADHF and on dobutamine outpatient  - TTE 1/16 with EF 30-35 with moderately reduced LVSF and grade 2 ddfxn, normal RVSF , trace TR/ ND, and trace pericardial effusion  - NST abnormal with small-sized, moderate defect in apical wall that is predominantly fixed suggestive of an infarction with minimal marcus-infarct ischemia. Post stress LVEF 25.  - CT Chest with small BL pleural effusions and small pericardial effusion.  - RPT TTE with EF 20, severely decreased LV, decreased RVSF with TAPSE 1.2, and regional wall motion abnormalities present with entire septum, entire apex, entire inferior wall, mid inferolateral segment, and mid anterolateral segment are hypokinetic.   - RHC with RA 20, PA 49/29 (40), PCWP 35, mVO2 51.1, CO/CI 3.8/2.2, SVR 1095 w/ Multivessel CAD   - s/p zaroxyln 10 QD  - CRE and sodium rising and bumex GTT stopped 2/18 and HTS stopped 2/16   - RPT limited TTE w/ LVSF severely decreased, reduced RVSF, LVOT VTI 12, mild to mod TR, and mildly elevated right atrial pressure  - Continue on milrinone gtt as per Cardio   - Continue on hydral 25 and isordil 5  - HF and cards following and adjusting medications   - Case discussed with EP and needs to be on GDMT for 3 months before AICD placement and should be stabilized off of inotropic support.   - Monitor I and O, diuresis per Cardio/ Renal    - GDMT as per Cardio -> on Hydralazine, Isosorbide   - Monitor volume status   - Check daily weights    - Monitor on telemetry; Monitor electrolytes   - Cardio, HF, Renal, and EP evals appreciated; F/u recs     # CAD with TVD   - [facility] with RCA , severe ostial LM disease, diffuse disease in LAD and LCx, some L to R collaterals (Multivessel CAD)  - Case discussed with CTSx and NOT a candidate for CABG due to comorbidities  - Cardiac MRI with greater than 75% subendocardial scarring of the basal to mid-inferior wall of the LV and 50% scarring of the left ventricular basal inferoseptal wall. There is also left ventricular transmural scarring involving the apical septal wall and likely near transmural scarring of the apical lateral wall.  - Plan for RPT [facility] with high risk PCI , plan for friday, discussed with intervention team   - AS and Statin   - Cardiology following     # BL LE Edema likely in setting of ADHF  - Diuresis as per Cardio, HF and Renal   - BL LE elevation and compression  - LE Duplex neg   - Monitor for now  - Podiatry and Vascular evals appreciated; F/u recs -->S/P angio w/ angioplasty with vascular, on DAPT     # Pericardial effusion likely in setting ADHF  - TTE with trace pericardial effusion   - CT Chest with small pericardial effusion   - Denies chest pain, palpitations, chest tightness or discomfort, shortness of breath or dyspnea   - Repeat TTE in 1 month for further evaluation   - Diuresis per cardio/ renal.  - Monitor on telemetry  - Cardio following    # Pleural effusion likely in setting ADHF  - CT Chest with small BL pleural effusions  - Monitor O2 saturation  - Supplement to maintain > 90%   - Diuresis per cardio/ renal.     # JAMEL with Hyponatremia and Metabolic Acidosis   - Baseline CRE 0.7-1.2 and increased to ~4  - As per OSH documentation, JAMEL was thought to be second to Unasyn/ Bumex / Entresto use and Hypotension   - US RENAL with no hydronephrosis  - Likely cardiorenal induced with low flow state in ADHF, however now rising again and concern for milrinone vs overdiuresis (prerenal) vs cardiorenal.   - Milrinone adjusted and diuresis turned off, however no improvement/ worsened in renal function noted.   - s/p shiley placement and started on HD 2/20  - c/w HD per renal   - c/w high dose bumex 4mg IV QD, however remains oliguric .   - HF and renal following   - Avoid nephrotoxic agents  - Monitor Cr and daily BMP     # Transaminitis  - Likely 2/2 hepatic congestion   - Volume removal with HD as tolerated  - Trend LFTs, if uptrend post-HD initiation, check ABD US  - Serial ABD Examinations    # Hypernatremia   - Hypernatremia likely from HTS vs over diuresis/ intravascular dehydration   - Hold further HTS   - Sodium improved   - Monitor sodium     # Leukocytosis likely in setting of BL LE ulcers with pop occlusion   - BCx negative   - UCx with probable contamination   - On unasyn for ABX. Frequency increased to Q12 as per Renal   - Leukocytosis fluctuating, however appears nontoxic with no fever spikes and monitoring closely on below unasyn.   - If febrile check pan cultures   - Trend CBC, temp curve, VS and adjust as tolerated    # Foot ulcers with worsening RLE pain second to pop artery occlusion +/- diabetic ulcers   - RLE Arterial Duplex with popliteal artery occlusion   - LLE Arterial Duplex with underlying disease cannot be excluded   - s/p angio with pop and AT VERA on 2/24   - Continue on Lipitor  - Continue on DAPT  - Continue pain control with oxy  - Wound care called for dressing recs   - Vascular following,     # Tachycardia likely pain related   - On Toprol and improved  - Continue to monitor on tele   - Cardio eval appreciated    # Visual Disturbance  - CT Head w/ old infarcts  - On ASA and Statin   - Opthalmology eval appreciated    # Indigestion and Constipation   - PPI, miralax and senna continued  - Monitor BMs    # Anemia likely mixed AOCD vs iron deficiency   - HH stable in 9s on HSQ  - Anemia panel with AOCD   - Started on venofer, but ferritin high and now stopped   - Continue on EPO with HD  - Monitor HH   - Transfuse for Hgb < 8  - Maintain active T/S    # Diabetes Mellitus A1C 11.6   - Continue on lantus 10 with tradjecta 5 and ISS   - Diabetic DASH diet   - Monitor and adjust glucose levels PRN   - Endocrine following; F/u recs     # HLD and HLD  - Continue on lipitor and toprol  - Monitor BP    # DISPO TBD  - Palliative care called and patient remains FULL CODE

## 2025-02-27 NOTE — PROGRESS NOTE ADULT - ASSESSMENT
64F, hx of CHF (last TTE on 1/16/25 EF 30-35%, G2DD), DM, diabetic foot ulcer [facility] 1/14-1/17 p/w worsening R. leg pain and fluid secretion, was meeting sepsis criteria with podiatry having low suspicion for right cellulitis and admitted for continued management of HF exacerbation and needing ischemic eval.     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  -Left leg Arterial Duplex: Slightly tardus parvus waveform of the left popliteal artery is noted, for which underlying disease cannot be excluded. Posterior tibial artery waveform is nonpulsatile. Anterior tibial artery tardus parvus flow is noted.   Transferred to [facility] for CO2 angiogram as per vascular surgery    #  PAD Wound of lower extremity.   ·  Plan: Podiatry following, b/l serous bullae, no concerns for infection  Found to have RLE coolness  -Right Leg Arterial Duplex: Popliteal artery is occluded with negligible flow of right trifurcation arteries.  -Left leg Arterial Duplex: Slightly tardus parvus waveform of the left popliteal artery is noted, for which underlying disease cannot be excluded. Posterior tibial artery waveform is nonpulsatile. Anterior tibial artery tardus parvus flow is noted.  -Started on heparin gtt and dobutamine gtt -Will transfer to [facility] for CO2 angiogram as per vascular surgery as not candidate for CTA due to worsening SCr  -Keep compression dressing  -LE elevation above heart level at rest.  -Transferred to [facility] for CO2 angiogram   - Overall this patient is at   intermediate  risk (for cardiac death, nonfatal myocardial infarction, and nonfatal cardiac arrest perioperatively for this intermediate  risk procedure).   No cardiac contraindications for CO2 vangiogram  There  are  no further recommendation for risk stratifying imaging/stress testing prior to planned surgery  Seen by Podiatry-No acute pod intervention, local wound care only-         PAD with rest ischemia  s/p AT/Popliteal angioplasty on DAPT        # HFrEF (congestive heart failure). Ischemic Cardiomyopathy  ·  Plan: Hx of HF, previous admission in January, on home Lasix 40 qD, Metoprolol Succ 25 qD. Not on Entresto due to insurance issues  Last TTE: LVSF moderately decreased w/ EF 30-35 %. Moderate G2DD. Mild MR  Stress test: small-sized, moderate defect(s) in the apical wall that is predominantly fixed suggestive of an infarction with minimal marcus-infarct ischemia  Ischemic cardiomyopathy  GDMT on hold 2/2 JAMEL    Repeat TTE EF 20% with WMA RAP~~8  Likely Ischemic cardiomyopathy despite NST revealing fixed defectLHC confirming  Multivessel CAD  Remains on Milrinone add Hydrall/isosorbide      RHC: RA 20, PA 49/29 (40), PCWP 35, mVO2 51.1, CO/CI 3.8/2.2, SVR 1095    # CAD  LHC-RCA , severe ostial LM disease, diffuse disease in LAD and LCx, some L to R collaterals-seen by CTS advise cMR for viability poor target vessels for CABG-?High risk LM/LAD PCI  Had cMR-LVEF is 25%, greater than 75% subendocardial scarring involving the basal to mid inferior wall of the left ventricle, left ventricular transmural scarring involving the apical septal wall and likely near transmural scarring of the apical lateral wall. 50% scarring of the left ventricular basal inferoseptal wall.  Will need High risk PCI vs CABG though less likely 2/2 poor target vessel    PLAN FOR High risk PCI    Started on Milrinone to assist augmented diuresis in attempt to avoid further renal failure  Has lost Weight 170---> 164 Kg---> 161.1 Kg---> 165 Kg-->151.2---> 155 Kg    Diuretic holiday  For HD catheter placement to initiate Dialysis  02/21-Started HD-500mL  02/22-HD 1000mL  02/22-For LE CO2 angiogram-No cardiac contraindications to proceeding with procedure  02/24-CO2 angiogram  02/25-s/p RLE angiogram with pop and AT angioplasty on DAPT  02/26-On Milrinone and Bumex tolerating Hyd/Isos Plan for possible High risk PCI-oLM/LAD    02/27 Had HD yesterday for High risk PCI tomorrow      LE EDEMA no DVT      # JAEML  Creatinine Trend: 3.39<--2.65<--2.34<--2.12<--, 1.89<- 3.02<--3.38<--(HD)-- 4.76<--4.07<---3.90<-- 1.17  Has had 3 sessions HD for interrmittent HD to allow contrast based procedures is non oliguric  Would dialyse today

## 2025-02-27 NOTE — PROGRESS NOTE ADULT - ASSESSMENT
65y/o F w/h/o uncontrolled T2DM (A1C 11.6%) on Tresiba and CeQur insulin patch PTA. DM c/b PAD, retinopathy, CKD, CAD. Also h/oType 2 DM, HTN, HLD. Presented to OSH with worsening right leg pain and fluid secretion. Transferred to [facility] for CO2 angiogram. Found to have Low EF to 20% in OSBALDO. Endocrine consulted for Type 2 DM management, uncontrolled. Patient reports feeling good, has been eating full meals and tolerating POs. FBG stable, no hypoglycemia. BGs have been stable and between BG goal inpatient, will keep insulin regimen as is for now. Patient to continue on oral Tradjenta 5mg once daily. Endocrine will closely monitor BG and adjust insulin as needed for BG goal 100-180mg/dL inpatient.       #uncontrolled Type 2 diabetes mellitus   A1C with Estimated Average Glucose Result: 11.6 % (01-24-25 @ 05:40)  A1C with Estimated Average Glucose Result: >15.5 % (12-13-24 @ 06:49)    Home regimen: Tresiba 40 units, CeQur insulin patch about 2-10 units TID with meals

## 2025-02-27 NOTE — PROGRESS NOTE ADULT - ASSESSMENT
64y Female with history of CHF presents as a transfer for LE CO2 angiogram. Nephrology consulted for elevated Scr.    1) JAMEL: likely due to ATN and CRS for which patient initiated on RRT on 2/20 with last HD on 2/26 with intradialytic hypotension and 1L removed. Will plan for HD post-LHC on 2/28. On milrinone gtt as per HF. Monitor for renal recovery. Avoid nephrotoxins.    2) HTN: BP low normal. Cardiac medications as per HF.    3) LE edema: On bumex 4 mg IV daily with poor UO. UF with HD. TTE with severely decreased LVSF. Monitor UO.     4) Anemia: Hb low with low TSAT. No IV iron given elevated ferritin. S/P Epo 8K X 1 dose on 2/26. Monitor Hb.      [facility] NEPHROLOGY  Raji Waterman M.D.  Mars Feliz D.O.  Kelley Parks M.D.  MD Nancy Kulkarni, MSN, ANP-C    Telephone: [phone]  Facsimile: [phone]    [facility] [place] [address], [address]  [place] [address]

## 2025-02-27 NOTE — PROGRESS NOTE ADULT - SUBJECTIVE AND OBJECTIVE BOX
[facility] NEPHROLOGY- PROGRESS NOTE    64y Female with history of CHF presents as a transfer for LE CO2 angiogram. Nephrology consulted for elevated Scr.      REVIEW OF SYSTEMS:  Gen: no fevers  Cards: no chest pain  Resp: no dyspnea  GI: no nausea or vomiting or diarrhea  Vascular: + LE edema improving    Augmentin (Stomach Upset; Vomiting; Nausea)  No Known Allergies      Hospital Medications: Medications reviewed        VITALS:  T(F): 98.9 (02-27-25 @ 11:01), Max: 98.9 (02-27-25 @ 11:01)  HR: 111 (02-27-25 @ 11:01)  BP: 101/66 (02-27-25 @ 11:01)  RR: 18 (02-27-25 @ 11:01)  SpO2: 95% (02-27-25 @ 11:01)  Wt(kg): --    02-26 @ 07:01  -  02-27 @ 07:00  --------------------------------------------------------  IN: 120 mL / OUT: 1150 mL / NET: -1030 mL    02-27 @ 07:01  -  02-27 @ 15:59  --------------------------------------------------------  IN: 120 mL / OUT: 100 mL / NET: 20 mL        PHYSICAL EXAM:    Gen: NAD, calm  Cards: RRR, +S1/S2, no M/G/R  Resp: CTA B/L  GI: soft, NT/ND, NABS  : + stiles  Vascular: + LE wrapped B/L with RLE edema > LLE edema, + RIJ subclavian        LABS:  02-27    135  |  93[L]  |  33[H]  ----------------------------<  153[H]  3.9   |  25  |  3.12[H]    Ca    8.9      27 Feb 2025 06:18  Phos  3.1     02-27  Mg     2.0     02-27    TPro  7.9  /  Alb  3.2[L]  /  TBili  0.9  /  DBili      /  AST  20  /  ALT  28  /  AlkPhos  112  02-27    Creatinine Trend: 3.12 <--, 3.39 <--, 2.65 <--, 2.34 <--, 2.12 <--, 1.89 <--, 3.02 <--, 3.38 <--                        8.9    13.15 )-----------( 268      ( 27 Feb 2025 06:18 )             25.8     Urine Studies:  Urinalysis Basic - ( 27 Feb 2025 06:18 )    Color:  / Appearance:  / SG:  / pH:   Gluc: 153 mg/dL / Ketone:   / Bili:  / Urobili:    Blood:  / Protein:  / Nitrite:    Leuk Esterase:  / RBC:  / WBC    Sq Epi:  / Non Sq Epi:  / Bacteria:

## 2025-02-27 NOTE — PROGRESS NOTE ADULT - SUBJECTIVE AND OBJECTIVE BOX
Follow-up Pulm Progress Note    c/o worsening cough  denies SOB/CP     Medications:  MEDICATIONS  (STANDING):  ampicillin/sulbactam  IVPB 3 Gram(s) IV Intermittent every 12 hours  ampicillin/sulbactam  IVPB      aspirin enteric coated 81 milliGRAM(s) Oral daily  atorvastatin 40 milliGRAM(s) Oral at bedtime  benzocaine/menthol Lozenge 1 Lozenge Oral once  bisacodyl 5 milliGRAM(s) Oral every 12 hours  buMETAnide IVPB 4 milliGRAM(s) IV Intermittent daily  chlorhexidine 4% Liquid 1 Application(s) Topical <User Schedule>  clopidogrel Tablet 75 milliGRAM(s) Oral daily  clopidogrel Tablet      dextrose 5%. 1000 milliLiter(s) (50 mL/Hr) IV Continuous <Continuous>  dextrose 5%. 1000 milliLiter(s) (100 mL/Hr) IV Continuous <Continuous>  dextrose 50% Injectable 25 Gram(s) IV Push once  dextrose 50% Injectable 12.5 Gram(s) IV Push once  dextrose 50% Injectable 25 Gram(s) IV Push once  glucagon  Injectable 1 milliGRAM(s) IntraMuscular once  heparin   Injectable 5000 Unit(s) SubCutaneous every 8 hours  hydrALAZINE 25 milliGRAM(s) Oral three times a day  insulin glargine Injectable (LANTUS) 11 Unit(s) SubCutaneous at bedtime  insulin lispro (ADMELOG) corrective regimen sliding scale   SubCutaneous at bedtime  insulin lispro (ADMELOG) corrective regimen sliding scale   SubCutaneous three times a day before meals  insulin lispro Injectable (ADMELOG) 5 Unit(s) SubCutaneous three times a day with meals  isosorbide   dinitrate Tablet (ISORDIL) 5 milliGRAM(s) Oral three times a day  linagliptin 5 milliGRAM(s) Oral daily  magnesium sulfate  IVPB 2 Gram(s) IV Intermittent once  milrinone Infusion 0.25 MICROgram(s)/kG/Min (5.41 mL/Hr) IV Continuous <Continuous>  pantoprazole  Injectable 40 milliGRAM(s) IV Push daily  polyethylene glycol 3350 17 Gram(s) Oral daily  senna 2 Tablet(s) Oral at bedtime    MEDICATIONS  (PRN):  acetaminophen     Tablet .. 650 milliGRAM(s) Oral every 6 hours PRN Mild Pain (1 - 3)  dextrose Oral Gel 15 Gram(s) Oral once PRN Blood Glucose LESS THAN 70 milliGRAM(s)/deciliter  HYDROmorphone  Injectable 1 milliGRAM(s) IV Push every 6 hours PRN Severe Pain (7 - 10)  melatonin 3 milliGRAM(s) Oral at bedtime PRN Insomnia  ondansetron Injectable 4 milliGRAM(s) IV Push every 6 hours PRN Nausea and/or Vomiting  sodium chloride 0.65% Nasal 1 Spray(s) Both Nostrils three times a day PRN Dryness  sodium chloride 0.9% lock flush 10 milliLiter(s) IV Push every 1 hour PRN Pre/post blood products, medications, blood draw, and to maintain line patency          Vital Signs Last 24 Hrs  T(C): 37.2 (27 Feb 2025 11:01), Max: 37.2 (27 Feb 2025 11:01)  T(F): 98.9 (27 Feb 2025 11:01), Max: 98.9 (27 Feb 2025 11:01)  HR: 111 (27 Feb 2025 11:01) (101 - 117)  BP: 101/66 (27 Feb 2025 11:01) (61/61 - 110/65)  BP(mean): 72 (26 Feb 2025 21:56) (72 - 72)  RR: 18 (27 Feb 2025 11:01) (18 - 18)  SpO2: 95% (27 Feb 2025 11:01) (92% - 95%)    Parameters below as of 27 Feb 2025 11:01  Patient On (Oxygen Delivery Method): room air          VBG pH 7.38 02-27 @ 06:20    VBG pCO2 44 02-27 @ 06:20    VBG O2 sat 70.2 02-27 @ 06:20    VBG lactate 2.0 02-27 @ 06:20      02-26 @ 07:01  -  02-27 @ 07:00  --------------------------------------------------------  IN: 120 mL / OUT: 1150 mL / NET: -1030 mL          LABS:                        8.9    13.15 )-----------( 268      ( 27 Feb 2025 06:18 )             25.8     02-27    135  |  93[L]  |  33[H]  ----------------------------<  153[H]  3.9   |  25  |  3.12[H]    Ca    8.9      27 Feb 2025 06:18  Phos  3.1     02-27  Mg     2.0     02-27    TPro  7.9  /  Alb  3.2[L]  /  TBili  0.9  /  DBili  x   /  AST  20  /  ALT  28  /  AlkPhos  112  02-27          CAPILLARY BLOOD GLUCOSE      POCT Blood Glucose.: 164 mg/dL (27 Feb 2025 11:41)      Urinalysis Basic - ( 27 Feb 2025 06:18 )    Color: x / Appearance: x / SG: x / pH: x  Gluc: 153 mg/dL / Ketone: x  / Bili: x / Urobili: x   Blood: x / Protein: x / Nitrite: x   Leuk Esterase: x / RBC: x / WBC x   Sq Epi: x / Non Sq Epi: x / Bacteria: x                Physical Examination:  PULM: coarse at bases   CVS: S1, S2 heard    RADIOLOGY REVIEWED  CXR: congestion   bibasilar atelectasis/effusions     CT chest:< from: CT Chest No Cont (01.25.25 @ 14:08) >  ACC: [id number] EXAM:  CT CHEST   ORDERED BY: TED MONROY     PROCEDURE DATE:  01/25/2025        INTERPRETATION:  Clinical information: Shortness of breath.    CT scan of the chest was obtained without administration of intravenous   contrast.    Several lymph nodes are present in the mediastinum.    Heart is enlarged in size. Calcification of the coronary arteries is   noted. Small pericardial effusion is noted.    No endobronchial lesions are noted. Patchy groundglass opacities noted   within both lungs are felt to be secondary to expiratory   technique/breathing artifact. Atelectasis is noted involving portions of   both lower lobes. This is secondary to small bilateral pleural effusions.    Below the diaphragm, visualized portions of the abdomen demonstrate   patient to be status post cholecystectomy.    Degenerative changes of the spine are noted.    IMPRESSION: Small bilateral pleural effusions and small pericardial   effusion.    --- End of Report ---    < end of copied text >

## 2025-02-27 NOTE — PROGRESS NOTE ADULT - SUBJECTIVE AND OBJECTIVE BOX
MR#[id number]  PATIENT NAME:COLE ALBA    DATE OF SERVICE: 25 @ 06:36  Patient was seen and examined by Yordy Gilmore MD on    25 @ 06:36 .  Interim events noted.Consultant notes ,Labs,Telemetry reviewed by me       HOSPITAL COURSE: HPI:  64F, hx of CHF (last TTE on 25 EF 30-35%, G2DD), DM, diabetic foot ulcer with a recent admission at [facility] for CHF exacerbation presented as a transfer for worsening R. leg pain and fluid secretion, On non-invasive imaging demonstrating severe depletion of RLE blood flow beyond the popliteal vessel at knee and similar findings in L. Was transferred to [facility] for CO2 angiogram with Dr. Baltazar. (2025 20:05)    -Had cath Multivessel CAD started on Milrinone for CTS evaluation albeit targets not optimal  02/15-Awake on Milrinone and Bumex gtt-seen by CTS not a surgical candidate-needs Vascular work-up for LE PAD-scheduled for Angiogram on Wednesday    Weight 170Kg---> 164 Kg--->161.1 Kg---151.2 ---> 155 --> 154.7   Kg  -Awake is off diuretics for cMR and CO2 angiogram  -Had cMD Plan for HD catheter placement to initiate Dialysis  -s/p HD catheter had HD with 500mL removal   -Awake c/o LE pain not dyspneac  -Awake Had HD last night 1000mL removed Plan for CO2 angiogram tomorrow  -Awake Plan for CO2 Angiogram today no dyspnea   -s/p RLE angiogram with pop and AT angioplasty   -Awake no dyspnea Was seen by Vascular no further intervention  To discuss with [employer] PCI LM/LAD-if planned will iHD prior to PCI    -Awake had iHD plan for High risk PCI oLM/LAD tomorrow 1000mL removed  Had ? pAF ~~3 secs      PMH -reviewed admission note, no change since admission  HEART FAILURE: Acute[x ]Chronic[ ] Systolic[x ] Diastolic[ ] Combined Systolic and Diastolic[ ]  CAD[x ] CABG[ ] PCI[ ]  DEVICES[ ] PPM[ ] ICD[ ] ILR[ ]  ATRIAL FIBRILLATION[ ] Paroxysmal[ ] Permanent[ ] CHADS2-[  ]  JAMEL[x ] CKD1[ ] CKD2[ ] CKD3[ ] CKD4[ ] ESRD[ ]  COPD[ ] HTN[x ]   DM[x ] Type1[ ] Type 2[ x]   CVA[ ] Paresis[ ]    AMBULATION: Assisted[x ] Cane/walker[ ] Independent[ ]          MEDICATIONS  (STANDING):  ampicillin/sulbactam  IVPB 3 Gram(s) IV Intermittent every 12 hours  ampicillin/sulbactam  IVPB      aspirin enteric coated 81 milliGRAM(s) Oral daily  atorvastatin 40 milliGRAM(s) Oral at bedtime  benzocaine/menthol Lozenge 1 Lozenge Oral once  bisacodyl 5 milliGRAM(s) Oral every 12 hours  buMETAnide IVPB 4 milliGRAM(s) IV Intermittent daily  chlorhexidine 4% Liquid 1 Application(s) Topical <User Schedule>  clopidogrel Tablet 75 milliGRAM(s) Oral daily  clopidogrel Tablet      glucagon  Injectable 1 milliGRAM(s) IntraMuscular once  heparin   Injectable 5000 Unit(s) SubCutaneous every 8 hours  hydrALAZINE 25 milliGRAM(s) Oral three times a day  insulin glargine Injectable (LANTUS) 11 Unit(s) SubCutaneous at bedtime  insulin lispro (ADMELOG) corrective regimen sliding scale   SubCutaneous at bedtime  insulin lispro (ADMELOG) corrective regimen sliding scale   SubCutaneous three times a day before meals  insulin lispro Injectable (ADMELOG) 5 Unit(s) SubCutaneous three times a day with meals  isosorbide   dinitrate Tablet (ISORDIL) 5 milliGRAM(s) Oral three times a day  linagliptin 5 milliGRAM(s) Oral daily  magnesium sulfate  IVPB 2 Gram(s) IV Intermittent once  milrinone Infusion 0.25 MICROgram(s)/kG/Min (5.41 mL/Hr) IV Continuous <Continuous>  pantoprazole  Injectable 40 milliGRAM(s) IV Push daily  polyethylene glycol 3350 17 Gram(s) Oral daily  senna 2 Tablet(s) Oral at bedtime    MEDICATIONS  (PRN):  acetaminophen     Tablet .. 650 milliGRAM(s) Oral every 6 hours PRN Mild Pain (1 - 3)  dextrose Oral Gel 15 Gram(s) Oral once PRN Blood Glucose LESS THAN 70 milliGRAM(s)/deciliter  HYDROmorphone  Injectable 1 milliGRAM(s) IV Push every 6 hours PRN Severe Pain (7 - 10)  melatonin 3 milliGRAM(s) Oral at bedtime PRN Insomnia  ondansetron Injectable 4 milliGRAM(s) IV Push every 6 hours PRN Nausea and/or Vomiting  sodium chloride 0.65% Nasal 1 Spray(s) Both Nostrils three times a day PRN Dryness  sodium chloride 0.9% lock flush 10 milliLiter(s) IV Push every 1 hour PRN Pre/post blood products, medications, blood draw, and to maintain line patency            REVIEW OF SYSTEMS:  Constitutional: [ ] fever, [ ]weight loss,  [x ]fatigue [ ]weight gain  Eyes: [ ] visual changes  Respiratory: [x]shortness of breath;  [ ] cough, [ ]wheezing, [ ]chills, [ ]hemoptysis  Cardiovascular: [ ] chest pain, [ ]palpitations, [ ]dizziness,  [ ]leg swelling[ ]orthopnea[ ]PND  Gastrointestinal: [ ] abdominal pain, [ ]nausea, [ ]vomiting,  [ ]diarrhea [ ]Constipation [ ]Melena  Genitourinary: [ ] dysuria, [ ] hematuria [ ][place]  Neurologic: [ ] headaches [ ] tremors[ ]weakness [ ]Paralysis Right[ ] Left[ ]  Skin: [ ] itching, [ ]burning, [ ] rashes  Endocrine: [ ] heat or cold intolerance  Musculoskeletal: [ ] joint pain or swelling; [ ] muscle, back, or extremity pain  Psychiatric: [ ] depression, [ ]anxiety, [ ]mood swings, or [ ]difficulty sleeping  Hematologic: [ ] easy bruising, [ ] bleeding gums    [ ] All remaining systems negative except as per above.   [ ]Unable to obtain.  [x] No change in ROS since admission      Vital Signs Last 24 Hrs  T(C): 36 (2025 04:02), Max: 37 (2025 11:06)  T(F): 96.8 (2025 04:02), Max: 98.6 (2025 11:06)  HR: 109 (2025 04:02) (101 - 117)  BP: 110/65 (2025 04:02) (61/61 - 110/65)  BP(mean): 72 (2025 21:56) (72 - 72)  RR: 18 (2025 04:02) (18 - 18)  SpO2: 95% (2025 04:02) (92% - 95%)    Parameters below as of 2025 04:02  Patient On (Oxygen Delivery Method): room air      I&O's Summary    2025 07:01  -  2025 07:00  --------------------------------------------------------  IN: 480 mL / OUT: 175 mL / NET: 305 mL    2025 07:01  -  2025 06:36  --------------------------------------------------------  IN: 120 mL / OUT: 1150 mL / NET: -1030 mL        PHYSICAL EXAM:  General: No acute distress BMI-28  HEENT: EOMI, PERRL  Neck: Supple, [ ] JVD  Lungs: Equal air entry bilaterally; [ ] rales [ ] wheezing [ ] rhonchi  Heart: Regular rate and rhythm; [x ] murmur   2/6 [ x] systolic [ ] diastolic [ ] radiation[ ] rubs [ ]  gallops  Abdomen: Nontender, bowel sounds present  Extremities: No clubbing, cyanosis, [x ] edema [ ]Pulses  equal and intact  Nervous system:  Alert & Oriented X3, no focal deficits  Psychiatric: Normal affect  Skin: No rashes or lesions    LABS:      x   |  x   |  x   ----------------------------<  x   3.9   |  x   |  x     Ca    9.1      2025 05:57  Phos  3.0       Mg     1.9         TPro  8.3  /  Alb  3.2[L]  /  TBili  1.0  /  DBili  x   /  AST  19  /  ALT  33  /  AlkPhos  122[H]      Creatinine Trend: 3.39<--, 2.65<--, 2.34<--, 2.12<--, 1.89<--, 3.02<--                        8.9    13.15 )-----------( 268      ( 2025 06:18 )             25.8         TTE Limited W or WO Ultrasound Enhancing Agent (25 @ 12:39) >  CONCLUSIONS:      1. Limited TTE to assess for MR, IVC, LVOT, TR.   2. Left ventricular systolic function is severely decreased.   3. Reduced right ventricular systolic function.   4. LVOT VTI 12.0cm, Stroke volume 35cc. LVOT diameter 1.9cm.   5. Mild to moderate tricuspid regurgitation.   6. Estimated pulmonary artery systolic pressure is 32 mmHg, consistent with normal pulmonary artery pressure.   7. The inferior vena cava is normal in size measuring 1.70 cm in diameter, (normal <2.1cm) with abnormal inspiratory collapse (abnormal <50%) consistent with mildly elevated right atrial pressure (~8, range 5-10mmHg).   8. Compared to the transthoracic echocardiogram performed on 2/10/2025, RV and LV function still .         Xray Chest 1 View AP/PA (25 @ 18:05) >    IMPRESSION:  No focal consolidations.  Mildpulmonary vascular congestion.        Cardiac Catheterization (25 @ 14:24) >  Diagnostic Conclusions:     Right dominant coronary anatomy with severe multivessel disease (high syntax score)  LM   Left main artery: There is a 70 % stenosis in the middle third portion of the segment.    LAD   Left anterior descending artery: Angiography shows severe atherosclerosis. There is a 90 % stenosis in the middle third portion of the segment.      CX   Circumflex: Angiography shows severe atherosclerosis. There is an 80 % stenosis in the ostium portion of the segment.    RCA   Right coronary artery: The distal vessel is supplied by limited collaterals from the Left anterior descending artery. There is a 100 % total occlusion stenosis in the proximal third portion of the segment.      Mild-moderate post capillary pulmonary hypertension, with reduced cardiac output/index  Elevated RA (22mmHg) and PCWP (35mmHg) pressures. Elevated V waves on  PCWP tracing (44mmHg)    MR Cardiac w/wo IV Cont (25 @ 09:44) >  IMPRESSION:.    The left ventricle demonstrates severe wall motion abnormalities and  function is depressed (calculated LVEF is 25%).    There is greater than 75% subendocardial scarring involving the basal to  mid inferior wall of the left ventricle.    There is left ventricular transmural scarring involving the apical septal  wall and likely near transmural scarring of the apical lateral wall.    There is about 50% scarring of the left ventricular basal inferoseptal  wall.

## 2025-02-28 LAB
GLUCOSE BLDC GLUCOMTR-MCNC: 108 MG/DL — HIGH (ref 70–99)
GLUCOSE BLDC GLUCOMTR-MCNC: 116 MG/DL — HIGH (ref 70–99)
GLUCOSE BLDC GLUCOMTR-MCNC: 136 MG/DL — HIGH (ref 70–99)
GLUCOSE BLDC GLUCOMTR-MCNC: 153 MG/DL — HIGH (ref 70–99)
MRSA PCR RESULT.: SIGNIFICANT CHANGE UP
S AUREUS DNA NOSE QL NAA+PROBE: SIGNIFICANT CHANGE UP

## 2025-02-28 PROCEDURE — 93458 L HRT ARTERY/VENTRICLE ANGIO: CPT | Mod: 26

## 2025-02-28 PROCEDURE — 99152 MOD SED SAME PHYS/QHP 5/>YRS: CPT

## 2025-02-28 RX ORDER — MIDODRINE HYDROCHLORIDE 5 MG/1
5 TABLET ORAL ONCE
Refills: 0 | Status: DISCONTINUED | OUTPATIENT
Start: 2025-02-28 | End: 2025-03-01

## 2025-02-28 RX ADMIN — INSULIN LISPRO 1: 100 INJECTION, SOLUTION INTRAVENOUS; SUBCUTANEOUS at 08:12

## 2025-02-28 RX ADMIN — AMPICILLIN SODIUM AND SULBACTAM SODIUM 200 GRAM(S): 1; .5 INJECTION, POWDER, FOR SOLUTION INTRAMUSCULAR; INTRAVENOUS at 11:56

## 2025-02-28 RX ADMIN — Medication 81 MILLIGRAM(S): at 11:56

## 2025-02-28 RX ADMIN — CLOPIDOGREL BISULFATE 75 MILLIGRAM(S): 75 TABLET, FILM COATED ORAL at 11:56

## 2025-02-28 RX ADMIN — Medication 40 MILLIGRAM(S): at 11:57

## 2025-02-28 RX ADMIN — Medication 1 MILLIGRAM(S): at 21:45

## 2025-02-28 RX ADMIN — MILRINONE LACTATE 5.41 MICROGRAM(S)/KG/MIN: 1 INJECTION, SOLUTION INTRAVENOUS at 05:19

## 2025-02-28 RX ADMIN — Medication 1 APPLICATION(S): at 08:14

## 2025-02-28 RX ADMIN — HEPARIN SODIUM 5000 UNIT(S): 1000 INJECTION INTRAVENOUS; SUBCUTANEOUS at 23:38

## 2025-02-28 RX ADMIN — Medication 1 MILLIGRAM(S): at 05:19

## 2025-02-28 RX ADMIN — MILRINONE LACTATE 5.41 MICROGRAM(S)/KG/MIN: 1 INJECTION, SOLUTION INTRAVENOUS at 08:14

## 2025-02-28 RX ADMIN — Medication 1 MILLIGRAM(S): at 20:43

## 2025-02-28 RX ADMIN — Medication 2 TABLET(S): at 23:35

## 2025-02-28 RX ADMIN — HEPARIN SODIUM 5000 UNIT(S): 1000 INJECTION INTRAVENOUS; SUBCUTANEOUS at 05:26

## 2025-02-28 RX ADMIN — MILRINONE LACTATE 5.41 MICROGRAM(S)/KG/MIN: 1 INJECTION, SOLUTION INTRAVENOUS at 11:58

## 2025-02-28 RX ADMIN — Medication 3 MILLIGRAM(S): at 23:36

## 2025-02-28 RX ADMIN — Medication 650 MILLIGRAM(S): at 23:39

## 2025-02-28 RX ADMIN — INSULIN LISPRO 5 UNIT(S): 100 INJECTION, SOLUTION INTRAVENOUS; SUBCUTANEOUS at 08:13

## 2025-02-28 RX ADMIN — BUMETANIDE 132 MILLIGRAM(S): 1 TABLET ORAL at 05:21

## 2025-02-28 RX ADMIN — LINAGLIPTIN 5 MILLIGRAM(S): 5 TABLET, FILM COATED ORAL at 11:57

## 2025-02-28 RX ADMIN — MILRINONE LACTATE 5.41 MICROGRAM(S)/KG/MIN: 1 INJECTION, SOLUTION INTRAVENOUS at 23:40

## 2025-02-28 RX ADMIN — ATORVASTATIN CALCIUM 40 MILLIGRAM(S): 80 TABLET, FILM COATED ORAL at 23:36

## 2025-02-28 NOTE — PROGRESS NOTE ADULT - ASSESSMENT
64y Female with history of CHF presents as a transfer for LE CO2 angiogram. Nephrology consulted for elevated Scr.    1) JAMEL: likely due to ATN and CRS for which patient initiated on RRT on 2/20 with last HD on 2/26 with intradialytic hypotension and 1L removed. Will consider RRT post-LHC pending LVEDP. On milrinone gtt as per HF. Monitor for renal recovery. Avoid nephrotoxins.    2) HTN: BP low normal. Decrease Hydralazine to 10 mg PO TID to avoid intradialytic hypotension.    3) LE edema: On bumex 4 mg IV daily with poor UO. UF with HD. TTE with severely decreased LVSF. Monitor UO.     4) Anemia: Hb low with low TSAT. No IV iron given elevated ferritin. S/P Epo 8K X 1 dose on 2/26. Monitor Hb.      [facility] NEPHROLOGY  Raji Waterman M.D.  Mars Feliz D.O.  Kelley Parks M.D.  MD Nancy Kulkarni, MSN, ANP-C    Telephone: [phone]  Facsimile: [phone]    [address] [place] [address], [address]  [place] [address]

## 2025-02-28 NOTE — PROGRESS NOTE ADULT - SUBJECTIVE AND OBJECTIVE BOX
MR#[id number]  PATIENT NAME:COLE ALBA    DATE OF SERVICE: 25 @ 06:52  Patient was seen and examined by Yordy Gilmore MD on    25 @ 06:52 .  Interim events noted.Consultant notes ,Labs,Telemetry reviewed by me       HOSPITAL COURSE: HPI:  64F, hx of CHF (last TTE on 25 EF 30-35%, G2DD), DM, diabetic foot ulcer with a recent admission at [facility] for CHF exacerbation presented as a transfer for worsening R. leg pain and fluid secretion, On non-invasive imaging demonstrating severe depletion of RLE blood flow beyond the popliteal vessel at knee and similar findings in L. Was transferred to [facility] for CO2 angiogram with Dr. Baltazar. (2025 20:05)      INTERIM EVENTS:Patient seen at bedside ,interim events noted.  -Had cath Multivessel CAD started on Milrinone for CTS evaluation albeit targets not optimal  02/15-Awake on Milrinone and Bumex gtt-seen by CTS not a surgical candidate-needs Vascular work-up for LE PAD-scheduled for Angiogram on Wednesday    Weight 170Kg---> 164 Kg--->161.1 Kg---151.2 ---> 155 --> 154.7 ---> 158  Kg    -s/p RLE angiogram with pop and AT angioplasty   -Awake had iHD plan for High risk PCI oLM/LAD tomorrow 1000mL removed  Had ? pAF ~~3 secs    -Awake no dyspnea chest pain plan for PCI-oLM/LAD todat HD after PCI-Sinus rhythm    PMH -reviewed admission note, no change since admission  HEART FAILURE: Acute[ ]Chronic[ ] Systolic[ ] Diastolic[ ] Combined Systolic and Diastolic[ ]  CAD[ ] CABG[ ] PCI[ ]  DEVICES[ ] PPM[ ] ICD[ ] ILR[ ]  ATRIAL FIBRILLATION[ ] Paroxysmal[ ] Permanent[ ] CHADS2-[  ]  JAMEL[ ] CKD1[ ] CKD2[ ] CKD3[ ] CKD4[ ] ESRD[ ]  COPD[ ] HTN[ ]   DM[ ] Type1[ ] Type 2[ ]   CVA[ ] Paresis[ ]    AMBULATION: Assisted[ ] Cane/walker[ ] Independent[ ]    MEDICATIONS  (STANDING):  ampicillin/sulbactam  IVPB 3 Gram(s) IV Intermittent every 12 hours  ampicillin/sulbactam  IVPB      aspirin enteric coated 81 milliGRAM(s) Oral daily  atorvastatin 40 milliGRAM(s) Oral at bedtime  benzocaine/menthol Lozenge 1 Lozenge Oral once  bisacodyl 5 milliGRAM(s) Oral every 12 hours  buMETAnide IVPB 4 milliGRAM(s) IV Intermittent daily  chlorhexidine 4% Liquid 1 Application(s) Topical <User Schedule>  clopidogrel Tablet 75 milliGRAM(s) Oral daily  clopidogrel Tablet      glucagon  Injectable 1 milliGRAM(s) IntraMuscular once  heparin   Injectable 5000 Unit(s) SubCutaneous every 8 hours  hydrALAZINE 25 milliGRAM(s) Oral three times a day  insulin glargine Injectable (LANTUS) 11 Unit(s) SubCutaneous at bedtime  insulin lispro (ADMELOG) corrective regimen sliding scale   SubCutaneous at bedtime  insulin lispro (ADMELOG) corrective regimen sliding scale   SubCutaneous three times a day before meals  insulin lispro Injectable (ADMELOG) 5 Unit(s) SubCutaneous three times a day with meals  isosorbide   dinitrate Tablet (ISORDIL) 5 milliGRAM(s) Oral three times a day  linagliptin 5 milliGRAM(s) Oral daily  milrinone Infusion 0.25 MICROgram(s)/kG/Min (5.41 mL/Hr) IV Continuous <Continuous>  pantoprazole  Injectable 40 milliGRAM(s) IV Push daily  polyethylene glycol 3350 17 Gram(s) Oral daily  senna 2 Tablet(s) Oral at bedtime    MEDICATIONS  (PRN):  acetaminophen     Tablet .. 650 milliGRAM(s) Oral every 6 hours PRN Mild Pain (1 - 3)  dextrose Oral Gel 15 Gram(s) Oral once PRN Blood Glucose LESS THAN 70 milliGRAM(s)/deciliter  HYDROmorphone  Injectable 1 milliGRAM(s) IV Push every 6 hours PRN Severe Pain (7 - 10)  melatonin 3 milliGRAM(s) Oral at bedtime PRN Insomnia  ondansetron Injectable 4 milliGRAM(s) IV Push every 6 hours PRN Nausea and/or Vomiting  sodium chloride 0.65% Nasal 1 Spray(s) Both Nostrils three times a day PRN Dryness  sodium chloride 0.9% lock flush 10 milliLiter(s) IV Push every 1 hour PRN Pre/post blood products, medications, blood draw, and to maintain line patency            REVIEW OF SYSTEMS:  Constitutional: [ ] fever, [ ]weight loss,  [x ]fatigue [ ]weight gain  Eyes: [ ] visual changes  Respiratory: [ ]shortness of breath;  [ ] cough, [ ]wheezing, [ ]chills, [ ]hemoptysis  Cardiovascular: [ ] chest pain, [ ]palpitations, [ ]dizziness,  [ ]leg swelling[ ]orthopnea[ ]PND  Gastrointestinal: [ ] abdominal pain, [ ]nausea, [ ]vomiting,  [ ]diarrhea [ ]Constipation [ ]Melena  Genitourinary: [ ] dysuria, [ ] hematuria [ ][place]  Neurologic: [ ] headaches [ ] tremors[ ]weakness [ ]Paralysis Right[ ] Left[ ]  Skin: [ ] itching, [ ]burning, [ ] rashes  Endocrine: [ ] heat or cold intolerance  Musculoskeletal: [ ] joint pain or swelling; [ ] muscle, back, or extremity pain  Psychiatric: [ ] depression, [ ]anxiety, [ ]mood swings, or [ ]difficulty sleeping  Hematologic: [ ] easy bruising, [ ] bleeding gums    [ ] All remaining systems negative except as per above.   [ ]Unable to obtain.  [x] No change in ROS since admission      Vital Signs Last 24 Hrs  T(C): 36.8 (2025 04:26), Max: 37.2 (2025 11:01)  T(F): 98.2 (2025 04:26), Max: 98.9 (2025 11:01)  HR: 113 (2025 04:26) (111 - 117)  BP: 105/62 (2025 04:26) (96/57 - 105/62)  BP(mean): 70 (2025 20:47) (70 - 70)  RR: 18 (2025 04:26) (18 - 18)  SpO2: 92% (2025 04:26) (91% - 95%)    Parameters below as of 2025 04:26  Patient On (Oxygen Delivery Method): room air      I&O's Summary    2025 07:01  -  2025 07:00  --------------------------------------------------------  IN: 120 mL / OUT: 1150 mL / NET: -1030 mL    2025 07:01  -  2025 06:52  --------------------------------------------------------  IN: 240 mL / OUT: 275 mL / NET: -35 mL        PHYSICAL EXAM:  General: No acute distress BMI-28  HEENT: EOMI, PERRL  Neck: Supple, [ ] JVD  Lungs: Equal air entry bilaterally; [ ] rales [ ] wheezing [ ] rhonchi  Heart: Regular rate and rhythm; [x ] murmur   2/6 [ x] systolic [ ] diastolic [ ] radiation[ ] rubs [ ]  gallops  Abdomen: Nontender, bowel sounds present  Extremities: No clubbing, cyanosis, [ ] edema [ x]]Warm, well perfused        RLE: Dressings in place, blistering and ulceration on the dorsal aspect of the foot        LLE: First digit dry gangrene on the plantar aspect  Nervous system:  Alert & Oriented X3, no focal deficits  Psychiatric: Normal affect  Skin: No rashes or lesions    LABS:      135  |  93[L]  |  33[H]  ----------------------------<  153[H]  3.9   |  25  |  3.12[H]    Ca    8.9      2025 06:18  Phos  3.1       Mg     2.0         TPro  7.9  /  Alb  3.2[L]  /  TBili  0.9  /  DBili  x   /  AST  20  /  ALT  28  /  AlkPhos  112      Creatinine Trend: 3.12<--, 3.39<--, 2.65<--, 2.34<--, 2.12<--, 1.89<--                        8.9    13.15 )-----------( 268      ( 2025 06:18 )             25.8           TTE Limited W or WO Ultrasound Enhancing Agent (25 @ 12:39) >  CONCLUSIONS:      1. Limited TTE to assess for MR, IVC, LVOT, TR.   2. Left ventricular systolic function is severely decreased.   3. Reduced right ventricular systolic function.   4. LVOT VTI 12.0cm, Stroke volume 35cc. LVOT diameter 1.9cm.   5. Mild to moderate tricuspid regurgitation.   6. Estimated pulmonary artery systolic pressure is 32 mmHg, consistent with normal pulmonary artery pressure.   7. The inferior vena cava is normal in size measuring 1.70 cm in diameter, (normal <2.1cm) with abnormal inspiratory collapse (abnormal <50%) consistent with mildly elevated right atrial pressure (~8, range 5-10mmHg).   8. Compared to the transthoracic echocardiogram performed on 2/10/2025, RV and LV function still .         Xray Chest 1 View AP/PA (25 @ 18:05) >    IMPRESSION:  No focal consolidations.  Mildpulmonary vascular congestion.        Cardiac Catheterization (25 @ 14:24) >  Diagnostic Conclusions:     Right dominant coronary anatomy with severe multivessel disease (high syntax score)  LM   Left main artery: There is a 70 % stenosis in the middle third portion of the segment.    LAD   Left anterior descending artery: Angiography shows severe atherosclerosis. There is a 90 % stenosis in the middle third portion of the segment.      CX   Circumflex: Angiography shows severe atherosclerosis. There is an 80 % stenosis in the ostium portion of the segment.    RCA   Right coronary artery: The distal vessel is supplied by limited collaterals from the Left anterior descending artery. There is a 100 % total occlusion stenosis in the proximal third portion of the segment.      Mild-moderate post capillary pulmonary hypertension, with reduced cardiac output/index  Elevated RA (22mmHg) and PCWP (35mmHg) pressures. Elevated V waves on  PCWP tracing (44mmHg)    MR Cardiac w/wo IV Cont (25 @ 09:44) >  IMPRESSION:.    The left ventricle demonstrates severe wall motion abnormalities and  function is depressed (calculated LVEF is 25%).    There is greater than 75% subendocardial scarring involving the basal to  mid inferior wall of the left ventricle.    There is left ventricular transmural scarring involving the apical septal  wall and likely near transmural scarring of the apical lateral wall.    There is about 50% scarring of the left ventricular basal inferoseptal  wall.

## 2025-02-28 NOTE — PROGRESS NOTE ADULT - ASSESSMENT
63 YO F with PMHx of HFrEF 30-35 and grade 2 ddfxn (last TTE on 01/16/25), DM2, and diabetic foot ulcer. Recent admission at [facility] for ADHF. Patient now represents to OSH and ultimately to [facility] as a transfer for worsening right leg pain and fluid secretion. As per documentation, patient was reported to have severe depletion of RLE blood flow beyond the popliteal vessel at knee and similar findings in the left. Patient was transferred to [facility] for CO2 angiogram with Dr. Baltazar. Of note, patient also with reported visual disturbance for which patient was seen and evaluated by opthalmology. Internal Medicine has been consulted on Ms. Kirby's care for medical management.     # ADHF/ BiV HFrEF 20   - Recent admission at [facility] for ADHF and on dobutamine outpatient  - TTE 1/16 with EF 30-35 with moderately reduced LVSF and grade 2 ddfxn, normal RVSF , trace TR/ AZ, and trace pericardial effusion  - NST abnormal with small-sized, moderate defect in apical wall that is predominantly fixed suggestive of an infarction with minimal marcus-infarct ischemia. Post stress LVEF 25.  - CT Chest with small BL pleural effusions and small pericardial effusion.  - RPT TTE with EF 20, severely decreased LV, decreased RVSF with TAPSE 1.2, and regional wall motion abnormalities present with entire septum, entire apex, entire inferior wall, mid inferolateral segment, and mid anterolateral segment are hypokinetic.   - RHC with RA 20, PA 49/29 (40), PCWP 35, mVO2 51.1, CO/CI 3.8/2.2, SVR 1095 w/ Multivessel CAD   - s/p zaroxyln 10 QD  - CRE and sodium rising and bumex GTT stopped 2/18 and HTS stopped 2/16   - RPT limited TTE w/ LVSF severely decreased, reduced RVSF, LVOT VTI 12, mild to mod TR, and mildly elevated right atrial pressure  - Continue on milrinone gtt as per Cardio   - Continue on hydral 25 and isordil 5  - HF and cards following and adjusting medications   - Case discussed with EP and needs to be on GDMT for 3 months before AICD placement and should be stabilized off of inotropic support.   - Monitor I and O, diuresis per Cardio/ Renal    - GDMT as per Cardio -> on Hydralazine, Isosorbide   - Monitor volume status   - Check daily weights    - Monitor on telemetry; Monitor electrolytes   - Cardio, HF, Renal, and EP evals appreciated; F/u recs     # CAD with TVD   - [facility] with RCA , severe ostial LM disease, diffuse disease in LAD and LCx, some L to R collaterals (Multivessel CAD)  - Case discussed with CTSx and NOT a candidate for CABG due to comorbidities  - Cardiac MRI with greater than 75% subendocardial scarring of the basal to mid-inferior wall of the LV and 50% scarring of the left ventricular basal inferoseptal wall. There is also left ventricular transmural scarring involving the apical septal wall and likely near transmural scarring of the apical lateral wall.  - Plan for RPT [facility] with high risk PCI , plan for today   - AS and Statin   - Cardiology following     # BL LE Edema likely in setting of ADHF  - Diuresis as per Cardio, HF and Renal   - BL LE elevation and compression  - LE Duplex neg   - Monitor for now  - Podiatry and Vascular evals appreciated; F/u recs -->S/P angio w/ angioplasty with vascular, on DAPT     # Pericardial effusion likely in setting ADHF  - TTE with trace pericardial effusion   - CT Chest with small pericardial effusion   - Denies chest pain, palpitations, chest tightness or discomfort, shortness of breath or dyspnea   - Repeat TTE in 1 month for further evaluation   - Diuresis per cardio/ renal.  - Monitor on telemetry  - Cardio following    # Pleural effusion likely in setting ADHF  - CT Chest with small BL pleural effusions  - Monitor O2 saturation  - Supplement to maintain > 90%   - Diuresis per cardio/ renal.     # JAMEL with Hyponatremia and Metabolic Acidosis   - Baseline CRE 0.7-1.2 and increased to ~4  - As per OSH documentation, JAMEL was thought to be second to Unasyn/ Bumex / Entresto use and Hypotension   - US RENAL with no hydronephrosis  - Likely cardiorenal induced with low flow state in ADHF, however now rising again and concern for milrinone vs overdiuresis (prerenal) vs cardiorenal.   - Milrinone adjusted and diuresis turned off, however no improvement/ worsened in renal function noted.   - s/p shiley placement and started on HD 2/20  - c/w HD per renal   - c/w high dose bumex 4mg IV QD, however remains oliguric .   - HF and renal following   - Avoid nephrotoxic agents  - Monitor Cr and daily BMP     # Transaminitis  - Likely 2/2 hepatic congestion   - Volume removal with HD as tolerated  - Trend LFTs, if uptrend post-HD initiation, check ABD US  - Serial ABD Examinations    # Hypernatremia   - Hypernatremia likely from HTS vs over diuresis/ intravascular dehydration   - Hold further HTS   - Sodium improved   - Monitor sodium     # Leukocytosis likely in setting of BL LE ulcers with pop occlusion   - BCx negative   - UCx with probable contamination   - On unasyn for ABX. Frequency increased to Q12 as per Renal   - Leukocytosis fluctuating, however appears nontoxic with no fever spikes and monitoring closely on below unasyn.   - If febrile check pan cultures   - Trend CBC, temp curve, VS and adjust as tolerated    # Foot ulcers with worsening RLE pain second to pop artery occlusion +/- diabetic ulcers   - RLE Arterial Duplex with popliteal artery occlusion   - LLE Arterial Duplex with underlying disease cannot be excluded   - s/p angio with pop and AT VERA on 2/24   - Continue on Lipitor  - Continue on DAPT  - Continue pain control with oxy  - Wound care called for dressing recs   - Vascular following,     # Tachycardia likely pain related   - On Toprol and improved  - Continue to monitor on tele   - Cardio eval appreciated    # Visual Disturbance  - CT Head w/ old infarcts  - On ASA and Statin   - Opthalmology eval appreciated    # Indigestion and Constipation   - PPI, miralax and senna continued  - Monitor BMs    # Anemia likely mixed AOCD vs iron deficiency   - HH stable in 9s on HSQ  - Anemia panel with AOCD   - Started on venofer, but ferritin high and now stopped   - Continue on EPO with HD  - Monitor HH   - Transfuse for Hgb < 8  - Maintain active T/S    # Diabetes Mellitus A1C 11.6   - Continue on lantus 10 with tradjecta 5 and ISS   - Diabetic DASH diet   - Monitor and adjust glucose levels PRN   - Endocrine following; F/u recs     # HLD and HLD  - Continue on lipitor and toprol  - Monitor BP    # DISPO TBD  - Palliative care called and patient remains FULL CODE

## 2025-02-28 NOTE — PROGRESS NOTE ADULT - ASSESSMENT
64F, hx of CHF (last TTE on 1/16/25 EF 30-35%, G2DD), DM, diabetic foot ulcer with a recent admission at [facility] for CHF exacerbation 1/14-1/17 p/w worsening R. leg pain and fluid secretion, was meeting sepsis criteria with podiatry having low suspicion for right cellulitis and admitted for continued management of HF exacerbation and needing ischemic eval.     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  -Left leg Arterial Duplex: Slightly tardus parvus waveform of the left popliteal artery is noted, for which underlying disease cannot be excluded. Posterior tibial artery waveform is nonpulsatile. Anterior tibial artery tardus parvus flow is noted.   Transferred to [facility] for CO2 angiogram as per vascular surgery    #  PAD Wound of lower extremity.   ·  Plan: Podiatry following, b/l serous bullae, no concerns for infection  Found to have RLE coolness  -Right Leg Arterial Duplex: Popliteal artery is occluded with negligible flow of right trifurcation arteries.  -Left leg Arterial Duplex: Slightly tardus parvus waveform of the left popliteal artery is noted, for which underlying disease cannot be excluded. Posterior tibial artery waveform is nonpulsatile. Anterior tibial artery tardus parvus flow is noted.  -Started on heparin gtt and dobutamine gtt -Will transfer to [facility] for CO2 angiogram as per vascular surgery as not candidate for CTA due to worsening SCr  -Keep compression dressing  -LE elevation above heart level at rest.  -Transferred to [facility] for CO2 angiogram   - Overall this patient is at   intermediate  risk (for cardiac death, nonfatal myocardial infarction, and nonfatal cardiac arrest perioperatively for this intermediate  risk procedure).   No cardiac contraindications for CO2 vangiogram  There  are  no further recommendation for risk stratifying imaging/stress testing prior to planned surgery  Seen by Podiatry-No acute pod intervention, local wound care only-         PAD with rest ischemia  s/p AT/Popliteal angioplasty on DAPT        # HFrEF (congestive heart failure). Ischemic Cardiomyopathy  ·  Plan: Hx of HF, previous admission in January, on home Lasix 40 qD, Metoprolol Succ 25 qD. Not on Entresto due to insurance issues  Last TTE: LVSF moderately decreased w/ EF 30-35 %. Moderate G2DD. Mild MR  Stress test: small-sized, moderate defect(s) in the apical wall that is predominantly fixed suggestive of an infarction with minimal marcus-infarct ischemia  Ischemic cardiomyopathy  GDMT on hold 2/2 JAMEL    Repeat TTE EF 20% with WMA RAP~~8  Likely Ischemic cardiomyopathy despite NST revealing fixed defectLHC confirming  Multivessel CAD  Remains on Milrinone add Hydrall/isosorbide      RHC: RA 20, PA 49/29 (40), PCWP 35, mVO2 51.1, CO/CI 3.8/2.2, SVR 1095    # CAD  LHC-RCA , severe ostial LM disease, diffuse disease in LAD and LCx, some L to R collaterals-seen by CTS advise cMR for viability poor target vessels for CABG-?High risk LM/LAD PCI  Had cMR-LVEF is 25%, greater than 75% subendocardial scarring involving the basal to mid inferior wall of the left ventricle, left ventricular transmural scarring involving the apical septal wall and likely near transmural scarring of the apical lateral wall. 50% scarring of the left ventricular basal inferoseptal wall.  Will need High risk PCI vs CABG though less likely 2/2 poor target vessel    PLAN FOR High risk PCI today Dr Fair    Started on Milrinone to assist augmented diuresis in attempt to avoid further renal failure  Has lost Weight 170---> 164 Kg---> 161.1 Kg---> 165 Kg-->151.2---> 155 ---> 158 Kg    Diuretic holiday  For HD catheter placement to initiate Dialysis  02/21-Started HD-500mL  02/22-HD 1000mL  02/22-For LE CO2 angiogram-No cardiac contraindications to proceeding with procedure  02/24-CO2 angiogram  02/25-s/p RLE angiogram with pop and AT angioplasty on DAPT  02/26-On Milrinone and Bumex tolerating Hyd/Isos Plan for possible High risk PCI-oLM/LAD    02/27 Had HD yesterday for High risk PCI tomorrow      LE EDEMA no DVT      # JAMEL  Creatinine Trend: 3.39<--2.65<--2.34<--2.12<--, 1.89<- 3.02<--3.38<--(HD)-- 4.76<--4.07<---3.90<-- 1.17  Has had 3 sessions HD for interrmittent HD to allow contrast based procedures is non oliguric  Would dialyse today

## 2025-02-28 NOTE — PROGRESS NOTE ADULT - SUBJECTIVE AND OBJECTIVE BOX
[facility] NEPHROLOGY- PROGRESS NOTE    64y Female with history of CHF presents as a transfer for LE CO2 angiogram. Nephrology consulted for elevated Scr.      REVIEW OF SYSTEMS:  Gen: no fevers  Cards: no chest pain  Resp: no dyspnea  GI: no nausea or vomiting or diarrhea  Vascular: + LE edema improving    Augmentin (Stomach Upset; Vomiting; Nausea)  No Known Allergies      Hospital Medications: Medications reviewed        VITALS:  T(F): 97.6 (02-28-25 @ 11:32), Max: 98.2 (02-28-25 @ 04:26)  HR: 109 (02-28-25 @ 11:32)  BP: 94/57 (02-28-25 @ 11:32)  RR: 18 (02-28-25 @ 11:32)  SpO2: 95% (02-28-25 @ 11:32)  Wt(kg): --    02-27 @ 07:01  -  02-28 @ 07:00  --------------------------------------------------------  IN: 240 mL / OUT: 275 mL / NET: -35 mL          PHYSICAL EXAM:    Gen: NAD, calm  Cards: RRR, +S1/S2, no M/G/R  Resp: CTA B/L  GI: soft, NT/ND, NABS  : + stiles  Vascular: + LE wrapped B/L with RLE edema > LLE edema, + RIJ subclavian        LABS:  02-27    135  |  93[L]  |  33[H]  ----------------------------<  153[H]  3.9   |  25  |  3.12[H]    Ca    8.9      27 Feb 2025 06:18  Phos  3.1     02-27  Mg     2.0     02-27    TPro  7.9  /  Alb  3.2[L]  /  TBili  0.9  /  DBili      /  AST  20  /  ALT  28  /  AlkPhos  112  02-27    Creatinine Trend: 3.12 <--, 3.39 <--, 2.65 <--, 2.34 <--, 2.12 <--, 1.89 <--, 3.02 <--                        8.9    13.15 )-----------( 268      ( 27 Feb 2025 06:18 )             25.8     Urine Studies:  Urinalysis Basic - ( 27 Feb 2025 06:18 )    Color:  / Appearance:  / SG:  / pH:   Gluc: 153 mg/dL / Ketone:   / Bili:  / Urobili:    Blood:  / Protein:  / Nitrite:    Leuk Esterase:  / RBC:  / WBC    Sq Epi:  / Non Sq Epi:  / Bacteria:

## 2025-02-28 NOTE — PROGRESS NOTE ADULT - PROBLEM SELECTOR PLAN 1
-Primarily with exertion & when laying flat. Pt denies SOB at rest.  -Suspect 2nd to fluid overload, underlying CHF  -Keep O>I as tolerated  -VBG 2/14 acceptable  -ABX per primary team for LE wounds  -Keep sats >90% with O2 PRN (currently RA).  -Pt states SOB improving at this time, able to lay flat with no c/o SOB at this time  -Pt with increase in cough yesterday, CXR with some congestion. Keep O>I as tolerated, incentive spirometry use encouraged.

## 2025-02-28 NOTE — PROGRESS NOTE ADULT - SUBJECTIVE AND OBJECTIVE BOX
Follow-up Pulm Progress Note    No new respiratory events overnight.  Denies SOB/CP.   cough a little better today     Medications:  MEDICATIONS  (STANDING):  ampicillin/sulbactam  IVPB 3 Gram(s) IV Intermittent every 12 hours  ampicillin/sulbactam  IVPB      aspirin enteric coated 81 milliGRAM(s) Oral daily  atorvastatin 40 milliGRAM(s) Oral at bedtime  benzocaine/menthol Lozenge 1 Lozenge Oral once  bisacodyl 5 milliGRAM(s) Oral every 12 hours  buMETAnide IVPB 4 milliGRAM(s) IV Intermittent daily  chlorhexidine 4% Liquid 1 Application(s) Topical <User Schedule>  clopidogrel Tablet 75 milliGRAM(s) Oral daily  clopidogrel Tablet      dextrose 5%. 1000 milliLiter(s) (100 mL/Hr) IV Continuous <Continuous>  dextrose 5%. 1000 milliLiter(s) (50 mL/Hr) IV Continuous <Continuous>  dextrose 50% Injectable 25 Gram(s) IV Push once  dextrose 50% Injectable 12.5 Gram(s) IV Push once  dextrose 50% Injectable 25 Gram(s) IV Push once  glucagon  Injectable 1 milliGRAM(s) IntraMuscular once  heparin   Injectable 5000 Unit(s) SubCutaneous every 8 hours  hydrALAZINE 10 milliGRAM(s) Oral three times a day  insulin glargine Injectable (LANTUS) 11 Unit(s) SubCutaneous at bedtime  insulin lispro (ADMELOG) corrective regimen sliding scale   SubCutaneous three times a day before meals  insulin lispro (ADMELOG) corrective regimen sliding scale   SubCutaneous at bedtime  insulin lispro Injectable (ADMELOG) 5 Unit(s) SubCutaneous three times a day with meals  isosorbide   dinitrate Tablet (ISORDIL) 5 milliGRAM(s) Oral three times a day  linagliptin 5 milliGRAM(s) Oral daily  milrinone Infusion 0.25 MICROgram(s)/kG/Min (5.41 mL/Hr) IV Continuous <Continuous>  pantoprazole  Injectable 40 milliGRAM(s) IV Push daily  polyethylene glycol 3350 17 Gram(s) Oral daily  senna 2 Tablet(s) Oral at bedtime    MEDICATIONS  (PRN):  acetaminophen     Tablet .. 650 milliGRAM(s) Oral every 6 hours PRN Mild Pain (1 - 3)  dextrose Oral Gel 15 Gram(s) Oral once PRN Blood Glucose LESS THAN 70 milliGRAM(s)/deciliter  HYDROmorphone  Injectable 1 milliGRAM(s) IV Push every 6 hours PRN Severe Pain (7 - 10)  melatonin 3 milliGRAM(s) Oral at bedtime PRN Insomnia  ondansetron Injectable 4 milliGRAM(s) IV Push every 6 hours PRN Nausea and/or Vomiting  sodium chloride 0.65% Nasal 1 Spray(s) Both Nostrils three times a day PRN Dryness  sodium chloride 0.9% lock flush 10 milliLiter(s) IV Push every 1 hour PRN Pre/post blood products, medications, blood draw, and to maintain line patency          Vital Signs Last 24 Hrs  T(C): 36.4 (28 Feb 2025 11:32), Max: 36.8 (28 Feb 2025 04:26)  T(F): 97.6 (28 Feb 2025 11:32), Max: 98.2 (28 Feb 2025 04:26)  HR: 109 (28 Feb 2025 11:32) (109 - 117)  BP: 94/57 (28 Feb 2025 11:32) (94/57 - 105/62)  BP(mean): 70 (27 Feb 2025 20:47) (70 - 70)  RR: 18 (28 Feb 2025 11:32) (18 - 18)  SpO2: 95% (28 Feb 2025 11:32) (91% - 95%)    Parameters below as of 28 Feb 2025 11:32  Patient On (Oxygen Delivery Method): room air          VBG pH 7.38 02-27 @ 06:20    VBG pCO2 44 02-27 @ 06:20    VBG O2 sat 70.2 02-27 @ 06:20    VBG lactate 2.0 02-27 @ 06:20      02-27 @ 07:01  -  02-28 @ 07:00  --------------------------------------------------------  IN: 240 mL / OUT: 275 mL / NET: -35 mL          LABS:                        8.9    13.15 )-----------( 268      ( 27 Feb 2025 06:18 )             25.8     02-27    135  |  93[L]  |  33[H]  ----------------------------<  153[H]  3.9   |  25  |  3.12[H]    Ca    8.9      27 Feb 2025 06:18  Phos  3.1     02-27  Mg     2.0     02-27    TPro  7.9  /  Alb  3.2[L]  /  TBili  0.9  /  DBili  x   /  AST  20  /  ALT  28  /  AlkPhos  112  02-27          CAPILLARY BLOOD GLUCOSE      POCT Blood Glucose.: 108 mg/dL (28 Feb 2025 11:52)      Urinalysis Basic - ( 27 Feb 2025 06:18 )    Color: x / Appearance: x / SG: x / pH: x  Gluc: 153 mg/dL / Ketone: x  / Bili: x / Urobili: x   Blood: x / Protein: x / Nitrite: x   Leuk Esterase: x / RBC: x / WBC x   Sq Epi: x / Non Sq Epi: x / Bacteria: x            Physical Examination:  PULM: coarse at bases   CVS: S1, S2 heard    RADIOLOGY REVIEWED  CXR: congestion   bibasilar atelectasis/effusions     CT chest:< from: CT Chest No Cont (01.25.25 @ 14:08) >  ACC: [id number] EXAM:  CT CHEST   ORDERED BY: TED MONROY     PROCEDURE DATE:  01/25/2025        INTERPRETATION:  Clinical information: Shortness of breath.    CT scan of the chest was obtained without administration of intravenous   contrast.    Several lymph nodes are present in the mediastinum.    Heart is enlarged in size. Calcification of the coronary arteries is   noted. Small pericardial effusion is noted.    No endobronchial lesions are noted. Patchy groundglass opacities noted   within both lungs are felt to be secondary to expiratory   technique/breathing artifact. Atelectasis is noted involving portions of   both lower lobes. This is secondary to small bilateral pleural effusions.    Below the diaphragm, visualized portions of the abdomen demonstrate   patient to be status post cholecystectomy.    Degenerative changes of the spine are noted.    IMPRESSION: Small bilateral pleural effusions and small pericardial   effusion.    --- End of Report ---    < end of copied text >

## 2025-02-28 NOTE — PROGRESS NOTE ADULT - SUBJECTIVE AND OBJECTIVE BOX
Name of Patient : TOMASZ ALBA  MRN: [id number]  Date of visit: 02-28-25       Subjective: Patient seen and examined. No new events except as noted.   doing okay  plan for cath today     REVIEW OF SYSTEMS:    CONSTITUTIONAL: No weakness, fevers or chills  EYES/ENT: No visual changes;  No vertigo or throat pain   NECK: No pain or stiffness  RESPIRATORY: No cough, wheezing, hemoptysis; No shortness of breath  CARDIOVASCULAR: No chest pain or palpitations  GASTROINTESTINAL: No abdominal or epigastric pain. No nausea, vomiting, or hematemesis; No diarrhea or constipation. No melena or hematochezia.  GENITOURINARY: No dysuria, frequency or hematuria  NEUROLOGICAL: No numbness or weakness  SKIN: No itching, burning, rashes, or lesions   All other review of systems is negative unless indicated above.    MEDICATIONS:  MEDICATIONS  (STANDING):  aspirin enteric coated 81 milliGRAM(s) Oral daily  atorvastatin 40 milliGRAM(s) Oral at bedtime  benzocaine/menthol Lozenge 1 Lozenge Oral once  bisacodyl 5 milliGRAM(s) Oral every 12 hours  buMETAnide IVPB 4 milliGRAM(s) IV Intermittent daily  chlorhexidine 4% Liquid 1 Application(s) Topical <User Schedule>  clopidogrel Tablet 75 milliGRAM(s) Oral daily  clopidogrel Tablet      dextrose 5%. 1000 milliLiter(s) (100 mL/Hr) IV Continuous <Continuous>  dextrose 5%. 1000 milliLiter(s) (50 mL/Hr) IV Continuous <Continuous>  dextrose 50% Injectable 25 Gram(s) IV Push once  dextrose 50% Injectable 12.5 Gram(s) IV Push once  dextrose 50% Injectable 25 Gram(s) IV Push once  glucagon  Injectable 1 milliGRAM(s) IntraMuscular once  heparin   Injectable 5000 Unit(s) SubCutaneous every 8 hours  hydrALAZINE 10 milliGRAM(s) Oral three times a day  insulin glargine Injectable (LANTUS) 11 Unit(s) SubCutaneous at bedtime  insulin lispro (ADMELOG) corrective regimen sliding scale   SubCutaneous three times a day before meals  insulin lispro (ADMELOG) corrective regimen sliding scale   SubCutaneous at bedtime  insulin lispro Injectable (ADMELOG) 5 Unit(s) SubCutaneous three times a day with meals  isosorbide   dinitrate Tablet (ISORDIL) 5 milliGRAM(s) Oral three times a day  linagliptin 5 milliGRAM(s) Oral daily  midodrine. 5 milliGRAM(s) Oral once  milrinone Infusion 0.25 MICROgram(s)/kG/Min (5.41 mL/Hr) IV Continuous <Continuous>  pantoprazole  Injectable 40 milliGRAM(s) IV Push daily  polyethylene glycol 3350 17 Gram(s) Oral daily  senna 2 Tablet(s) Oral at bedtime      PHYSICAL EXAM:  T(C): 36.8 (02-28-25 @ 20:35), Max: 36.8 (02-28-25 @ 04:26)  HR: 111 (02-28-25 @ 20:35) (104 - 113)  BP: 101/57 (02-28-25 @ 20:35) (94/57 - 118/62)  RR: 18 (02-28-25 @ 20:35) (18 - 19)  SpO2: 95% (02-28-25 @ 20:35) (90% - 95%)  Wt(kg): --  I&O's Summary    27 Feb 2025 07:01  -  28 Feb 2025 07:00  --------------------------------------------------------  IN: 240 mL / OUT: 275 mL / NET: -35 mL    28 Feb 2025 07:01  -  28 Feb 2025 21:05  --------------------------------------------------------  IN: 120 mL / OUT: 325 mL / NET: -205 mL          Appearance: Normal	  HEENT:  PERRLA   Lymphatic: No lymphadenopathy   Cardiovascular: Normal S1 S2, no JVD  Respiratory: normal effort , clear  Gastrointestinal:  Soft, Non-tender  Skin: No rashes,  warm to touch  Psychiatry:  Mood & affect appropriate  Musculuskeletal: No edema    recent labs, Imaging and EKGs personally reviewed     02-27-25 @ 07:01  -  02-28-25 @ 07:00  --------------------------------------------------------  IN: 240 mL / OUT: 275 mL / NET: -35 mL    02-28-25 @ 07:01  -  02-28-25 @ 21:05  --------------------------------------------------------  IN: 120 mL / OUT: 325 mL / NET: -205 mL                          8.9    13.15 )-----------( 268      ( 27 Feb 2025 06:18 )             25.8               02-27    135  |  93[L]  |  33[H]  ----------------------------<  153[H]  3.9   |  25  |  3.12[H]    Ca    8.9      27 Feb 2025 06:18  Phos  3.1     02-27  Mg     2.0     02-27    TPro  7.9  /  Alb  3.2[L]  /  TBili  0.9  /  DBili  x   /  AST  20  /  ALT  28  /  AlkPhos  112  02-27                       Urinalysis Basic - ( 27 Feb 2025 06:18 )    Color: x / Appearance: x / SG: x / pH: x  Gluc: 153 mg/dL / Ketone: x  / Bili: x / Urobili: x   Blood: x / Protein: x / Nitrite: x   Leuk Esterase: x / RBC: x / WBC x   Sq Epi: x / Non Sq Epi: x / Bacteria: x

## 2025-02-28 NOTE — PROGRESS NOTE ADULT - ASSESSMENT
63 y/o F with PMH of CHF (last TTE 1/2025 with EF 30-35%), DM, diabetic foot ulcer, PAD, CAD. Recent admission at [facility] for CHF exacerbation, presented to [facility] as a transfer for worsening R leg pain. Hospital course c/b acute on chronic CHF - TTE with EF 20%, severely decreased LV and RV function, multiple regional motion abnormalities, s/p LHC revealing TVD, pleural/pericardial effusions, JAMEL, leukocytosis suspected to be in the setting of LE wound on IV ABX, persistent RLE pain w/ RLE Arterial Duplex revealing popliteal artery occlusion  Plan for eventual angiogram with vascular when medically optimized. Pulmonary called to consult as pt with c/o SOB.

## 2025-02-28 NOTE — PROGRESS NOTE ADULT - PROBLEM SELECTOR PLAN 2
show no signs or symptoms of venous thromboembolism  Description  Will show no signs or symptoms of venous thromboembolism  7/16/2019 1940 by Roosevelt Fabian  Outcome: Ongoing  7/16/2019 0959 by eKiry Gonzalez RN  Outcome: Ongoing  Goal: Absence of signs or symptoms of impaired coagulation  Description  Absence of signs or symptoms of impaired coagulation  7/16/2019 1940 by Roosevelt Fabian  Outcome: Ongoing  7/16/2019 0959 by Keiry Gonzalez RN  Outcome: Ongoing     Problem: Pain:  Goal: Pain level will decrease  Description  Pain level will decrease  7/16/2019 1940 by Roosevelt Fabian  Outcome: Ongoing  7/16/2019 0959 by Keiry Gonzalez RN  Outcome: Ongoing  Goal: Control of acute pain  Description  Control of acute pain  7/16/2019 1940 by Roosevelt Fabian  Outcome: Ongoing  7/16/2019 0959 by Keiry Gonzalez RN  Outcome: Ongoing  Goal: Control of chronic pain  Description  Control of chronic pain  Outcome: Ongoing General -TTE with severely decreased LV systolic function w/ LVEF 20%, regional WMA and multiple segmental abnormalities, reduced RV systolic function. LV function worsening since prior echo  -S/p cath with multivessel disease  -On milrinone drip   -S/p Bumex gtt   -Management per HF team.

## 2025-02-28 NOTE — PHARMACOTHERAPY INTERVENTION NOTE - COMMENTS
Collected medication history from patient and pharmacy. Home medication list updated in prescription writer/ outpatient medication review.    Home medications:  furosemide 40 mg oral tablet: 1 tab(s) orally once a day  Insulin Aspart: via CeQur insulin patch (~2-10 units 3 times a day with meals)  metoprolol succinate 25 mg oral tablet, extended release: 1 tab(s) orally once a day  Tresiba FlexTouch 200 units/mL subcutaneous solution: 40 unit(s) subcutaneous once a day (at bedtime)    Tianna WoodruffD, BCPS  Clinical Pharmacy Specialist  Available on [employer]  Cell: [phone]
65 y/o female currently on Unasyn 3g IV Q12H from 2/6/25 (was on Unasyn 3g IV Q24H 2/1-2/6) for right leg cellulitis. Patient s/p right popliteal artery angioplasty on 2/24. Recommend monitoring patient off antibiotics as patient has completed 4 weeks of therapy.     Bebe Kennedy, PharmD, BCPS  Clinical Pharmacy Specialist  Available on [employer] - is now [employer]  Cell: [phone]

## 2025-03-01 LAB
ANION GAP SERPL CALC-SCNC: 20 MMOL/L — HIGH (ref 5–17)
BUN SERPL-MCNC: 45 MG/DL — HIGH (ref 7–23)
CALCIUM SERPL-MCNC: 8.8 MG/DL — SIGNIFICANT CHANGE UP (ref 8.4–10.5)
CHLORIDE SERPL-SCNC: 87 MMOL/L — LOW (ref 96–108)
CO2 SERPL-SCNC: 21 MMOL/L — LOW (ref 22–31)
CREAT SERPL-MCNC: 3.65 MG/DL — HIGH (ref 0.5–1.3)
EGFR: 13 ML/MIN/1.73M2 — LOW
EGFR: 13 ML/MIN/1.73M2 — LOW
GLUCOSE BLDC GLUCOMTR-MCNC: 111 MG/DL — HIGH (ref 70–99)
GLUCOSE BLDC GLUCOMTR-MCNC: 140 MG/DL — HIGH (ref 70–99)
GLUCOSE BLDC GLUCOMTR-MCNC: 152 MG/DL — HIGH (ref 70–99)
GLUCOSE BLDC GLUCOMTR-MCNC: 157 MG/DL — HIGH (ref 70–99)
GLUCOSE SERPL-MCNC: 188 MG/DL — HIGH (ref 70–99)
HCT VFR BLD CALC: 25.9 % — LOW (ref 34.5–45)
HGB BLD-MCNC: 9.1 G/DL — LOW (ref 11.5–15.5)
MCHC RBC-ENTMCNC: 25.8 PG — LOW (ref 27–34)
MCHC RBC-ENTMCNC: 35.1 G/DL — SIGNIFICANT CHANGE UP (ref 32–36)
MCV RBC AUTO: 73.4 FL — LOW (ref 80–100)
NRBC BLD AUTO-RTO: 0 /100 WBCS — SIGNIFICANT CHANGE UP (ref 0–0)
PLATELET # BLD AUTO: 287 K/UL — SIGNIFICANT CHANGE UP (ref 150–400)
POTASSIUM SERPL-MCNC: 3.5 MMOL/L — SIGNIFICANT CHANGE UP (ref 3.5–5.3)
POTASSIUM SERPL-SCNC: 3.5 MMOL/L — SIGNIFICANT CHANGE UP (ref 3.5–5.3)
RBC # BLD: 3.53 M/UL — LOW (ref 3.8–5.2)
RBC # FLD: 21.2 % — HIGH (ref 10.3–14.5)
SODIUM SERPL-SCNC: 128 MMOL/L — LOW (ref 135–145)
WBC # BLD: 11.43 K/UL — HIGH (ref 3.8–10.5)
WBC # FLD AUTO: 11.43 K/UL — HIGH (ref 3.8–10.5)

## 2025-03-01 RX ORDER — MIDODRINE HYDROCHLORIDE 5 MG/1
10 TABLET ORAL ONCE
Refills: 0 | Status: COMPLETED | OUTPATIENT
Start: 2025-03-01 | End: 2025-03-01

## 2025-03-01 RX ORDER — EPOETIN ALFA 10000 [IU]/ML
8000 SOLUTION INTRAVENOUS; SUBCUTANEOUS ONCE
Refills: 0 | Status: COMPLETED | OUTPATIENT
Start: 2025-03-01 | End: 2025-03-01

## 2025-03-01 RX ADMIN — INSULIN LISPRO 1: 100 INJECTION, SOLUTION INTRAVENOUS; SUBCUTANEOUS at 09:08

## 2025-03-01 RX ADMIN — INSULIN GLARGINE-YFGN 11 UNIT(S): 100 INJECTION, SOLUTION SUBCUTANEOUS at 21:28

## 2025-03-01 RX ADMIN — Medication 1 MILLIGRAM(S): at 12:47

## 2025-03-01 RX ADMIN — Medication 650 MILLIGRAM(S): at 15:00

## 2025-03-01 RX ADMIN — MIDODRINE HYDROCHLORIDE 10 MILLIGRAM(S): 5 TABLET ORAL at 15:00

## 2025-03-01 RX ADMIN — Medication 1 MILLIGRAM(S): at 22:10

## 2025-03-01 RX ADMIN — Medication 650 MILLIGRAM(S): at 15:35

## 2025-03-01 RX ADMIN — INSULIN LISPRO 5 UNIT(S): 100 INJECTION, SOLUTION INTRAVENOUS; SUBCUTANEOUS at 09:18

## 2025-03-01 RX ADMIN — INSULIN LISPRO 5 UNIT(S): 100 INJECTION, SOLUTION INTRAVENOUS; SUBCUTANEOUS at 12:59

## 2025-03-01 RX ADMIN — Medication 1 MILLIGRAM(S): at 21:22

## 2025-03-01 RX ADMIN — HEPARIN SODIUM 5000 UNIT(S): 1000 INJECTION INTRAVENOUS; SUBCUTANEOUS at 14:45

## 2025-03-01 RX ADMIN — INSULIN LISPRO 5 UNIT(S): 100 INJECTION, SOLUTION INTRAVENOUS; SUBCUTANEOUS at 19:04

## 2025-03-01 RX ADMIN — Medication 81 MILLIGRAM(S): at 12:50

## 2025-03-01 RX ADMIN — MILRINONE LACTATE 5.41 MICROGRAM(S)/KG/MIN: 1 INJECTION, SOLUTION INTRAVENOUS at 09:04

## 2025-03-01 RX ADMIN — LINAGLIPTIN 5 MILLIGRAM(S): 5 TABLET, FILM COATED ORAL at 12:50

## 2025-03-01 RX ADMIN — BUMETANIDE 132 MILLIGRAM(S): 1 TABLET ORAL at 06:06

## 2025-03-01 RX ADMIN — INSULIN LISPRO 1: 100 INJECTION, SOLUTION INTRAVENOUS; SUBCUTANEOUS at 12:58

## 2025-03-01 RX ADMIN — INSULIN GLARGINE-YFGN 11 UNIT(S): 100 INJECTION, SOLUTION SUBCUTANEOUS at 00:22

## 2025-03-01 RX ADMIN — Medication 1 MILLIGRAM(S): at 06:01

## 2025-03-01 RX ADMIN — Medication 1 MILLIGRAM(S): at 07:01

## 2025-03-01 RX ADMIN — Medication 650 MILLIGRAM(S): at 00:40

## 2025-03-01 RX ADMIN — CLOPIDOGREL BISULFATE 75 MILLIGRAM(S): 75 TABLET, FILM COATED ORAL at 12:50

## 2025-03-01 RX ADMIN — EPOETIN ALFA 8000 UNIT(S): 10000 SOLUTION INTRAVENOUS; SUBCUTANEOUS at 16:27

## 2025-03-01 RX ADMIN — MILRINONE LACTATE 5.41 MICROGRAM(S)/KG/MIN: 1 INJECTION, SOLUTION INTRAVENOUS at 20:02

## 2025-03-01 RX ADMIN — Medication 5 MILLIGRAM(S): at 06:10

## 2025-03-01 RX ADMIN — ATORVASTATIN CALCIUM 40 MILLIGRAM(S): 80 TABLET, FILM COATED ORAL at 21:23

## 2025-03-01 RX ADMIN — HEPARIN SODIUM 5000 UNIT(S): 1000 INJECTION INTRAVENOUS; SUBCUTANEOUS at 06:02

## 2025-03-01 RX ADMIN — Medication 40 MILLIGRAM(S): at 12:50

## 2025-03-01 RX ADMIN — HEPARIN SODIUM 5000 UNIT(S): 1000 INJECTION INTRAVENOUS; SUBCUTANEOUS at 21:23

## 2025-03-01 RX ADMIN — Medication 1 APPLICATION(S): at 08:58

## 2025-03-01 NOTE — PROGRESS NOTE ADULT - SUBJECTIVE AND OBJECTIVE BOX
[facility] NEPHROLOGY- PROGRESS NOTE    64y Female with history of CHF presents as a transfer for LE CO2 angiogram. Nephrology consulted for elevated Scr.      REVIEW OF SYSTEMS:  Gen: no fevers  Cards: no chest pain  Resp: no dyspnea  GI: no nausea or vomiting or diarrhea  Vascular: + LE edema improving    Augmentin (Stomach Upset; Vomiting; Nausea)  No Known Allergies      Hospital Medications: Medications reviewed        VITALS:  T(F): 98.4 (03-01-25 @ 04:31), Max: 98.4 (03-01-25 @ 04:31)  HR: 100 (03-01-25 @ 04:31)  BP: 94/53 (03-01-25 @ 08:34)  RR: 18 (03-01-25 @ 04:31)  SpO2: 92% (03-01-25 @ 04:31)  Wt(kg): --    02-28 @ 07:01  -  03-01 @ 07:00  --------------------------------------------------------  IN: 120 mL / OUT: 2025 mL / NET: -1905 mL          PHYSICAL EXAM:    Gen: NAD, calm  Cards: RRR, +S1/S2, no M/G/R  Resp: CTA B/L  GI: soft, NT/ND, NABS  : + stiles  Vascular: + LE wrapped B/L with RLE edema > LLE edema, + RIJ subclavian        LABS:  03-01    128[L]  |  87[L]  |  45[H]  ----------------------------<  188[H]  3.5   |  21[L]  |  3.65[H]    Ca    8.8      01 Mar 2025 06:54      Creatinine Trend: 3.65 <--, 3.12 <--, 3.39 <--, 2.65 <--, 2.34 <--, 2.12 <--, 1.89 <--                        9.1    11.43 )-----------( 287      ( 01 Mar 2025 06:55 )             25.9     Urine Studies:  Urinalysis Basic - ( 01 Mar 2025 06:54 )    Color:  / Appearance:  / SG:  / pH:   Gluc: 188 mg/dL / Ketone:   / Bili:  / Urobili:    Blood:  / Protein:  / Nitrite:    Leuk Esterase:  / RBC:  / WBC    Sq Epi:  / Non Sq Epi:  / Bacteria:

## 2025-03-01 NOTE — PROGRESS NOTE ADULT - SUBJECTIVE AND OBJECTIVE BOX
MR#[id number]  PATIENT NAME:COLE ALBA    DATE OF SERVICE: 25 @ 07:14  Patient was seen and examined by Yordy Gilmore MD on    25 @ 07:14 .  Interim events noted.Consultant notes ,Labs,Telemetry reviewed by me       HOSPITAL COURSE: HPI:  64F, hx of CHF (last TTE on 25 EF 30-35%, G2DD), DM, diabetic foot ulcer with a recent admission at [facility] for CHF exacerbation presented as a transfer for worsening R. leg pain and fluid secretion, On non-invasive imaging demonstrating severe depletion of RLE blood flow beyond the popliteal vessel at knee and similar findings in L. Was transferred to [facility] for CO2 angiogram with Dr. Baltazar. (2025 20:05)      INTERIM EVENTS:Patient seen at bedside ,interim events noted.    -Had cath Multivessel CAD started on Milrinone for CTS evaluation albeit targets not optimal  02/15-Awake on Milrinone and Bumex gtt-seen by CTS not a surgical candidate-needs Vascular work-up for LE PAD-scheduled for Angiogram on Wednesday    Weight 170Kg---> 164 Kg--->161.1 Kg---151.2 ---> 155 --> 154.7 ---> 158 --> 148 Kg    -s/p RLE angiogram with pop and AT angioplasty   -Awake had iHD plan for High risk PCI oLM/LAD tomorrow 1000mL removed  Had ? pAF ~~3 secs  -Awake no dyspnea chest pain plan for PCI-oLM/LAD todat HD after PCI-Sinus rhythm  -Had C PCWP-15 still elevated with low BP Plan to dialyse over weekend and plan for PCI on Monday-No dyspnea    PMH -reviewed admission note, no change since admission  HEART FAILURE: Acute[x ]Chronic[ ] Systolic[x ] Diastolic[ ] Combined Systolic and Diastolic[ ]  CAD[x ] CABG[ ] PCI[ ]  DEVICES[ ] PPM[ ] ICD[ ] ILR[ ]  ATRIAL FIBRILLATION[ ] Paroxysmal[ ] Permanent[ ] CHADS2-[  ]  JAMEL[x ] CKD1[ ] CKD2[ ] CKD3[ ] CKD4[x ] ESRD[ ]  COPD[ ] HTN[x ]   DM[x ] Type1[ ] Type 2[x ]   CVA[ ] Paresis[ ]    AMBULATION: Assisted[x ] Cane/walker[ ] Independent[ ]      MEDICATIONS  (STANDING):  aspirin enteric coated 81 milliGRAM(s) Oral daily  atorvastatin 40 milliGRAM(s) Oral at bedtime  benzocaine/menthol Lozenge 1 Lozenge Oral once  bisacodyl 5 milliGRAM(s) Oral every 12 hours  buMETAnide IVPB 4 milliGRAM(s) IV Intermittent daily  chlorhexidine 4% Liquid 1 Application(s) Topical <User Schedule>  clopidogrel Tablet 75 milliGRAM(s) Oral daily  clopidogrel Tablet      glucagon  Injectable 1 milliGRAM(s) IntraMuscular once  heparin   Injectable 5000 Unit(s) SubCutaneous every 8 hours  hydrALAZINE 10 milliGRAM(s) Oral three times a day  insulin glargine Injectable (LANTUS) 11 Unit(s) SubCutaneous at bedtime  insulin lispro (ADMELOG) corrective regimen sliding scale   SubCutaneous three times a day before meals  insulin lispro (ADMELOG) corrective regimen sliding scale   SubCutaneous at bedtime  insulin lispro Injectable (ADMELOG) 5 Unit(s) SubCutaneous three times a day with meals  isosorbide   dinitrate Tablet (ISORDIL) 5 milliGRAM(s) Oral three times a day  linagliptin 5 milliGRAM(s) Oral daily  midodrine. 5 milliGRAM(s) Oral once  milrinone Infusion 0.25 MICROgram(s)/kG/Min (5.41 mL/Hr) IV Continuous <Continuous>  pantoprazole  Injectable 40 milliGRAM(s) IV Push daily  polyethylene glycol 3350 17 Gram(s) Oral daily  senna 2 Tablet(s) Oral at bedtime    MEDICATIONS  (PRN):  acetaminophen     Tablet .. 650 milliGRAM(s) Oral every 6 hours PRN Mild Pain (1 - 3)  dextrose Oral Gel 15 Gram(s) Oral once PRN Blood Glucose LESS THAN 70 milliGRAM(s)/deciliter  HYDROmorphone  Injectable 1 milliGRAM(s) IV Push every 6 hours PRN Severe Pain (7 - 10)  melatonin 3 milliGRAM(s) Oral at bedtime PRN Insomnia  ondansetron Injectable 4 milliGRAM(s) IV Push every 6 hours PRN Nausea and/or Vomiting  sodium chloride 0.65% Nasal 1 Spray(s) Both Nostrils three times a day PRN Dryness  sodium chloride 0.9% lock flush 10 milliLiter(s) IV Push every 1 hour PRN Pre/post blood products, medications, blood draw, and to maintain line patency            REVIEW OF SYSTEMS:  Constitutional: [ ] fever, [ ]weight loss,  [ x]fatigue [ ]weight gain  Eyes: [ ] visual changes  Respiratory: [ ]shortness of breath;  [ ] cough, [ ]wheezing, [ ]chills, [ ]hemoptysis  Cardiovascular: [ ] chest pain, [ ]palpitations, [ ]dizziness,  [ ]leg swelling[ ]orthopnea[ ]PND  Gastrointestinal: [ ] abdominal pain, [ ]nausea, [ ]vomiting,  [ ]diarrhea [ ]Constipation [ ]Melena  Genitourinary: [ ] dysuria, [ ] hematuria [ ][place]  Neurologic: [ ] headaches [ ] tremors[ ]weakness [ ]Paralysis Right[ ] Left[ ]  Skin: [ ] itching, [ ]burning, [ ] rashes  Endocrine: [ ] heat or cold intolerance  Musculoskeletal: [ ] joint pain or swelling; [ ] muscle, back, or extremity pain  Psychiatric: [ ] depression, [ ]anxiety, [ ]mood swings, or [ ]difficulty sleeping  Hematologic: [ ] easy bruising, [ ] bleeding gums    [ ] All remaining systems negative except as per above.   [ ]Unable to obtain.  [x] No change in ROS since admission      Vital Signs Last 24 Hrs  T(C): 36.9 (01 Mar 2025 04:31), Max: 36.9 (01 Mar 2025 04:31)  T(F): 98.4 (01 Mar 2025 04:31), Max: 98.4 (01 Mar 2025 04:31)  HR: 100 (01 Mar 2025 04:31) (100 - 111)  BP: 93/56 (01 Mar 2025 04:) (93/56 - 118/62)  BP(mean): 78 (2025 18:30) (76 - 84)  RR: 18 (01 Mar 2025 04:31) (16 - 19)  SpO2: 92% (01 Mar 2025 04:31) (90% - 95%)    Parameters below as of 01 Mar 2025 04:31  Patient On (Oxygen Delivery Method): room air      I&O's Summary    2025 07:01  -  01 Mar 2025 07:00  --------------------------------------------------------  IN: 120 mL / OUT: 5 mL / NET: -1905 mL        PHYSICAL EXAM:  General: No acute distress BMI-24  HEENT: EOMI, PERRL  Neck: Supple, [ ] JVD  Lungs: Equal air entry bilaterally; [ ] rales [ ] wheezing [ ] rhonchi  Heart: Regular rate and rhythm; [x ] murmur   2/6 [ x] systolic [ ] diastolic [ ] radiation[ ] rubs [ ]  gallops  Abdomen: Nontender, bowel sounds present  Extremities: No clubbing, cyanosis, [ ] edema [ x]Warm, well perfused        RLE: Dressings in place, blistering and ulceration on the dorsal aspect of the foot        LLE: First digit dry gangrene on the plantar aspect  Nervous system:  Alert & Oriented X3, no focal deficits  Psychiatric: Normal affect  Skin: No rashes or lesions    LABS:        Creatinine Trend: 3.12<--, 3.39<--, 2.65<--, 2.34<--, 2.12<--, 1.89<--          TTE Limited W or WO Ultrasound Enhancing Agent (25 @ 12:39) >  CONCLUSIONS:      1. Limited TTE to assess for MR, IVC, LVOT, TR.   2. Left ventricular systolic function is severely decreased.   3. Reduced right ventricular systolic function.   4. LVOT VTI 12.0cm, Stroke volume 35cc. LVOT diameter 1.9cm.   5. Mild to moderate tricuspid regurgitation.   6. Estimated pulmonary artery systolic pressure is 32 mmHg, consistent with normal pulmonary artery pressure.   7. The inferior vena cava is normal in size measuring 1.70 cm in diameter, (normal <2.1cm) with abnormal inspiratory collapse (abnormal <50%) consistent with mildly elevated right atrial pressure (~8, range 5-10mmHg).   8. Compared to the transthoracic echocardiogram performed on 2/10/2025, RV and LV function still .         Xray Chest 1 View AP/PA (25 @ 18:05) >    IMPRESSION:  No focal consolidations.  Mildpulmonary vascular congestion.        Cardiac Catheterization (25 @ 14:24) >  Diagnostic Conclusions:     Right dominant coronary anatomy with severe multivessel disease (high syntax score)  LM   Left main artery: There is a 70 % stenosis in the middle third portion of the segment.    LAD   Left anterior descending artery: Angiography shows severe atherosclerosis. There is a 90 % stenosis in the middle third portion of the segment.      CX   Circumflex: Angiography shows severe atherosclerosis. There is an 80 % stenosis in the ostium portion of the segment.    RCA   Right coronary artery: The distal vessel is supplied by limited collaterals from the Left anterior descending artery. There is a 100 % total occlusion stenosis in the proximal third portion of the segment.      Mild-moderate post capillary pulmonary hypertension, with reduced cardiac output/index  Elevated RA (22mmHg) and PCWP (35mmHg) pressures. Elevated V waves on  PCWP tracing (44mmHg)    MR Cardiac w/wo IV Cont (25 @ 09:44) >  IMPRESSION:.    The left ventricle demonstrates severe wall motion abnormalities and  function is depressed (calculated LVEF is 25%).    There is greater than 75% subendocardial scarring involving the basal to  mid inferior wall of the left ventricle.    There is left ventricular transmural scarring involving the apical septal  wall and likely near transmural scarring of the apical lateral wall.    There is about 50% scarring of the left ventricular basal inferoseptal  wall.

## 2025-03-01 NOTE — PROGRESS NOTE ADULT - SUBJECTIVE AND OBJECTIVE BOX
Name of Patient : TOMASZ ALBA  MRN: [id number]  Date of visit: 03-01-25    Subjective: Patient seen and examined. No new events except as noted.   high risk PCI, follow up with card     REVIEW OF SYSTEMS:    CONSTITUTIONAL: No weakness, fevers or chills  EYES/ENT: No visual changes;  No vertigo or throat pain   NECK: No pain or stiffness  RESPIRATORY: No cough, wheezing, hemoptysis; No shortness of breath  CARDIOVASCULAR: No chest pain or palpitations  GASTROINTESTINAL: No abdominal or epigastric pain. No nausea, vomiting, or hematemesis; No diarrhea or constipation. No melena or hematochezia.  GENITOURINARY: No dysuria, frequency or hematuria  NEUROLOGICAL: No numbness or weakness  SKIN: No itching, burning, rashes, or lesions   All other review of systems is negative unless indicated above.    MEDICATIONS:  MEDICATIONS  (STANDING):  aspirin enteric coated 81 milliGRAM(s) Oral daily  atorvastatin 40 milliGRAM(s) Oral at bedtime  benzocaine/menthol Lozenge 1 Lozenge Oral once  bisacodyl 5 milliGRAM(s) Oral every 12 hours  buMETAnide IVPB 4 milliGRAM(s) IV Intermittent daily  chlorhexidine 4% Liquid 1 Application(s) Topical <User Schedule>  clopidogrel Tablet 75 milliGRAM(s) Oral daily  clopidogrel Tablet      dextrose 5%. 1000 milliLiter(s) (100 mL/Hr) IV Continuous <Continuous>  dextrose 5%. 1000 milliLiter(s) (50 mL/Hr) IV Continuous <Continuous>  dextrose 50% Injectable 25 Gram(s) IV Push once  dextrose 50% Injectable 12.5 Gram(s) IV Push once  dextrose 50% Injectable 25 Gram(s) IV Push once  glucagon  Injectable 1 milliGRAM(s) IntraMuscular once  heparin   Injectable 5000 Unit(s) SubCutaneous every 8 hours  hydrALAZINE 10 milliGRAM(s) Oral three times a day  insulin glargine Injectable (LANTUS) 11 Unit(s) SubCutaneous at bedtime  insulin lispro (ADMELOG) corrective regimen sliding scale   SubCutaneous three times a day before meals  insulin lispro (ADMELOG) corrective regimen sliding scale   SubCutaneous at bedtime  insulin lispro Injectable (ADMELOG) 5 Unit(s) SubCutaneous three times a day with meals  isosorbide   dinitrate Tablet (ISORDIL) 5 milliGRAM(s) Oral three times a day  linagliptin 5 milliGRAM(s) Oral daily  milrinone Infusion 0.25 MICROgram(s)/kG/Min (5.41 mL/Hr) IV Continuous <Continuous>  pantoprazole  Injectable 40 milliGRAM(s) IV Push daily  polyethylene glycol 3350 17 Gram(s) Oral daily  senna 2 Tablet(s) Oral at bedtime      PHYSICAL EXAM:  T(C): 36.3 (03-01-25 @ 20:15), Max: 36.9 (03-01-25 @ 04:31)  HR: 105 (03-01-25 @ 20:15) (94 - 111)  BP: 95/57 (03-01-25 @ 20:15) (93/56 - 108/61)  RR: 18 (03-01-25 @ 20:15) (18 - 18)  SpO2: 97% (03-01-25 @ 20:15) (92% - 97%)  Wt(kg): --  I&O's Summary    28 Feb 2025 07:01  -  01 Mar 2025 07:00  --------------------------------------------------------  IN: 120 mL / OUT: 2025 mL / NET: -1905 mL    01 Mar 2025 07:01  -  01 Mar 2025 23:59  --------------------------------------------------------  IN: 599.9 mL / OUT: 1800 mL / NET: -1200.1 mL          Appearance: Normal	  HEENT:  PERRLA   Lymphatic: No lymphadenopathy   Cardiovascular: Normal S1 S2, no JVD  Respiratory: normal effort , clear  Gastrointestinal:  Soft, Non-tender  Skin: No rashes,  warm to touch  Psychiatry:  Mood & affect appropriate  Musculuskeletal: No edema    recent labs, Imaging and EKGs personally reviewed   CODE status discussed with the patient in detail    02-28-25 @ 07:01  -  03-01-25 @ 07:00  --------------------------------------------------------  IN: 120 mL / OUT: 2025 mL / NET: -1905 mL    03-01-25 @ 07:01  -  03-01-25 @ 23:59  --------------------------------------------------------  IN: 599.9 mL / OUT: 1800 mL / NET: -1200.1 mL                          9.1    11.43 )-----------( 287      ( 01 Mar 2025 06:55 )             25.9               03-01    128[L]  |  87[L]  |  45[H]  ----------------------------<  188[H]  3.5   |  21[L]  |  3.65[H]    Ca    8.8      01 Mar 2025 06:54                         Urinalysis Basic - ( 01 Mar 2025 06:54 )    Color: x / Appearance: x / SG: x / pH: x  Gluc: 188 mg/dL / Ketone: x  / Bili: x / Urobili: x   Blood: x / Protein: x / Nitrite: x   Leuk Esterase: x / RBC: x / WBC x   Sq Epi: x / Non Sq Epi: x / Bacteria: x

## 2025-03-01 NOTE — PROGRESS NOTE ADULT - ASSESSMENT
64y Female with history of CHF presents as a transfer for LE CO2 angiogram. Nephrology consulted for elevated Scr.    1) JAMEL: likely due to ATN and CRS for which patient initiated on RRT on 2/20 with last UF on 2/28 tolerated well with 1.5L removed post-LHC. Plan for additional HD today in order to optimize prior to high risk PCI on 3/3 given elevated LVEDP. On milrinone gtt as per HF. Monitor for renal recovery. Avoid nephrotoxins.    2) HTN: BP low normal. Continue with current medications. Will give midodrine prior to HD to avoid intradialytic hypotension.    3) LE edema: On bumex 4 mg IV daily. UF with HD. TTE with severely decreased LVSF. Monitor UO.     4) Anemia: Hb low with low TSAT. No IV iron given elevated ferritin. S/P Epo 8K X 1 dose on 2/26. Will give another dose today. Monitor Hb.      [facility] NEPHROLOGY  Raji Waterman M.D.  Mars Feliz D.O.  Kelley Parks M.D.  MD Nancy Kulkarni, MSN, ANP-C    Telephone: [phone]  Facsimile: [phone] [id number], [address]  [place] [address]

## 2025-03-01 NOTE — PROGRESS NOTE ADULT - ASSESSMENT
63 YO F with PMHx of HFrEF 30-35 and grade 2 ddfxn (last TTE on 01/16/25), DM2, and diabetic foot ulcer. Recent admission at [facility] for ADHF. Patient now represents to OSH and ultimately to [facility] as a transfer for worsening right leg pain and fluid secretion. As per documentation, patient was reported to have severe depletion of RLE blood flow beyond the popliteal vessel at knee and similar findings in the left. Patient was transferred to [facility] for CO2 angiogram with Dr. Baltazar. Of note, patient also with reported visual disturbance for which patient was seen and evaluated by opthalmology. Internal Medicine has been consulted on Ms. Kirby's care for medical management.     # ADHF/ BiV HFrEF 20   - Recent admission at [facility] for ADHF and on dobutamine outpatient  - TTE 1/16 with EF 30-35 with moderately reduced LVSF and grade 2 ddfxn, normal RVSF , trace TR/ WV, and trace pericardial effusion  - NST abnormal with small-sized, moderate defect in apical wall that is predominantly fixed suggestive of an infarction with minimal marcus-infarct ischemia. Post stress LVEF 25.  - CT Chest with small BL pleural effusions and small pericardial effusion.  - RPT TTE with EF 20, severely decreased LV, decreased RVSF with TAPSE 1.2, and regional wall motion abnormalities present with entire septum, entire apex, entire inferior wall, mid inferolateral segment, and mid anterolateral segment are hypokinetic.   - RHC with RA 20, PA 49/29 (40), PCWP 35, mVO2 51.1, CO/CI 3.8/2.2, SVR 1095 w/ Multivessel CAD   - s/p zaroxyln 10 QD  - CRE and sodium rising and bumex GTT stopped 2/18 and HTS stopped 2/16   - RPT limited TTE w/ LVSF severely decreased, reduced RVSF, LVOT VTI 12, mild to mod TR, and mildly elevated right atrial pressure  - Continue on milrinone gtt as per Cardio   - Continue on hydral 25 and isordil 5  - HF and cards following and adjusting medications   - Case discussed with EP and needs to be on GDMT for 3 months before AICD placement and should be stabilized off of inotropic support.   - Monitor I and O, diuresis per Cardio/ Renal    - GDMT as per Cardio -> on Hydralazine, Isosorbide   - Monitor volume status   - Check daily weights    - Monitor on telemetry; Monitor electrolytes   - Cardio, HF, Renal, and EP evals appreciated; F/u recs   - high risk PCI    # CAD with TVD   - [facility] with RCA , severe ostial LM disease, diffuse disease in LAD and LCx, some L to R collaterals (Multivessel CAD)  - Case discussed with CTSx and NOT a candidate for CABG due to comorbidities  - Cardiac MRI with greater than 75% subendocardial scarring of the basal to mid-inferior wall of the LV and 50% scarring of the left ventricular basal inferoseptal wall. There is also left ventricular transmural scarring involving the apical septal wall and likely near transmural scarring of the apical lateral wall.  - Plan for RPT [facility] with high risk PCI , plan for today   - AS and Statin   - Cardiology following     # BL LE Edema likely in setting of ADHF  - Diuresis as per Cardio, HF and Renal   - BL LE elevation and compression  - LE Duplex neg   - Monitor for now  - Podiatry and Vascular evals appreciated; F/u recs -->S/P angio w/ angioplasty with vascular, on DAPT     # Pericardial effusion likely in setting ADHF  - TTE with trace pericardial effusion   - CT Chest with small pericardial effusion   - Denies chest pain, palpitations, chest tightness or discomfort, shortness of breath or dyspnea   - Repeat TTE in 1 month for further evaluation   - Diuresis per cardio/ renal.  - Monitor on telemetry  - Cardio following    # Pleural effusion likely in setting ADHF  - CT Chest with small BL pleural effusions  - Monitor O2 saturation  - Supplement to maintain > 90%   - Diuresis per cardio/ renal.     # JAMEL with Hyponatremia and Metabolic Acidosis   - Baseline CRE 0.7-1.2 and increased to ~4  - As per OSH documentation, JAMEL was thought to be second to Unasyn/ Bumex / Entresto use and Hypotension   - US RENAL with no hydronephrosis  - Likely cardiorenal induced with low flow state in ADHF, however now rising again and concern for milrinone vs overdiuresis (prerenal) vs cardiorenal.   - Milrinone adjusted and diuresis turned off, however no improvement/ worsened in renal function noted.   - s/p shiley placement and started on HD 2/20  - c/w HD per renal   - c/w high dose bumex 4mg IV QD, however remains oliguric .   - HF and renal following   - Avoid nephrotoxic agents  - Monitor Cr and daily BMP     # Transaminitis  - Likely 2/2 hepatic congestion   - Volume removal with HD as tolerated  - Trend LFTs, if uptrend post-HD initiation, check ABD US  - Serial ABD Examinations    # Hypernatremia   - Hypernatremia likely from HTS vs over diuresis/ intravascular dehydration   - Hold further HTS   - Sodium improved   - Monitor sodium     # Leukocytosis likely in setting of BL LE ulcers with pop occlusion   - BCx negative   - UCx with probable contamination   - On unasyn for ABX. Frequency increased to Q12 as per Renal   - Leukocytosis fluctuating, however appears nontoxic with no fever spikes and monitoring closely on below unasyn.   - If febrile check pan cultures   - Trend CBC, temp curve, VS and adjust as tolerated    # Foot ulcers with worsening RLE pain second to pop artery occlusion +/- diabetic ulcers   - RLE Arterial Duplex with popliteal artery occlusion   - LLE Arterial Duplex with underlying disease cannot be excluded   - s/p angio with pop and AT VERA on 2/24   - Continue on Lipitor  - Continue on DAPT  - Continue pain control with oxy  - Wound care called for dressing recs   - Vascular following,     # Tachycardia likely pain related   - On Toprol and improved  - Continue to monitor on tele   - Cardio eval appreciated    # Visual Disturbance  - CT Head w/ old infarcts  - On ASA and Statin   - Opthalmology eval appreciated    # Indigestion and Constipation   - PPI, miralax and senna continued  - Monitor BMs    # Anemia likely mixed AOCD vs iron deficiency   - HH stable in 9s on HSQ  - Anemia panel with AOCD   - Started on venofer, but ferritin high and now stopped   - Continue on EPO with HD  - Monitor HH   - Transfuse for Hgb < 8  - Maintain active T/S    # Diabetes Mellitus A1C 11.6   - Continue on lantus 10 with tradjecta 5 and ISS   - Diabetic DASH diet   - Monitor and adjust glucose levels PRN   - Endocrine following; F/u recs     # HLD and HLD  - Continue on lipitor and toprol  - Monitor BP    # DISPO TBD  - Palliative care called and patient remains FULL CODE

## 2025-03-01 NOTE — PROGRESS NOTE ADULT - ASSESSMENT
64F, hx of CHF (last TTE on 1/16/25 EF 30-35%, G2DD), DM, diabetic foot ulcer with a recent admission at [facility] for CHF exacerbation 1/14-1/17 p/w worsening R. leg pain and fluid secretion, was meeting sepsis criteria with podiatry having low suspicion for right cellulitis and admitted for continued management of HF exacerbation and needing ischemic eval.     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  -Left leg Arterial Duplex: Slightly tardus parvus waveform of the left popliteal artery is noted, for which underlying disease cannot be excluded. Posterior tibial artery waveform is nonpulsatile. Anterior tibial artery tardus parvus flow is noted.   Transferred to [facility] for CO2 angiogram as per vascular surgery    #  PAD Wound of lower extremity.   ·  Plan: Podiatry following, b/l serous bullae, no concerns for infection  Found to have RLE coolness  -Right Leg Arterial Duplex: Popliteal artery is occluded with negligible flow of right trifurcation arteries.  -Left leg Arterial Duplex: Slightly tardus parvus waveform of the left popliteal artery is noted, for which underlying disease cannot be excluded. Posterior tibial artery waveform is nonpulsatile. Anterior tibial artery tardus parvus flow is noted.  -Started on heparin gtt and dobutamine gtt -Will transfer to [facility] for CO2 angiogram as per vascular surgery as not candidate for CTA due to worsening SCr  -Keep compression dressing  -LE elevation above heart level at rest.  -Transferred to [facility] for CO2 angiogram   - Overall this patient is at   intermediate  risk (for cardiac death, nonfatal myocardial infarction, and nonfatal cardiac arrest perioperatively for this intermediate  risk procedure).   No cardiac contraindications for CO2 vangiogram  There  are  no further recommendation for risk stratifying imaging/stress testing prior to planned surgery  Seen by Podiatry-No acute pod intervention, local wound care only-         PAD with rest ischemia  s/p AT/Popliteal angioplasty on DAPT        # HFrEF (congestive heart failure). Ischemic Cardiomyopathy  ·  Plan: Hx of HF, previous admission in January, on home Lasix 40 qD, Metoprolol Succ 25 qD. Not on Entresto due to insurance issues  Last TTE: LVSF moderately decreased w/ EF 30-35 %. Moderate G2DD. Mild MR  Stress test: small-sized, moderate defect(s) in the apical wall that is predominantly fixed suggestive of an infarction with minimal marcus-infarct ischemia  Ischemic cardiomyopathy  GDMT on hold 2/2 JAMEL    Repeat TTE EF 20% with WMA RAP~~8  Likely Ischemic cardiomyopathy despite NST revealing fixed defectLHC confirming  Multivessel CAD  Remains on Milrinone add Hydrall/isosorbide      RHC: RA 20, PA 49/29 (40), PCWP 35, mVO2 51.1, CO/CI 3.8/2.2, SVR 1095    # CAD  [facility]-RCA , severe ostial LM disease, diffuse disease in LAD and LCx, some L to R collaterals-seen by CTS advise cMR for viability poor target vessels for CABG-?High risk LM/LAD PCI  Had cMR-LVEF is 25%, greater than 75% subendocardial scarring involving the basal to mid inferior wall of the left ventricle, left ventricular transmural scarring involving the apical septal wall and likely near transmural scarring of the apical lateral wall. 50% scarring of the left ventricular basal inferoseptal wall.  Will need High risk PCI vs CABG though less likely 2/2 poor target vessel    PLAN FOR High risk PCI today Dr Fair    Started on Milrinone to assist augmented diuresis in attempt to avoid further renal failure  Has lost Weight 170---> 164 Kg---> 161.1 Kg---> 165 Kg-->151.2---> 155 ---> 158 --> 148 Kg      For HD catheter placement to initiate Dialysis  02/21-Started HD-500mL  02/22-HD 1000mL  02/22-For LE CO2 angiogram-No cardiac contraindications to proceeding with procedure  02/24-CO2 angiogram  02/25-s/p RLE angiogram with pop and AT angioplasty on DAPT  02/26-On Milrinone and Bumex tolerating Hyd/Isos Plan for possible High risk PCI-oLM/LAD  02/27 Had HD yesterday for High risk PCI tomorrow  03/01-LVEDP~~25 on [facility] plan for dialisis over weekend to optimize fluid balance for High risk PCI on Monday    LE EDEMA no DVT      # JAMEL  Creatinine Trend: 3.01 <--3.39<--2.65<--2.34<--2.12<--, 1.89<- 3.02<--3.38<--(HD)-- 4.76<--4.07<---3.90<-- 1.17  Has had 3 sessions HD for interrmittent HD to allow contrast based procedures is non oliguric  Would dialyse today

## 2025-03-02 LAB
ANION GAP SERPL CALC-SCNC: 13 MMOL/L — SIGNIFICANT CHANGE UP (ref 5–17)
BUN SERPL-MCNC: 21 MG/DL — SIGNIFICANT CHANGE UP (ref 7–23)
CALCIUM SERPL-MCNC: 8.9 MG/DL — SIGNIFICANT CHANGE UP (ref 8.4–10.5)
CHLORIDE SERPL-SCNC: 96 MMOL/L — SIGNIFICANT CHANGE UP (ref 96–108)
CO2 SERPL-SCNC: 24 MMOL/L — SIGNIFICANT CHANGE UP (ref 22–31)
CREAT SERPL-MCNC: 2.23 MG/DL — HIGH (ref 0.5–1.3)
EGFR: 24 ML/MIN/1.73M2 — LOW
EGFR: 24 ML/MIN/1.73M2 — LOW
GLUCOSE BLDC GLUCOMTR-MCNC: 140 MG/DL — HIGH (ref 70–99)
GLUCOSE BLDC GLUCOMTR-MCNC: 163 MG/DL — HIGH (ref 70–99)
GLUCOSE BLDC GLUCOMTR-MCNC: 178 MG/DL — HIGH (ref 70–99)
GLUCOSE BLDC GLUCOMTR-MCNC: 186 MG/DL — HIGH (ref 70–99)
GLUCOSE SERPL-MCNC: 132 MG/DL — HIGH (ref 70–99)
HCT VFR BLD CALC: 26.7 % — LOW (ref 34.5–45)
HGB BLD-MCNC: 9.3 G/DL — LOW (ref 11.5–15.5)
MCHC RBC-ENTMCNC: 25.7 PG — LOW (ref 27–34)
MCHC RBC-ENTMCNC: 34.8 G/DL — SIGNIFICANT CHANGE UP (ref 32–36)
MCV RBC AUTO: 73.8 FL — LOW (ref 80–100)
NRBC BLD AUTO-RTO: 0 /100 WBCS — SIGNIFICANT CHANGE UP (ref 0–0)
PLATELET # BLD AUTO: 282 K/UL — SIGNIFICANT CHANGE UP (ref 150–400)
POTASSIUM SERPL-MCNC: 3.6 MMOL/L — SIGNIFICANT CHANGE UP (ref 3.5–5.3)
POTASSIUM SERPL-SCNC: 3.6 MMOL/L — SIGNIFICANT CHANGE UP (ref 3.5–5.3)
RBC # BLD: 3.62 M/UL — LOW (ref 3.8–5.2)
RBC # FLD: 21.2 % — HIGH (ref 10.3–14.5)
SODIUM SERPL-SCNC: 133 MMOL/L — LOW (ref 135–145)
WBC # BLD: 11.06 K/UL — HIGH (ref 3.8–10.5)
WBC # FLD AUTO: 11.06 K/UL — HIGH (ref 3.8–10.5)

## 2025-03-02 RX ORDER — HYDROMORPHONE/SOD CHLOR,ISO/PF 2 MG/10 ML
1 SYRINGE (ML) INJECTION EVERY 6 HOURS
Refills: 0 | Status: DISCONTINUED | OUTPATIENT
Start: 2025-03-02 | End: 2025-03-09

## 2025-03-02 RX ADMIN — BUMETANIDE 132 MILLIGRAM(S): 1 TABLET ORAL at 05:09

## 2025-03-02 RX ADMIN — INSULIN LISPRO 1: 100 INJECTION, SOLUTION INTRAVENOUS; SUBCUTANEOUS at 17:08

## 2025-03-02 RX ADMIN — Medication 650 MILLIGRAM(S): at 13:15

## 2025-03-02 RX ADMIN — CLOPIDOGREL BISULFATE 75 MILLIGRAM(S): 75 TABLET, FILM COATED ORAL at 12:36

## 2025-03-02 RX ADMIN — Medication 1 MILLIGRAM(S): at 06:10

## 2025-03-02 RX ADMIN — Medication 1 MILLIGRAM(S): at 13:00

## 2025-03-02 RX ADMIN — INSULIN LISPRO 5 UNIT(S): 100 INJECTION, SOLUTION INTRAVENOUS; SUBCUTANEOUS at 17:08

## 2025-03-02 RX ADMIN — LINAGLIPTIN 5 MILLIGRAM(S): 5 TABLET, FILM COATED ORAL at 12:36

## 2025-03-02 RX ADMIN — HEPARIN SODIUM 5000 UNIT(S): 1000 INJECTION INTRAVENOUS; SUBCUTANEOUS at 05:08

## 2025-03-02 RX ADMIN — INSULIN LISPRO 5 UNIT(S): 100 INJECTION, SOLUTION INTRAVENOUS; SUBCUTANEOUS at 09:40

## 2025-03-02 RX ADMIN — INSULIN LISPRO 5 UNIT(S): 100 INJECTION, SOLUTION INTRAVENOUS; SUBCUTANEOUS at 12:35

## 2025-03-02 RX ADMIN — ATORVASTATIN CALCIUM 40 MILLIGRAM(S): 80 TABLET, FILM COATED ORAL at 21:45

## 2025-03-02 RX ADMIN — Medication 1 MILLIGRAM(S): at 20:21

## 2025-03-02 RX ADMIN — INSULIN LISPRO 1: 100 INJECTION, SOLUTION INTRAVENOUS; SUBCUTANEOUS at 09:40

## 2025-03-02 RX ADMIN — Medication 650 MILLIGRAM(S): at 12:36

## 2025-03-02 RX ADMIN — INSULIN GLARGINE-YFGN 11 UNIT(S): 100 INJECTION, SOLUTION SUBCUTANEOUS at 21:46

## 2025-03-02 RX ADMIN — Medication 1 MILLIGRAM(S): at 13:20

## 2025-03-02 RX ADMIN — Medication 81 MILLIGRAM(S): at 12:36

## 2025-03-02 RX ADMIN — Medication 1 MILLIGRAM(S): at 12:35

## 2025-03-02 RX ADMIN — Medication 1 APPLICATION(S): at 09:38

## 2025-03-02 RX ADMIN — Medication 1 MILLIGRAM(S): at 05:08

## 2025-03-02 RX ADMIN — Medication 40 MILLIGRAM(S): at 12:36

## 2025-03-02 RX ADMIN — HEPARIN SODIUM 5000 UNIT(S): 1000 INJECTION INTRAVENOUS; SUBCUTANEOUS at 15:23

## 2025-03-02 RX ADMIN — Medication 1 MILLIGRAM(S): at 21:21

## 2025-03-02 NOTE — PROGRESS NOTE ADULT - ASSESSMENT
64F, hx of CHF (last TTE on 1/16/25 EF 30-35%, G2DD), DM, diabetic foot ulcer with a recent admission at [facility] for CHF exacerbation 1/14-1/17 p/w worsening R. leg pain and fluid secretion, was meeting sepsis criteria with podiatry having low suspicion for right cellulitis and admitted for continued management of HF exacerbation and needing ischemic eval.     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  -Left leg Arterial Duplex: Slightly tardus parvus waveform of the left popliteal artery is noted, for which underlying disease cannot be excluded. Posterior tibial artery waveform is nonpulsatile. Anterior tibial artery tardus parvus flow is noted.   Transferred to [facility] for CO2 angiogram as per vascular surgery    #  PAD Wound of lower extremity.   ·  Plan: Podiatry following, b/l serous bullae, no concerns for infection  Found to have RLE coolness  -Right Leg Arterial Duplex: Popliteal artery is occluded with negligible flow of right trifurcation arteries.  -Left leg Arterial Duplex: Slightly tardus parvus waveform of the left popliteal artery is noted, for which underlying disease cannot be excluded. Posterior tibial artery waveform is nonpulsatile. Anterior tibial artery tardus parvus flow is noted.  -Started on heparin gtt and dobutamine gtt -Will transfer to [facility] for CO2 angiogram as per vascular surgery as not candidate for CTA due to worsening SCr  -Keep compression dressing  -LE elevation above heart level at rest.  -Transferred to [facility] for CO2 angiogram   - Overall this patient is at   intermediate  risk (for cardiac death, nonfatal myocardial infarction, and nonfatal cardiac arrest perioperatively for this intermediate  risk procedure).   No cardiac contraindications for CO2 vangiogram  There  are  no further recommendation for risk stratifying imaging/stress testing prior to planned surgery  Seen by Podiatry-No acute pod intervention, local wound care only-         PAD with rest ischemia  s/p AT/Popliteal angioplasty on DAPT        # HFrEF (congestive heart failure). Ischemic Cardiomyopathy  ·  Plan: Hx of HF, previous admission in January, on home Lasix 40 qD, Metoprolol Succ 25 qD. Not on Entresto due to insurance issues  Last TTE: LVSF moderately decreased w/ EF 30-35 %. Moderate G2DD. Mild MR  Stress test: small-sized, moderate defect(s) in the apical wall that is predominantly fixed suggestive of an infarction with minimal marcus-infarct ischemia  Ischemic cardiomyopathy  GDMT on hold 2/2 JAMEL    Repeat TTE EF 20% with WMA RAP~~8  Likely Ischemic cardiomyopathy despite NST revealing fixed defectLHC confirming  Multivessel CAD  Remains on Milrinone add Hydrall/isosorbide      RHC: RA 20, PA 49/29 (40), PCWP 35, mVO2 51.1, CO/CI 3.8/2.2, SVR 1095    # CAD  [facility]-RCA , severe ostial LM disease, diffuse disease in LAD and LCx, some L to R collaterals-seen by CTS advise cMR for viability poor target vessels for CABG-?High risk LM/LAD PCI  Had cMR-LVEF is 25%, greater than 75% subendocardial scarring involving the basal to mid inferior wall of the left ventricle, left ventricular transmural scarring involving the apical septal wall and likely near transmural scarring of the apical lateral wall. 50% scarring of the left ventricular basal inferoseptal wall.  Will need High risk PCI vs CABG though less likely 2/2 poor target vessel    PLAN FOR High risk PCI today Dr Fair    Started on Milrinone to assist augmented diuresis in attempt to avoid further renal failure  Has lost Weight 170---> 164 Kg---> 161.1 Kg---> 165 Kg-->151.2---> 155 ---> 158 --> 148  --> 147  Kg      For HD catheter placement to initiate Dialysis  02/21-Started HD-500mL  02/22-HD 1000mL  02/22-For LE CO2 angiogram-No cardiac contraindications to proceeding with procedure  02/24-CO2 angiogram  02/25-s/p RLE angiogram with pop and AT angioplasty on DAPT  02/26-On Milrinone and Bumex tolerating Hyd/Isos Plan for possible High risk PCI-oLM/LAD  02/27 Had HD yesterday for High risk PCI tomorrow  03/01-LVEDP~~25 on [facility] plan for dialisis over weekend to optimize fluid balance for High risk PCI on Monday 03/02-For PCI tomorrow      LE EDEMA no DVT      # JAMEL  Creatinine Trend:2.23<-- 3.01 <--3.39<--2.65<--2.34<--2.12<--, 1.89<- 3.02<--3.38<--(HD)-- 4.76<--4.07<---3.90<-- 1.17  Has had 3 sessions HD for interrmittent HD to allow contrast based procedures is non oliguric  Had HD yesterday 1800mL removed

## 2025-03-02 NOTE — PROGRESS NOTE ADULT - SUBJECTIVE AND OBJECTIVE BOX
Name of Patient : TOMASZ ALBA  MRN: [id number]  Date of visit: 03-02-25    Subjective: Patient seen and examined. No new events except as noted.   HD   p[an for cath tomorrow     REVIEW OF SYSTEMS:    CONSTITUTIONAL: No weakness, fevers or chills  EYES/ENT: No visual changes;  No vertigo or throat pain   NECK: No pain or stiffness  RESPIRATORY: No cough, wheezing, hemoptysis; No shortness of breath  CARDIOVASCULAR: No chest pain or palpitations  GASTROINTESTINAL: No abdominal or epigastric pain. No nausea, vomiting, or hematemesis; No diarrhea or constipation. No melena or hematochezia.  GENITOURINARY: No dysuria, frequency or hematuria  NEUROLOGICAL: No numbness or weakness  SKIN: No itching, burning, rashes, or lesions   All other review of systems is negative unless indicated above.    MEDICATIONS:  MEDICATIONS  (STANDING):  aspirin enteric coated 81 milliGRAM(s) Oral daily  atorvastatin 40 milliGRAM(s) Oral at bedtime  benzocaine/menthol Lozenge 1 Lozenge Oral once  bisacodyl 5 milliGRAM(s) Oral every 12 hours  buMETAnide IVPB 4 milliGRAM(s) IV Intermittent daily  chlorhexidine 4% Liquid 1 Application(s) Topical <User Schedule>  clopidogrel Tablet 75 milliGRAM(s) Oral daily  clopidogrel Tablet      dextrose 5%. 1000 milliLiter(s) (100 mL/Hr) IV Continuous <Continuous>  dextrose 5%. 1000 milliLiter(s) (50 mL/Hr) IV Continuous <Continuous>  dextrose 50% Injectable 25 Gram(s) IV Push once  dextrose 50% Injectable 12.5 Gram(s) IV Push once  dextrose 50% Injectable 25 Gram(s) IV Push once  glucagon  Injectable 1 milliGRAM(s) IntraMuscular once  heparin   Injectable 5000 Unit(s) SubCutaneous every 8 hours  hydrALAZINE 10 milliGRAM(s) Oral three times a day  insulin glargine Injectable (LANTUS) 11 Unit(s) SubCutaneous at bedtime  insulin lispro (ADMELOG) corrective regimen sliding scale   SubCutaneous three times a day before meals  insulin lispro (ADMELOG) corrective regimen sliding scale   SubCutaneous at bedtime  insulin lispro Injectable (ADMELOG) 5 Unit(s) SubCutaneous three times a day with meals  isosorbide   dinitrate Tablet (ISORDIL) 5 milliGRAM(s) Oral three times a day  linagliptin 5 milliGRAM(s) Oral daily  milrinone Infusion 0.25 MICROgram(s)/kG/Min (5.41 mL/Hr) IV Continuous <Continuous>  pantoprazole  Injectable 40 milliGRAM(s) IV Push daily  polyethylene glycol 3350 17 Gram(s) Oral daily  senna 2 Tablet(s) Oral at bedtime      PHYSICAL EXAM:  T(C): 36.9 (03-02-25 @ 20:50), Max: 37.1 (03-02-25 @ 04:34)  HR: 105 (03-02-25 @ 20:50) (102 - 110)  BP: 94/56 (03-02-25 @ 20:50) (91/53 - 99/60)  RR: 18 (03-02-25 @ 20:50) (18 - 18)  SpO2: 97% (03-02-25 @ 20:50) (92% - 97%)  Wt(kg): --  I&O's Summary    01 Mar 2025 07:01  -  02 Mar 2025 07:00  --------------------------------------------------------  IN: 599.9 mL / OUT: 1950 mL / NET: -1350.1 mL    02 Mar 2025 07:01  -  02 Mar 2025 22:48  --------------------------------------------------------  IN: 629.9 mL / OUT: 350 mL / NET: 279.9 mL          Appearance: Normal	  HEENT:  PERRLA   Lymphatic: No lymphadenopathy   Cardiovascular: Normal S1 S2, no JVD  Respiratory: normal effort , clear  Gastrointestinal:  Soft, Non-tender  Skin: No rashes,  warm to touch  Psychiatry:  Mood & affect appropriate  Musculuskeletal: No edema    recent labs, Imaging and EKGs personally reviewed       03-01-25 @ 07:01  -  03-02-25 @ 07:00  --------------------------------------------------------  IN: 599.9 mL / OUT: 1950 mL / NET: -1350.1 mL    03-02-25 @ 07:01  -  03-02-25 @ 22:48  --------------------------------------------------------  IN: 629.9 mL / OUT: 350 mL / NET: 279.9 mL                          9.3    11.06 )-----------( 282      ( 02 Mar 2025 06:23 )             26.7               03-02    133[L]  |  96  |  21  ----------------------------<  132[H]  3.6   |  24  |  2.23[H]    Ca    8.9      02 Mar 2025 06:21                         Urinalysis Basic - ( 02 Mar 2025 06:21 )    Color: x / Appearance: x / SG: x / pH: x  Gluc: 132 mg/dL / Ketone: x  / Bili: x / Urobili: x   Blood: x / Protein: x / Nitrite: x   Leuk Esterase: x / RBC: x / WBC x   Sq Epi: x / Non Sq Epi: x / Bacteria: x

## 2025-03-02 NOTE — PROGRESS NOTE ADULT - ASSESSMENT
64y Female with history of CHF presents as a transfer for LE CO2 angiogram. Nephrology consulted for elevated Scr.    1) JAMEL: likely due to ATN and CRS for which patient initiated on RRT on 2/20 with last HD on 3/1 tolerated well with 1.8L removed. Will assess for additional HD on 3/3 post-LHC. On milrinone gtt as per HF. Monitor for renal recovery. Avoid nephrotoxins.    2) HTN: BP low normal. Continue with current medications. Will give midodrine prior to HD to avoid intradialytic hypotension.    3) LE edema: On bumex 4 mg IV daily. UF with HD. TTE with severely decreased LVSF. Monitor UO.     4) Anemia: Hb low with low TSAT. No IV iron given elevated ferritin. S/P Epo 8K X 1 dose 3/1. Monitor Hb.      [facility] NEPHROLOGY  Raji Waterman M.D.  Mars Feliz D.O.  Kelley Parks M.D.  MD Nancy Kulkarni, MSN, ANP-C    Telephone: [phone]  Facsimile: [phone]    [facility] [place] [address], [address]  [place] [address]

## 2025-03-02 NOTE — PROGRESS NOTE ADULT - SUBJECTIVE AND OBJECTIVE BOX
[facility] NEPHROLOGY- PROGRESS NOTE    64y Female with history of CHF presents as a transfer for LE CO2 angiogram. Nephrology consulted for elevated Scr.      REVIEW OF SYSTEMS:  Gen: no fevers  Cards: no chest pain  Resp: no dyspnea  GI: no nausea or vomiting or diarrhea  Vascular: + LE edema improving    Augmentin (Stomach Upset; Vomiting; Nausea)  No Known Allergies      Hospital Medications: Medications reviewed        VITALS:  T(F): 98.8 (03-02-25 @ 04:34), Max: 98.8 (03-02-25 @ 04:34)  HR: 110 (03-02-25 @ 04:34)  BP: 99/60 (03-02-25 @ 04:34)  RR: 18 (03-02-25 @ 04:34)  SpO2: 92% (03-02-25 @ 04:34)  Wt(kg): --    03-01 @ 07:01  -  03-02 @ 07:00  --------------------------------------------------------  IN: 599.9 mL / OUT: 1950 mL / NET: -1350.1 mL        PHYSICAL EXAM:    Gen: NAD, calm  Cards: RRR, +S1/S2, no M/G/R  Resp: CTA B/L  GI: soft, NT/ND, NABS  : + stiles with cloudy urine  Vascular: + LE wrapped B/L with RLE edema > LLE edema, + RIJ subclavian        LABS:  03-02    133[L]  |  96  |  21  ----------------------------<  132[H]  3.6   |  24  |  2.23[H]    Ca    8.9      02 Mar 2025 06:21      Creatinine Trend: 2.23 <--, 3.65 <--, 3.12 <--, 3.39 <--, 2.65 <--, 2.34 <--, 2.12 <--                        9.3    11.06 )-----------( 282      ( 02 Mar 2025 06:23 )             26.7     Urine Studies:  Urinalysis Basic - ( 02 Mar 2025 06:21 )    Color:  / Appearance:  / SG:  / pH:   Gluc: 132 mg/dL / Ketone:   / Bili:  / Urobili:    Blood:  / Protein:  / Nitrite:    Leuk Esterase:  / RBC:  / WBC    Sq Epi:  / Non Sq Epi:  / Bacteria:

## 2025-03-02 NOTE — PROGRESS NOTE ADULT - SUBJECTIVE AND OBJECTIVE BOX
MR#[id number]  PATIENT NAME:COLE ALBA    DATE OF SERVICE: 25 @ 08:30  Patient was seen and examined by Yordy Gilmore MD on    25 @ 08:30 .  Interim events noted.Consultant notes ,Labs,Telemetry reviewed by me       HOSPITAL COURSE: HPI:  64F, hx of CHF (last TTE on 25 EF 30-35%, G2DD), DM, diabetic foot ulcer with a recent admission at [facility] for CHF exacerbation presented as a transfer for worsening R. leg pain and fluid secretion, On non-invasive imaging demonstrating severe depletion of RLE blood flow beyond the popliteal vessel at knee and similar findings in L. Was transferred to [facility] for CO2 angiogram with Dr. Baltazar. (2025 20:05)      INTERIM EVENTS:Patient seen at bedside ,interim events noted.  -Had cath Multivessel CAD started on Milrinone for CTS evaluation albeit targets not optimal  02/15-Awake on Milrinone and Bumex gtt-seen by CTS not a surgical candidate-needs Vascular work-up for LE PAD-scheduled for Angiogram on Wednesday    Weight 170Kg---> 164 Kg--->161.1 Kg---151.2 ---> 155 --> 154.7 ---> 158 --> 148--->147  Kg    -s/p RLE angiogram with pop and AT angioplasty   -Awake had iHD plan for High risk PCI oLM/LAD tomorrow 1000mL removed  Had ? pAF ~~3 secs  -Awake no dyspnea chest pain plan for PCI-oLM/LAD todat HD after PCI-Sinus rhythm  -Had C PCWP-15 still elevated with low BP Plan to dialyse over weekend and plan for PCI on Monday-No dyspnea  -Awake had HD removed 1500Ml no dyspnea Plan for PCI tomorrow    PMH -reviewed admission note, no change since admission  HEART FAILURE: Acute[x ]Chronic[ ] Systolic[x ] Diastolic[ ] Combined Systolic and Diastolic[ ]  CAD[x ] CABG[ ] PCI[ ]  DEVICES[ ] PPM[ ] ICD[ ] ILR[ ]  ATRIAL FIBRILLATION[ ] Paroxysmal[ ] Permanent[ ] CHADS2-[  ]  JAMEL[x ] CKD1[ ] CKD2[ ] CKD3[ ] CKD4[x ] ESRD[ ]  COPD[ ] HTN[x ]   DM[x ] Type1[ ] Type 2[x ]   CVA[ ] Paresis[ ]    AMBULATION: Assisted[x ] Cane/walker[ ] Independent[ ]    MEDICATIONS  (STANDING):  aspirin enteric coated 81 milliGRAM(s) Oral daily  atorvastatin 40 milliGRAM(s) Oral at bedtime  benzocaine/menthol Lozenge 1 Lozenge Oral once  bisacodyl 5 milliGRAM(s) Oral every 12 hours  buMETAnide IVPB 4 milliGRAM(s) IV Intermittent daily  chlorhexidine 4% Liquid 1 Application(s) Topical <User Schedule>  clopidogrel Tablet 75 milliGRAM(s) Oral daily  clopidogrel Tablet      heparin   Injectable 5000 Unit(s) SubCutaneous every 8 hours  hydrALAZINE 10 milliGRAM(s) Oral three times a day  insulin glargine Injectable (LANTUS) 11 Unit(s) SubCutaneous at bedtime  insulin lispro (ADMELOG) corrective regimen sliding scale   SubCutaneous three times a day before meals  insulin lispro (ADMELOG) corrective regimen sliding scale   SubCutaneous at bedtime  insulin lispro Injectable (ADMELOG) 5 Unit(s) SubCutaneous three times a day with meals  isosorbide   dinitrate Tablet (ISORDIL) 5 milliGRAM(s) Oral three times a day  linagliptin 5 milliGRAM(s) Oral daily  milrinone Infusion 0.25 MICROgram(s)/kG/Min (5.41 mL/Hr) IV Continuous <Continuous>  pantoprazole  Injectable 40 milliGRAM(s) IV Push daily  polyethylene glycol 3350 17 Gram(s) Oral daily  senna 2 Tablet(s) Oral at bedtime    MEDICATIONS  (PRN):  acetaminophen     Tablet .. 650 milliGRAM(s) Oral every 6 hours PRN Mild Pain (1 - 3)  dextrose Oral Gel 15 Gram(s) Oral once PRN Blood Glucose LESS THAN 70 milliGRAM(s)/deciliter  HYDROmorphone  Injectable 1 milliGRAM(s) IV Push every 6 hours PRN Severe Pain (7 - 10)  melatonin 3 milliGRAM(s) Oral at bedtime PRN Insomnia  ondansetron Injectable 4 milliGRAM(s) IV Push every 6 hours PRN Nausea and/or Vomiting  sodium chloride 0.65% Nasal 1 Spray(s) Both Nostrils three times a day PRN Dryness  sodium chloride 0.9% lock flush 10 milliLiter(s) IV Push every 1 hour PRN Pre/post blood products, medications, blood draw, and to maintain line patency            REVIEW OF SYSTEMS:  Constitutional: [ ] fever, [ ]weight loss,  [ x]fatigue [ ]weight gain  Eyes: [ ] visual changes  Respiratory: [ ]shortness of breath;  [ ] cough, [ ]wheezing, [ ]chills, [ ]hemoptysis  Cardiovascular: [ ] chest pain, [ ]palpitations, [ ]dizziness,  [ ]leg swelling[ ]orthopnea[ ]PND  Gastrointestinal: [ ] abdominal pain, [ ]nausea, [ ]vomiting,  [ ]diarrhea [ ]Constipation [ ]Melena  Genitourinary: [ ] dysuria, [ ] hematuria [ ][place]  Neurologic: [ ] headaches [ ] tremors[ ]weakness [ ]Paralysis Right[ ] Left[ ]  Skin: [ ] itching, [ ]burning, [ ] rashes  Endocrine: [ ] heat or cold intolerance  Musculoskeletal: [ ] joint pain or swelling; [ ] muscle, back, or extremity pain  Psychiatric: [ ] depression, [ ]anxiety, [ ]mood swings, or [ ]difficulty sleeping  Hematologic: [ ] easy bruising, [ ] bleeding gums    [ ] All remaining systems negative except as per above.   [ ]Unable to obtain.  [x] No change in ROS since admission      Vital Signs Last 24 Hrs  T(C): 37.1 (02 Mar 2025 04:34), Max: 37.1 (02 Mar 2025 04:34)  T(F): 98.8 (02 Mar 2025 04:34), Max: 98.8 (02 Mar 2025 04:34)  HR: 110 (02 Mar 2025 04:34) (94 - 111)  BP: 99/60 (02 Mar 2025 04:34) (94/53 - 108/61)  RR: 18 (02 Mar 2025 04:34) (18 - 18)  SpO2: 92% (02 Mar 2025 04:34) (92% - 97%)    Parameters below as of 02 Mar 2025 04:34  Patient On (Oxygen Delivery Method): room air      I&O's Summary    01 Mar 2025 07:01  -  02 Mar 2025 07:00  --------------------------------------------------------  IN: 599.9 mL / OUT: 1950 mL / NET: -1350.1 mL        PHYSICAL EXAM:  General: No acute distress BMI-25  HEENT: EOMI, PERRL  Neck: Supple, [ ] JVD  Lungs: Equal air entry bilaterally; [ ] rales [ ] wheezing [ ] rhonchi  Heart: Regular rate and rhythm; [x ] murmur   2/6 [ x] systolic [ ] diastolic [ ] radiation[ ] rubs [ ]  gallops  Abdomen: Nontender, bowel sounds present  Extremities: No clubbing, cyanosis, [ ] edema [x Warm, well perfused        RLE: Dressings in place, blistering and ulceration on the dorsal aspect of the foot        LLE: First digit dry gangrene on the plantar aspect  ]Nervous system:  Alert & Oriented X3, no focal deficits  Psychiatric: Normal affect  Skin: No rashes or lesions    LABS:      133[L]  |  96  |  21  ----------------------------<  132[H]  3.6   |  24  |  2.23[H]    Ca    8.9      02 Mar 2025 06:21      Creatinine Trend: 2.23<--, 3.65<--, 3.12<--, 3.39<--, 2.65<--, 2.34<--                        9.3    11.06 )-----------( 282      ( 02 Mar 2025 06:23 )             26.7           TTE Limited W or WO Ultrasound Enhancing Agent (25 @ 12:39) >  CONCLUSIONS:      1. Limited TTE to assess for MR, IVC, LVOT, TR.   2. Left ventricular systolic function is severely decreased.   3. Reduced right ventricular systolic function.   4. LVOT VTI 12.0cm, Stroke volume 35cc. LVOT diameter 1.9cm.   5. Mild to moderate tricuspid regurgitation.   6. Estimated pulmonary artery systolic pressure is 32 mmHg, consistent with normal pulmonary artery pressure.   7. The inferior vena cava is normal in size measuring 1.70 cm in diameter, (normal <2.1cm) with abnormal inspiratory collapse (abnormal <50%) consistent with mildly elevated right atrial pressure (~8, range 5-10mmHg).   8. Compared to the transthoracic echocardiogram performed on 2/10/2025, RV and LV function still .         Xray Chest 1 View AP/PA (25 @ 18:05) >    IMPRESSION:  No focal consolidations.  Mildpulmonary vascular congestion.        Cardiac Catheterization (25 @ 14:24) >  Diagnostic Conclusions:     Right dominant coronary anatomy with severe multivessel disease (high syntax score)  LM   Left main artery: There is a 70 % stenosis in the middle third portion of the segment.    LAD   Left anterior descending artery: Angiography shows severe atherosclerosis. There is a 90 % stenosis in the middle third portion of the segment.      CX   Circumflex: Angiography shows severe atherosclerosis. There is an 80 % stenosis in the ostium portion of the segment.    RCA   Right coronary artery: The distal vessel is supplied by limited collaterals from the Left anterior descending artery. There is a 100 % total occlusion stenosis in the proximal third portion of the segment.      Mild-moderate post capillary pulmonary hypertension, with reduced cardiac output/index  Elevated RA (22mmHg) and PCWP (35mmHg) pressures. Elevated V waves on  PCWP tracing (44mmHg)    MR Cardiac w/wo IV Cont (25 @ 09:44) >  IMPRESSION:.    The left ventricle demonstrates severe wall motion abnormalities and  function is depressed (calculated LVEF is 25%).    There is greater than 75% subendocardial scarring involving the basal to  mid inferior wall of the left ventricle.    There is left ventricular transmural scarring involving the apical septal  wall and likely near transmural scarring of the apical lateral wall.    There is about 50% scarring of the left ventricular basal inferoseptal  wall.

## 2025-03-02 NOTE — PROGRESS NOTE ADULT - ASSESSMENT
65 YO F with PMHx of HFrEF 30-35 and grade 2 ddfxn (last TTE on 01/16/25), DM2, and diabetic foot ulcer. Recent admission at [facility] for ADHF. Patient now represents to OSH and ultimately to [facility] as a transfer for worsening right leg pain and fluid secretion. As per documentation, patient was reported to have severe depletion of RLE blood flow beyond the popliteal vessel at knee and similar findings in the left. Patient was transferred to [facility] for CO2 angiogram with Dr. Baltazar. Of note, patient also with reported visual disturbance for which patient was seen and evaluated by opthalmology. Internal Medicine has been consulted on Ms. Kirby's care for medical management.     # ADHF/ BiV HFrEF 20   - Recent admission at [facility] for ADHF and on dobutamine outpatient  - TTE 1/16 with EF 30-35 with moderately reduced LVSF and grade 2 ddfxn, normal RVSF , trace TR/ MA, and trace pericardial effusion  - NST abnormal with small-sized, moderate defect in apical wall that is predominantly fixed suggestive of an infarction with minimal marcus-infarct ischemia. Post stress LVEF 25.  - CT Chest with small BL pleural effusions and small pericardial effusion.  - RPT TTE with EF 20, severely decreased LV, decreased RVSF with TAPSE 1.2, and regional wall motion abnormalities present with entire septum, entire apex, entire inferior wall, mid inferolateral segment, and mid anterolateral segment are hypokinetic.   - RHC with RA 20, PA 49/29 (40), PCWP 35, mVO2 51.1, CO/CI 3.8/2.2, SVR 1095 w/ Multivessel CAD   - s/p zaroxyln 10 QD  - CRE and sodium rising and bumex GTT stopped 2/18 and HTS stopped 2/16   - RPT limited TTE w/ LVSF severely decreased, reduced RVSF, LVOT VTI 12, mild to mod TR, and mildly elevated right atrial pressure  - Continue on milrinone gtt as per Cardio   - Continue on hydral 25 and isordil 5  - HF and cards following and adjusting medications   - Case discussed with EP and needs to be on GDMT for 3 months before AICD placement and should be stabilized off of inotropic support.   - Monitor I and O, diuresis per Cardio/ Renal    - GDMT as per Cardio -> on Hydralazine, Isosorbide   - Monitor volume status   - Check daily weights    - Monitor on telemetry; Monitor electrolytes   - Cardio, HF, Renal, and EP evals appreciated; F/u recs   - high risk PCI    # CAD with TVD   - [facility] with RCA , severe ostial LM disease, diffuse disease in LAD and LCx, some L to R collaterals (Multivessel CAD)  - Case discussed with CTSx and NOT a candidate for CABG due to comorbidities  - Cardiac MRI with greater than 75% subendocardial scarring of the basal to mid-inferior wall of the LV and 50% scarring of the left ventricular basal inferoseptal wall. There is also left ventricular transmural scarring involving the apical septal wall and likely near transmural scarring of the apical lateral wall.  - Plan for RPT [facility] with high risk PCI , plan for today   - AS and Statin   - Cardiology following     # BL LE Edema likely in setting of ADHF  - Diuresis as per Cardio, HF and Renal   - BL LE elevation and compression  - LE Duplex neg   - Monitor for now  - Podiatry and Vascular evals appreciated; F/u recs -->S/P angio w/ angioplasty with vascular, on DAPT     # Pericardial effusion likely in setting ADHF  - TTE with trace pericardial effusion   - CT Chest with small pericardial effusion   - Denies chest pain, palpitations, chest tightness or discomfort, shortness of breath or dyspnea   - Repeat TTE in 1 month for further evaluation   - Diuresis per cardio/ renal.  - Monitor on telemetry  - Cardio following    # Pleural effusion likely in setting ADHF  - CT Chest with small BL pleural effusions  - Monitor O2 saturation  - Supplement to maintain > 90%   - Diuresis per cardio/ renal.     # JAMEL with Hyponatremia and Metabolic Acidosis   - Baseline CRE 0.7-1.2 and increased to ~4  - As per OSH documentation, JAMEL was thought to be second to Unasyn/ Bumex / Entresto use and Hypotension   - US RENAL with no hydronephrosis  - Likely cardiorenal induced with low flow state in ADHF, however now rising again and concern for milrinone vs overdiuresis (prerenal) vs cardiorenal.   - Milrinone adjusted and diuresis turned off, however no improvement/ worsened in renal function noted.   - s/p shiley placement and started on HD 2/20  - c/w HD per renal   - c/w high dose bumex 4mg IV QD, however remains oliguric .   - HF and renal following   - Avoid nephrotoxic agents  - Monitor Cr and daily BMP     # Transaminitis  - Likely 2/2 hepatic congestion   - Volume removal with HD as tolerated  - Trend LFTs, if uptrend post-HD initiation, check ABD US  - Serial ABD Examinations    # Hypernatremia   - Hypernatremia likely from HTS vs over diuresis/ intravascular dehydration   - Hold further HTS   - Sodium improved   - Monitor sodium     # Leukocytosis likely in setting of BL LE ulcers with pop occlusion   - BCx negative   - UCx with probable contamination   - On unasyn for ABX. Frequency increased to Q12 as per Renal   - Leukocytosis fluctuating, however appears nontoxic with no fever spikes and monitoring closely on below unasyn.   - If febrile check pan cultures   - Trend CBC, temp curve, VS and adjust as tolerated    # Foot ulcers with worsening RLE pain second to pop artery occlusion +/- diabetic ulcers   - RLE Arterial Duplex with popliteal artery occlusion   - LLE Arterial Duplex with underlying disease cannot be excluded   - s/p angio with pop and AT VERA on 2/24   - Continue on Lipitor  - Continue on DAPT  - Continue pain control with oxy  - Wound care called for dressing recs   - Vascular following,     # Tachycardia likely pain related   - On Toprol and improved  - Continue to monitor on tele   - Cardio eval appreciated    # Visual Disturbance  - CT Head w/ old infarcts  - On ASA and Statin   - Opthalmology eval appreciated    # Indigestion and Constipation   - PPI, miralax and senna continued  - Monitor BMs    # Anemia likely mixed AOCD vs iron deficiency   - HH stable in 9s on HSQ  - Anemia panel with AOCD   - Started on venofer, but ferritin high and now stopped   - Continue on EPO with HD  - Monitor HH   - Transfuse for Hgb < 8  - Maintain active T/S    # Diabetes Mellitus A1C 11.6   - Continue on lantus 10 with tradjecta 5 and ISS   - Diabetic DASH diet   - Monitor and adjust glucose levels PRN   - Endocrine following; F/u recs     # HLD and HLD  - Continue on lipitor and toprol  - Monitor BP    # DISPO TBD  - Palliative care called and patient remains FULL CODE

## 2025-03-03 LAB
ANION GAP SERPL CALC-SCNC: 15 MMOL/L — SIGNIFICANT CHANGE UP (ref 5–17)
BUN SERPL-MCNC: 26 MG/DL — HIGH (ref 7–23)
CALCIUM SERPL-MCNC: 9 MG/DL — SIGNIFICANT CHANGE UP (ref 8.4–10.5)
CHLORIDE SERPL-SCNC: 90 MMOL/L — LOW (ref 96–108)
CO2 SERPL-SCNC: 24 MMOL/L — SIGNIFICANT CHANGE UP (ref 22–31)
CREAT SERPL-MCNC: 2.55 MG/DL — HIGH (ref 0.5–1.3)
EGFR: 21 ML/MIN/1.73M2 — LOW
EGFR: 21 ML/MIN/1.73M2 — LOW
GLUCOSE BLDC GLUCOMTR-MCNC: 103 MG/DL — HIGH (ref 70–99)
GLUCOSE BLDC GLUCOMTR-MCNC: 113 MG/DL — HIGH (ref 70–99)
GLUCOSE BLDC GLUCOMTR-MCNC: 121 MG/DL — HIGH (ref 70–99)
GLUCOSE BLDC GLUCOMTR-MCNC: 191 MG/DL — HIGH (ref 70–99)
GLUCOSE BLDC GLUCOMTR-MCNC: 93 MG/DL — SIGNIFICANT CHANGE UP (ref 70–99)
GLUCOSE SERPL-MCNC: 188 MG/DL — HIGH (ref 70–99)
HCT VFR BLD CALC: 29.5 % — LOW (ref 34.5–45)
HGB BLD-MCNC: 10.2 G/DL — LOW (ref 11.5–15.5)
MAGNESIUM SERPL-MCNC: 1.8 MG/DL — SIGNIFICANT CHANGE UP (ref 1.6–2.6)
MCHC RBC-ENTMCNC: 25.3 PG — LOW (ref 27–34)
MCHC RBC-ENTMCNC: 34.6 G/DL — SIGNIFICANT CHANGE UP (ref 32–36)
MCV RBC AUTO: 73.2 FL — LOW (ref 80–100)
NRBC BLD AUTO-RTO: 0 /100 WBCS — SIGNIFICANT CHANGE UP (ref 0–0)
PHOSPHATE SERPL-MCNC: 2 MG/DL — LOW (ref 2.5–4.5)
PLATELET # BLD AUTO: 273 K/UL — SIGNIFICANT CHANGE UP (ref 150–400)
POTASSIUM SERPL-MCNC: 3.9 MMOL/L — SIGNIFICANT CHANGE UP (ref 3.5–5.3)
POTASSIUM SERPL-SCNC: 3.9 MMOL/L — SIGNIFICANT CHANGE UP (ref 3.5–5.3)
RBC # BLD: 4.03 M/UL — SIGNIFICANT CHANGE UP (ref 3.8–5.2)
RBC # FLD: 21.9 % — HIGH (ref 10.3–14.5)
SODIUM SERPL-SCNC: 129 MMOL/L — LOW (ref 135–145)
WBC # BLD: 11.71 K/UL — HIGH (ref 3.8–10.5)
WBC # FLD AUTO: 11.71 K/UL — HIGH (ref 3.8–10.5)

## 2025-03-03 PROCEDURE — 99233 SBSQ HOSP IP/OBS HIGH 50: CPT | Mod: GC

## 2025-03-03 PROCEDURE — 99232 SBSQ HOSP IP/OBS MODERATE 35: CPT

## 2025-03-03 RX ORDER — SOD PHOS DI, MONO/K PHOS MONO 250 MG
1 TABLET ORAL
Refills: 0 | Status: COMPLETED | OUTPATIENT
Start: 2025-03-03 | End: 2025-03-04

## 2025-03-03 RX ORDER — MIDODRINE HYDROCHLORIDE 5 MG/1
10 TABLET ORAL ONCE
Refills: 0 | Status: COMPLETED | OUTPATIENT
Start: 2025-03-03 | End: 2025-03-03

## 2025-03-03 RX ORDER — INSULIN GLARGINE-YFGN 100 [IU]/ML
12 INJECTION, SOLUTION SUBCUTANEOUS AT BEDTIME
Refills: 0 | Status: DISCONTINUED | OUTPATIENT
Start: 2025-03-03 | End: 2025-03-05

## 2025-03-03 RX ADMIN — INSULIN LISPRO 5 UNIT(S): 100 INJECTION, SOLUTION INTRAVENOUS; SUBCUTANEOUS at 12:54

## 2025-03-03 RX ADMIN — BUMETANIDE 132 MILLIGRAM(S): 1 TABLET ORAL at 05:13

## 2025-03-03 RX ADMIN — Medication 1 APPLICATION(S): at 08:42

## 2025-03-03 RX ADMIN — INSULIN LISPRO 5 UNIT(S): 100 INJECTION, SOLUTION INTRAVENOUS; SUBCUTANEOUS at 08:41

## 2025-03-03 RX ADMIN — Medication 1 MILLIGRAM(S): at 06:13

## 2025-03-03 RX ADMIN — Medication 1 PACKET(S): at 17:22

## 2025-03-03 RX ADMIN — INSULIN GLARGINE-YFGN 12 UNIT(S): 100 INJECTION, SOLUTION SUBCUTANEOUS at 21:37

## 2025-03-03 RX ADMIN — Medication 1 MILLIGRAM(S): at 22:19

## 2025-03-03 RX ADMIN — HEPARIN SODIUM 5000 UNIT(S): 1000 INJECTION INTRAVENOUS; SUBCUTANEOUS at 15:10

## 2025-03-03 RX ADMIN — Medication 40 MILLIGRAM(S): at 11:43

## 2025-03-03 RX ADMIN — Medication 650 MILLIGRAM(S): at 18:38

## 2025-03-03 RX ADMIN — ATORVASTATIN CALCIUM 40 MILLIGRAM(S): 80 TABLET, FILM COATED ORAL at 21:20

## 2025-03-03 RX ADMIN — MIDODRINE HYDROCHLORIDE 10 MILLIGRAM(S): 5 TABLET ORAL at 18:38

## 2025-03-03 RX ADMIN — HEPARIN SODIUM 5000 UNIT(S): 1000 INJECTION INTRAVENOUS; SUBCUTANEOUS at 05:12

## 2025-03-03 RX ADMIN — Medication 1 MILLIGRAM(S): at 21:19

## 2025-03-03 RX ADMIN — HEPARIN SODIUM 5000 UNIT(S): 1000 INJECTION INTRAVENOUS; SUBCUTANEOUS at 21:20

## 2025-03-03 RX ADMIN — LINAGLIPTIN 5 MILLIGRAM(S): 5 TABLET, FILM COATED ORAL at 11:51

## 2025-03-03 RX ADMIN — Medication 81 MILLIGRAM(S): at 11:50

## 2025-03-03 RX ADMIN — POLYETHYLENE GLYCOL 3350 17 GRAM(S): 17 POWDER, FOR SOLUTION ORAL at 11:51

## 2025-03-03 RX ADMIN — CLOPIDOGREL BISULFATE 75 MILLIGRAM(S): 75 TABLET, FILM COATED ORAL at 11:50

## 2025-03-03 RX ADMIN — Medication 1 MILLIGRAM(S): at 05:13

## 2025-03-03 RX ADMIN — INSULIN LISPRO 1: 100 INJECTION, SOLUTION INTRAVENOUS; SUBCUTANEOUS at 08:40

## 2025-03-03 RX ADMIN — INSULIN LISPRO 5 UNIT(S): 100 INJECTION, SOLUTION INTRAVENOUS; SUBCUTANEOUS at 17:17

## 2025-03-03 RX ADMIN — Medication 1 MILLIGRAM(S): at 11:38

## 2025-03-03 NOTE — PROGRESS NOTE ADULT - ASSESSMENT
64y Female with history of CHF presents as a transfer for LE CO2 angiogram. Nephrology consulted for elevated Scr.    1) JAMEL: likely due to ATN and CRS for which patient initiated on RRT on 2/20 with last HD on 3/1 tolerated well with 1.8L removed. Plan for additional UF to optimize volume status prior to LHC on 3/4. On milrinone gtt as per HF. Monitor for renal recovery. Avoid nephrotoxins.    2) HTN: BP low normal. Continue with current medications. Will give midodrine prior to HD to avoid intradialytic hypotension.    3) LE edema: On bumex 4 mg IV daily. UF with HD. TTE with severely decreased LVSF. Monitor UO.     4) Anemia: Hb improving with low TSAT. No IV iron given elevated ferritin. S/P Epo 8K X 1 dose 3/1. Monitor Hb.      [facility] NEPHROLOGY  Raji Waterman M.D.  Mars Feliz D.O.  Kelley Parks M.D.  MD Nancy Kulkarni, MSN, ANP-C    Telephone: [phone]  Facsimile: [phone]    [address] [place] [address], [address]  [place] [address]

## 2025-03-03 NOTE — PROGRESS NOTE ADULT - ASSESSMENT
64F, hx of CHF (last TTE on 1/16/25 EF 30-35%, G2DD), DM, diabetic foot ulcer with a recent admission at [facility] for CHF exacerbation 1/14-1/17 p/w worsening R. leg pain and fluid secretion, was meeting sepsis criteria with podiatry having low suspicion for right cellulitis and admitted for continued management of HF exacerbation and needing ischemic eval.     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  -Left leg Arterial Duplex: Slightly tardus parvus waveform of the left popliteal artery is noted, for which underlying disease cannot be excluded. Posterior tibial artery waveform is nonpulsatile. Anterior tibial artery tardus parvus flow is noted.   Transferred to [facility] for CO2 angiogram as per vascular surgery    #  PAD Wound of lower extremity.   ·  Plan: Podiatry following, b/l serous bullae, no concerns for infection  Found to have RLE coolness  -Right Leg Arterial Duplex: Popliteal artery is occluded with negligible flow of right trifurcation arteries.  -Left leg Arterial Duplex: Slightly tardus parvus waveform of the left popliteal artery is noted, for which underlying disease cannot be excluded. Posterior tibial artery waveform is nonpulsatile. Anterior tibial artery tardus parvus flow is noted.  -Started on heparin gtt and dobutamine gtt -Will transfer to [facility] for CO2 angiogram as per vascular surgery as not candidate for CTA due to worsening SCr  -Keep compression dressing  -LE elevation above heart level at rest.  -Transferred to [facility] for CO2 angiogram   - Overall this patient is at   intermediate  risk (for cardiac death, nonfatal myocardial infarction, and nonfatal cardiac arrest perioperatively for this intermediate  risk procedure).   No cardiac contraindications for CO2 vangiogram  There  are  no further recommendation for risk stratifying imaging/stress testing prior to planned surgery  Seen by Podiatry-No acute pod intervention, local wound care only-         PAD with rest ischemia  s/p AT/Popliteal angioplasty on DAPT        # HFrEF (congestive heart failure). Ischemic Cardiomyopathy  ·  Plan: Hx of HF, previous admission in January, on home Lasix 40 qD, Metoprolol Succ 25 qD. Not on Entresto due to insurance issues  Last TTE: LVSF moderately decreased w/ EF 30-35 %. Moderate G2DD. Mild MR  Stress test: small-sized, moderate defect(s) in the apical wall that is predominantly fixed suggestive of an infarction with minimal marcus-infarct ischemia  Ischemic cardiomyopathy  GDMT on hold 2/2 JAMEL    Repeat TTE EF 20% with WMA RAP~~8  Likely Ischemic cardiomyopathy despite NST revealing fixed defectLHC confirming  Multivessel CAD  Remains on Milrinone add Hydrall/isosorbide      RHC: RA 20, PA 49/29 (40), PCWP 35, mVO2 51.1, CO/CI 3.8/2.2, SVR 1095    # CAD  [facility]-RCA , severe ostial LM disease, diffuse disease in LAD and LCx, some L to R collaterals-seen by CTS advise cMR for viability poor target vessels for CABG-?High risk LM/LAD PCI  Had cMR-LVEF is 25%, greater than 75% subendocardial scarring involving the basal to mid inferior wall of the left ventricle, left ventricular transmural scarring involving the apical septal wall and likely near transmural scarring of the apical lateral wall. 50% scarring of the left ventricular basal inferoseptal wall.  Will need High risk PCI vs CABG though less likely 2/2 poor target vessel    PLAN FOR High risk PCI today Dr Fair    Started on Milrinone to assist augmented diuresis in attempt to avoid further renal failure  Has lost Weight 170---> 164 Kg---> 161.1 Kg---> 165 Kg-->151.2---> 155 ---> 158 --> 148  --> 147  Kg      For HD catheter placement to initiate Dialysis  02/21-Started HD-500mL  02/22-HD 1000mL  02/22-For LE CO2 angiogram-No cardiac contraindications to proceeding with procedure  02/24-CO2 angiogram  02/25-s/p RLE angiogram with pop and AT angioplasty on DAPT  02/26-On Milrinone and Bumex tolerating Hyd/Isos Plan for possible High risk PCI-oLM/LAD  02/27 Had HD yesterday for High risk PCI tomorrow  03/01-LVEDP~~25 on [facility] plan for dialisis over weekend to optimize fluid balance for High risk PCI on Monday 03/02-For PCI tomorrow    03/03-For High risk PCI oLM/LAD today    LE EDEMA no DVT      # JAMEL  Creatinine Trend:2.23<-- 3.01 <--3.39<--2.65<--2.34<--2.12<--, 1.89<- 3.02<--3.38<--(HD)-- 4.76<--4.07<---3.90<-- 1.17  Has had 3 sessions HD for interrmittent HD to allow contrast based procedures is non oliguric  Had HD 3/2 1800mL removed

## 2025-03-03 NOTE — PROGRESS NOTE ADULT - SUBJECTIVE AND OBJECTIVE BOX
[facility] NEPHROLOGY- PROGRESS NOTE    64y Female with history of CHF presents as a transfer for LE CO2 angiogram. Nephrology consulted for elevated Scr.      REVIEW OF SYSTEMS:  Gen: no fevers  Cards: no chest pain  Resp: no dyspnea  GI: no nausea or vomiting or diarrhea  Vascular: + LE edema improving    Augmentin (Stomach Upset; Vomiting; Nausea)  No Known Allergies      Hospital Medications: Medications reviewed        VITALS:  T(F): 97.7 (03-03-25 @ 10:54), Max: 98.5 (03-03-25 @ 04:02)  HR: 109 (03-03-25 @ 11:59)  BP: 99/62 (03-03-25 @ 11:59)  RR: 17 (03-03-25 @ 10:54)  SpO2: 97% (03-03-25 @ 10:54)  Wt(kg): --    03-02 @ 07:01  -  03-03 @ 07:00  --------------------------------------------------------  IN: 629.9 mL / OUT: 350 mL / NET: 279.9 mL          PHYSICAL EXAM:    Gen: NAD, calm  Cards: RRR, +S1/S2, no M/G/R  Resp: CTA B/L  GI: soft, NT/ND, NABS  : + stiles with cloudy urine  Vascular: + LE wrapped B/L with RLE edema > LLE edema, + RIJ subclavian        LABS:  03-03    129[L]  |  90[L]  |  26[H]  ----------------------------<  188[H]  3.9   |  24  |  2.55[H]    Ca    9.0      03 Mar 2025 06:25  Phos  2.0     03-03  Mg     1.8     03-03      Creatinine Trend: 2.55 <--, 2.23 <--, 3.65 <--, 3.12 <--, 3.39 <--, 2.65 <--, 2.34 <--                        10.2   11.71 )-----------( 273      ( 03 Mar 2025 06:25 )             29.5     Urine Studies:  Urinalysis Basic - ( 03 Mar 2025 06:25 )    Color:  / Appearance:  / SG:  / pH:   Gluc: 188 mg/dL / Ketone:   / Bili:  / Urobili:    Blood:  / Protein:  / Nitrite:    Leuk Esterase:  / RBC:  / WBC    Sq Epi:  / Non Sq Epi:  / Bacteria:

## 2025-03-03 NOTE — PROGRESS NOTE ADULT - ASSESSMENT
63 YO F with PMHx of HFrEF 30-35 and grade 2 ddfxn (last TTE on 01/16/25), DM2, and diabetic foot ulcer. Recent admission at [facility] for ADHF. Patient now represents to OSH and ultimately to [facility] as a transfer for worsening right leg pain and fluid secretion. As per documentation, patient was reported to have severe depletion of RLE blood flow beyond the popliteal vessel at knee and similar findings in the left. Patient was transferred to [facility] for CO2 angiogram with Dr. Baltazar. Of note, patient also with reported visual disturbance for which patient was seen and evaluated by opthalmology. Internal Medicine has been consulted on Ms. Kirby's care for medical management.     # ADHF/ BiV HFrEF 20   - Recent admission at [facility] for ADHF and on dobutamine outpatient  - TTE 1/16 with EF 30-35 with moderately reduced LVSF and grade 2 ddfxn, normal RVSF , trace TR/ OK, and trace pericardial effusion  - NST abnormal with small-sized, moderate defect in apical wall that is predominantly fixed suggestive of an infarction with minimal marcus-infarct ischemia. Post stress LVEF 25.  - CT Chest with small BL pleural effusions and small pericardial effusion.  - RPT TTE with EF 20, severely decreased LV, decreased RVSF with TAPSE 1.2, and regional wall motion abnormalities present with entire septum, entire apex, entire inferior wall, mid inferolateral segment, and mid anterolateral segment are hypokinetic.   - RHC with RA 20, PA 49/29 (40), PCWP 35, mVO2 51.1, CO/CI 3.8/2.2, SVR 1095 w/ Multivessel CAD   - s/p zaroxyln 10 QD  - CRE and sodium rising and bumex GTT stopped 2/18 and HTS stopped 2/16   - RPT limited TTE w/ LVSF severely decreased, reduced RVSF, LVOT VTI 12, mild to mod TR, and mildly elevated right atrial pressure  - Continues on milrinone gtt as per Cardio   - Continue on hydral 25 and isordil 5  - HF and cards following and adjusting medications   - Case discussed with EP and needs to be on GDMT for 3 months before AICD placement and should be stabilized off of inotropic support.   - Monitor I and O, diuresis per Cardio/ Renal    - GDMT as per Cardio -> on Hydralazine, Isosorbide   - Monitor volume status   - Check daily weights    - Monitor on telemetry; Monitor electrolytes   - Cardio, HF, Renal, and EP evals appreciated; F/u recs   - Planned for high risk PCI today.     # CAD with TVD   - [facility] with RCA , severe ostial LM disease, diffuse disease in LAD and LCx, some L to R collaterals (Multivessel CAD)  - Case discussed with CTSx and NOT a candidate for CABG due to comorbidities  - Cardiac MRI with greater than 75% subendocardial scarring of the basal to mid-inferior wall of the LV and 50% scarring of the left ventricular basal inferoseptal wall. There is also left ventricular transmural scarring involving the apical septal wall and likely near transmural scarring of the apical lateral wall.  - Plan for RPT [facility] with high risk PCI, plan for today   - AS and Statin   - Cardiology following     # BL LE Edema likely in setting of ADHF  - Diuresis as per Cardio, HF and Renal   - BL LE elevation and compression  - LE Duplex neg   - Monitor for now  - Podiatry and Vascular evals appreciated; F/u recs -->S/P angio w/ angioplasty with vascular, on DAPT     # Hyponatremia  - F/u labs post HD. Likely 2/2 volume overload    - Monitor Na level; void overcorrection > 6-8 mEq in 24 hours  - Renal eval appreciated; F/u recs    # Pericardial effusion likely in setting ADHF  - TTE with trace pericardial effusion   - CT Chest with small pericardial effusion   - Denies chest pain, palpitations, chest tightness or discomfort, shortness of breath or dyspnea   - Repeat TTE in 1 month for further evaluation   - Diuresis per cardio/ renal.  - Monitor on telemetry  - Cardio following    # Pleural effusion likely in setting ADHF  - CT Chest with small BL pleural effusions  - Monitor O2 saturation  - Supplement to maintain > 90%   - Diuresis per cardio/ renal.     # JAMEL with Hyponatremia and Metabolic Acidosis   - Baseline CRE 0.7-1.2 and increased to ~4  - As per OSH documentation, JAMEL was thought to be second to Unasyn/ Bumex / Entresto use and Hypotension   - US RENAL with no hydronephrosis  - Likely cardiorenal induced with low flow state in ADHF, however now rising again and concern for milrinone vs overdiuresis (prerenal) vs cardiorenal.   - Milrinone adjusted and diuresis turned off, however no improvement/ worsened in renal function noted.   - s/p shiley placement and started on HD 2/20  - c/w HD per renal   - c/w high dose bumex 4mg IV QD, however remains oliguric .   - HF and renal following   - Avoid nephrotoxic agents  - Monitor Cr and daily BMP     # Transaminitis  - Likely 2/2 hepatic congestion   - Volume removal with HD as tolerated  - Trend LFTs, if uptrend post-HD initiation, check ABD US  - Serial ABD Examinations    # Hypernatremia   - Hypernatremia likely from HTS vs over diuresis/ intravascular dehydration   - Monitor sodium     # Leukocytosis likely in setting of BL LE ulcers with pop occlusion   - BCx negative   - UCx with probable contamination   - S/P unasyn for ABX.   - Leukocytosis fluctuating, however appears nontoxic with no fever spikes and monitoring closely on below unasyn.   - If febrile check pan cultures   - Trend CBC, temp curve, VS and adjust as tolerated    # Foot ulcers with worsening RLE pain second to pop artery occlusion +/- diabetic ulcers   - RLE Arterial Duplex with popliteal artery occlusion   - LLE Arterial Duplex with underlying disease cannot be excluded   - s/p angio with pop and AT VERA on 2/24   - Continue on Lipitor  - Continue on DAPT  - Continue pain control with oxy  - Wound care called for dressing recs   - Vascular following,     # Tachycardia likely pain related   - On Toprol and improved  - Continue to monitor on tele   - Cardio eval appreciated    # Visual Disturbance  - CT Head w/ old infarcts  - On ASA and Statin   - Opthalmology eval appreciated    # Indigestion and Constipation   - PPI, miralax and senna continued  - Monitor BMs    # Anemia likely mixed AOCD vs iron deficiency   - HH stable in 9s on HSQ  - Anemia panel with AOCD   - Started on venofer, but ferritin high and now stopped   - Continue on EPO with HD  - Monitor HH   - Transfuse for Hgb < 8  - Maintain active T/S    # Diabetes Mellitus A1C 11.6   - Continue on lantus 10 with tradjecta 5 and ISS   - Diabetic DASH diet   - Monitor and adjust glucose levels PRN   - Endocrine following; F/u recs     # HLD and HLD  - Continue on lipitor and toprol  - Monitor BP    # DISPO TBD  - Palliative care called and patient remains FULL CODE       Discussed with Attending

## 2025-03-03 NOTE — PROGRESS NOTE ADULT - PROBLEM SELECTOR PLAN 2
-c/w ASA, plavix  -cMRI with no viability  -per Dr. Fair, plan for high risk PCI -c/w ASA, plavix  -cMRI with no viability  -per Dr. Fair, plan for high risk PCI scheduled tentatively for 3/4/25

## 2025-03-03 NOTE — PROGRESS NOTE ADULT - SUBJECTIVE AND OBJECTIVE BOX
Seen earlier today     Chief Complaint: Diabetes Mellitus follow up    INTERVAL HX: " Pain in my legs" Noted RN was administering pain medication at the time of visit.  Tolerating POs, usually eats well but didn't eat much this am due to pain. Last 24 hour bgs 113-191 with fasting        Review of Systems:  General: As above  GI: No nausea, vomiting  Endocrine: no  S&Sx of hypoglycemia    Allergies    No Known Allergies    Intolerances    Augmentin (Stomach Upset; Vomiting; Nausea)    MEDICATIONS  (STANDING):  aspirin enteric coated 81 milliGRAM(s) Oral daily  atorvastatin 40 milliGRAM(s) Oral at bedtime  benzocaine/menthol Lozenge 1 Lozenge Oral once  bisacodyl 5 milliGRAM(s) Oral every 12 hours  buMETAnide IVPB 4 milliGRAM(s) IV Intermittent daily  chlorhexidine 4% Liquid 1 Application(s) Topical <User Schedule>  clopidogrel Tablet 75 milliGRAM(s) Oral daily  clopidogrel Tablet      dextrose 5%. 1000 milliLiter(s) (50 mL/Hr) IV Continuous <Continuous>  dextrose 5%. 1000 milliLiter(s) (100 mL/Hr) IV Continuous <Continuous>  dextrose 50% Injectable 25 Gram(s) IV Push once  dextrose 50% Injectable 12.5 Gram(s) IV Push once  dextrose 50% Injectable 25 Gram(s) IV Push once  glucagon  Injectable 1 milliGRAM(s) IntraMuscular once  heparin   Injectable 5000 Unit(s) SubCutaneous every 8 hours  insulin glargine Injectable (LANTUS) 12 Unit(s) SubCutaneous at bedtime  insulin lispro (ADMELOG) corrective regimen sliding scale   SubCutaneous three times a day before meals  insulin lispro (ADMELOG) corrective regimen sliding scale   SubCutaneous at bedtime  insulin lispro Injectable (ADMELOG) 5 Unit(s) SubCutaneous three times a day with meals  isosorbide   dinitrate Tablet (ISORDIL) 5 milliGRAM(s) Oral three times a day  linagliptin 5 milliGRAM(s) Oral daily  midodrine. 10 milliGRAM(s) Oral once  milrinone Infusion 0.25 MICROgram(s)/kG/Min (5.41 mL/Hr) IV Continuous <Continuous>  pantoprazole  Injectable 40 milliGRAM(s) IV Push daily  polyethylene glycol 3350 17 Gram(s) Oral daily  potassium phosphate / sodium phosphate Powder (PHOS-NaK) 1 Packet(s) Oral three times a day before meals  senna 2 Tablet(s) Oral at bedtime      atorvastatin   40 milliGRAM(s) Oral (03-02-25 @ 21:45)    insulin glargine Injectable (LANTUS)   11 Unit(s) SubCutaneous (03-02-25 @ 21:46)    insulin lispro (ADMELOG) corrective regimen sliding scale   1 Unit(s) SubCutaneous (03-03-25 @ 08:40)    insulin lispro Injectable (ADMELOG)   5 Unit(s) SubCutaneous (03-03-25 @ 17:17)   5 Unit(s) SubCutaneous (03-03-25 @ 12:54)   5 Unit(s) SubCutaneous (03-03-25 @ 08:41)    linagliptin   5 milliGRAM(s) Oral (03-03-25 @ 11:51)        PHYSICAL EXAM:  VITALS: T(C): 36.5 (03-03-25 @ 10:54)  T(F): 97.7 (03-03-25 @ 10:54), Max: 98.5 (03-03-25 @ 04:02)  HR: 109 (03-03-25 @ 11:59) (105 - 117)  BP: 99/62 (03-03-25 @ 11:59) (90/51 - 99/62)  RR:  (17 - 18)  SpO2:  (97% - 97%)  Wt(kg): --  GENERAL: Female sitting in chair, in NAD  Respiratory: Respirations unlabored   Extremities: Warm, B/L leg ACE wrap   NEURO: Alert , appropriate     LABS:  POCT Blood Glucose.: 113 mg/dL (03-03-25 @ 16:57)  POCT Blood Glucose.: 121 mg/dL (03-03-25 @ 12:49)  POCT Blood Glucose.: 93 mg/dL (03-03-25 @ 12:14)  POCT Blood Glucose.: 191 mg/dL (03-03-25 @ 08:18)  POCT Blood Glucose.: 186 mg/dL (03-02-25 @ 21:22)  POCT Blood Glucose.: 178 mg/dL (03-02-25 @ 16:51)  POCT Blood Glucose.: 140 mg/dL (03-02-25 @ 12:25)  POCT Blood Glucose.: 163 mg/dL (03-02-25 @ 09:07)  POCT Blood Glucose.: 111 mg/dL (03-01-25 @ 21:24)  POCT Blood Glucose.: 140 mg/dL (03-01-25 @ 19:02)  POCT Blood Glucose.: 152 mg/dL (03-01-25 @ 12:25)  POCT Blood Glucose.: 157 mg/dL (03-01-25 @ 08:31)  POCT Blood Glucose.: 136 mg/dL (02-28-25 @ 23:53)                          10.2   11.71 )-----------( 273      ( 03 Mar 2025 06:25 )             29.5     03-03    129[L]  |  90[L]  |  26[H]  ----------------------------<  188[H]  3.9   |  24  |  2.55[H]    Ca    9.0      03 Mar 2025 06:25  Phos  2.0     03-03  Mg     1.8     03-03      eGFR: 21 mL/min/1.73m2 (03 Mar 2025 06:25)    01-24 Chol 122 Direct LDL -- LDL calculated 66 HDL 39[L] Trig 89  Thyroid Function Tests:      A1C with Estimated Average Glucose Result: 11.6 % (01-24-25 @ 05:40)  A1C with Estimated Average Glucose Result: >15.5 % (12-13-24 @ 06:49)    Estimated Average Glucose: 286 mg/dL (01-24-25 @ 05:40)  Estimated Average Glucose: >398 mg/dL (12-13-24 @ 06:49)        Diet, Regular:   Consistent Carbohydrate No Snacks (CSTCHO)  Low Sodium  No Concentrated Phosphorus  Halal  Lacto Veg (Accepts Milk & Milk Products) (02-24-25 @ 14:13) [Active]

## 2025-03-03 NOTE — PROGRESS NOTE ADULT - SUBJECTIVE AND OBJECTIVE BOX
MR#[id number]  PATIENT NAME:COLE ALBA    DATE OF SERVICE: 25 @ 06:20  Patient was seen and examined by Yordy Gilmore MD on    25 @ 06:20 .  Interim events noted.Consultant notes ,Labs,Telemetry reviewed by me       HOSPITAL COURSE: HPI:  64F, hx of CHF (last TTE on 25 EF 30-35%, G2DD), DM, diabetic foot ulcer with a recent admission at [facility] for CHF exacerbation presented as a transfer for worsening R. leg pain and fluid secretion, On non-invasive imaging demonstrating severe depletion of RLE blood flow beyond the popliteal vessel at knee and similar findings in L. Was transferred to [facility] for CO2 angiogram with Dr. Baltazar. (2025 20:05)      INTERIM EVENTS:Patient seen at bedside ,interim events noted.  -Had cath Multivessel CAD started on Milrinone for CTS evaluation albeit targets not optimal  02/15-Awake on Milrinone and Bumex gtt-seen by CTS not a surgical candidate-needs Vascular work-up for LE PAD-scheduled for Angiogram on Wednesday    Weight 170Kg---> 164 Kg--->161.1 Kg---151.2 ---> 155 --> 154.7 ---> 158 --> 148--->147  Kg    -s/p RLE angiogram with pop and AT angioplasty   -Awake had iHD plan for High risk PCI oLM/LAD tomorrow 1000mL removed  Had ? pAF ~~3 secs  -Awake no dyspnea chest pain plan for PCI-oLM/LAD todat HD after PCI-Sinus rhythm  -Had C PCWP-15 still elevated with low BP Plan to dialyse over weekend and plan for PCI on Monday-No dyspnea  -Awake had HD removed 1500Ml no dyspnea Plan for PCI tomorrow    -Awake Sinus rhythm no dyspnea FOR High risk PCI oLM/LAD today    PMH -reviewed admission note, no change since admission  HEART FAILURE: Acute[x ]Chronic[ ] Systolic[x ] Diastolic[ ] Combined Systolic and Diastolic[ ]  CAD[x ] CABG[ ] PCI[ ]  DEVICES[ ] PPM[ ] ICD[ ] ILR[ ]  ATRIAL FIBRILLATION[ ] Paroxysmal[ ] Permanent[ ] CHADS2-[  ]  JAMEL[x ] CKD1[ ] CKD2[ ] CKD3[ ] CKD4[x ] ESRD[ ]  COPD[ ] HTN[x ]   DM[x ] Type1[ ] Type 2[x ]   CVA[ ] Paresis[ ]    AMBULATION: Assisted[x ] Cane/walker[ ] Independent[ ]    MEDICATIONS  (STANDING):  aspirin enteric coated 81 milliGRAM(s) Oral daily  atorvastatin 40 milliGRAM(s) Oral at bedtime  benzocaine/menthol Lozenge 1 Lozenge Oral once  bisacodyl 5 milliGRAM(s) Oral every 12 hours  buMETAnide IVPB 4 milliGRAM(s) IV Intermittent daily  chlorhexidine 4% Liquid 1 Application(s) Topical <User Schedule>  clopidogrel Tablet 75 milliGRAM(s) Oral daily  clopidogrel Tablet      glucagon  Injectable 1 milliGRAM(s) IntraMuscular once  heparin   Injectable 5000 Unit(s) SubCutaneous every 8 hours  hydrALAZINE 10 milliGRAM(s) Oral three times a day  insulin glargine Injectable (LANTUS) 11 Unit(s) SubCutaneous at bedtime  insulin lispro (ADMELOG) corrective regimen sliding scale   SubCutaneous three times a day before meals  insulin lispro (ADMELOG) corrective regimen sliding scale   SubCutaneous at bedtime  insulin lispro Injectable (ADMELOG) 5 Unit(s) SubCutaneous three times a day with meals  isosorbide   dinitrate Tablet (ISORDIL) 5 milliGRAM(s) Oral three times a day  linagliptin 5 milliGRAM(s) Oral daily  milrinone Infusion 0.25 MICROgram(s)/kG/Min (5.41 mL/Hr) IV Continuous <Continuous>  pantoprazole  Injectable 40 milliGRAM(s) IV Push daily  polyethylene glycol 3350 17 Gram(s) Oral daily  senna 2 Tablet(s) Oral at bedtime    MEDICATIONS  (PRN):  acetaminophen     Tablet .. 650 milliGRAM(s) Oral every 6 hours PRN Mild Pain (1 - 3)  dextrose Oral Gel 15 Gram(s) Oral once PRN Blood Glucose LESS THAN 70 milliGRAM(s)/deciliter  HYDROmorphone  Injectable 1 milliGRAM(s) IV Push every 6 hours PRN Severe Pain (7 - 10)  melatonin 3 milliGRAM(s) Oral at bedtime PRN Insomnia  ondansetron Injectable 4 milliGRAM(s) IV Push every 6 hours PRN Nausea and/or Vomiting  sodium chloride 0.65% Nasal 1 Spray(s) Both Nostrils three times a day PRN Dryness  sodium chloride 0.9% lock flush 10 milliLiter(s) IV Push every 1 hour PRN Pre/post blood products, medications, blood draw, and to maintain line patency            REVIEW OF SYSTEMS:  Constitutional: [ ] fever, [ ]weight loss,  [ x]fatigue [ ]weight gain  Eyes: [ ] visual changes  Respiratory: [ ]shortness of breath;  [ ] cough, [ ]wheezing, [ ]chills, [ ]hemoptysis  Cardiovascular: [ ] chest pain, [ ]palpitations, [ ]dizziness,  [ ]leg swelling[ ]orthopnea[ ]PND  Gastrointestinal: [ ] abdominal pain, [ ]nausea, [ ]vomiting,  [ ]diarrhea [ ]Constipation [ ]Melena  Genitourinary: [ ] dysuria, [ ] hematuria [ ][place]  Neurologic: [ ] headaches [ ] tremors[ ]weakness [ ]Paralysis Right[ ] Left[ ]  Skin: [ ] itching, [ ]burning, [ ] rashes  Endocrine: [ ] heat or cold intolerance  Musculoskeletal: [ ] joint pain or swelling; [ ] muscle, back, or extremity pain  Psychiatric: [ ] depression, [ ]anxiety, [ ]mood swings, or [ ]difficulty sleeping  Hematologic: [ ] easy bruising, [ ] bleeding gums    [ ] All remaining systems negative except as per above.   [ ]Unable to obtain.  [x] No change in ROS since admission      Vital Signs Last 24 Hrs  T(C): 36.9 (03 Mar 2025 04:02), Max: 36.9 (02 Mar 2025 20:50)  T(F): 98.5 (03 Mar 2025 04:02), Max: 98.5 (03 Mar 2025 04:02)  HR: 117 (03 Mar 2025 04:02) (102 - 117)  BP: 90/51 (03 Mar 2025 04:02) (90/51 - 95/55)  RR: 18 (03 Mar 2025 04:02) (18 - 18)  SpO2: 97% (03 Mar 2025 04:02) (97% - 97%)    Parameters below as of 03 Mar 2025 04:02  Patient On (Oxygen Delivery Method): room air      I&O's Summary    01 Mar 2025 07:01  -  02 Mar 2025 07:00  --------------------------------------------------------  IN: 599.9 mL / OUT: 1950 mL / NET: -1350.1 mL    02 Mar 2025 07:01  -  03 Mar 2025 06:20  --------------------------------------------------------  IN: 629.9 mL / OUT: 350 mL / NET: 279.9 mL        PHYSICAL EXAM:  General: No acute distress BMI-27  HEENT: EOMI, PERRL  Neck: Supple, [ ] JVD  Lungs: Equal air entry bilaterally; [ ] rales [ ] wheezing [ ] rhonchi  Heart: Regular rate and rhythm; [x ] murmur   2/6 [ x] systolic [ ] diastolic [ ] radiation[ ] rubs [ ]  gallops  Abdomen: Nontender, bowel sounds present  Extremities: No clubbing, cyanosis, [ ] edema [ x]Warm, well perfused        RLE: Dressings in place, blistering and ulceration on the dorsal aspect of the foot        LLE: First digit dry gangrene on the plantar aspect  Nervous system:  Alert & Oriented X3, no focal deficits  Psychiatric: Normal affect  Skin: No rashes or lesions    LABS:      133[L]  |  96  |  21  ----------------------------<  132[H]  3.6   |  24  |  2.23[H]    Ca    8.9      02 Mar 2025 06:21      Creatinine Trend: 2.23<--, 3.65<--, 3.12<--, 3.39<--, 2.65<--, 2.34<--                        9.3    11.06 )-----------( 282      ( 02 Mar 2025 06:23 )             26.7               TTE Limited W or WO Ultrasound Enhancing Agent (25 @ 12:39) >  CONCLUSIONS:      1. Limited TTE to assess for MR, IVC, LVOT, TR.   2. Left ventricular systolic function is severely decreased.   3. Reduced right ventricular systolic function.   4. LVOT VTI 12.0cm, Stroke volume 35cc. LVOT diameter 1.9cm.   5. Mild to moderate tricuspid regurgitation.   6. Estimated pulmonary artery systolic pressure is 32 mmHg, consistent with normal pulmonary artery pressure.   7. The inferior vena cava is normal in size measuring 1.70 cm in diameter, (normal <2.1cm) with abnormal inspiratory collapse (abnormal <50%) consistent with mildly elevated right atrial pressure (~8, range 5-10mmHg).   8. Compared to the transthoracic echocardiogram performed on 2/10/2025, RV and LV function still .         Xray Chest 1 View AP/PA (25 @ 18:05) >    IMPRESSION:  No focal consolidations.  Mildpulmonary vascular congestion.        Cardiac Catheterization (25 @ 14:24) >  Diagnostic Conclusions:     Right dominant coronary anatomy with severe multivessel disease (high syntax score)  LM   Left main artery: There is a 70 % stenosis in the middle third portion of the segment.    LAD   Left anterior descending artery: Angiography shows severe atherosclerosis. There is a 90 % stenosis in the middle third portion of the segment.      CX   Circumflex: Angiography shows severe atherosclerosis. There is an 80 % stenosis in the ostium portion of the segment.    RCA   Right coronary artery: The distal vessel is supplied by limited collaterals from the Left anterior descending artery. There is a 100 % total occlusion stenosis in the proximal third portion of the segment.      Mild-moderate post capillary pulmonary hypertension, with reduced cardiac output/index  Elevated RA (22mmHg) and PCWP (35mmHg) pressures. Elevated V waves on  PCWP tracing (44mmHg)    MR Cardiac w/wo IV Cont (25 @ 09:44) >  IMPRESSION:.    The left ventricle demonstrates severe wall motion abnormalities and  function is depressed (calculated LVEF is 25%).    There is greater than 75% subendocardial scarring involving the basal to  mid inferior wall of the left ventricle.    There is left ventricular transmural scarring involving the apical septal  wall and likely near transmural scarring of the apical lateral wall.    There is about 50% scarring of the left ventricular basal inferoseptal  wall.

## 2025-03-03 NOTE — PROGRESS NOTE ADULT - SUBJECTIVE AND OBJECTIVE BOX
Subjective:    Medications:  acetaminophen     Tablet .. 650 milliGRAM(s) Oral every 6 hours PRN  aspirin enteric coated 81 milliGRAM(s) Oral daily  atorvastatin 40 milliGRAM(s) Oral at bedtime  benzocaine/menthol Lozenge 1 Lozenge Oral once  bisacodyl 5 milliGRAM(s) Oral every 12 hours  buMETAnide IVPB 4 milliGRAM(s) IV Intermittent daily  chlorhexidine 4% Liquid 1 Application(s) Topical <User Schedule>  clopidogrel Tablet 75 milliGRAM(s) Oral daily  clopidogrel Tablet      dextrose 5%. 1000 milliLiter(s) IV Continuous <Continuous>  dextrose 5%. 1000 milliLiter(s) IV Continuous <Continuous>  dextrose 50% Injectable 25 Gram(s) IV Push once  dextrose 50% Injectable 12.5 Gram(s) IV Push once  dextrose 50% Injectable 25 Gram(s) IV Push once  dextrose Oral Gel 15 Gram(s) Oral once PRN  glucagon  Injectable 1 milliGRAM(s) IntraMuscular once  heparin   Injectable 5000 Unit(s) SubCutaneous every 8 hours  hydrALAZINE 10 milliGRAM(s) Oral three times a day  HYDROmorphone  Injectable 1 milliGRAM(s) IV Push every 6 hours PRN  insulin glargine Injectable (LANTUS) 11 Unit(s) SubCutaneous at bedtime  insulin lispro (ADMELOG) corrective regimen sliding scale   SubCutaneous three times a day before meals  insulin lispro (ADMELOG) corrective regimen sliding scale   SubCutaneous at bedtime  insulin lispro Injectable (ADMELOG) 5 Unit(s) SubCutaneous three times a day with meals  isosorbide   dinitrate Tablet (ISORDIL) 5 milliGRAM(s) Oral three times a day  linagliptin 5 milliGRAM(s) Oral daily  melatonin 3 milliGRAM(s) Oral at bedtime PRN  midodrine. 10 milliGRAM(s) Oral once  milrinone Infusion 0.25 MICROgram(s)/kG/Min IV Continuous <Continuous>  ondansetron Injectable 4 milliGRAM(s) IV Push every 6 hours PRN  pantoprazole  Injectable 40 milliGRAM(s) IV Push daily  polyethylene glycol 3350 17 Gram(s) Oral daily  potassium phosphate / sodium phosphate Powder (PHOS-NaK) 1 Packet(s) Oral three times a day before meals  senna 2 Tablet(s) Oral at bedtime  sodium chloride 0.65% Nasal 1 Spray(s) Both Nostrils three times a day PRN  sodium chloride 0.9% lock flush 10 milliLiter(s) IV Push every 1 hour PRN      Physical Exam:    Vitals:  Vital Signs Last 24 Hours  T(C): 36.5 (03-03-25 @ 10:54), Max: 36.9 (03-02-25 @ 20:50)  HR: 109 (03-03-25 @ 11:59) (102 - 117)  BP: 99/62 (03-03-25 @ 11:59) (90/51 - 99/62)  RR: 17 (03-03-25 @ 10:54) (17 - 18)  SpO2: 97% (03-03-25 @ 10:54) (97% - 97%)        I&O's Summary    02 Mar 2025 07:01  -  03 Mar 2025 07:00  --------------------------------------------------------  IN: 629.9 mL / OUT: 350 mL / NET: 279.9 mL        Tele:    General: No distress. Comfortable.  HEENT: EOM intact.  Neck: Neck supple. JVP not elevated. No masses  Chest: Clear to auscultation bilaterally  CV: Normal S1 and S2. No murmurs, rub, or gallops. Radial pulses normal.  Abdomen: Soft, non-distended, non-tender  Skin: No rashes or skin breakdown  Neurology: Alert and oriented times three. Sensation intact  Psych: Affect normal    Labs:                        10.2   11.71 )-----------( 273      ( 03 Mar 2025 06:25 )             29.5     03-03    129[L]  |  90[L]  |  26[H]  ----------------------------<  188[H]  3.9   |  24  |  2.55[H]    Ca    9.0      03 Mar 2025 06:25  Phos  2.0     03-03  Mg     1.8     03-03                     Subjective: no acute events overnight. Patient continues to be volume optimized with inotrope assisted diuresis. Continues to endorse b/l LE pain, however denies CP, SOB, palpitations, N/V, dizziness or lightheadedness.     Medications:  acetaminophen     Tablet .. 650 milliGRAM(s) Oral every 6 hours PRN  aspirin enteric coated 81 milliGRAM(s) Oral daily  atorvastatin 40 milliGRAM(s) Oral at bedtime  benzocaine/menthol Lozenge 1 Lozenge Oral once  bisacodyl 5 milliGRAM(s) Oral every 12 hours  buMETAnide IVPB 4 milliGRAM(s) IV Intermittent daily  chlorhexidine 4% Liquid 1 Application(s) Topical <User Schedule>  clopidogrel Tablet 75 milliGRAM(s) Oral daily  clopidogrel Tablet      dextrose 5%. 1000 milliLiter(s) IV Continuous <Continuous>  dextrose 5%. 1000 milliLiter(s) IV Continuous <Continuous>  dextrose 50% Injectable 25 Gram(s) IV Push once  dextrose 50% Injectable 12.5 Gram(s) IV Push once  dextrose 50% Injectable 25 Gram(s) IV Push once  dextrose Oral Gel 15 Gram(s) Oral once PRN  glucagon  Injectable 1 milliGRAM(s) IntraMuscular once  heparin   Injectable 5000 Unit(s) SubCutaneous every 8 hours  hydrALAZINE 10 milliGRAM(s) Oral three times a day  HYDROmorphone  Injectable 1 milliGRAM(s) IV Push every 6 hours PRN  insulin glargine Injectable (LANTUS) 11 Unit(s) SubCutaneous at bedtime  insulin lispro (ADMELOG) corrective regimen sliding scale   SubCutaneous three times a day before meals  insulin lispro (ADMELOG) corrective regimen sliding scale   SubCutaneous at bedtime  insulin lispro Injectable (ADMELOG) 5 Unit(s) SubCutaneous three times a day with meals  isosorbide   dinitrate Tablet (ISORDIL) 5 milliGRAM(s) Oral three times a day  linagliptin 5 milliGRAM(s) Oral daily  melatonin 3 milliGRAM(s) Oral at bedtime PRN  midodrine. 10 milliGRAM(s) Oral once  milrinone Infusion 0.25 MICROgram(s)/kG/Min IV Continuous <Continuous>  ondansetron Injectable 4 milliGRAM(s) IV Push every 6 hours PRN  pantoprazole  Injectable 40 milliGRAM(s) IV Push daily  polyethylene glycol 3350 17 Gram(s) Oral daily  potassium phosphate / sodium phosphate Powder (PHOS-NaK) 1 Packet(s) Oral three times a day before meals  senna 2 Tablet(s) Oral at bedtime  sodium chloride 0.65% Nasal 1 Spray(s) Both Nostrils three times a day PRN  sodium chloride 0.9% lock flush 10 milliLiter(s) IV Push every 1 hour PRN      Physical Exam:    Vitals:  Vital Signs Last 24 Hours  T(C): 36.5 (03-03-25 @ 10:54), Max: 36.9 (03-02-25 @ 20:50)  HR: 109 (03-03-25 @ 11:59) (102 - 117)  BP: 99/62 (03-03-25 @ 11:59) (90/51 - 99/62)  RR: 17 (03-03-25 @ 10:54) (17 - 18)  SpO2: 97% (03-03-25 @ 10:54) (97% - 97%)    I&O's Summary    02 Mar 2025 07:01  -  03 Mar 2025 07:00  --------------------------------------------------------  IN: 629.9 mL / OUT: 350 mL / NET: 279.9 mL    General: Ill appearing female. No distress. Comfortable.  HEENT: EOM intact.  Neck: Neck supple. JVP not elevated. No masses  Chest: Clear to auscultation bilaterally  CV: Normal S1 and S2. II/VI systolic murmur, No rub, or gallops. Radial pulses normal.  Abdomen: Soft, non-distended, non-tender  Skin: Bandages wrapped around b/l LE. Skin warm and dry.  Neurology: Alert and oriented times three. Sensation intact  Psych: Affect normal    Labs:                        10.2   11.71 )-----------( 273      ( 03 Mar 2025 06:25 )             29.5     03-03    129[L]  |  90[L]  |  26[H]  ----------------------------<  188[H]  3.9   |  24  |  2.55[H]    Ca    9.0      03 Mar 2025 06:25  Phos  2.0     03-03  Mg     1.8     03-03

## 2025-03-03 NOTE — PROGRESS NOTE ADULT - ASSESSMENT
63y/o F w/h/o uncontrolled T2DM (A1C 11.6%) on Tresiba and CeQur insulin patch PTA. DM c/b PAD, retinopathy, CKD, CAD. Also h/oType 2 DM, HTN, HLD. Presented to OSH with worsening right leg pain and fluid secretion. Transferred to [facility] for CO2 angiogram. Found to have Low EF to 20% in OSBALDO. Endocrine consulted for Type 2 DM management, uncontrolled.   Tolerating POs, usually eats well but didn't eat much this am due to pain. Last 24 hour bgs 113-191 with fasting . Fasting BG above goal with prandial BGs at goal today. Patient to continue on oral Tradjenta 5mg once daily. Endocrine will closely monitor BG and adjust insulin as needed for BG goal 100-180mg/dL inpatient.       #uncontrolled Type 2 diabetes mellitus   A1C with Estimated Average Glucose Result: 11.6 % (01-24-25 @ 05:40)  A1C with Estimated Average Glucose Result: >15.5 % (12-13-24 @ 06:49)    Home regimen: Tresiba 40 units, CeQur insulin patch about 2-10 units TID with meals

## 2025-03-03 NOTE — PROGRESS NOTE ADULT - PROBLEM SELECTOR PLAN 4
nephrology following, plan for iHD  watching for renal recovery -nephrology following, plan for iHD  -watching for renal recovery

## 2025-03-03 NOTE — PROGRESS NOTE ADULT - SUBJECTIVE AND OBJECTIVE BOX
Name of Patient : TOMASZ ALBA  MRN: [id number]  Date of visit: 03-03-25 @ 13:53      Subjective: Patient seen and examined. No new events except as noted.   Patient seen earlier this AM. Lying down in bed. For high risk PCI today     CONSTITUTIONAL: Generalized weakness   EYES/ENT: No visual changes;  No vertigo or throat pain   NECK: No pain or stiffness  RESPIRATORY:  No wheezing, hemoptysis; No shortness of breath  CARDIOVASCULAR: No chest pain or palpitations  GASTROINTESTINAL: No abdominal or epigastric pain. No nausea, vomiting, or hematemesis; No diarrhea or constipation. No melena or hematochezia.  GENITOURINARY: No dysuria, frequency or hematuria  NEUROLOGICAL: No numbness or weakness  SKIN/ MSK; B/L LE wounds; RLE S/P Angio w/ angioplasty    MEDICATIONS:  MEDICATIONS  (STANDING):  aspirin enteric coated 81 milliGRAM(s) Oral daily  atorvastatin 40 milliGRAM(s) Oral at bedtime  benzocaine/menthol Lozenge 1 Lozenge Oral once  bisacodyl 5 milliGRAM(s) Oral every 12 hours  buMETAnide IVPB 4 milliGRAM(s) IV Intermittent daily  chlorhexidine 4% Liquid 1 Application(s) Topical <User Schedule>  clopidogrel Tablet 75 milliGRAM(s) Oral daily  clopidogrel Tablet      dextrose 5%. 1000 milliLiter(s) (100 mL/Hr) IV Continuous <Continuous>  dextrose 5%. 1000 milliLiter(s) (50 mL/Hr) IV Continuous <Continuous>  dextrose 50% Injectable 25 Gram(s) IV Push once  dextrose 50% Injectable 12.5 Gram(s) IV Push once  dextrose 50% Injectable 25 Gram(s) IV Push once  glucagon  Injectable 1 milliGRAM(s) IntraMuscular once  heparin   Injectable 5000 Unit(s) SubCutaneous every 8 hours  hydrALAZINE 10 milliGRAM(s) Oral three times a day  insulin glargine Injectable (LANTUS) 11 Unit(s) SubCutaneous at bedtime  insulin lispro (ADMELOG) corrective regimen sliding scale   SubCutaneous three times a day before meals  insulin lispro (ADMELOG) corrective regimen sliding scale   SubCutaneous at bedtime  insulin lispro Injectable (ADMELOG) 5 Unit(s) SubCutaneous three times a day with meals  isosorbide   dinitrate Tablet (ISORDIL) 5 milliGRAM(s) Oral three times a day  linagliptin 5 milliGRAM(s) Oral daily  midodrine. 10 milliGRAM(s) Oral once  milrinone Infusion 0.25 MICROgram(s)/kG/Min (5.41 mL/Hr) IV Continuous <Continuous>  pantoprazole  Injectable 40 milliGRAM(s) IV Push daily  polyethylene glycol 3350 17 Gram(s) Oral daily  potassium phosphate / sodium phosphate Powder (PHOS-NaK) 1 Packet(s) Oral three times a day before meals  senna 2 Tablet(s) Oral at bedtime      PHYSICAL EXAM:  T(C): 36.5 (03-03-25 @ 10:54), Max: 36.9 (03-02-25 @ 20:50)  HR: 109 (03-03-25 @ 11:59) (102 - 117)  BP: 99/62 (03-03-25 @ 11:59) (90/51 - 99/62)  RR: 17 (03-03-25 @ 10:54) (17 - 18)  SpO2: 97% (03-03-25 @ 10:54) (97% - 97%)  Wt(kg): --  I&O's Summary    02 Mar 2025 07:01  -  03 Mar 2025 07:00  --------------------------------------------------------  IN: 629.9 mL / OUT: 350 mL / NET: 279.9 mL    Appearance: Awake, lying down in bed 	  HEENT:  Eyes are open   Lymphatic: No lymphadenopathy grossly   Cardiovascular: Normal    Respiratory: normal effort , clear  Gastrointestinal:  Soft, Non-tender  Skin: No rashes,  warm to touch  Psychiatry:  Mood & affect appropriate  Musculoskeletal: B/L LE Foot wounds; Wrapped in dressing; Edema improving           03-02-25 @ 07:01  -  03-03-25 @ 07:00  --------------------------------------------------------  IN: 629.9 mL / OUT: 350 mL / NET: 279.9 mL                            10.2   11.71 )-----------( 273      ( 03 Mar 2025 06:25 )             29.5               03-03    129[L]  |  90[L]  |  26[H]  ----------------------------<  188[H]  3.9   |  24  |  2.55[H]    Ca    9.0      03 Mar 2025 06:25  Phos  2.0     03-03  Mg     1.8     03-03                         Urinalysis Basic - ( 03 Mar 2025 06:25 )    Color: x / Appearance: x / SG: x / pH: x  Gluc: 188 mg/dL / Ketone: x  / Bili: x / Urobili: x   Blood: x / Protein: x / Nitrite: x   Leuk Esterase: x / RBC: x / WBC x   Sq Epi: x / Non Sq Epi: x / Bacteria: x

## 2025-03-03 NOTE — PROGRESS NOTE ADULT - PROBLEM SELECTOR PLAN 1
Inpatient Plan:  - Check BG TID AC and HS while on PO diet  - fasting BG above goal, slightly increase Lantus  to 12 u QHS  - C/w Admelog 5u TID AC (HOLD if NPO)  - C/w oral Tradjenta 5mg once daily   - C/w low dose Admelog correctional scales TID AC and HS    Discharge Planning:   - Likely basal/bolus regimen given kidney function (doses TBD closer to d/c). Not a candidate for SGLT2 due to leg ulcers and also significantly decreased EGFR., can consider GLP1 in addition to Basal/bolus> will need close f/u with Optho due to h/o retinopathy.   Can send Rx for ozempic 0.25mg weekly x 4weeks and increase to 0.50mg, or mounjaro 2.5mg 2.5mg x4 weeks, then increase to 5.0mg weekly. (Patient denies medullary thyroid cancer, hx of pancreatitis or MEN2)  - Please make sure patient has DM management supplies (glucometer, lancets, strips, alcohol pads, insulin pen needles).  - Patient should check BGs before meals and at bedtime. Contact PCP/endocrinology if BG is less than 70 X1, greater than  400 X1 or persistently greater than 200s.   - Endocrine follow up: can f/u with Dr. Grace, Endocrinology.   - Needs Optho/ renal/cardiac /podiatry and vascular follow up

## 2025-03-03 NOTE — PROGRESS NOTE ADULT - PROBLEM SELECTOR PLAN 1
-continue milrinone 0.25 for now  - continue to hold spironolactone  - minimal urine output with bumex IV, HD per nephro for now, watch for renal recovery  -check perfusion markers (LFTs, lactate) daily  -give IV iron sucrose x5 days  - not a candidate for advanced therapies given advanced CKD and PAD -continue milrinone 0.25 for now; will follow up after cath regarding plan for inotrope   -continue to hold spironolactone  -Continue afterload reduction with hydralazine 25mg TID and Isordil 5mg TID  -minimal urine output with bumex IV, HD per nephro for now, watch for renal recovery  -check perfusion markers (LFTs, lactate) daily  -not a candidate for advanced therapies given advanced CKD and PAD

## 2025-03-04 LAB
ANION GAP SERPL CALC-SCNC: 12 MMOL/L — SIGNIFICANT CHANGE UP (ref 5–17)
BUN SERPL-MCNC: 30 MG/DL — HIGH (ref 7–23)
CALCIUM SERPL-MCNC: 8.5 MG/DL — SIGNIFICANT CHANGE UP (ref 8.4–10.5)
CHLORIDE SERPL-SCNC: 93 MMOL/L — LOW (ref 96–108)
CO2 SERPL-SCNC: 25 MMOL/L — SIGNIFICANT CHANGE UP (ref 22–31)
CREAT SERPL-MCNC: 2.36 MG/DL — HIGH (ref 0.5–1.3)
EGFR: 23 ML/MIN/1.73M2 — LOW
EGFR: 23 ML/MIN/1.73M2 — LOW
GLUCOSE BLDC GLUCOMTR-MCNC: 113 MG/DL — HIGH (ref 70–99)
GLUCOSE BLDC GLUCOMTR-MCNC: 136 MG/DL — HIGH (ref 70–99)
GLUCOSE BLDC GLUCOMTR-MCNC: 146 MG/DL — HIGH (ref 70–99)
GLUCOSE BLDC GLUCOMTR-MCNC: 187 MG/DL — HIGH (ref 70–99)
GLUCOSE SERPL-MCNC: 106 MG/DL — HIGH (ref 70–99)
HCT VFR BLD CALC: 27.6 % — LOW (ref 34.5–45)
HGB BLD-MCNC: 9.4 G/DL — LOW (ref 11.5–15.5)
MAGNESIUM SERPL-MCNC: 1.8 MG/DL — SIGNIFICANT CHANGE UP (ref 1.6–2.6)
MCHC RBC-ENTMCNC: 24.6 PG — LOW (ref 27–34)
MCHC RBC-ENTMCNC: 34.1 G/DL — SIGNIFICANT CHANGE UP (ref 32–36)
MCV RBC AUTO: 72.3 FL — LOW (ref 80–100)
NRBC BLD AUTO-RTO: 0 /100 WBCS — SIGNIFICANT CHANGE UP (ref 0–0)
PHOSPHATE SERPL-MCNC: 2.4 MG/DL — LOW (ref 2.5–4.5)
PLATELET # BLD AUTO: 219 K/UL — SIGNIFICANT CHANGE UP (ref 150–400)
POTASSIUM SERPL-MCNC: 3.7 MMOL/L — SIGNIFICANT CHANGE UP (ref 3.5–5.3)
POTASSIUM SERPL-SCNC: 3.7 MMOL/L — SIGNIFICANT CHANGE UP (ref 3.5–5.3)
RBC # BLD: 3.82 M/UL — SIGNIFICANT CHANGE UP (ref 3.8–5.2)
RBC # FLD: 21.6 % — HIGH (ref 10.3–14.5)
SODIUM SERPL-SCNC: 130 MMOL/L — LOW (ref 135–145)
WBC # BLD: 10.35 K/UL — SIGNIFICANT CHANGE UP (ref 3.8–10.5)
WBC # FLD AUTO: 10.35 K/UL — SIGNIFICANT CHANGE UP (ref 3.8–10.5)

## 2025-03-04 PROCEDURE — 33990 INSJ PERQ VAD L HRT ARTERIAL: CPT

## 2025-03-04 PROCEDURE — 93010 ELECTROCARDIOGRAM REPORT: CPT | Mod: 77

## 2025-03-04 PROCEDURE — 99233 SBSQ HOSP IP/OBS HIGH 50: CPT

## 2025-03-04 PROCEDURE — 92933 PRQ TRLML C ATHRC ST ANGIOP1: CPT | Mod: LD

## 2025-03-04 PROCEDURE — 99152 MOD SED SAME PHYS/QHP 5/>YRS: CPT

## 2025-03-04 PROCEDURE — 92924 PRQ TRLUML C ATHRC 1 ART&/BR: CPT | Mod: LC

## 2025-03-04 PROCEDURE — 92928 PRQ TCAT PLMT NTRAC ST 1 LES: CPT | Mod: LM

## 2025-03-04 PROCEDURE — 93010 ELECTROCARDIOGRAM REPORT: CPT

## 2025-03-04 RX ORDER — B1/B2/B3/B5/B6/B12/VIT C/FOLIC 500-0.5 MG
1 TABLET ORAL DAILY
Refills: 0 | Status: DISCONTINUED | OUTPATIENT
Start: 2025-03-04 | End: 2025-03-17

## 2025-03-04 RX ORDER — SOD PHOS DI, MONO/K PHOS MONO 250 MG
1 TABLET ORAL ONCE
Refills: 0 | Status: COMPLETED | OUTPATIENT
Start: 2025-03-04 | End: 2025-03-04

## 2025-03-04 RX ORDER — HYDROMORPHONE/SOD CHLOR,ISO/PF 2 MG/10 ML
0.5 SYRINGE (ML) INJECTION ONCE
Refills: 0 | Status: DISCONTINUED | OUTPATIENT
Start: 2025-03-04 | End: 2025-03-04

## 2025-03-04 RX ADMIN — Medication 0.5 MILLIGRAM(S): at 21:57

## 2025-03-04 RX ADMIN — Medication 1 PACKET(S): at 11:44

## 2025-03-04 RX ADMIN — Medication 0.5 MILLIGRAM(S): at 22:57

## 2025-03-04 RX ADMIN — HEPARIN SODIUM 5000 UNIT(S): 1000 INJECTION INTRAVENOUS; SUBCUTANEOUS at 04:46

## 2025-03-04 RX ADMIN — INSULIN LISPRO 5 UNIT(S): 100 INJECTION, SOLUTION INTRAVENOUS; SUBCUTANEOUS at 17:45

## 2025-03-04 RX ADMIN — Medication 40 MILLIGRAM(S): at 11:51

## 2025-03-04 RX ADMIN — ATORVASTATIN CALCIUM 40 MILLIGRAM(S): 80 TABLET, FILM COATED ORAL at 21:45

## 2025-03-04 RX ADMIN — CLOPIDOGREL BISULFATE 75 MILLIGRAM(S): 75 TABLET, FILM COATED ORAL at 08:07

## 2025-03-04 RX ADMIN — Medication 1 MILLIGRAM(S): at 04:44

## 2025-03-04 RX ADMIN — Medication 1 APPLICATION(S): at 16:53

## 2025-03-04 RX ADMIN — Medication 1 MILLIGRAM(S): at 20:51

## 2025-03-04 RX ADMIN — Medication 81 MILLIGRAM(S): at 08:07

## 2025-03-04 RX ADMIN — Medication 1 MILLIGRAM(S): at 05:44

## 2025-03-04 RX ADMIN — HEPARIN SODIUM 5000 UNIT(S): 1000 INJECTION INTRAVENOUS; SUBCUTANEOUS at 21:45

## 2025-03-04 RX ADMIN — LINAGLIPTIN 5 MILLIGRAM(S): 5 TABLET, FILM COATED ORAL at 13:23

## 2025-03-04 RX ADMIN — BUMETANIDE 132 MILLIGRAM(S): 1 TABLET ORAL at 05:09

## 2025-03-04 RX ADMIN — Medication 1 MILLIGRAM(S): at 19:51

## 2025-03-04 RX ADMIN — INSULIN LISPRO 1: 100 INJECTION, SOLUTION INTRAVENOUS; SUBCUTANEOUS at 17:45

## 2025-03-04 RX ADMIN — INSULIN GLARGINE-YFGN 12 UNIT(S): 100 INJECTION, SOLUTION SUBCUTANEOUS at 21:44

## 2025-03-04 RX ADMIN — Medication 650 MILLIGRAM(S): at 13:25

## 2025-03-04 NOTE — PROGRESS NOTE ADULT - ASSESSMENT
64F, hx of HFrEF (previously 30-35%, now 20%), never had LHC, DM, diabetic foot ulcer, severe PAD b/l, presenting initially for vascular angiogram, found to be in ADHF with volume overload. Cardiac output is preserved on RHC, but elevated filling pressures. After diuresis, now with rising creatinine. Clinically appears to be euvolemic although conflicting with significantly elevated filling pressures on prior RHC.         Cardiac testin25 RHC: RA 20, PA 49/29 (40), PCWP 35, mVO2 51.1, CO/CI 3.8/2.2, SVR 1095  25 LHC: RCA , severe ostial LM disease, diffuse disease in LAD and LCx, some L to R collaterals  cMRI 25 : LVEF is 25%, greater than 75% subendocardial scarring involving the basal to mid inferior wall of the left ventricle, left ventricular transmural scarring involving the apical septal wall and likely near transmural scarring of the apical lateral wall. 50% scarring of the left ventricular basal inferoseptal wall.  TTE 2/10/25: EF 20%, reduced RVSF  TTE 25: EF 30-35%, grade 2 DD  NST 25: small moderate defect in apical wall that is predominantly fixed    Home cardiac meds: toprol 25

## 2025-03-04 NOTE — PROGRESS NOTE ADULT - SUBJECTIVE AND OBJECTIVE BOX
[facility] NEPHROLOGY- PROGRESS NOTE    64y Female with history of CHF presents as a transfer for LE CO2 angiogram. Nephrology consulted for elevated Scr.      REVIEW OF SYSTEMS:  Gen: no fevers  Cards: no chest pain  Resp: no dyspnea  GI: no nausea or vomiting or diarrhea  Vascular: + LE edema improving    Allergies: Augmentin (Stomach Upset; Vomiting; Nausea)    Hospital Medications: Medications reviewed      VITALS:  T(F): 97.9 (03-04-25 @ 10:50), Max: 98.5 (03-04-25 @ 04:49)  HR: 100 (03-04-25 @ 11:45)  BP: 94/53 (03-04-25 @ 11:45)  RR: 18 (03-04-25 @ 11:45)  SpO2: 95% (03-04-25 @ 11:45)  Wt(kg): --    03-03 @ 07:01  -  03-04 @ 07:00  --------------------------------------------------------  IN: 240 mL / OUT: 2100 mL / NET: -1860 mL          PHYSICAL EXAM:    Gen: NAD, calm  Cards: RRR, +S1/S2, no M/G/R  Resp: CTA B/L  GI: soft, NT/ND, NABS  : + stiles with cloudy urine  Vascular: + LE wrapped B/L with RLE edema > LLE edema, + RIJ subclavian. Tenderness upon palpation of lower extremities      LABS:  03-04    Na+ trend: 130 <--, 129 <--, 133 <--, 128 <--, 135 <--, 130 <--, 131 <--    130[L]  |  93[L]  |  30[H]  ----------------------------<  106[H]  3.7   |  25  |  2.36[H]    Ca    8.5      04 Mar 2025 05:49  Phos  2.4     03-04  Mg     1.8     03-04      Creatinine Trend: 2.36 <--, 2.55 <--, 2.23 <--, 3.65 <--, 3.12 <--, 3.39 <--, 2.65 <--                        9.4    10.35 )-----------( 219      ( 04 Mar 2025 05:51 )             27.6     Urine Studies:  Urinalysis Basic - ( 04 Mar 2025 05:49 )    Color:  / Appearance:  / SG:  / pH:   Gluc: 106 mg/dL / Ketone:   / Bili:  / Urobili:    Blood:  / Protein:  / Nitrite:    Leuk Esterase:  / RBC:  / WBC    Sq Epi:  / Non Sq Epi:  / Bacteria:

## 2025-03-04 NOTE — PROGRESS NOTE ADULT - PROBLEM SELECTOR PROBLEM 3
Detail Level: Detailed Quality 226: Preventive Care And Screening: Tobacco Use: Screening And Cessation Intervention: Patient screened for tobacco use and is an ex/non-smoker DM (diabetes mellitus) Breast pain, right

## 2025-03-04 NOTE — PROGRESS NOTE ADULT - ASSESSMENT
64F, hx of CHF (last TTE on 1/16/25 EF 30-35%, G2DD), DM, diabetic foot ulcer with a recent admission at [facility] for CHF exacerbation 1/14-1/17 p/w worsening R. leg pain and fluid secretion, was meeting sepsis criteria with podiatry having low suspicion for right cellulitis and admitted for continued management of HF exacerbation and needing ischemic eval.     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  -Left leg Arterial Duplex: Slightly tardus parvus waveform of the left popliteal artery is noted, for which underlying disease cannot be excluded. Posterior tibial artery waveform is nonpulsatile. Anterior tibial artery tardus parvus flow is noted.   Transferred to [facility] for CO2 angiogram as per vascular surgery    #  PAD Wound of lower extremity.   ·  Plan: Podiatry following, b/l serous bullae, no concerns for infection  Found to have RLE coolness  -Right Leg Arterial Duplex: Popliteal artery is occluded with negligible flow of right trifurcation arteries.  -Left leg Arterial Duplex: Slightly tardus parvus waveform of the left popliteal artery is noted, for which underlying disease cannot be excluded. Posterior tibial artery waveform is nonpulsatile. Anterior tibial artery tardus parvus flow is noted.  -Started on heparin gtt and dobutamine gtt -Will transfer to [facility] for CO2 angiogram as per vascular surgery as not candidate for CTA due to worsening SCr  -Keep compression dressing  -LE elevation above heart level at rest.  -Transferred to [facility] for CO2 angiogram   - Overall this patient is at   intermediate  risk (for cardiac death, nonfatal myocardial infarction, and nonfatal cardiac arrest perioperatively for this intermediate  risk procedure).   No cardiac contraindications for CO2 vangiogram  There  are  no further recommendation for risk stratifying imaging/stress testing prior to planned surgery  Seen by Podiatry-No acute pod intervention, local wound care only-         PAD with rest ischemia  s/p AT/Popliteal angioplasty on DAPT        # HFrEF (congestive heart failure). Ischemic Cardiomyopathy  ·  Plan: Hx of HF, previous admission in January, on home Lasix 40 qD, Metoprolol Succ 25 qD. Not on Entresto due to insurance issues  Last TTE: LVSF moderately decreased w/ EF 30-35 %. Moderate G2DD. Mild MR  Stress test: small-sized, moderate defect(s) in the apical wall that is predominantly fixed suggestive of an infarction with minimal marcus-infarct ischemia  Ischemic cardiomyopathy  GDMT on hold 2/2 JAMEL    Repeat TTE EF 20% with WMA RAP~~8  Likely Ischemic cardiomyopathy despite NST revealing fixed defectLHC confirming  Multivessel CAD  Remains on Milrinone add Hydrall/isosorbide      RHC: RA 20, PA 49/29 (40), PCWP 35, mVO2 51.1, CO/CI 3.8/2.2, SVR 1095    # CAD  [facility]-RCA , severe ostial LM disease, diffuse disease in LAD and LCx, some L to R collaterals-seen by CTS advise cMR for viability poor target vessels for CABG-?High risk LM/LAD PCI  Had cMR-LVEF is 25%, greater than 75% subendocardial scarring involving the basal to mid inferior wall of the left ventricle, left ventricular transmural scarring involving the apical septal wall and likely near transmural scarring of the apical lateral wall. 50% scarring of the left ventricular basal inferoseptal wall.  Will need High risk PCI vs CABG though less likely 2/2 poor target vessel    PLAN FOR High risk PCI today Dr Fair    Started on Milrinone to assist augmented diuresis in attempt to avoid further renal failure  Has lost Weight 170---> 164 Kg---> 161.1 Kg---> 165 Kg-->151.2---> 155 ---> 158 --> 148  --> 147  Kg      For HD catheter placement to initiate Dialysis  02/21-Started HD-500mL  02/22-HD 1000mL  02/22-For LE CO2 angiogram-No cardiac contraindications to proceeding with procedure  02/24-CO2 angiogram  02/25-s/p RLE angiogram with pop and AT angioplasty on DAPT  02/26-On Milrinone and Bumex tolerating Hyd/Isos Plan for possible High risk PCI-oLM/LAD  02/27 Had HD yesterday for High risk PCI tomorrow  03/01-LVEDP~~25 on [facility] plan for dialisis over weekend to optimize fluid balance for High risk PCI on Monday 03/02-For PCI tomorrow  03/04-Had HD 1.5L removed PCI today        03/03-For High risk PCI oLM/LAD today    LE EDEMA no DVT      # JAMEL  Creatinine Trend:2.55 <--2.23<-- 3.01 <--3.39<--2.65<--2.34<--2.12<--, 1.89<- 3.02<--3.38<--(HD)-- 4.76<--4.07<---3.90<-- 1.17  Has had 3 sessions HD for interrmittent HD to allow contrast based procedures is non oliguric  Had HD 3/2 1800mL removed

## 2025-03-04 NOTE — PROGRESS NOTE ADULT - SUBJECTIVE AND OBJECTIVE BOX
Name of Patient : TOMASZ ALBA  MRN: [id number]  Date of visit: 03-04-25       Subjective: Patient seen and examined. No new events except as noted.   Doing okay   cath today     REVIEW OF SYSTEMS:    CONSTITUTIONAL: No weakness, fevers or chills  EYES/ENT: No visual changes;  No vertigo or throat pain   NECK: No pain or stiffness  RESPIRATORY: No cough, wheezing, hemoptysis; No shortness of breath  CARDIOVASCULAR: No chest pain or palpitations  GASTROINTESTINAL: No abdominal or epigastric pain. No nausea, vomiting, or hematemesis; No diarrhea or constipation. No melena or hematochezia.  GENITOURINARY: No dysuria, frequency or hematuria  NEUROLOGICAL: No numbness or weakness  SKIN: No itching, burning, rashes, or lesions   All other review of systems is negative unless indicated above.    MEDICATIONS:  MEDICATIONS  (STANDING):  aspirin enteric coated 81 milliGRAM(s) Oral daily  atorvastatin 40 milliGRAM(s) Oral at bedtime  benzocaine/menthol Lozenge 1 Lozenge Oral once  bisacodyl 5 milliGRAM(s) Oral every 12 hours  buMETAnide IVPB 4 milliGRAM(s) IV Intermittent daily  chlorhexidine 4% Liquid 1 Application(s) Topical <User Schedule>  clopidogrel Tablet 75 milliGRAM(s) Oral daily  clopidogrel Tablet      dextrose 5%. 1000 milliLiter(s) (50 mL/Hr) IV Continuous <Continuous>  dextrose 5%. 1000 milliLiter(s) (100 mL/Hr) IV Continuous <Continuous>  dextrose 50% Injectable 25 Gram(s) IV Push once  dextrose 50% Injectable 12.5 Gram(s) IV Push once  dextrose 50% Injectable 25 Gram(s) IV Push once  glucagon  Injectable 1 milliGRAM(s) IntraMuscular once  heparin   Injectable 5000 Unit(s) SubCutaneous every 8 hours  insulin glargine Injectable (LANTUS) 12 Unit(s) SubCutaneous at bedtime  insulin lispro (ADMELOG) corrective regimen sliding scale   SubCutaneous three times a day before meals  insulin lispro (ADMELOG) corrective regimen sliding scale   SubCutaneous at bedtime  insulin lispro Injectable (ADMELOG) 5 Unit(s) SubCutaneous three times a day with meals  isosorbide   dinitrate Tablet (ISORDIL) 5 milliGRAM(s) Oral three times a day  linagliptin 5 milliGRAM(s) Oral daily  milrinone Infusion 0.25 MICROgram(s)/kG/Min (5.41 mL/Hr) IV Continuous <Continuous>  Nephro-rober 1 Tablet(s) Oral daily  pantoprazole  Injectable 40 milliGRAM(s) IV Push daily  polyethylene glycol 3350 17 Gram(s) Oral daily  senna 2 Tablet(s) Oral at bedtime      PHYSICAL EXAM:  T(C): 36.9 (03-04-25 @ 19:51), Max: 36.9 (03-04-25 @ 04:49)  HR: 105 (03-04-25 @ 19:51) (99 - 105)  BP: 107/75 (03-04-25 @ 19:51) (86/47 - 108/56)  RR: 18 (03-04-25 @ 16:12) (17 - 18)  SpO2: 95% (03-04-25 @ 16:12) (92% - 99%)  Wt(kg): --  I&O's Summary    03 Mar 2025 07:01  -  04 Mar 2025 07:00  --------------------------------------------------------  IN: 240 mL / OUT: 2100 mL / NET: -1860 mL    04 Mar 2025 07:01  -  04 Mar 2025 22:40  --------------------------------------------------------  IN: 0 mL / OUT: 800 mL / NET: -800 mL          Appearance: Normal	  HEENT:  PERRLA   Lymphatic: No lymphadenopathy   Cardiovascular: Normal S1 S2, no JVD  Respiratory: normal effort , clear  Gastrointestinal:  Soft, Non-tender  Skin: No rashes,  warm to touch  Psychiatry:  Mood & affect appropriate  Musculuskeletal: No edema    recent labs, Imaging and EKGs personally reviewed     03-03-25 @ 07:01  -  03-04-25 @ 07:00  --------------------------------------------------------  IN: 240 mL / OUT: 2100 mL / NET: -1860 mL    03-04-25 @ 07:01  -  03-04-25 @ 22:40  --------------------------------------------------------  IN: 0 mL / OUT: 800 mL / NET: -800 mL                          9.4    10.35 )-----------( 219      ( 04 Mar 2025 05:51 )             27.6               03-04    130[L]  |  93[L]  |  30[H]  ----------------------------<  106[H]  3.7   |  25  |  2.36[H]    Ca    8.5      04 Mar 2025 05:49  Phos  2.4     03-04  Mg     1.8     03-04                         Urinalysis Basic - ( 04 Mar 2025 05:49 )    Color: x / Appearance: x / SG: x / pH: x  Gluc: 106 mg/dL / Ketone: x  / Bili: x / Urobili: x   Blood: x / Protein: x / Nitrite: x   Leuk Esterase: x / RBC: x / WBC x   Sq Epi: x / Non Sq Epi: x / Bacteria: x

## 2025-03-04 NOTE — PROGRESS NOTE ADULT - SUBJECTIVE AND OBJECTIVE BOX
MR#[id number]  PATIENT NAME:COLE ALBA    DATE OF SERVICE: 25 @ 05:58  Patient was seen and examined by Yordy Gilmore MD on    25 @ 05:58 .  Interim events noted.Consultant notes ,Labs,Telemetry reviewed by me       HOSPITAL COURSE: HPI:  64F, hx of CHF (last TTE on 25 EF 30-35%, G2DD), DM, diabetic foot ulcer with a recent admission at [facility] for CHF exacerbation presented as a transfer for worsening R. leg pain and fluid secretion, On non-invasive imaging demonstrating severe depletion of RLE blood flow beyond the popliteal vessel at knee and similar findings in L. Was transferred to [facility] for CO2 angiogram with Dr. Baltazar. (2025 20:05)      INTERIM EVENTS:Patient seen at bedside ,interim events noted.  -Had cath Multivessel CAD started on Milrinone for CTS evaluation albeit targets not optimal  02/15-Awake on Milrinone and Bumex gtt-seen by CTS not a surgical candidate-needs Vascular work-up for LE PAD-scheduled for Angiogram on Wednesday    Weight 170Kg---> 164 Kg--->161.1 Kg---151.2 ---> 155 --> 154.7 ---> 158 --> 148--->147  Kg    -s/p RLE angiogram with pop and AT angioplasty   -Awake had iHD plan for High risk PCI oLM/LAD tomorrow 1000mL removed  Had ? pAF ~~3 secs  -Awake no dyspnea chest pain plan for PCI-oLM/LAD todat HD after PCI-Sinus rhythm  -Had [facility] PCWP-15 still elevated with low BP Plan to dialyse over weekend and plan for PCI on Monday-No dyspnea  -Awake had HD removed 1500Ml no dyspnea Plan for PCI tomorrow  -Awake Sinus rhythm no dyspnea FOR High risk PCI oLM/LAD today  -Awake had HD last night-1500mL removed For High risk PCI today        PMH -reviewed admission note, no change since admission  HEART FAILURE: Acute[x ]Chronic[ ] Systolic[x ] Diastolic[ ] Combined Systolic and Diastolic[ ]  CAD[x ] CABG[ ] PCI[ ]  DEVICES[ ] PPM[ ] ICD[ ] ILR[ ]  ATRIAL FIBRILLATION[ ] Paroxysmal[ ] Permanent[ ] CHADS2-[  ]  JAMEL[x ] CKD1[ ] CKD2[ ] CKD3[ ] CKD4[x ] ESRD[ ]  COPD[ ] HTN[x ]   DM[x ] Type1[ ] Type 2[x ]   CVA[ ] Paresis[ ]    AMBULATION: Assisted[x ] Cane/walker[ ] Independent[ ]      MEDICATIONS  (STANDING):  aspirin enteric coated 81 milliGRAM(s) Oral daily  atorvastatin 40 milliGRAM(s) Oral at bedtime  benzocaine/menthol Lozenge 1 Lozenge Oral once  bisacodyl 5 milliGRAM(s) Oral every 12 hours  buMETAnide IVPB 4 milliGRAM(s) IV Intermittent daily  chlorhexidine 4% Liquid 1 Application(s) Topical <User Schedule>  clopidogrel Tablet 75 milliGRAM(s) Oral daily  clopidogrel Tablet      glucagon  Injectable 1 milliGRAM(s) IntraMuscular once  heparin   Injectable 5000 Unit(s) SubCutaneous every 8 hours  insulin glargine Injectable (LANTUS) 12 Unit(s) SubCutaneous at bedtime  insulin lispro (ADMELOG) corrective regimen sliding scale   SubCutaneous three times a day before meals  insulin lispro (ADMELOG) corrective regimen sliding scale   SubCutaneous at bedtime  insulin lispro Injectable (ADMELOG) 5 Unit(s) SubCutaneous three times a day with meals  isosorbide   dinitrate Tablet (ISORDIL) 5 milliGRAM(s) Oral three times a day  linagliptin 5 milliGRAM(s) Oral daily  milrinone Infusion 0.25 MICROgram(s)/kG/Min (5.41 mL/Hr) IV Continuous <Continuous>  pantoprazole  Injectable 40 milliGRAM(s) IV Push daily  polyethylene glycol 3350 17 Gram(s) Oral daily  potassium phosphate / sodium phosphate Powder (PHOS-NaK) 1 Packet(s) Oral three times a day before meals  senna 2 Tablet(s) Oral at bedtime    MEDICATIONS  (PRN):  acetaminophen     Tablet .. 650 milliGRAM(s) Oral every 6 hours PRN Mild Pain (1 - 3)  dextrose Oral Gel 15 Gram(s) Oral once PRN Blood Glucose LESS THAN 70 milliGRAM(s)/deciliter  HYDROmorphone  Injectable 1 milliGRAM(s) IV Push every 6 hours PRN Severe Pain (7 - 10)  melatonin 3 milliGRAM(s) Oral at bedtime PRN Insomnia  ondansetron Injectable 4 milliGRAM(s) IV Push every 6 hours PRN Nausea and/or Vomiting  sodium chloride 0.65% Nasal 1 Spray(s) Both Nostrils three times a day PRN Dryness  sodium chloride 0.9% lock flush 10 milliLiter(s) IV Push every 1 hour PRN Pre/post blood products, medications, blood draw, and to maintain line patency            REVIEW OF SYSTEMS:  Constitutional: [ ] fever, [ ]weight loss,  [ ]fatigue [ ]weight gain  Eyes: [ ] visual changes  Respiratory: [ ]shortness of breath;  [ ] cough, [ ]wheezing, [ ]chills, [ ]hemoptysis  Cardiovascular: [ ] chest pain, [ ]palpitations, [ ]dizziness,  [ ]leg swelling[ ]orthopnea[ ]PND  Gastrointestinal: [ ] abdominal pain, [ ]nausea, [ ]vomiting,  [ ]diarrhea [ ]Constipation [ ]Melena  Genitourinary: [ ] dysuria, [ ] hematuria [ ][place]  Neurologic: [ ] headaches [ ] tremors[ ]weakness [ ]Paralysis Right[ ] Left[ ]  Skin: [ ] itching, [ ]burning, [ ] rashes  Endocrine: [ ] heat or cold intolerance  Musculoskeletal: [ ] joint pain or swelling; [ ] muscle, back, or extremity pain  Psychiatric: [ ] depression, [ ]anxiety, [ ]mood swings, or [ ]difficulty sleeping  Hematologic: [ ] easy bruising, [ ] bleeding gums    [ ] All remaining systems negative except as per above.   [ ]Unable to obtain.  [x] No change in ROS since admission      Vital Signs Last 24 Hrs  T(C): 36.9 (04 Mar 2025 04:49), Max: 36.9 (03 Mar 2025 21:15)  T(F): 98.5 (04 Mar 2025 04:49), Max: 98.5 (04 Mar 2025 04:49)  HR: 101 (04 Mar 2025 04:49) (101 - 110)  BP: 107/75 (04 Mar 2025 04:49) (93/55 - 133/75)  BP(mean): --  RR: 18 (04 Mar 2025 04:49) (17 - 18)  SpO2: 92% (04 Mar 2025 04:49) (92% - 97%)    Parameters below as of 04 Mar 2025 04:49  Patient On (Oxygen Delivery Method): room air      I&O's Summary    02 Mar 2025 07:01  -  03 Mar 2025 07:00  --------------------------------------------------------  IN: 629.9 mL / OUT: 350 mL / NET: 279.9 mL    03 Mar 2025 07:01  -  04 Mar 2025 05:58  --------------------------------------------------------  IN: 240 mL / OUT: 1850 mL / NET: -1610 mL        PHYSICAL EXAM:  General: No acute distress BMI-27  HEENT: EOMI, PERRL  Neck: Supple, [ ] JVD  Lungs: Equal air entry bilaterally; [ ] rales [ ] wheezing [ ] rhonchi  Heart: Regular rate and rhythm; [x ] murmur   2/6 [ x] systolic [ ] diastolic [ ] radiation[ ] rubs [ ]  gallops  Abdomen: Nontender, bowel sounds present  Extremities: No clubbing, cyanosis, [ ] edema [x ]Warm, well perfused        RLE: Dressings in place, blistering and ulceration on the dorsal aspect of the foot        LLE: First digit dry gangrene on the plantar aspect  Nervous system:  Alert & Oriented X3, no focal deficits  Psychiatric: Normal affect  Skin: No rashes or lesions    LABS:      129[L]  |  90[L]  |  26[H]  ----------------------------<  188[H]  3.9   |  24  |  2.55[H]    Ca    9.0      03 Mar 2025 06:25  Phos  2.0       Mg     1.8           Creatinine Trend: 2.55<--, 2.23<--, 3.65<--, 3.12<--, 3.39<--, 2.65<--                        10.2   11.71 )-----------( 273      ( 03 Mar 2025 06:25 )             29.5           TTE Limited W or WO Ultrasound Enhancing Agent (25 @ 12:39) >  CONCLUSIONS:      1. Limited TTE to assess for MR, IVC, LVOT, TR.   2. Left ventricular systolic function is severely decreased.   3. Reduced right ventricular systolic function.   4. LVOT VTI 12.0cm, Stroke volume 35cc. LVOT diameter 1.9cm.   5. Mild to moderate tricuspid regurgitation.   6. Estimated pulmonary artery systolic pressure is 32 mmHg, consistent with normal pulmonary artery pressure.   7. The inferior vena cava is normal in size measuring 1.70 cm in diameter, (normal <2.1cm) with abnormal inspiratory collapse (abnormal <50%) consistent with mildly elevated right atrial pressure (~8, range 5-10mmHg).   8. Compared to the transthoracic echocardiogram performed on 2/10/2025, RV and LV function still .         Xray Chest 1 View AP/PA (25 @ 18:05) >    IMPRESSION:  No focal consolidations.  Mildpulmonary vascular congestion.        Cardiac Catheterization (25 @ 14:24) >  Diagnostic Conclusions:     Right dominant coronary anatomy with severe multivessel disease (high syntax score)  LM   Left main artery: There is a 70 % stenosis in the middle third portion of the segment.    LAD   Left anterior descending artery: Angiography shows severe atherosclerosis. There is a 90 % stenosis in the middle third portion of the segment.      CX   Circumflex: Angiography shows severe atherosclerosis. There is an 80 % stenosis in the ostium portion of the segment.    RCA   Right coronary artery: The distal vessel is supplied by limited collaterals from the Left anterior descending artery. There is a 100 % total occlusion stenosis in the proximal third portion of the segment.      Mild-moderate post capillary pulmonary hypertension, with reduced cardiac output/index  Elevated RA (22mmHg) and PCWP (35mmHg) pressures. Elevated V waves on  PCWP tracing (44mmHg)    MR Cardiac w/wo IV Cont (25 @ 09:44) >  IMPRESSION:.    The left ventricle demonstrates severe wall motion abnormalities and  function is depressed (calculated LVEF is 25%).    There is greater than 75% subendocardial scarring involving the basal to  mid inferior wall of the left ventricle.    There is left ventricular transmural scarring involving the apical septal  wall and likely near transmural scarring of the apical lateral wall.    There is about 50% scarring of the left ventricular basal inferoseptal  wall.

## 2025-03-04 NOTE — PROGRESS NOTE ADULT - SUBJECTIVE AND OBJECTIVE BOX
ADVANCED HEART FAILURE & TRANSPLANT  - PROGRESS NOTE  *To reach the NS2 Team from 8am to 5pm (MON-FRI), please call [phone].   _______________________________________________________________________________________________________    Subjective:  s/p PCI today  - Remains on Milrinone 0.25mcg/kg        Medications:  acetaminophen     Tablet .. 650 milliGRAM(s) Oral every 6 hours PRN  aspirin enteric coated 81 milliGRAM(s) Oral daily  atorvastatin 40 milliGRAM(s) Oral at bedtime  benzocaine/menthol Lozenge 1 Lozenge Oral once  bisacodyl 5 milliGRAM(s) Oral every 12 hours  buMETAnide IVPB 4 milliGRAM(s) IV Intermittent daily  chlorhexidine 4% Liquid 1 Application(s) Topical <User Schedule>  clopidogrel Tablet 75 milliGRAM(s) Oral daily  clopidogrel Tablet      dextrose 5%. 1000 milliLiter(s) IV Continuous <Continuous>  dextrose 5%. 1000 milliLiter(s) IV Continuous <Continuous>  dextrose 50% Injectable 25 Gram(s) IV Push once  dextrose 50% Injectable 12.5 Gram(s) IV Push once  dextrose 50% Injectable 25 Gram(s) IV Push once  dextrose Oral Gel 15 Gram(s) Oral once PRN  glucagon  Injectable 1 milliGRAM(s) IntraMuscular once  heparin   Injectable 5000 Unit(s) SubCutaneous every 8 hours  HYDROmorphone  Injectable 1 milliGRAM(s) IV Push every 6 hours PRN  insulin glargine Injectable (LANTUS) 12 Unit(s) SubCutaneous at bedtime  insulin lispro (ADMELOG) corrective regimen sliding scale   SubCutaneous three times a day before meals  insulin lispro (ADMELOG) corrective regimen sliding scale   SubCutaneous at bedtime  insulin lispro Injectable (ADMELOG) 5 Unit(s) SubCutaneous three times a day with meals  isosorbide   dinitrate Tablet (ISORDIL) 5 milliGRAM(s) Oral three times a day  linagliptin 5 milliGRAM(s) Oral daily  melatonin 3 milliGRAM(s) Oral at bedtime PRN  milrinone Infusion 0.25 MICROgram(s)/kG/Min IV Continuous <Continuous>  ondansetron Injectable 4 milliGRAM(s) IV Push every 6 hours PRN  pantoprazole  Injectable 40 milliGRAM(s) IV Push daily  polyethylene glycol 3350 17 Gram(s) Oral daily  senna 2 Tablet(s) Oral at bedtime  sodium chloride 0.65% Nasal 1 Spray(s) Both Nostrils three times a day PRN  sodium chloride 0.9% lock flush 10 milliLiter(s) IV Push every 1 hour PRN      Physical Exam:    Vitals:  Vital Signs Last 24 Hours  T(C): 36.6 (25 @ 10:50), Max: 36.9 (25 @ 21:15)  HR: 99 (25 @ 12:45) (99 - 108)  BP: 96/50 (25 @ 12:45) (94/53 - 133/75)  RR: 18 (25 @ 12:45) (17 - 18)  SpO2: 95% (25 @ 12:45) (92% - 97%)    Weight in k.8 ( @ 06:45)    I&O's Summary    03 Mar 2025 07:01  -  04 Mar 2025 07:00  --------------------------------------------------------  IN: 240 mL / OUT: 2100 mL / NET: -1860 mL    Tele: 's    General: No distress. Comfortable.  HEENT: EOM intact.  Neck: JVP ~8cm of H2O  Chest: Clear to auscultation bilaterally  CV: Normal S1 and S2. No murmurs, rub, or gallops. Radial pulses normal, warm periphe  Abdomen: Soft, non-distended, non-tender  Skin: No rashes or skin breakdown  Extremities: No LE edema  Neurology: Alert and oriented times three. Sensation intact  Psych: Affect normal    Labs:                        9.4    10.35 )-----------( 219      ( 04 Mar 2025 05:51 )             27.6     03-04    130[L]  |  93[L]  |  30[H]  ----------------------------<  106[H]  3.7   |  25  |  2.36[H]    Ca    8.5      04 Mar 2025 05:49  Phos  2.4     03-04  Mg     1.8     03-04                     ADVANCED HEART FAILURE & TRANSPLANT  - PROGRESS NOTE  *To reach the NS2 Team from 8am to 5pm (MON-FRI), please call [phone].   _______________________________________________________________________________________________________    Subjective:  s/p PCI today  - Remains on Milrinone 0.25mcg/kg        Medications:  acetaminophen     Tablet .. 650 milliGRAM(s) Oral every 6 hours PRN  aspirin enteric coated 81 milliGRAM(s) Oral daily  atorvastatin 40 milliGRAM(s) Oral at bedtime  benzocaine/menthol Lozenge 1 Lozenge Oral once  bisacodyl 5 milliGRAM(s) Oral every 12 hours  buMETAnide IVPB 4 milliGRAM(s) IV Intermittent daily  chlorhexidine 4% Liquid 1 Application(s) Topical <User Schedule>  clopidogrel Tablet 75 milliGRAM(s) Oral daily  clopidogrel Tablet      dextrose 5%. 1000 milliLiter(s) IV Continuous <Continuous>  dextrose 5%. 1000 milliLiter(s) IV Continuous <Continuous>  dextrose 50% Injectable 25 Gram(s) IV Push once  dextrose 50% Injectable 12.5 Gram(s) IV Push once  dextrose 50% Injectable 25 Gram(s) IV Push once  dextrose Oral Gel 15 Gram(s) Oral once PRN  glucagon  Injectable 1 milliGRAM(s) IntraMuscular once  heparin   Injectable 5000 Unit(s) SubCutaneous every 8 hours  HYDROmorphone  Injectable 1 milliGRAM(s) IV Push every 6 hours PRN  insulin glargine Injectable (LANTUS) 12 Unit(s) SubCutaneous at bedtime  insulin lispro (ADMELOG) corrective regimen sliding scale   SubCutaneous three times a day before meals  insulin lispro (ADMELOG) corrective regimen sliding scale   SubCutaneous at bedtime  insulin lispro Injectable (ADMELOG) 5 Unit(s) SubCutaneous three times a day with meals  isosorbide   dinitrate Tablet (ISORDIL) 5 milliGRAM(s) Oral three times a day  linagliptin 5 milliGRAM(s) Oral daily  melatonin 3 milliGRAM(s) Oral at bedtime PRN  milrinone Infusion 0.25 MICROgram(s)/kG/Min IV Continuous <Continuous>  ondansetron Injectable 4 milliGRAM(s) IV Push every 6 hours PRN  pantoprazole  Injectable 40 milliGRAM(s) IV Push daily  polyethylene glycol 3350 17 Gram(s) Oral daily  senna 2 Tablet(s) Oral at bedtime  sodium chloride 0.65% Nasal 1 Spray(s) Both Nostrils three times a day PRN  sodium chloride 0.9% lock flush 10 milliLiter(s) IV Push every 1 hour PRN      Physical Exam:    Vitals:  Vital Signs Last 24 Hours  T(C): 36.6 (25 @ 10:50), Max: 36.9 (25 @ 21:15)  HR: 99 (25 @ 12:45) (99 - 108)  BP: 96/50 (25 @ 12:45) (94/53 - 133/75)  RR: 18 (25 @ 12:45) (17 - 18)  SpO2: 95% (25 @ 12:45) (92% - 97%)    Weight in k.8 ( @ 06:45)    I&O's Summary    03 Mar 2025 07:01  -  04 Mar 2025 07:00  --------------------------------------------------------  IN: 240 mL / OUT: 2100 mL / NET: -1860 mL    Tele: 's    General: No distress. Comfortable.  HEENT: EOM intact.  Neck: JVP ~8cm of H2O  Chest: Clear to auscultation bilaterally  CV: Normal S1 and S2. No murmurs, rub, or gallops. Radial pulses normal, warm peripherally  Abdomen: Soft, non-distended, non-tender  Skin: No rashes or skin breakdown  Extremities: ACE wrap to calves  Neurology: Alert and oriented times three. Sensation intact  Psych: Affect normal    Labs:                        9.4    10.35 )-----------( 219      ( 04 Mar 2025 05:51 )             27.6     03-04    130[L]  |  93[L]  |  30[H]  ----------------------------<  106[H]  3.7   |  25  |  2.36[H]    Ca    8.5      04 Mar 2025 05:49  Phos  2.4     03-04  Mg     1.8     03-04

## 2025-03-04 NOTE — PROGRESS NOTE ADULT - PROBLEM SELECTOR PLAN 1
-continue milrinone 0.25 for now; will continue for now  -continue to hold spironolactone  -Continue afterload reduction with hydralazine 25mg TID and Isordil 5mg TID  -minimal urine output with Bumex IV, HD per nephro for now, watch for renal recovery  -check perfusion markers (LFTs, lactate) daily  -not a candidate for advanced therapies given advanced CKD and PAD

## 2025-03-04 NOTE — PROGRESS NOTE ADULT - ASSESSMENT
63 YO F with PMHx of HFrEF 30-35 and grade 2 ddfxn (last TTE on 01/16/25), DM2, and diabetic foot ulcer. Recent admission at [facility] for ADHF. Patient now represents to OSH and ultimately to [facility] as a transfer for worsening right leg pain and fluid secretion. As per documentation, patient was reported to have severe depletion of RLE blood flow beyond the popliteal vessel at knee and similar findings in the left. Patient was transferred to [facility] for CO2 angiogram with Dr. Baltazar. Of note, patient also with reported visual disturbance for which patient was seen and evaluated by opthalmology. Internal Medicine has been consulted on Ms. Kirby's care for medical management.     # ADHF/ BiV HFrEF 20   - Recent admission at [facility] for ADHF and on dobutamine outpatient  - TTE 1/16 with EF 30-35 with moderately reduced LVSF and grade 2 ddfxn, normal RVSF , trace TR/ PA, and trace pericardial effusion  - NST abnormal with small-sized, moderate defect in apical wall that is predominantly fixed suggestive of an infarction with minimal marcus-infarct ischemia. Post stress LVEF 25.  - CT Chest with small BL pleural effusions and small pericardial effusion.  - RPT TTE with EF 20, severely decreased LV, decreased RVSF with TAPSE 1.2, and regional wall motion abnormalities present with entire septum, entire apex, entire inferior wall, mid inferolateral segment, and mid anterolateral segment are hypokinetic.   - RHC with RA 20, PA 49/29 (40), PCWP 35, mVO2 51.1, CO/CI 3.8/2.2, SVR 1095 w/ Multivessel CAD   - s/p zaroxyln 10 QD  - CRE and sodium rising and bumex GTT stopped 2/18 and HTS stopped 2/16   - RPT limited TTE w/ LVSF severely decreased, reduced RVSF, LVOT VTI 12, mild to mod TR, and mildly elevated right atrial pressure  - Continues on milrinone gtt as per Cardio   - Continue on hydral 25 and isordil 5  - HF and cards following and adjusting medications   - Case discussed with EP and needs to be on GDMT for 3 months before AICD placement and should be stabilized off of inotropic support.   - Monitor I and O, diuresis per Cardio/ Renal    - GDMT as per Cardio -> on Hydralazine, Isosorbide   - Monitor volume status   - Check daily weights    - Monitor on telemetry; Monitor electrolytes   - Cardio, HF, Renal, and EP evals appreciated; F/u recs   - Planned for high risk PCI    # CAD with TVD   - [facility] with RCA , severe ostial LM disease, diffuse disease in LAD and LCx, some L to R collaterals (Multivessel CAD)  - Case discussed with CTSx and NOT a candidate for CABG due to comorbidities  - Cardiac MRI with greater than 75% subendocardial scarring of the basal to mid-inferior wall of the LV and 50% scarring of the left ventricular basal inferoseptal wall. There is also left ventricular transmural scarring involving the apical septal wall and likely near transmural scarring of the apical lateral wall.  - Plan for RPT [facility] with high risk PCI  - AS and Statin   - Cardiology following     # BL LE Edema likely in setting of ADHF  - Diuresis as per Cardio, HF and Renal   - BL LE elevation and compression  - LE Duplex neg   - Monitor for now  - Podiatry and Vascular evals appreciated; F/u recs -->S/P angio w/ angioplasty with vascular, on DAPT     # Hyponatremia  - F/u labs post HD. Likely 2/2 volume overload    - Monitor Na level; void overcorrection > 6-8 mEq in 24 hours  - Renal eval appreciated; F/u recs    # Pericardial effusion likely in setting ADHF  - TTE with trace pericardial effusion   - CT Chest with small pericardial effusion   - Denies chest pain, palpitations, chest tightness or discomfort, shortness of breath or dyspnea   - Repeat TTE in 1 month for further evaluation   - Diuresis per cardio/ renal.  - Monitor on telemetry  - Cardio following    # Pleural effusion likely in setting ADHF  - CT Chest with small BL pleural effusions  - Monitor O2 saturation  - Supplement to maintain > 90%   - Diuresis per cardio/ renal.     # JAMEL with Hyponatremia and Metabolic Acidosis   - Baseline CRE 0.7-1.2 and increased to ~4  - As per OSH documentation, JAMEL was thought to be second to Unasyn/ Bumex / Entresto use and Hypotension   - US RENAL with no hydronephrosis  - Likely cardiorenal induced with low flow state in ADHF, however now rising again and concern for milrinone vs overdiuresis (prerenal) vs cardiorenal.   - Milrinone adjusted and diuresis turned off, however no improvement/ worsened in renal function noted.   - s/p shiley placement and started on HD 2/20  - c/w HD per renal   - c/w high dose bumex 4mg IV QD, however remains oliguric .   - HF and renal following   - Avoid nephrotoxic agents  - Monitor Cr and daily BMP     # Transaminitis  - Likely 2/2 hepatic congestion   - Volume removal with HD as tolerated  - Trend LFTs, if uptrend post-HD initiation, check ABD US  - Serial ABD Examinations    # Hypernatremia   - Hypernatremia likely from HTS vs over diuresis/ intravascular dehydration   - Monitor sodium     # Leukocytosis likely in setting of BL LE ulcers with pop occlusion   - BCx negative   - UCx with probable contamination   - S/P unasyn for ABX.   - Leukocytosis fluctuating, however appears nontoxic with no fever spikes and monitoring closely on below unasyn.   - If febrile check pan cultures   - Trend CBC, temp curve, VS and adjust as tolerated    # Foot ulcers with worsening RLE pain second to pop artery occlusion +/- diabetic ulcers   - RLE Arterial Duplex with popliteal artery occlusion   - LLE Arterial Duplex with underlying disease cannot be excluded   - s/p angio with pop and AT VERA on 2/24   - Continue on Lipitor  - Continue on DAPT  - Continue pain control with oxy  - Wound care called for dressing recs   - Vascular following,     # Tachycardia likely pain related   - On Toprol and improved  - Continue to monitor on tele   - Cardio eval appreciated    # Visual Disturbance  - CT Head w/ old infarcts  - On ASA and Statin   - Opthalmology eval appreciated    # Indigestion and Constipation   - PPI, miralax and senna continued  - Monitor BMs    # Anemia likely mixed AOCD vs iron deficiency   - HH stable in 9s on HSQ  - Anemia panel with AOCD   - Started on venofer, but ferritin high and now stopped   - Continue on EPO with HD  - Monitor HH   - Transfuse for Hgb < 8  - Maintain active T/S    # Diabetes Mellitus A1C 11.6   - Continue on lantus 10 with tradjecta 5 and ISS   - Diabetic DASH diet   - Monitor and adjust glucose levels PRN   - Endocrine following; F/u recs     # HLD and HLD  - Continue on lipitor and toprol  - Monitor BP    # DISPO TBD  - Palliative care called and patient remains FULL CODE

## 2025-03-04 NOTE — CHART NOTE - NSCHARTNOTEFT_GEN_A_CORE
Cardiac Post PCI:            ****** Reasons for NO Cardiac rehab referral rx:                          Medical Reason: bilat foot wounds with h/o severe LE PAD      [place], NP

## 2025-03-04 NOTE — PROGRESS NOTE ADULT - ASSESSMENT
64y Female with history of CHF presents as a transfer for LE CO2 angiogram. Nephrology consulted for elevated Scr.    1) JAMEL: likely due to ATN and CRS for which patient initiated on RRT on 2/20. 1.5 L UF performed on 3/3 in order to optimize volume status prior to LHC on 3/4. On milrinone gtt as per HF. Monitor for renal recovery. Avoid nephrotoxins. Will assess electrolytes, volume status, urine output daily to assess of additional dialysis sessions are needed.    2) HTN: BP low normal. Continue with current medications. Will give midodrine prior to HD to avoid intradialytic hypotension.    3) LE edema: On bumex 4 mg IV daily. UF with HD. TTE with severely decreased LVSF. Monitor UO.     4) Anemia: Hb improving with low TSAT. No IV iron given elevated ferritin. S/P Epo 8K X 1 dose 3/1. Monitor Hb.      [facility] NEPHROLOGY  Raji Waterman M.D.  Mars Feliz D.O.  Kelley Parks M.D.  MD Nancy Kulkarni, MSN, ANP-C    Telephone: [phone]  Facsimile: [phone], [address]  [place] [address]

## 2025-03-04 NOTE — PROGRESS NOTE ADULT - PROBLEM SELECTOR PLAN 4
-nephrology following, plan for iHD; had UF 3/3 in anticiaptaion for LHC 3/4  -watching for renal recovery

## 2025-03-04 NOTE — CHART NOTE - NSCHARTNOTEFT_GEN_A_CORE
NUTRITION FOLLOW UP NOTE    PATIENT SEEN FOR: nutrition follow up / family requested RD visit     SOURCE: [x] Patient  [x] Current Medical Record  [] RN  [x] Family/support person at bedside  [] Patient unavailable/inappropriate  [] Other:    CHART REVIEWED/EVENTS NOTED.  [] No changes to nutrition care plan to note  [x] Nutrition Status:  - heart failure, milrinone gtt  - BL LE Edema likely in setting of ADHF, s/p angio with angioplasty with vascular   - Foot ulcers with worsening RLE pain second to pop artery occlusion +/- diabetic ulcers   - JAMEL with Hyponatremia and Metabolic Acidosis   - initiated on HD with last HD on 3/3 per chart    - s/p PCI 3/ per chart     DIET ORDER:   Diet, Regular:   Consistent Carbohydrate {No Snacks} (CSTCHO)  Low Sodium  No Concentrated Phosphorus  Halal  Lacto Veg (Accepts Milk & Milk Products) (25)      CURRENT DIET ORDER IS:  [] Appropriate:  [] Inadequate:  [x] Other: see recommendation below     NUTRITION INTAKE/PROVISION:  [x] PO: Pt reported good appetite at visit today, denied issues chewing/swallowing. 0-100% PO intake per flowsheets throughout admission.   [] Enteral Nutrition:  [] Parenteral Nutrition:    ANTHROPOMETRICS:  Drug Dosing Weight  Height (cm): 162.6 (2025 13:28)  Weight (kg): 72.1 (2025 13:28)  BMI (kg/m2): 27.3 (2025 13:28)  BSA (m2): 1.77 (2025 13:28)  Weights:   Daily Weight in k.8 (-), Weight in k.2 (), Weight in k (), Weight in k.5 (), Weight in k (-), Weight in k.7 (), Weight in k.5 ()   weight variance noted with prior edema per flowsheets and Dx CHF, on HD. Weight variance may be related to fluid shifts vs bed scale inaccuracies. RD will continue to monitor weight trends as able/available.   per HD weight 3/3 71.5 kg ; post HD weight 3/3 70 kg per flowsheets     MEDICATIONS:  MEDICATIONS  (STANDING):  aspirin enteric coated 81 milliGRAM(s) Oral daily  atorvastatin 40 milliGRAM(s) Oral at bedtime  benzocaine/menthol Lozenge 1 Lozenge Oral once  bisacodyl 5 milliGRAM(s) Oral every 12 hours  buMETAnide IVPB 4 milliGRAM(s) IV Intermittent daily  chlorhexidine 4% Liquid 1 Application(s) Topical <User Schedule>  clopidogrel Tablet 75 milliGRAM(s) Oral daily  clopidogrel Tablet      dextrose 5%. 1000 milliLiter(s) (50 mL/Hr) IV Continuous <Continuous>  dextrose 5%. 1000 milliLiter(s) (100 mL/Hr) IV Continuous <Continuous>  dextrose 50% Injectable 25 Gram(s) IV Push once  dextrose 50% Injectable 12.5 Gram(s) IV Push once  dextrose 50% Injectable 25 Gram(s) IV Push once  glucagon  Injectable 1 milliGRAM(s) IntraMuscular once  heparin   Injectable 5000 Unit(s) SubCutaneous every 8 hours  insulin glargine Injectable (LANTUS) 12 Unit(s) SubCutaneous at bedtime  insulin lispro (ADMELOG) corrective regimen sliding scale   SubCutaneous three times a day before meals  insulin lispro (ADMELOG) corrective regimen sliding scale   SubCutaneous at bedtime  insulin lispro Injectable (ADMELOG) 5 Unit(s) SubCutaneous three times a day with meals  isosorbide   dinitrate Tablet (ISORDIL) 5 milliGRAM(s) Oral three times a day  linagliptin 5 milliGRAM(s) Oral daily  milrinone Infusion 0.25 MICROgram(s)/kG/Min (5.41 mL/Hr) IV Continuous <Continuous>  pantoprazole  Injectable 40 milliGRAM(s) IV Push daily  polyethylene glycol 3350 17 Gram(s) Oral daily  senna 2 Tablet(s) Oral at bedtime    MEDICATIONS  (PRN):  acetaminophen     Tablet .. 650 milliGRAM(s) Oral every 6 hours PRN Mild Pain (1 - 3)  dextrose Oral Gel 15 Gram(s) Oral once PRN Blood Glucose LESS THAN 70 milliGRAM(s)/deciliter  HYDROmorphone  Injectable 1 milliGRAM(s) IV Push every 6 hours PRN Severe Pain (7 - 10)  melatonin 3 milliGRAM(s) Oral at bedtime PRN Insomnia  ondansetron Injectable 4 milliGRAM(s) IV Push every 6 hours PRN Nausea and/or Vomiting  sodium chloride 0.65% Nasal 1 Spray(s) Both Nostrils three times a day PRN Dryness  sodium chloride 0.9% lock flush 10 milliLiter(s) IV Push every 1 hour PRN Pre/post blood products, medications, blood draw, and to maintain line patency      NUTRITIONALLY PERTINENT LABS:   Na130 mmol/L[L] Glu 106 mg/dL[H] K+ 3.7 mmol/L Cr  2.36 mg/dL[H] BUN 30 mg/dL[H]  Phos 2.4 mg/dL[L]  Alb 3.2 g/dL[L] ALT 28 U/L AST 20 U/L Alkaline Phosphatase 112 U/L    A1C with Estimated Average Glucose Result: 11.6 % (25 @ 05:40)  A1C with Estimated Average Glucose Result: >15.5 % (24 @ 06:49)          Finger Sticks:  POCT Blood Glucose.: 136 mg/dL ( @ 11:21)  POCT Blood Glucose.: 113 mg/dL ( @ 07:23)  POCT Blood Glucose.: 103 mg/dL ( @ 21:16)  POCT Blood Glucose.: 113 mg/dL ( @ 16:57)      NUTRITIONALLY PERTINENT MEDICATIONS/LABS:  [x] Reviewed  [x] Relevant notes on medications/labs:  - recent fingersticks noted, PMH DM, insulin ordered ; endocrinology following   - low phosphorous noted 3/4, 3/3    EDEMA:  [x] Reviewed  [x] Relevant notes: no edema noted per flowsheets 3/4     GI/ I&O:  [x] Reviewed  [] Relevant notes:  [x] Other: Pt denies nausea, vomiting, diarrhea, constipation. bowel movement recorded 3/3 per flowsheets     SKIN:   [] No pressure injuries documented, per nursing flowsheet  [] Pressure injury previously noted  [x] Change in pressure injury documentation:  [x] Other:   - noted multiple non-pressure wounds per flowsheets: left 1st toe, B/L shins, and right dorsal foot  - stage 1 pressure injury per flowsheets, location not specified at this time     ESTIMATED NEEDS:  [x] No change:  [] Updated:  Energy: 7814-7012 kcal/day (30-35 kcal/kg)  Protein: 65-82 g/day (1.2-1.5 g/kg)  Fluid:   ml/day or [x] defer to team  Based on: IBW 54.4 kg     NUTRITION DIAGNOSIS:  [x] Prior Dx: Limited adherence to nutrition-related recommendations, increased nutrient needs   [] New Dx:    EDUCATION:  [x] Yes: Food preferences obtained from Pt, and will honor as able, to promote PO intake. Discussed food preferences compared to current dietary restrictions. Pt as made aware RD remains available and she expressed understanding.   [] Not appropriate/warranted    NUTRITION CARE PLAN:  1. Diet: Consider liberalizing current therapeutic diet restrictions and discontinuing No Concentrated Phosphorous restriction at this time due to low serum phosphorous per recent lab results. Defer fluid restriction to medical team discretion   2. Supplements: Recommend Nepro oral supplement once daily (provides 420 kcal, 19 grams protein per 8 oz) to help meet elevated needs.   3. Multivitamin/mineral supplementation: Recommend adding Nephrovite pending no medical contraindications, to optimize nutrient intake/promote wound healing.  4. Monitor PO intake, PO diet tolerance, skin, weight, nutrition related labs, GI function, goals of care      [] Achieved - Continue current nutrition intervention(s)  [] Current medical condition precludes nutrition intervention at this time.    MONITORING AND EVALUATION:   RD remains available upon request and will follow up per protocol.    Mary Tim MS, RDN, CDN; available on Teams

## 2025-03-04 NOTE — PROVIDER CONTACT NOTE (OTHER) - ASSESSMENT
Pt resting comfortably, asymptomatic, pt denies chest pain, SOB, dizziness, or any discomfort at this time.

## 2025-03-05 LAB
ANION GAP SERPL CALC-SCNC: 15 MMOL/L — SIGNIFICANT CHANGE UP (ref 5–17)
BUN SERPL-MCNC: 35 MG/DL — HIGH (ref 7–23)
CALCIUM SERPL-MCNC: 8.5 MG/DL — SIGNIFICANT CHANGE UP (ref 8.4–10.5)
CHLORIDE SERPL-SCNC: 88 MMOL/L — LOW (ref 96–108)
CO2 SERPL-SCNC: 25 MMOL/L — SIGNIFICANT CHANGE UP (ref 22–31)
CREAT SERPL-MCNC: 2.35 MG/DL — HIGH (ref 0.5–1.3)
EGFR: 23 ML/MIN/1.73M2 — LOW
EGFR: 23 ML/MIN/1.73M2 — LOW
GLUCOSE BLDC GLUCOMTR-MCNC: 141 MG/DL — HIGH (ref 70–99)
GLUCOSE BLDC GLUCOMTR-MCNC: 152 MG/DL — HIGH (ref 70–99)
GLUCOSE BLDC GLUCOMTR-MCNC: 172 MG/DL — HIGH (ref 70–99)
GLUCOSE BLDC GLUCOMTR-MCNC: 174 MG/DL — HIGH (ref 70–99)
GLUCOSE BLDC GLUCOMTR-MCNC: 206 MG/DL — HIGH (ref 70–99)
GLUCOSE SERPL-MCNC: 189 MG/DL — HIGH (ref 70–99)
HCT VFR BLD CALC: 27.9 % — LOW (ref 34.5–45)
HGB BLD-MCNC: 9.5 G/DL — LOW (ref 11.5–15.5)
MAGNESIUM SERPL-MCNC: 1.7 MG/DL — SIGNIFICANT CHANGE UP (ref 1.6–2.6)
MCHC RBC-ENTMCNC: 24.9 PG — LOW (ref 27–34)
MCHC RBC-ENTMCNC: 34.1 G/DL — SIGNIFICANT CHANGE UP (ref 32–36)
MCV RBC AUTO: 73.2 FL — LOW (ref 80–100)
NRBC BLD AUTO-RTO: 0 /100 WBCS — SIGNIFICANT CHANGE UP (ref 0–0)
PLATELET # BLD AUTO: 198 K/UL — SIGNIFICANT CHANGE UP (ref 150–400)
POTASSIUM SERPL-MCNC: 3.6 MMOL/L — SIGNIFICANT CHANGE UP (ref 3.5–5.3)
POTASSIUM SERPL-SCNC: 3.6 MMOL/L — SIGNIFICANT CHANGE UP (ref 3.5–5.3)
RBC # BLD: 3.81 M/UL — SIGNIFICANT CHANGE UP (ref 3.8–5.2)
RBC # FLD: 22.2 % — HIGH (ref 10.3–14.5)
SODIUM SERPL-SCNC: 128 MMOL/L — LOW (ref 135–145)
WBC # BLD: 10.51 K/UL — HIGH (ref 3.8–10.5)
WBC # FLD AUTO: 10.51 K/UL — HIGH (ref 3.8–10.5)

## 2025-03-05 PROCEDURE — 99232 SBSQ HOSP IP/OBS MODERATE 35: CPT

## 2025-03-05 PROCEDURE — 99233 SBSQ HOSP IP/OBS HIGH 50: CPT | Mod: GC

## 2025-03-05 PROCEDURE — 99231 SBSQ HOSP IP/OBS SF/LOW 25: CPT

## 2025-03-05 RX ORDER — HYDROMORPHONE/SOD CHLOR,ISO/PF 2 MG/10 ML
0.5 SYRINGE (ML) INJECTION ONCE
Refills: 0 | Status: DISCONTINUED | OUTPATIENT
Start: 2025-03-05 | End: 2025-03-05

## 2025-03-05 RX ORDER — INSULIN GLARGINE-YFGN 100 [IU]/ML
13 INJECTION, SOLUTION SUBCUTANEOUS AT BEDTIME
Refills: 0 | Status: DISCONTINUED | OUTPATIENT
Start: 2025-03-05 | End: 2025-03-07

## 2025-03-05 RX ADMIN — INSULIN LISPRO 5 UNIT(S): 100 INJECTION, SOLUTION INTRAVENOUS; SUBCUTANEOUS at 17:54

## 2025-03-05 RX ADMIN — INSULIN GLARGINE-YFGN 13 UNIT(S): 100 INJECTION, SOLUTION SUBCUTANEOUS at 21:40

## 2025-03-05 RX ADMIN — Medication 1 APPLICATION(S): at 08:32

## 2025-03-05 RX ADMIN — HEPARIN SODIUM 5000 UNIT(S): 1000 INJECTION INTRAVENOUS; SUBCUTANEOUS at 14:17

## 2025-03-05 RX ADMIN — ATORVASTATIN CALCIUM 40 MILLIGRAM(S): 80 TABLET, FILM COATED ORAL at 21:46

## 2025-03-05 RX ADMIN — INSULIN LISPRO 2: 100 INJECTION, SOLUTION INTRAVENOUS; SUBCUTANEOUS at 08:30

## 2025-03-05 RX ADMIN — Medication 2 TABLET(S): at 21:46

## 2025-03-05 RX ADMIN — Medication 1 MILLIGRAM(S): at 23:30

## 2025-03-05 RX ADMIN — LINAGLIPTIN 5 MILLIGRAM(S): 5 TABLET, FILM COATED ORAL at 12:39

## 2025-03-05 RX ADMIN — INSULIN LISPRO 5 UNIT(S): 100 INJECTION, SOLUTION INTRAVENOUS; SUBCUTANEOUS at 08:32

## 2025-03-05 RX ADMIN — HEPARIN SODIUM 5000 UNIT(S): 1000 INJECTION INTRAVENOUS; SUBCUTANEOUS at 21:43

## 2025-03-05 RX ADMIN — INSULIN LISPRO 1: 100 INJECTION, SOLUTION INTRAVENOUS; SUBCUTANEOUS at 12:39

## 2025-03-05 RX ADMIN — Medication 1 MILLIGRAM(S): at 05:45

## 2025-03-05 RX ADMIN — Medication 1 MILLIGRAM(S): at 20:31

## 2025-03-05 RX ADMIN — INSULIN LISPRO 1: 100 INJECTION, SOLUTION INTRAVENOUS; SUBCUTANEOUS at 17:53

## 2025-03-05 RX ADMIN — Medication 40 MILLIGRAM(S): at 12:39

## 2025-03-05 RX ADMIN — Medication 0.5 MILLIGRAM(S): at 23:20

## 2025-03-05 RX ADMIN — MILRINONE LACTATE 5.41 MICROGRAM(S)/KG/MIN: 1 INJECTION, SOLUTION INTRAVENOUS at 20:30

## 2025-03-05 RX ADMIN — Medication 1 MILLIGRAM(S): at 04:45

## 2025-03-05 RX ADMIN — BUMETANIDE 132 MILLIGRAM(S): 1 TABLET ORAL at 05:29

## 2025-03-05 RX ADMIN — CLOPIDOGREL BISULFATE 75 MILLIGRAM(S): 75 TABLET, FILM COATED ORAL at 12:39

## 2025-03-05 RX ADMIN — Medication 81 MILLIGRAM(S): at 12:38

## 2025-03-05 RX ADMIN — Medication 1 TABLET(S): at 12:42

## 2025-03-05 RX ADMIN — INSULIN LISPRO 5 UNIT(S): 100 INJECTION, SOLUTION INTRAVENOUS; SUBCUTANEOUS at 12:41

## 2025-03-05 RX ADMIN — HEPARIN SODIUM 5000 UNIT(S): 1000 INJECTION INTRAVENOUS; SUBCUTANEOUS at 04:45

## 2025-03-05 NOTE — PROGRESS NOTE ADULT - SUBJECTIVE AND OBJECTIVE BOX
Seen earlier today     Chief Complaint: Diabetes Mellitus follow up    INTERVAL HX: s/p high risk PCI done yesterday " Feel good" Tolerating POs, eats well. Denies eating snacks between meals or overnight. Last 24 hour BGs 146-206 with elevated fasting . Continue to be on Bumex and milrinone gtt       Review of Systems:  General: As above  GI: No nausea, vomiting  Endocrine: no  S&Sx of hypoglycemia    Allergies    No Known Allergies    Intolerances    Augmentin (Stomach Upset; Vomiting; Nausea)    MEDICATIONS  (STANDING):  aspirin enteric coated 81 milliGRAM(s) Oral daily  atorvastatin 40 milliGRAM(s) Oral at bedtime  benzocaine/menthol Lozenge 1 Lozenge Oral once  bisacodyl 5 milliGRAM(s) Oral every 12 hours  buMETAnide IVPB 4 milliGRAM(s) IV Intermittent daily  chlorhexidine 4% Liquid 1 Application(s) Topical <User Schedule>  clopidogrel Tablet 75 milliGRAM(s) Oral daily  clopidogrel Tablet      dextrose 5%. 1000 milliLiter(s) (100 mL/Hr) IV Continuous <Continuous>  dextrose 5%. 1000 milliLiter(s) (50 mL/Hr) IV Continuous <Continuous>  dextrose 50% Injectable 25 Gram(s) IV Push once  dextrose 50% Injectable 12.5 Gram(s) IV Push once  dextrose 50% Injectable 25 Gram(s) IV Push once  glucagon  Injectable 1 milliGRAM(s) IntraMuscular once  heparin   Injectable 5000 Unit(s) SubCutaneous every 8 hours  insulin glargine Injectable (LANTUS) 12 Unit(s) SubCutaneous at bedtime  insulin lispro (ADMELOG) corrective regimen sliding scale   SubCutaneous three times a day before meals  insulin lispro (ADMELOG) corrective regimen sliding scale   SubCutaneous at bedtime  insulin lispro Injectable (ADMELOG) 5 Unit(s) SubCutaneous three times a day with meals  isosorbide   dinitrate Tablet (ISORDIL) 5 milliGRAM(s) Oral three times a day  linagliptin 5 milliGRAM(s) Oral daily  milrinone Infusion 0.25 MICROgram(s)/kG/Min (5.41 mL/Hr) IV Continuous <Continuous>  Nephro-rober 1 Tablet(s) Oral daily  pantoprazole  Injectable 40 milliGRAM(s) IV Push daily  polyethylene glycol 3350 17 Gram(s) Oral daily  senna 2 Tablet(s) Oral at bedtime      atorvastatin   40 milliGRAM(s) Oral (03-04-25 @ 21:45)    insulin glargine Injectable (LANTUS)   12 Unit(s) SubCutaneous (03-04-25 @ 21:44)    insulin lispro (ADMELOG) corrective regimen sliding scale   1 Unit(s) SubCutaneous (03-05-25 @ 12:39)   2 Unit(s) SubCutaneous (03-05-25 @ 08:30)   1 Unit(s) SubCutaneous (03-04-25 @ 17:45)    insulin lispro Injectable (ADMELOG)   5 Unit(s) SubCutaneous (03-05-25 @ 12:41)   5 Unit(s) SubCutaneous (03-05-25 @ 08:32)   5 Unit(s) SubCutaneous (03-04-25 @ 17:45)    linagliptin   5 milliGRAM(s) Oral (03-05-25 @ 12:39)        PHYSICAL EXAM:  VITALS: T(C): 36.8 (03-05-25 @ 13:27)  T(F): 98.2 (03-05-25 @ 13:27), Max: 99 (03-05-25 @ 04:37)  HR: 125 (03-05-25 @ 13:27) (100 - 125)  BP: 103/67 (03-05-25 @ 13:27) (86/47 - 107/75)  RR:  (17 - 18)  SpO2:  (95% - 99%)  Wt(kg): --  GENERAL: Female laying in bed, in NAD  Respiratory: Respirations unlabored   Extremities: Warm, no edema  NEURO: Alert , appropriate     LABS:  POCT Blood Glucose.: 172 mg/dL (03-05-25 @ 12:37)  POCT Blood Glucose.: 174 mg/dL (03-05-25 @ 12:19)  POCT Blood Glucose.: 206 mg/dL (03-05-25 @ 08:25)  POCT Blood Glucose.: 146 mg/dL (03-04-25 @ 21:21)  POCT Blood Glucose.: 187 mg/dL (03-04-25 @ 17:32)  POCT Blood Glucose.: 136 mg/dL (03-04-25 @ 11:21)  POCT Blood Glucose.: 113 mg/dL (03-04-25 @ 07:23)  POCT Blood Glucose.: 103 mg/dL (03-03-25 @ 21:16)  POCT Blood Glucose.: 113 mg/dL (03-03-25 @ 16:57)  POCT Blood Glucose.: 121 mg/dL (03-03-25 @ 12:49)  POCT Blood Glucose.: 93 mg/dL (03-03-25 @ 12:14)  POCT Blood Glucose.: 191 mg/dL (03-03-25 @ 08:18)  POCT Blood Glucose.: 186 mg/dL (03-02-25 @ 21:22)  POCT Blood Glucose.: 178 mg/dL (03-02-25 @ 16:51)                          9.5    10.51 )-----------( 198      ( 05 Mar 2025 08:39 )             27.9     03-05    128[L]  |  88[L]  |  35[H]  ----------------------------<  189[H]  3.6   |  25  |  2.35[H]    Ca    8.5      05 Mar 2025 08:43  Phos  2.4     03-04  Mg     1.7     03-05      eGFR: 23 mL/min/1.73m2 (05 Mar 2025 08:43)    01-24 Chol 122 Direct LDL -- LDL calculated 66 HDL 39[L] Trig 89  Thyroid Function Tests:      A1C with Estimated Average Glucose Result: 11.6 % (01-24-25 @ 05:40)  A1C with Estimated Average Glucose Result: >15.5 % (12-13-24 @ 06:49)    Estimated Average Glucose: 286 mg/dL (01-24-25 @ 05:40)  Estimated Average Glucose: >398 mg/dL (12-13-24 @ 06:49)        Diet, Regular:   Consistent Carbohydrate No Snacks (CSTCHO)  Low Sodium  No Concentrated Phosphorus  Halal  Lacto Veg (Accepts Milk & Milk Products)  Supplement Feeding Modality:  Oral  Nepro Cans or Servings Per Day:  1       Frequency:  Daily (03-04-25 @ 17:11) [Active]             Seen earlier today     Chief Complaint: Diabetes Mellitus follow up    INTERVAL HX: s/p high risk PCI done yesterday " Feel good" Tolerating POs, eats well. Denies eating snacks between meals or overnight. Last 24 hour BGs 146-206 with elevated fasting . Continue to be on Bumex IV and milrinone gtt and on iHD       Review of Systems:  General: As above  GI: No nausea, vomiting  Endocrine: no  S&Sx of hypoglycemia    Allergies    No Known Allergies    Intolerances    Augmentin (Stomach Upset; Vomiting; Nausea)    MEDICATIONS  (STANDING):  aspirin enteric coated 81 milliGRAM(s) Oral daily  atorvastatin 40 milliGRAM(s) Oral at bedtime  benzocaine/menthol Lozenge 1 Lozenge Oral once  bisacodyl 5 milliGRAM(s) Oral every 12 hours  buMETAnide IVPB 4 milliGRAM(s) IV Intermittent daily  chlorhexidine 4% Liquid 1 Application(s) Topical <User Schedule>  clopidogrel Tablet 75 milliGRAM(s) Oral daily  clopidogrel Tablet      dextrose 5%. 1000 milliLiter(s) (100 mL/Hr) IV Continuous <Continuous>  dextrose 5%. 1000 milliLiter(s) (50 mL/Hr) IV Continuous <Continuous>  dextrose 50% Injectable 25 Gram(s) IV Push once  dextrose 50% Injectable 12.5 Gram(s) IV Push once  dextrose 50% Injectable 25 Gram(s) IV Push once  glucagon  Injectable 1 milliGRAM(s) IntraMuscular once  heparin   Injectable 5000 Unit(s) SubCutaneous every 8 hours  insulin glargine Injectable (LANTUS) 12 Unit(s) SubCutaneous at bedtime  insulin lispro (ADMELOG) corrective regimen sliding scale   SubCutaneous three times a day before meals  insulin lispro (ADMELOG) corrective regimen sliding scale   SubCutaneous at bedtime  insulin lispro Injectable (ADMELOG) 5 Unit(s) SubCutaneous three times a day with meals  isosorbide   dinitrate Tablet (ISORDIL) 5 milliGRAM(s) Oral three times a day  linagliptin 5 milliGRAM(s) Oral daily  milrinone Infusion 0.25 MICROgram(s)/kG/Min (5.41 mL/Hr) IV Continuous <Continuous>  Nephro-rober 1 Tablet(s) Oral daily  pantoprazole  Injectable 40 milliGRAM(s) IV Push daily  polyethylene glycol 3350 17 Gram(s) Oral daily  senna 2 Tablet(s) Oral at bedtime      atorvastatin   40 milliGRAM(s) Oral (03-04-25 @ 21:45)    insulin glargine Injectable (LANTUS)   12 Unit(s) SubCutaneous (03-04-25 @ 21:44)    insulin lispro (ADMELOG) corrective regimen sliding scale   1 Unit(s) SubCutaneous (03-05-25 @ 12:39)   2 Unit(s) SubCutaneous (03-05-25 @ 08:30)   1 Unit(s) SubCutaneous (03-04-25 @ 17:45)    insulin lispro Injectable (ADMELOG)   5 Unit(s) SubCutaneous (03-05-25 @ 12:41)   5 Unit(s) SubCutaneous (03-05-25 @ 08:32)   5 Unit(s) SubCutaneous (03-04-25 @ 17:45)    linagliptin   5 milliGRAM(s) Oral (03-05-25 @ 12:39)        PHYSICAL EXAM:  VITALS: T(C): 36.8 (03-05-25 @ 13:27)  T(F): 98.2 (03-05-25 @ 13:27), Max: 99 (03-05-25 @ 04:37)  HR: 125 (03-05-25 @ 13:27) (100 - 125)  BP: 103/67 (03-05-25 @ 13:27) (86/47 - 107/75)  RR:  (17 - 18)  SpO2:  (95% - 99%)  Wt(kg): --  GENERAL: Female laying in bed, in NAD  Respiratory: Respirations unlabored   Extremities: Warm, no edema  NEURO: Alert , appropriate     LABS:  POCT Blood Glucose.: 172 mg/dL (03-05-25 @ 12:37)  POCT Blood Glucose.: 174 mg/dL (03-05-25 @ 12:19)  POCT Blood Glucose.: 206 mg/dL (03-05-25 @ 08:25)  POCT Blood Glucose.: 146 mg/dL (03-04-25 @ 21:21)  POCT Blood Glucose.: 187 mg/dL (03-04-25 @ 17:32)  POCT Blood Glucose.: 136 mg/dL (03-04-25 @ 11:21)  POCT Blood Glucose.: 113 mg/dL (03-04-25 @ 07:23)  POCT Blood Glucose.: 103 mg/dL (03-03-25 @ 21:16)  POCT Blood Glucose.: 113 mg/dL (03-03-25 @ 16:57)  POCT Blood Glucose.: 121 mg/dL (03-03-25 @ 12:49)  POCT Blood Glucose.: 93 mg/dL (03-03-25 @ 12:14)  POCT Blood Glucose.: 191 mg/dL (03-03-25 @ 08:18)  POCT Blood Glucose.: 186 mg/dL (03-02-25 @ 21:22)  POCT Blood Glucose.: 178 mg/dL (03-02-25 @ 16:51)                          9.5    10.51 )-----------( 198      ( 05 Mar 2025 08:39 )             27.9     03-05    128[L]  |  88[L]  |  35[H]  ----------------------------<  189[H]  3.6   |  25  |  2.35[H]    Ca    8.5      05 Mar 2025 08:43  Phos  2.4     03-04  Mg     1.7     03-05      eGFR: 23 mL/min/1.73m2 (05 Mar 2025 08:43)    01-24 Chol 122 Direct LDL -- LDL calculated 66 HDL 39[L] Trig 89  Thyroid Function Tests:      A1C with Estimated Average Glucose Result: 11.6 % (01-24-25 @ 05:40)  A1C with Estimated Average Glucose Result: >15.5 % (12-13-24 @ 06:49)    Estimated Average Glucose: 286 mg/dL (01-24-25 @ 05:40)  Estimated Average Glucose: >398 mg/dL (12-13-24 @ 06:49)        Diet, Regular:   Consistent Carbohydrate No Snacks (CSTCHO)  Low Sodium  No Concentrated Phosphorus  Halal  Lacto Veg (Accepts Milk & Milk Products)  Supplement Feeding Modality:  Oral  Nepro Cans or Servings Per Day:  1       Frequency:  Daily (03-04-25 @ 17:11) [Active]

## 2025-03-05 NOTE — ADVANCED PRACTICE NURSE CONSULT - REASON FOR CONSULT
Requested by staff to assess skin status; sacrum. PMH is noted:  65 YO F with PMHx of HFrEF 30-35 and grade 2 ddfxn (last TTE on 01/16/25), DM2, and diabetic foot ulcer. Recent admission at [facility] for ADHF. Patient now represents to OSH and ultimately to [facility] as a transfer for worsening right leg pain and fluid secretion. As per documentation, patient was reported to have severe depletion of RLE blood flow beyond the popliteal vessel at knee and similar findings in the left. Patient was transferred to [facility] for CO2 angiogram with Dr. Baltazar. Of note, patient also with reported visual disturbance for which patient was seen and evaluated by opthalmology. Internal Medicine has been consulted on Ms. Kirby's care for medical management.

## 2025-03-05 NOTE — PROGRESS NOTE ADULT - ASSESSMENT
63 y/o F with PMH of CHF (last TTE 1/2025 with EF 30-35%), DM, diabetic foot ulcer, PAD, CAD. Recent admission at [facility] for CHF exacerbation, presented to [facility] as a transfer for worsening R leg pain. Hospital course c/b acute on chronic CHF - TTE with EF 20%, severely decreased LV and RV function, multiple regional motion abnormalities, s/p LHC revealing TVD, pleural/pericardial effusions, JAMEL, leukocytosis suspected to be in the setting of LE wound on IV ABX, persistent RLE pain w/ RLE Arterial Duplex revealing popliteal artery occlusion  Plan for eventual angiogram with vascular when medically optimized. Pulmonary called to consult as pt with c/o SOB.

## 2025-03-05 NOTE — PROGRESS NOTE ADULT - PROBLEM SELECTOR PLAN 1
Inpatient Plan:  - Check BG TID AC and HS while on PO diet  - fasting BG above goal, slightly increase Lantus  to 13 u QHS  - C/w Admelog 5u TID AC (HOLD if NPO)  - C/w oral Tradjenta 5mg once daily   - C/w low dose Admelog correctional scales TID AC and HS    Discharge Planning:   - Likely basal/bolus regimen given kidney function (doses TBD closer to d/c). Not a candidate for SGLT2 due to leg ulcers and also significantly decreased EGFR., can consider GLP1 in addition to Basal/bolus> will need close f/u with Optho due to h/o retinopathy.   Can send Rx for ozempic 0.25mg weekly x 4weeks and increase to 0.50mg, or mounjaro 2.5mg 2.5mg x4 weeks, then increase to 5.0mg weekly. (Patient denies medullary thyroid cancer, hx of pancreatitis or MEN2)  - Please make sure patient has DM management supplies (glucometer, lancets, strips, alcohol pads, insulin pen needles).  - Patient should check BGs before meals and at bedtime. Contact PCP/endocrinology if BG is less than 70 X1, greater than  400 X1 or persistently greater than 200s.   - Endocrine follow up: can f/u with Dr. Grace, Endocrinology.   - Needs Optho/ renal/cardiac /podiatry and vascular follow up Inpatient Plan:  - Check BG TID AC and HS while on PO diet  - fasting BG above goal, slightly increase Lantus  to 13 u QHS  - C/w Admelog 5u TID AC (HOLD if NPO)  - Will stop  Tradjenta 5mg once daily as pt is on Admelog 5 units with meals   - C/w low dose Admelog correctional scales TID AC and HS    Discharge Planning:   - Likely basal/bolus regimen given kidney function (doses TBD closer to d/c). Not a candidate for SGLT2 due to leg ulcers and also significantly decreased EGFR., can consider GLP1 in addition to Basal/bolus> will need close f/u with Optho due to h/o retinopathy.   Can send Rx for ozempic 0.25mg weekly x 4weeks and increase to 0.50mg, or mounjaro 2.5mg 2.5mg x4 weeks, then increase to 5.0mg weekly. (Patient denies medullary thyroid cancer, hx of pancreatitis or MEN2)  - Please make sure patient has DM management supplies (glucometer, lancets, strips, alcohol pads, insulin pen needles).  - Patient should check BGs before meals and at bedtime. Contact PCP/endocrinology if BG is less than 70 X1, greater than  400 X1 or persistently greater than 200s.   - Endocrine follow up: can f/u with Dr. Grace, Endocrinology.   - Needs Optho/ renal/cardiac /podiatry and vascular follow up

## 2025-03-05 NOTE — ADVANCED PRACTICE NURSE CONSULT - REASON FOR CONSULT
Requested by staff to assess skin status: Sacrum. PMH is noted;  ·63 YO F with PMHx of HFrEF 30-35 and grade 2 ddfxn (last TTE on 01/16/25), DM2, and diabetic foot ulcer. Recent admission at [facility] for ADHF. Patient now represents to OSH and ultimately to [facility] as a transfer for worsening right leg pain and fluid secretion. As per documentation, patient was reported to have severe depletion of RLE blood flow beyond the popliteal vessel at knee and similar findings in the left. Patient was transferred to [facility] for CO2 angiogram with Dr. Baltazar. Of note, patient also with reported visual disturbance for which patient was seen and evaluated by opthalmology. Internal Medicine has been consulted on Ms. Kirby's care for medical management.   the pt is s/p angioplasty on 2/24.         Assessment:  · Assessment	  the pt was encountered on 2DSU. Mrs Kirby was awake and alert, engaging in conversation.

## 2025-03-05 NOTE — PROGRESS NOTE ADULT - SUBJECTIVE AND OBJECTIVE BOX
Follow-up Pulm Progress Note    cough much better  denies SOB/CP     Medications:  MEDICATIONS  (STANDING):  aspirin enteric coated 81 milliGRAM(s) Oral daily  atorvastatin 40 milliGRAM(s) Oral at bedtime  benzocaine/menthol Lozenge 1 Lozenge Oral once  bisacodyl 5 milliGRAM(s) Oral every 12 hours  buMETAnide IVPB 4 milliGRAM(s) IV Intermittent daily  chlorhexidine 4% Liquid 1 Application(s) Topical <User Schedule>  clopidogrel Tablet 75 milliGRAM(s) Oral daily  clopidogrel Tablet      dextrose 5%. 1000 milliLiter(s) (100 mL/Hr) IV Continuous <Continuous>  dextrose 5%. 1000 milliLiter(s) (50 mL/Hr) IV Continuous <Continuous>  dextrose 50% Injectable 25 Gram(s) IV Push once  dextrose 50% Injectable 12.5 Gram(s) IV Push once  dextrose 50% Injectable 25 Gram(s) IV Push once  glucagon  Injectable 1 milliGRAM(s) IntraMuscular once  heparin   Injectable 5000 Unit(s) SubCutaneous every 8 hours  insulin glargine Injectable (LANTUS) 12 Unit(s) SubCutaneous at bedtime  insulin lispro (ADMELOG) corrective regimen sliding scale   SubCutaneous three times a day before meals  insulin lispro (ADMELOG) corrective regimen sliding scale   SubCutaneous at bedtime  insulin lispro Injectable (ADMELOG) 5 Unit(s) SubCutaneous three times a day with meals  isosorbide   dinitrate Tablet (ISORDIL) 5 milliGRAM(s) Oral three times a day  linagliptin 5 milliGRAM(s) Oral daily  milrinone Infusion 0.25 MICROgram(s)/kG/Min (5.41 mL/Hr) IV Continuous <Continuous>  Nephro-rober 1 Tablet(s) Oral daily  pantoprazole  Injectable 40 milliGRAM(s) IV Push daily  polyethylene glycol 3350 17 Gram(s) Oral daily  senna 2 Tablet(s) Oral at bedtime    MEDICATIONS  (PRN):  acetaminophen     Tablet .. 650 milliGRAM(s) Oral every 6 hours PRN Mild Pain (1 - 3)  dextrose Oral Gel 15 Gram(s) Oral once PRN Blood Glucose LESS THAN 70 milliGRAM(s)/deciliter  HYDROmorphone  Injectable 1 milliGRAM(s) IV Push every 6 hours PRN Severe Pain (7 - 10)  melatonin 3 milliGRAM(s) Oral at bedtime PRN Insomnia  ondansetron Injectable 4 milliGRAM(s) IV Push every 6 hours PRN Nausea and/or Vomiting  sodium chloride 0.65% Nasal 1 Spray(s) Both Nostrils three times a day PRN Dryness  sodium chloride 0.9% lock flush 10 milliLiter(s) IV Push every 1 hour PRN Pre/post blood products, medications, blood draw, and to maintain line patency          Vital Signs Last 24 Hrs  T(C): 36.8 (05 Mar 2025 13:27), Max: 37.2 (05 Mar 2025 04:37)  T(F): 98.2 (05 Mar 2025 13:27), Max: 99 (05 Mar 2025 04:37)  HR: 125 (05 Mar 2025 13:27) (100 - 125)  BP: 103/67 (05 Mar 2025 13:27) (86/47 - 107/75)  BP(mean): 60 (04 Mar 2025 16:12) (60 - 60)  RR: 17 (05 Mar 2025 13:27) (17 - 18)  SpO2: 96% (05 Mar 2025 13:27) (95% - 99%)    Parameters below as of 05 Mar 2025 13:27  Patient On (Oxygen Delivery Method): room air              03-04 @ 07:01  -  03-05 @ 07:00  --------------------------------------------------------  IN: 240 mL / OUT: 1200 mL / NET: -960 mL          LABS:                        9.5    10.51 )-----------( 198      ( 05 Mar 2025 08:39 )             27.9     03-05    128[L]  |  88[L]  |  35[H]  ----------------------------<  189[H]  3.6   |  25  |  2.35[H]    Ca    8.5      05 Mar 2025 08:43  Phos  2.4     03-04  Mg     1.7     03-05            CAPILLARY BLOOD GLUCOSE      POCT Blood Glucose.: 172 mg/dL (05 Mar 2025 12:37)      Urinalysis Basic - ( 05 Mar 2025 08:43 )    Color: x / Appearance: x / SG: x / pH: x  Gluc: 189 mg/dL / Ketone: x  / Bili: x / Urobili: x   Blood: x / Protein: x / Nitrite: x   Leuk Esterase: x / RBC: x / WBC x   Sq Epi: x / Non Sq Epi: x / Bacteria: x              Physical Examination:  PULM: coarse at bases   CVS: S1, S2 heard    RADIOLOGY REVIEWED  CXR 2/27: congestion   bibasilar atelectasis/effusions     CT chest:< from: CT Chest No Cont (01.25.25 @ 14:08) >  ACC: [id number] EXAM:  CT CHEST   ORDERED BY: TED MONROY     PROCEDURE DATE:  01/25/2025        INTERPRETATION:  Clinical information: Shortness of breath.    CT scan of the chest was obtained without administration of intravenous   contrast.    Several lymph nodes are present in the mediastinum.    Heart is enlarged in size. Calcification of the coronary arteries is   noted. Small pericardial effusion is noted.    No endobronchial lesions are noted. Patchy groundglass opacities noted   within both lungs are felt to be secondary to expiratory   technique/breathing artifact. Atelectasis is noted involving portions of   both lower lobes. This is secondary to small bilateral pleural effusions.    Below the diaphragm, visualized portions of the abdomen demonstrate   patient to be status post cholecystectomy.    Degenerative changes of the spine are noted.    IMPRESSION: Small bilateral pleural effusions and small pericardial   effusion.    --- End of Report ---    < end of copied text >

## 2025-03-05 NOTE — PROGRESS NOTE ADULT - SUBJECTIVE AND OBJECTIVE BOX
[facility] NEPHROLOGY- PROGRESS NOTE    64y Female with history of CHF presents as a transfer for LE CO2 angiogram. Nephrology consulted for elevated Scr.      REVIEW OF SYSTEMS:  Gen: no fevers  Cards: no chest pain  Resp: no dyspnea  GI: no nausea or vomiting or diarrhea  Vascular: + LE edema improving    Augmentin (Stomach Upset; Vomiting; Nausea)  No Known Allergies      Hospital Medications: Medications reviewed        VITALS:  T(F): 99 (03-05-25 @ 04:37), Max: 99 (03-05-25 @ 04:37)  HR: 113 (03-05-25 @ 09:00)  BP: 93/55 (03-05-25 @ 09:00)  RR: 18 (03-05-25 @ 04:37)  SpO2: 95% (03-05-25 @ 04:37)  Wt(kg): --    03-04 @ 07:01  -  03-05 @ 07:00  --------------------------------------------------------  IN: 240 mL / OUT: 1200 mL / NET: -960 mL    03-05 @ 07:01  -  03-05 @ 11:34  --------------------------------------------------------  IN: 120 mL / OUT: 0 mL / NET: 120 mL          PHYSICAL EXAM:    Gen: NAD, calm  Cards: RRR, +S1/S2, no M/G/R  Resp: CTA B/L  GI: soft, NT/ND, NABS  : + stiles with cloudy urine  Vascular: + LE wrapped B/L with RLE edema > LLE edema, + RIJ subclavian        LABS:  03-05    128[L]  |  88[L]  |  35[H]  ----------------------------<  189[H]  3.6   |  25  |  2.35[H]    Ca    8.5      05 Mar 2025 08:43  Phos  2.4     03-04  Mg     1.7     03-05      Creatinine Trend: 2.35 <--, 2.36 <--, 2.55 <--, 2.23 <--, 3.65 <--, 3.12 <--                        9.5    10.51 )-----------( 198      ( 05 Mar 2025 08:39 )             27.9     Urine Studies:  Urinalysis Basic - ( 05 Mar 2025 08:43 )    Color:  / Appearance:  / SG:  / pH:   Gluc: 189 mg/dL / Ketone:   / Bili:  / Urobili:    Blood:  / Protein:  / Nitrite:    Leuk Esterase:  / RBC:  / WBC    Sq Epi:  / Non Sq Epi:  / Bacteria:

## 2025-03-05 NOTE — PROGRESS NOTE ADULT - SUBJECTIVE AND OBJECTIVE BOX
MR#[id number]  PATIENT NAME:COLE ALBA    DATE OF SERVICE: 25 @ 07:11  Patient was seen and examined by Yordy Gilmore MD on    25 @ 07:11 .  Interim events noted.Consultant notes ,Labs,Telemetry reviewed by me       HOSPITAL COURSE: HPI:  64F, hx of CHF (last TTE on 25 EF 30-35%, G2DD), DM, diabetic foot ulcer with a recent admission at [facility] for CHF exacerbation presented as a transfer for worsening R. leg pain and fluid secretion, On non-invasive imaging demonstrating severe depletion of RLE blood flow beyond the popliteal vessel at knee and similar findings in L. Was transferred to [facility] for CO2 angiogram with Dr. Baltazar. (2025 20:05)      INTERIM EVENTS:Patient seen at bedside ,interim events noted.  -Had cath Multivessel CAD started on Milrinone for CTS evaluation albeit targets not optimal  02/15-Awake on Milrinone and Bumex gtt-seen by CTS not a surgical candidate-needs Vascular work-up for LE PAD-scheduled for Angiogram on Wednesday    Weight 170Kg---> 164 Kg--->161.1 Kg---151.2 ---> 155 --> 154.7 ---> 158 --> 148--->147 >  Kg    -s/p RLE angiogram with pop and AT angioplasty   -Awake had iHD plan for High risk PCI oLM/LAD tomorrow 1000mL removed  Had ? pAF ~~3 secs  -Awake no dyspnea chest pain plan for PCI-oLM/LAD todat HD after PCI-Sinus rhythm  -Had [facility] PCWP-15 still elevated with low BP Plan to dialyse over weekend and plan for PCI on Monday-No dyspnea  -Awake had HD removed 1500Ml no dyspnea Plan for PCI tomorrow  -Awake Sinus rhythm no dyspnea FOR High risk PCI oLM/LAD   -Awake had HD last night-1500mL removed For High risk PCI today  -Awake s/p PCI     PMH -reviewed admission note, no change since admission  HEART FAILURE: Acute[x ]Chronic[ ] Systolic[x ] Diastolic[ ] Combined Systolic and Diastolic[ ]  CAD[x ] CABG[ ] PCI[ ]  DEVICES[ ] PPM[ ] ICD[ ] ILR[ ]  ATRIAL FIBRILLATION[ ] Paroxysmal[ ] Permanent[ ] CHADS2-[  ]  AJMEL[x ] CKD1[ ] CKD2[ ] CKD3[ ] CKD4[x ] ESRD[ ]  COPD[ ] HTN[x ]   DM[x ] Type1[ ] Type 2[x ]   CVA[ ] Paresis[ ]    AMBULATION: Assisted[x ] Cane/walker[ ] Independent[ ]  MEDICATIONS  (STANDING):  aspirin enteric coated 81 milliGRAM(s) Oral daily  atorvastatin 40 milliGRAM(s) Oral at bedtime  benzocaine/menthol Lozenge 1 Lozenge Oral once  bisacodyl 5 milliGRAM(s) Oral every 12 hours  buMETAnide IVPB 4 milliGRAM(s) IV Intermittent daily  chlorhexidine 4% Liquid 1 Application(s) Topical <User Schedule>  clopidogrel Tablet 75 milliGRAM(s) Oral daily  clopidogrel Tablet      glucagon  Injectable 1 milliGRAM(s) IntraMuscular once  heparin   Injectable 5000 Unit(s) SubCutaneous every 8 hours  insulin glargine Injectable (LANTUS) 12 Unit(s) SubCutaneous at bedtime  insulin lispro (ADMELOG) corrective regimen sliding scale   SubCutaneous three times a day before meals  insulin lispro (ADMELOG) corrective regimen sliding scale   SubCutaneous at bedtime  insulin lispro Injectable (ADMELOG) 5 Unit(s) SubCutaneous three times a day with meals  isosorbide   dinitrate Tablet (ISORDIL) 5 milliGRAM(s) Oral three times a day  linagliptin 5 milliGRAM(s) Oral daily  milrinone Infusion 0.25 MICROgram(s)/kG/Min (5.41 mL/Hr) IV Continuous <Continuous>  Nephro-rober 1 Tablet(s) Oral daily  pantoprazole  Injectable 40 milliGRAM(s) IV Push daily  polyethylene glycol 3350 17 Gram(s) Oral daily  senna 2 Tablet(s) Oral at bedtime    MEDICATIONS  (PRN):  acetaminophen     Tablet .. 650 milliGRAM(s) Oral every 6 hours PRN Mild Pain (1 - 3)  dextrose Oral Gel 15 Gram(s) Oral once PRN Blood Glucose LESS THAN 70 milliGRAM(s)/deciliter  HYDROmorphone  Injectable 1 milliGRAM(s) IV Push every 6 hours PRN Severe Pain (7 - 10)  melatonin 3 milliGRAM(s) Oral at bedtime PRN Insomnia  ondansetron Injectable 4 milliGRAM(s) IV Push every 6 hours PRN Nausea and/or Vomiting  sodium chloride 0.65% Nasal 1 Spray(s) Both Nostrils three times a day PRN Dryness  sodium chloride 0.9% lock flush 10 milliLiter(s) IV Push every 1 hour PRN Pre/post blood products, medications, blood draw, and to maintain line patency            REVIEW OF SYSTEMS:  Constitutional: [ ] fever, [ ]weight loss,  [x ]fatigue [ ]weight gain  Eyes: [ ] visual changes  Respiratory: [ ]shortness of breath;  [ ] cough, [ ]wheezing, [ ]chills, [ ]hemoptysis  Cardiovascular: [ ] chest pain, [ ]palpitations, [ ]dizziness,  [ ]leg swelling[ ]orthopnea[ ]PND  Gastrointestinal: [ ] abdominal pain, [ ]nausea, [ ]vomiting,  [ ]diarrhea [ ]Constipation [ ]Melena  Genitourinary: [ ] dysuria, [ ] hematuria [ ][place]  Neurologic: [ ] headaches [ ] tremors[ ]weakness [ ]Paralysis Right[ ] Left[ ]  Skin: [ ] itching, [ ]burning, [ ] rashes  Endocrine: [ ] heat or cold intolerance  Musculoskeletal: [ ] joint pain or swelling; [ ] muscle, back, or extremity pain  Psychiatric: [ ] depression, [ ]anxiety, [ ]mood swings, or [ ]difficulty sleeping  Hematologic: [ ] easy bruising, [ ] bleeding gums    [ ] All remaining systems negative except as per above.   [ ]Unable to obtain.  [x] No change in ROS since admission      Vital Signs Last 24 Hrs  T(C): 37.2 (05 Mar 2025 04:37), Max: 37.2 (05 Mar 2025 04:37)  T(F): 99 (05 Mar 2025 04:37), Max: 99 (05 Mar 2025 04:37)  HR: 100 (05 Mar 2025 04:37) (99 - 105)  BP: 105/65 (05 Mar 2025 04:37) (86/47 - 108/56)  BP(mean): 60 (04 Mar 2025 16:12) (60 - 76)  RR: 18 (05 Mar 2025 04:37) (17 - 18)  SpO2: 95% (05 Mar 2025 04:37) (94% - 99%)    Parameters below as of 05 Mar 2025 04:37  Patient On (Oxygen Delivery Method): room air      I&O's Summary    04 Mar 2025 07:01  -  05 Mar 2025 07:00  --------------------------------------------------------  IN: 240 mL / OUT: 1200 mL / NET: -960 mL        PHYSICAL EXAM:  General: No acute distress BMI-28  HEENT: EOMI, PERRL  Neck: Supple, [ ] JVD  Lungs: Equal air entry bilaterally; [ ] rales [ ] wheezing [ ] rhonchi  Heart: Regular rate and rhythm; [x ] murmur   2/6 [ x] systolic [ ] diastolic [ ] radiation[ ] rubs [ ]  gallops  Abdomen: Nontender, bowel sounds present  Extremities: No clubbing, cyanosis, [ ] edema [x ]]Warm, well perfused        RLE: Dressings in place, blistering and ulceration on the dorsal aspect of the foot        LLE: First digit dry gangrene on the plantar aspect  Nervous system:  Alert & Oriented X3, no focal deficits  Psychiatric: Normal affect  Skin: No rashes or lesions    LABS:      130[L]  |  93[L]  |  30[H]  ----------------------------<  106[H]  3.7   |  25  |  2.36[H]    Ca    8.5      04 Mar 2025 05:49  Phos  2.4     03-04  Mg     1.8     -04      Creatinine Trend: 2.36<--, 2.55<--, 2.23<--, 3.65<--, 3.12<--, 3.39<--                        9.4    10.35 )-----------( 219      ( 04 Mar 2025 05:51 )             27.6             TTE Limited W or WO Ultrasound Enhancing Agent (25 @ 12:39) >  CONCLUSIONS:      1. Limited TTE to assess for MR, IVC, LVOT, TR.   2. Left ventricular systolic function is severely decreased.   3. Reduced right ventricular systolic function.   4. LVOT VTI 12.0cm, Stroke volume 35cc. LVOT diameter 1.9cm.   5. Mild to moderate tricuspid regurgitation.   6. Estimated pulmonary artery systolic pressure is 32 mmHg, consistent with normal pulmonary artery pressure.   7. The inferior vena cava is normal in size measuring 1.70 cm in diameter, (normal <2.1cm) with abnormal inspiratory collapse (abnormal <50%) consistent with mildly elevated right atrial pressure (~8, range 5-10mmHg).   8. Compared to the transthoracic echocardiogram performed on 2/10/2025, RV and LV function still .         Xray Chest 1 View AP/PA (25 @ 18:05) >    IMPRESSION:  No focal consolidations.  Mildpulmonary vascular congestion.        Cardiac Catheterization (25 @ 14:24) >  Diagnostic Conclusions:     Right dominant coronary anatomy with severe multivessel disease (high syntax score)  LM   Left main artery: There is a 70 % stenosis in the middle third portion of the segment.    LAD   Left anterior descending artery: Angiography shows severe atherosclerosis. There is a 90 % stenosis in the middle third portion of the segment.      CX   Circumflex: Angiography shows severe atherosclerosis. There is an 80 % stenosis in the ostium portion of the segment.    RCA   Right coronary artery: The distal vessel is supplied by limited collaterals from the Left anterior descending artery. There is a 100 % total occlusion stenosis in the proximal third portion of the segment.      Mild-moderate post capillary pulmonary hypertension, with reduced cardiac output/index  Elevated RA (22mmHg) and PCWP (35mmHg) pressures. Elevated V waves on  PCWP tracing (44mmHg)    MR Cardiac w/wo IV Cont (25 @ 09:44) >  IMPRESSION:.    The left ventricle demonstrates severe wall motion abnormalities and  function is depressed (calculated LVEF is 25%).    There is greater than 75% subendocardial scarring involving the basal to  mid inferior wall of the left ventricle.    There is left ventricular transmural scarring involving the apical septal  wall and likely near transmural scarring of the apical lateral wall.    There is about 50% scarring of the left ventricular basal inferoseptal  wall.

## 2025-03-05 NOTE — PROGRESS NOTE ADULT - ASSESSMENT
65y/o F w/h/o uncontrolled T2DM (A1C 11.6%) on Tresiba and CeQur insulin patch PTA. DM c/b PAD, retinopathy, CKD, CAD. Also h/oType 2 DM, HTN, HLD. Presented to OSH with worsening right leg pain and fluid secretion. Transferred to [facility] for CO2 angiogram. Found to have Low EF to 20% in OSBALDO. Endocrine consulted for Type 2 DM management, uncontrolled. BG Goal 100-180mg/dl   s/p high risk PCI done yesterday " Feel good" Tolerating POs, eats well. Denies eating snacks between meals or overnight. Last 24 hour BGs 146-206 with elevated fasting . Continue to be on Bumex and milrinone gtt       #uncontrolled Type 2 diabetes mellitus   A1C with Estimated Average Glucose Result: 11.6 % (01-24-25 @ 05:40)  A1C with Estimated Average Glucose Result: >15.5 % (12-13-24 @ 06:49)    Home regimen: Tresiba 40 units, CeQur insulin patch about 2-10 units TID with meals              63y/o F w/h/o uncontrolled T2DM (A1C 11.6%) on Tresiba and CeQur insulin patch PTA. DM c/b PAD, retinopathy, CKD, CAD. Also h/oType 2 DM, HTN, HLD. Presented to OSH with worsening right leg pain and fluid secretion. Transferred to [facility] for CO2 angiogram. Found to have Low EF to 20% in OSBALDO. Endocrine consulted for Type 2 DM management, uncontrolled. BG Goal 100-180mg/dl   s/p high risk PCI done yesterday " Feel good" Tolerating POs, eats well. Denies eating snacks between meals or overnight. Last 24 hour BGs 146-206 with elevated fasting . Continue to be on Bumex IV and milrinone gtt, on iHD      #uncontrolled Type 2 diabetes mellitus   A1C with Estimated Average Glucose Result: 11.6 % (01-24-25 @ 05:40)  A1C with Estimated Average Glucose Result: >15.5 % (12-13-24 @ 06:49)    Home regimen: Tresiba 40 units, CeQur insulin patch about 2-10 units TID with meals

## 2025-03-05 NOTE — PROGRESS NOTE ADULT - PROBLEM SELECTOR PLAN 1
-continue milrinone 0.25 for now; follow up SCr after PCI 3/4  -continue to hold spironolactone  -Continue afterload reduction with hydralazine 25mg TID and Isordil 5mg TID  -Continue with bumex 4mg IV daily, monitor UO and watch for renal recovery, SCr appears to be at baseline  -check perfusion markers (LFTs, lactate) daily  -not a candidate for advanced therapies given advanced CKD and PAD  -Appreciate nephrology input -Decrease milrinone 0.125; follow up SCr after PCI 3/4  -continue to hold spironolactone  -Start hydralazine 10mg TID and continue Isordil 5mg TID  -Continue with bumex 4mg IV daily, monitor UO and watch for renal recovery, SCr appears to be at baseline  -check perfusion markers (LFTs, lactate) daily  -not a candidate for advanced therapies given advanced CKD and PAD  -Appreciate nephrology input

## 2025-03-05 NOTE — PROGRESS NOTE ADULT - PROBLEM SELECTOR PLAN 1
-Primarily with exertion & when laying flat. Pt denies SOB at rest.  -Suspect 2nd to fluid overload, underlying CHF  -Keep O>I as tolerated  -VBG 2/14 acceptable  -ABX per primary team for LE wounds  -Keep sats >90% with O2 PRN (currently RA).  -SOB and cough improved  -Incentive spirometry use encouraged.

## 2025-03-05 NOTE — PROGRESS NOTE ADULT - ASSESSMENT
64F, hx of CHF (last TTE on 1/16/25 EF 30-35%, G2DD), DM, diabetic foot ulcer with a recent admission at [facility] for CHF exacerbation 1/14-1/17 p/w worsening R. leg pain and fluid secretion, was meeting sepsis criteria with podiatry having low suspicion for right cellulitis and admitted for continued management of HF exacerbation and needing ischemic eval.     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  -Left leg Arterial Duplex: Slightly tardus parvus waveform of the left popliteal artery is noted, for which underlying disease cannot be excluded. Posterior tibial artery waveform is nonpulsatile. Anterior tibial artery tardus parvus flow is noted.   Transferred to [facility] for CO2 angiogram as per vascular surgery    #  PAD Wound of lower extremity.   ·  Plan: Podiatry following, b/l serous bullae, no concerns for infection  Found to have RLE coolness  -Right Leg Arterial Duplex: Popliteal artery is occluded with negligible flow of right trifurcation arteries.  -Left leg Arterial Duplex: Slightly tardus parvus waveform of the left popliteal artery is noted, for which underlying disease cannot be excluded. Posterior tibial artery waveform is nonpulsatile. Anterior tibial artery tardus parvus flow is noted.  -Started on heparin gtt and dobutamine gtt -Will transfer to [facility] for CO2 angiogram as per vascular surgery as not candidate for CTA due to worsening SCr  -Keep compression dressing  -LE elevation above heart level at rest.  -Transferred to [facility] for CO2 angiogram   - Overall this patient is at   intermediate  risk (for cardiac death, nonfatal myocardial infarction, and nonfatal cardiac arrest perioperatively for this intermediate  risk procedure).   No cardiac contraindications for CO2 vangiogram  There  are  no further recommendation for risk stratifying imaging/stress testing prior to planned surgery  Seen by Podiatry-No acute pod intervention, local wound care only-         PAD with rest ischemia  s/p AT/Popliteal angioplasty on DAPT        # HFrEF (congestive heart failure). Ischemic Cardiomyopathy  ·  Plan: Hx of HF, previous admission in January, on home Lasix 40 qD, Metoprolol Succ 25 qD. Not on Entresto due to insurance issues  Last TTE: LVSF moderately decreased w/ EF 30-35 %. Moderate G2DD. Mild MR  Stress test: small-sized, moderate defect(s) in the apical wall that is predominantly fixed suggestive of an infarction with minimal marcus-infarct ischemia  Ischemic cardiomyopathy  GDMT on hold 2/2 JAMEL    Repeat TTE EF 20% with WMA RAP~~8  Likely Ischemic cardiomyopathy despite NST revealing fixed defectLHC confirming  Multivessel CAD  Remains on Milrinone add Hydrall/isosorbide      RHC: RA 20, PA 49/29 (40), PCWP 35, mVO2 51.1, CO/CI 3.8/2.2, SVR 1095    # CAD  [facility]-RCA , severe ostial LM disease, diffuse disease in LAD and LCx, some L to R collaterals-seen by CTS advise cMR for viability poor target vessels for CABG-?High risk LM/LAD PCI  Had cMR-LVEF is 25%, greater than 75% subendocardial scarring involving the basal to mid inferior wall of the left ventricle, left ventricular transmural scarring involving the apical septal wall and likely near transmural scarring of the apical lateral wall. 50% scarring of the left ventricular basal inferoseptal wall.  Will need High risk PCI vs CABG though less likely 2/2 poor target vessel    PLAN FOR High risk PCI today Dr Fair    Started on Milrinone to assist augmented diuresis in attempt to avoid further renal failure  Has lost Weight 170---> 164 Kg---> 161.1 Kg---> 165 Kg-->151.2---> 155 ---> 158 --> 148  --> 147  Kg      For HD catheter placement to initiate Dialysis  02/21-Started HD-500mL  02/22-HD 1000mL  02/22-For LE CO2 angiogram-No cardiac contraindications to proceeding with procedure  02/24-CO2 angiogram  02/25-s/p RLE angiogram with pop and AT angioplasty on DAPT  02/26-On Milrinone and Bumex tolerating Hyd/Isos Plan for possible High risk PCI-oLM/LAD  02/27 Had HD yesterday for High risk PCI tomorrow  03/01-LVEDP~~25 on [facility] plan for dialisis over weekend to optimize fluid balance for High risk PCI on Monday 03/02-For PCI tomorrow  03/03-For High risk PCI oLM/LAD today  03/04-Had HD 1.5L removed PCI today    03/05-s/p PCI-LM/LAD             LE EDEMA no DVT      # JAMEL  Creatinine Trend:2.55 <--2.23<-- 3.01 <--3.39<--2.65<--2.34<--2.12<--, 1.89<- 3.02<--3.38<--(HD)-- 4.76<--4.07<---3.90<-- 1.17  Has had 3 sessions HD for interrmittent HD to allow contrast based procedures is non oliguric  Had HD 3/2 1800mL removed

## 2025-03-05 NOTE — PROGRESS NOTE ADULT - SUBJECTIVE AND OBJECTIVE BOX
Interventional Cardiology Post Cath Progress Note:                Subjective:   Patient feels well- no current complaints- Denies chest pain, shortness of breath. Denies pain, numbness, tingling or swelling around (groin/wrist) access site    Tele 24hrs:      MEDICATIONS  (STANDING):  aspirin enteric coated 81 milliGRAM(s) Oral daily  atorvastatin 40 milliGRAM(s) Oral at bedtime  benzocaine/menthol Lozenge 1 Lozenge Oral once  bisacodyl 5 milliGRAM(s) Oral every 12 hours  buMETAnide IVPB 4 milliGRAM(s) IV Intermittent daily  chlorhexidine 4% Liquid 1 Application(s) Topical <User Schedule>  clopidogrel Tablet 75 milliGRAM(s) Oral daily  clopidogrel Tablet      dextrose 5%. 1000 milliLiter(s) (100 mL/Hr) IV Continuous <Continuous>  dextrose 5%. 1000 milliLiter(s) (50 mL/Hr) IV Continuous <Continuous>  dextrose 50% Injectable 25 Gram(s) IV Push once  dextrose 50% Injectable 12.5 Gram(s) IV Push once  dextrose 50% Injectable 25 Gram(s) IV Push once  glucagon  Injectable 1 milliGRAM(s) IntraMuscular once  heparin   Injectable 5000 Unit(s) SubCutaneous every 8 hours  insulin glargine Injectable (LANTUS) 12 Unit(s) SubCutaneous at bedtime  insulin lispro (ADMELOG) corrective regimen sliding scale   SubCutaneous at bedtime  insulin lispro (ADMELOG) corrective regimen sliding scale   SubCutaneous three times a day before meals  insulin lispro Injectable (ADMELOG) 5 Unit(s) SubCutaneous three times a day with meals  isosorbide   dinitrate Tablet (ISORDIL) 5 milliGRAM(s) Oral three times a day  linagliptin 5 milliGRAM(s) Oral daily  milrinone Infusion 0.25 MICROgram(s)/kG/Min (5.41 mL/Hr) IV Continuous <Continuous>  Nephro-rober 1 Tablet(s) Oral daily  pantoprazole  Injectable 40 milliGRAM(s) IV Push daily  polyethylene glycol 3350 17 Gram(s) Oral daily  senna 2 Tablet(s) Oral at bedtime    MEDICATIONS  (PRN):  acetaminophen     Tablet .. 650 milliGRAM(s) Oral every 6 hours PRN Mild Pain (1 - 3)  dextrose Oral Gel 15 Gram(s) Oral once PRN Blood Glucose LESS THAN 70 milliGRAM(s)/deciliter  HYDROmorphone  Injectable 1 milliGRAM(s) IV Push every 6 hours PRN Severe Pain (7 - 10)  melatonin 3 milliGRAM(s) Oral at bedtime PRN Insomnia  ondansetron Injectable 4 milliGRAM(s) IV Push every 6 hours PRN Nausea and/or Vomiting  sodium chloride 0.65% Nasal 1 Spray(s) Both Nostrils three times a day PRN Dryness  sodium chloride 0.9% lock flush 10 milliLiter(s) IV Push every 1 hour PRN Pre/post blood products, medications, blood draw, and to maintain line patency      Objective:  Vital Signs Last 24 Hrs  T(C): 37.2 (05 Mar 2025 04:37), Max: 37.2 (05 Mar 2025 04:37)  T(F): 99 (05 Mar 2025 04:37), Max: 99 (05 Mar 2025 04:37)  HR: 100 (05 Mar 2025 04:37) (99 - 105)  BP: 105/65 (05 Mar 2025 04:37) (86/47 - 107/75)  BP(mean): 60 (04 Mar 2025 16:12) (60 - 76)  RR: 18 (05 Mar 2025 04:37) (18 - 18)  SpO2: 95% (05 Mar 2025 04:37) (94% - 99%)    Parameters below as of 05 Mar 2025 04:37  Patient On (Oxygen Delivery Method): room air        25 @ 07:01  -  25 @ 07:00  --------------------------------------------------------  IN: 240 mL / OUT: 1200 mL / NET: -960 mL                              9.4    10.35 )-----------( 219      ( 04 Mar 2025 05:51 )             27.6         130[L]  |  93[L]  |  30[H]  ----------------------------<  106[H]  3.7   |  25  |  2.36[H]    Ca    8.5      04 Mar 2025 05:49  Phos  2.4     03-04  Mg     1.8     03-04        Urinalysis Basic - ( 04 Mar 2025 05:49 )    Color: x / Appearance: x / SG: x / pH: x  Gluc: 106 mg/dL / Ketone: x  / Bili: x / Urobili: x   Blood: x / Protein: x / Nitrite: x   Leuk Esterase: x / RBC: x / WBC x   Sq Epi: x / Non Sq Epi: x / Bacteria: x      Physical Exam:  No apparent distress, alert and oriented times three, appropriate affect  JVD is not elevated, supple  Clear to auscultation with no wheezing, ronchi or crackles  Regular rate and rhythm with no murmur, rub or gallop  Positive bowel sounds, soft, non-tender, non-distended, no masses/guarding or rebound tenderness  (Right groin: Soft, non tender, no bleeding or hematoma, clean/dry/intact- RLE/LLE +2 (palpable femoral pulse/audible by doppler/absent)  No clubbing, cyanosis or edema      < from: TTE Limited W or WO Ultrasound Enhancing Agent (25 @ 12:39) >  CONCLUSIONS:      1. Limited TTE to assess for MR, IVC, LVOT, TR.   2. Left ventricular systolic function is severely decreased.   3. Reduced right ventricular systolic function.   4. LVOT VTI 12.0cm, Stroke volume 35cc. LVOT diameter 1.9cm.   5. Mild to moderate tricuspid regurgitation.   6. Estimated pulmonary artery systolic pressure is 32 mmHg, consistent with normal pulmonary artery pressure.   7. The inferior vena cava is normal in size measuring 1.70 cm in diameter, (normal <2.1cm) with abnormal inspiratory collapse (abnormal <50%) consistent with mildly elevated right atrial pressure (~8, range 5-10mmHg).   8. Compared to the transthoracic echocardiogram performed on 2/10/2025, RV and LV function still .    < end of copied text >  Stress test: small-sized, moderate defect(s) in the apical wall that is predominantly fixed suggestive of an infarction with minimal marcus-infarct ischemia.

## 2025-03-05 NOTE — PROGRESS NOTE ADULT - ASSESSMENT
Assessment/Plan: 64F, hx of CHF (last TTE on 1/16/25 EF 30-35%, G2DD), DM, diabetic foot ulcer with a recent admission at [facility] for CHF exacerbation 1/14-1/17 p/w worsening R. leg pain and fluid secretion, was meeting sepsis criteria with podiatry having low suspicion for right cellulitis and admitted for continued management of HF exacerbation and needing ischemic eval.   3/4    High risk LM/LAD PCI  3/4/25 s/pLHC VERA x 1 to LAD, VERA to LM , POBA to CX via RFA Perclose dsg dry intact   - Groin site stable.   - Continue DAPT (aspirin 81mg and clopidogrel 75mg).  - Continue atorvastatin  - LVSF moderately decreased w/ EF 30-35 %. Moderate G2DD. Mild MR on Milrinone Gtt, Bumex Gtt & Isordil   - Recommend a heart healthy diet which includes a variety of fruits and vegetables, whole grains, low fat dairy products, legumes and skinless poulty and fish; food prepared with little or no salt and minimize processed foods  - Avoid using NSAIDs  (Aleve, Motrin, ibuprofen, naproxen) while on DAPT, please utilize Tylenol for pain control (not to exceed 4gm in 24 hours)  - Care per primary team  - f/u with Dr Yordy Gilmore cards

## 2025-03-05 NOTE — PROGRESS NOTE ADULT - ASSESSMENT
64y Female with history of CHF presents as a transfer for LE CO2 angiogram. Nephrology consulted for elevated Scr.    1) JAMEL: likely due to ATN and CRS for which patient initiated on RRT on 2/20 with last UF on 3/3 in preparation for LHC on 3/4. No need for RRT at this time. Will consider discontinuation of shiley versus replacement on 3/6 pending lab results and UO. On milrinone gtt as per HF. Avoid nephrotoxins.    2) HTN: BP low normal. Isordil as per cardiology. Monitor BP.    3) LE edema: Patient non-oliguric. On bumex 4 mg IV daily. TTE with severely decreased LVSF. Monitor UO.     4) Anemia: Hb improving with low TSAT. No IV iron given elevated ferritin. S/P Epo 8K X 1 dose 3/1. Monitor Hb.      [facility] NEPHROLOGY  Raji Waterman M.D.  Mars Feliz D.O.  Kelley Parks M.D.  MD Nancy Kulkarni, MSN, ANP-C    Telephone: [phone]  Facsimile: [phone]    [address] [place] [address], [address]  [place] [address]   64y Female with history of CHF presents as a transfer for LE CO2 angiogram. Nephrology consulted for elevated Scr.    1) JAMEL: likely due to ATN and CRS for which patient initiated on RRT on 2/20 with last UF on 3/3 in preparation for LHC on 3/4. No need for RRT at this time as Scr stable with good UO despite LHC on 3/4. Will consider discontinuation of shiley versus replacement on 3/6 pending lab results and UO. On milrinone gtt as per HF. Avoid nephrotoxins.    2) HTN: BP low normal. Isordil as per cardiology. Monitor BP.    3) LE edema: Patient non-oliguric. On bumex 4 mg IV daily. TTE with severely decreased LVSF. Monitor UO.     4) Anemia: Hb improving with low TSAT. No IV iron given elevated ferritin. S/P Epo 8K X 1 dose 3/1. Monitor Hb.      [facility] NEPHROLOGY  Raji Waterman M.D.  Mars Feliz D.O.  Kelley Parks M.D.  MD Nancy Kulkarni, MSN, ANP-C    Telephone: [phone]  Facsimile: [phone] [address], [address]  [place] [address]

## 2025-03-05 NOTE — ADVANCED PRACTICE NURSE CONSULT - RECOMMEDATIONS
1. BLE; care as per Vascular and Podiatry  2. sacrum, b/l buttocks: routine pericare with Triad, apply twice daily and prn for stooling  3. continue to encourage mobility, T&P  4. continue with seat cushion.  5. off-load heels with boots or cushion  6. nutrition support as pt condition allows  Tx plan discussed with pt/RN

## 2025-03-05 NOTE — PROGRESS NOTE ADULT - SUBJECTIVE AND OBJECTIVE BOX
Name of Patient : TOMASZ ALBA  MRN: [id number]  Date of visit: 03-05-25       Subjective: Patient seen and examined. No new events except as noted.   doing okay   S/P Cat and stent placement     REVIEW OF SYSTEMS:    CONSTITUTIONAL: No weakness, fevers or chills  EYES/ENT: No visual changes;  No vertigo or throat pain   NECK: No pain or stiffness  RESPIRATORY: No cough, wheezing, hemoptysis; No shortness of breath  CARDIOVASCULAR: No chest pain or palpitations  GASTROINTESTINAL: No abdominal or epigastric pain. No nausea, vomiting, or hematemesis; No diarrhea or constipation. No melena or hematochezia.  GENITOURINARY: No dysuria, frequency or hematuria  NEUROLOGICAL: No numbness or weakness  SKIN: No itching, burning, rashes, or lesions   All other review of systems is negative unless indicated above.    MEDICATIONS:  MEDICATIONS  (STANDING):  aspirin enteric coated 81 milliGRAM(s) Oral daily  atorvastatin 40 milliGRAM(s) Oral at bedtime  benzocaine/menthol Lozenge 1 Lozenge Oral once  bisacodyl 5 milliGRAM(s) Oral every 12 hours  buMETAnide IVPB 4 milliGRAM(s) IV Intermittent daily  chlorhexidine 4% Liquid 1 Application(s) Topical <User Schedule>  clopidogrel Tablet 75 milliGRAM(s) Oral daily  clopidogrel Tablet      dextrose 5%. 1000 milliLiter(s) (100 mL/Hr) IV Continuous <Continuous>  dextrose 5%. 1000 milliLiter(s) (50 mL/Hr) IV Continuous <Continuous>  dextrose 50% Injectable 25 Gram(s) IV Push once  dextrose 50% Injectable 12.5 Gram(s) IV Push once  dextrose 50% Injectable 25 Gram(s) IV Push once  glucagon  Injectable 1 milliGRAM(s) IntraMuscular once  heparin   Injectable 5000 Unit(s) SubCutaneous every 8 hours  HYDROmorphone  Injectable 0.5 milliGRAM(s) IV Push once  insulin glargine Injectable (LANTUS) 13 Unit(s) SubCutaneous at bedtime  insulin lispro (ADMELOG) corrective regimen sliding scale   SubCutaneous three times a day before meals  insulin lispro (ADMELOG) corrective regimen sliding scale   SubCutaneous at bedtime  insulin lispro Injectable (ADMELOG) 5 Unit(s) SubCutaneous three times a day with meals  isosorbide   dinitrate Tablet (ISORDIL) 5 milliGRAM(s) Oral three times a day  milrinone Infusion 0.25 MICROgram(s)/kG/Min (5.41 mL/Hr) IV Continuous <Continuous>  Nephro-rober 1 Tablet(s) Oral daily  pantoprazole  Injectable 40 milliGRAM(s) IV Push daily  polyethylene glycol 3350 17 Gram(s) Oral daily  senna 2 Tablet(s) Oral at bedtime      PHYSICAL EXAM:  T(C): 37.2 (03-05-25 @ 20:18), Max: 37.2 (03-05-25 @ 04:37)  HR: 116 (03-05-25 @ 20:18) (100 - 125)  BP: 96/61 (03-05-25 @ 20:18) (93/55 - 106/68)  RR: 18 (03-05-25 @ 20:18) (17 - 18)  SpO2: 95% (03-05-25 @ 20:18) (95% - 96%)  Wt(kg): --  I&O's Summary    04 Mar 2025 07:01  -  05 Mar 2025 07:00  --------------------------------------------------------  IN: 240 mL / OUT: 1200 mL / NET: -960 mL    05 Mar 2025 07:01  -  05 Mar 2025 22:31  --------------------------------------------------------  IN: 120 mL / OUT: 100 mL / NET: 20 mL          Appearance: Normal	  HEENT:  PERRLA   Lymphatic: No lymphadenopathy   Cardiovascular: Normal S1 S2, no JVD  Respiratory: normal effort , clear  Gastrointestinal:  Soft, Non-tender  Skin: No rashes,  warm to touch  Psychiatry:  Mood & affect appropriate  Musculuskeletal: No edema    recent labs, Imaging and EKGs personally reviewed     03-04-25 @ 07:01  -  03-05-25 @ 07:00  --------------------------------------------------------  IN: 240 mL / OUT: 1200 mL / NET: -960 mL    03-05-25 @ 07:01  -  03-05-25 @ 22:31  --------------------------------------------------------  IN: 120 mL / OUT: 100 mL / NET: 20 mL                            9.5    10.51 )-----------( 198      ( 05 Mar 2025 08:39 )             27.9               03-05    128[L]  |  88[L]  |  35[H]  ----------------------------<  189[H]  3.6   |  25  |  2.35[H]    Ca    8.5      05 Mar 2025 08:43  Phos  2.4     03-04  Mg     1.7     03-05                         Urinalysis Basic - ( 05 Mar 2025 08:43 )    Color: x / Appearance: x / SG: x / pH: x  Gluc: 189 mg/dL / Ketone: x  / Bili: x / Urobili: x   Blood: x / Protein: x / Nitrite: x   Leuk Esterase: x / RBC: x / WBC x   Sq Epi: x / Non Sq Epi: x / Bacteria: x

## 2025-03-05 NOTE — PROGRESS NOTE ADULT - SUBJECTIVE AND OBJECTIVE BOX
Subjective:    Medications:  acetaminophen     Tablet .. 650 milliGRAM(s) Oral every 6 hours PRN  aspirin enteric coated 81 milliGRAM(s) Oral daily  atorvastatin 40 milliGRAM(s) Oral at bedtime  benzocaine/menthol Lozenge 1 Lozenge Oral once  bisacodyl 5 milliGRAM(s) Oral every 12 hours  buMETAnide IVPB 4 milliGRAM(s) IV Intermittent daily  chlorhexidine 4% Liquid 1 Application(s) Topical <User Schedule>  clopidogrel Tablet 75 milliGRAM(s) Oral daily  clopidogrel Tablet      dextrose 5%. 1000 milliLiter(s) IV Continuous <Continuous>  dextrose 5%. 1000 milliLiter(s) IV Continuous <Continuous>  dextrose 50% Injectable 25 Gram(s) IV Push once  dextrose 50% Injectable 12.5 Gram(s) IV Push once  dextrose 50% Injectable 25 Gram(s) IV Push once  dextrose Oral Gel 15 Gram(s) Oral once PRN  glucagon  Injectable 1 milliGRAM(s) IntraMuscular once  heparin   Injectable 5000 Unit(s) SubCutaneous every 8 hours  HYDROmorphone  Injectable 1 milliGRAM(s) IV Push every 6 hours PRN  insulin glargine Injectable (LANTUS) 12 Unit(s) SubCutaneous at bedtime  insulin lispro (ADMELOG) corrective regimen sliding scale   SubCutaneous three times a day before meals  insulin lispro (ADMELOG) corrective regimen sliding scale   SubCutaneous at bedtime  insulin lispro Injectable (ADMELOG) 5 Unit(s) SubCutaneous three times a day with meals  isosorbide   dinitrate Tablet (ISORDIL) 5 milliGRAM(s) Oral three times a day  linagliptin 5 milliGRAM(s) Oral daily  melatonin 3 milliGRAM(s) Oral at bedtime PRN  milrinone Infusion 0.25 MICROgram(s)/kG/Min IV Continuous <Continuous>  Nephro-rober 1 Tablet(s) Oral daily  ondansetron Injectable 4 milliGRAM(s) IV Push every 6 hours PRN  pantoprazole  Injectable 40 milliGRAM(s) IV Push daily  polyethylene glycol 3350 17 Gram(s) Oral daily  senna 2 Tablet(s) Oral at bedtime  sodium chloride 0.65% Nasal 1 Spray(s) Both Nostrils three times a day PRN  sodium chloride 0.9% lock flush 10 milliLiter(s) IV Push every 1 hour PRN      Physical Exam:    Vitals:  Vital Signs Last 24 Hours  T(C): 37.2 (03-05-25 @ 04:37), Max: 37.2 (03-05-25 @ 04:37)  HR: 100 (03-05-25 @ 04:37) (99 - 105)  BP: 105/65 (03-05-25 @ 04:37) (86/47 - 107/75)  RR: 18 (03-05-25 @ 04:37) (18 - 18)  SpO2: 95% (03-05-25 @ 04:37) (94% - 99%)    I&O's Summary    04 Mar 2025 07:01  -  05 Mar 2025 07:00  --------------------------------------------------------  IN: 240 mL / OUT: 1200 mL / NET: -960 mL      General: No distress. Comfortable.  HEENT: EOM intact.  Neck: Neck supple. JVP not elevated. No masses  Chest: Clear to auscultation bilaterally  CV: Normal S1 and S2. No murmurs, rub, or gallops. Radial pulses normal.  Abdomen: Soft, non-distended, non-tender  Skin: No rashes or skin breakdown  Neurology: Alert and oriented times three. Sensation intact  Psych: Affect normal    Labs:                        9.4    10.35 )-----------( 219      ( 04 Mar 2025 05:51 )             27.6     03-04    130[L]  |  93[L]  |  30[H]  ----------------------------<  106[H]  3.7   |  25  |  2.36[H]    Ca    8.5      04 Mar 2025 05:49  Phos  2.4     03-04  Mg     1.8     03-04                     Subjective: Patient doing well. S/p PCI with VERA to LM, LAD and POBA to RCA on 3/4. Doing well. Denies CP, SOB, GARG, palpitations. Ongoing LE edema. Otherwise doing well. Remains on milrinone 0.25. Bumex 4mg IV daily. Renal function appears to be at baseline.     Medications:  acetaminophen     Tablet .. 650 milliGRAM(s) Oral every 6 hours PRN  aspirin enteric coated 81 milliGRAM(s) Oral daily  atorvastatin 40 milliGRAM(s) Oral at bedtime  benzocaine/menthol Lozenge 1 Lozenge Oral once  bisacodyl 5 milliGRAM(s) Oral every 12 hours  buMETAnide IVPB 4 milliGRAM(s) IV Intermittent daily  chlorhexidine 4% Liquid 1 Application(s) Topical <User Schedule>  clopidogrel Tablet 75 milliGRAM(s) Oral daily  clopidogrel Tablet      dextrose 5%. 1000 milliLiter(s) IV Continuous <Continuous>  dextrose 5%. 1000 milliLiter(s) IV Continuous <Continuous>  dextrose 50% Injectable 25 Gram(s) IV Push once  dextrose 50% Injectable 12.5 Gram(s) IV Push once  dextrose 50% Injectable 25 Gram(s) IV Push once  dextrose Oral Gel 15 Gram(s) Oral once PRN  glucagon  Injectable 1 milliGRAM(s) IntraMuscular once  heparin   Injectable 5000 Unit(s) SubCutaneous every 8 hours  HYDROmorphone  Injectable 1 milliGRAM(s) IV Push every 6 hours PRN  insulin glargine Injectable (LANTUS) 12 Unit(s) SubCutaneous at bedtime  insulin lispro (ADMELOG) corrective regimen sliding scale   SubCutaneous three times a day before meals  insulin lispro (ADMELOG) corrective regimen sliding scale   SubCutaneous at bedtime  insulin lispro Injectable (ADMELOG) 5 Unit(s) SubCutaneous three times a day with meals  isosorbide   dinitrate Tablet (ISORDIL) 5 milliGRAM(s) Oral three times a day  linagliptin 5 milliGRAM(s) Oral daily  melatonin 3 milliGRAM(s) Oral at bedtime PRN  milrinone Infusion 0.25 MICROgram(s)/kG/Min IV Continuous <Continuous>  Nephro-rober 1 Tablet(s) Oral daily  ondansetron Injectable 4 milliGRAM(s) IV Push every 6 hours PRN  pantoprazole  Injectable 40 milliGRAM(s) IV Push daily  polyethylene glycol 3350 17 Gram(s) Oral daily  senna 2 Tablet(s) Oral at bedtime  sodium chloride 0.65% Nasal 1 Spray(s) Both Nostrils three times a day PRN  sodium chloride 0.9% lock flush 10 milliLiter(s) IV Push every 1 hour PRN    Physical Exam:    Vitals:  Vital Signs Last 24 Hours  T(C): 37.2 (03-05-25 @ 04:37), Max: 37.2 (03-05-25 @ 04:37)  HR: 100 (03-05-25 @ 04:37) (99 - 105)  BP: 105/65 (03-05-25 @ 04:37) (86/47 - 107/75)  RR: 18 (03-05-25 @ 04:37) (18 - 18)  SpO2: 95% (03-05-25 @ 04:37) (94% - 99%)    I&O's Summary    04 Mar 2025 07:01  -  05 Mar 2025 07:00  --------------------------------------------------------  IN: 240 mL / OUT: 1200 mL / NET: -960 mL    General: No distress. Comfortable.  HEENT: EOM intact.  Neck: Neck supple. JVP not elevated. No masses  Chest: Clear to auscultation bilaterally  CV: Normal S1 and S2. No murmurs, rub, or gallops. Radial pulses normal.  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  Neurology: Alert and oriented times three. Sensation intact  Psych: Affect normal    Labs:                        9.4    10.35 )-----------( 219      ( 04 Mar 2025 05:51 )             27.6     03-04    130[L]  |  93[L]  |  30[H]  ----------------------------<  106[H]  3.7   |  25  |  2.36[H]    Ca    8.5      04 Mar 2025 05:49  Phos  2.4     03-04  Mg     1.8     03-04       Subjective: S/p PCI with VERA to LM, LAD and POBA to LCx on 3/4. Doing well. Denies CP, SOB, GARG, palpitations. Ongoing LE edema. Otherwise no acute complaints this morning. Remains on milrinone 0.25. Bumex 4mg IV daily. Renal function appears to be at baseline.     Medications:  acetaminophen     Tablet .. 650 milliGRAM(s) Oral every 6 hours PRN  aspirin enteric coated 81 milliGRAM(s) Oral daily  atorvastatin 40 milliGRAM(s) Oral at bedtime  benzocaine/menthol Lozenge 1 Lozenge Oral once  bisacodyl 5 milliGRAM(s) Oral every 12 hours  buMETAnide IVPB 4 milliGRAM(s) IV Intermittent daily  chlorhexidine 4% Liquid 1 Application(s) Topical <User Schedule>  clopidogrel Tablet 75 milliGRAM(s) Oral daily  clopidogrel Tablet      dextrose 5%. 1000 milliLiter(s) IV Continuous <Continuous>  dextrose 5%. 1000 milliLiter(s) IV Continuous <Continuous>  dextrose 50% Injectable 25 Gram(s) IV Push once  dextrose 50% Injectable 12.5 Gram(s) IV Push once  dextrose 50% Injectable 25 Gram(s) IV Push once  dextrose Oral Gel 15 Gram(s) Oral once PRN  glucagon  Injectable 1 milliGRAM(s) IntraMuscular once  heparin   Injectable 5000 Unit(s) SubCutaneous every 8 hours  HYDROmorphone  Injectable 1 milliGRAM(s) IV Push every 6 hours PRN  insulin glargine Injectable (LANTUS) 12 Unit(s) SubCutaneous at bedtime  insulin lispro (ADMELOG) corrective regimen sliding scale   SubCutaneous three times a day before meals  insulin lispro (ADMELOG) corrective regimen sliding scale   SubCutaneous at bedtime  insulin lispro Injectable (ADMELOG) 5 Unit(s) SubCutaneous three times a day with meals  isosorbide   dinitrate Tablet (ISORDIL) 5 milliGRAM(s) Oral three times a day  linagliptin 5 milliGRAM(s) Oral daily  melatonin 3 milliGRAM(s) Oral at bedtime PRN  milrinone Infusion 0.25 MICROgram(s)/kG/Min IV Continuous <Continuous>  Nephro-rober 1 Tablet(s) Oral daily  ondansetron Injectable 4 milliGRAM(s) IV Push every 6 hours PRN  pantoprazole  Injectable 40 milliGRAM(s) IV Push daily  polyethylene glycol 3350 17 Gram(s) Oral daily  senna 2 Tablet(s) Oral at bedtime  sodium chloride 0.65% Nasal 1 Spray(s) Both Nostrils three times a day PRN  sodium chloride 0.9% lock flush 10 milliLiter(s) IV Push every 1 hour PRN    Physical Exam:    Vitals:  Vital Signs Last 24 Hours  T(C): 37.2 (03-05-25 @ 04:37), Max: 37.2 (03-05-25 @ 04:37)  HR: 100 (03-05-25 @ 04:37) (99 - 105)  BP: 105/65 (03-05-25 @ 04:37) (86/47 - 107/75)  RR: 18 (03-05-25 @ 04:37) (18 - 18)  SpO2: 95% (03-05-25 @ 04:37) (94% - 99%)    I&O's Summary    04 Mar 2025 07:01  -  05 Mar 2025 07:00  --------------------------------------------------------  IN: 240 mL / OUT: 1200 mL / NET: -960 mL    General: No distress. Comfortable.  HEENT: EOM intact.  Neck: Neck supple. JVP not elevated. No masses  Chest: Clear to auscultation bilaterally  CV: Normal S1 and S2. No murmurs, rub, or gallops. Radial pulses normal.  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  Neurology: Alert and oriented times three. Sensation intact  Psych: Affect normal    Labs:                        9.4    10.35 )-----------( 219      ( 04 Mar 2025 05:51 )             27.6     03-04    130[L]  |  93[L]  |  30[H]  ----------------------------<  106[H]  3.7   |  25  |  2.36[H]    Ca    8.5      04 Mar 2025 05:49  Phos  2.4     03-04  Mg     1.8     03-04

## 2025-03-05 NOTE — ADVANCED PRACTICE NURSE CONSULT - ASSESSMENT
the pt was encountered on 2DSU- Mrs Kirby was awake and alert, engaging in conversation. she is in a low air-loss surface and can assist with turning herself in the bed. She was seen by nutrition and they are following.  The pt has a stiles to divert urine from the skin. there is a seat cushion at bedside to off-load the sacrum when sitting.  pt has ace wraps to her BLE- the wounds on the legs are being followed by Vascular and Podiatry.  upon assessment, the pt was incontinent of a small amount of stool and pericare was provided.   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  education was provided to the pt re: skin condition , tx plan and PI prevention

## 2025-03-05 NOTE — PROGRESS NOTE ADULT - ASSESSMENT
63 YO F with PMHx of HFrEF 30-35 and grade 2 ddfxn (last TTE on 01/16/25), DM2, and diabetic foot ulcer. Recent admission at [facility] for ADHF. Patient now represents to OSH and ultimately to [facility] as a transfer for worsening right leg pain and fluid secretion. As per documentation, patient was reported to have severe depletion of RLE blood flow beyond the popliteal vessel at knee and similar findings in the left. Patient was transferred to [facility] for CO2 angiogram with Dr. Baltazar. Of note, patient also with reported visual disturbance for which patient was seen and evaluated by opthalmology. Internal Medicine has been consulted on Ms. Kirby's care for medical management.     # ADHF/ BiV HFrEF 20   - Recent admission at [facility] for ADHF and on dobutamine outpatient  - TTE 1/16 with EF 30-35 with moderately reduced LVSF and grade 2 ddfxn, normal RVSF , trace TR/ NJ, and trace pericardial effusion  - NST abnormal with small-sized, moderate defect in apical wall that is predominantly fixed suggestive of an infarction with minimal marcus-infarct ischemia. Post stress LVEF 25.  - CT Chest with small BL pleural effusions and small pericardial effusion.  - RPT TTE with EF 20, severely decreased LV, decreased RVSF with TAPSE 1.2, and regional wall motion abnormalities present with entire septum, entire apex, entire inferior wall, mid inferolateral segment, and mid anterolateral segment are hypokinetic.   - RHC with RA 20, PA 49/29 (40), PCWP 35, mVO2 51.1, CO/CI 3.8/2.2, SVR 1095 w/ Multivessel CAD   - s/p zaroxyln 10 QD  - CRE and sodium rising and bumex GTT stopped 2/18 and HTS stopped 2/16   - RPT limited TTE w/ LVSF severely decreased, reduced RVSF, LVOT VTI 12, mild to mod TR, and mildly elevated right atrial pressure  - Continues on milrinone gtt as per Cardio   - Continue on hydral 25 and isordil 5  - HF and cards following and adjusting medications   - Case discussed with EP and needs to be on GDMT for 3 months before AICD placement and should be stabilized off of inotropic support.   - Monitor I and O, diuresis per Cardio/ Renal    - GDMT as per Cardio -> on Hydralazine, Isosorbide   - Monitor volume status   - Check daily weights    - Monitor on telemetry; Monitor electrolytes   - Cardio, HF, Renal, and EP evals appreciated; F/u recs   - Planned for high risk PCI    # CAD with TVD   - [facility] with RCA , severe ostial LM disease, diffuse disease in LAD and LCx, some L to R collaterals (Multivessel CAD)  - Case discussed with CTSx and NOT a candidate for CABG due to comorbidities  - Cardiac MRI with greater than 75% subendocardial scarring of the basal to mid-inferior wall of the LV and 50% scarring of the left ventricular basal inferoseptal wall. There is also left ventricular transmural scarring involving the apical septal wall and likely near transmural scarring of the apical lateral wall.  - S/P RPT [facility] with high risk PCI. follow up card recs   - DAPT and Statin       # BL LE Edema likely in setting of ADHF  - Diuresis as per Cardio, HF and Renal   - BL LE elevation and compression  - LE Duplex neg   - Monitor for now  - Podiatry and Vascular evals appreciated; F/u recs -->S/P angio w/ angioplasty with vascular, on DAPT     # Hyponatremia  - F/u labs post HD. Likely 2/2 volume overload    - Monitor Na level; void overcorrection > 6-8 mEq in 24 hours  - Renal eval appreciated; F/u recs    # Pericardial effusion likely in setting ADHF  - TTE with trace pericardial effusion   - CT Chest with small pericardial effusion   - Denies chest pain, palpitations, chest tightness or discomfort, shortness of breath or dyspnea   - Repeat TTE in 1 month for further evaluation   - Diuresis per cardio/ renal.  - Monitor on telemetry  - Cardio following    # Pleural effusion likely in setting ADHF  - CT Chest with small BL pleural effusions  - Monitor O2 saturation  - Supplement to maintain > 90%   - Diuresis per cardio/ renal.     # JAMEL with Hyponatremia and Metabolic Acidosis   - Baseline CRE 0.7-1.2 and increased to ~4  - As per OSH documentation, JAMEL was thought to be second to Unasyn/ Bumex / Entresto use and Hypotension   - US RENAL with no hydronephrosis  - Likely cardiorenal induced with low flow state in ADHF, however now rising again and concern for milrinone vs overdiuresis (prerenal) vs cardiorenal.   - Milrinone adjusted and diuresis turned off, however no improvement/ worsened in renal function noted.   - s/p shiley placement and started on HD 2/20  - c/w HD per renal   - HF and renal following   - Avoid nephrotoxic agents  - Monitor Cr and daily BMP     # Transaminitis  - Likely 2/2 hepatic congestion   - Volume removal with HD as tolerated  - Trend LFTs, if uptrend post-HD initiation, check ABD US  - Serial ABD Examinations    # Hypernatremia   - Hypernatremia likely from HTS vs over diuresis/ intravascular dehydration   - Monitor sodium     # Leukocytosis likely in setting of BL LE ulcers with pop occlusion   - BCx negative   - UCx with probable contamination   - S/P unasyn for ABX.   - Leukocytosis fluctuating, however appears nontoxic with no fever spikes and monitoring closely on below unasyn.   - If febrile check pan cultures   - Trend CBC, temp curve, VS and adjust as tolerated    # Foot ulcers with worsening RLE pain second to pop artery occlusion +/- diabetic ulcers   - RLE Arterial Duplex with popliteal artery occlusion   - LLE Arterial Duplex with underlying disease cannot be excluded   - s/p angio with pop and AT VERA on 2/24   - Continue on Lipitor  - Continue on DAPT  - Continue pain control with oxy  - Wound care called for dressing recs   - Vascular following,     # Tachycardia likely pain related   - On Toprol and improved  - Continue to monitor on tele   - Cardio eval appreciated    # Visual Disturbance  - CT Head w/ old infarcts  - On ASA and Statin   - Opthalmology eval appreciated    # Indigestion and Constipation   - PPI, miralax and senna continued  - Monitor BMs    # Anemia likely mixed AOCD vs iron deficiency   - HH stable in 9s on HSQ  - Anemia panel with AOCD   - Started on venofer, but ferritin high and now stopped   - Continue on EPO with HD  - Monitor HH   - Transfuse for Hgb < 8  - Maintain active T/S    # Diabetes Mellitus A1C 11.6   - Continue on lantus 10 with tradjecta 5 and ISS   - Diabetic DASH diet   - Monitor and adjust glucose levels PRN   - Endocrine following; F/u recs     # HLD and HLD  - Continue on lipitor and toprol  - Monitor BP    # DISPO TBD  - Palliative care called and patient remains FULL CODE

## 2025-03-06 LAB
ANION GAP SERPL CALC-SCNC: 16 MMOL/L — SIGNIFICANT CHANGE UP (ref 5–17)
BUN SERPL-MCNC: 45 MG/DL — HIGH (ref 7–23)
CALCIUM SERPL-MCNC: 8.8 MG/DL — SIGNIFICANT CHANGE UP (ref 8.4–10.5)
CHLORIDE SERPL-SCNC: 89 MMOL/L — LOW (ref 96–108)
CO2 SERPL-SCNC: 22 MMOL/L — SIGNIFICANT CHANGE UP (ref 22–31)
CREAT SERPL-MCNC: 3.63 MG/DL — HIGH (ref 0.5–1.3)
EGFR: 13 ML/MIN/1.73M2 — LOW
EGFR: 13 ML/MIN/1.73M2 — LOW
GLUCOSE BLDC GLUCOMTR-MCNC: 102 MG/DL — HIGH (ref 70–99)
GLUCOSE BLDC GLUCOMTR-MCNC: 130 MG/DL — HIGH (ref 70–99)
GLUCOSE BLDC GLUCOMTR-MCNC: 174 MG/DL — HIGH (ref 70–99)
GLUCOSE BLDC GLUCOMTR-MCNC: 178 MG/DL — HIGH (ref 70–99)
GLUCOSE SERPL-MCNC: 140 MG/DL — HIGH (ref 70–99)
MAGNESIUM SERPL-MCNC: 1.8 MG/DL — SIGNIFICANT CHANGE UP (ref 1.6–2.6)
POTASSIUM SERPL-MCNC: 3.7 MMOL/L — SIGNIFICANT CHANGE UP (ref 3.5–5.3)
POTASSIUM SERPL-SCNC: 3.7 MMOL/L — SIGNIFICANT CHANGE UP (ref 3.5–5.3)
SODIUM SERPL-SCNC: 127 MMOL/L — LOW (ref 135–145)

## 2025-03-06 PROCEDURE — 99232 SBSQ HOSP IP/OBS MODERATE 35: CPT

## 2025-03-06 RX ORDER — MILRINONE LACTATE 1 MG/ML
0.12 INJECTION, SOLUTION INTRAVENOUS
Qty: 20 | Refills: 0 | Status: DISCONTINUED | OUTPATIENT
Start: 2025-03-06 | End: 2025-03-09

## 2025-03-06 RX ORDER — MAGNESIUM SULFATE 500 MG/ML
1 SYRINGE (ML) INJECTION ONCE
Refills: 0 | Status: COMPLETED | OUTPATIENT
Start: 2025-03-06 | End: 2025-03-06

## 2025-03-06 RX ADMIN — Medication 40 MILLIEQUIVALENT(S): at 08:22

## 2025-03-06 RX ADMIN — Medication 1 MILLIGRAM(S): at 06:37

## 2025-03-06 RX ADMIN — MILRINONE LACTATE 5.41 MICROGRAM(S)/KG/MIN: 1 INJECTION, SOLUTION INTRAVENOUS at 05:41

## 2025-03-06 RX ADMIN — CLOPIDOGREL BISULFATE 75 MILLIGRAM(S): 75 TABLET, FILM COATED ORAL at 12:17

## 2025-03-06 RX ADMIN — Medication 1 TABLET(S): at 12:16

## 2025-03-06 RX ADMIN — BUMETANIDE 132 MILLIGRAM(S): 1 TABLET ORAL at 04:57

## 2025-03-06 RX ADMIN — INSULIN GLARGINE-YFGN 13 UNIT(S): 100 INJECTION, SOLUTION SUBCUTANEOUS at 21:46

## 2025-03-06 RX ADMIN — INSULIN LISPRO 1: 100 INJECTION, SOLUTION INTRAVENOUS; SUBCUTANEOUS at 12:14

## 2025-03-06 RX ADMIN — Medication 0.5 MILLIGRAM(S): at 00:24

## 2025-03-06 RX ADMIN — INSULIN LISPRO 1: 100 INJECTION, SOLUTION INTRAVENOUS; SUBCUTANEOUS at 08:29

## 2025-03-06 RX ADMIN — ATORVASTATIN CALCIUM 40 MILLIGRAM(S): 80 TABLET, FILM COATED ORAL at 20:38

## 2025-03-06 RX ADMIN — Medication 40 MILLIGRAM(S): at 12:16

## 2025-03-06 RX ADMIN — HEPARIN SODIUM 5000 UNIT(S): 1000 INJECTION INTRAVENOUS; SUBCUTANEOUS at 04:56

## 2025-03-06 RX ADMIN — INSULIN LISPRO 5 UNIT(S): 100 INJECTION, SOLUTION INTRAVENOUS; SUBCUTANEOUS at 08:29

## 2025-03-06 RX ADMIN — MILRINONE LACTATE 2.7 MICROGRAM(S)/KG/MIN: 1 INJECTION, SOLUTION INTRAVENOUS at 08:18

## 2025-03-06 RX ADMIN — Medication 1 APPLICATION(S): at 08:23

## 2025-03-06 RX ADMIN — Medication 3 MILLIGRAM(S): at 20:38

## 2025-03-06 RX ADMIN — Medication 100 GRAM(S): at 08:22

## 2025-03-06 RX ADMIN — Medication 2 TABLET(S): at 20:38

## 2025-03-06 RX ADMIN — Medication 1 MILLIGRAM(S): at 23:12

## 2025-03-06 RX ADMIN — HEPARIN SODIUM 5000 UNIT(S): 1000 INJECTION INTRAVENOUS; SUBCUTANEOUS at 14:51

## 2025-03-06 RX ADMIN — Medication 1 MILLIGRAM(S): at 20:36

## 2025-03-06 RX ADMIN — HEPARIN SODIUM 5000 UNIT(S): 1000 INJECTION INTRAVENOUS; SUBCUTANEOUS at 20:38

## 2025-03-06 RX ADMIN — Medication 81 MILLIGRAM(S): at 12:16

## 2025-03-06 RX ADMIN — INSULIN LISPRO 5 UNIT(S): 100 INJECTION, SOLUTION INTRAVENOUS; SUBCUTANEOUS at 12:15

## 2025-03-06 RX ADMIN — INSULIN LISPRO 5 UNIT(S): 100 INJECTION, SOLUTION INTRAVENOUS; SUBCUTANEOUS at 16:24

## 2025-03-06 NOTE — PROGRESS NOTE ADULT - ASSESSMENT
64F, hx of CHF (last TTE on 1/16/25 EF 30-35%, G2DD), DM, diabetic foot ulcer with a recent admission at [facility] for CHF exacerbation 1/14-1/17 p/w worsening R. leg pain and fluid secretion, was meeting sepsis criteria with podiatry having low suspicion for right cellulitis and admitted for continued management of HF exacerbation and needing ischemic eval.     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  -Left leg Arterial Duplex: Slightly tardus parvus waveform of the left popliteal artery is noted, for which underlying disease cannot be excluded. Posterior tibial artery waveform is nonpulsatile. Anterior tibial artery tardus parvus flow is noted.   Transferred to [facility] for CO2 angiogram as per vascular surgery    #  PAD Wound of lower extremity.   ·  Plan: Podiatry following, b/l serous bullae, no concerns for infection  Found to have RLE coolness  -Right Leg Arterial Duplex: Popliteal artery is occluded with negligible flow of right trifurcation arteries.  -Left leg Arterial Duplex: Slightly tardus parvus waveform of the left popliteal artery is noted, for which underlying disease cannot be excluded. Posterior tibial artery waveform is nonpulsatile. Anterior tibial artery tardus parvus flow is noted.  -Started on heparin gtt and dobutamine gtt -Will transfer to [facility] for CO2 angiogram as per vascular surgery as not candidate for CTA due to worsening SCr  -Keep compression dressing  -LE elevation above heart level at rest.  -Transferred to [facility] for CO2 angiogram   - Overall this patient is at   intermediate  risk (for cardiac death, nonfatal myocardial infarction, and nonfatal cardiac arrest perioperatively for this intermediate  risk procedure).   No cardiac contraindications for CO2 vangiogram  There  are  no further recommendation for risk stratifying imaging/stress testing prior to planned surgery  Seen by Podiatry-No acute pod intervention, local wound care only-         PAD with rest ischemia  s/p AT/Popliteal angioplasty on DAPT        # HFrEF (congestive heart failure). Ischemic Cardiomyopathy  ·  Plan: Hx of HF, previous admission in January, on home Lasix 40 qD, Metoprolol Succ 25 qD. Not on Entresto due to insurance issues  Last TTE: LVSF moderately decreased w/ EF 30-35 %. Moderate G2DD. Mild MR  Stress test: small-sized, moderate defect(s) in the apical wall that is predominantly fixed suggestive of an infarction with minimal marcus-infarct ischemia  Ischemic cardiomyopathy  GDMT on hold 2/2 JAMEL    Repeat TTE EF 20% with WMA RAP~~8  Likely Ischemic cardiomyopathy despite NST revealing fixed defectLHC confirming  Multivessel CAD  Remains on Milrinone add Hydrall/isosorbide      RHC: RA 20, PA 49/29 (40), PCWP 35, mVO2 51.1, CO/CI 3.8/2.2, SVR 1095    # CAD  [facility]-RCA , severe ostial LM disease, diffuse disease in LAD and LCx, some L to R collaterals-seen by CTS advise cMR for viability poor target vessels for CABG-?High risk LM/LAD PCI  Had cMR-LVEF is 25%, greater than 75% subendocardial scarring involving the basal to mid inferior wall of the left ventricle, left ventricular transmural scarring involving the apical septal wall and likely near transmural scarring of the apical lateral wall. 50% scarring of the left ventricular basal inferoseptal wall.  Will need High risk PCI vs CABG though less likely 2/2 poor target vessel    PLAN FOR High risk PCI today Dr Fair    Started on Milrinone to assist augmented diuresis in attempt to avoid further renal failure  Has lost Weight 170---> 164 Kg---> 161.1 Kg---> 165 Kg-->151.2---> 155 ---> 158 --> 148  --> 147  146 Kg      For HD catheter placement to initiate Dialysis  02/21-Started HD-500mL  02/22-HD 1000mL  02/22-For LE CO2 angiogram-No cardiac contraindications to proceeding with procedure  02/24-CO2 angiogram  02/25-s/p RLE angiogram with pop and AT angioplasty on DAPT  02/26-On Milrinone and Bumex tolerating Hyd/Isos Plan for possible High risk PCI-oLM/LAD    03/06-Taper off [id number] Had HD yesterday for High risk PCI tomorrow  03/01-LVEDP~~25 on [facility] plan for dialisis over weekend to optimize fluid balance for High risk PCI on Monday 03/02-For PCI tomorrow  03/03-For High risk PCI oLM/LAD today  03/04-Had HD 1.5L removed PCI today    03/05-s/p PCI-LM/LAD     03/06 Awake no dyspnea Taper off Milrinone        LE EDEMA no DVT      # JAMEL  Creatinine Trend:2.35 <--2.55 <--2.23<-- 3.01 <--3.39<--2.65<--2.34<--2.12<--, 1.89<- 3.02<--3.38<--(HD)-- 4.76<--4.07<---3.90<-- 1.17  Has had 3 sessions HD for interrmittent HD to allow contrast based procedures is non oliguric  Had HD 3/2 1800mL removed

## 2025-03-06 NOTE — PROGRESS NOTE ADULT - SUBJECTIVE AND OBJECTIVE BOX
Follow-up Pulm Progress Note    No new respiratory events overnight.  Denies SOB/CP.     Medications:  MEDICATIONS  (STANDING):  aspirin enteric coated 81 milliGRAM(s) Oral daily  atorvastatin 40 milliGRAM(s) Oral at bedtime  benzocaine/menthol Lozenge 1 Lozenge Oral once  bisacodyl 5 milliGRAM(s) Oral every 12 hours  buMETAnide IVPB 4 milliGRAM(s) IV Intermittent daily  chlorhexidine 4% Liquid 1 Application(s) Topical <User Schedule>  clopidogrel Tablet 75 milliGRAM(s) Oral daily  clopidogrel Tablet      dextrose 5%. 1000 milliLiter(s) (50 mL/Hr) IV Continuous <Continuous>  dextrose 5%. 1000 milliLiter(s) (100 mL/Hr) IV Continuous <Continuous>  dextrose 50% Injectable 25 Gram(s) IV Push once  dextrose 50% Injectable 12.5 Gram(s) IV Push once  dextrose 50% Injectable 25 Gram(s) IV Push once  glucagon  Injectable 1 milliGRAM(s) IntraMuscular once  heparin   Injectable 5000 Unit(s) SubCutaneous every 8 hours  insulin glargine Injectable (LANTUS) 13 Unit(s) SubCutaneous at bedtime  insulin lispro (ADMELOG) corrective regimen sliding scale   SubCutaneous three times a day before meals  insulin lispro (ADMELOG) corrective regimen sliding scale   SubCutaneous at bedtime  insulin lispro Injectable (ADMELOG) 5 Unit(s) SubCutaneous three times a day with meals  isosorbide   dinitrate Tablet (ISORDIL) 5 milliGRAM(s) Oral three times a day  milrinone Infusion 0.125 MICROgram(s)/kG/Min (2.7 mL/Hr) IV Continuous <Continuous>  Nephro-rober 1 Tablet(s) Oral daily  pantoprazole  Injectable 40 milliGRAM(s) IV Push daily  polyethylene glycol 3350 17 Gram(s) Oral daily  senna 2 Tablet(s) Oral at bedtime    MEDICATIONS  (PRN):  acetaminophen     Tablet .. 650 milliGRAM(s) Oral every 6 hours PRN Mild Pain (1 - 3)  dextrose Oral Gel 15 Gram(s) Oral once PRN Blood Glucose LESS THAN 70 milliGRAM(s)/deciliter  HYDROmorphone  Injectable 1 milliGRAM(s) IV Push every 6 hours PRN Severe Pain (7 - 10)  melatonin 3 milliGRAM(s) Oral at bedtime PRN Insomnia  ondansetron Injectable 4 milliGRAM(s) IV Push every 6 hours PRN Nausea and/or Vomiting  sodium chloride 0.65% Nasal 1 Spray(s) Both Nostrils three times a day PRN Dryness  sodium chloride 0.9% lock flush 10 milliLiter(s) IV Push every 1 hour PRN Pre/post blood products, medications, blood draw, and to maintain line patency          Vital Signs Last 24 Hrs  T(C): 37 (06 Mar 2025 05:04), Max: 37.2 (05 Mar 2025 20:18)  T(F): 98.6 (06 Mar 2025 05:04), Max: 98.9 (05 Mar 2025 20:18)  HR: 114 (06 Mar 2025 05:04) (112 - 125)  BP: 104/64 (06 Mar 2025 05:04) (96/61 - 106/68)  BP(mean): --  RR: 18 (06 Mar 2025 05:04) (17 - 18)  SpO2: 93% (06 Mar 2025 05:04) (92% - 96%)    Parameters below as of 05 Mar 2025 20:18  Patient On (Oxygen Delivery Method): room air              03-05 @ 07:01  -  03-06 @ 07:00  --------------------------------------------------------  IN: 234.9 mL / OUT: 100 mL / NET: 134.9 mL          LABS:                        9.5    10.51 )-----------( 198      ( 05 Mar 2025 08:39 )             27.9     03-06    127[L]  |  89[L]  |  45[H]  ----------------------------<  140[H]  3.7   |  22  |  3.63[H]    Ca    8.8      06 Mar 2025 07:14  Mg     1.8     03-06            CAPILLARY BLOOD GLUCOSE      POCT Blood Glucose.: 178 mg/dL (06 Mar 2025 08:25)      Urinalysis Basic - ( 06 Mar 2025 07:14 )    Color: x / Appearance: x / SG: x / pH: x  Gluc: 140 mg/dL / Ketone: x  / Bili: x / Urobili: x   Blood: x / Protein: x / Nitrite: x   Leuk Esterase: x / RBC: x / WBC x   Sq Epi: x / Non Sq Epi: x / Bacteria: x              Physical Examination:  PULM: coarse at bases   CVS: S1, S2 heard    RADIOLOGY REVIEWED  CXR 2/27: congestion   bibasilar atelectasis/effusions     CT chest:< from: CT Chest No Cont (01.25.25 @ 14:08) >  ACC: [id number] EXAM:  CT CHEST   ORDERED BY: TED MONROY     PROCEDURE DATE:  01/25/2025        INTERPRETATION:  Clinical information: Shortness of breath.    CT scan of the chest was obtained without administration of intravenous   contrast.    Several lymph nodes are present in the mediastinum.    Heart is enlarged in size. Calcification of the coronary arteries is   noted. Small pericardial effusion is noted.    No endobronchial lesions are noted. Patchy groundglass opacities noted   within both lungs are felt to be secondary to expiratory   technique/breathing artifact. Atelectasis is noted involving portions of   both lower lobes. This is secondary to small bilateral pleural effusions.    Below the diaphragm, visualized portions of the abdomen demonstrate   patient to be status post cholecystectomy.    Degenerative changes of the spine are noted.    IMPRESSION: Small bilateral pleural effusions and small pericardial   effusion.    --- End of Report ---    < end of copied text >

## 2025-03-06 NOTE — PROGRESS NOTE ADULT - SUBJECTIVE AND OBJECTIVE BOX
MR#[id number]  PATIENT NAME:COLE ALBA    DATE OF SERVICE: 03-06-25 @ 07:26  Patient was seen and examined by Yordy Gilmore MD on    03-06-25 @ 07:26 .  Interim events noted.Consultant notes ,Labs,Telemetry reviewed by me       HOSPITAL COURSE: HPI:  64F, hx of CHF (last TTE on 1/16/25 EF 30-35%, G2DD), DM, diabetic foot ulcer with a recent admission at [facility] for CHF exacerbation presented as a transfer for worsening R. leg pain and fluid secretion, On non-invasive imaging demonstrating severe depletion of RLE blood flow beyond the popliteal vessel at knee and similar findings in L. Was transferred to [facility] for CO2 angiogram with Dr. Baltazar. (29 Jan 2025 20:05)      INTERIM EVENTS:Patient seen at bedside ,interim events noted.  02/14-Had cath Multivessel CAD started on Milrinone for CTS evaluation albeit targets not optimal  02/15-Awake on Milrinone and Bumex gtt-seen by CTS not a surgical candidate-needs Vascular work-up for LE PAD-scheduled for Angiogram on Wednesday    Weight 170Kg---> 164 Kg--->161.1 Kg---151.2 ---> 155 --> 154.7 ---> 158 --> 148--->147 >146   Kg    02/25-s/p RLE angiogram with pop and AT angioplasty   02/27-Awake had iHD plan for High risk PCI oLM/LAD tomorrow 1000mL removed  Had ? pAF ~~3 secs  02/28-Awake no dyspnea chest pain plan for PCI-oLM/LAD todat HD after PCI-Sinus rhythm  03/01-Had C PCWP-15 still elevated with low BP Plan to dialyse over weekend and plan for PCI on Monday-No dyspnea  03/02-Awake had HD removed 1500Ml no dyspnea Plan for PCI tomorrow  03/03-Awake Sinus rhythm no dyspnea FOR High risk PCI oLM/LAD   03/04-Awake had HD last night-1500mL removed For High risk PCI today  03/05-Awake s/p LHC VERA x 1 to LAD, VERA to LM , POBA to CX via RFA    03/06-Awake no dyspnea chest pain    PMH -reviewed admission note, no change since admission  HEART FAILURE: Acute[x ]Chronic[ ] Systolic[x ] Diastolic[ ] Combined Systolic and Diastolic[ ]  CAD[x ] CABG[ ] PCI[ ]  DEVICES[ ] PPM[ ] ICD[ ] ILR[ ]  ATRIAL FIBRILLATION[ ] Paroxysmal[ ] Permanent[ ] CHADS2-[  ]  JAMEL[x ] CKD1[ ] CKD2[ ] CKD3[ ] CKD4[x ] ESRD[ ]  COPD[ ] HTN[x ]   DM[x ] Type1[ ] Type 2[x ]   CVA[ ] Paresis[ ]    AMBULATION: Assisted[x ] Cane/walker[ ] Independent[ ]  MEDICATIONS  (STANDING):  aspirin enteric coated 81 milliGRAM(s) Oral daily  atorvastatin 40 milliGRAM(s) Oral at bedtime  benzocaine/menthol Lozenge 1 Lozenge Oral once  bisacodyl 5 milliGRAM(s) Oral every 12 hours  buMETAnide IVPB 4 milliGRAM(s) IV Intermittent daily  chlorhexidine 4% Liquid 1 Application(s) Topical <User Schedule>  clopidogrel Tablet 75 milliGRAM(s) Oral daily  clopidogrel Tablet      dextrose 5%. 1000 milliLiter(s) (50 mL/Hr) IV Continuous <Continuous>  dextrose 5%. 1000 milliLiter(s) (100 mL/Hr) IV Continuous <Continuous>  dextrose 50% Injectable 25 Gram(s) IV Push once  dextrose 50% Injectable 12.5 Gram(s) IV Push once  dextrose 50% Injectable 25 Gram(s) IV Push once  glucagon  Injectable 1 milliGRAM(s) IntraMuscular once  heparin   Injectable 5000 Unit(s) SubCutaneous every 8 hours  insulin glargine Injectable (LANTUS) 13 Unit(s) SubCutaneous at bedtime  insulin lispro (ADMELOG) corrective regimen sliding scale   SubCutaneous three times a day before meals  insulin lispro (ADMELOG) corrective regimen sliding scale   SubCutaneous at bedtime  insulin lispro Injectable (ADMELOG) 5 Unit(s) SubCutaneous three times a day with meals  isosorbide   dinitrate Tablet (ISORDIL) 5 milliGRAM(s) Oral three times a day  milrinone Infusion 0.25 MICROgram(s)/kG/Min (5.41 mL/Hr) IV Continuous <Continuous>  Nephro-rober 1 Tablet(s) Oral daily  pantoprazole  Injectable 40 milliGRAM(s) IV Push daily  polyethylene glycol 3350 17 Gram(s) Oral daily  senna 2 Tablet(s) Oral at bedtime    MEDICATIONS  (PRN):  acetaminophen     Tablet .. 650 milliGRAM(s) Oral every 6 hours PRN Mild Pain (1 - 3)  dextrose Oral Gel 15 Gram(s) Oral once PRN Blood Glucose LESS THAN 70 milliGRAM(s)/deciliter  HYDROmorphone  Injectable 1 milliGRAM(s) IV Push every 6 hours PRN Severe Pain (7 - 10)  melatonin 3 milliGRAM(s) Oral at bedtime PRN Insomnia  ondansetron Injectable 4 milliGRAM(s) IV Push every 6 hours PRN Nausea and/or Vomiting  sodium chloride 0.65% Nasal 1 Spray(s) Both Nostrils three times a day PRN Dryness  sodium chloride 0.9% lock flush 10 milliLiter(s) IV Push every 1 hour PRN Pre/post blood products, medications, blood draw, and to maintain line patency            REVIEW OF SYSTEMS:  Constitutional: [ ] fever, [ ]weight loss,  [ ]fatigue [ ]weight gain  Eyes: [ ] visual changes  Respiratory: [ ]shortness of breath;  [ ] cough, [ ]wheezing, [ ]chills, [ ]hemoptysis  Cardiovascular: [ ] chest pain, [ ]palpitations, [ ]dizziness,  [ ]leg swelling[ ]orthopnea[ ]PND  Gastrointestinal: [ ] abdominal pain, [ ]nausea, [ ]vomiting,  [ ]diarrhea [ ]Constipation [ ]Melena  Genitourinary: [ ] dysuria, [ ] hematuria [ ][place]  Neurologic: [ ] headaches [ ] tremors[ ]weakness [ ]Paralysis Right[ ] Left[ ]  Skin: [ ] itching, [ ]burning, [ ] rashes  Endocrine: [ ] heat or cold intolerance  Musculoskeletal: [ ] joint pain or swelling; [ ] muscle, back, or extremity pain  Psychiatric: [ ] depression, [ ]anxiety, [ ]mood swings, or [ ]difficulty sleeping  Hematologic: [ ] easy bruising, [ ] bleeding gums    [ ] All remaining systems negative except as per above.   [ ]Unable to obtain.  [x] No change in ROS since admission      Vital Signs Last 24 Hrs  T(C): 37 (06 Mar 2025 05:04), Max: 37.2 (05 Mar 2025 20:18)  T(F): 98.6 (06 Mar 2025 05:04), Max: 98.9 (05 Mar 2025 20:18)  HR: 114 (06 Mar 2025 05:04) (112 - 125)  BP: 104/64 (06 Mar 2025 05:04) (93/55 - 106/68)  RR: 18 (06 Mar 2025 05:04) (17 - 18)  SpO2: 93% (06 Mar 2025 05:04) (92% - 96%)    Parameters below as of 05 Mar 2025 20:18  Patient On (Oxygen Delivery Method): room air      I&O's Summary    05 Mar 2025 07:01  -  06 Mar 2025 07:00  --------------------------------------------------------  IN: 234.9 mL / OUT: 100 mL / NET: 134.9 mL        PHYSICAL EXAM:  General: No acute distress BMI-28  HEENT: EOMI, PERRL  Neck: Supple, [ ] JVD  Lungs: Equal air entry bilaterally; [ ] rales [ ] wheezing [ ] rhonchi  Heart: Regular rate and rhythm; [x ] murmur   2/6 [ x] systolic [ ] diastolic [ ] radiation[ ] rubs [ ]  gallops  Abdomen: Nontender, bowel sounds present  Extremities: No clubbing, cyanosis, [ ] edema [x ]Warm, well perfused        RLE: Dressings in place, blistering and ulceration on the dorsal aspect of the foot        LLE: First digit dry gangrene on the plantar aspect  Nervous system:  Alert & Oriented X3, no focal deficits  Psychiatric: Normal affect  Skin: No rashes or lesions    LABS:  03-05    128[L]  |  88[L]  |  35[H]  ----------------------------<  189[H]  3.6   |  25  |  2.35[H]    Ca    8.5      05 Mar 2025 08:43  Mg     1.7     03-05      Creatinine Trend: 2.35<--, 2.36<--, 2.55<--, 2.23<--, 3.65<--, 3.12<--                        9.5    10.51 )-----------( 198      ( 05 Mar 2025 08:39 )             27.9            Cardiac Catheterization (03.04.25 @ 09:07) >  Conclusions:   Impella placed for HR-PCI (pt was turned down for CABG). EF 25%     Severe proximal LCx stenosis s/p successful rotational atherectomy and balloon angioplasty. Severe LM and proximal LAD stenosis s/p successful rotational atherectomy, balloon angioplasty, and VERA x2.  Recommendations:   Triple therapy for one day and then continue Eliquis and Plavix.

## 2025-03-06 NOTE — PROGRESS NOTE ADULT - ASSESSMENT
65 y/o F with PMH of CHF (last TTE 1/2025 with EF 30-35%), DM, diabetic foot ulcer, PAD, CAD. Recent admission at [facility] for CHF exacerbation, presented to [facility] as a transfer for worsening R leg pain. Hospital course c/b acute on chronic CHF - TTE with EF 20%, severely decreased LV and RV function, multiple regional motion abnormalities, s/p LHC revealing TVD, pleural/pericardial effusions, JAMEL, leukocytosis suspected to be in the setting of LE wound on IV ABX, persistent RLE pain w/ RLE Arterial Duplex revealing popliteal artery occlusion  Plan for eventual angiogram with vascular when medically optimized. Pulmonary called to consult as pt with c/o SOB.

## 2025-03-06 NOTE — PROGRESS NOTE ADULT - ASSESSMENT
65 YO F with PMHx of HFrEF 30-35 and grade 2 ddfxn (last TTE on 01/16/25), DM2, and diabetic foot ulcer. Recent admission at [facility] for ADHF. Patient now represents to OSH and ultimately to [facility] as a transfer for worsening right leg pain and fluid secretion. As per documentation, patient was reported to have severe depletion of RLE blood flow beyond the popliteal vessel at knee and similar findings in the left. Patient was transferred to [facility] for CO2 angiogram with Dr. Baltazar. Of note, patient also with reported visual disturbance for which patient was seen and evaluated by opthalmology. Internal Medicine has been consulted on Ms. Kirby's care for medical management.     # ADHF/ BiV HFrEF 20   - Recent admission at [facility] for ADHF and on dobutamine outpatient  - TTE 1/16 with EF 30-35 with moderately reduced LVSF and grade 2 ddfxn, normal RVSF , trace TR/ IL, and trace pericardial effusion  - NST abnormal with small-sized, moderate defect in apical wall that is predominantly fixed suggestive of an infarction with minimal marcus-infarct ischemia. Post stress LVEF 25.  - CT Chest with small BL pleural effusions and small pericardial effusion.  - RPT TTE with EF 20, severely decreased LV, decreased RVSF with TAPSE 1.2, and regional wall motion abnormalities present with entire septum, entire apex, entire inferior wall, mid inferolateral segment, and mid anterolateral segment are hypokinetic.   - RHC with RA 20, PA 49/29 (40), PCWP 35, mVO2 51.1, CO/CI 3.8/2.2, SVR 1095 w/ Multivessel CAD   - s/p zaroxyln 10 QD  - CRE and sodium rising and bumex GTT stopped 2/18 and HTS stopped 2/16   - RPT limited TTE w/ LVSF severely decreased, reduced RVSF, LVOT VTI 12, mild to mod TR, and mildly elevated right atrial pressure  - Continues on milrinone gtt as per Cardio   - Continue on hydral 25 and isordil 5 --> Hydralazine on hold 2/2 soft BP   - HF and cards following and adjusting medications   - Case discussed with EP and needs to be on GDMT for 3 months before AICD placement and should be stabilized off of inotropic support.   - Monitor I and O, diuresis per Cardio/ Renal    - GDMT as per Cardio -> on Hydralazine, Isosorbide   - Monitor volume status   - Check daily weights    - Monitor on telemetry; Monitor electrolytes   - Cardio, HF, Renal, and EP evals appreciated; F/u recs   - S/P high risk PCI, stent placed. On DAPT     # Tachycardia and Hypoxia  - CT Angio ordered to R/O PE  - Monitor closely on Tele    # CAD with TVD   - [facility] with RCA , severe ostial LM disease, diffuse disease in LAD and LCx, some L to R collaterals (Multivessel CAD)  - Case discussed with CTSx and NOT a candidate for CABG due to comorbidities  - Cardiac MRI with greater than 75% subendocardial scarring of the basal to mid-inferior wall of the LV and 50% scarring of the left ventricular basal inferoseptal wall. There is also left ventricular transmural scarring involving the apical septal wall and likely near transmural scarring of the apical lateral wall.  - S/P RPT [facility] with high risk PCI. follow up card recs   - DAPT and Statin       # BL LE Edema likely in setting of ADHF  - Diuresis as per Cardio, HF and Renal   - BL LE elevation and compression  - LE Duplex neg   - Monitor for now  - Podiatry and Vascular evals appreciated; F/u recs -->S/P angio w/ angioplasty with vascular, on DAPT     # Hyponatremia  - F/u labs post HD. Likely 2/2 volume overload    - Monitor Na level; void overcorrection > 6-8 mEq in 24 hours  - Renal eval appreciated; F/u recs    # Pericardial effusion likely in setting ADHF  - TTE with trace pericardial effusion   - CT Chest with small pericardial effusion   - Denies chest pain, palpitations, chest tightness or discomfort, shortness of breath or dyspnea   - Repeat TTE in 1 month for further evaluation   - Diuresis per cardio/ renal.  - Monitor on telemetry  - Cardio following    # Pleural effusion likely in setting ADHF  - CT Chest with small BL pleural effusions  - Monitor O2 saturation  - Supplement to maintain > 90%   - Diuresis per cardio/ renal.     # JAMEL with Hyponatremia and Metabolic Acidosis   - Baseline CRE 0.7-1.2 and increased to ~4  - As per OSH documentation, JAMEL was thought to be second to Unasyn/ Bumex / Entresto use and Hypotension   - US RENAL with no hydronephrosis  - Likely cardiorenal induced with low flow state in ADHF, however now rising again and concern for milrinone vs overdiuresis (prerenal) vs cardiorenal.   - Milrinone adjusted and diuresis turned off, however no improvement/ worsened in renal function noted.   - s/p shiley placement and started on HD 2/20  - c/w HD per renal   - HF and renal following   - Avoid nephrotoxic agents  - Monitor Cr and daily BMP     # Transaminitis  - Likely 2/2 hepatic congestion   - Volume removal with HD as tolerated  - Trend LFTs, if uptrend post-HD initiation, check ABD US  - Serial ABD Examinations    # Hypernatremia   - Hypernatremia likely from HTS vs over diuresis/ intravascular dehydration   - Monitor sodium     # Leukocytosis likely in setting of BL LE ulcers with pop occlusion   - BCx negative   - UCx with probable contamination   - S/P unasyn for ABX.   - Leukocytosis fluctuating, however appears nontoxic with no fever spikes and monitoring closely on below unasyn.   - If febrile check pan cultures   - Trend CBC, temp curve, VS and adjust as tolerated    # Foot ulcers with worsening RLE pain second to pop artery occlusion +/- diabetic ulcers   - RLE Arterial Duplex with popliteal artery occlusion   - LLE Arterial Duplex with underlying disease cannot be excluded   - s/p angio with pop and AT VERA on 2/24   - Continue on Lipitor  - Continue on DAPT  - Continue pain control with oxy  - Wound care called for dressing recs   - Vascular following,     # Visual Disturbance  - CT Head w/ old infarcts  - On ASA and Statin   - Opthalmology eval appreciated    # Indigestion and Constipation   - PPI, miralax and senna continued  - Monitor BMs    # Anemia likely mixed AOCD vs iron deficiency   - HH stable in 9s on HSQ  - Anemia panel with AOCD   - Started on venofer, but ferritin high and now stopped   - Continue on EPO with HD  - Monitor HH   - Transfuse for Hgb < 8  - Maintain active T/S    # Diabetes Mellitus A1C 11.6   - Continue on lantus 10 with tradjecta 5 and ISS   - Diabetic DASH diet   - Monitor and adjust glucose levels PRN   - Endocrine following; F/u recs     # HLD and HLD  - Continue on lipitor and toprol  - Monitor BP    # DISPO TBD  - Palliative care called and patient remains FULL CODE     Discussed with Attending, Pulm and ACP

## 2025-03-06 NOTE — PROGRESS NOTE ADULT - SUBJECTIVE AND OBJECTIVE BOX
Name of Patient : TOMASZ ALBA  MRN: [id number]  Date of visit: 03-06-25 @ 16:20      Subjective: Patient seen and examined. No new events except as noted.   Patient seen earlier this AM. Reports cough is much improved  Tachycardic and O2 saturation low 90s, CT Angio ordered    REVIEW OF SYSTEMS:    CONSTITUTIONAL: Generalized weakness   EYES/ENT: No visual changes;  No vertigo or throat pain   NECK: No pain or stiffness  RESPIRATORY: No cough, wheezing, hemoptysis; No shortness of breath  CARDIOVASCULAR: No chest pain or palpitations  GASTROINTESTINAL: No abdominal or epigastric pain. No nausea, vomiting, or hematemesis; No diarrhea or constipation. No melena or hematochezia.  GENITOURINARY: No dysuria, frequency or hematuria  NEUROLOGICAL: No numbness or weakness  SKIN: LE wrapped in ACE Wrap   All other review of systems is negative unless indicated above.    MEDICATIONS:  MEDICATIONS  (STANDING):  aspirin enteric coated 81 milliGRAM(s) Oral daily  atorvastatin 40 milliGRAM(s) Oral at bedtime  benzocaine/menthol Lozenge 1 Lozenge Oral once  bisacodyl 5 milliGRAM(s) Oral every 12 hours  buMETAnide IVPB 4 milliGRAM(s) IV Intermittent daily  chlorhexidine 4% Liquid 1 Application(s) Topical <User Schedule>  clopidogrel Tablet 75 milliGRAM(s) Oral daily  clopidogrel Tablet      dextrose 5%. 1000 milliLiter(s) (100 mL/Hr) IV Continuous <Continuous>  dextrose 5%. 1000 milliLiter(s) (50 mL/Hr) IV Continuous <Continuous>  dextrose 50% Injectable 25 Gram(s) IV Push once  dextrose 50% Injectable 12.5 Gram(s) IV Push once  dextrose 50% Injectable 25 Gram(s) IV Push once  glucagon  Injectable 1 milliGRAM(s) IntraMuscular once  heparin   Injectable 5000 Unit(s) SubCutaneous every 8 hours  insulin glargine Injectable (LANTUS) 13 Unit(s) SubCutaneous at bedtime  insulin lispro (ADMELOG) corrective regimen sliding scale   SubCutaneous three times a day before meals  insulin lispro (ADMELOG) corrective regimen sliding scale   SubCutaneous at bedtime  insulin lispro Injectable (ADMELOG) 5 Unit(s) SubCutaneous three times a day with meals  isosorbide   dinitrate Tablet (ISORDIL) 5 milliGRAM(s) Oral three times a day  milrinone Infusion 0.125 MICROgram(s)/kG/Min (2.7 mL/Hr) IV Continuous <Continuous>  Nephro-rober 1 Tablet(s) Oral daily  pantoprazole  Injectable 40 milliGRAM(s) IV Push daily  polyethylene glycol 3350 17 Gram(s) Oral daily  senna 2 Tablet(s) Oral at bedtime      PHYSICAL EXAM:  T(C): 36.6 (03-06-25 @ 12:37), Max: 37.2 (03-05-25 @ 20:18)  HR: 116 (03-06-25 @ 12:37) (112 - 116)  BP: 94/58 (03-06-25 @ 12:37) (94/58 - 106/68)  RR: 17 (03-06-25 @ 12:37) (17 - 18)  SpO2: 93% (03-06-25 @ 12:37) (92% - 95%)  Wt(kg): --  I&O's Summary    05 Mar 2025 07:01  -  06 Mar 2025 07:00  --------------------------------------------------------  IN: 234.9 mL / OUT: 100 mL / NET: 134.9 mL    06 Mar 2025 07:01  -  06 Mar 2025 16:20  --------------------------------------------------------  IN: 120 mL / OUT: 0 mL / NET: 120 mL          Appearance: Normal	  HEENT: Eyes are open   Lymphatic: No lymphadenopathy grossly   Cardiovascular: Normal S1 S2   Respiratory: normal effort , clear  Gastrointestinal:  Soft, Non-tender  Skin: + Shiley; No rashes,  warm to touch  Psychiatry:  Mood & affect appropriate  Musculoskeletal: B/L LE wrapped in ACE wrap            03-05-25 @ 07:01  -  03-06-25 @ 07:00  --------------------------------------------------------  IN: 234.9 mL / OUT: 100 mL / NET: 134.9 mL    03-06-25 @ 07:01  -  03-06-25 @ 16:21  --------------------------------------------------------  IN: 120 mL / OUT: 0 mL / NET: 120 mL                                9.5    10.51 )-----------( 198      ( 05 Mar 2025 08:39 )             27.9               03-06    127[L]  |  89[L]  |  45[H]  ----------------------------<  140[H]  3.7   |  22  |  3.63[H]    Ca    8.8      06 Mar 2025 07:14  Mg     1.8     03-06                         Urinalysis Basic - ( 06 Mar 2025 07:14 )    Color: x / Appearance: x / SG: x / pH: x  Gluc: 140 mg/dL / Ketone: x  / Bili: x / Urobili: x   Blood: x / Protein: x / Nitrite: x   Leuk Esterase: x / RBC: x / WBC x   Sq Epi: x / Non Sq Epi: x / Bacteria: x

## 2025-03-06 NOTE — PROGRESS NOTE ADULT - PROBLEM SELECTOR PLAN 4
-S/p LHC with multivessel disease  -S/p LM/LAD PCI, VERA x 1 to LAD, VERA to LM, POBA to CX on 3/4  -Cardiology f/u

## 2025-03-06 NOTE — PROGRESS NOTE ADULT - ASSESSMENT
64y Female with history of CHF presents as a transfer for LE CO2 angiogram. Nephrology consulted for elevated Scr.    1) JAMEL: likely due to ATN and CRS for which patient initiated on RRT on 2/20 with last UF on 3/3 in preparation for LHC on 3/4. Scr increasing likely due to contrast nephropathy. Will plan for HD on 3/7 if Scr continues to increase. Check bladder scan given recent TOV. On milrinone gtt as per HF. If no improvement in renal function by next week, will plan for shiley to TDC conversion (ok to keep shiley in for up to 3 weeks as per IR). Avoid nephrotoxins.    2) HTN: BP low normal. Isordil as per cardiology. Monitor BP.    3) LE edema: On bumex 4 mg IV daily. TTE with severely decreased LVSF. Monitor UO.     4) Anemia: Hb improving with low TSAT. No IV iron given elevated ferritin. S/P Epo 8K X 1 dose 3/1. Monitor Hb.    5) Hyponatremia: Secondary to JAMEL and polydipsia. Start 1L [facility] NEPHROLOGY  Raji Waterman M.D.  Mars Feliz D.O.  Kelley Parks M.D.  MD Nancy Kulkarni, MSN, ANP-C    Telephone: [phone]  Facsimile: [phone] [place] [address], [address]  [place] [address]

## 2025-03-06 NOTE — PROGRESS NOTE ADULT - SUBJECTIVE AND OBJECTIVE BOX
[facility] NEPHROLOGY- PROGRESS NOTE    64y Female with history of CHF presents as a transfer for LE CO2 angiogram. Nephrology consulted for elevated Scr.      REVIEW OF SYSTEMS:  Gen: no fevers  Cards: no chest pain  Resp: no dyspnea  GI: no nausea or vomiting or diarrhea  Vascular: + LE edema improving    Augmentin (Stomach Upset; Vomiting; Nausea)  No Known Allergies      Hospital Medications: Medications reviewed        VITALS:  T(F): 97.9 (03-06-25 @ 12:37), Max: 98.9 (03-05-25 @ 20:18)  HR: 116 (03-06-25 @ 12:37)  BP: 94/58 (03-06-25 @ 12:37)  RR: 17 (03-06-25 @ 12:37)  SpO2: 93% (03-06-25 @ 12:37)  Wt(kg): --    03-05 @ 07:01  -  03-06 @ 07:00  --------------------------------------------------------  IN: 234.9 mL / OUT: 100 mL / NET: 134.9 mL    03-06 @ 07:01  -  03-06 @ 15:08  --------------------------------------------------------  IN: 120 mL / OUT: 0 mL / NET: 120 mL        PHYSICAL EXAM:    Gen: NAD, calm  Cards: RRR, +S1/S2, no M/G/R  Resp: CTA B/L  GI: soft, NT/ND, NABS  : no stiles  Vascular: + LE wrapped B/L with RLE edema > LLE edema, + RIJ subclavian        LABS:  03-06    127[L]  |  89[L]  |  45[H]  ----------------------------<  140[H]  3.7   |  22  |  3.63[H]    Ca    8.8      06 Mar 2025 07:14  Mg     1.8     03-06      Creatinine Trend: 3.63 <--, 2.35 <--, 2.36 <--, 2.55 <--, 2.23 <--, 3.65 <--                        9.5    10.51 )-----------( 198      ( 05 Mar 2025 08:39 )             27.9     Urine Studies:  Urinalysis Basic - ( 06 Mar 2025 07:14 )    Color:  / Appearance:  / SG:  / pH:   Gluc: 140 mg/dL / Ketone:   / Bili:  / Urobili:    Blood:  / Protein:  / Nitrite:    Leuk Esterase:  / RBC:  / WBC    Sq Epi:  / Non Sq Epi:  / Bacteria:

## 2025-03-06 NOTE — PROGRESS NOTE ADULT - SUBJECTIVE AND OBJECTIVE BOX
Interventional Cardiology Post Cath Progress Note:                Subjective: Patient feels well- no current complaints- Denies chest pain, shortness of breath. Denies pain, numbness, tingling or swelling around (groin/wrist) access site    Tele 24hrs: ST @ 114 bpm      MEDICATIONS  (STANDING):  aspirin enteric coated 81 milliGRAM(s) Oral daily  atorvastatin 40 milliGRAM(s) Oral at bedtime  benzocaine/menthol Lozenge 1 Lozenge Oral once  bisacodyl 5 milliGRAM(s) Oral every 12 hours  buMETAnide IVPB 4 milliGRAM(s) IV Intermittent daily  chlorhexidine 4% Liquid 1 Application(s) Topical <User Schedule>  clopidogrel Tablet 75 milliGRAM(s) Oral daily  clopidogrel Tablet      dextrose 5%. 1000 milliLiter(s) (50 mL/Hr) IV Continuous <Continuous>  dextrose 5%. 1000 milliLiter(s) (100 mL/Hr) IV Continuous <Continuous>  dextrose 50% Injectable 25 Gram(s) IV Push once  dextrose 50% Injectable 12.5 Gram(s) IV Push once  dextrose 50% Injectable 25 Gram(s) IV Push once  glucagon  Injectable 1 milliGRAM(s) IntraMuscular once  heparin   Injectable 5000 Unit(s) SubCutaneous every 8 hours  insulin glargine Injectable (LANTUS) 13 Unit(s) SubCutaneous at bedtime  insulin lispro (ADMELOG) corrective regimen sliding scale   SubCutaneous three times a day before meals  insulin lispro (ADMELOG) corrective regimen sliding scale   SubCutaneous at bedtime  insulin lispro Injectable (ADMELOG) 5 Unit(s) SubCutaneous three times a day with meals  isosorbide   dinitrate Tablet (ISORDIL) 5 milliGRAM(s) Oral three times a day  milrinone Infusion 0.125 MICROgram(s)/kG/Min (2.7 mL/Hr) IV Continuous <Continuous>  Nephro-rober 1 Tablet(s) Oral daily  pantoprazole  Injectable 40 milliGRAM(s) IV Push daily  polyethylene glycol 3350 17 Gram(s) Oral daily  senna 2 Tablet(s) Oral at bedtime    MEDICATIONS  (PRN):  acetaminophen     Tablet .. 650 milliGRAM(s) Oral every 6 hours PRN Mild Pain (1 - 3)  dextrose Oral Gel 15 Gram(s) Oral once PRN Blood Glucose LESS THAN 70 milliGRAM(s)/deciliter  HYDROmorphone  Injectable 1 milliGRAM(s) IV Push every 6 hours PRN Severe Pain (7 - 10)  melatonin 3 milliGRAM(s) Oral at bedtime PRN Insomnia  ondansetron Injectable 4 milliGRAM(s) IV Push every 6 hours PRN Nausea and/or Vomiting  sodium chloride 0.65% Nasal 1 Spray(s) Both Nostrils three times a day PRN Dryness  sodium chloride 0.9% lock flush 10 milliLiter(s) IV Push every 1 hour PRN Pre/post blood products, medications, blood draw, and to maintain line patency      Objective:  Vital Signs Last 24 Hrs  T(C): 37 (06 Mar 2025 05:04), Max: 37.2 (05 Mar 2025 20:18)  T(F): 98.6 (06 Mar 2025 05:04), Max: 98.9 (05 Mar 2025 20:18)  HR: 114 (06 Mar 2025 05:04) (112 - 125)  BP: 104/64 (06 Mar 2025 05:04) (93/55 - 106/68)  BP(mean): --  RR: 18 (06 Mar 2025 05:04) (17 - 18)  SpO2: 93% (06 Mar 2025 05:04) (92% - 96%)    Parameters below as of 05 Mar 2025 20:18  Patient On (Oxygen Delivery Method): room air        03-05-25 @ 07:01  -  03-06-25 @ 07:00  --------------------------------------------------------  IN: 234.9 mL / OUT: 100 mL / NET: 134.9 mL                              9.5    10.51 )-----------( 198      ( 05 Mar 2025 08:39 )             27.9     03-06    127[L]  |  89[L]  |  45[H]  ----------------------------<  140[H]  3.7   |  22  |  3.63[H]    Ca    8.8      06 Mar 2025 07:14  Mg     1.8     03-06      Physical Exam:  No apparent distress, alert and oriented times three, appropriate affect  JVD is not elevated, supple  Clear to auscultation with no wheezing, ronchi or crackles  Regular rate and rhythm with no murmur, rub or gallop  Positive bowel sounds, soft, non-tender, non-distended, no masses/guarding or rebound tenderness  Right groin: Soft, non tender, no bleeding or hematoma, clean/dry/intact- RLE/LLE +2 (palpable femoral pulse)  No clubbing, cyanosis or edema      Assessment/Plan:   64F, with PMHx CHF (last TTE on 1/16/25 EF 30-35%, G2DD), DM, diabetic foot ulcer, ESRD-HD with a recent admission at [facility] for CHF exacerbation presented as a transfer for worsening R. leg pain and fluid overload.  3/4  High risk LM/LAD PCI, VERA x 1 to LAD, VERA to LM , POBA to CX    RFA Perclose   - Groin site stable.   - Continue DAPT (aspirin 81mg and clopidogrel 75mg)  - Continue atorvastatin 40 mg  - EF 30-35%. GDMT-Milrinone gtt, Isordil  - Recommend a heart healthy diet which includes a variety of fruits and vegetables, whole grains, low fat dairy products, legumes and skinless poulty and fish; food prepared with little or no salt and minimize processed foods  - Avoid using NSAIDs  (Aleve, Motrin, ibuprofen, naproxen) while on DAPT, please utilize Tylenol for pain control (not to exceed 4gm in 24 hours)  - Care per primary team  -HF team following  -Cards Dr Mando Scales following.     Please check [email or web address] password [email or web address] for cardiology service schedule and contact information via TEAMS.

## 2025-03-06 NOTE — CHART NOTE - NSCHARTNOTEFT_GEN_A_CORE
IR consulted for Shiley catheter exchange. Last placed on 2/20. Non-tunneled HD catheters may be left in place for up to 21 days if there are no signs of infection involving the line. Nephrology recommendations appreciated. Please re-consult when plans for long-term HD are established and will plan for apolonia vs permacatisa accordingly.    MARI Giraldo PGY3

## 2025-03-07 LAB
ANION GAP SERPL CALC-SCNC: 19 MMOL/L — HIGH (ref 5–17)
BUN SERPL-MCNC: 53 MG/DL — HIGH (ref 7–23)
CALCIUM SERPL-MCNC: 9.1 MG/DL — SIGNIFICANT CHANGE UP (ref 8.4–10.5)
CHLORIDE SERPL-SCNC: 86 MMOL/L — LOW (ref 96–108)
CO2 SERPL-SCNC: 21 MMOL/L — LOW (ref 22–31)
CREAT SERPL-MCNC: 4.46 MG/DL — HIGH (ref 0.5–1.3)
EGFR: 11 ML/MIN/1.73M2 — LOW
EGFR: 11 ML/MIN/1.73M2 — LOW
GLUCOSE BLDC GLUCOMTR-MCNC: 104 MG/DL — HIGH (ref 70–99)
GLUCOSE BLDC GLUCOMTR-MCNC: 133 MG/DL — HIGH (ref 70–99)
GLUCOSE BLDC GLUCOMTR-MCNC: 134 MG/DL — HIGH (ref 70–99)
GLUCOSE BLDC GLUCOMTR-MCNC: 167 MG/DL — HIGH (ref 70–99)
GLUCOSE SERPL-MCNC: 121 MG/DL — HIGH (ref 70–99)
MAGNESIUM SERPL-MCNC: 2.1 MG/DL — SIGNIFICANT CHANGE UP (ref 1.6–2.6)
PHOSPHATE SERPL-MCNC: 4.8 MG/DL — HIGH (ref 2.5–4.5)
POTASSIUM SERPL-MCNC: 4.5 MMOL/L — SIGNIFICANT CHANGE UP (ref 3.5–5.3)
POTASSIUM SERPL-SCNC: 4.5 MMOL/L — SIGNIFICANT CHANGE UP (ref 3.5–5.3)
SODIUM SERPL-SCNC: 126 MMOL/L — LOW (ref 135–145)

## 2025-03-07 PROCEDURE — 71275 CT ANGIOGRAPHY CHEST: CPT | Mod: 26

## 2025-03-07 PROCEDURE — 99232 SBSQ HOSP IP/OBS MODERATE 35: CPT

## 2025-03-07 PROCEDURE — 71045 X-RAY EXAM CHEST 1 VIEW: CPT | Mod: 26

## 2025-03-07 RX ORDER — INSULIN GLARGINE-YFGN 100 [IU]/ML
12 INJECTION, SOLUTION SUBCUTANEOUS AT BEDTIME
Refills: 0 | Status: DISCONTINUED | OUTPATIENT
Start: 2025-03-07 | End: 2025-03-10

## 2025-03-07 RX ORDER — MIDODRINE HYDROCHLORIDE 5 MG/1
10 TABLET ORAL ONCE
Refills: 0 | Status: COMPLETED | OUTPATIENT
Start: 2025-03-07 | End: 2025-03-07

## 2025-03-07 RX ADMIN — Medication 1 MILLIGRAM(S): at 18:59

## 2025-03-07 RX ADMIN — Medication 1 TABLET(S): at 11:46

## 2025-03-07 RX ADMIN — INSULIN GLARGINE-YFGN 12 UNIT(S): 100 INJECTION, SOLUTION SUBCUTANEOUS at 21:33

## 2025-03-07 RX ADMIN — Medication 2 TABLET(S): at 21:34

## 2025-03-07 RX ADMIN — Medication 1 MILLIGRAM(S): at 04:33

## 2025-03-07 RX ADMIN — INSULIN LISPRO 1: 100 INJECTION, SOLUTION INTRAVENOUS; SUBCUTANEOUS at 12:29

## 2025-03-07 RX ADMIN — MILRINONE LACTATE 2.7 MICROGRAM(S)/KG/MIN: 1 INJECTION, SOLUTION INTRAVENOUS at 13:04

## 2025-03-07 RX ADMIN — HEPARIN SODIUM 5000 UNIT(S): 1000 INJECTION INTRAVENOUS; SUBCUTANEOUS at 05:47

## 2025-03-07 RX ADMIN — Medication 1 MILLIGRAM(S): at 12:56

## 2025-03-07 RX ADMIN — HEPARIN SODIUM 5000 UNIT(S): 1000 INJECTION INTRAVENOUS; SUBCUTANEOUS at 21:33

## 2025-03-07 RX ADMIN — CLOPIDOGREL BISULFATE 75 MILLIGRAM(S): 75 TABLET, FILM COATED ORAL at 11:47

## 2025-03-07 RX ADMIN — Medication 1 MILLIGRAM(S): at 18:40

## 2025-03-07 RX ADMIN — ATORVASTATIN CALCIUM 40 MILLIGRAM(S): 80 TABLET, FILM COATED ORAL at 21:33

## 2025-03-07 RX ADMIN — Medication 1 MILLIGRAM(S): at 11:13

## 2025-03-07 RX ADMIN — ISOSORBDIE DINITRATE 5 MILLIGRAM(S): 30 TABLET ORAL at 05:47

## 2025-03-07 RX ADMIN — Medication 1 APPLICATION(S): at 08:16

## 2025-03-07 RX ADMIN — Medication 650 MILLIGRAM(S): at 21:33

## 2025-03-07 RX ADMIN — Medication 650 MILLIGRAM(S): at 16:28

## 2025-03-07 RX ADMIN — BUMETANIDE 132 MILLIGRAM(S): 1 TABLET ORAL at 05:47

## 2025-03-07 RX ADMIN — MIDODRINE HYDROCHLORIDE 10 MILLIGRAM(S): 5 TABLET ORAL at 13:04

## 2025-03-07 RX ADMIN — HEPARIN SODIUM 5000 UNIT(S): 1000 INJECTION INTRAVENOUS; SUBCUTANEOUS at 13:01

## 2025-03-07 RX ADMIN — INSULIN LISPRO 5 UNIT(S): 100 INJECTION, SOLUTION INTRAVENOUS; SUBCUTANEOUS at 18:41

## 2025-03-07 RX ADMIN — INSULIN LISPRO 5 UNIT(S): 100 INJECTION, SOLUTION INTRAVENOUS; SUBCUTANEOUS at 08:14

## 2025-03-07 RX ADMIN — Medication 40 MILLIGRAM(S): at 11:46

## 2025-03-07 RX ADMIN — Medication 81 MILLIGRAM(S): at 11:46

## 2025-03-07 RX ADMIN — Medication 1 MILLIGRAM(S): at 05:59

## 2025-03-07 RX ADMIN — INSULIN LISPRO 5 UNIT(S): 100 INJECTION, SOLUTION INTRAVENOUS; SUBCUTANEOUS at 12:29

## 2025-03-07 NOTE — PROGRESS NOTE ADULT - ASSESSMENT
65 y/o F with PMH of CHF (last TTE 1/2025 with EF 30-35%), DM, diabetic foot ulcer, PAD, CAD. Recent admission at [facility] for CHF exacerbation, presented to [facility] as a transfer for worsening R leg pain. Hospital course c/b acute on chronic CHF - TTE with EF 20%, severely decreased LV and RV function, multiple regional motion abnormalities, s/p LHC revealing TVD, pleural/pericardial effusions, JAMEL, leukocytosis suspected to be in the setting of LE wound on IV ABX, persistent RLE pain w/ RLE Arterial Duplex revealing popliteal artery occlusion  Plan for eventual angiogram with vascular when medically optimized. Pulmonary called to consult as pt with c/o SOB.

## 2025-03-07 NOTE — PROGRESS NOTE ADULT - ASSESSMENT
64y Female with history of CHF presents as a transfer for LE CO2 angiogram. Nephrology consulted for elevated Scr.    1) JAMEL: likely due to ATN and CRS for which patient initiated on RRT on 2/20 with last UF on 3/3 in preparation for LHC on 3/4. Scr increasing likely due to contrast nephropathy. Plan for HD today. Given poor UO and plans for repeat contrast load today, would contact IR for TDC placement next week. On milrinone gtt as per HF. Bladder scan negative. Avoid nephrotoxins.    2) HTN: BP low normal. Can D/C isordil. Monitor BP.    3) LE edema: On bumex 4 mg IV daily however patient anuric. UF with HD. TTE with severely decreased LVSF. Monitor UO.     4) Anemia: Hb improving with low TSAT. No IV iron given elevated ferritin. S/P Epo 8K X 1 dose 3/1. Monitor Hb.    5) Hyponatremia: Secondary to JAMEL and polydipsia. Will correct with HD. Continue with 1L [facility] NEPHROLOGY  Raji Waterman M.D.  ABDI BorregoO.  Kelley Parks M.D.  MD Nancy Kulkarni, MSN, ANP-C    Telephone: [phone]  Facsimile: [phone] [place] [address], [address]  [place] [id number]

## 2025-03-07 NOTE — PROGRESS NOTE ADULT - SUBJECTIVE AND OBJECTIVE BOX
MR#[id number]  PATIENT NAME:COLE ALBA    DATE OF SERVICE: 03-07-25 @ 05:50  Patient was seen and examined by Yordy Gilmore MD on    03-07-25 @ 05:50 .  Interim events noted.Consultant notes ,Labs,Telemetry reviewed by me       HOSPITAL COURSE: HPI:  64F, hx of CHF (last TTE on 1/16/25 EF 30-35%, G2DD), DM, diabetic foot ulcer with a recent admission at [facility] for CHF exacerbation presented as a transfer for worsening R. leg pain and fluid secretion, On non-invasive imaging demonstrating severe depletion of RLE blood flow beyond the popliteal vessel at knee and similar findings in L. Was transferred to [facility] for CO2 angiogram with Dr. Baltazar. (29 Jan 2025 20:05)      INTERIM EVENTS:Patient seen at bedside ,interim events noted.  02/14-Had cath Multivessel CAD started on Milrinone for CTS evaluation albeit targets not optimal  02/15-Awake on Milrinone and Bumex gtt-seen by CTS not a surgical candidate-needs Vascular work-up for LE PAD-scheduled for Angiogram on Wednesday    Weight 170Kg---> 164 Kg--->161.1 Kg---151.2 ---> 155 --> 154.7 ---> 158 --> 148--->147 >148  Kg    02/25-s/p RLE angiogram with pop and AT angioplasty   02/27-Awake had iHD plan for High risk PCI oLM/LAD tomorrow 1000mL removed  Had ? pAF ~~3 secs  02/28-Awake no dyspnea chest pain plan for PCI-oLM/LAD todat HD after PCI-Sinus rhythm  03/01-Had [facility] PCWP-15 still elevated with low BP Plan to dialyse over weekend and plan for PCI on Monday-No dyspnea  03/02-Awake had HD removed 1500Ml no dyspnea Plan for PCI tomorrow  03/03-Awake Sinus rhythm no dyspnea FOR High risk PCI oLM/LAD   03/04-Awake had HD last night-1500mL removed For High risk PCI today  03/05-Awake s/p LHC VERA x 1 to LAD, VERA to LM , POBA to CX via RFA  03/06-Awake no dyspnea chest pain  03/07-Awake poor UOP plan to continue HD    PMH -reviewed admission note, no change since admission  HEART FAILURE: Acute[x ]Chronic[ ] Systolic[x ] Diastolic[ ] Combined Systolic and Diastolic[ ]  CAD[x ] CABG[ ] PCI[ ]  DEVICES[ ] PPM[ ] ICD[ ] ILR[ ]  ATRIAL FIBRILLATION[ ] Paroxysmal[ ] Permanent[ ] CHADS2-[  ]  JAMEL[x ] CKD1[ ] CKD2[ ] CKD3[ ] CKD4[x ] ESRD[ ]  COPD[ ] HTN[x ]   DM[x ] Type1[ ] Type 2[x ]   CVA[ ] Paresis[ ]    AMBULATION: Assisted[x ] Cane/walker[ ] Independent[ ]        MEDICATIONS  (STANDING):  aspirin enteric coated 81 milliGRAM(s) Oral daily  atorvastatin 40 milliGRAM(s) Oral at bedtime  benzocaine/menthol Lozenge 1 Lozenge Oral once  bisacodyl 5 milliGRAM(s) Oral every 12 hours  buMETAnide IVPB 4 milliGRAM(s) IV Intermittent daily  chlorhexidine 4% Liquid 1 Application(s) Topical <User Schedule>  clopidogrel Tablet 75 milliGRAM(s) Oral daily  clopidogrel Tablet      glucagon  Injectable 1 milliGRAM(s) IntraMuscular once  heparin   Injectable 5000 Unit(s) SubCutaneous every 8 hours  insulin glargine Injectable (LANTUS) 13 Unit(s) SubCutaneous at bedtime  insulin lispro (ADMELOG) corrective regimen sliding scale   SubCutaneous three times a day before meals  insulin lispro (ADMELOG) corrective regimen sliding scale   SubCutaneous at bedtime  insulin lispro Injectable (ADMELOG) 5 Unit(s) SubCutaneous three times a day with meals  isosorbide   dinitrate Tablet (ISORDIL) 5 milliGRAM(s) Oral three times a day  milrinone Infusion 0.125 MICROgram(s)/kG/Min (2.7 mL/Hr) IV Continuous <Continuous>  Nephro-rober 1 Tablet(s) Oral daily  pantoprazole  Injectable 40 milliGRAM(s) IV Push daily  polyethylene glycol 3350 17 Gram(s) Oral daily  senna 2 Tablet(s) Oral at bedtime    MEDICATIONS  (PRN):  acetaminophen     Tablet .. 650 milliGRAM(s) Oral every 6 hours PRN Mild Pain (1 - 3)  dextrose Oral Gel 15 Gram(s) Oral once PRN Blood Glucose LESS THAN 70 milliGRAM(s)/deciliter  HYDROmorphone  Injectable 1 milliGRAM(s) IV Push every 6 hours PRN Severe Pain (7 - 10)  melatonin 3 milliGRAM(s) Oral at bedtime PRN Insomnia  ondansetron Injectable 4 milliGRAM(s) IV Push every 6 hours PRN Nausea and/or Vomiting  sodium chloride 0.65% Nasal 1 Spray(s) Both Nostrils three times a day PRN Dryness  sodium chloride 0.9% lock flush 10 milliLiter(s) IV Push every 1 hour PRN Pre/post blood products, medications, blood draw, and to maintain line patency            REVIEW OF SYSTEMS:  Constitutional: [ ] fever, [ ]weight loss,  [x ]fatigue [ ]weight gain  Eyes: [ ] visual changes  Respiratory: [ ]shortness of breath;  [ ] cough, [ ]wheezing, [ ]chills, [ ]hemoptysis  Cardiovascular: [ ] chest pain, [ ]palpitations, [ ]dizziness,  [x ]leg swelling[ ]orthopnea[ ]PND  Gastrointestinal: [ ] abdominal pain, [ ]nausea, [ ]vomiting,  [ ]diarrhea [ ]Constipation [ ]Melena  Genitourinary: [ ] dysuria, [ ] hematuria [ ][place]  Neurologic: [ ] headaches [ ] tremors[ ]weakness [ ]Paralysis Right[ ] Left[ ]  Skin: [ ] itching, [ ]burning, [ ] rashes  Endocrine: [ ] heat or cold intolerance  Musculoskeletal: [ ] joint pain or swelling; [ ] muscle, back, or extremity pain  Psychiatric: [ ] depression, [ ]anxiety, [ ]mood swings, or [ ]difficulty sleeping  Hematologic: [ ] easy bruising, [ ] bleeding gums    [ ] All remaining systems negative except as per above.   [ ]Unable to obtain.  [x] No change in ROS since admission      Vital Signs Last 24 Hrs  T(C): 36.6 (07 Mar 2025 04:29), Max: 37.1 (06 Mar 2025 20:30)  T(F): 97.8 (07 Mar 2025 04:29), Max: 98.7 (06 Mar 2025 20:30)  HR: 112 (07 Mar 2025 04:29) (112 - 116)  BP: 96/60 (07 Mar 2025 04:29) (94/58 - 103/66)  RR: 18 (07 Mar 2025 04:29) (17 - 18)  SpO2: 96% (07 Mar 2025 04:29) (93% - 96%)    Parameters below as of 07 Mar 2025 04:29  Patient On (Oxygen Delivery Method): room air      I&O's Summary    05 Mar 2025 07:01  -  06 Mar 2025 07:00  --------------------------------------------------------  IN: 234.9 mL / OUT: 100 mL / NET: 134.9 mL    06 Mar 2025 07:01  -  07 Mar 2025 05:50  --------------------------------------------------------  IN: 120 mL / OUT: 0 mL / NET: 120 mL        PHYSICAL EXAM:  General: No acute distress BMI-26  HEENT: EOMI, PERRL  Neck: Supple, [ ] JVD  Lungs: Equal air entry bilaterally; [ ] rales [ ] wheezing [ ] rhonchi  Heart: Regular rate and rhythm; [x ] murmur   2/6 [ x] systolic [ ] diastolic [ ] radiation[ ] rubs [ ]  gallops  Abdomen: Nontender, bowel sounds present  Extremities: No clubbing, cyanosis, [x ] edema [x ]Warm, well perfused        RLE: Dressings in place, blistering and ulceration on the dorsal aspect of the foot        LLE: First digit dry gangrene on the plantar aspect  Nervous system:  Alert & Oriented X3, no focal deficits  Psychiatric: Normal affect  Skin: No rashes or lesions    LABS:  03-06    127[L]  |  89[L]  |  45[H]  ----------------------------<  140[H]  3.7   |  22  |  3.63[H]    Ca    8.8      06 Mar 2025 07:14  Mg     1.8     03-06      Creatinine Trend: 3.63<--, 2.35<--, 2.36<--, 2.55<--, 2.23<--, 3.65<--                        9.5    10.51 )-----------( 198      ( 05 Mar 2025 08:39 )             27.9            Cardiac Catheterization (03.04.25 @ 09:07) >  Conclusions:   Impella placed for HR-PCI (pt was turned down for CABG). EF 25%     Severe proximal LCx stenosis s/p successful rotational atherectomy and balloon angioplasty. Severe LM and proximal LAD stenosis s/p successful rotational atherectomy, balloon angioplasty, and VERA x2.  Recommendations:   Triple therapy for one day and then continue Eliquis and Plavix.

## 2025-03-07 NOTE — PROGRESS NOTE ADULT - PROBLEM SELECTOR PLAN 1
-Continue with milrinone 0.125; follow up SCr continues to uptrend after contrast load from [facility] and then again after CTPE Today  -Would continue with milrinone given plan to start HD  -continue to hold spironolactone  -labile BP and afterload reducers held; resume hydralazine 10mg TID and Isordil 5mg TID when able to  -Continue with bumex 4mg IV daily, monitor UO. Likely plan for long term HD as per nephrology recommendations  -check perfusion markers (LFTs, lactate) daily  -not a candidate for advanced therapies given advanced CKD and PAD  -Appreciate nephrology input

## 2025-03-07 NOTE — PROGRESS NOTE ADULT - ASSESSMENT
64F, hx of CHF (last TTE on 1/16/25 EF 30-35%, G2DD), DM, diabetic foot ulcer with a recent admission at [facility] for CHF exacerbation 1/14-1/17 p/w worsening R. leg pain and fluid secretion, was meeting sepsis criteria with podiatry having low suspicion for right cellulitis and admitted for continued management of HF exacerbation and needing ischemic eval.     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  -Left leg Arterial Duplex: Slightly tardus parvus waveform of the left popliteal artery is noted, for which underlying disease cannot be excluded. Posterior tibial artery waveform is nonpulsatile. Anterior tibial artery tardus parvus flow is noted.   Transferred to [facility] for CO2 angiogram as per vascular surgery    #  PAD Wound of lower extremity.   ·  Plan: Podiatry following, b/l serous bullae, no concerns for infection  Found to have RLE coolness  -Right Leg Arterial Duplex: Popliteal artery is occluded with negligible flow of right trifurcation arteries.  -Left leg Arterial Duplex: Slightly tardus parvus waveform of the left popliteal artery is noted, for which underlying disease cannot be excluded. Posterior tibial artery waveform is nonpulsatile. Anterior tibial artery tardus parvus flow is noted.  -Started on heparin gtt and dobutamine gtt -Will transfer to [facility] for CO2 angiogram as per vascular surgery as not candidate for CTA due to worsening SCr  -Keep compression dressing  -LE elevation above heart level at rest.  -Transferred to [facility] for CO2 angiogram   - Overall this patient is at   intermediate  risk (for cardiac death, nonfatal myocardial infarction, and nonfatal cardiac arrest perioperatively for this intermediate  risk procedure).   No cardiac contraindications for CO2 vangiogram  There  are  no further recommendation for risk stratifying imaging/stress testing prior to planned surgery  Seen by Podiatry-No acute pod intervention, local wound care only-   PAD with rest ischemia  s/p AT/Popliteal angioplasty on DAPT        # HFrEF (congestive heart failure). Ischemic Cardiomyopathy  ·  Plan: Hx of HF, previous admission in January, on home Lasix 40 qD, Metoprolol Succ 25 qD. Not on Entresto due to insurance issues  Last TTE: LVSF moderately decreased w/ EF 30-35 %. Moderate G2DD. Mild MR  Stress test: small-sized, moderate defect(s) in the apical wall that is predominantly fixed suggestive of an infarction with minimal marcus-infarct ischemia  Ischemic cardiomyopathy  GDMT on hold 2/2 JAMEL  Repeat TTE EF 20% with WMA RAP~~8  Repeat Limited TTE  Taper off Milrinone and start GDMT        # CAD  LHC-RCA , severe ostial LM disease, diffuse disease in LAD and LCx, some L to R collaterals-seen by CTS advise cMR for viability poor target vessels for CABG-?High risk LM/LAD PCI  Had cMR-LVEF is 25%, greater than 75% subendocardial scarring involving the basal to mid inferior wall of the left ventricle, left ventricular transmural scarring involving the apical septal wall and likely near transmural scarring of the apical lateral wall. 50% scarring of the left ventricular basal inferoseptal wall.  03/05-s/p PCI-LM/LAD   Continue DAPT        # JAMEL on CKD   Creatinine Trend: 3.63 <--2.35 <--2.55 <--2.23<-- 3.01 <--3.39<--2.65<--2.34<--2.12<--, 1.89<- 3.02<--3.38<--(HD)-- 4.76<--4.07<---3.90<-- 1.17  Has had 3 sessions HD for interrmittent HD to allow contrast based procedures is non oliguric  Plan to continue HD

## 2025-03-07 NOTE — PROGRESS NOTE ADULT - SUBJECTIVE AND OBJECTIVE BOX
Seen earlier today     Chief Complaint: Diabetes Mellitus follow up    INTERVAL HX: " Feel good " Tolerating POs, eats full meals. Last 24 hour BGs in 100s at goal with fasting . On Milrinone gtt and Bumex IV. Scheduled to have HD today       Review of Systems:  General: As above  GI: No nausea, vomiting  Endocrine: no  S&Sx of hypoglycemia    Allergies    No Known Allergies    Intolerances    Augmentin (Stomach Upset; Vomiting; Nausea)    MEDICATIONS  (STANDING):  aspirin enteric coated 81 milliGRAM(s) Oral daily  atorvastatin 40 milliGRAM(s) Oral at bedtime  benzocaine/menthol Lozenge 1 Lozenge Oral once  bisacodyl 5 milliGRAM(s) Oral every 12 hours  buMETAnide IVPB 4 milliGRAM(s) IV Intermittent daily  chlorhexidine 4% Liquid 1 Application(s) Topical <User Schedule>  clopidogrel Tablet 75 milliGRAM(s) Oral daily  clopidogrel Tablet      dextrose 5%. 1000 milliLiter(s) (100 mL/Hr) IV Continuous <Continuous>  dextrose 5%. 1000 milliLiter(s) (50 mL/Hr) IV Continuous <Continuous>  dextrose 50% Injectable 25 Gram(s) IV Push once  dextrose 50% Injectable 12.5 Gram(s) IV Push once  dextrose 50% Injectable 25 Gram(s) IV Push once  glucagon  Injectable 1 milliGRAM(s) IntraMuscular once  heparin   Injectable 5000 Unit(s) SubCutaneous every 8 hours  insulin glargine Injectable (LANTUS) 13 Unit(s) SubCutaneous at bedtime  insulin lispro (ADMELOG) corrective regimen sliding scale   SubCutaneous three times a day before meals  insulin lispro (ADMELOG) corrective regimen sliding scale   SubCutaneous at bedtime  insulin lispro Injectable (ADMELOG) 5 Unit(s) SubCutaneous three times a day with meals  milrinone Infusion 0.125 MICROgram(s)/kG/Min (2.7 mL/Hr) IV Continuous <Continuous>  Nephro-rober 1 Tablet(s) Oral daily  pantoprazole  Injectable 40 milliGRAM(s) IV Push daily  polyethylene glycol 3350 17 Gram(s) Oral daily  senna 2 Tablet(s) Oral at bedtime      atorvastatin   40 milliGRAM(s) Oral (03-06-25 @ 20:38)    insulin glargine Injectable (LANTUS)   13 Unit(s) SubCutaneous (03-06-25 @ 21:46)    insulin lispro (ADMELOG) corrective regimen sliding scale   1 Unit(s) SubCutaneous (03-07-25 @ 12:29)    insulin lispro Injectable (ADMELOG)   5 Unit(s) SubCutaneous (03-07-25 @ 12:29)   5 Unit(s) SubCutaneous (03-07-25 @ 08:14)   5 Unit(s) SubCutaneous (03-06-25 @ 16:24)        PHYSICAL EXAM:  VITALS: T(C): 36.5 (03-07-25 @ 11:28)  T(F): 97.7 (03-07-25 @ 11:28), Max: 98.7 (03-06-25 @ 20:30)  HR: 120 (03-07-25 @ 11:28) (112 - 120)  BP: 101/66 (03-07-25 @ 11:28) (96/60 - 103/66)  RR:  (18 - 18)  SpO2:  (96% - 96%)  Wt(kg): --  GENERAL: Female sitting in bed,in NAD  Respiratory: Respirations unlabored   Extremities: Warm, B/L leg ACE wrap intact   NEURO: Alert , appropriate     LABS:  POCT Blood Glucose.: 167 mg/dL (03-07-25 @ 12:10)  POCT Blood Glucose.: 104 mg/dL (03-07-25 @ 07:41)  POCT Blood Glucose.: 102 mg/dL (03-06-25 @ 21:42)  POCT Blood Glucose.: 130 mg/dL (03-06-25 @ 16:06)  POCT Blood Glucose.: 174 mg/dL (03-06-25 @ 12:04)  POCT Blood Glucose.: 178 mg/dL (03-06-25 @ 08:25)  POCT Blood Glucose.: 141 mg/dL (03-05-25 @ 21:14)  POCT Blood Glucose.: 152 mg/dL (03-05-25 @ 17:05)  POCT Blood Glucose.: 172 mg/dL (03-05-25 @ 12:37)  POCT Blood Glucose.: 174 mg/dL (03-05-25 @ 12:19)  POCT Blood Glucose.: 206 mg/dL (03-05-25 @ 08:25)  POCT Blood Glucose.: 146 mg/dL (03-04-25 @ 21:21)  POCT Blood Glucose.: 187 mg/dL (03-04-25 @ 17:32)                          9.5    10.51 )-----------( 198      ( 05 Mar 2025 08:39 )             27.9     03-07    126[L]  |  86[L]  |  53[H]  ----------------------------<  121[H]  4.5   |  21[L]  |  4.46[H]    Ca    9.1      07 Mar 2025 09:24  Phos  4.8     03-07  Mg     2.1     03-07      eGFR: 11 mL/min/1.73m2 (07 Mar 2025 09:24)    01-24 Chol 122 Direct LDL -- LDL calculated 66 HDL 39[L] Trig 89  Thyroid Function Tests:      A1C with Estimated Average Glucose Result: 11.6 % (01-24-25 @ 05:40)  A1C with Estimated Average Glucose Result: >15.5 % (12-13-24 @ 06:49)    Estimated Average Glucose: 286 mg/dL (01-24-25 @ 05:40)  Estimated Average Glucose: >398 mg/dL (12-13-24 @ 06:49)        Diet, Regular:   Consistent Carbohydrate No Snacks (CSTCHO)  1000mL Fluid Restriction ([id number])  Low Sodium  No Concentrated Phosphorus  Halal  Lacto Veg (Accepts Milk & Milk Products)  Supplement Feeding Modality:  Oral  Nepro Cans or Servings Per Day:  1       Frequency:  Daily (03-06-25 @ 15:14) [Active]

## 2025-03-07 NOTE — PROGRESS NOTE ADULT - SUBJECTIVE AND OBJECTIVE BOX
Follow-up Pulm Progress Note    No new respiratory events overnight.  Denies SOB/CP.     Medications:  MEDICATIONS  (STANDING):  aspirin enteric coated 81 milliGRAM(s) Oral daily  atorvastatin 40 milliGRAM(s) Oral at bedtime  benzocaine/menthol Lozenge 1 Lozenge Oral once  bisacodyl 5 milliGRAM(s) Oral every 12 hours  buMETAnide IVPB 4 milliGRAM(s) IV Intermittent daily  chlorhexidine 4% Liquid 1 Application(s) Topical <User Schedule>  clopidogrel Tablet 75 milliGRAM(s) Oral daily  clopidogrel Tablet      dextrose 5%. 1000 milliLiter(s) (100 mL/Hr) IV Continuous <Continuous>  dextrose 5%. 1000 milliLiter(s) (50 mL/Hr) IV Continuous <Continuous>  dextrose 50% Injectable 25 Gram(s) IV Push once  dextrose 50% Injectable 12.5 Gram(s) IV Push once  dextrose 50% Injectable 25 Gram(s) IV Push once  glucagon  Injectable 1 milliGRAM(s) IntraMuscular once  heparin   Injectable 5000 Unit(s) SubCutaneous every 8 hours  insulin glargine Injectable (LANTUS) 13 Unit(s) SubCutaneous at bedtime  insulin lispro (ADMELOG) corrective regimen sliding scale   SubCutaneous three times a day before meals  insulin lispro (ADMELOG) corrective regimen sliding scale   SubCutaneous at bedtime  insulin lispro Injectable (ADMELOG) 5 Unit(s) SubCutaneous three times a day with meals  midodrine. 10 milliGRAM(s) Oral once  milrinone Infusion 0.125 MICROgram(s)/kG/Min (2.7 mL/Hr) IV Continuous <Continuous>  Nephro-rober 1 Tablet(s) Oral daily  pantoprazole  Injectable 40 milliGRAM(s) IV Push daily  polyethylene glycol 3350 17 Gram(s) Oral daily  senna 2 Tablet(s) Oral at bedtime    MEDICATIONS  (PRN):  acetaminophen     Tablet .. 650 milliGRAM(s) Oral every 6 hours PRN Mild Pain (1 - 3)  dextrose Oral Gel 15 Gram(s) Oral once PRN Blood Glucose LESS THAN 70 milliGRAM(s)/deciliter  HYDROmorphone  Injectable 1 milliGRAM(s) IV Push every 6 hours PRN Severe Pain (7 - 10)  melatonin 3 milliGRAM(s) Oral at bedtime PRN Insomnia  ondansetron Injectable 4 milliGRAM(s) IV Push every 6 hours PRN Nausea and/or Vomiting  sodium chloride 0.65% Nasal 1 Spray(s) Both Nostrils three times a day PRN Dryness  sodium chloride 0.9% lock flush 10 milliLiter(s) IV Push every 1 hour PRN Pre/post blood products, medications, blood draw, and to maintain line patency          Vital Signs Last 24 Hrs  T(C): 36.5 (07 Mar 2025 11:28), Max: 37.1 (06 Mar 2025 20:30)  T(F): 97.7 (07 Mar 2025 11:28), Max: 98.7 (06 Mar 2025 20:30)  HR: 120 (07 Mar 2025 11:28) (112 - 120)  BP: 101/66 (07 Mar 2025 11:28) (94/58 - 103/66)  BP(mean): --  RR: 18 (07 Mar 2025 11:28) (17 - 18)  SpO2: 96% (07 Mar 2025 11:28) (93% - 96%)    Parameters below as of 07 Mar 2025 11:28  Patient On (Oxygen Delivery Method): room air              03-06 @ 07:01  -  03-07 @ 07:00  --------------------------------------------------------  IN: 120 mL / OUT: 0 mL / NET: 120 mL          LABS:    03-07    126[L]  |  86[L]  |  53[H]  ----------------------------<  121[H]  4.5   |  21[L]  |  4.46[H]    Ca    9.1      07 Mar 2025 09:24  Phos  4.8     03-07  Mg     2.1     03-07            CAPILLARY BLOOD GLUCOSE      POCT Blood Glucose.: 167 mg/dL (07 Mar 2025 12:10)      Urinalysis Basic - ( 07 Mar 2025 09:24 )    Color: x / Appearance: x / SG: x / pH: x  Gluc: 121 mg/dL / Ketone: x  / Bili: x / Urobili: x   Blood: x / Protein: x / Nitrite: x   Leuk Esterase: x / RBC: x / WBC x   Sq Epi: x / Non Sq Epi: x / Bacteria: x                    Physical Examination:  PULM: grossly clear b/l   CVS: S1, S2 heard    RADIOLOGY REVIEWED  CTA chest: no central PE  b/l pl effusions R>L  congestion  f/u official report

## 2025-03-07 NOTE — PROGRESS NOTE ADULT - SUBJECTIVE AND OBJECTIVE BOX
[facility] NEPHROLOGY- PROGRESS NOTE    64y Female with history of CHF presents as a transfer for LE CO2 angiogram. Nephrology consulted for elevated Scr.      REVIEW OF SYSTEMS:  Gen: no fevers  Cards: no chest pain  Resp: no dyspnea  GI: no nausea or vomiting or diarrhea  Vascular: + LE edema improving    Augmentin (Stomach Upset; Vomiting; Nausea)  No Known Allergies      Hospital Medications: Medications reviewed        VITALS:  T(F): 97.7 (03-07-25 @ 11:28), Max: 98.7 (03-06-25 @ 20:30)  HR: 120 (03-07-25 @ 11:28)  BP: 101/66 (03-07-25 @ 11:28)  RR: 18 (03-07-25 @ 11:28)  SpO2: 96% (03-07-25 @ 11:28)  Wt(kg): --    03-06 @ 07:01  -  03-07 @ 07:00  --------------------------------------------------------  IN: 120 mL / OUT: 0 mL / NET: 120 mL        PHYSICAL EXAM:    Gen: NAD, calm  Cards: RRR, +S1/S2, no M/G/R  Resp: CTA B/L  GI: soft, NT/ND, NABS  : no stiles  Vascular: + LE wrapped B/L with RLE edema > LLE edema, + RIJ subclavian        LABS:  03-07    126[L]  |  86[L]  |  53[H]  ----------------------------<  121[H]  4.5   |  21[L]  |  4.46[H]    Ca    9.1      07 Mar 2025 09:24  Phos  4.8     03-07  Mg     2.1     03-07      Creatinine Trend: 4.46 <--, 3.63 <--, 2.35 <--, 2.36 <--, 2.55 <--, 2.23 <--, 3.65 <--    Urine Studies:  Urinalysis Basic - ( 07 Mar 2025 09:24 )    Color:  / Appearance:  / SG:  / pH:   Gluc: 121 mg/dL / Ketone:   / Bili:  / Urobili:    Blood:  / Protein:  / Nitrite:    Leuk Esterase:  / RBC:  / WBC    Sq Epi:  / Non Sq Epi:  / Bacteria:             < from: Xray Chest 1 View- PORTABLE-Urgent (Xray Chest 1 View- PORTABLE-Urgent .) (03.07.25 @ 11:32) >  IMPRESSION: Trace bilateral pleural effusions and mild-to-moderate   pulmonary edema.    --- End of Report ---    < end of copied text >

## 2025-03-07 NOTE — PROGRESS NOTE ADULT - NS ATTEND OPT1A GEN_ALL_CORE
Medical decision making
History/Exam/Medical decision making
Medical decision making

## 2025-03-07 NOTE — PROGRESS NOTE ADULT - SUBJECTIVE AND OBJECTIVE BOX
INTERNAL MEDICINE PROGRESS NOTE     NAME OF PATIENT: TOMASZ ALBA  MRN: [id number]  DATE OF VISIT: 03-07-25 @ 09:21    SUBJECTIVE/ ROS:  - Patient seen and examined by bedside     OBJECTIVE:  ICU Vital Signs Last 24 Hrs  T(C): 36.6 (07 Mar 2025 04:29), Max: 37.1 (06 Mar 2025 20:30)  T(F): 97.8 (07 Mar 2025 04:29), Max: 98.7 (06 Mar 2025 20:30)  HR: 112 (07 Mar 2025 04:29) (112 - 116)  BP: 96/60 (07 Mar 2025 04:29) (94/58 - 103/66)  BP(mean): --  ABP: --  ABP(mean): --  RR: 18 (07 Mar 2025 04:29) (17 - 18)  SpO2: 96% (07 Mar 2025 04:29) (93% - 96%)    O2 Parameters below as of 07 Mar 2025 04:29  Patient On (Oxygen Delivery Method): room air          03-07-25 @ 09:21  T(C): 36.6 (03-07-25 @ 04:29), Max: 37.1 (03-06-25 @ 20:30)  HR: 112 (03-07-25 @ 04:29) (112 - 116)  BP: 96/60 (03-07-25 @ 04:29) (94/58 - 103/66)  RR: 18 (03-07-25 @ 04:29) (17 - 18)  SpO2: 96% (03-07-25 @ 04:29) (93% - 96%)  Wt(kg): --  CAPILLARY BLOOD GLUCOSE      POCT Blood Glucose.: 104 mg/dL (07 Mar 2025 07:41)      HOSPITAL MEDICATIONS:  MEDICATIONS  (STANDING):  aspirin enteric coated 81 milliGRAM(s) Oral daily  atorvastatin 40 milliGRAM(s) Oral at bedtime  benzocaine/menthol Lozenge 1 Lozenge Oral once  bisacodyl 5 milliGRAM(s) Oral every 12 hours  buMETAnide IVPB 4 milliGRAM(s) IV Intermittent daily  chlorhexidine 4% Liquid 1 Application(s) Topical <User Schedule>  clopidogrel Tablet 75 milliGRAM(s) Oral daily  clopidogrel Tablet      dextrose 5%. 1000 milliLiter(s) (100 mL/Hr) IV Continuous <Continuous>  dextrose 5%. 1000 milliLiter(s) (50 mL/Hr) IV Continuous <Continuous>  dextrose 50% Injectable 25 Gram(s) IV Push once  dextrose 50% Injectable 12.5 Gram(s) IV Push once  dextrose 50% Injectable 25 Gram(s) IV Push once  glucagon  Injectable 1 milliGRAM(s) IntraMuscular once  heparin   Injectable 5000 Unit(s) SubCutaneous every 8 hours  insulin glargine Injectable (LANTUS) 13 Unit(s) SubCutaneous at bedtime  insulin lispro (ADMELOG) corrective regimen sliding scale   SubCutaneous three times a day before meals  insulin lispro (ADMELOG) corrective regimen sliding scale   SubCutaneous at bedtime  insulin lispro Injectable (ADMELOG) 5 Unit(s) SubCutaneous three times a day with meals  isosorbide   dinitrate Tablet (ISORDIL) 5 milliGRAM(s) Oral three times a day  milrinone Infusion 0.125 MICROgram(s)/kG/Min (2.7 mL/Hr) IV Continuous <Continuous>  Nephro-rober 1 Tablet(s) Oral daily  pantoprazole  Injectable 40 milliGRAM(s) IV Push daily  polyethylene glycol 3350 17 Gram(s) Oral daily  senna 2 Tablet(s) Oral at bedtime    MEDICATIONS  (PRN):  acetaminophen     Tablet .. 650 milliGRAM(s) Oral every 6 hours PRN Mild Pain (1 - 3)  dextrose Oral Gel 15 Gram(s) Oral once PRN Blood Glucose LESS THAN 70 milliGRAM(s)/deciliter  HYDROmorphone  Injectable 1 milliGRAM(s) IV Push every 6 hours PRN Severe Pain (7 - 10)  melatonin 3 milliGRAM(s) Oral at bedtime PRN Insomnia  ondansetron Injectable 4 milliGRAM(s) IV Push every 6 hours PRN Nausea and/or Vomiting  sodium chloride 0.65% Nasal 1 Spray(s) Both Nostrils three times a day PRN Dryness  sodium chloride 0.9% lock flush 10 milliLiter(s) IV Push every 1 hour PRN Pre/post blood products, medications, blood draw, and to maintain line patency      PHYSICAL EXAMINATION:  General: NAD   Cardiology: S1/S2 with no murmur   Respiratory: CTA BL with no wheeze   GI: Soft and NTND  Extremities: JOSE L of BL UE/LE   Neurology: Awake with no acute neurological deficits     LABS:    03-06    127[L]  |  89[L]  |  45[H]  ----------------------------<  140[H]  3.7   |  22  |  3.63[H]    Ca    8.8      06 Mar 2025 07:14  Mg     1.8     03-06            MICROBIOLOGY:     RADIOLOGY:    CARDIOLOGY:           INTERNAL MEDICINE PROGRESS NOTE     NAME OF PATIENT: TOMASZ ALBA  MRN: [id number]  DATE OF VISIT: 03-07-25 @ 09:21    SUBJECTIVE/ ROS:  - Patient seen and examined by bedside   - Remains anuric   - Plan to resume on HD today     OBJECTIVE:  ICU Vital Signs Last 24 Hrs  T(C): 36.6 (07 Mar 2025 04:29), Max: 37.1 (06 Mar 2025 20:30)  T(F): 97.8 (07 Mar 2025 04:29), Max: 98.7 (06 Mar 2025 20:30)  HR: 112 (07 Mar 2025 04:29) (112 - 116)  BP: 96/60 (07 Mar 2025 04:29) (94/58 - 103/66)  BP(mean): --  ABP: --  ABP(mean): --  RR: 18 (07 Mar 2025 04:29) (17 - 18)  SpO2: 96% (07 Mar 2025 04:29) (93% - 96%)    O2 Parameters below as of 07 Mar 2025 04:29  Patient On (Oxygen Delivery Method): room air          03-07-25 @ 09:21  T(C): 36.6 (03-07-25 @ 04:29), Max: 37.1 (03-06-25 @ 20:30)  HR: 112 (03-07-25 @ 04:29) (112 - 116)  BP: 96/60 (03-07-25 @ 04:29) (94/58 - 103/66)  RR: 18 (03-07-25 @ 04:29) (17 - 18)  SpO2: 96% (03-07-25 @ 04:29) (93% - 96%)  Wt(kg): --  CAPILLARY BLOOD GLUCOSE      POCT Blood Glucose.: 104 mg/dL (07 Mar 2025 07:41)      HOSPITAL MEDICATIONS:  MEDICATIONS  (STANDING):  aspirin enteric coated 81 milliGRAM(s) Oral daily  atorvastatin 40 milliGRAM(s) Oral at bedtime  benzocaine/menthol Lozenge 1 Lozenge Oral once  bisacodyl 5 milliGRAM(s) Oral every 12 hours  buMETAnide IVPB 4 milliGRAM(s) IV Intermittent daily  chlorhexidine 4% Liquid 1 Application(s) Topical <User Schedule>  clopidogrel Tablet 75 milliGRAM(s) Oral daily  clopidogrel Tablet      dextrose 5%. 1000 milliLiter(s) (100 mL/Hr) IV Continuous <Continuous>  dextrose 5%. 1000 milliLiter(s) (50 mL/Hr) IV Continuous <Continuous>  dextrose 50% Injectable 25 Gram(s) IV Push once  dextrose 50% Injectable 12.5 Gram(s) IV Push once  dextrose 50% Injectable 25 Gram(s) IV Push once  glucagon  Injectable 1 milliGRAM(s) IntraMuscular once  heparin   Injectable 5000 Unit(s) SubCutaneous every 8 hours  insulin glargine Injectable (LANTUS) 13 Unit(s) SubCutaneous at bedtime  insulin lispro (ADMELOG) corrective regimen sliding scale   SubCutaneous three times a day before meals  insulin lispro (ADMELOG) corrective regimen sliding scale   SubCutaneous at bedtime  insulin lispro Injectable (ADMELOG) 5 Unit(s) SubCutaneous three times a day with meals  isosorbide   dinitrate Tablet (ISORDIL) 5 milliGRAM(s) Oral three times a day  milrinone Infusion 0.125 MICROgram(s)/kG/Min (2.7 mL/Hr) IV Continuous <Continuous>  Nephro-rober 1 Tablet(s) Oral daily  pantoprazole  Injectable 40 milliGRAM(s) IV Push daily  polyethylene glycol 3350 17 Gram(s) Oral daily  senna 2 Tablet(s) Oral at bedtime    MEDICATIONS  (PRN):  acetaminophen     Tablet .. 650 milliGRAM(s) Oral every 6 hours PRN Mild Pain (1 - 3)  dextrose Oral Gel 15 Gram(s) Oral once PRN Blood Glucose LESS THAN 70 milliGRAM(s)/deciliter  HYDROmorphone  Injectable 1 milliGRAM(s) IV Push every 6 hours PRN Severe Pain (7 - 10)  melatonin 3 milliGRAM(s) Oral at bedtime PRN Insomnia  ondansetron Injectable 4 milliGRAM(s) IV Push every 6 hours PRN Nausea and/or Vomiting  sodium chloride 0.65% Nasal 1 Spray(s) Both Nostrils three times a day PRN Dryness  sodium chloride 0.9% lock flush 10 milliLiter(s) IV Push every 1 hour PRN Pre/post blood products, medications, blood draw, and to maintain line patency      PHYSICAL EXAMINATION:  General: NAD   Cardiology: S1/S2 with no murmur   Respiratory: CTA BL with no wheeze   GI: Soft and NTND  Extremities: JOSE L of BL UE/LE. BL LE 1+ pitting edema.   Neurology: Awake with no acute neurological deficits     LABS:    03-06    127[L]  |  89[L]  |  45[H]  ----------------------------<  140[H]  3.7   |  22  |  3.63[H]    Ca    8.8      06 Mar 2025 07:14  Mg     1.8     03-06            MICROBIOLOGY:     RADIOLOGY:    CARDIOLOGY:           INTERNAL MEDICINE PROGRESS NOTE     NAME OF PATIENT: TOMASZ ALBA  MRN: [id number]  DATE OF VISIT: 03-07-25 @ 09:21    SUBJECTIVE/ ROS:  - Patient seen and examined by bedside   - Remains anuric   - Plan to resume on HD today   - Pending shiley to permacath conversion     OBJECTIVE:  ICU Vital Signs Last 24 Hrs  T(C): 36.6 (07 Mar 2025 04:29), Max: 37.1 (06 Mar 2025 20:30)  T(F): 97.8 (07 Mar 2025 04:29), Max: 98.7 (06 Mar 2025 20:30)  HR: 112 (07 Mar 2025 04:29) (112 - 116)  BP: 96/60 (07 Mar 2025 04:29) (94/58 - 103/66)  BP(mean): --  ABP: --  ABP(mean): --  RR: 18 (07 Mar 2025 04:29) (17 - 18)  SpO2: 96% (07 Mar 2025 04:29) (93% - 96%)    O2 Parameters below as of 07 Mar 2025 04:29  Patient On (Oxygen Delivery Method): room air          03-07-25 @ 09:21  T(C): 36.6 (03-07-25 @ 04:29), Max: 37.1 (03-06-25 @ 20:30)  HR: 112 (03-07-25 @ 04:29) (112 - 116)  BP: 96/60 (03-07-25 @ 04:29) (94/58 - 103/66)  RR: 18 (03-07-25 @ 04:29) (17 - 18)  SpO2: 96% (03-07-25 @ 04:29) (93% - 96%)  Wt(kg): --  CAPILLARY BLOOD GLUCOSE      POCT Blood Glucose.: 104 mg/dL (07 Mar 2025 07:41)      HOSPITAL MEDICATIONS:  MEDICATIONS  (STANDING):  aspirin enteric coated 81 milliGRAM(s) Oral daily  atorvastatin 40 milliGRAM(s) Oral at bedtime  benzocaine/menthol Lozenge 1 Lozenge Oral once  bisacodyl 5 milliGRAM(s) Oral every 12 hours  buMETAnide IVPB 4 milliGRAM(s) IV Intermittent daily  chlorhexidine 4% Liquid 1 Application(s) Topical <User Schedule>  clopidogrel Tablet 75 milliGRAM(s) Oral daily  clopidogrel Tablet      dextrose 5%. 1000 milliLiter(s) (100 mL/Hr) IV Continuous <Continuous>  dextrose 5%. 1000 milliLiter(s) (50 mL/Hr) IV Continuous <Continuous>  dextrose 50% Injectable 25 Gram(s) IV Push once  dextrose 50% Injectable 12.5 Gram(s) IV Push once  dextrose 50% Injectable 25 Gram(s) IV Push once  glucagon  Injectable 1 milliGRAM(s) IntraMuscular once  heparin   Injectable 5000 Unit(s) SubCutaneous every 8 hours  insulin glargine Injectable (LANTUS) 13 Unit(s) SubCutaneous at bedtime  insulin lispro (ADMELOG) corrective regimen sliding scale   SubCutaneous three times a day before meals  insulin lispro (ADMELOG) corrective regimen sliding scale   SubCutaneous at bedtime  insulin lispro Injectable (ADMELOG) 5 Unit(s) SubCutaneous three times a day with meals  isosorbide   dinitrate Tablet (ISORDIL) 5 milliGRAM(s) Oral three times a day  milrinone Infusion 0.125 MICROgram(s)/kG/Min (2.7 mL/Hr) IV Continuous <Continuous>  Nephro-rober 1 Tablet(s) Oral daily  pantoprazole  Injectable 40 milliGRAM(s) IV Push daily  polyethylene glycol 3350 17 Gram(s) Oral daily  senna 2 Tablet(s) Oral at bedtime    MEDICATIONS  (PRN):  acetaminophen     Tablet .. 650 milliGRAM(s) Oral every 6 hours PRN Mild Pain (1 - 3)  dextrose Oral Gel 15 Gram(s) Oral once PRN Blood Glucose LESS THAN 70 milliGRAM(s)/deciliter  HYDROmorphone  Injectable 1 milliGRAM(s) IV Push every 6 hours PRN Severe Pain (7 - 10)  melatonin 3 milliGRAM(s) Oral at bedtime PRN Insomnia  ondansetron Injectable 4 milliGRAM(s) IV Push every 6 hours PRN Nausea and/or Vomiting  sodium chloride 0.65% Nasal 1 Spray(s) Both Nostrils three times a day PRN Dryness  sodium chloride 0.9% lock flush 10 milliLiter(s) IV Push every 1 hour PRN Pre/post blood products, medications, blood draw, and to maintain line patency      PHYSICAL EXAMINATION:  General: NAD   Cardiology: S1/S2 with no murmur   Respiratory: CTA BL with no wheeze   GI: Soft and NTND  Extremities: JOSE L of BL UE/LE. BL LE 1+ pitting edema.   Neurology: Awake with no acute neurological deficits     LABS:    03-06    127[L]  |  89[L]  |  45[H]  ----------------------------<  140[H]  3.7   |  22  |  3.63[H]    Ca    8.8      06 Mar 2025 07:14  Mg     1.8     03-06            MICROBIOLOGY:     RADIOLOGY:    CARDIOLOGY:

## 2025-03-07 NOTE — PROGRESS NOTE ADULT - PROBLEM SELECTOR PLAN 1
-Primarily with exertion & when laying flat. Pt denies SOB at rest.  -Suspect 2nd to fluid overload, underlying CHF  -Keep O>I as tolerated  -VBG 2/14 acceptable  -ABX per primary team for LE wounds  -Keep sats >90% with O2 PRN (currently RA).  -SOB and cough improved  -Incentive spirometry use encouraged.  -CTA chest with b/l pl effusions R>L, congestion, no central PE. F/u official report.

## 2025-03-07 NOTE — CONSULT NOTE ADULT - SUBJECTIVE AND OBJECTIVE BOX
Interventional Radiology    Evaluate for Procedure:     HPI: 64y Female with history of CHF presents as a transfer for LE CO2 angiogram with elevated Scr. Patient initiated on HD via Non tunneled HD catheter placed on 2/20. IR now consulted for tunneled HD catheter placement.      Allergies: No Known Allergies    Medications (Abx/Cardiac/Anticoagulation/Blood Products)  aspirin enteric coated: 81 milliGRAM(s) Oral (03-07 @ 11:46)  buMETAnide IVPB: 132 mL/Hr IV Intermittent (03-07 @ 05:47)  clopidogrel Tablet: 75 milliGRAM(s) Oral (03-07 @ 11:47)  heparin   Injectable: 5000 Unit(s) SubCutaneous (03-07 @ 13:01)  isosorbide   dinitrate Tablet (ISORDIL): 5 milliGRAM(s) Oral (03-07 @ 05:47)  midodrine.: 10 milliGRAM(s) Oral (03-07 @ 13:04)  milrinone Infusion: 2.7 mL/Hr IV Continuous (03-06 @ 08:19)  milrinone Infusion: 5.41 mL/Hr IV Continuous (03-05 @ 20:31)    Data:    T(C): 36.5  HR: 120  BP: 101/66  RR: 18  SpO2: 96%    -WBC 10.51 / HgB 9.5 / Hct 27.9 / Plt 198  -Na 126 / Cl 86 / BUN 53 / Glucose 121  -K 4.5 / CO2 21 / Cr 4.46  -ALT -- / Alk Phos -- / T.Bili --  -INR 1.29 / PTT 31.0    Radiology:     Assessment/Plan:   64y Female with history of CHF presents as a transfer for LE CO2 angiogram with elevated Scr. Patient initiated on HD via Non tunneled HD catheter placed on 2/20. IR now consulted for tunneled HD catheter placement.       - case reviewed and approved for ____  - please place IR procedure order under ______  - STAT labs in AM (cbc,coags, bmp, T&S)  - hold AC x___hrs prior to procedure with tentative plan to resume AC __ hours post procedure, if no concern for bleeding  - NPO on ____ at 11pm  - d/w primary team Interventional Radiology    Evaluate for Procedure:     HPI: 64y Female with history of CHF presents as a transfer for LE CO2 angiogram with elevated Scr. Patient initiated on HD via Non tunneled HD catheter placed on 2/20. IR now consulted for tunneled HD catheter placement.      Allergies: No Known Allergies    Medications (Abx/Cardiac/Anticoagulation/Blood Products)  aspirin enteric coated: 81 milliGRAM(s) Oral (03-07 @ 11:46)  buMETAnide IVPB: 132 mL/Hr IV Intermittent (03-07 @ 05:47)  clopidogrel Tablet: 75 milliGRAM(s) Oral (03-07 @ 11:47)  heparin   Injectable: 5000 Unit(s) SubCutaneous (03-07 @ 13:01)  isosorbide   dinitrate Tablet (ISORDIL): 5 milliGRAM(s) Oral (03-07 @ 05:47)  midodrine.: 10 milliGRAM(s) Oral (03-07 @ 13:04)  milrinone Infusion: 2.7 mL/Hr IV Continuous (03-06 @ 08:19)  milrinone Infusion: 5.41 mL/Hr IV Continuous (03-05 @ 20:31)    Data:    T(C): 36.5  HR: 120  BP: 101/66  RR: 18  SpO2: 96%    -WBC 10.51 / HgB 9.5 / Hct 27.9 / Plt 198  -Na 126 / Cl 86 / BUN 53 / Glucose 121  -K 4.5 / CO2 21 / Cr 4.46  -ALT -- / Alk Phos -- / T.Bili --  -INR 1.29 / PTT 31.0    Radiology:     Assessment/Plan:   64y Female with history of CHF presents as a transfer for LE CO2 angiogram with elevated Scr. Patient initiated on HD via Non tunneled HD catheter placed on 2/20. IR now consulted for tunneled HD catheter placement.       - case reviewed and approved for conversion of non tunneled to tunneled HD catheter on Tuesday 3/11  - please place IR procedure order under JAN[facility] Lizarraga (free text)   - STAT labs in AM (cbc,coags, bmp, T&S)  - hold HSQ x 6 hrs prior to procedure with tentative plan to resume AC 6 hours post procedure, if no concern for bleeding  - NPO on 3/10 at 11pm  - d/w primary team

## 2025-03-07 NOTE — PROGRESS NOTE ADULT - PROBLEM SELECTOR PLAN 1
Inpatient Plan:  - Check BG TID AC and HS while on PO diet  - Fasting BG tightly controlled, will slightly decrease Lantus to 12 u QHS  - C/w Admelog 5u TID AC (HOLD if NPO)  - C/w low dose Admelog correctional scales TID AC and HS  - please keep hypoglycemia protocol in palce     Discharge Planning:   - Likely basal/bolus regimen given kidney function (doses TBD closer to d/c). Not a candidate for SGLT2 due to leg ulcers and also significantly decreased EGFR., can consider GLP1 in addition to Basal/bolus> will need close f/u with Optho due to h/o retinopathy.   Can send Rx for ozempic 0.25mg weekly x 4weeks and increase to 0.50mg, or mounjaro 2.5mg 2.5mg x4 weeks, then increase to 5.0mg weekly. (Patient denies medullary thyroid cancer, hx of pancreatitis or MEN2)  - Please make sure patient has DM management supplies (glucometer, lancets, strips, alcohol pads, insulin pen needles).  - Patient should check BGs before meals and at bedtime. Contact PCP/endocrinology if BG is less than 70 X1, greater than  400 X1 or persistently greater than 200s.   - Endocrine follow up: can f/u with Dr. Grace, Endocrinology.   - Needs Optho/ renal/cardiac /podiatry and vascular follow up

## 2025-03-07 NOTE — PROGRESS NOTE ADULT - ASSESSMENT
65 YO F with PMHx of HFrEF 30-35 and grade 2 ddfxn (last TTE on 01/16/25), DM2, and diabetic foot ulcer. Recent admission at [facility] for ADHF. Patient now represents to OSH and ultimately to [facility] as a transfer for worsening right leg pain and fluid secretion. As per documentation, patient was reported to have severe depletion of RLE blood flow beyond the popliteal vessel at knee and similar findings in the left. Patient was transferred to [facility] for CO2 angiogram with Dr. Baltazar. Of note, patient also with reported visual disturbance for which patient was seen and evaluated by opthalmology. Internal Medicine has been consulted on Ms. Kirby's care for medical management.     # ADHF/ BiV HFrEF 20   - Recent admission at [facility] for ADHF and on dobutamine outpatient  - TTE 1/16 with EF 30-35 with moderately reduced LVSF and grade 2 ddfxn, normal RVSF , trace TR/ MD, and trace pericardial effusion  - RPT TTE with EF 20, severely decreased LV, decreased RVSF with TAPSE 1.2, and regional wall motion abnormalities present with entire septum, entire apex, entire inferior wall, mid inferolateral segment, and mid anterolateral segment are hypokinetic.   - RHC with RA 20, PA 49/29 (40), PCWP 35, mVO2 51.1, CO/CI 3.8/2.2, SVR 1095 w/ Multivessel CAD   - s/p zaroxyln 10 QD to augment diuresis   - s/p bumex GTT   - s/p HTS to augment diuresis   - RPT limited TTE w/ LVSF severely decreased, reduced RVSF, LVOT VTI 12, mild to mod TR, and mildly elevated right atrial pressure  - Case discussed with EP and needs to be on GDMT for 3 months before AICD placement and should be stabilized off of inotropic support.   - Continues on milrinone gtt as per Cardio   - Continue on hydral 25 and isordil 5, however BP running soft and hydral turned off. IF BP continues to run soft may need to hold isordil as well.   - Pending RPT ECHO  - HF and cards following and adjusting medications   - Monitor I and O, diuresis per Cardio/ Renal    - Monitor volume status   - Check daily weights    - Monitor on telemetry; Monitor electrolytes   - Cardio, HF, Renal, and EP evals appreciated; F/u recs     # Tachycardia and Hypoxia  - Tachycardiac and on RA with SPO2 running 90-92   - Could be second to low cardiac output   - CT Angio ordered to rule PE  - TTE reordered to reassess VTI  - Monitor closely on Tele    # CAD with TVD   - NST abnormal with small-sized, moderate defect in apical wall that is predominantly fixed suggestive of an infarction with minimal marcus-infarct ischemia. Post stress LVEF 25.  - s/p LHC with RCA , severe ostial LM disease, diffuse disease in LAD and LCx, some L to R collaterals (Multivessel CAD)  - Case discussed with CTSx and NOT a candidate for CABG due to comorbidities  - Cardiac MRI with greater than 75% subendocardial scarring of the basal to mid-inferior wall of the LV and 50% scarring of the left ventricular basal inferoseptal wall. There is also left ventricular transmural scarring involving the apical septal wall and likely near transmural scarring of the apical lateral wall.  - s/p RPT LHC 3/4 with LM 80 s/p POBA/ VERA with LM 1, pLAD 90 s/p POBA/ VERA with pLAD 1, and Cx 80 s/p POBA with Cx 10.   - Continue on DAPT and Statin   - Pending RPT ECHO  - IC, Cards and HF following     # BL LE Edema likely in setting of ADHF  - Remove volume as per Cardio, HF and Renal   - BL LE elevation and compression  - LE Duplex neg   - Monitor for now    # Pericardial effusion likely in setting ADHF  - TTE with trace pericardial effusion   - CT Chest with small pericardial effusion   - Denies chest pain, palpitations, chest tightness or discomfort, shortness of breath or dyspnea   - Repeat TTE in 1 month for further evaluation   - Diuresis per cardio/ renal.  - Monitor on telemetry  - Cardio following    # Pleural effusion likely in setting ADHF  - CT Chest with small BL pleural effusions  - Monitor O2 saturation  - Supplement to maintain > 90%   - Diuresis per cardio/ renal.     # JAMEL with Hyponatremia and Metabolic Acidosis   - Baseline CRE 0.7-1.2 and increased to ~4  - As per OSH documentation, JAMEL was thought to be second to Unasyn/ Bumex / Entresto use and Hypotension   - US RENAL with no hydronephrosis  - Likely cardiorenal induced with low flow state in ADHF, however now rising again and concern for milrinone vs overdiuresis (prerenal) vs cardiorenal.   - Milrinone adjusted and diuresis turned off, however no improvement/ worsened in renal function noted.   - s/p shiley placement and started on HD 2/20  - Last HD on 3/3, attempted to monitor off, however CRE rapidly increasing. Per discussion with renal will likely need long term HD with long term access.   - HF and renal following   - Avoid nephrotoxic agents  - Monitor Cr and daily BMP     # Hypernatremia to hyponatremia  - Hypernatremia likely from HTS vs over diuresis/ intravascular dehydration. HTS and diuresis stopped with improved sodium   - Hyponatremia now noted second to volume overload   - Avoid overcorrection > 6-8 mEq in 24 hours  - Monitor sodium with HD    # Transaminitis  - Likely 2/2 hepatic congestion   - Volume removal with HD as tolerated  - Trend LFTs, if uptrend post-HD initiation, check ABD US  - Serial ABD Examinations      # Leukocytosis likely in setting of BL LE ulcers with pop occlusion   - BCx negative   - UCx with probable contamination   - S/P unasyn for ABX.   - Leukocytosis fluctuating, however appears nontoxic with no fever spikes and monitoring closely off ABX  - If febrile check pan cultures   - Trend CBC, temp curve, VS and adjust as tolerated    # Foot ulcers with worsening RLE pain second to pop artery occlusion +/- diabetic ulcers   - RLE Arterial Duplex with popliteal artery occlusion   - LLE Arterial Duplex with underlying disease cannot be excluded   - s/p angio with pop and AT VERA on 2/24   - Continue on Lipitor and DAPT  - Continue pain control with oxy  - Wound care called for dressing recs   - Vascular following,     # Visual Disturbance  - CT Head w/ old infarcts  - On ASA and Statin   - Opthalmology eval appreciated    # Indigestion and Constipation   - PPI, miralax and senna continued  - Monitor BMs    # Anemia likely mixed AOCD vs iron deficiency   - HH stable in 9s on HSQ  - Anemia panel with AOCD   - Started on venofer, but ferritin high and now stopped   - Continue on EPO with HD per renal  - Monitor HH   - Transfuse for Hgb < 8  - Maintain active T/S    # Diabetes Mellitus A1C 11.6   - Continue on lantus 13 with lispro 5 and ISS   - Diabetic DASH diet   - Monitor and adjust glucose levels PRN   - Endocrine following; F/u recs     # HLD and HLD  - Continue on lipitor and toprol  - Monitor BP    # DISPO TBD  - Palliative care called and patient remains FULL CODE     Discussed with Attending   63 YO F with PMHx of HFrEF 30-35 and grade 2 ddfxn (last TTE on 01/16/25), DM2, and diabetic foot ulcer. Recent admission at [facility] for ADHF. Patient now represents to OSH and ultimately to [facility] as a transfer for worsening right leg pain and fluid secretion. As per documentation, patient was reported to have severe depletion of RLE blood flow beyond the popliteal vessel at knee and similar findings in the left. Patient was transferred to [facility] for CO2 angiogram with Dr. Baltazar. Of note, patient also with reported visual disturbance for which patient was seen and evaluated by opthalmology. Internal Medicine has been consulted on Ms. Kirby's care for medical management.     # ADHF/ BiV HFrEF 20   - Recent admission at [facility] for ADHF and on dobutamine outpatient  - TTE 1/16 with EF 30-35 with moderately reduced LVSF and grade 2 ddfxn, normal RVSF , trace TR/ RI, and trace pericardial effusion  - RPT TTE with EF 20, severely decreased LV, decreased RVSF with TAPSE 1.2, and regional wall motion abnormalities present with entire septum, entire apex, entire inferior wall, mid inferolateral segment, and mid anterolateral segment are hypokinetic.   - RHC with RA 20, PA 49/29 (40), PCWP 35, mVO2 51.1, CO/CI 3.8/2.2, SVR 1095 w/ Multivessel CAD   - s/p zaroxyln 10 QD to augment diuresis   - s/p bumex GTT   - s/p HTS to augment diuresis   - RPT limited TTE w/ LVSF severely decreased, reduced RVSF, LVOT VTI 12, mild to mod TR, and mildly elevated right atrial pressure  - Case discussed with EP and needs to be on GDMT for 3 months before AICD placement and should be stabilized off of inotropic support.   - Continue on milrinone gtt as per Cardio   - Continue on hydral 25 and isordil 5, however BP running soft and hydral turned off.   - IF BP continues to run soft may need to hold isordil as well.   - Continue on bumex 4 IV QD but anuric   - Will need to discuss holding further diuresis and remove volume with HD  - Pending RPT ECHO  - HF and cards following and adjusting medications   - Monitor I and O, diuresis per Cardio/ Renal    - Monitor volume status   - Check daily weights    - Monitor on telemetry; Monitor electrolytes   - Cardio, HF, Renal, and EP evals appreciated; F/u recs     # Tachycardia and Hypoxia  - Tachycardiac and on RA with SPO2 running 90-92   - Could be second to low cardiac output   - CTA with no PE  - Monitor closely on Tele    # CAD with TVD   - NST abnormal with small-sized, moderate defect in apical wall that is predominantly fixed suggestive of an infarction with minimal marcus-infarct ischemia. Post stress LVEF 25.  - s/p LHC with RCA , severe ostial LM disease, diffuse disease in LAD and LCx, some L to R collaterals (Multivessel CAD)  - Case discussed with CTSx and NOT a candidate for CABG due to comorbidities  - Cardiac MRI with greater than 75% subendocardial scarring of the basal to mid-inferior wall of the LV and 50% scarring of the left ventricular basal inferoseptal wall. There is also left ventricular transmural scarring involving the apical septal wall and likely near transmural scarring of the apical lateral wall.  - s/p RPT LHC 3/4 with LM 80 s/p POBA/ VERA with LM 1, pLAD 90 s/p POBA/ VERA with pLAD 1, and Cx 80 s/p POBA with Cx 10.   - Continue on DAPT and Statin   - Pending RPT ECHO  - IC, Cards and HF following     # BL LE Edema likely in setting of ADHF  - Remove volume as per Cardio, HF and Renal   - BL LE elevation and compression  - LE Duplex neg   - Monitor for now    # Pericardial effusion likely in setting ADHF  - TTE with trace pericardial effusion   - CT Chest with small pericardial effusion   - Denies chest pain, palpitations, chest tightness or discomfort, shortness of breath or dyspnea   - Repeat TTE in 1 month for further evaluation   - Diuresis per cardio/ renal.  - Monitor on telemetry  - Cardio following    # Pleural effusion likely in setting ADHF  - CT Chest with small BL pleural effusions  - Monitor O2 saturation  - Supplement to maintain > 90%   - Diuresis per cardio/ renal.     # JAMEL with Hyponatremia and Metabolic Acidosis   - Baseline CRE 0.7-1.2 and increased to ~4  - As per OSH documentation, JAMEL was thought to be second to Unasyn/ Bumex / Entresto use and Hypotension   - US RENAL with no hydronephrosis  - Likely cardiorenal induced with low flow state in ADHF, however now rising again and concern for milrinone vs overdiuresis (prerenal) vs cardiorenal.   - Milrinone adjusted and diuresis turned off, however no improvement/ worsened in renal function noted.   - s/p shiley placement and started on HD 2/20  - Last HD on 3/3, attempted to monitor off, however BUN/ CRE rapidly increasing.   - Per discussion with renal will likely need long term HD with long term access.   - Plan for HD today   - HF and renal following   - Avoid nephrotoxic agents  - Monitor Cr and daily BMP     # Hypernatremia to hyponatremia  - Hypernatremia likely from HTS vs over diuresis/ intravascular dehydration. HTS and diuresis stopped with improved sodium   - Hyponatremia now noted second to volume overload   - Avoid overcorrection > 6-8 mEq in 24 hours  - Monitor sodium with HD    # Transaminitis  - Likely 2/2 hepatic congestion   - Volume removal with HD as tolerated  - Trend LFTs, if uptrend post-HD initiation, check ABD US  - Serial ABD Examinations      # Leukocytosis likely in setting of BL LE ulcers with pop occlusion   - BCx negative   - UCx with probable contamination   - S/P unasyn for ABX.   - Leukocytosis fluctuating, however appears nontoxic with no fever spikes and monitoring closely off ABX  - If febrile check pan cultures   - Trend CBC, temp curve, VS and adjust as tolerated    # Foot ulcers with worsening RLE pain second to pop artery occlusion +/- diabetic ulcers   - RLE Arterial Duplex with popliteal artery occlusion   - LLE Arterial Duplex with underlying disease cannot be excluded   - s/p angio with pop and AT VERA on 2/24   - Continue on Lipitor and DAPT  - Continue pain control with oxy  - Wound care called for dressing recs   - Vascular following,     # Visual Disturbance  - CT Head w/ old infarcts  - On ASA and Statin   - Opthalmology eval appreciated    # Indigestion and Constipation   - PPI, miralax and senna continued  - Monitor BMs    # Anemia likely mixed AOCD vs iron deficiency   - HH stable in 9s on HSQ  - Anemia panel with AOCD   - Started on venofer, but ferritin high and now stopped   - Continue on EPO with HD per renal  - Monitor HH   - Transfuse for Hgb < 8  - Maintain active T/S    # Diabetes Mellitus A1C 11.6   - Continue on lantus 13 with lispro 5 and ISS   - Diabetic DASH diet   - Monitor and adjust glucose levels PRN   - Endocrine following; F/u recs     # HLD and HLD  - Continue on lipitor and toprol  - Monitor BP    # DISPO TBD  - Palliative care called and patient remains FULL CODE     Discussed with Attending   65 YO F with PMHx of HFrEF 30-35 and grade 2 ddfxn (last TTE on 01/16/25), DM2, and diabetic foot ulcer. Recent admission at [facility] for ADHF. Patient now represents to OSH and ultimately to [facility] as a transfer for worsening right leg pain and fluid secretion. As per documentation, patient was reported to have severe depletion of RLE blood flow beyond the popliteal vessel at knee and similar findings in the left. Patient was transferred to [facility] for CO2 angiogram with Dr. Baltazar. Of note, patient also with reported visual disturbance for which patient was seen and evaluated by opthalmology. Internal Medicine has been consulted on Ms. Kirby's care for medical management.     # ADHF/ BiV HFrEF 20   - Recent admission at [facility] for ADHF and on dobutamine outpatient  - TTE 1/16 with EF 30-35 with moderately reduced LVSF and grade 2 ddfxn, normal RVSF , trace TR/ MO, and trace pericardial effusion  - RPT TTE with EF 20, severely decreased LV, decreased RVSF with TAPSE 1.2, and regional wall motion abnormalities present with entire septum, entire apex, entire inferior wall, mid inferolateral segment, and mid anterolateral segment are hypokinetic.   - RHC with RA 20, PA 49/29 (40), PCWP 35, mVO2 51.1, CO/CI 3.8/2.2, SVR 1095 w/ Multivessel CAD   - s/p zaroxyln 10 QD to augment diuresis   - s/p bumex GTT   - s/p HTS to augment diuresis   - RPT limited TTE w/ LVSF severely decreased, reduced RVSF, LVOT VTI 12, mild to mod TR, and mildly elevated right atrial pressure  - Case discussed with EP and needs to be on GDMT for 3 months before AICD placement and should be stabilized off of inotropic support.   - Continue on bumex 4 IV QD but anuric and will DC  - Continue on milrinone gtt as per Cardio   - Continue on hydral 25 and isordil 5, however BP running soft and hydral turned off. IF BP continues to run soft may need to hold isordil as well.   - Pending RPT ECHO post cath  - HF and cards following and adjusting medications   - Monitor I and O, diuresis per Cardio/ Renal    - Monitor volume status   - Check daily weights    - Monitor on telemetry; Monitor electrolytes   - Cardio, HF, Renal, and EP evals appreciated; F/u recs     # Tachycardia and Hypoxia  - Tachycardiac and on RA with SPO2 running 90-92   - Could be second to low cardiac output   - CTA with no PE  - Monitor closely on Tele    # CAD with TVD   - NST abnormal with small-sized, moderate defect in apical wall that is predominantly fixed suggestive of an infarction with minimal marcus-infarct ischemia. Post stress LVEF 25.  - s/p LHC with RCA , severe ostial LM disease, diffuse disease in LAD and LCx, some L to R collaterals (Multivessel CAD)  - Case discussed with CTSx and NOT a candidate for CABG due to comorbidities  - Cardiac MRI with greater than 75% subendocardial scarring of the basal to mid-inferior wall of the LV and 50% scarring of the left ventricular basal inferoseptal wall. There is also left ventricular transmural scarring involving the apical septal wall and likely near transmural scarring of the apical lateral wall.  - s/p RPT LHC 3/4 with LM 80 s/p POBA/ VERA with LM 1, pLAD 90 s/p POBA/ VERA with pLAD 1, and Cx 80 s/p POBA with Cx 10.   - Continue on DAPT and Statin   - Pending RPT ECHO  - IC, Cards and HF following     # BL LE Edema likely in setting of ADHF  - Remove volume as per Cardio, HF and Renal   - BL LE elevation and compression  - LE Duplex neg   - Monitor for now    # Pericardial effusion likely in setting ADHF  - TTE with trace pericardial effusion   - CT Chest with small pericardial effusion   - Denies chest pain, palpitations, chest tightness or discomfort, shortness of breath or dyspnea   - Repeat TTE in 1 month for further evaluation   - Diuresis per cardio/ renal.  - Monitor on telemetry  - Cardio following    # Pleural effusion likely in setting ADHF  - CT Chest with small BL pleural effusions  - Monitor O2 saturation  - Supplement to maintain > 90%   - Diuresis per cardio/ renal.     # JAMEL with Hyponatremia and Metabolic Acidosis   - Baseline CRE 0.7-1.2 and increased to ~4  - As per OSH documentation, JAMEL was thought to be second to Unasyn/ Bumex / Entresto use and Hypotension   - US RENAL with no hydronephrosis  - Likely cardiorenal induced with low flow state in ADHF, however now rising again and concern for milrinone vs overdiuresis (prerenal) vs cardiorenal.   - Milrinone adjusted and diuresis turned off, however no improvement/ worsened in renal function noted.   - s/p shiley placement and started on HD 2/20  - Last HD on 3/3, attempted to monitor off, however BUN/ CRE rapidly increasing.   - Per discussion with renal will likely need long term HD with long term access and IR consulted for shiley to permacath conversion.   - Plan for HD today   - HF and renal following   - Avoid nephrotoxic agents  - Monitor Cr and daily BMP     # Hypernatremia to hyponatremia  - Hypernatremia likely from HTS vs over diuresis/ intravascular dehydration. HTS and diuresis stopped with improved sodium   - Hyponatremia now noted second to volume overload   - Avoid overcorrection > 6-8 mEq in 24 hours  - Monitor sodium with HD    # Transaminitis  - Likely 2/2 hepatic congestion   - Volume removal with HD as tolerated  - Trend LFTs, if uptrend post-HD initiation, check ABD US  - Serial ABD Examinations      # Leukocytosis likely in setting of BL LE ulcers with pop occlusion   - BCx negative   - UCx with probable contamination   - S/P unasyn for ABX.   - Leukocytosis fluctuating, however appears nontoxic with no fever spikes and monitoring closely off ABX  - If febrile check pan cultures   - Trend CBC, temp curve, VS and adjust as tolerated    # Foot ulcers with worsening RLE pain second to pop artery occlusion +/- diabetic ulcers   - RLE Arterial Duplex with popliteal artery occlusion   - LLE Arterial Duplex with underlying disease cannot be excluded   - s/p angio with pop and AT VERA on 2/24   - Continue on Lipitor and DAPT  - Continue pain control with oxy  - Wound care called for dressing recs   - Vascular following,     # Visual Disturbance  - CT Head w/ old infarcts  - On ASA and Statin   - Opthalmology eval appreciated    # Indigestion and Constipation   - PPI, miralax and senna continued  - Monitor BMs    # Anemia likely mixed AOCD vs iron deficiency   - HH stable in 9s on HSQ  - Anemia panel with AOCD   - Started on venofer, but ferritin high and now stopped   - Continue on EPO with HD per renal  - Monitor HH   - Transfuse for Hgb < 8  - Maintain active T/S    # Diabetes Mellitus A1C 11.6   - Continue on lantus 13 with lispro 5 and ISS   - Diabetic DASH diet   - Monitor and adjust glucose levels PRN   - Endocrine following; F/u recs     # HLD and HLD  - Continue on lipitor and toprol  - Monitor BP    # DISPO TBD  - Palliative care called and patient remains FULL CODE     Discussed with Attending

## 2025-03-07 NOTE — PROGRESS NOTE ADULT - ASSESSMENT
65y/o F w/h/o uncontrolled T2DM (A1C 11.6%) on Tresiba and CeQur insulin patch PTA. DM c/b PAD, retinopathy, CKD, CAD. Also h/oType 2 DM, HTN, HLD. Presented to OSH with worsening right leg pain and fluid secretion. Transferred to [facility] for CO2 angiogram. Found to have Low EF to 20% in OSBALDO. s/p highrisk PCI on 3/3. Endocrine consulted for Type 2 DM management, uncontrolled. BG Goal 100-180mg/dl   Tolerating POs, eats full meals. Last 24 hour BGs in 100s at goal with fasting . On Milrinone gtt and Bumex IV.        #uncontrolled Type 2 diabetes mellitus   A1C with Estimated Average Glucose Result: 11.6 % (01-24-25 @ 05:40)  A1C with Estimated Average Glucose Result: >15.5 % (12-13-24 @ 06:49)    Home regimen: Tresiba 40 units, CeQur insulin patch about 2-10 units TID with meals

## 2025-03-07 NOTE — PROGRESS NOTE ADULT - SUBJECTIVE AND OBJECTIVE BOX
Subjective:  NAEO. Very minimal UO. Scr continues to uptrend. Likely plan for HD today as per nephro.     Medications:  acetaminophen     Tablet .. 650 milliGRAM(s) Oral every 6 hours PRN  aspirin enteric coated 81 milliGRAM(s) Oral daily  atorvastatin 40 milliGRAM(s) Oral at bedtime  benzocaine/menthol Lozenge 1 Lozenge Oral once  bisacodyl 5 milliGRAM(s) Oral every 12 hours  buMETAnide IVPB 4 milliGRAM(s) IV Intermittent daily  chlorhexidine 4% Liquid 1 Application(s) Topical <User Schedule>  clopidogrel Tablet 75 milliGRAM(s) Oral daily  clopidogrel Tablet      dextrose 5%. 1000 milliLiter(s) IV Continuous <Continuous>  dextrose 5%. 1000 milliLiter(s) IV Continuous <Continuous>  dextrose 50% Injectable 25 Gram(s) IV Push once  dextrose 50% Injectable 12.5 Gram(s) IV Push once  dextrose 50% Injectable 25 Gram(s) IV Push once  dextrose Oral Gel 15 Gram(s) Oral once PRN  glucagon  Injectable 1 milliGRAM(s) IntraMuscular once  heparin   Injectable 5000 Unit(s) SubCutaneous every 8 hours  HYDROmorphone  Injectable 1 milliGRAM(s) IV Push every 6 hours PRN  insulin glargine Injectable (LANTUS) 13 Unit(s) SubCutaneous at bedtime  insulin lispro (ADMELOG) corrective regimen sliding scale   SubCutaneous three times a day before meals  insulin lispro (ADMELOG) corrective regimen sliding scale   SubCutaneous at bedtime  insulin lispro Injectable (ADMELOG) 5 Unit(s) SubCutaneous three times a day with meals  isosorbide   dinitrate Tablet (ISORDIL) 5 milliGRAM(s) Oral three times a day  melatonin 3 milliGRAM(s) Oral at bedtime PRN  milrinone Infusion 0.125 MICROgram(s)/kG/Min IV Continuous <Continuous>  Nephro-rober 1 Tablet(s) Oral daily  ondansetron Injectable 4 milliGRAM(s) IV Push every 6 hours PRN  pantoprazole  Injectable 40 milliGRAM(s) IV Push daily  polyethylene glycol 3350 17 Gram(s) Oral daily  senna 2 Tablet(s) Oral at bedtime  sodium chloride 0.65% Nasal 1 Spray(s) Both Nostrils three times a day PRN  sodium chloride 0.9% lock flush 10 milliLiter(s) IV Push every 1 hour PRN    Physical Exam:    Vitals:  Vital Signs Last 24 Hours  T(C): 36.6 (25 @ 04:29), Max: 37.1 (25 @ 20:30)  HR: 112 (25 @ 04:29) (112 - 116)  BP: 96/60 (25 @ 04:29) (94/58 - 103/66)  RR: 18 (25 @ 04:29) (17 - 18)  SpO2: 96% (25 @ 04:29) (93% - 96%)    Weight in k.4 ( @ :29)    I&O's Summary    06 Mar 2025 07:01  -  07 Mar 2025 07:00  --------------------------------------------------------  IN: 120 mL / OUT: 0 mL / NET: 120 mL      General: No distress. Comfortable.  HEENT: EOM intact.  Neck: Neck supple. JVP not elevated. No masses  Chest: Clear to auscultation bilaterally  CV: Normal S1 and S2. No murmurs, rub, or gallops. Radial pulses normal.  Abdomen: Soft, non-distended, non-tender  Skin: No rashes or skin breakdown  Neurology: Alert and oriented times three. Sensation intact  Psych: Affect normal    Labs:                        9.5    10.51 )-----------( 198      ( 05 Mar 2025 08:39 )             27.9         127[L]  |  89[L]  |  45[H]  ----------------------------<  140[H]  3.7   |  22  |  3.63[H]    Ca    8.8      06 Mar 2025 07:14  Mg     1.8                          Subjective:  NAEO. Very minimal UO. Scr continues to uptrend. Likely plan for HD today as per nephro.     Medications:  acetaminophen     Tablet .. 650 milliGRAM(s) Oral every 6 hours PRN  aspirin enteric coated 81 milliGRAM(s) Oral daily  atorvastatin 40 milliGRAM(s) Oral at bedtime  benzocaine/menthol Lozenge 1 Lozenge Oral once  bisacodyl 5 milliGRAM(s) Oral every 12 hours  buMETAnide IVPB 4 milliGRAM(s) IV Intermittent daily  chlorhexidine 4% Liquid 1 Application(s) Topical <User Schedule>  clopidogrel Tablet 75 milliGRAM(s) Oral daily  clopidogrel Tablet      dextrose 5%. 1000 milliLiter(s) IV Continuous <Continuous>  dextrose 5%. 1000 milliLiter(s) IV Continuous <Continuous>  dextrose 50% Injectable 25 Gram(s) IV Push once  dextrose 50% Injectable 12.5 Gram(s) IV Push once  dextrose 50% Injectable 25 Gram(s) IV Push once  dextrose Oral Gel 15 Gram(s) Oral once PRN  glucagon  Injectable 1 milliGRAM(s) IntraMuscular once  heparin   Injectable 5000 Unit(s) SubCutaneous every 8 hours  HYDROmorphone  Injectable 1 milliGRAM(s) IV Push every 6 hours PRN  insulin glargine Injectable (LANTUS) 13 Unit(s) SubCutaneous at bedtime  insulin lispro (ADMELOG) corrective regimen sliding scale   SubCutaneous three times a day before meals  insulin lispro (ADMELOG) corrective regimen sliding scale   SubCutaneous at bedtime  insulin lispro Injectable (ADMELOG) 5 Unit(s) SubCutaneous three times a day with meals  isosorbide   dinitrate Tablet (ISORDIL) 5 milliGRAM(s) Oral three times a day  melatonin 3 milliGRAM(s) Oral at bedtime PRN  milrinone Infusion 0.125 MICROgram(s)/kG/Min IV Continuous <Continuous>  Nephro-rober 1 Tablet(s) Oral daily  ondansetron Injectable 4 milliGRAM(s) IV Push every 6 hours PRN  pantoprazole  Injectable 40 milliGRAM(s) IV Push daily  polyethylene glycol 3350 17 Gram(s) Oral daily  senna 2 Tablet(s) Oral at bedtime  sodium chloride 0.65% Nasal 1 Spray(s) Both Nostrils three times a day PRN  sodium chloride 0.9% lock flush 10 milliLiter(s) IV Push every 1 hour PRN    Physical Exam:    Vitals:  Vital Signs Last 24 Hours  T(C): 36.6 (25 @ 04:29), Max: 37.1 (25 @ 20:30)  HR: 112 (25 @ 04:29) (112 - 116)  BP: 96/60 (25 @ 04:29) (94/58 - 103/66)  RR: 18 (25 @ 04:29) (17 - 18)  SpO2: 96% (25 @ 04:29) (93% - 96%)    Weight in k.4 ( @ 04:29)    I&O's Summary    06 Mar 2025 07:01  -  07 Mar 2025 07:00  --------------------------------------------------------  IN: 120 mL / OUT: 0 mL / NET: 120 mL      General: No distress. Comfortable.  HEENT: EOM intact.  Neck: Neck supple. JVP mildly elevated. No masses  Chest: Clear to auscultation bilaterally  CV: Normal S1 and S2. No murmurs, rub, or gallops. Radial pulses normal.  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  Neurology: Alert and oriented times three. Sensation intact  Psych: Affect normal    Labs:        126[L]  |  86[L]  |  53[H]  ----------------------------<  121[H]  4.5   |  21[L]  |  4.46[H]    Ca    9.1      07 Mar 2025 09:24  Phos  4.8       Mg     2.1

## 2025-03-08 LAB
ANION GAP SERPL CALC-SCNC: 13 MMOL/L — SIGNIFICANT CHANGE UP (ref 5–17)
BUN SERPL-MCNC: 28 MG/DL — HIGH (ref 7–23)
CALCIUM SERPL-MCNC: 8.4 MG/DL — SIGNIFICANT CHANGE UP (ref 8.4–10.5)
CHLORIDE SERPL-SCNC: 94 MMOL/L — LOW (ref 96–108)
CO2 SERPL-SCNC: 24 MMOL/L — SIGNIFICANT CHANGE UP (ref 22–31)
CREAT SERPL-MCNC: 3.05 MG/DL — HIGH (ref 0.5–1.3)
EGFR: 17 ML/MIN/1.73M2 — LOW
EGFR: 17 ML/MIN/1.73M2 — LOW
GLUCOSE BLDC GLUCOMTR-MCNC: 100 MG/DL — HIGH (ref 70–99)
GLUCOSE BLDC GLUCOMTR-MCNC: 121 MG/DL — HIGH (ref 70–99)
GLUCOSE BLDC GLUCOMTR-MCNC: 89 MG/DL — SIGNIFICANT CHANGE UP (ref 70–99)
GLUCOSE BLDC GLUCOMTR-MCNC: 99 MG/DL — SIGNIFICANT CHANGE UP (ref 70–99)
GLUCOSE SERPL-MCNC: 95 MG/DL — SIGNIFICANT CHANGE UP (ref 70–99)
HCT VFR BLD CALC: 25.7 % — LOW (ref 34.5–45)
HGB BLD-MCNC: 9 G/DL — LOW (ref 11.5–15.5)
MAGNESIUM SERPL-MCNC: 2 MG/DL — SIGNIFICANT CHANGE UP (ref 1.6–2.6)
MCHC RBC-ENTMCNC: 25.7 PG — LOW (ref 27–34)
MCHC RBC-ENTMCNC: 35 G/DL — SIGNIFICANT CHANGE UP (ref 32–36)
MCV RBC AUTO: 73.4 FL — LOW (ref 80–100)
NRBC BLD AUTO-RTO: 0 /100 WBCS — SIGNIFICANT CHANGE UP (ref 0–0)
PHOSPHATE SERPL-MCNC: 3.3 MG/DL — SIGNIFICANT CHANGE UP (ref 2.5–4.5)
PLATELET # BLD AUTO: 139 K/UL — LOW (ref 150–400)
POTASSIUM SERPL-MCNC: 3.8 MMOL/L — SIGNIFICANT CHANGE UP (ref 3.5–5.3)
POTASSIUM SERPL-SCNC: 3.8 MMOL/L — SIGNIFICANT CHANGE UP (ref 3.5–5.3)
RBC # BLD: 3.5 M/UL — LOW (ref 3.8–5.2)
RBC # FLD: 22.1 % — HIGH (ref 10.3–14.5)
SODIUM SERPL-SCNC: 131 MMOL/L — LOW (ref 135–145)
WBC # BLD: 9.78 K/UL — SIGNIFICANT CHANGE UP (ref 3.8–10.5)
WBC # FLD AUTO: 9.78 K/UL — SIGNIFICANT CHANGE UP (ref 3.8–10.5)

## 2025-03-08 RX ORDER — MIDODRINE HYDROCHLORIDE 5 MG/1
10 TABLET ORAL ONCE
Refills: 0 | Status: COMPLETED | OUTPATIENT
Start: 2025-03-08 | End: 2025-03-08

## 2025-03-08 RX ADMIN — Medication 1 TABLET(S): at 12:19

## 2025-03-08 RX ADMIN — HEPARIN SODIUM 5000 UNIT(S): 1000 INJECTION INTRAVENOUS; SUBCUTANEOUS at 21:59

## 2025-03-08 RX ADMIN — INSULIN LISPRO 5 UNIT(S): 100 INJECTION, SOLUTION INTRAVENOUS; SUBCUTANEOUS at 12:55

## 2025-03-08 RX ADMIN — ATORVASTATIN CALCIUM 40 MILLIGRAM(S): 80 TABLET, FILM COATED ORAL at 21:59

## 2025-03-08 RX ADMIN — Medication 650 MILLIGRAM(S): at 11:36

## 2025-03-08 RX ADMIN — INSULIN GLARGINE-YFGN 12 UNIT(S): 100 INJECTION, SOLUTION SUBCUTANEOUS at 21:59

## 2025-03-08 RX ADMIN — Medication 1 MILLIGRAM(S): at 21:58

## 2025-03-08 RX ADMIN — Medication 1 MILLIGRAM(S): at 00:45

## 2025-03-08 RX ADMIN — Medication 1 MILLIGRAM(S): at 15:13

## 2025-03-08 RX ADMIN — INSULIN LISPRO 5 UNIT(S): 100 INJECTION, SOLUTION INTRAVENOUS; SUBCUTANEOUS at 08:35

## 2025-03-08 RX ADMIN — Medication 2 TABLET(S): at 21:59

## 2025-03-08 RX ADMIN — Medication 81 MILLIGRAM(S): at 12:20

## 2025-03-08 RX ADMIN — Medication 650 MILLIGRAM(S): at 12:00

## 2025-03-08 RX ADMIN — Medication 40 MILLIGRAM(S): at 12:20

## 2025-03-08 RX ADMIN — MIDODRINE HYDROCHLORIDE 10 MILLIGRAM(S): 5 TABLET ORAL at 15:53

## 2025-03-08 RX ADMIN — Medication 1 MILLIGRAM(S): at 15:40

## 2025-03-08 RX ADMIN — Medication 1 APPLICATION(S): at 08:34

## 2025-03-08 RX ADMIN — CLOPIDOGREL BISULFATE 75 MILLIGRAM(S): 75 TABLET, FILM COATED ORAL at 12:20

## 2025-03-08 RX ADMIN — INSULIN LISPRO 5 UNIT(S): 100 INJECTION, SOLUTION INTRAVENOUS; SUBCUTANEOUS at 18:45

## 2025-03-08 RX ADMIN — Medication 3 MILLIGRAM(S): at 00:47

## 2025-03-08 NOTE — PROGRESS NOTE ADULT - SUBJECTIVE AND OBJECTIVE BOX
[facility] NEPHROLOGY- PROGRESS NOTE    64y Female with history of CHF presents as a transfer for LE CO2 angiogram. Nephrology consulted for elevated Scr.      REVIEW OF SYSTEMS:  Gen: no fevers  Cards: no chest pain  Resp: no dyspnea  GI: no nausea or vomiting or diarrhea  Vascular: + LE edema improving    Augmentin (Stomach Upset; Vomiting; Nausea)  No Known Allergies      Hospital Medications: Medications reviewed      VITALS:  T(F): 97.8 (03-08-25 @ 05:16), Max: 98.4 (03-08-25 @ 01:05)  HR: 102 (03-08-25 @ 05:16)  BP: 93/58 (03-08-25 @ 05:16)  RR: 18 (03-08-25 @ 05:16)  SpO2: 91% (03-08-25 @ 05:16)  Wt(kg): --    03-07 @ 07:01  -  03-08 @ 07:00  --------------------------------------------------------  IN: 240 mL / OUT: 1500 mL / NET: -1260 mL        PHYSICAL EXAM:  Gen: NAD, calm  Cards: RRR, +S1/S2, no M/G/R  Resp: +rales Rt > left at base  GI: soft, NT/ND, NABS  : no stiles  Vascular: + LE wrapped B/L  + RIJ subclavian      LABS:  03-08    131[L]  |  94[L]  |  28[H]  ----------------------------<  95  3.8   |  24  |  3.05[H]    Ca    8.4      08 Mar 2025 07:11  Phos  3.3     03-08  Mg     2.0     03-08      Creatinine Trend: 3.05 <--, 4.46 <--, 3.63 <--, 2.35 <--, 2.36 <--, 2.55 <--, 2.23 <--                        9.0    9.78  )-----------( 139      ( 08 Mar 2025 07:15 )             25.7     Urine Studies:  Urinalysis Basic - ( 08 Mar 2025 07:11 )    Color:  / Appearance:  / SG:  / pH:   Gluc: 95 mg/dL / Ketone:   / Bili:  / Urobili:    Blood:  / Protein:  / Nitrite:    Leuk Esterase:  / RBC:  / WBC    Sq Epi:  / Non Sq Epi:  / Bacteria:       < from: CT Angio Chest PE Protocol w/ IV Cont (03.07.25 @ 11:32) >    ACC: [id number] EXAM:  CT ANGIO CHEST PULM ART WAWIC   ORDERED BY: JORGE ALBERTO NEWMAN     PROCEDURE DATE:  03/07/2025          < end of copied text >  < from: CT Angio Chest PE Protocol w/ IV Cont (03.07.25 @ 11:32) >  IMPRESSION:  *  No pulmonary embolism.  *  Moderate right and small left pleural effusions.  *  Nonspecific peripheral groundglass opacities in the bilateral lungs,   slightly more prominent when compared to prior examination on 1/25/2025.        --- End of Report ---      < end of copied text >

## 2025-03-08 NOTE — PROGRESS NOTE ADULT - ASSESSMENT
64y Female with history of CHF presents as a transfer for LE CO2 angiogram. Nephrology consulted for elevated Scr.    1) JAMEL: likely due to ATN and CRS for which patient initiated on RRT on 2/20 with last HD 3/7 tolerated well with net 1.5L removed.  Scr increasing likely due to contrast nephropathy. Pt with b/l pleural effusions on CTA on 3/7. Plan for 2 hour PUF today with UF goal ~1.5L. Given poor UO and s/p repeat contrast load on 3/7, will plan for IR TDC placement on 3/11. On milrinone gtt as per HF. Bladder scan negative; repeat today. Avoid nephrotoxins.    2) HTN: BP low normal. Pt off isordil. Monitor BP.    3) LE edema: Pt off bumex 4 mg IV daily as patient remains anuric. UF with HD. TTE with severely decreased LVSF. Monitor UO.     4) Anemia: Hb improving with low TSAT. No IV iron given elevated ferritin. S/P Epo 8K X 1 dose 3/1. Monitor Hb.    5) Hyponatremia: Secondary to JAMEL and polydipsia. Will correct with HD. Continue with 1L [facility] NEPHROLOGY  Raji Waterman M.D.  Mars Feliz D.O.  Kelley Parks M.D.  MD Nancy Kulkarni, MSN, ANP-C    Telephone: [phone]  Facsimile: [phone]    [address] [place] [address], [address]  [place] [address]

## 2025-03-08 NOTE — PROGRESS NOTE ADULT - ASSESSMENT
63 YO F with PMHx of HFrEF 30-35 and grade 2 ddfxn (last TTE on 01/16/25), DM2, and diabetic foot ulcer. Recent admission at [facility] for ADHF. Patient now represents to OSH and ultimately to [facility] as a transfer for worsening right leg pain and fluid secretion. As per documentation, patient was reported to have severe depletion of RLE blood flow beyond the popliteal vessel at knee and similar findings in the left. Patient was transferred to [facility] for CO2 angiogram with Dr. Baltazar. Of note, patient also with reported visual disturbance for which patient was seen and evaluated by opthalmology. Internal Medicine has been consulted on Ms. Kirby's care for medical management.     # ADHF/ BiV HFrEF 20   - Recent admission at [facility] for ADHF and on dobutamine outpatient  - TTE 1/16 with EF 30-35 with moderately reduced LVSF and grade 2 ddfxn, normal RVSF , trace TR/ UT, and trace pericardial effusion  - RPT TTE with EF 20, severely decreased LV, decreased RVSF with TAPSE 1.2, and regional wall motion abnormalities present with entire septum, entire apex, entire inferior wall, mid inferolateral segment, and mid anterolateral segment are hypokinetic.   - RHC with RA 20, PA 49/29 (40), PCWP 35, mVO2 51.1, CO/CI 3.8/2.2, SVR 1095 w/ Multivessel CAD   - s/p zaroxyln 10 QD to augment diuresis   - s/p bumex GTT   - s/p HTS to augment diuresis   - RPT limited TTE w/ LVSF severely decreased, reduced RVSF, LVOT VTI 12, mild to mod TR, and mildly elevated right atrial pressure  - Case discussed with EP and needs to be on GDMT for 3 months before AICD placement and should be stabilized off of inotropic support.   - Continue on bumex 4 IV QD but anuric and will DC  - Continue on milrinone gtt as per Cardio   - Continue on hydral 25 and isordil 5, however BP running soft and hydral turned off. IF BP continues to run soft may need to hold isordil as well.   - Pending RPT ECHO post cath  - HF and cards following and adjusting medications   - Monitor I and O, diuresis per Cardio/ Renal    - Monitor volume status   - Check daily weights    - Monitor on telemetry; Monitor electrolytes   - Cardio, HF, Renal, and EP evals appreciated; F/u recs     # Tachycardia and Hypoxia  - Tachycardiac and on RA with SPO2 running 90-92   - Could be second to low cardiac output   - CTA with no PE  - Monitor closely on Tele    # CAD with TVD   - NST abnormal with small-sized, moderate defect in apical wall that is predominantly fixed suggestive of an infarction with minimal marcus-infarct ischemia. Post stress LVEF 25.  - s/p LHC with RCA , severe ostial LM disease, diffuse disease in LAD and LCx, some L to R collaterals (Multivessel CAD)  - Case discussed with CTSx and NOT a candidate for CABG due to comorbidities  - Cardiac MRI with greater than 75% subendocardial scarring of the basal to mid-inferior wall of the LV and 50% scarring of the left ventricular basal inferoseptal wall. There is also left ventricular transmural scarring involving the apical septal wall and likely near transmural scarring of the apical lateral wall.  - s/p RPT LHC 3/4 with LM 80 s/p POBA/ VERA with LM 1, pLAD 90 s/p POBA/ VERA with pLAD 1, and Cx 80 s/p POBA with Cx 10.   - Continue on DAPT and Statin   - Pending RPT ECHO  - IC, Cards and HF following     # BL LE Edema likely in setting of ADHF  - Remove volume as per Cardio, HF and Renal   - BL LE elevation and compression  - LE Duplex neg   - Monitor for now    # Pericardial effusion likely in setting ADHF  - TTE with trace pericardial effusion   - CT Chest with small pericardial effusion   - Denies chest pain, palpitations, chest tightness or discomfort, shortness of breath or dyspnea   - Repeat TTE in 1 month for further evaluation   - Diuresis per cardio/ renal.  - Monitor on telemetry  - Cardio following    # Pleural effusion likely in setting ADHF  - CT Chest with small BL pleural effusions  - Monitor O2 saturation  - Supplement to maintain > 90%   - Diuresis per cardio/ renal.     # JAMEL with Hyponatremia and Metabolic Acidosis   - Baseline CRE 0.7-1.2 and increased to ~4  - As per OSH documentation, JAMEL was thought to be second to Unasyn/ Bumex / Entresto use and Hypotension   - US RENAL with no hydronephrosis  - Likely cardiorenal induced with low flow state in ADHF, however now rising again and concern for milrinone vs overdiuresis (prerenal) vs cardiorenal.   - Milrinone adjusted and diuresis turned off, however no improvement/ worsened in renal function noted.   - s/p shiley placement and started on HD 2/20  - Last HD on 3/3, attempted to monitor off, however BUN/ CRE rapidly increasing.   - Per discussion with renal will likely need long term HD with long term access and IR consulted for shiley to permacath conversion.   - Plan for HD today   - HF and renal following   - Avoid nephrotoxic agents  - Monitor Cr and daily BMP     # Hypernatremia to hyponatremia  - Hypernatremia likely from HTS vs over diuresis/ intravascular dehydration. HTS and diuresis stopped with improved sodium   - Hyponatremia now noted second to volume overload   - Avoid overcorrection > 6-8 mEq in 24 hours  - Monitor sodium with HD    # Transaminitis  - Likely 2/2 hepatic congestion   - Volume removal with HD as tolerated  - Trend LFTs, if uptrend post-HD initiation, check ABD US  - Serial ABD Examinations      # Leukocytosis likely in setting of BL LE ulcers with pop occlusion   - BCx negative   - UCx with probable contamination   - S/P unasyn for ABX.   - Leukocytosis fluctuating, however appears nontoxic with no fever spikes and monitoring closely off ABX  - If febrile check pan cultures   - Trend CBC, temp curve, VS and adjust as tolerated    # Foot ulcers with worsening RLE pain second to pop artery occlusion +/- diabetic ulcers   - RLE Arterial Duplex with popliteal artery occlusion   - LLE Arterial Duplex with underlying disease cannot be excluded   - s/p angio with pop and AT VERA on 2/24   - Continue on Lipitor and DAPT  - Continue pain control with oxy  - Wound care called for dressing recs   - Vascular following,     # Visual Disturbance  - CT Head w/ old infarcts  - On ASA and Statin   - Opthalmology eval appreciated    # Indigestion and Constipation   - PPI, miralax and senna continued  - Monitor BMs    # Anemia likely mixed AOCD vs iron deficiency   - HH stable in 9s on HSQ  - Anemia panel with AOCD   - Started on venofer, but ferritin high and now stopped   - Continue on EPO with HD per renal  - Monitor HH   - Transfuse for Hgb < 8  - Maintain active T/S    # Diabetes Mellitus A1C 11.6   - Continue on lantus 13 with lispro 5 and ISS   - Diabetic DASH diet   - Monitor and adjust glucose levels PRN   - Endocrine following; F/u recs     # HLD and HLD  - Continue on lipitor and toprol  - Monitor BP    # DISPO TBD  - Palliative care called and patient remains FULL CODE

## 2025-03-08 NOTE — PROGRESS NOTE ADULT - SUBJECTIVE AND OBJECTIVE BOX
MR#[id number]  PATIENT NAME:COLE ALBA    DATE OF SERVICE: 03-08-25 @ 07:24  Patient was seen and examined by Yordy Gilmore MD on    03-08-25 @ 07:24 .  Interim events noted.Consultant notes ,Labs,Telemetry reviewed by me       HOSPITAL COURSE: HPI:  64F, hx of CHF (last TTE on 1/16/25 EF 30-35%, G2DD), DM, diabetic foot ulcer with a recent admission at [facility] for CHF exacerbation presented as a transfer for worsening R. leg pain and fluid secretion, On non-invasive imaging demonstrating severe depletion of RLE blood flow beyond the popliteal vessel at knee and similar findings in L. Was transferred to [facility] for CO2 angiogram with Dr. Baltazar. (29 Jan 2025 20:05)      INTERIM EVENTS:Patient seen at bedside ,interim events noted.  02/14-Had cath Multivessel CAD started on Milrinone for CTS evaluation albeit targets not optimal  02/15-Awake on Milrinone and Bumex gtt-seen by CTS not a surgical candidate-needs Vascular work-up for LE PAD-scheduled for Angiogram on Wednesday    Weight 170Kg---> 164 Kg--->161.1 Kg---151.2 ---> 155 --> 154.7 ---> 158 --> 148--->147 >148 --> 146 Kg    02/25-s/p RLE angiogram with pop and AT angioplasty   02/27-Awake had iHD plan for High risk PCI oLM/LAD tomorrow 1000mL removed  Had ? pAF ~~3 secs  02/28-Awake no dyspnea chest pain plan for PCI-oLM/LAD todat HD after PCI-Sinus rhythm  03/01-Had [facility] PCWP-15 still elevated with low BP Plan to dialyse over weekend and plan for PCI on Monday-No dyspnea  03/02-Awake had HD removed 1500Ml no dyspnea Plan for PCI tomorrow  03/03-Awake Sinus rhythm no dyspnea FOR High risk PCI oLM/LAD   03/04-Awake had HD last night-1500mL removed For High risk PCI today  03/05-Awake s/p LHC VERA x 1 to LAD, VERA to LM , POBA to CX via RFA  03/06-Awake no dyspnea chest pain  03/07-Awake poor UOP plan to continue HD  03/08-Awake Sinus Rhythm CTPA No PE Had HD 1500mL removed    PMH -reviewed admission note, no change since admission  HEART FAILURE: Acute[x ]Chronic[ ] Systolic[x ] Diastolic[ ] Combined Systolic and Diastolic[ ]  CAD[x ] CABG[ ] PCI[ ]  DEVICES[ ] PPM[ ] ICD[ ] ILR[ ]  ATRIAL FIBRILLATION[ ] Paroxysmal[ ] Permanent[ ] CHADS2-[  ]  JAMEL[x ] CKD1[ ] CKD2[ ] CKD3[ ] CKD4[x ] ESRD[ ]  COPD[ ] HTN[x ]   DM[x ] Type1[ ] Type 2[x ]   CVA[ ] Paresis[ ]    AMBULATION: Assisted[x ] Cane/walker[ ] Independent[ ]          MEDICATIONS  (STANDING):  aspirin enteric coated 81 milliGRAM(s) Oral daily  atorvastatin 40 milliGRAM(s) Oral at bedtime  benzocaine/menthol Lozenge 1 Lozenge Oral once  bisacodyl 5 milliGRAM(s) Oral every 12 hours  chlorhexidine 4% Liquid 1 Application(s) Topical <User Schedule>  clopidogrel Tablet 75 milliGRAM(s) Oral daily  clopidogrel Tablet      glucagon  Injectable 1 milliGRAM(s) IntraMuscular once  heparin   Injectable 5000 Unit(s) SubCutaneous every 8 hours  insulin glargine Injectable (LANTUS) 12 Unit(s) SubCutaneous at bedtime  insulin lispro (ADMELOG) corrective regimen sliding scale   SubCutaneous three times a day before meals  insulin lispro (ADMELOG) corrective regimen sliding scale   SubCutaneous at bedtime  insulin lispro Injectable (ADMELOG) 5 Unit(s) SubCutaneous three times a day with meals  milrinone Infusion 0.125 MICROgram(s)/kG/Min (2.7 mL/Hr) IV Continuous <Continuous>  Nephro-rober 1 Tablet(s) Oral daily  pantoprazole  Injectable 40 milliGRAM(s) IV Push daily  polyethylene glycol 3350 17 Gram(s) Oral daily  senna 2 Tablet(s) Oral at bedtime    MEDICATIONS  (PRN):  acetaminophen     Tablet .. 650 milliGRAM(s) Oral every 6 hours PRN Mild Pain (1 - 3)  dextrose Oral Gel 15 Gram(s) Oral once PRN Blood Glucose LESS THAN 70 milliGRAM(s)/deciliter  HYDROmorphone  Injectable 1 milliGRAM(s) IV Push every 6 hours PRN Severe Pain (7 - 10)  melatonin 3 milliGRAM(s) Oral at bedtime PRN Insomnia  ondansetron Injectable 4 milliGRAM(s) IV Push every 6 hours PRN Nausea and/or Vomiting  sodium chloride 0.65% Nasal 1 Spray(s) Both Nostrils three times a day PRN Dryness  sodium chloride 0.9% lock flush 10 milliLiter(s) IV Push every 1 hour PRN Pre/post blood products, medications, blood draw, and to maintain line patency            REVIEW OF SYSTEMS:  Constitutional: [ ] fever, [ ]weight loss,  [x ]fatigue [ ]weight gain  Eyes: [ ] visual changes  Respiratory: [ ]shortness of breath;  [ ] cough, [ ]wheezing, [ ]chills, [ ]hemoptysis  Cardiovascular: [ ] chest pain, [ ]palpitations, [ ]dizziness,  [ ]leg swelling[ ]orthopnea[ ]PND  Gastrointestinal: [ ] abdominal pain, [ ]nausea, [ ]vomiting,  [ ]diarrhea [ ]Constipation [ ]Melena  Genitourinary: [ ] dysuria, [ ] hematuria [ ][place]  Neurologic: [ ] headaches [ ] tremors[ ]weakness [ ]Paralysis Right[ ] Left[ ]  Skin: [ ] itching, [ ]burning, [ ] rashes  Endocrine: [ ] heat or cold intolerance  Musculoskeletal: [ ] joint pain or swelling; [ ] muscle, back, or extremity pain  Psychiatric: [ ] depression, [ ]anxiety, [ ]mood swings, or [ ]difficulty sleeping  Hematologic: [ ] easy bruising, [ ] bleeding gums    [ ] All remaining systems negative except as per above.   [ ]Unable to obtain.  [x] No change in ROS since admission      Vital Signs Last 24 Hrs  T(C): 36.6 (08 Mar 2025 05:16), Max: 36.9 (08 Mar 2025 01:05)  T(F): 97.8 (08 Mar 2025 05:16), Max: 98.4 (08 Mar 2025 01:05)  HR: 102 (08 Mar 2025 05:16) (102 - 120)  BP: 93/58 (08 Mar 2025 05:16) (93/58 - 117/54)  RR: 18 (08 Mar 2025 05:16) (18 - 18)  SpO2: 91% (08 Mar 2025 05:16) (91% - 97%)    Parameters below as of 07 Mar 2025 21:04  Patient On (Oxygen Delivery Method): room air      I&O's Summary    07 Mar 2025 07:01  -  08 Mar 2025 07:00  --------------------------------------------------------  IN: 240 mL / OUT: 1500 mL / NET: -1260 mL        PHYSICAL EXAM:  General: No acute distress BMI-28  HEENT: EOMI, PERRL  Neck: Supple, [ ] JVD  Lungs: Equal air entry bilaterally; [ ] rales [ ] wheezing [ ] rhonchi  Heart: Regular rate and rhythm; [x ] murmur   2/6 [ x] systolic [ ] diastolic [ ] radiation[ ] rubs [ ]  gallops  Abdomen: Nontender, bowel sounds present  Extremities: No clubbing, cyanosis, [ x] edema [ x]]Warm, well perfused        RLE: Dressings in place, blistering and ulceration on the dorsal aspect of the foot        LLE: First digit dry gangrene on the plantar aspect  Nervous system:  Alert & Oriented X3, no focal deficits  Psychiatric: Normal affect  Skin: No rashes or lesions    LABS:  03-07    126[L]  |  86[L]  |  53[H]  ----------------------------<  121[H]  4.5   |  21[L]  |  4.46[H]    Ca    9.1      07 Mar 2025 09:24  Phos  4.8     03-07  Mg     2.1     03-07      Creatinine Trend: 4.46<--, 3.63<--, 2.35<--, 2.36<--, 2.55<--, 2.23<--      Xray Chest 1 View- PORTABLE-Urgent (Xray Chest 1 View- PORTABLE-Urgent .) (03.07.25 @ 11:32) >  COMPARISON: 2/27/2025    FINDINGS: The heart size cannot be adequately assessed on this single  view. There is a right-sided dialysis catheter with tip in the superior  vena cava. There are trace bilateral pleural effusions and  mild-to-moderate pulmonary edema. No focal consolidation is seen. No  pneumothorax.    IMPRESSION: Trace bilateral pleural effusions and mild-to-moderate  pulmonary edema.       CT Angio Chest PE Protocol w/ IV Cont (03.07.25 @ 11:32) >  IMPRESSION:  *  No pulmonary embolism.  *  Moderate right and small left pleural effusions.  *  Nonspecific peripheral groundglass opacities in the bilateral lungs,  slightly more prominent when compared to prior examination on 1/25/2025.    12 Lead ECG (03.04.25 @ 10:50) >   SINUS TACHYCARDIA  NONSPECIFIC ST AND T WAVE ABNORMALITY  ABNORMAL ECG       Cardiac Catheterization (03.04.25 @ 09:07) >  Conclusions:   Impella placed for HR-PCI (pt was turned down for CABG). EF 25%     Severe proximal LCx stenosis s/p successful rotational atherectomy and balloon angioplasty. Severe LM and proximal LAD stenosis s/p successful rotational atherectomy, balloon angioplasty, and VERA x2.  Recommendations:   Triple therapy for one day and then continue Eliquis and Plavix.

## 2025-03-08 NOTE — CHART NOTE - NSCHARTNOTEFT_GEN_A_CORE
TOMASZ ALBA  Gender: Female  64y  [facility] 2DSU 241 W1    Patient not seen today, chart reviewed.     POCT Blood Glucose:  100 mg/dL (03-08-25 @ 12:22)  99 mg/dL (03-08-25 @ 08:14)  133 mg/dL (03-07-25 @ 21:32)  134 mg/dL (03-07-25 @ 18:36)      eMAR:atorvastatin   40 milliGRAM(s) Oral (03-07-25 @ 21:33)    insulin glargine Injectable (LANTUS)   12 Unit(s) SubCutaneous (03-07-25 @ 21:33)    insulin lispro Injectable (ADMELOG)   5 Unit(s) SubCutaneous (03-08-25 @ 08:35)   5 Unit(s) SubCutaneous (03-07-25 @ 18:41)     POC glucose, insulin requirements, lab values reviewed.   Noted fasting and post prandial BGs at or below goal of 100-180  Will preventively decrease Lantus to 10 units QHS  Continue admelog 5units with each meal, hold if not eating   Continue low admelog correction scales.    Contact via [employer] Teams during business hours  To reach covering provider access AMION via sunrise tools  For Urgent matters/after-hours/weekends/holidays please page endocrine fellow on call   For nonurgent matters please email [email or web address]    Please note that this patient may be followed by different provider tomorrow.  Notify endocrine 24 hours prior to discharge for final recommendations

## 2025-03-08 NOTE — PROGRESS NOTE ADULT - ASSESSMENT
64F, hx of CHF (last TTE on 1/16/25 EF 30-35%, G2DD), DM, diabetic foot ulcer with a recent admission at [facility] for CHF exacerbation 1/14-1/17 p/w worsening R. leg pain and fluid secretion, was meeting sepsis criteria with podiatry having low suspicion for right cellulitis and admitted for continued management of HF exacerbation and needing ischemic eval.     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  -Left leg Arterial Duplex: Slightly tardus parvus waveform of the left popliteal artery is noted, for which underlying disease cannot be excluded. Posterior tibial artery waveform is nonpulsatile. Anterior tibial artery tardus parvus flow is noted.   Transferred to [facility] for CO2 angiogram as per vascular surgery    #  PAD Wound of lower extremity.   ·  Plan: Podiatry following, b/l serous bullae, no concerns for infection  Found to have RLE coolness  -Right Leg Arterial Duplex: Popliteal artery is occluded with negligible flow of right trifurcation arteries.  -Left leg Arterial Duplex: Slightly tardus parvus waveform of the left popliteal artery is noted, for which underlying disease cannot be excluded. Posterior tibial artery waveform is nonpulsatile. Anterior tibial artery tardus parvus flow is noted.  -Started on heparin gtt and dobutamine gtt -Will transfer to [facility] for CO2 angiogram as per vascular surgery as not candidate for CTA due to worsening SCr  -Keep compression dressing  -LE elevation above heart level at rest.  -Transferred to [facility] for CO2 angiogram   - Overall this patient is at   intermediate  risk (for cardiac death, nonfatal myocardial infarction, and nonfatal cardiac arrest perioperatively for this intermediate  risk procedure).   No cardiac contraindications for CO2 vangiogram  There  are  no further recommendation for risk stratifying imaging/stress testing prior to planned surgery  Seen by Podiatry-No acute pod intervention, local wound care only-   PAD with rest ischemia  s/p AT/Popliteal angioplasty on DAPT        # HFrEF (congestive heart failure). Ischemic Cardiomyopathy  ·  Plan: Hx of HF, previous admission in January, on home Lasix 40 qD, Metoprolol Succ 25 qD. Not on Entresto due to insurance issues  Last TTE: LVSF moderately decreased w/ EF 30-35 %. Moderate G2DD. Mild MR  Stress test: small-sized, moderate defect(s) in the apical wall that is predominantly fixed suggestive of an infarction with minimal marcus-infarct ischemia  Ischemic cardiomyopathy  GDMT on hold 2/2 JAMEL  Repeat TTE EF 20% with WMA RAP~~8  Repeat Limited TTE  Taper off Milrinone and start GDMT  Is currently off Hydral/Isdn and Bumex 2/2 soft BP  Continue HD with UF        # CAD  LHC-RCA , severe ostial LM disease, diffuse disease in LAD and LCx, some L to R collaterals-seen by CTS advise cMR for viability poor target vessels for CABG-?High risk LM/LAD PCI  Had cMR-LVEF is 25%, greater than 75% subendocardial scarring involving the basal to mid inferior wall of the left ventricle, left ventricular transmural scarring involving the apical septal wall and likely near transmural scarring of the apical lateral wall. 50% scarring of the left ventricular basal inferoseptal wall.  03/05-s/p PCI-LM/LAD   Continue DAPT        # JAMEL on CKD   Creatinine Trend: 3.63 <--2.35 <--2.55 <--2.23<-- 3.01 <--3.39<--2.65<--2.34<--2.12<--, 1.89<- 3.02<--3.38<--(HD)-- 4.76<--4.07<---3.90<-- 1.17  Has had 3 sessions HD for interrmittent HD to allow contrast based procedures is non oliguric  Plan to continue HD  Srini molina on 03/11

## 2025-03-08 NOTE — PROGRESS NOTE ADULT - SUBJECTIVE AND OBJECTIVE BOX
Name of Patient : TOMASZ ALBA  MRN: [id number]      Subjective: Patient seen and examined. No new events except as noted.   doing okay     REVIEW OF SYSTEMS:    CONSTITUTIONAL: No weakness, fevers or chills  EYES/ENT: No visual changes;  No vertigo or throat pain   NECK: No pain or stiffness  RESPIRATORY: No cough, wheezing, hemoptysis; No shortness of breath  CARDIOVASCULAR: No chest pain or palpitations  GASTROINTESTINAL: No abdominal or epigastric pain. No nausea, vomiting, or hematemesis; No diarrhea or constipation. No melena or hematochezia.  GENITOURINARY: No dysuria, frequency or hematuria  NEUROLOGICAL: No numbness or weakness  SKIN: No itching, burning, rashes, or lesions   All other review of systems is negative unless indicated above.    MEDICATIONS:  MEDICATIONS  (STANDING):  aspirin enteric coated 81 milliGRAM(s) Oral daily  atorvastatin 40 milliGRAM(s) Oral at bedtime  benzocaine/menthol Lozenge 1 Lozenge Oral once  bisacodyl 5 milliGRAM(s) Oral every 12 hours  chlorhexidine 4% Liquid 1 Application(s) Topical <User Schedule>  clopidogrel Tablet 75 milliGRAM(s) Oral daily  clopidogrel Tablet      dextrose 5%. 1000 milliLiter(s) (100 mL/Hr) IV Continuous <Continuous>  dextrose 5%. 1000 milliLiter(s) (50 mL/Hr) IV Continuous <Continuous>  dextrose 50% Injectable 25 Gram(s) IV Push once  dextrose 50% Injectable 12.5 Gram(s) IV Push once  dextrose 50% Injectable 25 Gram(s) IV Push once  glucagon  Injectable 1 milliGRAM(s) IntraMuscular once  heparin   Injectable 5000 Unit(s) SubCutaneous every 8 hours  insulin glargine Injectable (LANTUS) 12 Unit(s) SubCutaneous at bedtime  insulin lispro (ADMELOG) corrective regimen sliding scale   SubCutaneous three times a day before meals  insulin lispro (ADMELOG) corrective regimen sliding scale   SubCutaneous at bedtime  insulin lispro Injectable (ADMELOG) 5 Unit(s) SubCutaneous three times a day with meals  milrinone Infusion 0.125 MICROgram(s)/kG/Min (2.7 mL/Hr) IV Continuous <Continuous>  Nephro-rober 1 Tablet(s) Oral daily  pantoprazole  Injectable 40 milliGRAM(s) IV Push daily  polyethylene glycol 3350 17 Gram(s) Oral daily  senna 2 Tablet(s) Oral at bedtime      PHYSICAL EXAM:  T(C): 36.6 (03-08-25 @ 20:25), Max: 36.9 (03-08-25 @ 01:05)  HR: 107 (03-08-25 @ 20:25) (98 - 112)  BP: 99/62 (03-08-25 @ 20:25) (93/58 - 111/63)  RR: 18 (03-08-25 @ 20:25) (18 - 18)  SpO2: 93% (03-08-25 @ 20:25) (91% - 97%)  Wt(kg): --  I&O's Summary    07 Mar 2025 07:01  -  08 Mar 2025 07:00  --------------------------------------------------------  IN: 240 mL / OUT: 1500 mL / NET: -1260 mL    08 Mar 2025 06:01  -  09 Mar 2025 00:36  --------------------------------------------------------  IN: 360 mL / OUT: 2900 mL / NET: -2540 mL          Appearance: Normal	  HEENT:  PERRLA   Lymphatic: No lymphadenopathy   Cardiovascular: Normal S1 S2, no JVD  Respiratory: normal effort , clear  Gastrointestinal:  Soft, Non-tender  Skin: No rashes,  warm to touch  Psychiatry:  Mood & affect appropriate  Musculuskeletal: No edema    recent labs, Imaging and EKGs personally reviewed       03-07-25 @ 07:01  -  03-08-25 @ 07:00  --------------------------------------------------------  IN: 240 mL / OUT: 1500 mL / NET: -1260 mL    03-08-25 @ 06:01  -  03-09-25 @ 00:36  --------------------------------------------------------  IN: 360 mL / OUT: 2900 mL / NET: -2540 mL                            9.0    9.78  )-----------( 139      ( 08 Mar 2025 07:15 )             25.7               03-08    131[L]  |  94[L]  |  28[H]  ----------------------------<  95  3.8   |  24  |  3.05[H]    Ca    8.4      08 Mar 2025 07:11  Phos  3.3     03-08  Mg     2.0     03-08                         Urinalysis Basic - ( 08 Mar 2025 07:11 )    Color: x / Appearance: x / SG: x / pH: x  Gluc: 95 mg/dL / Ketone: x  / Bili: x / Urobili: x   Blood: x / Protein: x / Nitrite: x   Leuk Esterase: x / RBC: x / WBC x   Sq Epi: x / Non Sq Epi: x / Bacteria: x

## 2025-03-09 LAB
ALBUMIN SERPL ELPH-MCNC: 3 G/DL — LOW (ref 3.3–5)
ALP SERPL-CCNC: 121 U/L — HIGH (ref 40–120)
ALT FLD-CCNC: 16 U/L — SIGNIFICANT CHANGE UP (ref 10–45)
ANION GAP SERPL CALC-SCNC: 16 MMOL/L — SIGNIFICANT CHANGE UP (ref 5–17)
AST SERPL-CCNC: 19 U/L — SIGNIFICANT CHANGE UP (ref 10–40)
BILIRUB DIRECT SERPL-MCNC: 0.3 MG/DL — SIGNIFICANT CHANGE UP (ref 0–0.3)
BILIRUB INDIRECT FLD-MCNC: 0.5 MG/DL — SIGNIFICANT CHANGE UP (ref 0.2–1)
BILIRUB SERPL-MCNC: 0.8 MG/DL — SIGNIFICANT CHANGE UP (ref 0.2–1.2)
BUN SERPL-MCNC: 35 MG/DL — HIGH (ref 7–23)
CALCIUM SERPL-MCNC: 8.6 MG/DL — SIGNIFICANT CHANGE UP (ref 8.4–10.5)
CHLORIDE SERPL-SCNC: 94 MMOL/L — LOW (ref 96–108)
CO2 SERPL-SCNC: 21 MMOL/L — LOW (ref 22–31)
CREAT SERPL-MCNC: 3.84 MG/DL — HIGH (ref 0.5–1.3)
EGFR: 13 ML/MIN/1.73M2 — LOW
EGFR: 13 ML/MIN/1.73M2 — LOW
GLUCOSE BLDC GLUCOMTR-MCNC: 107 MG/DL — HIGH (ref 70–99)
GLUCOSE BLDC GLUCOMTR-MCNC: 134 MG/DL — HIGH (ref 70–99)
GLUCOSE BLDC GLUCOMTR-MCNC: 141 MG/DL — HIGH (ref 70–99)
GLUCOSE BLDC GLUCOMTR-MCNC: 87 MG/DL — SIGNIFICANT CHANGE UP (ref 70–99)
GLUCOSE SERPL-MCNC: 133 MG/DL — HIGH (ref 70–99)
HCT VFR BLD CALC: 28.7 % — LOW (ref 34.5–45)
HGB BLD-MCNC: 10 G/DL — LOW (ref 11.5–15.5)
LACTATE SERPL-SCNC: 1.4 MMOL/L — SIGNIFICANT CHANGE UP (ref 0.5–2)
MCHC RBC-ENTMCNC: 25.4 PG — LOW (ref 27–34)
MCHC RBC-ENTMCNC: 34.8 G/DL — SIGNIFICANT CHANGE UP (ref 32–36)
MCV RBC AUTO: 73 FL — LOW (ref 80–100)
NRBC BLD AUTO-RTO: 0 /100 WBCS — SIGNIFICANT CHANGE UP (ref 0–0)
PLATELET # BLD AUTO: 166 K/UL — SIGNIFICANT CHANGE UP (ref 150–400)
POTASSIUM SERPL-MCNC: 4.5 MMOL/L — SIGNIFICANT CHANGE UP (ref 3.5–5.3)
POTASSIUM SERPL-SCNC: 4.5 MMOL/L — SIGNIFICANT CHANGE UP (ref 3.5–5.3)
PROT SERPL-MCNC: 7.6 G/DL — SIGNIFICANT CHANGE UP (ref 6–8.3)
RBC # BLD: 3.93 M/UL — SIGNIFICANT CHANGE UP (ref 3.8–5.2)
RBC # FLD: 21.9 % — HIGH (ref 10.3–14.5)
SODIUM SERPL-SCNC: 131 MMOL/L — LOW (ref 135–145)
WBC # BLD: 8.88 K/UL — SIGNIFICANT CHANGE UP (ref 3.8–10.5)
WBC # FLD AUTO: 8.88 K/UL — SIGNIFICANT CHANGE UP (ref 3.8–10.5)

## 2025-03-09 PROCEDURE — 93010 ELECTROCARDIOGRAM REPORT: CPT

## 2025-03-09 RX ORDER — HYDROMORPHONE/SOD CHLOR,ISO/PF 2 MG/10 ML
1 SYRINGE (ML) INJECTION EVERY 6 HOURS
Refills: 0 | Status: DISCONTINUED | OUTPATIENT
Start: 2025-03-09 | End: 2025-03-16

## 2025-03-09 RX ORDER — METOPROLOL SUCCINATE 50 MG/1
12.5 TABLET, EXTENDED RELEASE ORAL EVERY 8 HOURS
Refills: 0 | Status: DISCONTINUED | OUTPATIENT
Start: 2025-03-09 | End: 2025-03-10

## 2025-03-09 RX ORDER — INSULIN LISPRO 100 U/ML
3 INJECTION, SOLUTION INTRAVENOUS; SUBCUTANEOUS
Refills: 0 | Status: DISCONTINUED | OUTPATIENT
Start: 2025-03-09 | End: 2025-03-10

## 2025-03-09 RX ORDER — MIDODRINE HYDROCHLORIDE 5 MG/1
10 TABLET ORAL ONCE
Refills: 0 | Status: COMPLETED | OUTPATIENT
Start: 2025-03-10 | End: 2025-03-10

## 2025-03-09 RX ADMIN — Medication 1 APPLICATION(S): at 08:28

## 2025-03-09 RX ADMIN — METOPROLOL SUCCINATE 12.5 MILLIGRAM(S): 50 TABLET, EXTENDED RELEASE ORAL at 17:30

## 2025-03-09 RX ADMIN — Medication 1 TABLET(S): at 12:31

## 2025-03-09 RX ADMIN — Medication 1 MILLIGRAM(S): at 21:00

## 2025-03-09 RX ADMIN — ATORVASTATIN CALCIUM 40 MILLIGRAM(S): 80 TABLET, FILM COATED ORAL at 21:40

## 2025-03-09 RX ADMIN — Medication 650 MILLIGRAM(S): at 17:23

## 2025-03-09 RX ADMIN — Medication 1 MILLIGRAM(S): at 20:09

## 2025-03-09 RX ADMIN — HEPARIN SODIUM 5000 UNIT(S): 1000 INJECTION INTRAVENOUS; SUBCUTANEOUS at 17:35

## 2025-03-09 RX ADMIN — Medication 1 MILLIGRAM(S): at 12:31

## 2025-03-09 RX ADMIN — INSULIN LISPRO 5 UNIT(S): 100 INJECTION, SOLUTION INTRAVENOUS; SUBCUTANEOUS at 08:36

## 2025-03-09 RX ADMIN — Medication 3 MILLIGRAM(S): at 21:40

## 2025-03-09 RX ADMIN — CLOPIDOGREL BISULFATE 75 MILLIGRAM(S): 75 TABLET, FILM COATED ORAL at 12:31

## 2025-03-09 RX ADMIN — INSULIN LISPRO 5 UNIT(S): 100 INJECTION, SOLUTION INTRAVENOUS; SUBCUTANEOUS at 12:34

## 2025-03-09 RX ADMIN — Medication 81 MILLIGRAM(S): at 12:31

## 2025-03-09 RX ADMIN — HEPARIN SODIUM 5000 UNIT(S): 1000 INJECTION INTRAVENOUS; SUBCUTANEOUS at 21:40

## 2025-03-09 RX ADMIN — METOPROLOL SUCCINATE 12.5 MILLIGRAM(S): 50 TABLET, EXTENDED RELEASE ORAL at 08:32

## 2025-03-09 RX ADMIN — Medication 1 MILLIGRAM(S): at 05:16

## 2025-03-09 RX ADMIN — INSULIN GLARGINE-YFGN 12 UNIT(S): 100 INJECTION, SOLUTION SUBCUTANEOUS at 21:40

## 2025-03-09 RX ADMIN — Medication 40 MILLIGRAM(S): at 12:30

## 2025-03-09 RX ADMIN — INSULIN LISPRO 3 UNIT(S): 100 INJECTION, SOLUTION INTRAVENOUS; SUBCUTANEOUS at 17:35

## 2025-03-09 RX ADMIN — HEPARIN SODIUM 5000 UNIT(S): 1000 INJECTION INTRAVENOUS; SUBCUTANEOUS at 05:16

## 2025-03-09 NOTE — PROGRESS NOTE ADULT - ASSESSMENT
64F, hx of CHF (last TTE on 1/16/25 EF 30-35%, G2DD), DM, diabetic foot ulcer with a recent admission at [facility] for CHF exacerbation 1/14-1/17 p/w worsening R. leg pain and fluid secretion, was meeting sepsis criteria with podiatry having low suspicion for right cellulitis and admitted for continued management of HF exacerbation and needing ischemic eval.     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  -Left leg Arterial Duplex: Slightly tardus parvus waveform of the left popliteal artery is noted, for which underlying disease cannot be excluded. Posterior tibial artery waveform is nonpulsatile. Anterior tibial artery tardus parvus flow is noted.   Transferred to [facility] for CO2 angiogram as per vascular surgery    #  PAD Wound of lower extremity.   ·  Plan: Podiatry following, b/l serous bullae, no concerns for infection  Found to have RLE coolness  -Right Leg Arterial Duplex: Popliteal artery is occluded with negligible flow of right trifurcation arteries.  -Left leg Arterial Duplex: Slightly tardus parvus waveform of the left popliteal artery is noted, for which underlying disease cannot be excluded. Posterior tibial artery waveform is nonpulsatile. Anterior tibial artery tardus parvus flow is noted.  -Started on heparin gtt and dobutamine gtt -Will transfer to [facility] for CO2 angiogram as per vascular surgery as not candidate for CTA due to worsening SCr  -Keep compression dressing  -LE elevation above heart level at rest.  -Transferred to [facility] for CO2 angiogram   - Overall this patient is at   intermediate  risk (for cardiac death, nonfatal myocardial infarction, and nonfatal cardiac arrest perioperatively for this intermediate  risk procedure).   No cardiac contraindications for CO2 vangiogram  There  are  no further recommendation for risk stratifying imaging/stress testing prior to planned surgery  Seen by Podiatry-No acute pod intervention, local wound care only-   PAD with rest ischemia  s/p AT/Popliteal angioplasty on DAPT        # HFrEF (congestive heart failure). Ischemic Cardiomyopathy  ·  Plan: Hx of HF, previous admission in January, on home Lasix 40 qD, Metoprolol Succ 25 qD. Not on Entresto due to insurance issues  Last TTE: LVSF moderately decreased w/ EF 30-35 %. Moderate G2DD. Mild MR  Stress test: small-sized, moderate defect(s) in the apical wall that is predominantly fixed suggestive of an infarction with minimal marcus-infarct ischemia  Ischemic cardiomyopathy  GDMT on hold 2/2 JAMEL  Repeat TTE EF 20% with WMA RAP~~8  Repeat Limited TTE  Taper off Milrinone and start GDMT  Is currently off Hydral/Isdn and Bumex 2/2 soft BP  Continue HD with UF had 2900mL removed yesterday will stop Milrinone and observe        # CAD  LHC-RCA , severe ostial LM disease, diffuse disease in LAD and LCx, some L to R collaterals-seen by CTS advise cMR for viability poor target vessels for CABG-?High risk LM/LAD PCI  Had cMR-LVEF is 25%, greater than 75% subendocardial scarring involving the basal to mid inferior wall of the left ventricle, left ventricular transmural scarring involving the apical septal wall and likely near transmural scarring of the apical lateral wall. 50% scarring of the left ventricular basal inferoseptal wall.  03/05-s/p PCI-LM/LAD   Continue DAPT        # JAMEL on CKD   Creatinine Trend: 3.63 <--2.35 <--2.55 <--2.23<-- 3.01 <--3.39<--2.65<--2.34<--2.12<--, 1.89<- 3.02<--3.38<--(HD)-- 4.76<--4.07<---3.90<-- 1.17  Has had 3 sessions HD for interrmittent HD to allow contrast based procedures is non oliguric  Plan to continue HD likely will need extended RRT  Srini molina on 03/11       64F, hx of CHF (last TTE on 1/16/25 EF 30-35%, G2DD), DM, diabetic foot ulcer with a recent admission at [facility] for CHF exacerbation 1/14-1/17 p/w worsening R. leg pain and fluid secretion, was meeting sepsis criteria with podiatry having low suspicion for right cellulitis and admitted for continued management of HF exacerbation and needing ischemic eval.     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  -Left leg Arterial Duplex: Slightly tardus parvus waveform of the left popliteal artery is noted, for which underlying disease cannot be excluded. Posterior tibial artery waveform is nonpulsatile. Anterior tibial artery tardus parvus flow is noted.   Transferred to [facility] for CO2 angiogram as per vascular surgery    #  PAD Wound of lower extremity.   ·  Plan: Podiatry following, b/l serous bullae, no concerns for infection  Found to have RLE coolness  -Right Leg Arterial Duplex: Popliteal artery is occluded with negligible flow of right trifurcation arteries.  -Left leg Arterial Duplex: Slightly tardus parvus waveform of the left popliteal artery is noted, for which underlying disease cannot be excluded. Posterior tibial artery waveform is nonpulsatile. Anterior tibial artery tardus parvus flow is noted.  -Started on heparin gtt and dobutamine gtt -Will transfer to [facility] for CO2 angiogram as per vascular surgery as not candidate for CTA due to worsening SCr  -Keep compression dressing  -LE elevation above heart level at rest.  -Transferred to [facility] for CO2 angiogram   - Overall this patient is at   intermediate  risk (for cardiac death, nonfatal myocardial infarction, and nonfatal cardiac arrest perioperatively for this intermediate  risk procedure).   No cardiac contraindications for CO2 vangiogram  There  are  no further recommendation for risk stratifying imaging/stress testing prior to planned surgery  Seen by Podiatry-No acute pod intervention, local wound care only-   PAD with rest ischemia  s/p AT/Popliteal angioplasty on DAPT        # HFrEF (congestive heart failure). Ischemic Cardiomyopathy  ·  Plan: Hx of HF, previous admission in January, on home Lasix 40 qD, Metoprolol Succ 25 qD. Not on Entresto due to insurance issues  Last TTE: LVSF moderately decreased w/ EF 30-35 %. Moderate G2DD. Mild MR  Stress test: small-sized, moderate defect(s) in the apical wall that is predominantly fixed suggestive of an infarction with minimal marcus-infarct ischemia  Ischemic cardiomyopathy  GDMT on hold 2/2 JAMEL  Repeat TTE EF 20% with WMA RAP~~8  Repeat Limited TTE  Taper off Milrinone and start GDMT  Is currently off Hydral/Isdn and Bumex 2/2 soft BP  Continue HD with UF had 2900mL removed yesterday will stop Milrinone and observe  Start low dose BBlocker Metoprolol tartate 12.5 mg PO q8        # CAD  LHC-RCA , severe ostial LM disease, diffuse disease in LAD and LCx, some L to R collaterals-seen by CTS advise cMR for viability poor target vessels for CABG-?High risk LM/LAD PCI  Had cMR-LVEF is 25%, greater than 75% subendocardial scarring involving the basal to mid inferior wall of the left ventricle, left ventricular transmural scarring involving the apical septal wall and likely near transmural scarring of the apical lateral wall. 50% scarring of the left ventricular basal inferoseptal wall.  03/05-s/p PCI-LM/LAD   Continue DAPT        # JAMEL on CKD   Creatinine Trend: 3.63 <--2.35 <--2.55 <--2.23<-- 3.01 <--3.39<--2.65<--2.34<--2.12<--, 1.89<- 3.02<--3.38<--(HD)-- 4.76<--4.07<---3.90<-- 1.17  Has had 3 sessions HD for interrmittent HD to allow contrast based procedures is non oliguric  Plan to continue HD likely will need extended RRT  Srini change on 03/11

## 2025-03-09 NOTE — PROVIDER CONTACT NOTE (OTHER) - ASSESSMENT
AOx4, pt able to verbalize needs.  Pt denies and headache, dizziness, shortness of breath, headache and chest pain. Pt verbalizes no pain, no acute distress noted. Tachycardia 120's, Bp 103/64, T98.4, RR 18

## 2025-03-09 NOTE — PROGRESS NOTE ADULT - SUBJECTIVE AND OBJECTIVE BOX
[facility] NEPHROLOGY- PROGRESS NOTE    64y Female with history of CHF presents as a transfer for LE CO2 angiogram. Nephrology consulted for elevated Scr.      REVIEW OF SYSTEMS:  Gen: no fevers  Cards: no chest pain  Resp: no dyspnea  GI: no nausea or vomiting or diarrhea  Vascular: + LE edema improving    Augmentin (Stomach Upset; Vomiting; Nausea)  No Known Allergies      Hospital Medications: Medications reviewed    VITALS:  T(F): 98.4 (03-09-25 @ 06:30), Max: 98.6 (03-09-25 @ 04:16)  HR: 120 (03-09-25 @ 08:36)  BP: 104/60 (03-09-25 @ 08:36)  RR: 18 (03-09-25 @ 06:30)  SpO2: 95% (03-09-25 @ 06:30)  Wt(kg): --    03-08 @ 06:01  -  03-09 @ 07:00  --------------------------------------------------------  IN: 360 mL / OUT: 2900 mL / NET: -2540 mL      PHYSICAL EXAM:  Gen: NAD, calm  Cards: RRR, +S1/S2, no M/G/R  Resp: bibasilar rales  GI: soft, NT/ND, NABS  : no stiles  Vascular: + LE wrapped B/L  + RIJ subclavian         LABS:  03-09    131[L]  |  94[L]  |  35[H]  ----------------------------<  133[H]  4.5   |  21[L]  |  3.84[H]    Ca    8.6      09 Mar 2025 06:01  Phos  3.3     03-08  Mg     2.0     03-08    TPro  7.6  /  Alb  3.0[L]  /  TBili  0.8  /  DBili  0.3  /  AST  19  /  ALT  16  /  AlkPhos  121[H]  03-09    Creatinine Trend: 3.84 <--, 3.05 <--, 4.46 <--, 3.63 <--, 2.35 <--, 2.36 <--, 2.55 <--                        10.0   8.88  )-----------( 166      ( 09 Mar 2025 06:01 )             28.7     Urine Studies:  Urinalysis Basic - ( 09 Mar 2025 06:01 )    Color:  / Appearance:  / SG:  / pH:   Gluc: 133 mg/dL / Ketone:   / Bili:  / Urobili:    Blood:  / Protein:  / Nitrite:    Leuk Esterase:  / RBC:  / WBC    Sq Epi:  / Non Sq Epi:  / Bacteria:

## 2025-03-09 NOTE — PROGRESS NOTE ADULT - SUBJECTIVE AND OBJECTIVE BOX
Name of Patient : TOMASZ ALBA  MRN: [id number]  Date of visit: 03-09-25 @ 12:24      Subjective: Patient seen and examined. No new events except as noted.   doing okay  has sinus tachycardia     REVIEW OF SYSTEMS:    CONSTITUTIONAL: No weakness, fevers or chills  EYES/ENT: No visual changes;  No vertigo or throat pain   NECK: No pain or stiffness  RESPIRATORY: No cough, wheezing, hemoptysis; No shortness of breath  CARDIOVASCULAR: No chest pain or palpitations  GASTROINTESTINAL: No abdominal or epigastric pain. No nausea, vomiting, or hematemesis; No diarrhea or constipation. No melena or hematochezia.  GENITOURINARY: No dysuria, frequency or hematuria  NEUROLOGICAL: No numbness or weakness  SKIN: No itching, burning, rashes, or lesions   All other review of systems is negative unless indicated above.    MEDICATIONS:  MEDICATIONS  (STANDING):  aspirin enteric coated 81 milliGRAM(s) Oral daily  atorvastatin 40 milliGRAM(s) Oral at bedtime  benzocaine/menthol Lozenge 1 Lozenge Oral once  bisacodyl 5 milliGRAM(s) Oral every 12 hours  chlorhexidine 4% Liquid 1 Application(s) Topical <User Schedule>  clopidogrel Tablet 75 milliGRAM(s) Oral daily  clopidogrel Tablet      dextrose 5%. 1000 milliLiter(s) (100 mL/Hr) IV Continuous <Continuous>  dextrose 5%. 1000 milliLiter(s) (50 mL/Hr) IV Continuous <Continuous>  dextrose 50% Injectable 25 Gram(s) IV Push once  dextrose 50% Injectable 12.5 Gram(s) IV Push once  dextrose 50% Injectable 25 Gram(s) IV Push once  glucagon  Injectable 1 milliGRAM(s) IntraMuscular once  heparin   Injectable 5000 Unit(s) SubCutaneous every 8 hours  insulin glargine Injectable (LANTUS) 12 Unit(s) SubCutaneous at bedtime  insulin lispro (ADMELOG) corrective regimen sliding scale   SubCutaneous three times a day before meals  insulin lispro (ADMELOG) corrective regimen sliding scale   SubCutaneous at bedtime  insulin lispro Injectable (ADMELOG) 5 Unit(s) SubCutaneous three times a day with meals  metoprolol tartrate 12.5 milliGRAM(s) Oral every 8 hours  Nephro-rober 1 Tablet(s) Oral daily  pantoprazole  Injectable 40 milliGRAM(s) IV Push daily  polyethylene glycol 3350 17 Gram(s) Oral daily  senna 2 Tablet(s) Oral at bedtime      PHYSICAL EXAM:  T(C): 36.9 (03-09-25 @ 06:30), Max: 37 (03-09-25 @ 04:16)  HR: 120 (03-09-25 @ 08:36) (98 - 122)  BP: 104/60 (03-09-25 @ 08:36) (99/62 - 111/63)  RR: 18 (03-09-25 @ 06:30) (18 - 18)  SpO2: 95% (03-09-25 @ 06:30) (93% - 97%)  Wt(kg): --  I&O's Summary    08 Mar 2025 06:01  -  09 Mar 2025 07:00  --------------------------------------------------------  IN: 360 mL / OUT: 2900 mL / NET: -2540 mL          Appearance: Normal	  HEENT:  PERRLA   Lymphatic: No lymphadenopathy   Cardiovascular: Normal S1 S2, no JVD  Respiratory: normal effort , clear  Gastrointestinal:  Soft, Non-tender  Skin: No rashes,  warm to touch  Psychiatry:  Mood & affect appropriate  Musculuskeletal: Le dressing     recent labs, Imaging and EKGs personally reviewed   03-08-25 @ 06:01  -  03-09-25 @ 07:00  --------------------------------------------------------  IN: 360 mL / OUT: 2900 mL / NET: -2540 mL                          10.0   8.88  )-----------( 166      ( 09 Mar 2025 06:01 )             28.7               03-09    131[L]  |  94[L]  |  35[H]  ----------------------------<  133[H]  4.5   |  21[L]  |  3.84[H]    Ca    8.6      09 Mar 2025 06:01  Phos  3.3     03-08  Mg     2.0     03-08    TPro  7.6  /  Alb  3.0[L]  /  TBili  0.8  /  DBili  0.3  /  AST  19  /  ALT  16  /  AlkPhos  121[H]  03-09                       Urinalysis Basic - ( 09 Mar 2025 06:01 )    Color: x / Appearance: x / SG: x / pH: x  Gluc: 133 mg/dL / Ketone: x  / Bili: x / Urobili: x   Blood: x / Protein: x / Nitrite: x   Leuk Esterase: x / RBC: x / WBC x   Sq Epi: x / Non Sq Epi: x / Bacteria: x

## 2025-03-09 NOTE — PROGRESS NOTE ADULT - ASSESSMENT
64y Female with history of CHF presents as a transfer for LE CO2 angiogram. Nephrology consulted for elevated Scr.    1) JAMEL: likely due to ATN and CRS for which patient initiated on RRT on 2/20. s/p HD 3/7 tolerated well with net 1.5L removed.  Scr increasing likely due to contrast nephropathy. Pt with b/l pleural effusions on CTA on 3/7 for which pt had a 2 hr PUF 3/8 with 1.4L removed. Plan for next HD 3/10. Bladder scan 391 today; repeat bladder scan later today. If retaining; please place stiles. Will monitor for renal recovery. Will plan for IR TDC placement on 3/11, if no signs of recovery. Milrinone gtt d/c as per Cards.  Avoid nephrotoxins.    2) HTN: BP low normal. Pt off isordil. Monitor BP.    3) LE edema: Pt off bumex 4 mg IV daily. Pt unable to urinate and will likely need stiles. UF with HD. TTE with severely decreased LVSF. Monitor UO.     4) Anemia: Hb improving with low TSAT. No IV iron given elevated ferritin. S/P Epo 8K X 1 dose 3/1. Monitor Hb.    5) Hyponatremia: Secondary to JAMEL and polydipsia. Will correct with HD. Continue with 1L [facility] NEPHROLOGY  Raji Waterman M.D.  Mars Feliz D.O.  Kelley Parks M.D.  MD Nancy Kulkarni, MSN, ANP-C    Telephone: [phone]  Facsimile: [phone] [place] [address], [address]  [place] [address]

## 2025-03-09 NOTE — PROGRESS NOTE ADULT - ASSESSMENT
63 YO F with PMHx of HFrEF 30-35 and grade 2 ddfxn (last TTE on 01/16/25), DM2, and diabetic foot ulcer. Recent admission at [facility] for ADHF. Patient now represents to OSH and ultimately to [facility] as a transfer for worsening right leg pain and fluid secretion. As per documentation, patient was reported to have severe depletion of RLE blood flow beyond the popliteal vessel at knee and similar findings in the left. Patient was transferred to [facility] for CO2 angiogram with Dr. Baltazar. Of note, patient also with reported visual disturbance for which patient was seen and evaluated by opthalmology. Internal Medicine has been consulted on Ms. Kirby's care for medical management.     # ADHF/ BiV HFrEF 20   - Recent admission at [facility] for ADHF and on dobutamine outpatient  - TTE 1/16 with EF 30-35 with moderately reduced LVSF and grade 2 ddfxn, normal RVSF , trace TR/ MO, and trace pericardial effusion  - RPT TTE with EF 20, severely decreased LV, decreased RVSF with TAPSE 1.2, and regional wall motion abnormalities present with entire septum, entire apex, entire inferior wall, mid inferolateral segment, and mid anterolateral segment are hypokinetic.   - RHC with RA 20, PA 49/29 (40), PCWP 35, mVO2 51.1, CO/CI 3.8/2.2, SVR 1095 w/ Multivessel CAD   - s/p zaroxyln 10 QD to augment diuresis   - s/p bumex GTT   - s/p HTS to augment diuresis   - RPT limited TTE w/ LVSF severely decreased, reduced RVSF, LVOT VTI 12, mild to mod TR, and mildly elevated right atrial pressure  - Case discussed with EP and needs to be on GDMT for 3 months before AICD placement and should be stabilized off of inotropic support.   - Continue on bumex 4 IV QD but anuric and will DC  - Continue on milrinone gtt as per Cardio   - Continue on hydral 25 and isordil 5, however BP running soft and hydral turned off. IF BP continues to run soft may need to hold isordil as well.   - Pending RPT ECHO post cath  - HF and cards following and adjusting medications   - Monitor I and O, diuresis per Cardio/ Renal    - Monitor volume status   - Check daily weights    - Monitor on telemetry; Monitor electrolytes   - Cardio, HF, Renal, and EP evals appreciated; F/u recs     # Tachycardia and Hypoxia  - Tachycardiac and on RA with SPO2 running 90-92   - Could be second to low cardiac output   - CTA with no PE  - Monitor closely on Tele    # CAD with TVD   - NST abnormal with small-sized, moderate defect in apical wall that is predominantly fixed suggestive of an infarction with minimal marcus-infarct ischemia. Post stress LVEF 25.  - s/p LHC with RCA , severe ostial LM disease, diffuse disease in LAD and LCx, some L to R collaterals (Multivessel CAD)  - Case discussed with CTSx and NOT a candidate for CABG due to comorbidities  - Cardiac MRI with greater than 75% subendocardial scarring of the basal to mid-inferior wall of the LV and 50% scarring of the left ventricular basal inferoseptal wall. There is also left ventricular transmural scarring involving the apical septal wall and likely near transmural scarring of the apical lateral wall.  - s/p RPT LHC 3/4 with LM 80 s/p POBA/ VERA with LM 1, pLAD 90 s/p POBA/ VERA with pLAD 1, and Cx 80 s/p POBA with Cx 10.   - Continue on DAPT and Statin   - Pending RPT ECHO  - IC, Cards and HF following     # BL LE Edema likely in setting of ADHF  - Remove volume as per Cardio, HF and Renal   - BL LE elevation and compression  - LE Duplex neg   - Monitor for now    # Pericardial effusion likely in setting ADHF  - TTE with trace pericardial effusion   - CT Chest with small pericardial effusion   - Denies chest pain, palpitations, chest tightness or discomfort, shortness of breath or dyspnea   - Repeat TTE in 1 month for further evaluation   - Diuresis per cardio/ renal.  - Monitor on telemetry  - Cardio following    # Pleural effusion likely in setting ADHF  - CT Chest with small BL pleural effusions  - Monitor O2 saturation  - Supplement to maintain > 90%   - Diuresis per cardio/ renal.     # JAMEL with Hyponatremia and Metabolic Acidosis   - Baseline CRE 0.7-1.2 and increased to ~4  - As per OSH documentation, JAMEL was thought to be second to Unasyn/ Bumex / Entresto use and Hypotension   - US RENAL with no hydronephrosis  - Likely cardiorenal induced with low flow state in ADHF, however now rising again and concern for milrinone vs overdiuresis (prerenal) vs cardiorenal.   - Milrinone adjusted and diuresis turned off, however no improvement/ worsened in renal function noted.   - s/p shiley placement and started on HD 2/20  - Last HD on 3/3, attempted to monitor off, however BUN/ CRE rapidly increasing.   - Per discussion with renal will likely need long term HD with long term access and IR consulted for shiley to permacath conversion.   - Plan for HD today   - HF and renal following   - Avoid nephrotoxic agents  - Monitor Cr and daily BMP     # Hypernatremia to hyponatremia  - Hypernatremia likely from HTS vs over diuresis/ intravascular dehydration. HTS and diuresis stopped with improved sodium   - Hyponatremia now noted second to volume overload   - Avoid overcorrection > 6-8 mEq in 24 hours  - Monitor sodium with HD    # Transaminitis  - Likely 2/2 hepatic congestion   - Volume removal with HD as tolerated  - Trend LFTs, if uptrend post-HD initiation, check ABD US  - Serial ABD Examinations      # Leukocytosis likely in setting of BL LE ulcers with pop occlusion   - BCx negative   - UCx with probable contamination   - S/P unasyn for ABX.   - Leukocytosis fluctuating, however appears nontoxic with no fever spikes and monitoring closely off ABX  - If febrile check pan cultures   - Trend CBC, temp curve, VS and adjust as tolerated    # Foot ulcers with worsening RLE pain second to pop artery occlusion +/- diabetic ulcers   - RLE Arterial Duplex with popliteal artery occlusion   - LLE Arterial Duplex with underlying disease cannot be excluded   - s/p angio with pop and AT VERA on 2/24   - Continue on Lipitor and DAPT  - Continue pain control with oxy  - Wound care called for dressing recs   - Vascular following,     # Visual Disturbance  - CT Head w/ old infarcts  - On ASA and Statin   - Opthalmology eval appreciated    # Indigestion and Constipation   - PPI, miralax and senna continued  - Monitor BMs    # Anemia likely mixed AOCD vs iron deficiency   - HH stable in 9s on HSQ  - Anemia panel with AOCD   - Started on venofer, but ferritin high and now stopped   - Continue on EPO with HD per renal  - Monitor HH   - Transfuse for Hgb < 8  - Maintain active T/S    # Diabetes Mellitus A1C 11.6   - Continue on lantus 13 with lispro 5 and ISS   - Diabetic DASH diet   - Monitor and adjust glucose levels PRN   - Endocrine following; F/u recs     # HLD and HLD  - Continue on lipitor and toprol  - Monitor BP    # DISPO TBD  - Palliative care called and patient remains FULL CODE

## 2025-03-09 NOTE — PROGRESS NOTE ADULT - SUBJECTIVE AND OBJECTIVE BOX
MR#[id number]  PATIENT NAME:COLE ALBA    DATE OF SERVICE: 03-09-25 @ 07:17  Patient was seen and examined by Yordy Gilmore MD on    03-09-25 @ 07:17 .  Interim events noted.Consultant notes ,Labs,Telemetry reviewed by me       HOSPITAL COURSE: HPI:  64F, hx of CHF (last TTE on 1/16/25 EF 30-35%, G2DD), DM, diabetic foot ulcer with a recent admission at [facility] for CHF exacerbation presented as a transfer for worsening R. leg pain and fluid secretion, On non-invasive imaging demonstrating severe depletion of RLE blood flow beyond the popliteal vessel at knee and similar findings in L. Was transferred to [facility] for CO2 angiogram with Dr. Baltazar. (29 Jan 2025 20:05)      INTERIM EVENTS:Patient seen at bedside ,interim events noted.  02/14-Had cath Multivessel CAD started on Milrinone for CTS evaluation albeit targets not optimal  02/15-Awake on Milrinone and Bumex gtt-seen by CTS not a surgical candidate-needs Vascular work-up for LE PAD-scheduled for Angiogram on Wednesday    Weight 170Kg---> 164 Kg--->161.1 Kg---151.2 ---> 155 --> 154.7 ---> 158 --> 148--->147 >148 --> 146 --> 141 Kg    02/25-s/p RLE angiogram with pop and AT angioplasty   02/27-Awake had iHD plan for High risk PCI oLM/LAD tomorrow 1000mL removed  Had ? pAF ~~3 secs  02/28-Awake no dyspnea chest pain plan for PCI-oLM/LAD todat HD after PCI-Sinus rhythm  03/01-Had LHC PCWP-15 still elevated with low BP Plan to dialyse over weekend and plan for PCI on Monday-No dyspnea  03/02-Awake had HD removed 1500Ml no dyspnea Plan for PCI tomorrow  03/03-Awake Sinus rhythm no dyspnea FOR High risk PCI oLM/LAD   03/04-Awake had HD last night-1500mL removed For High risk PCI today  03/05-Awake s/p LHC VERA x 1 to LAD, VERA to LM , POBA to CX via RFA  03/06-Awake no dyspnea chest pain  03/07-Awake poor UOP plan to continue HD  03/08-Awake Sinus Rhythm CTPA No PE Had HD 1500mL removed  03/09 Awake no dyspnea Sinus Rhythm-Had HD 2900  mL removed    PMH -reviewed admission note, no change since admission  HEART FAILURE: Acute[x ]Chronic[ ] Systolic[x ] Diastolic[ ] Combined Systolic and Diastolic[ ]  CAD[x ] CABG[ ] PCI[ ]  DEVICES[ ] PPM[ ] ICD[ ] ILR[ ]  ATRIAL FIBRILLATION[ ] Paroxysmal[ ] Permanent[ ] CHADS2-[  ]  JAMEL[x ] CKD1[ ] CKD2[ ] CKD3[ ] CKD4[x ] ESRD[ ]  COPD[ ] HTN[x ]   DM[x ] Type1[ ] Type 2[x ]   CVA[ ] Paresis[ ]    AMBULATION: Assisted[x ] Cane/walker[ ] Independent[ ]              MEDICATIONS  (STANDING):  aspirin enteric coated 81 milliGRAM(s) Oral daily  atorvastatin 40 milliGRAM(s) Oral at bedtime  benzocaine/menthol Lozenge 1 Lozenge Oral once  bisacodyl 5 milliGRAM(s) Oral every 12 hours  chlorhexidine 4% Liquid 1 Application(s) Topical <User Schedule>  clopidogrel Tablet 75 milliGRAM(s) Oral daily  clopidogrel Tablet      heparin   Injectable 5000 Unit(s) SubCutaneous every 8 hours  insulin glargine Injectable (LANTUS) 12 Unit(s) SubCutaneous at bedtime  insulin lispro (ADMELOG) corrective regimen sliding scale   SubCutaneous three times a day before meals  insulin lispro (ADMELOG) corrective regimen sliding scale   SubCutaneous at bedtime  insulin lispro Injectable (ADMELOG) 5 Unit(s) SubCutaneous three times a day with meals  milrinone Infusion 0.125 MICROgram(s)/kG/Min (2.7 mL/Hr) IV Continuous <Continuous>  Nephro-rober 1 Tablet(s) Oral daily  pantoprazole  Injectable 40 milliGRAM(s) IV Push daily  polyethylene glycol 3350 17 Gram(s) Oral daily  senna 2 Tablet(s) Oral at bedtime    MEDICATIONS  (PRN):  acetaminophen     Tablet .. 650 milliGRAM(s) Oral every 6 hours PRN Mild Pain (1 - 3)  dextrose Oral Gel 15 Gram(s) Oral once PRN Blood Glucose LESS THAN 70 milliGRAM(s)/deciliter  HYDROmorphone  Injectable 1 milliGRAM(s) IV Push every 6 hours PRN Severe Pain (7 - 10)  melatonin 3 milliGRAM(s) Oral at bedtime PRN Insomnia  ondansetron Injectable 4 milliGRAM(s) IV Push every 6 hours PRN Nausea and/or Vomiting  sodium chloride 0.65% Nasal 1 Spray(s) Both Nostrils three times a day PRN Dryness  sodium chloride 0.9% lock flush 10 milliLiter(s) IV Push every 1 hour PRN Pre/post blood products, medications, blood draw, and to maintain line patency            REVIEW OF SYSTEMS:  Constitutional: [ ] fever, [ ]weight loss,  [ x]fatigue [ ]weight gain  Eyes: [ ] visual changes  Respiratory: [ ]shortness of breath;  [ ] cough, [ ]wheezing, [ ]chills, [ ]hemoptysis  Cardiovascular: [ ] chest pain, [ ]palpitations, [ ]dizziness,  [x ]leg swelling[ ]orthopnea[ ]PND  Gastrointestinal: [ ] abdominal pain, [ ]nausea, [ ]vomiting,  [ ]diarrhea [ ]Constipation [ ]Melena  Genitourinary: [ ] dysuria, [ ] hematuria [ ]Montgomery  Neurologic: [ ] headaches [ ] tremors[ ]weakness [ ]Paralysis Right[ ] Left[ ]  Skin: [ ] itching, [ ]burning, [ ] rashes  Endocrine: [ ] heat or cold intolerance  Musculoskeletal: [ ] joint pain or swelling; [ ] muscle, back, or extremity pain  Psychiatric: [ ] depression, [ ]anxiety, [ ]mood swings, or [ ]difficulty sleeping  Hematologic: [ ] easy bruising, [ ] bleeding gums    [ ] All remaining systems negative except as per above.   [ ]Unable to obtain.  [x] No change in ROS since admission      Vital Signs Last 24 Hrs  T(C): 36.9 (09 Mar 2025 06:30), Max: 37 (09 Mar 2025 04:16)  T(F): 98.4 (09 Mar 2025 06:30), Max: 98.6 (09 Mar 2025 04:16)  HR: 122 (09 Mar 2025 06:30) (98 - 122)  BP: 103/64 (09 Mar 2025 06:30) (99/61 - 111/63)  RR: 18 (09 Mar 2025 06:30) (18 - 18)  SpO2: 95% (09 Mar 2025 06:30) (93% - 97%)    Parameters below as of 09 Mar 2025 06:30  Patient On (Oxygen Delivery Method): room air      I&O's Summary    08 Mar 2025 06:01  -  09 Mar 2025 07:00  --------------------------------------------------------  IN: 360 mL / OUT: 2900 mL / NET: -2540 mL        PHYSICAL EXAM:  General: No acute distress BMI-24  HEENT: EOMI, PERRL  Neck: Supple, [ ] JVD  Lungs: Equal air entry bilaterally; [ ] rales [ ] wheezing [ ] rhonchi  Heart: Regular rate and rhythm; [x ] murmur   2/6 [ x] systolic [ ] diastolic [ ] radiation[ ] rubs [ ]  gallops  Abdomen: Nontender, bowel sounds present  Extremities: No clubbing, cyanosis, [x ] edema [x ]]Warm, well perfused        RLE: Dressings in place, blistering and ulceration on the dorsal aspect of the foot        LLE: First digit dry gangrene on the plantar aspect  Nervous system:  Alert & Oriented X3, no focal deficits  Psychiatric: Normal affect  Skin: No rashes or lesions    LABS:  03-09    131[L]  |  94[L]  |  35[H]  ----------------------------<  133[H]  4.5   |  21[L]  |  3.84[H]    Ca    8.6      09 Mar 2025 06:01  Phos  3.3     03-08  Mg     2.0     03-08    TPro  7.6  /  Alb  3.0[L]  /  TBili  0.8  /  DBili  0.3  /  AST  19  /  ALT  16  /  AlkPhos  121[H]  03-09    Creatinine Trend: 3.84<--, 3.05<--, 4.46<--, 3.63<--, 2.35<--, 2.36<--                        10.0   8.88  )-----------( 166      ( 09 Mar 2025 06:01 )             28.7           Xray Chest 1 View- PORTABLE-Urgent (Xray Chest 1 View- PORTABLE-Urgent .) (03.07.25 @ 11:32) >  COMPARISON: 2/27/2025    FINDINGS: The heart size cannot be adequately assessed on this single  view. There is a right-sided dialysis catheter with tip in the superior  vena cava. There are trace bilateral pleural effusions and  mild-to-moderate pulmonary edema. No focal consolidation is seen. No  pneumothorax.    IMPRESSION: Trace bilateral pleural effusions and mild-to-moderate  pulmonary edema.       CT Angio Chest PE Protocol w/ IV Cont (03.07.25 @ 11:32) >  IMPRESSION:  *  No pulmonary embolism.  *  Moderate right and small left pleural effusions.  *  Nonspecific peripheral groundglass opacities in the bilateral lungs,  slightly more prominent when compared to prior examination on 1/25/2025.    12 Lead ECG (03.04.25 @ 10:50) >   SINUS TACHYCARDIA  NONSPECIFIC ST AND T WAVE ABNORMALITY  ABNORMAL ECG       Cardiac Catheterization (03.04.25 @ 09:07) >  Conclusions:   Impella placed for HR-PCI (pt was turned down for CABG). EF 25%     Severe proximal LCx stenosis s/p successful rotational atherectomy and balloon angioplasty.   Severe LM and proximal LAD stenosis s/p successful rotational atherectomy, balloon angioplasty, and VERA x2.  Recommendations:   Triple therapy for one day and then continue Eliquis and Plavix.           MR#[id number]  PATIENT NAME:COLE ALBA    DATE OF SERVICE: 03-09-25 @ 07:17  Patient was seen and examined by Yordy Gilmore MD on    03-09-25 @ 07:17 .  Interim events noted.Consultant notes ,Labs,Telemetry reviewed by me       HOSPITAL COURSE: HPI:  64F, hx of CHF (last TTE on 1/16/25 EF 30-35%, G2DD), DM, diabetic foot ulcer with a recent admission at [facility] for CHF exacerbation presented as a transfer for worsening R. leg pain and fluid secretion, On non-invasive imaging demonstrating severe depletion of RLE blood flow beyond the popliteal vessel at knee and similar findings in L. Was transferred to [facility] for CO2 angiogram with Dr. Baltazar. (29 Jan 2025 20:05)      INTERIM EVENTS:Patient seen at bedside ,interim events noted.  02/14-Had cath Multivessel CAD started on Milrinone for CTS evaluation albeit targets not optimal  02/15-Awake on Milrinone and Bumex gtt-seen by CTS not a surgical candidate-needs Vascular work-up for LE PAD-scheduled for Angiogram on Wednesday    Weight 170Kg---> 164 Kg--->161.1 Kg---151.2 ---> 155 --> 154.7 ---> 158 --> 148--->147 >148 --> 146 --> 141 Kg    02/25-s/p RLE angiogram with pop and AT angioplasty   02/27-Awake had iHD plan for High risk PCI oLM/LAD tomorrow 1000mL removed  Had ? pAF ~~3 secs  02/28-Awake no dyspnea chest pain plan for PCI-oLM/LAD todat HD after PCI-Sinus rhythm  03/01-Had LHC PCWP-15 still elevated with low BP Plan to dialyse over weekend and plan for PCI on Monday-No dyspnea  03/02-Awake had HD removed 1500Ml no dyspnea Plan for PCI tomorrow  03/03-Awake Sinus rhythm no dyspnea FOR High risk PCI oLM/LAD   03/04-Awake had HD last night-1500mL removed For High risk PCI today  03/05-Awake s/p LHC VERA x 1 to LAD, VERA to LM , POBA to CX via RFA  03/06-Awake no dyspnea chest pain  03/07-Awake poor UOP plan to continue HD  03/08-Awake Sinus Rhythm CTPA No PE Had HD 1500mL removed  03/09 Awake no dyspnea Sinus Rhythm-Had HD 2900  mL removed noted to be Tachy -Sinus~~120's    PMH -reviewed admission note, no change since admission  HEART FAILURE: Acute[x ]Chronic[ ] Systolic[x ] Diastolic[ ] Combined Systolic and Diastolic[ ]  CAD[x ] CABG[ ] PCI[ ]  DEVICES[ ] PPM[ ] ICD[ ] ILR[ ]  ATRIAL FIBRILLATION[ ] Paroxysmal[ ] Permanent[ ] CHADS2-[  ]  JAMEL[x ] CKD1[ ] CKD2[ ] CKD3[ ] CKD4[x ] ESRD[ ]  COPD[ ] HTN[x ]   DM[x ] Type1[ ] Type 2[x ]   CVA[ ] Paresis[ ]    AMBULATION: Assisted[x ] Cane/walker[ ] Independent[ ]              MEDICATIONS  (STANDING):  aspirin enteric coated 81 milliGRAM(s) Oral daily  atorvastatin 40 milliGRAM(s) Oral at bedtime  benzocaine/menthol Lozenge 1 Lozenge Oral once  bisacodyl 5 milliGRAM(s) Oral every 12 hours  chlorhexidine 4% Liquid 1 Application(s) Topical <User Schedule>  clopidogrel Tablet 75 milliGRAM(s) Oral daily  clopidogrel Tablet      heparin   Injectable 5000 Unit(s) SubCutaneous every 8 hours  insulin glargine Injectable (LANTUS) 12 Unit(s) SubCutaneous at bedtime  insulin lispro (ADMELOG) corrective regimen sliding scale   SubCutaneous three times a day before meals  insulin lispro (ADMELOG) corrective regimen sliding scale   SubCutaneous at bedtime  insulin lispro Injectable (ADMELOG) 5 Unit(s) SubCutaneous three times a day with meals  milrinone Infusion 0.125 MICROgram(s)/kG/Min (2.7 mL/Hr) IV Continuous <Continuous>  Nephro-rober 1 Tablet(s) Oral daily  pantoprazole  Injectable 40 milliGRAM(s) IV Push daily  polyethylene glycol 3350 17 Gram(s) Oral daily  senna 2 Tablet(s) Oral at bedtime    MEDICATIONS  (PRN):  acetaminophen     Tablet .. 650 milliGRAM(s) Oral every 6 hours PRN Mild Pain (1 - 3)  dextrose Oral Gel 15 Gram(s) Oral once PRN Blood Glucose LESS THAN 70 milliGRAM(s)/deciliter  HYDROmorphone  Injectable 1 milliGRAM(s) IV Push every 6 hours PRN Severe Pain (7 - 10)  melatonin 3 milliGRAM(s) Oral at bedtime PRN Insomnia  ondansetron Injectable 4 milliGRAM(s) IV Push every 6 hours PRN Nausea and/or Vomiting  sodium chloride 0.65% Nasal 1 Spray(s) Both Nostrils three times a day PRN Dryness  sodium chloride 0.9% lock flush 10 milliLiter(s) IV Push every 1 hour PRN Pre/post blood products, medications, blood draw, and to maintain line patency            REVIEW OF SYSTEMS:  Constitutional: [ ] fever, [ ]weight loss,  [ x]fatigue [ ]weight gain  Eyes: [ ] visual changes  Respiratory: [ ]shortness of breath;  [ ] cough, [ ]wheezing, [ ]chills, [ ]hemoptysis  Cardiovascular: [ ] chest pain, [ ]palpitations, [ ]dizziness,  [x ]leg swelling[ ]orthopnea[ ]PND  Gastrointestinal: [ ] abdominal pain, [ ]nausea, [ ]vomiting,  [ ]diarrhea [ ]Constipation [ ]Melena  Genitourinary: [ ] dysuria, [ ] hematuria [ ]Mont[place]  Neurologic: [ ] headaches [ ] tremors[ ]weakness [ ]Paralysis Right[ ] Left[ ]  Skin: [ ] itching, [ ]burning, [ ] rashes  Endocrine: [ ] heat or cold intolerance  Musculoskeletal: [ ] joint pain or swelling; [ ] muscle, back, or extremity pain  Psychiatric: [ ] depression, [ ]anxiety, [ ]mood swings, or [ ]difficulty sleeping  Hematologic: [ ] easy bruising, [ ] bleeding gums    [ ] All remaining systems negative except as per above.   [ ]Unable to obtain.  [x] No change in ROS since admission      Vital Signs Last 24 Hrs  T(C): 36.9 (09 Mar 2025 06:30), Max: 37 (09 Mar 2025 04:16)  T(F): 98.4 (09 Mar 2025 06:30), Max: 98.6 (09 Mar 2025 04:16)  HR: 122 (09 Mar 2025 06:30) (98 - 122)  BP: 103/64 (09 Mar 2025 06:30) (99/61 - 111/63)  RR: 18 (09 Mar 2025 06:30) (18 - 18)  SpO2: 95% (09 Mar 2025 06:30) (93% - 97%)    Parameters below as of 09 Mar 2025 06:30  Patient On (Oxygen Delivery Method): room air      I&O's Summary    08 Mar 2025 06:01  -  09 Mar 2025 07:00  --------------------------------------------------------  IN: 360 mL / OUT: 2900 mL / NET: -2540 mL        PHYSICAL EXAM:  General: No acute distress BMI-24  HEENT: EOMI, PERRL  Neck: Supple, [ ] JVD  Lungs: Equal air entry bilaterally; [ ] rales [ ] wheezing [ ] rhonchi  Heart: Regular rate and rhythm; [x ] murmur   2/6 [ x] systolic [ ] diastolic [ ] radiation[ ] rubs [ ]  gallops  Abdomen: Nontender, bowel sounds present  Extremities: No clubbing, cyanosis, [x ] edema [x ]]Warm, well perfused        RLE: Dressings in place, blistering and ulceration on the dorsal aspect of the foot        LLE: First digit dry gangrene on the plantar aspect  Nervous system:  Alert & Oriented X3, no focal deficits  Psychiatric: Normal affect  Skin: No rashes or lesions    LABS:  03-09    131[L]  |  94[L]  |  35[H]  ----------------------------<  133[H]  4.5   |  21[L]  |  3.84[H]    Ca    8.6      09 Mar 2025 06:01  Phos  3.3     03-08  Mg     2.0     03-08    TPro  7.6  /  Alb  3.0[L]  /  TBili  0.8  /  DBili  0.3  /  AST  19  /  ALT  16  /  AlkPhos  121[H]  03-09    Creatinine Trend: 3.84<--, 3.05<--, 4.46<--, 3.63<--, 2.35<--, 2.36<--                        10.0   8.88  )-----------( 166      ( 09 Mar 2025 06:01 )             28.7           Xray Chest 1 View- PORTABLE-Urgent (Xray Chest 1 View- PORTABLE-Urgent .) (03.07.25 @ 11:32) >  COMPARISON: 2/27/2025    FINDINGS: The heart size cannot be adequately assessed on this single  view. There is a right-sided dialysis catheter with tip in the superior  vena cava. There are trace bilateral pleural effusions and  mild-to-moderate pulmonary edema. No focal consolidation is seen. No  pneumothorax.    IMPRESSION: Trace bilateral pleural effusions and mild-to-moderate  pulmonary edema.       CT Angio Chest PE Protocol w/ IV Cont (03.07.25 @ 11:32) >  IMPRESSION:  *  No pulmonary embolism.  *  Moderate right and small left pleural effusions.  *  Nonspecific peripheral groundglass opacities in the bilateral lungs,  slightly more prominent when compared to prior examination on 1/25/2025.    12 Lead ECG (03.04.25 @ 10:50) >   SINUS TACHYCARDIA  NONSPECIFIC ST AND T WAVE ABNORMALITY  ABNORMAL ECG       Cardiac Catheterization (03.04.25 @ 09:07) >  Conclusions:   Impella placed for HR-PCI (pt was turned down for CABG). EF 25%     Severe proximal LCx stenosis s/p successful rotational atherectomy and balloon angioplasty.   Severe LM and proximal LAD stenosis s/p successful rotational atherectomy, balloon angioplasty, and VERA x2.  Recommendations:   Triple therapy for one day and then continue Eliquis and Plavix.

## 2025-03-09 NOTE — PROVIDER CONTACT NOTE (OTHER) - ASSESSMENT
AOx4, pt able to verbalize needs.  Pt denies and headache, dizziness, shortness of breath, headache and chest pain. Pt verbalizes no pain, no acute distress noted. AOx4, pt able to verbalize needs.  Pt denies and headache, dizziness, shortness of breath, headache and chest pain. Pt verbalizes no pain, no acute distress noted. No pelvis discomfort or distention noted

## 2025-03-09 NOTE — PROVIDER CONTACT NOTE (OTHER) - ACTION/TREATMENT ORDERED:
Provider made aware, EKG taken and sent for review to provider. Awaiting orders. AM NEVAEH Merlos made aware.

## 2025-03-10 ENCOUNTER — RESULT REVIEW (OUTPATIENT)
Age: 65
End: 2025-03-10

## 2025-03-10 LAB
ANION GAP SERPL CALC-SCNC: 17 MMOL/L — SIGNIFICANT CHANGE UP (ref 5–17)
BUN SERPL-MCNC: 43 MG/DL — HIGH (ref 7–23)
CALCIUM SERPL-MCNC: 8.9 MG/DL — SIGNIFICANT CHANGE UP (ref 8.4–10.5)
CHLORIDE SERPL-SCNC: 95 MMOL/L — LOW (ref 96–108)
CO2 SERPL-SCNC: 20 MMOL/L — LOW (ref 22–31)
CREAT SERPL-MCNC: 4.4 MG/DL — HIGH (ref 0.5–1.3)
EGFR: 11 ML/MIN/1.73M2 — LOW
EGFR: 11 ML/MIN/1.73M2 — LOW
GAS PNL BLDV: SIGNIFICANT CHANGE UP
GLUCOSE BLDC GLUCOMTR-MCNC: 102 MG/DL — HIGH (ref 70–99)
GLUCOSE BLDC GLUCOMTR-MCNC: 173 MG/DL — HIGH (ref 70–99)
GLUCOSE BLDC GLUCOMTR-MCNC: 198 MG/DL — HIGH (ref 70–99)
GLUCOSE BLDC GLUCOMTR-MCNC: 217 MG/DL — HIGH (ref 70–99)
GLUCOSE BLDC GLUCOMTR-MCNC: 264 MG/DL — HIGH (ref 70–99)
GLUCOSE BLDC GLUCOMTR-MCNC: 87 MG/DL — SIGNIFICANT CHANGE UP (ref 70–99)
GLUCOSE BLDC GLUCOMTR-MCNC: 88 MG/DL — SIGNIFICANT CHANGE UP (ref 70–99)
GLUCOSE SERPL-MCNC: 132 MG/DL — HIGH (ref 70–99)
MAGNESIUM SERPL-MCNC: 2 MG/DL — SIGNIFICANT CHANGE UP (ref 1.6–2.6)
PHOSPHATE SERPL-MCNC: 4 MG/DL — SIGNIFICANT CHANGE UP (ref 2.5–4.5)
POTASSIUM SERPL-MCNC: 4.7 MMOL/L — SIGNIFICANT CHANGE UP (ref 3.5–5.3)
POTASSIUM SERPL-SCNC: 4.7 MMOL/L — SIGNIFICANT CHANGE UP (ref 3.5–5.3)
SODIUM SERPL-SCNC: 132 MMOL/L — LOW (ref 135–145)

## 2025-03-10 PROCEDURE — 93970 EXTREMITY STUDY: CPT | Mod: 26

## 2025-03-10 PROCEDURE — 93306 TTE W/DOPPLER COMPLETE: CPT | Mod: 26

## 2025-03-10 PROCEDURE — 99233 SBSQ HOSP IP/OBS HIGH 50: CPT

## 2025-03-10 RX ORDER — METOPROLOL SUCCINATE 50 MG/1
12.5 TABLET, EXTENDED RELEASE ORAL ONCE
Refills: 0 | Status: COMPLETED | OUTPATIENT
Start: 2025-03-10 | End: 2025-03-10

## 2025-03-10 RX ORDER — METOPROLOL SUCCINATE 50 MG/1
25 TABLET, EXTENDED RELEASE ORAL DAILY
Refills: 0 | Status: DISCONTINUED | OUTPATIENT
Start: 2025-03-11 | End: 2025-03-17

## 2025-03-10 RX ORDER — INSULIN GLARGINE-YFGN 100 [IU]/ML
8 INJECTION, SOLUTION SUBCUTANEOUS AT BEDTIME
Refills: 0 | Status: DISCONTINUED | OUTPATIENT
Start: 2025-03-10 | End: 2025-03-11

## 2025-03-10 RX ORDER — INSULIN LISPRO 100 U/ML
5 INJECTION, SOLUTION INTRAVENOUS; SUBCUTANEOUS
Refills: 0 | Status: DISCONTINUED | OUTPATIENT
Start: 2025-03-10 | End: 2025-03-11

## 2025-03-10 RX ADMIN — Medication 5 MILLIGRAM(S): at 06:02

## 2025-03-10 RX ADMIN — Medication 1 MILLIGRAM(S): at 20:47

## 2025-03-10 RX ADMIN — INSULIN LISPRO 2: 100 INJECTION, SOLUTION INTRAVENOUS; SUBCUTANEOUS at 17:30

## 2025-03-10 RX ADMIN — Medication 650 MILLIGRAM(S): at 17:18

## 2025-03-10 RX ADMIN — INSULIN LISPRO 1: 100 INJECTION, SOLUTION INTRAVENOUS; SUBCUTANEOUS at 08:57

## 2025-03-10 RX ADMIN — HEPARIN SODIUM 5000 UNIT(S): 1000 INJECTION INTRAVENOUS; SUBCUTANEOUS at 14:31

## 2025-03-10 RX ADMIN — INSULIN LISPRO 3 UNIT(S): 100 INJECTION, SOLUTION INTRAVENOUS; SUBCUTANEOUS at 08:57

## 2025-03-10 RX ADMIN — Medication 1 MILLIGRAM(S): at 02:18

## 2025-03-10 RX ADMIN — METOPROLOL SUCCINATE 12.5 MILLIGRAM(S): 50 TABLET, EXTENDED RELEASE ORAL at 06:04

## 2025-03-10 RX ADMIN — Medication 1 MILLIGRAM(S): at 14:31

## 2025-03-10 RX ADMIN — ATORVASTATIN CALCIUM 40 MILLIGRAM(S): 80 TABLET, FILM COATED ORAL at 22:15

## 2025-03-10 RX ADMIN — Medication 1 MILLIGRAM(S): at 03:18

## 2025-03-10 RX ADMIN — MIDODRINE HYDROCHLORIDE 10 MILLIGRAM(S): 5 TABLET ORAL at 17:18

## 2025-03-10 RX ADMIN — INSULIN LISPRO 3: 100 INJECTION, SOLUTION INTRAVENOUS; SUBCUTANEOUS at 11:46

## 2025-03-10 RX ADMIN — Medication 3 MILLIGRAM(S): at 22:18

## 2025-03-10 RX ADMIN — Medication 1 APPLICATION(S): at 06:17

## 2025-03-10 RX ADMIN — Medication 81 MILLIGRAM(S): at 11:43

## 2025-03-10 RX ADMIN — INSULIN LISPRO 5 UNIT(S): 100 INJECTION, SOLUTION INTRAVENOUS; SUBCUTANEOUS at 17:30

## 2025-03-10 RX ADMIN — CLOPIDOGREL BISULFATE 75 MILLIGRAM(S): 75 TABLET, FILM COATED ORAL at 11:43

## 2025-03-10 RX ADMIN — Medication 40 MILLIGRAM(S): at 11:44

## 2025-03-10 RX ADMIN — Medication 1 MILLIGRAM(S): at 21:30

## 2025-03-10 RX ADMIN — HEPARIN SODIUM 5000 UNIT(S): 1000 INJECTION INTRAVENOUS; SUBCUTANEOUS at 06:02

## 2025-03-10 RX ADMIN — INSULIN LISPRO 3 UNIT(S): 100 INJECTION, SOLUTION INTRAVENOUS; SUBCUTANEOUS at 11:47

## 2025-03-10 RX ADMIN — Medication 650 MILLIGRAM(S): at 06:01

## 2025-03-10 RX ADMIN — Medication 1 TABLET(S): at 11:43

## 2025-03-10 RX ADMIN — Medication 650 MILLIGRAM(S): at 07:02

## 2025-03-10 NOTE — PROGRESS NOTE ADULT - SUBJECTIVE AND OBJECTIVE BOX
ADVANCED HEART FAILURE & TRANSPLANT- PROGRESS NOTE  *To reach the NS1 Team from 8am to 5pm, please call [phone].  ___________________________________________________________________________    Subjective:  - feels ok    Medications:  acetaminophen     Tablet .. 650 milliGRAM(s) Oral every 6 hours PRN  aspirin enteric coated 81 milliGRAM(s) Oral daily  atorvastatin 40 milliGRAM(s) Oral at bedtime  benzocaine/menthol Lozenge 1 Lozenge Oral once  bisacodyl 5 milliGRAM(s) Oral every 12 hours  chlorhexidine 4% Liquid 1 Application(s) Topical <User Schedule>  clopidogrel Tablet 75 milliGRAM(s) Oral daily  clopidogrel Tablet      dextrose 5%. 1000 milliLiter(s) IV Continuous <Continuous>  dextrose 5%. 1000 milliLiter(s) IV Continuous <Continuous>  dextrose 50% Injectable 25 Gram(s) IV Push once  dextrose 50% Injectable 12.5 Gram(s) IV Push once  dextrose 50% Injectable 25 Gram(s) IV Push once  dextrose Oral Gel 15 Gram(s) Oral once PRN  glucagon  Injectable 1 milliGRAM(s) IntraMuscular once  heparin   Injectable 5000 Unit(s) SubCutaneous every 8 hours  HYDROmorphone  Injectable 1 milliGRAM(s) IV Push every 6 hours PRN  insulin glargine Injectable (LANTUS) 12 Unit(s) SubCutaneous at bedtime  insulin lispro (ADMELOG) corrective regimen sliding scale   SubCutaneous three times a day before meals  insulin lispro (ADMELOG) corrective regimen sliding scale   SubCutaneous at bedtime  insulin lispro Injectable (ADMELOG) 3 Unit(s) SubCutaneous three times a day with meals  melatonin 3 milliGRAM(s) Oral at bedtime PRN  metoprolol tartrate 12.5 milliGRAM(s) Oral every 8 hours  midodrine. 10 milliGRAM(s) Oral once  Nephro-rober 1 Tablet(s) Oral daily  ondansetron Injectable 4 milliGRAM(s) IV Push every 6 hours PRN  pantoprazole  Injectable 40 milliGRAM(s) IV Push daily  polyethylene glycol 3350 17 Gram(s) Oral daily  senna 2 Tablet(s) Oral at bedtime  sodium chloride 0.65% Nasal 1 Spray(s) Both Nostrils three times a day PRN  sodium chloride 0.9% lock flush 10 milliLiter(s) IV Push every 1 hour PRN    Vitals:  Vital Signs Last 24 Hours  T(C): 37.1 (03-10-25 @ 04:37), Max: 37.1 (03-10-25 @ 04:37)  HR: 100 (03-10-25 @ 04:37) (90 - 108)  BP: 101/64 (03-10-25 @ 04:37) (92/55 - 105/64)  RR: 18 (03-10-25 @ 04:37) (17 - 18)  SpO2: 93% (03-10-25 @ 04:37) (93% - 98%)    Weight in k.1 (03-10 @ 05:39)    I&O's Summary    09 Mar 2025 07:01  -  10 Mar 2025 07:00  --------------------------------------------------------  IN: 240 mL / OUT: 600 mL / NET: -360 mL    10 Mar 2025 07:01  -  10 Mar 2025 13:04  --------------------------------------------------------  IN: 120 mL / OUT: 0 mL / NET: 120 mL        Physical Exam  General: No distress. Comfortable.  Chest: Clear to auscultation bilaterally  CV: Normal S1 and S2.   Abdomen: Soft, non-distended, non-tender  Neurology: Alert and oriented times three.     Labs:                        10.0   8.88  )-----------( 166      ( 09 Mar 2025 06:01 )             28.7     03-10    132[L]  |  95[L]  |  43[H]  ----------------------------<  132[H]  4.7   |  20[L]  |  4.40[H]    Ca    8.9      10 Mar 2025 05:55  Phos  4.0     03-10  Mg     2.0     03-10    TPro  7.6  /  Alb  3.0[L]  /  TBili  0.8  /  DBili  0.3  /  AST  19  /  ALT  16  /  AlkPhos  121[H]                  Lactate, Blood: 1.4 mmol/L ( @ 06:01)

## 2025-03-10 NOTE — PROGRESS NOTE ADULT - ASSESSMENT
64y Female with history of CHF presents as a transfer for LE CO2 angiogram. Nephrology consulted for elevated Scr.    1) JAMEL: likely due to ATN and CRS for which patient initiated on RRT on 2/20. JAMEL exacerbated by contrast nephropathy with last HD on 3/7 tolerated well with 1.5L removed with an additional UF on 3/8 given volume overload on CT chest. Plan for additional HD today. Plan for TDC in AM given lack of significant renal recovery. S/P straight cath for urinary retention. Monitor serial bladder scans. Would plan for outpatient HD center placement under Dr. Parks at either [facility], [facility] or [facility]. Avoid nephrotoxins.    2) HTN: BP low normal. Monitor off anti-hypertensive medications. On midodrine pre-HD to avoid intradialytic hypotension.    3) LE edema: Off diuretics. UF with HD. TTE with severely decreased LVSF. Monitor UO.     4) Anemia: Hb improving with low TSAT. No IV iron given elevated ferritin. S/P Epo 8K X 1 dose 3/1. Monitor Hb.    5) Hyponatremia: Secondary to JAMEL and polydipsia. High dialysate Na bath. Continue with 1L .      [facility] NEPHROLOGY  Raji Waterman M.D.  Mars Feliz D.O.  Kelley Parks M.D.  MD Nancy Kulkarni, MSN, ANP-C    Telephone: [phone]  Facsimile: [phone]    [address] [place] [address], [address]  [place] [address]

## 2025-03-10 NOTE — PROGRESS NOTE ADULT - PROBLEM SELECTOR PLAN 2
-TTE with severely decreased LV systolic function w/ LVEF 20%, regional WMA and multiple segmental abnormalities, reduced RV systolic function. LV function worsening since prior echo  -S/p cath with multivessel disease  -S/p milrinone drip   -S/p Bumex gtt   -Management per HF team.

## 2025-03-10 NOTE — PROGRESS NOTE ADULT - PROBLEM SELECTOR PLAN 4
-S/p LHC with multivessel disease  -S/p LM/LAD PCI, VERA x 1 to LAD, VERA to LM, POBA to CX on 3/4  -Cardiology f/u.

## 2025-03-10 NOTE — PROGRESS NOTE ADULT - PROBLEM SELECTOR PLAN 1
-Primarily with exertion & when laying flat. Pt denies SOB at rest.  -Suspect 2nd to fluid overload, underlying CHF  -Keep O>I as tolerated  -VBG 2/14 acceptable  -ABX per primary team for LE wounds  -Keep sats >90% with O2 PRN (currently RA).  -SOB and cough improved  -Incentive spirometry use encouraged.  -CTA chest with b/l pl effusions R>L, congestion, negative for PE.

## 2025-03-10 NOTE — PROVIDER CONTACT NOTE (MEDICATION) - SITUATION
bedtime fs 87, repeated 88. apple juice given fs recheck 102. pt is due for bedtime Lantus dose. pt also will be NPO after midnight

## 2025-03-10 NOTE — PROGRESS NOTE ADULT - SUBJECTIVE AND OBJECTIVE BOX
Follow-up Pulm Progress Note    No new respiratory events overnight.  Denies SOB/CP.     Medications:  MEDICATIONS  (STANDING):  aspirin enteric coated 81 milliGRAM(s) Oral daily  atorvastatin 40 milliGRAM(s) Oral at bedtime  benzocaine/menthol Lozenge 1 Lozenge Oral once  bisacodyl 5 milliGRAM(s) Oral every 12 hours  chlorhexidine 4% Liquid 1 Application(s) Topical <User Schedule>  clopidogrel Tablet 75 milliGRAM(s) Oral daily  clopidogrel Tablet      dextrose 5%. 1000 milliLiter(s) (100 mL/Hr) IV Continuous <Continuous>  dextrose 5%. 1000 milliLiter(s) (50 mL/Hr) IV Continuous <Continuous>  dextrose 50% Injectable 25 Gram(s) IV Push once  dextrose 50% Injectable 12.5 Gram(s) IV Push once  dextrose 50% Injectable 25 Gram(s) IV Push once  glucagon  Injectable 1 milliGRAM(s) IntraMuscular once  heparin   Injectable 5000 Unit(s) SubCutaneous every 8 hours  insulin glargine Injectable (LANTUS) 12 Unit(s) SubCutaneous at bedtime  insulin lispro (ADMELOG) corrective regimen sliding scale   SubCutaneous three times a day before meals  insulin lispro (ADMELOG) corrective regimen sliding scale   SubCutaneous at bedtime  insulin lispro Injectable (ADMELOG) 3 Unit(s) SubCutaneous three times a day with meals  metoprolol tartrate 12.5 milliGRAM(s) Oral every 8 hours  midodrine. 10 milliGRAM(s) Oral once  Nephro-rober 1 Tablet(s) Oral daily  pantoprazole  Injectable 40 milliGRAM(s) IV Push daily  polyethylene glycol 3350 17 Gram(s) Oral daily  senna 2 Tablet(s) Oral at bedtime    MEDICATIONS  (PRN):  acetaminophen     Tablet .. 650 milliGRAM(s) Oral every 6 hours PRN Mild Pain (1 - 3)  dextrose Oral Gel 15 Gram(s) Oral once PRN Blood Glucose LESS THAN 70 milliGRAM(s)/deciliter  HYDROmorphone  Injectable 1 milliGRAM(s) IV Push every 6 hours PRN Severe Pain (7 - 10)  melatonin 3 milliGRAM(s) Oral at bedtime PRN Insomnia  ondansetron Injectable 4 milliGRAM(s) IV Push every 6 hours PRN Nausea and/or Vomiting  sodium chloride 0.65% Nasal 1 Spray(s) Both Nostrils three times a day PRN Dryness  sodium chloride 0.9% lock flush 10 milliLiter(s) IV Push every 1 hour PRN Pre/post blood products, medications, blood draw, and to maintain line patency          Vital Signs Last 24 Hrs  T(C): 37.1 (10 Mar 2025 04:37), Max: 37.1 (10 Mar 2025 04:37)  T(F): 98.7 (10 Mar 2025 04:37), Max: 98.7 (10 Mar 2025 04:37)  HR: 100 (10 Mar 2025 04:37) (90 - 108)  BP: 101/64 (10 Mar 2025 04:37) (92/55 - 105/64)  BP(mean): --  RR: 18 (10 Mar 2025 04:37) (17 - 18)  SpO2: 93% (10 Mar 2025 04:37) (93% - 98%)    Parameters below as of 10 Mar 2025 04:37  Patient On (Oxygen Delivery Method): room air          VBG pH 7.34 03-10 @ 05:54    VBG pCO2 45 03-10 @ 05:54    VBG O2 sat 74.1 03-10 @ 05:54    VBG lactate 1.6 03-10 @ 05:54      03-09 @ 07:01  -  03-10 @ 07:00  --------------------------------------------------------  IN: 240 mL / OUT: 600 mL / NET: -360 mL          LABS:                        10.0   8.88  )-----------( 166      ( 09 Mar 2025 06:01 )             28.7     03-10    132[L]  |  95[L]  |  43[H]  ----------------------------<  132[H]  4.7   |  20[L]  |  4.40[H]    Ca    8.9      10 Mar 2025 05:55  Phos  4.0     03-10  Mg     2.0     03-10    TPro  7.6  /  Alb  3.0[L]  /  TBili  0.8  /  DBili  0.3  /  AST  19  /  ALT  16  /  AlkPhos  121[H]  03-09          CAPILLARY BLOOD GLUCOSE      POCT Blood Glucose.: 198 mg/dL (10 Mar 2025 08:05)      Urinalysis Basic - ( 10 Mar 2025 05:55 )    Color: x / Appearance: x / SG: x / pH: x  Gluc: 132 mg/dL / Ketone: x  / Bili: x / Urobili: x   Blood: x / Protein: x / Nitrite: x   Leuk Esterase: x / RBC: x / WBC x   Sq Epi: x / Non Sq Epi: x / Bacteria: x            Physical Examination:  PULM: Decreased BS at bases   CVS: S1, S2 heard    RADIOLOGY REVIEWED  < from: CT Angio Chest PE Protocol w/ IV Cont (03.07.25 @ 11:32) >    ACC: [id number] EXAM:  CT ANGIO CHEST PULM ART WAWIC   ORDERED BY: JORGE ALBERTO NEWMAN     PROCEDURE DATE:  03/07/2025          INTERPRETATION:  CLINICAL INFORMATION: Shortness of breath, evaluate for   pulmonary embolism.    COMPARISON: CT chest 1/25/2025    CONTRAST/COMPLICATIONS:  IV Contrast: Omnipaque 350  70 cc administered   30 cc discarded  Oral Contrast: NONE  .    PROCEDURE:  CT Angiography of the Chest.  Sagittal and coronal reformats were performed as well as 3D (MIP)   reconstructions.    FINDINGS:    LUNGS AND LARGE AIRWAYS: Minimal secretions within the left main   bronchus. Peripheral predominant groundglass opacities in all lobes.   Partial atelectasis of the bilateral lower lobes.  PLEURA: Small left and moderate right pleuraleffusions.  VESSELS: No pulmonary embolus is noted. Aortic calcifications. Coronary   artery calcifications. Right IJ central venous catheter with tip   terminating in the distal SVC.  HEART: Cardiomegaly. Trace pericardial effusion.  MEDIASTINUM ANDHILA: No lymphadenopathy.  CHEST WALL AND LOWER NECK: Within normal limits.  VISUALIZED UPPER ABDOMEN: Vascular calcifications.  BONES: Within normal limits.    IMPRESSION:  *  No pulmonary embolism.  *  Moderate right and small left pleural effusions.  *  Nonspecific peripheral groundglass opacities in the bilateral lungs,   slightly more prominent when compared to prior examination on 1/25/2025.        --- End of Report ---      < end of copied text >       No

## 2025-03-10 NOTE — PROGRESS NOTE ADULT - ASSESSMENT
64F HFrEF (previously 30-35%, now 20%), DM, diabetic foot ulcer, severe PAD b/l, presenting initially for vascular angiogram, found to be in ADHF with volume. Underwent RHC : RA 20, PA 49/29 (40), PCWP 35, mVO2 51.1, CO/CI 3.8/2.2, SVR 1095. Now s/p PCI/stent of LM and LAD and POBA of Cx. Primacor was weaned off 3/9 and now tolerating low doses of GDMT. She remains dependent on HD and currently not making any urine. Will sign off at this time, please call back if any questions.             Cardiac testin25 RHC: RA 20, PA 49/29 (40), PCWP 35, mVO2 51.1, CO/CI 3.8/2.2, SVR 1095  25 LHC: RCA , severe ostial LM disease, diffuse disease in LAD and LCx, some L to R collaterals  cMRI 25 : LVEF is 25%, greater than 75% subendocardial scarring involving the basal to mid inferior wall of the left ventricle, left ventricular transmural scarring involving the apical septal wall and likely near transmural scarring of the apical lateral wall. 50% scarring of the left ventricular basal inferoseptal wall.  TTE 2/10/25: EF 20%, reduced RVSF  TTE 25: EF 30-35%, grade 2 DD  NST 25: small moderate defect in apical wall that is predominantly fixed    Home cardiac meds: toprol 25

## 2025-03-10 NOTE — PROGRESS NOTE ADULT - PROBLEM SELECTOR PLAN 1
-Primacor weaned off 3/9  - would transition Lopressor to Toprol XL 25 mg PO QD  - start Hydralazine 10 mg q8 on non-HD days   - remains on HD, nephrology following as stated bleow   -not a candidate for advanced therapies given advanced CKD and PAD

## 2025-03-10 NOTE — PROGRESS NOTE ADULT - ASSESSMENT
63 YO F with PMHx of HFrEF 30-35 and grade 2 ddfxn (last TTE on 01/16/25), DM2, and diabetic foot ulcer. Recent admission at [facility] for ADHF. Patient now represents to OSH and ultimately to [facility] as a transfer for worsening right leg pain and fluid secretion. As per documentation, patient was reported to have severe depletion of RLE blood flow beyond the popliteal vessel at knee and similar findings in the left. Patient was transferred to [facility] for CO2 angiogram with Dr. Baltazar. Of note, patient also with reported visual disturbance for which patient was seen and evaluated by opthalmology. Internal Medicine has been consulted on Ms. Kirby's care for medical management.     # ADHF/ BiV HFrEF 20   - Recent admission at [facility] for ADHF and on dobutamine outpatient  - TTE 1/16 with EF 30-35 with moderately reduced LVSF and grade 2 ddfxn, normal RVSF , trace TR/ AR, and trace pericardial effusion  - RPT TTE with EF 20, severely decreased LV, decreased RVSF with TAPSE 1.2, and regional wall motion abnormalities present with entire septum, entire apex, entire inferior wall, mid inferolateral segment, and mid anterolateral segment are hypokinetic.   - RHC with RA 20, PA 49/29 (40), PCWP 35, mVO2 51.1, CO/CI 3.8/2.2, SVR 1095 w/ Multivessel CAD   - s/p zaroxyln 10 QD to augment diuresis   - s/p bumex GTT   - s/p HTS to augment diuresis   - RPT limited TTE w/ LVSF severely decreased, reduced RVSF, LVOT VTI 12, mild to mod TR, and mildly elevated right atrial pressure  - Case discussed with EP and needs to be on GDMT for 3 months before AICD placement and should be stabilized off of inotropic support.   - Continue on bumex 4 IV QD but anuric and will DC  - S/P Milrinone gtt as per Cardio   - Pending RPT ECHO post cath  - HF and cards following and adjusting medications --> Started on Toprol XL 25 PO Qd and Hydralazine 10 Q8 on non HD Days per HF   - Monitor I and O, diuresis per Cardio/ Renal    - Monitor volume status   - Check daily weights    - Monitor on telemetry; Monitor electrolytes   - Cardio, HF, Renal, and EP evals appreciated; F/u recs     # Tachycardia and Hypoxia  - Tachycardiac and on RA with SPO2 running 90-92   - Could be second to low cardiac output   - CTA with no PE  - On Toprol XL 25 PO Qd. Monitor HR   - Monitor closely on Tele  - Cardio eval appreciated; F/u recs    # JAMEL with Hyponatremia and Metabolic Acidosis   - Baseline CRE 0.7-1.2 and increased to ~4  - As per OSH documentation, JAMEL was thought to be second to Unasyn/ Bumex / Entresto use and Hypotension   - US RENAL with no hydronephrosis  - Likely cardiorenal induced with low flow state in ADHF, however now rising again and concern for milrinone vs overdiuresis (prerenal) vs cardiorenal.   - Milrinone adjusted and diuresis turned off, however no improvement/ worsened in renal function noted.   - s/p shiley placement and started on HD 2/20  - Last HD on 3/3, attempted to monitor off, however BUN/ CRE rapidly increasing.   - Per discussion with renal will likely need long term HD with long term access - IR consulted for shiley to permacath conversion planned for 3/11 in IR   - HF and renal following   - Avoid nephrotoxic agents  - Monitor Cr and daily BMP     # CAD with TVD   - NST abnormal with small-sized, moderate defect in apical wall that is predominantly fixed suggestive of an infarction with minimal marcus-infarct ischemia. Post stress LVEF 25.  - s/p LHC with RCA , severe ostial LM disease, diffuse disease in LAD and LCx, some L to R collaterals (Multivessel CAD)  - Case discussed with CTSx and NOT a candidate for CABG due to comorbidities  - Cardiac MRI with greater than 75% subendocardial scarring of the basal to mid-inferior wall of the LV and 50% scarring of the left ventricular basal inferoseptal wall. There is also left ventricular transmural scarring involving the apical septal wall and likely near transmural scarring of the apical lateral wall.  - s/p RPT LHC 3/4 with LM 80 s/p POBA/ VERA with LM 1, pLAD 90 s/p POBA/ VERA with pLAD 1, and Cx 80 s/p POBA with Cx 10.   - Continue on DAPT and Statin   - Pending RPT ECHO  - IC, Cards and HF following     # BL LE Edema likely in setting of ADHF  - Remove volume as per Cardio, HF and Renal   - BL LE elevation and compression  - LE Duplex neg   - Monitor for now    # Pericardial effusion likely in setting ADHF  - TTE with trace pericardial effusion   - CT Chest with small pericardial effusion   - Denies chest pain, palpitations, chest tightness or discomfort, shortness of breath or dyspnea   - Repeat TTE in 1 month for further evaluation   - Diuresis per cardio/ renal.  - Monitor on telemetry  - Cardio following    # Pleural effusion likely in setting ADHF  - CT Chest with small BL pleural effusions  - Monitor O2 saturation  - Supplement to maintain > 90%   - Diuresis / HD per cardio/ renal.     # Hypernatremia to hyponatremia  - Hypernatremia likely from HTS vs over diuresis/ intravascular dehydration. HTS and diuresis stopped with improved sodium   - Hyponatremia now noted second to volume overload   - Avoid overcorrection > 6-8 mEq in 24 hours  - Monitor sodium with HD    # Transaminitis  - Likely 2/2 hepatic congestion   - Volume removal with HD as tolerated  - Trend LFTs, if uptrend post-HD initiation, check ABD US  - Serial ABD Examinations      # Leukocytosis likely in setting of BL LE ulcers with pop occlusion   - BCx negative   - UCx with probable contamination   - S/P unasyn for ABX.   - Leukocytosis fluctuating, however appears nontoxic with no fever spikes and monitoring closely off ABX  - If febrile check pan cultures   - Trend CBC, temp curve, VS and adjust as tolerated    # Foot ulcers with worsening RLE pain second to pop artery occlusion +/- diabetic ulcers   - RLE Arterial Duplex with popliteal artery occlusion   - LLE Arterial Duplex with underlying disease cannot be excluded   - s/p angio with pop and AT VERA on 2/24   - Continue on Lipitor and DAPT  - Continue pain control with oxy  - Wound care called for dressing recs   - Vascular following,     # Visual Disturbance  - CT Head w/ old infarcts  - On ASA and Statin   - Opthalmology eval appreciated    # Indigestion and Constipation   - PPI, miralax and senna continued  - Monitor BMs    # Anemia likely mixed AOCD vs iron deficiency   - HH stable in 9s on HSQ  - Anemia panel with AOCD   - Started on venofer, but ferritin high and now stopped   - Continue on EPO with HD per renal  - Monitor HH   - Transfuse for Hgb < 8  - Maintain active T/S    # Diabetes Mellitus A1C 11.6   - Continue on lantus 13 with lispro 5 and ISS   - Diabetic DASH diet   - Monitor and adjust glucose levels PRN   - Endocrine following; F/u recs     # HLD and HLD  - Continue on lipitor and toprol  - Monitor BP    # DISPO TBD  - Palliative care called and patient remains FULL CODE       Discussed with Attending and ACP  63 YO F with PMHx of HFrEF 30-35 and grade 2 ddfxn (last TTE on 01/16/25), DM2, and diabetic foot ulcer. Recent admission at [facility] for ADHF. Patient now represents to OSH and ultimately to [facility] as a transfer for worsening right leg pain and fluid secretion. As per documentation, patient was reported to have severe depletion of RLE blood flow beyond the popliteal vessel at knee and similar findings in the left. Patient was transferred to [facility] for CO2 angiogram with Dr. Baltazar. Of note, patient also with reported visual disturbance for which patient was seen and evaluated by opthalmology. Internal Medicine has been consulted on Ms. Kirby's care for medical management.     # ADHF/ BiV HFrEF 20   - Recent admission at [facility] for ADHF and on dobutamine outpatient  - TTE 1/16 with EF 30-35 with moderately reduced LVSF and grade 2 ddfxn, normal RVSF , trace TR/ OK, and trace pericardial effusion  - RPT TTE with EF 20, severely decreased LV, decreased RVSF with TAPSE 1.2, and regional wall motion abnormalities present with entire septum, entire apex, entire inferior wall, mid inferolateral segment, and mid anterolateral segment are hypokinetic.   - RHC with RA 20, PA 49/29 (40), PCWP 35, mVO2 51.1, CO/CI 3.8/2.2, SVR 1095 w/ Multivessel CAD   - s/p zaroxyln 10 QD to augment diuresis   - s/p bumex GTT   - s/p HTS to augment diuresis   - RPT limited TTE w/ LVSF severely decreased, reduced RVSF, LVOT VTI 12, mild to mod TR, and mildly elevated right atrial pressure  - Case discussed with EP and needs to be on GDMT for 3 months before AICD placement and should be stabilized off of inotropic support.   - Continue on bumex 4 IV QD but anuric and will DC  - S/P Milrinone gtt as per Cardio   - Pending RPT ECHO post cath  - HF and cards following and adjusting medications --> Started on Toprol XL 25 PO Qd and Hydralazine 10 Q8 on non HD Days per HF   - Monitor I and O, diuresis per Cardio/ Renal    - Monitor volume status   - Check daily weights    - Monitor on telemetry; Monitor electrolytes   - Cardio, HF, Renal, and EP evals appreciated; F/u recs     # Tachycardia and Hypoxia  - Tachycardiac and on RA with SPO2 running 90-92   - Could be second to low cardiac output   - CTA with no PE  - Duplex and TTE pending   - On Toprol XL 25 PO Qd. Monitor HR   - Monitor closely on Tele  - Cardio eval appreciated; F/u recs    # JAMEL with Hyponatremia and Metabolic Acidosis   - Baseline CRE 0.7-1.2 and increased to ~4  - As per OSH documentation, JAMEL was thought to be second to Unasyn/ Bumex / Entresto use and Hypotension   - US RENAL with no hydronephrosis  - Likely cardiorenal induced with low flow state in ADHF, however now rising again and concern for milrinone vs overdiuresis (prerenal) vs cardiorenal.   - Milrinone adjusted and diuresis turned off, however no improvement/ worsened in renal function noted.   - s/p shiley placement and started on HD 2/20  - Last HD on 3/3, attempted to monitor off, however BUN/ CRE rapidly increasing.   - Per discussion with renal will likely need long term HD with long term access - IR consulted for shiley to permacath conversion planned for 3/11 in IR   - HF and renal following   - Avoid nephrotoxic agents  - Monitor Cr and daily BMP     # CAD with TVD   - NST abnormal with small-sized, moderate defect in apical wall that is predominantly fixed suggestive of an infarction with minimal marcus-infarct ischemia. Post stress LVEF 25.  - s/p LHC with RCA , severe ostial LM disease, diffuse disease in LAD and LCx, some L to R collaterals (Multivessel CAD)  - Case discussed with CTSx and NOT a candidate for CABG due to comorbidities  - Cardiac MRI with greater than 75% subendocardial scarring of the basal to mid-inferior wall of the LV and 50% scarring of the left ventricular basal inferoseptal wall. There is also left ventricular transmural scarring involving the apical septal wall and likely near transmural scarring of the apical lateral wall.  - s/p RPT C 3/4 with LM 80 s/p POBA/ VERA with LM 1, pLAD 90 s/p POBA/ VERA with pLAD 1, and Cx 80 s/p POBA with Cx 10.   - Continue on DAPT and Statin   - Pending RPT ECHO  - IC, Cards and HF following     # BL LE Edema likely in setting of ADHF  - Remove volume as per Cardio, HF and Renal   - BL LE elevation and compression  - LE Duplex neg   - Monitor for now    # Pericardial effusion likely in setting ADHF  - TTE with trace pericardial effusion   - CT Chest with small pericardial effusion   - Denies chest pain, palpitations, chest tightness or discomfort, shortness of breath or dyspnea   - Repeat TTE in 1 month for further evaluation   - Diuresis per cardio/ renal.  - Monitor on telemetry  - Cardio following    # Pleural effusion likely in setting ADHF  - CT Chest with small BL pleural effusions  - Monitor O2 saturation  - Supplement to maintain > 90%   - Diuresis / HD per cardio/ renal.     # Hypernatremia to hyponatremia  - Hypernatremia likely from HTS vs over diuresis/ intravascular dehydration. HTS and diuresis stopped with improved sodium   - Hyponatremia now noted second to volume overload   - Avoid overcorrection > 6-8 mEq in 24 hours  - Monitor sodium with HD    # Transaminitis  - Likely 2/2 hepatic congestion   - Volume removal with HD as tolerated  - Trend LFTs, if uptrend post-HD initiation, check ABD US  - Serial ABD Examinations      # Leukocytosis likely in setting of BL LE ulcers with pop occlusion   - BCx negative   - UCx with probable contamination   - S/P unasyn for ABX.   - Leukocytosis fluctuating, however appears nontoxic with no fever spikes and monitoring closely off ABX  - If febrile check pan cultures   - Trend CBC, temp curve, VS and adjust as tolerated    # Foot ulcers with worsening RLE pain second to pop artery occlusion +/- diabetic ulcers   - RLE Arterial Duplex with popliteal artery occlusion   - LLE Arterial Duplex with underlying disease cannot be excluded   - s/p angio with pop and AT VERA on 2/24   - Continue on Lipitor and DAPT  - Continue pain control with oxy  - Wound care called for dressing recs   - Vascular following,     # Visual Disturbance  - CT Head w/ old infarcts  - On ASA and Statin   - Opthalmology eval appreciated    # Indigestion and Constipation   - PPI, miralax and senna continued  - Monitor BMs    # Anemia likely mixed AOCD vs iron deficiency   - HH stable in 9s on HSQ  - Anemia panel with AOCD   - Started on venofer, but ferritin high and now stopped   - Continue on EPO with HD per renal  - Monitor HH   - Transfuse for Hgb < 8  - Maintain active T/S    # Diabetes Mellitus A1C 11.6   - Continue on lantus 13 with lispro 5 and ISS   - Diabetic DASH diet   - Monitor and adjust glucose levels PRN   - Endocrine following; F/u recs     # HLD and HLD  - Continue on lipitor and toprol  - Monitor BP    # DISPO TBD  - Palliative care called and patient remains FULL CODE       Discussed with Attending and ACP

## 2025-03-10 NOTE — CHART NOTE - NSCHARTNOTEFT_GEN_A_CORE
POCT Blood Glucose:  264 mg/dL (03-10-25 @ 11:42)  198 mg/dL (03-10-25 @ 08:05)  134 mg/dL (03-09-25 @ 21:29)  87 mg/dL (03-09-25 @ 17:04)      eMAR:atorvastatin   40 milliGRAM(s) Oral (03-09-25 @ 21:40)    insulin glargine Injectable (LANTUS)   12 Unit(s) SubCutaneous (03-09-25 @ 21:40)    insulin lispro (ADMELOG) corrective regimen sliding scale   3 Unit(s) SubCutaneous (03-10-25 @ 11:46)   1 Unit(s) SubCutaneous (03-10-25 @ 08:57)    insulin lispro Injectable (ADMELOG)   3 Unit(s) SubCutaneous (03-10-25 @ 11:47)   3 Unit(s) SubCutaneous (03-10-25 @ 08:57)   3 Unit(s) SubCutaneous (03-09-25 @ 17:35)    Diet, Regular:   Consistent Carbohydrate {No Snacks} (CSTCHO)  1000mL Fluid Restriction ([id number])  Low Sodium  No Concentrated Phosphorus  Halal  Lacto Veg (Accepts Milk & Milk Products)  Supplement Feeding Modality:  Oral  Nepro Cans or Servings Per Day:  1       Frequency:  Daily (03-06-25 @ 15:14) [Active]      BGs and insulin regimen reviewed   Noted plan for permacath placement tomorrow, will be NPO after MN   Last 24 hour BGs  with serum fasting  and prelunch  today. noted tightly controlled BG 87 at predinner.   BGs above goal today. Will adjust insulin doses     - Decrease Lantus to 8 units for NPO state ( NPO after MN today )   - Increase meal coverage Admelog 5-3-5 units with meals ( Hold if NPO)       Contact via Microsoft Teams during business hours  To reach covering provider access AMION via sunrise tools  For Urgent matters/after-hours/weekends/holidays please page endocrine fellow on call   For nonurgent matters please email [email or web address]    Please note that this patient may be followed by different provider tomorrow.  Notify endocrine 24 hours prior to discharge for final recommendations POCT Blood Glucose:  264 mg/dL (03-10-25 @ 11:42)  198 mg/dL (03-10-25 @ 08:05)  134 mg/dL (03-09-25 @ 21:29)  87 mg/dL (03-09-25 @ 17:04)      eMAR:atorvastatin   40 milliGRAM(s) Oral (03-09-25 @ 21:40)    insulin glargine Injectable (LANTUS)   12 Unit(s) SubCutaneous (03-09-25 @ 21:40)    insulin lispro (ADMELOG) corrective regimen sliding scale   3 Unit(s) SubCutaneous (03-10-25 @ 11:46)   1 Unit(s) SubCutaneous (03-10-25 @ 08:57)    insulin lispro Injectable (ADMELOG)   3 Unit(s) SubCutaneous (03-10-25 @ 11:47)   3 Unit(s) SubCutaneous (03-10-25 @ 08:57)   3 Unit(s) SubCutaneous (03-09-25 @ 17:35)    Diet, Regular:   Consistent Carbohydrate {No Snacks} (CSTCHO)  1000mL Fluid Restriction ([id number])  Low Sodium  No Concentrated Phosphorus  Halal  Lacto Veg (Accepts Milk & Milk Products)  Supplement Feeding Modality:  Oral  Nepro Cans or Servings Per Day:  1       Frequency:  Daily (03-06-25 @ 15:14) [Active]      BGs and insulin regimen reviewed   Noted plan for permacath placement tomorrow, will be NPO after MN   Last 24 hour BGs  with serum fasting  and prelunch  today. noted tightly controlled BG 87 at predinner.   BGs above goal today. Will adjust insulin doses     - Decrease Lantus to 8 units for NPO state ( NPO after MN today )   - Increase meal coverage Admelog 5 units with meals ( Hold if NPO)       Contact via Microsoft Teams during business hours  To reach covering provider access AMION via sunrise tools  For Urgent matters/after-hours/weekends/holidays please page endocrine fellow on call   For nonurgent matters please email [email or web address]    Please note that this patient may be followed by different provider tomorrow.  Notify endocrine 24 hours prior to discharge for final recommendations

## 2025-03-10 NOTE — PROGRESS NOTE ADULT - ASSESSMENT
64F, hx of CHF (last TTE on 1/16/25 EF 30-35%, G2DD), DM, diabetic foot ulcer with a recent admission at [facility] for CHF exacerbation 1/14-1/17 p/w worsening R. leg pain and fluid secretion, was meeting sepsis criteria with podiatry having low suspicion for right cellulitis and admitted for continued management of HF exacerbation and needing ischemic eval.     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  -Left leg Arterial Duplex: Slightly tardus parvus waveform of the left popliteal artery is noted, for which underlying disease cannot be excluded. Posterior tibial artery waveform is nonpulsatile. Anterior tibial artery tardus parvus flow is noted.   Transferred to [facility] for CO2 angiogram as per vascular surgery    #  PAD Wound of lower extremity.   ·  Plan: Podiatry following, b/l serous bullae, no concerns for infection  Found to have RLE coolness  -Right Leg Arterial Duplex: Popliteal artery is occluded with negligible flow of right trifurcation arteries.  -Left leg Arterial Duplex: Slightly tardus parvus waveform of the left popliteal artery is noted, for which underlying disease cannot be excluded. Posterior tibial artery waveform is nonpulsatile. Anterior tibial artery tardus parvus flow is noted.  -Started on heparin gtt and dobutamine gtt -Will transfer to [facility] for CO2 angiogram as per vascular surgery as not candidate for CTA due to worsening SCr  -Keep compression dressing  -LE elevation above heart level at rest.  -Transferred to [facility] for CO2 angiogram   - Overall this patient is at   intermediate  risk (for cardiac death, nonfatal myocardial infarction, and nonfatal cardiac arrest perioperatively for this intermediate  risk procedure).   No cardiac contraindications for CO2 vangiogram  There  are  no further recommendation for risk stratifying imaging/stress testing prior to planned surgery  Seen by Podiatry-No acute pod intervention, local wound care only-   PAD with rest ischemia  s/p AT/Popliteal angioplasty on DAPT        # HFrEF (congestive heart failure). Ischemic Cardiomyopathy  ·  Plan: Hx of HF, previous admission in January, on home Lasix 40 qD, Metoprolol Succ 25 qD. Not on Entresto due to insurance issues  Last TTE: LVSF moderately decreased w/ EF 30-35 %. Moderate G2DD. Mild MR  Stress test: small-sized, moderate defect(s) in the apical wall that is predominantly fixed suggestive of an infarction with minimal marcus-infarct ischemia  Ischemic cardiomyopathy  GDMT on hold 2/2 JAMEL  Repeat TTE EF 20% with WMA RAP~~8  Repeat Limited TTE  Taper off Milrinone and start GDMT  Is currently off Hydral/Isdn and Bumex 2/2 soft BP  Continue HD with UF had 2900mL removed yesterday will stop Milrinone and observe  Started  low dose BBlocker Metoprolol tartate 12.5 mg PO q8  03/10-Off Midodrine will reattempt Hyd/Isdn        # CAD  [facility]-RCA , severe ostial LM disease, diffuse disease in LAD and LCx, some L to R collaterals-seen by CTS advise cMR for viability poor target vessels for CABG-?High risk LM/LAD PCI  Had cMR-LVEF is 25%, greater than 75% subendocardial scarring involving the basal to mid inferior wall of the left ventricle, left ventricular transmural scarring involving the apical septal wall and likely near transmural scarring of the apical lateral wall. 50% scarring of the left ventricular basal inferoseptal wall.  03/05-s/p PCI-LM/LAD   Continue DAPT        # JAMEL on CKD   Creatinine Trend: 3.84<--, 3.05<--, 4.46<--, 3.63<--, 2.35<--, 2.36<--3.01 <--3.39<--2.65<--2.34<--2.12<--, 1.89<- 3.02<--3.38<--(HD)-- 4.76<--4.07<---3.90<-- 1.17  Has had 3 sessions HD for interrmittent HD to allow contrast based procedures is non oliguric  Plan to continue HD likely will need extended RRT  Srini change on 03/11      PLAN FOR REHAB  [facility]

## 2025-03-10 NOTE — PROGRESS NOTE ADULT - SUBJECTIVE AND OBJECTIVE BOX
[facility] NEPHROLOGY- PROGRESS NOTE    64y Female with history of CHF presents as a transfer for LE CO2 angiogram. Nephrology consulted for elevated Scr.      REVIEW OF SYSTEMS:  Gen: no fevers  Cards: no chest pain  Resp: no dyspnea  GI: no nausea or vomiting or diarrhea  Vascular: + LE edema improving    Augmentin (Stomach Upset; Vomiting; Nausea)  No Known Allergies      Hospital Medications: Medications reviewed      VITALS:  T(F): 98.7 (03-10-25 @ 04:37), Max: 98.7 (03-10-25 @ 04:37)  HR: 100 (03-10-25 @ 04:37)  BP: 101/64 (03-10-25 @ 04:37)  RR: 18 (03-10-25 @ 04:37)  SpO2: 93% (03-10-25 @ 04:37)  Wt(kg): --    03-09 @ 07:01  -  03-10 @ 07:00  --------------------------------------------------------  IN: 240 mL / OUT: 600 mL / NET: -360 mL    03-10 @ 07:01  -  03-10 @ 09:51  --------------------------------------------------------  IN: 120 mL / OUT: 0 mL / NET: 120 mL        PHYSICAL EXAM:  Gen: NAD, calm  Cards: RRR, +S1/S2, no M/G/R  Resp: bibasilar rales  GI: soft, NT/ND, NABS  : no stiles  Vascular: + LE wrapped B/L  + RIJ subclavian        LABS:  03-10    132[L]  |  95[L]  |  43[H]  ----------------------------<  132[H]  4.7   |  20[L]  |  4.40[H]    Ca    8.9      10 Mar 2025 05:55  Phos  4.0     03-10  Mg     2.0     03-10    TPro  7.6  /  Alb  3.0[L]  /  TBili  0.8  /  DBili  0.3  /  AST  19  /  ALT  16  /  AlkPhos  121[H]  03-09    Creatinine Trend: 4.40 <--, 3.84 <--, 3.05 <--, 4.46 <--, 3.63 <--, 2.35 <--, 2.36 <--                        10.0   8.88  )-----------( 166      ( 09 Mar 2025 06:01 )             28.7     Urine Studies:  Urinalysis Basic - ( 10 Mar 2025 05:55 )    Color:  / Appearance:  / SG:  / pH:   Gluc: 132 mg/dL / Ketone:   / Bili:  / Urobili:    Blood:  / Protein:  / Nitrite:    Leuk Esterase:  / RBC:  / WBC    Sq Epi:  / Non Sq Epi:  / Bacteria:

## 2025-03-10 NOTE — PROGRESS NOTE ADULT - SUBJECTIVE AND OBJECTIVE BOX
MR#[id number]  PATIENT NAME:COLE ALBA    DATE OF SERVICE: 03-10-25 @ 06:05  Patient was seen and examined by Yordy Gilmore MD on    03-10-25 @ 06:05 .  Interim events noted.Consultant notes ,Labs,Telemetry reviewed by me       HOSPITAL COURSE: HPI:  64F, hx of CHF (last TTE on 1/16/25 EF 30-35%, G2DD), DM, diabetic foot ulcer with a recent admission at [facility] for CHF exacerbation presented as a transfer for worsening R. leg pain and fluid secretion, On non-invasive imaging demonstrating severe depletion of RLE blood flow beyond the popliteal vessel at knee and similar findings in L. Was transferred to [facility] for CO2 angiogram with Dr. Baltazar. (29 Jan 2025 20:05)      INTERIM EVENTS:Patient seen at bedside ,interim events noted.  02/14-Had cath Multivessel CAD started on Milrinone for CTS evaluation albeit targets not optimal  02/15-Awake on Milrinone and Bumex gtt-seen by CTS not a surgical candidate-needs Vascular work-up for LE PAD-scheduled for Angiogram on Wednesday    Weight 170Kg---> 164 Kg--->161.1 Kg---151.2 ---> 155 --> 154.7 ---> 158 --> 148--->147 >148 --> 146 --> 141 Kg    02/25-s/p RLE angiogram with pop and AT angioplasty   02/27-Awake had iHD plan for High risk PCI oLM/LAD tomorrow 1000mL removed  Had ? pAF ~~3 secs  02/28-Awake no dyspnea chest pain plan for PCI-oLM/LAD todat HD after PCI-Sinus rhythm  03/01-Had LHC PCWP-15 still elevated with low BP Plan to dialyse over weekend and plan for PCI on Monday-No dyspnea  03/02-Awake had HD removed 1500Ml no dyspnea Plan for PCI tomorrow  03/03-Awake Sinus rhythm no dyspnea FOR High risk PCI oLM/LAD   03/04-Awake had HD last night-1500mL removed For High risk PCI today  03/05-Awake s/p LHC VERA x 1 to LAD, VERA to LM , POBA to CX via RFA  03/06-Awake no dyspnea chest pain  03/07-Awake poor UOP plan to continue HD  03/08-Awake Sinus Rhythm CTPA No PE Had HD 1500mL removed  03/09 Awake no dyspnea Sinus Rhythm-Had HD 2900  mL removed noted to be Tachy -Sinus~~120's    03/10-Awake not orthopneac less Tachy~~100's      PMH -reviewed admission note, no change since admission  HEART FAILURE: Acute[x ]Chronic[ ] Systolic[x ] Diastolic[ ] Combined Systolic and Diastolic[ ]  CAD[x ] CABG[ ] PCI[ ]  DEVICES[ ] PPM[ ] ICD[ ] ILR[ ]  ATRIAL FIBRILLATION[ ] Paroxysmal[ ] Permanent[ ] CHADS2-[  ]  JAMEL[x ] CKD1[ ] CKD2[ ] CKD3[ ] CKD4[x ] ESRD[ ]  COPD[ ] HTN[x ]   DM[x ] Type1[ ] Type 2[x ]   CVA[ ] Paresis[ ]    AMBULATION: Assisted[x ] Cane/walker[ ] Independent[ ]    MEDICATIONS  (STANDING):  aspirin enteric coated 81 milliGRAM(s) Oral daily  atorvastatin 40 milliGRAM(s) Oral at bedtime  benzocaine/menthol Lozenge 1 Lozenge Oral once  bisacodyl 5 milliGRAM(s) Oral every 12 hours  chlorhexidine 4% Liquid 1 Application(s) Topical <User Schedule>  clopidogrel Tablet 75 milliGRAM(s) Oral daily  clopidogrel Tablet      glucagon  Injectable 1 milliGRAM(s) IntraMuscular once  heparin   Injectable 5000 Unit(s) SubCutaneous every 8 hours  insulin glargine Injectable (LANTUS) 12 Unit(s) SubCutaneous at bedtime  insulin lispro (ADMELOG) corrective regimen sliding scale   SubCutaneous three times a day before meals  insulin lispro (ADMELOG) corrective regimen sliding scale   SubCutaneous at bedtime  insulin lispro Injectable (ADMELOG) 3 Unit(s) SubCutaneous three times a day with meals  metoprolol tartrate 12.5 milliGRAM(s) Oral every 8 hours  midodrine. 10 milliGRAM(s) Oral once  Nephro-rober 1 Tablet(s) Oral daily  pantoprazole  Injectable 40 milliGRAM(s) IV Push daily  polyethylene glycol 3350 17 Gram(s) Oral daily  senna 2 Tablet(s) Oral at bedtime    MEDICATIONS  (PRN):  acetaminophen     Tablet .. 650 milliGRAM(s) Oral every 6 hours PRN Mild Pain (1 - 3)  dextrose Oral Gel 15 Gram(s) Oral once PRN Blood Glucose LESS THAN 70 milliGRAM(s)/deciliter  HYDROmorphone  Injectable 1 milliGRAM(s) IV Push every 6 hours PRN Severe Pain (7 - 10)  melatonin 3 milliGRAM(s) Oral at bedtime PRN Insomnia  ondansetron Injectable 4 milliGRAM(s) IV Push every 6 hours PRN Nausea and/or Vomiting  sodium chloride 0.65% Nasal 1 Spray(s) Both Nostrils three times a day PRN Dryness  sodium chloride 0.9% lock flush 10 milliLiter(s) IV Push every 1 hour PRN Pre/post blood products, medications, blood draw, and to maintain line patency            REVIEW OF SYSTEMS:  Constitutional: [ ] fever, [ ]weight loss,  [x ]fatigue [ ]weight gain  Eyes: [ ] visual changes  Respiratory: [ ]shortness of breath;  [ ] cough, [ ]wheezing, [ ]chills, [ ]hemoptysis  Cardiovascular: [ ] chest pain, [ ]palpitations, [ ]dizziness,  [ ]leg swelling[ ]orthopnea[ ]PND  Gastrointestinal: [ ] abdominal pain, [ ]nausea, [ ]vomiting,  [ ]diarrhea [ ]Constipation [ ]Melena  Genitourinary: [ ] dysuria, [ ] hematuria [ ][place]  Neurologic: [ ] headaches [ ] tremors[ ]weakness [ ]Paralysis Right[ ] Left[ ]  Skin: [ ] itching, [ ]burning, [ ] rashes  Endocrine: [ ] heat or cold intolerance  Musculoskeletal: [ ] joint pain or swelling; [ ] muscle, back, or extremity pain  Psychiatric: [ ] depression, [ ]anxiety, [ ]mood swings, or [ ]difficulty sleeping  Hematologic: [ ] easy bruising, [ ] bleeding gums    [ ] All remaining systems negative except as per above.   [ ]Unable to obtain.  [x] No change in ROS since admission      Vital Signs Last 24 Hrs  T(C): 37.1 (10 Mar 2025 04:37), Max: 37.1 (10 Mar 2025 04:37)  T(F): 98.7 (10 Mar 2025 04:37), Max: 98.7 (10 Mar 2025 04:37)  HR: 100 (10 Mar 2025 04:37) (90 - 122)  BP: 101/64 (10 Mar 2025 04:37) (92/55 - 105/64)  RR: 18 (10 Mar 2025 04:37) (17 - 18)  SpO2: 93% (10 Mar 2025 04:37) (93% - 98%)    Parameters below as of 10 Mar 2025 04:37  Patient On (Oxygen Delivery Method): room air      I&O's Summary    08 Mar 2025 06:01  -  09 Mar 2025 07:00  --------------------------------------------------------  IN: 360 mL / OUT: 2900 mL / NET: -2540 mL    09 Mar 2025 07:01  -  10 Mar 2025 06:06  --------------------------------------------------------  IN: 240 mL / OUT: 600 mL / NET: -360 mL        PHYSICAL EXAM:  General: No acute distress BMI-25  HEENT: EOMI, PERRL  Neck: Supple, [ ] JVD  Lungs: Equal air entry bilaterally; [ ] rales [ ] wheezing [ ] rhonchi  Heart: Regular rate and rhythm; [x ] murmur   2/6 [ x] systolic [ ] diastolic [ ] radiation[ ] rubs [ ]  gallops  Abdomen: Nontender, bowel sounds present  Extremities: No clubbing, cyanosis, [ ] edema [x ]Warm, well perfused        RLE: Dressings in place, blistering and ulceration on the dorsal aspect of the foot        LLE: First digit dry gangrene on the plantar aspect  Nervous system:  Alert & Oriented X3, no focal deficits  Psychiatric: Normal affect  Skin: No rashes or lesions    LABS:  03-09    131[L]  |  94[L]  |  35[H]  ----------------------------<  133[H]  4.5   |  21[L]  |  3.84[H]    Ca    8.6      09 Mar 2025 06:01  Phos  3.3     03-08  Mg     2.0     03-08    TPro  7.6  /  Alb  3.0[L]  /  TBili  0.8  /  DBili  0.3  /  AST  19  /  ALT  16  /  AlkPhos  121[H]  03-09    Creatinine Trend: 3.84<--, 3.05<--, 4.46<--, 3.63<--, 2.35<--, 2.36<--                        10.0   8.88  )-----------( 166      ( 09 Mar 2025 06:01 )             28.7           Xray Chest 1 View- PORTABLE-Urgent (Xray Chest 1 View- PORTABLE-Urgent .) (03.07.25 @ 11:32) >  COMPARISON: 2/27/2025    FINDINGS: The heart size cannot be adequately assessed on this single  view. There is a right-sided dialysis catheter with tip in the superior  vena cava. There are trace bilateral pleural effusions and  mild-to-moderate pulmonary edema. No focal consolidation is seen. No  pneumothorax.    IMPRESSION: Trace bilateral pleural effusions and mild-to-moderate  pulmonary edema.       CT Angio Chest PE Protocol w/ IV Cont (03.07.25 @ 11:32) >  IMPRESSION:  *  No pulmonary embolism.  *  Moderate right and small left pleural effusions.  *  Nonspecific peripheral groundglass opacities in the bilateral lungs,  slightly more prominent when compared to prior examination on 1/25/2025.    12 Lead ECG (03.04.25 @ 10:50) >   SINUS TACHYCARDIA  NONSPECIFIC ST AND T WAVE ABNORMALITY  ABNORMAL ECG       Cardiac Catheterization (03.04.25 @ 09:07) >  Conclusions:   Impella placed for HR-PCI (pt was turned down for CABG). EF 25%     Severe proximal LCx stenosis s/p successful rotational atherectomy and balloon angioplasty.   Severe LM and proximal LAD stenosis s/p successful rotational atherectomy, balloon angioplasty, and VERA x2.  Recommendations:   Triple therapy for one day and then continue Eliquis and Plavix.

## 2025-03-10 NOTE — PROGRESS NOTE ADULT - ASSESSMENT
63 y/o F with PMH of CHF (last TTE 1/2025 with EF 30-35%), DM, diabetic foot ulcer, PAD, CAD. Recent admission at [facility] for CHF exacerbation, presented to [facility] as a transfer for worsening R leg pain. Hospital course c/b acute on chronic CHF - TTE with EF 20%, severely decreased LV and RV function, multiple regional motion abnormalities, s/p LHC revealing TVD, pleural/pericardial effusions, JAMEL, leukocytosis suspected to be in the setting of LE wound on IV ABX, persistent RLE pain w/ RLE Arterial Duplex revealing popliteal artery occlusion  Plan for eventual angiogram with vascular when medically optimized. Pulmonary called to consult as pt with c/o SOB.

## 2025-03-10 NOTE — PROGRESS NOTE ADULT - SUBJECTIVE AND OBJECTIVE BOX
Name of Patient : TOMASZ ALBA  MRN: [id number]  Date of visit: 03-10-25 @ 14:57      Subjective: Patient seen and examined. No new events except as noted.   Patient seen earlier this AM. Sitting up in bed. Reports feeling better overall.   For TTE today    REVIEW OF SYSTEMS:    CONSTITUTIONAL: Generalized weakness   EYES/ENT: No visual changes;  No vertigo or throat pain   NECK: No pain or stiffness  RESPIRATORY: No cough, wheezing, hemoptysis; No shortness of breath  CARDIOVASCULAR: No chest pain or palpitations  GASTROINTESTINAL: No abdominal or epigastric pain. No nausea, vomiting, or hematemesis; No diarrhea or constipation. No melena or hematochezia.  GENITOURINARY: No dysuria, frequency or hematuria  NEUROLOGICAL: No numbness or weakness  SKIN: LE wrapped in ACE Wrap   All other review of systems is negative unless indicated above.    MEDICATIONS:  MEDICATIONS  (STANDING):  aspirin enteric coated 81 milliGRAM(s) Oral daily  atorvastatin 40 milliGRAM(s) Oral at bedtime  benzocaine/menthol Lozenge 1 Lozenge Oral once  bisacodyl 5 milliGRAM(s) Oral every 12 hours  chlorhexidine 4% Liquid 1 Application(s) Topical <User Schedule>  clopidogrel Tablet 75 milliGRAM(s) Oral daily  clopidogrel Tablet      dextrose 5%. 1000 milliLiter(s) (100 mL/Hr) IV Continuous <Continuous>  dextrose 5%. 1000 milliLiter(s) (50 mL/Hr) IV Continuous <Continuous>  dextrose 50% Injectable 25 Gram(s) IV Push once  dextrose 50% Injectable 12.5 Gram(s) IV Push once  dextrose 50% Injectable 25 Gram(s) IV Push once  glucagon  Injectable 1 milliGRAM(s) IntraMuscular once  heparin   Injectable 5000 Unit(s) SubCutaneous every 8 hours  hydrALAZINE 10 milliGRAM(s) Oral every 8 hours  insulin glargine Injectable (LANTUS) 12 Unit(s) SubCutaneous at bedtime  insulin lispro (ADMELOG) corrective regimen sliding scale   SubCutaneous three times a day before meals  insulin lispro (ADMELOG) corrective regimen sliding scale   SubCutaneous at bedtime  insulin lispro Injectable (ADMELOG) 5 Unit(s) SubCutaneous three times a day with meals  midodrine. 10 milliGRAM(s) Oral once  Nephro-rober 1 Tablet(s) Oral daily  pantoprazole  Injectable 40 milliGRAM(s) IV Push daily  polyethylene glycol 3350 17 Gram(s) Oral daily  senna 2 Tablet(s) Oral at bedtime      PHYSICAL EXAM:  T(C): 37.1 (03-10-25 @ 04:37), Max: 37.1 (03-10-25 @ 04:37)  HR: 100 (03-10-25 @ 04:37) (90 - 106)  BP: 101/64 (03-10-25 @ 04:37) (95/58 - 105/64)  RR: 18 (03-10-25 @ 04:37) (18 - 18)  SpO2: 93% (03-10-25 @ 04:37) (93% - 98%)  Wt(kg): --  I&O's Summary    09 Mar 2025 07:01  -  10 Mar 2025 07:00  --------------------------------------------------------  IN: 240 mL / OUT: 600 mL / NET: -360 mL    10 Mar 2025 07:01  -  10 Mar 2025 14:57  --------------------------------------------------------  IN: 240 mL / OUT: 0 mL / NET: 240 mL          Appearance: Normal	  HEENT: Eyes are open   Lymphatic: No lymphadenopathy grossly   Cardiovascular: Normal S1 S2   Respiratory: normal effort , clear  Gastrointestinal:  Soft, Non-tender  Skin: + [place]; No rashes,  warm to touch  Psychiatry:  Mood & affect appropriate  Musculoskeletal: B/L LE wrapped in ACE wrap                   03-09-25 @ 07:01  -  03-10-25 @ 07:00  --------------------------------------------------------  IN: 240 mL / OUT: 600 mL / NET: -360 mL    03-10-25 @ 07:01  -  03-10-25 @ 14:57  --------------------------------------------------------  IN: 240 mL / OUT: 0 mL / NET: 240 mL                              10.0   8.88  )-----------( 166      ( 09 Mar 2025 06:01 )             28.7               03-10    132[L]  |  95[L]  |  43[H]  ----------------------------<  132[H]  4.7   |  20[L]  |  4.40[H]    Ca    8.9      10 Mar 2025 05:55  Phos  4.0     03-10  Mg     2.0     03-10    TPro  7.6  /  Alb  3.0[L]  /  TBili  0.8  /  DBili  0.3  /  AST  19  /  ALT  16  /  AlkPhos  121[H]  03-09                       Urinalysis Basic - ( 10 Mar 2025 05:55 )    Color: x / Appearance: x / SG: x / pH: x  Gluc: 132 mg/dL / Ketone: x  / Bili: x / Urobili: x   Blood: x / Protein: x / Nitrite: x   Leuk Esterase: x / RBC: x / WBC x   Sq Epi: x / Non Sq Epi: x / Bacteria: x

## 2025-03-11 LAB
ANION GAP SERPL CALC-SCNC: 15 MMOL/L — SIGNIFICANT CHANGE UP (ref 5–17)
APTT BLD: 29.6 SEC — SIGNIFICANT CHANGE UP (ref 24.5–35.6)
BLD GP AB SCN SERPL QL: NEGATIVE — SIGNIFICANT CHANGE UP
BUN SERPL-MCNC: 26 MG/DL — HIGH (ref 7–23)
CALCIUM SERPL-MCNC: 8.6 MG/DL — SIGNIFICANT CHANGE UP (ref 8.4–10.5)
CHLORIDE SERPL-SCNC: 96 MMOL/L — SIGNIFICANT CHANGE UP (ref 96–108)
CO2 SERPL-SCNC: 22 MMOL/L — SIGNIFICANT CHANGE UP (ref 22–31)
CREAT SERPL-MCNC: 3.08 MG/DL — HIGH (ref 0.5–1.3)
EGFR: 16 ML/MIN/1.73M2 — LOW
EGFR: 16 ML/MIN/1.73M2 — LOW
GAS PNL BLDV: SIGNIFICANT CHANGE UP
GLUCOSE BLDC GLUCOMTR-MCNC: 189 MG/DL — HIGH (ref 70–99)
GLUCOSE BLDC GLUCOMTR-MCNC: 255 MG/DL — HIGH (ref 70–99)
GLUCOSE BLDC GLUCOMTR-MCNC: 257 MG/DL — HIGH (ref 70–99)
GLUCOSE BLDC GLUCOMTR-MCNC: 296 MG/DL — HIGH (ref 70–99)
GLUCOSE SERPL-MCNC: 180 MG/DL — HIGH (ref 70–99)
HCT VFR BLD CALC: 27.7 % — LOW (ref 34.5–45)
HGB BLD-MCNC: 9.6 G/DL — LOW (ref 11.5–15.5)
HGB BLDA-MCNC: 9.3 G/DL — LOW (ref 11.7–16.1)
INR BLD: 1.27 RATIO — HIGH (ref 0.85–1.16)
MAGNESIUM SERPL-MCNC: 1.9 MG/DL — SIGNIFICANT CHANGE UP (ref 1.6–2.6)
MCHC RBC-ENTMCNC: 25.6 PG — LOW (ref 27–34)
MCHC RBC-ENTMCNC: 34.7 G/DL — SIGNIFICANT CHANGE UP (ref 32–36)
MCV RBC AUTO: 73.9 FL — LOW (ref 80–100)
NRBC BLD AUTO-RTO: 0 /100 WBCS — SIGNIFICANT CHANGE UP (ref 0–0)
OXYHGB MFR BLDA: 96.8 % — HIGH (ref 90–95)
PHOSPHATE SERPL-MCNC: 3.3 MG/DL — SIGNIFICANT CHANGE UP (ref 2.5–4.5)
PLATELET # BLD AUTO: 211 K/UL — SIGNIFICANT CHANGE UP (ref 150–400)
POTASSIUM SERPL-MCNC: 3.9 MMOL/L — SIGNIFICANT CHANGE UP (ref 3.5–5.3)
POTASSIUM SERPL-SCNC: 3.9 MMOL/L — SIGNIFICANT CHANGE UP (ref 3.5–5.3)
PROTHROM AB SERPL-ACNC: 14.4 SEC — HIGH (ref 9.9–13.4)
RBC # BLD: 3.75 M/UL — LOW (ref 3.8–5.2)
RBC # FLD: 22.1 % — HIGH (ref 10.3–14.5)
RH IG SCN BLD-IMP: NEGATIVE — SIGNIFICANT CHANGE UP
SAO2 % BLDA: 98.7 % — HIGH (ref 94–98)
SODIUM SERPL-SCNC: 133 MMOL/L — LOW (ref 135–145)
WBC # BLD: 9.22 K/UL — SIGNIFICANT CHANGE UP (ref 3.8–10.5)
WBC # FLD AUTO: 9.22 K/UL — SIGNIFICANT CHANGE UP (ref 3.8–10.5)

## 2025-03-11 PROCEDURE — 36558 INSERT TUNNELED CV CATH: CPT | Mod: RT

## 2025-03-11 PROCEDURE — 93010 ELECTROCARDIOGRAM REPORT: CPT | Mod: 76

## 2025-03-11 PROCEDURE — 99232 SBSQ HOSP IP/OBS MODERATE 35: CPT

## 2025-03-11 RX ORDER — INSULIN GLARGINE-YFGN 100 [IU]/ML
12 INJECTION, SOLUTION SUBCUTANEOUS AT BEDTIME
Refills: 0 | Status: DISCONTINUED | OUTPATIENT
Start: 2025-03-11 | End: 2025-03-12

## 2025-03-11 RX ORDER — EPOETIN ALFA 10000 [IU]/ML
8000 SOLUTION INTRAVENOUS; SUBCUTANEOUS ONCE
Refills: 0 | Status: DISCONTINUED | OUTPATIENT
Start: 2025-03-11 | End: 2025-03-12

## 2025-03-11 RX ORDER — MIDODRINE HYDROCHLORIDE 5 MG/1
10 TABLET ORAL ONCE
Refills: 0 | Status: COMPLETED | OUTPATIENT
Start: 2025-03-12 | End: 2025-03-12

## 2025-03-11 RX ORDER — INSULIN LISPRO 100 U/ML
3 INJECTION, SOLUTION INTRAVENOUS; SUBCUTANEOUS
Refills: 0 | Status: DISCONTINUED | OUTPATIENT
Start: 2025-03-11 | End: 2025-03-12

## 2025-03-11 RX ADMIN — INSULIN GLARGINE-YFGN 12 UNIT(S): 100 INJECTION, SOLUTION SUBCUTANEOUS at 21:40

## 2025-03-11 RX ADMIN — INSULIN LISPRO 1: 100 INJECTION, SOLUTION INTRAVENOUS; SUBCUTANEOUS at 21:41

## 2025-03-11 RX ADMIN — ATORVASTATIN CALCIUM 40 MILLIGRAM(S): 80 TABLET, FILM COATED ORAL at 21:40

## 2025-03-11 RX ADMIN — Medication 650 MILLIGRAM(S): at 09:55

## 2025-03-11 RX ADMIN — INSULIN LISPRO 3 UNIT(S): 100 INJECTION, SOLUTION INTRAVENOUS; SUBCUTANEOUS at 12:21

## 2025-03-11 RX ADMIN — Medication 1 TABLET(S): at 12:20

## 2025-03-11 RX ADMIN — INSULIN LISPRO 3: 100 INJECTION, SOLUTION INTRAVENOUS; SUBCUTANEOUS at 12:21

## 2025-03-11 RX ADMIN — Medication 1 MILLIGRAM(S): at 02:57

## 2025-03-11 RX ADMIN — Medication 1 APPLICATION(S): at 05:32

## 2025-03-11 RX ADMIN — Medication 1 MILLIGRAM(S): at 13:03

## 2025-03-11 RX ADMIN — Medication 650 MILLIGRAM(S): at 23:05

## 2025-03-11 RX ADMIN — Medication 40 MILLIGRAM(S): at 12:19

## 2025-03-11 RX ADMIN — Medication 1 MILLIGRAM(S): at 12:33

## 2025-03-11 RX ADMIN — CLOPIDOGREL BISULFATE 75 MILLIGRAM(S): 75 TABLET, FILM COATED ORAL at 12:19

## 2025-03-11 RX ADMIN — Medication 81 MILLIGRAM(S): at 12:19

## 2025-03-11 RX ADMIN — Medication 1 MILLIGRAM(S): at 03:50

## 2025-03-11 RX ADMIN — INSULIN LISPRO 3: 100 INJECTION, SOLUTION INTRAVENOUS; SUBCUTANEOUS at 17:39

## 2025-03-11 RX ADMIN — Medication 1 MILLIGRAM(S): at 23:05

## 2025-03-11 RX ADMIN — INSULIN LISPRO 3 UNIT(S): 100 INJECTION, SOLUTION INTRAVENOUS; SUBCUTANEOUS at 17:39

## 2025-03-11 NOTE — PROGRESS NOTE ADULT - SUBJECTIVE AND OBJECTIVE BOX
Name of Patient : TOMASZ ALBA  MRN: [id number]  Date of visit: 03-11-25       Subjective: Patient seen and examined. No new events except as noted.   permicath today    REVIEW OF SYSTEMS:    CONSTITUTIONAL: No weakness, fevers or chills  EYES/ENT: No visual changes;  No vertigo or throat pain   NECK: No pain or stiffness  RESPIRATORY: No cough, wheezing, hemoptysis; No shortness of breath  CARDIOVASCULAR: No chest pain or palpitations  GASTROINTESTINAL: No abdominal or epigastric pain. No nausea, vomiting, or hematemesis; No diarrhea or constipation. No melena or hematochezia.  GENITOURINARY: No dysuria, frequency or hematuria  NEUROLOGICAL: No numbness or weakness  SKIN: No itching, burning, rashes, or lesions   All other review of systems is negative unless indicated above.    MEDICATIONS:  MEDICATIONS  (STANDING):  aspirin enteric coated 81 milliGRAM(s) Oral daily  atorvastatin 40 milliGRAM(s) Oral at bedtime  benzocaine/menthol Lozenge 1 Lozenge Oral once  bisacodyl 5 milliGRAM(s) Oral every 12 hours  chlorhexidine 4% Liquid 1 Application(s) Topical <User Schedule>  clopidogrel Tablet 75 milliGRAM(s) Oral daily  clopidogrel Tablet      dextrose 5%. 1000 milliLiter(s) (100 mL/Hr) IV Continuous <Continuous>  dextrose 5%. 1000 milliLiter(s) (50 mL/Hr) IV Continuous <Continuous>  dextrose 50% Injectable 25 Gram(s) IV Push once  dextrose 50% Injectable 12.5 Gram(s) IV Push once  dextrose 50% Injectable 25 Gram(s) IV Push once  epoetin day (EPOGEN) Injectable 8000 Unit(s) SubCutaneous once  glucagon  Injectable 1 milliGRAM(s) IntraMuscular once  hydrALAZINE 10 milliGRAM(s) Oral every 8 hours  insulin glargine Injectable (LANTUS) 12 Unit(s) SubCutaneous at bedtime  insulin lispro (ADMELOG) corrective regimen sliding scale   SubCutaneous three times a day before meals  insulin lispro (ADMELOG) corrective regimen sliding scale   SubCutaneous at bedtime  insulin lispro Injectable (ADMELOG) 3 Unit(s) SubCutaneous three times a day with meals  metoprolol succinate ER 25 milliGRAM(s) Oral daily  Nephro-rober 1 Tablet(s) Oral daily  pantoprazole  Injectable 40 milliGRAM(s) IV Push daily  polyethylene glycol 3350 17 Gram(s) Oral daily  senna 2 Tablet(s) Oral at bedtime      PHYSICAL EXAM:  T(C): 36.9 (03-11-25 @ 21:46), Max: 36.9 (03-11-25 @ 21:46)  HR: 103 (03-11-25 @ 21:46) (78 - 103)  BP: 99/62 (03-11-25 @ 21:46) (86/51 - 108/57)  RR: 18 (03-11-25 @ 21:46) (18 - 25)  SpO2: 96% (03-11-25 @ 21:46) (89% - 100%)  Wt(kg): --  I&O's Summary    10 Mar 2025 07:01  -  11 Mar 2025 07:00  --------------------------------------------------------  IN: 480 mL / OUT: 1500 mL / NET: -1020 mL      Height (cm): 162.6 (03-11 @ 08:16)  Weight (kg): 72.1 (03-11 @ 08:16)  BMI (kg/m2): 27.3 (03-11 @ 08:16)  BSA (m2): 1.77 (03-11 @ 08:16)    Appearance: Normal	  HEENT:  PERRLA   Lymphatic: No lymphadenopathy   Cardiovascular: Normal S1 S2, no JVD  Respiratory: normal effort , clear  Gastrointestinal:  Soft, Non-tender  Skin: No rashes,  warm to touch  Psychiatry:  Mood & affect appropriate  Musculuskeletal: LE dressing    recent labs, Imaging and EKGs personally reviewed   CODE status discussed with the patient in detail    03-10-25 @ 07:01  -  03-11-25 @ 07:00  --------------------------------------------------------  IN: 480 mL / OUT: 1500 mL / NET: -1020 mL                          9.6    9.22  )-----------( 211      ( 11 Mar 2025 04:52 )             27.7               03-11    133[L]  |  96  |  26[H]  ----------------------------<  180[H]  3.9   |  22  |  3.08[H]    Ca    8.6      11 Mar 2025 04:52  Phos  3.3     03-11  Mg     1.9     03-11      PT/INR - ( 11 Mar 2025 04:52 )   PT: 14.4 sec;   INR: 1.27 ratio         PTT - ( 11 Mar 2025 04:52 )  PTT:29.6 sec                   Urinalysis Basic - ( 11 Mar 2025 04:52 )    Color: x / Appearance: x / SG: x / pH: x  Gluc: 180 mg/dL / Ketone: x  / Bili: x / Urobili: x   Blood: x / Protein: x / Nitrite: x   Leuk Esterase: x / RBC: x / WBC x   Sq Epi: x / Non Sq Epi: x / Bacteria: x

## 2025-03-11 NOTE — PROVIDER CONTACT NOTE (OTHER) - ACTION/TREATMENT ORDERED:
provider notified, EKG, fs, STAT lab obtained. BP rechecked 95/62, . Provider at bedside provider made aware

## 2025-03-11 NOTE — PROGRESS NOTE ADULT - PROBLEM SELECTOR PLAN 1
-Primarily with exertion & when laying flat. Pt denies SOB at rest.  -Suspect 2nd to fluid overload, underlying CHF  -Keep O>I as tolerated  -VBG 2/14 acceptable  -S/p ABX per primary team for LE wounds  -Keep sats >90% with O2 PRN (currently RA).  -SOB and cough improved  -Incentive spirometry use encouraged.  -CTA chest with b/l pl effusions R>L, congestion, negative for PE.

## 2025-03-11 NOTE — PROGRESS NOTE ADULT - SUBJECTIVE AND OBJECTIVE BOX
MR#[id number]  PATIENT NAME:COLE ALBA    DATE OF SERVICE: 03-11-25 @ 06:40  Patient was seen and examined by Yordy Gilmore MD on    03-11-25 @ 06:40 .  Interim events noted.Consultant notes ,Labs,Telemetry reviewed by me       HOSPITAL COURSE: HPI:  64F, hx of CHF (last TTE on 1/16/25 EF 30-35%, G2DD), DM, diabetic foot ulcer with a recent admission at [facility] for HF exacerbation presented as a transfer for worsening R. leg pain and fluid secretion, On non-invasive imaging demonstrating severe depletion of RLE blood flow beyond the popliteal vessel at knee and similar findings in L. Was transferred to [facility] for CO2 angiogram with Dr. Baltazar. (29 Jan 2025 20:05)      INTERIM EVENTS:Patient seen at bedside ,interim events noted.  64F, hx of CHF (last TTE on 1/16/25 EF 30-35%, G2DD), DM, diabetic foot ulcer with a recent admission at [facility] for CHF exacerbation presented as a transfer for worsening R. leg pain and fluid secretion, On non-invasive imaging demonstrating severe depletion of RLE blood flow beyond the popliteal vessel at knee and similar findings in L. Was transferred to [facility] for CO2 angiogram with Dr. Baltazar. (29 Jan 2025 20:05)      INTERIM EVENTS:Patient seen at bedside ,interim events noted.  02/14-Had cath Multivessel CAD started on Milrinone for CTS evaluation albeit targets not optimal  02/15-Awake on Milrinone and Bumex gtt-seen by CTS not a surgical candidate-needs Vascular work-up for LE PAD-scheduled for Angiogram on Wednesday    Weight 170Kg---> 164 Kg--->161.1 Kg---151.2 ---> 155 --> 154.7 ---> 158 --> 148--->147 >148 --> 146 --> 141---> 132  Kg    02/25-s/p RLE angiogram with pop and AT angioplasty   02/27-Awake had iHD plan for High risk PCI oLM/LAD tomorrow 1000mL removed  Had ? pAF ~~3 secs  02/28-Awake no dyspnea chest pain plan for PCI-oLM/LAD todat HD after PCI-Sinus rhythm  03/01-Had [facility] PCWP-15 still elevated with low BP Plan to dialyse over weekend and plan for PCI on Monday-No dyspnea  03/02-Awake had HD removed 1500Ml no dyspnea Plan for PCI tomorrow  03/03-Awake Sinus rhythm no dyspnea FOR High risk PCI oLM/LAD   03/04-Awake had HD last night-1500mL removed For High risk PCI today  03/05-Awake s/p LHC VERA x 1 to LAD, VERA to LM , POBA to CX via RFA  03/06-Awake no dyspnea chest pain  03/07-Awake poor UOP plan to continue HD  03/08-Awake Sinus Rhythm CTPA No PE Had HD 1500mL removed  03/09 Awake no dyspnea Sinus Rhythm-Had HD 2900  mL removed noted to be Tachy -Sinus~~120's  03/10-Awake not orthopneac less Tachy~~100's  03/11-Awake Had HD 1500mL removed episode sinus bradycardia          PMH -reviewed admission note, no change since admission  HEART FAILURE: Acute[x ]Chronic[ ] Systolic[x ] Diastolic[ ] Combined Systolic and Diastolic[ ]  CAD[x ] CABG[ ] PCI[ ]  DEVICES[ ] PPM[ ] ICD[ ] ILR[ ]  ATRIAL FIBRILLATION[ ] Paroxysmal[ ] Permanent[ ] CHADS2-[  ]  JAMEL[x ] CKD1[ ] CKD2[ ] CKD3[ ] CKD4[x ] ESRD[ ]  COPD[ ] HTN[x ]   DM[x ] Type1[ ] Type 2[x ]   CVA[ ] Paresis[ ]    AMBULATION: Assisted[x ] Cane/walker[ ] Independent[ ]        MEDICATIONS  (STANDING):  aspirin enteric coated 81 milliGRAM(s) Oral daily  atorvastatin 40 milliGRAM(s) Oral at bedtime  benzocaine/menthol Lozenge 1 Lozenge Oral once  bisacodyl 5 milliGRAM(s) Oral every 12 hours  chlorhexidine 4% Liquid 1 Application(s) Topical <User Schedule>  clopidogrel Tablet 75 milliGRAM(s) Oral daily  clopidogrel Tablet      glucagon  Injectable 1 milliGRAM(s) IntraMuscular once  hydrALAZINE 10 milliGRAM(s) Oral every 8 hours  insulin glargine Injectable (LANTUS) 8 Unit(s) SubCutaneous at bedtime  insulin lispro (ADMELOG) corrective regimen sliding scale   SubCutaneous three times a day before meals  insulin lispro (ADMELOG) corrective regimen sliding scale   SubCutaneous at bedtime  insulin lispro Injectable (ADMELOG) 5 Unit(s) SubCutaneous three times a day with meals  metoprolol succinate ER 25 milliGRAM(s) Oral daily  Nephro-rober 1 Tablet(s) Oral daily  pantoprazole  Injectable 40 milliGRAM(s) IV Push daily  polyethylene glycol 3350 17 Gram(s) Oral daily  senna 2 Tablet(s) Oral at bedtime    MEDICATIONS  (PRN):  acetaminophen     Tablet .. 650 milliGRAM(s) Oral every 6 hours PRN Mild Pain (1 - 3)  dextrose Oral Gel 15 Gram(s) Oral once PRN Blood Glucose LESS THAN 70 milliGRAM(s)/deciliter  HYDROmorphone  Injectable 1 milliGRAM(s) IV Push every 6 hours PRN Severe Pain (7 - 10)  melatonin 3 milliGRAM(s) Oral at bedtime PRN Insomnia  ondansetron Injectable 4 milliGRAM(s) IV Push every 6 hours PRN Nausea and/or Vomiting  sodium chloride 0.65% Nasal 1 Spray(s) Both Nostrils three times a day PRN Dryness  sodium chloride 0.9% lock flush 10 milliLiter(s) IV Push every 1 hour PRN Pre/post blood products, medications, blood draw, and to maintain line patency            REVIEW OF SYSTEMS:  Constitutional: [ ] fever, [ ]weight loss,  [x ]fatigue [ ]weight gain  Eyes: [ ] visual changes  Respiratory: [ ]shortness of breath;  [ ] cough, [ ]wheezing, [ ]chills, [ ]hemoptysis  Cardiovascular: [ ] chest pain, [ ]palpitations, [ ]dizziness,  [ ]leg swelling[ ]orthopnea[ ]PND  Gastrointestinal: [ ] abdominal pain, [ ]nausea, [ ]vomiting,  [ ]diarrhea [ ]Constipation [ ]Melena  Genitourinary: [ ] dysuria, [ ] hematuria [ ][place]  Neurologic: [ ] headaches [ ] tremors[ ]weakness [ ]Paralysis Right[ ] Left[ ]  Skin: [ ] itching, [ ]burning, [ ] rashes  Endocrine: [ ] heat or cold intolerance  Musculoskeletal: [ ] joint pain or swelling; [ ] muscle, back, or extremity pain  Psychiatric: [ ] depression, [ ]anxiety, [ ]mood swings, or [ ]difficulty sleeping  Hematologic: [ ] easy bruising, [ ] bleeding gums    [ ] All remaining systems negative except as per above.   [ ]Unable to obtain.  [x] No change in ROS since admission      Vital Signs Last 24 Hrs  T(C): 36.3 (11 Mar 2025 04:00), Max: 36.9 (10 Mar 2025 14:56)  T(F): 97.4 (11 Mar 2025 04:00), Max: 98.4 (10 Mar 2025 14:56)  HR: 102 (11 Mar 2025 04:30) (60 - 102)  BP: 95/62 (11 Mar 2025 04:30) (86/51 - 125/70)  RR: 18 (11 Mar 2025 04:00) (17 - 18)  SpO2: 94% (11 Mar 2025 04:00) (92% - 95%)    Parameters below as of 11 Mar 2025 04:00  Patient On (Oxygen Delivery Method): room air      I&O's Summary    09 Mar 2025 07:01  -  10 Mar 2025 07:00  --------------------------------------------------------  IN: 240 mL / OUT: 600 mL / NET: -360 mL    10 Mar 2025 07:01  -  11 Mar 2025 06:40  --------------------------------------------------------  IN: 480 mL / OUT: 1500 mL / NET: -1020 mL        PHYSICAL EXAM:  General: No acute distress BMI-24  HEENT: EOMI, PERRL  Neck: Supple, [ ] JVD  Lungs: Equal air entry bilaterally; [ ] rales [ ] wheezing [ ] rhonchi  Heart: Regular rate and rhythm; [x ] murmur   2/6 [ x] systolic [ ] diastolic [ ] radiation[ ] rubs [ ]  gallops  Abdomen: Nontender, bowel sounds present  Extremities: No clubbing, cyanosis, [x ] edema [ x]Warm, well perfused        RLE: Dressings in place, blistering and ulceration on the dorsal aspect of the foot        LLE: First digit dry gangrene on the plantar aspect  Nervous system:  Alert & Oriented X3, no focal deficits  Psychiatric: Normal affect  Skin: No rashes or lesions    LABS:  03-11    133[L]  |  96  |  26[H]  ----------------------------<  180[H]  3.9   |  22  |  3.08[H]    Ca    8.6      11 Mar 2025 04:52  Phos  3.3     03-11  Mg     1.9     03-11      Creatinine Trend: 3.08<--, 4.40<--, 3.84<--, 3.05<--, 4.46<--, 3.63<--                        9.6    9.22  )-----------( 211      ( 11 Mar 2025 04:52 )             27.7     PT/INR - ( 11 Mar 2025 04:52 )   PT: 14.4 sec;   INR: 1.27 ratio         PTT - ( 11 Mar 2025 04:52 )  PTT:29.6 sec       TTE W or WO Ultrasound Enhancing Agent (03.10.25 @ 12:48) >  CONCLUSIONS:      1. Left ventricular systolic function is severely decreased with an ejection fraction of 28 % by 3D. Regional wall motion abnormalities present.   2. Multiple segmental abnormalities exist.The basal and mid anterior septum, basal and mid inferior septum, basal and mid inferior wall, and mid inferolateral segment are akinetic.  The entire apex, entire anterior wall, basal and mid anterolateral wall, and basal inferolateral segment are hypokinetic.   3. Reduced right ventricular systolic function.   4. Compared to the transthoracic echocardiogram performed on 2/19/2025, there have been no significant interval changes.   5. Right pleural effusion noted.       VA Duplex Lower Ext Vein Scan, Bilat (03.10.25 @ 13:55) >    IMPRESSION:  No evidence of deep venous thrombosis in either lower extremity.

## 2025-03-11 NOTE — CHART NOTE - NSCHARTNOTEFT_GEN_A_CORE
Medicine PA Event Note     Notified by RN pt w/ sinus bradycardia on tele with HR in 48s for 3 minutes. Pt sleeping during this event, VS and she is asx.   Pt seen and examined at bedside, noted resting comfortably, in NAD. Pt is AOx4 and denies any dizziness, HA, visual changes, CP, SOB, difficulty breathing, N/V, abd pain. Pt states she was in a deep sleep around the time when tele event occurred.     Vital Signs Last 24 Hrs  T(C): 36.3 (11 Mar 2025 04:00), Max: 36.9 (10 Mar 2025 14:56)  T(F): 97.4 (11 Mar 2025 04:00), Max: 98.4 (10 Mar 2025 14:56)  HR: 102 (11 Mar 2025 04:30) (60 - 102)  BP: 95/62 (11 Mar 2025 04:30) (86/51 - 125/70)  RR: 18 (11 Mar 2025 04:00) (17 - 18)  SpO2: 94% (11 Mar 2025 04:00) (92% - 95%)    Parameters below as of 11 Mar 2025 04:00  Patient On (Oxygen Delivery Method): room air      > STAT EKG- NSR w/ HR , unchanged from previous ECG  > VH=881  > RN notified to draw AM labs early to check for any electrolyte abnormalities   > call bell within reach of pt   > pls c/w monitoring the pt on tele  > will d/w primary team in am and attending to follow

## 2025-03-11 NOTE — PROGRESS NOTE ADULT - ASSESSMENT
64y Female with history of CHF presents as a transfer for LE CO2 angiogram. Nephrology consulted for elevated Scr.    1) JAMEL: likely due to ATN and CRS for which patient initiated on RRT on 2/20. JAMEL exacerbated by contrast nephropathy with last HD on 3/10 tolerated well with 1.5L removed. Plan for additional HD on 3/12. S/P TDC placement this morning. S/P straight cath for urinary retention. Monitor serial bladder scans. Patient wishes to be discharged to [facility] with onsite HD. Avoid nephrotoxins.    2) HTN: BP low normal. On midodrine pre-HD to avoid intradialytic hypotension.    3) LE edema: Off diuretics. UF with HD. TTE with severely decreased LVSF. Monitor UO.     4) Anemia: Hb improving with low TSAT. No IV iron given elevated ferritin. S/P Epo 8K X 1 dose 3/1. Will give another dose today. Monitor Hb.    5) Hyponatremia: Secondary to JAMEL and polydipsia. High dialysate Na bath. Continue with 1L FR.      [facility] NEPHROLOGY  Raji Waterman M.D.  Mars Feliz D.O.  Kelley Parks M.D.  MD Nancy Kulkarni, MSN, ANP-C    Telephone: [phone]  Facsimile: [phone] [place] [address], [id number]  [place] [address]

## 2025-03-11 NOTE — PROGRESS NOTE ADULT - ASSESSMENT
65y/o F w/h/o uncontrolled T2DM (A1C 11.6%) on Tresiba and CeQur insulin patch PTA. DM c/b PAD, retinopathy, CKD, CAD. Also h/oType 2 DM, HTN, HLD. Presented to OSH with worsening right leg pain and fluid secretion. Transferred to [facility] for CO2 angiogram. Found to have Low EF to 20% in OSBALDO. s/p high risk PCI on 3/3. Endocrine consulted for Type 2 DM management, uncontrolled. Patient is s/p PermCath placement this am. Reports feeling good, eating full meals and tolerating POs. FBG slightly elevated and BG at noon high to 257mg/dL -> noted Lantus held last night, will keep Lantus dose as is for now. Noted low postprandial BG to 80s, will lower mealtime insulin to prevent hypoglycemia. Endocrine will closely monitor BG and adjust insulin as needed for BG goal 100-180mg/dL inpatient.        #uncontrolled Type 2 diabetes mellitus   A1C with Estimated Average Glucose Result: 11.6 % (01-24-25 @ 05:40)  A1C with Estimated Average Glucose Result: >15.5 % (12-13-24 @ 06:49)    Home regimen: Tresiba 40 units, CeQur insulin patch about 2-10 units TID with meals

## 2025-03-11 NOTE — PROGRESS NOTE ADULT - SUBJECTIVE AND OBJECTIVE BOX
Follow-up Pulm Progress Note    No new respiratory events overnight.  Denies SOB/CP.     Medications:  MEDICATIONS  (STANDING):  aspirin enteric coated 81 milliGRAM(s) Oral daily  atorvastatin 40 milliGRAM(s) Oral at bedtime  benzocaine/menthol Lozenge 1 Lozenge Oral once  bisacodyl 5 milliGRAM(s) Oral every 12 hours  chlorhexidine 4% Liquid 1 Application(s) Topical <User Schedule>  clopidogrel Tablet 75 milliGRAM(s) Oral daily  clopidogrel Tablet      dextrose 5%. 1000 milliLiter(s) (100 mL/Hr) IV Continuous <Continuous>  dextrose 5%. 1000 milliLiter(s) (50 mL/Hr) IV Continuous <Continuous>  dextrose 50% Injectable 25 Gram(s) IV Push once  dextrose 50% Injectable 12.5 Gram(s) IV Push once  dextrose 50% Injectable 25 Gram(s) IV Push once  glucagon  Injectable 1 milliGRAM(s) IntraMuscular once  hydrALAZINE 10 milliGRAM(s) Oral every 8 hours  insulin glargine Injectable (LANTUS) 12 Unit(s) SubCutaneous at bedtime  insulin lispro (ADMELOG) corrective regimen sliding scale   SubCutaneous three times a day before meals  insulin lispro (ADMELOG) corrective regimen sliding scale   SubCutaneous at bedtime  insulin lispro Injectable (ADMELOG) 3 Unit(s) SubCutaneous three times a day with meals  metoprolol succinate ER 25 milliGRAM(s) Oral daily  Nephro-rober 1 Tablet(s) Oral daily  pantoprazole  Injectable 40 milliGRAM(s) IV Push daily  polyethylene glycol 3350 17 Gram(s) Oral daily  senna 2 Tablet(s) Oral at bedtime    MEDICATIONS  (PRN):  acetaminophen     Tablet .. 650 milliGRAM(s) Oral every 6 hours PRN Mild Pain (1 - 3)  dextrose Oral Gel 15 Gram(s) Oral once PRN Blood Glucose LESS THAN 70 milliGRAM(s)/deciliter  HYDROmorphone  Injectable 1 milliGRAM(s) IV Push every 6 hours PRN Severe Pain (7 - 10)  melatonin 3 milliGRAM(s) Oral at bedtime PRN Insomnia  ondansetron Injectable 4 milliGRAM(s) IV Push every 6 hours PRN Nausea and/or Vomiting  sodium chloride 0.65% Nasal 1 Spray(s) Both Nostrils three times a day PRN Dryness  sodium chloride 0.9% lock flush 10 milliLiter(s) IV Push every 1 hour PRN Pre/post blood products, medications, blood draw, and to maintain line patency          Vital Signs Last 24 Hrs  T(C): 36.7 (11 Mar 2025 10:54), Max: 36.9 (10 Mar 2025 14:56)  T(F): 98 (11 Mar 2025 10:54), Max: 98.4 (10 Mar 2025 14:56)  HR: 98 (11 Mar 2025 10:54) (60 - 103)  BP: 93/54 (11 Mar 2025 10:54) (86/51 - 125/70)  BP(mean): 73 (11 Mar 2025 10:05) (60 - 76)  RR: 18 (11 Mar 2025 10:54) (17 - 25)  SpO2: 93% (11 Mar 2025 10:54) (89% - 100%)    Parameters below as of 11 Mar 2025 10:54  Patient On (Oxygen Delivery Method): room air          VBG pH 7.39 03-11 @ 04:57    VBG pCO2 45 03-11 @ 04:57    VBG O2 sat 87.9 03-11 @ 04:57    VBG lactate 1.6 03-11 @ 04:57  VBG pH 7.34 03-10 @ 05:54    VBG pCO2 45 03-10 @ 05:54    VBG O2 sat 74.1 03-10 @ 05:54    VBG lactate 1.6 03-10 @ 05:54      03-10 @ 07:01  -  03-11 @ 07:00  --------------------------------------------------------  IN: 480 mL / OUT: 1500 mL / NET: -1020 mL          LABS:                        9.6    9.22  )-----------( 211      ( 11 Mar 2025 04:52 )             27.7     03-11    133[L]  |  96  |  26[H]  ----------------------------<  180[H]  3.9   |  22  |  3.08[H]    Ca    8.6      11 Mar 2025 04:52  Phos  3.3     03-11  Mg     1.9     03-11            CAPILLARY BLOOD GLUCOSE      POCT Blood Glucose.: 189 mg/dL (11 Mar 2025 04:37)    PT/INR - ( 11 Mar 2025 04:52 )   PT: 14.4 sec;   INR: 1.27 ratio         PTT - ( 11 Mar 2025 04:52 )  PTT:29.6 sec  Urinalysis Basic - ( 11 Mar 2025 04:52 )    Color: x / Appearance: x / SG: x / pH: x  Gluc: 180 mg/dL / Ketone: x  / Bili: x / Urobili: x   Blood: x / Protein: x / Nitrite: x   Leuk Esterase: x / RBC: x / WBC x   Sq Epi: x / Non Sq Epi: x / Bacteria: x      Physical Examination:  PULM: Decreased BS at bases   CVS: S1, S2 heard    RADIOLOGY REVIEWED  < from: CT Angio Chest PE Protocol w/ IV Cont (03.07.25 @ 11:32) >    ACC: [id number] EXAM:  CT ANGIO CHEST PULM ART WAWIC   ORDERED BY: JORGE ALBERTO NEWMAN     PROCEDURE DATE:  03/07/2025          INTERPRETATION:  CLINICAL INFORMATION: Shortness of breath, evaluate for   pulmonary embolism.    COMPARISON: CT chest 1/25/2025    CONTRAST/COMPLICATIONS:  IV Contrast: Omnipaque 350  70 cc administered   30 cc discarded  Oral Contrast: NONE  .    PROCEDURE:  CT Angiography of the Chest.  Sagittal and coronal reformats were performed as well as 3D (MIP)   reconstructions.    FINDINGS:    LUNGS AND LARGE AIRWAYS: Minimal secretions within the left main   bronchus. Peripheral predominant groundglass opacities in all lobes.   Partial atelectasis of the bilateral lower lobes.  PLEURA: Small left and moderate right pleuraleffusions.  VESSELS: No pulmonary embolus is noted. Aortic calcifications. Coronary   artery calcifications. Right IJ central venous catheter with tip   terminating in the distal SVC.  HEART: Cardiomegaly. Trace pericardial effusion.  MEDIASTINUM ANDHILA: No lymphadenopathy.  CHEST WALL AND LOWER NECK: Within normal limits.  VISUALIZED UPPER ABDOMEN: Vascular calcifications.  BONES: Within normal limits.    IMPRESSION:  *  No pulmonary embolism.  *  Moderate right and small left pleural effusions.  *  Nonspecific peripheral groundglass opacities in the bilateral lungs,   slightly more prominent when compared to prior examination on 1/25/2025.        --- End of Report ---      < end of copied text >

## 2025-03-11 NOTE — PROGRESS NOTE ADULT - ASSESSMENT
65 YO F with PMHx of HFrEF 30-35 and grade 2 ddfxn (last TTE on 01/16/25), DM2, and diabetic foot ulcer. Recent admission at [facility] for ADHF. Patient now represents to OSH and ultimately to [facility] as a transfer for worsening right leg pain and fluid secretion. As per documentation, patient was reported to have severe depletion of RLE blood flow beyond the popliteal vessel at knee and similar findings in the left. Patient was transferred to [facility] for CO2 angiogram with Dr. Baltazar. Of note, patient also with reported visual disturbance for which patient was seen and evaluated by opthalmology. Internal Medicine has been consulted on Ms. Kirby's care for medical management.     # ADHF/ BiV HFrEF 20   - Recent admission at [facility] for ADHF and on dobutamine outpatient  - TTE 1/16 with EF 30-35 with moderately reduced LVSF and grade 2 ddfxn, normal RVSF , trace TR/ OH, and trace pericardial effusion  - RPT TTE with EF 20, severely decreased LV, decreased RVSF with TAPSE 1.2, and regional wall motion abnormalities present with entire septum, entire apex, entire inferior wall, mid inferolateral segment, and mid anterolateral segment are hypokinetic.   - RHC with RA 20, PA 49/29 (40), PCWP 35, mVO2 51.1, CO/CI 3.8/2.2, SVR 1095 w/ Multivessel CAD   - s/p zaroxyln 10 QD to augment diuresis   - s/p bumex GTT   - s/p HTS to augment diuresis   - RPT limited TTE w/ LVSF severely decreased, reduced RVSF, LVOT VTI 12, mild to mod TR, and mildly elevated right atrial pressure  - Case discussed with EP and needs to be on GDMT for 3 months before AICD placement and should be stabilized off of inotropic support.   - Continue on bumex 4 IV QD but anuric and will DC  - S/P Milrinone gtt as per Cardio   - Pending RPT ECHO post cath  - HF and cards following and adjusting medications --> Started on Toprol XL 25 PO Qd and Hydralazine 10 Q8 on non HD Days per HF   - Monitor I and O, diuresis per Cardio/ Renal    - Monitor volume status   - Check daily weights    - Monitor on telemetry; Monitor electrolytes   - Cardio, HF, Renal, and EP evals appreciated; F/u recs     # Tachycardia and Hypoxia  - Tachycardiac and on RA with SPO2 running 90-92   - Could be second to low cardiac output   - CTA with no PE  - Duplex and TTE pending   - On Toprol XL 25 PO Qd. Monitor HR   - Monitor closely on Tele  - Cardio eval appreciated; F/u recs    # JAMEL with Hyponatremia and Metabolic Acidosis   - Baseline CRE 0.7-1.2 and increased to ~4  - As per OSH documentation, JAMEL was thought to be second to Unasyn/ Bumex / Entresto use and Hypotension   - US RENAL with no hydronephrosis  - Likely cardiorenal induced with low flow state in ADHF, however now rising again and concern for milrinone vs overdiuresis (prerenal) vs cardiorenal.   - Milrinone adjusted and diuresis turned off, however no improvement/ worsened in renal function noted.   - s/p shiley placement and started on HD 2/20  - Last HD on 3/3, attempted to monitor off, however BUN/ CRE rapidly increasing.   - Per discussion with renal will likely need long term HD with long term access - IR consulted for shiley to permacath conversion  - HF and renal following   - Avoid nephrotoxic agents  - Monitor Cr and daily BMP     # CAD with TVD   - NST abnormal with small-sized, moderate defect in apical wall that is predominantly fixed suggestive of an infarction with minimal marcus-infarct ischemia. Post stress LVEF 25.  - s/p LHC with RCA , severe ostial LM disease, diffuse disease in LAD and LCx, some L to R collaterals (Multivessel CAD)  - Case discussed with CTSx and NOT a candidate for CABG due to comorbidities  - Cardiac MRI with greater than 75% subendocardial scarring of the basal to mid-inferior wall of the LV and 50% scarring of the left ventricular basal inferoseptal wall. There is also left ventricular transmural scarring involving the apical septal wall and likely near transmural scarring of the apical lateral wall.  - s/p RPT LHC 3/4 with LM 80 s/p POBA/ VERA with LM 1, pLAD 90 s/p POBA/ VERA with pLAD 1, and Cx 80 s/p POBA with Cx 10.   - Continue on DAPT and Statin   - IC, Cards and HF following     # BL LE Edema likely in setting of ADHF  - Remove volume as per Cardio, HF and Renal   - BL LE elevation and compression  - LE Duplex neg   - Monitor for now    # Pericardial effusion likely in setting ADHF  - TTE with trace pericardial effusion   - CT Chest with small pericardial effusion   - Denies chest pain, palpitations, chest tightness or discomfort, shortness of breath or dyspnea   - Repeat TTE in 1 month for further evaluation   - Diuresis per cardio/ renal.  - Monitor on telemetry  - Cardio following    # Pleural effusion likely in setting ADHF  - CT Chest with small BL pleural effusions  - Monitor O2 saturation  - Supplement to maintain > 90%   - Diuresis / HD per cardio/ renal.     # Hypernatremia to hyponatremia  - Hypernatremia likely from HTS vs over diuresis/ intravascular dehydration. HTS and diuresis stopped with improved sodium   - Hyponatremia now noted second to volume overload   - Avoid overcorrection > 6-8 mEq in 24 hours  - Monitor sodium with HD    # Transaminitis  - Likely 2/2 hepatic congestion   - Volume removal with HD as tolerated  - Trend LFTs, if uptrend post-HD initiation, check ABD US  - Serial ABD Examinations      # Leukocytosis likely in setting of BL LE ulcers with pop occlusion   - BCx negative   - UCx with probable contamination   - S/P unasyn for ABX.   - Leukocytosis fluctuating, however appears nontoxic with no fever spikes and monitoring closely off ABX  - If febrile check pan cultures   - Trend CBC, temp curve, VS and adjust as tolerated    # Foot ulcers with worsening RLE pain second to pop artery occlusion +/- diabetic ulcers   - RLE Arterial Duplex with popliteal artery occlusion   - LLE Arterial Duplex with underlying disease cannot be excluded   - s/p angio with pop and AT VERA on 2/24   - Continue on Lipitor and DAPT  - Continue pain control with oxy  - Wound care called for dressing recs   - Vascular following,     # Visual Disturbance  - CT Head w/ old infarcts  - On ASA and Statin   - Opthalmology eval appreciated    # Indigestion and Constipation   - PPI, miralax and senna continued  - Monitor BMs    # Anemia likely mixed AOCD vs iron deficiency   - HH stable in 9s on HSQ  - Anemia panel with AOCD   - Started on venofer, but ferritin high and now stopped   - Continue on EPO with HD per renal  - Monitor HH   - Transfuse for Hgb < 8  - Maintain active T/S    # Diabetes Mellitus A1C 11.6   - Continue on lantus 13 with lispro 5 and ISS   - Diabetic DASH diet   - Monitor and adjust glucose levels PRN   - Endocrine following; F/u recs     # HLD and HLD  - Continue on lipitor and toprol  - Monitor BP    # DISPO TBD  - Palliative care called and patient remains FULL CODE

## 2025-03-11 NOTE — PROGRESS NOTE ADULT - PROBLEM SELECTOR PLAN 1
Inpatient Plan:  - Check BG TID AC and HS while on PO diet  - C/w Lantus 12u QHS   - Adjust Admelog to 3u TID AC (HOLD if NPO)  - C/w low dose Admelog correctional scales TID AC and HS    Discharge Planning:   - Likely basal/bolus regimen given kidney function (doses TBD closer to d/c). Not a candidate for SGLT2 due to leg ulcers and also significantly decreased EGFR., can consider GLP1 in addition to Basal/bolus> will need close f/u with Optho due to h/o retinopathy.   Can send Rx for ozempic 0.25mg weekly x 4weeks and increase to 0.50mg, or mounjaro 2.5mg 2.5mg x4 weeks, then increase to 5.0mg weekly. (Patient denies medullary thyroid cancer, hx of pancreatitis or MEN2)  - Please make sure patient has DM management supplies (glucometer, lancets, strips, alcohol pads, insulin pen needles).  - Patient should check BGs before meals and at bedtime. Contact PCP/endocrinology if BG is less than 70 X1, greater than  400 X1 or persistently greater than 200s.   - Endocrine follow up: can f/u with Dr. Grace, Endocrinology.   - Needs Optho/ renal/cardiac /podiatry and vascular follow up

## 2025-03-11 NOTE — PROGRESS NOTE ADULT - PROBLEM SELECTOR PLAN 4
Ask pharmacist what is covered best either omeprazole, Prevacid, pantoprazole, or Nexium.   She needs in a PPI not just an H2 blocker because she has an upper GI bleed -S/p LHC with multivessel disease  -S/p LM/LAD PCI, VERA x 1 to LAD, VERA to LM, POBA to CX on 3/4  -Cardiology f/u.

## 2025-03-11 NOTE — PROGRESS NOTE ADULT - SUBJECTIVE AND OBJECTIVE BOX
[facility] NEPHROLOGY- PROGRESS NOTE    64y Female with history of CHF presents as a transfer for LE CO2 angiogram. Nephrology consulted for elevated Scr.      REVIEW OF SYSTEMS:  Gen: no fevers  Cards: no chest pain  Resp: no dyspnea  GI: no nausea or vomiting or diarrhea  Vascular: no LE edema    Augmentin (Stomach Upset; Vomiting; Nausea)  No Known Allergies      Hospital Medications: Medications reviewed        VITALS:  T(F): 98 (03-11-25 @ 10:54), Max: 98.4 (03-10-25 @ 20:45)  HR: 78 (03-11-25 @ 14:00)  BP: 93/60 (03-11-25 @ 14:00)  RR: 18 (03-11-25 @ 10:54)  SpO2: 93% (03-11-25 @ 10:54)  Wt(kg): --    03-10 @ 07:01  -  03-11 @ 07:00  --------------------------------------------------------  IN: 480 mL / OUT: 1500 mL / NET: -1020 mL      Height (cm): 162.6 (03-11 @ 08:16)  Weight (kg): 72.1 (03-11 @ 08:16)  BMI (kg/m2): 27.3 (03-11 @ 08:16)  BSA (m2): 1.77 (03-11 @ 08:16)        PHYSICAL EXAM:  Gen: NAD, calm  Cards: RRR, +S1/S2, no M/G/R  Resp: bibasilar rales  GI: soft, NT/ND, NABS  : no stiles  Vascular: + LE wrapped B/L  + RIJ TDC intact        LABS:  03-11    133[L]  |  96  |  26[H]  ----------------------------<  180[H]  3.9   |  22  |  3.08[H]    Ca    8.6      11 Mar 2025 04:52  Phos  3.3     03-11  Mg     1.9     03-11      Creatinine Trend: 3.08 <--, 4.40 <--, 3.84 <--, 3.05 <--, 4.46 <--, 3.63 <--, 2.35 <--                        9.6    9.22  )-----------( 211      ( 11 Mar 2025 04:52 )             27.7     Urine Studies:  Urinalysis Basic - ( 11 Mar 2025 04:52 )    Color:  / Appearance:  / SG:  / pH:   Gluc: 180 mg/dL / Ketone:   / Bili:  / Urobili:    Blood:  / Protein:  / Nitrite:    Leuk Esterase:  / RBC:  / WBC    Sq Epi:  / Non Sq Epi:  / Bacteria:

## 2025-03-11 NOTE — PROGRESS NOTE ADULT - SUBJECTIVE AND OBJECTIVE BOX
Patient seen today for follow up inpatient Diabetes Mellitus management.    Chief Complaint: Type 2 Diabetes Mellitus     INTERVAL HX:  Patient seen in [facility] 2DSU 241 W1. Patient is alert and oriented, resting in bed. S/p PermCath placement this am. Patient reports feeling good, eating full meals and tolerating POs. She states she ate breakfast when she got back from the procedure. Noted FBG slightly elevated this am to 189mg/dL -> Lantus held last night by primary team. No hypoglycemia. Noted low BG at bedtime to 87mg/dL. Blood glucose levels in the last 24hrs have been 87-257mg/dL.     Review of Systems:  General: As above.  Respiratory: Denies any SOB, GARG, or cough.  Gastrointestinal: Denies any n/v/d or abdominal pain.   Endocrine: Denies any polyuria, polydipsia, polyphagia, visual changes, or numbness in feet.     Allergies  Augmentin (Stomach Upset; Vomiting; Nausea)  No Known Allergies      Intolerances  None.       MEDICATIONS  (STANDING):  acetaminophen     Tablet .. 650 milliGRAM(s) Oral every 6 hours PRN  aspirin enteric coated 81 milliGRAM(s) Oral daily  atorvastatin 40 milliGRAM(s) Oral at bedtime  benzocaine/menthol Lozenge 1 Lozenge Oral once  bisacodyl 5 milliGRAM(s) Oral every 12 hours  chlorhexidine 4% Liquid 1 Application(s) Topical <User Schedule>  clopidogrel Tablet 75 milliGRAM(s) Oral daily  clopidogrel Tablet      dextrose 5%. 1000 milliLiter(s) IV Continuous <Continuous>  dextrose 5%. 1000 milliLiter(s) IV Continuous <Continuous>  dextrose 50% Injectable 25 Gram(s) IV Push once  dextrose 50% Injectable 12.5 Gram(s) IV Push once  dextrose 50% Injectable 25 Gram(s) IV Push once  dextrose Oral Gel 15 Gram(s) Oral once PRN  glucagon  Injectable 1 milliGRAM(s) IntraMuscular once  hydrALAZINE 10 milliGRAM(s) Oral every 8 hours  HYDROmorphone  Injectable 1 milliGRAM(s) IV Push every 6 hours PRN  insulin glargine Injectable (LANTUS) 12 Unit(s) SubCutaneous at bedtime  insulin lispro (ADMELOG) corrective regimen sliding scale   SubCutaneous three times a day before meals  insulin lispro (ADMELOG) corrective regimen sliding scale   SubCutaneous at bedtime  insulin lispro Injectable (ADMELOG) 3 Unit(s) SubCutaneous three times a day with meals  melatonin 3 milliGRAM(s) Oral at bedtime PRN  metoprolol succinate ER 25 milliGRAM(s) Oral daily  Nephro-rober 1 Tablet(s) Oral daily  ondansetron Injectable 4 milliGRAM(s) IV Push every 6 hours PRN  pantoprazole  Injectable 40 milliGRAM(s) IV Push daily  polyethylene glycol 3350 17 Gram(s) Oral daily  senna 2 Tablet(s) Oral at bedtime  sodium chloride 0.65% Nasal 1 Spray(s) Both Nostrils three times a day PRN  sodium chloride 0.9% lock flush 10 milliLiter(s) IV Push every 1 hour PRN      atorvastatin 40 milliGRAM(s) Oral at bedtime  dextrose 50% Injectable 25 Gram(s) IV Push once  dextrose 50% Injectable 12.5 Gram(s) IV Push once  dextrose 50% Injectable 25 Gram(s) IV Push once  dextrose Oral Gel 15 Gram(s) Oral once PRN  glucagon  Injectable 1 milliGRAM(s) IntraMuscular once  insulin glargine Injectable (LANTUS) 12 Unit(s) SubCutaneous at bedtime  insulin lispro (ADMELOG) corrective regimen sliding scale   SubCutaneous three times a day before meals  insulin lispro (ADMELOG) corrective regimen sliding scale   SubCutaneous at bedtime  insulin lispro Injectable (ADMELOG) 3 Unit(s) SubCutaneous three times a day with meals      insulin lispro (ADMELOG) corrective regimen sliding scale   SubCutaneous three times a day before meals  insulin lispro (ADMELOG) corrective regimen sliding scale   SubCutaneous at bedtime  insulin lispro Injectable (ADMELOG) 3 Unit(s) SubCutaneous three times a day with meals      PHYSICAL EXAM:  VITALS:   T(C): 36.7 (03-11-25 @ 10:54), Max: 36.9 (03-10-25 @ 14:56)  HR: 98 (03-11-25 @ 10:54) (60 - 103)  BP: 93/54 (03-11-25 @ 10:54) (86/51 - 125/70)  RR: 18 (03-11-25 @ 10:54) (17 - 25)  SpO2: 93% (03-11-25 @ 10:54) (89% - 100%)    GENERAL: In no acute distress  Respiratory: Respirations unlabored  Extremities: Warm and dry, no edema  NEURO: Alert and oriented, appropriate     LABS:  POCT Blood Glucose.: 257 mg/dL (03-11-25 @ 11:59)  POCT Blood Glucose.: 189 mg/dL (03-11-25 @ 04:37)  POCT Blood Glucose.: 102 mg/dL (03-10-25 @ 22:13)  POCT Blood Glucose.: 87 mg/dL (03-10-25 @ 21:48)  POCT Blood Glucose.: 88 mg/dL (03-10-25 @ 21:46)  POCT Blood Glucose.: 217 mg/dL (03-10-25 @ 17:22)  POCT Blood Glucose.: 173 mg/dL (03-10-25 @ 17:10)  POCT Blood Glucose.: 264 mg/dL (03-10-25 @ 11:42)  POCT Blood Glucose.: 198 mg/dL (03-10-25 @ 08:05)  POCT Blood Glucose.: 134 mg/dL (03-09-25 @ 21:29)  POCT Blood Glucose.: 87 mg/dL (03-09-25 @ 17:04)  POCT Blood Glucose.: 107 mg/dL (03-09-25 @ 12:22)  POCT Blood Glucose.: 141 mg/dL (03-09-25 @ 08:22)  POCT Blood Glucose.: 121 mg/dL (03-08-25 @ 21:14)  POCT Blood Glucose.: 89 mg/dL (03-08-25 @ 18:27)                          9.6    9.22  )-----------( 211      ( 11 Mar 2025 04:52 )             27.7     03-11    133[L]  |  96  |  26[H]  ----------------------------<  180[H]  3.9   |  22  |  3.08[H]    Ca    8.6      11 Mar 2025 04:52  Phos  3.3     03-11  Mg     1.9     03-11        PT/INR - ( 11 Mar 2025 04:52 )   PT: 14.4 sec;   INR: 1.27 ratio         PTT - ( 11 Mar 2025 04:52 )  PTT:29.6 sec      Urinalysis Basic - ( 11 Mar 2025 04:52 )    Color: x / Appearance: x / SG: x / pH: x  Gluc: 180 mg/dL / Ketone: x  / Bili: x / Urobili: x   Blood: x / Protein: x / Nitrite: x   Leuk Esterase: x / RBC: x / WBC x   Sq Epi: x / Non Sq Epi: x / Bacteria: x        A1C with Estimated Average Glucose Result: A1C with Estimated Average Glucose Result: 11.6 % (01-24-25 @ 05:40)  A1C with Estimated Average Glucose Result: >15.5 % (12-13-24 @ 06:49)

## 2025-03-11 NOTE — PROGRESS NOTE ADULT - ASSESSMENT
63 y/o F with PMH of CHF (last TTE 1/2025 with EF 30-35%), DM, diabetic foot ulcer, PAD, CAD. Recent admission at [facility] for CHF exacerbation, presented to [facility] as a transfer for worsening R leg pain. Hospital course c/b acute on chronic CHF - TTE with EF 20%, severely decreased LV and RV function, multiple regional motion abnormalities, s/p LHC revealing TVD, pleural/pericardial effusions, JAMEL, leukocytosis suspected to be in the setting of LE wound on IV ABX, persistent RLE pain w/ RLE Arterial Duplex revealing popliteal artery occlusion  Plan for eventual angiogram with vascular when medically optimized. Pulmonary called to consult as pt with c/o SOB.

## 2025-03-11 NOTE — PROGRESS NOTE ADULT - ASSESSMENT
64F, hx of CHF (last TTE on 1/16/25 EF 30-35%, G2DD), DM, diabetic foot ulcer with a recent admission at [facility] for CHF exacerbation 1/14-1/17 p/w worsening R. leg pain and fluid secretion, was meeting sepsis criteria with podiatry having low suspicion for right cellulitis and admitted for continued management of HF exacerbation and needing ischemic eval.     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  -Left leg Arterial Duplex: Slightly tardus parvus waveform of the left popliteal artery is noted, for which underlying disease cannot be excluded. Posterior tibial artery waveform is nonpulsatile. Anterior tibial artery tardus parvus flow is noted.   Transferred to [facility] for CO2 angiogram as per vascular surgery    #  PAD Wound of lower extremity.   ·  Plan: Podiatry following, b/l serous bullae, no concerns for infection  Found to have RLE coolness  -Right Leg Arterial Duplex: Popliteal artery is occluded with negligible flow of right trifurcation arteries.  -Left leg Arterial Duplex: Slightly tardus parvus waveform of the left popliteal artery is noted, for which underlying disease cannot be excluded. Posterior tibial artery waveform is nonpulsatile. Anterior tibial artery tardus parvus flow is noted.  -Started on heparin gtt and dobutamine gtt -Will transfer to [facility] for CO2 angiogram as per vascular surgery as not candidate for CTA due to worsening SCr  -Keep compression dressing  -LE elevation above heart level at rest.  -Transferred to [facility] for CO2 angiogram   - Overall this patient is at   intermediate  risk (for cardiac death, nonfatal myocardial infarction, and nonfatal cardiac arrest perioperatively for this intermediate  risk procedure).   No cardiac contraindications for CO2 vangiogram  There  are  no further recommendation for risk stratifying imaging/stress testing prior to planned surgery  Seen by Podiatry-No acute pod intervention, local wound care only-   PAD with rest ischemia  s/p AT/Popliteal angioplasty on DAPT        # HFrEF (congestive heart failure). Ischemic Cardiomyopathy  ·  Plan: Hx of HF, previous admission in January, on home Lasix 40 qD, Metoprolol Succ 25 qD. Not on Entresto due to insurance issues  Last TTE: LVSF moderately decreased w/ EF 30-35 %. Moderate G2DD. Mild MR  Stress test: small-sized, moderate defect(s) in the apical wall that is predominantly fixed suggestive of an infarction with minimal marcus-infarct ischemia  Ischemic cardiomyopathy  GDMT on hold 2/2 JAMEL  Repeat TTE EF 20% with WMA RAP~~8  Repeat Limited TTE  Taper off Milrinone and start GDMT  Is currently off Hydral/Isdn and Bumex 2/2 soft BP  Continue HD with UF had 2900mL removed yesterday will stop Milrinone and observe  Started  low dose BBlocker Metoprolol tartate 12.5 mg PO q8  03/10-Off Midodrine will reattempt Hyd/Isdn  03.11-Daily HD/UF Weight down        # CAD  LHC-RCA , severe ostial LM disease, diffuse disease in LAD and LCx, some L to R collaterals-seen by CTS advise cMR for viability poor target vessels for CABG-?High risk LM/LAD PCI  Had cMR-LVEF is 25%, greater than 75% subendocardial scarring involving the basal to mid inferior wall of the left ventricle, left ventricular transmural scarring involving the apical septal wall and likely near transmural scarring of the apical lateral wall. 50% scarring of the left ventricular basal inferoseptal wall.  03/05-s/p PCI-LM/LAD   Continue DAPT        # JAMEL on CKD   Creatinine Trend: 3.08<-- 4.40 <--3.84<--, 3.05<--, 4.46<--, 3.63<--, 2.35<--, 2.36<---3.38<--(HD)-- 4.76<--4.07<---3.90<-- 1.17  Has had 3 sessions HD for interrmittent HD to allow contrast based procedures is non oliguric  Plan to continue HD likely will need extended RRT  Srini change on 03/11      PLAN FOR REHAB  [facility]

## 2025-03-11 NOTE — PRE-ANESTHESIA EVALUATION ADULT - NSANTHPMHFT_GEN_ALL_CORE
64F, hx of CHF (last TTE on 1/16/25 EF 30-35%, G2DD), DM, diabetic foot ulcer with a recent admission at [facility] for CHF exacerbation presented as a transfer for worsening R. leg pain and fluid secretion

## 2025-03-11 NOTE — PRE PROCEDURE NOTE - PRE PROCEDURE EVALUATION
Interventional Radiology Pre-Procedure Note    Patient is a 64y old Female with renal failure here for tunneled dialysis catheter placement.     PAST MEDICAL & SURGICAL HISTORY:  DM (diabetes mellitus)      Diabetic foot ulcer      Congestive heart failure (CHF)      CAD (coronary artery disease)      Cataract of both eyes, unspecified cataract type      History of cholecystectomy           Allergies: Augmentin (Stomach Upset; Vomiting; Nausea)  No Known Allergies      LABS:                        9.6    9.22  )-----------( 211      ( 11 Mar 2025 04:52 )             27.7     03-11    133[L]  |  96  |  26[H]  ----------------------------<  180[H]  3.9   |  22  |  3.08[H]    Ca    8.6      11 Mar 2025 04:52  Phos  3.3     03-11  Mg     1.9     03-11      PT/INR - ( 11 Mar 2025 04:52 )   PT: 14.4 sec;   INR: 1.27 ratio         PTT - ( 11 Mar 2025 04:52 )  PTT:29.6 sec    T(C): 36.7 (03-11-25 @ 08:16), Max: 36.9 (03-10-25 @ 14:56)  HR: 103 (03-11-25 @ 08:16) (60 - 103)  BP: 101/62 (03-11-25 @ 08:16) (86/51 - 125/70)  RR: 18 (03-11-25 @ 08:16) (17 - 18)  SpO2: 94% (03-11-25 @ 08:16) (92% - 95%)    Procedure/ risks/ benefits were explained, informed consent obtained from patient, verbalizes understanding.

## 2025-03-12 LAB
ANION GAP SERPL CALC-SCNC: 18 MMOL/L — HIGH (ref 5–17)
BUN SERPL-MCNC: 37 MG/DL — HIGH (ref 7–23)
CALCIUM SERPL-MCNC: 8.6 MG/DL — SIGNIFICANT CHANGE UP (ref 8.4–10.5)
CHLORIDE SERPL-SCNC: 92 MMOL/L — LOW (ref 96–108)
CO2 SERPL-SCNC: 20 MMOL/L — LOW (ref 22–31)
CREAT SERPL-MCNC: 3.61 MG/DL — HIGH (ref 0.5–1.3)
EGFR: 14 ML/MIN/1.73M2 — LOW
EGFR: 14 ML/MIN/1.73M2 — LOW
GLUCOSE BLDC GLUCOMTR-MCNC: 146 MG/DL — HIGH (ref 70–99)
GLUCOSE BLDC GLUCOMTR-MCNC: 160 MG/DL — HIGH (ref 70–99)
GLUCOSE BLDC GLUCOMTR-MCNC: 296 MG/DL — HIGH (ref 70–99)
GLUCOSE BLDC GLUCOMTR-MCNC: 340 MG/DL — HIGH (ref 70–99)
GLUCOSE SERPL-MCNC: 248 MG/DL — HIGH (ref 70–99)
HCT VFR BLD CALC: 28.3 % — LOW (ref 34.5–45)
HGB BLD-MCNC: 9.6 G/DL — LOW (ref 11.5–15.5)
MAGNESIUM SERPL-MCNC: 2 MG/DL — SIGNIFICANT CHANGE UP (ref 1.6–2.6)
MCHC RBC-ENTMCNC: 25.3 PG — LOW (ref 27–34)
MCHC RBC-ENTMCNC: 33.9 G/DL — SIGNIFICANT CHANGE UP (ref 32–36)
MCV RBC AUTO: 74.5 FL — LOW (ref 80–100)
NRBC BLD AUTO-RTO: 0 /100 WBCS — SIGNIFICANT CHANGE UP (ref 0–0)
PHOSPHATE SERPL-MCNC: 3.3 MG/DL — SIGNIFICANT CHANGE UP (ref 2.5–4.5)
PLATELET # BLD AUTO: 225 K/UL — SIGNIFICANT CHANGE UP (ref 150–400)
POTASSIUM SERPL-MCNC: 4.4 MMOL/L — SIGNIFICANT CHANGE UP (ref 3.5–5.3)
POTASSIUM SERPL-SCNC: 4.4 MMOL/L — SIGNIFICANT CHANGE UP (ref 3.5–5.3)
RBC # BLD: 3.8 M/UL — SIGNIFICANT CHANGE UP (ref 3.8–5.2)
RBC # FLD: 22.3 % — HIGH (ref 10.3–14.5)
SODIUM SERPL-SCNC: 130 MMOL/L — LOW (ref 135–145)
WBC # BLD: 10.22 K/UL — SIGNIFICANT CHANGE UP (ref 3.8–10.5)
WBC # FLD AUTO: 10.22 K/UL — SIGNIFICANT CHANGE UP (ref 3.8–10.5)

## 2025-03-12 RX ORDER — INSULIN GLARGINE-YFGN 100 [IU]/ML
14 INJECTION, SOLUTION SUBCUTANEOUS AT BEDTIME
Refills: 0 | Status: DISCONTINUED | OUTPATIENT
Start: 2025-03-12 | End: 2025-03-13

## 2025-03-12 RX ORDER — INSULIN LISPRO 100 U/ML
5 INJECTION, SOLUTION INTRAVENOUS; SUBCUTANEOUS
Refills: 0 | Status: DISCONTINUED | OUTPATIENT
Start: 2025-03-12 | End: 2025-03-12

## 2025-03-12 RX ORDER — HYDROMORPHONE/SOD CHLOR,ISO/PF 2 MG/10 ML
0.5 SYRINGE (ML) INJECTION ONCE
Refills: 0 | Status: DISCONTINUED | OUTPATIENT
Start: 2025-03-12 | End: 2025-03-12

## 2025-03-12 RX ORDER — HYDROMORPHONE/SOD CHLOR,ISO/PF 2 MG/10 ML
0.5 SYRINGE (ML) INJECTION ONCE
Refills: 0 | Status: COMPLETED | OUTPATIENT
Start: 2025-03-12 | End: 2025-03-12

## 2025-03-12 RX ORDER — INSULIN LISPRO 100 U/ML
6 INJECTION, SOLUTION INTRAVENOUS; SUBCUTANEOUS
Refills: 0 | Status: DISCONTINUED | OUTPATIENT
Start: 2025-03-12 | End: 2025-03-17

## 2025-03-12 RX ORDER — EPOETIN ALFA 10000 [IU]/ML
8000 SOLUTION INTRAVENOUS; SUBCUTANEOUS ONCE
Refills: 0 | Status: COMPLETED | OUTPATIENT
Start: 2025-03-12 | End: 2025-03-12

## 2025-03-12 RX ADMIN — INSULIN LISPRO 5 UNIT(S): 100 INJECTION, SOLUTION INTRAVENOUS; SUBCUTANEOUS at 12:19

## 2025-03-12 RX ADMIN — Medication 650 MILLIGRAM(S): at 05:10

## 2025-03-12 RX ADMIN — INSULIN GLARGINE-YFGN 14 UNIT(S): 100 INJECTION, SOLUTION SUBCUTANEOUS at 21:02

## 2025-03-12 RX ADMIN — INSULIN LISPRO 3: 100 INJECTION, SOLUTION INTRAVENOUS; SUBCUTANEOUS at 08:03

## 2025-03-12 RX ADMIN — INSULIN LISPRO 3 UNIT(S): 100 INJECTION, SOLUTION INTRAVENOUS; SUBCUTANEOUS at 08:04

## 2025-03-12 RX ADMIN — Medication 81 MILLIGRAM(S): at 12:22

## 2025-03-12 RX ADMIN — Medication 1 MILLIGRAM(S): at 13:30

## 2025-03-12 RX ADMIN — Medication 1 MILLIGRAM(S): at 05:09

## 2025-03-12 RX ADMIN — CLOPIDOGREL BISULFATE 75 MILLIGRAM(S): 75 TABLET, FILM COATED ORAL at 12:11

## 2025-03-12 RX ADMIN — Medication 10 MILLIGRAM(S): at 05:11

## 2025-03-12 RX ADMIN — INSULIN LISPRO 1: 100 INJECTION, SOLUTION INTRAVENOUS; SUBCUTANEOUS at 17:43

## 2025-03-12 RX ADMIN — ATORVASTATIN CALCIUM 40 MILLIGRAM(S): 80 TABLET, FILM COATED ORAL at 21:03

## 2025-03-12 RX ADMIN — INSULIN LISPRO 4: 100 INJECTION, SOLUTION INTRAVENOUS; SUBCUTANEOUS at 12:18

## 2025-03-12 RX ADMIN — Medication 1 TABLET(S): at 12:11

## 2025-03-12 RX ADMIN — Medication 0.5 MILLIGRAM(S): at 14:43

## 2025-03-12 RX ADMIN — Medication 650 MILLIGRAM(S): at 00:12

## 2025-03-12 RX ADMIN — INSULIN LISPRO 6 UNIT(S): 100 INJECTION, SOLUTION INTRAVENOUS; SUBCUTANEOUS at 17:43

## 2025-03-12 RX ADMIN — Medication 1 MILLIGRAM(S): at 12:11

## 2025-03-12 RX ADMIN — Medication 0.5 MILLIGRAM(S): at 13:43

## 2025-03-12 RX ADMIN — EPOETIN ALFA 8000 UNIT(S): 10000 SOLUTION INTRAVENOUS; SUBCUTANEOUS at 15:44

## 2025-03-12 RX ADMIN — Medication 1 MILLIGRAM(S): at 18:54

## 2025-03-12 RX ADMIN — MIDODRINE HYDROCHLORIDE 10 MILLIGRAM(S): 5 TABLET ORAL at 13:30

## 2025-03-12 RX ADMIN — Medication 40 MILLIGRAM(S): at 12:25

## 2025-03-12 NOTE — PROGRESS NOTE ADULT - ASSESSMENT
65 y/o F with PMH of CHF (last TTE 1/2025 with EF 30-35%), DM, diabetic foot ulcer, PAD, CAD[facility], presented to [facility] as a transfer for worsening R leg pain. Hospital course c/b acute on chronic CHF - TTE with EF 20%, severely decreased LV and RV function, multiple regional motion abnormalities, s/p LHC revealing TVD, pleural/pericardial effusions, JAMEL, leukocytosis suspected to be in the setting of LE wound on IV ABX, persistent RLE pain w/ RLE Arterial Duplex revealing popliteal artery occlusion  Plan for eventual angiogram with vascular when medically optimized. Pulmonary called to consult as pt with c/o SOB.

## 2025-03-12 NOTE — PROGRESS NOTE ADULT - ASSESSMENT
64F, hx of CHF (last TTE on 1/16/25 EF 30-35%, G2DD), DM, diabetic foot ulcer with a recent admission at [facility] for CHF exacerbation 1/14-1/17 p/w worsening R. leg pain and fluid secretion, was meeting sepsis criteria with podiatry having low suspicion for right cellulitis and admitted for continued management of HF exacerbation and needing ischemic eval.     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  -Left leg Arterial Duplex: Slightly tardus parvus waveform of the left popliteal artery is noted, for which underlying disease cannot be excluded. Posterior tibial artery waveform is nonpulsatile. Anterior tibial artery tardus parvus flow is noted.   Transferred to [facility] for CO2 angiogram as per vascular surgery    #  PAD Wound of lower extremity.   ·  Plan: Podiatry following, b/l serous bullae, no concerns for infection  Found to have RLE coolness  -Right Leg Arterial Duplex: Popliteal artery is occluded with negligible flow of right trifurcation arteries.  -Left leg Arterial Duplex: Slightly tardus parvus waveform of the left popliteal artery is noted, for which underlying disease cannot be excluded. Posterior tibial artery waveform is nonpulsatile. Anterior tibial artery tardus parvus flow is noted.  -Started on heparin gtt and dobutamine gtt -Will transfer to [facility] for CO2 angiogram as per vascular surgery as not candidate for CTA due to worsening SCr  -Keep compression dressing  -LE elevation above heart level at rest.  -Transferred to [facility] for CO2 angiogram   - Overall this patient is at   intermediate  risk (for cardiac death, nonfatal myocardial infarction, and nonfatal cardiac arrest perioperatively for this intermediate  risk procedure).   No cardiac contraindications for CO2 vangiogram  There  are  no further recommendation for risk stratifying imaging/stress testing prior to planned surgery  Seen by Podiatry-No acute pod intervention, local wound care only-   PAD with rest ischemia  s/p AT/Popliteal angioplasty on DAPT        # HFrEF (congestive heart failure). Ischemic Cardiomyopathy  ·  Plan: Hx of HF, previous admission in January, on home Lasix 40 qD, Metoprolol Succ 25 qD. Not on Entresto due to insurance issues  Last TTE: LVSF moderately decreased w/ EF 30-35 %. Moderate G2DD. Mild MR  Stress test: small-sized, moderate defect(s) in the apical wall that is predominantly fixed suggestive of an infarction with minimal marcus-infarct ischemia  Ischemic cardiomyopathy  GDMT on hold 2/2 JAMEL  Repeat TTE EF 20% with WMA RAP~~8  Repeat Limited TTE  Taper off Milrinone and start GDMT  Is currently off Hydral/Isdn and Bumex 2/2 soft BP  Continue HD with UF had 2900mL removed yesterday will stop Milrinone and observe  Started  low dose BBlocker Metoprolol tartate 12.5 mg PO q8  03/10-Off Midodrine will reattempt Hyd/Isdn  03/11-Daily HD/UF   03/12-Awake s/p Tunelled HD catheter-Plan for Discharge to [facility]      # CAD  [facility]-RCA , severe ostial LM disease, diffuse disease in LAD and LCx, some L to R collaterals-seen by CTS advise cMR for viability poor target vessels for CABG-?High risk LM/LAD PCI  Had cMR-LVEF is 25%, greater than 75% subendocardial scarring involving the basal to mid inferior wall of the left ventricle, left ventricular transmural scarring involving the apical septal wall and likely near transmural scarring of the apical lateral wall. 50% scarring of the left ventricular basal inferoseptal wall.  03/05-s/p PCI-LM/LAD   Continue DAPT        # JAMEL on CKD   Creatinine Trend: 3.08<-- 4.40 <--3.84<--, 3.05<--, 4.46<--, 3.63<--, 2.35<--, 2.36<---3.38<--(HD)-- 4.76<--4.07<---3.90<-- 1.17  Has had 3 sessions HD for interrmittent HD to allow contrast based procedures is non oliguric  Plan to continue HD likely will need extended RRT  Srini change on 03/11      PLAN FOR REHAB  [facility]

## 2025-03-12 NOTE — PROGRESS NOTE ADULT - ASSESSMENT
64y Female with history of CHF presents as a transfer for LE CO2 angiogram. Nephrology consulted for elevated Scr.    1) JAMEL: likely due to ATN and CRS for which patient initiated on RRT on 2/20. JAMEL exacerbated by contrast nephropathy with last HD on 3/10 tolerated well with 1.5L removed. Plan for additional HD today. S/P TDC placement on 3/11. S/P straight cath for urinary retention. Monitor serial bladder scans (positive overnight). Patient wishes to be discharged to [facility] with onsite HD. Avoid nephrotoxins.    2) HTN: BP low normal. On midodrine pre-HD to avoid intradialytic hypotension.    3) LE edema: Off diuretics. UF with HD. TTE with severely decreased LVSF. Monitor UO.     4) Anemia: Hb improving with low TSAT. No IV iron given elevated ferritin. S/P Epo 8K X 1 dose 3/1. Will give another dose today. Monitor Hb.    5) Hyponatremia: Secondary to JAMEL and polydipsia. High dialysate Na bath. Continue with 1L FR.      [facility] NEPHROLOGY  Raji Waterman M.D.  Mars Feliz D.O.  Kelley Parks M.D.  MD Nancy Kulkarni, MSN, ANP-C    Telephone: [phone]  Facsimile: [phone] [address] [place] [address], [id number]  [place] [id number]

## 2025-03-12 NOTE — PROGRESS NOTE ADULT - PROBLEM SELECTOR PLAN 1
-Primarily with exertion & when laying flat. Pt denies SOB at rest.  -Suspect 2nd to fluid overload, underlying CHF  -Keep O>I as tolerated  -VBG 2/14 acceptable  -S/p ABX per primary team for LE wounds  -CTA chest with b/l pl effusions R>L, congestion, negative for PE  -Keep sats >90% with O2 PRN (currently RA).  -SOB and cough improved  -Incentive spirometry use encouraged.

## 2025-03-12 NOTE — CHART NOTE - NSCHARTNOTEFT_GEN_A_CORE
PARTHTOMASZ  Gender: Female  64y  [facility] 2DSU 241 W1    Patient not seen today, chart reviewed.     POCT Blood Glucose:  340 mg/dL (03-12-25 @ 12:17)  296 mg/dL (03-12-25 @ 07:57)  296 mg/dL (03-11-25 @ 21:24)  255 mg/dL (03-11-25 @ 17:07)      eMAR:atorvastatin   40 milliGRAM(s) Oral (03-11-25 @ 21:40)    insulin glargine Injectable (LANTUS)   12 Unit(s) SubCutaneous (03-11-25 @ 21:40)    insulin lispro (ADMELOG) corrective regimen sliding scale   4 Unit(s) SubCutaneous (03-12-25 @ 12:18)   3 Unit(s) SubCutaneous (03-12-25 @ 08:03)   3 Unit(s) SubCutaneous (03-11-25 @ 17:39)    insulin lispro (ADMELOG) corrective regimen sliding scale   1 Unit(s) SubCutaneous (03-11-25 @ 21:41)    insulin lispro Injectable (ADMELOG)   3 Unit(s) SubCutaneous (03-12-25 @ 08:04)   3 Unit(s) SubCutaneous (03-11-25 @ 17:39)    insulin lispro Injectable (ADMELOG)   5 Unit(s) SubCutaneous (03-12-25 @ 12:19)    Endocrinology following patient for T2DM management. In the last 24hrs BG levels have been 255-340mg/dL. FBG with severe hyperglycemia to 296mg/dL -> will increase Lantus to 14u QHS for tighter BG control. Noted persistent postprandial hyperglycemia, will increase mealtime insulin for tighter BG control. No hypoglycemia. BG goal 100-180mg/dL. Endocrine will closely monitor BG levels.

## 2025-03-12 NOTE — PROGRESS NOTE ADULT - SUBJECTIVE AND OBJECTIVE BOX
[facility] NEPHROLOGY- PROGRESS NOTE    64y Female with history of CHF presents as a transfer for LE CO2 angiogram. Nephrology consulted for elevated Scr.      REVIEW OF SYSTEMS:  Gen: no fevers  Cards: no chest pain  Resp: no dyspnea  GI: no nausea or vomiting or diarrhea  Vascular: no LE edema    Augmentin (Stomach Upset; Vomiting; Nausea)  No Known Allergies      Hospital Medications: Medications reviewed        VITALS:  T(F): 98.4 (03-12-25 @ 05:00), Max: 98.4 (03-11-25 @ 21:46)  HR: 97 (03-12-25 @ 05:00)  BP: 102/66 (03-12-25 @ 05:00)  RR: 18 (03-12-25 @ 05:00)  SpO2: 94% (03-12-25 @ 05:00)  Wt(kg): --    03-12 @ 07:01  -  03-12 @ 09:27  --------------------------------------------------------  IN: 125 mL / OUT: 0 mL / NET: 125 mL        PHYSICAL EXAM:  Gen: NAD, calm  Cards: RRR, +S1/S2, no M/G/R  Resp: bibasilar rales  GI: soft, NT/ND, NABS  Vascular: + LE wrapped B/L  + RIJ TDC intact        LABS:  03-12    130[L]  |  92[L]  |  37[H]  ----------------------------<  248[H]  4.4   |  20[L]  |  3.61[H]    Ca    8.6      12 Mar 2025 07:25  Phos  3.3     03-12  Mg     2.0     03-12      Creatinine Trend: 3.61 <--, 3.08 <--, 4.40 <--, 3.84 <--, 3.05 <--, 4.46 <--, 3.63 <--                        9.6    10.22 )-----------( 225      ( 12 Mar 2025 07:24 )             28.3     Urine Studies:  Urinalysis Basic - ( 12 Mar 2025 07:25 )    Color:  / Appearance:  / SG:  / pH:   Gluc: 248 mg/dL / Ketone:   / Bili:  / Urobili:    Blood:  / Protein:  / Nitrite:    Leuk Esterase:  / RBC:  / WBC    Sq Epi:  / Non Sq Epi:  / Bacteria:

## 2025-03-12 NOTE — PROGRESS NOTE ADULT - ASSESSMENT
63 YO F with PMHx of HFrEF 30-35 and grade 2 ddfxn (last TTE on 01/16/25), DM2, and diabetic foot ulcer. Recent admission at [facility] for ADHF. Patient now represents to OSH and ultimately to [facility] as a transfer for worsening right leg pain and fluid secretion. As per documentation, patient was reported to have severe depletion of RLE blood flow beyond the popliteal vessel at knee and similar findings in the left. Patient was transferred to [facility] for CO2 angiogram with Dr. Baltazar. Of note, patient also with reported visual disturbance for which patient was seen and evaluated by opthalmology. Internal Medicine has been consulted on Ms. Kirby's care for medical management.     # ADHF/ BiV HFrEF 20   - Recent admission at [facility] for ADHF and on dobutamine outpatient  - TTE 1/16 with EF 30-35 with moderately reduced LVSF and grade 2 ddfxn, normal RVSF , trace TR/ MS, and trace pericardial effusion  - RPT TTE with EF 20, severely decreased LV, decreased RVSF with TAPSE 1.2, and regional wall motion abnormalities present with entire septum, entire apex, entire inferior wall, mid inferolateral segment, and mid anterolateral segment are hypokinetic.   - RHC with RA 20, PA 49/29 (40), PCWP 35, mVO2 51.1, CO/CI 3.8/2.2, SVR 1095 w/ Multivessel CAD   - s/p zaroxyln 10 QD to augment diuresis   - s/p bumex GTT   - s/p HTS to augment diuresis   - RPT limited TTE w/ LVSF severely decreased, reduced RVSF, LVOT VTI 12, mild to mod TR, and mildly elevated right atrial pressure  - Case discussed with EP and needs to be on GDMT for 3 months before AICD placement and should be stabilized off of inotropic support.   - Was on bumex 4 IV QD , however anuric, has been DC'd   - S/P Milrinone gtt as per Cardio   - RPT ECHO post cath w/ EF 28 %, Regional WMA, multiple segmental abnormalities exist, Reduced RVSF    - HF and cards following and adjusting medications -->On Toprol XL 25 PO Qd and Hydralazine 10 Q8 on non HD Days per HF   - Monitor I and O, diuresis per Cardio/ Renal    - Monitor volume status   - Check daily weights    - Monitor on telemetry; Monitor electrolytes   - 3/11 overnight - episode of Bradycardia overnight while sleeping. Cont to monitor and tele. F/u Cardio    - Cardio, HF, Renal, and EP evals appreciated; F/u recs     # Tachycardia and Hypoxia  - Tachycardiac and on RA with SPO2 running 90-92   - Could be second to low cardiac output   - CTA with no PE  - 3/10 Duplex negative for DVT   - On Toprol XL 25 PO Qd. Monitor HR   - Monitor closely on Tele  - Cardio eval appreciated; F/u recs    # JAMEL with Hyponatremia and Metabolic Acidosis   - Baseline CRE 0.7-1.2 and increased to ~4  - As per OSH documentation, JAMEL was thought to be second to Unasyn/ Bumex / Entresto use and Hypotension   - US RENAL with no hydronephrosis  - Likely cardiorenal induced with low flow state in ADHF, however now rising again and concern for milrinone vs overdiuresis (prerenal) vs cardiorenal.   - Milrinone adjusted and diuresis turned off, however no improvement/ worsened in renal function noted.   - s/p shiley placement and started on HD 2/20  - Per discussion with renal will likely need long term HD with long term access - S/P shiley to permacath conversion on 3/11 with IR. HD per renal   - Avoid nephrotoxic agents  - Monitor Cr and daily BMP   - Renal and HF evals appreciated; F/u recs    # CAD with TVD   - NST abnormal with small-sized, moderate defect in apical wall that is predominantly fixed suggestive of an infarction with minimal marcus-infarct ischemia. Post stress LVEF 25.  - s/p LHC with RCA , severe ostial LM disease, diffuse disease in LAD and LCx, some L to R collaterals (Multivessel CAD)  - Case discussed with CTSx and NOT a candidate for CABG due to comorbidities  - Cardiac MRI with greater than 75% subendocardial scarring of the basal to mid-inferior wall of the LV and 50% scarring of the left ventricular basal inferoseptal wall. There is also left ventricular transmural scarring involving the apical septal wall and likely near transmural scarring of the apical lateral wall.  - s/p RPT LHC 3/4 with LM 80 s/p POBA/ VERA with LM 1, pLAD 90 s/p POBA/ VERA with pLAD 1, and Cx 80 s/p POBA with Cx 10.   - Continue on DAPT and Statin   - IC, Cards and HF following     # BL LE Edema likely in setting of ADHF  - Remove volume as per Cardio, HF and Renal   - BL LE elevation and compression  - LE Duplex neg   - Monitor for now    # Pericardial effusion likely in setting ADHF  - TTE with trace pericardial effusion   - CT Chest with small pericardial effusion   - Denies chest pain, palpitations, chest tightness or discomfort, shortness of breath or dyspnea   - Repeat serial TTE for further evaluation   - Diuresis per cardio/ renal.  - Monitor on telemetry  - Cardio following    # Pleural effusion likely in setting ADHF  - CT Chest with small BL pleural effusions  - Monitor O2 saturation  - Supplement to maintain > 90%   - Diuresis / HD per cardio/ renal.     # Hypernatremia to hyponatremia  - Hypernatremia likely from HTS vs over diuresis/ intravascular dehydration. HTS and diuresis stopped with improved sodium   - Hyponatremia now noted second to volume overload   - Avoid overcorrection > 6-8 mEq in 24 hours  - Monitor sodium with HD    # Transaminitis  - Likely 2/2 hepatic congestion   - Volume removal with HD as tolerated  - Trend LFTs, if uptrend post-HD initiation, check ABD US  - Serial ABD Examinations    # Leukocytosis likely in setting of BL LE ulcers with pop occlusion   - BCx negative   - UCx with probable contamination   - S/P unasyn for ABX.   - Leukocytosis fluctuating, however appears nontoxic with no fever spikes and monitoring closely off ABX  - If febrile check pan cultures   - Trend CBC, temp curve, VS and adjust as tolerated    # Foot ulcers with worsening RLE pain second to pop artery occlusion +/- diabetic ulcers   - RLE Arterial Duplex with popliteal artery occlusion   - LLE Arterial Duplex with underlying disease cannot be excluded   - s/p angio with pop and AT VEAR on 2/24   - Continue on Lipitor and DAPT  - Continue pain control with oxy  - Wound care called for dressing recs   - Vascular following,     # Visual Disturbance  - CT Head w/ old infarcts  - On ASA and Statin   - Opthalmology eval appreciated    # Indigestion and Constipation   - PPI, miralax and senna continued  - Monitor BMs    # Anemia likely mixed AOCD vs iron deficiency   - HH stable in 9s on HSQ  - Anemia panel with AOCD   - Started on venofer, but ferritin high and now stopped   - Continue on EPO with HD per renal  - Monitor HH   - Transfuse for Hgb < 8  - Maintain active T/S    # Diabetes Mellitus A1C 11.6   - Continue on lantus 13 with lispro 5 and ISS   - Diabetic DASH diet   - Monitor and adjust glucose levels PRN   - Endocrine following; F/u recs     # HLD and HLD  - Continue on lipitor and toprol  - Monitor BP    # DISPO TBD  - Palliative care called and patient remains FULL CODE       Discussed with Attending and ACP.

## 2025-03-12 NOTE — PROGRESS NOTE ADULT - SUBJECTIVE AND OBJECTIVE BOX
Name of Patient : TOMASZ ALBA  MRN: [id number]  Date of visit: 03-12-25 @ 12:30      Subjective: Patient seen and examined. No new events except as noted.   Patient seen earlier this AM. S/P permcath placement with IR yesterday. Lying down in bed. Denies chest pain, palpitations, shortness of breath or dyspnea.   Hyperglycemic. Endo follow up requested.     REVIEW OF SYSTEMS:    CONSTITUTIONAL: Generalized weakness   EYES/ENT: No visual changes;  No vertigo or throat pain   NECK: No pain or stiffness  RESPIRATORY: No cough, wheezing, hemoptysis; No shortness of breath  CARDIOVASCULAR: No chest pain or palpitations  GASTROINTESTINAL: No abdominal or epigastric pain. No nausea, vomiting, or hematemesis; No diarrhea or constipation. No melena or hematochezia.  GENITOURINARY: No dysuria, frequency or hematuria  NEUROLOGICAL: No numbness or weakness  SKIN: LE wrapped in ACE Wrap   All other review of systems is negative unless indicated above.      MEDICATIONS:  MEDICATIONS  (STANDING):  aspirin enteric coated 81 milliGRAM(s) Oral daily  atorvastatin 40 milliGRAM(s) Oral at bedtime  benzocaine/menthol Lozenge 1 Lozenge Oral once  bisacodyl 5 milliGRAM(s) Oral every 12 hours  chlorhexidine 4% Liquid 1 Application(s) Topical <User Schedule>  clopidogrel Tablet 75 milliGRAM(s) Oral daily  clopidogrel Tablet      dextrose 5%. 1000 milliLiter(s) (100 mL/Hr) IV Continuous <Continuous>  dextrose 5%. 1000 milliLiter(s) (50 mL/Hr) IV Continuous <Continuous>  dextrose 50% Injectable 25 Gram(s) IV Push once  dextrose 50% Injectable 12.5 Gram(s) IV Push once  dextrose 50% Injectable 25 Gram(s) IV Push once  epoetin day (EPOGEN) Injectable 8000 Unit(s) IV Push once  glucagon  Injectable 1 milliGRAM(s) IntraMuscular once  hydrALAZINE 10 milliGRAM(s) Oral every 8 hours  insulin glargine Injectable (LANTUS) 14 Unit(s) SubCutaneous at bedtime  insulin lispro (ADMELOG) corrective regimen sliding scale   SubCutaneous three times a day before meals  insulin lispro (ADMELOG) corrective regimen sliding scale   SubCutaneous at bedtime  insulin lispro Injectable (ADMELOG) 5 Unit(s) SubCutaneous three times a day with meals  metoprolol succinate ER 25 milliGRAM(s) Oral daily  midodrine. 10 milliGRAM(s) Oral once  Nephro-rober 1 Tablet(s) Oral daily  pantoprazole  Injectable 40 milliGRAM(s) IV Push daily  polyethylene glycol 3350 17 Gram(s) Oral daily  senna 2 Tablet(s) Oral at bedtime      PHYSICAL EXAM:  T(C): 36.8 (03-12-25 @ 11:48), Max: 36.9 (03-11-25 @ 21:46)  HR: 92 (03-12-25 @ 11:48) (78 - 103)  BP: 104/63 (03-12-25 @ 11:48) (93/60 - 104/63)  RR: 18 (03-12-25 @ 11:48) (18 - 18)  SpO2: 97% (03-12-25 @ 11:48) (94% - 97%)  Wt(kg): --  I&O's Summary    12 Mar 2025 07:01  -  12 Mar 2025 12:30  --------------------------------------------------------  IN: 125 mL / OUT: 0 mL / NET: 125 mL      Appearance: Normal	  HEENT: Eyes are open   Lymphatic: No lymphadenopathy grossly   Cardiovascular: Normal S1 S2   Respiratory: normal effort , clear  Gastrointestinal:  Soft, Non-tender  Skin: + Permcath; No rashes,  warm to touch  Psychiatry:  Mood & affect appropriate  Musculoskeletal: B/L LE wrapped in ACE wrap; LE wounds         03-12-25 @ 07:01  -  03-12-25 @ 12:30  --------------------------------------------------------  IN: 125 mL / OUT: 0 mL / NET: 125 mL                              9.6    10.22 )-----------( 225      ( 12 Mar 2025 07:24 )             28.3               03-12    130[L]  |  92[L]  |  37[H]  ----------------------------<  248[H]  4.4   |  20[L]  |  3.61[H]    Ca    8.6      12 Mar 2025 07:25  Phos  3.3     03-12  Mg     2.0     03-12      PT/INR - ( 11 Mar 2025 04:52 )   PT: 14.4 sec;   INR: 1.27 ratio         PTT - ( 11 Mar 2025 04:52 )  PTT:29.6 sec                   Urinalysis Basic - ( 12 Mar 2025 07:25 )    Color: x / Appearance: x / SG: x / pH: x  Gluc: 248 mg/dL / Ketone: x  / Bili: x / Urobili: x   Blood: x / Protein: x / Nitrite: x   Leuk Esterase: x / RBC: x / WBC x   Sq Epi: x / Non Sq Epi: x / Bacteria: x      < from: TTE W or WO Ultrasound Enhancing Agent (03.10.25 @ 12:48) >     CONCLUSIONS:      1. Left ventricular systolic function is severely decreased with an ejection fraction of 28 % by 3D. Regional wall motion abnormalities present.   2. Multiple segmental abnormalities exist. See findings.   3. Reduced right ventricular systolic function.   4. Compared to the transthoracic echocardiogram performed on 2/19/2025, there have been no significant interval changes.   5. Right pleural effusion noted.    < end of copied text >      < from: [facility] Duplex Lower Ext Vein Scan, Bilat (03.10.25 @ 13:55) >    IMPRESSION:  No evidence of deep venous thrombosis in either lower extremity.    Limited evaluation of the calf veins due to overlying dressings. Consider   short-term follow-up.    < end of copied text >

## 2025-03-12 NOTE — PROGRESS NOTE ADULT - SUBJECTIVE AND OBJECTIVE BOX
Follow-up Pulm Progress Note    No new respiratory events overnight.  Denies SOB/CP.     Medications:  MEDICATIONS  (STANDING):  aspirin enteric coated 81 milliGRAM(s) Oral daily  atorvastatin 40 milliGRAM(s) Oral at bedtime  benzocaine/menthol Lozenge 1 Lozenge Oral once  bisacodyl 5 milliGRAM(s) Oral every 12 hours  chlorhexidine 4% Liquid 1 Application(s) Topical <User Schedule>  clopidogrel Tablet 75 milliGRAM(s) Oral daily  clopidogrel Tablet      dextrose 5%. 1000 milliLiter(s) (100 mL/Hr) IV Continuous <Continuous>  dextrose 5%. 1000 milliLiter(s) (50 mL/Hr) IV Continuous <Continuous>  dextrose 50% Injectable 25 Gram(s) IV Push once  dextrose 50% Injectable 12.5 Gram(s) IV Push once  dextrose 50% Injectable 25 Gram(s) IV Push once  epoetin day (EPOGEN) Injectable 8000 Unit(s) IV Push once  glucagon  Injectable 1 milliGRAM(s) IntraMuscular once  hydrALAZINE 10 milliGRAM(s) Oral every 8 hours  insulin glargine Injectable (LANTUS) 14 Unit(s) SubCutaneous at bedtime  insulin lispro (ADMELOG) corrective regimen sliding scale   SubCutaneous three times a day before meals  insulin lispro (ADMELOG) corrective regimen sliding scale   SubCutaneous at bedtime  insulin lispro Injectable (ADMELOG) 5 Unit(s) SubCutaneous three times a day with meals  metoprolol succinate ER 25 milliGRAM(s) Oral daily  midodrine. 10 milliGRAM(s) Oral once  Nephro-rober 1 Tablet(s) Oral daily  pantoprazole  Injectable 40 milliGRAM(s) IV Push daily  polyethylene glycol 3350 17 Gram(s) Oral daily  senna 2 Tablet(s) Oral at bedtime    MEDICATIONS  (PRN):  acetaminophen     Tablet .. 650 milliGRAM(s) Oral every 6 hours PRN Mild Pain (1 - 3)  dextrose Oral Gel 15 Gram(s) Oral once PRN Blood Glucose LESS THAN 70 milliGRAM(s)/deciliter  HYDROmorphone  Injectable 1 milliGRAM(s) IV Push every 6 hours PRN Severe Pain (7 - 10)  melatonin 3 milliGRAM(s) Oral at bedtime PRN Insomnia  ondansetron Injectable 4 milliGRAM(s) IV Push every 6 hours PRN Nausea and/or Vomiting  sodium chloride 0.65% Nasal 1 Spray(s) Both Nostrils three times a day PRN Dryness  sodium chloride 0.9% lock flush 10 milliLiter(s) IV Push every 1 hour PRN Pre/post blood products, medications, blood draw, and to maintain line patency          Vital Signs Last 24 Hrs  T(C): 36.9 (12 Mar 2025 05:00), Max: 36.9 (11 Mar 2025 21:46)  T(F): 98.4 (12 Mar 2025 05:00), Max: 98.4 (11 Mar 2025 21:46)  HR: 97 (12 Mar 2025 05:00) (78 - 103)  BP: 102/66 (12 Mar 2025 05:00) (93/54 - 102/66)  BP(mean): --  RR: 18 (12 Mar 2025 05:00) (18 - 18)  SpO2: 94% (12 Mar 2025 05:00) (93% - 96%)    Parameters below as of 11 Mar 2025 10:54  Patient On (Oxygen Delivery Method): room air          VBG pH 7.39 03-11 @ 04:57    VBG pCO2 45 03-11 @ 04:57    VBG O2 sat 87.9 03-11 @ 04:57    VBG lactate 1.6 03-11 @ 04:57            LABS:                        9.6    10.22 )-----------( 225      ( 12 Mar 2025 07:24 )             28.3     03-12    130[L]  |  92[L]  |  37[H]  ----------------------------<  248[H]  4.4   |  20[L]  |  3.61[H]    Ca    8.6      12 Mar 2025 07:25  Phos  3.3     03-12  Mg     2.0     03-12            CAPILLARY BLOOD GLUCOSE      POCT Blood Glucose.: 296 mg/dL (12 Mar 2025 07:57)    PT/INR - ( 11 Mar 2025 04:52 )   PT: 14.4 sec;   INR: 1.27 ratio         PTT - ( 11 Mar 2025 04:52 )  PTT:29.6 sec  Urinalysis Basic - ( 12 Mar 2025 07:25 )    Color: x / Appearance: x / SG: x / pH: x  Gluc: 248 mg/dL / Ketone: x  / Bili: x / Urobili: x   Blood: x / Protein: x / Nitrite: x   Leuk Esterase: x / RBC: x / WBC x   Sq Epi: x / Non Sq Epi: x / Bacteria: x            Physical Examination:  PULM: Decreased BS at bases   CVS: S1, S2 heard    RADIOLOGY REVIEWED  < from: CT Angio Chest PE Protocol w/ IV Cont (03.07.25 @ 11:32) >    ACC: [id number] EXAM:  CT ANGIO CHEST PULM ART WAWIC   ORDERED BY: JORGE ALBERTO NEWMAN     PROCEDURE DATE:  03/07/2025          INTERPRETATION:  CLINICAL INFORMATION: Shortness of breath, evaluate for   pulmonary embolism.    COMPARISON: CT chest 1/25/2025    CONTRAST/COMPLICATIONS:  IV Contrast: Omnipaque 350  70 cc administered   30 cc discarded  Oral Contrast: NONE  .    PROCEDURE:  CT Angiography of the Chest.  Sagittal and coronal reformats were performed as well as 3D (MIP)   reconstructions.    FINDINGS:    LUNGS AND LARGE AIRWAYS: Minimal secretions within the left main   bronchus. Peripheral predominant groundglass opacities in all lobes.   Partial atelectasis of the bilateral lower lobes.  PLEURA: Small left and moderate right pleuraleffusions.  VESSELS: No pulmonary embolus is noted. Aortic calcifications. Coronary   artery calcifications. Right IJ central venous catheter with tip   terminating in the distal SVC.  HEART: Cardiomegaly. Trace pericardial effusion.  MEDIASTINUM ANDHILA: No lymphadenopathy.  CHEST WALL AND LOWER NECK: Within normal limits.  VISUALIZED UPPER ABDOMEN: Vascular calcifications.  BONES: Within normal limits.    IMPRESSION:  *  No pulmonary embolism.  *  Moderate right and small left pleural effusions.  *  Nonspecific peripheral groundglass opacities in the bilateral lungs,   slightly more prominent when compared to prior examination on 1/25/2025.        --- End of Report ---      < end of copied text >

## 2025-03-12 NOTE — PROGRESS NOTE ADULT - SUBJECTIVE AND OBJECTIVE BOX
Patient is a 64y old  Female who presents with a chief complaint of Transfer for CO2 angiogram; PAD and CLTI (12 Mar 2025 12:30)       INTERVAL HPI/OVERNIGHT EVENTS:  Patient seen and evaluated at bedside.  Pt is resting comfortable in NAD. Denies N/V/F/C.    Allergies    No Known Allergies    Intolerances    Augmentin (Stomach Upset; Vomiting; Nausea)      Vital Signs Last 24 Hrs  T(C): 36.4 (12 Mar 2025 16:30), Max: 36.9 (11 Mar 2025 21:46)  T(F): 97.6 (12 Mar 2025 16:30), Max: 98.4 (11 Mar 2025 21:46)  HR: 102 (12 Mar 2025 16:30) (92 - 103)  BP: 114/75 (12 Mar 2025 16:30) (99/62 - 114/75)  BP(mean): --  RR: 18 (12 Mar 2025 16:30) (16 - 18)  SpO2: 95% (12 Mar 2025 16:30) (94% - 98%)    Parameters below as of 12 Mar 2025 16:30  Patient On (Oxygen Delivery Method): room air        LABS:                        9.6    10.22 )-----------( 225      ( 12 Mar 2025 07:24 )             28.3     03-12    130[L]  |  92[L]  |  37[H]  ----------------------------<  248[H]  4.4   |  20[L]  |  3.61[H]    Ca    8.6      12 Mar 2025 07:25  Phos  3.3     03-12  Mg     2.0     03-12      PT/INR - ( 11 Mar 2025 04:52 )   PT: 14.4 sec;   INR: 1.27 ratio         PTT - ( 11 Mar 2025 04:52 )  PTT:29.6 sec  Urinalysis Basic - ( 12 Mar 2025 07:25 )    Color: x / Appearance: x / SG: x / pH: x  Gluc: 248 mg/dL / Ketone: x  / Bili: x / Urobili: x   Blood: x / Protein: x / Nitrite: x   Leuk Esterase: x / RBC: x / WBC x   Sq Epi: x / Non Sq Epi: x / Bacteria: x      CAPILLARY BLOOD GLUCOSE      POCT Blood Glucose.: 160 mg/dL (12 Mar 2025 17:22)  POCT Blood Glucose.: 340 mg/dL (12 Mar 2025 12:17)  POCT Blood Glucose.: 296 mg/dL (12 Mar 2025 07:57)  POCT Blood Glucose.: 296 mg/dL (11 Mar 2025 21:24)      Lower Extremity Physical Exam:  Vascular: DP/PT 0/4, B/L, CFT <3 seconds B/L, Temperature gradient warm to cool, B/L.   Neuro: Epicritic sensation diminished to the level of digits, B/L.  Musculoskeletal/Ortho: unremarkable   Skin: Bilateral lower extremity venous stasis wounds to dermis, mild serous drainage, no acute signs of infection. Left foot hallux distal tuft well adhered eschar, no acute signs of infection.     RADIOLOGY & ADDITIONAL TESTS:

## 2025-03-12 NOTE — PROGRESS NOTE ADULT - ASSESSMENT
64F presents with bilateral lower extremity wounds   - Patient seen and evaluated  - Afebrile, No leukocytosis  - RLE extensive gangrnous changes to foot dorsum, ankle, heel dorsomedial and lateral lower leg, ischemic changes to 2nd digit and hallux,  Left foot hallux distal tuft well adhered eschar, no acute signs of infection.   - Bilateral foot xray: no OM, no gas   - No culture taken secondary to no acute signs of infection   - Vasc recs, appreciated  - No acute pod intervention, local wound care only  - R foot is not salvageable from podiatry standpoint, and more proximal amputation is most likely warranted  - No further podiatric intervention neccsary, stable for discharge from podiatry standpoint, please reconsult as needed  - Wound care instruction and follow up information in discharge note provider   - Pending discussion with attending  64F presents with bilateral lower extremity wounds   - Patient seen and evaluated  - Afebrile, No leukocytosis  - RLE extensive gangrnous changes to foot dorsum, ankle, heel dorsomedial and lateral lower leg, ischemic changes to 2nd digit and hallux,  Left foot hallux distal tuft well adhered eschar, no acute signs of infection.   - Bilateral foot xray: no OM, no gas   - No culture taken secondary to no acute signs of infection   - Vasc recs, appreciated  - No acute pod intervention, local wound care only  - R foot is prognosis is highly guarded 2/2 extensive necrotic tissue, long term local wound care most likely won't benefit the patient  - please reconsult as needed  - Wound care instruction and follow up information in discharge note provider   - Discussed with attending

## 2025-03-12 NOTE — PROGRESS NOTE ADULT - SUBJECTIVE AND OBJECTIVE BOX
MR#[id number]  PATIENT NAME:COLE ALBA    DATE OF SERVICE: 03-12-25 @ 06:18  Patient was seen and examined by Yordy Gilmore MD on    03-12-25 @ 06:18 .  Interim events noted.Consultant notes ,Labs,Telemetry reviewed by me       HOSPITAL COURSE: HPI:  64F, hx of CHF (last TTE on 1/16/25 EF 30-35%, G2DD), DM, diabetic foot ulcer with a recent admission at [facility] for CHF exacerbation presented as a transfer for worsening R. leg pain and fluid secretion, On non-invasive imaging demonstrating severe depletion of RLE blood flow beyond the popliteal vessel at knee and similar findings in L. Was transferred to [facility] for CO2 angiogram with Dr. Baltazar. (29 Jan 2025 20:05)      INTERIM EVENTS:Patient seen at bedside ,interim events noted.  02/14-Had cath Multivessel CAD started on Milrinone for CTS evaluation albeit targets not optimal  02/15-Awake on Milrinone and Bumex gtt-seen by CTS not a surgical candidate-needs Vascular work-up for LE PAD-scheduled for Angiogram on Wednesday    Weight 170Kg---> 164 Kg--->161.1 Kg---151.2 ---> 155 --> 154.7 ---> 158 --> 148--->147 >148 --> 146 --> 141---> 149  Kg    02/25-s/p RLE angiogram with pop and AT angioplasty   02/27-Awake had iHD plan for High risk PCI oLM/LAD tomorrow 1000mL removed  Had ? pAF ~~3 secs  02/28-Awake no dyspnea chest pain plan for PCI-oLM/LAD todat HD after PCI-Sinus rhythm  03/01-Had LHC PCWP-15 still elevated with low BP Plan to dialyse over weekend and plan for PCI on Monday-No dyspnea  03/02-Awake had HD removed 1500Ml no dyspnea Plan for PCI tomorrow  03/03-Awake Sinus rhythm no dyspnea FOR High risk PCI oLM/LAD   03/04-Awake had HD last night-1500mL removed For High risk PCI today  03/05-Awake s/p LHC VERA x 1 to LAD, VERA to LM , POBA to CX via RFA  03/06-Awake no dyspnea chest pain  03/07-Awake poor UOP plan to continue HD  03/08-Awake Sinus Rhythm CTPA No PE Had HD 1500mL removed  03/09 Awake no dyspnea Sinus Rhythm-Had HD 2900  mL removed noted to be Tachy -Sinus~~120's  03/10-Awake not orthopneac less Tachy~~100's  03/11-Awake Had HD 1500mL removed episode sinus bradycardia  03/12-Awake had Tunnelled HD catheter placed          PMH -reviewed admission note, no change since admission  HEART FAILURE: Acute[x ]Chronic[ ] Systolic[x ] Diastolic[ ] Combined Systolic and Diastolic[ ]  CAD[x ] CABG[ ] PCI[ ]  DEVICES[ ] PPM[ ] ICD[ ] ILR[ ]  ATRIAL FIBRILLATION[ ] Paroxysmal[ ] Permanent[ ] CHADS2-[  ]  JAMEL[x ] CKD1[ ] CKD2[ ] CKD3[ ] CKD4[x ] ESRD[ ]  COPD[ ] HTN[x ]   DM[x ] Type1[ ] Type 2[x ]   CVA[ ] Paresis[ ]    AMBULATION: Assisted[x ] Cane/walker[ ] Independent[ ]      MEDICATIONS  (STANDING):  aspirin enteric coated 81 milliGRAM(s) Oral daily  atorvastatin 40 milliGRAM(s) Oral at bedtime  benzocaine/menthol Lozenge 1 Lozenge Oral once  bisacodyl 5 milliGRAM(s) Oral every 12 hours  chlorhexidine 4% Liquid 1 Application(s) Topical <User Schedule>  clopidogrel Tablet 75 milliGRAM(s) Oral daily  clopidogrel Tablet      epoetin day (EPOGEN) Injectable 8000 Unit(s) SubCutaneous once  glucagon  Injectable 1 milliGRAM(s) IntraMuscular once  hydrALAZINE 10 milliGRAM(s) Oral every 8 hours  insulin glargine Injectable (LANTUS) 12 Unit(s) SubCutaneous at bedtime  insulin lispro (ADMELOG) corrective regimen sliding scale   SubCutaneous three times a day before meals  insulin lispro (ADMELOG) corrective regimen sliding scale   SubCutaneous at bedtime  insulin lispro Injectable (ADMELOG) 3 Unit(s) SubCutaneous three times a day with meals  metoprolol succinate ER 25 milliGRAM(s) Oral daily  midodrine. 10 milliGRAM(s) Oral once  Nephro-rober 1 Tablet(s) Oral daily  pantoprazole  Injectable 40 milliGRAM(s) IV Push daily  polyethylene glycol 3350 17 Gram(s) Oral daily  senna 2 Tablet(s) Oral at bedtime    MEDICATIONS  (PRN):  acetaminophen     Tablet .. 650 milliGRAM(s) Oral every 6 hours PRN Mild Pain (1 - 3)  dextrose Oral Gel 15 Gram(s) Oral once PRN Blood Glucose LESS THAN 70 milliGRAM(s)/deciliter  HYDROmorphone  Injectable 1 milliGRAM(s) IV Push every 6 hours PRN Severe Pain (7 - 10)  melatonin 3 milliGRAM(s) Oral at bedtime PRN Insomnia  ondansetron Injectable 4 milliGRAM(s) IV Push every 6 hours PRN Nausea and/or Vomiting  sodium chloride 0.65% Nasal 1 Spray(s) Both Nostrils three times a day PRN Dryness  sodium chloride 0.9% lock flush 10 milliLiter(s) IV Push every 1 hour PRN Pre/post blood products, medications, blood draw, and to maintain line patency            REVIEW OF SYSTEMS:  Constitutional: [ ] fever, [ ]weight loss,  [x ]fatigue [ ]weight gain  Eyes: [ ] visual changes  Respiratory: [ ]shortness of breath;  [ ] cough, [ ]wheezing, [ ]chills, [ ]hemoptysis  Cardiovascular: [ ] chest pain, [ ]palpitations, [ ]dizziness,  [ ]leg swelling[ ]orthopnea[ ]PND  Gastrointestinal: [ ] abdominal pain, [ ]nausea, [ ]vomiting,  [ ]diarrhea [ ]Constipation [ ]Melena  Genitourinary: [ ] dysuria, [ ] hematuria [ ][place]  Neurologic: [ ] headaches [ ] tremors[ ]weakness [ ]Paralysis Right[ ] Left[ ]  Skin: [ ] itching, [ ]burning, [ ] rashes  Endocrine: [ ] heat or cold intolerance  Musculoskeletal: [ ] joint pain or swelling; [ ] muscle, back, or extremity pain  Psychiatric: [ ] depression, [ ]anxiety, [ ]mood swings, or [ ]difficulty sleeping  Hematologic: [ ] easy bruising, [ ] bleeding gums    [ ] All remaining systems negative except as per above.   [ ]Unable to obtain.  [x] No change in ROS since admission      Vital Signs Last 24 Hrs  T(C): 36.9 (12 Mar 2025 05:00), Max: 36.9 (11 Mar 2025 21:46)  T(F): 98.4 (12 Mar 2025 05:00), Max: 98.4 (11 Mar 2025 21:46)  HR: 97 (12 Mar 2025 05:00) (78 - 103)  BP: 102/66 (12 Mar 2025 05:00) (93/54 - 108/57)  BP(mean): 73 (11 Mar 2025 10:05) (60 - 76)  RR: 18 (12 Mar 2025 05:00) (18 - 25)  SpO2: 94% (12 Mar 2025 05:00) (89% - 100%)    Parameters below as of 11 Mar 2025 10:54  Patient On (Oxygen Delivery Method): room air      I&O's Summary    10 Mar 2025 07:01  -  11 Mar 2025 07:00  --------------------------------------------------------  IN: 480 mL / OUT: 1500 mL / NET: -1020 mL        PHYSICAL EXAM:  General: No acute distress BMI-24  HEENT: EOMI, PERRL  Neck: Supple, [ ] JVD  Lungs: Equal air entry bilaterally; [ ] rales [ ] wheezing [ ] rhonchi  Heart: Regular rate and rhythm; [x ] murmur   2/6 [ x] systolic [ ] diastolic [ ] radiation[ ] rubs [ ]  gallops  Abdomen: Nontender, bowel sounds present  Extremities: No clubbing, cyanosis, [ ] edema [x ]Warm, well perfused        RLE: Dressings in place, blistering and ulceration on the dorsal aspect of the foot        LLE: First digit dry gangrene on the plantar aspect  Nervous system:  Alert & Oriented X3, no focal deficits  Psychiatric: Normal affect  Skin: No rashes or lesions    LABS:  03-11    133[L]  |  96  |  26[H]  ----------------------------<  180[H]  3.9   |  22  |  3.08[H]    Ca    8.6      11 Mar 2025 04:52  Phos  3.3     03-11  Mg     1.9     03-11      Creatinine Trend: 3.08<--, 4.40<--, 3.84<--, 3.05<--, 4.46<--, 3.63<--                        9.6    9.22  )-----------( 211      ( 11 Mar 2025 04:52 )             27.7     PT/INR - ( 11 Mar 2025 04:52 )   PT: 14.4 sec;   INR: 1.27 ratio         PTT - ( 11 Mar 2025 04:52 )  PTT:29.6 sec      Hepatitis A IgM Antibody (02.20.25 @ 11:56)   Hepatitis A IgM Antibody: Nonreact  Hepatitis B Surface AB Quantitative (02.20.25 @ 11:56)   Hepatitis B Surface AB Quantitative: 70.7:  HEPBSAB Quant Interpretation:   >= 12.0 Immune   8.0-11.9 Borderline   < 8.0 Non-Immune mIU/mL  Hepatitis B Surface Antigen (02.20.25 @ 11:56)   Hepatitis B Surface Antigen: Nonreact  Hepatitis B Core Antibody, Total (02.20.25 @ 11:56)   Hepatitis B Core Antibody, Total: Nonreact  Hepatitis B Core IgM Antibody (02.20.25 @ 11:56)   Hepatitis B Core IgM Antibody: Nonreact    03/11/2025  · Procedure Findings and Details	indwelling right ij non tunneled dialysis catheter removed over a wire. right ij tunneled dialysis catheter placed. tip in svc. ok to access.     TTE W or WO Ultrasound Enhancing Agent (03.10.25 @ 12:48) >  CONCLUSIONS:      1. Left ventricular systolic function is severely decreased with an ejection fraction of 28 % by 3D. Regional wall motion abnormalities present.   2. Multiple segmental abnormalities exist.The basal and mid anterior septum, basal and mid inferior septum, basal and mid inferior wall, and mid inferolateral segment are akinetic.  The entire apex, entire anterior wall, basal and mid anterolateral wall, and basal inferolateral segment are hypokinetic.   3. Reduced right ventricular systolic function.   4. Compared to the transthoracic echocardiogram performed on 2/19/2025, there have been no significant interval changes.   5. Right pleural effusion noted.       VA Duplex Lower Ext Vein Scan, Bilat (03.10.25 @ 13:55) >    IMPRESSION:  No evidence of deep venous thrombosis in either lower extremity.

## 2025-03-13 LAB
GLUCOSE BLDC GLUCOMTR-MCNC: 193 MG/DL — HIGH (ref 70–99)
GLUCOSE BLDC GLUCOMTR-MCNC: 200 MG/DL — HIGH (ref 70–99)
GLUCOSE BLDC GLUCOMTR-MCNC: 205 MG/DL — HIGH (ref 70–99)
GLUCOSE BLDC GLUCOMTR-MCNC: 232 MG/DL — HIGH (ref 70–99)
HCV AB S/CO SERPL IA: 0.06 S/CO — SIGNIFICANT CHANGE UP
HCV AB SERPL-IMP: SIGNIFICANT CHANGE UP

## 2025-03-13 PROCEDURE — 99232 SBSQ HOSP IP/OBS MODERATE 35: CPT

## 2025-03-13 PROCEDURE — 99223 1ST HOSP IP/OBS HIGH 75: CPT

## 2025-03-13 RX ORDER — INSULIN GLARGINE-YFGN 100 [IU]/ML
15 INJECTION, SOLUTION SUBCUTANEOUS AT BEDTIME
Refills: 0 | Status: DISCONTINUED | OUTPATIENT
Start: 2025-03-13 | End: 2025-03-14

## 2025-03-13 RX ORDER — MIDODRINE HYDROCHLORIDE 5 MG/1
10 TABLET ORAL ONCE
Refills: 0 | Status: COMPLETED | OUTPATIENT
Start: 2025-03-14 | End: 2025-03-14

## 2025-03-13 RX ORDER — ACETAMINOPHEN 500 MG/5ML
1000 LIQUID (ML) ORAL ONCE
Refills: 0 | Status: DISCONTINUED | OUTPATIENT
Start: 2025-03-13 | End: 2025-03-13

## 2025-03-13 RX ADMIN — Medication 650 MILLIGRAM(S): at 09:00

## 2025-03-13 RX ADMIN — INSULIN LISPRO 6 UNIT(S): 100 INJECTION, SOLUTION INTRAVENOUS; SUBCUTANEOUS at 17:05

## 2025-03-13 RX ADMIN — INSULIN LISPRO 2: 100 INJECTION, SOLUTION INTRAVENOUS; SUBCUTANEOUS at 11:40

## 2025-03-13 RX ADMIN — Medication 40 MILLIGRAM(S): at 11:40

## 2025-03-13 RX ADMIN — INSULIN LISPRO 1: 100 INJECTION, SOLUTION INTRAVENOUS; SUBCUTANEOUS at 08:01

## 2025-03-13 RX ADMIN — ATORVASTATIN CALCIUM 40 MILLIGRAM(S): 80 TABLET, FILM COATED ORAL at 22:01

## 2025-03-13 RX ADMIN — Medication 81 MILLIGRAM(S): at 11:39

## 2025-03-13 RX ADMIN — METOPROLOL SUCCINATE 25 MILLIGRAM(S): 50 TABLET, EXTENDED RELEASE ORAL at 05:19

## 2025-03-13 RX ADMIN — Medication 650 MILLIGRAM(S): at 01:17

## 2025-03-13 RX ADMIN — Medication 1 MILLIGRAM(S): at 13:20

## 2025-03-13 RX ADMIN — INSULIN LISPRO 1: 100 INJECTION, SOLUTION INTRAVENOUS; SUBCUTANEOUS at 17:05

## 2025-03-13 RX ADMIN — INSULIN GLARGINE-YFGN 15 UNIT(S): 100 INJECTION, SOLUTION SUBCUTANEOUS at 22:00

## 2025-03-13 RX ADMIN — INSULIN LISPRO 6 UNIT(S): 100 INJECTION, SOLUTION INTRAVENOUS; SUBCUTANEOUS at 08:02

## 2025-03-13 RX ADMIN — Medication 1 MILLIGRAM(S): at 01:17

## 2025-03-13 RX ADMIN — Medication 1 TABLET(S): at 11:40

## 2025-03-13 RX ADMIN — Medication 650 MILLIGRAM(S): at 08:59

## 2025-03-13 RX ADMIN — Medication 650 MILLIGRAM(S): at 17:11

## 2025-03-13 RX ADMIN — CLOPIDOGREL BISULFATE 75 MILLIGRAM(S): 75 TABLET, FILM COATED ORAL at 11:39

## 2025-03-13 RX ADMIN — Medication 1 MILLIGRAM(S): at 12:27

## 2025-03-13 RX ADMIN — INSULIN LISPRO 6 UNIT(S): 100 INJECTION, SOLUTION INTRAVENOUS; SUBCUTANEOUS at 11:40

## 2025-03-13 RX ADMIN — Medication 650 MILLIGRAM(S): at 18:00

## 2025-03-13 RX ADMIN — Medication 3 MILLIGRAM(S): at 22:05

## 2025-03-13 NOTE — PROGRESS NOTE ADULT - SUBJECTIVE AND OBJECTIVE BOX
Follow-up Pulm Progress Note    No new respiratory events overnight.  Denies SOB/CP.     Medications:  MEDICATIONS  (STANDING):  aspirin enteric coated 81 milliGRAM(s) Oral daily  atorvastatin 40 milliGRAM(s) Oral at bedtime  benzocaine/menthol Lozenge 1 Lozenge Oral once  bisacodyl 5 milliGRAM(s) Oral every 12 hours  chlorhexidine 4% Liquid 1 Application(s) Topical <User Schedule>  clopidogrel Tablet 75 milliGRAM(s) Oral daily  clopidogrel Tablet      dextrose 5%. 1000 milliLiter(s) (100 mL/Hr) IV Continuous <Continuous>  dextrose 5%. 1000 milliLiter(s) (50 mL/Hr) IV Continuous <Continuous>  dextrose 50% Injectable 25 Gram(s) IV Push once  dextrose 50% Injectable 12.5 Gram(s) IV Push once  dextrose 50% Injectable 25 Gram(s) IV Push once  glucagon  Injectable 1 milliGRAM(s) IntraMuscular once  insulin glargine Injectable (LANTUS) 15 Unit(s) SubCutaneous at bedtime  insulin lispro (ADMELOG) corrective regimen sliding scale   SubCutaneous three times a day before meals  insulin lispro (ADMELOG) corrective regimen sliding scale   SubCutaneous at bedtime  insulin lispro Injectable (ADMELOG) 6 Unit(s) SubCutaneous three times a day with meals  metoprolol succinate ER 25 milliGRAM(s) Oral daily  Nephro-rober 1 Tablet(s) Oral daily  pantoprazole  Injectable 40 milliGRAM(s) IV Push daily  polyethylene glycol 3350 17 Gram(s) Oral daily  senna 2 Tablet(s) Oral at bedtime    MEDICATIONS  (PRN):  acetaminophen     Tablet .. 650 milliGRAM(s) Oral every 6 hours PRN Mild Pain (1 - 3)  dextrose Oral Gel 15 Gram(s) Oral once PRN Blood Glucose LESS THAN 70 milliGRAM(s)/deciliter  HYDROmorphone  Injectable 1 milliGRAM(s) IV Push every 6 hours PRN Severe Pain (7 - 10)  melatonin 3 milliGRAM(s) Oral at bedtime PRN Insomnia  ondansetron Injectable 4 milliGRAM(s) IV Push every 6 hours PRN Nausea and/or Vomiting  sodium chloride 0.65% Nasal 1 Spray(s) Both Nostrils three times a day PRN Dryness  sodium chloride 0.9% lock flush 10 milliLiter(s) IV Push every 1 hour PRN Pre/post blood products, medications, blood draw, and to maintain line patency          Vital Signs Last 24 Hrs  T(C): 36.5 (13 Mar 2025 11:18), Max: 37.1 (13 Mar 2025 04:19)  T(F): 97.7 (13 Mar 2025 11:18), Max: 98.7 (13 Mar 2025 04:19)  HR: 87 (13 Mar 2025 11:18) (87 - 106)  BP: 98/60 (13 Mar 2025 11:18) (98/60 - 114/75)  BP(mean): 79 (12 Mar 2025 20:20) (79 - 79)  RR: 18 (13 Mar 2025 11:18) (16 - 18)  SpO2: 99% (13 Mar 2025 11:18) (91% - 99%)    Parameters below as of 13 Mar 2025 11:18  Patient On (Oxygen Delivery Method): room air              03-12 @ 07:01  -  03-13 @ 07:00  --------------------------------------------------------  IN: 565 mL / OUT: 2000 mL / NET: -1435 mL          LABS:                        9.6    10.22 )-----------( 225      ( 12 Mar 2025 07:24 )             28.3     03-12    130[L]  |  92[L]  |  37[H]  ----------------------------<  248[H]  4.4   |  20[L]  |  3.61[H]    Ca    8.6      12 Mar 2025 07:25  Phos  3.3     03-12  Mg     2.0     03-12            CAPILLARY BLOOD GLUCOSE      POCT Blood Glucose.: 232 mg/dL (13 Mar 2025 11:15)      Urinalysis Basic - ( 12 Mar 2025 07:25 )    Color: x / Appearance: x / SG: x / pH: x  Gluc: 248 mg/dL / Ketone: x  / Bili: x / Urobili: x   Blood: x / Protein: x / Nitrite: x   Leuk Esterase: x / RBC: x / WBC x   Sq Epi: x / Non Sq Epi: x / Bacteria: x                  Physical Examination:  PULM: Decreased BS at bases, bibasilar crackles   CVS: S1, S2 heard    RADIOLOGY REVIEWED  < from: CT Angio Chest PE Protocol w/ IV Cont (03.07.25 @ 11:32) >    ACC: [id number] EXAM:  CT ANGIO CHEST PULM ART WAWI   ORDERED BY: JORGE ALBERTO NEWMAN     PROCEDURE DATE:  03/07/2025          INTERPRETATION:  CLINICAL INFORMATION: Shortness of breath, evaluate for   pulmonary embolism.    COMPARISON: CT chest 1/25/2025    CONTRAST/COMPLICATIONS:  IV Contrast: Omnipaque 350  70 cc administered   30 cc discarded  Oral Contrast: NONE  .    PROCEDURE:  CT Angiography of the Chest.  Sagittal and coronal reformats were performed as well as 3D (MIP)   reconstructions.    FINDINGS:    LUNGS AND LARGE AIRWAYS: Minimal secretions within the left main   bronchus. Peripheral predominant groundglass opacities in all lobes.   Partial atelectasis of the bilateral lower lobes.  PLEURA: Small left and moderate right pleuraleffusions.  VESSELS: No pulmonary embolus is noted. Aortic calcifications. Coronary   artery calcifications. Right IJ central venous catheter with tip   terminating in the distal SVC.  HEART: Cardiomegaly. Trace pericardial effusion.  MEDIASTINUM ANDHILA: No lymphadenopathy.  CHEST WALL AND LOWER NECK: Within normal limits.  VISUALIZED UPPER ABDOMEN: Vascular calcifications.  BONES: Within normal limits.    IMPRESSION:  *  No pulmonary embolism.  *  Moderate right and small left pleural effusions.  *  Nonspecific peripheral groundglass opacities in the bilateral lungs,   slightly more prominent when compared to prior examination on 1/25/2025.        --- End of Report ---      < end of copied text >

## 2025-03-13 NOTE — CONSULT NOTE ADULT - SUBJECTIVE AND OBJECTIVE BOX
CHIEF COMPLAINT:    HISTORY OF PRESENT ILLNESS:      Allergies    No Known Allergies    Intolerances    Augmentin (Stomach Upset; Vomiting; Nausea)  	    MEDICATIONS:  aspirin enteric coated 81 milliGRAM(s) Oral daily  clopidogrel Tablet 75 milliGRAM(s) Oral daily  clopidogrel Tablet      metoprolol succinate ER 25 milliGRAM(s) Oral daily        acetaminophen     Tablet .. 650 milliGRAM(s) Oral every 6 hours PRN  HYDROmorphone  Injectable 1 milliGRAM(s) IV Push every 6 hours PRN  melatonin 3 milliGRAM(s) Oral at bedtime PRN  ondansetron Injectable 4 milliGRAM(s) IV Push every 6 hours PRN    bisacodyl 5 milliGRAM(s) Oral every 12 hours  pantoprazole  Injectable 40 milliGRAM(s) IV Push daily  polyethylene glycol 3350 17 Gram(s) Oral daily  senna 2 Tablet(s) Oral at bedtime    atorvastatin 40 milliGRAM(s) Oral at bedtime  dextrose 50% Injectable 25 Gram(s) IV Push once  dextrose 50% Injectable 12.5 Gram(s) IV Push once  dextrose 50% Injectable 25 Gram(s) IV Push once  dextrose Oral Gel 15 Gram(s) Oral once PRN  glucagon  Injectable 1 milliGRAM(s) IntraMuscular once  insulin glargine Injectable (LANTUS) 15 Unit(s) SubCutaneous at bedtime  insulin lispro (ADMELOG) corrective regimen sliding scale   SubCutaneous three times a day before meals  insulin lispro (ADMELOG) corrective regimen sliding scale   SubCutaneous at bedtime  insulin lispro Injectable (ADMELOG) 6 Unit(s) SubCutaneous three times a day with meals    benzocaine/menthol Lozenge 1 Lozenge Oral once  chlorhexidine 4% Liquid 1 Application(s) Topical <User Schedule>  dextrose 5%. 1000 milliLiter(s) IV Continuous <Continuous>  dextrose 5%. 1000 milliLiter(s) IV Continuous <Continuous>  Nephro-rober 1 Tablet(s) Oral daily  sodium chloride 0.65% Nasal 1 Spray(s) Both Nostrils three times a day PRN  sodium chloride 0.9% lock flush 10 milliLiter(s) IV Push every 1 hour PRN      PAST MEDICAL & SURGICAL HISTORY:  DM (diabetes mellitus)      Diabetic foot ulcer      Congestive heart failure (CHF)      CAD (coronary artery disease)      Cataract of both eyes, unspecified cataract type      History of cholecystectomy          FAMILY HISTORY:  Family history of CHF (congestive heart failure) (Mother)        SOCIAL HISTORY:    [ ] Non-smoker  [ ] Smoker  [ ] Alcohol      REVIEW OF SYSTEMS:  See HPI. Otherwise, 10 point ROS done and otherwise negative.    PHYSICAL EXAM:  T(C): 36.5 (03-13-25 @ 11:18), Max: 37.1 (03-13-25 @ 04:19)  HR: 87 (03-13-25 @ 11:18) (87 - 106)  BP: 98/60 (03-13-25 @ 11:18) (98/60 - 114/75)  RR: 18 (03-13-25 @ 11:18) (16 - 18)  SpO2: 99% (03-13-25 @ 11:18) (91% - 99%)  Wt(kg): --  I&O's Summary    12 Mar 2025 07:01  -  13 Mar 2025 07:00  --------------------------------------------------------  IN: 565 mL / OUT: 2000 mL / NET: -1435 mL    13 Mar 2025 07:01  -  13 Mar 2025 12:00  --------------------------------------------------------  IN: 125 mL / OUT: 0 mL / NET: 125 mL        Appearance: Normal	  HEENT:   Normal oral mucosa, PERRL, EOMI	  Lymphatic: No lymphadenopathy  Cardiovascular: Normal S1 S2, No JVD, No murmurs, No edema  Respiratory: Lungs clear to auscultation	  Psychiatry: A & O x 3, Mood & affect appropriate  Gastrointestinal:  Soft, Non-tender, + BS	  Skin: No rashes, No ecchymoses, No cyanosis	  Neurologic: Non-focal  Extremities: Normal range of motion, No clubbing, cyanosis or edema  Vascular: Peripheral pulses palpable 2+ bilaterally        LABS:	 	    CBC Full  -  ( 12 Mar 2025 07:24 )  WBC Count : 10.22 K/uL  Hemoglobin : 9.6 g/dL  Hematocrit : 28.3 %  Platelet Count - Automated : 225 K/uL  Mean Cell Volume : 74.5 fl  Mean Cell Hemoglobin : 25.3 pg  Mean Cell Hemoglobin Concentration : 33.9 g/dL  Auto Neutrophil # : x  Auto Lymphocyte # : x  Auto Monocyte # : x  Auto Eosinophil # : x  Auto Basophil # : x  Auto Neutrophil % : x  Auto Lymphocyte % : x  Auto Monocyte % : x  Auto Eosinophil % : x  Auto Basophil % : x    03-12    130[L]  |  92[L]  |  37[H]  ----------------------------<  248[H]  4.4   |  20[L]  |  3.61[H]    Ca    8.6      12 Mar 2025 07:25  Phos  3.3     03-12  Mg     2.0     03-12    TELEMETRY: 	  ARETHA      	        CHIEF COMPLAINT:    HISTORY OF PRESENT ILLNESS: 64 year old Female, hx of CHF (1/16/25 EF 30-35%, grade 2 ddfxn), DM, diabetic foot ulcer with a recent admission at [facility] for CHF exacerbation 1/14-1/17 p/w worsening Right leg pain and fluid secretion, was meeting sepsis criteria.    As per documentation, patient was reported to have severe depletion of RLE blood flow beyond the popliteal vessel at knee and similar findings in the left.  Admitted for continued management of HF exacerbation and needing ischemic eval.  Being treated for JAMEL with Hyponatremia and Metabolic Acidosis and is s/p shiley placement and started on HD 2/20.  S/p LHC with RCA , severe ostial LM disease, diffuse disease in LAD and LCx, some L to R collaterals (Multivessel CAD) and was not a candidate for CABG due to comorbidities.   Cardiac MRI with greater than 75% subendocardial scarring of the basal to mid-inferior wall of the LV and 50% scarring of the left ventricular basal inferoseptal wall. There is also left ventricular transmural scarring involving the apical septal wall and likely near transmural scarring of the apical lateral wall.  On 3/4 underwent high risk PCI with stent to LM/LAD.  EP consulted for bradycardia.       Allergies    No Known Allergies    Intolerances    Augmentin (Stomach Upset; Vomiting; Nausea)  	    MEDICATIONS:  aspirin enteric coated 81 milliGRAM(s) Oral daily  clopidogrel Tablet 75 milliGRAM(s) Oral daily  clopidogrel Tablet      metoprolol succinate ER 25 milliGRAM(s) Oral daily    acetaminophen     Tablet .. 650 milliGRAM(s) Oral every 6 hours PRN  HYDROmorphone  Injectable 1 milliGRAM(s) IV Push every 6 hours PRN  melatonin 3 milliGRAM(s) Oral at bedtime PRN  ondansetron Injectable 4 milliGRAM(s) IV Push every 6 hours PRN    bisacodyl 5 milliGRAM(s) Oral every 12 hours  pantoprazole  Injectable 40 milliGRAM(s) IV Push daily  polyethylene glycol 3350 17 Gram(s) Oral daily  senna 2 Tablet(s) Oral at bedtime    atorvastatin 40 milliGRAM(s) Oral at bedtime  dextrose 50% Injectable 25 Gram(s) IV Push once  dextrose 50% Injectable 12.5 Gram(s) IV Push once  dextrose 50% Injectable 25 Gram(s) IV Push once  dextrose Oral Gel 15 Gram(s) Oral once PRN  glucagon  Injectable 1 milliGRAM(s) IntraMuscular once  insulin glargine Injectable (LANTUS) 15 Unit(s) SubCutaneous at bedtime  insulin lispro (ADMELOG) corrective regimen sliding scale   SubCutaneous three times a day before meals  insulin lispro (ADMELOG) corrective regimen sliding scale   SubCutaneous at bedtime  insulin lispro Injectable (ADMELOG) 6 Unit(s) SubCutaneous three times a day with meals    benzocaine/menthol Lozenge 1 Lozenge Oral once  chlorhexidine 4% Liquid 1 Application(s) Topical <User Schedule>  dextrose 5%. 1000 milliLiter(s) IV Continuous <Continuous>  dextrose 5%. 1000 milliLiter(s) IV Continuous <Continuous>  Nephro-rober 1 Tablet(s) Oral daily  sodium chloride 0.65% Nasal 1 Spray(s) Both Nostrils three times a day PRN  sodium chloride 0.9% lock flush 10 milliLiter(s) IV Push every 1 hour PRN      PAST MEDICAL & SURGICAL HISTORY:  DM (diabetes mellitus)    Diabetic foot ulcer    Congestive heart failure (CHF)    CAD (coronary artery disease)    Cataract of both eyes, unspecified cataract type    History of cholecystectomy    FAMILY HISTORY:  Family history of CHF (congestive heart failure) (Mother)      SOCIAL HISTORY:    [ ] Non-smoker  [ ] Smoker  [ ] Alcohol    REVIEW OF SYSTEMS:  See HPI. Otherwise, 12 point ROS done and otherwise negative.    PHYSICAL EXAM:  T(C): 36.5 (03-13-25 @ 11:18), Max: 37.1 (03-13-25 @ 04:19)  HR: 87 (03-13-25 @ 11:18) (87 - 106)  BP: 98/60 (03-13-25 @ 11:18) (98/60 - 114/75)  RR: 18 (03-13-25 @ 11:18) (16 - 18)  SpO2: 99% (03-13-25 @ 11:18) (91% - 99%)    12 Mar 2025 07:01  -  13 Mar 2025 07:00  --------------------------------------------------------  IN: 565 mL / OUT: 2000 mL / NET: -1435 mL    13 Mar 2025 07:01  -  13 Mar 2025 12:00  --------------------------------------------------------  IN: 125 mL / OUT: 0 mL / NET: 125 mL    Appearance: Normal	  HEENT:   Normal oral mucosa, PERRL, EOMI	  Cardiovascular: Normal S1 S2, regular. No JVD, No murmurs, No edema  Respiratory: Lungs clear to auscultation	  Psychiatry: A & O x 3, Mood & affect appropriate  Gastrointestinal:  Soft, Non-tender, + BS	  Skin: No rashes, No ecchymoses, No cyanosis	  Extremities: Normal range of motion, No clubbing, cyanosis or edema  Vascular: Peripheral pulses palpable 2+ bilaterally    LABS:	 	    CBC Full  -  ( 12 Mar 2025 07:24 )  WBC Count : 10.22 K/uL  Hemoglobin : 9.6 g/dL  Hematocrit : 28.3 %  Platelet Count - Automated : 225 K/uL  Mean Cell Volume : 74.5 fl  Mean Cell Hemoglobin : 25.3 pg  Mean Cell Hemoglobin Concentration : 33.9 g/dL  Auto Neutrophil # : x  Auto Lymphocyte # : x  Auto Monocyte # : x  Auto Eosinophil # : x  Auto Basophil # : x  Auto Neutrophil % : x  Auto Lymphocyte % : x  Auto Monocyte % : x  Auto Eosinophil % : x  Auto Basophil % : x    03-12    130[L]  |  92[L]  |  37[H]  ----------------------------<  248[H]  4.4   |  20[L]  |  3.61[H]    Ca    8.6      12 Mar 2025 07:25  Phos  3.3     03-12  Mg     2.0     03-12    TELEMETRY: 	  NSR     TTE:  TRANSTHORACIC ECHOCARDIOGRAM REPORT  Pt. Name:       TOMASZ ALBA Study Date:    3/10/2025  MRN:            [id number]      YOB: 1960  Accession #:    [id number]       Age:           64 years  Account#:       [id number]    Gender:        F  Heart Rate:                     Height:        74.80 in (190.00 cm)  Rhythm:                         Weight:       158.00 lb (71.67 kg)  Blood Pressure: 96/69 mmHg      BSA/BMI:       1.98 m² / 19.85 kg/m²  ________________________________________________________________________________________  Referring Physician:    [id number] Mario Lu  Interpreting Physician: Unruly Wilson M.D.  Primary Sonographer:    Julienne Carpenter RDCS    CPT:               ECHO TTE WO CON COMP W DOPP - 60609.m  Indication(s):     Heart failure, unspecified - I50.9  Procedure:         Transthoracic echocardiogram with 2-D, M-mode and complete                     spectral and color flow Doppler.  Ordering Location: [facility]  Admission Status:  Inpatient  Study Information: Image quality for this study is adequate.     CONCLUSIONS:      1. Left ventricular systolic function is severely decreased with an ejection fraction of 28 % by 3D. Regional wall motion abnormalities present.   2. Multiple segmental abnormalities exist. See findings.   3. Reduced right ventricular systolic function.   4. Compared to the transthoracic echocardiogram performed on 2/19/2025, there have been no significant interval changes.   5. Right pleural effusion noted.    FINDINGS:     Left Ventricle:  Left ventricular systolic function is severely decreased with a calculated ejection fraction of 28 % by 3D. There are regional wall motion abnormalities present.  LV Wall Scoring: The basal and mid anterior septum, basal and mid inferior  septum, basal and mid inferior wall, and mid inferolateral segment are akinetic.  The entire apex, entire anterior wall, basal and mid anterolateral wall, and  basal inferolateral segment are hypokinetic.     Right Ventricle:  The right ventricular cavity is normal in size and right ventricular systolic function is reduced.     Aortic Valve:  The aortic valve appears trileaflet with normal systolic excursion.     Mitral Valve:  There is mild mitral regurgitation.     Tricuspid Valve:  Structurally normal tricuspid valve with normal leaflet excursion. There is mild tricuspid regurgitation.     Pulmonic Valve:  Structurally normal pulmonic valve with normal leaflet excursion.     Pleura:  Right pleural effusion noted.  ____________________________________________________________________  QUANTITATIVE DATA:  Left Ventricle Measurements: (Indexed to BSA)     3D LV EF%: 28 %    Tricuspid Valve Measurements:     TR Vmax:          2.3 m/s  TR Peak Gradient: 20.4 mmHg    ________________________________________________________________________________________  Electronically signed on 3/10/2025 at 2:30:50 PM by Unruly Wilson M.D.     *** Final ***     Cardiac MRI:  ACC: [id number] EXAM:  MR CARD MORPH FUNCTION WAW IC   ORDERED BY: KHAI REEVES     PROCEDURE DATE:  02/19/2025    INTERPRETATION:  HISTORY: Triple-vessel disease on cardiac   catheterization. Viability study. Cardiac catheterization performed   2/17/2025 demonstrated 70% stenosis in the LMCA, severe stenosis in the   LAD, severe stenosis in the LCx, and 100% occlusion in the proximal third   portion of the RCA. Echocardiogram to/10/25 demonstrated mildly dilated   LV with severely depressed systolic function and EF of 20%.    EXAMINATION: Cardiac MRI without and with Gadolinium.    TECHNIQUE: Cardiac MRI was performed before and after the administration   of intravenous gadolinium.    CONTRAST/COMPLICATIONS:  IV Contrast: [id number] cc administered   0 cc discarded  Oral Contrast: NONE  FINDINGS:    Chambers: The left ventricular cavity is normal in size. There is no   significant left ventricular hypertrophy/thickening; for example the   basal anterior wall of the left ventricle measures 8 mm in thickness at   end diastole.    Interventricular septum appears severely hypokinetic/akinetic. Remainder   of the left ventricular myocardium is hypokinetic. The left ventricular   function is depressed.    There is greater than 75 % subendocardial late gadolinium enhancement of   the basal to mid inferior wall (images 4 to 8, sequence 40).    There is about 50% subendocardial late gadolinium enhancement of the   basal inferoseptal wall.    There is transmural late gadolinium enhancement of the apical septal wall   (image 10, sequence 40). There is likely near transmural late gadolinium   enhancement of the apical lateral wall.    There is late gadolinium enhancement involving the anterolateral and   posteromedial papillary muscles.    The left atrium is normal in size.    The right ventricle is normal in size. The right ventricle appears   hypokinetic and function is qualitatively depressed.    The right atrium is normal in size.    LVEDV is 176 mL  Index LVEDV is 97 mL/m2    LVESV is 132 mL  Index LVESV is 73 mL/m2    LVSV is 44 mL    LVEF is 25%    Valves: There is a jet of mitral insufficiency. There is a suspected   small jet of tricuspid insufficiency.    Vessels: Thoracic aorta is normal in caliber.    Thorax: No pericardial effusion. Moderate size right pleural effusion and   small size left pleural effusion. No large mediastinal nodes.    Imaged Abdomen: Unremarkable.    IMPRESSION:.    The left ventricle demonstrates severe wall motion abnormalities and   function is depressed (calculated LVEF is 25%).    There is greater than 75% subendocardial scarring involving the basal to   mid inferior wall of the left ventricle.    There is left ventricular transmural scarring involving the apical septal   wall and likely near transmural scarring of the apical lateral wall.    There is about 50% scarring of the left ventricular basal inferoseptal   wall.    --- End of Report ---   ANA MARIA MYERS MD; Attending Radiologist  This document has been electronically signed. Feb 19 2025 10:16AM  	          CHIEF COMPLAINT: "I have pain in my legs"     HISTORY OF PRESENT ILLNESS: 64 year old Female, hx of CHF (1/16/25 EF 30-35%, grade 2 ddfxn), DM, diabetic foot ulcer with a recent admission at [facility] for CHF exacerbation 1/14-1/17 p/w worsening Right leg pain and fluid secretion, was meeting sepsis criteria.    As per documentation, patient was reported to have severe depletion of RLE blood flow beyond the popliteal vessel at knee and similar findings in the left.  Admitted for continued management of HF exacerbation and needing ischemic eval.  Being treated for JAMEL with Hyponatremia and Metabolic Acidosis and is s/p shiley placement and started on HD 2/20.  S/p LHC with RCA , severe ostial LM disease, diffuse disease in LAD and LCx, some L to R collaterals (Multivessel CAD) and was not a candidate for CABG due to comorbidities.   Cardiac MRI with greater than 75% subendocardial scarring of the basal to mid-inferior wall of the LV and 50% scarring of the left ventricular basal inferoseptal wall. There is also left ventricular transmural scarring involving the apical septal wall and likely near transmural scarring of the apical lateral wall.  On 3/4 underwent high risk PCI with stent to LM/LAD.  EP consulted for bradycardia.       Allergies    No Known Allergies    Intolerances    Augmentin (Stomach Upset; Vomiting; Nausea)  	    MEDICATIONS:  aspirin enteric coated 81 milliGRAM(s) Oral daily  clopidogrel Tablet 75 milliGRAM(s) Oral daily  clopidogrel Tablet      metoprolol succinate ER 25 milliGRAM(s) Oral daily    acetaminophen     Tablet .. 650 milliGRAM(s) Oral every 6 hours PRN  HYDROmorphone  Injectable 1 milliGRAM(s) IV Push every 6 hours PRN  melatonin 3 milliGRAM(s) Oral at bedtime PRN  ondansetron Injectable 4 milliGRAM(s) IV Push every 6 hours PRN    bisacodyl 5 milliGRAM(s) Oral every 12 hours  pantoprazole  Injectable 40 milliGRAM(s) IV Push daily  polyethylene glycol 3350 17 Gram(s) Oral daily  senna 2 Tablet(s) Oral at bedtime    atorvastatin 40 milliGRAM(s) Oral at bedtime  dextrose 50% Injectable 25 Gram(s) IV Push once  dextrose 50% Injectable 12.5 Gram(s) IV Push once  dextrose 50% Injectable 25 Gram(s) IV Push once  dextrose Oral Gel 15 Gram(s) Oral once PRN  glucagon  Injectable 1 milliGRAM(s) IntraMuscular once  insulin glargine Injectable (LANTUS) 15 Unit(s) SubCutaneous at bedtime  insulin lispro (ADMELOG) corrective regimen sliding scale   SubCutaneous three times a day before meals  insulin lispro (ADMELOG) corrective regimen sliding scale   SubCutaneous at bedtime  insulin lispro Injectable (ADMELOG) 6 Unit(s) SubCutaneous three times a day with meals    benzocaine/menthol Lozenge 1 Lozenge Oral once  chlorhexidine 4% Liquid 1 Application(s) Topical <User Schedule>  dextrose 5%. 1000 milliLiter(s) IV Continuous <Continuous>  dextrose 5%. 1000 milliLiter(s) IV Continuous <Continuous>  Nephro-rober 1 Tablet(s) Oral daily  sodium chloride 0.65% Nasal 1 Spray(s) Both Nostrils three times a day PRN  sodium chloride 0.9% lock flush 10 milliLiter(s) IV Push every 1 hour PRN      PAST MEDICAL & SURGICAL HISTORY:  DM (diabetes mellitus)    Diabetic foot ulcer    Congestive heart failure (CHF)    CAD (coronary artery disease)    Cataract of both eyes, unspecified cataract type    History of cholecystectomy    FAMILY HISTORY:  Family history of CHF (congestive heart failure) (Mother)      SOCIAL HISTORY:    [x ] Non-smoker  [ ] Smoker  [x ] Denies Alcohol    REVIEW OF SYSTEMS:  See HPI. Otherwise, 12 point ROS done and otherwise negative.    PHYSICAL EXAM:  T(C): 36.5 (03-13-25 @ 11:18), Max: 37.1 (03-13-25 @ 04:19)  HR: 87 (03-13-25 @ 11:18) (87 - 106)  BP: 98/60 (03-13-25 @ 11:18) (98/60 - 114/75)  RR: 18 (03-13-25 @ 11:18) (16 - 18)  SpO2: 99% (03-13-25 @ 11:18) (91% - 99%)    12 Mar 2025 07:01  -  13 Mar 2025 07:00  --------------------------------------------------------  IN: 565 mL / OUT: 2000 mL / NET: -1435 mL    13 Mar 2025 07:01  -  13 Mar 2025 12:00  --------------------------------------------------------  IN: 125 mL / OUT: 0 mL / NET: 125 mL    Appearance: Normal	  HEENT:   Normal oral mucosa, PERRL, EOMI	  Cardiovascular: Normal S1 S2, regular.    Respiratory: Lungs clear to auscultation	  Psychiatry: A & O x 3, Mood & affect appropriate  Gastrointestinal:  Soft, Non-tender, + BS	  Skin: B/L LE wounds with gauze dressings   Extremities: Normal range of motion, No clubbing, cyanosis or edema  Vascular: Peripheral pulses palpable 2+ bilaterally    LABS:	 	    CBC Full  -  ( 12 Mar 2025 07:24 )  WBC Count : 10.22 K/uL  Hemoglobin : 9.6 g/dL  Hematocrit : 28.3 %  Platelet Count - Automated : 225 K/uL  Mean Cell Volume : 74.5 fl  Mean Cell Hemoglobin : 25.3 pg  Mean Cell Hemoglobin Concentration : 33.9 g/dL  Auto Neutrophil # : x  Auto Lymphocyte # : x  Auto Monocyte # : x  Auto Eosinophil # : x  Auto Basophil # : x  Auto Neutrophil % : x  Auto Lymphocyte % : x  Auto Monocyte % : x  Auto Eosinophil % : x  Auto Basophil % : x    03-12    130[L]  |  92[L]  |  37[H]  ----------------------------<  248[H]  4.4   |  20[L]  |  3.61[H]    Ca    8.6      12 Mar 2025 07:25  Phos  3.3     03-12  Mg     2.0     03-12    TELEMETRY: 	  NSR     TTE:  TRANSTHORACIC ECHOCARDIOGRAM REPORT  Pt. Name:       TOMASZ ALBA Study Date:    3/10/2025  MRN:            [id number]      YOB: 1960  Accession #:    [id number]       Age:           64 years  Account#:       [id number]    Gender:        F  Heart Rate:                     Height:        74.80 in (190.00 cm)  Rhythm:                         Weight:       158.00 lb (71.67 kg)  Blood Pressure: 96/69 mmHg      BSA/BMI:       1.98 m² / 19.85 kg/m²  ________________________________________________________________________________________  Referring Physician:    [id number] Mario Lu  Interpreting Physician: Unruly Wilson M.D.  Primary Sonographer:    Julienne Carpenter RDCS    CPT:               ECHO TTE WO CON COMP W DOPP - 77593.m  Indication(s):     Heart failure, unspecified - I50.9  Procedure:         Transthoracic echocardiogram with 2-D, M-mode and complete                     spectral and color flow Doppler.  Ordering Location: [facility]  Admission Status:  Inpatient  Study Information: Image quality for this study is adequate.     CONCLUSIONS:      1. Left ventricular systolic function is severely decreased with an ejection fraction of 28 % by 3D. Regional wall motion abnormalities present.   2. Multiple segmental abnormalities exist. See findings.   3. Reduced right ventricular systolic function.   4. Compared to the transthoracic echocardiogram performed on 2/19/2025, there have been no significant interval changes.   5. Right pleural effusion noted.    FINDINGS:     Left Ventricle:  Left ventricular systolic function is severely decreased with a calculated ejection fraction of 28 % by 3D. There are regional wall motion abnormalities present.  LV Wall Scoring: The basal and mid anterior septum, basal and mid inferior  septum, basal and mid inferior wall, and mid inferolateral segment are akinetic.  The entire apex, entire anterior wall, basal and mid anterolateral wall, and  basal inferolateral segment are hypokinetic.     Right Ventricle:  The right ventricular cavity is normal in size and right ventricular systolic function is reduced.     Aortic Valve:  The aortic valve appears trileaflet with normal systolic excursion.     Mitral Valve:  There is mild mitral regurgitation.     Tricuspid Valve:  Structurally normal tricuspid valve with normal leaflet excursion. There is mild tricuspid regurgitation.     Pulmonic Valve:  Structurally normal pulmonic valve with normal leaflet excursion.     Pleura:  Right pleural effusion noted.  ____________________________________________________________________  QUANTITATIVE DATA:  Left Ventricle Measurements: (Indexed to BSA)     3D LV EF%: 28 %    Tricuspid Valve Measurements:     TR Vmax:          2.3 m/s  TR Peak Gradient: 20.4 mmHg    ________________________________________________________________________________________  Electronically signed on 3/10/2025 at 2:30:50 PM by Unruly Wilson M.D.     *** Final ***     Cardiac MRI:  ACC: [id number] EXAM:  MR CARD MORPH FUNCTION WAW IC   ORDERED BY: KHAI REEVES     PROCEDURE DATE:  02/19/2025    INTERPRETATION:  HISTORY: Triple-vessel disease on cardiac   catheterization. Viability study. Cardiac catheterization performed   2/17/2025 demonstrated 70% stenosis in the LMCA, severe stenosis in the   LAD, severe stenosis in the LCx, and 100% occlusion in the proximal third   portion of the RCA. Echocardiogram to/10/25 demonstrated mildly dilated   LV with severely depressed systolic function and EF of 20%.    EXAMINATION: Cardiac MRI without and with Gadolinium.    TECHNIQUE: Cardiac MRI was performed before and after the administration   of intravenous gadolinium.    CONTRAST/COMPLICATIONS:  IV Contrast: [id number] cc administered   0 cc discarded  Oral Contrast: NONE  FINDINGS:    Chambers: The left ventricular cavity is normal in size. There is no   significant left ventricular hypertrophy/thickening; for example the   basal anterior wall of the left ventricle measures 8 mm in thickness at   end diastole.    Interventricular septum appears severely hypokinetic/akinetic. Remainder   of the left ventricular myocardium is hypokinetic. The left ventricular   function is depressed.    There is greater than 75 % subendocardial late gadolinium enhancement of   the basal to mid inferior wall (images 4 to 8, sequence 40).    There is about 50% subendocardial late gadolinium enhancement of the   basal inferoseptal wall.    There is transmural late gadolinium enhancement of the apical septal wall   (image 10, sequence 40). There is likely near transmural late gadolinium   enhancement of the apical lateral wall.    There is late gadolinium enhancement involving the anterolateral and   posteromedial papillary muscles.    The left atrium is normal in size.    The right ventricle is normal in size. The right ventricle appears   hypokinetic and function is qualitatively depressed.    The right atrium is normal in size.    LVEDV is 176 mL  Index LVEDV is 97 mL/m2    LVESV is 132 mL  Index LVESV is 73 mL/m2    LVSV is 44 mL    LVEF is 25%    Valves: There is a jet of mitral insufficiency. There is a suspected   small jet of tricuspid insufficiency.    Vessels: Thoracic aorta is normal in caliber.    Thorax: No pericardial effusion. Moderate size right pleural effusion and   small size left pleural effusion. No large mediastinal nodes.    Imaged Abdomen: Unremarkable.    IMPRESSION:.    The left ventricle demonstrates severe wall motion abnormalities and   function is depressed (calculated LVEF is 25%).    There is greater than 75% subendocardial scarring involving the basal to   mid inferior wall of the left ventricle.    There is left ventricular transmural scarring involving the apical septal   wall and likely near transmural scarring of the apical lateral wall.    There is about 50% scarring of the left ventricular basal inferoseptal   wall.    --- End of Report ---   ANA MARIA MYERS MD; Attending Radiologist  This document has been electronically signed. Feb 19 2025 10:16AM

## 2025-03-13 NOTE — PROGRESS NOTE ADULT - SUBJECTIVE AND OBJECTIVE BOX
[facility] NEPHROLOGY- PROGRESS NOTE    64y Female with history of CHF presents as a transfer for LE CO2 angiogram. Nephrology consulted for elevated Scr.      REVIEW OF SYSTEMS:  Gen: no fevers  Cards: no chest pain  Resp: no dyspnea  GI: no nausea or vomiting or diarrhea  Vascular: + LE edema    Augmentin (Stomach Upset; Vomiting; Nausea)  No Known Allergies      Hospital Medications: Medications reviewed        VITALS:  T(F): 97.7 (03-13-25 @ 11:18), Max: 98.7 (03-13-25 @ 04:19)  HR: 88 (03-13-25 @ 12:13)  BP: 106/68 (03-13-25 @ 12:13)  RR: 18 (03-13-25 @ 11:18)  SpO2: 98% (03-13-25 @ 12:13)  Wt(kg): --    03-12 @ 07:01  -  03-13 @ 07:00  --------------------------------------------------------  IN: 565 mL / OUT: 2000 mL / NET: -1435 mL    03-13 @ 07:01  -  03-13 @ 15:53  --------------------------------------------------------  IN: 125 mL / OUT: 0 mL / NET: 125 mL        PHYSICAL EXAM:  Gen: NAD, calm  Cards: RRR, +S1/S2, no M/G/R  Resp: bibasilar rales  GI: soft, NT/ND, NABS  Vascular: + LE wrapped B/L  + RIJ TDC intact        LABS:  03-12    130[L]  |  92[L]  |  37[H]  ----------------------------<  248[H]  4.4   |  20[L]  |  3.61[H]    Ca    8.6      12 Mar 2025 07:25  Phos  3.3     03-12  Mg     2.0     03-12      Creatinine Trend: 3.61 <--, 3.08 <--, 4.40 <--, 3.84 <--, 3.05 <--, 4.46 <--                        9.6    10.22 )-----------( 225      ( 12 Mar 2025 07:24 )             28.3     Urine Studies:  Urinalysis Basic - ( 12 Mar 2025 07:25 )    Color:  / Appearance:  / SG:  / pH:   Gluc: 248 mg/dL / Ketone:   / Bili:  / Urobili:    Blood:  / Protein:  / Nitrite:    Leuk Esterase:  / RBC:  / WBC    Sq Epi:  / Non Sq Epi:  / Bacteria:

## 2025-03-13 NOTE — CONSULT NOTE ADULT - ASSESSMENT
64 year old Female, hx of CHF (1/16/25 EF 30-35%, grade 2 ddfxn), DM, diabetic foot ulcer [facility]1/17 p/w worsening Right leg pain and fluid secretion, was meeting sepsis criteria.    As per documentation, patient was reported to have severe depletion of RLE blood flow beyond the popliteal vessel at knee and similar findings in the left.  Admitted for continued management of HF exacerbation and needing ischemic eval.  Being treated for JAMEL with Hyponatremia and Metabolic Acidosis and is s/p shiley placement and started on HD 2/20.  S/p LHC with RCA , severe ostial LM disease, diffuse disease in LAD and LCx, some L to R collaterals (Multivessel CAD) and was not a candidate for CABG due to comorbidities.   Cardiac MRI with greater than 75% subendocardial scarring of the basal to mid-inferior wall of the LV and 50% scarring of the left ventricular basal inferoseptal wall. There is also left ventricular transmural scarring involving the apical septal wall and likely near transmural scarring of the apical lateral wall.  On 3/4 underwent high risk PCI with stent to LM/LAD.  EP consulted for bradycardia.     1.  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  HFrEF/ Ischemic Cardiomyopathy  3.  CAD with TVD s/p High risk PCI LM/LAD       - Patient was recently started on beta blocker on 3/10 Toprol XL 25mg   - Telemetry review with NSR 80's-100's, with infrequent sinus bradycardia 50's             64 year old Female, hx of CHF (1/16/25 EF 30-35%, grade 2 ddfxn), DM, diabetic foot ulcer with a recent admission at [facility] for CHF exacerbation 1/14-1/17 p/w worsening Right leg pain and fluid secretion, was meeting sepsis criteria.    As per documentation, patient was reported to have severe depletion of RLE blood flow beyond the popliteal vessel at knee and similar findings in the left.  Admitted for continued management of HF exacerbation and needing ischemic eval.  Being treated for JAMEL with Hyponatremia and Metabolic Acidosis and is s/p shiley placement and started on HD 2/20.  S/p LHC with RCA , severe ostial LM disease, diffuse disease in LAD and LCx, some L to R collaterals (Multivessel CAD) and was not a candidate for CABG due to comorbidities.   Cardiac MRI with greater than 75% subendocardial scarring of the basal to mid-inferior wall of the LV and 50% scarring of the left ventricular basal inferoseptal wall. There is also left ventricular transmural scarring involving the apical septal wall and likely near transmural scarring of the apical lateral wall.  On 3/4 underwent high risk PCI with stent to LM/LAD.  EP consulted for bradycardia.     1.  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  HFrEF/ Ischemic Cardiomyopathy  3.  CAD with TVD s/p High risk PCI LM/LAD       - Telemetry review with primarily NSR/ST 80's-100's. Sinus Bradycardia noted on 3/11, infrequent   - Patient was recently started on beta blocker on 3/10.  Okay to continue on Toprol XL 25mg   - GDMT for ischemic CMP. EF 25%  - Reassess LV/EF with Echo 90 days post revascularization to assess for candidacy ICD implantation  - Will arrange for follow up with Dr. Cramer in 3 months  - EP will sign off, reconsult as needed    Yuliana Deluna [facility]  [phone]             64 year old Female, hx of CHF (1/16/25 EF 30-35%, grade 2 ddfxn), DM, diabetic foot ulcer with a recent admission at [facility] for CHF exacerbation 1/14-1/17 p/w worsening Right leg pain and fluid secretion, was meeting sepsis criteria.    As per documentation, patient was reported to have severe depletion of RLE blood flow beyond the popliteal vessel at knee and similar findings in the left.  Admitted for continued management of HF exacerbation and needing ischemic eval.  Being treated for JAMEL with Hyponatremia and Metabolic Acidosis and is s/p shiley placement and started on HD 2/20.  S/p LHC with RCA , severe ostial LM disease, diffuse disease in LAD and LCx, some L to R collaterals (Multivessel CAD) and was not a candidate for CABG due to comorbidities.   Cardiac MRI with greater than 75% subendocardial scarring of the basal to mid-inferior wall of the LV and 50% scarring of the left ventricular basal inferoseptal wall. There is also left ventricular transmural scarring involving the apical septal wall and likely near transmural scarring of the apical lateral wall.  On 3/4 underwent high risk PCI with stent to LM/LAD.  EP consulted for bradycardia.     1.  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  HFrEF/ Ischemic Cardiomyopathy  3.  CAD with TVD s/p High risk PCI LM/LAD   4.  Cardiac MRI with  greater than 75% subendocardial scarring involving the basal to mid inferior wall of the left ventricle.  There is left ventricular transmural scarring involving the apical septal   wall and likely near transmural scarring of the apical lateral wall.  There is about 50% scarring of the left ventricular basal inferoseptal wall.      - Telemetry review with primarily NSR/ST 80's-100's. Sinus Bradycardia noted on 3/11, infrequent   - Patient was recently started on beta blocker on 3/10.  Okay to continue on Toprol XL 25mg   - GDMT for ischemic CMP. EF 25%  - Reassess LV/EF with Echo 90 days post revascularization to assess for candidacy ICD implantation  - Will arrange for follow up with Dr. Cramer in 3 months  - EP will sign off, reconsult as needed    Yuliana Deluna [facility]  [phone]             64 year old Female, hx of CHF (1/16/25 EF 30-35%, grade 2 ddfxn), DM, diabetic foot ulcer with a recent admission at [facility] for CHF exacerbation 1/14-1/17 p/w worsening Right leg pain and fluid secretion, was meeting sepsis criteria.    As per documentation, patient was reported to have severe depletion of RLE blood flow beyond the popliteal vessel at knee and similar findings in the left.  Admitted for continued management of HF exacerbation and needing ischemic eval.  Being treated for JAMEL with Hyponatremia and Metabolic Acidosis and is s/p shiley placement and started on HD 2/20.  S/p LHC with RCA , severe ostial LM disease, diffuse disease in LAD and LCx, some L to R collaterals (Multivessel CAD) and was not a candidate for CABG due to comorbidities.   Cardiac MRI with greater than 75% subendocardial scarring of the basal to mid-inferior wall of the LV and 50% scarring of the left ventricular basal inferoseptal wall. There is also left ventricular transmural scarring involving the apical septal wall and likely near transmural scarring of the apical lateral wall.  On 3/4 underwent high risk PCI with stent to LM/LAD.  EP consulted for bradycardia.     1.  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  HFrEF/ Ischemic Cardiomyopathy  3.  CAD with TVD s/p High risk PCI LM/LAD   4.  Cardiac MRI with  greater than 75% subendocardial scarring involving the basal to mid inferior wall of the left ventricle.  There is left ventricular transmural scarring involving the apical septal   wall and likely near transmural scarring of the apical lateral wall.  There is about 50% scarring of the left ventricular basal inferoseptal wall.      - Telemetry review with primarily NSR/ST 80's-100's. Sinus Bradycardia noted on 3/11, infrequent   No VT or NVST noted on tele review.   - Patient was recently started on beta blocker on 3/10.  Okay to continue on Toprol XL 25mg   - GDMT for ischemic CMP. EF 25%  - Reassess LV/EF with Echo 90 days post revascularization to assess for candidacy ICD implantation  - Will arrange for follow up with Dr. Cramer in 3 months  - EP will sign off, reconsult as needed    Yuliana Deluna [facility]  [phone]

## 2025-03-13 NOTE — PROGRESS NOTE ADULT - PROBLEM SELECTOR PLAN 1
Inpatient Plan:  - Check BG TID AC and HS while on PO diet  - Adjust Lantus to 15u QHS   - C/w Admelog 6u TID AC (HOLD if NPO)  - C/w low dose Admelog correctional scales TID AC and HS    Discharge Planning:   - Likely basal/bolus regimen given kidney function (doses TBD closer to d/c). Not a candidate for SGLT2 due to leg ulcers and also significantly decreased EGFR., can consider GLP1 in addition to Basal/bolus> will need close f/u with Optho due to h/o retinopathy.   Can send Rx for ozempic 0.25mg weekly x 4weeks and increase to 0.50mg, or mounjaro 2.5mg 2.5mg x4 weeks, then increase to 5.0mg weekly. (Patient denies medullary thyroid cancer, hx of pancreatitis or MEN2)  - Please make sure patient has DM management supplies (glucometer, lancets, strips, alcohol pads, insulin pen needles).  - Patient should check BGs before meals and at bedtime. Contact PCP/endocrinology if BG is less than 70 X1, greater than  400 X1 or persistently greater than 200s.   - Endocrine follow up: can f/u with Dr. Grace, Endocrinology.   - Needs Optho/ renal/cardiac /podiatry and vascular follow up

## 2025-03-13 NOTE — PROGRESS NOTE ADULT - SUBJECTIVE AND OBJECTIVE BOX
SURGERY DAILY PROGRESS NOTE    24 Hour/Overnight Events: No acute events overnight    SUBJECTIVE: Patient seen and evaluated on AM rounds.     ------------------------------------------------------------------------------------------------------------  OBJECTIVE:  Vital Signs Last 24 Hrs  T(C): 37.1 (13 Mar 2025 04:19), Max: 37.1 (13 Mar 2025 04:19)  T(F): 98.7 (13 Mar 2025 04:19), Max: 98.7 (13 Mar 2025 04:19)  HR: 100 (13 Mar 2025 04:19) (92 - 106)  BP: 110/73 (13 Mar 2025 04:19) (102/68 - 114/75)  BP(mean): 79 (12 Mar 2025 20:20) (79 - 79)  RR: 18 (13 Mar 2025 04:19) (16 - 18)  SpO2: 95% (13 Mar 2025 04:19) (91% - 98%)    Parameters below as of 13 Mar 2025 04:19  Patient On (Oxygen Delivery Method): room air      I&O's Detail    12 Mar 2025 07:01  -  13 Mar 2025 07:00  --------------------------------------------------------  IN:    Oral Fluid: 565 mL  Total IN: 565 mL    OUT:    Intermittent Catheterization - Urethral (mL): 500 mL    Other (mL): 1500 mL    Voided (mL): 0 mL  Total OUT: 2000 mL    Total NET: -1435 mL          LABS:                        9.6    10.22 )-----------( 225      ( 12 Mar 2025 07:24 )             28.3     03-12    130[L]  |  92[L]  |  37[H]  ----------------------------<  248[H]  4.4   |  20[L]  |  3.61[H]    Ca    8.6      12 Mar 2025 07:25  Phos  3.3     03-12  Mg     2.0     03-12          Urinalysis Basic - ( 12 Mar 2025 07:25 )    Color: x / Appearance: x / SG: x / pH: x  Gluc: 248 mg/dL / Ketone: x  / Bili: x / Urobili: x   Blood: x / Protein: x / Nitrite: x   Leuk Esterase: x / RBC: x / WBC x   Sq Epi: x / Non Sq Epi: x / Bacteria: x    Physical Exam:  General: NAD, resting in bed comfortably  Cardiac: Regular rate, normotensive  Respiratory: Nonlabored respirations, normal cw expansion, on RA  Neuro: AOx3, CNII-XII intact on gross examination  Vascular/Extremities: Warm, well perfused        RLE: Dressings in place, blistering and ulceration on the dorsal aspect of the foot        LLE: First digit dry gangrene on the plantar aspect

## 2025-03-13 NOTE — CONSULT NOTE ADULT - NS ATTEND AMEND GEN_ALL_CORE FT
Patient seen and examined by me. patient care and plan discussed and reviewed with PA. Plan as outlined above edited by me to reflect our discussion. Advanced care planning/advanced directives discussed with patient/family. DNR status including forceful chest compressions to attempt to restart the heart, ventilator support/artificial breathing, electric shock, artificial nutrition, health care proxy, Molst form all discussed with pt. More than 50% of the visit was spent counseling and/or coordinating care by the attending physician.
Asymptomatic sinus bradycardia. Extensive, severe ischemic cardiomyopathy s/p revascularization. Repeat TTE in 3 months for consideration for ICD.
suggest o>I as tolerated  keep sat>90% w o2

## 2025-03-13 NOTE — PROGRESS NOTE ADULT - ASSESSMENT
64y.o. Female with PAD, CLTI affecting b/l LE, CHF, chronic b/l LE wound R worsen then the L, with R foot blistering and ulceration was transfer to [facility] for CO2 angiogram. Patient is currently followed by medicine and cardiology now s/p RLE angiogram with pop and AT angioplasty on 2/24/25.     PLAN:  - Plavix and aspirin  -  Patient with need an AKA this admission, date pending  - Please obtain medical and cardiac clearances   - rest of care per primary    Vascular Surgery   [id number]

## 2025-03-13 NOTE — PROGRESS NOTE ADULT - ASSESSMENT
65 YO F with PMHx of HFrEF 30-35 and grade 2 ddfxn (last TTE on 01/16/25), DM2, and diabetic foot ulcer. Recent admission at [facility] for ADHF. Patient now represents to OSH and ultimately to [facility] as a transfer for worsening right leg pain and fluid secretion. As per documentation, patient was reported to have severe depletion of RLE blood flow beyond the popliteal vessel at knee and similar findings in the left. Patient was transferred to [facility] for CO2 angiogram with Dr. Baltazar. Of note, patient also with reported visual disturbance for which patient was seen and evaluated by opthalmology. Internal Medicine has been consulted on Ms. Kirby's care for medical management.     # ADHF/ BiV HFrEF 20   - Recent admission at [facility] for ADHF and on dobutamine outpatient  - TTE 1/16 with EF 30-35 with moderately reduced LVSF and grade 2 ddfxn, normal RVSF , trace TR/ NV, and trace pericardial effusion  - RPT TTE with EF 20, severely decreased LV, decreased RVSF with TAPSE 1.2, and regional wall motion abnormalities present with entire septum, entire apex, entire inferior wall, mid inferolateral segment, and mid anterolateral segment are hypokinetic.   - RHC with RA 20, PA 49/29 (40), PCWP 35, mVO2 51.1, CO/CI 3.8/2.2, SVR 1095 w/ Multivessel CAD   - s/p zaroxyln 10 QD to augment diuresis   - s/p bumex GTT   - s/p HTS to augment diuresis   - RPT limited TTE w/ LVSF severely decreased, reduced RVSF, LVOT VTI 12, mild to mod TR, and mildly elevated right atrial pressure  - s/p bumex 4 IV QD, but anuric and now dc'ed   - Case discussed with EP and needs to be on GDMT for 3 months before AICD placement and should be stabilized off of inotropic support.    - RPT ECHO post cath with EF 28 %, regional WMA, multiple segmental abnormalities exist, and reduced RVSF    - Case discussed with HF and cards and monitoring off milrinone GTT   - Continue on toprol 25 and hydral on nonHD days  - Continue volume removal with HD   - Monitor I and O   - Monitor volume status   - Check daily weights    - Monitor on telemetry  - Monitor electrolytes   - Cardio, HF, Renal, and EP evals appreciated; F/u recs     # Bradycardia   - SB to 48 BPM at 0400 while asleep on 3/11 and likely related to BB , however last dose prior to event was 3/10 at 0600.  - EP called and pending recommendations   - Monitor telemetry    # Tachycardia and Hypoxia  - Tachycardiac and on RA with SPO2 running 90-92   - Could be second to low cardiac output   - CTA with no PE  - Duplex negative for DVT   - On Toprol XL 25 PO QD.   - Monitor HR   - Monitor closely on Tele  - Cardio eval appreciated; F/u recs    # JAMEL with Hyponatremia and Metabolic Acidosis   - Baseline CRE 0.7-1.2 and increased to ~4  - As per OSH documentation, JAMEL was thought to be second to Unasyn/ Bumex / Entresto use and Hypotension   - US RENAL with no hydronephrosis  - Likely cardiorenal induced with low flow state in ADHF, however now rising again and concern for milrinone vs overdiuresis (prerenal) vs cardiorenal.   - Milrinone adjusted and diuresis turned off, however no improvement/ worsened in renal function noted.   - s/p shiley placement and started on HD 2/20  - s/p permacath 3/11  - Continue on HD per renal  - Avoid nephrotoxic agents  - Monitor Cr and daily BMP   - Renal and HF evals appreciated; F/u recs    # CAD with TVD   - NST abnormal with small-sized, moderate defect in apical wall that is predominantly fixed suggestive of an infarction with minimal marcus-infarct ischemia. Post stress LVEF 25.  - s/p LHC with RCA , severe ostial LM disease, diffuse disease in LAD and LCx, some L to R collaterals (Multivessel CAD)  - Case discussed with CTSx and NOT a candidate for CABG due to comorbidities  - Cardiac MRI with greater than 75% subendocardial scarring of the basal to mid-inferior wall of the LV and 50% scarring of the left ventricular basal inferoseptal wall. There is also left ventricular transmural scarring involving the apical septal wall and likely near transmural scarring of the apical lateral wall.  - s/p RPT LHC 3/4 with LM 80 s/p POBA/ VERA with LM 1, pLAD 90 s/p POBA/ VERA with pLAD 1, and Cx 80 s/p POBA with Cx 10.   - Continue on DAPT and Statin   - IC, Cards and HF following     # BL LE Edema likely in setting of ADHF  - Remove volume as per Cardio, HF and Renal   - BL LE elevation and compression  - LE Duplex neg   - Monitor for now    # Pericardial effusion likely in setting ADHF  - TTE with trace pericardial effusion   - CT Chest with small pericardial effusion   - Denies chest pain, palpitations, chest tightness or discomfort, shortness of breath or dyspnea   - Repeat serial TTE for further evaluation   - Diuresis per cardio/ renal.  - Monitor on telemetry  - Cardio following    # Pleural effusion likely in setting ADHF  - CT Chest with small BL pleural effusions  - Monitor O2 saturation  - Supplement to maintain > 90%   - Diuresis / HD per cardio/ renal.     # Hypernatremia to hyponatremia  - Hypernatremia likely from HTS vs over diuresis/ intravascular dehydration. HTS and diuresis stopped with improved sodium   - Hyponatremia now noted second to volume overload   - Avoid overcorrection > 6-8 mEq in 24 hours  - Monitor sodium with HD    # Transaminitis  - Likely 2/2 hepatic congestion   - Volume removal with HD as tolerated  - Trend LFTs, if uptrend post-HD initiation, check ABD US  - Serial ABD Examinations    # Leukocytosis likely in setting of BL LE ulcers with pop occlusion   - BCx negative   - UCx with probable contamination   - S/P unasyn for ABX.   - Leukocytosis fluctuating, however appears nontoxic with no fever spikes and monitoring closely off ABX  - If febrile check pan cultures   - Trend CBC, temp curve, VS and adjust as tolerated    # Foot ulcers with worsening RLE pain second to pop artery occlusion +/- diabetic ulcers   - RLE Arterial Duplex with popliteal artery occlusion   - LLE Arterial Duplex with underlying disease cannot be excluded   - s/p angio with pop and AT VERA on 2/24   - Continue on Lipitor and DAPT  - Continue pain control with oxy  - Wound care called for dressing recs   - Vascular following,     # Visual Disturbance  - CT Head w/ old infarcts  - On ASA and Statin   - Opthalmology eval appreciated    # Indigestion and Constipation   - PPI, miralax and senna continued  - Monitor BMs    # Anemia likely mixed AOCD vs iron deficiency   - HH stable in 9s on HSQ  - Anemia panel with AOCD   - Started on venofer, but ferritin high and now stopped   - Continue on EPO with HD per renal  - Monitor HH   - Transfuse for Hgb < 8  - Maintain active T/S    # Diabetes Mellitus A1C 11.6   - Continue on lantus 13 with lispro 5 and ISS   - Diabetic DASH diet   - Monitor and adjust glucose levels PRN   - Endocrine following; F/u recs     # HLD and HLD  - Continue on lipitor and toprol  - Monitor BP    # DISPO TBD  - Palliative care called and patient remains FULL CODE     Discussed with Attending  65 YO F with PMHx of HFrEF 30-35 and grade 2 ddfxn (last TTE on 01/16/25), DM2, and diabetic foot ulcer. Recent admission at [facility] for ADHF. Patient now represents to OSH and ultimately to [facility] as a transfer for worsening right leg pain and fluid secretion. As per documentation, patient was reported to have severe depletion of RLE blood flow beyond the popliteal vessel at knee and similar findings in the left. Patient was transferred to [facility] for CO2 angiogram with Dr. Baltazar. Of note, patient also with reported visual disturbance for which patient was seen and evaluated by opthalmology. Internal Medicine has been consulted on Ms. Kirby's care for medical management.     # ADHF/ BiV HFrEF 20   - Recent admission at [facility] for ADHF and on dobutamine outpatient  - TTE 1/16 with EF 30-35 with moderately reduced LVSF and grade 2 ddfxn, normal RVSF , trace TR/ CO, and trace pericardial effusion  - RPT TTE with EF 20, severely decreased LV, decreased RVSF with TAPSE 1.2, and regional wall motion abnormalities present with entire septum, entire apex, entire inferior wall, mid inferolateral segment, and mid anterolateral segment are hypokinetic.   - RHC with RA 20, PA 49/29 (40), PCWP 35, mVO2 51.1, CO/CI 3.8/2.2, SVR 1095 w/ Multivessel CAD   - s/p zaroxyln 10 QD to augment diuresis   - s/p bumex GTT   - s/p HTS to augment diuresis   - RPT limited TTE w/ LVSF severely decreased, reduced RVSF, LVOT VTI 12, mild to mod TR, and mildly elevated right atrial pressure  - s/p bumex 4 IV QD, but anuric and now dc'ed   - Case discussed with EP and needs to be on GDMT for 3 months before AICD placement and should be stabilized off of inotropic support.    - RPT ECHO post cath with EF 28 %, regional WMA, multiple segmental abnormalities exist, and reduced RVSF    - Case discussed with HF and cards and monitoring off milrinone GTT   - Continue on toprol 25 and hydral on nonHD days  - Continue volume removal with HD   - Monitor I and O   - Monitor volume status   - Check daily weights    - Monitor on telemetry  - Monitor electrolytes   - Cardio, HF, Renal, and EP evals appreciated; F/u recs     # Bradycardia   - SB to 48 BPM at 0400 while asleep on 3/11 and likely related to BB , however last dose prior to event was 3/10 at 0600.  - EP called and pending recommendations   - Monitor telemetry    # Tachycardia and Hypoxia  - Tachycardiac and on RA with SPO2 running 90-92   - Could be second to low cardiac output   - CTA with no PE  - Duplex negative for DVT   - On Toprol XL 25 PO QD.   - Monitor HR   - Monitor closely on Tele  - Cardio eval appreciated; F/u recs    # JAMEL with Hyponatremia and Metabolic Acidosis   - Baseline CRE 0.7-1.2 and increased to ~4  - As per OSH documentation, JAMEL was thought to be second to Unasyn/ Bumex / Entresto use and Hypotension   - US RENAL with no hydronephrosis  - Likely cardiorenal induced with low flow state in ADHF, however now rising again and concern for milrinone vs overdiuresis (prerenal) vs cardiorenal.   - Milrinone adjusted and diuresis turned off, however no improvement/ worsened in renal function noted.   - s/p shiley placement and started on HD 2/20  - s/p permacath 3/11  - Continue on HD per renal  - Avoid nephrotoxic agents  - Monitor Cr and daily BMP   - Renal and HF evals appreciated; F/u recs    # CAD with TVD   - NST abnormal with small-sized, moderate defect in apical wall that is predominantly fixed suggestive of an infarction with minimal marcus-infarct ischemia. Post stress LVEF 25.  - s/p LHC with RCA , severe ostial LM disease, diffuse disease in LAD and LCx, some L to R collaterals (Multivessel CAD)  - Case discussed with CTSx and NOT a candidate for CABG due to comorbidities  - Cardiac MRI with greater than 75% subendocardial scarring of the basal to mid-inferior wall of the LV and 50% scarring of the left ventricular basal inferoseptal wall. There is also left ventricular transmural scarring involving the apical septal wall and likely near transmural scarring of the apical lateral wall.  - s/p RPT LHC 3/4 with LM 80 s/p POBA/ VERA with LM 1, pLAD 90 s/p POBA/ VERA with pLAD 1, and Cx 80 s/p POBA with Cx 10.   - Continue on DAPT and Statin   - IC, Cards and HF following     # BL LE Edema likely in setting of ADHF  - Remove volume as per Cardio, HF and Renal   - BL LE elevation and compression  - LE Duplex neg   - Monitor for now    # Pericardial effusion likely in setting ADHF  - TTE with trace pericardial effusion   - CT Chest with small pericardial effusion   - Denies chest pain, palpitations, chest tightness or discomfort, shortness of breath or dyspnea   - Repeat serial TTE for further evaluation   - Diuresis per cardio/ renal.  - Monitor on telemetry  - Cardio following    # Pleural effusion likely in setting ADHF  - CT Chest with small BL pleural effusions  - Monitor O2 saturation  - Supplement to maintain > 90%   - Diuresis / HD per cardio/ renal.     # Hypernatremia to hyponatremia  - Hypernatremia likely from HTS vs over diuresis/ intravascular dehydration. HTS and diuresis stopped with improved sodium   - Hyponatremia now noted second to volume overload   - Avoid overcorrection > 6-8 mEq in 24 hours  - Monitor sodium with HD    # Transaminitis  - Likely 2/2 hepatic congestion   - Volume removal with HD as tolerated  - Trend LFTs, if uptrend post-HD initiation, check ABD US  - Serial ABD Examinations    # Leukocytosis likely in setting of BL LE ulcers with pop occlusion   - BCx negative   - UCx with probable contamination   - S/P unasyn for ABX.   - Leukocytosis fluctuating, however appears nontoxic with no fever spikes and monitoring closely off ABX  - If febrile check pan cultures   - Trend CBC, temp curve, VS and adjust as tolerated    # Foot ulcers with worsening RLE pain second to pop artery occlusion +/- diabetic ulcers   - RLE Arterial Duplex with popliteal artery occlusion   - LLE Arterial Duplex with underlying disease cannot be excluded   - s/p angio with pop and AT VERA on 2/24   - c/b necrosis of RLE   - Pending vascular AKA  - Continue on Lipitor and DAPT  - Continue pain control with oxy  - Wound care called for dressing recs   - Vascular following,     # Visual Disturbance  - CT Head w/ old infarcts  - On ASA and Statin   - Opthalmology eval appreciated    # Indigestion and Constipation   - PPI, miralax and senna continued  - Monitor BMs    # Anemia likely mixed AOCD vs iron deficiency   - HH stable in 9s on HSQ  - Anemia panel with AOCD   - Started on venofer, but ferritin high and now stopped   - Continue on EPO with HD per renal  - Monitor HH   - Transfuse for Hgb < 8  - Maintain active T/S    # Diabetes Mellitus A1C 11.6   - Continue on lantus 13 with lispro 5 and ISS   - Diabetic DASH diet   - Monitor and adjust glucose levels PRN   - Endocrine following; F/u recs     # HLD and HLD  - Continue on lipitor and toprol  - Monitor BP    # DISPO TBD  - Palliative care called and patient remains FULL CODE     Discussed with Attending

## 2025-03-13 NOTE — PROGRESS NOTE ADULT - SUBJECTIVE AND OBJECTIVE BOX
MR#[id number]  PATIENT NAME:COLE ALBA    DATE OF SERVICE: 03-13-25 @ 06:32  Patient was seen and examined by Yordy Gilmore MD on    03-13-25 @ 06:32 .  Interim events noted.Consultant notes ,Labs,Telemetry reviewed by me       HOSPITAL COURSE: HPI:  64F, hx of CHF (last TTE on 1/16/25 EF 30-35%, G2DD), DM, diabetic foot ulcer with a recent admission at [facility] for CHF exacerbation presented as a transfer for worsening R. leg pain and fluid secretion, On non-invasive imaging demonstrating severe depletion of RLE blood flow beyond the popliteal vessel at knee and similar findings in L. Was transferred to [facility] for CO2 angiogram with Dr. Baltazar. (29 Jan 2025 20:05)      INTERIM EVENTS:Patient seen at bedside ,interim events noted.    02/14-Had cath Multivessel CAD started on Milrinone for CTS evaluation albeit targets not optimal  02/15-Awake on Milrinone and Bumex gtt-seen by CTS not a surgical candidate-needs Vascular work-up for LE PAD-scheduled for Angiogram on Wednesday    Weight 170Kg---> 164 Kg--->161.1 Kg---151.2 ---> 155 --> 154.7 ---> 158 --> 148--->147 >148 --> 146 --> 141---> 149  Kg    02/25-s/p RLE angiogram with pop and AT angioplasty   02/27-Awake had iHD plan for High risk PCI oLM/LAD tomorrow 1000mL removed  Had ? pAF ~~3 secs  02/28-Awake no dyspnea chest pain plan for PCI-oLM/LAD todat HD after PCI-Sinus rhythm  03/01-Had LHC PCWP-15 still elevated with low BP Plan to dialyse over weekend and plan for PCI on Monday-No dyspnea  03/02-Awake had HD removed 1500Ml no dyspnea Plan for PCI tomorrow  03/03-Awake Sinus rhythm no dyspnea FOR High risk PCI oLM/LAD   03/04-Awake had HD last night-1500mL removed For High risk PCI today  03/05-Awake s/p LHC VERA x 1 to LAD, VERA to LM , POBA to CX via RFA  03/06-Awake no dyspnea chest pain  03/07-Awake poor UOP plan to continue HD  03/08-Awake Sinus Rhythm CTPA No PE Had HD 1500mL removed  03/09 Awake no dyspnea Sinus Rhythm-Had HD 2900  mL removed noted to be Tachy -Sinus~~120's  03/10-Awake not orthopneac less Tachy~~100's  03/11-Awake Had HD 1500mL removed episode sinus bradycardia  03/12-Awake had Tunnelled HD catheter placed  03/13-Had HD 1500mL noted barrington episodes Sinus at rest           PMH -reviewed admission note, no change since admission  HEART FAILURE: Acute[x ]Chronic[ ] Systolic[x ] Diastolic[ ] Combined Systolic and Diastolic[ ]  CAD[x ] CABG[ ] PCI[ ]  DEVICES[ ] PPM[ ] ICD[ ] ILR[ ]  ATRIAL FIBRILLATION[ ] Paroxysmal[ ] Permanent[ ] CHADS2-[  ]  JAMEL[x ] CKD1[ ] CKD2[ ] CKD3[ ] CKD4[x ] ESRD[ ]  COPD[ ] HTN[x ]   DM[x ] Type1[ ] Type 2[x ]   CVA[ ] Paresis[ ]    AMBULATION: Assisted[x ] Cane/walker[ ] Independent[ ]MEDICATIONS  (STANDING):  aspirin enteric coated 81 milliGRAM(s) Oral daily  atorvastatin 40 milliGRAM(s) Oral at bedtime  benzocaine/menthol Lozenge 1 Lozenge Oral once  bisacodyl 5 milliGRAM(s) Oral every 12 hours  chlorhexidine 4% Liquid 1 Application(s) Topical <User Schedule>  clopidogrel Tablet 75 milliGRAM(s) Oral daily  clopidogrel Tablet      glucagon  Injectable 1 milliGRAM(s) IntraMuscular once  insulin glargine Injectable (LANTUS) 14 Unit(s) SubCutaneous at bedtime  insulin lispro (ADMELOG) corrective regimen sliding scale   SubCutaneous three times a day before meals  insulin lispro (ADMELOG) corrective regimen sliding scale   SubCutaneous at bedtime  insulin lispro Injectable (ADMELOG) 6 Unit(s) SubCutaneous three times a day with meals  metoprolol succinate ER 25 milliGRAM(s) Oral daily  Nephro-rober 1 Tablet(s) Oral daily  pantoprazole  Injectable 40 milliGRAM(s) IV Push daily  polyethylene glycol 3350 17 Gram(s) Oral daily  senna 2 Tablet(s) Oral at bedtime    MEDICATIONS  (PRN):  acetaminophen     Tablet .. 650 milliGRAM(s) Oral every 6 hours PRN Mild Pain (1 - 3)  dextrose Oral Gel 15 Gram(s) Oral once PRN Blood Glucose LESS THAN 70 milliGRAM(s)/deciliter  HYDROmorphone  Injectable 1 milliGRAM(s) IV Push every 6 hours PRN Severe Pain (7 - 10)  melatonin 3 milliGRAM(s) Oral at bedtime PRN Insomnia  ondansetron Injectable 4 milliGRAM(s) IV Push every 6 hours PRN Nausea and/or Vomiting  sodium chloride 0.65% Nasal 1 Spray(s) Both Nostrils three times a day PRN Dryness  sodium chloride 0.9% lock flush 10 milliLiter(s) IV Push every 1 hour PRN Pre/post blood products, medications, blood draw, and to maintain line patency            REVIEW OF SYSTEMS:  Constitutional: [ ] fever, [ ]weight loss,  [ x]fatigue [ ]weight gain  Eyes: [ ] visual changes  Respiratory: [ ]shortness of breath;  [ ] cough, [ ]wheezing, [ ]chills, [ ]hemoptysis  Cardiovascular: [ ] chest pain, [ ]palpitations, [ ]dizziness,  [ ]leg swelling[ ]orthopnea[ ]PND  Gastrointestinal: [ ] abdominal pain, [ ]nausea, [ ]vomiting,  [ ]diarrhea [ ]Constipation [ ]Melena  Genitourinary: [ ] dysuria, [ ] hematuria [ ]Montgomery  Neurologic: [ ] headaches [ ] tremors[ ]weakness [ ]Paralysis Right[ ] Left[ ]  Skin: [ ] itching, [ ]burning, [ ] rashes  Endocrine: [ ] heat or cold intolerance  Musculoskeletal: [ ] joint pain or swelling; [ ] muscle, back, or extremity pain  Psychiatric: [ ] depression, [ ]anxiety, [ ]mood swings, or [ ]difficulty sleeping  Hematologic: [ ] easy bruising, [ ] bleeding gums    [ ] All remaining systems negative except as per above.   [ ]Unable to obtain.  [x] No change in ROS since admission      Vital Signs Last 24 Hrs  T(C): 37.1 (13 Mar 2025 04:19), Max: 37.1 (13 Mar 2025 04:19)  T(F): 98.7 (13 Mar 2025 04:19), Max: 98.7 (13 Mar 2025 04:19)  HR: 100 (13 Mar 2025 04:19) (92 - 106)  BP: 110/73 (13 Mar 2025 04:19) (102/68 - 114/75)  BP(mean): 79 (12 Mar 2025 20:20) (79 - 79)  RR: 18 (13 Mar 2025 04:19) (16 - 18)  SpO2: 95% (13 Mar 2025 04:19) (91% - 98%)    Parameters below as of 13 Mar 2025 04:19  Patient On (Oxygen Delivery Method): room air      I&O's Summary    12 Mar 2025 07:01  -  13 Mar 2025 06:32  --------------------------------------------------------  IN: 565 mL / OUT: 2000 mL / NET: -1435 mL        PHYSICAL EXAM:  General: No acute distress BMI-28  HEENT: EOMI, PERRL  Neck: Supple, [ ] JVD  Lungs: Equal air entry bilaterally; [ ] rales [ ] wheezing [ ] rhonchi  Heart: Regular rate and rhythm; [x ] murmur   2/6 [ x] systolic [ ] diastolic [ ] radiation[ ] rubs [ ]  gallops  Abdomen: Nontender, bowel sounds present  Extremities: No clubbing, cyanosis, [x ] edema [x ]Warm, well perfused        RLE: Dressings in place, blistering and ulceration on the dorsal aspect of the foot        LLE: First digit dry gangrene on the plantar aspect  Nervous system:  Alert & Oriented X3, no focal deficits  Psychiatric: Normal affect  Skin: No rashes or lesions    LABS:  03-12    130[L]  |  92[L]  |  37[H]  ----------------------------<  248[H]  4.4   |  20[L]  |  3.61[H]    Ca    8.6      12 Mar 2025 07:25  Phos  3.3     03-12  Mg     2.0     03-12      Creatinine Trend: 3.61<--, 3.08<--, 4.40<--, 3.84<--, 3.05<--, 4.46<--                        9.6    10.22 )-----------( 225      ( 12 Mar 2025 07:24 )             28.3         Hepatitis C Antibody Test (03.13.25 @ 05:53)   Hepatitis C Virus S/CO Ratio: 0.06 S/CO  Hepatitis C Virus Interpretation: Nonreact:   INTERPRETATIVE COMMENT Non-Reactive      TTE W or WO Ultrasound Enhancing Agent (03.10.25 @ 12:48) >  CONCLUSIONS:      1. Left ventricular systolic function is severely decreased with an ejection fraction of 28 % by 3D. Regional wall motion abnormalities present.   2. Multiple segmental abnormalities exist.The basal and mid anterior septum, basal and mid inferior septum, basal and mid inferior wall, and mid inferolateral segment are akinetic.  The entire apex, entire anterior wall, basal and mid anterolateral wall, and basal inferolateral segment are hypokinetic.   3. Reduced right ventricular systolic function.   4. Compared to the transthoracic echocardiogram performed on 2/19/2025, there have been no significant interval changes.   5. Right pleural effusion noted.       VA Duplex Lower Ext Vein Scan, Bilat (03.10.25 @ 13:55) >    IMPRESSION:  No evidence of deep venous thrombosis in either lower extremity.   MR#[id number]  PATIENT NAME:COLE ALBA    DATE OF SERVICE: 03-13-25 @ 06:32  Patient was seen and examined by Yordy Gilmore MD on    03-13-25 @ 06:32 .  Interim events noted.Consultant notes ,Labs,Telemetry reviewed by me       HOSPITAL COURSE: HPI:  64F, hx of CHF (last TTE on 1/16/25 EF 30-35%, G2DD), DM, diabetic foot ulcer with a recent admission at [facility] for CHF exacerbation presented as a transfer for worsening R. leg pain and fluid secretion, On non-invasive imaging demonstrating severe depletion of RLE blood flow beyond the popliteal vessel at knee and similar findings in L. Was transferred to [facility] for CO2 angiogram with Dr. Baltazar. (29 Jan 2025 20:05)      INTERIM EVENTS:Patient seen at bedside ,interim events noted.    02/14-Had cath Multivessel CAD started on Milrinone for CTS evaluation albeit targets not optimal  02/15-Awake on Milrinone and Bumex gtt-seen by CTS not a surgical candidate-needs Vascular work-up for LE PAD-scheduled for Angiogram on Wednesday    Weight 170Kg---> 164 Kg--->161.1 Kg---151.2 ---> 155 --> 154.7 ---> 158 --> 148--->147 >148 --> 146 --> 141---> 149  Kg    02/25-s/p RLE angiogram with pop and AT angioplasty   02/27-Awake had iHD plan for High risk PCI oLM/LAD tomorrow 1000mL removed  Had ? pAF ~~3 secs  02/28-Awake no dyspnea chest pain plan for PCI-oLM/LAD todat HD after PCI-Sinus rhythm  03/01-Had LHC PCWP-15 still elevated with low BP Plan to dialyse over weekend and plan for PCI on Monday-No dyspnea  03/02-Awake had HD removed 1500Ml no dyspnea Plan for PCI tomorrow  03/03-Awake Sinus rhythm no dyspnea FOR High risk PCI oLM/LAD   03/04-Awake had HD last night-1500mL removed For High risk PCI today  03/05-Awake s/p LHC VERA x 1 to LAD, VERA to LM , POBA to CX via RFA  03/06-Awake no dyspnea chest pain  03/07-Awake poor UOP plan to continue HD  03/08-Awake Sinus Rhythm CTPA No PE Had HD 1500mL removed  03/09 Awake no dyspnea Sinus Rhythm-Had HD 2900  mL removed noted to be Tachy -Sinus~~120's  03/10-Awake not orthopneac less Tachy~~100's  03/11-Awake Had HD 1500mL removed episode sinus bradycardia  03/12-Awake had Tunnelled HD catheter placed  03/13-Had HD 1500mL noted barrington episodes Sinus at rest           PMH -reviewed admission note, no change since admission  HEART FAILURE: Acute[x ]Chronic[ ] Systolic[x ] Diastolic[ ] Combined Systolic and Diastolic[ ]  CAD[x ] CABG[ ] PCI[ ]  DEVICES[ ] PPM[ ] ICD[ ] ILR[ ]  ATRIAL FIBRILLATION[ ] Paroxysmal[ ] Permanent[ ] CHADS2-[  ]  JAMEL[x ] CKD1[ ] CKD2[ ] CKD3[ ] CKD4[x ] ESRD[ ]  COPD[ ] HTN[x ]   DM[x ] Type1[ ] Type 2[x ]   CVA[ ] Paresis[ ]    AMBULATION: Assisted[x ] Cane/walker[ ] Independent[ ]MEDICATIONS  (STANDING):  aspirin enteric coated 81 milliGRAM(s) Oral daily  atorvastatin 40 milliGRAM(s) Oral at bedtime  benzocaine/menthol Lozenge 1 Lozenge Oral once  bisacodyl 5 milliGRAM(s) Oral every 12 hours  chlorhexidine 4% Liquid 1 Application(s) Topical <User Schedule>  clopidogrel Tablet 75 milliGRAM(s) Oral daily  clopidogrel Tablet      glucagon  Injectable 1 milliGRAM(s) IntraMuscular once  insulin glargine Injectable (LANTUS) 14 Unit(s) SubCutaneous at bedtime  insulin lispro (ADMELOG) corrective regimen sliding scale   SubCutaneous three times a day before meals  insulin lispro (ADMELOG) corrective regimen sliding scale   SubCutaneous at bedtime  insulin lispro Injectable (ADMELOG) 6 Unit(s) SubCutaneous three times a day with meals  metoprolol succinate ER 25 milliGRAM(s) Oral daily  Nephro-rober 1 Tablet(s) Oral daily  pantoprazole  Injectable 40 milliGRAM(s) IV Push daily  polyethylene glycol 3350 17 Gram(s) Oral daily  senna 2 Tablet(s) Oral at bedtime    MEDICATIONS  (PRN):  acetaminophen     Tablet .. 650 milliGRAM(s) Oral every 6 hours PRN Mild Pain (1 - 3)  dextrose Oral Gel 15 Gram(s) Oral once PRN Blood Glucose LESS THAN 70 milliGRAM(s)/deciliter  HYDROmorphone  Injectable 1 milliGRAM(s) IV Push every 6 hours PRN Severe Pain (7 - 10)  melatonin 3 milliGRAM(s) Oral at bedtime PRN Insomnia  ondansetron Injectable 4 milliGRAM(s) IV Push every 6 hours PRN Nausea and/or Vomiting  sodium chloride 0.65% Nasal 1 Spray(s) Both Nostrils three times a day PRN Dryness  sodium chloride 0.9% lock flush 10 milliLiter(s) IV Push every 1 hour PRN Pre/post blood products, medications, blood draw, and to maintain line patency            REVIEW OF SYSTEMS:  Constitutional: [ ] fever, [ ]weight loss,  [ x]fatigue [ ]weight gain  Eyes: [ ] visual changes  Respiratory: [ ]shortness of breath;  [ ] cough, [ ]wheezing, [ ]chills, [ ]hemoptysis  Cardiovascular: [ ] chest pain, [ ]palpitations, [ ]dizziness,  [ ]leg swelling[ ]orthopnea[ ]PND  Gastrointestinal: [ ] abdominal pain, [ ]nausea, [ ]vomiting,  [ ]diarrhea [ ]Constipation [ ]Melena  Genitourinary: [ ] dysuria, [ ] hematuria [ ]Montgomery  Neurologic: [ ] headaches [ ] tremors[ ]weakness [ ]Paralysis Right[ ] Left[ ]  Skin: [ ] itching, [ ]burning, [ ] rashes  Endocrine: [ ] heat or cold intolerance  Musculoskeletal: [ ] joint pain or swelling; [ ] muscle, back, or extremity pain  Psychiatric: [ ] depression, [ ]anxiety, [ ]mood swings, or [ ]difficulty sleeping  Hematologic: [ ] easy bruising, [ ] bleeding gums    [ ] All remaining systems negative except as per above.   [ ]Unable to obtain.  [x] No change in ROS since admission      Vital Signs Last 24 Hrs  T(C): 37.1 (13 Mar 2025 04:19), Max: 37.1 (13 Mar 2025 04:19)  T(F): 98.7 (13 Mar 2025 04:19), Max: 98.7 (13 Mar 2025 04:19)  HR: 100 (13 Mar 2025 04:19) (92 - 106)  BP: 110/73 (13 Mar 2025 04:19) (102/68 - 114/75)  BP(mean): 79 (12 Mar 2025 20:20) (79 - 79)  RR: 18 (13 Mar 2025 04:19) (16 - 18)  SpO2: 95% (13 Mar 2025 04:19) (91% - 98%)    Parameters below as of 13 Mar 2025 04:19  Patient On (Oxygen Delivery Method): room air      I&O's Summary    12 Mar 2025 07:01  -  13 Mar 2025 06:32  --------------------------------------------------------  IN: 565 mL / OUT: 2000 mL / NET: -1435 mL        PHYSICAL EXAM:  General: No acute distress BMI-28  HEENT: EOMI, PERRL  Neck: Supple, [ ] JVD  Lungs: Equal air entry bilaterally; [ ] rales [ ] wheezing [ ] rhonchi  Heart: Regular rate and rhythm; [x ] murmur   2/6 [ x] systolic [ ] diastolic [ ] radiation[ ] rubs [ ]  gallops  Abdomen: Nontender, bowel sounds present  Extremities: No clubbing, cyanosis, [x ] edema [x]Bilateral lower extremity venous stasis wounds to dermis, mild serous drainage, no acute signs of infection. Left foot hallux distal tuft well adhered eschar, no acute signs of infection. - RLE extensive gangrnous changes to foot dorsum, ankle, heel dorsomedial and lateral lower leg, ischemic changes to 2nd digit and hallux,  Left foot hallux distal tuft well adhered eschar, no acute signs of infection.    Nervous system:  Alert & Oriented X3, no focal deficits  Psychiatric: Normal affect  Skin: No rashes or lesions    LABS:  03-12    130[L]  |  92[L]  |  37[H]  ----------------------------<  248[H]  4.4   |  20[L]  |  3.61[H]    Ca    8.6      12 Mar 2025 07:25  Phos  3.3     03-12  Mg     2.0     03-12      Creatinine Trend: 3.61<--, 3.08<--, 4.40<--, 3.84<--, 3.05<--, 4.46<--                        9.6    10.22 )-----------( 225      ( 12 Mar 2025 07:24 )             28.3         Hepatitis C Antibody Test (03.13.25 @ 05:53)   Hepatitis C Virus S/CO Ratio: 0.06 S/CO  Hepatitis C Virus Interpretation: Nonreact:   INTERPRETATIVE COMMENT Non-Reactive      TTE W or WO Ultrasound Enhancing Agent (03.10.25 @ 12:48) >  CONCLUSIONS:      1. Left ventricular systolic function is severely decreased with an ejection fraction of 28 % by 3D. Regional wall motion abnormalities present.   2. Multiple segmental abnormalities exist.The basal and mid anterior septum, basal and mid inferior septum, basal and mid inferior wall, and mid inferolateral segment are akinetic.  The entire apex, entire anterior wall, basal and mid anterolateral wall, and basal inferolateral segment are hypokinetic.   3. Reduced right ventricular systolic function.   4. Compared to the transthoracic echocardiogram performed on 2/19/2025, there have been no significant interval changes.   5. Right pleural effusion noted.       VA Duplex Lower Ext Vein Scan, Bilat (03.10.25 @ 13:55) >    IMPRESSION:  No evidence of deep venous thrombosis in either lower extremity.

## 2025-03-13 NOTE — PROGRESS NOTE ADULT - SUBJECTIVE AND OBJECTIVE BOX
INTERNAL MEDICINE PROGRESS NOTE     NAME OF PATIENT: TOMASZ ALBA  MRN: [id number]  DATE OF VISIT: 03-13-25 @ 13:21    SUBJECTIVE/ ROS:  - Patient seen and examined by bedside     OBJECTIVE:  ICU Vital Signs Last 24 Hrs  T(C): 36.5 (13 Mar 2025 11:18), Max: 37.1 (13 Mar 2025 04:19)  T(F): 97.7 (13 Mar 2025 11:18), Max: 98.7 (13 Mar 2025 04:19)  HR: 88 (13 Mar 2025 12:13) (87 - 106)  BP: 106/68 (13 Mar 2025 12:13) (98/60 - 114/75)  BP(mean): 79 (12 Mar 2025 20:20) (79 - 79)  ABP: --  ABP(mean): --  RR: 18 (13 Mar 2025 11:18) (18 - 18)  SpO2: 98% (13 Mar 2025 12:13) (91% - 99%)    O2 Parameters below as of 13 Mar 2025 12:13  Patient On (Oxygen Delivery Method): room air          03-13-25 @ 13:21  T(C): 36.5 (03-13-25 @ 11:18), Max: 37.1 (03-13-25 @ 04:19)  HR: 88 (03-13-25 @ 12:13) (87 - 106)  BP: 106/68 (03-13-25 @ 12:13) (98/60 - 114/75)  RR: 18 (03-13-25 @ 11:18) (18 - 18)  SpO2: 98% (03-13-25 @ 12:13) (91% - 99%)  Wt(kg): --  CAPILLARY BLOOD GLUCOSE      POCT Blood Glucose.: 232 mg/dL (13 Mar 2025 11:15)      HOSPITAL MEDICATIONS:  MEDICATIONS  (STANDING):  aspirin enteric coated 81 milliGRAM(s) Oral daily  atorvastatin 40 milliGRAM(s) Oral at bedtime  benzocaine/menthol Lozenge 1 Lozenge Oral once  bisacodyl 5 milliGRAM(s) Oral every 12 hours  chlorhexidine 4% Liquid 1 Application(s) Topical <User Schedule>  clopidogrel Tablet 75 milliGRAM(s) Oral daily  clopidogrel Tablet      dextrose 5%. 1000 milliLiter(s) (100 mL/Hr) IV Continuous <Continuous>  dextrose 5%. 1000 milliLiter(s) (50 mL/Hr) IV Continuous <Continuous>  dextrose 50% Injectable 25 Gram(s) IV Push once  dextrose 50% Injectable 12.5 Gram(s) IV Push once  dextrose 50% Injectable 25 Gram(s) IV Push once  glucagon  Injectable 1 milliGRAM(s) IntraMuscular once  insulin glargine Injectable (LANTUS) 15 Unit(s) SubCutaneous at bedtime  insulin lispro (ADMELOG) corrective regimen sliding scale   SubCutaneous three times a day before meals  insulin lispro (ADMELOG) corrective regimen sliding scale   SubCutaneous at bedtime  insulin lispro Injectable (ADMELOG) 6 Unit(s) SubCutaneous three times a day with meals  metoprolol succinate ER 25 milliGRAM(s) Oral daily  Nephro-rober 1 Tablet(s) Oral daily  pantoprazole  Injectable 40 milliGRAM(s) IV Push daily  polyethylene glycol 3350 17 Gram(s) Oral daily  senna 2 Tablet(s) Oral at bedtime    MEDICATIONS  (PRN):  acetaminophen     Tablet .. 650 milliGRAM(s) Oral every 6 hours PRN Mild Pain (1 - 3)  dextrose Oral Gel 15 Gram(s) Oral once PRN Blood Glucose LESS THAN 70 milliGRAM(s)/deciliter  HYDROmorphone  Injectable 1 milliGRAM(s) IV Push every 6 hours PRN Severe Pain (7 - 10)  melatonin 3 milliGRAM(s) Oral at bedtime PRN Insomnia  ondansetron Injectable 4 milliGRAM(s) IV Push every 6 hours PRN Nausea and/or Vomiting  sodium chloride 0.65% Nasal 1 Spray(s) Both Nostrils three times a day PRN Dryness  sodium chloride 0.9% lock flush 10 milliLiter(s) IV Push every 1 hour PRN Pre/post blood products, medications, blood draw, and to maintain line patency      PHYSICAL EXAMINATION:  General: NAD   Cardiology: S1/S2 with no murmur   Respiratory: CTA BL with no wheeze   GI: Soft and NTND  Extremities: JOSE L of BL UE/LE   Neurology: Awake with no acute neurological deficits     LABS:                        9.6    10.22 )-----------( 225      ( 12 Mar 2025 07:24 )             28.3     03-12    130[L]  |  92[L]  |  37[H]  ----------------------------<  248[H]  4.4   |  20[L]  |  3.61[H]    Ca    8.6      12 Mar 2025 07:25  Phos  3.3     03-12  Mg     2.0     03-12            MICROBIOLOGY:     RADIOLOGY:    CARDIOLOGY:

## 2025-03-13 NOTE — PROGRESS NOTE ADULT - ASSESSMENT
64y Female with history of CHF presents as a transfer for LE CO2 angiogram. Nephrology consulted for elevated Scr.    1) JAMEL: likely due to ATN and CRS for which patient initiated on RRT on 2/20. JAMEL exacerbated by contrast nephropathy with last HD on 3/12 tolerated well with 1.5L removed. Plan for additional HD on 3/14. S/P TDC placement on 3/11. S/P straight cath for urinary retention. Monitor serial bladder scans (positive overnight). Patient wishes to be discharged to [facility] with onsite HD. Avoid nephrotoxins.    2) HTN: BP low normal. On midodrine pre-HD to avoid intradialytic hypotension.    3) LE edema: Off diuretics. UF with HD. TTE with severely decreased LVSF. Monitor UO.     4) Anemia: Hb improving with low TSAT. No IV iron given elevated ferritin. S/P Epo 8K X 1 dose 3/12. Monitor Hb.    5) Hyponatremia: Secondary to JAMEL and polydipsia. High dialysate Na bath. Continue with 1L FR.      [facility] NEPHROLOGY  Raji Waterman M.D.  Mars Feliz D.O.  Kelley Parks M.D.  MD Nancy Kulkarni, MSN, ANP-C    Telephone: [phone]  Facsimile: [phone] [place] [address], [id number]  [place] [address]

## 2025-03-13 NOTE — PROGRESS NOTE ADULT - ASSESSMENT
63y/o F w/h/o uncontrolled T2DM (A1C 11.6%) on Tresiba and CeQur insulin patch PTA. DM c/b PAD, retinopathy, CKD, CAD. Also h/oType 2 DM, HTN, HLD. Presented to OSH with worsening right leg pain and fluid secretion. Transferred to [facility] for CO2 angiogram. Found to have Low EF to 20% in OSBALDO. s/p high risk PCI on 3/3. Endocrine consulted for Type 2 DM management, uncontrolled. Patient is s/p PermCath placement. Reports feeling good, eating full meals and tolerating POs. FBG slightly elevated this am, will increase Lantus to 15u QHS. No hypoglycemia. Better postprandial BG control. will keep mealtime dose as is for now. Endocrine will closely monitor BG and adjust insulin as needed for BG goal 100-180mg/dL inpatient.        #uncontrolled Type 2 diabetes mellitus   A1C with Estimated Average Glucose Result: 11.6 % (01-24-25 @ 05:40)  A1C with Estimated Average Glucose Result: >15.5 % (12-13-24 @ 06:49)    Home regimen: Tresiba 40 units, CeQur insulin patch about 2-10 units TID with meals

## 2025-03-13 NOTE — PROGRESS NOTE ADULT - ASSESSMENT
64F, hx of CHF (last TTE on 1/16/25 EF 30-35%, G2DD), DM, diabetic foot ulcer with a recent admission at [facility] for CHF exacerbation 1/14-1/17 p/w worsening R. leg pain and fluid secretion, was meeting sepsis criteria with podiatry having low suspicion for right cellulitis and admitted for continued management of HF exacerbation and needing ischemic eval.     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  -Left leg Arterial Duplex: Slightly tardus parvus waveform of the left popliteal artery is noted, for which underlying disease cannot be excluded. Posterior tibial artery waveform is nonpulsatile. Anterior tibial artery tardus parvus flow is noted.   Transferred to [facility] for CO2 angiogram as per vascular surgery    #  PAD Wound of lower extremity.   ·  Plan: Podiatry following, b/l serous bullae, no concerns for infection  Found to have RLE coolness  -Right Leg Arterial Duplex: Popliteal artery is occluded with negligible flow of right trifurcation arteries.  -Left leg Arterial Duplex: Slightly tardus parvus waveform of the left popliteal artery is noted, for which underlying disease cannot be excluded. Posterior tibial artery waveform is nonpulsatile. Anterior tibial artery tardus parvus flow is noted.  -Started on heparin gtt and dobutamine gtt -Will transfer to [facility] for CO2 angiogram as per vascular surgery as not candidate for CTA due to worsening SCr  -Keep compression dressing  -LE elevation above heart level at rest.  -Transferred to [facility] for CO2 angiogram   - Overall this patient is at   intermediate  risk (for cardiac death, nonfatal myocardial infarction, and nonfatal cardiac arrest perioperatively for this intermediate  risk procedure).   No cardiac contraindications for CO2 vangiogram  There  are  no further recommendation for risk stratifying imaging/stress testing prior to planned surgery  Seen by Podiatry-No acute pod intervention, local wound care only-   PAD with rest ischemia  s/p AT/Popliteal angioplasty on DAPT        # HFrEF (congestive heart failure). Ischemic Cardiomyopathy  ·  Plan: Hx of HF, previous admission in January, on home Lasix 40 qD, Metoprolol Succ 25 qD. Not on Entresto due to insurance issues  Last TTE: LVSF moderately decreased w/ EF 30-35 %. Moderate G2DD. Mild MR  Stress test: small-sized, moderate defect(s) in the apical wall that is predominantly fixed suggestive of an infarction with minimal marcus-infarct ischemia  Ischemic cardiomyopathy  GDMT on hold 2/2 JAMEL  Repeat TTE EF 20% with WMA RAP~~8  Repeat Limited TTE  Taper off Milrinone and start GDMT  Is currently off Hydral/Isdn and Bumex 2/2 soft BP  Continue HD with UF had 2900mL removed yesterday will stop Milrinone and observe  Started  low dose BBlocker Metoprolol tartate 12.5 mg PO q8  03/10-Off Midodrine will reattempt Hyd/Isdn  03/11-Daily HD/UF   03/12-Awake s/p Tunelled HD catheter-Plan for Discharge to [facility]  03/13-EP evaluation Arias episodes    # CAD  [facility]-RCA , severe ostial LM disease, diffuse disease in LAD and LCx, some L to R collaterals-seen by CTS advise cMR for viability poor target vessels for CABG-?High risk LM/LAD PCI  Had cMR-LVEF is 25%, greater than 75% subendocardial scarring involving the basal to mid inferior wall of the left ventricle, left ventricular transmural scarring involving the apical septal wall and likely near transmural scarring of the apical lateral wall. 50% scarring of the left ventricular basal inferoseptal wall.  03/05-s/p PCI-LM/LAD   Continue DAPT        # JAMEL on CKD   Creatinine Trend: 3.61 <--3.08<-- 4.40 <--3.84<--, 3.05<--, 4.46<--, 3.63<--, 2.35<--, 2.36<---3.38<--(HD)-- 4.76<--4.07<---3.90<-- 1.17  Has had 3 sessions HD for interrmittent HD to allow contrast based procedures is non oliguric  Plan to continue HD likely will need extended RRT  Srini molina on 03/11  Permacat      PLAN FOR REHAB  [facility]     64F, hx of CHF (last TTE on 1/16/25 EF 30-35%, G2DD), DM, diabetic foot ulcer with a recent admission at [facility] for CHF exacerbation 1/14-1/17 p/w worsening R. leg pain and fluid secretion, was meeting sepsis criteria with podiatry having low suspicion for right cellulitis and admitted for continued management of HF exacerbation and needing ischemic eval.     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  -Left leg Arterial Duplex: Slightly tardus parvus waveform of the left popliteal artery is noted, for which underlying disease cannot be excluded. Posterior tibial artery waveform is nonpulsatile. Anterior tibial artery tardus parvus flow is noted.   Transferred to [facility] for CO2 angiogram as per vascular surgery    #  PAD Wound of lower extremity.   ·  Plan: Podiatry following, b/l serous bullae, no concerns for infection  Found to have RLE coolness  -Right Leg Arterial Duplex: Popliteal artery is occluded with negligible flow of right trifurcation arteries.  -Left leg Arterial Duplex: Slightly tardus parvus waveform of the left popliteal artery is noted, for which underlying disease cannot be excluded. Posterior tibial artery waveform is nonpulsatile. Anterior tibial artery tardus parvus flow is noted.  -Started on heparin gtt and dobutamine gtt -Will transfer to [facility] for CO2 angiogram as per vascular surgery as not candidate for CTA due to worsening SCr  -Keep compression dressing  -LE elevation above heart level at rest.  -Transferred to [facility] for CO2 angiogram   - Overall this patient is at   intermediate  risk (for cardiac death, nonfatal myocardial infarction, and nonfatal cardiac arrest perioperatively for this intermediate  risk procedure).   No cardiac contraindications for CO2 vangiogram  There  are  no further recommendation for risk stratifying imaging/stress testing prior to planned surgery  Seen by Podiatry-No acute pod intervention, local wound care only-   PAD with rest ischemia  s/p AT/Popliteal angioplasty on DAPT  - RLE extensive gangrnous changes to foot dorsum, ankle, heel dorsomedial and lateral lower leg, ischemic changes to 2nd digit and hallux,  Left foot hallux distal tuft well adhered eschar, no acute signs of infection.   -Seen by vascular Plan for AKA    # HFrEF (congestive heart failure). Ischemic Cardiomyopathy  ·  Plan: Hx of HF, previous admission in January, on home Lasix 40 qD, Metoprolol Succ 25 qD. Not on Entresto due to insurance issues  Last TTE: LVSF moderately decreased w/ EF 30-35 %. Moderate G2DD. Mild MR  Stress test: small-sized, moderate defect(s) in the apical wall that is predominantly fixed suggestive of an infarction with minimal marcus-infarct ischemia  Ischemic cardiomyopathy  GDMT on hold 2/2 JAMEL  Repeat TTE EF 20% with WMA RAP~~8  Repeat Limited TTE  Taper off Milrinone and start GDMT  Is currently off Hydral/Isdn and Bumex 2/2 soft BP  Continue HD with UF had 2900mL removed yesterday will stop Milrinone and observe  Started  low dose BBlocker Metoprolol tartate 12.5 mg PO q8  03/10-Off Midodrine will reattempt Hyd/Isdn  03/11-Daily HD/UF   03/12-Awake s/p Tunelled HD catheter-Plan for Discharge to [facility]  03/13-EP evaluation Arias episodes    # CAD  [facility]-RCA , severe ostial LM disease, diffuse disease in LAD and LCx, some L to R collaterals-seen by CTS advise cMR for viability poor target vessels for CABG-?High risk LM/LAD PCI  Had cMR-LVEF is 25%, greater than 75% subendocardial scarring involving the basal to mid inferior wall of the left ventricle, left ventricular transmural scarring involving the apical septal wall and likely near transmural scarring of the apical lateral wall. 50% scarring of the left ventricular basal inferoseptal wall.  03/05-s/p PCI-LM/LAD   Continue DAPT        # JAMEL on CKD   Creatinine Trend: 3.61 <--3.08<-- 4.40 <--3.84<--, 3.05<--, 4.46<--, 3.63<--, 2.35<--, 2.36<---3.38<--(HD)-- 4.76<--4.07<---3.90<-- 1.17  Has had 3 sessions HD for interrmittent HD to allow contrast based procedures is non oliguric  Plan to continue HD likely will need extended RRT  Shiley change on 03/11  Permacath      To discuss with Vascular concerning AKA  Informed brother

## 2025-03-13 NOTE — PROGRESS NOTE ADULT - SUBJECTIVE AND OBJECTIVE BOX
Patient seen today for follow up inpatient Diabetes Mellitus management.    Chief Complaint: Type 2 Diabetes Mellitus    INTERVAL HX:  Patient seen in [facility] 2DSU 241 W1. Patient is alert and oriented, resting in bed. Patient feels good, reports eating full meals and tolerating POs. FBG slightly elevated this am to 200mg/dL. Patient denies eating any snacks overnight or OSH food. No hypoglycemia. Good postprandial BG control noted. BG have been stable and mostly at goal 100-180mg/dL while on a Consistent Carbohydrate Diet. Blood glucose levels in the last 24hrs have been 146-200mg/dL.     Review of Systems:  General: As above.  Respiratory: Denies any SOB, GARG, or cough.  Gastrointestinal: Denies any n/v/d or abdominal pain.   Endocrine: Denies any polyuria, polydipsia, polyphagia, visual changes, or numbness in feet.     Allergies  Augmentin (Stomach Upset; Vomiting; Nausea)  No Known Allergies      Intolerances  None.       MEDICATIONS  (STANDING):  acetaminophen     Tablet .. 650 milliGRAM(s) Oral every 6 hours PRN  aspirin enteric coated 81 milliGRAM(s) Oral daily  atorvastatin 40 milliGRAM(s) Oral at bedtime  benzocaine/menthol Lozenge 1 Lozenge Oral once  bisacodyl 5 milliGRAM(s) Oral every 12 hours  chlorhexidine 4% Liquid 1 Application(s) Topical <User Schedule>  clopidogrel Tablet 75 milliGRAM(s) Oral daily  clopidogrel Tablet      dextrose 5%. 1000 milliLiter(s) IV Continuous <Continuous>  dextrose 5%. 1000 milliLiter(s) IV Continuous <Continuous>  dextrose 50% Injectable 25 Gram(s) IV Push once  dextrose 50% Injectable 12.5 Gram(s) IV Push once  dextrose 50% Injectable 25 Gram(s) IV Push once  dextrose Oral Gel 15 Gram(s) Oral once PRN  glucagon  Injectable 1 milliGRAM(s) IntraMuscular once  HYDROmorphone  Injectable 1 milliGRAM(s) IV Push every 6 hours PRN  insulin glargine Injectable (LANTUS) 15 Unit(s) SubCutaneous at bedtime  insulin lispro (ADMELOG) corrective regimen sliding scale   SubCutaneous three times a day before meals  insulin lispro (ADMELOG) corrective regimen sliding scale   SubCutaneous at bedtime  insulin lispro Injectable (ADMELOG) 6 Unit(s) SubCutaneous three times a day with meals  melatonin 3 milliGRAM(s) Oral at bedtime PRN  metoprolol succinate ER 25 milliGRAM(s) Oral daily  Nephro-rober 1 Tablet(s) Oral daily  ondansetron Injectable 4 milliGRAM(s) IV Push every 6 hours PRN  pantoprazole  Injectable 40 milliGRAM(s) IV Push daily  polyethylene glycol 3350 17 Gram(s) Oral daily  senna 2 Tablet(s) Oral at bedtime  sodium chloride 0.65% Nasal 1 Spray(s) Both Nostrils three times a day PRN  sodium chloride 0.9% lock flush 10 milliLiter(s) IV Push every 1 hour PRN      atorvastatin 40 milliGRAM(s) Oral at bedtime  dextrose 50% Injectable 25 Gram(s) IV Push once  dextrose 50% Injectable 12.5 Gram(s) IV Push once  dextrose 50% Injectable 25 Gram(s) IV Push once  dextrose Oral Gel 15 Gram(s) Oral once PRN  glucagon  Injectable 1 milliGRAM(s) IntraMuscular once  insulin glargine Injectable (LANTUS) 15 Unit(s) SubCutaneous at bedtime  insulin lispro (ADMELOG) corrective regimen sliding scale   SubCutaneous three times a day before meals  insulin lispro (ADMELOG) corrective regimen sliding scale   SubCutaneous at bedtime  insulin lispro Injectable (ADMELOG) 6 Unit(s) SubCutaneous three times a day with meals      insulin lispro (ADMELOG) corrective regimen sliding scale   SubCutaneous three times a day before meals  insulin lispro (ADMELOG) corrective regimen sliding scale   SubCutaneous at bedtime  insulin lispro Injectable (ADMELOG) 6 Unit(s) SubCutaneous three times a day with meals      PHYSICAL EXAM:  VITALS:   T(C): 37.1 (03-13-25 @ 04:19), Max: 37.1 (03-13-25 @ 04:19)  HR: 100 (03-13-25 @ 04:19) (92 - 106)  BP: 110/73 (03-13-25 @ 04:19) (102/68 - 114/75)  RR: 18 (03-13-25 @ 04:19) (16 - 18)  SpO2: 95% (03-13-25 @ 04:19) (91% - 98%)    GENERAL: In no acute distress  Respiratory: Respirations unlabored  Extremities: Warm and dry, no edema  NEURO: Alert and oriented, appropriate     LABS:  POCT Blood Glucose.: 200 mg/dL (03-13-25 @ 07:42)  POCT Blood Glucose.: 146 mg/dL (03-12-25 @ 21:00)  POCT Blood Glucose.: 160 mg/dL (03-12-25 @ 17:22)  POCT Blood Glucose.: 340 mg/dL (03-12-25 @ 12:17)  POCT Blood Glucose.: 296 mg/dL (03-12-25 @ 07:57)  POCT Blood Glucose.: 296 mg/dL (03-11-25 @ 21:24)  POCT Blood Glucose.: 255 mg/dL (03-11-25 @ 17:07)  POCT Blood Glucose.: 257 mg/dL (03-11-25 @ 11:59)  POCT Blood Glucose.: 189 mg/dL (03-11-25 @ 04:37)  POCT Blood Glucose.: 102 mg/dL (03-10-25 @ 22:13)  POCT Blood Glucose.: 87 mg/dL (03-10-25 @ 21:48)  POCT Blood Glucose.: 88 mg/dL (03-10-25 @ 21:46)  POCT Blood Glucose.: 217 mg/dL (03-10-25 @ 17:22)  POCT Blood Glucose.: 173 mg/dL (03-10-25 @ 17:10)  POCT Blood Glucose.: 264 mg/dL (03-10-25 @ 11:42)                          9.6    10.22 )-----------( 225      ( 12 Mar 2025 07:24 )             28.3     03-12    130[L]  |  92[L]  |  37[H]  ----------------------------<  248[H]  4.4   |  20[L]  |  3.61[H]    Ca    8.6      12 Mar 2025 07:25  Phos  3.3     03-12  Mg     2.0     03-12          Urinalysis Basic - ( 12 Mar 2025 07:25 )    Color: x / Appearance: x / SG: x / pH: x  Gluc: 248 mg/dL / Ketone: x  / Bili: x / Urobili: x   Blood: x / Protein: x / Nitrite: x   Leuk Esterase: x / RBC: x / WBC x   Sq Epi: x / Non Sq Epi: x / Bacteria: x      A1C with Estimated Average Glucose Result: A1C with Estimated Average Glucose Result: 11.6 % (01-24-25 @ 05:40)  A1C with Estimated Average Glucose Result: >15.5 % (12-13-24 @ 06:49)

## 2025-03-13 NOTE — PROGRESS NOTE ADULT - ASSESSMENT
63 y/o F with PMH of CHF (last TTE 1/2025 with EF 30-35%), DM, diabetic foot ulcer, PAD, CAD. Recent admission at [facility] for CHF exacerbation, presented to [facility] as a transfer for worsening R leg pain. Hospital course c/b acute on chronic CHF - TTE with EF 20%, severely decreased LV and RV function, multiple regional motion abnormalities, s/p LHC revealing TVD, pleural/pericardial effusions, JAMEL, leukocytosis suspected to be in the setting of LE wound on IV ABX, persistent RLE pain w/ RLE Arterial Duplex revealing popliteal artery occlusion  Plan for eventual angiogram with vascular when medically optimized. Pulmonary called to consult as pt with c/o SOB.

## 2025-03-14 LAB
ANION GAP SERPL CALC-SCNC: 15 MMOL/L — SIGNIFICANT CHANGE UP (ref 5–17)
BUN SERPL-MCNC: 35 MG/DL — HIGH (ref 7–23)
CALCIUM SERPL-MCNC: 8.6 MG/DL — SIGNIFICANT CHANGE UP (ref 8.4–10.5)
CHLORIDE SERPL-SCNC: 92 MMOL/L — LOW (ref 96–108)
CO2 SERPL-SCNC: 21 MMOL/L — LOW (ref 22–31)
CREAT SERPL-MCNC: 2.99 MG/DL — HIGH (ref 0.5–1.3)
EGFR: 17 ML/MIN/1.73M2 — LOW
EGFR: 17 ML/MIN/1.73M2 — LOW
GLUCOSE BLDC GLUCOMTR-MCNC: 137 MG/DL — HIGH (ref 70–99)
GLUCOSE BLDC GLUCOMTR-MCNC: 173 MG/DL — HIGH (ref 70–99)
GLUCOSE BLDC GLUCOMTR-MCNC: 184 MG/DL — HIGH (ref 70–99)
GLUCOSE BLDC GLUCOMTR-MCNC: 247 MG/DL — HIGH (ref 70–99)
GLUCOSE BLDC GLUCOMTR-MCNC: 308 MG/DL — HIGH (ref 70–99)
GLUCOSE SERPL-MCNC: 231 MG/DL — HIGH (ref 70–99)
HCT VFR BLD CALC: 28.4 % — LOW (ref 34.5–45)
HGB BLD-MCNC: 9.8 G/DL — LOW (ref 11.5–15.5)
MAGNESIUM SERPL-MCNC: 2 MG/DL — SIGNIFICANT CHANGE UP (ref 1.6–2.6)
MCHC RBC-ENTMCNC: 25.4 PG — LOW (ref 27–34)
MCHC RBC-ENTMCNC: 34.5 G/DL — SIGNIFICANT CHANGE UP (ref 32–36)
MCV RBC AUTO: 73.6 FL — LOW (ref 80–100)
NRBC BLD AUTO-RTO: 0 /100 WBCS — SIGNIFICANT CHANGE UP (ref 0–0)
PHOSPHATE SERPL-MCNC: 2.5 MG/DL — SIGNIFICANT CHANGE UP (ref 2.5–4.5)
PLATELET # BLD AUTO: 191 K/UL — SIGNIFICANT CHANGE UP (ref 150–400)
POTASSIUM SERPL-MCNC: 4.3 MMOL/L — SIGNIFICANT CHANGE UP (ref 3.5–5.3)
POTASSIUM SERPL-SCNC: 4.3 MMOL/L — SIGNIFICANT CHANGE UP (ref 3.5–5.3)
RBC # BLD: 3.86 M/UL — SIGNIFICANT CHANGE UP (ref 3.8–5.2)
RBC # FLD: 21.5 % — HIGH (ref 10.3–14.5)
SODIUM SERPL-SCNC: 128 MMOL/L — LOW (ref 135–145)
WBC # BLD: 9.62 K/UL — SIGNIFICANT CHANGE UP (ref 3.8–10.5)
WBC # FLD AUTO: 9.62 K/UL — SIGNIFICANT CHANGE UP (ref 3.8–10.5)

## 2025-03-14 RX ORDER — INSULIN GLARGINE-YFGN 100 [IU]/ML
17 INJECTION, SOLUTION SUBCUTANEOUS AT BEDTIME
Refills: 0 | Status: DISCONTINUED | OUTPATIENT
Start: 2025-03-14 | End: 2025-03-15

## 2025-03-14 RX ADMIN — ATORVASTATIN CALCIUM 40 MILLIGRAM(S): 80 TABLET, FILM COATED ORAL at 21:13

## 2025-03-14 RX ADMIN — INSULIN LISPRO 6 UNIT(S): 100 INJECTION, SOLUTION INTRAVENOUS; SUBCUTANEOUS at 12:57

## 2025-03-14 RX ADMIN — Medication 40 MILLIGRAM(S): at 12:57

## 2025-03-14 RX ADMIN — INSULIN LISPRO 4: 100 INJECTION, SOLUTION INTRAVENOUS; SUBCUTANEOUS at 06:50

## 2025-03-14 RX ADMIN — INSULIN GLARGINE-YFGN 17 UNIT(S): 100 INJECTION, SOLUTION SUBCUTANEOUS at 21:14

## 2025-03-14 RX ADMIN — Medication 650 MILLIGRAM(S): at 00:48

## 2025-03-14 RX ADMIN — Medication 81 MILLIGRAM(S): at 12:56

## 2025-03-14 RX ADMIN — Medication 1 MILLIGRAM(S): at 18:30

## 2025-03-14 RX ADMIN — Medication 1 MILLIGRAM(S): at 17:37

## 2025-03-14 RX ADMIN — Medication 2 TABLET(S): at 21:12

## 2025-03-14 RX ADMIN — Medication 1 TABLET(S): at 12:57

## 2025-03-14 RX ADMIN — Medication 650 MILLIGRAM(S): at 12:58

## 2025-03-14 RX ADMIN — Medication 650 MILLIGRAM(S): at 02:48

## 2025-03-14 RX ADMIN — Medication 1 MILLIGRAM(S): at 09:29

## 2025-03-14 RX ADMIN — Medication 1 APPLICATION(S): at 08:09

## 2025-03-14 RX ADMIN — CLOPIDOGREL BISULFATE 75 MILLIGRAM(S): 75 TABLET, FILM COATED ORAL at 12:56

## 2025-03-14 RX ADMIN — Medication 650 MILLIGRAM(S): at 14:06

## 2025-03-14 RX ADMIN — INSULIN LISPRO 6 UNIT(S): 100 INJECTION, SOLUTION INTRAVENOUS; SUBCUTANEOUS at 17:39

## 2025-03-14 RX ADMIN — Medication 3 MILLIGRAM(S): at 21:13

## 2025-03-14 RX ADMIN — INSULIN LISPRO 6 UNIT(S): 100 INJECTION, SOLUTION INTRAVENOUS; SUBCUTANEOUS at 08:09

## 2025-03-14 RX ADMIN — MIDODRINE HYDROCHLORIDE 10 MILLIGRAM(S): 5 TABLET ORAL at 06:46

## 2025-03-14 RX ADMIN — INSULIN LISPRO 1: 100 INJECTION, SOLUTION INTRAVENOUS; SUBCUTANEOUS at 12:57

## 2025-03-14 RX ADMIN — Medication 1 MILLIGRAM(S): at 10:29

## 2025-03-14 NOTE — PROGRESS NOTE ADULT - ASSESSMENT
65 YO F with PMHx of HFrEF 30-35 and grade 2 ddfxn (last TTE on 01/16/25), DM2, and diabetic foot ulcer. Recent admission at [facility] for ADHF. Patient now represents to OSH and ultimately to [facility] as a transfer for worsening right leg pain and fluid secretion. As per documentation, patient was reported to have severe depletion of RLE blood flow beyond the popliteal vessel at knee and similar findings in the left. Patient was transferred to [facility] for CO2 angiogram with Dr. Baltazar. Of note, patient also with reported visual disturbance for which patient was seen and evaluated by opthalmology. Internal Medicine has been consulted on Ms. Kirby's care for medical management.     # ADHF/ BiV HFrEF 20   - Recent admission at [facility] for ADHF and on dobutamine outpatient  - TTE 1/16 with EF 30-35 with moderately reduced LVSF and grade 2 ddfxn, normal RVSF , trace TR/ IL, and trace pericardial effusion  - RPT TTE with EF 20, severely decreased LV, decreased RVSF with TAPSE 1.2, and regional wall motion abnormalities present with entire septum, entire apex, entire inferior wall, mid inferolateral segment, and mid anterolateral segment are hypokinetic.   - RHC with RA 20, PA 49/29 (40), PCWP 35, mVO2 51.1, CO/CI 3.8/2.2, SVR 1095 w/ Multivessel CAD   - s/p zaroxyln 10 QD to augment diuresis   - s/p bumex GTT   - s/p HTS to augment diuresis   - RPT limited TTE w/ LVSF severely decreased, reduced RVSF, LVOT VTI 12, mild to mod TR, and mildly elevated right atrial pressure  - s/p bumex 4 IV QD, but anuric and now dc'ed   - Case discussed with EP and needs to be on GDMT for 3 months before AICD placement and should be stabilized off of inotropic support.    - RPT ECHO post cath with EF 28 %, regional WMA, multiple segmental abnormalities exist, and reduced RVSF    - Case discussed with HF and cards and monitoring off milrinone GTT   - Continue on toprol 25 and hydral on nonHD days  - Continue volume removal with HD   - Monitor I and O   - Monitor volume status   - Check daily weights    - Monitor on telemetry  - Monitor electrolytes   - Cardio, HF, Renal, and EP evals appreciated; F/u recs     # Bradycardia   - SB to 48 BPM at 0400 while asleep on 3/11 and likely related to BB , however last dose prior to event was 3/10 at 0600.  - EP called and pending recommendations   - Monitor telemetry    # Tachycardia and Hypoxia  - Tachycardiac and on RA with SPO2 running 90-92   - Could be second to low cardiac output   - CTA with no PE  - Duplex negative for DVT   - On Toprol XL 25 PO QD.   - Monitor HR   - Monitor closely on Tele  - Cardio eval appreciated; F/u recs    # JAMEL with Hyponatremia and Metabolic Acidosis   - Baseline CRE 0.7-1.2 and increased to ~4  - As per OSH documentation, JAMEL was thought to be second to Unasyn/ Bumex / Entresto use and Hypotension   - US RENAL with no hydronephrosis  - Likely cardiorenal induced with low flow state in ADHF, however now rising again and concern for milrinone vs overdiuresis (prerenal) vs cardiorenal.   - Milrinone adjusted and diuresis turned off, however no improvement/ worsened in renal function noted.   - s/p shiley placement and started on HD 2/20  - s/p permacath 3/11  - Continue on HD per renal  - Avoid nephrotoxic agents  - Monitor Cr and daily BMP   - Renal and HF evals appreciated; F/u recs    # CAD with TVD   - NST abnormal with small-sized, moderate defect in apical wall that is predominantly fixed suggestive of an infarction with minimal marcus-infarct ischemia. Post stress LVEF 25.  - s/p LHC with RCA , severe ostial LM disease, diffuse disease in LAD and LCx, some L to R collaterals (Multivessel CAD)  - Case discussed with CTSx and NOT a candidate for CABG due to comorbidities  - Cardiac MRI with greater than 75% subendocardial scarring of the basal to mid-inferior wall of the LV and 50% scarring of the left ventricular basal inferoseptal wall. There is also left ventricular transmural scarring involving the apical septal wall and likely near transmural scarring of the apical lateral wall.  - s/p RPT LHC 3/4 with LM 80 s/p POBA/ VERA with LM 1, pLAD 90 s/p POBA/ VERA with pLAD 1, and Cx 80 s/p POBA with Cx 10.   - Continue on DAPT and Statin   - IC, Cards and HF following     # BL LE Edema likely in setting of ADHF  - Remove volume as per Cardio, HF and Renal   - BL LE elevation and compression  - LE Duplex neg   - Monitor for now    # Pericardial effusion likely in setting ADHF  - TTE with trace pericardial effusion   - CT Chest with small pericardial effusion   - Denies chest pain, palpitations, chest tightness or discomfort, shortness of breath or dyspnea   - Repeat serial TTE for further evaluation   - Diuresis per cardio/ renal.  - Monitor on telemetry  - Cardio following    # Pleural effusion likely in setting ADHF  - CT Chest with small BL pleural effusions  - Monitor O2 saturation  - Supplement to maintain > 90%   - Diuresis / HD per cardio/ renal.     # Hypernatremia to hyponatremia  - Hypernatremia likely from HTS vs over diuresis/ intravascular dehydration. HTS and diuresis stopped with improved sodium   - Hyponatremia now noted second to volume overload   - Avoid overcorrection > 6-8 mEq in 24 hours  - Monitor sodium with HD    # Transaminitis  - Likely 2/2 hepatic congestion   - Volume removal with HD as tolerated  - Trend LFTs, if uptrend post-HD initiation, check ABD US  - Serial ABD Examinations    # Leukocytosis likely in setting of BL LE ulcers with pop occlusion   - BCx negative   - UCx with probable contamination   - S/P unasyn for ABX.   - Leukocytosis fluctuating, however appears nontoxic with no fever spikes and monitoring closely off ABX  - If febrile check pan cultures   - Trend CBC, temp curve, VS and adjust as tolerated    # Foot ulcers with worsening RLE pain second to pop artery occlusion +/- diabetic ulcers   - RLE Arterial Duplex with popliteal artery occlusion   - LLE Arterial Duplex with underlying disease cannot be excluded   - s/p angio with pop and AT VERA on 2/24   - c/b necrosis of RLE   - Pending vascular AKA  - Continue on Lipitor and DAPT  - Continue pain control with oxy  - Wound care called for dressing recs   - Vascular following,     # Visual Disturbance  - CT Head w/ old infarcts  - On ASA and Statin   - Opthalmology eval appreciated    # Indigestion and Constipation   - PPI, miralax and senna continued  - Monitor BMs    # Anemia likely mixed AOCD vs iron deficiency   - HH stable in 9s on HSQ  - Anemia panel with AOCD   - Started on venofer, but ferritin high and now stopped   - Continue on EPO with HD per renal  - Monitor HH   - Transfuse for Hgb < 8  - Maintain active T/S    # Diabetes Mellitus A1C 11.6   - Continue on lantus 13 with lispro 5 and ISS   - Diabetic DASH diet   - Monitor and adjust glucose levels PRN   - Endocrine following; F/u recs     # HLD and HLD  - Continue on lipitor and toprol  - Monitor BP    # DISPO TBD  - Palliative care called and patient remains FULL CODE     Patient seen and examined by me. patient care and plan discussed and reviewed with PA. Plan as outlined above edited by me to reflect our discussion. Advanced care planning/advanced directives discussed with patient/family. DNR status including forceful chest compressions to attempt to restart the heart, ventilator support/artificial breathing, electric shock, artificial nutrition, health care proxy, Molst form all discussed with pt. Sixty seven minutes of total time dedicated to this patient visit today including preparing to see the patient (eg. review of tests), obtaining and/or reviewing separately obtained history, obtaining/reviewing vitals, performing a medically appropriate examination and/or evaluation, counseling and educating the patient/family/caregiver, reviewing previous notes and test results, and procedures, communicating with other health professionals (when not separately reported), and documenting clinical information in the electronic health record.

## 2025-03-14 NOTE — PROGRESS NOTE ADULT - ASSESSMENT
63 y/o F with PMH of CHF (last TTE 1/2025 with EF 30-35%), DM, diabetic foot ulcer, PAD, CAD. Recent admission at [facility] for CHF exacerbation, presented to [facility] as a transfer for worsening R leg pain. Hospital course c/b acute on chronic CHF - TTE with EF 20%, severely decreased LV and RV function, multiple regional motion abnormalities, s/p LHC revealing TVD, pleural/pericardial effusions, JAMEL, leukocytosis suspected to be in the setting of LE wound on IV ABX, persistent RLE pain w/ RLE Arterial Duplex revealing popliteal artery occlusion  Plan for eventual angiogram with vascular when medically optimized. Pulmonary called to consult as pt with c/o SOB.

## 2025-03-14 NOTE — PROGRESS NOTE ADULT - SUBJECTIVE AND OBJECTIVE BOX
[facility] NEPHROLOGY- PROGRESS NOTE    64y Female with history of CHF presents as a transfer for LE CO2 angiogram. Nephrology consulted for elevated Scr.      REVIEW OF SYSTEMS:  Gen: no fevers  Cards: no chest pain  Resp: no dyspnea  GI: no nausea or vomiting or diarrhea  Vascular: + LE edema    Augmentin (Stomach Upset; Vomiting; Nausea)  No Known Allergies      Hospital Medications: Medications reviewed        VITALS:  T(F): 98.3 (03-14-25 @ 04:43), Max: 98.3 (03-14-25 @ 04:43)  HR: 90 (03-14-25 @ 04:43)  BP: 103/65 (03-14-25 @ 04:43)  RR: 18 (03-14-25 @ 04:43)  SpO2: 93% (03-14-25 @ 04:43)  Wt(kg): --    03-13 @ 07:01  -  03-14 @ 07:00  --------------------------------------------------------  IN: 125 mL / OUT: 0 mL / NET: 125 mL        PHYSICAL EXAM:  Gen: NAD, calm  Cards: RRR, +S1/S2, no M/G/R  Resp: bibasilar rales  GI: soft, NT/ND, NABS  Vascular: + LE wrapped B/L  + RIJ TDC intact        LABS:  03-14    128[L]  |  92[L]  |  35[H]  ----------------------------<  231[H]  4.3   |  21[L]  |  2.99[H]    Ca    8.6      14 Mar 2025 05:45  Phos  2.5     03-14  Mg     2.0     03-14      Creatinine Trend: 2.99 <--, 3.61 <--, 3.08 <--, 4.40 <--, 3.84 <--, 3.05 <--                        9.8    9.62  )-----------( 191      ( 14 Mar 2025 05:46 )             28.4     Urine Studies:  Urinalysis Basic - ( 14 Mar 2025 05:45 )    Color:  / Appearance:  / SG:  / pH:   Gluc: 231 mg/dL / Ketone:   / Bili:  / Urobili:    Blood:  / Protein:  / Nitrite:    Leuk Esterase:  / RBC:  / WBC    Sq Epi:  / Non Sq Epi:  / Bacteria:

## 2025-03-14 NOTE — PROGRESS NOTE ADULT - SUBJECTIVE AND OBJECTIVE BOX
MR#[id number]  PATIENT NAME:COLE ALBA    DATE OF SERVICE: 03-14-25 @ 06:18  Patient was seen and examined by Yordy Gilmore MD on    03-14-25 @ 06:18 .  Interim events noted.Consultant notes ,Labs,Telemetry reviewed by me       HOSPITAL COURSE: HPI:  64F, hx of CHF (last TTE on 1/16/25 EF 30-35%, G2DD), DM, diabetic foot ulcer with a recent admission at [facility] for CHF exacerbation presented as a transfer for worsening R. leg pain and fluid secretion, On non-invasive imaging demonstrating severe depletion of RLE blood flow beyond the popliteal vessel at knee and similar findings in L. Was transferred to [facility] for CO2 angiogram with Dr. Baltazar. (29 Jan 2025 20:05)      INTERIM EVENTS:Patient seen at bedside ,interim events noted.  02/14-Had cath Multivessel CAD started on Milrinone for CTS evaluation albeit targets not optimal  02/15-Awake on Milrinone and Bumex gtt-seen by CTS not a surgical candidate-needs Vascular work-up for LE PAD-scheduled for Angiogram on Wednesday    Weight 170Kg---> 164 Kg--->161.1 Kg---151.2 ---> 155 --> 154.7 ---> 158 --> 148--->147 >148 --> 146 --> 141---> 149  Kg    02/25-s/p RLE angiogram with pop and AT angioplasty   02/27-Awake had iHD plan for High risk PCI oLM/LAD tomorrow 1000mL removed  Had ? pAF ~~3 secs  02/28-Awake no dyspnea chest pain plan for PCI-oLM/LAD todat HD after PCI-Sinus rhythm  03/01-Had LHC PCWP-15 still elevated with low BP Plan to dialyse over weekend and plan for PCI on Monday-No dyspnea  03/02-Awake had HD removed 1500Ml no dyspnea Plan for PCI tomorrow  03/03-Awake Sinus rhythm no dyspnea FOR High risk PCI oLM/LAD   03/04-Awake had HD last night-1500mL removed For High risk PCI today  03/05-Awake s/p LHC VERA x 1 to LAD, VERA to LM , POBA to CX via RFA  03/06-Awake no dyspnea chest pain  03/07-Awake poor UOP plan to continue HD  03/08-Awake Sinus Rhythm CTPA No PE Had HD 1500mL removed  03/09 Awake no dyspnea Sinus Rhythm-Had HD 2900  mL removed noted to be Tachy -Sinus~~120's  03/10-Awake not orthopneac less Tachy~~100's  03/11-Awake Had HD 1500mL removed episode sinus bradycardia  03/12-Awake had Tunnelled HD catheter placed  03/13-Had HD 1500mL noted barrington episodes Sinus at rest   03/14-Awake Seen by EP continue BBlockers-Plan for Right AKA Discussed with Primary Vascular and Family          PMH -reviewed admission note, no change since admission  HEART FAILURE: Acute[x ]Chronic[ ] Systolic[x ] Diastolic[ ] Combined Systolic and Diastolic[ ]  CAD[x ] CABG[ ] PCI[ ]  DEVICES[ ] PPM[ ] ICD[ ] ILR[ ]  ATRIAL FIBRILLATION[ ] Paroxysmal[ ] Permanent[ ] CHADS2-[  ]  JAMEL[x ] CKD1[ ] CKD2[ ] CKD3[ ] CKD4[x ] ESRD[ ]  COPD[ ] HTN[x ]   DM[x ] Type1[ ] Type 2[x ]   CVA[ ] Paresis[ ]    AMBULATION: Assisted[x ] Cane/walker[ ] Independent[ ]        MEDICATIONS  (STANDING):  aspirin enteric coated 81 milliGRAM(s) Oral daily  atorvastatin 40 milliGRAM(s) Oral at bedtime  benzocaine/menthol Lozenge 1 Lozenge Oral once  bisacodyl 5 milliGRAM(s) Oral every 12 hours  chlorhexidine 4% Liquid 1 Application(s) Topical <User Schedule>  clopidogrel Tablet 75 milliGRAM(s) Oral daily  clopidogrel Tablet      glucagon  Injectable 1 milliGRAM(s) IntraMuscular once  insulin glargine Injectable (LANTUS) 15 Unit(s) SubCutaneous at bedtime  insulin lispro (ADMELOG) corrective regimen sliding scale   SubCutaneous at bedtime  insulin lispro (ADMELOG) corrective regimen sliding scale   SubCutaneous three times a day before meals  insulin lispro Injectable (ADMELOG) 6 Unit(s) SubCutaneous three times a day with meals  metoprolol succinate ER 25 milliGRAM(s) Oral daily  midodrine. 10 milliGRAM(s) Oral once  Nephro-rober 1 Tablet(s) Oral daily  pantoprazole  Injectable 40 milliGRAM(s) IV Push daily  polyethylene glycol 3350 17 Gram(s) Oral daily  senna 2 Tablet(s) Oral at bedtime    MEDICATIONS  (PRN):  acetaminophen     Tablet .. 650 milliGRAM(s) Oral every 6 hours PRN Mild Pain (1 - 3)  dextrose Oral Gel 15 Gram(s) Oral once PRN Blood Glucose LESS THAN 70 milliGRAM(s)/deciliter  HYDROmorphone  Injectable 1 milliGRAM(s) IV Push every 6 hours PRN Severe Pain (7 - 10)  melatonin 3 milliGRAM(s) Oral at bedtime PRN Insomnia  ondansetron Injectable 4 milliGRAM(s) IV Push every 6 hours PRN Nausea and/or Vomiting  sodium chloride 0.65% Nasal 1 Spray(s) Both Nostrils three times a day PRN Dryness  sodium chloride 0.9% lock flush 10 milliLiter(s) IV Push every 1 hour PRN Pre/post blood products, medications, blood draw, and to maintain line patency            REVIEW OF SYSTEMS:  Constitutional: [ ] fever, [ ]weight loss,  [x ]fatigue [ ]weight gain  Eyes: [ ] visual changes  Respiratory: [ ]shortness of breath;  [ ] cough, [ ]wheezing, [ ]chills, [ ]hemoptysis  Cardiovascular: [ ] chest pain, [ ]palpitations, [ ]dizziness,  [ ]leg swelling[ ]orthopnea[ ]PND  Gastrointestinal: [ ] abdominal pain, [ ]nausea, [ ]vomiting,  [ ]diarrhea [ ]Constipation [ ]Melena  Genitourinary: [ ] dysuria, [ ] hematuria [ ][place]  Neurologic: [ ] headaches [ ] tremors[ ]weakness [ ]Paralysis Right[ ] Left[ ]  Skin: [ ] itching, [ ]burning, [ ] rashes  Endocrine: [ ] heat or cold intolerance  Musculoskeletal: [ ] joint pain or swelling; [ ] muscle, back, or extremity pain  Psychiatric: [ ] depression, [ ]anxiety, [ ]mood swings, or [ ]difficulty sleeping  Hematologic: [ ] easy bruising, [ ] bleeding gums    [ ] All remaining systems negative except as per above.   [ ]Unable to obtain.  [x] No change in ROS since admission      Vital Signs Last 24 Hrs  T(C): 36.8 (14 Mar 2025 04:43), Max: 36.8 (14 Mar 2025 04:43)  T(F): 98.3 (14 Mar 2025 04:43), Max: 98.3 (14 Mar 2025 04:43)  HR: 90 (14 Mar 2025 04:43) (86 - 90)  BP: 103/65 (14 Mar 2025 04:43) (95/61 - 106/68)  BP(mean): 72 (13 Mar 2025 20:12) (72 - 72)  RR: 18 (14 Mar 2025 04:43) (18 - 18)  SpO2: 93% (14 Mar 2025 04:43) (93% - 99%)    Parameters below as of 14 Mar 2025 04:43  Patient On (Oxygen Delivery Method): room air      I&O's Summary    12 Mar 2025 07:01  -  13 Mar 2025 07:00  --------------------------------------------------------  IN: 565 mL / OUT: 2000 mL / NET: -1435 mL    13 Mar 2025 07:01  -  14 Mar 2025 06:18  --------------------------------------------------------  IN: 125 mL / OUT: 0 mL / NET: 125 mL        PHYSICAL EXAM:  General: No acute distress BMI-24  HEENT: EOMI, PERRL  Neck: Supple, [ ] JVD  Lungs: Equal air entry bilaterally; [ ] rales [ ] wheezing [ ] rhonchi  Heart: Regular rate and rhythm; [x ] murmur   2/6 [ x] systolic [ ] diastolic [ ] radiation[ ] rubs [ ]  gallops  Abdomen: Nontender, bowel sounds present  Extremities: No clubbing, cyanosis, [ ] edema [x ]Bilateral lower extremity venous stasis wounds to dermis, mild serous drainage, no acute signs of infection. Left foot hallux distal tuft well adhered eschar, no acute signs of infection. - RLE extensive gangrnous changes to foot dorsum, ankle, heel dorsomedial and lateral lower leg, ischemic changes to 2nd digit and hallux,  Left foot hallux distal tuft well adhered eschar, no acute signs of infection.     Nervous system:  Alert & Oriented X3, no focal deficits  Psychiatric: Normal affect  Skin: No rashes or lesions    LABS:  03-14    128[L]  |  92[L]  |  35[H]  ----------------------------<  231[H]  4.3   |  21[L]  |  2.99[H]    Ca    8.6      14 Mar 2025 05:45  Phos  2.5     03-14  Mg     2.0     03-14      Creatinine Trend: 2.99<--, 3.61<--, 3.08<--, 4.40<--, 3.84<--, 3.05<--                        9.8    9.62  )-----------( 191      ( 14 Mar 2025 05:46 )             28.4         Hepatitis C Antibody Test (03.13.25 @ 05:53)   Hepatitis C Virus S/CO Ratio: 0.06 S/CO  Hepatitis C Virus Interpretation: Nonreact:   INTERPRETATIVE COMMENT Non-Reactive      TTE W or WO Ultrasound Enhancing Agent (03.10.25 @ 12:48) >  CONCLUSIONS:      1. Left ventricular systolic function is severely decreased with an ejection fraction of 28 % by 3D. Regional wall motion abnormalities present.   2. Multiple segmental abnormalities exist.The basal and mid anterior septum, basal and mid inferior septum, basal and mid inferior wall, and mid inferolateral segment are akinetic.  The entire apex, entire anterior wall, basal and mid anterolateral wall, and basal inferolateral segment are hypokinetic.   3. Reduced right ventricular systolic function.   4. Compared to the transthoracic echocardiogram performed on 2/19/2025, there have been no significant interval changes.   5. Right pleural effusion noted.       VA Duplex Lower Ext Vein Scan, Bilat (03.10.25 @ 13:55) >    IMPRESSION:  No evidence of deep venous thrombosis in either lower extremity.

## 2025-03-14 NOTE — PROGRESS NOTE ADULT - ASSESSMENT
64y Female with history of CHF presents as a transfer for LE CO2 angiogram. Nephrology consulted for elevated Scr.    1) JAMEL: likely due to ATN and CRS for which patient initiated on RRT on 2/20. JAMEL exacerbated by contrast nephropathy with last HD on 3/12 tolerated well with 1.5L removed. Plan for additional HD today. S/P TDC placement on 3/11. S/P straight cath for urinary retention. Monitor serial bladder scans. Pre-HD Scr improving suggestive of renal recovery. Avoid nephrotoxins.    2) HTN: BP low normal. On midodrine pre-HD to avoid intradialytic hypotension.    3) LE edema: Off diuretics. UF with HD. TTE with severely decreased LVSF. Monitor UO.     4) Anemia: Hb improving with low TSAT. No IV iron given elevated ferritin. S/P Epo 8K X 1 dose 3/12. Monitor Hb.    5) Hyponatremia: Secondary to JAMEL and polydipsia. High dialysate Na bath. Continue with 1L FR.      [facility] NEPHROLOGY  Raji Waterman M.D.  Mars Feliz D.O.  Kelley Parks M.D.  MD Nancy Klukarni, MSN, ANP-C    Telephone: [phone]  Facsimile: [phone] [place] [address], [address]  [place] [address]

## 2025-03-14 NOTE — PROGRESS NOTE ADULT - SUBJECTIVE AND OBJECTIVE BOX
Name of Patient : TOMASZ ALBA  MRN: [id number]  Date of visit: 03-14-25       Subjective: Patient seen and examined. No new events except as noted.   doing okay     REVIEW OF SYSTEMS:    CONSTITUTIONAL: No weakness, fevers or chills  EYES/ENT: No visual changes;  No vertigo or throat pain   NECK: No pain or stiffness  RESPIRATORY: No cough, wheezing, hemoptysis; No shortness of breath  CARDIOVASCULAR: No chest pain or palpitations  GASTROINTESTINAL: No abdominal or epigastric pain. No nausea, vomiting, or hematemesis; No diarrhea or constipation. No melena or hematochezia.  GENITOURINARY: No dysuria, frequency or hematuria  NEUROLOGICAL: No numbness or weakness  SKIN: No itching, burning, rashes, or lesions   All other review of systems is negative unless indicated above.    MEDICATIONS:  MEDICATIONS  (STANDING):  aspirin enteric coated 81 milliGRAM(s) Oral daily  atorvastatin 40 milliGRAM(s) Oral at bedtime  benzocaine/menthol Lozenge 1 Lozenge Oral once  bisacodyl 5 milliGRAM(s) Oral every 12 hours  chlorhexidine 4% Liquid 1 Application(s) Topical <User Schedule>  clopidogrel Tablet 75 milliGRAM(s) Oral daily  clopidogrel Tablet      dextrose 5%. 1000 milliLiter(s) (100 mL/Hr) IV Continuous <Continuous>  dextrose 5%. 1000 milliLiter(s) (50 mL/Hr) IV Continuous <Continuous>  dextrose 50% Injectable 25 Gram(s) IV Push once  dextrose 50% Injectable 12.5 Gram(s) IV Push once  dextrose 50% Injectable 25 Gram(s) IV Push once  glucagon  Injectable 1 milliGRAM(s) IntraMuscular once  insulin glargine Injectable (LANTUS) 17 Unit(s) SubCutaneous at bedtime  insulin lispro (ADMELOG) corrective regimen sliding scale   SubCutaneous three times a day before meals  insulin lispro (ADMELOG) corrective regimen sliding scale   SubCutaneous at bedtime  insulin lispro Injectable (ADMELOG) 6 Unit(s) SubCutaneous three times a day with meals  metoprolol succinate ER 25 milliGRAM(s) Oral daily  Nephro-rober 1 Tablet(s) Oral daily  pantoprazole  Injectable 40 milliGRAM(s) IV Push daily  polyethylene glycol 3350 17 Gram(s) Oral daily  senna 2 Tablet(s) Oral at bedtime      PHYSICAL EXAM:  T(C): 36.6 (03-14-25 @ 20:16), Max: 36.8 (03-14-25 @ 04:43)  HR: 89 (03-14-25 @ 20:16) (87 - 91)  BP: 95/59 (03-14-25 @ 20:16) (95/59 - 131/68)  RR: 16 (03-14-25 @ 20:16) (16 - 18)  SpO2: 97% (03-14-25 @ 20:16) (93% - 99%)  Wt(kg): --  I&O's Summary    13 Mar 2025 07:01  -  14 Mar 2025 07:00  --------------------------------------------------------  IN: 125 mL / OUT: 0 mL / NET: 125 mL    14 Mar 2025 07:01  -  14 Mar 2025 22:41  --------------------------------------------------------  IN: 360 mL / OUT: 1500 mL / NET: -1140 mL          Appearance: Normal	  HEENT:  PERRLA   Lymphatic: No lymphadenopathy   Cardiovascular: Normal S1 S2, no JVD  Respiratory: normal effort , clear  Gastrointestinal:  Soft, Non-tender  Skin: No rashes,  warm to touch  Psychiatry:  Mood & affect appropriate  Musculuskeletal: LE dressing     recent labs, Imaging and EKGs personally reviewed     03-13-25 @ 07:01  -  03-14-25 @ 07:00  --------------------------------------------------------  IN: 125 mL / OUT: 0 mL / NET: 125 mL    03-14-25 @ 07:01  -  03-14-25 @ 22:41  --------------------------------------------------------  IN: 360 mL / OUT: 1500 mL / NET: -1140 mL                          9.8    9.62  )-----------( 191      ( 14 Mar 2025 05:46 )             28.4               03-14    128[L]  |  92[L]  |  35[H]  ----------------------------<  231[H]  4.3   |  21[L]  |  2.99[H]    Ca    8.6      14 Mar 2025 05:45  Phos  2.5     03-14  Mg     2.0     03-14                         Urinalysis Basic - ( 14 Mar 2025 05:45 )    Color: x / Appearance: x / SG: x / pH: x  Gluc: 231 mg/dL / Ketone: x  / Bili: x / Urobili: x   Blood: x / Protein: x / Nitrite: x   Leuk Esterase: x / RBC: x / WBC x   Sq Epi: x / Non Sq Epi: x / Bacteria: x

## 2025-03-14 NOTE — PROGRESS NOTE ADULT - ASSESSMENT
64F, hx of CHF (last TTE on 1/16/25 EF 30-35%, G2DD), DM, diabetic foot ulcer with a recent admission at [facility] for CHF exacerbation 1/14-1/17 p/w worsening R. leg pain and fluid secretion, was meeting sepsis criteria with podiatry having low suspicion for right cellulitis and admitted for continued management of HF exacerbation and needing ischemic eval.     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  -Left leg Arterial Duplex: Slightly tardus parvus waveform of the left popliteal artery is noted, for which underlying disease cannot be excluded. Posterior tibial artery waveform is nonpulsatile. Anterior tibial artery tardus parvus flow is noted.   Transferred to [facility] for CO2 angiogram as per vascular surgery    #  PAD Wound of lower extremity.   ·  Plan: Podiatry following, b/l serous bullae, no concerns for infection  Found to have RLE coolness  -Right Leg Arterial Duplex: Popliteal artery is occluded with negligible flow of right trifurcation arteries.  -Left leg Arterial Duplex: Slightly tardus parvus waveform of the left popliteal artery is noted, for which underlying disease cannot be excluded. Posterior tibial artery waveform is nonpulsatile. Anterior tibial artery tardus parvus flow is noted.  -Started on heparin gtt and dobutamine gtt -Will transfer to [facility] for CO2 angiogram as per vascular surgery as not candidate for CTA due to worsening SCr  -Keep compression dressing  -LE elevation above heart level at rest.  -Transferred to [facility] for CO2 angiogram   - Overall this patient is at   intermediate  risk (for cardiac death, nonfatal myocardial infarction, and nonfatal cardiac arrest perioperatively for this intermediate  risk procedure).   No cardiac contraindications for CO2 vangiogram  There  are  no further recommendation for risk stratifying imaging/stress testing prior to planned surgery  Seen by Podiatry-No acute pod intervention, local wound care only-   PAD with rest ischemia  s/p AT/Popliteal angioplasty on DAPT  - RLE extensive gangrnous changes to foot dorsum, ankle, heel dorsomedial and lateral lower leg, ischemic changes to 2nd digit and hallux,  Left foot hallux distal tuft well adhered eschar, no acute signs of infection.   -Seen by vascular Plan for AKA  Vascular planning for Right AKA Discussed with Vascular she is as in optimal cardiac condition as possible to undergo surgery under GA  Her HF is compensated and has had PCI  - Overall this patient is at   intermediate to high but not prohobitive risk (for cardiac death, nonfatal myocardial infarction, and nonfatal cardiac arrest perioperatively for this intermediate risk procedure).     The patient is however without evidence of ACS, Decompensated Heart Failure,Obstructive Valvular Heart disease or Unstable arrhythmia.   There  are  no further recommendation for risk stratifying imaging/stress testing prior to planned surgery  She is in as optimal condition as possible for the procedure      # HFrEF (congestive heart failure). Ischemic Cardiomyopathy  ·  Plan: Hx of HF, previous admission in January, on home Lasix 40 qD, Metoprolol Succ 25 qD. Not on Entresto due to insurance issues  Last TTE: LVSF moderately decreased w/ EF 30-35 %. Moderate G2DD. Mild MR  Stress test: small-sized, moderate defect(s) in the apical wall that is predominantly fixed suggestive of an infarction with minimal marcus-infarct ischemia  Ischemic cardiomyopathy  GDMT on hold 2/2 JAMEL  Repeat TTE EF 20% with WMA RAP~~8  Repeat Limited TTE  Taper off Milrinone and start GDMT  Is currently off Hydral/Isdn and Bumex 2/2 soft BP  Continue HD with UF had 2900mL removed yesterday will stop Milrinone and observe  Started  low dose BBlocker Metoprolol tartate 12.5 mg PO q8  03/10-Off Midodrine will reattempt Hyd/Isdn  03/11-Daily HD/UF   03/12-Awake s/p Tunelled HD catheter-Plan for Discharge to [facility]  03/13-EP evaluation Arias episodes  03/14-Increasing necrosis noted Right LE Vascular planning for Right AKA     # CAD  LHC-RCA , severe ostial LM disease, diffuse disease in LAD and LCx, some L to R collaterals-seen by CTS advise cMR for viability poor target vessels for CABG-?High risk LM/LAD PCI  Had cMR-LVEF is 25%, greater than 75% subendocardial scarring involving the basal to mid inferior wall of the left ventricle, left ventricular transmural scarring involving the apical septal wall and likely near transmural scarring of the apical lateral wall. 50% scarring of the left ventricular basal inferoseptal wall.  03/05-s/p PCI-LM/LAD   Continue  uninterrupted DAPT        # JAMEL on CKD   Creatinine Trend: 2.99 <--3.61 <--3.08<-- 4.40 <--3.84<--, 3.05<--, 4.46<--, 3.63<--, 2.35<--, 2.36<---3.38<--(HD)-- 4.76<--4.07<---3.90<-- 1.17  Has had 3 sessions HD for interrmittent HD to allow contrast based procedures is non oliguric  Plan to continue HD likely will need extended RRT  Shiley change on 03/11  [facility]

## 2025-03-14 NOTE — CHART NOTE - NSCHARTNOTEFT_GEN_A_CORE
POCT Blood Glucose:  137 mg/dL (03-14-25 @ 17:07)  173 mg/dL (03-14-25 @ 12:47)  247 mg/dL (03-14-25 @ 07:59)  308 mg/dL (03-14-25 @ 06:45)  205 mg/dL (03-13-25 @ 21:41)      eMAR:atorvastatin   40 milliGRAM(s) Oral (03-13-25 @ 22:01)    insulin glargine Injectable (LANTUS)   15 Unit(s) SubCutaneous (03-13-25 @ 22:00)    insulin lispro (ADMELOG) corrective regimen sliding scale   1 Unit(s) SubCutaneous (03-14-25 @ 12:57)   4 Unit(s) SubCutaneous (03-14-25 @ 06:50)    insulin lispro Injectable (ADMELOG)   6 Unit(s) SubCutaneous (03-14-25 @ 12:57)   6 Unit(s) SubCutaneous (03-14-25 @ 08:09)    Diet, Regular:   Consistent Carbohydrate {No Snacks} (CSTCHO)  1000mL Fluid Restriction ([id number])  Low Sodium  No Concentrated Phosphorus  Halal  Lacto Veg (Accepts Milk & Milk Products)  Supplement Feeding Modality:  Oral  Nepro Cans or Servings Per Day:  1       Frequency:  Daily (03-06-25 @ 15:14) [Active]      BGs and insulin regimen reviewed and spoke with patient at bedside.   "Feel okay" Tolerating POs, eats well. Noted elevated fasting BG for the last 4 days. Did not remember whether she ate snacks last night or early this am before BG check    - Increase lantus to 17 units at HS        Contact via [employer] Teams during business hours  To reach covering provider access JESS via sunrise tools  For Urgent matters/after-hours/weekends/holidays please page endocrine fellow on call   For nonurgent matters please email [email or web address]    Please note that this patient may be followed by different provider tomorrow.  Notify endocrine 24 hours prior to discharge for final recommendations

## 2025-03-15 LAB
ANION GAP SERPL CALC-SCNC: 15 MMOL/L — SIGNIFICANT CHANGE UP (ref 5–17)
BUN SERPL-MCNC: 19 MG/DL — SIGNIFICANT CHANGE UP (ref 7–23)
CALCIUM SERPL-MCNC: 8.6 MG/DL — SIGNIFICANT CHANGE UP (ref 8.4–10.5)
CHLORIDE SERPL-SCNC: 97 MMOL/L — SIGNIFICANT CHANGE UP (ref 96–108)
CO2 SERPL-SCNC: 24 MMOL/L — SIGNIFICANT CHANGE UP (ref 22–31)
CREAT SERPL-MCNC: 1.8 MG/DL — HIGH (ref 0.5–1.3)
EGFR: 31 ML/MIN/1.73M2 — LOW
EGFR: 31 ML/MIN/1.73M2 — LOW
GLUCOSE BLDC GLUCOMTR-MCNC: 138 MG/DL — HIGH (ref 70–99)
GLUCOSE BLDC GLUCOMTR-MCNC: 214 MG/DL — HIGH (ref 70–99)
GLUCOSE BLDC GLUCOMTR-MCNC: 257 MG/DL — HIGH (ref 70–99)
GLUCOSE BLDC GLUCOMTR-MCNC: 99 MG/DL — SIGNIFICANT CHANGE UP (ref 70–99)
GLUCOSE SERPL-MCNC: 85 MG/DL — SIGNIFICANT CHANGE UP (ref 70–99)
MAGNESIUM SERPL-MCNC: 2 MG/DL — SIGNIFICANT CHANGE UP (ref 1.6–2.6)
POTASSIUM SERPL-MCNC: 4 MMOL/L — SIGNIFICANT CHANGE UP (ref 3.5–5.3)
POTASSIUM SERPL-SCNC: 4 MMOL/L — SIGNIFICANT CHANGE UP (ref 3.5–5.3)
SODIUM SERPL-SCNC: 136 MMOL/L — SIGNIFICANT CHANGE UP (ref 135–145)

## 2025-03-15 RX ORDER — INSULIN GLARGINE-YFGN 100 [IU]/ML
15 INJECTION, SOLUTION SUBCUTANEOUS AT BEDTIME
Refills: 0 | Status: DISCONTINUED | OUTPATIENT
Start: 2025-03-15 | End: 2025-03-16

## 2025-03-15 RX ADMIN — Medication 1 TABLET(S): at 11:54

## 2025-03-15 RX ADMIN — INSULIN GLARGINE-YFGN 15 UNIT(S): 100 INJECTION, SOLUTION SUBCUTANEOUS at 21:23

## 2025-03-15 RX ADMIN — INSULIN LISPRO 1: 100 INJECTION, SOLUTION INTRAVENOUS; SUBCUTANEOUS at 21:25

## 2025-03-15 RX ADMIN — INSULIN LISPRO 6 UNIT(S): 100 INJECTION, SOLUTION INTRAVENOUS; SUBCUTANEOUS at 12:04

## 2025-03-15 RX ADMIN — Medication 1 APPLICATION(S): at 08:28

## 2025-03-15 RX ADMIN — Medication 1 MILLIGRAM(S): at 11:50

## 2025-03-15 RX ADMIN — Medication 1 MILLIGRAM(S): at 12:18

## 2025-03-15 RX ADMIN — Medication 1 MILLIGRAM(S): at 01:20

## 2025-03-15 RX ADMIN — INSULIN LISPRO 2: 100 INJECTION, SOLUTION INTRAVENOUS; SUBCUTANEOUS at 16:58

## 2025-03-15 RX ADMIN — INSULIN LISPRO 6 UNIT(S): 100 INJECTION, SOLUTION INTRAVENOUS; SUBCUTANEOUS at 16:58

## 2025-03-15 RX ADMIN — Medication 5 MILLIGRAM(S): at 05:12

## 2025-03-15 RX ADMIN — ATORVASTATIN CALCIUM 40 MILLIGRAM(S): 80 TABLET, FILM COATED ORAL at 21:24

## 2025-03-15 RX ADMIN — Medication 3 MILLIGRAM(S): at 21:24

## 2025-03-15 RX ADMIN — Medication 81 MILLIGRAM(S): at 11:53

## 2025-03-15 RX ADMIN — METOPROLOL SUCCINATE 25 MILLIGRAM(S): 50 TABLET, EXTENDED RELEASE ORAL at 08:29

## 2025-03-15 RX ADMIN — Medication 1 MILLIGRAM(S): at 22:25

## 2025-03-15 RX ADMIN — INSULIN LISPRO 6 UNIT(S): 100 INJECTION, SOLUTION INTRAVENOUS; SUBCUTANEOUS at 08:27

## 2025-03-15 RX ADMIN — Medication 1 MILLIGRAM(S): at 21:24

## 2025-03-15 RX ADMIN — CLOPIDOGREL BISULFATE 75 MILLIGRAM(S): 75 TABLET, FILM COATED ORAL at 11:54

## 2025-03-15 RX ADMIN — Medication 1 MILLIGRAM(S): at 02:20

## 2025-03-15 NOTE — CHART NOTE - NSCHARTNOTEFT_GEN_A_CORE
Reviewed BG levels/chart notes.     Fasting glucose 85 this AM on serum, 99mg/dl on FS.     Recommend reduce Lantus 15 units QHS to prevent hypoglycemia.     Endocrine to continue to follow.     Any inquiries please email [email or web address]   office:  [phone] (M-F 9a-5pm)               [phone] (nights/weekends)   Can access [employer] coverage via sunrise/tools

## 2025-03-15 NOTE — PROGRESS NOTE ADULT - SUBJECTIVE AND OBJECTIVE BOX
Name of Patient : TOMASZ ALBA  MRN: [id number]      Subjective: Patient seen and examined. No new events except as noted.     REVIEW OF SYSTEMS:    CONSTITUTIONAL: No weakness, fevers or chills  EYES/ENT: No visual changes;  No vertigo or throat pain   NECK: No pain or stiffness  RESPIRATORY: No cough, wheezing, hemoptysis; No shortness of breath  CARDIOVASCULAR: No chest pain or palpitations  GASTROINTESTINAL: No abdominal or epigastric pain. No nausea, vomiting, or hematemesis; No diarrhea or constipation. No melena or hematochezia.  GENITOURINARY: No dysuria, frequency or hematuria  NEUROLOGICAL: No numbness or weakness  SKIN: No itching, burning, rashes, or lesions   All other review of systems is negative unless indicated above.    MEDICATIONS:  MEDICATIONS  (STANDING):  aspirin enteric coated 81 milliGRAM(s) Oral daily  atorvastatin 40 milliGRAM(s) Oral at bedtime  benzocaine/menthol Lozenge 1 Lozenge Oral once  bisacodyl 5 milliGRAM(s) Oral every 12 hours  chlorhexidine 4% Liquid 1 Application(s) Topical <User Schedule>  clopidogrel Tablet 75 milliGRAM(s) Oral daily  clopidogrel Tablet      dextrose 5%. 1000 milliLiter(s) (100 mL/Hr) IV Continuous <Continuous>  dextrose 5%. 1000 milliLiter(s) (50 mL/Hr) IV Continuous <Continuous>  dextrose 50% Injectable 25 Gram(s) IV Push once  dextrose 50% Injectable 12.5 Gram(s) IV Push once  dextrose 50% Injectable 25 Gram(s) IV Push once  glucagon  Injectable 1 milliGRAM(s) IntraMuscular once  insulin glargine Injectable (LANTUS) 15 Unit(s) SubCutaneous at bedtime  insulin lispro (ADMELOG) corrective regimen sliding scale   SubCutaneous three times a day before meals  insulin lispro (ADMELOG) corrective regimen sliding scale   SubCutaneous at bedtime  insulin lispro Injectable (ADMELOG) 6 Unit(s) SubCutaneous three times a day with meals  metoprolol succinate ER 25 milliGRAM(s) Oral daily  Nephro-rober 1 Tablet(s) Oral daily  pantoprazole  Injectable 40 milliGRAM(s) IV Push daily  polyethylene glycol 3350 17 Gram(s) Oral daily  senna 2 Tablet(s) Oral at bedtime      PHYSICAL EXAM:  T(C): 37.4 (03-15-25 @ 20:30), Max: 37.4 (03-15-25 @ 20:30)  HR: 99 (03-15-25 @ 20:30) (93 - 102)  BP: 102/66 (03-15-25 @ 20:30) (102/66 - 105/66)  RR: 18 (03-15-25 @ 20:30) (16 - 18)  SpO2: 93% (03-15-25 @ 20:30) (92% - 94%)  Wt(kg): --  I&O's Summary    14 Mar 2025 07:01  -  15 Mar 2025 07:00  --------------------------------------------------------  IN: 360 mL / OUT: 2025 mL / NET: -1665 mL    15 Mar 2025 07:01  -  16 Mar 2025 00:03  --------------------------------------------------------  IN: 0 mL / OUT: 450 mL / NET: -450 mL    Appearance: Normal	  HEENT:  PERRLA   Lymphatic: No lymphadenopathy   Cardiovascular: Normal S1 S2, no JVD  Respiratory: normal effort , clear  Gastrointestinal:  Soft, Non-tender  Skin: No rashes,  warm to touch  Psychiatry:  Mood & affect appropriate  Musculuskeletal: No edema    recent labs, Imaging and EKGs personally reviewed     03-14-25 @ 07:01  -  03-15-25 @ 07:00  --------------------------------------------------------  IN: 360 mL / OUT: 2025 mL / NET: -1665 mL    03-15-25 @ 07:01  -  03-16-25 @ 00:03  --------------------------------------------------------  IN: 0 mL / OUT: 450 mL / NET: -450 mL                          9.8    9.62  )-----------( 191      ( 14 Mar 2025 05:46 )             28.4               03-15    136  |  97  |  19  ----------------------------<  85  4.0   |  24  |  1.80[H]    Ca    8.6      15 Mar 2025 07:12  Phos  2.5     03-14  Mg     2.0     03-15                         Urinalysis Basic - ( 15 Mar 2025 07:12 )    Color: x / Appearance: x / SG: x / pH: x  Gluc: 85 mg/dL / Ketone: x  / Bili: x / Urobili: x   Blood: x / Protein: x / Nitrite: x   Leuk Esterase: x / RBC: x / WBC x   Sq Epi: x / Non Sq Epi: x / Bacteria: x

## 2025-03-15 NOTE — PROGRESS NOTE ADULT - SUBJECTIVE AND OBJECTIVE BOX
MR#[id number]  PATIENT NAME:COLE ALBA    DATE OF SERVICE: 03-15-25 @ 08:01  Patient was seen and examined by Yordy Gilmore MD on    03-15-25 @ 08:01 .  Interim events noted.Consultant notes ,Labs,Telemetry reviewed by me       HOSPITAL COURSE: HPI:  64F, hx of CHF (last TTE on 1/16/25 EF 30-35%, G2DD), DM, diabetic foot ulcer with a recent admission at [facility] for CHF exacerbation presented as a transfer for worsening R. leg pain and fluid secretion, On non-invasive imaging demonstrating severe depletion of RLE blood flow beyond the popliteal vessel at knee and similar findings in L. Was transferred to [facility] for CO2 angiogram with Dr. Baltazar. (29 Jan 2025 20:05)      INTERIM EVENTS:Patient seen at bedside ,interim events noted.  02/14-Had cath Multivessel CAD started on Milrinone for CTS evaluation albeit targets not optimal  02/15-Awake on Milrinone and Bumex gtt-seen by CTS not a surgical candidate-needs Vascular work-up for LE PAD-scheduled for Angiogram on Wednesday    Weight 170Kg---> 164 Kg--->161.1 Kg---151.2 ---> 155 --> 154.7 ---> 158 --> 148--->147 >148 --> 146 --> 141---> 147  Kg    02/25-s/p RLE angiogram with pop and AT angioplasty   02/27-Awake had iHD plan for High risk PCI oLM/LAD tomorrow 1000mL removed  Had ? pAF ~~3 secs  02/28-Awake no dyspnea chest pain plan for PCI-oLM/LAD todat HD after PCI-Sinus rhythm  03/01-Had LHC PCWP-15 still elevated with low BP Plan to dialyse over weekend and plan for PCI on Monday-No dyspnea  03/02-Awake had HD removed 1500Ml no dyspnea Plan for PCI tomorrow  03/03-Awake Sinus rhythm no dyspnea FOR High risk PCI oLM/LAD   03/04-Awake had HD last night-1500mL removed For High risk PCI today  03/05-Awake s/p LHC VERA x 1 to LAD, VERA to LM , POBA to CX via RFA  03/06-Awake no dyspnea chest pain  03/07-Awake poor UOP plan to continue HD  03/08-Awake Sinus Rhythm CTPA No PE Had HD 1500mL removed  03/09 Awake no dyspnea Sinus Rhythm-Had HD 2900  mL removed noted to be Tachy -Sinus~~120's  03/10-Awake not orthopneac less Tachy~~100's  03/11-Awake Had HD 1500mL removed episode sinus bradycardia  03/12-Awake had Tunnelled HD catheter placed  03/13-Had HD 1500mL noted barrington episodes Sinus at rest   03/14-Awake Seen by EP continue BBlockers-Plan for Right AKA Discussed with Primary Vascular and Family  03/15-Had HD For Right AKA on Monday          PMH -reviewed admission note, no change since admission  HEART FAILURE: Acute[x ]Chronic[ ] Systolic[x ] Diastolic[ ] Combined Systolic and Diastolic[ ]  CAD[x ] CABG[ ] PCI[ ]  DEVICES[ ] PPM[ ] ICD[ ] ILR[ ]  ATRIAL FIBRILLATION[ ] Paroxysmal[ ] Permanent[ ] CHADS2-[  ]  JAMEL[x ] CKD1[ ] CKD2[ ] CKD3[ ] CKD4[x ] ESRD[ ]  COPD[ ] HTN[x ]   DM[x ] Type1[ ] Type 2[x ]   CVA[ ] Paresis[ ]    AMBULATION: Assisted[x ] Cane/walker[ ] Independent[ ]            MEDICATIONS  (STANDING):  aspirin enteric coated 81 milliGRAM(s) Oral daily  atorvastatin 40 milliGRAM(s) Oral at bedtime  benzocaine/menthol Lozenge 1 Lozenge Oral once  bisacodyl 5 milliGRAM(s) Oral every 12 hours  chlorhexidine 4% Liquid 1 Application(s) Topical <User Schedule>  clopidogrel Tablet 75 milliGRAM(s) Oral daily  clopidogrel Tablet      glucagon  Injectable 1 milliGRAM(s) IntraMuscular once  insulin glargine Injectable (LANTUS) 17 Unit(s) SubCutaneous at bedtime  insulin lispro (ADMELOG) corrective regimen sliding scale   SubCutaneous three times a day before meals  insulin lispro (ADMELOG) corrective regimen sliding scale   SubCutaneous at bedtime  insulin lispro Injectable (ADMELOG) 6 Unit(s) SubCutaneous three times a day with meals  metoprolol succinate ER 25 milliGRAM(s) Oral daily  Nephro-rober 1 Tablet(s) Oral daily  pantoprazole  Injectable 40 milliGRAM(s) IV Push daily  polyethylene glycol 3350 17 Gram(s) Oral daily  senna 2 Tablet(s) Oral at bedtime    MEDICATIONS  (PRN):  acetaminophen     Tablet .. 650 milliGRAM(s) Oral every 6 hours PRN Mild Pain (1 - 3)  dextrose Oral Gel 15 Gram(s) Oral once PRN Blood Glucose LESS THAN 70 milliGRAM(s)/deciliter  HYDROmorphone  Injectable 1 milliGRAM(s) IV Push every 6 hours PRN Severe Pain (7 - 10)  melatonin 3 milliGRAM(s) Oral at bedtime PRN Insomnia  ondansetron Injectable 4 milliGRAM(s) IV Push every 6 hours PRN Nausea and/or Vomiting  sodium chloride 0.65% Nasal 1 Spray(s) Both Nostrils three times a day PRN Dryness  sodium chloride 0.9% lock flush 10 milliLiter(s) IV Push every 1 hour PRN Pre/post blood products, medications, blood draw, and to maintain line patency            REVIEW OF SYSTEMS:  Constitutional: [ ] fever, [ ]weight loss,  [ x]fatigue [ ]weight gain  Eyes: [ ] visual changes  Respiratory: [ ]shortness of breath;  [ ] cough, [ ]wheezing, [ ]chills, [ ]hemoptysis  Cardiovascular: [ ] chest pain, [ ]palpitations, [ ]dizziness,  [ ]leg swelling[ ]orthopnea[ ]PND  Gastrointestinal: [ ] abdominal pain, [ ]nausea, [ ]vomiting,  [ ]diarrhea [ ]Constipation [ ]Melena  Genitourinary: [ ] dysuria, [ ] hematuria [ ][place]  Neurologic: [ ] headaches [ ] tremors[ ]weakness [ ]Paralysis Right[ ] Left[ ]  Skin: [ ] itching, [ ]burning, [ ] rashes  Endocrine: [ ] heat or cold intolerance  Musculoskeletal: [ ] joint pain or swelling; [ ] muscle, back, or extremity pain  Psychiatric: [ ] depression, [ ]anxiety, [ ]mood swings, or [ ]difficulty sleeping  Hematologic: [ ] easy bruising, [ ] bleeding gums    [ ] All remaining systems negative except as per above.   [ ]Unable to obtain.  [x] No change in ROS since admission      Vital Signs Last 24 Hrs  T(C): 36.7 (15 Mar 2025 04:19), Max: 36.7 (15 Mar 2025 04:19)  T(F): 98.1 (15 Mar 2025 04:19), Max: 98.1 (15 Mar 2025 04:19)  HR: 93 (15 Mar 2025 04:19) (87 - 93)  BP: 105/66 (15 Mar 2025 04:19) (95/59 - 131/68)  RR: 16 (15 Mar 2025 04:19) (16 - 16)  SpO2: 92% (15 Mar 2025 04:19) (92% - 99%)    Parameters below as of 15 Mar 2025 04:19  Patient On (Oxygen Delivery Method): room air      I&O's Summary    14 Mar 2025 07:01  -  15 Mar 2025 07:00  --------------------------------------------------------  IN: 360 mL / OUT: 2025 mL / NET: -1665 mL        PHYSICAL EXAM:  General: No acute distress BMI-28  HEENT: EOMI, PERRL  Neck: Supple, [ ] JVD  Lungs: Equal air entry bilaterally; [ ] rales [ ] wheezing [ ] rhonchi  Heart: Regular rate and rhythm; [x ] murmur   2/6 [ x] systolic [ ] diastolic [ ] radiation[ ] rubs [ ]  gallops  Abdomen: Nontender, bowel sounds present  Extremities: No clubbing, cyanosis, [ ] edema [x] RLE extensive gangrenous changes to foot dorsum, ankle, heel dorsomedial and lateral lower leg, ischemic changes to 2nd digit and hallux,  Left foot hallux distal tuft well adhered eschar, no acute signs of infection.   Nervous system:  Alert & Oriented X3, no focal deficits  Psychiatric: Normal affect  Skin: No rashes or lesions    LABS:  03-15    136  |  97  |  19  ----------------------------<  85  4.0   |  24  |  1.80[H]    Ca    8.6      15 Mar 2025 07:12  Phos  2.5     03-14  Mg     2.0     03-15      Creatinine Trend: 1.80<--, 2.99<--, 3.61<--, 3.08<--, 4.40<--, 3.84<--                        9.8    9.62  )-----------( 191      ( 14 Mar 2025 05:46 )             28.4       12 Lead ECG (03.11.25 @ 04:35) >  SINUS RHYTHM WITHMARKED SINUS ARRHYTHMIA  POSSIBLE ANTERIOR INFARCT , AGE UNDETERMINED  ABNORMAL ECG         TTE W or WO Ultrasound Enhancing Agent (03.10.25 @ 12:48) >  CONCLUSIONS:      1. Left ventricular systolic function is severely decreased with an ejection fraction of 28 % by 3D. Regional wall motion abnormalities present.   2. Multiple segmental abnormalities exist.The basal and mid anterior septum, basal and mid inferior septum, basal and mid inferior wall, and mid inferolateral segment are akinetic.  The entire apex, entire anterior wall, basal and mid anterolateral wall, and basal inferolateral segment are hypokinetic.   3. Reduced right ventricular systolic function.   4. Compared to the transthoracic echocardiogram performed on 2/19/2025, there have been no significant interval changes.   5. Right pleural effusion noted.       VA Duplex Lower Ext Vein Scan, Bilat (03.10.25 @ 13:55) >    IMPRESSION:  No evidence of deep venous thrombosis in either lower extremity.

## 2025-03-15 NOTE — PROGRESS NOTE ADULT - SUBJECTIVE AND OBJECTIVE BOX
[facility] NEPHROLOGY- PROGRESS NOTE    64y Female with history of CHF presents as a transfer for LE CO2 angiogram. Nephrology consulted for elevated Scr.      REVIEW OF SYSTEMS:  Gen: no fevers  Cards: no chest pain  Resp: no dyspnea  GI: no nausea or vomiting or diarrhea  Vascular: + LE edema    Allergies:  Augmentin (Stomach Upset; Vomiting; Nausea)        Hospital Medications: Medications reviewed      VITALS:  T(F): 97.2 (03-15-25 @ 11:12), Max: 98.1 (03-15-25 @ 04:19)  HR: 99 (03-15-25 @ 11:12)  BP: 104/63 (03-15-25 @ 11:12)  RR: 18 (03-15-25 @ 11:12)  SpO2: 94% (03-15-25 @ 11:12)  Wt(kg): --    03-14 @ 07:01  -  03-15 @ 07:00  --------------------------------------------------------  IN: 360 mL / OUT: 2025 mL / NET: -1665 mL    03-15 @ 07:01  -  03-15 @ 14:00  --------------------------------------------------------  IN: 0 mL / OUT: 450 mL / NET: -450 mL        PHYSICAL EXAM:  Gen: NAD, calm  Cards: RRR, +S1/S2, no M/G/R  Resp: Clear lungs bilaterally.  GI: soft, NT/ND, NABS  Vascular: + LE wrapped B/L  + RIJ TDC placed on 3/11/25.      LABS:  03-15    Na+ trend: 136 <--, 128 <--, 130 <--, 133 <--, 132 <--, 131 <--    136  |  97  |  19  ----------------------------<  85  4.0   |  24  |  1.80[H]    Ca    8.6      15 Mar 2025 07:12  Phos  2.5     03-14  Mg     2.0     03-15      Creatinine Trend: 1.80 <--, 2.99 <--, 3.61 <--, 3.08 <--, 4.40 <--, 3.84 <--                        9.8    9.62  )-----------( 191      ( 14 Mar 2025 05:46 )             28.4     Urine Studies:  Urinalysis Basic - ( 15 Mar 2025 07:12 )    Color:  / Appearance:  / SG:  / pH:   Gluc: 85 mg/dL / Ketone:   / Bili:  / Urobili:    Blood:  / Protein:  / Nitrite:    Leuk Esterase:  / RBC:  / WBC    Sq Epi:  / Non Sq Epi:  / Bacteria:

## 2025-03-15 NOTE — PROGRESS NOTE ADULT - ASSESSMENT
64y Female with history of CHF presents as a transfer for LE CO2 angiogram. Nephrology consulted for elevated Scr.    1) JAMEL: likely due to ATN and CRS for which patient initiated on RRT on 2/20. JAMEL exacerbated by contrast nephropathy HD on 3/12 tolerated well with 1.5L removed. HD on 3/15. S/P TDC placement on 3/11. S/P straight cath for urinary retention. Monitor serial bladder scans. Pre-HD Scr improving suggestive of renal recovery. Avoid nephrotoxins. Monitor volume status, electrolytes, urine output to determine if or when additional dialysis treatments are needed.    2) HTN: BP low normal. On midodrine pre-HD to avoid intradialytic hypotension.    3) LE edema: Off diuretics. UF with HD. TTE with severely decreased LVSF. Monitor UO.     4) Anemia: Hb improving with low TSAT. No IV iron given elevated ferritin. S/P Epo 8K X 1 dose 3/12. Monitor Hb.    5) Hyponatremia: Secondary to JAMEL and polydipsia. High dialysate Na bath. Continue with 1L FR.      [facility] NEPHROLOGY  Raji Waterman M.D.  Mars Feliz D.O.  Kelley Parks M.D.  MD Nancy Kulkarni, MSN, ANP-C    Telephone: [phone]  Facsimile: [phone]    [facility] [place] [address], [address]  [place] [address]

## 2025-03-15 NOTE — PROGRESS NOTE ADULT - ASSESSMENT
63 YO F with PMHx of HFrEF 30-35 and grade 2 ddfxn (last TTE on 01/16/25), DM2, and diabetic foot ulcer. Recent admission at [facility] for ADHF. Patient now represents to OSH and ultimately to [facility] as a transfer for worsening right leg pain and fluid secretion. As per documentation, patient was reported to have severe depletion of RLE blood flow beyond the popliteal vessel at knee and similar findings in the left. Patient was transferred to [facility] for CO2 angiogram with Dr. Baltazar. Of note, patient also with reported visual disturbance for which patient was seen and evaluated by opthalmology. Internal Medicine has been consulted on Ms. Kirby's care for medical management.       # Foot ulcers with worsening RLE pain second to pop artery occlusion +/- diabetic ulcers   - RLE Arterial Duplex with popliteal artery occlusion   - LLE Arterial Duplex with underlying disease cannot be excluded   - s/p angio with pop and AT VERA on 2/24   - c/b necrosis of RLE   - Pending vascular AKA  - Continue on Lipitor and DAPT  - Continue pain control with oxy  - Wound care called for dressing recs   - Vascular following, plan for AKA vS Nancy, ischemic LE   - patient is medically optimized for or, elevated risk . discussed with card and vascualr team       # ADHF/ BiV HFrEF 20   - Recent admission at [facility] for ADHF and on dobutamine outpatient  - TTE 1/16 with EF 30-35 with moderately reduced LVSF and grade 2 ddfxn, normal RVSF , trace TR/ HI, and trace pericardial effusion  - RPT TTE with EF 20, severely decreased LV, decreased RVSF with TAPSE 1.2, and regional wall motion abnormalities present with entire septum, entire apex, entire inferior wall, mid inferolateral segment, and mid anterolateral segment are hypokinetic.   - RHC with RA 20, PA 49/29 (40), PCWP 35, mVO2 51.1, CO/CI 3.8/2.2, SVR 1095 w/ Multivessel CAD   - s/p zaroxyln 10 QD to augment diuresis   - s/p bumex GTT   - s/p HTS to augment diuresis   - RPT limited TTE w/ LVSF severely decreased, reduced RVSF, LVOT VTI 12, mild to mod TR, and mildly elevated right atrial pressure  - s/p bumex 4 IV QD, but anuric and now dc'ed   - Case discussed with EP and needs to be on GDMT for 3 months before AICD placement and should be stabilized off of inotropic support.    - RPT ECHO post cath with EF 28 %, regional WMA, multiple segmental abnormalities exist, and reduced RVSF    - Case discussed with HF and cards and monitoring off milrinone GTT   - Continue on toprol 25 and hydral on nonHD days  - Continue volume removal with HD   - Monitor I and O   - Monitor volume status   - Check daily weights    - Monitor on telemetry  - Monitor electrolytes   - Cardio, HF, Renal, and EP evals appreciated; F/u recs     # Bradycardia   - SB to 48 BPM at 0400 while asleep on 3/11 and likely related to BB , however last dose prior to event was 3/10 at 0600.  - EP called and pending recommendations   - Monitor telemetry    # Tachycardia and Hypoxia  - Tachycardiac and on RA with SPO2 running 90-92   - Could be second to low cardiac output   - CTA with no PE  - Duplex negative for DVT   - On Toprol XL 25 PO QD.   - Monitor HR   - Monitor closely on Tele  - Cardio eval appreciated; F/u recs    # JAMEL with Hyponatremia and Metabolic Acidosis   - Baseline CRE 0.7-1.2 and increased to ~4  - As per OSH documentation, JAMEL was thought to be second to Unasyn/ Bumex / Entresto use and Hypotension   - US RENAL with no hydronephrosis  - Likely cardiorenal induced with low flow state in ADHF, however now rising again and concern for milrinone vs overdiuresis (prerenal) vs cardiorenal.   - Milrinone adjusted and diuresis turned off, however no improvement/ worsened in renal function noted.   - s/p shiley placement and started on HD 2/20  - s/p permacath 3/11  - Continue on HD per renal  - Avoid nephrotoxic agents  - Monitor Cr and daily BMP   - Renal and HF evals appreciated; F/u recs    # CAD with TVD   - NST abnormal with small-sized, moderate defect in apical wall that is predominantly fixed suggestive of an infarction with minimal marcus-infarct ischemia. Post stress LVEF 25.  - s/p LHC with RCA , severe ostial LM disease, diffuse disease in LAD and LCx, some L to R collaterals (Multivessel CAD)  - Case discussed with CTSx and NOT a candidate for CABG due to comorbidities  - Cardiac MRI with greater than 75% subendocardial scarring of the basal to mid-inferior wall of the LV and 50% scarring of the left ventricular basal inferoseptal wall. There is also left ventricular transmural scarring involving the apical septal wall and likely near transmural scarring of the apical lateral wall.  - s/p RPT LHC 3/4 with LM 80 s/p POBA/ VERA with LM 1, pLAD 90 s/p POBA/ VERA with pLAD 1, and Cx 80 s/p POBA with Cx 10.   - Continue on DAPT and Statin   - IC, Cards and HF following     # BL LE Edema likely in setting of ADHF  - Remove volume as per Cardio, HF and Renal   - BL LE elevation and compression  - LE Duplex neg   - Monitor for now    # Pericardial effusion likely in setting ADHF  - TTE with trace pericardial effusion   - CT Chest with small pericardial effusion   - Denies chest pain, palpitations, chest tightness or discomfort, shortness of breath or dyspnea   - Repeat serial TTE for further evaluation   - Diuresis per cardio/ renal.  - Monitor on telemetry  - Cardio following    # Pleural effusion likely in setting ADHF  - CT Chest with small BL pleural effusions  - Monitor O2 saturation  - Supplement to maintain > 90%   - Diuresis / HD per cardio/ renal.     # Hypernatremia to hyponatremia  - Hypernatremia likely from HTS vs over diuresis/ intravascular dehydration. HTS and diuresis stopped with improved sodium   - Hyponatremia now noted second to volume overload   - Avoid overcorrection > 6-8 mEq in 24 hours  - Monitor sodium with HD    # Transaminitis  - Likely 2/2 hepatic congestion   - Volume removal with HD as tolerated  - Trend LFTs, if uptrend post-HD initiation, check ABD US  - Serial ABD Examinations    # Leukocytosis likely in setting of BL LE ulcers with pop occlusion   - BCx negative   - UCx with probable contamination   - S/P unasyn for ABX.   - Leukocytosis fluctuating, however appears nontoxic with no fever spikes and monitoring closely off ABX  - If febrile check pan cultures   - Trend CBC, temp curve, VS and adjust as tolerated      # Visual Disturbance  - CT Head w/ old infarcts  - On ASA and Statin   - Opthalmology eval appreciated    # Indigestion and Constipation   - PPI, miralax and senna continued  - Monitor BMs    # Anemia likely mixed AOCD vs iron deficiency   - HH stable in 9s on HSQ  - Anemia panel with AOCD   - Started on venofer, but ferritin high and now stopped   - Continue on EPO with HD per renal  - Monitor HH   - Transfuse for Hgb < 8  - Maintain active T/S    # Diabetes Mellitus A1C 11.6   - Continue on lantus 13 with lispro 5 and ISS   - Diabetic DASH diet   - Monitor and adjust glucose levels PRN   - Endocrine following; F/u recs     # HLD and HLD  - Continue on lipitor and toprol  - Monitor BP    # DISPO TBD  - Palliative care called and patient remains FULL CODE     Patient seen and examined by me. patient care and plan discussed and reviewed with PA. Plan as outlined above edited by me to reflect our discussion. Advanced care planning/advanced directives discussed with patient/family. DNR status including forceful chest compressions to attempt to restart the heart, ventilator support/artificial breathing, electric shock, artificial nutrition, health care proxy, Molst form all discussed with pt. Sixty seven minutes of total time dedicated to this patient visit today including preparing to see the patient (eg. review of tests), obtaining and/or reviewing separately obtained history, obtaining/reviewing vitals, performing a medically appropriate examination and/or evaluation, counseling and educating the patient/family/caregiver, reviewing previous notes and test results, and procedures, communicating with other health professionals (when not separately reported), and documenting clinical information in the electronic health record.

## 2025-03-15 NOTE — PROGRESS NOTE ADULT - ASSESSMENT
64F, hx of CHF (last TTE on 1/16/25 EF 30-35%, G2DD), DM, diabetic foot ulcer with a recent admission at [facility] for CHF exacerbation 1/14-1/17 p/w worsening R. leg pain and fluid secretion, was meeting sepsis criteria with podiatry having low suspicion for right cellulitis and admitted for continued management of HF exacerbation and needing ischemic eval.     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  -Left leg Arterial Duplex: Slightly tardus parvus waveform of the left popliteal artery is noted, for which underlying disease cannot be excluded. Posterior tibial artery waveform is nonpulsatile. Anterior tibial artery tardus parvus flow is noted.   Transferred to [facility] for CO2 angiogram as per vascular surgery    #  PAD Wound of lower extremity.   ·  Plan: Podiatry following, b/l serous bullae, no concerns for infection  Found to have RLE coolness  -Right Leg Arterial Duplex: Popliteal artery is occluded with negligible flow of right trifurcation arteries.  -Left leg Arterial Duplex: Slightly tardus parvus waveform of the left popliteal artery is noted, for which underlying disease cannot be excluded. Posterior tibial artery waveform is nonpulsatile. Anterior tibial artery tardus parvus flow is noted.  -Started on heparin gtt and dobutamine gtt -Will transfer to [facility] for CO2 angiogram as per vascular surgery as not candidate for CTA due to worsening SCr  -Keep compression dressing  -LE elevation above heart level at rest.  -Transferred to [facility] for CO2 angiogram   - Overall this patient is at   intermediate  risk (for cardiac death, nonfatal myocardial infarction, and nonfatal cardiac arrest perioperatively for this intermediate  risk procedure).   No cardiac contraindications for CO2 vangiogram  There  are  no further recommendation for risk stratifying imaging/stress testing prior to planned surgery  Seen by Podiatry-No acute pod intervention, local wound care only-   PAD with rest ischemia  s/p AT/Popliteal angioplasty on DAPT  - RLE extensive gangrnous changes to foot dorsum, ankle, heel dorsomedial and lateral lower leg, ischemic changes to 2nd digit and hallux,  Left foot hallux distal tuft well adhered eschar, no acute signs of infection.   -Seen by vascular Plan for AKA  Vascular planning for Right AKA Discussed with Vascular she is as in optimal cardiac condition as possible to undergo surgery under [facility]  Her HF is compensated and has had PCI  - Overall this patient is at   intermediate to high but not prohobitive risk (for cardiac death, nonfatal myocardial infarction, and nonfatal cardiac arrest perioperatively for this intermediate risk procedure).     The patient is however without evidence of ACS, Decompensated Heart Failure,Obstructive Valvular Heart disease or Unstable arrhythmia.   There  are  no further recommendation for risk stratifying imaging/stress testing prior to planned surgery  She is in as optimal condition as possible for the procedure      # HFrEF (congestive heart failure). Ischemic Cardiomyopathy  ·  Plan: Hx of HF, previous admission in January, on home Lasix 40 qD, Metoprolol Succ 25 qD. Not on Entresto due to insurance issues  Last TTE: LVSF moderately decreased w/ EF 30-35 %. Moderate G2DD. Mild MR  Stress test: small-sized, moderate defect(s) in the apical wall that is predominantly fixed suggestive of an infarction with minimal marcus-infarct ischemia  Ischemic cardiomyopathy  GDMT on hold 2/2 JAMEL  Repeat TTE EF 20% with WMA RAP~~8  Repeat Limited TTE  Taper off Milrinone and start GDMT  Is currently off Hydral/Isdn and Bumex 2/2 soft BP  Continue HD with UF had 2900mL removed yesterday will stop Milrinone and observe  Started  low dose BBlocker Metoprolol tartate 12.5 mg PO q8  03/10-Off Midodrine will reattempt Hyd/Isdn  03/11-Daily HD/UF   03/12-Awake s/p Tunelled HD catheter-Plan for Discharge to [facility]  03/13-EP evaluation Arias episodes  03/14-Increasing necrosis noted Right LE Vascular planning for Right AKA   03/15-Right AKA on Monday    # CAD  LHC-RCA , severe ostial LM disease, diffuse disease in LAD and LCx, some L to R collaterals-seen by CTS advise cMR for viability poor target vessels for CABG-?High risk LM/LAD PCI  Had cMR-LVEF is 25%, greater than 75% subendocardial scarring involving the basal to mid inferior wall of the left ventricle, left ventricular transmural scarring involving the apical septal wall and likely near transmural scarring of the apical lateral wall. 50% scarring of the left ventricular basal inferoseptal wall.  03/05-s/p PCI-LM/LAD   Continue  uninterrupted DAPT        # JAMEL on CKD   Creatinine Trend: 1.80<--2.99 <--3.61 <--3.08<-- 4.40 <--3.84<--, 3.05<--, 4.46<--, 3.63<--, 2.35<--, 2.36<---3.38<--(HD)-- 4.76<--4.07<---3.90<-- 1.17  Has had 3 sessions HD for interrmittent HD to allow contrast based procedures is non oliguric  Plan to continue HD likely will need extended RRT  Srini molina on 03/11  [facility]

## 2025-03-16 LAB
GLUCOSE BLDC GLUCOMTR-MCNC: 243 MG/DL — HIGH (ref 70–99)
GLUCOSE BLDC GLUCOMTR-MCNC: 243 MG/DL — HIGH (ref 70–99)
GLUCOSE BLDC GLUCOMTR-MCNC: 248 MG/DL — HIGH (ref 70–99)
GLUCOSE BLDC GLUCOMTR-MCNC: 259 MG/DL — HIGH (ref 70–99)
GLUCOSE BLDC GLUCOMTR-MCNC: 266 MG/DL — HIGH (ref 70–99)

## 2025-03-16 RX ORDER — HYDROMORPHONE/SOD CHLOR,ISO/PF 2 MG/10 ML
1 SYRINGE (ML) INJECTION EVERY 4 HOURS
Refills: 0 | Status: DISCONTINUED | OUTPATIENT
Start: 2025-03-16 | End: 2025-03-17

## 2025-03-16 RX ORDER — INSULIN GLARGINE-YFGN 100 [IU]/ML
16 INJECTION, SOLUTION SUBCUTANEOUS AT BEDTIME
Refills: 0 | Status: DISCONTINUED | OUTPATIENT
Start: 2025-03-16 | End: 2025-03-16

## 2025-03-16 RX ORDER — ALPRAZOLAM 0.5 MG
0.25 TABLET, EXTENDED RELEASE 24 HR ORAL DAILY
Refills: 0 | Status: DISCONTINUED | OUTPATIENT
Start: 2025-03-16 | End: 2025-03-17

## 2025-03-16 RX ORDER — INSULIN GLARGINE-YFGN 100 [IU]/ML
13 INJECTION, SOLUTION SUBCUTANEOUS AT BEDTIME
Refills: 0 | Status: DISCONTINUED | OUTPATIENT
Start: 2025-03-16 | End: 2025-03-17

## 2025-03-16 RX ORDER — ALPRAZOLAM 0.5 MG
0.25 TABLET, EXTENDED RELEASE 24 HR ORAL ONCE
Refills: 0 | Status: DISCONTINUED | OUTPATIENT
Start: 2025-03-16 | End: 2025-03-16

## 2025-03-16 RX ADMIN — INSULIN LISPRO 6 UNIT(S): 100 INJECTION, SOLUTION INTRAVENOUS; SUBCUTANEOUS at 12:21

## 2025-03-16 RX ADMIN — INSULIN LISPRO 6 UNIT(S): 100 INJECTION, SOLUTION INTRAVENOUS; SUBCUTANEOUS at 15:41

## 2025-03-16 RX ADMIN — Medication 1 MILLIGRAM(S): at 05:02

## 2025-03-16 RX ADMIN — Medication 1 APPLICATION(S): at 08:13

## 2025-03-16 RX ADMIN — Medication 1 MILLIGRAM(S): at 21:30

## 2025-03-16 RX ADMIN — INSULIN LISPRO 6 UNIT(S): 100 INJECTION, SOLUTION INTRAVENOUS; SUBCUTANEOUS at 08:13

## 2025-03-16 RX ADMIN — CLOPIDOGREL BISULFATE 75 MILLIGRAM(S): 75 TABLET, FILM COATED ORAL at 12:18

## 2025-03-16 RX ADMIN — Medication 81 MILLIGRAM(S): at 12:18

## 2025-03-16 RX ADMIN — Medication 3 MILLIGRAM(S): at 23:47

## 2025-03-16 RX ADMIN — Medication 0.25 MILLIGRAM(S): at 20:37

## 2025-03-16 RX ADMIN — ATORVASTATIN CALCIUM 40 MILLIGRAM(S): 80 TABLET, FILM COATED ORAL at 20:38

## 2025-03-16 RX ADMIN — Medication 0.25 MILLIGRAM(S): at 15:43

## 2025-03-16 RX ADMIN — INSULIN LISPRO 3: 100 INJECTION, SOLUTION INTRAVENOUS; SUBCUTANEOUS at 15:40

## 2025-03-16 RX ADMIN — INSULIN LISPRO 2: 100 INJECTION, SOLUTION INTRAVENOUS; SUBCUTANEOUS at 12:20

## 2025-03-16 RX ADMIN — INSULIN LISPRO 2: 100 INJECTION, SOLUTION INTRAVENOUS; SUBCUTANEOUS at 08:12

## 2025-03-16 RX ADMIN — Medication 1 MILLIGRAM(S): at 06:05

## 2025-03-16 RX ADMIN — Medication 1 MILLIGRAM(S): at 11:12

## 2025-03-16 RX ADMIN — Medication 1 MILLIGRAM(S): at 20:36

## 2025-03-16 RX ADMIN — INSULIN GLARGINE-YFGN 13 UNIT(S): 100 INJECTION, SOLUTION SUBCUTANEOUS at 21:32

## 2025-03-16 RX ADMIN — Medication 1 TABLET(S): at 12:18

## 2025-03-16 NOTE — PROGRESS NOTE ADULT - SUBJECTIVE AND OBJECTIVE BOX
MR#[id number]  PATIENT NAME:COLE ALBA    DATE OF SERVICE: 03-16-25 @ 07:40  Patient was seen and examined by Yordy Gilmore MD on    03-16-25 @ 07:40 .  Interim events noted.Consultant notes ,Labs,Telemetry reviewed by me       HOSPITAL COURSE: HPI:  64F, hx of CHF (last TTE on 1/16/25 EF 30-35%, G2DD), DM, diabetic foot ulcer with a recent admission at [facility] for CHF exacerbation presented as a transfer for worsening R. leg pain and fluid secretion, On non-invasive imaging demonstrating severe depletion of RLE blood flow beyond the popliteal vessel at knee and similar findings in L. Was transferred to [facility] for CO2 angiogram with Dr. Baltazar. (29 Jan 2025 20:05)      INTERIM EVENTS:Patient seen at bedside ,interim events noted.  02/14-Had cath Multivessel CAD started on Milrinone for CTS evaluation albeit targets not optimal  02/15-Awake on Milrinone and Bumex gtt-seen by CTS not a surgical candidate-needs Vascular work-up for LE PAD-scheduled for Angiogram on Wednesday    Weight 170Kg---> 164 Kg--->161.1 Kg---151.2 ---> 155 --> 154.7 ---> 158 --> 148--->147 >148 --> 146 --> 141---> 147  Kg    02/25-s/p RLE angiogram with pop and AT angioplasty   02/27-Awake had iHD plan for High risk PCI oLM/LAD tomorrow 1000mL removed  Had ? pAF ~~3 secs  02/28-Awake no dyspnea chest pain plan for PCI-oLM/LAD todat HD after PCI-Sinus rhythm  03/01-Had LHC PCWP-15 still elevated with low BP Plan to dialyse over weekend and plan for PCI on Monday-No dyspnea  03/02-Awake had HD removed 1500Ml no dyspnea Plan for PCI tomorrow  03/03-Awake Sinus rhythm no dyspnea FOR High risk PCI oLM/LAD   03/04-Awake had HD last night-1500mL removed For High risk PCI today  03/05-Awake s/p LHC VERA x 1 to LAD, VERA to LM , POBA to CX via RFA  03/06-Awake no dyspnea chest pain  03/07-Awake poor UOP plan to continue HD  03/08-Awake Sinus Rhythm CTPA No PE Had HD 1500mL removed  03/09 Awake no dyspnea Sinus Rhythm-Had HD 2900  mL removed noted to be Tachy -Sinus~~120's  03/10-Awake not orthopneac less Tachy~~100's  03/11-Awake Had HD 1500mL removed episode sinus bradycardia  03/12-Awake had Tunnelled HD catheter placed  03/13-Had HD 1500mL noted barrington episodes Sinus at rest   03/14-Awake Seen by EP continue Yisel-Plan for Right AKA Discussed with Primary Vascular and Family  03/15-Had HD For Right AKA on Monday 03/16-Awake not orthopneac Sinus Rhythm For OR RIGHT AKA vs BKA tomorrow      PMH -reviewed admission note, no change since admission  HEART FAILURE: Acute[x ]Chronic[ ] Systolic[x ] Diastolic[ ] Combined Systolic and Diastolic[ ]  CAD[x ] CABG[ ] PCI[ ]  DEVICES[ ] PPM[ ] ICD[ ] ILR[ ]  ATRIAL FIBRILLATION[ ] Paroxysmal[ ] Permanent[ ] CHADS2-[  ]  JAMEL[x ] CKD1[ ] CKD2[ ] CKD3[ ] CKD4[x ] ESRD[ ]  COPD[ ] HTN[x ]   DM[x ] Type1[ ] Type 2[x ]   CVA[ ] Paresis[ ]    AMBULATION: Assisted[x ] Cane/walker[ ] Independent[ ]              MEDICATIONS  (STANDING):  aspirin enteric coated 81 milliGRAM(s) Oral daily  atorvastatin 40 milliGRAM(s) Oral at bedtime  benzocaine/menthol Lozenge 1 Lozenge Oral once  bisacodyl 5 milliGRAM(s) Oral every 12 hours  chlorhexidine 4% Liquid 1 Application(s) Topical <User Schedule>  clopidogrel Tablet 75 milliGRAM(s) Oral daily  clopidogrel Tablet      dextrose 5%. 1000 milliLiter(s) (100 mL/Hr) IV Continuous <Continuous>  dextrose 5%. 1000 milliLiter(s) (50 mL/Hr) IV Continuous <Continuous>  dextrose 50% Injectable 25 Gram(s) IV Push once  dextrose 50% Injectable 12.5 Gram(s) IV Push once  dextrose 50% Injectable 25 Gram(s) IV Push once  glucagon  Injectable 1 milliGRAM(s) IntraMuscular once  insulin glargine Injectable (LANTUS) 15 Unit(s) SubCutaneous at bedtime  insulin lispro (ADMELOG) corrective regimen sliding scale   SubCutaneous at bedtime  insulin lispro (ADMELOG) corrective regimen sliding scale   SubCutaneous three times a day before meals  insulin lispro Injectable (ADMELOG) 6 Unit(s) SubCutaneous three times a day with meals  metoprolol succinate ER 25 milliGRAM(s) Oral daily  Nephro-rober 1 Tablet(s) Oral daily  pantoprazole  Injectable 40 milliGRAM(s) IV Push daily  polyethylene glycol 3350 17 Gram(s) Oral daily  senna 2 Tablet(s) Oral at bedtime    MEDICATIONS  (PRN):  acetaminophen     Tablet .. 650 milliGRAM(s) Oral every 6 hours PRN Mild Pain (1 - 3)  dextrose Oral Gel 15 Gram(s) Oral once PRN Blood Glucose LESS THAN 70 milliGRAM(s)/deciliter  HYDROmorphone  Injectable 1 milliGRAM(s) IV Push every 6 hours PRN Severe Pain (7 - 10)  melatonin 3 milliGRAM(s) Oral at bedtime PRN Insomnia  ondansetron Injectable 4 milliGRAM(s) IV Push every 6 hours PRN Nausea and/or Vomiting  sodium chloride 0.65% Nasal 1 Spray(s) Both Nostrils three times a day PRN Dryness  sodium chloride 0.9% lock flush 10 milliLiter(s) IV Push every 1 hour PRN Pre/post blood products, medications, blood draw, and to maintain line patency            REVIEW OF SYSTEMS:  Constitutional: [ ] fever, [ ]weight loss,  [x ]fatigue [ ]weight gain  Eyes: [ ] visual changes  Respiratory: [ ]shortness of breath;  [ ] cough, [ ]wheezing, [ ]chills, [ ]hemoptysis  Cardiovascular: [ ] chest pain, [ ]palpitations, [ ]dizziness,  [ ]leg swelling[ ]orthopnea[ ]PND  Gastrointestinal: [ ] abdominal pain, [ ]nausea, [ ]vomiting,  [ ]diarrhea [ ]Constipation [ ]Melena  Genitourinary: [ ] dysuria, [ ] hematuria [ ][place]  Neurologic: [ ] headaches [ ] tremors[ ]weakness [ ]Paralysis Right[ ] Left[ ]  Skin: [ ] itching, [ ]burning, [ ] rashes  Endocrine: [ ] heat or cold intolerance  Musculoskeletal: [ ] joint pain or swelling; [ ] muscle, back, or extremity pain  Psychiatric: [ ] depression, [ ]anxiety, [ ]mood swings, or [ ]difficulty sleeping  Hematologic: [ ] easy bruising, [ ] bleeding gums    [ ] All remaining systems negative except as per above.   [ ]Unable to obtain.  [x] No change in ROS since admission      Vital Signs Last 24 Hrs  T(C): 36.7 (16 Mar 2025 04:02), Max: 37.4 (15 Mar 2025 20:30)  T(F): 98.1 (16 Mar 2025 04:02), Max: 99.3 (15 Mar 2025 20:30)  HR: 95 (16 Mar 2025 05:06) (91 - 102)  BP: 101/63 (16 Mar 2025 05:06) (98/60 - 104/67)  RR: 18 (16 Mar 2025 04:02) (18 - 18)  SpO2: 91% (16 Mar 2025 04:02) (91% - 94%)    Parameters below as of 16 Mar 2025 04:02  Patient On (Oxygen Delivery Method): room air      I&O's Summary    15 Mar 2025 07:01  -  16 Mar 2025 07:00  --------------------------------------------------------  IN: 0 mL / OUT: 850 mL / NET: -850 mL        PHYSICAL EXAM:  General: No acute distress BMI-27  HEENT: EOMI, PERRL  Neck: Supple, [ ] JVD  Lungs: Equal air entry bilaterally; [ ] rales [ ] wheezing [ ] rhonchi  Heart: Regular rate and rhythm; [x ] murmur   2/6 [ x] systolic [ ] diastolic [ ] radiation[ ] rubs [ ]  gallops  Abdomen: Nontender, bowel sounds present  Extremities: No clubbing, cyanosis, [x ] edema [x ]  RLE extensive gangrenous changes to foot dorsum, ankle, heel dorsomedial and lateral lower leg, ischemic changes to 2nd digit and hallux,  Left foot hallux distal tuft well adhered eschar, no acute signs of infection.   Nervous system:  Alert & Oriented X3, no focal deficits  Psychiatric: Normal affect  Skin: No rashes or lesions    LABS:  03-15    136  |  97  |  19  ----------------------------<  85  4.0   |  24  |  1.80[H]    Ca    8.6      15 Mar 2025 07:12  Mg     2.0     03-15      Creatinine Trend: 1.80<--, 2.99<--, 3.61<--, 3.08<--, 4.40<--, 3.84<--        12 Lead ECG (03.11.25 @ 04:35) >  SINUS RHYTHM WITHMARKED SINUS ARRHYTHMIA  POSSIBLE ANTERIOR INFARCT , AGE UNDETERMINED  ABNORMAL ECG         TTE W or WO Ultrasound Enhancing Agent (03.10.25 @ 12:48) >  CONCLUSIONS:      1. Left ventricular systolic function is severely decreased with an ejection fraction of 28 % by 3D. Regional wall motion abnormalities present.   2. Multiple segmental abnormalities exist.The basal and mid anterior septum, basal and mid inferior septum, basal and mid inferior wall, and mid inferolateral segment are akinetic.  The entire apex, entire anterior wall, basal and mid anterolateral wall, and basal inferolateral segment are hypokinetic.   3. Reduced right ventricular systolic function.   4. Compared to the transthoracic echocardiogram performed on 2/19/2025, there have been no significant interval changes.   5. Right pleural effusion noted.       VA Duplex Lower Ext Vein Scan, Bilat (03.10.25 @ 13:55) >    IMPRESSION:  No evidence of deep venous thrombosis in either lower extremity.      Cardiac Catheterization (03.04.25 @ 09:07) >  Conclusions:   Impella placed for HR-PCI (pt was turned down for CABG). EF 25%     Severe proximal LCx stenosis s/p successful rotational atherectomy and balloon angioplasty.   Severe LM and proximal LAD stenosis s/p successful rotational atherectomy, balloon angioplasty, and VERA x2.

## 2025-03-16 NOTE — PROGRESS NOTE ADULT - ASSESSMENT
64y Female with history of CHF presents as a transfer for LE CO2 angiogram. Nephrology consulted for elevated Scr.    1) JAMEL: likely due to ATN and CRS for which patient initiated on RRT on 2/20. JAMEL exacerbated by contrast nephropathy HD on 3/12 tolerated well with 1.5L removed. HD on 3/15. S/P TDC placement on 3/11. S/P straight cath for urinary retention. Monitor serial bladder scans. Pre-HD Scr improving suggestive of renal recovery. Avoid nephrotoxins. Monitor volume status, electrolytes, urine output to determine if or when additional dialysis treatments are needed. Next planned dialysis is on 3/17, prior to planned Vascular Surgery procedure. Will provide short dialysis treatment for additional clearance and reducing risk of hypervolemia after operation.    2) HTN: BP low normal. On midodrine pre-HD to avoid intradialytic hypotension.    3) LE edema: Off diuretics. UF with HD. TTE with severely decreased LVSF. Monitor UO.     4) Anemia: Hb improving with low TSAT. No IV iron given elevated ferritin. S/P Epo 8K X 1 dose 3/12. Monitor Hb.    5) Hyponatremia: Secondary to JAMEL and polydipsia. High dialysate Na bath. Continue with 1L .      [facility] NEPHROLOGY  Raji Waterman M.D.  Mars Feliz D.O.  Kelley Parks M.D.  MD Nancy Kulkarni, MSN, ANP-C    Telephone: [phone]  Facsimile: [phone]    [facility] [place] [address], [address]  [place] [address]

## 2025-03-16 NOTE — PROGRESS NOTE ADULT - SUBJECTIVE AND OBJECTIVE BOX
Name of Patient : TOMASZ ALBA  MRN: [id number]      Subjective: Patient seen and examined. No new events except as noted.   Doing okay   OR tomorrow     REVIEW OF SYSTEMS:    CONSTITUTIONAL: No weakness, fevers or chills  EYES/ENT: No visual changes;  No vertigo or throat pain   NECK: No pain or stiffness  RESPIRATORY: No cough, wheezing, hemoptysis; No shortness of breath  CARDIOVASCULAR: No chest pain or palpitations  GASTROINTESTINAL: No abdominal or epigastric pain. No nausea, vomiting, or hematemesis; No diarrhea or constipation. No melena or hematochezia.  GENITOURINARY: No dysuria, frequency or hematuria  NEUROLOGICAL: No numbness or weakness  SKIN: No itching, burning, rashes, or lesions   All other review of systems is negative unless indicated above.    MEDICATIONS:  MEDICATIONS  (STANDING):  aspirin enteric coated 81 milliGRAM(s) Oral daily  atorvastatin 40 milliGRAM(s) Oral at bedtime  benzocaine/menthol Lozenge 1 Lozenge Oral once  bisacodyl 5 milliGRAM(s) Oral every 12 hours  chlorhexidine 4% Liquid 1 Application(s) Topical <User Schedule>  clopidogrel Tablet 75 milliGRAM(s) Oral daily  clopidogrel Tablet      dextrose 5%. 1000 milliLiter(s) (100 mL/Hr) IV Continuous <Continuous>  dextrose 5%. 1000 milliLiter(s) (50 mL/Hr) IV Continuous <Continuous>  dextrose 50% Injectable 25 Gram(s) IV Push once  dextrose 50% Injectable 12.5 Gram(s) IV Push once  dextrose 50% Injectable 25 Gram(s) IV Push once  glucagon  Injectable 1 milliGRAM(s) IntraMuscular once  insulin glargine Injectable (LANTUS) 13 Unit(s) SubCutaneous at bedtime  insulin lispro (ADMELOG) corrective regimen sliding scale   SubCutaneous three times a day before meals  insulin lispro (ADMELOG) corrective regimen sliding scale   SubCutaneous at bedtime  insulin lispro Injectable (ADMELOG) 6 Unit(s) SubCutaneous three times a day with meals  metoprolol succinate ER 25 milliGRAM(s) Oral daily  Nephro-rober 1 Tablet(s) Oral daily  pantoprazole  Injectable 40 milliGRAM(s) IV Push daily  polyethylene glycol 3350 17 Gram(s) Oral daily  senna 2 Tablet(s) Oral at bedtime      PHYSICAL EXAM:  T(C): 37.4 (03-16-25 @ 20:35), Max: 37.4 (03-16-25 @ 20:35)  HR: 105 (03-16-25 @ 20:35) (91 - 105)  BP: 116/67 (03-16-25 @ 20:35) (98/60 - 116/67)  RR: 18 (03-16-25 @ 20:35) (18 - 18)  SpO2: 91% (03-16-25 @ 20:35) (91% - 92%)  Wt(kg): --  I&O's Summary    15 Mar 2025 07:01  -  16 Mar 2025 07:00  --------------------------------------------------------  IN: 0 mL / OUT: 850 mL / NET: -850 mL    16 Mar 2025 07:01  -  17 Mar 2025 00:00  --------------------------------------------------------  IN: 360 mL / OUT: 0 mL / NET: 360 mL          Appearance: Normal	  HEENT:  PERRLA   Lymphatic: No lymphadenopathy   Cardiovascular: Normal S1 S2, no JVD  Respiratory: normal effort , clear  Gastrointestinal:  Soft, Non-tender  Skin: No rashes,  warm to touch  Psychiatry:  Mood & affect appropriate  Musculuskeletal: LE dressing     recent labs, Imaging and EKGs personally reviewed       03-15-25 @ 07:01  -  03-16-25 @ 07:00  --------------------------------------------------------  IN: 0 mL / OUT: 850 mL / NET: -850 mL    03-16-25 @ 07:01  -  03-17-25 @ 00:00  --------------------------------------------------------  IN: 360 mL / OUT: 0 mL / NET: 360 mL                03-15    136  |  97  |  19  ----------------------------<  85  4.0   |  24  |  1.80[H]    Ca    8.6      15 Mar 2025 07:12  Mg     2.0     03-15                         Urinalysis Basic - ( 15 Mar 2025 07:12 )    Color: x / Appearance: x / SG: x / pH: x  Gluc: 85 mg/dL / Ketone: x  / Bili: x / Urobili: x   Blood: x / Protein: x / Nitrite: x   Leuk Esterase: x / RBC: x / WBC x   Sq Epi: x / Non Sq Epi: x / Bacteria: x

## 2025-03-16 NOTE — PRE PROCEDURE NOTE - PRE PROCEDURE EVALUATION
Preop Dx: Chronic Limb Ischemia  Surgeon: Giovanny  Procedure: Right Above Knee Amputation    Vital Signs Last 24 Hrs  T(C): 36.6 (16 Mar 2025 11:16), Max: 37.4 (15 Mar 2025 20:30)  T(F): 97.9 (16 Mar 2025 11:16), Max: 99.3 (15 Mar 2025 20:30)  HR: 93 (16 Mar 2025 11:16) (91 - 99)  BP: 109/65 (16 Mar 2025 11:16) (98/60 - 109/65)  BP(mean): --  RR: 18 (16 Mar 2025 11:16) (18 - 18)  SpO2: 92% (16 Mar 2025 11:16) (91% - 93%)    Parameters below as of 16 Mar 2025 11:16  Patient On (Oxygen Delivery Method): room air        03-15    136  |  97  |  19  ----------------------------<  85  4.0   |  24  |  1.80[H]    Ca    8.6      15 Mar 2025 07:12  Mg     2.0     03-15        Daily     Daily Weight in k (16 Mar 2025 04:02)      Type and Screen: Ordered        A/P: 64y Female     - OR 2025 for Right Above Knee Amputation with Dr. Baltazar  - NPO past midnight, except medications  - IVF while NPO  - Consent signed and in chart   - Medical and cardiac clearance for OR

## 2025-03-16 NOTE — PROGRESS NOTE ADULT - ASSESSMENT
64F, hx of CHF (last TTE on 1/16/25 EF 30-35%, G2DD), DM, diabetic foot ulcer with a recent admission at [facility] for CHF exacerbation 1/14-1/17 p/w worsening R. leg pain and fluid secretion, was meeting sepsis criteria with podiatry having low suspicion for right cellulitis and admitted for continued management of HF exacerbation and needing ischemic eval.     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  -Left leg Arterial Duplex: Slightly tardus parvus waveform of the left popliteal artery is noted, for which underlying disease cannot be excluded. Posterior tibial artery waveform is nonpulsatile. Anterior tibial artery tardus parvus flow is noted.   Transferred to [facility] for CO2 angiogram as per vascular surgery    #  PAD Wound of lower extremity.   ·  Plan: Podiatry following, b/l serous bullae, no concerns for infection  Found to have RLE coolness  -Right Leg Arterial Duplex: Popliteal artery is occluded with negligible flow of right trifurcation arteries.  -Left leg Arterial Duplex: Slightly tardus parvus waveform of the left popliteal artery is noted, for which underlying disease cannot be excluded. Posterior tibial artery waveform is nonpulsatile. Anterior tibial artery tardus parvus flow is noted.  -Started on heparin gtt and dobutamine gtt -Will transfer to [facility] for CO2 angiogram as per vascular surgery as not candidate for CTA due to worsening SCr  -Keep compression dressing  -LE elevation above heart level at rest.  -Transferred to [facility] for CO2 angiogram   - Overall this patient is at   intermediate  risk (for cardiac death, nonfatal myocardial infarction, and nonfatal cardiac arrest perioperatively for this intermediate  risk procedure).   No cardiac contraindications for CO2 vangiogram  There  are  no further recommendation for risk stratifying imaging/stress testing prior to planned surgery  Seen by Podiatry-No acute pod intervention, local wound care only-   PAD with rest ischemia  s/p AT/Popliteal angioplasty on DAPT  - RLE extensive gangrnous changes to foot dorsum, ankle, heel dorsomedial and lateral lower leg, ischemic changes to 2nd digit and hallux,  Left foot hallux distal tuft well adhered eschar, no acute signs of infection.   -Seen by vascular Plan for AKA vs BKA   Vascular planning for Right AKA Discussed with Vascular she is as in optimal cardiac condition as possible to undergo surgery under [facility]  Her HF is compensated and has had PCI  - Overall this patient is at   intermediate to high but not prohobitive risk (for cardiac death, nonfatal myocardial infarction, and nonfatal cardiac arrest perioperatively for this intermediate risk procedure).     The patient is however without evidence of ACS, Decompensated Heart Failure,Obstructive Valvular Heart disease or Unstable arrhythmia.   There  are  no further recommendation for risk stratifying imaging/stress testing prior to planned surgery  She is in as optimal condition as possible for the procedure      # HFrEF (congestive heart failure). Ischemic Cardiomyopathy  ·  Plan: Hx of HF, previous admission in January, on home Lasix 40 qD, Metoprolol Succ 25 qD. Not on Entresto due to insurance issues  Last TTE: LVSF moderately decreased w/ EF 30-35 %. Moderate G2DD. Mild MR  Stress test: small-sized, moderate defect(s) in the apical wall that is predominantly fixed suggestive of an infarction with minimal marcus-infarct ischemia  Ischemic cardiomyopathy  GDMT on hold 2/2 JAMEL  Repeat TTE EF 20% with WMA RAP~~8  Repeat Limited TTE  Taper off Milrinone and start GDMT  Is currently off Hydral/Isdn and Bumex 2/2 soft BP  Continue HD with UF had 2900mL removed yesterday will stop Milrinone and observe  Started  low dose BBlocker Metoprolol tartate 12.5 mg PO q8  03/10-Off Midodrine will reattempt Hyd/Isdn  03/11-Daily HD/UF   03/12-Awake s/p Tunelled HD catheter-Plan for Discharge to [facility]  03/13-EP evaluation Arias episodes  03/14-Increasing necrosis noted Right LE Vascular planning for Right AKA   03/15-Right AKAvs BKA on Monday 03/16- Awake Sinus Rhythm clinically stable for OR tomorrow    # CAD  LHC-RCA , severe ostial LM disease, diffuse disease in LAD and LCx, some L to R collaterals-seen by CTS advise cMR for viability poor target vessels for CABG-?High risk LM/LAD PCI  Had cMR-LVEF is 25%, greater than 75% subendocardial scarring involving the basal to mid inferior wall of the left ventricle, left ventricular transmural scarring involving the apical septal wall and likely near transmural scarring of the apical lateral wall. 50% scarring of the left ventricular basal inferoseptal wall.  03/05-s/p PCI-LM/LAD   Continue  uninterrupted DAPT        # JAMEL on CKD   Creatinine Trend: 1.80<--2.99 <--3.61 <--3.08<-- 4.40 <--3.84<--, 3.05<--, 4.46<--, 3.63<--, 2.35<--, 2.36<---3.38<--(HD)-- 4.76<--4.07<---3.90<-- 1.17  Has had 3 sessions HD for interrmittent HD to allow contrast based procedures is non oliguric  Plan to continue HD likely will need extended RRT  Srini molina on 03/11  Nemesio

## 2025-03-16 NOTE — PROGRESS NOTE ADULT - ASSESSMENT
63 YO F with PMHx of HFrEF 30-35 and grade 2 ddfxn (last TTE on 01/16/25), DM2, and diabetic foot ulcer. Recent admission at [facility] for ADHF. Patient now represents to OSH and ultimately to [facility] as a transfer for worsening right leg pain and fluid secretion. As per documentation, patient was reported to have severe depletion of RLE blood flow beyond the popliteal vessel at knee and similar findings in the left. Patient was transferred to [facility] for CO2 angiogram with Dr. Baltazar. Of note, patient also with reported visual disturbance for which patient was seen and evaluated by opthalmology. Internal Medicine has been consulted on Ms. Kirby's care for medical management.       # Foot ulcers with worsening RLE pain second to pop artery occlusion +/- diabetic ulcers   - RLE Arterial Duplex with popliteal artery occlusion   - LLE Arterial Duplex with underlying disease cannot be excluded   - s/p angio with pop and AT VERA on 2/24   - c/b necrosis of RLE   - Pending vascular AKA  - Continue on Lipitor and DAPT  - Continue pain control with oxy  - Wound care called for dressing recs   - Vascular following, plan for AKA vS Nancy, ischemic LE   - patient is medically optimized for or, elevated risk . discussed with card and vascualr team       # ADHF/ BiV HFrEF 20   - Recent admission at [facility] for ADHF and on dobutamine outpatient  - TTE 1/16 with EF 30-35 with moderately reduced LVSF and grade 2 ddfxn, normal RVSF , trace TR/ VT, and trace pericardial effusion  - RPT TTE with EF 20, severely decreased LV, decreased RVSF with TAPSE 1.2, and regional wall motion abnormalities present with entire septum, entire apex, entire inferior wall, mid inferolateral segment, and mid anterolateral segment are hypokinetic.   - RHC with RA 20, PA 49/29 (40), PCWP 35, mVO2 51.1, CO/CI 3.8/2.2, SVR 1095 w/ Multivessel CAD   - s/p zaroxyln 10 QD to augment diuresis   - s/p bumex GTT   - s/p HTS to augment diuresis   - RPT limited TTE w/ LVSF severely decreased, reduced RVSF, LVOT VTI 12, mild to mod TR, and mildly elevated right atrial pressure  - s/p bumex 4 IV QD, but anuric and now dc'ed   - Case discussed with EP and needs to be on GDMT for 3 months before AICD placement and should be stabilized off of inotropic support.    - RPT ECHO post cath with EF 28 %, regional WMA, multiple segmental abnormalities exist, and reduced RVSF    - Case discussed with HF and cards and monitoring off milrinone GTT   - Continue on toprol 25 and hydral on nonHD days  - Continue volume removal with HD   - Monitor I and O   - Monitor volume status   - Check daily weights    - Monitor on telemetry  - Monitor electrolytes   - Cardio, HF, Renal, and EP evals appreciated; F/u recs     # Bradycardia   - SB to 48 BPM at 0400 while asleep on 3/11 and likely related to BB , however last dose prior to event was 3/10 at 0600.  - EP called and pending recommendations   - Monitor telemetry    # Tachycardia and Hypoxia  - Tachycardiac and on RA with SPO2 running 90-92   - Could be second to low cardiac output   - CTA with no PE  - Duplex negative for DVT   - On Toprol XL 25 PO QD.   - Monitor HR   - Monitor closely on Tele  - Cardio eval appreciated; F/u recs    # JAMEL with Hyponatremia and Metabolic Acidosis   - Baseline CRE 0.7-1.2 and increased to ~4  - As per OSH documentation, JAMEL was thought to be second to Unasyn/ Bumex / Entresto use and Hypotension   - US RENAL with no hydronephrosis  - Likely cardiorenal induced with low flow state in ADHF, however now rising again and concern for milrinone vs overdiuresis (prerenal) vs cardiorenal.   - Milrinone adjusted and diuresis turned off, however no improvement/ worsened in renal function noted.   - s/p shiley placement and started on HD 2/20  - s/p permacath 3/11  - Continue on HD per renal  - Avoid nephrotoxic agents  - Monitor Cr and daily BMP   - Renal and HF evals appreciated; F/u recs    # CAD with TVD   - NST abnormal with small-sized, moderate defect in apical wall that is predominantly fixed suggestive of an infarction with minimal marcus-infarct ischemia. Post stress LVEF 25.  - s/p LHC with RCA , severe ostial LM disease, diffuse disease in LAD and LCx, some L to R collaterals (Multivessel CAD)  - Case discussed with CTSx and NOT a candidate for CABG due to comorbidities  - Cardiac MRI with greater than 75% subendocardial scarring of the basal to mid-inferior wall of the LV and 50% scarring of the left ventricular basal inferoseptal wall. There is also left ventricular transmural scarring involving the apical septal wall and likely near transmural scarring of the apical lateral wall.  - s/p RPT LHC 3/4 with LM 80 s/p POBA/ VERA with LM 1, pLAD 90 s/p POBA/ VERA with pLAD 1, and Cx 80 s/p POBA with Cx 10.   - Continue on DAPT and Statin   - IC, Cards and HF following     # BL LE Edema likely in setting of ADHF  - Remove volume as per Cardio, HF and Renal   - BL LE elevation and compression  - LE Duplex neg   - Monitor for now    # Pericardial effusion likely in setting ADHF  - TTE with trace pericardial effusion   - CT Chest with small pericardial effusion   - Denies chest pain, palpitations, chest tightness or discomfort, shortness of breath or dyspnea   - Repeat serial TTE for further evaluation   - Diuresis per cardio/ renal.  - Monitor on telemetry  - Cardio following    # Pleural effusion likely in setting ADHF  - CT Chest with small BL pleural effusions  - Monitor O2 saturation  - Supplement to maintain > 90%   - Diuresis / HD per cardio/ renal.     # Hypernatremia to hyponatremia  - Hypernatremia likely from HTS vs over diuresis/ intravascular dehydration. HTS and diuresis stopped with improved sodium   - Hyponatremia now noted second to volume overload   - Avoid overcorrection > 6-8 mEq in 24 hours  - Monitor sodium with HD    # Transaminitis  - Likely 2/2 hepatic congestion   - Volume removal with HD as tolerated  - Trend LFTs, if uptrend post-HD initiation, check ABD US  - Serial ABD Examinations    # Leukocytosis likely in setting of BL LE ulcers with pop occlusion   - BCx negative   - UCx with probable contamination   - S/P unasyn for ABX.   - Leukocytosis fluctuating, however appears nontoxic with no fever spikes and monitoring closely off ABX  - If febrile check pan cultures   - Trend CBC, temp curve, VS and adjust as tolerated      # Visual Disturbance  - CT Head w/ old infarcts  - On ASA and Statin   - Opthalmology eval appreciated    # Indigestion and Constipation   - PPI, miralax and senna continued  - Monitor BMs    # Anemia likely mixed AOCD vs iron deficiency   - HH stable in 9s on HSQ  - Anemia panel with AOCD   - Started on venofer, but ferritin high and now stopped   - Continue on EPO with HD per renal  - Monitor HH   - Transfuse for Hgb < 8  - Maintain active T/S    # Diabetes Mellitus A1C 11.6   - Continue on lantus 13 with lispro 5 and ISS   - Diabetic DASH diet   - Monitor and adjust glucose levels PRN   - Endocrine following; F/u recs     # HLD and HLD  - Continue on lipitor and toprol  - Monitor BP    # DISPO TBD  - Palliative care called and patient remains FULL CODE     Patient seen and examined by me. patient care and plan discussed and reviewed with PA. Plan as outlined above edited by me to reflect our discussion. Advanced care planning/advanced directives discussed with patient/family. DNR status including forceful chest compressions to attempt to restart the heart, ventilator support/artificial breathing, electric shock, artificial nutrition, health care proxy, Molst form all discussed with pt. Sixty seven minutes of total time dedicated to this patient visit today including preparing to see the patient (eg. review of tests), obtaining and/or reviewing separately obtained history, obtaining/reviewing vitals, performing a medically appropriate examination and/or evaluation, counseling and educating the patient/family/caregiver, reviewing previous notes and test results, and procedures, communicating with other health professionals (when not separately reported), and documenting clinical information in the electronic health record.

## 2025-03-16 NOTE — PROGRESS NOTE ADULT - SUBJECTIVE AND OBJECTIVE BOX
[facility] NEPHROLOGY- PROGRESS NOTE    64y Female with history of CHF presents as a transfer for LE CO2 angiogram. Nephrology consulted for elevated Scr.  Patient is tired, she does not want to discuss current health plan at this time.    REVIEW OF SYSTEMS:  Gen: Fatigued. no fevers  Cards: no chest pain  Resp: no dyspnea  GI: no nausea or vomiting or diarrhea  Vascular: + LE edema    Allergies:  Augmentin (Stomach Upset; Vomiting; Nausea)      Hospital Medications: Medications reviewed      VITALS:  T(F): 97.9 (03-16-25 @ 11:16), Max: 99.3 (03-15-25 @ 20:30)  HR: 93 (03-16-25 @ 11:16)  BP: 109/65 (03-16-25 @ 11:16)  RR: 18 (03-16-25 @ 11:16)  SpO2: 92% (03-16-25 @ 11:16)  Wt(kg): --    03-15 @ 07:01  -  03-16 @ 07:00  --------------------------------------------------------  IN: 0 mL / OUT: 850 mL / NET: -850 mL        PHYSICAL EXAM:  Gen: NAD, calm  Cards: RRR, +S1/S2, no M/G/R  Resp: Clear lungs bilaterally.  GI: soft, NT/ND, NABS  Vascular: + LE wrapped B/L  + RIJ TDC placed on 3/11/25.    LABS:  03-15    136  |  97  |  19  ----------------------------<  85  4.0   |  24  |  1.80[H]    Ca    8.6      15 Mar 2025 07:12  Mg     2.0     03-15      Creatinine Trend: 1.80 <--, 2.99 <--, 3.61 <--, 3.08 <--, 4.40 <--    Urine Studies:  Urinalysis Basic - ( 15 Mar 2025 07:12 )    Color:  / Appearance:  / SG:  / pH:   Gluc: 85 mg/dL / Ketone:   / Bili:  / Urobili:    Blood:  / Protein:  / Nitrite:    Leuk Esterase:  / RBC:  / WBC    Sq Epi:  / Non Sq Epi:  / Bacteria:

## 2025-03-17 ENCOUNTER — TRANSCRIPTION ENCOUNTER (OUTPATIENT)
Age: 65
End: 2025-03-17

## 2025-03-17 ENCOUNTER — APPOINTMENT (OUTPATIENT)
Dept: VASCULAR SURGERY | Facility: HOSPITAL | Age: 65
End: 2025-03-17

## 2025-03-17 ENCOUNTER — RESULT REVIEW (OUTPATIENT)
Age: 65
End: 2025-03-17

## 2025-03-17 LAB
ANION GAP SERPL CALC-SCNC: 13 MMOL/L — SIGNIFICANT CHANGE UP (ref 5–17)
APTT BLD: 30.5 SEC — SIGNIFICANT CHANGE UP (ref 24.5–35.6)
BLD GP AB SCN SERPL QL: NEGATIVE — SIGNIFICANT CHANGE UP
BUN SERPL-MCNC: 29 MG/DL — HIGH (ref 7–23)
CALCIUM SERPL-MCNC: 8.7 MG/DL — SIGNIFICANT CHANGE UP (ref 8.4–10.5)
CHLORIDE SERPL-SCNC: 96 MMOL/L — SIGNIFICANT CHANGE UP (ref 96–108)
CO2 SERPL-SCNC: 24 MMOL/L — SIGNIFICANT CHANGE UP (ref 22–31)
CREAT SERPL-MCNC: 1.47 MG/DL — HIGH (ref 0.5–1.3)
EGFR: 40 ML/MIN/1.73M2 — LOW
EGFR: 40 ML/MIN/1.73M2 — LOW
GLUCOSE BLDC GLUCOMTR-MCNC: 200 MG/DL — HIGH (ref 70–99)
GLUCOSE BLDC GLUCOMTR-MCNC: 201 MG/DL — HIGH (ref 70–99)
GLUCOSE BLDC GLUCOMTR-MCNC: 205 MG/DL — HIGH (ref 70–99)
GLUCOSE SERPL-MCNC: 192 MG/DL — HIGH (ref 70–99)
HCT VFR BLD CALC: 29.5 % — LOW (ref 34.5–45)
HGB BLD-MCNC: 10 G/DL — LOW (ref 11.5–15.5)
INR BLD: 1.28 RATIO — HIGH (ref 0.85–1.16)
MAGNESIUM SERPL-MCNC: 2 MG/DL — SIGNIFICANT CHANGE UP (ref 1.6–2.6)
MCHC RBC-ENTMCNC: 25.3 PG — LOW (ref 27–34)
MCHC RBC-ENTMCNC: 33.9 G/DL — SIGNIFICANT CHANGE UP (ref 32–36)
MCV RBC AUTO: 74.7 FL — LOW (ref 80–100)
NRBC BLD AUTO-RTO: 0 /100 WBCS — SIGNIFICANT CHANGE UP (ref 0–0)
PHOSPHATE SERPL-MCNC: 2 MG/DL — LOW (ref 2.5–4.5)
PLATELET # BLD AUTO: 156 K/UL — SIGNIFICANT CHANGE UP (ref 150–400)
POTASSIUM SERPL-MCNC: 3.9 MMOL/L — SIGNIFICANT CHANGE UP (ref 3.5–5.3)
POTASSIUM SERPL-SCNC: 3.9 MMOL/L — SIGNIFICANT CHANGE UP (ref 3.5–5.3)
PROTHROM AB SERPL-ACNC: 14.6 SEC — HIGH (ref 9.9–13.4)
RBC # BLD: 3.95 M/UL — SIGNIFICANT CHANGE UP (ref 3.8–5.2)
RBC # FLD: 22.4 % — HIGH (ref 10.3–14.5)
RH IG SCN BLD-IMP: NEGATIVE — SIGNIFICANT CHANGE UP
SODIUM SERPL-SCNC: 133 MMOL/L — LOW (ref 135–145)
WBC # BLD: 10.58 K/UL — HIGH (ref 3.8–10.5)
WBC # FLD AUTO: 10.58 K/UL — HIGH (ref 3.8–10.5)

## 2025-03-17 PROCEDURE — 88311 DECALCIFY TISSUE: CPT | Mod: 26

## 2025-03-17 PROCEDURE — 27592 AMPUTATE LEG AT THIGH: CPT | Mod: RT

## 2025-03-17 PROCEDURE — 88307 TISSUE EXAM BY PATHOLOGIST: CPT | Mod: 26

## 2025-03-17 DEVICE — KIT A-LINE 1LUM 20G X 12CM SAFE KIT: Type: IMPLANTABLE DEVICE | Site: RIGHT | Status: FUNCTIONAL

## 2025-03-17 RX ORDER — METOPROLOL SUCCINATE 50 MG/1
25 TABLET, EXTENDED RELEASE ORAL DAILY
Refills: 0 | Status: DISCONTINUED | OUTPATIENT
Start: 2025-03-17 | End: 2025-03-27

## 2025-03-17 RX ORDER — HYDROMORPHONE/SOD CHLOR,ISO/PF 2 MG/10 ML
2 SYRINGE (ML) INJECTION EVERY 4 HOURS
Refills: 0 | Status: DISCONTINUED | OUTPATIENT
Start: 2025-03-17 | End: 2025-03-17

## 2025-03-17 RX ORDER — SODIUM CHLORIDE 9 G/1000ML
1000 INJECTION, SOLUTION INTRAVENOUS
Refills: 0 | Status: DISCONTINUED | OUTPATIENT
Start: 2025-03-17 | End: 2025-03-27

## 2025-03-17 RX ORDER — HYDROMORPHONE/SOD CHLOR,ISO/PF 2 MG/10 ML
2 SYRINGE (ML) INJECTION EVERY 4 HOURS
Refills: 0 | Status: DISCONTINUED | OUTPATIENT
Start: 2025-03-17 | End: 2025-03-18

## 2025-03-17 RX ORDER — ONDANSETRON HCL/PF 4 MG/2 ML
4 VIAL (ML) INJECTION EVERY 6 HOURS
Refills: 0 | Status: DISCONTINUED | OUTPATIENT
Start: 2025-03-17 | End: 2025-03-27

## 2025-03-17 RX ORDER — BISACODYL 5 MG
5 TABLET, DELAYED RELEASE (ENTERIC COATED) ORAL EVERY 12 HOURS
Refills: 0 | Status: DISCONTINUED | OUTPATIENT
Start: 2025-03-17 | End: 2025-03-27

## 2025-03-17 RX ORDER — HYDROMORPHONE/SOD CHLOR,ISO/PF 2 MG/10 ML
0.5 SYRINGE (ML) INJECTION
Refills: 0 | Status: DISCONTINUED | OUTPATIENT
Start: 2025-03-17 | End: 2025-03-17

## 2025-03-17 RX ORDER — B1/B2/B3/B5/B6/B12/VIT C/FOLIC 500-0.5 MG
1 TABLET ORAL DAILY
Refills: 0 | Status: DISCONTINUED | OUTPATIENT
Start: 2025-03-17 | End: 2025-03-27

## 2025-03-17 RX ORDER — ATORVASTATIN CALCIUM 80 MG/1
40 TABLET, FILM COATED ORAL AT BEDTIME
Refills: 0 | Status: DISCONTINUED | OUTPATIENT
Start: 2025-03-17 | End: 2025-03-27

## 2025-03-17 RX ORDER — INSULIN GLARGINE-YFGN 100 [IU]/ML
13 INJECTION, SOLUTION SUBCUTANEOUS AT BEDTIME
Refills: 0 | Status: DISCONTINUED | OUTPATIENT
Start: 2025-03-17 | End: 2025-03-17

## 2025-03-17 RX ORDER — INSULIN LISPRO 100 U/ML
6 INJECTION, SOLUTION INTRAVENOUS; SUBCUTANEOUS
Refills: 0 | Status: DISCONTINUED | OUTPATIENT
Start: 2025-03-17 | End: 2025-03-18

## 2025-03-17 RX ORDER — SODIUM CHLORIDE 0.65 %
1 AEROSOL, SPRAY (ML) NASAL THREE TIMES A DAY
Refills: 0 | Status: DISCONTINUED | OUTPATIENT
Start: 2025-03-17 | End: 2025-03-27

## 2025-03-17 RX ORDER — ASPIRIN 325 MG
81 TABLET ORAL DAILY
Refills: 0 | Status: DISCONTINUED | OUTPATIENT
Start: 2025-03-18 | End: 2025-03-27

## 2025-03-17 RX ORDER — HYDROMORPHONE/SOD CHLOR,ISO/PF 2 MG/10 ML
1 SYRINGE (ML) INJECTION EVERY 4 HOURS
Refills: 0 | Status: DISCONTINUED | OUTPATIENT
Start: 2025-03-17 | End: 2025-03-17

## 2025-03-17 RX ORDER — ASPIRIN 325 MG
81 TABLET ORAL DAILY
Refills: 0 | Status: DISCONTINUED | OUTPATIENT
Start: 2025-03-17 | End: 2025-03-17

## 2025-03-17 RX ORDER — DEXTROSE 50 % IN WATER 50 %
25 SYRINGE (ML) INTRAVENOUS ONCE
Refills: 0 | Status: DISCONTINUED | OUTPATIENT
Start: 2025-03-17 | End: 2025-03-27

## 2025-03-17 RX ORDER — SENNA 187 MG
2 TABLET ORAL AT BEDTIME
Refills: 0 | Status: DISCONTINUED | OUTPATIENT
Start: 2025-03-17 | End: 2025-03-27

## 2025-03-17 RX ORDER — INSULIN LISPRO 100 U/ML
INJECTION, SOLUTION INTRAVENOUS; SUBCUTANEOUS
Refills: 0 | Status: DISCONTINUED | OUTPATIENT
Start: 2025-03-17 | End: 2025-03-27

## 2025-03-17 RX ORDER — MELATONIN 5 MG
3 TABLET ORAL AT BEDTIME
Refills: 0 | Status: DISCONTINUED | OUTPATIENT
Start: 2025-03-17 | End: 2025-03-27

## 2025-03-17 RX ORDER — HYDROMORPHONE/SOD CHLOR,ISO/PF 2 MG/10 ML
1 SYRINGE (ML) INJECTION ONCE
Refills: 0 | Status: DISCONTINUED | OUTPATIENT
Start: 2025-03-17 | End: 2025-03-17

## 2025-03-17 RX ORDER — ALPRAZOLAM 0.5 MG
0.25 TABLET, EXTENDED RELEASE 24 HR ORAL DAILY
Refills: 0 | Status: DISCONTINUED | OUTPATIENT
Start: 2025-03-17 | End: 2025-03-19

## 2025-03-17 RX ORDER — DEXTROSE 50 % IN WATER 50 %
15 SYRINGE (ML) INTRAVENOUS ONCE
Refills: 0 | Status: DISCONTINUED | OUTPATIENT
Start: 2025-03-17 | End: 2025-03-27

## 2025-03-17 RX ORDER — INSULIN LISPRO 100 U/ML
INJECTION, SOLUTION INTRAVENOUS; SUBCUTANEOUS AT BEDTIME
Refills: 0 | Status: DISCONTINUED | OUTPATIENT
Start: 2025-03-17 | End: 2025-03-27

## 2025-03-17 RX ORDER — POLYETHYLENE GLYCOL 3350 17 G/17G
17 POWDER, FOR SOLUTION ORAL DAILY
Refills: 0 | Status: DISCONTINUED | OUTPATIENT
Start: 2025-03-17 | End: 2025-03-27

## 2025-03-17 RX ORDER — GLUCAGON 3 MG/1
1 POWDER NASAL ONCE
Refills: 0 | Status: DISCONTINUED | OUTPATIENT
Start: 2025-03-17 | End: 2025-03-27

## 2025-03-17 RX ORDER — INSULIN GLARGINE-YFGN 100 [IU]/ML
16 INJECTION, SOLUTION SUBCUTANEOUS AT BEDTIME
Refills: 0 | Status: DISCONTINUED | OUTPATIENT
Start: 2025-03-17 | End: 2025-03-18

## 2025-03-17 RX ORDER — CLOPIDOGREL BISULFATE 75 MG/1
75 TABLET, FILM COATED ORAL DAILY
Refills: 0 | Status: DISCONTINUED | OUTPATIENT
Start: 2025-03-17 | End: 2025-03-17

## 2025-03-17 RX ORDER — CLOPIDOGREL BISULFATE 75 MG/1
75 TABLET, FILM COATED ORAL DAILY
Refills: 0 | Status: DISCONTINUED | OUTPATIENT
Start: 2025-03-18 | End: 2025-03-27

## 2025-03-17 RX ORDER — DEXTROSE 50 % IN WATER 50 %
12.5 SYRINGE (ML) INTRAVENOUS ONCE
Refills: 0 | Status: DISCONTINUED | OUTPATIENT
Start: 2025-03-17 | End: 2025-03-27

## 2025-03-17 RX ORDER — SODIUM CHLORIDE 9 G/1000ML
1000 INJECTION, SOLUTION INTRAVENOUS
Refills: 0 | Status: DISCONTINUED | OUTPATIENT
Start: 2025-03-17 | End: 2025-03-17

## 2025-03-17 RX ORDER — ONDANSETRON HCL/PF 4 MG/2 ML
4 VIAL (ML) INJECTION ONCE
Refills: 0 | Status: DISCONTINUED | OUTPATIENT
Start: 2025-03-17 | End: 2025-03-17

## 2025-03-17 RX ORDER — INSULIN GLARGINE-YFGN 100 [IU]/ML
16 INJECTION, SOLUTION SUBCUTANEOUS AT BEDTIME
Refills: 0 | Status: DISCONTINUED | OUTPATIENT
Start: 2025-03-17 | End: 2025-03-17

## 2025-03-17 RX ADMIN — INSULIN LISPRO 6 UNIT(S): 100 INJECTION, SOLUTION INTRAVENOUS; SUBCUTANEOUS at 18:01

## 2025-03-17 RX ADMIN — Medication 0.5 MILLIGRAM(S): at 14:30

## 2025-03-17 RX ADMIN — Medication 0.5 MILLIGRAM(S): at 14:50

## 2025-03-17 RX ADMIN — Medication 0.5 MILLIGRAM(S): at 15:12

## 2025-03-17 RX ADMIN — Medication 1 MILLIGRAM(S): at 23:25

## 2025-03-17 RX ADMIN — Medication 3 MILLIGRAM(S): at 21:37

## 2025-03-17 RX ADMIN — Medication 1 MILLIGRAM(S): at 17:45

## 2025-03-17 RX ADMIN — Medication 1 MILLIGRAM(S): at 16:59

## 2025-03-17 RX ADMIN — Medication 0.5 MILLIGRAM(S): at 14:35

## 2025-03-17 RX ADMIN — INSULIN LISPRO 1: 100 INJECTION, SOLUTION INTRAVENOUS; SUBCUTANEOUS at 15:03

## 2025-03-17 RX ADMIN — INSULIN GLARGINE-YFGN 16 UNIT(S): 100 INJECTION, SOLUTION SUBCUTANEOUS at 21:39

## 2025-03-17 RX ADMIN — Medication 0.5 MILLIGRAM(S): at 14:15

## 2025-03-17 RX ADMIN — METOPROLOL SUCCINATE 25 MILLIGRAM(S): 50 TABLET, EXTENDED RELEASE ORAL at 05:14

## 2025-03-17 RX ADMIN — Medication 1 MILLIGRAM(S): at 18:49

## 2025-03-17 RX ADMIN — ATORVASTATIN CALCIUM 40 MILLIGRAM(S): 80 TABLET, FILM COATED ORAL at 21:37

## 2025-03-17 NOTE — CHART NOTE - NSCHARTNOTEFT_GEN_A_CORE
Requested by RN to remove left brachial arterial line prior to transfer to floor. Prior to removal hematoma noted at site next placed arterial line. Dr. Gonzalez at bedside, +pulses prior to removal. Arterial line removed, direct pressure to insertion site applied for 10 minutes.  Gauze and tape dressing applied to insertion site. No swelling, no bleeding present. Palpable radial and ulner pulses present after removal of catheter.  Full motor/sensory function intact.

## 2025-03-17 NOTE — PRE-OP CHECKLIST - SELECT TESTS ORDERED
189/BMP/CBC/CMP/Type and Screen/EKG/POCT Blood Glucose
BMP/CBC/PT/PTT/INR/Urinalysis/EKG/CXR/POCT Blood Glucose

## 2025-03-17 NOTE — PROGRESS NOTE ADULT - ASSESSMENT
65 YO F with PMHx of HFrEF 30-35 and grade 2 ddfxn (last TTE on 01/16/25), DM2, and diabetic foot ulcer. Recent admission at [facility] for ADHF. Patient now represents to OSH and ultimately to [facility] as a transfer for worsening right leg pain and fluid secretion. As per documentation, patient was reported to have severe depletion of RLE blood flow beyond the popliteal vessel at knee and similar findings in the left. Patient was transferred to [facility] for CO2 angiogram with Dr. Baltazar. Of note, patient also with reported visual disturbance for which patient was seen and evaluated by opthalmology. Internal Medicine has been consulted on Ms. Kirby's care for medical management.       # Foot ulcers with worsening RLE pain second to pop artery occlusion +/- diabetic ulcers   - RLE Arterial Duplex with popliteal artery occlusion   - LLE Arterial Duplex with underlying disease cannot be excluded   - s/p angio with pop and AT VERA on 2/24   - c/b necrosis of RLE   - Planned for vascular AKA 3/17   - Continue on Lipitor and DAPT  - Continue pain control with oxy  - Wound care called for dressing recs   - Vascular following, plan for AKA vS Nancy, ischemic LE   - Patient is medically optimized for OR, elevated risk. Discussed with card and vascular team       # ADHF/ BiV HFrEF 20   - Recent admission at [facility] for ADHF and on dobutamine outpatient  - TTE 1/16 with EF 30-35 with moderately reduced LVSF and grade 2 ddfxn, normal RVSF , trace TR/ SD, and trace pericardial effusion  - RPT TTE with EF 20, severely decreased LV, decreased RVSF with TAPSE 1.2, and regional wall motion abnormalities present with entire septum, entire apex, entire inferior wall, mid inferolateral segment, and mid anterolateral segment are hypokinetic.   - RHC with RA 20, PA 49/29 (40), PCWP 35, mVO2 51.1, CO/CI 3.8/2.2, SVR 1095 w/ Multivessel CAD   - s/p zaroxyln 10 QD to augment diuresis   - s/p bumex GTT   - s/p HTS to augment diuresis   - RPT limited TTE w/ LVSF severely decreased, reduced RVSF, LVOT VTI 12, mild to mod TR, and mildly elevated right atrial pressure  - s/p bumex 4 IV QD, but anuric and now dc'ed   - Case discussed with EP and needs to be on GDMT for 3 months before AICD placement and should be stabilized off of inotropic support.    - RPT ECHO post cath with EF 28 %, regional WMA, multiple segmental abnormalities exist, and reduced RVSF    - Case discussed with HF and cards and monitoring off milrinone GTT   - Continue on toprol 25 and hydral on nonHD days. EP cleared for BB  - Continue volume removal with HD   - Monitor I and O   - Monitor volume status   - Check daily weights    - Monitor on telemetry  - Monitor electrolytes   - Cardio, HF, Renal, and EP evals appreciated; F/u recs     # Bradycardia   - SB to 48 BPM at 0400 while asleep on 3/11 and likely related to BB , however last dose prior to event was 3/10 at 0600.  - Monitor telemetry  - EP eval appreciated; F/u recs --> Cleared for BB     # Tachycardia and Hypoxia  - Tachycardiac and on RA with SPO2 running 90-92   - Could be second to low cardiac output   - CTA with no PE  - Duplex negative for DVT   - On Toprol XL 25 PO QD.   - Monitor HR   - Monitor closely on Tele  - Cardio eval appreciated; F/u recs    # JAMEL with Hyponatremia and Metabolic Acidosis   - Baseline CRE 0.7-1.2 and increased to ~4  - As per OSH documentation, JAMEL was thought to be second to Unasyn/ Bumex / Entresto use and Hypotension   - US RENAL with no hydronephrosis  - Likely cardiorenal induced with low flow state in ADHF, however now rising again and concern for milrinone vs overdiuresis (prerenal) vs cardiorenal.   - Milrinone adjusted and diuresis turned off, however no improvement/ worsened in renal function noted.   - s/p shiley placement and started on HD 2/20  - s/p permacath 3/11  - Continue on HD per renal  - Avoid nephrotoxic agents  - Monitor Cr and daily BMP   - Renal and HF evals appreciated; F/u recs    # CAD with TVD   - NST abnormal with small-sized, moderate defect in apical wall that is predominantly fixed suggestive of an infarction with minimal marcus-infarct ischemia. Post stress LVEF 25.  - s/p LHC with RCA , severe ostial LM disease, diffuse disease in LAD and LCx, some L to R collaterals (Multivessel CAD)  - Case discussed with CTSx and NOT a candidate for CABG due to comorbidities  - Cardiac MRI with greater than 75% subendocardial scarring of the basal to mid-inferior wall of the LV and 50% scarring of the left ventricular basal inferoseptal wall. There is also left ventricular transmural scarring involving the apical septal wall and likely near transmural scarring of the apical lateral wall.  - s/p RPT LHC 3/4 with LM 80 s/p POBA/ VERA with LM 1, pLAD 90 s/p POBA/ VERA with pLAD 1, and Cx 80 s/p POBA with Cx 10.   - Continue on DAPT and Statin   - IC, Cards and HF following     # BL LE Edema likely in setting of ADHF  - Remove volume as per Cardio, HF and Renal   - BL LE elevation and compression  - LE Duplex neg   - Monitor for now    # Pericardial effusion likely in setting ADHF  - TTE with trace pericardial effusion   - CT Chest with small pericardial effusion   - Denies chest pain, palpitations, chest tightness or discomfort, shortness of breath or dyspnea   - Repeat serial TTE for further evaluation   - Diuresis per cardio/ renal.  - Monitor on telemetry  - Cardio following    # Pleural effusion likely in setting ADHF  - CT Chest with small BL pleural effusions  - Monitor O2 saturation  - Supplement to maintain > 90%   - Diuresis / HD per cardio/ renal.     # Hypernatremia to hyponatremia  - Hypernatremia likely from HTS vs over diuresis/ intravascular dehydration. HTS and diuresis stopped with improved sodium   - Hyponatremia now noted second to volume overload   - Avoid overcorrection > 6-8 mEq in 24 hours  - Monitor sodium with HD    # Transaminitis  - Likely 2/2 hepatic congestion   - Volume removal with HD as tolerated  - Trend LFTs, if uptrend post-HD initiation, check ABD US  - Serial ABD Examinations    # Leukocytosis likely in setting of BL LE ulcers with pop occlusion   - BCx negative   - UCx with probable contamination   - S/P unasyn for ABX.   - Leukocytosis fluctuating, however appears nontoxic with no fever spikes and monitoring closely off ABX  - If febrile check pan cultures   - Trend CBC, temp curve, VS and adjust as tolerated      # Visual Disturbance  - CT Head w/ old infarcts  - On ASA and Statin   - Opthalmology eval appreciated    # Indigestion and Constipation   - PPI, miralax and senna continued  - Monitor BMs    # Anemia likely mixed AOCD vs iron deficiency   - HH stable in 9s on HSQ  - Anemia panel with AOCD   - Started on venofer, but ferritin high and now stopped   - Continue on EPO with HD per renal  - Monitor HH   - Transfuse for Hgb < 8  - Maintain active T/S    # Diabetes Mellitus A1C 11.6   - Continue on lantus 13 with lispro 5 and ISS   - Diabetic DASH diet   - Monitor and adjust glucose levels PRN   - Endocrine following; F/u recs     # HLD and HLD  - Continue on lipitor and toprol  - Monitor BP    # DISPO TBD  - Palliative care called and patient remains FULL CODE     Discussed with Attending and ACP

## 2025-03-17 NOTE — PROGRESS NOTE ADULT - SUBJECTIVE AND OBJECTIVE BOX
SURGERY DAILY PROGRESS NOTE    24 Hour/Overnight Events: No acute events overnight    SUBJECTIVE: Patient seen and evaluated on AM rounds.   ------------------------------------------------------------------------------------------------------------  OBJECTIVE:  Vital Signs Last 24 Hrs  T(C): 37.1 (17 Mar 2025 04:13), Max: 37.4 (16 Mar 2025 20:35)  T(F): 98.8 (17 Mar 2025 04:13), Max: 99.4 (16 Mar 2025 20:35)  HR: 99 (17 Mar 2025 04:13) (93 - 105)  BP: 107/68 (17 Mar 2025 04:13) (100/61 - 116/67)  BP(mean): --  RR: 18 (17 Mar 2025 04:13) (18 - 18)  SpO2: 89% (17 Mar 2025 04:13) (89% - 92%)    Parameters below as of 17 Mar 2025 04:13  Patient On (Oxygen Delivery Method): room air      I&O's Detail    16 Mar 2025 07:01  -  17 Mar 2025 07:00  --------------------------------------------------------  IN:    Oral Fluid: 360 mL  Total IN: 360 mL    OUT:    Indwelling Catheter - Urethral (mL): 1000 mL  Total OUT: 1000 mL    Total NET: -640 mL          LABS:                        10.0   10.58 )-----------( 156      ( 17 Mar 2025 04:35 )             29.5     03-17    133[L]  |  96  |  29[H]  ----------------------------<  192[H]  3.9   |  24  |  1.47[H]    Ca    8.7      17 Mar 2025 04:35  Phos  2.0     03-17  Mg     2.0     03-17        PT/INR - ( 17 Mar 2025 04:35 )   PT: 14.6 sec;   INR: 1.28 ratio         PTT - ( 17 Mar 2025 04:35 )  PTT:30.5 sec  Urinalysis Basic - ( 17 Mar 2025 04:35 )    Color: x / Appearance: x / SG: x / pH: x  Gluc: 192 mg/dL / Ketone: x  / Bili: x / Urobili: x   Blood: x / Protein: x / Nitrite: x   Leuk Esterase: x / RBC: x / WBC x   Sq Epi: x / Non Sq Epi: x / Bacteria: x      Physical Exam:  General: NAD, resting in bed comfortably  Cardiac: Regular rate, normotensive  Respiratory: Nonlabored respirations, normal cw expansion, on RA  Neuro: AOx3, CNII-XII intact on gross examination  Vascular/Extremities: Warm, well perfused        RLE: Dressings in place, blistering and ulceration on the dorsal aspect of the foot        LLE: First digit dry gangrene on the plantar aspect

## 2025-03-17 NOTE — CHART NOTE - NSCHARTNOTEFT_GEN_A_CORE
SURGERY POST OP CHECK    STATUS POST PROCEDURE: R AKA     SUBJECTIVE: Pt seen and examined at bedside. Patient reports appropriate surgical incisional pain; pain is controlled. Denies fevers/chills, chest pain, dyspnea, nausea, vomiting  Pt is tolerating diet.     --------------------------------------------------------------------------------------------------------------------------------------------------------------------------------------------------------------  OBJECTIVE:  Vital Signs Last 24 Hrs  T(C): 36.5 (17 Mar 2025 16:26), Max: 37.4 (16 Mar 2025 20:35)  T(F): 97.7 (17 Mar 2025 16:26), Max: 99.4 (16 Mar 2025 20:35)  HR: 83 (17 Mar 2025 16:26) (80 - 105)  BP: 117/69 (17 Mar 2025 16:26) (96/51 - 117/69)  BP(mean): 76 (17 Mar 2025 15:45) (67 - 83)  RR: 18 (17 Mar 2025 16:26) (12 - 18)  SpO2: 94% (17 Mar 2025 16:26) (89% - 100%)    Parameters below as of 17 Mar 2025 16:26  Patient On (Oxygen Delivery Method): nasal cannula  O2 Flow (L/min): 3    I&O's Summary    16 Mar 2025 07:01  -  17 Mar 2025 07:00  --------------------------------------------------------  IN: 360 mL / OUT: 1000 mL / NET: -640 mL        PHYSICAL EXAM:  Constitutional: Well developed, well nourished, NAD  Chest: Symmetric chest rise bilaterally  Abdomen: Soft, nontender, nondistended,   Groin: Soft, nontender, no ecchymosis/hematoma, no erythema, no edema.  Extremities: R AKA site covered with gauze c/d/i B/L radial pulses palpable 2+. R Brachial A line site c/d/i with gauze and no hematoma noted   ----------------------------------------------------------------------------------------------------------------------------------------------------------------------------------------------------------------  ASSESSMENT: HPI:  64y.o. Female with PAD, CLTI affecting b/l LE, CHF, chronic b/l LE wound R worsen then the L, with R foot blistering and ulceration was transfer to [facility] for CO2 angiogram. Patient is currently followed by medicine and cardiology now s/p RLE angiogram with pop and AT angioplasty on 2/24/25 now s/p R AKA 3/17.     PLAN:  - Monitor brachial A line site  - Dressing off POD #3   - Diet: Regular   - Analgesia and antiemetics as needed  - Strict I&O's   - Incentive spirometry  - OOB as tolerated  - DVT prophylaxis: SCD  - Monitor overnight  - Dispo: Floor

## 2025-03-17 NOTE — PROGRESS NOTE ADULT - SUBJECTIVE AND OBJECTIVE BOX
MR#[id number]  PATIENT NAME:COLE ALBA    DATE OF SERVICE: 03-17-25 @ 07:45  Patient was seen and examined by Yordy Gilmore MD on    03-17-25 @ 07:45 .  Interim events noted.Consultant notes ,Labs,Telemetry reviewed by me       HOSPITAL COURSE: HPI:  64F, hx of CHF (last TTE on 1/16/25 EF 30-35%, G2DD), DM, diabetic foot ulcer with a recent admission at [facility] for CHF exacerbation presented as a transfer for worsening R. leg pain and fluid secretion, On non-invasive imaging demonstrating severe depletion of RLE blood flow beyond the popliteal vessel at knee and similar findings in L. Was transferred to [facility] for CO2 angiogram with Dr. Baltazar. (29 Jan 2025 20:05)    INTERIM EVENTS:Patient seen at bedside ,interim events noted.  02/14-Had cath Multivessel CAD started on Milrinone for CTS evaluation albeit targets not optimal  02/15-Awake on Milrinone and Bumex gtt-seen by CTS not a surgical candidate-needs Vascular work-up for LE PAD-scheduled for Angiogram on Wednesday    Weight 170Kg---> 164 Kg--->161.1 Kg---151.2 ---> 155 --> 154.7 ---> 158 --> 148--->147 >148 --> 146 --> 141---> 147  Kg    02/25-s/p RLE angiogram with pop and AT angioplasty   02/27-Awake had iHD plan for High risk PCI oLM/LAD tomorrow 1000mL removed  Had ? pAF ~~3 secs  02/28-Awake no dyspnea chest pain plan for PCI-oLM/LAD todat HD after PCI-Sinus rhythm  03/01-Had LHC PCWP-15 still elevated with low BP Plan to dialyse over weekend and plan for PCI on Monday-No dyspnea  03/02-Awake had HD removed 1500Ml no dyspnea Plan for PCI tomorrow  03/03-Awake Sinus rhythm no dyspnea FOR High risk PCI oLM/LAD   03/04-Awake had HD last night-1500mL removed For High risk PCI today  03/05-Awake s/p LHC VERA x 1 to LAD, VERA to LM , POBA to CX via RFA  03/06-Awake no dyspnea chest pain  03/07-Awake poor UOP plan to continue HD  03/08-Awake Sinus Rhythm CTPA No PE Had HD 1500mL removed  03/09 Awake no dyspnea Sinus Rhythm-Had HD 2900  mL removed noted to be Tachy -Sinus~~120's  03/10-Awake not orthopneac less Tachy~~100's  03/11-Awake Had HD 1500mL removed episode sinus bradycardia  03/12-Awake had Tunnelled HD catheter placed  03/13-Had HD 1500mL noted barrington episodes Sinus at rest   03/14-Awake Seen by EP continue TRICIAlockers-Plan for Right AKA Discussed with Primary Vascular and Family  03/15-Had HD For Right AKA on Monday 03/16-Awake not orthopneac Sinus Rhythm For OR RIGHT AKA vs BKA tomorrow+    03/17- Somnolent scheduled for OR today no dyspnea was anxious overnight      PMH -reviewed admission note, no change since admission  HEART FAILURE: Acute[x ]Chronic[ ] Systolic[x ] Diastolic[ ] Combined Systolic and Diastolic[ ]  CAD[x ] CABG[ ] PCI[ ]  DEVICES[ ] PPM[ ] ICD[ ] ILR[ ]  ATRIAL FIBRILLATION[ ] Paroxysmal[ ] Permanent[ ] CHADS2-[  ]  JAMEL[x ] CKD1[ ] CKD2[ ] CKD3[ ] CKD4[x ] ESRD[ ]  COPD[ ] HTN[x ]   DM[x ] Type1[ ] Type 2[x ]   CVA[ ] Paresis[ ]    AMBULATION: Assisted[x ] Cane/walker[ ] Independent[ ]          MEDICATIONS  (STANDING):  aspirin enteric coated 81 milliGRAM(s) Oral daily  atorvastatin 40 milliGRAM(s) Oral at bedtime  benzocaine/menthol Lozenge 1 Lozenge Oral once  bisacodyl 5 milliGRAM(s) Oral every 12 hours  chlorhexidine 4% Liquid 1 Application(s) Topical <User Schedule>  clopidogrel Tablet 75 milliGRAM(s) Oral daily  clopidogrel Tablet      insulin glargine Injectable (LANTUS) 13 Unit(s) SubCutaneous at bedtime  insulin lispro (ADMELOG) corrective regimen sliding scale   SubCutaneous three times a day before meals  insulin lispro (ADMELOG) corrective regimen sliding scale   SubCutaneous at bedtime  insulin lispro Injectable (ADMELOG) 6 Unit(s) SubCutaneous three times a day with meals  metoprolol succinate ER 25 milliGRAM(s) Oral daily  Nephro-rober 1 Tablet(s) Oral daily  pantoprazole  Injectable 40 milliGRAM(s) IV Push daily  polyethylene glycol 3350 17 Gram(s) Oral daily  senna 2 Tablet(s) Oral at bedtime    MEDICATIONS  (PRN):  acetaminophen     Tablet .. 650 milliGRAM(s) Oral every 6 hours PRN Mild Pain (1 - 3)  ALPRAZolam 0.25 milliGRAM(s) Oral daily PRN anxiety  dextrose Oral Gel 15 Gram(s) Oral once PRN Blood Glucose LESS THAN 70 milliGRAM(s)/deciliter  HYDROmorphone  Injectable 1 milliGRAM(s) IV Push every 4 hours PRN Severe Pain (7 - 10)  melatonin 3 milliGRAM(s) Oral at bedtime PRN Insomnia  ondansetron Injectable 4 milliGRAM(s) IV Push every 6 hours PRN Nausea and/or Vomiting  sodium chloride 0.65% Nasal 1 Spray(s) Both Nostrils three times a day PRN Dryness  sodium chloride 0.9% lock flush 10 milliLiter(s) IV Push every 1 hour PRN Pre/post blood products, medications, blood draw, and to maintain line patency            REVIEW OF SYSTEMS:  Constitutional: [ ] fever, [ ]weight loss,  [ ]fatigue [ ]weight gain  Eyes: [ ] visual changes  Respiratory: [ ]shortness of breath;  [ ] cough, [ ]wheezing, [ ]chills, [ ]hemoptysis  Cardiovascular: [ ] chest pain, [ ]palpitations, [ ]dizziness,  [ ]leg swelling[ ]orthopnea[ ]PND  Gastrointestinal: [ ] abdominal pain, [ ]nausea, [ ]vomiting,  [ ]diarrhea [ ]Constipation [ ]Melena  Genitourinary: [ ] dysuria, [ ] hematuria [ ][place]  Neurologic: [ ] headaches [ ] tremors[ ]weakness [ ]Paralysis Right[ ] Left[ ]  Skin: [ ] itching, [ ]burning, [ ] rashes  Endocrine: [ ] heat or cold intolerance  Musculoskeletal: [ ] joint pain or swelling; [ ] muscle, back, or extremity pain  Psychiatric: [ ] depression, [ ]anxiety, [ ]mood swings, or [ ]difficulty sleeping  Hematologic: [ ] easy bruising, [ ] bleeding gums    [ ] All remaining systems negative except as per above.   [ ]Unable to obtain.  [x] No change in ROS since admission      Vital Signs Last 24 Hrs  T(C): 37.1 (17 Mar 2025 04:13), Max: 37.4 (16 Mar 2025 20:35)  T(F): 98.8 (17 Mar 2025 04:13), Max: 99.4 (16 Mar 2025 20:35)  HR: 99 (17 Mar 2025 04:13) (93 - 105)  BP: 107/68 (17 Mar 2025 04:13) (100/61 - 116/67)  BP(mean): --  RR: 18 (17 Mar 2025 04:13) (18 - 18)  SpO2: 89% (17 Mar 2025 04:13) (89% - 92%)    Parameters below as of 17 Mar 2025 04:13  Patient On (Oxygen Delivery Method): room air      I&O's Summary    16 Mar 2025 07:01  -  17 Mar 2025 07:00  --------------------------------------------------------  IN: 360 mL / OUT: 1000 mL / NET: -640 mL        PHYSICAL EXAM:  General: No acute distress BMI-28  HEENT: EOMI, PERRL  Neck: Supple, [ ] JVD  Lungs: Equal air entry bilaterally; [ ] rales [ ] wheezing [ ] rhonchi  Heart: Regular rate and rhythm; [x ] murmur   2/6 [ x] systolic [ ] diastolic [ ] radiation[ ] rubs [ ]  gallops  Abdomen: Nontender, bowel sounds present  Extremities: No clubbing, cyanosis, [ ] edema [x ]RLE extensive gangrenous changes to foot dorsum, ankle, heel dorsomedial and lateral lower leg, ischemic changes to 2nd digit and hallux,  Left foot hallux distal tuft well adhered eschar, no acute signs of infection.   Nervous system:  Alert & Oriented X3, no focal deficits  Psychiatric: Normal affect  Skin: No rashes or lesions    LABS:  03-17    133[L]  |  96  |  29[H]  ----------------------------<  192[H]  3.9   |  24  |  1.47[H]    Ca    8.7      17 Mar 2025 04:35  Phos  2.0     03-17  Mg     2.0     03-17      Creatinine Trend: 1.47<--, 1.80<--, 2.99<--, 3.61<--, 3.08<--, 4.40<--                        10.0   10.58 )-----------( 156      ( 17 Mar 2025 04:35 )             29.5     PT/INR - ( 17 Mar 2025 04:35 )   PT: 14.6 sec;   INR: 1.28 ratio         PTT - ( 17 Mar 2025 04:35 )  PTT:30.5 sec        12 Lead ECG (03.11.25 @ 04:35) >  SINUS RHYTHM WITHMARKED SINUS ARRHYTHMIA  POSSIBLE ANTERIOR INFARCT , AGE UNDETERMINED  ABNORMAL ECG         TTE W or WO Ultrasound Enhancing Agent (03.10.25 @ 12:48) >  CONCLUSIONS:      1. Left ventricular systolic function is severely decreased with an ejection fraction of 28 % by 3D. Regional wall motion abnormalities present.   2. Multiple segmental abnormalities exist.The basal and mid anterior septum, basal and mid inferior septum, basal and mid inferior wall, and mid inferolateral segment are akinetic.  The entire apex, entire anterior wall, basal and mid anterolateral wall, and basal inferolateral segment are hypokinetic.   3. Reduced right ventricular systolic function.   4. Compared to the transthoracic echocardiogram performed on 2/19/2025, there have been no significant interval changes.   5. Right pleural effusion noted.       VA Duplex Lower Ext Vein Scan, Bilat (03.10.25 @ 13:55) >    IMPRESSION:  No evidence of deep venous thrombosis in either lower extremity.      Cardiac Catheterization (03.04.25 @ 09:07) >  Conclusions:   Impella placed for HR-PCI (pt was turned down for CABG). EF 25%     Severe proximal LCx stenosis s/p successful rotational atherectomy and balloon angioplasty.   Severe LM and proximal LAD stenosis s/p successful rotational atherectomy, balloon angioplasty, and VERA x2.

## 2025-03-17 NOTE — BRIEF OPERATIVE NOTE - NSICDXBRIEFPROCEDURE_GEN_ALL_CORE_FT
PROCEDURES:  AKA (above-knee amputation), right 17-Mar-2025 13:48:45  Darrion Tipton  
PROCEDURES:  Angioplasty, artery, popliteal 24-Feb-2025 12:50:31  Nelson Javier

## 2025-03-17 NOTE — PROGRESS NOTE ADULT - SUBJECTIVE AND OBJECTIVE BOX
Name of Patient : TOMASZ ALBA  MRN: [id number]  Date of visit: 03-17-25        Subjective: Patient seen and examined. No new events except as noted.   Patient seen earlier  For OR today     REVIEW OF SYSTEMS:    CONSTITUTIONAL: Generalized weakness   EYES/ENT: No visual changes;  No vertigo or throat pain   NECK: No pain or stiffness  RESPIRATORY: No cough, wheezing, hemoptysis; No shortness of breath  CARDIOVASCULAR: No chest pain or palpitations  GASTROINTESTINAL: No abdominal or epigastric pain. No nausea, vomiting, or hematemesis; No diarrhea or constipation. No melena or hematochezia.  GENITOURINARY: No dysuria, frequency or hematuria  NEUROLOGICAL: No numbness or weakness  SKIN: LE wounds   All other review of systems is negative unless indicated above.    MEDICATIONS:  MEDICATIONS  (STANDING):  atorvastatin 40 milliGRAM(s) Oral at bedtime  bisacodyl 5 milliGRAM(s) Oral every 12 hours  chlorhexidine 4% Liquid 1 Application(s) Topical <User Schedule>  dextrose 5%. 1000 milliLiter(s) (50 mL/Hr) IV Continuous <Continuous>  dextrose 5%. 1000 milliLiter(s) (100 mL/Hr) IV Continuous <Continuous>  dextrose 50% Injectable 25 Gram(s) IV Push once  dextrose 50% Injectable 12.5 Gram(s) IV Push once  dextrose 50% Injectable 25 Gram(s) IV Push once  glucagon  Injectable 1 milliGRAM(s) IntraMuscular once  insulin glargine Injectable (LANTUS) 16 Unit(s) SubCutaneous at bedtime  insulin lispro (ADMELOG) corrective regimen sliding scale   SubCutaneous three times a day before meals  insulin lispro (ADMELOG) corrective regimen sliding scale   SubCutaneous at bedtime  insulin lispro Injectable (ADMELOG) 6 Unit(s) SubCutaneous three times a day with meals  metoprolol succinate ER 25 milliGRAM(s) Oral daily  Nephro-rober 1 Tablet(s) Oral daily  pantoprazole  Injectable 40 milliGRAM(s) IV Push daily  polyethylene glycol 3350 17 Gram(s) Oral daily  senna 2 Tablet(s) Oral at bedtime      PHYSICAL EXAM:  T(C): 36.5 (03-17-25 @ 16:26), Max: 37.4 (03-16-25 @ 20:35)  HR: 83 (03-17-25 @ 16:26) (80 - 105)  BP: 117/69 (03-17-25 @ 16:26) (96/51 - 117/69)  RR: 18 (03-17-25 @ 16:26) (12 - 18)  SpO2: 94% (03-17-25 @ 16:26) (89% - 100%)  Wt(kg): --  I&O's Summary    16 Mar 2025 07:01  -  17 Mar 2025 07:00  --------------------------------------------------------  IN: 360 mL / OUT: 1000 mL / NET: -640 mL      Height (cm): 162.6 (03-17 @ 10:32)  Weight (kg): 72.1 (03-17 @ 10:32)  BMI (kg/m2): 27.3 (03-17 @ 10:32)  BSA (m2): 1.77 (03-17 @ 10:32)    Appearance: Normal	  HEENT: Eyes are open   Lymphatic: No lymphadenopathy grossly   Cardiovascular: Normal S1 S2   Respiratory: normal effort , clear  Gastrointestinal:  Soft, Non-tender  Skin: + Permcath; No rashes,  warm to touch  Psychiatry:  Mood & affect appropriate  Musculoskeletal: B/L LE wrapped in ACE wrap; LE wounds              03-16-25 @ 07:01  -  03-17-25 @ 07:00  --------------------------------------------------------  IN: 360 mL / OUT: 1000 mL / NET: -640 mL                                  10.0   10.58 )-----------( 156      ( 17 Mar 2025 04:35 )             29.5               03-17    133[L]  |  96  |  29[H]  ----------------------------<  192[H]  3.9   |  24  |  1.47[H]    Ca    8.7      17 Mar 2025 04:35  Phos  2.0     03-17  Mg     2.0     03-17      PT/INR - ( 17 Mar 2025 04:35 )   PT: 14.6 sec;   INR: 1.28 ratio         PTT - ( 17 Mar 2025 04:35 )  PTT:30.5 sec                   Urinalysis Basic - ( 17 Mar 2025 04:35 )    Color: x / Appearance: x / SG: x / pH: x  Gluc: 192 mg/dL / Ketone: x  / Bili: x / Urobili: x   Blood: x / Protein: x / Nitrite: x   Leuk Esterase: x / RBC: x / WBC x   Sq Epi: x / Non Sq Epi: x / Bacteria: x

## 2025-03-17 NOTE — PROGRESS NOTE ADULT - SUBJECTIVE AND OBJECTIVE BOX
[facility] NEPHROLOGY- PROGRESS NOTE    64y Female with history of CHF presents as a transfer for LE CO2 angiogram. Nephrology consulted for elevated Scr.      REVIEW OF SYSTEMS:  Gen: no fevers  Cards: no chest pain  Resp: no dyspnea  GI: no nausea or vomiting or diarrhea  Vascular: + LE edema    Augmentin (Stomach Upset; Vomiting; Nausea)  No Known Allergies      Hospital Medications: Medications reviewed        VITALS:  T(F): 98.8 (03-17-25 @ 04:13), Max: 99.4 (03-16-25 @ 20:35)  HR: 99 (03-17-25 @ 10:32)  BP: 107/68 (03-17-25 @ 10:32)  RR: 18 (03-17-25 @ 10:32)  SpO2: 89% (03-17-25 @ 10:32)  Wt(kg): --    03-16 @ 07:01  -  03-17 @ 07:00  --------------------------------------------------------  IN: 360 mL / OUT: 1000 mL / NET: -640 mL      Height (cm): 162.6 (03-17 @ 10:32)  Weight (kg): 72.1 (03-17 @ 10:32)  BMI (kg/m2): 27.3 (03-17 @ 10:32)  BSA (m2): 1.77 (03-17 @ 10:32)      PHYSICAL EXAM:  Gen: NAD, calm  Cards: RRR, +S1/S2, no M/G/R  Resp: bibasilar rales  GI: soft, NT/ND, NABS  : + stiles  Vascular: + LE wrapped B/L  + RIJ TDC intact        LABS:  03-17    133[L]  |  96  |  29[H]  ----------------------------<  192[H]  3.9   |  24  |  1.47[H]    Ca    8.7      17 Mar 2025 04:35  Phos  2.0     03-17  Mg     2.0     03-17      Creatinine Trend: 1.47 <--, 1.80 <--, 2.99 <--, 3.61 <--, 3.08 <--                        10.0   10.58 )-----------( 156      ( 17 Mar 2025 04:35 )             29.5     Urine Studies:  Urinalysis Basic - ( 17 Mar 2025 04:35 )    Color:  / Appearance:  / SG:  / pH:   Gluc: 192 mg/dL / Ketone:   / Bili:  / Urobili:    Blood:  / Protein:  / Nitrite:    Leuk Esterase:  / RBC:  / WBC    Sq Epi:  / Non Sq Epi:  / Bacteria:

## 2025-03-17 NOTE — BRIEF OPERATIVE NOTE - NSICDXBRIEFPOSTOP_GEN_ALL_CORE_FT
POST-OP DIAGNOSIS:  Gangrene of right lower extremity due to atherosclerosis 17-Mar-2025 13:48:34  Darrion Tipton

## 2025-03-17 NOTE — PROGRESS NOTE ADULT - ASSESSMENT
64F, hx of CHF (last TTE on 1/16/25 EF 30-35%, G2DD), DM, diabetic foot ulcer with a recent admission at [facility] for CHF exacerbation 1/14-1/17 p/w worsening R. leg pain and fluid secretion, was meeting sepsis criteria with podiatry having low suspicion for right cellulitis and admitted for continued management of HF exacerbation and needing ischemic eval.     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  -Left leg Arterial Duplex: Slightly tardus parvus waveform of the left popliteal artery is noted, for which underlying disease cannot be excluded. Posterior tibial artery waveform is nonpulsatile. Anterior tibial artery tardus parvus flow is noted.   Transferred to [facility] for CO2 angiogram as per vascular surgery    #  PAD Wound of lower extremity.   ·  Plan: Podiatry following, b/l serous bullae, no concerns for infection  Found to have RLE coolness  -Right Leg Arterial Duplex: Popliteal artery is occluded with negligible flow of right trifurcation arteries.  -Left leg Arterial Duplex: Slightly tardus parvus waveform of the left popliteal artery is noted, for which underlying disease cannot be excluded. Posterior tibial artery waveform is nonpulsatile. Anterior tibial artery tardus parvus flow is noted.  -Started on heparin gtt and dobutamine gtt -Will transfer to [facility] for CO2 angiogram as per vascular surgery as not candidate for CTA due to worsening SCr  -Keep compression dressing  -LE elevation above heart level at rest.  -Transferred to [facility] for CO2 angiogram   - Overall this patient is at   intermediate  risk (for cardiac death, nonfatal myocardial infarction, and nonfatal cardiac arrest perioperatively for this intermediate  risk procedure).   No cardiac contraindications for CO2 vangiogram  There  are  no further recommendation for risk stratifying imaging/stress testing prior to planned surgery  Seen by Podiatry-No acute pod intervention, local wound care only-   PAD with rest ischemia  s/p AT/Popliteal angioplasty on DAPT  - RLE extensive gangrnous changes to foot dorsum, ankle, heel dorsomedial and lateral lower leg, ischemic changes to 2nd digit and hallux,  Left foot hallux distal tuft well adhered eschar, no acute signs of infection.   -Seen by vascular Plan for AKA vs BKA   Vascular planning for Right AKA Discussed with Vascular she is as in optimal cardiac condition as possible to undergo surgery under [facility]  Her HF is compensated and has had PCI  - Overall this patient is at   intermediate to high but not prohobitive risk (for cardiac death, nonfatal myocardial infarction, and nonfatal cardiac arrest perioperatively for this intermediate risk procedure).     The patient is however without evidence of ACS, Decompensated Heart Failure,Obstructive Valvular Heart disease or Unstable arrhythmia.   There  are  no further recommendation for risk stratifying imaging/stress testing prior to planned surgery  She is in as optimal condition as possible for the procedure      # HFrEF (congestive heart failure). Ischemic Cardiomyopathy  ·  Plan: Hx of HF, previous admission in January, on home Lasix 40 qD, Metoprolol Succ 25 qD. Not on Entresto due to insurance issues  Last TTE: LVSF moderately decreased w/ EF 30-35 %. Moderate G2DD. Mild MR  Stress test: small-sized, moderate defect(s) in the apical wall that is predominantly fixed suggestive of an infarction with minimal marcus-infarct ischemia  Ischemic cardiomyopathy  GDMT on hold 2/2 JMAEL  Repeat TTE EF 20% with WMA RAP~~8  Repeat Limited TTE  Taper off Milrinone and start GDMT  Is currently off Hydral/Isdn and Bumex 2/2 soft BP  Continue HD with UF had 2900mL removed yesterday will stop Milrinone and observe  Started  low dose BBlocker Metoprolol tartate 12.5 mg PO q8  03/10-Off Midodrine will reattempt Hyd/Isdn  03/11-Daily HD/UF   03/12-Awake s/p Tunelled HD catheter-Plan for Discharge to [facility]  03/13-EP evaluation Arias episodes  03/14-Increasing necrosis noted Right LE Vascular planning for Right AKA   03/15-Right AKAvs BKA on Monday 03/16- Awake Sinus Rhythm clinically stable for OR tomorrow    03/17-Somnolent not dyspneac for OR today    # CAD  LHC-RCA , severe ostial LM disease, diffuse disease in LAD and LCx, some L to R collaterals-seen by CTS advise cMR for viability poor target vessels for CABG-?High risk LM/LAD PCI  Had cMR-LVEF is 25%, greater than 75% subendocardial scarring involving the basal to mid inferior wall of the left ventricle, left ventricular transmural scarring involving the apical septal wall and likely near transmural scarring of the apical lateral wall. 50% scarring of the left ventricular basal inferoseptal wall.  03/05-s/p PCI-LM/LAD   Continue  uninterrupted DAPT        # JAMEL on CKD   Creatinine Trend: 1.47 <--1.80<--2.99 <--3.61 <--3.08<-- 4.40 <--3.84<--, 3.05<--, 4.46<--, 3.63<--, 2.35<--, 2.36<---3.38<--(HD)-- 4.76<--4.07<---3.90<-- 1.17  Has had 3 sessions HD for interrmittent HD to allow contrast based procedures is non oliguric  Plan to continue HD likely will need extended RRT  Srini change on 03/11  Permacath  For short HD treatment preop

## 2025-03-17 NOTE — BRIEF OPERATIVE NOTE - NSICDXBRIEFPREOP_GEN_ALL_CORE_FT
PRE-OP DIAGNOSIS:  Peripheral artery disease 24-Feb-2025 12:50:43  Nelson Javier  
PRE-OP DIAGNOSIS:  Peripheral artery disease 24-Feb-2025 12:50:43  Nelson Javier

## 2025-03-17 NOTE — BRIEF OPERATIVE NOTE - OPERATION/FINDINGS
R Above knee amputation, n/v structures identified and suture ligated, hemostasis achieved. 
Under anesthesia, Seldinger technique used to gain access via a 5Fr short sheath via antegrade access  Guidewire advantage used and 5Fr short sheath exchange for a 5Fr x25  One vessel runoff visualized  Two lesions, one on the AT and one of the popliteal A ballooned with an over the wire balloon 2.5x100 and 4x 100   Contrast run performed showing improved flow of AT   Pressure held for 25 minutes

## 2025-03-17 NOTE — PROGRESS NOTE ADULT - ATTENDING COMMENTS
pt with severe PVD  bilateral CLTI  CHF  CAD    non salvageable right leg    I had multiple conversations with the patient and her family including her brothers regarding her need for amputation. Although she is medically and cardiac wise optimized, she remains high risk for cardiac events perioperatively.    She verbalized understanding and as such I agree to perform AKA for infection source control and rest pain palliation
agree with note as above
64F PMHx HFrEF and polyvascular dz including severe bilateral PAD and CKD who is presenting with acute on chronic decompensated systolic HF. Has reduced LV systolic function, previously recommended diagnostic LHC but patient declined. Had NM stress test with small area of anterior ischemia. I suspect etiology is more likely ischemic CM, will eventually need more definitive testing. Has not previously been on neurohoromonal therapy. She reports ongoing diabetic foot ulcers bilaterally, worsening recently i/s/o progressive LE edema. Admitted and has been undergoing diuresis; now diuresing well on current regimen. Still with significant volume overload.    Recommendations:  - MCS/inotropy: none  - GDMT: start Ald 25  - Diuretics: c/w Bumex @3, Diamox 500 daily, goal UOP 4-5L daily  - HF workup: R/LHC once euvolemic  - CAD: ASA, high-intensity statin
64F PMHx HFrEF and polyvascular dz including severe bilateral PAD and CKD who is presenting with acute on chronic decompensated systolic HF. Has reduced LV systolic function, previously recommended diagnostic LHC but patient declined. Has not previously been on neurohormonal therapy. She reports ongoing diabetic foot ulcers bilaterally, worsening recently i/s/o progressive LE edema. Admitted and has been undergoing diuresis; now diuresing well on current regimen. Still with significant volume overload; RHC with preserved output. LHC shows diffusely diseased vessels, unclear if amenable to surgical or percutaneous revascularization but awaiting specialty input.    RHC: RA 20, PA 49/29 (40), PCWP 35, mVO2 51.1, CO/CI 3.8/2.2, SVR 1095  LHC: RCA , severe ostial LM disease, diffuse disease in LAD and LCx, some L to R collaterals    Recommendations:  - MCS/inotropy: ok to stop mil given preserved CI and stable end-organ perfusion markers  - GDMT: Ald 25 daily  - Diuretics: c/w Bumex @3, metolazone 10 daily, 150c 3%NS q8h, goal UOP 4-5L daily  - complete IV iron repletion  - HF workup: CTS eval, consider CMR viability  - CAD: ASA, high-intensity statin    Will follow-up on Tuesday, but please do not hesitate to reach out over the weekend with questions/concerns.
64F PMHx HFrEF and polyvascular dz including severe bilateral PAD and CKD who is presenting with acute on chronic decompensated systolic HF. Has reduced LV systolic function, previously recommended diagnostic LHC but patient declined. Had NM stress test with small area of anterior ischemia. I suspect etiology is more likely ischemic CM, will eventually need more definitive testing. Has not previously been on neurohoromonal therapy. She reports ongoing diabetic foot ulcers bilaterally, worsening recently i/s/o progressive LE edema. Admitted and has been undergoing diuresis; now diuresing well on current regimen. Still with significant volume overload.    Recommendations:  - MCS/inotropy: none  - GDMT: Ald 25 daily  - Diuretics: c/w Bumex @3, add metolazone 10 daily, 150c 3%NS BID, goal UOP 4-5L daily  - AG 18, please check LFTs and lactate  - IV iron repletion  - HF workup: R/LHC once euvolemic  - CAD: ASA, high-intensity statin
Ms. Kirby is a very pleasant 64  female with history of HFrEF (previously 30-35%, now 20%), never had LHC, DM, diabetic foot ulcer, severe PAD b/l, presenting initially for vascular angiogram, found to be in ADHF with volume overload. LHC showed RCA , severe ostial LM disease, diffuse disease in LAD and LCx, some L to R collaterals and RHC showed RA 20, PA 49/29 (40), PCWP 35, mVO2 51.1, CO/CI 3.8/2.2, SVR 1095. She was started on milrinone and diuretic drip for HF exacerbation and HF consulted for further management.     Assessment:  Acute on chronic HF exacerbation  Ischemic cardiomyopathy with severe LV dysfunction  Severe multivessel CAD (without revascularization)  Peripheral artery disease with ?arterial ulcers  Acute on chronic kidney disease  Diabetes mellitus 2 - poorly controlled (HbA1c 11.6 in 1/2025)  Hypertension history      Plan:   Continue milrinone drip.   Unable to optimize GDMT in the setting of renal dysfunction and low output state.  Hold diuretics, unable to assess volume status. JAMEL likely from volume depletion.   Check TTE today to assess IVC. Check CMP and lactate.  Aspirin for CAD.   Angiogram for PAD pending.  cMRI for viability pending. CAD revascularization based on cMRI,  Antibiotics per primary and ID for foot ulcers.
Patient found in bed in NAD. She remains on very high dose of diuretics for refractory fluid overload. Her kidney function has remained stable.     Continue Bumex gtt + hypertonic saline   DC metolazone tomorrow   Strict I/Os  Consider iv iron sucrose 200 mg daily x5  PT follow up
64-year-old female past medical history HFrEF, hypertension, hyperlipidemia, uncontrolled diabetes who did not follow-up with medical care presenting with ADHF and critical limb ischemia.  Course has been complicated by cardiogenic shock requiring milrinone and progression to ESRD on HD    Coronary angiogram reviewed with small and severe multivessel disease.  He underwent RLE angiogram with angioplasty of popliteal and AT.  Echo reviewed with severely reduced LV systolic function.    Discussed with the family and patient that her prognosis is extremely poor given her multiple significant comorbidities.  I discussed that her current status she likely has a 70% mortality rate within the next year.  I do not think she has a revascularization option for her coronary disease and in addition her cardiac MRI demonstrated mostly nonviable tissue in the LAD territory.  Will definitively touch base with CTS and interventional regarding options.  Conservative/medical therapy is also limited due to her kidney function however there does appear to be some hope for renal recovery.  My biggest concern is that she is still on IV milrinone and I am concerned that this could be a barrier to discharge depending on her renal function.  I discussed the possibility of palliative care however family is not entirely interested.    On exam she is lukewarm.  She is still moderately hypervolemic with JVP ~12 cm and peripheral edema.  She is still receiving HD but does make urine.  Will touch base with nephrology whether we can try diuretic challenge.  GDMT is limited by hypotension and ESRD.  She is on a small dose afterload reduction recommend increasing hydralazine to 25 mg 3 times daily and continue Isordil 5 mg 3 times daily.  Depending on next 24 to 48 hours may trial weaning milrinone again.    Face-to-face encounter, review of extensive medical records in this and prior charts, laboratory findings, radiographic and imaging/diagnostic results; documentation as noted above and discussion of diagnostic impressions and plan with the patient and team.    Titration of vasoactive heart failure medications. High risk of decompensation
Ms. Kirby is a very pleasant 64  female with history of HFrEF (previously 30-35%, now 20%), never had LHC, DM, diabetic foot ulcer, severe PAD b/l, presenting initially for vascular angiogram, found to be in ADHF with volume overload. LHC showed RCA , severe ostial LM disease, diffuse disease in LAD and LCx, some L to R collaterals and RHC showed RA 20, PA 49/29 (40), PCWP 35, mVO2 51.1, CO/CI 3.8/2.2, SVR 1095. She was started on milrinone and diuretic drip for HF exacerbation and HF consulted for further management.     Assessment:  Acute on chronic HF exacerbation  Ischemic cardiomyopathy with severe LV dysfunction  Severe multivessel CAD (without revascularization)  Peripheral artery disease with ?arterial ulcers  Acute on chronic kidney disease  Diabetes mellitus 2 - poorly controlled (HbA1c 11.6 in 1/2025)  Hypertension history      Plan:   Continue milrinone drip.   Unable to optimize GDMT in the setting of renal dysfunction and low output state.  Hold diuretics, unable to assess volume status. JAMEL likely from volume depletion.   Aspirin for CAD.   Angiogram for PAD pending.  cMRI for viability pending.  Antibiotics per primary and ID for foot ulcers.
Ms. Kirby is a very pleasant 64  female with history of HFrEF (previously 30-35%, now 20%), never had LHC, DM, diabetic foot ulcer, severe PAD b/l, presenting initially for vascular angiogram, found to be in ADHF with volume overload. LHC showed RCA , severe ostial LM disease, diffuse disease in LAD and LCx, some L to R collaterals and RHC showed RA 20, PA 49/29 (40), PCWP 35, mVO2 51.1, CO/CI 3.8/2.2, SVR 1095. She was started on milrinone and diuretic drip for HF exacerbation and HF consulted for further management.     Assessment:  Acute on chronic HF exacerbation  Ischemic cardiomyopathy with severe LV dysfunction  Severe multivessel CAD (without revascularization)  Peripheral artery disease with ?arterial ulcers  Acute on chronic kidney disease  Diabetes mellitus 2 - poorly controlled (HbA1c 11.6 in 1/2025)  Hypertension history      Plan:   Increase milrinone from 0.125 to 0.25 mcg/kg/min to improve renal perfusion.  Trial of IV fluids - 250 cc of IV fluid at 50 cc/hr for 5 hours.   Hold diuretics, unable to assess volume status. JAMEL likely from volume depletion.   Unable to optimize GDMT in the setting of renal dysfunction and low output state.  Aspirin for CAD.   Angiogram for PAD pending.  cMRI from yesterday shows greater than 75% subendocardial scarring involving the basal to mid inferior wall of the left ventricle, transmural scarring involving the apical septal wall and likely near transmural scarring of the apical lateral wall. There is about 50% scarring of the left ventricular basal inferoseptal wall.   Coronary angiogram shows diffuse disease.   Antibiotics per primary and ID for foot ulcers.
Ms. Kirby is a very pleasant 64  female with history of HFrEF (previously 30-35%, now 20%), never had LHC, DM, diabetic foot ulcer, severe PAD b/l, presenting initially for vascular angiogram, found to be in ADHF with volume overload. LHC showed RCA , severe ostial LM disease, diffuse disease in LAD and LCx, some L to R collaterals and RHC showed RA 20, PA 49/29 (40), PCWP 35, mVO2 51.1, CO/CI 3.8/2.2, SVR 1095. She was started on milrinone and diuretic drip for HF exacerbation and HF consulted for further management.     Assessment:  Acute on chronic HF exacerbation  Ischemic cardiomyopathy with severe LV dysfunction  Severe multivessel CAD (without revascularization)  Peripheral artery disease with ?arterial ulcers  Acute on chronic kidney disease  Diabetes mellitus 2 - poorly controlled (HbA1c 11.6 in 1/2025)  Hypertension history      Plan:   Continue milrinone 0.25 mcg/kg/min.  Had one session of HD yesterday and 500 cc fluid removed.  Intermittent diuretics as needed for volume removal as she continues to make urine.   Can use POCUS to guide assessing volume status.   Unable to optimize GDMT in the setting of renal dysfunction and low output state.  Aspirin for CAD.   Angiogram for PAD pending.  cMRI from yesterday shows greater than 75% subendocardial scarring involving the basal to mid inferior wall of the left ventricle, transmural scarring involving the apical septal wall and likely near transmural scarring of the apical lateral wall. There is about 50% scarring of the left ventricular basal inferoseptal wall.   Coronary angiogram shows diffuse disease.   Antibiotics per primary and ID for foot ulcers.
64-year-old female past medical history HFrEF, hypertension, hyperlipidemia, uncontrolled diabetes who did not follow-up with medical care presenting with ADHF and critical limb ischemia.  Course has been complicated by cardiogenic shock requiring milrinone and progression to ESRD on HD    Coronary angiogram reviewed with small and severe multivessel disease.  He underwent RLE angiogram with angioplasty of popliteal and AT.  Echo reviewed with severely reduced LV systolic function.    Discussed with the family and patient that her prognosis is extremely poor given her multiple significant comorbidities.  I discussed that her current status she likely has a 70% mortality rate within the next year.  I do not think she has a revascularization option for her coronary disease and in addition her cardiac MRI demonstrated mostly nonviable tissue in the LAD territory. However, IC planning for high risk PCI at end of week. I am concerned that given her multiple comorbidities and especially her PAD that if she were to decompensate that there are limited options for temporary MCS.    On exam she is lukewarm.  She is still moderately hypervolemic with JVP ~12 cm and peripheral edema. She responded minimally to IV bumex 4 mg and so likely at this time is HD dependent. Will touch base with nephrology but would advocate for continued dialysis sessions leading up to PCI. She is on a small dose afterload reduction recommend continuing hydralazine to 25 mg 3 times daily and increasing Isordil 10 mg 3 times daily.  Will continue milrinone as is and will readdress weaning after PCI.    Jordon Fabian MD  Interventional Cardiology/Advanced Heart Failure Transplant
64F HFrEF (previously 30-35%, now 20%), DM, diabetic foot ulcer, severe PAD b/l, presenting initially for vascular angiogram, found to be in ADHF with volume overload. Trialed on HD. Getting IV Bumex qd.    [facility] 2/13: RA 20, PA 49/29 (40), PCWP 35, mVO2 51.1, CO/CI 3.8/2.2, SVR 1095    Now s/p PCI/stent of LM and LAD and POBA of Cx.    -Decrease milrinone 0.125.  -Start hydralazine 10 mg po tid.  -Continue IV Bumex.  -Clarify long-term HD plan with Renal.
64F HFrEF (previously 30-35%, now 20%), DM, diabetic foot ulcer, severe PAD b/l, presenting initially for vascular angiogram, found to be in ADHF with volume overload. Cardiac output is preserved on RHC, but elevated filling pressures. After diuresis, now with rising creatinine. Trialed on HD. Getting IV Bumex qd.    Remains volume OL.    RHC: RA 20, PA 49/29 (40), PCWP 35, mVO2 51.1, CO/CI 3.8/2.2, SVR 1095   LHC: RCA , severe ostial LM disease, diffuse disease in LAD and LCx, some L to R collaterals     Cardiac testing:   cMRI 2/19/25 : LVEF is 25%, greater than 75% subendocardial scarring involving the basal to mid inferior wall of the left ventricle, left ventricular transmural scarring involving the apical septal wall and likely near transmural scarring of the apical lateral wall. 50% scarring of the left ventricular basal inferoseptal wall.   TTE 2/10/25: EF 20%, reduced RVSF   TTE 1/16/25: EF 30-35%, grade 2 DD   NST 1/24/25: small moderate defect in apical wall that is predominantly fixed    -Continue milrinone 0.25 for now.  -Plan for high-risk PCI tomorrow (turned down for CABG). Will defer diuretic drip for now, though she is fluid OL.  -Clarify long-term HD plan with Renal.    D/w Adelfo Fair and Mando.

## 2025-03-17 NOTE — PROGRESS NOTE ADULT - ASSESSMENT
64y.o. Female with PAD, CLTI affecting b/l LE, CHF, chronic b/l LE wound R worsen then the L, with R foot blistering and ulceration was transfer to [facility] for CO2 angiogram. Patient is currently followed by medicine and cardiology now s/p RLE angiogram with pop and AT angioplasty on 2/24/25.     PLAN:  - Plan for AKA today; POC after  - meds/cards cleared  - Patient should NOT go to HD before OR, can go after OR; fu nephro recs   - Plavix and aspirin  - rest of care per primary    Vascular Surgery   [id number]

## 2025-03-17 NOTE — PROGRESS NOTE ADULT - ASSESSMENT
64y Female with history of CHF presents as a transfer for LE CO2 angiogram. Nephrology consulted for elevated Scr.    1) JAMEL: likely due to ATN and CRS for which patient initiated on RRT on 2/20. JAMEL exacerbated by contrast nephropathy with last HD on 3/14. Patient now with evidence of renal recovery given improving pre-HD Scr. No need for additional HD at this time. S/P TDC placement on 3/11. Will remove likely this week if renal function remains stable post-op. S/P stiles for urinary retention. Avoid nephrotoxins.    2) HTN: BP low normal. Continue with current medications.    3) LE edema: Will likely resume oral diuretics for maintenance on 3/18. TTE with severely decreased LVSF. Monitor UO.     4) Anemia: Hb improving with low TSAT. No IV iron given elevated ferritin. S/P Epo 8K X 1 dose 3/12. Monitor Hb.    5) Hyponatremia: Improved. Continue with 1L FR once diet resumed.      [facility] NEPHROLOGY  Raji Waterman M.D.  Mars Feliz D.O.  Kelley Parks M.D.  MD Nancy Kulkarni, MSN, ANP-C    Telephone: [phone]  Facsimile: [phone] [place] [address], [address]  [place] [address]

## 2025-03-17 NOTE — PROGRESS NOTE ADULT - ATTENDING SUPERVISION STATEMENT
Resident
Fellow
Resident
Fellow

## 2025-03-18 LAB
ANION GAP SERPL CALC-SCNC: 11 MMOL/L — SIGNIFICANT CHANGE UP (ref 5–17)
BUN SERPL-MCNC: 30 MG/DL — HIGH (ref 7–23)
CALCIUM SERPL-MCNC: 8.5 MG/DL — SIGNIFICANT CHANGE UP (ref 8.4–10.5)
CHLORIDE SERPL-SCNC: 96 MMOL/L — SIGNIFICANT CHANGE UP (ref 96–108)
CO2 SERPL-SCNC: 24 MMOL/L — SIGNIFICANT CHANGE UP (ref 22–31)
CREAT SERPL-MCNC: 1.09 MG/DL — SIGNIFICANT CHANGE UP (ref 0.5–1.3)
EGFR: 57 ML/MIN/1.73M2 — LOW
EGFR: 57 ML/MIN/1.73M2 — LOW
GLUCOSE BLDC GLUCOMTR-MCNC: 220 MG/DL — HIGH (ref 70–99)
GLUCOSE BLDC GLUCOMTR-MCNC: 223 MG/DL — HIGH (ref 70–99)
GLUCOSE BLDC GLUCOMTR-MCNC: 252 MG/DL — HIGH (ref 70–99)
GLUCOSE BLDC GLUCOMTR-MCNC: 293 MG/DL — HIGH (ref 70–99)
GLUCOSE SERPL-MCNC: 267 MG/DL — HIGH (ref 70–99)
HCT VFR BLD CALC: 29.5 % — LOW (ref 34.5–45)
HGB BLD-MCNC: 10 G/DL — LOW (ref 11.5–15.5)
MAGNESIUM SERPL-MCNC: 2.1 MG/DL — SIGNIFICANT CHANGE UP (ref 1.6–2.6)
MCHC RBC-ENTMCNC: 25.6 PG — LOW (ref 27–34)
MCHC RBC-ENTMCNC: 33.9 G/DL — SIGNIFICANT CHANGE UP (ref 32–36)
MCV RBC AUTO: 75.4 FL — LOW (ref 80–100)
NRBC BLD AUTO-RTO: 0 /100 WBCS — SIGNIFICANT CHANGE UP (ref 0–0)
PHOSPHATE SERPL-MCNC: 2.9 MG/DL — SIGNIFICANT CHANGE UP (ref 2.5–4.5)
PLATELET # BLD AUTO: 145 K/UL — LOW (ref 150–400)
POTASSIUM SERPL-MCNC: 4.7 MMOL/L — SIGNIFICANT CHANGE UP (ref 3.5–5.3)
POTASSIUM SERPL-SCNC: 4.7 MMOL/L — SIGNIFICANT CHANGE UP (ref 3.5–5.3)
RBC # BLD: 3.91 M/UL — SIGNIFICANT CHANGE UP (ref 3.8–5.2)
RBC # FLD: 22.4 % — HIGH (ref 10.3–14.5)
SODIUM SERPL-SCNC: 131 MMOL/L — LOW (ref 135–145)
WBC # BLD: 11.82 K/UL — HIGH (ref 3.8–10.5)
WBC # FLD AUTO: 11.82 K/UL — HIGH (ref 3.8–10.5)

## 2025-03-18 PROCEDURE — 99232 SBSQ HOSP IP/OBS MODERATE 35: CPT

## 2025-03-18 PROCEDURE — 99222 1ST HOSP IP/OBS MODERATE 55: CPT

## 2025-03-18 RX ORDER — HYDROMORPHONE/SOD CHLOR,ISO/PF 2 MG/10 ML
1 SYRINGE (ML) INJECTION ONCE
Refills: 0 | Status: DISCONTINUED | OUTPATIENT
Start: 2025-03-18 | End: 2025-03-18

## 2025-03-18 RX ORDER — OXYCODONE HYDROCHLORIDE 30 MG/1
10 TABLET ORAL EVERY 4 HOURS
Refills: 0 | Status: DISCONTINUED | OUTPATIENT
Start: 2025-03-18 | End: 2025-03-19

## 2025-03-18 RX ORDER — METHOCARBAMOL 500 MG/1
750 TABLET, FILM COATED ORAL EVERY 6 HOURS
Refills: 0 | Status: DISCONTINUED | OUTPATIENT
Start: 2025-03-18 | End: 2025-03-27

## 2025-03-18 RX ORDER — INSULIN GLARGINE-YFGN 100 [IU]/ML
17 INJECTION, SOLUTION SUBCUTANEOUS AT BEDTIME
Refills: 0 | Status: DISCONTINUED | OUTPATIENT
Start: 2025-03-18 | End: 2025-03-19

## 2025-03-18 RX ORDER — ACETAMINOPHEN 500 MG/5ML
975 LIQUID (ML) ORAL EVERY 8 HOURS
Refills: 0 | Status: DISCONTINUED | OUTPATIENT
Start: 2025-03-18 | End: 2025-03-27

## 2025-03-18 RX ORDER — GABAPENTIN 400 MG/1
300 CAPSULE ORAL EVERY 8 HOURS
Refills: 0 | Status: DISCONTINUED | OUTPATIENT
Start: 2025-03-18 | End: 2025-03-27

## 2025-03-18 RX ORDER — OXYCODONE HYDROCHLORIDE 30 MG/1
5 TABLET ORAL EVERY 4 HOURS
Refills: 0 | Status: DISCONTINUED | OUTPATIENT
Start: 2025-03-18 | End: 2025-03-19

## 2025-03-18 RX ORDER — FUROSEMIDE 10 MG/ML
40 INJECTION INTRAMUSCULAR; INTRAVENOUS DAILY
Refills: 0 | Status: DISCONTINUED | OUTPATIENT
Start: 2025-03-18 | End: 2025-03-24

## 2025-03-18 RX ORDER — HYDROMORPHONE/SOD CHLOR,ISO/PF 2 MG/10 ML
0.2 SYRINGE (ML) INJECTION EVERY 6 HOURS
Refills: 0 | Status: DISCONTINUED | OUTPATIENT
Start: 2025-03-18 | End: 2025-03-19

## 2025-03-18 RX ORDER — INSULIN LISPRO 100 U/ML
8 INJECTION, SOLUTION INTRAVENOUS; SUBCUTANEOUS
Refills: 0 | Status: DISCONTINUED | OUTPATIENT
Start: 2025-03-18 | End: 2025-03-19

## 2025-03-18 RX ADMIN — Medication 1 TABLET(S): at 11:48

## 2025-03-18 RX ADMIN — Medication 3 MILLIGRAM(S): at 21:40

## 2025-03-18 RX ADMIN — GABAPENTIN 300 MILLIGRAM(S): 400 CAPSULE ORAL at 13:25

## 2025-03-18 RX ADMIN — Medication 40 MILLIGRAM(S): at 11:49

## 2025-03-18 RX ADMIN — Medication 975 MILLIGRAM(S): at 21:38

## 2025-03-18 RX ADMIN — ATORVASTATIN CALCIUM 40 MILLIGRAM(S): 80 TABLET, FILM COATED ORAL at 21:38

## 2025-03-18 RX ADMIN — INSULIN LISPRO 2: 100 INJECTION, SOLUTION INTRAVENOUS; SUBCUTANEOUS at 12:19

## 2025-03-18 RX ADMIN — Medication 1 MILLIGRAM(S): at 03:48

## 2025-03-18 RX ADMIN — OXYCODONE HYDROCHLORIDE 10 MILLIGRAM(S): 30 TABLET ORAL at 16:00

## 2025-03-18 RX ADMIN — Medication 1 MILLIGRAM(S): at 00:25

## 2025-03-18 RX ADMIN — GABAPENTIN 300 MILLIGRAM(S): 400 CAPSULE ORAL at 21:39

## 2025-03-18 RX ADMIN — Medication 975 MILLIGRAM(S): at 22:33

## 2025-03-18 RX ADMIN — INSULIN LISPRO 6 UNIT(S): 100 INJECTION, SOLUTION INTRAVENOUS; SUBCUTANEOUS at 12:20

## 2025-03-18 RX ADMIN — Medication 1 MILLIGRAM(S): at 02:48

## 2025-03-18 RX ADMIN — METHOCARBAMOL 750 MILLIGRAM(S): 500 TABLET, FILM COATED ORAL at 13:24

## 2025-03-18 RX ADMIN — FUROSEMIDE 40 MILLIGRAM(S): 10 INJECTION INTRAMUSCULAR; INTRAVENOUS at 12:35

## 2025-03-18 RX ADMIN — INSULIN LISPRO 3: 100 INJECTION, SOLUTION INTRAVENOUS; SUBCUTANEOUS at 17:00

## 2025-03-18 RX ADMIN — INSULIN LISPRO 3: 100 INJECTION, SOLUTION INTRAVENOUS; SUBCUTANEOUS at 08:40

## 2025-03-18 RX ADMIN — Medication 0.25 MILLIGRAM(S): at 21:40

## 2025-03-18 RX ADMIN — INSULIN LISPRO 8 UNIT(S): 100 INJECTION, SOLUTION INTRAVENOUS; SUBCUTANEOUS at 17:01

## 2025-03-18 RX ADMIN — CLOPIDOGREL BISULFATE 75 MILLIGRAM(S): 75 TABLET, FILM COATED ORAL at 11:48

## 2025-03-18 RX ADMIN — Medication 1 MILLIGRAM(S): at 19:20

## 2025-03-18 RX ADMIN — INSULIN LISPRO 6 UNIT(S): 100 INJECTION, SOLUTION INTRAVENOUS; SUBCUTANEOUS at 08:40

## 2025-03-18 RX ADMIN — Medication 2 MILLIGRAM(S): at 09:15

## 2025-03-18 RX ADMIN — Medication 975 MILLIGRAM(S): at 13:24

## 2025-03-18 RX ADMIN — OXYCODONE HYDROCHLORIDE 10 MILLIGRAM(S): 30 TABLET ORAL at 14:59

## 2025-03-18 RX ADMIN — Medication 81 MILLIGRAM(S): at 11:48

## 2025-03-18 RX ADMIN — Medication 2 MILLIGRAM(S): at 08:46

## 2025-03-18 RX ADMIN — Medication 975 MILLIGRAM(S): at 14:10

## 2025-03-18 RX ADMIN — INSULIN GLARGINE-YFGN 17 UNIT(S): 100 INJECTION, SOLUTION SUBCUTANEOUS at 21:39

## 2025-03-18 RX ADMIN — METHOCARBAMOL 750 MILLIGRAM(S): 500 TABLET, FILM COATED ORAL at 17:02

## 2025-03-18 NOTE — PHYSICAL THERAPY INITIAL EVALUATION ADULT - ADDITIONAL COMMENTS
pt lives with brother in apartment, (+)elevator. pt independent with mobility and did not use assistive device
Pt lives in an apartment with her brother; there is elevator access. Works as a respiratory therapist at [employer].

## 2025-03-18 NOTE — PHYSICAL THERAPY INITIAL EVALUATION ADULT - CRITERIA FOR SKILLED THERAPEUTIC INTERVENTIONS
impairments found/risk reduction/prevention/rehab potential/therapy frequency/predicted duration of therapy intervention/anticipated equipment needs at discharge
impairments found

## 2025-03-18 NOTE — PHYSICAL THERAPY INITIAL EVALUATION ADULT - TRANSFER TRAINING, PT EVAL
GOAL: Pt will perform squat pivot or slide board transfer bed <> chair with minimal assistance in 3 weeks
GOAL: pt will transfer sit-stand independently by 2 weeks

## 2025-03-18 NOTE — CONSULT NOTE ADULT - SUBJECTIVE AND OBJECTIVE BOX
Chief Complaint:  Patient is a 64y old  Female who presents with a chief complaint of Transfer for CO2 angiogram; PAD and CLTI (18 Mar 2025 15:25)    HPI:  64F, hx of CHF (last TTE on 1/16/25 EF 30-35%, G2DD), DM, diabetic foot ulcer with a recent admission at [facility] for CHF exacerbation presented as a transfer for worsening R. leg pain and fluid secretion, On non-invasive imaging demonstrating severe depletion of RLE blood flow beyond the popliteal vessel at knee and similar findings in L. Was transferred to [facility] for CO2 angiogram with Dr. Baltazar. (29 Jan 2025 20:05)    Pt admitted 48 days ago for right lower limb ischemia, pain and ulcers.   - RLE Arterial Duplex with popliteal artery occlusion   - s/p angio with pop and AT VERA on 2/24   - c/b necrosis of RLE   - 3/17 s/p right AKA  Hospital course includes Bradycardia, JAMEL, hyponatremia, Hemodialysis    Pt seen lying in bed, tearful, c/o severe RLE sharp, stabbing pains and spasms. Denies phantom limb pain.     Current out- patient pain regimen: None    Out Patient Pain Management provider: None    [facility] Prescription Monitoring Program: Reference #[id number]    Opioid Risk Tool (ORT-OUD) Score: Low    Pain Score: 10/10 to 0/10      REVIEW OF SYSTEMS:  CONSTITUTIONAL: No fever, weight loss, fatigue, falls  NEURO: No headaches, memory loss, loss of strength, tremors, dizziness or blurred vision  GI: No abdominal pain, nausea, vomiting, diarrhea, constipation, incontinence  : No urinary incontinence/retention  SKIN: No itching, burning, rashes, or lesions       PHYSICAL EXAM  GENERAL: Seen at bedside, NAD, well-groomed, well-developed, appears stated age, no signs of toxicity  NEURO: Alert & Oriented X3, Good concentration; Follows commands   HEENT: Head atraumatic, normocephalic; speech clear and fluent  GI: Appetite good, (+)BM  : Voiding  EXTREMITIES:  moving all extremities  MSK: Motor Strength 5/5 B/L upper and lower extremities; ROM intact; Straight Leg Raise negative; no paravertebral tenderness; no tenderness on spinal palpation; no muscle spasm; ambulates independently w/ walker w/rollator with cane  SKIN: No rashes or lesions  PSYCH: affect normal; good eye contact; no signs of depression or anxiety    PAST MEDICAL & SURGICAL HISTORY:  DM (diabetes mellitus)      Diabetic foot ulcer      Congestive heart failure (CHF)      CAD (coronary artery disease)      Cataract of both eyes, unspecified cataract type      History of cholecystectomy          FAMILY HISTORY:  Family history of CHF (congestive heart failure) (Mother)        Allergies    No Known Allergies    Intolerances    Augmentin (Stomach Upset; Vomiting; Nausea)      MEDICATIONS  (STANDING):  acetaminophen     Tablet .. 975 milliGRAM(s) Oral every 8 hours  aspirin enteric coated 81 milliGRAM(s) Oral daily  atorvastatin 40 milliGRAM(s) Oral at bedtime  bisacodyl 5 milliGRAM(s) Oral every 12 hours  chlorhexidine 4% Liquid 1 Application(s) Topical <User Schedule>  clopidogrel Tablet 75 milliGRAM(s) Oral daily  dextrose 5%. 1000 milliLiter(s) (100 mL/Hr) IV Continuous <Continuous>  dextrose 5%. 1000 milliLiter(s) (50 mL/Hr) IV Continuous <Continuous>  dextrose 50% Injectable 25 Gram(s) IV Push once  dextrose 50% Injectable 12.5 Gram(s) IV Push once  dextrose 50% Injectable 25 Gram(s) IV Push once  furosemide    Tablet 40 milliGRAM(s) Oral daily  gabapentin 300 milliGRAM(s) Oral every 8 hours  glucagon  Injectable 1 milliGRAM(s) IntraMuscular once  insulin glargine Injectable (LANTUS) 17 Unit(s) SubCutaneous at bedtime  insulin lispro (ADMELOG) corrective regimen sliding scale   SubCutaneous three times a day before meals  insulin lispro (ADMELOG) corrective regimen sliding scale   SubCutaneous at bedtime  insulin lispro Injectable (ADMELOG) 8 Unit(s) SubCutaneous three times a day with meals  methocarbamol 750 milliGRAM(s) Oral every 6 hours  metoprolol succinate ER 25 milliGRAM(s) Oral daily  Nephro-rober 1 Tablet(s) Oral daily  pantoprazole  Injectable 40 milliGRAM(s) IV Push daily  polyethylene glycol 3350 17 Gram(s) Oral daily  senna 2 Tablet(s) Oral at bedtime    MEDICATIONS  (PRN):  ALPRAZolam 0.25 milliGRAM(s) Oral daily PRN anxiety  dextrose Oral Gel 15 Gram(s) Oral once PRN Blood Glucose LESS THAN 70 milliGRAM(s)/deciliter  HYDROmorphone  Injectable 0.2 milliGRAM(s) IV Push every 6 hours PRN severe breakthrough pain  melatonin 3 milliGRAM(s) Oral at bedtime PRN Insomnia  ondansetron Injectable 4 milliGRAM(s) IV Push every 6 hours PRN Nausea and/or Vomiting  oxyCODONE    IR 5 milliGRAM(s) Oral every 4 hours PRN Moderate Pain (4 - 6)  oxyCODONE    IR 10 milliGRAM(s) Oral every 4 hours PRN Severe Pain (7 - 10)  sodium chloride 0.65% Nasal 1 Spray(s) Both Nostrils three times a day PRN Dryness  sodium chloride 0.9% lock flush 10 milliLiter(s) IV Push every 1 hour PRN Pre/post blood products, medications, blood draw, and to maintain line patency      Vital Signs:  T(C): 36.6 (03-18-25 @ 11:34)  HR: 90 (03-18-25 @ 11:34)  BP: 102/66 (03-18-25 @ 11:34)  RR: 18 (03-18-25 @ 11:34)  SpO2: 96% (03-18-25 @ 11:34)    Pertinent labs/radiology:  Reviewed                          10.0   11.82 )-----------( 145      ( 18 Mar 2025 06:30 )             29.5       03-18    131[L]  |  96  |  30[H]  ----------------------------<  267[H]  4.7   |  24  |  1.09    Ca    8.5      18 Mar 2025 06:29  Phos  2.9     03-18  Mg     2.1     03-18   Chief Complaint:  Patient is a 64y old  Female who presents with a chief complaint of Transfer for CO2 angiogram; PAD and CLTI (18 Mar 2025 15:25)    HPI:  64F, hx of CHF (last TTE on 1/16/25 EF 30-35%, G2DD), DM, diabetic foot ulcer with a recent admission at [facility] for CHF exacerbation presented as a transfer for worsening R. leg pain and fluid secretion, On non-invasive imaging demonstrating severe depletion of RLE blood flow beyond the popliteal vessel at knee and similar findings in L. Was transferred to [facility] for CO2 angiogram with Dr. Baltazar. (29 Jan 2025 20:05)    Pt admitted 48 days ago for right lower limb ischemia, pain and ulcers.   - RLE Arterial Duplex with popliteal artery occlusion   - s/p angio with pop and AT VERA on 2/24   - c/b necrosis of RLE   - 3/17 s/p right AKA  Hospital course includes Bradycardia, JAMEL, hyponatremia, Hemodialysis    Current out- patient pain regimen: None    Out Patient Pain Management provider: None    [facility] Prescription Monitoring Program: Reference #[id number]    Opioid Risk Tool (ORT-OUD) Score: Low    Pain Score: 10/10 to 0/10    Pt seen lying in bed, tearful, c/o severe RLE sharp, stabbing pains and spasms. Denies phantom limb pain.       REVIEW OF SYSTEMS:  CONSTITUTIONAL: No fever, weight loss, fatigue, falls  NEURO: No headaches, memory loss, loss of strength, tremors, dizziness or blurred vision  GI: No abdominal pain, nausea, vomiting, diarrhea, constipation, incontinence  : No urinary incontinence/retention  SKIN: No itching, burning, rashes, or lesions       PHYSICAL EXAM  GENERAL: Seen at bedside, NAD, well-groomed, well-developed, appears stated age, no signs of toxicity  NEURO: Alert & Oriented X3, Good concentration; Follows commands   HEENT: Head atraumatic, normocephalic; speech clear and fluent  GI: Appetite good, (+)BM  : Voiding  EXTREMITIES:  moving all extremities  MSK: Motor Strength 5/5 B/L upper and lower extremities; ROM intact; Straight Leg Raise negative; no paravertebral tenderness; no tenderness on spinal palpation; no muscle spasm; ambulates independently w/ walker w/rollator with cane  SKIN: No rashes or lesions  PSYCH: affect normal; good eye contact; no signs of depression or anxiety    PAST MEDICAL & SURGICAL HISTORY:  DM (diabetes mellitus)      Diabetic foot ulcer      Congestive heart failure (CHF)      CAD (coronary artery disease)      Cataract of both eyes, unspecified cataract type      History of cholecystectomy          FAMILY HISTORY:  Family history of CHF (congestive heart failure) (Mother)        Allergies    No Known Allergies    Intolerances    Augmentin (Stomach Upset; Vomiting; Nausea)      MEDICATIONS  (STANDING):  acetaminophen     Tablet .. 975 milliGRAM(s) Oral every 8 hours  aspirin enteric coated 81 milliGRAM(s) Oral daily  atorvastatin 40 milliGRAM(s) Oral at bedtime  bisacodyl 5 milliGRAM(s) Oral every 12 hours  chlorhexidine 4% Liquid 1 Application(s) Topical <User Schedule>  clopidogrel Tablet 75 milliGRAM(s) Oral daily  dextrose 5%. 1000 milliLiter(s) (100 mL/Hr) IV Continuous <Continuous>  dextrose 5%. 1000 milliLiter(s) (50 mL/Hr) IV Continuous <Continuous>  dextrose 50% Injectable 25 Gram(s) IV Push once  dextrose 50% Injectable 12.5 Gram(s) IV Push once  dextrose 50% Injectable 25 Gram(s) IV Push once  furosemide    Tablet 40 milliGRAM(s) Oral daily  gabapentin 300 milliGRAM(s) Oral every 8 hours  glucagon  Injectable 1 milliGRAM(s) IntraMuscular once  insulin glargine Injectable (LANTUS) 17 Unit(s) SubCutaneous at bedtime  insulin lispro (ADMELOG) corrective regimen sliding scale   SubCutaneous three times a day before meals  insulin lispro (ADMELOG) corrective regimen sliding scale   SubCutaneous at bedtime  insulin lispro Injectable (ADMELOG) 8 Unit(s) SubCutaneous three times a day with meals  methocarbamol 750 milliGRAM(s) Oral every 6 hours  metoprolol succinate ER 25 milliGRAM(s) Oral daily  Nephro-rober 1 Tablet(s) Oral daily  pantoprazole  Injectable 40 milliGRAM(s) IV Push daily  polyethylene glycol 3350 17 Gram(s) Oral daily  senna 2 Tablet(s) Oral at bedtime    MEDICATIONS  (PRN):  ALPRAZolam 0.25 milliGRAM(s) Oral daily PRN anxiety  dextrose Oral Gel 15 Gram(s) Oral once PRN Blood Glucose LESS THAN 70 milliGRAM(s)/deciliter  HYDROmorphone  Injectable 0.2 milliGRAM(s) IV Push every 6 hours PRN severe breakthrough pain  melatonin 3 milliGRAM(s) Oral at bedtime PRN Insomnia  ondansetron Injectable 4 milliGRAM(s) IV Push every 6 hours PRN Nausea and/or Vomiting  oxyCODONE    IR 5 milliGRAM(s) Oral every 4 hours PRN Moderate Pain (4 - 6)  oxyCODONE    IR 10 milliGRAM(s) Oral every 4 hours PRN Severe Pain (7 - 10)  sodium chloride 0.65% Nasal 1 Spray(s) Both Nostrils three times a day PRN Dryness  sodium chloride 0.9% lock flush 10 milliLiter(s) IV Push every 1 hour PRN Pre/post blood products, medications, blood draw, and to maintain line patency      Vital Signs:  T(C): 36.6 (03-18-25 @ 11:34)  HR: 90 (03-18-25 @ 11:34)  BP: 102/66 (03-18-25 @ 11:34)  RR: 18 (03-18-25 @ 11:34)  SpO2: 96% (03-18-25 @ 11:34)    Pertinent labs/radiology:  Reviewed                          10.0   11.82 )-----------( 145      ( 18 Mar 2025 06:30 )             29.5       03-18    131[L]  |  96  |  30[H]  ----------------------------<  267[H]  4.7   |  24  |  1.09    Ca    8.5      18 Mar 2025 06:29  Phos  2.9     03-18  Mg     2.1     03-18

## 2025-03-18 NOTE — ADVANCED PRACTICE NURSE CONSULT - ASSESSMENT
Patient was not seen. WOC at bedside at approximately 11am to introduce self and reason for visit. Patient states she is unable to turn due to high level of pain, requesting that WOC "come back later." At 1500, WOC calls RN Kelly on unit, patient relays to RN that she cannot turn asking for WOC to "come back tomorrow."   Patient was not seen. WOC at bedside at approximately 11am to introduce self and reason for visit. Patient states she is unable to turn due to high level of pain, requesting that WOC "come back later." At 1500, WOC calls RN Kelly on unit, patient relays to RN that she cannot turn asking for WOC to "come back tomorrow." RN will return when patient is available.

## 2025-03-18 NOTE — PHYSICAL THERAPY INITIAL EVALUATION ADULT - SITTING BALANCE: STATIC
fair balance
Able to maintain with no LE support and bilateral UE supported; no unsteadiness observed./fair balance

## 2025-03-18 NOTE — PROGRESS NOTE ADULT - ASSESSMENT
64y.o. Female with PAD, CLTI affecting b/l LE, CHF, chronic b/l LE wound R worsen then the L, with R foot blistering and ulceration was transfer to [facility] for CO2 angiogram. Patient is currently followed by medicine and cardiology now s/p RLE angiogram with pop and AT angioplasty on 2/24/25 now s/p R AKA 3/17.     PLAN:  - Monitor brachial A line site  - Dressing off POD #3 3/20  - can resume ASA/Plavix today   - Diet: Regular   - Analgesia and antiemetics as needed  - Strict I&O's   - Incentive spirometry    Vascular [id number]  Darrion Tipton MD (PGY2)

## 2025-03-18 NOTE — OCCUPATIONAL THERAPY INITIAL EVALUATION ADULT - ADL RETRAINING, OT EVAL
GOAL: Pt will perform LB dressing w/supervision within 4 weeks.
GOAL: Patient will perform LB dressing with contact guard assist by 4 weeks.

## 2025-03-18 NOTE — PHYSICAL THERAPY INITIAL EVALUATION ADULT - PLANNED THERAPY INTERVENTIONS, PT EVAL
bed mobility training/neuromuscular re-education/transfer training
bed mobility training/gait training/transfer training

## 2025-03-18 NOTE — PHYSICAL THERAPY INITIAL EVALUATION ADULT - PERTINENT HX OF CURRENT PROBLEM, REHAB EVAL
Pt is a 64 year old female with PMH of transferred to [facility] for CO2 angiogram. Pt is a 64 year old female with PMH of HFrEF, DM2, CAD, and diabetic foot ulcer transferred to [facility] for CO2 angiogram. Pt initially presented to ED for worsening right leg pain and fluid secretion. As per documentation, patient was reported to have severe depletion of RLE blood flow beyond the popliteal vessel at knee and similar findings in the left. Pt underwent RLE angiogram with pop and AT angioplasty 2/24 c/b necrosis of RLE now s/p right LE AKA. Hospital course further c/b JAMEL with hyponatremia and metabolic acidosis requiring HD s/p permacath 3/11 and ADHF with bilateral LE edema (negative DVT), pericardial effusion, and pleural effusion.

## 2025-03-18 NOTE — PHYSICAL THERAPY INITIAL EVALUATION ADULT - GROSSLY INTACT, SENSORY
Left LE distal to distal thigh absent; Right LE donned in ace wrap and unable to formally assess/Left UE/Right UE/Grossly Intact
min impaired to light touch left knee/Grossly Intact

## 2025-03-18 NOTE — OCCUPATIONAL THERAPY INITIAL EVALUATION ADULT - GENERAL OBSERVATIONS, REHAB EVAL
Upon entry, patient semi-supine in bed +s/p R AKA ace wrapping intact, +IVL +stiles +tele, patient agreeable to OT eval, cleared for OT evaluation as per RN.
Pt rec'd OOB to chair lines intact, NAD

## 2025-03-18 NOTE — PROGRESS NOTE ADULT - SUBJECTIVE AND OBJECTIVE BOX
[facility] NEPHROLOGY- PROGRESS NOTE    64y Female with history of CHF presents as a transfer for LE CO2 angiogram. Nephrology consulted for elevated Scr.    Patient s/p R ANILA on 3/17.     REVIEW OF SYSTEMS:  Gen: no fevers  Cards: no chest pain  Resp: no dyspnea  GI: no nausea or vomiting or diarrhea  Vascular: no LE edema    Augmentin (Stomach Upset; Vomiting; Nausea)  No Known Allergies      Hospital Medications: Medications reviewed        VITALS:  T(F): 97.8 (03-18-25 @ 11:34), Max: 98.4 (03-17-25 @ 17:43)  HR: 90 (03-18-25 @ 11:34)  BP: 102/66 (03-18-25 @ 11:34)  RR: 18 (03-18-25 @ 11:34)  SpO2: 96% (03-18-25 @ 11:34)  Wt(kg): --    03-17 @ 07:01  -  03-18 @ 07:00  --------------------------------------------------------  IN: 0 mL / OUT: 300 mL / NET: -300 mL        PHYSICAL EXAM:  Gen: NAD, calm  Cards: RRR, +S1/S2, no M/G/R  Resp: bibasilar rales  GI: soft, NT/ND, NABS  : + stiles  Vascular: R AKA,  + RIJ TDC intact        LABS:  03-18    131[L]  |  96  |  30[H]  ----------------------------<  267[H]  4.7   |  24  |  1.09    Ca    8.5      18 Mar 2025 06:29  Phos  2.9     03-18  Mg     2.1     03-18      Creatinine Trend: 1.09 <--, 1.47 <--, 1.80 <--, 2.99 <--, 3.61 <--                        10.0   11.82 )-----------( 145      ( 18 Mar 2025 06:30 )             29.5     Urine Studies:  Urinalysis Basic - ( 18 Mar 2025 06:29 )    Color:  / Appearance:  / SG:  / pH:   Gluc: 267 mg/dL / Ketone:   / Bili:  / Urobili:    Blood:  / Protein:  / Nitrite:    Leuk Esterase:  / RBC:  / WBC    Sq Epi:  / Non Sq Epi:  / Bacteria:

## 2025-03-18 NOTE — OCCUPATIONAL THERAPY INITIAL EVALUATION ADULT - PERTINENT HX OF CURRENT PROBLEM, REHAB EVAL
63 y/o F with PMH of CHF (last TTE 1/2025 with EF 30-35%), DM, diabetic foot ulcer, PAD, CAD. Recent admission at [facility] for CHF exacerbation, presented to [facility] as a transfer for worsening R leg pain. Hospital course c/b acute on chronic CHF - TTE with EF 20%, severely decreased LV and RV function, multiple regional motion abnormalities, s/p LHC revealing TVD, pleural/pericardial effusions, JAMEL, leukocytosis suspected to be in the setting of LE wound on IV ABX, persistent RLE pain w/ RLE Arterial Duplex revealing popliteal artery occlusion  Plan for eventual angiogram with vascular when medically optimized. Pulmonary called to consult as pt with c/o SOB.
64F, hx of CHF (last TTE on 1/16/25 EF 30-35%, G2DD), DM, diabetic foot ulcer with a recent admission at [facility] for CHF exacerbation presented as a transfer for worsening R. leg pain and fluid secretion, On non-invasive imaging demonstrating severe depletion of RLE blood flow beyond the popliteal vessel at knee and similar findings in L. Was transferred to [facility] for CO2 angiogram with Dr. Baltazar (-)DVT B LE, CT head: (-)acute hem, US kidney/bladder:No hydronephrosis.

## 2025-03-18 NOTE — OCCUPATIONAL THERAPY INITIAL EVALUATION ADULT - ADDITIONAL COMMENTS
Pt lives w/brother in elevator apt. PTA pt was indep in ADL/amb, owns RW but didn't utilize.
Pt lives w/brother in elevator apt. PTA pt was indep in ADL/amb, owns RW but didn't utilize.

## 2025-03-18 NOTE — PROGRESS NOTE ADULT - ASSESSMENT
65 y/o F with PMH of CHF (last TTE 1/2025 with EF 30-35%), DM, diabetic foot ulcer, PAD, CAD. Recent admission at [facility] for CHF exacerbation, presented to [facility] as a transfer for worsening R leg pain. Hospital course c/b acute on chronic CHF - TTE with EF 20%, severely decreased LV and RV function, multiple regional motion abnormalities, s/p LHC revealing TVD, pleural/pericardial effusions, JAMEL, leukocytosis suspected to be in the setting of LE wound on IV ABX, persistent RLE pain w/ RLE Arterial Duplex revealing popliteal artery occlusion  Plan for eventual angiogram with vascular when medically optimized. Pulmonary called to consult as pt with c/o SOB.

## 2025-03-18 NOTE — OCCUPATIONAL THERAPY INITIAL EVALUATION ADULT - BED MOBILITY TRAINING, PT EVAL
GOAL: Patient will perform supine to sit and sit to supine with contact guard assist  x1 by 4 weeks.
GOAL: Pt will perform bed mobility w/supervision within 4 weeks.

## 2025-03-18 NOTE — PROGRESS NOTE ADULT - SUBJECTIVE AND OBJECTIVE BOX
MR#[id number]  PATIENT NAME:COLE ALBA    DATE OF SERVICE: 03-18-25 @ 0930  Patient was seen and examined by Yordy Gilmore MD on    03-18-25 @ 0930  Interim events noted.Consultant notes ,Labs,Telemetry reviewed by me       HOSPITAL COURSE: HPI:  64F, hx of CHF (last TTE on 1/16/25 EF 30-35%, G2DD), DM, diabetic foot ulcer with a recent admission at [facility] for CHF exacerbation presented as a transfer for worsening R. leg pain and fluid secretion, On non-invasive imaging demonstrating severe depletion of RLE blood flow beyond the popliteal vessel at knee and similar findings in L. Was transferred to [facility] for CO2 angiogram with Dr. Baltazar. (29 Jan 2025 20:05)      INTERIM EVENTS:Patient seen at bedside ,interim events noted.      PMH -reviewed admission note, no change since admission  HEART FAILURE: Acute[ ]Chronic[ ] Systolic[ ] Diastolic[ ] Combined Systolic and Diastolic[ ]  CAD[ ] CABG[ ] PCI[ ]  DEVICES[ ] PPM[ ] ICD[ ] ILR[ ]  ATRIAL FIBRILLATION[ ] Paroxysmal[ ] Permanent[ ] CHADS2-[  ]  JAMEL[ ] CKD1[ ] CKD2[ ] CKD3[ ] CKD4[ ] ESRD[ ]  COPD[ ] HTN[ ]   DM[ ] Type1[ ] Type 2[ ]   CVA[ ] Paresis[ ]    AMBULATION: Assisted[ ] Cane/walker[ ] Independent[ ]    MEDICATIONS  (STANDING):  acetaminophen     Tablet .. 975 milliGRAM(s) Oral every 8 hours  aspirin enteric coated 81 milliGRAM(s) Oral daily  atorvastatin 40 milliGRAM(s) Oral at bedtime  bisacodyl 5 milliGRAM(s) Oral every 12 hours  chlorhexidine 4% Liquid 1 Application(s) Topical <User Schedule>  clopidogrel Tablet 75 milliGRAM(s) Oral daily  dextrose 5%. 1000 milliLiter(s) (100 mL/Hr) IV Continuous <Continuous>  dextrose 5%. 1000 milliLiter(s) (50 mL/Hr) IV Continuous <Continuous>  dextrose 50% Injectable 25 Gram(s) IV Push once  dextrose 50% Injectable 12.5 Gram(s) IV Push once  dextrose 50% Injectable 25 Gram(s) IV Push once  furosemide    Tablet 40 milliGRAM(s) Oral daily  gabapentin 300 milliGRAM(s) Oral every 8 hours  glucagon  Injectable 1 milliGRAM(s) IntraMuscular once  insulin glargine Injectable (LANTUS) 17 Unit(s) SubCutaneous at bedtime  insulin lispro (ADMELOG) corrective regimen sliding scale   SubCutaneous three times a day before meals  insulin lispro (ADMELOG) corrective regimen sliding scale   SubCutaneous at bedtime  insulin lispro Injectable (ADMELOG) 8 Unit(s) SubCutaneous three times a day with meals  methocarbamol 750 milliGRAM(s) Oral every 6 hours  metoprolol succinate ER 25 milliGRAM(s) Oral daily  Nephro-rober 1 Tablet(s) Oral daily  pantoprazole  Injectable 40 milliGRAM(s) IV Push daily  polyethylene glycol 3350 17 Gram(s) Oral daily  senna 2 Tablet(s) Oral at bedtime    MEDICATIONS  (PRN):  ALPRAZolam 0.25 milliGRAM(s) Oral daily PRN anxiety  dextrose Oral Gel 15 Gram(s) Oral once PRN Blood Glucose LESS THAN 70 milliGRAM(s)/deciliter  HYDROmorphone  Injectable 0.2 milliGRAM(s) IV Push every 6 hours PRN severe breakthrough pain  melatonin 3 milliGRAM(s) Oral at bedtime PRN Insomnia  ondansetron Injectable 4 milliGRAM(s) IV Push every 6 hours PRN Nausea and/or Vomiting  oxyCODONE    IR 5 milliGRAM(s) Oral every 4 hours PRN Moderate Pain (4 - 6)  oxyCODONE    IR 10 milliGRAM(s) Oral every 4 hours PRN Severe Pain (7 - 10)  sodium chloride 0.65% Nasal 1 Spray(s) Both Nostrils three times a day PRN Dryness  sodium chloride 0.9% lock flush 10 milliLiter(s) IV Push every 1 hour PRN Pre/post blood products, medications, blood draw, and to maintain line patency            REVIEW OF SYSTEMS:  Constitutional: [ ] fever, [ ]weight loss,  [ ]fatigue [ ]weight gain  Eyes: [ ] visual changes  Respiratory: [ ]shortness of breath;  [ ] cough, [ ]wheezing, [ ]chills, [ ]hemoptysis  Cardiovascular: [ ] chest pain, [ ]palpitations, [ ]dizziness,  [ ]leg swelling[ ]orthopnea[ ]PND  Gastrointestinal: [ ] abdominal pain, [ ]nausea, [ ]vomiting,  [ ]diarrhea [ ]Constipation [ ]Melena  Genitourinary: [ ] dysuria, [ ] hematuria [ ]Montgomery  Neurologic: [ ] headaches [ ] tremors[ ]weakness [ ]Paralysis Right[ ] Left[ ]  Skin: [ ] itching, [ ]burning, [ ] rashes  Endocrine: [ ] heat or cold intolerance  Musculoskeletal: [ ] joint pain or swelling; [ ] muscle, back, or extremity pain  Psychiatric: [ ] depression, [ ]anxiety, [ ]mood swings, or [ ]difficulty sleeping  Hematologic: [ ] easy bruising, [ ] bleeding gums    [ ] All remaining systems negative except as per above.   [ ]Unable to obtain.  [x] No change in ROS since admission      Vital Signs Last 24 Hrs  T(C): 36.5 (18 Mar 2025 20:47), Max: 36.9 (18 Mar 2025 00:03)  T(F): 97.7 (18 Mar 2025 20:47), Max: 98.4 (18 Mar 2025 00:03)  HR: 87 (18 Mar 2025 20:47) (87 - 95)  BP: 101/65 (18 Mar 2025 20:47) (100/57 - 104/62)  BP(mean): --  RR: 18 (18 Mar 2025 20:47) (18 - 18)  SpO2: 93% (18 Mar 2025 20:47) (91% - 96%)    Parameters below as of 18 Mar 2025 20:47  Patient On (Oxygen Delivery Method): room air      I&O's Summary    17 Mar 2025 07:01  -  18 Mar 2025 07:00  --------------------------------------------------------  IN: 0 mL / OUT: 300 mL / NET: -300 mL    18 Mar 2025 07:01  -  18 Mar 2025 22:18  --------------------------------------------------------  IN: 200 mL / OUT: 300 mL / NET: -100 mL        PHYSICAL EXAM:  General: No acute distress BMI-  HEENT: EOMI, PERRL  Neck: Supple, [ ] JVD  Lungs: Equal air entry bilaterally; [ ] rales [ ] wheezing [ ] rhonchi  Heart: Regular rate and rhythm; [x ] murmur   2/6 [ x] systolic [ ] diastolic [ ] radiation[ ] rubs [ ]  gallops  Abdomen: Nontender, bowel sounds present  Extremities: No clubbing, cyanosis, [ ] edema [ ]Pulses  equal and intact  Nervous system:  Alert & Oriented X3, no focal deficits  Psychiatric: Normal affect  Skin: No rashes or lesions    LABS:  03-18    131[L]  |  96  |  30[H]  ----------------------------<  267[H]  4.7   |  24  |  1.09    Ca    8.5      18 Mar 2025 06:29  Phos  2.9     03-18  Mg     2.1     03-18      Creatinine Trend: 1.09<--, 1.47<--, 1.80<--, 2.99<--, 3.61<--, 3.08<--                        10.0   11.82 )-----------( 145      ( 18 Mar 2025 06:30 )             29.5     PT/INR - ( 17 Mar 2025 04:35 )   PT: 14.6 sec;   INR: 1.28 ratio         PTT - ( 17 Mar 2025 04:35 )  PTT:30.5 sec           MR#[id number]  PATIENT NAME:COLE ALBA    DATE OF SERVICE: 03-18-25 @ 0630  Patient was seen and examined by Yordy Gilmore MD on    03-18-25 @ 0630  Interim events noted.Consultant notes ,Labs,Telemetry reviewed by me       HOSPITAL COURSE: HPI:  64F, hx of CHF (last TTE on 1/16/25 EF 30-35%, G2DD), DM, diabetic foot ulcer with a recent admission at [facility] for CHF exacerbation presented as a transfer for worsening R. leg pain and fluid secretion, On non-invasive imaging demonstrating severe depletion of RLE blood flow beyond the popliteal vessel at knee and similar findings in L. Was transferred to [facility] for CO2 angiogram with Dr. Baltazar. (29 Jan 2025 20:05)      INTERIM EVENTS:Patient seen at bedside ,interim events noted.      PMH -reviewed admission note, no change since admission  HEART FAILURE: Acute[ ]Chronic[ ] Systolic[ ] Diastolic[ ] Combined Systolic and Diastolic[ ]  CAD[ ] CABG[ ] PCI[ ]  DEVICES[ ] PPM[ ] ICD[ ] ILR[ ]  ATRIAL FIBRILLATION[ ] Paroxysmal[ ] Permanent[ ] CHADS2-[  ]  JAMEL[ ] CKD1[ ] CKD2[ ] CKD3[ ] CKD4[ ] ESRD[ ]  COPD[ ] HTN[ ]   DM[ ] Type1[ ] Type 2[ ]   CVA[ ] Paresis[ ]    AMBULATION: Assisted[ ] Cane/walker[ ] Independent[ ]    MEDICATIONS  (STANDING):  acetaminophen     Tablet .. 975 milliGRAM(s) Oral every 8 hours  aspirin enteric coated 81 milliGRAM(s) Oral daily  atorvastatin 40 milliGRAM(s) Oral at bedtime  bisacodyl 5 milliGRAM(s) Oral every 12 hours  chlorhexidine 4% Liquid 1 Application(s) Topical <User Schedule>  clopidogrel Tablet 75 milliGRAM(s) Oral daily  dextrose 5%. 1000 milliLiter(s) (100 mL/Hr) IV Continuous <Continuous>  dextrose 5%. 1000 milliLiter(s) (50 mL/Hr) IV Continuous <Continuous>  dextrose 50% Injectable 25 Gram(s) IV Push once  dextrose 50% Injectable 12.5 Gram(s) IV Push once  dextrose 50% Injectable 25 Gram(s) IV Push once  furosemide    Tablet 40 milliGRAM(s) Oral daily  gabapentin 300 milliGRAM(s) Oral every 8 hours  glucagon  Injectable 1 milliGRAM(s) IntraMuscular once  insulin glargine Injectable (LANTUS) 17 Unit(s) SubCutaneous at bedtime  insulin lispro (ADMELOG) corrective regimen sliding scale   SubCutaneous three times a day before meals  insulin lispro (ADMELOG) corrective regimen sliding scale   SubCutaneous at bedtime  insulin lispro Injectable (ADMELOG) 8 Unit(s) SubCutaneous three times a day with meals  methocarbamol 750 milliGRAM(s) Oral every 6 hours  metoprolol succinate ER 25 milliGRAM(s) Oral daily  Nephro-rober 1 Tablet(s) Oral daily  pantoprazole  Injectable 40 milliGRAM(s) IV Push daily  polyethylene glycol 3350 17 Gram(s) Oral daily  senna 2 Tablet(s) Oral at bedtime    MEDICATIONS  (PRN):  ALPRAZolam 0.25 milliGRAM(s) Oral daily PRN anxiety  dextrose Oral Gel 15 Gram(s) Oral once PRN Blood Glucose LESS THAN 70 milliGRAM(s)/deciliter  HYDROmorphone  Injectable 0.2 milliGRAM(s) IV Push every 6 hours PRN severe breakthrough pain  melatonin 3 milliGRAM(s) Oral at bedtime PRN Insomnia  ondansetron Injectable 4 milliGRAM(s) IV Push every 6 hours PRN Nausea and/or Vomiting  oxyCODONE    IR 5 milliGRAM(s) Oral every 4 hours PRN Moderate Pain (4 - 6)  oxyCODONE    IR 10 milliGRAM(s) Oral every 4 hours PRN Severe Pain (7 - 10)  sodium chloride 0.65% Nasal 1 Spray(s) Both Nostrils three times a day PRN Dryness  sodium chloride 0.9% lock flush 10 milliLiter(s) IV Push every 1 hour PRN Pre/post blood products, medications, blood draw, and to maintain line patency            REVIEW OF SYSTEMS:  Constitutional: [ ] fever, [ ]weight loss,  [ ]fatigue [ ]weight gain  Eyes: [ ] visual changes  Respiratory: [ ]shortness of breath;  [ ] cough, [ ]wheezing, [ ]chills, [ ]hemoptysis  Cardiovascular: [ ] chest pain, [ ]palpitations, [ ]dizziness,  [ ]leg swelling[ ]orthopnea[ ]PND  Gastrointestinal: [ ] abdominal pain, [ ]nausea, [ ]vomiting,  [ ]diarrhea [ ]Constipation [ ]Melena  Genitourinary: [ ] dysuria, [ ] hematuria [ ]Montgomery  Neurologic: [ ] headaches [ ] tremors[ ]weakness [ ]Paralysis Right[ ] Left[ ]  Skin: [ ] itching, [ ]burning, [ ] rashes  Endocrine: [ ] heat or cold intolerance  Musculoskeletal: [ ] joint pain or swelling; [ ] muscle, back, or extremity pain  Psychiatric: [ ] depression, [ ]anxiety, [ ]mood swings, or [ ]difficulty sleeping  Hematologic: [ ] easy bruising, [ ] bleeding gums    [ ] All remaining systems negative except as per above.   [ ]Unable to obtain.  [x] No change in ROS since admission      Vital Signs Last 24 Hrs  T(C): 36.5 (18 Mar 2025 20:47), Max: 36.9 (18 Mar 2025 00:03)  T(F): 97.7 (18 Mar 2025 20:47), Max: 98.4 (18 Mar 2025 00:03)  HR: 87 (18 Mar 2025 20:47) (87 - 95)  BP: 101/65 (18 Mar 2025 20:47) (100/57 - 104/62)  BP(mean): --  RR: 18 (18 Mar 2025 20:47) (18 - 18)  SpO2: 93% (18 Mar 2025 20:47) (91% - 96%)    Parameters below as of 18 Mar 2025 20:47  Patient On (Oxygen Delivery Method): room air      I&O's Summary    17 Mar 2025 07:01  -  18 Mar 2025 07:00  --------------------------------------------------------  IN: 0 mL / OUT: 300 mL / NET: -300 mL    18 Mar 2025 07:01  -  18 Mar 2025 22:18  --------------------------------------------------------  IN: 200 mL / OUT: 300 mL / NET: -100 mL        PHYSICAL EXAM:  General: No acute distress BMI-  HEENT: EOMI, PERRL  Neck: Supple, [ ] JVD  Lungs: Equal air entry bilaterally; [ ] rales [ ] wheezing [ ] rhonchi  Heart: Regular rate and rhythm; [x ] murmur   2/6 [ x] systolic [ ] diastolic [ ] radiation[ ] rubs [ ]  gallops  Abdomen: Nontender, bowel sounds present  Extremities: No clubbing, cyanosis, [ ] edema [ ]Pulses  equal and intact  Nervous system:  Alert & Oriented X3, no focal deficits  Psychiatric: Normal affect  Skin: No rashes or lesions    LABS:  03-18    131[L]  |  96  |  30[H]  ----------------------------<  267[H]  4.7   |  24  |  1.09    Ca    8.5      18 Mar 2025 06:29  Phos  2.9     03-18  Mg     2.1     03-18      Creatinine Trend: 1.09<--, 1.47<--, 1.80<--, 2.99<--, 3.61<--, 3.08<--                        10.0   11.82 )-----------( 145      ( 18 Mar 2025 06:30 )             29.5     PT/INR - ( 17 Mar 2025 04:35 )   PT: 14.6 sec;   INR: 1.28 ratio         PTT - ( 17 Mar 2025 04:35 )  PTT:30.5 sec

## 2025-03-18 NOTE — PROGRESS NOTE ADULT - SUBJECTIVE AND OBJECTIVE BOX
Name of Patient : TOMASZ ALBA  MRN: [id number]  Date of visit: 03-18-25       Subjective: Patient seen and examined. No new events except as noted.   S/P AKA   LE pain       REVIEW OF SYSTEMS:    CONSTITUTIONAL: No weakness, fevers or chills  EYES/ENT: No visual changes;  No vertigo or throat pain   NECK: No pain or stiffness  RESPIRATORY: No cough, wheezing, hemoptysis; No shortness of breath  CARDIOVASCULAR: No chest pain or palpitations  GASTROINTESTINAL: No abdominal or epigastric pain. No nausea, vomiting, or hematemesis; No diarrhea or constipation. No melena or hematochezia.  GENITOURINARY: No dysuria, frequency or hematuria  NEUROLOGICAL: No numbness or weakness  SKIN: No itching, burning, rashes, or lesions   All other review of systems is negative unless indicated above.    MEDICATIONS:  MEDICATIONS  (STANDING):  acetaminophen     Tablet .. 975 milliGRAM(s) Oral every 8 hours  aspirin enteric coated 81 milliGRAM(s) Oral daily  atorvastatin 40 milliGRAM(s) Oral at bedtime  bisacodyl 5 milliGRAM(s) Oral every 12 hours  chlorhexidine 4% Liquid 1 Application(s) Topical <User Schedule>  clopidogrel Tablet 75 milliGRAM(s) Oral daily  dextrose 5%. 1000 milliLiter(s) (100 mL/Hr) IV Continuous <Continuous>  dextrose 5%. 1000 milliLiter(s) (50 mL/Hr) IV Continuous <Continuous>  dextrose 50% Injectable 25 Gram(s) IV Push once  dextrose 50% Injectable 12.5 Gram(s) IV Push once  dextrose 50% Injectable 25 Gram(s) IV Push once  furosemide    Tablet 40 milliGRAM(s) Oral daily  gabapentin 300 milliGRAM(s) Oral every 8 hours  glucagon  Injectable 1 milliGRAM(s) IntraMuscular once  insulin glargine Injectable (LANTUS) 17 Unit(s) SubCutaneous at bedtime  insulin lispro (ADMELOG) corrective regimen sliding scale   SubCutaneous three times a day before meals  insulin lispro (ADMELOG) corrective regimen sliding scale   SubCutaneous at bedtime  insulin lispro Injectable (ADMELOG) 8 Unit(s) SubCutaneous three times a day with meals  methocarbamol 750 milliGRAM(s) Oral every 6 hours  metoprolol succinate ER 25 milliGRAM(s) Oral daily  Nephro-rober 1 Tablet(s) Oral daily  pantoprazole  Injectable 40 milliGRAM(s) IV Push daily  polyethylene glycol 3350 17 Gram(s) Oral daily  senna 2 Tablet(s) Oral at bedtime      PHYSICAL EXAM:  T(C): 36.5 (03-18-25 @ 20:47), Max: 36.9 (03-18-25 @ 00:03)  HR: 87 (03-18-25 @ 20:47) (87 - 95)  BP: 101/65 (03-18-25 @ 20:47) (100/57 - 104/62)  RR: 18 (03-18-25 @ 20:47) (18 - 18)  SpO2: 93% (03-18-25 @ 20:47) (91% - 96%)  Wt(kg): --  I&O's Summary    17 Mar 2025 07:01  -  18 Mar 2025 07:00  --------------------------------------------------------  IN: 0 mL / OUT: 300 mL / NET: -300 mL    18 Mar 2025 07:01  -  18 Mar 2025 22:41  --------------------------------------------------------  IN: 200 mL / OUT: 300 mL / NET: -100 mL          Appearance: Normal	  HEENT:  PERRLA   Lymphatic: No lymphadenopathy   Cardiovascular: Normal S1 S2, no JVD  Respiratory: normal effort , clear  Gastrointestinal:  Soft, Non-tender  Skin: No rashes,  warm to touch  Psychiatry:  Mood & affect appropriate  Musculuskeletal: AKA, dressing intact   recent labs, Imaging and EKGs personally reviewed   CODE status discussed with the patient in detail    03-17-25 @ 07:01  -  03-18-25 @ 07:00  --------------------------------------------------------  IN: 0 mL / OUT: 300 mL / NET: -300 mL    03-18-25 @ 07:01  -  03-18-25 @ 22:41  --------------------------------------------------------  IN: 200 mL / OUT: 300 mL / NET: -100 mL                            10.0   11.82 )-----------( 145      ( 18 Mar 2025 06:30 )             29.5               03-18    131[L]  |  96  |  30[H]  ----------------------------<  267[H]  4.7   |  24  |  1.09    Ca    8.5      18 Mar 2025 06:29  Phos  2.9     03-18  Mg     2.1     03-18      PT/INR - ( 17 Mar 2025 04:35 )   PT: 14.6 sec;   INR: 1.28 ratio         PTT - ( 17 Mar 2025 04:35 )  PTT:30.5 sec                   Urinalysis Basic - ( 18 Mar 2025 06:29 )    Color: x / Appearance: x / SG: x / pH: x  Gluc: 267 mg/dL / Ketone: x  / Bili: x / Urobili: x   Blood: x / Protein: x / Nitrite: x   Leuk Esterase: x / RBC: x / WBC x   Sq Epi: x / Non Sq Epi: x / Bacteria: x

## 2025-03-18 NOTE — ADVANCED PRACTICE NURSE CONSULT - REASON FOR CONSULT
Wound care consult initiated by RN to assess patient's skin for a possible sacral stage 2 pressure injury    History of Present Illness:  Reason for Admission: Transfer for CO2 angiogram; PAD and CLTI  History of Present Illness:   64F, hx of CHF (last TTE on 1/16/25 EF 30-35%, G2DD), DM, diabetic foot ulcer with a recent admission at [facility] for CHF exacerbation presented as a transfer for worsening R. leg pain and fluid secretion, On non-invasive imaging demonstrating severe depletion of RLE blood flow beyond the popliteal vessel at knee and similar findings in L. Was transferred to [facility] for CO2 angiogram with Dr. Baltazar.

## 2025-03-18 NOTE — OCCUPATIONAL THERAPY INITIAL EVALUATION ADULT - NSOTDMEREC_GEN_A_CORE
If home, pt would need RW, 3:1 commode, shower chair, polyfly w/c for long distances
If home, Pt will need 24/7 assist and supervision with all ADL and functional mobility, will require poly fly w/c, hospital bed, mechanical lift.

## 2025-03-18 NOTE — PHYSICAL THERAPY INITIAL EVALUATION ADULT - PATIENT PROFILE REVIEW, REHAB EVAL
PT initial evaluation received and chart review completed. Pt agreeable to participate in PT evaluation./yes
yes

## 2025-03-18 NOTE — PROGRESS NOTE ADULT - PROBLEM SELECTOR PLAN 1
- Check BG TID AC and HS while on PO diet  - Adjust Lantus to 17u QHS   - C/w Admelog 8u TID AC (HOLD if NPO) slow titration due to ESRD  - C/w low dose Admelog correctional scales TID AC and HS  - Will follow and adjust doses as needed  Discharge Planning:   - Likely basal/bolus regimen given kidney function (doses TBD closer to d/c). Not a candidate for SGLT2 due to leg ulcers and also significantly decreased EGFR., can consider GLP1 in addition to Basal/bolus> will need close f/u with Optho due to h/o retinopathy.   Can send Rx for ozempic 0.25mg weekly x 4weeks and increase to 0.50mg, or mounjaro 2.5mg 2.5mg x4 weeks, then increase to 5.0mg weekly. (Patient denies medullary thyroid cancer, hx of pancreatitis or MEN2)  - Please make sure patient has DM management supplies (glucometer, lancets, strips, alcohol pads, insulin pen needles).  - Patient should check BGs before meals and at bedtime. Contact PCP/endocrinology if BG is less than 70 X1, greater than  400 X1 or persistently greater than 200s.   - Endocrine follow up: can f/u with Dr. Grace, Endocrinology.   - Needs Optho/ renal/cardiac /podiatry and vascular follow up

## 2025-03-18 NOTE — PHYSICAL THERAPY INITIAL EVALUATION ADULT - LEVEL OF INDEPENDENCE: SIT/STAND, REHAB EVAL
Deferred due to left LE weakness and 10/10 pain reports with minimal movement. Pt required maximal encouragement for all mobility.
maximum assist (25% patients effort)

## 2025-03-18 NOTE — OCCUPATIONAL THERAPY INITIAL EVALUATION ADULT - LIGHT TOUCH SENSATION, RLE, REHAB EVAL
ace wrapping covering residual leg/not tested ace wrapping covering residual leg, hypersensitive to tactile stimuli/not tested

## 2025-03-18 NOTE — PROGRESS NOTE ADULT - SUBJECTIVE AND OBJECTIVE BOX
DIABETES FOLLOW UP NOTE: Saw pt earlier today    Chief Complaint: Endocrine consult requested for management of DM    INTERVAL HX: Pt stable, reports having surgical pain but otherwise tolerating POs and eating well. BG levels 200s while on present insulin doses. No hypoglycemia.       Review of Systems:  General: As above  Cardiovascular: No chest pain, palpitations  Respiratory: No SOB, no cough  GI: No nausea, vomiting, abdominal pain  Endocrine: No S&Sx of hypoglycemia    Allergies    No Known Allergies    Intolerances    Augmentin (Stomach Upset; Vomiting; Nausea)    MEDICATIONS    atorvastatin 40 milliGRAM(s) Oral at bedtime  insulin glargine Injectable (LANTUS) 16 Unit(s) SubCutaneous at bedtime  insulin lispro (ADMELOG) corrective regimen sliding scale   SubCutaneous three times a day before meals  insulin lispro (ADMELOG) corrective regimen sliding scale   SubCutaneous at bedtime  insulin lispro Injectable (ADMELOG) 6 Unit(s) SubCutaneous three times a day with meals      PHYSICAL EXAM:  VITALS: T(C): 36.6 (03-18-25 @ 11:34)  T(F): 97.8 (03-18-25 @ 11:34), Max: 98.4 (03-17-25 @ 17:43)  HR: 90 (03-18-25 @ 11:34) (81 - 95)  BP: 102/66 (03-18-25 @ 11:34) (100/57 - 117/69)  RR:  (12 - 18)  SpO2:  (91% - 100%)  Wt(kg): --  GENERAL: Female laying bed in NAD  Chest: R chest HD cath in place D&I  Abdomen: Soft, nontender, non distended  Extremities: Warm, no edema in all 4 exts. R AKA with ace bandage D&I  NEURO: Alert and able to answer all questions    LABS:  POCT Blood Glucose.: 223 mg/dL (03-18-25 @ 11:57)  POCT Blood Glucose.: 293 mg/dL (03-18-25 @ 08:15)  POCT Blood Glucose.: 201 mg/dL (03-17-25 @ 21:31)  POCT Blood Glucose.: 205 mg/dL (03-17-25 @ 17:46)  POCT Blood Glucose.: 200 mg/dL (03-17-25 @ 14:38)  POCT Blood Glucose.: 243 mg/dL (03-16-25 @ 21:14)  POCT Blood Glucose.: 259 mg/dL (03-16-25 @ 17:12)  POCT Blood Glucose.: 266 mg/dL (03-16-25 @ 15:25)  POCT Blood Glucose.: 243 mg/dL (03-16-25 @ 11:59)  POCT Blood Glucose.: 248 mg/dL (03-16-25 @ 07:58)  POCT Blood Glucose.: 257 mg/dL (03-15-25 @ 21:02)  POCT Blood Glucose.: 214 mg/dL (03-15-25 @ 16:47)                            10.0   11.82 )-----------( 145      ( 18 Mar 2025 06:30 )             29.5       03-18    131[L]  |  96  |  30[H]  ----------------------------<  267[H]  4.7   |  24  |  1.09    eGFR: 57[L]    Ca    8.5      03-18  Mg     2.1     03-18  Phos  2.9     03-18        A1C with Estimated Average Glucose Result: 11.6 % (01-24-25 @ 05:40)      Estimated Average Glucose: 286 mg/dL (01-24-25 @ 05:40)        01-24 Chol 122 Direct LDL -- LDL calculated 66 HDL 39[L] Trig 89

## 2025-03-18 NOTE — OCCUPATIONAL THERAPY INITIAL EVALUATION ADULT - RANGE OF MOTION EXAMINATION, LOWER EXTREMITY
bilateral LE Active ROM was WFL  (within functional limits)
except R LE AKA/bilateral LE Active ROM was WFL  (within functional limits)

## 2025-03-18 NOTE — PROGRESS NOTE ADULT - SUBJECTIVE AND OBJECTIVE BOX
Follow-up Pulm Progress Note    No new respiratory events overnight.  Denies SOB/CP.     Medications:  MEDICATIONS  (STANDING):  aspirin enteric coated 81 milliGRAM(s) Oral daily  atorvastatin 40 milliGRAM(s) Oral at bedtime  bisacodyl 5 milliGRAM(s) Oral every 12 hours  chlorhexidine 4% Liquid 1 Application(s) Topical <User Schedule>  clopidogrel Tablet 75 milliGRAM(s) Oral daily  dextrose 5%. 1000 milliLiter(s) (100 mL/Hr) IV Continuous <Continuous>  dextrose 5%. 1000 milliLiter(s) (50 mL/Hr) IV Continuous <Continuous>  dextrose 50% Injectable 25 Gram(s) IV Push once  dextrose 50% Injectable 12.5 Gram(s) IV Push once  dextrose 50% Injectable 25 Gram(s) IV Push once  glucagon  Injectable 1 milliGRAM(s) IntraMuscular once  insulin glargine Injectable (LANTUS) 16 Unit(s) SubCutaneous at bedtime  insulin lispro (ADMELOG) corrective regimen sliding scale   SubCutaneous three times a day before meals  insulin lispro (ADMELOG) corrective regimen sliding scale   SubCutaneous at bedtime  insulin lispro Injectable (ADMELOG) 6 Unit(s) SubCutaneous three times a day with meals  metoprolol succinate ER 25 milliGRAM(s) Oral daily  Nephro-rober 1 Tablet(s) Oral daily  pantoprazole  Injectable 40 milliGRAM(s) IV Push daily  polyethylene glycol 3350 17 Gram(s) Oral daily  senna 2 Tablet(s) Oral at bedtime    MEDICATIONS  (PRN):  ALPRAZolam 0.25 milliGRAM(s) Oral daily PRN anxiety  dextrose Oral Gel 15 Gram(s) Oral once PRN Blood Glucose LESS THAN 70 milliGRAM(s)/deciliter  HYDROmorphone  Injectable 2 milliGRAM(s) IV Push every 4 hours PRN Severe Pain (7 - 10)  melatonin 3 milliGRAM(s) Oral at bedtime PRN Insomnia  ondansetron Injectable 4 milliGRAM(s) IV Push every 6 hours PRN Nausea and/or Vomiting  sodium chloride 0.65% Nasal 1 Spray(s) Both Nostrils three times a day PRN Dryness  sodium chloride 0.9% lock flush 10 milliLiter(s) IV Push every 1 hour PRN Pre/post blood products, medications, blood draw, and to maintain line patency          Vital Signs Last 24 Hrs  T(C): 36.6 (18 Mar 2025 08:40), Max: 36.9 (17 Mar 2025 17:43)  T(F): 97.8 (18 Mar 2025 08:40), Max: 98.4 (17 Mar 2025 17:43)  HR: 95 (18 Mar 2025 08:40) (80 - 95)  BP: 104/60 (18 Mar 2025 08:40) (96/51 - 117/69)  BP(mean): 76 (17 Mar 2025 15:45) (67 - 83)  RR: 18 (18 Mar 2025 08:40) (12 - 18)  SpO2: 92% (18 Mar 2025 08:40) (91% - 100%)    Parameters below as of 18 Mar 2025 08:40  Patient On (Oxygen Delivery Method): room air              03-17 @ 07:01  -  03-18 @ 07:00  --------------------------------------------------------  IN: 0 mL / OUT: 300 mL / NET: -300 mL          LABS:                        10.0   11.82 )-----------( 145      ( 18 Mar 2025 06:30 )             29.5     03-18    131[L]  |  96  |  30[H]  ----------------------------<  267[H]  4.7   |  24  |  1.09    Ca    8.5      18 Mar 2025 06:29  Phos  2.9     03-18  Mg     2.1     03-18            CAPILLARY BLOOD GLUCOSE      POCT Blood Glucose.: 293 mg/dL (18 Mar 2025 08:15)    PT/INR - ( 17 Mar 2025 04:35 )   PT: 14.6 sec;   INR: 1.28 ratio         PTT - ( 17 Mar 2025 04:35 )  PTT:30.5 sec  Urinalysis Basic - ( 18 Mar 2025 06:29 )    Color: x / Appearance: x / SG: x / pH: x  Gluc: 267 mg/dL / Ketone: x  / Bili: x / Urobili: x   Blood: x / Protein: x / Nitrite: x   Leuk Esterase: x / RBC: x / WBC x   Sq Epi: x / Non Sq Epi: x / Bacteria: x      Physical Examination:  PULM: Decreased BS at bases   CVS: S1, S2 heard    RADIOLOGY REVIEWED    CT chest: < from: CT Angio Chest PE Protocol w/ IV Cont (03.07.25 @ 11:32) >    ACC: [id number] EXAM:  CT ANGIO CHEST PULM ART WAWIC   ORDERED BY: JORGE ALBERTO NEWMAN     PROCEDURE DATE:  03/07/2025          INTERPRETATION:  CLINICAL INFORMATION: Shortness of breath, evaluate for   pulmonary embolism.    COMPARISON: CT chest 1/25/2025    CONTRAST/COMPLICATIONS:  IV Contrast: Omnipaque 350  70 cc administered   30 cc discarded  Oral Contrast: NONE  .    PROCEDURE:  CT Angiography of the Chest.  Sagittal and coronal reformats were performed as well as 3D (MIP)   reconstructions.    FINDINGS:    LUNGS AND LARGE AIRWAYS: Minimal secretions within the left main   bronchus. Peripheral predominant groundglass opacities in all lobes.   Partial atelectasis of the bilateral lower lobes.  PLEURA: Small left and moderate right pleuraleffusions.  VESSELS: No pulmonary embolus is noted. Aortic calcifications. Coronary   artery calcifications. Right IJ central venous catheter with tip   terminating in the distal SVC.  HEART: Cardiomegaly. Trace pericardial effusion.  MEDIASTINUM ANDHILA: No lymphadenopathy.  CHEST WALL AND LOWER NECK: Within normal limits.  VISUALIZED UPPER ABDOMEN: Vascular calcifications.  BONES: Within normal limits.    IMPRESSION:  *  No pulmonary embolism.  *  Moderate right and small left pleural effusions.  *  Nonspecific peripheral groundglass opacities in the bilateral lungs,   slightly more prominent when compared to prior examination on 1/25/2025.        --- End of Report ---      < end of copied text >

## 2025-03-18 NOTE — PHYSICAL THERAPY INITIAL EVALUATION ADULT - IMPAIRMENTS FOUND, PT EVAL
aerobic capacity/endurance/gait, locomotion, and balance/muscle strength
aerobic capacity/endurance/decreased midline orientation/ergonomics and body mechanics/gait, locomotion, and balance/gross motor/muscle strength/posture/ROM/tone

## 2025-03-18 NOTE — PROGRESS NOTE ADULT - ASSESSMENT
64F, hx of CHF (last TTE on 1/16/25 EF 30-35%, G2DD), DM, diabetic foot ulcer with a recent admission at [facility] for CHF exacerbation 1/14-1/17 p/w worsening R. leg pain and fluid secretion, was meeting sepsis criteria with podiatry having low suspicion for right cellulitis and admitted for continued management of HF exacerbation and needing ischemic eval.     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  -Left leg Arterial Duplex: Slightly tardus parvus waveform of the left popliteal artery is noted, for which underlying disease cannot be excluded. Posterior tibial artery waveform is nonpulsatile. Anterior tibial artery tardus parvus flow is noted.   Transferred to [facility] for CO2 angiogram as per vascular surgery    #  PAD Wound of lower extremity.   ·  Plan: Podiatry following, b/l serous bullae, no concerns for infection  Found to have RLE coolness  -Right Leg Arterial Duplex: Popliteal artery is occluded with negligible flow of right trifurcation arteries.  -Left leg Arterial Duplex: Slightly tardus parvus waveform of the left popliteal artery is noted, for which underlying disease cannot be excluded. Posterior tibial artery waveform is nonpulsatile. Anterior tibial artery tardus parvus flow is noted.  -Started on heparin gtt and dobutamine gtt -Will transfer to [facility] for CO2 angiogram as per vascular surgery as not candidate for CTA due to worsening SCr  -Keep compression dressing  -LE elevation above heart level at rest.  -Transferred to [facility] for CO2 angiogram   - Overall this patient is at   intermediate  risk (for cardiac death, nonfatal myocardial infarction, and nonfatal cardiac arrest perioperatively for this intermediate  risk procedure).   No cardiac contraindications for CO2 vangiogram  There  are  no further recommendation for risk stratifying imaging/stress testing prior to planned surgery  Seen by Podiatry-No acute pod intervention, local wound care only-   PAD with rest ischemia  s/p AT/Popliteal angioplasty on DAPT  - RLE extensive gangrnous changes to foot dorsum, ankle, heel dorsomedial and lateral lower leg, ischemic changes to 2nd digit and hallux,  Left foot hallux distal tuft well adhered eschar, no acute signs of infection.   -Seen by vascular Plan for AKA vs BKA   Vascular planning for Right AKA Discussed with Vascular she is as in optimal cardiac condition as possible to undergo surgery under GA  Her HF is compensated and has had PCI  - Overall this patient is at   intermediate to high but not prohobitive risk (for cardiac death, nonfatal myocardial infarction, and nonfatal cardiac arrest perioperatively for this intermediate risk procedure).     The patient is however without evidence of ACS, Decompensated Heart Failure,Obstructive Valvular Heart disease or Unstable arrhythmia.   There  are  no further recommendation for risk stratifying imaging/stress testing prior to planned surgery  She is in as optimal condition as possible for the procedure      # HFrEF (congestive heart failure). Ischemic Cardiomyopathy  ·  Plan: Hx of HF, previous admission in January, on home Lasix 40 qD, Metoprolol Succ 25 qD. Not on Entresto due to insurance issues  Last TTE: LVSF moderately decreased w/ EF 30-35 %. Moderate G2DD. Mild MR  Stress test: small-sized, moderate defect(s) in the apical wall that is predominantly fixed suggestive of an infarction with minimal marcus-infarct ischemia  Ischemic cardiomyopathy  GDMT on hold 2/2 JAMEL  Repeat TTE EF 20% with WMA RAP~~8  Repeat Limited TTE  Taper off Milrinone and start GDMT  Is currently off Hydral/Isdn and Bumex 2/2 soft BP  Continue HD with UF had 2900mL removed yesterday will stop Milrinone and observe  Started  low dose BBlocker Metoprolol tartate 12.5 mg PO q8  03/10-Off Midodrine will reattempt Hyd/Isdn  03/11-Daily HD/UF   03/12-Awake s/p Tunelled HD catheter-Plan for Discharge to [facility]  03/13-EP evaluation Arias episodes  03/14-Increasing necrosis noted Right LE Vascular planning for Right AKA   03/15-Right AKAvs BKA on Monday 03/16- Awake Sinus Rhythm clinically stable for OR tomorrow    03/17-Somnolent not dyspneac for OR today    # CAD  LHC-RCA , severe ostial LM disease, diffuse disease in LAD and LCx, some L to R collaterals-seen by CTS advise cMR for viability poor target vessels for CABG-?High risk LM/LAD PCI  Had cMR-LVEF is 25%, greater than 75% subendocardial scarring involving the basal to mid inferior wall of the left ventricle, left ventricular transmural scarring involving the apical septal wall and likely near transmural scarring of the apical lateral wall. 50% scarring of the left ventricular basal inferoseptal wall.  03/05-s/p PCI-LM/LAD   Continue  uninterrupted DAPT        # JAMEL on CKD   Creatinine Trend: 1.47 <--1.80<--2.99 <--3.61 <--3.08<-- 4.40 <--3.84<--, 3.05<--, 4.46<--, 3.63<--, 2.35<--, 2.36<---3.38<--(HD)-- 4.76<--4.07<---3.90<-- 1.17  Has had 3 sessions HD for interrmittent HD to allow contrast based procedures is non oliguric  Plan to continue HD likely will need extended RRT  Srini change on 03/11  Permacath  For short HD treatment preop

## 2025-03-18 NOTE — OCCUPATIONAL THERAPY INITIAL EVALUATION ADULT - BALANCE TRAINING, PT EVAL
GOAL: Patient will improve seated dynamic balance by 1/2 grade within 4 weeks.
GOAL: Pt will demonstrate fair+ dynamic standing balance during ADL tasks within 4 weeks.

## 2025-03-18 NOTE — PHYSICAL THERAPY INITIAL EVALUATION ADULT - BALANCE DISTURBANCE, IDENTIFIED IMPAIRMENT CONTRIBUTE, REHAB EVAL
impaired coordination/impaired motor control/pain/impaired postural control/decreased ROM/impaired sensory feedback/decreased strength
pain/decreased strength

## 2025-03-18 NOTE — PROGRESS NOTE ADULT - ASSESSMENT
65y/o F w/h/o uncontrolled T2DM (A1C 11.6%) on Tresiba and CeQur insulin patch PTA. DM c/b PAD, retinopathy, CKD, CAD. Also h/oType 2 DM, HTN, HLD. Presented to OSH with worsening right leg pain and fluid secretion. Transferred to [facility] for CO2 angiogram. Found to have Low EF to 20% in OSBALDO. s/p high risk PCI on 3/3. s/p L AKA and on HD. Endocrine consulted for Type 2 DM management, uncontrolled. Pt tolerating POs with BG levels above goal while on present insulin doses> will increase insulin doses to BG goal 100-180mg/dL inpatient without hypoglycemia.       Home regimen: Tresiba 40 units, CeQur insulin patch about 2-10 units TID with meals

## 2025-03-18 NOTE — PHYSICAL THERAPY INITIAL EVALUATION ADULT - BED MOBILITY TRAINING, PT EVAL
GOAL: pt will perform bed mob independently by 2 weeks
GOAL: Pt will perform all bed mobility with minimal assistance in 3 weeks.

## 2025-03-18 NOTE — PROGRESS NOTE ADULT - ASSESSMENT
63 YO F with PMHx of HFrEF 30-35 and grade 2 ddfxn (last TTE on 01/16/25), DM2, and diabetic foot ulcer. Recent admission at [facility] for ADHF. Patient now represents to OSH and ultimately to [facility] as a transfer for worsening right leg pain and fluid secretion. As per documentation, patient was reported to have severe depletion of RLE blood flow beyond the popliteal vessel at knee and similar findings in the left. Patient was transferred to [facility] for CO2 angiogram with Dr. Baltazar. Of note, patient also with reported visual disturbance for which patient was seen and evaluated by opthalmology. Internal Medicine has been consulted on Ms. Kirby's care for medical management.       # Foot ulcers with worsening RLE pain second to pop artery occlusion +/- diabetic ulcers   - RLE Arterial Duplex with popliteal artery occlusion   - LLE Arterial Duplex with underlying disease cannot be excluded   - s/p angio with pop and AT VERA on 2/24   - c/b necrosis of RLE   - S/P AKA 3/17   - Continue on Lipitor and DAPT  - Continue pain control with oxy  - Wound care called for dressing recs   - pain management       # ADHF/ BiV HFrEF 20   - Recent admission at [facility] for ADHF and on dobutamine outpatient  - TTE 1/16 with EF 30-35 with moderately reduced LVSF and grade 2 ddfxn, normal RVSF , trace TR/ NV, and trace pericardial effusion  - RPT TTE with EF 20, severely decreased LV, decreased RVSF with TAPSE 1.2, and regional wall motion abnormalities present with entire septum, entire apex, entire inferior wall, mid inferolateral segment, and mid anterolateral segment are hypokinetic.   - RHC with RA 20, PA 49/29 (40), PCWP 35, mVO2 51.1, CO/CI 3.8/2.2, SVR 1095 w/ Multivessel CAD   - s/p zaroxyln 10 QD to augment diuresis   - s/p bumex GTT   - s/p HTS to augment diuresis   - RPT limited TTE w/ LVSF severely decreased, reduced RVSF, LVOT VTI 12, mild to mod TR, and mildly elevated right atrial pressure  - s/p bumex 4 IV QD, but anuric and now dc'ed   - Case discussed with EP and needs to be on GDMT for 3 months before AICD placement and should be stabilized off of inotropic support.    - RPT ECHO post cath with EF 28 %, regional WMA, multiple segmental abnormalities exist, and reduced RVSF    - Case discussed with HF and cards and monitoring off milrinone GTT   - Continue on toprol 25 and hydral on nonHD days. EP cleared for BB  - Continue volume removal with HD   - Monitor I and O   - Monitor volume status   - Check daily weights    - Monitor on telemetry  - Monitor electrolytes   - Cardio, HF, Renal, and EP evals appreciated; F/u recs     # Bradycardia   - SB to 48 BPM at 0400 while asleep on 3/11 and likely related to BB , however last dose prior to event was 3/10 at 0600.  - Monitor telemetry  - EP eval appreciated; F/u recs --> Cleared for BB     # Tachycardia and Hypoxia  - Tachycardiac and on RA with SPO2 running 90-92   - Could be second to low cardiac output   - CTA with no PE  - Duplex negative for DVT   - On Toprol XL 25 PO QD.   - Monitor HR   - Monitor closely on Tele  - Cardio eval appreciated; F/u recs    # JAMEL with Hyponatremia and Metabolic Acidosis   - Baseline CRE 0.7-1.2 and increased to ~4  - As per OSH documentation, JAMEL was thought to be second to Unasyn/ Bumex / Entresto use and Hypotension   - US RENAL with no hydronephrosis  - Likely cardiorenal induced with low flow state in ADHF, however now rising again and concern for milrinone vs overdiuresis (prerenal) vs cardiorenal.   - Milrinone adjusted and diuresis turned off, however no improvement/ worsened in renal function noted.   - s/p shiley placement and started on HD 2/20  - s/p permacath 3/11  - Continue on HD per renal  - Avoid nephrotoxic agents  - Monitor Cr and daily BMP   - Renal and HF evals appreciated; F/u recs    # CAD with TVD   - NST abnormal with small-sized, moderate defect in apical wall that is predominantly fixed suggestive of an infarction with minimal marcus-infarct ischemia. Post stress LVEF 25.  - s/p LHC with RCA , severe ostial LM disease, diffuse disease in LAD and LCx, some L to R collaterals (Multivessel CAD)  - Case discussed with CTSx and NOT a candidate for CABG due to comorbidities  - Cardiac MRI with greater than 75% subendocardial scarring of the basal to mid-inferior wall of the LV and 50% scarring of the left ventricular basal inferoseptal wall. There is also left ventricular transmural scarring involving the apical septal wall and likely near transmural scarring of the apical lateral wall.  - s/p RPT LHC 3/4 with LM 80 s/p POBA/ VERA with LM 1, pLAD 90 s/p POBA/ VERA with pLAD 1, and Cx 80 s/p POBA with Cx 10.   - DAPT and Statin per cardiology   - IC, Cards and HF following     # BL LE Edema likely in setting of ADHF  - Remove volume as per Cardio, HF and Renal   - BL LE elevation and compression  - LE Duplex neg   - Monitor for now    # Pericardial effusion likely in setting ADHF  - TTE with trace pericardial effusion   - CT Chest with small pericardial effusion   - Denies chest pain, palpitations, chest tightness or discomfort, shortness of breath or dyspnea   - Repeat serial TTE for further evaluation   - Diuresis per cardio/ renal.  - Monitor on telemetry  - Cardio following    # Pleural effusion likely in setting ADHF  - CT Chest with small BL pleural effusions  - Monitor O2 saturation  - Supplement to maintain > 90%   - Diuresis / HD per cardio/ renal.     # Hypernatremia to hyponatremia  - Hypernatremia likely from HTS vs over diuresis/ intravascular dehydration. HTS and diuresis stopped with improved sodium   - Hyponatremia now noted second to volume overload   - Avoid overcorrection > 6-8 mEq in 24 hours  - Monitor sodium with HD    # Transaminitis  - Likely 2/2 hepatic congestion   - Volume removal with HD as tolerated  - Trend LFTs, if uptrend post-HD initiation, check ABD US  - Serial ABD Examinations    # Leukocytosis likely in setting of BL LE ulcers with pop occlusion   - BCx negative   - UCx with probable contamination   - S/P unasyn for ABX.   - Leukocytosis fluctuating, however appears nontoxic with no fever spikes and monitoring closely off ABX  - If febrile check pan cultures   - Trend CBC, temp curve, VS and adjust as tolerated      # Visual Disturbance  - CT Head w/ old infarcts  - On ASA and Statin   - Opthalmology eval appreciated    # Indigestion and Constipation   - PPI, miralax and senna continued  - Monitor BMs    # Anemia likely mixed AOCD vs iron deficiency   - HH stable in 9s on HSQ  - Anemia panel with AOCD   - Started on venofer, but ferritin high and now stopped   - Continue on EPO with HD per renal  - Monitor HH   - Transfuse for Hgb < 8  - Maintain active T/S    # Diabetes Mellitus A1C 11.6   - Continue on lantus 13 with lispro 5 and ISS   - Diabetic DASH diet   - Monitor and adjust glucose levels PRN   - Endocrine following; F/u recs     # HLD and HLD  - Continue on lipitor and toprol  - Monitor BP    # DISPO TBD  - Palliative care called and patient remains FULL CODE

## 2025-03-18 NOTE — OCCUPATIONAL THERAPY INITIAL EVALUATION ADULT - DIAGNOSIS, OT EVAL
s/p Right AKA 3/17, decreased functional activity endurance, decreased strength, Impaired balance, pain, limited ROM, impacting ability to perform ADL and functional mobility.
Pt p/w pain, decreased strength, ROM, balance, impacting ADL.

## 2025-03-18 NOTE — OCCUPATIONAL THERAPY INITIAL EVALUATION ADULT - TRANSFER TRAINING, PT EVAL
GOAL: Pt will perform functional transfers w/supervision within 4 weeks.
GOAL: Patient will perform functional transfers with contact guard assistance x 1 by 4 weeks.

## 2025-03-18 NOTE — PROGRESS NOTE ADULT - ASSESSMENT
64y Female with history of CHF presents as a transfer for LE CO2 angiogram. Nephrology consulted for elevated Scr.    1) JAMEL: likely due to ATN for which patient initiated on RRT on 2/20. Last HD on 3/14. Patient now with evidence of renal recovery given improving pre-HD Scr. No need for additional HD at this time. S/P TDC placement on 3/11. Will likely remove TDC this week. S/P stiles for urinary retention. Avoid nephrotoxins.    2) HTN: BP low normal. Continue with current medications.    3) LE edema: Start lasix 40 mg PO daily. TTE with severely decreased LVSF. Monitor UO.     4) Anemia: Hb acceptable with low TSAT. No IV iron given elevated ferritin. S/P Epo 8K X 1 dose 3/12. Monitor Hb.    5) Hyponatremia: Continue with 1L [facility] NEPHROLOGY  Raji Waterman M.D.  Mars Feliz D.O.  Kelley Parks M.D.  MD Nancy Kulkarni, MSN, ANP-C    Telephone: [phone]  Facsimile: [phone]    [facility] [place] [address], [address]  [place] [address]

## 2025-03-18 NOTE — CONSULT NOTE ADULT - ASSESSMENT
HPI:  64F, hx of CHF (last TTE on 1/16/25 EF 30-35%, G2DD), DM, diabetic foot ulcer [facility] presented as a transfer for worsening R. leg pain and fluid secretion, On non-invasive imaging demonstrating severe depletion of RLE blood flow beyond the popliteal vessel at knee and similar findings in L. Was transferred to [facility] for CO2 angiogram with Dr. Baltazar. (29 Jan 2025 20:05)          Current out- patient pain regimen:    Out Patient Pain Management provider:     [place] Prescription Monitoring Program: Reference #      Plan discussed with primary team:      Continue Lidoderm  Warm/cool packs for comfort  Continue Bowel Regimen  Holistic Therapy  Incentive Spirometer  PT per primary team  Monitor for sedation, respiratory depression  Recommend GAP consult (Geriatric and Palliative Care) for this patient with advanced illness  Follow up with                 for continued pain management after discharge  Out-patient pain practice list provided for pain management after discharge  Watch for signs and symptoms of withdrawal  Narcan Rescue Kit on discharge  (Naloxone 4 mg/0.1 ml nasal spray - 1 spray q 2-3 minutes alternating between nostrils)  Enrolled in Complex Care Plan given multiple/frequent hospitalizations (with drug-seeking behavior)    This is acute pain, managed by primary team    Will hold on Chronic Pain Service consult    Signing off    Time spent on encounter:        Minutes    Chronic Pain Service  [phone] 64F Hx of CHF, DM, diabetic foot ulcer with a recent admission at [facility] for CHF exacerbation.    Pt admitted 48 days ago for right lower limb ischemia, pain and ulcers.   - RLE Arterial Duplex with popliteal artery occlusion   - s/p angio with pop and AT VERA on 2/24   - c/b necrosis of RLE   - 3/17 s/p right AKA  Hospital course includes Bradycardia, JAMEL, hyponatremia, Hemodialysis    Current out- patient pain regimen: None  Out Patient Pain Management provider: None    Pt tearful, c/o severe RLE sharp, stabbing pains and spasms. Denies phantom limb pain.     Plan discussed with primary team:  Start Gabapentin 300 mg Q8h  Start Robaxin 750 mg Q6h  Tylenol 975 mg Q8h   Oxycodone 5 mg Q4h PRN moderate pain and 10 mg Q4h PRN severe pain  Dilaudid IV 0.2 mg Q6h PRN severe breakthrough pain    Warm/cool packs for comfort  Bowel Regimen  Incentive Spirometer  PT per primary team  Monitor for sedation, respiratory depression  Out-patient pain practice list can be provided for pain management after discharge  Narcan Rescue Kit on discharge (Naloxone 4 mg/0.1 ml nasal spray - 1 spray q 2-3 minutes alternating between nostrils)    Time spent on encounter:   55     Minutes    Chronic Pain Service  [phone]

## 2025-03-18 NOTE — PROGRESS NOTE ADULT - SUBJECTIVE AND OBJECTIVE BOX
General Surgery Progress Note    Overnight: ADAM  Subjective: Patient seen and examined on rounds.    Objective:  Vitals:  T(C): 36.6 (03-18-25 @ 08:40), Max: 36.9 (03-17-25 @ 17:43)  HR: 95 (03-18-25 @ 08:40) (80 - 95)  BP: 104/60 (03-18-25 @ 08:40) (96/51 - 117/69)  RR: 18 (03-18-25 @ 08:40) (12 - 18)  SpO2: 92% (03-18-25 @ 08:40) (91% - 100%)  Wt(kg): --    03-17 @ 07:01  -  03-18 @ 07:00  --------------------------------------------------------  IN:  Total IN: 0 mL    OUT:    Intermittent Catheterization - Urethral (mL): 300 mL  Total OUT: 300 mL    Total NET: -300 mL          Physical Exam:  General Appearance: no acute distress, NTND   Chest: airway intact, non-labored breathing  CV: no JVD, palpable pulses b/l  Extremities: R AKA dressing c/d/i, R brach site soft and c/d/i       Labs:                        10.0   11.82 )-----------( 145      ( 18 Mar 2025 06:30 )             29.5     03-18    131[L]  |  96  |  30[H]  ----------------------------<  267[H]  4.7   |  24  |  1.09    Ca    8.5      18 Mar 2025 06:29  Phos  2.9     03-18  Mg     2.1     03-18        PT/INR - ( 17 Mar 2025 04:35 )   PT: 14.6 sec;   INR: 1.28 ratio         PTT - ( 17 Mar 2025 04:35 )  PTT:30.5 sec  Urinalysis Basic - ( 18 Mar 2025 06:29 )    Color: x / Appearance: x / SG: x / pH: x  Gluc: 267 mg/dL / Ketone: x  / Bili: x / Urobili: x   Blood: x / Protein: x / Nitrite: x   Leuk Esterase: x / RBC: x / WBC x   Sq Epi: x / Non Sq Epi: x / Bacteria: x

## 2025-03-19 LAB
ANION GAP SERPL CALC-SCNC: 12 MMOL/L — SIGNIFICANT CHANGE UP (ref 5–17)
BUN SERPL-MCNC: 28 MG/DL — HIGH (ref 7–23)
CALCIUM SERPL-MCNC: 8.4 MG/DL — SIGNIFICANT CHANGE UP (ref 8.4–10.5)
CHLORIDE SERPL-SCNC: 95 MMOL/L — LOW (ref 96–108)
CO2 SERPL-SCNC: 23 MMOL/L — SIGNIFICANT CHANGE UP (ref 22–31)
CREAT SERPL-MCNC: 0.99 MG/DL — SIGNIFICANT CHANGE UP (ref 0.5–1.3)
EGFR: 64 ML/MIN/1.73M2 — SIGNIFICANT CHANGE UP
EGFR: 64 ML/MIN/1.73M2 — SIGNIFICANT CHANGE UP
GLUCOSE BLDC GLUCOMTR-MCNC: 210 MG/DL — HIGH (ref 70–99)
GLUCOSE BLDC GLUCOMTR-MCNC: 230 MG/DL — HIGH (ref 70–99)
GLUCOSE BLDC GLUCOMTR-MCNC: 319 MG/DL — HIGH (ref 70–99)
GLUCOSE BLDC GLUCOMTR-MCNC: 319 MG/DL — HIGH (ref 70–99)
GLUCOSE SERPL-MCNC: 200 MG/DL — HIGH (ref 70–99)
HCT VFR BLD CALC: 28.4 % — LOW (ref 34.5–45)
HGB BLD-MCNC: 9.5 G/DL — LOW (ref 11.5–15.5)
MCHC RBC-ENTMCNC: 24.8 PG — LOW (ref 27–34)
MCHC RBC-ENTMCNC: 33.5 G/DL — SIGNIFICANT CHANGE UP (ref 32–36)
MCV RBC AUTO: 74.2 FL — LOW (ref 80–100)
NRBC BLD AUTO-RTO: 0 /100 WBCS — SIGNIFICANT CHANGE UP (ref 0–0)
PLATELET # BLD AUTO: 166 K/UL — SIGNIFICANT CHANGE UP (ref 150–400)
POTASSIUM SERPL-MCNC: 4.3 MMOL/L — SIGNIFICANT CHANGE UP (ref 3.5–5.3)
POTASSIUM SERPL-SCNC: 4.3 MMOL/L — SIGNIFICANT CHANGE UP (ref 3.5–5.3)
RBC # BLD: 3.83 M/UL — SIGNIFICANT CHANGE UP (ref 3.8–5.2)
RBC # FLD: 22.2 % — HIGH (ref 10.3–14.5)
SODIUM SERPL-SCNC: 130 MMOL/L — LOW (ref 135–145)
WBC # BLD: 11.09 K/UL — HIGH (ref 3.8–10.5)
WBC # FLD AUTO: 11.09 K/UL — HIGH (ref 3.8–10.5)

## 2025-03-19 PROCEDURE — 99232 SBSQ HOSP IP/OBS MODERATE 35: CPT | Mod: 25

## 2025-03-19 RX ORDER — INSULIN LISPRO 100 U/ML
10 INJECTION, SOLUTION INTRAVENOUS; SUBCUTANEOUS
Refills: 0 | Status: DISCONTINUED | OUTPATIENT
Start: 2025-03-20 | End: 2025-03-24

## 2025-03-19 RX ORDER — LISINOPRIL 5 MG/1
2.5 TABLET ORAL AT BEDTIME
Refills: 0 | Status: DISCONTINUED | OUTPATIENT
Start: 2025-03-19 | End: 2025-03-23

## 2025-03-19 RX ORDER — OXYCODONE HYDROCHLORIDE 30 MG/1
5 TABLET ORAL EVERY 4 HOURS
Refills: 0 | Status: DISCONTINUED | OUTPATIENT
Start: 2025-03-19 | End: 2025-03-25

## 2025-03-19 RX ORDER — INSULIN GLARGINE-YFGN 100 [IU]/ML
18 INJECTION, SOLUTION SUBCUTANEOUS AT BEDTIME
Refills: 0 | Status: DISCONTINUED | OUTPATIENT
Start: 2025-03-19 | End: 2025-03-24

## 2025-03-19 RX ADMIN — OXYCODONE HYDROCHLORIDE 5 MILLIGRAM(S): 30 TABLET ORAL at 21:53

## 2025-03-19 RX ADMIN — Medication 2 TABLET(S): at 21:55

## 2025-03-19 RX ADMIN — OXYCODONE HYDROCHLORIDE 5 MILLIGRAM(S): 30 TABLET ORAL at 22:00

## 2025-03-19 RX ADMIN — Medication 975 MILLIGRAM(S): at 21:53

## 2025-03-19 RX ADMIN — INSULIN LISPRO 2: 100 INJECTION, SOLUTION INTRAVENOUS; SUBCUTANEOUS at 12:11

## 2025-03-19 RX ADMIN — Medication 40 MILLIGRAM(S): at 12:13

## 2025-03-19 RX ADMIN — METHOCARBAMOL 750 MILLIGRAM(S): 500 TABLET, FILM COATED ORAL at 17:02

## 2025-03-19 RX ADMIN — INSULIN LISPRO 2: 100 INJECTION, SOLUTION INTRAVENOUS; SUBCUTANEOUS at 21:56

## 2025-03-19 RX ADMIN — Medication 0.25 MILLIGRAM(S): at 21:52

## 2025-03-19 RX ADMIN — METHOCARBAMOL 750 MILLIGRAM(S): 500 TABLET, FILM COATED ORAL at 12:13

## 2025-03-19 RX ADMIN — Medication 975 MILLIGRAM(S): at 06:01

## 2025-03-19 RX ADMIN — Medication 975 MILLIGRAM(S): at 15:35

## 2025-03-19 RX ADMIN — Medication 81 MILLIGRAM(S): at 12:12

## 2025-03-19 RX ADMIN — GABAPENTIN 300 MILLIGRAM(S): 400 CAPSULE ORAL at 14:34

## 2025-03-19 RX ADMIN — GABAPENTIN 300 MILLIGRAM(S): 400 CAPSULE ORAL at 21:52

## 2025-03-19 RX ADMIN — FUROSEMIDE 40 MILLIGRAM(S): 10 INJECTION INTRAMUSCULAR; INTRAVENOUS at 05:58

## 2025-03-19 RX ADMIN — INSULIN GLARGINE-YFGN 18 UNIT(S): 100 INJECTION, SOLUTION SUBCUTANEOUS at 21:57

## 2025-03-19 RX ADMIN — METHOCARBAMOL 750 MILLIGRAM(S): 500 TABLET, FILM COATED ORAL at 06:00

## 2025-03-19 RX ADMIN — INSULIN LISPRO 2: 100 INJECTION, SOLUTION INTRAVENOUS; SUBCUTANEOUS at 09:01

## 2025-03-19 RX ADMIN — INSULIN LISPRO 8 UNIT(S): 100 INJECTION, SOLUTION INTRAVENOUS; SUBCUTANEOUS at 17:00

## 2025-03-19 RX ADMIN — Medication 5 MILLIGRAM(S): at 06:03

## 2025-03-19 RX ADMIN — GABAPENTIN 300 MILLIGRAM(S): 400 CAPSULE ORAL at 05:59

## 2025-03-19 RX ADMIN — INSULIN LISPRO 8 UNIT(S): 100 INJECTION, SOLUTION INTRAVENOUS; SUBCUTANEOUS at 09:01

## 2025-03-19 RX ADMIN — METHOCARBAMOL 750 MILLIGRAM(S): 500 TABLET, FILM COATED ORAL at 00:33

## 2025-03-19 RX ADMIN — INSULIN LISPRO 8 UNIT(S): 100 INJECTION, SOLUTION INTRAVENOUS; SUBCUTANEOUS at 12:11

## 2025-03-19 RX ADMIN — Medication 975 MILLIGRAM(S): at 14:35

## 2025-03-19 RX ADMIN — INSULIN LISPRO 4: 100 INJECTION, SOLUTION INTRAVENOUS; SUBCUTANEOUS at 17:00

## 2025-03-19 RX ADMIN — OXYCODONE HYDROCHLORIDE 5 MILLIGRAM(S): 30 TABLET ORAL at 06:04

## 2025-03-19 RX ADMIN — Medication 975 MILLIGRAM(S): at 06:34

## 2025-03-19 RX ADMIN — ATORVASTATIN CALCIUM 40 MILLIGRAM(S): 80 TABLET, FILM COATED ORAL at 21:54

## 2025-03-19 RX ADMIN — Medication 975 MILLIGRAM(S): at 22:00

## 2025-03-19 RX ADMIN — CLOPIDOGREL BISULFATE 75 MILLIGRAM(S): 75 TABLET, FILM COATED ORAL at 12:12

## 2025-03-19 RX ADMIN — Medication 1 TABLET(S): at 12:13

## 2025-03-19 RX ADMIN — OXYCODONE HYDROCHLORIDE 5 MILLIGRAM(S): 30 TABLET ORAL at 06:36

## 2025-03-19 NOTE — PROGRESS NOTE ADULT - ASSESSMENT
65 y/o F with PMH of CHF (last TTE 1/2025 with EF 30-35%), DM, diabetic foot ulcer, PAD, CAD. Recent admission at [facility] for CHF exacerbation, presented to [facility] as a transfer for worsening R leg pain. Hospital course c/b acute on chronic CHF - TTE with EF 20%, severely decreased LV and RV function, multiple regional motion abnormalities, s/p LHC revealing TVD, pleural/pericardial effusions, JAMEL, leukocytosis suspected to be in the setting of LE wound on IV ABX, persistent RLE pain w/ RLE Arterial Duplex revealing popliteal artery occlusion  Plan for eventual angiogram with vascular when medically optimized. Pulmonary called to consult as pt with c/o SOB.

## 2025-03-19 NOTE — PROGRESS NOTE ADULT - SUBJECTIVE AND OBJECTIVE BOX
MR#[id number]  PATIENT NAME:COLE ALBA    DATE OF SERVICE: 03-19-25 @ 07:40  Patient was seen and examined by Yordy Glimore MD on    03-19-25 @ 07:40 .  Interim events noted.Consultant notes ,Labs,Telemetry reviewed by me       HOSPITAL COURSE: HPI:  64F, hx of CHF (last TTE on 1/16/25 EF 30-35%, G2DD), DM, diabetic foot ulcer with a recent admission at [facility] for CHF exacerbation presented as a transfer for worsening R. leg pain and fluid secretion, On non-invasive imaging demonstrating severe depletion of RLE blood flow beyond the popliteal vessel at knee and similar findings in L. Was transferred to [facility] for CO2 angiogram with Dr. Baltazar. (29 Jan 2025 20:05)      INTERIM EVENTS:Patient seen at bedside ,interim events noted.    02/14-Had cath Multivessel CAD started on Milrinone for CTS evaluation albeit targets not optimal  02/15-Awake on Milrinone and Bumex gtt-seen by CTS not a surgical candidate-needs Vascular work-up for LE PAD-scheduled for Angiogram on Wednesday    Weight 170Kg---> 164 Kg--->161.1 Kg---151.2 ---> 155 --> 154.7 ---> 158 --> 148--->147 >148 --> 146 --> 141---> 147  Kg    02/25-s/p RLE angiogram with pop and AT angioplasty   02/27-Awake had iHD plan for High risk PCI oLM/LAD tomorrow 1000mL removed  Had ? pAF ~~3 secs  02/28-Awake no dyspnea chest pain plan for PCI-oLM/LAD todat HD after PCI-Sinus rhythm  03/01-Had LHC PCWP-15 still elevated with low BP Plan to dialyse over weekend and plan for PCI on Monday-No dyspnea  03/02-Awake had HD removed 1500Ml no dyspnea Plan for PCI tomorrow  03/03-Awake Sinus rhythm no dyspnea FOR High risk PCI oLM/LAD   03/04-Awake had HD last night-1500mL removed For High risk PCI today  03/05-Awake s/p LHC VERA x 1 to LAD, VERA to LM , POBA to CX via RFA  03/06-Awake no dyspnea chest pain  03/07-Awake poor UOP plan to continue HD  03/08-Awake Sinus Rhythm CTPA No PE Had HD 1500mL removed  03/09 Awake no dyspnea Sinus Rhythm-Had HD 2900  mL removed noted to be Tachy -Sinus~~120's  03/10-Awake not orthopneac less Tachy~~100's  03/11-Awake Had HD 1500mL removed episode sinus bradycardia  03/12-Awake had Tunnelled HD catheter placed  03/13-Had HD 1500mL noted barrington episodes Sinus at rest   03/14-Awake Seen by EP continue BBlockers-Plan for Right AKA Discussed with Primary Vascular and Family  03/15-Had HD For Right AKA on Monday 03/16-Awake not orthopneac Sinus Rhythm For OR RIGHT AKA vs BKA tomorrow+  03/17- Somnolent scheduled for OR today no dyspnea was anxious overnight  03/18-POD#1 Right AKA    03/19-Awake alert pain controlled-no dyspnea Sinus Rhythm    PMH -reviewed admission note, no change since admission  HEART FAILURE: Acute[x ]Chronic[ ] Systolic[x ] Diastolic[ ] Combined Systolic and Diastolic[ ]  CAD[x ] CABG[ ] PCI[ ]  DEVICES[ ] PPM[ ] ICD[ ] ILR[ ]  ATRIAL FIBRILLATION[ ] Paroxysmal[ ] Permanent[ ] CHADS2-[  ]  JAMEL[x ] CKD1[ ] CKD2[ ] CKD3[ ] CKD4[x ] ESRD[ ]  COPD[ ] HTN[x ]   DM[x ] Type1[ ] Type 2[x ]   CVA[ ] Paresis[ ]    AMBULATION: Assisted[x ] Cane/walker[ ] Independent[ ]        MEDICATIONS  (STANDING):  acetaminophen     Tablet .. 975 milliGRAM(s) Oral every 8 hours  aspirin enteric coated 81 milliGRAM(s) Oral daily  atorvastatin 40 milliGRAM(s) Oral at bedtime  bisacodyl 5 milliGRAM(s) Oral every 12 hours  chlorhexidine 4% Liquid 1 Application(s) Topical <User Schedule>  clopidogrel Tablet 75 milliGRAM(s) Oral daily  furosemide    Tablet 40 milliGRAM(s) Oral daily  gabapentin 300 milliGRAM(s) Oral every 8 hours  glucagon  Injectable 1 milliGRAM(s) IntraMuscular once  insulin glargine Injectable (LANTUS) 17 Unit(s) SubCutaneous at bedtime  insulin lispro (ADMELOG) corrective regimen sliding scale   SubCutaneous three times a day before meals  insulin lispro (ADMELOG) corrective regimen sliding scale   SubCutaneous at bedtime  insulin lispro Injectable (ADMELOG) 8 Unit(s) SubCutaneous three times a day with meals  methocarbamol 750 milliGRAM(s) Oral every 6 hours  metoprolol succinate ER 25 milliGRAM(s) Oral daily  Nephro-rober 1 Tablet(s) Oral daily  pantoprazole  Injectable 40 milliGRAM(s) IV Push daily  polyethylene glycol 3350 17 Gram(s) Oral daily  senna 2 Tablet(s) Oral at bedtime    MEDICATIONS  (PRN):  ALPRAZolam 0.25 milliGRAM(s) Oral daily PRN anxiety  dextrose Oral Gel 15 Gram(s) Oral once PRN Blood Glucose LESS THAN 70 milliGRAM(s)/deciliter  HYDROmorphone  Injectable 0.2 milliGRAM(s) IV Push every 6 hours PRN severe breakthrough pain  melatonin 3 milliGRAM(s) Oral at bedtime PRN Insomnia  ondansetron Injectable 4 milliGRAM(s) IV Push every 6 hours PRN Nausea and/or Vomiting  oxyCODONE    IR 5 milliGRAM(s) Oral every 4 hours PRN Moderate Pain (4 - 6)  oxyCODONE    IR 10 milliGRAM(s) Oral every 4 hours PRN Severe Pain (7 - 10)  sodium chloride 0.65% Nasal 1 Spray(s) Both Nostrils three times a day PRN Dryness  sodium chloride 0.9% lock flush 10 milliLiter(s) IV Push every 1 hour PRN Pre/post blood products, medications, blood draw, and to maintain line patency            REVIEW OF SYSTEMS:  Constitutional: [ ] fever, [ ]weight loss,  [ x]fatigue [ ]weight gain  Eyes: [ ] visual changes  Respiratory: [ ]shortness of breath;  [ ] cough, [ ]wheezing, [ ]chills, [ ]hemoptysis  Cardiovascular: [ ] chest pain, [ ]palpitations, [ ]dizziness,  [ ]leg swelling[ ]orthopnea[ ]PND  Gastrointestinal: [ ] abdominal pain, [ ]nausea, [ ]vomiting,  [ ]diarrhea [ ]Constipation [ ]Melena  Genitourinary: [ ] dysuria, [ ] hematuria [ ][place]  Neurologic: [ ] headaches [ ] tremors[ ]weakness [ ]Paralysis Right[ ] Left[ ]  Skin: [ ] itching, [ ]burning, [ ] rashes  Endocrine: [ ] heat or cold intolerance  Musculoskeletal: [ ] joint pain or swelling; [ ] muscle, back, or extremity pain  Psychiatric: [ ] depression, [ ]anxiety, [ ]mood swings, or [ ]difficulty sleeping  Hematologic: [ ] easy bruising, [ ] bleeding gums    [ ] All remaining systems negative except as per above.   [ ]Unable to obtain.  [x] No change in ROS since admission      Vital Signs Last 24 Hrs  T(C): 36.9 (19 Mar 2025 04:24), Max: 36.9 (19 Mar 2025 04:24)  T(F): 98.4 (19 Mar 2025 04:24), Max: 98.4 (19 Mar 2025 04:24)  HR: 92 (19 Mar 2025 04:24) (87 - 95)  BP: 104/65 (19 Mar 2025 04:24) (101/65 - 104/65)  RR: 18 (19 Mar 2025 04:24) (18 - 18)  SpO2: 92% (19 Mar 2025 04:24) (92% - 96%)    Parameters below as of 19 Mar 2025 04:24  Patient On (Oxygen Delivery Method): room air      I&O's Summary    18 Mar 2025 07:01  -  19 Mar 2025 07:00  --------------------------------------------------------  IN: 200 mL / OUT: 1400 mL / NET: -1200 mL        PHYSICAL EXAM:  General: No acute distress BMI-24  HEENT: EOMI, PERRL  Neck: Supple, [ ] JVD  Lungs: Equal air entry bilaterally; [ ] rales [ ] wheezing [ ] rhonchi  Heart: Regular rate and rhythm; [x ] murmur   2/6 [ x] systolic [ ] diastolic [ ] radiation[ ] rubs [ ]  gallops  Abdomen: Nontender, bowel sounds present  Extremities: No clubbing, cyanosis, [ ] edema [x ]Right AKANervous system:  Alert & Oriented X3, no focal deficits  Psychiatric: Normal affect  Skin: No rashes or lesions    LABS:  03-19    130[L]  |  95[L]  |  28[H]  ----------------------------<  200[H]  4.3   |  23  |  0.99    Ca    8.4      19 Mar 2025 06:14  Phos  2.9     03-18  Mg     2.1     03-18      Creatinine Trend: 0.99<--, 1.09<--, 1.47<--, 1.80<--, 2.99<--, 3.61<--                        9.5    11.09 )-----------( 166      ( 19 Mar 2025 06:14 )             28.4         12 Lead ECG (03.11.25 @ 04:35) >  SINUS RHYTHM WITHMARKED SINUS ARRHYTHMIA  POSSIBLE ANTERIOR INFARCT , AGE UNDETERMINED  ABNORMAL ECG         TTE W or WO Ultrasound Enhancing Agent (03.10.25 @ 12:48) >  CONCLUSIONS:      1. Left ventricular systolic function is severely decreased with an ejection fraction of 28 % by 3D. Regional wall motion abnormalities present.   2. Multiple segmental abnormalities exist.The basal and mid anterior septum, basal and mid inferior septum, basal and mid inferior wall, and mid inferolateral segment are akinetic.  The entire apex, entire anterior wall, basal and mid anterolateral wall, and basal inferolateral segment are hypokinetic.   3. Reduced right ventricular systolic function.   4. Compared to the transthoracic echocardiogram performed on 2/19/2025, there have been no significant interval changes.   5. Right pleural effusion noted.       VA Duplex Lower Ext Vein Scan, Bilat (03.10.25 @ 13:55) >    IMPRESSION:  No evidence of deep venous thrombosis in either lower extremity.      Cardiac Catheterization (03.04.25 @ 09:07) >  Conclusions:   Impella placed for HR-PCI (pt was turned down for CABG). EF 25%     Severe proximal LCx stenosis s/p successful rotational atherectomy and balloon angioplasty.   Severe LM and proximal LAD stenosis s/p successful rotational atherectomy, balloon angioplasty, and VERA x2.

## 2025-03-19 NOTE — PROGRESS NOTE ADULT - SUBJECTIVE AND OBJECTIVE BOX
DIABETES FOLLOW UP NOTE: Saw pt earlier today    Chief Complaint: Endocrine consult requested for management of DM    INTERVAL HX:Pt stable, reports feeling better except for surgical pain at ouch or movement.  States eating 100% of her meals  with BG still >200s while on present insulin doses. No hypoglycemia. Asking for more food.         Review of Systems:  General: As above  Cardiovascular: No chest pain, palpitations  Respiratory: No SOB, no cough  GI: No nausea, vomiting, abdominal pain  Endocrine: No polyuria, polydipsia or S&Sx of hypoglycemia    Allergies    No Known Allergies    Intolerances    Augmentin (Stomach Upset; Vomiting; Nausea)    MEDICATIONS:  atorvastatin 40 milliGRAM(s) Oral at bedtime  insulin glargine Injectable (LANTUS) 17 Unit(s) SubCutaneous at bedtime  insulin lispro (ADMELOG) corrective regimen sliding scale   SubCutaneous three times a day before meals  insulin lispro (ADMELOG) corrective regimen sliding scale   SubCutaneous at bedtime  insulin lispro Injectable (ADMELOG) 8 Unit(s) SubCutaneous three times a day with meals      PHYSICAL EXAM:  VITALS: T(C): 36.7 (03-19-25 @ 11:39)  T(F): 98 (03-19-25 @ 11:39), Max: 98.4 (03-19-25 @ 04:24)  HR: 94 (03-19-25 @ 11:39) (87 - 94)  BP: 100/65 (03-19-25 @ 11:39) (100/65 - 104/65)  RR:  (18 - 18)  SpO2:  (92% - 93%)  Wt(kg): --  GENERAL: Female laying bed in NAD  Chest: R chest HD cath in place D&I  Abdomen: Soft, nontender, non distended  Extremities: Warm, no edema in all 4 exts. R AKA with ace bandage D&I  NEURO: Alert and able to answer all questions    LABS:  POCT Blood Glucose.: 230 mg/dL (03-19-25 @ 11:48)  POCT Blood Glucose.: 210 mg/dL (03-19-25 @ 08:27)  POCT Blood Glucose.: 220 mg/dL (03-18-25 @ 21:06)  POCT Blood Glucose.: 252 mg/dL (03-18-25 @ 16:34)  POCT Blood Glucose.: 223 mg/dL (03-18-25 @ 11:57)  POCT Blood Glucose.: 293 mg/dL (03-18-25 @ 08:15)  POCT Blood Glucose.: 201 mg/dL (03-17-25 @ 21:31)  POCT Blood Glucose.: 205 mg/dL (03-17-25 @ 17:46)  POCT Blood Glucose.: 200 mg/dL (03-17-25 @ 14:38)  POCT Blood Glucose.: 243 mg/dL (03-16-25 @ 21:14)  POCT Blood Glucose.: 259 mg/dL (03-16-25 @ 17:12)  POCT Blood Glucose.: 266 mg/dL (03-16-25 @ 15:25)                            9.5    11.09 )-----------( 166      ( 19 Mar 2025 06:14 )             28.4       03-19    130[L]  |  95[L]  |  28[H]  ----------------------------<  200[H]  4.3   |  23  |  0.99    eGFR: 64    Ca    8.4      03-19  Mg     2.1     03-18  Phos  2.9     03-18        A1C with Estimated Average Glucose Result: 11.6 % (01-24-25 @ 05:40)      Estimated Average Glucose: 286 mg/dL (01-24-25 @ 05:40)        01-24 Chol 122 Direct LDL -- LDL calculated 66 HDL 39[L] Trig 89

## 2025-03-19 NOTE — PROGRESS NOTE ADULT - ASSESSMENT
65 YO F with PMHx of HFrEF 30-35 and grade 2 ddfxn (last TTE on 01/16/25), DM2, and diabetic foot ulcer. Recent admission at [facility] for ADHF. Patient now represents to OSH and ultimately to [facility] as a transfer for worsening right leg pain and fluid secretion. As per documentation, patient was reported to have severe depletion of RLE blood flow beyond the popliteal vessel at knee and similar findings in the left. Patient was transferred to [facility] for CO2 angiogram with Dr. Baltazar. Of note, patient also with reported visual disturbance for which patient was seen and evaluated by opthalmology. Internal Medicine has been consulted on Ms. Kirby's care for medical management.       # Foot ulcers with worsening RLE pain second to pop artery occlusion +/- diabetic ulcers   - RLE Arterial Duplex with popliteal artery occlusion   - LLE Arterial Duplex with underlying disease cannot be excluded   - s/p angio with pop and AT VERA on 2/24   - c/b necrosis of RLE   - S/P AKA 3/17   - Continue on Lipitor and DAPT  - Continue pain control with oxy  - Wound care called for dressing recs   - pain management       # ADHF/ BiV HFrEF 20   - Recent admission at [facility] for ADHF and on dobutamine outpatient  - TTE 1/16 with EF 30-35 with moderately reduced LVSF and grade 2 ddfxn, normal RVSF , trace TR/ KS, and trace pericardial effusion  - RPT TTE with EF 20, severely decreased LV, decreased RVSF with TAPSE 1.2, and regional wall motion abnormalities present with entire septum, entire apex, entire inferior wall, mid inferolateral segment, and mid anterolateral segment are hypokinetic.   - RHC with RA 20, PA 49/29 (40), PCWP 35, mVO2 51.1, CO/CI 3.8/2.2, SVR 1095 w/ Multivessel CAD   - s/p zaroxyln 10 QD to augment diuresis   - s/p bumex GTT   - s/p HTS to augment diuresis   - RPT limited TTE w/ LVSF severely decreased, reduced RVSF, LVOT VTI 12, mild to mod TR, and mildly elevated right atrial pressure  - s/p bumex 4 IV QD, but anuric and now dc'ed   - Case discussed with EP and needs to be on GDMT for 3 months before AICD placement and should be stabilized off of inotropic support.    - RPT ECHO post cath with EF 28 %, regional WMA, multiple segmental abnormalities exist, and reduced RVSF    - Case discussed with HF and cards and monitoring off milrinone GTT   - Continue on toprol 25 and hydral on nonHD days. EP cleared for BB  - Continue volume removal with HD   - Monitor I and O   - Monitor volume status   - Check daily weights    - Monitor on telemetry  - Monitor electrolytes   - Cardio, HF, Renal, and EP evals appreciated; F/u recs     # Bradycardia   - SB to 48 BPM at 0400 while asleep on 3/11 and likely related to BB , however last dose prior to event was 3/10 at 0600.  - Monitor telemetry  - EP eval appreciated; F/u recs --> Cleared for BB     # Tachycardia and Hypoxia  - Tachycardiac and on RA with SPO2 running 90-92   - Could be second to low cardiac output   - CTA with no PE  - Duplex negative for DVT   - On Toprol XL 25 PO QD.   - Monitor HR   - Monitor closely on Tele  - Cardio eval appreciated; F/u recs    # JAMEL with Hyponatremia and Metabolic Acidosis   - Baseline CRE 0.7-1.2 and increased to ~4  - As per OSH documentation, JAMEL was thought to be second to Unasyn/ Bumex / Entresto use and Hypotension   - US RENAL with no hydronephrosis  - Likely cardiorenal induced with low flow state in ADHF, however now rising again and concern for milrinone vs overdiuresis (prerenal) vs cardiorenal.   - Milrinone adjusted and diuresis turned off, however no improvement/ worsened in renal function noted.   - s/p shiley placement and started on HD 2/20  - s/p permacath 3/11  - Continue on HD per renal  - Avoid nephrotoxic agents  - Monitor Cr and daily BMP   - Renal and HF evals appreciated; F/u recs    # CAD with TVD   - NST abnormal with small-sized, moderate defect in apical wall that is predominantly fixed suggestive of an infarction with minimal marcus-infarct ischemia. Post stress LVEF 25.  - s/p LHC with RCA , severe ostial LM disease, diffuse disease in LAD and LCx, some L to R collaterals (Multivessel CAD)  - Case discussed with CTSx and NOT a candidate for CABG due to comorbidities  - Cardiac MRI with greater than 75% subendocardial scarring of the basal to mid-inferior wall of the LV and 50% scarring of the left ventricular basal inferoseptal wall. There is also left ventricular transmural scarring involving the apical septal wall and likely near transmural scarring of the apical lateral wall.  - s/p RPT LHC 3/4 with LM 80 s/p POBA/ VERA with LM 1, pLAD 90 s/p POBA/ VERA with pLAD 1, and Cx 80 s/p POBA with Cx 10.   - DAPT and Statin per cardiology   - IC, Cards and HF following     # BL LE Edema likely in setting of ADHF  - Remove volume as per Cardio, HF and Renal   - BL LE elevation and compression  - LE Duplex neg   - Monitor for now    # Pericardial effusion likely in setting ADHF  - TTE with trace pericardial effusion   - CT Chest with small pericardial effusion   - Denies chest pain, palpitations, chest tightness or discomfort, shortness of breath or dyspnea   - Repeat serial TTE for further evaluation   - Diuresis per cardio/ renal.  - Monitor on telemetry  - Cardio following    # Pleural effusion likely in setting ADHF  - CT Chest with small BL pleural effusions  - Monitor O2 saturation  - Supplement to maintain > 90%   - Diuresis / HD per cardio/ renal.     # Hypernatremia to hyponatremia  - Hypernatremia likely from HTS vs over diuresis/ intravascular dehydration. HTS and diuresis stopped with improved sodium   - Hyponatremia now noted second to volume overload   - Avoid overcorrection > 6-8 mEq in 24 hours  - Monitor sodium with HD    # Transaminitis  - Likely 2/2 hepatic congestion   - Volume removal with HD as tolerated  - Trend LFTs, if uptrend post-HD initiation, check ABD US  - Serial ABD Examinations    # Leukocytosis likely in setting of BL LE ulcers with pop occlusion   - BCx negative   - UCx with probable contamination   - S/P unasyn for ABX.   - Leukocytosis fluctuating, however appears nontoxic with no fever spikes and monitoring closely off ABX  - If febrile check pan cultures   - Trend CBC, temp curve, VS and adjust as tolerated      # Visual Disturbance  - CT Head w/ old infarcts  - On ASA and Statin   - Opthalmology eval appreciated    # Indigestion and Constipation   - PPI, miralax and senna continued  - Monitor BMs    # Anemia likely mixed AOCD vs iron deficiency   - HH stable in 9s on HSQ  - Anemia panel with AOCD   - Started on venofer, but ferritin high and now stopped   - Continue on EPO with HD per renal  - Monitor HH   - Transfuse for Hgb < 8  - Maintain active T/S    # Diabetes Mellitus A1C 11.6   - Continue on lantus 13 with lispro 5 and ISS   - Diabetic DASH diet   - Monitor and adjust glucose levels PRN   - Endocrine following; F/u recs     # HLD and HLD  - Continue on lipitor and toprol  - Monitor BP    # DISPO TBD  - Palliative care called and patient remains FULL CODE

## 2025-03-19 NOTE — PROGRESS NOTE ADULT - PROBLEM SELECTOR PLAN 1
- Check BG TID AC and HS while on PO diet  - Adjust Lantus to 18u QHS   -  Adjust Admelog to 10 units ac meals TID AC (HOLD if NPO) slow titration due to ESRD  - C/w low dose Admelog correctional scales TID AC and HS  - Will follow and adjust doses as needed  Discharge Planning:   - Likely basal/bolus regimen given kidney function (doses TBD closer to d/c). Not a candidate for SGLT2 due to leg ulcers and also significantly decreased EGFR., can consider GLP1 in addition to Basal/bolus> will need close f/u with Optho due to h/o retinopathy.   Can send Rx for ozempic 0.25mg weekly x 4weeks and increase to 0.50mg, or mounjaro 2.5mg 2.5mg x4 weeks, then increase to 5.0mg weekly. (Patient denies medullary thyroid cancer, hx of pancreatitis or MEN2)  - Please make sure patient has DM management supplies (glucometer, lancets, strips, alcohol pads, insulin pen needles).  - Patient should check BGs before meals and at bedtime. Contact PCP/endocrinology if BG is less than 70 X1, greater than  400 X1 or persistently greater than 200s.   - Endocrine follow up: can f/u with Dr. Grace, Endocrinology.   - Needs Optho/ renal/cardiac /podiatry and vascular follow up

## 2025-03-19 NOTE — PROGRESS NOTE ADULT - SUBJECTIVE AND OBJECTIVE BOX
Follow-up Pulm Progress Note    No new respiratory events overnight.  Denies SOB/CP.     Medications:  MEDICATIONS  (STANDING):  acetaminophen     Tablet .. 975 milliGRAM(s) Oral every 8 hours  aspirin enteric coated 81 milliGRAM(s) Oral daily  atorvastatin 40 milliGRAM(s) Oral at bedtime  bisacodyl 5 milliGRAM(s) Oral every 12 hours  chlorhexidine 4% Liquid 1 Application(s) Topical <User Schedule>  clopidogrel Tablet 75 milliGRAM(s) Oral daily  dextrose 5%. 1000 milliLiter(s) (100 mL/Hr) IV Continuous <Continuous>  dextrose 5%. 1000 milliLiter(s) (50 mL/Hr) IV Continuous <Continuous>  dextrose 50% Injectable 25 Gram(s) IV Push once  dextrose 50% Injectable 12.5 Gram(s) IV Push once  dextrose 50% Injectable 25 Gram(s) IV Push once  furosemide    Tablet 40 milliGRAM(s) Oral daily  gabapentin 300 milliGRAM(s) Oral every 8 hours  glucagon  Injectable 1 milliGRAM(s) IntraMuscular once  insulin glargine Injectable (LANTUS) 17 Unit(s) SubCutaneous at bedtime  insulin lispro (ADMELOG) corrective regimen sliding scale   SubCutaneous three times a day before meals  insulin lispro (ADMELOG) corrective regimen sliding scale   SubCutaneous at bedtime  insulin lispro Injectable (ADMELOG) 8 Unit(s) SubCutaneous three times a day with meals  methocarbamol 750 milliGRAM(s) Oral every 6 hours  metoprolol succinate ER 25 milliGRAM(s) Oral daily  Nephro-rober 1 Tablet(s) Oral daily  pantoprazole  Injectable 40 milliGRAM(s) IV Push daily  polyethylene glycol 3350 17 Gram(s) Oral daily  senna 2 Tablet(s) Oral at bedtime    MEDICATIONS  (PRN):  ALPRAZolam 0.25 milliGRAM(s) Oral daily PRN anxiety  dextrose Oral Gel 15 Gram(s) Oral once PRN Blood Glucose LESS THAN 70 milliGRAM(s)/deciliter  HYDROmorphone  Injectable 0.2 milliGRAM(s) IV Push every 6 hours PRN severe breakthrough pain  melatonin 3 milliGRAM(s) Oral at bedtime PRN Insomnia  ondansetron Injectable 4 milliGRAM(s) IV Push every 6 hours PRN Nausea and/or Vomiting  oxyCODONE    IR 5 milliGRAM(s) Oral every 4 hours PRN Moderate Pain (4 - 6)  oxyCODONE    IR 10 milliGRAM(s) Oral every 4 hours PRN Severe Pain (7 - 10)  sodium chloride 0.65% Nasal 1 Spray(s) Both Nostrils three times a day PRN Dryness  sodium chloride 0.9% lock flush 10 milliLiter(s) IV Push every 1 hour PRN Pre/post blood products, medications, blood draw, and to maintain line patency          Vital Signs Last 24 Hrs  T(C): 36.6 (19 Mar 2025 08:14), Max: 36.9 (19 Mar 2025 04:24)  T(F): 97.9 (19 Mar 2025 08:14), Max: 98.4 (19 Mar 2025 04:24)  HR: 92 (19 Mar 2025 08:14) (87 - 92)  BP: 103/65 (19 Mar 2025 08:14) (101/65 - 104/65)  BP(mean): --  RR: 18 (19 Mar 2025 08:14) (18 - 18)  SpO2: 93% (19 Mar 2025 08:14) (92% - 96%)    Parameters below as of 19 Mar 2025 08:14  Patient On (Oxygen Delivery Method): room air              03-18 @ 07:01  -  03-19 @ 07:00  --------------------------------------------------------  IN: 200 mL / OUT: 1400 mL / NET: -1200 mL          LABS:                        9.5    11.09 )-----------( 166      ( 19 Mar 2025 06:14 )             28.4     03-19    130[L]  |  95[L]  |  28[H]  ----------------------------<  200[H]  4.3   |  23  |  0.99    Ca    8.4      19 Mar 2025 06:14  Phos  2.9     03-18  Mg     2.1     03-18            CAPILLARY BLOOD GLUCOSE      POCT Blood Glucose.: 210 mg/dL (19 Mar 2025 08:27)      Urinalysis Basic - ( 19 Mar 2025 06:14 )    Color: x / Appearance: x / SG: x / pH: x  Gluc: 200 mg/dL / Ketone: x  / Bili: x / Urobili: x   Blood: x / Protein: x / Nitrite: x   Leuk Esterase: x / RBC: x / WBC x   Sq Epi: x / Non Sq Epi: x / Bacteria: x      Physical Examination:  PULM: Clear to auscultation bilaterally, no significant sputum production  CVS: S1, S2 heard    RADIOLOGY REVIEWED    CT chest: ct< from: CT Angio Chest PE Protocol w/ IV Cont (03.07.25 @ 11:32) >    ACC: [id number] EXAM:  CT ANGIO CHEST PULM ART WAWIC   ORDERED BY: JORGE ALBERTO NEWMAN     PROCEDURE DATE:  03/07/2025        INTERPRETATION:  CLINICAL INFORMATION: Shortness of breath, evaluate for   pulmonary embolism.    COMPARISON: CT chest 1/25/2025    CONTRAST/COMPLICATIONS:  IV Contrast: Omnipaque 350  70 cc administered   30 cc discarded  Oral Contrast: NONE  .    PROCEDURE:  CT Angiography of the Chest.  Sagittal and coronal reformats were performed as well as 3D (MIP)   reconstructions.    FINDINGS:    LUNGS AND LARGE AIRWAYS: Minimal secretions within the left main   bronchus. Peripheral predominant groundglass opacities in all lobes.   Partial atelectasis of the bilateral lower lobes.  PLEURA: Small left and moderate right pleuraleffusions.  VESSELS: No pulmonary embolus is noted. Aortic calcifications. Coronary   artery calcifications. Right IJ central venous catheter with tip   terminating in the distal SVC.  HEART: Cardiomegaly. Trace pericardial effusion.  MEDIASTINUM ANDHILA: No lymphadenopathy.  CHEST WALL AND LOWER NECK: Within normal limits.  VISUALIZED UPPER ABDOMEN: Vascular calcifications.  BONES: Within normal limits.    IMPRESSION:  *  No pulmonary embolism.  *  Moderate right and small left pleural effusions.  *  Nonspecific peripheral groundglass opacities in the bilateral lungs,   slightly more prominent when compared to prior examination on 1/25/2025.      < end of copied text >

## 2025-03-19 NOTE — CHART NOTE - NSCHARTNOTEFT_GEN_A_CORE
64F Hx of CHF, DM, diabetic foot ulcer with a recent admission at [facility] for CHF exacerbation.    Pt admitted 48 days ago for right lower limb ischemia, pain and ulcers.   - RLE Arterial Duplex with popliteal artery occlusion   - s/p angio with pop and AT VERA on 2/24   - c/b necrosis of RLE   - 3/17 s/p right AKA  Hospital course includes Bradycardia, JAMEL, hyponatremia, Hemodialysis    Current out- patient pain regimen: None  Out Patient Pain Management provider: None    Pt w/ acute, post-op, right AKA pain. Denies phantom limb pain.   EMR reviewed, pain score decreases to 0/10, only required one dose Oxy IR 5 mg in 24 hrs.    Recommend:  Discontinue IV Dilaudid  Continue Gabapentin 300 mg Q8h (CrCl = 65.5)  Continue Robaxin 750 mg Q6h  Continue Tylenol 975 mg Q8h   Continue Oxycodone 5 mg Q4h PRN severe pain    Warm/cool packs for comfort  Bowel Regimen  Incentive Spirometer  PT per primary team  Monitor for sedation, respiratory depression  Out-patient pain practice list can be provided for pain management after discharge  Narcan Rescue Kit on discharge (Naloxone 4 mg/0.1 ml nasal spray - 1 spray q 2-3 minutes alternating between nostrils)    Signing off    [facility]  [phone]

## 2025-03-19 NOTE — PROGRESS NOTE ADULT - SUBJECTIVE AND OBJECTIVE BOX
OVERNIGHT EVENTS: NAEO    SUBJECTIVE: Pt seen and examined at bedside.     MEDICATIONS  (STANDING):  acetaminophen     Tablet .. 975 milliGRAM(s) Oral every 8 hours  aspirin enteric coated 81 milliGRAM(s) Oral daily  atorvastatin 40 milliGRAM(s) Oral at bedtime  bisacodyl 5 milliGRAM(s) Oral every 12 hours  chlorhexidine 4% Liquid 1 Application(s) Topical <User Schedule>  clopidogrel Tablet 75 milliGRAM(s) Oral daily  dextrose 5%. 1000 milliLiter(s) (100 mL/Hr) IV Continuous <Continuous>  dextrose 5%. 1000 milliLiter(s) (50 mL/Hr) IV Continuous <Continuous>  dextrose 50% Injectable 25 Gram(s) IV Push once  dextrose 50% Injectable 12.5 Gram(s) IV Push once  dextrose 50% Injectable 25 Gram(s) IV Push once  furosemide    Tablet 40 milliGRAM(s) Oral daily  gabapentin 300 milliGRAM(s) Oral every 8 hours  glucagon  Injectable 1 milliGRAM(s) IntraMuscular once  insulin glargine Injectable (LANTUS) 17 Unit(s) SubCutaneous at bedtime  insulin lispro (ADMELOG) corrective regimen sliding scale   SubCutaneous three times a day before meals  insulin lispro (ADMELOG) corrective regimen sliding scale   SubCutaneous at bedtime  insulin lispro Injectable (ADMELOG) 8 Unit(s) SubCutaneous three times a day with meals  methocarbamol 750 milliGRAM(s) Oral every 6 hours  metoprolol succinate ER 25 milliGRAM(s) Oral daily  Nephro-rober 1 Tablet(s) Oral daily  pantoprazole  Injectable 40 milliGRAM(s) IV Push daily  polyethylene glycol 3350 17 Gram(s) Oral daily  senna 2 Tablet(s) Oral at bedtime    MEDICATIONS  (PRN):  ALPRAZolam 0.25 milliGRAM(s) Oral daily PRN anxiety  dextrose Oral Gel 15 Gram(s) Oral once PRN Blood Glucose LESS THAN 70 milliGRAM(s)/deciliter  HYDROmorphone  Injectable 0.2 milliGRAM(s) IV Push every 6 hours PRN severe breakthrough pain  melatonin 3 milliGRAM(s) Oral at bedtime PRN Insomnia  ondansetron Injectable 4 milliGRAM(s) IV Push every 6 hours PRN Nausea and/or Vomiting  oxyCODONE    IR 5 milliGRAM(s) Oral every 4 hours PRN Moderate Pain (4 - 6)  oxyCODONE    IR 10 milliGRAM(s) Oral every 4 hours PRN Severe Pain (7 - 10)  sodium chloride 0.65% Nasal 1 Spray(s) Both Nostrils three times a day PRN Dryness  sodium chloride 0.9% lock flush 10 milliLiter(s) IV Push every 1 hour PRN Pre/post blood products, medications, blood draw, and to maintain line patency    T(C): 36.6 (03-19-25 @ 08:14), Max: 36.9 (03-19-25 @ 04:24)  HR: 92 (03-19-25 @ 08:14) (87 - 92)  BP: 103/65 (03-19-25 @ 08:14) (101/65 - 104/65)  RR: 18 (03-19-25 @ 08:14) (18 - 18)  SpO2: 93% (03-19-25 @ 08:14) (92% - 96%)    03-18-25 @ 07:01  -  03-19-25 @ 07:00  --------------------------------------------------------  IN: 200 mL / OUT: 1400 mL / NET: -1200 mL      LABS:                        9.5    11.09 )-----------( 166      ( 19 Mar 2025 06:14 )             28.4     03-19    130[L]  |  95[L]  |  28[H]  ----------------------------<  200[H]  4.3   |  23  |  0.99    Ca    8.4      19 Mar 2025 06:14  Phos  2.9     03-18  Mg     2.1     03-18        Urinalysis Basic - ( 19 Mar 2025 06:14 )    Color: x / Appearance: x / SG: x / pH: x  Gluc: 200 mg/dL / Ketone: x  / Bili: x / Urobili: x   Blood: x / Protein: x / Nitrite: x   Leuk Esterase: x / RBC: x / WBC x   Sq Epi: x / Non Sq Epi: x / Bacteria: x      PE:  Gen: NAD  CV: Pulse regular present  Resp: Nonlabored  Abdomen: Soft, nontender  Extremities: R AKA dressing c/d/i, R brach site soft and c/d/i

## 2025-03-19 NOTE — PROGRESS NOTE ADULT - SUBJECTIVE AND OBJECTIVE BOX
[facility] NEPHROLOGY- PROGRESS NOTE    64y Female with history of CHF presents as a transfer for LE CO2 angiogram. Nephrology consulted for elevated Scr.    Patient s/p R AKA on 3/17.     REVIEW OF SYSTEMS:  Gen: no fevers  Cards: no chest pain  Resp: no dyspnea  GI: no nausea or vomiting or diarrhea  Vascular: no LE edema    Augmentin (Stomach Upset; Vomiting; Nausea)  No Known Allergies      Hospital Medications: Medications reviewed        VITALS:  T(F): 98.2 (03-19-25 @ 17:04), Max: 98.4 (03-19-25 @ 04:24)  HR: 98 (03-19-25 @ 17:04)  BP: 91/55 (03-19-25 @ 17:04)  RR: 18 (03-19-25 @ 17:04)  SpO2: 91% (03-19-25 @ 17:04)  Wt(kg): --    03-18 @ 07:01  -  03-19 @ 07:00  --------------------------------------------------------  IN: 200 mL / OUT: 1400 mL / NET: -1200 mL    03-19 @ 07:01  -  03-19 @ 18:03  --------------------------------------------------------  IN: 360 mL / OUT: 750 mL / NET: -390 mL        PHYSICAL EXAM:  Gen: NAD, calm  Cards: RRR, +S1/S2, no M/G/R  Resp: bibasilar rales  GI: soft, NT/ND, NABS  : + stiles  Vascular: R AKA,  + RIJ TDC intact        LABS:  03-19    130[L]  |  95[L]  |  28[H]  ----------------------------<  200[H]  4.3   |  23  |  0.99    Ca    8.4      19 Mar 2025 06:14  Phos  2.9     03-18  Mg     2.1     03-18      Creatinine Trend: 0.99 <--, 1.09 <--, 1.47 <--, 1.80 <--, 2.99 <--                        9.5    11.09 )-----------( 166      ( 19 Mar 2025 06:14 )             28.4     Urine Studies:  Urinalysis Basic - ( 19 Mar 2025 06:14 )    Color:  / Appearance:  / SG:  / pH:   Gluc: 200 mg/dL / Ketone:   / Bili:  / Urobili:    Blood:  / Protein:  / Nitrite:    Leuk Esterase:  / RBC:  / WBC    Sq Epi:  / Non Sq Epi:  / Bacteria:

## 2025-03-19 NOTE — PROGRESS NOTE ADULT - ASSESSMENT
64F, hx of CHF (last TTE on 1/16/25 EF 30-35%, G2DD), DM, diabetic foot ulcer with a recent admission at [facility] include [facility] beds at the [facility] for CHF exacerbation 1/14-1/17 p/w worsening R. leg pain and fluid secretion, was meeting sepsis criteria with podiatry having low suspicion for right cellulitis and admitted for continued management of HF exacerbation and needing ischemic eval.     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  -Left leg Arterial Duplex: Slightly tardus parvus waveform of the left popliteal artery is noted, for which underlying disease cannot be excluded. Posterior tibial artery waveform is nonpulsatile. Anterior tibial artery tardus parvus flow is noted.   Transferred to [facility] for CO2 angiogram as per vascular surgery    #  PAD Wound of lower extremity.   ·  Plan: Podiatry following, b/l serous bullae, no concerns for infection  Found to have RLE coolness  -Right Leg Arterial Duplex: Popliteal artery is occluded with negligible flow of right trifurcation arteries.  -Left leg Arterial Duplex: Slightly tardus parvus waveform of the left popliteal artery is noted, for which underlying disease cannot be excluded. Posterior tibial artery waveform is nonpulsatile. Anterior tibial artery tardus parvus flow is noted.  -Started on heparin gtt and dobutamine gtt -Will transfer to [facility] for CO2 angiogram as per vascular surgery as not candidate for CTA due to worsening SCr  -Keep compression dressing  -LE elevation above heart level at rest.  -Transferred to [facility] for CO2 angiogram   - Overall this patient is at   intermediate  risk (for cardiac death, nonfatal myocardial infarction, and nonfatal cardiac arrest perioperatively for this intermediate  risk procedure).   No cardiac contraindications for CO2 vangiogram  There  are  no further recommendation for risk stratifying imaging/stress testing prior to planned surgery  Seen by Podiatry-No acute pod intervention, local wound care only-   PAD with rest ischemia  s/p AT/Popliteal angioplasty on DAPT  - RLE extensive gangrnous changes to foot dorsum, ankle, heel dorsomedial and lateral lower leg, ischemic changes to 2nd digit and hallux,  Left foot hallux distal tuft well adhered eschar, no acute signs of infection.   -Seen by vascular Plan for AKA vs BKA   Vascular planning for Right AKA Discussed with Vascular she is as in optimal cardiac condition as possible to undergo surgery under [facility]  Her HF is compensated and has had PCI  - Overall this patient is at   intermediate to high but not prohobitive risk (for cardiac death, nonfatal myocardial infarction, and nonfatal cardiac arrest perioperatively for this intermediate risk procedure).     The patient is however without evidence of ACS, Decompensated Heart Failure,Obstructive Valvular Heart disease or Unstable arrhythmia.   There  are  no further recommendation for risk stratifying imaging/stress testing prior to planned surgery  She is in as optimal condition as possible for the procedure      # HFrEF (congestive heart failure). Ischemic Cardiomyopathy  ·  Plan: Hx of HF, previous admission in January, on home Lasix 40 qD, Metoprolol Succ 25 qD. Not on Entresto due to insurance issues  Last TTE: LVSF moderately decreased w/ EF 30-35 %. Moderate G2DD. Mild MR  Stress test: small-sized, moderate defect(s) in the apical wall that is predominantly fixed suggestive of an infarction with minimal marcus-infarct ischemia  Ischemic cardiomyopathy  GDMT on hold 2/2 JAMEL  Repeat TTE EF 20% with WMA RAP~~8  Repeat Limited TTE  Taper off Milrinone and start GDMT  Is currently off Hydral/Isdn and Bumex 2/2 soft BP  Continue HD with UF had 2900mL removed yesterday will stop Milrinone and observe  Started  low dose BBlocker Metoprolol tartate 12.5 mg PO q8  03/10-Off Midodrine will reattempt Hyd/Isdn  03/11-Daily HD/UF   03/12-Awake s/p Tunelled HD catheter-Plan for Discharge to [facility]  03/13-EP evaluation Arias episodes  03/14-Increasing necrosis noted Right LE Vascular planning for Right AKA   03/15-Right AKAvs BKA on Monday 03/16- Awake Sinus Rhythm clinically stable for OR tomorrow  03/17-Somnolent not dyspneac for OR today  03/19-POD#2  R AKA No dyspnea BP stable       # CAD  LHC-RCA , severe ostial LM disease, diffuse disease in LAD and LCx, some L to R collaterals-seen by CTS advise cMR for viability poor target vessels for CABG-?High risk LM/LAD PCI  Had cMR-LVEF is 25%, greater than 75% subendocardial scarring involving the basal to mid inferior wall of the left ventricle, left ventricular transmural scarring involving the apical septal wall and likely near transmural scarring of the apical lateral wall. 50% scarring of the left ventricular basal inferoseptal wall.  03/05-s/p PCI-LM/LAD   Continue  uninterrupted DAPT        # JAMEL on CKD   Creatinine Trend: 0.99<--, 1.09<--1.47 <--1.80<--2.99 <--3.61 <--3.08<-- 4.40 <--3.84<--, 3.05<--, 4.46<--, 3.63<--, 2.35<--, 2.36<---3.38<--(HD)-- 4.76<--4.07<---3.90<-- 1.17  Has had 3 sessions HD for interrmittent HD to allow contrast based procedures is non oliguric  Plan to continue HD likely will need extended RRT  Srini change on 03/11  Permacath  Been off dialysis since 03/17  Renal function improved

## 2025-03-19 NOTE — ADVANCED PRACTICE NURSE CONSULT - REASON FOR CONSULT
Wound care consult initiated by RN to assess patient's skin for a possible    Reason for Admission: Transfer for CO2 angiogram; PAD and CLTI  History of Present Illness:   64F, hx of CHF (last TTE on 1/16/25 EF 30-35%, G2DD), DM, diabetic foot ulcer with a recent admission at [facility] for CHF exacerbation presented as a transfer for worsening R. leg pain and fluid secretion, On non-invasive imaging demonstrating severe depletion of RLE blood flow beyond the popliteal vessel at knee and similar findings in L. Was transferred to [facility] for CO2 angiogram with Dr. Baltazar.     Wound care consult initiated by RN to assess patient's skin for a possible stage 2 pressure injury     Reason for Admission: Transfer for CO2 angiogram; PAD and CLTI  History of Present Illness:   64F, hx of CHF (last TTE on 1/16/25 EF 30-35%, G2DD), DM, diabetic foot ulcer with a recent admission at [facility] for CHF exacerbation presented as a transfer for worsening R. leg pain and fluid secretion, On non-invasive imaging demonstrating severe depletion of RLE blood flow beyond the popliteal vessel at knee and similar findings in L. Was transferred to [facility] for CO2 angiogram with Dr. Baltazar.

## 2025-03-19 NOTE — PROGRESS NOTE ADULT - ASSESSMENT
64y.o. Female with PAD, CLTI affecting b/l LE, CHF, chronic b/l LE wound R worsen then the L, with R foot blistering and ulceration was transfer to [facility] for CO2 angiogram. Patient is currently followed by medicine and cardiology now s/p RLE angiogram with pop and AT angioplasty on 2/24/25 now s/p R AKA 3/17.     PLAN:  - Dressing off POD #3 3/20  - Diet: Regular   - Analgesia and antiemetics as needed  - Strict I&O's   - Incentive spirometry    Vascular [id number]

## 2025-03-19 NOTE — PROGRESS NOTE ADULT - ASSESSMENT
64y Female with history of CHF presents as a transfer for LE CO2 angiogram. Nephrology consulted for elevated Scr.    1) JAMEL: likely due to ATN for which patient initiated on RRT on 2/20. Last HD on 3/14. JAMEL resolved. No need for additional HD at this time. S/P TDC placement on 3/11. Will plan to remove TDC this week. S/P stiles for urinary retention. Avoid nephrotoxins.    2) HTN: BP low normal. Start low dose lisinopril given h/o CHF.    3) LE edema: Continue with lasix 40 mg PO daily. TTE with severely decreased LVSF. Monitor UO.     4) Anemia: Hb acceptable with low TSAT. No IV iron given elevated ferritin. S/P Epo 8K X 1 dose 3/12. Monitor Hb.    5) Hyponatremia: In the setting of hyperglycemia and volume overload. Continue with 1L [facility] NEPHROLOGY  Raji Waterman M.D.  Mars Feliz D.O.  Kelley Parks M.D.  MD Nancy Kulkarni, MSN, ANP-C    Telephone: [phone]  Facsimile: [phone] [id number], [address]  [place] [address]

## 2025-03-19 NOTE — ADVANCED PRACTICE NURSE CONSULT - RECOMMEDATIONS
Impression:    B/L heel hyperpigmentation, cannot rule out a deep tissue injury present on admission     Recommendations:    1) turn and position q2 and PRN utilizing offloading assistive devices  2) routine pericare daily and PRN soiling  3) encourage optimal nutrition  4) waffle cushion when oob to chair  5) B/L LE complete cair air fluidized boots or abdulaziz-lock pillow to offload heels/feet  6) triad protective barrier cream to B/L buttocks/sacrum daily and PRN soiling  7) incontinence management - consider external urinary catheter to divert urine from skin if incontinent  8) sween 24 moisturizer to dry skin daily     Plan discussed with RN     For questions/comments regarding the recommendations in this consult, please contact Nevaeh Alford via [employer] Teams. Wound care will not actively follow. For new concerns, please enter new consult. Thank you!     Impression:    B/L buttocks possible unstageable pressure injury      Recommendations:    1) turn and position q2 and PRN utilizing offloading assistive devices  2) routine pericare daily and PRN soiling  3) encourage optimal nutrition  4) waffle cushion when oob to chair  5) B/L LE complete cair air fluidized boots or abdulaziz-lock pillow to offload heels/feet  6) triad protective barrier cream to B/L buttocks/sacrum daily and PRN soiling  7) incontinence management - consider external urinary catheter to divert urine from skin if incontinent  8) sween 24 moisturizer to dry skin daily     Plan discussed with NEVAEH Cruz at bedside      For questions/comments regarding the recommendations in this consult, please contact Nevaeh Alford via [employer] Teams. Wound care will not actively follow. For new concerns, please enter new consult. Thank you!

## 2025-03-19 NOTE — PROGRESS NOTE ADULT - SUBJECTIVE AND OBJECTIVE BOX
Name of Patient : TOMASZ ALBA  MRN: [id number]        Subjective: Patient seen and examined. No new events except as noted.   pain better  off HD     REVIEW OF SYSTEMS:    CONSTITUTIONAL: No weakness, fevers or chills  EYES/ENT: No visual changes;  No vertigo or throat pain   NECK: No pain or stiffness  RESPIRATORY: No cough, wheezing, hemoptysis; No shortness of breath  CARDIOVASCULAR: No chest pain or palpitations  GASTROINTESTINAL: No abdominal or epigastric pain. No nausea, vomiting, or hematemesis; No diarrhea or constipation. No melena or hematochezia.  GENITOURINARY: No dysuria, frequency or hematuria  NEUROLOGICAL: No numbness or weakness  SKIN: No itching, burning, rashes, or lesions   All other review of systems is negative unless indicated above.    MEDICATIONS:  MEDICATIONS  (STANDING):  acetaminophen     Tablet .. 975 milliGRAM(s) Oral every 8 hours  aspirin enteric coated 81 milliGRAM(s) Oral daily  atorvastatin 40 milliGRAM(s) Oral at bedtime  bisacodyl 5 milliGRAM(s) Oral every 12 hours  chlorhexidine 4% Liquid 1 Application(s) Topical <User Schedule>  clopidogrel Tablet 75 milliGRAM(s) Oral daily  dextrose 5%. 1000 milliLiter(s) (100 mL/Hr) IV Continuous <Continuous>  dextrose 5%. 1000 milliLiter(s) (50 mL/Hr) IV Continuous <Continuous>  dextrose 50% Injectable 25 Gram(s) IV Push once  dextrose 50% Injectable 12.5 Gram(s) IV Push once  dextrose 50% Injectable 25 Gram(s) IV Push once  furosemide    Tablet 40 milliGRAM(s) Oral daily  gabapentin 300 milliGRAM(s) Oral every 8 hours  glucagon  Injectable 1 milliGRAM(s) IntraMuscular once  insulin glargine Injectable (LANTUS) 18 Unit(s) SubCutaneous at bedtime  insulin lispro (ADMELOG) corrective regimen sliding scale   SubCutaneous three times a day before meals  insulin lispro (ADMELOG) corrective regimen sliding scale   SubCutaneous at bedtime  insulin lispro Injectable (ADMELOG) 10 Unit(s) SubCutaneous three times a day with meals  lisinopril 2.5 milliGRAM(s) Oral at bedtime  methocarbamol 750 milliGRAM(s) Oral every 6 hours  metoprolol succinate ER 25 milliGRAM(s) Oral daily  Nephro-rober 1 Tablet(s) Oral daily  pantoprazole  Injectable 40 milliGRAM(s) IV Push daily  polyethylene glycol 3350 17 Gram(s) Oral daily  senna 2 Tablet(s) Oral at bedtime      PHYSICAL EXAM:  T(C): 37.1 (03-19-25 @ 21:06), Max: 37.1 (03-19-25 @ 21:06)  HR: 110 (03-19-25 @ 21:06) (92 - 110)  BP: 103/61 (03-19-25 @ 21:36) (90/53 - 104/65)  RR: 18 (03-19-25 @ 21:06) (18 - 18)  SpO2: 93% (03-19-25 @ 21:06) (91% - 93%)  Wt(kg): --  I&O's Summary    18 Mar 2025 07:01  -  19 Mar 2025 07:00  --------------------------------------------------------  IN: 200 mL / OUT: 1400 mL / NET: -1200 mL    19 Mar 2025 07:01  -  20 Mar 2025 00:06  --------------------------------------------------------  IN: 360 mL / OUT: 750 mL / NET: -390 mL          Appearance: Normal	  HEENT:  PERRLA   Lymphatic: No lymphadenopathy   Cardiovascular: Normal S1 S2, no JVD  Respiratory: normal effort , clear  Gastrointestinal:  Soft, Non-tender  Skin: No rashes,  warm to touch  Psychiatry:  Mood & affect appropriate  Musculuskeletal: AKA    recent labs, Imaging and EKGs personally reviewed     03-18-25 @ 07:01  -  03-19-25 @ 07:00  --------------------------------------------------------  IN: 200 mL / OUT: 1400 mL / NET: -1200 mL    03-19-25 @ 07:01  -  03-20-25 @ 00:06  --------------------------------------------------------  IN: 360 mL / OUT: 750 mL / NET: -390 mL                            9.5    11.09 )-----------( 166      ( 19 Mar 2025 06:14 )             28.4               03-19    130[L]  |  95[L]  |  28[H]  ----------------------------<  200[H]  4.3   |  23  |  0.99    Ca    8.4      19 Mar 2025 06:14  Phos  2.9     03-18  Mg     2.1     03-18                         Urinalysis Basic - ( 19 Mar 2025 06:14 )    Color: x / Appearance: x / SG: x / pH: x  Gluc: 200 mg/dL / Ketone: x  / Bili: x / Urobili: x   Blood: x / Protein: x / Nitrite: x   Leuk Esterase: x / RBC: x / WBC x   Sq Epi: x / Non Sq Epi: x / Bacteria: x

## 2025-03-19 NOTE — PROGRESS NOTE ADULT - ASSESSMENT
63y/o F w/h/o uncontrolled T2DM (A1C 11.6%) on Tresiba and CeQur insulin patch PTA. DM c/b PAD, retinopathy, CKD, CAD. Also h/oType 2 DM, HTN, HLD. Presented to OSH with worsening right leg pain and fluid secretion. Transferred to [facility] for CO2 angiogram. Found to have Low EF to 20% in [facility]BALDO. s/p high risk PCI on 3/3. s/p L AKA and now on HD. Endocrine consulted for Type 2 DM management, uncontrolled. Pt tolerating POs with BG levels still above goal while on present insulin doses> will increase insulin doses to BG goal 100-180mg/dL inpatient without hypoglycemia. Slow titration due to ESRD      Home regimen: Tresiba 40 units, CeQur insulin patch about 2-10 units TID with meals

## 2025-03-19 NOTE — ADVANCED PRACTICE NURSE CONSULT - ASSESSMENT
Patient encountered on    When wound care RN arrived on unit, patient was found lying in a low air loss pressure redistribution support surface style bed.  Patient was alert and oriented and gave consent to skin consult.      This patient is unable to turn independently and staff assistance x 2 was provided.  Once turned, fecal/urinary incontinence...     The wound care RN was able to visualize an area of persistent nonblanchable deep red, maroon discoloration with purple hues.  Once consult was complete, patient and family were educated regarding the need for routine turning and positioning to prevent pressure injuries and patient was placed in a left side-lying position utilizing pillow positioner assistive devices.    Patient encountered on 4 Ciaran. When wound care RN arrived on unit, patient was found lying in a low air loss pressure redistribution support surface style bed with her brother at bedside. Patient was alert and oriented and gave consent to skin consult. Ms Kirby is able to turn with staff assistance, x 2 was provided. Once turned, the wound care RN was able to visualize an area on right buttocks with superficial partial thickness skin loss measuring approximately 1.5cm x 1.5cm x 0.1 and left buttocks with an open area measuring approximately 1.5cm x 1.5cm x unknown with fibrinous tissue covering 100% of wound base- possible unstageable pressure injury vs incontinence associated dermatitis with fibrinous presence. Right AKA noted, stump wrapped with ace, left foot wrapped with cling. Once consult was complete, patient and family were educated regarding the need for routine turning and positioning to prevent pressure injuries and patient was placed in a modified left side-lying position utilizing pillow positioner assistive devices. Patient encountered on 4 Monti. When wound care RN arrived on unit, patient was found lying in a low air loss pressure redistribution support surface style bed with her brother at bedside. Patient's brother stated that she has had this skin injury "for months" and asks that the staff perform "minimal interventions" and to avoid unnecessary movement of patient, stating "when she is better, we can do physical movement." Patient was alert and oriented and gave consent to skin consult. Ms Kirby is able to turn with staff assistance, x 2 was provided. Once turned, the wound care RN was able to visualize an area on right buttocks with superficial partial thickness skin loss measuring approximately 1.5cm x 1.5cm x 0.1 and left buttocks with an open area measuring approximately 1.5cm x 1.5cm x unknown with fibrinous tissue covering 100% of wound base- possible unstageable pressure injury vs moisture associated dermatitis with fibrinous presence. Right AKA noted, stump wrapped with ace, left foot wrapped with cling. Once consult was complete, patient and family were educated regarding the need for routine turning and positioning to prevent pressure injuries and patient was placed in a modified left side-lying position utilizing pillow positioner assistive devices. Patient encountered on 4 Ciaran. When wound care RN arrived on unit, patient was found lying in a low air loss pressure redistribution support surface style bed with her brother at bedside. Patient's brother asks that the staff perform "minimal interventions" and to avoid unnecessary movement of patient, stating "when she is better, we can do physical movement." Patient was alert and oriented and gave consent to skin consult. Ms Kirby is able to turn with staff assistance, x 2 was provided. Once turned, the wound care RN was able to visualize an area on right buttocks with superficial partial thickness skin loss measuring approximately 1.5cm x 1.5cm x 0.1 and left buttocks with an open area measuring approximately 1.5cm x 1.5cm x unknown with fibrinous tissue covering 100% of wound base- possible unstageable pressure injury vs moisture associated dermatitis with fibrinous presence. Right AKA noted, stump wrapped with ace, left foot wrapped with cling. Once consult was complete, patient and family were educated regarding the need for routine turning and positioning to prevent pressure injuries and patient was placed in a modified left side-lying position utilizing pillow positioner assistive devices.

## 2025-03-19 NOTE — PROGRESS NOTE ADULT - PROBLEM SELECTOR PLAN 1
-Primarily with exertion & when laying flat. Pt denies SOB at rest.  -Suspect 2nd to fluid overload, underlying CHF  -Keep O>I as tolerated  -VBG 2/14 acceptable  -S/p ABX per primary team for LE wounds  -CTA chest with b/l pl effusions R>L, congestion, negative for PE  -Keep sats >90% with O2 PRN (currently RA).  -SOB and cough improved  -Incentive spirometry use encouraged  -CXR in AM.

## 2025-03-20 ENCOUNTER — RESULT REVIEW (OUTPATIENT)
Age: 65
End: 2025-03-20

## 2025-03-20 LAB
ANION GAP SERPL CALC-SCNC: 12 MMOL/L — SIGNIFICANT CHANGE UP (ref 5–17)
BUN SERPL-MCNC: 25 MG/DL — HIGH (ref 7–23)
CALCIUM SERPL-MCNC: 8.1 MG/DL — LOW (ref 8.4–10.5)
CHLORIDE SERPL-SCNC: 96 MMOL/L — SIGNIFICANT CHANGE UP (ref 96–108)
CO2 SERPL-SCNC: 23 MMOL/L — SIGNIFICANT CHANGE UP (ref 22–31)
CREAT SERPL-MCNC: 1.06 MG/DL — SIGNIFICANT CHANGE UP (ref 0.5–1.3)
EGFR: 59 ML/MIN/1.73M2 — LOW
EGFR: 59 ML/MIN/1.73M2 — LOW
GLUCOSE BLDC GLUCOMTR-MCNC: 168 MG/DL — HIGH (ref 70–99)
GLUCOSE BLDC GLUCOMTR-MCNC: 174 MG/DL — HIGH (ref 70–99)
GLUCOSE BLDC GLUCOMTR-MCNC: 209 MG/DL — HIGH (ref 70–99)
GLUCOSE BLDC GLUCOMTR-MCNC: 255 MG/DL — HIGH (ref 70–99)
GLUCOSE SERPL-MCNC: 263 MG/DL — HIGH (ref 70–99)
HCT VFR BLD CALC: 28.5 % — LOW (ref 34.5–45)
HGB BLD-MCNC: 9.7 G/DL — LOW (ref 11.5–15.5)
MAGNESIUM SERPL-MCNC: 1.8 MG/DL — SIGNIFICANT CHANGE UP (ref 1.6–2.6)
MCHC RBC-ENTMCNC: 25.5 PG — LOW (ref 27–34)
MCHC RBC-ENTMCNC: 34 G/DL — SIGNIFICANT CHANGE UP (ref 32–36)
MCV RBC AUTO: 75 FL — LOW (ref 80–100)
NRBC BLD AUTO-RTO: 0 /100 WBCS — SIGNIFICANT CHANGE UP (ref 0–0)
PHOSPHATE SERPL-MCNC: 1.9 MG/DL — LOW (ref 2.5–4.5)
PLATELET # BLD AUTO: 194 K/UL — SIGNIFICANT CHANGE UP (ref 150–400)
POTASSIUM SERPL-MCNC: 4.5 MMOL/L — SIGNIFICANT CHANGE UP (ref 3.5–5.3)
POTASSIUM SERPL-SCNC: 4.5 MMOL/L — SIGNIFICANT CHANGE UP (ref 3.5–5.3)
RBC # BLD: 3.8 M/UL — SIGNIFICANT CHANGE UP (ref 3.8–5.2)
RBC # FLD: 22.3 % — HIGH (ref 10.3–14.5)
SODIUM SERPL-SCNC: 131 MMOL/L — LOW (ref 135–145)
WBC # BLD: 10.73 K/UL — HIGH (ref 3.8–10.5)
WBC # FLD AUTO: 10.73 K/UL — HIGH (ref 3.8–10.5)

## 2025-03-20 PROCEDURE — 93308 TTE F-UP OR LMTD: CPT | Mod: 26

## 2025-03-20 PROCEDURE — 71045 X-RAY EXAM CHEST 1 VIEW: CPT | Mod: 26

## 2025-03-20 PROCEDURE — 99232 SBSQ HOSP IP/OBS MODERATE 35: CPT

## 2025-03-20 RX ORDER — HYDROMORPHONE/SOD CHLOR,ISO/PF 2 MG/10 ML
0.2 SYRINGE (ML) INJECTION ONCE
Refills: 0 | Status: DISCONTINUED | OUTPATIENT
Start: 2025-03-20 | End: 2025-03-20

## 2025-03-20 RX ORDER — SPIRONOLACTONE 25 MG
25 TABLET ORAL DAILY
Refills: 0 | Status: DISCONTINUED | OUTPATIENT
Start: 2025-03-20 | End: 2025-03-26

## 2025-03-20 RX ORDER — SOD PHOS DI, MONO/K PHOS MONO 250 MG
1 TABLET ORAL THREE TIMES A DAY
Refills: 0 | Status: DISCONTINUED | OUTPATIENT
Start: 2025-03-20 | End: 2025-03-20

## 2025-03-20 RX ORDER — HEPARIN SODIUM 1000 [USP'U]/ML
5000 INJECTION INTRAVENOUS; SUBCUTANEOUS EVERY 12 HOURS
Refills: 0 | Status: DISCONTINUED | OUTPATIENT
Start: 2025-03-20 | End: 2025-03-27

## 2025-03-20 RX ORDER — HYDROMORPHONE/SOD CHLOR,ISO/PF 2 MG/10 ML
0.5 SYRINGE (ML) INJECTION ONCE
Refills: 0 | Status: DISCONTINUED | OUTPATIENT
Start: 2025-03-20 | End: 2025-03-20

## 2025-03-20 RX ADMIN — LISINOPRIL 2.5 MILLIGRAM(S): 5 TABLET ORAL at 00:27

## 2025-03-20 RX ADMIN — Medication 5 MILLIGRAM(S): at 21:40

## 2025-03-20 RX ADMIN — OXYCODONE HYDROCHLORIDE 5 MILLIGRAM(S): 30 TABLET ORAL at 03:11

## 2025-03-20 RX ADMIN — METHOCARBAMOL 750 MILLIGRAM(S): 500 TABLET, FILM COATED ORAL at 08:33

## 2025-03-20 RX ADMIN — Medication 0.2 MILLIGRAM(S): at 00:24

## 2025-03-20 RX ADMIN — OXYCODONE HYDROCHLORIDE 5 MILLIGRAM(S): 30 TABLET ORAL at 22:44

## 2025-03-20 RX ADMIN — INSULIN LISPRO 10 UNIT(S): 100 INJECTION, SOLUTION INTRAVENOUS; SUBCUTANEOUS at 17:15

## 2025-03-20 RX ADMIN — INSULIN GLARGINE-YFGN 18 UNIT(S): 100 INJECTION, SOLUTION SUBCUTANEOUS at 21:41

## 2025-03-20 RX ADMIN — Medication 1 TABLET(S): at 10:59

## 2025-03-20 RX ADMIN — INSULIN LISPRO 10 UNIT(S): 100 INJECTION, SOLUTION INTRAVENOUS; SUBCUTANEOUS at 12:35

## 2025-03-20 RX ADMIN — OXYCODONE HYDROCHLORIDE 5 MILLIGRAM(S): 30 TABLET ORAL at 21:44

## 2025-03-20 RX ADMIN — Medication 0.5 MILLIGRAM(S): at 12:35

## 2025-03-20 RX ADMIN — Medication 5 MILLIGRAM(S): at 08:41

## 2025-03-20 RX ADMIN — INSULIN LISPRO 10 UNIT(S): 100 INJECTION, SOLUTION INTRAVENOUS; SUBCUTANEOUS at 08:39

## 2025-03-20 RX ADMIN — FUROSEMIDE 40 MILLIGRAM(S): 10 INJECTION INTRAMUSCULAR; INTRAVENOUS at 08:32

## 2025-03-20 RX ADMIN — OXYCODONE HYDROCHLORIDE 5 MILLIGRAM(S): 30 TABLET ORAL at 04:13

## 2025-03-20 RX ADMIN — Medication 975 MILLIGRAM(S): at 22:44

## 2025-03-20 RX ADMIN — Medication 81 MILLIGRAM(S): at 11:00

## 2025-03-20 RX ADMIN — METHOCARBAMOL 750 MILLIGRAM(S): 500 TABLET, FILM COATED ORAL at 11:01

## 2025-03-20 RX ADMIN — INSULIN LISPRO 1: 100 INJECTION, SOLUTION INTRAVENOUS; SUBCUTANEOUS at 17:15

## 2025-03-20 RX ADMIN — OXYCODONE HYDROCHLORIDE 5 MILLIGRAM(S): 30 TABLET ORAL at 09:30

## 2025-03-20 RX ADMIN — GABAPENTIN 300 MILLIGRAM(S): 400 CAPSULE ORAL at 08:31

## 2025-03-20 RX ADMIN — Medication 3 MILLIGRAM(S): at 00:27

## 2025-03-20 RX ADMIN — GABAPENTIN 300 MILLIGRAM(S): 400 CAPSULE ORAL at 15:40

## 2025-03-20 RX ADMIN — ATORVASTATIN CALCIUM 40 MILLIGRAM(S): 80 TABLET, FILM COATED ORAL at 21:43

## 2025-03-20 RX ADMIN — METHOCARBAMOL 750 MILLIGRAM(S): 500 TABLET, FILM COATED ORAL at 00:30

## 2025-03-20 RX ADMIN — POLYETHYLENE GLYCOL 3350 17 GRAM(S): 17 POWDER, FOR SOLUTION ORAL at 10:59

## 2025-03-20 RX ADMIN — Medication 63.75 MILLIMOLE(S): at 17:21

## 2025-03-20 RX ADMIN — CLOPIDOGREL BISULFATE 75 MILLIGRAM(S): 75 TABLET, FILM COATED ORAL at 11:00

## 2025-03-20 RX ADMIN — Medication 975 MILLIGRAM(S): at 08:34

## 2025-03-20 RX ADMIN — Medication 25 MILLIGRAM(S): at 08:33

## 2025-03-20 RX ADMIN — Medication 2 TABLET(S): at 21:41

## 2025-03-20 RX ADMIN — Medication 0.2 MILLIGRAM(S): at 01:00

## 2025-03-20 RX ADMIN — OXYCODONE HYDROCHLORIDE 5 MILLIGRAM(S): 30 TABLET ORAL at 08:32

## 2025-03-20 RX ADMIN — LISINOPRIL 2.5 MILLIGRAM(S): 5 TABLET ORAL at 21:42

## 2025-03-20 RX ADMIN — METHOCARBAMOL 750 MILLIGRAM(S): 500 TABLET, FILM COATED ORAL at 21:42

## 2025-03-20 RX ADMIN — HEPARIN SODIUM 5000 UNIT(S): 1000 INJECTION INTRAVENOUS; SUBCUTANEOUS at 21:42

## 2025-03-20 RX ADMIN — Medication 40 MILLIGRAM(S): at 11:00

## 2025-03-20 RX ADMIN — INSULIN LISPRO 3: 100 INJECTION, SOLUTION INTRAVENOUS; SUBCUTANEOUS at 08:36

## 2025-03-20 RX ADMIN — Medication 975 MILLIGRAM(S): at 21:42

## 2025-03-20 RX ADMIN — GABAPENTIN 300 MILLIGRAM(S): 400 CAPSULE ORAL at 21:43

## 2025-03-20 RX ADMIN — INSULIN LISPRO 2: 100 INJECTION, SOLUTION INTRAVENOUS; SUBCUTANEOUS at 12:35

## 2025-03-20 NOTE — PROVIDER CONTACT NOTE (OTHER) - NAME OF MD/NP/PA/DO NOTIFIED:
GERMAIN Henderson
Yolande Henderson
Ivan Casas
Ni- Shanell Powhatan
Nurys Harris
Nunu Lopez
GERMAIN Harris [facility]
Nurys
night ACP Willa HANDY
MD Hugo Walker
Michi Odell, PA
Ni-Shanell twiggs
Melivn Tian NP

## 2025-03-20 NOTE — PROGRESS NOTE ADULT - ASSESSMENT
65y/o F w/h/o uncontrolled T2DM (A1C 11.6%) on Tresiba and CeQur insulin patch PTA. DM c/b PAD, retinopathy, CKD, CAD. Also h/oType 2 DM, HTN, HLD. Presented to OSH with worsening right leg pain and fluid secretion. Transferred to [facility] for CO2 angiogram. Found to have Low EF to 20% in [facility]. s/p high risk PCI on 3/3. s/p R AKA and now on HD.     Endocrine consulted for Type 2 DM management, uncontrolled. Pt tolerating POs with BG levels still above goal while on present insulin doses.  Explained to patient that if she snacks she should not eat within 2 hours of getting her BG checked.  I wrote her a note to remind her. Will adjust insulin doses to keep  BG goal 100-180mg/dL inpatient without hypoglycemia. Slow titration due to ESRD      Home regimen: Tresiba 40 units, CeQur insulin patch about 2-10 units TID with meals

## 2025-03-20 NOTE — PROGRESS NOTE ADULT - SUBJECTIVE AND OBJECTIVE BOX
Chief Complaint: Diabetes Mellitus follow up    INTERVAL HX:  Patient seen at bedside She is feeling better aside from her pain but has been getting pain medication which helps.   Reports eating well, finishes 100% of food on each tray. She does snack as well.   BG over the last 24 hrs have been hyperglycemic goal range 100-180mg/dl. No hypoglycemia.     Review of Systems:  General: As above  GI: No nausea, vomiting  Endocrine: no  S&Sx of hypoglycemia    Allergies    No Known Allergies    Intolerances    Augmentin (Stomach Upset; Vomiting; Nausea)    MEDICATIONS  (STANDING):  acetaminophen     Tablet .. 975 milliGRAM(s) Oral every 8 hours  aspirin enteric coated 81 milliGRAM(s) Oral daily  atorvastatin 40 milliGRAM(s) Oral at bedtime  bisacodyl 5 milliGRAM(s) Oral every 12 hours  chlorhexidine 4% Liquid 1 Application(s) Topical <User Schedule>  clopidogrel Tablet 75 milliGRAM(s) Oral daily  dextrose 5%. 1000 milliLiter(s) (100 mL/Hr) IV Continuous <Continuous>  dextrose 5%. 1000 milliLiter(s) (50 mL/Hr) IV Continuous <Continuous>  dextrose 50% Injectable 25 Gram(s) IV Push once  dextrose 50% Injectable 12.5 Gram(s) IV Push once  dextrose 50% Injectable 25 Gram(s) IV Push once  furosemide    Tablet 40 milliGRAM(s) Oral daily  gabapentin 300 milliGRAM(s) Oral every 8 hours  glucagon  Injectable 1 milliGRAM(s) IntraMuscular once  heparin   Injectable 5000 Unit(s) SubCutaneous every 12 hours  insulin glargine Injectable (LANTUS) 18 Unit(s) SubCutaneous at bedtime  insulin lispro (ADMELOG) corrective regimen sliding scale   SubCutaneous three times a day before meals  insulin lispro (ADMELOG) corrective regimen sliding scale   SubCutaneous at bedtime  insulin lispro Injectable (ADMELOG) 10 Unit(s) SubCutaneous three times a day with meals  lisinopril 2.5 milliGRAM(s) Oral at bedtime  methocarbamol 750 milliGRAM(s) Oral every 6 hours  metoprolol succinate ER 25 milliGRAM(s) Oral daily  Nephro-rober 1 Tablet(s) Oral daily  pantoprazole  Injectable 40 milliGRAM(s) IV Push daily  polyethylene glycol 3350 17 Gram(s) Oral daily  senna 2 Tablet(s) Oral at bedtime  sodium phosphate 15 milliMole(s)/250 mL IVPB 15 milliMole(s) IV Intermittent once  spironolactone 25 milliGRAM(s) Oral daily      atorvastatin   40 milliGRAM(s) Oral (03-19-25 @ 21:54)    insulin glargine Injectable (LANTUS)   18 Unit(s) SubCutaneous (03-19-25 @ 21:57)    insulin lispro (ADMELOG) corrective regimen sliding scale   2 Unit(s) SubCutaneous (03-20-25 @ 12:35)   3 Unit(s) SubCutaneous (03-20-25 @ 08:36)   4 Unit(s) SubCutaneous (03-19-25 @ 17:00)    insulin lispro (ADMELOG) corrective regimen sliding scale   2 Unit(s) SubCutaneous (03-19-25 @ 21:56)    insulin lispro Injectable (ADMELOG)   10 Unit(s) SubCutaneous (03-20-25 @ 12:35)   10 Unit(s) SubCutaneous (03-20-25 @ 08:39)    insulin lispro Injectable (ADMELOG)   8 Unit(s) SubCutaneous (03-19-25 @ 17:00)        PHYSICAL EXAM:  VITALS: T(C): 36.5 (03-20-25 @ 11:15)  T(F): 97.7 (03-20-25 @ 11:15), Max: 98.7 (03-19-25 @ 21:06)  HR: 97 (03-20-25 @ 11:15) (97 - 110)  BP: 94/60 (03-20-25 @ 11:15) (90/53 - 108/63)  RR:  (18 - 18)  SpO2:  (90% - 93%)  Wt(kg): --  GENERAL: NAD  Respiratory: Respirations unlabored   Extremities: Warm, no edema, R AKA  NEURO: Alert , appropriate     LABS:  POCT Blood Glucose.: 209 mg/dL (03-20-25 @ 12:06)  POCT Blood Glucose.: 255 mg/dL (03-20-25 @ 08:01)  POCT Blood Glucose.: 319 mg/dL (03-19-25 @ 21:42)  POCT Blood Glucose.: 319 mg/dL (03-19-25 @ 16:38)  POCT Blood Glucose.: 230 mg/dL (03-19-25 @ 11:48)  POCT Blood Glucose.: 210 mg/dL (03-19-25 @ 08:27)  POCT Blood Glucose.: 220 mg/dL (03-18-25 @ 21:06)  POCT Blood Glucose.: 252 mg/dL (03-18-25 @ 16:34)  POCT Blood Glucose.: 223 mg/dL (03-18-25 @ 11:57)  POCT Blood Glucose.: 293 mg/dL (03-18-25 @ 08:15)  POCT Blood Glucose.: 201 mg/dL (03-17-25 @ 21:31)  POCT Blood Glucose.: 205 mg/dL (03-17-25 @ 17:46)                          9.7    10.73 )-----------( 194      ( 20 Mar 2025 07:09 )             28.5     03-20    131[L]  |  96  |  25[H]  ----------------------------<  263[H]  4.5   |  23  |  1.06    Ca    8.1[L]      20 Mar 2025 07:09  Phos  1.9     03-20  Mg     1.8     03-20      eGFR: 59 mL/min/1.73m2 (20 Mar 2025 07:09)    01-24 Chol 122 Direct LDL -- LDL calculated 66 HDL 39[L] Trig 89  Thyroid Function Tests:      A1C with Estimated Average Glucose Result: 11.6 % (01-24-25 @ 05:40)    Estimated Average Glucose: 286 mg/dL (01-24-25 @ 05:40)        Diet, Regular:   Consistent Carbohydrate No Snacks (CSTCHO)  1000mL Fluid Restriction ([id number])  No Concentrated Potassium  Low Sodium  No Concentrated Phosphorus  Halal  Lacto Veg (Accepts Milk & Milk Products)  Supplement Feeding Modality:  Oral  Nepro Cans or Servings Per Day:  1       Frequency:  Daily (03-20-25 @ 09:11) [Active]

## 2025-03-20 NOTE — PROVIDER CONTACT NOTE (OTHER) - ACTION/TREATMENT ORDERED:
Provider notified, patient educated on risk/benefit for imminent post surgical follow-up tests/exams

## 2025-03-20 NOTE — CONSULT NOTE ADULT - SUBJECTIVE AND OBJECTIVE BOX
Interventional Radiology    Evaluate for Procedure: Permacath removal on Monday 3/24/25    HPI: 64y Female with history of CHF presents as a transfer for LE CO2 angiogram. Nephrology consulted for elevated Scr, HD initiated on this admission. Tunneled HD catheter placed 3/11. Now patient with renal improvement. IR consulted for tunneled HD catheter removal.     Allergies: No Known Allergies    Medications (Abx/Cardiac/Anticoagulation/Blood Products)  aspirin enteric coated: 81 milliGRAM(s) Oral (03-20 @ 11:00)  clopidogrel Tablet: 75 milliGRAM(s) Oral (03-20 @ 11:00)  furosemide    Tablet: 40 milliGRAM(s) Oral (03-20 @ 08:32)  lisinopril: 2.5 milliGRAM(s) Oral (03-20 @ 00:27)  spironolactone: 25 milliGRAM(s) Oral (03-20 @ 08:33)    Data:  T(C): 36.5  HR: 97  BP: 94/60  RR: 18  SpO2: 92%    -WBC 10.73 / HgB 9.7 / Hct 28.5 / Plt 194  -Na 131 / Cl 96 / BUN 25 / Glucose 263  -K 4.5 / CO2 23 / Cr 1.06  -ALT -- / Alk Phos -- / T.Bili --  -INR 1.28 / PTT 30.5    Radiology:     Assessment/Plan:   64y Female with history of CHF presents as a transfer for LE CO2 angiogram. Nephrology consulted for elevated Scr, HD initiated on this admission. Tunneled HD catheter placed 3/11. Now patient with renal improvement. IR consulted for tunneled HD catheter removal.         - case reviewed and approved for tunneled HD catheter removal on Monday 3/24  - please place IR procedure order under JANET Lizarraga (free text)   - STAT labs in AM (cbc,coags, bmp, T&S)  - hold HSQ 12 hours prior to procedure with tentative plan to resume AC 6 hours post procedure, if no concern for bleeding  - no need for NPO   - d/w primary team

## 2025-03-20 NOTE — PROGRESS NOTE ADULT - SUBJECTIVE AND OBJECTIVE BOX
Follow-up Pulm Progress Note    No new respiratory events overnight.  Denies SOB/CP.     Medications:  MEDICATIONS  (STANDING):  acetaminophen     Tablet .. 975 milliGRAM(s) Oral every 8 hours  aspirin enteric coated 81 milliGRAM(s) Oral daily  atorvastatin 40 milliGRAM(s) Oral at bedtime  bisacodyl 5 milliGRAM(s) Oral every 12 hours  chlorhexidine 4% Liquid 1 Application(s) Topical <User Schedule>  clopidogrel Tablet 75 milliGRAM(s) Oral daily  dextrose 5%. 1000 milliLiter(s) (100 mL/Hr) IV Continuous <Continuous>  dextrose 5%. 1000 milliLiter(s) (50 mL/Hr) IV Continuous <Continuous>  dextrose 50% Injectable 25 Gram(s) IV Push once  dextrose 50% Injectable 12.5 Gram(s) IV Push once  dextrose 50% Injectable 25 Gram(s) IV Push once  furosemide    Tablet 40 milliGRAM(s) Oral daily  gabapentin 300 milliGRAM(s) Oral every 8 hours  glucagon  Injectable 1 milliGRAM(s) IntraMuscular once  heparin   Injectable 5000 Unit(s) SubCutaneous every 12 hours  insulin glargine Injectable (LANTUS) 18 Unit(s) SubCutaneous at bedtime  insulin lispro (ADMELOG) corrective regimen sliding scale   SubCutaneous three times a day before meals  insulin lispro (ADMELOG) corrective regimen sliding scale   SubCutaneous at bedtime  insulin lispro Injectable (ADMELOG) 10 Unit(s) SubCutaneous three times a day with meals  lisinopril 2.5 milliGRAM(s) Oral at bedtime  methocarbamol 750 milliGRAM(s) Oral every 6 hours  metoprolol succinate ER 25 milliGRAM(s) Oral daily  Nephro-rober 1 Tablet(s) Oral daily  pantoprazole  Injectable 40 milliGRAM(s) IV Push daily  polyethylene glycol 3350 17 Gram(s) Oral daily  potassium phosphate / sodium phosphate Powder (PHOS-NaK) 1 Packet(s) Oral three times a day  senna 2 Tablet(s) Oral at bedtime  spironolactone 25 milliGRAM(s) Oral daily    MEDICATIONS  (PRN):  ALPRAZolam 0.25 milliGRAM(s) Oral daily PRN anxiety  dextrose Oral Gel 15 Gram(s) Oral once PRN Blood Glucose LESS THAN 70 milliGRAM(s)/deciliter  melatonin 3 milliGRAM(s) Oral at bedtime PRN Insomnia  ondansetron Injectable 4 milliGRAM(s) IV Push every 6 hours PRN Nausea and/or Vomiting  oxyCODONE    IR 5 milliGRAM(s) Oral every 4 hours PRN Severe Pain (7 - 10)  sodium chloride 0.65% Nasal 1 Spray(s) Both Nostrils three times a day PRN Dryness  sodium chloride 0.9% lock flush 10 milliLiter(s) IV Push every 1 hour PRN Pre/post blood products, medications, blood draw, and to maintain line patency          Vital Signs Last 24 Hrs  T(C): 36.7 (20 Mar 2025 05:27), Max: 37.1 (19 Mar 2025 21:06)  T(F): 98.1 (20 Mar 2025 05:27), Max: 98.7 (19 Mar 2025 21:06)  HR: 102 (20 Mar 2025 05:27) (98 - 110)  BP: 108/63 (20 Mar 2025 05:27) (90/53 - 108/63)  BP(mean): --  RR: 18 (20 Mar 2025 05:27) (18 - 18)  SpO2: 91% (20 Mar 2025 05:27) (90% - 93%)    Parameters below as of 20 Mar 2025 05:27  Patient On (Oxygen Delivery Method): room air              03-19 @ 07:01  -  03-20 @ 07:00  --------------------------------------------------------  IN: 360 mL / OUT: 2000 mL / NET: -1640 mL          LABS:                        9.7    10.73 )-----------( 194      ( 20 Mar 2025 07:09 )             28.5     03-20    131[L]  |  96  |  25[H]  ----------------------------<  263[H]  4.5   |  23  |  1.06    Ca    8.1[L]      20 Mar 2025 07:09  Phos  1.9     03-20  Mg     1.8     03-20            CAPILLARY BLOOD GLUCOSE      POCT Blood Glucose.: 209 mg/dL (20 Mar 2025 12:06)      Urinalysis Basic - ( 20 Mar 2025 07:09 )    Color: x / Appearance: x / SG: x / pH: x  Gluc: 263 mg/dL / Ketone: x  / Bili: x / Urobili: x   Blood: x / Protein: x / Nitrite: x   Leuk Esterase: x / RBC: x / WBC x   Sq Epi: x / Non Sq Epi: x / Bacteria: x                Physical Examination:  PULM: Clear to auscultation bilaterally, no significant sputum production  CVS: S1, S2 heard    RADIOLOGY REVIEWED  CXR: mild congestion

## 2025-03-20 NOTE — PROGRESS NOTE ADULT - SUBJECTIVE AND OBJECTIVE BOX
MR#[id number]  PATIENT NAME:COEL ALBA    DATE OF SERVICE: 03-20-25 @ 07:08  Patient was seen and examined by Yordy Gilmore MD on    03-20-25 @ 07:08 .  Interim events noted.Consultant notes ,Labs,Telemetry reviewed by me       HOSPITAL COURSE: HPI:  64F, hx of CHF (last TTE on 1/16/25 EF 30-35%, G2DD), DM, diabetic foot ulcer with a recent admission at [facility] for CHF exacerbation presented as a transfer for worsening R. leg pain and fluid secretion, On non-invasive imaging demonstrating severe depletion of RLE blood flow beyond the popliteal vessel at knee and similar findings in L. Was transferred to [facility] for CO2 angiogram with Dr. Baltazar. (29 Jan 2025 20:05)      INTERIM EVENTS:Patient seen at bedside ,interim events noted.  03/18-POD#1 Right AKA  03/19-Awake alert pain controlled-no dyspnea Sinus Rhythm  03/20-Awake afebrile no dyspnea started on low dose ACE      MEDICATIONS  (STANDING):  acetaminophen     Tablet .. 975 milliGRAM(s) Oral every 8 hours  aspirin enteric coated 81 milliGRAM(s) Oral daily  atorvastatin 40 milliGRAM(s) Oral at bedtime  bisacodyl 5 milliGRAM(s) Oral every 12 hours  chlorhexidine 4% Liquid 1 Application(s) Topical <User Schedule>  clopidogrel Tablet 75 milliGRAM(s) Oral daily  furosemide    Tablet 40 milliGRAM(s) Oral daily  gabapentin 300 milliGRAM(s) Oral every 8 hours  glucagon  Injectable 1 milliGRAM(s) IntraMuscular once  insulin glargine Injectable (LANTUS) 18 Unit(s) SubCutaneous at bedtime  insulin lispro (ADMELOG) corrective regimen sliding scale   SubCutaneous three times a day before meals  insulin lispro (ADMELOG) corrective regimen sliding scale   SubCutaneous at bedtime  insulin lispro Injectable (ADMELOG) 10 Unit(s) SubCutaneous three times a day with meals  lisinopril 2.5 milliGRAM(s) Oral at bedtime  methocarbamol 750 milliGRAM(s) Oral every 6 hours  metoprolol succinate ER 25 milliGRAM(s) Oral daily  Nephro-rober 1 Tablet(s) Oral daily  pantoprazole  Injectable 40 milliGRAM(s) IV Push daily  polyethylene glycol 3350 17 Gram(s) Oral daily  senna 2 Tablet(s) Oral at bedtime    MEDICATIONS  (PRN):  ALPRAZolam 0.25 milliGRAM(s) Oral daily PRN anxiety  dextrose Oral Gel 15 Gram(s) Oral once PRN Blood Glucose LESS THAN 70 milliGRAM(s)/deciliter  melatonin 3 milliGRAM(s) Oral at bedtime PRN Insomnia  ondansetron Injectable 4 milliGRAM(s) IV Push every 6 hours PRN Nausea and/or Vomiting  oxyCODONE    IR 5 milliGRAM(s) Oral every 4 hours PRN Severe Pain (7 - 10)  sodium chloride 0.65% Nasal 1 Spray(s) Both Nostrils three times a day PRN Dryness  sodium chloride 0.9% lock flush 10 milliLiter(s) IV Push every 1 hour PRN Pre/post blood products, medications, blood draw, and to maintain line patency            REVIEW OF SYSTEMS:  Constitutional: [ ] fever, [ ]weight loss,  [x ]fatigue [ ]weight gain  Eyes: [ ] visual changes  Respiratory: [ ]shortness of breath;  [ ] cough, [ ]wheezing, [ ]chills, [ ]hemoptysis  Cardiovascular: [ ] chest pain, [ ]palpitations, [ ]dizziness,  [ ]leg swelling[ ]orthopnea[ ]PND  Gastrointestinal: [ ] abdominal pain, [ ]nausea, [ ]vomiting,  [ ]diarrhea [ ]Constipation [ ]Melena  Genitourinary: [ ] dysuria, [ ] hematuria [ ][place]  Neurologic: [ ] headaches [ ] tremors[ ]weakness [ ]Paralysis Right[ ] Left[ ]  Skin: [ ] itching, [ ]burning, [ ] rashes  Endocrine: [ ] heat or cold intolerance  Musculoskeletal: [ ] joint pain or swelling; [ ] muscle, back, or extremity pain  Psychiatric: [ ] depression, [ ]anxiety, [ ]mood swings, or [ ]difficulty sleeping  Hematologic: [ ] easy bruising, [ ] bleeding gums    [ ] All remaining systems negative except as per above.   [ ]Unable to obtain.  [x] No change in ROS since admission      Vital Signs Last 24 Hrs  T(C): 36.7 (20 Mar 2025 05:27), Max: 37.1 (19 Mar 2025 21:06)  T(F): 98.1 (20 Mar 2025 05:27), Max: 98.7 (19 Mar 2025 21:06)  HR: 102 (20 Mar 2025 05:27) (92 - 110)  BP: 108/63 (20 Mar 2025 05:27) (90/53 - 108/63)  RR: 18 (20 Mar 2025 05:27) (18 - 18)  SpO2: 91% (20 Mar 2025 05:27) (90% - 93%)    Parameters below as of 20 Mar 2025 05:27  Patient On (Oxygen Delivery Method): room air      I&O's Summary    19 Mar 2025 07:01  -  20 Mar 2025 07:00  --------------------------------------------------------  IN: 360 mL / OUT: 2000 mL / NET: -1640 mL        PHYSICAL EXAM:  General: No acute distress BMI-28  HEENT: EOMI, PERRL  Neck: Supple, [ ] JVD  Lungs: Equal air entry bilaterally; [ ] rales [ ] wheezing [ ] rhonchi  Heart: Regular rate and rhythm; [x ] murmur   2/6 [ x] systolic [ ] diastolic [ ] radiation[ ] rubs [ ]  gallops  Abdomen: Nontender, bowel sounds present  Extremities: No clubbing, cyanosis, [ ] edema [x ] Right AKA  Nervous system:  Alert & Oriented X3, no focal deficits  Psychiatric: Normal affect  Skin: No rashes or lesions    LABS:  03-19    130[L]  |  95[L]  |  28[H]  ----------------------------<  200[H]  4.3   |  23  |  0.99    Ca    8.4      19 Mar 2025 06:14      Creatinine Trend: 0.99<--, 1.09<--, 1.47<--, 1.80<--, 2.99<--, 3.61<--                        9.5    11.09 )-----------( 166      ( 19 Mar 2025 06:14 )             28.4     12 Lead ECG (03.11.25 @ 04:35) >  SINUS RHYTHM WITHMARKED SINUS ARRHYTHMIA  POSSIBLE ANTERIOR INFARCT , AGE UNDETERMINED  ABNORMAL ECG         TTE W or WO Ultrasound Enhancing Agent (03.10.25 @ 12:48) >  CONCLUSIONS:      1. Left ventricular systolic function is severely decreased with an ejection fraction of 28 % by 3D. Regional wall motion abnormalities present.   2. Multiple segmental abnormalities exist.The basal and mid anterior septum, basal and mid inferior septum, basal and mid inferior wall, and mid inferolateral segment are akinetic.  The entire apex, entire anterior wall, basal and mid anterolateral wall, and basal inferolateral segment are hypokinetic.   3. Reduced right ventricular systolic function.   4. Compared to the transthoracic echocardiogram performed on 2/19/2025, there have been no significant interval changes.   5. Right pleural effusion noted.       VA Duplex Lower Ext Vein Scan, Bilat (03.10.25 @ 13:55) >    IMPRESSION:  No evidence of deep venous thrombosis in either lower extremity.      Cardiac Catheterization (03.04.25 @ 09:07) >  Conclusions:   Impella placed for HR-PCI (pt was turned down for CABG). EF 25%     Severe proximal LCx stenosis s/p successful rotational atherectomy and balloon angioplasty.   Severe LM and proximal LAD stenosis s/p successful rotational atherectomy, balloon angioplasty, and VERA x2.

## 2025-03-20 NOTE — PROVIDER CONTACT NOTE (OTHER) - REASON
RATE CHANGE -- Patient sustaining Sinus Brad 48 for 3 mins
TOYAQUELIN Terrell scan
Patient c/o blurry vision on admission
Pt was regurgitating tube feed, had increased work of breathing, and was trying to cough.
Refusing to leave unit/requesting bedside/move medications
pt complaining of 10/10 b/l LE
Pt hypotensive
Bladder scan
PAF with HR 220s for3s
Pain after hydromorphone
Pt had a nose bleed is on Heparin gtt
Bladder scan 589 mL
Tachycardia 120's

## 2025-03-20 NOTE — PROVIDER CONTACT NOTE (OTHER) - DATE AND TIME:
04-Mar-2025 16:15
01-Feb-2025 03:28
11-Mar-2025 04:00
21-Feb-2025 02:04
25-Feb-2025 20:39
20-Feb-2025 19:37
08-Mar-2025 20:30
20-Mar-2025 16:33
29-Jan-2025 20:00
06-Mar-2025 02:15
09-Mar-2025 06:30
05-Mar-2025 21:54
10-Feb-2025 20:28

## 2025-03-20 NOTE — PROGRESS NOTE ADULT - ASSESSMENT
63 YO F with PMHx of HFrEF 30-35 and grade 2 ddfxn (last TTE on 01/16/25), DM2, and diabetic foot ulcer. Recent admission at [facility] for ADHF. Patient now represents to OSH and ultimately to [facility] as a transfer for worsening right leg pain and fluid secretion. As per documentation, patient was reported to have severe depletion of RLE blood flow beyond the popliteal vessel at knee and similar findings in the left. Patient was transferred to [facility] for CO2 angiogram with Dr. Baltazar. Of note, patient also with reported visual disturbance for which patient was seen and evaluated by opthalmology. Internal Medicine has been consulted on Ms. Kirby's care for medical management.       # Foot ulcers with worsening RLE pain second to pop artery occlusion +/- diabetic ulcers   - RLE Arterial Duplex with popliteal artery occlusion   - LLE Arterial Duplex with underlying disease cannot be excluded   - s/p angio with pop and AT VERA on 2/24   - c/b necrosis of RLE   - S/P AKA 3/17   - Continue on Lipitor and DAPT  - Continue pain control with oxy  - Wound care called for dressing recs   - Pain management       # ADHF/ BiV HFrEF 20   - Recent admission at [facility] for ADHF and on dobutamine outpatient  - TTE 1/16 with EF 30-35 with moderately reduced LVSF and grade 2 ddfxn, normal RVSF , trace TR/ AR, and trace pericardial effusion  - RPT TTE with EF 20, severely decreased LV, decreased RVSF with TAPSE 1.2, and regional wall motion abnormalities present with entire septum, entire apex, entire inferior wall, mid inferolateral segment, and mid anterolateral segment are hypokinetic.   - RHC with RA 20, PA 49/29 (40), PCWP 35, mVO2 51.1, CO/CI 3.8/2.2, SVR 1095 w/ Multivessel CAD   - s/p zaroxyln 10 QD to augment diuresis   - s/p bumex GTT   - s/p HTS to augment diuresis   - RPT limited TTE w/ LVSF severely decreased, reduced RVSF, LVOT VTI 12, mild to mod TR, and mildly elevated right atrial pressure  - s/p bumex 4 IV QD, but anuric and now dc'ed   - Case discussed with EP and needs to be on GDMT for 3 months before AICD placement and should be stabilized off of inotropic support.    - RPT ECHO post cath with EF 28 %, regional WMA, multiple segmental abnormalities exist, and reduced RVSF    - Case discussed with HF and cards and monitoring off milrinone GTT   - Continue on toprol 25 and hydral on nonHD days. EP cleared for BB  - Continue volume removal with HD   - Monitor I and O   - Monitor volume status   - Check daily weights    - Monitor on telemetry  - Monitor electrolytes   - Cardio, HF, Renal, and EP evals appreciated; F/u recs   - Repeat TTE pending  - Started on Lisinopril. Monitor BP     # Bradycardia   - SB to 48 BPM at 0400 while asleep on 3/11 and likely related to BB , however last dose prior to event was 3/10 at 0600.  - Monitor telemetry  - EP eval appreciated; F/u recs --> Cleared for BB     # Tachycardia and Hypoxia  - Tachycardiac and on RA with SPO2 running 90-92   - Could be second to low cardiac output   - CTA with no PE  - Duplex negative for DVT   - On Toprol XL 25 PO QD.   - Monitor HR   - Monitor closely on Tele  - Cardio eval appreciated; F/u recs    # JAMEL with Hyponatremia and Metabolic Acidosis   - Baseline CRE 0.7-1.2 and increased to ~4  - As per OSH documentation, JAMEL was thought to be second to Unasyn/ Bumex / Entresto use and Hypotension   - US RENAL with no hydronephrosis  - Likely cardiorenal induced with low flow state in ADHF, however now rising again and concern for milrinone vs overdiuresis (prerenal) vs cardiorenal.   - Milrinone adjusted and diuresis turned off, however no improvement/ worsened in renal function noted.   - s/p shiley placement and started on HD 2/20  - s/p permacath 3/11  - Continue on HD per renal  - Avoid nephrotoxic agents  - Monitor Cr and daily BMP   - Renal and HF evals appreciated; F/u recs    # CAD with TVD   - NST abnormal with small-sized, moderate defect in apical wall that is predominantly fixed suggestive of an infarction with minimal marcus-infarct ischemia. Post stress LVEF 25.  - s/p LHC with RCA , severe ostial LM disease, diffuse disease in LAD and LCx, some L to R collaterals (Multivessel CAD)  - Case discussed with CTSx and NOT a candidate for CABG due to comorbidities  - Cardiac MRI with greater than 75% subendocardial scarring of the basal to mid-inferior wall of the LV and 50% scarring of the left ventricular basal inferoseptal wall. There is also left ventricular transmural scarring involving the apical septal wall and likely near transmural scarring of the apical lateral wall.  - s/p RPT LHC 3/4 with LM 80 s/p POBA/ VERA with LM 1, pLAD 90 s/p POBA/ VERA with pLAD 1, and Cx 80 s/p POBA with Cx 10.   - DAPT and Statin per cardiology   - IC, Cards and HF following     # BL LE Edema likely in setting of ADHF  - Remove volume as per Cardio, HF and Renal   - BL LE elevation and compression  - LE Duplex neg   - Monitor for now    # Pericardial effusion likely in setting ADHF  - TTE with trace pericardial effusion   - CT Chest with small pericardial effusion   - Denies chest pain, palpitations, chest tightness or discomfort, shortness of breath or dyspnea   - Repeat serial TTE for further evaluation   - Diuresis per cardio/ renal.  - Monitor on telemetry  - Cardio following    # Pleural effusion likely in setting ADHF  - CT Chest with small BL pleural effusions  - Monitor O2 saturation  - Supplement to maintain > 90%   - Diuresis / HD per cardio/ renal.     # Hypernatremia to hyponatremia  - Hypernatremia likely from HTS vs over diuresis/ intravascular dehydration. HTS and diuresis stopped with improved sodium   - Hyponatremia now noted second to volume overload   - Avoid overcorrection > 6-8 mEq in 24 hours  - Monitor sodium with HD    # Transaminitis  - Likely 2/2 hepatic congestion   - Volume removal with HD as tolerated  - Trend LFTs, if uptrend post-HD initiation, check ABD US  - Serial ABD Examinations    # Leukocytosis likely in setting of BL LE ulcers with pop occlusion   - BCx negative   - UCx with probable contamination   - S/P unasyn for ABX.   - Leukocytosis fluctuating, however appears nontoxic with no fever spikes and monitoring closely off ABX  - If febrile check pan cultures   - Trend CBC, temp curve, VS and adjust as tolerated      # Visual Disturbance  - CT Head w/ old infarcts  - On ASA and Statin   - Opthalmology eval appreciated    # Indigestion and Constipation   - PPI, miralax and senna continued  - Monitor BMs    # Anemia likely mixed AOCD vs iron deficiency   - HH stable in 9s on HSQ  - Anemia panel with AOCD   - Started on venofer, but ferritin high and now stopped   - Continue on EPO with HD per renal  - Monitor HH   - Transfuse for Hgb < 8  - Maintain active T/S    # Diabetes Mellitus A1C 11.6   - Continue on lantus 13 with lispro 5 and ISS   - Diabetic DASH diet   - Monitor and adjust glucose levels PRN   - Endocrine following; F/u recs     # HLD and HLD  - Continue on lipitor and toprol  - Monitor BP    # DISPO TBD  - Palliative care called and patient remains FULL CODE         Discussed with Attending and ACP

## 2025-03-20 NOTE — PROVIDER CONTACT NOTE (OTHER) - RECOMMENDATIONS
notify provider
Awaiting provider orders
continue to monitor
notify provider
Awaiting provider orders
Notify MD Hugo Walker
0.5 mg hydromorphone for breakthrough pain
Notify provider
notify provider
Straight cath as ordered. Cont to bladder scan q8h.

## 2025-03-20 NOTE — PROVIDER CONTACT NOTE (OTHER) - BACKGROUND
Dx: HF
Patient admitted for worsening RLE pain and CHF exacerbation on hep and dobutamine drip
Acute on chronic heart failure
64yoF PMHx HFrEF 30-35 and grade 2 ddfxn (last TTE on 01/16/25), DM2, and diabetic foot ulcer. Recent admission at [facility] for ADHF; R leg amputation
Pt was admitted for sepsis.
Pt is a transfer from [place] for worsening R. leg pain and fluid secretion for possible angio
Admitted for nontraumatic ischemic injury
LE wound
nontraumatic ischemic infarction of muscle of left hand
pt diagnosed with Peripheral vascular disease
acute on chronic HF

## 2025-03-20 NOTE — PROGRESS NOTE ADULT - SUBJECTIVE AND OBJECTIVE BOX
Name of Patient : TOMASZ ALBA  MRN: [id number]  Date of visit: 03-20-25 @ 15:58      Subjective: Patient seen and examined. No new events except as noted.   Patient seen earlier this AM. Lying down in bed, pain controlled  Started on low dose Lisinopril    REVIEW OF SYSTEMS:    CONSTITUTIONAL: Generalized weakness   EYES/ENT: No visual changes;  No vertigo or throat pain   NECK: No pain or stiffness  RESPIRATORY: No cough, wheezing, hemoptysis; No shortness of breath  CARDIOVASCULAR: No chest pain or palpitations  GASTROINTESTINAL: No abdominal or epigastric pain. No nausea, vomiting, or hematemesis; No diarrhea or constipation. No melena or hematochezia.  GENITOURINARY: No dysuria, frequency or hematuria  NEUROLOGICAL: No numbness or weakness  SKIN/MSK: Rt AKA, pain controlled   All other review of systems is negative unless indicated above.    MEDICATIONS:  MEDICATIONS  (STANDING):  acetaminophen     Tablet .. 975 milliGRAM(s) Oral every 8 hours  aspirin enteric coated 81 milliGRAM(s) Oral daily  atorvastatin 40 milliGRAM(s) Oral at bedtime  bisacodyl 5 milliGRAM(s) Oral every 12 hours  chlorhexidine 4% Liquid 1 Application(s) Topical <User Schedule>  clopidogrel Tablet 75 milliGRAM(s) Oral daily  dextrose 5%. 1000 milliLiter(s) (100 mL/Hr) IV Continuous <Continuous>  dextrose 5%. 1000 milliLiter(s) (50 mL/Hr) IV Continuous <Continuous>  dextrose 50% Injectable 25 Gram(s) IV Push once  dextrose 50% Injectable 12.5 Gram(s) IV Push once  dextrose 50% Injectable 25 Gram(s) IV Push once  furosemide    Tablet 40 milliGRAM(s) Oral daily  gabapentin 300 milliGRAM(s) Oral every 8 hours  glucagon  Injectable 1 milliGRAM(s) IntraMuscular once  heparin   Injectable 5000 Unit(s) SubCutaneous every 12 hours  insulin glargine Injectable (LANTUS) 18 Unit(s) SubCutaneous at bedtime  insulin lispro (ADMELOG) corrective regimen sliding scale   SubCutaneous three times a day before meals  insulin lispro (ADMELOG) corrective regimen sliding scale   SubCutaneous at bedtime  insulin lispro Injectable (ADMELOG) 10 Unit(s) SubCutaneous three times a day with meals  lisinopril 2.5 milliGRAM(s) Oral at bedtime  methocarbamol 750 milliGRAM(s) Oral every 6 hours  metoprolol succinate ER 25 milliGRAM(s) Oral daily  Nephro-rober 1 Tablet(s) Oral daily  pantoprazole  Injectable 40 milliGRAM(s) IV Push daily  polyethylene glycol 3350 17 Gram(s) Oral daily  senna 2 Tablet(s) Oral at bedtime  sodium phosphate 15 milliMole(s)/250 mL IVPB 15 milliMole(s) IV Intermittent once  spironolactone 25 milliGRAM(s) Oral daily      PHYSICAL EXAM:  T(C): 36.5 (03-20-25 @ 11:15), Max: 37.1 (03-19-25 @ 21:06)  HR: 97 (03-20-25 @ 11:15) (97 - 110)  BP: 94/60 (03-20-25 @ 11:15) (90/53 - 108/63)  RR: 18 (03-20-25 @ 11:15) (18 - 18)  SpO2: 92% (03-20-25 @ 11:15) (90% - 93%)  Wt(kg): --  I&O's Summary    19 Mar 2025 07:01  -  20 Mar 2025 07:00  --------------------------------------------------------  IN: 360 mL / OUT: 2000 mL / NET: -1640 mL    20 Mar 2025 07:01  -  20 Mar 2025 15:58  --------------------------------------------------------  IN: 200 mL / OUT: 0 mL / NET: 200 mL          Appearance: Normal	  HEENT:  Eyes are open   Lymphatic: No lymphadenopathy grossly   Cardiovascular: Normal S1 S2, no JVD  Respiratory: normal effort , clear  Gastrointestinal:  Soft, Non-tender  Skin: No rashes,  warm to touch  Psychiatry:  Mood & affect appropriate  Musculoskeletal: S/P R AKA              03-19-25 @ 07:01  -  03-20-25 @ 07:00  --------------------------------------------------------  IN: 360 mL / OUT: 2000 mL / NET: -1640 mL    03-20-25 @ 07:01  -  03-20-25 @ 15:58  --------------------------------------------------------  IN: 200 mL / OUT: 0 mL / NET: 200 mL                            9.7    10.73 )-----------( 194      ( 20 Mar 2025 07:09 )             28.5               03-20    131[L]  |  96  |  25[H]  ----------------------------<  263[H]  4.5   |  23  |  1.06    Ca    8.1[L]      20 Mar 2025 07:09  Phos  1.9     03-20  Mg     1.8     03-20                         Urinalysis Basic - ( 20 Mar 2025 07:09 )    Color: x / Appearance: x / SG: x / pH: x  Gluc: 263 mg/dL / Ketone: x  / Bili: x / Urobili: x   Blood: x / Protein: x / Nitrite: x   Leuk Esterase: x / RBC: x / WBC x   Sq Epi: x / Non Sq Epi: x / Bacteria: x

## 2025-03-20 NOTE — PROGRESS NOTE ADULT - SUBJECTIVE AND OBJECTIVE BOX
[facility] NEPHROLOGY- PROGRESS NOTE    64y Female with history of CHF presents as a transfer for LE CO2 angiogram. Nephrology consulted for elevated Scr.    Patient s/p R AKA on 3/17.     REVIEW OF SYSTEMS:  Gen: no fevers  Cards: no chest pain  Resp: no dyspnea  GI: no nausea or vomiting or diarrhea  Vascular: no LE edema    Augmentin (Stomach Upset; Vomiting; Nausea)  No Known Allergies      Hospital Medications: Medications reviewed        VITALS:  T(F): 98.1 (03-20-25 @ 05:27), Max: 98.7 (03-19-25 @ 21:06)  HR: 102 (03-20-25 @ 05:27)  BP: 108/63 (03-20-25 @ 05:27)  RR: 18 (03-20-25 @ 05:27)  SpO2: 91% (03-20-25 @ 05:27)  Wt(kg): --    03-19 @ 07:01  -  03-20 @ 07:00  --------------------------------------------------------  IN: 360 mL / OUT: 2000 mL / NET: -1640 mL        PHYSICAL EXAM:  Gen: NAD, calm  Cards: RRR, +S1/S2, no M/G/R  Resp: bibasilar rales  GI: soft, NT/ND, NABS  : + stiles  Vascular: R AKA, + LLE edema, + RIJ TDC intact        LABS:  03-20    131[L]  |  96  |  25[H]  ----------------------------<  263[H]  4.5   |  23  |  1.06    Ca    8.1[L]      20 Mar 2025 07:09  Phos  1.9     03-20  Mg     1.8     03-20      Creatinine Trend: 1.06 <--, 0.99 <--, 1.09 <--, 1.47 <--, 1.80 <--, 2.99 <--                        9.7    10.73 )-----------( 194      ( 20 Mar 2025 07:09 )             28.5     Urine Studies:  Urinalysis Basic - ( 20 Mar 2025 07:09 )    Color:  / Appearance:  / SG:  / pH:   Gluc: 263 mg/dL / Ketone:   / Bili:  / Urobili:    Blood:  / Protein:  / Nitrite:    Leuk Esterase:  / RBC:  / WBC    Sq Epi:  / Non Sq Epi:  / Bacteria:

## 2025-03-20 NOTE — PROGRESS NOTE ADULT - ASSESSMENT
64y.o. Female with PAD, CLTI affecting b/l LE, CHF, chronic b/l LE wound R worsen then the L, with R foot blistering and ulceration was transfer to [facility] for CO2 angiogram. Patient is currently followed by medicine and cardiology now s/p RLE angiogram with pop and AT angioplasty on 2/24/25 now s/p R AKA 3/17.     PLAN:  - Dressing off td 3/20   - Diet: Regular   - Analgesia and antiemetics as needed  - Strict I&O's   - Incentive spirometry    Vascular [id number]   64y.o. Female with PAD, CLTI affecting b/l LE, CHF, chronic b/l LE wound R worsen then the L, with R foot blistering and ulceration was transfer to [facility] for CO2 angiogram. Patient is currently followed by medicine and cardiology now s/p RLE angiogram with pop and AT angioplasty on 2/24/25 now s/p R AKA 3/17.     PLAN:  - Dressing off td 3/20   - LLE arterial duplex pending   - Diet: Regular   - Analgesia and antiemetics as needed  - Strict I&O's   - Incentive spirometry    Vascular [id number]

## 2025-03-20 NOTE — PROGRESS NOTE ADULT - PROBLEM SELECTOR PLAN 1
-Primarily with exertion & when laying flat. Pt denies SOB at rest.  -Suspect 2nd to fluid overload, underlying CHF  -Keep O>I as tolerated  -VBG 2/14 acceptable  -S/p ABX per primary team for LE wounds  -CTA chest with b/l pl effusions R>L, congestion, negative for PE  -Keep sats >90% with O2 PRN (currently RA).  -SOB and cough improved  -Incentive spirometry use encouraged  -CXR this AM with mild congestion.

## 2025-03-20 NOTE — PROGRESS NOTE ADULT - ASSESSMENT
63 y/o F with PMH of CHF (last TTE 1/2025 with EF 30-35%), DM, diabetic foot ulcer, PAD, CAD. Recent admission at [facility] for CHF exacerbation, presented to [facility] as a transfer for worsening R leg pain. Hospital course c/b acute on chronic CHF - TTE with EF 20%, severely decreased LV and RV function, multiple regional motion abnormalities, s/p LHC revealing TVD, pleural/pericardial effusions, JAMEL, leukocytosis suspected to be in the setting of LE wound on IV ABX, persistent RLE pain w/ RLE Arterial Duplex revealing popliteal artery occlusion  Plan for eventual angiogram with vascular when medically optimized. Pulmonary called to consult as pt with c/o SOB.

## 2025-03-20 NOTE — PROGRESS NOTE ADULT - PROBLEM SELECTOR PLAN 1
- Check BG TID AC and HS while on PO diet  - Contiinue Lantus to 18u QHS   - Continue Admelog to 10 units ac meals TID AC (HOLD if NPO) slow titration due to ESRD  - C/w low dose Admelog correctional scales TID AC and HS  Discharge Planning:   - Likely basal/bolus regimen given kidney function (doses TBD closer to d/c). Not a candidate for SGLT2 due to leg ulcers and also significantly decreased EGFR., can consider GLP1 in addition to Basal/bolus> will need close f/u with Optho due to h/o retinopathy.   Can send Rx for ozempic 0.25mg weekly x 4weeks and increase to 0.50mg, or mounjaro 2.5mg 2.5mg x4 weeks, then increase to 5.0mg weekly. (Patient denies medullary thyroid cancer, hx of pancreatitis or MEN2)  - Please make sure patient has DM management supplies (glucometer, lancets, strips, alcohol pads, insulin pen needles).  - Patient should check BGs before meals and at bedtime. Contact PCP/endocrinology if BG is less than 70 X1, greater than  400 X1 or persistently greater than 200s.   - Endocrine follow up: can f/u with Dr. Grace, Endocrinology.   - Needs Optho/ renal/cardiac /podiatry and vascular follow up

## 2025-03-20 NOTE — PROGRESS NOTE ADULT - ASSESSMENT
64y Female with history of CHF presents as a transfer for LE CO2 angiogram. Nephrology consulted for elevated Scr.    1) JAMEL: likely due to ATN for which patient initiated on RRT on 2/20. Last HD on 3/14. JAMEL now resolved. No need for additional HD at this time. S/P TDC placement on 3/11. Plan for TDC remvoval on 3/24. S/P stiles for urinary retention. Avoid nephrotoxins.    2) HTN: BP low normal. Continue with current medications.    3) LE edema: Continue with lasix 40 mg PO daily. Started on aldactone 25 mg daily this morning. If UO suboptimal, will change lasix to torsemide 20 mg daily. TTE with severely decreased LVSF. Monitor UO.     4) Anemia: Hb acceptable with low TSAT. No IV iron given elevated ferritin. S/P Epo 8K X 1 dose 3/12. Will give another dose if Hb decreases. Monitor Hb.    5) Hyponatremia: In the setting of hyperglycemia and volume overload. Continue with 1L [facility] NEPHROLOGY  Raji Waterman M.D.  Mars Feliz D.O.  Kelley Parks M.D.  MD Nancy Kulkarni, MSN, ANP-C    Telephone: [phone]  Facsimile: [phone] [place] [address], [address]  [place] [address]

## 2025-03-20 NOTE — PROGRESS NOTE ADULT - ASSESSMENT
64F, hx of CHF (last TTE on 1/16/25 EF 30-35%, G2DD), DM, diabetic foot ulcer with a recent admission at [facility] for CHF exacerbation 1/14-1/17 p/w worsening R. leg pain and fluid secretion, was meeting sepsis criteria with podiatry having low suspicion for right cellulitis and admitted for continued management of HF exacerbation and needing ischemic eval.     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  -Left leg Arterial Duplex: Slightly tardus parvus waveform of the left popliteal artery is noted, for which underlying disease cannot be excluded. Posterior tibial artery waveform is nonpulsatile. Anterior tibial artery tardus parvus flow is noted.   Transferred to [facility] for CO2 angiogram as per vascular surgery    #  PAD Wound of lower extremity.   ·  Plan: Podiatry following, b/l serous bullae, no concerns for infection  Found to have RLE coolness  -Right Leg Arterial Duplex: Popliteal artery is occluded with negligible flow of right trifurcation arteries.  -Left leg Arterial Duplex: Slightly tardus parvus waveform of the left popliteal artery is noted, for which underlying disease cannot be excluded. Posterior tibial artery waveform is nonpulsatile. Anterior tibial artery tardus parvus flow is noted.  -Started on heparin gtt and dobutamine gtt -Will transfer to [facility] for CO2 angiogram as per vascular surgery as not candidate for CTA due to worsening SCr  -Keep compression dressing  -LE elevation above heart level at rest.  -Transferred to [facility] for CO2 angiogram   - Overall this patient is at   intermediate  risk (for cardiac death, nonfatal myocardial infarction, and nonfatal cardiac arrest perioperatively for this intermediate  risk procedure).   No cardiac contraindications for CO2 vangiogram  There  are  no further recommendation for risk stratifying imaging/stress testing prior to planned surgery  Seen by Podiatry-No acute pod intervention, local wound care only-   PAD with rest ischemia  s/p AT/Popliteal angioplasty on DAPT  - RLE extensive gangrnous changes to foot dorsum, ankle, heel dorsomedial and lateral lower leg, ischemic changes to 2nd digit and hallux,  Left foot hallux distal tuft well adhered eschar, no acute signs of infection.   -Seen by vascular Plan for AKA vs BKA   Vascular planning for Right AKA Discussed with Vascular she is as in optimal cardiac condition as possible to undergo surgery under [facility]  Her HF is compensated and has had PCI  - Overall this patient is at   intermediate to high but not prohobitive risk (for cardiac death, nonfatal myocardial infarction, and nonfatal cardiac arrest perioperatively for this intermediate risk procedure).     The patient is however without evidence of ACS, Decompensated Heart Failure,Obstructive Valvular Heart disease or Unstable arrhythmia.   There  are  no further recommendation for risk stratifying imaging/stress testing prior to planned surgery  She is in as optimal condition as possible for the procedure      # HFrEF (congestive heart failure). Ischemic Cardiomyopathy  ·  Plan: Hx of HF, previous admission in January, on home Lasix 40 qD, Metoprolol Succ 25 qD. Not on Entresto due to insurance issues  Last TTE: LVSF moderately decreased w/ EF 30-35 %. Moderate G2DD. Mild MR  Stress test: small-sized, moderate defect(s) in the apical wall that is predominantly fixed suggestive of an infarction with minimal marcus-infarct ischemia  Ischemic cardiomyopathy  GDMT on hold 2/2 JAMEL  Repeat TTE EF 20% with WMA RAP~~8  Repeat Limited TTE  Taper off Milrinone and start GDMT  Is currently off Hydral/Isdn and Bumex 2/2 soft BP  Continue HD with UF had 2900mL removed yesterday will stop Milrinone and observe  Started  low dose BBlocker Metoprolol tartate 12.5 mg PO q8  03/10-Off Midodrine will reattempt Hyd/Isdn  03/11-Daily HD/UF   03/12-Awake s/p Tunelled HD catheter-Plan for Discharge to [facility]  03/13-EP evaluation Arias episodes  03/14-Increasing necrosis noted Right LE Vascular planning for Right AKA   03/15-Right AKAvs BKA on Monday 03/16- Awake Sinus Rhythm clinically stable for OR tomorrow  03/17-Somnolent not dyspneac for OR today  03/19-POD#2  R AKA No dyspnea BP stable   03/20-POD#3 Stable start GDMT for HF Limited TTE          # CAD  LHC-RCA , severe ostial LM disease, diffuse disease in LAD and LCx, some L to R collaterals-seen by CTS advise cMR for viability poor target vessels for CABG-?High risk LM/LAD PCI  Had cMR-LVEF is 25%, greater than 75% subendocardial scarring involving the basal to mid inferior wall of the left ventricle, left ventricular transmural scarring involving the apical septal wall and likely near transmural scarring of the apical lateral wall. 50% scarring of the left ventricular basal inferoseptal wall.  03/05-s/p PCI-LM/LAD   Continue  uninterrupted DAPT        # JAMEL on CKD   Creatinine Trend: 0.99<--, 1.09<--1.47 <--1.80<--2.99 <--3.61 <--3.08<-- 4.40 <--3.84<--, 3.05<--, 4.46<--, 3.63<--, 2.35<--, 2.36<---3.38<--(HD)-- 4.76<--4.07<---3.90<-- 1.17  Has had 3 sessions HD for interrmittent HD to allow contrast based procedures is non oliguric  Plan to continue HD likely will need extended RRT  Srini molina on 03/11  Permacath  Been off dialysis since 03/17  Renal function improved

## 2025-03-20 NOTE — PROGRESS NOTE ADULT - SUBJECTIVE AND OBJECTIVE BOX
SURGERY DAILY PROGRESS NOTE    24 Hour/Overnight Events: No acute events overnight    SUBJECTIVE: Patient seen and evaluated on AM rounds.   ------------------------------------------------------------------------------------------------------------  OBJECTIVE:  Vital Signs Last 24 Hrs  T(C): 36.7 (20 Mar 2025 05:27), Max: 37.1 (19 Mar 2025 21:06)  T(F): 98.1 (20 Mar 2025 05:27), Max: 98.7 (19 Mar 2025 21:06)  HR: 102 (20 Mar 2025 05:27) (98 - 110)  BP: 108/63 (20 Mar 2025 05:27) (90/53 - 108/63)  BP(mean): --  RR: 18 (20 Mar 2025 05:27) (18 - 18)  SpO2: 91% (20 Mar 2025 05:27) (90% - 93%)    Parameters below as of 20 Mar 2025 05:27  Patient On (Oxygen Delivery Method): room air      I&O's Detail    19 Mar 2025 07:01  -  20 Mar 2025 07:00  --------------------------------------------------------  IN:    Oral Fluid: 360 mL  Total IN: 360 mL    OUT:    Indwelling Catheter - Urethral (mL): 2000 mL  Total OUT: 2000 mL    Total NET: -1640 mL          LABS:                        9.7    10.73 )-----------( 194      ( 20 Mar 2025 07:09 )             28.5     03-20    131[L]  |  96  |  25[H]  ----------------------------<  263[H]  4.5   |  23  |  1.06    Ca    8.1[L]      20 Mar 2025 07:09  Phos  1.9     03-20  Mg     1.8     03-20          Urinalysis Basic - ( 20 Mar 2025 07:09 )    Color: x / Appearance: x / SG: x / pH: x  Gluc: 263 mg/dL / Ketone: x  / Bili: x / Urobili: x   Blood: x / Protein: x / Nitrite: x   Leuk Esterase: x / RBC: x / WBC x   Sq Epi: x / Non Sq Epi: x / Bacteria: x    PE:  Gen: NAD  CV: Pulse regular present  Resp: Nonlabored  Abdomen: Soft, nontender  Extremities: R AKA dressing c/d/i, R brach site soft and c/d/i

## 2025-03-20 NOTE — PROVIDER CONTACT NOTE (OTHER) - SITUATION
Pt had a nose bleed is on Heparin gt
pt complained of 8/10 pain after hydromorphone was given 1 hour prior
Bladder xsmj=182
pt complaining of 10/10 b/l LE
Bladder scan 589 mL
Patient c/o blurry vision on admission
Pt hypotensive with bp 86/47, asymptomatic.
Pt was regurgitating tube feed, had increased work of breathing, was trying to cough.
as per tele, pt has a big change in HR from baseline 100s to 48s. Patient sustaining Sinus Bardy 48 for 3 mins on tele
Tachycardia 120's
PAF with HR 220s for3s
Pt on TOV, performed bladder scan
Patient requesting to have most if not all medications moved to night time, [facility]; requesting bedside; patient was told via "doctor" bedside OK'd

## 2025-03-21 LAB
ANION GAP SERPL CALC-SCNC: 13 MMOL/L — SIGNIFICANT CHANGE UP (ref 5–17)
BUN SERPL-MCNC: 25 MG/DL — HIGH (ref 7–23)
CALCIUM SERPL-MCNC: 8 MG/DL — LOW (ref 8.4–10.5)
CHLORIDE SERPL-SCNC: 94 MMOL/L — LOW (ref 96–108)
CO2 SERPL-SCNC: 24 MMOL/L — SIGNIFICANT CHANGE UP (ref 22–31)
CREAT SERPL-MCNC: 1.03 MG/DL — SIGNIFICANT CHANGE UP (ref 0.5–1.3)
EGFR: 61 ML/MIN/1.73M2 — SIGNIFICANT CHANGE UP
EGFR: 61 ML/MIN/1.73M2 — SIGNIFICANT CHANGE UP
GLUCOSE BLDC GLUCOMTR-MCNC: 133 MG/DL — HIGH (ref 70–99)
GLUCOSE BLDC GLUCOMTR-MCNC: 145 MG/DL — HIGH (ref 70–99)
GLUCOSE BLDC GLUCOMTR-MCNC: 178 MG/DL — HIGH (ref 70–99)
GLUCOSE BLDC GLUCOMTR-MCNC: 203 MG/DL — HIGH (ref 70–99)
GLUCOSE SERPL-MCNC: 154 MG/DL — HIGH (ref 70–99)
HCT VFR BLD CALC: 30.1 % — LOW (ref 34.5–45)
HGB BLD-MCNC: 10 G/DL — LOW (ref 11.5–15.5)
MCHC RBC-ENTMCNC: 24.9 PG — LOW (ref 27–34)
MCHC RBC-ENTMCNC: 33.2 G/DL — SIGNIFICANT CHANGE UP (ref 32–36)
MCV RBC AUTO: 75.1 FL — LOW (ref 80–100)
NRBC BLD AUTO-RTO: 0 /100 WBCS — SIGNIFICANT CHANGE UP (ref 0–0)
PHOSPHATE SERPL-MCNC: 2.9 MG/DL — SIGNIFICANT CHANGE UP (ref 2.5–4.5)
PLATELET # BLD AUTO: 195 K/UL — SIGNIFICANT CHANGE UP (ref 150–400)
POTASSIUM SERPL-MCNC: 4.7 MMOL/L — SIGNIFICANT CHANGE UP (ref 3.5–5.3)
POTASSIUM SERPL-SCNC: 4.7 MMOL/L — SIGNIFICANT CHANGE UP (ref 3.5–5.3)
RBC # BLD: 4.01 M/UL — SIGNIFICANT CHANGE UP (ref 3.8–5.2)
RBC # FLD: 22.4 % — HIGH (ref 10.3–14.5)
SODIUM SERPL-SCNC: 131 MMOL/L — LOW (ref 135–145)
WBC # BLD: 10.92 K/UL — HIGH (ref 3.8–10.5)
WBC # FLD AUTO: 10.92 K/UL — HIGH (ref 3.8–10.5)

## 2025-03-21 PROCEDURE — 93926 LOWER EXTREMITY STUDY: CPT | Mod: 26,LT

## 2025-03-21 RX ADMIN — GABAPENTIN 300 MILLIGRAM(S): 400 CAPSULE ORAL at 21:23

## 2025-03-21 RX ADMIN — OXYCODONE HYDROCHLORIDE 5 MILLIGRAM(S): 30 TABLET ORAL at 14:36

## 2025-03-21 RX ADMIN — OXYCODONE HYDROCHLORIDE 5 MILLIGRAM(S): 30 TABLET ORAL at 10:51

## 2025-03-21 RX ADMIN — HEPARIN SODIUM 5000 UNIT(S): 1000 INJECTION INTRAVENOUS; SUBCUTANEOUS at 18:00

## 2025-03-21 RX ADMIN — OXYCODONE HYDROCHLORIDE 5 MILLIGRAM(S): 30 TABLET ORAL at 15:06

## 2025-03-21 RX ADMIN — Medication 975 MILLIGRAM(S): at 22:20

## 2025-03-21 RX ADMIN — METHOCARBAMOL 750 MILLIGRAM(S): 500 TABLET, FILM COATED ORAL at 17:59

## 2025-03-21 RX ADMIN — Medication 81 MILLIGRAM(S): at 12:31

## 2025-03-21 RX ADMIN — INSULIN LISPRO 10 UNIT(S): 100 INJECTION, SOLUTION INTRAVENOUS; SUBCUTANEOUS at 12:30

## 2025-03-21 RX ADMIN — Medication 5 MILLIGRAM(S): at 05:37

## 2025-03-21 RX ADMIN — CLOPIDOGREL BISULFATE 75 MILLIGRAM(S): 75 TABLET, FILM COATED ORAL at 12:30

## 2025-03-21 RX ADMIN — Medication 975 MILLIGRAM(S): at 13:58

## 2025-03-21 RX ADMIN — INSULIN LISPRO 10 UNIT(S): 100 INJECTION, SOLUTION INTRAVENOUS; SUBCUTANEOUS at 08:43

## 2025-03-21 RX ADMIN — Medication 975 MILLIGRAM(S): at 05:36

## 2025-03-21 RX ADMIN — FUROSEMIDE 40 MILLIGRAM(S): 10 INJECTION INTRAMUSCULAR; INTRAVENOUS at 05:37

## 2025-03-21 RX ADMIN — HEPARIN SODIUM 5000 UNIT(S): 1000 INJECTION INTRAVENOUS; SUBCUTANEOUS at 05:36

## 2025-03-21 RX ADMIN — GABAPENTIN 300 MILLIGRAM(S): 400 CAPSULE ORAL at 05:37

## 2025-03-21 RX ADMIN — Medication 975 MILLIGRAM(S): at 14:28

## 2025-03-21 RX ADMIN — Medication 1 APPLICATION(S): at 05:38

## 2025-03-21 RX ADMIN — OXYCODONE HYDROCHLORIDE 5 MILLIGRAM(S): 30 TABLET ORAL at 20:06

## 2025-03-21 RX ADMIN — ATORVASTATIN CALCIUM 40 MILLIGRAM(S): 80 TABLET, FILM COATED ORAL at 21:23

## 2025-03-21 RX ADMIN — OXYCODONE HYDROCHLORIDE 5 MILLIGRAM(S): 30 TABLET ORAL at 10:21

## 2025-03-21 RX ADMIN — Medication 25 MILLIGRAM(S): at 05:37

## 2025-03-21 RX ADMIN — Medication 975 MILLIGRAM(S): at 06:36

## 2025-03-21 RX ADMIN — METHOCARBAMOL 750 MILLIGRAM(S): 500 TABLET, FILM COATED ORAL at 12:31

## 2025-03-21 RX ADMIN — Medication 975 MILLIGRAM(S): at 21:22

## 2025-03-21 RX ADMIN — Medication 1 TABLET(S): at 12:30

## 2025-03-21 RX ADMIN — INSULIN LISPRO 1: 100 INJECTION, SOLUTION INTRAVENOUS; SUBCUTANEOUS at 12:29

## 2025-03-21 RX ADMIN — METHOCARBAMOL 750 MILLIGRAM(S): 500 TABLET, FILM COATED ORAL at 05:37

## 2025-03-21 RX ADMIN — INSULIN LISPRO 10 UNIT(S): 100 INJECTION, SOLUTION INTRAVENOUS; SUBCUTANEOUS at 17:58

## 2025-03-21 RX ADMIN — LISINOPRIL 2.5 MILLIGRAM(S): 5 TABLET ORAL at 21:22

## 2025-03-21 RX ADMIN — GABAPENTIN 300 MILLIGRAM(S): 400 CAPSULE ORAL at 13:58

## 2025-03-21 RX ADMIN — INSULIN GLARGINE-YFGN 18 UNIT(S): 100 INJECTION, SOLUTION SUBCUTANEOUS at 22:16

## 2025-03-21 RX ADMIN — OXYCODONE HYDROCHLORIDE 5 MILLIGRAM(S): 30 TABLET ORAL at 02:12

## 2025-03-21 RX ADMIN — OXYCODONE HYDROCHLORIDE 5 MILLIGRAM(S): 30 TABLET ORAL at 03:12

## 2025-03-21 RX ADMIN — INSULIN LISPRO 2: 100 INJECTION, SOLUTION INTRAVENOUS; SUBCUTANEOUS at 08:42

## 2025-03-21 RX ADMIN — Medication 5 MILLIGRAM(S): at 18:00

## 2025-03-21 RX ADMIN — Medication 40 MILLIGRAM(S): at 12:29

## 2025-03-21 RX ADMIN — OXYCODONE HYDROCHLORIDE 5 MILLIGRAM(S): 30 TABLET ORAL at 22:21

## 2025-03-21 RX ADMIN — Medication 2 TABLET(S): at 21:22

## 2025-03-21 NOTE — PROGRESS NOTE ADULT - PROBLEM SELECTOR PLAN 1
-Primarily with exertion & when laying flat. Pt denies SOB at rest.  -Suspect 2nd to fluid overload, underlying CHF  -Keep O>I as tolerated  -VBG 2/14 acceptable  -S/p ABX per primary team for LE wounds  -CTA chest with b/l pl effusions R>L, congestion, negative for PE  -Keep sats >90% with O2 PRN (currently RA).  -SOB and cough improved  -Incentive spirometry use encouraged  -CXR 3/20 with mild congestion.

## 2025-03-21 NOTE — PROGRESS NOTE ADULT - ASSESSMENT
63 YO F with PMHx of HFrEF 30-35 and grade 2 ddfxn (last TTE on 01/16/25), DM2, and diabetic foot ulcer. Recent admission at [facility] for ADHF. Patient now represents to OSH and ultimately to [facility] as a transfer for worsening right leg pain and fluid secretion. As per documentation, patient was reported to have severe depletion of RLE blood flow beyond the popliteal vessel at knee and similar findings in the left. Patient was transferred to [facility] for CO2 angiogram with Dr. Baltazar. Of note, patient also with reported visual disturbance for which patient was seen and evaluated by opthalmology. Internal Medicine has been consulted on Ms. Kirby's care for medical management.       # Foot ulcers with worsening RLE pain second to pop artery occlusion +/- diabetic ulcers   - RLE Arterial Duplex with popliteal artery occlusion   - LLE Arterial Duplex with underlying disease cannot be excluded   - s/p angio with pop and AT VERA on 2/24   - c/b necrosis of RLE   - S/P AKA 3/17   - Continue on Lipitor and DAPT  - Continue pain control with oxy  - Wound care called for dressing recs   - Pain management       # ADHF/ BiV HFrEF 20   - Recent admission at [facility] for ADHF and on dobutamine outpatient  - TTE 1/16 with EF 30-35 with moderately reduced LVSF and grade 2 ddfxn, normal RVSF , trace TR/ AZ, and trace pericardial effusion  - RPT TTE with EF 20, severely decreased LV, decreased RVSF with TAPSE 1.2, and regional wall motion abnormalities present with entire septum, entire apex, entire inferior wall, mid inferolateral segment, and mid anterolateral segment are hypokinetic.   - RHC with RA 20, PA 49/29 (40), PCWP 35, mVO2 51.1, CO/CI 3.8/2.2, SVR 1095 w/ Multivessel CAD   - s/p zaroxyln 10 QD to augment diuresis   - s/p bumex GTT   - s/p HTS to augment diuresis   - RPT limited TTE w/ LVSF severely decreased, reduced RVSF, LVOT VTI 12, mild to mod TR, and mildly elevated right atrial pressure  - s/p bumex 4 IV QD, but became anuric and dc'ed   - RPT ECHO post cath with EF 28 %, regional WMA, multiple segmental abnormalities exist, and reduced RVSF    - Case discussed with HF and cards and monitoring off milrinone GTT  - JAMEL as below resolved and urine output improved   - RPT ECHO with EF 31 with severely decreased LVSD and reduced RVSF   - Continue on lasix 40 and aldactone  - Continue on lisinopril 2.5 and toprol 25 for GDMT   - Case discussed with EP and needs to be on GDMT for 3 months before AICD placement and should be stabilized off of inotropic support.    - Monitor I and O   - Monitor volume status   - Check daily weights    - Monitor on telemetry  - Monitor electrolytes   - Cardio, HF, Renal, and EP evals appreciated; F/u recs     # Bradycardia   - SB to 48 BPM at 0400 while asleep on 3/11 and likely related to BB , however last dose prior to event was 3/10 at 0600.  - Resume on BB as above   - Monitor telemetry  - EP eval appreciated; F/u recs     # Tachycardia and Hypoxia  - Tachycardiac and on RA with SPO2 running 90-92   - Could be second to low cardiac output   - CTA with no PE  - Duplex negative for DVT   - On Toprol XL 25 PO QD.   - Monitor HR   - Monitor closely on Tele  - Cardio eval appreciated; F/u recs    # JAMEL with Hyponatremia and Metabolic Acidosis   - Baseline CRE 0.7-1.2 and increased to ~4  - As per OSH documentation, JAMEL was thought to be second to Unasyn/ Bumex / Entresto use and Hypotension   - US RENAL with no hydronephrosis  - Likely cardiorenal induced with low flow state in ADHF, however now rising again and concern for milrinone vs overdiuresis (prerenal) vs cardiorenal.   - Milrinone adjusted and diuresis turned off, however no improvement/ worsened in renal function noted.   - s/p shiley placement and started on HD 2/20  - s/p permacath 3/11  - Passed TOV  - Urine output improved and JAMEL resolved  - Last HD session 3/14  - Plan for permacath removal on 3/24  - Avoid nephrotoxic agents  - Monitor Cr and daily BMP   - Renal and HF evals appreciated; F/u recs    # CAD with TVD   - NST abnormal with small-sized, moderate defect in apical wall that is predominantly fixed suggestive of an infarction with minimal marcus-infarct ischemia. Post stress LVEF 25.  - s/p LHC with RCA , severe ostial LM disease, diffuse disease in LAD and LCx, some L to R collaterals (Multivessel CAD)  - Case discussed with CTSx and NOT a candidate for CABG due to comorbidities  - Cardiac MRI with greater than 75% subendocardial scarring of the basal to mid-inferior wall of the LV and 50% scarring of the left ventricular basal inferoseptal wall. There is also left ventricular transmural scarring involving the apical septal wall and likely near transmural scarring of the apical lateral wall.  - s/p RPT LHC 3/4 with LM 80 s/p POBA/ VERA with LM 1, pLAD 90 s/p POBA/ VERA with pLAD 1, and Cx 80 s/p POBA with Cx 10.   - DAPT and Statin per cardiology   - IC, Cards and HF following     # BL LE Edema likely in setting of ADHF  - Remove volume as per Cardio, HF and Renal   - BL LE elevation and compression  - LE Duplex neg   - Monitor for now    # Pericardial effusion likely in setting ADHF  - TTE with trace pericardial effusion   - CT Chest with small pericardial effusion   - Denies chest pain, palpitations, chest tightness or discomfort, shortness of breath or dyspnea   - Repeat serial TTE for further evaluation   - Diuresis per cardio/ renal.  - Monitor on telemetry  - Cardio following    # Pleural effusion likely in setting ADHF  - CT Chest with small BL pleural effusions  - Monitor O2 saturation  - Supplement to maintain > 90%   - Diuresis / HD per cardio/ renal.     # Hypernatremia to hyponatremia  - Hypernatremia likely from HTS vs over diuresis/ intravascular dehydration. HTS and diuresis stopped with improved sodium   - Hyponatremia now noted second to volume overload   - Avoid overcorrection > 6-8 mEq in 24 hours  - Monitor sodium with HD    # Transaminitis  - Likely 2/2 hepatic congestion   - Volume removal with HD as tolerated  - Trend LFTs, if uptrend post-HD initiation, check ABD US  - Serial ABD Examinations    # Leukocytosis likely in setting of BL LE ulcers with pop occlusion   - BCx negative   - UCx with probable contamination   - S/P unasyn for ABX.   - Leukocytosis fluctuating, however appears nontoxic with no fever spikes and monitoring closely off ABX  - If febrile check pan cultures   - Trend CBC, temp curve, VS and adjust as tolerated      # Visual Disturbance  - CT Head w/ old infarcts  - On ASA and Statin   - Opthalmology eval appreciated    # Indigestion and Constipation   - PPI, miralax and senna continued  - Monitor BMs    # Anemia likely mixed AOCD vs iron deficiency   - HH stable in 9s on HSQ  - Anemia panel with AOCD   - Started on venofer, but ferritin high and now stopped   - Continue on EPO with HD per renal  - Monitor HH   - Transfuse for Hgb < 8  - Maintain active T/S    # Diabetes Mellitus A1C 11.6   - Continue on lantus 13 with lispro 5 and ISS   - Diabetic DASH diet   - Monitor and adjust glucose levels PRN   - Endocrine following; F/u recs     # HLD and HLD  - Continue on lipitor and toprol  - Monitor BP    # DISPO TBD  - Palliative care called and patient remains FULL CODE     Discussed with Attending and ACP

## 2025-03-21 NOTE — CHART NOTE - NSCHARTNOTEFT_GEN_A_CORE
TOMASZ ALBA    Notified by vascular technologist of [facility] arterial US results:     Moderate stenosis of the superficial femoral artery in the mid thigh.  High-grade stenosis of the anterior tibial artery in the upper calf.  Segmental occlusion of the mid and distal posterior tibial artery.      Interventions taken: Finding d/w vascular, no in-pt intervention. Pt to follow up out-pt with Dr. Giovanny Gaitan NP  [id number]

## 2025-03-21 NOTE — PROGRESS NOTE ADULT - SUBJECTIVE AND OBJECTIVE BOX
General Surgery Progress Note    Overnight: ADAM  Subjective: Patient seen and examined on rounds.    Objective:  Vitals:  T(C): 36.6 (03-21-25 @ 08:40), Max: 36.9 (03-20-25 @ 21:41)  HR: 98 (03-21-25 @ 08:40) (98 - 104)  BP: 95/63 (03-21-25 @ 08:40) (95/60 - 102/62)  RR: 18 (03-21-25 @ 08:40) (18 - 18)  SpO2: 93% (03-21-25 @ 08:40) (92% - 93%)  Wt(kg): --    03-20 @ 07:01  -  03-21 @ 07:00  --------------------------------------------------------  IN:    Oral Fluid: 200 mL  Total IN: 200 mL    OUT:    Intermittent Catheterization - Urethral (mL): 640 mL  Total OUT: 640 mL    Total NET: -440 mL          Physical Exam:  General Appearance: no acute distress, NTND   Chest: airway intact, non-labored breathing   Extremities: WWP, R BKA stump c/d/i with intact staple line       Labs:                        10.0   10.92 )-----------( 195      ( 21 Mar 2025 05:51 )             30.1     03-21    131[L]  |  94[L]  |  25[H]  ----------------------------<  154[H]  4.7   |  24  |  1.03    Ca    8.0[L]      21 Mar 2025 05:51  Phos  2.9     03-21  Mg     1.8     03-20          Urinalysis Basic - ( 21 Mar 2025 05:51 )    Color: x / Appearance: x / SG: x / pH: x  Gluc: 154 mg/dL / Ketone: x  / Bili: x / Urobili: x   Blood: x / Protein: x / Nitrite: x   Leuk Esterase: x / RBC: x / WBC x   Sq Epi: x / Non Sq Epi: x / Bacteria: x

## 2025-03-21 NOTE — PROGRESS NOTE ADULT - ASSESSMENT
64y Female with history of CHF presents as a transfer for LE CO2 angiogram. Nephrology consulted for elevated Scr.    1) JAMEL: likely due to ATN for which patient initiated on RRT on 2/20. Last HD on 3/14. JAMEL now resolved. No need for additional HD at this time. S/P TDC placement on 3/11. Plan for TDC remvoval on 3/24. S/P stiles for urinary retention. Avoid nephrotoxins.    2) HTN: BP low normal. Continue with current medications.    3) LE edema: Continue with lasix 40 mg PO daily. Started on aldactone 25 mg daily on 3/20, c/w medication and monitor K+ levels. If UO suboptimal, will change lasix to torsemide 20 mg daily. TTE with severely decreased LVSF. Monitor UO.     4) Anemia: Hb acceptable with low TSAT. No IV iron given elevated ferritin. S/P Epo 8K X 1 dose 3/12. Will give another dose if Hb decreases. Monitor Hb.    5) Hyponatremia: In the setting of hyperglycemia and volume overload. Continue with [address] [place] [address] NEPHROLOGY  Raji Waterman M.D.  Mars Feliz D.O.  Kelley Parks M.D.  MD Nancy Kulkarni, MSN, ANP-C    Telephone: [phone]  Facsimile: [phone] [place] [address], [address]  [place] [address]

## 2025-03-21 NOTE — PROGRESS NOTE ADULT - SUBJECTIVE AND OBJECTIVE BOX
MR#[id number]  PATIENT NAME:COLE ALBA    DATE OF SERVICE: 03-21-25 @ 06:52  Patient was seen and examined by Yordy Gilmore MD on    03-21-25 @ 06:52 .  Interim events noted.Consultant notes ,Labs,Telemetry reviewed by me       HOSPITAL COURSE: HPI:  64F, hx of CHF (last TTE on 1/16/25 EF 30-35%, G2DD), DM, diabetic foot ulcer with a recent admission at [facility] for CHF exacerbation presented as a transfer for worsening R. leg pain and fluid secretion, On non-invasive imaging demonstrating severe depletion of RLE blood flow beyond the popliteal vessel at knee and similar findings in L. Was transferred to [facility] for CO2 angiogram with Dr. Baltazar. (29 Jan 2025 20:05)      INTERIM EVENTS:Patient seen at bedside ,interim events noted.  03/05-Awake s/p LHC VERA x 1 to LAD, VERA to LM , POBA to CX via RFA  03/18-POD#1 Right AKA  03/19-Awake alert pain controlled-no dyspnea Sinus Rhythm  03/20-Awake afebrile no dyspnea started on low dose ACE    PMH -reviewed admission note, no change since admission  HEART FAILURE: Acute[ x]Chronic[ ] Systolic[x ] Diastolic[ ] Combined Systolic and Diastolic[ ]  CAD[x ] CABG[ ] PCI[x ]  DEVICES[ ] PPM[ ] ICD[ ] ILR[ ]  ATRIAL FIBRILLATION[ ] Paroxysmal[ ] Permanent[ ] CHADS2-[  ]  JAMEL[ ] CKD1[ ] CKD2[ ] CKD3[ ] CKD4[ ] ESRD[ ]  COPD[ ] HTN[x ]   DM[x ] Type1[ ] Type 2[x ]   CVA[ ] Paresis[ ]    AMBULATION: Assisted[x ] Cane/walker[ ] Independent[ ]    MEDICATIONS  (STANDING):  acetaminophen     Tablet .. 975 milliGRAM(s) Oral every 8 hours  aspirin enteric coated 81 milliGRAM(s) Oral daily  atorvastatin 40 milliGRAM(s) Oral at bedtime  bisacodyl 5 milliGRAM(s) Oral every 12 hours  chlorhexidine 4% Liquid 1 Application(s) Topical <User Schedule>  clopidogrel Tablet 75 milliGRAM(s) Oral daily  furosemide    Tablet 40 milliGRAM(s) Oral daily  gabapentin 300 milliGRAM(s) Oral every 8 hours  glucagon  Injectable 1 milliGRAM(s) IntraMuscular once  heparin   Injectable 5000 Unit(s) SubCutaneous every 12 hours  insulin glargine Injectable (LANTUS) 18 Unit(s) SubCutaneous at bedtime  insulin lispro (ADMELOG) corrective regimen sliding scale   SubCutaneous three times a day before meals  insulin lispro (ADMELOG) corrective regimen sliding scale   SubCutaneous at bedtime  insulin lispro Injectable (ADMELOG) 10 Unit(s) SubCutaneous three times a day with meals  lisinopril 2.5 milliGRAM(s) Oral at bedtime  methocarbamol 750 milliGRAM(s) Oral every 6 hours  metoprolol succinate ER 25 milliGRAM(s) Oral daily  Nephro-rober 1 Tablet(s) Oral daily  pantoprazole  Injectable 40 milliGRAM(s) IV Push daily  polyethylene glycol 3350 17 Gram(s) Oral daily  senna 2 Tablet(s) Oral at bedtime  spironolactone 25 milliGRAM(s) Oral daily    MEDICATIONS  (PRN):  ALPRAZolam 0.25 milliGRAM(s) Oral daily PRN anxiety  dextrose Oral Gel 15 Gram(s) Oral once PRN Blood Glucose LESS THAN 70 milliGRAM(s)/deciliter  melatonin 3 milliGRAM(s) Oral at bedtime PRN Insomnia  ondansetron Injectable 4 milliGRAM(s) IV Push every 6 hours PRN Nausea and/or Vomiting  oxyCODONE    IR 5 milliGRAM(s) Oral every 4 hours PRN Severe Pain (7 - 10)  sodium chloride 0.65% Nasal 1 Spray(s) Both Nostrils three times a day PRN Dryness  sodium chloride 0.9% lock flush 10 milliLiter(s) IV Push every 1 hour PRN Pre/post blood products, medications, blood draw, and to maintain line patency            REVIEW OF SYSTEMS:  Constitutional: [ ] fever, [ ]weight loss,  [x ]fatigue [ ]weight gain  Eyes: [ ] visual changes  Respiratory: [ ]shortness of breath;  [ ] cough, [ ]wheezing, [ ]chills, [ ]hemoptysis  Cardiovascular: [ ] chest pain, [ ]palpitations, [ ]dizziness,  [ ]leg swelling[ ]orthopnea[ ]PND  Gastrointestinal: [ ] abdominal pain, [ ]nausea, [ ]vomiting,  [ ]diarrhea [ ]Constipation [ ]Melena  Genitourinary: [ ] dysuria, [ ] hematuria [ ][place]  Neurologic: [ ] headaches [ ] tremors[ ]weakness [ ]Paralysis Right[ ] Left[ ]  Skin: [ ] itching, [ ]burning, [ ] rashes  Endocrine: [ ] heat or cold intolerance  Musculoskeletal: [ ] joint pain or swelling; [ ] muscle, back, or extremity pain  Psychiatric: [ ] depression, [ ]anxiety, [ ]mood swings, or [ ]difficulty sleeping  Hematologic: [ ] easy bruising, [ ] bleeding gums    [ ] All remaining systems negative except as per above.   [ ]Unable to obtain.  [x] No change in ROS since admission      Vital Signs Last 24 Hrs  T(C): 36.8 (21 Mar 2025 04:24), Max: 36.9 (20 Mar 2025 21:41)  T(F): 98.2 (21 Mar 2025 04:24), Max: 98.4 (20 Mar 2025 21:41)  HR: 99 (21 Mar 2025 04:24) (97 - 104)  BP: 101/69 (21 Mar 2025 04:24) (94/60 - 102/62)  RR: 18 (21 Mar 2025 04:24) (18 - 18)  SpO2: 92% (21 Mar 2025 04:24) (92% - 93%)    Parameters below as of 21 Mar 2025 04:24  Patient On (Oxygen Delivery Method): room air      I&O's Summary    19 Mar 2025 07:01  -  20 Mar 2025 07:00  --------------------------------------------------------  IN: 360 mL / OUT: 2000 mL / NET: -1640 mL    20 Mar 2025 07:01  -  21 Mar 2025 06:52  --------------------------------------------------------  IN: 200 mL / OUT: 640 mL / NET: -440 mL        PHYSICAL EXAM:  General: No acute distress BMI-  HEENT: EOMI, PERRL  Neck: Supple, [ ] JVD  Lungs: Equal air entry bilaterally; [ ] rales [ ] wheezing [ ] rhonchi  Heart: Regular rate and rhythm; [x ] murmur   2/6 [ x] systolic [ ] diastolic [ ] radiation[ ] rubs [ ]  gallops  Abdomen: Nontender, bowel sounds present  Extremities: No clubbing, cyanosis, [ ] edema [x ]Right AKA   Nervous system:  Alert & Oriented X3, no focal deficits  Psychiatric: Normal affect  Skin: No rashes or lesions    LABS:  03-21    131[L]  |  94[L]  |  25[H]  ----------------------------<  154[H]  4.7   |  24  |  1.03    Ca    8.0[L]      21 Mar 2025 05:51  Phos  2.9     03-21  Mg     1.8     03-20      Creatinine Trend: 1.03<--, 1.06<--, 0.99<--, 1.09<--, 1.47<--, 1.80<--                        10.0   10.92 )-----------( 195      ( 21 Mar 2025 05:51 )             30.1       TTE Limited W or WO Ultrasound Enhancing Agent (03.20.25 @ 17:08) >  CONCLUSIONS:      1. Left ventricular systolic function is severely decreased with an ejection fraction of 31 % by Winchester's method of disks.   2. Multiple segmental abnormalities exist. See findings.   3. Normal right ventricular cavity size and reduced right ventricular systolic function.   4. No pericardial effusion seen.   5. Compared to the transthoracic echocardiogram performed on 3/10/2025, there havebeen no significant interval changes.   6. There is no evidence of a left ventricular thrombus.   7. There is normal LV mass and normal geometry.    12 Lead ECG (03.11.25 @ 04:35) >  SINUS RHYTHM WITHMARKED SINUS ARRHYTHMIA  POSSIBLE ANTERIOR INFARCT , AGE UNDETERMINED  ABNORMAL ECG        Cardiac Catheterization (03.04.25 @ 09:07) >  Conclusions:   Impella placed for HR-PCI (pt was turned down for CABG). EF 25%     Severe proximal LCx stenosis s/p successful rotational atherectomy and balloon angioplasty.   Severe LM and proximal LAD stenosis s/p successful rotational atherectomy, balloon angioplasty, and VERA x2.

## 2025-03-21 NOTE — PROGRESS NOTE ADULT - ASSESSMENT
64F, hx of CHF (last TTE on 1/16/25 EF 30-35%, G2DD), DM, diabetic foot ulcer with a recent admission at [facility] for CHF exacerbation 1/14-1/17 p/w worsening R. leg pain and fluid secretion, was meeting sepsis criteria with podiatry having low suspicion for right cellulitis and admitted for continued management of HF exacerbation and needing ischemic eval.     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  -Left leg Arterial Duplex: Slightly tardus parvus waveform of the left popliteal artery is noted, for which underlying disease cannot be excluded. Posterior tibial artery waveform is nonpulsatile. Anterior tibial artery tardus parvus flow is noted.   Transferred to [facility] for CO2 angiogram as per vascular surgery    #  PAD Wound of lower extremity.   ·  Plan: Podiatry following, b/l serous bullae, no concerns for infection  Found to have RLE coolness  -Right Leg Arterial Duplex: Popliteal artery is occluded with negligible flow of right trifurcation arteries.  -Left leg Arterial Duplex: Slightly tardus parvus waveform of the left popliteal artery is noted, for which underlying disease cannot be excluded. Posterior tibial artery waveform is nonpulsatile. Anterior tibial artery tardus parvus flow is noted.  -Started on heparin gtt and dobutamine gtt -Will transfer to [facility] for CO2 angiogram as per vascular surgery as not candidate for CTA due to worsening SCr  -Keep compression dressing  -LE elevation above heart level at rest.  -Transferred to [facility] for CO2 angiogram   - Overall this patient is at   intermediate  risk (for cardiac death, nonfatal myocardial infarction, and nonfatal cardiac arrest perioperatively for this intermediate  risk procedure).   No cardiac contraindications for CO2 vangiogram  There  are  no further recommendation for risk stratifying imaging/stress testing prior to planned surgery  Seen by Podiatry-No acute pod intervention, local wound care only-   PAD with rest ischemia  s/p AT/Popliteal angioplasty on DAPT  - RLE extensive gangrnous changes to foot dorsum, ankle, heel dorsomedial and lateral lower leg, ischemic changes to 2nd digit and hallux,  Left foot hallux distal tuft well adhered eschar, no acute signs of infection.   -Seen by vascular Plan for AKA vs BKA   Vascular planning for Right AKA Discussed with Vascular she is as in optimal cardiac condition as possible to undergo surgery under [facility]  Her HF is compensated and has had PCI  - Overall this patient is at   intermediate to high but not prohobitive risk (for cardiac death, nonfatal myocardial infarction, and nonfatal cardiac arrest perioperatively for this intermediate risk procedure).     The patient is however without evidence of ACS, Decompensated Heart Failure,Obstructive Valvular Heart disease or Unstable arrhythmia.   There  are  no further recommendation for risk stratifying imaging/stress testing prior to planned surgery  She is in as optimal condition as possible for the procedure      # HFrEF (congestive heart failure). Ischemic Cardiomyopathy  ·  Plan: Hx of HF, previous admission in January, on home Lasix 40 qD, Metoprolol Succ 25 qD. Not on Entresto due to insurance issues  Last TTE: LVSF moderately decreased w/ EF 30-35 %. Moderate G2DD. Mild MR  Stress test: small-sized, moderate defect(s) in the apical wall that is predominantly fixed suggestive of an infarction with minimal marcus-infarct ischemia  Ischemic cardiomyopathy  GDMT on hold 2/2 JAMEL  Repeat TTE EF 20% with WMA RAP~~8  Repeat Limited TTE  Taper off Milrinone and start GDMT  Is currently off Hydral/Isdn and Bumex 2/2 soft BP  Continue HD with UF had 2900mL removed yesterday will stop Milrinone and observe  Started  low dose BBlocker Metoprolol tartate 12.5 mg PO q8  03/10-Off Midodrine will reattempt Hyd/Isdn  03/11-Daily HD/UF   03/12-Awake s/p Tunelled HD catheter-Plan for Discharge to [facility]  03/13-EP evaluation Arias episodes  03/14-Increasing necrosis noted Right LE Vascular planning for Right AKA   03/15-Right AKAvs BKA on Monday 03/16- Awake Sinus Rhythm clinically stable for OR tomorrow    03/17-Somnolent not dyspneac for OR today  03/19-POD#2  R AKA No dyspnea BP stable   03/20-POD#3 Stable start GDMT for HF Limited TTE    03/21-Awake afebrile TTE EF 31% no dyspnea Creat 1.03  -HF GDMT-Metoprolol succ 25mg                   - Furosemide 40mg                   - Spironolactone 25 md                   - Lisinopril 2.5 mg        # CAD  LHC-RCA , severe ostial LM disease, diffuse disease in LAD and LCx, some L to R collaterals-seen by CTS advise cMR for viability poor target vessels for CABG-?High risk LM/LAD PCI  Had cMR-LVEF is 25%, greater than 75% subendocardial scarring involving the basal to mid inferior wall of the left ventricle, left ventricular transmural scarring involving the apical septal wall and likely near transmural scarring of the apical lateral wall. 50% scarring of the left ventricular basal inferoseptal wall.  03/05-s/p PCI-LM/LAD   Continue  uninterrupted DAPT        # JAMEL on CKD   Creatinine Trend: 1.03 <--0.99<--, 1.09<--1.47 <--1.80<--2.99 <--3.61 <--3.08<-- 4.40 <--3.84<--, 3.05<--, 4.46<--, 3.63<--, 2.35<--, 2.36<---3.38<--(HD)-- 4.76<--4.07<---3.90<-- 1.17  Has had 3 sessions HD for interrmittent HD to allow contrast based procedures is non oliguric  Plan to continue HD likely will need extended RRT  Srini change on 03/11  Permacath  Been off dialysis since 03/17  Renal function improved  Plan for Permacath removal Monday 03/24

## 2025-03-21 NOTE — PROGRESS NOTE ADULT - ASSESSMENT
63 y/o F with PMH of CHF (last TTE 1/2025 with EF 30-35%), DM, diabetic foot ulcer, PAD, CAD. Recent admission at [facility] for CHF exacerbation, presented to [facility] as a transfer for worsening R leg pain. Hospital course c/b acute on chronic CHF - TTE with EF 20%, severely decreased LV and RV function, multiple regional motion abnormalities, s/p LHC revealing TVD, pleural/pericardial effusions, JAMEL, leukocytosis suspected to be in the setting of LE wound on IV ABX, persistent RLE pain w/ RLE Arterial Duplex revealing popliteal artery occlusion  Plan for eventual angiogram with vascular when medically optimized. Pulmonary called to consult as pt with c/o SOB.

## 2025-03-21 NOTE — PROGRESS NOTE ADULT - ASSESSMENT
64y.o. Female with PAD, CLTI affecting b/l LE, CHF, chronic b/l LE wound R worsen then the L, with R foot blistering and ulceration was transfer to [facility] for CO2 angiogram. Patient is currently followed by medicine and cardiology now s/p RLE angiogram with pop and AT angioplasty on 2/24/25 now s/p R AKA 3/17.     PLAN:  - Dressing change QD  - LLE arterial duplex pending   - Diet: Regular   - Analgesia and antiemetics as needed  - Strict I&O's   - Incentive spirometry    Vascular [id number]  Darrion Tipton MD (PGY2)

## 2025-03-21 NOTE — PROGRESS NOTE ADULT - SUBJECTIVE AND OBJECTIVE BOX
INTERNAL MEDICINE PROGRESS NOTE     NAME OF PATIENT: TOMASZ ALBA  MRN: [id number]  DATE OF VISIT: 03-21-25 @ 15:15    SUBJECTIVE/ ROS:  - Patient seen and examined by bedside     OBJECTIVE:  ICU Vital Signs Last 24 Hrs  T(C): 36.6 (21 Mar 2025 11:30), Max: 36.9 (20 Mar 2025 21:41)  T(F): 97.9 (21 Mar 2025 11:30), Max: 98.4 (20 Mar 2025 21:41)  HR: 100 (21 Mar 2025 11:30) (98 - 104)  BP: 102/65 (21 Mar 2025 11:30) (95/60 - 102/65)  BP(mean): --  ABP: --  ABP(mean): --  RR: 18 (21 Mar 2025 11:30) (18 - 18)  SpO2: 92% (21 Mar 2025 11:30) (92% - 93%)    O2 Parameters below as of 21 Mar 2025 11:30  Patient On (Oxygen Delivery Method): room air          03-21-25 @ 15:15  T(C): 36.6 (03-21-25 @ 11:30), Max: 36.9 (03-20-25 @ 21:41)  HR: 100 (03-21-25 @ 11:30) (98 - 104)  BP: 102/65 (03-21-25 @ 11:30) (95/60 - 102/65)  RR: 18 (03-21-25 @ 11:30) (18 - 18)  SpO2: 92% (03-21-25 @ 11:30) (92% - 93%)  Wt(kg): --  CAPILLARY BLOOD GLUCOSE      POCT Blood Glucose.: 178 mg/dL (21 Mar 2025 12:03)      HOSPITAL MEDICATIONS:  MEDICATIONS  (STANDING):  acetaminophen     Tablet .. 975 milliGRAM(s) Oral every 8 hours  aspirin enteric coated 81 milliGRAM(s) Oral daily  atorvastatin 40 milliGRAM(s) Oral at bedtime  bisacodyl 5 milliGRAM(s) Oral every 12 hours  chlorhexidine 4% Liquid 1 Application(s) Topical <User Schedule>  clopidogrel Tablet 75 milliGRAM(s) Oral daily  dextrose 5%. 1000 milliLiter(s) (100 mL/Hr) IV Continuous <Continuous>  dextrose 5%. 1000 milliLiter(s) (50 mL/Hr) IV Continuous <Continuous>  dextrose 50% Injectable 25 Gram(s) IV Push once  dextrose 50% Injectable 12.5 Gram(s) IV Push once  dextrose 50% Injectable 25 Gram(s) IV Push once  furosemide    Tablet 40 milliGRAM(s) Oral daily  gabapentin 300 milliGRAM(s) Oral every 8 hours  glucagon  Injectable 1 milliGRAM(s) IntraMuscular once  heparin   Injectable 5000 Unit(s) SubCutaneous every 12 hours  insulin glargine Injectable (LANTUS) 18 Unit(s) SubCutaneous at bedtime  insulin lispro (ADMELOG) corrective regimen sliding scale   SubCutaneous three times a day before meals  insulin lispro (ADMELOG) corrective regimen sliding scale   SubCutaneous at bedtime  insulin lispro Injectable (ADMELOG) 10 Unit(s) SubCutaneous three times a day with meals  lisinopril 2.5 milliGRAM(s) Oral at bedtime  methocarbamol 750 milliGRAM(s) Oral every 6 hours  metoprolol succinate ER 25 milliGRAM(s) Oral daily  Nephro-rober 1 Tablet(s) Oral daily  pantoprazole  Injectable 40 milliGRAM(s) IV Push daily  polyethylene glycol 3350 17 Gram(s) Oral daily  senna 2 Tablet(s) Oral at bedtime  spironolactone 25 milliGRAM(s) Oral daily    MEDICATIONS  (PRN):  ALPRAZolam 0.25 milliGRAM(s) Oral daily PRN anxiety  dextrose Oral Gel 15 Gram(s) Oral once PRN Blood Glucose LESS THAN 70 milliGRAM(s)/deciliter  melatonin 3 milliGRAM(s) Oral at bedtime PRN Insomnia  ondansetron Injectable 4 milliGRAM(s) IV Push every 6 hours PRN Nausea and/or Vomiting  oxyCODONE    IR 5 milliGRAM(s) Oral every 4 hours PRN Severe Pain (7 - 10)  sodium chloride 0.65% Nasal 1 Spray(s) Both Nostrils three times a day PRN Dryness  sodium chloride 0.9% lock flush 10 milliLiter(s) IV Push every 1 hour PRN Pre/post blood products, medications, blood draw, and to maintain line patency      PHYSICAL EXAMINATION:  General: NAD   Cardiology: S1/S2 with no murmur   Respiratory: CTA BL with no wheeze   GI: Soft and NTND  Extremities: JOSE L of BL UE/LE   Neurology: Awake with no acute neurological deficits     LABS:                        10.0   10.92 )-----------( 195      ( 21 Mar 2025 05:51 )             30.1     03-21    131[L]  |  94[L]  |  25[H]  ----------------------------<  154[H]  4.7   |  24  |  1.03    Ca    8.0[L]      21 Mar 2025 05:51  Phos  2.9     03-21  Mg     1.8     03-20            MICROBIOLOGY:     RADIOLOGY:    CARDIOLOGY:

## 2025-03-21 NOTE — CHART NOTE - NSCHARTNOTEFT_GEN_A_CORE
NUTRITION FOLLOW UP NOTE    PATIENT SEEN FOR: Pressure Injury Consult; Nutrition Follow Up     SOURCE: [X] Patient  [x] Current Medical Record  [X] RN  [] Family/support person at bedside  [] Patient unavailable/inappropriate  [] Other:    CHART REVIEWED/EVENTS NOTED.  [] No changes to nutrition care plan to note  [X] Nutrition Status:  - ADHF   - Hx CAD, HTN, HLD   - DM2  - JAMEL resolved; per Nephrology, previously requiring HD (last on 3/14), no need for additional HD at this time.   - Foot ulcer; S/p R AKA (3/17)    DIET ORDER:   Diet, Regular:   Consistent Carbohydrate {No Snacks} (CSTCHO)  1000mL Fluid Restriction ([id number])  No Concentrated Potassium  Low Sodium  No Concentrated Phosphorus  Halal  Lacto Veg (Accepts Milk & Milk Products)  Supplement Feeding Modality:  Oral  Nepro Cans or Servings Per Day:  1       Frequency:  Daily (25)      CURRENT DIET ORDER IS:  [] Appropriate:  [] Inadequate:  [X] Other:    NUTRITION INTAKE/PROVISION:  [X] PO:  - Reports good PO intake; reports occasionally taking some of the snacks off her meal trays to have in-between if she gets hungry. Per nursing documentation, noted pt consuming <50% of meals since 3/15 (? complete accuracy iso intermittent snacks, pt's verbal report).  - Hasn't tried Nepro Protein Shake (per serving provides 19 g PRO, 420 kcal) and is not interested in it, would rather have whole food protein sources; obtained food preferences, RD to allow double proteins at meal-times to aid in PO intake/satiety.   [] Enteral Nutrition:  [] Parenteral Nutrition:    ANTHROPOMETRICS:  Drug Dosing Weight  Height (cm): 162.6 (17 Mar 2025 10:32)  Weight (kg): 72.1 (17 Mar 2025 10:32)  BMI (kg/m2): 27.3 (17 Mar 2025 10:32)    Weights:   Daily Weight in k.5 (-), Weight in k.8 (-), Weight in k.7 (-), Weight in k (-), Weight in k.1 (03-15)   Noted wt fluctuations likely iso fluid shifts, edema; previously on HD   RD will continue to monitor wt trends as available/able     NUTRITIONALLY PERTINENT MEDICATIONS:  MEDICATIONS  (STANDING):  atorvastatin  bisacodyl  dextrose 5%.  dextrose 5%.  dextrose 50% Injectable  dextrose 50% Injectable  dextrose 50% Injectable  furosemide    Tablet  glucagon  Injectable  insulin glargine Injectable (LANTUS)  insulin lispro (ADMELOG) corrective regimen sliding scale  insulin lispro (ADMELOG) corrective regimen sliding scale  insulin lispro Injectable (ADMELOG)  lisinopril  metoprolol succinate ER  Nephro-rober  pantoprazole  Injectable  polyethylene glycol 3350  senna  spironolactone       NUTRITIONALLY PERTINENT LABS:   Na131 mmol/L[L] Glu 154 mg/dL[H] K+ 4.7 mmol/L Cr  1.03 mg/dL BUN 25 mg/dL[H]  Phos 2.9 mg/dL    A1C with Estimated Average Glucose Result: 11.6 % (25 @ 05:40)  A1C with Estimated Average Glucose Result: >15.5 % (24 @ 06:49)      Finger Sticks:  POCT Blood Glucose.: 178 mg/dL ( @ 12:03)  POCT Blood Glucose.: 203 mg/dL ( @ 08:04)  POCT Blood Glucose.: 174 mg/dL ( @ 21:24)  POCT Blood Glucose.: 168 mg/dL ( @ 16:51)      NUTRITIONALLY PERTINENT MEDICATIONS/LABS:  [x] Reviewed  [X] Relevant notes on medications/labs:  - Trending hyponatremia; intermittent hypophosphatemia, Phos WNL today   - A1C 11.6% (); POCTs x 24 hrs elevated; Lantus 18 units at bedtime, Admelog 10 units 3x/day with meals, SSI   - Lasix, Aldactone ordered  - Nephro-Rober supplementation     EDEMA:  [x] Reviewed  [x] Relevant notes: +1 L ankle edema noted per nursing documentation     GI/ I&O:  [x] Reviewed  [X] Relevant notes: No N/V nor diarrhea reported; noted constipation; last BM 3/18. Ordered for Miralax, senna, Dulcolax.   [X] Other: Zofran, PPI ordered    SKIN:   [] No pressure injuries documented, per nursing flowsheet  [] Pressure injury previously noted  [x] Change in pressure injury documentation:  - Wound care (3/19), "B/L buttocks possible unstageable pressure injury "  [x] Other:  - L first toe DM ulcer; L shin venous wound per nursing documentation     ESTIMATED NEEDS:  [x] No change:  [] Updated:  Energy: 8399-4519 kcal/day (30-35 kcal/kg)  Protein: 65-82 g/day (1.2-1.5 g/kg)  Fluid:   ml/day or [x] defer to team  Based on: IBW 54.4 kg     NUTRITION DIAGNOSIS:  [x] Prior Dx: Limited adherence to nutrition-related recommendations, increased nutrient needs   [] New Dx:    EDUCATION:  [x] Yes: Provided education on Nutrition Therapy for Type 2 Diabetes. Educated on utilizing portion control for carbohydrate intake; Emphasis on pairing carbohydrates with protein and healthy fats for glycemic control, and consuming sufficient dietary fiber. Educated on the importance of consuming a diet adequate in protein rich food sources for wound healing; encouraged prioritizing protein foods first at meal times. Encouraged protein rich snacks if pt remains hungry in-between meals. Reviewed constipation nutrition therapy. Pt receptive to diet education and made aware RD remains available upon request.   [] Not appropriate/warranted    NUTRITION CARE PLAN:  [X] Consider changing diet to consistent carbohydrate, DASH (Halal, lactoovovegetarian) iso improved renal function  [X] Consider discontinuing Nepro Protein Shake (per serving provides 19 g PRO, 420 kcal) per pt preference; RD to allow lean double protein servings at meals to aid in meeting protein needs.  [X] Consider changing Nephro-Rober to Multivitamin and adding vitamin C to aid in wound healing, iso improved JAMEL   [X] Monitor BMs iso constipation; continue with bowel regimen as feasible; RD to add fiber rich foods to meals to aid in regularity  [X] Reinforce DM nutrition therapy as able     [] Achieved - Continue current nutrition intervention(s)  [] Current medical condition precludes nutrition intervention at this time.    MONITORING AND EVALUATION:   RD remains available upon request and will follow up per protocol.    Alyx Calabrese RD  Available on [facility] TEAMS NUTRITION FOLLOW UP NOTE    PATIENT SEEN FOR: Pressure Injury Consult; Nutrition Follow Up     SOURCE: [X] Patient  [x] Current Medical Record  [X] RN  [] Family/support person at bedside  [] Patient unavailable/inappropriate  [] Other:    CHART REVIEWED/EVENTS NOTED.  [] No changes to nutrition care plan to note  [X] Nutrition Status:  - ADHF   - Hx CAD, HTN, HLD   - DM2  - JAMEL resolved; per Nephrology, previously requiring HD (last on 3/14), no need for additional HD at this time.   - Foot ulcer; S/p R AKA (3/17)    DIET ORDER:   Diet, Regular:   Consistent Carbohydrate {No Snacks} (CSTCHO)  1000mL Fluid Restriction ([id number])  No Concentrated Potassium  Low Sodium  No Concentrated Phosphorus  Halal  Lacto Veg (Accepts Milk & Milk Products)  Supplement Feeding Modality:  Oral  Nepro Cans or Servings Per Day:  1       Frequency:  Daily (25)      CURRENT DIET ORDER IS:  [] Appropriate:  [] Inadequate:  [X] Other:    NUTRITION INTAKE/PROVISION:  [X] PO:  - Reports good PO intake; reports occasionally taking some of the snacks off her meal trays to have in-between if she gets hungry. Per nursing documentation, noted pt consuming <50% of meals since 3/15 (? complete accuracy iso intermittent snacks, pt's verbal report).  - Hasn't tried Nepro Protein Shake (per serving provides 19 g PRO, 420 kcal) and is not interested in it, would rather have whole food protein sources; obtained food preferences, RD to allow double proteins at meal-times to aid in PO intake/satiety.   [] Enteral Nutrition:  [] Parenteral Nutrition:    ANTHROPOMETRICS:  Drug Dosing Weight  Height (cm): 162.6 (17 Mar 2025 10:32)  Weight (kg): 72.1 (17 Mar 2025 10:32)  BMI (kg/m2): 27.3 (17 Mar 2025 10:32)    Weights:   Daily Weight in k.5 (-), Weight in k.8 (-), Weight in k.7 (-), Weight in k (-), Weight in k.1 (03-15)  Previous lowest daily wt: 60 (3/11)  Adjusted IBW (w/ consideration of R AKA): 48.7 kg   Noted wt fluctuations likely iso fluid shifts, edema; previously on HD   RD will continue to monitor wt trends as available/able     NUTRITIONALLY PERTINENT MEDICATIONS:  MEDICATIONS  (STANDING):  atorvastatin  bisacodyl  dextrose 5%.  dextrose 5%.  dextrose 50% Injectable  dextrose 50% Injectable  dextrose 50% Injectable  furosemide    Tablet  glucagon  Injectable  insulin glargine Injectable (LANTUS)  insulin lispro (ADMELOG) corrective regimen sliding scale  insulin lispro (ADMELOG) corrective regimen sliding scale  insulin lispro Injectable (ADMELOG)  lisinopril  metoprolol succinate ER  Nephro-rober  pantoprazole  Injectable  polyethylene glycol 3350  senna  spironolactone       NUTRITIONALLY PERTINENT LABS:   Na131 mmol/L[L] Glu 154 mg/dL[H] K+ 4.7 mmol/L Cr  1.03 mg/dL BUN 25 mg/dL[H]  Phos 2.9 mg/dL    A1C with Estimated Average Glucose Result: 11.6 % (25 @ 05:40)  A1C with Estimated Average Glucose Result: >15.5 % (24 @ 06:49)      Finger Sticks:  POCT Blood Glucose.: 178 mg/dL ( @ 12:03)  POCT Blood Glucose.: 203 mg/dL ( @ 08:04)  POCT Blood Glucose.: 174 mg/dL ( @ 21:24)  POCT Blood Glucose.: 168 mg/dL ( @ 16:51)      NUTRITIONALLY PERTINENT MEDICATIONS/LABS:  [x] Reviewed  [X] Relevant notes on medications/labs:  - Trending hyponatremia; intermittent hypophosphatemia, Phos WNL today   - A1C 11.6% (); POCTs x 24 hrs elevated; Lantus 18 units at bedtime, Admelog 10 units 3x/day with meals, SSI   - Lasix, Aldactone ordered  - Nephro-Rober supplementation     EDEMA:  [x] Reviewed  [x] Relevant notes: +1 L ankle edema noted per nursing documentation     GI/ I&O:  [x] Reviewed  [X] Relevant notes: No N/V nor diarrhea reported; noted constipation; last BM 3/18. Ordered for Miralax, senna, Dulcolax.   [X] Other: Zofran, PPI ordered    SKIN:   [] No pressure injuries documented, per nursing flowsheet  [] Pressure injury previously noted  [x] Change in pressure injury documentation:  - Wound care (3/19), "B/L buttocks possible unstageable pressure injury "  [x] Other:  - L first toe DM ulcer; L shin venous wound per nursing documentation     ESTIMATED NEEDS:  [] No change:  [X] Updated:  Energy: 1607 - 1850 kcal/day (33 - 38 kcal/kg)  Protein: 63.3 - 78 g/day (1.3 - 1.6  g/kg)  Fluid:   ml/day or [x] defer to team  Based on: Adjusted IBW of 48.7     NUTRITION DIAGNOSIS:  [x] Prior Dx: Limited adherence to nutrition-related recommendations, increased nutrient needs   [] New Dx:    EDUCATION:  [x] Yes: Provided education on Nutrition Therapy for Type 2 Diabetes. Educated on utilizing portion control for carbohydrate intake; Emphasis on pairing carbohydrates with protein and healthy fats for glycemic control, and consuming sufficient dietary fiber. Educated on the importance of consuming a diet adequate in protein rich food sources for wound healing; encouraged prioritizing protein foods first at meal times. Encouraged protein rich snacks if pt remains hungry in-between meals. Reviewed constipation nutrition therapy. Pt receptive to diet education and made aware RD remains available upon request.   [] Not appropriate/warranted    NUTRITION CARE PLAN:  [X] Consider changing diet to consistent carbohydrate, DASH (Halal, lactoovovegetarian) iso improved renal function  [X] Consider discontinuing Nepro Protein Shake (per serving provides 19 g PRO, 420 kcal) per pt preference; RD to allow lean double protein servings at meals to aid in meeting protein needs.  [X] Consider changing Nephro-Rober to Multivitamin and adding vitamin C to aid in wound healing, iso improved JAMEL   [X] Monitor BMs iso constipation; continue with bowel regimen as feasible; RD to add fiber rich foods to meals to aid in regularity  [X] Reinforce DM nutrition therapy as able     [] Achieved - Continue current nutrition intervention(s)  [] Current medical condition precludes nutrition intervention at this time.    MONITORING AND EVALUATION:   RD remains available upon request and will follow up per protocol.    Alyx Calabrese RD  Available on [facility] TEAMS

## 2025-03-21 NOTE — PROGRESS NOTE ADULT - SUBJECTIVE AND OBJECTIVE BOX
Follow-up Pulm Progress Note    No new respiratory events overnight.  Denies SOB/CP.     Medications:  MEDICATIONS  (STANDING):  acetaminophen     Tablet .. 975 milliGRAM(s) Oral every 8 hours  aspirin enteric coated 81 milliGRAM(s) Oral daily  atorvastatin 40 milliGRAM(s) Oral at bedtime  bisacodyl 5 milliGRAM(s) Oral every 12 hours  chlorhexidine 4% Liquid 1 Application(s) Topical <User Schedule>  clopidogrel Tablet 75 milliGRAM(s) Oral daily  dextrose 5%. 1000 milliLiter(s) (100 mL/Hr) IV Continuous <Continuous>  dextrose 5%. 1000 milliLiter(s) (50 mL/Hr) IV Continuous <Continuous>  dextrose 50% Injectable 25 Gram(s) IV Push once  dextrose 50% Injectable 12.5 Gram(s) IV Push once  dextrose 50% Injectable 25 Gram(s) IV Push once  furosemide    Tablet 40 milliGRAM(s) Oral daily  gabapentin 300 milliGRAM(s) Oral every 8 hours  glucagon  Injectable 1 milliGRAM(s) IntraMuscular once  heparin   Injectable 5000 Unit(s) SubCutaneous every 12 hours  insulin glargine Injectable (LANTUS) 18 Unit(s) SubCutaneous at bedtime  insulin lispro (ADMELOG) corrective regimen sliding scale   SubCutaneous three times a day before meals  insulin lispro (ADMELOG) corrective regimen sliding scale   SubCutaneous at bedtime  insulin lispro Injectable (ADMELOG) 10 Unit(s) SubCutaneous three times a day with meals  lisinopril 2.5 milliGRAM(s) Oral at bedtime  methocarbamol 750 milliGRAM(s) Oral every 6 hours  metoprolol succinate ER 25 milliGRAM(s) Oral daily  Nephro-rober 1 Tablet(s) Oral daily  pantoprazole  Injectable 40 milliGRAM(s) IV Push daily  polyethylene glycol 3350 17 Gram(s) Oral daily  senna 2 Tablet(s) Oral at bedtime  spironolactone 25 milliGRAM(s) Oral daily    MEDICATIONS  (PRN):  ALPRAZolam 0.25 milliGRAM(s) Oral daily PRN anxiety  dextrose Oral Gel 15 Gram(s) Oral once PRN Blood Glucose LESS THAN 70 milliGRAM(s)/deciliter  melatonin 3 milliGRAM(s) Oral at bedtime PRN Insomnia  ondansetron Injectable 4 milliGRAM(s) IV Push every 6 hours PRN Nausea and/or Vomiting  oxyCODONE    IR 5 milliGRAM(s) Oral every 4 hours PRN Severe Pain (7 - 10)  sodium chloride 0.65% Nasal 1 Spray(s) Both Nostrils three times a day PRN Dryness  sodium chloride 0.9% lock flush 10 milliLiter(s) IV Push every 1 hour PRN Pre/post blood products, medications, blood draw, and to maintain line patency          Vital Signs Last 24 Hrs  T(C): 36.6 (21 Mar 2025 11:30), Max: 36.9 (20 Mar 2025 21:41)  T(F): 97.9 (21 Mar 2025 11:30), Max: 98.4 (20 Mar 2025 21:41)  HR: 100 (21 Mar 2025 11:30) (98 - 104)  BP: 102/65 (21 Mar 2025 11:30) (95/60 - 102/65)  BP(mean): --  RR: 18 (21 Mar 2025 11:30) (18 - 18)  SpO2: 92% (21 Mar 2025 11:30) (92% - 93%)    Parameters below as of 21 Mar 2025 11:30  Patient On (Oxygen Delivery Method): room air              03-20 @ 07:01  -  03-21 @ 07:00  --------------------------------------------------------  IN: 200 mL / OUT: 640 mL / NET: -440 mL          LABS:                        10.0   10.92 )-----------( 195      ( 21 Mar 2025 05:51 )             30.1     03-21    131[L]  |  94[L]  |  25[H]  ----------------------------<  154[H]  4.7   |  24  |  1.03    Ca    8.0[L]      21 Mar 2025 05:51  Phos  2.9     03-21  Mg     1.8     03-20            CAPILLARY BLOOD GLUCOSE      POCT Blood Glucose.: 178 mg/dL (21 Mar 2025 12:03)      Urinalysis Basic - ( 21 Mar 2025 05:51 )    Color: x / Appearance: x / SG: x / pH: x  Gluc: 154 mg/dL / Ketone: x  / Bili: x / Urobili: x   Blood: x / Protein: x / Nitrite: x   Leuk Esterase: x / RBC: x / WBC x   Sq Epi: x / Non Sq Epi: x / Bacteria: x        Physical Examination:  PULM: grossly clear  CVS: S1, S2 heard    RADIOLOGY REVIEWED  CXR: mild congestion

## 2025-03-21 NOTE — PROGRESS NOTE ADULT - SUBJECTIVE AND OBJECTIVE BOX
[facility] NEPHROLOGY- PROGRESS NOTE    64y Female with history of CHF presents as a transfer for LE CO2 angiogram. Nephrology consulted for elevated Scr.    Patient s/p R AKA on 3/17.     REVIEW OF SYSTEMS:  Gen: no fevers  Cards: no chest pain  Resp: no dyspnea  GI: no nausea or vomiting or diarrhea  Vascular: no LE edema    Allergies:  Augmentin (Stomach Upset; Vomiting; Nausea)    Hospital Medications: Medications reviewed      VITALS:  T(F): 97.9 (03-21-25 @ 08:40), Max: 98.4 (03-20-25 @ 21:41)  HR: 98 (03-21-25 @ 08:40)  BP: 95/63 (03-21-25 @ 08:40)  RR: 18 (03-21-25 @ 08:40)  SpO2: 93% (03-21-25 @ 08:40)  Wt(kg): --    03-20 @ 07:01  -  03-21 @ 07:00  --------------------------------------------------------  IN: 200 mL / OUT: 640 mL / NET: -440 mL      PHYSICAL EXAM:  Gen: NAD, calm  Cards: RRR, +S1/S2, no M/G/R  Resp: bibasilar rales  GI: soft, NT/ND, NABS  : + stiles  Vascular: R AKA, + LLE edema, + RIJ TDC intact      LABS:  03-21    Na+ trend: 131 <--, 131 <--, 130 <--, 131 <--, 133 <--, 136 <--    131[L]  |  94[L]  |  25[H]  ----------------------------<  154[H]  4.7   |  24  |  1.03    Ca    8.0[L]      21 Mar 2025 05:51  Phos  2.9     03-21  Mg     1.8     03-20      Creatinine Trend: 1.03 <--, 1.06 <--, 0.99 <--, 1.09 <--, 1.47 <--, 1.80 <--                        10.0   10.92 )-----------( 195      ( 21 Mar 2025 05:51 )             30.1     Urine Studies:  Urinalysis Basic - ( 21 Mar 2025 05:51 )    Color:  / Appearance:  / SG:  / pH:   Gluc: 154 mg/dL / Ketone:   / Bili:  / Urobili:    Blood:  / Protein:  / Nitrite:    Leuk Esterase:  / RBC:  / WBC    Sq Epi:  / Non Sq Epi:  / Bacteria:

## 2025-03-22 LAB
ANION GAP SERPL CALC-SCNC: 10 MMOL/L — SIGNIFICANT CHANGE UP (ref 5–17)
BUN SERPL-MCNC: 25 MG/DL — HIGH (ref 7–23)
CALCIUM SERPL-MCNC: 8.6 MG/DL — SIGNIFICANT CHANGE UP (ref 8.4–10.5)
CHLORIDE SERPL-SCNC: 96 MMOL/L — SIGNIFICANT CHANGE UP (ref 96–108)
CO2 SERPL-SCNC: 26 MMOL/L — SIGNIFICANT CHANGE UP (ref 22–31)
CREAT SERPL-MCNC: 0.96 MG/DL — SIGNIFICANT CHANGE UP (ref 0.5–1.3)
EGFR: 66 ML/MIN/1.73M2 — SIGNIFICANT CHANGE UP
EGFR: 66 ML/MIN/1.73M2 — SIGNIFICANT CHANGE UP
GLUCOSE BLDC GLUCOMTR-MCNC: 116 MG/DL — HIGH (ref 70–99)
GLUCOSE BLDC GLUCOMTR-MCNC: 125 MG/DL — HIGH (ref 70–99)
GLUCOSE BLDC GLUCOMTR-MCNC: 147 MG/DL — HIGH (ref 70–99)
GLUCOSE BLDC GLUCOMTR-MCNC: 148 MG/DL — HIGH (ref 70–99)
GLUCOSE SERPL-MCNC: 143 MG/DL — HIGH (ref 70–99)
HCT VFR BLD CALC: 30.2 % — LOW (ref 34.5–45)
HGB BLD-MCNC: 10.2 G/DL — LOW (ref 11.5–15.5)
MCHC RBC-ENTMCNC: 25.6 PG — LOW (ref 27–34)
MCHC RBC-ENTMCNC: 33.8 G/DL — SIGNIFICANT CHANGE UP (ref 32–36)
MCV RBC AUTO: 75.7 FL — LOW (ref 80–100)
NRBC BLD AUTO-RTO: 0 /100 WBCS — SIGNIFICANT CHANGE UP (ref 0–0)
PLATELET # BLD AUTO: 237 K/UL — SIGNIFICANT CHANGE UP (ref 150–400)
POTASSIUM SERPL-MCNC: 4.9 MMOL/L — SIGNIFICANT CHANGE UP (ref 3.5–5.3)
POTASSIUM SERPL-SCNC: 4.9 MMOL/L — SIGNIFICANT CHANGE UP (ref 3.5–5.3)
RBC # BLD: 3.99 M/UL — SIGNIFICANT CHANGE UP (ref 3.8–5.2)
RBC # FLD: 22.4 % — HIGH (ref 10.3–14.5)
SODIUM SERPL-SCNC: 132 MMOL/L — LOW (ref 135–145)
WBC # BLD: 12.5 K/UL — HIGH (ref 3.8–10.5)
WBC # FLD AUTO: 12.5 K/UL — HIGH (ref 3.8–10.5)

## 2025-03-22 RX ORDER — SALINE 7; 19 G/118ML; G/118ML
1 ENEMA RECTAL ONCE
Refills: 0 | Status: COMPLETED | OUTPATIENT
Start: 2025-03-22 | End: 2025-03-22

## 2025-03-22 RX ADMIN — METHOCARBAMOL 750 MILLIGRAM(S): 500 TABLET, FILM COATED ORAL at 01:12

## 2025-03-22 RX ADMIN — OXYCODONE HYDROCHLORIDE 5 MILLIGRAM(S): 30 TABLET ORAL at 07:30

## 2025-03-22 RX ADMIN — ATORVASTATIN CALCIUM 40 MILLIGRAM(S): 80 TABLET, FILM COATED ORAL at 21:41

## 2025-03-22 RX ADMIN — Medication 975 MILLIGRAM(S): at 05:35

## 2025-03-22 RX ADMIN — OXYCODONE HYDROCHLORIDE 5 MILLIGRAM(S): 30 TABLET ORAL at 16:25

## 2025-03-22 RX ADMIN — METHOCARBAMOL 750 MILLIGRAM(S): 500 TABLET, FILM COATED ORAL at 18:45

## 2025-03-22 RX ADMIN — Medication 2 TABLET(S): at 21:40

## 2025-03-22 RX ADMIN — SALINE 1 ENEMA: 7; 19 ENEMA RECTAL at 23:15

## 2025-03-22 RX ADMIN — Medication 975 MILLIGRAM(S): at 21:41

## 2025-03-22 RX ADMIN — POLYETHYLENE GLYCOL 3350 17 GRAM(S): 17 POWDER, FOR SOLUTION ORAL at 11:33

## 2025-03-22 RX ADMIN — OXYCODONE HYDROCHLORIDE 5 MILLIGRAM(S): 30 TABLET ORAL at 05:35

## 2025-03-22 RX ADMIN — Medication 25 MILLIGRAM(S): at 05:35

## 2025-03-22 RX ADMIN — GABAPENTIN 300 MILLIGRAM(S): 400 CAPSULE ORAL at 14:15

## 2025-03-22 RX ADMIN — FUROSEMIDE 40 MILLIGRAM(S): 10 INJECTION INTRAMUSCULAR; INTRAVENOUS at 05:34

## 2025-03-22 RX ADMIN — OXYCODONE HYDROCHLORIDE 5 MILLIGRAM(S): 30 TABLET ORAL at 01:12

## 2025-03-22 RX ADMIN — Medication 5 MILLIGRAM(S): at 18:45

## 2025-03-22 RX ADMIN — CLOPIDOGREL BISULFATE 75 MILLIGRAM(S): 75 TABLET, FILM COATED ORAL at 11:35

## 2025-03-22 RX ADMIN — Medication 5 MILLIGRAM(S): at 05:35

## 2025-03-22 RX ADMIN — HEPARIN SODIUM 5000 UNIT(S): 1000 INJECTION INTRAVENOUS; SUBCUTANEOUS at 18:45

## 2025-03-22 RX ADMIN — Medication 975 MILLIGRAM(S): at 14:15

## 2025-03-22 RX ADMIN — GABAPENTIN 300 MILLIGRAM(S): 400 CAPSULE ORAL at 21:41

## 2025-03-22 RX ADMIN — OXYCODONE HYDROCHLORIDE 5 MILLIGRAM(S): 30 TABLET ORAL at 21:41

## 2025-03-22 RX ADMIN — Medication 1 TABLET(S): at 11:34

## 2025-03-22 RX ADMIN — METHOCARBAMOL 750 MILLIGRAM(S): 500 TABLET, FILM COATED ORAL at 11:33

## 2025-03-22 RX ADMIN — OXYCODONE HYDROCHLORIDE 5 MILLIGRAM(S): 30 TABLET ORAL at 02:18

## 2025-03-22 RX ADMIN — GABAPENTIN 300 MILLIGRAM(S): 400 CAPSULE ORAL at 05:34

## 2025-03-22 RX ADMIN — Medication 40 MILLIGRAM(S): at 11:34

## 2025-03-22 RX ADMIN — Medication 1 APPLICATION(S): at 05:28

## 2025-03-22 RX ADMIN — INSULIN LISPRO 10 UNIT(S): 100 INJECTION, SOLUTION INTRAVENOUS; SUBCUTANEOUS at 12:08

## 2025-03-22 RX ADMIN — LISINOPRIL 2.5 MILLIGRAM(S): 5 TABLET ORAL at 21:41

## 2025-03-22 RX ADMIN — Medication 975 MILLIGRAM(S): at 07:30

## 2025-03-22 RX ADMIN — OXYCODONE HYDROCHLORIDE 5 MILLIGRAM(S): 30 TABLET ORAL at 15:25

## 2025-03-22 RX ADMIN — Medication 81 MILLIGRAM(S): at 11:33

## 2025-03-22 RX ADMIN — HEPARIN SODIUM 5000 UNIT(S): 1000 INJECTION INTRAVENOUS; SUBCUTANEOUS at 05:36

## 2025-03-22 RX ADMIN — METHOCARBAMOL 750 MILLIGRAM(S): 500 TABLET, FILM COATED ORAL at 05:36

## 2025-03-22 RX ADMIN — INSULIN LISPRO 10 UNIT(S): 100 INJECTION, SOLUTION INTRAVENOUS; SUBCUTANEOUS at 08:37

## 2025-03-22 RX ADMIN — Medication 975 MILLIGRAM(S): at 15:15

## 2025-03-22 RX ADMIN — INSULIN LISPRO 10 UNIT(S): 100 INJECTION, SOLUTION INTRAVENOUS; SUBCUTANEOUS at 17:05

## 2025-03-22 RX ADMIN — INSULIN GLARGINE-YFGN 18 UNIT(S): 100 INJECTION, SOLUTION SUBCUTANEOUS at 21:58

## 2025-03-22 NOTE — PROGRESS NOTE ADULT - ASSESSMENT
64F, hx of CHF (last TTE on 1/16/25 EF 30-35%, G2DD), DM, diabetic foot ulcer with a recent admission at [facility] for CHF exacerbation 1/14-1/17 p/w worsening R. leg pain and fluid secretion, was meeting sepsis criteria with podiatry having low suspicion for right cellulitis and admitted for continued management of HF exacerbation and needing ischemic eval.     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  -Left leg Arterial Duplex: Slightly tardus parvus waveform of the left popliteal artery is noted, for which underlying disease cannot be excluded. Posterior tibial artery waveform is nonpulsatile. Anterior tibial artery tardus parvus flow is noted.   Transferred to [facility] for CO2 angiogram as per vascular surgery    #  PAD Wound of lower extremity.   ·  Plan: Podiatry following, b/l serous bullae, no concerns for infection  Found to have RLE coolness  -Right Leg Arterial Duplex: Popliteal artery is occluded with negligible flow of right trifurcation arteries.  -Left leg Arterial Duplex: Slightly tardus parvus waveform of the left popliteal artery is noted, for which underlying disease cannot be excluded. Posterior tibial artery waveform is nonpulsatile. Anterior tibial artery tardus parvus flow is noted.  -Started on heparin gtt and dobutamine gtt -Will transfer to [facility] for CO2 angiogram as per vascular surgery as not candidate for CTA due to worsening SCr  -Keep compression dressing  -LE elevation above heart level at rest.  -Transferred to [facility] for CO2 angiogram   - Overall this patient is at   intermediate  risk (for cardiac death, nonfatal myocardial infarction, and nonfatal cardiac arrest perioperatively for this intermediate  risk procedure).   No cardiac contraindications for CO2 vangiogram  There  are  no further recommendation for risk stratifying imaging/stress testing prior to planned surgery  Seen by Podiatry-No acute pod intervention, local wound care only-   PAD with rest ischemia  s/p AT/Popliteal angioplasty on DAPT  - RLE extensive gangrnous changes to foot dorsum, ankle, heel dorsomedial and lateral lower leg, ischemic changes to 2nd digit and hallux,  Left foot hallux distal tuft well adhered eschar, no acute signs of infection.   -Seen by vascular Plan for AKA   -s/p Right AKA  -LLE Arterial Duplex-Moderate stenosis of the superficial femoral artery in the mid thigh.  High-grade stenosis of the anterior tibial artery in the upper calf.  Segmental occlusion of the mid and distal posterior tibial artery.  -Vascular follow up    # HFrEF (congestive heart failure). Ischemic Cardiomyopathy  ·  Plan: Hx of HF, previous admission in January, on home Lasix 40 qD, Metoprolol Succ 25 qD. Not on Entresto due to insurance issues  Last TTE: LVSF moderately decreased w/ EF 30-35 %. Moderate G2DD. Mild MR  Stress test: small-sized, moderate defect(s) in the apical wall that is predominantly fixed suggestive of an infarction with minimal marcus-infarct ischemia  Ischemic cardiomyopathy  GDMT on hold 2/2 JAMEL  Repeat TTE EF 20% with WMA RAP~~8  Repeat Limited TTE  Taper off Milrinone and start GDMT  Is currently off Hydral/Isdn and Bumex 2/2 soft BP  Continue HD with UF had 2900mL removed yesterday will stop Milrinone and observe  Started  low dose BBlocker Metoprolol tartate 12.5 mg PO q8  03/10-Off Midodrine will reattempt Hyd/Isdn  03/11-Daily HD/UF   03/12-Awake s/p Tunelled HD catheter-Plan for Discharge to [facility]  03/13-EP evaluation Arias episodes  03/14-Increasing necrosis noted Right LE Vascular planning for Right AKA   03/15-Right AKAvs BKA on Monday 03/16- Awake Sinus Rhythm clinically stable for OR tomorrow    03/17-Somnolent not dyspneac for OR today  03/19-POD#2  R AKA No dyspnea BP stable   03/20-POD#3 Stable start GDMT for HF Limited TTE    03/21-Awake afebrile TTE EF 31% no dyspnea Creat 1.03  -HF GDMT-Metoprolol succ 25mg                   - Furosemide 40mg                   - Spironolactone 25 md                   - Lisinopril 2.5 mg        # CAD  LHC-RCA , severe ostial LM disease, diffuse disease in LAD and LCx, some L to R collaterals-seen by CTS advise cMR for viability poor target vessels for CABG-?High risk LM/LAD PCI  Had cMR-LVEF is 25%, greater than 75% subendocardial scarring involving the basal to mid inferior wall of the left ventricle, left ventricular transmural scarring involving the apical septal wall and likely near transmural scarring of the apical lateral wall. 50% scarring of the left ventricular basal inferoseptal wall.  03/05-s/p PCI-LM/LAD   Continue  uninterrupted DAPT        # JAMEL on CKD   Creatinine Trend: 1.03 <--0.99<--, 1.09<--1.47 <--1.80<--2.99 <--3.61 <--3.08<-- 4.40 <--3.84<--, 3.05<--, 4.46<--, 3.63<--, 2.35<--, 2.36<---3.38<--(HD)-- 4.76<--4.07<---3.90<-- 1.17  Has had 3 sessions HD for interrmittent HD to allow contrast based procedures is non oliguric  Plan to continue HD likely will need extended RRT  Srini change on 03/11  Permacath  Been off dialysis since 03/17  Renal function improved  Plan for Permacath removal Monday 03/24

## 2025-03-22 NOTE — PROGRESS NOTE ADULT - SUBJECTIVE AND OBJECTIVE BOX
MR#[id number]  PATIENT NAME:COLE ALBA    DATE OF SERVICE: 03-22-25 @ 07:44  Patient was seen and examined by Yordy Gilmore MD on    03-22-25 @ 07:44 .  Interim events noted.Consultant notes ,Labs,Telemetry reviewed by me       HOSPITAL COURSE: HPI:  64F, hx of CHF (last TTE on 1/16/25 EF 30-35%, G2DD), DM, diabetic foot ulcer with a recent admission at [facility] for CHF exacerbation presented as a transfer for worsening R. leg pain and fluid secretion, On non-invasive imaging demonstrating severe depletion of RLE blood flow beyond the popliteal vessel at knee and similar findings in L. Was transferred to [facility] for CO2 angiogram with Dr. Baltazar. (29 Jan 2025 20:05)      INTERIM EVENTS:Patient seen at bedside ,interim events noted.  03/05-Awake s/p LHC VERA x 1 to LAD, VERA to LM , POBA to CX via RFA  03/18-POD#1 Right AKA  03/19-Awake alert pain controlled-no dyspnea Sinus Rhythm  03/20-Awake afebrile no dyspnea started on low dose ACE  03/22-Awake alert no dyspnea - LLE arterial US results:   Moderate stenosis of the superficial femoral artery in the mid thigh.  High-grade stenosis of the anterior tibial artery in the upper calf.  Segmental occlusion of the mid and distal posterior tibial artery.      PMH -reviewed admission note, no change since admission  HEART FAILURE: Acute[ x]Chronic[ ] Systolic[x ] Diastolic[ ] Combined Systolic and Diastolic[ ]  CAD[x ] CABG[ ] PCI[x ]  DEVICES[ ] PPM[ ] ICD[ ] ILR[ ]  ATRIAL FIBRILLATION[ ] Paroxysmal[ ] Permanent[ ] CHADS2-[  ]  JAMEL[ ] CKD1[ ] CKD2[ ] CKD3[ ] CKD4[ ] ESRD[ ]  COPD[ ] HTN[x ]   DM[x ] Type1[ ] Type 2[x ]   CVA[ ] Paresis[ ]    AMBULATION: Assisted[x ] Cane/walker[ ] Independent[ ]  MEDICATIONS  (STANDING):  acetaminophen     Tablet .. 975 milliGRAM(s) Oral every 8 hours  aspirin enteric coated 81 milliGRAM(s) Oral daily  atorvastatin 40 milliGRAM(s) Oral at bedtime  bisacodyl 5 milliGRAM(s) Oral every 12 hours  chlorhexidine 4% Liquid 1 Application(s) Topical <User Schedule>  clopidogrel Tablet 75 milliGRAM(s) Oral daily  furosemide    Tablet 40 milliGRAM(s) Oral daily  gabapentin 300 milliGRAM(s) Oral every 8 hours  glucagon  Injectable 1 milliGRAM(s) IntraMuscular once  heparin   Injectable 5000 Unit(s) SubCutaneous every 12 hours  insulin glargine Injectable (LANTUS) 18 Unit(s) SubCutaneous at bedtime  insulin lispro (ADMELOG) corrective regimen sliding scale   SubCutaneous three times a day before meals  insulin lispro (ADMELOG) corrective regimen sliding scale   SubCutaneous at bedtime  insulin lispro Injectable (ADMELOG) 10 Unit(s) SubCutaneous three times a day with meals  lisinopril 2.5 milliGRAM(s) Oral at bedtime  methocarbamol 750 milliGRAM(s) Oral every 6 hours  metoprolol succinate ER 25 milliGRAM(s) Oral daily  Nephro-rober 1 Tablet(s) Oral daily  pantoprazole  Injectable 40 milliGRAM(s) IV Push daily  polyethylene glycol 3350 17 Gram(s) Oral daily  senna 2 Tablet(s) Oral at bedtime  spironolactone 25 milliGRAM(s) Oral daily    MEDICATIONS  (PRN):  ALPRAZolam 0.25 milliGRAM(s) Oral daily PRN anxiety  dextrose Oral Gel 15 Gram(s) Oral once PRN Blood Glucose LESS THAN 70 milliGRAM(s)/deciliter  melatonin 3 milliGRAM(s) Oral at bedtime PRN Insomnia  ondansetron Injectable 4 milliGRAM(s) IV Push every 6 hours PRN Nausea and/or Vomiting  oxyCODONE    IR 5 milliGRAM(s) Oral every 4 hours PRN Severe Pain (7 - 10)  sodium chloride 0.65% Nasal 1 Spray(s) Both Nostrils three times a day PRN Dryness  sodium chloride 0.9% lock flush 10 milliLiter(s) IV Push every 1 hour PRN Pre/post blood products, medications, blood draw, and to maintain line patency            REVIEW OF SYSTEMS:  Constitutional: [ ] fever, [ ]weight loss,  [x ]fatigue [ ]weight gain  Eyes: [ ] visual changes  Respiratory: [ ]shortness of breath;  [ ] cough, [ ]wheezing, [ ]chills, [ ]hemoptysis  Cardiovascular: [ ] chest pain, [ ]palpitations, [ ]dizziness,  [ ]leg swelling[ ]orthopnea[ ]PND  Gastrointestinal: [ ] abdominal pain, [ ]nausea, [ ]vomiting,  [ ]diarrhea [ ]Constipation [ ]Melena  Genitourinary: [ ] dysuria, [ ] hematuria [ ][place]  Neurologic: [ ] headaches [ ] tremors[ ]weakness [ ]Paralysis Right[ ] Left[ ]  Skin: [ ] itching, [ ]burning, [ ] rashes  Endocrine: [ ] heat or cold intolerance  Musculoskeletal: [ ] joint pain or swelling; [ ] muscle, back, or extremity pain  Psychiatric: [ ] depression, [ ]anxiety, [ ]mood swings, or [ ]difficulty sleeping  Hematologic: [ ] easy bruising, [ ] bleeding gums    [ ] All remaining systems negative except as per above.   [ ]Unable to obtain.  [x] No change in ROS since admission      Vital Signs Last 24 Hrs  T(C): 36.8 (22 Mar 2025 04:19), Max: 37.1 (21 Mar 2025 20:33)  T(F): 98.2 (22 Mar 2025 04:19), Max: 98.8 (21 Mar 2025 20:33)  HR: 96 (22 Mar 2025 04:19) (96 - 104)  BP: 104/69 (22 Mar 2025 04:19) (95/63 - 109/74)  RR: 18 (22 Mar 2025 04:19) (16 - 18)  SpO2: 93% (22 Mar 2025 04:19) (91% - 94%)    Parameters below as of 22 Mar 2025 04:19  Patient On (Oxygen Delivery Method): room air      I&O's Summary    21 Mar 2025 07:01  -  22 Mar 2025 07:00  --------------------------------------------------------  IN: 480 mL / OUT: 650 mL / NET: -170 mL        PHYSICAL EXAM:  General: No acute distress BMI-24  HEENT: EOMI, PERRL  Neck: Supple, [ ] JVD  Lungs: Equal air entry bilaterally; [ ] rales [ ] wheezing [ ] rhonchi  Heart: Regular rate and rhythm; [x ] murmur   2/6 [ x] systolic [ ] diastolic [ ] radiation[ ] rubs [ ]  gallops  Abdomen: Nontender, bowel sounds present  Extremities: No clubbing, cyanosis, [ ] edema [x ]Right AKA  Nervous system:  Alert & Oriented X3, no focal deficits  Psychiatric: Normal affect  Skin: No rashes or lesions    LABS:  03-21    131[L]  |  94[L]  |  25[H]  ----------------------------<  154[H]  4.7   |  24  |  1.03    Ca    8.0[L]      21 Mar 2025 05:51  Phos  2.9     03-21      Creatinine Trend: 1.03<--, 1.06<--, 0.99<--, 1.09<--, 1.47<--, 1.80<--                        10.0   10.92 )-----------( 195      ( 21 Mar 2025 05:51 )             30.1      VA Duplex Low Ext Arterial, Ltd, Left (03.21.25 @ 16:22) >  IMPRESSION:    Calcified plaque.  Moderate stenosis of the superficial femoral artery in the mid thigh.  High-grade stenosis of the anterior tibial artery in the upper calf.  Segmental occlusion of the mid and distal posterior tibial artery.  Examination findings were conveyed to nurse practitioner Lucho by  vascular technologist Cameron at 1620 hours on 3/21/2025 with read back.      TTE Limited W or WO Ultrasound Enhancing Agent (03.20.25 @ 17:08) >  CONCLUSIONS:      1. Left ventricular systolic function is severely decreased with an ejection fraction of 31 % by Winchester's method of disks.   2. Multiple segmental abnormalities exist. See findings.   3. Normal right ventricular cavity size and reduced right ventricular systolic function.   4. No pericardial effusion seen.   5. Compared to the transthoracic echocardiogram performed on 3/10/2025, there havebeen no significant interval changes.   6. There is no evidence of a left ventricular thrombus.   7. There is normal LV mass and normal geometry.    12 Lead ECG (03.11.25 @ 04:35) >  SINUS RHYTHM WITHMARKED SINUS ARRHYTHMIA  POSSIBLE ANTERIOR INFARCT , AGE UNDETERMINED  ABNORMAL ECG        Cardiac Catheterization (03.04.25 @ 09:07) >  Conclusions:   Impella placed for HR-PCI (pt was turned down for CABG). EF 25%     Severe proximal LCx stenosis s/p successful rotational atherectomy and balloon angioplasty.   Severe LM and proximal LAD stenosis s/p successful rotational atherectomy, balloon angioplasty, and VERA x2.

## 2025-03-22 NOTE — PROGRESS NOTE ADULT - SUBJECTIVE AND OBJECTIVE BOX
Name of Patient : TOMASZ ALBA  MRN: [id number]        Subjective: Patient seen and examined. No new events except as noted.   doing okay     REVIEW OF SYSTEMS:    CONSTITUTIONAL: No weakness, fevers or chills  EYES/ENT: No visual changes;  No vertigo or throat pain   NECK: No pain or stiffness  RESPIRATORY: No cough, wheezing, hemoptysis; No shortness of breath  CARDIOVASCULAR: No chest pain or palpitations  GASTROINTESTINAL: No abdominal or epigastric pain. No nausea, vomiting, or hematemesis; No diarrhea or constipation. No melena or hematochezia.  GENITOURINARY: No dysuria, frequency or hematuria  NEUROLOGICAL: No numbness or weakness  SKIN: No itching, burning, rashes, or lesions   All other review of systems is negative unless indicated above.    MEDICATIONS:  MEDICATIONS  (STANDING):  acetaminophen     Tablet .. 975 milliGRAM(s) Oral every 8 hours  aspirin enteric coated 81 milliGRAM(s) Oral daily  atorvastatin 40 milliGRAM(s) Oral at bedtime  bisacodyl 5 milliGRAM(s) Oral every 12 hours  chlorhexidine 4% Liquid 1 Application(s) Topical <User Schedule>  clopidogrel Tablet 75 milliGRAM(s) Oral daily  dextrose 5%. 1000 milliLiter(s) (100 mL/Hr) IV Continuous <Continuous>  dextrose 5%. 1000 milliLiter(s) (50 mL/Hr) IV Continuous <Continuous>  dextrose 50% Injectable 25 Gram(s) IV Push once  dextrose 50% Injectable 12.5 Gram(s) IV Push once  dextrose 50% Injectable 25 Gram(s) IV Push once  furosemide    Tablet 40 milliGRAM(s) Oral daily  gabapentin 300 milliGRAM(s) Oral every 8 hours  glucagon  Injectable 1 milliGRAM(s) IntraMuscular once  heparin   Injectable 5000 Unit(s) SubCutaneous every 12 hours  insulin glargine Injectable (LANTUS) 18 Unit(s) SubCutaneous at bedtime  insulin lispro (ADMELOG) corrective regimen sliding scale   SubCutaneous three times a day before meals  insulin lispro (ADMELOG) corrective regimen sliding scale   SubCutaneous at bedtime  insulin lispro Injectable (ADMELOG) 10 Unit(s) SubCutaneous three times a day with meals  lisinopril 2.5 milliGRAM(s) Oral at bedtime  methocarbamol 750 milliGRAM(s) Oral every 6 hours  metoprolol succinate ER 25 milliGRAM(s) Oral daily  Nephro-rober 1 Tablet(s) Oral daily  pantoprazole  Injectable 40 milliGRAM(s) IV Push daily  polyethylene glycol 3350 17 Gram(s) Oral daily  senna 2 Tablet(s) Oral at bedtime  spironolactone 25 milliGRAM(s) Oral daily      PHYSICAL EXAM:  T(C): 36.9 (03-22-25 @ 23:52), Max: 36.9 (03-22-25 @ 00:15)  HR: 106 (03-22-25 @ 23:52) (95 - 107)  BP: 102/64 (03-22-25 @ 23:52) (95/62 - 109/74)  RR: 18 (03-22-25 @ 23:52) (18 - 18)  SpO2: 93% (03-22-25 @ 23:52) (92% - 95%)  Wt(kg): --  I&O's Summary    21 Mar 2025 07:01  -  22 Mar 2025 07:00  --------------------------------------------------------  IN: 480 mL / OUT: 650 mL / NET: -170 mL    22 Mar 2025 07:01  -  23 Mar 2025 00:13  --------------------------------------------------------  IN: 960 mL / OUT: 1950 mL / NET: -990 mL          Appearance: Normal	  HEENT:  PERRLA   Lymphatic: No lymphadenopathy   Cardiovascular: Normal S1 S2, no JVD  Respiratory: normal effort , clear  Gastrointestinal:  Soft, Non-tender  Skin: No rashes,  warm to touch  Psychiatry:  Mood & affect appropriate  Musculuskeletal: No edema    recent labs, Imaging and EKGs personally reviewed   CODE status discussed with the patient in detail    03-21-25 @ 07:01  -  03-22-25 @ 07:00  --------------------------------------------------------  IN: 480 mL / OUT: 650 mL / NET: -170 mL    03-22-25 @ 07:01  -  03-23-25 @ 00:13  --------------------------------------------------------  IN: 960 mL / OUT: 1950 mL / NET: -990 mL                          10.2   12.50 )-----------( 237      ( 22 Mar 2025 09:53 )             30.2               03-22    132[L]  |  96  |  25[H]  ----------------------------<  143[H]  4.9   |  26  |  0.96    Ca    8.6      22 Mar 2025 09:53  Phos  2.9     03-21                         Urinalysis Basic - ( 22 Mar 2025 09:53 )    Color: x / Appearance: x / SG: x / pH: x  Gluc: 143 mg/dL / Ketone: x  / Bili: x / Urobili: x   Blood: x / Protein: x / Nitrite: x   Leuk Esterase: x / RBC: x / WBC x   Sq Epi: x / Non Sq Epi: x / Bacteria: x

## 2025-03-22 NOTE — PROGRESS NOTE ADULT - ASSESSMENT
65 YO F with PMHx of HFrEF 30-35 and grade 2 ddfxn (last TTE on 01/16/25), DM2, and diabetic foot ulcer. Recent admission at [facility] for ADHF. Patient now represents to OSH and ultimately to [facility] as a transfer for worsening right leg pain and fluid secretion. As per documentation, patient was reported to have severe depletion of RLE blood flow beyond the popliteal vessel at knee and similar findings in the left. Patient was transferred to [facility] for CO2 angiogram with Dr. Baltazar. Of note, patient also with reported visual disturbance for which patient was seen and evaluated by opthalmology. Internal Medicine has been consulted on Ms. Kirby's care for medical management.       # Foot ulcers with worsening RLE pain second to pop artery occlusion +/- diabetic ulcers   - RLE Arterial Duplex with popliteal artery occlusion   - LLE Arterial Duplex with underlying disease cannot be excluded   - s/p angio with pop and AT VERA on 2/24   - c/b necrosis of RLE   - S/P AKA 3/17   - Continue on Lipitor and DAPT  - Continue pain control with oxy  - Wound care called for dressing recs   - Pain management       # ADHF/ BiV HFrEF 20   - Recent admission at [facility] for ADHF and on dobutamine outpatient  - TTE 1/16 with EF 30-35 with moderately reduced LVSF and grade 2 ddfxn, normal RVSF , trace TR/ SD, and trace pericardial effusion  - RPT TTE with EF 20, severely decreased LV, decreased RVSF with TAPSE 1.2, and regional wall motion abnormalities present with entire septum, entire apex, entire inferior wall, mid inferolateral segment, and mid anterolateral segment are hypokinetic.   - RHC with RA 20, PA 49/29 (40), PCWP 35, mVO2 51.1, CO/CI 3.8/2.2, SVR 1095 w/ Multivessel CAD   - s/p zaroxyln 10 QD to augment diuresis   - s/p bumex GTT   - s/p HTS to augment diuresis   - RPT limited TTE w/ LVSF severely decreased, reduced RVSF, LVOT VTI 12, mild to mod TR, and mildly elevated right atrial pressure  - s/p bumex 4 IV QD, but became anuric and dc'ed   - RPT ECHO post cath with EF 28 %, regional WMA, multiple segmental abnormalities exist, and reduced RVSF    - Case discussed with HF and cards and monitoring off milrinone GTT  - JAMEL as below resolved and urine output improved   - RPT ECHO with EF 31 with severely decreased LVSD and reduced RVSF   - Continue on lasix 40 and aldactone  - Continue on lisinopril 2.5 and toprol 25 for GDMT   - Case discussed with EP and needs to be on GDMT for 3 months before AICD placement and should be stabilized off of inotropic support.    - Monitor I and O   - Monitor volume status   - Check daily weights    - Monitor on telemetry  - Monitor electrolytes   - Cardio, HF, Renal, and EP evals appreciated; F/u recs     # Bradycardia   - SB to 48 BPM at 0400 while asleep on 3/11 and likely related to BB , however last dose prior to event was 3/10 at 0600.  - Resume on BB as above   - Monitor telemetry  - EP eval appreciated; F/u recs     # Tachycardia and Hypoxia  - Tachycardiac and on RA with SPO2 running 90-92   - Could be second to low cardiac output   - CTA with no PE  - Duplex negative for DVT   - On Toprol XL 25 PO QD.   - Monitor HR   - Monitor closely on Tele  - Cardio eval appreciated; F/u recs    # JAMEL with Hyponatremia and Metabolic Acidosis   - Baseline CRE 0.7-1.2 and increased to ~4  - As per OSH documentation, JAMEL was thought to be second to Unasyn/ Bumex / Entresto use and Hypotension   - US RENAL with no hydronephrosis  - Likely cardiorenal induced with low flow state in ADHF, however now rising again and concern for milrinone vs overdiuresis (prerenal) vs cardiorenal.   - Milrinone adjusted and diuresis turned off, however no improvement/ worsened in renal function noted.   - s/p shiley placement and started on HD 2/20  - s/p permacath 3/11  - Passed TOV  - Urine output improved and JAMEL resolved  - Last HD session 3/14  - Plan for permacath removal on 3/24  - Avoid nephrotoxic agents  - Monitor Cr and daily BMP   - Renal and HF evals appreciated; F/u recs    # CAD with TVD   - NST abnormal with small-sized, moderate defect in apical wall that is predominantly fixed suggestive of an infarction with minimal marcus-infarct ischemia. Post stress LVEF 25.  - s/p LHC with RCA , severe ostial LM disease, diffuse disease in LAD and LCx, some L to R collaterals (Multivessel CAD)  - Case discussed with CTSx and NOT a candidate for CABG due to comorbidities  - Cardiac MRI with greater than 75% subendocardial scarring of the basal to mid-inferior wall of the LV and 50% scarring of the left ventricular basal inferoseptal wall. There is also left ventricular transmural scarring involving the apical septal wall and likely near transmural scarring of the apical lateral wall.  - s/p RPT LHC 3/4 with LM 80 s/p POBA/ VERA with LM 1, pLAD 90 s/p POBA/ VERA with pLAD 1, and Cx 80 s/p POBA with Cx 10.   - DAPT and Statin per cardiology   - IC, Cards and HF following     # BL LE Edema likely in setting of ADHF  - Remove volume as per Cardio, HF and Renal   - BL LE elevation and compression  - LE Duplex neg   - Monitor for now    # Pericardial effusion likely in setting ADHF  - TTE with trace pericardial effusion   - CT Chest with small pericardial effusion   - Denies chest pain, palpitations, chest tightness or discomfort, shortness of breath or dyspnea   - Repeat serial TTE for further evaluation   - Diuresis per cardio/ renal.  - Monitor on telemetry  - Cardio following    # Pleural effusion likely in setting ADHF  - CT Chest with small BL pleural effusions  - Monitor O2 saturation  - Supplement to maintain > 90%   - Diuresis / HD per cardio/ renal.     # Hypernatremia to hyponatremia  - Hypernatremia likely from HTS vs over diuresis/ intravascular dehydration. HTS and diuresis stopped with improved sodium   - Hyponatremia now noted second to volume overload   - Avoid overcorrection > 6-8 mEq in 24 hours  - Monitor sodium with HD    # Transaminitis  - Likely 2/2 hepatic congestion   - Volume removal with HD as tolerated  - Trend LFTs, if uptrend post-HD initiation, check ABD US  - Serial ABD Examinations    # Leukocytosis likely in setting of BL LE ulcers with pop occlusion   - BCx negative   - UCx with probable contamination   - S/P unasyn for ABX.   - Leukocytosis fluctuating, however appears nontoxic with no fever spikes and monitoring closely off ABX  - If febrile check pan cultures   - Trend CBC, temp curve, VS and adjust as tolerated      # Visual Disturbance  - CT Head w/ old infarcts  - On ASA and Statin   - Opthalmology eval appreciated    # Indigestion and Constipation   - PPI, miralax and senna continued  - Monitor BMs    # Anemia likely mixed AOCD vs iron deficiency   - HH stable in 9s on HSQ  - Anemia panel with AOCD   - Started on venofer, but ferritin high and now stopped   - Continue on EPO with HD per renal  - Monitor HH   - Transfuse for Hgb < 8  - Maintain active T/S    # Diabetes Mellitus A1C 11.6   - Continue on lantus 13 with lispro 5 and ISS   - Diabetic DASH diet   - Monitor and adjust glucose levels PRN   - Endocrine following; F/u recs     # HLD and HLD  - Continue on lipitor and toprol  - Monitor BP    # DISPO TBD  - Palliative care called and patient remains FULL CODE     D/C planing

## 2025-03-22 NOTE — PROGRESS NOTE ADULT - ASSESSMENT
64y Female with history of CHF presents as a transfer for LE CO2 angiogram. Nephrology consulted for elevated Scr.    1) JAMEL: likely due to ATN for which patient initiated on RRT on 2/20. Last HD on 3/14. JAMEL now resolved. No need for additional HD at this time. S/P TDC placement on 3/11. Plan for TDC remvoval on 3/24. S/P stiles for urinary retention. Avoid nephrotoxins.    2) HTN: BP low normal. Continue with current medications.    3) LE edema: Continue with lasix 40 mg PO daily. Started on aldactone 25 mg daily on 3/20, c/w medication and monitor K+ levels.  TTE with severely decreased LVSF. Monitor UO.     4) Anemia: Hb acceptable with low TSAT. No IV iron given elevated ferritin. Monitor Hb.    5) Hyponatremia: In the setting of hyperglycemia and volume overload. Continue with 1L [facility] NEPHROLOGY  Raji Waterman M.D.  Mars Feliz D.O.  Kelley Parks M.D.  MD Nancy Kulkarni, MSN, ANP-C    Telephone: [phone]  Facsimile: [phone], [address]  [place] [address]

## 2025-03-22 NOTE — PROGRESS NOTE ADULT - SUBJECTIVE AND OBJECTIVE BOX
Full note to follow. [facility] NEPHROLOGY- PROGRESS NOTE    64y Female with history of CHF presents as a transfer for LE CO2 angiogram. Nephrology consulted for elevated Scr.    Patient s/p R AKA on 3/17.   Mild LLE pain    Pt feels well overall.     REVIEW OF SYSTEMS:  Gen: no fevers  Cards: no chest pain  Resp: no dyspnea  GI: no nausea or vomiting or diarrhea  Vascular: no LE edema    Allergies:  Augmentin (Stomach Upset; Vomiting; Nausea)    Hospital Medications: Medications reviewed      Vital Signs Last 24 Hrs  T(C): 36.8 (22 Mar 2025 20:55), Max: 36.9 (22 Mar 2025 00:15)  T(F): 98.3 (22 Mar 2025 20:55), Max: 98.5 (22 Mar 2025 16:14)  HR: 107 (22 Mar 2025 20:55) (95 - 107)  BP: 109/65 (22 Mar 2025 20:55) (95/62 - 109/74)  BP(mean): --  RR: 18 (22 Mar 2025 20:55) (18 - 18)  SpO2: 92% (22 Mar 2025 20:55) (92% - 95%)    Parameters below as of 22 Mar 2025 20:55  Patient On (Oxygen Delivery Method): room air          PHYSICAL EXAM:  Gen: NAD, calm  Cards: RRR, +S1/S2, no M/G/R  Resp: bibasilar rales  GI: soft, NT/ND, NABS  : + stiles  Vascular: R AKA, + LLE edema, + RIJ TDC intact          LABS:  03-22  132 <--, 131 <--, 131 <--, 130 <--, 131 <--, 133 <--  132[L]  |  96  |  25[H]  ----------------------------<  143[H]  4.9   |  26  |  0.96    Ca    8.6      22 Mar 2025 09:53  Phos  2.9     03-21      Creatinine Trend: 0.96 <--, 1.03 <--, 1.06 <--, 0.99 <--, 1.09 <--, 1.47 <--             Hgb Trend: 10.2<--, 10.0<--, 9.7<--, 9.5<--, 10.0<--             10.2   12.50 )-----------( 237      ( 22 Mar 2025 09:53 )             30.2     Urine Studies:  Urinalysis Basic - ( 22 Mar 2025 09:53 )    Color:  / Appearance:  / SG:  / pH:   Gluc: 143 mg/dL / Ketone:   / Bili:  / Urobili:    Blood:  / Protein:  / Nitrite:    Leuk Esterase:  / RBC:  / WBC    Sq Epi:  / Non Sq Epi:  / Bacteria:

## 2025-03-23 LAB
ANION GAP SERPL CALC-SCNC: 14 MMOL/L — SIGNIFICANT CHANGE UP (ref 5–17)
APPEARANCE UR: ABNORMAL
BACTERIA # UR AUTO: ABNORMAL /HPF
BILIRUB UR-MCNC: NEGATIVE — SIGNIFICANT CHANGE UP
BUN SERPL-MCNC: 28 MG/DL — HIGH (ref 7–23)
CALCIUM SERPL-MCNC: 8.4 MG/DL — SIGNIFICANT CHANGE UP (ref 8.4–10.5)
CAST: 17 /LPF — HIGH (ref 0–4)
CHLORIDE SERPL-SCNC: 96 MMOL/L — SIGNIFICANT CHANGE UP (ref 96–108)
CO2 SERPL-SCNC: 21 MMOL/L — LOW (ref 22–31)
COLOR SPEC: YELLOW — SIGNIFICANT CHANGE UP
CREAT SERPL-MCNC: 1.06 MG/DL — SIGNIFICANT CHANGE UP (ref 0.5–1.3)
DIFF PNL FLD: ABNORMAL
EGFR: 59 ML/MIN/1.73M2 — LOW
EGFR: 59 ML/MIN/1.73M2 — LOW
GLUCOSE BLDC GLUCOMTR-MCNC: 105 MG/DL — HIGH (ref 70–99)
GLUCOSE BLDC GLUCOMTR-MCNC: 179 MG/DL — HIGH (ref 70–99)
GLUCOSE BLDC GLUCOMTR-MCNC: 192 MG/DL — HIGH (ref 70–99)
GLUCOSE BLDC GLUCOMTR-MCNC: 93 MG/DL — SIGNIFICANT CHANGE UP (ref 70–99)
GLUCOSE SERPL-MCNC: 164 MG/DL — HIGH (ref 70–99)
GLUCOSE UR QL: NEGATIVE MG/DL — SIGNIFICANT CHANGE UP
HCT VFR BLD CALC: 27.8 % — LOW (ref 34.5–45)
HGB BLD-MCNC: 9.4 G/DL — LOW (ref 11.5–15.5)
KETONES UR-MCNC: NEGATIVE MG/DL — SIGNIFICANT CHANGE UP
LEUKOCYTE ESTERASE UR-ACNC: ABNORMAL
MCHC RBC-ENTMCNC: 25.6 PG — LOW (ref 27–34)
MCHC RBC-ENTMCNC: 33.8 G/DL — SIGNIFICANT CHANGE UP (ref 32–36)
MCV RBC AUTO: 75.7 FL — LOW (ref 80–100)
NITRITE UR-MCNC: NEGATIVE — SIGNIFICANT CHANGE UP
NRBC BLD AUTO-RTO: 0 /100 WBCS — SIGNIFICANT CHANGE UP (ref 0–0)
PH UR: 5.5 — SIGNIFICANT CHANGE UP (ref 5–8)
PLATELET # BLD AUTO: 252 K/UL — SIGNIFICANT CHANGE UP (ref 150–400)
POTASSIUM SERPL-MCNC: 5.4 MMOL/L — HIGH (ref 3.5–5.3)
POTASSIUM SERPL-SCNC: 5.4 MMOL/L — HIGH (ref 3.5–5.3)
PROT UR-MCNC: 30 MG/DL
RBC # BLD: 3.67 M/UL — LOW (ref 3.8–5.2)
RBC # FLD: 22 % — HIGH (ref 10.3–14.5)
RBC CASTS # UR COMP ASSIST: 2 /HPF — SIGNIFICANT CHANGE UP (ref 0–4)
REVIEW: SIGNIFICANT CHANGE UP
SODIUM SERPL-SCNC: 131 MMOL/L — LOW (ref 135–145)
SP GR SPEC: 1.01 — SIGNIFICANT CHANGE UP (ref 1–1.03)
SQUAMOUS # UR AUTO: 3 /HPF — SIGNIFICANT CHANGE UP (ref 0–5)
UROBILINOGEN FLD QL: 0.2 MG/DL — SIGNIFICANT CHANGE UP (ref 0.2–1)
WBC # BLD: 13.63 K/UL — HIGH (ref 3.8–10.5)
WBC # FLD AUTO: 13.63 K/UL — HIGH (ref 3.8–10.5)
WBC UR QL: 834 /HPF — HIGH (ref 0–5)

## 2025-03-23 RX ORDER — CEFTRIAXONE 500 MG/1
1000 INJECTION, POWDER, FOR SOLUTION INTRAMUSCULAR; INTRAVENOUS EVERY 24 HOURS
Refills: 0 | Status: COMPLETED | OUTPATIENT
Start: 2025-03-23 | End: 2025-03-26

## 2025-03-23 RX ORDER — SODIUM ZIRCONIUM CYCLOSILICATE 5 G/5G
5 POWDER, FOR SUSPENSION ORAL ONCE
Refills: 0 | Status: COMPLETED | OUTPATIENT
Start: 2025-03-23 | End: 2025-03-23

## 2025-03-23 RX ORDER — SACUBITRIL AND VALSARTAN 6; 6 MG/1; MG/1
1 PELLET ORAL
Refills: 0 | Status: DISCONTINUED | OUTPATIENT
Start: 2025-03-23 | End: 2025-03-26

## 2025-03-23 RX ADMIN — INSULIN LISPRO 1: 100 INJECTION, SOLUTION INTRAVENOUS; SUBCUTANEOUS at 08:22

## 2025-03-23 RX ADMIN — OXYCODONE HYDROCHLORIDE 5 MILLIGRAM(S): 30 TABLET ORAL at 06:03

## 2025-03-23 RX ADMIN — Medication 2 TABLET(S): at 21:31

## 2025-03-23 RX ADMIN — Medication 975 MILLIGRAM(S): at 06:09

## 2025-03-23 RX ADMIN — FUROSEMIDE 40 MILLIGRAM(S): 10 INJECTION INTRAMUSCULAR; INTRAVENOUS at 05:11

## 2025-03-23 RX ADMIN — CLOPIDOGREL BISULFATE 75 MILLIGRAM(S): 75 TABLET, FILM COATED ORAL at 11:25

## 2025-03-23 RX ADMIN — OXYCODONE HYDROCHLORIDE 5 MILLIGRAM(S): 30 TABLET ORAL at 12:20

## 2025-03-23 RX ADMIN — Medication 1 TABLET(S): at 11:27

## 2025-03-23 RX ADMIN — INSULIN LISPRO 1: 100 INJECTION, SOLUTION INTRAVENOUS; SUBCUTANEOUS at 12:12

## 2025-03-23 RX ADMIN — OXYCODONE HYDROCHLORIDE 5 MILLIGRAM(S): 30 TABLET ORAL at 11:24

## 2025-03-23 RX ADMIN — Medication 975 MILLIGRAM(S): at 05:11

## 2025-03-23 RX ADMIN — ATORVASTATIN CALCIUM 40 MILLIGRAM(S): 80 TABLET, FILM COATED ORAL at 21:30

## 2025-03-23 RX ADMIN — INSULIN GLARGINE-YFGN 18 UNIT(S): 100 INJECTION, SOLUTION SUBCUTANEOUS at 21:31

## 2025-03-23 RX ADMIN — Medication 81 MILLIGRAM(S): at 11:25

## 2025-03-23 RX ADMIN — OXYCODONE HYDROCHLORIDE 5 MILLIGRAM(S): 30 TABLET ORAL at 01:07

## 2025-03-23 RX ADMIN — GABAPENTIN 300 MILLIGRAM(S): 400 CAPSULE ORAL at 05:11

## 2025-03-23 RX ADMIN — OXYCODONE HYDROCHLORIDE 5 MILLIGRAM(S): 30 TABLET ORAL at 02:06

## 2025-03-23 RX ADMIN — OXYCODONE HYDROCHLORIDE 5 MILLIGRAM(S): 30 TABLET ORAL at 07:24

## 2025-03-23 RX ADMIN — METHOCARBAMOL 750 MILLIGRAM(S): 500 TABLET, FILM COATED ORAL at 18:07

## 2025-03-23 RX ADMIN — Medication 5 MILLIGRAM(S): at 05:11

## 2025-03-23 RX ADMIN — Medication 5 MILLIGRAM(S): at 18:07

## 2025-03-23 RX ADMIN — Medication 40 MILLIGRAM(S): at 11:27

## 2025-03-23 RX ADMIN — SODIUM ZIRCONIUM CYCLOSILICATE 5 GRAM(S): 5 POWDER, FOR SUSPENSION ORAL at 11:24

## 2025-03-23 RX ADMIN — OXYCODONE HYDROCHLORIDE 5 MILLIGRAM(S): 30 TABLET ORAL at 04:38

## 2025-03-23 RX ADMIN — Medication 975 MILLIGRAM(S): at 22:45

## 2025-03-23 RX ADMIN — GABAPENTIN 300 MILLIGRAM(S): 400 CAPSULE ORAL at 21:30

## 2025-03-23 RX ADMIN — HEPARIN SODIUM 5000 UNIT(S): 1000 INJECTION INTRAVENOUS; SUBCUTANEOUS at 18:08

## 2025-03-23 RX ADMIN — HEPARIN SODIUM 5000 UNIT(S): 1000 INJECTION INTRAVENOUS; SUBCUTANEOUS at 05:12

## 2025-03-23 RX ADMIN — GABAPENTIN 300 MILLIGRAM(S): 400 CAPSULE ORAL at 14:51

## 2025-03-23 RX ADMIN — INSULIN LISPRO 10 UNIT(S): 100 INJECTION, SOLUTION INTRAVENOUS; SUBCUTANEOUS at 12:12

## 2025-03-23 RX ADMIN — METHOCARBAMOL 750 MILLIGRAM(S): 500 TABLET, FILM COATED ORAL at 11:26

## 2025-03-23 RX ADMIN — Medication 975 MILLIGRAM(S): at 01:06

## 2025-03-23 RX ADMIN — POLYETHYLENE GLYCOL 3350 17 GRAM(S): 17 POWDER, FOR SOLUTION ORAL at 11:27

## 2025-03-23 RX ADMIN — INSULIN LISPRO 10 UNIT(S): 100 INJECTION, SOLUTION INTRAVENOUS; SUBCUTANEOUS at 08:22

## 2025-03-23 RX ADMIN — Medication 975 MILLIGRAM(S): at 21:30

## 2025-03-23 RX ADMIN — METHOCARBAMOL 750 MILLIGRAM(S): 500 TABLET, FILM COATED ORAL at 05:12

## 2025-03-23 RX ADMIN — Medication 1 APPLICATION(S): at 05:06

## 2025-03-23 RX ADMIN — METHOCARBAMOL 750 MILLIGRAM(S): 500 TABLET, FILM COATED ORAL at 23:37

## 2025-03-23 RX ADMIN — Medication 25 MILLIGRAM(S): at 05:11

## 2025-03-23 RX ADMIN — INSULIN LISPRO 10 UNIT(S): 100 INJECTION, SOLUTION INTRAVENOUS; SUBCUTANEOUS at 16:45

## 2025-03-23 RX ADMIN — METHOCARBAMOL 750 MILLIGRAM(S): 500 TABLET, FILM COATED ORAL at 01:07

## 2025-03-23 RX ADMIN — Medication 975 MILLIGRAM(S): at 15:50

## 2025-03-23 RX ADMIN — Medication 975 MILLIGRAM(S): at 14:51

## 2025-03-23 NOTE — PROGRESS NOTE ADULT - ASSESSMENT
65 YO F with PMHx of HFrEF 30-35 and grade 2 ddfxn (last TTE on 01/16/25), DM2, and diabetic foot ulcer. Recent admission at [facility] for ADHF. Patient now represents to OSH and ultimately to [facility] as a transfer for worsening right leg pain and fluid secretion. As per documentation, patient was reported to have severe depletion of RLE blood flow beyond the popliteal vessel at knee and similar findings in the left. Patient was transferred to [facility] for CO2 angiogram with Dr. Baltazar. Of note, patient also with reported visual disturbance for which patient was seen and evaluated by opthalmology. Internal Medicine has been consulted on Ms. Kirby's care for medical management.       # Foot ulcers with worsening RLE pain second to pop artery occlusion +/- diabetic ulcers   - RLE Arterial Duplex with popliteal artery occlusion   - LLE Arterial Duplex with underlying disease cannot be excluded   - s/p angio with pop and AT VERA on 2/24   - c/b necrosis of RLE   - S/P AKA 3/17   - Continue on Lipitor and DAPT  - Continue pain control with oxy  - Wound care called for dressing recs   - Pain management       # ADHF/ BiV HFrEF 20   - Recent admission at [facility] for ADHF and on dobutamine outpatient  - TTE 1/16 with EF 30-35 with moderately reduced LVSF and grade 2 ddfxn, normal RVSF , trace TR/ AL, and trace pericardial effusion  - RPT TTE with EF 20, severely decreased LV, decreased RVSF with TAPSE 1.2, and regional wall motion abnormalities present with entire septum, entire apex, entire inferior wall, mid inferolateral segment, and mid anterolateral segment are hypokinetic.   - RHC with RA 20, PA 49/29 (40), PCWP 35, mVO2 51.1, CO/CI 3.8/2.2, SVR 1095 w/ Multivessel CAD   - s/p zaroxyln 10 QD to augment diuresis   - s/p bumex GTT   - s/p HTS to augment diuresis   - RPT limited TTE w/ LVSF severely decreased, reduced RVSF, LVOT VTI 12, mild to mod TR, and mildly elevated right atrial pressure  - s/p bumex 4 IV QD, but became anuric and dc'ed   - RPT ECHO post cath with EF 28 %, regional WMA, multiple segmental abnormalities exist, and reduced RVSF    - Case discussed with HF and cards and monitoring off milrinone GTT  - JAMEL as below resolved and urine output improved   - RPT ECHO with EF 31 with severely decreased LVSD and reduced RVSF   - Continue on lasix 40 and aldactone  - Continue on lisinopril 2.5 and toprol 25 for GDMT   - Case discussed with EP and needs to be on GDMT for 3 months before AICD placement and should be stabilized off of inotropic support.    - Monitor I and O   - Monitor volume status   - Check daily weights    - Monitor on telemetry  - Monitor electrolytes   - Cardio, HF, Renal, and EP evals appreciated; F/u recs     # Bradycardia   - SB to 48 BPM at 0400 while asleep on 3/11 and likely related to BB , however last dose prior to event was 3/10 at 0600.  - Resume on BB as above   - Monitor telemetry  - EP eval appreciated; F/u recs     # Tachycardia and Hypoxia  - Tachycardiac and on RA with SPO2 running 90-92   - Could be second to low cardiac output   - CTA with no PE  - Duplex negative for DVT   - On Toprol XL 25 PO QD.   - Monitor HR   - Monitor closely on Tele  - Cardio eval appreciated; F/u recs    # JAMEL with Hyponatremia and Metabolic Acidosis   - Baseline CRE 0.7-1.2 and increased to ~4  - As per OSH documentation, JAMEL was thought to be second to Unasyn/ Bumex / Entresto use and Hypotension   - US RENAL with no hydronephrosis  - Likely cardiorenal induced with low flow state in ADHF, however now rising again and concern for milrinone vs overdiuresis (prerenal) vs cardiorenal.   - Milrinone adjusted and diuresis turned off, however no improvement/ worsened in renal function noted.   - s/p shiley placement and started on HD 2/20  - s/p permacath 3/11  - Passed TOV  - Urine output improved and JAMEL resolved  - Last HD session 3/14  - Plan for permacath removal on 3/24  - Avoid nephrotoxic agents  - Monitor Cr and daily BMP   - Renal and HF evals appreciated; F/u recs    # CAD with TVD   - NST abnormal with small-sized, moderate defect in apical wall that is predominantly fixed suggestive of an infarction with minimal marcus-infarct ischemia. Post stress LVEF 25.  - s/p LHC with RCA , severe ostial LM disease, diffuse disease in LAD and LCx, some L to R collaterals (Multivessel CAD)  - Case discussed with CTSx and NOT a candidate for CABG due to comorbidities  - Cardiac MRI with greater than 75% subendocardial scarring of the basal to mid-inferior wall of the LV and 50% scarring of the left ventricular basal inferoseptal wall. There is also left ventricular transmural scarring involving the apical septal wall and likely near transmural scarring of the apical lateral wall.  - s/p RPT LHC 3/4 with LM 80 s/p POBA/ VERA with LM 1, pLAD 90 s/p POBA/ VERA with pLAD 1, and Cx 80 s/p POBA with Cx 10.   - DAPT and Statin per cardiology   - IC, Cards and HF following     # BL LE Edema likely in setting of ADHF  - Remove volume as per Cardio, HF and Renal   - BL LE elevation and compression  - LE Duplex neg   - Monitor for now    # Pericardial effusion likely in setting ADHF  - TTE with trace pericardial effusion   - CT Chest with small pericardial effusion   - Denies chest pain, palpitations, chest tightness or discomfort, shortness of breath or dyspnea   - Repeat serial TTE for further evaluation   - Diuresis per cardio/ renal.  - Monitor on telemetry  - Cardio following    # Pleural effusion likely in setting ADHF  - CT Chest with small BL pleural effusions  - Monitor O2 saturation  - Supplement to maintain > 90%   - Diuresis / HD per cardio/ renal.     # Hypernatremia to hyponatremia  - Hypernatremia likely from HTS vs over diuresis/ intravascular dehydration. HTS and diuresis stopped with improved sodium   - Hyponatremia now noted second to volume overload   - Avoid overcorrection > 6-8 mEq in 24 hours  - Monitor sodium with HD    # Transaminitis  - Likely 2/2 hepatic congestion   - Volume removal with HD as tolerated  - Trend LFTs, if uptrend post-HD initiation, check ABD US  - Serial ABD Examinations    # Leukocytosis likely in setting of BL LE ulcers with pop occlusion   - BCx negative   - UCx with probable contamination   - S/P unasyn for ABX.   - Leukocytosis fluctuating, however appears nontoxic with no fever spikes and monitoring closely off ABX  - If febrile check pan cultures   - Trend CBC, temp curve, VS and adjust as tolerated      # Visual Disturbance  - CT Head w/ old infarcts  - On ASA and Statin   - Opthalmology eval appreciated    # Indigestion and Constipation   - PPI, miralax and senna continued  - Monitor BMs    # Anemia likely mixed AOCD vs iron deficiency   - HH stable in 9s on HSQ  - Anemia panel with AOCD   - Started on venofer, but ferritin high and now stopped   - Continue on EPO with HD per renal  - Monitor HH   - Transfuse for Hgb < 8  - Maintain active T/S    # Diabetes Mellitus A1C 11.6   - Continue on lantus 13 with lispro 5 and ISS   - Diabetic DASH diet   - Monitor and adjust glucose levels PRN   - Endocrine following; F/u recs     # HLD and HLD  - Continue on lipitor and toprol  - Monitor BP    # DISPO TBD  - Palliative care called and patient remains FULL CODE     D/C planing

## 2025-03-23 NOTE — PROGRESS NOTE ADULT - SUBJECTIVE AND OBJECTIVE BOX
Name of Patient : TOMASZ ALBA  MRN: [id number]  Date of visit: 25       Subjective: Patient seen and examined. No new events except as noted.   doing okay   worsening leukocytosis,     REVIEW OF SYSTEMS:    CONSTITUTIONAL: No weakness, fevers or chills  EYES/ENT: No visual changes;  No vertigo or throat pain   NECK: No pain or stiffness  RESPIRATORY: No cough, wheezing, hemoptysis; No shortness of breath  CARDIOVASCULAR: No chest pain or palpitations  GASTROINTESTINAL: No abdominal or epigastric pain. No nausea, vomiting, or hematemesis; No diarrhea or constipation. No melena or hematochezia.  GENITOURINARY: No dysuria, frequency or hematuria  NEUROLOGICAL: No numbness or weakness  SKIN: No itching, burning, rashes, or lesions   All other review of systems is negative unless indicated above.    MEDICATIONS:  MEDICATIONS  (STANDING):  acetaminophen     Tablet .. 975 milliGRAM(s) Oral every 8 hours  aspirin enteric coated 81 milliGRAM(s) Oral daily  atorvastatin 40 milliGRAM(s) Oral at bedtime  bisacodyl 5 milliGRAM(s) Oral every 12 hours  cefTRIAXone   IVPB 1000 milliGRAM(s) IV Intermittent every 24 hours  chlorhexidine 4% Liquid 1 Application(s) Topical <User Schedule>  clopidogrel Tablet 75 milliGRAM(s) Oral daily  dextrose 5%. 1000 milliLiter(s) (100 mL/Hr) IV Continuous <Continuous>  dextrose 5%. 1000 milliLiter(s) (50 mL/Hr) IV Continuous <Continuous>  dextrose 50% Injectable 25 Gram(s) IV Push once  dextrose 50% Injectable 12.5 Gram(s) IV Push once  dextrose 50% Injectable 25 Gram(s) IV Push once  furosemide    Tablet 40 milliGRAM(s) Oral daily  gabapentin 300 milliGRAM(s) Oral every 8 hours  glucagon  Injectable 1 milliGRAM(s) IntraMuscular once  heparin   Injectable 5000 Unit(s) SubCutaneous every 12 hours  insulin glargine Injectable (LANTUS) 18 Unit(s) SubCutaneous at bedtime  insulin lispro (ADMELOG) corrective regimen sliding scale   SubCutaneous three times a day before meals  insulin lispro (ADMELOG) corrective regimen sliding scale   SubCutaneous at bedtime  insulin lispro Injectable (ADMELOG) 10 Unit(s) SubCutaneous three times a day with meals  methocarbamol 750 milliGRAM(s) Oral every 6 hours  metoprolol succinate ER 25 milliGRAM(s) Oral daily  Nephro-rober 1 Tablet(s) Oral daily  pantoprazole  Injectable 40 milliGRAM(s) IV Push daily  polyethylene glycol 3350 17 Gram(s) Oral daily  sacubitril 24 mG/valsartan 26 mG 1 Tablet(s) Oral two times a day  senna 2 Tablet(s) Oral at bedtime  spironolactone 25 milliGRAM(s) Oral daily      PHYSICAL EXAM:  T(C): 36.8 (25 @ 20:47), Max: 37.1 (25 @ 04:27)  HR: 101 (25 @ 20:47) (100 - 110)  BP: 92/57 (25 @ 20:47) (91/52 - 107/53)  RR: 18 (25 @ 20:47) (18 - 18)  SpO2: 98% (25 @ 20:47) (92% - 98%)  Wt(kg): --  I&O's Summary    22 Mar 2025 07:  -  23 Mar 2025 07:00  --------------------------------------------------------  IN: 960 mL / OUT: 1952 mL / NET: -992 mL    23 Mar 2025 07:  -  23 Mar 2025 23:50  --------------------------------------------------------  IN: 1160 mL / OUT: 650 mL / NET: 510 mL          Appearance: Normal	  HEENT:  PERRLA   Lymphatic: No lymphadenopathy   Cardiovascular: Normal S1 S2, no JVD  Respiratory: normal effort , clear  Gastrointestinal:  Soft, Non-tender  Skin: No rashes,  warm to touch  Psychiatry:  Mood & affect appropriate  Musculuskeletal: No edema    recent labs, Imaging and EKGs personally reviewed     25 @ 07:01  -  25 @ 07:00  --------------------------------------------------------  IN: 960 mL / OUT: 1952 mL / NET: -992 mL    25 @ 07:01  -  25 @ 23:50  --------------------------------------------------------  IN: 1160 mL / OUT: 650 mL / NET: 510 mL                          9.4    13.63 )-----------( 252      ( 23 Mar 2025 07:14 )             27.8               03-    131[L]  |  96  |  28[H]  ----------------------------<  164[H]  5.4[H]   |  21[L]  |  1.06    Ca    8.4      23 Mar 2025 07:14                         Urinalysis Basic - ( 23 Mar 2025 17:18 )    Color: Yellow / Appearance: Turbid / S.013 / pH: x  Gluc: x / Ketone: Negative mg/dL  / Bili: Negative / Urobili: 0.2 mg/dL   Blood: x / Protein: 30 mg/dL / Nitrite: Negative   Leuk Esterase: Large / RBC: 2 /HPF /  /HPF   Sq Epi: x / Non Sq Epi: 3 /HPF / Bacteria: Many /HPF

## 2025-03-23 NOTE — PROGRESS NOTE ADULT - SUBJECTIVE AND OBJECTIVE BOX
MR#[id number]  PATIENT NAME:COLE ALBA    DATE OF SERVICE: 03-23-25 @ 08:35  Patient was seen and examined by Yordy Gilmore MD on    03-23-25 @ 08:35 .  Interim events noted.Consultant notes ,Labs,Telemetry reviewed by me       HOSPITAL COURSE: HPI:  64F, hx of CHF (last TTE on 1/16/25 EF 30-35%, G2DD), DM, diabetic foot ulcer with a recent admission at [facility] for CHF exacerbation presented as a transfer for worsening R. leg pain and fluid secretion, On non-invasive imaging demonstrating severe depletion of RLE blood flow beyond the popliteal vessel at knee and similar findings in L. Was transferred to [facility] for CO2 angiogram with Dr. Baltazar. (29 Jan 2025 20:05)      INTERIM EVENTS:Patient seen at bedside ,interim events noted.  03/05-Awake s/p LHC VERA x 1 to LAD, VERA to LM , POBA to CX via RFA  03/18-POD#1 Right AKA  03/19-Awake alert pain controlled-no dyspnea Sinus Rhythm  03/20-Awake afebrile no dyspnea started on low dose ACE  03/22-Awake alert no dyspnea - LLE arterial US results:   Moderate stenosis of the superficial femoral artery in the mid thigh.  High-grade stenosis of the anterior tibial artery in the upper calf.  Segmental occlusion of the mid and distal posterior tibial artery.  03/23-Awake calert pain controlled Sinus Rhythm Good UOP now has Ulises      [facility] -reviewed admission note, no change since admission  HEART FAILURE: Acute[ x]Chronic[ ] Systolic[x ] Diastolic[ ] Combined Systolic and Diastolic[ ]  CAD[x ] CABG[ ] PCI[x ]  DEVICES[ ] PPM[ ] ICD[ ] ILR[ ]  ATRIAL FIBRILLATION[ ] Paroxysmal[ ] Permanent[ ] CHADS2-[  ]  JAMEL[ ] CKD1[ ] CKD2[ ] CKD3[ ] CKD4[ ] ESRD[ ]  COPD[ ] HTN[x ]   DM[x ] Type1[ ] Type 2[x ]   CVA[ ] Paresis[ ]    AMBULATION: Assisted[x ] Cane/walker[ ] Independent[ ]      MEDICATIONS  (STANDING):  acetaminophen     Tablet .. 975 milliGRAM(s) Oral every 8 hours  aspirin enteric coated 81 milliGRAM(s) Oral daily  atorvastatin 40 milliGRAM(s) Oral at bedtime  bisacodyl 5 milliGRAM(s) Oral every 12 hours  chlorhexidine 4% Liquid 1 Application(s) Topical <User Schedule>  clopidogrel Tablet 75 milliGRAM(s) Oral daily  furosemide    Tablet 40 milliGRAM(s) Oral daily  gabapentin 300 milliGRAM(s) Oral every 8 hours  glucagon  Injectable 1 milliGRAM(s) IntraMuscular once  heparin   Injectable 5000 Unit(s) SubCutaneous every 12 hours  insulin glargine Injectable (LANTUS) 18 Unit(s) SubCutaneous at bedtime  insulin lispro (ADMELOG) corrective regimen sliding scale   SubCutaneous three times a day before meals  insulin lispro (ADMELOG) corrective regimen sliding scale   SubCutaneous at bedtime  insulin lispro Injectable (ADMELOG) 10 Unit(s) SubCutaneous three times a day with meals  lisinopril 2.5 milliGRAM(s) Oral at bedtime  methocarbamol 750 milliGRAM(s) Oral every 6 hours  metoprolol succinate ER 25 milliGRAM(s) Oral daily  Nephro-rober 1 Tablet(s) Oral daily  pantoprazole  Injectable 40 milliGRAM(s) IV Push daily  polyethylene glycol 3350 17 Gram(s) Oral daily  senna 2 Tablet(s) Oral at bedtime  spironolactone 25 milliGRAM(s) Oral daily    MEDICATIONS  (PRN):  ALPRAZolam 0.25 milliGRAM(s) Oral daily PRN anxiety  dextrose Oral Gel 15 Gram(s) Oral once PRN Blood Glucose LESS THAN 70 milliGRAM(s)/deciliter  melatonin 3 milliGRAM(s) Oral at bedtime PRN Insomnia  ondansetron Injectable 4 milliGRAM(s) IV Push every 6 hours PRN Nausea and/or Vomiting  oxyCODONE    IR 5 milliGRAM(s) Oral every 4 hours PRN Severe Pain (7 - 10)  sodium chloride 0.65% Nasal 1 Spray(s) Both Nostrils three times a day PRN Dryness  sodium chloride 0.9% lock flush 10 milliLiter(s) IV Push every 1 hour PRN Pre/post blood products, medications, blood draw, and to maintain line patency            REVIEW OF SYSTEMS:  Constitutional: [ ] fever, [ ]weight loss,  [x ]fatigue [ ]weight gain  Eyes: [ ] visual changes  Respiratory: [ ]shortness of breath;  [ ] cough, [ ]wheezing, [ ]chills, [ ]hemoptysis  Cardiovascular: [ ] chest pain, [ ]palpitations, [ ]dizziness,  [ ]leg swelling[ ]orthopnea[ ]PND  Gastrointestinal: [ ] abdominal pain, [ ]nausea, [ ]vomiting,  [ ]diarrhea [ ]Constipation [ ]Melena  Genitourinary: [ ] dysuria, [ ] hematuria [ ][place]  Neurologic: [ ] headaches [ ] tremors[ ]weakness [ ]Paralysis Right[ ] Left[ ]  Skin: [ ] itching, [ ]burning, [ ] rashes  Endocrine: [ ] heat or cold intolerance  Musculoskeletal: [ ] joint pain or swelling; [ ] muscle, back, or extremity pain  Psychiatric: [ ] depression, [ ]anxiety, [ ]mood swings, or [ ]difficulty sleeping  Hematologic: [ ] easy bruising, [ ] bleeding gums    [ ] All remaining systems negative except as per above.   [ ]Unable to obtain.  [x] No change in ROS since admission      Vital Signs Last 24 Hrs  T(C): 37.1 (23 Mar 2025 04:27), Max: 37.1 (23 Mar 2025 04:27)  T(F): 98.7 (23 Mar 2025 04:27), Max: 98.7 (23 Mar 2025 04:27)  HR: 103 (23 Mar 2025 07:42) (97 - 110)  BP: 107/53 (23 Mar 2025 04:27) (95/62 - 109/65)  RR: 18 (23 Mar 2025 04:27) (18 - 18)  SpO2: 92% (23 Mar 2025 04:27) (92% - 95%)    Parameters below as of 23 Mar 2025 04:27  Patient On (Oxygen Delivery Method): room air      I&O's Summary    22 Mar 2025 07:01  -  23 Mar 2025 07:00  --------------------------------------------------------  IN: 960 mL / OUT: 1952 mL / NET: -992 mL        PHYSICAL EXAM:  General: No acute distress BMI-24  HEENT: EOMI, PERRL  Neck: Supple, [ ] JVD  Lungs: Equal air entry bilaterally; [ ] rales [ ] wheezing [ ] rhonchi  Heart: Regular rate and rhythm; [x ] murmur   2/6 [ x] systolic [ ] diastolic [ ] radiation[ ] rubs [ ]  gallops  Abdomen: Nontender, bowel sounds present  Extremities: No clubbing, cyanosis, [ ] edema [x ]Right AKA  Nervous system:  Alert & Oriented X3, no focal deficits  Psychiatric: Normal affect  Skin: No rashes or lesions    LABS:  03-22    132[L]  |  96  |  25[H]  ----------------------------<  143[H]  4.9   |  26  |  0.96    Ca    8.6      22 Mar 2025 09:53      Creatinine Trend: 0.96<--, 1.03<--, 1.06<--, 0.99<--, 1.09<--, 1.47<--                        9.4    13.63 )-----------( 252      ( 23 Mar 2025 07:14 )             27.8        VA Duplex Low Ext Arterial, Ltd, Left (03.21.25 @ 16:22) >  IMPRESSION:    Calcified plaque.  Moderate stenosis of the superficial femoral artery in the mid thigh.  High-grade stenosis of the anterior tibial artery in the upper calf.  Segmental occlusion of the mid and distal posterior tibial artery.  Examination findings were conveyed to nurse practitioner Lucho by  vascular technologist Cameron at 1620 hours on 3/21/2025 with read back.      TTE Limited W or WO Ultrasound Enhancing Agent (03.20.25 @ 17:08) >  CONCLUSIONS:      1. Left ventricular systolic function is severely decreased with an ejection fraction of 31 % by Winchester's method of disks.   2. Multiple segmental abnormalities exist. See findings.   3. Normal right ventricular cavity size and reduced right ventricular systolic function.   4. No pericardial effusion seen.   5. Compared to the transthoracic echocardiogram performed on 3/10/2025, there havebeen no significant interval changes.   6. There is no evidence of a left ventricular thrombus.   7. There is normal LV mass and normal geometry.    12 Lead ECG (03.11.25 @ 04:35) >  SINUS RHYTHM WITHMARKED SINUS ARRHYTHMIA  POSSIBLE ANTERIOR INFARCT , AGE UNDETERMINED  ABNORMAL ECG        Cardiac Catheterization (03.04.25 @ 09:07) >  Conclusions:   Impella placed for HR-PCI (pt was turned down for CABG). EF 25%     Severe proximal LCx stenosis s/p successful rotational atherectomy and balloon angioplasty.   Severe LM and proximal LAD stenosis s/p successful rotational atherectomy, balloon angioplasty, and VERA x2.

## 2025-03-23 NOTE — PROGRESS NOTE ADULT - SUBJECTIVE AND OBJECTIVE BOX
Full note to follow.    If potassium rises further, will consider adding potassium-restriction to diet.    Still plan for HD catheter removal tomorrow. [facility] NEPHROLOGY- PROGRESS NOTE    64y Female with history of CHF presents as a transfer for LE CO2 angiogram. Nephrology consulted for elevated Scr.    Patient s/p R AKA on 3/17.   Mild LLE pain    No complaints today    REVIEW OF SYSTEMS:  Gen: no fevers  Cards: no chest pain  Resp: no dyspnea  GI: no nausea or vomiting or diarrhea  Vascular: no LE edema    Allergies:  Augmentin (Stomach Upset; Vomiting; Nausea)    Hospital Medications: Medications reviewed      VITALS:  T(F): 97.8 (03-23-25 @ 16:42), Max: 98.7 (03-23-25 @ 04:27)  HR: 100 (03-23-25 @ 16:42)  BP: 94/60 (03-23-25 @ 16:42)  RR: 18 (03-23-25 @ 16:42)  SpO2: 97% (03-23-25 @ 16:42)  Wt(kg): --    03-22 @ 07:01  -  03-23 @ 07:00  --------------------------------------------------------  IN: 960 mL / OUT: 1952 mL / NET: -992 mL    03-23 @ 07:01  -  03-23 @ 17:11  --------------------------------------------------------  IN: 1160 mL / OUT: 650 mL / NET: 510 mL        PHYSICAL EXAM:  Gen: NAD, calm  Cards: RRR, +S1/S2, no M/G/R  Resp: bibasilar rales  GI: soft, NT/ND, NABS  : + stiles  Vascular: R AKA, + LLE edema, + RIJ TDC intact        LABS:  03-23    131[L]  |  96  |  28[H]  ----------------------------<  164[H]  5.4[H]   |  21[L]  |  1.06    Ca    8.4      23 Mar 2025 07:14      Creatinine Trend: 1.06 <--, 0.96 <--, 1.03 <--, 1.06 <--, 0.99 <--, 1.09 <--, 1.47 <--                        9.4    13.63 )-----------( 252      ( 23 Mar 2025 07:14 )             27.8     Urine Studies:  Urinalysis Basic - ( 23 Mar 2025 07:14 )    Color:  / Appearance:  / SG:  / pH:   Gluc: 164 mg/dL / Ketone:   / Bili:  / Urobili:    Blood:  / Protein:  / Nitrite:    Leuk Esterase:  / RBC:  / WBC    Sq Epi:  / Non Sq Epi:  / Bacteria:

## 2025-03-23 NOTE — PROGRESS NOTE ADULT - ASSESSMENT
64y Female with history of CHF presents as a transfer for LE CO2 angiogram. Nephrology consulted for elevated Scr.    Mild hyperkalemia- if potassium rises further, will change diet to low potassium.    Pt is still okay for HD catheter removal tomorrow.    1) JAMEL: likely due to ATN for which patient initiated on RRT on 2/20. Last HD on 3/14. JAMEL now resolved. No need for additional HD at this time. S/P TDC placement on 3/11. Plan for TDC remvoval on 3/24. S/P stiles for urinary retention. Avoid nephrotoxins.    2) HTN: BP low normal. Continue with current medications.    3) LE edema: Continue with lasix 40 mg PO daily. Started on aldactone 25 mg daily on 3/20, c/w medication and monitor K+ levels.  TTE with severely decreased LVSF. Monitor UO.     4) Anemia: Hb acceptable with low TSAT. No IV iron given elevated ferritin. Monitor Hb.    5) Hyponatremia: In the setting of hyperglycemia and volume overload. Continue with 1L [facility] NEPHROLOGY  Raji Waterman M.D.  Mars Feliz D.O.  Kelley Parks M.D.  MD Nancy Kulkarni, MSN, ANP-C    Telephone: [phone]  Facsimile: [phone]    [facility] [place] [address], [address]  [place] [address]

## 2025-03-23 NOTE — PROGRESS NOTE ADULT - ASSESSMENT
64F, hx of CHF (last TTE on 1/16/25 EF 30-35%, G2DD), DM, diabetic foot ulcer with a recent admission at [facility] for CHF exacerbation 1/14-1/17 p/w worsening R. leg pain and fluid secretion, was meeting sepsis criteria with podiatry having low suspicion for right cellulitis and admitted for continued management of HF exacerbation and needing ischemic eval.     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  -Left leg Arterial Duplex: Slightly tardus parvus waveform of the left popliteal artery is noted, for which underlying disease cannot be excluded. Posterior tibial artery waveform is nonpulsatile. Anterior tibial artery tardus parvus flow is noted.   Transferred to [facility] for CO2 angiogram as per vascular surgery    #  PAD Wound of lower extremity.   ·  Plan: Podiatry following, b/l serous bullae, no concerns for infection  Found to have RLE coolness  -Right Leg Arterial Duplex: Popliteal artery is occluded with negligible flow of right trifurcation arteries.  -Left leg Arterial Duplex: Slightly tardus parvus waveform of the left popliteal artery is noted, for which underlying disease cannot be excluded. Posterior tibial artery waveform is nonpulsatile. Anterior tibial artery tardus parvus flow is noted.  -Started on heparin gtt and dobutamine gtt -Will transfer to [facility] for CO2 angiogram as per vascular surgery as not candidate for CTA due to worsening SCr  -Keep compression dressing  -LE elevation above heart level at rest.  -Transferred to [facility] for CO2 angiogram   - Overall this patient is at   intermediate  risk (for cardiac death, nonfatal myocardial infarction, and nonfatal cardiac arrest perioperatively for this intermediate  risk procedure).   No cardiac contraindications for CO2 vangiogram  There  are  no further recommendation for risk stratifying imaging/stress testing prior to planned surgery  Seen by Podiatry-No acute pod intervention, local wound care only-   PAD with rest ischemia  s/p AT/Popliteal angioplasty on DAPT  - RLE extensive gangrnous changes to foot dorsum, ankle, heel dorsomedial and lateral lower leg, ischemic changes to 2nd digit and hallux,  Left foot hallux distal tuft well adhered eschar, no acute signs of infection.   -Seen by vascular Plan for AKA   -s/p Right AKA  -LLE Arterial Duplex-Moderate stenosis of the superficial femoral artery in the mid thigh.  High-grade stenosis of the anterior tibial artery in the upper calf.  Segmental occlusion of the mid and distal posterior tibial artery.  -Vascular follow up      # HFrEF (congestive heart failure). Ischemic Cardiomyopathy  ·  Plan: Hx of HF, previous admission in January, on home Lasix 40 qD, Metoprolol Succ 25 qD. Not on Entresto due to insurance issues  Last TTE: LVSF moderately decreased w/ EF 30-35 %. Moderate G2DD. Mild MR  Stress test: small-sized, moderate defect(s) in the apical wall that is predominantly fixed suggestive of an infarction with minimal marcus-infarct ischemia  Ischemic cardiomyopathy  GDMT on hold 2/2 JAMEL  Repeat TTE EF 20% with WMA RAP~~8  Repeat Limited TTE  Taper off Milrinone and start GDMT  Is currently off Hydral/Isdn and Bumex 2/2 soft BP  Continue HD with UF had 2900mL removed yesterday will stop Milrinone and observe  Started  low dose BBlocker Metoprolol tartate 12.5 mg PO q8  03/10-Off Midodrine will reattempt Hyd/Isdn  03/11-Daily HD/UF   03/12-Awake s/p Tunelled HD catheter-Plan for Discharge to [facility]  03/13-EP evaluation Arias episodes  03/14-Increasing necrosis noted Right LE Vascular planning for Right AKA   03/15-Right AKAvs BKA on Monday 03/16- Awake Sinus Rhythm clinically stable for OR tomorrow    03/17-Somnolent not dyspneac for OR today  03/19-POD#2  R AKA No dyspnea BP stable   03/20-POD#3 Stable start GDMT for HF Limited TTE    03/21-Awake afebrile TTE EF 31% no dyspnea Creat 1.03  -HF GDMT-Metoprolol succ 25mg                   - Furosemide 40mg                   - Spironolactone 25 md                   - Lisinopril 2.5 mg  Will attempt trial Entresto        # CAD  LHC-RCA , severe ostial LM disease, diffuse disease in LAD and LCx, some L to R collaterals-seen by CTS advise cMR for viability poor target vessels for CABG-?High risk LM/LAD PCI  Had cMR-LVEF is 25%, greater than 75% subendocardial scarring involving the basal to mid inferior wall of the left ventricle, left ventricular transmural scarring involving the apical septal wall and likely near transmural scarring of the apical lateral wall. 50% scarring of the left ventricular basal inferoseptal wall.  03/05-s/p PCI-LM/LAD   Continue  uninterrupted DAPT        # JAMEL on CKD   Creatinine Trend: 0.96<--1.03 <--0.99<--, 1.09<--1.47 <--1.80<--2.99 <--3.61 <---3.38<--(HD)-- 4.76<--4.07<---3.90<-- 1.17  Has had 3 sessions HD for interrmittent HD to allow contrast based procedures is non oliguric  Plan to continue HD likely will need extended RRT  Srini change on 03/11  Permacath  Been off dialysis since 03/17  Renal function improved  Plan for Permacath removal Monday 03/24

## 2025-03-24 LAB
ANION GAP SERPL CALC-SCNC: 13 MMOL/L — SIGNIFICANT CHANGE UP (ref 5–17)
BUN SERPL-MCNC: 29 MG/DL — HIGH (ref 7–23)
CALCIUM SERPL-MCNC: 8.4 MG/DL — SIGNIFICANT CHANGE UP (ref 8.4–10.5)
CHLORIDE SERPL-SCNC: 96 MMOL/L — SIGNIFICANT CHANGE UP (ref 96–108)
CO2 SERPL-SCNC: 22 MMOL/L — SIGNIFICANT CHANGE UP (ref 22–31)
CREAT SERPL-MCNC: 1.07 MG/DL — SIGNIFICANT CHANGE UP (ref 0.5–1.3)
EGFR: 58 ML/MIN/1.73M2 — LOW
EGFR: 58 ML/MIN/1.73M2 — LOW
GLUCOSE BLDC GLUCOMTR-MCNC: 100 MG/DL — HIGH (ref 70–99)
GLUCOSE BLDC GLUCOMTR-MCNC: 118 MG/DL — HIGH (ref 70–99)
GLUCOSE BLDC GLUCOMTR-MCNC: 154 MG/DL — HIGH (ref 70–99)
GLUCOSE BLDC GLUCOMTR-MCNC: 154 MG/DL — HIGH (ref 70–99)
GLUCOSE SERPL-MCNC: 102 MG/DL — HIGH (ref 70–99)
HCT VFR BLD CALC: 27 % — LOW (ref 34.5–45)
HGB BLD-MCNC: 9.3 G/DL — LOW (ref 11.5–15.5)
MCHC RBC-ENTMCNC: 25.5 PG — LOW (ref 27–34)
MCHC RBC-ENTMCNC: 34.4 G/DL — SIGNIFICANT CHANGE UP (ref 32–36)
MCV RBC AUTO: 74.2 FL — LOW (ref 80–100)
NRBC BLD AUTO-RTO: 0 /100 WBCS — SIGNIFICANT CHANGE UP (ref 0–0)
PLATELET # BLD AUTO: 261 K/UL — SIGNIFICANT CHANGE UP (ref 150–400)
POTASSIUM SERPL-MCNC: 5.3 MMOL/L — SIGNIFICANT CHANGE UP (ref 3.5–5.3)
POTASSIUM SERPL-SCNC: 5.3 MMOL/L — SIGNIFICANT CHANGE UP (ref 3.5–5.3)
RBC # BLD: 3.64 M/UL — LOW (ref 3.8–5.2)
RBC # FLD: 22 % — HIGH (ref 10.3–14.5)
SODIUM SERPL-SCNC: 131 MMOL/L — LOW (ref 135–145)
WBC # BLD: 11.31 K/UL — HIGH (ref 3.8–10.5)
WBC # FLD AUTO: 11.31 K/UL — HIGH (ref 3.8–10.5)

## 2025-03-24 PROCEDURE — 36589 REMOVAL TUNNELED CV CATH: CPT

## 2025-03-24 PROCEDURE — 99232 SBSQ HOSP IP/OBS MODERATE 35: CPT

## 2025-03-24 RX ORDER — INSULIN LISPRO 100 U/ML
9 INJECTION, SOLUTION INTRAVENOUS; SUBCUTANEOUS
Refills: 0 | Status: DISCONTINUED | OUTPATIENT
Start: 2025-03-24 | End: 2025-03-27

## 2025-03-24 RX ORDER — TORSEMIDE 10 MG
20 TABLET ORAL DAILY
Refills: 0 | Status: DISCONTINUED | OUTPATIENT
Start: 2025-03-24 | End: 2025-03-26

## 2025-03-24 RX ORDER — INSULIN GLARGINE-YFGN 100 [IU]/ML
17 INJECTION, SOLUTION SUBCUTANEOUS AT BEDTIME
Refills: 0 | Status: DISCONTINUED | OUTPATIENT
Start: 2025-03-24 | End: 2025-03-27

## 2025-03-24 RX ADMIN — GABAPENTIN 300 MILLIGRAM(S): 400 CAPSULE ORAL at 05:52

## 2025-03-24 RX ADMIN — HEPARIN SODIUM 5000 UNIT(S): 1000 INJECTION INTRAVENOUS; SUBCUTANEOUS at 05:52

## 2025-03-24 RX ADMIN — INSULIN LISPRO 10 UNIT(S): 100 INJECTION, SOLUTION INTRAVENOUS; SUBCUTANEOUS at 09:24

## 2025-03-24 RX ADMIN — Medication 25 MILLIGRAM(S): at 05:52

## 2025-03-24 RX ADMIN — Medication 40 MILLIGRAM(S): at 13:00

## 2025-03-24 RX ADMIN — INSULIN LISPRO 9 UNIT(S): 100 INJECTION, SOLUTION INTRAVENOUS; SUBCUTANEOUS at 17:08

## 2025-03-24 RX ADMIN — OXYCODONE HYDROCHLORIDE 5 MILLIGRAM(S): 30 TABLET ORAL at 01:30

## 2025-03-24 RX ADMIN — CLOPIDOGREL BISULFATE 75 MILLIGRAM(S): 75 TABLET, FILM COATED ORAL at 12:59

## 2025-03-24 RX ADMIN — Medication 5 MILLIGRAM(S): at 16:48

## 2025-03-24 RX ADMIN — GABAPENTIN 300 MILLIGRAM(S): 400 CAPSULE ORAL at 13:16

## 2025-03-24 RX ADMIN — Medication 1 APPLICATION(S): at 06:15

## 2025-03-24 RX ADMIN — Medication 975 MILLIGRAM(S): at 13:02

## 2025-03-24 RX ADMIN — INSULIN GLARGINE-YFGN 17 UNIT(S): 100 INJECTION, SOLUTION SUBCUTANEOUS at 21:38

## 2025-03-24 RX ADMIN — Medication 81 MILLIGRAM(S): at 12:59

## 2025-03-24 RX ADMIN — METHOCARBAMOL 750 MILLIGRAM(S): 500 TABLET, FILM COATED ORAL at 16:48

## 2025-03-24 RX ADMIN — ATORVASTATIN CALCIUM 40 MILLIGRAM(S): 80 TABLET, FILM COATED ORAL at 21:37

## 2025-03-24 RX ADMIN — FUROSEMIDE 40 MILLIGRAM(S): 10 INJECTION INTRAMUSCULAR; INTRAVENOUS at 05:51

## 2025-03-24 RX ADMIN — METHOCARBAMOL 750 MILLIGRAM(S): 500 TABLET, FILM COATED ORAL at 05:51

## 2025-03-24 RX ADMIN — GABAPENTIN 300 MILLIGRAM(S): 400 CAPSULE ORAL at 21:37

## 2025-03-24 RX ADMIN — CEFTRIAXONE 100 MILLIGRAM(S): 500 INJECTION, POWDER, FOR SOLUTION INTRAMUSCULAR; INTRAVENOUS at 07:02

## 2025-03-24 RX ADMIN — Medication 975 MILLIGRAM(S): at 21:37

## 2025-03-24 RX ADMIN — HEPARIN SODIUM 5000 UNIT(S): 1000 INJECTION INTRAVENOUS; SUBCUTANEOUS at 16:47

## 2025-03-24 RX ADMIN — Medication 975 MILLIGRAM(S): at 05:51

## 2025-03-24 RX ADMIN — METHOCARBAMOL 750 MILLIGRAM(S): 500 TABLET, FILM COATED ORAL at 12:59

## 2025-03-24 RX ADMIN — OXYCODONE HYDROCHLORIDE 5 MILLIGRAM(S): 30 TABLET ORAL at 00:15

## 2025-03-24 RX ADMIN — POLYETHYLENE GLYCOL 3350 17 GRAM(S): 17 POWDER, FOR SOLUTION ORAL at 13:00

## 2025-03-24 RX ADMIN — Medication 1 TABLET(S): at 12:59

## 2025-03-24 RX ADMIN — OXYCODONE HYDROCHLORIDE 5 MILLIGRAM(S): 30 TABLET ORAL at 07:02

## 2025-03-24 RX ADMIN — METHOCARBAMOL 750 MILLIGRAM(S): 500 TABLET, FILM COATED ORAL at 23:12

## 2025-03-24 RX ADMIN — INSULIN LISPRO 1: 100 INJECTION, SOLUTION INTRAVENOUS; SUBCUTANEOUS at 17:07

## 2025-03-24 RX ADMIN — Medication 5 MILLIGRAM(S): at 05:51

## 2025-03-24 RX ADMIN — Medication 975 MILLIGRAM(S): at 22:29

## 2025-03-24 RX ADMIN — OXYCODONE HYDROCHLORIDE 5 MILLIGRAM(S): 30 TABLET ORAL at 16:50

## 2025-03-24 RX ADMIN — INSULIN LISPRO 10 UNIT(S): 100 INJECTION, SOLUTION INTRAVENOUS; SUBCUTANEOUS at 12:59

## 2025-03-24 RX ADMIN — Medication 975 MILLIGRAM(S): at 14:02

## 2025-03-24 RX ADMIN — OXYCODONE HYDROCHLORIDE 5 MILLIGRAM(S): 30 TABLET ORAL at 17:50

## 2025-03-24 RX ADMIN — Medication 2 TABLET(S): at 21:37

## 2025-03-24 RX ADMIN — Medication 20 MILLIGRAM(S): at 21:38

## 2025-03-24 NOTE — PRE PROCEDURE NOTE - PRE PROCEDURE EVALUATION
Interventional Radiology    HPI: 64y Female with history of CHF presents as a transfer for LE CO2 angiogram. Nephrology consulted for elevated Scr, HD initiated on this admission. Tunneled HD catheter placed 3/11. Now patient with renal improvement. Patient presenting to IR for tunneled HD catheter removal.     Allergies: No Known Allergies    Medications (Abx/Cardiac/Anticoagulation/Blood Products)    aspirin enteric coated: 81 milliGRAM(s) Oral (03-23 @ 11:25)  cefTRIAXone   IVPB: 100 mL/Hr IV Intermittent (03-24 @ 07:02)  clopidogrel Tablet: 75 milliGRAM(s) Oral (03-23 @ 11:25)  furosemide    Tablet: 40 milliGRAM(s) Oral (03-24 @ 05:51)  heparin   Injectable: 5000 Unit(s) SubCutaneous (03-24 @ 05:52)  lisinopril: 2.5 milliGRAM(s) Oral (03-22 @ 21:41)  spironolactone: 25 milliGRAM(s) Oral (03-24 @ 05:52)    Data:    T(C): 36.9  HR: 100  BP: 93/59  RR: 18  SpO2: 95%    Exam  General: No acute distress  Chest: Non labored breathing  Abdomen: Non-distended  Extremities: No swelling, warm    -WBC 11.31 / HgB 9.3 / Hct 27.0 / Plt 261  -Na 131 / Cl 96 / BUN 28 / Glucose 164  -K 5.4 / CO2 21 / Cr 1.06  -ALT -- / Alk Phos -- / T.Bili --    Imaging:     Plan: 64y Female presents for tunneled HD catheter removal.    -Risks/Benefits/alternatives explained with the patient and/or healthcare proxy and witnessed informed consent obtained.     --  Esa Erickson NP  Interventional Radiology  Available on [employer] TEAMS / [employer]    For EMERGENT inquiries/questions:  IR Pager ([facility]): [phone]    For non-emergent consults/questions:   Please place a sunrise order "Consult- Interventional Radiology" with an appropriate callback number    For questions about scheduling during appropriate work hours, call IR :  [facility]: [phone]    For outpatient IR booking:  [facility]: [phone]

## 2025-03-24 NOTE — PRE PROCEDURE NOTE - GENERAL PROCEDURE NAME
LLE CO2 Angiogram
Right Above Knee Amputation
tunneled dialysis catheter placement
RLE angiogram, possible angioplasty, possible stent
Non tunneled HD catheter placement
Tunneled HD catheter removal

## 2025-03-24 NOTE — PROGRESS NOTE ADULT - SUBJECTIVE AND OBJECTIVE BOX
[facility] NEPHROLOGY- PROGRESS NOTE    64y Female with history of CHF presents as a transfer for LE CO2 angiogram. Nephrology consulted for elevated Scr.    Patient s/p R AKA on 3/17.     REVIEW OF SYSTEMS:  Gen: no fevers  Cards: no chest pain  Resp: no dyspnea  GI: no nausea or vomiting or diarrhea  Vascular: + LLE edema    Augmentin (Stomach Upset; Vomiting; Nausea)  No Known Allergies      Hospital Medications: Medications reviewed        VITALS:  T(F): 97.9 (03-24-25 @ 08:28), Max: 98.6 (03-24-25 @ 00:09)  HR: 96 (03-24-25 @ 08:28)  BP: 90/59 (03-24-25 @ 08:28)  RR: 18 (03-24-25 @ 08:28)  SpO2: 99% (03-24-25 @ 08:28)  Wt(kg): --    03-23 @ 07:01  -  03-24 @ 07:00  --------------------------------------------------------  IN: 1160 mL / OUT: 650 mL / NET: 510 mL        PHYSICAL EXAM:  Gen: NAD, calm  Cards: RRR, +S1/S2, no M/G/R  Resp: bibasilar rales  GI: soft, NT/ND, NABS  : + stiles  Vascular: R AKA, + LLE edema, + RIJ TDC removed with site appear           LABS:  03-24    131[L]  |  96  |  29[H]  ----------------------------<  102[H]  5.3   |  22  |  1.07    Ca    8.4      24 Mar 2025 07:14      Creatinine Trend: 1.07 <--, 1.06 <--, 0.96 <--, 1.03 <--, 1.06 <--, 0.99 <--, 1.09 <--                        9.3    11.31 )-----------( 261      ( 24 Mar 2025 07:14 )             27.0     Urine Studies:  Urinalysis Basic - ( 24 Mar 2025 07:14 )    Color:  / Appearance:  / SG:  / pH:   Gluc: 102 mg/dL / Ketone:   / Bili:  / Urobili:    Blood:  / Protein:  / Nitrite:    Leuk Esterase:  / RBC:  / WBC    Sq Epi:  / Non Sq Epi:  / Bacteria:

## 2025-03-24 NOTE — PROGRESS NOTE ADULT - ASSESSMENT
65 YO F with PMHx of HFrEF 30-35 and grade 2 ddfxn (last TTE on 01/16/25), DM2, and diabetic foot ulcer. Recent admission at [facility] for ADHF. Patient now represents to OSH and ultimately to [facility] as a transfer for worsening right leg pain and fluid secretion. As per documentation, patient was reported to have severe depletion of RLE blood flow beyond the popliteal vessel at knee and similar findings in the left. Patient was transferred to [facility] for CO2 angiogram with Dr. Baltazar. Of note, patient also with reported visual disturbance for which patient was seen and evaluated by opthalmology. Internal Medicine has been consulted on Ms. Kirby's care for medical management.       # Foot ulcers with worsening RLE pain second to pop artery occlusion +/- diabetic ulcers   - RLE Arterial Duplex with popliteal artery occlusion   - LLE Arterial Duplex with underlying disease cannot be excluded   - s/p angio with pop and AT VERA on 2/24   - c/b necrosis of RLE   - S/P AKA 3/17   - 3/21 Duplex w/ Calcified plaque, Moderate stenosis of superficial femoral artery, High-grade stenosis of anterior tibial artery in upper calf, Segmental occlusion of mid and distal posterior tibial artery --> F/u Vascular   - Continue on Lipitor and DAPT  - Continue pain control with oxy  - Wound care called for dressing recs   - Pain management   - Vascular and Podiatry evals appreciated; F/u recs     # ADHF/ BiV HFrEF 20   - Recent admission at [facility] for ADHF and on dobutamine outpatient  - TTE 1/16 with EF 30-35 with moderately reduced LVSF and grade 2 ddfxn, normal RVSF , trace TR/ NJ, and trace pericardial effusion  - RPT TTE with EF 20, severely decreased LV, decreased RVSF with TAPSE 1.2, and regional wall motion abnormalities present with entire septum, entire apex, entire inferior wall, mid inferolateral segment, and mid anterolateral segment are hypokinetic.   - RHC with RA 20, PA 49/29 (40), PCWP 35, mVO2 51.1, CO/CI 3.8/2.2, SVR 1095 w/ Multivessel CAD   - s/p zaroxyln 10 QD to augment diuresis   - s/p bumex GTT   - s/p HTS to augment diuresis   - RPT limited TTE w/ LVSF severely decreased, reduced RVSF, LVOT VTI 12, mild to mod TR, and mildly elevated right atrial pressure  - s/p bumex 4 IV QD, but became anuric and dc'ed   - RPT ECHO post cath with EF 28 %, regional WMA, multiple segmental abnormalities exist, and reduced RVSF    - Case discussed with HF and cards and monitoring off milrinone GTT  - JAMEL as below resolved and urine output improved   - RPT ECHO with EF 31 with severely decreased LVSD and reduced RVSF   - Continue on lasix 40 and aldactone  - Continue on lisinopril 2.5, toprol 25 and started on Entresto for GDMT per Cardio - Monitor BP and VS   - Case discussed with EP and needs to be on GDMT for 3 months before AICD placement and should be stabilized off of inotropic support.    - Monitor I and O   - Monitor volume status   - Check daily weights    - Monitor on telemetry  - Monitor electrolytes   - Cardio, HF, Renal, and EP evals appreciated; F/u recs     # Bradycardia   - SB to 48 BPM at 0400 while asleep on 3/11 and likely related to BB , however last dose prior to event was 3/10 at 0600.  - Resume on BB as above   - Monitor telemetry  - EP eval appreciated; F/u recs     # Tachycardia and Hypoxia  - Tachycardiac and on RA with SPO2 running 90-92   - Could be second to low cardiac output   - CTA with no PE  - Duplex negative for DVT   - On Toprol XL 25 PO QD.   - Monitor HR   - Monitor closely on Tele  - Cardio and Pulm eval appreciated; F/u recs    # JAMEL with Hyponatremia and Metabolic Acidosis   - Baseline CRE 0.7-1.2 and increased to ~4  - As per OSH documentation, JAMEL was thought to be second to Unasyn/ Bumex / Entresto use and Hypotension   - US RENAL with no hydronephrosis  - Likely cardiorenal induced with low flow state in ADHF, however now rising again and concern for milrinone vs overdiuresis (prerenal) vs cardiorenal.   - Milrinone adjusted and diuresis turned off, however no improvement/ worsened in renal function noted.   - s/p shiley placement and started on HD 2/20  - s/p permacath 3/11  - Passed TOV  - Urine output improved and JAMEL resolved  - Last HD session 3/14, S/P permacath removal on 3/24  - Avoid nephrotoxic agents  - Monitor Cr and daily BMP   - Renal and HF evals appreciated; F/u recs    # UTI   - UA +; UCx in lab   - On Rocephin for now pending UCx results and sensitivities  - Monitor and trend CBC, temp curve, VS and adjust as tolerated    # CAD with TVD   - NST abnormal with small-sized, moderate defect in apical wall that is predominantly fixed suggestive of an infarction with minimal marcus-infarct ischemia. Post stress LVEF 25.  - s/p LHC with RCA , severe ostial LM disease, diffuse disease in LAD and LCx, some L to R collaterals (Multivessel CAD)  - Case discussed with CTSx and NOT a candidate for CABG due to comorbidities  - Cardiac MRI with greater than 75% subendocardial scarring of the basal to mid-inferior wall of the LV and 50% scarring of the left ventricular basal inferoseptal wall. There is also left ventricular transmural scarring involving the apical septal wall and likely near transmural scarring of the apical lateral wall.  - s/p RPT LHC 3/4 with LM 80 s/p POBA/ VERA with LM 1, pLAD 90 s/p POBA/ VERA with pLAD 1, and Cx 80 s/p POBA with Cx 10.   - DAPT and Statin per cardiology   - IC, Cards and HF following     # BL LE Edema likely in setting of ADHF  - Remove volume as per Cardio, HF and Renal   - BL LE elevation and compression  - LE Duplex neg   - Monitor for now    # Pericardial effusion likely in setting ADHF  - TTE with trace pericardial effusion   - CT Chest with small pericardial effusion   - Denies chest pain, palpitations, chest tightness or discomfort, shortness of breath or dyspnea   - Repeat serial TTE for further evaluation   - Diuresis per cardio/ renal.  - Monitor on telemetry  - Cardio following    # Pleural effusion likely in setting ADHF  - CT Chest with small BL pleural effusions  - Monitor O2 saturation  - Supplement to maintain > 90%   - Diuresis / HD per cardio/ renal.     # Hypernatremia to hyponatremia  - Hypernatremia likely from HTS vs over diuresis/ intravascular dehydration. HTS and diuresis stopped with improved sodium   - Hyponatremia now noted second to volume overload   - Avoid overcorrection > 6-8 mEq in 24 hours  - Monitor sodium with HD    # Transaminitis  - Likely 2/2 hepatic congestion   - Volume removal with HD as tolerated  - Trend LFTs, if uptrend post-HD initiation, check ABD US  - Serial ABD Examinations    # Leukocytosis likely in setting of BL LE ulcers with pop occlusion   - BCx negative   - UCx with probable contamination   - S/P unasyn for ABX.   - Leukocytosis fluctuating, however appears nontoxic with no fever spikes and monitoring closely off ABX  - If febrile check pan cultures   - Trend CBC, temp curve, VS and adjust as tolerated      # Visual Disturbance  - CT Head w/ old infarcts  - On ASA and Statin   - Opthalmology eval appreciated    # Indigestion and Constipation   - PPI, miralax and senna continued  - Monitor BMs    # Anemia likely mixed AOCD vs iron deficiency   - HH stable in 9s on HSQ  - Anemia panel with AOCD   - Started on venofer, but ferritin high and now stopped   - Continue on EPO with HD per renal  - Monitor HH   - Transfuse for Hgb < 8  - Maintain active T/S    # Diabetes Mellitus A1C 11.6   - Continue on lantus 13 with lispro 5 and ISS   - Diabetic DASH diet   - Monitor and adjust glucose levels PRN   - Endocrine following; F/u recs     # HLD and HLD  - Continue on lipitor and toprol  - Monitor BP    # DISPO TBD  - Palliative care called and patient remains FULL CODE         Discussed with Attending, ACP and Brother Galo in detail

## 2025-03-24 NOTE — PROCEDURE NOTE - PROCEDURE FINDINGS AND DETAILS
indwelling right ij non tunneled dialysis catheter removed over a wire. right ij tunneled dialysis catheter placed. tip in svc. ok to access.
Right neck/chest and existing catheter prepped and draped under usual sterile condition. The catheter was removed, direct jugular pressure applied x10min, hemostasis achieved. A dry sterile dressing applied.

## 2025-03-24 NOTE — PROGRESS NOTE ADULT - ASSESSMENT
63y/o F w/h/o uncontrolled T2DM (A1C 11.6%) on Tresiba and CeQur insulin patch PTA. DM c/b PAD, retinopathy, CKD, CAD. Also h/oType 2 DM, HTN, HLD. Presented to OSH with worsening right leg pain and fluid secretion. Transferred to [facility] for CO2 angiogram. Found to have Low EF to 20% in OSBALDO. s/p high risk PCI on 3/3. s/p R AKA and now on HD. Endocrine consulted for Type 2 DM management, uncontrolled. Pt tolerating POs with BG levels now tightly controlled while on present insulin doses. Will preventively decrease insulin doses to keep BG goal 100-180mg/dL inpatient without hypoglycemia.     Home regimen: Tresiba 40 units, CeQur insulin patch about 2-10 units TID with meals

## 2025-03-24 NOTE — PROGRESS NOTE ADULT - PROBLEM SELECTOR PLAN 1
- Check BG TID AC and HS while on PO diet  - Change Lantus to 17u QHS   - Change Admelog to 9 units ac meals TID AC (HOLD if NPO) slow titration due to ESRD  - C/w low dose Admelog correctional scales TID AC and HS  Discharge Planning:   - Likely basal/bolus regimen given kidney function (doses TBD closer to d/c). Not a candidate for SGLT2 due to leg ulcers and also significantly decreased EGFR., can consider GLP1 in addition to Basal/bolus> will need close f/u with Optho due to h/o retinopathy.   Can send Rx for ozempic 0.25mg weekly x 4weeks and increase to 0.50mg, or mounjaro 2.5mg 2.5mg x4 weeks, then increase to 5.0mg weekly. (Patient denies medullary thyroid cancer, hx of pancreatitis or MEN2)  - Please make sure patient has DM management supplies (glucometer, lancets, strips, alcohol pads, insulin pen needles).  - Patient should check BGs before meals and at bedtime. Contact PCP/endocrinology if BG is less than 70 X1, greater than  400 X1 or persistently greater than 200s.   - Endocrine follow up: can f/u with Dr. Grace, Endocrinology.   - Needs Optho/ renal/cardiac /podiatry and vascular follow up

## 2025-03-24 NOTE — PROGRESS NOTE ADULT - NUTRITIONAL ASSESSMENT
Diet, Regular:   Consistent Carbohydrate {No Snacks} (CSTCHO)  Low Sodium  No Concentrated Phosphorus  Lacto Veg (Accepts Milk & Milk Products) (02-08-25 @ 17:32) [Active]      Please see RD assessment and/or follow up.  Managed by primary team as well
Diet, Regular:   Consistent Carbohydrate {No Snacks} (CSTCHO)  Low Sodium  No Concentrated Phosphorus  Lacto Veg (Accepts Milk & Milk Products) (02-14-25 @ 13:42) [Active]
Diet, Regular:   Consistent Carbohydrate {No Snacks} (CSTCHO)  Low Sodium  No Concentrated Phosphorus (02-05-25 @ 09:56) [Active]
Diet, Regular:   Consistent Carbohydrate {No Snacks} (CSTCHO)  1000mL Fluid Restriction ([id number])  Low Sodium  No Concentrated Phosphorus  Halal  Lacto Veg (Accepts Milk & Milk Products)  Supplement Feeding Modality:  Oral  Nepro Cans or Servings Per Day:  1       Frequency:  Daily (03-06-25 @ 15:14) [Active]
Diet, Regular:   Consistent Carbohydrate {No Snacks} (CSTCHO)  Low Sodium  No Concentrated Phosphorus  Lacto Veg (Accepts Milk & Milk Products) (02-14-25 @ 13:42) [Active]
Diet, Regular:   Consistent Carbohydrate {No Snacks} (CSTCHO)  Low Sodium  No Concentrated Phosphorus (02-05-25 @ 09:56) [Active]
Diet, Regular:   Consistent Carbohydrate {No Snacks} (CSTCHO)  DASH/TLC {Sodium & Cholesterol Restricted} (DASH)  1000mL Fluid Restriction ([id number])  No Concentrated Potassium  Halal  Lacto Veg (Accepts Milk & Milk Products) (03-24-25 @ 09:25) [Active]      Please see RD assessment and/or follow up.  Managed by primary team as well
Diet, Regular:   Consistent Carbohydrate {No Snacks} (CSTCHO)  Low Sodium  No Concentrated Phosphorus  Lacto Veg (Accepts Milk & Milk Products) (02-08-25 @ 17:32) [Active]      Please see RD assessment and/or follow up.  Managed by primary team as well
Diet, Regular:   Consistent Carbohydrate {No Snacks} (CSTCHO)  1000mL Fluid Restriction ([id number])  Low Sodium  No Concentrated Phosphorus  Halal  Lacto Veg (Accepts Milk & Milk Products)  Supplement Feeding Modality:  Oral  Nepro Cans or Servings Per Day:  1       Frequency:  Daily (03-17-25 @ 14:16) [Active]      Please see RD assessment and/or follow up.  Managed by primary team as well
Diet, Regular:   Consistent Carbohydrate {No Snacks} (CSTCHO)  Low Sodium  No Concentrated Phosphorus  Halal  Lacto Veg (Accepts Milk & Milk Products)  Supplement Feeding Modality:  Oral  Nepro Cans or Servings Per Day:  1       Frequency:  Daily (03-04-25 @ 17:11) [Active]
Diet, Regular:   Consistent Carbohydrate {No Snacks} (CSTCHO)  Low Sodium  No Concentrated Phosphorus  Halal  Lacto Veg (Accepts Milk & Milk Products) (02-24-25 @ 14:13) [Active]
Diet, Regular:   Consistent Carbohydrate {No Snacks} (CSTCHO)  Low Sodium  No Concentrated Phosphorus  Halal  Lacto Veg (Accepts Milk & Milk Products) (02-24-25 @ 14:13) [Active]
Diet, Regular:   Consistent Carbohydrate {No Snacks} (CSTCHO)  1000mL Fluid Restriction ([id number])  Low Sodium  No Concentrated Phosphorus  Halal  Lacto Veg (Accepts Milk & Milk Products)  Supplement Feeding Modality:  Oral  Nepro Cans or Servings Per Day:  1       Frequency:  Daily (03-06-25 @ 15:14) [Active]
Diet, Regular:   Consistent Carbohydrate {No Snacks} (CSTCHO)  1000mL Fluid Restriction ([id number])  Low Sodium  No Concentrated Phosphorus  Halal  Lacto Veg (Accepts Milk & Milk Products)  Supplement Feeding Modality:  Oral  Nepro Cans or Servings Per Day:  1       Frequency:  Daily (03-17-25 @ 14:16) [Active]      Please see RD assessment and/or follow up.  Managed by primary team as well
Diet, Regular:   Consistent Carbohydrate {No Snacks} (CSTCHO)  Low Sodium  No Concentrated Phosphorus  Halal  Lacto Veg (Accepts Milk & Milk Products) (02-24-25 @ 14:13) [Active]
Diet, Regular:   Consistent Carbohydrate {No Snacks} (CSTCHO)  1000mL Fluid Restriction ([id number])  Low Sodium  No Concentrated Phosphorus  Halal  Lacto Veg (Accepts Milk & Milk Products)  Supplement Feeding Modality:  Oral  Nepro Cans or Servings Per Day:  1       Frequency:  Daily (03-06-25 @ 15:14) [Active]
Diet, Regular:   Consistent Carbohydrate {No Snacks} (CSTCHO)  1000mL Fluid Restriction ([id number])  Low Sodium  No Concentrated Phosphorus  Halal  Lacto Veg (Accepts Milk & Milk Products)  Supplement Feeding Modality:  Oral  Nepro Cans or Servings Per Day:  1       Frequency:  Daily (03-06-25 @ 15:14) [Active]
Diet, Regular:   Consistent Carbohydrate {No Snacks} (CSTCHO)  Low Sodium  No Concentrated Phosphorus  Lacto Veg (Accepts Milk & Milk Products) (02-14-25 @ 13:42) [Active]

## 2025-03-24 NOTE — PROGRESS NOTE ADULT - SUBJECTIVE AND OBJECTIVE BOX
Name of Patient : TOMASZ ALBA  MRN: [id number]  Date of visit: 03-24-25 @ 14:27      Subjective: Patient seen and examined. No new events except as noted.   Patient seen earlier this AM. Brother Galo at the bedside.   Patient reports feeling better. Pain is controlled per patient. Denies chest pain, palpitations, shortness of breath or dyspnea.   S/P tunneled catheter removal with IR this AM     REVIEW OF SYSTEMS:    CONSTITUTIONAL: Generalized weakness, No fevers or chills  EYES/ENT: No visual changes;  No vertigo or throat pain   NECK: No pain or stiffness  RESPIRATORY: No cough, wheezing, hemoptysis; No shortness of breath  CARDIOVASCULAR: No chest pain or palpitations  GASTROINTESTINAL: No abdominal or epigastric pain. No nausea, vomiting, or hematemesis; No diarrhea or constipation. No melena or hematochezia.  GENITOURINARY: No dysuria, frequency or hematuria  NEUROLOGICAL: No numbness or weakness  SKIN/MSK: Pain controlled S/P Rt AKA   All other review of systems is negative unless indicated above.    MEDICATIONS:  MEDICATIONS  (STANDING):  acetaminophen     Tablet .. 975 milliGRAM(s) Oral every 8 hours  aspirin enteric coated 81 milliGRAM(s) Oral daily  atorvastatin 40 milliGRAM(s) Oral at bedtime  bisacodyl 5 milliGRAM(s) Oral every 12 hours  cefTRIAXone   IVPB 1000 milliGRAM(s) IV Intermittent every 24 hours  chlorhexidine 4% Liquid 1 Application(s) Topical <User Schedule>  clopidogrel Tablet 75 milliGRAM(s) Oral daily  dextrose 5%. 1000 milliLiter(s) (100 mL/Hr) IV Continuous <Continuous>  dextrose 5%. 1000 milliLiter(s) (50 mL/Hr) IV Continuous <Continuous>  dextrose 50% Injectable 25 Gram(s) IV Push once  dextrose 50% Injectable 12.5 Gram(s) IV Push once  dextrose 50% Injectable 25 Gram(s) IV Push once  gabapentin 300 milliGRAM(s) Oral every 8 hours  glucagon  Injectable 1 milliGRAM(s) IntraMuscular once  heparin   Injectable 5000 Unit(s) SubCutaneous every 12 hours  insulin glargine Injectable (LANTUS) 18 Unit(s) SubCutaneous at bedtime  insulin lispro (ADMELOG) corrective regimen sliding scale   SubCutaneous three times a day before meals  insulin lispro (ADMELOG) corrective regimen sliding scale   SubCutaneous at bedtime  insulin lispro Injectable (ADMELOG) 10 Unit(s) SubCutaneous three times a day with meals  methocarbamol 750 milliGRAM(s) Oral every 6 hours  metoprolol succinate ER 25 milliGRAM(s) Oral daily  Nephro-rober 1 Tablet(s) Oral daily  pantoprazole  Injectable 40 milliGRAM(s) IV Push daily  polyethylene glycol 3350 17 Gram(s) Oral daily  sacubitril 24 mG/valsartan 26 mG 1 Tablet(s) Oral two times a day  senna 2 Tablet(s) Oral at bedtime  spironolactone 25 milliGRAM(s) Oral daily  torsemide 20 milliGRAM(s) Oral daily      PHYSICAL EXAM:  T(C): 36.6 (03-24-25 @ 08:28), Max: 37 (03-24-25 @ 00:09)  HR: 96 (03-24-25 @ 08:28) (96 - 101)  BP: 90/59 (03-24-25 @ 08:28) (90/59 - 117/65)  RR: 18 (03-24-25 @ 08:28) (18 - 18)  SpO2: 99% (03-24-25 @ 08:28) (94% - 99%)  Wt(kg): --  I&O's Summary    23 Mar 2025 07:01  -  24 Mar 2025 07:00  --------------------------------------------------------  IN: 1160 mL / OUT: 650 mL / NET: 510 mL    24 Mar 2025 07:01  -  24 Mar 2025 14:27  --------------------------------------------------------  IN: 250 mL / OUT: 0 mL / NET: 250 mL          Appearance: Awake, sitting up in bed 	  HEENT:  Eyes are open   Lymphatic: No lymphadenopathy grossly   Cardiovascular: Normal   Respiratory: normal effort , clear  Gastrointestinal:  Soft, Non-tender  Skin: No rashes,  warm to touch  Psychiatry:  Mood & affect appropriate  Musculoskeletal: SP Rt AKA            03-23-25 @ 07:01  -  03-24-25 @ 07:00  --------------------------------------------------------  IN: 1160 mL / OUT: 650 mL / NET: 510 mL    03-24-25 @ 07:01  -  03-24-25 @ 14:27  --------------------------------------------------------  IN: 250 mL / OUT: 0 mL / NET: 250 mL                                9.3    11.31 )-----------( 261      ( 24 Mar 2025 07:14 )             27.0               03-24    131[L]  |  96  |  29[H]  ----------------------------<  102[H]  5.3   |  22  |  1.07    Ca    8.4      24 Mar 2025 07:14                         Urinalysis Basic - ( 24 Mar 2025 07:14 )    Color: x / Appearance: x / SG: x / pH: x  Gluc: 102 mg/dL / Ketone: x  / Bili: x / Urobili: x   Blood: x / Protein: x / Nitrite: x   Leuk Esterase: x / RBC: x / WBC x   Sq Epi: x / Non Sq Epi: x / Bacteria: x        < from: TTE Limited W or WO Ultrasound Enhancing Agent (03.20.25 @ 17:08) >     CONCLUSIONS:      1. Left ventricular systolic function is severely decreased with an ejection fraction of 31 % by Winchester's method of disks.   2. Multiple segmental abnormalities exist. See findings.   3. Normal right ventricular cavity size and reduced right ventricular systolic function.   4. No pericardial effusion seen.   5. Compared to the transthoracic echocardiogram performed on 3/10/2025, there havebeen no significant interval changes.   6. There is no evidence of a left ventricular thrombus.   7. There is normal LV mass and normal geometry.    < end of copied text >      < from: VA Duplex Low Ext Arterial, Ltd, Left (03.21.25 @ 16:22) >    IMPRESSION:    Calcified plaque.    Moderate stenosis of the superficial femoral artery in the mid thigh.    High-grade stenosis of the anterior tibial artery in the upper calf.    Segmental occlusion of the mid and distal posterior tibial artery.    Examination findings were conveyed to nurse ellie Yepez by   vascular technologist Cameron at 1620 hours on 3/21/2025 with read back.    < end of copied text >

## 2025-03-24 NOTE — PROGRESS NOTE ADULT - ASSESSMENT
65 y/o F with PMH of CHF (last TTE 1/2025 with EF 30-35%), DM, diabetic foot ulcer, PAD, CAD. Recent admission at [facility] for CHF exacerbation, presented to [facility] as a transfer for worsening R leg pain. Hospital course c/b acute on chronic CHF - TTE with EF 20%, severely decreased LV and RV function, multiple regional motion abnormalities, s/p LHC revealing TVD, pleural/pericardial effusions, JAMEL, leukocytosis suspected to be in the setting of LE wound on IV ABX, persistent RLE pain w/ RLE Arterial Duplex revealing popliteal artery occlusion  Plan for eventual angiogram with vascular when medically optimized. Pulmonary called to consult as pt with c/o SOB.

## 2025-03-24 NOTE — PROGRESS NOTE ADULT - SUBJECTIVE AND OBJECTIVE BOX
DIABETES FOLLOW UP NOTE: Saw pt earlier today    Chief Complaint: Endocrine consult requested for management of DM    INTERVAL HX: Pt stable, reports feeling much better with less surgical pain and still eating 100% of her meals. BG now tightly controlled between 90s to low 100s. Had BG 93 ac dinner yesterday> pt states she eats everything in her tray.  No hypoglycemia.         Review of Systems:  General: As above  Cardiovascular: No chest pain, palpitations  Respiratory: No SOB, no cough  GI: No nausea, vomiting, abdominal pain  Endocrine: No polyuria, polydipsia or S&Sx of hypoglycemia    Allergies    No Known Allergies    Intolerances    Augmentin (Stomach Upset; Vomiting; Nausea)    MEDICATIONS:  atorvastatin 40 milliGRAM(s) Oral at bedtime  cefTRIAXone   IVPB 1000 milliGRAM(s) IV Intermittent every 24 hours  insulin glargine Injectable (LANTUS) 18 Unit(s) SubCutaneous at bedtime  insulin lispro (ADMELOG) corrective regimen sliding scale   SubCutaneous three times a day before meals  insulin lispro (ADMELOG) corrective regimen sliding scale   SubCutaneous at bedtime  insulin lispro Injectable (ADMELOG) 10 Unit(s) SubCutaneous three times a day with meals      PHYSICAL EXAM:  VITALS: T(C): 36.8 (03-24-25 @ 12:00)  T(F): 98.2 (03-24-25 @ 12:00), Max: 98.6 (03-24-25 @ 00:09)  HR: 86 (03-24-25 @ 12:00) (86 - 101)  BP: 108/68 (03-24-25 @ 12:00) (90/59 - 117/65)  RR:  (18 - 18)  SpO2:  (94% - 99%)  Wt(kg): --  GENERAL: Female laying bed in NAD  Chest: R chest HD cath in place D&I  Abdomen: Soft, nontender, non distended  Extremities: Warm, no edema in all 4 exts. R AKA with ace bandage D&I, L foot dressing D&I  NEURO: Alert and able to answer all questions        LABS:  POCT Blood Glucose.: 118 mg/dL (03-24-25 @ 12:01)  POCT Blood Glucose.: 100 mg/dL (03-24-25 @ 09:15)  POCT Blood Glucose.: 105 mg/dL (03-23-25 @ 21:24)  POCT Blood Glucose.: 93 mg/dL (03-23-25 @ 16:29)  POCT Blood Glucose.: 192 mg/dL (03-23-25 @ 11:59)  POCT Blood Glucose.: 179 mg/dL (03-23-25 @ 07:53)  POCT Blood Glucose.: 116 mg/dL (03-22-25 @ 21:46)  POCT Blood Glucose.: 125 mg/dL (03-22-25 @ 16:37)  POCT Blood Glucose.: 147 mg/dL (03-22-25 @ 11:55)  POCT Blood Glucose.: 148 mg/dL (03-22-25 @ 08:03)  POCT Blood Glucose.: 145 mg/dL (03-21-25 @ 21:48)  POCT Blood Glucose.: 133 mg/dL (03-21-25 @ 16:46)                            9.3    11.31 )-----------( 261      ( 24 Mar 2025 07:14 )             27.0       03-24    131[L]  |  96  |  29[H]  ----------------------------<  102[H]  5.3   |  22  |  1.07    eGFR: 58[L]    Ca    8.4      03-24       A1C with Estimated Average Glucose Result: 11.6 % (01-24-25 @ 05:40)      Estimated Average Glucose: 286 mg/dL (01-24-25 @ 05:40)        01-24 Chol 122 Direct LDL -- LDL calculated 66 HDL 39[L] Trig 89

## 2025-03-24 NOTE — PROGRESS NOTE ADULT - ASSESSMENT
64F, hx of CHF (last TTE on 1/16/25 EF 30-35%, G2DD), DM, diabetic foot ulcer with a recent admission at [facility] for CHF exacerbation 1/14-1/17 p/w worsening R. leg pain and fluid secretion, was meeting sepsis criteria with podiatry having low suspicion for right cellulitis and admitted for continued management of HF exacerbation and needing ischemic eval.     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  -Left leg Arterial Duplex: Slightly tardus parvus waveform of the left popliteal artery is noted, for which underlying disease cannot be excluded. Posterior tibial artery waveform is nonpulsatile. Anterior tibial artery tardus parvus flow is noted.   Transferred to [facility] for CO2 angiogram as per vascular surgery    #  PAD Wound of lower extremity.   ·  Plan: Podiatry following, b/l serous bullae, no concerns for infection  Found to have RLE coolness  -Right Leg Arterial Duplex: Popliteal artery is occluded with negligible flow of right trifurcation arteries.  -Left leg Arterial Duplex: Slightly tardus parvus waveform of the left popliteal artery is noted, for which underlying disease cannot be excluded. Posterior tibial artery waveform is nonpulsatile. Anterior tibial artery tardus parvus flow is noted.  -Started on heparin gtt and dobutamine gtt -Will transfer to [facility] for CO2 angiogram as per vascular surgery as not candidate for CTA due to worsening SCr  -Keep compression dressing  -LE elevation above heart level at rest.  -Transferred to [facility] for CO2 angiogram   - Overall this patient is at   intermediate  risk (for cardiac death, nonfatal myocardial infarction, and nonfatal cardiac arrest perioperatively for this intermediate  risk procedure).   No cardiac contraindications for CO2 vangiogram  There  are  no further recommendation for risk stratifying imaging/stress testing prior to planned surgery  Seen by Podiatry-No acute pod intervention, local wound care only-   PAD with rest ischemia  s/p AT/Popliteal angioplasty on DAPT  - RLE extensive gangrnous changes to foot dorsum, ankle, heel dorsomedial and lateral lower leg, ischemic changes to 2nd digit and hallux,  Left foot hallux distal tuft well adhered eschar, no acute signs of infection.   -Seen by vascular Plan for AKA   -s/p Right AKA  -LLE Arterial Duplex-Moderate stenosis of the superficial femoral artery in the mid thigh.  High-grade stenosis of the anterior tibial artery in the upper calf.  Segmental occlusion of the mid and distal posterior tibial artery.  -Vascular follow up      # HFrEF (congestive heart failure). Ischemic Cardiomyopathy  ·  Plan: Hx of HF, previous admission in January, on home Lasix 40 qD, Metoprolol Succ 25 qD. Not on Entresto due to insurance issues  Last TTE: LVSF moderately decreased w/ EF 30-35 %. Moderate G2DD. Mild MR  Stress test: small-sized, moderate defect(s) in the apical wall that is predominantly fixed suggestive of an infarction with minimal marcus-infarct ischemia  Ischemic cardiomyopathy  GDMT on hold 2/2 JAMEL  Repeat TTE EF 20% with WMA RAP~~8  Repeat Limited TTE  Taper off Milrinone and start GDMT  Is currently off Hydral/Isdn and Bumex 2/2 soft BP  Continue HD with UF had 2900mL removed yesterday will stop Milrinone and observe  Started  low dose BBlocker Metoprolol tartate 12.5 mg PO q8  03/10-Off Midodrine will reattempt Hyd/Isdn  03/11-Daily HD/UF   03/12-Awake s/p Tunelled HD catheter-Plan for Discharge to [facility]  03/13-EP evaluation Arias episodes  03/14-Increasing necrosis noted Right LE Vascular planning for Right AKA   03/15-Right AKAvs BKA on Monday 03/16- Awake Sinus Rhythm clinically stable for OR tomorrow    03/17-Somnolent not dyspneac for OR today  03/19-POD#2  R AKA No dyspnea BP stable   03/20-POD#3 Stable start GDMT for HF Limited TTE    03/21-Awake afebrile TTE EF 31% no dyspnea Creat 1.03  -HF GDMT-Metoprolol succ 25mg                   - Furosemide 40mg                   - Spironolactone 25 md                   - Lisinopril 2.5 mg  03/23-Started on Entresto        # CAD  [facility]-RCA , severe ostial LM disease, diffuse disease in LAD and LCx, some L to R collaterals-seen by CTS advise cMR for viability poor target vessels for CABG-?High risk LM/LAD PCI  Had cMR-LVEF is 25%, greater than 75% subendocardial scarring involving the basal to mid inferior wall of the left ventricle, left ventricular transmural scarring involving the apical septal wall and likely near transmural scarring of the apical lateral wall. 50% scarring of the left ventricular basal inferoseptal wall.  03/05-s/p PCI-LM/LAD   Continue  uninterrupted DAPT        # JAMEL on CKD   Creatinine Trend:1.06<-- 0.96<--1.03 <--0.99<--, 1.09<--1.47 <--1.80<--2.99 <--3.61 <---3.38<--(HD)-- 4.76<--4.07<---3.90<-- 1.17  Has had 3 sessions HD for interrmittent HD to allow contrast based procedures is non oliguric  Plan to continue HD likely will need extended RRT  Srini change on 03/11  Permacath  Been off dialysis since 03/17  Renal function improved  Plan for Permacath removal Monday 03/24

## 2025-03-24 NOTE — H&P ADULT - PROBLEM SELECTOR PLAN 5
Patient came to clinic for anticoagulation monitoring.  Last INR on 3/17 was 5.6.  Pt held warfarin x 3 days.  Pt was prescribed doxycycline 3/4 x 10 days, per Dynamedex can increase risk of bleeding with warfarin.  Today's INR is 1.8 and is below goal range. INR goal range 2.5-3.5.  Current warfarin total weekly dose of 27.5 mg verified.  Informed the INR result is below therapeutic range and instructed to take a one-time increased dose of 5 mg tonight. Discussed dose and return date of 2 weeks for next INR. See Anticoagulation flowsheet.    Patient denies bleeding or bruising. Pt denies changes to medications or diet. Pt ambulated to clinic without assistive device.    Dr. Doss is in the office today supervising the treatment. Nat Kaye DO is referring provider.    Call your physician immediately if you notice any of the following symptoms of a blood clot:   Sudden weakness in any limb  Numbness or tingling anywhere  Visual changes or loss of sight in either eye  Sudden onset of slurred speech or inability to speak  Dizziness or faintness  New pain, swelling, redness or heat in any extremity  New SOB or chest pain  Symptoms associated with blood clotting/low INR reviewed and verbalizes understanding.    Instructed to contact the clinic with any unusual bleeding or bruising, any changes in medications, diet, health status, lifestyle, or any other changes, questions or concerns. Verbalized understanding of all discussed.      Hx of T2DM with insulin pump, using 6-8u TID; Does not take Tresiba 30u  Previous A1c in December >15.5    -Will restart previous insulin regimen of Lantus 30 Hx of T2DM with insulin pump, using 6-8u TID; Does not take Tresiba 30u  Previous A1c in December >15.5  Endo consulted, Dr. Bhagat    -Will restart previous insulin regimen of Lantus 30 and Lispro 10TID  -f/u Endo recs Hx of T2DM with insulin pump, using 6-8u TID; Does not take Tresiba 30u  Previous A1c in December >15.5  Endo consulted, Dr. Bhagat    -Will restart previous insulin regimen of Lantus 30 + ISS & Lispro 10TID + ISS  -f/u Endo recs

## 2025-03-24 NOTE — PRE PROCEDURE NOTE - PROCEDURE SERVICE
Interventional Radiology
Vascular Surgery
Vascular Surgery/Surgery
Vascular Surgery
Vascular and Interventional Radiology
Vascular and Interventional Radiology

## 2025-03-24 NOTE — PROGRESS NOTE ADULT - ASSESSMENT
64y Female with history of CHF presents as a transfer for LE CO2 angiogram. Nephrology consulted for elevated Scr.    1) JAMEL: likely due to ATN for which patient initiated on RRT with last HD on 3/14. JAMEL resolved. No need for additional HD at this time. S/P TDC removal this morning. S/P stiles for urinary retention. Avoid nephrotoxins.    2) HTN: BP low normal. Continue with current medications.    3) LE edema: With suboptimal UO. Change lasix to torsemide 20 mg daily and continue with aldactone. Will add farxiga prior to discharge. TTE with severely decreased LVSF. Monitor UO.     4) Anemia: Hb acceptable with low TSAT. No IV iron given elevated ferritin. S/P Epo 8K X 1 dose 3/12. Monitor Hb.    5) Hyponatremia: In the setting of hyperglycemia and volume overload. Diuretics as above. Continue with 1L FR.      Start dietary potassium restriction given high normal serum potassium. If patient scheduled for LLE angiogram, would hold loop diuretics the day of the procedure to minimize risk of LILIAN.      [facility] NEPHROLOGY  Raji Waterman M.D.  Mars Feliz D.O.  Kelley Parks M.D.  MD Nancy Kulkarni, MSN, ANP-C    Telephone: [phone]  Facsimile: [phone] [address] [place] [address], [id number]  [place] [address]

## 2025-03-24 NOTE — PROGRESS NOTE ADULT - SUBJECTIVE AND OBJECTIVE BOX
Follow-up Pulm Progress Note    No new respiratory events overnight.  Denies SOB/CP.     Medications:  MEDICATIONS  (STANDING):  acetaminophen     Tablet .. 975 milliGRAM(s) Oral every 8 hours  aspirin enteric coated 81 milliGRAM(s) Oral daily  atorvastatin 40 milliGRAM(s) Oral at bedtime  bisacodyl 5 milliGRAM(s) Oral every 12 hours  cefTRIAXone   IVPB 1000 milliGRAM(s) IV Intermittent every 24 hours  chlorhexidine 4% Liquid 1 Application(s) Topical <User Schedule>  clopidogrel Tablet 75 milliGRAM(s) Oral daily  dextrose 5%. 1000 milliLiter(s) (100 mL/Hr) IV Continuous <Continuous>  dextrose 5%. 1000 milliLiter(s) (50 mL/Hr) IV Continuous <Continuous>  dextrose 50% Injectable 25 Gram(s) IV Push once  dextrose 50% Injectable 12.5 Gram(s) IV Push once  dextrose 50% Injectable 25 Gram(s) IV Push once  gabapentin 300 milliGRAM(s) Oral every 8 hours  glucagon  Injectable 1 milliGRAM(s) IntraMuscular once  heparin   Injectable 5000 Unit(s) SubCutaneous every 12 hours  insulin glargine Injectable (LANTUS) 18 Unit(s) SubCutaneous at bedtime  insulin lispro (ADMELOG) corrective regimen sliding scale   SubCutaneous three times a day before meals  insulin lispro (ADMELOG) corrective regimen sliding scale   SubCutaneous at bedtime  insulin lispro Injectable (ADMELOG) 10 Unit(s) SubCutaneous three times a day with meals  methocarbamol 750 milliGRAM(s) Oral every 6 hours  metoprolol succinate ER 25 milliGRAM(s) Oral daily  Nephro-rober 1 Tablet(s) Oral daily  pantoprazole  Injectable 40 milliGRAM(s) IV Push daily  polyethylene glycol 3350 17 Gram(s) Oral daily  sacubitril 24 mG/valsartan 26 mG 1 Tablet(s) Oral two times a day  senna 2 Tablet(s) Oral at bedtime  spironolactone 25 milliGRAM(s) Oral daily  torsemide 20 milliGRAM(s) Oral daily    MEDICATIONS  (PRN):  ALPRAZolam 0.25 milliGRAM(s) Oral daily PRN anxiety  dextrose Oral Gel 15 Gram(s) Oral once PRN Blood Glucose LESS THAN 70 milliGRAM(s)/deciliter  melatonin 3 milliGRAM(s) Oral at bedtime PRN Insomnia  ondansetron Injectable 4 milliGRAM(s) IV Push every 6 hours PRN Nausea and/or Vomiting  oxyCODONE    IR 5 milliGRAM(s) Oral every 4 hours PRN Severe Pain (7 - 10)  sodium chloride 0.65% Nasal 1 Spray(s) Both Nostrils three times a day PRN Dryness  sodium chloride 0.9% lock flush 10 milliLiter(s) IV Push every 1 hour PRN Pre/post blood products, medications, blood draw, and to maintain line patency          Vital Signs Last 24 Hrs  T(C): 36.6 (24 Mar 2025 08:28), Max: 37 (24 Mar 2025 00:09)  T(F): 97.9 (24 Mar 2025 08:28), Max: 98.6 (24 Mar 2025 00:09)  HR: 96 (24 Mar 2025 08:28) (96 - 101)  BP: 90/59 (24 Mar 2025 08:28) (90/59 - 117/65)  BP(mean): --  RR: 18 (24 Mar 2025 08:28) (18 - 18)  SpO2: 99% (24 Mar 2025 08:28) (94% - 99%)    Parameters below as of 24 Mar 2025 07:58  Patient On (Oxygen Delivery Method): room air              03-23 @ 07:01  -  03-24 @ 07:00  --------------------------------------------------------  IN: 1160 mL / OUT: 650 mL / NET: 510 mL          LABS:                        9.3    11.31 )-----------( 261      ( 24 Mar 2025 07:14 )             27.0     03-24    131[L]  |  96  |  29[H]  ----------------------------<  102[H]  5.3   |  22  |  1.07    Ca    8.4      24 Mar 2025 07:14            CAPILLARY BLOOD GLUCOSE      POCT Blood Glucose.: 100 mg/dL (24 Mar 2025 09:15)      Urinalysis Basic - ( 24 Mar 2025 07:14 )    Color: x / Appearance: x / SG: x / pH: x  Gluc: 102 mg/dL / Ketone: x  / Bili: x / Urobili: x   Blood: x / Protein: x / Nitrite: x   Leuk Esterase: x / RBC: x / WBC x   Sq Epi: x / Non Sq Epi: x / Bacteria: x              Physical Examination:  PULM: grossly clear  CVS: S1, S2 heard    RADIOLOGY REVIEWED  CXR: mild congestion

## 2025-03-24 NOTE — PROGRESS NOTE ADULT - SUBJECTIVE AND OBJECTIVE BOX
MR#[id number]  PATIENT NAME:COLE ALBA    DATE OF SERVICE: 03-24-25 @ 07:14  Patient was seen and examined by Yordy Gilmore MD on    03-24-25 @ 07:14 .  Interim events noted.Consultant notes ,Labs,Telemetry reviewed by me       HOSPITAL COURSE: HPI:  64F, hx of CHF (last TTE on 1/16/25 EF 30-35%, G2DD), DM, diabetic foot ulcer with a recent admission at [facility] for CHF exacerbation presented as a transfer for worsening R. leg pain and fluid secretion, On non-invasive imaging demonstrating severe depletion of RLE blood flow beyond the popliteal vessel at knee and similar findings in L. Was transferred to [facility] for CO2 angiogram with Dr. Baltazar. (29 Jan 2025 20:05)      INTERIM EVENTS:Patient seen at bedside ,interim events noted.  03/05-Awake s/p LHC VERA x 1 to LAD, VERA to LM , POBA to CX via RFA  03/18-POD#1 Right AKA  03/22-Awake alert no dyspnea - LLE arterial US results:   Moderate stenosis of the superficial femoral artery in the mid thigh.  High-grade stenosis of the anterior tibial artery in the upper calf.  Segmental occlusion of the mid and distal posterior tibial artery.  03/23-Awake calert pain controlled Sinus Rhythm Good UOP now has Montgomery    03/24-Awake not orthopneac good UOP Awaiting Vascular follow up-Left LE-for Permacath removal      MEDICATIONS  (STANDING):  acetaminophen     Tablet .. 975 milliGRAM(s) Oral every 8 hours  aspirin enteric coated 81 milliGRAM(s) Oral daily  atorvastatin 40 milliGRAM(s) Oral at bedtime  bisacodyl 5 milliGRAM(s) Oral every 12 hours  cefTRIAXone   IVPB 1000 milliGRAM(s) IV Intermittent every 24 hours  chlorhexidine 4% Liquid 1 Application(s) Topical <User Schedule>  clopidogrel Tablet 75 milliGRAM(s) Oral daily  furosemide    Tablet 40 milliGRAM(s) Oral daily  gabapentin 300 milliGRAM(s) Oral every 8 hours  glucagon  Injectable 1 milliGRAM(s) IntraMuscular once  heparin   Injectable 5000 Unit(s) SubCutaneous every 12 hours  insulin glargine Injectable (LANTUS) 18 Unit(s) SubCutaneous at bedtime  insulin lispro (ADMELOG) corrective regimen sliding scale   SubCutaneous three times a day before meals  insulin lispro (ADMELOG) corrective regimen sliding scale   SubCutaneous at bedtime  insulin lispro Injectable (ADMELOG) 10 Unit(s) SubCutaneous three times a day with meals  methocarbamol 750 milliGRAM(s) Oral every 6 hours  metoprolol succinate ER 25 milliGRAM(s) Oral daily  Nephro-rober 1 Tablet(s) Oral daily  pantoprazole  Injectable 40 milliGRAM(s) IV Push daily  polyethylene glycol 3350 17 Gram(s) Oral daily  sacubitril 24 mG/valsartan 26 mG 1 Tablet(s) Oral two times a day  senna 2 Tablet(s) Oral at bedtime  spironolactone 25 milliGRAM(s) Oral daily    MEDICATIONS  (PRN):  ALPRAZolam 0.25 milliGRAM(s) Oral daily PRN anxiety  dextrose Oral Gel 15 Gram(s) Oral once PRN Blood Glucose LESS THAN 70 milliGRAM(s)/deciliter  melatonin 3 milliGRAM(s) Oral at bedtime PRN Insomnia  ondansetron Injectable 4 milliGRAM(s) IV Push every 6 hours PRN Nausea and/or Vomiting  oxyCODONE    IR 5 milliGRAM(s) Oral every 4 hours PRN Severe Pain (7 - 10)  sodium chloride 0.65% Nasal 1 Spray(s) Both Nostrils three times a day PRN Dryness  sodium chloride 0.9% lock flush 10 milliLiter(s) IV Push every 1 hour PRN Pre/post blood products, medications, blood draw, and to maintain line patency            REVIEW OF SYSTEMS:  Constitutional: [ ] fever, [ ]weight loss,  [x ]fatigue [ ]weight gain  Eyes: [ ] visual changes  Respiratory: [ ]shortness of breath;  [ ] cough, [ ]wheezing, [ ]chills, [ ]hemoptysis  Cardiovascular: [ ] chest pain, [ ]palpitations, [ ]dizziness,  [ ]leg swelling[ ]orthopnea[ ]PND  Gastrointestinal: [ ] abdominal pain, [ ]nausea, [ ]vomiting,  [ ]diarrhea [ ]Constipation [ ]Melena  Genitourinary: [ ] dysuria, [ ] hematuria [ ]Montgomery  Neurologic: [ ] headaches [ ] tremors[ ]weakness [ ]Paralysis Right[ ] Left[ ]  Skin: [ ] itching, [ ]burning, [ ] rashes  Endocrine: [ ] heat or cold intolerance  Musculoskeletal: [ ] joint pain or swelling; [ ] muscle, back, or extremity pain  Psychiatric: [ ] depression, [ ]anxiety, [ ]mood swings, or [ ]difficulty sleeping  Hematologic: [ ] easy bruising, [ ] bleeding gums    [ ] All remaining systems negative except as per above.   [ ]Unable to obtain.  [x] No change in ROS since admission      Vital Signs Last 24 Hrs  T(C): 36.9 (24 Mar 2025 05:47), Max: 37 (24 Mar 2025 00:09)  T(F): 98.5 (24 Mar 2025 05:47), Max: 98.6 (24 Mar 2025 00:09)  HR: 100 (24 Mar 2025 05:47) (97 - 103)  BP: 93/59 (24 Mar 2025 05:47) (91/52 - 95/63)  RR: 18 (24 Mar 2025 05:47) (18 - 18)  SpO2: 95% (24 Mar 2025 05:47) (95% - 98%)    Parameters below as of 24 Mar 2025 05:47  Patient On (Oxygen Delivery Method): room air      I&O's Summary    23 Mar 2025 07:01  -  24 Mar 2025 07:00  --------------------------------------------------------  IN: 1160 mL / OUT: 650 mL / NET: 510 mL        PHYSICAL EXAM:  General: No acute distress BMI-24  HEENT: EOMI, PERRL  Neck: Supple, [ ] JVD  Lungs: Equal air entry bilaterally; [ ] rales [ ] wheezing [ ] rhonchi  Heart: Regular rate and rhythm; [x ] murmur   2/6 [ x] systolic [ ] diastolic [ ] radiation[ ] rubs [ ]  gallops  Abdomen: Nontender, bowel sounds present  Extremities: No clubbing, cyanosis, [x ] edema [ x] Right AKA  Nervous system:  Alert & Oriented X3, no focal deficits  Psychiatric: Normal affect  Skin: No rashes or lesions    LABS:  03-23    131[L]  |  96  |  28[H]  ----------------------------<  164[H]  5.4[H]   |  21[L]  |  1.06    Ca    8.4      23 Mar 2025 07:14      Creatinine Trend: 1.06<--, 0.96<--, 1.03<--, 1.06<--, 0.99<--, 1.09<--                        9.4    13.63 )-----------( 252      ( 23 Mar 2025 07:14 )             27.8          VA Duplex Low Ext Arterial, Ltd, Left (03.21.25 @ 16:22) >  IMPRESSION:    Calcified plaque.  Moderate stenosis of the superficial femoral artery in the mid thigh.  High-grade stenosis of the anterior tibial artery in the upper calf.  Segmental occlusion of the mid and distal posterior tibial artery.  Examination findings were conveyed to nurse practitioner Lucho by  vascular technologist Cameron at 1620 hours on 3/21/2025 with read back.      TTE Limited W or WO Ultrasound Enhancing Agent (03.20.25 @ 17:08) >  CONCLUSIONS:      1. Left ventricular systolic function is severely decreased with an ejection fraction of 31 % by Winchester's method of disks.   2. Multiple segmental abnormalities exist. See findings.   3. Normal right ventricular cavity size and reduced right ventricular systolic function.   4. No pericardial effusion seen.   5. Compared to the transthoracic echocardiogram performed on 3/10/2025, there havebeen no significant interval changes.   6. There is no evidence of a left ventricular thrombus.   7. There is normal LV mass and normal geometry.    12 Lead ECG (03.11.25 @ 04:35) >  SINUS RHYTHM WITHMARKED SINUS ARRHYTHMIA  POSSIBLE ANTERIOR INFARCT , AGE UNDETERMINED  ABNORMAL ECG        Cardiac Catheterization (03.04.25 @ 09:07) >  Conclusions:   Impella placed for HR-PCI (pt was turned down for CABG). EF 25%     Severe proximal LCx stenosis s/p successful rotational atherectomy and balloon angioplasty.   Severe LM and proximal LAD stenosis s/p successful rotational atherectomy, balloon angioplasty, and VERA x2.

## 2025-03-24 NOTE — PROGRESS NOTE ADULT - NSPROGADDITIONALINFOA_GEN_ALL_CORE
Contact via [employer] Teams during business hours  To reach covering provider access AMION via sunrise tools  For Urgent matters/after-hours/weekends/holidays please page endocrine fellow on call   For nonurgent matters please email [email or web address]    Please note that this patient may be followed by different provider tomorrow.  Notify endocrine 24 hours prior to discharge for final recommendations
Contact via Microsoft Teams during business hours  To reach covering provider access AMION via sunrise tools  For Urgent matters/after-hours/weekends/holidays please page endocrine fellow on call   For nonurgent matters please email [email or web address]    Please note that this patient may be followed by different provider tomorrow.  Notify endocrine 24 hours prior to discharge for final recommendations
Contact via Microsoft Teams during business hours  To reach covering provider access AMION via sunrise tools  For Urgent matters/after-hours/weekends/holidays please page endocrine fellow on call   For nonurgent matters please email [email or web address]    Please note that this patient may be followed by different provider tomorrow.  Notify endocrine 24 hours prior to discharge for final recommendations
-Plan discussed with pt/team.  Contact info: [phone] (24/7). pager [phone]  Amion on [place]-[employer]  Teams on M-T-W-F. Unavailable Thu/Weekends/Holidays  Assessed pt/labs/meds and discussed plan of care with primary team  Adjusting insulin  Discharge plan  Follow up care
Contact via Microsoft Teams during business hours  To reach covering provider access AMION via sunrise tools  For Urgent matters/after-hours/weekends/holidays please page endocrine fellow on call   For nonurgent matters please email [email or web address]    Please note that this patient may be followed by different provider tomorrow.  Notify endocrine 24 hours prior to discharge for final recommendations
Contact via Microsoft Teams during business hours  To reach covering provider access AMION via sunrise tools  For Urgent matters/after-hours/weekends/holidays please page endocrine fellow on call   For nonurgent matters please email [email or web address]    Please note that this patient may be followed by different provider tomorrow.  Notify endocrine 24 hours prior to discharge for final recommendations
Contact via Microsoft Teams during business hours  To reach covering provider access AMION via sunrise tools  For Urgent matters/after-hours/weekends/holidays please page endocrine fellow on call   For nonurgent matters please email [email or web address]    Please note that this patient may be followed by different provider tomorrow.  Notify endocrine 24 hours prior to discharge for final recommendations
-Plan discussed with pt/team.  Contact info: [phone] (24/7). pager [phone]  Amion on [place]-Tools  Teams on M-T-W-F. Unavailable Thu/Weekends/Holidays  Assessed pt/labs/meds and discussed plan of care with primary team  Adjusting insulin  Discharge plan  Follow up care
Contact via Microsoft Teams during business hours  To reach covering provider access AMION via sunrise tools  For Urgent matters/after-hours/weekends/holidays please page endocrine fellow on call   For nonurgent matters please email [email or web address]    Please note that this patient may be followed by different provider tomorrow.  Notify endocrine 24 hours prior to discharge for final recommendations
Contact via Microsoft Teams during business hours  To reach covering provider access AMION via sunrise tools  For Urgent matters/after-hours/weekends/holidays please page endocrine fellow on call   For nonurgent matters please email [email or web address]    Please note that this patient may be followed by different provider tomorrow.  Notify endocrine 24 hours prior to discharge for final recommendations
Contact via Microsoft Teams during business hours  To reach covering provider access AMION via sunrise tools  For Urgent matters/after-hours/weekends/holidays please page endocrine fellow on call   For nonurgent matters please email [email or web address]    Please note that this patient may be followed by different provider tomorrow.  Notify endocrine 24 hours prior to discharge for final recommendations
Contact via Microsoft Teams during business hours  To reach covering provider access AMION via sunrise tools  For Urgent matters/after-hours/weekends/holidays please page endocrine fellow on call   For nonurgent matters please email [email or web address]    Please note that this patient may be followed by different provider tomorrow.  Notify endocrine 24 hours prior to discharge for final recommendations

## 2025-03-25 LAB
ANION GAP SERPL CALC-SCNC: 12 MMOL/L — SIGNIFICANT CHANGE UP (ref 5–17)
ANISOCYTOSIS BLD QL: SIGNIFICANT CHANGE UP
BASOPHILS # BLD AUTO: 0.08 K/UL — SIGNIFICANT CHANGE UP (ref 0–0.2)
BASOPHILS NFR BLD AUTO: 0.9 % — SIGNIFICANT CHANGE UP (ref 0–2)
BUN SERPL-MCNC: 32 MG/DL — HIGH (ref 7–23)
CALCIUM SERPL-MCNC: 8.5 MG/DL — SIGNIFICANT CHANGE UP (ref 8.4–10.5)
CHLORIDE SERPL-SCNC: 95 MMOL/L — LOW (ref 96–108)
CO2 SERPL-SCNC: 22 MMOL/L — SIGNIFICANT CHANGE UP (ref 22–31)
CREAT SERPL-MCNC: 1.21 MG/DL — SIGNIFICANT CHANGE UP (ref 0.5–1.3)
EGFR: 50 ML/MIN/1.73M2 — LOW
EGFR: 50 ML/MIN/1.73M2 — LOW
EOSINOPHIL # BLD AUTO: 0.49 K/UL — SIGNIFICANT CHANGE UP (ref 0–0.5)
EOSINOPHIL NFR BLD AUTO: 5.3 % — SIGNIFICANT CHANGE UP (ref 0–6)
GIANT PLATELETS BLD QL SMEAR: PRESENT — SIGNIFICANT CHANGE UP
GLUCOSE BLDC GLUCOMTR-MCNC: 128 MG/DL — HIGH (ref 70–99)
GLUCOSE BLDC GLUCOMTR-MCNC: 154 MG/DL — HIGH (ref 70–99)
GLUCOSE BLDC GLUCOMTR-MCNC: 158 MG/DL — HIGH (ref 70–99)
GLUCOSE BLDC GLUCOMTR-MCNC: 169 MG/DL — HIGH (ref 70–99)
GLUCOSE BLDC GLUCOMTR-MCNC: 175 MG/DL — HIGH (ref 70–99)
GLUCOSE SERPL-MCNC: 159 MG/DL — HIGH (ref 70–99)
HCT VFR BLD CALC: 27.1 % — LOW (ref 34.5–45)
HGB BLD-MCNC: 9.2 G/DL — LOW (ref 11.5–15.5)
LYMPHOCYTES # BLD AUTO: 2.78 K/UL — SIGNIFICANT CHANGE UP (ref 1–3.3)
LYMPHOCYTES # BLD AUTO: 29.8 % — SIGNIFICANT CHANGE UP (ref 13–44)
MACROCYTES BLD QL: SLIGHT — SIGNIFICANT CHANGE UP
MANUAL SMEAR VERIFICATION: SIGNIFICANT CHANGE UP
MCHC RBC-ENTMCNC: 25.6 PG — LOW (ref 27–34)
MCHC RBC-ENTMCNC: 33.9 G/DL — SIGNIFICANT CHANGE UP (ref 32–36)
MCV RBC AUTO: 75.3 FL — LOW (ref 80–100)
MONOCYTES # BLD AUTO: 0.33 K/UL — SIGNIFICANT CHANGE UP (ref 0–0.9)
MONOCYTES NFR BLD AUTO: 3.5 % — SIGNIFICANT CHANGE UP (ref 2–14)
NEUTROPHILS # BLD AUTO: 5.64 K/UL — SIGNIFICANT CHANGE UP (ref 1.8–7.4)
NEUTROPHILS NFR BLD AUTO: 60.5 % — SIGNIFICANT CHANGE UP (ref 43–77)
OSMOLALITY UR: 260 MOS/KG — LOW (ref 300–900)
PLAT MORPH BLD: NORMAL — SIGNIFICANT CHANGE UP
PLATELET # BLD AUTO: 287 K/UL — SIGNIFICANT CHANGE UP (ref 150–400)
POIKILOCYTOSIS BLD QL AUTO: SLIGHT — SIGNIFICANT CHANGE UP
POLYCHROMASIA BLD QL SMEAR: SLIGHT — SIGNIFICANT CHANGE UP
POTASSIUM SERPL-MCNC: 5 MMOL/L — SIGNIFICANT CHANGE UP (ref 3.5–5.3)
POTASSIUM SERPL-SCNC: 5 MMOL/L — SIGNIFICANT CHANGE UP (ref 3.5–5.3)
RBC # BLD: 3.6 M/UL — LOW (ref 3.8–5.2)
RBC # FLD: 21.8 % — HIGH (ref 10.3–14.5)
RBC BLD AUTO: ABNORMAL
SCHISTOCYTES BLD QL AUTO: SLIGHT — SIGNIFICANT CHANGE UP
SODIUM SERPL-SCNC: 129 MMOL/L — LOW (ref 135–145)
SODIUM UR-SCNC: 85 MMOL/L — SIGNIFICANT CHANGE UP
TARGETS BLD QL SMEAR: SLIGHT — SIGNIFICANT CHANGE UP
WBC # BLD: 9.33 K/UL — SIGNIFICANT CHANGE UP (ref 3.8–10.5)
WBC # FLD AUTO: 9.33 K/UL — SIGNIFICANT CHANGE UP (ref 3.8–10.5)

## 2025-03-25 RX ORDER — HYDROMORPHONE/SOD CHLOR,ISO/PF 2 MG/10 ML
0.5 SYRINGE (ML) INJECTION ONCE
Refills: 0 | Status: DISCONTINUED | OUTPATIENT
Start: 2025-03-25 | End: 2025-03-25

## 2025-03-25 RX ORDER — SALINE 7; 19 G/118ML; G/118ML
1 ENEMA RECTAL ONCE
Refills: 0 | Status: COMPLETED | OUTPATIENT
Start: 2025-03-25 | End: 2025-03-25

## 2025-03-25 RX ADMIN — Medication 25 MILLIGRAM(S): at 06:06

## 2025-03-25 RX ADMIN — HEPARIN SODIUM 5000 UNIT(S): 1000 INJECTION INTRAVENOUS; SUBCUTANEOUS at 17:48

## 2025-03-25 RX ADMIN — OXYCODONE HYDROCHLORIDE 5 MILLIGRAM(S): 30 TABLET ORAL at 08:55

## 2025-03-25 RX ADMIN — Medication 975 MILLIGRAM(S): at 05:54

## 2025-03-25 RX ADMIN — Medication 975 MILLIGRAM(S): at 14:08

## 2025-03-25 RX ADMIN — OXYCODONE HYDROCHLORIDE 5 MILLIGRAM(S): 30 TABLET ORAL at 02:59

## 2025-03-25 RX ADMIN — Medication 975 MILLIGRAM(S): at 06:37

## 2025-03-25 RX ADMIN — INSULIN LISPRO 9 UNIT(S): 100 INJECTION, SOLUTION INTRAVENOUS; SUBCUTANEOUS at 08:36

## 2025-03-25 RX ADMIN — INSULIN LISPRO 9 UNIT(S): 100 INJECTION, SOLUTION INTRAVENOUS; SUBCUTANEOUS at 17:46

## 2025-03-25 RX ADMIN — GABAPENTIN 300 MILLIGRAM(S): 400 CAPSULE ORAL at 14:08

## 2025-03-25 RX ADMIN — Medication 40 MILLIGRAM(S): at 12:23

## 2025-03-25 RX ADMIN — CLOPIDOGREL BISULFATE 75 MILLIGRAM(S): 75 TABLET, FILM COATED ORAL at 12:24

## 2025-03-25 RX ADMIN — GABAPENTIN 300 MILLIGRAM(S): 400 CAPSULE ORAL at 05:55

## 2025-03-25 RX ADMIN — SACUBITRIL AND VALSARTAN 1 TABLET(S): 6; 6 PELLET ORAL at 05:56

## 2025-03-25 RX ADMIN — Medication 81 MILLIGRAM(S): at 12:25

## 2025-03-25 RX ADMIN — ATORVASTATIN CALCIUM 40 MILLIGRAM(S): 80 TABLET, FILM COATED ORAL at 22:46

## 2025-03-25 RX ADMIN — SALINE 1 ENEMA: 7; 19 ENEMA RECTAL at 19:34

## 2025-03-25 RX ADMIN — Medication 975 MILLIGRAM(S): at 15:00

## 2025-03-25 RX ADMIN — Medication 20 MILLIGRAM(S): at 05:56

## 2025-03-25 RX ADMIN — Medication 5 MILLIGRAM(S): at 05:54

## 2025-03-25 RX ADMIN — METHOCARBAMOL 750 MILLIGRAM(S): 500 TABLET, FILM COATED ORAL at 17:49

## 2025-03-25 RX ADMIN — Medication 1 TABLET(S): at 12:24

## 2025-03-25 RX ADMIN — INSULIN LISPRO 1: 100 INJECTION, SOLUTION INTRAVENOUS; SUBCUTANEOUS at 08:36

## 2025-03-25 RX ADMIN — INSULIN LISPRO 1: 100 INJECTION, SOLUTION INTRAVENOUS; SUBCUTANEOUS at 17:46

## 2025-03-25 RX ADMIN — Medication 975 MILLIGRAM(S): at 22:16

## 2025-03-25 RX ADMIN — Medication 2 TABLET(S): at 22:15

## 2025-03-25 RX ADMIN — Medication 5 MILLIGRAM(S): at 17:48

## 2025-03-25 RX ADMIN — Medication 0.5 MILLIGRAM(S): at 22:46

## 2025-03-25 RX ADMIN — OXYCODONE HYDROCHLORIDE 5 MILLIGRAM(S): 30 TABLET ORAL at 01:18

## 2025-03-25 RX ADMIN — POLYETHYLENE GLYCOL 3350 17 GRAM(S): 17 POWDER, FOR SOLUTION ORAL at 12:23

## 2025-03-25 RX ADMIN — GABAPENTIN 300 MILLIGRAM(S): 400 CAPSULE ORAL at 22:16

## 2025-03-25 RX ADMIN — OXYCODONE HYDROCHLORIDE 5 MILLIGRAM(S): 30 TABLET ORAL at 14:08

## 2025-03-25 RX ADMIN — INSULIN LISPRO 9 UNIT(S): 100 INJECTION, SOLUTION INTRAVENOUS; SUBCUTANEOUS at 12:21

## 2025-03-25 RX ADMIN — SACUBITRIL AND VALSARTAN 1 TABLET(S): 6; 6 PELLET ORAL at 17:48

## 2025-03-25 RX ADMIN — METHOCARBAMOL 750 MILLIGRAM(S): 500 TABLET, FILM COATED ORAL at 05:53

## 2025-03-25 RX ADMIN — Medication 1 APPLICATION(S): at 06:06

## 2025-03-25 RX ADMIN — INSULIN GLARGINE-YFGN 17 UNIT(S): 100 INJECTION, SOLUTION SUBCUTANEOUS at 22:16

## 2025-03-25 RX ADMIN — OXYCODONE HYDROCHLORIDE 5 MILLIGRAM(S): 30 TABLET ORAL at 14:30

## 2025-03-25 RX ADMIN — OXYCODONE HYDROCHLORIDE 5 MILLIGRAM(S): 30 TABLET ORAL at 09:30

## 2025-03-25 RX ADMIN — METHOCARBAMOL 750 MILLIGRAM(S): 500 TABLET, FILM COATED ORAL at 12:23

## 2025-03-25 RX ADMIN — Medication 0.5 MILLIGRAM(S): at 18:20

## 2025-03-25 RX ADMIN — Medication 0.5 MILLIGRAM(S): at 17:47

## 2025-03-25 RX ADMIN — Medication 0.5 MILLIGRAM(S): at 22:19

## 2025-03-25 RX ADMIN — CEFTRIAXONE 100 MILLIGRAM(S): 500 INJECTION, POWDER, FOR SOLUTION INTRAMUSCULAR; INTRAVENOUS at 06:02

## 2025-03-25 RX ADMIN — HEPARIN SODIUM 5000 UNIT(S): 1000 INJECTION INTRAVENOUS; SUBCUTANEOUS at 05:56

## 2025-03-25 NOTE — PROGRESS NOTE ADULT - ASSESSMENT
64y Female with history of CHF presents as a transfer for LE CO2 angiogram. Nephrology consulted for elevated Scr.    1) JAMEL: likely due to ATN for which patient initiated on RRT with last HD on 3/14. JAMEL resolved. No need for additional HD at this time. S/P TDC removal on 3/24. S/P stiles for urinary retention. Avoid nephrotoxins.    2) HTN: BP low normal. Continue with current medications.    3) LE edema: With excellent UO. Continue with torsemide 20 mg daily and aldactone 25 mg daily. Will add farxiga prior to discharge. TTE with severely decreased LVSF. Monitor UO.     4) Anemia: Hb acceptable with low TSAT. No IV iron given elevated ferritin. S/P Epo 8K X 1 dose 3/12. Monitor Hb.    5) Hyponatremia: Mild and in the setting of volume overload. Diuretics as above. Continue with 1L FR. Will add Urea/tolvaptan if serum Na decreases further.        [facility] NEPHROLOGY  Raji Waterman M.D.  Mars Feliz D.O.  Kelley Parks M.D.  MD Nancy Kulkarni, MSN, ANP-C    Telephone: [phone]  Facsimile: [phone] [place] [address], [address]  [place] [address]

## 2025-03-25 NOTE — PROGRESS NOTE ADULT - ASSESSMENT
64F, hx of CHF (last TTE on 1/16/25 EF 30-35%, G2DD), DM, diabetic foot ulcer with a recent admission at [facility] for CHF exacerbation 1/14-1/17 p/w worsening R. leg pain and fluid secretion, was meeting sepsis criteria with podiatry having low suspicion for right cellulitis and admitted for continued management of HF exacerbation and needing ischemic eval.     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  -Left leg Arterial Duplex: Slightly tardus parvus waveform of the left popliteal artery is noted, for which underlying disease cannot be excluded. Posterior tibial artery waveform is nonpulsatile. Anterior tibial artery tardus parvus flow is noted.   Transferred to [facility] for CO2 angiogram as per vascular surgery    #  PAD Wound of lower extremity.   ·  Plan: Podiatry following, b/l serous bullae, no concerns for infection  Found to have RLE coolness  -Right Leg Arterial Duplex: Popliteal artery is occluded with negligible flow of right trifurcation arteries.  -Left leg Arterial Duplex: Slightly tardus parvus waveform of the left popliteal artery is noted, for which underlying disease cannot be excluded. Posterior tibial artery waveform is nonpulsatile. Anterior tibial artery tardus parvus flow is noted.  -Started on heparin gtt and dobutamine gtt -Will transfer to [facility] for CO2 angiogram as per vascular surgery as not candidate for CTA due to worsening SCr  -Keep compression dressing  -LE elevation above heart level at rest.  -Transferred to [facility] for CO2 angiogram   - Overall this patient is at   intermediate  risk (for cardiac death, nonfatal myocardial infarction, and nonfatal cardiac arrest perioperatively for this intermediate  risk procedure).   No cardiac contraindications for CO2 vangiogram  There  are  no further recommendation for risk stratifying imaging/stress testing prior to planned surgery  Seen by Podiatry-No acute pod intervention, local wound care only-   PAD with rest ischemia  s/p AT/Popliteal angioplasty on DAPT  - RLE extensive gangrnous changes to foot dorsum, ankle, heel dorsomedial and lateral lower leg, ischemic changes to 2nd digit and hallux,  Left foot hallux distal tuft well adhered eschar, no acute signs of infection.   -Seen by vascular Plan for AKA   -s/p Right AKA  -LLE Arterial Duplex-Moderate stenosis of the superficial femoral artery in the mid thigh.  High-grade stenosis of the anterior tibial artery in the upper calf.  Segmental occlusion of the mid and distal posterior tibial artery.  -Vascular follow up      # HFrEF (congestive heart failure). Ischemic Cardiomyopathy  ·  Plan: Hx of HF, previous admission in January, on home Lasix 40 qD, Metoprolol Succ 25 qD. Not on Entresto due to insurance issues  Last TTE: LVSF moderately decreased w/ EF 30-35 %. Moderate G2DD. Mild MR  Stress test: small-sized, moderate defect(s) in the apical wall that is predominantly fixed suggestive of an infarction with minimal marcus-infarct ischemia  Ischemic cardiomyopathy  GDMT on hold 2/2 JAMEL  Repeat TTE EF 20% with WMA RAP~~8  Repeat Limited TTE  Taper off Milrinone and start GDMT  Is currently off Hydral/Isdn and Bumex 2/2 soft BP  Continue HD with UF had 2900mL removed yesterday will stop Milrinone and observe  Started  low dose BBlocker Metoprolol tartate 12.5 mg PO q8  03/10-Off Midodrine will reattempt Hyd/Isdn  03/11-Daily HD/UF   03/12-Awake s/p Tunelled HD catheter-Plan for Discharge to [facility]  03/13-EP evaluation Arias episodes  03/14-Increasing necrosis noted Right LE Vascular planning for Right AKA   03/15-Right AKAvs BKA on Monday 03/16- Awake Sinus Rhythm clinically stable for OR tomorrow    03/17-Somnolent not dyspneac for OR today  03/19-POD#2  R AKA No dyspnea BP stable   03/20-POD#3 Stable start GDMT for HF Limited TTE    03/21-Awake afebrile TTE EF 31% no dyspnea Creat 1.03  -HF GDMT-Metoprolol succ 25mg                   - Furosemide 40mg                   - Spironolactone 25 md                   - Lisinopril 2.5 mg  03/23-Started on Entresto        # CAD  [facility]-RCA , severe ostial LM disease, diffuse disease in LAD and LCx, some L to R collaterals-seen by CTS advise cMR for viability poor target vessels for CABG-?High risk LM/LAD PCI  Had cMR-LVEF is 25%, greater than 75% subendocardial scarring involving the basal to mid inferior wall of the left ventricle, left ventricular transmural scarring involving the apical septal wall and likely near transmural scarring of the apical lateral wall. 50% scarring of the left ventricular basal inferoseptal wall.  03/05-s/p PCI-LM/LAD   Continue  uninterrupted DAPT        # JAMEL on CKD   Creatinine Trend:1.21 <--1.06<-- 0.96<--1.03 <--0.99<--, 1.09<--1.47 <--1.80<--2.99 <--3.61 <---3.38<--(HD)-- 4.76<--4.07<---3.90<-- 1.17  Has had 3 sessions HD for interrmittent HD to allow contrast based procedures is non oliguric  Plan to continue HD likely will need extended RRT  Shiley change on 03/11  Permacath  Been off dialysis since 03/17  Renal function improved  Permacath removal  03/24

## 2025-03-25 NOTE — PROGRESS NOTE ADULT - SUBJECTIVE AND OBJECTIVE BOX
Follow-up Pulm Progress Note    No new respiratory events overnight.  Denies SOB/CP.     Medications:  MEDICATIONS  (STANDING):  acetaminophen     Tablet .. 975 milliGRAM(s) Oral every 8 hours  aspirin enteric coated 81 milliGRAM(s) Oral daily  atorvastatin 40 milliGRAM(s) Oral at bedtime  bisacodyl 5 milliGRAM(s) Oral every 12 hours  cefTRIAXone   IVPB 1000 milliGRAM(s) IV Intermittent every 24 hours  chlorhexidine 4% Liquid 1 Application(s) Topical <User Schedule>  clopidogrel Tablet 75 milliGRAM(s) Oral daily  dextrose 5%. 1000 milliLiter(s) (100 mL/Hr) IV Continuous <Continuous>  dextrose 5%. 1000 milliLiter(s) (50 mL/Hr) IV Continuous <Continuous>  dextrose 50% Injectable 25 Gram(s) IV Push once  dextrose 50% Injectable 12.5 Gram(s) IV Push once  dextrose 50% Injectable 25 Gram(s) IV Push once  gabapentin 300 milliGRAM(s) Oral every 8 hours  glucagon  Injectable 1 milliGRAM(s) IntraMuscular once  heparin   Injectable 5000 Unit(s) SubCutaneous every 12 hours  insulin glargine Injectable (LANTUS) 17 Unit(s) SubCutaneous at bedtime  insulin lispro (ADMELOG) corrective regimen sliding scale   SubCutaneous three times a day before meals  insulin lispro (ADMELOG) corrective regimen sliding scale   SubCutaneous at bedtime  insulin lispro Injectable (ADMELOG) 9 Unit(s) SubCutaneous three times a day with meals  methocarbamol 750 milliGRAM(s) Oral every 6 hours  metoprolol succinate ER 25 milliGRAM(s) Oral daily  Nephro-rober 1 Tablet(s) Oral daily  pantoprazole  Injectable 40 milliGRAM(s) IV Push daily  polyethylene glycol 3350 17 Gram(s) Oral daily  sacubitril 24 mG/valsartan 26 mG 1 Tablet(s) Oral two times a day  senna 2 Tablet(s) Oral at bedtime  spironolactone 25 milliGRAM(s) Oral daily  torsemide 20 milliGRAM(s) Oral daily    MEDICATIONS  (PRN):  dextrose Oral Gel 15 Gram(s) Oral once PRN Blood Glucose LESS THAN 70 milliGRAM(s)/deciliter  melatonin 3 milliGRAM(s) Oral at bedtime PRN Insomnia  ondansetron Injectable 4 milliGRAM(s) IV Push every 6 hours PRN Nausea and/or Vomiting  oxyCODONE    IR 5 milliGRAM(s) Oral every 4 hours PRN Severe Pain (7 - 10)  sodium chloride 0.65% Nasal 1 Spray(s) Both Nostrils three times a day PRN Dryness  sodium chloride 0.9% lock flush 10 milliLiter(s) IV Push every 1 hour PRN Pre/post blood products, medications, blood draw, and to maintain line patency          Vital Signs Last 24 Hrs  T(C): 36.8 (25 Mar 2025 11:36), Max: 36.8 (24 Mar 2025 21:34)  T(F): 98.2 (25 Mar 2025 11:36), Max: 98.3 (24 Mar 2025 21:34)  HR: 98 (25 Mar 2025 11:36) (84 - 104)  BP: 90/54 (25 Mar 2025 11:36) (90/54 - 105/64)  BP(mean): --  RR: 18 (25 Mar 2025 11:36) (18 - 18)  SpO2: 93% (25 Mar 2025 11:36) (92% - 98%)    Parameters below as of 25 Mar 2025 11:36  Patient On (Oxygen Delivery Method): room air              03-24 @ 07:01  -  03-25 @ 07:00  --------------------------------------------------------  IN: 550 mL / OUT: 2500 mL / NET: -1950 mL          LABS:                        9.2    9.33  )-----------( 287      ( 25 Mar 2025 05:32 )             27.1     03-25    129[L]  |  95[L]  |  32[H]  ----------------------------<  159[H]  5.0   |  22  |  1.21    Ca    8.5      25 Mar 2025 05:31            CAPILLARY BLOOD GLUCOSE      POCT Blood Glucose.: 128 mg/dL (25 Mar 2025 11:39)      Urinalysis Basic - ( 25 Mar 2025 05:31 )    Color: x / Appearance: x / SG: x / pH: x  Gluc: 159 mg/dL / Ketone: x  / Bili: x / Urobili: x   Blood: x / Protein: x / Nitrite: x   Leuk Esterase: x / RBC: x / WBC x   Sq Epi: x / Non Sq Epi: x / Bacteria: x      CULTURES: (if applicable)      Culture - Urine (collected 03-24-25 @ 07:13)  Source: Catheterized Catheterized  Preliminary Report (03-25-25 @ 08:12):    >100,000 CFU/ml Gram Negative Rods      Physical Examination:  PULM: grossly clear  CVS: S1, S2 heard    RADIOLOGY REVIEWED  CXR: mild congestion

## 2025-03-25 NOTE — PROGRESS NOTE ADULT - SUBJECTIVE AND OBJECTIVE BOX
MR#[id number]  PATIENT NAME:COLE ALBA    DATE OF SERVICE: 03-25-25 @ 07:10  Patient was seen and examined by Yordy Gilmore MD on    03-25-25 @ 07:10 .  Interim events noted.Consultant notes ,Labs,Telemetry reviewed by me       HOSPITAL COURSE: HPI:  64F, hx of CHF (last TTE on 1/16/25 EF 30-35%, G2DD), DM, diabetic foot ulcer with a recent admission at [facility] for CHF exacerbation presented as a transfer for worsening R. leg pain and fluid secretion, On non-invasive imaging demonstrating severe depletion of RLE blood flow beyond the popliteal vessel at knee and similar findings in L. Was transferred to [facility] for CO2 angiogram with Dr. Baltazar. (29 Jan 2025 20:05)      INTERIM EVENTS:Patient seen at bedside ,interim events noted.  03/05-Awake s/p LHC VERA x 1 to LAD, VERA to LM , POBA to CX via RFA  03/18-POD#1 Right AKA  03/22-Awake alert no dyspnea - LLE arterial US results:   Moderate stenosis of the superficial femoral artery in the mid thigh.  High-grade stenosis of the anterior tibial artery in the upper calf.  Segmental occlusion of the mid and distal posterior tibial artery.  03/23-Awake calert pain controlled Sinus Rhythm Good UOP now has Montgomery  03/24-Awake not orthopneac good UOP Awaiting Vascular follow up-Left LE-for Permacath removed  03/25-AWake alert Sinus Rhythm awaiting Vascular       PMH -reviewed admission note, no change since admission    MEDICATIONS  (STANDING):  acetaminophen     Tablet .. 975 milliGRAM(s) Oral every 8 hours  aspirin enteric coated 81 milliGRAM(s) Oral daily  atorvastatin 40 milliGRAM(s) Oral at bedtime  bisacodyl 5 milliGRAM(s) Oral every 12 hours  cefTRIAXone   IVPB 1000 milliGRAM(s) IV Intermittent every 24 hours  chlorhexidine 4% Liquid 1 Application(s) Topical <User Schedule>  clopidogrel Tablet 75 milliGRAM(s) Oral daily  gabapentin 300 milliGRAM(s) Oral every 8 hours  glucagon  Injectable 1 milliGRAM(s) IntraMuscular once  heparin   Injectable 5000 Unit(s) SubCutaneous every 12 hours  insulin glargine Injectable (LANTUS) 17 Unit(s) SubCutaneous at bedtime  insulin lispro (ADMELOG) corrective regimen sliding scale   SubCutaneous three times a day before meals  insulin lispro (ADMELOG) corrective regimen sliding scale   SubCutaneous at bedtime  insulin lispro Injectable (ADMELOG) 9 Unit(s) SubCutaneous three times a day with meals  methocarbamol 750 milliGRAM(s) Oral every 6 hours  metoprolol succinate ER 25 milliGRAM(s) Oral daily  Nephro-rober 1 Tablet(s) Oral daily  pantoprazole  Injectable 40 milliGRAM(s) IV Push daily  polyethylene glycol 3350 17 Gram(s) Oral daily  sacubitril 24 mG/valsartan 26 mG 1 Tablet(s) Oral two times a day  senna 2 Tablet(s) Oral at bedtime  spironolactone 25 milliGRAM(s) Oral daily  torsemide 20 milliGRAM(s) Oral daily    MEDICATIONS  (PRN):  dextrose Oral Gel 15 Gram(s) Oral once PRN Blood Glucose LESS THAN 70 milliGRAM(s)/deciliter  melatonin 3 milliGRAM(s) Oral at bedtime PRN Insomnia  ondansetron Injectable 4 milliGRAM(s) IV Push every 6 hours PRN Nausea and/or Vomiting  oxyCODONE    IR 5 milliGRAM(s) Oral every 4 hours PRN Severe Pain (7 - 10)  sodium chloride 0.65% Nasal 1 Spray(s) Both Nostrils three times a day PRN Dryness  sodium chloride 0.9% lock flush 10 milliLiter(s) IV Push every 1 hour PRN Pre/post blood products, medications, blood draw, and to maintain line patency            REVIEW OF SYSTEMS:  Constitutional: [ ] fever, [ ]weight loss,  [x ]fatigue [ ]weight gain  Eyes: [ ] visual changes  Respiratory: [ ]shortness of breath;  [ ] cough, [ ]wheezing, [ ]chills, [ ]hemoptysis  Cardiovascular: [ ] chest pain, [ ]palpitations, [ ]dizziness,  [ ]leg swelling[ ]orthopnea[ ]PND  Gastrointestinal: [ ] abdominal pain, [ ]nausea, [ ]vomiting,  [ ]diarrhea [ ]Constipation [ ]Melena  Genitourinary: [ ] dysuria, [ ] hematuria [ ]Montgomery  Neurologic: [ ] headaches [ ] tremors[ ]weakness [ ]Paralysis Right[ ] Left[ ]  Skin: [ ] itching, [ ]burning, [ ] rashes  Endocrine: [ ] heat or cold intolerance  Musculoskeletal: [ ] joint pain or swelling; [ ] muscle, back, or extremity pain  Psychiatric: [ ] depression, [ ]anxiety, [ ]mood swings, or [ ]difficulty sleeping  Hematologic: [ ] easy bruising, [ ] bleeding gums    [ ] All remaining systems negative except as per above.   [ ]Unable to obtain.  [x] No change in ROS since admission      Vital Signs Last 24 Hrs  T(C): 36.8 (25 Mar 2025 05:31), Max: 37 (24 Mar 2025 07:50)  T(F): 98.3 (25 Mar 2025 05:31), Max: 98.6 (24 Mar 2025 07:50)  HR: 100 (25 Mar 2025 05:31) (84 - 104)  BP: 105/64 (25 Mar 2025 05:31) (90/59 - 117/65)  RR: 18 (25 Mar 2025 05:31) (18 - 18)  SpO2: 92% (25 Mar 2025 05:31) (92% - 99%)    Parameters below as of 25 Mar 2025 05:31  Patient On (Oxygen Delivery Method): room air      I&O's Summary    24 Mar 2025 07:01  -  25 Mar 2025 07:00  --------------------------------------------------------  IN: 550 mL / OUT: 2500 mL / NET: -1950 mL        PHYSICAL EXAM:  General: No acute distress BMI-24  HEENT: EOMI, PERRL  Neck: Supple, [ ] JVD  Lungs: Equal air entry bilaterally; [ ] rales [ ] wheezing [ ] rhonchi  Heart: Regular rate and rhythm; [x ] murmur   2/6 [ x] systolic [ ] diastolic [ ] radiation[ ] rubs [ ]  gallops  Abdomen: Nontender, bowel sounds present  Extremities: No clubbing, cyanosis, [ ] edema [x ]Right AKA  Nervous system:  Alert & Oriented X3, no focal deficits  Psychiatric: Normal affect  Skin: No rashes or lesions    LABS:  03-25    129[L]  |  95[L]  |  32[H]  ----------------------------<  159[H]  5.0   |  22  |  1.21    Ca    8.5      25 Mar 2025 05:31      Creatinine Trend: 1.21<--, 1.07<--, 1.06<--, 0.96<--, 1.03<--, 1.06<--                        9.2    9.33  )-----------( 287      ( 25 Mar 2025 05:32 )             27.1         VA Duplex Low Ext Arterial, Ltd, Left (03.21.25 @ 16:22) >  IMPRESSION:    Calcified plaque.  Moderate stenosis of the superficial femoral artery in the mid thigh.  High-grade stenosis of the anterior tibial artery in the upper calf.  Segmental occlusion of the mid and distal posterior tibial artery.  Examination findings were conveyed to nurse practitioner Lucho by  vascular technologist Cameron at 1620 hours on 3/21/2025 with read back.      TTE Limited W or WO Ultrasound Enhancing Agent (03.20.25 @ 17:08) >  CONCLUSIONS:      1. Left ventricular systolic function is severely decreased with an ejection fraction of 31 % by Winchester's method of disks.   2. Multiple segmental abnormalities exist. See findings.   3. Normal right ventricular cavity size and reduced right ventricular systolic function.   4. No pericardial effusion seen.   5. Compared to the transthoracic echocardiogram performed on 3/10/2025, there havebeen no significant interval changes.   6. There is no evidence of a left ventricular thrombus.   7. There is normal LV mass and normal geometry.    12 Lead ECG (03.11.25 @ 04:35) >  SINUS RHYTHM WITHMARKED SINUS ARRHYTHMIA  POSSIBLE ANTERIOR INFARCT , AGE UNDETERMINED  ABNORMAL ECG        Cardiac Catheterization (03.04.25 @ 09:07) >  Conclusions:   Impella placed for HR-PCI (pt was turned down for CABG). EF 25%     Severe proximal LCx stenosis s/p successful rotational atherectomy and balloon angioplasty.   Severe LM and proximal LAD stenosis s/p successful rotational atherectomy, balloon angioplasty, and VERA x2.

## 2025-03-25 NOTE — PROGRESS NOTE ADULT - SUBJECTIVE AND OBJECTIVE BOX
INTERNAL MEDICINE PROGRESS NOTE     NAME OF PATIENT: TOMASZ ALBA  MRN: [id number]  DATE OF VISIT: 03-25-25 @ 13:49    SUBJECTIVE/ ROS:  - Patient seen and examined by bedside     OBJECTIVE:  ICU Vital Signs Last 24 Hrs  T(C): 36.8 (25 Mar 2025 11:36), Max: 36.8 (24 Mar 2025 21:34)  T(F): 98.2 (25 Mar 2025 11:36), Max: 98.3 (24 Mar 2025 21:34)  HR: 98 (25 Mar 2025 11:36) (84 - 104)  BP: 90/54 (25 Mar 2025 11:36) (90/54 - 105/64)  BP(mean): --  ABP: --  ABP(mean): --  RR: 18 (25 Mar 2025 11:36) (18 - 18)  SpO2: 93% (25 Mar 2025 11:36) (92% - 98%)    O2 Parameters below as of 25 Mar 2025 11:36  Patient On (Oxygen Delivery Method): room air          03-25-25 @ 13:49  T(C): 36.8 (03-25-25 @ 11:36), Max: 36.8 (03-24-25 @ 21:34)  HR: 98 (03-25-25 @ 11:36) (84 - 104)  BP: 90/54 (03-25-25 @ 11:36) (90/54 - 105/64)  RR: 18 (03-25-25 @ 11:36) (18 - 18)  SpO2: 93% (03-25-25 @ 11:36) (92% - 98%)  Wt(kg): --  CAPILLARY BLOOD GLUCOSE      POCT Blood Glucose.: 128 mg/dL (25 Mar 2025 11:39)      HOSPITAL MEDICATIONS:  MEDICATIONS  (STANDING):  acetaminophen     Tablet .. 975 milliGRAM(s) Oral every 8 hours  aspirin enteric coated 81 milliGRAM(s) Oral daily  atorvastatin 40 milliGRAM(s) Oral at bedtime  bisacodyl 5 milliGRAM(s) Oral every 12 hours  cefTRIAXone   IVPB 1000 milliGRAM(s) IV Intermittent every 24 hours  chlorhexidine 4% Liquid 1 Application(s) Topical <User Schedule>  clopidogrel Tablet 75 milliGRAM(s) Oral daily  dextrose 5%. 1000 milliLiter(s) (100 mL/Hr) IV Continuous <Continuous>  dextrose 5%. 1000 milliLiter(s) (50 mL/Hr) IV Continuous <Continuous>  dextrose 50% Injectable 25 Gram(s) IV Push once  dextrose 50% Injectable 12.5 Gram(s) IV Push once  dextrose 50% Injectable 25 Gram(s) IV Push once  gabapentin 300 milliGRAM(s) Oral every 8 hours  glucagon  Injectable 1 milliGRAM(s) IntraMuscular once  heparin   Injectable 5000 Unit(s) SubCutaneous every 12 hours  insulin glargine Injectable (LANTUS) 17 Unit(s) SubCutaneous at bedtime  insulin lispro (ADMELOG) corrective regimen sliding scale   SubCutaneous three times a day before meals  insulin lispro (ADMELOG) corrective regimen sliding scale   SubCutaneous at bedtime  insulin lispro Injectable (ADMELOG) 9 Unit(s) SubCutaneous three times a day with meals  methocarbamol 750 milliGRAM(s) Oral every 6 hours  metoprolol succinate ER 25 milliGRAM(s) Oral daily  Nephro-rober 1 Tablet(s) Oral daily  pantoprazole  Injectable 40 milliGRAM(s) IV Push daily  polyethylene glycol 3350 17 Gram(s) Oral daily  sacubitril 24 mG/valsartan 26 mG 1 Tablet(s) Oral two times a day  senna 2 Tablet(s) Oral at bedtime  spironolactone 25 milliGRAM(s) Oral daily  torsemide 20 milliGRAM(s) Oral daily    MEDICATIONS  (PRN):  dextrose Oral Gel 15 Gram(s) Oral once PRN Blood Glucose LESS THAN 70 milliGRAM(s)/deciliter  melatonin 3 milliGRAM(s) Oral at bedtime PRN Insomnia  ondansetron Injectable 4 milliGRAM(s) IV Push every 6 hours PRN Nausea and/or Vomiting  oxyCODONE    IR 5 milliGRAM(s) Oral every 4 hours PRN Severe Pain (7 - 10)  sodium chloride 0.65% Nasal 1 Spray(s) Both Nostrils three times a day PRN Dryness  sodium chloride 0.9% lock flush 10 milliLiter(s) IV Push every 1 hour PRN Pre/post blood products, medications, blood draw, and to maintain line patency      PHYSICAL EXAMINATION:  General: NAD   Cardiology: S1/S2 with no murmur   Respiratory: CTA BL with no wheeze   GI: Soft and NTND  Extremities: JOSE L of BL UE/LE   Neurology: Awake with no acute neurological deficits     LABS:                        9.2    9.33  )-----------( 287      ( 25 Mar 2025 05:32 )             27.1     03-25    129[L]  |  95[L]  |  32[H]  ----------------------------<  159[H]  5.0   |  22  |  1.21    Ca    8.5      25 Mar 2025 05:31            MICROBIOLOGY:     RADIOLOGY:    CARDIOLOGY:

## 2025-03-25 NOTE — PROGRESS NOTE ADULT - SUBJECTIVE AND OBJECTIVE BOX
[facility] NEPHROLOGY- PROGRESS NOTE    64y Female with history of CHF presents as a transfer for LE CO2 angiogram. Nephrology consulted for elevated Scr.    Patient s/p R AKA on 3/17.     REVIEW OF SYSTEMS:  Gen: no fevers  Cards: no chest pain  Resp: no dyspnea  GI: no nausea or vomiting or diarrhea  Vascular: + LLE edema    Augmentin (Stomach Upset; Vomiting; Nausea)  No Known Allergies      Hospital Medications: Medications reviewed        VITALS:  T(F): 98.3 (03-25-25 @ 05:31), Max: 98.3 (03-24-25 @ 21:34)  HR: 100 (03-25-25 @ 05:31)  BP: 105/64 (03-25-25 @ 05:31)  RR: 18 (03-25-25 @ 05:31)  SpO2: 92% (03-25-25 @ 05:31)  Wt(kg): --    03-24 @ 07:01  -  03-25 @ 07:00  --------------------------------------------------------  IN: 550 mL / OUT: 2500 mL / NET: -1950 mL    03-25 @ 07:01  -  03-25 @ 10:32  --------------------------------------------------------  IN: 250 mL / OUT: 400 mL / NET: -150 mL        PHYSICAL EXAM:  Gen: NAD, calm  Cards: RRR, +S1/S2, no M/G/R  Resp: bibasilar rales  GI: soft, NT/ND, NABS  : + stiles  Vascular: R AKA, + LLE edema, + RIJ TDC removed with site unremarkable         LABS:  03-25    129[L]  |  95[L]  |  32[H]  ----------------------------<  159[H]  5.0   |  22  |  1.21    Ca    8.5      25 Mar 2025 05:31      Creatinine Trend: 1.21 <--, 1.07 <--, 1.06 <--, 0.96 <--, 1.03 <--, 1.06 <--, 0.99 <--                        9.2    9.33  )-----------( 287      ( 25 Mar 2025 05:32 )             27.1     Urine Studies:  Urinalysis Basic - ( 25 Mar 2025 05:31 )    Color:  / Appearance:  / SG:  / pH:   Gluc: 159 mg/dL / Ketone:   / Bili:  / Urobili:    Blood:  / Protein:  / Nitrite:    Leuk Esterase:  / RBC:  / WBC    Sq Epi:  / Non Sq Epi:  / Bacteria:

## 2025-03-25 NOTE — PROGRESS NOTE ADULT - ASSESSMENT
63 YO F with PMHx of HFrEF 30-35 and grade 2 ddfxn (last TTE on 01/16/25), DM2, and diabetic foot ulcer. Recent admission at [facility] for ADHF. Patient now represents to OSH and ultimately to [facility] as a transfer for worsening right leg pain and fluid secretion. As per documentation, patient was reported to have severe depletion of RLE blood flow beyond the popliteal vessel at knee and similar findings in the left. Patient was transferred to [facility] for CO2 angiogram with Dr. Baltazar. Of note, patient also with reported visual disturbance for which patient was seen and evaluated by opthalmology. Internal Medicine has been consulted on Ms. Kirby's care for medical management.       # Foot ulcers with worsening RLE pain second to pop artery occlusion +/- diabetic ulcers   - RLE Arterial Duplex with popliteal artery occlusion   - LLE Arterial Duplex with underlying disease cannot be excluded   - s/p angio with pop and AT VERA on 2/24   - c/b necrosis of RLE   - S/P R AKA 3/17   - Continue on Lipitor and DAPT  - Continue pain control with oxy  - Wound care called for dressing recs   - Pain management   - Vascular and Podiatry evals appreciated; F/u recs     # LLE PAD   - DOPPLER 3/21 with Calcified plaque, Moderate stenosis of superficial femoral artery, High-grade stenosis of anterior tibial artery in upper calf, Segmental occlusion of mid and distal posterior tibial artery   - Continue on lipitor and DAPT as above   - Vascular reevals pending    # ADHF/ BiV HFrEF 20   - Recent admission at [facility] for ADHF and on dobutamine outpatient  - TTE 1/16 with EF 30-35 with moderately reduced LVSF and grade 2 ddfxn, normal RVSF , trace TR/ OH, and trace pericardial effusion  - RPT TTE with EF 20, severely decreased LV, decreased RVSF with TAPSE 1.2, and regional wall motion abnormalities present with entire septum, entire apex, entire inferior wall, mid inferolateral segment, and mid anterolateral segment are hypokinetic.   - RHC with RA 20, PA 49/29 (40), PCWP 35, mVO2 51.1, CO/CI 3.8/2.2, SVR 1095 w/ Multivessel CAD   - s/p zaroxyln 10 QD to augment diuresis   - s/p bumex GTT   - s/p HTS to augment diuresis   - RPT limited TTE w/ LVSF severely decreased, reduced RVSF, LVOT VTI 12, mild to mod TR, and mildly elevated right atrial pressure  - s/p bumex 4 IV QD, but became anuric and dc'ed   - RPT ECHO post cath with EF 28 %, regional WMA, multiple segmental abnormalities exist, and reduced RVSF    - Case discussed with HF and cards and monitoring off milrinone GTT  - JAMEL as below resolved and urine output improved   - RPT ECHO with EF 31 with severely decreased LVSD and reduced RVSF   - Continue on torsemide 20 and aldactone per Cards  - Continue on entresto and toprol 25 for GDMT per Cards  - Case discussed with EP and needs to be on GDMT for 3 months before AICD placement and should be stabilized off of inotropic support.    - Monitor I and O   - Monitor volume status   - Check daily weights    - Monitor on telemetry  - Monitor electrolytes   - Cardio, HF, Renal, and EP evals appreciated; F/u recs     # Bradycardia   - SB to 48 BPM at 0400 while asleep on 3/11 and likely related to BB , however last dose prior to event was 3/10 at 0600.  - Resume on BB as above   - Monitor telemetry  - EP eval appreciated; F/u recs     # Tachycardia and Hypoxia  - Tachycardiac and on RA with SPO2 running 90-92   - Could be second to low cardiac output   - CTA with no PE  - Duplex negative for DVT   - On Toprol XL 25 PO QD.   - Monitor HR   - Monitor closely on Tele  - Cardio and Pulm eval appreciated; F/u recs    # JAMEL and Metabolic Acidosis   - Baseline CRE 0.7-1.2 and increased to ~4  - As per OSH documentation, JAMEL was thought to be second to Unasyn/ Bumex / Entresto use and Hypotension   - US RENAL with no hydronephrosis  - Likely cardiorenal induced with low flow state in ADHF, however now rising again and concern for milrinone vs overdiuresis (prerenal) vs cardiorenal.   - Milrinone adjusted and diuresis turned off, however no improvement/ worsened in renal function noted.   - s/p shiley placement and started on HD 2/20  - s/p permacath 3/11  - Passed TOV  - Urine output improved and JAMEL resolved  - Last HD session 3/14  - S/P permacath removal on 3/24  - Avoid nephrotoxic agents  - Monitor Cr and daily BMP   - Renal and HF evals appreciated; F/u recs    # Hyponatremia   - Sodium following   - Urine appears to be concentrated   - Pending serum osmo   - Trend Na    # UTI   - UCx with > 100K klebsiella  - Continue on rocephin   - Monitor and trend CBC, temp curve, VS and adjust as tolerated    # CAD with TVD   - NST abnormal with small-sized, moderate defect in apical wall that is predominantly fixed suggestive of an infarction with minimal marcus-infarct ischemia. Post stress LVEF 25.  - s/p LHC with RCA , severe ostial LM disease, diffuse disease in LAD and LCx, some L to R collaterals (Multivessel CAD)  - Case discussed with CTSx and NOT a candidate for CABG due to comorbidities  - Cardiac MRI with greater than 75% subendocardial scarring of the basal to mid-inferior wall of the LV and 50% scarring of the left ventricular basal inferoseptal wall. There is also left ventricular transmural scarring involving the apical septal wall and likely near transmural scarring of the apical lateral wall.  - s/p RPT LHC 3/4 with LM 80 s/p POBA/ VERA with LM 1, pLAD 90 s/p POBA/ VERA with pLAD 1, and Cx 80 s/p POBA with Cx 10.   - DAPT and Statin per cardiology   - IC, Cards and HF following     # BL LE Edema likely in setting of ADHF  - Remove volume as per Cardio, HF and Renal   - BL LE elevation and compression  - LE Duplex neg   - Monitor for now    # Pericardial effusion likely in setting ADHF  - TTE with trace pericardial effusion   - CT Chest with small pericardial effusion   - Denies chest pain, palpitations, chest tightness or discomfort, shortness of breath or dyspnea   - Repeat serial TTE for further evaluation   - Diuresis per cardio/ renal.  - Monitor on telemetry  - Cardio following    # Pleural effusion likely in setting ADHF  - CT Chest with small BL pleural effusions  - Monitor O2 saturation  - Supplement to maintain > 90%   - Diuresis / HD per cardio/ renal.     # Hypernatremia to hyponatremia  - Hypernatremia likely from HTS vs over diuresis/ intravascular dehydration. HTS and diuresis stopped with improved sodium   - Hyponatremia now noted second to volume overload   - Avoid overcorrection > 6-8 mEq in 24 hours  - Monitor sodium with HD    # Transaminitis  - Likely 2/2 hepatic congestion   - Volume removal with HD as tolerated  - Trend LFTs, if uptrend post-HD initiation, check ABD US  - Serial ABD Examinations    # Leukocytosis likely in setting of BL LE ulcers with pop occlusion   - BCx negative   - UCx with probable contamination   - S/P unasyn for ABX.   - Leukocytosis fluctuating, however appears nontoxic with no fever spikes and monitoring closely off ABX  - If febrile check pan cultures   - Trend CBC, temp curve, VS and adjust as tolerated      # Visual Disturbance  - CT Head w/ old infarcts  - On ASA and Statin   - Opthalmology eval appreciated    # Indigestion and Constipation   - PPI, miralax and senna continued  - Monitor BMs    # Anemia likely mixed AOCD vs iron deficiency   - HH stable in 9s on HSQ  - Anemia panel with AOCD   - Started on venofer, but ferritin high and now stopped   - Continue on EPO with HD per renal  - Monitor HH   - Transfuse for Hgb < 8  - Maintain active T/S    # Diabetes Mellitus A1C 11.6   - Continue on lantus 13 with lispro 5 and ISS   - Diabetic DASH diet   - Monitor and adjust glucose levels PRN   - Endocrine following; F/u recs     # HLD and HLD  - Continue on lipitor and toprol  - Monitor BP    # DISPO TBD  - Palliative care called and patient remains FULL CODE         Discussed with Attending 63 YO F with PMHx of HFrEF 30-35 and grade 2 ddfxn (last TTE on 01/16/25), DM2, and diabetic foot ulcer. Recent admission at [facility] for ADHF. Patient now represents to OSH and ultimately to [facility] as a transfer for worsening right leg pain and fluid secretion. As per documentation, patient was reported to have severe depletion of RLE blood flow beyond the popliteal vessel at knee and similar findings in the left. Patient was transferred to [facility] for CO2 angiogram with Dr. Baltazar. Of note, patient also with reported visual disturbance for which patient was seen and evaluated by opthalmology. Internal Medicine has been consulted on Ms. Kirby's care for medical management.       # Foot ulcers with worsening RLE pain second to pop artery occlusion +/- diabetic ulcers   - RLE Arterial Duplex with popliteal artery occlusion   - LLE Arterial Duplex with underlying disease cannot be excluded   - s/p angio with pop and AT VERA on 2/24   - c/b necrosis of RLE   - S/P R AKA 3/17   - Continue on Lipitor and DAPT  - Continue pain control with oxy  - Wound care called for dressing recs   - Pain management   - Vascular and Podiatry evals appreciated; F/u recs     # LLE PAD   - DOPPLER 3/21 with Calcified plaque, Moderate stenosis of superficial femoral artery, High-grade stenosis of anterior tibial artery in upper calf, Segmental occlusion of mid and distal posterior tibial artery   - Continue on lipitor and DAPT as above   - Vascular reevals pending    # ADHF/ BiV HFrEF 20   - Recent admission at [facility] for ADHF and on dobutamine outpatient  - TTE 1/16 with EF 30-35 with moderately reduced LVSF and grade 2 ddfxn, normal RVSF , trace TR/ NC, and trace pericardial effusion  - RPT TTE with EF 20, severely decreased LV, decreased RVSF with TAPSE 1.2, and regional wall motion abnormalities present with entire septum, entire apex, entire inferior wall, mid inferolateral segment, and mid anterolateral segment are hypokinetic.   - RHC with RA 20, PA 49/29 (40), PCWP 35, mVO2 51.1, CO/CI 3.8/2.2, SVR 1095 w/ Multivessel CAD   - s/p zaroxyln 10 QD to augment diuresis   - s/p bumex GTT   - s/p HTS to augment diuresis   - RPT limited TTE w/ LVSF severely decreased, reduced RVSF, LVOT VTI 12, mild to mod TR, and mildly elevated right atrial pressure  - s/p bumex 4 IV QD, but became anuric and dc'ed   - RPT ECHO post cath with EF 28 %, regional WMA, multiple segmental abnormalities exist, and reduced RVSF    - Case discussed with HF and cards and monitoring off milrinone GTT  - JAMEL as below resolved and urine output improved   - RPT ECHO with EF 31 with severely decreased LVSD and reduced RVSF   - Continue on torsemide 20 and aldactone per Cards  - Continue on entresto and toprol 25 for GDMT per Cards  - Case discussed with EP and needs to be on GDMT for 3 months before AICD placement and should be stabilized off of inotropic support.    - Monitor I and O   - Monitor volume status   - Check daily weights    - Monitor on telemetry  - Monitor electrolytes   - Cardio, HF, Renal, and EP evals appreciated; F/u recs     # Bradycardia   - SB to 48 BPM at 0400 while asleep on 3/11 and likely related to BB , however last dose prior to event was 3/10 at 0600.  - Resume on BB as above   - Monitor telemetry  - EP eval appreciated; F/u recs     # Tachycardia and Hypoxia  - Tachycardiac and on RA with SPO2 running 90-92   - Could be second to low cardiac output   - CTA with no PE  - Duplex negative for DVT   - On Toprol XL 25 PO QD.   - Monitor HR   - Monitor closely on Tele  - Cardio and Pulm eval appreciated; F/u recs    # JAMEL and Metabolic Acidosis   - Baseline CRE 0.7-1.2 and increased to ~4  - As per OSH documentation, JAMEL was thought to be second to Unasyn/ Bumex / Entresto use and Hypotension   - US RENAL with no hydronephrosis  - Likely cardiorenal induced with low flow state in ADHF, however now rising again and concern for milrinone vs overdiuresis (prerenal) vs cardiorenal.   - Milrinone adjusted and diuresis turned off, however no improvement/ worsened in renal function noted.   - s/p shiley placement and started on HD 2/20  - s/p permacath 3/11  - Passed TOV  - Urine output improved and JAMEL resolved  - Last HD session 3/14  - S/P permacath removal on 3/24  - Avoid nephrotoxic agents  - Monitor Cr and daily BMP   - Renal and HF evals appreciated; F/u recs    # Hyponatremia   - Sodium following   - Urine sodium elevated but osmo low  - Pending serum osmo   - Trend Na    # UTI   - UCx with > 100K klebsiella  - Continue on rocephin   - Monitor and trend CBC, temp curve, VS and adjust as tolerated    # CAD with TVD   - NST abnormal with small-sized, moderate defect in apical wall that is predominantly fixed suggestive of an infarction with minimal marcus-infarct ischemia. Post stress LVEF 25.  - s/p LHC with RCA , severe ostial LM disease, diffuse disease in LAD and LCx, some L to R collaterals (Multivessel CAD)  - Case discussed with CTSx and NOT a candidate for CABG due to comorbidities  - Cardiac MRI with greater than 75% subendocardial scarring of the basal to mid-inferior wall of the LV and 50% scarring of the left ventricular basal inferoseptal wall. There is also left ventricular transmural scarring involving the apical septal wall and likely near transmural scarring of the apical lateral wall.  - s/p RPT LHC 3/4 with LM 80 s/p POBA/ VERA with LM 1, pLAD 90 s/p POBA/ VERA with pLAD 1, and Cx 80 s/p POBA with Cx 10.   - DAPT and Statin per cardiology   - IC, Cards and HF following     # BL LE Edema likely in setting of ADHF  - Remove volume as per Cardio, HF and Renal   - BL LE elevation and compression  - LE Duplex neg   - Monitor for now    # Pericardial effusion likely in setting ADHF  - TTE with trace pericardial effusion   - CT Chest with small pericardial effusion   - Denies chest pain, palpitations, chest tightness or discomfort, shortness of breath or dyspnea   - Repeat serial TTE for further evaluation   - Diuresis per cardio/ renal.  - Monitor on telemetry  - Cardio following    # Pleural effusion likely in setting ADHF  - CT Chest with small BL pleural effusions  - Monitor O2 saturation  - Supplement to maintain > 90%   - Diuresis / HD per cardio/ renal.     # Hypernatremia to hyponatremia  - Hypernatremia likely from HTS vs over diuresis/ intravascular dehydration. HTS and diuresis stopped with improved sodium   - Hyponatremia now noted second to volume overload   - Avoid overcorrection > 6-8 mEq in 24 hours  - Monitor sodium with HD    # Transaminitis  - Likely 2/2 hepatic congestion   - Volume removal with HD as tolerated  - Trend LFTs, if uptrend post-HD initiation, check ABD US  - Serial ABD Examinations    # Leukocytosis likely in setting of BL LE ulcers with pop occlusion   - BCx negative   - UCx with probable contamination   - S/P unasyn for ABX.   - Leukocytosis fluctuating, however appears nontoxic with no fever spikes and monitoring closely off ABX  - If febrile check pan cultures   - Trend CBC, temp curve, VS and adjust as tolerated      # Visual Disturbance  - CT Head w/ old infarcts  - On ASA and Statin   - Opthalmology eval appreciated    # Indigestion and Constipation   - PPI, miralax and senna continued  - Monitor BMs    # Anemia likely mixed AOCD vs iron deficiency   - HH stable in 9s on HSQ  - Anemia panel with AOCD   - Started on venofer, but ferritin high and now stopped   - Continue on EPO with HD per renal  - Monitor HH   - Transfuse for Hgb < 8  - Maintain active T/S    # Diabetes Mellitus A1C 11.6   - Continue on lantus 13 with lispro 5 and ISS   - Diabetic DASH diet   - Monitor and adjust glucose levels PRN   - Endocrine following; F/u recs     # HLD and HLD  - Continue on lipitor and toprol  - Monitor BP    # DISPO TBD  - Palliative care called and patient remains FULL CODE         Discussed with Attending

## 2025-03-25 NOTE — PROGRESS NOTE ADULT - ASSESSMENT
65 y/o F with PMH of CHF (last TTE 1/2025 with EF 30-35%), DM, diabetic foot ulcer, PAD, CAD. Recent admission at [facility] for CHF exacerbation, presented to [facility] as a transfer for worsening R leg pain. Hospital course c/b acute on chronic CHF - TTE with EF 20%, severely decreased LV and RV function, multiple regional motion abnormalities, s/p LHC revealing TVD, pleural/pericardial effusions, JAMEL, leukocytosis suspected to be in the setting of LE wound on IV ABX, persistent RLE pain w/ RLE Arterial Duplex revealing popliteal artery occlusion  Plan for eventual angiogram with vascular when medically optimized. Pulmonary called to consult as pt with c/o SOB.

## 2025-03-26 LAB
ANION GAP SERPL CALC-SCNC: 14 MMOL/L — SIGNIFICANT CHANGE UP (ref 5–17)
BUN SERPL-MCNC: 38 MG/DL — HIGH (ref 7–23)
CALCIUM SERPL-MCNC: 8.5 MG/DL — SIGNIFICANT CHANGE UP (ref 8.4–10.5)
CHLORIDE SERPL-SCNC: 96 MMOL/L — SIGNIFICANT CHANGE UP (ref 96–108)
CO2 SERPL-SCNC: 22 MMOL/L — SIGNIFICANT CHANGE UP (ref 22–31)
CREAT SERPL-MCNC: 1.49 MG/DL — HIGH (ref 0.5–1.3)
EGFR: 39 ML/MIN/1.73M2 — LOW
EGFR: 39 ML/MIN/1.73M2 — LOW
GLUCOSE BLDC GLUCOMTR-MCNC: 116 MG/DL — HIGH (ref 70–99)
GLUCOSE BLDC GLUCOMTR-MCNC: 121 MG/DL — HIGH (ref 70–99)
GLUCOSE BLDC GLUCOMTR-MCNC: 135 MG/DL — HIGH (ref 70–99)
GLUCOSE BLDC GLUCOMTR-MCNC: 217 MG/DL — HIGH (ref 70–99)
GLUCOSE SERPL-MCNC: 169 MG/DL — HIGH (ref 70–99)
HCT VFR BLD CALC: 27 % — LOW (ref 34.5–45)
HGB BLD-MCNC: 9.3 G/DL — LOW (ref 11.5–15.5)
MCHC RBC-ENTMCNC: 25.6 PG — LOW (ref 27–34)
MCHC RBC-ENTMCNC: 34.4 G/DL — SIGNIFICANT CHANGE UP (ref 32–36)
MCV RBC AUTO: 74.4 FL — LOW (ref 80–100)
NRBC BLD AUTO-RTO: 0 /100 WBCS — SIGNIFICANT CHANGE UP (ref 0–0)
OSMOLALITY SERPL: 292 MOSMOL/KG — SIGNIFICANT CHANGE UP (ref 280–301)
PLATELET # BLD AUTO: 297 K/UL — SIGNIFICANT CHANGE UP (ref 150–400)
POTASSIUM SERPL-MCNC: 5.4 MMOL/L — HIGH (ref 3.5–5.3)
POTASSIUM SERPL-SCNC: 5.4 MMOL/L — HIGH (ref 3.5–5.3)
PROCALCITONIN SERPL-MCNC: 0.1 NG/ML — SIGNIFICANT CHANGE UP (ref 0.02–0.1)
RBC # BLD: 3.63 M/UL — LOW (ref 3.8–5.2)
RBC # FLD: 21.5 % — HIGH (ref 10.3–14.5)
SODIUM SERPL-SCNC: 132 MMOL/L — LOW (ref 135–145)
WBC # BLD: 11.4 K/UL — HIGH (ref 3.8–10.5)
WBC # FLD AUTO: 11.4 K/UL — HIGH (ref 3.8–10.5)

## 2025-03-26 PROCEDURE — 99221 1ST HOSP IP/OBS SF/LOW 40: CPT

## 2025-03-26 PROCEDURE — 71045 X-RAY EXAM CHEST 1 VIEW: CPT | Mod: 26

## 2025-03-26 RX ORDER — SODIUM ZIRCONIUM CYCLOSILICATE 5 G/5G
10 POWDER, FOR SUSPENSION ORAL DAILY
Refills: 0 | Status: DISCONTINUED | OUTPATIENT
Start: 2025-03-26 | End: 2025-03-27

## 2025-03-26 RX ORDER — EPOETIN ALFA 10000 [IU]/ML
8000 SOLUTION INTRAVENOUS; SUBCUTANEOUS ONCE
Refills: 0 | Status: COMPLETED | OUTPATIENT
Start: 2025-03-26 | End: 2025-03-26

## 2025-03-26 RX ORDER — SACUBITRIL AND VALSARTAN 6; 6 MG/1; MG/1
1 PELLET ORAL
Refills: 0 | Status: DISCONTINUED | OUTPATIENT
Start: 2025-03-27 | End: 2025-03-27

## 2025-03-26 RX ADMIN — INSULIN GLARGINE-YFGN 17 UNIT(S): 100 INJECTION, SOLUTION SUBCUTANEOUS at 21:23

## 2025-03-26 RX ADMIN — Medication 975 MILLIGRAM(S): at 13:14

## 2025-03-26 RX ADMIN — CLOPIDOGREL BISULFATE 75 MILLIGRAM(S): 75 TABLET, FILM COATED ORAL at 11:58

## 2025-03-26 RX ADMIN — Medication 81 MILLIGRAM(S): at 11:58

## 2025-03-26 RX ADMIN — GABAPENTIN 300 MILLIGRAM(S): 400 CAPSULE ORAL at 21:18

## 2025-03-26 RX ADMIN — Medication 40 MILLIGRAM(S): at 11:59

## 2025-03-26 RX ADMIN — METHOCARBAMOL 750 MILLIGRAM(S): 500 TABLET, FILM COATED ORAL at 05:57

## 2025-03-26 RX ADMIN — Medication 975 MILLIGRAM(S): at 05:57

## 2025-03-26 RX ADMIN — METHOCARBAMOL 750 MILLIGRAM(S): 500 TABLET, FILM COATED ORAL at 17:09

## 2025-03-26 RX ADMIN — SODIUM ZIRCONIUM CYCLOSILICATE 10 GRAM(S): 5 POWDER, FOR SUSPENSION ORAL at 12:02

## 2025-03-26 RX ADMIN — Medication 975 MILLIGRAM(S): at 06:50

## 2025-03-26 RX ADMIN — INSULIN LISPRO 9 UNIT(S): 100 INJECTION, SOLUTION INTRAVENOUS; SUBCUTANEOUS at 17:09

## 2025-03-26 RX ADMIN — INSULIN LISPRO 9 UNIT(S): 100 INJECTION, SOLUTION INTRAVENOUS; SUBCUTANEOUS at 08:27

## 2025-03-26 RX ADMIN — METHOCARBAMOL 750 MILLIGRAM(S): 500 TABLET, FILM COATED ORAL at 02:10

## 2025-03-26 RX ADMIN — Medication 20 MILLIGRAM(S): at 05:57

## 2025-03-26 RX ADMIN — METHOCARBAMOL 750 MILLIGRAM(S): 500 TABLET, FILM COATED ORAL at 11:59

## 2025-03-26 RX ADMIN — INSULIN LISPRO 2: 100 INJECTION, SOLUTION INTRAVENOUS; SUBCUTANEOUS at 08:27

## 2025-03-26 RX ADMIN — HEPARIN SODIUM 5000 UNIT(S): 1000 INJECTION INTRAVENOUS; SUBCUTANEOUS at 05:58

## 2025-03-26 RX ADMIN — Medication 1 TABLET(S): at 11:58

## 2025-03-26 RX ADMIN — ATORVASTATIN CALCIUM 40 MILLIGRAM(S): 80 TABLET, FILM COATED ORAL at 21:13

## 2025-03-26 RX ADMIN — INSULIN LISPRO 9 UNIT(S): 100 INJECTION, SOLUTION INTRAVENOUS; SUBCUTANEOUS at 11:57

## 2025-03-26 RX ADMIN — GABAPENTIN 300 MILLIGRAM(S): 400 CAPSULE ORAL at 13:14

## 2025-03-26 RX ADMIN — Medication 1 APPLICATION(S): at 06:00

## 2025-03-26 RX ADMIN — CEFTRIAXONE 100 MILLIGRAM(S): 500 INJECTION, POWDER, FOR SOLUTION INTRAMUSCULAR; INTRAVENOUS at 08:27

## 2025-03-26 RX ADMIN — Medication 975 MILLIGRAM(S): at 00:00

## 2025-03-26 RX ADMIN — Medication 975 MILLIGRAM(S): at 21:12

## 2025-03-26 RX ADMIN — Medication 3 MILLIGRAM(S): at 21:41

## 2025-03-26 RX ADMIN — Medication 25 MILLIGRAM(S): at 06:01

## 2025-03-26 RX ADMIN — GABAPENTIN 300 MILLIGRAM(S): 400 CAPSULE ORAL at 05:59

## 2025-03-26 RX ADMIN — Medication 2 TABLET(S): at 21:13

## 2025-03-26 RX ADMIN — HEPARIN SODIUM 5000 UNIT(S): 1000 INJECTION INTRAVENOUS; SUBCUTANEOUS at 17:09

## 2025-03-26 NOTE — CONSULT NOTE ADULT - CONSULT REQUESTED DATE/TIME
30-Jan-2025 00:47
03-Feb-2025 14:56
30-Jan-2025 06:34
11-Feb-2025 13:20
15-Feb-2025 08:28
20-Mar-2025 16:11
26-Mar-2025 16:42
30-Jan-2025 08:12
08-Feb-2025 12:34
13-Mar-2025
18-Mar-2025 13:58
21-Feb-2025
30-Jan-2025 09:05
07-Mar-2025 14:58
19-Feb-2025 12:58
14-Feb-2025

## 2025-03-26 NOTE — PROGRESS NOTE ADULT - ASSESSMENT
64F, hx of CHF (last TTE on 1/16/25 EF 30-35%, G2DD), DM, diabetic foot ulcer with a recent admission at [facility] for CHF exacerbation 1/14-1/17 p/w worsening R. leg pain and fluid secretion, was meeting sepsis criteria with podiatry having low suspicion for right cellulitis and admitted for continued management of HF exacerbation and needing ischemic eval.     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  -Left leg Arterial Duplex: Slightly tardus parvus waveform of the left popliteal artery is noted, for which underlying disease cannot be excluded. Posterior tibial artery waveform is nonpulsatile. Anterior tibial artery tardus parvus flow is noted.   Transferred to [facility] for CO2 angiogram as per vascular surgery    #  PAD Wound of lower extremity.   ·  Plan: Podiatry following, b/l serous bullae, no concerns for infection  Found to have RLE coolness  -Right Leg Arterial Duplex: Popliteal artery is occluded with negligible flow of right trifurcation arteries.  -Left leg Arterial Duplex: Slightly tardus parvus waveform of the left popliteal artery is noted, for which underlying disease cannot be excluded. Posterior tibial artery waveform is nonpulsatile. Anterior tibial artery tardus parvus flow is noted.  -Started on heparin gtt and dobutamine gtt -Will transfer to [facility] for CO2 angiogram as per vascular surgery as not candidate for CTA due to worsening SCr  -Keep compression dressing  -LE elevation above heart level at rest.  -Transferred to [facility] for CO2 angiogram   - Overall this patient is at   intermediate  risk (for cardiac death, nonfatal myocardial infarction, and nonfatal cardiac arrest perioperatively for this intermediate  risk procedure).   No cardiac contraindications for CO2 vangiogram  There  are  no further recommendation for risk stratifying imaging/stress testing prior to planned surgery  Seen by Podiatry-No acute pod intervention, local wound care only-   PAD with rest ischemia  s/p AT/Popliteal angioplasty on DAPT  - RLE extensive gangrnous changes to foot dorsum, ankle, heel dorsomedial and lateral lower leg, ischemic changes to 2nd digit and hallux,  Left foot hallux distal tuft well adhered eschar, no acute signs of infection.   -Seen by vascular Plan for AKA   -s/p Right AKA  -LLE Arterial Duplex-Moderate stenosis of the superficial femoral artery in the mid thigh.  High-grade stenosis of the anterior tibial artery in the upper calf.  Segmental occlusion of the mid and distal posterior tibial artery.  -Vascular follow up      # HFrEF (congestive heart failure). Ischemic Cardiomyopathy  ·  Plan: Hx of HF, previous admission in January, on home Lasix 40 qD, Metoprolol Succ 25 qD. Not on Entresto due to insurance issues  Last TTE: LVSF moderately decreased w/ EF 30-35 %. Moderate G2DD. Mild MR  Stress test: small-sized, moderate defect(s) in the apical wall that is predominantly fixed suggestive of an infarction with minimal marcus-infarct ischemia  Ischemic cardiomyopathy  GDMT on hold 2/2 JAMEL  Repeat TTE EF 20% with WMA RAP~~8  Repeat Limited TTE  Taper off Milrinone and start GDMT  Is currently off Hydral/Isdn and Bumex 2/2 soft BP  Continue HD with UF had 2900mL removed yesterday will stop Milrinone and observe  Started  low dose BBlocker Metoprolol tartate 12.5 mg PO q8  03/10-Off Midodrine will reattempt Hyd/Isdn  03/11-Daily HD/UF   03/12-Awake s/p Tunelled HD catheter-Plan for Discharge to [facility]  03/13-EP evaluation Arias episodes  03/14-Increasing necrosis noted Right LE Vascular planning for Right AKA   03/15-Right AKAvs BKA on Monday 03/16- Awake Sinus Rhythm clinically stable for OR tomorrow    03/17-Somnolent not dyspneac for OR today  03/19-POD#2  R AKA No dyspnea BP stable   03/20-POD#3 Stable start GDMT for HF Limited TTE    03/21-Awake afebrile TTE EF 31% no dyspnea Creat 1.03  -HF GDMT-Metoprolol succ 25mg                   - Furosemide 40mg                   - Spironolactone 25 md             03/23-Started on Entresto        # CAD  [facility]-RCA , severe ostial LM disease, diffuse disease in LAD and LCx, some L to R collaterals-seen by CTS advise cMR for viability poor target vessels for CABG-?High risk LM/LAD PCI  Had cMR-LVEF is 25%, greater than 75% subendocardial scarring involving the basal to mid inferior wall of the left ventricle, left ventricular transmural scarring involving the apical septal wall and likely near transmural scarring of the apical lateral wall. 50% scarring of the left ventricular basal inferoseptal wall.  03/05-s/p PCI-LM/LAD   Continue  uninterrupted DAPT        # JAMEL on CKD   Creatinine Trend:1.21 <--1.06<-- 0.96<--1.03 <--0.99<--, 1.09<--1.47 <--1.80<--2.99 <--3.61 <---3.38<--(HD)-- 4.76<--4.07<---3.90<-- 1.17  Has had 3 sessions HD for interrmittent HD to allow contrast based procedures is non oliguric  Plan to continue HD likely will need extended RRT  Srini molina on 03/11  Permacath  Been off dialysis since 03/17  Renal function improved  Permacath removal  03/24

## 2025-03-26 NOTE — PROGRESS NOTE ADULT - ASSESSMENT
64y Female with history of CHF presents as a transfer for LE CO2 angiogram. Nephrology consulted for elevated Scr.    1) JAMEL: due to ATN requiring RRT which had resolved with mild and recurrent JAMEL this morning likely in setting of diuretics. Hold entresto this evening and decrease diuretics as below. S/P TDC removal on 3/24. S/P stiles for urinary retention. Would keep on discharge if patient transferred to rehab today with outpatient Urology follow up for TOV. Avoid nephrotoxins.    2) HTN: BP low normal. Hold entresto this evening and restart on 3/27.    3) LE edema: With excellent UO. Decrease torsemide to 10 mg daily and aldactone to 12.5 mg daily. Would need to evaluate for SGLT2 inhibitor as an outpatient pending stability in renal function. TTE with severely decreased LVSF. Monitor UO.     4) Anemia: Hb borderline with low TSAT. No IV iron given elevated ferritin. S/P Epo 8K X 1 dose 3/12. Will give another dose today. Monitor Hb.    5) Hyperkalemia: Started on standing lokelma 10 grams PO daily. Continue with low K diet.        [facility] NEPHROLOGY  Raji Waterman M.D.  Mars Feliz D.O.  Kelley Parks M.D.  MD Nancy Kulkarni, MSN, ANP-C    Telephone: [phone]  Facsimile: [phone]    [address] [place] [address], [address]  [place] [address]

## 2025-03-26 NOTE — CONSULT NOTE ADULT - REASON FOR ADMISSION
Transfer for CO2 angiogram; PAD and CLTI

## 2025-03-26 NOTE — PROGRESS NOTE ADULT - SUBJECTIVE AND OBJECTIVE BOX
Name of Patient : TOMASZ ALBA  MRN: [id number]  Date of visit: 03-26-25       Subjective: Patient seen and examined. No new events except as noted.   Doing okay         REVIEW OF SYSTEMS:    CONSTITUTIONAL: No weakness, fevers or chills  EYES/ENT: No visual changes;  No vertigo or throat pain   NECK: No pain or stiffness  RESPIRATORY: No cough, wheezing, hemoptysis; No shortness of breath  CARDIOVASCULAR: No chest pain or palpitations  GASTROINTESTINAL: No abdominal or epigastric pain. No nausea, vomiting, or hematemesis; No diarrhea or constipation. No melena or hematochezia.  GENITOURINARY: No dysuria, frequency or hematuria  NEUROLOGICAL: No numbness or weakness  SKIN: No itching, burning, rashes, or lesions   All other review of systems is negative unless indicated above.    MEDICATIONS:  MEDICATIONS  (STANDING):  acetaminophen     Tablet .. 975 milliGRAM(s) Oral every 8 hours  aspirin enteric coated 81 milliGRAM(s) Oral daily  atorvastatin 40 milliGRAM(s) Oral at bedtime  bisacodyl 5 milliGRAM(s) Oral every 12 hours  chlorhexidine 4% Liquid 1 Application(s) Topical <User Schedule>  clopidogrel Tablet 75 milliGRAM(s) Oral daily  dextrose 5%. 1000 milliLiter(s) (100 mL/Hr) IV Continuous <Continuous>  dextrose 5%. 1000 milliLiter(s) (50 mL/Hr) IV Continuous <Continuous>  dextrose 50% Injectable 25 Gram(s) IV Push once  dextrose 50% Injectable 12.5 Gram(s) IV Push once  dextrose 50% Injectable 25 Gram(s) IV Push once  epoetin day (EPOGEN) Injectable 8000 Unit(s) SubCutaneous once  gabapentin 300 milliGRAM(s) Oral every 8 hours  glucagon  Injectable 1 milliGRAM(s) IntraMuscular once  heparin   Injectable 5000 Unit(s) SubCutaneous every 12 hours  insulin glargine Injectable (LANTUS) 17 Unit(s) SubCutaneous at bedtime  insulin lispro (ADMELOG) corrective regimen sliding scale   SubCutaneous three times a day before meals  insulin lispro (ADMELOG) corrective regimen sliding scale   SubCutaneous at bedtime  insulin lispro Injectable (ADMELOG) 9 Unit(s) SubCutaneous three times a day with meals  methocarbamol 750 milliGRAM(s) Oral every 6 hours  metoprolol succinate ER 25 milliGRAM(s) Oral daily  Nephro-rober 1 Tablet(s) Oral daily  pantoprazole  Injectable 40 milliGRAM(s) IV Push daily  polyethylene glycol 3350 17 Gram(s) Oral daily  senna 2 Tablet(s) Oral at bedtime  sodium zirconium cyclosilicate 10 Gram(s) Oral daily      PHYSICAL EXAM:  T(C): 36.8 (03-26-25 @ 20:46), Max: 37.7 (03-26-25 @ 04:16)  HR: 94 (03-26-25 @ 20:46) (94 - 116)  BP: 92/55 (03-26-25 @ 20:46) (87/50 - 98/63)  RR: 18 (03-26-25 @ 20:46) (18 - 18)  SpO2: 96% (03-26-25 @ 20:46) (91% - 98%)  Wt(kg): --  I&O's Summary    25 Mar 2025 07:01  -  26 Mar 2025 07:00  --------------------------------------------------------  IN: 790 mL / OUT: 2501 mL / NET: -1711 mL          Appearance: Normal	  HEENT:  PERRLA   Lymphatic: No lymphadenopathy   Cardiovascular: Normal S1 S2, no JVD  Respiratory: normal effort , clear  Gastrointestinal:  Soft, Non-tender  Skin: No rashes,  warm to touch  Psychiatry:  Mood & affect appropriate  Musculuskeletal: No edema    recent labs, Imaging and EKGs personally reviewed     03-25-25 @ 07:01  -  03-26-25 @ 07:00  --------------------------------------------------------  IN: 790 mL / OUT: 2501 mL / NET: -1711 mL                          9.3    11.40 )-----------( 297      ( 26 Mar 2025 11:28 )             27.0               03-26    132[L]  |  96  |  38[H]  ----------------------------<  169[H]  5.4[H]   |  22  |  1.49[H]    Ca    8.5      26 Mar 2025 06:37                         Urinalysis Basic - ( 26 Mar 2025 06:37 )    Color: x / Appearance: x / SG: x / pH: x  Gluc: 169 mg/dL / Ketone: x  / Bili: x / Urobili: x   Blood: x / Protein: x / Nitrite: x   Leuk Esterase: x / RBC: x / WBC x   Sq Epi: x / Non Sq Epi: x / Bacteria: x

## 2025-03-26 NOTE — CONSULT NOTE ADULT - ASSESSMENT
A/P: 64F, hx of CHF (last TTE on 1/16/25 EF 30-35%, G2DD), DM, diabetic foot ulcer with a recent admission at [facility] for CHF exacerbation presented as a transfer for worsening R. leg pain and fluid secretion, On non-invasive imaging demonstrating severe depletion of RLE blood flow beyond the popliteal vessel at knee and similar findings in L    Wound Consult requested to assist w/ management of  BL buttocks resolving skin injury    Recommendations:   Buttocks/ Sacrum  cavilon daily Triad BID and prn soiling     Moisturize intact skin w/ SWEEN cream BID  Nutrition Consult for optimization in pt w/ Increased nutritional needs            encourage high quality protein, beatrice/ prosource, MVI & Vit C to promote wound healing  Continue turning and positioning w/ offloading assistive devices as per protocol         Continue w/ attends under pads and Pericare w/ stiles care as per protocol  Waffle Cushion to chair when oob to chair  Continue w/ low air loss pressure redistribution bed surface   Pt will need Group 2 mattress on hospital bed and ROHO cushion for wheel chair upon discharge home  Care as per medicine, will follow w/ you  Upon discharge f/u as outpatient at [facility] [address] [place] [address] [phone]  Seen and D/w team & RN  Thank you for this consult,  CANDACE Allen-BC, [facility]  [phone]  Nights/ Weekends/ Holidays please call:  General Surgery Consult pager ([phone]) for emergencies  Wound PT for multilayer leg wrapping or VAC issues (x [phone])

## 2025-03-26 NOTE — PROGRESS NOTE ADULT - ASSESSMENT
63 YO F with PMHx of HFrEF 30-35 and grade 2 ddfxn (last TTE on 01/16/25), DM2, and diabetic foot ulcer. Recent admission at [facility] for ADHF. Patient now represents to OSH and ultimately to [facility] as a transfer for worsening right leg pain and fluid secretion. As per documentation, patient was reported to have severe depletion of RLE blood flow beyond the popliteal vessel at knee and similar findings in the left. Patient was transferred to [facility] for CO2 angiogram with Dr. Baltazar. Of note, patient also with reported visual disturbance for which patient was seen and evaluated by opthalmology. Internal Medicine has been consulted on Ms. Kirby's care for medical management.       # Foot ulcers with worsening RLE pain second to pop artery occlusion +/- diabetic ulcers   - RLE Arterial Duplex with popliteal artery occlusion   - LLE Arterial Duplex with underlying disease cannot be excluded   - s/p angio with pop and AT VERA on 2/24   - c/b necrosis of RLE   - S/P R AKA 3/17   - Continue on Lipitor and DAPT  - Continue pain control with oxy  - Wound care called for dressing recs   - Pain management   - Vascular and Podiatry evals appreciated; F/u recs     # LLE PAD   - DOPPLER 3/21 with Calcified plaque, Moderate stenosis of superficial femoral artery, High-grade stenosis of anterior tibial artery in upper calf, Segmental occlusion of mid and distal posterior tibial artery   - Continue on lipitor and DAPT as above   - Vascular reevals pending    # ADHF/ BiV HFrEF 20   - Recent admission at [facility] for ADHF and on dobutamine outpatient  - TTE 1/16 with EF 30-35 with moderately reduced LVSF and grade 2 ddfxn, normal RVSF , trace TR/ NM, and trace pericardial effusion  - RPT TTE with EF 20, severely decreased LV, decreased RVSF with TAPSE 1.2, and regional wall motion abnormalities present with entire septum, entire apex, entire inferior wall, mid inferolateral segment, and mid anterolateral segment are hypokinetic.   - RHC with RA 20, PA 49/29 (40), PCWP 35, mVO2 51.1, CO/CI 3.8/2.2, SVR 1095 w/ Multivessel CAD   - s/p zaroxyln 10 QD to augment diuresis   - s/p bumex GTT   - s/p HTS to augment diuresis   - RPT limited TTE w/ LVSF severely decreased, reduced RVSF, LVOT VTI 12, mild to mod TR, and mildly elevated right atrial pressure  - s/p bumex 4 IV QD, but became anuric and dc'ed   - RPT ECHO post cath with EF 28 %, regional WMA, multiple segmental abnormalities exist, and reduced RVSF    - Case discussed with HF and cards and monitoring off milrinone GTT  - JAMEL as below resolved and urine output improved   - RPT ECHO with EF 31 with severely decreased LVSD and reduced RVSF   - Continue on torsemide 20 and aldactone per Cards  - Continue on entresto and toprol 25 for GDMT per Cards  - Case discussed with EP and needs to be on GDMT for 3 months before AICD placement and should be stabilized off of inotropic support.    - Monitor I and O   - Monitor volume status   - Check daily weights    - Monitor on telemetry  - Monitor electrolytes   - Cardio, HF, Renal, and EP evals appreciated; F/u recs     # Bradycardia   - SB to 48 BPM at 0400 while asleep on 3/11 and likely related to BB , however last dose prior to event was 3/10 at 0600.  - Resume on BB as above   - Monitor telemetry  - EP eval appreciated; F/u recs     # Tachycardia and Hypoxia  - Tachycardiac and on RA with SPO2 running 90-92   - Could be second to low cardiac output   - CTA with no PE  - Duplex negative for DVT   - On Toprol XL 25 PO QD.   - Monitor HR   - Monitor closely on Tele  - Cardio and Pulm eval appreciated; F/u recs    # JAMEL and Metabolic Acidosis   - Baseline CRE 0.7-1.2 and increased to ~4  - As per OSH documentation, JAMEL was thought to be second to Unasyn/ Bumex / Entresto use and Hypotension   - US RENAL with no hydronephrosis  - Likely cardiorenal induced with low flow state in ADHF, however now rising again and concern for milrinone vs overdiuresis (prerenal) vs cardiorenal.   - Milrinone adjusted and diuresis turned off, however no improvement/ worsened in renal function noted.   - s/p shiley placement and started on HD 2/20  - s/p permacath 3/11  - Passed TOV  - Urine output improved and JAMEL resolved  - Last HD session 3/14  - S/P permacath removal on 3/24  - Avoid nephrotoxic agents  - Monitor Cr and daily BMP   - Renal and HF evals appreciated; F/u recs    # Hyponatremia   - Sodium following   - Urine sodium elevated but osmo low  - Pending serum osmo   - Trend Na    # UTI   - UCx with > 100K klebsiella  - Continue on rocephin   - Monitor and trend CBC, temp curve, VS and adjust as tolerated    # CAD with TVD   - NST abnormal with small-sized, moderate defect in apical wall that is predominantly fixed suggestive of an infarction with minimal marcus-infarct ischemia. Post stress LVEF 25.  - s/p LHC with RCA , severe ostial LM disease, diffuse disease in LAD and LCx, some L to R collaterals (Multivessel CAD)  - Case discussed with CTSx and NOT a candidate for CABG due to comorbidities  - Cardiac MRI with greater than 75% subendocardial scarring of the basal to mid-inferior wall of the LV and 50% scarring of the left ventricular basal inferoseptal wall. There is also left ventricular transmural scarring involving the apical septal wall and likely near transmural scarring of the apical lateral wall.  - s/p RPT LHC 3/4 with LM 80 s/p POBA/ VERA with LM 1, pLAD 90 s/p POBA/ VERA with pLAD 1, and Cx 80 s/p POBA with Cx 10.   - DAPT and Statin per cardiology   - IC, Cards and HF following     # BL LE Edema likely in setting of ADHF  - Remove volume as per Cardio, HF and Renal   - BL LE elevation and compression  - LE Duplex neg   - Monitor for now    # Pericardial effusion likely in setting ADHF  - TTE with trace pericardial effusion   - CT Chest with small pericardial effusion   - Denies chest pain, palpitations, chest tightness or discomfort, shortness of breath or dyspnea   - Repeat serial TTE for further evaluation   - Diuresis per cardio/ renal.  - Monitor on telemetry  - Cardio following    # Pleural effusion likely in setting ADHF  - CT Chest with small BL pleural effusions  - Monitor O2 saturation  - Supplement to maintain > 90%   - Diuresis / HD per cardio/ renal.     # Hypernatremia to hyponatremia  - Hypernatremia likely from HTS vs over diuresis/ intravascular dehydration. HTS and diuresis stopped with improved sodium   - Hyponatremia now noted second to volume overload   - Avoid overcorrection > 6-8 mEq in 24 hours  - Monitor sodium with HD    # Transaminitis  - Likely 2/2 hepatic congestion   - Volume removal with HD as tolerated  - Trend LFTs, if uptrend post-HD initiation, check ABD US  - Serial ABD Examinations    # Leukocytosis likely in setting of BL LE ulcers with pop occlusion   - BCx negative   - UCx with probable contamination   - S/P unasyn for ABX.   - Leukocytosis fluctuating, however appears nontoxic with no fever spikes and monitoring closely off ABX  - If febrile check pan cultures   - Trend CBC, temp curve, VS and adjust as tolerated      # Visual Disturbance  - CT Head w/ old infarcts  - On ASA and Statin   - Opthalmology eval appreciated    # Indigestion and Constipation   - PPI, miralax and senna continued  - Monitor BMs    # Anemia likely mixed AOCD vs iron deficiency   - HH stable in 9s on HSQ  - Anemia panel with AOCD   - Started on venofer, but ferritin high and now stopped   - Continue on EPO with HD per renal  - Monitor HH   - Transfuse for Hgb < 8  - Maintain active T/S    # Diabetes Mellitus A1C 11.6   - Continue on lantus 13 with lispro 5 and ISS   - Diabetic DASH diet   - Monitor and adjust glucose levels PRN   - Endocrine following; F/u recs     # HLD and HLD  - Continue on lipitor and toprol  - Monitor BP    # DISPO TBD  - Palliative care called and patient remains FULL CODE

## 2025-03-26 NOTE — PROGRESS NOTE ADULT - SUBJECTIVE AND OBJECTIVE BOX
[facility] NEPHROLOGY- PROGRESS NOTE    64y Female with history of CHF presents as a transfer for LE CO2 angiogram. Nephrology consulted for elevated Scr.    Patient s/p R AKA on 3/17.     REVIEW OF SYSTEMS:  Gen: no fevers  Cards: no chest pain  Resp: no dyspnea  GI: no nausea or vomiting or diarrhea  Vascular: no LLE edema    Augmentin (Stomach Upset; Vomiting; Nausea)  No Known Allergies      Hospital Medications: Medications reviewed        VITALS:  T(F): 99.8 (03-26-25 @ 08:37), Max: 99.8 (03-26-25 @ 04:16)  HR: 104 (03-26-25 @ 08:37)  BP: 93/59 (03-26-25 @ 08:37)  RR: 18 (03-26-25 @ 08:37)  SpO2: 91% (03-26-25 @ 08:37)  Wt(kg): --    03-25 @ 07:01  -  03-26 @ 07:00  --------------------------------------------------------  IN: 790 mL / OUT: 2501 mL / NET: -1711 mL        PHYSICAL EXAM:  Gen: NAD, calm  Cards: RRR, +S1/S2, no M/G/R  Resp: bibasilar rales  GI: soft, NT/ND, NABS  : + stiles  Vascular: R AKA, no LLE edema, + RIJ TDC removed with site unremarkable         LABS:  03-26    132[L]  |  96  |  38[H]  ----------------------------<  169[H]  5.4[H]   |  22  |  1.49[H]    Ca    8.5      26 Mar 2025 06:37      Creatinine Trend: 1.49 <--, 1.21 <--, 1.07 <--, 1.06 <--, 0.96 <--, 1.03 <--, 1.06 <--                        9.2    9.33  )-----------( 287      ( 25 Mar 2025 05:32 )             27.1     Urine Studies:  Urinalysis Basic - ( 26 Mar 2025 06:37 )    Color:  / Appearance:  / SG:  / pH:   Gluc: 169 mg/dL / Ketone:   / Bili:  / Urobili:    Blood:  / Protein:  / Nitrite:    Leuk Esterase:  / RBC:  / WBC    Sq Epi:  / Non Sq Epi:  / Bacteria:       Sodium, Random Urine: 85 mmol/L (03-25 @ 13:16)  Osmolality, Random Urine: 260 mos/kg (03-25 @ 13:16)

## 2025-03-26 NOTE — CONSULT NOTE ADULT - CONSULT REASON
ADHF
Medical management
Elevated Scr
Wound Consult
bradycardia
Bilateral foot wounds
CAD
Visual Disturbance
non-tunneled dialysis catheter
HFrEF
Pain Management
Uncontrolled Type 2 DM
Permacath
Permacath removal on Monday 3/24/25
SOB
family request

## 2025-03-26 NOTE — CONSULT NOTE ADULT - CONSULT REQUESTED BY NAME
Shallow, Nancy E
Dr. Lu
Mario Lu
Nunu Gaitan
Surgery
Medicine
Medicine
Dr Freddy Martin
Dr. Baltazar
Dr. RADHA Gilmore
primary team
Dr. Baltazar
Family request
Vascular
RN
Dr Yordy Gilmore

## 2025-03-26 NOTE — PROGRESS NOTE ADULT - SUBJECTIVE AND OBJECTIVE BOX
MR#[id number]  PATIENT NAME:COLE ALBA    DATE OF SERVICE: 03-26-25 @ 06:39  Patient was seen and examined by Yordy Gilmore MD on    03-26-25 @ 06:39 .  Interim events noted.Consultant notes ,Labs,Telemetry reviewed by me       HOSPITAL COURSE: HPI:  64F, hx of CHF (last TTE on 1/16/25 EF 30-35%, G2DD), DM, diabetic foot ulcer with a recent admission at [facility] for CHF exacerbation presented as a transfer for worsening R. leg pain and fluid secretion, On non-invasive imaging demonstrating severe depletion of RLE blood flow beyond the popliteal vessel at knee and similar findings in L. Was transferred to [facility] for CO2 angiogram with Dr. Baltazar. (29 Jan 2025 20:05)      INTERIM EVENTS:Patient seen at bedside ,interim events noted.  03/05-Awake s/p LHC VERA x 1 to LAD, VERA to LM , POBA to CX via RFA  03/18-POD#1 Right AKA  03/22-Awake alert no dyspnea - LLE arterial US results:   Moderate stenosis of the superficial femoral artery in the mid thigh.  High-grade stenosis of the anterior tibial artery in the upper calf.  Segmental occlusion of the mid and distal posterior tibial artery.  03/23-Awake calert pain controlled Sinus Rhythm Good UOP now has Montgomery  03/24-Awake not orthopneac good UOP Awaiting Vascular follow up-Left LE-for Permacath removed  03/25-AWake alert Sinus Rhythm awaiting Vascular    03/26-Awake alert pain controlled not orthopneac UOP- 2251 mL    PMH -reviewed admission note, no change since admission  HEART FAILURE: Acute[ x]Chronic[ ] Systolic[x ] Diastolic[ ] Combined Systolic and Diastolic[ ]  CAD[x ] CABG[ ] PCI[x ]  DEVICES[ ] PPM[ ] ICD[ ] ILR[ ]  ATRIAL FIBRILLATION[ ] Paroxysmal[ ] Permanent[ ] CHADS2-[  ]  JAMEL[ ] CKD1[ ] CKD2[ ] CKD3[ ] CKD4[ ] ESRD[ ]  COPD[ ] HTN[x ]   DM[x ] Type1[ ] Type 2[x ]   CVA[ ] Paresis[ ]    AMBULATION: Assisted[x ] Cane/walker[ ] Independent[ ]      MEDICATIONS  (STANDING):  acetaminophen     Tablet .. 975 milliGRAM(s) Oral every 8 hours  aspirin enteric coated 81 milliGRAM(s) Oral daily  atorvastatin 40 milliGRAM(s) Oral at bedtime  bisacodyl 5 milliGRAM(s) Oral every 12 hours  cefTRIAXone   IVPB 1000 milliGRAM(s) IV Intermittent every 24 hours  chlorhexidine 4% Liquid 1 Application(s) Topical <User Schedule>  clopidogrel Tablet 75 milliGRAM(s) Oral daily  gabapentin 300 milliGRAM(s) Oral every 8 hours  glucagon  Injectable 1 milliGRAM(s) IntraMuscular once  heparin   Injectable 5000 Unit(s) SubCutaneous every 12 hours  insulin glargine Injectable (LANTUS) 17 Unit(s) SubCutaneous at bedtime  insulin lispro (ADMELOG) corrective regimen sliding scale   SubCutaneous three times a day before meals  insulin lispro (ADMELOG) corrective regimen sliding scale   SubCutaneous at bedtime  insulin lispro Injectable (ADMELOG) 9 Unit(s) SubCutaneous three times a day with meals  methocarbamol 750 milliGRAM(s) Oral every 6 hours  metoprolol succinate ER 25 milliGRAM(s) Oral daily  Nephro-rober 1 Tablet(s) Oral daily  pantoprazole  Injectable 40 milliGRAM(s) IV Push daily  polyethylene glycol 3350 17 Gram(s) Oral daily  sacubitril 24 mG/valsartan 26 mG 1 Tablet(s) Oral two times a day  senna 2 Tablet(s) Oral at bedtime  spironolactone 25 milliGRAM(s) Oral daily  torsemide 20 milliGRAM(s) Oral daily    MEDICATIONS  (PRN):  dextrose Oral Gel 15 Gram(s) Oral once PRN Blood Glucose LESS THAN 70 milliGRAM(s)/deciliter  melatonin 3 milliGRAM(s) Oral at bedtime PRN Insomnia  ondansetron Injectable 4 milliGRAM(s) IV Push every 6 hours PRN Nausea and/or Vomiting  oxyCODONE    IR 5 milliGRAM(s) Oral every 4 hours PRN Severe Pain (7 - 10)  sodium chloride 0.65% Nasal 1 Spray(s) Both Nostrils three times a day PRN Dryness  sodium chloride 0.9% lock flush 10 milliLiter(s) IV Push every 1 hour PRN Pre/post blood products, medications, blood draw, and to maintain line patency            REVIEW OF SYSTEMS:  Constitutional: [ ] fever, [ ]weight loss,  [ x]fatigue [ ]weight gain  Eyes: [ ] visual changes  Respiratory: [ ]shortness of breath;  [ ] cough, [ ]wheezing, [ ]chills, [ ]hemoptysis  Cardiovascular: [ ] chest pain, [ ]palpitations, [ ]dizziness,  [ ]leg swelling[ ]orthopnea[ ]PND  Gastrointestinal: [ ] abdominal pain, [ ]nausea, [ ]vomiting,  [ ]diarrhea [ ]Constipation [ ]Melena  Genitourinary: [ ] dysuria, [ ] hematuria [ ]Montgomery  Neurologic: [ ] headaches [ ] tremors[ ]weakness [ ]Paralysis Right[ ] Left[ ]  Skin: [ ] itching, [ ]burning, [ ] rashes  Endocrine: [ ] heat or cold intolerance  Musculoskeletal: [ ] joint pain or swelling; [ ] muscle, back, or extremity pain  Psychiatric: [ ] depression, [ ]anxiety, [ ]mood swings, or [ ]difficulty sleeping  Hematologic: [ ] easy bruising, [ ] bleeding gums    [ ] All remaining systems negative except as per above.   [ ]Unable to obtain.  [x] No change in ROS since admission      Vital Signs Last 24 Hrs  T(C): 37.7 (26 Mar 2025 04:16), Max: 37.7 (26 Mar 2025 04:16)  T(F): 99.8 (26 Mar 2025 04:16), Max: 99.8 (26 Mar 2025 04:16)  HR: 116 (26 Mar 2025 04:16) (98 - 116)  BP: 98/63 (26 Mar 2025 04:16) (90/54 - 110/70)  RR: 18 (26 Mar 2025 04:16) (18 - 18)  SpO2: 98% (26 Mar 2025 04:16) (93% - 98%)    Parameters below as of 26 Mar 2025 04:16  Patient On (Oxygen Delivery Method): nasal cannula      I&O's Summary    24 Mar 2025 07:01  -  25 Mar 2025 07:00  --------------------------------------------------------  IN: 550 mL / OUT: 2500 mL / NET: -1950 mL    25 Mar 2025 07:01  -  26 Mar 2025 06:39  --------------------------------------------------------  IN: 550 mL / OUT: 2251 mL / NET: -1701 mL        PHYSICAL EXAM:  General: No acute distress BMI-24  HEENT: EOMI, PERRL  Neck: Supple, [ ] JVD  Lungs: Equal air entry bilaterally; [ ] rales [ ] wheezing [ ] rhonchi  Heart: Regular rate and rhythm; [x ] murmur   2/6 [ x] systolic [ ] diastolic [ ] radiation[ ] rubs [ ]  gallops  Abdomen: Nontender, bowel sounds present  Extremities: No clubbing, cyanosis, [x ] edema [ x] Right AKA   Nervous system:  Alert & Oriented X3, no focal deficits  Psychiatric: Normal affect  Skin: No rashes or lesions    LABS:  03-25    129[L]  |  95[L]  |  32[H]  ----------------------------<  159[H]  5.0   |  22  |  1.21    Ca    8.5      25 Mar 2025 05:31      Creatinine Trend: 1.21<--, 1.07<--, 1.06<--, 0.96<--, 1.03<--, 1.06<--                        9.2    9.33  )-----------( 287      ( 25 Mar 2025 05:32 )             27.1           VA Duplex Low Ext Arterial, Ltd, Left (03.21.25 @ 16:22) >  IMPRESSION:    Calcified plaque.  Moderate stenosis of the superficial femoral artery in the mid thigh.  High-grade stenosis of the anterior tibial artery in the upper calf.  Segmental occlusion of the mid and distal posterior tibial artery.  Examination findings were conveyed to nurse practitioner Lucho by  vascular technologist Cameron at 1620 hours on 3/21/2025 with read back.      TTE Limited W or WO Ultrasound Enhancing Agent (03.20.25 @ 17:08) >  CONCLUSIONS:      1. Left ventricular systolic function is severely decreased with an ejection fraction of 31 % by Winchester's method of disks.   2. Multiple segmental abnormalities exist. See findings.   3. Normal right ventricular cavity size and reduced right ventricular systolic function.   4. No pericardial effusion seen.   5. Compared to the transthoracic echocardiogram performed on 3/10/2025, there havebeen no significant interval changes.   6. There is no evidence of a left ventricular thrombus.   7. There is normal LV mass and normal geometry.    12 Lead ECG (03.11.25 @ 04:35) >  SINUS RHYTHM WITHMARKED SINUS ARRHYTHMIA  POSSIBLE ANTERIOR INFARCT , AGE UNDETERMINED  ABNORMAL ECG        Cardiac Catheterization (03.04.25 @ 09:07) >  Conclusions:   Impella placed for HR-PCI (pt was turned down for CABG). EF 25%     Severe proximal LCx stenosis s/p successful rotational atherectomy and balloon angioplasty.   Severe LM and proximal LAD stenosis s/p successful rotational atherectomy, balloon angioplasty, and VERA x2.

## 2025-03-26 NOTE — CONSULT NOTE ADULT - PROVIDER SPECIALTY LIST ADULT
Intervent Radiology
Pain Medicine
Palliative Care
Podiatry
Internal Medicine
Electrophysiology
Endocrinology
Nephrology
Ophthalmology
Intervent Radiology
Wound Care
Heart Failure
Intervent Radiology
Pulmonology
CT Surgery
Cardiology

## 2025-03-26 NOTE — CONSULT NOTE ADULT - SUBJECTIVE AND OBJECTIVE BOX
Wound SURGERY CONSULT NOTE    HPI:  64F, hx of CHF (last TTE on 1/16/25 EF 30-35%, G2DD), DM, diabetic foot ulcer with a recent admission at [facility] for CHF exacerbation presented as a transfer for worsening R. leg pain and fluid secretion, On non-invasive imaging demonstrating severe depletion of RLE blood flow beyond the popliteal vessel at knee and similar findings in L. Was transferred to [facility] for CO2 angiogram with Dr. Baltazar. (29 Jan 2025 20:05)        Wound consult requested by team to assist w/ management of skin injury.   Pt w/  c/o pain and w/o c/o drainage, odor, color change,  or worsening swelling. Offloading and pericare initiated upon admission as pt Increasingly sedentary 2/2 to illness. Pt is Incontinent of urine & stool. (+)stiles.   Appetite good. All questions asked and answered to pt's expressed understanding and satisfaction.    Current Diet: Diet, Regular:   Consistent Carbohydrate No Snacks (CSTCHO)  DASH/TLC Sodium & Cholesterol Restricted (DASH)  1000mL Fluid Restriction ([id number])  No Concentrated Potassium  Halal  Lacto Veg (Accepts Milk & Milk Products) (03-24-25 @ 09:25)      PAST MEDICAL & SURGICAL HISTORY:  DM (diabetes mellitus)      Diabetic foot ulcer      Congestive heart failure (CHF)      CAD (coronary artery disease)      Cataract of both eyes, unspecified cataract type      History of cholecystectomy          REVIEW OF SYSTEMS:   General/ Breast/ Skin/Vasc/ Neuro/ MSK: see HPI  All other systems negative    MEDICATIONS  (STANDING):  acetaminophen     Tablet .. 975 milliGRAM(s) Oral every 8 hours  aspirin enteric coated 81 milliGRAM(s) Oral daily  atorvastatin 40 milliGRAM(s) Oral at bedtime  bisacodyl 5 milliGRAM(s) Oral every 12 hours  chlorhexidine 4% Liquid 1 Application(s) Topical <User Schedule>  clopidogrel Tablet 75 milliGRAM(s) Oral daily  dextrose 5%. 1000 milliLiter(s) (100 mL/Hr) IV Continuous <Continuous>  dextrose 5%. 1000 milliLiter(s) (50 mL/Hr) IV Continuous <Continuous>  dextrose 50% Injectable 25 Gram(s) IV Push once  dextrose 50% Injectable 12.5 Gram(s) IV Push once  dextrose 50% Injectable 25 Gram(s) IV Push once  epoetin day (EPOGEN) Injectable 8000 Unit(s) SubCutaneous once  gabapentin 300 milliGRAM(s) Oral every 8 hours  glucagon  Injectable 1 milliGRAM(s) IntraMuscular once  heparin   Injectable 5000 Unit(s) SubCutaneous every 12 hours  insulin glargine Injectable (LANTUS) 17 Unit(s) SubCutaneous at bedtime  insulin lispro (ADMELOG) corrective regimen sliding scale   SubCutaneous three times a day before meals  insulin lispro (ADMELOG) corrective regimen sliding scale   SubCutaneous at bedtime  insulin lispro Injectable (ADMELOG) 9 Unit(s) SubCutaneous three times a day with meals  methocarbamol 750 milliGRAM(s) Oral every 6 hours  metoprolol succinate ER 25 milliGRAM(s) Oral daily  Nephro-rober 1 Tablet(s) Oral daily  pantoprazole  Injectable 40 milliGRAM(s) IV Push daily  polyethylene glycol 3350 17 Gram(s) Oral daily  senna 2 Tablet(s) Oral at bedtime  sodium zirconium cyclosilicate 10 Gram(s) Oral daily    MEDICATIONS  (PRN):  dextrose Oral Gel 15 Gram(s) Oral once PRN Blood Glucose LESS THAN 70 milliGRAM(s)/deciliter  melatonin 3 milliGRAM(s) Oral at bedtime PRN Insomnia  ondansetron Injectable 4 milliGRAM(s) IV Push every 6 hours PRN Nausea and/or Vomiting  oxyCODONE    IR 5 milliGRAM(s) Oral every 4 hours PRN Severe Pain (7 - 10)  sodium chloride 0.65% Nasal 1 Spray(s) Both Nostrils three times a day PRN Dryness  sodium chloride 0.9% lock flush 10 milliLiter(s) IV Push every 1 hour PRN Pre/post blood products, medications, blood draw, and to maintain line patency      Allergies    No Known Allergies    Intolerances    Augmentin (Stomach Upset; Vomiting; Nausea)      SOCIAL HISTORY:      no hx of smoking, ETOH, drugs    FAMILY HISTORY:    Family history of CHF (congestive heart failure) (Mother)      PHYSICAL EXAM:  Vital Signs Last 24 Hrs  T(C): 36.7 (26 Mar 2025 11:18), Max: 37.7 (26 Mar 2025 04:16)  T(F): 98.1 (26 Mar 2025 11:18), Max: 99.8 (26 Mar 2025 04:16)  HR: 103 (26 Mar 2025 11:18) (103 - 116)  BP: 93/60 (26 Mar 2025 11:18) (93/59 - 99/63)  BP(mean): --  RR: 18 (26 Mar 2025 11:18) (18 - 18)  SpO2: 94% (26 Mar 2025 11:18) (91% - 98%)    Parameters below as of 26 Mar 2025 11:18  Patient On (Oxygen Delivery Method): room air        NAD,  A&Ox3/ Alert/ Obese  WD/ WN  Versa Care P500 bed  HEENT:  sclera clear, mucosa moist, throat clear, trachea midline, neck supple  Respiratory: nonlabored w/ equal chest rise  Gastrointestinal: soft NT/ND   : (+)stiles  Neurology:  verbal,  follows commands  Psych: calm/ appropriate  Musculoskeletal:  no deformities/ contractures  Vascular: BLE equally warm,  no cyanosis, clubbing, edema nor acute ischemia          Rt AKA deferred to vascular         Lt foot deferred to DPM  Skin:  moist w/ good turgor  Right buttocks with partial thickness skin loss 3.5cm x 1.5cm x 0.1 cm  Left buttocks with partial thickness tissue loss 2.5cm x 1.0 cm x 0.1cm      There is no blistering, drainage, no odor       no increased warmth, tenderness, induration, fluctuance, nor crepitus    LABS/ CULTURES/ RADIOLOGY:                        9.3    11.40 )-----------( 297      ( 26 Mar 2025 11:28 )             27.0       132  |  96  |  38  ----------------------------<  169      [03-26-25 @ 06:37]  5.4   |  22  |  1.49        Ca     8.5     [03-26-25 @ 06:37]          Serum Osmolality 292      [03-26-25 @ 06:37]      A1C with Estimated Average Glucose Result: 11.6 % (01-24-25 @ 05:40)  A1C with Estimated Average Glucose Result: >15.5 % (12-13-24 @ 06:49)

## 2025-03-27 ENCOUNTER — TRANSCRIPTION ENCOUNTER (OUTPATIENT)
Age: 65
End: 2025-03-27

## 2025-03-27 VITALS
HEART RATE: 99 BPM | DIASTOLIC BLOOD PRESSURE: 60 MMHG | SYSTOLIC BLOOD PRESSURE: 96 MMHG | OXYGEN SATURATION: 95 % | RESPIRATION RATE: 18 BRPM

## 2025-03-27 LAB
-  AMOXICILLIN/CLAVULANIC ACID: SIGNIFICANT CHANGE UP
-  AMPICILLIN/SULBACTAM: SIGNIFICANT CHANGE UP
-  AMPICILLIN/SULBACTAM: SIGNIFICANT CHANGE UP
-  AMPICILLIN: SIGNIFICANT CHANGE UP
-  AMPICILLIN: SIGNIFICANT CHANGE UP
-  AZTREONAM: SIGNIFICANT CHANGE UP
-  AZTREONAM: SIGNIFICANT CHANGE UP
-  CEFAZOLIN: SIGNIFICANT CHANGE UP
-  CEFAZOLIN: SIGNIFICANT CHANGE UP
-  CEFEPIME: SIGNIFICANT CHANGE UP
-  CEFEPIME: SIGNIFICANT CHANGE UP
-  CEFOXITIN: SIGNIFICANT CHANGE UP
-  CEFOXITIN: SIGNIFICANT CHANGE UP
-  CEFTRIAXONE: SIGNIFICANT CHANGE UP
-  CEFTRIAXONE: SIGNIFICANT CHANGE UP
-  CEFUROXIME: SIGNIFICANT CHANGE UP
-  CEFUROXIME: SIGNIFICANT CHANGE UP
-  CIPROFLOXACIN: SIGNIFICANT CHANGE UP
-  CIPROFLOXACIN: SIGNIFICANT CHANGE UP
-  ERTAPENEM: SIGNIFICANT CHANGE UP
-  ERTAPENEM: SIGNIFICANT CHANGE UP
-  GENTAMICIN: SIGNIFICANT CHANGE UP
-  GENTAMICIN: SIGNIFICANT CHANGE UP
-  IMIPENEM: SIGNIFICANT CHANGE UP
-  IMIPENEM: SIGNIFICANT CHANGE UP
-  LEVOFLOXACIN: SIGNIFICANT CHANGE UP
-  LEVOFLOXACIN: SIGNIFICANT CHANGE UP
-  MEROPENEM: SIGNIFICANT CHANGE UP
-  MEROPENEM: SIGNIFICANT CHANGE UP
-  NITROFURANTOIN: SIGNIFICANT CHANGE UP
-  NITROFURANTOIN: SIGNIFICANT CHANGE UP
-  PIPERACILLIN/TAZOBACTAM: SIGNIFICANT CHANGE UP
-  PIPERACILLIN/TAZOBACTAM: SIGNIFICANT CHANGE UP
-  TOBRAMYCIN: SIGNIFICANT CHANGE UP
-  TOBRAMYCIN: SIGNIFICANT CHANGE UP
-  TRIMETHOPRIM/SULFAMETHOXAZOLE: SIGNIFICANT CHANGE UP
-  TRIMETHOPRIM/SULFAMETHOXAZOLE: SIGNIFICANT CHANGE UP
ANION GAP SERPL CALC-SCNC: 14 MMOL/L — SIGNIFICANT CHANGE UP (ref 5–17)
BUN SERPL-MCNC: 38 MG/DL — HIGH (ref 7–23)
CALCIUM SERPL-MCNC: 8.2 MG/DL — LOW (ref 8.4–10.5)
CHLORIDE SERPL-SCNC: 97 MMOL/L — SIGNIFICANT CHANGE UP (ref 96–108)
CO2 SERPL-SCNC: 22 MMOL/L — SIGNIFICANT CHANGE UP (ref 22–31)
CREAT SERPL-MCNC: 1.27 MG/DL — SIGNIFICANT CHANGE UP (ref 0.5–1.3)
CULTURE RESULTS: ABNORMAL
EGFR: 48 ML/MIN/1.73M2 — LOW
EGFR: 48 ML/MIN/1.73M2 — LOW
GLUCOSE BLDC GLUCOMTR-MCNC: 130 MG/DL — HIGH (ref 70–99)
GLUCOSE BLDC GLUCOMTR-MCNC: 221 MG/DL — HIGH (ref 70–99)
GLUCOSE BLDC GLUCOMTR-MCNC: 98 MG/DL — SIGNIFICANT CHANGE UP (ref 70–99)
GLUCOSE SERPL-MCNC: 93 MG/DL — SIGNIFICANT CHANGE UP (ref 70–99)
HCT VFR BLD CALC: 26.2 % — LOW (ref 34.5–45)
HGB BLD-MCNC: 9.1 G/DL — LOW (ref 11.5–15.5)
MAGNESIUM SERPL-MCNC: 1.8 MG/DL — SIGNIFICANT CHANGE UP (ref 1.6–2.6)
MCHC RBC-ENTMCNC: 25.7 PG — LOW (ref 27–34)
MCHC RBC-ENTMCNC: 34.7 G/DL — SIGNIFICANT CHANGE UP (ref 32–36)
MCV RBC AUTO: 74 FL — LOW (ref 80–100)
METHOD TYPE: SIGNIFICANT CHANGE UP
METHOD TYPE: SIGNIFICANT CHANGE UP
NRBC BLD AUTO-RTO: 0 /100 WBCS — SIGNIFICANT CHANGE UP (ref 0–0)
ORGANISM # SPEC MICROSCOPIC CNT: ABNORMAL
PLATELET # BLD AUTO: 283 K/UL — SIGNIFICANT CHANGE UP (ref 150–400)
POTASSIUM SERPL-MCNC: 4.6 MMOL/L — SIGNIFICANT CHANGE UP (ref 3.5–5.3)
POTASSIUM SERPL-SCNC: 4.6 MMOL/L — SIGNIFICANT CHANGE UP (ref 3.5–5.3)
RBC # BLD: 3.54 M/UL — LOW (ref 3.8–5.2)
RBC # FLD: 21.8 % — HIGH (ref 10.3–14.5)
SODIUM SERPL-SCNC: 133 MMOL/L — LOW (ref 135–145)
SPECIMEN SOURCE: SIGNIFICANT CHANGE UP
SURGICAL PATHOLOGY STUDY: SIGNIFICANT CHANGE UP
WBC # BLD: 10.25 K/UL — SIGNIFICANT CHANGE UP (ref 3.8–10.5)
WBC # FLD AUTO: 10.25 K/UL — SIGNIFICANT CHANGE UP (ref 3.8–10.5)

## 2025-03-27 PROCEDURE — 84100 ASSAY OF PHOSPHORUS: CPT

## 2025-03-27 PROCEDURE — 87070 CULTURE OTHR SPECIMN AEROBIC: CPT

## 2025-03-27 PROCEDURE — 83935 ASSAY OF URINE OSMOLALITY: CPT

## 2025-03-27 PROCEDURE — 36556 INSERT NON-TUNNEL CV CATH: CPT

## 2025-03-27 PROCEDURE — 81001 URINALYSIS AUTO W/SCOPE: CPT

## 2025-03-27 PROCEDURE — 93321 DOPPLER ECHO F-UP/LMTD STD: CPT

## 2025-03-27 PROCEDURE — 82962 GLUCOSE BLOOD TEST: CPT

## 2025-03-27 PROCEDURE — 75710 ARTERY X-RAYS ARM/LEG: CPT | Mod: 59

## 2025-03-27 PROCEDURE — 71045 X-RAY EXAM CHEST 1 VIEW: CPT

## 2025-03-27 PROCEDURE — 97130 THER IVNTJ EA ADDL 15 MIN: CPT

## 2025-03-27 PROCEDURE — 83735 ASSAY OF MAGNESIUM: CPT

## 2025-03-27 PROCEDURE — 82607 VITAMIN B-12: CPT

## 2025-03-27 PROCEDURE — C1724: CPT

## 2025-03-27 PROCEDURE — 71275 CT ANGIOGRAPHY CHEST: CPT | Mod: MC

## 2025-03-27 PROCEDURE — 84132 ASSAY OF SERUM POTASSIUM: CPT

## 2025-03-27 PROCEDURE — 86901 BLOOD TYPING SEROLOGIC RH(D): CPT

## 2025-03-27 PROCEDURE — 80053 COMPREHEN METABOLIC PANEL: CPT

## 2025-03-27 PROCEDURE — 82570 ASSAY OF URINE CREATININE: CPT

## 2025-03-27 PROCEDURE — C1725: CPT

## 2025-03-27 PROCEDURE — C1889: CPT

## 2025-03-27 PROCEDURE — 97168 OT RE-EVAL EST PLAN CARE: CPT

## 2025-03-27 PROCEDURE — 97161 PT EVAL LOW COMPLEX 20 MIN: CPT

## 2025-03-27 PROCEDURE — 92924 PRQ TRLUML C ATHRC 1 ART&/BR: CPT | Mod: LC

## 2025-03-27 PROCEDURE — 86705 HEP B CORE ANTIBODY IGM: CPT

## 2025-03-27 PROCEDURE — 97129 THER IVNTJ 1ST 15 MIN: CPT

## 2025-03-27 PROCEDURE — 36558 INSERT TUNNELED CV CATH: CPT

## 2025-03-27 PROCEDURE — 33990 INSJ PERQ VAD L HRT ARTERIAL: CPT

## 2025-03-27 PROCEDURE — 87077 CULTURE AEROBIC IDENTIFY: CPT

## 2025-03-27 PROCEDURE — 93308 TTE F-UP OR LMTD: CPT

## 2025-03-27 PROCEDURE — 82010 KETONE BODYS QUAN: CPT

## 2025-03-27 PROCEDURE — 84295 ASSAY OF SERUM SODIUM: CPT

## 2025-03-27 PROCEDURE — 97110 THERAPEUTIC EXERCISES: CPT

## 2025-03-27 PROCEDURE — C1769: CPT

## 2025-03-27 PROCEDURE — 83930 ASSAY OF BLOOD OSMOLALITY: CPT

## 2025-03-27 PROCEDURE — C1874: CPT

## 2025-03-27 PROCEDURE — 76937 US GUIDE VASCULAR ACCESS: CPT

## 2025-03-27 PROCEDURE — 82947 ASSAY GLUCOSE BLOOD QUANT: CPT

## 2025-03-27 PROCEDURE — 80048 BASIC METABOLIC PNL TOTAL CA: CPT

## 2025-03-27 PROCEDURE — 88311 DECALCIFY TISSUE: CPT

## 2025-03-27 PROCEDURE — 93458 L HRT ARTERY/VENTRICLE ANGIO: CPT

## 2025-03-27 PROCEDURE — 86709 HEPATITIS A IGM ANTIBODY: CPT

## 2025-03-27 PROCEDURE — 70450 CT HEAD/BRAIN W/O DYE: CPT | Mod: MC

## 2025-03-27 PROCEDURE — C9399: CPT

## 2025-03-27 PROCEDURE — 93970 EXTREMITY STUDY: CPT

## 2025-03-27 PROCEDURE — 85025 COMPLETE CBC W/AUTO DIFF WBC: CPT

## 2025-03-27 PROCEDURE — 87086 URINE CULTURE/COLONY COUNT: CPT

## 2025-03-27 PROCEDURE — 85018 HEMOGLOBIN: CPT

## 2025-03-27 PROCEDURE — 97116 GAIT TRAINING THERAPY: CPT

## 2025-03-27 PROCEDURE — 82803 BLOOD GASES ANY COMBINATION: CPT

## 2025-03-27 PROCEDURE — 85014 HEMATOCRIT: CPT

## 2025-03-27 PROCEDURE — 97530 THERAPEUTIC ACTIVITIES: CPT

## 2025-03-27 PROCEDURE — 87186 SC STD MICRODIL/AGAR DIL: CPT

## 2025-03-27 PROCEDURE — 87640 STAPH A DNA AMP PROBE: CPT

## 2025-03-27 PROCEDURE — 87340 HEPATITIS B SURFACE AG IA: CPT

## 2025-03-27 PROCEDURE — 86803 HEPATITIS C AB TEST: CPT

## 2025-03-27 PROCEDURE — 85027 COMPLETE CBC AUTOMATED: CPT

## 2025-03-27 PROCEDURE — 83605 ASSAY OF LACTIC ACID: CPT

## 2025-03-27 PROCEDURE — 93926 LOWER EXTREMITY STUDY: CPT

## 2025-03-27 PROCEDURE — C1752: CPT

## 2025-03-27 PROCEDURE — C1750: CPT

## 2025-03-27 PROCEDURE — 83540 ASSAY OF IRON: CPT

## 2025-03-27 PROCEDURE — 93306 TTE W/DOPPLER COMPLETE: CPT

## 2025-03-27 PROCEDURE — 86850 RBC ANTIBODY SCREEN: CPT

## 2025-03-27 PROCEDURE — 97535 SELF CARE MNGMENT TRAINING: CPT

## 2025-03-27 PROCEDURE — 84300 ASSAY OF URINE SODIUM: CPT

## 2025-03-27 PROCEDURE — 86900 BLOOD TYPING SEROLOGIC ABO: CPT

## 2025-03-27 PROCEDURE — 93325 DOPPLER ECHO COLOR FLOW MAPG: CPT

## 2025-03-27 PROCEDURE — 84145 PROCALCITONIN (PCT): CPT

## 2025-03-27 PROCEDURE — 76770 US EXAM ABDO BACK WALL COMP: CPT

## 2025-03-27 PROCEDURE — 37228: CPT

## 2025-03-27 PROCEDURE — 75561 CARDIAC MRI FOR MORPH W/DYE: CPT

## 2025-03-27 PROCEDURE — C8924: CPT

## 2025-03-27 PROCEDURE — 97162 PT EVAL MOD COMPLEX 30 MIN: CPT

## 2025-03-27 PROCEDURE — C9600: CPT | Mod: LM

## 2025-03-27 PROCEDURE — C1760: CPT

## 2025-03-27 PROCEDURE — A9585: CPT

## 2025-03-27 PROCEDURE — 85610 PROTHROMBIN TIME: CPT

## 2025-03-27 PROCEDURE — 82746 ASSAY OF FOLIC ACID SERUM: CPT

## 2025-03-27 PROCEDURE — 82435 ASSAY OF BLOOD CHLORIDE: CPT

## 2025-03-27 PROCEDURE — 93005 ELECTROCARDIOGRAM TRACING: CPT

## 2025-03-27 PROCEDURE — P9047: CPT

## 2025-03-27 PROCEDURE — 86706 HEP B SURFACE ANTIBODY: CPT

## 2025-03-27 PROCEDURE — 93460 R&L HRT ART/VENTRICLE ANGIO: CPT

## 2025-03-27 PROCEDURE — 36589 REMOVAL TUNNELED CV CATH: CPT

## 2025-03-27 PROCEDURE — 36415 COLL VENOUS BLD VENIPUNCTURE: CPT

## 2025-03-27 PROCEDURE — 80076 HEPATIC FUNCTION PANEL: CPT

## 2025-03-27 PROCEDURE — C1894: CPT

## 2025-03-27 PROCEDURE — C9602: CPT | Mod: LD

## 2025-03-27 PROCEDURE — 97112 NEUROMUSCULAR REEDUCATION: CPT

## 2025-03-27 PROCEDURE — C1887: CPT

## 2025-03-27 PROCEDURE — 83550 IRON BINDING TEST: CPT

## 2025-03-27 PROCEDURE — 85730 THROMBOPLASTIN TIME PARTIAL: CPT

## 2025-03-27 PROCEDURE — 37224: CPT

## 2025-03-27 PROCEDURE — 99261: CPT

## 2025-03-27 PROCEDURE — 84156 ASSAY OF PROTEIN URINE: CPT

## 2025-03-27 PROCEDURE — 86704 HEP B CORE ANTIBODY TOTAL: CPT

## 2025-03-27 PROCEDURE — 97166 OT EVAL MOD COMPLEX 45 MIN: CPT

## 2025-03-27 PROCEDURE — 87641 MR-STAPH DNA AMP PROBE: CPT

## 2025-03-27 PROCEDURE — 77001 FLUOROGUIDE FOR VEIN DEVICE: CPT

## 2025-03-27 PROCEDURE — 82728 ASSAY OF FERRITIN: CPT

## 2025-03-27 PROCEDURE — 88307 TISSUE EXAM BY PATHOLOGIST: CPT

## 2025-03-27 PROCEDURE — 82330 ASSAY OF CALCIUM: CPT

## 2025-03-27 PROCEDURE — 86923 COMPATIBILITY TEST ELECTRIC: CPT

## 2025-03-27 RX ORDER — TORSEMIDE 10 MG
1 TABLET ORAL
Qty: 30 | Refills: 0
Start: 2025-03-27 | End: 2025-04-25

## 2025-03-27 RX ORDER — ATORVASTATIN CALCIUM 80 MG/1
1 TABLET, FILM COATED ORAL
Qty: 0 | Refills: 0 | DISCHARGE
Start: 2025-03-27

## 2025-03-27 RX ORDER — ACETAMINOPHEN 500 MG/5ML
3 LIQUID (ML) ORAL
Qty: 0 | Refills: 0 | DISCHARGE
Start: 2025-03-27

## 2025-03-27 RX ORDER — METOPROLOL SUCCINATE 50 MG/1
1 TABLET, EXTENDED RELEASE ORAL
Qty: 0 | Refills: 0 | DISCHARGE
Start: 2025-03-27

## 2025-03-27 RX ORDER — INSULIN ASPART 100 [IU]/ML
0 INJECTION, SOLUTION INTRAVENOUS; SUBCUTANEOUS
Refills: 0 | DISCHARGE

## 2025-03-27 RX ORDER — INSULIN GLARGINE-YFGN 100 [IU]/ML
16 INJECTION, SOLUTION SUBCUTANEOUS
Qty: 1 | Refills: 0
Start: 2025-03-27

## 2025-03-27 RX ORDER — METHOCARBAMOL 500 MG/1
1 TABLET, FILM COATED ORAL
Qty: 0 | Refills: 0 | DISCHARGE
Start: 2025-03-27

## 2025-03-27 RX ORDER — SPIRONOLACTONE 25 MG
0.5 TABLET ORAL
Qty: 15 | Refills: 0
Start: 2025-03-27 | End: 2025-04-25

## 2025-03-27 RX ORDER — CEFTRIAXONE 500 MG/1
1 INJECTION, POWDER, FOR SOLUTION INTRAMUSCULAR; INTRAVENOUS
Qty: 3 | Refills: 0
Start: 2025-03-27 | End: 2025-03-29

## 2025-03-27 RX ORDER — CEFTRIAXONE 500 MG/1
1000 INJECTION, POWDER, FOR SOLUTION INTRAMUSCULAR; INTRAVENOUS EVERY 24 HOURS
Refills: 0 | Status: DISCONTINUED | OUTPATIENT
Start: 2025-03-27 | End: 2025-03-27

## 2025-03-27 RX ORDER — HYDROMORPHONE/SOD CHLOR,ISO/PF 2 MG/10 ML
0.5 SYRINGE (ML) INJECTION ONCE
Refills: 0 | Status: DISCONTINUED | OUTPATIENT
Start: 2025-03-27 | End: 2025-03-27

## 2025-03-27 RX ORDER — FUROSEMIDE 10 MG/ML
1 INJECTION INTRAMUSCULAR; INTRAVENOUS
Refills: 0 | DISCHARGE

## 2025-03-27 RX ORDER — GABAPENTIN 400 MG/1
1 CAPSULE ORAL
Qty: 0 | Refills: 0 | DISCHARGE
Start: 2025-03-27

## 2025-03-27 RX ORDER — INSULIN DEGLUDEC 100 U/ML
40 INJECTION, SOLUTION SUBCUTANEOUS
Refills: 0 | DISCHARGE

## 2025-03-27 RX ORDER — INSULIN GLARGINE-YFGN 100 [IU]/ML
16 INJECTION, SOLUTION SUBCUTANEOUS AT BEDTIME
Refills: 0 | Status: DISCONTINUED | OUTPATIENT
Start: 2025-03-27 | End: 2025-03-27

## 2025-03-27 RX ORDER — CLOPIDOGREL BISULFATE 75 MG/1
1 TABLET, FILM COATED ORAL
Qty: 0 | Refills: 0 | DISCHARGE
Start: 2025-03-27

## 2025-03-27 RX ORDER — INSULIN LISPRO 100 U/ML
9 INJECTION, SOLUTION INTRAVENOUS; SUBCUTANEOUS
Qty: 1 | Refills: 0
Start: 2025-03-27

## 2025-03-27 RX ORDER — ASPIRIN 325 MG
1 TABLET ORAL
Qty: 0 | Refills: 0 | DISCHARGE
Start: 2025-03-27

## 2025-03-27 RX ORDER — SACUBITRIL AND VALSARTAN 6; 6 MG/1; MG/1
1 PELLET ORAL
Qty: 0 | Refills: 0 | DISCHARGE
Start: 2025-03-27

## 2025-03-27 RX ADMIN — Medication 975 MILLIGRAM(S): at 12:47

## 2025-03-27 RX ADMIN — CEFTRIAXONE 100 MILLIGRAM(S): 500 INJECTION, POWDER, FOR SOLUTION INTRAMUSCULAR; INTRAVENOUS at 15:46

## 2025-03-27 RX ADMIN — SODIUM ZIRCONIUM CYCLOSILICATE 10 GRAM(S): 5 POWDER, FOR SUSPENSION ORAL at 12:10

## 2025-03-27 RX ADMIN — Medication 1 TABLET(S): at 12:09

## 2025-03-27 RX ADMIN — METHOCARBAMOL 750 MILLIGRAM(S): 500 TABLET, FILM COATED ORAL at 12:09

## 2025-03-27 RX ADMIN — INSULIN LISPRO 9 UNIT(S): 100 INJECTION, SOLUTION INTRAVENOUS; SUBCUTANEOUS at 12:43

## 2025-03-27 RX ADMIN — METHOCARBAMOL 750 MILLIGRAM(S): 500 TABLET, FILM COATED ORAL at 05:09

## 2025-03-27 RX ADMIN — Medication 5 MILLIGRAM(S): at 05:09

## 2025-03-27 RX ADMIN — GABAPENTIN 300 MILLIGRAM(S): 400 CAPSULE ORAL at 15:46

## 2025-03-27 RX ADMIN — GABAPENTIN 300 MILLIGRAM(S): 400 CAPSULE ORAL at 05:13

## 2025-03-27 RX ADMIN — METHOCARBAMOL 750 MILLIGRAM(S): 500 TABLET, FILM COATED ORAL at 00:25

## 2025-03-27 RX ADMIN — Medication 40 MILLIGRAM(S): at 12:10

## 2025-03-27 RX ADMIN — Medication 975 MILLIGRAM(S): at 05:09

## 2025-03-27 RX ADMIN — HEPARIN SODIUM 5000 UNIT(S): 1000 INJECTION INTRAVENOUS; SUBCUTANEOUS at 05:09

## 2025-03-27 RX ADMIN — INSULIN LISPRO 9 UNIT(S): 100 INJECTION, SOLUTION INTRAVENOUS; SUBCUTANEOUS at 09:10

## 2025-03-27 RX ADMIN — Medication 81 MILLIGRAM(S): at 12:09

## 2025-03-27 RX ADMIN — Medication 975 MILLIGRAM(S): at 13:47

## 2025-03-27 RX ADMIN — CLOPIDOGREL BISULFATE 75 MILLIGRAM(S): 75 TABLET, FILM COATED ORAL at 12:09

## 2025-03-27 RX ADMIN — Medication 0.5 MILLIGRAM(S): at 14:13

## 2025-03-27 NOTE — PROGRESS NOTE ADULT - PROBLEM SELECTOR PROBLEM 3
Hyperlipidemia
Pleural effusion
Hypertension
Peripheral artery disease
Hyperlipidemia
Hypertension
Peripheral artery disease
Pleural effusion
Hypertension
Peripheral artery disease
Hyperlipidemia
Hypertension
Hypertension
Pleural effusion
Peripheral artery disease
Peripheral artery disease
Hyperlipidemia
Pleural effusion
Hypertension
Pleural effusion
Peripheral artery disease
Peripheral artery disease
Pleural effusion
Hypertension
Hyperlipidemia
Hypertension
Peripheral artery disease
Pleural effusion
Hyperlipidemia
Hypertension
Pleural effusion
Hypertension
Hypertension
Peripheral artery disease
Pleural effusion
Pleural effusion
Peripheral artery disease
Pleural effusion

## 2025-03-27 NOTE — PROGRESS NOTE ADULT - SUBJECTIVE AND OBJECTIVE BOX
Name of Patient : TOMASZ ALBA  MRN: [id number]  Date of visit: 03-27-25 @ 15:17      Subjective: Patient seen and examined. No new events except as noted.   Patient seen earlier this AM. Lying down in bed. Family at the bedside.   Reports her pain is currently controlled.     REVIEW OF SYSTEMS:    CONSTITUTIONAL: Generalized weakness, No fevers or chills  EYES/ENT: No visual changes;  No vertigo or throat pain   NECK: No pain or stiffness  RESPIRATORY: No cough, wheezing, hemoptysis; No shortness of breath  CARDIOVASCULAR: No chest pain or palpitations  GASTROINTESTINAL: No abdominal or epigastric pain. No nausea, vomiting, or hematemesis; No diarrhea or constipation. No melena or hematochezia.  GENITOURINARY: No dysuria, frequency or hematuria  NEUROLOGICAL: No numbness or weakness  SKIN/MSK: Pain controlled S/P Rt AKA   All other review of systems is negative unless indicated above.    MEDICATIONS:  MEDICATIONS  (STANDING):  acetaminophen     Tablet .. 975 milliGRAM(s) Oral every 8 hours  aspirin enteric coated 81 milliGRAM(s) Oral daily  atorvastatin 40 milliGRAM(s) Oral at bedtime  bisacodyl 5 milliGRAM(s) Oral every 12 hours  cefTRIAXone   IVPB 1000 milliGRAM(s) IV Intermittent every 24 hours  chlorhexidine 4% Liquid 1 Application(s) Topical <User Schedule>  clopidogrel Tablet 75 milliGRAM(s) Oral daily  dextrose 5%. 1000 milliLiter(s) (100 mL/Hr) IV Continuous <Continuous>  dextrose 5%. 1000 milliLiter(s) (50 mL/Hr) IV Continuous <Continuous>  dextrose 50% Injectable 25 Gram(s) IV Push once  dextrose 50% Injectable 12.5 Gram(s) IV Push once  dextrose 50% Injectable 25 Gram(s) IV Push once  gabapentin 300 milliGRAM(s) Oral every 8 hours  glucagon  Injectable 1 milliGRAM(s) IntraMuscular once  heparin   Injectable 5000 Unit(s) SubCutaneous every 12 hours  insulin glargine Injectable (LANTUS) 16 Unit(s) SubCutaneous at bedtime  insulin lispro (ADMELOG) corrective regimen sliding scale   SubCutaneous three times a day before meals  insulin lispro (ADMELOG) corrective regimen sliding scale   SubCutaneous at bedtime  insulin lispro Injectable (ADMELOG) 9 Unit(s) SubCutaneous three times a day with meals  methocarbamol 750 milliGRAM(s) Oral every 6 hours  metoprolol succinate ER 25 milliGRAM(s) Oral daily  Nephro-rober 1 Tablet(s) Oral daily  pantoprazole  Injectable 40 milliGRAM(s) IV Push daily  polyethylene glycol 3350 17 Gram(s) Oral daily  sacubitril 24 mG/valsartan 26 mG 1 Tablet(s) Oral two times a day  senna 2 Tablet(s) Oral at bedtime  sodium zirconium cyclosilicate 10 Gram(s) Oral daily      PHYSICAL EXAM:  T(C): 36.7 (03-27-25 @ 11:46), Max: 36.8 (03-26-25 @ 17:05)  HR: 99 (03-27-25 @ 14:14) (88 - 99)  BP: 96/60 (03-27-25 @ 14:14) (87/50 - 96/60)  RR: 18 (03-27-25 @ 14:14) (18 - 18)  SpO2: 95% (03-27-25 @ 14:14) (91% - 97%)  Wt(kg): --  I&O's Summary    26 Mar 2025 07:01  -  27 Mar 2025 07:00  --------------------------------------------------------  IN: 0 mL / OUT: 1650 mL / NET: -1650 mL    27 Mar 2025 07:01  -  27 Mar 2025 15:17  --------------------------------------------------------  IN: 650 mL / OUT: 400 mL / NET: 250 mL      Appearance: Awake, sitting up in bed 	  HEENT:  Eyes are open   Lymphatic: No lymphadenopathy grossly   Cardiovascular: Normal   Respiratory: normal effort , clear  Gastrointestinal:  Soft, Non-tender  Skin: No rashes,  warm to touch  Psychiatry:  Mood & affect appropriate  Musculoskeletal: SP Rt AKA               03-26-25 @ 07:01  -  03-27-25 @ 07:00  --------------------------------------------------------  IN: 0 mL / OUT: 1650 mL / NET: -1650 mL    03-27-25 @ 07:01  -  03-27-25 @ 15:17  --------------------------------------------------------  IN: 650 mL / OUT: 400 mL / NET: 250 mL                                9.1    10.25 )-----------( 283      ( 27 Mar 2025 05:48 )             26.2               03-27    133[L]  |  97  |  38[H]  ----------------------------<  93  4.6   |  22  |  1.27    Ca    8.2[L]      27 Mar 2025 05:49  Mg     1.8     03-27                         Urinalysis Basic - ( 27 Mar 2025 05:49 )    Color: x / Appearance: x / SG: x / pH: x  Gluc: 93 mg/dL / Ketone: x  / Bili: x / Urobili: x   Blood: x / Protein: x / Nitrite: x   Leuk Esterase: x / RBC: x / WBC x   Sq Epi: x / Non Sq Epi: x / Bacteria: x

## 2025-03-27 NOTE — PROGRESS NOTE ADULT - TIME BILLING
- Review of records, telemetry, vital signs and daily labs.   - General and cardiovascular physical examination.  - Generation of cardiovascular treatment plan and completion of note .  - Coordination of care.      Patient was seen and examined by me  02/03/2025 on ,interim events noted,labs and radiology studies reviewed.  Yordy Gilmore MD,FACC.  [address] [place], [place] [id number].  [phone]  Availabe to call or text on Microsoft TEAMS.
- Review of records, telemetry, vital signs and daily labs.   - General and cardiovascular physical examination.  - Generation of cardiovascular treatment plan and completion of note .  - Coordination of care.      Patient was seen and examined by me  02/04/2025 on ,interim events noted,labs and radiology studies reviewed.  Yordy Gilmore MD,FACC.  [address] [place], [place] [id number].  [phone]  Availabe to call or text on Microsoft TEAMS.
- Review of records, telemetry, vital signs and daily labs.   - General and cardiovascular physical examination.  - Generation of cardiovascular treatment plan and completion of note .  - Coordination of care.      Patient was seen and examined by me on  02/21/2025 ,interim events noted,labs and radiology studies reviewed.  Yordy Gilmore MD,FACC.  [address] [place], [place] [id number].  [phone]  Availabe to call or text on Microsoft TEAMS.
- Review of records, telemetry, vital signs and daily labs.   - General and cardiovascular physical examination.  - Generation of cardiovascular treatment plan and completion of note .  - Coordination of care.      Patient was seen and examined by me on  02/26/2025 ,interim events noted,labs and radiology studies reviewed.  Yordy Gilmore MD,FACC.  2499 [facility]49801.  [phone]  Availabe to call or text on Microsoft TEAMS.
- Review of records, telemetry, vital signs and daily labs.   - General and cardiovascular physical examination.  - Generation of cardiovascular treatment plan and completion of note .  - Coordination of care.      Patient was seen and examined by me on  03/18/2025 ,interim events noted,labs and radiology studies reviewed.  Yordy Gilmore MD,FACC.  [address] [place], [place] [id number].  [phone]  Availabe to call or text on Microsoft TEAMS.
- Review of records, telemetry, vital signs and daily labs.   - General and cardiovascular physical examination.  - Generation of cardiovascular treatment plan and completion of note .  - Coordination of care.      Patient was seen and examined by me on 02/12/2025,interim events noted,labs and radiology studies reviewed.  Yordy Gilmore MD,FACC.  [address] [place], [place] [id number].  [phone]  Availabe to call or text on Microsoft TEAMS.
- Review of records, telemetry, vital signs and daily labs.   - General and cardiovascular physical examination.  - Generation of cardiovascular treatment plan and completion of note .  - Coordination of care.      Patient was seen and examined by me on 02/22/2025 ,interim events noted,labs and radiology studies reviewed.  Yordy Gilmore MD,FACC.  5199 [facility]91012.  [phone]  Availabe to call or text on Microsoft TEAMS.
- Review of records, telemetry, vital signs and daily labs.   - General and cardiovascular physical examination.  - Generation of cardiovascular treatment plan and completion of note .  - Coordination of care.      Patient was seen and examined by me on 03/07/2025,interim events noted,labs and radiology studies reviewed.  Yordy Gilmore MD,FACC.  [address] [place], [place] [id number].  [phone]  Availabe to call or text on Microsoft TEAMS.
- Review of records, telemetry, vital signs and daily labs.   - General and cardiovascular physical examination.  - Generation of cardiovascular treatment plan and completion of note .  - Coordination of care.      Patient was seen and examined by me on 03/23/2025 ,interim events noted,labs and radiology studies reviewed.  Yordy Gilmore MD,FACC.  2437 [facility]51065.  [phone]  Availabe to call or text on Microsoft TEAMS.
- Review of records, telemetry, vital signs and daily labs.   - General and cardiovascular physical examination.  - Generation of cardiovascular treatment plan and completion of note .  - Coordination of care.      Patient was seen and examined by me on03/19/2025 ,interim events noted,labs and radiology studies reviewed.  Yordy Gilmore MD,FACC.  [address] [place], [place] [id number].  [phone]  Availabe to call or text on Microsoft TEAMS.
- Review of records, telemetry, vital signs and daily labs.   - General and cardiovascular physical examination.  - Generation of cardiovascular treatment plan and completion of note .  - Coordination of care.      Patient was seen and examined by me  02/01/2025 on ,interim events noted,labs and radiology studies reviewed.  Yordy Gilmore MD,FACC.  [address] [place], [place] [id number].  [phone]  Availabe to call or text on Microsoft TEAMS.
- Review of records, telemetry, vital signs and daily labs.   - General and cardiovascular physical examination.  - Generation of cardiovascular treatment plan and completion of note .  - Coordination of care.      Patient was seen and examined by me  02/02/2025 on ,interim events noted,labs and radiology studies reviewed.  Yordy Gilmore MD,FACC.  [address] [place], [place] [id number].  [phone]  Availabe to call or text on Microsoft TEAMS.
- Review of records, telemetry, vital signs and daily labs.   - General and cardiovascular physical examination.  - Generation of cardiovascular treatment plan and completion of note .  - Coordination of care.      Patient was seen and examined by me on  02/11/2025 ,interim events noted,labs and radiology studies reviewed.  Yordy Gilmore MD,FACC.  [address] [place], [place] [id number].  [phone]  Availabe to call or text on Microsoft TEAMS.
- Review of records, telemetry, vital signs and daily labs.   - General and cardiovascular physical examination.  - Generation of cardiovascular treatment plan and completion of note .  - Coordination of care.      Patient was seen and examined by me on  02/27/2025 ,interim events noted,labs and radiology studies reviewed.  Yordy Gilmore MD,FACC.  0807 [facility]96775.  [phone]  Availabe to call or text on Microsoft TEAMS.
- Review of records, telemetry, vital signs and daily labs.   - General and cardiovascular physical examination.  - Generation of cardiovascular treatment plan and completion of note .  - Coordination of care.      Patient was seen and examined by me on  03/06/2025,interim events noted,labs and radiology studies reviewed.  Yordy Gilmore MD,FACC.  [address] [place], [place] [id number].  [phone]  Availabe to call or text on Microsoft TEAMS.
- Review of records, telemetry, vital signs and daily labs.   - General and cardiovascular physical examination.  - Generation of cardiovascular treatment plan and completion of note .  - Coordination of care.      Patient was seen and examined by me on 02/15/2025 ,interim events noted,labs and radiology studies reviewed.  Yordy Gilmore MD,FACC.  [address] [place], [place] [id number].  [phone]  Availabe to call or text on Microsoft TEAMS.
- Review of records, telemetry, vital signs and daily labs.   - General and cardiovascular physical examination.  - Generation of cardiovascular treatment plan and completion of note .  - Coordination of care.      Patient was seen and examined by me on 03/24/2025,interim events noted,labs and radiology studies reviewed.  Yordy Gilmore MD,FACC.  [address] [place], [place] [id number].  [phone]  Availabe to call or text on Microsoft TEAMS.
- Review of records, telemetry, vital signs and daily labs.   - General and cardiovascular physical examination.  - Generation of cardiovascular treatment plan and completion of note .  - Coordination of care.      Patient was seen and examined by me on  02/28/2025 ,interim events noted,labs and radiology studies reviewed.  Yordy Gilmore MD,FACC.  [address] [place], [place] [id number].  [phone]  Availabe to call or text on Microsoft TEAMS.
- Review of records, telemetry, vital signs and daily labs.   - General and cardiovascular physical examination.  - Generation of cardiovascular treatment plan and completion of note .  - Coordination of care.      Patient was seen and examined by me on  03/20/2025 ,interim events noted,labs and radiology studies reviewed.  Yordy Gilmore MD,FACC.  [address] [place], [place] [id number].  [phone]  Availabe to call or text on Microsoft TEAMS.
- Review of records, telemetry, vital signs and daily labs.   - General and cardiovascular physical examination.  - Generation of cardiovascular treatment plan and completion of note .  - Coordination of care.      Patient was seen and examined by me on  03/26/2025 ,interim events noted,labs and radiology studies reviewed.  Yordy Gilmore MD,FACC.  5888 [facility]72051.  [phone]  Availabe to call or text on Microsoft TEAMS.
- Review of records, telemetry, vital signs and daily labs.   - General and cardiovascular physical examination.  - Generation of cardiovascular treatment plan and completion of note .  - Coordination of care.      Patient was seen and examined by me on 02/18/2025,interim events noted,labs and radiology studies reviewed.  Yordy Gilmore MD,FACC.  [address] [place], [place] [id number].  [phone]  Availabe to call or text on Microsoft TEAMS.
- Review of records, telemetry, vital signs and daily labs.   - General and cardiovascular physical examination.  - Generation of cardiovascular treatment plan and completion of note .  - Coordination of care.      Patient was seen and examined by me on 03/04/2025,interim events noted,labs and radiology studies reviewed.  Yordy Gilmore MD,FACC.  [address] [place], [place] [id number].  [phone]  Availabe to call or text on Microsoft TEAMS.
- Review of records, telemetry, vital signs and daily labs.   - General and cardiovascular physical examination.  - Generation of cardiovascular treatment plan and completion of note .  - Coordination of care.      Patient was seen and examined by me on  02/08/2025 ,interim events noted,labs and radiology studies reviewed.  Yordy Gilmore MD,FACC.  [address] [place], [place] [id number].  [phone]  Availabe to call or text on Microsoft TEAMS.
- Review of records, telemetry, vital signs and daily labs.   - General and cardiovascular physical examination.  - Generation of cardiovascular treatment plan and completion of note .  - Coordination of care.      Patient was seen and examined by me on  02/10/2025 ,interim events noted,labs and radiology studies reviewed.  Yordy Gilmore MD,FACC.  2316 [facility]22065.  [phone]  Availabe to call or text on Microsoft TEAMS.
- Review of records, telemetry, vital signs and daily labs.   - General and cardiovascular physical examination.  - Generation of cardiovascular treatment plan and completion of note .  - Coordination of care.      Patient was seen and examined by me on  02/20/2025 ,interim events noted,labs and radiology studies reviewed.  Yordy Gilmore MD,FACC.  [address] [place], [place] [id number].  [phone]  Availabe to call or text on Microsoft TEAMS.
- Review of records, telemetry, vital signs and daily labs.   - General and cardiovascular physical examination.  - Generation of cardiovascular treatment plan and completion of note .  - Coordination of care.      Patient was seen and examined by me on  02/25/2025 ,interim events noted,labs and radiology studies reviewed.  Yordy Gilmore MD,FACC.  3743 [facility]43230.  [phone]  Availabe to call or text on Microsoft TEAMS.
- Review of records, telemetry, vital signs and daily labs.   - General and cardiovascular physical examination.  - Generation of cardiovascular treatment plan and completion of note .  - Coordination of care.      Patient was seen and examined by me on  03/01/2025 ,interim events noted,labs and radiology studies reviewed.  Yordy Gilmore MD,FACC.  [address] [place], [place] [id number].  [phone]  Availabe to call or text on Microsoft TEAMS.
- Review of records, telemetry, vital signs and daily labs.   - General and cardiovascular physical examination.  - Generation of cardiovascular treatment plan and completion of note .  - Coordination of care.      Patient was seen and examined by me on  03/08/2025 ,interim events noted,labs and radiology studies reviewed.  Yordy Gilmore MD,FACC.  [address] [place], [place] [id number].  [phone]  Availabe to call or text on Microsoft TEAMS.
- Review of records, telemetry, vital signs and daily labs.   - General and cardiovascular physical examination.  - Generation of cardiovascular treatment plan and completion of note .  - Coordination of care.      Patient was seen and examined by me on 01/31/2025 ,interim events noted,labs and radiology studies reviewed.  Yordy Gilmore MD,FACC.  [address] [place], [place] [id number].  [phone]  Availabe to call or text on Microsoft TEAMS.
- Review of records, telemetry, vital signs and daily labs.   - General and cardiovascular physical examination.  - Generation of cardiovascular treatment plan and completion of note .  - Coordination of care.      Patient was seen and examined by me on 02/14/2025,interim events noted,labs and radiology studies reviewed.  Yordy Gilmore MD,FACC.  [address] [place], [place] [id number].  [phone]  Availabe to call or text on Microsoft TEAMS.
- Review of records, telemetry, vital signs and daily labs.   - General and cardiovascular physical examination.  - Generation of cardiovascular treatment plan and completion of note .  - Coordination of care.      Patient was seen and examined by me on 03/05/2025,interim events noted,labs and radiology studies reviewed.  Yordy Gilmore MD,FACC.  [address] [place], [place] [id number].  [phone]  Availabe to call or text on Microsoft TEAMS.
- Review of records, telemetry, vital signs and daily labs.   - General and cardiovascular physical examination.  - Generation of cardiovascular treatment plan and completion of note .  - Coordination of care.      Patient was seen and examined by me  02/06/2025 on ,interim events noted,labs and radiology studies reviewed.  Yordy Gilmore MD,FACC.  [address] [place], [place] [id number].  [phone]  Availabe to call or text on Microsoft TEAMS.
- Review of records, telemetry, vital signs and daily labs.   - General and cardiovascular physical examination.  - Generation of cardiovascular treatment plan and completion of note .  - Coordination of care.      Patient was seen and examined by me on   03/09/2025,interim events noted,labs and radiology studies reviewed.  Yordy Gilmore MD,FACC.  [address] [place], [place] [id number].  [phone]  Availabe to call or text on Microsoft TEAMS.
- Review of records, telemetry, vital signs and daily labs.   - General and cardiovascular physical examination.  - Generation of cardiovascular treatment plan and completion of note .  - Coordination of care.      Patient was seen and examined by me on  02/19/2025 ,interim events noted,labs and radiology studies reviewed.  Yordy Gilmore MD,FACC.  [address] [place], [place] [id number].  [phone]  Availabe to call or text on Microsoft TEAMS.
- Review of records, telemetry, vital signs and daily labs.   - General and cardiovascular physical examination.  - Generation of cardiovascular treatment plan and completion of note .  - Coordination of care.      Patient was seen and examined by me on  02/23/2025 ,interim events noted,labs and radiology studies reviewed.  Yordy Gilmore MD,FACC.  7349 [facility]65238.  [phone]  Availabe to call or text on Microsoft TEAMS.
- Review of records, telemetry, vital signs and daily labs.   - General and cardiovascular physical examination.  - Generation of cardiovascular treatment plan and completion of note .  - Coordination of care.      Patient was seen and examined by me on  03/03/2025 ,interim events noted,labs and radiology studies reviewed.  Yordy Gilmore MD,FACC.  [address] [place], [place] [id number].  [phone]  Availabe to call or text on Microsoft TEAMS.
- Review of records, telemetry, vital signs and daily labs.   - General and cardiovascular physical examination.  - Generation of cardiovascular treatment plan and completion of note .  - Coordination of care.      Patient was seen and examined by me on  03/10/2024 ,interim events noted,labs and radiology studies reviewed.  Yordy Gilmore MD,FACC.  [address] [place], [place] [id number].  [phone]  Availabe to call or text on Microsoft TEAMS.
- Review of records, telemetry, vital signs and daily labs.   - General and cardiovascular physical examination.  - Generation of cardiovascular treatment plan and completion of note .  - Coordination of care.      Patient was seen and examined by me on  03/13/2025 ,interim events noted,labs and radiology studies reviewed.  Yordy Gilmore MD,FACC.  [address] [place], [place] [id number].  [phone]  Availabe to call or text on Microsoft TEAMS.
- Review of records, telemetry, vital signs and daily labs.   - General and cardiovascular physical examination.  - Generation of cardiovascular treatment plan and completion of note .  - Coordination of care.      Patient was seen and examined by me on  03/14/2025 ,interim events noted,labs and radiology studies reviewed.  Yordy Gilmore MD,FACC.  [address] [place], [place] [id number].  [phone]  Availabe to call or text on Microsoft TEAMS.
- Review of records, telemetry, vital signs and daily labs.   - General and cardiovascular physical examination.  - Generation of cardiovascular treatment plan and completion of note .  - Coordination of care.      Patient was seen and examined by me on  03/22/2025 ,interim events noted,labs and radiology studies reviewed.  Yordy Gilmore MD,FACC.  4120 [facility]32294.  [phone]  Availabe to call or text on Microsoft TEAMS.
- Review of records, telemetry, vital signs and daily labs.   - General and cardiovascular physical examination.  - Generation of cardiovascular treatment plan and completion of note .  - Coordination of care.      Patient was seen and examined by me on 02/24/2025 ,interim events noted,labs and radiology studies reviewed.  Yordy Gilmore MD,FACC.  [address] [place], [place] [id number].  [phone]  Availabe to call or text on Microsoft TEAMS.
- Review of records, telemetry, vital signs and daily labs.   - General and cardiovascular physical examination.  - Generation of cardiovascular treatment plan and completion of note .  - Coordination of care.      Patient was seen and examined by me on 03/12/2025 ,interim events noted,labs and radiology studies reviewed.  Yordy Gilmore MD,FACC.  [address] [place], [place] [id number].  [phone]  Availabe to call or text on Microsoft TEAMS.
- Review of records, telemetry, vital signs and daily labs.   - General and cardiovascular physical examination.  - Generation of cardiovascular treatment plan and completion of note .  - Coordination of care.      Patient was seen and examined by me on 03/25/2025,interim events noted,labs and radiology studies reviewed.  Yordy Gilmore MD,FACC.  9518 [facility]10040.  [phone]  Availabe to call or text on Microsoft TEAMS.
- Review of records, telemetry, vital signs and daily labs.   - General and cardiovascular physical examination.  - Generation of cardiovascular treatment plan and completion of note .  - Coordination of care.      Patient was seen and examined by me  02/05/2025 on ,interim events noted,labs and radiology studies reviewed.  Yordy Gilmore MD,FACC.  [address] [place], [place] [id number].  [phone]  Availabe to call or text on Microsoft TEAMS.
- Review of records, telemetry, vital signs and daily labs.   - General and cardiovascular physical examination.  - Generation of cardiovascular treatment plan and completion of note .  - Coordination of care.      Patient was seen and examined by me on  02/16/2025 ,interim events noted,labs and radiology studies reviewed.  Yordy Gilmore MD,FACC.  [address] [place], [place] [id number].  [phone]  Availabe to call or text on Microsoft TEAMS.
- Review of records, telemetry, vital signs and daily labs.   - General and cardiovascular physical examination.  - Generation of cardiovascular treatment plan and completion of note .  - Coordination of care.      Patient was seen and examined by me on  03/21/2025 ,interim events noted,labs and radiology studies reviewed.  Yordy Gilmore MD,FACC.  [address] [place], [place] [id number].  [phone]  Availabe to call or text on Microsoft TEAMS.
- Review of records, telemetry, vital signs and daily labs.   - General and cardiovascular physical examination.  - Generation of cardiovascular treatment plan and completion of note .  - Coordination of care.      Patient was seen and examined by me on  03/27/2025 ,interim events noted,labs and radiology studies reviewed.  Yordy Gilmore MD,FACC.  8663 [facility]32545.  [phone]  Availabe to call or text on Microsoft TEAMS.
- Review of records, telemetry, vital signs and daily labs.   - General and cardiovascular physical examination.  - Generation of cardiovascular treatment plan and completion of note .  - Coordination of care.      Patient was seen and examined by me on 03/11./2025,interim events noted,labs and radiology studies reviewed.  Yordy Gilmore MD,FACC.  [address] [place], [place] [id number].  [phone]  Availabe to call or text on Microsoft TEAMS.
- Review of records, telemetry, vital signs and daily labs.   - General and cardiovascular physical examination.  - Generation of cardiovascular treatment plan and completion of note .  - Coordination of care.      Patient was seen and examined by me on  02/07/2025 ,interim events noted,labs and radiology studies reviewed.  Yordy Gilmore MD,FACC.  [address] [place], [place] [id number].  [phone]  Availabe to call or text on Microsoft TEAMS.
- Review of records, telemetry, vital signs and daily labs.   - General and cardiovascular physical examination.  - Generation of cardiovascular treatment plan and completion of note .  - Coordination of care.      Patient was seen and examined by me on  02/17/2025 ,interim events noted,labs and radiology studies reviewed.  Yordy Gilmore MD,FACC.  [address] [place], [place] [id number].  [phone]  Availabe to call or text on Microsoft TEAMS.
- Review of records, telemetry, vital signs and daily labs.   - General and cardiovascular physical examination.  - Generation of cardiovascular treatment plan and completion of note .  - Coordination of care.      Patient was seen and examined by me on  03/15/2025 ,interim events noted,labs and radiology studies reviewed.  Yordy Gilmore MD,FACC.  [address] [place], [place] [id number].  [phone]  Availabe to call or text on Microsoft TEAMS.
- Review of records, telemetry, vital signs and daily labs.   - General and cardiovascular physical examination.  - Generation of cardiovascular treatment plan and completion of note .  - Coordination of care.      Patient was seen and examined by me on  03/16/2025 ,interim events noted,labs and radiology studies reviewed.  Yordy Gilmore MD,FACC.  [address] [place], [place] [id number].  [phone]  Availabe to call or text on Microsoft TEAMS.
- Review of records, telemetry, vital signs and daily labs.   - General and cardiovascular physical examination.  - Generation of cardiovascular treatment plan and completion of note .  - Coordination of care.      Patient was seen and examined by me on  03/17/2025 ,interim events noted,labs and radiology studies reviewed.  Yordy Gilmore MD,FACC.  [address] [place], [place] [id number].  [phone]  Availabe to call or text on [employer] TEAMS.
- Review of records, telemetry, vital signs and daily labs.   - General and cardiovascular physical examination.  - Generation of cardiovascular treatment plan and completion of note .  - Coordination of care.      Patient was seen and examined by me on 02/13/2025 ,interim events noted,labs and radiology studies reviewed.  Yordy Gilmore MD,FACC.  [address] [place], [place] [id number].  [phone]  Availabe to call or text on [employer] TEAMS.
- Review of records, telemetry, vital signs and daily labs.   - General and cardiovascular physical examination.  - Generation of cardiovascular treatment plan and completion of note .  - Coordination of care.      Patient was seen and examined by me on 03/02/2025,interim events noted,labs and radiology studies reviewed.  Yordy Gilmore MD,FACC.  [address] [place], [place] [id number].  [phone]  Availabe to call or text on [employer] TEAMS.
- Review of chart and consultation notes  - Exam and discussion with patient  - Review of laboratory and imaging   - Establishing a care plan for patient  - Communication with other providers

## 2025-03-27 NOTE — PROGRESS NOTE ADULT - PROBLEM SELECTOR PLAN 5
-S/p R AKA 3/17  -Management per vascular.
-S/p angiogram  -Management per vascular
-S/p angiogram  -Management per vascular
-S/p R AKA 3/17  -Management per vascular.
-S/p angiogram  -Management per vascular
-Per vascular  -Plan for angiogram.
-Per vascular  -Plan for eventual angiogram
-S/p R AKA 3/17  -Management per vascular.
-S/p angiogram  -Management per vascular
-S/p R AKA 3/17  -Management per vascular.
-S/p angiogram  -Management per vascular
-S/p angiogram  -Management per vascular
-Per vascular  -Plan for angiogram once creatinine improves
-S/p R AKA 3/17  -Management per vascular.
-Per vascular  -Plan for angiogram once creatinine improves
-S/p R AKA 3/17  -Management per vascular.
-S/p angiogram  -Management per vascular
-S/p angiogram  -Management per vascular
-S/p R AKA 3/17  -Management per vascular.

## 2025-03-27 NOTE — PROGRESS NOTE ADULT - SUBJECTIVE AND OBJECTIVE BOX
MR#[id number]  PATIENT NAME:COLE ALBA    DATE OF SERVICE: 03-27-25 @ 08:04  Patient was seen and examined by Yordy Gilmore MD on    03-27-25 @ 08:04 .  Interim events noted.Consultant notes ,Labs,Telemetry reviewed by me       HOSPITAL COURSE: HPI:  64F, hx of CHF (last TTE on 1/16/25 EF 30-35%, G2DD), DM, diabetic foot ulcer with a recent admission at [facility] for CHF exacerbation presented as a transfer for worsening R. leg pain and fluid secretion, On non-invasive imaging demonstrating severe depletion of RLE blood flow beyond the popliteal vessel at knee and similar findings in L. Was transferred to [facility] for CO2 angiogram with Dr. Baltazar. (29 Jan 2025 20:05)      INTERIM EVENTS:Patient seen at bedside ,interim events noted.  03/05-Awake s/p LHC VERA x 1 to LAD, VERA to LM , POBA to CX via RFA  03/18-POD#1 Right AKA  03/22-Awake alert no dyspnea - LLE arterial US results:   Moderate stenosis of the superficial femoral artery in the mid thigh.  High-grade stenosis of the anterior tibial artery in the upper calf.  Segmental occlusion of the mid and distal posterior tibial artery.  03/23-Awake calert pain controlled Sinus Rhythm Good UOP now has Montgomery  03/24-Awake not orthopneac good UOP Awaiting Vascular follow up-Left LE-for Permacath removed  03/25-AWake alert Sinus Rhythm awaiting Vascular  03/26-Awake alert pain controlled not orthopneac UOP- 2251 mL  03/27 No further fever no dyspnea UOP-1650mL-Sinus Rhythm    PMH -reviewed admission note, no change since admission  HEART FAILURE: Acute[ x]Chronic[ ] Systolic[x ] Diastolic[ ] Combined Systolic and Diastolic[ ]  CAD[x ] CABG[ ] PCI[x ]  DEVICES[ ] PPM[ ] ICD[ ] ILR[ ]  ATRIAL FIBRILLATION[ ] Paroxysmal[ ] Permanent[ ] CHADS2-[  ]  JAMEL[ ] CKD1[ ] CKD2[ ] CKD3[ ] CKD4[ ] ESRD[ ]  COPD[ ] HTN[x ]   DM[x ] Type1[ ] Type 2[x ]   CVA[ ] Paresis[ ]    AMBULATION: Assisted[x ] Cane/walker[ ] Independent[ ]        MEDICATIONS  (STANDING):  acetaminophen     Tablet .. 975 milliGRAM(s) Oral every 8 hours  aspirin enteric coated 81 milliGRAM(s) Oral daily  atorvastatin 40 milliGRAM(s) Oral at bedtime  bisacodyl 5 milliGRAM(s) Oral every 12 hours  chlorhexidine 4% Liquid 1 Application(s) Topical <User Schedule>  clopidogrel Tablet 75 milliGRAM(s) Oral daily  gabapentin 300 milliGRAM(s) Oral every 8 hours  glucagon  Injectable 1 milliGRAM(s) IntraMuscular once  heparin   Injectable 5000 Unit(s) SubCutaneous every 12 hours  insulin glargine Injectable (LANTUS) 17 Unit(s) SubCutaneous at bedtime  insulin lispro (ADMELOG) corrective regimen sliding scale   SubCutaneous three times a day before meals  insulin lispro (ADMELOG) corrective regimen sliding scale   SubCutaneous at bedtime  insulin lispro Injectable (ADMELOG) 9 Unit(s) SubCutaneous three times a day with meals  methocarbamol 750 milliGRAM(s) Oral every 6 hours  metoprolol succinate ER 25 milliGRAM(s) Oral daily  Nephro-rober 1 Tablet(s) Oral daily  pantoprazole  Injectable 40 milliGRAM(s) IV Push daily  polyethylene glycol 3350 17 Gram(s) Oral daily  sacubitril 24 mG/valsartan 26 mG 1 Tablet(s) Oral two times a day  senna 2 Tablet(s) Oral at bedtime  sodium zirconium cyclosilicate 10 Gram(s) Oral daily    MEDICATIONS  (PRN):  dextrose Oral Gel 15 Gram(s) Oral once PRN Blood Glucose LESS THAN 70 milliGRAM(s)/deciliter  melatonin 3 milliGRAM(s) Oral at bedtime PRN Insomnia  ondansetron Injectable 4 milliGRAM(s) IV Push every 6 hours PRN Nausea and/or Vomiting  sodium chloride 0.65% Nasal 1 Spray(s) Both Nostrils three times a day PRN Dryness  sodium chloride 0.9% lock flush 10 milliLiter(s) IV Push every 1 hour PRN Pre/post blood products, medications, blood draw, and to maintain line patency            REVIEW OF SYSTEMS:  Constitutional: [ ] fever, [ ]weight loss,  [x ]fatigue [ ]weight gain  Eyes: [ ] visual changes  Respiratory: [ ]shortness of breath;  [ ] cough, [ ]wheezing, [ ]chills, [ ]hemoptysis  Cardiovascular: [ ] chest pain, [ ]palpitations, [ ]dizziness,  [ ]leg swelling[ ]orthopnea[ ]PND  Gastrointestinal: [ ] abdominal pain, [ ]nausea, [ ]vomiting,  [ ]diarrhea [ ]Constipation [ ]Melena  Genitourinary: [ ] dysuria, [ ] hematuria [ ]Montgomery  Neurologic: [ ] headaches [ ] tremors[ ]weakness [ ]Paralysis Right[ ] Left[ ]  Skin: [ ] itching, [ ]burning, [ ] rashes  Endocrine: [ ] heat or cold intolerance  Musculoskeletal: [ ] joint pain or swelling; [ ] muscle, back, or extremity pain  Psychiatric: [ ] depression, [ ]anxiety, [ ]mood swings, or [ ]difficulty sleeping  Hematologic: [ ] easy bruising, [ ] bleeding gums    [ ] All remaining systems negative except as per above.   [ ]Unable to obtain.  [x] No change in ROS since admission      Vital Signs Last 24 Hrs  T(C): 36.7 (27 Mar 2025 04:25), Max: 37.7 (26 Mar 2025 08:37)  T(F): 98.1 (27 Mar 2025 04:25), Max: 99.8 (26 Mar 2025 08:37)  HR: 88 (27 Mar 2025 04:25) (88 - 104)  BP: 94/57 (27 Mar 2025 04:25) (87/50 - 94/58)  RR: 18 (27 Mar 2025 04:25) (18 - 18)  SpO2: 94% (27 Mar 2025 04:25) (91% - 96%)    Parameters below as of 27 Mar 2025 04:25  Patient On (Oxygen Delivery Method): room air      I&O's Summary    26 Mar 2025 07:01  -  27 Mar 2025 07:00  --------------------------------------------------------  IN: 0 mL / OUT: 1650 mL / NET: -1650 mL        PHYSICAL EXAM:  General: No acute distress BMI-24  HEENT: EOMI, PERRL  Neck: Supple, [ ] JVD  Lungs: Equal air entry bilaterally; [ ] rales [ ] wheezing [ ] rhonchi  Heart: Regular rate and rhythm; [x ] murmur   2/6 [ x] systolic [ ] diastolic [ ] radiation[ ] rubs [ ]  gallops  Abdomen: Nontender, bowel sounds present  Extremities: No clubbing, cyanosis, [ ] edema [x ] Right AKA  Nervous system:  Alert & Oriented X3, no focal deficits  Psychiatric: Normal affect  Skin: No rashes or lesions    LABS:  03-27    133[L]  |  97  |  38[H]  ----------------------------<  93  4.6   |  22  |  1.27    Ca    8.2[L]      27 Mar 2025 05:49  Mg     1.8     03-27      Creatinine Trend: 1.27<--, 1.49<--, 1.21<--, 1.07<--, 1.06<--, 0.96<--                        9.1    10.25 )-----------( 283      ( 27 Mar 2025 05:48 )             26.2     Culture - Urine (03.24.25 @ 07:13)   Specimen Source: Catheterized Catheterized  Culture Results: >100,000 CFU/ml Klebsiella pneumoniae ESBL   - Ceftriaxone: S <=1      VA Duplex Low Ext Arterial, Ltd, Left (03.21.25 @ 16:22) >  IMPRESSION:    Calcified plaque.  Moderate stenosis of the superficial femoral artery in the mid thigh.  High-grade stenosis of the anterior tibial artery in the upper calf.  Segmental occlusion of the mid and distal posterior tibial artery.  Examination findings were conveyed to nurse practitioner Lucho by  vascular technologist Cameron at 1620 hours on 3/21/2025 with read back.      TTE Limited W or WO Ultrasound Enhancing Agent (03.20.25 @ 17:08) >  CONCLUSIONS:      1. Left ventricular systolic function is severely decreased with an ejection fraction of 31 % by Winchester's method of disks.   2. Multiple segmental abnormalities exist. See findings.   3. Normal right ventricular cavity size and reduced right ventricular systolic function.   4. No pericardial effusion seen.   5. Compared to the transthoracic echocardiogram performed on 3/10/2025, there havebeen no significant interval changes.   6. There is no evidence of a left ventricular thrombus.   7. There is normal LV mass and normal geometry.    12 Lead ECG (03.11.25 @ 04:35) >  SINUS RHYTHM WITHMARKED SINUS ARRHYTHMIA  POSSIBLE ANTERIOR INFARCT , AGE UNDETERMINED  ABNORMAL ECG        Cardiac Catheterization (03.04.25 @ 09:07) >  Conclusions:   Impella placed for HR-PCI (pt was turned down for CABG). EF 25%     Severe proximal LCx stenosis s/p successful rotational atherectomy and balloon angioplasty.   Severe LM and proximal LAD stenosis s/p successful rotational atherectomy, balloon angioplasty, and VERA x2.

## 2025-03-27 NOTE — PROGRESS NOTE ADULT - PROBLEM SELECTOR PLAN 6
-S/p Srini placement  -Management per renal
-S/p Srini placement  -Management per renal
-Requiring HD  -S/p Shiley placement  -S/p tunneled HD cath placement in IR 3/11  -Management per renal.
-S/p Srini placement  -Management per renal
-Requiring HD, now with renal recovery. Last HD 3/14  -S/p Shiley placement  -S/p tunneled HD cath placement in IR 3/11, now removed  -Management per renal.
-S/p Srini placement  -Management per renal
-Requiring HD  -S/p Shiley placement  -S/p tunneled HD cath placement in IR 3/11  -Management per renal, now with evidence of renal recovery.
-S/p Srini placement  -Management per renal
-S/p Srini placement  -Management per renal
-Now requiring HD   -S/p Shiley placement  -S/p tunneled HD cath placement in IR 3/11  -Management per renal.
-Now requiring HD   -S/p Shiley placement  -S/p tunneled HD cath placement in IR 3/11  -Management per renal.
-Requiring HD  -S/p Shiley placement  -S/p tunneled HD cath placement in IR 3/11  -Management per renal.
-Requiring HD  -S/p Shiley placement  -S/p tunneled HD cath placement in IR 3/11  -Management per renal.
-S/p Srini placement  -Management per renal
-S/p Srini placement  -Management per renal
-Now requiring HD   -S/p Shiley placement  -S/p tunneled HD cath placement in IR 3/11  -Management per renal.
-S/p Shiley placement  -S/p tunneled HD cath placement in IR this AM  -Management per renal
-S/p Srini placement  -Management per renal
-Now requiring HD   -S/p Shiley placement  -S/p tunneled HD cath placement in IR 3/11  -Management per renal.
-Requiring HD, now with renal recovery. Last HD 3/14  -S/p Shiley placement  -S/p tunneled HD cath placement in IR 3/11, now removed  -Management per renal.

## 2025-03-27 NOTE — CHART NOTE - NSCHARTNOTEFT_GEN_A_CORE
Request from Dr. Lu to facilitate patient discharge. Medication reconciliation reviewed, revised, and resolved with Dr. Lu, who has medically cleared patient for discharge with follow up as advised. Please refer to discharge note for detailed hospital course.

## 2025-03-27 NOTE — PROGRESS NOTE ADULT - PROBLEM SELECTOR PLAN 1
-Primarily with exertion & when laying flat (resolved). Pt denies SOB at rest.  -Suspect 2nd to fluid overload, underlying CHF  -Keep O>I as tolerated  -VBG 2/14 acceptable  -S/p ABX per primary team for LE wounds  -CTA chest with b/l pl effusions R>L, congestion, negative for PE  -Keep sats >90% with O2 PRN (currently RA).  -SOB and cough resolved   -Incentive spirometry use encouraged  -CXR 3/26 with mild congestion, small L pl effusion

## 2025-03-27 NOTE — ADVANCED PRACTICE NURSE CONSULT - RECOMMEDATIONS
B/L buttock evolving DTI  - Cover entire wound/area with a thin layer TRIAD. If soiling occurs, use no-rinse cleanser spray to remove contaminants, leaving a thin layer when cleansing. Do not scrub to remove. Apply BID/PRN soiling.    Preventative  - Continue w/ low air loss pressure redistribution bed surface.  - Moisturize skin with Sween24 daily after bathing.  - Continue turning and positioning q2h and utilize offloading devices as per protocol (i.e. Duncan-Lock for heels while in bed).  - Avoid use of diapers and continue with only one breathable underpad while in bed.  - Utilize external catheters if patient unable to toilet OOB.  - Routine marcus-care with BID/PRN protective barrier cream (i.e. Unique or Critic-Aid Clear) for soiling.  - Waffle cushion if OOB to chair.  - Nutrition Consult for optimization in pt w/ increased nutritional needs.  - Encourage high quality protein, Anders/Prosource, MVI & Vit C to promote wound healing.    For questions/comments regarding the recommendations in this consult, please contact Gay Esqueda via [employer] Teams. Wound care will not actively follow. For new concerns, please enter new consult. Thank you!

## 2025-03-27 NOTE — ADVANCED PRACTICE NURSE CONSULT - REASON FOR CONSULT
Follow up skin injury prior to discharge.    As per the H&P:  64F, hx of CHF (last TTE on 1/16/25 EF 30-35%, G2DD), DM, diabetic foot ulcer with a recent admission at [facility] for CHF exacerbation presented as a transfer for worsening R. leg pain and fluid secretion, On non-invasive imaging demonstrating severe depletion of RLE blood flow beyond the popliteal vessel at knee and similar findings in L. Was transferred to [facility] for CO2 angiogram with Dr. Baltazar. (29 Jan 2025 20:05)

## 2025-03-27 NOTE — PROGRESS NOTE ADULT - REASON FOR ADMISSION
Transfer for CO2 angiogram; PAD and CLTI
SOB
Transfer for CO2 angiogram; PAD and CLTI

## 2025-03-27 NOTE — DISCHARGE NOTE NURSING/CASE MANAGEMENT/SOCIAL WORK - PATIENT PORTAL LINK FT
You can access the FollowMyHealth Patient Portal offered by [facility] by registering at the following website: [email or web address]. By joining [employer]’s FollowMyHealth portal, you will also be able to view your health information using other applications (apps) compatible with our system.

## 2025-03-27 NOTE — PROGRESS NOTE ADULT - ASSESSMENT
65 YO F with PMHx of HFrEF 30-35 and grade 2 ddfxn (last TTE on 01/16/25), DM2, and diabetic foot ulcer. Recent admission at [facility] for ADHF. Patient now represents to OSH and ultimately to [facility] as a transfer for worsening right leg pain and fluid secretion. As per documentation, patient was reported to have severe depletion of RLE blood flow beyond the popliteal vessel at knee and similar findings in the left. Patient was transferred to [facility] for CO2 angiogram with Dr. Baltazar. Of note, patient also with reported visual disturbance for which patient was seen and evaluated by opthalmology. Internal Medicine has been consulted on Ms. Kirby's care for medical management.       # Foot ulcers with worsening RLE pain second to pop artery occlusion +/- diabetic ulcers   - RLE Arterial Duplex with popliteal artery occlusion   - LLE Arterial Duplex with underlying disease cannot be excluded   - s/p angio with pop and AT VERA on 2/24   - c/b necrosis of RLE   - S/P R AKA 3/17   - Continue on Lipitor and DAPT  - Continue pain control with oxy  - Wound care called for dressing recs   - Pain management   - Vascular and Podiatry evals appreciated; F/u recs     # LLE PAD   - DOPPLER 3/21 with Calcified plaque, Moderate stenosis of superficial femoral artery, High-grade stenosis of anterior tibial artery in upper calf, Segmental occlusion of mid and distal posterior tibial artery   - Continue on lipitor and DAPT as above   - Discussed with Vascular, patient and family --> Planned for outpatient follow up with Dr. Baltazar for Angiogram. Family is in agreement     # ADHF/ BiV HFrEF 20   - Recent admission at [facility] for ADHF and on dobutamine outpatient  - TTE 1/16 with EF 30-35 with moderately reduced LVSF and grade 2 ddfxn, normal RVSF , trace TR/ ND, and trace pericardial effusion  - RPT TTE with EF 20, severely decreased LV, decreased RVSF with TAPSE 1.2, and regional wall motion abnormalities present with entire septum, entire apex, entire inferior wall, mid inferolateral segment, and mid anterolateral segment are hypokinetic.   - RHC with RA 20, PA 49/29 (40), PCWP 35, mVO2 51.1, CO/CI 3.8/2.2, SVR 1095 w/ Multivessel CAD   - s/p zaroxyln 10 QD to augment diuresis   - s/p bumex GTT   - s/p HTS to augment diuresis   - RPT limited TTE w/ LVSF severely decreased, reduced RVSF, LVOT VTI 12, mild to mod TR, and mildly elevated right atrial pressure  - s/p bumex 4 IV QD, but became anuric and dc'ed   - RPT ECHO post cath with EF 28 %, regional WMA, multiple segmental abnormalities exist, and reduced RVSF    - Case discussed with HF and cards and monitoring off milrinone GTT  - JAMEL as below resolved and urine output improved   - RPT ECHO with EF 31 with severely decreased LVSD and reduced RVSF   - Continue on torsemide 20 and aldactone per Cards/ renal   - Continue on entresto and toprol 25 for GDMT per Cards  - Case discussed with EP and needs to be on GDMT for 3 months before AICD placement and should be stabilized off of inotropic support.    - Monitor I and O   - Monitor volume status   - Check daily weights    - Monitor on telemetry  - Monitor electrolytes   - Cardio, HF, Renal, and EP evals appreciated; F/u recs     # Bradycardia   - SB to 48 BPM at 0400 while asleep on 3/11 and likely related to BB , however last dose prior to event was 3/10 at 0600.  - Resume on BB as above   - Monitor telemetry  - EP eval appreciated; F/u recs     # Tachycardia and Hypoxia  - Tachycardiac and on RA with SPO2 running 90-92   - Could be second to low cardiac output   - CTA with no PE  - Duplex negative for DVT   - On Toprol XL 25 PO QD.   - Monitor HR   - Monitor closely on Tele  - Cardio and Pulm eval appreciated; F/u recs    # JAMEL and Metabolic Acidosis   - Baseline CRE 0.7-1.2 and increased to ~4  - As per OSH documentation, JAMEL was thought to be second to Unasyn/ Bumex / Entresto use and Hypotension   - US RENAL with no hydronephrosis  - Likely cardiorenal induced with low flow state in ADHF, however now rising again and concern for milrinone vs overdiuresis (prerenal) vs cardiorenal.   - Milrinone adjusted and diuresis turned off, however no improvement/ worsened in renal function noted.   - s/p shiley placement and started on HD 2/20  - s/p permacath 3/11  - Passed TOV  - Urine output improved and JAMEL resolved  - Last HD session 3/14  - S/P permacath removal on 3/24  - Avoid nephrotoxic agents  - Monitor Cr and daily BMP   - Renal and HF evals appreciated; F/u recs    # Hyponatremia   - Trend Na  - Avoid overcorrection > 6-8 mEq in 24 hours  - Renal eval appreciated; F/u recs     # UTI   - UCx with > 100K klebsiella ESBL  - Continue on rocephin   - Monitor and trend CBC, temp curve, VS and adjust as tolerated    # CAD with TVD   - NST abnormal with small-sized, moderate defect in apical wall that is predominantly fixed suggestive of an infarction with minimal marcus-infarct ischemia. Post stress LVEF 25.  - s/p LHC with RCA , severe ostial LM disease, diffuse disease in LAD and LCx, some L to R collaterals (Multivessel CAD)  - Case discussed with CTSx and NOT a candidate for CABG due to comorbidities  - Cardiac MRI with greater than 75% subendocardial scarring of the basal to mid-inferior wall of the LV and 50% scarring of the left ventricular basal inferoseptal wall. There is also left ventricular transmural scarring involving the apical septal wall and likely near transmural scarring of the apical lateral wall.  - s/p RPT LHC 3/4 with LM 80 s/p POBA/ VERA with LM 1, pLAD 90 s/p POBA/ VERA with pLAD 1, and Cx 80 s/p POBA with Cx 10.   - DAPT and Statin per cardiology   - IC, Cards and HF following     # BL LE Edema likely in setting of ADHF  - Remove volume as per Cardio, HF and Renal   - BL LE elevation and compression  - LE Duplex neg   - Monitor for now    # Pericardial effusion likely in setting ADHF  - TTE with trace pericardial effusion   - CT Chest with small pericardial effusion   - Denies chest pain, palpitations, chest tightness or discomfort, shortness of breath or dyspnea   - Repeat serial TTE for further evaluation   - Diuresis per cardio/ renal.  - Monitor on telemetry  - Cardio following    # Pleural effusion likely in setting ADHF  - CT Chest with small BL pleural effusions  - Monitor O2 saturation  - Supplement to maintain > 90%   - Diuresis / HD per cardio/ renal.     # Hypernatremia to hyponatremia  - Hypernatremia likely from HTS vs over diuresis/ intravascular dehydration. HTS and diuresis stopped with improved sodium   - Hyponatremia now noted second to volume overload   - Avoid overcorrection > 6-8 mEq in 24 hours  - Monitor sodium with HD    # Transaminitis  - Likely 2/2 hepatic congestion   - Volume removal with HD as tolerated  - Trend LFTs, if uptrend post-HD initiation, check ABD US  - Serial ABD Examinations    # Leukocytosis likely in setting of BL LE ulcers with pop occlusion   - BCx negative   - UCx with probable contamination   - S/P unasyn for ABX.   - Leukocytosis fluctuating, however appears nontoxic with no fever spikes and monitoring closely off ABX  - If febrile check pan cultures   - Trend CBC, temp curve, VS and adjust as tolerated      # Visual Disturbance  - CT Head w/ old infarcts  - On ASA and Statin   - Opthalmology eval appreciated    # Indigestion and Constipation   - PPI, miralax and senna continued  - Monitor BMs    # Anemia likely mixed AOCD vs iron deficiency   - HH stable in 9s on HSQ  - Anemia panel with AOCD   - Started on venofer, but ferritin high and now stopped   - Continue on EPO with HD per renal  - Monitor HH   - Transfuse for Hgb < 8  - Maintain active T/S    # Diabetes Mellitus A1C 11.6   - Continue on lantus 13 with lispro 5 and ISS   - Diabetic DASH diet   - Monitor and adjust glucose levels PRN   - Endocrine following; F/u recs     # HLD and HLD  - Continue on lipitor and toprol  - Monitor BP    # DISPO TBD  - Palliative care called and patient remains FULL CODE     Discussed with patient and her family at the bedside.   Discussed with Attending, ACP and Renal.     DC planning underway

## 2025-03-27 NOTE — PROGRESS NOTE ADULT - TIME-BASED
60
55
60
65
55
60
55
55
60
55
60
55
55
60
55
60
50

## 2025-03-27 NOTE — PROGRESS NOTE ADULT - PROVIDER SPECIALTY LIST ADULT
Cardiology
Endocrinology
Endocrinology
Heart Failure
Internal Medicine
Intervent Cardiology
Nephrology
Podiatry
Podiatry
Pulmonology
Pulmonology
Vascular Surgery
Cardiology
Endocrinology
Heart Failure
Heart Failure
Internal Medicine
Nephrology
Pulmonology
Vascular Surgery
Vascular Surgery
Cardiology
Endocrinology
Heart Failure
Internal Medicine
Nephrology
Pulmonology
Pulmonology
Vascular Surgery
Cardiology
Internal Medicine
Intervent Cardiology
Nephrology
Podiatry
Pulmonology
Vascular Surgery
Cardiology
Heart Failure
Heart Failure
Pulmonology
Cardiology
Endocrinology
Heart Failure
Pulmonology
Cardiology
Heart Failure
Pulmonology
Endocrinology
Pulmonology
Endocrinology
Heart Failure
Pulmonology
Endocrinology
Heart Failure
Pulmonology
Endocrinology
Endocrinology
Heart Failure
Pulmonology
Pulmonology
Endocrinology
Heart Failure
Pulmonology
Endocrinology
Heart Failure
Pulmonology
Heart Failure
Pulmonology

## 2025-03-27 NOTE — PROGRESS NOTE ADULT - TIME-BASED BILLING (NON-CRITICAL CARE)
Time-based billing (NON-critical care)

## 2025-03-27 NOTE — PROGRESS NOTE ADULT - SUBJECTIVE AND OBJECTIVE BOX
[facility] NEPHROLOGY- PROGRESS NOTE    64y Female with history of CHF presents as a transfer for LE CO2 angiogram. Nephrology consulted for elevated Scr.    Patient s/p R AKA on 3/17.     REVIEW OF SYSTEMS:  Gen: no fevers  Cards: no chest pain  Resp: no dyspnea  GI: no nausea or vomiting or diarrhea  Vascular: no LLE edema    Augmentin (Stomach Upset; Vomiting; Nausea)  No Known Allergies      Hospital Medications: Medications reviewed        VITALS:  T(F): 98.1 (03-27-25 @ 11:46), Max: 98.3 (03-26-25 @ 20:46)  HR: 92 (03-27-25 @ 11:46)  BP: 91/61 (03-27-25 @ 11:46)  RR: 18 (03-27-25 @ 11:46)  SpO2: 97% (03-27-25 @ 11:46)  Wt(kg): --    03-26 @ 07:01  -  03-27 @ 07:00  --------------------------------------------------------  IN: 0 mL / OUT: 1650 mL / NET: -1650 mL    03-27 @ 07:01  -  03-27 @ 13:27  --------------------------------------------------------  IN: 500 mL / OUT: 400 mL / NET: 100 mL        PHYSICAL EXAM:  Gen: NAD, calm  Cards: RRR, +S1/S2, no M/G/R  Resp: CTA B/L  GI: soft, NT/ND, NABS  : + stiles  Vascular: R AKA, no LLE edema, + RIJ TDC removed with site unremarkable         LABS:  03-27    133[L]  |  97  |  38[H]  ----------------------------<  93  4.6   |  22  |  1.27    Ca    8.2[L]      27 Mar 2025 05:49  Mg     1.8     03-27      Creatinine Trend: 1.27 <--, 1.49 <--, 1.21 <--, 1.07 <--, 1.06 <--, 0.96 <--, 1.03 <--                        9.1    10.25 )-----------( 283      ( 27 Mar 2025 05:48 )             26.2     Urine Studies:  Urinalysis Basic - ( 27 Mar 2025 05:49 )    Color:  / Appearance:  / SG:  / pH:   Gluc: 93 mg/dL / Ketone:   / Bili:  / Urobili:    Blood:  / Protein:  / Nitrite:    Leuk Esterase:  / RBC:  / WBC    Sq Epi:  / Non Sq Epi:  / Bacteria:       Sodium, Random Urine: 85 mmol/L (03-25 @ 13:16)  Osmolality, Random Urine: 260 mos/kg (03-25 @ 13:16)

## 2025-03-27 NOTE — PROGRESS NOTE ADULT - PROBLEM SELECTOR PLAN 3
- LDL goal <70   - C/w statin therapy or recommend statin therapy if not contraindicated   - Check lipid panel as outpatient on a yearly basis
-s/p RLE angiography and pop/AT angioplasty
LDL goal <55 due to DM and vascular complications  Pt LDL 66  Statin as noted above  Consider low cholesterol diet   Manage per primary team   F/u levels as out pt
LDL goal <55 due to DM and vascular complications  Pt LDL 66  Statin as noted above  Consider low cholesterol diet   Manage per primary team   F/u levels as out pt
LDL goal <55 due to DM and vascular complications  Pt LDL 66  Statin as noted above  Consider low cholesterol diet   Manage per primary team   F/u levels as out pt      Contact via Microsoft Teams during business hours  To reach covering provider access AMION via sunrise tools  For Urgent matters/after-hours/weekends/holidays please page endocrine fellow on call   For nonurgent matters please email [email or web address]    Please note that this patient may be followed by different provider tomorrow.  Notify endocrine 24 hours prior to discharge for final recommendations
vascular planning on lower extremity angio (CO2)
-CT chest 1/25 with small b/l pl effusions, small pericardial effusion  -CXR 2/11 with mild basilar atelectasis/effusion   -CTA chest with b/l pl effusions R>L, congestion   -Keep O>I as tolerated.
-CT chest 1/25 with small b/l pl effusions, small pericardial effusion  -CXR 2/11 with mild basilar atelectasis/effusion   -CTA chest with b/l pl effusions R>L, congestion   -Keep O>I with HD as tolerated.
-CT chest 1/25 with small b/l pl effusions, small pericardial effusion  -CXR 2/11 with mild basilar atelectasis/effusion   -Keep O>I as tolerated.
LDL goal <55 due to DM and vascular complications  Pt LDL 66  Statin as noted above  Consider low cholesterol diet   Manage per primary team   F/u levels as out pt
LDL goal <55 due to DM and vascular complications  Pt LDL 66  Statin as noted above  Consider low cholesterol diet   Manage per primary team   F/u levels as out pt      Contact via Microsoft Teams during business hours  To reach covering provider access AMION via sunrise tools  For Urgent matters/after-hours/weekends/holidays please page endocrine fellow on call   For nonurgent matters please email [email or web address]    Please note that this patient may be followed by different provider tomorrow.  Notify endocrine 24 hours prior to discharge for final recommendations
- LDL goal <70   - C/w statin therapy or recommend statin therapy if not contraindicated   - Check lipid panel as outpatient on a yearly basis
-s/p RLE angiography and pop/AT angioplasty
LDL goal <55 due to DM and vascular complications  Pt LDL 66  Statin as noted above  Consider low cholesterol diet   Manage per primary team   F/u levels as out pt
LDL goal <55 due to DM and vascular complications  Pt LDL 66  Statin as noted above  Consider low cholesterol diet   Manage per primary team   F/u levels as out pt      Contact via Microsoft Teams during business hours  To reach covering provider access AMION via sunrise tools  For Urgent matters/after-hours/weekends/holidays please page endocrine fellow on call   For nonurgent matters please email [email or web address]    Please note that this patient may be followed by different provider tomorrow.  Notify endocrine 24 hours prior to discharge for final recommendations
-CT chest 1/25 with small b/l pl effusions, small pericardial effusion  -CXR 2/11 with mild basilar atelectasis/effusion   -CTA chest with b/l pl effusions R>L, congestion   -Keep O>I as tolerated.
LDL goal <55 due to DM and vascular complications  Pt LDL 66  Statin as noted above  Consider low cholesterol diet   Manage per primary team   F/u levels as out pt        Contact via Microsoft Teams during business hours  To reach covering provider access AMION via sunrise tools  For Urgent matters/after-hours/weekends/holidays please page endocrine fellow on call   For nonurgent matters please email [email or web address]    Please note that this patient may be followed by different provider tomorrow.  Notify endocrine 24 hours prior to discharge for final recommendations
-CT chest 1/25 with small b/l pl effusions, small pericardial effusion  -CXR 2/11 with mild basilar atelectasis/effusion   -CTA chest with b/l pl effusions R>L, congestion   -Keep O>I as tolerated
LDL goal <55 due to DM and vascular complications  Pt LDL 66  Statin as noted above  Consider low cholesterol diet   Manage per primary team   F/u levels as out pt        Contact via Microsoft Teams during business hours  To reach covering provider access AMION via sunrise tools  For Urgent matters/after-hours/weekends/holidays please page endocrine fellow on call   For nonurgent matters please email [email or web address]    Please note that this patient may be followed by different provider tomorrow.  Notify endocrine 24 hours prior to discharge for final recommendations
vascular planning on lower extremity angio (CO2)
-CT chest 1/25 with small b/l pl effusions, small pericardial effusion  -CXR 2/11 with mild basilar atelectasis/effusion   -Keep O>I as tolerated.
-s/p RLE angiography and pop/AT angioplasty
LDL goal <55 due to DM and vascular complications  Pt LDL 66  Statin as noted above  Consider low cholesterol diet   Manage per primary team   F/u levels as out pt
LDL goal <55 due to DM and vascular complications  Pt LDL 66  Statin as noted above  Consider low cholesterol diet   Manage per primary team   F/u levels as out pt
vascular planning on lower extremity angio next week tenatively Wednesday  - Closely monitor renal function in the meantime while on diuresis
LDL goal <55 due to DM and vascular complications  Pt LDL 66  Statin as noted above  Consider low cholesterol diet   Manage per primary team   F/u levels as out pt
LDL goal <55 due to DM and vascular complications  Pt LDL 66  Statin as noted above  Consider low cholesterol diet   Manage per primary team   F/u levels as out pt        Contact via Microsoft Teams during business hours  To reach covering provider access AMION via sunrise tools  For Urgent matters/after-hours/weekends/holidays please page endocrine fellow on call   For nonurgent matters please email [email or web address]    Please note that this patient may be followed by different provider tomorrow.  Notify endocrine 24 hours prior to discharge for final recommendations
-CT chest 1/25 with small b/l pl effusions, small pericardial effusion  -CXR 2/11 with mild basilar atelectasis/effusion   -CTA chest with b/l pl effusions R>L, congestion   -Keep O>I as tolerated
-CT chest 1/25 with small b/l pl effusions, small pericardial effusion  -CXR 2/11 with mild basilar atelectasis/effusion   -Keep O>I as tolerated.
-CT chest 1/25 with small b/l pl effusions, small pericardial effusion  -CXR 2/11 with mild basilar atelectasis/effusion   -Keep O>I as tolerated.
-s/p RLE angiography and pop/AT angioplasty
vascular planning on lower extremity angio next week tenatively Wednesday  - Closely monitor renal function in the meantime while on diuresis
-s/p RLE angiography and pop/AT angioplasty
-s/p RLE angiography and pop/AT angioplasty
LDL goal <55 due to DM and vascular complications  Pt LDL 66  Statin as noted above  Consider low cholesterol diet   Manage per primary team   F/u levels as out pt
LDL goal <55 due to DM and vascular complications  Pt LDL 66  Statin as noted above  Consider low cholesterol diet   Manage per primary team   F/u levels as out pt        Contact via Microsoft Teams during business hours  To reach covering provider access AMION via sunrise tools  For Urgent matters/after-hours/weekends/holidays please page endocrine fellow on call   For nonurgent matters please email [email or web address]    Please note that this patient may be followed by different provider tomorrow.  Notify endocrine 24 hours prior to discharge for final recommendations
vascular planning on lower extremity angio (CO2)
-CT chest 1/25 with small b/l pl effusions, small pericardial effusion  -CXR 2/11 with mild basilar atelectasis/effusion   -Keep O>I as tolerated.
-CT chest 1/25 with small b/l pl effusions, small pericardial effusion  -CXR 2/11 with mild basilar atelectasis/effusion   -Keep O>I as tolerated.
vascular planning on lower extremity angio (CO2)
-s/p RLE angiography and pop/AT angioplasty
LDL goal <55 due to DM and vascular complications  Pt LDL 66  Statin as noted above  Consider low cholesterol diet   Manage per primary team   F/u levels as out pt
-CT chest 1/25 with small b/l pl effusions, small pericardial effusion  -CXR 2/11 with mild basilar atelectasis/effusion   -CTA chest with b/l pl effusions R>L, congestion   -Keep O>I as tolerated.
-CT chest 1/25 with small b/l pl effusions, small pericardial effusion  -CXR 2/11 with mild basilar atelectasis/effusion   -CTA chest with b/l pl effusions R>L, congestion   -Keep O>I as tolerated
-CT chest 1/25 with small b/l pl effusions, small pericardial effusion  -CXR 2/11 with mild basilar atelectasis/effusion   -Keep O>I as tolerated.
-CT chest 1/25 with small b/l pl effusions, small pericardial effusion  -CXR 2/11 with mild basilar atelectasis/effusion   -Keep O>I as tolerated.
-CT chest 1/25 with small b/l pl effusions, small pericardial effusion  -CXR 2/11 with mild basilar atelectasis/effusion   -CTA chest with b/l pl effusions R>L, congestion   -Keep O>I with HD as tolerated.
-CT chest 1/25 with small b/l pl effusions, small pericardial effusion  -CXR 2/11 with mild basilar atelectasis/effusion   -CTA chest with b/l pl effusions R>L, congestion   -Keep O>I as tolerated.
-CT chest 1/25 with small b/l pl effusions, small pericardial effusion  -CXR 2/11 with mild basilar atelectasis/effusion   -CTA chest with b/l pl effusions R>L, congestion   -Keep O>I as tolerated.
-CT chest 1/25 with small b/l pl effusions, small pericardial effusion  -CXR 2/11 with mild basilar atelectasis/effusion   -Keep O>I as tolerated.

## 2025-03-27 NOTE — PROGRESS NOTE ADULT - ASSESSMENT
64F, hx of CHF (last TTE on 1/16/25 EF 30-35%, G2DD), DM, diabetic foot ulcer with a recent admission at [facility] for CHF exacerbation 1/14-1/17 p/w worsening R. leg pain and fluid secretion, was meeting sepsis criteria with podiatry having low suspicion for right cellulitis and admitted for continued management of HF exacerbation and needing ischemic eval.     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  -Left leg Arterial Duplex: Slightly tardus parvus waveform of the left popliteal artery is noted, for which underlying disease cannot be excluded. Posterior tibial artery waveform is nonpulsatile. Anterior tibial artery tardus parvus flow is noted.   Transferred to [facility] for CO2 angiogram as per vascular surgery    #  PAD Wound of lower extremity.   ·  Plan: Podiatry following, b/l serous bullae, no concerns for infection  Found to have RLE coolness  -Right Leg Arterial Duplex: Popliteal artery is occluded with negligible flow of right trifurcation arteries.  -Left leg Arterial Duplex: Slightly tardus parvus waveform of the left popliteal artery is noted, for which underlying disease cannot be excluded. Posterior tibial artery waveform is nonpulsatile. Anterior tibial artery tardus parvus flow is noted.  -Started on heparin gtt and dobutamine gtt -Will transfer to [facility] for CO2 angiogram as per vascular surgery as not candidate for CTA due to worsening SCr  -Keep compression dressing  -LE elevation above heart level at rest.  -Transferred to [facility] for CO2 angiogram   - Overall this patient is at   intermediate  risk (for cardiac death, nonfatal myocardial infarction, and nonfatal cardiac arrest perioperatively for this intermediate  risk procedure).   No cardiac contraindications for CO2 vangiogram  There  are  no further recommendation for risk stratifying imaging/stress testing prior to planned surgery  Seen by Podiatry-No acute pod intervention, local wound care only-   PAD with rest ischemia  s/p AT/Popliteal angioplasty on DAPT  - RLE extensive gangrnous changes to foot dorsum, ankle, heel dorsomedial and lateral lower leg, ischemic changes to 2nd digit and hallux,  Left foot hallux distal tuft well adhered eschar, no acute signs of infection.   -Seen by vascular Plan for AKA   -s/p Right AKA  -LLE Arterial Duplex-Moderate stenosis of the superficial femoral artery in the mid thigh.  High-grade stenosis of the anterior tibial artery in the upper calf.  Segmental occlusion of the mid and distal posterior tibial artery.  -Vascular follow up      # HFrEF (congestive heart failure). Ischemic Cardiomyopathy  ·  Plan: Hx of HF, previous admission in January, on home Lasix 40 qD, Metoprolol Succ 25 qD. Not on Entresto due to insurance issues  Last TTE: LVSF moderately decreased w/ EF 30-35 %. Moderate G2DD. Mild MR  Stress test: small-sized, moderate defect(s) in the apical wall that is predominantly fixed suggestive of an infarction with minimal marcus-infarct ischemia  Ischemic cardiomyopathy  GDMT on hold 2/2 JAMEL  Repeat TTE EF 20% with WMA RAP~~8  Repeat Limited TTE  Taper off Milrinone and start GDMT  Is currently off Hydral/Isdn and Bumex 2/2 soft BP  Continue HD with UF had 2900mL removed yesterday will stop Milrinone and observe  Started  low dose BBlocker Metoprolol tartate 12.5 mg PO q8  03/10-Off Midodrine will reattempt Hyd/Isdn  03/11-Daily HD/UF   03/12-Awake s/p Tunelled HD catheter-Plan for Discharge to [facility]  03/13-EP evaluation Arias episodes  03/14-Increasing necrosis noted Right LE Vascular planning for Right AKA   03/15-Right AKAvs BKA on Monday 03/16- Awake Sinus Rhythm clinically stable for OR tomorrow    03/17-Somnolent not dyspneac for OR today  03/19-POD#2  R AKA No dyspnea BP stable   03/20-POD#3 Stable start GDMT for HF Limited TTE    03/21-Awake afebrile TTE EF 31% no dyspnea Creat 1.03  -HF GDMT-Metoprolol succ 25m             03/23-Started on Entresto  03/27-Diuretics [place] held 2/2 JAMEL-encourage PO fluids  on Ceftriaxone for ESBL K Pneumoniae        # CAD  LHC-RCA , severe ostial LM disease, diffuse disease in LAD and LCx, some L to R collaterals-seen by CTS advise cMR for viability poor target vessels for CABG-?High risk LM/LAD PCI  Had cMR-LVEF is 25%, greater than 75% subendocardial scarring involving the basal to mid inferior wall of the left ventricle, left ventricular transmural scarring involving the apical septal wall and likely near transmural scarring of the apical lateral wall. 50% scarring of the left ventricular basal inferoseptal wall.  03/05-s/p PCI-LM/LAD   Continue  uninterrupted DAPT        # JAMEL on CKD   Creatinine Trend:1.21 <--1.06<-- 0.96<--1.03 <--0.99<--, 1.09<--1.47 <--1.80<--2.99 <--3.61 <---3.38<--(HD)-- 4.76<--4.07<---3.90<-- 1.17  Has had 3 sessions HD for interrmittent HD to allow contrast based procedures is non oliguric  Plan to continue HD likely will need extended RRT  Shiley change on 03/11  Permacath  Been off dialysis since 03/17  Renal function improved  Permacath removal  03/24

## 2025-03-27 NOTE — ADVANCED PRACTICE NURSE CONSULT - ASSESSMENT
Met with the patient on 4MON. Patient lying on her left side on a [place] specialty bed upon arrival. She is awake and alert and consented to a skin consult. Offloading and pericare initiated upon admission as pt Increasingly sedentary 2/2 to illness. +[place]. Incontinent of stool. Supportive  at the bedside.    Upon assessment, patient with a R AKA being managed by vascular and a L foot wound by podiatry. With assistance, patient turned to her right side for an assessment. Patient noted to be incontinent of formed stool with turning. Triad paste noted to be covering B/L buttocks. Used no-rinse cleanser to remove contaminants and some of the Triad to visualize the wounds without disrupting the tissue. R buttocks with partial thickness skin loss measuring 3cm x 2cm x 0.1cm. Tissue moist, red, and nonblanchable. TTP. L buttocks with wound measuring 1.5cm x 1.5cm x 0cm and covered with firmly adherent yellow fibrinous tissue. Each periwound with no erythema, warmth, induration, or fluctuance noted. The appearance of these wounds likely originated as deep tissue injuries that are now in different stages of evolution. Explained to the patient and her  that the tissue may continue to change revealing the extent of the injury. Educated on the importance of routine turning and positioning to promote healing and prevent further injury while at [facility].

## 2025-03-27 NOTE — PROGRESS NOTE ADULT - ASSESSMENT
63 y/o F with PMH of CHF (last TTE 1/2025 with EF 30-35%), DM, diabetic foot ulcer, PAD, CAD. Recent admission at [facility] for CHF exacerbation, presented to [facility] as a transfer for worsening R leg pain. Hospital course c/b acute on chronic CHF - TTE with EF 20%, severely decreased LV and RV function, multiple regional motion abnormalities, s/p LHC revealing TVD, pleural/pericardial effusions, JAMEL, leukocytosis suspected to be in the setting of LE wound on IV ABX, persistent RLE pain w/ RLE Arterial Duplex revealing popliteal artery occlusion  Plan for eventual angiogram with vascular when medically optimized. Pulmonary called to consult as pt with c/o SOB.

## 2025-03-27 NOTE — PROGRESS NOTE ADULT - PROBLEM SELECTOR PROBLEM 6
JAMEL (acute kidney injury)

## 2025-03-27 NOTE — DISCHARGE NOTE NURSING/CASE MANAGEMENT/SOCIAL WORK - FINANCIAL ASSISTANCE
[facility] provides services at a reduced cost to those who are determined to be eligible through [facility]’s financial assistance program. Information regarding [facility]’s financial assistance program can be found by going to [email or web address] or by calling [phone].

## 2025-03-27 NOTE — CHART NOTE - NSCHARTNOTEFT_GEN_A_CORE
POCT Blood Glucose:  221 mg/dL (03-27-25 @ 16:43)  130 mg/dL (03-27-25 @ 12:20)  98 mg/dL (03-27-25 @ 08:12)  121 mg/dL (03-26-25 @ 21:20)      eMAR:atorvastatin   40 milliGRAM(s) Oral (03-26-25 @ 21:13)    insulin glargine Injectable (LANTUS)   17 Unit(s) SubCutaneous (03-26-25 @ 21:23)    insulin lispro Injectable (ADMELOG)   9 Unit(s) SubCutaneous (03-27-25 @ 12:43)   9 Unit(s) SubCutaneous (03-27-25 @ 09:10)    Diet, Regular:   Consistent Carbohydrate {No Snacks} (CSTCHO)  DASH/TLC {Sodium & Cholesterol Restricted} (DASH)  1000mL Fluid Restriction ([id number])  No Concentrated Potassium  Halal  Lacto Veg (Accepts Milk & Milk Products) (03-24-25 @ 09:25) [Active]  Diet, Regular:   Consistent Carbohydrate {No Snacks} (CSTCHO)  DASH/TLC {Sodium & Cholesterol Restricted} (DASH)  1000mL Fluid Restriction ([id number])  Halal  Lacto Veg (Accepts Milk & Milk Products) (03-21-25 @ 13:11) [Pending Verification By Attending]      Chart reviewed today. As per team, patient is going to rehab today   Last 24 hour BGs 93-100s with fasting BG 93 and prandial BGs in 100s     #Uncontrolled type 2 diabetes mellitus with hyperglycemia, with long-term current use of insulin.   · - Check BG TID AC and HS while on PO diet  - tightly controlled fasting BG , decrease Lantus to 16u QHS   - Continue Admelog to 9 units ac meals TID AC (HOLD if NPO) slow titration due to ESRD  - C/w low dose Admelog correctional scales TID AC and HS  Discharge Planning:   - Discharge to rehab today   Recommend   basal/bolus regimen given kidney function.   Recommend Lantus 16 units at HS  Recommend Admelog 9 units with meals ( Hold if not eating )   BGs should be checked ACTID and at HS and insulin doses should be adjuested at rehab  Not a candidate for SGLT2 due to leg ulcers and also significantly decreased EGFR.,   can consider GLP1 in addition to Basal/bolus> will need close f/u with Optho due to h/o retinopathy.   Can send Rx for ozempic 0.25mg weekly x 4weeks and increase to 0.50mg, or mounjaro 2.5mg 2.5mg x4 weeks, then increase to 5.0mg weekly. (Patient denies medullary thyroid cancer, hx of pancreatitis or MEN2)  - Please make sure patient has DM management supplies (glucometer, lancets, strips, alcohol pads, insulin pen needles).  - Patient should check BGs before meals and at bedtime. Contact PCP/endocrinology if BG is less than 70 X1, greater than  400 X1 or persistently greater than 200s.   - Endocrine follow up: can f/u with Dr. Grace, Endocrinology.   - Needs Optho/ renal/cardiac /podiatry and vascular follow up.      Contact via [employer] Teams during business hours  To reach covering provider access AMION via sunrise tools  For Urgent matters/after-hours/weekends/holidays please page endocrine fellow on call   For nonurgent matters please email [email or web address]    Please note that this patient may be followed by different provider tomorrow.  Notify endocrine 24 hours prior to discharge for final recommendations

## 2025-03-27 NOTE — PROGRESS NOTE ADULT - ASSESSMENT
64y Female with history of CHF presents as a transfer for LE CO2 angiogram. Nephrology consulted for elevated Scr.    1) JAMEL: due to ATN requiring RRT now resolved. S/P TDC removal on 3/24. S/P stiles for urinary retention. Would keep on discharge if patient transferred to rehab today with outpatient Urology follow up for TOV. Avoid nephrotoxins.    2) HTN: BP low normal. Can consider changing entresto to low dose losartan 25 mg daily if ok with cardiology.    3) LE edema: Restart diuretics but would decrease to torsemide 10 mg daily and aldactone 12.5 mg daily. Would need to evaluate for SGLT2 inhibitor as an outpatient pending stability in renal function. TTE with severely decreased LVSF. Monitor UO.     4) Anemia: Hb borderline with low TSAT. No IV iron given elevated ferritin. S/P Epo 8K X 1 dose 3/16. Monitor Hb.    5) Hyperkalemia: Resolved. On standing lokelma 10 grams PO daily. Continue with low K diet.        [facility] NEPHROLOGY  Raji Waterman M.D.  Mars Feliz D.O.  Kelley Parks M.D.  MD Nancy Kulkarni, MSN, ANP-C    Telephone: [phone]  Facsimile: [phone] [place] [address], [address]  [place] [id number]

## 2025-03-27 NOTE — PROGRESS NOTE ADULT - NS ATTEND OPT1 GEN_ALL_CORE

## 2025-03-27 NOTE — PROGRESS NOTE ADULT - PROBLEM SELECTOR PROBLEM 1
Uncontrolled type 2 diabetes mellitus with hyperglycemia, with long-term current use of insulin
Chronic HFrEF (heart failure with reduced ejection fraction)
Shortness of breath
Type 2 diabetes mellitus with hyperglycemia
Uncontrolled type 2 diabetes mellitus with hyperglycemia, with long-term current use of insulin
Shortness of breath
Type 2 diabetes mellitus with hyperglycemia
Type 2 diabetes mellitus with hyperglycemia
Shortness of breath
Shortness of breath
Chronic HFrEF (heart failure with reduced ejection fraction)
Shortness of breath
Type 2 diabetes mellitus with hyperglycemia
Uncontrolled type 2 diabetes mellitus with hyperglycemia, with long-term current use of insulin
Uncontrolled type 2 diabetes mellitus with hyperglycemia, with long-term current use of insulin
Chronic HFrEF (heart failure with reduced ejection fraction)
Chronic HFrEF (heart failure with reduced ejection fraction)
Shortness of breath
Uncontrolled type 2 diabetes mellitus with hyperglycemia, with long-term current use of insulin
Shortness of breath
Type 2 diabetes mellitus with hyperglycemia
Uncontrolled type 2 diabetes mellitus with hyperglycemia, with long-term current use of insulin
Type 2 diabetes mellitus with hyperglycemia
Uncontrolled type 2 diabetes mellitus with hyperglycemia, with long-term current use of insulin
Chronic HFrEF (heart failure with reduced ejection fraction)
Chronic HFrEF (heart failure with reduced ejection fraction)
Type 2 diabetes mellitus with hyperglycemia
Uncontrolled type 2 diabetes mellitus with hyperglycemia, with long-term current use of insulin
Uncontrolled type 2 diabetes mellitus with hyperglycemia, with long-term current use of insulin
Chronic HFrEF (heart failure with reduced ejection fraction)
Chronic HFrEF (heart failure with reduced ejection fraction)
Shortness of breath
Type 2 diabetes mellitus with hyperglycemia
Uncontrolled type 2 diabetes mellitus with hyperglycemia, with long-term current use of insulin
Chronic HFrEF (heart failure with reduced ejection fraction)
Chronic HFrEF (heart failure with reduced ejection fraction)
Shortness of breath
Uncontrolled type 2 diabetes mellitus with hyperglycemia, with long-term current use of insulin
Shortness of breath
Shortness of breath
Chronic HFrEF (heart failure with reduced ejection fraction)
Shortness of breath

## 2025-03-27 NOTE — DISCHARGE NOTE NURSING/CASE MANAGEMENT/SOCIAL WORK - NSDCFUADDAPPT_GEN_ALL_CORE_FT
APPTS ARE READY TO BE MADE: [X] YES    Best Family or Patient Contact (if needed):    Additional Information about above appointments (if needed):    1: Cards  2: Endo  3: Pulm  4: Nephro  5: Vasc Surg  6: EP  7: Uro  8: Podiatry  9: PCP    Other comments or requests:       Podiatry Discharge Instructions:  Follow up: Please follow up with Dr. Rosen within 1 week of discharge from the hospital, please call [phone] for appointment and discuss that you recently were seen in the hospital.  Wound Care: Please apply adaptic and aquacel ag to bilateral lower extremity wounds followed by 4x4 gauze, ABD pad, and maru and ace bandage daily   Weight bearing: Please weight bear as tolerated in a surgical shoe.  Antibiotics: Please continue as instructed.      Patient is being discharged to rehab. Caregiver will arrange follow up

## 2025-03-27 NOTE — CHART NOTE - NSCHARTNOTESELECT_GEN_ALL_CORE
CARDIAC REHAB/Event Note
Chronic Pain/Event Note
Endocrine followup/Event Note
Endocrine followup/Event Note
Event Note
Event Note
Nutrition Services
Nutrition Services
PACU
Post-Procedure Note
Vascular Surgery Planning
endocrinology/Event Note
overnight event/Event Note
Discharge
Endocrine followup/Event Note
Endocrinology/Event Note
Event Note
Interventional Radiology
Nutrition Services
Post-Procedure Note
Vascular

## 2025-03-27 NOTE — PROGRESS NOTE ADULT - PROBLEM SELECTOR PROBLEM 5
Occlusion of popliteal artery

## 2025-03-27 NOTE — PROGRESS NOTE ADULT - PROBLEM SELECTOR PROBLEM 2
CAD (coronary artery disease)
Congestive heart failure (CHF)
Hyperlipidemia
Hyperlipidemia
Hypertension
Hypertension
Congestive heart failure (CHF)
Hypertension
Hyperlipidemia
Hypertension
CAD (coronary artery disease)
Congestive heart failure (CHF)
Hypertension
Congestive heart failure (CHF)
Congestive heart failure (CHF)
Hyperlipidemia
Congestive heart failure (CHF)
Congestive heart failure (CHF)
CAD (coronary artery disease)
Congestive heart failure (CHF)
Hypertension
CAD (coronary artery disease)
CAD (coronary artery disease)
Congestive heart failure (CHF)
Congestive heart failure (CHF)
CAD (coronary artery disease)
Hyperlipidemia
CAD (coronary artery disease)
Hyperlipidemia
Hyperlipidemia
Congestive heart failure (CHF)
Hyperlipidemia
Congestive heart failure (CHF)
Hyperlipidemia
Hypertension
Hypertension
Congestive heart failure (CHF)
CAD (coronary artery disease)
Congestive heart failure (CHF)
CAD (coronary artery disease)
Congestive heart failure (CHF)
Congestive heart failure (CHF)

## 2025-03-27 NOTE — PROGRESS NOTE ADULT - PROBLEM SELECTOR PROBLEM 4
CAD (coronary artery disease)
JAMEL (acute kidney injury)
CAD (coronary artery disease)
CAD (coronary artery disease)
JAMEL (acute kidney injury)
JAMEL (acute kidney injury)
CAD (coronary artery disease)
JAMEL (acute kidney injury)
JAMEL (acute kidney injury)
CAD (coronary artery disease)
JAMEL (acute kidney injury)
JAMEL (acute kidney injury)
CAD (coronary artery disease)
CAD (coronary artery disease)
JAMEL (acute kidney injury)
CAD (coronary artery disease)
JAMEL (acute kidney injury)
CAD (coronary artery disease)
JAMEL (acute kidney injury)
CAD (coronary artery disease)

## 2025-03-27 NOTE — PROGRESS NOTE ADULT - SUBJECTIVE AND OBJECTIVE BOX
Follow-up Pulm Progress Note    No new respiratory events overnight.  Denies SOB/CP.     Medications:  MEDICATIONS  (STANDING):  acetaminophen     Tablet .. 975 milliGRAM(s) Oral every 8 hours  aspirin enteric coated 81 milliGRAM(s) Oral daily  atorvastatin 40 milliGRAM(s) Oral at bedtime  bisacodyl 5 milliGRAM(s) Oral every 12 hours  cefTRIAXone   IVPB 1000 milliGRAM(s) IV Intermittent every 24 hours  chlorhexidine 4% Liquid 1 Application(s) Topical <User Schedule>  clopidogrel Tablet 75 milliGRAM(s) Oral daily  dextrose 5%. 1000 milliLiter(s) (100 mL/Hr) IV Continuous <Continuous>  dextrose 5%. 1000 milliLiter(s) (50 mL/Hr) IV Continuous <Continuous>  dextrose 50% Injectable 25 Gram(s) IV Push once  dextrose 50% Injectable 12.5 Gram(s) IV Push once  dextrose 50% Injectable 25 Gram(s) IV Push once  gabapentin 300 milliGRAM(s) Oral every 8 hours  glucagon  Injectable 1 milliGRAM(s) IntraMuscular once  heparin   Injectable 5000 Unit(s) SubCutaneous every 12 hours  insulin glargine Injectable (LANTUS) 16 Unit(s) SubCutaneous at bedtime  insulin lispro (ADMELOG) corrective regimen sliding scale   SubCutaneous three times a day before meals  insulin lispro (ADMELOG) corrective regimen sliding scale   SubCutaneous at bedtime  insulin lispro Injectable (ADMELOG) 9 Unit(s) SubCutaneous three times a day with meals  methocarbamol 750 milliGRAM(s) Oral every 6 hours  metoprolol succinate ER 25 milliGRAM(s) Oral daily  Nephro-rober 1 Tablet(s) Oral daily  pantoprazole  Injectable 40 milliGRAM(s) IV Push daily  polyethylene glycol 3350 17 Gram(s) Oral daily  sacubitril 24 mG/valsartan 26 mG 1 Tablet(s) Oral two times a day  senna 2 Tablet(s) Oral at bedtime  sodium zirconium cyclosilicate 10 Gram(s) Oral daily    MEDICATIONS  (PRN):  dextrose Oral Gel 15 Gram(s) Oral once PRN Blood Glucose LESS THAN 70 milliGRAM(s)/deciliter  melatonin 3 milliGRAM(s) Oral at bedtime PRN Insomnia  ondansetron Injectable 4 milliGRAM(s) IV Push every 6 hours PRN Nausea and/or Vomiting  sodium chloride 0.65% Nasal 1 Spray(s) Both Nostrils three times a day PRN Dryness  sodium chloride 0.9% lock flush 10 milliLiter(s) IV Push every 1 hour PRN Pre/post blood products, medications, blood draw, and to maintain line patency          Vital Signs Last 24 Hrs  T(C): 36.7 (27 Mar 2025 04:25), Max: 36.8 (26 Mar 2025 17:05)  T(F): 98.1 (27 Mar 2025 04:25), Max: 98.3 (26 Mar 2025 20:46)  HR: 88 (27 Mar 2025 04:25) (88 - 103)  BP: 94/57 (27 Mar 2025 04:25) (87/50 - 94/58)  BP(mean): --  RR: 18 (27 Mar 2025 04:25) (18 - 18)  SpO2: 94% (27 Mar 2025 04:25) (91% - 96%)    Parameters below as of 27 Mar 2025 04:25  Patient On (Oxygen Delivery Method): room air              03-26 @ 07:01  -  03-27 @ 07:00  --------------------------------------------------------  IN: 0 mL / OUT: 1650 mL / NET: -1650 mL          LABS:                        9.1    10.25 )-----------( 283      ( 27 Mar 2025 05:48 )             26.2     03-27    133[L]  |  97  |  38[H]  ----------------------------<  93  4.6   |  22  |  1.27    Ca    8.2[L]      27 Mar 2025 05:49  Mg     1.8     03-27            CAPILLARY BLOOD GLUCOSE      POCT Blood Glucose.: 98 mg/dL (27 Mar 2025 08:12)      Urinalysis Basic - ( 27 Mar 2025 05:49 )    Color: x / Appearance: x / SG: x / pH: x  Gluc: 93 mg/dL / Ketone: x  / Bili: x / Urobili: x   Blood: x / Protein: x / Nitrite: x   Leuk Esterase: x / RBC: x / WBC x   Sq Epi: x / Non Sq Epi: x / Bacteria: x      Procalcitonin: 0.10 ng/mL (03-26-25 @ 11:28)            CULTURES:       Culture - Urine (collected 03-24-25 @ 07:13)  Source: Catheterized Catheterized  Final Report (03-27-25 @ 07:52):    >100,000 CFU/ml Klebsiella pneumoniae ESBL    >100,000 CFU/ml Escherichia coli  Organism: Klebsiella pneumoniae ESBL  Escherichia coli (03-27-25 @ 07:52)  Organism: Escherichia coli (03-27-25 @ 07:52)      Method Type: RUSSELL      -  Amoxicillin/Clavulanic Acid: S <=8/4      -  Ampicillin: S <=8 These ampicillin results predict results for amoxicillin      -  Ampicillin/Sulbactam: S <=4/2      -  Aztreonam: S <=4      -  Cefazolin: S <=2 For uncomplicated UTI with K. pneumoniae, E. coli, or P. mirablis: RUSSELL <=16 is sensitive and RUSSELL >=32 is resistant. This also predicts results for oral agents cefaclor, cefdinir, cefpodoxime, cefprozil, cefuroxime axetil, cephalexin and locarbef for uncomplicated UTI. Note that some isolates may be susceptible to these agents while testing resistant to cefazolin.      -  Cefepime: S <=2      -  Cefoxitin: S <=8      -  Ceftriaxone: S <=1      -  Cefuroxime: S <=4      -  Ciprofloxacin: S <=0.25      -  Ertapenem: S <=0.5      -  Gentamicin: S <=2      -  Imipenem: S <=1      -  Levofloxacin: S <=0.5      -  Meropenem: S <=1      -  Nitrofurantoin: S <=32 Should not be used to treat pyelonephritis      -  Piperacillin/Tazobactam: S <=8      -  Tobramycin: S <=2      -  Trimethoprim/Sulfamethoxazole: S <=0.5/9.5  Organism: Klebsiella pneumoniae ESBL (03-27-25 @ 07:52)      Method Type: RUSSELL      -  Ampicillin: R >16 These ampicillin results predict results for amoxicillin      -  Ampicillin/Sulbactam: R >16/8      -  Aztreonam: R >16      -  Cefazolin: R >16 For uncomplicated UTI with K. pneumoniae, E. coli, or P. mirablis: RUSSELL <=16 is sensitive and RUSSELL >=32 is resistant. This also predicts results for oral agents cefaclor, cefdinir, cefpodoxime, cefprozil, cefuroxime axetil, cephalexin and locarbef for uncomplicated UTI. Note that some isolates may be susceptible to these agents while testing resistant to cefazolin.      -  Cefepime: S <=2      -  Cefoxitin: R >16      -  Ceftriaxone: R 8      -  Cefuroxime: R >16      -  Ciprofloxacin: R 1      -  Ertapenem: S <=0.5      -  Gentamicin: S <=2      -  Imipenem: S <=1      -  Levofloxacin: S <=0.5      -  Meropenem: S <=1      -  Nitrofurantoin: S <=32 Should not be used to treat pyelonephritis      -  Piperacillin/Tazobactam: S <=8      -  Tobramycin: S <=2      -  Trimethoprim/Sulfamethoxazole: S <=0.5/9.5                Physical Examination:  PULM: grossly clear  CVS: S1, S2 heard    RADIOLOGY REVIEWED  CXR 3/26 with mild congestion, small L pl effusion (f/u official report)

## 2025-03-27 NOTE — PROGRESS NOTE ADULT - NS ATTEND AMEND GEN_ALL_CORE FT
Patient seen and examined by me. patient care and plan discussed and reviewed with PA. Plan as outlined above edited by me to reflect our discussion.
Patient seen and examined by me. patient care and plan discussed and reviewed with PA. Plan as outlined above edited by me to reflect our discussion.
Patient seen and examined by me. patient care and plan discussed and reviewed with PA. Plan as outlined above edited by me to reflect our discussion.    Discussed with patient and brother at the bedside
Patient seen and examined by me. patient care and plan discussed and reviewed with PA. Plan as outlined above edited by me to reflect our discussion.
Patient seen and examined by me. patient care and plan discussed and reviewed with PA. Plan as outlined above edited by me to reflect our discussion. Advanced care planning/advanced directives discussed with patient/family. DNR status including forceful chest compressions to attempt to restart the heart, ventilator support/artificial breathing, electric shock, artificial nutrition, health care proxy, Molst form all discussed with pt. More than 50% of the visit was spent counseling and/or coordinating care by the attending physician. Sixteen minutes spent on discussing advanced directives.
Patient seen and examined by me. patient care and plan discussed and reviewed with PA. Plan as outlined above edited by me to reflect our discussion. Advanced care planning/advanced directives discussed with patient/family. DNR status including forceful chest compressions to attempt to restart the heart, ventilator support/artificial breathing, electric shock, artificial nutrition, health care proxy, Molst form all discussed with pt. Sixty seven minutes of total time dedicated to this patient visit today including preparing to see the patient (eg. review of tests), obtaining and/or reviewing separately obtained history, obtaining/reviewing vitals, performing a medically appropriate examination and/or evaluation, counseling and educating the patient/family/caregiver, reviewing previous notes and test results, and procedures, communicating with other health professionals (when not separately reported), and documenting clinical information in the electronic health record.
Patient seen and examined by me. patient care and plan discussed and reviewed with PA. Plan as outlined above edited by me to reflect our discussion. Advanced care planning/advanced directives discussed with patient/family. DNR status including forceful chest compressions to attempt to restart the heart, ventilator support/artificial breathing, electric shock, artificial nutrition, health care proxy, Molst form all discussed with pt. Sixty seven minutes of total time dedicated to this patient visit today including preparing to see the patient (eg. review of tests), obtaining and/or reviewing separately obtained history, obtaining/reviewing vitals, performing a medically appropriate examination and/or evaluation, counseling and educating the patient/family/caregiver, reviewing previous notes and test results, and procedures, communicating with other health professionals (when not separately reported), and documenting clinical information in the electronic health record.
Patient seen and examined by me. patient care and plan discussed and reviewed with PA. Sixty seven minutes of total time dedicated to this patient visit today including preparing to see the patient (eg. review of tests), obtaining and/or reviewing separately obtained history, obtaining/reviewing vitals, performing a medically appropriate examination and/or evaluation, counseling and educating the patient/family/caregiver, reviewing previous notes and test results, and procedures, communicating with other health professionals (when not separately reported), and documenting clinical information in the electronic health record.
64F HFrEF (previously 30-35%, now 20%), DM, diabetic foot ulcer, severe PAD b/l, presenting initially for vascular angiogram, found to be in ADHF with volume overload. Trialed on HD. Getting IV Bumex qd.    Remains volume OL.    RHC 2/13: RA 20, PA 49/29 (40), PCWP 35, mVO2 51.1, CO/CI 3.8/2.2, SVR 1095     Now s/p PCI/stent of LM and LAD and POBA of Cx.    Cardiac testing:   cMRI 2/19/25 : LVEF is 25%, greater than 75% subendocardial scarring involving the basal to mid inferior wall of the left ventricle, left ventricular transmural scarring involving the apical septal wall and likely near transmural scarring of the apical lateral wall. 50% scarring of the left ventricular basal inferoseptal wall.   TTE 2/10/25: EF 20%, reduced RVSF   TTE 1/16/25: EF 30-35%, grade 2 DD   NST 1/24/25: small moderate defect in apical wall that is predominantly fixed    -Continue milrinone 0.25 for now. Will attempt tpo wean in next couple of days.  -Start hydralazine 10 mg po tid and Isordil 5 mg po tid.  -Will defer diuretic drip for now, given dye load.  -Clarify long-term HD plan with [facility].
Patient seen and examined by me. patient care and plan discussed and reviewed with PA. Plan as outlined above edited by me to reflect our discussion.
Patient seen and examined by me. patient care and plan discussed and reviewed with PA. Plan as outlined above edited by me to reflect our discussion. Advanced care planning/advanced directives discussed with patient/family. DNR status including forceful chest compressions to attempt to restart the heart, ventilator support/artificial breathing, electric shock, artificial nutrition, health care proxy, Molst form all discussed with pt. More than 50% of the visit was spent counseling and/or coordinating care by the attending physician.
Patient seen and examined by me. patient care and plan discussed and reviewed with PA. Plan as outlined above edited by me to reflect our discussion. Advanced care planning/advanced directives discussed with patient/family. DNR status including forceful chest compressions to attempt to restart the heart, ventilator support/artificial breathing, electric shock, artificial nutrition, health care proxy, Molst form all discussed with pt. More than 50% of the visit was spent counseling and/or coordinating care by the attending physician. Sixteen minutes spent on discussing advanced directives.
Patient seen and examined by me. patient care and plan discussed and reviewed with PA. Plan as outlined above edited by me to reflect our discussion. Advanced care planning/advanced directives discussed with patient/family. DNR status including forceful chest compressions to attempt to restart the heart, ventilator support/artificial breathing, electric shock, artificial nutrition, health care proxy, Molst form all discussed with pt. More than 50% of the visit was spent counseling and/or coordinating care by the attending physician. Sixteen minutes spent on discussing advanced directives.
Patient seen and examined by me. patient care and plan discussed and reviewed with PA. Plan as outlined above edited by me to reflect our discussion. Advanced care planning/advanced directives discussed with patient/family. DNR status including forceful chest compressions to attempt to restart the heart, ventilator support/artificial breathing, electric shock, artificial nutrition, health care proxy, Molst form all discussed with pt. Sixty seven minutes of total time dedicated to this patient visit today including preparing to see the patient (eg. review of tests), obtaining and/or reviewing separately obtained history, obtaining/reviewing vitals, performing a medically appropriate examination and/or evaluation, counseling and educating the patient/family/caregiver, reviewing previous notes and test results, and procedures, communicating with other health professionals (when not separately reported), and documenting clinical information in the electronic health record.
Patient seen and examined by me. patient care and plan discussed and reviewed with PA. Plan as outlined above edited by me to reflect our discussion. Advanced care planning/advanced directives discussed with patient/family. DNR status including forceful chest compressions to attempt to restart the heart, ventilator support/artificial breathing, electric shock, artificial nutrition, health care proxy, Molst form all discussed with pt. Sixty seven minutes of total time dedicated to this patient visit today including preparing to see the patient (eg. review of tests), obtaining and/or reviewing separately obtained history, obtaining/reviewing vitals, performing a medically appropriate examination and/or evaluation, counseling and educating the patient/family/caregiver, reviewing previous notes and test results, and procedures, communicating with other health professionals (when not separately reported), and documenting clinical information in the electronic health record.
Patient seen and examined by me. patient care and plan discussed and reviewed with PA. Plan as outlined above edited by me to reflect our discussion.
Patient seen and examined by me. patient care and plan discussed and reviewed with PA. Plan as outlined above edited by me to reflect our discussion. Advanced care planning/advanced directives discussed with patient/family. DNR status including forceful chest compressions to attempt to restart the heart, ventilator support/artificial breathing, electric shock, artificial nutrition, health care proxy, Molst form all discussed with pt. More than 50% of the visit was spent counseling and/or coordinating care by the attending physician. Sixteen minutes spent on discussing advanced directives.
Patient seen and examined by me. patient care and plan discussed and reviewed with PA. Plan as outlined above edited by me to reflect our discussion. Advanced care planning/advanced directives discussed with patient/family. DNR status including forceful chest compressions to attempt to restart the heart, ventilator support/artificial breathing, electric shock, artificial nutrition, health care proxy, Molst form all discussed with pt. Sixty seven minutes of total time dedicated to this patient visit today including preparing to see the patient (eg. review of tests), obtaining and/or reviewing separately obtained history, obtaining/reviewing vitals, performing a medically appropriate examination and/or evaluation, counseling and educating the patient/family/caregiver, reviewing previous notes and test results, and procedures, communicating with other health professionals (when not separately reported), and documenting clinical information in the electronic health record.
Patient seen and examined by me. patient care and plan discussed and reviewed with PA. Plan as outlined above edited by me to reflect our discussion. Sixty seven minutes of total time dedicated to this patient visit today including preparing to see the patient (eg. review of tests), obtaining and/or reviewing separately obtained history, obtaining/reviewing vitals, performing a medically appropriate examination and/or evaluation, counseling and educating the patient/family/caregiver, reviewing previous notes and test results, and procedures, communicating with other health professionals (when not separately reported), and documenting clinical information in the electronic health record.
Patient seen and examined by me. patient care and plan discussed and reviewed with PA. Plan as outlined above edited by me to reflect our discussion.
Patient seen and examined by me. patient care and plan discussed and reviewed with PA. Plan as outlined above edited by me to reflect our discussion.     discussed with vascular team
Patient seen and examined by me. patient care and plan discussed and reviewed with PA. Plan as outlined above edited by me to reflect our discussion. Advanced care planning/advanced directives discussed with patient/family. DNR status including forceful chest compressions to attempt to restart the heart, ventilator support/artificial breathing, electric shock, artificial nutrition, health care proxy, Molst form all discussed with pt. Sixty seven minutes of total time dedicated to this patient visit today including preparing to see the patient (eg. review of tests), obtaining and/or reviewing separately obtained history, obtaining/reviewing vitals, performing a medically appropriate examination and/or evaluation, counseling and educating the patient/family/caregiver, reviewing previous notes and test results, and procedures, communicating with other health professionals (when not separately reported), and documenting clinical information in the electronic health record.
Ms. Kirby is a 65 y/o F h/o HFrEF/ICM (prev EF 30-35%, now 20%, LVEDD 4.9 cm) w/o ICD, poorly controlled DM (A1c 11.6 ) c/b diabetic foot ulcer, severe PAD b/l, presenting on  initially for vascular angiogram, found to be in ADHF. Empirically placed on /bumex gtt and HF consulted on . Underwent RHC : RA 20, PA 49/29 (40), PCWP 35, mVO2 51.1, CO/CI 3.8/2.2, SVR 1095. Found to have 3vCAD (LM 70%, mLAD 90%, ostial LCx 80%, pRCA 100% w/ collaterals from L. Declined for CABG and underwent PCI on 3/4 with VERA to LM/LAD and POBA to LCx. Was on primacor which was weaned off 3/9, transitioned to neurohormonal agents. Notably, developed JAMEL during hospitalization requiring dialysis (reports anuric). Underwent peripheral angioplasty on . On exam, NAD, JVP elevated, RRR, no m/r/g, dec BS b/l, nontender abdomen, b/l bandages. Labs reviewed - BUN/Cr 43/4.4, K 4.7. TTE reviewed - EF 15-20%, LVEDD 4.9 cm, mild RV dysfunction, mod MR, mild TR, pleural effusion. Overall stage C HF, NYHA class III with multiple comorbities and poor candidate for advanced therapies.   - recommend daily HD/UF  - start hydral 10 mg q8h (post-HD); hold for SBP <90  - switch lopressor to toprol 25 mg daily  - not on ACEi/ARB/MRA d/t ESRD  - aggressive PT  - c/w antiplatelet, statin  - emphasized good glycemic control  - prognosis guarded; will sign off but please call w/ any questions
keep o>I as tolerated  keep sat>90% w o2 as needed  repeat cxr  dw pt
o>I as tolerated  keep sat>90%
pt on ra wo sob  cta chest no central pe, rt>lt effusions and atelectasis with congestion  o>I as tolerated  fu official cta report  keep sat>90% w o2 as needed

## 2025-04-01 ENCOUNTER — APPOINTMENT (OUTPATIENT)
Dept: VASCULAR SURGERY | Facility: CLINIC | Age: 65
End: 2025-04-01

## 2025-04-16 ENCOUNTER — APPOINTMENT (OUTPATIENT)
Dept: UROLOGY | Facility: CLINIC | Age: 65
End: 2025-04-16
Payer: COMMERCIAL

## 2025-04-16 VITALS
HEART RATE: 93 BPM | TEMPERATURE: 98.2 F | DIASTOLIC BLOOD PRESSURE: 69 MMHG | WEIGHT: 147 LBS | HEIGHT: 65 IN | OXYGEN SATURATION: 97 % | BODY MASS INDEX: 24.49 KG/M2 | SYSTOLIC BLOOD PRESSURE: 110 MMHG

## 2025-04-16 DIAGNOSIS — Z86.79 PERSONAL HISTORY OF OTHER DISEASES OF THE CIRCULATORY SYSTEM: ICD-10-CM

## 2025-04-16 DIAGNOSIS — Z78.9 OTHER SPECIFIED HEALTH STATUS: ICD-10-CM

## 2025-04-16 DIAGNOSIS — Z86.39 PERSONAL HISTORY OF OTHER ENDOCRINE, NUTRITIONAL AND METABOLIC DISEASE: ICD-10-CM

## 2025-04-16 DIAGNOSIS — Z87.19 PERSONAL HISTORY OF OTHER DISEASES OF THE DIGESTIVE SYSTEM: ICD-10-CM

## 2025-04-16 DIAGNOSIS — R33.9 RETENTION OF URINE, UNSPECIFIED: ICD-10-CM

## 2025-04-16 DIAGNOSIS — Z86.2 PERSONAL HISTORY OF DISEASES OF THE BLOOD AND BLOOD-FORMING ORGANS AND CERTAIN DISORDERS INVOLVING THE IMMUNE MECHANISM: ICD-10-CM

## 2025-04-16 DIAGNOSIS — E78.00 PURE HYPERCHOLESTEROLEMIA, UNSPECIFIED: ICD-10-CM

## 2025-04-16 PROCEDURE — 99203 OFFICE O/P NEW LOW 30 MIN: CPT | Mod: 25

## 2025-04-16 PROCEDURE — 51701 INSERT BLADDER CATHETER: CPT

## 2025-04-17 ENCOUNTER — APPOINTMENT (OUTPATIENT)
Dept: UROLOGY | Facility: CLINIC | Age: 65
End: 2025-04-17

## 2025-04-17 PROBLEM — R33.9 URINARY RETENTION: Status: ACTIVE | Noted: 2025-04-17

## 2025-04-21 RX ORDER — GABAPENTIN 300 MG
300 TABLET ORAL
Refills: 0 | Status: ACTIVE | COMMUNITY

## 2025-04-21 RX ORDER — CHLORHEXIDINE GLUCONATE 4 %
5 LIQUID (ML) TOPICAL
Refills: 0 | Status: ACTIVE | COMMUNITY

## 2025-04-21 RX ORDER — OXYCODONE 5 MG/1
5 TABLET ORAL
Refills: 0 | Status: ACTIVE | COMMUNITY

## 2025-04-21 RX ORDER — INSULIN LISPRO 100 [IU]/ML
100 INJECTION, SOLUTION INTRAVENOUS; SUBCUTANEOUS
Refills: 0 | Status: ACTIVE | COMMUNITY

## 2025-04-21 RX ORDER — METOPROLOL SUCCINATE 25 MG/1
25 TABLET, EXTENDED RELEASE ORAL
Refills: 0 | Status: ACTIVE | COMMUNITY

## 2025-04-21 RX ORDER — SACUBITRIL AND VALSARTAN 24; 26 MG/1; MG/1
24-26 TABLET, FILM COATED ORAL
Refills: 0 | Status: ACTIVE | COMMUNITY

## 2025-04-21 RX ORDER — INSULIN GLARGINE 100 [IU]/ML
100 INJECTION, SOLUTION SUBCUTANEOUS
Refills: 0 | Status: ACTIVE | COMMUNITY

## 2025-04-21 RX ORDER — TORSEMIDE 10 MG/1
10 TABLET ORAL
Refills: 0 | Status: ACTIVE | COMMUNITY

## 2025-04-21 RX ORDER — METHOCARBAMOL 750 MG/1
750 TABLET, FILM COATED ORAL
Refills: 0 | Status: ACTIVE | COMMUNITY

## 2025-04-21 RX ORDER — CLOPIDOGREL BISULFATE 75 MG/1
75 TABLET, FILM COATED ORAL
Refills: 0 | Status: ACTIVE | COMMUNITY

## 2025-04-21 RX ORDER — ASPIRIN 81 MG/1
81 TABLET ORAL
Refills: 0 | Status: ACTIVE | COMMUNITY

## 2025-04-21 RX ORDER — EMPAGLIFLOZIN 10 MG/1
10 TABLET, FILM COATED ORAL
Refills: 0 | Status: ACTIVE | COMMUNITY

## 2025-04-21 RX ORDER — PANTOPRAZOLE 40 MG/1
40 TABLET, DELAYED RELEASE ORAL
Refills: 0 | Status: ACTIVE | COMMUNITY

## 2025-04-21 RX ORDER — ATORVASTATIN CALCIUM 40 MG/1
40 TABLET, FILM COATED ORAL
Refills: 0 | Status: ACTIVE | COMMUNITY

## 2025-04-21 RX ORDER — SPIRONOLACTONE 25 MG/1
25 TABLET, FILM COATED ORAL
Refills: 0 | Status: ACTIVE | COMMUNITY

## 2025-04-22 LAB — BACTERIA UR CULT: ABNORMAL

## 2025-06-09 NOTE — DISCHARGE NOTE PROVIDER - NS AS DC PROVIDER CONTACT Y/N MULTI
Requested Renewals     levothyroxine 75 MCG Oral Tab         Sig: Take 1 tablet (75 mcg total) by mouth before breakfast.    Disp: 30 tablet    Refills: 0    Start: 6/9/2025    Class: Normal    For: Hypothyroidism, unspecified type    Last ordered: 1 month ago (4/12/2025) by DAISY Brennan    Thyroid Medication Protocol Upsqqu1006/09/2025 10:40 AM   Protocol Details TSH in past 12 months    Last TSH value is normal    In person appointment or virtual visit in the past 12 mos or appointment in next 3 mos    Medication is active on med list      To be filled at: [id number] - [place], [place] - [address] AT [facility] & [facility] ([address]), [phone], [phone]        
Patient requesting refill    levothyroxine 75 MCG Oral Tab     Manuel Gustafson      
Yes

## 2025-06-11 ENCOUNTER — APPOINTMENT (OUTPATIENT)
Dept: ELECTROPHYSIOLOGY | Facility: CLINIC | Age: 65
End: 2025-06-11

## 2025-07-10 NOTE — PROGRESS NOTE ADULT - PROBLEM SELECTOR PROBLEM 3
Labs drawn peripherally without difficulty. Coban dressing applied. Patient tolerated well.    Abscess of right breast

## (undated) DEVICE — GLV 7.5 PROTEXIS (WHITE)

## (undated) DEVICE — SUT SOFSILK 2-0 30" V-20

## (undated) DEVICE — DRAPE INSTRUMENT POUCH 6.75" X 11"

## (undated) DEVICE — SUT SOFSILK 3-0 30" V-20

## (undated) DEVICE — BLADE SCALPEL SAFETYLOCK #15

## (undated) DEVICE — SUT POLYSORB 4-0 18" TIES UNDYED

## (undated) DEVICE — SUT POLYSORB 2-0 30" GS-21 UNDYED

## (undated) DEVICE — SOL IRR POUR NS 0.9% 500ML

## (undated) DEVICE — SUT MONOSOF 2-0 18" C-15

## (undated) DEVICE — SUCTION YANKAUER NO CONTROL VENT